# Patient Record
Sex: MALE | Race: BLACK OR AFRICAN AMERICAN | Employment: OTHER | URBAN - METROPOLITAN AREA
[De-identification: names, ages, dates, MRNs, and addresses within clinical notes are randomized per-mention and may not be internally consistent; named-entity substitution may affect disease eponyms.]

---

## 2017-01-01 ENCOUNTER — ALLIED HEALTH/NURSE VISIT (OUTPATIENT)
Dept: PHARMACY | Facility: CLINIC | Age: 67
End: 2017-01-01
Payer: COMMERCIAL

## 2017-01-01 ENCOUNTER — TELEPHONE (OUTPATIENT)
Dept: FAMILY MEDICINE | Facility: CLINIC | Age: 67
End: 2017-01-01

## 2017-01-01 ENCOUNTER — ANTICOAGULATION THERAPY VISIT (OUTPATIENT)
Dept: ANTICOAGULATION | Facility: CLINIC | Age: 67
End: 2017-01-01

## 2017-01-01 ENCOUNTER — APPOINTMENT (OUTPATIENT)
Dept: CARDIOLOGY | Facility: CLINIC | Age: 67
DRG: 315 | End: 2017-01-01
Payer: COMMERCIAL

## 2017-01-01 ENCOUNTER — CARE COORDINATION (OUTPATIENT)
Dept: GERIATRIC MEDICINE | Facility: CLINIC | Age: 67
End: 2017-01-01

## 2017-01-01 ENCOUNTER — OFFICE VISIT (OUTPATIENT)
Dept: FAMILY MEDICINE | Facility: CLINIC | Age: 67
End: 2017-01-01
Payer: COMMERCIAL

## 2017-01-01 ENCOUNTER — CARE COORDINATION (OUTPATIENT)
Dept: CARE COORDINATION | Facility: CLINIC | Age: 67
End: 2017-01-01

## 2017-01-01 ENCOUNTER — HOSPITAL ENCOUNTER (OUTPATIENT)
Facility: CLINIC | Age: 67
Setting detail: SPECIMEN
Discharge: HOME | End: 2017-12-09
Admitting: UROLOGY
Payer: COMMERCIAL

## 2017-01-01 ENCOUNTER — OFFICE VISIT (OUTPATIENT)
Dept: INTERPRETER SERVICES | Facility: CLINIC | Age: 67
End: 2017-01-01

## 2017-01-01 ENCOUNTER — APPOINTMENT (OUTPATIENT)
Dept: CT IMAGING | Facility: CLINIC | Age: 67
DRG: 315 | End: 2017-01-01
Attending: EMERGENCY MEDICINE
Payer: COMMERCIAL

## 2017-01-01 ENCOUNTER — OFFICE VISIT (OUTPATIENT)
Dept: CARDIOLOGY | Facility: CLINIC | Age: 67
End: 2017-01-01
Attending: NURSE PRACTITIONER
Payer: COMMERCIAL

## 2017-01-01 ENCOUNTER — CARE COORDINATION (OUTPATIENT)
Dept: CARDIOLOGY | Facility: CLINIC | Age: 67
End: 2017-01-01

## 2017-01-01 ENCOUNTER — TRANSFERRED RECORDS (OUTPATIENT)
Dept: HEALTH INFORMATION MANAGEMENT | Facility: CLINIC | Age: 67
End: 2017-01-01

## 2017-01-01 ENCOUNTER — HOSPITAL ENCOUNTER (INPATIENT)
Facility: CLINIC | Age: 67
LOS: 4 days | Discharge: HOME | DRG: 315 | End: 2017-11-26
Attending: EMERGENCY MEDICINE | Admitting: INTERNAL MEDICINE
Payer: COMMERCIAL

## 2017-01-01 ENCOUNTER — HOSPITAL ENCOUNTER (OUTPATIENT)
Facility: CLINIC | Age: 67
Setting detail: SPECIMEN
Discharge: HOME | End: 2017-09-15
Payer: COMMERCIAL

## 2017-01-01 ENCOUNTER — DOCUMENTATION ONLY (OUTPATIENT)
Dept: CARE COORDINATION | Facility: CLINIC | Age: 67
End: 2017-01-01

## 2017-01-01 ENCOUNTER — OFFICE VISIT (OUTPATIENT)
Dept: NEPHROLOGY | Facility: CLINIC | Age: 67
End: 2017-01-01
Payer: COMMERCIAL

## 2017-01-01 ENCOUNTER — NURSE TRIAGE (OUTPATIENT)
Dept: NURSING | Facility: CLINIC | Age: 67
End: 2017-01-01

## 2017-01-01 ENCOUNTER — HOSPITAL ENCOUNTER (INPATIENT)
Facility: CLINIC | Age: 67
LOS: 2 days | Discharge: HOME | DRG: 315 | End: 2017-08-29
Attending: EMERGENCY MEDICINE | Admitting: INTERNAL MEDICINE
Payer: COMMERCIAL

## 2017-01-01 ENCOUNTER — TELEPHONE (OUTPATIENT)
Dept: PHARMACY | Facility: OTHER | Age: 67
End: 2017-01-01

## 2017-01-01 ENCOUNTER — RADIANT APPOINTMENT (OUTPATIENT)
Dept: CARDIOLOGY | Facility: CLINIC | Age: 67
End: 2017-01-01
Attending: NURSE PRACTITIONER

## 2017-01-01 ENCOUNTER — HOSPITAL ENCOUNTER (OUTPATIENT)
Facility: CLINIC | Age: 67
Setting detail: SPECIMEN
Discharge: HOME | End: 2017-12-12
Admitting: INTERNAL MEDICINE
Payer: COMMERCIAL

## 2017-01-01 ENCOUNTER — HOSPITAL ENCOUNTER (EMERGENCY)
Facility: CLINIC | Age: 67
Discharge: HOME | End: 2017-07-20
Attending: EMERGENCY MEDICINE | Admitting: EMERGENCY MEDICINE
Payer: COMMERCIAL

## 2017-01-01 ENCOUNTER — CARE COORDINATION (OUTPATIENT)
Dept: FAMILY MEDICINE | Facility: CLINIC | Age: 67
End: 2017-01-01

## 2017-01-01 ENCOUNTER — APPOINTMENT (OUTPATIENT)
Dept: GENERAL RADIOLOGY | Facility: CLINIC | Age: 67
End: 2017-01-01
Attending: EMERGENCY MEDICINE
Payer: COMMERCIAL

## 2017-01-01 ENCOUNTER — TELEPHONE (OUTPATIENT)
Dept: NURSING | Facility: CLINIC | Age: 67
End: 2017-01-01

## 2017-01-01 VITALS
OXYGEN SATURATION: 100 % | BODY MASS INDEX: 27.35 KG/M2 | SYSTOLIC BLOOD PRESSURE: 99 MMHG | HEART RATE: 60 BPM | TEMPERATURE: 98.2 F | RESPIRATION RATE: 18 BRPM | DIASTOLIC BLOOD PRESSURE: 83 MMHG | WEIGHT: 179.9 LBS

## 2017-01-01 VITALS
TEMPERATURE: 97.9 F | WEIGHT: 185.63 LBS | DIASTOLIC BLOOD PRESSURE: 85 MMHG | SYSTOLIC BLOOD PRESSURE: 98 MMHG | HEART RATE: 66 BPM | OXYGEN SATURATION: 96 % | BODY MASS INDEX: 28.22 KG/M2 | RESPIRATION RATE: 16 BRPM

## 2017-01-01 VITALS
OXYGEN SATURATION: 99 % | SYSTOLIC BLOOD PRESSURE: 92 MMHG | DIASTOLIC BLOOD PRESSURE: 64 MMHG | RESPIRATION RATE: 14 BRPM | HEART RATE: 60 BPM

## 2017-01-01 VITALS
WEIGHT: 170 LBS | SYSTOLIC BLOOD PRESSURE: 72 MMHG | HEIGHT: 68 IN | HEART RATE: 64 BPM | TEMPERATURE: 98.1 F | OXYGEN SATURATION: 98 % | BODY MASS INDEX: 25.76 KG/M2

## 2017-01-01 VITALS
OXYGEN SATURATION: 91 % | SYSTOLIC BLOOD PRESSURE: 78 MMHG | TEMPERATURE: 97.7 F | HEART RATE: 60 BPM | BODY MASS INDEX: 27.28 KG/M2 | WEIGHT: 180 LBS | HEIGHT: 68 IN

## 2017-01-01 VITALS
SYSTOLIC BLOOD PRESSURE: 96 MMHG | DIASTOLIC BLOOD PRESSURE: 66 MMHG | OXYGEN SATURATION: 99 % | HEART RATE: 60 BPM | RESPIRATION RATE: 14 BRPM

## 2017-01-01 VITALS
OXYGEN SATURATION: 100 % | BODY MASS INDEX: 27.28 KG/M2 | WEIGHT: 180 LBS | HEIGHT: 68 IN | DIASTOLIC BLOOD PRESSURE: 65 MMHG | RESPIRATION RATE: 16 BRPM | TEMPERATURE: 98.1 F | SYSTOLIC BLOOD PRESSURE: 90 MMHG | HEART RATE: 62 BPM

## 2017-01-01 VITALS
HEART RATE: 60 BPM | SYSTOLIC BLOOD PRESSURE: 92 MMHG | RESPIRATION RATE: 16 BRPM | OXYGEN SATURATION: 100 % | DIASTOLIC BLOOD PRESSURE: 64 MMHG

## 2017-01-01 VITALS
OXYGEN SATURATION: 99 % | HEART RATE: 56 BPM | DIASTOLIC BLOOD PRESSURE: 60 MMHG | SYSTOLIC BLOOD PRESSURE: 92 MMHG | RESPIRATION RATE: 14 BRPM

## 2017-01-01 VITALS
WEIGHT: 194 LBS | SYSTOLIC BLOOD PRESSURE: 106 MMHG | HEART RATE: 58 BPM | BODY MASS INDEX: 29.5 KG/M2 | RESPIRATION RATE: 16 BRPM | OXYGEN SATURATION: 100 % | TEMPERATURE: 97.8 F | DIASTOLIC BLOOD PRESSURE: 82 MMHG

## 2017-01-01 VITALS — WEIGHT: 182 LBS | SYSTOLIC BLOOD PRESSURE: 78 MMHG | HEIGHT: 68 IN | BODY MASS INDEX: 27.58 KG/M2

## 2017-01-01 VITALS
SYSTOLIC BLOOD PRESSURE: 92 MMHG | OXYGEN SATURATION: 99 % | DIASTOLIC BLOOD PRESSURE: 60 MMHG | RESPIRATION RATE: 16 BRPM | HEART RATE: 60 BPM

## 2017-01-01 DIAGNOSIS — Z95.811 LVAD (LEFT VENTRICULAR ASSIST DEVICE) PRESENT (H): ICD-10-CM

## 2017-01-01 DIAGNOSIS — D50.0 IRON DEFICIENCY ANEMIA DUE TO CHRONIC BLOOD LOSS: ICD-10-CM

## 2017-01-01 DIAGNOSIS — Z79.01 LONG-TERM (CURRENT) USE OF ANTICOAGULANTS: ICD-10-CM

## 2017-01-01 DIAGNOSIS — H25.9 AGE-RELATED CATARACT OF RIGHT EYE, UNSPECIFIED AGE-RELATED CATARACT TYPE: ICD-10-CM

## 2017-01-01 DIAGNOSIS — Z95.810 AUTOMATIC IMPLANTABLE CARDIOVERTER-DEFIBRILLATOR IN SITU: ICD-10-CM

## 2017-01-01 DIAGNOSIS — E16.2 HYPOGLYCEMIA: ICD-10-CM

## 2017-01-01 DIAGNOSIS — I50.22 CHF (CONGESTIVE HEART FAILURE), NYHA CLASS IV, CHRONIC, SYSTOLIC (H): ICD-10-CM

## 2017-01-01 DIAGNOSIS — R31.9 HEMATURIA: ICD-10-CM

## 2017-01-01 DIAGNOSIS — I50.22 CHRONIC SYSTOLIC CONGESTIVE HEART FAILURE (H): ICD-10-CM

## 2017-01-01 DIAGNOSIS — I50.20 SYSTOLIC HEART FAILURE, UNSPECIFIED HEART FAILURE CHRONICITY: Primary | ICD-10-CM

## 2017-01-01 DIAGNOSIS — R33.8 BENIGN PROSTATIC HYPERPLASIA WITH URINARY RETENTION: ICD-10-CM

## 2017-01-01 DIAGNOSIS — R35.0 URINARY FREQUENCY: ICD-10-CM

## 2017-01-01 DIAGNOSIS — N40.1 BENIGN PROSTATIC HYPERPLASIA WITH URINARY RETENTION: ICD-10-CM

## 2017-01-01 DIAGNOSIS — G62.9 NEUROPATHY: ICD-10-CM

## 2017-01-01 DIAGNOSIS — L30.4 INTERTRIGINOUS DERMATITIS ASSOCIATED WITH MOISTURE: Primary | ICD-10-CM

## 2017-01-01 DIAGNOSIS — R13.12 OROPHARYNGEAL DYSPHAGIA: ICD-10-CM

## 2017-01-01 DIAGNOSIS — I50.22 CHRONIC SYSTOLIC CONGESTIVE HEART FAILURE (H): Primary | ICD-10-CM

## 2017-01-01 DIAGNOSIS — Z96.1 PSEUDOPHAKIA, RIGHT EYE: ICD-10-CM

## 2017-01-01 DIAGNOSIS — I63.411 CEREBRAL INFARCTION DUE TO EMBOLISM OF RIGHT MIDDLE CEREBRAL ARTERY (H): ICD-10-CM

## 2017-01-01 DIAGNOSIS — H57.89 IRRITATION OF RIGHT EYE: ICD-10-CM

## 2017-01-01 DIAGNOSIS — E78.5 HYPERLIPIDEMIA, UNSPECIFIED HYPERLIPIDEMIA TYPE: ICD-10-CM

## 2017-01-01 DIAGNOSIS — N18.30 STAGE 3 CHRONIC KIDNEY DISEASE (H): ICD-10-CM

## 2017-01-01 DIAGNOSIS — Z95.811 LVAD (LEFT VENTRICULAR ASSIST DEVICE) PRESENT (H): Primary | ICD-10-CM

## 2017-01-01 DIAGNOSIS — R56.9 CONVULSIONS, UNSPECIFIED CONVULSION TYPE (H): ICD-10-CM

## 2017-01-01 DIAGNOSIS — J30.9 ALLERGIC RHINITIS: ICD-10-CM

## 2017-01-01 DIAGNOSIS — R10.84 ABDOMINAL PAIN, GENERALIZED: Primary | ICD-10-CM

## 2017-01-01 DIAGNOSIS — E78.5 HYPERLIPIDEMIA LDL GOAL <100: ICD-10-CM

## 2017-01-01 DIAGNOSIS — K59.01 SLOW TRANSIT CONSTIPATION: ICD-10-CM

## 2017-01-01 DIAGNOSIS — R10.11 RUQ ABDOMINAL PAIN: ICD-10-CM

## 2017-01-01 DIAGNOSIS — K21.00 GASTROESOPHAGEAL REFLUX DISEASE WITH ESOPHAGITIS: ICD-10-CM

## 2017-01-01 DIAGNOSIS — M1A.20X0 CHRONIC GOUT DUE TO DRUG WITHOUT TOPHUS, UNSPECIFIED SITE: ICD-10-CM

## 2017-01-01 DIAGNOSIS — M79.672 LEFT FOOT PAIN: Primary | ICD-10-CM

## 2017-01-01 DIAGNOSIS — R33.9 URINARY RETENTION: ICD-10-CM

## 2017-01-01 DIAGNOSIS — M79.672 LEFT FOOT PAIN: ICD-10-CM

## 2017-01-01 DIAGNOSIS — N18.30 CKD (CHRONIC KIDNEY DISEASE) STAGE 3, GFR 30-59 ML/MIN (H): ICD-10-CM

## 2017-01-01 DIAGNOSIS — R56.9 SEIZURE (H): ICD-10-CM

## 2017-01-01 DIAGNOSIS — R10.84 ABDOMINAL PAIN, GENERALIZED: ICD-10-CM

## 2017-01-01 DIAGNOSIS — I50.22 CHRONIC SYSTOLIC HEART FAILURE (H): ICD-10-CM

## 2017-01-01 DIAGNOSIS — D50.9 IRON DEFICIENCY ANEMIA, UNSPECIFIED IRON DEFICIENCY ANEMIA TYPE: Primary | ICD-10-CM

## 2017-01-01 DIAGNOSIS — T82.9XXA LEFT VENTRICULAR ASSIST DEVICE (LVAD) COMPLICATION, INITIAL ENCOUNTER: ICD-10-CM

## 2017-01-01 DIAGNOSIS — S46.811A TRAPEZIUS STRAIN, RIGHT, INITIAL ENCOUNTER: Primary | ICD-10-CM

## 2017-01-01 DIAGNOSIS — E78.5 HYPERLIPIDEMIA LDL GOAL <70: ICD-10-CM

## 2017-01-01 DIAGNOSIS — R14.0 ABDOMINAL DISTENTION: ICD-10-CM

## 2017-01-01 DIAGNOSIS — R10.11 RUQ PAIN: Primary | ICD-10-CM

## 2017-01-01 DIAGNOSIS — N18.4 STAGE 4 CHRONIC KIDNEY DISEASE (H): Primary | ICD-10-CM

## 2017-01-01 DIAGNOSIS — G47.01 INSOMNIA DUE TO MEDICAL CONDITION: ICD-10-CM

## 2017-01-01 DIAGNOSIS — I25.10 CORONARY ARTERY DISEASE INVOLVING NATIVE CORONARY ARTERY OF NATIVE HEART WITHOUT ANGINA PECTORIS: ICD-10-CM

## 2017-01-01 DIAGNOSIS — N18.4 STAGE 4 CHRONIC KIDNEY DISEASE (H): ICD-10-CM

## 2017-01-01 DIAGNOSIS — Z53.9 DIAGNOSIS NOT YET DEFINED: Primary | ICD-10-CM

## 2017-01-01 DIAGNOSIS — I10 ESSENTIAL HYPERTENSION: ICD-10-CM

## 2017-01-01 DIAGNOSIS — I50.22 CHRONIC SYSTOLIC CONGESTIVE HEART FAILURE, NYHA CLASS 4 (H): ICD-10-CM

## 2017-01-01 DIAGNOSIS — T82.198A: ICD-10-CM

## 2017-01-01 DIAGNOSIS — I50.20 SYSTOLIC HEART FAILURE, UNSPECIFIED HEART FAILURE CHRONICITY: ICD-10-CM

## 2017-01-01 DIAGNOSIS — N39.0 CHRONIC UTI: ICD-10-CM

## 2017-01-01 DIAGNOSIS — N30.01 ACUTE CYSTITIS WITH HEMATURIA: ICD-10-CM

## 2017-01-01 DIAGNOSIS — Z79.01 LONG-TERM (CURRENT) USE OF ANTICOAGULANTS: Primary | ICD-10-CM

## 2017-01-01 DIAGNOSIS — E11.9 DIABETES MELLITUS, TYPE 2 (H): Primary | ICD-10-CM

## 2017-01-01 DIAGNOSIS — K29.31 GASTROINTESTINAL HEMORRHAGE ASSOCIATED WITH CHRONIC SUPERFICIAL GASTRITIS: ICD-10-CM

## 2017-01-01 DIAGNOSIS — R30.0 DYSURIA: ICD-10-CM

## 2017-01-01 DIAGNOSIS — I25.110 CORONARY ARTERY DISEASE INVOLVING NATIVE CORONARY ARTERY OF NATIVE HEART WITH UNSTABLE ANGINA PECTORIS (H): ICD-10-CM

## 2017-01-01 DIAGNOSIS — K59.03 DRUG-INDUCED CONSTIPATION: ICD-10-CM

## 2017-01-01 DIAGNOSIS — N40.0 BENIGN NON-NODULAR PROSTATIC HYPERPLASIA WITHOUT LOWER URINARY TRACT SYMPTOMS: ICD-10-CM

## 2017-01-01 DIAGNOSIS — D62 ACUTE POSTHEMORRHAGIC ANEMIA: ICD-10-CM

## 2017-01-01 DIAGNOSIS — S46.812D TRAPEZIUS STRAIN, LEFT, SUBSEQUENT ENCOUNTER: ICD-10-CM

## 2017-01-01 DIAGNOSIS — K21.9 GASTROESOPHAGEAL REFLUX DISEASE WITHOUT ESOPHAGITIS: Primary | ICD-10-CM

## 2017-01-01 DIAGNOSIS — N18.30 CKD (CHRONIC KIDNEY DISEASE) STAGE 3, GFR 30-59 ML/MIN (H): Primary | ICD-10-CM

## 2017-01-01 DIAGNOSIS — J30.89 CHRONIC NONSEASONAL ALLERGIC RHINITIS DUE TO POLLEN: ICD-10-CM

## 2017-01-01 DIAGNOSIS — I25.5 ISCHEMIC CARDIOMYOPATHY: ICD-10-CM

## 2017-01-01 DIAGNOSIS — I48.21 PERMANENT ATRIAL FIBRILLATION (H): ICD-10-CM

## 2017-01-01 DIAGNOSIS — K31.811 GASTRIC AND DUODENAL ANGIODYSPLASIA WITH HEMORRHAGE: ICD-10-CM

## 2017-01-01 LAB
ALBUMIN SERPL-MCNC: 2.9 G/DL (ref 3.4–5)
ALBUMIN SERPL-MCNC: 3 G/DL (ref 3.4–5)
ALBUMIN SERPL-MCNC: 3.1 G/DL (ref 3.4–5)
ALBUMIN SERPL-MCNC: 3.1 G/DL (ref 3.4–5)
ALBUMIN SERPL-MCNC: 3.2 G/DL (ref 3.4–5)
ALBUMIN SERPL-MCNC: 3.3 G/DL (ref 3.4–5)
ALBUMIN SERPL-MCNC: 3.5 G/DL (ref 3.4–5)
ALBUMIN UR-MCNC: 100 MG/DL
ALBUMIN UR-MCNC: 30 MG/DL
ALBUMIN UR-MCNC: 30 MG/DL
ALBUMIN UR-MCNC: NEGATIVE MG/DL
ALP SERPL-CCNC: 116 U/L (ref 40–150)
ALP SERPL-CCNC: 119 U/L (ref 40–150)
ALP SERPL-CCNC: 120 U/L (ref 40–150)
ALP SERPL-CCNC: 122 U/L (ref 40–150)
ALP SERPL-CCNC: 124 U/L (ref 40–150)
ALP SERPL-CCNC: 126 U/L (ref 40–150)
ALP SERPL-CCNC: 130 U/L (ref 40–150)
ALP SERPL-CCNC: 134 U/L (ref 40–150)
ALP SERPL-CCNC: 135 U/L (ref 40–150)
ALP SERPL-CCNC: 136 U/L (ref 40–150)
ALT SERPL W P-5'-P-CCNC: 16 U/L (ref 0–70)
ALT SERPL W P-5'-P-CCNC: 20 U/L (ref 0–70)
ALT SERPL W P-5'-P-CCNC: 24 U/L (ref 0–70)
ALT SERPL W P-5'-P-CCNC: 26 U/L (ref 0–70)
ALT SERPL W P-5'-P-CCNC: 27 U/L (ref 0–70)
ALT SERPL W P-5'-P-CCNC: 30 U/L (ref 0–70)
ALT SERPL W P-5'-P-CCNC: 38 U/L (ref 0–70)
ALT SERPL W P-5'-P-CCNC: 39 U/L (ref 0–70)
ANION GAP SERPL CALCULATED.3IONS-SCNC: 10 MMOL/L (ref 3–14)
ANION GAP SERPL CALCULATED.3IONS-SCNC: 6 MMOL/L (ref 3–14)
ANION GAP SERPL CALCULATED.3IONS-SCNC: 6 MMOL/L (ref 3–14)
ANION GAP SERPL CALCULATED.3IONS-SCNC: 7 MMOL/L (ref 3–14)
ANION GAP SERPL CALCULATED.3IONS-SCNC: 8 MMOL/L (ref 3–14)
ANION GAP SERPL CALCULATED.3IONS-SCNC: 9 MMOL/L (ref 3–14)
ANION GAP SERPL CALCULATED.3IONS-SCNC: NORMAL MMOL/L (ref 6–17)
APPEARANCE UR: ABNORMAL
APPEARANCE UR: CLEAR
AST SERPL W P-5'-P-CCNC: 36 U/L (ref 0–45)
AST SERPL W P-5'-P-CCNC: 37 U/L (ref 0–45)
AST SERPL W P-5'-P-CCNC: 52 U/L (ref 0–45)
AST SERPL W P-5'-P-CCNC: 57 U/L (ref 0–45)
AST SERPL W P-5'-P-CCNC: 82 U/L (ref 0–45)
AST SERPL W P-5'-P-CCNC: ABNORMAL U/L (ref 0–45)
BACTERIA #/AREA URNS HPF: ABNORMAL /HPF
BACTERIA SPEC CULT: NO GROWTH
BACTERIA SPEC CULT: NORMAL
BACTERIA SPEC CULT: NORMAL
BASOPHILS # BLD AUTO: 0 10E9/L (ref 0–0.2)
BASOPHILS # BLD AUTO: 0 10E9/L (ref 0–0.2)
BASOPHILS # BLD AUTO: 0.1 10E9/L (ref 0–0.2)
BASOPHILS NFR BLD AUTO: 0.3 %
BASOPHILS NFR BLD AUTO: 0.4 %
BASOPHILS NFR BLD AUTO: 0.5 %
BASOPHILS NFR BLD AUTO: 0.7 %
BASOPHILS NFR BLD AUTO: 0.8 %
BILIRUB DIRECT SERPL-MCNC: 0.4 MG/DL (ref 0–0.2)
BILIRUB SERPL-MCNC: 0.5 MG/DL (ref 0.2–1.3)
BILIRUB SERPL-MCNC: 0.6 MG/DL (ref 0.2–1.3)
BILIRUB SERPL-MCNC: 0.6 MG/DL (ref 0.2–1.3)
BILIRUB SERPL-MCNC: 0.8 MG/DL (ref 0.2–1.3)
BILIRUB SERPL-MCNC: 1.1 MG/DL (ref 0.2–1.3)
BILIRUB SERPL-MCNC: 1.2 MG/DL (ref 0.2–1.3)
BILIRUB SERPL-MCNC: 1.2 MG/DL (ref 0.2–1.3)
BILIRUB SERPL-MCNC: 1.3 MG/DL (ref 0.2–1.3)
BILIRUB SERPL-MCNC: 1.5 MG/DL (ref 0.2–1.3)
BILIRUB SERPL-MCNC: 1.6 MG/DL (ref 0.2–1.3)
BILIRUB UR QL STRIP: NEGATIVE
BUN SERPL-MCNC: 16 MG/DL (ref 7–30)
BUN SERPL-MCNC: 16 MG/DL (ref 7–30)
BUN SERPL-MCNC: 17 MG/DL (ref 7–30)
BUN SERPL-MCNC: 18 MG/DL (ref 7–30)
BUN SERPL-MCNC: 18 MG/DL (ref 7–30)
BUN SERPL-MCNC: 19 MG/DL (ref 7–30)
BUN SERPL-MCNC: 20 MG/DL (ref 7–30)
BUN SERPL-MCNC: 22 MG/DL (ref 7–30)
BUN SERPL-MCNC: 25 MG/DL (ref 7–30)
BUN SERPL-MCNC: 26 MG/DL (ref 7–30)
BUN SERPL-MCNC: 27 MG/DL (ref 7–30)
BUN SERPL-MCNC: 27 MG/DL (ref 7–30)
BUN SERPL-MCNC: 28 MG/DL (ref 7–30)
BUN SERPL-MCNC: 29 MG/DL (ref 7–30)
BUN SERPL-MCNC: 31 MG/DL (ref 7–30)
BUN SERPL-MCNC: 31 MG/DL (ref 7–30)
BUN SERPL-MCNC: 32 MG/DL (ref 7–30)
BUN SERPL-MCNC: NORMAL MG/DL (ref 7–30)
CALCIUM SERPL-MCNC: 7.9 MG/DL (ref 8.5–10.1)
CALCIUM SERPL-MCNC: 8.1 MG/DL (ref 8.5–10.1)
CALCIUM SERPL-MCNC: 8.2 MG/DL (ref 8.5–10.1)
CALCIUM SERPL-MCNC: 8.3 MG/DL (ref 8.5–10.1)
CALCIUM SERPL-MCNC: 8.3 MG/DL (ref 8.5–10.1)
CALCIUM SERPL-MCNC: 8.4 MG/DL (ref 8.5–10.1)
CALCIUM SERPL-MCNC: 8.5 MG/DL (ref 8.5–10.1)
CALCIUM SERPL-MCNC: 8.5 MG/DL (ref 8.5–10.1)
CALCIUM SERPL-MCNC: 8.6 MG/DL (ref 8.5–10.1)
CALCIUM SERPL-MCNC: 8.6 MG/DL (ref 8.5–10.1)
CALCIUM SERPL-MCNC: 8.7 MG/DL (ref 8.5–10.1)
CALCIUM SERPL-MCNC: 8.9 MG/DL (ref 8.5–10.1)
CALCIUM SERPL-MCNC: NORMAL MG/DL (ref 8.5–10.1)
CHLORIDE SERPL-SCNC: 107 MMOL/L (ref 94–109)
CHLORIDE SERPL-SCNC: 109 MMOL/L (ref 94–109)
CHLORIDE SERPL-SCNC: 110 MMOL/L (ref 94–109)
CHLORIDE SERPL-SCNC: 111 MMOL/L (ref 94–109)
CHLORIDE SERPL-SCNC: 112 MMOL/L (ref 94–109)
CHLORIDE SERPL-SCNC: 112 MMOL/L (ref 94–109)
CHLORIDE SERPL-SCNC: 114 MMOL/L (ref 94–109)
CHLORIDE SERPL-SCNC: NORMAL MMOL/L (ref 94–109)
CK SERPL-CCNC: NORMAL U/L (ref 30–300)
CK SERPL-CCNC: NORMAL U/L (ref 30–300)
CO2 SERPL-SCNC: 17 MMOL/L (ref 20–32)
CO2 SERPL-SCNC: 19 MMOL/L (ref 20–32)
CO2 SERPL-SCNC: 19 MMOL/L (ref 20–32)
CO2 SERPL-SCNC: 20 MMOL/L (ref 20–32)
CO2 SERPL-SCNC: 21 MMOL/L (ref 20–32)
CO2 SERPL-SCNC: 22 MMOL/L (ref 20–32)
CO2 SERPL-SCNC: 23 MMOL/L (ref 20–32)
CO2 SERPL-SCNC: 23 MMOL/L (ref 20–32)
CO2 SERPL-SCNC: 24 MMOL/L (ref 20–32)
CO2 SERPL-SCNC: NORMAL MMOL/L (ref 20–32)
COLOR UR AUTO: ABNORMAL
COLOR UR AUTO: YELLOW
COLOR UR AUTO: YELLOW
COPATH REPORT: NORMAL
CREAT SERPL-MCNC: 1.78 MG/DL (ref 0.66–1.25)
CREAT SERPL-MCNC: 1.79 MG/DL (ref 0.66–1.25)
CREAT SERPL-MCNC: 1.84 MG/DL (ref 0.66–1.25)
CREAT SERPL-MCNC: 1.85 MG/DL (ref 0.66–1.25)
CREAT SERPL-MCNC: 1.87 MG/DL (ref 0.66–1.25)
CREAT SERPL-MCNC: 2.02 MG/DL (ref 0.66–1.25)
CREAT SERPL-MCNC: 2.1 MG/DL (ref 0.66–1.25)
CREAT SERPL-MCNC: 2.1 MG/DL (ref 0.66–1.25)
CREAT SERPL-MCNC: 2.12 MG/DL (ref 0.66–1.25)
CREAT SERPL-MCNC: 2.18 MG/DL (ref 0.66–1.25)
CREAT SERPL-MCNC: 2.22 MG/DL (ref 0.66–1.25)
CREAT SERPL-MCNC: 2.22 MG/DL (ref 0.66–1.25)
CREAT SERPL-MCNC: 2.24 MG/DL (ref 0.66–1.25)
CREAT SERPL-MCNC: 2.25 MG/DL (ref 0.66–1.25)
CREAT SERPL-MCNC: 2.41 MG/DL (ref 0.66–1.25)
CREAT SERPL-MCNC: 2.43 MG/DL (ref 0.66–1.25)
CREAT SERPL-MCNC: 2.55 MG/DL (ref 0.66–1.25)
CREAT SERPL-MCNC: 2.56 MG/DL (ref 0.66–1.25)
CREAT SERPL-MCNC: 2.65 MG/DL (ref 0.66–1.25)
CREAT SERPL-MCNC: NORMAL MG/DL (ref 0.66–1.25)
CREAT UR-MCNC: 41 MG/DL
CRP SERPL-MCNC: 58.5 MG/L (ref 0–8)
D DIMER PPP FEU-MCNC: 2.1 UG/ML FEU (ref 0–0.5)
DEPRECATED CALCIDIOL+CALCIFEROL SERPL-MC: 18 UG/L (ref 20–75)
DIFFERENTIAL METHOD BLD: ABNORMAL
DIFFERENTIAL METHOD BLD: NORMAL
EOSINOPHIL # BLD AUTO: 0.6 10E9/L (ref 0–0.7)
EOSINOPHIL # BLD AUTO: 0.7 10E9/L (ref 0–0.7)
EOSINOPHIL # BLD AUTO: 0.7 10E9/L (ref 0–0.7)
EOSINOPHIL NFR BLD AUTO: 10 %
EOSINOPHIL NFR BLD AUTO: 6.2 %
EOSINOPHIL NFR BLD AUTO: 6.5 %
EOSINOPHIL NFR BLD AUTO: 8.6 %
EOSINOPHIL NFR BLD AUTO: 9.8 %
ERYTHROCYTE [DISTWIDTH] IN BLOOD BY AUTOMATED COUNT: 18.5 % (ref 10–15)
ERYTHROCYTE [DISTWIDTH] IN BLOOD BY AUTOMATED COUNT: 18.5 % (ref 10–15)
ERYTHROCYTE [DISTWIDTH] IN BLOOD BY AUTOMATED COUNT: 18.6 % (ref 10–15)
ERYTHROCYTE [DISTWIDTH] IN BLOOD BY AUTOMATED COUNT: 18.8 % (ref 10–15)
ERYTHROCYTE [DISTWIDTH] IN BLOOD BY AUTOMATED COUNT: 18.9 % (ref 10–15)
ERYTHROCYTE [DISTWIDTH] IN BLOOD BY AUTOMATED COUNT: 19 % (ref 10–15)
ERYTHROCYTE [DISTWIDTH] IN BLOOD BY AUTOMATED COUNT: 19 % (ref 10–15)
ERYTHROCYTE [DISTWIDTH] IN BLOOD BY AUTOMATED COUNT: 19.1 % (ref 10–15)
ERYTHROCYTE [DISTWIDTH] IN BLOOD BY AUTOMATED COUNT: 19.6 % (ref 10–15)
ERYTHROCYTE [DISTWIDTH] IN BLOOD BY AUTOMATED COUNT: 19.6 % (ref 10–15)
ERYTHROCYTE [DISTWIDTH] IN BLOOD BY AUTOMATED COUNT: 20 % (ref 10–15)
ERYTHROCYTE [DISTWIDTH] IN BLOOD BY AUTOMATED COUNT: 20.1 % (ref 10–15)
ERYTHROCYTE [DISTWIDTH] IN BLOOD BY AUTOMATED COUNT: 20.1 % (ref 10–15)
ERYTHROCYTE [DISTWIDTH] IN BLOOD BY AUTOMATED COUNT: 20.2 % (ref 10–15)
ERYTHROCYTE [DISTWIDTH] IN BLOOD BY AUTOMATED COUNT: 20.4 % (ref 10–15)
ERYTHROCYTE [DISTWIDTH] IN BLOOD BY AUTOMATED COUNT: 21.4 % (ref 10–15)
ERYTHROCYTE [DISTWIDTH] IN BLOOD BY AUTOMATED COUNT: 21.9 % (ref 10–15)
ERYTHROCYTE [DISTWIDTH] IN BLOOD BY AUTOMATED COUNT: NORMAL % (ref 10–15)
ERYTHROCYTE [DISTWIDTH] IN BLOOD BY AUTOMATED COUNT: NORMAL % (ref 10–15)
FERRITIN SERPL-MCNC: 70 NG/ML (ref 26–388)
GFR SERPL CREATININE-BSD FRML MDRD: 24 ML/MIN/1.7M2
GFR SERPL CREATININE-BSD FRML MDRD: 25 ML/MIN/1.7M2
GFR SERPL CREATININE-BSD FRML MDRD: 25 ML/MIN/1.7M2
GFR SERPL CREATININE-BSD FRML MDRD: 27 ML/MIN/1.7M2
GFR SERPL CREATININE-BSD FRML MDRD: 27 ML/MIN/1.7M2
GFR SERPL CREATININE-BSD FRML MDRD: 29 ML/MIN/1.7M2
GFR SERPL CREATININE-BSD FRML MDRD: 29 ML/MIN/1.7M2
GFR SERPL CREATININE-BSD FRML MDRD: 30 ML/MIN/1.7M2
GFR SERPL CREATININE-BSD FRML MDRD: 31 ML/MIN/1.7M2
GFR SERPL CREATININE-BSD FRML MDRD: 32 ML/MIN/1.7M2
GFR SERPL CREATININE-BSD FRML MDRD: 32 ML/MIN/1.7M2
GFR SERPL CREATININE-BSD FRML MDRD: 33 ML/MIN/1.7M2
GFR SERPL CREATININE-BSD FRML MDRD: 36 ML/MIN/1.7M2
GFR SERPL CREATININE-BSD FRML MDRD: 37 ML/MIN/1.7M2
GFR SERPL CREATININE-BSD FRML MDRD: 37 ML/MIN/1.7M2
GFR SERPL CREATININE-BSD FRML MDRD: 38 ML/MIN/1.7M2
GFR SERPL CREATININE-BSD FRML MDRD: 38 ML/MIN/1.7M2
GFR SERPL CREATININE-BSD FRML MDRD: NORMAL ML/MIN/1.7M2
GLUCOSE BLDC GLUCOMTR-MCNC: 143 MG/DL (ref 70–99)
GLUCOSE BLDC GLUCOMTR-MCNC: 149 MG/DL (ref 70–99)
GLUCOSE SERPL-MCNC: 102 MG/DL (ref 70–99)
GLUCOSE SERPL-MCNC: 105 MG/DL (ref 70–99)
GLUCOSE SERPL-MCNC: 109 MG/DL (ref 70–99)
GLUCOSE SERPL-MCNC: 111 MG/DL (ref 70–99)
GLUCOSE SERPL-MCNC: 112 MG/DL (ref 70–99)
GLUCOSE SERPL-MCNC: 114 MG/DL (ref 70–99)
GLUCOSE SERPL-MCNC: 118 MG/DL (ref 70–99)
GLUCOSE SERPL-MCNC: 118 MG/DL (ref 70–99)
GLUCOSE SERPL-MCNC: 120 MG/DL (ref 70–99)
GLUCOSE SERPL-MCNC: 121 MG/DL (ref 70–99)
GLUCOSE SERPL-MCNC: 123 MG/DL (ref 70–99)
GLUCOSE SERPL-MCNC: 127 MG/DL (ref 70–99)
GLUCOSE SERPL-MCNC: 131 MG/DL (ref 70–99)
GLUCOSE SERPL-MCNC: 133 MG/DL (ref 70–99)
GLUCOSE SERPL-MCNC: 135 MG/DL (ref 70–99)
GLUCOSE SERPL-MCNC: 143 MG/DL (ref 70–99)
GLUCOSE SERPL-MCNC: 144 MG/DL (ref 70–99)
GLUCOSE SERPL-MCNC: 144 MG/DL (ref 70–99)
GLUCOSE SERPL-MCNC: 148 MG/DL (ref 70–99)
GLUCOSE SERPL-MCNC: NORMAL MG/DL (ref 70–99)
GLUCOSE UR STRIP-MCNC: NEGATIVE MG/DL
HAPTOGLOB SERPL-MCNC: <6 MG/DL (ref 35–175)
HAPTOGLOB SERPL-MCNC: <6 MG/DL (ref 35–175)
HAPTOGLOB SERPL-MCNC: NORMAL MG/DL (ref 35–175)
HBA1C MFR BLD: 4.8 % (ref 4.3–6)
HCT VFR BLD AUTO: 26.5 % (ref 40–53)
HCT VFR BLD AUTO: 26.6 % (ref 40–53)
HCT VFR BLD AUTO: 29.8 % (ref 40–53)
HCT VFR BLD AUTO: 29.8 % (ref 40–53)
HCT VFR BLD AUTO: 30.2 % (ref 40–53)
HCT VFR BLD AUTO: 30.3 % (ref 40–53)
HCT VFR BLD AUTO: 30.5 % (ref 40–53)
HCT VFR BLD AUTO: 30.8 % (ref 40–53)
HCT VFR BLD AUTO: 31.2 % (ref 40–53)
HCT VFR BLD AUTO: 31.9 % (ref 40–53)
HCT VFR BLD AUTO: 31.9 % (ref 40–53)
HCT VFR BLD AUTO: 32 % (ref 40–53)
HCT VFR BLD AUTO: 32.2 % (ref 40–53)
HCT VFR BLD AUTO: 32.3 % (ref 40–53)
HCT VFR BLD AUTO: 32.4 % (ref 40–53)
HCT VFR BLD AUTO: 33.5 % (ref 40–53)
HCT VFR BLD AUTO: 34.4 % (ref 40–53)
HCT VFR BLD AUTO: 35 % (ref 40–53)
HCT VFR BLD AUTO: 38.8 % (ref 40–53)
HCT VFR BLD AUTO: NORMAL % (ref 40–53)
HCT VFR BLD AUTO: NORMAL % (ref 40–53)
HGB BLD-MCNC: 10 G/DL (ref 13.3–17.7)
HGB BLD-MCNC: 10.1 G/DL (ref 13.3–17.7)
HGB BLD-MCNC: 10.5 G/DL (ref 13.3–17.7)
HGB BLD-MCNC: 10.7 G/DL (ref 13.3–17.7)
HGB BLD-MCNC: 10.8 G/DL (ref 13.3–17.7)
HGB BLD-MCNC: 11.9 G/DL (ref 13.3–17.7)
HGB BLD-MCNC: 8.4 G/DL (ref 13.3–17.7)
HGB BLD-MCNC: 8.5 G/DL (ref 13.3–17.7)
HGB BLD-MCNC: 9.1 G/DL (ref 13.3–17.7)
HGB BLD-MCNC: 9.1 G/DL (ref 13.3–17.7)
HGB BLD-MCNC: 9.3 G/DL (ref 13.3–17.7)
HGB BLD-MCNC: 9.4 G/DL (ref 13.3–17.7)
HGB BLD-MCNC: 9.5 G/DL (ref 13.3–17.7)
HGB BLD-MCNC: 9.7 G/DL (ref 13.3–17.7)
HGB BLD-MCNC: 9.8 G/DL (ref 13.3–17.7)
HGB BLD-MCNC: 9.9 G/DL (ref 13.3–17.7)
HGB BLD-MCNC: 9.9 G/DL (ref 13.3–17.7)
HGB BLD-MCNC: NORMAL G/DL (ref 13.3–17.7)
HGB BLD-MCNC: NORMAL G/DL (ref 13.3–17.7)
HGB FREE PLAS-MCNC: 230 MG/DL
HGB FREE PLAS-MCNC: 290 MG/DL
HGB FREE PLAS-MCNC: 370 MG/DL
HGB UR QL STRIP: ABNORMAL
IMM GRANULOCYTES # BLD: 0 10E9/L (ref 0–0.4)
IMM GRANULOCYTES # BLD: 0 10E9/L (ref 0–0.4)
IMM GRANULOCYTES # BLD: 0.1 10E9/L (ref 0–0.4)
IMM GRANULOCYTES # BLD: 0.1 10E9/L (ref 0–0.4)
IMM GRANULOCYTES NFR BLD: 0.4 %
IMM GRANULOCYTES NFR BLD: 0.6 %
IMM GRANULOCYTES NFR BLD: 0.8 %
IMM GRANULOCYTES NFR BLD: 0.9 %
INR POINT OF CARE: 2 (ref 0.86–1.14)
INR PPP: 1.52 (ref 0.86–1.14)
INR PPP: 1.6
INR PPP: 1.7
INR PPP: 1.7
INR PPP: 1.74 (ref 0.86–1.14)
INR PPP: 1.8
INR PPP: 1.8
INR PPP: 1.9
INR PPP: 1.9
INR PPP: 1.91 (ref 0.86–1.14)
INR PPP: 2.03 (ref 0.86–1.14)
INR PPP: 2.09 (ref 0.86–1.14)
INR PPP: 2.1
INR PPP: 2.2
INR PPP: 2.3
INR PPP: 2.32 (ref 0.86–1.14)
INR PPP: 2.4
INR PPP: 2.4
INR PPP: 2.45 (ref 0.86–1.14)
INR PPP: 2.5
INR PPP: 2.54 (ref 0.86–1.14)
INR PPP: 2.58 (ref 0.86–1.14)
INR PPP: 2.6
INR PPP: 2.67 (ref 0.86–1.14)
INR PPP: 2.7
INR PPP: 3.14 (ref 0.86–1.14)
INR PPP: NORMAL (ref 0.86–1.14)
INTERPRETATION ECG - MUSE: NORMAL
INTERPRETATION ECG - MUSE: NORMAL
IRON SATN MFR SERPL: 13 % (ref 15–46)
IRON SERPL-MCNC: 33 UG/DL (ref 35–180)
KETONES UR STRIP-MCNC: NEGATIVE MG/DL
LACTATE BLD-SCNC: 0.9 MMOL/L (ref 0.7–2)
LACTATE BLD-SCNC: 0.9 MMOL/L (ref 0.7–2.1)
LDH SERPL L TO P-CCNC: 1412 U/L (ref 85–227)
LDH SERPL L TO P-CCNC: 807 U/L (ref 85–227)
LDH SERPL L TO P-CCNC: 826 U/L (ref 85–227)
LDH SERPL L TO P-CCNC: 854 U/L (ref 85–227)
LDH SERPL L TO P-CCNC: NORMAL U/L (ref 85–227)
LEUKOCYTE ESTERASE UR QL STRIP: ABNORMAL
LEUKOCYTE ESTERASE UR QL STRIP: NEGATIVE
LIPASE SERPL-CCNC: 227 U/L (ref 73–393)
LMWH PPP CHRO-ACNC: 0.28 IU/ML
LMWH PPP CHRO-ACNC: 0.34 IU/ML
LMWH PPP CHRO-ACNC: 0.43 IU/ML
LMWH PPP CHRO-ACNC: 0.45 IU/ML
LMWH PPP CHRO-ACNC: 0.62 IU/ML
LMWH PPP CHRO-ACNC: <0.1 IU/ML
LYMPHOCYTES # BLD AUTO: 0.9 10E9/L (ref 0.8–5.3)
LYMPHOCYTES # BLD AUTO: 1.2 10E9/L (ref 0.8–5.3)
LYMPHOCYTES # BLD AUTO: 1.3 10E9/L (ref 0.8–5.3)
LYMPHOCYTES # BLD AUTO: 1.4 10E9/L (ref 0.8–5.3)
LYMPHOCYTES # BLD AUTO: 1.5 10E9/L (ref 0.8–5.3)
LYMPHOCYTES NFR BLD AUTO: 11.5 %
LYMPHOCYTES NFR BLD AUTO: 12.7 %
LYMPHOCYTES NFR BLD AUTO: 14.3 %
LYMPHOCYTES NFR BLD AUTO: 19.1 %
LYMPHOCYTES NFR BLD AUTO: 20.1 %
Lab: NORMAL
MAGNESIUM SERPL-MCNC: 2.1 MG/DL (ref 1.6–2.3)
MAGNESIUM SERPL-MCNC: 2.1 MG/DL (ref 1.6–2.3)
MAGNESIUM SERPL-MCNC: 2.2 MG/DL (ref 1.6–2.3)
MAGNESIUM SERPL-MCNC: 2.2 MG/DL (ref 1.6–2.3)
MCH RBC QN AUTO: 28.3 PG (ref 26.5–33)
MCH RBC QN AUTO: 28.3 PG (ref 26.5–33)
MCH RBC QN AUTO: 28.4 PG (ref 26.5–33)
MCH RBC QN AUTO: 28.6 PG (ref 26.5–33)
MCH RBC QN AUTO: 28.7 PG (ref 26.5–33)
MCH RBC QN AUTO: 28.8 PG (ref 26.5–33)
MCH RBC QN AUTO: 28.8 PG (ref 26.5–33)
MCH RBC QN AUTO: 28.9 PG (ref 26.5–33)
MCH RBC QN AUTO: 29 PG (ref 26.5–33)
MCH RBC QN AUTO: 29.1 PG (ref 26.5–33)
MCH RBC QN AUTO: 29.2 PG (ref 26.5–33)
MCH RBC QN AUTO: 29.5 PG (ref 26.5–33)
MCH RBC QN AUTO: 29.6 PG (ref 26.5–33)
MCH RBC QN AUTO: 29.6 PG (ref 26.5–33)
MCH RBC QN AUTO: 30.1 PG (ref 26.5–33)
MCH RBC QN AUTO: 30.3 PG (ref 26.5–33)
MCH RBC QN AUTO: NORMAL PG (ref 26.5–33)
MCH RBC QN AUTO: NORMAL PG (ref 26.5–33)
MCHC RBC AUTO-ENTMCNC: 30.2 G/DL (ref 31.5–36.5)
MCHC RBC AUTO-ENTMCNC: 30.4 G/DL (ref 31.5–36.5)
MCHC RBC AUTO-ENTMCNC: 30.5 G/DL (ref 31.5–36.5)
MCHC RBC AUTO-ENTMCNC: 30.7 G/DL (ref 31.5–36.5)
MCHC RBC AUTO-ENTMCNC: 30.7 G/DL (ref 31.5–36.5)
MCHC RBC AUTO-ENTMCNC: 30.9 G/DL (ref 31.5–36.5)
MCHC RBC AUTO-ENTMCNC: 30.9 G/DL (ref 31.5–36.5)
MCHC RBC AUTO-ENTMCNC: 31 G/DL (ref 31.5–36.5)
MCHC RBC AUTO-ENTMCNC: 31.1 G/DL (ref 31.5–36.5)
MCHC RBC AUTO-ENTMCNC: 31.3 G/DL (ref 31.5–36.5)
MCHC RBC AUTO-ENTMCNC: 31.4 G/DL (ref 31.5–36.5)
MCHC RBC AUTO-ENTMCNC: 31.7 G/DL (ref 31.5–36.5)
MCHC RBC AUTO-ENTMCNC: 31.8 G/DL (ref 31.5–36.5)
MCHC RBC AUTO-ENTMCNC: 32 G/DL (ref 31.5–36.5)
MCHC RBC AUTO-ENTMCNC: 32.6 G/DL (ref 31.5–36.5)
MCHC RBC AUTO-ENTMCNC: NORMAL G/DL (ref 31.5–36.5)
MCHC RBC AUTO-ENTMCNC: NORMAL G/DL (ref 31.5–36.5)
MCV RBC AUTO: 90 FL (ref 78–100)
MCV RBC AUTO: 91 FL (ref 78–100)
MCV RBC AUTO: 91 FL (ref 78–100)
MCV RBC AUTO: 92 FL (ref 78–100)
MCV RBC AUTO: 93 FL (ref 78–100)
MCV RBC AUTO: 94 FL (ref 78–100)
MCV RBC AUTO: 94 FL (ref 78–100)
MCV RBC AUTO: 95 FL (ref 78–100)
MCV RBC AUTO: 95 FL (ref 78–100)
MCV RBC AUTO: 96 FL (ref 78–100)
MCV RBC AUTO: NORMAL FL (ref 78–100)
MCV RBC AUTO: NORMAL FL (ref 78–100)
MONOCYTES # BLD AUTO: 0.6 10E9/L (ref 0–1.3)
MONOCYTES # BLD AUTO: 0.8 10E9/L (ref 0–1.3)
MONOCYTES # BLD AUTO: 0.9 10E9/L (ref 0–1.3)
MONOCYTES # BLD AUTO: 1 10E9/L (ref 0–1.3)
MONOCYTES # BLD AUTO: 1.1 10E9/L (ref 0–1.3)
MONOCYTES NFR BLD AUTO: 10.1 %
MONOCYTES NFR BLD AUTO: 10.4 %
MONOCYTES NFR BLD AUTO: 11.4 %
MONOCYTES NFR BLD AUTO: 14.8 %
MONOCYTES NFR BLD AUTO: 8.7 %
MRSA DNA SPEC QL NAA+PROBE: NEGATIVE
MUCOUS THREADS #/AREA URNS LPF: PRESENT /LPF
MUCOUS THREADS #/AREA URNS LPF: PRESENT /LPF
NEUTROPHILS # BLD AUTO: 4.2 10E9/L (ref 1.6–8.3)
NEUTROPHILS # BLD AUTO: 4.3 10E9/L (ref 1.6–8.3)
NEUTROPHILS # BLD AUTO: 4.7 10E9/L (ref 1.6–8.3)
NEUTROPHILS # BLD AUTO: 6.3 10E9/L (ref 1.6–8.3)
NEUTROPHILS # BLD AUTO: 6.5 10E9/L (ref 1.6–8.3)
NEUTROPHILS NFR BLD AUTO: 58.8 %
NEUTROPHILS NFR BLD AUTO: 61 %
NEUTROPHILS NFR BLD AUTO: 63.3 %
NEUTROPHILS NFR BLD AUTO: 67.7 %
NEUTROPHILS NFR BLD AUTO: 69.6 %
NITRATE UR QL: NEGATIVE
NRBC # BLD AUTO: 0 10*3/UL
NRBC BLD AUTO-RTO: 0 /100
NT-PROBNP SERPL-MCNC: 811 PG/ML (ref 0–900)
PH UR STRIP: 5.5 PH (ref 5–7)
PH UR STRIP: 5.5 PH (ref 5–7)
PH UR STRIP: 6 PH (ref 5–7)
PH UR STRIP: 6 PH (ref 5–7)
PH UR STRIP: 7 PH (ref 5–7)
PH UR STRIP: 7 PH (ref 5–7)
PHOSPHATE SERPL-MCNC: 2.7 MG/DL (ref 2.5–4.5)
PHOSPHATE SERPL-MCNC: 3.3 MG/DL (ref 2.5–4.5)
PHOSPHATE SERPL-MCNC: 3.6 MG/DL (ref 2.5–4.5)
PHOSPHATE SERPL-MCNC: 3.7 MG/DL (ref 2.5–4.5)
PLATELET # BLD AUTO: 160 10E9/L (ref 150–450)
PLATELET # BLD AUTO: 166 10E9/L (ref 150–450)
PLATELET # BLD AUTO: 169 10E9/L (ref 150–450)
PLATELET # BLD AUTO: 173 10E9/L (ref 150–450)
PLATELET # BLD AUTO: 175 10E9/L (ref 150–450)
PLATELET # BLD AUTO: 179 10E9/L (ref 150–450)
PLATELET # BLD AUTO: 183 10E9/L (ref 150–450)
PLATELET # BLD AUTO: 190 10E9/L (ref 150–450)
PLATELET # BLD AUTO: 190 10E9/L (ref 150–450)
PLATELET # BLD AUTO: 191 10E9/L (ref 150–450)
PLATELET # BLD AUTO: 200 10E9/L (ref 150–450)
PLATELET # BLD AUTO: 206 10E9/L (ref 150–450)
PLATELET # BLD AUTO: 207 10E9/L (ref 150–450)
PLATELET # BLD AUTO: 210 10E9/L (ref 150–450)
PLATELET # BLD AUTO: 233 10E9/L (ref 150–450)
PLATELET # BLD AUTO: 236 10E9/L (ref 150–450)
PLATELET # BLD AUTO: 242 10E9/L (ref 150–450)
PLATELET # BLD AUTO: 244 10E9/L (ref 150–450)
PLATELET # BLD AUTO: 283 10E9/L (ref 150–450)
PLATELET # BLD AUTO: NORMAL 10E9/L (ref 150–450)
PLATELET # BLD AUTO: NORMAL 10E9/L (ref 150–450)
POTASSIUM SERPL-SCNC: 3.6 MMOL/L (ref 3.4–5.3)
POTASSIUM SERPL-SCNC: 3.7 MMOL/L (ref 3.4–5.3)
POTASSIUM SERPL-SCNC: 3.8 MMOL/L (ref 3.4–5.3)
POTASSIUM SERPL-SCNC: 3.9 MMOL/L (ref 3.4–5.3)
POTASSIUM SERPL-SCNC: 3.9 MMOL/L (ref 3.4–5.3)
POTASSIUM SERPL-SCNC: 4 MMOL/L (ref 3.4–5.3)
POTASSIUM SERPL-SCNC: 4.1 MMOL/L (ref 3.4–5.3)
POTASSIUM SERPL-SCNC: 4.2 MMOL/L (ref 3.4–5.3)
POTASSIUM SERPL-SCNC: 4.3 MMOL/L (ref 3.4–5.3)
POTASSIUM SERPL-SCNC: 4.3 MMOL/L (ref 3.4–5.3)
POTASSIUM SERPL-SCNC: 4.4 MMOL/L (ref 3.4–5.3)
POTASSIUM SERPL-SCNC: 4.4 MMOL/L (ref 3.4–5.3)
POTASSIUM SERPL-SCNC: 4.5 MMOL/L (ref 3.4–5.3)
POTASSIUM SERPL-SCNC: NORMAL MMOL/L (ref 3.4–5.3)
PROT SERPL-MCNC: 6.9 G/DL (ref 6.8–8.8)
PROT SERPL-MCNC: 7.1 G/DL (ref 6.8–8.8)
PROT SERPL-MCNC: 7.2 G/DL (ref 6.8–8.8)
PROT SERPL-MCNC: 7.2 G/DL (ref 6.8–8.8)
PROT SERPL-MCNC: 7.4 G/DL (ref 6.8–8.8)
PROT SERPL-MCNC: 7.5 G/DL (ref 6.8–8.8)
PROT SERPL-MCNC: 7.6 G/DL (ref 6.8–8.8)
PROT SERPL-MCNC: 7.6 G/DL (ref 6.8–8.8)
PROT SERPL-MCNC: 7.8 G/DL (ref 6.8–8.8)
PROT SERPL-MCNC: 7.8 G/DL (ref 6.8–8.8)
PROT UR-MCNC: 0.61 G/L
PROT/CREAT 24H UR: 1.51 G/G CR (ref 0–0.2)
PTH-INTACT SERPL-MCNC: 136 PG/ML (ref 12–72)
RBC # BLD AUTO: 2.77 10E12/L (ref 4.4–5.9)
RBC # BLD AUTO: 2.82 10E12/L (ref 4.4–5.9)
RBC # BLD AUTO: 3.12 10E12/L (ref 4.4–5.9)
RBC # BLD AUTO: 3.2 10E12/L (ref 4.4–5.9)
RBC # BLD AUTO: 3.28 10E12/L (ref 4.4–5.9)
RBC # BLD AUTO: 3.29 10E12/L (ref 4.4–5.9)
RBC # BLD AUTO: 3.29 10E12/L (ref 4.4–5.9)
RBC # BLD AUTO: 3.31 10E12/L (ref 4.4–5.9)
RBC # BLD AUTO: 3.39 10E12/L (ref 4.4–5.9)
RBC # BLD AUTO: 3.4 10E12/L (ref 4.4–5.9)
RBC # BLD AUTO: 3.43 10E12/L (ref 4.4–5.9)
RBC # BLD AUTO: 3.45 10E12/L (ref 4.4–5.9)
RBC # BLD AUTO: 3.47 10E12/L (ref 4.4–5.9)
RBC # BLD AUTO: 3.48 10E12/L (ref 4.4–5.9)
RBC # BLD AUTO: 3.5 10E12/L (ref 4.4–5.9)
RBC # BLD AUTO: 3.56 10E12/L (ref 4.4–5.9)
RBC # BLD AUTO: 3.7 10E12/L (ref 4.4–5.9)
RBC # BLD AUTO: 3.76 10E12/L (ref 4.4–5.9)
RBC # BLD AUTO: 4.2 10E12/L (ref 4.4–5.9)
RBC # BLD AUTO: NORMAL 10E12/L (ref 4.4–5.9)
RBC # BLD AUTO: NORMAL 10E12/L (ref 4.4–5.9)
RBC #/AREA URNS AUTO: 0 /HPF (ref 0–2)
RBC #/AREA URNS AUTO: 2 /HPF (ref 0–2)
RBC #/AREA URNS AUTO: 4 /HPF (ref 0–2)
RBC #/AREA URNS AUTO: <1 /HPF (ref 0–2)
RETICS # AUTO: 167.3 10E9/L (ref 25–95)
RETICS/RBC NFR AUTO: 4.8 % (ref 0.5–2)
SODIUM SERPL-SCNC: 137 MMOL/L (ref 133–144)
SODIUM SERPL-SCNC: 137 MMOL/L (ref 133–144)
SODIUM SERPL-SCNC: 138 MMOL/L (ref 133–144)
SODIUM SERPL-SCNC: 138 MMOL/L (ref 133–144)
SODIUM SERPL-SCNC: 139 MMOL/L (ref 133–144)
SODIUM SERPL-SCNC: 140 MMOL/L (ref 133–144)
SODIUM SERPL-SCNC: 141 MMOL/L (ref 133–144)
SODIUM SERPL-SCNC: 142 MMOL/L (ref 133–144)
SODIUM SERPL-SCNC: 142 MMOL/L (ref 133–144)
SODIUM SERPL-SCNC: NORMAL MMOL/L (ref 133–144)
SOURCE: ABNORMAL
SP GR UR STRIP: 1 (ref 1–1.03)
SP GR UR STRIP: 1 (ref 1–1.03)
SP GR UR STRIP: 1.01 (ref 1–1.03)
SPECIMEN SOURCE: NORMAL
SQUAMOUS #/AREA URNS AUTO: <1 /HPF (ref 0–1)
SQUAMOUS #/AREA URNS AUTO: <1 /HPF (ref 0–1)
TIBC SERPL-MCNC: 248 UG/DL (ref 240–430)
TRANS CELLS #/AREA URNS HPF: 2 /HPF (ref 0–1)
TRANS CELLS #/AREA URNS HPF: <1 /HPF (ref 0–1)
TROPONIN I SERPL-MCNC: <0.015 UG/L (ref 0–0.04)
URN SPEC COLLECT METH UR: ABNORMAL
URN SPEC COLLECT METH UR: ABNORMAL
UROBILINOGEN UR STRIP-MCNC: 0 MG/DL (ref 0–2)
UROBILINOGEN UR STRIP-MCNC: 2 MG/DL (ref 0–2)
UROBILINOGEN UR STRIP-MCNC: NORMAL MG/DL (ref 0–2)
WBC # BLD AUTO: 4.8 10E9/L (ref 4–11)
WBC # BLD AUTO: 5.4 10E9/L (ref 4–11)
WBC # BLD AUTO: 6.2 10E9/L (ref 4–11)
WBC # BLD AUTO: 6.7 10E9/L (ref 4–11)
WBC # BLD AUTO: 6.7 10E9/L (ref 4–11)
WBC # BLD AUTO: 7 10E9/L (ref 4–11)
WBC # BLD AUTO: 7.1 10E9/L (ref 4–11)
WBC # BLD AUTO: 7.1 10E9/L (ref 4–11)
WBC # BLD AUTO: 7.2 10E9/L (ref 4–11)
WBC # BLD AUTO: 7.2 10E9/L (ref 4–11)
WBC # BLD AUTO: 7.4 10E9/L (ref 4–11)
WBC # BLD AUTO: 7.8 10E9/L (ref 4–11)
WBC # BLD AUTO: 8.1 10E9/L (ref 4–11)
WBC # BLD AUTO: 8.5 10E9/L (ref 4–11)
WBC # BLD AUTO: 9.3 10E9/L (ref 4–11)
WBC # BLD AUTO: 9.3 10E9/L (ref 4–11)
WBC # BLD AUTO: 9.8 10E9/L (ref 4–11)
WBC # BLD AUTO: NORMAL 10E9/L (ref 4–11)
WBC # BLD AUTO: NORMAL 10E9/L (ref 4–11)
WBC #/AREA URNS AUTO: 0 /HPF (ref 0–2)
WBC #/AREA URNS AUTO: 1 /HPF (ref 0–2)
WBC #/AREA URNS AUTO: 1 /HPF (ref 0–2)
WBC #/AREA URNS AUTO: 2 /HPF (ref 0–2)
WBC #/AREA URNS AUTO: 8 /HPF (ref 0–2)
WBC #/AREA URNS AUTO: <1 /HPF (ref 0–2)

## 2017-01-01 PROCEDURE — 99285 EMERGENCY DEPT VISIT HI MDM: CPT | Mod: 25 | Performed by: EMERGENCY MEDICINE

## 2017-01-01 PROCEDURE — 85610 PROTHROMBIN TIME: CPT | Performed by: EMERGENCY MEDICINE

## 2017-01-01 PROCEDURE — 99350 HOME/RES VST EST HIGH MDM 60: CPT

## 2017-01-01 PROCEDURE — 25000125 ZZHC RX 250: Performed by: EMERGENCY MEDICINE

## 2017-01-01 PROCEDURE — 83615 LACTATE (LD) (LDH) ENZYME: CPT | Performed by: EMERGENCY MEDICINE

## 2017-01-01 PROCEDURE — S0138 FINASTERIDE, 5 MG: HCPCS | Performed by: STUDENT IN AN ORGANIZED HEALTH CARE EDUCATION/TRAINING PROGRAM

## 2017-01-01 PROCEDURE — 40000141 ZZH STATISTIC PERIPHERAL IV START W/O US GUIDANCE

## 2017-01-01 PROCEDURE — 85027 COMPLETE CBC AUTOMATED: CPT | Performed by: STUDENT IN AN ORGANIZED HEALTH CARE EDUCATION/TRAINING PROGRAM

## 2017-01-01 PROCEDURE — 25000132 ZZH RX MED GY IP 250 OP 250 PS 637: Performed by: INTERNAL MEDICINE

## 2017-01-01 PROCEDURE — 83735 ASSAY OF MAGNESIUM: CPT | Performed by: STUDENT IN AN ORGANIZED HEALTH CARE EDUCATION/TRAINING PROGRAM

## 2017-01-01 PROCEDURE — 80048 BASIC METABOLIC PNL TOTAL CA: CPT | Performed by: INTERNAL MEDICINE

## 2017-01-01 PROCEDURE — 99239 HOSP IP/OBS DSCHRG MGMT >30: CPT | Mod: GC | Performed by: INTERNAL MEDICINE

## 2017-01-01 PROCEDURE — 85610 PROTHROMBIN TIME: CPT | Performed by: INTERNAL MEDICINE

## 2017-01-01 PROCEDURE — 82310 ASSAY OF CALCIUM: CPT

## 2017-01-01 PROCEDURE — 93750 INTERROGATION VAD IN PERSON: CPT | Performed by: NURSE PRACTITIONER

## 2017-01-01 PROCEDURE — 93750 INTERROGATION VAD IN PERSON: CPT | Mod: ZF | Performed by: NURSE PRACTITIONER

## 2017-01-01 PROCEDURE — 36415 COLL VENOUS BLD VENIPUNCTURE: CPT | Performed by: STUDENT IN AN ORGANIZED HEALTH CARE EDUCATION/TRAINING PROGRAM

## 2017-01-01 PROCEDURE — 85027 COMPLETE CBC AUTOMATED: CPT | Performed by: INTERNAL MEDICINE

## 2017-01-01 PROCEDURE — 00000146 ZZHCL STATISTIC GLUCOSE BY METER IP

## 2017-01-01 PROCEDURE — 99232 SBSQ HOSP IP/OBS MODERATE 35: CPT | Mod: GC | Performed by: INTERNAL MEDICINE

## 2017-01-01 PROCEDURE — 96360 HYDRATION IV INFUSION INIT: CPT | Performed by: EMERGENCY MEDICINE

## 2017-01-01 PROCEDURE — 83010 ASSAY OF HAPTOGLOBIN QUANT: CPT | Performed by: STUDENT IN AN ORGANIZED HEALTH CARE EDUCATION/TRAINING PROGRAM

## 2017-01-01 PROCEDURE — 82374 ASSAY BLOOD CARBON DIOXIDE: CPT

## 2017-01-01 PROCEDURE — 25000128 H RX IP 250 OP 636: Performed by: INTERNAL MEDICINE

## 2017-01-01 PROCEDURE — 83690 ASSAY OF LIPASE: CPT | Performed by: EMERGENCY MEDICINE

## 2017-01-01 PROCEDURE — 99213 OFFICE O/P EST LOW 20 MIN: CPT | Mod: ZF

## 2017-01-01 PROCEDURE — 36415 COLL VENOUS BLD VENIPUNCTURE: CPT | Performed by: INTERNAL MEDICINE

## 2017-01-01 PROCEDURE — 81001 URINALYSIS AUTO W/SCOPE: CPT | Performed by: INTERNAL MEDICINE

## 2017-01-01 PROCEDURE — 40000611 ZZHCL STATISTIC MORPHOLOGY W/INTERP HEMEPATH TC 85060: Performed by: INTERNAL MEDICINE

## 2017-01-01 PROCEDURE — 83010 ASSAY OF HAPTOGLOBIN QUANT: CPT | Performed by: INTERNAL MEDICINE

## 2017-01-01 PROCEDURE — 99214 OFFICE O/P EST MOD 30 MIN: CPT | Mod: ZP | Performed by: NURSE PRACTITIONER

## 2017-01-01 PROCEDURE — 80076 HEPATIC FUNCTION PANEL: CPT

## 2017-01-01 PROCEDURE — 83880 ASSAY OF NATRIURETIC PEPTIDE: CPT | Performed by: EMERGENCY MEDICINE

## 2017-01-01 PROCEDURE — 93750 INTERROGATION VAD IN PERSON: CPT

## 2017-01-01 PROCEDURE — 25000132 ZZH RX MED GY IP 250 OP 250 PS 637: Performed by: STUDENT IN AN ORGANIZED HEALTH CARE EDUCATION/TRAINING PROGRAM

## 2017-01-01 PROCEDURE — 40000802 ZZH SITE CHECK

## 2017-01-01 PROCEDURE — 80048 BASIC METABOLIC PNL TOTAL CA: CPT | Performed by: EMERGENCY MEDICINE

## 2017-01-01 PROCEDURE — 84100 ASSAY OF PHOSPHORUS: CPT | Performed by: INTERNAL MEDICINE

## 2017-01-01 PROCEDURE — 82947 ASSAY GLUCOSE BLOOD QUANT: CPT

## 2017-01-01 PROCEDURE — 99606 MTMS BY PHARM EST 15 MIN: CPT | Performed by: PHARMACIST

## 2017-01-01 PROCEDURE — 82728 ASSAY OF FERRITIN: CPT | Performed by: INTERNAL MEDICINE

## 2017-01-01 PROCEDURE — 87086 URINE CULTURE/COLONY COUNT: CPT | Performed by: EMERGENCY MEDICINE

## 2017-01-01 PROCEDURE — 83735 ASSAY OF MAGNESIUM: CPT | Performed by: INTERNAL MEDICINE

## 2017-01-01 PROCEDURE — 85027 COMPLETE CBC AUTOMATED: CPT | Performed by: NURSE PRACTITIONER

## 2017-01-01 PROCEDURE — 82550 ASSAY OF CK (CPK): CPT | Performed by: INTERNAL MEDICINE

## 2017-01-01 PROCEDURE — 99212 OFFICE O/P EST SF 10 MIN: CPT | Mod: ZF

## 2017-01-01 PROCEDURE — 99285 EMERGENCY DEPT VISIT HI MDM: CPT | Mod: Z6 | Performed by: EMERGENCY MEDICINE

## 2017-01-01 PROCEDURE — 25000128 H RX IP 250 OP 636: Performed by: STUDENT IN AN ORGANIZED HEALTH CARE EDUCATION/TRAINING PROGRAM

## 2017-01-01 PROCEDURE — 83010 ASSAY OF HAPTOGLOBIN QUANT: CPT | Performed by: EMERGENCY MEDICINE

## 2017-01-01 PROCEDURE — 80053 COMPREHEN METABOLIC PANEL: CPT | Performed by: INTERNAL MEDICINE

## 2017-01-01 PROCEDURE — 81001 URINALYSIS AUTO W/SCOPE: CPT | Performed by: EMERGENCY MEDICINE

## 2017-01-01 PROCEDURE — 80048 BASIC METABOLIC PNL TOTAL CA: CPT

## 2017-01-01 PROCEDURE — 93306 TTE W/DOPPLER COMPLETE: CPT

## 2017-01-01 PROCEDURE — 85520 HEPARIN ASSAY: CPT | Performed by: STUDENT IN AN ORGANIZED HEALTH CARE EDUCATION/TRAINING PROGRAM

## 2017-01-01 PROCEDURE — 80053 COMPREHEN METABOLIC PANEL: CPT | Performed by: HOSPITALIST

## 2017-01-01 PROCEDURE — 99214 OFFICE O/P EST MOD 30 MIN: CPT | Mod: 25 | Performed by: NURSE PRACTITIONER

## 2017-01-01 PROCEDURE — 80053 COMPREHEN METABOLIC PANEL: CPT | Performed by: EMERGENCY MEDICINE

## 2017-01-01 PROCEDURE — 84295 ASSAY OF SERUM SODIUM: CPT

## 2017-01-01 PROCEDURE — 85045 AUTOMATED RETICULOCYTE COUNT: CPT | Performed by: STUDENT IN AN ORGANIZED HEALTH CARE EDUCATION/TRAINING PROGRAM

## 2017-01-01 PROCEDURE — 85610 PROTHROMBIN TIME: CPT | Performed by: NURSE PRACTITIONER

## 2017-01-01 PROCEDURE — 36415 COLL VENOUS BLD VENIPUNCTURE: CPT | Performed by: NURSE PRACTITIONER

## 2017-01-01 PROCEDURE — 80048 BASIC METABOLIC PNL TOTAL CA: CPT | Performed by: STUDENT IN AN ORGANIZED HEALTH CARE EDUCATION/TRAINING PROGRAM

## 2017-01-01 PROCEDURE — 74176 CT ABD & PELVIS W/O CONTRAST: CPT

## 2017-01-01 PROCEDURE — G0179 MD RECERTIFICATION HHA PT: HCPCS

## 2017-01-01 PROCEDURE — 20600001 ZZH R&B ICU INTERMEDIATE UMMC

## 2017-01-01 PROCEDURE — 83615 LACTATE (LD) (LDH) ENZYME: CPT | Performed by: STUDENT IN AN ORGANIZED HEALTH CARE EDUCATION/TRAINING PROGRAM

## 2017-01-01 PROCEDURE — 83615 LACTATE (LD) (LDH) ENZYME: CPT | Performed by: HOSPITALIST

## 2017-01-01 PROCEDURE — 82435 ASSAY OF BLOOD CHLORIDE: CPT

## 2017-01-01 PROCEDURE — 96365 THER/PROPH/DIAG IV INF INIT: CPT | Performed by: EMERGENCY MEDICINE

## 2017-01-01 PROCEDURE — T1013 SIGN LANG/ORAL INTERPRETER: HCPCS | Mod: U3

## 2017-01-01 PROCEDURE — 25000128 H RX IP 250 OP 636: Performed by: EMERGENCY MEDICINE

## 2017-01-01 PROCEDURE — 84460 ALANINE AMINO (ALT) (SGPT): CPT

## 2017-01-01 PROCEDURE — 83550 IRON BINDING TEST: CPT | Performed by: INTERNAL MEDICINE

## 2017-01-01 PROCEDURE — 93010 ELECTROCARDIOGRAM REPORT: CPT | Mod: Z6 | Performed by: EMERGENCY MEDICINE

## 2017-01-01 PROCEDURE — 84132 ASSAY OF SERUM POTASSIUM: CPT

## 2017-01-01 PROCEDURE — 80053 COMPREHEN METABOLIC PANEL: CPT | Performed by: NURSE PRACTITIONER

## 2017-01-01 PROCEDURE — 36415 COLL VENOUS BLD VENIPUNCTURE: CPT

## 2017-01-01 PROCEDURE — 83605 ASSAY OF LACTIC ACID: CPT | Performed by: EMERGENCY MEDICINE

## 2017-01-01 PROCEDURE — 87641 MR-STAPH DNA AMP PROBE: CPT | Performed by: INTERNAL MEDICINE

## 2017-01-01 PROCEDURE — 21400006 ZZH R&B CCU INTERMEDIATE UMMC

## 2017-01-01 PROCEDURE — 85610 PROTHROMBIN TIME: CPT | Performed by: STUDENT IN AN ORGANIZED HEALTH CARE EDUCATION/TRAINING PROGRAM

## 2017-01-01 PROCEDURE — 99223 1ST HOSP IP/OBS HIGH 75: CPT | Mod: 25 | Performed by: INTERNAL MEDICINE

## 2017-01-01 PROCEDURE — 87086 URINE CULTURE/COLONY COUNT: CPT | Performed by: UROLOGY

## 2017-01-01 PROCEDURE — 84155 ASSAY OF PROTEIN SERUM: CPT

## 2017-01-01 PROCEDURE — 83051 HEMOGLOBIN PLASMA: CPT | Performed by: STUDENT IN AN ORGANIZED HEALTH CARE EDUCATION/TRAINING PROGRAM

## 2017-01-01 PROCEDURE — 99607 MTMS BY PHARM ADDL 15 MIN: CPT | Performed by: PHARMACIST

## 2017-01-01 PROCEDURE — 83036 HEMOGLOBIN GLYCOSYLATED A1C: CPT | Performed by: INTERNAL MEDICINE

## 2017-01-01 PROCEDURE — 84520 ASSAY OF UREA NITROGEN: CPT

## 2017-01-01 PROCEDURE — 84484 ASSAY OF TROPONIN QUANT: CPT | Performed by: EMERGENCY MEDICINE

## 2017-01-01 PROCEDURE — 99215 OFFICE O/P EST HI 40 MIN: CPT | Mod: 25,ZF

## 2017-01-01 PROCEDURE — 87640 STAPH A DNA AMP PROBE: CPT | Performed by: INTERNAL MEDICINE

## 2017-01-01 PROCEDURE — 82565 ASSAY OF CREATININE: CPT

## 2017-01-01 PROCEDURE — 85018 HEMOGLOBIN: CPT | Performed by: INTERNAL MEDICINE

## 2017-01-01 PROCEDURE — 25000132 ZZH RX MED GY IP 250 OP 250 PS 637

## 2017-01-01 PROCEDURE — 83605 ASSAY OF LACTIC ACID: CPT | Performed by: INTERNAL MEDICINE

## 2017-01-01 PROCEDURE — 83540 ASSAY OF IRON: CPT | Performed by: INTERNAL MEDICINE

## 2017-01-01 PROCEDURE — 99233 SBSQ HOSP IP/OBS HIGH 50: CPT | Mod: GC | Performed by: INTERNAL MEDICINE

## 2017-01-01 PROCEDURE — 93306 TTE W/DOPPLER COMPLETE: CPT | Mod: 26 | Performed by: INTERNAL MEDICINE

## 2017-01-01 PROCEDURE — 83051 HEMOGLOBIN PLASMA: CPT | Performed by: EMERGENCY MEDICINE

## 2017-01-01 PROCEDURE — 84100 ASSAY OF PHOSPHORUS: CPT | Performed by: STUDENT IN AN ORGANIZED HEALTH CARE EDUCATION/TRAINING PROGRAM

## 2017-01-01 PROCEDURE — 81001 URINALYSIS AUTO W/SCOPE: CPT

## 2017-01-01 PROCEDURE — 84075 ASSAY ALKALINE PHOSPHATASE: CPT

## 2017-01-01 PROCEDURE — 82247 BILIRUBIN TOTAL: CPT

## 2017-01-01 PROCEDURE — 85520 HEPARIN ASSAY: CPT | Performed by: INTERNAL MEDICINE

## 2017-01-01 PROCEDURE — 82040 ASSAY OF SERUM ALBUMIN: CPT

## 2017-01-01 PROCEDURE — 85025 COMPLETE CBC W/AUTO DIFF WBC: CPT | Performed by: EMERGENCY MEDICINE

## 2017-01-01 PROCEDURE — 93005 ELECTROCARDIOGRAM TRACING: CPT | Performed by: EMERGENCY MEDICINE

## 2017-01-01 PROCEDURE — 80069 RENAL FUNCTION PANEL: CPT | Performed by: INTERNAL MEDICINE

## 2017-01-01 PROCEDURE — 85520 HEPARIN ASSAY: CPT | Performed by: EMERGENCY MEDICINE

## 2017-01-01 PROCEDURE — 36415 COLL VENOUS BLD VENIPUNCTURE: CPT | Performed by: HOSPITALIST

## 2017-01-01 PROCEDURE — 86140 C-REACTIVE PROTEIN: CPT | Performed by: HOSPITALIST

## 2017-01-01 PROCEDURE — 85025 COMPLETE CBC W/AUTO DIFF WBC: CPT

## 2017-01-01 PROCEDURE — 82306 VITAMIN D 25 HYDROXY: CPT | Performed by: INTERNAL MEDICINE

## 2017-01-01 PROCEDURE — 85379 FIBRIN DEGRADATION QUANT: CPT | Performed by: EMERGENCY MEDICINE

## 2017-01-01 PROCEDURE — 83970 ASSAY OF PARATHORMONE: CPT | Performed by: INTERNAL MEDICINE

## 2017-01-01 PROCEDURE — 85025 COMPLETE CBC W/AUTO DIFF WBC: CPT | Performed by: INTERNAL MEDICINE

## 2017-01-01 PROCEDURE — 85610 PROTHROMBIN TIME: CPT | Mod: QW

## 2017-01-01 PROCEDURE — 85025 COMPLETE CBC W/AUTO DIFF WBC: CPT | Performed by: STUDENT IN AN ORGANIZED HEALTH CARE EDUCATION/TRAINING PROGRAM

## 2017-01-01 PROCEDURE — 82550 ASSAY OF CK (CPK): CPT | Performed by: STUDENT IN AN ORGANIZED HEALTH CARE EDUCATION/TRAINING PROGRAM

## 2017-01-01 PROCEDURE — 96361 HYDRATE IV INFUSION ADD-ON: CPT | Performed by: EMERGENCY MEDICINE

## 2017-01-01 PROCEDURE — 71010 XR CHEST PORT 1 VW: CPT

## 2017-01-01 PROCEDURE — 85027 COMPLETE CBC AUTOMATED: CPT

## 2017-01-01 PROCEDURE — 85027 COMPLETE CBC AUTOMATED: CPT | Performed by: HOSPITALIST

## 2017-01-01 PROCEDURE — 40000893 ZZH STATISTIC PT IP EVAL DEFER

## 2017-01-01 PROCEDURE — 85610 PROTHROMBIN TIME: CPT | Performed by: HOSPITALIST

## 2017-01-01 PROCEDURE — 83615 LACTATE (LD) (LDH) ENZYME: CPT | Performed by: INTERNAL MEDICINE

## 2017-01-01 PROCEDURE — 80053 COMPREHEN METABOLIC PANEL: CPT | Performed by: STUDENT IN AN ORGANIZED HEALTH CARE EDUCATION/TRAINING PROGRAM

## 2017-01-01 PROCEDURE — 87040 BLOOD CULTURE FOR BACTERIA: CPT | Performed by: STUDENT IN AN ORGANIZED HEALTH CARE EDUCATION/TRAINING PROGRAM

## 2017-01-01 RX ORDER — TAMSULOSIN HYDROCHLORIDE 0.4 MG/1
0.4 CAPSULE ORAL DAILY
Status: DISCONTINUED | OUTPATIENT
Start: 2017-01-01 | End: 2017-01-01 | Stop reason: HOSPADM

## 2017-01-01 RX ORDER — LEVETIRACETAM 250 MG/1
750 TABLET ORAL 2 TIMES DAILY
Status: DISCONTINUED | OUTPATIENT
Start: 2017-01-01 | End: 2017-01-01 | Stop reason: HOSPADM

## 2017-01-01 RX ORDER — POLYETHYLENE GLYCOL 3350 17 G/17G
17 POWDER, FOR SOLUTION ORAL ONCE
Status: DISCONTINUED | OUTPATIENT
Start: 2017-01-01 | End: 2017-01-01 | Stop reason: HOSPADM

## 2017-01-01 RX ORDER — SODIUM CHLORIDE 9 MG/ML
INJECTION, SOLUTION INTRAVENOUS CONTINUOUS
Status: DISCONTINUED | OUTPATIENT
Start: 2017-01-01 | End: 2017-01-01 | Stop reason: HOSPADM

## 2017-01-01 RX ORDER — WARFARIN SODIUM 3 MG/1
3 TABLET ORAL
Status: COMPLETED | OUTPATIENT
Start: 2017-01-01 | End: 2017-01-01

## 2017-01-01 RX ORDER — SIMETHICONE 80 MG
80 TABLET,CHEWABLE ORAL 4 TIMES DAILY
Status: DISCONTINUED | OUTPATIENT
Start: 2017-01-01 | End: 2017-01-01 | Stop reason: HOSPADM

## 2017-01-01 RX ORDER — ALLOPURINOL 100 MG/1
100 TABLET ORAL DAILY
Status: DISCONTINUED | OUTPATIENT
Start: 2017-01-01 | End: 2017-01-01 | Stop reason: HOSPADM

## 2017-01-01 RX ORDER — LIDOCAINE 40 MG/G
CREAM TOPICAL
Status: DISCONTINUED | OUTPATIENT
Start: 2017-01-01 | End: 2017-01-01 | Stop reason: HOSPADM

## 2017-01-01 RX ORDER — GABAPENTIN 100 MG/1
100 CAPSULE ORAL 2 TIMES DAILY
Status: DISCONTINUED | OUTPATIENT
Start: 2017-01-01 | End: 2017-01-01 | Stop reason: HOSPADM

## 2017-01-01 RX ORDER — NYSTATIN 100000 [USP'U]/G
POWDER TOPICAL
Qty: 60 G | Refills: 3 | Status: SHIPPED | OUTPATIENT
Start: 2017-01-01 | End: 2018-01-01

## 2017-01-01 RX ORDER — ALLOPURINOL 100 MG/1
TABLET ORAL
Qty: 90 TABLET | Refills: 3 | OUTPATIENT
Start: 2017-01-01

## 2017-01-01 RX ORDER — METOPROLOL SUCCINATE 50 MG/1
50 TABLET, EXTENDED RELEASE ORAL DAILY
Status: DISCONTINUED | OUTPATIENT
Start: 2017-01-01 | End: 2017-01-01 | Stop reason: HOSPADM

## 2017-01-01 RX ORDER — NITROGLYCERIN 0.4 MG/1
0.4 TABLET SUBLINGUAL EVERY 5 MIN PRN
Status: DISCONTINUED | OUTPATIENT
Start: 2017-01-01 | End: 2017-01-01 | Stop reason: HOSPADM

## 2017-01-01 RX ORDER — METOPROLOL SUCCINATE 25 MG/1
50 TABLET, EXTENDED RELEASE ORAL EVERY MORNING
Status: DISCONTINUED | OUTPATIENT
Start: 2017-01-01 | End: 2017-01-01 | Stop reason: HOSPADM

## 2017-01-01 RX ORDER — PANTOPRAZOLE SODIUM 40 MG/1
40 TABLET, DELAYED RELEASE ORAL DAILY
Status: DISCONTINUED | OUTPATIENT
Start: 2017-01-01 | End: 2017-01-01 | Stop reason: HOSPADM

## 2017-01-01 RX ORDER — ALLOPURINOL 100 MG/1
100 TABLET ORAL DAILY
Status: DISCONTINUED | OUTPATIENT
Start: 2017-01-01 | End: 2017-01-01

## 2017-01-01 RX ORDER — LOSARTAN POTASSIUM 25 MG/1
12.5 TABLET ORAL DAILY
Qty: 45 TABLET | Refills: 3 | COMMUNITY
Start: 2017-01-01 | End: 2017-01-01

## 2017-01-01 RX ORDER — HEPARIN SODIUM 10000 [USP'U]/100ML
0-3500 INJECTION, SOLUTION INTRAVENOUS CONTINUOUS
Status: DISCONTINUED | OUTPATIENT
Start: 2017-01-01 | End: 2017-01-01

## 2017-01-01 RX ORDER — METOPROLOL SUCCINATE 25 MG/1
25 TABLET, EXTENDED RELEASE ORAL AT BEDTIME
Status: DISCONTINUED | OUTPATIENT
Start: 2017-01-01 | End: 2017-01-01 | Stop reason: HOSPADM

## 2017-01-01 RX ORDER — ACETAMINOPHEN 325 MG/1
650 TABLET ORAL EVERY 6 HOURS PRN
Status: DISCONTINUED | OUTPATIENT
Start: 2017-01-01 | End: 2017-01-01 | Stop reason: HOSPADM

## 2017-01-01 RX ORDER — NALOXONE HYDROCHLORIDE 0.4 MG/ML
.1-.4 INJECTION, SOLUTION INTRAMUSCULAR; INTRAVENOUS; SUBCUTANEOUS
Status: DISCONTINUED | OUTPATIENT
Start: 2017-01-01 | End: 2017-01-01 | Stop reason: HOSPADM

## 2017-01-01 RX ORDER — METOPROLOL TARTRATE 25 MG/1
TABLET, FILM COATED ORAL
Qty: 60 TABLET | COMMUNITY
Start: 2017-01-01 | End: 2017-01-01 | Stop reason: DRUGHIGH

## 2017-01-01 RX ORDER — WARFARIN SODIUM 4 MG/1
4 TABLET ORAL
Status: COMPLETED | OUTPATIENT
Start: 2017-01-01 | End: 2017-01-01

## 2017-01-01 RX ORDER — PANTOPRAZOLE SODIUM 40 MG/1
TABLET, DELAYED RELEASE ORAL
Qty: 180 TABLET | Refills: 2 | OUTPATIENT
Start: 2017-01-01

## 2017-01-01 RX ORDER — METOPROLOL SUCCINATE 25 MG/1
50 TABLET, EXTENDED RELEASE ORAL 2 TIMES DAILY
Status: DISCONTINUED | OUTPATIENT
Start: 2017-01-01 | End: 2017-01-01

## 2017-01-01 RX ORDER — LORATADINE 10 MG/1
TABLET ORAL
Qty: 90 TABLET | Refills: 2 | Status: ON HOLD | OUTPATIENT
Start: 2017-01-01 | End: 2018-01-01

## 2017-01-01 RX ORDER — ACETAMINOPHEN 325 MG/1
650 TABLET ORAL EVERY 6 HOURS PRN
Qty: 100 TABLET | Refills: 0 | Status: ON HOLD | COMMUNITY
Start: 2017-01-01 | End: 2018-01-01

## 2017-01-01 RX ORDER — LANOLIN ALCOHOL/MO/W.PET/CERES
3 CREAM (GRAM) TOPICAL AT BEDTIME
Status: DISCONTINUED | OUTPATIENT
Start: 2017-01-01 | End: 2017-01-01 | Stop reason: HOSPADM

## 2017-01-01 RX ORDER — FINASTERIDE 5 MG/1
5 TABLET, FILM COATED ORAL DAILY
Status: DISCONTINUED | OUTPATIENT
Start: 2017-01-01 | End: 2017-01-01 | Stop reason: HOSPADM

## 2017-01-01 RX ORDER — OXYCODONE HYDROCHLORIDE 5 MG/1
5 TABLET ORAL EVERY 4 HOURS PRN
Status: DISCONTINUED | OUTPATIENT
Start: 2017-01-01 | End: 2017-01-01 | Stop reason: HOSPADM

## 2017-01-01 RX ORDER — WARFARIN SODIUM 2 MG/1
2 TABLET ORAL
Status: COMPLETED | OUTPATIENT
Start: 2017-01-01 | End: 2017-01-01

## 2017-01-01 RX ORDER — LORATADINE 10 MG/1
10 TABLET ORAL DAILY
Status: DISCONTINUED | OUTPATIENT
Start: 2017-01-01 | End: 2017-01-01 | Stop reason: HOSPADM

## 2017-01-01 RX ORDER — WARFARIN SODIUM 3 MG/1
4.5 TABLET ORAL DAILY
COMMUNITY
End: 2018-01-01

## 2017-01-01 RX ORDER — ATORVASTATIN CALCIUM 10 MG/1
10 TABLET, FILM COATED ORAL DAILY
Status: DISCONTINUED | OUTPATIENT
Start: 2017-01-01 | End: 2017-01-01

## 2017-01-01 RX ORDER — GABAPENTIN 100 MG/1
100 CAPSULE ORAL 2 TIMES DAILY
Qty: 60 CAPSULE | Refills: 3 | Status: SHIPPED | OUTPATIENT
Start: 2017-01-01 | End: 2017-01-01

## 2017-01-01 RX ORDER — PANTOPRAZOLE SODIUM 20 MG/1
TABLET, DELAYED RELEASE ORAL
Qty: 90 TABLET | Refills: 3 | Status: ON HOLD | OUTPATIENT
Start: 2017-01-01 | End: 2017-01-01

## 2017-01-01 RX ORDER — GABAPENTIN 100 MG/1
100 CAPSULE ORAL 2 TIMES DAILY PRN
Qty: 60 CAPSULE | Refills: 3 | COMMUNITY
Start: 2017-01-01 | End: 2018-01-01

## 2017-01-01 RX ORDER — LOSARTAN POTASSIUM 25 MG/1
12.5 TABLET ORAL DAILY
Qty: 45 TABLET | Refills: 3 | Status: ON HOLD | OUTPATIENT
Start: 2017-01-01 | End: 2017-01-01

## 2017-01-01 RX ORDER — LIDOCAINE HYDROCHLORIDE 10 MG/ML
1 INJECTION, SOLUTION EPIDURAL; INFILTRATION; INTRACAUDAL; PERINEURAL ONCE
Status: COMPLETED | OUTPATIENT
Start: 2017-01-01 | End: 2017-01-01

## 2017-01-01 RX ORDER — LANOLIN ALCOHOL/MO/W.PET/CERES
100 CREAM (GRAM) TOPICAL DAILY
Status: DISCONTINUED | OUTPATIENT
Start: 2017-01-01 | End: 2017-01-01 | Stop reason: HOSPADM

## 2017-01-01 RX ORDER — BISACODYL 5 MG
5 TABLET, DELAYED RELEASE (ENTERIC COATED) ORAL DAILY PRN
Status: DISCONTINUED | OUTPATIENT
Start: 2017-01-01 | End: 2017-01-01 | Stop reason: HOSPADM

## 2017-01-01 RX ORDER — BISACODYL 10 MG
10 SUPPOSITORY, RECTAL RECTAL DAILY PRN
Status: DISCONTINUED | OUTPATIENT
Start: 2017-01-01 | End: 2017-01-01

## 2017-01-01 RX ORDER — LEVETIRACETAM 750 MG/1
750 TABLET ORAL 2 TIMES DAILY
Status: DISCONTINUED | OUTPATIENT
Start: 2017-01-01 | End: 2017-01-01 | Stop reason: HOSPADM

## 2017-01-01 RX ORDER — PANTOPRAZOLE SODIUM 20 MG/1
20 TABLET, DELAYED RELEASE ORAL EVERY MORNING
Status: DISCONTINUED | OUTPATIENT
Start: 2017-01-01 | End: 2017-01-01

## 2017-01-01 RX ORDER — AMOXICILLIN 250 MG
1 CAPSULE ORAL AT BEDTIME
Status: DISCONTINUED | OUTPATIENT
Start: 2017-01-01 | End: 2017-01-01 | Stop reason: HOSPADM

## 2017-01-01 RX ORDER — FUROSEMIDE 20 MG
20 TABLET ORAL PRN
Qty: 20 TABLET | Refills: 11 | Status: ON HOLD | OUTPATIENT
Start: 2017-01-01 | End: 2018-01-01

## 2017-01-01 RX ORDER — BISACODYL 10 MG
10 SUPPOSITORY, RECTAL RECTAL DAILY PRN
Status: DISCONTINUED | OUTPATIENT
Start: 2017-01-01 | End: 2017-01-01 | Stop reason: HOSPADM

## 2017-01-01 RX ORDER — AMOXICILLIN 250 MG
2 CAPSULE ORAL
Status: DISCONTINUED | OUTPATIENT
Start: 2017-01-01 | End: 2017-01-01 | Stop reason: HOSPADM

## 2017-01-01 RX ORDER — ACETAMINOPHEN 325 MG/1
650 TABLET ORAL EVERY 6 HOURS
Status: DISCONTINUED | OUTPATIENT
Start: 2017-01-01 | End: 2017-01-01 | Stop reason: HOSPADM

## 2017-01-01 RX ORDER — LACTULOSE 10 G/15ML
10 SOLUTION ORAL ONCE
Status: COMPLETED | OUTPATIENT
Start: 2017-01-01 | End: 2017-01-01

## 2017-01-01 RX ORDER — MAGNESIUM OXIDE 400 MG/1
400 TABLET ORAL 2 TIMES DAILY
Status: DISCONTINUED | OUTPATIENT
Start: 2017-01-01 | End: 2017-01-01 | Stop reason: HOSPADM

## 2017-01-01 RX ORDER — ATORVASTATIN CALCIUM 10 MG/1
10 TABLET, FILM COATED ORAL DAILY
Status: DISCONTINUED | OUTPATIENT
Start: 2017-01-01 | End: 2017-01-01 | Stop reason: HOSPADM

## 2017-01-01 RX ORDER — MAGNESIUM OXIDE 400 MG/1
400 TABLET ORAL 2 TIMES DAILY
Qty: 60 TABLET | Refills: 1 | Status: ON HOLD | OUTPATIENT
Start: 2017-01-01 | End: 2017-01-01

## 2017-01-01 RX ORDER — ALUMINA, MAGNESIA, AND SIMETHICONE 2400; 2400; 240 MG/30ML; MG/30ML; MG/30ML
15-30 SUSPENSION ORAL EVERY 4 HOURS PRN
Status: DISCONTINUED | OUTPATIENT
Start: 2017-01-01 | End: 2017-01-01 | Stop reason: HOSPADM

## 2017-01-01 RX ADMIN — TAMSULOSIN HYDROCHLORIDE 0.4 MG: 0.4 CAPSULE ORAL at 09:37

## 2017-01-01 RX ADMIN — MICONAZOLE NITRATE: 2 POWDER TOPICAL at 08:38

## 2017-01-01 RX ADMIN — ATORVASTATIN CALCIUM 10 MG: 10 TABLET, FILM COATED ORAL at 09:37

## 2017-01-01 RX ADMIN — LEVETIRACETAM 750 MG: 750 TABLET, FILM COATED ORAL at 08:02

## 2017-01-01 RX ADMIN — Medication 12.5 MG: at 08:19

## 2017-01-01 RX ADMIN — FINASTERIDE 5 MG: 5 TABLET, FILM COATED ORAL at 08:52

## 2017-01-01 RX ADMIN — MELATONIN TAB 3 MG 3 MG: 3 TAB at 22:51

## 2017-01-01 RX ADMIN — KETOTIFEN FUMARATE 1 DROP: 0.35 SOLUTION/ DROPS OPHTHALMIC at 08:48

## 2017-01-01 RX ADMIN — KETOTIFEN FUMARATE 1 DROP: 0.35 SOLUTION/ DROPS OPHTHALMIC at 19:56

## 2017-01-01 RX ADMIN — Medication 4.5 MG: at 18:13

## 2017-01-01 RX ADMIN — ALLOPURINOL 100 MG: 100 TABLET ORAL at 09:30

## 2017-01-01 RX ADMIN — METOPROLOL SUCCINATE 50 MG: 25 TABLET, EXTENDED RELEASE ORAL at 08:37

## 2017-01-01 RX ADMIN — LEVETIRACETAM 750 MG: 750 TABLET, FILM COATED ORAL at 20:12

## 2017-01-01 RX ADMIN — GABAPENTIN 100 MG: 100 CAPSULE ORAL at 09:37

## 2017-01-01 RX ADMIN — METOPROLOL SUCCINATE 25 MG: 25 TABLET, EXTENDED RELEASE ORAL at 21:52

## 2017-01-01 RX ADMIN — LEVETIRACETAM 750 MG: 750 TABLET, FILM COATED ORAL at 20:00

## 2017-01-01 RX ADMIN — METOPROLOL SUCCINATE 50 MG: 25 TABLET, EXTENDED RELEASE ORAL at 08:52

## 2017-01-01 RX ADMIN — ATORVASTATIN CALCIUM 10 MG: 10 TABLET, FILM COATED ORAL at 09:31

## 2017-01-01 RX ADMIN — FINASTERIDE 5 MG: 5 TABLET, FILM COATED ORAL at 08:38

## 2017-01-01 RX ADMIN — ACETAMINOPHEN 650 MG: 325 TABLET ORAL at 09:37

## 2017-01-01 RX ADMIN — MELATONIN TAB 3 MG 3 MG: 3 TAB at 22:43

## 2017-01-01 RX ADMIN — METOPROLOL SUCCINATE 50 MG: 25 TABLET, EXTENDED RELEASE ORAL at 08:02

## 2017-01-01 RX ADMIN — BISACODYL 10 MG: 10 SUPPOSITORY RECTAL at 14:15

## 2017-01-01 RX ADMIN — KETOTIFEN FUMARATE 1 DROP: 0.35 SOLUTION/ DROPS OPHTHALMIC at 08:52

## 2017-01-01 RX ADMIN — SENNOSIDES AND DOCUSATE SODIUM 1 TABLET: 8.6; 5 TABLET ORAL at 09:47

## 2017-01-01 RX ADMIN — Medication 100 MG: at 08:16

## 2017-01-01 RX ADMIN — SIMETHICONE CHEW TAB 80 MG 80 MG: 80 TABLET ORAL at 15:07

## 2017-01-01 RX ADMIN — KETOTIFEN FUMARATE 1 DROP: 0.35 SOLUTION/ DROPS OPHTHALMIC at 09:20

## 2017-01-01 RX ADMIN — KETOTIFEN FUMARATE 1 DROP: 0.35 SOLUTION/ DROPS OPHTHALMIC at 19:52

## 2017-01-01 RX ADMIN — HEPARIN SODIUM 1000 UNITS/HR: 10000 INJECTION, SOLUTION INTRAVENOUS at 01:30

## 2017-01-01 RX ADMIN — LEVETIRACETAM 750 MG: 250 TABLET, FILM COATED ORAL at 08:44

## 2017-01-01 RX ADMIN — LEVETIRACETAM 750 MG: 250 TABLET, FILM COATED ORAL at 08:14

## 2017-01-01 RX ADMIN — TAMSULOSIN HYDROCHLORIDE 0.4 MG: 0.4 CAPSULE ORAL at 08:37

## 2017-01-01 RX ADMIN — MAGNESIUM OXIDE TAB 400 MG (241.3 MG ELEMENTAL MG) 400 MG: 400 (241.3 MG) TAB at 08:44

## 2017-01-01 RX ADMIN — LEVETIRACETAM 750 MG: 250 TABLET, FILM COATED ORAL at 19:52

## 2017-01-01 RX ADMIN — ACETAMINOPHEN 650 MG: 325 TABLET, FILM COATED ORAL at 00:19

## 2017-01-01 RX ADMIN — ACETAMINOPHEN 650 MG: 325 TABLET ORAL at 20:00

## 2017-01-01 RX ADMIN — FINASTERIDE 5 MG: 5 TABLET, FILM COATED ORAL at 08:03

## 2017-01-01 RX ADMIN — METOPROLOL SUCCINATE 50 MG: 25 TABLET, EXTENDED RELEASE ORAL at 09:37

## 2017-01-01 RX ADMIN — ATORVASTATIN CALCIUM 10 MG: 10 TABLET, FILM COATED ORAL at 08:51

## 2017-01-01 RX ADMIN — Medication 12.5 MG: at 09:37

## 2017-01-01 RX ADMIN — PANTOPRAZOLE SODIUM 20 MG: 20 TABLET, DELAYED RELEASE ORAL at 09:03

## 2017-01-01 RX ADMIN — GABAPENTIN 100 MG: 100 CAPSULE ORAL at 08:03

## 2017-01-01 RX ADMIN — SIMETHICONE CHEW TAB 80 MG 80 MG: 80 TABLET ORAL at 08:16

## 2017-01-01 RX ADMIN — LEVETIRACETAM 750 MG: 750 TABLET, FILM COATED ORAL at 10:18

## 2017-01-01 RX ADMIN — ACETAMINOPHEN 650 MG: 325 TABLET ORAL at 08:02

## 2017-01-01 RX ADMIN — ACETAMINOPHEN 650 MG: 325 TABLET ORAL at 23:19

## 2017-01-01 RX ADMIN — MELATONIN TAB 3 MG 3 MG: 3 TAB at 21:52

## 2017-01-01 RX ADMIN — ALLOPURINOL 100 MG: 100 TABLET ORAL at 09:37

## 2017-01-01 RX ADMIN — CALCIUM CARBONATE-VITAMIN D TAB 500 MG-200 UNIT 1 TABLET: 500-200 TAB at 08:44

## 2017-01-01 RX ADMIN — MELATONIN TAB 3 MG 3 MG: 3 TAB at 22:20

## 2017-01-01 RX ADMIN — SIMETHICONE CHEW TAB 80 MG 80 MG: 80 TABLET ORAL at 11:37

## 2017-01-01 RX ADMIN — LORATADINE 10 MG: 10 TABLET ORAL at 08:16

## 2017-01-01 RX ADMIN — ACETAMINOPHEN 650 MG: 325 TABLET, FILM COATED ORAL at 08:14

## 2017-01-01 RX ADMIN — SIMETHICONE CHEW TAB 80 MG 80 MG: 80 TABLET ORAL at 09:33

## 2017-01-01 RX ADMIN — ATORVASTATIN CALCIUM 10 MG: 10 TABLET, FILM COATED ORAL at 08:36

## 2017-01-01 RX ADMIN — METOPROLOL SUCCINATE 50 MG: 25 TABLET, EXTENDED RELEASE ORAL at 08:15

## 2017-01-01 RX ADMIN — PANTOPRAZOLE SODIUM 40 MG: 40 TABLET, DELAYED RELEASE ORAL at 08:16

## 2017-01-01 RX ADMIN — LEVETIRACETAM 750 MG: 750 TABLET, FILM COATED ORAL at 20:09

## 2017-01-01 RX ADMIN — SIMETHICONE CHEW TAB 80 MG 80 MG: 80 TABLET ORAL at 12:00

## 2017-01-01 RX ADMIN — WARFARIN SODIUM 2 MG: 2 TABLET ORAL at 17:58

## 2017-01-01 RX ADMIN — TAMSULOSIN HYDROCHLORIDE 0.4 MG: 0.4 CAPSULE ORAL at 08:02

## 2017-01-01 RX ADMIN — KETOTIFEN FUMARATE 1 DROP: 0.35 SOLUTION/ DROPS OPHTHALMIC at 08:13

## 2017-01-01 RX ADMIN — KETOTIFEN FUMARATE 1 DROP: 0.35 SOLUTION/ DROPS OPHTHALMIC at 20:12

## 2017-01-01 RX ADMIN — LORATADINE 10 MG: 10 TABLET ORAL at 08:44

## 2017-01-01 RX ADMIN — FINASTERIDE 5 MG: 5 TABLET, FILM COATED ORAL at 09:37

## 2017-01-01 RX ADMIN — MELATONIN TAB 3 MG 3 MG: 3 TAB at 23:19

## 2017-01-01 RX ADMIN — LEVETIRACETAM 750 MG: 250 TABLET, FILM COATED ORAL at 19:55

## 2017-01-01 RX ADMIN — ALLOPURINOL 100 MG: 100 TABLET ORAL at 08:02

## 2017-01-01 RX ADMIN — ACETAMINOPHEN 650 MG: 325 TABLET, FILM COATED ORAL at 22:00

## 2017-01-01 RX ADMIN — METOPROLOL SUCCINATE 25 MG: 25 TABLET, EXTENDED RELEASE ORAL at 22:18

## 2017-01-01 RX ADMIN — ATORVASTATIN CALCIUM 10 MG: 10 TABLET, FILM COATED ORAL at 08:02

## 2017-01-01 RX ADMIN — FINASTERIDE 5 MG: 5 TABLET, FILM COATED ORAL at 09:31

## 2017-01-01 RX ADMIN — WARFARIN SODIUM 3 MG: 3 TABLET ORAL at 17:30

## 2017-01-01 RX ADMIN — ACETAMINOPHEN 650 MG: 325 TABLET ORAL at 20:11

## 2017-01-01 RX ADMIN — CALCIUM CARBONATE-VITAMIN D TAB 500 MG-200 UNIT 1 TABLET: 500-200 TAB at 08:37

## 2017-01-01 RX ADMIN — MAGNESIUM OXIDE TAB 400 MG (241.3 MG ELEMENTAL MG) 400 MG: 400 (241.3 MG) TAB at 08:27

## 2017-01-01 RX ADMIN — LACTULOSE 10 G: 20 SOLUTION ORAL at 14:44

## 2017-01-01 RX ADMIN — Medication 100 MG: at 08:44

## 2017-01-01 RX ADMIN — KETOTIFEN FUMARATE 1 DROP: 0.35 SOLUTION/ DROPS OPHTHALMIC at 08:03

## 2017-01-01 RX ADMIN — ACETAMINOPHEN 650 MG: 325 TABLET ORAL at 15:10

## 2017-01-01 RX ADMIN — MAGNESIUM OXIDE TAB 400 MG (241.3 MG ELEMENTAL MG) 400 MG: 400 (241.3 MG) TAB at 19:56

## 2017-01-01 RX ADMIN — ALLOPURINOL 100 MG: 100 TABLET ORAL at 08:15

## 2017-01-01 RX ADMIN — METOPROLOL SUCCINATE 50 MG: 25 TABLET, EXTENDED RELEASE ORAL at 20:06

## 2017-01-01 RX ADMIN — METOPROLOL SUCCINATE 50 MG: 25 TABLET, EXTENDED RELEASE ORAL at 09:35

## 2017-01-01 RX ADMIN — DEXTROSE AND SODIUM CHLORIDE: 5; 900 INJECTION, SOLUTION INTRAVENOUS at 01:13

## 2017-01-01 RX ADMIN — ALLOPURINOL 100 MG: 100 TABLET ORAL at 08:52

## 2017-01-01 RX ADMIN — FINASTERIDE 5 MG: 5 TABLET, FILM COATED ORAL at 08:19

## 2017-01-01 RX ADMIN — TAMSULOSIN HYDROCHLORIDE 0.4 MG: 0.4 CAPSULE ORAL at 08:52

## 2017-01-01 RX ADMIN — LEVETIRACETAM 750 MG: 750 TABLET, FILM COATED ORAL at 00:01

## 2017-01-01 RX ADMIN — KETOTIFEN FUMARATE 1 DROP: 0.35 SOLUTION/ DROPS OPHTHALMIC at 20:09

## 2017-01-01 RX ADMIN — PANTOPRAZOLE SODIUM 40 MG: 40 TABLET, DELAYED RELEASE ORAL at 08:36

## 2017-01-01 RX ADMIN — ACETAMINOPHEN 650 MG: 325 TABLET ORAL at 03:03

## 2017-01-01 RX ADMIN — ACETAMINOPHEN 650 MG: 325 TABLET ORAL at 20:06

## 2017-01-01 RX ADMIN — TAMSULOSIN HYDROCHLORIDE 0.4 MG: 0.4 CAPSULE ORAL at 09:35

## 2017-01-01 RX ADMIN — WARFARIN SODIUM 4 MG: 4 TABLET ORAL at 17:46

## 2017-01-01 RX ADMIN — KETOTIFEN FUMARATE 1 DROP: 0.35 SOLUTION/ DROPS OPHTHALMIC at 08:38

## 2017-01-01 RX ADMIN — LEVETIRACETAM 750 MG: 750 TABLET, FILM COATED ORAL at 08:52

## 2017-01-01 RX ADMIN — SENNOSIDES AND DOCUSATE SODIUM 1 TABLET: 8.6; 5 TABLET ORAL at 20:10

## 2017-01-01 RX ADMIN — ACETAMINOPHEN 650 MG: 325 TABLET, FILM COATED ORAL at 16:43

## 2017-01-01 RX ADMIN — KETOTIFEN FUMARATE 1 DROP: 0.35 SOLUTION/ DROPS OPHTHALMIC at 00:01

## 2017-01-01 RX ADMIN — SODIUM CHLORIDE: 9 INJECTION, SOLUTION INTRAVENOUS at 15:00

## 2017-01-01 RX ADMIN — LIDOCAINE HYDROCHLORIDE 10 MG: 10 INJECTION, SOLUTION EPIDURAL; INFILTRATION; INTRACAUDAL; PERINEURAL at 14:13

## 2017-01-01 RX ADMIN — MAGNESIUM OXIDE TAB 400 MG (241.3 MG ELEMENTAL MG) 400 MG: 400 (241.3 MG) TAB at 08:36

## 2017-01-01 RX ADMIN — SODIUM CHLORIDE 500 ML: 9 INJECTION, SOLUTION INTRAVENOUS at 15:27

## 2017-01-01 RX ADMIN — METOPROLOL SUCCINATE 50 MG: 25 TABLET, EXTENDED RELEASE ORAL at 20:00

## 2017-01-01 RX ADMIN — TAMSULOSIN HYDROCHLORIDE 0.4 MG: 0.4 CAPSULE ORAL at 08:14

## 2017-01-01 RX ADMIN — METOPROLOL SUCCINATE 50 MG: 25 TABLET, EXTENDED RELEASE ORAL at 23:19

## 2017-01-01 RX ADMIN — SIMETHICONE CHEW TAB 80 MG 80 MG: 80 TABLET ORAL at 19:52

## 2017-01-01 RX ADMIN — Medication 12.5 MG: at 08:37

## 2017-01-01 RX ADMIN — METOPROLOL SUCCINATE 25 MG: 25 TABLET, EXTENDED RELEASE ORAL at 22:43

## 2017-01-01 RX ADMIN — ACETAMINOPHEN 650 MG: 325 TABLET ORAL at 13:21

## 2017-01-01 RX ADMIN — LORATADINE 10 MG: 10 TABLET ORAL at 08:37

## 2017-01-01 RX ADMIN — SENNOSIDES AND DOCUSATE SODIUM 1 TABLET: 8.6; 5 TABLET ORAL at 18:26

## 2017-01-01 RX ADMIN — ATORVASTATIN CALCIUM 10 MG: 10 TABLET, FILM COATED ORAL at 08:16

## 2017-01-01 RX ADMIN — ACETAMINOPHEN 650 MG: 325 TABLET ORAL at 08:51

## 2017-01-01 RX ADMIN — GABAPENTIN 100 MG: 100 CAPSULE ORAL at 23:19

## 2017-01-01 RX ADMIN — LEVETIRACETAM 750 MG: 250 TABLET, FILM COATED ORAL at 08:37

## 2017-01-01 RX ADMIN — PANTOPRAZOLE SODIUM 20 MG: 20 TABLET, DELAYED RELEASE ORAL at 08:52

## 2017-01-01 RX ADMIN — HEPARIN SODIUM 1000 UNITS/HR: 10000 INJECTION, SOLUTION INTRAVENOUS at 23:48

## 2017-01-01 RX ADMIN — MAGNESIUM OXIDE TAB 400 MG (241.3 MG ELEMENTAL MG) 400 MG: 400 (241.3 MG) TAB at 19:52

## 2017-01-01 RX ADMIN — CALCIUM CARBONATE-VITAMIN D TAB 500 MG-200 UNIT 1 TABLET: 500-200 TAB at 08:16

## 2017-01-01 RX ADMIN — DEXTROSE AND SODIUM CHLORIDE: 5; 900 INJECTION, SOLUTION INTRAVENOUS at 12:00

## 2017-01-01 RX ADMIN — ALLOPURINOL 100 MG: 100 TABLET ORAL at 08:37

## 2017-01-01 RX ADMIN — HEPARIN SODIUM 1000 UNITS/HR: 10000 INJECTION, SOLUTION INTRAVENOUS at 01:15

## 2017-01-01 RX ADMIN — Medication 12.5 MG: at 09:31

## 2017-01-01 RX ADMIN — SIMETHICONE CHEW TAB 80 MG 80 MG: 80 TABLET ORAL at 08:37

## 2017-01-01 RX ADMIN — SIMETHICONE CHEW TAB 80 MG 80 MG: 80 TABLET ORAL at 19:56

## 2017-01-01 RX ADMIN — PANTOPRAZOLE SODIUM 20 MG: 20 TABLET, DELAYED RELEASE ORAL at 09:37

## 2017-01-01 RX ADMIN — ACETAMINOPHEN 650 MG: 325 TABLET ORAL at 08:52

## 2017-01-01 RX ADMIN — Medication 100 MG: at 08:37

## 2017-01-01 RX ADMIN — BISACODYL 10 MG: 10 SUPPOSITORY RECTAL at 15:39

## 2017-01-01 RX ADMIN — PANTOPRAZOLE SODIUM 40 MG: 40 TABLET, DELAYED RELEASE ORAL at 08:38

## 2017-01-01 ASSESSMENT — ACTIVITIES OF DAILY LIVING (ADL)
TRANSFERRING: 4-->COMPLETELY DEPENDENT
TOILETING: 3-->ASSISTIVE EQUIPMENT AND PERSON
AMBULATION: 4-->COMPLETELY DEPENDENT
SWALLOWING: 0-->SWALLOWS FOODS/LIQUIDS WITHOUT DIFFICULTY
DRESS: 3-->ASSISTIVE EQUIPMENT AND PERSON
COGNITION: 0 - NO COGNITION ISSUES REPORTED
RETIRED_EATING: 2-->ASSISTIVE PERSON
RETIRED_COMMUNICATION: 0-->UNDERSTANDS/COMMUNICATES WITHOUT DIFFICULTY
BATHING: 3-->ASSISTIVE EQUIPMENT AND PERSON
FALL_HISTORY_WITHIN_LAST_SIX_MONTHS: NO

## 2017-01-01 ASSESSMENT — ENCOUNTER SYMPTOMS
FATIGUE: 1
FEVER: 0
FEVER: 0
RESPIRATORY NEGATIVE: 1
FEVER: 0
ABDOMINAL PAIN: 0
SHORTNESS OF BREATH: 1
ABDOMINAL PAIN: 0
SHORTNESS OF BREATH: 0
HEMATURIA: 1

## 2017-01-01 ASSESSMENT — PAIN DESCRIPTION - DESCRIPTORS
DESCRIPTORS: ACHING

## 2017-01-01 ASSESSMENT — PAIN SCALES - GENERAL
PAINLEVEL: NO PAIN (0)

## 2017-01-01 NOTE — TELEPHONE ENCOUNTER
FNA upgraded disposition to call provider right away d/t Sohan's symptoms and hx. FNA was connected with Natalia Walker (NP) via page  who gave verbal ok to perform a UA/UC if situation warranted need.     Carlita Stover RN  [facility]

## 2017-01-01 NOTE — TELEPHONE ENCOUNTER
Call Type: Triage Call    Presenting Problem: SHAWN Glover, calling from [facility] and  requesting UA/UC order. RN reporting the following symptoms for  Sohan: burning with urination with a hx. of bladder infections.  Afebrile (97.1 oral). Denies low back/ kidney pain. Denies bloody,  cloudy, or odor to urine. FNA gave verbal order to Belen (RN) from  Natalia Walker (NP): ok to do UA/UC. FNA advised to follow up with  clinic and/ or call FNA back. Caller verbalized understanding of  directives and had no further questions.  Triage Note:  Guideline Title: Information Only Call; No Symptom Triage (Adult)  Recommended Disposition: Call Provider When Office is Open  Original Inclination: Would have called clinic  Override Disposition:  Intended Action: Call PCP/HCP  Physician Contacted: Yes  Requesting information and provider is best resource; no triage required. ?  YES  Requesting regular office appointment ? NO  Sign(s) or symptom(s) associated with a diagnosed condition or with a new illness  ? NO  Requesting information about provider, services or community resources ? NO  Call back to complete assessment/clarification of information from prior caller to  complete triage ? NO  Physician Instructions:  Care Advice:

## 2017-01-06 ENCOUNTER — CARE COORDINATION (OUTPATIENT)
Dept: CARDIOLOGY | Facility: CLINIC | Age: 67
End: 2017-01-06

## 2017-01-07 NOTE — PROGRESS NOTES
D:  Pt's daughter, Elisha, called to report that the patient is scheduled for a clinic appointment on Wednesday, January 11 at 14:00.  However, their transport provider is unable to pick him up after 12:00.  Called to request rescheduled appointment.  I:  I spoke with Malinda Castaneda who arranged to exchange the appointment with another patient.  Mr. Rendon is now requested to come for labs at 09:40 and clinic at 10:00.  I left a message for the daughter with that information.  A:  Stable, needs change in appointment time.  P:  Per current plan.

## 2017-01-10 ENCOUNTER — ANTICOAGULATION THERAPY VISIT (OUTPATIENT)
Dept: ANTICOAGULATION | Facility: CLINIC | Age: 67
End: 2017-01-10

## 2017-01-10 ENCOUNTER — TELEPHONE (OUTPATIENT)
Dept: FAMILY MEDICINE | Facility: CLINIC | Age: 67
End: 2017-01-10

## 2017-01-10 ENCOUNTER — OFFICE VISIT (OUTPATIENT)
Dept: FAMILY MEDICINE | Facility: CLINIC | Age: 67
End: 2017-01-10

## 2017-01-10 DIAGNOSIS — Z53.9 ERRONEOUS ENCOUNTER--DISREGARD: Primary | ICD-10-CM

## 2017-01-10 DIAGNOSIS — Z79.01 LONG-TERM (CURRENT) USE OF ANTICOAGULANTS: Primary | ICD-10-CM

## 2017-01-10 DIAGNOSIS — Z95.811 LVAD (LEFT VENTRICULAR ASSIST DEVICE) PRESENT (H): ICD-10-CM

## 2017-01-10 LAB — INR PPP: 2.3

## 2017-01-10 NOTE — TELEPHONE ENCOUNTER
Representative needs clarification on the exact type of wheelchair being ordered.    Callback is Jessi [phone]

## 2017-01-10 NOTE — PROGRESS NOTES
ANTICOAGULATION FOLLOW-UP CLINIC VISIT    Patient Name:  Sohan Rendon  Date:  1/10/2017  Contact Type:  Telephone    SUBJECTIVE:     Patient Findings     Positives No Problem Findings           OBJECTIVE    INR   Date Value Ref Range Status   01/10/2017 2.3  Final     CHROMOGENIC FACTOR 10   Date Value Ref Range Status   08/10/2014 24* 70 - 130 % Final     Comment:     Therapeutic Range:  A Chromogenic Factor 10 level of approximately 20-40%   inversely correlates with an INR of 2-3 for patients receiving Warfarin.   Chromogenic Factor 10 levels below 20% indicate an INR greater than 3 and   levels above 40% indicate an INR less than 2.       FACTOR 2 ASSAY   Date Value Ref Range Status   07/21/2014 25* 60 - 140 % Final     Comment:     Analyte Specific Reagents (ASRs) are used in many laboratory tests necessary   for   standard medical care and generally do not require FDA approval.  This test   was   developed and its performance characteristics determined by [facility].  It has not been cleared or approved by   the [place] Food and Drug Administration.         ASSESSMENT / PLAN  INR assessment THER    Recheck INR In: 2 WEEKS    INR Location [facility]      Anticoagulation Summary as of 1/10/2017     INR goal    Prior goal 1.8-2.5   Selected INR 2.3 (1/10/2017)   Maintenance plan 3 mg (3 mg x 1) on Mon, Thu; 4.5 mg (3 mg x 1.5) all other days   Full instructions 3 mg on Mon, Thu; 4.5 mg all other days   Weekly total 28.5 mg   No change documented Sarah Osborne RN   Plan last modified Sarah Gill, RN (12/12/2016)   Next INR check 1/24/2017   Priority INR   Target end date Indefinite    Indications   LVAD (left ventricular assist device) present [Z95.811]  Long-term (current) use of anticoagulants [Z79.01] [Z79.01]         Anticoagulation Episode Summary     INR check location     Preferred lab     Send INR reminders to [facility]    Comments Goal  Range is 1.8-2.3  [facility] comes out to draw INR  Daughter Almaz Ph [phone]      Anticoagulation Care Providers     Provider Role Specialty Phone number    Evon Lemons MD Responsible Cardiology [phone]            See the Encounter Report to view Anticoagulation Flowsheet and Dosing Calendar (Go to Encounters tab in chart review, and find the Anticoagulation Therapy Visit)    Spoke with Sofya MCKINNON.  She reports no changes in health, diet, medications.    Sarah Osborne RN

## 2017-01-11 ENCOUNTER — OFFICE VISIT (OUTPATIENT)
Dept: CARDIOLOGY | Facility: CLINIC | Age: 67
End: 2017-01-11
Attending: INTERNAL MEDICINE
Payer: MEDICARE

## 2017-01-11 ENCOUNTER — ANTICOAGULATION THERAPY VISIT (OUTPATIENT)
Dept: ANTICOAGULATION | Facility: CLINIC | Age: 67
End: 2017-01-11

## 2017-01-11 VITALS — SYSTOLIC BLOOD PRESSURE: 68 MMHG | HEART RATE: 62 BPM | OXYGEN SATURATION: 98 %

## 2017-01-11 DIAGNOSIS — Z79.01 LONG-TERM (CURRENT) USE OF ANTICOAGULANTS: Primary | ICD-10-CM

## 2017-01-11 DIAGNOSIS — Z95.811 LVAD (LEFT VENTRICULAR ASSIST DEVICE) PRESENT (H): ICD-10-CM

## 2017-01-11 DIAGNOSIS — I50.22 CHRONIC SYSTOLIC CONGESTIVE HEART FAILURE (H): ICD-10-CM

## 2017-01-11 DIAGNOSIS — I50.22 CHRONIC SYSTOLIC CONGESTIVE HEART FAILURE (H): Primary | ICD-10-CM

## 2017-01-11 DIAGNOSIS — R30.0 DYSURIA: ICD-10-CM

## 2017-01-11 DIAGNOSIS — I25.5 ISCHEMIC CARDIOMYOPATHY: Primary | ICD-10-CM

## 2017-01-11 LAB
ALBUMIN SERPL-MCNC: 3.3 G/DL (ref 3.4–5)
ALP SERPL-CCNC: 96 U/L (ref 40–150)
ALT SERPL W P-5'-P-CCNC: 21 U/L (ref 0–70)
ANION GAP SERPL CALCULATED.3IONS-SCNC: 8 MMOL/L (ref 3–14)
AST SERPL W P-5'-P-CCNC: 36 U/L (ref 0–45)
BILIRUB SERPL-MCNC: 0.6 MG/DL (ref 0.2–1.3)
BUN SERPL-MCNC: 27 MG/DL (ref 7–30)
CALCIUM SERPL-MCNC: 8.6 MG/DL (ref 8.5–10.1)
CHLORIDE SERPL-SCNC: 109 MMOL/L (ref 94–109)
CO2 SERPL-SCNC: 24 MMOL/L (ref 20–32)
CREAT SERPL-MCNC: 1.83 MG/DL (ref 0.66–1.25)
ERYTHROCYTE [DISTWIDTH] IN BLOOD BY AUTOMATED COUNT: 16.2 % (ref 10–15)
GFR SERPL CREATININE-BSD FRML MDRD: 37 ML/MIN/1.7M2
GLUCOSE SERPL-MCNC: 132 MG/DL (ref 70–99)
HCT VFR BLD AUTO: 43.3 % (ref 40–53)
HGB BLD-MCNC: 14.7 G/DL (ref 13.3–17.7)
INR PPP: 2.06 (ref 0.86–1.14)
LDH SERPL L TO P-CCNC: 670 U/L (ref 85–227)
MCH RBC QN AUTO: 32.5 PG (ref 26.5–33)
MCHC RBC AUTO-ENTMCNC: 33.9 G/DL (ref 31.5–36.5)
MCV RBC AUTO: 96 FL (ref 78–100)
PLATELET # BLD AUTO: 138 10E9/L (ref 150–450)
POTASSIUM SERPL-SCNC: 3.9 MMOL/L (ref 3.4–5.3)
PROT SERPL-MCNC: 7.2 G/DL (ref 6.8–8.8)
RBC # BLD AUTO: 4.53 10E12/L (ref 4.4–5.9)
SODIUM SERPL-SCNC: 141 MMOL/L (ref 133–144)
WBC # BLD AUTO: 7.6 10E9/L (ref 4–11)

## 2017-01-11 PROCEDURE — 40000724 ZZH UMP OPEN ENCOUNTER >70 DAYS

## 2017-01-11 PROCEDURE — 99214 OFFICE O/P EST MOD 30 MIN: CPT | Mod: 25 | Performed by: INTERNAL MEDICINE

## 2017-01-11 PROCEDURE — 93750 INTERROGATION VAD IN PERSON: CPT | Mod: ZF | Performed by: INTERNAL MEDICINE

## 2017-01-11 PROCEDURE — 36415 COLL VENOUS BLD VENIPUNCTURE: CPT | Performed by: INTERNAL MEDICINE

## 2017-01-11 PROCEDURE — 93282 PRGRMG EVAL IMPLANTABLE DFB: CPT | Mod: ZF

## 2017-01-11 PROCEDURE — 85610 PROTHROMBIN TIME: CPT | Performed by: INTERNAL MEDICINE

## 2017-01-11 PROCEDURE — 83615 LACTATE (LD) (LDH) ENZYME: CPT | Performed by: INTERNAL MEDICINE

## 2017-01-11 PROCEDURE — 93289 INTERROG DEVICE EVAL HEART: CPT | Mod: 26 | Performed by: INTERNAL MEDICINE

## 2017-01-11 PROCEDURE — 80053 COMPREHEN METABOLIC PANEL: CPT | Performed by: INTERNAL MEDICINE

## 2017-01-11 PROCEDURE — 99215 OFFICE O/P EST HI 40 MIN: CPT | Mod: 25,ZF

## 2017-01-11 PROCEDURE — 85027 COMPLETE CBC AUTOMATED: CPT | Performed by: INTERNAL MEDICINE

## 2017-01-11 ASSESSMENT — PAIN SCALES - GENERAL: PAINLEVEL: NO PAIN (0)

## 2017-01-11 NOTE — PROGRESS NOTES
Pt seen in the [facility] for evaluation and iterative programming of a single lead ICD, per MD orders. Today his intrinsic rhythm is SB 54 bpm. Normal ICD function. No arrhythmias recorded. OptiVol thoracic impedance appears to be returning to his baseline. = 84.7%. No short v-v intervals recorded. Lead trends appear stable. Pt reports that he is feeling well. Battery measures 3.02 V and MELANIA is 2.63 V. Plan for pt to RTC in 3 months.

## 2017-01-11 NOTE — NURSING NOTE
1). PUMP DATA  Primary controller serial number: [id number]     II:   Flow: 3.6 L/min,    Speed: 8800 RPMs,     PI: 4.6 ,  Power: 5.3 Slaughter,      Primary controller   Back up battery: Patient use: NA, Replace in: NA  Months     Data downloaded: No   Equipment and driveline assessed for damage: Yes     Back up : Serial number: [id number]  Back up battery: Patient use: NA Replace in: NA  Months  Programmed settings identical to the settings on the primary controller :Yes      Education complete: Yes   Have equipment serviced yearly (if applicable):Yes    2). ALARMS  Alarms reported by patient since last pump evaluation: No  Alarms or other finding noted during pump data history and alarm download: Yes, continued frequent PI events. PIs recorded WNL    Action Taken:  Reviewed data with patient: Yes      3). DRESSING CHANGE / DRIVELINE ASSESSMENT  Dressing change completed today: No  Appearance of Driveline site: CDI, no drainage, no redness and no tenderness per Pt's daughter    Driveline stabilization: Method: [employer]  [ Teaching reinforced on need for stabilization of Driveline. ]

## 2017-01-11 NOTE — PROGRESS NOTES
INR 2.06 in clinic today.  No phone call made--home care checked INR yesterday, 1/10/17 and recommendations were given.  Sarah Osborne RN

## 2017-01-11 NOTE — PROGRESS NOTES
[facility] PHYSICIANS HEART VAD CLINIC NOTE  Chief Complaint:  Heart Failure and LVAD follow-up  HPI: 65 year old male with ischemic CM s/p HM II LVAD as DT due to multiple CVAs with residual left sided weakness complicated by recurrent hemolysis and pump thrombus presents today for ongoing evaluation and mangement.  Pt reports that he has been doing well.  He denies any chest pain or pressure, sob/johnson, orthopnea, pnd, palpitations, syncope or oren.  He also denies any pump alarms, hematuria, GI bleeding fever or chills.        Past Medical History   Diagnosis Date     Esophagitis 12/2012     EGD with esophagitis and multiple douenal ulcers     CHF (congestive heart failure), NYHA class IV (H) 10/9/2012     s/p HeartMate II.  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  Normal carotid duplex 3/2014.       Clostridium difficile colitis 12/2012      Dysphagia      PEG tube placed in 2012.  Subsequently passed swallow eval in 3/2014     Gout      Anemia of chronic disease      Baseline Hb 8-9     Myocardial infarction (H) 1998     In [facility] without intervention      Ischemic cardiomyopathy      Nonsenile cataract      BE     Fracture of femoral neck, right, closed 2/3/2015     Presumed pathologic as patient is non-weight-bearing and suffered no trauma.  Family declined operative repair during hospital stay 1/23 - 1/30/15.     Gastric and duodenal angiodysplasia with hemorrhage  6/18/2015       History     Social History     Marital Status: Single     Spouse Name: N/A     Number of Children: N/A     Years of Education: N/A     Social History Main Topics     Smoking status: Former Smoker     Smokeless tobacco: Never Used     Alcohol Use: No     Drug Use: No     Sexual Activity: Not on file     Other Topics Concern     Not on file     Social History Narrative       FAMILY HISTORY:  Family History   Problem Relation Age of Onset     Family History Negative No family hx of      Glaucoma No family hx of      Macular Degeneration No family hx of      CANCER No family hx of      no skin cancer       CURRENT MEDICATIONS:  Current Outpatient Prescriptions   Medication Sig Dispense Refill     losartan (COZAAR) 25 MG tablet Take 1 tablet (25 mg) by mouth daily 45 tablet 3     furosemide (LASIX) 20 MG tablet Take one tablet by mouth once daily as needed for fluid overload. 30 tablet 1     allopurinol (ZYLOPRIM) 100 MG tablet Take 1 tablet (100 mg) by mouth daily 90 tablet 3     atorvastatin (LIPITOR) 10 MG tablet Take 1 tablet (10 mg) by mouth daily 30 tablet 5     order for DME Equipment being ordered: Wheelchair, manual, with elevated leg rests and tilt in space back.  Please fax to [facility]; I called them 11/26/16 and they requested the new order.  Face to face is in my 10/26/16 note. 1 each 0     pantoprazole (PROTONIX) 40 MG enteric coated tablet Take 1 tablet (40 mg) by mouth daily 180 tablet 3     warfarin (COUMADIN) 3 MG tablet 6 mg Sat and Thurs and 4.5 mg the rest of the week. Recommend repeat INR 9/21 per [facility] RN. 180 tablet 3     finasteride (PROSCAR) 5 MG tablet Take 1 tablet (5 mg) by mouth daily 30 tablet 0     tamsulosin (FLOMAX) 0.4 MG 24 hr capsule Take 1 capsule (0.4 mg) by mouth daily 90 capsule 3     senna-docusate (SENOKOT-S;PERICOLACE) 8.6-50 MG per tablet Take 1-2 tablets by mouth 2 times daily as needed for constipation 60 tablet 3     order for DME Equipment being ordered:  Wheelchair, manual. 1 each 0     order for DME Equipment being ordered: Hospital Bed, fully electric. 1 each 0     azelastine (OPTIVAR) 0.05 % SOLN Apply 1 drop to eye 2 times daily 1 Bottle 11     loratadine (CLARITIN) 10 MG tablet Take 1 tablet (10 mg) by mouth daily 90 tablet 3     levETIRAcetam (KEPPRA) 750 MG tablet Take 1 tablet (750 mg) by mouth 2 times daily 180 tablet 3     ferrous sulfate (IRON) 325 (65 FE) MG tablet Take 1 tablet (325 mg) by mouth daily (with breakfast) 90 tablet 3     ascorbic acid (VITAMIN C) 500 MG tablet Take 1 tablet (500 mg) by mouth daily With iron pill 90 tablet 3     lisinopril (PRINIVIL,ZESTRIL) 10 MG tablet Take 1 tablet (10 mg) by mouth daily 90 tablet 3     metoprolol (TOPROL-XL) 25 MG 24 hr tablet Take 1 tablet (25 mg) by mouth every evening 90 tablet 3     metoprolol (TOPROL-XL) 50 MG 24 hr tablet Take 1 tablet (50 mg) by mouth daily 90 tablet 3     blood glucose (NO BRAND SPECIFIED) lancets standard Use to test blood sugar 2 times daily or as directed. 1 Box 11     blood glucose monitoring (NO BRAND SPECIFIED) test strip Use to test blood sugar 2 times daily or as directed. 1 Box 3     simethicone (MYLICON) 80 MG chewable tablet Take 1 tablet (80 mg) by mouth 4 times daily 180 tablet 5     order for DME Equipment being ordered: Reclining manual w/c with bilateral elevating leg rests. 1 each 0     oxyCODONE (ROXICODONE) 5 MG immediate release tablet Take 1 tablet (5 mg) by mouth every 4 hours as needed for moderate to severe pain 90 tablet 0     nystatin (MYCOSTATIN) 317448 UNIT/GM POWD Apply to affected areas of skin in the groin and on the scrotum three times a day as needed. 60 g 3     BD SHARPS CONTAINER HOME MISC 1 each as needed 1 each 1     carboxymethylcellulose (REFRESH PLUS) 0.5 % SOLN Place 1 drop into the right eye 3 times daily as needed for other (eye irritation or discomfort.) 30 mL 3     order for DME Equipment being ordered: Bilateral leg supports for  manual wheelchair. 2 each 0     Thiamine HCl (VITAMIN B-1) 100 MG TABS Take 1 tablet (100 mg) by mouth daily 90 tablet 3     olopatadine (PATANOL) 0.1 % ophthalmic solution Place 1 drop into both eyes 2 times daily 1 Bottle 11     nitroglycerin (NITROSTAT) 0.4 MG SL tablet Place 1 tablet (0.4 mg) under the tongue every 5 minutes as needed for chest pain 30 tablet 1     calcium carbonate-vitamin D 500-400 MG-UNIT TABS tablt Take 1 tablet by mouth 2 times daily (Patient taking differently: Take 1 tablet by mouth daily ) 180 tablet 3     blood glucose monitoring (NO BRAND SPECIFIED) lancets Use to test blood sugars 2 times daily or as directed. 1 Box 3     benzonatate (TESSALON) 100 MG capsule Take 1 capsule (100 mg) by mouth 3 times daily as needed for cough 42 capsule 1     ALBUTEROL INHALER 17GM Inhale 1-2 puffs into the lungs every 4 hours as needed This product no longer available (Patient taking differently: Inhale 1-2 puffs into the lungs every 4 hours as needed 90mcg/Dose, 200 Dose Inhaler) 1 Inhaler 3     ORDER FOR DME Equipment being ordered: Lift chair.    Please see indications and face-to-face encounter details in 2/3/15 Office Visit note. 1 Device 0     acetaminophen (TYLENOL) 325 MG tablet Take 2 tablets (650 mg) by mouth every 6 hours 100 tablet 0     melatonin 1 MG TABS Take 1 tablet (1 mg) by mouth At Bedtime 30 tablet 0     bisacodyl (DULCOLAX) 5 MG EC tablet Take 1 tablet (5 mg) by mouth daily as needed for constipation 20 tablet 1   Not taking lisinopril    ROS:   Constitutional: No fever, chills, or sweats. No weight gain/loss.   ENT: No visual disturbance, ear ache, epistaxis, sore throat.   Allergies/Immunologic: Negative.   Respiratory: No cough, hemoptysis.   Cardiovascular: As per HPI.   GI: No nausea, vomiting, hematemesis, melena, or hematochezia.   : No urinary frequency, dysuria, or hematuria.   Integument: Negative.   Psychiatric: Negative.   Neuro: Negative.   Endocrinology: Negative.    Musculoskeletal: Negative.    EXAM:  BP 68/0 mmHg  Pulse 62  SpO2 98%  Wt Readings from Last 3 Encounters:   10/22/16 77.111 kg (170 lb)   10/07/16 77.111 kg (170 lb)   09/17/16 77.111 kg (170 lb)     General: appears comfortable, alert.  He answers questions appropriately and speech is fairly clear.  Head: normocephalic, atraumatic  Eyes: anicteric sclera, EOMI  Neck: no adenopathy  Orophyarynx: moist mucosa, no lesions, dentition intact  Heart: regular, mechanical hum consistent with pump, no murmur, gallop, rub, estimated JVP < 10 cm  Lungs: clear, no rales or wheezing  Abdomen: soft, non-tender, bowel sounds present, no hepatomegaly  Extremities: no clubbing, cyanosis or edema  Neurological: normal speech and affect, no gross motor deficits    VAD Interrogation today:  VAD interrogation reviewed with VAD coordinator.  Agree with findings.          Assessment and Plan:  1.  Chronic systolic heart failure secondary to ischemic CM. Status post HM II LVAD placement in [place] now DT due to multiple CVAs with residual left sided weakness complicated by recurrent hemolysis and pump thrombus presents today for ongoing evaluation and management.  Focus of care remains on quality of life.  Patient doing well.  Will continue current medical regimen.    Stage D heart failure.     NYHA Class IIIB    LVAD: continue current settings.  Heart Failure medical management:    ACEi/ARB yes, continue losartan    BB yes, continue metoprolol    Aldosterone antagonist no      SCD prophylaxis: ICD    % BiV pacing:   N/A    Fluid status: euvolemic. Avoid dehydration    INR goal: 2.5-3 due to CVAs and pump thrombus.  Coumadin clinic to adjust.    Antiplatelet agents:     NSAID use: no   2.  HTN: controlled  3.  Other comorbid conditions:  -Multiple CVAs with residual and recurrent hemolysis/thrombus:  No significant rehab potential.  Continue coumadin and ASA.  Not candidate for pump exchange.  Continue to focus on symptom  management.      Follow-up  HF NP in 4 months and to see me in 8 months.  Will be happy to see sooner if change in clinical status or new questions/concerns arise.        Evon Lemons MD  Section Head - Advanced Heart Failure, Transplantation and Mechanical Circulatory Support  Co-Director - [facility]  Associate Professor of Medicine, [facility]

## 2017-01-11 NOTE — LETTER
1/11/2017      RE: Sohan Rendon  [address]   [facility] [address]       Dear Colleague,    Thank you for the opportunity to participate in the care of your patient, Sohan Rendon, at the [facility] at [facility]. Please see a copy of my visit note below.    This note is from 11/11/15 VAD clinic visit    [facility] PHYSICIANS HEART VAD CLINIC NOTE  Chief Complaint:  Heart Failure and LVAD follow-up  HPI: 65 year old male with ischemic CM s/p HM II LVAD as DT due to multiple CVAs with residual left sided weakness complicated by recurrent hemolysis and pump thrombus presents today for ongoing evaluation and mangement.  Pt reports that he has been doing well.  He denies any chest pain or pressure, sob/johnson, orthopnea, pnd, palpitations, syncope or oren.  He also denies any pump alarms, hematuria, GI bleeding fever or chills.  He has been eating, drinking and sleeping well.  His daughter reports compliance with medications.  His family also feels he is doing well.  They deny any problems with drive line site.      Past Medical History   Diagnosis Date     Esophagitis 12/2012     EGD with esophagitis and multiple douenal ulcers     CHF (congestive heart failure), NYHA class IV (H) 10/9/2012     s/p HeartMate II.  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  Normal carotid duplex 3/2014.       Clostridium difficile  colitis 12/2012      Dysphagia      PEG tube placed in 2012.  Subsequently passed swallow eval in 3/2014     Gout      Anemia of chronic disease      Baseline Hb 8-9     Myocardial infarction (H) 1998     In [place] without intervention      Ischemic cardiomyopathy      Nonsenile cataract      BE     Fracture of femoral neck, right, closed 2/3/2015     Presumed pathologic as patient is non-weight-bearing and suffered no trauma.  Family declined operative repair during hospital stay 1/23 - 1/30/15.     Gastric and duodenal angiodysplasia with hemorrhage 6/18/2015       History     Social History     Marital Status: Single     Spouse Name: N/A     Number of Children: N/A     Years of Education: N/A     Social History Main Topics     Smoking status: Former Smoker     Smokeless tobacco: Never Used     Alcohol Use: No     Drug Use: No     Sexual Activity: Not on file     Other Topics Concern     Not on file     Social History Narrative       FAMILY HISTORY:  Family History   Problem Relation Age of Onset     Family History Negative No family hx of      Glaucoma No family hx of      Macular Degeneration No family hx of      CANCER No family hx of      no skin cancer       CURRENT MEDICATIONS:  Current Outpatient Prescriptions   Medication Sig Dispense Refill     losartan (COZAAR) 25 MG tablet Take 1 tablet (25 mg) by mouth daily 45 tablet 3     furosemide (LASIX) 20 MG tablet Take one tablet by mouth once daily as needed for fluid overload. 30 tablet 1     allopurinol (ZYLOPRIM) 100 MG tablet Take 1 tablet (100 mg) by mouth daily 90 tablet 3     atorvastatin (LIPITOR) 10 MG tablet Take 1 tablet (10 mg) by mouth daily 30 tablet 5     order for DME Equipment being ordered: Wheelchair, manual, with elevated leg rests and tilt in space back.  Please fax to [facility]; I called them 11/26/16 and they requested the new order.  Face to face is in my 10/26/16 note. 1 each 0     pantoprazole (PROTONIX) 40 MG enteric coated tablet Take  1 tablet (40 mg) by mouth daily 180 tablet 3     warfarin (COUMADIN) 3 MG tablet 6 mg Sat and Thurs and 4.5 mg the rest of the week. Recommend repeat INR 9/21 per [facility] RN. 180 tablet 3     finasteride (PROSCAR) 5 MG tablet Take 1 tablet (5 mg) by mouth daily 30 tablet 0     tamsulosin (FLOMAX) 0.4 MG 24 hr capsule Take 1 capsule (0.4 mg) by mouth daily 90 capsule 3     senna-docusate (SENOKOT-S;PERICOLACE) 8.6-50 MG per tablet Take 1-2 tablets by mouth 2 times daily as needed for constipation 60 tablet 3     order for DME Equipment being ordered: Wheelchair, manual. 1 each 0     order for DME Equipment being ordered: Hospital Bed, fully electric. 1 each 0     azelastine (OPTIVAR) 0.05 % SOLN Apply 1 drop to eye 2 times daily 1 Bottle 11     loratadine (CLARITIN) 10 MG tablet Take 1 tablet (10 mg) by mouth daily 90 tablet 3     levETIRAcetam (KEPPRA) 750 MG tablet Take 1 tablet (750 mg) by mouth 2 times daily 180 tablet 3     ferrous sulfate (IRON) 325 (65 FE) MG tablet Take 1 tablet (325 mg) by mouth daily (with breakfast) 90 tablet 3     ascorbic acid (VITAMIN C) 500 MG tablet Take 1 tablet (500 mg) by mouth daily With iron pill 90 tablet 3     lisinopril (PRINIVIL,ZESTRIL) 10 MG tablet Take 1 tablet (10 mg) by mouth daily 90 tablet 3     metoprolol (TOPROL-XL) 25 MG 24 hr tablet Take 1 tablet (25 mg) by mouth every evening 90 tablet 3     metoprolol (TOPROL-XL) 50 MG 24 hr tablet Take 1 tablet (50 mg) by mouth daily 90 tablet 3     blood glucose (NO BRAND SPECIFIED) lancets standard Use to test blood sugar 2 times daily or as directed. 1 Box 11     blood glucose monitoring (NO BRAND SPECIFIED) test strip Use to test blood sugar 2 times daily or as directed. 1 Box 3     simethicone (MYLICON) 80 MG chewable tablet Take 1 tablet (80 mg) by mouth 4 times daily 180 tablet 5     order for DME Equipment being ordered: Reclining manual w/c with bilateral elevating leg rests. 1 each 0     oxyCODONE (ROXICODONE) 5 MG  immediate release tablet Take 1 tablet (5 mg) by mouth every 4 hours as needed for moderate to severe pain 90 tablet 0     nystatin (MYCOSTATIN) 089572 UNIT/GM POWD Apply to affected areas of skin in the groin and on the scrotum three times a day as needed. 60 g 3     BD SHARPS CONTAINER HOME MISC 1 each as needed 1 each 1     carboxymethylcellulose (REFRESH PLUS) 0.5 % SOLN Place 1 drop into the right eye 3 times daily as needed for other (eye irritation or discomfort.) 30 mL 3     order for DME Equipment being ordered: Bilateral leg supports for manual wheelchair. 2 each 0     Thiamine HCl (VITAMIN B-1) 100 MG TABS Take 1 tablet (100 mg) by mouth daily 90 tablet 3     olopatadine (PATANOL) 0.1 % ophthalmic solution Place 1 drop into both eyes 2 times daily 1 Bottle 11     nitroglycerin (NITROSTAT) 0.4 MG SL tablet Place 1 tablet (0.4 mg) under the tongue every 5 minutes as needed for chest pain 30 tablet 1     calcium carbonate-vitamin D 500-400 MG-UNIT TABS tablt Take 1 tablet by mouth 2 times daily (Patient taking differently: Take 1 tablet by mouth daily ) 180 tablet 3     blood glucose monitoring (NO BRAND SPECIFIED) lancets Use to test blood sugars 2 times daily or as directed. 1 Box 3     benzonatate (TESSALON) 100 MG capsule Take 1 capsule (100 mg) by mouth 3 times daily as needed for cough 42 capsule 1     ALBUTEROL INHALER 17GM Inhale 1-2 puffs into the lungs every 4 hours as needed This product no longer available (Patient taking differently: Inhale 1-2 puffs into the lungs every 4 hours as needed 90mcg/Dose, 200 Dose Inhaler) 1 Inhaler 3     ORDER FOR DME Equipment being ordered: Lift chair.    Please see indications and face-to-face encounter details in 2/3/15 Office Visit note. 1 Device 0     acetaminophen (TYLENOL) 325 MG tablet Take 2 tablets (650 mg) by mouth every 6 hours 100 tablet 0     melatonin 1 MG TABS Take 1 tablet (1 mg) by mouth At Bedtime 30 tablet 0     bisacodyl (DULCOLAX) 5 MG EC  tablet Take 1 tablet (5 mg) by mouth daily as needed for constipation 20 tablet 1       ROS:   Constitutional: No fever, chills, or sweats. No weight gain/loss.   ENT: No visual disturbance, ear ache, epistaxis, sore throat.   Allergies/Immunologic: Negative.   Respiratory: No cough, hemoptysis.   Cardiovascular: As per HPI.   GI: No nausea, vomiting, hematemesis, melena, or hematochezia.   : No urinary frequency, dysuria, or hematuria.   Integument: Negative.   Psychiatric: Negative.   Neuro: Negative.   Endocrinology: Negative.   Musculoskeletal: Negative.    EXAM:  BP 68/0 mmHg  Pulse 62  SpO2 98%  Wt Readings from Last 3 Encounters:   10/22/16 77.111 kg (170 lb)   10/07/16 77.111 kg (170 lb)   09/17/16 77.111 kg (170 lb)     General: appears comfortable, alert.  He answers questions appropriately and speech is fairly clear.  Head: normocephalic, atraumatic  Eyes: anicteric sclera, EOMI  Neck: no adenopathy  Orophyarynx: moist mucosa, no lesions, dentition intact  Heart: regular, mechanical hum consistent with pump, no murmur, gallop, rub, estimated JVP < 10 cm  Lungs: clear, no rales or wheezing  Abdomen: soft, non-tender, bowel sounds present, no hepatomegaly  Extremities: no clubbing, cyanosis or edema  Neurological: normal speech and affect, no gross motor deficits    VAD Interrogation today:  VAD interrogation reviewed with VAD coordinator.  Agree with findings.        ICD interrogation:  Today his intrinsic rhythm is 65 bpm. Normal ICD function. No arrhythmias recorded. OptiVol thoracic impedance slightly above baseline.   85%.      Assessment and Plan:  1.  Chronic systolic heart failure secondary to ischemic CM. Status post HM II LVAD placement in [place] now DT due to multiple CVAs with residual left sided weakness complicated by recurrent hemolysis and pump thrombus presents today for ongoing evaluation and management.  Focus of care remains on quality of life.  Patient doing well.  Will  continue current medical regimen.    Stage D heart failure.     NYHA Class IIIB    LVAD: continue current settings.  Heart Failure medical management:    ACEi/ARB no, had not tolerated    BB yes    Aldosterone antagonist no      SCD prophylaxis: ICD    % BiV pacing:   N/A    Fluid status: euvolemic. Avoid dehydration    INR goal: 2.5-3 due to CVAs and pump thrombus.  Coumadin clinic to adjust.    Antiplatelet agents:     NSAID use: no   2.  HTN: controlled  3.  Other comorbid conditions:  -Multiple CVAs with residual and recurrent hemolysis/thrombus:  No significant rehab potential.  Continue coumadin and ASA.  Not candidate for pump exchange.  Continue to focus on symptom management.      Follow-up 4 months, sooner if needed.      Evon Lemons MD  Co-Director [facility]  Associate Professor of Medicine, Advanced Heart Failure and Transplant, Cardiology Division  [facility]

## 2017-01-11 NOTE — MR AVS SNAPSHOT
After Visit Summary   1/11/2017    Sohan Rendon    MRN: [id number]           Patient Information     Date Of Birth          1950        Visit Information        Provider Department      1/11/2017 10:00 AM Jordi Lopez; Evon Lemons MD [facility]        Today's Diagnoses     Chronic systolic congestive heart failure (H)    -  1       Care Instructions    No medication changes or pump changes.     Follow up:    Please get a BMP drawn with your next INR.    See a HF NP in 4 months and Dr. Lemons in 8 months.        Follow-ups after your visit        Follow-up notes from your care team     Return in about 4 months (around 5/11/2017) for LVAD follow up .      Your next 10 appointments already scheduled     Feb 14, 2017  2:00 PM   Return Visit with Thomas Dill MD   [facility] ([facility])    [address]  [address]  [facility] [address]   [phone]            Mar 14, 2017  2:00 PM   [facility]    [address]  [address]  [facility] [address]   [phone]            May 11, 2017 11:30 AM   Lab with  LAB   [facility] ([facility])    [address]  1st Floor  [facility] [id number] [phone]            May 11, 2017 12:00 PM   (Arrive by 11:45 AM)   Ventricular Assist Device with Maureen Grant NP   [facility] ([facility])    [address]  3rd Floor  [facility] [id number] [phone]              Who to contact     If you have questions or need follow up information about today's clinic visit or your schedule please contact [facility] directly at [phone].  Normal or non-critical lab and imaging results will be communicated to you by MyChart, letter or phone within 4 business days after the clinic has received the results. If  "you do not hear from us within 7 days, please contact the clinic through [employer] or phone. If you have a critical or abnormal lab result, we will notify you by phone as soon as possible.  Submit refill requests through [employer] or call your pharmacy and they will forward the refill request to us. Please allow 3 business days for your refill to be completed.          Additional Information About Your Visit        [employer] Information     [employer] lets you send messages to your doctor, view your test results, renew your prescriptions, schedule appointments and more. To sign up, go to [email or web address] . Click on \"Log in\" on the left side of the screen, which will take you to the Welcome page. Then click on \"Sign up Now\" on the right side of the page.     You will be asked to enter the access code listed below, as well as some personal information. Please follow the directions to create your username and password.     Your access code is: [email or web address]  Expires: 2017  6:30 AM     Your access code will  in 90 days. If you need help or a new code, please call your [place] clinic or [phone].        Care EveryWhere ID     This is your Care EveryWhere ID. This could be used by other organizations to access your [place] medical records  [id number]        Your Vitals Were     Pulse Pulse Oximetry                62 98%           Blood Pressure from Last 3 Encounters:   17 68/0   16 113/80   16 94/62    Weight from Last 3 Encounters:   10/22/16 77.111 kg (170 lb)   10/07/16 77.111 kg (170 lb)   16 77.111 kg (170 lb)              We Performed the Following     (36881) INTERROGATE VENT ASSIST DEVICE, IN PERSON, ARAMIS SAWYER ANALYSIS PARAMETERS          Today's Medication Changes          These changes are accurate as of: 17 11:07 AM.  If you have any questions, ask your nurse or doctor.               These medicines have changed or have updated prescriptions.        Dose/Directions    " ALBUTEROL INHALER 17GM   This may have changed:  additional instructions   Used for:  Acute bronchitis        Dose:  1-2 puff   Inhale 1-2 puffs into the lungs every 4 hours as needed This product no longer available   Quantity:  1 Inhaler   Refills:  3       calcium carbonate-vitamin D 500-400 MG-UNIT Tabs per tablet   This may have changed:  when to take this   Used for:  Fracture of femoral neck, right, closed, initial encounter        Dose:  1 tablet   Take 1 tablet by mouth 2 times daily   Quantity:  180 tablet   Refills:  3                Primary Care Provider Office Phone # Fax #    Thomas Dill [phone]       [facility] [address] [place] [address] [address] [place] [id number]        Thank you!     Thank you for choosing [facility]  for your care. Our goal is always to provide you with excellent care. Hearing back from our patients is one way we can continue to improve our services. Please take a few minutes to complete the written survey that you may receive in the mail after your visit with us. Thank you!             Your Updated Medication List - Protect others around you: Learn how to safely use, store and throw away your medicines at www.disposemymeds.org.          This list is accurate as of: 1/11/17 11:07 AM.  Always use your most recent med list.                   Brand Name Dispense Instructions for use    acetaminophen 325 MG tablet    TYLENOL    100 tablet    Take 2 tablets (650 mg) by mouth every 6 hours       ALBUTEROL INHALER 17GM     1 Inhaler    Inhale 1-2 puffs into the lungs every 4 hours as needed This product no longer available       allopurinol 100 MG tablet    ZYLOPRIM    90 tablet    Take 1 tablet (100 mg) by mouth daily       ascorbic acid 500 MG tablet    VITAMIN C    90 tablet    Take 1 tablet (500 mg) by mouth daily With iron pill       atorvastatin 10 MG tablet    LIPITOR    30 tablet    Take 1 tablet (10 mg) by mouth daily       azelastine  0.05 % Soln ophthalmic solution    OPTIVAR    1 Bottle    Apply 1 drop to eye 2 times daily       BD SHARPS CONTAINER HOME Misc     1 each    1 each as needed       benzonatate 100 MG capsule    TESSALON    42 capsule    Take 1 capsule (100 mg) by mouth 3 times daily as needed for cough       bisacodyl 5 MG EC tablet    DULCOLAX    20 tablet    Take 1 tablet (5 mg) by mouth daily as needed for constipation       blood glucose lancets standard    no brand specified    1 Box    Use to test blood sugar 2 times daily or as directed.       blood glucose monitoring lancets     1 Box    Use to test blood sugars 2 times daily or as directed.       blood glucose monitoring test strip    no brand specified    1 Box    Use to test blood sugar 2 times daily or as directed.       calcium carbonate-vitamin D 500-400 MG-UNIT Tabs per tablet     180 tablet    Take 1 tablet by mouth 2 times daily       carboxymethylcellulose 0.5 % Soln ophthalmic solution    REFRESH PLUS    30 mL    Place 1 drop into the right eye 3 times daily as needed for other (eye irritation or discomfort.)       ferrous sulfate 325 (65 FE) MG tablet    IRON    90 tablet    Take 1 tablet (325 mg) by mouth daily (with breakfast)       finasteride 5 MG tablet    PROSCAR    30 tablet    Take 1 tablet (5 mg) by mouth daily       furosemide 20 MG tablet    LASIX    30 tablet    Take one tablet by mouth once daily as needed for fluid overload.       levETIRAcetam 750 MG tablet    KEPPRA    180 tablet    Take 1 tablet (750 mg) by mouth 2 times daily       lisinopril 10 MG tablet    PRINIVIL/ZESTRIL    90 tablet    Take 1 tablet (10 mg) by mouth daily       loratadine 10 MG tablet    CLARITIN    90 tablet    Take 1 tablet (10 mg) by mouth daily       losartan 25 MG tablet    COZAAR    45 tablet    Take 1 tablet (25 mg) by mouth daily       melatonin 1 MG Tabs tablet     30 tablet    Take 1 tablet (1 mg) by mouth At Bedtime       * metoprolol 25 MG 24 hr tablet     TOPROL-XL    90 tablet    Take 1 tablet (25 mg) by mouth every evening       * metoprolol 50 MG 24 hr tablet    TOPROL-XL    90 tablet    Take 1 tablet (50 mg) by mouth daily       nitroglycerin 0.4 MG sublingual tablet    NITROSTAT    30 tablet    Place 1 tablet (0.4 mg) under the tongue every 5 minutes as needed for chest pain       nystatin 201071 UNIT/GM Powd    MYCOSTATIN    60 g    Apply to affected areas of skin in the groin and on the scrotum three times a day as needed.       olopatadine 0.1 % ophthalmic solution    PATANOL    1 Bottle    Place 1 drop into both eyes 2 times daily       * order for DME     1 Device    Equipment being ordered: Lift chair.  Please see indications and face-to-face encounter details in 2/3/15 Office Visit note.       * order for DME     2 each    Equipment being ordered: Bilateral leg supports for manual wheelchair.       * order for DME     1 each    Equipment being ordered: Reclining manual w/c with bilateral elevating leg rests.       * order for DME     1 each    Equipment being ordered: Hospital Bed, fully electric.       * order for DME     1 each    Equipment being ordered: Wheelchair, manual.       * order for DME     1 each    Equipment being ordered: Wheelchair, manual, with elevated leg rests and tilt in space back.  Please fax to [employer] [facility]; I called them 11/26/16 and they requested the new order.  Face to face is in my 10/26/16 note.       oxyCODONE 5 MG IR tablet    ROXICODONE    90 tablet    Take 1 tablet (5 mg) by mouth every 4 hours as needed for moderate to severe pain       pantoprazole 40 MG EC tablet    PROTONIX    180 tablet    Take 1 tablet (40 mg) by mouth daily       senna-docusate 8.6-50 MG per tablet    SENOKOT-S;PERICOLACE    60 tablet    Take 1-2 tablets by mouth 2 times daily as needed for constipation       simethicone 80 MG chewable tablet    MYLICON    180 tablet    Take 1 tablet (80 mg) by mouth 4 times daily       tamsulosin 0.4 MG capsule     FLOMAX    90 capsule    Take 1 capsule (0.4 mg) by mouth daily       Vitamin B-1 100 MG Tabs     90 tablet    Take 1 tablet (100 mg) by mouth daily       warfarin 3 MG tablet    COUMADIN    180 tablet    6 mg Sat and Thurs and 4.5 mg the rest of the week. Recommend repeat INR 9/21 per [facility] RN.       * Notice:  This list has 8 medication(s) that are the same as other medications prescribed for you. Read the directions carefully, and ask your doctor or other care provider to review them with you.

## 2017-01-11 NOTE — PATIENT INSTRUCTIONS
No medication changes or pump changes.     Follow up:    Please get a BMP drawn with your next INR.    See a HF NP in 4 months and Dr. Lemons in 8 months.

## 2017-01-16 DIAGNOSIS — I50.22 CHRONIC SYSTOLIC CONGESTIVE HEART FAILURE (H): Primary | ICD-10-CM

## 2017-01-16 LAB
ANION GAP SERPL CALCULATED.3IONS-SCNC: 12 MMOL/L (ref 3–14)
BUN SERPL-MCNC: 22 MG/DL (ref 7–30)
CALCIUM SERPL-MCNC: 8.6 MG/DL (ref 8.5–10.1)
CHLORIDE SERPL-SCNC: 110 MMOL/L (ref 94–109)
CO2 SERPL-SCNC: 17 MMOL/L (ref 20–32)
CREAT SERPL-MCNC: 1.66 MG/DL (ref 0.66–1.25)
GFR SERPL CREATININE-BSD FRML MDRD: 42 ML/MIN/1.7M2
GLUCOSE SERPL-MCNC: 123 MG/DL (ref 70–99)
POTASSIUM SERPL-SCNC: 4.2 MMOL/L (ref 3.4–5.3)
SODIUM SERPL-SCNC: 139 MMOL/L (ref 133–144)

## 2017-01-16 PROCEDURE — 80048 BASIC METABOLIC PNL TOTAL CA: CPT | Performed by: INTERNAL MEDICINE

## 2017-01-17 ENCOUNTER — CARE COORDINATION (OUTPATIENT)
Dept: GERIATRIC MEDICINE | Facility: CLINIC | Age: 67
End: 2017-01-17

## 2017-01-17 NOTE — PROGRESS NOTES
Client enrolled in [facility] effective 01/01/17. Client is on a CADI waiver and has a CADI CM. This CM met with client, two of his daughters and [facility]. A [place] assessment was done at the visit.  Member resides: Apartment handicap accessible  Member currently receiving the following services: PCA services, skilled nurse visits and medical equipment.  See EMR for a list of client's diagnoses and medications.   Medication management: Medications reviewed:Yes. Medication management: Family sets up medication. Medication understanding:Patient has understanding of regimen and is adherent Yes. MTM offered.  Member Mood/behavior-PHQ9 score: Not done Any needs to f/u with PCP on PHQ9 score.   Plan of Care: Continue the services client has in place per the CADI waiver.   Follow-Up Plan: Member informed of future contact, plan to f/u with member with a 6 month telephone assessment.  Contact information shared with member and family, encouraged member to call with any questions or concerns prior to this.  See [facility] for further detailed information,  Nena Salazar RN  [facility]   [phone]

## 2017-01-23 ENCOUNTER — ANTICOAGULATION THERAPY VISIT (OUTPATIENT)
Dept: ANTICOAGULATION | Facility: CLINIC | Age: 67
End: 2017-01-23

## 2017-01-23 DIAGNOSIS — Z95.811 LVAD (LEFT VENTRICULAR ASSIST DEVICE) PRESENT (H): ICD-10-CM

## 2017-01-23 DIAGNOSIS — Z79.01 LONG-TERM (CURRENT) USE OF ANTICOAGULANTS: Primary | ICD-10-CM

## 2017-01-23 LAB — INR PPP: 2.3

## 2017-01-23 NOTE — PROGRESS NOTES
ANTICOAGULATION FOLLOW-UP CLINIC VISIT    Patient Name:  Sohan Rendon  Date:  1/23/2017  Contact Type:  Telephone    SUBJECTIVE:     Patient Findings     Positives No Problem Findings           OBJECTIVE    INR   Date Value Ref Range Status   01/23/2017 2.3  Final     CHROMOGENIC FACTOR 10   Date Value Ref Range Status   08/10/2014 24* 70 - 130 % Final     Comment:     Therapeutic Range:  A Chromogenic Factor 10 level of approximately 20-40%   inversely correlates with an INR of 2-3 for patients receiving Warfarin.   Chromogenic Factor 10 levels below 20% indicate an INR greater than 3 and   levels above 40% indicate an INR less than 2.       FACTOR 2 ASSAY   Date Value Ref Range Status   07/21/2014 25* 60 - 140 % Final     Comment:     Analyte Specific Reagents (ASRs) are used in many laboratory tests necessary   for   standard medical care and generally do not require FDA approval.  This test   was   developed and its performance characteristics determined by [facility].  It has not been cleared or approved by   the US Food and Drug Administration.         ASSESSMENT / PLAN  INR assessment THER    Recheck INR In: 2 WEEKS    INR Location [facility]      Anticoagulation Summary as of 1/23/2017     INR goal    Prior goal 1.8-2.5   Selected INR 2.3 (1/23/2017)   Maintenance plan 3 mg (3 mg x 1) on Mon, Thu; 4.5 mg (3 mg x 1.5) all other days   Full instructions 3 mg on Mon, Thu; 4.5 mg all other days   Weekly total 28.5 mg   Plan last modified Sarah Gill RN (12/12/2016)   Next INR check 2/6/2017   Priority INR   Target end date Indefinite    Indications   LVAD (left ventricular assist device) present [Z95.811]  Long-term (current) use of anticoagulants [Z79.01] [Z79.01]         Anticoagulation Episode Summary     INR check location     Preferred lab     Send INR reminders to [facility]    Comments Goal Range is 1.8-2.3  [facility] comes out to  draw INR  Daughter Almaz Ph [phone]      Anticoagulation Care Providers     Provider Role Specialty Phone number    Evon Lemons MD Responsible Cardiology [phone]            See the Encounter Report to view Anticoagulation Flowsheet and Dosing Calendar (Go to Encounters tab in chart review, and find the Anticoagulation Therapy Visit)  Spoke with Sofya [facility] .    Dafne Sanders RN

## 2017-01-27 ENCOUNTER — TELEPHONE (OUTPATIENT)
Dept: FAMILY MEDICINE | Facility: CLINIC | Age: 67
End: 2017-01-27

## 2017-01-27 NOTE — TELEPHONE ENCOUNTER
Spoke with Almaz, pt's dtr. She said his urine as a foul odor and had burning with urination yesterday.  Per Almaz, his urine looks clear and he has not had dysuria today.    The patient does not have a fever or chills.    Almaz said the pt's abdomen looks like he is retaining fluid and feels a little firm to her. She said they usually give him Lasix for this, so she will discuss with her sister, who is main caregiver.    Almaz said pt is having regular bowel movements and yesterday, last BM was yesterday, which she described as loose and greenish in color.    Advised to monitor pt and call FNA over weekend if concerned otherwise call us on Monday if any new symptoms and we could see if Dr. Dill would like to order any labs.  Alamz voiced understanding and agreed with the plan.

## 2017-01-27 NOTE — TELEPHONE ENCOUNTER
Pt's dtr requesting an abx stating her father has foul smelling urine and it is very dark.    Mary Jimenez MA

## 2017-01-31 DIAGNOSIS — I50.22 CHRONIC SYSTOLIC CONGESTIVE HEART FAILURE (H): Primary | ICD-10-CM

## 2017-02-06 ENCOUNTER — ANTICOAGULATION THERAPY VISIT (OUTPATIENT)
Dept: ANTICOAGULATION | Facility: CLINIC | Age: 67
End: 2017-02-06

## 2017-02-06 ENCOUNTER — CARE COORDINATION (OUTPATIENT)
Dept: GERIATRIC MEDICINE | Facility: CLINIC | Age: 67
End: 2017-02-06

## 2017-02-06 ENCOUNTER — TELEPHONE (OUTPATIENT)
Dept: FAMILY MEDICINE | Facility: CLINIC | Age: 67
End: 2017-02-06

## 2017-02-06 DIAGNOSIS — Z95.811 LVAD (LEFT VENTRICULAR ASSIST DEVICE) PRESENT (H): ICD-10-CM

## 2017-02-06 DIAGNOSIS — Z79.01 LONG-TERM (CURRENT) USE OF ANTICOAGULANTS: Primary | ICD-10-CM

## 2017-02-06 LAB — INR PPP: 2.2

## 2017-02-06 NOTE — TELEPHONE ENCOUNTER
Dtr Almaz reports pt has been experiencing a sore throat, sneezing and coughing up mucus for the past 1.5-2 wks. Dtr is wondering if there is anything to give the pt to relieve his symptoms.    Mary Jimenez MA

## 2017-02-06 NOTE — PROGRESS NOTES
ANTICOAGULATION FOLLOW-UP CLINIC VISIT    Patient Name:  Sohan Rendon  Date:  2/6/2017  Contact Type:  Telephone    SUBJECTIVE:     Patient Findings     Positives No Problem Findings           OBJECTIVE    INR   Date Value Ref Range Status   02/06/2017 2.20  Final     Comment:     [facility]      CHROMOGENIC FACTOR 10   Date Value Ref Range Status   08/10/2014 24* 70 - 130 % Final     Comment:     Therapeutic Range:  A Chromogenic Factor 10 level of approximately 20-40%   inversely correlates with an INR of 2-3 for patients receiving Warfarin.   Chromogenic Factor 10 levels below 20% indicate an INR greater than 3 and   levels above 40% indicate an INR less than 2.       FACTOR 2 ASSAY   Date Value Ref Range Status   07/21/2014 25* 60 - 140 % Final     Comment:     Analyte Specific Reagents (ASRs) are used in many laboratory tests necessary   for   standard medical care and generally do not require FDA approval.  This test   was   developed and its performance characteristics determined by [facility].  It has not been cleared or approved by   the US Food and Drug Administration.         ASSESSMENT / PLAN  INR assessment THER    Recheck INR In: 2 WEEKS    INR Location [facility]      Anticoagulation Summary as of 2/6/2017     INR goal    Prior goal 1.8-2.5   Selected INR 2.20 (2/6/2017)   Maintenance plan 3 mg (3 mg x 1) on Mon, Thu; 4.5 mg (3 mg x 1.5) all other days   Full instructions 3 mg on Mon, Thu; 4.5 mg all other days   Weekly total 28.5 mg   Plan last modified Sarah Gill RN (12/12/2016)   Next INR check 2/20/2017   Priority INR   Target end date Indefinite    Indications   LVAD (left ventricular assist device) present [Z95.811]  Long-term (current) use of anticoagulants [Z79.01] [Z79.01]         Anticoagulation Episode Summary     INR check location     Preferred lab     Send INR reminders to [facility]    Comments Goal Range is  1.8-2.3  [facility] comes out to draw INR  Daughter Almaz Ph [phone]      Anticoagulation Care Providers     Provider Role Specialty Phone number    Evon Lemons MD Responsible Cardiology [phone]            See the Encounter Report to view Anticoagulation Flowsheet and Dosing Calendar (Go to Encounters tab in chart review, and find the Anticoagulation Therapy Visit)    Spoke with INO Jackson RN

## 2017-02-06 NOTE — PROGRESS NOTES
PCA agency change per CM.  Faxed most recent PCA assessment to PCA agency. Emailed [facility] PCA assessment and agency info.  Afia Munroe  Case Management Specialist  [facility]  [phone]

## 2017-02-06 NOTE — TELEPHONE ENCOUNTER
All of these symptoms are old, including the sore throat.  He stopped using nasal steroids, which were effective, when he had epistaxis.  Daughters have asked me to use several different oral antihistamines, currently on loratadine, though none have seemed to make any difference.  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  He does not have lung disease, so nebulized bronchodilators, which he uses very infrequently, have been ineffective.  We stopped ACE-I in favor of losartan several months ago, which improved the cough at least partially.  Daughters requested ENT referral last fall, which I made (at least once, maybe twice), but family never took patient for appointment.    All I can think of that hasn't been tried would be the use of an OTC Neti Pot, which may be very effective, but is not convenient.  If they decide to use it, please make sure they buffer the solution given his history of epistaxis.

## 2017-02-06 NOTE — TELEPHONE ENCOUNTER
He has loratadine and an albuterol inhaler on his med list.  Will ask Dr. Dill if there is anything appropriate for this pt to take for his cold symptoms. Is guaifenesin appropriate for this pt?

## 2017-02-07 ENCOUNTER — HOSPITAL ENCOUNTER (EMERGENCY)
Facility: CLINIC | Age: 67
Discharge: HOME | End: 2017-02-08
Attending: EMERGENCY MEDICINE | Admitting: EMERGENCY MEDICINE
Payer: MEDICARE

## 2017-02-07 ENCOUNTER — MEDICAL CORRESPONDENCE (OUTPATIENT)
Dept: HEALTH INFORMATION MANAGEMENT | Facility: CLINIC | Age: 67
End: 2017-02-07

## 2017-02-07 ENCOUNTER — APPOINTMENT (OUTPATIENT)
Dept: GENERAL RADIOLOGY | Facility: CLINIC | Age: 67
End: 2017-02-07
Attending: EMERGENCY MEDICINE
Payer: MEDICARE

## 2017-02-07 DIAGNOSIS — J06.9 UPPER RESPIRATORY TRACT INFECTION, UNSPECIFIED TYPE: ICD-10-CM

## 2017-02-07 DIAGNOSIS — I50.32 CHRONIC DIASTOLIC CONGESTIVE HEART FAILURE (H): Primary | ICD-10-CM

## 2017-02-07 PROCEDURE — 99284 EMERGENCY DEPT VISIT MOD MDM: CPT | Mod: 25 | Performed by: EMERGENCY MEDICINE

## 2017-02-07 PROCEDURE — G0180 MD CERTIFICATION HHA PATIENT: HCPCS

## 2017-02-07 PROCEDURE — 71020 XR CHEST 2 VW: CPT

## 2017-02-07 PROCEDURE — 36415 COLL VENOUS BLD VENIPUNCTURE: CPT | Performed by: EMERGENCY MEDICINE

## 2017-02-07 PROCEDURE — 99284 EMERGENCY DEPT VISIT MOD MDM: CPT | Mod: Z6 | Performed by: EMERGENCY MEDICINE

## 2017-02-07 RX ORDER — ALBUTEROL SULFATE 90 UG/1
2 AEROSOL, METERED RESPIRATORY (INHALATION) EVERY 6 HOURS PRN
Qty: 3 INHALER | Refills: 1 | Status: SHIPPED | OUTPATIENT
Start: 2017-02-07 | End: 2017-02-07

## 2017-02-07 ASSESSMENT — ENCOUNTER SYMPTOMS
RHINORRHEA: 1
FEVER: 0
CONFUSION: 0
EYE REDNESS: 0
COUGH: 1
NECK STIFFNESS: 0
VOICE CHANGE: 1
HEADACHES: 1
SLEEP DISTURBANCE: 1
ARTHRALGIAS: 0
COLOR CHANGE: 0
SHORTNESS OF BREATH: 0
DIFFICULTY URINATING: 0
ABDOMINAL PAIN: 0

## 2017-02-07 NOTE — TELEPHONE ENCOUNTER
Pending Prescriptions:                       Disp   Refills    albuterol (PROAIR HFA/PROVENTIL HFA/DEBBIE*3 Inha*1            Sig: Inhale 2 puffs into the lungs every 6 hours as           needed for shortness of breath / dyspnea or           wheezing

## 2017-02-07 NOTE — ED AVS SNAPSHOT
[facility], Emergency Department    [address] [address]    Phone:  [phone]                                       Sohan Rendon   MRN: [id number]    Department:  [facility], Emergency Department   Date of Visit:  2/7/2017           Patient Information     Date Of Birth          1950        Your diagnoses for this visit were:     Upper respiratory tract infection, unspecified type        You were seen by Garett Jurado MD.        Discharge Instructions       Please make an appointment to follow up with Your Primary Care Provider if not improving.    Diphenhydramine as directed, if needed for cough.    Return to the emergency department for any worsening symptoms or other problems.      Future Appointments        Provider Department Dept Phone Center    2/14/2017 2:00 PM Thomas Dill MD [facility] [phone] [facility]    3/14/2017 2:00 PM Thomas Dill MD [facility] Care [facility] [phone] [facility]    5/11/2017 10:00 AM [facility] IMAGING CVC ECHO ROOM 2 [facility] Echo [phone] [facility]    5/11/2017 11:00 AM Lab [facility] Lab [phone] [facility]    5/11/2017 11:30 AM UC CV DEVICE 1              se [facility] [phone] [facility]    5/11/2017 12:00 PM Maureen Grant NP [facility] [phone] [facility]      24 Hour Appointment Hotline       To make an appointment at any [facility], call [phone] ([phone]). If you don't have a family doctor or clinic, we will help you find one. [facility] are conveniently located to serve the needs of you and your family.             Review of your medicines      START taking        Dose / Directions Last dose taken    diphenhydrAMINE HCl 12.5 MG/5ML Syrp   Dose:  50 mg   Quantity:  560 mL        Take 50 mg by mouth nightly as needed (Cough)   Refills:  0          Our records show that you are taking the medicines listed below. If these are incorrect, please call your  family doctor or clinic.        Dose / Directions Last dose taken    acetaminophen 325 MG tablet   Commonly known as:  TYLENOL   Dose:  650 mg   Quantity:  100 tablet        Take 2 tablets (650 mg) by mouth every 6 hours   Refills:  0        allopurinol 100 MG tablet   Commonly known as:  ZYLOPRIM   Dose:  100 mg   Quantity:  90 tablet        Take 1 tablet (100 mg) by mouth daily   Refills:  3        ascorbic acid 500 MG tablet   Commonly known as:  VITAMIN C   Dose:  500 mg   Quantity:  90 tablet        Take 1 tablet (500 mg) by mouth daily With iron pill   Refills:  3        atorvastatin 10 MG tablet   Commonly known as:  LIPITOR   Dose:  10 mg   Quantity:  30 tablet        Take 1 tablet (10 mg) by mouth daily   Refills:  5        azelastine 0.05 % Soln ophthalmic solution   Commonly known as:  OPTIVAR   Dose:  1 drop   Quantity:  1 Bottle        Apply 1 drop to eye 2 times daily   Refills:  11        BD SHARPS CONTAINER HOME Misc   Dose:  1 each   Quantity:  1 each        1 each as needed   Refills:  1        benzonatate 100 MG capsule   Commonly known as:  TESSALON   Dose:  100 mg   Quantity:  42 capsule        Take 1 capsule (100 mg) by mouth 3 times daily as needed for cough   Refills:  1        bisacodyl 5 MG EC tablet   Commonly known as:  DULCOLAX   Dose:  5 mg   Quantity:  20 tablet        Take 1 tablet (5 mg) by mouth daily as needed for constipation   Refills:  1        blood glucose lancets standard   Commonly known as:  no brand specified   Quantity:  1 Box        Use to test blood sugar 2 times daily or as directed.   Refills:  11        blood glucose monitoring lancets   Quantity:  1 Box        Use to test blood sugars 2 times daily or as directed.   Refills:  3        blood glucose monitoring test strip   Commonly known as:  no brand specified   Quantity:  1 Box        Use to test blood sugar 2 times daily or as directed.   Refills:  3        calcium carbonate-vitamin D 500-400 MG-UNIT Tabs per  tablet   Dose:  1 tablet   Quantity:  180 tablet        Take 1 tablet by mouth 2 times daily   Refills:  3        carboxymethylcellulose 0.5 % Soln ophthalmic solution   Commonly known as:  REFRESH PLUS   Dose:  1 drop   Quantity:  30 mL        Place 1 drop into the right eye 3 times daily as needed for other (eye irritation or discomfort.)   Refills:  3        ferrous sulfate 325 (65 FE) MG tablet   Commonly known as:  IRON   Dose:  325 mg   Quantity:  90 tablet        Take 1 tablet (325 mg) by mouth daily (with breakfast)   Refills:  3        finasteride 5 MG tablet   Commonly known as:  PROSCAR   Dose:  5 mg   Quantity:  30 tablet        Take 1 tablet (5 mg) by mouth daily   Refills:  0        furosemide 20 MG tablet   Commonly known as:  LASIX   Quantity:  30 tablet        Take one tablet by mouth once daily as needed for fluid overload.   Refills:  1        levETIRAcetam 750 MG tablet   Commonly known as:  KEPPRA   Dose:  750 mg   Quantity:  180 tablet        Take 1 tablet (750 mg) by mouth 2 times daily   Refills:  3        loratadine 10 MG tablet   Commonly known as:  CLARITIN   Dose:  10 mg   Quantity:  90 tablet        Take 1 tablet (10 mg) by mouth daily   Refills:  3        losartan 25 MG tablet   Commonly known as:  COZAAR   Dose:  25 mg   Quantity:  45 tablet        Take 1 tablet (25 mg) by mouth daily   Refills:  3        melatonin 1 MG Tabs tablet   Dose:  1 mg   Quantity:  30 tablet        Take 1 tablet (1 mg) by mouth At Bedtime   Refills:  0        * metoprolol 25 MG 24 hr tablet   Commonly known as:  TOPROL-XL   Dose:  25 mg   Quantity:  90 tablet        Take 1 tablet (25 mg) by mouth every evening   Refills:  3        * metoprolol 50 MG 24 hr tablet   Commonly known as:  TOPROL-XL   Dose:  50 mg   Quantity:  90 tablet        Take 1 tablet (50 mg) by mouth daily   Refills:  3        nitroglycerin 0.4 MG sublingual tablet   Commonly known as:  NITROSTAT   Dose:  0.4 mg   Quantity:  30 tablet         Place 1 tablet (0.4 mg) under the tongue every 5 minutes as needed for chest pain   Refills:  1        nystatin 249092 UNIT/GM Powd   Commonly known as:  MYCOSTATIN   Quantity:  60 g        Apply to affected areas of skin in the groin and on the scrotum three times a day as needed.   Refills:  3        olopatadine 0.1 % ophthalmic solution   Commonly known as:  PATANOL   Dose:  1 drop   Quantity:  1 Bottle        Place 1 drop into both eyes 2 times daily   Refills:  11        * order for DME   Quantity:  1 Device        Equipment being ordered: Lift chair.  Please see indications and face-to-face encounter details in 2/3/15 Office Visit note.   Refills:  0        * order for DME   Quantity:  2 each        Equipment being ordered: Bilateral leg supports for manual wheelchair.   Refills:  0        * order for DME   Quantity:  1 each        Equipment being ordered: Reclining manual w/c with bilateral elevating leg rests.   Refills:  0        * order for DME   Quantity:  1 each        Equipment being ordered: Hospital Bed, fully electric.   Refills:  0        * order for DME   Quantity:  1 each        Equipment being ordered: Wheelchair, manual.   Refills:  0        * order for DME   Quantity:  1 each        Equipment being ordered: Wheelchair, manual, with elevated leg rests and tilt in space back.  Please fax to [facility]; I called them 11/26/16 and they requested the new order.  Face to face is in my 10/26/16 note.   Refills:  0        oxyCODONE 5 MG IR tablet   Commonly known as:  ROXICODONE   Dose:  5 mg   Quantity:  90 tablet        Take 1 tablet (5 mg) by mouth every 4 hours as needed for moderate to severe pain   Refills:  0        pantoprazole 40 MG EC tablet   Commonly known as:  PROTONIX   Dose:  40 mg   Quantity:  180 tablet        Take 1 tablet (40 mg) by mouth daily   Refills:  3        senna-docusate 8.6-50 MG per tablet   Commonly known as:  SENOKOT-S;PERICOLACE   Dose:  1-2 tablet   Quantity:  60 tablet         Take 1-2 tablets by mouth 2 times daily as needed for constipation   Refills:  3        simethicone 80 MG chewable tablet   Commonly known as:  MYLICON   Dose:  80 mg   Quantity:  180 tablet        Take 1 tablet (80 mg) by mouth 4 times daily   Refills:  5        tamsulosin 0.4 MG capsule   Commonly known as:  FLOMAX   Dose:  0.4 mg   Quantity:  90 capsule        Take 1 capsule (0.4 mg) by mouth daily   Refills:  3        Vitamin B-1 100 MG Tabs   Dose:  100 mg   Quantity:  90 tablet        Take 1 tablet (100 mg) by mouth daily   Refills:  3        warfarin 3 MG tablet   Commonly known as:  COUMADIN   Quantity:  180 tablet        6 mg Sat and Thurs and 4.5 mg the rest of the week. Recommend repeat INR 9/21 per [facility] RN.   Refills:  3        * Notice:  This list has 8 medication(s) that are the same as other medications prescribed for you. Read the directions carefully, and ask your doctor or other care provider to review them with you.            Prescriptions were sent or printed at these locations (1 Prescription)                   Other Prescriptions                Printed at Department/Unit printer (1 of 1)         diphenhydrAMINE HCl 12.5 MG/5ML SYRP                Procedures and tests performed during your visit     Basic metabolic panel    CBC with platelets differential    Chest XR,  PA & LAT    INR    Influenza A/B antigen      Orders Needing Specimen Collection     None      Pending Results     Date and Time Order Name Status Description    2/7/2017 2310 Chest XR,  PA & LAT Preliminary             Pending Culture Results     No orders found for last 2 day(s).            Thank you for choosing [facility]       Thank you for choosing [facility] for your care. Our goal is always to provide you with excellent care. Hearing back from our patients is one way we can continue to improve our services. Please take a few minutes to complete the written survey that you may receive in the mail after you visit with us.  "Thank you!        [facility] Information     [employer] [employer] lets you send messages to your doctor, view your test results, renew your prescriptions, schedule appointments and more. To sign up, go to [email or web address] . Click on \"Log in\" on the left side of the screen, which will take you to the Welcome page. Then click on \"Sign up Now\" on the right side of the page.     You will be asked to enter the access code listed below, as well as some personal information. Please follow the directions to create your username and password.     Your access code is: [email or web address]  Expires: 2017  6:30 AM     Your access code will  in 90 days. If you need help or a new code, please call your [facility] clinic or [phone].        Care EveryWhere ID     This is your Care EveryWhere ID. This could be used by other organizations to access your [facility] medical records  [id number]        After Visit Summary       This is your record. Keep this with you and show to your community pharmacist(s) and doctor(s) at your next visit.                  "

## 2017-02-07 NOTE — ED AVS SNAPSHOT
[facility], [place], Emergency Department    [address] [address]    Phone:  [phone]                                       Sohan Rendon   MRN: [id number]    Department:  [facility], Emergency Department   Date of Visit:  2/7/2017           After Visit Summary Signature Page     I have received my discharge instructions, and my questions have been answered. I have discussed any challenges I see with this plan with the nurse or doctor.    ..........................................................................................................................................  Patient/Patient Representative Signature      ..........................................................................................................................................  Patient Representative Print Name and Relationship to Patient    ..................................................               ................................................  Date                                            Time    ..........................................................................................................................................  Reviewed by Signature/Title    ...................................................              ..............................................  Date                                                            Time

## 2017-02-07 NOTE — TELEPHONE ENCOUNTER
Spoke with Almaz, daughter who states her sister was just telling her that they should bring patient to the ED.  Almaz states patient is coughing and his chest hurts.  She denies fever or any change in condition such as fatigue or decreased appetite.      Instructed that she could try the OTC Neti Pot as noted by provider.  Since patient not febrile, fatigued, etc instructed she could also try using Ocean Spray.  Noted patient also has Tessalon on current med list along with Albuterol inhaler which he could use.  Instructed on keeping humidifier running as this may also give patient more comfort.  Instructed if patient becomes febrile, appears to be having difficulty with respirations, fatigued more than usual then he should be seen in ED.  Verbalized understanding on instructions given.    Routing to provider as FYI since next scheduled visit is in one week.    Concetta Burr RN

## 2017-02-07 NOTE — TELEPHONE ENCOUNTER
Thanks for the update.  I'm guessing family will probably take him to ED, though the advice given to them is good advice.

## 2017-02-08 ENCOUNTER — ANTICOAGULATION THERAPY VISIT (OUTPATIENT)
Dept: ANTICOAGULATION | Facility: CLINIC | Age: 67
End: 2017-02-08

## 2017-02-08 VITALS
SYSTOLIC BLOOD PRESSURE: 101 MMHG | TEMPERATURE: 99.3 F | HEIGHT: 68 IN | OXYGEN SATURATION: 95 % | BODY MASS INDEX: 27.58 KG/M2 | RESPIRATION RATE: 16 BRPM | HEART RATE: 61 BPM | DIASTOLIC BLOOD PRESSURE: 82 MMHG | WEIGHT: 182 LBS

## 2017-02-08 DIAGNOSIS — Z95.811 LVAD (LEFT VENTRICULAR ASSIST DEVICE) PRESENT (H): ICD-10-CM

## 2017-02-08 DIAGNOSIS — Z79.01 LONG-TERM (CURRENT) USE OF ANTICOAGULANTS: Primary | ICD-10-CM

## 2017-02-08 LAB
ANION GAP SERPL CALCULATED.3IONS-SCNC: 8 MMOL/L (ref 3–14)
BASOPHILS # BLD AUTO: 0.1 10E9/L (ref 0–0.2)
BASOPHILS NFR BLD AUTO: 0.9 %
BUN SERPL-MCNC: 21 MG/DL (ref 7–30)
CALCIUM SERPL-MCNC: 8.4 MG/DL (ref 8.5–10.1)
CHLORIDE SERPL-SCNC: 104 MMOL/L (ref 94–109)
CO2 SERPL-SCNC: 26 MMOL/L (ref 20–32)
CREAT SERPL-MCNC: 2.16 MG/DL (ref 0.66–1.25)
DIFFERENTIAL METHOD BLD: ABNORMAL
EOSINOPHIL # BLD AUTO: 0.3 10E9/L (ref 0–0.7)
EOSINOPHIL NFR BLD AUTO: 5.5 %
ERYTHROCYTE [DISTWIDTH] IN BLOOD BY AUTOMATED COUNT: 16.8 % (ref 10–15)
FLUAV+FLUBV AG SPEC QL: NEGATIVE
FLUAV+FLUBV AG SPEC QL: NORMAL
GFR SERPL CREATININE-BSD FRML MDRD: 31 ML/MIN/1.7M2
GLUCOSE SERPL-MCNC: 200 MG/DL (ref 70–99)
HCT VFR BLD AUTO: 43 % (ref 40–53)
HGB BLD-MCNC: 14.2 G/DL (ref 13.3–17.7)
IMM GRANULOCYTES # BLD: 0.1 10E9/L (ref 0–0.4)
IMM GRANULOCYTES NFR BLD: 0.9 %
INR PPP: 2.34 (ref 0.86–1.14)
LYMPHOCYTES # BLD AUTO: 0.7 10E9/L (ref 0.8–5.3)
LYMPHOCYTES NFR BLD AUTO: 12.2 %
MCH RBC QN AUTO: 31.6 PG (ref 26.5–33)
MCHC RBC AUTO-ENTMCNC: 33 G/DL (ref 31.5–36.5)
MCV RBC AUTO: 96 FL (ref 78–100)
MONOCYTES # BLD AUTO: 1.1 10E9/L (ref 0–1.3)
MONOCYTES NFR BLD AUTO: 19.1 %
NEUTROPHILS # BLD AUTO: 3.5 10E9/L (ref 1.6–8.3)
NEUTROPHILS NFR BLD AUTO: 61.4 %
NRBC # BLD AUTO: 0 10*3/UL
NRBC BLD AUTO-RTO: 0 /100
PLATELET # BLD AUTO: 125 10E9/L (ref 150–450)
POTASSIUM SERPL-SCNC: 3.8 MMOL/L (ref 3.4–5.3)
RBC # BLD AUTO: 4.49 10E12/L (ref 4.4–5.9)
SODIUM SERPL-SCNC: 137 MMOL/L (ref 133–144)
SPECIMEN SOURCE: NORMAL
WBC # BLD AUTO: 5.7 10E9/L (ref 4–11)

## 2017-02-08 PROCEDURE — 80048 BASIC METABOLIC PNL TOTAL CA: CPT | Performed by: EMERGENCY MEDICINE

## 2017-02-08 PROCEDURE — 85025 COMPLETE CBC W/AUTO DIFF WBC: CPT | Performed by: EMERGENCY MEDICINE

## 2017-02-08 PROCEDURE — 85610 PROTHROMBIN TIME: CPT | Performed by: EMERGENCY MEDICINE

## 2017-02-08 PROCEDURE — 87804 INFLUENZA ASSAY W/OPTIC: CPT | Performed by: EMERGENCY MEDICINE

## 2017-02-08 NOTE — DISCHARGE INSTRUCTIONS
Please make an appointment to follow up with Your Primary Care Provider if not improving.    Diphenhydramine as directed, if needed for cough.    Return to the emergency department for any worsening symptoms or other problems.

## 2017-02-08 NOTE — ED PROVIDER NOTES
History     Chief Complaint   Patient presents with     Cough     The history is provided by the patient and a relative (daughter). The history is limited by a language barrier. No  was used (daughter interpreted for the patient).     Sohan Rendon is a 67 year old male with a history of CHF, stroke, mitral regurgitation s/p MVR, latent TB, hypertension, BPH, GERD, CKD, MI, and ischemic cardiomyopathy who presents for evaluation of a cough. Patient presents with his daughter who provides history. Patient's daughter reports the patient has had cold-like symptoms including sneezing, voice change, and cough for the past three days. Patient also complains of a headache, sore throat, and difficulty sleeping. He has been afebrile. He has taken loratadine for his symptoms with no relief.     I have reviewed the Medications, Allergies, Past Medical and Surgical History, and Social History in the [employer] system.  Past Medical History   Diagnosis Date     Esophagitis 12/2012     EGD with esophagitis and multiple douenal ulcers     CHF (congestive heart failure), NYHA class IV (H) 10/9/2012     s/p HeartMate II.  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  Normal carotid duplex 3/2014.       Clostridium difficile colitis 12/2012      Dysphagia      PEG tube placed in 2012.  Subsequently passed swallow eval in 3/2014     Gout      Anemia of chronic disease      Baseline Hb 8-9      Myocardial infarction (H) 1998     In [facility] without intervention      Ischemic cardiomyopathy      Nonsenile cataract      BE     Fracture of femoral neck, right, closed 2/3/2015     Presumed pathologic as patient is non-weight-bearing and suffered no trauma.  Family declined operative repair during hospital stay 1/23 - 1/30/15.     Gastric and duodenal angiodysplasia with hemorrhage 6/18/2015       Past Surgical History   Procedure Laterality Date     Insert ventricular assist device left (heartmate ii)  10/9/2012     H insert icd  2/10/2011     And pacemaker.  Not BiV     Repair valve mitral  2/9/2012     28 mm Carbomedics 2/3 ring      C cabg, vein, five  2001     Repair patent foramen ovale  10/9/2012     Ir gastro jejunostomy tube placement       Esophagoscopy, gastroscopy, duodenoscopy (egd), combined N/A 6/10/2015     Procedure: COMBINED ESOPHAGOSCOPY, GASTROSCOPY, DUODENOSCOPY (EGD);  Surgeon: Edu Jenkins MD;  Location: [facility]     Esophagoscopy, gastroscopy, duodenoscopy (egd), combined N/A 6/15/2015     Procedure: COMBINED ESOPHAGOSCOPY, GASTROSCOPY, DUODENOSCOPY (EGD);  Surgeon: Yury Bonner MD;  Location: [facility] OR     Phacoemulsification clear cornea with standard intraocular lens implant Right 11/19/2015     Procedure: PHACOEMULSIFICATION CLEAR CORNEA WITH STANDARD INTRAOCULAR LENS IMPLANT;  Surgeon: Violeta Cosme MD;  Location: [facility] OR     Cataract iol, rt/lt Right 11/19/2015       Family History   Problem Relation Age of Onset     Family History Negative No family hx of      Glaucoma No family hx of      Macular Degeneration No family hx of      CANCER No family hx of      no skin cancer       Social History   Substance Use Topics     Smoking status: Former Smoker     Smokeless tobacco: Former User     Alcohol Use: No     No current facility-administered medications for this encounter.     Current Outpatient Prescriptions   Medication     diphenhydrAMINE HCl 12.5 MG/5ML SYRP     losartan  (COZAAR) 25 MG tablet     furosemide (LASIX) 20 MG tablet     allopurinol (ZYLOPRIM) 100 MG tablet     atorvastatin (LIPITOR) 10 MG tablet     order for DME     pantoprazole (PROTONIX) 40 MG enteric coated tablet     warfarin (COUMADIN) 3 MG tablet     finasteride (PROSCAR) 5 MG tablet     tamsulosin (FLOMAX) 0.4 MG 24 hr capsule     senna-docusate (SENOKOT-S;PERICOLACE) 8.6-50 MG per tablet     order for DME     order for DME     azelastine (OPTIVAR) 0.05 % SOLN     loratadine (CLARITIN) 10 MG tablet     levETIRAcetam (KEPPRA) 750 MG tablet     ferrous sulfate (IRON) 325 (65 FE) MG tablet     ascorbic acid (VITAMIN C) 500 MG tablet     metoprolol (TOPROL-XL) 25 MG 24 hr tablet     metoprolol (TOPROL-XL) 50 MG 24 hr tablet     blood glucose (NO BRAND SPECIFIED) lancets standard     blood glucose monitoring (NO BRAND SPECIFIED) test strip     simethicone (MYLICON) 80 MG chewable tablet     order for DME     oxyCODONE (ROXICODONE) 5 MG immediate release tablet     nystatin (MYCOSTATIN) 222511 UNIT/GM POWD     BD SHARPS CONTAINER HOME MISC     carboxymethylcellulose (REFRESH PLUS) 0.5 % SOLN     order for DME     Thiamine HCl (VITAMIN B-1) 100 MG TABS     olopatadine (PATANOL) 0.1 % ophthalmic solution     nitroglycerin (NITROSTAT) 0.4 MG SL tablet     calcium carbonate-vitamin D 500-400 MG-UNIT TABS tablt     blood glucose monitoring (NO BRAND SPECIFIED) lancets     benzonatate (TESSALON) 100 MG capsule     ORDER FOR DME     acetaminophen (TYLENOL) 325 MG tablet     melatonin 1 MG TABS     bisacodyl (DULCOLAX) 5 MG EC tablet     Facility-Administered Medications Ordered in Other Encounters   Medication     oxyCODONE (ROXICODONE) 5 MG immediate release tablet        Allergies   Allergen Reactions     Seasonal Allergies        Review of Systems   Constitutional: Negative for fever.   HENT: Positive for rhinorrhea, sneezing and voice change. Negative for congestion.    Eyes: Negative for redness.   Respiratory: Positive  "for cough. Negative for shortness of breath.    Cardiovascular: Negative for chest pain.   Gastrointestinal: Negative for abdominal pain.   Genitourinary: Negative for difficulty urinating.   Musculoskeletal: Negative for arthralgias and neck stiffness.   Skin: Negative for color change.   Neurological: Positive for headaches.   Psychiatric/Behavioral: Positive for sleep disturbance. Negative for confusion.   All other systems reviewed and are negative.      Physical Exam   BP: (!) 109/91 mmHg  Pulse: 65  Temp: 98.2  F (36.8  C)  Resp: 18  Height: 172.7 cm (5' 8\")  Weight: 82.555 kg (182 lb)  SpO2: 99 %  Physical Exam   Constitutional: He is oriented to person, place, and time. Vital signs are normal. He appears well-developed and well-nourished.  Non-toxic appearance. He does not appear ill. No distress.   HENT:   Head: Normocephalic and atraumatic.   Mouth/Throat: Oropharynx is clear and moist. No oropharyngeal exudate.   Eyes: Conjunctivae and EOM are normal. Pupils are equal, round, and reactive to light. No scleral icterus.   Neck: Normal range of motion. Neck supple. No JVD present. No tracheal deviation present. No thyromegaly present.   Cardiovascular: Normal rate, regular rhythm, normal heart sounds and intact distal pulses.  Exam reveals no gallop and no friction rub.    No murmur heard.  LVAD sounds predominate   Pulmonary/Chest: Effort normal and breath sounds normal. No accessory muscle usage. No respiratory distress. He has no wheezes. He has no rhonchi. He has no rales.   Drive line site without erythema or discharge   Abdominal: Soft. Bowel sounds are normal. He exhibits no distension and no mass. There is no tenderness.   Musculoskeletal: Normal range of motion. He exhibits no edema or tenderness.   Lymphadenopathy:     He has no cervical adenopathy.   Neurological: He is alert and oriented to person, place, and time. He has normal strength. No cranial nerve deficit or sensory deficit.   Skin: Skin " is warm and dry. No rash noted. He is not diaphoretic. No erythema. No pallor.   Psychiatric: He has a normal mood and affect. His behavior is normal.   Nursing note and vitals reviewed.      ED Course     Procedures       11:03 PM  The patient was seen and examined by Dr. Jurado in Room 23.   Results for orders placed or performed during the hospital encounter of 02/07/17   Chest XR,  PA & LAT    Narrative    XR CHEST 2 VW 2/7/2017 11:50 PM    Comparison: 10/22/2016    History: Cough    Findings: AP and lateral views of the chest. Cardiomediastinal  silhouette is enlarged. Postoperative changes of median sternotomy  wires, surgical clips, left chest wall cardiac defibrillator and LVAD.  Low lung volumes. No focal airspace opacity. No pleural effusion. No  pneumothorax.      Impression    Impression:   Previously seen interstitial markings have resolved and pulmonary  vasculature has better distinction. No evidence of pulmonary edema,  pleural effusion or focal airspace opacity. Stable cardiomegaly.    I have personally reviewed the examination and initial interpretation  and I agree with the findings.    ISRAEL PEOPLES MD   CBC with platelets differential   Result Value Ref Range    WBC 5.7 4.0 - 11.0 10e9/L    RBC Count 4.49 4.4 - 5.9 10e12/L    Hemoglobin 14.2 13.3 - 17.7 g/dL    Hematocrit 43.0 40.0 - 53.0 %    MCV 96 78 - 100 fl    MCH 31.6 26.5 - 33.0 pg    MCHC 33.0 31.5 - 36.5 g/dL    RDW 16.8 (H) 10.0 - 15.0 %    Platelet Count 125 (L) 150 - 450 10e9/L    Diff Method Automated Method     % Neutrophils 61.4 %    % Lymphocytes 12.2 %    % Monocytes 19.1 %    % Eosinophils 5.5 %    % Basophils 0.9 %    % Immature Granulocytes 0.9 %    Nucleated RBCs 0 0 /100    Absolute Neutrophil 3.5 1.6 - 8.3 10e9/L    Absolute Lymphocytes 0.7 (L) 0.8 - 5.3 10e9/L    Absolute Monocytes 1.1 0.0 - 1.3 10e9/L    Absolute Eosinophils 0.3 0.0 - 0.7 10e9/L    Absolute Basophils 0.1 0.0 - 0.2 10e9/L    Abs Immature Granulocytes  0.1 0 - 0.4 10e9/L    Absolute Nucleated RBC 0.0    Basic metabolic panel   Result Value Ref Range    Sodium 137 133 - 144 mmol/L    Potassium 3.8 3.4 - 5.3 mmol/L    Chloride 104 94 - 109 mmol/L    Carbon Dioxide 26 20 - 32 mmol/L    Anion Gap 8 3 - 14 mmol/L    Glucose 200 (H) 70 - 99 mg/dL    Urea Nitrogen 21 7 - 30 mg/dL    Creatinine 2.16 (H) 0.66 - 1.25 mg/dL    GFR Estimate 31 (L) >60 mL/min/1.7m2    GFR Estimate If Black 37 (L) >60 mL/min/1.7m2    Calcium 8.4 (L) 8.5 - 10.1 mg/dL   INR   Result Value Ref Range    INR 2.34 (H) 0.86 - 1.14   Influenza A/B antigen   Result Value Ref Range    Influenza A/B Agn Specimen Nasopharyngeal     Influenza A Negative NEG    Influenza B  NEG     Negative   Test results must be correlated with clinical data. If necessary, results   should be confirmed by a molecular assay or viral culture.       *Note: Due to a large number of results and/or encounters for the requested time period, some results have not been displayed. A complete set of results can be found in Results Review.         Assessments & Plan (with Medical Decision Making)   This patient presented to the emergency department complaining of cough, nasal congestion and hoarse voice.  I suspect viral illness.  A rapid influenza is negative, and patient s symptoms have been going on for 3 days; I do not feel that Tamiflu needed to be initiated, as my clinical suspicion for influenza was not significantly high and he is outside of the treatment range.  No evidence of pneumonia on chest x-ray, and no evidence to suggest acutely decompensated heart failure on x-ray.  I did recommend Benadryl for cough at night, and will have him return should he worsen.  This was all discussed through the  with the patient and his family, and he was discharged in good condition.      This part of the medical record was transcribed by Melecio Deng Medical Scribe, from a dictation done by Garett Jurado MD.      I have  reviewed the nursing notes.    I have reviewed the findings, diagnosis, plan and need for follow up with the patient.    Discharge Medication List as of 2/8/2017  1:17 AM      START taking these medications    Details   diphenhydrAMINE HCl 12.5 MG/5ML SYRP Take 50 mg by mouth nightly as needed (Cough), Disp-560 mL, R-0, Local Print             Final diagnoses:   Upper respiratory tract infection, unspecified type   I, Lisset Alexandre, am serving as a trained medical scribe to document services personally performed by Nirmal Jurado MD, based on the provider's statements to me.   I, Nirmal Jurado MD, was physically present and have reviewed and verified the accuracy of this note documented by Lisset Alexandre.    2/7/2017   [facility], [place], EMERGENCY DEPARTMENT      Garett Jurado MD  02/10/17 0911

## 2017-02-08 NOTE — PROGRESS NOTES
INR 2.34 at ED visit late last evening. Was seen for cough. No fevers. New medication was Benadryl PRN at night. No call was made today, as INR was in range on Monday and recommendations were given.

## 2017-02-08 NOTE — ED NOTES
Pt presents via EMS from home with c/o cough for past 3 days that is productive. Pt also c/o throat pain and headache with coughing. Pt Denies SOB. Pt has LVAD/

## 2017-02-09 ENCOUNTER — CARE COORDINATION (OUTPATIENT)
Dept: GERIATRIC MEDICINE | Facility: CLINIC | Age: 67
End: 2017-02-09

## 2017-02-09 NOTE — PROGRESS NOTES
Received notice that client had an ER visit yesterday for a URI. Client was discharged home with Benadryl for cough. Called client and spoke with daughter Almaz to see how client was doing, Almaz states client coughed a lot last night and was sleeping now. Almaz reports they got the Benadryl and it was in liquid form and gave him 5 ml only last night. Almaz said it was late at night and that she could have given him 20 ml. Almaz states client did most of coughing while he was lying in bed. Discussed using pillows to elevate his head. Call got cut off and attempted to call back twice and got a fast busy signal.  iYsel Cunha RN, PHN, [facility]   [phone]

## 2017-02-11 ENCOUNTER — APPOINTMENT (OUTPATIENT)
Dept: CT IMAGING | Facility: CLINIC | Age: 67
DRG: 394 | End: 2017-02-11
Attending: FAMILY MEDICINE
Payer: MEDICARE

## 2017-02-11 ENCOUNTER — APPOINTMENT (OUTPATIENT)
Dept: GENERAL RADIOLOGY | Facility: CLINIC | Age: 67
DRG: 394 | End: 2017-02-11
Attending: FAMILY MEDICINE
Payer: MEDICARE

## 2017-02-11 ENCOUNTER — HOSPITAL ENCOUNTER (INPATIENT)
Facility: CLINIC | Age: 67
LOS: 4 days | Discharge: HOME HEALTH | DRG: 394 | End: 2017-02-15
Attending: FAMILY MEDICINE | Admitting: INTERNAL MEDICINE
Payer: MEDICARE

## 2017-02-11 DIAGNOSIS — K35.30 ACUTE APPENDICITIS WITH LOCALIZED PERITONITIS: Primary | ICD-10-CM

## 2017-02-11 DIAGNOSIS — K35.80 ACUTE APPENDICITIS, UNSPECIFIED ACUTE APPENDICITIS TYPE: ICD-10-CM

## 2017-02-11 DIAGNOSIS — Z95.811 LVAD (LEFT VENTRICULAR ASSIST DEVICE) PRESENT (H): ICD-10-CM

## 2017-02-11 DIAGNOSIS — R05.3 CHRONIC COUGH: ICD-10-CM

## 2017-02-11 DIAGNOSIS — K37 APPENDICITIS: ICD-10-CM

## 2017-02-11 LAB
ALBUMIN SERPL-MCNC: 3.5 G/DL (ref 3.4–5)
ALBUMIN UR-MCNC: NEGATIVE MG/DL
ALP SERPL-CCNC: 115 U/L (ref 40–150)
ALT SERPL W P-5'-P-CCNC: 26 U/L (ref 0–70)
ANION GAP SERPL CALCULATED.3IONS-SCNC: 9 MMOL/L (ref 3–14)
APPEARANCE UR: CLEAR
AST SERPL W P-5'-P-CCNC: 52 U/L (ref 0–45)
BASOPHILS # BLD AUTO: 0 10E9/L (ref 0–0.2)
BASOPHILS NFR BLD AUTO: 0.4 %
BILIRUB SERPL-MCNC: 0.8 MG/DL (ref 0.2–1.3)
BILIRUB UR QL STRIP: NEGATIVE
BUN SERPL-MCNC: 27 MG/DL (ref 7–30)
CALCIUM SERPL-MCNC: 8.8 MG/DL (ref 8.5–10.1)
CHLORIDE SERPL-SCNC: 106 MMOL/L (ref 94–109)
CO2 SERPL-SCNC: 24 MMOL/L (ref 20–32)
COLOR UR AUTO: ABNORMAL
CREAT SERPL-MCNC: 1.91 MG/DL (ref 0.66–1.25)
DIFFERENTIAL METHOD BLD: ABNORMAL
EOSINOPHIL # BLD AUTO: 0.5 10E9/L (ref 0–0.7)
EOSINOPHIL NFR BLD AUTO: 6.2 %
ERYTHROCYTE [DISTWIDTH] IN BLOOD BY AUTOMATED COUNT: 16.2 % (ref 10–15)
ERYTHROCYTE [DISTWIDTH] IN BLOOD BY AUTOMATED COUNT: 16.3 % (ref 10–15)
GFR SERPL CREATININE-BSD FRML MDRD: 35 ML/MIN/1.7M2
GLUCOSE SERPL-MCNC: 155 MG/DL (ref 70–99)
GLUCOSE UR STRIP-MCNC: 30 MG/DL
HCT VFR BLD AUTO: 41.9 % (ref 40–53)
HCT VFR BLD AUTO: 44.9 % (ref 40–53)
HGB BLD-MCNC: 13.6 G/DL (ref 13.3–17.7)
HGB BLD-MCNC: 15 G/DL (ref 13.3–17.7)
HGB UR QL STRIP: ABNORMAL
IMM GRANULOCYTES # BLD: 0 10E9/L (ref 0–0.4)
IMM GRANULOCYTES NFR BLD: 0.4 %
INR PPP: 1.7 (ref 0.86–1.14)
KETONES UR STRIP-MCNC: NEGATIVE MG/DL
LACTATE BLD-SCNC: 1.1 MMOL/L (ref 0.7–2.1)
LDH SERPL L TO P-CCNC: 943 U/L (ref 85–227)
LEUKOCYTE ESTERASE UR QL STRIP: NEGATIVE
LMWH PPP CHRO-ACNC: 0.3 IU/ML
LYMPHOCYTES # BLD AUTO: 0.9 10E9/L (ref 0.8–5.3)
LYMPHOCYTES NFR BLD AUTO: 11.7 %
MCH RBC QN AUTO: 31.3 PG (ref 26.5–33)
MCH RBC QN AUTO: 31.8 PG (ref 26.5–33)
MCHC RBC AUTO-ENTMCNC: 32.5 G/DL (ref 31.5–36.5)
MCHC RBC AUTO-ENTMCNC: 33.4 G/DL (ref 31.5–36.5)
MCV RBC AUTO: 95 FL (ref 78–100)
MCV RBC AUTO: 97 FL (ref 78–100)
MONOCYTES # BLD AUTO: 0.7 10E9/L (ref 0–1.3)
MONOCYTES NFR BLD AUTO: 9.2 %
NEUTROPHILS # BLD AUTO: 5.3 10E9/L (ref 1.6–8.3)
NEUTROPHILS NFR BLD AUTO: 72.1 %
NITRATE UR QL: NEGATIVE
NRBC # BLD AUTO: 0 10*3/UL
NRBC BLD AUTO-RTO: 0 /100
PH UR STRIP: 6 PH (ref 5–7)
PLATELET # BLD AUTO: 124 10E9/L (ref 150–450)
PLATELET # BLD AUTO: 135 10E9/L (ref 150–450)
POTASSIUM SERPL-SCNC: 4.2 MMOL/L (ref 3.4–5.3)
PROT SERPL-MCNC: 7.5 G/DL (ref 6.8–8.8)
RADIOLOGIST FLAGS: ABNORMAL
RBC # BLD AUTO: 4.34 10E12/L (ref 4.4–5.9)
RBC # BLD AUTO: 4.72 10E12/L (ref 4.4–5.9)
RBC #/AREA URNS AUTO: <1 /HPF (ref 0–2)
SODIUM SERPL-SCNC: 139 MMOL/L (ref 133–144)
SP GR UR STRIP: 1.01 (ref 1–1.03)
TROPONIN I SERPL-MCNC: NORMAL UG/L (ref 0–0.04)
URN SPEC COLLECT METH UR: ABNORMAL
UROBILINOGEN UR STRIP-MCNC: NORMAL MG/DL (ref 0–2)
WBC # BLD AUTO: 10.8 10E9/L (ref 4–11)
WBC # BLD AUTO: 7.3 10E9/L (ref 4–11)
WBC #/AREA URNS AUTO: <1 /HPF (ref 0–2)

## 2017-02-11 PROCEDURE — A9270 NON-COVERED ITEM OR SERVICE: HCPCS | Mod: GY | Performed by: INTERNAL MEDICINE

## 2017-02-11 PROCEDURE — 85610 PROTHROMBIN TIME: CPT | Performed by: EMERGENCY MEDICINE

## 2017-02-11 PROCEDURE — 21400003 ZZH R&B CCU CRITICAL UMMC

## 2017-02-11 PROCEDURE — 83605 ASSAY OF LACTIC ACID: CPT | Performed by: EMERGENCY MEDICINE

## 2017-02-11 PROCEDURE — 25000128 H RX IP 250 OP 636: Performed by: FAMILY MEDICINE

## 2017-02-11 PROCEDURE — 96360 HYDRATION IV INFUSION INIT: CPT | Performed by: FAMILY MEDICINE

## 2017-02-11 PROCEDURE — 85027 COMPLETE CBC AUTOMATED: CPT | Performed by: INTERNAL MEDICINE

## 2017-02-11 PROCEDURE — 80053 COMPREHEN METABOLIC PANEL: CPT | Performed by: EMERGENCY MEDICINE

## 2017-02-11 PROCEDURE — 25000132 ZZH RX MED GY IP 250 OP 250 PS 637: Mod: GY | Performed by: INTERNAL MEDICINE

## 2017-02-11 PROCEDURE — 99285 EMERGENCY DEPT VISIT HI MDM: CPT | Performed by: FAMILY MEDICINE

## 2017-02-11 PROCEDURE — 85520 HEPARIN ASSAY: CPT | Performed by: INTERNAL MEDICINE

## 2017-02-11 PROCEDURE — 25800025 ZZH RX 258: Performed by: INTERNAL MEDICINE

## 2017-02-11 PROCEDURE — 40000802 ZZH SITE CHECK

## 2017-02-11 PROCEDURE — 25000128 H RX IP 250 OP 636: Performed by: INTERNAL MEDICINE

## 2017-02-11 PROCEDURE — 84484 ASSAY OF TROPONIN QUANT: CPT | Performed by: EMERGENCY MEDICINE

## 2017-02-11 PROCEDURE — 96361 HYDRATE IV INFUSION ADD-ON: CPT | Performed by: FAMILY MEDICINE

## 2017-02-11 PROCEDURE — 99221 1ST HOSP IP/OBS SF/LOW 40: CPT | Mod: GC | Performed by: INTERNAL MEDICINE

## 2017-02-11 PROCEDURE — 99285 EMERGENCY DEPT VISIT HI MDM: CPT | Mod: 25 | Performed by: FAMILY MEDICINE

## 2017-02-11 PROCEDURE — 74176 CT ABD & PELVIS W/O CONTRAST: CPT

## 2017-02-11 PROCEDURE — 40000141 ZZH STATISTIC PERIPHERAL IV START W/O US GUIDANCE

## 2017-02-11 PROCEDURE — 71020 XR CHEST 2 VW: CPT

## 2017-02-11 PROCEDURE — 85025 COMPLETE CBC W/AUTO DIFF WBC: CPT | Performed by: EMERGENCY MEDICINE

## 2017-02-11 PROCEDURE — 83615 LACTATE (LD) (LDH) ENZYME: CPT | Performed by: EMERGENCY MEDICINE

## 2017-02-11 PROCEDURE — 25000125 ZZHC RX 250: Performed by: INTERNAL MEDICINE

## 2017-02-11 PROCEDURE — 81001 URINALYSIS AUTO W/SCOPE: CPT | Performed by: FAMILY MEDICINE

## 2017-02-11 PROCEDURE — 36415 COLL VENOUS BLD VENIPUNCTURE: CPT | Performed by: INTERNAL MEDICINE

## 2017-02-11 PROCEDURE — 93010 ELECTROCARDIOGRAM REPORT: CPT | Mod: Z6 | Performed by: FAMILY MEDICINE

## 2017-02-11 RX ORDER — ATORVASTATIN CALCIUM 10 MG/1
10 TABLET, FILM COATED ORAL DAILY
Status: DISCONTINUED | OUTPATIENT
Start: 2017-02-11 | End: 2017-02-15 | Stop reason: HOSPADM

## 2017-02-11 RX ORDER — ACETAMINOPHEN 325 MG/1
650 TABLET ORAL EVERY 6 HOURS PRN
Status: DISCONTINUED | OUTPATIENT
Start: 2017-02-11 | End: 2017-02-15 | Stop reason: HOSPADM

## 2017-02-11 RX ORDER — BENZONATATE 100 MG/1
100 CAPSULE ORAL EVERY MORNING
Status: DISCONTINUED | OUTPATIENT
Start: 2017-02-12 | End: 2017-02-11

## 2017-02-11 RX ORDER — ASCORBIC ACID 500 MG
500 TABLET ORAL DAILY
Status: DISCONTINUED | OUTPATIENT
Start: 2017-02-11 | End: 2017-02-15 | Stop reason: HOSPADM

## 2017-02-11 RX ORDER — ALLOPURINOL 100 MG/1
100 TABLET ORAL DAILY
Status: DISCONTINUED | OUTPATIENT
Start: 2017-02-11 | End: 2017-02-15 | Stop reason: HOSPADM

## 2017-02-11 RX ORDER — CARBOXYMETHYLCELLULOSE SODIUM 5 MG/ML
1 SOLUTION/ DROPS OPHTHALMIC 3 TIMES DAILY PRN
Status: DISCONTINUED | OUTPATIENT
Start: 2017-02-11 | End: 2017-02-15 | Stop reason: HOSPADM

## 2017-02-11 RX ORDER — AMOXICILLIN 250 MG
1-2 CAPSULE ORAL 2 TIMES DAILY PRN
Status: DISCONTINUED | OUTPATIENT
Start: 2017-02-11 | End: 2017-02-15 | Stop reason: HOSPADM

## 2017-02-11 RX ORDER — BISACODYL 5 MG/1
5 TABLET, DELAYED RELEASE ORAL DAILY PRN
Status: DISCONTINUED | OUTPATIENT
Start: 2017-02-11 | End: 2017-02-11

## 2017-02-11 RX ORDER — NALOXONE HYDROCHLORIDE 0.4 MG/ML
.1-.4 INJECTION, SOLUTION INTRAMUSCULAR; INTRAVENOUS; SUBCUTANEOUS
Status: DISCONTINUED | OUTPATIENT
Start: 2017-02-11 | End: 2017-02-15 | Stop reason: HOSPADM

## 2017-02-11 RX ORDER — AMOXICILLIN 250 MG
1 CAPSULE ORAL AT BEDTIME
Status: DISCONTINUED | OUTPATIENT
Start: 2017-02-11 | End: 2017-02-15 | Stop reason: HOSPADM

## 2017-02-11 RX ORDER — BISACODYL 10 MG
10 SUPPOSITORY, RECTAL RECTAL DAILY
Status: DISCONTINUED | OUTPATIENT
Start: 2017-02-11 | End: 2017-02-15 | Stop reason: HOSPADM

## 2017-02-11 RX ORDER — METOPROLOL SUCCINATE 50 MG/1
50 TABLET, EXTENDED RELEASE ORAL DAILY
Status: DISCONTINUED | OUTPATIENT
Start: 2017-02-11 | End: 2017-02-11

## 2017-02-11 RX ORDER — ERTAPENEM 1 G/1
1 INJECTION, POWDER, LYOPHILIZED, FOR SOLUTION INTRAMUSCULAR; INTRAVENOUS ONCE
Status: COMPLETED | OUTPATIENT
Start: 2017-02-11 | End: 2017-02-11

## 2017-02-11 RX ORDER — HEPARIN SODIUM 10000 [USP'U]/100ML
0-3500 INJECTION, SOLUTION INTRAVENOUS CONTINUOUS
Status: DISCONTINUED | OUTPATIENT
Start: 2017-02-11 | End: 2017-02-13

## 2017-02-11 RX ORDER — PANTOPRAZOLE SODIUM 40 MG/1
40 TABLET, DELAYED RELEASE ORAL DAILY
Status: DISCONTINUED | OUTPATIENT
Start: 2017-02-12 | End: 2017-02-15 | Stop reason: HOSPADM

## 2017-02-11 RX ORDER — OLOPATADINE HYDROCHLORIDE 1 MG/ML
1 SOLUTION/ DROPS OPHTHALMIC 2 TIMES DAILY
Status: DISCONTINUED | OUTPATIENT
Start: 2017-02-11 | End: 2017-02-15 | Stop reason: HOSPADM

## 2017-02-11 RX ORDER — SIMETHICONE 80 MG
80 TABLET,CHEWABLE ORAL 4 TIMES DAILY
Status: DISCONTINUED | OUTPATIENT
Start: 2017-02-11 | End: 2017-02-15 | Stop reason: HOSPADM

## 2017-02-11 RX ORDER — FINASTERIDE 5 MG/1
5 TABLET, FILM COATED ORAL DAILY
Status: DISCONTINUED | OUTPATIENT
Start: 2017-02-12 | End: 2017-02-15 | Stop reason: HOSPADM

## 2017-02-11 RX ORDER — NITROGLYCERIN 0.4 MG/1
0.4 TABLET SUBLINGUAL EVERY 5 MIN PRN
Status: DISCONTINUED | OUTPATIENT
Start: 2017-02-11 | End: 2017-02-15 | Stop reason: HOSPADM

## 2017-02-11 RX ORDER — METOPROLOL SUCCINATE 25 MG/1
25 TABLET, EXTENDED RELEASE ORAL EVERY EVENING
Status: DISCONTINUED | OUTPATIENT
Start: 2017-02-11 | End: 2017-02-15 | Stop reason: HOSPADM

## 2017-02-11 RX ORDER — DIPHENHYDRAMINE HCL 12.5MG/5ML
50 LIQUID (ML) ORAL
Status: DISCONTINUED | OUTPATIENT
Start: 2017-02-11 | End: 2017-02-15 | Stop reason: HOSPADM

## 2017-02-11 RX ORDER — AZELASTINE HYDROCHLORIDE 0.5 MG/ML
1 SOLUTION/ DROPS OPHTHALMIC 2 TIMES DAILY
Status: DISCONTINUED | OUTPATIENT
Start: 2017-02-11 | End: 2017-02-15 | Stop reason: HOSPADM

## 2017-02-11 RX ORDER — OXYCODONE HYDROCHLORIDE 5 MG/1
5 TABLET ORAL EVERY 4 HOURS PRN
Status: DISCONTINUED | OUTPATIENT
Start: 2017-02-11 | End: 2017-02-15 | Stop reason: HOSPADM

## 2017-02-11 RX ORDER — BISACODYL 5 MG
5 TABLET, DELAYED RELEASE (ENTERIC COATED) ORAL DAILY PRN
Status: DISCONTINUED | OUTPATIENT
Start: 2017-02-11 | End: 2017-02-15 | Stop reason: HOSPADM

## 2017-02-11 RX ORDER — GUAIFENESIN/DEXTROMETHORPHAN 100-10MG/5
5 SYRUP ORAL EVERY 4 HOURS PRN
Status: DISCONTINUED | OUTPATIENT
Start: 2017-02-11 | End: 2017-02-15 | Stop reason: HOSPADM

## 2017-02-11 RX ORDER — TAMSULOSIN HYDROCHLORIDE 0.4 MG/1
0.4 CAPSULE ORAL DAILY
Status: DISCONTINUED | OUTPATIENT
Start: 2017-02-11 | End: 2017-02-15 | Stop reason: HOSPADM

## 2017-02-11 RX ORDER — LEVETIRACETAM 750 MG/1
750 TABLET ORAL 2 TIMES DAILY
Status: DISCONTINUED | OUTPATIENT
Start: 2017-02-11 | End: 2017-02-15 | Stop reason: HOSPADM

## 2017-02-11 RX ORDER — NYSTATIN 100000 [USP'U]/G
POWDER TOPICAL 3 TIMES DAILY PRN
Status: DISCONTINUED | OUTPATIENT
Start: 2017-02-11 | End: 2017-02-15 | Stop reason: HOSPADM

## 2017-02-11 RX ORDER — THIAMINE HCL 50 MG
100 TABLET ORAL DAILY
Status: DISCONTINUED | OUTPATIENT
Start: 2017-02-11 | End: 2017-02-15 | Stop reason: HOSPADM

## 2017-02-11 RX ORDER — FERROUS SULFATE 325(65) MG
325 TABLET ORAL
Status: DISCONTINUED | OUTPATIENT
Start: 2017-02-12 | End: 2017-02-15 | Stop reason: HOSPADM

## 2017-02-11 RX ADMIN — THIAMINE HCL (VITAMIN B1) 50 MG TABLET 100 MG: at 17:10

## 2017-02-11 RX ADMIN — SODIUM CHLORIDE 500 ML: 0.9 INJECTION, SOLUTION INTRAVENOUS at 07:57

## 2017-02-11 RX ADMIN — ATORVASTATIN CALCIUM 10 MG: 10 TABLET, FILM COATED ORAL at 17:09

## 2017-02-11 RX ADMIN — SENNOSIDES AND DOCUSATE SODIUM 1 TABLET: 8.6; 5 TABLET ORAL at 21:52

## 2017-02-11 RX ADMIN — ACETAMINOPHEN 650 MG: 325 TABLET, FILM COATED ORAL at 19:40

## 2017-02-11 RX ADMIN — OXYCODONE HYDROCHLORIDE AND ACETAMINOPHEN 500 MG: 500 TABLET ORAL at 17:09

## 2017-02-11 RX ADMIN — Medication 1 MG: at 21:52

## 2017-02-11 RX ADMIN — ERTAPENEM SODIUM 1 G: 1 INJECTION, POWDER, LYOPHILIZED, FOR SOLUTION INTRAMUSCULAR; INTRAVENOUS at 12:30

## 2017-02-11 RX ADMIN — OLOPATADINE HYDROCHLORIDE 1 DROP: 1 SOLUTION/ DROPS OPHTHALMIC at 19:39

## 2017-02-11 RX ADMIN — WARFARIN SODIUM 4.5 MG: 2.5 TABLET ORAL at 18:26

## 2017-02-11 RX ADMIN — SIMETHICONE CHEW TAB 80 MG 80 MG: 80 TABLET ORAL at 19:40

## 2017-02-11 RX ADMIN — DEXTROSE AND SODIUM CHLORIDE: 5; 450 INJECTION, SOLUTION INTRAVENOUS at 17:09

## 2017-02-11 RX ADMIN — ALLOPURINOL 100 MG: 100 TABLET ORAL at 17:09

## 2017-02-11 RX ADMIN — TAMSULOSIN HYDROCHLORIDE 0.4 MG: 0.4 CAPSULE ORAL at 17:09

## 2017-02-11 RX ADMIN — LEVETIRACETAM 750 MG: 750 TABLET, FILM COATED ORAL at 19:40

## 2017-02-11 RX ADMIN — METOPROLOL SUCCINATE 25 MG: 25 TABLET, FILM COATED, EXTENDED RELEASE ORAL at 19:40

## 2017-02-11 RX ADMIN — AZELASTINE HYDROCHLORIDE 1 DROP: 0.5 SOLUTION/ DROPS INTRAOCULAR at 19:39

## 2017-02-11 RX ADMIN — HEPARIN SODIUM 800 UNITS/HR: 10000 INJECTION, SOLUTION INTRAVENOUS at 16:35

## 2017-02-11 ASSESSMENT — ENCOUNTER SYMPTOMS
CHILLS: 0
EYE REDNESS: 0
SHORTNESS OF BREATH: 0
COLOR CHANGE: 0
ABDOMINAL PAIN: 1
ARTHRALGIAS: 0
DIFFICULTY URINATING: 0
HEADACHES: 0
NAUSEA: 0
FEVER: 0
CONFUSION: 0
NECK STIFFNESS: 0

## 2017-02-11 ASSESSMENT — PAIN DESCRIPTION - DESCRIPTORS: DESCRIPTORS: ACHING

## 2017-02-11 NOTE — PROGRESS NOTES
Admission    Diagnosis: Appendicitis  Admitted from: ED  Via: Cart  Accompanied by: family  Belongings: Placed in closet; valuables sent home with family, declined sending any items to security.  Admission Profile: Complete  Teaching: orientation to unit, call don't fall, use of console, meal times, visiting hours, when to call for the RN (angina/sob/dizzyness, etc.), and enforced importance of safety   Access: PIV  Telemetry: Placed on patient  Height/Weight: Complete

## 2017-02-11 NOTE — CONSULTS
GENERAL SURGERY CONSULT  February 11, 2017      HISTORY PRESENTING ILLNESS: Sohan Rendon is a 67 year old male with 67 year old male with hx of CHF, stroke, MVR, CKD, ICM s/p LVAD who presents with abdominal pain starting 3 days ago. The pain has gotten worse and acute worsened last night. He denies any fevers, chills, nausea, vomiting, changes in appetite. His last BM was 2 days ago reported as a hard stool. He denies any urinary symptoms.    REVIEW OF SYSTEMS: As noted above. No headache, dizziness. No fever, chills. No chest pain or shortness of breath. No nausea, vomiting. No diarrhea or constipation. No urinary difficulties. No muscle or body aches.     PAST MEDICAL HISTORY:   Past Medical History   Diagnosis Date     Esophagitis 12/2012     EGD with esophagitis and multiple douenal ulcers     CHF (congestive heart failure), NYHA class IV (H) 10/9/2012     s/p HeartMate II.  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  Normal carotid duplex 3/2014.       Clostridium difficile colitis 12/2012      Dysphagia      PEG tube placed in 2012.  Subsequently passed swallow eval in 3/2014     Gout      Anemia of chronic disease      Baseline Hb 8-9     Myocardial infarction (H) 1998     In [facility] without intervention      Ischemic cardiomyopathy      Nonsenile cataract      BE     Fracture of femoral neck, right, closed 2/3/2015     Presumed pathologic as patient is  non-weight-bearing and suffered no trauma.  Family declined operative repair during hospital stay 1/23 - 1/30/15.     Gastric and duodenal angiodysplasia with hemorrhage 6/18/2015       SURGICAL HISTORY:   Past Surgical History   Procedure Laterality Date     Insert ventricular assist device left (heartmate ii)  10/9/2012     H insert icd  2/10/2011     And pacemaker.  Not BiV     Repair valve mitral  2/9/2012     28 mm Carbomedics 2/3 ring      C cabg, vein, five  2001     Repair patent foramen ovale  10/9/2012     Ir gastro jejunostomy tube placement       Esophagoscopy, gastroscopy, duodenoscopy (egd), combined N/A 6/10/2015     Procedure: COMBINED ESOPHAGOSCOPY, GASTROSCOPY, DUODENOSCOPY (EGD);  Surgeon: Edu Jenkins MD;  Location: [facility]     Esophagoscopy, gastroscopy, duodenoscopy (egd), combined N/A 6/15/2015     Procedure: COMBINED ESOPHAGOSCOPY, GASTROSCOPY, DUODENOSCOPY (EGD);  Surgeon: Yury Bonner MD;  Location: [facility]     Phacoemulsification clear cornea with standard intraocular lens implant Right 11/19/2015     Procedure: PHACOEMULSIFICATION CLEAR CORNEA WITH STANDARD INTRAOCULAR LENS IMPLANT;  Surgeon: Violeta Cosme MD;  Location: [facility] OR     Cataract iol, rt/lt Right 11/19/2015       SOCIAL HISTORY:   Social History     Social History     Marital Status:      Spouse Name: N/A     Number of Children: N/A     Years of Education: N/A     Occupational History     Not on file.     Social History Main Topics     Smoking status: Former Smoker     Smokeless tobacco: Former User     Alcohol Use: No     Drug Use: No     Sexual Activity: Not on file     Other Topics Concern     Not on file     Social History Narrative       FAMILY HISTORY: No bleeding/clotting disorders nor problems with anesthesia.     ALLERGIES:      Allergies   Allergen Reactions     Seasonal Allergies        MEDICATIONS:    Current Facility-Administered Medications on File Prior to Encounter:  oxyCODONE (ROXICODONE) 5 MG  immediate release tablet     Current Outpatient Prescriptions on File Prior to Encounter:  losartan (COZAAR) 25 MG tablet Take 1 tablet (25 mg) by mouth daily   furosemide (LASIX) 20 MG tablet Take one tablet by mouth once daily as needed for fluid overload.   allopurinol (ZYLOPRIM) 100 MG tablet Take 1 tablet (100 mg) by mouth daily   atorvastatin (LIPITOR) 10 MG tablet Take 1 tablet (10 mg) by mouth daily   order for DME Equipment being ordered: Wheelchair, manual, with elevated leg rests and tilt in space back.  Please fax to [facility]; I called them 11/26/16 and they requested the new order.  Face to face is in my 10/26/16 note.   pantoprazole (PROTONIX) 40 MG enteric coated tablet Take 1 tablet (40 mg) by mouth daily   warfarin (COUMADIN) 3 MG tablet 6 mg Sat and Thurs and 4.5 mg the rest of the week. Recommend repeat INR 9/21 per [facility] RN.   finasteride (PROSCAR) 5 MG tablet Take 1 tablet (5 mg) by mouth daily   tamsulosin (FLOMAX) 0.4 MG 24 hr capsule Take 1 capsule (0.4 mg) by mouth daily   senna-docusate (SENOKOT-S;PERICOLACE) 8.6-50 MG per tablet Take 1-2 tablets by mouth 2 times daily as needed for constipation   order for DME Equipment being ordered: Wheelchair, manual.   order for DME Equipment being ordered: Hospital Bed, fully electric.   azelastine (OPTIVAR) 0.05 % SOLN Apply 1 drop to eye 2 times daily   loratadine (CLARITIN) 10 MG tablet Take 1 tablet (10 mg) by mouth daily   levETIRAcetam (KEPPRA) 750 MG tablet Take 1 tablet (750 mg) by mouth 2 times daily   ferrous sulfate (IRON) 325 (65 FE) MG tablet Take 1 tablet (325 mg) by mouth daily (with breakfast)   ascorbic acid (VITAMIN C) 500 MG tablet Take 1 tablet (500 mg) by mouth daily With iron pill   metoprolol (TOPROL-XL) 25 MG 24 hr tablet Take 1 tablet (25 mg) by mouth every evening   metoprolol (TOPROL-XL) 50 MG 24 hr tablet Take 1 tablet (50 mg) by mouth daily   blood glucose (NO BRAND SPECIFIED) lancets standard Use to test blood sugar 2 times  daily or as directed.   blood glucose monitoring (NO BRAND SPECIFIED) test strip Use to test blood sugar 2 times daily or as directed.   simethicone (MYLICON) 80 MG chewable tablet Take 1 tablet (80 mg) by mouth 4 times daily   order for DME Equipment being ordered: Reclining manual w/c with bilateral elevating leg rests.   nystatin (MYCOSTATIN) 447872 UNIT/GM POWD Apply to affected areas of skin in the groin and on the scrotum three times a day as needed.   BD SHARPS CONTAINER HOME MISC 1 each as needed   carboxymethylcellulose (REFRESH PLUS) 0.5 % SOLN Place 1 drop into the right eye 3 times daily as needed for other (eye irritation or discomfort.)   order for DME Equipment being ordered: Bilateral leg supports for manual wheelchair.   Thiamine HCl (VITAMIN B-1) 100 MG TABS Take 1 tablet (100 mg) by mouth daily   olopatadine (PATANOL) 0.1 % ophthalmic solution Place 1 drop into both eyes 2 times daily   nitroglycerin (NITROSTAT) 0.4 MG SL tablet Place 1 tablet (0.4 mg) under the tongue every 5 minutes as needed for chest pain   calcium carbonate-vitamin D 500-400 MG-UNIT TABS tablt Take 1 tablet by mouth 2 times daily (Patient taking differently: Take 1 tablet by mouth daily )   blood glucose monitoring (NO BRAND SPECIFIED) lancets Use to test blood sugars 2 times daily or as directed.   benzonatate (TESSALON) 100 MG capsule Take 1 capsule (100 mg) by mouth 3 times daily as needed for cough   ORDER FOR DME Equipment being ordered: Lift chair.Please see indications and face-to-face encounter details in 2/3/15 Office Visit note.   acetaminophen (TYLENOL) 325 MG tablet Take 2 tablets (650 mg) by mouth every 6 hours   melatonin 1 MG TABS Take 1 tablet (1 mg) by mouth At Bedtime   bisacodyl (DULCOLAX) 5 MG EC tablet Take 1 tablet (5 mg) by mouth daily as needed for constipation   diphenhydrAMINE HCl 12.5 MG/5ML SYRP Take 50 mg by mouth nightly as needed (Cough)   oxyCODONE (ROXICODONE) 5 MG immediate release tablet  Take 1 tablet (5 mg) by mouth every 4 hours as needed for moderate to severe pain       PHYSICAL EXAMINATION:  Temp:  [98.5  F (36.9  C)] 98.5  F (36.9  C)  Pulse:  [73] 73  Heart Rate:  [] 105  Resp:  [16-28] 16  BP: (109-133)/() 109/100 mmHg  SpO2:  [97 %-99 %] 99 %  General: Alert, well-appearing in no acute distress.  HEENT: Normocephalic, atraumatic. Patent nares.   Neck: No cervical lymphadenopathy. No thyromegaly.   Chest wall: Symmetric thorax. No masses or tenderness to palpation.   Respiratory: Non-labored breathing. Lung sounds clear to auscultation bilaterally.   Cardiovascular: LVAD in place  Gastrointestinal: Abdomen soft, non-distended, mildly tender in RLQ, no rebound or guarding  Extremities: Moving all four extremities. No limb deformities.   Skin: As noted above. No rashes or lesions appreciated.    LABS: Reviewed.   Arterial Blood Gases   No lab results found in last 7 days.  Complete Blood Count     Recent Labs  Lab 02/11/17 0651 02/08/17 0016   WBC 7.3 5.7   HGB 15.0 14.2   * 125*     Basic Metabolic Panel    Recent Labs  Lab 02/11/17 0651 02/08/17  0016    137   POTASSIUM 4.2 3.8   CHLORIDE 106 104   CO2 24 26   BUN 27 21   CR 1.91* 2.16*   * 200*     Liver Function Tests    Recent Labs  Lab 02/11/17 0651 02/08/17 0016 02/06/17   AST 52*  --   --    ALT 26  --   --    ALKPHOS 115  --   --    BILITOTAL 0.8  --   --    ALBUMIN 3.5  --   --    INR 1.70* 2.34* 2.20     Pancreatic Enzymes  No lab results found in last 7 days.  Coagulation Profile    Recent Labs  Lab 02/11/17 0651 02/08/17 0016 02/06/17   INR 1.70* 2.34* 2.20         IMAGING:  Results for orders placed or performed during the hospital encounter of 02/11/17   Chest XR,  PA & LAT    Narrative    XR CHEST 2 VW 2/11/2017 10:07 AM    History: look pneumonia- worsening cough over last 2 days.    Comparison: None.    Findings: AP and lateral views of the chest. LVAD and implantable  cardiac  defibrillator over the left chest. No focal airspace opacity.  Cardiomegaly. Bones, subcutaneous soft tissues, visualized upper  abdomen are unremarkable. No pleural effusion.      Impression    Impression:   1. No acute cardiopulmonary abnormality.  2. Implantable cardiac defibrillator and LVAD are unchanged.    JENNIFER RIVERO   CT Abdomen Pelvis w/o Contrast     Value    Radiologist flags Appendicitis. (Urgent)    Narrative    CT ABDOMEN PELVIS W/O CONTRAST 2/11/2017 9:57 AM    History: RLQ abdominal pain- look for appendicitis, colitis or fluid  collection in abdomen to cause pain    Comparison: 9/16/2016    Technique: Helical CT images were obtained from the dome of the liver  to the symphysis pubis with IV contrast.  Images were reconstructed in  multiple planes using multiple reconstruction algorithms.    Findings:  Lung bases: Mild bibasilar atelectasis. Cystic change in the left  posterior lateral base. Cardiomegaly with partially visualized L that  and pacer wires.    Abdomen and pelvis: The appendix is mildly dilated, measuring up to 10  mm and maximum dimension with surrounding inflammatory change. No  abscess or free air. Moderate fecal load. No dilated loops of small or  large bowel. Colonic diverticulosis. Noncontrast evaluation of the  remainder of the abdomen is unremarkable except for multiple renal  cysts, which are incompletely characterized on noncontrast evaluation,  and visualization of which are limited secondary to streak artifact  from overlying LVAD. Nonobstructing nephrolithiasis. There is an  exophytic structure arising from the superior pole of the right  kidney, which is smaller than on the prior exam but is slightly  hyperdense. This favors a hemorrhagic/proteinaceous cyst given the  decrease in size. Bilateral nonobstructing nephrolithiasis.    Subcutaneous soft tissues: Surgical clips in the right groin. Mild  anasarca. Small ventral abdominal hernia.    Bones: Chronic fracture of  the right femoral neck, not significantly  changed in appearance since 9/16/2016. No aggressive appearing bony  lesion or acute fracture.      Impression    Impression:   1. Findings concerning for nonperforated appendicitis, including  dilation of the appendix and periappendiceal inflammatory change.  2. Nonobstructing nephrolithiasis in both kidneys. No ureteral or  bladder stone.      [Urgent Result: Appendicitis.]    Finding was identified on 2/11/2017 10:10 AM.     Dr. Horne was contacted by Dr. Salguero at 2/11/2017 10:15 AM and  verbalized understanding of the urgent finding.          JENNIFER SALGUERO     *Note: Due to a large number of results and/or encounters for the requested time period, some results have not been displayed. A complete set of results can be found in Results Review.         CO-MORBIDITIES:   Appendicitis    ASSESSMENT: Sohan Rendon is a 67 year old male with complex PMHx including CHF, ICM s/p LVAD, CKD who presents with acute appendicitis. Discusses with patient options for surgical management including risks and benefits as well as alternatives in management. Patient and family reluctant to undergo surgery at this time given patients medical comorbidities and would like to treat conservatively with antibiotics at this time.    PLAN:    - Keep NPO  - Continue Ertapenem  - Surgery to follow.    Patientfindings and plan discussed with staff, Dr. Underwood.    Abilio Kelly   Surgery PGY2  [phone]

## 2017-02-11 NOTE — H&P
[facility]  Internal Medicine History and Physical    Name: Sohan Rendon MRN#: [id number]   Age: 67 year old YOB: 1950       Assessment and Plan   Sohan Rendon is a 67 year old man with chronic systolic heart failure 2/2 to ischemic CM s/p HM II LVAD placement in [place] now Destination Therapy due to multiple CVA's with residual left sided weakness c/b recurrent hemolysis and pump thrombus, who was admitted for 24 hr history of RLQ abdominal pain found to have CT evidence of non-perforated appendicitis.     #Appendicitis:  Presented with RLQ pain, tenderness at McBurney's point. Afebrile, hemodynamically stable. Normal WBC. CT scan with non-perforated appendicitis. There is also a nonobstructing nephrolithiasis in both kidneys as well.   --Surgery consulted, appreciate recs  --Ertapenem renally dosed per Pharmacy; will attempt conservative management. If this fails, pt agreeable to appendectomy.   --Oxycodone 5mg q4h prn      #Chronic systolic heart failure secondary to ischemic CM:  Stage D, NYHA Class III B. S/p HM II LVAD placement in [place] now DT due to multiple CVAs with residual left sided weakness complicated by recurrent hemolysis and pump thrombus. Focus of care remains on quality of life.    --Not on ACEi/ARB because he did not tolerate it  --LVAD settings: Flow 4.1 lpm, PI 5.7, Speed 8800 rpm, Power 5.2 capps  --Atorvastin 10mg PO daily  --Holding home Lasix 20mg PO daily   --Metoprolol 50mg daily, 25mg qhs   --Losartan 25mg daily   --SCD prophylaxis: ICD  --% BiV pacing:   N/A  --Fluid status: euvolemic. Avoid dehydration  --INR goal: 2.5-3 due to CVAs and pump thrombus.  Coumadin clinic to adjust.  --Antiplatelet agents:   --NSAID use: No    #ICD interrogation: Last interrogated on 1/11 with intrinsic rhythm is 65 bpm. Normal ICD function. No arrhythmias recorded. OptiVol thoracic impedance slightly above baseline.   85%.    #Cough: Evaluated by  ED on 2/7, felt most likely Benzonatate 100mg PO qam  --Discontinue diphenhydramine, initiate Robitussen   --Sputum culture     #HTN: currently controlled, no issues with hypotension.   *Home regimen: Lasix 20mg once daily, Losartan 25mg PO tablet, Metoprolol as above   --Hold lasix and Losartan given HEATH    #Multiple CVAs with residual and recurrent hemolysis/thrombus:  No significant rehab potential.  Continue coumadin and ASA.  Not candidate for pump exchange.  Continue to focus on symptom management.       #History of seizure: Continue Levetiracetam 750mg PO BID     #Cough: Evaluated by ED on 2/7, felt most likely Benzonatate 100mg PO qam    #Gout: Allopurinol 100mg PO daily     #Constipation: Bisocodyl 5mg PO daily prn     #BPH: Finasteride 5mg PO daily, flomax .4mg daily     #Iron Deficiency Anemia: Ferrous sulfate 325mg PO daily with breakfast and Vitamin C  #Insomnia: Meltaonin 1mg qhs    ##Other  - Prophylaxis:   -DVT: Hold warfarin and use high intensity heparin for next 24 hrs until it is more clear whether or not pt will need appendectomy.    -GI:   -Bowel: Senna-docusate and Dulcolax   - FEN: NPO except for meds   - IVF: D5 1/2 NS at 75ml/hr  - Electrolyte Protocol:   - Telemetry: Yes  - Family: Daughters at bedside updated  - Code status: Full Code    Disposition:  Admit to CV ICU with Surgical Consult     Jess Moore  PGY-2  Internal Medicine  [phone]    This patient was staffed with the attending, Dr. Lebron, who agrees with the above assessment and plan.       Chief Complaint     This history was obtained from daughters, chart, and patient with assistance by [place]        History of Present Illness    Sohan Rendon is a 67 year old woman with chronic systolic heart failure 2/2 to ischemic CM s/p HM II LVAD placement in [place] now Destination Therapy due to multiple CVA's with residual left sided weakness c/b recurrent hemolysis and pump thrombus, who was admitted for 24 hr history of  RLQ abdominal pain. Daughter states that it was sudden onset overnight, progressively worsening.     Baseline: Pt lives at home with daughter. Nursing comes in to help. Another daughter comes in to help daily. Pt is dependent for all ADL's after stroke has left him hemiparetic. Unable to stand.     ED Course: Afebrile, hemodynamically stable, no leukocytosis. CXR negative. CT with non-perforated appendicitis. Surgery consulted, recommending initial trial of conservative management.     ROS(+): Productive cough, sick contacts (daughter sick), abdominal pain, pain with eating food.  ROS(-): Fevers, chills, nausea/vomiting. Hematemesis, hematochezia, melena.     -Recent ED visit on 2/7 for productive cough, felt most likely to be viral.   -Last seen by Dr. Lemons on 1/11/16 with interrogation of LVAD and ICD.       Review of Systems   All 12 systems reviewed.  Relevant positives/negatives noted in HPI above        Past Medical History   (Reviewed and updated)  Past Medical History   Diagnosis Date     Esophagitis 12/2012     EGD with esophagitis and multiple douenal ulcers     CHF (congestive heart failure), NYHA class IV (H) 10/9/2012     s/p HeartMate II.  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  Normal carotid duplex 3/2014.       Clostridium difficile colitis 12/2012      Dysphagia      PEG tube placed in 2012.  Subsequently passed swallow eval in 3/2014     Gout      Anemia of  chronic disease      Baseline Hb 8-9     Myocardial infarction (H) 1998     In [facility] without intervention      Ischemic cardiomyopathy      Nonsenile cataract      BE     Fracture of femoral neck, right, closed 2/3/2015     Presumed pathologic as patient is non-weight-bearing and suffered no trauma.  Family declined operative repair during hospital stay 1/23 - 1/30/15.     Gastric and duodenal angiodysplasia with hemorrhage 6/18/2015          Past Surgical History   (Reviewed and updated)  Past Surgical History   Procedure Laterality Date     Insert ventricular assist device left (heartmate ii)  10/9/2012     H insert icd  2/10/2011     And pacemaker.  Not BiV     Repair valve mitral  2/9/2012     28 mm Carbomedics 2/3 ring      C cabg, vein, five  2001     Repair patent foramen ovale  10/9/2012     Ir gastro jejunostomy tube placement       Esophagoscopy, gastroscopy, duodenoscopy (egd), combined N/A 6/10/2015     Procedure: COMBINED ESOPHAGOSCOPY, GASTROSCOPY, DUODENOSCOPY (EGD);  Surgeon: Edu Jenkins MD;  Location: [facility] GI     Esophagoscopy, gastroscopy, duodenoscopy (egd), combined N/A 6/15/2015     Procedure: COMBINED ESOPHAGOSCOPY, GASTROSCOPY, DUODENOSCOPY (EGD);  Surgeon: Yury Bonner MD;  Location: [facility] OR     Phacoemulsification clear cornea with standard intraocular lens implant Right 11/19/2015     Procedure: PHACOEMULSIFICATION CLEAR CORNEA WITH STANDARD INTRAOCULAR LENS IMPLANT;  Surgeon: Violeta Cosme MD;  Location: [facility] OR     Cataract iol, rt/lt Right 11/19/2015         Allergies   (Reviewed and updated)   Allergies   Allergen Reactions     Seasonal Allergies          Medications   (Reviewed and updated)    Current Facility-Administered Medications on File Prior to Encounter:  oxyCODONE (ROXICODONE) 5 MG immediate release tablet     Current Outpatient Prescriptions on File Prior to Encounter:  losartan (COZAAR) 25 MG tablet Take 1 tablet (25 mg) by mouth daily   furosemide (LASIX) 20 MG  tablet Take one tablet by mouth once daily as needed for fluid overload.   allopurinol (ZYLOPRIM) 100 MG tablet Take 1 tablet (100 mg) by mouth daily   atorvastatin (LIPITOR) 10 MG tablet Take 1 tablet (10 mg) by mouth daily   order for DME Equipment being ordered: Wheelchair, manual, with elevated leg rests and tilt in space back.  Please fax to [facility]; I called them 11/26/16 and they requested the new order.  Face to face is in my 10/26/16 note.   pantoprazole (PROTONIX) 40 MG enteric coated tablet Take 1 tablet (40 mg) by mouth daily   warfarin (COUMADIN) 3 MG tablet 6 mg Sat and Thurs and 4.5 mg the rest of the week. Recommend repeat INR 9/21 per [facility] RN.   finasteride (PROSCAR) 5 MG tablet Take 1 tablet (5 mg) by mouth daily   tamsulosin (FLOMAX) 0.4 MG 24 hr capsule Take 1 capsule (0.4 mg) by mouth daily   senna-docusate (SENOKOT-S;PERICOLACE) 8.6-50 MG per tablet Take 1-2 tablets by mouth 2 times daily as needed for constipation   order for DME Equipment being ordered: Wheelchair, manual.   order for DME Equipment being ordered: Hospital Bed, fully electric.   azelastine (OPTIVAR) 0.05 % SOLN Apply 1 drop to eye 2 times daily   loratadine (CLARITIN) 10 MG tablet Take 1 tablet (10 mg) by mouth daily   levETIRAcetam (KEPPRA) 750 MG tablet Take 1 tablet (750 mg) by mouth 2 times daily   ferrous sulfate (IRON) 325 (65 FE) MG tablet Take 1 tablet (325 mg) by mouth daily (with breakfast)   ascorbic acid (VITAMIN C) 500 MG tablet Take 1 tablet (500 mg) by mouth daily With iron pill   metoprolol (TOPROL-XL) 25 MG 24 hr tablet Take 1 tablet (25 mg) by mouth every evening   metoprolol (TOPROL-XL) 50 MG 24 hr tablet Take 1 tablet (50 mg) by mouth daily   blood glucose (NO BRAND SPECIFIED) lancets standard Use to test blood sugar 2 times daily or as directed.   blood glucose monitoring (NO BRAND SPECIFIED) test strip Use to test blood sugar 2 times daily or as directed.   simethicone (MYLICON) 80 MG chewable tablet Take  1 tablet (80 mg) by mouth 4 times daily   order for DME Equipment being ordered: Reclining manual w/c with bilateral elevating leg rests.   nystatin (MYCOSTATIN) 237867 UNIT/GM POWD Apply to affected areas of skin in the groin and on the scrotum three times a day as needed.   BD SHARPS CONTAINER HOME MISC 1 each as needed   carboxymethylcellulose (REFRESH PLUS) 0.5 % SOLN Place 1 drop into the right eye 3 times daily as needed for other (eye irritation or discomfort.)   order for DME Equipment being ordered: Bilateral leg supports for manual wheelchair.   Thiamine HCl (VITAMIN B-1) 100 MG TABS Take 1 tablet (100 mg) by mouth daily   olopatadine (PATANOL) 0.1 % ophthalmic solution Place 1 drop into both eyes 2 times daily   nitroglycerin (NITROSTAT) 0.4 MG SL tablet Place 1 tablet (0.4 mg) under the tongue every 5 minutes as needed for chest pain   calcium carbonate-vitamin D 500-400 MG-UNIT TABS tablt Take 1 tablet by mouth 2 times daily (Patient taking differently: Take 1 tablet by mouth daily )   blood glucose monitoring (NO BRAND SPECIFIED) lancets Use to test blood sugars 2 times daily or as directed.   benzonatate (TESSALON) 100 MG capsule Take 1 capsule (100 mg) by mouth 3 times daily as needed for cough   ORDER FOR DME Equipment being ordered: Lift chair.Please see indications and face-to-face encounter details in 2/3/15 Office Visit note.   acetaminophen (TYLENOL) 325 MG tablet Take 2 tablets (650 mg) by mouth every 6 hours (Patient taking differently: Take 650 mg by mouth as needed )   melatonin 1 MG TABS Take 1 tablet (1 mg) by mouth At Bedtime   bisacodyl (DULCOLAX) 5 MG EC tablet Take 1 tablet (5 mg) by mouth daily as needed for constipation   diphenhydrAMINE HCl 12.5 MG/5ML SYRP Take 50 mg by mouth nightly as needed (Cough)   oxyCODONE (ROXICODONE) 5 MG immediate release tablet Take 1 tablet (5 mg) by mouth every 4 hours as needed for moderate to severe pain         Family History   (Reviewed and  "updated)  Family History   Problem Relation Age of Onset     Family History Negative No family hx of      Glaucoma No family hx of      Macular Degeneration No family hx of      CANCER No family hx of      no skin cancer         Social History   (Reviewed and updated)  Social History   Substance Use Topics     Smoking status: Former Smoker     Smokeless tobacco: Former User     Alcohol Use: No         Physical Exam   Vitals: Blood pressure 97/83, pulse 64, temperature 98.7  F (37.1  C), temperature source Oral, resp. rate 18, height 1.727 m (5' 8\"), weight 44.679 kg (98 lb 8 oz), SpO2 94 %.  Gen: [place] man, chronically ill appearing, Resting comfortably, in no acute distress  Head: Normocephalic, atraumatic   Eyes: SUZANNE, EOMI   ENT: MMM, No sinus tenderness, oropharynx clear with no erythema or exudates, no LAD, neck supple, no JVD    CV: LVAD hum, drive line covered with bandage    Resp: Non-labored breathing on Room air, CTAB with no wheezing or rhonchi   Abd: Two scars in center of abdomen (per daughters, reportedly from prior feeding tube) +BS, soft, NTND, no costovertebral angle tenderness, no peritoneal signs   Ext: Warm, well-perfused, no edema      Data   Labs:  BMP  Recent Labs  Lab 02/11/17  0651 02/08/17  0016    137   POTASSIUM 4.2 3.8   CHLORIDE 106 104   JOSÉ 8.8 8.4*   CO2 24 26   BUN 27 21   CR 1.91* 2.16*   * 200*     CBC  Recent Labs  Lab 02/11/17  0651 02/08/17  0016   WBC 7.3 5.7   RBC 4.72 4.49   HGB 15.0 14.2   HCT 44.9 43.0   MCV 95 96   MCH 31.8 31.6   MCHC 33.4 33.0   RDW 16.3* 16.8*   * 125*     INR  Recent Labs  Lab 02/11/17  0651 02/08/17  0016 02/06/17   INR 1.70* 2.34* 2.20     LFTs  Recent Labs  Lab 02/11/17  0651   ALKPHOS 115   AST 52*   ALT 26   BILITOTAL 0.8   PROTTOTAL 7.5   ALBUMIN 3.5      EKG:  Ventricularly paced with PVC's    Microbiology:  None    Imaging  All images reviewed.  Relevant imaging noted below    2/11/17 CXR  Impression:    1. No acute " cardiopulmonary abnormality.  2. Implantable cardiac defibrillator and LVAD are unchanged.        I have reviewed today's vital signs, notes, medications, labs and imaging.  I have also seen and examined the patient and agree with the findings and plan as outlined above.  Pt well known to me and briefly he is a 66 yo male with ischemic DCM supported by HMII LVAD placed in [place] (2013) for DT with course complicated by multiple CVAs with residual left sided weakness.  Pt admitted with 24 hr hx of abd discomfort without fevers, chills, NS.  Denies Nausea and Emesis.  Reports taking all his medications.  Pt accompanied by his daughter and hx obtained via .  Afebrile with  and BP 97/83. Lungs clear and mechanical LVAD hum.  Abd benign.  Labs with Cr elevated mildly at 1.91 and WBC 10.5.  Abd CT c/w appendicitis.  Assessment: Pt with endstage heart failure with CT c/w appendicitis.  Gen Surg saw pt and recommended conservative management for now with iv abx.  Will keep pt npo except for meds and provide iv fluids. Heparin for anticoagulation and will hold coumadin.      Jaleel Lebron MD, PhD  Professor, Heart Failure and Cardiac Transplantation  [facility]

## 2017-02-11 NOTE — ED PROVIDER NOTES
History     Chief Complaint   Patient presents with     Abdominal Pain     HPI  Sohan Rendon is a 67 year old male with a history of congestive heart failure, stroke, mitral regurg status post mitral valve replacement, latent TB, hypertension, BPH, GERD, CKD, MI, and ischemic cardiomyopathy who presents with a history of right lower quadrant abdominal pain starting earlier today.  Patient is not nauseated.  He has no fever or chills.  The pain is localized to right lower quadrant without radiation elsewhere.  He describes a kind of a discomfort and ache.  This is just below where his LVAD line adjacent to where it enters the abdominal cavity.  The patient denies any chills or sweats.  Last bowel movement February 9th, yesterday was normal.  He still urinating without difficulty.  Patient has no other ongoing complaints other than just the pain.  Notices that when he gets up and moves around it also is uncomfortable.  Recently evaluated in the emergency room February 7th for URI and was found on chest x-ray at that time to have no evidence of any pulmonary edema, pleural effusion or a focal airspace opacity.  He had stable cardiomegaly.  Patient currently at this point has stable vital signs.    I have reviewed the Medications, Allergies, Past Medical and Surgical History, and Social History in the [facility] system.    PAST MEDICAL HISTORY:   Past Medical History   Diagnosis Date     Acute right MCA stroke (H) 6/2013     With L sided hemiparesis      Anemia of chronic disease      Baseline Hb 8-9     BPH (benign prostatic hyperplasia)      CHF (congestive heart failure), NYHA class IV (H) 10/9/2012     s/p HeartMate II.  Was on milrinone and dobutamine prior to LVAD      CKD (chronic kidney disease)      Baseline Cr=     Clostridium difficile colitis 12/2012      Dysphagia      PEG tube placed in 2012.  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  Normal carotid duplex 3/2014.       Esophagitis 12/2012     EGD with esophagitis and multiple douenal ulcers     Fracture of femoral neck, right, closed 2/3/2015     Presumed pathologic as patient is non-weight-bearing and suffered no trauma.  Family declined operative repair during hospital stay 1/23 - 1/30/15.     Gastric and duodenal angiodysplasia with hemorrhage 6/18/2015     GERD (gastroesophageal reflux disease)      Gout      HTN (hypertension)      LDL=59 (3/29/2014)     Hyperlipaemia      Ischemic cardiomyopathy      Mitral regurgitation      s/p MVR with Carbomedics ring      Myocardial infarction (H) 1998     In [facility] without intervention      Nonsenile cataract      BE     PFO (patent foramen ovale)      s/p closure (10/9/2012)     TB lung, latent      s/p 9 months INH in 2012        PAST SURGICAL HISTORY:   Past Surgical History   Procedure Laterality Date     Insert ventricular assist device left (heartmate ii)  10/9/2012     H insert icd  2/10/2011     And pacemaker.  Not BiV     Repair valve mitral  2/9/2012     28 mm Carbomedics 2/3 ring      C cabg, vein, five  2001     Repair patent foramen ovale  10/9/2012     Ir gastro jejunostomy tube placement       Esophagoscopy, gastroscopy, duodenoscopy (egd), combined N/A 6/10/2015     Procedure: COMBINED ESOPHAGOSCOPY, GASTROSCOPY, DUODENOSCOPY (EGD);  Surgeon: Edu Jenkins MD;  Location:  GI     Esophagoscopy, gastroscopy, duodenoscopy (egd), combined N/A 6/15/2015     Procedure: COMBINED ESOPHAGOSCOPY, GASTROSCOPY, DUODENOSCOPY (EGD);  Surgeon: Yury Bonner MD;  Location: [facility] OR     Phacoemulsification clear cornea with standard intraocular lens implant Right 11/19/2015     Procedure: PHACOEMULSIFICATION CLEAR CORNEA WITH STANDARD INTRAOCULAR LENS IMPLANT;  Surgeon: Violeta Cosme MD;  Location: [facility] OR     Cataract iol, rt/lt Right 11/19/2015       FAMILY HISTORY:   Family History   Problem Relation Age of  Onset     Family History Negative No family hx of      Glaucoma No family hx of      Macular Degeneration No family hx of      CANCER No family hx of      no skin cancer       SOCIAL HISTORY:   Social History   Substance Use Topics     Smoking status: Former Smoker     Smokeless tobacco: Former User     Alcohol use No       Current Facility-Administered Medications   Medication     allopurinol (ZYLOPRIM) tablet 100 mg     ascorbic acid (VITAMIN C) tablet 500 mg     acetaminophen (TYLENOL) tablet 650 mg     atorvastatin (LIPITOR) tablet 10 mg     azelastine (OPTIVAR) ophthalmic solution 1 drop     carboxymethylcellulose (REFRESH PLUS) 0.5 % ophthalmic solution 1 drop     ferrous sulfate (IRON) tablet 325 mg     finasteride (PROSCAR) tablet 5 mg     levETIRAcetam (KEPPRA) tablet 750 mg     melatonin tablet 1 mg     metoprolol (TOPROL-XL) 24 hr tablet 25 mg     nitroglycerin (NITROSTAT) sublingual tablet 0.4 mg     nystatin (MYCOSTATIN) topical powder     pantoprazole (PROTONIX) EC tablet 40 mg     senna-docusate (SENOKOT-S;PERICOLACE) 8.6-50 MG per tablet 1-2 tablet     simethicone (MYLICON) chewable tablet 80 mg     tamsulosin (FLOMAX) capsule 0.4 mg     vitamin  B-1 tablet 100 mg     oxyCODONE (ROXICODONE) IR tablet 5 mg     olopatadine (PATANOL) 0.1 % ophthalmic solution 1 drop     naloxone (NARCAN) injection 0.1-0.4 mg     Warfarin Therapy Reminder (Check START DATE - warfarin may be starting in the FUTURE)     heparin  drip 25,000 units in 0.45% NaCl 250 mL (see additional administration details for dose)     heparin bolus from infusion pump     bisacodyl (DULCOLAX) Suppository 10 mg     senna-docusate (SENOKOT-S;PERICOLACE) 8.6-50 MG per tablet 1 tablet     ertapenem (INVanz) 500 mg in NaCl 0.9 % 50 mL intermittent infusion     guaiFENesin-dextromethorphan (ROBITUSSIN DM) 100-10 MG/5ML syrup 5 mL     bisacodyl (DULCOLAX) EC tablet 5 mg     diphenhydrAMINE (BENADRYL) solution 50 mg     Facility-Administered  "Medications Ordered in Other Encounters   Medication     oxyCODONE (ROXICODONE) 5 MG immediate release tablet        Allergies   Allergen Reactions     Seasonal Allergies          Review of Systems   Constitutional: Negative for fever and chills.   HENT: Negative for congestion.    Eyes: Negative for redness.   Respiratory: Negative for shortness of breath.    Cardiovascular: Negative for chest pain.   Gastrointestinal: Positive for abdominal pain. Negative for nausea.   Genitourinary: Negative for difficulty urinating.   Musculoskeletal: Negative for arthralgias and neck stiffness.   Skin: Negative for color change.   Neurological: Negative for headaches.   Psychiatric/Behavioral: Negative for confusion.   All other systems reviewed and are negative.      Physical Exam   BP: (!) 121/100 mmHg  Pulse: 73  Heart Rate: 69  Temp: 98.5  F (36.9  C)  Resp: 18  Height: 172.7 cm (5' 8\")  Weight: 77.111 kg (170 lb)  SpO2: 99 %  Physical Exam   Constitutional: He appears well-developed. No distress.   HENT:   Head: Normocephalic and atraumatic.   Mouth/Throat: Oropharynx is clear and moist. No oropharyngeal exudate.   Eyes: Pupils are equal, round, and reactive to light. No scleral icterus.   Neck: Normal range of motion.   Cardiovascular: Normal rate, regular rhythm, normal heart sounds and intact distal pulses.    Pulmonary/Chest: Effort normal and breath sounds normal. No respiratory distress.   Abdominal: Soft. Bowel sounds are normal. There is tenderness in the right lower quadrant. There is guarding and tenderness at McBurney's point. There is no rigidity, no rebound and no CVA tenderness.   Musculoskeletal: He exhibits no edema or tenderness.   Lymphadenopathy:     He has no cervical adenopathy.   Skin: Skin is warm. No rash noted. He is not diaphoretic.       ED Course     Procedures             EKG Interpretation:      Interpreted by Dr. Jonah Horne  Time reviewed: 6:25:51  Symptoms at time of EKG: Abdominal " pain  Rhythm: ventricular- paced rhythm with occasional and consecutive premature ventricular complexes  Rate: 66 bpm  Axis: Normal  Ectopy: none  Conduction: normal  ST Segments/ T Waves: No ST-T wave changes  Q Waves: none  Comparison to prior: Unchanged from 09/14/2016    Clinical Impression: no acute changes                 Critical Care time:  none               Labs Ordered and Resulted from Time of ED Arrival Up to the Time of Departure from the ED   COMPREHENSIVE METABOLIC PANEL - Abnormal; Notable for the following:        Result Value    Glucose 155 (*)     Creatinine 1.91 (*)     GFR Estimate 35 (*)     GFR Estimate If Black 43 (*)     AST 52 (*)     All other components within normal limits   CBC WITH PLATELETS DIFFERENTIAL - Abnormal; Notable for the following:     RDW 16.3 (*)     Platelet Count 124 (*)     All other components within normal limits   LACTATE DEHYDROGENASE - Abnormal; Notable for the following:     Lactate Dehydrogenase 943 (*)     All other components within normal limits   INR - Abnormal; Notable for the following:     INR 1.70 (*)     All other components within normal limits   ROUTINE UA WITH MICROSCOPIC REFLEX TO CULTURE - Abnormal; Notable for the following:     Glucose Urine 30 (*)     Blood Urine Small (*)     All other components within normal limits   LACTIC ACID WHOLE BLOOD   TROPONIN I       Assessments & Plan (with Medical Decision Making)   1.  Appendicitis 2.  LVAD 3. No known history of chronic kidney disease    Patient is a 67-year-old male with a known history of congestive heart failure who has LVAD in place.  Known history of chronic kidney disease and hypertension.  Patient presents with right lower quadrant pain to our ER.  Upon exam he was quite tender at McBurney s point.  White count was normal. CT scan was performed showing a nonperforated appendicitis.  There is a nonobstructing nephrolithiasis, both kidneys noted as well.  Patient s labs including a lactate  and troponin were normal.  His INR is 1.7.  LDH is 943 which has been at his baseline for his LVAD.  Surgery did come down to evaluate the patient and recommended ertapenem 1 g IV.  Currently the patient and family are reluctant to have the appendix removed and given the context of his LVAD surgery felt it was appropriate to use antibiotic therapy and admitted him to cardiology.  Spoke to Dr. Dipesh Suarez cardiology heart failure service and they will accept the patient.  I went over the clinical exam findings and lab and x-ray results.  This part of the document was transcribed by Karina Jones Medical Scribe.       I have reviewed the nursing notes.    I have reviewed the findings, diagnosis, plan and need for follow up with the patient.    Current Discharge Medication List          Final diagnoses:   Appendicitis   LVAD (left ventricular assist device) present       2/11/2017   [facility], [place], EMERGENCY DEPARTMENT       Jonah Horne MD  02/12/17 [id number]

## 2017-02-11 NOTE — LETTER
Transition Communication Hand-off for Care Transitions to Next Level of Care Provider    Name: Sohan Rendon  MRN #: [id number]  Primary Care Provider: Thomas Dill     Primary Clinic: [facility] [address] [place] [address]     [facility] [id number]     Reason for Hospitalization:  Appendicitis [K37]  Admit Date/Time: 2/11/2017  6:23 AM  Discharge Date: 2/15/17  Payor Source: Payor: MEDICARE / Plan: MEDICARE / Product Type: Medicare /     Readmission Assessment Measure (LEANDRO) Risk Score/category:  high      Reason for Communication Hand-off Referral: Multiple providers/specialties    Discharge Plan: resumption of home care, [facility] for INR and Warfarin.      Concern for non-adherence with plan of care:   yes  Discharge Needs Assessment:  Needs       Most Recent Value    Transportation Available Medicaid transportation    Interventions provided [facility],  Home Care, [facility] transport    Other Resources Other (see comment) [[facility]: [phone]]    Transportation Agency -- [[employer]: [phone]]    Home Care [facility] [phone], Fax: [phone]    Other -- [[facility]: [phone]]        Follow-up specialty is recommended: Yes    Follow-up plan:  Future Appointments  Date Time Provider Department Center                 3/14/2017 2:00 PM Thomas Dill MD [facility]   5/11/2017 10:00 AM [id number] [facility]   5/11/2017 11:00 AM UC LAB [facility]   5/11/2017 11:30 AM 1, Uc Cv Device [facility]   5/11/2017 12:00 PM Maureen Grant, HONEY [facility]       Any outstanding tests or procedures:        Referrals     Future Labs/Procedures    General Surg Adult Referral     Comments:    Follow up in clinic with Dr. Meyer in clinic in 4-6 weeks.    Home care nursing referral     Comments:    [facility]   Phone: [phone]   Fax: [phone]    For [facility] steiner post  hospitalization.  Palliative Care Program.   Resumption of previous home care orders.   Assess: vital signs, respiratory and cardiac status, activity tolerance, hydration, nutritional status.   Med set up and management, lab draws.   INR lab draw with results to the [facility], next INR lab draw due on 2/17/17.     Your provider has ordered home care nursing services. If you have not been contacted within 2 days of your discharge please call the inpatient department phone number at [phone] .    Medication Therapy Management Referral     Comments:    Reason for referral:  on more than 5 medications and managing chronic disease and on more than 10 medications and hospitalized or in the ED in the past 6 months     This service is designed to help you get the most from your medications.  A specially trained pharmacist will work closely with you and your doctors  to solve any problems related to your medications and to help you get the   best results from taking them.      The Medication Therapy Management staff will call you to schedule an appointment.            Key Recommendations:  Post hospitalization follow up.     JAYLEN KLEIN RN CC    AVS/Discharge Summary is the source of truth; this is a helpful guide for improved communication of patient story

## 2017-02-11 NOTE — ED NOTES
Pt biba with c/o abdominal pain (RLQ) and chest pain that started last night. Pt's daughter in room. Pt has LVAD heartmate 2. And pacemaker as well. Denies SOA and or any N/V/D.  Pt last BM 2/9/17. VSS and afebrile.

## 2017-02-11 NOTE — IP AVS SNAPSHOT
MRN:[id number]                      After Visit Summary   2/11/2017    Sohan Rendon    MRN: [id number]           Thank you!     Thank you for choosing [facility] for your care. Our goal is always to provide you with excellent care. Hearing back from our patients is one way we can continue to improve our services. Please take a few minutes to complete the written survey that you may receive in the mail after you visit with us. Thank you!        Patient Information     Date Of Birth          1950        About your hospital stay     You were admitted on:  February 11, 2017 You last received care in the:  [facility] [facility]    You were discharged on:  February 15, 2017        Reason for your hospital stay       You were hospitalized due to inflammation and infection of your appendix (an organ in your abdomen). You were started on IV Antibiotics with improvement in your symptoms.                  Who to Call     For medical emergencies, please call 911.  For non-urgent questions about your medical care, please call your primary care provider or clinic, [phone]          Attending Provider     Provider Specialty    Coleen, Jonah GUTIERREZ MD Emergency Medicine    BernardinoJaleel MD Cardiology       Primary Care Provider Office Phone # Fax #    Thomas Dill [phone]        [facility] [address] [place] [id number]         When to contact your care team       Call your primary doctor if you have any of the following: temperature greater than 100.6F, increased shortness of breath, chest pain, or increased abdominal pain. Also let your doctor know if you are having sudden nausea/vomiting and cannot eat food.                  After Care Instructions     Activity       Your activity upon discharge: activity as tolerated            Diet       Follow this diet upon discharge: Orders Placed This Encounter      Regular Diet Adult                   Follow-up Appointments     Adult [facility] Follow-up and recommended labs and tests       Follow up with primary care provider, Thomas Dill, within 7 days for hospital follow- up.  The following labs/tests are recommended: CBC, BMP, and restarting of lasix 20mg IV if volume status and kidney function allows.    Follow-up with General Surgery in 1 month.       Appointments on [place] and/or [facility] (with [facility] or [facility] provider or service). Call [phone] if you haven't heard regarding these appointments within 7 days of discharge.                  Your next 10 appointments already scheduled     Feb 16, 2017  9:30 AM [facility] Inpatient with Jordi Lopez    Services Department ([facility])    [address] [place] [id number]               Feb 17, 2017  9:00 AM [facility] Inpatient with Serge Nayak    Services Department ([facility])    [address] [place] [id number]               Mar 14, 2017  2:00 PM CDT   Return Visit with Thomas Dill MD   [facility] ([facility])    [address] [place] [address]  [address]  [facility] [address] [phone]            May 11, 2017 10:00 AM CDT   Ech Complete Lvad* with UCECHCR2    Health Echo ([facility])    [address] [place] [address]  [address] [facility] [id number] [phone]           1.  Please bring or wear a comfortable two-piece outfit. 2.  You may eat, drink and take your normal medicines. 3.  For any questions that cannot be answered, please contact the ordering physician            May 11, 2017 11:00 AM CDT   Lab with  LAB    Health Lab ([facility])    [address] [place] [address]  1st Floor  [facility] [id number] [phone]            May 11, 2017 11:30 AM CDT   (Arrive by 11:15 AM)   Implanted  Defibulator with Uc Cv Device 1   [facility] ([facility])    [address] [facility]  3rd [facility] [address] [phone]            May 11, 2017 12:00 PM CDT   (Arrive by 11:45 AM)   Ventricular Assist Device with Maureen Grant NP   [facility] ([facility])    [address] [facility]  3rd [facility] [address] [phone]              Additional Services     General Surg Adult Referral       Follow up in clinic with Dr. Meyer in clinic in 4-6 weeks.            Home care nursing referral       [facility]   Phone: [phone]   Fax: [phone]    For RN obdulia post hospitalization.  Palliative Care Program.   Resumption of previous home care orders.   Assess: vital signs, respiratory and cardiac status, activity tolerance, hydration, nutritional status.   Med set up and management, lab draws.   INR lab draw with results to the [facility], next INR lab draw due on 2/17/17.     Your provider has ordered home care nursing services. If you have not been contacted within 2 days of your discharge please call the inpatient department phone number at [phone] .            Medication Therapy Management Referral       Reason for referral:  on more than 5 medications and managing chronic disease and on more than 10 medications and hospitalized or in the ED in the past 6 months     This service is designed to help you get the most from your medications.  A specially trained pharmacist will work closely with you and your doctors  to solve any problems related to your medications and to help you get the   best results from taking them.      The Medication Therapy Management staff will call you to schedule an appointment.                  Pending Results     No orders found from 2/9/2017 to 2/12/2017.            Statement of Approval     Ordered          02/15/17 1230  I have reviewed and agree with  "all the recommendations and orders detailed in this document.  EFFECTIVE NOW     Approved and electronically signed by:  Jess Moore MD             Admission Information     Date & Time Provider Department Dept. Phone    2017 Jaleel Lebron MD [address] [facility] [phone]      Your Vitals Were     Blood Pressure Pulse Temperature Respirations Height Weight    111/82 (BP Location: Right arm) 61 97.6  F (36.4  C) (Oral) 12 1.727 m (5' 8\") 44.7 kg (98 lb 8 oz)    Pulse Oximetry BMI (Body Mass Index)                96% 14.98 kg/m2          MyChart Information     One On One lets you send messages to your doctor, view your test results, renew your prescriptions, schedule appointments and more. To sign up, go to [email or web address] . Click on \"Log in\" on the left side of the screen, which will take you to the Welcome page. Then click on \"Sign up Now\" on the right side of the page.     You will be asked to enter the access code listed below, as well as some personal information. Please follow the directions to create your username and password.     Your access code is: [email or web address]  Expires: 2017  6:30 AM     Your access code will  in 90 days. If you need help or a new code, please call your [place] clinic or [phone].        Care EveryWhere ID     This is your Care EveryWhere ID. This could be used by other organizations to access your [place] medical records  [id number]           Review of your medicines      START taking        Dose / Directions    guaiFENesin-dextromethorphan 100-10 MG/5ML syrup   Commonly known as:  ROBITUSSIN DM   Used for:  Chronic cough        Dose:  5 mL   Take 5 mLs by mouth every 4 hours as needed for cough   Quantity:  560 mL   Refills:  3       levofloxacin 750 MG tablet   Commonly known as:  LEVAQUIN   Indication:  Infection Within the Abdomen, For appenditicitis   Used for:  Acute appendicitis, unspecified acute appendicitis type        Dose:  " 750 mg   Take 1 tablet (750 mg) by mouth every 48 hours for 6 days   Quantity:  3 tablet   Refills:  0       metroNIDAZOLE 500 MG tablet   Commonly known as:  FLAGYL   Indication:  Infection Within the Abdomen, appendicitis   Used for:  Acute appendicitis, unspecified acute appendicitis type        Dose:  500 mg   Take 1 tablet (500 mg) by mouth every 8 hours for 6 days   Quantity:  18 tablet   Refills:  0         CONTINUE these medicines which may have CHANGED, or have new prescriptions. If we are uncertain of the size of tablets/capsules you have at home, strength may be listed as something that might have changed.        Dose / Directions    acetaminophen 325 MG tablet   Commonly known as:  TYLENOL   This may have changed:    - when to take this  - reasons to take this   Used for:  Femoral neck fracture, right, closed, initial encounter        Dose:  650 mg   Take 2 tablets (650 mg) by mouth every 6 hours   Quantity:  100 tablet   Refills:  0       azelastine 0.05 % Soln ophthalmic solution   Commonly known as:  OPTIVAR   This may have changed:    - when to take this  - reasons to take this        Dose:  1 drop   Apply 1 drop to eye 2 times daily   Quantity:  1 Bottle   Refills:  11       calcium carbonate-vitamin D 500-400 MG-UNIT Tabs per tablet   This may have changed:  when to take this   Used for:  Fracture of femoral neck, right, closed, initial encounter        Dose:  1 tablet   Take 1 tablet by mouth 2 times daily   Quantity:  180 tablet   Refills:  3         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, unspecified site        Dose:  100 mg   Take 1 tablet (100 mg) by mouth daily   Quantity:  90 tablet   Refills:  3       ascorbic acid 500 MG tablet   Commonly known as:  VITAMIN C        Dose:  500 mg   Take 1 tablet (500 mg) by mouth daily With iron pill    Quantity:  90 tablet   Refills:  3       atorvastatin 10 MG tablet   Commonly known as:  LIPITOR   Used for:  Hyperlipidemia LDL goal <100        Dose:  10 mg   Take 1 tablet (10 mg) by mouth daily   Quantity:  30 tablet   Refills:  5       BD SHARPS CONTAINER HOME Misc   Used for:  Type 2 diabetes mellitus with diabetic chronic kidney disease (H)        Dose:  1 each   1 each as needed   Quantity:  1 each   Refills:  1       bisacodyl 5 MG EC tablet   Commonly known as:  DULCOLAX   Used for:  Constipation        Dose:  5 mg   Take 1 tablet (5 mg) by mouth daily as needed for constipation   Quantity:  20 tablet   Refills:  1       blood glucose lancets standard   Commonly known as:  no brand specified   Used for:  Type 2 diabetes mellitus with stage 3 chronic kidney disease (H)        Use to test blood sugar 2 times daily or as directed.   Quantity:  1 Box   Refills:  11       blood glucose monitoring lancets   Used for:  Diabetes mellitus, type 2 (H)        Use to test blood sugars 2 times daily or as directed.   Quantity:  1 Box   Refills:  3       blood glucose monitoring test strip   Commonly known as:  no brand specified   Used for:  Type 2 diabetes mellitus with stage 3 chronic kidney disease (H)        Use to test blood sugar 2 times daily or as directed.   Quantity:  1 Box   Refills:  3       carboxymethylcellulose 0.5 % Soln ophthalmic solution   Commonly known as:  REFRESH PLUS   Used for:  Dry eye syndrome of bilateral lacrimal glands        Dose:  1 drop   Place 1 drop into the right eye 3 times daily as needed for other (eye irritation or discomfort.)   Quantity:  30 mL   Refills:  3       ferrous sulfate 325 (65 FE) MG tablet   Commonly known as:  IRON        Dose:  325 mg   Take 1 tablet (325 mg) by mouth daily (with breakfast)   Quantity:  90 tablet   Refills:  3       finasteride 5 MG tablet   Commonly known as:  PROSCAR   Used for:  Benign non-nodular prostatic hyperplasia without lower urinary  tract symptoms        Dose:  5 mg   Take 1 tablet (5 mg) by mouth daily   Quantity:  30 tablet   Refills:  0       levETIRAcetam 750 MG tablet   Commonly known as:  KEPPRA   Used for:  Convulsions, unspecified convulsion type (H)        Dose:  750 mg   Take 1 tablet (750 mg) by mouth 2 times daily   Quantity:  180 tablet   Refills:  3       loratadine 10 MG tablet   Commonly known as:  CLARITIN   Used for:  Allergic rhinitis, unspecified allergic rhinitis type        Dose:  10 mg   Take 1 tablet (10 mg) by mouth daily   Quantity:  90 tablet   Refills:  3       losartan 25 MG tablet   Commonly known as:  COZAAR   Used for:  CHF (congestive heart failure), NYHA class IV, chronic, systolic (H)        Dose:  25 mg   Take 1 tablet (25 mg) by mouth daily   Quantity:  45 tablet   Refills:  3       melatonin 1 MG Tabs tablet   Used for:  Insomnia        Dose:  1 mg   Take 1 tablet (1 mg) by mouth At Bedtime   Quantity:  30 tablet   Refills:  0       * metoprolol 25 MG 24 hr tablet   Commonly known as:  TOPROL-XL   Used for:  LVAD (left ventricular assist device) present (H)        Dose:  25 mg   Take 1 tablet (25 mg) by mouth every evening   Quantity:  90 tablet   Refills:  3       * metoprolol 50 MG 24 hr tablet   Commonly known as:  TOPROL-XL        Dose:  50 mg   Take 1 tablet (50 mg) by mouth daily   Quantity:  90 tablet   Refills:  3       nitroglycerin 0.4 MG sublingual tablet   Commonly known as:  NITROSTAT   Used for:  Coronary artery disease involving native coronary artery with unstable angina pectoris (H)        Dose:  0.4 mg   Place 1 tablet (0.4 mg) under the tongue every 5 minutes as needed for chest pain   Quantity:  30 tablet   Refills:  1       nystatin [id number] UNIT/GM Powd   Commonly known as:  MYCOSTATIN        Apply to affected areas of skin in the groin and on the scrotum three times a day as needed.   Quantity:  60 g   Refills:  3       olopatadine 0.1 % ophthalmic solution   Commonly known as:  PATANOL    Used for:  Allergic conjunctivitis of both eyes        Dose:  1 drop   Place 1 drop into both eyes 2 times daily   Quantity:  1 Bottle   Refills:  11       * order for DME   Used for:  Fracture of femoral neck, right, closed, initial encounter, Stroke (H), CHF (congestive heart failure), NYHA class IV (H)        Equipment being ordered: Lift chair.  Please see indications and face-to-face encounter details in 2/3/15 Office Visit note.   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   Quantity:  1 each   Refills:  0       * order for DME   Used for:  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), Displaced fracture of right femoral neck, closed, with nonunion, subsequent encounter        Equipment being ordered: Wheelchair, manual, with elevated leg rests and  tilt in space back.  Please fax to [facility]; I called them 11/26/16 and they requested the new order.  Face to face is in my 10/26/16 note.   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, subsequent encounter        Dose:  5 mg   Take 1 tablet (5 mg) by mouth every 4 hours as needed for moderate to severe pain   Quantity:  90 tablet   Refills:  0       pantoprazole 40 MG EC tablet   Commonly known as:  PROTONIX   Used for:  Gastrointestinal hemorrhage associated with chronic superficial gastritis        Dose:  40 mg   Take 1 tablet (40 mg) by mouth daily   Quantity:  180 tablet   Refills:  3       senna-docusate 8.6-50 MG per tablet   Commonly known as:  SENOKOT-S;PERICOLACE   Used for:  Slow transit constipation        Dose:  1-2 tablet   Take 1-2 tablets by mouth 2 times daily as needed for constipation   Quantity:  60 tablet   Refills:  3       simethicone 80 MG chewable tablet   Commonly known as:  MYLICON   Used for:  Slow transit constipation        Dose:  80 mg   Take 1 tablet (80 mg) by mouth 4 times daily   Quantity:  180 tablet   Refills:  5       tamsulosin 0.4 MG capsule   Commonly known as:  FLOMAX   Used for:  Incomplete bladder emptying        Dose:  0.4 mg   Take 1 capsule (0.4 mg) by mouth daily   Quantity:  90 capsule   Refills:  3       Vitamin B-1 100 MG Tabs   Used for:  Cerebrovascular accident (CVA) due to embolism of right middle cerebral artery (H)        Dose:  100 mg   Take 1 tablet (100 mg) by mouth daily   Quantity:  90 tablet   Refills:  3       warfarin 3 MG tablet   Commonly known as:  COUMADIN   Used for:  Cerebrovascular accident (CVA) due to embolism of right middle cerebral artery (H)        Take 3mg by mouth on Mondays and Thursdays. Take 4.5mg by mouth all other days of the week.   Quantity:  180 tablet   Refills:  3       * Notice:  This list has 8 medication(s)  that are the same as other medications prescribed for you. Read the directions carefully, and ask your doctor or other care provider to review them with you.      STOP taking     benzonatate 100 MG capsule   Commonly known as:  TESSALON           diphenhydrAMINE HCl 12.5 MG/5ML Syrp           furosemide 20 MG tablet   Commonly known as:  LASIX                Where to get your medicines      These medications were sent to [facility], located within [facility] [place], [place] - [address] [facility] [address] [facility]  [address] [facility] [address]     Phone:  [phone]     guaiFENesin-dextromethorphan 100-10 MG/5ML syrup    levofloxacin 750 MG tablet    metroNIDAZOLE 500 MG tablet                Protect others around you: Learn how to safely use, store and throw away your medicines at www.disposemymeds.org.             Medication List: This is a list of all your medications and when to take them. Check marks below indicate your daily home schedule. Keep this list as a reference.      Medications           Morning Afternoon Evening Bedtime As Needed    acetaminophen 325 MG tablet   Commonly known as:  TYLENOL   Take 2 tablets (650 mg) by mouth every 6 hours   Last time this was given:  650 mg on 2/15/2017 10:05 AM                                   allopurinol 100 MG tablet   Commonly known as:  ZYLOPRIM   Take 1 tablet (100 mg) by mouth daily   Last time this was given:  100 mg on 2/15/2017 10:10 AM                                   ascorbic acid 500 MG tablet   Commonly known as:  VITAMIN C   Take 1 tablet (500 mg) by mouth daily With iron pill   Last time this was given:  500 mg on 2/15/2017 10:09 AM                                   atorvastatin 10 MG tablet   Commonly known as:  LIPITOR   Take 1 tablet (10 mg) by mouth daily   Last time this was given:  10 mg on 2/14/2017  7:37 PM                                   azelastine 0.05 % Soln ophthalmic solution   Commonly known as:  OPTIVAR   Apply 1 drop to eye 2 times daily    Last time this was given:  1 drop on 2/14/2017  7:43 PM                                      BD SHARPS CONTAINER HOME Misc   1 each as needed                                   bisacodyl 5 MG EC tablet   Commonly known as:  DULCOLAX   Take 1 tablet (5 mg) by mouth daily as needed for constipation                                   blood glucose lancets standard   Commonly known as:  no brand specified   Use to test blood sugar 2 times daily or as directed.                                blood glucose monitoring lancets   Use to test blood sugars 2 times daily or as directed.                                blood glucose monitoring test strip   Commonly known as:  no brand specified   Use to test blood sugar 2 times daily or as directed.                                calcium carbonate-vitamin D 500-400 MG-UNIT Tabs per tablet   Take 1 tablet by mouth 2 times daily                                      carboxymethylcellulose 0.5 % Soln ophthalmic solution   Commonly known as:  REFRESH PLUS   Place 1 drop into the right eye 3 times daily as needed for other (eye irritation or discomfort.)                                   ferrous sulfate 325 (65 FE) MG tablet   Commonly known as:  IRON   Take 1 tablet (325 mg) by mouth daily (with breakfast)   Last time this was given:  325 mg on 2/15/2017 10:09 AM                                   finasteride 5 MG tablet   Commonly known as:  PROSCAR   Take 1 tablet (5 mg) by mouth daily   Last time this was given:  5 mg on 2/14/2017  9:35 AM                                   guaiFENesin-dextromethorphan 100-10 MG/5ML syrup   Commonly known as:  ROBITUSSIN DM   Take 5 mLs by mouth every 4 hours as needed for cough   Last time this was given:  5 mLs on 2/14/2017  7:39 PM                                   levETIRAcetam 750 MG tablet   Commonly known as:  KEPPRA   Take 1 tablet (750 mg) by mouth 2 times daily   Last time this was given:  750 mg on 2/15/2017 10:09 AM                                       levofloxacin 750 MG tablet   Commonly known as:  LEVAQUIN   Take 1 tablet (750 mg) by mouth every 48 hours for 6 days   Last time this was given:  750 mg on 2/14/2017  9:34 AM            Every other day                       loratadine 10 MG tablet   Commonly known as:  CLARITIN   Take 1 tablet (10 mg) by mouth daily                                   losartan 25 MG tablet   Commonly known as:  COZAAR   Take 1 tablet (25 mg) by mouth daily   Last time this was given:  25 mg on 2/15/2017 10:08 AM                                   melatonin 1 MG Tabs tablet   Take 1 tablet (1 mg) by mouth At Bedtime   Last time this was given:  1 mg on 2/14/2017 10:19 PM                                   * metoprolol 25 MG 24 hr tablet   Commonly known as:  TOPROL-XL   Take 1 tablet (25 mg) by mouth every evening   Last time this was given:  50 mg on 2/15/2017 10:06 AM                                   * metoprolol 50 MG 24 hr tablet   Commonly known as:  TOPROL-XL   Take 1 tablet (50 mg) by mouth daily   Last time this was given:  50 mg on 2/15/2017 10:06 AM                                   metroNIDAZOLE 500 MG tablet   Commonly known as:  FLAGYL   Take 1 tablet (500 mg) by mouth every 8 hours for 6 days   Last time this was given:  500 mg on 2/15/2017  1:45 PM                                nitroglycerin 0.4 MG sublingual tablet   Commonly known as:  NITROSTAT   Place 1 tablet (0.4 mg) under the tongue every 5 minutes as needed for chest pain                                   nystatin [id number] UNIT/GM Powd   Commonly known as:  MYCOSTATIN   Apply to affected areas of skin in the groin and on the scrotum three times a day as needed.                                   olopatadine 0.1 % ophthalmic solution   Commonly known as:  PATANOL   Place 1 drop into both eyes 2 times daily   Last time this was given:  1 drop on 2/14/2017  7:43 PM                                      * order for DME   Equipment being ordered: Lift  chair.  Please see indications and face-to-face encounter details in 2/3/15 Office Visit note.                                   * order for DME   Equipment being ordered: Bilateral leg supports for manual wheelchair.                                   * order for DME   Equipment being ordered: Reclining manual w/c with bilateral elevating leg rests.                                   * order for DME   Equipment being ordered: Hospital Bed, fully electric.                                   * order for DME   Equipment being ordered: Wheelchair, manual.                                   * order for DME   Equipment being ordered: Wheelchair, manual, with elevated leg rests and tilt in space back.  Please fax to [facility]; I called them 11/26/16 and they requested the new order.  Face to face is in my 10/26/16 note.                                   oxyCODONE 5 MG IR tablet   Commonly known as:  ROXICODONE   Take 1 tablet (5 mg) by mouth every 4 hours as needed for moderate to severe pain   Last time this was given:  5 mg on 2/15/2017  2:52 AM                                   pantoprazole 40 MG EC tablet   Commonly known as:  PROTONIX   Take 1 tablet (40 mg) by mouth daily   Last time this was given:  40 mg on 2/15/2017 10:09 AM                                   senna-docusate 8.6-50 MG per tablet   Commonly known as:  SENOKOT-S;PERICOLACE   Take 1-2 tablets by mouth 2 times daily as needed for constipation   Last time this was given:  1 tablet on 2/12/2017  8:07 PM                                   simethicone 80 MG chewable tablet   Commonly known as:  MYLICON   Take 1 tablet (80 mg) by mouth 4 times daily   Last time this was given:  80 mg on 2/15/2017  1:45 PM                                            tamsulosin 0.4 MG capsule   Commonly known as:  FLOMAX   Take 1 capsule (0.4 mg) by mouth daily   Last time this was given:  0.4 mg on 2/15/2017 10:09 AM                                   Vitamin B-1 100 MG Tabs   Take 1  tablet (100 mg) by mouth daily   Last time this was given:  100 mg on 2/15/2017 10:08 AM                                   warfarin 3 MG tablet   Commonly known as:  COUMADIN   Take 3mg by mouth on Mondays and Thursdays. Take 4.5mg by mouth all other days of the week.   Last time this was given:  4.5 mg on 2/14/2017  5:30 PM                                   * Notice:  This list has 8 medication(s) that are the same as other medications prescribed for you. Read the directions carefully, and ask your doctor or other care provider to review them with you.

## 2017-02-11 NOTE — PHARMACY-ADMISSION MEDICATION HISTORY
Admission medication history interview status for the 2/11/2017 admission is complete. See Epic admission navigator for allergy information, pharmacy, prior to admission medications and immunization status.     Medication history interview sources:  Patient's daughter, [facility] pharmacy, [facility]    Changes made to PTA medication list (reason)  Added: N/A (per daughter)  Deleted: N/A (per daughter)  Changed:   -Acetaminophen: Take 650mg by mouth every 6 hours --> Take 650mg by mouth as needed. (per daughter)  -Benzonatate: Take 100mg by mouth 3 times daily --> Take 100mg by mouth every morning. (per daughter)  -Warfarin: Take 6mg Sat and Thurs and 4.5 the rest of the week --> Take 3mg by mouth on Mon and Thurs. Take 4.5mg by mouth all other days of the week. (per [facility] note 2/6/17)    Additional medication history information (including reliability of information, actions taken by pharmacist):  -Pt's daughter was unable to report strengths/dosing regimen for some medications; [facility] pharmacy used to confirm some medication strengths, dosing regimens, etc.  -Pt's daughter was unsure about whether her father was taking the following medications (listed below). [facility] pharmacy was unable to confirm strengths, dosing regimen, etc. of these due to a lack of recent fill history (since 2016).   **carboxymethylcellulose (Refresh Plus) ophthalmic solution  **finasteride tablets  **melatonin tablets  **olopatadine ophthalmic solution  **simethicone chewable tablets/  -Pt's most recent dosing instruction change was 2/6/17, to the current dosing regimen.       Prior to Admission medications    Medication Sig Last Dose Taking? Auth Provider   losartan (COZAAR) 25 MG tablet Take 1 tablet (25 mg) by mouth daily 2/10/2017 at AM Yes Evon Lemons MD   furosemide (LASIX) 20 MG tablet Take one tablet by mouth once daily as needed for fluid overload. 2/8/2017 at Unknown time Yes Thomas Dill MD    allopurinol (ZYLOPRIM) 100 MG tablet Take 1 tablet (100 mg) by mouth daily 2/10/2017 at Unknown time Yes Thomas Dill MD   atorvastatin (LIPITOR) 10 MG tablet Take 1 tablet (10 mg) by mouth daily 2/10/2017 at Unknown time Yes Jacquelin Santamaria APRN CNP   order for DME Equipment being ordered: Wheelchair, manual, with elevated leg rests and tilt in space back.  Please fax to [facility]; I called them 11/26/16 and they requested the new order.  Face to face is in my 10/26/16 note. 2/10/2017 Yes Thomas Dill MD   pantoprazole (PROTONIX) 40 MG enteric coated tablet Take 1 tablet (40 mg) by mouth daily 2/10/2017 Yes Jess Carter APRN CNP   warfarin (COUMADIN) 3 MG tablet Take 3mg by mouth on Mondays and Thursdays. Take 4.5mg by mouth all other days of the week. 2/10/2017 Yes Zuleika Patterson APRN CNP   tamsulosin (FLOMAX) 0.4 MG 24 hr capsule Take 1 capsule (0.4 mg) by mouth daily 2/10/2017 at Unknown time Yes Thomas Dill MD   senna-docusate (SENOKOT-S;PERICOLACE) 8.6-50 MG per tablet Take 1-2 tablets by mouth 2 times daily as needed for constipation 2/10/2017 at Unknown time Yes Thomas Dill MD   order for DME Equipment being ordered: Wheelchair, manual. 2/10/2017 Yes Thomas Dill MD   order for DME Equipment being ordered: Hospital Bed, fully electric. 2/10/2017 Yes Thomas Dill MD   azelastine (OPTIVAR) 0.05 % SOLN Apply 1 drop to eye 2 times daily  Patient taking differently: Apply 1 drop to eye as needed  2/10/2017 at Unknown time Yes Thomas Dill MD   loratadine (CLARITIN) 10 MG tablet Take 1 tablet (10 mg) by mouth daily 2/10/2017 at Unknown time Yes Thomas Dill MD   levETIRAcetam (KEPPRA) 750 MG tablet Take 1 tablet (750 mg) by mouth 2 times daily 2/10/2017 at Unknown time Yes Thomas Dill MD   ferrous sulfate (IRON) 325 (65 FE) MG tablet Take 1 tablet (325 mg) by mouth daily (with breakfast) 2/10/2017 at Unknown time Yes  Thomas Dill MD   ascorbic acid (VITAMIN C) 500 MG tablet Take 1 tablet (500 mg) by mouth daily With iron pill Past Week at Unknown time Yes Thomas Dill MD   metoprolol (TOPROL-XL) 25 MG 24 hr tablet Take 1 tablet (25 mg) by mouth every evening 2/10/2017 at Unknown time Yes Thomas Dill MD   metoprolol (TOPROL-XL) 50 MG 24 hr tablet Take 1 tablet (50 mg) by mouth daily 2/10/2017 at Unknown time Yes Thomas Dill MD   blood glucose (NO BRAND SPECIFIED) lancets standard Use to test blood sugar 2 times daily or as directed. 2/10/2017 Yes Thomas Dill MD   blood glucose monitoring (NO BRAND SPECIFIED) test strip Use to test blood sugar 2 times daily or as directed. 2/10/2017 Yes Thomas Dill MD   simethicone (MYLICON) 80 MG chewable tablet Take 1 tablet (80 mg) by mouth 4 times daily 2/10/2017 Yes Prosper Poe MD   order for DME Equipment being ordered: Reclining manual w/c with bilateral elevating leg rests. 2/10/2017 Yes Thomas Dill MD   oxyCODONE (ROXICODONE) 5 MG immediate release tablet Take 1 tablet (5 mg) by mouth every 4 hours as needed for moderate to severe pain Past Month at Unknown time Yes Thomas Dill MD   nystatin (MYCOSTATIN) 974696 UNIT/GM POWD Apply to affected areas of skin in the groin and on the scrotum three times a day as needed. Past Month at Unknown time Yes Thomas Dill MD   BD SHARPS CONTAINER HOME MISC 1 each as needed 2/10/2017 Yes Thomas Dill MD   order for DME Equipment being ordered: Bilateral leg supports for manual wheelchair. 2/10/2017 Yes Thomas Dill MD   Thiamine HCl (VITAMIN B-1) 100 MG TABS Take 1 tablet (100 mg) by mouth daily 2/10/2017 at Unknown time Yes Thomas Dill MD   olopatadine (PATANOL) 0.1 % ophthalmic solution Place 1 drop into both eyes 2 times daily 2/10/2017 Yes Violeta Cosme MD   blood glucose monitoring (NO BRAND SPECIFIED) lancets Use to test  blood sugars 2 times daily or as directed. 2/10/2017 Yes Thomas Dill MD   benzonatate (TESSALON) 100 MG capsule Take 1 capsule (100 mg) by mouth 3 times daily as needed for cough  Patient taking differently: Take 100 mg by mouth every morning  2/10/2017 at Unknown time Yes Thomas Dill MD   ORDER FOR DME Equipment being ordered: Lift chair.    Please see indications and face-to-face encounter details in 2/3/15 Office Visit note. 2/10/2017 Yes Thomas Dill MD   acetaminophen (TYLENOL) 325 MG tablet Take 2 tablets (650 mg) by mouth every 6 hours  Patient taking differently: Take 650 mg by mouth as needed  2/11/2017 at AM Yes Odilon Uriostegui MD   melatonin 1 MG TABS Take 1 tablet (1 mg) by mouth At Bedtime 2/10/2017 at Unknown time Yes Bert Lagos MD   bisacodyl (DULCOLAX) 5 MG EC tablet Take 1 tablet (5 mg) by mouth daily as needed for constipation 2/10/2017 Yes Bert Lagos MD   diphenhydrAMINE HCl 12.5 MG/5ML SYRP Take 50 mg by mouth nightly as needed (Cough) Unknown at Unknown time  Garett Jurado MD   finasteride (PROSCAR) 5 MG tablet Take 1 tablet (5 mg) by mouth daily Unknown at Unknown time  Zuleika Patterson APRN CNP   carboxymethylcellulose (REFRESH PLUS) 0.5 % SOLN Place 1 drop into the right eye 3 times daily as needed for other (eye irritation or discomfort.) Unknown at Unknown time  Thomas Dill MD   nitroglycerin (NITROSTAT) 0.4 MG SL tablet Place 1 tablet (0.4 mg) under the tongue every 5 minutes as needed for chest pain More than a month at Unknown time  Jess Carter APRN CNP   calcium carbonate-vitamin D 500-400 MG-UNIT TABS tablt Take 1 tablet by mouth 2 times daily  Patient taking differently: Take 1 tablet by mouth daily  Unknown at Unknown time  Thomas Dill MD         Medication history completed by: Minerva Bowman, PharmD, BCPS  February 11, 2017

## 2017-02-11 NOTE — IP AVS SNAPSHOT
[address] [address]    Phone:  [phone]                                       After Visit Summary   2/11/2017    Sohan Rendon    MRN: [id number]           After Visit Summary Signature Page     I have received my discharge instructions, and my questions have been answered. I have discussed any challenges I see with this plan with the nurse or doctor.    ..........................................................................................................................................  Patient/Patient Representative Signature      ..........................................................................................................................................  Patient Representative Print Name and Relationship to Patient    ..................................................               ................................................  Date                                            Time    ..........................................................................................................................................  Reviewed by Signature/Title    ...................................................              ..............................................  Date                                                            Time

## 2017-02-12 LAB
ALBUMIN SERPL-MCNC: 3 G/DL (ref 3.4–5)
ALP SERPL-CCNC: 98 U/L (ref 40–150)
ALT SERPL W P-5'-P-CCNC: 21 U/L (ref 0–70)
ANION GAP SERPL CALCULATED.3IONS-SCNC: 9 MMOL/L (ref 3–14)
AST SERPL W P-5'-P-CCNC: 38 U/L (ref 0–45)
BACTERIA SPEC CULT: ABNORMAL
BILIRUB SERPL-MCNC: 1 MG/DL (ref 0.2–1.3)
BUN SERPL-MCNC: 22 MG/DL (ref 7–30)
CALCIUM SERPL-MCNC: 8.3 MG/DL (ref 8.5–10.1)
CHLORIDE SERPL-SCNC: 109 MMOL/L (ref 94–109)
CO2 SERPL-SCNC: 20 MMOL/L (ref 20–32)
CREAT SERPL-MCNC: 1.68 MG/DL (ref 0.66–1.25)
ERYTHROCYTE [DISTWIDTH] IN BLOOD BY AUTOMATED COUNT: 16.3 % (ref 10–15)
GFR SERPL CREATININE-BSD FRML MDRD: 41 ML/MIN/1.7M2
GLUCOSE SERPL-MCNC: 196 MG/DL (ref 70–99)
GRAM STN SPEC: ABNORMAL
HCT VFR BLD AUTO: 38.9 % (ref 40–53)
HGB BLD-MCNC: 13.2 G/DL (ref 13.3–17.7)
INR PPP: 2.38 (ref 0.86–1.14)
INTERPRETATION ECG - MUSE: NORMAL
LMWH PPP CHRO-ACNC: 0.21 IU/ML
LMWH PPP CHRO-ACNC: 0.36 IU/ML
Lab: ABNORMAL
Lab: ABNORMAL
MAGNESIUM SERPL-MCNC: 2.1 MG/DL (ref 1.6–2.3)
MCH RBC QN AUTO: 32 PG (ref 26.5–33)
MCHC RBC AUTO-ENTMCNC: 33.9 G/DL (ref 31.5–36.5)
MCV RBC AUTO: 94 FL (ref 78–100)
MICRO REPORT STATUS: ABNORMAL
MICRO REPORT STATUS: ABNORMAL
PLATELET # BLD AUTO: 116 10E9/L (ref 150–450)
POTASSIUM SERPL-SCNC: 3.5 MMOL/L (ref 3.4–5.3)
PROT SERPL-MCNC: 6.8 G/DL (ref 6.8–8.8)
RBC # BLD AUTO: 4.12 10E12/L (ref 4.4–5.9)
SODIUM SERPL-SCNC: 138 MMOL/L (ref 133–144)
SPECIMEN SOURCE: ABNORMAL
SPECIMEN SOURCE: ABNORMAL
WBC # BLD AUTO: 6 10E9/L (ref 4–11)

## 2017-02-12 PROCEDURE — A9270 NON-COVERED ITEM OR SERVICE: HCPCS | Mod: GY | Performed by: INTERNAL MEDICINE

## 2017-02-12 PROCEDURE — 80053 COMPREHEN METABOLIC PANEL: CPT | Performed by: INTERNAL MEDICINE

## 2017-02-12 PROCEDURE — 85610 PROTHROMBIN TIME: CPT | Performed by: INTERNAL MEDICINE

## 2017-02-12 PROCEDURE — 85027 COMPLETE CBC AUTOMATED: CPT | Performed by: INTERNAL MEDICINE

## 2017-02-12 PROCEDURE — 99233 SBSQ HOSP IP/OBS HIGH 50: CPT | Mod: GC | Performed by: INTERNAL MEDICINE

## 2017-02-12 PROCEDURE — 25900017 H RX MED GY IP 259 OP 259 PS 637: Mod: GY | Performed by: INTERNAL MEDICINE

## 2017-02-12 PROCEDURE — 36415 COLL VENOUS BLD VENIPUNCTURE: CPT | Performed by: INTERNAL MEDICINE

## 2017-02-12 PROCEDURE — 25000128 H RX IP 250 OP 636: Performed by: INTERNAL MEDICINE

## 2017-02-12 PROCEDURE — 83735 ASSAY OF MAGNESIUM: CPT | Performed by: INTERNAL MEDICINE

## 2017-02-12 PROCEDURE — 85520 HEPARIN ASSAY: CPT | Performed by: INTERNAL MEDICINE

## 2017-02-12 PROCEDURE — 25000132 ZZH RX MED GY IP 250 OP 250 PS 637: Mod: GY | Performed by: INTERNAL MEDICINE

## 2017-02-12 PROCEDURE — 87205 SMEAR GRAM STAIN: CPT | Performed by: INTERNAL MEDICINE

## 2017-02-12 PROCEDURE — 94799 UNLISTED PULMONARY SVC/PX: CPT

## 2017-02-12 PROCEDURE — 21400003 ZZH R&B CCU CRITICAL UMMC

## 2017-02-12 RX ADMIN — SIMETHICONE CHEW TAB 80 MG 80 MG: 80 TABLET ORAL at 12:17

## 2017-02-12 RX ADMIN — THIAMINE HCL (VITAMIN B1) 50 MG TABLET 100 MG: at 08:49

## 2017-02-12 RX ADMIN — IRON 325 MG: 65 TABLET ORAL at 08:49

## 2017-02-12 RX ADMIN — SIMETHICONE CHEW TAB 80 MG 80 MG: 80 TABLET ORAL at 08:49

## 2017-02-12 RX ADMIN — OLOPATADINE HYDROCHLORIDE 1 DROP: 1 SOLUTION/ DROPS OPHTHALMIC at 08:51

## 2017-02-12 RX ADMIN — SIMETHICONE CHEW TAB 80 MG 80 MG: 80 TABLET ORAL at 20:07

## 2017-02-12 RX ADMIN — METOPROLOL SUCCINATE 25 MG: 25 TABLET, FILM COATED, EXTENDED RELEASE ORAL at 20:07

## 2017-02-12 RX ADMIN — HEPARIN SODIUM 950 UNITS/HR: 10000 INJECTION, SOLUTION INTRAVENOUS at 13:56

## 2017-02-12 RX ADMIN — ALLOPURINOL 100 MG: 100 TABLET ORAL at 08:51

## 2017-02-12 RX ADMIN — OLOPATADINE HYDROCHLORIDE 1 DROP: 1 SOLUTION/ DROPS OPHTHALMIC at 20:08

## 2017-02-12 RX ADMIN — SODIUM CHLORIDE 500 MG: 9 INJECTION, SOLUTION INTRAVENOUS at 12:17

## 2017-02-12 RX ADMIN — AZELASTINE HYDROCHLORIDE 1 DROP: 0.5 SOLUTION/ DROPS INTRAOCULAR at 20:08

## 2017-02-12 RX ADMIN — BISACODYL 10 MG: 10 SUPPOSITORY RECTAL at 09:12

## 2017-02-12 RX ADMIN — HEPARIN SODIUM 950 UNITS/HR: 10000 INJECTION, SOLUTION INTRAVENOUS at 10:53

## 2017-02-12 RX ADMIN — SENNOSIDES AND DOCUSATE SODIUM 1 TABLET: 8.6; 5 TABLET ORAL at 20:07

## 2017-02-12 RX ADMIN — ACETAMINOPHEN 650 MG: 325 TABLET, FILM COATED ORAL at 18:37

## 2017-02-12 RX ADMIN — Medication 1 MG: at 20:07

## 2017-02-12 RX ADMIN — OXYCODONE HYDROCHLORIDE AND ACETAMINOPHEN 500 MG: 500 TABLET ORAL at 08:49

## 2017-02-12 RX ADMIN — LEVETIRACETAM 750 MG: 750 TABLET, FILM COATED ORAL at 20:07

## 2017-02-12 RX ADMIN — ATORVASTATIN CALCIUM 10 MG: 10 TABLET, FILM COATED ORAL at 20:08

## 2017-02-12 RX ADMIN — PANTOPRAZOLE SODIUM 40 MG: 40 TABLET, DELAYED RELEASE ORAL at 08:49

## 2017-02-12 RX ADMIN — AZELASTINE HYDROCHLORIDE 1 DROP: 0.5 SOLUTION/ DROPS INTRAOCULAR at 08:51

## 2017-02-12 RX ADMIN — TAMSULOSIN HYDROCHLORIDE 0.4 MG: 0.4 CAPSULE ORAL at 08:49

## 2017-02-12 RX ADMIN — LEVETIRACETAM 750 MG: 750 TABLET, FILM COATED ORAL at 08:49

## 2017-02-12 NOTE — PLAN OF CARE
"Problem: Goal Outcome Summary  Goal: Goal Outcome Summary  Outcome: No Change  No acute events overnight. Repositioned as tolerated with family members. Heparin therapeutic. MIVF infusing. NPO. Pain is \"Comfortably managed.\" Not requiring PRN meds overnight. No LVAD alarms/concerns overnight.      Santos Rahman RN 02/12/17 6:32 AM     Hours of cares [id number]             "

## 2017-02-12 NOTE — PLAN OF CARE
D:pt states pain is getting better  I:prn tylenol given once  A:pt motions to lower right side  P:Per Primary

## 2017-02-12 NOTE — PROGRESS NOTES
General Surgery Progress Note  S[facility]  [id number]    Subjective  No overnight events.  Pt reports that pain is improved subjectively.  Denies N//V. Spoke with patient through     Objective  Temp:  [97.6  F (36.4  C)-98.7  F (37.1  C)] 98  F (36.7  C)  Pulse:  [64] 64  Heart Rate:  [60-66] 62  Resp:  [16-18] 18  BP: ()/() 98/86  SpO2:  [94 %-100 %] 100 %    Intake/Output Summary (Last 24 hours) at 02/12/17 0946  Last data filed at 02/12/17 0659   Gross per 24 hour   Intake           1752.7 ml   Output             1000 ml   Net            752.7 ml     Physical Exam:  Gen: NAD, resting comfortably  CVS: RRR  Resp: non-labored breathing, on room air  Abd: soft, no distention, mild tenderness in rLq near McBurney's, no rebound/guarding, no peritoneal signs      Results:  BMP  Recent Labs  Lab 02/12/17  0809 02/11/17  0651 02/08/17  0016    139 137   POTASSIUM 3.5 4.2 3.8   CHLORIDE 109 106 104   CO2 20 24 26   BUN 22 27 21   CR 1.68* 1.91* 2.16*   * 155* 200*     CBC  Recent Labs  Lab 02/12/17  0809 02/11/17  1603 02/11/17  0651 02/08/17  0016   WBC 6.0 10.8 7.3 5.7   HGB 13.2* 13.6 15.0 14.2   * 135* 124* 125*     LFT  Recent Labs  Lab 02/12/17  0809 02/11/17  0651 02/08/17  0016 02/06/17   AST 38 52*  --   --    ALT 21 26  --   --    ALKPHOS 98 115  --   --    BILITOTAL 1.0 0.8  --   --    ALBUMIN 3.0* 3.5  --   --    INR 2.38* 1.70* 2.34* 2.20       Recent Labs  Lab 02/12/17  0809 02/11/17  0651 02/08/17  0016   * 155* 200*       Assessment & Plan  67 year old male with complex PMHx including CHF, ICM s/p LVAD, and CKD who was admitted with acute appendicitis.     Discusses with patient options for surgical management including risks and benefits as well as alternatives in management. Patient and family reluctant to undergo surgery at this time, which is reasonable given patients medical comorbidities.  WBC normalized, and pain improved -- however, would keep on  clear liquids today due to some pain remaining in RLQ.      -continue conservative mgmt -- IV abx  -OK for clear liquids  -surgery will follow  -cares per primary    Sebastian Mccullough  PGY 5

## 2017-02-12 NOTE — PLAN OF CARE
Problem: Goal Outcome Summary  Goal: Goal Outcome Summary  D/I/A: Pt here w/ appendicitis. LVAD dressing change. Pt has D51/2NS @ 75ml/hr and heparin at 950ml/hr; 10a recheck ordered for 1700. Pt had 1 large BM and a smaller one. LVAD #s WNL, VSS. Decreased pain from yesterday.  P: Continue to monitor.

## 2017-02-12 NOTE — PROGRESS NOTES
"[facility]  Cardiology I Service  Progress Note  February 12, 2017    Subjective:   Very good night of sleep, per daughters. Improving abdominal pain. Eager to eat/drink. Denies fevers, chills, chest pain, shortness of breath.     Pertinent Medications:  Ertapenem, renally dosed     Examination:  BP (!) 111/93  Pulse 64  Temp 97.9  F (36.6  C) (Oral)  Resp 18  Ht 1.727 m (5' 8\")  Wt 44.7 kg (98 lb 8 oz)  SpO2 100%  BMI 14.98 kg/m2  GEN: A & Ox3, [place] man, chronically ill appearing male, NAD, cooperative and conversational   HEENT: PERRL, no scleral icterus, mucus membranes moist  NECK: Supple, no LAD, JVP not elevated   RESP: CTA bilaterally, no wheezing, no crackles, no increased work of breathing   CV: Regular, normal rate, S1 and S2 without m/r/g   ABD: Two scars in center of abdomen (per daughters, reportedly from prior feeding tube), Soft, minimally tender to palpation in RLQ, no HSM, +BS, +guarding  EXT: No peripheral edema, warm/well perfused   NEURO: CN II-XII intact, normal 5/5 strength in all extremitites  SKIN: Normal skin turgor, no rash on limited exam    Data & Hemodynamics reviewed in EPIC.     Assessment and Plan  Sohan Rendon is a 67 year old man with chronic systolic heart failure 2/2 to ischemic CM s/p HM II LVAD placement in [place] now Destination Therapy due to multiple CVA's with residual left sided weakness c/b recurrent hemolysis and pump thrombus, who was admitted for 24 hr history of RLQ abdominal pain found to have CT evidence of non-perforated appendicitis.  Clinically improving with conservative management on Ertapenem.     Changes to Plan today:  -Advance to Clear Liquids  -Continue Ertapenem given clinical improvement and no leukocytosis   -Continue holding warfarin for one more night, and continue heparin     #Appendicitis: Presented with RLQ pain, tenderness at McBurney's point. Afebrile, hemodynamically stable. Normal WBC. CT scan with " non-perforated appendicitis. There is also a nonobstructing nephrolithiasis in both kidneys as well.   --Surgery consulted, appreciate recs  --Ertapenem renally dosed per Pharmacy; will attempt conservative management. If this fails, pt agreeable to appendectomy.   --Oxycodone 5mg q4h prn      #Chronic systolic heart failure secondary to ischemic CM:  Stage D, NYHA Class III B. S/p HM II LVAD placement in [place] now DT due to multiple CVAs with residual left sided weakness complicated by recurrent hemolysis and pump thrombus. Focus of care remains on quality of life.    --Not on ACEi/ARB because he did not tolerate it  --LVAD settings: Flow 4.1 lpm, PI 5.7, Speed 8800 rpm, Power 5.2 capps  --Atorvastin 10mg PO daily  --Holding home Lasix 20mg PO daily   --Metoprolol 50mg daily, 25mg qhs   --Losartan 25mg daily   --SCD prophylaxis: ICD  --% BiV pacing:   N/A  --Fluid status: euvolemic. Avoid dehydration  --INR goal: 2.5-3 due to CVAs and pump thrombus.  Coumadin clinic to adjust.  --Antiplatelet agents:   --NSAID use: No     #ICD interrogation: Last interrogated on 1/11 with intrinsic rhythm is 65 bpm. Normal ICD function. No arrhythmias recorded. OptiVol thoracic impedance slightly above baseline.   85%.     #Cough: Evaluated by ED on 2/7, felt most likely Benzonatate 100mg PO qam  --Discontinue diphenhydramine, initiate Robitussen   --Sputum culture      #HTN: currently controlled, no issues with hypotension.   *Home regimen: Lasix 20mg once daily, Losartan 25mg PO tablet, Metoprolol as above   --Hold lasix and Losartan given HEATH     #Multiple CVAs with residual and recurrent hemolysis/thrombus:  No significant rehab potential.  Continue coumadin and ASA.  Not candidate for pump exchange.  Continue to focus on symptom management.        #History of seizure: Continue Levetiracetam 750mg PO BID      #Cough: Evaluated by ED on 2/7, felt most likely Benzonatate 100mg PO qam     #Gout: Allopurinol 100mg PO  daily      #Constipation: Bisocodyl 5mg PO daily prn      #BPH: Finasteride 5mg PO daily, flomax .4mg daily      #Iron Deficiency Anemia: Ferrous sulfate 325mg PO daily with breakfast and Vitamin C  #Insomnia: Meltaonin 1mg qhs     ##Other  -DVT Prophylaxis: Hold warfarin and use high intensity heparin for next48 hrs until it is more clear whether or not pt will need appendectomy.   -Bowel: Senna-docusate and Dulcolax   - FEN: Clears   - IVF: D5 1/2 NS at 75ml/hr for first 24 hrs of admission, now advanced to Clears   - Electrolyte Protocol:   - Telemetry: Yes  - Family: Daughters at bedside updated  - Code status: Full Code     Disposition: Cards 2 Service on 6C pending clinical course (conservative management vs. Appendectomy)     Jess Moore MD  Internal Medicine, PGY2  Pager [phone]          I have reviewed today's vital signs, notes, medications, labs and imaging.  I have also seen and examined the patient and agree with the findings and plan as outlined above.  Pt alert and in no apparent discomfort.  Family members assisting with translation.  VSS with pt afebrile, HR 62 and BP 98/86 with 1 L UO.  Lungs clear and mechanical LVAD hum.  Abd with positive guarding and mild RUQ pain to palpation. Labs with WBC 6.  Assessment: pt with LVAD support due to advanced heart failure and previous CVAs now with CT evidence for appendicitis followed by surgery.  Continue iv abx, heparin, hold coumadin and follow for fever, leukocytosis and change in physical exam.     Jaleel Lebron MD, PhD  Professor, Heart Failure and Cardiac Transplantation  [facility]

## 2017-02-13 LAB
ANION GAP SERPL CALCULATED.3IONS-SCNC: 9 MMOL/L (ref 3–14)
BUN SERPL-MCNC: 16 MG/DL (ref 7–30)
CALCIUM SERPL-MCNC: 8.4 MG/DL (ref 8.5–10.1)
CHLORIDE SERPL-SCNC: 111 MMOL/L (ref 94–109)
CO2 SERPL-SCNC: 20 MMOL/L (ref 20–32)
CREAT SERPL-MCNC: 1.68 MG/DL (ref 0.66–1.25)
ERYTHROCYTE [DISTWIDTH] IN BLOOD BY AUTOMATED COUNT: 15.9 % (ref 10–15)
GFR SERPL CREATININE-BSD FRML MDRD: 41 ML/MIN/1.7M2
GLUCOSE SERPL-MCNC: 118 MG/DL (ref 70–99)
HCT VFR BLD AUTO: 39.8 % (ref 40–53)
HGB BLD-MCNC: 13.1 G/DL (ref 13.3–17.7)
INR PPP: 2.71 (ref 0.86–1.14)
LMWH PPP CHRO-ACNC: 0.5 IU/ML
MCH RBC QN AUTO: 31 PG (ref 26.5–33)
MCHC RBC AUTO-ENTMCNC: 32.9 G/DL (ref 31.5–36.5)
MCV RBC AUTO: 94 FL (ref 78–100)
PLATELET # BLD AUTO: 121 10E9/L (ref 150–450)
POTASSIUM SERPL-SCNC: 3.7 MMOL/L (ref 3.4–5.3)
RBC # BLD AUTO: 4.22 10E12/L (ref 4.4–5.9)
SODIUM SERPL-SCNC: 140 MMOL/L (ref 133–144)
WBC # BLD AUTO: 4.8 10E9/L (ref 4–11)

## 2017-02-13 PROCEDURE — 85520 HEPARIN ASSAY: CPT | Performed by: INTERNAL MEDICINE

## 2017-02-13 PROCEDURE — 21400003 ZZH R&B CCU CRITICAL UMMC

## 2017-02-13 PROCEDURE — 25000132 ZZH RX MED GY IP 250 OP 250 PS 637: Mod: GY | Performed by: INTERNAL MEDICINE

## 2017-02-13 PROCEDURE — 85027 COMPLETE CBC AUTOMATED: CPT | Performed by: INTERNAL MEDICINE

## 2017-02-13 PROCEDURE — 99232 SBSQ HOSP IP/OBS MODERATE 35: CPT | Mod: GC | Performed by: INTERNAL MEDICINE

## 2017-02-13 PROCEDURE — 36415 COLL VENOUS BLD VENIPUNCTURE: CPT | Performed by: INTERNAL MEDICINE

## 2017-02-13 PROCEDURE — A9270 NON-COVERED ITEM OR SERVICE: HCPCS | Mod: GY | Performed by: INTERNAL MEDICINE

## 2017-02-13 PROCEDURE — 25000128 H RX IP 250 OP 636: Performed by: INTERNAL MEDICINE

## 2017-02-13 PROCEDURE — 93750 INTERROGATION VAD IN PERSON: CPT

## 2017-02-13 PROCEDURE — 85610 PROTHROMBIN TIME: CPT | Performed by: INTERNAL MEDICINE

## 2017-02-13 PROCEDURE — 80048 BASIC METABOLIC PNL TOTAL CA: CPT | Performed by: INTERNAL MEDICINE

## 2017-02-13 RX ORDER — POTASSIUM CHLORIDE 7.45 MG/ML
10 INJECTION INTRAVENOUS
Status: DISCONTINUED | OUTPATIENT
Start: 2017-02-13 | End: 2017-02-15 | Stop reason: HOSPADM

## 2017-02-13 RX ORDER — POTASSIUM CHLORIDE 29.8 MG/ML
20 INJECTION INTRAVENOUS
Status: DISCONTINUED | OUTPATIENT
Start: 2017-02-13 | End: 2017-02-15 | Stop reason: HOSPADM

## 2017-02-13 RX ORDER — METOPROLOL SUCCINATE 50 MG/1
50 TABLET, EXTENDED RELEASE ORAL DAILY
Status: DISCONTINUED | OUTPATIENT
Start: 2017-02-14 | End: 2017-02-15 | Stop reason: HOSPADM

## 2017-02-13 RX ORDER — POTASSIUM CHLORIDE 750 MG/1
20-40 TABLET, EXTENDED RELEASE ORAL
Status: DISCONTINUED | OUTPATIENT
Start: 2017-02-13 | End: 2017-02-15 | Stop reason: HOSPADM

## 2017-02-13 RX ORDER — LOSARTAN POTASSIUM 25 MG/1
25 TABLET ORAL DAILY
Status: DISCONTINUED | OUTPATIENT
Start: 2017-02-13 | End: 2017-02-15 | Stop reason: HOSPADM

## 2017-02-13 RX ORDER — MAGNESIUM SULFATE HEPTAHYDRATE 40 MG/ML
4 INJECTION, SOLUTION INTRAVENOUS EVERY 4 HOURS PRN
Status: DISCONTINUED | OUTPATIENT
Start: 2017-02-13 | End: 2017-02-15 | Stop reason: HOSPADM

## 2017-02-13 RX ORDER — POTASSIUM CHLORIDE 1.5 G/1.58G
20-40 POWDER, FOR SOLUTION ORAL
Status: DISCONTINUED | OUTPATIENT
Start: 2017-02-13 | End: 2017-02-15 | Stop reason: HOSPADM

## 2017-02-13 RX ORDER — GUAIFENESIN/DEXTROMETHORPHAN 100-10MG/5
5 SYRUP ORAL 3 TIMES DAILY
Status: DISPENSED | OUTPATIENT
Start: 2017-02-13 | End: 2017-02-15

## 2017-02-13 RX ADMIN — LEVETIRACETAM 750 MG: 750 TABLET, FILM COATED ORAL at 20:30

## 2017-02-13 RX ADMIN — SIMETHICONE CHEW TAB 80 MG 80 MG: 80 TABLET ORAL at 20:30

## 2017-02-13 RX ADMIN — LEVETIRACETAM 750 MG: 750 TABLET, FILM COATED ORAL at 08:51

## 2017-02-13 RX ADMIN — OXYCODONE HYDROCHLORIDE AND ACETAMINOPHEN 500 MG: 500 TABLET ORAL at 08:50

## 2017-02-13 RX ADMIN — IRON 325 MG: 65 TABLET ORAL at 08:50

## 2017-02-13 RX ADMIN — ALLOPURINOL 100 MG: 100 TABLET ORAL at 08:51

## 2017-02-13 RX ADMIN — Medication 1 MG: at 21:28

## 2017-02-13 RX ADMIN — OLOPATADINE HYDROCHLORIDE 1 DROP: 1 SOLUTION/ DROPS OPHTHALMIC at 20:31

## 2017-02-13 RX ADMIN — TAMSULOSIN HYDROCHLORIDE 0.4 MG: 0.4 CAPSULE ORAL at 08:50

## 2017-02-13 RX ADMIN — AZELASTINE HYDROCHLORIDE 1 DROP: 0.5 SOLUTION/ DROPS INTRAOCULAR at 20:31

## 2017-02-13 RX ADMIN — METOPROLOL SUCCINATE 25 MG: 25 TABLET, FILM COATED, EXTENDED RELEASE ORAL at 20:30

## 2017-02-13 RX ADMIN — PANTOPRAZOLE SODIUM 40 MG: 40 TABLET, DELAYED RELEASE ORAL at 08:51

## 2017-02-13 RX ADMIN — POTASSIUM CHLORIDE 20 MEQ: 750 TABLET, EXTENDED RELEASE ORAL at 21:28

## 2017-02-13 RX ADMIN — OLOPATADINE HYDROCHLORIDE 1 DROP: 1 SOLUTION/ DROPS OPHTHALMIC at 08:52

## 2017-02-13 RX ADMIN — THIAMINE HCL (VITAMIN B1) 50 MG TABLET 100 MG: at 08:51

## 2017-02-13 RX ADMIN — GUAIFENESIN AND DEXTROMETHORPHAN 5 ML: 100; 10 SYRUP ORAL at 17:42

## 2017-02-13 RX ADMIN — LOSARTAN POTASSIUM 25 MG: 25 TABLET, FILM COATED ORAL at 17:31

## 2017-02-13 RX ADMIN — SIMETHICONE CHEW TAB 80 MG 80 MG: 80 TABLET ORAL at 17:31

## 2017-02-13 RX ADMIN — SODIUM CHLORIDE 500 MG: 9 INJECTION, SOLUTION INTRAVENOUS at 12:29

## 2017-02-13 RX ADMIN — WARFARIN SODIUM 4.5 MG: 2.5 TABLET ORAL at 17:31

## 2017-02-13 RX ADMIN — SIMETHICONE CHEW TAB 80 MG 80 MG: 80 TABLET ORAL at 08:50

## 2017-02-13 RX ADMIN — OXYCODONE HYDROCHLORIDE 5 MG: 5 TABLET ORAL at 04:22

## 2017-02-13 RX ADMIN — ACETAMINOPHEN 650 MG: 325 TABLET, FILM COATED ORAL at 12:43

## 2017-02-13 RX ADMIN — GUAIFENESIN AND DEXTROMETHORPHAN 5 ML: 100; 10 SYRUP ORAL at 13:29

## 2017-02-13 RX ADMIN — ATORVASTATIN CALCIUM 10 MG: 10 TABLET, FILM COATED ORAL at 20:30

## 2017-02-13 RX ADMIN — AZELASTINE HYDROCHLORIDE 1 DROP: 0.5 SOLUTION/ DROPS INTRAOCULAR at 08:52

## 2017-02-13 NOTE — PLAN OF CARE
D:pt given coumadin yesterday and it was held today, Pt family wanted to know why it was held  I:Per Primary note coumadin is being held,give MD progress notes as requested  A:pt currently therapeutic with heparin  P:Per Primay

## 2017-02-13 NOTE — PROGRESS NOTES
Indication of Interrogation:  Admission     Type of VAD:  Heartmate 2    Current Parameters:  Flow= --- lpm, Speed= 8800 rpm, Power= 5.3 capps, PI (if applicable)= 5.0    Abnormal Alarm on History:  No    Abnormal Events/Parameters Notes:  Yes, explain: several PI events, PI WNL     Changes Made during Interrogation:  No  I personally saw and examined this patient, reviewed imaging and laboratory studies, confirmed physical examination and discussed results and plan with patient and or family.

## 2017-02-13 NOTE — PLAN OF CARE
Problem: Goal Outcome Summary  Goal: Goal Outcome Summary  Outcome: No Change  No significant events over night. Pt slept well between cares. VSS. LVAD #'s WNL. Pt with good urine output. Pt c/o abdominal pain. Oxycodone 5mg given and effective in relieving pain. Heparin gtt remains at 950units/hr. Family at bedside. Continue to monitor. Notify MD of any changes/concerns.

## 2017-02-13 NOTE — PROGRESS NOTES
General Surgery Progress note    S:  No overnight events.  Pt seen at bedside resting comfortably.  Reports improvement in pain. Tolerating clears. Afebrile.   O:  Vitals:    02/12/17 1629 02/12/17 2109 02/13/17 0000 02/13/17 0415   BP: 100/88 (!) 108/91  107/86   BP Location: Right arm Right arm  Right arm   Pulse:       Resp:  18 18 20   Temp:  97.7  F (36.5  C)  98  F (36.7  C)   TempSrc:  Oral  Oral   SpO2:  99%  99%   Weight:       Height:           A&Ox3, NAD  Breathing non-labored  Soft, ND, minimally ttp at RLQ. No rebound.       BMP  Recent Labs  Lab 02/12/17  0809 02/11/17  0651 02/08/17  0016    139 137   POTASSIUM 3.5 4.2 3.8   CHLORIDE 109 106 104   JOSÉ 8.3* 8.8 8.4*   CO2 20 24 26   BUN 22 27 21   CR 1.68* 1.91* 2.16*   * 155* 200*     CBC  Recent Labs  Lab 02/13/17  0723 02/12/17  0809 02/11/17  1603 02/11/17  0651   WBC 4.8 6.0 10.8 7.3   RBC 4.22* 4.12* 4.34* 4.72   HGB 13.1* 13.2* 13.6 15.0   HCT 39.8* 38.9* 41.9 44.9   MCV 94 94 97 95   MCH 31.0 32.0 31.3 31.8   MCHC 32.9 33.9 32.5 33.4   RDW 15.9* 16.3* 16.2* 16.3*   * 116* 135* 124*     INR  Recent Labs  Lab 02/13/17  0723 02/12/17  0809 02/11/17  0651 02/08/17  0016   INR 2.71* 2.38* 1.70* 2.34*         A/P: 67 year old male with complex PMHx including CHF, ICM s/p LVAD, and CKD who was admitted with acute appendicitis. Patient stable on conservative management. WBC continue to be normal with minimal pain. No n/v.   - Okay to advance diet to FLD today given no nausea and normal WBC.   - Continue abx for a total of 10 days. IV abx while in the hospital. Switch to oral abx 24 hrs before dc. Ok to dc on Levaquin/flagyl.     Patient seen and discussed with staff and chief resident.     Shyann Whiteside MD  PGY-1, General Surgery  [phone]

## 2017-02-14 LAB
ANION GAP SERPL CALCULATED.3IONS-SCNC: 10 MMOL/L (ref 3–14)
BUN SERPL-MCNC: 15 MG/DL (ref 7–30)
CALCIUM SERPL-MCNC: 8.8 MG/DL (ref 8.5–10.1)
CHLORIDE SERPL-SCNC: 114 MMOL/L (ref 94–109)
CO2 SERPL-SCNC: 20 MMOL/L (ref 20–32)
CREAT SERPL-MCNC: 1.73 MG/DL (ref 0.66–1.25)
ERYTHROCYTE [DISTWIDTH] IN BLOOD BY AUTOMATED COUNT: 15.9 % (ref 10–15)
GFR SERPL CREATININE-BSD FRML MDRD: 40 ML/MIN/1.7M2
GLUCOSE SERPL-MCNC: 95 MG/DL (ref 70–99)
HCT VFR BLD AUTO: 39.4 % (ref 40–53)
HGB BLD-MCNC: 13.1 G/DL (ref 13.3–17.7)
INR PPP: 2.67 (ref 0.86–1.14)
LDH SERPL L TO P-CCNC: 877 U/L (ref 85–227)
MCH RBC QN AUTO: 31.3 PG (ref 26.5–33)
MCHC RBC AUTO-ENTMCNC: 33.2 G/DL (ref 31.5–36.5)
MCV RBC AUTO: 94 FL (ref 78–100)
PLATELET # BLD AUTO: 126 10E9/L (ref 150–450)
POTASSIUM SERPL-SCNC: 4 MMOL/L (ref 3.4–5.3)
RBC # BLD AUTO: 4.18 10E12/L (ref 4.4–5.9)
SODIUM SERPL-SCNC: 143 MMOL/L (ref 133–144)
WBC # BLD AUTO: 5.7 10E9/L (ref 4–11)

## 2017-02-14 PROCEDURE — 21400003 ZZH R&B CCU CRITICAL UMMC

## 2017-02-14 PROCEDURE — 83615 LACTATE (LD) (LDH) ENZYME: CPT | Performed by: INTERNAL MEDICINE

## 2017-02-14 PROCEDURE — 85610 PROTHROMBIN TIME: CPT | Performed by: INTERNAL MEDICINE

## 2017-02-14 PROCEDURE — A9270 NON-COVERED ITEM OR SERVICE: HCPCS | Mod: GY | Performed by: INTERNAL MEDICINE

## 2017-02-14 PROCEDURE — 25000132 ZZH RX MED GY IP 250 OP 250 PS 637: Mod: GY | Performed by: INTERNAL MEDICINE

## 2017-02-14 PROCEDURE — 36415 COLL VENOUS BLD VENIPUNCTURE: CPT | Performed by: INTERNAL MEDICINE

## 2017-02-14 PROCEDURE — 99232 SBSQ HOSP IP/OBS MODERATE 35: CPT | Mod: GC | Performed by: INTERNAL MEDICINE

## 2017-02-14 PROCEDURE — 85027 COMPLETE CBC AUTOMATED: CPT | Performed by: INTERNAL MEDICINE

## 2017-02-14 PROCEDURE — 80048 BASIC METABOLIC PNL TOTAL CA: CPT | Performed by: INTERNAL MEDICINE

## 2017-02-14 PROCEDURE — 25900017 H RX MED GY IP 259 OP 259 PS 637: Mod: GY | Performed by: INTERNAL MEDICINE

## 2017-02-14 RX ORDER — LEVOFLOXACIN 750 MG/1
750 TABLET, FILM COATED ORAL
Status: DISCONTINUED | OUTPATIENT
Start: 2017-02-14 | End: 2017-02-15 | Stop reason: HOSPADM

## 2017-02-14 RX ORDER — METRONIDAZOLE 500 MG/1
500 TABLET ORAL EVERY 8 HOURS SCHEDULED
Status: DISCONTINUED | OUTPATIENT
Start: 2017-02-14 | End: 2017-02-15 | Stop reason: HOSPADM

## 2017-02-14 RX ORDER — LEVOFLOXACIN 750 MG/1
750 TABLET, FILM COATED ORAL
Qty: 3 TABLET | Refills: 0 | Status: SHIPPED
Start: 2017-02-14 | End: 2017-02-20

## 2017-02-14 RX ORDER — METRONIDAZOLE 375 MG/1
375 CAPSULE ORAL 2 TIMES DAILY
Status: DISCONTINUED | OUTPATIENT
Start: 2017-02-14 | End: 2017-02-14

## 2017-02-14 RX ORDER — GUAIFENESIN/DEXTROMETHORPHAN 100-10MG/5
5 SYRUP ORAL EVERY 4 HOURS PRN
Qty: 560 ML | Refills: 3 | Status: SHIPPED
Start: 2017-02-14 | End: 2017-01-01

## 2017-02-14 RX ORDER — METRONIDAZOLE 375 MG/1
500 CAPSULE ORAL EVERY 8 HOURS
Status: DISCONTINUED | OUTPATIENT
Start: 2017-02-14 | End: 2017-02-14

## 2017-02-14 RX ADMIN — ACETAMINOPHEN 650 MG: 325 TABLET, FILM COATED ORAL at 05:55

## 2017-02-14 RX ADMIN — FINASTERIDE 5 MG: 5 TABLET, FILM COATED ORAL at 09:35

## 2017-02-14 RX ADMIN — GUAIFENESIN AND DEXTROMETHORPHAN: 100; 10 SYRUP ORAL at 09:36

## 2017-02-14 RX ADMIN — IRON 325 MG: 65 TABLET ORAL at 09:36

## 2017-02-14 RX ADMIN — METOPROLOL SUCCINATE 25 MG: 25 TABLET, FILM COATED, EXTENDED RELEASE ORAL at 19:37

## 2017-02-14 RX ADMIN — ACETAMINOPHEN 650 MG: 325 TABLET, FILM COATED ORAL at 22:19

## 2017-02-14 RX ADMIN — LOSARTAN POTASSIUM 25 MG: 25 TABLET, FILM COATED ORAL at 09:37

## 2017-02-14 RX ADMIN — Medication 1 MG: at 22:19

## 2017-02-14 RX ADMIN — OXYCODONE HYDROCHLORIDE AND ACETAMINOPHEN 500 MG: 500 TABLET ORAL at 09:37

## 2017-02-14 RX ADMIN — OLOPATADINE HYDROCHLORIDE 1 DROP: 1 SOLUTION/ DROPS OPHTHALMIC at 19:43

## 2017-02-14 RX ADMIN — AZELASTINE HYDROCHLORIDE 1 DROP: 0.5 SOLUTION/ DROPS INTRAOCULAR at 19:43

## 2017-02-14 RX ADMIN — LEVOFLOXACIN 750 MG: 750 TABLET, FILM COATED ORAL at 09:34

## 2017-02-14 RX ADMIN — THIAMINE HCL (VITAMIN B1) 50 MG TABLET 100 MG: at 09:51

## 2017-02-14 RX ADMIN — METRONIDAZOLE 500 MG: 500 TABLET ORAL at 22:19

## 2017-02-14 RX ADMIN — SIMETHICONE CHEW TAB 80 MG 80 MG: 80 TABLET ORAL at 09:51

## 2017-02-14 RX ADMIN — PANTOPRAZOLE SODIUM 40 MG: 40 TABLET, DELAYED RELEASE ORAL at 09:51

## 2017-02-14 RX ADMIN — LEVETIRACETAM 750 MG: 750 TABLET, FILM COATED ORAL at 19:37

## 2017-02-14 RX ADMIN — TAMSULOSIN HYDROCHLORIDE 0.4 MG: 0.4 CAPSULE ORAL at 09:35

## 2017-02-14 RX ADMIN — METOPROLOL SUCCINATE 50 MG: 50 TABLET, FILM COATED, EXTENDED RELEASE ORAL at 09:37

## 2017-02-14 RX ADMIN — POTASSIUM CHLORIDE 20 MEQ: 750 TABLET, EXTENDED RELEASE ORAL at 14:07

## 2017-02-14 RX ADMIN — AZELASTINE HYDROCHLORIDE 1 DROP: 0.5 SOLUTION/ DROPS INTRAOCULAR at 09:34

## 2017-02-14 RX ADMIN — SIMETHICONE CHEW TAB 80 MG 80 MG: 80 TABLET ORAL at 17:30

## 2017-02-14 RX ADMIN — OLOPATADINE HYDROCHLORIDE 1 DROP: 1 SOLUTION/ DROPS OPHTHALMIC at 09:34

## 2017-02-14 RX ADMIN — SIMETHICONE CHEW TAB 80 MG 80 MG: 80 TABLET ORAL at 19:38

## 2017-02-14 RX ADMIN — WARFARIN SODIUM 4.5 MG: 2.5 TABLET ORAL at 17:30

## 2017-02-14 RX ADMIN — GUAIFENESIN AND DEXTROMETHORPHAN 5 ML: 100; 10 SYRUP ORAL at 19:39

## 2017-02-14 RX ADMIN — SIMETHICONE CHEW TAB 80 MG 80 MG: 80 TABLET ORAL at 13:14

## 2017-02-14 RX ADMIN — ALLOPURINOL 100 MG: 100 TABLET ORAL at 09:36

## 2017-02-14 RX ADMIN — ATORVASTATIN CALCIUM 10 MG: 10 TABLET, FILM COATED ORAL at 19:37

## 2017-02-14 RX ADMIN — GUAIFENESIN AND DEXTROMETHORPHAN 5 ML: 100; 10 SYRUP ORAL at 14:07

## 2017-02-14 RX ADMIN — METRONIDAZOLE 500 MG: 500 TABLET ORAL at 13:14

## 2017-02-14 RX ADMIN — LEVETIRACETAM 750 MG: 750 TABLET, FILM COATED ORAL at 09:51

## 2017-02-14 NOTE — PROGRESS NOTES
Care Coordinator Progress Note     Admission Date/Time:  2/11/2017  Attending MD:  Jaleel Lebron MD   Data  Chart reviewed, discussed with interdisciplinary team.   Patient was admitted for:    Appendicitis  LVAD (left ventricular assist device) present (H).    Concerns with insurance coverage for discharge needs: None identified.  Current Living Situation: Patient lives with family.  Support System: Supportive and Involved family  Services Involved: DME, Home Care and PCA, [facility].   Transportation: pt will need stretcher ride home.   Barriers to Discharge: acute illness  Coordination of Care and Referrals: Provided patient/family with options for resumption of home care.    Assessment  Per pt's preference, this writer spoke to pt with daughter present to interpret. Pt's daughter said that they want to resume pt's home care with [facility] and pt will need a stretcher ride home. Pt's daughter said that pt has 8 hrs/day of PCA services and his daughters are his PCA's; pt's daughters also provide 24/7 care for pt. Pt has a home hospital bed, w/c, and Jay lift.   Intervention:    Arrangements made with [facility] (Ph: [phone] Fax: [phone]) for RN obdulia post hospitalization, Palliative Care Program, resumption of previous home care orders, assess vital signs, respiratory and cardiac status, activity tolerance, hydration, nutritional status, med set up and management, lab draws.    Plan  Anticipated Discharge Date:  2/15/17   Anticipated Discharge Plan:  Discharge to home with home care.   JAYLEN KLEIN RN BSN  Care Coordinator

## 2017-02-14 NOTE — PROGRESS NOTES
[facility]  Patient is currently open to home care services with [facility].  The patient is currently receiving RN services.  [facility]  and team have been notified of patient admission.  [facility] liaison will continue to follow patient during stay.  If appropriate provide orders to resume home care at time of discharge.    Katlyn Quintana RN  [facility] Liaison

## 2017-02-14 NOTE — PLAN OF CARE
Problem: Goal Outcome Summary  Goal: Goal Outcome Summary  TARAN: pt with h/y of [facility] due to CM with HM 2 LVAD,  multiple CVA with left sided hemiplegia, pump thrombosis, here due to RLQ abdominal pain, CT evidence non -perforated appendicitis on ertapenem per MD note.Pt on regular diet. VSS, paced with HR at 59-62, afebrile, O2 sat at 100% on RA. Family at bedside helping with pt.care.  Potassium 3.7 replaced per replacement protocol.Tylenol given for headache with effect.  Plan: continue to monitor,notify MD with any concerns.

## 2017-02-14 NOTE — PROGRESS NOTES
General Surgery Progress note    S:  No overnight events.  Pt seen at bedside resting comfortably.  No acute complaints.  Tolerating regular diet. Denies fevers, chills. No n/v.     O:  Vitals:    02/13/17 2019 02/14/17 0300 02/14/17 0751 02/14/17 1043   BP: 116/89 118/76 (!) 115/91 115/84   BP Location: Right arm  Right arm Right arm   Pulse:       Resp: 18 18 18 18   Temp: 97.6  F (36.4  C)  97.5  F (36.4  C) 97.5  F (36.4  C)   TempSrc: Oral  Oral Oral   SpO2: 100% 97% 98% 98%   Weight:       Height:           A&Ox3, NAD  Breathing non-labored  Soft, ND, minimally ttp in RLQ      BMP  Recent Labs  Lab 02/14/17  0859 02/13/17  0723 02/12/17  0809 02/11/17  0651    140 138 139   POTASSIUM 4.0 3.7 3.5 4.2   CHLORIDE 114* 111* 109 106   JOSÉ 8.8 8.4* 8.3* 8.8   CO2 20 20 20 24   BUN 15 16 22 27   CR 1.73* 1.68* 1.68* 1.91*   GLC 95 118* 196* 155*     CBC  Recent Labs  Lab 02/14/17  0859 02/13/17  0723 02/12/17  0809 02/11/17  1603   WBC 5.7 4.8 6.0 10.8   RBC 4.18* 4.22* 4.12* 4.34*   HGB 13.1* 13.1* 13.2* 13.6   HCT 39.4* 39.8* 38.9* 41.9   MCV 94 94 94 97   MCH 31.3 31.0 32.0 31.3   MCHC 33.2 32.9 33.9 32.5   RDW 15.9* 15.9* 16.3* 16.2*   * 121* 116* 135*     INR  Recent Labs  Lab 02/14/17  0742 02/13/17  0723 02/12/17  0809 02/11/17  0651   INR 2.67* 2.71* 2.38* 1.70*           A/P: 67 year old male with complex PMHx including CHF, ICM s/p LVAD, and CKD who was admitted with acute appendicitis. Patient stable on conservative management. WBC continue to be normal with minimal pain. Tolerating regular diet.   - Continue abx for a total of 10 days. IV abx while in the hospital. Switch to oral abx 24 hrs before dc. Ok to dc on Levaquin/flagyl.  - Follow up with Dr. Meyer in surgery clinic in 4-6 weeks.     Surgery will sign off. Please call with any questions or concerns.     Shyann Whiteside MD  PGY-1, General Surgery  [phone]

## 2017-02-14 NOTE — PLAN OF CARE
Problem: Goal Outcome Summary  Goal: Goal Outcome Summary  Outcome: Improving  Patient admitted with RLQ abdominal pain-appendicitis. Receiving oral antibiotics (switched today from IV). Denies pain. VSS, paced, LVAD HM2 numbers stable no alarms. Daughter at bedside and supportive. Bedrest patient to work with therapy today. Left sided hemiplegia. Adequate appetite, voiding, loose BM. LVAD dressing changed x1. Replaced K per protocol. Continue to monitor and notify MD with changes/concerns.

## 2017-02-15 VITALS
HEART RATE: 61 BPM | DIASTOLIC BLOOD PRESSURE: 82 MMHG | WEIGHT: 98.5 LBS | BODY MASS INDEX: 14.93 KG/M2 | TEMPERATURE: 97.6 F | RESPIRATION RATE: 12 BRPM | SYSTOLIC BLOOD PRESSURE: 111 MMHG | HEIGHT: 68 IN | OXYGEN SATURATION: 96 %

## 2017-02-15 LAB
ANION GAP SERPL CALCULATED.3IONS-SCNC: 10 MMOL/L (ref 3–14)
BUN SERPL-MCNC: 18 MG/DL (ref 7–30)
CALCIUM SERPL-MCNC: 8.8 MG/DL (ref 8.5–10.1)
CHLORIDE SERPL-SCNC: 111 MMOL/L (ref 94–109)
CO2 SERPL-SCNC: 18 MMOL/L (ref 20–32)
CREAT SERPL-MCNC: 1.67 MG/DL (ref 0.66–1.25)
ERYTHROCYTE [DISTWIDTH] IN BLOOD BY AUTOMATED COUNT: 15.8 % (ref 10–15)
GFR SERPL CREATININE-BSD FRML MDRD: 41 ML/MIN/1.7M2
GLUCOSE SERPL-MCNC: 119 MG/DL (ref 70–99)
HCT VFR BLD AUTO: 39.4 % (ref 40–53)
HGB BLD-MCNC: 13 G/DL (ref 13.3–17.7)
INR PPP: 2.61 (ref 0.86–1.14)
MCH RBC QN AUTO: 31.1 PG (ref 26.5–33)
MCHC RBC AUTO-ENTMCNC: 33 G/DL (ref 31.5–36.5)
MCV RBC AUTO: 94 FL (ref 78–100)
PLATELET # BLD AUTO: 148 10E9/L (ref 150–450)
POTASSIUM SERPL-SCNC: 4 MMOL/L (ref 3.4–5.3)
RBC # BLD AUTO: 4.18 10E12/L (ref 4.4–5.9)
SODIUM SERPL-SCNC: 140 MMOL/L (ref 133–144)
WBC # BLD AUTO: 5.2 10E9/L (ref 4–11)

## 2017-02-15 PROCEDURE — 80048 BASIC METABOLIC PNL TOTAL CA: CPT | Performed by: INTERNAL MEDICINE

## 2017-02-15 PROCEDURE — 85610 PROTHROMBIN TIME: CPT | Performed by: INTERNAL MEDICINE

## 2017-02-15 PROCEDURE — 25000132 ZZH RX MED GY IP 250 OP 250 PS 637: Mod: GY | Performed by: INTERNAL MEDICINE

## 2017-02-15 PROCEDURE — A9270 NON-COVERED ITEM OR SERVICE: HCPCS | Mod: GY | Performed by: INTERNAL MEDICINE

## 2017-02-15 PROCEDURE — 36415 COLL VENOUS BLD VENIPUNCTURE: CPT | Performed by: INTERNAL MEDICINE

## 2017-02-15 PROCEDURE — 85027 COMPLETE CBC AUTOMATED: CPT | Performed by: INTERNAL MEDICINE

## 2017-02-15 PROCEDURE — 99238 HOSP IP/OBS DSCHRG MGMT 30/<: CPT | Mod: GC | Performed by: INTERNAL MEDICINE

## 2017-02-15 RX ORDER — METRONIDAZOLE 500 MG/1
500 TABLET ORAL EVERY 8 HOURS
Qty: 18 TABLET | Refills: 0 | Status: SHIPPED
Start: 2017-02-15 | End: 2017-02-21

## 2017-02-15 RX ADMIN — OXYCODONE HYDROCHLORIDE 5 MG: 5 TABLET ORAL at 02:52

## 2017-02-15 RX ADMIN — PANTOPRAZOLE SODIUM 40 MG: 40 TABLET, DELAYED RELEASE ORAL at 10:09

## 2017-02-15 RX ADMIN — LEVETIRACETAM 750 MG: 750 TABLET, FILM COATED ORAL at 10:09

## 2017-02-15 RX ADMIN — METOPROLOL SUCCINATE 50 MG: 50 TABLET, FILM COATED, EXTENDED RELEASE ORAL at 10:06

## 2017-02-15 RX ADMIN — SIMETHICONE CHEW TAB 80 MG 80 MG: 80 TABLET ORAL at 10:06

## 2017-02-15 RX ADMIN — SIMETHICONE CHEW TAB 80 MG 80 MG: 80 TABLET ORAL at 13:45

## 2017-02-15 RX ADMIN — ACETAMINOPHEN 650 MG: 325 TABLET, FILM COATED ORAL at 10:05

## 2017-02-15 RX ADMIN — METRONIDAZOLE 500 MG: 500 TABLET ORAL at 06:09

## 2017-02-15 RX ADMIN — THIAMINE HCL (VITAMIN B1) 50 MG TABLET 100 MG: at 10:08

## 2017-02-15 RX ADMIN — LOSARTAN POTASSIUM 25 MG: 25 TABLET, FILM COATED ORAL at 10:08

## 2017-02-15 RX ADMIN — OXYCODONE HYDROCHLORIDE AND ACETAMINOPHEN 500 MG: 500 TABLET ORAL at 10:09

## 2017-02-15 RX ADMIN — METRONIDAZOLE 500 MG: 500 TABLET ORAL at 13:45

## 2017-02-15 RX ADMIN — IRON 325 MG: 65 TABLET ORAL at 10:09

## 2017-02-15 RX ADMIN — ALLOPURINOL 100 MG: 100 TABLET ORAL at 10:10

## 2017-02-15 RX ADMIN — TAMSULOSIN HYDROCHLORIDE 0.4 MG: 0.4 CAPSULE ORAL at 10:09

## 2017-02-15 NOTE — PLAN OF CARE
Problem: Goal Outcome Summary  Goal: Goal Outcome Summary  Outcome: Improving  lvad within determined parameters. Pt continues to need assistance with all ADL.

## 2017-02-15 NOTE — PROGRESS NOTES
[facility]  Cardiology 2 Service  Progress Note  February 14, 2017    Assessment and Plan  Sohan Rendon is a 67 year old man with chronic systolic heart failure 2/2 to ischemic CM s/p HM II LVAD placement in [place] now Destination Therapy due to multiple CVA's with residual left sided weakness c/b recurrent hemolysis and pump thrombus, who was admitted for 24 hr history of RLQ abdominal pain found to have CT evidence of non-perforated appendicitis.  Clinically improved with conservative management of antibiotics.     Summary of Changes Today:   -Potential discharge tomorrow pending clinical course and WBC in AM   -Transition IV Ertapenem to PO Levaquin and Flagyl, per Surgery recs  -Discontinue telemetry   -Upon discharge: Will need f/u with PCP to restart Lasix and check labs (WBC,BMP)  -Continue warfarin    #Appendicitis: Presented with RLQ pain, tenderness at McBurney's point. Afebrile, hemodynamically stable. Normal WBC. CT scan with non-perforated appendicitis. There is also a nonobstructing nephrolithiasis in both kidneys as well.   --Surgery consulted, appreciate recs  --Ertapenem renally dosed per Pharmacy; will attempt conservative management. If this fails, pt agreeable to appendectomy.   --Oxycodone 5mg q4h prn      #Chronic systolic heart failure secondary to ischemic CM:  Stage D, NYHA Class III B. S/p HM II LVAD placement in [place] now DT due to multiple CVAs with residual left sided weakness complicated by recurrent hemolysis and pump thrombus. Focus of care remains on quality of life.    --Not on ACEi/ARB because he did not tolerate it  --LVAD settings: Flow 4.1 lpm, PI 5.7, Speed 8800 rpm, Power 5.2 capps  --Atorvastin 10mg PO daily  --Holding home Lasix 20mg PO daily   --Metoprolol 50mg daily, 25mg qhs   --Losartan 25mg daily   --SCD prophylaxis: ICD  --% BiV pacing:   N/A  --Fluid status: euvolemic. Avoid dehydration  --INR goal: 2.5-3 due to CVAs and  pump thrombus.  Coumadin clinic to adjust.  --Antiplatelet agents:   --NSAID use: No     #ICD interrogation: Last interrogated on 1/11 with intrinsic rhythm is 65 bpm. Normal ICD function. No arrhythmias recorded. OptiVol thoracic impedance slightly above baseline.   85%.     #Cough: Likely viral  --Discontinue diphenhydramine, initiate Robitussen     #HTN: currently controlled, no issues with hypotension.   *Home regimen: Lasix 20mg once daily, Losartan 25mg PO tablet, Metoprolol as above   --Continue Losartan  --PCP to re-initiate lasix after discharge     #Multiple CVAs with residual and recurrent hemolysis/thrombus:  No significant rehab potential.  Continue coumadin and ASA.  Not candidate for pump exchange.  Continue to focus on symptom management.        #History of seizure: Continue Levetiracetam 750mg PO BID      #Cough: Evaluated by ED on 2/7, felt most likely viral  --Robitussen PRN (will also schedule doses to ensure that pt is offered this med, but ok to decline)      #Gout: Allopurinol 100mg PO daily      #Constipation: Bisocodyl 5mg PO daily prn      #BPH: Finasteride 5mg PO daily, flomax .4mg daily      #Iron Deficiency Anemia: Ferrous sulfate 325mg PO daily with breakfast and Vitamin C  #Insomnia: Meltaonin 1mg qhs     ##Other  -DVT Prophylaxis: Continue warfarin   -Bowel: Senna-docusate and Dulcolax   - FEN: Advancing diet as tolerated   - IVF: Full liquid diet for lunch, ok to progress to Regular diet if tolerates  - Electrolyte Protocol: Yes  - Telemetry: Yes  - Family: Daughters at bedside updated  - Code status: Full Code     Disposition: Likely home tomorrow on PO antibiotics     Jess Moore MD  Internal Medicine, PGY2  Pager [phone]    ~~~~~~~~~~~~~~~~~~~~~~~    Subjective:   No complaints. Tolerated regular diet. Still with mild abdominal pain to palpation. No fevers, chills, chest pain or sob. Daughter at bedside, questions addressed.     Pertinent Medications:  Levofloxacin  "and Metronidazole   Warfarin   Losartan (Home med held on admit, restarted on 2/13)   Metoprolol 25mg qhs  Will initiate Metoprolol 50mg qAM on 2/14   Levetiracetem    Examination:  BP (!) 134/99 (BP Location: Right arm)  Pulse 59  Temp 98  F (36.7  C) (Oral)  Resp 18  Ht 1.727 m (5' 8\")  Wt 44.7 kg (98 lb 8 oz)  SpO2 99%  BMI 14.98 kg/m2  GEN: A & Ox3, very pleasant Martiniquais man, chronically ill appearing male, NAD, cooperative and conversational   HEENT: PERRL, no scleral icterus, mucus membranes moist  NECK: Supple, no LAD, JVP not elevated   RESP: CTA bilaterally, no wheezing, no crackles, no increased work of breathing   CV: Regular, normal rate, S1 and S2 without m/r/g   ABD: Two scars in center of abdomen (per daughters, reportedly from prior feeding tube), Soft, minimally tender to palpation in RLQ (improved mild tenderness/guarding) no HSM, +BS, +minimal guarding  EXT: No peripheral edema, warm/well perfused   NEURO: CN II-XII intact, normal 5/5 strength in all extremitites  SKIN: Normal skin turgor, no rash on limited exam    Data & Hemodynamics reviewed in EPIC. I personally saw and examined this patient, reviewed imaging and laboratory studies, confirmed physical examination and discussed results and plan with patient and or family.  I personally saw and examined this patient, reviewed imaging and laboratory studies, confirmed physical examination and discussed results and plan with patient and or family.  "

## 2017-02-15 NOTE — PLAN OF CARE
Problem: Goal Outcome Summary  Goal: Goal Outcome Summary  No acute changes. Vital signs at baseline, LVAD numbes WNL and no alarms. Telemetry discontinued per MD order. Daughter and son at bedside and very helpful with cares. Patient requires cares for left side weakness from CVA. Patient got oxycodone at 0245 to help him sleep with good results. Continue cares, patient may discharge today.

## 2017-02-15 NOTE — DISCHARGE SUMMARY
[facility], [place]    Discharge Summary  Cardiology    Date of Admission:  2/11/2017  Date of Discharge:  2/15/2017  4:26 PM  Discharging Provider: Jess Moore    Discharge Diagnoses   Active Problems:    Appendicitis    PCP Follow-up:   Follow-up with Primary Care Physician in 1 week for repeat CBC, BMP and assessment of abdominal pain/toleration of PO intake. Will also need to assess timing of restarting home lasix 20mg daily.     History of Present Illness   Sohan Rendon is a 67 year old man with chronic systolic heart failure 2/2 to ischemic CM s/p HM II LVAD placement in [place] now Destination Therapy due to multiple CVA's with residual left sided weakness c/b recurrent hemolysis and pump thrombus, CKD, who was admitted for 24 hr history of RLQ abdominal pain found to have CT evidence of non-perforated appendicitis.     Hospital Course   #Appendicitis: Presented with RLQ pain, tenderness at McBurney's point. Afebrile, hemodynamically stable. Normal WBC. CT scan with non-perforated appendicitis. General Surgery was consulted with plan for trial of conservative management. Pt was initiated on Ertapenem IV (renally dosed) with clinical improvement and later transitioned to PO antibiotics levofloxacin and metronidazole with plan to complete 10 day course total. At time of discharge pt had very minimal tenderness to palpation on RLQ, tolerated regular diet. Throughout admission, pt never spiked a fever or developed leukocytosis.   --Follow-up with Primary Care Physician in 1 week for repeat CBC, BMP and assessment of abdominal pain/toleration of PO intake   --Follow-up in General Surgery Clinic in 4-6 weeks with Dr. Meyer    #Chronic systolic heart failure secondary to ischemic CM:  Stage D, NYHA Class III B. S/p HM II LVAD placement in [place] now DT due to multiple CVAs with residual left sided weakness complicated by recurrent hemolysis and pump thrombus. Focus of  care remains on quality of life.    --Not on ACEi/ARB because he did not tolerate it in past   --LVAD settings: Flow 4.1 lpm, PI 5.7, Speed 8800 rpm, Power 5.2 capps  --Atorvastin 10mg PO daily  --Holding home Lasix 20mg PO daily until followup with PCP   --Metoprolol 50mg daily, 25mg qhs   --Losartan 25mg daily   --SCD prophylaxis: ICD  --% BiV pacing:   N/A  --Fluid status: euvolemic. Avoid dehydration  --INR goal: 2.5-3 due to CVAs and pump thrombus.  Coumadin clinic to adjust.  --Antiplatelet agents:   --NSAID use: No      #ICD interrogation: Last interrogated on 1/11 with intrinsic rhythm is 65 bpm. Normal ICD function. No arrhythmias recorded. OptiVol thoracic impedance slightly above baseline.   85%.      #HTN: currently controlled, no issues with hypotension.   *Home regimen: Lasix 20mg once daily, Losartan 25mg PO tablet, Metoprolol as above   --Continue Losartan   --PCP to re-initiate lasix after discharge      #Multiple CVAs with residual and recurrent hemolysis/thrombus:  Current baseline with left hemiparesis. No significant rehab potential.  Continue coumadin and ASA.  Not candidate for pump exchange.  Continue to focus on symptom management.         #History of seizure: Continue Levetiracetam 750mg PO BID       #Cough: Evaluated by ED on 2/7, felt most likely viral. Pt did not like diphenhydramine, initiated Robitussen for symptom control.   --Robitussen PRN (will also schedule doses to ensure that pt is offered this med, but ok to decline)       #Gout: Allopurinol 100mg PO daily       #Constipation: Bisocodyl 5mg PO daily prn       #BPH: Finasteride 5mg PO daily, flomax .4mg daily       #Iron Deficiency Anemia: Ferrous sulfate 325mg PO daily with breakfast and Vitamin C  #Insomnia: Meltaonin 1mg qhs    Significant Results and Procedures   2/11 CT Abd/Pelvis  Impression:   1. Findings concerning for nonperforated appendicitis, including  dilation of the appendix and periappendiceal  inflammatory change.  2. Nonobstructing nephrolithiasis in both kidneys. No ureteral or  bladder stone.    2/11 CXR      Impression:   1. No acute cardiopulmonary abnormality.  2. Implantable cardiac defibrillator and LVAD are unchanged.    Code Status   Full Code    Primary Care Physician   Thomas Dill    Physical Exam:   Gen: [place] man, chronically ill appearing, Resting comfortably, in no acute distress  Head: Normocephalic, atraumatic   Eyes: SUZANNE, EOMI   ENT: MMM, No sinus tenderness, oropharynx clear with no erythema or exudates, no LAD, neck supple, no JVD   CV: LVAD hum, drive line covered with bandage   Resp: Non-labored breathing on Room air, CTAB with no wheezing or rhonchi   Abd: Two scars in center of abdomen (per daughters, reportedly from prior feeding tube) +BS, soft, NTND, no costovertebral angle tenderness, no peritoneal signs   Ext: Warm, well-perfused, no edema    Time Spent on this Encounter   I, Jess Moore, personally saw the patient today and spent greater than 30 minutes discharging this patient.    Discharge Disposition   Discharged to home  Condition at discharge: Stable    Consultations This Hospital Stay   VASCULAR ACCESS CARE ADULT IP CONSULT  MEDICATION HISTORY IP PHARMACY CONSULT  PHARMACY TO DOSE WARFARIN    Discharge Orders     Medication Therapy Management Referral     Home care nursing referral     General Surg Adult Referral     Reason for your hospital stay   You were hospitalized due to inflammation and infection of your appendix (an organ in your abdomen). You were started on IV Antibiotics with improvement in your symptoms.     Adult [facility] Follow-up and recommended labs and tests   Follow up with primary care provider, Thomas Dill, within 7 days for hospital follow- up.  The following labs/tests are recommended: CBC, BMP, and restarting of lasix 20mg IV if volume status and kidney function allows.    Follow-up with General Surgery in 1 month.        Appointments on [place] and/or [facility] (with [facility] or [facility] provider or service). Call [phone] if you haven't heard regarding these appointments within 7 days of discharge.     Activity   Your activity upon discharge: activity as tolerated     When to contact your care team   Call your primary doctor if you have any of the following: temperature greater than 100.6F, increased shortness of breath, chest pain, or increased abdominal pain. Also let your doctor know if you are having sudden nausea/vomiting and cannot eat food.     Full Code     Diet   Follow this diet upon discharge: Orders Placed This Encounter     Regular Diet Adult       Discharge Medications   Discharge Medication List as of 2/15/2017  1:49 PM      START taking these medications    Details   metroNIDAZOLE (FLAGYL) 500 MG tablet Take 1 tablet (500 mg) by mouth every 8 hours for 6 days, Disp-18 tablet, R-0, Fax      guaiFENesin-dextromethorphan (ROBITUSSIN DM) 100-10 MG/5ML syrup Take 5 mLs by mouth every 4 hours as needed for cough, Disp-560 mL, R-3, Fax      levofloxacin (LEVAQUIN) 750 MG tablet Take 1 tablet (750 mg) by mouth every 48 hours for 6 days, Disp-3 tablet, R-0, Fax         CONTINUE these medications which have NOT CHANGED    Details   losartan (COZAAR) 25 MG tablet Take 1 tablet (25 mg) by mouth daily, Disp-45 tablet, R-3, E-Prescribe      allopurinol (ZYLOPRIM) 100 MG tablet Take 1 tablet (100 mg) by mouth daily, Disp-90 tablet, R-3, E-Prescribe      atorvastatin (LIPITOR) 10 MG tablet Take 1 tablet (10 mg) by mouth daily, Disp-30 tablet, R-5, E-Prescribe      !! order for DME Equipment being ordered: Wheelchair, manual, with elevated leg rests and tilt in space back.  Please fax to [facility]; I called them 11/26/16 and they requested the new order.  Face to face is in my 10/26/16 note.Disp-1 each, R-0, Local Print      pantoprazole (PROTONIX) 40 MG enteric coated tablet Take 1 tablet (40 mg) by mouth daily, Disp-180  tablet, R-3, E-Prescribe      warfarin (COUMADIN) 3 MG tablet Take 3mg by mouth on Mondays and Thursdays. Take 4.5mg by mouth all other days of the week., Disp-180 tablet, R-3, Historical      tamsulosin (FLOMAX) 0.4 MG 24 hr capsule Take 1 capsule (0.4 mg) by mouth daily, Disp-90 capsule, R-3, E-Prescribe      senna-docusate (SENOKOT-S;PERICOLACE) 8.6-50 MG per tablet Take 1-2 tablets by mouth 2 times daily as needed for constipation, Disp-60 tablet, R-3, E-Prescribe      !! order for DME Equipment being ordered: Wheelchair, manual.Disp-1 each, R-0, Local Print      !! order for DME Equipment being ordered: Hospital Bed, fully electric.Disp-1 each, R-0, Local Print      azelastine (OPTIVAR) 0.05 % SOLN Apply 1 drop to eye 2 times daily, Disp-1 Bottle, R-11, E-Prescribe      loratadine (CLARITIN) 10 MG tablet Take 1 tablet (10 mg) by mouth daily, Disp-90 tablet, R-3, E-Prescribe      levETIRAcetam (KEPPRA) 750 MG tablet Take 1 tablet (750 mg) by mouth 2 times daily, Disp-180 tablet, R-3, E-Prescribe      ferrous sulfate (IRON) 325 (65 FE) MG tablet Take 1 tablet (325 mg) by mouth daily (with breakfast), Disp-90 tablet, R-3, E-Prescribe      ascorbic acid (VITAMIN C) 500 MG tablet Take 1 tablet (500 mg) by mouth daily With iron pill, Disp-90 tablet, R-3, E-Prescribe      !! metoprolol (TOPROL-XL) 25 MG 24 hr tablet Take 1 tablet (25 mg) by mouth every evening, Disp-90 tablet, R-3, E-Prescribe      !! metoprolol (TOPROL-XL) 50 MG 24 hr tablet Take 1 tablet (50 mg) by mouth daily, Disp-90 tablet, R-3, E-Prescribe      blood glucose (NO BRAND SPECIFIED) lancets standard Use to test blood sugar 2 times daily or as directed.Disp-1 Box, [id number]      blood glucose monitoring (NO BRAND SPECIFIED) test strip Use to test blood sugar 2 times daily or as directed.Disp-1 Box, [id number]      simethicone (MYLICON) 80 MG chewable tablet Take 1 tablet (80 mg) by mouth 4 times daily, Disp-180 tablet, R-5, Fax      !!  order for DME Equipment being ordered: Reclining manual w/c with bilateral elevating leg rests.Disp-1 each, R-0, Local Print      oxyCODONE (ROXICODONE) 5 MG immediate release tablet Take 1 tablet (5 mg) by mouth every 4 hours as needed for moderate to severe pain, Disp-90 tablet, R-0, Local Print      nystatin (MYCOSTATIN) 266883 UNIT/GM POWD Apply to affected areas of skin in the groin and on the scrotum three times a day as needed.Disp-60 g, [id number]      BD SHARPS CONTAINER HOME MISC 1 each as needed, Disp-1 each, R-1, E-Prescribe      !! order for DME Equipment being ordered: Bilateral leg supports for manual wheelchair.Disp-2 each, R-0, Local Print      Thiamine HCl (VITAMIN B-1) 100 MG TABS Take 1 tablet (100 mg) by mouth daily, Disp-90 tablet, R-3, E-Prescribe      olopatadine (PATANOL) 0.1 % ophthalmic solution Place 1 drop into both eyes 2 times daily, Disp-1 Bottle, R-11, E-Prescribe      blood glucose monitoring (NO BRAND SPECIFIED) lancets Use to test blood sugars 2 times daily or as directed.Disp-1 Box, [id number]      !! ORDER FOR DME Equipment being ordered: Lift chair.    Please see indications and face-to-face encounter details in 2/3/15 Office Visit note.Disp-1 Device, R-0, Local Print      acetaminophen (TYLENOL) 325 MG tablet Take 2 tablets (650 mg) by mouth every 6 hours, Disp-100 tablet, R-0, Fax      melatonin 1 MG TABS Take 1 tablet (1 mg) by mouth At Bedtime, Disp-30 tablet, R-0, Local Print      bisacodyl (DULCOLAX) 5 MG EC tablet Take 1 tablet (5 mg) by mouth daily as needed for constipation, Disp-20 tablet, R-1, Local Print      finasteride (PROSCAR) 5 MG tablet Take 1 tablet (5 mg) by mouth daily, Disp-30 tablet, R-0, E-Prescribe      carboxymethylcellulose (REFRESH PLUS) 0.5 % SOLN Place 1 drop into the right eye 3 times daily as needed for other (eye irritation or discomfort.), Disp-30 mL, R-3, E-Prescribe      nitroglycerin (NITROSTAT) 0.4 MG SL tablet Place 1 tablet (0.4  mg) under the tongue every 5 minutes as needed for chest pain, Disp-30 tablet, R-1, E-Prescribe      calcium carbonate-vitamin D 500-400 MG-UNIT TABS tablt Take 1 tablet by mouth 2 times daily, Disp-180 tablet, R-3, E-Prescribe       !! - Potential duplicate medications found. Please discuss with provider.      STOP taking these medications       diphenhydrAMINE HCl 12.5 MG/5ML SYRP Comments:   Reason for Stopping:         furosemide (LASIX) 20 MG tablet Comments:   Reason for Stopping:         benzonatate (TESSALON) 100 MG capsule Comments:   Reason for Stopping:             Allergies   Allergies   Allergen Reactions     Seasonal Allergies      Data   Most Recent 3 CBC's:  Recent Labs   Lab Test  02/15/17   0823  02/14/17   0859  02/13/17   0723   WBC  5.2  5.7  4.8   HGB  13.0*  13.1*  13.1*   MCV  94  94  94   PLT  148*  126*  121*      Most Recent 3 BMP's:  Recent Labs   Lab Test  02/15/17   0823  02/14/17   0859  02/13/17   0723   NA  140  143  140   POTASSIUM  4.0  4.0  3.7   CHLORIDE  111*  114*  111*   CO2  18*  20  20   BUN  18  15  16   CR  1.67*  1.73*  1.68*   ANIONGAP  10  10  9   JOSÉ  8.8  8.8  8.4*   GLC  119*  95  118*     Most Recent 2 LFT's:  Recent Labs   Lab Test  02/12/17   0809  02/11/17   0651   AST  38  52*   ALT  21  26   ALKPHOS  98  115   BILITOTAL  1.0  0.8     Most Recent INR's and Anticoagulation Dosing History:  Anticoagulation Dose History     Recent Dosing and Labs Latest Ref Rng & Units 2/6/2017 2/8/2017 2/11/2017 2/12/2017 2/13/2017 2/14/2017 2/15/2017    ZZ IMS TEMPLATE - - - 4.5 mg - 4.5 mg 4.5 mg -    INR 0.86 - 1.14 2.20 2.34(H) 1.70(H) 2.38(H) 2.71(H) 2.67(H) 2.61(H)    INR 0.86 - 1.14 - - - - - - -    INR Point of Care 0.86 - 1.14 - - - - - - -          I personally saw and examined this patient, reviewed imaging and laboratory studies, confirmed physical examination and discussed results and plan with patient and or family.

## 2017-02-15 NOTE — PLAN OF CARE
Problem: Goal Outcome Summary  Goal: Goal Outcome Summary  Pt will d/c when [facility] arrives to pick him up. AVS reviewed with multiple family members and meds to be given yet today were highlighted. Family picked up meds from d/c pharmacy. Pt stable and reporting minimal abdominal pain. Family aware of what events would necessitate bringing pt back to ER.

## 2017-02-15 NOTE — PROGRESS NOTES
Care Coordinator- Discharge Planning     Admission Date/Time:  2/11/2017  Attending MD:  Jaleel Lebron MD   Data  Date of initial CC assessment:  2/14/17  Chart reviewed, discussed with interdisciplinary team.   Patient was admitted for:   1. Acute appendicitis with localized peritonitis    2. Appendicitis    3. LVAD (left ventricular assist device) present    4. Chronic cough    5. Acute appendicitis, unspecified acute appendicitis type    Assessment  Full assessment completed in previous note  Coordination of Care and Referrals: Provided patient/family with options for resumption of home care and transportation home.  Plan  Anticipated Discharge Date:  2/15/17  Stretcher ride home arranged with [facility] (Ph: [phone]) today at 3 PM.  Anticipated Discharge Plan:  Discharge to home with home care.     CTS Handoff completed:  YES    JAYLEN KLEIN RN BSN  Care Coordinator

## 2017-02-15 NOTE — PROGRESS NOTES
Pt and Pt's family did not have any VAD related questions/concerns. Emotional support offered. Pt will d/c today. This writer will call Pt to check in tomorrow.

## 2017-02-16 ENCOUNTER — TELEPHONE (OUTPATIENT)
Dept: FAMILY MEDICINE | Facility: CLINIC | Age: 67
End: 2017-02-16

## 2017-02-16 ENCOUNTER — CARE COORDINATION (OUTPATIENT)
Dept: CARDIOLOGY | Facility: CLINIC | Age: 67
End: 2017-02-16

## 2017-02-16 ENCOUNTER — ANTICOAGULATION THERAPY VISIT (OUTPATIENT)
Dept: ANTICOAGULATION | Facility: CLINIC | Age: 67
End: 2017-02-16

## 2017-02-16 ENCOUNTER — CARE COORDINATION (OUTPATIENT)
Dept: GERIATRIC MEDICINE | Facility: CLINIC | Age: 67
End: 2017-02-16

## 2017-02-16 DIAGNOSIS — K35.209 ACUTE APPENDICITIS WITH GENERALIZED PERITONITIS: Primary | ICD-10-CM

## 2017-02-16 DIAGNOSIS — Z79.01 LONG-TERM (CURRENT) USE OF ANTICOAGULANTS: ICD-10-CM

## 2017-02-16 DIAGNOSIS — Z95.811 LVAD (LEFT VENTRICULAR ASSIST DEVICE) PRESENT (H): ICD-10-CM

## 2017-02-16 NOTE — PROGRESS NOTES
Patient has LVAD so they will be followed by Cardiology for Post DC follow up        VAD Tracking   Implant date: 10/9/12   Implant Information   Implant: LVAD   LVAD: Heartmate II   Episode Care Team

## 2017-02-16 NOTE — MR AVS SNAPSHOT
Sohan [facility]   2/16/2017   Anticoagulation Therapy Visit    Description:  67 year old male   Provider:  Sarah Gill, RN   Department:  [facility]           INR as of 2/16/2017     Today's INR       Anticoagulation Summary as of 2/16/2017     INR goal    Prior goal 1.8-2.5   Today's INR    Full instructions 3 mg on Mon, Thu; 4.5 mg all other days   Next INR check 2/17/2017    Indications   LVAD (left ventricular assist device) present [Z95.811]  Long-term (current) use of anticoagulants [Z79.01] [Z79.01]         February 2017 Details    Sun Mon Tue Wed Thu Fri Sat        1               2               3               4                 5               6               7               8               9               10               11                 12               13               14               15               16      3 mg   See details      17            18                 19               20               21               22               23               24               25                 26               27               28                    Date Details   02/16 This INR check       Date of next INR:  2/17/2017         How to take your warfarin dose     To take:  3 mg Take 1 of the 3 mg tablets.    To take:  4.5 mg Take 1.5 of the 3 mg tablets.

## 2017-02-16 NOTE — PROGRESS NOTES
Sohan was hospitalized 2/11 to 2/15 with appendicitis. Was started on IV ertapenem and transitioned to Levaquin and metronidazole to continue for a total of 10 days. On discharge, 6 days remaining. Inpatient warfarin doses added to calendar (it appears they were following a goal range of 2.5-3.0, but his current range is 1.8 to 2.3). Will have an INR done on 2/17 by home care. Per his usual warfarin dose, he is scheduled to take a lower (3 mg) dose today, so will not make a change. May need dose reduction for Flagyl.

## 2017-02-16 NOTE — TELEPHONE ENCOUNTER
Called Taylor back and left vmail with okay for orders requested per RN protocol.   Also asked for a return call to see if RN is able to draw labs on the patient if Dr. Dill orders any.    According to hospital discharge note from 2/15, hospitalist recommended f/u with PCP, a CBC and a BMP within one week.  Dr. Dill is scheduled to see pt on 2/23.    Routing to Dr. Dill to please advise if he would like RN to order labs above.    Ekaterina Nielson RN

## 2017-02-16 NOTE — TELEPHONE ENCOUNTER
Left Taylor ESCOBAR a vmail. If she calls back and is able to draw labs on pt, we need a CBC and a BMP around 2/22. If she can't do this, we will need to have Client Services draw.    Ekaterina Nielson RN

## 2017-02-16 NOTE — TELEPHONE ENCOUNTER
Taylor called with [facility] requesting orders. Looking for HC recert for 2w1 with 1 PRN, Also needing INR tomorrow and HC resumption of care orders 3w1, 2w3, 5PRN.     Taylor can be reached with questions.

## 2017-02-16 NOTE — PROGRESS NOTES
CM notified client was discharged to home from the hospital 02/15. CM left a message with client's daughter to check in. Requested a call back to update AMANDA.   Nena Salazar RN  [facility], [place]   [phone]

## 2017-02-17 ENCOUNTER — ANTICOAGULATION THERAPY VISIT (OUTPATIENT)
Dept: ANTICOAGULATION | Facility: CLINIC | Age: 67
End: 2017-02-17

## 2017-02-17 ENCOUNTER — HOSPITAL ENCOUNTER (OUTPATIENT)
Dept: LAB | Facility: CLINIC | Age: 67
Discharge: HOME | End: 2017-02-17
Payer: MEDICARE

## 2017-02-17 DIAGNOSIS — Z95.811 LVAD (LEFT VENTRICULAR ASSIST DEVICE) PRESENT (H): ICD-10-CM

## 2017-02-17 DIAGNOSIS — Z79.01 LONG-TERM (CURRENT) USE OF ANTICOAGULANTS: ICD-10-CM

## 2017-02-17 LAB
ANION GAP SERPL CALCULATED.3IONS-SCNC: 13 MMOL/L (ref 3–14)
BASOPHILS # BLD AUTO: 0 10E9/L (ref 0–0.2)
BASOPHILS NFR BLD AUTO: 0.5 %
BUN SERPL-MCNC: 19 MG/DL (ref 7–30)
CALCIUM SERPL-MCNC: 8.6 MG/DL (ref 8.5–10.1)
CHLORIDE SERPL-SCNC: 110 MMOL/L (ref 94–109)
CO2 SERPL-SCNC: 17 MMOL/L (ref 20–32)
CREAT SERPL-MCNC: 1.65 MG/DL (ref 0.66–1.25)
DIFFERENTIAL METHOD BLD: ABNORMAL
EOSINOPHIL # BLD AUTO: 0.5 10E9/L (ref 0–0.7)
EOSINOPHIL NFR BLD AUTO: 9 %
ERYTHROCYTE [DISTWIDTH] IN BLOOD BY AUTOMATED COUNT: 15.8 % (ref 10–15)
GFR SERPL CREATININE-BSD FRML MDRD: 42 ML/MIN/1.7M2
GLUCOSE SERPL-MCNC: 152 MG/DL (ref 70–99)
HCT VFR BLD AUTO: 41.2 % (ref 40–53)
HGB BLD-MCNC: 14.1 G/DL (ref 13.3–17.7)
IMM GRANULOCYTES # BLD: 0.1 10E9/L (ref 0–0.4)
IMM GRANULOCYTES NFR BLD: 2.2 %
INR PPP: 2.4
LYMPHOCYTES # BLD AUTO: 1 10E9/L (ref 0.8–5.3)
LYMPHOCYTES NFR BLD AUTO: 16.5 %
MCH RBC QN AUTO: 32.4 PG (ref 26.5–33)
MCHC RBC AUTO-ENTMCNC: 34.2 G/DL (ref 31.5–36.5)
MCV RBC AUTO: 95 FL (ref 78–100)
MONOCYTES # BLD AUTO: 0.6 10E9/L (ref 0–1.3)
MONOCYTES NFR BLD AUTO: 9.7 %
NEUTROPHILS # BLD AUTO: 3.6 10E9/L (ref 1.6–8.3)
NEUTROPHILS NFR BLD AUTO: 62.1 %
NRBC # BLD AUTO: 0 10*3/UL
NRBC BLD AUTO-RTO: 0 /100
PLATELET # BLD AUTO: 164 10E9/L (ref 150–450)
POTASSIUM SERPL-SCNC: 4 MMOL/L (ref 3.4–5.3)
RBC # BLD AUTO: 4.35 10E12/L (ref 4.4–5.9)
SODIUM SERPL-SCNC: 140 MMOL/L (ref 133–144)
WBC # BLD AUTO: 5.9 10E9/L (ref 4–11)

## 2017-02-17 PROCEDURE — 80048 BASIC METABOLIC PNL TOTAL CA: CPT

## 2017-02-17 PROCEDURE — 85025 COMPLETE CBC W/AUTO DIFF WBC: CPT

## 2017-02-17 NOTE — MR AVS SNAPSHOT
Sohan Rendon   2/17/2017   Anticoagulation Therapy Visit    Description:  67 year old male   Provider:  Sarah Osborne, RN   Department:  [facility]           INR as of 2/17/2017     Today's INR 2.4      Anticoagulation Summary as of 2/17/2017     INR goal    Prior goal 1.8-2.5   Today's INR 2.4   Full instructions 2/17: 3 mg; 2/19: 3 mg; Otherwise 3 mg on Mon, Thu; 4.5 mg all other days   Next INR check 2/20/2017    Indications   LVAD (left ventricular assist device) present [Z95.811]  Long-term (current) use of anticoagulants [Z79.01] [Z79.01]         February 2017 Details    Sun Mon Tue Wed Thu Fri Sat        1               2               3               4                 5               6               7               8               9               10               11                 12               13               14               15               16               17      3 mg   See details      18      4.5 mg           19      3 mg         20            21               22               23               24               25                 26               27               28                    Date Details   02/17 This INR check       Date of next INR:  2/20/2017         How to take your warfarin dose     To take:  3 mg Take 1 of the 3 mg tablets.    To take:  4.5 mg Take 1.5 of the 3 mg tablets.

## 2017-02-17 NOTE — PROGRESS NOTES
ANTICOAGULATION FOLLOW-UP CLINIC VISIT    Patient Name:  Sohan Rendon  Date:  2/17/2017  Contact Type:  Telephone    SUBJECTIVE:     Patient Findings     Positives Hospital admission (hospitalized 2/11/17 - 2/15/17 with appendicitis), Antibiotic use or infection (on levaquin and metronidazole)           OBJECTIVE    INR   Date Value Ref Range Status   02/17/2017 2.4  Final     Chromogenic Factor 10   Date Value Ref Range Status   08/10/2014 24 (L) 70 - 130 % Final     Comment:     Therapeutic Range:  A Chromogenic Factor 10 level of approximately 20-40%   inversely correlates with an INR of 2-3 for patients receiving Warfarin.   Chromogenic Factor 10 levels below 20% indicate an INR greater than 3 and   levels above 40% indicate an INR less than 2.       Factor 2 Assay   Date Value Ref Range Status   07/21/2014 25 (L) 60 - 140 % Final     Comment:     Analyte Specific Reagents (ASRs) are used in many laboratory tests necessary   for   standard medical care and generally do not require FDA approval.  This test   was   developed and its performance characteristics determined by [facility].  It has not been cleared or approved by   the [place] Food and Drug Administration.         ASSESSMENT / PLAN  INR assessment THER    Recheck INR In: 3 DAYS    INR Location [facility] INR      Anticoagulation Summary as of 2/17/2017     INR goal    Prior goal 1.8-2.5   Today's INR 2.4   Maintenance plan 3 mg (3 mg x 1) on Mon, Thu; 4.5 mg (3 mg x 1.5) all other days   Full instructions 2/17: 3 mg; 2/19: 3 mg; Otherwise 3 mg on Mon, Thu; 4.5 mg all other days   Weekly total 28.5 mg   Plan last modified Sarah Gill RN (12/12/2016)   Next INR check 2/20/2017   Priority INR   Target end date Indefinite    Indications   LVAD (left ventricular assist device) present [Z95.811]  Long-term (current) use of anticoagulants [Z79.01] [Z79.01]         Anticoagulation Episode Summary     INR  check location     Preferred lab     Send INR reminders to [facility]    Comments Goal Range is 1.8-2.3  [facility] comes out to draw INR  Daughter Almaz Ph [phone]      Anticoagulation Care Providers     Provider Role Specialty Phone number    Evon Lemons MD Responsible Cardiology [phone]            See the Encounter Report to view Anticoagulation Flowsheet and Dosing Calendar (Go to Encounters tab in chart review, and find the Anticoagulation Therapy Visit)    Spoke with Yancy MCKINNON.      Sarah Osborne RN

## 2017-02-17 NOTE — TELEPHONE ENCOUNTER
Spoke with [facility] regarding lab order.  She states she has drawn patient in the past so is willing to draw labs ordered.      Concetta Burr RN

## 2017-02-20 ENCOUNTER — ANTICOAGULATION THERAPY VISIT (OUTPATIENT)
Dept: ANTICOAGULATION | Facility: CLINIC | Age: 67
End: 2017-02-20

## 2017-02-20 DIAGNOSIS — Z79.01 LONG-TERM (CURRENT) USE OF ANTICOAGULANTS: ICD-10-CM

## 2017-02-20 DIAGNOSIS — Z95.811 LVAD (LEFT VENTRICULAR ASSIST DEVICE) PRESENT (H): ICD-10-CM

## 2017-02-20 LAB
ANION GAP SERPL CALCULATED.3IONS-SCNC: 10 MMOL/L (ref 3–14)
BASOPHILS # BLD AUTO: 0 10E9/L (ref 0–0.2)
BASOPHILS NFR BLD AUTO: 0.5 %
BUN SERPL-MCNC: 24 MG/DL (ref 7–30)
CALCIUM SERPL-MCNC: 8.4 MG/DL (ref 8.5–10.1)
CHLORIDE SERPL-SCNC: 112 MMOL/L (ref 94–109)
CO2 SERPL-SCNC: 20 MMOL/L (ref 20–32)
CREAT SERPL-MCNC: 1.62 MG/DL (ref 0.66–1.25)
DIFFERENTIAL METHOD BLD: ABNORMAL
EOSINOPHIL # BLD AUTO: 0.4 10E9/L (ref 0–0.7)
EOSINOPHIL NFR BLD AUTO: 6.3 %
ERYTHROCYTE [DISTWIDTH] IN BLOOD BY AUTOMATED COUNT: 16.1 % (ref 10–15)
GFR SERPL CREATININE-BSD FRML MDRD: 43 ML/MIN/1.7M2
GLUCOSE SERPL-MCNC: 112 MG/DL (ref 70–99)
HCT VFR BLD AUTO: 42.2 % (ref 40–53)
HGB BLD-MCNC: 13.8 G/DL (ref 13.3–17.7)
IMM GRANULOCYTES # BLD: 0.1 10E9/L (ref 0–0.4)
IMM GRANULOCYTES NFR BLD: 1.5 %
INR PPP: 2.3
LYMPHOCYTES # BLD AUTO: 1 10E9/L (ref 0.8–5.3)
LYMPHOCYTES NFR BLD AUTO: 15 %
MCH RBC QN AUTO: 31.5 PG (ref 26.5–33)
MCHC RBC AUTO-ENTMCNC: 32.7 G/DL (ref 31.5–36.5)
MCV RBC AUTO: 96 FL (ref 78–100)
MONOCYTES # BLD AUTO: 0.8 10E9/L (ref 0–1.3)
MONOCYTES NFR BLD AUTO: 12.4 %
NEUTROPHILS # BLD AUTO: 4.2 10E9/L (ref 1.6–8.3)
NEUTROPHILS NFR BLD AUTO: 64.3 %
NRBC # BLD AUTO: 0 10*3/UL
NRBC BLD AUTO-RTO: 0 /100
PLATELET # BLD AUTO: 160 10E9/L (ref 150–450)
POTASSIUM SERPL-SCNC: 4 MMOL/L (ref 3.4–5.3)
RBC # BLD AUTO: 4.38 10E12/L (ref 4.4–5.9)
SODIUM SERPL-SCNC: 142 MMOL/L (ref 133–144)
WBC # BLD AUTO: 6.5 10E9/L (ref 4–11)

## 2017-02-20 PROCEDURE — 85025 COMPLETE CBC W/AUTO DIFF WBC: CPT

## 2017-02-20 PROCEDURE — 80048 BASIC METABOLIC PNL TOTAL CA: CPT

## 2017-02-20 NOTE — PROGRESS NOTES
ANTICOAGULATION FOLLOW-UP CLINIC VISIT    Patient Name:  Sohan Rendon  Date:  2/20/2017  Contact Type:  Telephone    SUBJECTIVE:     Patient Findings     Positives Antibiotic use or infection (2/20-levaquin done, and 2/22 Flagyl done.)           OBJECTIVE    INR   Date Value Ref Range Status   02/20/2017 2.3  Final     Chromogenic Factor 10   Date Value Ref Range Status   08/10/2014 24 (L) 70 - 130 % Final     Comment:     Therapeutic Range:  A Chromogenic Factor 10 level of approximately 20-40%   inversely correlates with an INR of 2-3 for patients receiving Warfarin.   Chromogenic Factor 10 levels below 20% indicate an INR greater than 3 and   levels above 40% indicate an INR less than 2.       Factor 2 Assay   Date Value Ref Range Status   07/21/2014 25 (L) 60 - 140 % Final     Comment:     Analyte Specific Reagents (ASRs) are used in many laboratory tests necessary   for   standard medical care and generally do not require FDA approval.  This test   was   developed and its performance characteristics determined by [facility].  It has not been cleared or approved by   the [place] Food and Drug Administration.         ASSESSMENT / PLAN  INR assessment THER    Recheck INR In: 1 WEEK    INR Location [facility]      Anticoagulation Summary as of 2/20/2017     INR goal    Prior goal 1.8-2.5   Today's INR 2.3   Maintenance plan 3 mg (3 mg x 1) on Mon, Wed, Fri; 4.5 mg (3 mg x 1.5) all other days   Full instructions 3 mg on Mon, Wed, Fri; 4.5 mg all other days   Weekly total 27 mg   Plan last modified Dafne Sanders RN (2/20/2017)   Next INR check 2/27/2017   Priority INR   Target end date Indefinite    Indications   LVAD (left ventricular assist device) present [Z95.811]  Long-term (current) use of anticoagulants [Z79.01] [Z79.01]         Anticoagulation Episode Summary     INR check location     Preferred lab     Send INR reminders to [facility]    Comments  Goal Range is 1.8-2.3  [facility] comes out to draw INR  Daughter Almaz Ph [phone]      Anticoagulation Care Providers     Provider Role Specialty Phone number    Evon Lemons MD Responsible Cardiology [phone]            See the Encounter Report to view Anticoagulation Flowsheet and Dosing Calendar (Go to Encounters tab in chart review, and find the Anticoagulation Therapy Visit)    Spoke with Jonah [facility] nurse.    Dafne Sanders RN

## 2017-02-20 NOTE — MR AVS SNAPSHOT
[facility]   2/20/2017   Anticoagulation Therapy Visit    Description:  67 year old male   Provider:  Dafne Sanders RN   Department:  [facility]           INR as of 2/20/2017     Today's INR 2.3      Anticoagulation Summary as of 2/20/2017     INR goal    Prior goal 1.8-2.5   Today's INR 2.3   Full instructions 3 mg on Mon, Wed, Fri; 4.5 mg all other days   Next INR check 2/27/2017    Indications   LVAD (left ventricular assist device) present [Z95.811]  Long-term (current) use of anticoagulants [Z79.01] [Z79.01]         February 2017 Details    Sun Mon Tue Wed Thu Fri Sat        1               2               3               4                 5               6               7               8               9               10               11                 12               13               14               15               16               17               18                 19               20      3 mg   See details      21      4.5 mg         22      3 mg         23      4.5 mg         24      3 mg         25      4.5 mg           26      4.5 mg         27            28                    Date Details   02/20 This INR check       Date of next INR:  2/27/2017         How to take your warfarin dose     To take:  3 mg Take 1 of the 3 mg tablets.    To take:  4.5 mg Take 1.5 of the 3 mg tablets.

## 2017-02-21 ENCOUNTER — HOSPITAL ENCOUNTER (EMERGENCY)
Facility: CLINIC | Age: 67
Discharge: HOME | End: 2017-02-21
Attending: EMERGENCY MEDICINE | Admitting: EMERGENCY MEDICINE
Payer: MEDICARE

## 2017-02-21 ENCOUNTER — APPOINTMENT (OUTPATIENT)
Dept: GENERAL RADIOLOGY | Facility: CLINIC | Age: 67
End: 2017-02-21
Attending: EMERGENCY MEDICINE
Payer: MEDICARE

## 2017-02-21 VITALS
OXYGEN SATURATION: 95 % | HEART RATE: 59 BPM | HEIGHT: 68 IN | WEIGHT: 182 LBS | DIASTOLIC BLOOD PRESSURE: 94 MMHG | RESPIRATION RATE: 18 BRPM | SYSTOLIC BLOOD PRESSURE: 126 MMHG | TEMPERATURE: 97.8 F | BODY MASS INDEX: 27.58 KG/M2

## 2017-02-21 DIAGNOSIS — Z95.811 HEART REPLACED BY HEART ASSIST DEVICE (H): ICD-10-CM

## 2017-02-21 DIAGNOSIS — I69.854: ICD-10-CM

## 2017-02-21 DIAGNOSIS — I10 ESSENTIAL HYPERTENSION, MALIGNANT: ICD-10-CM

## 2017-02-21 DIAGNOSIS — K29.00 ACUTE GASTRITIS WITHOUT HEMORRHAGE, UNSPECIFIED GASTRITIS TYPE: ICD-10-CM

## 2017-02-21 DIAGNOSIS — K20.90 ESOPHAGITIS, UNSPECIFIED: ICD-10-CM

## 2017-02-21 DIAGNOSIS — I50.9 HEART FAILURE, UNSPECIFIED (H): ICD-10-CM

## 2017-02-21 LAB
ALBUMIN SERPL-MCNC: 3.3 G/DL (ref 3.4–5)
ALP SERPL-CCNC: 84 U/L (ref 40–150)
ALT SERPL W P-5'-P-CCNC: 16 U/L (ref 0–70)
ANION GAP SERPL CALCULATED.3IONS-SCNC: 10 MMOL/L (ref 3–14)
APTT PPP: 46 SEC (ref 22–37)
AST SERPL W P-5'-P-CCNC: 22 U/L (ref 0–45)
BASOPHILS # BLD AUTO: 0 10E9/L (ref 0–0.2)
BASOPHILS NFR BLD AUTO: 0.4 %
BILIRUB SERPL-MCNC: 0.5 MG/DL (ref 0.2–1.3)
BUN SERPL-MCNC: 26 MG/DL (ref 7–30)
CALCIUM SERPL-MCNC: 8.6 MG/DL (ref 8.5–10.1)
CHLORIDE SERPL-SCNC: 110 MMOL/L (ref 94–109)
CO2 SERPL-SCNC: 20 MMOL/L (ref 20–32)
CREAT SERPL-MCNC: 1.58 MG/DL (ref 0.66–1.25)
DIFFERENTIAL METHOD BLD: ABNORMAL
EOSINOPHIL # BLD AUTO: 0.4 10E9/L (ref 0–0.7)
EOSINOPHIL NFR BLD AUTO: 4.8 %
ERYTHROCYTE [DISTWIDTH] IN BLOOD BY AUTOMATED COUNT: 16.6 % (ref 10–15)
GFR SERPL CREATININE-BSD FRML MDRD: 44 ML/MIN/1.7M2
GLUCOSE SERPL-MCNC: 176 MG/DL (ref 70–99)
HCT VFR BLD AUTO: 41.9 % (ref 40–53)
HGB BLD-MCNC: 14 G/DL (ref 13.3–17.7)
IMM GRANULOCYTES # BLD: 0.1 10E9/L (ref 0–0.4)
IMM GRANULOCYTES NFR BLD: 1.2 %
INR PPP: 2.29 (ref 0.86–1.14)
LYMPHOCYTES # BLD AUTO: 1.1 10E9/L (ref 0.8–5.3)
LYMPHOCYTES NFR BLD AUTO: 14.2 %
MCH RBC QN AUTO: 31.5 PG (ref 26.5–33)
MCHC RBC AUTO-ENTMCNC: 33.4 G/DL (ref 31.5–36.5)
MCV RBC AUTO: 94 FL (ref 78–100)
MONOCYTES # BLD AUTO: 0.7 10E9/L (ref 0–1.3)
MONOCYTES NFR BLD AUTO: 9.5 %
NEUTROPHILS # BLD AUTO: 5.4 10E9/L (ref 1.6–8.3)
NEUTROPHILS NFR BLD AUTO: 69.9 %
NRBC # BLD AUTO: 0.1 10*3/UL
NRBC BLD AUTO-RTO: 1 /100
PLATELET # BLD AUTO: 175 10E9/L (ref 150–450)
POTASSIUM SERPL-SCNC: 4 MMOL/L (ref 3.4–5.3)
PROT SERPL-MCNC: 7.2 G/DL (ref 6.8–8.8)
RBC # BLD AUTO: 4.44 10E12/L (ref 4.4–5.9)
SODIUM SERPL-SCNC: 139 MMOL/L (ref 133–144)
WBC # BLD AUTO: 7.7 10E9/L (ref 4–11)

## 2017-02-21 PROCEDURE — 99284 EMERGENCY DEPT VISIT MOD MDM: CPT | Mod: 25 | Performed by: EMERGENCY MEDICINE

## 2017-02-21 PROCEDURE — 99285 EMERGENCY DEPT VISIT HI MDM: CPT | Mod: 25 | Performed by: EMERGENCY MEDICINE

## 2017-02-21 PROCEDURE — 80053 COMPREHEN METABOLIC PANEL: CPT | Performed by: EMERGENCY MEDICINE

## 2017-02-21 PROCEDURE — 93010 ELECTROCARDIOGRAM REPORT: CPT | Mod: Z6 | Performed by: EMERGENCY MEDICINE

## 2017-02-21 PROCEDURE — 85730 THROMBOPLASTIN TIME PARTIAL: CPT | Performed by: EMERGENCY MEDICINE

## 2017-02-21 PROCEDURE — 25000125 ZZHC RX 250: Performed by: EMERGENCY MEDICINE

## 2017-02-21 PROCEDURE — A9270 NON-COVERED ITEM OR SERVICE: HCPCS | Mod: GY | Performed by: EMERGENCY MEDICINE

## 2017-02-21 PROCEDURE — 25000132 ZZH RX MED GY IP 250 OP 250 PS 637: Mod: GY | Performed by: EMERGENCY MEDICINE

## 2017-02-21 PROCEDURE — 85025 COMPLETE CBC W/AUTO DIFF WBC: CPT | Performed by: EMERGENCY MEDICINE

## 2017-02-21 PROCEDURE — 85610 PROTHROMBIN TIME: CPT | Performed by: EMERGENCY MEDICINE

## 2017-02-21 PROCEDURE — 93005 ELECTROCARDIOGRAM TRACING: CPT | Performed by: EMERGENCY MEDICINE

## 2017-02-21 PROCEDURE — 71020 XR CHEST 2 VW: CPT

## 2017-02-21 RX ORDER — LIDOCAINE 40 MG/G
CREAM TOPICAL
Status: DISCONTINUED | OUTPATIENT
Start: 2017-02-21 | End: 2017-02-21 | Stop reason: HOSPADM

## 2017-02-21 RX ORDER — ACETAMINOPHEN 500 MG
1000 TABLET ORAL ONCE
Status: COMPLETED | OUTPATIENT
Start: 2017-02-21 | End: 2017-02-21

## 2017-02-21 RX ADMIN — ACETAMINOPHEN 1000 MG: 500 TABLET, FILM COATED ORAL at 04:20

## 2017-02-21 RX ADMIN — LIDOCAINE HYDROCHLORIDE 30 ML: 20 SOLUTION ORAL; TOPICAL at 04:11

## 2017-02-21 ASSESSMENT — ENCOUNTER SYMPTOMS
VOMITING: 0
ABDOMINAL PAIN: 0
SHORTNESS OF BREATH: 0
FEVER: 0
NAUSEA: 1

## 2017-02-21 NOTE — ED AVS SNAPSHOT
[facility], [place], Emergency Department    [address] [place] [address]    Phone:  [phone]                                       Sohan Rendon   MRN: [id number]    Department:  [facility], Emergency Department   Date of Visit:  2/21/2017           After Visit Summary Signature Page     I have received my discharge instructions, and my questions have been answered. I have discussed any challenges I see with this plan with the nurse or doctor.    ..........................................................................................................................................  Patient/Patient Representative Signature      ..........................................................................................................................................  Patient Representative Print Name and Relationship to Patient    ..................................................               ................................................  Date                                            Time    ..........................................................................................................................................  Reviewed by Signature/Title    ...................................................              ..............................................  Date                                                            Time

## 2017-02-21 NOTE — ED PROVIDER NOTES
History     Chief Complaint   Patient presents with     Chest Wall Pain     HPI  Sohan Rendon is a 67 year old male with history of MCA, CHF with LVAD and esophagitis who presents with chest pain.  Describes pain as in his epigastrium/right chest.  Radiates to his back.  Had a fullness and feeling of bloating prior.  No shortness of breath.  No fevers or chills.  Was recently in for appendicitis that was treated conservatively with IV antibiotic.  States abdominal pain is feeling better.  Is continuing home antibiotics.  He did have a stroke with dense left hemiparesis.  He is no longer on the transplant list.  He is here with his daughter.  No trauma.  No vomiting.  No diarrhea.  No blood in emesis or stool.  He is on blood thinners.  Has had intraventricular thrombus in the past.    I have reviewed the Medications, Allergies, Past Medical and Surgical History, and Social History in the [employer] system.  Past Medical History   Diagnosis Date     Acute right MCA stroke (H) 6/2013     With L sided hemiparesis      Anemia of chronic disease      Baseline Hb 8-9     BPH (benign prostatic hyperplasia)      CHF (congestive heart failure), NYHA class IV (H) 10/9/2012     s/p HeartMate II.  Was on milrinone and dobutamine prior to LVAD      CKD (chronic kidney disease)      Baseline Cr=     Clostridium difficile colitis 12/2012      Dysphagia      PEG tube placed in 2012.  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  Normal carotid duplex 3/2014.       Esophagitis 12/2012     EGD with esophagitis and multiple douenal ulcers     Fracture of femoral neck, right, closed 2/3/2015     Presumed pathologic as patient is non-weight-bearing and suffered no trauma.  Family declined operative repair during hospital stay 1/23 - 1/30/15.     Gastric and duodenal angiodysplasia with hemorrhage 6/18/2015     GERD (gastroesophageal reflux  disease)      Gout      HTN (hypertension)      LDL=59 (3/29/2014)     Hyperlipaemia      Ischemic cardiomyopathy      Mitral regurgitation      s/p MVR with Carbomedics ring      Myocardial infarction (H) 1998     In [facility] without intervention      Nonsenile cataract      BE     PFO (patent foramen ovale)      s/p closure (10/9/2012)     TB lung, latent      s/p 9 months INH in 2012        Past Surgical History   Procedure Laterality Date     Insert ventricular assist device left (heartmate ii)  10/9/2012     H insert icd  2/10/2011     And pacemaker.  Not BiV     Repair valve mitral  2/9/2012     28 mm Carbomedics 2/3 ring      C cabg, vein, five  2001     Repair patent foramen ovale  10/9/2012     Ir gastro jejunostomy tube placement       Esophagoscopy, gastroscopy, duodenoscopy (egd), combined N/A 6/10/2015     Procedure: COMBINED ESOPHAGOSCOPY, GASTROSCOPY, DUODENOSCOPY (EGD);  Surgeon: Edu Jenkins MD;  Location: [facility]     Esophagoscopy, gastroscopy, duodenoscopy (egd), combined N/A 6/15/2015     Procedure: COMBINED ESOPHAGOSCOPY, GASTROSCOPY, DUODENOSCOPY (EGD);  Surgeon: Yury Bonner MD;  Location: [facility] OR     Phacoemulsification clear cornea with standard intraocular lens implant Right 11/19/2015     Procedure: PHACOEMULSIFICATION CLEAR CORNEA WITH STANDARD INTRAOCULAR LENS IMPLANT;  Surgeon: Violeta Cosme MD;  Location: [facility] OR     Cataract iol, rt/lt Right 11/19/2015       Family History   Problem Relation Age of Onset     Family History Negative No family hx of      Glaucoma No family hx of      Macular Degeneration No family hx of      CANCER No family hx of      no skin cancer       Social History   Substance Use Topics     Smoking status: Former Smoker     Smokeless tobacco: Former User     Alcohol use No      Review of Systems   Constitutional: Negative for fever.   Respiratory: Negative for shortness of breath.    Cardiovascular: Positive for chest pain.   Gastrointestinal: Positive for  "nausea. Negative for abdominal pain and vomiting.   All other systems reviewed and are negative.      Physical Exam   BP: (!) 113/94  Pulse: 77  Temp: 97.8  F (36.6  C)  Resp: 18  Height: 172.7 cm (5' 8\")  Weight: 82.6 kg (182 lb)  SpO2: 98 %  Physical Exam  Vital Signs and Nursing Notes Reviewed.  General:  NAD  HEENT: Oropharynx clear.  No obvious signs of trauma.  PERRL.  EOMI  Neck: Supple.  No lymphadenopathy.  Cardiac: Lvad.     Lungs: Clear bilaterally.  Normal respiratory rate.    Abdomen:  Soft, Nontender, no rebound or guarding.  Back: No CVA tenderness.  Skin:  No rash.  No diaphoresis  Extremities:  No cyanosis, clubbing, or edema.  Pulse:  Symmetric in bilateral upper and lower extremities.    ED Course     ED Course     Procedures             EKG Interpretation:      Interpreted by Abilio Pruitt  Time reviewed: 350  Symptoms at time of EKG: chest pain   Rhythm: paced  Rate: 62  Axis: Left Axis Deviation  Ectopy: none  Conduction: Paced  ST Segments/ T Waves: no st changes  Q Waves: none  Comparison to prior: Unchanged from 2/11/17    Clinical Impression: Paced rhythm with no acute changes.                 Critical Care time:  none               Labs Ordered and Resulted from Time of ED Arrival Up to the Time of Departure from the ED   CBC WITH PLATELETS DIFFERENTIAL - Abnormal; Notable for the following:        Result Value    RDW 16.6 (*)     Nucleated RBCs 1 (*)     All other components within normal limits   COMPREHENSIVE METABOLIC PANEL - Abnormal; Notable for the following:     Chloride 110 (*)     Glucose 176 (*)     Creatinine 1.58 (*)     GFR Estimate 44 (*)     GFR Estimate If Black 53 (*)     Albumin 3.3 (*)     All other components within normal limits   INR - Abnormal; Notable for the following:     INR 2.29 (*)     All other components within normal limits   PARTIAL THROMBOPLASTIN TIME - Abnormal; Notable for the following:     PTT 46 (*)     All other components within normal limits "   PERIPHERAL IV CATHETER       Assessments & Plan (with Medical Decision Making)  67 year old with complicated history including LVAD.  He presents with epigastric pain.  Seems consistent with gastritis or reflux.  EKG here is unchanged.  Chest x-ray is clear.  Labs are at baseline.  INR is therapeutic.  Patient received a GI cocktail with improvement of his symptoms.  Patient will continue his home medications.  Interestingly sinus ischemia.  Chest x-ray clear.  No signs of pneumonia.  Patient feeling better will discharge home.  History was obtained via professional .  He will f/u with pcp this week.         I have reviewed the nursing notes.    I have reviewed the findings, diagnosis, plan and need for follow up with the patient.    New Prescriptions    No medications on file       Final diagnoses:   Acute gastritis without hemorrhage, unspecified gastritis type       2/21/2017   [facility], [place], EMERGENCY DEPARTMENT     Abilio Pruitt MD  02/21/17 0591

## 2017-02-21 NOTE — DISCHARGE INSTRUCTIONS
Please make an appointment to follow up with Your Primary Care Provider in 3-5 days   Gastritis (Adult)    Gastritis is inflammation and irritation of the stomach lining. It can be present for a short time (acute) or be long lasting (chronic). Gastritis is often caused by infection with bacteria called H pylori. More than a third of people in the [place] have this bacteria in their bodies. In many cases, H pylori causes no problems or symptoms. In some people, though, the infection irritates the stomach lining and causes gastritis. Other causes of stomach irritation include drinking alcohol or taking pain-relieving medicines called NSAIDs (such as aspirin or ibuprofen).   Symptoms of gastritis can include:    Abdominal pain or bloating    Loss of appetite    Nausea or vomiting    Vomiting blood or having black stools    Feeling more tired than usual  An inflamed and irritated stomach lining is more likely to develop a sore called an ulcer. To help prevent this, gastritis should be treated.  Home care  If needed, medicines may be prescribed. If you have H pylori infection, treating it will likely relieve your symptoms. Other changes can help reduce stomach irritation and help it heal.    If you have been prescribed medicines for H pylori infection, take them as directed. Take all of the medicine until it is finished or your healthcare provider tells you to stop, even if you feel better.    Your healthcare provider may recommend avoiding NSAIDs. If you take daily aspirin for your heart or other medical reasons, do not stop without talking to your healthcare provider first.    Avoid drinking alcohol.    Stop smoking. Smoking can irritate the stomach and delay healing. As much as possible, stay away from second hand smoke.  Follow-up care  Follow up with your healthcare provider, or as advised by our staff. Testing may be needed to check for inflammation or an ulcer.  When to seek medical advice  Call your healthcare  provider for any of the following:    Stomach pain that gets worse or moves to the lower right abdomen (appendix area)    Chest pain that appears or gets worse, or spreads to the back, neck, shoulder, or arm    Frequent vomiting (can t keep down liquids)    Blood in the stool or vomit (red or black in color)    Feeling weak or dizzy    Fever of 100.4 F (38 C) or higher, or as directed by your healthcare provider    [id number] [employer]. [address] [place] [address]. All rights reserved. This information is not intended as a substitute for professional medical care. Always follow your healthcare professional's instructions.

## 2017-02-21 NOTE — ED AVS SNAPSHOT
[facility], Emergency Department    [address] [facility] [address]    Phone:  [phone]                                       Sohan Rendon   MRN: [id number]    Department:  [facility], Emergency Department   Date of Visit:  2/21/2017           Patient Information     Date Of Birth          1950        Your diagnoses for this visit were:     Acute gastritis without hemorrhage, unspecified gastritis type        You were seen by Abilio Pruitt MD.        Discharge Instructions         Please make an appointment to follow up with Your Primary Care Provider in 3-5 days   Gastritis (Adult)    Gastritis is inflammation and irritation of the stomach lining. It can be present for a short time (acute) or be long lasting (chronic). Gastritis is often caused by infection with bacteria called H pylori. More than a third of people in the [place] have this bacteria in their bodies. In many cases, H pylori causes no problems or symptoms. In some people, though, the infection irritates the stomach lining and causes gastritis. Other causes of stomach irritation include drinking alcohol or taking pain-relieving medicines called NSAIDs (such as aspirin or ibuprofen).   Symptoms of gastritis can include:    Abdominal pain or bloating    Loss of appetite    Nausea or vomiting    Vomiting blood or having black stools    Feeling more tired than usual  An inflamed and irritated stomach lining is more likely to develop a sore called an ulcer. To help prevent this, gastritis should be treated.  Home care  If needed, medicines may be prescribed. If you have H pylori infection, treating it will likely relieve your symptoms. Other changes can help reduce stomach irritation and help it heal.    If you have been prescribed medicines for H pylori infection, take them as directed. Take all of the medicine until it is finished or your healthcare provider tells you to stop, even if you feel better.    Your healthcare provider may  recommend avoiding NSAIDs. If you take daily aspirin for your heart or other medical reasons, do not stop without talking to your healthcare provider first.    Avoid drinking alcohol.    Stop smoking. Smoking can irritate the stomach and delay healing. As much as possible, stay away from second hand smoke.  Follow-up care  Follow up with your healthcare provider, or as advised by our staff. Testing may be needed to check for inflammation or an ulcer.  When to seek medical advice  Call your healthcare provider for any of the following:    Stomach pain that gets worse or moves to the lower right abdomen (appendix area)    Chest pain that appears or gets worse, or spreads to the back, neck, shoulder, or arm    Frequent vomiting (can t keep down liquids)    Blood in the stool or vomit (red or black in color)    Feeling weak or dizzy    Fever of 100.4 F (38 C) or higher, or as directed by your healthcare provider    7069-8297 [employer]. [address] [place] [address]. All rights reserved. This information is not intended as a substitute for professional medical care. Always follow your healthcare professional's instructions.          Future Appointments        Provider Department Dept Phone Center    2/23/2017 9:45 AM Thomas Dill MD; Jerald Potts [facility] Care [facility] [phone] [facility]    2/27/2017 1:00 PM Cris Barnhart MD [facility] General Surgery [phone] [facility]    3/14/2017 2:00 PM Thomas Dill MD [facility] Care [facility] [phone] [facility]    5/11/2017 10:00 AM [facility] IMAGING CVC ECHO ROOM 2 [facility] Echo [phone] [facility]    5/11/2017 11:00 AM Lab [facility] Lab [phone] [facility]    5/11/2017 11:30 AM UC CV DEVICE 1              se [facility] [phone] [facility]    5/11/2017 12:00 PM Maureen Grant NP [facility] [phone] [facility]      24 Hour Appointment Hotline       To make an appointment at any [facility], call  [phone] ([phone]). If you don't have a family doctor or clinic, we will help you find one. [facility] are conveniently located to serve the needs of you and your family.             Review of your medicines      Our records show that you are taking the medicines listed below. If these are incorrect, please call your family doctor or clinic.        Dose / Directions Last dose taken    acetaminophen 325 MG tablet   Commonly known as:  TYLENOL   Dose:  650 mg   Quantity:  100 tablet        Take 2 tablets (650 mg) by mouth every 6 hours   Refills:  0        allopurinol 100 MG tablet   Commonly known as:  ZYLOPRIM   Dose:  100 mg   Quantity:  90 tablet        Take 1 tablet (100 mg) by mouth daily   Refills:  3        ascorbic acid 500 MG tablet   Commonly known as:  VITAMIN C   Dose:  500 mg   Quantity:  90 tablet        Take 1 tablet (500 mg) by mouth daily With iron pill   Refills:  3        atorvastatin 10 MG tablet   Commonly known as:  LIPITOR   Dose:  10 mg   Quantity:  30 tablet        Take 1 tablet (10 mg) by mouth daily   Refills:  5        azelastine 0.05 % Soln ophthalmic solution   Commonly known as:  OPTIVAR   Dose:  1 drop   Quantity:  1 Bottle        Apply 1 drop to eye 2 times daily   Refills:  11        BD SHARPS CONTAINER HOME Misc   Dose:  1 each   Quantity:  1 each        1 each as needed   Refills:  1        bisacodyl 5 MG EC tablet   Commonly known as:  DULCOLAX   Dose:  5 mg   Quantity:  20 tablet        Take 1 tablet (5 mg) by mouth daily as needed for constipation   Refills:  1        blood glucose lancets standard   Commonly known as:  no brand specified   Quantity:  1 Box        Use to test blood sugar 2 times daily or as directed.   Refills:  11        blood glucose monitoring lancets   Quantity:  1 Box        Use to test blood sugars 2 times daily or as directed.   Refills:  3        blood glucose monitoring test strip   Commonly known as:  no brand specified   Quantity:   1 Box        Use to test blood sugar 2 times daily or as directed.   Refills:  3        calcium carbonate-vitamin D 500-400 MG-UNIT Tabs per tablet   Dose:  1 tablet   Quantity:  180 tablet        Take 1 tablet by mouth 2 times daily   Refills:  3        carboxymethylcellulose 0.5 % Soln ophthalmic solution   Commonly known as:  REFRESH PLUS   Dose:  1 drop   Quantity:  30 mL        Place 1 drop into the right eye 3 times daily as needed for other (eye irritation or discomfort.)   Refills:  3        ferrous sulfate 325 (65 FE) MG tablet   Commonly known as:  IRON   Dose:  325 mg   Quantity:  90 tablet        Take 1 tablet (325 mg) by mouth daily (with breakfast)   Refills:  3        finasteride 5 MG tablet   Commonly known as:  PROSCAR   Dose:  5 mg   Quantity:  30 tablet        Take 1 tablet (5 mg) by mouth daily   Refills:  0        guaiFENesin-dextromethorphan 100-10 MG/5ML syrup   Commonly known as:  ROBITUSSIN DM   Dose:  5 mL   Quantity:  560 mL        Take 5 mLs by mouth every 4 hours as needed for cough   Refills:  3        levETIRAcetam 750 MG tablet   Commonly known as:  KEPPRA   Dose:  750 mg   Quantity:  180 tablet        Take 1 tablet (750 mg) by mouth 2 times daily   Refills:  3        loratadine 10 MG tablet   Commonly known as:  CLARITIN   Dose:  10 mg   Quantity:  90 tablet        Take 1 tablet (10 mg) by mouth daily   Refills:  3        losartan 25 MG tablet   Commonly known as:  COZAAR   Dose:  25 mg   Quantity:  45 tablet        Take 1 tablet (25 mg) by mouth daily   Refills:  3        melatonin 1 MG Tabs tablet   Dose:  1 mg   Quantity:  30 tablet        Take 1 tablet (1 mg) by mouth At Bedtime   Refills:  0        * metoprolol 25 MG 24 hr tablet   Commonly known as:  TOPROL-XL   Dose:  25 mg   Quantity:  90 tablet        Take 1 tablet (25 mg) by mouth every evening   Refills:  3        * metoprolol 50 MG 24 hr tablet   Commonly known as:  TOPROL-XL   Dose:  50 mg   Quantity:  90 tablet         Take 1 tablet (50 mg) by mouth daily   Refills:  3        metroNIDAZOLE 500 MG tablet   Commonly known as:  FLAGYL   Dose:  500 mg   Indication:  Infection Within the Abdomen, appendicitis   Quantity:  18 tablet        Take 1 tablet (500 mg) by mouth every 8 hours for 6 days   Refills:  0        nitroglycerin 0.4 MG sublingual tablet   Commonly known as:  NITROSTAT   Dose:  0.4 mg   Quantity:  30 tablet        Place 1 tablet (0.4 mg) under the tongue every 5 minutes as needed for chest pain   Refills:  1        nystatin 449053 UNIT/GM Powd   Commonly known as:  MYCOSTATIN   Quantity:  60 g        Apply to affected areas of skin in the groin and on the scrotum three times a day as needed.   Refills:  3        olopatadine 0.1 % ophthalmic solution   Commonly known as:  PATANOL   Dose:  1 drop   Quantity:  1 Bottle        Place 1 drop into both eyes 2 times daily   Refills:  11        * order for DME   Quantity:  1 Device        Equipment being ordered: Lift chair.  Please see indications and face-to-face encounter details in 2/3/15 Office Visit note.   Refills:  0        * order for DME   Quantity:  2 each        Equipment being ordered: Bilateral leg supports for manual wheelchair.   Refills:  0        * order for DME   Quantity:  1 each        Equipment being ordered: Reclining manual w/c with bilateral elevating leg rests.   Refills:  0        * order for DME   Quantity:  1 each        Equipment being ordered: Hospital Bed, fully electric.   Refills:  0        * order for DME   Quantity:  1 each        Equipment being ordered: Wheelchair, manual.   Refills:  0        * order for DME   Quantity:  1 each        Equipment being ordered: Wheelchair, manual, with elevated leg rests and tilt in space back.  Please fax to [facility]; I called them 11/26/16 and they requested the new order.  Face to face is in my 10/26/16 note.   Refills:  0        oxyCODONE 5 MG IR tablet   Commonly known as:  ROXICODONE   Dose:  5 mg    Quantity:  90 tablet        Take 1 tablet (5 mg) by mouth every 4 hours as needed for moderate to severe pain   Refills:  0        pantoprazole 40 MG EC tablet   Commonly known as:  PROTONIX   Dose:  40 mg   Quantity:  180 tablet        Take 1 tablet (40 mg) by mouth daily   Refills:  3        senna-docusate 8.6-50 MG per tablet   Commonly known as:  SENOKOT-S;PERICOLACE   Dose:  1-2 tablet   Quantity:  60 tablet        Take 1-2 tablets by mouth 2 times daily as needed for constipation   Refills:  3        simethicone 80 MG chewable tablet   Commonly known as:  MYLICON   Dose:  80 mg   Quantity:  180 tablet        Take 1 tablet (80 mg) by mouth 4 times daily   Refills:  5        tamsulosin 0.4 MG capsule   Commonly known as:  FLOMAX   Dose:  0.4 mg   Quantity:  90 capsule        Take 1 capsule (0.4 mg) by mouth daily   Refills:  3        Vitamin B-1 100 MG Tabs   Dose:  100 mg   Quantity:  90 tablet        Take 1 tablet (100 mg) by mouth daily   Refills:  3        warfarin 3 MG tablet   Commonly known as:  COUMADIN   Quantity:  180 tablet        Take 3mg by mouth on Mondays and Thursdays. Take 4.5mg by mouth all other days of the week.   Refills:  3        * Notice:  This list has 8 medication(s) that are the same as other medications prescribed for you. Read the directions carefully, and ask your doctor or other care provider to review them with you.      ASK your doctor about these medications        Dose / Directions Last dose taken    levofloxacin 750 MG tablet   Commonly known as:  LEVAQUIN   Dose:  750 mg   Indication:  Infection Within the Abdomen, For appenditicitis   Quantity:  3 tablet   Ask about: Should I take this medication?        Take 1 tablet (750 mg) by mouth every 48 hours for 6 days   Refills:  0                Procedures and tests performed during your visit     CBC with platelets differential    Comprehensive metabolic panel    EKG 12-lead, tracing only    INR    Partial thromboplastin time  "   Peripheral IV catheter    XR Chest 2 Views      Orders Needing Specimen Collection     None      Pending Results     Date and Time Order Name Status Description    2017 0338 XR Chest 2 Views Preliminary             Pending Culture Results     No orders found from 2017 to 2017.            Thank you for choosing [facility]       Thank you for choosing [facility] for your care. Our goal is always to provide you with excellent care. Hearing back from our patients is one way we can continue to improve our services. Please take a few minutes to complete the written survey that you may receive in the mail after you visit with us. Thank you!        [employer] Information     [employer] lets you send messages to your doctor, view your test results, renew your prescriptions, schedule appointments and more. To sign up, go to [email or web address] . Click on \"Log in\" on the left side of the screen, which will take you to the Welcome page. Then click on \"Sign up Now\" on the right side of the page.     You will be asked to enter the access code listed below, as well as some personal information. Please follow the directions to create your username and password.     Your access code is: [email or web address]  Expires: 2017  6:30 AM     Your access code will  in 90 days. If you need help or a new code, please call your [facility] clinic or [phone].        Care EveryWhere ID     This is your Care EveryWhere ID. This could be used by other organizations to access your [facility] medical records  [id number]        After Visit Summary       This is your record. Keep this with you and show to your community pharmacist(s) and doctor(s) at your next visit.                  "

## 2017-02-22 LAB — INTERPRETATION ECG - MUSE: NORMAL

## 2017-02-23 ENCOUNTER — TELEPHONE (OUTPATIENT)
Dept: FAMILY MEDICINE | Facility: CLINIC | Age: 67
End: 2017-02-23

## 2017-02-23 ENCOUNTER — OFFICE VISIT (OUTPATIENT)
Dept: FAMILY MEDICINE | Facility: CLINIC | Age: 67
End: 2017-02-23
Payer: MEDICARE

## 2017-02-23 VITALS
RESPIRATION RATE: 12 BRPM | DIASTOLIC BLOOD PRESSURE: 60 MMHG | HEART RATE: 58 BPM | OXYGEN SATURATION: 99 % | SYSTOLIC BLOOD PRESSURE: 96 MMHG

## 2017-02-23 DIAGNOSIS — R56.9 CONVULSIONS, UNSPECIFIED CONVULSION TYPE (H): ICD-10-CM

## 2017-02-23 DIAGNOSIS — G47.01 INSOMNIA DUE TO MEDICAL CONDITION: ICD-10-CM

## 2017-02-23 DIAGNOSIS — I63.411 CEREBRAL INFARCTION DUE TO EMBOLISM OF RIGHT MIDDLE CEREBRAL ARTERY (H): ICD-10-CM

## 2017-02-23 DIAGNOSIS — K21.00 GASTROESOPHAGEAL REFLUX DISEASE WITH ESOPHAGITIS: ICD-10-CM

## 2017-02-23 DIAGNOSIS — Z95.811 LVAD (LEFT VENTRICULAR ASSIST DEVICE) PRESENT (H): ICD-10-CM

## 2017-02-23 DIAGNOSIS — I50.22 CHF (CONGESTIVE HEART FAILURE), NYHA CLASS IV, CHRONIC, SYSTOLIC (H): ICD-10-CM

## 2017-02-23 DIAGNOSIS — R13.12 OROPHARYNGEAL DYSPHAGIA: ICD-10-CM

## 2017-02-23 DIAGNOSIS — S72.001K: ICD-10-CM

## 2017-02-23 DIAGNOSIS — J30.1 NON-SEASONAL ALLERGIC RHINITIS DUE TO POLLEN: ICD-10-CM

## 2017-02-23 DIAGNOSIS — R05.3 CHRONIC COUGH: ICD-10-CM

## 2017-02-23 DIAGNOSIS — K35.80 ACUTE APPENDICITIS WITHOUT PERITONITIS: Primary | ICD-10-CM

## 2017-02-23 PROBLEM — K37 APPENDICITIS: Status: RESOLVED | Noted: 2017-02-11 | Resolved: 2017-02-23

## 2017-02-23 PROCEDURE — 99350 HOME/RES VST EST HIGH MDM 60: CPT

## 2017-02-23 NOTE — MR AVS SNAPSHOT
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  Please try the magnesium hydroxide for your breakthrough indigestion symptoms.  You can take 30 mL (it will come with a measuring cup) every 2 hours as you need to.  Please remember that using a lot of this may cause diarrhea.    2.  We made no other medication changes today.    3.  I ordered a pair of hell-protector padded boots, which you can put on for periods of time to take the pressure off the heels, which may prevent skin ulcers.    4.  I'l be back to see you on Tuesday, March 14th at 2 PM.        Follow-ups after your visit        Your next 10 appointments already scheduled     Feb 27, 2017 12:45 PM CST   New Patient Visit with Cris Barnhart MD, Stefany TAO [facility] ([facility])    [address] [place] [id number]   [phone]            Mar 07, 2017  3:15 PM CST   Office Visit with Joann Muse [facility] ([facility])     [address] [place] [address]  [address]  [facility] [id number] [phone]           Bring a current list of meds and any records pertaining to this visit.  For Physicals, please bring immunization records and any forms needing to be filled out.  Please arrive 10 minutes early to complete paperwork.            Mar 14, 2017  2:00 PM CDT   Return Visit with Thomas Dlil MD   [facility]    [address] [place] [address]  [address] [place] [phone]            May 11, 2017 10:00 AM CDT   Ech Complete Lvad* with UCECHCR2   [facility] ([facility])    [address] [place] [id number] [phone]           1.  Please bring or wear a comfortable two-piece outfit. 2.  You may eat, drink and take your normal medicines. 3.  For any questions that cannot be answered, please contact the ordering physician            May 11, 2017 11:00 AM CDT   Lab with  LAB   [facility] ([facility])    [address] [place] [id number] [phone]            May 11, 2017 11:30 AM CDT   (Arrive by 11:15 AM)   Implanted Defibulator with  Cv Device 1   [facility] ([facility])    [address] [place] [id number] [phone]            May 11, 2017 12:00 PM CDT   (Arrive by 11:45 AM)   Ventricular Assist Device with Maureen Grant NP   [facility] ([facility])    [address] [place] [id number] [phone]              Who to contact     If you have questions or need follow up information about today's clinic visit or your schedule please contact [facility] directly at [phone].  Normal or non-critical lab and imaging results will be communicated to you by MyChart, letter or phone within 4 business days after the clinic has  "received the results. If you do not hear from us within 7 days, please contact the clinic through [facility] or phone. If you have a critical or abnormal lab result, we will notify you by phone as soon as possible.  Submit refill requests through [facility] or call your pharmacy and they will forward the refill request to us. Please allow 3 business days for your refill to be completed.          Additional Information About Your Visit        Caregivers Information     [facility]givers lets you send messages to your doctor, view your test results, renew your prescriptions, schedule appointments and more. To sign up, go to [email or web address] . Click on \"Log in\" on the left side of the screen, which will take you to the Welcome page. Then click on \"Sign up Now\" on the right side of the page.     You will be asked to enter the access code listed below, as well as some personal information. Please follow the directions to create your username and password.     Your access code is: [email or web address]  Expires: 2017  6:30 AM     Your access code will  in 90 days. If you need help or a new code, please call your [facility] clinic or [phone].        Care EveryWhere ID     This is your Care EveryWhere ID. This could be used by other organizations to access your [facility] medical records  [id number]        Your Vitals Were     Pulse Respirations Pulse Oximetry             58 12 99%          Blood Pressure from Last 3 Encounters:   17 96/60   17 (!) 126/94   02/15/17 111/82    Weight from Last 3 Encounters:   17 182 lb (82.6 kg)   17 98 lb 8 oz (44.7 kg)   17 182 lb (82.6 kg)              Today, you had the following     No orders found for display         Today's Medication Changes          These changes are accurate as of: 17 11:59 PM.  If you have any questions, ask your nurse or doctor.               Start taking these medicines.        Dose/Directions    magnesium hydroxide 400 MG/5ML " suspension   Commonly known as:  MILK OF MAGNESIA   Used for:  Gastroesophageal reflux disease with esophagitis   Started by:  Thomas Dill MD        Dose:  30 mL   Take 30 mLs by mouth daily as needed for heartburn   Quantity:  473 mL   Refills:  1         These medicines have changed or have updated prescriptions.        Dose/Directions    acetaminophen 325 MG tablet   Commonly known as:  TYLENOL   This may have changed:    - when to take this  - reasons to take this   Used for:  Femoral neck fracture, right, closed, initial encounter        Dose:  650 mg   Take 2 tablets (650 mg) by mouth every 6 hours   Quantity:  100 tablet   Refills:  0       azelastine 0.05 % Soln ophthalmic solution   Commonly known as:  OPTIVAR   This may have changed:    - when to take this  - reasons to take this        Dose:  1 drop   Apply 1 drop to eye 2 times daily   Quantity:  1 Bottle   Refills:  11       calcium carbonate-vitamin D 500-400 MG-UNIT Tabs per tablet   This may have changed:  when to take this   Used for:  Fracture of femoral neck, right, closed, initial encounter        Dose:  1 tablet   Take 1 tablet by mouth 2 times daily   Quantity:  180 tablet   Refills:  3       * order for DME   This may have changed:  Another medication with the same name was added. Make sure you understand how and when to take each.   Used for:  Fracture of femoral neck, right, closed, initial encounter, Stroke (H), CHF (congestive heart failure), NYHA class IV (H)   Changed by:  Thomas Dill MD        Equipment being ordered: Lift chair.  Please see indications and face-to-face encounter details in 2/3/15 Office Visit note.   Quantity:  1 Device   Refills:  0       * order for DME   This may have changed:  Another medication with the same name was added. Make sure you understand how and when to take each.   Used for:  Cerebrovascular accident (CVA) due to embolism of right middle cerebral artery (H), Closed fracture of neck of  right femur with nonunion, subsequent encounter   Changed by:  Thomas Dill MD        Equipment being ordered: Bilateral leg supports for manual wheelchair.   Quantity:  2 each   Refills:  0       * order for DME   This may have changed:  Another medication with the same name was added. Make sure you understand how and when to take each.   Used for:  Subcortical infarction (H), Closed fracture of neck of right femur with nonunion, subsequent encounter   Changed by:  Thomas Dill MD        Equipment being ordered: Reclining manual w/c with bilateral elevating leg rests.   Quantity:  1 each   Refills:  0       * order for DME   This may have changed:  Another medication with the same name was added. Make sure you understand how and when to take each.   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   Quantity:  1 each   Refills:  0       * order for DME   This may have changed:  Another medication with the same name was added. Make sure you understand how and when to take each.   Used for:  Cerebrovascular accident (CVA) due to embolism of right middle cerebral artery (H), Closed fracture of neck of right femur with nonunion, subsequent encounter   Changed by:  Thomas Dill MD        Equipment being ordered: Wheelchair, manual.   Quantity:  1 each   Refills:  0       * order for DME   This may have changed:  Another medication with the same name was added. Make sure you understand how and when to take each.   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  Please fax to [facility]; I called them 11/26/16 and they requested the new order.  Face to face is in my 10/26/16 note.   Quantity:  1 each   Refills:  0       * order for DME   This may have changed:  You were already taking a medication with the same name, and this prescription was added. Make sure you understand how and when to take each.   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   Quantity:  2 each   Refills:  0       * Notice:  This list has 7 medication(s) that are the same as other medications prescribed for you. Read the directions carefully, and ask your doctor or other care provider to review them with you.         Where to get your medicines      These medications were sent to [facility]/pharmacy #[id number] - [place], [place] - [address], [place] [address]     Phone:  [phone]     magnesium hydroxide 400 MG/5ML suspension         Some of these will need a paper prescription and others can be bought over the counter.  Ask your nurse if you have questions.     Bring a paper prescription for each of these medications     order for DME                Primary Care Provider Office Phone # Fax #    Thomas Dill [phone]        [facility] [address] [place], [place] [address] [address]        Thank you!     Thank you for choosing [facility]  for your care. Our goal is always to provide you with excellent care. Hearing back from our patients is one way we can continue to improve our services. Please take a few minutes to complete the written survey that you may receive in the mail after your visit with us. Thank you!             Your Updated Medication List - Protect others around you: Learn how to  safely use, store and throw away your medicines at www.disposemymeds.org.          This list is accurate as of: 2/23/17 11:59 PM.  Always use your most recent med list.                   Brand Name Dispense Instructions for use    acetaminophen 325 MG tablet    TYLENOL    100 tablet    Take 2 tablets (650 mg) by mouth every 6 hours       allopurinol 100 MG tablet    ZYLOPRIM    90 tablet    Take 1 tablet (100 mg) by mouth daily       ascorbic acid 500 MG tablet    VITAMIN C    90 tablet    Take 1 tablet (500 mg) by mouth daily With iron pill       atorvastatin 10 MG tablet    LIPITOR    30 tablet    Take 1 tablet (10 mg) by mouth daily       azelastine 0.05 % Soln ophthalmic solution    OPTIVAR    1 Bottle    Apply 1 drop to eye 2 times daily       BD SHARPS CONTAINER HOME Misc     1 each    1 each as needed       bisacodyl 5 MG EC tablet    DULCOLAX    20 tablet    Take 1 tablet (5 mg) by mouth daily as needed for constipation       blood glucose lancets standard    no brand specified    1 Box    Use to test blood sugar 2 times daily or as directed.       blood glucose monitoring lancets     1 Box    Use to test blood sugars 2 times daily or as directed.       blood glucose monitoring test strip    no brand specified    1 Box    Use to test blood sugar 2 times daily or as directed.       calcium carbonate-vitamin D 500-400 MG-UNIT Tabs per tablet     180 tablet    Take 1 tablet by mouth 2 times daily       carboxymethylcellulose 0.5 % Soln ophthalmic solution    REFRESH PLUS    30 mL    Place 1 drop into the right eye 3 times daily as needed for other (eye irritation or discomfort.)       ferrous sulfate 325 (65 FE) MG tablet    IRON    90 tablet    Take 1 tablet (325 mg) by mouth daily (with breakfast)       finasteride 5 MG tablet    PROSCAR    30 tablet    Take 1 tablet (5 mg) by mouth daily       guaiFENesin-dextromethorphan 100-10 MG/5ML syrup    ROBITUSSIN DM    560 mL    Take 5 mLs by mouth every 4 hours as  needed for cough       levETIRAcetam 750 MG tablet    KEPPRA    180 tablet    Take 1 tablet (750 mg) by mouth 2 times daily       loratadine 10 MG tablet    CLARITIN    90 tablet    Take 1 tablet (10 mg) by mouth daily       losartan 25 MG tablet    COZAAR    45 tablet    Take 1 tablet (25 mg) by mouth daily       magnesium hydroxide 400 MG/5ML suspension    MILK OF MAGNESIA    473 mL    Take 30 mLs by mouth daily as needed for heartburn       melatonin 1 MG Tabs tablet     30 tablet    Take 1 tablet (1 mg) by mouth At Bedtime       * metoprolol 25 MG 24 hr tablet    TOPROL-XL    90 tablet    Take 1 tablet (25 mg) by mouth every evening       * metoprolol 50 MG 24 hr tablet    TOPROL-XL    90 tablet    Take 1 tablet (50 mg) by mouth daily       nitroglycerin 0.4 MG sublingual tablet    NITROSTAT    30 tablet    Place 1 tablet (0.4 mg) under the tongue every 5 minutes as needed for chest pain       nystatin 577954 UNIT/GM Powd    MYCOSTATIN    60 g    Apply to affected areas of skin in the groin and on the scrotum three times a day as needed.       olopatadine 0.1 % ophthalmic solution    PATANOL    1 Bottle    Place 1 drop into both eyes 2 times daily       * order for DME     1 Device    Equipment being ordered: Lift chair.  Please see indications and face-to-face encounter details in 2/3/15 Office Visit note.       * order for DME     2 each    Equipment being ordered: Bilateral leg supports for manual wheelchair.       * order for DME     1 each    Equipment being ordered: Reclining manual w/c with bilateral elevating leg rests.       * order for DME     1 each    Equipment being ordered: Hospital Bed, fully electric.       * order for DME     1 each    Equipment being ordered: Wheelchair, manual.       * order for DME     1 each    Equipment being ordered: Wheelchair, manual, with elevated leg rests and tilt in space back.  Please fax to [facility]; I called them 11/26/16 and they requested the new order.  Face to  face is in my 10/26/16 note.       * order for DME     2 each    Equipment being ordered: Cushioned heel boots.       oxyCODONE 5 MG IR tablet    ROXICODONE    90 tablet    Take 1 tablet (5 mg) by mouth every 4 hours as needed for moderate to severe pain       pantoprazole 40 MG EC tablet    PROTONIX    180 tablet    Take 1 tablet (40 mg) by mouth daily       senna-docusate 8.6-50 MG per tablet    SENOKOT-S;PERICOLACE    60 tablet    Take 1-2 tablets by mouth 2 times daily as needed for constipation       simethicone 80 MG chewable tablet    MYLICON    180 tablet    Take 1 tablet (80 mg) by mouth 4 times daily       tamsulosin 0.4 MG capsule    FLOMAX    90 capsule    Take 1 capsule (0.4 mg) by mouth daily       Vitamin B-1 100 MG Tabs     90 tablet    Take 1 tablet (100 mg) by mouth daily       warfarin 3 MG tablet    COUMADIN    180 tablet    Take 3mg by mouth on Mondays and Thursdays. Take 4.5mg by mouth all other days of the week.       * Notice:  This list has 9 medication(s) that are the same as other medications prescribed for you. Read the directions carefully, and ask your doctor or other care provider to review them with you.

## 2017-02-23 NOTE — PROGRESS NOTES
SUBJECTIVE:                                                      Sohan Rendon is a 66 year old male who has an LVAD due to severe ischemic cardiomyopathy. He has had recurrent embolic strokes, despite careful anticoagulation, with the LVAD believed to be the source. He was hospitalized 9/23 - 10/4/14 for acute ischemic subcortical stroke, superimposed upon previous R PICA and R MCA strokes. During that hospital stay, several discussions were carried out with the patient's family regarding the possibility of LVAD removal and conversion to Hospice. Per my discussion with his attending cardiologist, Dr. Lemons, family are well aware of the patient's terminal condition, but they steadfastly decline enrolling him into Hospice. They wish to continue LVAD and other cares at home, while ensuring his comfort. Dr. Lemons made a referral for this patient to be seen at home by the [facility] to provide primary care management, though the LVAD/Cardiology clinics will remain in charge of his cardiology care.      Mr. Rendon was seen today in his home along with a daughter and an  for the above issues, in follow-up of a recent hospital stay, as well as for the following health issues:         Heart Failure Follow-up    Patient with end-stage cardiomyopathy, Stage D, [facility] IIIB. LVAD in place. History of recurrent emboli from LVAD despite careful anticoagulation. Patient is not a candidate for device revision or exchange per Cardiology notes.    Symptoms: Reports having occasional vague left anterior chest pain, including today.  The pain overlies the left pectoralis muscle, and he can reproduce and exacerbate the pain by massaging pectoralis.  There is no radiation, diaphoresis, or nausea.  The pain is persistent for days at a time.  He suspects muscular strain.    Shortness of breath: Happens at rest infrequently, but not worse.    Lower extremity edema: No more than trace pedal edema at baseline, currently  "stable.    Chest pain: See above.    Using more pillows than normal: N/A; has adjustable (hospital) bed.    Cough at night: Yes- occasional cough, present for months, addressed separately in this note.    Weight: Checking weight daily: No; patient unable to weight bear.    Cardiology visits, ER/UC, or hospital admissions since last visit: Yes; hospitalized for appendicitis, see below.     Medication side effects: None described to cardiac meds.          Cerebrovascular Follow-up    History of multiple embolic strokes, LVAD source, despite warfarin anticoagulation. Most recent documented stroke September 2014.    Residual symptoms: Dysarthria, dysphagia, left arm and leg weakness. Dysarthria has improved remarkably. Dense left hemiparesis is unimproved. Dysphagia has improved to permit PO feeding of an unrestricted texture diet.  Diagnosed with new-onset seizure disorder 6/2015; see below.    Worsened or new symptoms since last visit: None.    Daily aspirin use: Stopped during 9/2016 hospital stay for gross hematuria and remains on hold.  At Urology recommendation, ASA is on hold until hematuria confirmed resolved.  Warfarin has continued, though inpatient Urology notes suggest a more conservative INR target of 1.8 to 2.3 instead of the prior Cardiology target of 2.5 to 3.0 (indication: history of recurrent stroke associated with LVAD).  I sent messages to Urology and Cardiology to see if these restrictions still apply, and heard back from Dr. Lemons, Cardiology.  Dr. Lemons advises that we should continue to hold ASA, and continue to aim for INR 1.8 to 2.3, until further notice.         Seizures    Duration: Initially diagnosed during hospitalization of 6/2015. Was noted to have seizure in-house, levetiracetam started.    Description (location/character/radiation): Described as \"focal onset epilepsy\" in Neuro consultation.    Intensity: No witnessed seizure activity since return home from 6/2015 " "admission.    History (similar episodes/previous evaluation): Neuro consultation during hospital stay 6/2015. No neuro follow-up scheduled.    Precipitating or alleviating factors: History of multiple embolic CVA due to LVAD thrombosis, most recently 9/2014.    Therapies tried and outcome: Levetiracetam 500 mg BID started 6/2015, no witnessed seizure activity since Rx started.                                                  Cough        Duration: At least one year, though frequency has varied over time.    Description (location/character/radiation): Episodic cough, occurs both day and night, sometimes interferes with sleep.  Productive of small to scant amounts of sputum that is either clear or pale yellow.  No hemoptysis.  No associated dyspnea, respiratory chest pain, or wheeze.  I have never noted any abnormal findings on lung exam even when cough present.    Intensity:  Much improved since switch from lisinopril to losartan 10/2016. \"It's OK\" per patient.     Accompanying signs and symptoms: As described above.  Patient also has chronic nasal congestion, occasional clear discharge, as well as sensation of post-nasal gtts.    History (similar episodes/previous evaluation): Patient denies previous ENT evaluation.  Daughters requested ENT referral, which I made, but they never scheduled an appointment.  He has no underlying history of pulmonary disease.  Multiple CT head, most recently 5/23/16, have shown no sinus disease.    Precipitating or alleviating factors: Unclear.  Differential includes chronic rhinitis, due to subtle dysphagia, and ACE-I cough.    Therapies tried and outcome: Have tried multiple antihistamines, most recently loratadine and cetirizine, which haven't helped.  Fluticasone had to be stopped due to epistaxis.  Patient requested ENT referral rather than observation or additional empiric therapy.  I made that referral, and patient's daughters have been contacted, but no appointment has been " made. At our visit late 10/2016, with Dr. Lemons's approval, I stopped lisinopril and started losartan. With that change, patient reports cough frequency and severity to have improved, and is no longer bothersome.                                     Amount of exercise or physical activity: None; essentially bed bound. Family says he can stand for brief periods of time if supported, cannot walk. There is a Jay lift at home used for transfer to chair or wheelchair. Family requested replacement manual wheelchair that will permit positioning of lower extremities given right leg fracture which I ordered 3/2016, and which has finally arrived.  Current hospital bed controls have failed, new Rx for hospital bed sent to [facility] on 8/19/16, no word yet on delivery per daughter.    Problems taking medications regularly: Has some mild dysphagia, but is not missing doses. Meds administered by daughters.    Medication side effects: Lethargic occasionally following oxycodone doses, though has been using very little recently.    Diet: regular (no restrictions). Daughters feed him. No witnessed choking or aspiration.        Hospital Follow-up Visit:    Hospital: [facility]  Date of Admission: 2/11/17  Date of Discharge: 2/15/17  Reason(s) for Admission: 1. Acute appendicitis.            Problems taking medications regularly:  None       Medication changes since discharge: Antibiotics are complete.       Problems adhering to non-medication therapy:  None    Summary of hospitalization:  [facility] discharge summary reviewed, portions follow:    Date of Admission: 2/11/2017  Date of Discharge: 2/15/2017 4:26 PM  Discharging Provider: Jess Moore        Discharge Diagnoses      Active Problems:  Appendicitis     PCP Follow-up:   Follow-up with Primary Care Physician in 1 week for repeat CBC, BMP and assessment of abdominal pain/toleration of PO intake. Will also need to assess timing of  restarting home lasix 20mg daily.         History of Present Illness      Sohan Rendon is a 67 year old man with chronic systolic heart failure 2/2 to ischemic CM s/p HM II LVAD placement in [place] now Destination Therapy due to multiple CVA's with residual left sided weakness c/b recurrent hemolysis and pump thrombus, CKD, who was admitted for 24 hr history of RLQ abdominal pain found to have CT evidence of non-perforated appendicitis.         Hospital Course      #Appendicitis: Presented with RLQ pain, tenderness at McBurney's point. Afebrile, hemodynamically stable. Normal WBC. CT scan with non-perforated appendicitis. General Surgery was consulted with plan for trial of conservative management. Pt was initiated on Ertapenem IV (renally dosed) with clinical improvement and later transitioned to PO antibiotics levofloxacin and metronidazole with plan to complete 10 day course total. At time of discharge pt had very minimal tenderness to palpation on RLQ, tolerated regular diet. Throughout admission, pt never spiked a fever or developed leukocytosis.   --Follow-up with Primary Care Physician in 1 week for repeat CBC, BMP and assessment of abdominal pain/toleration of PO intake   --Follow-up in General Surgery Clinic in 4-6 weeks with Dr. Meyer     #Chronic systolic heart failure secondary to ischemic CM:  Stage D, NYHA Class III B. S/p HM II LVAD placement in [place] now DT due to multiple CVAs with residual left sided weakness complicated by recurrent hemolysis and pump thrombus. Focus of care remains on quality of life.    --Not on ACEi/ARB because he did not tolerate it in past   --LVAD settings: Flow 4.1 lpm, PI 5.7, Speed 8800 rpm, Power 5.2 capps  --Atorvastin 10mg PO daily  --Holding home Lasix 20mg PO daily until followup with PCP   --Metoprolol 50mg daily, 25mg qhs   --Losartan 25mg daily   --SCD prophylaxis: ICD  --% BiV pacing:   N/A  --Fluid status: euvolemic. Avoid dehydration  --INR goal:  2.5-3 due to CVAs and pump thrombus.  Coumadin clinic to adjust.  --Antiplatelet agents:   --NSAID use: No      #ICD interrogation: Last interrogated on 1/11 with intrinsic rhythm is 65 bpm. Normal ICD function. No arrhythmias recorded. OptiVol thoracic impedance slightly above baseline.   85%.      #HTN: currently controlled, no issues with hypotension.   *Home regimen: Lasix 20mg once daily, Losartan 25mg PO tablet, Metoprolol as above   --Continue Losartan   --PCP to re-initiate lasix after discharge      #Multiple CVAs with residual and recurrent hemolysis/thrombus:  Current baseline with left hemiparesis. No significant rehab potential.  Continue coumadin and ASA.  Not candidate for pump exchange.  Continue to focus on symptom management.         #History of seizure: Continue Levetiracetam 750mg PO BID       #Cough: Evaluated by ED on 2/7, felt most likely viral. Pt did not like diphenhydramine, initiated Robitussen for symptom control.   --Robitussen PRN (will also schedule doses to ensure that pt is offered this med, but ok to decline)       #Gout: Allopurinol 100mg PO daily       #Constipation: Bisocodyl 5mg PO daily prn       #BPH: Finasteride 5mg PO daily, flomax .4mg daily       #Iron Deficiency Anemia: Ferrous sulfate 325mg PO daily with breakfast and Vitamin C  #Insomnia: Meltaonin 1mg qhs        Significant Results and Procedures      2/11 CT Abd/Pelvis  Impression:   1. Findings concerning for nonperforated appendicitis, including  dilation of the appendix and periappendiceal inflammatory change.  2. Nonobstructing nephrolithiasis in both kidneys. No ureteral or  bladder stone.     2/11 CXR      Impression:   1. No acute cardiopulmonary abnormality.  2. Implantable cardiac defibrillator and LVAD are unchanged.    Diagnostic Tests/Treatments reviewed.  Follow up needed: None.  Other Healthcare Providers Involved in Patient s Care:         Surgical follow-up appointment - scheduled.  Update  "since discharge: improved. Patient has zero abdominal pain.  Able to eat general diet without pain, nausea, vomiting, diarrhea.    Post Discharge Medication Reconciliation: discharge medications reconciled and changed, per note/orders (see AVS).  Plan of care communicated with patient and his daughter.     Coding guidelines for this visit:  Type of Medical   Decision Making Face-to-Face Visit       within 7 Days of discharge Face-to-Face Visit        within 14 days of discharge   Moderate Complexity [id number]   High Complexity [id number]            Problem list and histories reviewed & adjusted, as indicated.  Additional history: as documented    BP Readings from Last 3 Encounters:   02/23/17 96/60   02/21/17 (!) 126/94   02/15/17 111/82    Wt Readings from Last 3 Encounters:   02/21/17 182 lb (82.6 kg)   02/11/17 98 lb 8 oz (44.7 kg)   02/07/17 182 lb (82.6 kg)                  Labs reviewed in EPIC  Problem list, Medication list, Allergies, and Medical/Social/Surgical histories reviewed in [facility] and updated as appropriate.    ROS:  Obtained both from patient's daughter, as well as the patient himself via .   CONSTITUTIONAL: NEGATIVE for chills, fever, sweats.   EYES: NEGATIVE for eye irritation or discharge.  ENT/MOUTH: NEGATIVE for nasal congestion, sinus pressure, or recurrence of epistaxis.  RESP: NEGATIVE for dyspnea, wheeze, or respiratory chest pain. See above regarding cough.  CV: NEGATIVE for orthopnea or worsened lower extremity edema.  See above re chest pain.  GI: NEGATIVE for abdominal pain, diarrhea, nausea or vomiting. No melena or hematochezia noted.  Sometimes bothered by \"indigestion\" in the evenings despite pantoprazole 40 mg QD.  : NEGATIVE for hematuria, dysuria, or flank pain.   MUSCULOSKELETAL: NEGATIVE for change or worsening in right hip pain.  Currently using only rare oxycodone.    INTEG: Previous intertriginous rash in groin has improved.  No rashes.  NEURO: NEGATIVE for " new or worsened focal numbness or weakness or syncope.  Occipital headache has been mild.  PSYCHIATRIC: NEGATIVE for changes in mild baseline anxiety, no panic, no recent change in mood.    OBJECTIVE:                                                    BP 96/60  Pulse 58  Resp 12  SpO2 99% on room air.  There is no height or weight on file to calculate BMI.    GENERAL: Appears clean and comfortable, lying in bed.   EYES: Eyes grossly normal to inspection, no conjunctivitis or discharge.  HENT: Nares patent by sniff, no discharge.    NECK: Trachea midline, no stridor.    RESP: No accessory muscle use. Coughed once during this visit.  Symmetrical breath sounds. Lungs clear anteriorly on inspiration and expiration. Expiration not prolonged, no wheeze.  CV: Regular rate and rhythm, non-tachycardic. Prominent LVAD noise, which sounds like someone is running a vacuum  in the near background, makes exam difficult. S1 S2 softly audible, no murmur or extra sound. No lower extremity edema. Extremities cool x4.  ABDOMEN: LVAD entry site clean and dry, no discharge or erythema.  Soft, non-tender, no guarding over all quadrants. Liver and spleen not palpable, no masses palpable. Bowel sounds positive.  MS: No bony deformities noted, including at fractured right femur. No pain reproduced by passive ROM of either hip or leg. No red or inflamed joints.    SKIN: Warm and dry, no rashes.    NEURO: Alert and conversant, oriented and appropriate in conversation per , though some very mild dysarthria present. Mild left facial droop noted, cranial nerves II - XII otherwise grossly intact.  Dense left upper and lower extremity paresis persists.  PSYCH: Calm, conversant. Language barrier prevents good exam, though affect appears normal.    Diagnostic Test Results:  Results for orders placed or performed during the hospital encounter of 02/21/17   XR Chest 2 Views    Narrative    EXAM: XR CHEST 2 VW  2/21/2017 4:53 AM       HISTORY: chest pain    COMPARISON: 2/11/2017    FINDINGS: AP radiograph of the chest. LVAD remains stable. Left chest  wall implantable cardiac defibrillator generator and leads are intact.  Postoperative chest with median sternotomy wires and mediastinal  surgical clips.    Stable cardiomegaly. Pulmonary vasculature is within normal limits. No  focal airspace opacities. No pneumothorax.      Impression    IMPRESSION:   1. No focal airspace opacities.  2. LVAD positioning remain stable.         I have personally reviewed the examination and initial interpretation  and I agree with the findings.    ISRAEL PEOPLES MD   CBC with platelets differential   Result Value Ref Range    WBC 7.7 4.0 - 11.0 10e9/L    RBC Count 4.44 4.4 - 5.9 10e12/L    Hemoglobin 14.0 13.3 - 17.7 g/dL    Hematocrit 41.9 40.0 - 53.0 %    MCV 94 78 - 100 fl    MCH 31.5 26.5 - 33.0 pg    MCHC 33.4 31.5 - 36.5 g/dL    RDW 16.6 (H) 10.0 - 15.0 %    Platelet Count 175 150 - 450 10e9/L    Diff Method Automated Method     % Neutrophils 69.9 %    % Lymphocytes 14.2 %    % Monocytes 9.5 %    % Eosinophils 4.8 %    % Basophils 0.4 %    % Immature Granulocytes 1.2 %    Nucleated RBCs 1 (H) 0 /100    Absolute Neutrophil 5.4 1.6 - 8.3 10e9/L    Absolute Lymphocytes 1.1 0.8 - 5.3 10e9/L    Absolute Monocytes 0.7 0.0 - 1.3 10e9/L    Absolute Eosinophils 0.4 0.0 - 0.7 10e9/L    Absolute Basophils 0.0 0.0 - 0.2 10e9/L    Abs Immature Granulocytes 0.1 0 - 0.4 10e9/L    Absolute Nucleated RBC 0.1    Comprehensive metabolic panel   Result Value Ref Range    Sodium 139 133 - 144 mmol/L    Potassium 4.0 3.4 - 5.3 mmol/L    Chloride 110 (H) 94 - 109 mmol/L    Carbon Dioxide 20 20 - 32 mmol/L    Anion Gap 10 3 - 14 mmol/L    Glucose 176 (H) 70 - 99 mg/dL    Urea Nitrogen 26 7 - 30 mg/dL    Creatinine 1.58 (H) 0.66 - 1.25 mg/dL    GFR Estimate 44 (L) >60 mL/min/1.7m2    GFR Estimate If Black 53 (L) >60 mL/min/1.7m2    Calcium 8.6 8.5 - 10.1 mg/dL    Bilirubin Total  0.5 0.2 - 1.3 mg/dL    Albumin 3.3 (L) 3.4 - 5.0 g/dL    Protein Total 7.2 6.8 - 8.8 g/dL    Alkaline Phosphatase 84 40 - 150 U/L    ALT 16 0 - 70 U/L    AST 22 0 - 45 U/L   INR   Result Value Ref Range    INR 2.29 (H) 0.86 - 1.14   Partial thromboplastin time   Result Value Ref Range    PTT 46 (H) 22 - 37 sec   EKG 12-lead, tracing only   Result Value Ref Range    Interpretation ECG Click View Image link to view waveform and result      *Note: Due to a large number of results and/or encounters for the requested time period, some results have not been displayed. A complete set of results can be found in Results Review.        ASSESSMENT/PLAN:                                                      (K35.80) Acute appendicitis without peritonitis  (primary encounter diagnosis)  Comment: Was the cause of a 5 day hospital stay starting 2/11/17.  Surgery was not performed prior due to risk from comorbidity.  Patient has now completed antibiotic therapy.  He has no residual pain, has resumed PO diet, exam today is normal (aside from LVAD), and CBC and metabolic profile are at baseline values a few days ago.  Plan: Process appears resolved.  I demonstrated to daughter where in the RLQ she can monitor for pain going forward.  Will watch for recurrence.    (R05) Chronic cough    Comment: Present for at least a year, though subjectively bothersome recently.  He has no history of lung disease, I have never noted abnormal findings on lung exam, and previous chest imaging has been normal.  Cough is associated with prominent nasal symptoms.  CT imaging as recently as 5/2016 showed no sinus abnormalities.  Nasal steroid therapy had to be stopped due to epistaxis.  Empiric therapy with a couple different antihistamines was ineffective.  Patient requested ENT referral rather than continued empiric Rx, which I made, though it does not appear that his daughters scheduled that visit. At 10/2016 visit, with Dr. Lemons's approval, we  "stopped lisinopril and started losartan. Cough has improved with that change, is no longer bothersome (\"it's OK\", per patient).  Plan: Will continue losartan, observe for cough recurrence.     (J30.9) Allergic rhinitis, unspecified allergic rhinitis type  Comment: Persistent symptoms.  Plan: Had to stop fluticasone (FLONASE) 50 MCG/ACT nasal spray due to epistaxis.  Continue loratadine, observe.     (I50.22) CHF (congestive heart failure), NYHA class IV, (Z95.811) LVAD (left ventricular assist device) present  Comment: Patient has recurrent embolic events from his LVAD, including recurrent stroke, despite careful anticoagulation. His LVAD cannot be exchanged, per my conversation with Dr. Lemons, Cardiology. Heart failure overall appears to be well-compensated today. VAD interrogated during recent cardiology visit and hospital stay, no abnormalities found.  No medication changes noted since my last visit with him.  Plan: Continue current care, though focusing on palliative care. He continues on warfarin as directed by VAD Clinic. Continue to defer management of rhythm, hemodynamics, anticoagulation to the Cardiology/LVAD Clinic.  Previous INR target was 2.5 to 3.0 per Cardiology, but a lower target INR of 1.8 to 2.3 is currently being used due to recurrent gross hematuria.  ASA was on hold at Cardiology advice due to hematuria.  These INR and ASA modifications are to continue until further notice, per message from Cardiology.       (G47.01) Insomnia due to medical condition    Comment: Episodic sleep-initiation difficulties, not severe.  He is using acetaminophen/diphenhydramine and melatonin OTC with some relief.  Plan: Continue \"Tylenol PM\" (acetaminophen/diphenhydramine) and melatonin 3 mg at HS PRN.     (R56.9) Focal onset epilepsy  Comment: Seizure at home 6/2015 prompted hospitalization, patient now on levetiracetam. No additional seizure activity noted since Rx started.   Plan: Continue levetiracetam 500 mg " Q12H, observe for seizure recurrence.      (S72.001K) Fracture of femoral neck, right, closed, subsequent encounter  Comment: S/P hospitalization 1/2015. Family declined surgery, pain control now the primary goal. Patient seen most recently in Sports Med clinic on 4/7/15 by Dr. Taylor. No specific therapy recommended, and no orthopedics follow-up necessary.  Seen in ED 2/2016 due to recurrent hip pain after PT, imaging showed no change, symptoms resolved without change in Rx.  Plan: Patient and daughter report generally good pain control on oxycodone IR 5 mg Q6H PRN, which he is now using rarely. PT was helpful at improving patient's tolerance of PROM of hip, and reducing pain and muscle cramping, but is completed now.  Could resume PT if needed for pain, immobility in future.     (I63.411) Cerebrovascular accident 9/2014 due to embolism of R MCA  Comment: Residual deficits include dysarthria, which has improved remarkably, dysphagia (see below), dense left hemiparesis, and new-onset epilepsy as of 6/2015. Given that source of embolic strokes is from LVAD, and that embolism cannot be prevented despite anticoagulation, future events are possible or likely. He had what appears to have been a TIA during 4/2016 hospital stay when warfarin and ASA were held due to gross hematuria, but subsequent neuro workup did not reveal new stroke, and daughters report stable neuro status since return home.  Plan: Warfarin anticoagulation continues per Cardiology, ASA held due to hematuria, details above.  Observe for new events.     (R13.10) Dysphagia  Comment: Had bedside swallow evaluation from Speech Therapy 12/2014. He had timbo aspiration only to thin liquids. Per daughters, although they have to feed him due to weakness from stroke, he appears to swallow without choking or aspiration episodes.  Plan: Continue speech therapy recommendations: patient should be sitting straight up when eating, take small bolus sizes, eat slowly,  not talk during eating. Continue PO diet.    (K21.0) Gastroesophageal reflux disease with esophagitis  Comment: Symptoms generally well-controlled, and anemia resolved, on pantoprazole 40 mg QD.  Today e reports occasional breakthrough reflux symptoms in the evening despite pantoprazole.  Plan: Advised use of magnesium hydroxide (MILK OF MAGNESIA) 400 MG/5ML suspension 30 cc PRN, and advised patient and daughter of the side effect of diarrhea.  They'll give it a try.    FUTURE APPOINTMENTS:       - Follow-up visit scheduled for 3/14/17 at 1400.    Face to face time was 45 minutes, at least 50% of which was spent in counseling regarding management of recent appendicitis, ongoing GERD symptoms, as well as stroke residual and cardiac symptoms.      Thomas Dill MD  [facility]

## 2017-02-23 NOTE — PATIENT INSTRUCTIONS
1.  Please try the magnesium hydroxide for your breakthrough indigestion symptoms.  You can take 30 mL (it will come with a measuring cup) every 2 hours as you need to.  Please remember that using a lot of this may cause diarrhea.    2.  We made no other medication changes today.    3.  I ordered a pair of hell-protector padded boots, which you can put on for periods of time to take the pressure off the heels, which may prevent skin ulcers.    4.  I'l be back to see you on Tuesday, March 14th at 2 PM.

## 2017-02-24 ENCOUNTER — TELEPHONE (OUTPATIENT)
Dept: SURGERY | Facility: CLINIC | Age: 67
End: 2017-02-24

## 2017-02-27 ENCOUNTER — ANTICOAGULATION THERAPY VISIT (OUTPATIENT)
Dept: ANTICOAGULATION | Facility: CLINIC | Age: 67
End: 2017-02-27

## 2017-02-27 DIAGNOSIS — Z95.811 LVAD (LEFT VENTRICULAR ASSIST DEVICE) PRESENT (H): ICD-10-CM

## 2017-02-27 DIAGNOSIS — Z79.01 LONG-TERM (CURRENT) USE OF ANTICOAGULANTS: ICD-10-CM

## 2017-02-27 LAB — INR PPP: 2.5

## 2017-02-27 NOTE — PROGRESS NOTES
ANTICOAGULATION FOLLOW-UP CLINIC VISIT    Patient Name:  Sohan Rendon  Date:  2/27/2017  Contact Type:  Telephone    SUBJECTIVE:        OBJECTIVE    INR   Date Value Ref Range Status   02/27/2017 2.5  Final     Chromogenic Factor 10   Date Value Ref Range Status   08/10/2014 24 (L) 70 - 130 % Final     Comment:     Therapeutic Range:  A Chromogenic Factor 10 level of approximately 20-40%   inversely correlates with an INR of 2-3 for patients receiving Warfarin.   Chromogenic Factor 10 levels below 20% indicate an INR greater than 3 and   levels above 40% indicate an INR less than 2.       Factor 2 Assay   Date Value Ref Range Status   07/21/2014 25 (L) 60 - 140 % Final     Comment:     Analyte Specific Reagents (ASRs) are used in many laboratory tests necessary   for   standard medical care and generally do not require FDA approval.  This test   was   developed and its performance characteristics determined by [facility].  It has not been cleared or approved by   the US Food and Drug Administration.         ASSESSMENT / PLAN  No question data found.  Anticoagulation Summary as of 2/27/2017     INR goal    Prior goal 1.8-2.5   Today's INR 2.5   Maintenance plan 4.5 mg (3 mg x 1.5) on Sun, Tue, Thu; 3 mg (3 mg x 1) all other days   Full instructions 4.5 mg on Sun, Tue, Thu; 3 mg all other days   Weekly total 25.5 mg   Plan last modified Blanca Bah RN (2/27/2017)   Next INR check 3/6/2017   Priority INR   Target end date Indefinite    Indications   LVAD (left ventricular assist device) present [Z95.811]  Long-term (current) use of anticoagulants [Z79.01] [Z79.01]         Anticoagulation Episode Summary     INR check location     Preferred lab     Send INR reminders to [facility]    Comments Goal Range is 1.8-2.3  [facility] comes out to draw INR  Daughter Almaz Ph [phone]      Anticoagulation Care Providers     Provider Role Specialty Phone  chip Lemons, Evon Aranda MD [facility] Card[facility] [phone]            See the Encounter Report to view Anticoagulation Flowsheet and Dosing Calendar (Go to Encounters tab in chart review, and find the Anticoagulation Therapy Visit)    Spoke with Sofya MCKINNON.    Blanca Bah RN

## 2017-03-06 ENCOUNTER — ANTICOAGULATION THERAPY VISIT (OUTPATIENT)
Dept: ANTICOAGULATION | Facility: CLINIC | Age: 67
End: 2017-03-06

## 2017-03-06 DIAGNOSIS — Z79.01 LONG-TERM (CURRENT) USE OF ANTICOAGULANTS: ICD-10-CM

## 2017-03-06 DIAGNOSIS — Z95.811 LVAD (LEFT VENTRICULAR ASSIST DEVICE) PRESENT (H): ICD-10-CM

## 2017-03-06 LAB — INR PPP: 2.4

## 2017-03-06 NOTE — MR AVS SNAPSHOT
Sohan Rendon   3/6/2017   Anticoagulation Therapy Visit    Description:  67 year old male   Provider:  Sarah Gill, RN   Department:  [facility]           INR as of 3/6/2017     Today's INR 2.4      Anticoagulation Summary as of 3/6/2017     INR goal    Prior goal 1.8-2.5   Today's INR 2.4   Full instructions 4.5 mg on Sun, Tue, Thu; 3 mg all other days   Next INR check 3/13/2017    Indications   LVAD (left ventricular assist device) present [Z95.811]  Long-term (current) use of anticoagulants [Z79.01] [Z79.01]         March 2017 Details    Sun Mon Tue Wed Thu Fri Sat        1               2               3               4                 5               6      3 mg   See details      7      4.5 mg         8      3 mg         9      4.5 mg         10      3 mg         11      3 mg           12      4.5 mg         13            14               15               16               17               18                 19               20               21               22               23               24               25                 26               27               28               29               30               31                 Date Details   03/06 This INR check       Date of next INR:  3/13/2017         How to take your warfarin dose     To take:  3 mg Take 1 of the 3 mg tablets.    To take:  4.5 mg Take 1.5 of the 3 mg tablets.

## 2017-03-06 NOTE — PROGRESS NOTES
ANTICOAGULATION FOLLOW-UP CLINIC VISIT    Patient Name:  Sohan Rendon  Date:  3/6/2017  Contact Type:  Telephone    SUBJECTIVE:     Patient Findings     Positives No Problem Findings           OBJECTIVE    INR   Date Value Ref Range Status   03/06/2017 2.4  Final     Chromogenic Factor 10   Date Value Ref Range Status   08/10/2014 24 (L) 70 - 130 % Final     Comment:     Therapeutic Range:  A Chromogenic Factor 10 level of approximately 20-40%   inversely correlates with an INR of 2-3 for patients receiving Warfarin.   Chromogenic Factor 10 levels below 20% indicate an INR greater than 3 and   levels above 40% indicate an INR less than 2.       Factor 2 Assay   Date Value Ref Range Status   07/21/2014 25 (L) 60 - 140 % Final     Comment:     Analyte Specific Reagents (ASRs) are used in many laboratory tests necessary   for   standard medical care and generally do not require FDA approval.  This test   was   developed and its performance characteristics determined by [facility].  It has not been cleared or approved by   the US Food and Drug Administration.         ASSESSMENT / PLAN  INR assessment THER    Recheck INR In: 1 WEEK    INR Location [facility]      Anticoagulation Summary as of 3/6/2017     INR goal    Prior goal 1.8-2.5   Today's INR 2.4   Maintenance plan 4.5 mg (3 mg x 1.5) on Sun, Tue, Thu; 3 mg (3 mg x 1) all other days   Full instructions 4.5 mg on Sun, Tue, Thu; 3 mg all other days   Weekly total 25.5 mg   No change documented Sarah Gill, SHAWN   Plan last modified Blanca Bah RN (2/27/2017)   Next INR check 3/13/2017   Priority INR   Target end date Indefinite    Indications   LVAD (left ventricular assist device) present [Z95.811]  Long-term (current) use of anticoagulants [Z79.01] [Z79.01]         Anticoagulation Episode Summary     INR check location     Preferred lab     Send INR reminders to [facility]    Comments Goal  Range is 1.8-2.3  [facility] comes out to draw INR  Daughter Almaz Ph [phone]      Anticoagulation Care Providers     Provider Role Specialty Phone number    Evon Lemons MD Responsible Cardiology [phone]            See the Encounter Report to view Anticoagulation Flowsheet and Dosing Calendar (Go to Encounters tab in chart review, and find the Anticoagulation Therapy Visit)    Spoke with INO Gill RN

## 2017-03-10 ENCOUNTER — CARE COORDINATION (OUTPATIENT)
Dept: GERIATRIC MEDICINE | Facility: CLINIC | Age: 67
End: 2017-03-10

## 2017-03-10 NOTE — PROGRESS NOTES
CM has been in contact with client's CADI CM Madison from [facility] ([phone] ext [phone]) regarding transferring client to a [facility] CADI CM. Client now lives in [facility] and she no will longer be his CM. CM has been trying to find out from [facility] who the CADI CM will be. CM first called Disability [employer]age [employer] and was told to call [facility] Economic Assistance. They transferred CM to [facility]. The person AMANDA spoke with stated there were notes from the  that client did not need an assessment because he was now with [facility]. CM stated the [facility] does not manage CADI waivers and there needs to be a CADI CM assigned to his case. Client requires too many services to remain living in [facility]. AMANDA was given the number of the [facility] assessor Zari Beasley ([phone]) and her supervisor Lily Blackburn ([phone]) and left them both messages 3/9/17 regarding this client. AMANDA did not hear back from them yet. Message left today with Anjelica, managed care advocate at [facility] ([phone]) to see if she is able to assist this case.   Nena Salazar RN  [facility]   [phone]

## 2017-03-13 ENCOUNTER — ANTICOAGULATION THERAPY VISIT (OUTPATIENT)
Dept: ANTICOAGULATION | Facility: CLINIC | Age: 67
End: 2017-03-13

## 2017-03-13 DIAGNOSIS — Z79.01 LONG-TERM (CURRENT) USE OF ANTICOAGULANTS: ICD-10-CM

## 2017-03-13 DIAGNOSIS — Z95.811 LVAD (LEFT VENTRICULAR ASSIST DEVICE) PRESENT (H): ICD-10-CM

## 2017-03-13 LAB — INR PPP: 2.4

## 2017-03-13 NOTE — MR AVS SNAPSHOT
[facility]   3/13/2017   Anticoagulation Therapy Visit    Description:  67 year old male   Provider:  Dafne Sanders RN   Department:  [facility]           INR as of 3/13/2017     Today's INR 2.4      Anticoagulation Summary as of 3/13/2017     INR goal    Prior goal 1.8-2.5   Today's INR 2.4   Full instructions 4.5 mg on Sun, Tue, Thu; 3 mg all other days   Next INR check 3/27/2017    Indications   LVAD (left ventricular assist device) present [Z95.811]  Long-term (current) use of anticoagulants [Z79.01] [Z79.01]         March 2017 Details    Sun Mon Tue Wed Thu Fri Sat        1               2               3               4                 5               6               7               8               9               10               11                 12               13      3 mg   See details      14      4.5 mg         15      3 mg         16      4.5 mg         17      3 mg         18      3 mg           19      4.5 mg         20      3 mg         21      4.5 mg         22      3 mg         23      4.5 mg         24      3 mg         25      3 mg           26      4.5 mg         27            28               29               30               31                 Date Details   03/13 This INR check       Date of next INR:  3/27/2017         How to take your warfarin dose     To take:  3 mg Take 1 of the 3 mg tablets.    To take:  4.5 mg Take 1.5 of the 3 mg tablets.

## 2017-03-13 NOTE — PROGRESS NOTES
ANTICOAGULATION FOLLOW-UP CLINIC VISIT    Patient Name:  Sohan Rendon  Date:  3/13/2017  Contact Type:  Telephone    SUBJECTIVE:     Patient Findings     Positives No Problem Findings           OBJECTIVE    INR   Date Value Ref Range Status   03/13/2017 2.4  Final     Chromogenic Factor 10   Date Value Ref Range Status   08/10/2014 24 (L) 70 - 130 % Final     Comment:     Therapeutic Range:  A Chromogenic Factor 10 level of approximately 20-40%   inversely correlates with an INR of 2-3 for patients receiving Warfarin.   Chromogenic Factor 10 levels below 20% indicate an INR greater than 3 and   levels above 40% indicate an INR less than 2.       Factor 2 Assay   Date Value Ref Range Status   07/21/2014 25 (L) 60 - 140 % Final     Comment:     Analyte Specific Reagents (ASRs) are used in many laboratory tests necessary   for   standard medical care and generally do not require FDA approval.  This test   was   developed and its performance characteristics determined by [facility].  It has not been cleared or approved by   the US Food and Drug Administration.         ASSESSMENT / PLAN  INR assessment THER    Recheck INR In: 2 WEEKS    [facility]R Location [facility]      Anticoagulation Summary as of 3/13/2017     INR goal    Prior goal 1.8-2.5   Today's INR 2.4   Maintenance plan 4.5 mg (3 mg x 1.5) on Sun, Tue, Thu; 3 mg (3 mg x 1) all other days   Full instructions 4.5 mg on Sun, Tue, Thu; 3 mg all other days   Weekly total 25.5 mg   Plan last modified Blanca Bah RN (2/27/2017)   Next INR check 3/27/2017   Priority INR   Target end date Indefinite    Indications   LVAD (left ventricular assist device) present [Z95.811]  Long-term (current) use of anticoagulants [Z79.01] [Z79.01]         Anticoagulation Episode Summary     INR check location     Preferred lab     Send INR reminders to [facility]    Comments Goal Range is 1.8-2.3  [facility] comes out to  draw INR  Daughter Almaz Ph [phone]      Anticoagulation Care Providers     Provider Role Specialty Phone number    Evon Lemons MD Responsible Cardiology [phone]            See the Encounter Report to view Anticoagulation Flowsheet and Dosing Calendar (Go to Encounters tab in chart review, and find the Anticoagulation Therapy Visit)    Spoke with Sofya [facility] .    Dafne Sanders RN

## 2017-03-15 ENCOUNTER — ANTICOAGULATION THERAPY VISIT (OUTPATIENT)
Dept: ANTICOAGULATION | Facility: CLINIC | Age: 67
End: 2017-03-15

## 2017-03-15 ENCOUNTER — HOSPITAL ENCOUNTER (EMERGENCY)
Facility: CLINIC | Age: 67
Discharge: HOME | End: 2017-03-15
Attending: EMERGENCY MEDICINE | Admitting: EMERGENCY MEDICINE
Payer: MEDICARE

## 2017-03-15 VITALS
DIASTOLIC BLOOD PRESSURE: 85 MMHG | SYSTOLIC BLOOD PRESSURE: 102 MMHG | HEIGHT: 68 IN | BODY MASS INDEX: 27.28 KG/M2 | WEIGHT: 180 LBS | OXYGEN SATURATION: 96 % | TEMPERATURE: 97.8 F | RESPIRATION RATE: 18 BRPM | HEART RATE: 72 BPM

## 2017-03-15 DIAGNOSIS — R42 LIGHTHEADEDNESS: ICD-10-CM

## 2017-03-15 DIAGNOSIS — R04.0 EPISTAXIS: ICD-10-CM

## 2017-03-15 DIAGNOSIS — Z95.811 LVAD (LEFT VENTRICULAR ASSIST DEVICE) PRESENT (H): ICD-10-CM

## 2017-03-15 DIAGNOSIS — Z79.01 LONG-TERM (CURRENT) USE OF ANTICOAGULANTS: ICD-10-CM

## 2017-03-15 LAB
ALBUMIN SERPL-MCNC: 3.5 G/DL (ref 3.4–5)
ALP SERPL-CCNC: 110 U/L (ref 40–150)
ALT SERPL W P-5'-P-CCNC: 20 U/L (ref 0–70)
ANION GAP SERPL CALCULATED.3IONS-SCNC: 9 MMOL/L (ref 3–14)
AST SERPL W P-5'-P-CCNC: ABNORMAL U/L (ref 0–45)
BASOPHILS # BLD AUTO: 0.1 10E9/L (ref 0–0.2)
BASOPHILS NFR BLD AUTO: 1 %
BILIRUB SERPL-MCNC: 0.9 MG/DL (ref 0.2–1.3)
BUN SERPL-MCNC: 23 MG/DL (ref 7–30)
CALCIUM SERPL-MCNC: 8.8 MG/DL (ref 8.5–10.1)
CHLORIDE SERPL-SCNC: 109 MMOL/L (ref 94–109)
CO2 SERPL-SCNC: 23 MMOL/L (ref 20–32)
CREAT SERPL-MCNC: 1.82 MG/DL (ref 0.66–1.25)
DIFFERENTIAL METHOD BLD: ABNORMAL
EOSINOPHIL # BLD AUTO: 0.5 10E9/L (ref 0–0.7)
EOSINOPHIL NFR BLD AUTO: 8.6 %
ERYTHROCYTE [DISTWIDTH] IN BLOOD BY AUTOMATED COUNT: 17.1 % (ref 10–15)
GFR SERPL CREATININE-BSD FRML MDRD: 37 ML/MIN/1.7M2
GLUCOSE SERPL-MCNC: 134 MG/DL (ref 70–99)
HCT VFR BLD AUTO: 43.2 % (ref 40–53)
HGB BLD-MCNC: 14.4 G/DL (ref 13.3–17.7)
IMM GRANULOCYTES # BLD: 0.1 10E9/L (ref 0–0.4)
IMM GRANULOCYTES NFR BLD: 0.8 %
INR PPP: 2.07 (ref 0.86–1.14)
LYMPHOCYTES # BLD AUTO: 1.1 10E9/L (ref 0.8–5.3)
LYMPHOCYTES NFR BLD AUTO: 17.4 %
MCH RBC QN AUTO: 31.9 PG (ref 26.5–33)
MCHC RBC AUTO-ENTMCNC: 33.3 G/DL (ref 31.5–36.5)
MCV RBC AUTO: 96 FL (ref 78–100)
MONOCYTES # BLD AUTO: 0.8 10E9/L (ref 0–1.3)
MONOCYTES NFR BLD AUTO: 13 %
NEUTROPHILS # BLD AUTO: 3.6 10E9/L (ref 1.6–8.3)
NEUTROPHILS NFR BLD AUTO: 59.2 %
NRBC # BLD AUTO: 0 10*3/UL
NRBC BLD AUTO-RTO: 0 /100
PLATELET # BLD AUTO: 136 10E9/L (ref 150–450)
POTASSIUM SERPL-SCNC: 4 MMOL/L (ref 3.4–5.3)
PROT SERPL-MCNC: 7.8 G/DL (ref 6.8–8.8)
RBC # BLD AUTO: 4.52 10E12/L (ref 4.4–5.9)
SODIUM SERPL-SCNC: 141 MMOL/L (ref 133–144)
WBC # BLD AUTO: 6.1 10E9/L (ref 4–11)

## 2017-03-15 PROCEDURE — 99283 EMERGENCY DEPT VISIT LOW MDM: CPT | Performed by: EMERGENCY MEDICINE

## 2017-03-15 PROCEDURE — 99283 EMERGENCY DEPT VISIT LOW MDM: CPT | Mod: Z6 | Performed by: EMERGENCY MEDICINE

## 2017-03-15 PROCEDURE — 36415 COLL VENOUS BLD VENIPUNCTURE: CPT | Performed by: EMERGENCY MEDICINE

## 2017-03-15 PROCEDURE — 85025 COMPLETE CBC W/AUTO DIFF WBC: CPT | Performed by: EMERGENCY MEDICINE

## 2017-03-15 PROCEDURE — 85610 PROTHROMBIN TIME: CPT | Performed by: EMERGENCY MEDICINE

## 2017-03-15 PROCEDURE — 80053 COMPREHEN METABOLIC PANEL: CPT | Performed by: EMERGENCY MEDICINE

## 2017-03-15 ASSESSMENT — ENCOUNTER SYMPTOMS
DIARRHEA: 0
COUGH: 0
LIGHT-HEADEDNESS: 1
NAUSEA: 0
ABDOMINAL PAIN: 0
SHORTNESS OF BREATH: 0
FEVER: 0
HEADACHES: 1
VOMITING: 0

## 2017-03-15 NOTE — PROGRESS NOTES
ANTICOAGULATION FOLLOW-UP CLINIC VISIT    Patient Name:  Sohan Rendon  Date:  3/15/2017  Contact Type:  Telephone    SUBJECTIVE:     Patient Findings     Comments Spoke with Almaz and she reports that Sohan was in the ER for a nosebleed. It was attributed to dryness.           OBJECTIVE    INR   Date Value Ref Range Status   03/15/2017 2.07 (H) 0.86 - 1.14 Final     Chromogenic Factor 10   Date Value Ref Range Status   08/10/2014 24 (L) 70 - 130 % Final     Comment:     Therapeutic Range:  A Chromogenic Factor 10 level of approximately 20-40%   inversely correlates with an INR of 2-3 for patients receiving Warfarin.   Chromogenic Factor 10 levels below 20% indicate an INR greater than 3 and   levels above 40% indicate an INR less than 2.       Factor 2 Assay   Date Value Ref Range Status   07/21/2014 25 (L) 60 - 140 % Final     Comment:     Analyte Specific Reagents (ASRs) are used in many laboratory tests necessary   for   standard medical care and generally do not require FDA approval.  This test   was   developed and its performance characteristics determined by [facility].  It has not been cleared or approved by   the US Food and Drug Administration.         ASSESSMENT / PLAN  INR assessment THER    Recheck INR In: 2 WEEKS    INR Location Clinic      Anticoagulation Summary as of 3/15/2017     INR goal    Prior goal 1.8-2.5   Today's INR 2.07   Maintenance plan 4.5 mg (3 mg x 1.5) on Sun, Tue, Thu; 3 mg (3 mg x 1) all other days   Full instructions 4.5 mg on Sun, Tue, Thu; 3 mg all other days   Weekly total 25.5 mg   No change documented Blanca Bah RN   Plan last modified Blanca Bah RN (2/27/2017)   Next INR check 3/27/2017   Priority INR   Target end date Indefinite    Indications   LVAD (left ventricular assist device) present [Z95.811]  Long-term (current) use of anticoagulants [Z79.01] [Z79.01]         Anticoagulation Episode Summary      INR check location     Preferred lab     Send INR reminders to [facility]    Comments Goal Range is 1.8-2.3  [facility] comes out to draw INR  Daughter Almaz Ph [phone]      Anticoagulation Care Providers     Provider Role Specialty Phone number    Evon Lemons MD Responsible Cardiology [phone]            See the Encounter Report to view Anticoagulation Flowsheet and Dosing Calendar (Go to Encounters tab in chart review, and find the Anticoagulation Therapy Visit)    Spoke with Almaz.    Blanca Bah, RN

## 2017-03-15 NOTE — ED NOTES
"ED TRIAGE    Medical / Trauma C/o:  67-yr male patient - presenting to ED via EMS, from home; started to have L-nare epistaxis at 0700am, today and had dizziness associated with it.  Patient states he had HA-pain, starting at 0500am, today; he states that he has HA-pain, daily; today, it is worse than normal.  Patient HX:  CVA in 2013 and 2014, with left-sided disability; on Coumadin, taken daily.  Daughter is primary caregiver.  Blood continues to ooze from L-nare.  Airway patent.    Duration of C/o:  Since 0500am, today    Contributing Factors / Concerning HX:  See HX    Significant Med's / Tx's:  On Coumadin; see med's    Febrile / Afebrile:  Afebrile    Patient Vitals for the past 24 hrs:   BP Temp Temp src Pulse Heart Rate Resp SpO2 Height Weight   03/15/17 0841 102/85 97.8  F (36.6  C) Oral 68 68 16 99 % 1.727 m (5' 8\") 81.6 kg (180 lb)       Sanjiv Bradley  March 15, 2017  9:09 AM    "

## 2017-03-15 NOTE — DISCHARGE INSTRUCTIONS
Nosebleed (Adult)    Bleeding from the nose most commonly occurs due to injury or drying and cracking of the inner lining of the nose. Most nosebleeds are due to dry air or nose-picking. They can occur during a common cold or an allergy attack. They can also occur on a very hot day, or from dry air in the winter.  If the bleeding site is found, it may be cauterized (treated to cause a blood clot to form). This may be done with a chemical, heat, or electricity. If the bleeding continues after the site is cauterized, or if the site cannot be found, packing may be placed in your nose. This is to apply pressure and stop the bleeding. The packing may be made of gauze or sponge. A small balloon catheter is sometimes used. These must be removed by your doctor. Some types of packing dissolve on their own.  Home care    If packing was put in your nose, unless told otherwise, do not pull on it or try to remove it yourself. You will be given an appointment to have it removed. You may also have been given antibiotics to prevent a sinus infection. If so, finish all of the medicine.    Do not blow your nose for 12 hours after the bleeding stops. This will allow a strong blood clot to form. Do not pick your nose. This may restart bleeding.    Avoid drinking alcohol and hot liquids for the next two days. Alcohol or hot liquids in your mouth can dilate blood vessels in your nose. This can cause bleeding to start again.    Do not take ibuprofen, naproxen, or aspirin-containing medicines. These thin the blood and may promote nose bleeding. You may take acetaminophen for pain, unless another pain medicine was prescribed.    If the bleeding starts again, sit up and lean forward to prevent swallowing blood. Pinch your nose tightly on both sides, as depicted above, for 10 to 15 minutes.  Time yourself, and don t release the pressure on your nose until 10 minutes is up. If bleeding is not controlled, continue to pinch your nose and call  your health care provider or return to this facility.    If you have a cold, allergies, or dry nasal membranes, lubricate the nasal passages. Apply a small amount of petroleum jelly inside the nose with a cotton swab twice a day (morning and night).    Avoid overheating your home, which can dry the air and worsen your condition.    A humidifier in the room where you sleep will add moisture to the air.    Use a saline nasal spray to keep nasal passages moist.    Do not pick your nose. Keep fingernails trimmed to decrease risk of bleeds.  Follow-up care  Follow up with your health care provider, or as advised. Nasal packing should be rechecked or removed within 2 to 3 days.  When to seek medical advice  Call your health care provider right away if any of these occur.    Another nosebleed that you cannot control    Dizziness, weakness or fainting    You become tired or confused    Fever of 100.4 F (38 C) or higher, or as directed by your health care provider    Headache    Sinus or facial pain    Shortness of breath or trouble breathing    9725-8185 [employer]. [address] [place] [address], [place] [address]. All rights reserved. This information is not intended as a substitute for professional medical care. Always follow your healthcare professional's instructions.      Please make an appointment to follow up with Your Primary Care Provider in 2 days if not improving of if you have more questions or concerns.

## 2017-03-15 NOTE — ED NOTES
[facility] - Stetcher set up for patient for ride home; eta:  9670p-7112p.  Charge RN aware.

## 2017-03-15 NOTE — ED AVS SNAPSHOT
[facility], [place], Emergency Department    [address] [address]    Phone:  [phone]                                       Sohan Rendon   MRN: [id number]    Department:  [facility], Emergency Department   Date of Visit:  3/15/2017           After Visit Summary Signature Page     I have received my discharge instructions, and my questions have been answered. I have discussed any challenges I see with this plan with the nurse or doctor.    ..........................................................................................................................................  Patient/Patient Representative Signature      ..........................................................................................................................................  Patient Representative Print Name and Relationship to Patient    ..................................................               ................................................  Date                                            Time    ..........................................................................................................................................  Reviewed by Signature/Title    ...................................................              ..............................................  Date                                                            Time

## 2017-03-15 NOTE — ED AVS SNAPSHOT
[facility], Emergency Department    [address] [address]    Phone:  [phone]                                       Sohan Rendon   MRN: [id number]    Department:  [facility], Emergency Department   Date of Visit:  3/15/2017           Patient Information     Date Of Birth          1950        Your diagnoses for this visit were:     Epistaxis        You were seen by Lily Garcia MD.        Discharge Instructions         Nosebleed (Adult)    Bleeding from the nose most commonly occurs due to injury or drying and cracking of the inner lining of the nose. Most nosebleeds are due to dry air or nose-picking. They can occur during a common cold or an allergy attack. They can also occur on a very hot day, or from dry air in the winter.  If the bleeding site is found, it may be cauterized (treated to cause a blood clot to form). This may be done with a chemical, heat, or electricity. If the bleeding continues after the site is cauterized, or if the site cannot be found, packing may be placed in your nose. This is to apply pressure and stop the bleeding. The packing may be made of gauze or sponge. A small balloon catheter is sometimes used. These must be removed by your doctor. Some types of packing dissolve on their own.  Home care    If packing was put in your nose, unless told otherwise, do not pull on it or try to remove it yourself. You will be given an appointment to have it removed. You may also have been given antibiotics to prevent a sinus infection. If so, finish all of the medicine.    Do not blow your nose for 12 hours after the bleeding stops. This will allow a strong blood clot to form. Do not pick your nose. This may restart bleeding.    Avoid drinking alcohol and hot liquids for the next two days. Alcohol or hot liquids in your mouth can dilate blood vessels in your nose. This can cause bleeding to start again.    Do not take ibuprofen, naproxen, or  aspirin-containing medicines. These thin the blood and may promote nose bleeding. You may take acetaminophen for pain, unless another pain medicine was prescribed.    If the bleeding starts again, sit up and lean forward to prevent swallowing blood. Pinch your nose tightly on both sides, as depicted above, for 10 to 15 minutes.  Time yourself, and don t release the pressure on your nose until 10 minutes is up. If bleeding is not controlled, continue to pinch your nose and call your health care provider or return to this facility.    If you have a cold, allergies, or dry nasal membranes, lubricate the nasal passages. Apply a small amount of petroleum jelly inside the nose with a cotton swab twice a day (morning and night).    Avoid overheating your home, which can dry the air and worsen your condition.    A humidifier in the room where you sleep will add moisture to the air.    Use a saline nasal spray to keep nasal passages moist.    Do not pick your nose. Keep fingernails trimmed to decrease risk of bleeds.  Follow-up care  Follow up with your health care provider, or as advised. Nasal packing should be rechecked or removed within 2 to 3 days.  When to seek medical advice  Call your health care provider right away if any of these occur.    Another nosebleed that you cannot control    Dizziness, weakness or fainting    You become tired or confused    Fever of 100.4 F (38 C) or higher, or as directed by your health care provider    Headache    Sinus or facial pain    Shortness of breath or trouble breathing    0922-5695 [employer]. [address] [place] [address], [place] [address]. All rights reserved. This information is not intended as a substitute for professional medical care. Always follow your healthcare professional's instructions.      Please make an appointment to follow up with Your Primary Care Provider in 2 days if not improving of if you have more questions or concerns.      Future Appointments         Provider Department Dept Phone Center    3/16/2017 12:00 PM Thomas Dill MD; Jerald Potts [facility] [phone] [facility]    3/16/2017 12:15 PM [facility] [phone] [facility]    4/11/2017 2:00 PM Thomas Dill MD [facility] [phone] [facility]    5/9/2017 2:00 PM Thomas Dill MD [facility] [phone] [facility]    5/11/2017 10:00 AM [facility] IMAGING CVC ECHO ROOM 2 [facility] Echo [phone] [facility]    5/11/2017 11:00 AM Lab [facility] Lab [phone] [facility]    5/11/2017 11:30 AM [facility] CV DEVICE 1              se [facility] [phone] [facility]    5/11/2017 12:00 PM Maureen Grant NP [facility] [phone] [facility]    6/13/2017 2:00 PM Thomas Dill MD [facility] Care [facility] [phone] [facility]      24 Hour Appointment Hotline       To make an appointment at any [facility], call [phone] ([phone]). If you don't have a family doctor or clinic, we will help you find one. [facility] are conveniently located to serve the needs of you and your family.             Review of your medicines      Our records show that you are taking the medicines listed below. If these are incorrect, please call your family doctor or clinic.        Dose / Directions Last dose taken    acetaminophen 325 MG tablet   Commonly known as:  TYLENOL   Dose:  650 mg   Quantity:  100 tablet        Take 2 tablets (650 mg) by mouth every 6 hours   Refills:  0        allopurinol 100 MG tablet   Commonly known as:  ZYLOPRIM   Dose:  100 mg   Quantity:  90 tablet        Take 1 tablet (100 mg) by mouth daily   Refills:  3        ascorbic acid 500 MG tablet   Commonly known as:  VITAMIN C   Dose:  500 mg   Quantity:  90 tablet        Take 1 tablet (500 mg) by mouth daily With iron pill   Refills:  3        atorvastatin 10 MG tablet   Commonly known as:  LIPITOR   Dose:  10 mg    Quantity:  30 tablet        Take 1 tablet (10 mg) by mouth daily   Refills:  5        azelastine 0.05 % Soln ophthalmic solution   Commonly known as:  OPTIVAR   Dose:  1 drop   Quantity:  1 Bottle        Apply 1 drop to eye 2 times daily   Refills:  11        BD SHARPS CONTAINER HOME Misc   Dose:  1 each   Quantity:  1 each        1 each as needed   Refills:  1        bisacodyl 5 MG EC tablet   Commonly known as:  DULCOLAX   Dose:  5 mg   Quantity:  20 tablet        Take 1 tablet (5 mg) by mouth daily as needed for constipation   Refills:  1        blood glucose lancets standard   Commonly known as:  no brand specified   Quantity:  1 Box        Use to test blood sugar 2 times daily or as directed.   Refills:  11        blood glucose monitoring lancets   Quantity:  1 Box        Use to test blood sugars 2 times daily or as directed.   Refills:  3        blood glucose monitoring test strip   Commonly known as:  no brand specified   Quantity:  1 Box        Use to test blood sugar 2 times daily or as directed.   Refills:  3        calcium carbonate-vitamin D 500-400 MG-UNIT Tabs per tablet   Dose:  1 tablet   Quantity:  180 tablet        Take 1 tablet by mouth 2 times daily   Refills:  3        carboxymethylcellulose 0.5 % Soln ophthalmic solution   Commonly known as:  REFRESH PLUS   Dose:  1 drop   Quantity:  30 mL        Place 1 drop into the right eye 3 times daily as needed for other (eye irritation or discomfort.)   Refills:  3        ferrous sulfate 325 (65 FE) MG tablet   Commonly known as:  IRON   Dose:  325 mg   Quantity:  90 tablet        Take 1 tablet (325 mg) by mouth daily (with breakfast)   Refills:  3        finasteride 5 MG tablet   Commonly known as:  PROSCAR   Dose:  5 mg   Quantity:  30 tablet        Take 1 tablet (5 mg) by mouth daily   Refills:  0        guaiFENesin-dextromethorphan 100-10 MG/5ML syrup   Commonly known as:  ROBITUSSIN DM   Dose:  5 mL   Quantity:  560 mL        Take 5 mLs by mouth  every 4 hours as needed for cough   Refills:  3        levETIRAcetam 750 MG tablet   Commonly known as:  KEPPRA   Dose:  750 mg   Quantity:  180 tablet        Take 1 tablet (750 mg) by mouth 2 times daily   Refills:  3        loratadine 10 MG tablet   Commonly known as:  CLARITIN   Dose:  10 mg   Quantity:  90 tablet        Take 1 tablet (10 mg) by mouth daily   Refills:  3        losartan 25 MG tablet   Commonly known as:  COZAAR   Dose:  25 mg   Quantity:  45 tablet        Take 1 tablet (25 mg) by mouth daily   Refills:  3        magnesium hydroxide 400 MG/5ML suspension   Commonly known as:  MILK OF MAGNESIA   Dose:  30 mL   Quantity:  473 mL        Take 30 mLs by mouth daily as needed for heartburn   Refills:  1        melatonin 1 MG Tabs tablet   Dose:  1 mg   Quantity:  30 tablet        Take 1 tablet (1 mg) by mouth At Bedtime   Refills:  0        * metoprolol 25 MG 24 hr tablet   Commonly known as:  TOPROL-XL   Dose:  25 mg   Quantity:  90 tablet        Take 1 tablet (25 mg) by mouth every evening   Refills:  3        * metoprolol 50 MG 24 hr tablet   Commonly known as:  TOPROL-XL   Dose:  50 mg   Quantity:  90 tablet        Take 1 tablet (50 mg) by mouth daily   Refills:  3        nitroglycerin 0.4 MG sublingual tablet   Commonly known as:  NITROSTAT   Dose:  0.4 mg   Quantity:  30 tablet        Place 1 tablet (0.4 mg) under the tongue every 5 minutes as needed for chest pain   Refills:  1        nystatin 295397 UNIT/GM Powd   Commonly known as:  MYCOSTATIN   Quantity:  60 g        Apply to affected areas of skin in the groin and on the scrotum three times a day as needed.   Refills:  3        olopatadine 0.1 % ophthalmic solution   Commonly known as:  PATANOL   Dose:  1 drop   Quantity:  1 Bottle        Place 1 drop into both eyes 2 times daily   Refills:  11        * order for DME   Quantity:  1 Device        Equipment being ordered: Lift chair.  Please see indications and face-to-face encounter details in  2/3/15 Office Visit note.   Refills:  0        * order for DME   Quantity:  2 each        Equipment being ordered: Bilateral leg supports for manual wheelchair.   Refills:  0        * order for DME   Quantity:  1 each        Equipment being ordered: Reclining manual w/c with bilateral elevating leg rests.   Refills:  0        * order for DME   Quantity:  1 each        Equipment being ordered: Hospital Bed, fully electric.   Refills:  0        * order for DME   Quantity:  1 each        Equipment being ordered: Wheelchair, manual.   Refills:  0        * order for DME   Quantity:  1 each        Equipment being ordered: Wheelchair, manual, with elevated leg rests and tilt in space back.  Please fax to [employer]; I called them 11/26/16 and they requested the new order.  Face to face is in my 10/26/16 note.   Refills:  0        * order for DME   Quantity:  2 each        Equipment being ordered: Cushioned heel boots.   Refills:  0        oxyCODONE 5 MG IR tablet   Commonly known as:  ROXICODONE   Dose:  5 mg   Quantity:  90 tablet        Take 1 tablet (5 mg) by mouth every 4 hours as needed for moderate to severe pain   Refills:  0        pantoprazole 40 MG EC tablet   Commonly known as:  PROTONIX   Dose:  40 mg   Quantity:  180 tablet        Take 1 tablet (40 mg) by mouth daily   Refills:  3        senna-docusate 8.6-50 MG per tablet   Commonly known as:  SENOKOT-S;PERICOLACE   Dose:  1-2 tablet   Quantity:  60 tablet        Take 1-2 tablets by mouth 2 times daily as needed for constipation   Refills:  3        simethicone 80 MG chewable tablet   Commonly known as:  MYLICON   Dose:  80 mg   Quantity:  180 tablet        Take 1 tablet (80 mg) by mouth 4 times daily   Refills:  5        tamsulosin 0.4 MG capsule   Commonly known as:  FLOMAX   Dose:  0.4 mg   Quantity:  90 capsule        Take 1 capsule (0.4 mg) by mouth daily   Refills:  3        Vitamin B-1 100 MG Tabs   Dose:  100 mg   Quantity:  90 tablet        Take 1  "tablet (100 mg) by mouth daily   Refills:  3        warfarin 3 MG tablet   Commonly known as:  COUMADIN   Quantity:  180 tablet        Take 3mg by mouth on  and . Take 4.5mg by mouth all other days of the week.   Refills:  3        * Notice:  This list has 9 medication(s) that are the same as other medications prescribed for you. Read the directions carefully, and ask your doctor or other care provider to review them with you.            Procedures and tests performed during your visit     CBC with platelets differential    Comprehensive metabolic panel    INR      Orders Needing Specimen Collection     None      Pending Results     No orders found from 3/13/2017 to 3/16/2017.            Pending Culture Results     No orders found from 3/13/2017 to 3/16/2017.            Thank you for choosing [facility]       Thank you for choosing [facility] for your care. Our goal is always to provide you with excellent care. Hearing back from our patients is one way we can continue to improve our services. Please take a few minutes to complete the written survey that you may receive in the mail after you visit with us. Thank you!        [employer] Information     [employer] lets you send messages to your doctor, view your test results, renew your prescriptions, schedule appointments and more. To sign up, go to [email or web address] . Click on \"Log in\" on the left side of the screen, which will take you to the Welcome page. Then click on \"Sign up Now\" on the right side of the page.     You will be asked to enter the access code listed below, as well as some personal information. Please follow the directions to create your username and password.     Your access code is: [email or web address]  Expires: 2017 12:00 PM     Your access code will  in 90 days. If you need help or a new code, please call your [facility] clinic or [phone].        Care EveryWhere ID     This is your Care EveryWhere ID. This could be used by " other organizations to access your [facility] medical records  [id number]        After Visit Summary       This is your record. Keep this with you and show to your community pharmacist(s) and doctor(s) at your next visit.

## 2017-03-15 NOTE — MR AVS SNAPSHOT
Sohan Justice   3/15/2017   Anticoagulation Therapy Visit    Description:  67 year old male   Provider:  Blanca Bah, RN   Department:  [facility]           INR as of 3/15/2017     Today's INR 2.07      Anticoagulation Summary as of 3/15/2017     INR goal    Prior goal 1.8-2.5   Today's INR 2.07   Full instructions 4.5 mg on Sun, Tue, Thu; 3 mg all other days   Next INR check 3/27/2017    Indications   LVAD (left ventricular assist device) present [Z95.811]  Long-term (current) use of anticoagulants [Z79.01] [Z79.01]         March 2017 Details    Sun Mon Tue Wed Thu Fri Sat        1               2               3               4                 5               6               7               8               9               10               11                 12               13               14               15      3 mg   See details      16      4.5 mg         17      3 mg         18      3 mg           19      4.5 mg         20      3 mg         21      4.5 mg         22      3 mg         23      4.5 mg         24      3 mg         25      3 mg           26      4.5 mg         27            28               29               30               31                 Date Details   03/15 This INR check       Date of next INR:  3/27/2017         How to take your warfarin dose     To take:  3 mg Take 1 of the 3 mg tablets.    To take:  4.5 mg Take 1.5 of the 3 mg tablets.

## 2017-03-15 NOTE — ED PROVIDER NOTES
History     Chief Complaint   Patient presents with     Epistaxis     Dizziness     Headache     The history is limited by a language barrier. A  was used (Tuvaluan).     Sohan Rendon is a 67 year old male who presents with nosebleed from the left side of his nose that started about 7:30 this morning. The patient doesn t remember any specific trauma to the nose but the daughter thinks he may have rubbed his nose and that caused the nosebleed. He reports some dizziness that he noted at the same time. There is a language barrier and we are using an in-person  and through the  the patient reports that he was feeling faint and not vertiginous. He will sometimes feel that way with position changes at home and this is not uncommon for him. He reports that that feeling of faintness has resolved now. He also initially reported some discomfort in the back of his head and neck but that has also resolved. He has a history of stroke with left sided arm and leg weakness and he does not walk. He lives in his own home and is cared for by family. They transfer him with a Jay lift. He denies any new numbness  or weakness. No new injury. He has not had fevers. Denies cough, chest pain, shortness of breath, abdominal pain, nausea, vomiting, diarrhea. The patient has a history of a HeartMate II LVAD and is anticoagulated with Coumadin.     I have reviewed the Medications, Allergies, Past Medical and Surgical History, and Social History in the [employer] system.    Past Medical History   Diagnosis Date     Acute right MCA stroke (H) 6/2013     With L sided hemiparesis      Anemia of chronic disease      Baseline Hb 8-9     BPH (benign prostatic hyperplasia)      CHF (congestive heart failure), NYHA class IV (H) 10/9/2012     s/p HeartMate II.  Was on milrinone and dobutamine prior to LVAD      CKD (chronic kidney disease)      Baseline Cr=     Clostridium difficile colitis 12/2012      Dysphagia       PEG tube placed in 2012.  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  Normal carotid duplex 3/2014.       Esophagitis 12/2012     EGD with esophagitis and multiple douenal ulcers     Fracture of femoral neck, right, closed 2/3/2015     Presumed pathologic as patient is non-weight-bearing and suffered no trauma.  Family declined operative repair during hospital stay 1/23 - 1/30/15.     Gastric and duodenal angiodysplasia with hemorrhage 6/18/2015     GERD (gastroesophageal reflux disease)      Gout      HTN (hypertension)      LDL=59 (3/29/2014)     Hyperlipaemia      Ischemic cardiomyopathy      Mitral regurgitation      s/p MVR with Carbomedics ring      Myocardial infarction (H) 1998     In [facility] without intervention      Nonsenile cataract      BE     PFO (patent foramen ovale)      s/p closure (10/9/2012)     TB lung, latent      s/p 9 months INH in 2012        Past Surgical History   Procedure Laterality Date     Insert ventricular assist device left (heartmate ii)  10/9/2012     H insert icd  2/10/2011     And pacemaker.  Not BiV     Repair valve mitral  2/9/2012     28 mm Carbomedics 2/3 ring      C cabg, vein, five  2001     Repair patent foramen ovale  10/9/2012     Ir gastro jejunostomy tube placement       Esophagoscopy, gastroscopy, duodenoscopy (egd), combined N/A 6/10/2015     Procedure: COMBINED ESOPHAGOSCOPY, GASTROSCOPY, DUODENOSCOPY (EGD);  Surgeon: Edu Jenkins MD;  Location:  GI     Esophagoscopy, gastroscopy, duodenoscopy (egd), combined N/A 6/15/2015     Procedure: COMBINED ESOPHAGOSCOPY, GASTROSCOPY, DUODENOSCOPY (EGD);  Surgeon: Yury Bonner MD;  Location: [facility] OR     Phacoemulsification clear cornea with standard intraocular lens implant Right 11/19/2015     Procedure: PHACOEMULSIFICATION CLEAR CORNEA WITH STANDARD INTRAOCULAR LENS IMPLANT;  Surgeon: Violeta Cosme MD;  Location:  [facility] OR     Cataract iol, rt/lt Right 11/19/2015       Family History   Problem Relation Age of Onset     Family History Negative No family hx of      Glaucoma No family hx of      Macular Degeneration No family hx of      CANCER No family hx of      no skin cancer       Social History   Substance Use Topics     Smoking status: Former Smoker     Smokeless tobacco: Former User     Alcohol use No     No current facility-administered medications for this encounter.      Current Outpatient Prescriptions   Medication     warfarin (COUMADIN) 3 MG tablet     magnesium hydroxide (MILK OF MAGNESIA) 400 MG/5ML suspension     order for DME     guaiFENesin-dextromethorphan (ROBITUSSIN DM) 100-10 MG/5ML syrup     losartan (COZAAR) 25 MG tablet     allopurinol (ZYLOPRIM) 100 MG tablet     atorvastatin (LIPITOR) 10 MG tablet     order for DME     pantoprazole (PROTONIX) 40 MG enteric coated tablet     finasteride (PROSCAR) 5 MG tablet     tamsulosin (FLOMAX) 0.4 MG 24 hr capsule     senna-docusate (SENOKOT-S;PERICOLACE) 8.6-50 MG per tablet     order for DME     order for DME     azelastine (OPTIVAR) 0.05 % SOLN     loratadine (CLARITIN) 10 MG tablet     levETIRAcetam (KEPPRA) 750 MG tablet     ferrous sulfate (IRON) 325 (65 FE) MG tablet     ascorbic acid (VITAMIN C) 500 MG tablet     metoprolol (TOPROL-XL) 25 MG 24 hr tablet     metoprolol (TOPROL-XL) 50 MG 24 hr tablet     blood glucose (NO BRAND SPECIFIED) lancets standard     blood glucose monitoring (NO BRAND SPECIFIED) test strip     simethicone (MYLICON) 80 MG chewable tablet     order for DME     oxyCODONE (ROXICODONE) 5 MG immediate release tablet     nystatin (MYCOSTATIN) 236677 UNIT/GM POWD     BD SHARPS CONTAINER HOME MISC     carboxymethylcellulose (REFRESH PLUS) 0.5 % SOLN     order for DME     Thiamine HCl (VITAMIN B-1) 100 MG TABS     olopatadine (PATANOL) 0.1 % ophthalmic solution     nitroglycerin (NITROSTAT) 0.4 MG SL tablet     calcium carbonate-vitamin D  "500-400 MG-UNIT TABS tablt     blood glucose monitoring (NO BRAND SPECIFIED) lancets     ORDER FOR DME     acetaminophen (TYLENOL) 325 MG tablet     melatonin 1 MG TABS     bisacodyl (DULCOLAX) 5 MG EC tablet     Facility-Administered Medications Ordered in Other Encounters   Medication     oxyCODONE (ROXICODONE) 5 MG immediate release tablet        Allergies   Allergen Reactions     Seasonal Allergies        Review of Systems   Constitutional: Negative for fever.   HENT: Positive for nosebleeds.    Respiratory: Negative for cough and shortness of breath.    Cardiovascular: Negative for chest pain.   Gastrointestinal: Negative for abdominal pain, diarrhea, nausea and vomiting.   Neurological: Positive for light-headedness (resolved now) and headaches (resolved now).   All other systems reviewed and are negative.      Physical Exam   BP: 102/85  Pulse: 68  Heart Rate: 68  Temp: 97.8  F (36.6  C)  Resp: 16  Height: 172.7 cm (5' 8\")  Weight: 81.6 kg (180 lb)  SpO2: 99 %  Physical Exam    GEN:  Alert, well developed, no acute distress  HEENT:  PERRL, EOMI, Mucous membranes are moist. There is bright red blood on the nasal septum inside the left nostril. No active bleeding. During the exam, it did bleed and stopped again with direct pressure. Posterior pharynx was examined after he swished water in his mouth to rinse the blood out and there was not blood in the posterior pharynx.   Cardio:  RRR, no murmur, radial pulses equal bilaterally  PULM:  Lungs clear, good air movement, no wheezes, rales   Abd:  Soft, normal bowel sounds, no focal tenderness  Musculoskeletal:  left arm and leg have paralysis from previous stroke and this is unchanged from baseline. Right arm has normal range of motion, left leg has limited range of motion because of previous hip fracture. no lower extremity swelling or calf tenderness  Neuro:  Alert, Follows commands, chronic left sided paralysis. Normal strength in the right arm, hand and foot.  "   Skin:  Warm, dry     ED Course     ED Course     Procedures           Critical Care time:  none       Labs are normal except as shown. Labs are unchanged from baseline.   The nosebleed was controlled with direct pressure on the nasal septum and the bleeding resolved. He was observed in the Emergency Department for another hour and the bleeding did not continue. I rechecked his posterior pharynx and there was no blood in the posterior pharynx at the time of discharge.       Labs Ordered and Resulted from Time of ED Arrival Up to the Time of Departure from the ED   CBC WITH PLATELETS DIFFERENTIAL - Abnormal; Notable for the following:        Result Value    RDW 17.1 (*)     Platelet Count 136 (*)     All other components within normal limits   INR - Abnormal; Notable for the following:     INR 2.07 (*)     All other components within normal limits   COMPREHENSIVE METABOLIC PANEL - Abnormal; Notable for the following:     Glucose 134 (*)     Creatinine 1.82 (*)     GFR Estimate 37 (*)     GFR Estimate If Black 45 (*)     All other components within normal limits       Assessments & Plan (with Medical Decision Making)   This is a patient who presented with bleeding from the left nostril. He developed some light headedness and headache at the time as well, however, all of these have resolved. Patient s labs are unchanged from baseline and the nosebleed has stopped so I don t think the patient needs further workup. He will be discharged to home and advised to use Vasoline to keep the mucus membranes in the nose moist and to follow up with his primary care physician or ENT if he has problems with recurrent nosebleeds. Patient and his daughter were advised to put direct pressure on the nasal septum if the bleeding returns. If they are not able to control the bleeding, they should return to the Emergency Department.     I have reviewed the nursing notes.    I have reviewed the findings, diagnosis, plan and need for follow  up with the patient.    Discharge Medication List as of 3/15/2017 12:00 PM          Final diagnoses:   Epistaxis   Lightheadedness   Nathalie BERRIOS, am serving as a trained medical scribe to document services personally performed by Lily Garcia MD, based on the provider's statements to me.      Lily BERRIOS MD, was physically present and have reviewed and verified the accuracy of this note documented by Nathalie Amaral.       3/15/2017   [facility], [place], EMERGENCY DEPARTMENT     Lily Garcia MD  03/15/17 0948

## 2017-03-16 ENCOUNTER — OFFICE VISIT (OUTPATIENT)
Dept: PHARMACY | Facility: CLINIC | Age: 67
End: 2017-03-16
Payer: COMMERCIAL

## 2017-03-16 ENCOUNTER — OFFICE VISIT (OUTPATIENT)
Dept: FAMILY MEDICINE | Facility: CLINIC | Age: 67
End: 2017-03-16
Payer: MEDICARE

## 2017-03-16 VITALS
SYSTOLIC BLOOD PRESSURE: 98 MMHG | DIASTOLIC BLOOD PRESSURE: 58 MMHG | HEART RATE: 60 BPM | RESPIRATION RATE: 14 BRPM | OXYGEN SATURATION: 100 %

## 2017-03-16 DIAGNOSIS — K59.01 SLOW TRANSIT CONSTIPATION: ICD-10-CM

## 2017-03-16 DIAGNOSIS — I63.411 CEREBRAL INFARCTION DUE TO EMBOLISM OF RIGHT MIDDLE CEREBRAL ARTERY (H): Primary | ICD-10-CM

## 2017-03-16 DIAGNOSIS — Z96.1 PSEUDOPHAKIA, RIGHT EYE: ICD-10-CM

## 2017-03-16 DIAGNOSIS — H57.89 IRRITATION OF RIGHT EYE: ICD-10-CM

## 2017-03-16 DIAGNOSIS — N40.0 BENIGN NON-NODULAR PROSTATIC HYPERPLASIA WITHOUT LOWER URINARY TRACT SYMPTOMS: ICD-10-CM

## 2017-03-16 DIAGNOSIS — R56.9 CONVULSIONS, UNSPECIFIED CONVULSION TYPE (H): ICD-10-CM

## 2017-03-16 DIAGNOSIS — R05.3 CHRONIC COUGH: ICD-10-CM

## 2017-03-16 DIAGNOSIS — M54.2 NECK PAIN: ICD-10-CM

## 2017-03-16 DIAGNOSIS — Z95.811 LVAD (LEFT VENTRICULAR ASSIST DEVICE) PRESENT (H): ICD-10-CM

## 2017-03-16 DIAGNOSIS — H26.9 CATARACT, RIGHT: ICD-10-CM

## 2017-03-16 DIAGNOSIS — J30.1 NON-SEASONAL ALLERGIC RHINITIS DUE TO POLLEN: ICD-10-CM

## 2017-03-16 DIAGNOSIS — R13.12 OROPHARYNGEAL DYSPHAGIA: ICD-10-CM

## 2017-03-16 DIAGNOSIS — K21.00 GASTROESOPHAGEAL REFLUX DISEASE WITH ESOPHAGITIS: ICD-10-CM

## 2017-03-16 DIAGNOSIS — I25.5 ISCHEMIC CARDIOMYOPATHY: ICD-10-CM

## 2017-03-16 DIAGNOSIS — I63.411 CEREBRAL INFARCTION DUE TO EMBOLISM OF RIGHT MIDDLE CEREBRAL ARTERY (H): ICD-10-CM

## 2017-03-16 DIAGNOSIS — G47.00 INSOMNIA, UNSPECIFIED TYPE: ICD-10-CM

## 2017-03-16 DIAGNOSIS — M54.2 NECK PAIN: Primary | ICD-10-CM

## 2017-03-16 DIAGNOSIS — M1A.20X0 CHRONIC GOUT DUE TO DRUG WITHOUT TOPHUS, UNSPECIFIED SITE: ICD-10-CM

## 2017-03-16 DIAGNOSIS — S72.001K: ICD-10-CM

## 2017-03-16 DIAGNOSIS — I50.22 CHF (CONGESTIVE HEART FAILURE), NYHA CLASS IV, CHRONIC, SYSTOLIC (H): ICD-10-CM

## 2017-03-16 PROCEDURE — 99605 MTMS BY PHARM NP 15 MIN: CPT | Mod: GY | Performed by: PHARMACIST

## 2017-03-16 PROCEDURE — 99350 HOME/RES VST EST HIGH MDM 60: CPT

## 2017-03-16 RX ORDER — LANOLIN ALCOHOL/MO/W.PET/CERES
3 CREAM (GRAM) TOPICAL AT BEDTIME
COMMUNITY
Start: 2017-03-16 | End: 2018-01-01

## 2017-03-16 NOTE — MR AVS SNAPSHOT
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, unspecified site        Benign non-nodular prostatic hyperplasia without lower urinary tract symptoms        Slow transit constipation        Gastroesophageal reflux disease with esophagitis        Neck pain          Care Instructions    See AVS from PCP encounter for details         Follow-ups after your visit        Your next 10 appointments already scheduled     Apr 11, 2017  2:00 PM CDT   [facility]    [address]  [address]  [facility] [address]   [phone]            May 09, 2017  2:00 PM CDT   [facility]    [address]  [address]  [facility] [address]   [phone]            May 11, 2017 10:00 AM CDT   Ech Complete Lvad* with UCECHCR2    [facility] ([facility])    [address] [place] [address]  [address] [facility] [id number] [phone]           1.  Please bring or wear a comfortable two-piece outfit. 2.  You may eat, drink and take your normal medicines. 3.  For any questions that cannot be answered, please contact the ordering physician            May 11, 2017 11:00 AM CDT   Lab with  LAB    [facility] ([facility])    [address] [place] [address]  1st [facility]  "[id number]   [phone]            May 11, 2017 11:30 AM CDT   (Arrive by 11:15 AM)   Implanted Defibulator with Uc Cv Device 1   [facility] ([facility])    [address] [place] [address]  [address] [facility] [address]   [phone]            May 11, 2017 12:00 PM CDT   (Arrive by 11:45 AM)   Ventricular Assist Device with Maureen Grant NP   [facility] ([facility])    [address] [place] [address]  [address] [facility] [address]   [phone]            Jun 13, 2017  2:00 PM CDT   Return Visit with Thomas Dill MD   [facility] ([facility])    [address] [place] [address]  [address]  [facility] [id number] [phone]              Who to contact     If you have questions or need follow up information about today's clinic visit or your schedule please contact [facility] directly at [phone].  Normal or non-critical lab and imaging results will be communicated to you by [employer], letter or phone within 4 business days after the clinic has received the results. If you do not hear from us within 7 days, please contact the clinic through [employer] or phone. If you have a critical or abnormal lab result, we will notify you by phone as soon as possible.  Submit refill requests through [employer] or call your pharmacy and they will forward the refill request to us. Please allow 3 business days for your refill to be completed.          Additional Information About Your Visit        [employer] Information     [employer] lets you send messages to your doctor, view your test results, renew your prescriptions, schedule appointments and more. To sign up, go to [email or web address] . Click on \"Log in\" on the left side of the screen, which will take you to the Welcome page. Then click on \"Sign up Now\" on the right side of the page.     You will be asked to enter the access code listed below, as " well as some personal information. Please follow the directions to create your username and password.     Your access code is: [email or web address]  Expires: 2017 12:00 PM     Your access code will  in 90 days. If you need help or a new code, please call your [facility] clinic or [phone].        Care EveryWhere ID     This is your Care EveryWhere ID. This could be used by other organizations to access your [facility] medical records  [id number]         Blood Pressure from Last 3 Encounters:   17 105/87   17 98/58   03/15/17 102/85    Weight from Last 3 Encounters:   03/15/17 180 lb (81.6 kg)   17 182 lb (82.6 kg)   17 98 lb 8 oz (44.7 kg)              Today, you had the following     No orders found for display         Today's Medication Changes          These changes are accurate as of: 3/16/17 11:59 PM.  If you have any questions, ask your nurse or doctor.               These medicines have changed or have updated prescriptions.        Dose/Directions    acetaminophen 325 MG tablet   Commonly known as:  TYLENOL   This may have changed:    - when to take this  - reasons to take this   Used for:  Femoral neck fracture, right, closed, initial encounter        Dose:  650 mg   Take 2 tablets (650 mg) by mouth every 6 hours   Quantity:  100 tablet   Refills:  0       azelastine 0.05 % Soln ophthalmic solution   Commonly known as:  OPTIVAR   This may have changed:    - when to take this  - reasons to take this        Dose:  1 drop   Apply 1 drop to eye 2 times daily   Quantity:  1 Bottle   Refills:  11       calcium carbonate-vitamin D 500-400 MG-UNIT Tabs per tablet   This may have changed:  when to take this   Used for:  Fracture of femoral neck, right, closed, initial encounter        Dose:  1 tablet   Take 1 tablet by mouth 2 times daily   Quantity:  180 tablet   Refills:  3       melatonin 3 MG tablet   This may have changed:    - medication strength  - how much to take   Used for:   Insomnia, unspecified type   Changed by:  Thomas Dill MD        Dose:  3 mg   Take 1 tablet (3 mg) by mouth At Bedtime   Refills:  0       Vitamin B-1 100 MG Tabs   This may have changed:  See the new instructions.   Used for:  Cerebrovascular accident (CVA) due to embolism of right middle cerebral artery (H)   Changed by:  Thomas Dill MD        TAKE 1 TABLET (100 MG) BY MOUTH DAILY   Quantity:  90 tablet   Refills:  3            Where to get your medicines      These medications were sent to [facility]/pharmacy #[id number] - [place], [place] - [address]  [address], [place] [address]     Phone:  [phone]     Vitamin B-1 100 MG Tabs                Primary Care Provider Office Phone # Fax #    Thomas Dill [phone]       [facility] [address] [address]        Thank you!     Thank you for choosing [facility]  for your care. Our goal is always to provide you with excellent care. Hearing back from our patients is one way we can continue to improve our services. Please take a few minutes to complete the written survey that you may receive in the mail after your visit with us. Thank you!             Your Updated Medication List - Protect others around you: Learn how to safely use, store and throw away your medicines at www.disposemymeds.org.          This list is accurate as of: 3/16/17 11:59 PM.  Always use your most recent med list.                   Brand Name Dispense Instructions for use    acetaminophen 325 MG tablet    TYLENOL    100 tablet    Take 2 tablets (650 mg) by mouth every 6 hours       allopurinol 100 MG tablet    ZYLOPRIM    90 tablet    Take 1 tablet (100 mg) by mouth daily       ascorbic acid 500 MG tablet    VITAMIN C    90 tablet    Take 1 tablet (500 mg) by mouth daily With iron pill       atorvastatin 10 MG tablet    LIPITOR    30 tablet    Take 1 tablet (10 mg) by  mouth daily       azelastine 0.05 % Soln ophthalmic solution    OPTIVAR    1 Bottle    Apply 1 drop to eye 2 times daily       bisacodyl 5 MG EC tablet    DULCOLAX    20 tablet    Take 1 tablet (5 mg) by mouth daily as needed for constipation       blood glucose monitoring lancets     1 Box    Use to test blood sugars 2 times daily or as directed.       blood glucose monitoring test strip    no brand specified    1 Box    Use to test blood sugar 2 times daily or as directed.       calcium carbonate-vitamin D 500-400 MG-UNIT Tabs per tablet     180 tablet    Take 1 tablet by mouth 2 times daily       carboxymethylcellulose 0.5 % Soln ophthalmic solution    REFRESH PLUS    30 mL    Place 1 drop into the right eye 3 times daily as needed for other (eye irritation or discomfort.)       ferrous sulfate 325 (65 FE) MG tablet    IRON    90 tablet    Take 1 tablet (325 mg) by mouth daily (with breakfast)       guaiFENesin-dextromethorphan 100-10 MG/5ML syrup    ROBITUSSIN DM    560 mL    Take 5 mLs by mouth every 4 hours as needed for cough       levETIRAcetam 750 MG tablet    KEPPRA    180 tablet    Take 1 tablet (750 mg) by mouth 2 times daily       loratadine 10 MG tablet    CLARITIN    90 tablet    Take 1 tablet (10 mg) by mouth daily       losartan 25 MG tablet    COZAAR    45 tablet    Take 1 tablet (25 mg) by mouth daily       magnesium hydroxide 400 MG/5ML suspension    MILK OF MAGNESIA    473 mL    Take 30 mLs by mouth daily as needed for heartburn       melatonin 3 MG tablet      Take 1 tablet (3 mg) by mouth At Bedtime       * metoprolol 25 MG 24 hr tablet    TOPROL-XL    90 tablet    Take 1 tablet (25 mg) by mouth every evening       * metoprolol 50 MG 24 hr tablet    TOPROL-XL    90 tablet    Take 1 tablet (50 mg) by mouth daily       nitroglycerin 0.4 MG sublingual tablet    NITROSTAT    30 tablet    Place 1 tablet (0.4 mg) under the tongue every 5 minutes as needed for chest pain       nystatin [id number] UNIT/GM  Powd    MYCOSTATIN    60 g    Apply to affected areas of skin in the groin and on the scrotum three times a day as needed.       olopatadine 0.1 % ophthalmic solution    PATANOL    1 Bottle    Place 1 drop into both eyes 2 times daily       * order for DME     1 Device    Equipment being ordered: Lift chair.  Please see indications and face-to-face encounter details in 2/3/15 Office Visit note.       * order for DME     2 each    Equipment being ordered: Bilateral leg supports for manual wheelchair.       * order for DME     1 each    Equipment being ordered: Reclining manual w/c with bilateral elevating leg rests.       * order for DME     1 each    Equipment being ordered: Hospital Bed, fully electric.       * order for DME     1 each    Equipment being ordered: Wheelchair, manual.       * order for DME     1 each    Equipment being ordered: Wheelchair, manual, with elevated leg rests and tilt in space back.  [facility] I called them 11/26/16 and they requested the new order.  Face to face is in my 10/26/16 note.       * order for DME     2 each    Equipment being ordered: Cushioned heel boots.       oxyCODONE 5 MG IR tablet    ROXICODONE    90 tablet    Take 1 tablet (5 mg) by mouth every 4 hours as needed for moderate to severe pain       pantoprazole 40 MG EC tablet    PROTONIX    180 tablet    Take 1 tablet (40 mg) by mouth daily       senna-docusate 8.6-50 MG per tablet    SENOKOT-S;PERICOLACE    60 tablet    Take 1-2 tablets by mouth 2 times daily as needed for constipation       simethicone 80 MG chewable tablet    MYLICON    180 tablet    Take 1 tablet (80 mg) by mouth 4 times daily       tamsulosin 0.4 MG capsule    FLOMAX    90 capsule    Take 1 capsule (0.4 mg) by mouth daily       Vitamin B-1 100 MG Tabs     90 tablet    TAKE 1 TABLET (100 MG) BY MOUTH DAILY       warfarin 3 MG tablet    COUMADIN    180 tablet    Take 3mg by mouth on Mondays and Thursdays. Take 4.5mg by mouth all other days  of the week.       * Notice:  This list has 9 medication(s) that are the same as other medications prescribed for you. Read the directions carefully, and ask your doctor or other care provider to review them with you.

## 2017-03-16 NOTE — PATIENT INSTRUCTIONS
1.  I made the following referrals:     A. [facility] regarding the itching and burning of your right eye.   B. [facility] regarding your neck and posterior skull pain.    You'll receive a call to schedule each of these.    2.  We made no medications changes today.    3.  I'll be back to see you on Tuesday, April 11th at 2 PM.

## 2017-03-16 NOTE — PROGRESS NOTES
SUBJECTIVE/OBJECTIVE:                                                    Sohan Rendon is a 67 year old male seen at their home for a transitions of care visit.  He was discharged from [facility] on 2/15/17 for appendicitis. Seen as a covisit with PCP and Dustin . Daughter provided much of the information regarding medication administration and speaks English well.     Chief Complaint: no medication concerns.    Allergies/ADRs: Reviewed in Epic  Tobacco: History of tobacco dependence - quit     Alcohol: not currently using  Caffeine: 0-2 cups/day of tea  Activity: bed/chair bound 2/2 stroke  PMH: Reviewed in Epic   Per PCP notes:  LVAD due to severe ischemic cardiomyopathy. He has had recurrent embolic strokes, despite careful anticoagulation, with the LVAD believed to be the source. He was hospitalized 9/23 - 10/4/14 for acute ischemic subcortical stroke, superimposed upon previous R PICA and R MCA strokes. During that hospital stay, several discussions were carried out with the patient's family regarding the possibility of LVAD removal and conversion to Hospice. Per my discussion with his attending cardiologist, Dr. Lemons, family are well aware of the patient's terminal condition, but they steadfastly decline enrolling him into Hospice. They have wished to continue LVAD and other cares at home, while ensuring his comfort.     Medication Adherence: no issues reported, has assistance setting up med boxes (daughters) and takes meds 3 times per day. Daughter is confident the pt is receiving all doses of medications and communicates well with other caretakers and those that also administer his medications.    Cardiomyopathy/LVAD/hx multiple CVAs: currently taking warfarin for anticoagulation, losartan 25mg daily, metoprolol 50mg AM and 25mg PM. Has been holding aspirin due to hematuria per urology. INR goal reduced to 1.8-2.3 as well. No side effect concerns noted.  Hx seizure: currently taking Keppra 750mg BID  without problems. No seizure activity noted since starting Keppra.  Gout: currently taking allopurinol 100mg daily without problems.  Uric Acid   Date Value Ref Range Status   11/11/2015 4.8 3.5 - 7.2 mg/dL Final   ]  BPH: currently taking Flomax daily without problems and no urination concerns. Has not been taking finasteride and no change in symptoms noted.  Constipation: currently taking Senna once daily and additional tablets as needed, which is working well.   GERD: Current medications include: Protonix (pantoprazole) 40mg daily and Milk of Mag prn.  Patient feels that current regimen is effective.  Pain: concerns today of shoulder pain, see PCP note for additional details. Has oxycodone available for pain but takes very infrequently, less than monthly.     Current labs include:  BP Readings from Last 3 Encounters:   03/16/17 98/58   03/15/17 102/85   02/23/17 96/60       Lab Results   Component Value Date    A1C 5.2 07/21/2016   .  Lab Results   Component Value Date    ALT 20 03/15/2017     Lab Results   Component Value Date    UCRR 50 06/17/2016       Last Basic Metabolic Panel:  Lab Results   Component Value Date     03/15/2017      Lab Results   Component Value Date    POTASSIUM 4.0 03/15/2017     Lab Results   Component Value Date    CHLORIDE 109 03/15/2017     Lab Results   Component Value Date    BUN 23 03/15/2017     Lab Results   Component Value Date    CR 1.82 03/15/2017     GFR Estimate   Date Value Ref Range Status   03/15/2017 37 (L) >60 mL/min/1.7m2 Final     Comment:     Non  GFR Calc   02/21/2017 44 (L) >60 mL/min/1.7m2 Final     Comment:     Non  GFR Calc   02/20/2017 43 (L) >60 mL/min/1.7m2 Final     Comment:     Non  GFR Calc     GFR Estimate If Black   Date Value Ref Range Status   03/15/2017 45 (L) >60 mL/min/1.7m2 Final     Comment:      GFR Calc   02/21/2017 53 (L) >60 mL/min/1.7m2 Final     Comment:       "American GFR Calc   02/20/2017 52 (L) >60 mL/min/1.7m2 Final     Comment:      GFR Calc     TSH   Date Value Ref Range Status   08/10/2015 1.32 0.40 - 4.00 mU/L Final   ]    Most Recent Immunizations   Administered Date(s) Administered     Influenza (IIV3) 10/08/2015     Influenza Vaccine IM 3yrs+ 4 Valent IIV4 10/20/2016     Pneumococcal 23 valent 08/17/2014     Est CrCl ([place]ro[place]-Gault, IBW 68.4kg) ~ 39 ml/min  Wt Readings from Last 1 Encounters:   03/15/17 180 lb (81.6 kg)     Ht Readings from Last 1 Encounters:   03/15/17 5' 8\" (1.727 m)     Creatinine   Date Value Ref Range Status   03/15/2017 1.82 (H) 0.66 - 1.25 mg/dL Final   ]    ASSESSMENT:                                                       Current medications were reviewed today.      Medication Adherence: no issues identified    Checked medications for drug interactions, and the following clinically significant interactions were found:  1. Decreased absorption of the following medications with administration of Milk of Mag: allopurinol, ferrous sulfate, atorvastatin.   If pt is having to use Milk of Mag frequently, would consider an alternative option such as H2 blocker or BID dosing of PPI to eliminate drug interaction.     Cardiomyopathy/LVAD/hx multiple CVAs: stable. INR at goal 1.8-2.3.   Hx seizure: stable  Gout: stable  BPH: stable  Constipation: stable  GERD: stable. See comment regarding drug interactions and use of Milk of Mag as above  Pain: needs improvement. Pt referred for PT, see PCP note for additional details. Changes in medication therapy do not seem necessary at this time.       PLAN:                                                      Post Discharge Medication Reconciliation Status: discharge medications reconciled, continue medications without change.    No medication changes    I spent 40 minutes with this patient today.  All changes were made via collaborative practice agreement with Thomas Dill. A copy of " the visit note was provided to the patient's primary care provider.    Will follow up as needed.    The patient was mailed a summary of these recommendations as an after visit summary.    Joann Muse, PharmD, BCACP

## 2017-03-16 NOTE — MR AVS SNAPSHOT
After Visit Summary   3/16/2017    Sohan Rendon    MRN: [id number]           Patient Information     Date Of Birth          1950        Visit Information        Provider Department      3/16/2017 12:00 PM Thomas Dill MD; [employer] [facility]        Today's Diagnoses     Neck pain    -  1    Irritation of right eye        Cataract, right        Convulsions, unspecified convulsion type (H)        Pseudophakia, right eye        CHF (congestive heart failure), NYHA class IV, chronic, systolic (H)        Insomnia, unspecified type        LVAD (left ventricular assist device) present        Displaced fracture of right femoral neck, closed, with nonunion, subsequent encounter        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  I made the following referrals:     A. [facility] regarding the itching and burning of your right eye.   B. [facility] regarding your neck and posterior skull pain.    You'll receive a call to schedule each of these.    2.  We made no medications changes today.    3.  I'll be back to see you on Tuesday, April 11th at 2 PM.        Follow-ups after your visit        Additional Services     HOME CARE NURSING REFERRAL       **Order classes of: [facility], [facility] and [facility] will route to the Home Care and Hospice Referral Pool.  Home Care or Hospice will then contact the patient to schedule their appointment.**    If you do not hear from Home Care and Hospice, or you would like to call to schedule, please call the referring place of service that your provider has listed below.  ______________________________________________________________________    Your provider has referred you to: [facility]  [facility] [phone]   [email or web address]    Extended Emergency Contact Information  Primary Emergency Contact: [facility]  Home Phone: [phone]  Mobile Phone: [phone]  Relation: Daughter  Secondary Emergency Contact: MICHA[place] [address] [facility]  Home Phone: [phone]  Mobile Phone: [phone]  Relation: Daughter  Preferred language: Emirati    Patient Anticipated Discharge Date: N/A patient not hospitalized, but is home bound.   RN, PT, HHA to begin 24 - 48 hours after discharge.  PLEASE EVALUATE AND TREAT (Evaluation timeline is 24 - 48 hrs. Please call if there is need for a variance to this timeline).    REASON FOR REFERRAL: Assessment & Treatment: PT    ADDITIONAL SERVICES NEEDED: None.    OTHER PERTINENT INFORMATION: Patient was last seen by provider on 3/16/17 for chronic recurring pain of the left neck and posterior head.    Current Outpatient Prescriptions:  melatonin 3 MG tablet, Take 1 tablet (3 mg) by mouth At Bedtime, Disp: , Rfl:   magnesium hydroxide (MILK OF MAGNESIA) 400 MG/5ML suspension, Take 30 mLs by mouth daily as needed for heartburn, Disp: 473 mL, Rfl: 1  losartan (COZAAR) 25 MG tablet, Take 1 tablet (25 mg) by mouth daily, Disp: 45 tablet, Rfl: 3  allopurinol (ZYLOPRIM) 100 MG tablet, Take 1 tablet (100 mg) by mouth daily, Disp: 90 tablet, Rfl: 3  atorvastatin (LIPITOR) 10 MG tablet, Take 1 tablet (10 mg) by mouth daily, Disp: 30 tablet, Rfl: 5  pantoprazole (PROTONIX) 40 MG enteric coated tablet, Take 1 tablet (40 mg) by mouth daily, Disp: 180 tablet, Rfl: 3  warfarin (COUMADIN) 3 MG tablet, Take 3mg by mouth on Mondays and Thursdays. Take 4.5mg by mouth all other days of the week., Disp: 180 tablet, Rfl: 3  tamsulosin (FLOMAX) 0.4 MG 24 hr capsule, Take 1 capsule (0.4 mg) by mouth daily, Disp: 90 capsule, Rfl: 3  loratadine (CLARITIN) 10 MG  tablet, Take 1 tablet (10 mg) by mouth daily, Disp: 90 tablet, Rfl: 3  levETIRAcetam (KEPPRA) 750 MG tablet, Take 1 tablet (750 mg) by mouth 2 times daily, Disp: 180 tablet, Rfl: 3  ferrous sulfate (IRON) 325 (65 FE) MG tablet, Take 1 tablet (325 mg) by mouth daily (with breakfast), Disp: 90 tablet, Rfl: 3  ascorbic acid (VITAMIN C) 500 MG tablet, Take 1 tablet (500 mg) by mouth daily With iron pill, Disp: 90 tablet, Rfl: 3  metoprolol (TOPROL-XL) 25 MG 24 hr tablet, Take 1 tablet (25 mg) by mouth every evening, Disp: 90 tablet, Rfl: 3  metoprolol (TOPROL-XL) 50 MG 24 hr tablet, Take 1 tablet (50 mg) by mouth daily, Disp: 90 tablet, Rfl: 3  calcium carbonate-vitamin D 500-400 MG-UNIT TABS tablt, Take 1 tablet by mouth 2 times daily (Patient taking differently: Take 1 tablet by mouth daily ), Disp: 180 tablet, Rfl: 3  Thiamine HCl (VITAMIN B-1) 100 MG TABS, TAKE 1 TABLET (100 MG) BY MOUTH DAILY, Disp: 90 tablet, Rfl: 3  order for DME, Equipment being ordered: Cushioned heel boots., Disp: 2 each, Rfl: 0  guaiFENesin-dextromethorphan (ROBITUSSIN DM) 100-10 MG/5ML syrup, Take 5 mLs by mouth every 4 hours as needed for cough, Disp: 560 mL, Rfl: 3  order for DME, Equipment being ordered: Wheelchair, manual, with elevated leg rests and tilt in space back.  Please fax to [facility]; I called them 11/26/16 and they requested the new order.  Face to face is in my 10/26/16 note., Disp: 1 each, Rfl: 0  senna-docusate (SENOKOT-S;PERICOLACE) 8.6-50 MG per tablet, Take 1-2 tablets by mouth 2 times daily as needed for constipation, Disp: 60 tablet, Rfl: 3  order for DME, Equipment being ordered: Wheelchair, manual., Disp: 1 each, Rfl: 0  order for DME, Equipment being ordered: Hospital Bed, fully electric., Disp: 1 each, Rfl: 0  azelastine (OPTIVAR) 0.05 % SOLN, Apply 1 drop to eye 2 times daily (Patient taking differently: Apply 1 drop to eye as needed ), Disp: 1 Bottle, Rfl: 11  blood glucose monitoring (NO BRAND SPECIFIED) test  strip, Use to test blood sugar 2 times daily or as directed., Disp: 1 Box, Rfl: 3  simethicone (MYLICON) 80 MG chewable tablet, Take 1 tablet (80 mg) by mouth 4 times daily, Disp: 180 tablet, Rfl: 5  order for DME, Equipment being ordered: Reclining manual w/c with bilateral elevating leg rests., Disp: 1 each, Rfl: 0  oxyCODONE (ROXICODONE) 5 MG immediate release tablet, Take 1 tablet (5 mg) by mouth every 4 hours as needed for moderate to severe pain, Disp: 90 tablet, Rfl: 0  nystatin (MYCOSTATIN) 056246 UNIT/GM POWD, Apply to affected areas of skin in the groin and on the scrotum three times a day as needed., Disp: 60 g, Rfl: 3  carboxymethylcellulose (REFRESH PLUS) 0.5 % SOLN, Place 1 drop into the right eye 3 times daily as needed for other (eye irritation or discomfort.), Disp: 30 mL, Rfl: 3  order for DME, Equipment being ordered: Bilateral leg supports for manual wheelchair., Disp: 2 each, Rfl: 0  olopatadine (PATANOL) 0.1 % ophthalmic solution, Place 1 drop into both eyes 2 times daily, Disp: 1 Bottle, Rfl: 11  nitroglycerin (NITROSTAT) 0.4 MG SL tablet, Place 1 tablet (0.4 mg) under the tongue every 5 minutes as needed for chest pain, Disp: 30 tablet, Rfl: 1  blood glucose monitoring (NO BRAND SPECIFIED) lancets, Use to test blood sugars 2 times daily or as directed., Disp: 1 Box, Rfl: 3  ORDER FOR DME, Equipment being ordered: Lift chair.    Please see indications and face-to-face encounter details in 2/3/15 Office Visit note., Disp: 1 Device, Rfl: 0  acetaminophen (TYLENOL) 325 MG tablet, Take 2 tablets (650 mg) by mouth every 6 hours (Patient taking differently: Take 650 mg by mouth as needed ), Disp: 100 tablet, Rfl: 0  bisacodyl (DULCOLAX) 5 MG EC tablet, Take 1 tablet (5 mg) by mouth daily as needed for constipation, Disp: 20 tablet, Rfl: 1      Patient Active Problem List:     Subtherapeutic international normalized ratio (INR)     Cardiac dysrhythmia     LVAD (left ventricular assist device)  present     CHF (congestive heart failure), NYHA class IV (H)     Mitral regurgitation     TB lung, latent     HTN (hypertension)     Hyperlipidemia     PFO (patent foramen ovale)     BPH (benign prostatic hyperplasia)     GERD (gastroesophageal reflux disease)     CKD (chronic kidney disease)     Embolism of posterior inferior cerebellar artery     Clostridium difficile colitis     Dysphagia     Gout     Anemia of chronic disease     Myocardial infarction (H)     Ischemic cardiomyopathy     Hemolysis     Implantable cardioverter-defibrillator in situ- [employer]-single chamber- NOT dependent     Thyroid nodule     Lung nodule, multiple     Displaced fracture of right femoral neck, closed, with nonunion, subsequent encounter     Cataract, right     Gastric and duodenal angiodysplasia with hemorrhage     Seizure (H)     Headache     Coronary artery disease involving native coronary artery with unstable angina pectoris (H)     Slow transit constipation     Pseudophakia, right eye     Abscess of deep perineal space     Complication associated with cardiac pacemaker lead     Insomnia due to medical condition     Neck pain     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    This encounter with the patient was in whole, or in part, for the following medical condition, which is the primary reason for Home Health Care: Chronic recurring pain of the  left neck and posterior skull which I think is muscular in etiology.    I certify that, based on my findings, the following services are medically necessary Home Health Services: Physical Therapy    My clinical findings support the need for the above services because: Physical Therapy Services are needed to assess and treat the following functional impairments: Chronic recurring pain of the left neck and posterior skull which I think is muscular in etiology.    Further, I certify that my clinical findings support that this patient is homebound (i.e. absences from home require considerable and taxing effort and are for medical reasons or [facility] services or infrequently or of short duration when for other reasons) because: Requires assistance of another person or specialized equipment to access medical services because patient:  Has dense left hemiparesis from previous stroke, and has LVAD in place.    Based on the above findings, I certify that this patient is confined to the home and needs intermittent skilled nursing care, physical therapy and/or speech therapy.  The patient is under my care, and I have initiated the establishment of the plan of care.  This patient will be followed by a physician who will periodically review the plan of care.    Physician/Provider to provide follow up care: Thomas Dill    [facility] NAVEEN certified Physician at time of discharge: Thomas Dill MD    Please be aware that coverage of these services is subject to the terms and limitations of your health insurance plan.  Call member services at your health plan with any benefit or coverage questions.            OPHTHALMOLOGY ADULT REFERRAL       Your provider has referred you to: [facility]: [facility] [phone]   [email or web address]    REASON FOR CONSULT: Is S/P cataract surgery 11/2015 by Dr. Cosme.  Patient requests clinic referral regarding itching and burning of OD without  decline in acuity.  Thank you.    Please be aware that coverage of these services is subject to the terms and limitations of your health insurance plan.  Call member services at your health plan with any benefit or coverage questions.      Please bring the following with you to your appointment:    (1) Any X-Rays, CTs or MRIs which have been performed.  Contact the facility where they were done to arrange for  prior to your scheduled appointment.    (2) List of current medications  (3) This referral request   (4) Any documents/labs given to you for this referral                  Your next 10 appointments already scheduled     Apr 11, 2017  2:00 PM CDT   Return Visit with Thomas Dill MD   [facility] ([facility])    [address]  [address]  [facility] [address]   [phone]            May 09, 2017  2:00 PM CDT   Return Visit with Thomas Dill MD   [facility] ([facility])    [address]  [address]  [facility] [address]   [phone]            May 11, 2017 10:00 AM CDT   Ech Complete Lvad* with [id number]   [facility] ([facility])    [address] [place] [id number] [phone]           1.  Please bring or wear a comfortable two-piece outfit. 2.  You may eat, drink and take your normal medicines. 3.  For any questions that cannot be answered, please contact the ordering physician            May 11, 2017 11:00 AM CDT   Lab with  LAB    [facility] ([facility])    [address] [place] [id number] [phone]            May 11, 2017 11:30 AM CDT   (Arrive by 11:15 AM)   Implanted Defibulator with  Cv Device 1   [facility] ([facility])    [address] [address] [phone]            May 11, 2017 12:00 PM CDT   (Arrive by 11:45  "AM)   Ventricular Assist Device with Maureen Grant NP   [facility] ([facility])    [address]  3rd Floor  [facility] [id number] [phone]            2017  2:00 PM CDT   Return Visit with Thomas Dill MD   [facility] ([facility])    [address]  [address]  [facility] [address] [phone]              Who to contact     If you have questions or need follow up information about today's clinic visit or your schedule please contact [facility] directly at [phone].  Normal or non-critical lab and imaging results will be communicated to you by MyChart, letter or phone within 4 business days after the clinic has received the results. If you do not hear from us within 7 days, please contact the clinic through MyChart or phone. If you have a critical or abnormal lab result, we will notify you by phone as soon as possible.  Submit refill requests through [employer] or call your pharmacy and they will forward the refill request to us. Please allow 3 business days for your refill to be completed.          Additional Information About Your Visit        [email or web address] Information     Spout lets you send messages to your doctor, view your test results, renew your prescriptions, schedule appointments and more. To sign up, go to [email or web address] . Click on \"Log in\" on the left side of the screen, which will take you to the Welcome page. Then click on \"Sign up Now\" on the right side of the page.     You will be asked to enter the access code listed below, as well as some personal information. Please follow the directions to create your username and password.     Your access code is: [email or web address]  Expires: 2017 12:00 PM     Your access code will  in 90 days. If you need help or a new code, please call your [facility] or [phone].        Care EveryWhere ID     This is your " Care EveryWhere ID. This could be used by other organizations to access your [place] medical records  [id number]        Your Vitals Were     Pulse Respirations Pulse Oximetry             60 14 100%          Blood Pressure from Last 3 Encounters:   03/16/17 98/58   03/15/17 102/85   02/23/17 96/60    Weight from Last 3 Encounters:   03/15/17 180 lb (81.6 kg)   02/21/17 182 lb (82.6 kg)   02/11/17 98 lb 8 oz (44.7 kg)              We Performed the Following     HOME CARE NURSING REFERRAL     OPHTHALMOLOGY ADULT REFERRAL          Today's Medication Changes          These changes are accurate as of: 3/16/17 11:59 PM.  If you have any questions, ask your nurse or doctor.               These medicines have changed or have updated prescriptions.        Dose/Directions    acetaminophen 325 MG tablet   Commonly known as:  TYLENOL   This may have changed:    - when to take this  - reasons to take this   Used for:  Femoral neck fracture, right, closed, initial encounter        Dose:  650 mg   Take 2 tablets (650 mg) by mouth every 6 hours   Quantity:  100 tablet   Refills:  0       azelastine 0.05 % Soln ophthalmic solution   Commonly known as:  OPTIVAR   This may have changed:    - when to take this  - reasons to take this        Dose:  1 drop   Apply 1 drop to eye 2 times daily   Quantity:  1 Bottle   Refills:  11       calcium carbonate-vitamin D 500-400 MG-UNIT Tabs per tablet   This may have changed:  when to take this   Used for:  Fracture of femoral neck, right, closed, initial encounter        Dose:  1 tablet   Take 1 tablet by mouth 2 times daily   Quantity:  180 tablet   Refills:  3       melatonin 3 MG tablet   This may have changed:    - medication strength  - how much to take   Used for:  Insomnia, unspecified type   Changed by:  Thomas Dill MD        Dose:  3 mg   Take 1 tablet (3 mg) by mouth At Bedtime   Refills:  0       Vitamin B-1 100 MG Tabs   This may have changed:  See the new instructions.    Used for:  Cerebrovascular accident (CVA) due to embolism of right middle cerebral artery (H)   Changed by:  Thomas Dill MD        TAKE 1 TABLET (100 MG) BY MOUTH DAILY   Quantity:  90 tablet   Refills:  3            Where to get your medicines      These medications were sent to [id number] - [place], [place] - [address]  [address], [place] [address]     Phone:  [phone]     Vitamin B-1 100 MG Tabs                Primary Care Provider Office Phone # Fax #    Thomas Dill [phone]        [facility] [address] [facility] [address]     [facility] [address]        Thank you!     Thank you for choosing [facility]  for your care. Our goal is always to provide you with excellent care. Hearing back from our patients is one way we can continue to improve our services. Please take a few minutes to complete the written survey that you may receive in the mail after your visit with us. Thank you!             Your Updated Medication List - Protect others around you: Learn how to safely use, store and throw away your medicines at www.disposemymeds.org.          This list is accurate as of: 3/16/17 11:59 PM.  Always use your most recent med list.                   Brand Name Dispense Instructions for use    acetaminophen 325 MG tablet    TYLENOL    100 tablet    Take 2 tablets (650 mg) by mouth every 6 hours       allopurinol 100 MG tablet    ZYLOPRIM    90 tablet    Take 1 tablet (100 mg) by mouth daily       ascorbic acid 500 MG tablet    VITAMIN C    90 tablet    Take 1 tablet (500 mg) by mouth daily With iron pill       atorvastatin 10 MG tablet    LIPITOR    30 tablet    Take 1 tablet (10 mg) by mouth daily       azelastine 0.05 % Soln ophthalmic solution    OPTIVAR    1 Bottle    Apply 1 drop to eye 2 times daily       bisacodyl 5 MG EC tablet    DULCOLAX    20 tablet    Take 1 tablet (5 mg) by mouth daily as needed for  constipation       blood glucose monitoring lancets     1 Box    Use to test blood sugars 2 times daily or as directed.       blood glucose monitoring test strip    no brand specified    1 Box    Use to test blood sugar 2 times daily or as directed.       calcium carbonate-vitamin D 500-400 MG-UNIT Tabs per tablet     180 tablet    Take 1 tablet by mouth 2 times daily       carboxymethylcellulose 0.5 % Soln ophthalmic solution    REFRESH PLUS    30 mL    Place 1 drop into the right eye 3 times daily as needed for other (eye irritation or discomfort.)       ferrous sulfate 325 (65 FE) MG tablet    IRON    90 tablet    Take 1 tablet (325 mg) by mouth daily (with breakfast)       guaiFENesin-dextromethorphan 100-10 MG/5ML syrup    ROBITUSSIN DM    560 mL    Take 5 mLs by mouth every 4 hours as needed for cough       levETIRAcetam 750 MG tablet    KEPPRA    180 tablet    Take 1 tablet (750 mg) by mouth 2 times daily       loratadine 10 MG tablet    CLARITIN    90 tablet    Take 1 tablet (10 mg) by mouth daily       losartan 25 MG tablet    COZAAR    45 tablet    Take 1 tablet (25 mg) by mouth daily       magnesium hydroxide 400 MG/5ML suspension    MILK OF MAGNESIA    473 mL    Take 30 mLs by mouth daily as needed for heartburn       melatonin 3 MG tablet      Take 1 tablet (3 mg) by mouth At Bedtime       * metoprolol 25 MG 24 hr tablet    TOPROL-XL    90 tablet    Take 1 tablet (25 mg) by mouth every evening       * metoprolol 50 MG 24 hr tablet    TOPROL-XL    90 tablet    Take 1 tablet (50 mg) by mouth daily       nitroglycerin 0.4 MG sublingual tablet    NITROSTAT    30 tablet    Place 1 tablet (0.4 mg) under the tongue every 5 minutes as needed for chest pain       nystatin 389464 UNIT/GM Powd    MYCOSTATIN    60 g    Apply to affected areas of skin in the groin and on the scrotum three times a day as needed.       olopatadine 0.1 % ophthalmic solution    PATANOL    1 Bottle    Place 1 drop into both eyes 2 times  daily       * order for DME     1 Device    Equipment being ordered: Lift chair.  Please see indications and face-to-face encounter details in 2/3/15 Office Visit note.       * order for DME     2 each    Equipment being ordered: Bilateral leg supports for manual wheelchair.       * order for DME     1 each    Equipment being ordered: Reclining manual w/c with bilateral elevating leg rests.       * order for DME     1 each    Equipment being ordered: Hospital Bed, fully electric.       * order for DME     1 each    Equipment being ordered: Wheelchair, manual.       * order for DME     1 each    Equipment being ordered: Wheelchair, manual, with elevated leg rests and tilt in space back.  Please fax to [employer]; I called them 11/26/16 and they requested the new order.  Face to face is in my 10/26/16 note.       * order for DME     2 each    Equipment being ordered: Cushioned heel boots.       oxyCODONE 5 MG IR tablet    ROXICODONE    90 tablet    Take 1 tablet (5 mg) by mouth every 4 hours as needed for moderate to severe pain       pantoprazole 40 MG EC tablet    PROTONIX    180 tablet    Take 1 tablet (40 mg) by mouth daily       senna-docusate 8.6-50 MG per tablet    SENOKOT-S;PERICOLACE    60 tablet    Take 1-2 tablets by mouth 2 times daily as needed for constipation       simethicone 80 MG chewable tablet    MYLICON    180 tablet    Take 1 tablet (80 mg) by mouth 4 times daily       tamsulosin 0.4 MG capsule    FLOMAX    90 capsule    Take 1 capsule (0.4 mg) by mouth daily       Vitamin B-1 100 MG Tabs     90 tablet    TAKE 1 TABLET (100 MG) BY MOUTH DAILY       warfarin 3 MG tablet    COUMADIN    180 tablet    Take 3mg by mouth on Mondays and Thursdays. Take 4.5mg by mouth all other days of the week.       * Notice:  This list has 9 medication(s) that are the same as other medications prescribed for you. Read the directions carefully, and ask your doctor or other care provider to review them with you.

## 2017-03-16 NOTE — Clinical Note
I made referrals to Ophthalmology and [facility] please.  Daughters may need help scheduling these.  Please keep the 4/11 follow-up.

## 2017-03-16 NOTE — PROGRESS NOTES
SUBJECTIVE:                                                      Sohan Rendon is a 66 year old male who has an LVAD due to severe ischemic cardiomyopathy. He has had recurrent embolic strokes, despite careful anticoagulation, with the LVAD believed to be the source. He was hospitalized 9/23 - 10/4/14 for acute ischemic subcortical stroke, superimposed upon previous R PICA and R MCA strokes. During that hospital stay, several discussions were carried out with the patient's family regarding the possibility of LVAD removal and conversion to Hospice. Per my discussion with his attending cardiologist, Dr. Lemons, family are well aware of the patient's terminal condition, but they steadfastly decline enrolling him into Hospice. They have wished to continue LVAD and other cares at home, while ensuring his comfort. Dr. Lemons made a referral for this patient to be seen at home by the [facility] to provide primary care management, though the LVAD/Cardiology clinics will remain in charge of his cardiology care.       Mr. Rendon was seen today in his home along with a daughter and an  for the above issues, in follow-up of a recent hospital stay, as well as for the following health issues:         Heart Failure Follow-up    Patient with end-stage cardiomyopathy, Stage D, [facility] IIIB. LVAD in place. History of recurrent emboli from LVAD despite careful anticoagulation. Patient is not a candidate for device revision or exchange per Cardiology notes.    Symptoms: No recent recurrence of vague left anterior chest pain that can persist for days at a time, and which he and I have previously attributed to muscular strain.    Shortness of breath: Happens at rest infrequently, but not worse.    Lower extremity edema: No more than trace pedal edema at baseline, currently stable.    Chest pain: See above.    Using more pillows than normal: N/A; has adjustable (hospital) bed.    Cough at night: Yes- occasional cough,  "present for months, addressed separately in this note.    Weight: Not checking weight daily; patient unable to weight bear.    Cardiology visits, ER/UC, or hospital admissions since last visit: Yes; ED visit yesterday for self-limited epistaxis.     Medication side effects: None described to cardiac meds.          Cerebrovascular Follow-up    History of multiple embolic strokes, LVAD source, despite warfarin anticoagulation. Most recent documented stroke September 2014.    Residual symptoms: Dysarthria, dysphagia, left arm and leg weakness. Dysarthria has improved remarkably. Dense left hemiparesis is unimproved. Dysphagia has improved to permit PO feeding of an unrestricted texture diet.  Diagnosed with new-onset seizure disorder 6/2015; see below.    Worsened or new symptoms since last visit: None.    Daily aspirin use: Stopped during 9/2016 hospital stay for gross hematuria and remains on hold.  At Urology recommendation, ASA is on hold until hematuria confirmed resolved.  Warfarin has continued, though inpatient Urology notes suggest a more conservative INR target of 1.8 to 2.3 instead of the prior Cardiology target of 2.5 to 3.0 (indication: history of recurrent stroke associated with LVAD).  I sent messages to Urology and Cardiology to see if these restrictions still apply, and heard back from Dr. Lemons, Cardiology.  Dr. Lemons has advised that we should continue to hold ASA, and continue to aim for INR 1.8 to 2.3, until further notice.          Seizures    Duration: Initially diagnosed during hospitalization of 6/2015. Was noted to have seizure in-house, levetiracetam started.    Description (location/character/radiation): Described as \"focal onset epilepsy\" in Neuro consultation.    Intensity: No witnessed seizure activity since return home from 6/2015 admission.    History (similar episodes/previous evaluation): Neuro consultation during hospital stay 6/2015. No neuro follow-up scheduled.    Precipitating " "or alleviating factors: History of multiple embolic CVA due to LVAD thrombosis, most recently 9/2014.    Therapies tried and outcome: Levetiracetam 500 mg BID started 6/2015, no witnessed seizure activity since Rx started.                                                  Cough        Duration: At least one year, though frequency has varied over time.    Description (location/character/radiation): Episodic cough, occurs both day and night, sometimes interferes with sleep.  Productive of small to scant amounts of sputum that is either clear or pale yellow.  No hemoptysis.  No associated dyspnea, respiratory chest pain, or wheeze.  I have never noted any abnormal findings on lung exam even when cough present.    Intensity:  Much improved since switch from lisinopril to losartan 10/2016. Described as \"OK\" today per patient.     Accompanying signs and symptoms: As described above.  Patient also has chronic nasal congestion, occasional clear discharge, as well as sensation of post-nasal gtts.    History (similar episodes/previous evaluation): Patient denies previous ENT evaluation.  Daughters requested ENT referral, which I made, but they never scheduled an appointment.  He has no underlying history of pulmonary disease.  Multiple CT head, most recently 5/23/16, have shown no sinus disease.    Precipitating or alleviating factors: Unclear.  Differential includes chronic rhinitis, due to subtle dysphagia, and ACE-I cough.    Therapies tried and outcome: Have tried multiple antihistamines, most recently loratadine and cetirizine, which haven't helped.  Fluticasone had to be stopped due to epistaxis. Patient requested ENT referral rather than observation or additional empiric therapy.  I made that referral, and patient's daughters were  Contacted by scheduling, but no appointment has been made. At our visit late 10/2016, with Dr. Lemons's approval, I stopped lisinopril and started losartan. With that change, patient reports " cough frequency and severity to have improved, and is no longer bothersome.                                      Amount of exercise or physical activity: None; essentially bed bound. Family says he can stand for brief periods of time if supported, cannot walk. There is a Jay lift at home used for transfer to chair or wheelchair. Family requested replacement manual wheelchair that will permit positioning of lower extremities given right leg fracture which I ordered 3/2016, and which has finally arrived. Current hospital bed controls have failed, new Rx for hospital bed sent to [facility] on 8/19/16, no word yet on delivery per daughter.    Problems taking medications regularly: Has some mild dysphagia, but is not missing doses. Meds administered by daughters.    Medication side effects: Lethargic occasionally following oxycodone doses, though has been using very little recently.    Diet: regular (no restrictions). Daughters feed him. No witnessed choking or aspiration.      Problem list and histories reviewed & adjusted, as indicated.  Additional history: as documented    BP Readings from Last 3 Encounters:   03/16/17 98/58   03/15/17 102/85   02/23/17 96/60    Wt Readings from Last 3 Encounters:   03/15/17 180 lb (81.6 kg)   02/21/17 182 lb (82.6 kg)   02/11/17 98 lb 8 oz (44.7 kg)                  Labs reviewed in EPIC    Reviewed and updated as needed this visit by clinical staff       Reviewed and updated as needed this visit by Provider       ROS:  Obtained both from patient's daughter, as well as the patient himself via .   CONSTITUTIONAL: NEGATIVE for chills, fever, sweats.   EYES: Reports itching and pain from OD, but no change in acuity from that eye.  Requests Ophthalmology referral.  OS without symptoms.  ENT/MOUTH: NEGATIVE for nasal congestion, sinus pressure.  Had recurrence of epistaxis yesterday prompting ED visit.  Bleeding stopped without intervention.  INR was 2.0 at the time.  RESP:  NEGATIVE for dyspnea, wheeze, or respiratory chest pain. See above regarding cough.  CV: NEGATIVE for chest pain, orthopnea, or worsened lower extremity edema.   GI: NEGATIVE for abdominal pain, diarrhea, nausea or vomiting. No melena or hematochezia noted. Constipation well-controlled on Senna 1 or 2 per day.  : NEGATIVE for hematuria, dysuria, or flank pain.   MUSCULOSKELETAL: NEGATIVE for change or worsening in right hip pain.  Reports recurrence of neck stiffness and occipital head pain, not a new symptom.  Currently using only rare oxycodone.    INTEG: Previous intertriginous rash in groin has improved. No rashes.  NEURO: NEGATIVE for new or worsened focal numbness or weakness or syncope.    PSYCHIATRIC: NEGATIVE for changes in mild baseline anxiety, no panic, no recent change in mood.    OBJECTIVE:                                                    BP 98/58  Pulse 60  Resp 14  SpO2 100% on room air.  There is no height or weight on file to calculate BMI.    GENERAL: Appears clean and comfortable, seated in lift chair.  EYES: Eyes grossly normal to inspection, no conjunctivitis or discharge.  HENT: Nares patent by sniff, no discharge, no evidence of recent bleeding aside from a few dried drops of blood on t-shirt.    NECK: Trachea midline, no stridor.    RESP: No accessory muscle use. Coughed once during this visit, sputum is scant, light yellow without blood.  Symmetrical breath sounds. Lungs clear anteriorly on inspiration and expiration. Expiration not prolonged, no wheeze.  CV: Regular rate and rhythm, non-tachycardic. Prominent LVAD noise, which sounds like someone is running a vacuum  in the near background, makes exam difficult. S1 S2 softly audible, no murmur or extra sound. No lower extremity edema. Extremities cool x4.  ABDOMEN: LVAD entry site not undressed for exam.  Soft, non-tender, no guarding over all quadrants. Liver and spleen not palpable, no masses palpable. Bowel sounds  positive.  MS: No bony deformities noted, including at fractured right femur. No pain reproduced by passive ROM of either hip or leg. No red or inflamed joints.  Pain reproducible by palpation of right paraspinous neck muscles and nuchal ridge.  SKIN: Warm and dry, no rashes where skin visible.    NEURO: Alert and conversant, oriented and appropriate in conversation per , though some very mild dysarthria present. Mild left facial droop noted, cranial nerves II - XII otherwise grossly intact.  Dense left upper and lower extremity paresis persists.  PSYCH: Calm, conversant. Language barrier prevents good exam, though affect appears normal.    Diagnostic Test Results:  Results for orders placed or performed during the hospital encounter of 03/15/17   CBC with platelets differential   Result Value Ref Range    WBC 6.1 4.0 - 11.0 10e9/L    RBC Count 4.52 4.4 - 5.9 10e12/L    Hemoglobin 14.4 13.3 - 17.7 g/dL    Hematocrit 43.2 40.0 - 53.0 %    MCV 96 78 - 100 fl    MCH 31.9 26.5 - 33.0 pg    MCHC 33.3 31.5 - 36.5 g/dL    RDW 17.1 (H) 10.0 - 15.0 %    Platelet Count 136 (L) 150 - 450 10e9/L    Diff Method Automated Method     % Neutrophils 59.2 %    % Lymphocytes 17.4 %    % Monocytes 13.0 %    % Eosinophils 8.6 %    % Basophils 1.0 %    % Immature Granulocytes 0.8 %    Nucleated RBCs 0 0 /100    Absolute Neutrophil 3.6 1.6 - 8.3 10e9/L    Absolute Lymphocytes 1.1 0.8 - 5.3 10e9/L    Absolute Monocytes 0.8 0.0 - 1.3 10e9/L    Absolute Eosinophils 0.5 0.0 - 0.7 10e9/L    Absolute Basophils 0.1 0.0 - 0.2 10e9/L    Abs Immature Granulocytes 0.1 0 - 0.4 10e9/L    Absolute Nucleated RBC 0.0    INR   Result Value Ref Range    INR 2.07 (H) 0.86 - 1.14   Comprehensive metabolic panel   Result Value Ref Range    Sodium 141 133 - 144 mmol/L    Potassium 4.0 3.4 - 5.3 mmol/L    Chloride 109 94 - 109 mmol/L    Carbon Dioxide 23 20 - 32 mmol/L    Anion Gap 9 3 - 14 mmol/L    Glucose 134 (H) 70 - 99 mg/dL    Urea Nitrogen 23  "7 - 30 mg/dL    Creatinine 1.82 (H) 0.66 - 1.25 mg/dL    GFR Estimate 37 (L) >60 mL/min/1.7m2    GFR Estimate If Black 45 (L) >60 mL/min/1.7m2    Calcium 8.8 8.5 - 10.1 mg/dL    Bilirubin Total 0.9 0.2 - 1.3 mg/dL    Albumin 3.5 3.4 - 5.0 g/dL    Protein Total 7.8 6.8 - 8.8 g/dL    Alkaline Phosphatase 110 40 - 150 U/L    ALT 20 0 - 70 U/L    AST  0 - 45 U/L     Unsatisfactory specimen - hemolyzed   Unsatisfactory specimen - hemolyzed  NOTIFIED COREY FOLEY RN IN ER AT 0943 ON 3/15/17 BY RENETTA       *Note: Due to a large number of results and/or encounters for the requested time period, some results have not been displayed. A complete set of results can be found in Results Review.        ASSESSMENT/PLAN:                                                      (M54.2) Neck pain  (primary encounter diagnosis)  Comment: Has had reproducible pain on palpation of left neck paraspinous muscles and the nuchal ridge for over a year.  I ordered PT eval/treat in the past, but it never got scheduled.  Plan: Referral for PT eval and treat.      (H57.8) Irritation of left eye  Comment: Patient has history of (H26.9) Cataract, right and (Z96.1) Pseudophakia right eye, S/P cataract surgery 11/2015 by Dr. Cosme.  Has reported that OD \"itches\" and sometimes burns beginning several months after the operation, and reports recurrence of these symptoms today without change in OD visual acuity.  Requests Ophthalmology referral.  Plan: Referral made to Ophthalmology.    (R05) Chronic cough    Comment: Present for at least a year, though subjectively bothersome recently.  He has no history of lung disease, I have never noted abnormal findings on lung exam, and previous chest imaging has been normal.  Cough is associated with prominent nasal symptoms.  CT imaging as recently as 5/2016 showed no sinus abnormalities.  Nasal steroid therapy had to be stopped due to epistaxis.  Empiric therapy with a couple different antihistamines was ineffective.  " "Patient requested ENT referral rather than continued empiric Rx, which I made, though it does not appear that his daughters scheduled that visit. At 10/2016 visit, with Dr. Lemons's approval, we stopped lisinopril and started losartan. Cough has improved with that change, is no longer bothersome (\"it's OK\", per patient).  Plan: Will continue losartan, observe for cough recurrence.      (J30.9) Allergic rhinitis, unspecified allergic rhinitis type  Comment: Persistent symptoms.  Plan: Had to stop fluticasone (FLONASE) 50 MCG/ACT nasal spray due to epistaxis.  Continue loratadine, observe.      (I50.22) CHF (congestive heart failure), NYHA class IV, (Z95.811) LVAD (left ventricular assist device) present  Comment: Patient has recurrent embolic events from his LVAD, including recurrent stroke, despite careful anticoagulation. His LVAD cannot be exchanged, per my conversation with Dr. Lemons, Cardiology. Heart failure overall appears to be well-compensated today. VAD interrogated during recent cardiology visit and hospital stay, no abnormalities found.  No medication changes noted since my last visit with him.  Plan: Continue current care, though focusing on palliative care. He continues on warfarin as directed by VAD Clinic. Continue to defer management of rhythm, hemodynamics, anticoagulation to the Cardiology/LVAD Clinic.  Previous INR target was 2.5 to 3.0 per Cardiology, but a lower target INR of 1.8 to 2.3 is currently being used due to recurrent gross hematuria.  ASA was on hold at Cardiology advice due to hematuria.  These INR and ASA modifications are to continue until further notice, per message from Cardiology.       (G47.01) Insomnia due to medical condition    Comment: Episodic sleep-initiation difficulties, not severe.  He is using acetaminophen/diphenhydramine and melatonin OTC with some relief.  Plan: Continue \"Tylenol PM\" (acetaminophen/diphenhydramine) and melatonin 3 mg at HS PRN.      (R56.9) Focal " onset epilepsy  Comment: Seizure at home 6/2015 prompted hospitalization, patient now on levetiracetam. No additional seizure activity noted since Rx started.   Plan: Continue levetiracetam 500 mg Q12H, observe for seizure recurrence.       (S72.001K) Fracture of femoral neck, right, closed, subsequent encounter  Comment: S/P hospitalization 1/2015. Family declined surgery, pain control now the primary goal. Patient seen most recently in Sports Med clinic on 4/7/15 by Dr. Taylor. No specific therapy recommended, and no orthopedics follow-up necessary.  Seen in ED 2/2016 due to recurrent hip pain after PT, imaging showed no change, symptoms resolved without change in Rx.  Plan: Patient and daughter report generally good pain control on oxycodone IR 5 mg Q6H PRN, which he is now using rarely. PT was helpful at improving patient's tolerance of PROM of hip, and reducing pain and muscle cramping, but is completed now.  Could resume PT if needed for pain, immobility in future.      (I63.411) Cerebrovascular accident 9/2014 due to embolism of R MCA  Comment: Residual deficits include dysarthria, which has improved remarkably, dysphagia (see below), dense left hemiparesis, and new-onset epilepsy as of 6/2015. Given that source of embolic strokes is from LVAD, and that embolism cannot be prevented despite anticoagulation, future events are possible or likely. He had what appears to have been a TIA during 4/2016 hospital stay when warfarin and ASA were held due to gross hematuria, but subsequent neuro workup did not reveal new stroke, and daughters report stable neuro status since return home.  Plan: Warfarin anticoagulation continues per Cardiology, ASA held due to hematuria, details above.  Observe for new events.      (R13.10) Dysphagia  Comment: Had bedside swallow evaluation from Speech Therapy 12/2014. He had timbo aspiration only to thin liquids. Per daughters, although they have to feed him due to weakness from  stroke, he appears to swallow without choking or aspiration episodes.  Plan: Continue speech therapy recommendations: patient should be sitting straight up when eating, take small bolus sizes, eat slowly, not talk during eating. Continue PO diet.     (K21.0) Gastroesophageal reflux disease with esophagitis  Comment: Symptoms generally well-controlled, and anemia resolved, on pantoprazole 40 mg QD. Today e reports occasional breakthrough reflux symptoms in the evening despite pantoprazole.  Plan: Advised use of magnesium hydroxide (MILK OF MAGNESIA) 400 MG/5ML suspension 30 cc PRN, and advised patient and daughter of the side effect of diarrhea. They'll give it a try.    CONSULTATION/REFERRAL to [facility], and Ophthalmology.    FUTURE APPOINTMENTS:       - Follow-up visit scheduled for 4/11/17 at 1400.    Face to face time was 40 minutes, at least 50% of which was spent in counseling regarding eye irritation, neck and posterior head pain, and reviewing medication management along with Joann BeckerD who also participated in this visit.      Thomas Dill MD  [facility]

## 2017-03-17 ENCOUNTER — CARE COORDINATION (OUTPATIENT)
Dept: GERIATRIC MEDICINE | Facility: CLINIC | Age: 67
End: 2017-03-17

## 2017-03-17 NOTE — PROGRESS NOTES
CM received a call from Zari Beasley ([phone]) regarding transfer of client's CADI waiver to [facility]. She states when she got the referral she was not aware client had a CADI waiver so she has notified her supervisor to transfer client to a CADI CM. She will call this CM back once a new CADI CM has been assigned.   Nena Salazar RN  [facility]   [phone]

## 2017-03-23 ENCOUNTER — ANTICOAGULATION THERAPY VISIT (OUTPATIENT)
Dept: ANTICOAGULATION | Facility: CLINIC | Age: 67
End: 2017-03-23

## 2017-03-23 ENCOUNTER — TELEPHONE (OUTPATIENT)
Dept: FAMILY MEDICINE | Facility: CLINIC | Age: 67
End: 2017-03-23

## 2017-03-23 DIAGNOSIS — Z95.811 LVAD (LEFT VENTRICULAR ASSIST DEVICE) PRESENT (H): ICD-10-CM

## 2017-03-23 DIAGNOSIS — Z79.01 LONG-TERM (CURRENT) USE OF ANTICOAGULANTS: ICD-10-CM

## 2017-03-23 LAB — INR PPP: 2.1

## 2017-03-23 NOTE — PROGRESS NOTES
ANTICOAGULATION FOLLOW-UP CLINIC VISIT    Patient Name:  Sohan Rendon  Date:  3/23/2017  Contact Type:  Telephone    SUBJECTIVE:     Patient Findings     Positives No Problem Findings           OBJECTIVE    INR   Date Value Ref Range Status   03/23/2017 2.10  Final     Comment:     Home Care      Chromogenic Factor 10   Date Value Ref Range Status   08/10/2014 24 (L) 70 - 130 % Final     Comment:     Therapeutic Range:  A Chromogenic Factor 10 level of approximately 20-40%   inversely correlates with an INR of 2-3 for patients receiving Warfarin.   Chromogenic Factor 10 levels below 20% indicate an INR greater than 3 and   levels above 40% indicate an INR less than 2.       Factor 2 Assay   Date Value Ref Range Status   07/21/2014 25 (L) 60 - 140 % Final     Comment:     Analyte Specific Reagents (ASRs) are used in many laboratory tests necessary   for   standard medical care and generally do not require FDA approval.  This test   was   developed and its performance characteristics determined by [facility].  It has not been cleared or approved by   the [place] Food and Drug Administration.         ASSESSMENT / PLAN  INR assessment THER    Recheck INR In: 1 WEEK    INR Location [facility] INR      Anticoagulation Summary as of 3/23/2017     INR goal    Prior goal 1.8-2.5   Today's INR 2.10   Maintenance plan 4.5 mg (3 mg x 1.5) on Sun, Tue, Thu; 3 mg (3 mg x 1) all other days   Full instructions 4.5 mg on Sun, Tue, Thu; 3 mg all other days   Weekly total 25.5 mg   Plan last modified Blanca Bah RN (2/27/2017)   Next INR check 3/30/2017   Priority INR   Target end date Indefinite    Indications   LVAD (left ventricular assist device) present [Z95.811]  Long-term (current) use of anticoagulants [Z79.01] [Z79.01]         Anticoagulation Episode Summary     INR check location     Preferred lab     Send INR reminders to [facility]    Comments Goal Range is  1.8-2.3  [facility] comes out to draw INR  Daughter Almaz Ph [phone]      Anticoagulation Care Providers     Provider Role Specialty Phone number    Evon Lemons MD Responsible Cardiology [phone]            See the Encounter Report to view Anticoagulation Flowsheet and Dosing Calendar (Go to Encounters tab in chart review, and find the Anticoagulation Therapy Visit)    Spoke with INO Gregory RN

## 2017-03-23 NOTE — TELEPHONE ENCOUNTER
Dalila had called from [facility] requesting orders. Requesting PT orders 1w1 to est ROM.     Dalila can be reached for verbal orders.

## 2017-03-23 NOTE — TELEPHONE ENCOUNTER
Left voicemail for [place], PT [facility] and gave verbal ok on order requested per RN protocol.    Concetta Burr RN

## 2017-03-23 NOTE — MR AVS SNAPSHOT
[facility]   3/23/2017   Anticoagulation Therapy Visit    Description:  67 year old male   Provider:  Sarah Martinez, RN   Department:  [facility]           INR as of 3/23/2017     Today's INR 2.10      Anticoagulation Summary as of 3/23/2017     INR goal    Prior goal 1.8-2.5   Today's INR 2.10   Full instructions 4.5 mg on Sun, Tue, Thu; 3 mg all other days   Next INR check 3/30/2017    Indications   LVAD (left ventricular assist device) present [Z95.811]  Long-term (current) use of anticoagulants [Z79.01] [Z79.01]         March 2017 Details    Sun Mon Tue Wed Thu Fri Sat        1               2               3               4                 5               6               7               8               9               10               11                 12               13               14               15               16               17               18                 19               20               21               22               23      4.5 mg   See details      24      3 mg         25      3 mg           26      4.5 mg         27      3 mg         28      4.5 mg         29      3 mg         30            31                 Date Details   03/23 This INR check       Date of next INR:  3/30/2017         How to take your warfarin dose     To take:  3 mg Take 1 of the 3 mg tablets.    To take:  4.5 mg Take 1.5 of the 3 mg tablets.

## 2017-03-27 ENCOUNTER — ANTICOAGULATION THERAPY VISIT (OUTPATIENT)
Dept: ANTICOAGULATION | Facility: CLINIC | Age: 67
End: 2017-03-27

## 2017-03-27 ENCOUNTER — APPOINTMENT (OUTPATIENT)
Dept: GENERAL RADIOLOGY | Facility: CLINIC | Age: 67
End: 2017-03-27
Attending: EMERGENCY MEDICINE
Payer: MEDICARE

## 2017-03-27 ENCOUNTER — HOSPITAL ENCOUNTER (EMERGENCY)
Facility: CLINIC | Age: 67
Discharge: HOME | End: 2017-03-27
Attending: EMERGENCY MEDICINE | Admitting: EMERGENCY MEDICINE
Payer: MEDICARE

## 2017-03-27 VITALS
SYSTOLIC BLOOD PRESSURE: 105 MMHG | OXYGEN SATURATION: 100 % | DIASTOLIC BLOOD PRESSURE: 87 MMHG | RESPIRATION RATE: 12 BRPM | TEMPERATURE: 97.8 F | HEART RATE: 67 BPM

## 2017-03-27 DIAGNOSIS — Z95.811 LVAD (LEFT VENTRICULAR ASSIST DEVICE) PRESENT (H): ICD-10-CM

## 2017-03-27 DIAGNOSIS — K21.00 GASTROESOPHAGEAL REFLUX DISEASE WITH ESOPHAGITIS: ICD-10-CM

## 2017-03-27 DIAGNOSIS — Z95.810 AUTOMATIC IMPLANTABLE CARDIOVERTER-DEFIBRILLATOR IN SITU: ICD-10-CM

## 2017-03-27 DIAGNOSIS — K59.09 OTHER CONSTIPATION: ICD-10-CM

## 2017-03-27 DIAGNOSIS — Z79.01 LONG-TERM (CURRENT) USE OF ANTICOAGULANTS: ICD-10-CM

## 2017-03-27 DIAGNOSIS — I50.22 CHF (CONGESTIVE HEART FAILURE), NYHA CLASS IV, CHRONIC, SYSTOLIC (H): ICD-10-CM

## 2017-03-27 DIAGNOSIS — K59.00 CONSTIPATION, UNSPECIFIED CONSTIPATION TYPE: ICD-10-CM

## 2017-03-27 LAB
ALBUMIN SERPL-MCNC: 3.6 G/DL (ref 3.4–5)
ALP SERPL-CCNC: 117 U/L (ref 40–150)
ALT SERPL W P-5'-P-CCNC: 24 U/L (ref 0–70)
ANION GAP SERPL CALCULATED.3IONS-SCNC: 8 MMOL/L (ref 3–14)
AST SERPL W P-5'-P-CCNC: 58 U/L (ref 0–45)
BASOPHILS # BLD AUTO: 0 10E9/L (ref 0–0.2)
BASOPHILS NFR BLD AUTO: 0.7 %
BILIRUB SERPL-MCNC: 0.8 MG/DL (ref 0.2–1.3)
BUN SERPL-MCNC: 22 MG/DL (ref 7–30)
CALCIUM SERPL-MCNC: 8.5 MG/DL (ref 8.5–10.1)
CHLORIDE SERPL-SCNC: 108 MMOL/L (ref 94–109)
CO2 SERPL-SCNC: 25 MMOL/L (ref 20–32)
CREAT SERPL-MCNC: 1.72 MG/DL (ref 0.66–1.25)
DIFFERENTIAL METHOD BLD: ABNORMAL
EOSINOPHIL # BLD AUTO: 0.4 10E9/L (ref 0–0.7)
EOSINOPHIL NFR BLD AUTO: 5.9 %
ERYTHROCYTE [DISTWIDTH] IN BLOOD BY AUTOMATED COUNT: 17 % (ref 10–15)
GFR SERPL CREATININE-BSD FRML MDRD: 40 ML/MIN/1.7M2
GLUCOSE SERPL-MCNC: 172 MG/DL (ref 70–99)
HCT VFR BLD AUTO: 42.7 % (ref 40–53)
HGB BLD-MCNC: 14.3 G/DL (ref 13.3–17.7)
IMM GRANULOCYTES # BLD: 0.1 10E9/L (ref 0–0.4)
IMM GRANULOCYTES NFR BLD: 1.6 %
INR PPP: 2 (ref 0.86–1.14)
INR PPP: 2.1
LIPASE SERPL-CCNC: 106 U/L (ref 73–393)
LYMPHOCYTES # BLD AUTO: 1 10E9/L (ref 0.8–5.3)
LYMPHOCYTES NFR BLD AUTO: 16.6 %
MCH RBC QN AUTO: 31.6 PG (ref 26.5–33)
MCHC RBC AUTO-ENTMCNC: 33.5 G/DL (ref 31.5–36.5)
MCV RBC AUTO: 94 FL (ref 78–100)
MONOCYTES # BLD AUTO: 0.5 10E9/L (ref 0–1.3)
MONOCYTES NFR BLD AUTO: 8.8 %
NEUTROPHILS # BLD AUTO: 4.1 10E9/L (ref 1.6–8.3)
NEUTROPHILS NFR BLD AUTO: 66.4 %
NRBC # BLD AUTO: 0 10*3/UL
NRBC BLD AUTO-RTO: 0 /100
PLATELET # BLD AUTO: 160 10E9/L (ref 150–450)
POTASSIUM SERPL-SCNC: 3.7 MMOL/L (ref 3.4–5.3)
PROT SERPL-MCNC: 7.9 G/DL (ref 6.8–8.8)
RBC # BLD AUTO: 4.53 10E12/L (ref 4.4–5.9)
SODIUM SERPL-SCNC: 141 MMOL/L (ref 133–144)
WBC # BLD AUTO: 6.1 10E9/L (ref 4–11)

## 2017-03-27 PROCEDURE — 71010 XR CHEST 1 VW: CPT

## 2017-03-27 PROCEDURE — A9270 NON-COVERED ITEM OR SERVICE: HCPCS | Mod: GY | Performed by: EMERGENCY MEDICINE

## 2017-03-27 PROCEDURE — 83690 ASSAY OF LIPASE: CPT | Performed by: EMERGENCY MEDICINE

## 2017-03-27 PROCEDURE — 25000132 ZZH RX MED GY IP 250 OP 250 PS 637: Mod: GY | Performed by: EMERGENCY MEDICINE

## 2017-03-27 PROCEDURE — 25000125 ZZHC RX 250: Performed by: EMERGENCY MEDICINE

## 2017-03-27 PROCEDURE — 99284 EMERGENCY DEPT VISIT MOD MDM: CPT | Mod: 25 | Performed by: EMERGENCY MEDICINE

## 2017-03-27 PROCEDURE — 74020 XR ABDOMEN 2 VW: CPT

## 2017-03-27 PROCEDURE — 96374 THER/PROPH/DIAG INJ IV PUSH: CPT | Performed by: EMERGENCY MEDICINE

## 2017-03-27 PROCEDURE — 80053 COMPREHEN METABOLIC PANEL: CPT | Performed by: EMERGENCY MEDICINE

## 2017-03-27 PROCEDURE — 99284 EMERGENCY DEPT VISIT MOD MDM: CPT | Mod: Z6 | Performed by: EMERGENCY MEDICINE

## 2017-03-27 PROCEDURE — 25000128 H RX IP 250 OP 636: Performed by: EMERGENCY MEDICINE

## 2017-03-27 PROCEDURE — 85025 COMPLETE CBC W/AUTO DIFF WBC: CPT | Performed by: EMERGENCY MEDICINE

## 2017-03-27 PROCEDURE — 85610 PROTHROMBIN TIME: CPT | Performed by: EMERGENCY MEDICINE

## 2017-03-27 RX ORDER — ONDANSETRON 2 MG/ML
4 INJECTION INTRAMUSCULAR; INTRAVENOUS ONCE
Status: COMPLETED | OUTPATIENT
Start: 2017-03-27 | End: 2017-03-27

## 2017-03-27 RX ORDER — ONDANSETRON 2 MG/ML
INJECTION INTRAMUSCULAR; INTRAVENOUS
Status: DISCONTINUED
Start: 2017-03-27 | End: 2017-03-27 | Stop reason: HOSPADM

## 2017-03-27 RX ADMIN — ONDANSETRON 4 MG: 2 INJECTION INTRAMUSCULAR; INTRAVENOUS at 02:23

## 2017-03-27 RX ADMIN — LIDOCAINE HYDROCHLORIDE 30 ML: 20 SOLUTION ORAL; TOPICAL at 04:40

## 2017-03-27 ASSESSMENT — ENCOUNTER SYMPTOMS
VOMITING: 1
NAUSEA: 1
ABDOMINAL PAIN: 1
SHORTNESS OF BREATH: 0

## 2017-03-27 NOTE — MR AVS SNAPSHOT
[facility]   3/27/2017   Anticoagulation Therapy Visit    Description:  67 year old male   Provider:  Sarah Osborne, RN   Department:  [facility]           INR as of 3/27/2017     Today's INR 2.1      Anticoagulation Summary as of 3/27/2017     INR goal    Prior goal 1.8-2.5   Today's INR 2.1   Full instructions 4.5 mg on Sun, Tue, Thu; 3 mg all other days   Next INR check 4/3/2017    Indications   LVAD (left ventricular assist device) present [Z95.811]  Long-term (current) use of anticoagulants [Z79.01] [Z79.01]         March 2017 Details    Sun Mon Tue Wed Thu Fri Sat        1               2               3               4                 5               6               7               8               9               10               11                 12               13               14               15               16               17               18                 19               20               21               22               23               24               25                 26               27      3 mg   See details      28      4.5 mg         29      3 mg         30      4.5 mg         31      3 mg           Date Details   03/27 This INR check               How to take your warfarin dose     To take:  3 mg Take 1 of the 3 mg tablets.    To take:  4.5 mg Take 1.5 of the 3 mg tablets.           April 2017 Details    Sun Mon Tue Wed Thu Fri Sat           1      3 mg           2      4.5 mg         3            4               5               6               7               8                 9               10               11               12               13               14               15                 16               17               18               19               20               21               22                 23               24               25               26               27               28               29                 30                      Date  Details   No additional details    Date of next INR:  4/3/2017         How to take your warfarin dose     To take:  3 mg Take 1 of the 3 mg tablets.    To take:  4.5 mg Take 1.5 of the 3 mg tablets.

## 2017-03-27 NOTE — ED NOTES
Pt presents via EMS with c/o rt flank pain and vomiting x 1 hr PTA. Denies urinary sx.   Actively vomiting during triage.  Pt with constipation.  Took milk of magnesia prior to coming in.

## 2017-03-27 NOTE — PROGRESS NOTES
ANTICOAGULATION FOLLOW-UP CLINIC VISIT    Patient Name:  Sohan Rendon  Date:  3/27/2017  Contact Type:  Telephone    SUBJECTIVE:     Patient Findings     Positives Other complaints (seen in the ER today for abdominal pain, possible constipation)           OBJECTIVE    INR   Date Value Ref Range Status   03/27/2017 2.00 (H) 0.86 - 1.14 Final     Chromogenic Factor 10   Date Value Ref Range Status   08/10/2014 24 (L) 70 - 130 % Final     Comment:     Therapeutic Range:  A Chromogenic Factor 10 level of approximately 20-40%   inversely correlates with an INR of 2-3 for patients receiving Warfarin.   Chromogenic Factor 10 levels below 20% indicate an INR greater than 3 and   levels above 40% indicate an INR less than 2.       Factor 2 Assay   Date Value Ref Range Status   07/21/2014 25 (L) 60 - 140 % Final     Comment:     Analyte Specific Reagents (ASRs) are used in many laboratory tests necessary   for   standard medical care and generally do not require FDA approval.  This test   was   developed and its performance characteristics determined by [facility].  It has not been cleared or approved by   the US Food and Drug Administration.         ASSESSMENT / PLAN  INR assessment THER    Recheck INR In: 1 WEEK    INR Location [facility]      Anticoagulation Summary as of 3/27/2017     INR goal    Prior goal 1.8-2.5   Today's INR 2.1   Maintenance plan 4.5 mg (3 mg x 1.5) on Sun, Tue, Thu; 3 mg (3 mg x 1) all other days   Full instructions 4.5 mg on Sun, Tue, Thu; 3 mg all other days   Weekly total 25.5 mg   Plan last modified Blanca Bah RN (2/27/2017)   Next INR check 4/3/2017   Priority INR   Target end date Indefinite    Indications   LVAD (left ventricular assist device) present [Z95.811]  Long-term (current) use of anticoagulants [Z79.01] [Z79.01]         Anticoagulation Episode Summary     INR check location     Preferred lab     Send INR reminders to [facility]    Comments Goal Range is 1.8-2.3  [facility] comes out to draw INR  Daughter Almaz Ph [phone]      Anticoagulation Care Providers     Provider Role Specialty Phone number    Evon Lemons MD Responsible Cardiology [phone]            See the Encounter Report to view Anticoagulation Flowsheet and Dosing Calendar (Go to Encounters tab in chart review, and find the Anticoagulation Therapy Visit)    Spoke to Sofya MCKINNON.  Sohan was seen in the ER today for abdominal pain, possibly from constipation.  He was discharged home.  Stool softener will be increased today.      Sarah Osborne RN

## 2017-03-27 NOTE — DISCHARGE INSTRUCTIONS
-Please make an appointment to follow up with Your Primary Care Provider in 1-2 days if not improving.  -As discussed in the recommend starting the senna as previously prescribed this morning and again in the evening if not producing bowel movement.  And then twice daily one tablet as needed for further constipation.  -Recommend monitoring for any return of or worsening pain, fevers, or nausea/vomiting that are uncontrolled and unable to keep any fluids down. If you are restarting oral intake back to normal diet recommend small amounts of food with increases to normal as tolerated.      Treating Constipation  Constipation is a common and often uncomfortable problem. Constipation means you have bowel movements fewer than three times per week, or strain to pass hard, dry stool. It can last a short time. Or it can be a problem that never seems to go away. The good news is that it can often be treated and controlled.    Eat more fiber  One of the best ways to help treat constipation is to increase your fiber intake. You can do this either through diet or by using fiber supplements. Fiber (in whole grains, fruits, and vegetables) adds bulk and absorbs water to soften the stool. This helps the stool pass through the colon more easily. When you increase your fiber intake, do it slowly to avoid side effects such as bloating. Also increase the amount of water that you drink. Eating more of the following foods can add fiber to your diet.    High-fiber cereals    Whole grains, bran, and brown rice    Vegetables such as carrots, broccoli, and greens    Fresh fruits (especially apples, pears, and dried fruits like raisins and apricots)    Nuts and legumes (especially beans such as lentils, kidney beans, and lima beans)  Get physically active  Exercise helps improve the working of your colon which helps ease constipation. Try to get some physical activity every day. If you haven t been active for a while, talk to your health care  provider before starting again.  Laxatives  Your health care provider may suggest an over-the-counter product to help ease your constipation. He or she may suggest the use of bulk-forming agents or laxatives. The use of laxatives, if used as directed, is common and safe. Follow directions carefully when using them. See your health care provider for new-onset constipation, to rule out other causes such as medications or thyroid disease.    8441-0607 [employer]. [address] [place] [address], [place] [address]. All rights reserved. This information is not intended as a substitute for professional medical care. Always follow your healthcare professional's instructions.

## 2017-03-27 NOTE — ED PROVIDER NOTES
History     Chief Complaint   Patient presents with     Flank Pain     The history is provided by the patient and a relative. The history is limited by a language barrier. A  was used.     Sohan Rendon is a 67 year old male with a history of congestive heart failure currently with LVAD, past right-sided MCA and left sided hemiparesis, dysphasia, esophagitis, GERD, and hypertension who presents ER for right-sided abdominal to rib pain with left-sided back pain.  Per patient he has been constipated today and has not had a bowel movement.  He was having some intermittent discomfort in the right upper quadrant when family tried to give him some milk of magnesia.  Shortly after that he began throwing up and family called 911.  He states still feeling nauseous but not as severe as when he arrived.  He reports no specific left-sided chest pain, no shortness breath, no lightheadedness.  He does report that he has the pain intermittently on his left back and intermittently on his left chest which are aching to sharp in nature.  Patient and family are concerned about the vomiting and constipation.  He states that he's been here for similar in the past and sometimes gets admitted sometimes gets to go home.    I have reviewed the Medications, Allergies, Past Medical and Surgical History, and Social History in the [employer] system.  Past Medical History:   Diagnosis Date     Acute right MCA stroke (H) 6/2013    With L sided hemiparesis      Anemia of chronic disease     Baseline Hb 8-9     BPH (benign prostatic hyperplasia)      CHF (congestive heart failure), NYHA class IV (H) 10/9/2012    s/p HeartMate II.  Was on milrinone and dobutamine prior to LVAD      CKD (chronic kidney disease)     Baseline Cr=     Clostridium difficile colitis 12/2012      Dysphagia     PEG tube placed in 2012.  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  Normal carotid duplex 3/2014.       Esophagitis 12/2012    EGD with esophagitis and multiple douenal ulcers     Fracture of femoral neck, right, closed 2/3/2015    Presumed pathologic as patient is non-weight-bearing and suffered no trauma.  Family declined operative repair during hospital stay 1/23 - 1/30/15.     Gastric and duodenal angiodysplasia with hemorrhage 6/18/2015     GERD (gastroesophageal reflux disease)      Gout      HTN (hypertension)     LDL=59 (3/29/2014)     Hyperlipaemia      Ischemic cardiomyopathy      Mitral regurgitation     s/p MVR with Carbomedics ring      Myocardial infarction (H) 1998    In [facility] without intervention      Nonsenile cataract     BE     PFO (patent foramen ovale)     s/p closure (10/9/2012)     TB lung, latent     s/p 9 months INH in 2012        Past Surgical History:   Procedure Laterality Date     C CABG, VEIN, FIVE  2001     CATARACT IOL, RT/LT Right 11/19/2015     ESOPHAGOSCOPY, GASTROSCOPY, DUODENOSCOPY (EGD), COMBINED N/A 6/10/2015    Procedure: COMBINED ESOPHAGOSCOPY, GASTROSCOPY, DUODENOSCOPY (EGD);  Surgeon: Edu Jenkins MD;  Location: [facility]     ESOPHAGOSCOPY, GASTROSCOPY, DUODENOSCOPY (EGD), COMBINED N/A 6/15/2015    Procedure: COMBINED ESOPHAGOSCOPY, GASTROSCOPY, DUODENOSCOPY (EGD);  Surgeon: Yury Bonner MD;  Location: [facility] OR     H INSERT ICD  2/10/2011    And pacemaker.  Not BiV     INSERT VENTRICULAR ASSIST DEVICE LEFT (HEARTMATE II)  10/9/2012     IR GASTRO JEJUNOSTOMY TUBE PLACEMENT       PHACOEMULSIFICATION CLEAR CORNEA WITH STANDARD INTRAOCULAR LENS IMPLANT Right 11/19/2015    Procedure: PHACOEMULSIFICATION CLEAR CORNEA WITH STANDARD INTRAOCULAR LENS IMPLANT;  Surgeon: Violeta Cosme MD;  Location: [facility] OR     REPAIR PATENT FORAMEN OVALE  10/9/2012     REPAIR VALVE MITRAL  2/9/2012    28 mm Carbomedics 2/3 ring        Family History   Problem Relation Age of Onset     Family History Negative No family hx of      Glaucoma  No family hx of      Macular Degeneration No family hx of      CANCER No family hx of      no skin cancer       Social History   Substance Use Topics     Smoking status: Former Smoker     Smokeless tobacco: Former User     Alcohol use No        Allergies   Allergen Reactions     Seasonal Allergies      Current Facility-Administered Medications   Medication     ondansetron (ZOFRAN) 2 MG/ML injection     Current Outpatient Prescriptions   Medication     melatonin 3 MG tablet     Thiamine HCl (VITAMIN B-1) 100 MG TABS     magnesium hydroxide (MILK OF MAGNESIA) 400 MG/5ML suspension     order for DME     guaiFENesin-dextromethorphan (ROBITUSSIN DM) 100-10 MG/5ML syrup     losartan (COZAAR) 25 MG tablet     allopurinol (ZYLOPRIM) 100 MG tablet     atorvastatin (LIPITOR) 10 MG tablet     order for DME     pantoprazole (PROTONIX) 40 MG enteric coated tablet     warfarin (COUMADIN) 3 MG tablet     tamsulosin (FLOMAX) 0.4 MG 24 hr capsule     senna-docusate (SENOKOT-S;PERICOLACE) 8.6-50 MG per tablet     order for DME     order for DME     azelastine (OPTIVAR) 0.05 % SOLN     loratadine (CLARITIN) 10 MG tablet     levETIRAcetam (KEPPRA) 750 MG tablet     ferrous sulfate (IRON) 325 (65 FE) MG tablet     ascorbic acid (VITAMIN C) 500 MG tablet     metoprolol (TOPROL-XL) 25 MG 24 hr tablet     metoprolol (TOPROL-XL) 50 MG 24 hr tablet     blood glucose monitoring (NO BRAND SPECIFIED) test strip     simethicone (MYLICON) 80 MG chewable tablet     order for DME     oxyCODONE (ROXICODONE) 5 MG immediate release tablet     nystatin (MYCOSTATIN) 865133 UNIT/GM POWD     carboxymethylcellulose (REFRESH PLUS) 0.5 % SOLN     order for DME     olopatadine (PATANOL) 0.1 % ophthalmic solution     nitroglycerin (NITROSTAT) 0.4 MG SL tablet     calcium carbonate-vitamin D 500-400 MG-UNIT TABS tablt     blood glucose monitoring (NO BRAND SPECIFIED) lancets     ORDER FOR DME     acetaminophen (TYLENOL) 325 MG tablet     bisacodyl (DULCOLAX) 5  MG EC tablet     Facility-Administered Medications Ordered in Other Encounters   Medication     oxyCODONE (ROXICODONE) 5 MG immediate release tablet     Review of Systems   Respiratory: Negative for shortness of breath.    Cardiovascular: Positive for chest pain.   Gastrointestinal: Positive for abdominal pain, nausea and vomiting.   All other systems reviewed and are negative.    Physical Exam   BP: (!) 152/103  Pulse: 67  Temp: 97.8  F (36.6  C)  Resp: 20  SpO2: 94 %  Physical Exam   Constitutional: He appears well-developed and well-nourished. No distress.   HENT:   Head: Normocephalic and atraumatic.   Neck: No JVD present.   Cardiovascular:   LVAD motor sounds.   Pulmonary/Chest: Effort normal and breath sounds normal. No accessory muscle usage. No tachypnea. No respiratory distress. He has no decreased breath sounds. He has no wheezes. He has no rales. He exhibits tenderness.       Abdominal: Soft. Normal appearance. He exhibits no distension. There is tenderness in the right upper quadrant. There is no rebound, no guarding and no CVA tenderness. No hernia.       Musculoskeletal: Normal range of motion.   Neurological: He is alert.   Skin: Skin is warm and dry. He is not diaphoretic. No pallor.   Psychiatric: He has a normal mood and affect.   Nursing note and vitals reviewed.    ED Course     ED Course     Procedures        Critical Care time:  none        Labs Ordered and Resulted from Time of ED Arrival Up to the Time of Departure from the ED   CBC WITH PLATELETS DIFFERENTIAL - Abnormal; Notable for the following:        Result Value    RDW 17.0 (*)     All other components within normal limits   INR - Abnormal; Notable for the following:     INR 2.00 (*)     All other components within normal limits   COMPREHENSIVE METABOLIC PANEL - Abnormal; Notable for the following:     Glucose 172 (*)     Creatinine 1.72 (*)     GFR Estimate 40 (*)     GFR Estimate If Black 48 (*)     AST 58 (*)     All other  components within normal limits   LIPASE   CARDIAC CONTINUOUS MONITORING     Xr Chest 1 View    Result Date: 3/27/2017  EXAM: XR CHEST 1 VW  3/27/2017 3:27 AM  HISTORY: right sided pain to RUQ. COMPARISON: 2/21/2017 FINDINGS: Single AP view of the chest. Left chest wall automatic implantable cardiac defibrillator with stable lead position. Nondisplaced sternotomy wires. Second-generation and lateral. Clear right lung. Clear left lung. No focal pulmonary airspace opacity.     IMPRESSION: No new focal pulmonary airspace opacity.     Abdomen Xr, 2 Vw, Flat And Upright    Result Date: 3/27/2017  Resident preliminary report:  Nonobstructive bowel gas pattern     Assessments & Plan (with Medical Decision Making)   I was physically present and have reviewed and verified the accuracy of this note documented by (myself).     Disclaimer: This note consists of symbols derived from keyboarding, dictation, and/or voice recognition software. As a result, there may be errors in the script that have gone undetected.  Please consider this when interpreting information found in the chart.These sections of the chart were reviewed for accuracy to the best of my knowledge and ability.    Patient was clinically assessed and consented to treatment. After assessment, medical decision making and workup were discussed with the patient. The patient was agreeable to plan for testing, workup, and treatment.  Sohan Rendon is a 67 year old male who presents today for right upper quadrant abdominal pain as well as left flank pain.  These are intermittent and come and go.  The right upper quadrant pain could be somewhat reproduced slightly on exam however quickly went away after patient's nausea resolved following Zofran IV.  Patient does have an LVAD however do not feel this is related to his heart.  IV had been established and labs were obtained.  Patient had no signs of anemia and hemoglobin are stable with no leukocytosis.  INR was therapeutic  at 2.0.  Glucose was slightly elevated along with creatinine although creatinine was at baseline.  Lipase was otherwise normal.  Patient's family seemed to be more concerned that he was having the constipation and the stomach irritation following the demonstration of milk of magnesia.  Chest x-ray was obtained that showed no acute findings and abdominal x-ray showed nonobstructive bowel gas pattern.  He did have some distended loops with air which could be concerning for some indigestion.  He was given GI cocktail with complete relief of symptoms and at this time I feel likely this is more related to his GERD and possibly constipation then any cardiac or pulmonary etiology.  Labs were unremarkable and after I discussed x-rays with the family they wished to be discharged home.  Patient was feeling better and required no further intervention.  For the constipation we discussed treatment however patient and family state they already have senna at home and will use this for his constipation.  I recommended 1 tablet twice a day and stopping if he starts having loose stools to help resolve his constipation.  Family and patient were comfortable with this plan and will be discharged home by medical transport.      I have reviewed the nursing notes.    I have reviewed the findings, diagnosis, plan and need for follow up with the patient.    New Prescriptions    No medications on file       Final diagnoses:   Gastroesophageal reflux disease with esophagitis   Other constipation       3/27/2017   [facility], [place], EMERGENCY DEPARTMENT     Sebastian Chao MD  03/27/17 [id number]

## 2017-03-27 NOTE — MR AVS SNAPSHOT
Sohan [facility]   3/27/2017   Anticoagulation Therapy Visit    Description:  67 year old male   Provider:  Sarah Gill, RN   Department:  [facility]           INR as of 3/27/2017     Today's INR       Anticoagulation Summary as of 3/27/2017     INR goal    Prior goal 1.8-2.5   Today's INR    Next INR check     Indications   LVAD (left ventricular assist device) present [Z95.811]  Long-term (current) use of anticoagulants [Z79.01] [Z79.01]         March 2017 Details    Sun Mon Tue Wed Thu Fri Sat        1               2               3               4                 5               6               7               8               9               10               11                 12               13               14               15               16               17               18                 19               20               21               22               23      4.5 mg   See details      24      3 mg         25      3 mg           26      4.5 mg         27      3 mg         28      4.5 mg         29      3 mg         30            31                 Date Details   03/23 This INR check       Date of next INR:  3/30/2017         How to take your warfarin dose     To take:  3 mg Take 1 of the 3 mg tablets.    To take:  4.5 mg Take 1.5 of the 3 mg tablets.

## 2017-03-27 NOTE — PROGRESS NOTES
Sohan was seen in the ED on 3/27 for abdominal pain. Felt possible due to constipation. Was discharged home. INR during visit was 2.0. No call made as no change to plan is indicated.

## 2017-03-27 NOTE — ED AVS SNAPSHOT
[facility], Emergency Department    [address] [address]    Phone:  [phone]                                       Sohan Rendon   MRN: [id number]    Department:  [facility], Emergency Department   Date of Visit:  3/27/2017           Patient Information     Date Of Birth          1950        Your diagnoses for this visit were:     Gastroesophageal reflux disease with esophagitis     Other constipation        You were seen by Sebastian Chao MD.        Discharge Instructions       -Please make an appointment to follow up with Your Primary Care Provider in 1-2 days if not improving.  -As discussed in the recommend starting the senna as previously prescribed this morning and again in the evening if not producing bowel movement.  And then twice daily one tablet as needed for further constipation.  -Recommend monitoring for any return of or worsening pain, fevers, or nausea/vomiting that are uncontrolled and unable to keep any fluids down. If you are restarting oral intake back to normal diet recommend small amounts of food with increases to normal as tolerated.      Treating Constipation  Constipation is a common and often uncomfortable problem. Constipation means you have bowel movements fewer than three times per week, or strain to pass hard, dry stool. It can last a short time. Or it can be a problem that never seems to go away. The good news is that it can often be treated and controlled.    Eat more fiber  One of the best ways to help treat constipation is to increase your fiber intake. You can do this either through diet or by using fiber supplements. Fiber (in whole grains, fruits, and vegetables) adds bulk and absorbs water to soften the stool. This helps the stool pass through the colon more easily. When you increase your fiber intake, do it slowly to avoid side effects such as bloating. Also increase the amount of water that you drink. Eating more of the following  foods can add fiber to your diet.    High-fiber cereals    Whole grains, bran, and brown rice    Vegetables such as carrots, broccoli, and greens    Fresh fruits (especially apples, pears, and dried fruits like raisins and apricots)    Nuts and legumes (especially beans such as lentils, kidney beans, and lima beans)  Get physically active  Exercise helps improve the working of your colon which helps ease constipation. Try to get some physical activity every day. If you haven t been active for a while, talk to your health care provider before starting again.  Laxatives  Your health care provider may suggest an over-the-counter product to help ease your constipation. He or she may suggest the use of bulk-forming agents or laxatives. The use of laxatives, if used as directed, is common and safe. Follow directions carefully when using them. See your health care provider for new-onset constipation, to rule out other causes such as medications or thyroid disease.    7767-3456 [employer]. [address] [place] [address]. All rights reserved. This information is not intended as a substitute for professional medical care. Always follow your healthcare professional's instructions.          Future Appointments        Provider Department Dept Phone Center    4/11/2017 2:00 PM Thomas Dill MD [facility] [phone] [facility]    5/9/2017 2:00 PM Thomas Dill MD [facility] [phone] [facility]    5/11/2017 10:00 AM  HEALTH IMAGING [facility] ECHO ROOM 2  Health Echo [phone] [facility]    5/11/2017 11:00 AM Lab  Health Lab [phone] [facility]    5/11/2017 11:30 AM UC CV DEVICE 1              [address] [place] [phone] [facility]    5/11/2017 12:00 PM Maureen Grant NP [place] [phone] [facility]    6/13/2017 2:00 PM Thomas Dill MD [facility] [phone] [facility]      24 Hour Appointment Hotline       To make an appointment  at any [facility] clinic, call [phone] ([phone]). If you don't have a family doctor or clinic, we will help you find one. [facility] clinics are conveniently located to serve the needs of you and your family.             Review of your medicines      Our records show that you are taking the medicines listed below. If these are incorrect, please call your family doctor or clinic.        Dose / Directions Last dose taken    acetaminophen 325 MG tablet   Commonly known as:  TYLENOL   Dose:  650 mg   Quantity:  100 tablet        Take 2 tablets (650 mg) by mouth every 6 hours   Refills:  0        allopurinol 100 MG tablet   Commonly known as:  ZYLOPRIM   Dose:  100 mg   Quantity:  90 tablet        Take 1 tablet (100 mg) by mouth daily   Refills:  3        ascorbic acid 500 MG tablet   Commonly known as:  VITAMIN C   Dose:  500 mg   Quantity:  90 tablet        Take 1 tablet (500 mg) by mouth daily With iron pill   Refills:  3        atorvastatin 10 MG tablet   Commonly known as:  LIPITOR   Dose:  10 mg   Quantity:  30 tablet        Take 1 tablet (10 mg) by mouth daily   Refills:  5        azelastine 0.05 % Soln ophthalmic solution   Commonly known as:  OPTIVAR   Dose:  1 drop   Quantity:  1 Bottle        Apply 1 drop to eye 2 times daily   Refills:  11        bisacodyl 5 MG EC tablet   Commonly known as:  DULCOLAX   Dose:  5 mg   Quantity:  20 tablet        Take 1 tablet (5 mg) by mouth daily as needed for constipation   Refills:  1        blood glucose monitoring lancets   Quantity:  1 Box        Use to test blood sugars 2 times daily or as directed.   Refills:  3        blood glucose monitoring test strip   Commonly known as:  no brand specified   Quantity:  1 Box        Use to test blood sugar 2 times daily or as directed.   Refills:  3        calcium carbonate-vitamin D 500-400 MG-UNIT Tabs per tablet   Dose:  1 tablet   Quantity:  180 tablet        Take 1 tablet by mouth 2 times daily   Refills:  3         carboxymethylcellulose 0.5 % Soln ophthalmic solution   Commonly known as:  REFRESH PLUS   Dose:  1 drop   Quantity:  30 mL        Place 1 drop into the right eye 3 times daily as needed for other (eye irritation or discomfort.)   Refills:  3        ferrous sulfate 325 (65 FE) MG tablet   Commonly known as:  IRON   Dose:  325 mg   Quantity:  90 tablet        Take 1 tablet (325 mg) by mouth daily (with breakfast)   Refills:  3        guaiFENesin-dextromethorphan 100-10 MG/5ML syrup   Commonly known as:  ROBITUSSIN DM   Dose:  5 mL   Quantity:  560 mL        Take 5 mLs by mouth every 4 hours as needed for cough   Refills:  3        levETIRAcetam 750 MG tablet   Commonly known as:  KEPPRA   Dose:  750 mg   Quantity:  180 tablet        Take 1 tablet (750 mg) by mouth 2 times daily   Refills:  3        loratadine 10 MG tablet   Commonly known as:  CLARITIN   Dose:  10 mg   Quantity:  90 tablet        Take 1 tablet (10 mg) by mouth daily   Refills:  3        losartan 25 MG tablet   Commonly known as:  COZAAR   Dose:  25 mg   Quantity:  45 tablet        Take 1 tablet (25 mg) by mouth daily   Refills:  3        magnesium hydroxide 400 MG/5ML suspension   Commonly known as:  MILK OF MAGNESIA   Dose:  30 mL   Quantity:  473 mL        Take 30 mLs by mouth daily as needed for heartburn   Refills:  1        melatonin 3 MG tablet   Dose:  3 mg        Take 1 tablet (3 mg) by mouth At Bedtime   Refills:  0        * metoprolol 25 MG 24 hr tablet   Commonly known as:  TOPROL-XL   Dose:  25 mg   Quantity:  90 tablet        Take 1 tablet (25 mg) by mouth every evening   Refills:  3        * metoprolol 50 MG 24 hr tablet   Commonly known as:  TOPROL-XL   Dose:  50 mg   Quantity:  90 tablet        Take 1 tablet (50 mg) by mouth daily   Refills:  3        nitroglycerin 0.4 MG sublingual tablet   Commonly known as:  NITROSTAT   Dose:  0.4 mg   Quantity:  30 tablet        Place 1 tablet (0.4 mg) under the tongue every 5 minutes as needed  for chest pain   Refills:  1        nystatin 228036 UNIT/GM Powd   Commonly known as:  MYCOSTATIN   Quantity:  60 g        Apply to affected areas of skin in the groin and on the scrotum three times a day as needed.   Refills:  3        olopatadine 0.1 % ophthalmic solution   Commonly known as:  PATANOL   Dose:  1 drop   Quantity:  1 Bottle        Place 1 drop into both eyes 2 times daily   Refills:  11        * order for DME   Quantity:  1 Device        Equipment being ordered: Lift chair.  Please see indications and face-to-face encounter details in 2/3/15 Office Visit note.   Refills:  0        * order for DME   Quantity:  2 each        Equipment being ordered: Bilateral leg supports for manual wheelchair.   Refills:  0        * order for DME   Quantity:  1 each        Equipment being ordered: Reclining manual w/c with bilateral elevating leg rests.   Refills:  0        * order for DME   Quantity:  1 each        Equipment being ordered: Hospital Bed, fully electric.   Refills:  0        * order for DME   Quantity:  1 each        Equipment being ordered: Wheelchair, manual.   Refills:  0        * order for DME   Quantity:  1 each        Equipment being ordered: Wheelchair, manual, with elevated leg rests and tilt in space back.  Please fax to [employer]; I called them 11/26/16 and they requested the new order.  Face to face is in my 10/26/16 note.   Refills:  0        * order for DME   Quantity:  2 each        Equipment being ordered: Cushioned heel boots.   Refills:  0        oxyCODONE 5 MG IR tablet   Commonly known as:  ROXICODONE   Dose:  5 mg   Quantity:  90 tablet        Take 1 tablet (5 mg) by mouth every 4 hours as needed for moderate to severe pain   Refills:  0        pantoprazole 40 MG EC tablet   Commonly known as:  PROTONIX   Dose:  40 mg   Quantity:  180 tablet        Take 1 tablet (40 mg) by mouth daily   Refills:  3        senna-docusate 8.6-50 MG per tablet   Commonly known as:  SENOKOT-S;PERICOLACE    Dose:  1-2 tablet   Quantity:  60 tablet        Take 1-2 tablets by mouth 2 times daily as needed for constipation   Refills:  3        simethicone 80 MG chewable tablet   Commonly known as:  MYLICON   Dose:  80 mg   Quantity:  180 tablet        Take 1 tablet (80 mg) by mouth 4 times daily   Refills:  5        tamsulosin 0.4 MG capsule   Commonly known as:  FLOMAX   Dose:  0.4 mg   Quantity:  90 capsule        Take 1 capsule (0.4 mg) by mouth daily   Refills:  3        Vitamin B-1 100 MG Tabs   Quantity:  90 tablet        TAKE 1 TABLET (100 MG) BY MOUTH DAILY   Refills:  3        warfarin 3 MG tablet   Commonly known as:  COUMADIN   Quantity:  180 tablet        Take 3mg by mouth on Mondays and Thursdays. Take 4.5mg by mouth all other days of the week.   Refills:  3        * Notice:  This list has 9 medication(s) that are the same as other medications prescribed for you. Read the directions carefully, and ask your doctor or other care provider to review them with you.            Procedures and tests performed during your visit     Abdomen XR, 2 vw, flat and upright    CBC with platelets differential    Cardiac Continuous Monitoring    Comprehensive metabolic panel    INR    Lipase    XR Chest 1 View      Orders Needing Specimen Collection     None      Pending Results     Date and Time Order Name Status Description    3/27/2017 0209 Abdomen XR, 2 vw, flat and upright Preliminary     3/27/2017 0207 XR Chest 1 View Preliminary             Pending Culture Results     No orders found from 3/25/2017 to 3/28/2017.            Thank you for choosing [facility]       Thank you for choosing [facility] for your care. Our goal is always to provide you with excellent care. Hearing back from our patients is one way we can continue to improve our services. Please take a few minutes to complete the written survey that you may receive in the mail after you visit with us. Thank you!        [employer] Information     [employer] lets you send  "messages to your doctor, view your test results, renew your prescriptions, schedule appointments and more. To sign up, go to [email or web address] . Click on \"Log in\" on the left side of the screen, which will take you to the Welcome page. Then click on \"Sign up Now\" on the right side of the page.     You will be asked to enter the access code listed below, as well as some personal information. Please follow the directions to create your username and password.     Your access code is: [email or web address]  Expires: 2017 12:00 PM     Your access code will  in 90 days. If you need help or a new code, please call your [facility] clinic or [phone].        Care EveryWhere ID     This is your Care EveryWhere ID. This could be used by other organizations to access your [facility] medical records  [id number]        After Visit Summary       This is your record. Keep this with you and show to your community pharmacist(s) and doctor(s) at your next visit.                  "

## 2017-03-27 NOTE — ED AVS SNAPSHOT
[facility], [place], Emergency Department    [address] [address]    Phone:  [phone]                                       Sohan Rendon   MRN: [id number]    Department:  [facility], Emergency Department   Date of Visit:  3/27/2017           After Visit Summary Signature Page     I have received my discharge instructions, and my questions have been answered. I have discussed any challenges I see with this plan with the nurse or doctor.    ..........................................................................................................................................  Patient/Patient Representative Signature      ..........................................................................................................................................  Patient Representative Print Name and Relationship to Patient    ..................................................               ................................................  Date                                            Time    ..........................................................................................................................................  Reviewed by Signature/Title    ...................................................              ..............................................  Date                                                            Time

## 2017-03-28 ENCOUNTER — CARE COORDINATION (OUTPATIENT)
Dept: GERIATRIC MEDICINE | Facility: CLINIC | Age: 67
End: 2017-03-28

## 2017-03-28 DIAGNOSIS — K59.01 SLOW TRANSIT CONSTIPATION: ICD-10-CM

## 2017-03-28 NOTE — TELEPHONE ENCOUNTER
Patient was in the hospital on 3/27/2017 for GERD. Patient is scheduled to see Dr. Thomas Dill on 4/11/2017. Will route to nurse to follow up with patient.

## 2017-03-28 NOTE — PROGRESS NOTES
TC to client after ER visit. Spoke with client's daughter Almaz. She stated that client is doing a little better. They are taking it easy with food for client. They gave him a suppository and client had a BM but is still very bloated. Discussed that he has an appointment with his PCP on April 11. She stated that they were out of client's oxycodone. CM advised them to speak to client's doctor about that. No other needs identified at this time.  Kecia Cat RN Case Manager  [facility]  [phone]

## 2017-03-30 ENCOUNTER — TELEPHONE (OUTPATIENT)
Dept: FAMILY MEDICINE | Facility: CLINIC | Age: 67
End: 2017-03-30

## 2017-03-30 DIAGNOSIS — S72.001K CLOSED FRACTURE OF NECK OF RIGHT FEMUR WITH NONUNION, SUBSEQUENT ENCOUNTER: ICD-10-CM

## 2017-03-30 RX ORDER — OXYCODONE HYDROCHLORIDE 5 MG/1
5 TABLET ORAL EVERY 4 HOURS PRN
Qty: 30 TABLET | Refills: 0 | Status: SHIPPED | OUTPATIENT
Start: 2017-03-30 | End: 2018-01-01

## 2017-03-30 RX ORDER — SIMETHICONE 80 MG
80 TABLET,CHEWABLE ORAL 4 TIMES DAILY
Qty: 180 TABLET | Refills: 5 | Status: ON HOLD | OUTPATIENT
Start: 2017-03-30 | End: 2018-01-01

## 2017-03-30 NOTE — TELEPHONE ENCOUNTER
"ED/Discharge Protocol    \"Hi, my name is Ryder Burr, a registered nurse, and I am calling on behalf of Dr. Dill's office at [facility].  I am calling to follow up and see how things are going for you after your recent visit.\"    \"I see that you were in the (ER/UC/IP) on 3/27/17.    How are you doing now that you are home?\" Daughter reports patient doing fine    Is patient experiencing symptoms that may require a hospital visit?  None     Discharge Instructions    \"Let's review your discharge instructions.  What is/are the follow-up recommendations?  Pt. Response: f/u with provider    \"Were you instructed to make a follow-up appointment?\"  Pt. Response: Yes.  Has appointment been made?   Yes  4/11/17    \"When you see the provider, I would recommend that you bring your discharge instructions with you.    Medications    \"How many new medications are you on since your hospitalization/ED visit?\"    0-1  \"How many of your current medicines changed (dose, timing, name, etc.) while you were in the hospital/ED visit?\"   0-1  \"Do you have questions about your medications?\"   No  \"Were you newly diagnosed with heart failure, COPD, diabetes or did you have a heart attack?\"   No  For patients on insulin: \"Did you start on insulin in the hospital or did you have your insulin dose changed?\"   No    Medication reconciliation completed? Yes    Was MTM referral placed (*Make sure to put transitions as reason for referral)?   No    Call Summary    \"Do you have any questions or concerns about your condition or care plan at the moment?\"    No  Triage nurse advice given: Call with questions/concerns    Patient was in ER 18 in the past year (assess appropriateness of ER visits.)      \"If you have questions or things don't continue to improve, we encourage you contact us through the main clinic number,  [phone].  Even if the clinic is not open, triage nurses are available 24/7 to help you.     We would like you to know that our " "clinic has extended hours (provide information).  We also have urgent care (provide details on closest location and hours/contact info)\"      \"Thank you for your time and take care!\"      Spoke with daughter who states she has patient on a softer diet now and not giving MOM.  This has been working much better for patient.  She states he still has some bloating and wondering what to do for this.  Noted patient has simethicone on his current med list.  She states he is out of this meds and would like refill.  She also states he was prescribed oxycodone 5 mg in the hospital but the ones they have at home are .  She would like a refill for this also.      Cued refill for simethicone.  Uncertain about the oxycodone since she thought this may have been ordered at the hospital so did not cue this.    Routing to provider to review.    Concetta Burr RN    "

## 2017-03-30 NOTE — TELEPHONE ENCOUNTER
Noted.  No further action required.      Rx will need to be mailed so will route to [facility] to complete.    Concetta Burr RN

## 2017-03-30 NOTE — TELEPHONE ENCOUNTER
Left voicemail for Almaz daughter to return call with update on patient.    Patient went to ED/Hospital 18 times in past year.  Informed SHAW[facility] Clinical Coordinator that patient was seen in ED.    Await daughter's return call.    Concetta Burr RN

## 2017-03-31 ENCOUNTER — TELEPHONE (OUTPATIENT)
Dept: FAMILY MEDICINE | Facility: CLINIC | Age: 67
End: 2017-03-31

## 2017-04-03 ENCOUNTER — ANTICOAGULATION THERAPY VISIT (OUTPATIENT)
Dept: ANTICOAGULATION | Facility: CLINIC | Age: 67
End: 2017-04-03

## 2017-04-03 DIAGNOSIS — Z79.01 LONG-TERM (CURRENT) USE OF ANTICOAGULANTS: ICD-10-CM

## 2017-04-03 DIAGNOSIS — Z95.811 LVAD (LEFT VENTRICULAR ASSIST DEVICE) PRESENT (H): ICD-10-CM

## 2017-04-03 LAB — INR PPP: 2

## 2017-04-03 NOTE — MR AVS SNAPSHOT
[facility]   4/3/2017   Anticoagulation Therapy Visit    Description:  67 year old male   Provider:  Dafne Sanders RN   Department:  [facility]           INR as of 4/3/2017     Today's INR 2.0      Anticoagulation Summary as of 4/3/2017     INR goal    Prior goal 1.8-2.5   Today's INR 2.0   Full instructions 4.5 mg on Sun, Tue, Thu; 3 mg all other days   Next INR check 4/17/2017    Indications   LVAD (left ventricular assist device) present [Z95.811]  Long-term (current) use of anticoagulants [Z79.01] [Z79.01]         April 2017 Details    Sun Mon Tue Wed Thu Fri Sat           1                 2               3      3 mg   See details      4      4.5 mg         5      3 mg         6      4.5 mg         7      3 mg         8      3 mg           9      4.5 mg         10      3 mg         11      4.5 mg         12      3 mg         13      4.5 mg         14      3 mg         15      3 mg           16      4.5 mg         17            18               19               20               21               22                 23               24               25               26               27               28               29                 30                      Date Details   04/03 This INR check       Date of next INR:  4/17/2017         How to take your warfarin dose     To take:  3 mg Take 1 of the 3 mg tablets.    To take:  4.5 mg Take 1.5 of the 3 mg tablets.

## 2017-04-03 NOTE — PROGRESS NOTES
ANTICOAGULATION FOLLOW-UP CLINIC VISIT    Patient Name:  Sohan Rendon  Date:  4/3/2017  Contact Type:  Telephone    SUBJECTIVE:     Patient Findings     Positives No Problem Findings           OBJECTIVE    INR   Date Value Ref Range Status   04/03/2017 2.0  Final     Chromogenic Factor 10   Date Value Ref Range Status   08/10/2014 24 (L) 70 - 130 % Final     Comment:     Therapeutic Range:  A Chromogenic Factor 10 level of approximately 20-40%   inversely correlates with an INR of 2-3 for patients receiving Warfarin.   Chromogenic Factor 10 levels below 20% indicate an INR greater than 3 and   levels above 40% indicate an INR less than 2.       Factor 2 Assay   Date Value Ref Range Status   07/21/2014 25 (L) 60 - 140 % Final     Comment:     Analyte Specific Reagents (ASRs) are used in many laboratory tests necessary   for   standard medical care and generally do not require FDA approval.  This test   was   developed and its performance characteristics determined by [facility].  It has not been cleared or approved by   the [place] Food and Drug Administration.         ASSESSMENT / PLAN  INR assessment THER    Recheck INR In: 2 WEEKS    INR Location [facility]      Anticoagulation Summary as of 4/3/2017     INR goal    Prior goal 1.8-2.5   Today's INR 2.0   Maintenance plan 4.5 mg (3 mg x 1.5) on Sun, Tue, Thu; 3 mg (3 mg x 1) all other days   Full instructions 4.5 mg on Sun, Tue, Thu; 3 mg all other days   Weekly total 25.5 mg   Plan last modified Blanca Bah RN (2/27/2017)   Next INR check 4/17/2017   Priority INR   Target end date Indefinite    Indications   LVAD (left ventricular assist device) present [Z95.811]  Long-term (current) use of anticoagulants [Z79.01] [Z79.01]         Anticoagulation Episode Summary     INR check location     Preferred lab     Send INR reminders to [facility]    Comments Goal Range is 1.8-2.3  [facility] comes out to  draw INR  Daughter Almaz Ph [phone]      Anticoagulation Care Providers     Provider Role Specialty Phone number    Evon Lemons MD Responsible Cardiology [phone]            See the Encounter Report to view Anticoagulation Flowsheet and Dosing Calendar (Go to Encounters tab in chart review, and find the Anticoagulation Therapy Visit)    Spoke with [facility] nurse.    Dafne Sanders RN

## 2017-04-11 ENCOUNTER — OFFICE VISIT (OUTPATIENT)
Dept: FAMILY MEDICINE | Facility: CLINIC | Age: 67
End: 2017-04-11
Payer: COMMERCIAL

## 2017-04-11 DIAGNOSIS — M54.2 NECK PAIN: ICD-10-CM

## 2017-04-11 DIAGNOSIS — Z96.1 PSEUDOPHAKIA, RIGHT EYE: ICD-10-CM

## 2017-04-11 DIAGNOSIS — I50.22 CHF (CONGESTIVE HEART FAILURE), NYHA CLASS IV, CHRONIC, SYSTOLIC (H): ICD-10-CM

## 2017-04-11 DIAGNOSIS — I49.9 CARDIAC ARRHYTHMIA, UNSPECIFIED CARDIAC ARRHYTHMIA TYPE: Primary | ICD-10-CM

## 2017-04-11 DIAGNOSIS — R13.12 OROPHARYNGEAL DYSPHAGIA: ICD-10-CM

## 2017-04-11 DIAGNOSIS — G47.01 INSOMNIA DUE TO MEDICAL CONDITION: ICD-10-CM

## 2017-04-11 DIAGNOSIS — J30.1 NON-SEASONAL ALLERGIC RHINITIS DUE TO POLLEN: ICD-10-CM

## 2017-04-11 DIAGNOSIS — I63.411 CEREBRAL INFARCTION DUE TO EMBOLISM OF RIGHT MIDDLE CEREBRAL ARTERY (H): ICD-10-CM

## 2017-04-11 DIAGNOSIS — H57.89 IRRITATION OF RIGHT EYE: ICD-10-CM

## 2017-04-11 DIAGNOSIS — Z95.811 LVAD (LEFT VENTRICULAR ASSIST DEVICE) PRESENT (H): ICD-10-CM

## 2017-04-11 DIAGNOSIS — S72.001K: ICD-10-CM

## 2017-04-11 PROCEDURE — 99350 HOME/RES VST EST HIGH MDM 60: CPT

## 2017-04-11 NOTE — PROGRESS NOTES
SUBJECTIVE:                                                      Sohan Rendon is a 67 year old male who has an LVAD due to severe ischemic cardiomyopathy. He has had recurrent embolic strokes, despite careful anticoagulation, with the LVAD believed to be the source. He was hospitalized 9/23 - 10/4/14 for acute ischemic subcortical stroke, superimposed upon previous R PICA and R MCA strokes. During that hospital stay, several discussions were carried out with the patient's family regarding the possibility of LVAD removal and conversion to Hospice. Per my discussion with his attending cardiologist, Dr. Lemons, family are well aware of the patient's terminal condition, but they steadfastly decline enrolling him into Hospice. They have wished to continue LVAD and other cares at home, while ensuring his comfort. Dr. Lemons made a referral for this patient to be seen at home by [facility] to provide primary care management, though the LVAD/Cardiology clinics will remain in charge of his cardiology care.       Mr. Rendon was seen today in his home along with a daughter and an  for the above issues, in follow-up of a recent ED visit, as well as for the following health issues:         Heart Failure Follow-up    Patient with end-stage cardiomyopathy, Stage D, [facility] IIIB. LVAD in place. History of recurrent emboli from LVAD despite careful anticoagulation. Patient is not a candidate for device revision or exchange per Cardiology notes.    Symptoms: No recent recurrence of vague left anterior chest pain that can persist for days at a time, and which he and I have previously attributed to muscular strain.    Shortness of breath: Happens at rest infrequently, but not worse.    Lower extremity edema: No more than trace pedal edema at baseline, currently stable.    Chest pain: See above.    Using more pillows than normal: N/A; has adjustable (hospital) bed.    Cough at night: Yes- occasional cough, present  "for months, believed to be due to non-cardiac causes.    Weight: Not checking weight daily; patient unable to weight bear.    Cardiology visits, ER/UC, or hospital admissions since last visit: Not for cardiac disease or symptoms.    Medication side effects: None described to cardiac meds.          Cerebrovascular Follow-up    History of multiple embolic strokes, LVAD source, despite warfarin anticoagulation. Most recent documented stroke September 2014.    Residual symptoms: Dysarthria, dysphagia, left arm and leg weakness. Dysarthria has improved remarkably. Dense left hemiparesis is unimproved. Dysphagia has improved to permit PO feeding of an unrestricted texture diet.  Diagnosed with new-onset seizure disorder 6/2015; see below.    Worsened or new symptoms since last visit: None.    Daily aspirin use: Stopped during 9/2016 hospital stay for gross hematuria and remains on hold.  At Urology recommendation, ASA is on hold until hematuria confirmed resolved.  Warfarin has continued, though inpatient Urology notes suggest a more conservative INR target of 1.8 to 2.3 instead of the prior Cardiology target of 2.5 to 3.0 (indication: history of recurrent stroke associated with LVAD).  I sent messages to Urology and Cardiology to see if these restrictions still apply, and heard back from Dr. Lemons, Cardiology.  Dr. Lemons has advised that we should continue to hold ASA, and continue to aim for INR 1.8 to 2.3, until further notice.          Seizures    Duration: Initially diagnosed during hospitalization of 6/2015. Was noted to have seizure in-house, levetiracetam started.    Description (location/character/radiation): Described as \"focal onset epilepsy\" in Neuro consultation.    Intensity: No witnessed seizure activity since return home from 6/2015 admission.    History (similar episodes/previous evaluation): Neuro consultation during hospital stay 6/2015. No neuro follow-up scheduled.    Precipitating or alleviating " factors: History of multiple embolic CVA due to LVAD thrombosis, most recently 9/2014.    Therapies tried and outcome: Levetiracetam 500 mg BID started 6/2015, no witnessed seizure activity since Rx started.                                                                                      Amount of exercise or physical activity: None; essentially bed bound. Family says he can stand for brief periods of time if supported, cannot walk. There is a Jay lift at home used for transfer to chair or wheelchair. Family requested replacement manual wheelchair that will permit positioning of lower extremities given right leg fracture which I ordered 3/2016, and which has finally arrived. Current hospital bed controls have failed, new Rx for hospital bed sent to [facility] on 8/19/16, no word yet on delivery per daughter.    Problems taking medications regularly: Has some mild dysphagia, but is not missing doses. Meds administered by daughters.    Medication side effects: Lethargic occasionally following oxycodone doses, though has been using very little recently.    Diet: regular (no restrictions). Daughters feed him. No witnessed choking or aspiration.    Problem list and histories reviewed & adjusted, as indicated.  Additional history: as documented    BP Readings from Last 3 Encounters:   03/27/17 105/87   03/16/17 98/58   03/15/17 102/85    Wt Readings from Last 3 Encounters:   03/15/17 180 lb (81.6 kg)   02/21/17 182 lb (82.6 kg)   02/11/17 98 lb 8 oz (44.7 kg)                  Labs reviewed in EPIC    Reviewed and updated as needed this visit by clinical staff       Reviewed and updated as needed this visit by Provider       ROS:  Obtained both from patient's daughter, as well as the patient himself via .   CONSTITUTIONAL: NEGATIVE for chills, fever, sweats.   EYES: Reports itching and pain from OD, but no change in acuity from that eye. I made Ophthalmology referral last visit, daughters received a call  back but have not scheduled appointment. OS without symptoms.  ENT/MOUTH: NEGATIVE for nasal congestion, sinus pressure. No recurrence of epistaxis since last visit.  RESP: NEGATIVE for dyspnea, wheeze, or respiratory chest pain. Cough has been mild/minimal since last visit.  CV: NEGATIVE for chest pain, orthopnea, or worsened lower extremity edema.   GI: NEGATIVE for abdominal pain, diarrhea, nausea or vomiting. No melena or hematochezia noted. Seen in ED for constipation 3/27/17.  No labs done.  Was prescribed simethicone, which he is receiving 3 to 4 times a day.  This has improved bloating and discomfort.  Constipation is now generally well-controlled on Senna 1 or 2 per day; has had daily BM since ED visit.  : NEGATIVE for hematuria, dysuria, or flank pain.   MUSCULOSKELETAL: NEGATIVE for change or worsening in right hip pain.  PT came to see him on my referral re neck stiffness and occipital head pain.  Daughter says that patient was unable to participate in the visit, so PT wrote down some instructions for limb exercises, but not neck movements or therapy, which daughters are therefore not doing.  Neck and head pain are unchanged.  Currently using only rare oxycodone.    INTEG: Previous intertriginous rash in groin has improved. No rashes.  NEURO: NEGATIVE for new or worsened focal numbness or weakness or syncope.    PSYCHIATRIC: NEGATIVE for changes in mild baseline anxiety, no panic, no recent change in mood.    OBJECTIVE:                                                    HR 62, R 14, SpO2 99% on room air.  There is no height or weight on file to calculate BMI.    GENERAL: Appears clean and comfortable, propped upright in bed.  EYES: Eyes grossly normal to inspection, no conjunctivitis or discharge.  HENT: Nares patent by sniff, no discharge, no evidence of recent nasal bleeding.    NECK: Trachea midline, no stridor.    RESP: No accessory muscle use. No cough during this visit.  Symmetrical breath  sounds. Lungs clear throughout on inspiration and expiration. Expiration not prolonged, no wheeze.  CV: Regular rate and rhythm, non-tachycardic. Prominent LVAD noise, which sounds like someone is running a vacuum  in the near background, makes exam difficult. S1 S2 softly audible, no murmur or extra sound. No lower extremity edema. Extremities cool x4.  ABDOMEN: LVAD entry site not undressed for exam. Protuberant, though soft, non-tender, no guarding over all quadrants. Liver and spleen not palpable, no masses palpable. Bowel sounds positive.  MS: No bony deformities noted, including at fractured right femur. No pain reproduced by passive ROM of either hip or leg. No red or inflamed joints.  Pain reproducible by palpation of right paraspinous neck muscles and nuchal ridge.  SKIN: Warm and dry, no rashes where skin visible.    NEURO: Alert and conversant, oriented and appropriate in conversation per , though some very mild dysarthria present. Mild left facial droop noted, cranial nerves II - XII otherwise grossly intact.  Dense left upper and lower extremity paresis persists.  PSYCH: Calm, conversant. Language barrier prevents good exam, though affect appears normal.    Diagnostic Test Results:  Results for orders placed or performed in visit on 04/03/17   INR   Result Value Ref Range    INR 2.0      *Note: Due to a large number of results and/or encounters for the requested time period, some results have not been displayed. A complete set of results can be found in Results Review.        ASSESSMENT/PLAN:                                                    (M54.2) Neck pain   Comment: Has had reproducible pain on palpation of left neck paraspinous muscles and the nuchal ridge for over a year. I ordered PT eval/treat last visit, and PT came, but daughter tells me that patient could not participate in the visit (I don't know what this means; she did not elaborate).  PT left instructions for  "family-directed therapy at home.  I reviewed those instructions, which focus on PROM exercises of the lower extremities.  No recommendations were made for therapy directed at neck muscles.  Daughters are (appropriately) not doing the leg exercises.  Plan: Discussed with daughter; I offered another referral for PT eval and treat neck and occiput pain, but daughter advises waiting for now.     (H57.8) Irritation of left eye  Comment: Patient has history of (H26.9) Cataract, right and (Z96.1) Pseudophakia right eye, S/P cataract surgery 11/2015 by Dr. Cosme. Has reported that OD \"itches\" and sometimes burns beginning several months after the operation, and reports recurrence of these symptoms today without change in OD visual acuity. I made Ophthalmology referral last visit.  Daughter has been contacted, but has not yet scheduled an appointment.  Plan: Await Ophthalmology evaluation.      (J30.9) Allergic rhinitis, unspecified allergic rhinitis type  Comment: Persistent symptoms.  Plan: Had to stop fluticasone (FLONASE) 50 MCG/ACT nasal spray due to epistaxis.  Continue loratadine, observe.      (I50.22) CHF (congestive heart failure), NYHA class IV, (Z95.811) LVAD (left ventricular assist device) present  Comment: Patient has recurrent embolic events from his LVAD, including recurrent stroke, despite careful anticoagulation. His LVAD cannot be exchanged, per my conversation with Dr. Lemons, Cardiology. Heart failure overall appears to be well-compensated today. VAD interrogated during recent cardiology visit and hospital stay, no abnormalities found.  No medication changes noted since my last visit with him.  Plan: Continue current care, though focusing on palliative care. He continues on warfarin as directed by [facility] Clinic. Continue to defer management of rhythm, hemodynamics, anticoagulation to the Cardiology/LVAD Clinic.  Previous INR target was 2.5 to 3.0 per Cardiology, but a lower target INR of 1.8 to 2.3 is " "currently being used due to recurrent gross hematuria.  ASA reamains on hold at Cardiology advice due to hematuria.  These INR and ASA modifications are to continue until further notice, per message from Cardiology.  Next Cardiology follow-up 5/11/17.       (G47.01) Insomnia due to medical condition    Comment: Episodic sleep-initiation difficulties, not severe.  He is using acetaminophen/diphenhydramine and melatonin OTC with some relief.  Plan: Continue \"Tylenol PM\" (acetaminophen/diphenhydramine) and melatonin 3 mg at HS PRN.      (R56.9) Focal onset epilepsy  Comment: Seizure at home 6/2015 prompted hospitalization, patient now on levetiracetam. No additional seizure activity noted since Rx started.   Plan: Continue levetiracetam 500 mg Q12H, observe for seizure recurrence.       (S72.001K) Fracture of femoral neck, right, closed, subsequent encounter  Comment: S/P hospitalization 1/2015. Family declined surgery, pain control now the primary goal. Patient seen most recently in Sports Med clinic on 4/7/15 by Dr. Taylor. No specific therapy recommended, and no orthopedics follow-up necessary.  Seen in ED 2/2016 due to recurrent hip pain after PT, imaging showed no change, symptoms resolved without change in Rx.  Plan: Patient and daughter report generally good pain control on oxycodone IR 5 mg Q6H PRN, which he is now using rarely. PT was helpful at improving patient's tolerance of PROM of hip, and reducing pain and muscle cramping, but is completed now.  Could resume PT if needed for pain, immobility in future.      (I63.411) Cerebrovascular accident 9/2014 due to embolism of R MCA  Comment: Residual deficits include dysarthria, which has improved remarkably, dysphagia (see below), dense left hemiparesis, and new-onset epilepsy as of 6/2015. Given that source of embolic strokes is from LVAD, and that embolism cannot be prevented despite anticoagulation, future events are possible or likely. He had what appears to " have been a TIA during 4/2016 hospital stay when warfarin and ASA were held due to gross hematuria, but subsequent neuro workup did not reveal new stroke, and daughters report stable neuro status since return home.  Plan: Warfarin anticoagulation continues per Cardiology, ASA held due to hematuria, details above.  Observe for new events.      (R13.10) Dysphagia  Comment: Had bedside swallow evaluation from Speech Therapy 12/2014. He had timbo aspiration only to thin liquids. Per daughters, although they have to feed him due to weakness from stroke, he appears to swallow without choking or aspiration episodes.  Plan: Continue speech therapy recommendations: patient should be sitting straight up when eating, take small bolus sizes, eat slowly, not talk during eating. Continue PO diet.    FUTURE APPOINTMENTS:       - Follow-up visit scheduled for 5/9/17 at 1400.    Face to face time was 39 minutes, at least 50% of which was spent in counseling regarding management of constipation, neck pain, and eye irritation.      Thomas Dill MD  [facility]

## 2017-04-11 NOTE — MR AVS SNAPSHOT
After Visit Summary   4/11/2017    Sohan Rendon    MRN: [id number]           Patient Information     Date Of Birth          1950        Visit Information        Provider Department      4/11/2017 2:00 PM Thomas Dill MD; [employer] [facility]        Today's Diagnoses     Cardiac arrhythmia, unspecified cardiac arrhythmia type    -  1    LVAD (left ventricular assist device) present        Displaced fracture of right femoral neck, closed, with nonunion, subsequent encounter        Pseudophakia, right eye        Insomnia due to medical condition        Neck pain        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  I'm glad the simethicone is helping.  Please feel free to continue taking that up to 4 times a day as needed for bloating or gas.    2.  I removed magnesium hydroxide (Milk of Magnesia) from your medication list, since it didn't help, and may have added to your upset stomach.    3.  We made no medication changes today.    4.  Please remember to schedule the eye doctor appointment, as it is clear that his right eye is still bothering him.    5.  I'll be back to see you on Tuesday, May 9th at about 2 PM.        Follow-ups after your visit        Your next 10 appointments already scheduled     Apr 24, 2017  9:30 AM CDT   RETURN GENERAL with Kemal Gann MD   Eye Clinic ([facility])    [facility] [address]  [address]  9[address] Clin [address]  [facility] [address]   [phone]            May 09, 2017  2:00 PM CDT   Return Visit with Thomas Dill MD   [facility] ([facility])    [address]  [address]  [facility] [address]   [phone]             May 11, 2017 10:00 AM CDT   Ech Complete Lvad* with UCECHCR2   [facility] ([facility])    [address] [place] [address]  3rd [facility] [address] [phone]           1.  Please bring or wear a comfortable two-piece outfit. 2.  You may eat, drink and take your normal medicines. 3.  For any questions that cannot be answered, please contact the ordering physician            May 11, 2017 11:00 AM CDT   Lab with UC LAB   [facility] ([facility])    [address] [place] [id number] [phone]            May 11, 2017 11:30 AM CDT   (Arrive by 11:15 AM)   Implanted Defibulator with Uc Cv Device 1   [facility] ([facility])    [address] [place] [id number] [phone]            May 11, 2017 12:00 PM CDT   (Arrive by 11:45 AM)   Ventricular Assist Device with Maureen [place] NP   [facility] ([facility])    [address] [place] [id number] [phone]            Jun 13, 2017  2:00 PM CDT   Return Visit with Thomas Dill MD   [facility] ([facility])    [address] [place] [address]  [address]  [facility] [id number] [phone]              Who to contact     If you have questions or need follow up information about today's clinic visit or your schedule please contact [facility] directly at [phone].  Normal or non-critical lab and imaging results will be communicated to you by MyChart, letter or phone within 4 business days after the clinic has received the results. If you do not hear from us within 7 days, please contact the clinic through MyChart or phone. If you have a critical or abnormal lab result, we will notify you by phone as soon as possible.  Submit refill requests through [employer] or call your pharmacy and they will forward the refill  "request to us. Please allow 3 business days for your refill to be completed.          Additional Information About Your Visit        [employer] Information     [employer] lets you send messages to your doctor, view your test results, renew your prescriptions, schedule appointments and more. To sign up, go to [email or web address] . Click on \"Log in\" on the left side of the screen, which will take you to the Welcome page. Then click on \"Sign up Now\" on the right side of the page.     You will be asked to enter the access code listed below, as well as some personal information. Please follow the directions to create your username and password.     Your access code is: [email or web address]  Expires: 2017 12:00 PM     Your access code will  in 90 days. If you need help or a new code, please call your [place] clinic or [phone].        Care EveryWhere ID     This is your Care EveryWhere ID. This could be used by other organizations to access your [place] medical records  [id number]         Blood Pressure from Last 3 Encounters:   17 105/87   17 98/58   03/15/17 102/85    Weight from Last 3 Encounters:   03/15/17 180 lb (81.6 kg)   17 182 lb (82.6 kg)   17 98 lb 8 oz (44.7 kg)              Today, you had the following     No orders found for display         Today's Medication Changes          These changes are accurate as of: 17 11:59 PM.  If you have any questions, ask your nurse or doctor.               These medicines have changed or have updated prescriptions.        Dose/Directions    acetaminophen 325 MG tablet   Commonly known as:  TYLENOL   This may have changed:    - when to take this  - reasons to take this   Used for:  Femoral neck fracture, right, closed, initial encounter        Dose:  650 mg   Take 2 tablets (650 mg) by mouth every 6 hours   Quantity:  100 tablet   Refills:  0       azelastine 0.05 % Soln ophthalmic solution   Commonly known as:  OPTIVAR   This may have " changed:    - when to take this  - reasons to take this        Dose:  1 drop   Apply 1 drop to eye 2 times daily   Quantity:  1 Bottle   Refills:  11         Stop taking these medicines if you haven't already. Please contact your care team if you have questions.     magnesium hydroxide 400 MG/5ML suspension   Commonly known as:  MILK OF MAGNESIA                Where to get your medicines      These medications were sent to [facility]/pharmacy #[id number] - [place], [place] - [address]  [address], [place] [address]     Phone:  [phone]     calcium carbonate-vitamin D 500-400 MG-UNIT Tabs per tablet                Primary Care Provider Office Phone # Fax #    Thomas Dill [phone]        [facility] [address] [place], [place] [address] [address] [address]        Thank you!     Thank you for choosing [facility]  for your care. Our goal is always to provide you with excellent care. Hearing back from our patients is one way we can continue to improve our services. Please take a few minutes to complete the written survey that you may receive in the mail after your visit with us. Thank you!             Your Updated Medication List - Protect others around you: Learn how to safely use, store and throw away your medicines at www.disposemymeds.org.          This list is accurate as of: 4/11/17 11:59 PM.  Always use your most recent med list.                   Brand Name Dispense Instructions for use    acetaminophen 325 MG tablet    TYLENOL    100 tablet    Take 2 tablets (650 mg) by mouth every 6 hours       allopurinol 100 MG tablet    ZYLOPRIM    90 tablet    Take 1 tablet (100 mg) by mouth daily       ascorbic acid 500 MG tablet    VITAMIN C    90 tablet    Take 1 tablet (500 mg) by mouth daily With iron pill       atorvastatin 10 MG tablet    LIPITOR    30 tablet    Take 1 tablet (10 mg) by mouth daily       azelastine 0.05 % Soln ophthalmic solution     OPTIVAR    1 Bottle    Apply 1 drop to eye 2 times daily       bisacodyl 5 MG EC tablet    DULCOLAX    20 tablet    Take 1 tablet (5 mg) by mouth daily as needed for constipation       blood glucose monitoring lancets     1 Box    Use to test blood sugars 2 times daily or as directed.       blood glucose monitoring test strip    no brand specified    1 Box    Use to test blood sugar 2 times daily or as directed.       calcium carbonate-vitamin D 500-400 MG-UNIT Tabs per tablet     90 tablet    Take 1 tablet by mouth daily       carboxymethylcellulose 0.5 % Soln ophthalmic solution    REFRESH PLUS    30 mL    Place 1 drop into the right eye 3 times daily as needed for other (eye irritation or discomfort.)       ferrous sulfate 325 (65 FE) MG tablet    IRON    90 tablet    Take 1 tablet (325 mg) by mouth daily (with breakfast)       guaiFENesin-dextromethorphan 100-10 MG/5ML syrup    ROBITUSSIN DM    560 mL    Take 5 mLs by mouth every 4 hours as needed for cough       levETIRAcetam 750 MG tablet    KEPPRA    180 tablet    Take 1 tablet (750 mg) by mouth 2 times daily       loratadine 10 MG tablet    CLARITIN    90 tablet    Take 1 tablet (10 mg) by mouth daily       losartan 25 MG tablet    COZAAR    45 tablet    Take 1 tablet (25 mg) by mouth daily       melatonin 3 MG tablet      Take 1 tablet (3 mg) by mouth At Bedtime       * metoprolol 25 MG 24 hr tablet    TOPROL-XL    90 tablet    Take 1 tablet (25 mg) by mouth every evening       * metoprolol 50 MG 24 hr tablet    TOPROL-XL    90 tablet    Take 1 tablet (50 mg) by mouth daily       nitroglycerin 0.4 MG sublingual tablet    NITROSTAT    30 tablet    Place 1 tablet (0.4 mg) under the tongue every 5 minutes as needed for chest pain       nystatin 043207 UNIT/GM Powd    MYCOSTATIN    60 g    Apply to affected areas of skin in the groin and on the scrotum three times a day as needed.       olopatadine 0.1 % ophthalmic solution    PATANOL    1 Bottle    Place 1  drop into both eyes 2 times daily       * order for DME     1 Device    Equipment being ordered: Lift chair.  Please see indications and face-to-face encounter details in 2/3/15 Office Visit note.       * order for DME     2 each    Equipment being ordered: Bilateral leg supports for manual wheelchair.       * order for DME     1 each    Equipment being ordered: Reclining manual w/c with bilateral elevating leg rests.       * order for DME     1 each    Equipment being ordered: Hospital Bed, fully electric.       * order for DME     1 each    Equipment being ordered: Wheelchair, manual.       * order for DME     1 each    Equipment being ordered: Wheelchair, manual, with elevated leg rests and tilt in space back.  Please fax to [email or web address]; I called them 11/26/16 and they requested the new order.  Face to face is in my 10/26/16 note.       * order for DME     2 each    Equipment being ordered: Cushioned heel boots.       oxyCODONE 5 MG IR tablet    ROXICODONE    30 tablet    Take 1 tablet (5 mg) by mouth every 4 hours as needed for moderate to severe pain       pantoprazole 40 MG EC tablet    PROTONIX    180 tablet    Take 1 tablet (40 mg) by mouth daily       senna-docusate 8.6-50 MG per tablet    SENOKOT-S;PERICOLACE    60 tablet    Take 1-2 tablets by mouth 2 times daily as needed for constipation       simethicone 80 MG chewable tablet    MYLICON    180 tablet    Take 1 tablet (80 mg) by mouth 4 times daily       tamsulosin 0.4 MG capsule    FLOMAX    90 capsule    Take 1 capsule (0.4 mg) by mouth daily       Vitamin B-1 100 MG Tabs     90 tablet    TAKE 1 TABLET (100 MG) BY MOUTH DAILY       warfarin 3 MG tablet    COUMADIN    180 tablet    Take 3mg by mouth on Mondays and Thursdays. Take 4.5mg by mouth all other days of the week.       * Notice:  This list has 9 medication(s) that are the same as other medications prescribed for you. Read the directions carefully, and ask your doctor or other care provider to  review them with you.

## 2017-04-11 NOTE — PATIENT INSTRUCTIONS
1.  I'm glad the simethicone is helping.  Please feel free to continue taking that up to 4 times a day as needed for bloating or gas.    2.  I removed magnesium hydroxide (Milk of Magnesia) from your medication list, since it didn't help, and may have added to your upset stomach.    3.  We made no medication changes today.    4.  Please remember to schedule the eye doctor appointment, as it is clear that his right eye is still bothering him.    5.  I'll be back to see you on Tuesday, May 9th at about 2 PM.

## 2017-04-14 ENCOUNTER — CARE COORDINATION (OUTPATIENT)
Dept: GERIATRIC MEDICINE | Facility: CLINIC | Age: 67
End: 2017-04-14

## 2017-04-14 NOTE — PROGRESS NOTES
AMANDA notified client changed to [facility] 04/01/2017. Welcome letter mailed 04/10/17. AMANDA spoke with client's daughter Lisa today ([phone]) regarding client's plan of care. No changes identified. Client continues to have skilled nurse visits from [facility]. Family is providing PCA services for client. Client had a visit with PCP on 4/11/17.  There has been a problem with getting a new CADI CM through [facility]. AMANDA has spoken to several people at [facility]. They continue to believe that because client is on [facility], he does not need a CADI CM. AMANDA contacted Nelli at [facility] on 4/7 and she stated client still is listed as being under [facility]. AMANDA has left two messages with Madison, the [facility] CM regarding this to see if she could help change to [facility]. She called back today and stated that as of 4/11/17 client is listed under [facility]. AMANDA asked Madison to call client's daughter Lisa and explain this to her.  Nena Salazar RN  [facility]   [phone]

## 2017-04-17 ENCOUNTER — ANTICOAGULATION THERAPY VISIT (OUTPATIENT)
Dept: ANTICOAGULATION | Facility: CLINIC | Age: 67
End: 2017-04-17

## 2017-04-17 DIAGNOSIS — Z95.811 LVAD (LEFT VENTRICULAR ASSIST DEVICE) PRESENT (H): ICD-10-CM

## 2017-04-17 DIAGNOSIS — Z79.01 LONG-TERM (CURRENT) USE OF ANTICOAGULANTS: ICD-10-CM

## 2017-04-17 LAB — INR POINT OF CARE: 2.5 (ref 0.86–1.14)

## 2017-04-17 NOTE — MR AVS SNAPSHOT
[facility]   4/17/2017   Anticoagulation Therapy Visit    Description:  67 year old male   Provider:  Amy Godinez, RN   Department:  [facility]           INR as of 4/17/2017     Today's INR 2.5      Anticoagulation Summary as of 4/17/2017     INR goal    Prior goal 1.8-2.5   Today's INR 2.5   Full instructions 4.5 mg on Sun, Tue, Thu; 3 mg all other days   Next INR check 4/21/2017    Indications   LVAD (left ventricular assist device) present [Z95.811]  Long-term (current) use of anticoagulants [Z79.01] [Z79.01]         April 2017 Details    Sun Mon Tue Wed Thu Fri Sat           1                 2               3               4               5               6               7               8                 9               10               11               12               13               14               15                 16               17      3 mg   See details      18      4.5 mg         19      3 mg         20      4.5 mg         21            22                 23               24               25               26               27               28               29                 30                      Date Details   04/17 This INR check       Date of next INR:  4/21/2017         How to take your warfarin dose     To take:  3 mg Take 1 of the 3 mg tablets.    To take:  4.5 mg Take 1.5 of the 3 mg tablets.

## 2017-04-17 NOTE — PROGRESS NOTES
ANTICOAGULATION FOLLOW-UP CLINIC VISIT    Patient Name:  Sohan Rendon  Date:  4/17/2017  Contact Type:  Telephone    SUBJECTIVE:     Patient Findings     Positives No Problem Findings           OBJECTIVE    INR Protime   Date Value Ref Range Status   04/17/2017 2.5 (A) 0.86 - 1.14 Final     Chromogenic Factor 10   Date Value Ref Range Status   08/10/2014 24 (L) 70 - 130 % Final     Comment:     Therapeutic Range:  A Chromogenic Factor 10 level of approximately 20-40%   inversely correlates with an INR of 2-3 for patients receiving Warfarin.   Chromogenic Factor 10 levels below 20% indicate an INR greater than 3 and   levels above 40% indicate an INR less than 2.       Factor 2 Assay   Date Value Ref Range Status   07/21/2014 25 (L) 60 - 140 % Final     Comment:     Analyte Specific Reagents (ASRs) are used in many laboratory tests necessary   for   standard medical care and generally do not require FDA approval.  This test   was   developed and its performance characteristics determined by [facility].  It has not been cleared or approved by   the US Food and Drug Administration.         ASSESSMENT / PLAN  INR assessment SUPRA    Recheck INR In: 4 DAYS    INR Location [facility]      Anticoagulation Summary as of 4/17/2017     INR goal    Prior goal 1.8-2.5   Today's INR 2.5   Maintenance plan 4.5 mg (3 mg x 1.5) on Sun, Tue, Thu; 3 mg (3 mg x 1) all other days   Full instructions 4.5 mg on Sun, Tue, Thu; 3 mg all other days   Weekly total 25.5 mg   No change documented Amy Godinez, SHAWN   Plan last modified Blanca Bah RN (2/27/2017)   Next INR check 4/21/2017   Priority INR   Target end date Indefinite    Indications   LVAD (left ventricular assist device) present [Z95.811]  Long-term (current) use of anticoagulants [Z79.01] [Z79.01]         Anticoagulation Episode Summary     INR check location     Preferred lab     Send IN[facility] reminders to [facility]    Comments Goal Range is 1.8-2.3  [facility] comes out to draw INR  Daughter Almaz Ph [phone]      Anticoagulation Care Providers     Provider Role Specialty Phone number    Evon Lemons MD Responsible Cardiology [phone]            See the Encounter Report to view Anticoagulation Flowsheet and Dosing Calendar (Go to Encounters tab in chart review, and find the Anticoagulation Therapy Visit)    Spoke with home care nurse Sofya.  Will check pt again in 4 days to ensure he does not continue to climb.     Amy Godinez RN

## 2017-04-20 DIAGNOSIS — I63.411 CEREBROVASCULAR ACCIDENT (CVA) DUE TO EMBOLISM OF RIGHT MIDDLE CEREBRAL ARTERY (H): ICD-10-CM

## 2017-04-20 RX ORDER — WARFARIN SODIUM 3 MG/1
TABLET ORAL
Qty: 180 TABLET | Refills: 3 | Status: ON HOLD | OUTPATIENT
Start: 2017-04-20 | End: 2017-07-02

## 2017-04-21 ENCOUNTER — TELEPHONE (OUTPATIENT)
Dept: CARDIOLOGY | Facility: CLINIC | Age: 67
End: 2017-04-21

## 2017-04-21 ENCOUNTER — ANTICOAGULATION THERAPY VISIT (OUTPATIENT)
Dept: ANTICOAGULATION | Facility: CLINIC | Age: 67
End: 2017-04-21

## 2017-04-21 DIAGNOSIS — Z79.01 LONG-TERM (CURRENT) USE OF ANTICOAGULANTS: ICD-10-CM

## 2017-04-21 DIAGNOSIS — Z95.811 LVAD (LEFT VENTRICULAR ASSIST DEVICE) PRESENT (H): ICD-10-CM

## 2017-04-21 LAB — INR PPP: 2.5

## 2017-04-21 NOTE — MR AVS SNAPSHOT
[facility]   4/21/2017   Anticoagulation Therapy Visit    Description:  67 year old male   Provider:  Blanca Bah, RN   Department:  [facility]           INR as of 4/21/2017     Today's INR 2.5      Anticoagulation Summary as of 4/21/2017     INR goal    Prior goal 1.8-2.5   Today's INR 2.5   Full instructions 4.5 mg on Sun, Tue, Thu; 3 mg all other days   Next INR check 4/28/2017    Indications   LVAD (left ventricular assist device) present [Z95.811]  Long-term (current) use of anticoagulants [Z79.01] [Z79.01]         April 2017 Details    Sun Mon Tue Wed Thu Fri Sat           1                 2               3               4               5               6               7               8                 9               10               11               12               13               14               15                 16               17               18               19               20               21      3 mg   See details      22      3 mg           23      4.5 mg         24      3 mg         25      4.5 mg         26      3 mg         27      4.5 mg         28            29                 30                      Date Details   04/21 This INR check       Date of next INR:  4/28/2017         How to take your warfarin dose     To take:  3 mg Take 1 of the 3 mg tablets.    To take:  4.5 mg Take 1.5 of the 3 mg tablets.

## 2017-04-21 NOTE — PROGRESS NOTES
ANTICOAGULATION FOLLOW-UP CLINIC VISIT    Patient Name:  Sohan Rendon  Date:  4/21/2017  Contact Type:  Telephone    SUBJECTIVE:     Patient Findings     Comments Encouraged Sohan to eat extra greens over the weekend.  Dose has been the same and stable for so long no dose adjustment.           OBJECTIVE    INR   Date Value Ref Range Status   04/21/2017 2.5  Final     Chromogenic Factor 10   Date Value Ref Range Status   08/10/2014 24 (L) 70 - 130 % Final     Comment:     Therapeutic Range:  A Chromogenic Factor 10 level of approximately 20-40%   inversely correlates with an INR of 2-3 for patients receiving Warfarin.   Chromogenic Factor 10 levels below 20% indicate an INR greater than 3 and   levels above 40% indicate an INR less than 2.       Factor 2 Assay   Date Value Ref Range Status   07/21/2014 25 (L) 60 - 140 % Final     Comment:     Analyte Specific Reagents (ASRs) are used in many laboratory tests necessary   for   standard medical care and generally do not require FDA approval.  This test   was   developed and its performance characteristics determined by [facility].  It has not been cleared or approved by   the US Food and Drug Administration.         ASSESSMENT / PLAN  INR assessment SUPRA    Recheck INR In: 1 WEEK    INR Location Clinic      Anticoagulation Summary as of 4/21/2017     INR goal    Prior goal 1.8-2.5   Today's INR 2.5   Maintenance plan 4.5 mg (3 mg x 1.5) on Sun, Tue, Thu; 3 mg (3 mg x 1) all other days   Full instructions 4.5 mg on Sun, Tue, Thu; 3 mg all other days   Weekly total 25.5 mg   Plan last modified Blanca Bah RN (2/27/2017)   Next INR check 4/28/2017   Priority INR   Target end date Indefinite    Indications   LVAD (left ventricular assist device) present [Z95.811]  Long-term (current) use of anticoagulants [Z79.01] [Z79.01]         Anticoagulation Episode Summary     INR check location     Preferred lab     Send  [facility]R reminders to [facility]    Comments Goal Range is 1.8-2.3  [facility] comes out to draw INR  Daughter Almaz Ph [phone]      Anticoagulation Care Providers     Provider Role Specialty Phone number    Evon Lemons MD Responsible Cardiology [phone]            See the Encounter Report to view Anticoagulation Flowsheet and Dosing Calendar (Go to Encounters tab in chart review, and find the Anticoagulation Therapy Visit)    Spoke with Sofya MCKINNON.    Blanca Bah RN

## 2017-04-22 ENCOUNTER — HOSPITAL ENCOUNTER (EMERGENCY)
Facility: CLINIC | Age: 67
Discharge: HOME | End: 2017-04-22
Attending: EMERGENCY MEDICINE | Admitting: EMERGENCY MEDICINE
Payer: COMMERCIAL

## 2017-04-22 VITALS
OXYGEN SATURATION: 99 % | RESPIRATION RATE: 16 BRPM | HEART RATE: 70 BPM | SYSTOLIC BLOOD PRESSURE: 92 MMHG | DIASTOLIC BLOOD PRESSURE: 78 MMHG | TEMPERATURE: 97.7 F

## 2017-04-22 DIAGNOSIS — T82.9XXA LEFT VENTRICULAR ASSIST DEVICE (LVAD) COMPLICATION, INITIAL ENCOUNTER: ICD-10-CM

## 2017-04-22 DIAGNOSIS — T82.9XXA: ICD-10-CM

## 2017-04-22 PROCEDURE — 99283 EMERGENCY DEPT VISIT LOW MDM: CPT | Performed by: EMERGENCY MEDICINE

## 2017-04-22 PROCEDURE — 99283 EMERGENCY DEPT VISIT LOW MDM: CPT | Mod: Z6 | Performed by: EMERGENCY MEDICINE

## 2017-04-22 ASSESSMENT — ENCOUNTER SYMPTOMS
NAUSEA: 0
SHORTNESS OF BREATH: 0
VOMITING: 0

## 2017-04-22 NOTE — ED PROVIDER NOTES
History     Chief Complaint   Patient presents with     LVAD beeping, no sx     HPI  Sohan Rendon is a 67 year old male with a medical history significant for CHF currently with destination LVAD, past right-sided MCA and left sided hemiparesis, dysphasia, esophagitis, GERD, and hypertension who presents to the emergency department with his daughters, both of whom report that the patient's LVAD has beeped 4 times in the last 48 hours. Patient's daughters report that the LVAD beeped yesterday evening around 1900 pm and 2045 pm, and twice more today around 1230 pm (1 hour ago). Patient denies chest pain, shortness of breath, nausea, or vomiting. Patient's daughters contacted the LVAD coordinator yesterday after hearing the beeping and were told to diligently monitor for any changes.   Social Here with 2 daughters    I have reviewed the Medications, Allergies, Past Medical and Surgical History, and Social History in the [employer] system.    PAST MEDICAL HISTORY:   Past Medical History:   Diagnosis Date     Acute right MCA stroke (H) 6/2013    With L sided hemiparesis      Anemia of chronic disease     Baseline Hb 8-9     BPH (benign prostatic hyperplasia)      CHF (congestive heart failure), NYHA class IV (H) 10/9/2012    s/p HeartMate II.  Was on milrinone and dobutamine prior to LVAD      CKD (chronic kidney disease)     Baseline Cr=     Clostridium difficile colitis 12/2012      Dysphagia     PEG tube placed in 2012.  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  Normal carotid duplex 3/2014.       Esophagitis 12/2012    EGD with esophagitis and multiple douenal ulcers     Fracture of femoral neck, right, closed 2/3/2015    Presumed pathologic as patient is non-weight-bearing and suffered no trauma.  Family declined operative repair during hospital stay 1/23 - 1/30/15.     Gastric and duodenal angiodysplasia with hemorrhage  6/18/2015     GERD (gastroesophageal reflux disease)      Gout      HTN (hypertension)     LDL=59 (3/29/2014)     Hyperlipaemia      Ischemic cardiomyopathy      Mitral regurgitation     s/p MVR with Carbomedics ring      Myocardial infarction (H) 1998    In [facility] without intervention      Nonsenile cataract     BE     PFO (patent foramen ovale)     s/p closure (10/9/2012)     TB lung, latent     s/p 9 months INH in 2012      PAST SURGICAL HISTORY:   Past Surgical History:   Procedure Laterality Date     C CABG, VEIN, FIVE  2001     CATARACT IOL, RT/LT Right 11/19/2015     ESOPHAGOSCOPY, GASTROSCOPY, DUODENOSCOPY (EGD), COMBINED N/A 6/10/2015    Procedure: COMBINED ESOPHAGOSCOPY, GASTROSCOPY, DUODENOSCOPY (EGD);  Surgeon: Edu Jenkins MD;  Location: [facility]     ESOPHAGOSCOPY, GASTROSCOPY, DUODENOSCOPY (EGD), COMBINED N/A 6/15/2015    Procedure: COMBINED ESOPHAGOSCOPY, GASTROSCOPY, DUODENOSCOPY (EGD);  Surgeon: Yury Bonner MD;  Location: [facility] OR     H INSERT ICD  2/10/2011    And pacemaker.  Not BiV     INSERT VENTRICULAR ASSIST DEVICE LEFT (HEARTMATE II)  10/9/2012     IR GASTRO JEJUNOSTOMY TUBE PLACEMENT       PHACOEMULSIFICATION CLEAR CORNEA WITH STANDARD INTRAOCULAR LENS IMPLANT Right 11/19/2015    Procedure: PHACOEMULSIFICATION CLEAR CORNEA WITH STANDARD INTRAOCULAR LENS IMPLANT;  Surgeon: Violeta Cosme MD;  Location: [facility] OR     REPAIR PATENT FORAMEN OVALE  10/9/2012     REPAIR VALVE MITRAL  2/9/2012    28 mm Carbomedics 2/3 ring        FAMILY HISTORY:   Family History   Problem Relation Age of Onset     Family History Negative No family hx of      Glaucoma No family hx of      Macular Degeneration No family hx of      CANCER No family hx of      no skin cancer       SOCIAL HISTORY:   Social History   Substance Use Topics     Smoking status: Former Smoker     Smokeless tobacco: Former User     Alcohol use No       Patient's Medications   New Prescriptions    No medications on file   Previous  Medications    ACETAMINOPHEN (TYLENOL) 325 MG TABLET    Take 2 tablets (650 mg) by mouth every 6 hours    ALLOPURINOL (ZYLOPRIM) 100 MG TABLET    Take 1 tablet (100 mg) by mouth daily    ASCORBIC ACID (VITAMIN C) 500 MG TABLET    Take 1 tablet (500 mg) by mouth daily With iron pill    ATORVASTATIN (LIPITOR) 10 MG TABLET    Take 1 tablet (10 mg) by mouth daily    AZELASTINE (OPTIVAR) 0.05 % SOLN    Apply 1 drop to eye 2 times daily    BISACODYL (DULCOLAX) 5 MG EC TABLET    Take 1 tablet (5 mg) by mouth daily as needed for constipation    BLOOD GLUCOSE MONITORING (NO BRAND SPECIFIED) LANCETS    Use to test blood sugars 2 times daily or as directed.    BLOOD GLUCOSE MONITORING (NO BRAND SPECIFIED) TEST STRIP    Use to test blood sugar 2 times daily or as directed.    CALCIUM CARBONATE-VITAMIN D 500-400 MG-UNIT TABS PER TABLET    Take 1 tablet by mouth daily    CARBOXYMETHYLCELLULOSE (REFRESH PLUS) 0.5 % SOLN    Place 1 drop into the right eye 3 times daily as needed for other (eye irritation or discomfort.)    FERROUS SULFATE (IRON) 325 (65 FE) MG TABLET    Take 1 tablet (325 mg) by mouth daily (with breakfast)    GUAIFENESIN-DEXTROMETHORPHAN (ROBITUSSIN DM) 100-10 MG/5ML SYRUP    Take 5 mLs by mouth every 4 hours as needed for cough    LEVETIRACETAM (KEPPRA) 750 MG TABLET    Take 1 tablet (750 mg) by mouth 2 times daily    LORATADINE (CLARITIN) 10 MG TABLET    Take 1 tablet (10 mg) by mouth daily    LOSARTAN (COZAAR) 25 MG TABLET    Take 1 tablet (25 mg) by mouth daily    MELATONIN 3 MG TABLET    Take 1 tablet (3 mg) by mouth At Bedtime    METOPROLOL (TOPROL-XL) 25 MG 24 HR TABLET    Take 1 tablet (25 mg) by mouth every evening    METOPROLOL (TOPROL-XL) 50 MG 24 HR TABLET    Take 1 tablet (50 mg) by mouth daily    NITROGLYCERIN (NITROSTAT) 0.4 MG SL TABLET    Place 1 tablet (0.4 mg) under the tongue every 5 minutes as needed for chest pain    NYSTATIN (MYCOSTATIN) 329211 UNIT/GM POWD    Apply to affected areas of skin  in the groin and on the scrotum three times a day as needed.    OLOPATADINE (PATANOL) 0.1 % OPHTHALMIC SOLUTION    Place 1 drop into both eyes 2 times daily    ORDER FOR DME    Equipment being ordered: Lift chair.    Please see indications and face-to-face encounter details in 2/3/15 Office Visit note.    ORDER FOR DME    Equipment being ordered: Bilateral leg supports for manual wheelchair.    ORDER FOR DME    Equipment being ordered: Reclining manual w/c with bilateral elevating leg rests.    ORDER FOR DME    Equipment being ordered: Hospital Bed, fully electric.    ORDER FOR DME    Equipment being ordered: Wheelchair, manual.    ORDER FOR DME    Equipment being ordered: Wheelchair, manual, with elevated leg rests and tilt in space back.  Please fax to [employer]; I called them 11/26/16 and they requested the new order.  Face to face is in my 10/26/16 note.    ORDER FOR DME    Equipment being ordered: Cushioned heel boots.    OXYCODONE (ROXICODONE) 5 MG IR TABLET    Take 1 tablet (5 mg) by mouth every 4 hours as needed for moderate to severe pain    PANTOPRAZOLE (PROTONIX) 40 MG ENTERIC COATED TABLET    Take 1 tablet (40 mg) by mouth daily    SENNA-DOCUSATE (SENOKOT-S;PERICOLACE) 8.6-50 MG PER TABLET    Take 1-2 tablets by mouth 2 times daily as needed for constipation    SIMETHICONE (MYLICON) 80 MG CHEWABLE TABLET    Take 1 tablet (80 mg) by mouth 4 times daily    TAMSULOSIN (FLOMAX) 0.4 MG 24 HR CAPSULE    Take 1 capsule (0.4 mg) by mouth daily    THIAMINE HCL (VITAMIN B-1) 100 MG TABS    TAKE 1 TABLET (100 MG) BY MOUTH DAILY    WARFARIN (COUMADIN) 3 MG TABLET    Take 3mg by mouth on Mondays and Thursdays. Take 4.5mg by mouth all other days of the week.   Modified Medications    No medications on file   Discontinued Medications    No medications on file          Allergies   Allergen Reactions     Seasonal Allergies        Review of Systems   Respiratory: Negative for shortness of breath.    Cardiovascular: Negative  for chest pain.   Gastrointestinal: Negative for nausea and vomiting.   All other systems reviewed and are negative.      Physical Exam    BP 99/82  Pulse 70  Temp 97.7  F (36.5  C) (Oral)  Resp 16  SpO2 99%   Physical Exam  Physical Exam   Constitutional:   well nourished, well developed, resting comfortably   HENT:   Head: Normocephalic and atraumatic.   Cardiovascular: LVAD hum  Pulmonary/Chest: Clear to auscultation bilaterally, with no wheezes or retractions. No respiratory distress.  GI: Soft with good bowel sounds.  Non-tender, non-distended, with no guarding, no rebound  Back:  No bony or CVA tenderness   Skin: Skin is warm and dry.   Neurological: alert and oriented to person, place, and time. Nonfocal exam  Psychiatric:  normal mood and affect.   ED Course     ED Course     Procedures       1:40 PM  The patient was seen and examined by Mallorie Camp MD in Room 9.          Critical Care time:  none               Labs Ordered and Resulted from Time of ED Arrival Up to the Time of Departure from the ED - No data to display    Assessments & Plan (with Medical Decision Making)       I have reviewed the nursing notes.  Emergency Department course:  The patient was seen and examined at 1341 pm.  The patient is well-appearing and has no symptoms of chest pain or difficulty breathing or shortness breath..  The LVAD is humming along nicely.  The LVAD was interrogated.  Apparently the extra battery in the controller is low and will need to be replaced.  This was done in the ED by the LVAD technician.  I did speak to Dr. Mann of cardiology 2 prior to discharging the patient.  The patient was discharged with his daughters.  He should follow up as previously arranged.    I have reviewed the findings, diagnosis, plan and need for follow up with the patient.    New Prescriptions    No medications on file       Final diagnoses:   Left ventricular assist device (LVAD) complication, initial encounter     Louie BERRIOS  Hermilo, am serving as a trained medical scribe to document services personally performed by Mallorie Camp MD, based on the provider's statements to me.   I, Mallorie Camp MD, was physically present and have reviewed and verified the accuracy of this note documented by Louie Akhtar.  This note was created in part by the use of Dragon voice recognition dictation system. Inadvertent grammatical errors and typographical errors may still exist.  Mallorie Camp MD    4/22/2017   [facility], [place], EMERGENCY DEPARTMENT     Mallorie Camp MD  04/22/17 1537

## 2017-04-22 NOTE — ED NOTES
"Sohan has had his LVAD for five years and has \"never had this beeping before. It has beeped 4 times since last evening and the LVAD coordinator knows.\" Patient and family deny other symptoms such as pain or SOB. Patient appears in NAD.  "

## 2017-04-22 NOTE — DISCHARGE INSTRUCTIONS
The LVAD battery was low and has been replaced. Follow up with your regular doctor and cardiologist as needed.

## 2017-04-22 NOTE — ED AVS SNAPSHOT
[facility], [place], Emergency Department    [address] [address]    Phone:  [phone]                                       Sohan Rendon   MRN: [id number]    Department:  [facility], Emergency Department   Date of Visit:  4/22/2017           After Visit Summary Signature Page     I have received my discharge instructions, and my questions have been answered. I have discussed any challenges I see with this plan with the nurse or doctor.    ..........................................................................................................................................  Patient/Patient Representative Signature      ..........................................................................................................................................  Patient Representative Print Name and Relationship to Patient    ..................................................               ................................................  Date                                            Time    ..........................................................................................................................................  Reviewed by Signature/Title    ...................................................              ..............................................  Date                                                            Time

## 2017-04-22 NOTE — ED NOTES
Bed: ED09  Expected date: 4/22/17  Expected time:   Means of arrival:   Comments:  Sohan Rendon  (MRN [id number])  Heartmate 2 beeping.  Family called EMS.  LVAD to see patient and interrogate device.

## 2017-04-22 NOTE — ED AVS SNAPSHOT
[facility], Emergency Department    [address] [address]    Phone:  [phone]                                       Sohan Rendon   MRN: [id number]    Department:  [facility], Emergency Department   Date of Visit:  4/22/2017           Patient Information     Date Of Birth          1950        Your diagnoses for this visit were:     Left ventricular assist device (LVAD) complication, initial encounter        You were seen by Mallorie Camp MD.        Discharge Instructions         The LVAD battery was low and has been replaced. Follow up with your regular doctor and cardiologist as needed.    Future Appointments        Provider Department Dept Phone Center    4/24/2017 9:15 AM Violeta Cosme MD; [facility] [phone] [facility]    5/9/2017 2:00 PM Thomas Dill MD [facility] [phone] [facility]    5/11/2017 10:00 AM  HEALTH IMAGING C[facility] ECHO ROOM 2  Health Echo [phone] [facility]    5/11/2017 11:00 AM Lab [facility] Lab [phone] [facility]    5/11/2017 11:30 AM UC CV DEVICE 1              [place] [phone] [facility]    5/11/2017 12:00 PM Maureen Grant NP [facility] [phone] [facility]    6/13/2017 2:00 PM Thomas Dill MD [facility] [phone] [facility]      24 Hour Appointment Hotline       To make an appointment at any [facility], call [phone] ([phone]). If you don't have a family doctor or clinic, we will help you find one. [facility] are conveniently located to serve the needs of you and your family.             Review of your medicines      Our records show that you are taking the medicines listed below. If these are incorrect, please call your family doctor or clinic.        Dose / Directions Last dose taken    acetaminophen 325 MG tablet   Commonly known as:  TYLENOL   Dose:  650 mg   Quantity:  100 tablet        Take 2 tablets (650 mg) by mouth every 6 hours    Refills:  0        allopurinol 100 MG tablet   Commonly known as:  ZYLOPRIM   Dose:  100 mg   Quantity:  90 tablet        Take 1 tablet (100 mg) by mouth daily   Refills:  3        ascorbic acid 500 MG tablet   Commonly known as:  VITAMIN C   Dose:  500 mg   Quantity:  90 tablet        Take 1 tablet (500 mg) by mouth daily With iron pill   Refills:  3        atorvastatin 10 MG tablet   Commonly known as:  LIPITOR   Dose:  10 mg   Quantity:  30 tablet        Take 1 tablet (10 mg) by mouth daily   Refills:  5        azelastine 0.05 % Soln ophthalmic solution   Commonly known as:  OPTIVAR   Dose:  1 drop   Quantity:  1 Bottle        Apply 1 drop to eye 2 times daily   Refills:  11        bisacodyl 5 MG EC tablet   Commonly known as:  DULCOLAX   Dose:  5 mg   Quantity:  20 tablet        Take 1 tablet (5 mg) by mouth daily as needed for constipation   Refills:  1        blood glucose monitoring lancets   Quantity:  1 Box        Use to test blood sugars 2 times daily or as directed.   Refills:  3        blood glucose monitoring test strip   Commonly known as:  no brand specified   Quantity:  1 Box        Use to test blood sugar 2 times daily or as directed.   Refills:  3        calcium carbonate-vitamin D 500-400 MG-UNIT Tabs per tablet   Dose:  1 tablet   Quantity:  90 tablet        Take 1 tablet by mouth daily   Refills:  3        carboxymethylcellulose 0.5 % Soln ophthalmic solution   Commonly known as:  REFRESH PLUS   Dose:  1 drop   Quantity:  30 mL        Place 1 drop into the right eye 3 times daily as needed for other (eye irritation or discomfort.)   Refills:  3        ferrous sulfate 325 (65 FE) MG tablet   Commonly known as:  IRON   Dose:  325 mg   Quantity:  90 tablet        Take 1 tablet (325 mg) by mouth daily (with breakfast)   Refills:  3        guaiFENesin-dextromethorphan 100-10 MG/5ML syrup   Commonly known as:  ROBITUSSIN DM   Dose:  5 mL   Quantity:  560 mL        Take 5 mLs by mouth every 4 hours  as needed for cough   Refills:  3        levETIRAcetam 750 MG tablet   Commonly known as:  KEPPRA   Dose:  750 mg   Quantity:  180 tablet        Take 1 tablet (750 mg) by mouth 2 times daily   Refills:  3        loratadine 10 MG tablet   Commonly known as:  CLARITIN   Dose:  10 mg   Quantity:  90 tablet        Take 1 tablet (10 mg) by mouth daily   Refills:  3        losartan 25 MG tablet   Commonly known as:  COZAAR   Dose:  25 mg   Quantity:  45 tablet        Take 1 tablet (25 mg) by mouth daily   Refills:  3        melatonin 3 MG tablet   Dose:  3 mg        Take 1 tablet (3 mg) by mouth At Bedtime   Refills:  0        * metoprolol 25 MG 24 hr tablet   Commonly known as:  TOPROL-XL   Dose:  25 mg   Quantity:  90 tablet        Take 1 tablet (25 mg) by mouth every evening   Refills:  3        * metoprolol 50 MG 24 hr tablet   Commonly known as:  TOPROL-XL   Dose:  50 mg   Quantity:  90 tablet        Take 1 tablet (50 mg) by mouth daily   Refills:  3        nitroglycerin 0.4 MG sublingual tablet   Commonly known as:  NITROSTAT   Dose:  0.4 mg   Quantity:  30 tablet        Place 1 tablet (0.4 mg) under the tongue every 5 minutes as needed for chest pain   Refills:  1        nystatin 588263 UNIT/GM Powd   Commonly known as:  MYCOSTATIN   Quantity:  60 g        Apply to affected areas of skin in the groin and on the scrotum three times a day as needed.   Refills:  3        olopatadine 0.1 % ophthalmic solution   Commonly known as:  PATANOL   Dose:  1 drop   Quantity:  1 Bottle        Place 1 drop into both eyes 2 times daily   Refills:  11        * order for DME   Quantity:  1 Device        Equipment being ordered: Lift chair.  Please see indications and face-to-face encounter details in 2/3/15 Office Visit note.   Refills:  0        * order for DME   Quantity:  2 each        Equipment being ordered: Bilateral leg supports for manual wheelchair.   Refills:  0        * order for DME   Quantity:  1 each        Equipment  being ordered: Reclining manual w/c with bilateral elevating leg rests.   Refills:  0        * order for DME   Quantity:  1 each        Equipment being ordered: Hospital Bed, fully electric.   Refills:  0        * order for DME   Quantity:  1 each        Equipment being ordered: Wheelchair, manual.   Refills:  0        * order for DME   Quantity:  1 each        Equipment being ordered: Wheelchair, manual, with elevated leg rests and tilt in space back.  Please fax to [facility]; I called them 11/26/16 and they requested the new order.  Face to face is in my 10/26/16 note.   Refills:  0        * order for DME   Quantity:  2 each        Equipment being ordered: Cushioned heel boots.   Refills:  0        oxyCODONE 5 MG IR tablet   Commonly known as:  ROXICODONE   Dose:  5 mg   Quantity:  30 tablet        Take 1 tablet (5 mg) by mouth every 4 hours as needed for moderate to severe pain   Refills:  0        pantoprazole 40 MG EC tablet   Commonly known as:  PROTONIX   Dose:  40 mg   Quantity:  180 tablet        Take 1 tablet (40 mg) by mouth daily   Refills:  3        senna-docusate 8.6-50 MG per tablet   Commonly known as:  SENOKOT-S;PERICOLACE   Dose:  1-2 tablet   Quantity:  60 tablet        Take 1-2 tablets by mouth 2 times daily as needed for constipation   Refills:  3        simethicone 80 MG chewable tablet   Commonly known as:  MYLICON   Dose:  80 mg   Quantity:  180 tablet        Take 1 tablet (80 mg) by mouth 4 times daily   Refills:  5        tamsulosin 0.4 MG capsule   Commonly known as:  FLOMAX   Dose:  0.4 mg   Quantity:  90 capsule        Take 1 capsule (0.4 mg) by mouth daily   Refills:  3        thiamine 100 MG tablet   Quantity:  90 tablet        TAKE 1 TABLET (100 MG) BY MOUTH DAILY   Refills:  3        warfarin 3 MG tablet   Commonly known as:  COUMADIN   Quantity:  180 tablet        Take 3mg by mouth on Mondays and Thursdays. Take 4.5mg by mouth all other days of the week.   Refills:  3        * Notice:  " This list has 9 medication(s) that are the same as other medications prescribed for you. Read the directions carefully, and ask your doctor or other care provider to review them with you.            Orders Needing Specimen Collection     None      Pending Results     No orders found from 2017 to 2017.            Pending Culture Results     No orders found from 2017 to 2017.            Thank you for choosing [facility]       Thank you for choosing [facility] for your care. Our goal is always to provide you with excellent care. Hearing back from our patients is one way we can continue to improve our services. Please take a few minutes to complete the written survey that you may receive in the mail after you visit with us. Thank you!        [employer] Information     [employer] lets you send messages to your doctor, view your test results, renew your prescriptions, schedule appointments and more. To sign up, go to [email or web address] . Click on \"Log in\" on the left side of the screen, which will take you to the Welcome page. Then click on \"Sign up Now\" on the right side of the page.     You will be asked to enter the access code listed below, as well as some personal information. Please follow the directions to create your username and password.     Your access code is: [email or web address]  Expires: 2017 12:00 PM     Your access code will  in 90 days. If you need help or a new code, please call your [facility] clinic or [phone].        Care EveryWhere ID     This is your Care EveryWhere ID. This could be used by other organizations to access your [facility] medical records  [id number]        After Visit Summary       This is your record. Keep this with you and show to your community pharmacist(s) and doctor(s) at your next visit.                  "

## 2017-04-22 NOTE — PROGRESS NOTES
D:  Pt's daughter called me to report that the controller continued to have intermittent beeping last night and this morning.  They were concerned and called 911 to bring him to the hospital.  I:  This writer interrogated the controller and discovered that he has been having alerts because of a low controller cell.  The patient had no spare controller cells in his bag, so I obtained 2 (1 for use and one for backup) and replaced the cell battery.  I also changed the dressing since they are low on supplies.  LVAD parameters:  Speed 8800, flow 3.3-3.7, PI 3.7, power 4.8.  A:  Stable with low controller cell battery, resolved.  P:  Follow up at usual clinic appointment on May 11.

## 2017-04-22 NOTE — TELEPHONE ENCOUNTER
D:  Daughter Almaz called to report that while with her father the LVAD controller beeped.  LVAD settings are all stable (speed 8800, flow 4.9, PI 4.1, power 4.9) and that it was a single alert.    I:  After reviewing with Almaz current settings and that there was no additional information on the patient module, we agreed that she would call again if the alarms repeated.  A:  Stable with single alert sound, cause unclear and without current alerts or evidence of malfunction.  P:  Monitor as above.

## 2017-04-25 ENCOUNTER — TELEPHONE (OUTPATIENT)
Dept: FAMILY MEDICINE | Facility: CLINIC | Age: 67
End: 2017-04-25

## 2017-04-25 NOTE — TELEPHONE ENCOUNTER
"See note below from \"LVAD Coordinator\" at ED:  Henry Morrell, RN Registered Nurse Signed MCS Coordinator Progress Notes   Date of Service: 4/22/2017  2:16 PM Note Created: 4/22/2017  2:16 PM     D: Pt's daughter called me to report that the controller continued to have intermittent beeping last night and this morning. They were concerned and called 911 to bring him to the hospital.  I: This writer interrogated the controller and discovered that he has been having alerts because of a low controller cell. The patient had no spare controller cells in his bag, so I obtained 2 (1 for use and one for backup) and replaced the cell battery. I also changed the dressing since they are low on supplies. LVAD parameters: Speed 8800, flow 3.3-3.7, PI 3.7, power 4.8.  A: Stable with low controller cell battery, resolved.  P: Follow up at usual clinic appointment on May 11    Writer spoke with pt's daughter, Almaz, and she said he is doing well. \"It was the LVAD battery and it's fine now\".    The patient is scheduled to see Dr. Dill on 5/9 and has a cardiology appt on 5/11.    Ekaterina Nielson RN          "

## 2017-04-25 NOTE — TELEPHONE ENCOUNTER
Patient was in the emergency room on 4/22/2017 for left ventricular assist device complication. Patient is scheudled to see Dr. Thomas Dill on 5/9/2017. Will route to nurse to follow up with patient.

## 2017-04-26 ENCOUNTER — CARE COORDINATION (OUTPATIENT)
Dept: GERIATRIC MEDICINE | Facility: CLINIC | Age: 67
End: 2017-04-26

## 2017-04-27 ENCOUNTER — TELEPHONE (OUTPATIENT)
Dept: FAMILY MEDICINE | Facility: CLINIC | Age: 67
End: 2017-04-27

## 2017-04-27 NOTE — PROGRESS NOTES
AMANDA has received several calls from the PCA agency regarding extended PCA time for client. AMANDA cannot authorize extended PCA because it is a waiver program and needs to be approved by the [facility] CM. Because there is confusion on [facility]  since client moved, there is no authorization for the extended PCA time. AMANDA again spoke with Madison, the [facility] CM. She told CM she reinstated client's case back to [facility] for now until [facility] takes the transfer so the PCA agency can submit to [facility] for payment on the extended PCA time. AMANDA called the PCA agency back and explained this to them. They were going to call Madison the [facility] CM and verify this with her.  Nena Salazar RN  [employer]   [phone]

## 2017-04-28 ENCOUNTER — ANTICOAGULATION THERAPY VISIT (OUTPATIENT)
Dept: ANTICOAGULATION | Facility: CLINIC | Age: 67
End: 2017-04-28

## 2017-04-28 DIAGNOSIS — Z95.811 LVAD (LEFT VENTRICULAR ASSIST DEVICE) PRESENT (H): ICD-10-CM

## 2017-04-28 DIAGNOSIS — Z79.01 LONG-TERM (CURRENT) USE OF ANTICOAGULANTS: ICD-10-CM

## 2017-04-28 LAB — INR PPP: 2.4

## 2017-04-28 NOTE — MR AVS SNAPSHOT
Sohan [facility]   4/28/2017   Anticoagulation Therapy Visit    Description:  67 year old male   Provider:  Dafne Sanders, RN   Department:  [facility]           INR as of 4/28/2017     Today's INR 2.4      Anticoagulation Summary as of 4/28/2017     INR goal    Prior goal 1.8-2.5   Today's INR 2.4   Full instructions 4.5 mg on Sun, Tue, Thu; 3 mg all other days   Next INR check 5/8/2017    Indications   LVAD (left ventricular assist device) present [Z95.811]  Long-term (current) use of anticoagulants [Z79.01] [Z79.01]         Description     Encouraged patient to eat one moderate extra serving of Vit rich food today.      April 2017 Details    Sun Mon Tue Wed Thu Fri Sat           1                 2               3               4               5               6               7               8                 9               10               11               12               13               14               15                 16               17               18               19               20               21               22                 23               24               25               26               27               28      3 mg   See details      29      3 mg           30      4.5 mg                Date Details   04/28 This INR check               How to take your warfarin dose     To take:  3 mg Take 1 of the 3 mg tablets.    To take:  4.5 mg Take 1.5 of the 3 mg tablets.           May 2017 Details    Sun Mon Tue Wed Thu Fri Sat      1      3 mg         2      4.5 mg         3      3 mg         4      4.5 mg         5      3 mg         6      3 mg           7      4.5 mg         8            9               10               11               12               13                 14               15               16               17               18               19               20                 21               22               23               24               25               26                27                 28               29               30               31                   Date Details   No additional details    Date of next INR:  5/8/2017         How to take your warfarin dose     To take:  3 mg Take 1 of the 3 mg tablets.    To take:  4.5 mg Take 1.5 of the 3 mg tablets.

## 2017-04-28 NOTE — PROGRESS NOTES
ANTICOAGULATION FOLLOW-UP CLINIC VISIT    Patient Name:  Sohan Rendon  Date:  4/28/2017  Contact Type:  Telephone    SUBJECTIVE:     Patient Findings     Positives No Problem Findings           OBJECTIVE    INR   Date Value Ref Range Status   04/28/2017 2.4  Final     Chromogenic Factor 10   Date Value Ref Range Status   08/10/2014 24 (L) 70 - 130 % Final     Comment:     Therapeutic Range:  A Chromogenic Factor 10 level of approximately 20-40%   inversely correlates with an INR of 2-3 for patients receiving Warfarin.   Chromogenic Factor 10 levels below 20% indicate an INR greater than 3 and   levels above 40% indicate an INR less than 2.       Factor 2 Assay   Date Value Ref Range Status   07/21/2014 25 (L) 60 - 140 % Final     Comment:     Analyte Specific Reagents (ASRs) are used in many laboratory tests necessary   for   standard medical care and generally do not require FDA approval.  This test   was   developed and its performance characteristics determined by [facility].  It has not been cleared or approved by   the US Food and Drug Administration.         ASSESSMENT / PLAN  INR assessment THER    Recheck INR In: 10 DAYS    INR Location [facility]      Anticoagulation Summary as of 4/28/2017     INR goal    Prior goal 1.8-2.5   Today's INR 2.4   Maintenance plan 4.5 mg (3 mg x 1.5) on Sun, Tue, Thu; 3 mg (3 mg x 1) all other days   Full instructions 4.5 mg on Sun, Tue, Thu; 3 mg all other days   Weekly total 25.5 mg   Plan last modified Blanca Bah RN (2/27/2017)   Next INR check 5/8/2017   Priority INR   Target end date Indefinite    Indications   LVAD (left ventricular assist device) present [Z95.811]  Long-term (current) use of anticoagulants [Z79.01] [Z79.01]         Anticoagulation Episode Summary     INR check location     Preferred lab     Send INR reminders to [facility]    Comments Goal Range is 1.8-2.3  [facility] comes out to  draw INR  Daughter Almaz Ph [phone]      Anticoagulation Care Providers     Provider Role Specialty Phone number    Evon Lemons MD Responsible Cardiology [phone]            See the Encounter Report to view Anticoagulation Flowsheet and Dosing Calendar (Go to Encounters tab in chart review, and find the Anticoagulation Therapy Visit)    Spoke with Sofya [facility] .    Dafne Sanders RN

## 2017-04-29 ENCOUNTER — HOSPITAL ENCOUNTER (EMERGENCY)
Facility: CLINIC | Age: 67
Discharge: HOME | End: 2017-04-29
Attending: EMERGENCY MEDICINE | Admitting: EMERGENCY MEDICINE
Payer: COMMERCIAL

## 2017-04-29 ENCOUNTER — APPOINTMENT (OUTPATIENT)
Dept: CT IMAGING | Facility: CLINIC | Age: 67
End: 2017-04-29
Attending: EMERGENCY MEDICINE
Payer: COMMERCIAL

## 2017-04-29 VITALS
TEMPERATURE: 97.7 F | SYSTOLIC BLOOD PRESSURE: 125 MMHG | WEIGHT: 180 LBS | RESPIRATION RATE: 16 BRPM | HEART RATE: 72 BPM | OXYGEN SATURATION: 98 % | DIASTOLIC BLOOD PRESSURE: 98 MMHG | HEIGHT: 68 IN | BODY MASS INDEX: 27.28 KG/M2

## 2017-04-29 DIAGNOSIS — K59.00 CONSTIPATION, UNSPECIFIED CONSTIPATION TYPE: ICD-10-CM

## 2017-04-29 DIAGNOSIS — R10.13 ABDOMINAL PAIN, EPIGASTRIC: ICD-10-CM

## 2017-04-29 LAB
ALBUMIN SERPL-MCNC: 3.5 G/DL (ref 3.4–5)
ALBUMIN UR-MCNC: 10 MG/DL
ALP SERPL-CCNC: 115 U/L (ref 40–150)
ALT SERPL W P-5'-P-CCNC: 15 U/L (ref 0–70)
ANION GAP SERPL CALCULATED.3IONS-SCNC: 10 MMOL/L (ref 3–14)
APPEARANCE UR: CLEAR
AST SERPL W P-5'-P-CCNC: 38 U/L (ref 0–45)
BASOPHILS # BLD AUTO: 0 10E9/L (ref 0–0.2)
BASOPHILS NFR BLD AUTO: 0.6 %
BILIRUB SERPL-MCNC: 0.7 MG/DL (ref 0.2–1.3)
BILIRUB UR QL STRIP: NEGATIVE
BUN SERPL-MCNC: 17 MG/DL (ref 7–30)
CALCIUM SERPL-MCNC: 8.6 MG/DL (ref 8.5–10.1)
CHLORIDE SERPL-SCNC: 109 MMOL/L (ref 94–109)
CO2 SERPL-SCNC: 22 MMOL/L (ref 20–32)
COLOR UR AUTO: YELLOW
CREAT SERPL-MCNC: 1.63 MG/DL (ref 0.66–1.25)
DIFFERENTIAL METHOD BLD: ABNORMAL
EOSINOPHIL # BLD AUTO: 0.5 10E9/L (ref 0–0.7)
EOSINOPHIL NFR BLD AUTO: 6.9 %
ERYTHROCYTE [DISTWIDTH] IN BLOOD BY AUTOMATED COUNT: 17 % (ref 10–15)
GFR SERPL CREATININE-BSD FRML MDRD: 43 ML/MIN/1.7M2
GLUCOSE SERPL-MCNC: 157 MG/DL (ref 70–99)
GLUCOSE UR STRIP-MCNC: NEGATIVE MG/DL
HCT VFR BLD AUTO: 42 % (ref 40–53)
HGB BLD-MCNC: 14.2 G/DL (ref 13.3–17.7)
HGB UR QL STRIP: ABNORMAL
HYALINE CASTS #/AREA URNS LPF: 1 /LPF (ref 0–2)
IMM GRANULOCYTES # BLD: 0.1 10E9/L (ref 0–0.4)
IMM GRANULOCYTES NFR BLD: 0.7 %
INR PPP: 2.24 (ref 0.86–1.14)
KETONES UR STRIP-MCNC: NEGATIVE MG/DL
LEUKOCYTE ESTERASE UR QL STRIP: NEGATIVE
LIPASE SERPL-CCNC: 100 U/L (ref 73–393)
LYMPHOCYTES # BLD AUTO: 0.8 10E9/L (ref 0.8–5.3)
LYMPHOCYTES NFR BLD AUTO: 10.7 %
MCH RBC QN AUTO: 32.3 PG (ref 26.5–33)
MCHC RBC AUTO-ENTMCNC: 33.8 G/DL (ref 31.5–36.5)
MCV RBC AUTO: 96 FL (ref 78–100)
MONOCYTES # BLD AUTO: 0.8 10E9/L (ref 0–1.3)
MONOCYTES NFR BLD AUTO: 10.7 %
MUCOUS THREADS #/AREA URNS LPF: PRESENT /LPF
NEUTROPHILS # BLD AUTO: 5.1 10E9/L (ref 1.6–8.3)
NEUTROPHILS NFR BLD AUTO: 70.4 %
NITRATE UR QL: NEGATIVE
NRBC # BLD AUTO: 0 10*3/UL
NRBC BLD AUTO-RTO: 0 /100
PH UR STRIP: 6 PH (ref 5–7)
PLATELET # BLD AUTO: 157 10E9/L (ref 150–450)
POTASSIUM SERPL-SCNC: 3.7 MMOL/L (ref 3.4–5.3)
PROT SERPL-MCNC: 8 G/DL (ref 6.8–8.8)
RBC # BLD AUTO: 4.39 10E12/L (ref 4.4–5.9)
RBC #/AREA URNS AUTO: 3 /HPF (ref 0–2)
RENAL EPI CELLS #/AREA URNS HPF: <1 /HPF
SODIUM SERPL-SCNC: 141 MMOL/L (ref 133–144)
SP GR UR STRIP: 1.01 (ref 1–1.03)
URN SPEC COLLECT METH UR: ABNORMAL
UROBILINOGEN UR STRIP-MCNC: NORMAL MG/DL (ref 0–2)
WBC # BLD AUTO: 7.3 10E9/L (ref 4–11)
WBC #/AREA URNS AUTO: 1 /HPF (ref 0–2)

## 2017-04-29 PROCEDURE — 99284 EMERGENCY DEPT VISIT MOD MDM: CPT | Mod: Z6 | Performed by: EMERGENCY MEDICINE

## 2017-04-29 PROCEDURE — 74176 CT ABD & PELVIS W/O CONTRAST: CPT

## 2017-04-29 PROCEDURE — 85610 PROTHROMBIN TIME: CPT | Performed by: EMERGENCY MEDICINE

## 2017-04-29 PROCEDURE — 96361 HYDRATE IV INFUSION ADD-ON: CPT | Performed by: EMERGENCY MEDICINE

## 2017-04-29 PROCEDURE — 80053 COMPREHEN METABOLIC PANEL: CPT | Performed by: EMERGENCY MEDICINE

## 2017-04-29 PROCEDURE — 85025 COMPLETE CBC W/AUTO DIFF WBC: CPT | Performed by: EMERGENCY MEDICINE

## 2017-04-29 PROCEDURE — 99285 EMERGENCY DEPT VISIT HI MDM: CPT | Mod: 25 | Performed by: EMERGENCY MEDICINE

## 2017-04-29 PROCEDURE — 25000128 H RX IP 250 OP 636: Performed by: EMERGENCY MEDICINE

## 2017-04-29 PROCEDURE — 40000141 ZZH STATISTIC PERIPHERAL IV START W/O US GUIDANCE

## 2017-04-29 PROCEDURE — 81001 URINALYSIS AUTO W/SCOPE: CPT | Performed by: EMERGENCY MEDICINE

## 2017-04-29 PROCEDURE — 40000802 ZZH SITE CHECK

## 2017-04-29 PROCEDURE — 36415 COLL VENOUS BLD VENIPUNCTURE: CPT

## 2017-04-29 PROCEDURE — 83690 ASSAY OF LIPASE: CPT | Performed by: EMERGENCY MEDICINE

## 2017-04-29 PROCEDURE — 96374 THER/PROPH/DIAG INJ IV PUSH: CPT | Performed by: EMERGENCY MEDICINE

## 2017-04-29 RX ORDER — ONDANSETRON 2 MG/ML
4 INJECTION INTRAMUSCULAR; INTRAVENOUS ONCE
Status: COMPLETED | OUTPATIENT
Start: 2017-04-29 | End: 2017-04-29

## 2017-04-29 RX ORDER — SODIUM CHLORIDE 9 MG/ML
1000 INJECTION, SOLUTION INTRAVENOUS CONTINUOUS
Status: DISCONTINUED | OUTPATIENT
Start: 2017-04-29 | End: 2017-04-29 | Stop reason: HOSPADM

## 2017-04-29 RX ADMIN — ONDANSETRON 4 MG: 2 INJECTION INTRAMUSCULAR; INTRAVENOUS at 07:41

## 2017-04-29 RX ADMIN — SODIUM CHLORIDE 1000 ML: 9 INJECTION, SOLUTION INTRAVENOUS at 07:42

## 2017-04-29 ASSESSMENT — ENCOUNTER SYMPTOMS
HEADACHES: 0
VOMITING: 1
ARTHRALGIAS: 0
CONFUSION: 0
NECK STIFFNESS: 0
DIFFICULTY URINATING: 0
SHORTNESS OF BREATH: 0
FEVER: 0
EYE REDNESS: 0
COLOR CHANGE: 0
CONSTIPATION: 1
ABDOMINAL PAIN: 1
NAUSEA: 1

## 2017-04-29 NOTE — ED AVS SNAPSHOT
[facility], [place], Emergency Department    [address] [place] [address]    Phone:  [phone]                                       Sohan Rendon   MRN: [id number]    Department:  [facility], Emergency Department   Date of Visit:  4/29/2017           After Visit Summary Signature Page     I have received my discharge instructions, and my questions have been answered. I have discussed any challenges I see with this plan with the nurse or doctor.    ..........................................................................................................................................  Patient/Patient Representative Signature      ..........................................................................................................................................  Patient Representative Print Name and Relationship to Patient    ..................................................               ................................................  Date                                            Time    ..........................................................................................................................................  Reviewed by Signature/Title    ...................................................              ..............................................  Date                                                            Time

## 2017-04-29 NOTE — ED PROVIDER NOTES
History     Chief Complaint   Patient presents with     Abdominal Pain     HPI  Sohan Rendon is a 67 year old male with below past medical history who emerges with abdominal pain, distention, and vomiting.  The patient had a small bowel movement yesterday.  He was given stool softeners and suppositories by his daughter with little result.  This morning, he developed abdominal distention, epigastric abdominal pain, and vomiting.  Patient has an LVAD.  His pump members have been within baseline.  Patient denies any chest pain or dyspnea.  No fever.  No cough.  No leg pain or swelling.  He complains of ongoing nausea.  The patient had an acute appendicitis.  2 months ago that was treated nonoperatively.    I have reviewed the Medications, Allergies, Past Medical and Surgical History, and Social History in the [employer] system.    Past Medical History:   Diagnosis Date     Acute right MCA stroke (H) 6/2013    With L sided hemiparesis      Anemia of chronic disease     Baseline Hb 8-9     BPH (benign prostatic hyperplasia)      CHF (congestive heart failure), NYHA class IV (H) 10/9/2012    s/p HeartMate II.  Was on milrinone and dobutamine prior to LVAD      CKD (chronic kidney disease)     Baseline Cr=     Clostridium difficile colitis 12/2012      Dysphagia     PEG tube placed in 2012.  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  Normal carotid duplex 3/2014.       Esophagitis 12/2012    EGD with esophagitis and multiple douenal ulcers     Fracture of femoral neck, right, closed 2/3/2015    Presumed pathologic as patient is non-weight-bearing and suffered no trauma.  Family declined operative repair during hospital stay 1/23 - 1/30/15.     Gastric and duodenal angiodysplasia with hemorrhage 6/18/2015     GERD (gastroesophageal reflux disease)      Gout      HTN (hypertension)     LDL=59 (3/29/2014)     Hyperlipaemia       "Ischemic cardiomyopathy      Mitral regurgitation     s/p MVR with Carbomedics ring      Myocardial infarction (H) 1998    In [facility] without intervention      Nonsenile cataract     BE     PFO (patent foramen ovale)     s/p closure (10/9/2012)     TB lung, latent     s/p 9 months INH in 2012         Review of Systems   Constitutional: Negative for fever.   HENT: Negative for congestion.    Eyes: Negative for redness.   Respiratory: Negative for shortness of breath.    Cardiovascular: Negative for chest pain.   Gastrointestinal: Positive for abdominal pain, constipation, nausea and vomiting.   Genitourinary: Negative for difficulty urinating.   Musculoskeletal: Negative for arthralgias and neck stiffness.   Skin: Negative for color change.   Neurological: Negative for headaches.   Psychiatric/Behavioral: Negative for confusion.   All other systems reviewed and are negative.      Physical Exam   BP: (!) 131/109  Pulse: 75  Temp: 97.7  F (36.5  C)  Resp: 18  Height: 172.7 cm (5' 8\")  Weight: 81.6 kg (180 lb)  SpO2: 95 %  Physical Exam   Constitutional: He is oriented to person, place, and time. He appears well-developed and well-nourished. No distress.   HENT:   Head: Normocephalic and atraumatic.   Eyes: Pupils are equal, round, and reactive to light. No scleral icterus.   Neck: Normal range of motion.   Cardiovascular: Normal rate, regular rhythm, normal heart sounds and intact distal pulses.    Pulmonary/Chest: Effort normal and breath sounds normal. No respiratory distress.   Abdominal: Soft. Bowel sounds are normal. He exhibits distension. There is tenderness in the epigastric area. There is no rebound and no guarding.   Musculoskeletal: Normal range of motion. He exhibits no edema or tenderness.   Neurological: He is alert and oriented to person, place, and time.   Skin: Skin is warm. No rash noted. He is not diaphoretic.   Nursing note and vitals reviewed.      ED Course     ED Course     Procedures      "       Critical Care time:    Results for orders placed or performed during the hospital encounter of 04/29/17   CT Abdomen Pelvis w/o Contrast    Narrative    EXAMINATION: CT ABDOMEN PELVIS W/O CONTRAST, 4/29/2017 10:20 AM    TECHNIQUE:  Helical CT images from the lung bases through the  symphysis pubis were obtained  without IV contrast.     COMPARISON: To/11/2017    HISTORY: Abdominal pain, distention?evaluate for obstruction    FINDINGS:  LVAD. Partially visualized cardiac implantable cardiac defibrillator  lead. Mitral annulus calcification with calcification in the left  ventricle likely involving the papillary muscle. Dependent atelectatic  changes in the lung bases, mild.    Limited evaluation of the upper abdomen demonstrates no focal  abnormality of the liver, adrenal glands, spleen. Upper abdomen  evaluation suffers from motion degradation as well as LVAD streak  artifact. Partial fatty replacement of the pancreas. Bilateral  exophytic renal lesions appear to represent cysts, though some of  indeterminate density. They are unchanged. Small nonobstructing right  renal calcifications appear similar to previous exam, though precise  characterization is limited due to motion. No hydronephrosis. No  ureteral stone. Vascular renal calcifications.    Scattered colonic diverticula. Nondilated appendix. Normal caliber of  the small bowel. Prostate and bladder are unremarkable.    Degenerative changes in the spine. Chronic right femoral neck fracture  with pseudoarthrosis.      Impression    IMPRESSION:   1. No acute intra-abdominal pathology identified. No bowel  obstruction.  2. Nonobstructing right renal stones appear similar to previous.     WALE AMBRIZ MD   CBC with platelets differential   Result Value Ref Range    WBC 7.3 4.0 - 11.0 10e9/L    RBC Count 4.39 (L) 4.4 - 5.9 10e12/L    Hemoglobin 14.2 13.3 - 17.7 g/dL    Hematocrit 42.0 40.0 - 53.0 %    MCV 96 78 - 100 fl    MCH 32.3 26.5 - 33.0 pg    MCHC  33.8 31.5 - 36.5 g/dL    RDW 17.0 (H) 10.0 - 15.0 %    Platelet Count 157 150 - 450 10e9/L    Diff Method Automated Method     % Neutrophils 70.4 %    % Lymphocytes 10.7 %    % Monocytes 10.7 %    % Eosinophils 6.9 %    % Basophils 0.6 %    % Immature Granulocytes 0.7 %    Nucleated RBCs 0 0 /100    Absolute Neutrophil 5.1 1.6 - 8.3 10e9/L    Absolute Lymphocytes 0.8 0.8 - 5.3 10e9/L    Absolute Monocytes 0.8 0.0 - 1.3 10e9/L    Absolute Eosinophils 0.5 0.0 - 0.7 10e9/L    Absolute Basophils 0.0 0.0 - 0.2 10e9/L    Abs Immature Granulocytes 0.1 0 - 0.4 10e9/L    Absolute Nucleated RBC 0.0    Comprehensive metabolic panel   Result Value Ref Range    Sodium 141 133 - 144 mmol/L    Potassium 3.7 3.4 - 5.3 mmol/L    Chloride 109 94 - 109 mmol/L    Carbon Dioxide 22 20 - 32 mmol/L    Anion Gap 10 3 - 14 mmol/L    Glucose 157 (H) 70 - 99 mg/dL    Urea Nitrogen 17 7 - 30 mg/dL    Creatinine 1.63 (H) 0.66 - 1.25 mg/dL    GFR Estimate 43 (L) >60 mL/min/1.7m2    GFR Estimate If Black 51 (L) >60 mL/min/1.7m2    Calcium 8.6 8.5 - 10.1 mg/dL    Bilirubin Total 0.7 0.2 - 1.3 mg/dL    Albumin 3.5 3.4 - 5.0 g/dL    Protein Total 8.0 6.8 - 8.8 g/dL    Alkaline Phosphatase 115 40 - 150 U/L    ALT 15 0 - 70 U/L    AST 38 0 - 45 U/L   Lipase   Result Value Ref Range    Lipase 100 73 - 393 U/L   UA with Microscopic reflex to Culture   Result Value Ref Range    Color Urine Yellow     Appearance Urine Clear     Glucose Urine Negative NEG mg/dL    Bilirubin Urine Negative NEG    Ketones Urine Negative NEG mg/dL    Specific Gravity Urine 1.007 1.003 - 1.035    Blood Urine Moderate (A) NEG    pH Urine 6.0 5.0 - 7.0 pH    Protein Albumin Urine 10 (A) NEG mg/dL    Urobilinogen mg/dL Normal 0.0 - 2.0 mg/dL    Nitrite Urine Negative NEG    Leukocyte Esterase Urine Negative NEG    Source Midstream Urine     WBC Urine 1 0 - 2 /HPF    RBC Urine 3 (H) 0 - 2 /HPF    Renal Tub Epi <1 (A) NEG /HPF    Mucous Urine Present (A) NEG /LPF    Hyaline Casts 1  0 - 2 /LPF   INR   Result Value Ref Range    INR 2.24 (H) 0.86 - 1.14     *Note: Due to a large number of results and/or encounters for the requested time period, some results have not been displayed. A complete set of results can be found in Results Review.     Medications   0.9% sodium chloride infusion (0 mLs Intravenous Stopped 4/29/17 1103)   iohexol (OMNIPAQUE) solution 25 mL (25 mLs Oral Given 4/29/17 0800)   ondansetron (ZOFRAN) injection 4 mg (4 mg Intravenous Given 4/29/17 0741)            Assessments & Plan (with Medical Decision Making)   67 year old male with history of LVAD and COPD who presents to the emergency department with abdominal distention and vomiting.  He has recently been constipated.  Patient also has some epigastric pain and mild tenderness on exam without peritoneal signs.  The patient's labs are at their baseline.  His urine does not appear infected.  His abdominal CT does not show obstruction or other acute pathology.  The patient's symptoms were controlled with the above therapies.  The patient's bowel regimen was discussed in detail with him and his family.  He has not wished to take Miralax.  Fiber supplementation has been recommended as a result.  Primary care follow-up next week.    I have reviewed the nursing notes.    I have reviewed the findings, diagnosis, plan and need for follow up with the patient.    Discharge Medication List as of 4/29/2017 11:50 AM          Final diagnoses:   Abdominal pain, epigastric   Constipation, unspecified constipation type       4/29/2017   [facility], [place], EMERGENCY DEPARTMENT     Colt May MD  04/29/17 1959

## 2017-04-29 NOTE — ED AVS SNAPSHOT
[facility], Emergency Department    [address] [facility] [address]    Phone:  [phone]                                       Sohan Rendon   MRN: [id number]    Department:  [facility], Emergency Department   Date of Visit:  4/29/2017           Patient Information     Date Of Birth          1950        Your diagnoses for this visit were:     Abdominal pain, epigastric     Constipation, unspecified constipation type        You were seen by Colt May MD.        Discharge Instructions         Constipation (Adult)  Constipation means that you have bowel movements that are less frequent than usual. Stools often become very hard and difficult to pass.  Constipation is very common. At some point in life it affects almost everyone. Since everyone's bowel habits are different, what is constipation to one person may not be to another. Your healthcare provider may do tests to diagnose constipation. It depends on what he or she finds when evaluating you.    Symptoms of constipation include:    Abdominal pain    Bloating    Vomiting    Painful bowel movements    Itching, swelling, bleeding, or pain around the anus  Causes  Constipation can have many causes. These include:    Diet low in fiber    Too much dairy    Not drinking enough liquids    Lack of exercise or physical activity. This is especially true for older adults.    Changes in lifestyle or daily routine, including pregnancy, aging, work, and travel    Frequent use or misuse of laxatives    Ignoring the urge to have a bowel movement or delaying it until later    Medicines, such as certain prescription pain medicines, iron supplements, antacids, certain antidepressants, and calcium supplements    Diseases like irritable bowel syndrome, bowel obstructions, stroke, diabetes, thyroid disease, Parkinson disease, hemorrhoids, and colon cancer  Complications  Potential complications of constipation can include:    Hemorrhoids    Rectal bleeding from  hemorrhoids or anal fissures (skin tears)    Hernias    Dependency on laxatives    Chronic constipation    Fecal impaction    Bowel obstruction or perforation  Home care  All treatment should be done after talking with your healthcare provider. This is especially true if you have another medical problems, are taking prescription medicines, or are an older adult. Treatment most often involves lifestyle changes. You may also need medicines. Your healthcare provider will tell you which will work best for you. Follow the advice below to help avoid this problem in the future.  Lifestyle changes  These lifestyle changes can help prevent constipation:    Diet. Eat a high-fiber diet, with fresh fruit and vegetables, and reduce dairy intake, meats, and processed foods    Fluids. It's important to get enough fluids each day. Drink plenty of water when you eat more fiber. If you are on diet that limits the amount of fluid you can have, talk about this with your healthcare provider.    Regular exercise. Check with your healthcare provider first.  Medications  Take any medicines as directed. Some laxatives are safe to use only every now and then. Others can be taken on a regular basis. Talk with your doctor or pharmacist if you have questions.  Prescription pain medicines can cause constipation. If you are taking this kind of medicine, ask your healthcare provider if you should also take a stool softener.  Medicines you may take to treat constipation include:    Fiber supplements    Stool softeners    Laxatives    Enemas    Rectal suppositories  Follow-up care  Follow up with your healthcare provider if symptoms don't get better in the next few days. You may need to have more tests or see a specialist.  Call 911  Call 911 if any of these occur:    Trouble breathing    Stiff, rigid abdomen that is severely painful to touch    Confusion    Fainting or loss of consciousness    Rapid heart rate    Chest pain  When to seek medical  advice  Call your healthcare provider right away if any of these occur:    Fever over 100.4 F (38 C)    Failure to resume normal bowel movements    Pain in your abdomen or back gets worse    Nausea or vomiting    Swelling in your abdomen    Blood in the stool    Black, tarry stool    Involuntary weight loss    Weakness    5235-9065 [employer]. [address] [place] [address]. All rights reserved. This information is not intended as a substitute for professional medical care. Always follow your healthcare professional's instructions.      Add fiber to your bowel regimen (dietary or Metamucil or Citrucel).    Please make an appointment to follow up with Your Primary Care Provider next week.     Future Appointments        Provider Department Dept Phone Center    5/1/2017 9:30 AM Violeta Cosme MD; [facility] LANGUAGE SERVICES [facility] [phone] [facility] CLIN    5/9/2017 2:00 PM Thomas Dill MD [facility] [phone] [facility]    5/12/2017 11:00 AM Lab [facility] Lab [phone] [facility]    5/12/2017 11:30 AM [facility] IMAGING CVC ECHO ROOM 1 [facility] Echo [phone] [facility]    5/12/2017 1:00 PM CLAIRE Escobar [facility] [phone] [facility]    5/12/2017 1:00 PM UC CV DEVICE 1              [facility] [phone] [facility]    6/13/2017 2:00 PM Thomas Dill MD [facility] [phone] [facility]      24 Hour Appointment Hotline       To make an appointment at any [facility], call [phone] ([phone]). If you don't have a family doctor or clinic, we will help you find one. [facility] are conveniently located to serve the needs of you and your family.             Review of your medicines      Our records show that you are taking the medicines listed below. If these are incorrect, please call your family doctor or clinic.        Dose / Directions Last dose taken    acetaminophen 325 MG tablet   Commonly  known as:  TYLENOL   Dose:  650 mg   Quantity:  100 tablet        Take 2 tablets (650 mg) by mouth every 6 hours   Refills:  0        allopurinol 100 MG tablet   Commonly known as:  ZYLOPRIM   Dose:  100 mg   Quantity:  90 tablet        Take 1 tablet (100 mg) by mouth daily   Refills:  3        ascorbic acid 500 MG tablet   Commonly known as:  VITAMIN C   Dose:  500 mg   Quantity:  90 tablet        Take 1 tablet (500 mg) by mouth daily With iron pill   Refills:  3        atorvastatin 10 MG tablet   Commonly known as:  LIPITOR   Dose:  10 mg   Quantity:  30 tablet        Take 1 tablet (10 mg) by mouth daily   Refills:  5        azelastine 0.05 % Soln ophthalmic solution   Commonly known as:  OPTIVAR   Dose:  1 drop   Quantity:  1 Bottle        Apply 1 drop to eye 2 times daily   Refills:  11        bisacodyl 5 MG EC tablet   Commonly known as:  DULCOLAX   Dose:  5 mg   Quantity:  20 tablet        Take 1 tablet (5 mg) by mouth daily as needed for constipation   Refills:  1        blood glucose monitoring lancets   Quantity:  1 Box        Use to test blood sugars 2 times daily or as directed.   Refills:  3        blood glucose monitoring test strip   Commonly known as:  no brand specified   Quantity:  1 Box        Use to test blood sugar 2 times daily or as directed.   Refills:  3        calcium carbonate-vitamin D 500-400 MG-UNIT Tabs per tablet   Dose:  1 tablet   Quantity:  90 tablet        Take 1 tablet by mouth daily   Refills:  3        carboxymethylcellulose 0.5 % Soln ophthalmic solution   Commonly known as:  REFRESH PLUS   Dose:  1 drop   Quantity:  30 mL        Place 1 drop into the right eye 3 times daily as needed for other (eye irritation or discomfort.)   Refills:  3        ferrous sulfate 325 (65 FE) MG tablet   Commonly known as:  IRON   Dose:  325 mg   Quantity:  90 tablet        Take 1 tablet (325 mg) by mouth daily (with breakfast)   Refills:  3        guaiFENesin-dextromethorphan 100-10 MG/5ML syrup    Commonly known as:  ROBITUSSIN DM   Dose:  5 mL   Quantity:  560 mL        Take 5 mLs by mouth every 4 hours as needed for cough   Refills:  3        levETIRAcetam 750 MG tablet   Commonly known as:  KEPPRA   Dose:  750 mg   Quantity:  180 tablet        Take 1 tablet (750 mg) by mouth 2 times daily   Refills:  3        loratadine 10 MG tablet   Commonly known as:  CLARITIN   Dose:  10 mg   Quantity:  90 tablet        Take 1 tablet (10 mg) by mouth daily   Refills:  3        losartan 25 MG tablet   Commonly known as:  COZAAR   Dose:  25 mg   Quantity:  45 tablet        Take 1 tablet (25 mg) by mouth daily   Refills:  3        melatonin 3 MG tablet   Dose:  3 mg        Take 1 tablet (3 mg) by mouth At Bedtime   Refills:  0        * metoprolol 25 MG 24 hr tablet   Commonly known as:  TOPROL-XL   Dose:  25 mg   Quantity:  90 tablet        Take 1 tablet (25 mg) by mouth every evening   Refills:  3        * metoprolol 50 MG 24 hr tablet   Commonly known as:  TOPROL-XL   Dose:  50 mg   Quantity:  90 tablet        Take 1 tablet (50 mg) by mouth daily   Refills:  3        nitroglycerin 0.4 MG sublingual tablet   Commonly known as:  NITROSTAT   Dose:  0.4 mg   Quantity:  30 tablet        Place 1 tablet (0.4 mg) under the tongue every 5 minutes as needed for chest pain   Refills:  1        nystatin 111837 UNIT/GM Powd   Commonly known as:  MYCOSTATIN   Quantity:  60 g        Apply to affected areas of skin in the groin and on the scrotum three times a day as needed.   Refills:  3        olopatadine 0.1 % ophthalmic solution   Commonly known as:  PATANOL   Dose:  1 drop   Quantity:  1 Bottle        Place 1 drop into both eyes 2 times daily   Refills:  11        * order for DME   Quantity:  1 Device        Equipment being ordered: Lift chair.  Please see indications and face-to-face encounter details in 2/3/15 Office Visit note.   Refills:  0        * order for DME   Quantity:  2 each        Equipment being ordered: Bilateral  leg supports for manual wheelchair.   Refills:  0        * order for DME   Quantity:  1 each        Equipment being ordered: Reclining manual w/c with bilateral elevating leg rests.   Refills:  0        * order for DME   Quantity:  1 each        Equipment being ordered: Hospital Bed, fully electric.   Refills:  0        * order for DME   Quantity:  1 each        Equipment being ordered: Wheelchair, manual.   Refills:  0        * order for DME   Quantity:  1 each        Equipment being ordered: Wheelchair, manual, with elevated leg rests and tilt in space back.  Please fax to [facility]; I called them 11/26/16 and they requested the new order.  Face to face is in my 10/26/16 note.   Refills:  0        * order for DME   Quantity:  2 each        Equipment being ordered: Cushioned heel boots.   Refills:  0        oxyCODONE 5 MG IR tablet   Commonly known as:  ROXICODONE   Dose:  5 mg   Quantity:  30 tablet        Take 1 tablet (5 mg) by mouth every 4 hours as needed for moderate to severe pain   Refills:  0        pantoprazole 40 MG EC tablet   Commonly known as:  PROTONIX   Dose:  40 mg   Quantity:  180 tablet        Take 1 tablet (40 mg) by mouth daily   Refills:  3        senna-docusate 8.6-50 MG per tablet   Commonly known as:  SENOKOT-S;PERICOLACE   Dose:  1-2 tablet   Quantity:  60 tablet        Take 1-2 tablets by mouth 2 times daily as needed for constipation   Refills:  3        simethicone 80 MG chewable tablet   Commonly known as:  MYLICON   Dose:  80 mg   Quantity:  180 tablet        Take 1 tablet (80 mg) by mouth 4 times daily   Refills:  5        tamsulosin 0.4 MG capsule   Commonly known as:  FLOMAX   Dose:  0.4 mg   Quantity:  90 capsule        Take 1 capsule (0.4 mg) by mouth daily   Refills:  3        thiamine 100 MG tablet   Quantity:  90 tablet        TAKE 1 TABLET (100 MG) BY MOUTH DAILY   Refills:  3        warfarin 3 MG tablet   Commonly known as:  COUMADIN   Quantity:  180 tablet        Take 3mg by  "mouth on  and . Take 4.5mg by mouth all other days of the week.   Refills:  3        * Notice:  This list has 9 medication(s) that are the same as other medications prescribed for you. Read the directions carefully, and ask your doctor or other care provider to review them with you.            Procedures and tests performed during your visit     CBC with platelets differential    CT Abdomen Pelvis w/o Contrast    Comprehensive metabolic panel    INR    Lipase    UA with Microscopic reflex to Culture    [facility] Adult IP Consult      Orders Needing Specimen Collection     None      Pending Results     No orders found from 2017 to 2017.            Pending Culture Results     No orders found from 2017 to 2017.            Thank you for choosing [facility]       Thank you for choosing [facility] for your care. Our goal is always to provide you with excellent care. Hearing back from our patients is one way we can continue to improve our services. Please take a few minutes to complete the written survey that you may receive in the mail after you visit with us. Thank you!        [employer] Information     [employer] [employer] lets you send messages to your doctor, view your test results, renew your prescriptions, schedule appointments and more. To sign up, go to [email or web address] . Click on \"Log in\" on the left side of the screen, which will take you to the Welcome page. Then click on \"Sign up Now\" on the right side of the page.     You will be asked to enter the access code listed below, as well as some personal information. Please follow the directions to create your username and password.     Your access code is: [email or web address]  Expires: 2017 12:00 PM     Your access code will  in 90 days. If you need help or a new code, please call your [facility] clinic or [phone].        Care EveryWhere ID     This is your Care EveryWhere ID. This could be used by other organizations to " access your [facility] medical records  [id number]        After Visit Summary       This is your record. Keep this with you and show to your community pharmacist(s) and doctor(s) at your next visit.

## 2017-04-29 NOTE — ED NOTES
Pt BIBA after having some abdominal pain and some bloating. Daughter says his last bowel movement was two days prior. Daughter gave him a suppository at 0500 this AM, with some small smears. Pt had some nausea/vomiting at 0500 this AM as well. LVAD. Abdomen distended. Daughter at bedside. /109.

## 2017-04-29 NOTE — DISCHARGE INSTRUCTIONS

## 2017-05-02 ENCOUNTER — ANTICOAGULATION THERAPY VISIT (OUTPATIENT)
Dept: ANTICOAGULATION | Facility: CLINIC | Age: 67
End: 2017-05-02

## 2017-05-02 ENCOUNTER — TELEPHONE (OUTPATIENT)
Dept: FAMILY MEDICINE | Facility: CLINIC | Age: 67
End: 2017-05-02

## 2017-05-02 ENCOUNTER — HOSPITAL ENCOUNTER (INPATIENT)
Facility: CLINIC | Age: 67
LOS: 11 days | Discharge: HOME HEALTH | DRG: 408 | End: 2017-05-15
Attending: EMERGENCY MEDICINE | Admitting: INTERNAL MEDICINE
Payer: COMMERCIAL

## 2017-05-02 ENCOUNTER — HOSPITAL ENCOUNTER (EMERGENCY)
Facility: CLINIC | Age: 67
Discharge: HOME | DRG: 408 | End: 2017-05-02
Attending: EMERGENCY MEDICINE | Admitting: EMERGENCY MEDICINE
Payer: COMMERCIAL

## 2017-05-02 VITALS
HEART RATE: 71 BPM | WEIGHT: 180 LBS | BODY MASS INDEX: 27.28 KG/M2 | OXYGEN SATURATION: 97 % | RESPIRATION RATE: 18 BRPM | TEMPERATURE: 99.1 F | HEIGHT: 68 IN | SYSTOLIC BLOOD PRESSURE: 154 MMHG | DIASTOLIC BLOOD PRESSURE: 111 MMHG

## 2017-05-02 DIAGNOSIS — K81.0 ACUTE CHOLECYSTITIS: ICD-10-CM

## 2017-05-02 DIAGNOSIS — R10.11 ABDOMINAL PAIN, RIGHT UPPER QUADRANT: ICD-10-CM

## 2017-05-02 DIAGNOSIS — Z95.811 LVAD (LEFT VENTRICULAR ASSIST DEVICE) PRESENT (H): ICD-10-CM

## 2017-05-02 DIAGNOSIS — R11.10 VOMITING AND DIARRHEA: ICD-10-CM

## 2017-05-02 DIAGNOSIS — R33.9 INCOMPLETE BLADDER EMPTYING: Primary | ICD-10-CM

## 2017-05-02 DIAGNOSIS — R19.7 DIARRHEA, UNSPECIFIED TYPE: ICD-10-CM

## 2017-05-02 DIAGNOSIS — R10.13 ABDOMINAL PAIN, EPIGASTRIC: ICD-10-CM

## 2017-05-02 DIAGNOSIS — R19.7 VOMITING AND DIARRHEA: ICD-10-CM

## 2017-05-02 DIAGNOSIS — K59.03 DRUG-INDUCED CONSTIPATION: ICD-10-CM

## 2017-05-02 DIAGNOSIS — Z79.01 LONG-TERM (CURRENT) USE OF ANTICOAGULANTS: ICD-10-CM

## 2017-05-02 DIAGNOSIS — R14.0 ABDOMINAL DISTENTION: ICD-10-CM

## 2017-05-02 LAB
ALBUMIN SERPL-MCNC: 3.2 G/DL (ref 3.4–5)
ALBUMIN SERPL-MCNC: 3.3 G/DL (ref 3.4–5)
ALP SERPL-CCNC: 105 U/L (ref 40–150)
ALP SERPL-CCNC: 105 U/L (ref 40–150)
ALT SERPL W P-5'-P-CCNC: 10 U/L (ref 0–70)
ALT SERPL W P-5'-P-CCNC: 10 U/L (ref 0–70)
ANION GAP SERPL CALCULATED.3IONS-SCNC: 9 MMOL/L (ref 3–14)
ANION GAP SERPL CALCULATED.3IONS-SCNC: 9 MMOL/L (ref 3–14)
AST SERPL W P-5'-P-CCNC: 27 U/L (ref 0–45)
AST SERPL W P-5'-P-CCNC: 33 U/L (ref 0–45)
BASOPHILS # BLD AUTO: 0 10E9/L (ref 0–0.2)
BASOPHILS # BLD AUTO: 0 10E9/L (ref 0–0.2)
BASOPHILS NFR BLD AUTO: 0.2 %
BASOPHILS NFR BLD AUTO: 0.4 %
BILIRUB SERPL-MCNC: 0.7 MG/DL (ref 0.2–1.3)
BILIRUB SERPL-MCNC: 0.9 MG/DL (ref 0.2–1.3)
BUN SERPL-MCNC: 15 MG/DL (ref 7–30)
BUN SERPL-MCNC: 16 MG/DL (ref 7–30)
CALCIUM SERPL-MCNC: 8.5 MG/DL (ref 8.5–10.1)
CALCIUM SERPL-MCNC: 8.6 MG/DL (ref 8.5–10.1)
CHLORIDE SERPL-SCNC: 105 MMOL/L (ref 94–109)
CHLORIDE SERPL-SCNC: 106 MMOL/L (ref 94–109)
CO2 BLDCOV-SCNC: 19 MMOL/L (ref 21–28)
CO2 SERPL-SCNC: 20 MMOL/L (ref 20–32)
CO2 SERPL-SCNC: 23 MMOL/L (ref 20–32)
CREAT SERPL-MCNC: 1.57 MG/DL (ref 0.66–1.25)
CREAT SERPL-MCNC: 1.78 MG/DL (ref 0.66–1.25)
CRP SERPL-MCNC: 91 MG/L (ref 0–8)
DIFFERENTIAL METHOD BLD: ABNORMAL
DIFFERENTIAL METHOD BLD: ABNORMAL
EOSINOPHIL # BLD AUTO: 0.1 10E9/L (ref 0–0.7)
EOSINOPHIL # BLD AUTO: 0.6 10E9/L (ref 0–0.7)
EOSINOPHIL NFR BLD AUTO: 0.3 %
EOSINOPHIL NFR BLD AUTO: 6.3 %
ERYTHROCYTE [DISTWIDTH] IN BLOOD BY AUTOMATED COUNT: 16.5 % (ref 10–15)
ERYTHROCYTE [DISTWIDTH] IN BLOOD BY AUTOMATED COUNT: 16.9 % (ref 10–15)
GFR SERPL CREATININE-BSD FRML MDRD: 38 ML/MIN/1.7M2
GFR SERPL CREATININE-BSD FRML MDRD: 44 ML/MIN/1.7M2
GLUCOSE SERPL-MCNC: 188 MG/DL (ref 70–99)
GLUCOSE SERPL-MCNC: 208 MG/DL (ref 70–99)
HCT VFR BLD AUTO: 41.1 % (ref 40–53)
HCT VFR BLD AUTO: 41.3 % (ref 40–53)
HGB BLD-MCNC: 13.7 G/DL (ref 13.3–17.7)
HGB BLD-MCNC: 13.7 G/DL (ref 13.3–17.7)
IMM GRANULOCYTES # BLD: 0 10E9/L (ref 0–0.4)
IMM GRANULOCYTES # BLD: 0.1 10E9/L (ref 0–0.4)
IMM GRANULOCYTES NFR BLD: 0.3 %
IMM GRANULOCYTES NFR BLD: 0.4 %
INR PPP: 2.51 (ref 0.86–1.14)
INR PPP: 2.93 (ref 0.86–1.14)
LACTATE BLD-SCNC: 1.5 MMOL/L (ref 0.7–2.1)
LACTATE BLD-SCNC: 1.8 MMOL/L (ref 0.7–2.1)
LIPASE SERPL-CCNC: 53 U/L (ref 73–393)
LIPASE SERPL-CCNC: 78 U/L (ref 73–393)
LYMPHOCYTES # BLD AUTO: 0.6 10E9/L (ref 0.8–5.3)
LYMPHOCYTES # BLD AUTO: 0.9 10E9/L (ref 0.8–5.3)
LYMPHOCYTES NFR BLD AUTO: 10.1 %
LYMPHOCYTES NFR BLD AUTO: 4 %
MCH RBC QN AUTO: 31.6 PG (ref 26.5–33)
MCH RBC QN AUTO: 32.1 PG (ref 26.5–33)
MCHC RBC AUTO-ENTMCNC: 33.2 G/DL (ref 31.5–36.5)
MCHC RBC AUTO-ENTMCNC: 33.3 G/DL (ref 31.5–36.5)
MCV RBC AUTO: 95 FL (ref 78–100)
MCV RBC AUTO: 96 FL (ref 78–100)
MONOCYTES # BLD AUTO: 1.3 10E9/L (ref 0–1.3)
MONOCYTES # BLD AUTO: 1.9 10E9/L (ref 0–1.3)
MONOCYTES NFR BLD AUTO: 12.3 %
MONOCYTES NFR BLD AUTO: 14.4 %
NEUTROPHILS # BLD AUTO: 13 10E9/L (ref 1.6–8.3)
NEUTROPHILS # BLD AUTO: 6.2 10E9/L (ref 1.6–8.3)
NEUTROPHILS NFR BLD AUTO: 68.4 %
NEUTROPHILS NFR BLD AUTO: 82.9 %
NRBC # BLD AUTO: 0 10*3/UL
NRBC # BLD AUTO: 0 10*3/UL
NRBC BLD AUTO-RTO: 0 /100
NRBC BLD AUTO-RTO: 0 /100
PCO2 BLDV: 34 MM HG (ref 40–50)
PH BLDV: 7.36 PH (ref 7.32–7.43)
PLATELET # BLD AUTO: 160 10E9/L (ref 150–450)
PLATELET # BLD AUTO: 179 10E9/L (ref 150–450)
PO2 BLDV: 51 MM HG (ref 25–47)
POTASSIUM SERPL-SCNC: 3.8 MMOL/L (ref 3.4–5.3)
POTASSIUM SERPL-SCNC: 3.8 MMOL/L (ref 3.4–5.3)
PROCALCITONIN SERPL-MCNC: 0.08 NG/ML
PROT SERPL-MCNC: 7.4 G/DL (ref 6.8–8.8)
PROT SERPL-MCNC: 7.6 G/DL (ref 6.8–8.8)
RBC # BLD AUTO: 4.27 10E12/L (ref 4.4–5.9)
RBC # BLD AUTO: 4.34 10E12/L (ref 4.4–5.9)
SAO2 % BLDV FROM PO2: 85 %
SODIUM SERPL-SCNC: 134 MMOL/L (ref 133–144)
SODIUM SERPL-SCNC: 138 MMOL/L (ref 133–144)
WBC # BLD AUTO: 15.7 10E9/L (ref 4–11)
WBC # BLD AUTO: 9.1 10E9/L (ref 4–11)

## 2017-05-02 PROCEDURE — 36415 COLL VENOUS BLD VENIPUNCTURE: CPT | Performed by: EMERGENCY MEDICINE

## 2017-05-02 PROCEDURE — 96375 TX/PRO/DX INJ NEW DRUG ADDON: CPT | Performed by: EMERGENCY MEDICINE

## 2017-05-02 PROCEDURE — 83605 ASSAY OF LACTIC ACID: CPT

## 2017-05-02 PROCEDURE — 99284 EMERGENCY DEPT VISIT MOD MDM: CPT | Mod: Z6 | Performed by: EMERGENCY MEDICINE

## 2017-05-02 PROCEDURE — 99285 EMERGENCY DEPT VISIT HI MDM: CPT | Mod: Z6 | Performed by: EMERGENCY MEDICINE

## 2017-05-02 PROCEDURE — 84484 ASSAY OF TROPONIN QUANT: CPT | Performed by: EMERGENCY MEDICINE

## 2017-05-02 PROCEDURE — 85025 COMPLETE CBC W/AUTO DIFF WBC: CPT | Performed by: EMERGENCY MEDICINE

## 2017-05-02 PROCEDURE — 99285 EMERGENCY DEPT VISIT HI MDM: CPT | Mod: 25 | Performed by: EMERGENCY MEDICINE

## 2017-05-02 PROCEDURE — 86140 C-REACTIVE PROTEIN: CPT | Performed by: EMERGENCY MEDICINE

## 2017-05-02 PROCEDURE — 96374 THER/PROPH/DIAG INJ IV PUSH: CPT | Performed by: EMERGENCY MEDICINE

## 2017-05-02 PROCEDURE — 83690 ASSAY OF LIPASE: CPT | Performed by: EMERGENCY MEDICINE

## 2017-05-02 PROCEDURE — 84145 PROCALCITONIN (PCT): CPT | Performed by: EMERGENCY MEDICINE

## 2017-05-02 PROCEDURE — 96361 HYDRATE IV INFUSION ADD-ON: CPT | Performed by: EMERGENCY MEDICINE

## 2017-05-02 PROCEDURE — 85610 PROTHROMBIN TIME: CPT | Performed by: EMERGENCY MEDICINE

## 2017-05-02 PROCEDURE — 80053 COMPREHEN METABOLIC PANEL: CPT | Performed by: EMERGENCY MEDICINE

## 2017-05-02 PROCEDURE — 82803 BLOOD GASES ANY COMBINATION: CPT

## 2017-05-02 RX ORDER — HYDROMORPHONE HYDROCHLORIDE 1 MG/ML
0.5 INJECTION, SOLUTION INTRAMUSCULAR; INTRAVENOUS; SUBCUTANEOUS ONCE
Status: COMPLETED | OUTPATIENT
Start: 2017-05-02 | End: 2017-05-02

## 2017-05-02 RX ORDER — ONDANSETRON 2 MG/ML
4 INJECTION INTRAMUSCULAR; INTRAVENOUS ONCE
Status: COMPLETED | OUTPATIENT
Start: 2017-05-02 | End: 2017-05-02

## 2017-05-02 RX ORDER — ONDANSETRON 4 MG/1
4 TABLET, ORALLY DISINTEGRATING ORAL EVERY 8 HOURS PRN
Qty: 10 TABLET | Refills: 0 | Status: ON HOLD | OUTPATIENT
Start: 2017-05-02 | End: 2017-05-13

## 2017-05-02 RX ADMIN — HYDROMORPHONE HYDROCHLORIDE 0.2 MG: 1 INJECTION, SOLUTION INTRAMUSCULAR; INTRAVENOUS; SUBCUTANEOUS at 06:52

## 2017-05-02 RX ADMIN — ONDANSETRON 4 MG: 2 INJECTION INTRAMUSCULAR; INTRAVENOUS at 06:52

## 2017-05-02 ASSESSMENT — ENCOUNTER SYMPTOMS
VOMITING: 0
DYSURIA: 0
BACK PAIN: 0
SHORTNESS OF BREATH: 0
DIARRHEA: 1
HEADACHES: 0
PALPITATIONS: 0
ABDOMINAL PAIN: 1
COLOR CHANGE: 0
NAUSEA: 0
HEMATURIA: 0
BLOOD IN STOOL: 0
POLYDIPSIA: 0
WEAKNESS: 0
EYE PAIN: 0
TROUBLE SWALLOWING: 0
CHILLS: 0
CONSTIPATION: 0
FREQUENCY: 0
NECK PAIN: 0
FEVER: 0

## 2017-05-02 NOTE — DISCHARGE INSTRUCTIONS
Nonspecific Vomiting and Diarrhea (Adult)  Vomiting and diarrhea can have many causes, including:    Helping your body get rid of harmful substances     Gastroenteritis caused by viruses, parasites, bacteria, or toxins.    Allergy to or side effect of a food or medicine    Severe stress or worry (anxiety)     Other illnesses    Pregnancy  It is often hard to pinpoint an exact cause, even with testing. Vomiting and diarrhea often go away within a day or two without problems. If they continue, though, they can lead to too much loss of fluid (dehydration). This can be serious if not treated.    Home care  Medications    You may use acetaminophen or NSAID medicines like ibuprofen or naproxen to control fever, unless another medicine was prescribed. If you have chronic liver or kidney disease, talk with your healthcare provider before using these medicines. Also talk with your provider if you've had a stomach ulcer or gastrointestinal bleeding. Don't give aspirin to anyone under 18 years of age who is ill with a fever. Don't use NSAID medicines if you are already taking one for another condition (like arthritis) or are on aspirin (such as for heart disease or after a stroke)    If medicines for diarrhea or vomiting were prescribed, take these only as directed. Never take these without a healthcare provider s approval.  General care    If symptoms are severe, rest at home for the next 24 hours, or until you are feeling better.    Washing your hands with soap and water, or using alcohol-based hand  is the best way to stop the spread of infection. Wash your hands after touching anyone who is sick.    Wash your hands after using the toilet and before meals. Clean the toilet after each use.    Caffeine, tobacco, and alcohol can make the diarrhea, cramping, and pain worse. Remember, caffeine not only is in coffee, but also is in chocolate, some energy drinks, and teas.  Diet    Water and clear liquids are important  so you don't get dehydrated. Drink a small amount at a time. Don't guzzle down the drinks. That may increase your nausea, make cramping worse, and cause the drinks to come back up.    Sports drinks may also help. Make sure they are not too sugary, because this can sometimes make things worse. Also, don't drink beverages that are too acidic, like orange juice and grape juice.    If you are very dehydrated, sports drinks aren't a good choice. They have too much sugar and not enough electrolytes. In this case, commercially available products called oral rehydration solutions are best.  Food    Don't force yourself to eat, especially if you have cramps, diarrhea, or vomiting. Eat just a little at a time, and then wait a few minutes before you try to eat more.    Don't eat fatty, greasy, spicy, or fried foods.    Don't eat dairy products if you have diarrhea. They can make it worse.  During the first 24 hours (the first full day), follow the diet below:    Beverages: Sports drinks, soft drinks without caffeine, mineral water, and decaffeinated tea and coffee    Soups: Clear broth, consommé, and bouillon    Desserts: Plain gelatin, popsicles, and fruit juice bars  During the next 24 hours (the second day), you may add the following to the above if you are better. If not, continue what you did the first day:    Hot cereal, plain toast, bread, rolls, crackers    Plain noodles, rice, mashed potatoes, chicken noodle or rice soup    Unsweetened canned fruit (avoid pineapple), bananas    Limit fat intake to less than 15 grams per day by avoiding margarine, butter, oils, mayonnaise, sauces, gravies, fried foods, peanut butter, meat, poultry, and fish.    Limit fiber. Avoid raw or cooked vegetables, fresh fruits (except bananas) and bran cereals.    Limit caffeine and chocolate. No spices or seasonings except salt.  During the next 24 hours:    Gradually resume a normal diet, as you feel better and your symptoms improve.    If at  any time your symptoms start getting worse again, go back to clear liquids until you feel better.  Food preparation    If you have diarrhea, you should not prepare food for others. When preparing foods, wash your hands before and after.    Wash your hands or use alcohol-based  after using cutting boards, countertops, and knives that have been in contact with raw food.    Keep uncooked meats away from cooked and ready-to-eat foods.  Follow-up care  Follow up with your healthcare advisor, or as advised. Call if you do not get better in the next 2 to 3 days. If a stool (diarrhea) sample was taken, or cultures done, you will be told if they are positive, or if your treatment needs to be changed. You may call as directed for the results.  If X-rays were taken, and a radiologist has not yet looked at them, he or she will do so. You will be told if there is a change in the reading, especially if it affects your treatment.  Call 911  Call 911 if any of these occur:    Trouble breathing    Chest pain    Confusion    Severe drowsiness or trouble awakening    Fainting or loss of consciousness    Rapid heart rate    Seizure    Stiff neck    Severe weakness, dizziness, or lightheadedness  When to seek medical advice  Call your healthcare provider right away if any of these occur:    Bloody or black vomit or stools.    Severe, steady abdominal pain or any abdominal pain that is getting worse.    Severe headache or stiff neck    An inability to hold down even sips of liquids for more than 12 hours.    Vomiting that lasts more than 24 hours.    Diarrhea that lasts more than 24 hours.    Fever of 100.4 F (38.0 C) or higher that lasts more than 48 hours, or as directed by your health care provider    Yellowish color to your skin or the whites of your eyes.    Signs of dehydration, such as dry mouth, little urine (less than every 6 hours), or very dark urine.    4096-9315 [employer]. [address] [place] [address],  [place] [address]. All rights reserved. This information is not intended as a substitute for professional medical care. Always follow your healthcare professional's instructions.

## 2017-05-02 NOTE — TELEPHONE ENCOUNTER
Patient was in the hospital on 4/29/2017 for abdominal pain. Patient is scheudled to see Dr. Thomas Dill on 5/9/2017. Will route to nurse to follow up with patient.

## 2017-05-02 NOTE — MR AVS SNAPSHOT
Sohan [facility]   5/2/2017   Anticoagulation Therapy Visit    Description:  67 year old male   Provider:  Sarah Martinez, RN   Department:  [facility]           INR as of 5/2/2017     Today's INR 2.51!      Anticoagulation Summary as of 5/2/2017     INR goal    Prior goal 1.8-2.5   Today's INR 2.51!   Full instructions 5/2: 3 mg; Otherwise 4.5 mg on Sun, Tue, Thu; 3 mg all other days   Next INR check 5/8/2017    Indications   LVAD (left ventricular assist device) present [Z95.811]  Long-term (current) use of anticoagulants [Z79.01] [Z79.01]         May 2017 Details    Sun Mon Tue Wed Thu Fri Sat      1               2      3 mg   See details      3      3 mg         4      4.5 mg         5      3 mg         6      3 mg           7      4.5 mg         8            9               10               11               12               13                 14               15               16               17               18               19               20                 21               22               23               24               25               26               27                 28               29               30               31                   Date Details   05/02 This INR check       Date of next INR:  5/8/2017         How to take your warfarin dose     To take:  3 mg Take 1 of the 3 mg tablets.    To take:  4.5 mg Take 1.5 of the 3 mg tablets.

## 2017-05-02 NOTE — ED NOTES
Pt BIBA c/o abdominal pain. Daughter gave ginger ale, no relief. EMS put 18 G in R AC, and gave 4mg Zofran. Pt is bloated. Diarrhea yesterday. Was seen in ER 3 days prior for constipation. LVAD. Hx of stroke.

## 2017-05-02 NOTE — ED AVS SNAPSHOT
[facility], Emergency Department    [address] [facility] [address]    Phone:  [phone]                                       Sohan Rendon   MRN: [id number]    Department:  [facility], Emergency Department   Date of Visit:  5/2/2017           Patient Information     Date Of Birth          1950        Your diagnoses for this visit were:     Vomiting and diarrhea        You were seen by Hero Solomon MD.        Discharge Instructions         Nonspecific Vomiting and Diarrhea (Adult)  Vomiting and diarrhea can have many causes, including:    Helping your body get rid of harmful substances     Gastroenteritis caused by viruses, parasites, bacteria, or toxins.    Allergy to or side effect of a food or medicine    Severe stress or worry (anxiety)     Other illnesses    Pregnancy  It is often hard to pinpoint an exact cause, even with testing. Vomiting and diarrhea often go away within a day or two without problems. If they continue, though, they can lead to too much loss of fluid (dehydration). This can be serious if not treated.    Home care  Medications    You may use acetaminophen or NSAID medicines like ibuprofen or naproxen to control fever, unless another medicine was prescribed. If you have chronic liver or kidney disease, talk with your healthcare provider before using these medicines. Also talk with your provider if you've had a stomach ulcer or gastrointestinal bleeding. Don't give aspirin to anyone under 18 years of age who is ill with a fever. Don't use NSAID medicines if you are already taking one for another condition (like arthritis) or are on aspirin (such as for heart disease or after a stroke)    If medicines for diarrhea or vomiting were prescribed, take these only as directed. Never take these without a healthcare provider s approval.  General care    If symptoms are severe, rest at home for the next 24 hours, or until you are feeling better.    Washing your hands with  soap and water, or using alcohol-based hand  is the best way to stop the spread of infection. Wash your hands after touching anyone who is sick.    Wash your hands after using the toilet and before meals. Clean the toilet after each use.    Caffeine, tobacco, and alcohol can make the diarrhea, cramping, and pain worse. Remember, caffeine not only is in coffee, but also is in chocolate, some energy drinks, and teas.  Diet    Water and clear liquids are important so you don't get dehydrated. Drink a small amount at a time. Don't guzzle down the drinks. That may increase your nausea, make cramping worse, and cause the drinks to come back up.    Sports drinks may also help. Make sure they are not too sugary, because this can sometimes make things worse. Also, don't drink beverages that are too acidic, like orange juice and grape juice.    If you are very dehydrated, sports drinks aren't a good choice. They have too much sugar and not enough electrolytes. In this case, commercially available products called oral rehydration solutions are best.  Food    Don't force yourself to eat, especially if you have cramps, diarrhea, or vomiting. Eat just a little at a time, and then wait a few minutes before you try to eat more.    Don't eat fatty, greasy, spicy, or fried foods.    Don't eat dairy products if you have diarrhea. They can make it worse.  During the first 24 hours (the first full day), follow the diet below:    Beverages: Sports drinks, soft drinks without caffeine, mineral water, and decaffeinated tea and coffee    Soups: Clear broth, consommé, and bouillon    Desserts: Plain gelatin, popsicles, and fruit juice bars  During the next 24 hours (the second day), you may add the following to the above if you are better. If not, continue what you did the first day:    Hot cereal, plain toast, bread, rolls, crackers    Plain noodles, rice, mashed potatoes, chicken noodle or rice soup    Unsweetened canned fruit  (avoid pineapple), bananas    Limit fat intake to less than 15 grams per day by avoiding margarine, butter, oils, mayonnaise, sauces, gravies, fried foods, peanut butter, meat, poultry, and fish.    Limit fiber. Avoid raw or cooked vegetables, fresh fruits (except bananas) and bran cereals.    Limit caffeine and chocolate. No spices or seasonings except salt.  During the next 24 hours:    Gradually resume a normal diet, as you feel better and your symptoms improve.    If at any time your symptoms start getting worse again, go back to clear liquids until you feel better.  Food preparation    If you have diarrhea, you should not prepare food for others. When preparing foods, wash your hands before and after.    Wash your hands or use alcohol-based  after using cutting boards, countertops, and knives that have been in contact with raw food.    Keep uncooked meats away from cooked and ready-to-eat foods.  Follow-up care  Follow up with your healthcare advisor, or as advised. Call if you do not get better in the next 2 to 3 days. If a stool (diarrhea) sample was taken, or cultures done, you will be told if they are positive, or if your treatment needs to be changed. You may call as directed for the results.  If X-rays were taken, and a radiologist has not yet looked at them, he or she will do so. You will be told if there is a change in the reading, especially if it affects your treatment.  Call 911  Call 911 if any of these occur:    Trouble breathing    Chest pain    Confusion    Severe drowsiness or trouble awakening    Fainting or loss of consciousness    Rapid heart rate    Seizure    Stiff neck    Severe weakness, dizziness, or lightheadedness  When to seek medical advice  Call your healthcare provider right away if any of these occur:    Bloody or black vomit or stools.    Severe, steady abdominal pain or any abdominal pain that is getting worse.    Severe headache or stiff neck    An inability to hold  down even sips of liquids for more than 12 hours.    Vomiting that lasts more than 24 hours.    Diarrhea that lasts more than 24 hours.    Fever of 100.4 F (38.0 C) or higher that lasts more than 48 hours, or as directed by your health care provider    Yellowish color to your skin or the whites of your eyes.    Signs of dehydration, such as dry mouth, little urine (less than every 6 hours), or very dark urine.    1224-0915 [employer]. [address] [place] [address]. All rights reserved. This information is not intended as a substitute for professional medical care. Always follow your healthcare professional's instructions.          Future Appointments        Provider Department Dept Phone Center    5/9/2017 2:00 PM Thomas Dill MD [facility] [phone] [facility]    5/12/2017 11:00 AM Lab [facility] [phone] [facility]    5/12/2017 11:30 AM  HEALTH IMAGING CVC ECHO ROOM 1 [facility] [phone] [facility]    5/12/2017 1:00 PM CLAIRE Escobar [facility] [phone] [facility]    5/12/2017 1:00 PM UC CV DEVICE 1              [facility] [phone] [facility]    6/13/2017 2:00 PM Thomas Dill MD [facility] [phone] [facility]      24 Hour Appointment Hotline       To make an appointment at any [facility], call [phone] ([phone]). If you don't have a family doctor or clinic, we will help you find one. [facility] are conveniently located to serve the needs of you and your family.             Review of your medicines      START taking        Dose / Directions Last dose taken    ondansetron 4 MG ODT tab   Commonly known as:  ZOFRAN ODT   Dose:  4 mg   Quantity:  10 tablet        Take 1 tablet (4 mg) by mouth every 8 hours as needed for nausea   Refills:  0          Our records show that you are taking the medicines listed below. If these are incorrect, please call your family doctor or clinic.         Dose / Directions Last dose taken    acetaminophen 325 MG tablet   Commonly known as:  TYLENOL   Dose:  650 mg   Quantity:  100 tablet        Take 2 tablets (650 mg) by mouth every 6 hours   Refills:  0        allopurinol 100 MG tablet   Commonly known as:  ZYLOPRIM   Dose:  100 mg   Quantity:  90 tablet        Take 1 tablet (100 mg) by mouth daily   Refills:  3        ascorbic acid 500 MG tablet   Commonly known as:  VITAMIN C   Dose:  500 mg   Quantity:  90 tablet        Take 1 tablet (500 mg) by mouth daily With iron pill   Refills:  3        atorvastatin 10 MG tablet   Commonly known as:  LIPITOR   Quantity:  90 tablet        TAKE 1 TABLET (10 MG) BY MOUTH DAILY   Refills:  3        azelastine 0.05 % Soln ophthalmic solution   Commonly known as:  OPTIVAR   Dose:  1 drop   Quantity:  1 Bottle        Apply 1 drop to eye 2 times daily   Refills:  11        bisacodyl 5 MG EC tablet   Commonly known as:  DULCOLAX   Dose:  5 mg   Quantity:  20 tablet        Take 1 tablet (5 mg) by mouth daily as needed for constipation   Refills:  1        blood glucose monitoring lancets   Quantity:  1 Box        Use to test blood sugars 2 times daily or as directed.   Refills:  3        blood glucose monitoring test strip   Commonly known as:  no brand specified   Quantity:  1 Box        Use to test blood sugar 2 times daily or as directed.   Refills:  3        calcium carbonate-vitamin D 500-400 MG-UNIT Tabs per tablet   Dose:  1 tablet   Quantity:  90 tablet        Take 1 tablet by mouth daily   Refills:  3        carboxymethylcellulose 0.5 % Soln ophthalmic solution   Commonly known as:  REFRESH PLUS   Dose:  1 drop   Quantity:  30 mL        Place 1 drop into the right eye 3 times daily as needed for other (eye irritation or discomfort.)   Refills:  3        ferrous sulfate 325 (65 FE) MG tablet   Commonly known as:  IRON   Dose:  325 mg   Quantity:  90 tablet        Take 1 tablet (325 mg) by mouth daily (with breakfast)    Refills:  3        guaiFENesin-dextromethorphan 100-10 MG/5ML syrup   Commonly known as:  ROBITUSSIN DM   Dose:  5 mL   Quantity:  560 mL        Take 5 mLs by mouth every 4 hours as needed for cough   Refills:  3        levETIRAcetam 750 MG tablet   Commonly known as:  KEPPRA   Dose:  750 mg   Quantity:  180 tablet        Take 1 tablet (750 mg) by mouth 2 times daily   Refills:  3        loratadine 10 MG tablet   Commonly known as:  CLARITIN   Dose:  10 mg   Quantity:  90 tablet        Take 1 tablet (10 mg) by mouth daily   Refills:  3        losartan 25 MG tablet   Commonly known as:  COZAAR   Dose:  25 mg   Quantity:  45 tablet        Take 1 tablet (25 mg) by mouth daily   Refills:  3        melatonin 3 MG tablet   Dose:  3 mg        Take 1 tablet (3 mg) by mouth At Bedtime   Refills:  0        * metoprolol 25 MG 24 hr tablet   Commonly known as:  TOPROL-XL   Dose:  25 mg   Quantity:  90 tablet        Take 1 tablet (25 mg) by mouth every evening   Refills:  3        * metoprolol 50 MG 24 hr tablet   Commonly known as:  TOPROL-XL   Dose:  50 mg   Quantity:  90 tablet        Take 1 tablet (50 mg) by mouth daily   Refills:  3        nitroglycerin 0.4 MG sublingual tablet   Commonly known as:  NITROSTAT   Dose:  0.4 mg   Quantity:  30 tablet        Place 1 tablet (0.4 mg) under the tongue every 5 minutes as needed for chest pain   Refills:  1        nystatin 708601 UNIT/GM Powd   Commonly known as:  MYCOSTATIN   Quantity:  60 g        Apply to affected areas of skin in the groin and on the scrotum three times a day as needed.   Refills:  3        olopatadine 0.1 % ophthalmic solution   Commonly known as:  PATANOL   Dose:  1 drop   Quantity:  1 Bottle        Place 1 drop into both eyes 2 times daily   Refills:  11        * order for DME   Quantity:  1 Device        Equipment being ordered: Lift chair.  Please see indications and face-to-face encounter details in 2/3/15 Office Visit note.   Refills:  0        * order  for DME   Quantity:  2 each        Equipment being ordered: Bilateral leg supports for manual wheelchair.   Refills:  0        * order for DME   Quantity:  1 each        Equipment being ordered: Reclining manual w/c with bilateral elevating leg rests.   Refills:  0        * order for DME   Quantity:  1 each        Equipment being ordered: Hospital Bed, fully electric.   Refills:  0        * order for DME   Quantity:  1 each        Equipment being ordered: Wheelchair, manual.   Refills:  0        * order for DME   Quantity:  1 each        Equipment being ordered: Wheelchair, manual, with elevated leg rests and tilt in space back.  Please fax to [facility]; I called them 11/26/16 and they requested the new order.  Face to face is in my 10/26/16 note.   Refills:  0        * order for DME   Quantity:  2 each        Equipment being ordered: Cushioned heel boots.   Refills:  0        oxyCODONE 5 MG IR tablet   Commonly known as:  ROXICODONE   Dose:  5 mg   Quantity:  30 tablet        Take 1 tablet (5 mg) by mouth every 4 hours as needed for moderate to severe pain   Refills:  0        pantoprazole 40 MG EC tablet   Commonly known as:  PROTONIX   Dose:  40 mg   Quantity:  180 tablet        Take 1 tablet (40 mg) by mouth daily   Refills:  3        senna-docusate 8.6-50 MG per tablet   Commonly known as:  SENOKOT-S;PERICOLACE   Dose:  1-2 tablet   Quantity:  60 tablet        Take 1-2 tablets by mouth 2 times daily as needed for constipation   Refills:  3        simethicone 80 MG chewable tablet   Commonly known as:  MYLICON   Dose:  80 mg   Quantity:  180 tablet        Take 1 tablet (80 mg) by mouth 4 times daily   Refills:  5        tamsulosin 0.4 MG capsule   Commonly known as:  FLOMAX   Dose:  0.4 mg   Quantity:  90 capsule        Take 1 capsule (0.4 mg) by mouth daily   Refills:  3        thiamine 100 MG tablet   Quantity:  90 tablet        TAKE 1 TABLET (100 MG) BY MOUTH DAILY   Refills:  3        warfarin 3 MG tablet  "  Commonly known as:  COUMADIN   Quantity:  180 tablet        Take 3mg by mouth on  and . Take 4.5mg by mouth all other days of the week.   Refills:  3        * Notice:  This list has 9 medication(s) that are the same as other medications prescribed for you. Read the directions carefully, and ask your doctor or other care provider to review them with you.            Prescriptions were sent or printed at these locations (1 Prescription)                   Other Prescriptions                Printed at Department/Unit printer (1 of 1)         ondansetron (ZOFRAN ODT) 4 MG ODT tab                Procedures and tests performed during your visit     CBC with platelets differential    Comprehensive metabolic panel    INR    Lactic acid    Lipase      Orders Needing Specimen Collection     None      Pending Results     No orders found from 2017 to 5/3/2017.            Pending Culture Results     No orders found from 2017 to 5/3/2017.            Thank you for choosing [facility]       Thank you for choosing [facility] for your care. Our goal is always to provide you with excellent care. Hearing back from our patients is one way we can continue to improve our services. Please take a few minutes to complete the written survey that you may receive in the mail after you visit with us. Thank you!        [employer] Information     [employer] lets you send messages to your doctor, view your test results, renew your prescriptions, schedule appointments and more. To sign up, go to [email or web address] . Click on \"Log in\" on the left side of the screen, which will take you to the Welcome page. Then click on \"Sign up Now\" on the right side of the page.     You will be asked to enter the access code listed below, as well as some personal information. Please follow the directions to create your username and password.     Your access code is: [email or web address]  Expires: 2017 12:00 PM     Your access code will  in " 90 days. If you need help or a new code, please call your [facility] clinic or [phone].        Care EveryWhere ID     This is your Care EveryWhere ID. This could be used by other organizations to access your [facility] medical records  [id number]        After Visit Summary       This is your record. Keep this with you and show to your community pharmacist(s) and doctor(s) at your next visit.

## 2017-05-02 NOTE — LETTER
Transition Communication Hand-off for Care Transitions to Next Level of Care Provider    Name: Sohan Rendon  MRN #: [id number]  Primary Care Provider: Thomas Dill     Primary Clinic: [facility] [address] [place] [address]     [facility] [address]     Reason for Hospitalization:  Abdominal pain, epigastric [R10.13]  Admit Date/Time: 5/2/2017 10:15 PM  Discharge Date: 5/15/2017  Payor Source: Payor: UCARE / Plan: [employer] [facility] [employer] / Product Type: HMO /          Reason for Communication Hand-off Referral: Fragility  Multiple providers/specialties  Other Continuity of care    Discharge Plan: discharge to home with resumption of home services    Concern for non-adherence with plan of care:   No    Already enrolled in Tele-monitoring program and name of program:  NA  Follow-up specialty is recommended: Yes    Follow-up plan:  Future Appointments  Date Time Provider Department Center   5/16/2017 10:00 AM Orlando Lopez [facility]   5/17/2017 10:00 AM Teddy Gonzalez [facility]   5/18/2017 8:30 AM Thomas Dill MD [facility]   5/18/2017 10:00 AM London Durán MD [facility]   5/19/2017 10:00 AM London Durán MD [facility]   6/13/2017 2:00 PM Thomas Dill MD [facility]   6/22/2017 4:20 PM Sebastian Meier MD [facility]       Any outstanding tests or procedures:        Referrals     Future Labs/Procedures    Home care nursing referral     Comments:    Resume Home Care:  _______________________  [facility]  Phone  [phone]  Fax  [phone]  ______________________    Resume previous RN orders.  Pt will need INR checked on Wednesday May 17th.       Your provider has ordered home care nursing services. If you have not been contacted within 2 days of your discharge please call the inpatient department phone number at [phone] .              Meg Haskins  RN Care Coordinator  [phone]  AVS/Discharge Summary is the  source of truth; this is a helpful guide for improved communication of patient story

## 2017-05-02 NOTE — TELEPHONE ENCOUNTER
Patient's daughter called back and said the hospital doctor said that the patient should see Dr. Fuentes BETANCUR.  Daughter said patient is stable.  Advised her that next appointment is 5/9 and Dr. Dill is out of town before then.    Told her nurse would give her a follow up call today or tomorrow.    [phone]

## 2017-05-02 NOTE — IP AVS SNAPSHOT
[address] [address]    Phone:  [phone]                                       After Visit Summary   5/2/2017    Sohan Rendon    MRN: [id number]           After Visit Summary Signature Page     I have received my discharge instructions, and my questions have been answered. I have discussed any challenges I see with this plan with the nurse or doctor.    ..........................................................................................................................................  Patient/Patient Representative Signature      ..........................................................................................................................................  Patient Representative Print Name and Relationship to Patient    ..................................................               ................................................  Date                                            Time    ..........................................................................................................................................  Reviewed by Signature/Title    ...................................................              ..............................................  Date                                                            Time

## 2017-05-02 NOTE — PROGRESS NOTES
ANTICOAGULATION FOLLOW-UP CLINIC VISIT    Patient Name:  Sohan Rendon  Date:  5/2/2017  Contact Type:  Telephone    SUBJECTIVE:     Patient Findings     Positives Other complaints    Comments Pt was seen in ED today due to vomiting/diarhea. Pt was D/C'd after getting IV Dilaudid and feeling ok. Pt was sent home with a stool sample kit due to MD suspecting pt having possible infection.             OBJECTIVE    INR   Date Value Ref Range Status   05/02/2017 2.51 (H) 0.86 - 1.14 Final     Chromogenic Factor 10   Date Value Ref Range Status   08/10/2014 24 (L) 70 - 130 % Final     Comment:     Therapeutic Range:  A Chromogenic Factor 10 level of approximately 20-40%   inversely correlates with an INR of 2-3 for patients receiving Warfarin.   Chromogenic Factor 10 levels below 20% indicate an INR greater than 3 and   levels above 40% indicate an INR less than 2.       Factor 2 Assay   Date Value Ref Range Status   07/21/2014 25 (L) 60 - 140 % Final     Comment:     Analyte Specific Reagents (ASRs) are used in many laboratory tests necessary   for   standard medical care and generally do not require FDA approval.  This test   was   developed and its performance characteristics determined by [facility].  It has not been cleared or approved by   the US Food and Drug Administration.         ASSESSMENT / PLAN  INR assessment THER    Recheck INR In: 1 WEEK    INR Location Clinic      Anticoagulation Summary as of 5/2/2017     INR goal    Prior goal 1.8-2.5   Today's INR 2.51!   Maintenance plan 4.5 mg (3 mg x 1.5) on Sun, Tue, Thu; 3 mg (3 mg x 1) all other days   Full instructions 5/2: 3 mg; Otherwise 4.5 mg on Sun, Tue, Thu; 3 mg all other days   Weekly total 25.5 mg   Plan last modified Blanca Bah RN (2/27/2017)   Next INR check 5/8/2017   Priority INR   Target end date Indefinite    Indications   LVAD (left ventricular assist device) present [Z95.811]  Long-term  (current) use of anticoagulants [Z79.01] [Z79.01]         Anticoagulation Episode Summary     INR check location     Preferred lab     Send INR reminders to [facility]    Comments Goal Range is 1.8-2.3  [facility] comes out to draw INR  Alex Muse Ph [phone]      Anticoagulation Care Providers     Provider Role Specialty Phone number    Evon Lemons MD Responsible Cardiology [phone]            See the Encounter Report to view Anticoagulation Flowsheet and Dosing Calendar (Go to Encounters tab in chart review, and find the Anticoagulation Therapy Visit)    Left message for alex Muse with results and dosing recommendations. Asked patient to call back to report any missed doses, falls, signs and symptoms of bleeding or clotting, any changes in health, medication, or diet. Asked patient to call back with any questions or concerns.     Sarah Martinez RN

## 2017-05-02 NOTE — IP AVS SNAPSHOT
MRN:[id number]                      After Visit Summary   5/2/2017    Sohan Rendon    MRN: [id number]           Thank you!     Thank you for choosing [facility] for your care. Our goal is always to provide you with excellent care. Hearing back from our patients is one way we can continue to improve our services. Please take a few minutes to complete the written survey that you may receive in the mail after you visit with us. Thank you!        Patient Information     Date Of Birth          1950        Designated Caregiver       Most Recent Value    Caregiver    Will someone help with your care after discharge? yes    Name of designated caregiver Antionette     Phone number of caregiver [phone]    Caregiver address [address]      About your hospital stay     You were admitted on:  May 3, 2017 You last received care in the:  [address] [facility]    You were discharged on:  May 15, 2017        Reason for your hospital stay       You were admitted to the hospital for a gallbladder blockage and infection. This was addressed with stenting of the gallbladder duct and antibiotics.                  Who to Call     For medical emergencies, please call 911.  For non-urgent questions about your medical care, please call your primary care provider or clinic, [phone]  For questions related to your surgery, please call your surgery clinic        Attending Provider     Provider Specialty    Nohelia Arellano MD Emergency Medicine    Franki Santizo MD Internal Medicine    Selene Frias MD Pediatrics    Gabe Peters MD Internal Medicine       Primary Care Provider Office Phone # Fax #    Thomas Dill [phone]       [facility] [address] [place] [address]         When to contact your care team       Call your primary doctor if you have any of the following: temperature greater than 100.4,  increased  shortness of breath, increased swelling in your legs, vomiting, constipation, or increased abdominal pain.                  After Care Instructions     Activity       Your activity upon discharge: up by lift with assist            Diet       Follow this diet upon discharge:       Snacks/Supplements Adult: Ensure Clear; With Meals      Low Saturated Fat Na <2400 mg            Monitor and record       blood pressure daily  weight every day                  Follow-up Appointments     Adult [facility] Follow-up and recommended labs and tests       Follow up with Dr. Dill with  Complex Care within 1 week for hospital follow-up. Recommend repeating CBC and CMP at that time.  Follow up with Dr. Meier with Urology within 1 to 2 months  to evaluate medication change and ongoing treatment for hematuria and BPH.  Resume care with cardiology clinic.     Appointments on [place] and/or [facility] (with [facility] or [facility] provider or service). Call [phone] if you haven't heard regarding these appointments within 7 days of discharge.                  Your next 10 appointments already scheduled     May 18, 2017  8:30 AM CDT   Return Visit with Thomas Dill MD   [facility] ([facility])    [address]  [address] [place] [id number]   [phone]            Jun 13, 2017  2:00 PM CDT   Return Visit with Thomas Dill MD   [facility] ([facility])    [address]  [address]  [facility] [address]   [phone]            Jun 22, 2017  4:20 PM CDT   (Arrive by 4:05 PM)   Return Visit with Sebastian Meier MD   [facility] ([facility])    [address]  4th Floor  [facility] [id number] [phone]              Additional Services     Home care nursing referral       Resume Home Care:  _______________________  [facility]  Phone  " [phone]  Fax  [phone]  ______________________    Resume previous RN orders.  Pt will need INR checked on Wednesday May 17th.       Your provider has ordered home care nursing services. If you have not been contacted within 2 days of your discharge please call the inpatient department phone number at [phone] .                  Further instructions from your care team       __________________________________________________________  D/C'ing nurse: Please fax to following agencies at time of patient d/c:    _______________________  [facility]  Phone  [phone]  Fax  [phone]  ______________________      ____________________________________________________________        Pending Results     Date and Time Order Name Status Description    5/15/2017 0902 Cytology Non Gyn In process             Statement of Approval     Ordered          05/15/17 1509  I have reviewed and agree with all the recommendations and orders detailed in this document.  EFFECTIVE NOW     Approved and electronically signed by:  Thomas Clay MD             Admission Information     Date & Time Provider Department Dept. Phone    5/2/2017 Gabe Peters MD [address] [facility] [phone]      Your Vitals Were     Blood Pressure Pulse Temperature Respirations Height Weight    101/78 (BP Location: Right arm) 63 98.6  F (37  C) (Oral) 16 1.727 m (5' 8\") 86.3 kg (190 lb 4.1 oz)    Pulse Oximetry BMI (Body Mass Index)                99% 28.93 kg/m2          MyChart Information     [employer] lets you send messages to your doctor, view your test results, renew your prescriptions, schedule appointments and more. To sign up, go to [email or web address] . Click on \"Log in\" on the left side of the screen, which will take you to the Welcome page. Then click on \"Sign up Now\" on the right side of the page.     You will be asked to enter the access code listed below, as well as some personal information. Please follow " the directions to create your username and password.     Your access code is: [email or web address]  Expires: 2017 12:00 PM     Your access code will  in 90 days. If you need help or a new code, please call your [facility] clinic or [phone].        Care EveryWhere ID     This is your Care EveryWhere ID. This could be used by other organizations to access your [facility] medical records  [id number]           Review of your medicines      START taking        Dose / Directions    ciprofloxacin 500 MG tablet   Commonly known as:  CIPRO   Indication:  Infection Within the Abdomen   Used for:  Acute cholecystitis        Dose:  500 mg   Take 1 tablet (500 mg) by mouth every 12 hours for 8 days   Quantity:  16 tablet   Refills:  0       * finasteride 5 MG tablet   Commonly known as:  PROSCAR   Used for:  Incomplete bladder emptying        Dose:  5 mg   Take 1 tablet (5 mg) by mouth daily   Quantity:  90 tablet   Refills:  3       * finasteride 5 MG tablet   Commonly known as:  PROSCAR   Used for:  Incomplete bladder emptying        Dose:  5 mg   Take 1 tablet (5 mg) by mouth daily   Quantity:  30 tablet   Refills:  3       lactobacillus rhamnosus (GG) capsule   Used for:  Diarrhea, unspecified type, Acute cholecystitis        Dose:  1 capsule   Take 1 capsule by mouth 2 times daily for 11 days   Quantity:  22 capsule   Refills:  0       magnesium oxide 400 MG tablet   Commonly known as:  MAG-OX   Used for:  Abdominal distention, Drug-induced constipation        Dose:  400 mg   Take 1 tablet (400 mg) by mouth 2 times daily   Quantity:  14 tablet   Refills:  0       * Notice:  This list has 2 medication(s) that are the same as other medications prescribed for you. Read the directions carefully, and ask your doctor or other care provider to review them with you.      CONTINUE these medicines which may have CHANGED, or have new prescriptions. If we are uncertain of the size of tablets/capsules you have at home, strength may  be listed as something that might have changed.        Dose / Directions    acetaminophen 325 MG tablet   Commonly known as:  TYLENOL   This may have changed:    - when to take this  - reasons to take this   Used for:  Femoral neck fracture, right, closed, initial encounter        Dose:  650 mg   Take 2 tablets (650 mg) by mouth every 6 hours   Quantity:  100 tablet   Refills:  0       azelastine 0.05 % Soln ophthalmic solution   Commonly known as:  OPTIVAR   This may have changed:    - when to take this  - reasons to take this        Dose:  1 drop   Apply 1 drop to eye 2 times daily   Quantity:  1 Bottle   Refills:  11       * bisacodyl 5 MG EC tablet   Commonly known as:  DULCOLAX   This may have changed:  Another medication with the same name was added. Make sure you understand how and when to take each.   Used for:  Constipation        Dose:  5 mg   Take 1 tablet (5 mg) by mouth daily as needed for constipation   Quantity:  20 tablet   Refills:  1       * bisacodyl 10 MG Suppository   Commonly known as:  DULCOLAX   This may have changed:  You were already taking a medication with the same name, and this prescription was added. Make sure you understand how and when to take each.   Used for:  Drug-induced constipation        Dose:  10 mg   Place 1 suppository (10 mg) rectally daily as needed for constipation   Quantity:  5 suppository   Refills:  1       * Notice:  This list has 2 medication(s) that are the same as other medications prescribed for you. Read the directions carefully, and ask your doctor or other care provider to review them with you.      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, unspecified site        Dose:  100 mg   Take 1 tablet (100 mg) by mouth daily   Quantity:  90 tablet   Refills:  3       ascorbic acid 500 MG tablet   Commonly known as:  VITAMIN  C        Dose:  500 mg   Take 1 tablet (500 mg) by mouth daily With iron pill   Quantity:  90 tablet   Refills:  3       atorvastatin 10 MG tablet   Commonly known as:  LIPITOR   Used for:  Hyperlipidemia LDL goal <100        TAKE 1 TABLET (10 MG) BY MOUTH DAILY   Quantity:  90 tablet   Refills:  3       blood glucose monitoring lancets   Used for:  Diabetes mellitus, type 2 (H)        Use to test blood sugars 2 times daily or as directed.   Quantity:  1 Box   Refills:  3       blood glucose monitoring test strip   Commonly known as:  no brand specified   Used for:  Type 2 diabetes mellitus with stage 3 chronic kidney disease (H)        Use to test blood sugar 2 times daily or as directed.   Quantity:  1 Box   Refills:  3       calcium carbonate-vitamin D 500-400 MG-UNIT Tabs per tablet   Used for:  Cardiac arrhythmia, unspecified cardiac arrhythmia type        Dose:  1 tablet   Take 1 tablet by mouth daily   Quantity:  90 tablet   Refills:  3       carboxymethylcellulose 0.5 % Soln ophthalmic solution   Commonly known as:  REFRESH PLUS   Used for:  Dry eye syndrome of bilateral lacrimal glands        Dose:  1 drop   Place 1 drop into the right eye 3 times daily as needed for other (eye irritation or discomfort.)   Quantity:  30 mL   Refills:  3       guaiFENesin-dextromethorphan 100-10 MG/5ML syrup   Commonly known as:  ROBITUSSIN DM   Used for:  Chronic cough        Dose:  5 mL   Take 5 mLs by mouth every 4 hours as needed for cough   Quantity:  560 mL   Refills:  3       levETIRAcetam 750 MG tablet   Commonly known as:  KEPPRA   Used for:  Convulsions, unspecified convulsion type (H)        Dose:  750 mg   Take 1 tablet (750 mg) by mouth 2 times daily   Quantity:  180 tablet   Refills:  3       loratadine 10 MG tablet   Commonly known as:  CLARITIN   Used for:  Allergic rhinitis, unspecified allergic rhinitis type        Dose:  10 mg   Take 1 tablet (10 mg) by mouth daily   Quantity:  90 tablet   Refills:  3        losartan 25 MG tablet   Commonly known as:  COZAAR   Used for:  CHF (congestive heart failure), NYHA class IV, chronic, systolic (H)        Dose:  25 mg   Take 1 tablet (25 mg) by mouth daily   Quantity:  45 tablet   Refills:  3       melatonin 3 MG tablet   Used for:  Insomnia, unspecified type        Dose:  3 mg   Take 1 tablet (3 mg) by mouth At Bedtime   Refills:  0       * metoprolol 25 MG 24 hr tablet   Commonly known as:  TOPROL-XL   Used for:  LVAD (left ventricular assist device) present (H)        Dose:  25 mg   Take 1 tablet (25 mg) by mouth every evening   Quantity:  90 tablet   Refills:  3       * metoprolol 50 MG 24 hr tablet   Commonly known as:  TOPROL-XL        Dose:  50 mg   Take 1 tablet (50 mg) by mouth daily   Quantity:  90 tablet   Refills:  3       nitroglycerin 0.4 MG sublingual tablet   Commonly known as:  NITROSTAT   Used for:  Coronary artery disease involving native coronary artery with unstable angina pectoris (H)        Dose:  0.4 mg   Place 1 tablet (0.4 mg) under the tongue every 5 minutes as needed for chest pain   Quantity:  30 tablet   Refills:  1       nystatin 920013 UNIT/GM Powd   Commonly known as:  MYCOSTATIN        Apply to affected areas of skin in the groin and on the scrotum three times a day as needed.   Quantity:  60 g   Refills:  3       olopatadine 0.1 % ophthalmic solution   Commonly known as:  PATANOL   Used for:  Allergic conjunctivitis of both eyes        Dose:  1 drop   Place 1 drop into both eyes 2 times daily   Quantity:  1 Bottle   Refills:  11       * order for DME   Used for:  Fracture of femoral neck, right, closed, initial encounter, Stroke (H), CHF (congestive heart failure), NYHA class IV (H)        Equipment being ordered: Lift chair.  Please see indications and face-to-face encounter details in 2/3/15 Office Visit note.   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   Quantity:  1 each   Refills:  0       * order for DME   Used for:  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), Displaced fracture of right femoral neck, closed, with nonunion, subsequent encounter        Equipment being ordered: Wheelchair, manual, with elevated leg rests and tilt in space back.  Please fax to [facility]; I called them 11/26/16 and they requested the new order.  Face to face is in my 10/26/16 note.   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, subsequent encounter        Dose:  5 mg   Take 1 tablet (5 mg) by mouth  every 4 hours as needed for moderate to severe pain   Quantity:  30 tablet   Refills:  0       pantoprazole 40 MG EC tablet   Commonly known as:  PROTONIX   Used for:  Gastrointestinal hemorrhage associated with chronic superficial gastritis        Dose:  40 mg   Take 1 tablet (40 mg) by mouth daily   Quantity:  180 tablet   Refills:  3       senna-docusate 8.6-50 MG per tablet   Commonly known as:  SENOKOT-S;PERICOLACE   Used for:  Slow transit constipation        Dose:  1-2 tablet   Take 1-2 tablets by mouth 2 times daily as needed for constipation   Quantity:  60 tablet   Refills:  3       simethicone 80 MG chewable tablet   Commonly known as:  MYLICON   Used for:  Slow transit constipation        Dose:  80 mg   Take 1 tablet (80 mg) by mouth 4 times daily   Quantity:  180 tablet   Refills:  5       tamsulosin 0.4 MG capsule   Commonly known as:  FLOMAX   Used for:  Incomplete bladder emptying        Dose:  0.4 mg   Take 1 capsule (0.4 mg) by mouth daily   Quantity:  90 capsule   Refills:  3       thiamine 100 MG tablet   Used for:  Cerebrovascular accident (CVA) due to embolism of right middle cerebral artery (H)        TAKE 1 TABLET (100 MG) BY MOUTH DAILY   Quantity:  90 tablet   Refills:  3       warfarin 3 MG tablet   Commonly known as:  COUMADIN   Used for:  Cerebrovascular accident (CVA) due to embolism of right middle cerebral artery (H)        Take 3mg by mouth on Mondays and Thursdays. Take 4.5mg by mouth all other days of the week.   Quantity:  180 tablet   Refills:  3       * Notice:  This list has 9 medication(s) that are the same as other medications prescribed for you. Read the directions carefully, and ask your doctor or other care provider to review them with you.      STOP taking     ferrous sulfate 325 (65 FE) MG tablet   Commonly known as:  IRON           ondansetron 4 MG ODT tab   Commonly known as:  ZOFRAN ODT                Where to get your medicines      These medications were sent to  [facility]pharm[facility] #[id number] - [place], [place] - [address]  [address], [place] [address]     Phone:  [phone]     finasteride 5 MG tablet         These medications were sent to [facility] - [place], [place] - [address]  [address] [facility] [address]     Phone:  [phone]     bisacodyl 10 MG Suppository    ciprofloxacin 500 MG tablet    finasteride 5 MG tablet    magnesium oxide 400 MG tablet         Some of these will need a paper prescription and others can be bought over the counter. Ask your nurse if you have questions.     Bring a paper prescription for each of these medications     lactobacillus rhamnosus (GG) capsule                Protect others around you: Learn how to safely use, store and throw away your medicines at www.disposemymeds.org.             Medication List: This is a list of all your medications and when to take them. Check marks below indicate your daily home schedule. Keep this list as a reference.      Medications           Morning Afternoon Evening Bedtime As Needed    acetaminophen 325 MG tablet   Commonly known as:  TYLENOL   Take 2 tablets (650 mg) by mouth every 6 hours   Last time this was given:  650 mg on 5/15/2017 11:55 AM                                allopurinol 100 MG tablet   Commonly known as:  ZYLOPRIM   Take 1 tablet (100 mg) by mouth daily   Last time this was given:  100 mg on 5/15/2017  8:44 AM                                ascorbic acid 500 MG tablet   Commonly known as:  VITAMIN C   Take 1 tablet (500 mg) by mouth daily With iron pill                                atorvastatin 10 MG tablet   Commonly known as:  LIPITOR   TAKE 1 TABLET (10 MG) BY MOUTH DAILY   Last time this was given:  10 mg on 5/15/2017  8:44 AM                                azelastine 0.05 % Soln ophthalmic solution   Commonly known as:  OPTIVAR   Apply 1 drop to eye 2 times daily                                * bisacodyl 5 MG EC tablet    Commonly known as:  DULCOLAX   Take 1 tablet (5 mg) by mouth daily as needed for constipation   Last time this was given:  5 mg on 5/9/2017  1:38 AM                                * bisacodyl 10 MG Suppository   Commonly known as:  DULCOLAX   Place 1 suppository (10 mg) rectally daily as needed for constipation   Last time this was given:  10 mg on 5/12/2017 10:42 AM                                blood glucose monitoring lancets   Use to test blood sugars 2 times daily or as directed.                                blood glucose monitoring test strip   Commonly known as:  no brand specified   Use to test blood sugar 2 times daily or as directed.                                calcium carbonate-vitamin D 500-400 MG-UNIT Tabs per tablet   Take 1 tablet by mouth daily                                carboxymethylcellulose 0.5 % Soln ophthalmic solution   Commonly known as:  REFRESH PLUS   Place 1 drop into the right eye 3 times daily as needed for other (eye irritation or discomfort.)   Last time this was given:  1 drop on 5/8/2017  9:25 AM                                ciprofloxacin 500 MG tablet   Commonly known as:  CIPRO   Take 1 tablet (500 mg) by mouth every 12 hours for 8 days   Last time this was given:  500 mg on 5/15/2017 11:50 AM                                * finasteride 5 MG tablet   Commonly known as:  PROSCAR   Take 1 tablet (5 mg) by mouth daily   Last time this was given:  5 mg on 5/15/2017  8:45 AM                                * finasteride 5 MG tablet   Commonly known as:  PROSCAR   Take 1 tablet (5 mg) by mouth daily   Last time this was given:  5 mg on 5/15/2017  8:45 AM                                guaiFENesin-dextromethorphan 100-10 MG/5ML syrup   Commonly known as:  ROBITUSSIN DM   Take 5 mLs by mouth every 4 hours as needed for cough                                lactobacillus rhamnosus (GG) capsule   Take 1 capsule by mouth 2 times daily for 11 days   Last time this was given:  1  capsule on 5/15/2017  8:44 AM                                levETIRAcetam 750 MG tablet   Commonly known as:  KEPPRA   Take 1 tablet (750 mg) by mouth 2 times daily   Last time this was given:  750 mg on 5/15/2017  8:44 AM                                loratadine 10 MG tablet   Commonly known as:  CLARITIN   Take 1 tablet (10 mg) by mouth daily   Last time this was given:  10 mg on 5/15/2017  8:44 AM                                losartan 25 MG tablet   Commonly known as:  COZAAR   Take 1 tablet (25 mg) by mouth daily   Last time this was given:  25 mg on 5/15/2017  8:44 AM                                magnesium oxide 400 MG tablet   Commonly known as:  MAG-OX   Take 1 tablet (400 mg) by mouth 2 times daily   Last time this was given:  400 mg on 5/15/2017  8:45 AM                                melatonin 3 MG tablet   Take 1 tablet (3 mg) by mouth At Bedtime   Last time this was given:  3 mg on 5/15/2017 12:42 AM                                * metoprolol 25 MG 24 hr tablet   Commonly known as:  TOPROL-XL   Take 1 tablet (25 mg) by mouth every evening   Last time this was given:  50 mg on 5/15/2017  8:45 AM                                * metoprolol 50 MG 24 hr tablet   Commonly known as:  TOPROL-XL   Take 1 tablet (50 mg) by mouth daily   Last time this was given:  50 mg on 5/15/2017  8:45 AM                                nitroglycerin 0.4 MG sublingual tablet   Commonly known as:  NITROSTAT   Place 1 tablet (0.4 mg) under the tongue every 5 minutes as needed for chest pain                                nystatin 992580 UNIT/GM Powd   Commonly known as:  MYCOSTATIN   Apply to affected areas of skin in the groin and on the scrotum three times a day as needed.                                olopatadine 0.1 % ophthalmic solution   Commonly known as:  PATANOL   Place 1 drop into both eyes 2 times daily   Last time this was given:  1 drop on 5/15/2017  8:44 AM                                * order for DME    Equipment being ordered: Lift chair.  Please see indications and face-to-face encounter details in 2/3/15 Office Visit note.                                * order for DME   Equipment being ordered: Bilateral leg supports for manual wheelchair.                                * order for DME   Equipment being ordered: Reclining manual w/c with bilateral elevating leg rests.                                * order for DME   Equipment being ordered: Hospital Bed, fully electric.                                * order for DME   Equipment being ordered: Wheelchair, manual.                                * order for DME   Equipment being ordered: Wheelchair, manual, with elevated leg rests and tilt in space back.  Please fax to [facility]; I called them 11/26/16 and they requested the new order.  Face to face is in my 10/26/16 note.                                * order for DME   Equipment being ordered: Cushioned heel boots.                                oxyCODONE 5 MG IR tablet   Commonly known as:  ROXICODONE   Take 1 tablet (5 mg) by mouth every 4 hours as needed for moderate to severe pain   Last time this was given:  5 mg on 5/12/2017  6:38 AM                                pantoprazole 40 MG EC tablet   Commonly known as:  PROTONIX   Take 1 tablet (40 mg) by mouth daily   Last time this was given:  40 mg on 5/15/2017  8:45 AM                                senna-docusate 8.6-50 MG per tablet   Commonly known as:  SENOKOT-S;PERICOLACE   Take 1-2 tablets by mouth 2 times daily as needed for constipation   Last time this was given:  1 tablet on 5/15/2017  8:46 AM                                simethicone 80 MG chewable tablet   Commonly known as:  MYLICON   Take 1 tablet (80 mg) by mouth 4 times daily   Last time this was given:  80 mg on 5/12/2017  9:21 AM                                tamsulosin 0.4 MG capsule   Commonly known as:  FLOMAX   Take 1 capsule (0.4 mg) by mouth daily   Last time this was given:  0.4 mg  on 5/15/2017 11:50 AM                                thiamine 100 MG tablet   TAKE 1 TABLET (100 MG) BY MOUTH DAILY   Last time this was given:  100 mg on 5/15/2017  8:44 AM                                warfarin 3 MG tablet   Commonly known as:  COUMADIN   Take 3mg by mouth on Mondays and Thursdays. Take 4.5mg by mouth all other days of the week.   Last time this was given:  4 mg on 5/13/2017  5:41 PM                                * Notice:  This list has 13 medication(s) that are the same as other medications prescribed for you. Read the directions carefully, and ask your doctor or other care provider to review them with you.

## 2017-05-02 NOTE — TELEPHONE ENCOUNTER
"ED/Discharge Protocol    \"Hi, my name is Ekaterina, a registered nurse, and I am calling on behalf of Dr. Dill's office at [place].  I am calling to follow up and see how things are going for you after your recent visit.\"    \"I see that you were in the (ER/UC/IP) on 4/29/17.  He has been in the ED 16 times in the past year.    Pt's daughter stated they are in the ED again now and she will call us back.    Ekaterina Nielson RN          "

## 2017-05-02 NOTE — ED PROVIDER NOTES
History     Chief Complaint   Patient presents with     Abdominal Pain     HPI  Sohan Rendon is a 67 year old male with an LVAD who presents with abdominal pain. He was seen in the ER with abdominal pain.  The patient was seen in the ER 3 days ago for abdominal pain and vomiting.  He had a CT abdomen and pelvis done which was normal.  Since his ER visit, he has had frequent episodes of non-bloody diarrhea.   He has some mid abdominal pain.  No shortness of breath and no chest pain.  He denies any lower extremity swelling.  The patient was treated for acute appendicitis 2 months ago and was treated nonoperatively.     PAST MEDICAL HISTORY:   Past Medical History:   Diagnosis Date     Acute right MCA stroke (H) 6/2013    With L sided hemiparesis      Anemia of chronic disease     Baseline Hb 8-9     BPH (benign prostatic hyperplasia)      CHF (congestive heart failure), NYHA class IV (H) 10/9/2012    s/p HeartMate II.  Was on milrinone and dobutamine prior to LVAD      CKD (chronic kidney disease)     Baseline Cr=     Clostridium difficile colitis 12/2012      Dysphagia     PEG tube placed in 2012.  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  Normal carotid duplex 3/2014.       Esophagitis 12/2012    EGD with esophagitis and multiple douenal ulcers     Fracture of femoral neck, right, closed 2/3/2015    Presumed pathologic as patient is non-weight-bearing and suffered no trauma.  Family declined operative repair during hospital stay 1/23 - 1/30/15.     Gastric and duodenal angiodysplasia with hemorrhage 6/18/2015     GERD (gastroesophageal reflux disease)      Gout      HTN (hypertension)     LDL=59 (3/29/2014)     Hyperlipaemia      Ischemic cardiomyopathy      Mitral regurgitation     s/p MVR with Carbomedics ring      Myocardial infarction (H) 1998    In [facility] without intervention      Nonsenile cataract     BE      PFO (patent foramen ovale)     s/p closure (10/9/2012)     TB lung, latent     s/p 9 months INH in 2012        PAST SURGICAL HISTORY:   Past Surgical History:   Procedure Laterality Date     C CABG, VEIN, FIVE  2001     CATARACT IOL, RT/LT Right 11/19/2015     ESOPHAGOSCOPY, GASTROSCOPY, DUODENOSCOPY (EGD), COMBINED N/A 6/10/2015    Procedure: COMBINED ESOPHAGOSCOPY, GASTROSCOPY, DUODENOSCOPY (EGD);  Surgeon: Edu Jenkins MD;  Location: [facility]     ESOPHAGOSCOPY, GASTROSCOPY, DUODENOSCOPY (EGD), COMBINED N/A 6/15/2015    Procedure: COMBINED ESOPHAGOSCOPY, GASTROSCOPY, DUODENOSCOPY (EGD);  Surgeon: Yury Bonner MD;  Location: [facility] OR     H INSERT ICD  2/10/2011    And pacemaker.  Not BiV     INSERT VENTRICULAR ASSIST DEVICE LEFT (HEARTMATE II)  10/9/2012     IR GASTRO JEJUNOSTOMY TUBE PLACEMENT       PHACOEMULSIFICATION CLEAR CORNEA WITH STANDARD INTRAOCULAR LENS IMPLANT Right 11/19/2015    Procedure: PHACOEMULSIFICATION CLEAR CORNEA WITH STANDARD INTRAOCULAR LENS IMPLANT;  Surgeon: Violeta Cosme MD;  Location: [facility] OR     REPAIR PATENT FORAMEN OVALE  10/9/2012     REPAIR VALVE MITRAL  2/9/2012    28 mm Carbomedics 2/3 ring        FAMILY HISTORY:   Family History   Problem Relation Age of Onset     Family History Negative No family hx of      Glaucoma No family hx of      Macular Degeneration No family hx of      CANCER No family hx of      no skin cancer       SOCIAL HISTORY:   Social History   Substance Use Topics     Smoking status: Former Smoker     Smokeless tobacco: Former User     Alcohol use No       Patient's Medications   New Prescriptions    ONDANSETRON (ZOFRAN ODT) 4 MG ODT TAB    Take 1 tablet (4 mg) by mouth every 8 hours as needed for nausea   Previous Medications    ACETAMINOPHEN (TYLENOL) 325 MG TABLET    Take 2 tablets (650 mg) by mouth every 6 hours    ALLOPURINOL (ZYLOPRIM) 100 MG TABLET    Take 1 tablet (100 mg) by mouth daily    ASCORBIC ACID (VITAMIN C) 500 MG TABLET    Take 1 tablet  (500 mg) by mouth daily With iron pill    ATORVASTATIN (LIPITOR) 10 MG TABLET    TAKE 1 TABLET (10 MG) BY MOUTH DAILY    AZELASTINE (OPTIVAR) 0.05 % SOLN    Apply 1 drop to eye 2 times daily    BISACODYL (DULCOLAX) 5 MG EC TABLET    Take 1 tablet (5 mg) by mouth daily as needed for constipation    BLOOD GLUCOSE MONITORING (NO BRAND SPECIFIED) LANCETS    Use to test blood sugars 2 times daily or as directed.    BLOOD GLUCOSE MONITORING (NO BRAND SPECIFIED) TEST STRIP    Use to test blood sugar 2 times daily or as directed.    CALCIUM CARBONATE-VITAMIN D 500-400 MG-UNIT TABS PER TABLET    Take 1 tablet by mouth daily    CARBOXYMETHYLCELLULOSE (REFRESH PLUS) 0.5 % SOLN    Place 1 drop into the right eye 3 times daily as needed for other (eye irritation or discomfort.)    FERROUS SULFATE (IRON) 325 (65 FE) MG TABLET    Take 1 tablet (325 mg) by mouth daily (with breakfast)    GUAIFENESIN-DEXTROMETHORPHAN (ROBITUSSIN DM) 100-10 MG/5ML SYRUP    Take 5 mLs by mouth every 4 hours as needed for cough    LEVETIRACETAM (KEPPRA) 750 MG TABLET    Take 1 tablet (750 mg) by mouth 2 times daily    LORATADINE (CLARITIN) 10 MG TABLET    Take 1 tablet (10 mg) by mouth daily    LOSARTAN (COZAAR) 25 MG TABLET    Take 1 tablet (25 mg) by mouth daily    MELATONIN 3 MG TABLET    Take 1 tablet (3 mg) by mouth At Bedtime    METOPROLOL (TOPROL-XL) 25 MG 24 HR TABLET    Take 1 tablet (25 mg) by mouth every evening    METOPROLOL (TOPROL-XL) 50 MG 24 HR TABLET    Take 1 tablet (50 mg) by mouth daily    NITROGLYCERIN (NITROSTAT) 0.4 MG SL TABLET    Place 1 tablet (0.4 mg) under the tongue every 5 minutes as needed for chest pain    NYSTATIN (MYCOSTATIN) 346377 UNIT/GM POWD    Apply to affected areas of skin in the groin and on the scrotum three times a day as needed.    OLOPATADINE (PATANOL) 0.1 % OPHTHALMIC SOLUTION    Place 1 drop into both eyes 2 times daily    ORDER FOR DME    Equipment being ordered: Lift chair.    Please see indications and  "face-to-face encounter details in 2/3/15 Office Visit note.    ORDER FOR DME    Equipment being ordered: Bilateral leg supports for manual wheelchair.    ORDER FOR DME    Equipment being ordered: Reclining manual w/c with bilateral elevating leg rests.    ORDER FOR DME    Equipment being ordered: Hospital Bed, fully electric.    ORDER FOR DME    Equipment being ordered: Wheelchair, manual.    ORDER FOR DME    Equipment being ordered: Wheelchair, manual, with elevated leg rests and tilt in space back.  Please fax to [facility]; I called them 11/26/16 and they requested the new order.  Face to face is in my 10/26/16 note.    ORDER FOR DME    Equipment being ordered: Cushioned heel boots.    OXYCODONE (ROXICODONE) 5 MG IR TABLET    Take 1 tablet (5 mg) by mouth every 4 hours as needed for moderate to severe pain    PANTOPRAZOLE (PROTONIX) 40 MG ENTERIC COATED TABLET    Take 1 tablet (40 mg) by mouth daily    SENNA-DOCUSATE (SENOKOT-S;PERICOLACE) 8.6-50 MG PER TABLET    Take 1-2 tablets by mouth 2 times daily as needed for constipation    SIMETHICONE (MYLICON) 80 MG CHEWABLE TABLET    Take 1 tablet (80 mg) by mouth 4 times daily    TAMSULOSIN (FLOMAX) 0.4 MG 24 HR CAPSULE    Take 1 capsule (0.4 mg) by mouth daily    THIAMINE HCL (VITAMIN B-1) 100 MG TABS    TAKE 1 TABLET (100 MG) BY MOUTH DAILY    WARFARIN (COUMADIN) 3 MG TABLET    Take 3mg by mouth on Mondays and Thursdays. Take 4.5mg by mouth all other days of the week.   Modified Medications    No medications on file   Discontinued Medications    No medications on file          Allergies   Allergen Reactions     Seasonal Allergies        I have reviewed the Medications, Allergies, Past Medical and Surgical History, and Social History in the Epic system.    Review of Systems   10 pt ROS completed and negative except as noted above in HPI      Physical Exam   BP: (!) 130/98  Pulse: 71  Temp: 99.1  F (37.3  C)  Resp: 18  Height: 172.7 cm (5' 8\")  Weight: 81.6 kg (180 " lb)  SpO2: 100 %  Physical Exam   Constitutional: He is oriented to person, place, and time. No distress.   HENT:   Head: Normocephalic and atraumatic.   Right Ear: External ear normal.   Left Ear: External ear normal.   Mouth/Throat: Oropharynx is clear and moist. No oropharyngeal exudate.   Eyes: Conjunctivae are normal. Pupils are equal, round, and reactive to light. Right eye exhibits no discharge. Left eye exhibits no discharge.   Neck: Normal range of motion. Neck supple.   Cardiovascular: Normal rate and intact distal pulses.    Pulmonary/Chest: Effort normal. No respiratory distress. He has no wheezes. He has no rales.   Abdominal: Soft. He exhibits no distension. There is no tenderness. There is no rebound and no guarding.   LVAD   Musculoskeletal: Normal range of motion. He exhibits no edema.   Neurological: He is alert and oriented to person, place, and time. He exhibits normal muscle tone.   Skin: Skin is warm and dry. He is not diaphoretic.   Nursing note and vitals reviewed.      ED Course     ED Course     Procedures             Critical Care time:  none               Labs Ordered and Resulted from Time of ED Arrival Up to the Time of Departure from the ED   CBC WITH PLATELETS DIFFERENTIAL - Abnormal; Notable for the following:        Result Value    RBC Count 4.27 (*)     RDW 16.9 (*)     All other components within normal limits   INR - Abnormal; Notable for the following:     INR 2.51 (*)     All other components within normal limits   COMPREHENSIVE METABOLIC PANEL - Abnormal; Notable for the following:     Glucose 188 (*)     Creatinine 1.78 (*)     GFR Estimate 38 (*)     GFR Estimate If Black 46 (*)     Albumin 3.3 (*)     All other components within normal limits   LIPASE   LACTIC ACID WHOLE BLOOD            Assessments & Plan (with Medical Decision Making)   1.  Vomiting and diarrhea    68 yo M with an LVAD who presents with vomiting and diarrhea.  LVAD is functioning normally, parameters  confirmed with the LVAD coordinator.  Labs also appear normal with WBC of 9.1, lactic acid of 1.5 and INR is 2.51.  Cr is baseline at 1.78.  Patient felt improved with IV dilaudid.  Exam is benign and CT Abd/P reviewed from 3 days ago is normal.  I suspect an infectious process explaining his vomiting and diarrhea. No evidence of obstruction or other serious pathology.   Patient was discharged with a stool collection kit to return to his primary doctor.          I have reviewed the nursing notes.    I have reviewed the findings, diagnosis, plan and need for follow up with the patient.    New Prescriptions    No medications on file       Final diagnoses:   None       5/2/2017   [facility], [place], EMERGENCY DEPARTMENT     Hero Solomon MD  05/07/17 [id number]

## 2017-05-02 NOTE — ED AVS SNAPSHOT
[facility], [place], Emergency Department    [address] [address]    Phone:  [phone]                                       Sohan Rendon   MRN: [id number]    Department:  [facility], Emergency Department   Date of Visit:  5/2/2017           After Visit Summary Signature Page     I have received my discharge instructions, and my questions have been answered. I have discussed any challenges I see with this plan with the nurse or doctor.    ..........................................................................................................................................  Patient/Patient Representative Signature      ..........................................................................................................................................  Patient Representative Print Name and Relationship to Patient    ..................................................               ................................................  Date                                            Time    ..........................................................................................................................................  Reviewed by Signature/Title    ...................................................              ..............................................  Date                                                            Time

## 2017-05-03 ENCOUNTER — CARE COORDINATION (OUTPATIENT)
Dept: GERIATRIC MEDICINE | Facility: CLINIC | Age: 67
End: 2017-05-03

## 2017-05-03 ENCOUNTER — APPOINTMENT (OUTPATIENT)
Dept: CT IMAGING | Facility: CLINIC | Age: 67
DRG: 408 | End: 2017-05-03
Attending: INTERNAL MEDICINE
Payer: COMMERCIAL

## 2017-05-03 ENCOUNTER — OFFICE VISIT (OUTPATIENT)
Dept: INTERPRETER SERVICES | Facility: CLINIC | Age: 67
End: 2017-05-03

## 2017-05-03 ENCOUNTER — APPOINTMENT (OUTPATIENT)
Dept: ULTRASOUND IMAGING | Facility: CLINIC | Age: 67
DRG: 408 | End: 2017-05-03
Attending: EMERGENCY MEDICINE
Payer: COMMERCIAL

## 2017-05-03 ENCOUNTER — APPOINTMENT (OUTPATIENT)
Dept: GENERAL RADIOLOGY | Facility: CLINIC | Age: 67
DRG: 408 | End: 2017-05-03
Attending: EMERGENCY MEDICINE
Payer: COMMERCIAL

## 2017-05-03 PROBLEM — R10.11 RUQ PAIN: Status: ACTIVE | Noted: 2017-05-03

## 2017-05-03 PROBLEM — R10.9 ABDOMINAL PAIN: Status: ACTIVE | Noted: 2017-05-03

## 2017-05-03 LAB
ALBUMIN UR-MCNC: 10 MG/DL
AMYLASE SERPL-CCNC: 34 U/L (ref 30–110)
ANION GAP SERPL CALCULATED.3IONS-SCNC: 12 MMOL/L (ref 3–14)
APPEARANCE UR: CLEAR
BASOPHILS # BLD AUTO: 0 10E9/L (ref 0–0.2)
BASOPHILS NFR BLD AUTO: 0.1 %
BILIRUB UR QL STRIP: NEGATIVE
BUN SERPL-MCNC: 14 MG/DL (ref 7–30)
CALCIUM SERPL-MCNC: 7.9 MG/DL (ref 8.5–10.1)
CHLORIDE SERPL-SCNC: 109 MMOL/L (ref 94–109)
CO2 SERPL-SCNC: 15 MMOL/L (ref 20–32)
COLOR UR AUTO: YELLOW
CREAT SERPL-MCNC: 1.45 MG/DL (ref 0.66–1.25)
DIFFERENTIAL METHOD BLD: ABNORMAL
EOSINOPHIL # BLD AUTO: 0.1 10E9/L (ref 0–0.7)
EOSINOPHIL NFR BLD AUTO: 0.4 %
ERYTHROCYTE [DISTWIDTH] IN BLOOD BY AUTOMATED COUNT: 16.9 % (ref 10–15)
ERYTHROCYTE [SEDIMENTATION RATE] IN BLOOD BY WESTERGREN METHOD: 12 MM/H (ref 0–20)
GFR SERPL CREATININE-BSD FRML MDRD: 49 ML/MIN/1.7M2
GLUCOSE SERPL-MCNC: 193 MG/DL (ref 70–99)
GLUCOSE UR STRIP-MCNC: 70 MG/DL
GRAM STN SPEC: NORMAL
HCT VFR BLD AUTO: 39.4 % (ref 40–53)
HGB BLD-MCNC: 13.2 G/DL (ref 13.3–17.7)
HGB FREE PLAS-MCNC: 30 MG/DL
HGB UR QL STRIP: ABNORMAL
IMM GRANULOCYTES # BLD: 0.1 10E9/L (ref 0–0.4)
IMM GRANULOCYTES NFR BLD: 0.3 %
KETONES UR STRIP-MCNC: NEGATIVE MG/DL
LDH SERPL L TO P-CCNC: 717 U/L (ref 85–227)
LEUKOCYTE ESTERASE UR QL STRIP: NEGATIVE
LIPASE SERPL-CCNC: 47 U/L (ref 73–393)
LYMPHOCYTES # BLD AUTO: 1.6 10E9/L (ref 0.8–5.3)
LYMPHOCYTES NFR BLD AUTO: 9.9 %
Lab: NORMAL
MCH RBC QN AUTO: 32.4 PG (ref 26.5–33)
MCHC RBC AUTO-ENTMCNC: 33.5 G/DL (ref 31.5–36.5)
MCV RBC AUTO: 97 FL (ref 78–100)
MICRO REPORT STATUS: NORMAL
MONOCYTES # BLD AUTO: 1.5 10E9/L (ref 0–1.3)
MONOCYTES NFR BLD AUTO: 8.9 %
MUCOUS THREADS #/AREA URNS LPF: PRESENT /LPF
NEUTROPHILS # BLD AUTO: 13.1 10E9/L (ref 1.6–8.3)
NEUTROPHILS NFR BLD AUTO: 80.4 %
NITRATE UR QL: NEGATIVE
NRBC # BLD AUTO: 0 10*3/UL
NRBC BLD AUTO-RTO: 0 /100
PH UR STRIP: 6 PH (ref 5–7)
PLATELET # BLD AUTO: 163 10E9/L (ref 150–450)
POTASSIUM SERPL-SCNC: 4.4 MMOL/L (ref 3.4–5.3)
PROCALCITONIN SERPL-MCNC: NORMAL NG/ML
RBC # BLD AUTO: 4.07 10E12/L (ref 4.4–5.9)
RBC #/AREA URNS AUTO: 2 /HPF (ref 0–2)
SODIUM SERPL-SCNC: 136 MMOL/L (ref 133–144)
SP GR UR STRIP: 1.01 (ref 1–1.03)
SPECIMEN SOURCE: NORMAL
SQUAMOUS #/AREA URNS AUTO: <1 /HPF (ref 0–1)
TROPONIN I SERPL-MCNC: NORMAL UG/L (ref 0–0.04)
TROPONIN I SERPL-MCNC: NORMAL UG/L (ref 0–0.04)
URN SPEC COLLECT METH UR: ABNORMAL
UROBILINOGEN UR STRIP-MCNC: NORMAL MG/DL (ref 0–2)
WBC # BLD AUTO: 16.3 10E9/L (ref 4–11)
WBC #/AREA URNS AUTO: 1 /HPF (ref 0–2)

## 2017-05-03 PROCEDURE — 85025 COMPLETE CBC W/AUTO DIFF WBC: CPT | Performed by: INTERNAL MEDICINE

## 2017-05-03 PROCEDURE — 83051 HEMOGLOBIN PLASMA: CPT | Performed by: INTERNAL MEDICINE

## 2017-05-03 PROCEDURE — 25000128 H RX IP 250 OP 636: Performed by: EMERGENCY MEDICINE

## 2017-05-03 PROCEDURE — 36415 COLL VENOUS BLD VENIPUNCTURE: CPT | Performed by: INTERNAL MEDICINE

## 2017-05-03 PROCEDURE — 87040 BLOOD CULTURE FOR BACTERIA: CPT | Performed by: EMERGENCY MEDICINE

## 2017-05-03 PROCEDURE — 93750 INTERROGATION VAD IN PERSON: CPT

## 2017-05-03 PROCEDURE — T1013 SIGN LANG/ORAL INTERPRETER: HCPCS | Mod: U3

## 2017-05-03 PROCEDURE — 80048 BASIC METABOLIC PNL TOTAL CA: CPT | Performed by: INTERNAL MEDICINE

## 2017-05-03 PROCEDURE — 87070 CULTURE OTHR SPECIMN AEROBIC: CPT | Performed by: INTERNAL MEDICINE

## 2017-05-03 PROCEDURE — G0378 HOSPITAL OBSERVATION PER HR: HCPCS

## 2017-05-03 PROCEDURE — 25000125 ZZHC RX 250: Performed by: EMERGENCY MEDICINE

## 2017-05-03 PROCEDURE — S0028 INJECTION, FAMOTIDINE, 20 MG: HCPCS | Performed by: EMERGENCY MEDICINE

## 2017-05-03 PROCEDURE — 71020 XR CHEST 2 VW: CPT

## 2017-05-03 PROCEDURE — 83690 ASSAY OF LIPASE: CPT | Performed by: INTERNAL MEDICINE

## 2017-05-03 PROCEDURE — 74176 CT ABD & PELVIS W/O CONTRAST: CPT

## 2017-05-03 PROCEDURE — 81001 URINALYSIS AUTO W/SCOPE: CPT | Performed by: INTERNAL MEDICINE

## 2017-05-03 PROCEDURE — 83615 LACTATE (LD) (LDH) ENZYME: CPT | Performed by: INTERNAL MEDICINE

## 2017-05-03 PROCEDURE — 85652 RBC SED RATE AUTOMATED: CPT | Performed by: INTERNAL MEDICINE

## 2017-05-03 PROCEDURE — 25000125 ZZHC RX 250: Performed by: INTERNAL MEDICINE

## 2017-05-03 PROCEDURE — 25000132 ZZH RX MED GY IP 250 OP 250 PS 637: Performed by: INTERNAL MEDICINE

## 2017-05-03 PROCEDURE — 84484 ASSAY OF TROPONIN QUANT: CPT | Performed by: INTERNAL MEDICINE

## 2017-05-03 PROCEDURE — 76705 ECHO EXAM OF ABDOMEN: CPT

## 2017-05-03 PROCEDURE — 25000128 H RX IP 250 OP 636: Performed by: INTERNAL MEDICINE

## 2017-05-03 PROCEDURE — 82150 ASSAY OF AMYLASE: CPT | Performed by: INTERNAL MEDICINE

## 2017-05-03 PROCEDURE — 99220 ZZC INITIAL OBSERVATION CARE,LEVL III: CPT | Mod: AI | Performed by: INTERNAL MEDICINE

## 2017-05-03 PROCEDURE — 84145 PROCALCITONIN (PCT): CPT | Performed by: INTERNAL MEDICINE

## 2017-05-03 PROCEDURE — 87205 SMEAR GRAM STAIN: CPT | Performed by: INTERNAL MEDICINE

## 2017-05-03 RX ORDER — AMOXICILLIN 250 MG
1-2 CAPSULE ORAL 2 TIMES DAILY PRN
Status: DISCONTINUED | OUTPATIENT
Start: 2017-05-03 | End: 2017-05-10

## 2017-05-03 RX ORDER — LORATADINE 10 MG/1
10 TABLET ORAL DAILY
Status: DISCONTINUED | OUTPATIENT
Start: 2017-05-03 | End: 2017-05-15 | Stop reason: HOSPADM

## 2017-05-03 RX ORDER — TAMSULOSIN HYDROCHLORIDE 0.4 MG/1
0.4 CAPSULE ORAL DAILY
Status: DISCONTINUED | OUTPATIENT
Start: 2017-05-03 | End: 2017-05-09

## 2017-05-03 RX ORDER — OXYCODONE HYDROCHLORIDE 5 MG/1
5 TABLET ORAL EVERY 4 HOURS PRN
Status: DISCONTINUED | OUTPATIENT
Start: 2017-05-03 | End: 2017-05-03

## 2017-05-03 RX ORDER — METOPROLOL SUCCINATE 50 MG/1
50 TABLET, EXTENDED RELEASE ORAL DAILY
Status: DISCONTINUED | OUTPATIENT
Start: 2017-05-03 | End: 2017-05-15 | Stop reason: HOSPADM

## 2017-05-03 RX ORDER — PANTOPRAZOLE SODIUM 40 MG/1
80 TABLET, DELAYED RELEASE ORAL
Status: DISCONTINUED | OUTPATIENT
Start: 2017-05-04 | End: 2017-05-03

## 2017-05-03 RX ORDER — ATORVASTATIN CALCIUM 10 MG/1
10 TABLET, FILM COATED ORAL DAILY
Status: DISCONTINUED | OUTPATIENT
Start: 2017-05-03 | End: 2017-05-15 | Stop reason: HOSPADM

## 2017-05-03 RX ORDER — ALLOPURINOL 100 MG/1
100 TABLET ORAL DAILY
Status: DISCONTINUED | OUTPATIENT
Start: 2017-05-03 | End: 2017-05-15 | Stop reason: HOSPADM

## 2017-05-03 RX ORDER — ONDANSETRON 2 MG/ML
4 INJECTION INTRAMUSCULAR; INTRAVENOUS EVERY 6 HOURS PRN
Status: DISCONTINUED | OUTPATIENT
Start: 2017-05-03 | End: 2017-05-08

## 2017-05-03 RX ORDER — ONDANSETRON 4 MG/1
4 TABLET, ORALLY DISINTEGRATING ORAL EVERY 6 HOURS PRN
Status: DISCONTINUED | OUTPATIENT
Start: 2017-05-03 | End: 2017-05-08

## 2017-05-03 RX ORDER — LOSARTAN POTASSIUM 25 MG/1
25 TABLET ORAL DAILY
Status: DISCONTINUED | OUTPATIENT
Start: 2017-05-03 | End: 2017-05-10

## 2017-05-03 RX ORDER — PANTOPRAZOLE SODIUM 40 MG/1
40 TABLET, DELAYED RELEASE ORAL
Status: DISCONTINUED | OUTPATIENT
Start: 2017-05-03 | End: 2017-05-07

## 2017-05-03 RX ORDER — BISACODYL 5 MG
5 TABLET, DELAYED RELEASE (ENTERIC COATED) ORAL DAILY PRN
Status: DISCONTINUED | OUTPATIENT
Start: 2017-05-03 | End: 2017-05-15 | Stop reason: HOSPADM

## 2017-05-03 RX ORDER — ACETAMINOPHEN 325 MG/1
650 TABLET ORAL EVERY 6 HOURS PRN
Status: DISCONTINUED | OUTPATIENT
Start: 2017-05-03 | End: 2017-05-15 | Stop reason: HOSPADM

## 2017-05-03 RX ORDER — PANTOPRAZOLE SODIUM 40 MG/1
40 TABLET, DELAYED RELEASE ORAL
Status: DISCONTINUED | OUTPATIENT
Start: 2017-05-03 | End: 2017-05-03

## 2017-05-03 RX ORDER — LANOLIN ALCOHOL/MO/W.PET/CERES
3 CREAM (GRAM) TOPICAL AT BEDTIME
Status: DISCONTINUED | OUTPATIENT
Start: 2017-05-03 | End: 2017-05-15 | Stop reason: HOSPADM

## 2017-05-03 RX ORDER — LEVETIRACETAM 750 MG/1
750 TABLET ORAL 2 TIMES DAILY
Status: DISCONTINUED | OUTPATIENT
Start: 2017-05-03 | End: 2017-05-15 | Stop reason: HOSPADM

## 2017-05-03 RX ORDER — LANOLIN ALCOHOL/MO/W.PET/CERES
100 CREAM (GRAM) TOPICAL DAILY
Status: DISCONTINUED | OUTPATIENT
Start: 2017-05-03 | End: 2017-05-15 | Stop reason: HOSPADM

## 2017-05-03 RX ORDER — OXYCODONE HYDROCHLORIDE 5 MG/1
5 TABLET ORAL EVERY 4 HOURS PRN
Status: DISCONTINUED | OUTPATIENT
Start: 2017-05-03 | End: 2017-05-15 | Stop reason: HOSPADM

## 2017-05-03 RX ORDER — NALOXONE HYDROCHLORIDE 0.4 MG/ML
.1-.4 INJECTION, SOLUTION INTRAMUSCULAR; INTRAVENOUS; SUBCUTANEOUS
Status: DISCONTINUED | OUTPATIENT
Start: 2017-05-03 | End: 2017-05-15 | Stop reason: HOSPADM

## 2017-05-03 RX ORDER — LIDOCAINE 40 MG/G
CREAM TOPICAL
Status: DISCONTINUED | OUTPATIENT
Start: 2017-05-03 | End: 2017-05-15

## 2017-05-03 RX ORDER — SODIUM CHLORIDE 9 MG/ML
1000 INJECTION, SOLUTION INTRAVENOUS CONTINUOUS
Status: DISCONTINUED | OUTPATIENT
Start: 2017-05-03 | End: 2017-05-03

## 2017-05-03 RX ORDER — SIMETHICONE 80 MG
80 TABLET,CHEWABLE ORAL 4 TIMES DAILY PRN
Status: DISCONTINUED | OUTPATIENT
Start: 2017-05-03 | End: 2017-05-15 | Stop reason: HOSPADM

## 2017-05-03 RX ORDER — METOPROLOL SUCCINATE 25 MG/1
25 TABLET, EXTENDED RELEASE ORAL EVERY EVENING
Status: DISCONTINUED | OUTPATIENT
Start: 2017-05-03 | End: 2017-05-15 | Stop reason: HOSPADM

## 2017-05-03 RX ADMIN — LEVETIRACETAM 750 MG: 750 TABLET, FILM COATED ORAL at 20:25

## 2017-05-03 RX ADMIN — ACETAMINOPHEN 650 MG: 325 TABLET, FILM COATED ORAL at 09:20

## 2017-05-03 RX ADMIN — WARFARIN SODIUM 1.5 MG: 3 TABLET ORAL at 17:41

## 2017-05-03 RX ADMIN — SODIUM CHLORIDE 1000 ML: 9 INJECTION, SOLUTION INTRAVENOUS at 01:48

## 2017-05-03 RX ADMIN — PANTOPRAZOLE SODIUM 40 MG: 40 INJECTION, POWDER, FOR SOLUTION INTRAVENOUS at 01:48

## 2017-05-03 RX ADMIN — SODIUM CHLORIDE 1000 ML: 9 INJECTION, SOLUTION INTRAVENOUS at 03:50

## 2017-05-03 RX ADMIN — ONDANSETRON 4 MG: 2 INJECTION INTRAMUSCULAR; INTRAVENOUS at 03:54

## 2017-05-03 RX ADMIN — LIDOCAINE HYDROCHLORIDE 30 ML: 20 SOLUTION ORAL; TOPICAL at 13:23

## 2017-05-03 RX ADMIN — Medication 100 MG: at 08:58

## 2017-05-03 RX ADMIN — MELATONIN TAB 3 MG 3 MG: 3 TAB at 21:53

## 2017-05-03 RX ADMIN — ATORVASTATIN CALCIUM 10 MG: 10 TABLET, FILM COATED ORAL at 08:58

## 2017-05-03 RX ADMIN — METOPROLOL SUCCINATE 50 MG: 50 TABLET, EXTENDED RELEASE ORAL at 08:59

## 2017-05-03 RX ADMIN — ALLOPURINOL 100 MG: 100 TABLET ORAL at 08:58

## 2017-05-03 RX ADMIN — TAMSULOSIN HYDROCHLORIDE 0.4 MG: 0.4 CAPSULE ORAL at 08:58

## 2017-05-03 RX ADMIN — MELATONIN TAB 3 MG 3 MG: 3 TAB at 03:54

## 2017-05-03 RX ADMIN — ACETAMINOPHEN 650 MG: 325 TABLET, FILM COATED ORAL at 15:22

## 2017-05-03 RX ADMIN — LEVETIRACETAM 750 MG: 750 TABLET, FILM COATED ORAL at 08:58

## 2017-05-03 RX ADMIN — LORATADINE 10 MG: 10 TABLET ORAL at 08:57

## 2017-05-03 RX ADMIN — PANTOPRAZOLE SODIUM 40 MG: 40 TABLET, DELAYED RELEASE ORAL at 08:58

## 2017-05-03 RX ADMIN — METOPROLOL SUCCINATE 25 MG: 25 TABLET, EXTENDED RELEASE ORAL at 20:26

## 2017-05-03 RX ADMIN — PANTOPRAZOLE SODIUM 40 MG: 40 TABLET, DELAYED RELEASE ORAL at 15:22

## 2017-05-03 RX ADMIN — FAMOTIDINE 20 MG: 10 INJECTION INTRAVENOUS at 02:24

## 2017-05-03 RX ADMIN — ACETAMINOPHEN 650 MG: 325 TABLET, FILM COATED ORAL at 03:55

## 2017-05-03 RX ADMIN — OXYCODONE HYDROCHLORIDE 5 MG: 5 TABLET ORAL at 17:39

## 2017-05-03 RX ADMIN — OXYCODONE HYDROCHLORIDE 5 MG: 5 TABLET ORAL at 21:58

## 2017-05-03 RX ADMIN — LOSARTAN POTASSIUM 25 MG: 25 TABLET, FILM COATED ORAL at 08:58

## 2017-05-03 ASSESSMENT — PAIN DESCRIPTION - DESCRIPTORS: DESCRIPTORS: SHARP

## 2017-05-03 ASSESSMENT — ENCOUNTER SYMPTOMS
COUGH: 1
FATIGUE: 1
ABDOMINAL DISTENTION: 1

## 2017-05-03 NOTE — ED PROVIDER NOTES
History     Chief Complaint   Patient presents with     Abdominal Pain     The history is provided by the patient, a relative and medical records. The history is limited by a language barrier and the condition of the patient (previous stroke).  used: Daughter provides most of hx (no  needed for her), requested to translate for pt given hx baseline and knows how he communicates well afer stroke, etc. Pt responding appropriately to my questions/requests as well.      Sohan Rendon is a 67 year old male with a medical history significant for CHF currently with destination LVAD, past right-sided MCA and left sided hemiparesis, dysphasia, esophagitis, GERD, and hypertension who presents to the emergency department with his daughter for evaluation of RUQ abdominal pain and abdominal distension.     Patient has been seen in the ED twice in the last week, yesterday and 4 days ago (4/29) with similar presentation of epigastric pain. His abdominal CT from 4/29 reportedly not show obstruction or other acute pathology that they thought would explain symptoms. Labs both days did not show any acute findings to explain symptoms. Thought on visit yesterday was going to be some sort of infectious GI etiology and were going to try symptomatic management.    Pt brought in by daughter for ongoing abdominal discomfort, and fullness/distension. Daughter reports the patient had lots diarrhea yesterday with no blood in stool. He has not been eating or drinking much since yesterday. She notes is RUQ pain is worse, exacerbated by movements and deep breaths. Nothing improves. Patient's diarrhea has not been present today, no BMS, not sure if he is passing gas. Bloating/fullness is same as yesterday, just no better. Appetite is worse today.He denies urinary symptoms. No fever. She notes the patient has been spitting white sputum. See in EMR has some chronic cough, but the thicker white sputum was new per  daughter. No fevers, no urine symptoms, has not had any shortness of breath or visible increased work of breathing. No falls or traumas, no vomiting. No syncope or near syncope. No CP. No change in LVAD #'s from usual baseline, no alarms.    Patient's LVAD numbers in the ED are as follows:   Power:5.2-5.5  Pulmonary Index; 5.8  Pulmonary Flow: 4.1  Speed:8800 RPM    EXAMINATION: CT ABDOMEN PELVIS W/O CONTRAST, 4/29/2017 10:20 AM  IMPRESSION:   1. No acute intra-abdominal pathology identified. No bowel  obstruction.  2. Nonobstructing right renal stones appear similar to previous  I have reviewed the Medications, Allergies, Past Medical and Surgical History, and Social History in the [employer] system.    PAST MEDICAL HISTORY:   Past Medical History:   Diagnosis Date     Acute right MCA stroke (H) 6/2013    With L sided hemiparesis      Anemia of chronic disease     Baseline Hb 8-9     BPH (benign prostatic hyperplasia)      CHF (congestive heart failure), NYHA class IV (H) 10/9/2012    s/p HeartMate II.  Was on milrinone and dobutamine prior to LVAD      CKD (chronic kidney disease)     Baseline Cr=     Clostridium difficile colitis 12/2012      Dysphagia     PEG tube placed in 2012.  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  Normal carotid duplex 3/2014.       Esophagitis 12/2012    EGD with esophagitis and multiple douenal ulcers     Fracture of femoral neck, right, closed 2/3/2015    Presumed pathologic as patient is non-weight-bearing and suffered no trauma.  Family declined operative repair during hospital stay 1/23 - 1/30/15.     Gastric and duodenal angiodysplasia with hemorrhage 6/18/2015     GERD (gastroesophageal reflux disease)      Gout      HTN (hypertension)     LDL=59 (3/29/2014)     Hyperlipaemia      Ischemic cardiomyopathy      Mitral regurgitation     s/p MVR with Carbomedics ring      Myocardial  infarction (H) 1998    In [facility] without intervention      Nonsenile cataract     BE     PFO (patent foramen ovale)     s/p closure (10/9/2012)     TB lung, latent     s/p 9 months INH in 2012        PAST SURGICAL HISTORY:   Past Surgical History:   Procedure Laterality Date     C CABG, VEIN, FIVE  2001     CATARACT IOL, RT/LT Right 11/19/2015     ESOPHAGOSCOPY, GASTROSCOPY, DUODENOSCOPY (EGD), COMBINED N/A 6/10/2015    Procedure: COMBINED ESOPHAGOSCOPY, GASTROSCOPY, DUODENOSCOPY (EGD);  Surgeon: Edu Jenkins MD;  Location: [facility]     ESOPHAGOSCOPY, GASTROSCOPY, DUODENOSCOPY (EGD), COMBINED N/A 6/15/2015    Procedure: COMBINED ESOPHAGOSCOPY, GASTROSCOPY, DUODENOSCOPY (EGD);  Surgeon: Yury Bonner MD;  Location: [facility] OR     H INSERT ICD  2/10/2011    And pacemaker.  Not BiV     INSERT VENTRICULAR ASSIST DEVICE LEFT (HEARTMATE II)  10/9/2012     IR GASTRO JEJUNOSTOMY TUBE PLACEMENT       PHACOEMULSIFICATION CLEAR CORNEA WITH STANDARD INTRAOCULAR LENS IMPLANT Right 11/19/2015    Procedure: PHACOEMULSIFICATION CLEAR CORNEA WITH STANDARD INTRAOCULAR LENS IMPLANT;  Surgeon: Violeta Cosme MD;  Location: [facility] OR     REPAIR PATENT FORAMEN OVALE  10/9/2012     REPAIR VALVE MITRAL  2/9/2012    28 mm Carbomedics 2/3 ring        FAMILY HISTORY:   Family History   Problem Relation Age of Onset     Family History Negative No family hx of      Glaucoma No family hx of      Macular Degeneration No family hx of      CANCER No family hx of      no skin cancer       SOCIAL HISTORY:   Social History   Substance Use Topics     Smoking status: Former Smoker     Smokeless tobacco: Former User     Alcohol use No     No current facility-administered medications for this encounter.      Current Outpatient Prescriptions   Medication     ondansetron (ZOFRAN ODT) 4 MG ODT tab     atorvastatin (LIPITOR) 10 MG tablet     warfarin (COUMADIN) 3 MG tablet     calcium carbonate-vitamin D 500-400 MG-UNIT TABS per tablet     simethicone  (MYLICON) 80 MG chewable tablet     oxyCODONE (ROXICODONE) 5 MG IR tablet     melatonin 3 MG tablet     Thiamine HCl (VITAMIN B-1) 100 MG TABS     order for DME     guaiFENesin-dextromethorphan (ROBITUSSIN DM) 100-10 MG/5ML syrup     losartan (COZAAR) 25 MG tablet     allopurinol (ZYLOPRIM) 100 MG tablet     order for DME     pantoprazole (PROTONIX) 40 MG enteric coated tablet     tamsulosin (FLOMAX) 0.4 MG 24 hr capsule     senna-docusate (SENOKOT-S;PERICOLACE) 8.6-50 MG per tablet     order for DME     order for DME     azelastine (OPTIVAR) 0.05 % SOLN     loratadine (CLARITIN) 10 MG tablet     levETIRAcetam (KEPPRA) 750 MG tablet     ferrous sulfate (IRON) 325 (65 FE) MG tablet     ascorbic acid (VITAMIN C) 500 MG tablet     metoprolol (TOPROL-XL) 25 MG 24 hr tablet     metoprolol (TOPROL-XL) 50 MG 24 hr tablet     blood glucose monitoring (NO BRAND SPECIFIED) test strip     order for DME     nystatin (MYCOSTATIN) 614073 UNIT/GM POWD     carboxymethylcellulose (REFRESH PLUS) 0.5 % SOLN     order for DME     olopatadine (PATANOL) 0.1 % ophthalmic solution     nitroglycerin (NITROSTAT) 0.4 MG SL tablet     blood glucose monitoring (NO BRAND SPECIFIED) lancets     ORDER FOR DME     acetaminophen (TYLENOL) 325 MG tablet     bisacodyl (DULCOLAX) 5 MG EC tablet     Facility-Administered Medications Ordered in Other Encounters   Medication     oxyCODONE (ROXICODONE) 5 MG immediate release tablet        Allergies   Allergen Reactions     Seasonal Allergies        Review of Systems   Reason unable to perform ROS: Pt reports no other symptoms, but not very verbose, cannot exclude other symptoms.   Constitutional: Positive for fatigue. Negative for chills and fever.   HENT: Negative for drooling and trouble swallowing.    Eyes: Negative for pain and visual disturbance.   Respiratory: Positive for cough (w/ sputum). Negative for shortness of breath.    Cardiovascular: Negative for chest pain, palpitations and leg swelling.  "       LVAD in place   Gastrointestinal: Positive for abdominal distention (\"full\" ), abdominal pain and diarrhea. Negative for blood in stool, constipation, nausea and vomiting.   Endocrine: Negative for polydipsia and polyuria.   Genitourinary: Negative for dysuria, frequency and hematuria.   Musculoskeletal: Negative for back pain and neck pain.   Skin: Negative for color change and rash.   Allergic/Immunologic: Positive for environmental allergies. Negative for immunocompromised state.   Neurological: Negative for syncope, weakness and headaches.       Physical Exam   BP: 94/82  Pulse: 81  Temp: 97.7  F (36.5  C)  Resp: 16  Height: 172.7 cm (5' 8\")  Weight: 81.6 kg (180 lb)  SpO2: 98 %  Physical Exam     CONSTITUTIONAL: Well-developed and well-nourished. Awake and alert. Non-toxic appearance. No acute distress.   HENT:   - Head: Normocephalic and atraumatic.   - Ears: Hearing and external ear grossly normal.   - Nose: Nose normal. No rhinorrhea. No epistaxis.   - Mouth/Throat: Oropharynx is clear and MMM  EYES: Conjunctivae and lids are normal. No scleral icterus.   NECK: Normal range of motion and phonation normal. Neck supple.  No tracheal deviation, no stridor. No edema or erythema noted.  CARDIOVASCULAR: LVAD in place with expected noise. LVAD wire entry point appears C/D/I, w/o bleeding or drainage.  PULMONARY/CHEST: No apparent increased work of breathing. No appreciable abnormal breath sounds. No accessory muscle usage or stridor. No respiratory distress.  ABDOMEN: Soft, non-distended. Reports RUQ at rest, perhaps a bit worse with palpation. No apparent Guy Sign. No apparent peritoneal findings/ No rigidity, rebound or guarding. Exam in the RUQ somewhat limited as this is near his LVAD wire site.   MUSCULOSKELETAL: Extremities warm and seemingly well perfused. No edema or calf tenderness.  NEUROLOGIC: Awake, alert. Pointing appropriately to where pain located, etc. He displays no obvious tremor.  " Normal tone. No seizure activity. GCS 15  SKIN: Skin is warm and dry. No rash noted. No diaphoresis. No pallor.   PSYCHIATRIC: Normal mood and affect. Speech and behavior normal. Thought processes linear. Cognition and memory are normal.       ED Course     ED Course   Value Comment By Time   WBC: (!) 15.7 Increased from previous Nohelia Arellano MD 05/02 2333   INR: (!) 2.93 Crease from previous Nohelia Arellano MD 05/02 2333   Creatinine: (!) 1.57 Improved from previous Nohelia Arellano MD 05/02 2333   CRP Inflammation: (!) 91.0 Increased from previous Nohelia Arellano MD 05/02 2333   Glucose: (!) 208 (Reviewed) Nohelia Arellano MD 05/03 0118     Procedures       11:01 PM  The patient was seen and examined by Dr. Arellano in Room 6.              Labs Ordered and Resulted from Time of ED Arrival Up to the Time of Departure from the ED - No data to display         Assessments & Plan (with Medical Decision Making)   IMPRESSION: A 67-year-old medically complicated male, PMH is notable for LVAD, COPD, relatively recent acute appendicitis, previous cerebral infarction and seizure, CHF, chronic cough, previous C. diff, CAD ischemic cardiomyopathy (for which he has the LVAD), DM 2, HTN, HLP, esophagitis, who was just in the ED on 4/29 (epigastric discomfort, no acute findings on urine, no acute findings on CT, discussed bowel regimen and discharge) and then again early this morning at about 4:30 AM, presenting then with the addition of vomiting and nonbloody diarrhea with symptoms improved with IV Dilaudid, given his exam was benign.  Did not repeat the CT and if that is most likely related to an infectious etiology was DC'd with stool collection.  Presents today ongoing upper abdominal discomfort, points towards the RUQ, bloating and coughed up some sputum today with now resolution of his previous diarrhea but seemingly ongoing discomfort.  No changes in LVAD numbers, etc.    DDX includes but not limited to  hepatobiliary cause, acute cholecystitis, hepatitis less likely given recent laboratory values, occult thoracic cause such as a occult pneumonia.  Patient is anticoagulated with warfarin and I think it unlikely to be PE as a cause of his symptoms given INRs have been in the therapeutic range.  Gastritis/PUD, irritation or trouble with the LVAD itself (though functioning well) also considered.    PLAN: I reviewed the imaging myself and while there is a lot of artifact because of the patient's LVAD and wondered if there some abnormal findings near the patient's gallbladder.  Laboratory evaluations are already drawn showed slight elevation of white count which making me think an infectious process and so we'll get a RUQ ultrasound to evaluate for cholecystitis, chest x-ray to evaluate for pneumonia and recheck.  We will add on cultures and continue to evaluate and manage symptoms.    RESULTS:  - Labs: Leukocytosis with elevated inflammatory markers, normal lactate, negative troponin. Renal fx near baseline. Otherwise, see ED Course section above for particular pertinent findings and comments  - Urine: No sample yet provided  - Imaging: Images and written preliminary reports reviewed by myself and revealed:  --- CXR: Perihilar opacities, reportedly related to low lung volumes, small pleural effusion  --- Abdominal US: GB sludge, with mild nonspecific GB wall thickening w/o stone. Per prelim did not have the other findings for acute cholecystitis. Formal read pending.     INTERVENTIONS:   - IVF  - IV Famotidine  - IV Protonix    RE-EVALUATION:  - The patient's symptoms were grossly unchanged while in ED. No significant change in vitals.     DISCUSSIONS:  - w/ IM: Discussed case and available results. They will admit for further evaluation/management, F/U on pending results, eval further. Agree with no Abx at this point, as unsure cause of symptoms/leukocytosis, hemodynamically stable, normal lactate, etc. They will  follow and manage accordingly.   - w/ Patient/family: I have reviewed the available findings, plan with the patient and his family. They expressed understanding and agreement with this plan. All questions answered to the best of our ability at this time.     DISPOSITION/PLANNING:  FINAL IMPRESSION:   --- Abdominal pain, bloating, leukocytosis, cough w/ sputum, nonspecific gallbladder findings on US, diarrhea (improved), with worsening labs/leukocytosis, non-improving, but non-toxic and hemodynamically stable clinical appearance  --- DDx still broad, including GB source, C.diff, bacteremia, LVAD infection, UTI, occult PNA, et al.  DISPOSITION: Admit to IM for further evaluation and management  RECOMMENDATIONS:  --- Pending: Blood cultures, C.diff, enteric panel (no sample yet provided), urine studies (no sample yet provided), final Radiology (CXR and US) reports/results  --- Serial abdominal exams  --- Consider repeat CT A/P (at which point would probably recommend CT of the chest as well)  --- Surgery/GI consults for GB      ______________________________________________________________________________    - I have reviewed the available nursing notes.      New Prescriptions    No medications on file       Final diagnoses:   None     Karina BERRIOS , am serving as a trained medical scribe to document services personally performed by Nohelia Arellano MD, based on the provider's statements to me.   Nohelia BERRIOS MD, was physically present and have reviewed and verified the accuracy of this note documented by Karina Jones.    5/2/2017   [facility], [place], EMERGENCY DEPARTMENT     Nohelia Arellano MD  05/03/17 3380

## 2017-05-03 NOTE — PROGRESS NOTES
SPIRITUAL HEALTH SERVICES  SPIRITUAL ASSESSMENT Progress Note  [facility] 6B    Patient was being translated with his daughter also present. I asked if he would like a [employer] and he immediately & enthusiastically asked if Cristobal could come to see him. I informed him I would make this request.      Omi Greer  Chaplain Resident  Pager [phone]

## 2017-05-03 NOTE — PROGRESS NOTES
Progress Note  Sohan [facility] MRN: [id number]  Age: 67 year old, : 1950  Primary care provider: Thomas Dill           Subjective     -Nursing notes reviewed; Chart reviewed  -No acute events since admission  -He developed abdominal pain starting on ; went to ED; CT abdomen without pathology; discharged home  -Pain worsening since then; sharp in nature; constant; worse with eating or taking a large breath  -More shortness of breath with exertion over past couple days; new cough with productive sputum  -Has had a few episodes of emesis  -Last bowel movement was over 24 hours ago; was thin/liquid at that time  -No fevers, chills, chest pain, leg pain, nor blood in stool          Objective     Temp: 97.6  F (36.4  C) Temp src: Oral BP: (!) 121/92 Pulse: 64 Heart Rate: 60 Resp: 18 SpO2: 100 % O2 Device: None (Room air) Oxygen Delivery: 2 LPM    General: Lying in bed. Alert and oriented completely. No acute distress   HEENT: arcus senilis; PERRL. EOM intact. Sclera non-icteric. MMM  Resp: Lungs clear to auscultation bilaterally. Breath sounds vesicular   CV: Hum of LVAD audible  Abdominal: Tender to palpation in Epigastrium, RUQ, and moving into RLQ; no peritoneal signs; BS+; mild distention  MSK: No gross abnormalities. Appropriate muscle mass  Skin: No bruising nor rashes in limited exam. No jaundice  Extremities: Trace LE edema  Neurological: CN's II-XII grossly intact. A&O completely            Data:     ROUTINE IP LABS (Last four results)  BMP  Recent Labs  Lab 17  0737    134 138 141   POTASSIUM 4.4 3.8 3.8 3.7   CHLORIDE 109 105 106 109   JOSÉ 7.9* 8.5 8.6 8.6   CO2 15* 20 23 22   BUN 14 15 16 17   CR 1.45* 1.57* 1.78* 1.63*   * 208* 188* 157*     CBC  Recent Labs  Lab 1745 17  0737   WBC 16.3* 15.7* 9.1 7.3   RBC 4.07* 4.34* 4.27* 4.39*   HGB 13.2* 13.7 13.7  14.2   HCT 39.4* 41.3 41.1 42.0   MCV 97 95 96 96   MCH 32.4 31.6 32.1 32.3   MCHC 33.5 33.2 33.3 33.8   RDW 16.9* 16.5* 16.9* 17.0*    179 160 157     INR  Recent Labs  Lab 05/02/17  2247 05/02/17  0442 04/29/17  0737 04/28/17   INR 2.93* 2.51* 2.24* 2.4     Imaging:   IMPRESSION:   1. Inflammatory fat stranding surrounding the second portion of  duodenum with extension along the right anterior pararenal fascia,  likely representing duodenitis. No localized fluid collections or  free air to suggest perforation. Fatty infiltration of the adjacent  head/uncinate process of pancreas without obvious inflammatory  changes.  2. Small amount of fluid and fat stranding surrounding the tip of the  appendix, however significantly improved since CT dated 2/11/2017.  Remainder of appendix has a normal appearance.  3. Mild left renal pelvocaliectasis and left hydroureter, unchanged  since prior study. Tiny nonobstructing calculi within the kidneys  bilaterally. No ureteric calculi.  4. Stable scattered sub-5 mm pulmonary nodules in the right lung since  CT dated 7/19/2014.  5. Small bilateral pleural effusions. Right basal  atelectasis/consolidation. Superimposed infection cannot be excluded.           Assessment and Plan:     67 year old man with chronic systolic heart failure 2/2 to ischemic CM s/p HM II LVAD placement in [place] now Destination Therapy due to multiple CVA's with residual left sided weakness c/b recurrent hemolysis and pump thrombus, CKD, who was recently admitted on 2/11-2/15 for appendicitis, managed conservatively; now presents with right sided abdominal pain.      # RUQ Abdominal pain:   # Duodenitis  RUQ US showed gallbladder sludge with nonspecific mild-wall thickening, but CT showed no evidence of cholecystitis and his exam was not consistent with cholecystitis. No concern for cholangitis at this time with normal LFTs. Hepatitis unlikely given normal LFTs. Pancreatitis unlikely given normal  lipase and amylase. CT abdomen on 4/29 did not show evidence of fat stranding around pancreas (however study was w/o contrast). C. Diff is possible given history of diarrhea, but unlikely without BM in 24 hours. Driveline site looks normal, so suspicion for driveline infection is low. Pulmonary etiology is also a possibility with new productive cough and elevated WBC, but CXR without obvious consolidation. PE unlikely with INR 2.93. Recurrence of appendicitis is another possibility, however improved CT on admission. Given lack of EtOH consumption, tobacco and NSAID use. Suspicious for H.pylori infection causing gastritis with duodenitis seen on CT imaging from 5/3.   - Check for H. Pylori (as gastritis could be contributing to duodenitis)  - Increase PPI to 40mg BID- only for 2 wks  - No more famotidine given PPI use  - Gi cocktail once  - No NSAIDs  - studies pending: sputum Cx, c. Diff and stool enteric pathogen   - Gen surgery consulted for cholecystitis; think it is unlikely; not interested in surgery regardless  - zofran prn for nausea  - No antibiotics given hemodynamic stability; procalcitonin 0.08  - cards 2 consult given LVAD status    ##Chronic  # Chronic systolic heart failure secondary to ischemic CM/ Stage D, NYHA Class III B/ S/p HM II LVAD placement in [place] now DT due to multiple CVAs with residual left sided weakness complicated by recurrent hemolysis and pump thrombus. Focus of care remains on quality of life.    --LVAD settings: PI 6.1, Speed 8800 rpm, Power 5.4 capps  --BB: Metoprolol 50mg am, 25mg qhs   -- ACEI/ ARB: Losartan 25mg daily   --SCD prophylaxis: ICD  --Fluid status: euvolemic.   --INR goal: 1.8-2.3 (reduced in setting of gross hematuria)  --Antiplatelet agents: none  --Atorvastin 10mg PO daily  -- on bedside interrogation, there were no recent LVAD alarms and driveline site looked normal w/o discharge  --       # HTN: currently controlled, no issues with hypotension.   -  cont PTA losartan and metoprolol      # Multiple CVAs with residual and recurrent hemolysis/thrombus: Current baseline with left hemiparesis. No significant rehab potential. Continue coumadin and ASA as above.        # History of seizure: Continue Levetiracetam 750mg PO BID       # Gout: Allopurinol 100mg PO daily       # Constipation: Bowel regimen prn       # BPH: flomax .4mg daily       # Iron Deficiency Anemia: Holding iron and multivitamins while inpatient         ##Other  - PPx: already anticoagulated for DVT/pantoprazole for GI/senna-docusate and bisacodyl for bowel  - FEN: NPO for now  - Code status: Full    Patient was discussed with staff attending, Dr. Frias, who agrees with the above assessment and plan.    Rodrigo Muñoz MD  Internal Medicine Resident, PGY1  [phone]                Physician Attestation   I, Selene Frias, saw this patient with the resident and agree with the resident s findings and plan of care as documented in the resident s note.      I personally reviewed vital signs, medications, labs and imaging.    Key findings: unlikely pna. duodenitis +/- gastritis. hpylori if has a stool. PPI BID for 2 wks and then close f/u at clinic. Pain management to be sure he can eat prior to d/c.    Selene Frias  Date of Service (when I saw the patient): 05/03/17

## 2017-05-03 NOTE — PLAN OF CARE
Problem: Goal Outcome Summary  Goal: Goal Outcome Summary  Outcome: Improving  1. pna ruled out with CT : chest CT completed, shows duodenitis  2. Pain controlled- allowing him to eat and drink : ate 50% of meal, received GI cocktail, Protonix and Tylenol prior to eating, denied pain after eating

## 2017-05-03 NOTE — PHARMACY-ANTICOAGULATION SERVICE
Clinical Pharmacy - Warfarin Dosing Consult     Pharmacy has been consulted to manage this patient s warfarin therapy.  Indication: LVAD/RVAD  Therapy Goal: Other - see comments (1.8-2.3)  Warfarin Prior to Admission: Yes  Warfarin PTA Regimen: 4.5 mg S,Tu,Tu; 3 mg ROW  Significant drug interactions: none    INR   Date Value Ref Range Status   05/02/2017 2.93 (H) 0.86 - 1.14 Final   05/02/2017 2.51 (H) 0.86 - 1.14 Final     Chromogenic Factor 10   Date Value Ref Range Status   08/10/2014 24 (L) 70 - 130 % Final     Comment:     Therapeutic Range:  A Chromogenic Factor 10 level of approximately 20-40%   inversely correlates with an INR of 2-3 for patients receiving Warfarin.   Chromogenic Factor 10 levels below 20% indicate an INR greater than 3 and   levels above 40% indicate an INR less than 2.       Factor 2 Assay   Date Value Ref Range Status   07/21/2014 25 (L) 60 - 140 % Final     Comment:     Analyte Specific Reagents (ASRs) are used in many laboratory tests necessary   for   standard medical care and generally do not require FDA approval.  This test   was   developed and its performance characteristics determined by [facility].  It has not been cleared or approved by   the US Food and Drug Administration.         Recommend warfarin 1.5 mg today.  Pharmacy will monitor Sohan Rendon daily and order warfarin doses to achieve specified goal.      Please contact pharmacy as soon as possible if the warfarin needs to be held for a procedure or if the warfarin goals change.

## 2017-05-03 NOTE — PLAN OF CARE
"Problem: Goal Outcome Summary  Goal: Goal Outcome Summary  Outcome: Improving  /90 (BP Location: Right arm)  Pulse 64  Temp 97.5  F (36.4  C) (Oral)  Resp 16  Ht 1.727 m (5' 8\")  Wt 81.6 kg (180 lb)  SpO2 99%  BMI 27.37 kg/m2     AOx4, assessment and education completed with the use of bedside  and blue  phone.  VSS, afebrile, on room air.  HeartMate II, no alarms this shift, dressing C/D/I, unable to calculate flow.  CT chest/abd completed, sputum culture sent.  C/O RUQ pain that is worse after eating, received GI cocktail prior to lunch, patient states this helped, also taking PRN Tylenol and oxycodone.  Voiding per urinal, no BM this shift.  Family at bedside all day, updated on plan of care with the use of .      "

## 2017-05-03 NOTE — UTILIZATION REVIEW
"  Admission Status; Secondary Review Determination      Under the authority of the Utilization Management Committee, the utilization review process indicated a secondary review on the above patient.  The review outcome is based on review of the medical records, discussions with staff, and applying clinical experience noted on the date of the review.      (X)Observation Status Appropriate - This patient does not meet hospital inpatient criteria and is placed in observation status. If this patient's primary payer is Medicare and was admitted as an inpatient, Condition Code 44 should be used and patient status changed to \"observation\".      RATIONALE FOR DETERMINATION: 67 year old male with a medical history significant for CHF currently with destination LVAD, past right-sided MCA and left sided hemiparesis, dysphasia, esophagitis, GERD, and hypertension who presents to the emergency department with his daughter for evaluation of RUQ abdominal pain and abdominal distension. Patient has been seen in the ED twice in the last week, with similar presentation. His recent abdominal CT 4/29/17 showed no acute pathology.  He was admitted for possible biliary colic/cholecystitis.  There are no plans for surgical intervention at this time.        The severity of illness, intensity of service provided, expected LOS and risk for adverse outcome make the care appropriate for further observation; however, doesn't meet criteria for hospital inpatient admission. This was discussed with attending physician Dr. Frias who concurred with this determination.      The information on this document is developed by the utilization review team in order for the business office to ensure compliance.  This only denotes the appropriateness of proper admission status and does not reflect the quality of care rendered.      The definitions of Inpatient Status and Observation Status used in making the determination above are those provided in the " CMS Coverage Manual, Chapter 1 and Chapter 6, section 70.4.      Sincerely,  Dylon Gilmore MD  [facility]

## 2017-05-03 NOTE — PLAN OF CARE
Problem: Goal Outcome Summary  Goal: Goal Outcome Summary  Outcome: No Change  1. pna ruled out with CT : chest CT completed, waiting for results  2. Pain controlled- allowing him to eat and drink : NPO, continues to have abdominal pain

## 2017-05-03 NOTE — TELEPHONE ENCOUNTER
The patient was admitted to hospital on 5/2. Will await notification of discharge to call the daughter back.

## 2017-05-03 NOTE — PROGRESS NOTES
[facility]  Patient is currently open to home care services with [facility].  The patient is currently receiving  RN services.  [facility]  and team have been notified of patient admission.  [facility] liaison will continue to follow patient during stay.  If appropriate provide orders to resume home care at time of discharge.    Melissa Sifuentes RN, BSN  [facility]ia[facility]  [phone]

## 2017-05-03 NOTE — PLAN OF CARE
"Problem: Goal Outcome Summary  Goal: Goal Outcome Summary  Outcome: No Change  /86 (BP Location: Right arm)  Pulse 64  Temp 97.4  F (36.3  C) (Oral)  Resp 16  Ht 1.727 m (5' 8\")  Wt 81.6 kg (180 lb)  SpO2 100%  BMI 27.37 kg/m2     VSS, afebrile, Paced. Currently on 1L NC. Barb[place] speaking, family at bedside. LVAD present no alarms. LVAD dressing change complete C/D/I. NS running at 125ml/hr. No BM, adequate urine output. Cotninue to monitor and notify care team of any changes.       "

## 2017-05-03 NOTE — PROGRESS NOTES
Indication of Interrogation:  Admission    Type of VAD:  Heartmate 2    Current Parameters:  Flow= 4.0 lpm, Speed= 8800 rpm, Power= 5.4 capps, PI (if applicable)= 5.3    Abnormal Alarm on History:  No    Abnormal Events/Parameters Notes:  Yes, explain: frequent PI events, PIs WNL (normal for this Pt)    Changes Made during Interrogation:  No

## 2017-05-03 NOTE — H&P
[facility]  Internal Medicine History and Physical    Name: Sohan Rendon MRN#: [id number]   Age: 67 year old YOB: 1950       Assessment and Plan     67 year old man with chronic systolic heart failure 2/2 to ischemic CM s/p HM II LVAD placement in [place] now Destination Therapy due to multiple CVA's with residual left sided weakness c/b recurrent hemolysis and pump thrombus, CKD, who was recently admitted on 2/11-2/15 for appendicitis, managed conservatively; now presents with right sided abdominal pain.     # RUQ Abdominal pain: Likely 2/2 cholecystitis (RUQ US showed Gallbladder sludge with nonspecific mild  Wall thickening) vs gastritis. Hepatitis unlikely given normal LFTs, Lipase and amylase are pending and CT abdomen on 4/29 did not show evidence of fat stranding around pancreas (however study was w/o contrast). C. Diff is also a possible given history of C. Diff in past and recent diarrhea. Driveline site looks normal, so suspicion for driveline infection is low.   Pulmonary etiology is also a possibility (new productive cough), chest Xray was not a good study.   recurrence of appendicitis is another rare possibility, however one would expect pain to localize to RLQ by now and he denies any pain at RLQ. Additionally, CT abdomen on 4/27 was unremarkable.  - studies pending: sputum gram stain and Cx, CT chest, c. Diff and stool enteric pathogen   - Gen surgery consulted for cholecystitis  - consider CT abdomen w/ contrast pending surgery recs  - pantoprazole 40mg daily, consider increasing to bid  - zofran prn for nausea  - Holding off on antibiotics given hemodynamic stability, until the above tests result    ##Chronic  # Chronic systolic heart failure secondary to ischemic CM/ Stage D, NYHA Class III B/ S/p HM II LVAD placement in [place] now DT due to multiple CVAs with residual left sided weakness complicated by recurrent hemolysis and pump thrombus. Focus of  care remains on quality of life.     --LVAD settings: PI 6.1, Speed 8800 rpm, Power 5.4 capps  --BB: Metoprolol 50mg am, 25mg qhs   -- ACEI/ ARB: Losartan 25mg daily   --SCD prophylaxis: ICD  --Fluid status: euvolemic.   --INR goal: 1.8-2.3 (reduced in setting of gross hematuria)  --Antiplatelet agents: none  --Atorvastin 10mg PO daily  -- on bedside interrogation, there were no recent LVAD alarms and driveline site looked normal w/o discharge  -- LDH and plasma free Hgb pending       # HTN: currently controlled, no issues with hypotension.   - cont PTA losartan and metoprolol      # Multiple CVAs with residual and recurrent hemolysis/thrombus: Current baseline with left hemiparesis. No significant rehab potential. Continue coumadin and ASA as above.        # History of seizure: Continue Levetiracetam 750mg PO BID       # Gout: Allopurinol 100mg PO daily       # Constipation: Bowel regimen prn       # BPH: flomax .4mg daily       # Iron Deficiency Anemia: Holding iron and multivitamins while inpatient       ##Other  - PPx: already anticoagulated for DVT/pantoprazole for GI/senna-docusate and bisacodyl for bowel  - FEN: NPO for now  - Code status: Full    Disposition: Admitted to Medicine for evaluation of abdominal pain    Bridgett Arriaza  PGY-3  Internal Medicine  [phone]    Patient will be staffed in morning with attending physician.           Chief Complaint   Abdominal pain  This history was obtained from the patient's daughter, who is a good historian, and a review of the medical record.       History of Present Illness    67 year old man with chronic systolic heart failure 2/2 to ischemic CM s/p HM II LVAD placement in [place] now Destination Therapy due to multiple CVA's with residual left sided weakness c/b recurrent hemolysis and pump thrombus, CKD, who was recently admitted on 2/11-2/15 for appendicitis, managed conservatively; now presents with right sided abdominal pain. The pain started on Friday  afternoon after he ate outside at a restaurant. The pain has been pretty much persistent since. The daughter describes the pain as sharp, localized to right upper and middle abdomen with associated nausea, with 2-3 episodes of vomiting since. The pain is worse with deep inspiration, simethicone helps with the pain somewhat. She denies any fever, chills or sweats but he did have markedly reduced appetite. He was seen in ER on 4/29 and was discharged after CT abdomen was unremarrkable. The pain and nausea got much worse today so he presented to the ED. He also had three loose bowel movements since Friday, with no blood in them. Family denies any black tarry stools. He is bed bound, did not complain of any shortness of breath at rest. He does have mildly productive cough for last 1-2 days. No runny nose or upper respiratory symptoms. No known sick contacts.         Review of Systems   All 12 systems reviewed.  Relevant positives/negatives noted in HPI above      Past Medical History   (Reviewed and updated)  Past Medical History:   Diagnosis Date     Acute right MCA stroke (H) 6/2013    With L sided hemiparesis      Anemia of chronic disease     Baseline Hb 8-9     BPH (benign prostatic hyperplasia)      CHF (congestive heart failure), NYHA class IV (H) 10/9/2012    s/p HeartMate II.  Was on milrinone and dobutamine prior to LVAD      CKD (chronic kidney disease)     Baseline Cr=     Clostridium difficile colitis 12/2012      Dysphagia     PEG tube placed in 2012.  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  Normal carotid duplex 3/2014.       Esophagitis 12/2012    EGD with esophagitis and multiple douenal ulcers     Fracture of femoral neck, right, closed 2/3/2015    Presumed pathologic as patient is non-weight-bearing and suffered no trauma.  Family declined operative repair during hospital stay 1/23 - 1/30/15.      Gastric and duodenal angiodysplasia with hemorrhage 6/18/2015     GERD (gastroesophageal reflux disease)      Gout      HTN (hypertension)     LDL=59 (3/29/2014)     Hyperlipaemia      Ischemic cardiomyopathy      Mitral regurgitation     s/p MVR with Carbomedics ring      Myocardial infarction (H) 1998    In [facility] without intervention      Nonsenile cataract     BE     PFO (patent foramen ovale)     s/p closure (10/9/2012)     TB lung, latent     s/p 9 months INH in 2012           Past Surgical History   (Reviewed and updated)  Past Surgical History:   Procedure Laterality Date     C CABG, VEIN, FIVE  2001     CATARACT IOL, RT/LT Right 11/19/2015     ESOPHAGOSCOPY, GASTROSCOPY, DUODENOSCOPY (EGD), COMBINED N/A 6/10/2015    Procedure: COMBINED ESOPHAGOSCOPY, GASTROSCOPY, DUODENOSCOPY (EGD);  Surgeon: Edu Jenkins MD;  Location: [facility] GI     ESOPHAGOSCOPY, GASTROSCOPY, DUODENOSCOPY (EGD), COMBINED N/A 6/15/2015    Procedure: COMBINED ESOPHAGOSCOPY, GASTROSCOPY, DUODENOSCOPY (EGD);  Surgeon: Yury Bonner MD;  Location: [facility] OR     H INSERT ICD  2/10/2011    And pacemaker.  Not BiV     INSERT VENTRICULAR ASSIST DEVICE LEFT (HEARTMATE II)  10/9/2012     IR GASTRO JEJUNOSTOMY TUBE PLACEMENT       PHACOEMULSIFICATION CLEAR CORNEA WITH STANDARD INTRAOCULAR LENS IMPLANT Right 11/19/2015    Procedure: PHACOEMULSIFICATION CLEAR CORNEA WITH STANDARD INTRAOCULAR LENS IMPLANT;  Surgeon: Violeta Cosme MD;  Location: [facility] OR     REPAIR PATENT FORAMEN OVALE  10/9/2012     REPAIR VALVE MITRAL  2/9/2012    28 mm Carbomedics 2/3 ring          Allergies   (Reviewed and updated)   Allergies   Allergen Reactions     Seasonal Allergies          Medications   (Reviewed and updated)    Current Facility-Administered Medications on File Prior to Encounter:  [COMPLETED] ondansetron (ZOFRAN) injection 4 mg   [COMPLETED] HYDROmorphone (PF) (DILAUDID) injection 0.5 mg   oxyCODONE (ROXICODONE) 5 MG immediate release tablet     Current  Outpatient Prescriptions on File Prior to Encounter:  ondansetron (ZOFRAN ODT) 4 MG ODT tab Take 1 tablet (4 mg) by mouth every 8 hours as needed for nausea   atorvastatin (LIPITOR) 10 MG tablet TAKE 1 TABLET (10 MG) BY MOUTH DAILY   warfarin (COUMADIN) 3 MG tablet Take 3mg by mouth on Mondays and Thursdays. Take 4.5mg by mouth all other days of the week.   calcium carbonate-vitamin D 500-400 MG-UNIT TABS per tablet Take 1 tablet by mouth daily   simethicone (MYLICON) 80 MG chewable tablet Take 1 tablet (80 mg) by mouth 4 times daily   oxyCODONE (ROXICODONE) 5 MG IR tablet Take 1 tablet (5 mg) by mouth every 4 hours as needed for moderate to severe pain   melatonin 3 MG tablet Take 1 tablet (3 mg) by mouth At Bedtime   Thiamine HCl (VITAMIN B-1) 100 MG TABS TAKE 1 TABLET (100 MG) BY MOUTH DAILY   order for DME Equipment being ordered: Cushioned heel boots.   guaiFENesin-dextromethorphan (ROBITUSSIN DM) 100-10 MG/5ML syrup Take 5 mLs by mouth every 4 hours as needed for cough   losartan (COZAAR) 25 MG tablet Take 1 tablet (25 mg) by mouth daily   allopurinol (ZYLOPRIM) 100 MG tablet Take 1 tablet (100 mg) by mouth daily   order for DME Equipment being ordered: Wheelchair, manual, with elevated leg rests and tilt in space back.  Please fax to [facility]; I called them 11/26/16 and they requested the new order.  Face to face is in my 10/26/16 note.   pantoprazole (PROTONIX) 40 MG enteric coated tablet Take 1 tablet (40 mg) by mouth daily   tamsulosin (FLOMAX) 0.4 MG 24 hr capsule Take 1 capsule (0.4 mg) by mouth daily   senna-docusate (SENOKOT-S;PERICOLACE) 8.6-50 MG per tablet Take 1-2 tablets by mouth 2 times daily as needed for constipation   order for DME Equipment being ordered: Wheelchair, manual.   order for DME Equipment being ordered: Hospital Bed, fully electric.   azelastine (OPTIVAR) 0.05 % SOLN Apply 1 drop to eye 2 times daily (Patient taking differently: Apply 1 drop to eye as needed )   loratadine  (CLARITIN) 10 MG tablet Take 1 tablet (10 mg) by mouth daily   levETIRAcetam (KEPPRA) 750 MG tablet Take 1 tablet (750 mg) by mouth 2 times daily   ferrous sulfate (IRON) 325 (65 FE) MG tablet Take 1 tablet (325 mg) by mouth daily (with breakfast)   ascorbic acid (VITAMIN C) 500 MG tablet Take 1 tablet (500 mg) by mouth daily With iron pill   metoprolol (TOPROL-XL) 25 MG 24 hr tablet Take 1 tablet (25 mg) by mouth every evening   metoprolol (TOPROL-XL) 50 MG 24 hr tablet Take 1 tablet (50 mg) by mouth daily   blood glucose monitoring (NO BRAND SPECIFIED) test strip Use to test blood sugar 2 times daily or as directed.   order for DME Equipment being ordered: Reclining manual w/c with bilateral elevating leg rests.   nystatin (MYCOSTATIN) 613749 UNIT/GM POWD Apply to affected areas of skin in the groin and on the scrotum three times a day as needed.   carboxymethylcellulose (REFRESH PLUS) 0.5 % SOLN Place 1 drop into the right eye 3 times daily as needed for other (eye irritation or discomfort.)   order for DME Equipment being ordered: Bilateral leg supports for manual wheelchair.   olopatadine (PATANOL) 0.1 % ophthalmic solution Place 1 drop into both eyes 2 times daily   nitroglycerin (NITROSTAT) 0.4 MG SL tablet Place 1 tablet (0.4 mg) under the tongue every 5 minutes as needed for chest pain   blood glucose monitoring (NO BRAND SPECIFIED) lancets Use to test blood sugars 2 times daily or as directed.   ORDER FOR DME Equipment being ordered: Lift chair.Please see indications and face-to-face encounter details in 2/3/15 Office Visit note.   acetaminophen (TYLENOL) 325 MG tablet Take 2 tablets (650 mg) by mouth every 6 hours (Patient taking differently: Take 650 mg by mouth as needed )   bisacodyl (DULCOLAX) 5 MG EC tablet Take 1 tablet (5 mg) by mouth daily as needed for constipation         Family History   (Reviewed and updated)  Family History   Problem Relation Age of Onset     Family History Negative No family  "hx of      Glaucoma No family hx of      Macular Degeneration No family hx of      CANCER No family hx of      no skin cancer         Social History   (Reviewed and updated)  Social History   Substance Use Topics     Smoking status: Former Smoker     Smokeless tobacco: Former User     Alcohol use No         Physical Exam   Vitals: Blood pressure 118/86, pulse 64, temperature 97.4  F (36.3  C), temperature source Oral, resp. rate 16, height 1.727 m (5' 8\"), weight 81.6 kg (180 lb), SpO2 98 %.    Gen: laying in bed comfortably, NAD  HEENT: no scleral icterus, no conjunctival pallor  CV: LVAD hum +  Resp: breathing comfortably on RA, good b/l air entry, CTAB anteriorly  Abd: soft, non distended, tender in right upper and middle quadrant with also mild tenderness noted in epigastrium, no peritoneal signs, NABS  Driveline site looks clean, dry. No discharge noted  Ext: warm, trace edema on left      Data   Labs:  All labs reviewed.      Microbiology:  Blood cx, UA, sputum smear and Cx pending    Imaging  All images reviewed.  Relevant imaging noted below    RUQ US (5/2/2017):  1. Gallbladder sludge with nonspecific mild gallbladder wall  thickening. No cholelithiasis or findings to suggest cholecystitis.  2. Atrophic right kidney with cortical scarring.      CT abdomen/pelvis 4/29;  IMPRESSION:   1. No acute intra-abdominal pathology identified. No bowel obstruction.  2. Nonobstructing right renal stones appear similar to previous.                       "

## 2017-05-03 NOTE — ED NOTES
Pt complains of abdominal pain and bloating.  He was seen in the ED last night for vomiting and diarrhea.  He hasn't had any of that today, just the right sided abd pain and bloating.  He is a VAD pt.

## 2017-05-03 NOTE — TELEPHONE ENCOUNTER
Patient was in the hospital on 5/2/2017 for vomiting ad diarrhea. Patient is scheudled to see Dr. Thomas Dill on 6/13/2017. Will route to nurse to follow up with patient.

## 2017-05-03 NOTE — CONSULTS
GENERAL SURGERY CONSULT NOTE  5/3/2017    PRIMARY TEAM: Medicine    REASON FOR CONSULT: Right sided abdominal pain   LEVEL OF CONSULT: Consult, follow and place orders      ASSESSMENT: Sohan Rendon is a 67 year old male with PMH significant for ICM with LVAD in place, multiple CVAs with residual left sided weakness, CKD, recurrent hemolysis and pump thrombus, admission in 2/2017 for appendicitis (managed with antibiotics) who presents with persistent sharp RUQ pain and emesis for several days.     RECOMMENDATIONS:   -pain does not seem consistent with cholecystitis (continuous epigastric pain), no evidence of cystic duct obstruction on US (sludge, no cholelithiasis, pericholecystic fluid, negative omer's) or labs (normal LFTs)  -would recommend continued work up by primary team, gastritis etc  -would be a poor surgical candidate given his significant co-morbidities  -surgery will sign off at this time, please call if additional questions or concerns    Patient seen, findings and plan discussed with staff Dr. Waller.    Kasie Pacheco MD  PGY-2 General Surgery  [phone]    HISTORY PRESENTING ILLNESS: This is a 67 year old male with PMH significant for ICM with LVAD in place, multiple CVAs with residual left sided weakness, CKD, recurrent hemolysis and pump thrombus, admission in 2/2017 for appendicitis (managed with antibiotics) who presents with persistent sharp RUQ pain and emesis ( one non bloody bout) for several days.     No fevers, chills, CP, SOB, constipation or melena. Two loose BMs in the last several days    History from chart review and conversation with the patient's daughter at bedside    Review of systems: 10 point ROS neg other than the symptoms noted above in the HPI.     PAST MEDICAL HISTORY:  Past Medical History:   Diagnosis Date     Acute right MCA stroke (H) 6/2013    With L sided hemiparesis      Anemia of chronic disease     Baseline Hb 8-9     BPH (benign prostatic hyperplasia)       CHF (congestive heart failure), NYHA class IV (H) 10/9/2012    s/p HeartMate II.  Was on milrinone and dobutamine prior to LVAD      CKD (chronic kidney disease)     Baseline Cr=     Clostridium difficile colitis 12/2012      Dysphagia     PEG tube placed in 2012.  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  Normal carotid duplex 3/2014.       Esophagitis 12/2012    EGD with esophagitis and multiple douenal ulcers     Fracture of femoral neck, right, closed 2/3/2015    Presumed pathologic as patient is non-weight-bearing and suffered no trauma.  Family declined operative repair during hospital stay 1/23 - 1/30/15.     Gastric and duodenal angiodysplasia with hemorrhage 6/18/2015     GERD (gastroesophageal reflux disease)      Gout      HTN (hypertension)     LDL=59 (3/29/2014)     Hyperlipaemia      Ischemic cardiomyopathy      Mitral regurgitation     s/p MVR with Carbomedics ring      Myocardial infarction (H) 1998    In [facility] without intervention      Nonsenile cataract     BE     PFO (patent foramen ovale)     s/p closure (10/9/2012)     TB lung, latent     s/p 9 months INH in 2012        PAST SURGICAL HISTORY:  Past Surgical History:   Procedure Laterality Date     C CABG, VEIN, FIVE  2001     CATARACT IOL, RT/LT Right 11/19/2015     ESOPHAGOSCOPY, GASTROSCOPY, DUODENOSCOPY (EGD), COMBINED N/A 6/10/2015    Procedure: COMBINED ESOPHAGOSCOPY, GASTROSCOPY, DUODENOSCOPY (EGD);  Surgeon: Edu Jenkins MD;  Location: [facility]     ESOPHAGOSCOPY, GASTROSCOPY, DUODENOSCOPY (EGD), COMBINED N/A 6/15/2015    Procedure: COMBINED ESOPHAGOSCOPY, GASTROSCOPY, DUODENOSCOPY (EGD);  Surgeon: Yury Bonner MD;  Location: [facility] OR     H INSERT ICD  2/10/2011    And pacemaker.  Not BiV     INSERT VENTRICULAR ASSIST DEVICE LEFT (HEARTMATE II)  10/9/2012     IR GASTRO JEJUNOSTOMY TUBE PLACEMENT       PHACOEMULSIFICATION CLEAR  CORNEA WITH STANDARD INTRAOCULAR LENS IMPLANT Right 11/19/2015    Procedure: PHACOEMULSIFICATION CLEAR CORNEA WITH STANDARD INTRAOCULAR LENS IMPLANT;  Surgeon: Violeta Cosme MD;  Location: [facility] OR     REPAIR PATENT FORAMEN OVALE  10/9/2012     REPAIR VALVE MITRAL  2/9/2012    28 mm Carbomedics 2/3 ring        FAMILY HISTORY:  No significant    Social history:   No smoking or ETOH    ALLERGIES:  Allergies   Allergen Reactions     Seasonal Allergies        MEDICATIONS:    Current Facility-Administered Medications on File Prior to Encounter:  oxyCODONE (ROXICODONE) 5 MG immediate release tablet     Current Outpatient Prescriptions on File Prior to Encounter:  ondansetron (ZOFRAN ODT) 4 MG ODT tab Take 1 tablet (4 mg) by mouth every 8 hours as needed for nausea   atorvastatin (LIPITOR) 10 MG tablet TAKE 1 TABLET (10 MG) BY MOUTH DAILY   warfarin (COUMADIN) 3 MG tablet Take 3mg by mouth on Mondays and Thursdays. Take 4.5mg by mouth all other days of the week.   calcium carbonate-vitamin D 500-400 MG-UNIT TABS per tablet Take 1 tablet by mouth daily   simethicone (MYLICON) 80 MG chewable tablet Take 1 tablet (80 mg) by mouth 4 times daily   oxyCODONE (ROXICODONE) 5 MG IR tablet Take 1 tablet (5 mg) by mouth every 4 hours as needed for moderate to severe pain   melatonin 3 MG tablet Take 1 tablet (3 mg) by mouth At Bedtime   Thiamine HCl (VITAMIN B-1) 100 MG TABS TAKE 1 TABLET (100 MG) BY MOUTH DAILY   order for DME Equipment being ordered: Cushioned heel boots.   guaiFENesin-dextromethorphan (ROBITUSSIN DM) 100-10 MG/5ML syrup Take 5 mLs by mouth every 4 hours as needed for cough   losartan (COZAAR) 25 MG tablet Take 1 tablet (25 mg) by mouth daily   allopurinol (ZYLOPRIM) 100 MG tablet Take 1 tablet (100 mg) by mouth daily   order for DME Equipment being ordered: Wheelchair, manual, with elevated leg rests and tilt in space back.  Please fax to [facility] [place]; I called them 11/26/16 and they requested the new order.  Face to  face is in my 10/26/16 note.   pantoprazole (PROTONIX) 40 MG enteric coated tablet Take 1 tablet (40 mg) by mouth daily   tamsulosin (FLOMAX) 0.4 MG 24 hr capsule Take 1 capsule (0.4 mg) by mouth daily   senna-docusate (SENOKOT-S;PERICOLACE) 8.6-50 MG per tablet Take 1-2 tablets by mouth 2 times daily as needed for constipation   order for DME Equipment being ordered: Wheelchair, manual.   order for DME Equipment being ordered: Hospital Bed, fully electric.   azelastine (OPTIVAR) 0.05 % SOLN Apply 1 drop to eye 2 times daily (Patient taking differently: Apply 1 drop to eye as needed )   loratadine (CLARITIN) 10 MG tablet Take 1 tablet (10 mg) by mouth daily   levETIRAcetam (KEPPRA) 750 MG tablet Take 1 tablet (750 mg) by mouth 2 times daily   ferrous sulfate (IRON) 325 (65 FE) MG tablet Take 1 tablet (325 mg) by mouth daily (with breakfast)   ascorbic acid (VITAMIN C) 500 MG tablet Take 1 tablet (500 mg) by mouth daily With iron pill   metoprolol (TOPROL-XL) 25 MG 24 hr tablet Take 1 tablet (25 mg) by mouth every evening   metoprolol (TOPROL-XL) 50 MG 24 hr tablet Take 1 tablet (50 mg) by mouth daily   blood glucose monitoring (NO BRAND SPECIFIED) test strip Use to test blood sugar 2 times daily or as directed.   order for DME Equipment being ordered: Reclining manual w/c with bilateral elevating leg rests.   nystatin (MYCOSTATIN) 718499 UNIT/GM POWD Apply to affected areas of skin in the groin and on the scrotum three times a day as needed.   carboxymethylcellulose (REFRESH PLUS) 0.5 % SOLN Place 1 drop into the right eye 3 times daily as needed for other (eye irritation or discomfort.)   order for DME Equipment being ordered: Bilateral leg supports for manual wheelchair.   olopatadine (PATANOL) 0.1 % ophthalmic solution Place 1 drop into both eyes 2 times daily   nitroglycerin (NITROSTAT) 0.4 MG SL tablet Place 1 tablet (0.4 mg) under the tongue every 5 minutes as needed for chest pain   blood glucose monitoring  (NO BRAND SPECIFIED) lancets Use to test blood sugars 2 times daily or as directed.   ORDER FOR DME Equipment being ordered: Lift chair.Please see indications and face-to-face encounter details in 2/3/15 Office Visit note.   acetaminophen (TYLENOL) 325 MG tablet Take 2 tablets (650 mg) by mouth every 6 hours (Patient taking differently: Take 650 mg by mouth as needed )   bisacodyl (DULCOLAX) 5 MG EC tablet Take 1 tablet (5 mg) by mouth daily as needed for constipation       PHYSICAL EXAMINATION:  Temp:  [97.4  F (36.3  C)-97.7  F (36.5  C)] 97.6  F (36.4  C)  Pulse:  [64-81] 64  Heart Rate:  [60-66] 60  Resp:  [16-20] 18  BP: ()/(82-92) 121/92  SpO2:  [94 %-100 %] 100 %  General: no acute distress  CV: RRR  Pulm: Non labored breathing on NA  Abd: soft, tender in epigastric region, negative murphys, minimally distended, tympanetic, dressing over LVAD site is c/d/i, no rebound or guadring  Ex: wwp, no pedal edema, 2+ peripheral pulses b/l    LABS:  LABS: Reviewed.   Arterial Blood Gases   No lab results found in last 7 days.  Complete Blood Count     Recent Labs  Lab 05/03/17 0545 05/02/17 2247 05/02/17 0442 04/29/17  0737   WBC 16.3* 15.7* 9.1 7.3   HGB 13.2* 13.7 13.7 14.2    179 160 157     Basic Metabolic Panel    Recent Labs  Lab 05/03/17 0545 05/02/17 2247 05/02/17 0442 04/29/17  0737    134 138 141   POTASSIUM 4.4 3.8 3.8 3.7   CHLORIDE 109 105 106 109   CO2 15* 20 23 22   BUN 14 15 16 17   CR 1.45* 1.57* 1.78* 1.63*   * 208* 188* 157*     Liver Function Tests    Recent Labs  Lab 05/02/17 2247 05/02/17 0442 04/29/17  0737 04/28/17   AST 27 33 38  --    ALT 10 10 15  --    ALKPHOS 105 105 115  --    BILITOTAL 0.9 0.7 0.7  --    ALBUMIN 3.2* 3.3* 3.5  --    INR 2.93* 2.51* 2.24* 2.4     Pancreatic Enzymes    Recent Labs  Lab 05/03/17  0350 05/02/17  2247 05/02/17  0442 04/29/17  0737   LIPASE 47* 53* 78 100   AMYLASE 34  --   --   --      Coagulation Profile    Recent Labs  Lab  05/02/17  2247 05/02/17  0442 04/29/17  0737 04/28/17   INR 2.93* 2.51* 2.24* 2.4     Lactate  Invalid input(s): LACTATE    IMAGING:  Recent Results (from the past 24 hour(s))   XR Chest 2 Views    Narrative    EXAM: XR CHEST 2 VW  5/3/2017 12:22 AM      HISTORY: Right lower chest/RUQ pain    COMPARISON: 3/27/2017    FINDINGS: Single AP view of the chest.    Left chest wall automatic implantable cardiac defibrillator with  stable lead position. LVAD in expected position.    Nondisplaced sternotomy wires. Perihilar opacities. Low lung volumes.  No focal pulmonary airspace opacity. Small pleural effusions.      Impression    IMPRESSION:   Perihilar opacities, likely crowding of vessels related to low lung  volumes. Small pleural effusions.    I have personally reviewed the examination and initial interpretation  and I agree with the findings.    JENNIFER RIVERO   US Abdomen Limited    Narrative    EXAMINATION: US ABDOMEN LIMITED, 5/3/2017 12:45 AM     COMPARISON: Abdominal CT 4/29/2017    HISTORY: Right upper quadrant abdominal pain    FINDINGS:   Fluid: No evidence of ascites or pleural effusions.    Liver: The liver demonstrates normal echotexture, measuring 15 cm in  craniocaudal dimension. There is no evidence of a focal mass.There is  no intrahepatic biliary dilatation.    Gallbladder: The gallbladder is well-distended and normal in  morphology. There is mild gallbladder wall thickening, measuring 3.5  mm in thickness. No pericholecystic fluid. Sonographic Guy sign  when performed by the technologist. Layering gallbladder sludge.    Bile Ducts: Both the intra- and extrahepatic biliary system are of  normal caliber.  The common bile duct measures 2.3 mm in diameter.    Pancreas: Pancreas obscured, likely related to LVAD and/or bowel gas.    Kidney: Limited visualization of the kidney. Atrophic right kidney  measuring 8.3 cm, with cortical scarring. No hydronephrosis. Superior  pole renal cyst measuring 3 cm as  seen on prior exams.        Impression    IMPRESSION:   1.  Gallbladder sludge with nonspecific mild gallbladder wall  thickening. No cholelithiasis noted.  Differential diagnosis includes  acalculous cholecystitis or gallbladder wall thickening secondary to  hepatic dysfunction.  2.  Atrophic right kidney with cortical scarring.    Dr. Arriaza paged with no call return this AM as of yet.  However, team  is aware of cholecystitis included in the DDX, as evidenced by Dr. Arriaza's note including right upper quadrant pain note, likely  secondary to cholecystitis versus gastritis.  Therefore no further  attempts made to contact team for finding discussion.     I have personally reviewed the examination and initial interpretation  and I agree with the findings.    ABEL PURVIS MD

## 2017-05-04 PROBLEM — K29.80 DUODENITIS: Status: ACTIVE | Noted: 2017-05-04

## 2017-05-04 LAB
ERYTHROCYTE [DISTWIDTH] IN BLOOD BY AUTOMATED COUNT: 16.7 % (ref 10–15)
HCT VFR BLD AUTO: 36.4 % (ref 40–53)
HGB BLD-MCNC: 12.3 G/DL (ref 13.3–17.7)
INR PPP: 4.19 (ref 0.86–1.14)
MCH RBC QN AUTO: 31.6 PG (ref 26.5–33)
MCHC RBC AUTO-ENTMCNC: 33.8 G/DL (ref 31.5–36.5)
MCV RBC AUTO: 94 FL (ref 78–100)
PLATELET # BLD AUTO: 163 10E9/L (ref 150–450)
RBC # BLD AUTO: 3.89 10E12/L (ref 4.4–5.9)
WBC # BLD AUTO: 14.9 10E9/L (ref 4–11)

## 2017-05-04 PROCEDURE — 25000128 H RX IP 250 OP 636: Performed by: INTERNAL MEDICINE

## 2017-05-04 PROCEDURE — 99233 SBSQ HOSP IP/OBS HIGH 50: CPT | Performed by: INTERNAL MEDICINE

## 2017-05-04 PROCEDURE — 25000125 ZZHC RX 250

## 2017-05-04 PROCEDURE — 85027 COMPLETE CBC AUTOMATED: CPT | Performed by: INTERNAL MEDICINE

## 2017-05-04 PROCEDURE — 36415 COLL VENOUS BLD VENIPUNCTURE: CPT | Performed by: INTERNAL MEDICINE

## 2017-05-04 PROCEDURE — 12000006 ZZH R&B IMCU INTERMEDIATE UMMC

## 2017-05-04 PROCEDURE — 85610 PROTHROMBIN TIME: CPT | Performed by: INTERNAL MEDICINE

## 2017-05-04 PROCEDURE — 25000132 ZZH RX MED GY IP 250 OP 250 PS 637: Performed by: INTERNAL MEDICINE

## 2017-05-04 PROCEDURE — G0378 HOSPITAL OBSERVATION PER HR: HCPCS

## 2017-05-04 RX ADMIN — PANTOPRAZOLE SODIUM 40 MG: 40 TABLET, DELAYED RELEASE ORAL at 06:57

## 2017-05-04 RX ADMIN — SODIUM CHLORIDE 1000 ML: 9 INJECTION, SOLUTION INTRAVENOUS at 11:22

## 2017-05-04 RX ADMIN — MELATONIN TAB 3 MG 3 MG: 3 TAB at 22:10

## 2017-05-04 RX ADMIN — ONDANSETRON 4 MG: 2 INJECTION INTRAMUSCULAR; INTRAVENOUS at 20:55

## 2017-05-04 RX ADMIN — ACETAMINOPHEN 650 MG: 325 TABLET, FILM COATED ORAL at 20:55

## 2017-05-04 RX ADMIN — METOPROLOL SUCCINATE 50 MG: 50 TABLET, EXTENDED RELEASE ORAL at 09:11

## 2017-05-04 RX ADMIN — LORATADINE 10 MG: 10 TABLET ORAL at 09:11

## 2017-05-04 RX ADMIN — LEVETIRACETAM 750 MG: 750 TABLET, FILM COATED ORAL at 09:11

## 2017-05-04 RX ADMIN — SIMETHICONE CHEW TAB 80 MG 80 MG: 80 TABLET ORAL at 09:10

## 2017-05-04 RX ADMIN — LEVETIRACETAM 750 MG: 750 TABLET, FILM COATED ORAL at 20:53

## 2017-05-04 RX ADMIN — ACETAMINOPHEN 650 MG: 325 TABLET, FILM COATED ORAL at 08:52

## 2017-05-04 RX ADMIN — LIDOCAINE HYDROCHLORIDE 10 ML: 20 JELLY TOPICAL at 11:26

## 2017-05-04 RX ADMIN — ACETAMINOPHEN 650 MG: 325 TABLET, FILM COATED ORAL at 02:47

## 2017-05-04 RX ADMIN — PANTOPRAZOLE SODIUM 40 MG: 40 TABLET, DELAYED RELEASE ORAL at 18:09

## 2017-05-04 RX ADMIN — Medication 100 MG: at 09:11

## 2017-05-04 RX ADMIN — TAMSULOSIN HYDROCHLORIDE 0.4 MG: 0.4 CAPSULE ORAL at 09:10

## 2017-05-04 RX ADMIN — ATORVASTATIN CALCIUM 10 MG: 10 TABLET, FILM COATED ORAL at 09:11

## 2017-05-04 RX ADMIN — ALLOPURINOL 100 MG: 100 TABLET ORAL at 09:11

## 2017-05-04 RX ADMIN — METOPROLOL SUCCINATE 25 MG: 25 TABLET, EXTENDED RELEASE ORAL at 20:55

## 2017-05-04 RX ADMIN — SENNOSIDES AND DOCUSATE SODIUM 2 TABLET: 8.6; 5 TABLET ORAL at 18:22

## 2017-05-04 RX ADMIN — ACETAMINOPHEN 650 MG: 325 TABLET, FILM COATED ORAL at 15:10

## 2017-05-04 ASSESSMENT — PAIN DESCRIPTION - DESCRIPTORS
DESCRIPTORS: ACHING
DESCRIPTORS: SHARP;ACHING
DESCRIPTORS: ACHING;DISCOMFORT

## 2017-05-04 NOTE — DISCHARGE INSTRUCTIONS
__________________________________________________________  D/C'ing nurse: Please fax to following agencies at time of patient d/c:    _______________________  [facility]  Phone  [phone]  Fax  [phone]  ______________________      ____________________________________________________________

## 2017-05-04 NOTE — PROGRESS NOTES
Problem: Goal Outcome Summary  Goal: Goal Outcome Summary  Outcome: Improving  1. pna ruled out with CT : chest CT completed, shows duodenitis  2. Pain controlled- allowing him to eat and drink : drank some water, but currently not eating because it is night time. He is comfortable resting.

## 2017-05-04 NOTE — PROGRESS NOTES
CM notified on 5/3 of client's admission to the hospital. CM spoke with CC at the hospital and informed her of the services client has in the home. She will contact CM of any further needs when client discharges back home.   Nena Salazar RN  [facility]   [phone]

## 2017-05-04 NOTE — PROGRESS NOTES
Care Coordinator Progress Note     Admission Date/Time:  5/2/2017  Attending MD:  Selene Frias MD     Data  Chart reviewed, discussed with interdisciplinary team.   Patient was admitted for:    Abdominal pain, epigastric  Abdominal pain, right upper quadrant  Abdominal distention  Diarrhea, unspecified type.    Concerns with insurance coverage for discharge needs: None.  Current Living Situation: Patient lives with family.  Support System: Supportive and Involved  Services Involved: Home Care and PCA  Transportation: Family or Friend will provide  Barriers to Discharge: No barriers identified at this time    Assessment  Pt is a 67 year old male with PMH significant for chronic systolic heart failure 2/2 ischemic CM s/p HM II LVAD placement now Destination Therapy due to multiple CVA's with residual left sided weakness c/b recurrent hemolysis and pump thrombus, and CKD who was recently admitted for appendicitis and now presented with right sided abdominal pain. Writer received a call from Nena Salazar (phone: [phone]), [facility] , notifying writer of pt's current home services. Per Nena, pt is open to [facility] for skilled nursing services and has 11 hours of PCA services through [facility] and an additional 4 hours/day through a CADI waiver. Met with pt and daughter, Almaz, at bedside to introduce RNCC role and discuss discharge planning via . Pt confirmed that he has home care and PCA hours and stated that he would like these services resumed on discharge. Asked pt if he knew contact information for his CADI  and he did not. Resumption orders for home care placed. Pt is dependent for mobility and, per pt and daughter, they have a hospital bed, wheelchair, and colleen lift at home. Almaz stated that the family also performs ROM on pt daily. Pt will need stretcher transport arranged at time of discharge, per Almaz.  Will continue to follow plan of care.       Plan  Anticipated Discharge Date:  1-2 days  Anticipated Discharge Plan:  Pt will discharge to home with resumption of services.     Meg Doll RN

## 2017-05-04 NOTE — PROGRESS NOTES
05/04/17 1430   Visit Information   Visit Made By Staff    Type of Visit Initial   Visited Patient;Family   Interventions   Basic Spiritual Interventions    introduction/orientation to Spiritual Health Services;Assessment of spiritual needs/resources;Reflective conversation;Life Review;Prayer;Devotional reading   Provision of Spiritual Resources  Rastafari article(s)/object(s) of devotion   Advanced Assessments/Interventions   Presenting Concerns/Issues Spiritual/Mormon/emotional support   SPIRITUAL HEALTH SERVICES Progress Note  [facility] [place] U6B         DATA:      Visited with pt/family on the basis of  requested for the pt/family.  Reflected with pt/family around their hospital experience, sources of spiritual and emotional support and current spiritual health needs. Pt s daughter talked about her dad current situation and what I mean for her and her family. . During our conversation with pt. s daughter, she reported that, she worries about the health of her dad. I let him know that I could be of support to the pt and his family.  I would be able to coordinate and participate as a spiritual support for both him and his family. Pt receives support from his children  and his extended family. Pt/family reported that their tanya is important to them and hope for the future and trust in God.       INTERVENTION:    Emotional support. Reflective conversation integrating illness elements and family spiritual narratives.  I shared reading that would invite God into the room and to bless those present, support them in their suffering.  I provided a special prayer asking of God to help the pt and to ease and eliminate any suffering and pain that the pt and his family feel. I gave Islamic Prayer Booklet.        OUTCOME:    Pt/family received spiritual support and reflective conversation in the context of this hospitalization. The pt/family expressed appreciation for the visit and  the encouragement that they felt that they have God s support in their struggles. They requested ongoing Confucianism  support.        PLAN:    Will continue to provide support to pt/family during their hospitalization at least 1x/wk.                                                                                                                                             Germán Kunz  Staff   Pager [phone]

## 2017-05-04 NOTE — PLAN OF CARE
Problem: Goal Outcome Summary  Goal: Goal Outcome Summary  Outcome: Improving  1. pna ruled out with CT : chest CT completed; shows duodenitis  2. Pain controlled - using tylenol prn; ate 25% breakfast; encouraging fluids/food

## 2017-05-04 NOTE — PLAN OF CARE
Problem: Goal Outcome Summary  Goal: Goal Outcome Summary  Outcome: No Change  1. pna ruled out with CT : chest CT completed; shows duodenitis  2. Pain controlled - using tylenol prn. IV Bolus of NS given; encouraging foods/fluids

## 2017-05-04 NOTE — PROGRESS NOTES
[facility], [place]  Internal Medicine Progress Note - Gold Service    Assessment & Plan   Sohan Rendon is a 67 year old male with chronic systolic heart failure 2/2 to ischemic CM s/p HM II LVAD placement in [place] now Destination Therapy due to multiple CVA's with residual left sided weakness c/b recurrent hemolysis and pump thrombus, CKD, who was recently admitted on 2/11-2/15 for appendicitis, managed conservatively; now presents with right sided abdominal pain.       1- Duodenitis causing RUQ abd pain: RUQ US showed gallbladder sludge with nonspecific mild-wall thickening, but CT showed no evidence of cholecystitis and his exam was not consistent with cholecystitis. No concern for cholangitis at this time with normal LFTs. Hepatitis unlikely given normal LFTs. Pancreatitis unlikely given normal lipase and amylase. CT abdomen on 4/29 did not show evidence of fat stranding around pancreas (however study was w/o contrast). C. Diff is possible given history of diarrhea, but unlikely without BM in 24 hours. Driveline site looks normal, so suspicion for driveline infection is low. Pulmonary etiology is also a possibility with new productive cough and elevated WBC, but CXR without obvious consolidation. PE unlikely with INR 2.93. Recurrence of appendicitis is another possibility, however improved CT on admission. Given lack of EtOH consumption, tobacco and NSAID use. Suspicious for H.pylori infection causing gastritis with duodenitis seen on CT imaging from 5/3.  Gen surgery was consulted for cholecystitis; think it is unlikely.  - Check for H. Pylori if he has a stool (as gastritis could be contributing to duodenitis)  - Increased PPI to 40mg BID- only for 2 wks  - No more famotidine given PPI use  - Gi cocktail once (helpful)  - No NSAIDs  - studies pending BM: sputum Cx, c. Diff and stool enteric pathogen   - zofran prn for nausea  - cards 2 consult given LVAD status  - tylenol prior  to meals (oxycodone prn if near bedtime)    2. Urinary retention: Hx of BPH, had bladder scan overnight for 600 mL, and he urinated only 300mL  - post-void bladder scan with 800mL--> straight cath once and if retaining again may place a truong    3. Supratherapeutic INR: INR goal for LVAD 1.8-2.5.  INR on admission 2.93 (5/2 at 11 pm).  INR 4.19 on 5/4/2017 likely since he is not eating much. He received a lowered dose of warfarin on 5/3  - no warfarin on 5/4 and 5/5 per pharmacy, and then restart Saturday at 1.5 mg with INR recheck on Monday    Chronic Conditions  Chronic systolic heart failure secondary to ischemic CM/ Stage D, NYHA Class III B/ S/p HM II LVAD placement in [place] now DT due to multiple CVAs with residual left sided weakness complicated by recurrent hemolysis and pump thrombus. Focus of care remains on quality of life.    --LVAD settings: PI 6.1, Speed 8800 rpm, Power 5.4 capps  --BB: Metoprolol 50mg am, 25mg qhs   --ACEI/ ARB: Losartan 25mg daily   --SCD prophylaxis: ICD  --Fluid status: euvolemic.   --INR goal: 1.8-2.3 (reduced in setting of gross hematuria)  --Antiplatelet agents: none  --Atorvastin 10mg PO daily  --on bedside interrogation, there were no recent LVAD alarms and driveline site looked normal w/o discharge  --      HTN: currently controlled, no issues with hypotension.   - cont PTA losartan and metoprolol      Multiple CVAs with residual and recurrent hemolysis/thrombus: Current baseline with left hemiparesis. No significant rehab potential. Continue coumadin and ASA as above.        History of seizure: Continue Levetiracetam 750mg PO BID       Gout: Allopurinol 100mg PO daily       Constipation: Bowel regimen prn       BPH: flomax .4mg daily       Iron Deficiency Anemia: Holding iron and multivitamins while inpatient     Diet: Regular Diet Adult, encourage drinking fluids  Fluids: 1 L NS bolus for dehydration  DVT Prophylaxis: Warfarin- supratherapeutic  Code Status: Full  Code    Disposition Plan   Expected discharge:1-2 days recommended to home after his pain does not stop him from eating and drinking      Entered: Selene Frias 05/04/2017, 9:35 AM    The patient's care was discussed with his daughter and bedside nurse    Selene Frias  Internal Medicine Staff Hospitalist Service  [facility]  Pager: [phone]  Please see sticky note for cross cover information    Interval History   Justice ate a little yesterday and a little breakfast today. His daughter shared his tray, and his intake was minimal.  He did not drink much yesterday as he was very tired, but today his daughter will encourage him to drink more. He had much less pain after eating with tylenol. Oxycodone makes him very sleepy and thus isn't as helpful for pain control. He continues to have pleuritic pain with deep breaths. He has had some urinary retention, and he is fine with a straight cath or truong prn.  Otherwise, 4 pt ROS neg    Data reviewed today: I reviewed all medications, new labs and imaging results over the last 24 hours.     Physical Exam   Vital Signs: Temp: 97.7  F (36.5  C) Temp src: Oral BP: 93/72 Pulse: 67 Heart Rate: 68 Resp: 16 SpO2: 97 % O2 Device: None (Room air)    Weight: 180 lbs 0 oz  General: Lying in bed with daughter by side. No acute distress   HEENT: arcus senilis risa; PERRL. EOM intact. Sclera non-icteric. MMM  Resp: Lungs clear to auscultation bilaterally.   CV: Hum of LVAD audible, cap refill < 3 sec  Abdominal: Tender to palpation on right of abd; BS+; mild distention  MSK: No gross abnormalities. Appropriate muscle mass  Skin: No bruising nor rashes in limited exam. No jaundice  Extremities: Trace LE edema  Neurological: CN's II-XII grossly intact. A&O     Data    ROUTINE IP LABS (Last four results)  BMP  Recent Labs  Lab 05/03/17  0545 05/02/17  2247 05/02/17  0442 04/29/17  0737    134 138 141   POTASSIUM 4.4 3.8 3.8 3.7   CHLORIDE 109 105 106 109    JOSÉ 7.9* 8.5 8.6 8.6   CO2 15* 20 23 22   BUN 14 15 16 17   CR 1.45* 1.57* 1.78* 1.63*   * 208* 188* 157*     CBC  Recent Labs  Lab 05/04/17  0639 05/03/17  0545 05/02/17 2247 05/02/17  0442   WBC 14.9* 16.3* 15.7* 9.1   RBC 3.89* 4.07* 4.34* 4.27*   HGB 12.3* 13.2* 13.7 13.7   HCT 36.4* 39.4* 41.3 41.1   MCV 94 97 95 96   MCH 31.6 32.4 31.6 32.1   MCHC 33.8 33.5 33.2 33.3   RDW 16.7* 16.9* 16.5* 16.9*    163 179 160     INR  Recent Labs  Lab 05/04/17  0639 05/02/17 2247 05/02/17  0442 04/29/17  0737   INR 4.19* 2.93* 2.51* 2.24*

## 2017-05-04 NOTE — PROGRESS NOTES
Problem: Goal Outcome Summary  Goal: Goal Outcome Summary  Outcome: Improving  1. pna ruled out with CT : chest CT completed, shows duodenitis  2. Pain controlled- allowing him to eat and drink : pt able to eat, however, not eating because it is night time.

## 2017-05-05 LAB
ANION GAP SERPL CALCULATED.3IONS-SCNC: 9 MMOL/L (ref 3–14)
BACTERIA SPEC CULT: NORMAL
BUN SERPL-MCNC: 16 MG/DL (ref 7–30)
CALCIUM SERPL-MCNC: 8 MG/DL (ref 8.5–10.1)
CHLORIDE SERPL-SCNC: 108 MMOL/L (ref 94–109)
CO2 SERPL-SCNC: 17 MMOL/L (ref 20–32)
CREAT SERPL-MCNC: 1.65 MG/DL (ref 0.66–1.25)
CRP SERPL-MCNC: 190 MG/L (ref 0–8)
ERYTHROCYTE [DISTWIDTH] IN BLOOD BY AUTOMATED COUNT: 16.6 % (ref 10–15)
GFR SERPL CREATININE-BSD FRML MDRD: 42 ML/MIN/1.7M2
GLUCOSE SERPL-MCNC: 239 MG/DL (ref 70–99)
HCT VFR BLD AUTO: 35.4 % (ref 40–53)
HGB BLD-MCNC: 11.8 G/DL (ref 13.3–17.7)
INR PPP: 3.3 (ref 0.86–1.14)
MAGNESIUM SERPL-MCNC: 1.8 MG/DL (ref 1.6–2.3)
MCH RBC QN AUTO: 31.5 PG (ref 26.5–33)
MCHC RBC AUTO-ENTMCNC: 33.3 G/DL (ref 31.5–36.5)
MCV RBC AUTO: 94 FL (ref 78–100)
MICRO REPORT STATUS: NORMAL
PLATELET # BLD AUTO: 177 10E9/L (ref 150–450)
POTASSIUM SERPL-SCNC: 3.5 MMOL/L (ref 3.4–5.3)
RBC # BLD AUTO: 3.75 10E12/L (ref 4.4–5.9)
SODIUM SERPL-SCNC: 134 MMOL/L (ref 133–144)
SPECIMEN SOURCE: NORMAL
WBC # BLD AUTO: 15.8 10E9/L (ref 4–11)

## 2017-05-05 PROCEDURE — 25000125 ZZHC RX 250: Performed by: INTERNAL MEDICINE

## 2017-05-05 PROCEDURE — 12000006 ZZH R&B IMCU INTERMEDIATE UMMC

## 2017-05-05 PROCEDURE — 40000141 ZZH STATISTIC PERIPHERAL IV START W/O US GUIDANCE

## 2017-05-05 PROCEDURE — 85610 PROTHROMBIN TIME: CPT | Performed by: INTERNAL MEDICINE

## 2017-05-05 PROCEDURE — 83735 ASSAY OF MAGNESIUM: CPT | Performed by: INTERNAL MEDICINE

## 2017-05-05 PROCEDURE — 40000802 ZZH SITE CHECK

## 2017-05-05 PROCEDURE — 86140 C-REACTIVE PROTEIN: CPT | Performed by: INTERNAL MEDICINE

## 2017-05-05 PROCEDURE — 36415 COLL VENOUS BLD VENIPUNCTURE: CPT | Performed by: INTERNAL MEDICINE

## 2017-05-05 PROCEDURE — 99232 SBSQ HOSP IP/OBS MODERATE 35: CPT | Performed by: NURSE PRACTITIONER

## 2017-05-05 PROCEDURE — 99233 SBSQ HOSP IP/OBS HIGH 50: CPT | Performed by: INTERNAL MEDICINE

## 2017-05-05 PROCEDURE — 80048 BASIC METABOLIC PNL TOTAL CA: CPT | Performed by: INTERNAL MEDICINE

## 2017-05-05 PROCEDURE — 25000128 H RX IP 250 OP 636: Performed by: INTERNAL MEDICINE

## 2017-05-05 PROCEDURE — 25000132 ZZH RX MED GY IP 250 OP 250 PS 637: Performed by: INTERNAL MEDICINE

## 2017-05-05 PROCEDURE — 85027 COMPLETE CBC AUTOMATED: CPT | Performed by: INTERNAL MEDICINE

## 2017-05-05 RX ORDER — BISACODYL 10 MG
10 SUPPOSITORY, RECTAL RECTAL DAILY PRN
Status: DISCONTINUED | OUTPATIENT
Start: 2017-05-05 | End: 2017-05-10

## 2017-05-05 RX ADMIN — SENNOSIDES AND DOCUSATE SODIUM 2 TABLET: 8.6; 5 TABLET ORAL at 08:17

## 2017-05-05 RX ADMIN — LEVETIRACETAM 750 MG: 750 TABLET, FILM COATED ORAL at 08:16

## 2017-05-05 RX ADMIN — PANTOPRAZOLE SODIUM 40 MG: 40 TABLET, DELAYED RELEASE ORAL at 08:15

## 2017-05-05 RX ADMIN — ONDANSETRON 4 MG: 2 INJECTION INTRAMUSCULAR; INTRAVENOUS at 09:20

## 2017-05-05 RX ADMIN — TAMSULOSIN HYDROCHLORIDE 0.4 MG: 0.4 CAPSULE ORAL at 08:15

## 2017-05-05 RX ADMIN — Medication 100 MG: at 08:16

## 2017-05-05 RX ADMIN — ACETAMINOPHEN 650 MG: 325 TABLET, FILM COATED ORAL at 16:39

## 2017-05-05 RX ADMIN — ATORVASTATIN CALCIUM 10 MG: 10 TABLET, FILM COATED ORAL at 08:15

## 2017-05-05 RX ADMIN — LEVETIRACETAM 750 MG: 750 TABLET, FILM COATED ORAL at 20:07

## 2017-05-05 RX ADMIN — PANTOPRAZOLE SODIUM 40 MG: 40 TABLET, DELAYED RELEASE ORAL at 16:39

## 2017-05-05 RX ADMIN — ONDANSETRON 4 MG: 4 TABLET, ORALLY DISINTEGRATING ORAL at 16:39

## 2017-05-05 RX ADMIN — SIMETHICONE CHEW TAB 80 MG 80 MG: 80 TABLET ORAL at 16:39

## 2017-05-05 RX ADMIN — LORATADINE 10 MG: 10 TABLET ORAL at 08:16

## 2017-05-05 RX ADMIN — ACETAMINOPHEN 650 MG: 325 TABLET, FILM COATED ORAL at 08:17

## 2017-05-05 RX ADMIN — METOPROLOL SUCCINATE 50 MG: 50 TABLET, EXTENDED RELEASE ORAL at 08:17

## 2017-05-05 RX ADMIN — ACETAMINOPHEN 650 MG: 325 TABLET, FILM COATED ORAL at 22:45

## 2017-05-05 RX ADMIN — BISACODYL 10 MG: 10 SUPPOSITORY RECTAL at 13:37

## 2017-05-05 RX ADMIN — ALLOPURINOL 100 MG: 100 TABLET ORAL at 08:14

## 2017-05-05 RX ADMIN — METOPROLOL SUCCINATE 25 MG: 25 TABLET, EXTENDED RELEASE ORAL at 20:07

## 2017-05-05 ASSESSMENT — PAIN DESCRIPTION - DESCRIPTORS
DESCRIPTORS: ACHING

## 2017-05-05 NOTE — PLAN OF CARE
Problem: Goal Outcome Summary  Goal: Goal Outcome Summary  Outcome: No Change  Pt is alert and oriented x4; Gambian speaking; SBP 90s; 100% paced; PVCs noted this AM but resolved; afebrile. LVAD; Heartmate II; driveline intact/secured to abdomen; dressing changed; safety checks completed this shift. Speed 8800/Flow 4.2/PI 5.2/Power 5.4. Clear bilaterally; distended abdomen. Active bowel sounds; laxative and supp given; large BM x1. Daughter at bedside. Will continue to monitor.

## 2017-05-05 NOTE — PLAN OF CARE
Problem: Goal Outcome Summary  Goal: Goal Outcome Summary  Neuro: A&Ox4.   Cardiac: Paced 100% HR 60's. LVAD ranges unchanged. See flowsheet.   Respiratory: Sating 100 on RA.  GI/: [place] placed for retention. Unable to have BM, PRN senna given.  Diet/appetite: poor appetite. 50% of supper  Activity:  Assist of 2 with lift to chair.  Pain: C/o HA relieved by Tylenol.   Skin: Driveline site WNL, no drainage. Repositioned q2h.      R: Continue with POC. Notify primary team with changes.

## 2017-05-05 NOTE — PROGRESS NOTES
[facility] [place]  Internal Medicine Progress Note - Gold Service    Assessment & Plan   Sohan Rendon is a 67 year old male with chronic systolic heart failure 2/2 to ischemic CM s/p HM II LVAD placement in [place] now Destination Therapy due to multiple CVA's with residual left sided weakness c/b recurrent hemolysis and pump thrombus, CKD, who was recently admitted on 2/11-2/15 for appendicitis, managed conservatively; now presents with right sided abdominal pain.       1- Duodenitis causing RUQ abd pain: RUQ US showed gallbladder sludge with nonspecific mild-wall thickening, but CT showed no evidence of cholecystitis and his exam was not consistent with cholecystitis. No concern for cholangitis at this time with normal LFTs. Hepatitis unlikely given normal LFTs. Pancreatitis unlikely given normal lipase and amylase. CT abdomen on 4/29 did not show evidence of fat stranding around pancreas (however study was w/o contrast). C. Diff is possible given history of diarrhea, but unlikely without BM in 24 hours. Driveline site looks normal, so suspicion for driveline infection is low. Pulmonary etiology is also a possibility with new productive cough and elevated WBC, but CXR without obvious consolidation. PE unlikely with INR 2.93. Recurrence of appendicitis is another possibility, however improved CT on admission. Given lack of EtOH consumption, tobacco and NSAID use. Suspicious for H.pylori infection causing gastritis with duodenitis seen on CT imaging from 5/3.  Gen surgery was consulted for cholecystitis; think it is unlikely.  - Check for H. Pylori with next stool (as gastritis could be contributing to duodenitis)  - Increased PPI to 40mg BID- only for 2 wks  - No more famotidine given PPI use  - Gi cocktail once (helpful)  - No NSAIDs  - studies pending BM: sputum Cx, c. Diff and stool enteric pathogen   - zofran prn for nausea  - cards 2 consult given LVAD status- appreciate them  following, driveline looks good and no evidence of abscess on CT  - tylenol prior to meals (oxycodone prn if near bedtime)    2. Urinary retention: Hx of BPH, has been retaining. Truong was placed by 5/5  - plan to f/u with urology 1 wk after d/c to consider truong removal    3. Supratherapeutic INR: INR goal for LVAD 1.8-2.5.  INR on admission 2.93 (5/2 at 11 pm).  INR 4.19 on 5/4/2017 likely since he is not eating much. Decreased to 3.3 by 5/5.  - restart warfarin Saturday at 1.5 mg with INR recheck on Monday with home health    Chronic Conditions  Chronic systolic heart failure secondary to ischemic CM/ Stage D, NYHA Class III B/ S/p HM II LVAD placement in [place] now DT due to multiple CVAs with residual left sided weakness complicated by recurrent hemolysis and pump thrombus. Focus of care remains on quality of life.    --LVAD settings: PI 6.1, Speed 8800 rpm, Power 5.4 capps  --BB: Metoprolol 50mg am, 25mg qhs   --ACEI/ ARB: Losartan 25mg daily   --SCD prophylaxis: ICD  --Fluid status: euvolemic.   --INR goal: 1.8-2.3 (reduced in setting of gross hematuria)  --Antiplatelet agents: none  --Atorvastin 10mg PO daily  --on bedside interrogation, there were no recent LVAD alarms and driveline site looked normal w/o discharge  --      HTN: currently controlled, no issues with hypotension.   - cont PTA losartan and metoprolol      Multiple CVAs with residual and recurrent hemolysis/thrombus: Current baseline with left hemiparesis. No significant rehab potential. Continue coumadin and ASA as above.        History of seizure: Continue Levetiracetam 750mg PO BID       Gout: Allopurinol 100mg PO daily       Constipation: Bowel regimen prn       BPH: flomax 0.4mg daily       Iron Deficiency Anemia: Holding iron and multivitamins while inpatient     Diet: Regular Diet Adult  Calorie Counts, encourage drinking fluids  Fluids: encouraged to drink fluids so won't need a bolus today  DVT Prophylaxis: Warfarin-  supratherapeutic  Code Status: Full Code    Disposition Plan   Expected discharge: hopefully d/c to home on 5/6 if eats breakfast and drinks at least a liter on 5/5      Entered: Selene Frias 05/05/2017, 6:30 PM    The patient's care was discussed with his daughters and bedside nurses    Selene Frias  Internal Medicine Staff Hospitalist Service  [facility]  Pager: [phone]  Please see sticky note for cross cover information    Interval History   Justice ate a little yesterday and a little breakfast today. His daughter gave him her food from home for dinner, and he did not eat anything on his dinner tray.  He has been able to drink when encouraged and given a goal. He continues to have much less pain after eating with tylenol. Oxycodone makes him very sleepy and thus isn't as helpful for pain control.He now has a truong for his urinary retention.  Otherwise, 4 pt ROS neg    Data reviewed today: I reviewed all medications, new labs and imaging results over the last 24 hours.     Physical Exam   Vital Signs: Temp: 98.6  F (37  C) Temp src: Oral BP: 96/77 Pulse: 67 Heart Rate: 75 Resp: 18 SpO2: 100 % O2 Device: None (Room air)    Weight: 180 lbs 0 oz  General: Lying in bed with daughter by side. No acute distress   HEENT: arcus senilis risa; PERRL. EOM intact. Sclera non-icteric. MMM  Resp: Lungs clear to auscultation bilaterally.   CV: Hum of LVAD audible, cap refill < 3 sec  Abdominal: Tender to palpation on right of abd; BS+; mild distention  MSK: No gross abnormalities. Appropriate muscle mass  Skin: No bruising nor rashes in limited exam. No jaundice  Extremities: Trace LE edema  Neurological: CN's II-XII grossly intact. A&O     Data    ROUTINE IP LABS (Last four results)  BMP    Recent Labs  Lab 05/05/17  1023 05/03/17  0545 05/02/17  2247 05/02/17  0442    136 134 138   POTASSIUM 3.5 4.4 3.8 3.8   CHLORIDE 108 109 105 106   JOSÉ 8.0* 7.9* 8.5 8.6   CO2 17* 15* 20 23   BUN 16 14  15 16   CR 1.65* 1.45* 1.57* 1.78*   * 193* 208* 188*     CBC    Recent Labs  Lab 05/05/17  0621 05/04/17  0639 05/03/17  0545 05/02/17 2247   WBC 15.8* 14.9* 16.3* 15.7*   RBC 3.75* 3.89* 4.07* 4.34*   HGB 11.8* 12.3* 13.2* 13.7   HCT 35.4* 36.4* 39.4* 41.3   MCV 94 94 97 95   MCH 31.5 31.6 32.4 31.6   MCHC 33.3 33.8 33.5 33.2   RDW 16.6* 16.7* 16.9* 16.5*    163 163 179     INR    Recent Labs  Lab 05/05/17 0621 05/04/17  0639 05/02/17 2247 05/02/17  0442   INR 3.30* 4.19* 2.93* 2.51*

## 2017-05-05 NOTE — PLAN OF CARE
Problem: Goal Outcome Summary  Goal: Goal Outcome Summary  Outcome: No Change  Temp:  [97.6  F (36.4  C)-98.6  F (37  C)] 98.6  F (37  C)  Pulse:  [67] 67  Heart Rate:  [67-75] 75  Resp:  [16-18] 18  BP: ()/(73-82) 96/77  SpO2:  [100 %] 100 %    shift [id number]  Neuro: arouses to voice, slept most of shift. A/Ox4 per family interpreting.  Cardiac: NSR/paced. LVAD within parameters.   Respiratory:  RA  GI/: urine output adequate per truong    Diet/appetite: Regular diet, encouraged to eat. Poor appetite.   Activity: Turned q2.   Pain: received tylenol and mylicon x1 for abdominal pain.  Skin: No new deficits noted, drive line dressing intact and changed on day shift.       Continue with POC. Notify primary team with changes.

## 2017-05-05 NOTE — CONSULTS
[facility]   Cardiology II Consult/ Advanced Heart Failure  Daily Progress Note  Date of Service: 5/5/2017      Patient: Sohan Rendon  MRN: [id number]  Admission Date: 5/2/2017  Hospital Day # 1    Assessment and Plan: Mr. 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 He presents to ED for abdominal pain secondary to duodenitis. Cardiology consult requested per IM for LVAD management.     ICM s/p LVAD HM II. ICD 2011. 2012 as DT. 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   Stage D, NYHA Class III  ACEi/ARB: Losartan 25 mg po daily.   BB Toprol XL 50 mg in AM and 25 mg at hs.   Aldosterone antagonist contraindicated due to renal dysfunction.   SCD prophylaxis ICD  Fluid status Euvolemic.   MAP: 83.  LDH: 717 at baseline.   Anticoagulation: Coumadin per pharmacy. INR-3.30. Goal 2.5-3.  Antiplatelet: ASA remains on hold given recurrent hemolysis.   Low suspicion for driveline infection given clinical exam findings negative for site erythematous, drainage, or induration per RN evaluation. CT Chest/Abd/Pelvis negative for fluid collection surrounding LVAD.     Cardiology will sign off at this time. Please feel free to contact us with questions at any time or if need for repeat consultation is indicated at [phone].  ================================================================    Interval History/ROS: His daughter noted he is feeling well today. She did note a mild amount of swelling to his LE. His abdominal pain remains  "controlled on Tylenol.     Last 24 hr care team notes reviewed.   ROS:  4 point ROS including Respiratory, CV, GI and , other than that noted in the HPI, is negative.     Medications: Reviewed in EPIC.     Physical Exam:   BP 95/73 (BP Location: Right arm)  Pulse 67  Temp 97.6  F (36.4  C) (Oral)  Resp 18  Ht 1.727 m (5' 8\")  Wt 81.6 kg (180 lb)  SpO2 100%  BMI 27.37 kg/m2  GENERAL: Appears alert and oriented times three.   HEENT: Eye symmetrical and free of discharge bilaterally. Mucous membranes moist and without lesions.  NECK: Supple and without lymphadenopathy. JVD absent.   CV: S1S2 present with LVAD hum.   RESPIRATORY: Respirations regular, even, and unlabored. Lungs CTA throughout.   GI: Soft and mildly distended with normoactive bowel sounds present in all quadrants. No rebound or guarding. No organomegaly. Mild tenderness noted to RUQ.   EXTREMITIES: No peripheral edema. 2+ bilateral pedal pulses.   NEUROLOGIC: Alert and orientated x 3. CN II-XII grossly intact. No focal deficits.   MUSCULOSKELETAL: No joint swelling or tenderness.   SKIN: No jaundice. No rashes or lesions.     Data:  CMP  Recent Labs  Lab 05/05/17  1023 05/03/17  0545 05/02/17  2247 05/02/17  0442 04/29/17  0737    136 134 138 141   POTASSIUM 3.5 4.4 3.8 3.8 3.7   CHLORIDE 108 109 105 106 109   CO2 17* 15* 20 23 22   ANIONGAP 9 12 9 9 10   * 193* 208* 188* 157*   BUN 16 14 15 16 17   CR 1.65* 1.45* 1.57* 1.78* 1.63*   GFRESTIMATED 42* 49* 44* 38* 43*   GFRESTBLACK 51* 59* 54* 46* 51*   JOSÉ 8.0* 7.9* 8.5 8.6 8.6   MAG 1.8  --   --   --   --    PROTTOTAL  --   --  7.4 7.6 8.0   ALBUMIN  --   --  3.2* 3.3* 3.5   BILITOTAL  --   --  0.9 0.7 0.7   ALKPHOS  --   --  105 105 115   AST  --   --  27 33 38   ALT  --   --  10 10 15     CBC  Recent Labs  Lab 05/05/17  0621 05/04/17  0639 05/03/17  0545 05/02/17  2247   WBC 15.8* 14.9* 16.3* 15.7*   RBC 3.75* 3.89* 4.07* 4.34*   HGB 11.8* 12.3* 13.2* 13.7   HCT 35.4* 36.4* 39.4* " 41.3   MCV 94 94 97 95   MCH 31.5 31.6 32.4 31.6   MCHC 33.3 33.8 33.5 33.2   RDW 16.6* 16.7* 16.9* 16.5*    163 163 179     INR  Recent Labs  Lab 05/05/17  0621 05/04/17  0639 05/02/17  2247 05/02/17  0442   INR 3.30* 4.19* 2.93* 2.51*       Patient discussed with Dr. Butler.     Zuleika Patterson FNP  5/5/2017

## 2017-05-05 NOTE — PLAN OF CARE
Problem: Goal Outcome Summary  Goal: Goal Outcome Summary  Outcome: No Change  A/O X4. Family at bedside. Reposition q2hrs, assist of 2. HR 60-80's; 100% paced, VSS; afebrile;  on RA. adequate urine output per truong.Driveline dressing WNL. Will continue to monitor and follow POC

## 2017-05-06 ENCOUNTER — APPOINTMENT (OUTPATIENT)
Dept: CT IMAGING | Facility: CLINIC | Age: 67
DRG: 408 | End: 2017-05-06
Attending: INTERNAL MEDICINE
Payer: COMMERCIAL

## 2017-05-06 LAB
ALBUMIN UR-MCNC: 30 MG/DL
APPEARANCE UR: CLEAR
BILIRUB UR QL STRIP: NEGATIVE
COLOR UR AUTO: ABNORMAL
CRP SERPL-MCNC: 220 MG/L (ref 0–8)
ERYTHROCYTE [DISTWIDTH] IN BLOOD BY AUTOMATED COUNT: 16.3 % (ref 10–15)
GLUCOSE UR STRIP-MCNC: NEGATIVE MG/DL
HCT VFR BLD AUTO: 34.6 % (ref 40–53)
HGB BLD-MCNC: 11.5 G/DL (ref 13.3–17.7)
HGB UR QL STRIP: ABNORMAL
INR PPP: 2.65 (ref 0.86–1.14)
KETONES UR STRIP-MCNC: NEGATIVE MG/DL
LDH SERPL L TO P-CCNC: 548 U/L (ref 85–227)
LEUKOCYTE ESTERASE UR QL STRIP: ABNORMAL
MCH RBC QN AUTO: 31.6 PG (ref 26.5–33)
MCHC RBC AUTO-ENTMCNC: 33.2 G/DL (ref 31.5–36.5)
MCV RBC AUTO: 95 FL (ref 78–100)
NITRATE UR QL: NEGATIVE
PH UR STRIP: 7 PH (ref 5–7)
PLATELET # BLD AUTO: 195 10E9/L (ref 150–450)
PROCALCITONIN SERPL-MCNC: 0.49 NG/ML
RBC # BLD AUTO: 3.64 10E12/L (ref 4.4–5.9)
RBC #/AREA URNS AUTO: >182 /HPF (ref 0–2)
SP GR UR STRIP: 1.01 (ref 1–1.03)
URN SPEC COLLECT METH UR: ABNORMAL
UROBILINOGEN UR STRIP-MCNC: 8 MG/DL (ref 0–2)
WBC # BLD AUTO: 19 10E9/L (ref 4–11)
WBC #/AREA URNS AUTO: 163 /HPF (ref 0–2)

## 2017-05-06 PROCEDURE — 25000132 ZZH RX MED GY IP 250 OP 250 PS 637: Performed by: INTERNAL MEDICINE

## 2017-05-06 PROCEDURE — 99233 SBSQ HOSP IP/OBS HIGH 50: CPT | Performed by: INTERNAL MEDICINE

## 2017-05-06 PROCEDURE — 81001 URINALYSIS AUTO W/SCOPE: CPT | Performed by: INTERNAL MEDICINE

## 2017-05-06 PROCEDURE — 85027 COMPLETE CBC AUTOMATED: CPT | Performed by: INTERNAL MEDICINE

## 2017-05-06 PROCEDURE — 87086 URINE CULTURE/COLONY COUNT: CPT | Performed by: INTERNAL MEDICINE

## 2017-05-06 PROCEDURE — 85610 PROTHROMBIN TIME: CPT | Performed by: INTERNAL MEDICINE

## 2017-05-06 PROCEDURE — 25000128 H RX IP 250 OP 636: Performed by: INTERNAL MEDICINE

## 2017-05-06 PROCEDURE — 36415 COLL VENOUS BLD VENIPUNCTURE: CPT | Performed by: INTERNAL MEDICINE

## 2017-05-06 PROCEDURE — 87040 BLOOD CULTURE FOR BACTERIA: CPT | Performed by: INTERNAL MEDICINE

## 2017-05-06 PROCEDURE — 84145 PROCALCITONIN (PCT): CPT | Performed by: INTERNAL MEDICINE

## 2017-05-06 PROCEDURE — 12000006 ZZH R&B IMCU INTERMEDIATE UMMC

## 2017-05-06 PROCEDURE — 74176 CT ABD & PELVIS W/O CONTRAST: CPT

## 2017-05-06 PROCEDURE — 86140 C-REACTIVE PROTEIN: CPT | Performed by: INTERNAL MEDICINE

## 2017-05-06 PROCEDURE — 83615 LACTATE (LD) (LDH) ENZYME: CPT | Performed by: INTERNAL MEDICINE

## 2017-05-06 RX ORDER — WARFARIN SODIUM 1 MG/1
1 TABLET ORAL
Status: COMPLETED | OUTPATIENT
Start: 2017-05-06 | End: 2017-05-06

## 2017-05-06 RX ADMIN — SODIUM CHLORIDE 1000 ML: 9 INJECTION, SOLUTION INTRAVENOUS at 11:29

## 2017-05-06 RX ADMIN — PANTOPRAZOLE SODIUM 40 MG: 40 TABLET, DELAYED RELEASE ORAL at 17:06

## 2017-05-06 RX ADMIN — ALLOPURINOL 100 MG: 100 TABLET ORAL at 10:30

## 2017-05-06 RX ADMIN — ACETAMINOPHEN 650 MG: 325 TABLET, FILM COATED ORAL at 10:28

## 2017-05-06 RX ADMIN — TAMSULOSIN HYDROCHLORIDE 0.4 MG: 0.4 CAPSULE ORAL at 10:31

## 2017-05-06 RX ADMIN — OXYCODONE HYDROCHLORIDE 5 MG: 5 TABLET ORAL at 01:49

## 2017-05-06 RX ADMIN — ATORVASTATIN CALCIUM 10 MG: 10 TABLET, FILM COATED ORAL at 10:29

## 2017-05-06 RX ADMIN — BISACODYL 10 MG: 10 SUPPOSITORY RECTAL at 22:04

## 2017-05-06 RX ADMIN — WARFARIN SODIUM 1 MG: 1 TABLET ORAL at 17:06

## 2017-05-06 RX ADMIN — METOPROLOL SUCCINATE 25 MG: 25 TABLET, EXTENDED RELEASE ORAL at 19:52

## 2017-05-06 RX ADMIN — ACETAMINOPHEN 650 MG: 325 TABLET, FILM COATED ORAL at 17:06

## 2017-05-06 RX ADMIN — PANTOPRAZOLE SODIUM 40 MG: 40 TABLET, DELAYED RELEASE ORAL at 10:31

## 2017-05-06 RX ADMIN — ACETAMINOPHEN 650 MG: 325 TABLET, FILM COATED ORAL at 22:04

## 2017-05-06 RX ADMIN — LEVETIRACETAM 750 MG: 750 TABLET, FILM COATED ORAL at 19:52

## 2017-05-06 RX ADMIN — Medication 100 MG: at 10:29

## 2017-05-06 RX ADMIN — MELATONIN TAB 3 MG 3 MG: 3 TAB at 22:04

## 2017-05-06 RX ADMIN — BISACODYL 5 MG: 5 TABLET, COATED ORAL at 17:10

## 2017-05-06 RX ADMIN — LEVETIRACETAM 750 MG: 750 TABLET, FILM COATED ORAL at 10:32

## 2017-05-06 RX ADMIN — LORATADINE 10 MG: 10 TABLET ORAL at 10:30

## 2017-05-06 ASSESSMENT — PAIN DESCRIPTION - DESCRIPTORS
DESCRIPTORS: ACHING

## 2017-05-06 NOTE — PLAN OF CARE
Problem: Individualization  Goal: Patient Preferences  D: Pt stable, A&Ox4, LVAD #s WNL. RUQ pain relieved with oxycodone x 1. Daughter at bedside overnight. Daughter refused midnight and 0400 VS and assessments. VS taken when pt requested pain meds. Pt turned Q2hrs NOC. Truong with good UOP.  I: Monitored/assessed pt. Assisted with cares.  A: Pt stable and comfortable.  P: Continue to monitor/assess pt, contact provider with concerns. Plan to discharge to home with truong 5/6.

## 2017-05-06 NOTE — PROGRESS NOTES
[facility], [place]  Internal Medicine Progress Note - Gold Service    Assessment & Plan   Sohan Rendon is a 67 year old male with chronic systolic heart failure 2/2 to ischemic CM s/p HM II LVAD placement in [place] now Destination Therapy due to multiple CVA's with residual left sided weakness c/b recurrent hemolysis and pump thrombus, CKD, who was recently admitted on 2/11-2/15 for appendicitis, managed conservatively; now presents with right sided abdominal pain.       1- Duodenitis causing RUQ abd pain: RUQ US showed gallbladder sludge with nonspecific mild-wall thickening, but CT showed no evidence of cholecystitis and his exam was not consistent with cholecystitis. No concern for cholangitis at this time with normal LFTs. Hepatitis unlikely given normal LFTs. Pancreatitis unlikely given normal lipase and amylase. CT abdomen on 4/29 did not show evidence of fat stranding around pancreas (however study was w/o contrast). C. Diff is possible given history of diarrhea, but unlikely without BM in 24 hours. Driveline site looks normal, so suspicion for driveline infection is low. Pulmonary etiology is unlikely as he has no cough and no consolidation or hypoxemia or increased work of breathing or fevers. Gen surgery was consulted for cholecystitis; think it is unlikely. Suspicious for H.pylori infection causing gastritis with duodenitis seen on CT imaging from 5/3.    - Check for H. Pylori with next stool (as gastritis could be contributing to duodenitis)  - Increased PPI to 40mg BID- only for 2 wks  - Gi cocktail once (helpful)  - No NSAIDs  - studies pending BM: sputum Cx, c. Diff and stool enteric pathogen   - zofran prn for nausea  - cards 2 consult given LVAD status- appreciate them following, driveline looks good and no evidence of abscess on CT  - tylenol prior to meals (oxycodone prn if near bedtime)    2. Increasing leukocytosis and CRP: afebrile, no pulm sx. Ddx: UTI,  asymptomatic bacteremia, c diff. Assessing the driveline during daily dressing changes- no evidence of infection  - UA  - bld cx  - LD pending, procal pending  - stool samples (he has had 3 stools, but the samples have never been saved. This has been emphasized with his daughters, and we need to send his next BM to the lab)    3. Urinary retention: Hx of BPH, has been retaining. Truong was placed by 5/5  - plan to f/u with urology 1 wk after d/c to consider truong removal    4. Supratherapeutic INR: INR goal for LVAD 1.8-2.5.  INR on admission 2.93 (5/2 at 11 pm).  INR 4.19 on 5/4/2017 likely since he is not eating much. Decreased to 3.3 by 5/5.  - restart warfarin Saturday at 1.5 mg with INR recheck on Monday with home health if discharged before then    Chronic Conditions  Chronic systolic heart failure secondary to ischemic CM/ Stage D, NYHA Class III B/ S/p HM II LVAD placement in [place] now DT due to multiple CVAs with residual left sided weakness complicated by recurrent hemolysis and pump thrombus. Focus of care remains on quality of life.    --LVAD settings: PI 6.1, Speed 8800 rpm, Power 5.4 capps  --BB: Metoprolol 50mg am, 25mg qhs   --ACEI/ ARB: Losartan 25mg daily   --SCD prophylaxis: ICD  --Fluid status: euvolemic.   --INR goal: 1.8-2.3 (reduced in setting of gross hematuria)  --Antiplatelet agents: none  --Atorvastin 10mg PO daily  --on bedside interrogation, there were no recent LVAD alarms and driveline site looked normal w/o discharge  --      HTN: currently controlled, no issues with hypotension.   - cont PTA losartan and metoprolol      Multiple CVAs with residual and recurrent hemolysis/thrombus: Current baseline with left hemiparesis. No significant rehab potential. Continue coumadin and ASA as above.        History of seizure: Continue Levetiracetam 750mg PO BID       Gout: Allopurinol 100mg PO daily       Constipation: Bowel regimen prn       BPH: flomax 0.4mg daily       Iron  Deficiency Anemia: Holding iron and multivitamins while inpatient     Diet: Regular Diet Adult  Calorie Counts, encourage drinking fluids  Fluids: encouraged to drink fluids, but 1 L bolus today as he is very sleepy to drink  DVT Prophylaxis: Warfarin- supratherapeutic  Code Status: Full Code    Disposition Plan   Expected discharge: 1-2 days, after inflammatory markers begin to stabilize or trend down     Entered: Selene Frias 05/06/2017, 12:08 PM    The patient's care was discussed with his daughters and bedside nurses    Selene Frias  Internal Medicine Staff Hospitalist Service  [facility]  Pager: [phone]  Please see sticky note for cross cover information    Interval History   Justice ate a little yesterday and a little breakfast today. His daughter gave him her food from home for dinner, and he did not eat anything on his dinner tray.  He has been able to drink when encouraged and given a goal. He continues to have much less pain after eating with tylenol. Oxycodone makes him very sleepy and thus isn't as helpful for pain control.He now has a truong for his urinary retention.  Otherwise, 4 pt ROS neg    Data reviewed today: I reviewed all medications, new labs and imaging results over the last 24 hours.     Physical Exam   Vital Signs: Temp: 98.6  F (37  C) Temp src: Oral BP: (!) 88/69   Heart Rate: 84 Resp: 18 SpO2: 100 % O2 Device: None (Room air)    Weight: 180 lbs 0 oz  General: Lying in bed with daughter by side. No acute distress   HEENT: arcus senilis risa; PERRL. EOM intact. Sclera non-icteric. MMM  Resp: Lungs clear to auscultation bilaterally.   CV: Hum of LVAD audible, cap refill < 3 sec  Abdominal: Tender to palpation on right of abd; BS+; mild distention  MSK: No gross abnormalities. Appropriate muscle mass  Skin: No bruising nor rashes in limited exam. No jaundice  Extremities: +1 BLE edema  Neurological: CN's II-XII grossly intact. A&O     Data    ROUTINE IP LABS  (Last four results)  BMP    Recent Labs  Lab 05/05/17  1023 05/03/17  0545 05/02/17  2247 05/02/17  0442    136 134 138   POTASSIUM 3.5 4.4 3.8 3.8   CHLORIDE 108 109 105 106   JOSÉ 8.0* 7.9* 8.5 8.6   CO2 17* 15* 20 23   BUN 16 14 15 16   CR 1.65* 1.45* 1.57* 1.78*   * 193* 208* 188*     CBC    Recent Labs  Lab 05/06/17  0731 05/05/17  0621 05/04/17  0639 05/03/17  0545   WBC 19.0* 15.8* 14.9* 16.3*   RBC 3.64* 3.75* 3.89* 4.07*   HGB 11.5* 11.8* 12.3* 13.2*   HCT 34.6* 35.4* 36.4* 39.4*   MCV 95 94 94 97   MCH 31.6 31.5 31.6 32.4   MCHC 33.2 33.3 33.8 33.5   RDW 16.3* 16.6* 16.7* 16.9*    177 163 163     INR    Recent Labs  Lab 05/06/17  0731 05/05/17  0621 05/04/17 0639 05/02/17 2247   INR 2.65* 3.30* 4.19* 2.93*

## 2017-05-06 NOTE — PLAN OF CARE
Problem: Goal Outcome Summary  Goal: Goal Outcome Summary  Outcome: No Change  Neuro: A/Ox4.  Cardiac: SR with HR 80's. VSS. LVAD stable without any alarms.   Respiratory: O2 sats stable on RA.  GI/: [place] intact with adequate UOP.  Diet/appetite: Regular diet. Goal to increase PO intake.   Activity:  Turn and repo.   Pain: At acceptable level on current regimen.   Skin: Intact, no new deficits noted.     R: Continue with POC. Notify primary team with changes.

## 2017-05-06 NOTE — PLAN OF CARE
"Problem: Goal Outcome Summary  Goal: Goal Outcome Summary  Outcome: No Change  BP 97/79 (BP Location: Right arm)  Pulse 67  Temp 98.3  F (36.8  C) (Oral)  Resp 17  Ht 1.727 m (5' 8\")  Wt 81.6 kg (180 lb)  SpO2 98%  BMI 27.37 kg/m2     VSS, RA, A/Ox4, LVAD heartmate II. Pt c/o R. abdominal pain, gave prn Tylenol with some effects. Denied nausea. Mc[place] intact with adequate UOP, no BM.  Repositioned q2h. Daughter at bedside. Will continue to monitor.      "

## 2017-05-06 NOTE — PLAN OF CARE
Problem: Goal Outcome Summary  Goal: Goal Outcome Summary  Outcome: Declining  Pt is alert and oriented x4; [place] speaking; BPs soft SBP 80s - 90s; losartan and metoprolol held this AM; 100% paced 60s - 70s; afebrile. C/o R abdominal pain; unchanged from baseline; treating with prn tylenol prior to meals. Clear bilaterally; abdomen distended. Having good food intake and fluid intake; however, 1L bolus of NS given today. LVAD Heartmate II; driveline C/D/I; dressing changed/safety check completed. WBC trending up; UA sent. Blood noted in truong; no intervention at this time. INR stabilizing. Blood cultures sent. Plan for CT of abdomen/pelvis this evening.  Discharge possibly tomorrow. Will continue to assess/monitor.

## 2017-05-07 ENCOUNTER — APPOINTMENT (OUTPATIENT)
Dept: ULTRASOUND IMAGING | Facility: CLINIC | Age: 67
DRG: 408 | End: 2017-05-07
Attending: INTERNAL MEDICINE
Payer: COMMERCIAL

## 2017-05-07 LAB
ALBUMIN SERPL-MCNC: 2.3 G/DL (ref 3.4–5)
ALP SERPL-CCNC: 164 U/L (ref 40–150)
ALT SERPL W P-5'-P-CCNC: 31 U/L (ref 0–70)
ANION GAP SERPL CALCULATED.3IONS-SCNC: 10 MMOL/L (ref 3–14)
AST SERPL W P-5'-P-CCNC: 43 U/L (ref 0–45)
BACTERIA SPEC CULT: NORMAL
BILIRUB SERPL-MCNC: 1.6 MG/DL (ref 0.2–1.3)
BUN SERPL-MCNC: 20 MG/DL (ref 7–30)
C DIFF TOX B STL QL: NORMAL
CALCIUM SERPL-MCNC: 8.3 MG/DL (ref 8.5–10.1)
CAMPYLOBACTER GROUP BY NAT: NOT DETECTED
CHLORIDE SERPL-SCNC: 110 MMOL/L (ref 94–109)
CO2 SERPL-SCNC: 19 MMOL/L (ref 20–32)
CREAT SERPL-MCNC: 1.75 MG/DL (ref 0.66–1.25)
ENTERIC PATHOGEN COMMENT: NORMAL
ERYTHROCYTE [DISTWIDTH] IN BLOOD BY AUTOMATED COUNT: 16.6 % (ref 10–15)
GFR SERPL CREATININE-BSD FRML MDRD: 39 ML/MIN/1.7M2
GLUCOSE SERPL-MCNC: 151 MG/DL (ref 70–99)
HCT VFR BLD AUTO: 33.2 % (ref 40–53)
HGB BLD-MCNC: 11.1 G/DL (ref 13.3–17.7)
INR PPP: 2.04 (ref 0.86–1.14)
MCH RBC QN AUTO: 31.4 PG (ref 26.5–33)
MCHC RBC AUTO-ENTMCNC: 33.4 G/DL (ref 31.5–36.5)
MCV RBC AUTO: 94 FL (ref 78–100)
MICRO REPORT STATUS: NORMAL
NOROVIRUS I AND II BY NAT: NOT DETECTED
PLATELET # BLD AUTO: 180 10E9/L (ref 150–450)
POTASSIUM SERPL-SCNC: 3.5 MMOL/L (ref 3.4–5.3)
PROCALCITONIN SERPL-MCNC: 0.39 NG/ML
PROT SERPL-MCNC: 6.4 G/DL (ref 6.8–8.8)
RBC # BLD AUTO: 3.53 10E12/L (ref 4.4–5.9)
ROTAVIRUS A BY NAT: NOT DETECTED
SALMONELLA SPECIES BY NAT: NOT DETECTED
SHIGA TOXIN 1 GENE BY NAT: NOT DETECTED
SHIGA TOXIN 2 GENE BY NAT: NOT DETECTED
SHIGELLA SP+EIEC IPAH STL QL NAA+PROBE: NOT DETECTED
SODIUM SERPL-SCNC: 139 MMOL/L (ref 133–144)
SPECIMEN SOURCE: NORMAL
SPECIMEN SOURCE: NORMAL
VIBRIO GROUP BY NAT: NOT DETECTED
WBC # BLD AUTO: 15.7 10E9/L (ref 4–11)
YERSINIA ENTEROCOLITICA BY NAT: NOT DETECTED

## 2017-05-07 PROCEDURE — 99233 SBSQ HOSP IP/OBS HIGH 50: CPT | Performed by: INTERNAL MEDICINE

## 2017-05-07 PROCEDURE — 85610 PROTHROMBIN TIME: CPT | Performed by: INTERNAL MEDICINE

## 2017-05-07 PROCEDURE — 25000132 ZZH RX MED GY IP 250 OP 250 PS 637: Performed by: INTERNAL MEDICINE

## 2017-05-07 PROCEDURE — 85027 COMPLETE CBC AUTOMATED: CPT | Performed by: INTERNAL MEDICINE

## 2017-05-07 PROCEDURE — 80053 COMPREHEN METABOLIC PANEL: CPT | Performed by: INTERNAL MEDICINE

## 2017-05-07 PROCEDURE — 84145 PROCALCITONIN (PCT): CPT | Performed by: INTERNAL MEDICINE

## 2017-05-07 PROCEDURE — 76705 ECHO EXAM OF ABDOMEN: CPT

## 2017-05-07 PROCEDURE — 87493 C DIFF AMPLIFIED PROBE: CPT | Performed by: INTERNAL MEDICINE

## 2017-05-07 PROCEDURE — 87338 HPYLORI STOOL AG IA: CPT | Performed by: INTERNAL MEDICINE

## 2017-05-07 PROCEDURE — 87506 IADNA-DNA/RNA PROBE TQ 6-11: CPT | Performed by: INTERNAL MEDICINE

## 2017-05-07 PROCEDURE — 25000128 H RX IP 250 OP 636: Performed by: INTERNAL MEDICINE

## 2017-05-07 PROCEDURE — 36415 COLL VENOUS BLD VENIPUNCTURE: CPT | Performed by: INTERNAL MEDICINE

## 2017-05-07 PROCEDURE — 12000006 ZZH R&B IMCU INTERMEDIATE UMMC

## 2017-05-07 RX ORDER — OLOPATADINE HYDROCHLORIDE 1 MG/ML
1 SOLUTION/ DROPS OPHTHALMIC 2 TIMES DAILY
Status: DISCONTINUED | OUTPATIENT
Start: 2017-05-07 | End: 2017-05-15 | Stop reason: HOSPADM

## 2017-05-07 RX ORDER — PIPERACILLIN SODIUM, TAZOBACTAM SODIUM 3; .375 G/15ML; G/15ML
3.38 INJECTION, POWDER, LYOPHILIZED, FOR SOLUTION INTRAVENOUS EVERY 6 HOURS
Status: DISCONTINUED | OUTPATIENT
Start: 2017-05-07 | End: 2017-05-11

## 2017-05-07 RX ORDER — PANTOPRAZOLE SODIUM 40 MG/1
40 TABLET, DELAYED RELEASE ORAL
Status: DISCONTINUED | OUTPATIENT
Start: 2017-05-08 | End: 2017-05-15 | Stop reason: HOSPADM

## 2017-05-07 RX ORDER — CARBOXYMETHYLCELLULOSE SODIUM 5 MG/ML
1 SOLUTION/ DROPS OPHTHALMIC 3 TIMES DAILY PRN
Status: DISCONTINUED | OUTPATIENT
Start: 2017-05-07 | End: 2017-05-15 | Stop reason: HOSPADM

## 2017-05-07 RX ORDER — WARFARIN SODIUM 2 MG/1
2 TABLET ORAL
Status: COMPLETED | OUTPATIENT
Start: 2017-05-07 | End: 2017-05-07

## 2017-05-07 RX ADMIN — BISACODYL 10 MG: 10 SUPPOSITORY RECTAL at 10:50

## 2017-05-07 RX ADMIN — BISACODYL 5 MG: 5 TABLET, COATED ORAL at 10:49

## 2017-05-07 RX ADMIN — MELATONIN TAB 3 MG 3 MG: 3 TAB at 22:37

## 2017-05-07 RX ADMIN — LEVETIRACETAM 750 MG: 750 TABLET, FILM COATED ORAL at 08:39

## 2017-05-07 RX ADMIN — LOSARTAN POTASSIUM 25 MG: 25 TABLET, FILM COATED ORAL at 08:39

## 2017-05-07 RX ADMIN — LEVETIRACETAM 750 MG: 750 TABLET, FILM COATED ORAL at 20:25

## 2017-05-07 RX ADMIN — ATORVASTATIN CALCIUM 10 MG: 10 TABLET, FILM COATED ORAL at 08:39

## 2017-05-07 RX ADMIN — METOPROLOL SUCCINATE 50 MG: 50 TABLET, EXTENDED RELEASE ORAL at 08:39

## 2017-05-07 RX ADMIN — PANTOPRAZOLE SODIUM 40 MG: 40 TABLET, DELAYED RELEASE ORAL at 08:38

## 2017-05-07 RX ADMIN — OLOPATADINE HYDROCHLORIDE 1 DROP: 1 SOLUTION/ DROPS OPHTHALMIC at 20:27

## 2017-05-07 RX ADMIN — Medication 100 MG: at 08:39

## 2017-05-07 RX ADMIN — PIPERACILLIN SODIUM,TAZOBACTAM SODIUM 3.38 G: 3; .375 INJECTION, POWDER, FOR SOLUTION INTRAVENOUS at 15:14

## 2017-05-07 RX ADMIN — LORATADINE 10 MG: 10 TABLET ORAL at 08:39

## 2017-05-07 RX ADMIN — TAMSULOSIN HYDROCHLORIDE 0.4 MG: 0.4 CAPSULE ORAL at 08:39

## 2017-05-07 RX ADMIN — OXYCODONE HYDROCHLORIDE 5 MG: 5 TABLET ORAL at 22:42

## 2017-05-07 RX ADMIN — PIPERACILLIN SODIUM,TAZOBACTAM SODIUM 3.38 G: 3; .375 INJECTION, POWDER, FOR SOLUTION INTRAVENOUS at 20:25

## 2017-05-07 RX ADMIN — ALLOPURINOL 100 MG: 100 TABLET ORAL at 08:39

## 2017-05-07 RX ADMIN — PANTOPRAZOLE SODIUM 40 MG: 40 TABLET, DELAYED RELEASE ORAL at 15:50

## 2017-05-07 RX ADMIN — METOPROLOL SUCCINATE 25 MG: 25 TABLET, EXTENDED RELEASE ORAL at 20:25

## 2017-05-07 RX ADMIN — ACETAMINOPHEN 650 MG: 325 TABLET, FILM COATED ORAL at 08:38

## 2017-05-07 RX ADMIN — WARFARIN SODIUM 2 MG: 2 TABLET ORAL at 19:36

## 2017-05-07 RX ADMIN — SODIUM CHLORIDE 1000 ML: 9 INJECTION, SOLUTION INTRAVENOUS at 07:52

## 2017-05-07 RX ADMIN — ACETAMINOPHEN 650 MG: 325 TABLET, FILM COATED ORAL at 15:14

## 2017-05-07 NOTE — PROGRESS NOTES
[facility], [place]  Internal Medicine Progress Note - Gold Service    Assessment & Plan   Sohan Rendon is a 67 year old male with chronic systolic heart failure 2/2 to ischemic CM s/p HM II LVAD placement in [place] now Destination Therapy due to multiple CVA's with residual left sided weakness c/b recurrent hemolysis and pump thrombus, CKD, who was recently admitted on 2/11-2/15 for appendicitis, managed conservatively; now presenting with right sided abdominal pain that is due to acalculous cholecystitis.       1- Acalculous cholecystitis vs cholecystitis with stones (not seen on imaging): RUQ US showed gallbladder sludge with nonspecific mild-wall thickening on admission, and his exam was not consistent with cholecystitis. Driveline site looks normal and there is no fluid collection in his CT so suspicion for driveline infection is low. Pulmonary,  Gen surgery was consulted for cholecystitis on admission, but they thought it was unlikely based on imaging and exam and hx. Procal, CRP, WBC rising prompted RUQ US repeat imaging on 5/7--> gb wall doubled in size with new pericholecystic fluid and sludge, and Tbili and ALP are also elevated.   - No NSAIDs  - zofran prn for nausea  - tylenol prn (oxycodone prn if near bedtime)  - bld cx from admission with no growth, repeat cultures also with no growth  - UA pending  - zoyn initiated  - surg consulted- recommended to see if GI would want to consider transcystic stenting given Tbili elevation  - gi to be consulted on 5/8- consider ERCP and transcystic stenting (would require gen anesthesia- will discuss with cards2) vs perc cholecystostomy tube (would be placed by IR on Tues)    2. Duodenitis: on CT on admission, improved on repeat CT, irritation likely caused by acute cholecystitis  - h pylori pending  - Increased PPI to 40mg BID--> now back to once daily  - studies pending: c. Diff and stool enteric pathogen     3. Urinary retention: Hx  of BPH, has been retaining. Truong was placed by 5/5  - plan to f/u with urology 1 wk after d/c to consider truong removal    4. Supratherapeutic INR: INR goal for LVAD 1.8-2.5.  INR on admission 2.93 (5/2 at 11 pm).  INR 4.19 on 5/4/2017 likely since he is not eating much. Decreased to 2.04 by 5/7.  - pharmacy to help with warfarin dosing    Chronic Conditions  Chronic systolic heart failure secondary to ischemic CM/ Stage D, NYHA Class III B/ S/p HM II LVAD placement in [place] now DT due to multiple CVAs with residual left sided weakness complicated by recurrent hemolysis and pump thrombus. Focus of care remains on quality of life.    --LVAD settings: PI 6.1, Speed 8800 rpm, Power 5.4 capps  --BB: Metoprolol 50mg am, 25mg qhs   --ACEI/ ARB: Losartan 25mg daily   --SCD prophylaxis: ICD  --Fluid status: euvolemic.   --INR goal: 1.8-2.3 (reduced in setting of gross hematuria)  --Antiplatelet agents: none  --Atorvastin 10mg PO daily  --on bedside interrogation, there were no recent LVAD alarms and driveline site looked normal w/o discharge  --  --cards 2 consult given LVAD status- appreciate them following, driveline looks good and no evidence of abscess on CT      HTN: currently controlled, no issues with hypotension.   - cont PTA losartan and metoprolol      Multiple CVAs with residual and recurrent hemolysis/thrombus: Current baseline with left hemiparesis. No significant rehab potential. Continue coumadin and ASA as above.        History of seizure: Continue Levetiracetam 750mg PO BID       Gout: Allopurinol 100mg PO daily       Constipation: Bowel regimen prn       BPH: flomax 0.4mg daily       Iron Deficiency Anemia: Holding iron and multivitamins while inpatient     Diet: Regular Diet Adult  Calorie Counts, encourage drinking fluids  Fluids: encouraged to drink fluids, but 1 L bolus today as with infection and poor intake  DVT Prophylaxis: Warfarin therapeutic  Code Status: Full Code    Disposition Plan    Expected discharge: 3-5 days, after ERCP or perc cholecystostomy placement     Entered: Selene Frias 05/07/2017, 4:30 PM    The patient's care was discussed with his daughters and bedside nurses    Selene Frias  Internal Medicine Staff Hospitalist Service  [facility]  Pager: [phone]  Please see sticky note for cross cover information    Interval History   Justice ate a little yesterday and a little breakfast today. His daughter gave him her food from home for dinner, and he did not eat anything on his dinner tray.  He has been able to drink when encouraged and given a goal. He continues to have much less pain after eating with tylenol. Oxycodone makes him very sleepy and thus isn't as helpful for pain control.He now has a truong for his urinary retention.  Otherwise, 4 pt ROS neg    Data reviewed today: I reviewed all medications, new labs and imaging results over the last 24 hours.     Physical Exam   Vital Signs: Temp: 97.5  F (36.4  C) Temp src: Oral BP: 103/70   Heart Rate: 68 Resp: 22 SpO2: 100 % O2 Device: None (Room air)    Weight: 180 lbs 0 oz  General: Lying in bed with family by side. No acute distress   HEENT: arcus senilis risa; PERRL. EOM intact. Sclera non-icteric risa. MMM  Resp: Lungs clear to auscultation bilaterally.   CV: Hum of LVAD audible, cap refill < 3 sec  Abdominal: Tender to palpation on right of abd; BS+; mild distention  MSK: No gross abnormalities. Appropriate muscle mass  Skin: No bruising nor rashes in limited exam. No jaundice  Extremities: +1 BLE edema  Neurological: CN's II-XII grossly intact. A&O     Data    ROUTINE IP LABS (Last four results)  BMP    Recent Labs  Lab 05/07/17  0725 05/05/17  1023 05/03/17  0545 05/02/17  2247    134 136 134   POTASSIUM 3.5 3.5 4.4 3.8   CHLORIDE 110* 108 109 105   JOSÉ 8.3* 8.0* 7.9* 8.5   CO2 19* 17* 15* 20   BUN 20 16 14 15   CR 1.75* 1.65* 1.45* 1.57*   * 239* 193* 208*     CBC    Recent Labs  Lab  05/07/17 0725 05/06/17  0731 05/05/17  0621 05/04/17  0639   WBC 15.7* 19.0* 15.8* 14.9*   RBC 3.53* 3.64* 3.75* 3.89*   HGB 11.1* 11.5* 11.8* 12.3*   HCT 33.2* 34.6* 35.4* 36.4*   MCV 94 95 94 94   MCH 31.4 31.6 31.5 31.6   MCHC 33.4 33.2 33.3 33.8   RDW 16.6* 16.3* 16.6* 16.7*    195 177 163     INR    Recent Labs  Lab 05/07/17 0725 05/06/17 0731 05/05/17 0621 05/04/17  0639   INR 2.04* 2.65* 3.30* 4.19*     Liver Function Studies -   Recent Labs   Lab Test  05/07/17 0725   PROTTOTAL  6.4*   ALBUMIN  2.3*   BILITOTAL  1.6*   ALKPHOS  164*   AST  43   ALT  31       CT 5/6/2017 IMPRESSION:  1. Although limited by lack of intravenous contrast, no definitive CT  evidence of infection associated with the left ventricular assist  device.  2. No significant change in opacities of the lower lobes, right  greater the left, which may represent atelectasis with possible  superimposed pneumonia.  3. Persistent thickening of the rectum, which may be due to stool  burden and or proctitis.   5. Moderate cardiomegaly. Mild dilatation of the main pulmonary  artery, measuring up to 3.4 cm in diameter. The latter finding may  represent some degree of underlying pulmonary arterial hypertension.  6. Near-complete resolution of previously demonstrated fat stranding  adjacent to the second portion of the duodenum.  7. Significant distention of the gallbladder, which may be due to  fasting state. No radiopaque gallstones noted. No adjacent fat  stranding or CT findings to suggest acute cholecystitis.  8. Hypoattenuating right thyroid nodule, which occupies the majority  of the gland. 9. Groundglass pulmonary nodule within the right middle  lobe, which may be infectious. No significant change from prior study  5/3/2017.    RUQ US 5/7/2017 IMPRESSION:   Increased gallbladder wall thickening of 7.4 mm, previously 3.5, and  new pericholecystic fluid, with redemonstrated gallbladder sludge.  Differential remains acalculus  cholecystitis versus changes secondary  to hepatic dysfunction.

## 2017-05-07 NOTE — PROGRESS NOTES
"Surgery  5/7/2017    Called by Dr. Frias regarding new ultrasound findings of gallbladder wall thickening and pericholecystic fluid. Pt still with pain in RUQ near drive line for LVAD. Currently eating lunch. Conversation translated by family members.     Blood pressure 102/80, pulse 67, temperature 97.8  F (36.6  C), temperature source Oral, resp. rate 20, height 1.727 m (5' 8\"), weight 81.6 kg (180 lb), SpO2 100 %.    NAD, sitting upright in bed  NLB  Soft, drive line in epigastrium covered by dressing. Tender to palp in RUQ. Non-peritoneal.    139 110 20 / 151  3.5 119 1.75 \    Alk 164  ALT 31  AST 43  tBili 1.6    WBC 15.7  INR 2.04    5/7 Abd US: Thick GB wall 7.4mm, new pericholecystic fluid, sludge.    Impression:  67M with ICM with LVAD in place and anticoagulated with acute cholecystitis, possibly acalculous.     Recs:  - IV antibiotics  - GI consult to evaluate for new hyperbilirubinemia. May warrant MRCP to evaluate for stone/sludge burden that necessitates biliary decompression.  - If transcystic stenting not an option with GI, would recommend an IR consult for percutaneous cholecystostomy tube.  - Daily LFTs    Discussed with Dr. Frias, general surgery chief resident.    Javier Reynolds MD  General Surgery PGY-2  Pager [phone]      "

## 2017-05-07 NOTE — PROGRESS NOTES
Calorie Counts    Intake recorded for: 5/6/2017  Kcals: 464  Protein: 26g    # Meals recorded: 100% apple juice, 75% chicken breast, rice, corn, mashed potato with gravy    # Supplements recorded: 0

## 2017-05-07 NOTE — PROGRESS NOTES
[id number]. AAOx4. Temp: 97.5  F (36.4  C) Temp src: Oral BP: 103/70   Heart Rate: 68 Resp: 22 SpO2: 100 % O2 Device: None (Room air)   NSR 60s, paced on demand. LVAD. Abdominal distension and discomfort with deep breaths. Turn q2hr. Tolerating oral intake. Mcarthur with adequate urine output. Awaiting GI consult. Daughter at bedside and supportive.

## 2017-05-07 NOTE — PLAN OF CARE
"Problem: Goal Outcome Summary  Goal: Goal Outcome Summary  Outcome: Improving  /70 (BP Location: Right arm)  Pulse 67  Temp 97.5  F (36.4  C) (Oral)  Resp 22  Ht 1.727 m (5' 8\")  Wt 81.6 kg (180 lb)  SpO2 100%  BMI 27.37 kg/m2     AOx4, Congolese speaking,  present for rounds with MD, daughter at bedside all day.  VSS, SR, HeartMate II, no alarms this shift, afebrile, on room air.  Received Tylenol for abdominal pain and headache, overall patient states RUQ pain is improving, tolerating about 50% of meals.  Cmarthur in place for urinary retention, good urine output, no BM today, suppository and laxative given.  RUQ ultrasound completed, started on IV antibiotics for cholecystitis.      "

## 2017-05-08 ENCOUNTER — OFFICE VISIT (OUTPATIENT)
Dept: INTERPRETER SERVICES | Facility: CLINIC | Age: 67
End: 2017-05-08

## 2017-05-08 ENCOUNTER — APPOINTMENT (OUTPATIENT)
Dept: GENERAL RADIOLOGY | Facility: CLINIC | Age: 67
DRG: 408 | End: 2017-05-08
Attending: INTERNAL MEDICINE
Payer: COMMERCIAL

## 2017-05-08 LAB
ALBUMIN SERPL-MCNC: 2.3 G/DL (ref 3.4–5)
ALP SERPL-CCNC: 228 U/L (ref 40–150)
ALT SERPL W P-5'-P-CCNC: 38 U/L (ref 0–70)
ANION GAP SERPL CALCULATED.3IONS-SCNC: 10 MMOL/L (ref 3–14)
AST SERPL W P-5'-P-CCNC: 77 U/L (ref 0–45)
BILIRUB DIRECT SERPL-MCNC: 1.5 MG/DL (ref 0–0.2)
BILIRUB SERPL-MCNC: 2 MG/DL (ref 0.2–1.3)
BLD PROD TYP BPU: NORMAL
BUN SERPL-MCNC: 18 MG/DL (ref 7–30)
CALCIUM SERPL-MCNC: 8.4 MG/DL (ref 8.5–10.1)
CHLORIDE SERPL-SCNC: 110 MMOL/L (ref 94–109)
CO2 SERPL-SCNC: 18 MMOL/L (ref 20–32)
CREAT SERPL-MCNC: 1.63 MG/DL (ref 0.66–1.25)
ERYTHROCYTE [DISTWIDTH] IN BLOOD BY AUTOMATED COUNT: 16.4 % (ref 10–15)
ERYTHROCYTE [DISTWIDTH] IN BLOOD BY AUTOMATED COUNT: 16.4 % (ref 10–15)
GFR SERPL CREATININE-BSD FRML MDRD: 43 ML/MIN/1.7M2
GLUCOSE SERPL-MCNC: 144 MG/DL (ref 70–99)
H PYLORI AG STL QL IA: NORMAL
HCT VFR BLD AUTO: 33 % (ref 40–53)
HCT VFR BLD AUTO: 37.4 % (ref 40–53)
HGB BLD-MCNC: 10.9 G/DL (ref 13.3–17.7)
HGB BLD-MCNC: 12.7 G/DL (ref 13.3–17.7)
INR PPP: 1.94 (ref 0.86–1.14)
LACTATE BLD-SCNC: 1.5 MMOL/L (ref 0.7–2.1)
LMWH PPP CHRO-ACNC: 0.7 IU/ML
MCH RBC QN AUTO: 30.9 PG (ref 26.5–33)
MCH RBC QN AUTO: 31.2 PG (ref 26.5–33)
MCHC RBC AUTO-ENTMCNC: 33 G/DL (ref 31.5–36.5)
MCHC RBC AUTO-ENTMCNC: 34 G/DL (ref 31.5–36.5)
MCV RBC AUTO: 92 FL (ref 78–100)
MCV RBC AUTO: 94 FL (ref 78–100)
MICRO REPORT STATUS: NORMAL
NUM BPU REQUESTED: 1
PLATELET # BLD AUTO: 224 10E9/L (ref 150–450)
PLATELET # BLD AUTO: 228 10E9/L (ref 150–450)
POTASSIUM SERPL-SCNC: 3.5 MMOL/L (ref 3.4–5.3)
PROT SERPL-MCNC: 6.5 G/DL (ref 6.8–8.8)
RBC # BLD AUTO: 3.53 10E12/L (ref 4.4–5.9)
RBC # BLD AUTO: 4.07 10E12/L (ref 4.4–5.9)
SODIUM SERPL-SCNC: 138 MMOL/L (ref 133–144)
SPECIMEN SOURCE: NORMAL
WBC # BLD AUTO: 14.2 10E9/L (ref 4–11)
WBC # BLD AUTO: 15.7 10E9/L (ref 4–11)

## 2017-05-08 PROCEDURE — 36415 COLL VENOUS BLD VENIPUNCTURE: CPT | Performed by: INTERNAL MEDICINE

## 2017-05-08 PROCEDURE — 40000141 ZZH STATISTIC PERIPHERAL IV START W/O US GUIDANCE

## 2017-05-08 PROCEDURE — 25000128 H RX IP 250 OP 636: Performed by: INTERNAL MEDICINE

## 2017-05-08 PROCEDURE — 83605 ASSAY OF LACTIC ACID: CPT | Performed by: INTERNAL MEDICINE

## 2017-05-08 PROCEDURE — 85027 COMPLETE CBC AUTOMATED: CPT | Performed by: INTERNAL MEDICINE

## 2017-05-08 PROCEDURE — 25000132 ZZH RX MED GY IP 250 OP 250 PS 637: Performed by: INTERNAL MEDICINE

## 2017-05-08 PROCEDURE — 25000125 ZZHC RX 250: Performed by: NURSE PRACTITIONER

## 2017-05-08 PROCEDURE — 25000125 ZZHC RX 250: Performed by: INTERNAL MEDICINE

## 2017-05-08 PROCEDURE — 85520 HEPARIN ASSAY: CPT | Performed by: INTERNAL MEDICINE

## 2017-05-08 PROCEDURE — 99233 SBSQ HOSP IP/OBS HIGH 50: CPT | Performed by: INTERNAL MEDICINE

## 2017-05-08 PROCEDURE — T1013 SIGN LANG/ORAL INTERPRETER: HCPCS | Mod: U3

## 2017-05-08 PROCEDURE — 99232 SBSQ HOSP IP/OBS MODERATE 35: CPT | Performed by: NURSE PRACTITIONER

## 2017-05-08 PROCEDURE — 40000802 ZZH SITE CHECK

## 2017-05-08 PROCEDURE — 25000128 H RX IP 250 OP 636: Performed by: HOSPITALIST

## 2017-05-08 PROCEDURE — 12000006 ZZH R&B IMCU INTERMEDIATE UMMC

## 2017-05-08 PROCEDURE — 85610 PROTHROMBIN TIME: CPT | Performed by: INTERNAL MEDICINE

## 2017-05-08 PROCEDURE — 80053 COMPREHEN METABOLIC PANEL: CPT | Performed by: INTERNAL MEDICINE

## 2017-05-08 PROCEDURE — 82248 BILIRUBIN DIRECT: CPT | Performed by: INTERNAL MEDICINE

## 2017-05-08 PROCEDURE — 74000 XR ABDOMEN PORT F1 VW: CPT

## 2017-05-08 RX ORDER — HYDRALAZINE HYDROCHLORIDE 20 MG/ML
10 INJECTION INTRAMUSCULAR; INTRAVENOUS EVERY 6 HOURS PRN
Status: DISCONTINUED | OUTPATIENT
Start: 2017-05-08 | End: 2017-05-08

## 2017-05-08 RX ORDER — HYDRALAZINE HYDROCHLORIDE 20 MG/ML
10 INJECTION INTRAMUSCULAR; INTRAVENOUS EVERY 6 HOURS PRN
Status: DISCONTINUED | OUTPATIENT
Start: 2017-05-08 | End: 2017-05-15 | Stop reason: HOSPADM

## 2017-05-08 RX ORDER — ONDANSETRON 4 MG/1
4 TABLET, ORALLY DISINTEGRATING ORAL EVERY 6 HOURS PRN
Status: DISCONTINUED | OUTPATIENT
Start: 2017-05-08 | End: 2017-05-15 | Stop reason: HOSPADM

## 2017-05-08 RX ORDER — HEPARIN SODIUM 10000 [USP'U]/100ML
0-3500 INJECTION, SOLUTION INTRAVENOUS CONTINUOUS
Status: DISCONTINUED | OUTPATIENT
Start: 2017-05-08 | End: 2017-05-09

## 2017-05-08 RX ORDER — HYDROMORPHONE HCL/0.9% NACL/PF 0.2MG/0.2
0.2 SYRINGE (ML) INTRAVENOUS ONCE
Status: COMPLETED | OUTPATIENT
Start: 2017-05-08 | End: 2017-05-08

## 2017-05-08 RX ORDER — ONDANSETRON 2 MG/ML
4 INJECTION INTRAMUSCULAR; INTRAVENOUS EVERY 4 HOURS PRN
Status: DISCONTINUED | OUTPATIENT
Start: 2017-05-08 | End: 2017-05-15 | Stop reason: HOSPADM

## 2017-05-08 RX ADMIN — METOPROLOL SUCCINATE 25 MG: 25 TABLET, EXTENDED RELEASE ORAL at 19:57

## 2017-05-08 RX ADMIN — HYDRALAZINE HYDROCHLORIDE 10 MG: 20 INJECTION INTRAMUSCULAR; INTRAVENOUS at 16:31

## 2017-05-08 RX ADMIN — PANTOPRAZOLE SODIUM 40 MG: 40 TABLET, DELAYED RELEASE ORAL at 09:10

## 2017-05-08 RX ADMIN — LEVETIRACETAM 750 MG: 750 TABLET, FILM COATED ORAL at 09:10

## 2017-05-08 RX ADMIN — SODIUM PHOSPHATE 1 ENEMA: 7; 19 ENEMA RECTAL at 09:08

## 2017-05-08 RX ADMIN — LORATADINE 10 MG: 10 TABLET ORAL at 09:11

## 2017-05-08 RX ADMIN — ACETAMINOPHEN 650 MG: 325 TABLET, FILM COATED ORAL at 15:26

## 2017-05-08 RX ADMIN — ATORVASTATIN CALCIUM 10 MG: 10 TABLET, FILM COATED ORAL at 09:10

## 2017-05-08 RX ADMIN — BISACODYL 10 MG: 10 SUPPOSITORY RECTAL at 15:34

## 2017-05-08 RX ADMIN — HEPARIN SODIUM 1450 UNITS/HR: 10000 INJECTION, SOLUTION INTRAVENOUS at 11:57

## 2017-05-08 RX ADMIN — HYDROMORPHONE HYDROCHLORIDE 0.2 MG: 10 INJECTION, SOLUTION INTRAMUSCULAR; INTRAVENOUS; SUBCUTANEOUS at 06:19

## 2017-05-08 RX ADMIN — LEVETIRACETAM 750 MG: 750 TABLET, FILM COATED ORAL at 19:57

## 2017-05-08 RX ADMIN — PHYTONADIONE 2 MG: 10 INJECTION, EMULSION INTRAMUSCULAR; INTRAVENOUS; SUBCUTANEOUS at 13:37

## 2017-05-08 RX ADMIN — PROCHLORPERAZINE EDISYLATE 5 MG: 5 INJECTION INTRAMUSCULAR; INTRAVENOUS at 09:50

## 2017-05-08 RX ADMIN — PIPERACILLIN SODIUM,TAZOBACTAM SODIUM 3.38 G: 3; .375 INJECTION, POWDER, FOR SOLUTION INTRAVENOUS at 03:56

## 2017-05-08 RX ADMIN — PIPERACILLIN SODIUM,TAZOBACTAM SODIUM 3.38 G: 3; .375 INJECTION, POWDER, FOR SOLUTION INTRAVENOUS at 19:58

## 2017-05-08 RX ADMIN — OXYCODONE HYDROCHLORIDE 5 MG: 5 TABLET ORAL at 17:56

## 2017-05-08 RX ADMIN — OLOPATADINE HYDROCHLORIDE 1 DROP: 1 SOLUTION/ DROPS OPHTHALMIC at 19:56

## 2017-05-08 RX ADMIN — ALLOPURINOL 100 MG: 100 TABLET ORAL at 09:11

## 2017-05-08 RX ADMIN — METOPROLOL SUCCINATE 50 MG: 50 TABLET, EXTENDED RELEASE ORAL at 09:10

## 2017-05-08 RX ADMIN — ONDANSETRON 4 MG: 2 INJECTION INTRAMUSCULAR; INTRAVENOUS at 04:56

## 2017-05-08 RX ADMIN — OLOPATADINE HYDROCHLORIDE 1 DROP: 1 SOLUTION/ DROPS OPHTHALMIC at 09:11

## 2017-05-08 RX ADMIN — LOSARTAN POTASSIUM 25 MG: 25 TABLET, FILM COATED ORAL at 09:10

## 2017-05-08 RX ADMIN — CARBOXYMETHYLCELLULOSE SODIUM 1 DROP: 5 SOLUTION/ DROPS OPHTHALMIC at 09:25

## 2017-05-08 RX ADMIN — OXYCODONE HYDROCHLORIDE 5 MG: 5 TABLET ORAL at 09:50

## 2017-05-08 RX ADMIN — SIMETHICONE CHEW TAB 80 MG 80 MG: 80 TABLET ORAL at 19:57

## 2017-05-08 RX ADMIN — Medication 100 MG: at 09:11

## 2017-05-08 RX ADMIN — PIPERACILLIN SODIUM,TAZOBACTAM SODIUM 3.38 G: 3; .375 INJECTION, POWDER, FOR SOLUTION INTRAVENOUS at 15:26

## 2017-05-08 RX ADMIN — PIPERACILLIN SODIUM,TAZOBACTAM SODIUM 3.38 G: 3; .375 INJECTION, POWDER, FOR SOLUTION INTRAVENOUS at 09:11

## 2017-05-08 RX ADMIN — TAMSULOSIN HYDROCHLORIDE 0.4 MG: 0.4 CAPSULE ORAL at 12:47

## 2017-05-08 RX ADMIN — MELATONIN TAB 3 MG 3 MG: 3 TAB at 22:19

## 2017-05-08 RX ADMIN — ACETAMINOPHEN 650 MG: 325 TABLET, FILM COATED ORAL at 04:26

## 2017-05-08 RX ADMIN — DEXTROSE AND SODIUM CHLORIDE: 5; 900 INJECTION, SOLUTION INTRAVENOUS at 09:51

## 2017-05-08 ASSESSMENT — PAIN DESCRIPTION - DESCRIPTORS: DESCRIPTORS: ACHING;CRAMPING

## 2017-05-08 NOTE — PROGRESS NOTES
[facility], [place]  Internal Medicine Progress Note - Gold Service    Assessment & Plan   Sohan Rendon is a 67 year old male with chronic systolic heart failure 2/2 to ischemic CM s/p HM II LVAD placement in [place] now Destination Therapy due to multiple CVA's with residual left sided weakness c/b recurrent hemolysis and pump thrombus, CKD, who was recently admitted on 2/11-2/15 for appendicitis, managed conservatively; now presenting with right sided abdominal pain that is due to acalculous cholecystitis.       1- Acute acalculous cholecystitis: RUQ US showed gallbladder sludge with nonspecific mild-wall thickening on admission, and his exam was not consistent with cholecystitis. Driveline site looks normal and there is no fluid collection in his CT so suspicion for driveline infection is low. Pulmonary,  Gen surgery was consulted for cholecystitis on admission, but they thought it was unlikely based on imaging and exam and hx. Procal, CRP, WBC rising prompted RUQ US repeat imaging on 5/7--> gb wall doubled in size with new pericholecystic fluid and sludge, and Tbili and ALP are also elevated.   - No NSAIDs  - zofran prn for nausea  - tylenol prn (oxycodone prn if near bedtime)  - bld cx from admission with no growth, repeat cultures also with no growth  - UA pending  - zoyn initiated 5/7  - surg consulted- recommended to see if GI would want to consider transcystic stenting given Tbili/ALP elevation  - gi consulted- considering ERCP and transcystic stenting (would require gen anesthesia- discussing with cards2) vs perc cholecystostomy tube (would be placed by IR). IR could place a drain, but he would have increased risk due to the LVAD (per IR consult on 5/8/2017).    2. Diffuse Abd pain- constipation, with AXR showing no obstruction, + BS  - NPO while nauseated  - enemas until stool ouptut    3. Urinary retention: Hx of BPH, has been retaining. [place] was placed by 5/5  - plan to  f/u with urology 1 wk after d/c to consider truong removal    4. Anticoagulated for LVAD: his INR goal for LVAD is 1.8-2.5 due to hematuria recently.  INR on admission 2.93 (5/2 at 11 pm).  INR 4.19 on 5/4/2017 likely since he is not eating much. Warfarin was held as he was supratherapeutic due to decreased diet and then restarted until his most recent dose on 5/7.  - hold warfarin as will need to do drain or stent  - reverse INR with IV Vit K  - repeat INR check in am  - heparin drip initiated for bridging    5. Duodenitis: on CT on admission, improved on repeat CT, irritation likely caused by acute cholecystitis  - h pylori pending  - cont home PPI daily    Chronic Conditions  Chronic systolic heart failure secondary to ischemic CM/ Stage D, NYHA Class III B/ S/p HM II LVAD placement in [place] now DT due to multiple CVAs with residual left sided weakness complicated by recurrent hemolysis and pump thrombus. Focus of care remains on quality of life.    --LVAD settings: PI 6.1, Speed 8800 rpm, Power 5.4 capps  --BB: Metoprolol 50mg am, 25mg qhs   --ACEI/ ARB: Losartan 25mg daily   --SCD prophylaxis: ICD  --Fluid status: euvolemic.   --INR goal: 1.8-2.3 (reduced in setting of gross hematuria)  --Antiplatelet agents: none  --Atorvastin 10mg PO daily  --on bedside interrogation, there were no recent LVAD alarms and driveline site looked normal w/o discharge  --  --cards 2 consult given LVAD status- appreciate them following, driveline looks good and no evidence of abscess on CT      HTN: currently controlled, no issues with hypotension.   - cont PTA losartan and metoprolol      Multiple CVAs with residual and recurrent hemolysis/thrombus: Current baseline with left hemiparesis. No significant rehab potential. Continue warfarin as above       History of seizure: Continue Levetiracetam 750mg PO BID       Gout: Allopurinol 100mg PO daily       Constipation: Bowel regimen prn       BPH: flomax 0.4mg daily        Iron Deficiency Anemia: Holding iron and multivitamins while inpatient     Diet: Calorie Counts  NPO while he is nauseated- will need to be NPO at [place] for possible surgery on 5/9  Fluids: fluids held as he is beginning to be fluid overloaded- consider restarting later in day or in am of surgery  DVT Prophylaxis: Warfarin now stopped, hep drip for bridging  Code Status: Full Code    Disposition Plan   Expected discharge: 3-5 days, after ERCP or perc cholecystostomy placement     Entered: Selene Frias 05/08/2017, 9:00 AM    The patient's care was discussed with his daughters and bedside nurses    Selene Frias  Internal Medicine Staff Hospitalist Service  [facility]  Pager: [phone]  Please see sticky note for cross cover information    Interval History   Justice ate a little yesterday and a little breakfast today. His daughter gave him her food from home for dinner, and he did not eat anything on his dinner tray.  He has been able to drink when encouraged and given a goal. He continues to have much less pain after eating with tylenol. Oxycodone makes him very sleepy and thus isn't as helpful for pain control.He now has a truong for his urinary retention.  Otherwise, 4 pt ROS neg    Data reviewed today: I reviewed all medications, new labs and imaging results over the last 24 hours.     Physical Exam   Vital Signs: Temp: 98.4  F (36.9  C) Temp src: Oral BP: 126/90 Pulse: 69 Heart Rate: 70 Resp: 18 SpO2: 96 % O2 Device: None (Room air)    Weight: 180 lbs 0 oz  General: sitting up in bed with family by side. Somewhat uncomfortable, but feeling much better after he vomited   HEENT: arcus senilis risa; PERRL. EOM intact. Sclera non-icteric risa. MMM  Resp: Lungs clear to auscultation bilaterally except for mild bibasilar crackles.   CV: Hum of LVAD audible, cap refill < 3 sec  Abdominal: Tender to palpation on right of abd; BS+; mild distention  : truong in place, draining clear yellow  fluid  MSK: No gross abnormalities. Appropriate muscle mass  Skin: No bruising nor rashes in limited exam. No jaundice  Extremities: +1 BLE edema  Neurological: CN's II-XII grossly intact. A&O     Data    ROUTINE IP LABS (Last four results)  BMP    Recent Labs  Lab 05/08/17  0553 05/07/17  0725 05/05/17  1023 05/03/17  0545    139 134 136   POTASSIUM 3.5 3.5 3.5 4.4   CHLORIDE 110* 110* 108 109   JOSÉ 8.4* 8.3* 8.0* 7.9*   CO2 18* 19* 17* 15*   BUN 18 20 16 14   CR 1.63* 1.75* 1.65* 1.45*   * 151* 239* 193*     CBC    Recent Labs  Lab 05/08/17  0553 05/07/17  0725 05/06/17  0731 05/05/17  0621   WBC 14.2* 15.7* 19.0* 15.8*   RBC 3.53* 3.53* 3.64* 3.75*   HGB 10.9* 11.1* 11.5* 11.8*   HCT 33.0* 33.2* 34.6* 35.4*   MCV 94 94 95 94   MCH 30.9 31.4 31.6 31.5   MCHC 33.0 33.4 33.2 33.3   RDW 16.4* 16.6* 16.3* 16.6*    180 195 177     INR    Recent Labs  Lab 05/08/17  0553 05/07/17  0725 05/06/17  0731 05/05/17  0621   INR 1.94* 2.04* 2.65* 3.30*     Liver Function Studies -   Recent Labs   Lab Test  05/07/17   0725   PROTTOTAL  6.4*   ALBUMIN  2.3*   BILITOTAL  1.6*   ALKPHOS  164*   AST  43   ALT  31   Liver Function Studies -   Recent Labs   Lab Test  05/08/17   0553   PROTTOTAL  6.5*   ALBUMIN  2.3*   BILITOTAL  2.0*   ALKPHOS  228*   AST  77*   ALT  38     RUQ US 5/7/2017 IMPRESSION:   Increased gallbladder wall thickening of 7.4 mm, previously 3.5, and  new pericholecystic fluid, with redemonstrated gallbladder sludge.  Differential remains acalculus cholecystitis versus changes secondary  to hepatic dysfunction.

## 2017-05-08 NOTE — CONSULTS
GASTROENTEROLOGY CONSULTATION      Date of Admission:  5/2/2017  Reason for Admission: abd pain  Date of Consult  5/8/2017   Requesting Physician:  Selene Frias MD           ASSESSMENT AND RECOMMENDATIONS:   Assessment:  67 year old male with a history of chronic systolic heart failure s/p LVAD on Destination Therapy who is admitted to the hospital 5/2 for worsening RUQ abdominal pain. Initially, RUQ US showing GB wall thickness of 3.5mm along with duodenitis, surgery consulted did not think was acute cholecystitis. Subsequently had increasing leukocytosis and inflammatory markers prompting repeat imaging which showed resolved duodenitis but repeat RUQ US showing 7mm thicken GB wall. Liver tests also increasing concerning for possible biliary obstruction. The patient does not look cholangitic or septic. Will consider transpapillary GB drainage if possible in the next couple of days and if not possible will consider percutaneous cholecystostomy tube placement with IR.     Recommendations:  To discuss with Dr. Calderon re: transpapillary GB drainage - tomorrow vs Wednesday  Cardiology consultation for perioperative risk stratification  Review images with IR  Ideally would transition coumadin to heparin drip  Continue bowel regimen  Continue NPO status, pain medication  Discussed with primary team, Dr. Frias    Gastroenterology follow up recommendations: TBD    Thank you for involving us in this patient's care. Please do not hesitate to contact the GI service with any questions or concerns.     Pt seen and care plan discussed with Dr. Jenkins, Dr. Calderon, GI staff physician.    Violeta Fleming PA-C  Advanced Endoscopy/Pancreaticobiliary PATRICIA  [facility]  Pager *[phone]  -------------------------------------------------------------------------------------------------------------------       Reason for Consultation:   ?ERCP with transcystic stenting to treat  "cholecystitis\"           History of Present Illness:   Patient seen and examined at 1000. History is obtained from the patient through [place]  (therefore somewhat limited). Two daughters present along with Dr. Frias and patient's RN.    Sohan Rendon is a 67 year old male with a PMH significant for chronic systolic heart failure s/p LVAD on Destination Therapy who is admitted to the hospital 5/2 for worsening RUQ abdominal pain. The patient developed acute abdominal pain after eating out at a restaurant 4-5 days prior to admission. The pain is located in the RUQ - radiating to the middle abdomen. He has had a lot of nausea and vomiting. Pain is worse with taking deep breaths. Initially, US was obtained on 5/3 showing GB wall of 3.5mm with gb sludge present. No cholelithiasis was noted. CT scan performed the same day showed fat stranding around second portion of duodenum. Surgery was consulted who recommended no ccy given multiple comorbidities, did not think presentation was consistent with cholecystitis. At that time his LFT were normal. Two days ago his WBC increased to 19, also CRP increased to 190 so repeat imaging was performed - CT abd showing improvement of duodenitis but RUQ showing worsening of GB wall thickening, now up to 7mm thick. Additionally, the patient's LFT started to increase yesterday including Tbili 1.6->2, Alk phos 164->228, AST/ALT WNL. He was started on Zosyn. He remains afebrile.    Today, he reports that he has continuous RUQ abdominal pain - has been vomiting all morning. His daughters think that he is worse today. He has remained afebrile (Tmax 99). Additionally, the patient has been very constipated, having mild relief with enemas.            Past Medical History:   Reviewed and edited as appropriate  Past Medical History:   Diagnosis Date     Acute right MCA stroke (H) 6/2013    With L sided hemiparesis      Anemia of chronic disease     Baseline Hb 8-9     BPH (benign " prostatic hyperplasia)      CHF (congestive heart failure), NYHA class IV (H) 10/9/2012    s/p HeartMate II.  Was on milrinone and dobutamine prior to LVAD      CKD (chronic kidney disease)     Baseline Cr=     Clostridium difficile colitis 12/2012      Dysphagia     PEG tube placed in 2012.  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  Normal carotid duplex 3/2014.       Esophagitis 12/2012    EGD with esophagitis and multiple douenal ulcers     Fracture of femoral neck, right, closed 2/3/2015    Presumed pathologic as patient is non-weight-bearing and suffered no trauma.  Family declined operative repair during hospital stay 1/23 - 1/30/15.     Gastric and duodenal angiodysplasia with hemorrhage 6/18/2015     GERD (gastroesophageal reflux disease)      Gout      HTN (hypertension)     LDL=59 (3/29/2014)     Hyperlipaemia      Ischemic cardiomyopathy      Mitral regurgitation     s/p MVR with Carbomedics ring      Myocardial infarction (H) 1998    In [facility] without intervention      Nonsenile cataract     BE     PFO (patent foramen ovale)     s/p closure (10/9/2012)     TB lung, latent     s/p 9 months INH in 2012             Past Surgical History:   Reviewed and edited as appropriate   Past Surgical History:   Procedure Laterality Date     C CABG, VEIN, FIVE  2001     CATARACT IOL, RT/LT Right 11/19/2015     ESOPHAGOSCOPY, GASTROSCOPY, DUODENOSCOPY (EGD), COMBINED N/A 6/10/2015    Procedure: COMBINED ESOPHAGOSCOPY, GASTROSCOPY, DUODENOSCOPY (EGD);  Surgeon: Edu Jenkins MD;  Location: [facility] GI     ESOPHAGOSCOPY, GASTROSCOPY, DUODENOSCOPY (EGD), COMBINED N/A 6/15/2015    Procedure: COMBINED ESOPHAGOSCOPY, GASTROSCOPY, DUODENOSCOPY (EGD);  Surgeon: Yury Bonner MD;  Location: [facility] OR     H INSERT ICD  2/10/2011    And pacemaker.  Not BiV     INSERT VENTRICULAR ASSIST DEVICE LEFT (HEARTMATE II)  10/9/2012     IR  GASTRO JEJUNOSTOMY TUBE PLACEMENT       PHACOEMULSIFICATION CLEAR CORNEA WITH STANDARD INTRAOCULAR LENS IMPLANT Right 11/19/2015    Procedure: PHACOEMULSIFICATION CLEAR CORNEA WITH STANDARD INTRAOCULAR LENS IMPLANT;  Surgeon: Violeta Cosme MD;  Location: [facility] OR     REPAIR PATENT FORAMEN OVALE  10/9/2012     REPAIR VALVE MITRAL  2/9/2012    28 mm Carbomedics 2/3 ring               Social History:     Social History     Social History     Marital status:      Spouse name: N/A     Number of children: N/A     Years of education: N/A     Occupational History     Not on file.     Social History Main Topics     Smoking status: Former Smoker     Smokeless tobacco: Former User     Alcohol use No     Drug use: No     Sexual activity: Not on file     Other Topics Concern     Not on file     Social History Narrative                Family History:   Reviewed and edited as appropriate  Family History   Problem Relation Age of Onset     Family History Negative No family hx of      Glaucoma No family hx of      Macular Degeneration No family hx of      CANCER No family hx of      no skin cancer             Allergies:   Reviewed and edited as appropriate     Allergies   Allergen Reactions     Seasonal Allergies             Medications:     Current Facility-Administered Medications   Medication     HOLD MEDICATION     warfarin-No DOSE today     ondansetron (ZOFRAN) injection 4 mg    Or     ondansetron (ZOFRAN-ODT) ODT tab 4 mg     heparin  drip 25,000 units in 0.45% NaCl 250 mL (see additional administration details for dose)     heparin bolus from infusion pump     phytonadione (AQUA-MEPHYTON) 2 mg in NaCl 0.9 % 50 mL intermittent infusion     carboxymethylcellulose (REFRESH PLUS) 0.5 % ophthalmic solution 1 drop     piperacillin-tazobactam (ZOSYN) 3.375 g vial to attach to  mL bag     pantoprazole (PROTONIX) EC tablet 40 mg     olopatadine (PATANOL) 0.1 % ophthalmic solution 1 drop     bisacodyl (DULCOLAX)  Suppository 10 mg     lidocaine 2 % (URO-JET) jelly 10 mL     acetaminophen (TYLENOL) tablet 650 mg     allopurinol (ZYLOPRIM) tablet 100 mg     atorvastatin (LIPITOR) tablet 10 mg     bisacodyl (DULCOLAX) EC tablet 5 mg     levETIRAcetam (KEPPRA) tablet 750 mg     loratadine (CLARITIN) tablet 10 mg     losartan (COZAAR) tablet 25 mg     melatonin tablet 3 mg     simethicone (MYLICON) chewable tablet 80 mg     tamsulosin (FLOMAX) capsule 0.4 mg     thiamine tablet 100 mg     naloxone (NARCAN) injection 0.1-0.4 mg     lidocaine 1 % 1 mL     lidocaine (LMX4) kit     sodium chloride (PF) 0.9% PF flush 3 mL     sodium chloride (PF) 0.9% PF flush 3 mL     Patient is already receiving anticoagulation with heparin, enoxaparin (LOVENOX), warfarin (COUMADIN)  or other anticoagulant medication     senna-docusate (SENOKOT-S;PERICOLACE) 8.6-50 MG per tablet 1-2 tablet     Warfarin Therapy Reminder (Check START DATE - warfarin may be starting in the FUTURE)     metoprolol (TOPROL-XL) 24 hr tablet 50 mg     metoprolol (TOPROL-XL) 24 hr tablet 25 mg     oxyCODONE (ROXICODONE) IR tablet 5 mg     Facility-Administered Medications Ordered in Other Encounters   Medication     oxyCODONE (ROXICODONE) 5 MG immediate release tablet             Review of Systems:   A complete review of systems was performed and is negative except as noted in the HPI             Physical Exam:   Temp: 98.4  F (36.9  C) Temp src: Oral BP: 126/90 Pulse: 69 Heart Rate: 70 Resp: 18 SpO2: 96 % O2 Device: None (Room air)    Wt:   Wt Readings from Last 2 Encounters:   05/02/17 81.6 kg (180 lb)   05/02/17 81.6 kg (180 lb)        General: Chronically ill appearing male in NAD.  Answers appropriately.    HEENT: Head is AT/NC. Sclera anicteric. No conjunctival injection.  Oropharynx is clear, moist and w/o exudate or lesions.  Lungs: Clear to auscultation bilaterally.  No wheezes, rhonchi or crackles.    Heart: LVAD audible  Abdomen:  Soft, mild tenderness ruq,  moderate distension,  BS hypoactive No rebound or peritoneal signs  Extremities: Trace LE edema.  Skin: No jaundice, rash  Neurologic: Grossly non-focal.  CN 2-12 grossly intact.            Data:   Labs and imaging below were independently reviewed and interpreted    LAB WORK:    BMP  Recent Labs  Lab 05/08/17  0553 05/07/17  0725 05/05/17  1023 05/03/17  0545    139 134 136   POTASSIUM 3.5 3.5 3.5 4.4   CHLORIDE 110* 110* 108 109   JOSÉ 8.4* 8.3* 8.0* 7.9*   CO2 18* 19* 17* 15*   BUN 18 20 16 14   CR 1.63* 1.75* 1.65* 1.45*   * 151* 239* 193*     CBC  Recent Labs  Lab 05/08/17  0553 05/07/17  0725 05/06/17  0731 05/05/17  0621   WBC 14.2* 15.7* 19.0* 15.8*   RBC 3.53* 3.53* 3.64* 3.75*   HGB 10.9* 11.1* 11.5* 11.8*   HCT 33.0* 33.2* 34.6* 35.4*   MCV 94 94 95 94   MCH 30.9 31.4 31.6 31.5   MCHC 33.0 33.4 33.2 33.3   RDW 16.4* 16.6* 16.3* 16.6*    180 195 177     INR  Recent Labs  Lab 05/08/17  0553 05/07/17  0725 05/06/17  0731 05/05/17  0621   INR 1.94* 2.04* 2.65* 3.30*     LFTs  Recent Labs  Lab 05/08/17  0553 05/07/17  0725 05/02/17 2247 05/02/17  0442   ALKPHOS 228* 164* 105 105   AST 77* 43 27 33   ALT 38 31 10 10   BILITOTAL 2.0* 1.6* 0.9 0.7   PROTTOTAL 6.5* 6.4* 7.4 7.6   ALBUMIN 2.3* 2.3* 3.2* 3.3*      PANC  Recent Labs  Lab 05/03/17  0350 05/02/17 2247 05/02/17  0442   LIPASE 47* 53* 78   AMYLASE 34  --   --        IMAGING:  EXAMINATION: US ABDOMEN LIMITED, 5/7/2017 11:58 AM          IMPRESSION:   Increased gallbladder wall thickening of 7.4 mm, previously 3.5, and  new pericholecystic fluid, with redemonstrated gallbladder sludge.  Differential remains acalculus cholecystitis versus changes secondary  to hepatic dysfunction.       EXAMINATION: CT CHEST ABDOMEN PELVIS W/O CONTRAST 5/6/2017 4:11 PM      IMPRESSION:  1. Although limited by lack of intravenous contrast, no definitive CT  evidence of infection associated with the left ventricular assist  device.  2. No significant  change in opacities of the lower lobes, right  greater the left, which may represent atelectasis with possible  superimposed pneumonia.  3. Persistent thickening of the rectum, which may be due to stool  burden and or proctitis.   5. Moderate cardiomegaly. Mild dilatation of the main pulmonary  artery, measuring up to 3.4 cm in diameter. The latter finding may  represent some degree of underlying pulmonary arterial hypertension.  6. Near-complete resolution of previously demonstrated fat stranding  adjacent to the second portion of the duodenum.  7. Significant distention of the gallbladder, which may be due to  fasting state. No radiopaque gallstones noted. No adjacent fat  stranding or CT findings to suggest acute cholecystitis.  8. Hypoattenuating right thyroid nodule, which occupies the majority  of the gland. 9. Groundglass pulmonary nodule within the right middle  lobe, which may be infectious. No significant change from prior study  5/3/2017.           =======================================================================

## 2017-05-08 NOTE — PHARMACY-ANTICOAGULATION SERVICE
Warfarin Therapy Hold Note  This patient is currently receiving warfarin for LVAD.    Goal INR:  1.8-2.5 as outpatient, current goal 1.8-2.3 for gross hematuria.    Anticoagulation Dose History     Recent Dosing and Labs Latest Ref Rng & Units 5/2/2017 5/3/2017 5/4/2017 5/5/2017 5/6/2017 5/7/2017 5/8/2017    Warfarin 1 mg - - - - - 1 mg - -    Warfarin 1.5 mg - - 1.5 mg - - - - -    Warfarin 2 mg - - - - - - 2 mg -    INR 0.86 - 1.14 2.93(H) - 4.19(H) 3.30(H) 2.65(H) 2.04(H) 1.94(H)    INR 0.86 - 1.14 - - - - - - -    INR Point of Care 0.86 - 1.14 - - - - - - -            Bleeding Signs/Symptoms:  None    Assessment:  Current INR is therapeutic, however patient has planned procedure tomorrow 5/9, so Pharmacy was consulted to dose vitamin K for reversal of INR for safe intervention.    Plan:  1) HOLD today s warfarin dose.   An order has been placed in EPIC for  Warfarin- No Dose Today    2) Per [facility] protocol, will give vitamin K 2 mg IV x1 today.  3) Recheck next INR tomorrow with AM labs.    The primary team is aware of the plan as outlined above.    Sukhdev Quiñones, LatoyaD

## 2017-05-08 NOTE — PROGRESS NOTES
Pt was sleeping at the time of check in. Pt's family did not have any VAD related questions/concerns.

## 2017-05-08 NOTE — CONSULTS
[facility]   Cardiology II Consult/ Advanced Heart Failure  Daily Progress Note  Date of Service: 5/8/2017      Patient: Sohan Rendon  MRN: [id number]  Admission Date: 5/2/2017  Hospital Day # 4    Assessment and Plan: Mr. 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 He presents to ED for abdominal pain secondary to duodenitis. Cardiology consult requested per IM for LVAD management and preoperative clearance.     ICM s/p LVAD HM II. ICD 2011. 2012 as DT. 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   Stage D, NYHA Class III. Hypertensive today with MAPs in the 80s-100s range; may be related to abdominal pain and vomiting today. Recommend IV Hydralazine PRN for MAP > 90 mmHg, may need to increase Losartan or Toprol dose if hypertension persists with resolution of his abdominal pain.  ACEi/ARB: Losartan 25 mg po daily.   BB Toprol XL 50 mg in AM and 25 mg at hs.   Aldosterone antagonist contraindicated due to renal dysfunction.   SCD prophylaxis ICD  Fluid status Euvolemic.   MAP: 80s-100s. PRN Hydralazine as above for MAP > 90 mmHg.  LDH: 548 on 5/6, baseline in the 700s-800s.   Anticoagulation: Primary team is holding warfarin today for GI procedure likely to occur in the next 1-2 days. INR-1.94 today, goal 1.8-2.5. Vitamin K was given this morning and he is being bridged with a heparin drip. Would minimize time on heparin drip given significant history of hematuria; could consider FFP pre-procedure if INR remains  "high.  Antiplatelet: ASA remains on hold given recurrent hemolysis.   Low suspicion for driveline infection given clinical exam findings negative for site erythematous, drainage, or induration per RN evaluation. CT Chest/Abd/Pelvis negative for fluid collection surrounding LVAD.     In terms of his risk for anesthesia, we do not feel that his cardiac status is a contraindication for him undergoing general anesthesia. He has been very stable on his LVAD.    Cardiology will sign off at this time. Please feel free to contact us with questions at any time or if need for repeat consultation is indicated at [facility] [id number].  ================================================================    Interval History/ROS: Mr. Rendon is having abdominal pain and has been vomiting this morning. He is hopeful that they can fix it tomorrow with a procedure. Daughters are concerned about swelling in his feet after getting IV fluids. Denies any dizziness, SOB, chest pain, or palpitations.    Last 24 hr care team notes reviewed.   ROS:  4 point ROS including Respiratory, CV, GI and , other than that noted in the HPI, is negative.     Medications: Reviewed in EPIC.     Physical Exam:   BP (!) 141/97 (BP Location: Right arm)  Pulse 92  Temp 97.9  F (36.6  C) (Oral)  Resp 24  Ht 1.727 m (5' 8\")  Wt 81.6 kg (180 lb)  SpO2 100%  BMI 27.37 kg/m2  GENERAL: Appears alert and oriented times three.   HEENT: Eye symmetrical and free of discharge bilaterally. Mucous membranes moist and without lesions.  NECK: Supple and without lymphadenopathy. JVD absent.   CV: S1S2 present with LVAD hum.   RESPIRATORY: Respirations regular, even, and unlabored. Lungs CTA throughout.   GI: Soft and mildly distended with normoactive bowel sounds present in all quadrants.   EXTREMITIES: Trace bilateral foot edema. 2+ bilateral pedal pulses.   NEUROLOGIC: Alert and orientated x 3. CN II-XII grossly intact. No focal deficits.   MUSCULOSKELETAL: No joint swelling or " tenderness.   SKIN: No jaundice. No rashes or lesions.     Data:  CMP    Recent Labs  Lab 05/08/17  0553 05/07/17  0725 05/05/17  1023 05/03/17  0545 05/02/17  2247 05/02/17  0442    139 134 136 134 138   POTASSIUM 3.5 3.5 3.5 4.4 3.8 3.8   CHLORIDE 110* 110* 108 109 105 106   CO2 18* 19* 17* 15* 20 23   ANIONGAP 10 10 9 12 9 9   * 151* 239* 193* 208* 188*   BUN 18 20 16 14 15 16   CR 1.63* 1.75* 1.65* 1.45* 1.57* 1.78*   GFRESTIMATED 43* 39* 42* 49* 44* 38*   GFRESTBLACK 51* 47* 51* 59* 54* 46*   JOSÉ 8.4* 8.3* 8.0* 7.9* 8.5 8.6   MAG  --   --  1.8  --   --   --    PROTTOTAL 6.5* 6.4*  --   --  7.4 7.6   ALBUMIN 2.3* 2.3*  --   --  3.2* 3.3*   BILITOTAL 2.0* 1.6*  --   --  0.9 0.7   ALKPHOS 228* 164*  --   --  105 105   AST 77* 43  --   --  27 33   ALT 38 31  --   --  10 10     CBC    Recent Labs  Lab 05/08/17  1102 05/08/17  0553 05/07/17  0725 05/06/17  0731   WBC 15.7* 14.2* 15.7* 19.0*   RBC 4.07* 3.53* 3.53* 3.64*   HGB 12.7* 10.9* 11.1* 11.5*   HCT 37.4* 33.0* 33.2* 34.6*   MCV 92 94 94 95   MCH 31.2 30.9 31.4 31.6   MCHC 34.0 33.0 33.4 33.2   RDW 16.4* 16.4* 16.6* 16.3*    228 180 195     INR    Recent Labs  Lab 05/08/17  0553 05/07/17  0725 05/06/17  0731 05/05/17  0621   INR 1.94* 2.04* 2.65* 3.30*       Patient discussed with Dr. Butler.     Nathalie Whitaker, APRN CNP  [phone]

## 2017-05-08 NOTE — PLAN OF CARE
Problem: Goal Outcome Summary  Goal: Goal Outcome Summary  Outcome: No Change  UOP and VS are good.Abdominal pain unchanged. Tylenol and oxycodone ineffective. Tried one time dose of dilaudid IV. No change in pain and an increase in nausea. No confusion noted by family. Anticipate drain placement in near future.

## 2017-05-08 NOTE — PROGRESS NOTES
"Surgery Note  5/8/2017    Patient reports that his tenderness is much improved today. He notes that he has had pain since the last ten days.   His daughter however is concerned regarding new abdominal distension today and reports that he was up in the middle of the night dealing with significant abdominal pain and discomfort.     BP (!) 143/105  Pulse 69  Temp 97.6  F (36.4  C) (Oral)  Resp 24  Ht 1.727 m (5' 8\")  Wt 81.6 kg (180 lb)  SpO2 100%  BMI 27.37 kg/m2  nad A&Ox3  nonlabored breathing  Abdomen soft, ttp over the RUQ but no guarding or firmness.     Labs reviewed  Wbc 15.7 from 14.2    T bili 2.0, up from 1.6  Direct bili 1.5    Impression:   67yM with hx of LVAD and systolic HF who currently has signs of acute cholecystitis complicated by at least partial biliary obstruction. Even though he notes clinical imporvement, the history is inconsistent with his daughter's observations and he has an increasing leukocytosis.     Recs:   -Agree with evaluation by GI for possible transcystic duct stent for drainage of the GB.   -Alternatively, could be a candidate for percutaneous cholecystostomy tube, however, internal drainage would e a better option.   -Surgery will continue to follow peripherally, but please do not hesitate to call with questions.      Seen w Faustina Adamson PGY 5        "

## 2017-05-08 NOTE — PROGRESS NOTES
Calorie Counts  Intake recorded for: 5/7 Kcals: 677  Protein: 36g  # Meals Recorded: 2 meals (First - 100% fruit cup, [place] toast with butter and syrup, 50% scrambled eggs, oatmeal, 25% hot black tea)      (Second - 50% brown rice, black beans, roast turkey with gravy, carrots)  # Supplements Recorded: 0

## 2017-05-09 ENCOUNTER — OFFICE VISIT (OUTPATIENT)
Dept: INTERPRETER SERVICES | Facility: CLINIC | Age: 67
End: 2017-05-09

## 2017-05-09 ENCOUNTER — ANESTHESIA EVENT (OUTPATIENT)
Dept: SURGERY | Facility: CLINIC | Age: 67
DRG: 408 | End: 2017-05-09
Payer: COMMERCIAL

## 2017-05-09 ENCOUNTER — APPOINTMENT (OUTPATIENT)
Dept: GENERAL RADIOLOGY | Facility: CLINIC | Age: 67
DRG: 408 | End: 2017-05-09
Attending: INTERNAL MEDICINE
Payer: COMMERCIAL

## 2017-05-09 ENCOUNTER — ANESTHESIA (OUTPATIENT)
Dept: SURGERY | Facility: CLINIC | Age: 67
DRG: 408 | End: 2017-05-09
Payer: COMMERCIAL

## 2017-05-09 LAB
ALBUMIN SERPL-MCNC: 2 G/DL (ref 3.4–5)
ALP SERPL-CCNC: 360 U/L (ref 40–150)
ALT SERPL W P-5'-P-CCNC: 110 U/L (ref 0–70)
ANION GAP SERPL CALCULATED.3IONS-SCNC: 10 MMOL/L (ref 3–14)
AST SERPL W P-5'-P-CCNC: 224 U/L (ref 0–45)
BACTERIA SPEC CULT: NO GROWTH
BACTERIA SPEC CULT: NO GROWTH
BILIRUB SERPL-MCNC: 5 MG/DL (ref 0.2–1.3)
BLD PROD TYP BPU: NORMAL
BLD PROD TYP BPU: NORMAL
BLD UNIT ID BPU: 0
BLOOD PRODUCT CODE: NORMAL
BPU ID: NORMAL
BUN SERPL-MCNC: 15 MG/DL (ref 7–30)
CALCIUM SERPL-MCNC: 8.2 MG/DL (ref 8.5–10.1)
CHLORIDE SERPL-SCNC: 109 MMOL/L (ref 94–109)
CO2 SERPL-SCNC: 18 MMOL/L (ref 20–32)
CREAT SERPL-MCNC: 1.55 MG/DL (ref 0.66–1.25)
ERYTHROCYTE [DISTWIDTH] IN BLOOD BY AUTOMATED COUNT: 16.5 % (ref 10–15)
GFR SERPL CREATININE-BSD FRML MDRD: 45 ML/MIN/1.7M2
GLUCOSE SERPL-MCNC: 192 MG/DL (ref 70–99)
HCT VFR BLD AUTO: 32.3 % (ref 40–53)
HGB BLD-MCNC: 11 G/DL (ref 13.3–17.7)
INR PPP: 1.69 (ref 0.86–1.14)
LMWH PPP CHRO-ACNC: 1.12 IU/ML
LMWH PPP CHRO-ACNC: NORMAL IU/ML
MCH RBC QN AUTO: 31.3 PG (ref 26.5–33)
MCHC RBC AUTO-ENTMCNC: 34.1 G/DL (ref 31.5–36.5)
MCV RBC AUTO: 92 FL (ref 78–100)
MICRO REPORT STATUS: NORMAL
MICRO REPORT STATUS: NORMAL
NUM BPU REQUESTED: 1
PLATELET # BLD AUTO: 230 10E9/L (ref 150–450)
POTASSIUM SERPL-SCNC: 3.4 MMOL/L (ref 3.4–5.3)
PROT SERPL-MCNC: 6 G/DL (ref 6.8–8.8)
RBC # BLD AUTO: 3.52 10E12/L (ref 4.4–5.9)
SODIUM SERPL-SCNC: 138 MMOL/L (ref 133–144)
SPECIMEN SOURCE: NORMAL
SPECIMEN SOURCE: NORMAL
TRANSFUSION STATUS PATIENT QL: NORMAL
TRANSFUSION STATUS PATIENT QL: NORMAL
WBC # BLD AUTO: 14.2 10E9/L (ref 4–11)

## 2017-05-09 PROCEDURE — 0F988ZZ DRAINAGE OF CYSTIC DUCT, VIA NATURAL OR ARTIFICIAL OPENING ENDOSCOPIC: ICD-10-PCS | Performed by: INTERNAL MEDICINE

## 2017-05-09 PROCEDURE — 36415 COLL VENOUS BLD VENIPUNCTURE: CPT | Performed by: INTERNAL MEDICINE

## 2017-05-09 PROCEDURE — 25000565 ZZH ISOFLURANE, EA 15 MIN: Performed by: INTERNAL MEDICINE

## 2017-05-09 PROCEDURE — 80053 COMPREHEN METABOLIC PANEL: CPT | Performed by: INTERNAL MEDICINE

## 2017-05-09 PROCEDURE — C1877 STENT, NON-COAT/COV W/O DEL: HCPCS | Performed by: INTERNAL MEDICINE

## 2017-05-09 PROCEDURE — 25000132 ZZH RX MED GY IP 250 OP 250 PS 637: Performed by: INTERNAL MEDICINE

## 2017-05-09 PROCEDURE — 25000128 H RX IP 250 OP 636: Performed by: NURSE PRACTITIONER

## 2017-05-09 PROCEDURE — 36000061 ZZH SURGERY LEVEL 3 W FLUORO 1ST 30 MIN - UMMC: Performed by: INTERNAL MEDICINE

## 2017-05-09 PROCEDURE — 40000014 ZZH STATISTIC ARTERIAL MONITORING DAILY

## 2017-05-09 PROCEDURE — 85027 COMPLETE CBC AUTOMATED: CPT | Performed by: INTERNAL MEDICINE

## 2017-05-09 PROCEDURE — 40000344 ZZHCL STATISTIC THAWING COMPONENT: Performed by: PHYSICIAN ASSISTANT

## 2017-05-09 PROCEDURE — 40000170 ZZH STATISTIC PRE-PROCEDURE ASSESSMENT II: Performed by: INTERNAL MEDICINE

## 2017-05-09 PROCEDURE — 25000125 ZZHC RX 250: Performed by: INTERNAL MEDICINE

## 2017-05-09 PROCEDURE — 37000009 ZZH ANESTHESIA TECHNICAL FEE, EACH ADDTL 15 MIN: Performed by: INTERNAL MEDICINE

## 2017-05-09 PROCEDURE — 36415 COLL VENOUS BLD VENIPUNCTURE: CPT | Performed by: ANESTHESIOLOGY

## 2017-05-09 PROCEDURE — 25000125 ZZHC RX 250: Performed by: NURSE PRACTITIONER

## 2017-05-09 PROCEDURE — 25000132 ZZH RX MED GY IP 250 OP 250 PS 637: Performed by: PHYSICIAN ASSISTANT

## 2017-05-09 PROCEDURE — 27210794 ZZH OR GENERAL SUPPLY STERILE: Performed by: INTERNAL MEDICINE

## 2017-05-09 PROCEDURE — 85610 PROTHROMBIN TIME: CPT | Performed by: INTERNAL MEDICINE

## 2017-05-09 PROCEDURE — 25500064 ZZH RX 255 OP 636: Performed by: INTERNAL MEDICINE

## 2017-05-09 PROCEDURE — 71000014 ZZH RECOVERY PHASE 1 LEVEL 2 FIRST HR: Performed by: INTERNAL MEDICINE

## 2017-05-09 PROCEDURE — 25000128 H RX IP 250 OP 636: Performed by: INTERNAL MEDICINE

## 2017-05-09 PROCEDURE — 99233 SBSQ HOSP IP/OBS HIGH 50: CPT | Performed by: INTERNAL MEDICINE

## 2017-05-09 PROCEDURE — 25000128 H RX IP 250 OP 636: Performed by: ANESTHESIOLOGY

## 2017-05-09 PROCEDURE — 40000279 XR SURGERY CARM FLUORO GREATER THAN 5 MIN W STILLS: Mod: TC

## 2017-05-09 PROCEDURE — 85520 HEPARIN ASSAY: CPT | Performed by: INTERNAL MEDICINE

## 2017-05-09 PROCEDURE — 40000275 ZZH STATISTIC RCP TIME EA 10 MIN

## 2017-05-09 PROCEDURE — 37000008 ZZH ANESTHESIA TECHNICAL FEE, 1ST 30 MIN: Performed by: INTERNAL MEDICINE

## 2017-05-09 PROCEDURE — C1726 CATH, BAL DIL, NON-VASCULAR: HCPCS | Performed by: INTERNAL MEDICINE

## 2017-05-09 PROCEDURE — 12000006 ZZH R&B IMCU INTERMEDIATE UMMC

## 2017-05-09 PROCEDURE — 25000125 ZZHC RX 250: Performed by: ANESTHESIOLOGY

## 2017-05-09 PROCEDURE — 85520 HEPARIN ASSAY: CPT | Performed by: ANESTHESIOLOGY

## 2017-05-09 PROCEDURE — C1769 GUIDE WIRE: HCPCS | Performed by: INTERNAL MEDICINE

## 2017-05-09 PROCEDURE — T1013 SIGN LANG/ORAL INTERPRETER: HCPCS | Mod: U3

## 2017-05-09 PROCEDURE — 0F9980Z DRAINAGE OF COMMON BILE DUCT WITH DRAINAGE DEVICE, VIA NATURAL OR ARTIFICIAL OPENING ENDOSCOPIC: ICD-10-PCS | Performed by: INTERNAL MEDICINE

## 2017-05-09 PROCEDURE — 36000059 ZZH SURGERY LEVEL 3 EA 15 ADDTL MIN UMMC: Performed by: INTERNAL MEDICINE

## 2017-05-09 PROCEDURE — P9059 PLASMA, FRZ BETWEEN 8-24HOUR: HCPCS | Performed by: PHYSICIAN ASSISTANT

## 2017-05-09 PROCEDURE — 99207 ZZC APP CREDIT; MD BILLING SHARED VISIT: CPT | Performed by: PHYSICIAN ASSISTANT

## 2017-05-09 DEVICE — STENT ZIMMON BILIARY 07FRX12CM DBL PIGTAIL: Type: IMPLANTABLE DEVICE | Site: BILE DUCT | Status: FUNCTIONAL

## 2017-05-09 RX ORDER — ONDANSETRON 2 MG/ML
4 INJECTION INTRAMUSCULAR; INTRAVENOUS EVERY 30 MIN PRN
Status: DISCONTINUED | OUTPATIENT
Start: 2017-05-09 | End: 2017-05-09 | Stop reason: HOSPADM

## 2017-05-09 RX ORDER — ESMOLOL HYDROCHLORIDE 10 MG/ML
INJECTION INTRAVENOUS PRN
Status: DISCONTINUED | OUTPATIENT
Start: 2017-05-09 | End: 2017-05-09

## 2017-05-09 RX ORDER — ONDANSETRON 4 MG/1
4 TABLET, ORALLY DISINTEGRATING ORAL EVERY 30 MIN PRN
Status: DISCONTINUED | OUTPATIENT
Start: 2017-05-09 | End: 2017-05-09 | Stop reason: HOSPADM

## 2017-05-09 RX ORDER — LABETALOL HYDROCHLORIDE 5 MG/ML
10 INJECTION, SOLUTION INTRAVENOUS
Status: DISCONTINUED | OUTPATIENT
Start: 2017-05-09 | End: 2017-05-09 | Stop reason: HOSPADM

## 2017-05-09 RX ORDER — LIDOCAINE 40 MG/G
CREAM TOPICAL
Status: DISCONTINUED | OUTPATIENT
Start: 2017-05-09 | End: 2017-05-15 | Stop reason: HOSPADM

## 2017-05-09 RX ORDER — ONDANSETRON 2 MG/ML
INJECTION INTRAMUSCULAR; INTRAVENOUS PRN
Status: DISCONTINUED | OUTPATIENT
Start: 2017-05-09 | End: 2017-05-09

## 2017-05-09 RX ORDER — LIDOCAINE HYDROCHLORIDE 20 MG/ML
INJECTION, SOLUTION INFILTRATION; PERINEURAL PRN
Status: DISCONTINUED | OUTPATIENT
Start: 2017-05-09 | End: 2017-05-09

## 2017-05-09 RX ORDER — TAMSULOSIN HYDROCHLORIDE 0.4 MG/1
0.4 CAPSULE ORAL
Status: DISCONTINUED | OUTPATIENT
Start: 2017-05-09 | End: 2017-05-12

## 2017-05-09 RX ORDER — FENTANYL CITRATE 50 UG/ML
25-50 INJECTION, SOLUTION INTRAMUSCULAR; INTRAVENOUS
Status: DISCONTINUED | OUTPATIENT
Start: 2017-05-09 | End: 2017-05-09 | Stop reason: HOSPADM

## 2017-05-09 RX ORDER — ETOMIDATE 2 MG/ML
INJECTION INTRAVENOUS PRN
Status: DISCONTINUED | OUTPATIENT
Start: 2017-05-09 | End: 2017-05-09

## 2017-05-09 RX ORDER — AMOXICILLIN 250 MG
1 CAPSULE ORAL 2 TIMES DAILY
Status: DISCONTINUED | OUTPATIENT
Start: 2017-05-09 | End: 2017-05-10

## 2017-05-09 RX ORDER — PIPERACILLIN SODIUM, TAZOBACTAM SODIUM 3; .375 G/15ML; G/15ML
3.38 INJECTION, POWDER, LYOPHILIZED, FOR SOLUTION INTRAVENOUS ONCE
Status: COMPLETED | OUTPATIENT
Start: 2017-05-09 | End: 2017-05-09

## 2017-05-09 RX ORDER — SODIUM CHLORIDE, SODIUM LACTATE, POTASSIUM CHLORIDE, CALCIUM CHLORIDE 600; 310; 30; 20 MG/100ML; MG/100ML; MG/100ML; MG/100ML
INJECTION, SOLUTION INTRAVENOUS CONTINUOUS
Status: DISCONTINUED | OUTPATIENT
Start: 2017-05-09 | End: 2017-05-09 | Stop reason: HOSPADM

## 2017-05-09 RX ORDER — IOPAMIDOL 408 MG/ML
INJECTION, SOLUTION INTRAVASCULAR PRN
Status: DISCONTINUED | OUTPATIENT
Start: 2017-05-09 | End: 2017-05-09 | Stop reason: HOSPADM

## 2017-05-09 RX ORDER — INDOMETHACIN 50 MG/1
100 SUPPOSITORY RECTAL
Status: DISCONTINUED | OUTPATIENT
Start: 2017-05-09 | End: 2017-05-10

## 2017-05-09 RX ORDER — HEPARIN SODIUM 10000 [USP'U]/100ML
0-3500 INJECTION, SOLUTION INTRAVENOUS CONTINUOUS
Status: DISCONTINUED | OUTPATIENT
Start: 2017-05-09 | End: 2017-05-13

## 2017-05-09 RX ORDER — EPHEDRINE SULFATE 50 MG/ML
INJECTION, SOLUTION INTRAMUSCULAR; INTRAVENOUS; SUBCUTANEOUS PRN
Status: DISCONTINUED | OUTPATIENT
Start: 2017-05-09 | End: 2017-05-09

## 2017-05-09 RX ORDER — HEPARIN SODIUM 10000 [USP'U]/100ML
0-3500 INJECTION, SOLUTION INTRAVENOUS
Status: DISCONTINUED | OUTPATIENT
Start: 2017-05-09 | End: 2017-05-10

## 2017-05-09 RX ORDER — SODIUM CHLORIDE 9 MG/ML
INJECTION, SOLUTION INTRAVENOUS CONTINUOUS PRN
Status: DISCONTINUED | OUTPATIENT
Start: 2017-05-09 | End: 2017-05-09

## 2017-05-09 RX ADMIN — HEPARIN SODIUM 1450 UNITS/HR: 10000 INJECTION, SOLUTION INTRAVENOUS at 03:05

## 2017-05-09 RX ADMIN — TAMSULOSIN HYDROCHLORIDE 0.4 MG: 0.4 CAPSULE ORAL at 09:13

## 2017-05-09 RX ADMIN — LORATADINE 10 MG: 10 TABLET ORAL at 09:14

## 2017-05-09 RX ADMIN — ATORVASTATIN CALCIUM 10 MG: 10 TABLET, FILM COATED ORAL at 09:14

## 2017-05-09 RX ADMIN — PIPERACILLIN AND TAZOBACTAM 3.38 G: 3; .375 INJECTION, POWDER, LYOPHILIZED, FOR SOLUTION INTRAVENOUS; PARENTERAL at 21:11

## 2017-05-09 RX ADMIN — LIDOCAINE HYDROCHLORIDE 60 MG: 20 INJECTION, SOLUTION INFILTRATION; PERINEURAL at 19:10

## 2017-05-09 RX ADMIN — Medication 100 MG: at 09:14

## 2017-05-09 RX ADMIN — ACETAMINOPHEN 650 MG: 325 TABLET, FILM COATED ORAL at 23:12

## 2017-05-09 RX ADMIN — PIPERACILLIN SODIUM,TAZOBACTAM SODIUM 3.38 G: 3; .375 INJECTION, POWDER, FOR SOLUTION INTRAVENOUS at 01:39

## 2017-05-09 RX ADMIN — METOPROLOL SUCCINATE 50 MG: 50 TABLET, EXTENDED RELEASE ORAL at 09:13

## 2017-05-09 RX ADMIN — PANTOPRAZOLE SODIUM 40 MG: 40 TABLET, DELAYED RELEASE ORAL at 09:14

## 2017-05-09 RX ADMIN — DEXTROSE AND SODIUM CHLORIDE: 5; 900 INJECTION, SOLUTION INTRAVENOUS at 07:39

## 2017-05-09 RX ADMIN — PHENYLEPHRINE HYDROCHLORIDE 100 MCG: 10 INJECTION, SOLUTION INTRAMUSCULAR; INTRAVENOUS; SUBCUTANEOUS at 19:17

## 2017-05-09 RX ADMIN — SIMETHICONE CHEW TAB 80 MG 80 MG: 80 TABLET ORAL at 00:56

## 2017-05-09 RX ADMIN — ETOMIDATE 20 MG: 2 INJECTION INTRAVENOUS at 19:10

## 2017-05-09 RX ADMIN — ONDANSETRON 4 MG: 2 INJECTION INTRAMUSCULAR; INTRAVENOUS at 00:14

## 2017-05-09 RX ADMIN — PHENYLEPHRINE HYDROCHLORIDE 100 MCG: 10 INJECTION, SOLUTION INTRAMUSCULAR; INTRAVENOUS; SUBCUTANEOUS at 19:12

## 2017-05-09 RX ADMIN — BISACODYL 5 MG: 5 TABLET, COATED ORAL at 01:38

## 2017-05-09 RX ADMIN — LEVETIRACETAM 750 MG: 750 TABLET, FILM COATED ORAL at 09:13

## 2017-05-09 RX ADMIN — HYDRALAZINE HYDROCHLORIDE 10 MG: 20 INJECTION INTRAMUSCULAR; INTRAVENOUS at 00:21

## 2017-05-09 RX ADMIN — PHENYLEPHRINE HYDROCHLORIDE 100 MCG: 10 INJECTION, SOLUTION INTRAMUSCULAR; INTRAVENOUS; SUBCUTANEOUS at 19:20

## 2017-05-09 RX ADMIN — SODIUM CHLORIDE: 9 INJECTION, SOLUTION INTRAVENOUS at 18:42

## 2017-05-09 RX ADMIN — ACETAMINOPHEN 650 MG: 325 TABLET, FILM COATED ORAL at 02:19

## 2017-05-09 RX ADMIN — Medication 5 MG: at 19:10

## 2017-05-09 RX ADMIN — HEPARIN SODIUM 1450 UNITS/HR: 10000 INJECTION, SOLUTION INTRAVENOUS at 22:17

## 2017-05-09 RX ADMIN — ONDANSETRON 4 MG: 2 INJECTION INTRAMUSCULAR; INTRAVENOUS at 19:59

## 2017-05-09 RX ADMIN — LEVETIRACETAM 750 MG: 100 INJECTION, SOLUTION INTRAVENOUS at 21:12

## 2017-05-09 RX ADMIN — METOPROLOL SUCCINATE 25 MG: 25 TABLET, EXTENDED RELEASE ORAL at 23:12

## 2017-05-09 RX ADMIN — PIPERACILLIN SODIUM,TAZOBACTAM SODIUM 3.38 G: 3; .375 INJECTION, POWDER, FOR SOLUTION INTRAVENOUS at 09:13

## 2017-05-09 RX ADMIN — OLOPATADINE HYDROCHLORIDE 1 DROP: 1 SOLUTION/ DROPS OPHTHALMIC at 09:15

## 2017-05-09 RX ADMIN — MELATONIN TAB 3 MG 3 MG: 3 TAB at 23:12

## 2017-05-09 RX ADMIN — CISATRACURIUM BESYLATE 10 MG: 2 INJECTION INTRAVENOUS at 19:10

## 2017-05-09 RX ADMIN — LOSARTAN POTASSIUM 25 MG: 25 TABLET, FILM COATED ORAL at 09:13

## 2017-05-09 RX ADMIN — ESMOLOL HYDROCHLORIDE 30 MG: 10 INJECTION, SOLUTION INTRAVENOUS at 20:29

## 2017-05-09 ASSESSMENT — PAIN DESCRIPTION - DESCRIPTORS: DESCRIPTORS: ACHING;CRAMPING;DISCOMFORT

## 2017-05-09 NOTE — PLAN OF CARE
"Problem: Goal Outcome Summary  Goal: Goal Outcome Summary  Outcome: Improving  /85  Pulse 92  Temp 97.9  F (36.6  C) (Oral)  Resp 24  Ht 1.727 m (5' 8\")  Wt 81.6 kg (180 lb)  SpO2 100%  BMI 27.37 kg/m2     AOx4, Lao speaking, family at bedside all day,  present for rounds/assessment, blue  phone at bedside.  VSS, SR, Heart Mate II, no alarms this shift, on room air, afebrile.  C/O RUQ and generalized abdominal pain, received fleets enema, tap water enema and suppository, small formed stool x 1, continues to have gas/constipation pain, PRN Tylenol and oxycodone.  Intermittent nausea, NPO, plan for gallbladder drainage tomorrow.  Mcarthur in place for retention, good urine ouput.  IVF at 75mL/hr, Heparin gtt at 1450 units/hr, 10a therapeutic.  Will continue with plan of care.      "

## 2017-05-09 NOTE — OR NURSING
Dr. Grove did not want pre-op labs drawn that were ordered.  Heparin 10Xa will be ordered per Dr. Zazueta.  Heparin drip was discontinued at 1130.

## 2017-05-09 NOTE — ANESTHESIA PREPROCEDURE EVALUATION
Anesthesia Evaluation     . Pt has had prior anesthetic.     No history of anesthetic complications          ROS/MED HX    ENT/Pulmonary:       Neurologic:     (+)CVA date: 2013 TIA     Cardiovascular:     (+) hypertension--CAD, --. : . CHF . . :. .       METS/Exercise Tolerance:     Hematologic:         Musculoskeletal:         GI/Hepatic:     (+) GERD       Renal/Genitourinary:     (+) chronic renal disease, type: ESRD,       Endo:     (+) thyroid problem .      Psychiatric:         Infectious Disease:         Malignancy:         Other:                     Physical Exam  Normal systems: dental    Airway   Mallampati: III  TM distance: >3 FB  Neck ROM: limited    Dental     Cardiovascular   Rhythm and rate: regular and normal      Pulmonary (+) rales                       Anesthesia Plan      History & Physical Review  History and physical reviewed and following examination; no interval change.    ASA Status:  4 .    NPO Status:  > 8 hours    Plan for General and ETT with Intravenous induction. Maintenance will be Balanced.      Additional equipment: Videolaryngoscope, 2nd IV and Arterial Line      Postoperative Care      Consents  Anesthetic plan, risks, benefits and alternatives discussed with:  Patient and Daughter/Son.  Use of blood products discussed: Yes.   Use of blood products discussed with Patient and Daughter/Son.  Consented to blood products.  .                          ANESTHESIA PREOP EVALUATION    Procedure: ENDOSCOPIC RETROGRADE CHOLANGIOPANCREATOGRAM;ENDOSCOPIC RET*    HPI:     PMHx/PSHx/ROS:  Past Medical History:   Diagnosis Date     Acute right MCA stroke (H) 6/2013    With L sided hemiparesis      Anemia of chronic disease     Baseline Hb 8-9     BPH (benign prostatic hyperplasia)      CHF (congestive heart failure), NYHA class IV (H) 10/9/2012    s/p HeartMate II.  Was on milrinone and dobutamine prior to LVAD      CKD (chronic kidney disease)     Baseline Cr=     Clostridium difficile  colitis 12/2012      Dysphagia     PEG tube placed in 2012.  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  Normal carotid duplex 3/2014.       Esophagitis 12/2012    EGD with esophagitis and multiple douenal ulcers     Fracture of femoral neck, right, closed 2/3/2015    Presumed pathologic as patient is non-weight-bearing and suffered no trauma.  Family declined operative repair during hospital stay 1/23 - 1/30/15.     Gastric and duodenal angiodysplasia with hemorrhage 6/18/2015     GERD (gastroesophageal reflux disease)      Gout      HTN (hypertension)     LDL=59 (3/29/2014)     Hyperlipaemia      Ischemic cardiomyopathy      Mitral regurgitation     s/p MVR with Carbomedics ring      Myocardial infarction (H) 1998    In [facility] without intervention      Nonsenile cataract     BE     PFO (patent foramen ovale)     s/p closure (10/9/2012)     TB lung, latent     s/p 9 months INH in 2012        Past Surgical History:   Procedure Laterality Date     C CABG, VEIN, FIVE  2001     CATARACT IOL, RT/LT Right 11/19/2015     ESOPHAGOSCOPY, GASTROSCOPY, DUODENOSCOPY (EGD), COMBINED N/A 6/10/2015    Procedure: COMBINED ESOPHAGOSCOPY, GASTROSCOPY, DUODENOSCOPY (EGD);  Surgeon: Edu Jenkins MD;  Location:  GI     ESOPHAGOSCOPY, GASTROSCOPY, DUODENOSCOPY (EGD), COMBINED N/A 6/15/2015    Procedure: COMBINED ESOPHAGOSCOPY, GASTROSCOPY, DUODENOSCOPY (EGD);  Surgeon: Yury Bonner MD;  Location: [facility] OR     H INSERT ICD  2/10/2011    And pacemaker.  Not BiV     INSERT VENTRICULAR ASSIST DEVICE LEFT (HEARTMATE II)  10/9/2012     IR GASTRO JEJUNOSTOMY TUBE PLACEMENT       PHACOEMULSIFICATION CLEAR CORNEA WITH STANDARD INTRAOCULAR LENS IMPLANT Right 11/19/2015    Procedure: PHACOEMULSIFICATION CLEAR CORNEA WITH STANDARD INTRAOCULAR LENS IMPLANT;  Surgeon: Violeta Cosme MD;  Location: [facility] OR     REPAIR PATENT FORAMEN OVALE   10/9/2012     REPAIR VALVE MITRAL  2/9/2012    28 mm Carbomedics 2/3 ring          Past Anes Hx: No personal or family h/o anesthesia problems    Soc Hx:   Social History     Social History     Marital status:      Spouse name: N/A     Number of children: N/A     Years of education: N/A     Social History Main Topics     Smoking status: Former Smoker     Smokeless tobacco: Former User     Alcohol use No     Drug use: No     Sexual activity: Not on file     Other Topics Concern     Not on file     Social History Narrative         Allergies:   Allergies   Allergen Reactions     Seasonal Allergies        Meds:   Prescriptions Prior to Admission   Medication Sig Dispense Refill Last Dose     atorvastatin (LIPITOR) 10 MG tablet TAKE 1 TABLET (10 MG) BY MOUTH DAILY 90 tablet 3      warfarin (COUMADIN) 3 MG tablet Take 3mg by mouth on Mondays and Thursdays. Take 4.5mg by mouth all other days of the week. 180 tablet 3      calcium carbonate-vitamin D 500-400 MG-UNIT TABS per tablet Take 1 tablet by mouth daily 90 tablet 3      oxyCODONE (ROXICODONE) 5 MG IR tablet Take 1 tablet (5 mg) by mouth every 4 hours as needed for moderate to severe pain 30 tablet 0      melatonin 3 MG tablet Take 1 tablet (3 mg) by mouth At Bedtime   Past Week at Unknown time     Thiamine HCl (VITAMIN B-1) 100 MG TABS TAKE 1 TABLET (100 MG) BY MOUTH DAILY 90 tablet 3 Past Week at Unknown time     losartan (COZAAR) 25 MG tablet Take 1 tablet (25 mg) by mouth daily 45 tablet 3 Past Week at Unknown time     allopurinol (ZYLOPRIM) 100 MG tablet Take 1 tablet (100 mg) by mouth daily 90 tablet 3 Past Week at Unknown time     pantoprazole (PROTONIX) 40 MG enteric coated tablet Take 1 tablet (40 mg) by mouth daily 180 tablet 3 Past Week at Unknown time     tamsulosin (FLOMAX) 0.4 MG 24 hr capsule Take 1 capsule (0.4 mg) by mouth daily 90 capsule 3 Past Week at Unknown time     senna-docusate (SENOKOT-S;PERICOLACE) 8.6-50 MG per tablet Take 1-2  tablets by mouth 2 times daily as needed for constipation 60 tablet 3 Past Week at Unknown time     azelastine (OPTIVAR) 0.05 % SOLN Apply 1 drop to eye 2 times daily (Patient taking differently: Apply 1 drop to eye as needed ) 1 Bottle 11 Past Week at Unknown time     loratadine (CLARITIN) 10 MG tablet Take 1 tablet (10 mg) by mouth daily 90 tablet 3 Past Week at Unknown time     levETIRAcetam (KEPPRA) 750 MG tablet Take 1 tablet (750 mg) by mouth 2 times daily 180 tablet 3 Past Week at Unknown time     ferrous sulfate (IRON) 325 (65 FE) MG tablet Take 1 tablet (325 mg) by mouth daily (with breakfast) 90 tablet 3 Past Week at Unknown time     ascorbic acid (VITAMIN C) 500 MG tablet Take 1 tablet (500 mg) by mouth daily With iron pill 90 tablet 3 Past Week at Unknown time     metoprolol (TOPROL-XL) 25 MG 24 hr tablet Take 1 tablet (25 mg) by mouth every evening 90 tablet 3 Past Week at Unknown time     metoprolol (TOPROL-XL) 50 MG 24 hr tablet Take 1 tablet (50 mg) by mouth daily 90 tablet 3 Past Week at Unknown time     acetaminophen (TYLENOL) 325 MG tablet Take 2 tablets (650 mg) by mouth every 6 hours (Patient taking differently: Take 650 mg by mouth as needed ) 100 tablet 0 Past Week at Unknown time     [] ondansetron (ZOFRAN ODT) 4 MG ODT tab Take 1 tablet (4 mg) by mouth every 8 hours as needed for nausea 10 tablet 0 Unknown at Unknown time     simethicone (MYLICON) 80 MG chewable tablet Take 1 tablet (80 mg) by mouth 4 times daily 180 tablet 5 Unknown at Unknown time     order for DME Equipment being ordered: Cushioned heel boots. 2 each 0 Past Week at Unknown time     guaiFENesin-dextromethorphan (ROBITUSSIN DM) 100-10 MG/5ML syrup Take 5 mLs by mouth every 4 hours as needed for cough 560 mL 3 Past Week at Unknown time     order for DME Equipment being ordered: Wheelchair, manual, with elevated leg rests and tilt in space back.  Please fax to [facility]; I called them 16 and they requested the new  order.  Face to face is in my 10/26/16 note. 1 each 0 Past Week at Unknown time     order for DME Equipment being ordered: Wheelchair, manual. 1 each 0 Past Week at Unknown time     order for DME Equipment being ordered: Hospital Bed, fully electric. 1 each 0 Past Week at Unknown time     blood glucose monitoring (NO BRAND SPECIFIED) test strip Use to test blood sugar 2 times daily or as directed. 1 Box 3 Past Week at Unknown time     order for DME Equipment being ordered: Reclining manual w/c with bilateral elevating leg rests. 1 each 0 Past Week at Unknown time     nystatin (MYCOSTATIN) 010039 UNIT/GM POWD Apply to affected areas of skin in the groin and on the scrotum three times a day as needed. 60 g 3 Unknown at Unknown time     carboxymethylcellulose (REFRESH PLUS) 0.5 % SOLN Place 1 drop into the right eye 3 times daily as needed for other (eye irritation or discomfort.) 30 mL 3 Past Week at Unknown time     order for DME Equipment being ordered: Bilateral leg supports for manual wheelchair. 2 each 0 Past Week at Unknown time     olopatadine (PATANOL) 0.1 % ophthalmic solution Place 1 drop into both eyes 2 times daily 1 Bottle 11 Unknown at Unknown time     nitroglycerin (NITROSTAT) 0.4 MG SL tablet Place 1 tablet (0.4 mg) under the tongue every 5 minutes as needed for chest pain 30 tablet 1 Unknown at Unknown time     blood glucose monitoring (NO BRAND SPECIFIED) lancets Use to test blood sugars 2 times daily or as directed. 1 Box 3 Past Week at Unknown time     ORDER FOR DME Equipment being ordered: Lift chair.    Please see indications and face-to-face encounter details in 2/3/15 Office Visit note. 1 Device 0 Past Week at Unknown time     bisacodyl (DULCOLAX) 5 MG EC tablet Take 1 tablet (5 mg) by mouth daily as needed for constipation 20 tablet 1 Unknown at Unknown time       No current outpatient prescriptions on file.       Physical Exam:  VS: T 97.5, P 92, BP 99/87, R 18, SpO2 97%     Weight:   Wt  "Readings from Last 2 Encounters:   05/02/17 81.6 kg (180 lb)   05/02/17 81.6 kg (180 lb)     Height:   Ht Readings from Last 2 Encounters:   05/02/17 1.727 m (5' 8\")   05/02/17 1.727 m (5' 8\")       NPO Status: > 8 hrs    Labs    BMP:  Lab Results   Component Value Date     05/09/2017      Lab Results   Component Value Date    POTASSIUM 3.4 05/09/2017     Lab Results   Component Value Date    CHLORIDE 109 05/09/2017     Lab Results   Component Value Date    JOSÉ 8.2 05/09/2017     Lab Results   Component Value Date    CO2 18 05/09/2017     Lab Results   Component Value Date    BUN 15 05/09/2017     Lab Results   Component Value Date    CR 1.55 05/09/2017     Lab Results   Component Value Date     05/09/2017        CBC:  Lab Results   Component Value Date    WBC 14.2 05/09/2017     Lab Results   Component Value Date    HGB 11.0 05/09/2017     Lab Results   Component Value Date    HCT 32.3 05/09/2017     Lab Results   Component Value Date     05/09/2017      Anesthesia plan was discussed in detail with the patient and family they understood and agreed to proceed as planned all questions answered. .[facility] interpretor [id number].        Shiva Zazueta MD    5/9/2017  7:35 PM      "

## 2017-05-09 NOTE — PLAN OF CARE
Problem: Goal Outcome Summary  Goal: Goal Outcome Summary  Outcome: No Change  Neuro: A&Ox4.   Cardiac: SR. LVAD parameters WDL; no unexpected alarms. Dressing changed on shift. PRN hydralazine given x1; with results. VSS  Respiratory: Sating upper 90's on RA.  GI/: Mcarthur intact with adequate urine output. No BM on shift; pt passing minimal flatus.  Diet/appetite: NPO since midnight for possible procedure today.   Activity:  Assist via lift; up to chair x1.  Pain: Pt continues to complain of abdominal discomfort. PRN simethicone, zofran, &  Tylenol given when available. With some relief.   Skin: Intact, no new deficits noted.     - Heparin gtt @ 1450 units/hr.      R: Pt's family at bedside and helpful with cares. Will continue with POC and notify primary team with changes.

## 2017-05-09 NOTE — PROGRESS NOTES
5/9/2017 Gastroenterology Brief Note    Chart reviewed, patient seen and examined at 1230. Daughters x 2 + son in room  Had large BM today, improved abdominal pain  Tbili increased today (2.0->5.0)  Patient afebrile, WBC decreased, HDS    Recs:  Plan for ERCP today with transpapillary cystic duct stenting for GB drainage.  Holding heparin (stopped at 11:35, but also held for 1 hour this morning due to critically high 10A level)  Will give FFP prior to procedure as well, INR goal <1.5  Discussed with patient, family, RN and primary team      Above in collaboration with Dr. Kvng Fleming PA-C  [facility]  Pager *[phone]

## 2017-05-09 NOTE — PROVIDER NOTIFICATION
Dr. Frias notified that patient's BP is still elevated 130s-140s/90s.  Order received for PRN Hydralazine.

## 2017-05-09 NOTE — PROGRESS NOTES
[facility] [place]    Internal Medicine Progress Note - Gold Service      Assessment & Plan   Sohan Rendon is a 67 year old male with a history of systolic CHF secondary to ischemic cardiomyopathy s/p HM II LVAD (DT) and ICD (2011) complicated by recurrent hemolysis and pump thrombus, multiple CVAs with residual left-sided weakness, PFO s/p closure in 10/2012, mitral regurgitation s/p MVR in 2/2012, CKD, hematuria secondary to anti-coagulation, latent TB s/p INH treatment, seizure, HTN, gout, chronic anemia, BPH, esophagitis, GERD, PUD, C.diff colitis, and recent admission (2/11-2/15) for appendicitis which was managed conservatively who was admitted on 5/2/2017 with right-sided abdominal pain secondary to acalculous cholecystitis.    Acute Acalculous Cholecystitis - Presented with right-sided abdominal pain. Abdominal US on admission (5/3) showed gallbladder sludge with non-specific mild gallbladder wall thickening w/o cholelithiasis. Evaluated by General Surgery (5/3) who did not feel exam, imaging, or history was consistent with cholecystitis and did not recommend surgical intervention. Developed increasing CRP, Procalcitonin, and WBC prompting repeat RUQ US (5/7) which showed gallbladder wall had doubled in size (from 3.5 mm to 7.4 mm) with new pericholecystic fluid and demonstrated gallbladder sludge consistent with acalculous cholecystitis. LFTs wnl on admission (5/2) with acute increase on 5/7 and progressive worsening since. TBili 5.0 (2.0), Alk Phos 360 (228),  (38),  (77) today. Afebrile. WBC 14.2 today (improved from 15.7 on 5/8 and peak of 19.0 this admit). No lactic acidosis. Remainder of infectious work-up negative to date including Blood Cultures X 4, Sputum Cx, Urine Culture, C.diff PCR, and Enteric XOCHILT.  - Plan for ERCP today with transpapillary cystic duct stenting for gallbladder drainage.   - NPO for GI procedure. Continue D5 and 0.9% NaCl @ 75 cc/h  while NPO. ADAT post-operatively. Discontinue IVF once tolerating PO intake.  - Continue IV Zosyn (started 5/7)   - No NSAIDs  - Zofran prn for n/v  - Tylenol 650 mg q 6h prn and Oxycodone 5 mg q 4h prn for pain  - Trend CMP and CBC on 5/10    Chronic Anticoagulation secondary to LVAD - INR goal 1.8-2.5 in setting of recurrent hematuria. INR supra-therapeutic on admission with peak of 4.19 on 5/4; likely due to decreased PO intake. Coumadin held 5/4-5/5, resumed 5/6-5/7, now on hold since 5/8 due to upcoming procedure. Received 2 mg IV Vitamin K on 5/7 for INR reversal prior to procedure. INR 1.69 today.  - INR goal <1.5 for GI procedure. Transfuse 1 u FFP followed by repeat INR. Give additional FFP units as needed until INR at goal.  - Coumadin to remain on hold. Resume as soon as safe from GI standpoint post-operatively.    - Bridging with Heparin gtt while Coumadin on hold. Hold Heparin gtt for procedure this afternoon (needs to be off for 6 hours prior). Resume ASAP post-operatively.  - Avoid Vitamin K for INR reversal per Cardiology given need to bridge with Heparin gtt until INR therapeutic post-operatively and high risk for recurrent hematuria on Heparin gtt.    Chronic Systolic Heart Failure secondary to Ischemic Cardiomyopathy s/p ICD and HM II LVAD (10/9/2012) - As destination therapy (DT). Stage D, NYHA Class III. 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 Most recent weight 81.6 kg (180 lbs) on 5/2. Appears euvolemic on exam aside from trace BLE edema. Evaluated by Cardiology X 2, last visit 5/8.  - Anticoagulation as above  - LVAD settings: Flow ~4.5, PI ~5.5, Speed 8800, Power ~5.5. No alarms over past 24 hours.  - Continue Losartan and Toprol-XL per home  dosing.   - Not on ASA due to recurrent hemolysis  - Not on aldosterone antagonist due to renal dysfunction  - Daily weights with I/Os.   - Continue home Atorvastatin  - Daily sterile dressing changes to driveline site. No evidence of driveline infection on exam or abscess on CT.    HTN - MAP goal 65-85. MAPs (obtained via BP cuff) in 80s-100s over past 24 hours. Asked RN staff to check MAP via Doppler this morning which was 47. Unclear why there is such a significant different between cuff and doppler MAP - question if related to side of body used given left-sided hemiparesis.   - Continue home regimen of Cozaar 25 mg QD and Toprol-XL 50 mg q AM/25 mg q PM. Hold for MAP <60.   - Hydralazine PRN for MAP >90 mmHG  - Avoid BP checks on left side given hemiparesis  - Monitor BP via Doppler rather than BP cuff per discussion with LVAD coordinator and Cards II team    Generalized Abdominal Pain and Distention secondary to Constipation - Developed 5/8. AXR (5/8) showed non-obstructive bowel pattern. Significantly improved after passing large amount of flatus and having BM this morning.  - Start Senna-Docusate 1 tab BID (hold for loose stools)  - Dulcolax suppositories, Senna-Docusate, and PO Dulcolax available prn   - Simethicone prn for abdominal cramping  - ADAT following procedure    Urinary Retention in setting of BPH - On Flomax 0.4 mg PTA. Developed urinary retention this hospitalization s/p Mcarthur placement on 5/5.   - Hold home Flomax while Mcarthur in place  - Continue indwelling Mcarthur. Follow up with Urology 1 week after discharge to assess for Mcarthur removal.    NAGMA - Since 5/3. Likely secondary to CKD. Bicarb 18 (18), Chloride 109 (WNL), and AG 10 (WNL) today.   - Trend daily BMP.   - Consider Sodium Bicarb initiation if worsens or becomes symptomatic.    Chronic Stable Medical Conditions and Resolved Hospital Issues  Duodenitis - Admission CT Chest/Abd/Pelvis (5/5) with inflammatory fat stranding around second  portion of duodenum with extension along the right anterior pararenal fascia c/w duodenitis; no localized fluid collections or free air to suggest perforation. Repeat CT (5/6) showed near complete resolution. H. Pylori stool antigen, C.diff PCR, and Enteric XOCHILT negative on 5/7. Continue Protonix 40 mg QD.  Multiple CVAs - Residual left-sided hemiparesis. Continue home Atorvastatin. Consult PT/OT for discharge planning.  Seizure Disorder - No recent seizures. Continue PTA Keppra.  CKD - Baseline Cr ~1.6-1.8. Cr stable at 1.55 today. Adequate UOP. Avoid nephrotoxins. Renally adjust medications. Trend daily BMP.  Gout - Stable without evidence of flare. Continue PTA Allopurinol.  Chronic Normocytic Anemia - Secondary to iron deficiency and anemia of chronic disease. Baseline Hgb ~13-14. Hgb 13.2 on admission. Hgb decreased to low of 10.9 on 5/8. Hgb stable at 11.0 today. No evidence of bleeding. Acute Hgb decrease likely secondary to infection and holding home iron supplements in setting of infection. Resume home iron supplements once infection resolves. Trend daily CBC.  Seasonal Allergies - Stable. Continue Claritin daily per PTA regimen.    Diet: NPO  Fluids: D5 with 0.9% NaCl at 75 mL/h  DVT Prophylaxis: Heparin gtt which is being held prior to today's procedure.   Code Status: Full Code    Disposition Plan   Expected discharge in 2-4 days once INR therapeutic and tolerating PO intake. Recommended to home with resumption of home PCA hours (16 hours/day) and home health care.      Entered: Kelin Geiger 05/09/2017, 11:49 AM    The patient's care was discussed with the Attending Physician, Dr. Peters, Bedside Nurse and GI Team.    Holli Geiger PA-C  Internal Medicine Hospitalist Service  [facility]  Pager: [phone]  Please see sticky note for cross cover information    Interval History   Abdominal pain and distention improved this morning after passing large amount of flatus and having  BM this morning. Abdominal pain worsens with lying flat, sneezing, or coughing. Denies any nausea, vomiting, chest pain, or SOB. Mcarthur catheter in place and patent; draining dark yellow urine. Afebrile.     Data reviewed today: I reviewed all medications, new labs and imaging results over the last 24 hours. I personally reviewed CMP, CBC, Lactic Acid, CBC, INR, Heparin 10A level, AXR, H.Pylori Antigen, C.diff PCR, Enteric Bacterial and Viral XOCHILT, Blood Cultures X 2 from 5/6 which are negative to date.    Physical Exam   Vital Signs: Temp: 97.4  F (36.3  C) Temp src: Axillary BP: (!) 84/73 Pulse: 92 Heart Rate: 66 Resp: 18 SpO2: 100 % O2 Device: None (Room air)    Weight: 180 lbs 0 oz  General Appearance: Chronically ill male who is awake, sitting up in bed, non-toxic appearing, and in NAD.  HEENT: NC/AT. Anicteric sclera. Mucous membranes moist.  Cardiovascular: LVAD hum. S1,S2.   Respiratory: Normal effort on 1L O2 via NC. No wheezes, rhonchi, or crackles.  GI: Soft and moderately distended. Bowel sounds present. Tender to palpation of RUQ pain.   Extremities: Trace to 1+ pitting edema of BLE. Warm and well perfused.  Skin: LVAD driveline with dressing in place which is c/d/i. No rashes or jaundice.      Data   Medications     dextrose 5% and 0.9% NaCl 75 mL/hr at 05/09/17 0739     - MEDICATION INSTRUCTIONS -         piperacillin-tazobactam  3.375 g Intravenous Q6H     pantoprazole  40 mg Oral QAM AC     olopatadine  1 drop Both Eyes BID     lidocaine 2 %  10 mL Urethral Once     allopurinol  100 mg Oral Daily     atorvastatin  10 mg Oral Daily     levETIRAcetam  750 mg Oral BID     loratadine  10 mg Oral Daily     losartan  25 mg Oral Daily     melatonin  3 mg Oral At Bedtime     tamsulosin  0.4 mg Oral Daily     thiamine  100 mg Oral Daily     sodium chloride (PF)  3 mL Intracatheter Q8H     metoprolol  50 mg Oral Daily     metoprolol  25 mg Oral QPM     Data     Recent Labs  Lab 05/09/17  0632 05/08/17  1102  05/08/17  0553 05/07/17  0725  05/03/17  0350 05/02/17  2247   WBC 14.2* 15.7* 14.2* 15.7*  < >  --  15.7*   HGB 11.0* 12.7* 10.9* 11.1*  < >  --  13.7   MCV 92 92 94 94  < >  --  95    224 228 180  < >  --  179   INR 1.69*  --  1.94* 2.04*  < >  --  2.93*     --  138 139  < >  --  134   POTASSIUM 3.4  --  3.5 3.5  < >  --  3.8   CHLORIDE 109  --  110* 110*  < >  --  105   CO2 18*  --  18* 19*  < >  --  20   BUN 15  --  18 20  < >  --  15   CR 1.55*  --  1.63* 1.75*  < >  --  1.57*   ANIONGAP 10  --  10 10  < >  --  9   JOSÉ 8.2*  --  8.4* 8.3*  < >  --  8.5   *  --  144* 151*  < >  --  208*   ALBUMIN 2.0*  --  2.3* 2.3*  --   --  3.2*   PROTTOTAL 6.0*  --  6.5* 6.4*  --   --  7.4   BILITOTAL 5.0*  --  2.0* 1.6*  --   --  0.9   ALKPHOS 360*  --  228* 164*  --   --  105   *  --  38 31  --   --  10   *  --  77* 43  --   --  27   LIPASE  --   --   --   --   --  47* 53*   TROPI  --   --   --   --   --  <0.015The 99th percentile for upper reference range is 0.045 ug/L.  Troponin values in the range of 0.045 - 0.120 ug/L may be associated with risks of adverse clinical events. <0.015The 99th percentile for upper reference range is 0.045 ug/L.  Troponin values in the range of 0.045 - 0.120 ug/L may be associated with risks of adverse clinical events.   < > = values in this interval not displayed.  No results found for this or any previous visit (from the past 24 hour(s)).    Attestation:  I, Gabe Peters, saw and evaluated Sohan Rendon as part of a shared visit.  I have reviewed and discussed with the advanced practice provider their history, physical and plan.      I have reviewed today's care team notes, Medications, Vital Signs and Labs.     My key history or physical exam findings: No acute events. Feels well this am. Pain improved and distention improved after passing gas and stool this am. Denies N/V.      Physical exam:  General: Alert and oriented, well nourished, in  NAD  Chest/Pulmonary: CTAB, moving air well, no rales or wheezes, no tachypnea   Cardiovascular: LVAD sounds, trace LE edema. Good perfusion.  Abdomen: NABS, soft, tender in RUQ only. G tube in place in epigastric.   Skin: no diaphoresis, skin color normal     Key management decisions made by me:   # Acute cholecystitis: GI planning for transpapillary GB drainage later today. Transfuse FFP to get INR closer to 1.5. D/c heparin gtt. Continue zosyn for now.    Tejas Peters MD   of Medicine  Med-Peds Hospitalist  Pager [phone]     Date of service: 05/09/17

## 2017-05-09 NOTE — PROVIDER NOTIFICATION
Dr. Frias notified that patient is complaining of generalized abdominal pain, abd is distended, had very small BM overnight.  Order received for fleets enema.

## 2017-05-09 NOTE — PROVIDER NOTIFICATION
Provider notified at this time re: critical lab value. Heparin 10a of 1.12. Notified him that nurse will follow protocol (hold for 60 minutes and restart at lower rate). No new orders at this time.

## 2017-05-09 NOTE — OR NURSING
Vicky RN, Pacemaker Nurse, notified that patient has a Cardioverter/Defibrillator [employer] Single Chamber.  RN stated that a magnet should be placed over the pacemaker during the case and removed at the end of the case and the defibrillator will resume working.    LVAD coordinator will be notified when the patient is ready for surgery.

## 2017-05-09 NOTE — PROVIDER NOTIFICATION
Dr. Frias notified that patient's blood pressure is 140s/90s-100s.  Instructed to give AM dose of Flomax that patient missed due to N/V.

## 2017-05-10 ENCOUNTER — APPOINTMENT (OUTPATIENT)
Dept: GENERAL RADIOLOGY | Facility: CLINIC | Age: 67
DRG: 408 | End: 2017-05-10
Attending: INTERNAL MEDICINE
Payer: COMMERCIAL

## 2017-05-10 ENCOUNTER — OFFICE VISIT (OUTPATIENT)
Dept: INTERPRETER SERVICES | Facility: CLINIC | Age: 67
End: 2017-05-10

## 2017-05-10 LAB
ALBUMIN SERPL-MCNC: 2.1 G/DL (ref 3.4–5)
ALP SERPL-CCNC: 352 U/L (ref 40–150)
ALT SERPL W P-5'-P-CCNC: 124 U/L (ref 0–70)
ANION GAP SERPL CALCULATED.3IONS-SCNC: 9 MMOL/L (ref 3–14)
AST SERPL W P-5'-P-CCNC: 238 U/L (ref 0–45)
BILIRUB SERPL-MCNC: 5.6 MG/DL (ref 0.2–1.3)
BUN SERPL-MCNC: 15 MG/DL (ref 7–30)
CALCIUM SERPL-MCNC: 8.2 MG/DL (ref 8.5–10.1)
CHLORIDE SERPL-SCNC: 112 MMOL/L (ref 94–109)
CO2 SERPL-SCNC: 20 MMOL/L (ref 20–32)
CREAT SERPL-MCNC: 1.65 MG/DL (ref 0.66–1.25)
ERYTHROCYTE [DISTWIDTH] IN BLOOD BY AUTOMATED COUNT: 16.7 % (ref 10–15)
GFR SERPL CREATININE-BSD FRML MDRD: 42 ML/MIN/1.7M2
GLUCOSE BLDC GLUCOMTR-MCNC: 123 MG/DL (ref 70–99)
GLUCOSE SERPL-MCNC: 133 MG/DL (ref 70–99)
HCT VFR BLD AUTO: 32.6 % (ref 40–53)
HGB BLD-MCNC: 10.6 G/DL (ref 13.3–17.7)
INR PPP: 1.49 (ref 0.86–1.14)
LIPASE SERPL-CCNC: 98 U/L (ref 73–393)
LMWH PPP CHRO-ACNC: 0.72 IU/ML
LMWH PPP CHRO-ACNC: 1.08 IU/ML
LMWH PPP CHRO-ACNC: 1.2 IU/ML
MCH RBC QN AUTO: 30.2 PG (ref 26.5–33)
MCHC RBC AUTO-ENTMCNC: 32.5 G/DL (ref 31.5–36.5)
MCV RBC AUTO: 93 FL (ref 78–100)
PLATELET # BLD AUTO: 229 10E9/L (ref 150–450)
POTASSIUM SERPL-SCNC: 3.2 MMOL/L (ref 3.4–5.3)
POTASSIUM SERPL-SCNC: 3.7 MMOL/L (ref 3.4–5.3)
PROT SERPL-MCNC: 6.2 G/DL (ref 6.8–8.8)
RBC # BLD AUTO: 3.51 10E12/L (ref 4.4–5.9)
SODIUM SERPL-SCNC: 141 MMOL/L (ref 133–144)
WBC # BLD AUTO: 8.4 10E9/L (ref 4–11)

## 2017-05-10 PROCEDURE — 25000125 ZZHC RX 250: Performed by: PHYSICIAN ASSISTANT

## 2017-05-10 PROCEDURE — 36415 COLL VENOUS BLD VENIPUNCTURE: CPT | Performed by: PHYSICIAN ASSISTANT

## 2017-05-10 PROCEDURE — 80053 COMPREHEN METABOLIC PANEL: CPT | Performed by: INTERNAL MEDICINE

## 2017-05-10 PROCEDURE — 99207 ZZC APP CREDIT; MD BILLING SHARED VISIT: CPT | Performed by: PHYSICIAN ASSISTANT

## 2017-05-10 PROCEDURE — 40000893 ZZH STATISTIC PT IP EVAL DEFER: Performed by: REHABILITATION PRACTITIONER

## 2017-05-10 PROCEDURE — 25000132 ZZH RX MED GY IP 250 OP 250 PS 637: Performed by: INTERNAL MEDICINE

## 2017-05-10 PROCEDURE — 99232 SBSQ HOSP IP/OBS MODERATE 35: CPT | Performed by: NURSE PRACTITIONER

## 2017-05-10 PROCEDURE — 99233 SBSQ HOSP IP/OBS HIGH 50: CPT | Performed by: INTERNAL MEDICINE

## 2017-05-10 PROCEDURE — 25000132 ZZH RX MED GY IP 250 OP 250 PS 637: Performed by: NURSE PRACTITIONER

## 2017-05-10 PROCEDURE — 83690 ASSAY OF LIPASE: CPT | Performed by: INTERNAL MEDICINE

## 2017-05-10 PROCEDURE — T1013 SIGN LANG/ORAL INTERPRETER: HCPCS | Mod: U3

## 2017-05-10 PROCEDURE — 00000146 ZZHCL STATISTIC GLUCOSE BY METER IP

## 2017-05-10 PROCEDURE — 40000986 XR ABDOMEN PORT F1 VW

## 2017-05-10 PROCEDURE — 25000132 ZZH RX MED GY IP 250 OP 250 PS 637: Performed by: PHYSICIAN ASSISTANT

## 2017-05-10 PROCEDURE — 25000128 H RX IP 250 OP 636: Performed by: NURSE PRACTITIONER

## 2017-05-10 PROCEDURE — 85027 COMPLETE CBC AUTOMATED: CPT | Performed by: INTERNAL MEDICINE

## 2017-05-10 PROCEDURE — 85520 HEPARIN ASSAY: CPT | Performed by: INTERNAL MEDICINE

## 2017-05-10 PROCEDURE — 12000006 ZZH R&B IMCU INTERMEDIATE UMMC

## 2017-05-10 PROCEDURE — 85520 HEPARIN ASSAY: CPT | Performed by: PHYSICIAN ASSISTANT

## 2017-05-10 PROCEDURE — 74000 XR ABDOMEN PORT F1 VW: CPT

## 2017-05-10 PROCEDURE — 85610 PROTHROMBIN TIME: CPT | Performed by: INTERNAL MEDICINE

## 2017-05-10 PROCEDURE — 25000125 ZZHC RX 250: Performed by: NURSE PRACTITIONER

## 2017-05-10 PROCEDURE — 36415 COLL VENOUS BLD VENIPUNCTURE: CPT | Performed by: NURSE PRACTITIONER

## 2017-05-10 PROCEDURE — 36415 COLL VENOUS BLD VENIPUNCTURE: CPT | Performed by: INTERNAL MEDICINE

## 2017-05-10 PROCEDURE — 25000128 H RX IP 250 OP 636: Performed by: INTERNAL MEDICINE

## 2017-05-10 PROCEDURE — 84132 ASSAY OF SERUM POTASSIUM: CPT | Performed by: PHYSICIAN ASSISTANT

## 2017-05-10 PROCEDURE — 85520 HEPARIN ASSAY: CPT | Performed by: NURSE PRACTITIONER

## 2017-05-10 PROCEDURE — 25000125 ZZHC RX 250: Performed by: INTERNAL MEDICINE

## 2017-05-10 RX ORDER — POTASSIUM CHLORIDE 750 MG/1
40 TABLET, EXTENDED RELEASE ORAL ONCE
Status: DISCONTINUED | OUTPATIENT
Start: 2017-05-10 | End: 2017-05-10

## 2017-05-10 RX ORDER — LOSARTAN POTASSIUM 25 MG/1
25 TABLET ORAL 2 TIMES DAILY
Status: DISCONTINUED | OUTPATIENT
Start: 2017-05-11 | End: 2017-05-15 | Stop reason: HOSPADM

## 2017-05-10 RX ORDER — LOSARTAN POTASSIUM 25 MG/1
25 TABLET ORAL ONCE
Status: COMPLETED | OUTPATIENT
Start: 2017-05-10 | End: 2017-05-10

## 2017-05-10 RX ORDER — POTASSIUM CHLORIDE 29.8 MG/ML
20 INJECTION INTRAVENOUS ONCE
Status: DISCONTINUED | OUTPATIENT
Start: 2017-05-10 | End: 2017-05-10

## 2017-05-10 RX ORDER — WARFARIN SODIUM 5 MG/1
5 TABLET ORAL
Status: COMPLETED | OUTPATIENT
Start: 2017-05-10 | End: 2017-05-10

## 2017-05-10 RX ORDER — LACTOBACILLUS RHAMNOSUS GG 10B CELL
1 CAPSULE ORAL 2 TIMES DAILY
Status: DISCONTINUED | OUTPATIENT
Start: 2017-05-10 | End: 2017-05-15 | Stop reason: HOSPADM

## 2017-05-10 RX ORDER — LOSARTAN POTASSIUM 25 MG/1
25 TABLET ORAL 2 TIMES DAILY
Status: DISCONTINUED | OUTPATIENT
Start: 2017-05-10 | End: 2017-05-10

## 2017-05-10 RX ORDER — AMOXICILLIN 250 MG
2-3 CAPSULE ORAL 2 TIMES DAILY
Status: DISCONTINUED | OUTPATIENT
Start: 2017-05-10 | End: 2017-05-15 | Stop reason: HOSPADM

## 2017-05-10 RX ORDER — POTASSIUM CL/LIDO/0.9 % NACL 10MEQ/0.1L
10 INTRAVENOUS SOLUTION, PIGGYBACK (ML) INTRAVENOUS ONCE
Status: COMPLETED | OUTPATIENT
Start: 2017-05-10 | End: 2017-05-10

## 2017-05-10 RX ORDER — BISACODYL 10 MG
10 SUPPOSITORY, RECTAL RECTAL DAILY
Status: DISCONTINUED | OUTPATIENT
Start: 2017-05-11 | End: 2017-05-15 | Stop reason: HOSPADM

## 2017-05-10 RX ADMIN — SENNOSIDES AND DOCUSATE SODIUM 1 TABLET: 8.6; 5 TABLET ORAL at 11:30

## 2017-05-10 RX ADMIN — OLOPATADINE HYDROCHLORIDE 1 DROP: 1 SOLUTION/ DROPS OPHTHALMIC at 08:31

## 2017-05-10 RX ADMIN — DEXTROSE AND SODIUM CHLORIDE: 5; 900 INJECTION, SOLUTION INTRAVENOUS at 06:33

## 2017-05-10 RX ADMIN — PIPERACILLIN SODIUM,TAZOBACTAM SODIUM 3.38 G: 3; .375 INJECTION, POWDER, FOR SOLUTION INTRAVENOUS at 15:47

## 2017-05-10 RX ADMIN — LEVETIRACETAM 750 MG: 750 TABLET, FILM COATED ORAL at 08:27

## 2017-05-10 RX ADMIN — PIPERACILLIN SODIUM,TAZOBACTAM SODIUM 3.38 G: 3; .375 INJECTION, POWDER, FOR SOLUTION INTRAVENOUS at 22:17

## 2017-05-10 RX ADMIN — ONDANSETRON 4 MG: 2 INJECTION INTRAMUSCULAR; INTRAVENOUS at 05:26

## 2017-05-10 RX ADMIN — SIMETHICONE CHEW TAB 80 MG 80 MG: 80 TABLET ORAL at 05:58

## 2017-05-10 RX ADMIN — SENNOSIDES AND DOCUSATE SODIUM 3 TABLET: 8.6; 5 TABLET ORAL at 20:30

## 2017-05-10 RX ADMIN — Medication 1 CAPSULE: at 20:30

## 2017-05-10 RX ADMIN — PIPERACILLIN SODIUM,TAZOBACTAM SODIUM 3.38 G: 3; .375 INJECTION, POWDER, FOR SOLUTION INTRAVENOUS at 02:31

## 2017-05-10 RX ADMIN — OLOPATADINE HYDROCHLORIDE 1 DROP: 1 SOLUTION/ DROPS OPHTHALMIC at 20:30

## 2017-05-10 RX ADMIN — POTASSIUM CHLORIDE 10 MEQ: 14.9 INJECTION, SOLUTION, CONCENTRATE PARENTERAL at 14:07

## 2017-05-10 RX ADMIN — ACETAMINOPHEN 650 MG: 325 TABLET, FILM COATED ORAL at 04:48

## 2017-05-10 RX ADMIN — ACETAMINOPHEN 650 MG: 325 TABLET, FILM COATED ORAL at 11:26

## 2017-05-10 RX ADMIN — SENNOSIDES AND DOCUSATE SODIUM 1 TABLET: 8.6; 5 TABLET ORAL at 08:33

## 2017-05-10 RX ADMIN — HYDRALAZINE HYDROCHLORIDE 10 MG: 20 INJECTION INTRAMUSCULAR; INTRAVENOUS at 11:11

## 2017-05-10 RX ADMIN — OXYCODONE HYDROCHLORIDE 5 MG: 5 TABLET ORAL at 11:30

## 2017-05-10 RX ADMIN — LOSARTAN POTASSIUM 25 MG: 25 TABLET, FILM COATED ORAL at 17:53

## 2017-05-10 RX ADMIN — MELATONIN TAB 3 MG 3 MG: 3 TAB at 22:17

## 2017-05-10 RX ADMIN — LOSARTAN POTASSIUM 25 MG: 25 TABLET, FILM COATED ORAL at 08:50

## 2017-05-10 RX ADMIN — PIPERACILLIN SODIUM,TAZOBACTAM SODIUM 3.38 G: 3; .375 INJECTION, POWDER, FOR SOLUTION INTRAVENOUS at 09:11

## 2017-05-10 RX ADMIN — HEPARIN SODIUM 1350 UNITS/HR: 10000 INJECTION, SOLUTION INTRAVENOUS at 10:18

## 2017-05-10 RX ADMIN — PANTOPRAZOLE SODIUM 40 MG: 40 TABLET, DELAYED RELEASE ORAL at 08:27

## 2017-05-10 RX ADMIN — WARFARIN SODIUM 5 MG: 5 TABLET ORAL at 17:52

## 2017-05-10 RX ADMIN — METOPROLOL SUCCINATE 25 MG: 25 TABLET, EXTENDED RELEASE ORAL at 20:30

## 2017-05-10 RX ADMIN — METOPROLOL SUCCINATE 50 MG: 50 TABLET, EXTENDED RELEASE ORAL at 08:50

## 2017-05-10 RX ADMIN — BISACODYL 10 MG: 10 SUPPOSITORY RECTAL at 05:01

## 2017-05-10 RX ADMIN — HYDRALAZINE HYDROCHLORIDE 10 MG: 20 INJECTION INTRAMUSCULAR; INTRAVENOUS at 19:18

## 2017-05-10 RX ADMIN — LEVETIRACETAM 750 MG: 750 TABLET, FILM COATED ORAL at 20:30

## 2017-05-10 ASSESSMENT — PAIN DESCRIPTION - DESCRIPTORS
DESCRIPTORS: CONSTANT;CRAMPING
DESCRIPTORS: SHARP;STABBING;CONSTANT

## 2017-05-10 NOTE — ANESTHESIA CARE TRANSFER NOTE
Patient: Sohan Rendon    Procedure(s):  Endoscopic Retrograde Cholangiopancreatogram, Stent (Zimmon Biliary 7fr 12cm) Placement - Wound Class: II-Clean Contaminated    Diagnosis: Biliary Obstruction   Diagnosis Additional Information: No value filed.    Anesthesia Type:   No value filed.     Note:  Airway :Face Mask  Patient transferred to:PACU  Comments: VSS. Patient is resting comfortably. No complaints of pain or nausea. Son was at bedside.       Vitals: (Last set prior to Anesthesia Care Transfer)    CRNA VITALS  5/9/2017 2015 - 5/9/2017 2103      5/9/2017             Pulse: 114    SpO2: 100 %    Resp Rate (set): 10                Electronically Signed By: Abad Sanabria MD  May 9, 2017  9:03 PM

## 2017-05-10 NOTE — PROVIDER NOTIFICATION
"YAHAIRA De La Garza from [facility] Team paged to discuss volume status. Family is concerned that Mr. Rendon has too much fluid on board. They verbalize that he takes diuretics \"as needed.\" Currently has MIVF infusing at 75 ml/hr. BP is high for him. 130's/90's. PRN IV hydralazine just given for MAP > 90. Asked team to call me back to discuss.   "

## 2017-05-10 NOTE — PLAN OF CARE
D: Patient returned from ERCP at 2200. Family and  present.   I: Frequent pos-procedure VS and capnography monitoring.   A: VSS. Patient denies pain. Family supportive at bedside.   P: Continue to monitor patient. Waiting to hear back from pharmacy regarding IV keppra dose. Notify physician with changes or concerns.

## 2017-05-10 NOTE — ANESTHESIA PROCEDURE NOTES
Arterial Line Procedure Note  Staff:     Anesthesiologist:  DEVON VAN    Resident/CRNA:  MAGDIEL AYALA    Arterial line performed by resident/CRNA in presence of a teaching physician    Location: In OR Before Induction  Procedure Start/Stop Times:      5/9/2017 6:50 PM     5/9/2017 6:57 PM    patient identified, IV checked, site marked, risks and benefits discussed, informed consent, monitors and equipment checked, pre-op evaluation and at physician/surgeon's request      Correct Patient: Yes      Correct Position: Yes      Correct Site: Yes      Correct Procedure: Yes      Correct Laterality:  Yes    Site Marked:  Yes  Line Placement:     Procedure:  Arterial Line    Insertion Site:  Radial    Insertion laterality:  Right    Skin Prep: Chloraprep      Patient Prep: patient draped, mask, sterile gloves, hat and hand hygiene      Local skin infiltration:  1% lidocaine    amount (mL):  2    Ultrasound Guided?: Yes      Artery evaluated via ultrasound confirming patency.   Using realtime imaging, the artery was punctured and the needle was observed entering the artery.      A permanent image is entered into patient's chart.      Catheter size:  20 gauge, Quick cath    Cath secured with: suture      Dressing:  Tegaderm    Complications:  None obvious    Arterial waveform: Yes      IBP within 10% of NIBP: Yes

## 2017-05-10 NOTE — PROGRESS NOTES
Pt's family concerned about Pt's BP, fluid status and difficulty breathing. This writer notified Pt's primary team. Will continue to follow up with Pt's team.

## 2017-05-10 NOTE — PLAN OF CARE
"Problem: Goal Outcome Summary  Goal: Goal Outcome Summary  Outcome: No Change  /79  Pulse 92  Temp 98.4  F (36.9  C) (Axillary)  Resp 16  Ht 1.727 m (5' 8\")  Wt 81.6 kg (180 lb)  SpO2 99%  BMI 27.37 kg/m2     Neuro: A/Ox4.   Cardiac: SR with HR 70's. LVAD without complications. VSS.        Respiratory: O2 sats stable on RA. CAPNO in place from procedure last night.   GI/: Mcarthur intact with hematuria - MD Danish notified and continue to monitor for now.   Diet/appetite: Regular.   Activity:  Ax 1-2 - per family at bedside.   Pain: Pt c/o of increasing abd pain and gas pain. Suppository administered and order for enema to administer on days.   Skin: Intact, no new deficits noted.      Liberian speaking -  at bedside at beginning of shift. Refused LVAD drsg change - requesting it to be done during the day today. Heparin gtt at 13.5mL/hr. 10a recheck in for 1330. MIV at 75mL/hr.      R: Continue with POC. Notify primary team with changes.      "

## 2017-05-10 NOTE — PROVIDER NOTIFICATION
Spoke with Gold team Jaqueline Bejarano, who gave this writer the okay to connect NG tube to LIS.

## 2017-05-10 NOTE — PLAN OF CARE
"Problem: Goal Outcome Summary  Goal: Goal Outcome Summary  PT-6B- PT consult Order received, per chart review and communication with interdisciplinary care team and family, pt is dependent with bed mobility, functional transfers, and spends most days in bed or in manual w/c that he cannot propel. Pt reports \"my family does all my physical therapy\". Per pts daughter she does ROM and exercises daily with her father. Pt has manual w/c, hospital bed, colleen lift, home care and PCA services. Pt does not demonstrate skilled need for inpatient PT at this time.       "

## 2017-05-10 NOTE — BRIEF OP NOTE
[facility], [place]    Brief Operative Note    Pre-operative diagnosis: Biliary Obstruction   Post-operative diagnosis Same with hemobilia.   Procedure: Procedure(s):  Endoscopic Retrograde Cholangiopancreatogram, Stent (Zimmon Biliary 7fr 12cm) Placement - Wound Class: II-Clean Contaminated  Surgeon: Surgeon(s) and Role:     * [place] MD - Primary  Anesthesia: General   Estimated blood loss: None  Drains: None  Specimens: * No specimens in log *  Findings:   Extremely difficult UGI anatomy requring endviewer and 140 sidewiewer over wire. Gross hemobilia, draining from papilla. Blood filled bile duct and gallbladder. Baloon dilated cystic duct and placed 7F 12xm double pigtail into Gallbladder.   Complications: None.  Implants:  7F 12xm double pigtail   REC: watch for further bleeding. No further interventions likely can be offered.   Watch for any complications such as pancreatitis  Can restart liquids and anticoagulation as NO sphincterotomy done

## 2017-05-10 NOTE — CONSULTS
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 TURP 2012 in [place]) w. Obstructive LUTS on Flomax, h/o incomplete bladder emptying, and episodic gross hematuria documented at least since 2014 (per review of records). He was admitted to the Medicine service 5/3/17 for RUQ abdominal pain consistent with biliary obstruction for which he underwent ERCP with stent placement yesterday.  Urology has been consulted for intermittent gross hematuria which worsened following the GI procedure yesterday, but today seems to be improved. Patient denies bladder/flank pain. Creatinine is stable 1.65mg/dL.  UA shows /hpf, RBC >182, neg nitrites with Mcarthur in place and UCx from the same date was negative growing <10K/hpf mixed UG organisms.       We reviewed voiding symptoms - patient has been voiding normally on Flomax up until this hospitalization when he developed urinary retention requiring Mcarthur placement.  Bowels - feeling bloated, no flatus.      REVIEW OF UROLOGIC HX:   - He underwent cystoscopy on 6/2/16 w. Dr. Meier showing \"red spots on the bladder\" vs. prostate regrowth. Biopsy wasn't thought necessary at that time.   - Multiple renal imaging shows: 5/6/17 - CT AP w/o contrast - BL renal cysts, bladder wall thickening, otherwise negative. 5/3/17 - CT AP w/o contrast- BL renal cysts, tiny nonobstructing BL nephrolithiasis, unchanged mild left ureteral prominence, " mild bladder wall thickening with bladder distension. Pt has never had a CT urogram d/t CKD.    - Cytology was negative on 6/2/16.   - Last UDS on 7/9/14 showed normal capacity and compliance. Patient didn't empty because his detrusor contractility was poor. PVR was 260mL at that time.           Past Medical History:     Past Medical History:   Diagnosis Date     Acute right MCA stroke (H) 6/2013    With L sided hemiparesis      Anemia of chronic disease     Baseline Hb 8-9     BPH (benign prostatic hyperplasia)      CHF (congestive heart failure), NYHA class IV (H) 10/9/2012    s/p HeartMate II.  Was on milrinone and dobutamine prior to LVAD      CKD (chronic kidney disease)     Baseline Cr=     Clostridium difficile colitis 12/2012      Dysphagia     PEG tube placed in 2012.  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  Normal carotid duplex 3/2014.       Esophagitis 12/2012    EGD with esophagitis and multiple douenal ulcers     Fracture of femoral neck, right, closed 2/3/2015    Presumed pathologic as patient is non-weight-bearing and suffered no trauma.  Family declined operative repair during hospital stay 1/23 - 1/30/15.     Gastric and duodenal angiodysplasia with hemorrhage 6/18/2015     GERD (gastroesophageal reflux disease)      Gout      HTN (hypertension)     LDL=59 (3/29/2014)     Hyperlipaemia      Ischemic cardiomyopathy      Mitral regurgitation     s/p MVR with Carbomedics ring      Myocardial infarction (H) 1998    In [facility] without intervention      Nonsenile cataract     BE     PFO (patent foramen ovale)     s/p closure (10/9/2012)     TB lung, latent     s/p 9 months INH in 2012             Past Surgical History:     Past Surgical History:   Procedure Laterality Date     C CABG, VEIN, FIVE  2001     CATARACT IOL, RT/LT Right 11/19/2015     ENDOSCOPIC RETROGRADE CHOLANGIOPANCREATOGRAM N/A 5/9/2017     Procedure: COMBINED ENDOSCOPIC RETROGRADE CHOLANGIOPANCREATOGRAPHY, PLACE TUBE/STENT;  Endoscopic Retrograde Cholangiopancreatogram, Stent (Zimmon Biliary 7fr 12cm) Placement;  Surgeon: Cristo Grove MD;  Location: [facility]     ESOPHAGOSCOPY, GASTROSCOPY, DUODENOSCOPY (EGD), COMBINED N/A 6/10/2015    Procedure: COMBINED ESOPHAGOSCOPY, GASTROSCOPY, DUODENOSCOPY (EGD);  Surgeon: Edu Jenkins MD;  Location: [facility] GI     ESOPHAGOSCOPY, GASTROSCOPY, DUODENOSCOPY (EGD), COMBINED N/A 6/15/2015    Procedure: COMBINED ESOPHAGOSCOPY, GASTROSCOPY, DUODENOSCOPY (EGD);  Surgeon: Yury Bonner MD;  Location: [facility] OR     H INSERT ICD  2/10/2011    And pacemaker.  Not BiV     INSERT VENTRICULAR ASSIST DEVICE LEFT (HEARTMATE II)  10/9/2012     IR GASTRO JEJUNOSTOMY TUBE PLACEMENT       PHACOEMULSIFICATION CLEAR CORNEA WITH STANDARD INTRAOCULAR LENS IMPLANT Right 11/19/2015    Procedure: PHACOEMULSIFICATION CLEAR CORNEA WITH STANDARD INTRAOCULAR LENS IMPLANT;  Surgeon: Violeta Cosme MD;  Location: [facility]     REPAIR PATENT FORAMEN OVALE  10/9/2012     REPAIR VALVE MITRAL  2/9/2012    28 mm Carbomedics 2/3 ring             Social History:     Social History   Substance Use Topics     Smoking status: Former Smoker     Smokeless tobacco: Former User     Alcohol use No            Family History:     Family History   Problem Relation Age of Onset     Family History Negative No family hx of      Glaucoma No family hx of      Macular Degeneration No family hx of      CANCER No family hx of      no skin cancer            Allergies:     Allergies   Allergen Reactions     Seasonal Allergies             Medications:     Current Facility-Administered Medications   Medication     senna-docusate (SENOKOT-S;PERICOLACE) 8.6-50 MG per tablet 2-3 tablet     [START ON 5/11/2017] bisacodyl (DULCOLAX) Suppository 10 mg     lactobacillus rhamnosus (GG) (CULTURELL) capsule 1 capsule     potassium chloride 10 mEq in 100 mL intermittent  infusion with 10 mg lidocaine     tamsulosin (FLOMAX) capsule 0.4 mg     lidocaine 1 % 1 mL     lidocaine (LMX4) kit     sodium chloride (PF) 0.9% PF flush 3 mL     sodium chloride (PF) 0.9% PF flush 3 mL     sodium chloride (PF) 0.9% PF flush 3 mL     heparin  drip 25,000 units in 0.45% NaCl 250 mL (see additional administration details for dose)     heparin bolus from infusion pump     HOLD MEDICATION     ondansetron (ZOFRAN) injection 4 mg    Or     ondansetron (ZOFRAN-ODT) ODT tab 4 mg     hydrALAZINE (APRESOLINE) injection 10 mg     carboxymethylcellulose (REFRESH PLUS) 0.5 % ophthalmic solution 1 drop     piperacillin-tazobactam (ZOSYN) 3.375 g vial to attach to  mL bag     pantoprazole (PROTONIX) EC tablet 40 mg     olopatadine (PATANOL) 0.1 % ophthalmic solution 1 drop     acetaminophen (TYLENOL) tablet 650 mg     allopurinol (ZYLOPRIM) tablet 100 mg     atorvastatin (LIPITOR) tablet 10 mg     bisacodyl (DULCOLAX) EC tablet 5 mg     levETIRAcetam (KEPPRA) tablet 750 mg     loratadine (CLARITIN) tablet 10 mg     losartan (COZAAR) tablet 25 mg     melatonin tablet 3 mg     simethicone (MYLICON) chewable tablet 80 mg     thiamine tablet 100 mg     naloxone (NARCAN) injection 0.1-0.4 mg     lidocaine 1 % 1 mL     lidocaine (LMX4) kit     sodium chloride (PF) 0.9% PF flush 3 mL     sodium chloride (PF) 0.9% PF flush 3 mL     Patient is already receiving anticoagulation with heparin, enoxaparin (LOVENOX), warfarin (COUMADIN)  or other anticoagulant medication     metoprolol (TOPROL-XL) 24 hr tablet 50 mg     metoprolol (TOPROL-XL) 24 hr tablet 25 mg     oxyCODONE (ROXICODONE) IR tablet 5 mg     Facility-Administered Medications Ordered in Other Encounters   Medication     oxyCODONE (ROXICODONE) 5 MG immediate release tablet             Review of Systems:    ROS: See HPI for pertinent details.  Remainder of 10-point ROS negative.  As above in HPI           Physical Exam:   VS:  T: 97.6    HR: 92    BP:  133/101    RR: 18   GEN:  NAD.  Pleasant.  HEENT:  Sclerae anicteric.  Conjunctivae pink.  Moist mucous membranes  NECK:  Supple.  No lymphadenopathy.  LUNGS: Non-labored breathing.  BACK:  No costoverterbral tenderness.  ABD:  Soft.  NT.  + Distended  No rebound or guarding.  + Right abd LVAD dressed and nontender.  No masses.    :  Phallus circumcised with Mcarthur in place- no trauma.  Meatus patent. Normal penile shaft without plaques.  Testicles descended bilaterally, no nodules or tenderness.  Epididymes non-tender. No inguinal hernias.  JES:  Deferred  EXT:  Warm, well perfused.  Trace lower extremity edema bilaterally  SKIN:  Warm.  Dry.  No rashes.  NEURO:  CN grossly intact.  Normal gait    URINE: Clear yellow in tubing (although the urine in the bag is pink without clots)          Data:   All laboratory data reviewed:    No results found for: PSA    Recent Labs  Lab 05/10/17  0417 05/09/17  0632 05/08/17  1102 05/08/17  0553   WBC 8.4 14.2* 15.7* 14.2*   HGB 10.6* 11.0* 12.7* 10.9*    230 224 228       Recent Labs  Lab 05/10/17  0417 05/09/17  0632 05/08/17  0553 05/07/17  0725 05/05/17  1023    138 138 139 134   POTASSIUM 3.2* 3.4 3.5 3.5 3.5   CHLORIDE 112* 109 110* 110* 108   CO2 20 18* 18* 19* 17*   BUN 15 15 18 20 16   CR 1.65* 1.55* 1.63* 1.75* 1.65*   * 192* 144* 151* 239*   JSOÉ 8.2* 8.2* 8.4* 8.3* 8.0*   MAG  --   --   --   --  1.8       Recent Labs  Lab 05/06/17  1038   COLOR Light Red   APPEARANCE Clear   URINEGLC Negative   URINEBILI Negative   URINEKETONE Negative   SG 1.012   URINEPH 7.0   PROTEIN 30*   NITRITE Negative   LEUKEST Large*   RBCU >182*   WBCU 163*     Results for orders placed or performed during the hospital encounter of 05/02/17   Urine Culture Aerobic Bacterial   Result Value Ref Range    Specimen Description Catheterized Urine     Culture Micro       <10,000 colonies/mL urogenital rashid Susceptibility testing not routinely done    Micro Report Status  FINAL 05/07/2017    Results for orders placed or performed during the hospital encounter of 10/07/16   Urine Culture Aerobic Bacterial   Result Value Ref Range    Specimen Description Midstream Urine     Special Requests Specimen received in preservative     Culture Micro (A)      >100,000 colonies/mL Coagulase negative Staphylococcus    Micro Report Status FINAL 10/09/2016     Organism:       >100,000 colonies/mL Coagulase negative Staphylococcus       Susceptibility    >100,000 colonies/ml coagulase negative staphylococcus (chano) -  (no method available)     CIPROFLOXACIN >=8 Resistant  ug/mL     GENTAMICIN <=0.5 Susceptible  ug/mL     LEVOFLOXACIN >=8 Resistant  ug/mL     NITROFURANTOIN <=16 Susceptible  ug/mL     OXACILLIN >=4 Resistant  ug/mL     PENICILLIN >=0.5 Resistant  ug/mL     TETRACYCLINE >=16 Resistant  ug/mL     VANCOMYCIN 1 Susceptible  ug/mL       All pertinent imaging reviewed:  EXAMINATION: CT CHEST ABDOMEN PELVIS W/O CONTRAST 5/6/2017 4:11 PM      CLINICAL HISTORY: concern for development of infection in abdomen or  by drive line     COMPARISON: 5/3/2017      PROCEDURE COMMENTS: CT of the chest, abdomen, and pelvis was performed  without intravenous and oral contrast. Axial MIP images of the chest,  and coronal and sagittal reformatted images of the chest, abdomen, and  pelvis obtained.     FINDINGS:   Support devices: Left chest implanted cardiac defibrillator, leads  terminating in the right atrium and right ventricle. Left ventricular  assist device. No associated fat stranding, device failure, or acute  thrombosis. Sternotomy wires.     Chest:  Mucosal debris within the mid trachea. The peripheral tracheobronchial  tree is clear. Consolidative opacities of the lower lobes, right  greater the left with air bronchograms. 5 x 6 mm groundglass pulmonary  nodule within the lateral segment of the right middle lobe (series 2,  image 59). This previously measured 8 x 6 mm. Mild dilatation of the  main  pulmonary artery, measuring up to 3.4 cm in diameter is  (previously measuring 3.0 cm).     Low-attenuation nodule which occupies the majority of the right lobe  of the thyroid gland, measuring 1.8 x 2.2 cm. No coarse  calcifications. There are no abnormally sized axillary, hilar, or  mediastinal lymph nodes.     Significant cardiomegaly. Moderate atherosclerotic calcifications of  the coronary arteries.     Abdomen/pelvis:  Gallbladder is significantly distended without radiopaque stones or  adjacent fat stranding. Extensive beam hardening artifact from left  ventricular assist device interferes with optimal visualization.  Significant fatty atrophy of the pancreas. Significant interval  improvement in fat stranding adjacent to the second portion of the  duodenum. Renal cortical irregularity and atrophy. Multiple  cortical-based renal cysts, the largest which involves the interpolar  aspect the left kidney measures 4.1 x 4.3 cm. No soft tissue  components or internal calcifications.     Atherosclerotic calcifications of the abdominal and pelvic arterial  vasculature. No definitive aneurysmal dilatation. Fat-containing left  inguinal hernia. Mcarthur catheter within the decompressed urinary  bladder. Thickening of the urinary bladder, measuring up to 1.0 cm in  diameter. No adjacent fat stranding. Antidependent air within the  urinary bladder. Again noted is mucosal thickening of the rectum,  measuring up to 9 mm in diameter.      The liver, spleen, and appendix are normal in appearance.  The adrenal glands are normal in appearance.     There are no abnormally dilated or thickened loops of small bowel or  colon.     There is no free air or free fluid. There are no abnormally sized  lymph nodes.     Bones:   Nondisplaced right lateral third clavicle fracture, unchanged from  prior study. 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  No significant change. Lower lumbar spine facet  osteoarthropathy.  Additional degenerative changes of the spine, including anterior  osteophytes, particularly in the lower lumbar region.         IMPRESSION:  1. Although limited by lack of intravenous contrast, no definitive CT  evidence of infection associated with the left ventricular assist  device.  2. No significant change in opacities of the lower lobes, right  greater the left, which may represent atelectasis with possible  superimposed pneumonia.  3. Persistent thickening of the rectum, which may be due to stool  burden and or proctitis.   5. Moderate cardiomegaly. Mild dilatation of the main pulmonary  artery, measuring up to 3.4 cm in diameter. The latter finding may  represent some degree of underlying pulmonary arterial hypertension.  6. Near-complete resolution of previously demonstrated fat stranding  adjacent to the second portion of the duodenum.  7. Significant distention of the gallbladder, which may be due to  fasting state. No radiopaque gallstones noted. No adjacent fat  stranding or CT findings to suggest acute cholecystitis.  8. Hypoattenuating right thyroid nodule, which occupies the majority  of the gland. 9. Groundglass pulmonary nodule within the right middle  lobe, which may be infectious. No significant change from prior study  5/3/2017.      EXAMINATION: CT CHEST ABDOMEN PELVIS W/O CONTRAST, 5/3/2017 11:17 AM  TECHNIQUE: Helical CT images from the thoracic inlet through the  symphysis pubis were obtained without contrast.      COMPARISON: CT abdomen pelvis dated 4/29/2017 and CT chest dated  8/14/2014 and 7/19/2014     HISTORY: elevated WBC and inflammatory markers of unclear etiology.  new productive cough     FINDINGS:     Chest: Stable positioning of LVAD. Cardiomegaly is noted. Cardiac  pacemaker device is noted in the left chest wall with leads in place.  Atherosclerotic calcification of thoracic aorta without aneurysm. Main  Pulmonary arteries are normal in caliber.  Central  tracheobronchial tree it is patent. There is no pericardial  effusion. Small bilateral pleural effusions are noted. Right basal  atelectasis/consolidation is noted. Left basilar atelectasis is also  noted.     Multiple scattered bilateral pulmonary nodules are again identified.  For example: Stable 5 mm nodule within the anterior segment of the  right upper lobe (image 57, series 2).  Stable 3 mm subpleural nodule within the lateral aspect of the right  upper lobe (image 56, series 2).  Stable 5 mm nodule within the right middle lobe (image 68, series 2).  No new suspicious pulmonary nodules. No other focal pulmonary opacity.  1.5 cm pneumatocele or cyst at the right lung base.     Abdomen and pelvis:   Limited evaluation of upper abdomen due to streak artifacts from the  right. There is mild inflammatory fat stranding in the right upper  abdomen, surrounding the second portion of the duodenum and extending  along the anterior pararenal fascia and posterior to the ascending  colon (image 131, series 4). No localized fluid collection or free air  is identified. An adjacent head is slight uncinate process of the  pancreas demonstrates fatty infiltration without obvious inflammatory  changes.  Small amount of fluid is seen along the tip of the appendix, with  decreased inflammatory changes and dilation of the appendix since  prior CT dated 2/11/2017. Remainder of the appendix has a normal  appearance.  Colonic diverticulosis is noted without focal diverticulitis. No other  areas of bowel wall thickening or bowel obstruction.  Unchanged lobulated appearance of the spleen, likely related to prior  infarctions. No focal liver lesion on this noncontrast study.  Gallbladder is distended without wall thickening or calculi or  pericholecystic fluid. Pancreas is atrophic. Both adrenal glands are  unremarkable. Multiple exophytic cysts are again noted in the kidneys  bilaterally, not significantly changed since prior study.  Unchanged  1.3 cm mildly hyperdense exophytic lesion in the upper pole of the  right kidney since CT dated 9/16/2016, likely representing a  hemorrhagic/proteinaceous cysts.  Tiny nonobstructing calculi are noted in the kidneys bilaterally.  Unchanged mild left renal pelvocaliectasis and mild prominence of left  ureter. Urinary bladder is distended with mild wall thickening,  unchanged since prior study.  Mild hypertrophia median lobe of the prostate. Seminal vesicles appear  unremarkable.  No intra-abdominal or pelvic lymphadenopathy. Atherosclerotic  calcification of the abdominal aorta and its branches is noted without  aneurysm.     Bones and soft tissues: Old fracture of the right femoral neck is  again identified with associated deformity. Multilevel degenerative  changes are noted in the spine. No suspicious osseous lesions. No  significant change in partially visualized periosteal reaction along  the right proximal femur. No suspicious or aggressive osseous lesions.         IMPRESSION:   1. Inflammatory fat stranding surrounding the second portion of  duodenum with extension along the right anterior pararenal fascia,  likely representing duodenitis. No localized fluid collections or  free air to suggest perforation. Fatty infiltration of the adjacent  head/uncinate process of pancreas without obvious inflammatory  changes.  2. Small amount of fluid and fat stranding surrounding the tip of the  appendix, however significantly improved since CT dated 2/11/2017.  Remainder of appendix has a normal appearance.  3. Mild left renal pelvocaliectasis and left hydroureter, unchanged  since prior study. Tiny nonobstructing calculi within the kidneys  bilaterally. No ureteric calculi.  4. Stable scattered sub-5 mm pulmonary nodules in the right lung since  CT dated 7/19/2014.  5. Small bilateral pleural effusions. Right basal  atelectasis/consolidation. Superimposed infection cannot be excluded.         Impression and  Plan:   Impression / Plan:   Sohan Rendon is a 67 year old male with LVAD on chronic warfarin and several year history of intermittent painless gross hematuria.  Last cysto was 6/2016 showing nonspecific erythematous patches with negative cytology.     Also has h/o hypotonic bladder and incomplete emptying.  With this hospitalization is in retention, which may be related to narcotics or constipation.      - Based on Dr. Meier's recommendation in 6/2016 will obtain urine cytology/FISH.   - Warfarin may be restarted from standpoint of urology, but the expectation is that intermittent gross hematuria will persist, much as it has in the past.  Contact urology with thickening red urine or concerns for falling hemaglobin due to  blood loss  - Voiding trial per the primary service  - Continue Flomax  - Will order finasteride (which will help with outlet obstruction and prostate bleeding) to be continued after discharge.   - Will reschedule with Dr. Meier given recurrent/ intermittent gross hematuria to see if repeat cystoscopy would be warranted.     Urology will sign off but please call with concerns.     Thank you for the opportunity to participate in the care of Sohan Rendon.     Mecca Gardner PA-C  Urology Physician Assistant  Personal Pager: [phone]    Please call Job Code:   [phone] to reach the Urology resident or PA on call - Weekdays  [phone] to reach the Urology resident or PA on call - Weeknights and weekends

## 2017-05-10 NOTE — PROGRESS NOTES
"  GASTROENTEROLOGY PROGRESS NOTE    Date of Admission: 5/2/2017  Reason for Admission: abd pain      Assessment:  67 year old male with a history of chronic systolic heart failure s/p LVAD on Destination Therapy who is admitted to the hospital 5/2 for worsening RUQ abdominal pain. Initially, RUQ US showing GB wall thickness of 3.5mm along with duodenitis, surgery consulted did not think was acute cholecystitis. Subsequently had increasing leukocytosis and inflammatory markers prompting repeat imaging which showed resolved duodenitis but repeat RUQ US showing 7mm thicken GB wall.     Liver tests rising therefore patient under went ERCP with transpapillary cystic duct stenting to drain GB on 5/9. There was gross hemobilia - blood draining from bile duct and GB. No sphincterotomy done, 7F x 12cm DPT GB placed. T bili slightly increased today, abdomen appears more distended, consider ileus vs SBO. No melena or hematemesis noted.      Recommendations:  Obtain AXR today  Consider NG tube for decompression if he has ileus  OK to continue AC at this time (no sphincterotomy done)  Continue bowel regimen  Continue NPO status until after AXR  Pain meds, antiemetics per primary team  Discussed with primary team, Holli Geiger PA-C     Pt seen and care plan discussed with Dr. Grove GI staff physician.     Violeta Fleming PA-C  Advanced Endoscopy/Pancreaticobiliary PATRICIA  [facility]  Pager *[phone]  _______________________________________________________________      Subjective: Patient seen and examined at 1150. Patient reports he is having more pain today. No bowel movement. RN gave enema this AM with little return. Has been having heartburn symptoms.    ROS:   4 pt ROS negative unless noted in subjective.     Objective:  Blood pressure (!) 131/97, pulse 92, temperature 97.6  F (36.4  C), temperature source Axillary, resp. rate 14, height 1.727 m (5' 8\"), weight 81.6 kg (180 lb), SpO2 99 %.  Gen: " Mild distress  HEENT: Mild icterus  Resp: Clear bilaterally anteriorly  CV: RRR, LVAD audible  Abd: distended (more than yesterday), hypoactive BS, no peritoneal signs  Ext: LE edema      PROCEDURES:  ERCP 5/9 - Dr. Grove  Extremely difficult UGI anatomy requring endviewer and 140 sidewiewer over wire. Gross hemobilia, draining from papilla. Blood filled bile duct and gallbladder. Baloon dilated cystic duct and placed 7F 12xm double pigtail into gallbladder.     LABS:  BMP  Recent Labs  Lab 05/10/17  0417 05/09/17  0632 05/08/17  0553 05/07/17  0725    138 138 139   POTASSIUM 3.2* 3.4 3.5 3.5   CHLORIDE 112* 109 110* 110*   JOSÉ 8.2* 8.2* 8.4* 8.3*   CO2 20 18* 18* 19*   BUN 15 15 18 20   CR 1.65* 1.55* 1.63* 1.75*   * 192* 144* 151*     CBC  Recent Labs  Lab 05/10/17  0417 05/09/17  0632 05/08/17  1102 05/08/17  0553   WBC 8.4 14.2* 15.7* 14.2*   RBC 3.51* 3.52* 4.07* 3.53*   HGB 10.6* 11.0* 12.7* 10.9*   HCT 32.6* 32.3* 37.4* 33.0*   MCV 93 92 92 94   MCH 30.2 31.3 31.2 30.9   MCHC 32.5 34.1 34.0 33.0   RDW 16.7* 16.5* 16.4* 16.4*    230 224 228     INR  Recent Labs  Lab 05/10/17  0417 05/09/17  0632 05/08/17  0553 05/07/17  0725   INR 1.49* 1.69* 1.94* 2.04*     LFTs  Recent Labs  Lab 05/10/17  0417 05/09/17  0632 05/08/17  0553 05/07/17  0725   ALKPHOS 352* 360* 228* 164*   * 224* 77* 43   * 110* 38 31   BILITOTAL 5.6* 5.0* 2.0* 1.6*   PROTTOTAL 6.2* 6.0* 6.5* 6.4*   ALBUMIN 2.1* 2.0* 2.3* 2.3*      PANC  Recent Labs  Lab 05/10/17  0417   LIPASE 98         IMAGING:  EXAMINATION: US ABDOMEN LIMITED, 5/7/2017 11:58 AM           IMPRESSION:   Increased gallbladder wall thickening of 7.4 mm, previously 3.5, and  new pericholecystic fluid, with redemonstrated gallbladder sludge.  Differential remains acalculus cholecystitis versus changes secondary  to hepatic dysfunction.         EXAMINATION: CT CHEST ABDOMEN PELVIS W/O CONTRAST 5/6/2017 4:11 PM       IMPRESSION:  1. Although  limited by lack of intravenous contrast, no definitive CT  evidence of infection associated with the left ventricular assist  device.  2. No significant change in opacities of the lower lobes, right  greater the left, which may represent atelectasis with possible  superimposed pneumonia.  3. Persistent thickening of the rectum, which may be due to stool  burden and or proctitis.   5. Moderate cardiomegaly. Mild dilatation of the main pulmonary  artery, measuring up to 3.4 cm in diameter. The latter finding may  represent some degree of underlying pulmonary arterial hypertension.  6. Near-complete resolution of previously demonstrated fat stranding  adjacent to the second portion of the duodenum.  7. Significant distention of the gallbladder, which may be due to  fasting state. No radiopaque gallstones noted. No adjacent fat  stranding or CT findings to suggest acute cholecystitis.  8. Hypoattenuating right thyroid nodule, which occupies the majority  of the gland. 9. Groundglass pulmonary nodule within the right middle  lobe, which may be infectious. No significant change from prior study  5/3/2017.

## 2017-05-10 NOTE — PROVIDER NOTIFICATION
Spoke with Holli from primary team regarding persistently elevated BP's, last electronic reading = 133/101, MAP = 112. PRN hydralazine given at 11:11 am, not due yet until 1700. Paged to inquire if further intervention is needed at this time. Per YAHAIRA De La Garza from Gold team - no interventions necessary at this time. Cardiology consulted, will wait for their recommendations. Also informed Holli abdominal xray is done, she is waiting for Radiology to read. I also paged GI team to inform xray is complete.

## 2017-05-10 NOTE — PROVIDER NOTIFICATION
There is an order for Cozaar to be given. This writer discussed with Holli at bedside, she would like the NG tube to be placed first. Then connected to suction for a bit. Then okay to give med but clamp afterwards. This writer verbalized understanding.    Also discussed capnography readings. End tidal Co2's have been running 25-28 but O2 sats are 98-99%. RR remains within normal limits, unlabored. Justice denies shortness of breath. Holli would like to see if any changes after having NG tube to suction subsequently decreasing abdominal distention, allowing for more lung expansion.

## 2017-05-10 NOTE — PROGRESS NOTES
[facility], [place]    Internal Medicine Progress Note - Gold Service      Assessment & Plan   Sohan Rendon is a 67 year old male with a history of systolic CHF secondary to ischemic cardiomyopathy s/p HM II LVAD (DT) and ICD (2011) complicated by recurrent hemolysis and pump thrombus, multiple CVAs with residual left-sided weakness, PFO s/p closure in 10/2012, mitral regurgitation s/p MVR in 2/2012, CKD, hematuria secondary to anti-coagulation, latent TB s/p INH treatment, seizure, HTN, gout, chronic anemia, BPH, esophagitis, GERD, PUD, C.diff colitis, and recent admission (2/11-2/15) for appendicitis which was managed conservatively who was admitted on 5/2/2017 with right-sided abdominal pain secondary to acalculous cholecystitis.    ADDENDUM: AXR with ileus vs small bowel obstruction per preliminary radiology read. Final radiology read states there is non-obstructive bowel gas pattern. NG placed by RN staff based on results of prelim radiology read and patient's symptoms of worsening abdominal distention, pain, and nausea earlier this AM. Keep NG to LIS, but OK for medications by mouth with NG clamping for 30 minutes after medications. Maintain NPO status. Defer further IVF given concerns for mild fluid overload, but will defer Lasix given NPO status with GI losses via NG tube. Hematuria resolving w/o intervention. Coumadin to be resumed tonight as OK per Urology and GI. Increase Losartan to 25 mg BID per Cardiology.     Acute Acalculous Cholecystitis - Presented with right-sided abdominal pain. Abdominal US on admission (5/3) showed gallbladder sludge with non-specific mild gallbladder wall thickening w/o cholelithiasis. Evaluated by General Surgery (5/3) who did not feel exam, imaging, or history was consistent with cholecystitis. Rising CRP, Procalcitonin, and WBC prompted repeat RUQ US (5/7) which showed gallbladder wall had doubled in size (from 3.5 mm to 7.4 mm) with new  pericholecystic fluid and demonstrated gallbladder sludge consistent with acalculous cholecystitis. LFTs wnl on admission (5/2) with acute increase on 5/7 and progressive worsening since. TBili 5.6 (5.0), Alk Phos 352 (360),  (110),  (224) today. Underwent ERCP on 5/9 with findings of gross hemobilia s/p balloon dilation of cystic duct and gallbladder stenting. Afebrile. Leukocytosis resolved today with WBC 8.4 (peak of 19.0 this admit). No lactic acidosis. Remainder of infectious work-up negative to date including Blood Cultures X 4, Sputum Cx, Urine Culture, C.diff PCR, and Enteric XOCHILT.  - Continue IV Zosyn (started 5/7). Consider transition to PO antibiotics vs discontinuation of antibiotics on 5/11 if remains clinically improved and OK with GI.  - No NSAIDs  - Zofran prn for n/v  - Tylenol 650 mg q 6h prn and Oxycodone 5 mg q 4h prn for pain  - Trend CMP and CBC on 5/11  - Appreciate GI following    Recurrent Generalized Abdominal Pain and Distention - Developed ~5/8. AXR (5/8) showed non-obstructive bowel pattern. Significantly improved 5/9 after passing large amount of flatus and having BM. Recurrent overnight from 5/9-5/10 and improved this morning. GI concerned for possible post-op ileus in setting of ERCP 5/9. Received tap water enema this morning without results.  - Repeat AXR. NG placement if evidence of ileus or obstruction.  - NPO until AXR resulted. If no evidence of obstruction or ileus, will start clear liquids.  - Increase Senna-Docusate to 2-3 tabs BID (hold for loose stools)  - Daily Dulcolax suppositories  - Start Culturell 1 capsule BID  - Tap water enemas prn if no BM in 24 hours  - Simethicone prn for abdominal cramping    Chronic Anticoagulation secondary to LVAD - INR goal 1.8-2.5 in setting of recurrent hematuria. INR supra-therapeutic on admission with peak of 4.19 on 5/4; likely due to decreased PO intake. Coumadin held 5/4-5/5, resumed 5/6-5/7, now on hold since 5/8 due to  ERCP on 5/9. Bridging with Heparin gtt while Coumadin on hold. Received 2 mg IV Vitamin K on 5/7 and 1 u FFP on 5/9 for INR reversal prior to ERCP. Heparin gtt held for 6 hours prior to ERCP on 5/9, resumed ~2200 last night. INR 1.49 today.  - Coumadin to remain on hold until Urology evaluation. Will resume tonight if OK from Urology perspective. No contraindication to resumption from GI standpoint.   - Continue Heparin gtt until INR therapeutic following Coumadin resumption    Chronic Systolic Heart Failure secondary to Ischemic Cardiomyopathy s/p ICD and HM II LVAD (10/9/2012) - As destination therapy (DT). Stage D, NYHA Class III. 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 Most recent weight 81.6 kg (180 lbs) on 5/2. Appears mildly hypervolemic on exam (BLE edema, supplemental O2 requirements of 2L via NC, elevated MAPs). Not on any outpatient diuretics.  - Cardiology consult to assist with volume management.   - Discontinue IVF  - Anticoagulation as above  - LVAD settings: Flow ~4.5, PI ~5.7-6.0, Speed 8800, Power ~5.5. No alarms over past 24 hours.  - Continue Losartan and Toprol-XL per home dosing.   - Not on ASA due to recurrent hematuria  - Not on aldosterone antagonist due to renal dysfunction  - Daily weights with I/Os.   - Continue home Atorvastatin  - Daily sterile dressing changes to driveline site. No evidence of driveline infection on exam or imaging.    Recurrent Hematuria - Since June 2016. Previously attributed to UTI, BPH, and neurogenic bladder in setting of anticoagulation. INR goal decreased to 1.8-2.5 and ASA held as a result. Developed recurrent hematuria overnight from 5/9-5/10. Hgb stable at 10.6 today. Mcarthur bag with gross hematuria without  clots appreciated, Mcarthur tubing with clear yellow urine which is reassuring.  - Urology Consult  - Continue Heparin gtt  - Defer resumption of home Coumadin until Urology evaluation - will resume tonight if OK from Urologic standpoint.  - Trend daily CBC. Transfuse for Hgb <8.    HTN - MAP goal 65-85. MAPs in 90s-100s over past 24 hours, though difficult to determine if these were obtained via Doppler or BP cuff. Pain and mild hypervolemia may be contributing.  - Cardiology consult to assist with volume and BP management  - Continue home regimen of Cozaar 25 mg QD and Toprol-XL 50 mg q AM/25 mg q PM for now. Hold for MAP <60.   - Hydralazine PRN for MAP >90 mmHG  - Avoid BP checks on left side given hemiparesis  - Monitor BP via Doppler rather than BP cuff per discussion with LVAD coordinator and Cards II team    Acute on Chronic Normocytic Anemia - Baseline Hgb ~13-14 secondary to iron deficiency and anemia of chronic disease. Hgb 13.2 on admission. Hgb decreased to ~10-11 this admission, last 10.6 on 5/10. Evidence of gross hemobilia on ERCP which may be contributing. No s/s of GI bleeding, though GI notes melena would not be unexpected given findings of hemobilia on ERCP with gallbladder stenting. Developed new onset hematuria overnight from 5/9-5/10 with stable Hgb since symptom onset.  - Trend daily CBC. Transfuse to maintain Hgb >8.  - Resume home iron supplements once infection resolves.     Urinary Retention in setting of BPH - S/p TURP in 2014. On Flomax 0.4 mg PTA. Developed urinary retention this hospitalization s/p Mcarthur placement on 5/5.   - Hold home Flomax while Mcarthur in place  - Continue indwelling Mcarthur. Follow up with Urology 1 week after discharge to assess for Mcarthur removal.    Hypokalemia - K 3.2 today. Likely due to NPO status. Give 10 mEq IV KCl given NPO status. Repeat Potassium 2 hours after IV replacement with further replacement needs to be determined at that time. Avoiding replacement  protocol in setting of CKD.    Chronic Stable Medical Conditions and Resolved Hospital Issues  Duodenitis - Admission CT Chest/Abd/Pelvis (5/5) with inflammatory fat stranding around second portion of duodenum with extension along the right anterior pararenal fascia c/w duodenitis; no localized fluid collections or free air to suggest perforation. Repeat CT (5/6) showed near complete resolution. H. Pylori stool antigen, C.diff PCR, and Enteric XOCHILT negative on 5/7. Continue Protonix 40 mg QD.  Multiple CVAs - Residual left-sided hemiparesis. Lift dependent at baseline. Continue home Atorvastatin. Defer PT/OT given patient at baseline level of mobility.  Seizure Disorder - No recent seizures. Continue PTA Keppra.  CKD - Baseline Cr ~1.6-1.8. Cr stable at 1.65 today. Adequate UOP. Avoid nephrotoxins. Renally adjust medications. Trend daily BMP.  NAGMA - From 5/3-5/9. Likely secondary to CKD. Clinical picture not c/w RTA. No GI losses. Resolved as of 5/10 with Bicarb 20 today. Trend daily BMP.   Gout - Stable without evidence of flare. Continue PTA Allopurinol.  Seasonal Allergies - Stable. Continue Claritin daily per PTA regimen.    Diet: NPO until AXR returns.   Fluids: Discontinue IVF given s/s of volume overload.  DVT Prophylaxis: Heparin gtt, Protonix.   Code Status: Full Code    Disposition Plan   Expected discharge in 3-5 days pending medical stability (INR therapeutic on Coumadin, having regular BMs, tolerating regular diet, improvement in hematuria). Recommended to home with resumption of home PCA hours (16 hours/day) and home health care.      Entered: Kelin Geiger 05/10/2017, 12:20 PM    The patient's care was discussed with the Dr. Peters (attending MD), Bedside Nurse and LVAD coordinator.    Holli Geiger PA-C  Internal Medicine Hospitalist Service  [facility]  Pager: [phone]  Please see sticky note for cross cover information    Interval History   Complaining of increased  abdominal pain and distention overnight, both of which are improved this AM. No BM yet today, but just received enema. Mcarthur bag with gross hematuria, though tubing has clear yellow urine. Afebrile. Denies any dizziness, nausea, vomiting, chest pain, or SOB.     Data reviewed today: I reviewed all medications, new labs and imaging results over the last 24 hours. I personally reviewed CMP, CBC, Lipase, INR, and Heparin 10A level.    Physical Exam   Vital Signs: Temp: 97.6  F (36.4  C) Temp src: Axillary BP: (!) 131/97   Heart Rate: 73 Resp: 19 SpO2: 97 % O2 Device: None (Room air) Oxygen Delivery: 2 LPM  Weight: 180 lbs 0 oz  General Appearance: Chronically ill male who is awake, sitting up in bed, non-toxic appearing, and in NAD. Daughter at bedside.   HEENT: NC/AT. Anicteric sclera. Mucous membranes moist.  Cardiovascular: LVAD hum. S1,S2.   Respiratory: Normal effort on 2L O2 via NC. No wheezes, rhonchi, or crackles.  GI: Soft and distended. Bowel sounds present. Tender to palpation of RUQ.  Extremities: Trace to 1+ pitting edema of BLE. Warm and well perfused.  Skin: LVAD driveline with dressing in place which is c/d/i. No rashes or jaundice.      Data   Medications     HEParin 1,350 Units/hr (05/10/17 1018)     - MEDICATION INSTRUCTIONS -         potassium chloride  40 mEq Oral Once     senna-docusate  1 tablet Oral BID     sodium chloride (PF)  3 mL Intracatheter Q8H     piperacillin-tazobactam  3.375 g Intravenous Q6H     pantoprazole  40 mg Oral QAM AC     olopatadine  1 drop Both Eyes BID     allopurinol  100 mg Oral Daily     atorvastatin  10 mg Oral Daily     levETIRAcetam  750 mg Oral BID     loratadine  10 mg Oral Daily     losartan  25 mg Oral Daily     melatonin  3 mg Oral At Bedtime     thiamine  100 mg Oral Daily     sodium chloride (PF)  3 mL Intracatheter Q8H     metoprolol  50 mg Oral Daily     metoprolol  25 mg Oral QPM     Data     Recent Labs  Lab 05/10/17  0417 05/09/17  0632 05/08/17  1102  05/08/17  0553   WBC 8.4 14.2* 15.7* 14.2*   HGB 10.6* 11.0* 12.7* 10.9*   MCV 93 92 92 94    230 224 228   INR 1.49* 1.69*  --  1.94*    138  --  138   POTASSIUM 3.2* 3.4  --  3.5   CHLORIDE 112* 109  --  110*   CO2 20 18*  --  18*   BUN 15 15  --  18   CR 1.65* 1.55*  --  1.63*   ANIONGAP 9 10  --  10   JOSÉ 8.2* 8.2*  --  8.4*   * 192*  --  144*   ALBUMIN 2.1* 2.0*  --  2.3*   PROTTOTAL 6.2* 6.0*  --  6.5*   BILITOTAL 5.6* 5.0*  --  2.0*   ALKPHOS 352* 360*  --  228*   * 110*  --  38   * 224*  --  77*   LIPASE 98  --   --   --      Recent Results (from the past 24 hour(s))   XR Surgery KELVIN G/T 5 Min Fluoro w Stills    Narrative    This exam was marked as non-reportable because it will not be read by a   radiologist or a [place] non-radiologist provider.               Attestation:  I, Gabe Peters, saw and evaluated Sohan Rendon as part of a shared visit.  I have reviewed and discussed with the advanced practice provider their history, physical and plan.      I have reviewed today's care team notes, Medications, Vital Signs and Labs.     My key history or physical exam findings: Had some worsened abd pain this am but is now better. No stool since yd, so enema this am. Also some distention. Had some nausea overnight but no vomiting.     Physical exam:  General: Alert and oriented, well nourished, in NAD  Chest/Pulmonary: breathing comfortably, no tachypnea   Cardiovascular: trace LE edema. Good perfusion.  Abdomen: + BS, distended, tender.   Skin: no diaphoresis, skin color normal     Key management decisions made by me:   Xray initially read as developing vs SBO/ileus so NG placed this pm. However, later read as non-obstructive BG pattern. Will keep to LIS overnight and reassess.   Continue heparin gtt. Restart oral anticoagulation today.     Tejas Trappey, MD   of Medicine  Med-Peds Hospitalist  Pager [phone]     Date of service: 05/10/17

## 2017-05-10 NOTE — CONSULTS
[facility]   Cardiology II Consult/ Advanced Heart Failure  Daily Progress Note  Date of Service: 5/10/2017      Patient: Sohan Rendon  MRN: [id number]  Admission Date: 5/2/2017  Hospital Day # 6    Assessment and Plan: Mr. 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 He presents to ED for abdominal pain secondary to duodenitis. Cardiology consult requested per IM for LVAD management and preoperative clearance.     ICM s/p LVAD HM II. ICD 2011. 2012 as DT. 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   Stage D, NYHA Class III. Hypertensive today with variable MAPs in the 80s-110s range; may be related to abdominal pain but there has been inconsistent measurement of pressures with both cuff and doppler. Recommend continuing IV Hydralazine PRN for MAP > 90 mmHg, and will increase Losartan to 25mg BID for better blood pressure control. Appears mildly hypervolemic on exam, recommend giving Lasix IV 20mg x 1 if potassium level is improved upon recheck at 1730 following replacement (was 3.2 this morning).  ACEi/ARB: Increase Losartan to 25 mg po BID.   BB: Continue Toprol XL 50 mg in AM and 25 mg at hs.   Aldosterone antagonist: contraindicated due to renal dysfunction.   SCD prophylaxis ICD  Fluid status: mildly hypervolemic, would given Lasix 20mg IV x 1 after potassium level is rechecked.  MAP: 80s-100s. PRN Hydralazine as above for MAP > 90 mmHg.  LDH: 548 on 5/6, baseline in the 700s-800s.   Anticoagulation: Patient was  "reversed for GI procedure yesterday, and has been bridged with heparin post-procedure. INR 1.49 today, goal 1.8-2.5. Primary team is resuming warfarin this evening.  Antiplatelet: ASA remains on hold given recurrent hemolysis/hematuria.   ================================================================    Interval History/ROS: Mr. Rendon says that his procedure went well yesterday, but he continues to have abdominal pain related to the extra air in his abdomen. Denies any SOB, edema, chest pain, dizziness, or palpitations.    Last 24 hr care team notes reviewed.   ROS:  4 point ROS including Respiratory, CV, GI and , other than that noted in the HPI, is negative.     Medications: Reviewed in EPIC.     Physical Exam:   BP (!) 131/97 (BP Location: Right arm)  Pulse 92  Temp 97.6  F (36.4  C) (Axillary)  Resp 14  Ht 1.727 m (5' 8\")  Wt 81.6 kg (180 lb)  SpO2 99%  BMI 27.37 kg/m2  GENERAL: Appears alert and oriented times three.   HEENT: Eye symmetrical and free of discharge bilaterally. Mucous membranes moist and without lesions.  NECK: Supple and without lymphadenopathy. JVP estimated at 10 cm.   CV: Mechanical LVAD hum heard.   RESPIRATORY: Respirations regular, even, and unlabored. Lungs CTA throughout.   GI: Soft and mildly distended with normoactive bowel sounds present in all quadrants.   EXTREMITIES: Trace bilateral foot edema. 2+ bilateral pedal pulses.   NEUROLOGIC: Alert and orientated x 3. CN II-XII grossly intact. No focal deficits.   MUSCULOSKELETAL: No joint swelling or tenderness.   SKIN: No jaundice. No rashes or lesions.     Data:  CMP    Recent Labs  Lab 05/10/17  0417 05/09/17  0632 05/08/17  0553 05/07/17  0725 05/05/17  1023    138 138 139 134   POTASSIUM 3.2* 3.4 3.5 3.5 3.5   CHLORIDE 112* 109 110* 110* 108   CO2 20 18* 18* 19* 17*   ANIONGAP 9 10 10 10 9   * 192* 144* 151* 239*   BUN 15 15 18 20 16   CR 1.65* 1.55* 1.63* 1.75* 1.65*   GFRESTIMATED 42* 45* 43* 39* 42* "   GFRESTBLACK 51* 55* 51* 47* 51*   JOSÉ 8.2* 8.2* 8.4* 8.3* 8.0*   MAG  --   --   --   --  1.8   PROTTOTAL 6.2* 6.0* 6.5* 6.4*  --    ALBUMIN 2.1* 2.0* 2.3* 2.3*  --    BILITOTAL 5.6* 5.0* 2.0* 1.6*  --    ALKPHOS 352* 360* 228* 164*  --    * 224* 77* 43  --    * 110* 38 31  --      CBC    Recent Labs  Lab 05/10/17  0417 05/09/17  0632 05/08/17  1102 05/08/17  0553   WBC 8.4 14.2* 15.7* 14.2*   RBC 3.51* 3.52* 4.07* 3.53*   HGB 10.6* 11.0* 12.7* 10.9*   HCT 32.6* 32.3* 37.4* 33.0*   MCV 93 92 92 94   MCH 30.2 31.3 31.2 30.9   MCHC 32.5 34.1 34.0 33.0   RDW 16.7* 16.5* 16.4* 16.4*    230 224 228     INR    Recent Labs  Lab 05/10/17  0417 05/09/17  0632 05/08/17  0553 05/07/17  0725   INR 1.49* 1.69* 1.94* 2.04*       Patient discussed with Dr. Butler.     Nathalie Whitaker, APRN CNP  [phone]

## 2017-05-10 NOTE — ANESTHESIA POSTPROCEDURE EVALUATION
Patient: Sohan Rendon    Procedure(s):  Endoscopic Retrograde Cholangiopancreatogram, Stent (Zimmon Biliary 7fr 12cm) Placement - Wound Class: II-Clean Contaminated    Diagnosis:Biliary Obstruction   Diagnosis Additional Information: No value filed.    Anesthesia Type:  No value filed.    Note:  Anesthesia Post Evaluation    Patient location during evaluation: PACU  Patient participation: Able to fully participate in evaluation  Level of consciousness: awake  Pain management: satisfactory to patient  Airway patency: patent  Cardiovascular status: acceptable  Respiratory status: acceptable  Hydration status: acceptable  PONV: none     Anesthetic complications: None          Last vitals:  Vitals:    05/09/17 1113 05/09/17 1530 05/09/17 1547   BP: (!) 84/73 102/80 99/87   Pulse:      Resp: 18 16 18   Temp: 36.3  C (97.4  F) 36.5  C (97.7  F) 36.4  C (97.5  F)   SpO2: 100% 99% 97%         Electronically Signed By: Thomas Hilario MD  May 9, 2017  9:15 PM

## 2017-05-10 NOTE — PHARMACY-ANTICOAGULATION SERVICE
Clinical Pharmacy - Warfarin Dosing Consult     Pharmacy has been consulted to manage this patient s warfarin therapy.  Indication: LVAD/RVAD  Therapy Goal: Other - see comments (1.8-2.5)  Warfarin Prior to Admission: Yes  Warfarin PTA Regimen: 4.5mg TTS, 3mg ROW  Recent documented change in oral intake/nutrition: Yes    INR   Date Value Ref Range Status   05/10/2017 1.49 (H) 0.86 - 1.14 Final   05/09/2017 1.69 (H) 0.86 - 1.14 Final     Chromogenic Factor 10   Date Value Ref Range Status   08/10/2014 24 (L) 70 - 130 % Final     Comment:     Therapeutic Range:  A Chromogenic Factor 10 level of approximately 20-40%   inversely correlates with an INR of 2-3 for patients receiving Warfarin.   Chromogenic Factor 10 levels below 20% indicate an INR greater than 3 and   levels above 40% indicate an INR less than 2.       Factor 2 Assay   Date Value Ref Range Status   07/21/2014 25 (L) 60 - 140 % Final     Comment:     Analyte Specific Reagents (ASRs) are used in many laboratory tests necessary   for   standard medical care and generally do not require FDA approval.  This test   was   developed and its performance characteristics determined by [facility].  It has not been cleared or approved by   the US Food and Drug Administration.         Recommend warfarin 5 mg today.  Pharmacy will monitor Sohan Rendon daily and order warfarin doses to achieve specified goal.      Please contact pharmacy as soon as possible if the warfarin needs to be held for a procedure or if the warfarin goals change.      My Knowles, PharmD, BCPS  May 10, 2017

## 2017-05-11 ENCOUNTER — CARE COORDINATION (OUTPATIENT)
Dept: GERIATRIC MEDICINE | Facility: CLINIC | Age: 67
End: 2017-05-11

## 2017-05-11 ENCOUNTER — ALLIED HEALTH/NURSE VISIT (OUTPATIENT)
Dept: CARDIOLOGY | Facility: CLINIC | Age: 67
DRG: 408 | End: 2017-05-11
Attending: INTERNAL MEDICINE
Payer: COMMERCIAL

## 2017-05-11 ENCOUNTER — DOCUMENTATION ONLY (OUTPATIENT)
Dept: CARDIOLOGY | Facility: CLINIC | Age: 67
End: 2017-05-11

## 2017-05-11 DIAGNOSIS — I25.5 ISCHEMIC CARDIOMYOPATHY: Primary | ICD-10-CM

## 2017-05-11 LAB
ALBUMIN SERPL-MCNC: 2.1 G/DL (ref 3.4–5)
ALP SERPL-CCNC: 350 U/L (ref 40–150)
ALT SERPL W P-5'-P-CCNC: 93 U/L (ref 0–70)
ANION GAP SERPL CALCULATED.3IONS-SCNC: 10 MMOL/L (ref 3–14)
AST SERPL W P-5'-P-CCNC: 128 U/L (ref 0–45)
BILIRUB SERPL-MCNC: 7 MG/DL (ref 0.2–1.3)
BUN SERPL-MCNC: 16 MG/DL (ref 7–30)
CALCIUM SERPL-MCNC: 8.2 MG/DL (ref 8.5–10.1)
CHLORIDE SERPL-SCNC: 115 MMOL/L (ref 94–109)
CO2 SERPL-SCNC: 19 MMOL/L (ref 20–32)
CREAT SERPL-MCNC: 1.57 MG/DL (ref 0.66–1.25)
ERYTHROCYTE [DISTWIDTH] IN BLOOD BY AUTOMATED COUNT: 17 % (ref 10–15)
GFR SERPL CREATININE-BSD FRML MDRD: 44 ML/MIN/1.7M2
GLUCOSE SERPL-MCNC: 117 MG/DL (ref 70–99)
HCT VFR BLD AUTO: 31.2 % (ref 40–53)
HGB BLD-MCNC: 10.1 G/DL (ref 13.3–17.7)
INR PPP: 1.44 (ref 0.86–1.14)
LMWH PPP CHRO-ACNC: 0.66 IU/ML
LMWH PPP CHRO-ACNC: 0.79 IU/ML
LMWH PPP CHRO-ACNC: 0.86 IU/ML
MCH RBC QN AUTO: 30.8 PG (ref 26.5–33)
MCHC RBC AUTO-ENTMCNC: 32.4 G/DL (ref 31.5–36.5)
MCV RBC AUTO: 95 FL (ref 78–100)
PLATELET # BLD AUTO: 230 10E9/L (ref 150–450)
POTASSIUM SERPL-SCNC: 3.2 MMOL/L (ref 3.4–5.3)
POTASSIUM SERPL-SCNC: 3.4 MMOL/L (ref 3.4–5.3)
PROT SERPL-MCNC: 6 G/DL (ref 6.8–8.8)
RBC # BLD AUTO: 3.28 10E12/L (ref 4.4–5.9)
SODIUM SERPL-SCNC: 143 MMOL/L (ref 133–144)
WBC # BLD AUTO: 11 10E9/L (ref 4–11)

## 2017-05-11 PROCEDURE — 99233 SBSQ HOSP IP/OBS HIGH 50: CPT | Performed by: INTERNAL MEDICINE

## 2017-05-11 PROCEDURE — 36415 COLL VENOUS BLD VENIPUNCTURE: CPT | Performed by: PHYSICIAN ASSISTANT

## 2017-05-11 PROCEDURE — 93289 INTERROG DEVICE EVAL HEART: CPT | Mod: 26 | Performed by: INTERNAL MEDICINE

## 2017-05-11 PROCEDURE — 99207 ZZC APP CREDIT; MD BILLING SHARED VISIT: CPT | Performed by: PHYSICIAN ASSISTANT

## 2017-05-11 PROCEDURE — 25000132 ZZH RX MED GY IP 250 OP 250 PS 637: Performed by: INTERNAL MEDICINE

## 2017-05-11 PROCEDURE — 85520 HEPARIN ASSAY: CPT | Performed by: PHYSICIAN ASSISTANT

## 2017-05-11 PROCEDURE — 25000132 ZZH RX MED GY IP 250 OP 250 PS 637: Performed by: PHYSICIAN ASSISTANT

## 2017-05-11 PROCEDURE — 85027 COMPLETE CBC AUTOMATED: CPT | Performed by: INTERNAL MEDICINE

## 2017-05-11 PROCEDURE — 25000125 ZZHC RX 250: Performed by: NURSE PRACTITIONER

## 2017-05-11 PROCEDURE — 85610 PROTHROMBIN TIME: CPT | Performed by: PHYSICIAN ASSISTANT

## 2017-05-11 PROCEDURE — 36415 COLL VENOUS BLD VENIPUNCTURE: CPT | Performed by: INTERNAL MEDICINE

## 2017-05-11 PROCEDURE — 25000128 H RX IP 250 OP 636: Performed by: PHYSICIAN ASSISTANT

## 2017-05-11 PROCEDURE — 93282 PRGRMG EVAL IMPLANTABLE DFB: CPT | Mod: ZF

## 2017-05-11 PROCEDURE — 25000128 H RX IP 250 OP 636: Performed by: NURSE PRACTITIONER

## 2017-05-11 PROCEDURE — 25000128 H RX IP 250 OP 636: Performed by: INTERNAL MEDICINE

## 2017-05-11 PROCEDURE — 84132 ASSAY OF SERUM POTASSIUM: CPT | Performed by: PHYSICIAN ASSISTANT

## 2017-05-11 PROCEDURE — 80053 COMPREHEN METABOLIC PANEL: CPT | Performed by: INTERNAL MEDICINE

## 2017-05-11 PROCEDURE — 12000006 ZZH R&B IMCU INTERMEDIATE UMMC

## 2017-05-11 PROCEDURE — 99232 SBSQ HOSP IP/OBS MODERATE 35: CPT | Mod: 25 | Performed by: NURSE PRACTITIONER

## 2017-05-11 RX ORDER — CIPROFLOXACIN 500 MG/1
500 TABLET, FILM COATED ORAL EVERY 12 HOURS SCHEDULED
Status: DISCONTINUED | OUTPATIENT
Start: 2017-05-11 | End: 2017-05-15 | Stop reason: HOSPADM

## 2017-05-11 RX ORDER — MAGNESIUM OXIDE 400 MG/1
400 TABLET ORAL 2 TIMES DAILY
Status: DISCONTINUED | OUTPATIENT
Start: 2017-05-11 | End: 2017-05-15 | Stop reason: HOSPADM

## 2017-05-11 RX ORDER — WARFARIN SODIUM 5 MG/1
5 TABLET ORAL
Status: COMPLETED | OUTPATIENT
Start: 2017-05-11 | End: 2017-05-11

## 2017-05-11 RX ORDER — POTASSIUM CHLORIDE 750 MG/1
40 TABLET, EXTENDED RELEASE ORAL ONCE
Status: COMPLETED | OUTPATIENT
Start: 2017-05-11 | End: 2017-05-11

## 2017-05-11 RX ORDER — FINASTERIDE 5 MG/1
5 TABLET, FILM COATED ORAL DAILY
Qty: 90 TABLET | Refills: 3 | Status: ON HOLD | OUTPATIENT
Start: 2017-05-11 | End: 2018-01-01

## 2017-05-11 RX ORDER — FUROSEMIDE 10 MG/ML
40 INJECTION INTRAMUSCULAR; INTRAVENOUS ONCE
Status: COMPLETED | OUTPATIENT
Start: 2017-05-11 | End: 2017-05-11

## 2017-05-11 RX ORDER — LIDOCAINE 50 MG/G
1 PATCH TOPICAL
Status: DISCONTINUED | OUTPATIENT
Start: 2017-05-11 | End: 2017-05-15 | Stop reason: HOSPADM

## 2017-05-11 RX ADMIN — OLOPATADINE HYDROCHLORIDE 1 DROP: 1 SOLUTION/ DROPS OPHTHALMIC at 20:22

## 2017-05-11 RX ADMIN — HYDRALAZINE HYDROCHLORIDE 10 MG: 20 INJECTION INTRAMUSCULAR; INTRAVENOUS at 22:10

## 2017-05-11 RX ADMIN — POTASSIUM CHLORIDE 40 MEQ: 750 TABLET, EXTENDED RELEASE ORAL at 10:08

## 2017-05-11 RX ADMIN — SIMETHICONE CHEW TAB 80 MG 80 MG: 80 TABLET ORAL at 14:29

## 2017-05-11 RX ADMIN — ATORVASTATIN CALCIUM 10 MG: 10 TABLET, FILM COATED ORAL at 09:03

## 2017-05-11 RX ADMIN — Medication 1 CAPSULE: at 09:04

## 2017-05-11 RX ADMIN — FUROSEMIDE 40 MG: 10 INJECTION, SOLUTION INTRAVENOUS at 17:31

## 2017-05-11 RX ADMIN — PIPERACILLIN SODIUM,TAZOBACTAM SODIUM 3.38 G: 3; .375 INJECTION, POWDER, FOR SOLUTION INTRAVENOUS at 08:55

## 2017-05-11 RX ADMIN — HEPARIN SODIUM 950 UNITS/HR: 10000 INJECTION, SOLUTION INTRAVENOUS at 12:03

## 2017-05-11 RX ADMIN — LORATADINE 10 MG: 10 TABLET ORAL at 09:04

## 2017-05-11 RX ADMIN — MELATONIN TAB 3 MG 3 MG: 3 TAB at 22:05

## 2017-05-11 RX ADMIN — Medication 100 MG: at 09:04

## 2017-05-11 RX ADMIN — ACETAMINOPHEN 650 MG: 325 TABLET, FILM COATED ORAL at 15:01

## 2017-05-11 RX ADMIN — BISACODYL 10 MG: 10 SUPPOSITORY RECTAL at 09:05

## 2017-05-11 RX ADMIN — PIPERACILLIN SODIUM,TAZOBACTAM SODIUM 3.38 G: 3; .375 INJECTION, POWDER, FOR SOLUTION INTRAVENOUS at 04:18

## 2017-05-11 RX ADMIN — ALLOPURINOL 100 MG: 100 TABLET ORAL at 09:04

## 2017-05-11 RX ADMIN — PIPERACILLIN SODIUM,TAZOBACTAM SODIUM 3.38 G: 3; .375 INJECTION, POWDER, FOR SOLUTION INTRAVENOUS at 14:13

## 2017-05-11 RX ADMIN — PANTOPRAZOLE SODIUM 40 MG: 40 TABLET, DELAYED RELEASE ORAL at 09:03

## 2017-05-11 RX ADMIN — LOSARTAN POTASSIUM 25 MG: 25 TABLET, FILM COATED ORAL at 20:14

## 2017-05-11 RX ADMIN — LEVETIRACETAM 750 MG: 750 TABLET, FILM COATED ORAL at 09:03

## 2017-05-11 RX ADMIN — SENNOSIDES AND DOCUSATE SODIUM 2 TABLET: 8.6; 5 TABLET ORAL at 20:19

## 2017-05-11 RX ADMIN — LEVETIRACETAM 750 MG: 750 TABLET, FILM COATED ORAL at 20:17

## 2017-05-11 RX ADMIN — LIDOCAINE 1 PATCH: 50 PATCH TOPICAL at 20:22

## 2017-05-11 RX ADMIN — HYDRALAZINE HYDROCHLORIDE 10 MG: 20 INJECTION INTRAMUSCULAR; INTRAVENOUS at 04:25

## 2017-05-11 RX ADMIN — LOSARTAN POTASSIUM 25 MG: 25 TABLET, FILM COATED ORAL at 09:12

## 2017-05-11 RX ADMIN — WARFARIN SODIUM 5 MG: 5 TABLET ORAL at 17:31

## 2017-05-11 RX ADMIN — METOPROLOL SUCCINATE 50 MG: 50 TABLET, EXTENDED RELEASE ORAL at 09:13

## 2017-05-11 RX ADMIN — Medication 1 CAPSULE: at 20:15

## 2017-05-11 RX ADMIN — MAGNESIUM OXIDE TAB 400 MG (241.3 MG ELEMENTAL MG) 400 MG: 400 (241.3 MG) TAB at 10:08

## 2017-05-11 RX ADMIN — HYDRALAZINE HYDROCHLORIDE 10 MG: 20 INJECTION INTRAMUSCULAR; INTRAVENOUS at 13:27

## 2017-05-11 RX ADMIN — CIPROFLOXACIN HYDROCHLORIDE 500 MG: 500 TABLET, FILM COATED ORAL at 22:05

## 2017-05-11 RX ADMIN — OLOPATADINE HYDROCHLORIDE 1 DROP: 1 SOLUTION/ DROPS OPHTHALMIC at 09:04

## 2017-05-11 RX ADMIN — MAGNESIUM OXIDE TAB 400 MG (241.3 MG ELEMENTAL MG) 400 MG: 400 (241.3 MG) TAB at 20:15

## 2017-05-11 RX ADMIN — SENNOSIDES AND DOCUSATE SODIUM 3 TABLET: 8.6; 5 TABLET ORAL at 09:03

## 2017-05-11 RX ADMIN — METOPROLOL SUCCINATE 25 MG: 25 TABLET, EXTENDED RELEASE ORAL at 20:18

## 2017-05-11 NOTE — PROVIDER NOTIFICATION
BONILLA Everett notified that patient was up in chair when he began having epigastric pain, returned to bed, some relief obtained.  Abdomen is more distended and firm than earlier today.  Okay to put NG back to LIS.

## 2017-05-11 NOTE — PROGRESS NOTES
"  GASTROENTEROLOGY PROGRESS NOTE    Date of Admission: 5/2/2017  Reason for Admission: abd pain      Assessment:  67 year old male with a history of chronic systolic heart failure s/p LVAD on Destination Therapy who is admitted to the hospital 5/2 for worsening RUQ abdominal pain. Initially, RUQ US showing GB wall thickness of 3.5mm along with duodenitis, surgery consulted did not think was acute cholecystitis. Subsequently had increasing leukocytosis and inflammatory markers prompting repeat imaging which showed resolved duodenitis but repeat RUQ US showing 7mm thicken GB wall.     Liver tests rising therefore patient under went ERCP with transpapillary cystic duct stenting to drain GB on 5/9. There was gross hemobilia - blood draining from bile duct and GB. No sphincterotomy done, 7F x 12cm DPT GB placed.     New changes:  XR 5/10 showed non-obstructive pattern however NG still placed with improvement of pain and distension. Pain improved overall today. T bili continues to rise however patient improving, expect it to fall over the coming days.      Recommendations:  OK to continue AC  Continue bowel regimen  OK for CLD, clamp NG  Pain meds, antiemetics per primary team  Discussed with primary team, Holli Geiger PA-C     Pt seen and care plan discussed with Dr. Grove GI staff physician.     Violeta Fleming PA-C  Advanced Endoscopy/Pancreaticobiliary PATRICIA  [facility]  Pager *[phone]  _______________________________________________________________      Subjective: Patient seen and examined at 1100. Patient is sleeping soundly. Discussed symptoms with his daughter at bedside. She states that abd pain is much improved today. Had bowel movement today too.    ROS:   4 pt ROS negative unless noted in subjective.     Objective:  Blood pressure (!) 132/94, pulse 80, temperature 97.8  F (36.6  C), temperature source Oral, resp. rate 16, height 1.727 m (5' 8\"), weight 90.8 kg (200 lb 2.8 oz), " SpO2 99 %.  Gen: Mild distress  HEENT: + icterus  Resp: Clear bilaterally anteriorly  CV: RRR, LVAD audible  Abd: less distension today, BS+, no peritoneal signs  Ext: LE edema      PROCEDURES:  ERCP 5/9 - Dr. Grove  Extremely difficult UGI anatomy requring endviewer and 140 sidewiewer over wire. Gross hemobilia, draining from papilla. Blood filled bile duct and gallbladder. Baloon dilated cystic duct and placed 7F 12xm double pigtail into gallbladder.     LABS:  BMP    Recent Labs  Lab 05/11/17  0546 05/10/17  1725 05/10/17  0417 05/09/17  0632 05/08/17  0553     --  141 138 138   POTASSIUM 3.2* 3.7 3.2* 3.4 3.5   CHLORIDE 115*  --  112* 109 110*   JOSÉ 8.2*  --  8.2* 8.2* 8.4*   CO2 19*  --  20 18* 18*   BUN 16  --  15 15 18   CR 1.57*  --  1.65* 1.55* 1.63*   *  --  133* 192* 144*     CBC    Recent Labs  Lab 05/11/17  0546 05/10/17  0417 05/09/17  0632 05/08/17  1102   WBC 11.0 8.4 14.2* 15.7*   RBC 3.28* 3.51* 3.52* 4.07*   HGB 10.1* 10.6* 11.0* 12.7*   HCT 31.2* 32.6* 32.3* 37.4*   MCV 95 93 92 92   MCH 30.8 30.2 31.3 31.2   MCHC 32.4 32.5 34.1 34.0   RDW 17.0* 16.7* 16.5* 16.4*    229 230 224     INR    Recent Labs  Lab 05/11/17  0546 05/10/17  0417 05/09/17  0632 05/08/17  0553   INR 1.44* 1.49* 1.69* 1.94*     LFTs    Recent Labs  Lab 05/11/17  0546 05/10/17  0417 05/09/17  0632 05/08/17  0553   ALKPHOS 350* 352* 360* 228*   * 238* 224* 77*   ALT 93* 124* 110* 38   BILITOTAL 7.0* 5.6* 5.0* 2.0*   PROTTOTAL 6.0* 6.2* 6.0* 6.5*   ALBUMIN 2.1* 2.1* 2.0* 2.3*      PANC    Recent Labs  Lab 05/10/17  0417   LIPASE 98         IMAGING:  EXAMINATION: US ABDOMEN LIMITED, 5/7/2017 11:58 AM           IMPRESSION:   Increased gallbladder wall thickening of 7.4 mm, previously 3.5, and  new pericholecystic fluid, with redemonstrated gallbladder sludge.  Differential remains acalculus cholecystitis versus changes secondary  to hepatic dysfunction.         EXAMINATION: CT CHEST ABDOMEN PELVIS W/O  CONTRAST 5/6/2017 4:11 PM       IMPRESSION:  1. Although limited by lack of intravenous contrast, no definitive CT  evidence of infection associated with the left ventricular assist  device.  2. No significant change in opacities of the lower lobes, right  greater the left, which may represent atelectasis with possible  superimposed pneumonia.  3. Persistent thickening of the rectum, which may be due to stool  burden and or proctitis.   5. Moderate cardiomegaly. Mild dilatation of the main pulmonary  artery, measuring up to 3.4 cm in diameter. The latter finding may  represent some degree of underlying pulmonary arterial hypertension.  6. Near-complete resolution of previously demonstrated fat stranding  adjacent to the second portion of the duodenum.  7. Significant distention of the gallbladder, which may be due to  fasting state. No radiopaque gallstones noted. No adjacent fat  stranding or CT findings to suggest acute cholecystitis.  8. Hypoattenuating right thyroid nodule, which occupies the majority  of the gland. 9. Groundglass pulmonary nodule within the right middle  lobe, which may be infectious. No significant change from prior study  5/3/2017.

## 2017-05-11 NOTE — PROVIDER NOTIFICATION
BONILLA Everett notified that NG was placed to LIS, nothing came out immediately and patient was not getting any relief from epigastric pain.  NG clamped and simethicone given.  Instructed to keep NG to LIS between meds and can try clamping again if pain resolves.

## 2017-05-11 NOTE — MR AVS SNAPSHOT
"              After Visit Summary   5/11/2017    Sohan Rendon    MRN: [id number]           Patient Information     Date Of Birth          1950        Visit Information        Provider Department      5/11/2017 5:00 AM 1, Uc Cv Device [facility]        Today's Diagnoses     Ischemic cardiomyopathy    -  1       Follow-ups after your visit        Your next 10 appointments already scheduled     Jun 13, 2017  2:00 PM CDT   Return Visit with Thomas Dill MD   [facility] ([facility])    [address]  [address]  [facility] [id number] [phone]            Jun 22, 2017  4:20 PM CDT   (Arrive by 4:05 PM)   Return Visit with Sebastian Meier MD   [facility] [place] ([facility])    [address]  4th Floor  [facility] [address] [phone]              Who to contact     If you have questions or need follow up information about today's clinic visit or your schedule please contact [facility] directly at [phone].  Normal or non-critical lab and imaging results will be communicated to you by [employer], letter or phone within 4 business days after the clinic has received the results. If you do not hear from us within 7 days, please contact the clinic through [employer] or phone. If you have a critical or abnormal lab result, we will notify you by phone as soon as possible.  Submit refill requests through [employer] or call your pharmacy and they will forward the refill request to us. Please allow 3 business days for your refill to be completed.          Additional Information About Your Visit        MyChart Information     [employer] lets you send messages to your doctor, view your test results, renew your prescriptions, schedule appointments and more. To sign up, go to [email or web address] . Click on \"Log in\" on the left side of the screen, which will take you to the " "Welcome page. Then click on \"Sign up Now\" on the right side of the page.     You will be asked to enter the access code listed below, as well as some personal information. Please follow the directions to create your username and password.     Your access code is: [email or web address]  Expires: 2017 12:00 PM     Your access code will  in 90 days. If you need help or a new code, please call your [facility] clinic or [phone].        Care EveryWhere ID     This is your Care EveryWhere ID. This could be used by other organizations to access your [facility] medical records  [id number]         Blood Pressure from Last 3 Encounters:   17 97/64   17 (!) 154/111   17 (!) 125/98    Weight from Last 3 Encounters:   17 90.5 kg (199 lb 8.3 oz)   17 81.6 kg (180 lb)   17 81.6 kg (180 lb)              We Performed the Following     ICD DEVICE PROGRAMMING EVAL, SINGLE LEAD ICD          Today's Medication Changes      Notice     This visit is during an admission. Changes to the med list made in this visit will be reflected in the After Visit Summary of the admission.             Primary Care Provider Office Phone # Fax #    Thomas Dill [phone]        [facility] [address] [place] [address] [address] [place] [id number]        Thank you!     Thank you for choosing [facility]  for your care. Our goal is always to provide you with excellent care. Hearing back from our patients is one way we can continue to improve our services. Please take a few minutes to complete the written survey that you may receive in the mail after your visit with us. Thank you!             Your Updated Medication List - Protect others around you: Learn how to safely use, store and throw away your medicines at www.disposemymeds.org.      Notice     This visit is during an admission. Changes to the med list made in this visit will be reflected in the After Visit Summary of the " admission.

## 2017-05-11 NOTE — PROGRESS NOTES
Pt and Pt's family did not have any VAD related concerns or questions. Emotional support offered.

## 2017-05-11 NOTE — PLAN OF CARE
"Problem: Goal Outcome Summary  Goal: Goal Outcome Summary  Outcome: Improving  /85  Pulse 80  Temp 97.8  F (36.6  C) (Oral)  Resp 16  Ht 1.727 m (5' 8\")  Wt 90.8 kg (200 lb 2.8 oz)  SpO2 99%  BMI 30.44 kg/m2     AOx4, [place] Ze[place] speaking,  present for rounds, blue  phone used throughout the day.  VSS, received Hydralazine PRN x 1 for /94 , SR, afebrile, on room air.  No LVAD alarms this shift, dressing changed, driveline site WNL.  NG clamped this morning, tolerated well all shift but began C/O epigastric pain after getting up to chair, returned to bed, simethicone given.  Mcarthur in place with karely/redish urine, med BM this morning after receiving suppository.  Heparin gtt decreased to 950 unit/hr awaiting 10a results, potassium replaced.      "

## 2017-05-11 NOTE — PLAN OF CARE
"Problem: Goal Outcome Summary  Goal: Goal Outcome Summary  Outcome: No Change  BP 99/83  Pulse 80  Temp 97.5  F (36.4  C) (Axillary)  Resp 14  Ht 1.727 m (5' 8\")  Wt 90.8 kg (200 lb 2.8 oz)  SpO2 97%  BMI 30.44 kg/m2     Neuro: A/Ox4.   Cardiac: SR with HR 70's. LVAD without complications. VSS. PRN hydralazine administered x1 for MAP >90.   Respiratory: O2 sats stable on RA. CAPNO in place with Co2 25-26.   GI/: Mcarthur intact with adequate UOP.   Diet/appetite: Regular.   Activity: Ax 1-2 - per family at bedside.   Pain: Pt appears to be resting comfortably - no c/o of abd pain/discomfort.   Skin: Intact, no new deficits noted.       NG to LIS with brown/clear output. Heparin gtt at 10.5mL/hr.       R: Continue with POC. Notify primary team with changes.      "

## 2017-05-11 NOTE — PROGRESS NOTES
CM received a call from Meg LOPEZ at [facility] ([phone]) with a question about client's hospital bed. The family was asking about how they would get it fixed if it wasn't working correctly. Client received the hospital bed before he was with [facility] so AMANDA does not know where the bed came from. AMANDA suggested the family look on the bed for a company label. Once this information is found, it can be determined when client received the bed. AMANDA also spoke with Stephanie AMANDA from [facility] ([phone]) and she does not know where client got the hospital bed. AMANDA informed her of the dated client was admitted to the hospital. She stated she is still working with [facility] on transferring client to them for CADI CM. She will call AMANDA when she finds out who the new CADI CM will be. She would also like to be contacted when client is discharged from the hospital.   Nena Salazar RN  [facility]   [phone]

## 2017-05-11 NOTE — PROGRESS NOTES
ADDENDUM TO note from 5/10/17:  - Will restart finasteride, a medication that Mr. Rendon had taken previously but had run out of refills.  This should help with voiding and prostatic bleeding (should this be the source).    - RX was placed in the d/c navigator    ÓSCAR SolerC

## 2017-05-11 NOTE — CONSULTS
[facility]   Cardiology II Consult/ Advanced Heart Failure  Daily Progress Note  Date of Service: 5/11/2017      Patient: Sohan Rendon  MRN: [id number]  Admission Date: 5/2/2017  Hospital Day # 7    Assessment and Plan: Mr. 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 He presents to ED for abdominal pain secondary to duodenitis. Cardiology consult requested per IM for LVAD management.     ICM s/p LVAD HM II as DT. 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 Stage D, NYHA Class III. Blood pressures better controlled with MAPs in the 80s-90s range after increasing Losartan yesterday. Recommend continuing IV Hydralazine PRN for MAP > 90 mmHg. Appears mildly hypervolemic on exam, would recommend giving Lasix IV 40mg after potassium level is replaced (was 3.2 this morning).  ACEi/ARB: Continue Losartan 25 mg po BID.   BB: Continue Toprol XL 50 mg in AM and 25 mg at hs.   Aldosterone antagonist: contraindicated due to renal dysfunction.   SCD prophylaxis ICD  Fluid status: mildly hypervolemic after getting IV fluids, recommend IV Lasix 40mg after potassium is replaced.  MAP: 80s-100s. PRN Hydralazine for MAP > 90 mmHg.  LDH: 548 on 5/6, baseline in the 700s-800s.   Anticoagulation: Being bridged with heparin after being reversed for his GI procedure on 5/9. INR 1.44 today, goal 1.8-2.5. Warfarin was resumed on 5/10.  Antiplatelet: ASA remains on hold given recurrent hemolysis/hematuria.  "  ================================================================    Interval History/ROS: Mr. Rendon is feeling better today, and his abdomen is less distended. Denies any SOB, edema, chest pain, dizziness, or palpitations.    LVAD interrogated and there have been no alarms or significant events indicative of pump malfunction.    Last 24 hr care team notes reviewed.   ROS:  4 point ROS including Respiratory, CV, GI and , other than that noted in the HPI, is negative.     Medications: Reviewed in EPIC.     Physical Exam:   /83  Pulse 80  Temp 98.9  F (37.2  C) (Oral)  Resp 16  Ht 1.727 m (5' 8\")  Wt 90.8 kg (200 lb 2.8 oz)  SpO2 97%  BMI 30.44 kg/m2  GENERAL: Appears alert and oriented times three.   HEENT: Eye symmetrical and free of discharge bilaterally. Mucous membranes moist and without lesions.  NECK: Supple and without lymphadenopathy. JVP estimated at 10 cm.   CV: Mechanical LVAD hum heard.   RESPIRATORY: Respirations regular, even, and unlabored. Lungs CTA throughout.   GI: Soft and mildly distended with normoactive bowel sounds present in all quadrants.   EXTREMITIES: Trace bilateral foot and arm edema. 2+ bilateral pedal pulses.   NEUROLOGIC: Alert and orientated x 3. CN II-XII grossly intact. No focal deficits.   MUSCULOSKELETAL: No joint swelling or tenderness.   SKIN: No jaundice. No rashes or lesions.     Data:  CMP    Recent Labs  Lab 05/11/17  0546 05/10/17  1725 05/10/17  0417 05/09/17  0632 05/08/17  0553  05/05/17  1023     --  141 138 138  < > 134   POTASSIUM 3.2* 3.7 3.2* 3.4 3.5  < > 3.5   CHLORIDE 115*  --  112* 109 110*  < > 108   CO2 19*  --  20 18* 18*  < > 17*   ANIONGAP 10  --  9 10 10  < > 9   *  --  133* 192* 144*  < > 239*   BUN 16  --  15 15 18  < > 16   CR 1.57*  --  1.65* 1.55* 1.63*  < > 1.65*   GFRESTIMATED 44*  --  42* 45* 43*  < > 42*   GFRESTBLACK 54*  --  51* 55* 51*  < > 51*   JOSÉ 8.2*  --  8.2* 8.2* 8.4*  < > 8.0*   MAG  --   --   --   --   --   " --  1.8   PROTTOTAL 6.0*  --  6.2* 6.0* 6.5*  < >  --    ALBUMIN 2.1*  --  2.1* 2.0* 2.3*  < >  --    BILITOTAL 7.0*  --  5.6* 5.0* 2.0*  < >  --    ALKPHOS 350*  --  352* 360* 228*  < >  --    *  --  238* 224* 77*  < >  --    ALT 93*  --  124* 110* 38  < >  --    < > = values in this interval not displayed.  CBC    Recent Labs  Lab 05/11/17  0546 05/10/17  0417 05/09/17  0632 05/08/17  1102   WBC 11.0 8.4 14.2* 15.7*   RBC 3.28* 3.51* 3.52* 4.07*   HGB 10.1* 10.6* 11.0* 12.7*   HCT 31.2* 32.6* 32.3* 37.4*   MCV 95 93 92 92   MCH 30.8 30.2 31.3 31.2   MCHC 32.4 32.5 34.1 34.0   RDW 17.0* 16.7* 16.5* 16.4*    229 230 224     INR    Recent Labs  Lab 05/11/17  0546 05/10/17  0417 05/09/17  0632 05/08/17  0553   INR 1.44* 1.49* 1.69* 1.94*       Patient discussed with Dr. Butler.     Nathalie Whitaker, APRN CNP  [phone]

## 2017-05-11 NOTE — PROGRESS NOTES
[facility] [place]    Internal Medicine Progress Note - Gold Service      Assessment & Plan   Sohan Rendon is a 67 year old male with a history of systolic CHF secondary to ischemic cardiomyopathy s/p HM II LVAD (DT) and ICD (2011) complicated by recurrent hemolysis and pump thrombus, multiple CVAs with residual left-sided weakness, PFO s/p closure in 10/2012, mitral regurgitation s/p MVR in 2/2012, CKD, hematuria secondary to anti-coagulation, latent TB s/p INH treatment, seizure, HTN, gout, chronic anemia, BPH, esophagitis, GERD, PUD, C.diff colitis, and recent admission (2/11-2/15) for appendicitis which was managed conservatively who was admitted on 5/2/2017 with right-sided abdominal pain secondary to acalculous cholecystitis.    Acute Acalculous Cholecystitis - Presented with right-sided abdominal pain. Abdominal US on admission (5/3) showed gallbladder sludge with non-specific mild gallbladder wall thickening w/o cholelithiasis. Evaluated by General Surgery (5/3) who did not feel exam, imaging, or history was consistent with cholecystitis. Rising CRP, Procalcitonin, and WBC prompted repeat RUQ US (5/7) which showed gallbladder wall had doubled in size (from 3.5 mm to 7.4 mm) with new pericholecystic fluid and demonstrated gallbladder sludge consistent with acalculous cholecystitis. Underwent ERCP on 5/9 with findings of gross hemobilia s/p balloon dilation of cystic duct and gallbladder stenting. LFTs wnl on admission (5/2) with acute increase on 5/7 and progressive worsening since; GI feels worsening T.Bili elevation post-ERCP is due to transient obstruction from clotting blood in setting of hemobilia drainage. TBili 7.0 (5.6), Alk Phos 350 (352), ALT 93 (124),  (238) today. Afebrile. Leukocytosis resolved as of 5/10 with WBC 11.0 today (peak of 19.0 this admit). No lactic acidosis. Remainder of infectious work-up negative to date including Blood Cultures X 4, Sputum Cx,  Urine Culture, C.diff PCR, and Enteric XOCHILT.  - Discontinue IV Zosyn. Start Ciprofloxacin 500 mg BID (renally-dosed). Resume Zosyn if clinical decompensation or fevers.  - No NSAIDs  - Zofran prn for n/v  - Tylenol 650 mg q 6h prn and Oxycodone 5 mg q 4h prn for pain  - Trend CMP and CBC on 5/12  - Appreciate GI following    Recurrent Generalized Abdominal Pain and Distention 2/2 Ileus vs Severe Constipation - Developed ~5/8. AXR (5/8) showed non-obstructive bowel pattern. Significantly improved 5/9 after passing large amount of flatus and having BM. Recurrent overnight from 5/9-5/10. AXR (5/10) initially read as ileus vs developing SBO, thus NG was placed, but was later read as non-obstructive bowel gas pattern. Symptoms significantly improved s/p NG placement. Developed worsening abdominal pain and distention with clamping trial this afternoon. Last BM 5/11 and was loose. No n/v.  - NG to LIS. If doing well this evening, can trial clamping if RN staff/patient request.  - NPO while NG to LIS. If NG clamped this evening and patient tolerates, can trial clear liquids.  - Start Mag Ox 400 mg BID  - Continue Senna-Docusate 2-3 tabs BID (hold for loose stools)  - Daily Dulcolax suppositories  - Continue Culturell 1 capsule BID  - Tap water enemas prn if no BM in 24 hours  - Simethicone prn for abdominal cramping    Chronic Anticoagulation secondary to LVAD - INR goal 1.8-2.5 in setting of recurrent hematuria. INR supra-therapeutic on admission with peak of 4.19 on 5/4 likely due to decreased PO intake. Coumadin held 5/4-5/5, resumed 5/6-5/7, held 5/8-5/9 due to ERCP, and resumed 5/10. Receiving Heparin gtt bridging while Coumadin on hold/INR subtherapeutic. Received 2 mg IV Vitamin K on 5/7 and 1 u FFP on 5/9 for INR reversal prior to ERCP. INR 1.44 today.  - Continue Coumadin per pharmacy dosing.  - Daily INR monitoring  - Continue Heparin gtt until INR therapeutic     Chronic Systolic Heart Failure secondary to  Ischemic Cardiomyopathy s/p ICD and HM II LVAD (10/9/2012) - As destination therapy (DT). Stage D, NYHA Class III. 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 Most recent weight 90.8 kg on 5/11 (increased from 81.6 kg on 5/2). Appears mildly hypervolemic on exam (BLE edema, elevated MAPs). Not on any outpatient diuretics.  - Give one time dose of Lasix 40 mg IV  - Anticoagulation as above  - LVAD settings: Flow ~4.5, PI ~5.6-6.1, Speed 8800, Power ~5.5. No alarms over past 24 hours.  - Continue Losartan and Toprol-XL. Losartan dosing increased to 25 mg BID (from home dose of 25 mg QD) on 5/10 due to elevated MAPs.  - Not on ASA due to recurrent hematuria/hemolysis  - Not on aldosterone antagonist due to renal dysfunction  - Daily weights with I/Os.   - Continue home Atorvastatin  - Daily sterile dressing changes to driveline site. No evidence of driveline infection on exam or imaging.    Recurrent Hematuria - Since June 2016. Previously attributed to UTI, BPH, and neurogenic bladder in setting of anticoagulation. INR goal decreased to 1.8-2.5 and ASA held as a result. Developed recurrent hematuria overnight from 5/9-5/10. Hgb stable in 10s since developed. Evaluated by Urology 5/10 who noted it is expected that intermittent gross hematuria will persist while on anti-coagulation.  - Continue Flomax per PTA dosing  - Resume Finasteride at discharge per Urology recommendations  - Contact Urology if thickening red urine or Hgb drop which is felt to be secondary to  blood losses  - Follow up on urine cytology/FISH (ordered by Urology for recurrent hematuria)   - Outpatient follow up with Dr. eMier for evaluation of recurrent hematuria and discuss need for repeat  cystoscopy     HTN - MAP goal 65-85. MAPs remain elevated in 80s-90s, but are improved from 5/10 when they were in the 100s. Pain and mild hypervolemia may be contributing to elevated MAPs.  - Give one-time dose of IV Lasix 40 mg .  - Continue Cozaar 25 mg BID (increased from home dose of 25 mg QD on 5/10 per Cardiology recommendations)  - Continue Toprol-XL 50 mg q AM/25 mg q PM. Hold for MAP <60.   - IV Hydralazine PRN for MAP >90 mmHG  - Avoid BP checks on left side given hemiparesis    Acute on Chronic Normocytic Anemia - Baseline Hgb ~13-14 secondary to iron deficiency and anemia of chronic disease. Hgb 13.2 on admission. Hgb decreased to ~10-11 this admission, last 10.1 on 5/11. Evidence of gross hemobilia on ERCP and intermittent hematuria which may be contributing. No s/s of GI bleeding, though GI notes melena would not be unexpected given findings of hemobilia on ERCP with gallbladder stenting.   - Trend daily CBC. Transfuse to maintain Hgb >8.  - Resume home iron supplements once infection resolves.     Urinary Retention in setting of BPH - S/p TURP in 2014. On Flomax 0.4 mg PTA. Developed urinary retention this hospitalization s/p Mcarthur placement on 5/5.   - Hold home Flomax while Mcarthur in place  - Continue indwelling Mcarthur. Follow up with Urology 1 week after discharge to assess for Mcarthur removal.    NAGMA - Since 5/3. Likely secondary to CKD. Clinical picture not c/w RTA. No GI losses. Transiently resolved 5/10 and recurred 5/11. Bicarb 19 today.   - Trend daily BMP.     Hypokalemia - Since 5/10. Likely due to NPO status. K 3.2 today. Received 40 mEq KCl PO X 1 today. Repeat Potassium 4 hours post-replacement improved to 3.4.  - Trend daily BMP.  - Avoid replacement protocol in setting of CKD.    Chronic Stable Medical Conditions and Resolved Hospital Issues  Duodenitis - Admission CT Chest/Abd/Pelvis (5/5) with inflammatory fat stranding around second portion of duodenum with extension along the right  anterior pararenal fascia c/w duodenitis; no localized fluid collections or free air to suggest perforation. Repeat CT (5/6) showed near complete resolution. H. Pylori stool antigen, C.diff PCR, and Enteric XOCHILT negative on 5/7. Continue Protonix 40 mg QD.  Multiple CVAs - Residual left-sided hemiparesis. Lift dependent at baseline. Continue home Atorvastatin. Defer PT/OT given patient at baseline level of mobility.  Seizure Disorder - No recent seizures. Continue PTA Keppra.  CKD - Baseline Cr ~1.6-1.8. Cr stable at 1.57 today. Adequate UOP. Avoid nephrotoxins. Renally adjust medications. Trend daily BMP.  Gout - Stable without evidence of flare. Continue PTA Allopurinol.  Seasonal Allergies - Stable. Continue Claritin daily per PTA regimen.    Diet: NPO except for meds.  Fluids: None  DVT Prophylaxis: Coumadin with Heparin gtt bridging, Protonix.   Code Status: Full Code    Disposition Plan   Expected discharge in 3-5 days pending medical stability (INR therapeutic on Coumadin, having regular BMs, tolerating regular diet, improvement in MAPs). Recommended to home with resumption of home PCA hours (16 hours/day) and home health care.      Entered: Kelin Geiger 05/11/2017, 10:27 AM    The patient's care was discussed with the Dr. Peters (attending MD), Bedside Nurse, Cardiology, and GI.    Holli Geiger PA-C  Internal Medicine Hospitalist Service  [facility]  Pager: [phone]  Please see sticky note for cross cover information    Interval History   Abdominal pain and distention significantly improved since NG placement. Not passing flatus. No BM since 5/9. Mild throat discomfort from NG tube. Complaining of mid-back pain which started this morning after being repositioned. Denies any headaches, dizziness, chest pain, SOB, nausea, or vomiting.     Data reviewed today: I reviewed all medications, new labs and imaging results over the last 24 hours. I personally reviewed CMP, CBC, INR,  Heparin 10A level, and AXR x 2 from 5/10.    Physical Exam   Vital Signs: Temp: 98.9  F (37.2  C) Temp src: Oral BP: 110/83 Pulse: 80 Heart Rate: 76 Resp: 16 SpO2: 97 % O2 Device: None (Room air)    Weight: 200 lbs 2.84 oz  General Appearance: Chronically ill male who is awake, sitting up in bed, non-toxic appearing, and in NAD. Daughters at bedside.   HEENT: NC/AT. Anicteric sclera. NG tube in place. Mucous membranes moist.  Cardiovascular: LVAD hum. S1,S2.   Respiratory: Normal effort on room air. No wheezes, rhonchi, or crackles.  GI: Soft, non-tender, and minimally distended. Hypoactive bowel sounds.   Extremities: 1+ pitting edema of BLE. Warm and well perfused.  Skin: LVAD driveline with dressing in place which is c/d/i. No rashes or jaundice.      Data   Medications     Warfarin Therapy Reminder       HEParin 950 Units/hr (05/11/17 0822)     - MEDICATION INSTRUCTIONS -         warfarin  5 mg Oral ONCE at 18:00     magnesium oxide  400 mg Oral BID     senna-docusate  2-3 tablet Oral BID     bisacodyl  10 mg Rectal Daily     lactobacillus rhamnosus (GG)  1 capsule Oral BID     losartan  25 mg Oral BID     sodium chloride (PF)  3 mL Intracatheter Q8H     piperacillin-tazobactam  3.375 g Intravenous Q6H     pantoprazole  40 mg Oral QAM AC     olopatadine  1 drop Both Eyes BID     allopurinol  100 mg Oral Daily     atorvastatin  10 mg Oral Daily     levETIRAcetam  750 mg Oral BID     loratadine  10 mg Oral Daily     melatonin  3 mg Oral At Bedtime     thiamine  100 mg Oral Daily     sodium chloride (PF)  3 mL Intracatheter Q8H     metoprolol  50 mg Oral Daily     metoprolol  25 mg Oral QPM     Data     Recent Labs  Lab 05/11/17  0546 05/10/17  1725 05/10/17  0417 05/09/17  0632   WBC 11.0  --  8.4 14.2*   HGB 10.1*  --  10.6* 11.0*   MCV 95  --  93 92     --  229 230   INR 1.44*  --  1.49* 1.69*     --  141 138   POTASSIUM 3.2* 3.7 3.2* 3.4   CHLORIDE 115*  --  112* 109   CO2 19*  --  20 18*   BUN 16   --  15 15   CR 1.57*  --  1.65* 1.55*   ANIONGAP 10  --  9 10   JOSÉ 8.2*  --  8.2* 8.2*   *  --  133* 192*   ALBUMIN 2.1*  --  2.1* 2.0*   PROTTOTAL 6.0*  --  6.2* 6.0*   BILITOTAL 7.0*  --  5.6* 5.0*   ALKPHOS 350*  --  352* 360*   ALT 93*  --  124* 110*   *  --  238* 224*   LIPASE  --   --  98  --      Recent Results (from the past 24 hour(s))   XR Abdomen Port 1 View    Narrative    EXAM: XR ABDOMEN PORT F1 VW  5/10/2017 2:08 PM      HISTORY: eval for ileus, SBO    COMPARISON: 5/8/2017    FINDINGS: Partially visualized LVAD and Mcarthur catheter are unchanged  in position. New biliary stent projecting with the distal tip  projecting over the second portion of duodenum. Air-filled  nondistended small bowel loops scattered throughout the abdomen.  Colonic gas is present. No pneumatosis or portal venous gas.      Impression    IMPRESSION: New biliary stent appears in good position. Nonobstructive  bowel gas pattern.    I have personally reviewed the examination and initial interpretation  and I agree with the findings.    ISRAEL PEOPLES MD   XR Abdomen Port 1 View    Narrative    EXAM: XR ABDOMEN PORT F1 VW  5/10/2017 4:02 PM      HISTORY: confirm NG tube placement    COMPARISON: Same day radiograph    FINDINGS: New NG tube tip projecting over the stomach and sidehole  just below the gastroesophageal junction. Partially visualized LVAD,  epigastric surgical clips, median sternotomy wires and Mcarthur catheter.  Unchanged biliary stent position. Nonobstructive bowel gas pattern.       Impression    IMPRESSION: New NG tube tip at the level of the stomach and sidehole  just below the gastroesophageal junction.  Again seen are mildly prominent loops of small bowel without definite  dilation.     I have personally reviewed the examination and initial interpretation  and I agree with the findings.    ABEL PURVIS MD       Attestation:  I, Gabe Peters, saw and evaluated Sohan Rendon as part of a  shared visit.  I have reviewed and discussed with the advanced practice provider their history, physical and plan.      I have reviewed today's care team notes, Medications, Vital Signs and Labs.     My key history or physical exam findings: No acute events. Feeling better this am. Abdomen less distended. Less pain. Does have some upper back pain this am. No stool and not passing gas. Denies N/V.      Physical exam:  General: Alert and oriented, well nourished, in NAD  Chest/Pulmonary: CTAB, moving air well, no rales or wheezes, no tachypnea   Cardiovascular: LVAD sounds, trace LE edema. Good perfusion.  Abdomen: NABS, soft, mildly distended, non-tender today  Skin: no diaphoresis, skin color normal     Key management decisions made by me:   Continue to monitor bili. GI not planning to intervene anytime soon. Will convert to oral cipro today  Start Mg to help with constipation. Continue suppositories and bowel regimen.    Tejas Peters MD   of Medicine  [facility]  Pager [phone]     Date of service: 05/11/17

## 2017-05-11 NOTE — PROGRESS NOTES
Patient seen on 6B for evaluation and iterative programming of a [employer] Single Lead ICD per MD orders. Patient has an order for an ICD check today. Normal ICD function.  0 episodes recorded. Intrinsic rhythm = SR @ 75 bpm.  = 77.8%. OptiVol fluid index is above baseline. Battery voltage = 3.02 V and MELANIA is 2.63 V. Patient has a Sprint Reform 6949 lead. No short v-v intervals recorded.  RV lead impedance trends appear stable. Patient reports that he is feeling well and denies complaints. Plan for patient to send a remote transmission in 3 months and return to clinic in 6 months.

## 2017-05-11 NOTE — PROVIDER NOTIFICATION
Spoke with Jaqueline CRYSTAL to discuss lower end tidal Co2's on capnography. Range between 25-28 but RR remains within normal limits, denies shortness of breath or chest pain, O2 sats 98-99% on room air, breathing is unlabored. Justice is a bit sleepier this afternoon but per family, he did not sleep well last night. He is easy to wake and able to engage appropriately when awake. No new orders at this time, nursing will continue to monitor.

## 2017-05-12 ENCOUNTER — OFFICE VISIT (OUTPATIENT)
Dept: INTERPRETER SERVICES | Facility: CLINIC | Age: 67
End: 2017-05-12

## 2017-05-12 LAB
ALBUMIN SERPL-MCNC: 2.3 G/DL (ref 3.4–5)
ALP SERPL-CCNC: 347 U/L (ref 40–150)
ALT SERPL W P-5'-P-CCNC: 77 U/L (ref 0–70)
ANION GAP SERPL CALCULATED.3IONS-SCNC: 13 MMOL/L (ref 3–14)
AST SERPL W P-5'-P-CCNC: 95 U/L (ref 0–45)
BACTERIA SPEC CULT: NO GROWTH
BACTERIA SPEC CULT: NO GROWTH
BILIRUB SERPL-MCNC: 6.4 MG/DL (ref 0.2–1.3)
BUN SERPL-MCNC: 20 MG/DL (ref 7–30)
CALCIUM SERPL-MCNC: 8.6 MG/DL (ref 8.5–10.1)
CHLORIDE SERPL-SCNC: 109 MMOL/L (ref 94–109)
CO2 SERPL-SCNC: 19 MMOL/L (ref 20–32)
CREAT SERPL-MCNC: 1.75 MG/DL (ref 0.66–1.25)
ERYTHROCYTE [DISTWIDTH] IN BLOOD BY AUTOMATED COUNT: 17.3 % (ref 10–15)
GFR SERPL CREATININE-BSD FRML MDRD: 39 ML/MIN/1.7M2
GLUCOSE SERPL-MCNC: 111 MG/DL (ref 70–99)
HCT VFR BLD AUTO: 30.9 % (ref 40–53)
HGB BLD-MCNC: 10 G/DL (ref 13.3–17.7)
INR PPP: 1.6 (ref 0.86–1.14)
LMWH PPP CHRO-ACNC: 0.57 IU/ML
MCH RBC QN AUTO: 30.4 PG (ref 26.5–33)
MCHC RBC AUTO-ENTMCNC: 32.4 G/DL (ref 31.5–36.5)
MCV RBC AUTO: 94 FL (ref 78–100)
MICRO REPORT STATUS: NORMAL
MICRO REPORT STATUS: NORMAL
PLATELET # BLD AUTO: 255 10E9/L (ref 150–450)
POTASSIUM SERPL-SCNC: 3.2 MMOL/L (ref 3.4–5.3)
POTASSIUM SERPL-SCNC: 3.6 MMOL/L (ref 3.4–5.3)
PROT SERPL-MCNC: 6.3 G/DL (ref 6.8–8.8)
RBC # BLD AUTO: 3.29 10E12/L (ref 4.4–5.9)
SODIUM SERPL-SCNC: 141 MMOL/L (ref 133–144)
SPECIMEN SOURCE: NORMAL
SPECIMEN SOURCE: NORMAL
WBC # BLD AUTO: 11.8 10E9/L (ref 4–11)

## 2017-05-12 PROCEDURE — 99233 SBSQ HOSP IP/OBS HIGH 50: CPT | Performed by: INTERNAL MEDICINE

## 2017-05-12 PROCEDURE — 25000125 ZZHC RX 250: Performed by: NURSE PRACTITIONER

## 2017-05-12 PROCEDURE — 99232 SBSQ HOSP IP/OBS MODERATE 35: CPT | Mod: 25 | Performed by: NURSE PRACTITIONER

## 2017-05-12 PROCEDURE — T1013 SIGN LANG/ORAL INTERPRETER: HCPCS | Mod: U3

## 2017-05-12 PROCEDURE — 25000132 ZZH RX MED GY IP 250 OP 250 PS 637: Performed by: INTERNAL MEDICINE

## 2017-05-12 PROCEDURE — 85610 PROTHROMBIN TIME: CPT | Performed by: INTERNAL MEDICINE

## 2017-05-12 PROCEDURE — 12000006 ZZH R&B IMCU INTERMEDIATE UMMC

## 2017-05-12 PROCEDURE — 36415 COLL VENOUS BLD VENIPUNCTURE: CPT | Performed by: INTERNAL MEDICINE

## 2017-05-12 PROCEDURE — 25000132 ZZH RX MED GY IP 250 OP 250 PS 637: Performed by: PHYSICIAN ASSISTANT

## 2017-05-12 PROCEDURE — 99207 ZZC APP CREDIT; MD BILLING SHARED VISIT: CPT | Performed by: PHYSICIAN ASSISTANT

## 2017-05-12 PROCEDURE — 85027 COMPLETE CBC AUTOMATED: CPT | Performed by: INTERNAL MEDICINE

## 2017-05-12 PROCEDURE — 84132 ASSAY OF SERUM POTASSIUM: CPT | Performed by: PHYSICIAN ASSISTANT

## 2017-05-12 PROCEDURE — 85520 HEPARIN ASSAY: CPT | Performed by: INTERNAL MEDICINE

## 2017-05-12 PROCEDURE — S0138 FINASTERIDE, 5 MG: HCPCS | Performed by: INTERNAL MEDICINE

## 2017-05-12 PROCEDURE — 25000128 H RX IP 250 OP 636: Performed by: NURSE PRACTITIONER

## 2017-05-12 PROCEDURE — 80053 COMPREHEN METABOLIC PANEL: CPT | Performed by: INTERNAL MEDICINE

## 2017-05-12 PROCEDURE — 36415 COLL VENOUS BLD VENIPUNCTURE: CPT | Performed by: PHYSICIAN ASSISTANT

## 2017-05-12 RX ORDER — TAMSULOSIN HYDROCHLORIDE 0.4 MG/1
0.4 CAPSULE ORAL DAILY
Status: DISCONTINUED | OUTPATIENT
Start: 2017-05-12 | End: 2017-05-15 | Stop reason: HOSPADM

## 2017-05-12 RX ORDER — POTASSIUM CHLORIDE 1500 MG/1
60 TABLET, EXTENDED RELEASE ORAL ONCE
Status: COMPLETED | OUTPATIENT
Start: 2017-05-12 | End: 2017-05-12

## 2017-05-12 RX ORDER — WARFARIN SODIUM 5 MG/1
5 TABLET ORAL
Status: COMPLETED | OUTPATIENT
Start: 2017-05-12 | End: 2017-05-12

## 2017-05-12 RX ORDER — FINASTERIDE 1 MG/1
5 TABLET, FILM COATED ORAL DAILY
Status: DISCONTINUED | OUTPATIENT
Start: 2017-05-12 | End: 2017-05-12

## 2017-05-12 RX ORDER — FINASTERIDE 5 MG/1
5 TABLET, FILM COATED ORAL DAILY
Status: DISCONTINUED | OUTPATIENT
Start: 2017-05-12 | End: 2017-05-15 | Stop reason: HOSPADM

## 2017-05-12 RX ADMIN — PANTOPRAZOLE SODIUM 40 MG: 40 TABLET, DELAYED RELEASE ORAL at 09:10

## 2017-05-12 RX ADMIN — SIMETHICONE CHEW TAB 80 MG 80 MG: 80 TABLET ORAL at 09:21

## 2017-05-12 RX ADMIN — LOSARTAN POTASSIUM 25 MG: 25 TABLET, FILM COATED ORAL at 09:11

## 2017-05-12 RX ADMIN — HYDRALAZINE HYDROCHLORIDE 10 MG: 20 INJECTION INTRAMUSCULAR; INTRAVENOUS at 04:29

## 2017-05-12 RX ADMIN — MAGNESIUM OXIDE TAB 400 MG (241.3 MG ELEMENTAL MG) 400 MG: 400 (241.3 MG) TAB at 20:35

## 2017-05-12 RX ADMIN — LIDOCAINE 1 PATCH: 50 PATCH TOPICAL at 20:34

## 2017-05-12 RX ADMIN — MELATONIN TAB 3 MG 3 MG: 3 TAB at 21:59

## 2017-05-12 RX ADMIN — WARFARIN SODIUM 5 MG: 5 TABLET ORAL at 19:24

## 2017-05-12 RX ADMIN — Medication 100 MG: at 09:10

## 2017-05-12 RX ADMIN — LEVETIRACETAM 750 MG: 750 TABLET, FILM COATED ORAL at 20:35

## 2017-05-12 RX ADMIN — METOPROLOL SUCCINATE 25 MG: 25 TABLET, EXTENDED RELEASE ORAL at 20:35

## 2017-05-12 RX ADMIN — MAGNESIUM OXIDE TAB 400 MG (241.3 MG ELEMENTAL MG) 400 MG: 400 (241.3 MG) TAB at 11:10

## 2017-05-12 RX ADMIN — ALLOPURINOL 100 MG: 100 TABLET ORAL at 09:11

## 2017-05-12 RX ADMIN — OLOPATADINE HYDROCHLORIDE 1 DROP: 1 SOLUTION/ DROPS OPHTHALMIC at 20:40

## 2017-05-12 RX ADMIN — ACETAMINOPHEN 650 MG: 325 TABLET, FILM COATED ORAL at 06:29

## 2017-05-12 RX ADMIN — ACETAMINOPHEN 650 MG: 325 TABLET, FILM COATED ORAL at 14:10

## 2017-05-12 RX ADMIN — HYDRALAZINE HYDROCHLORIDE 10 MG: 20 INJECTION INTRAMUSCULAR; INTRAVENOUS at 20:35

## 2017-05-12 RX ADMIN — BISACODYL 10 MG: 10 SUPPOSITORY RECTAL at 10:42

## 2017-05-12 RX ADMIN — LORATADINE 10 MG: 10 TABLET ORAL at 09:11

## 2017-05-12 RX ADMIN — POTASSIUM CHLORIDE 60 MEQ: 1500 TABLET, EXTENDED RELEASE ORAL at 11:10

## 2017-05-12 RX ADMIN — HEPARIN SODIUM 850 UNITS/HR: 10000 INJECTION, SOLUTION INTRAVENOUS at 19:25

## 2017-05-12 RX ADMIN — Medication 1 CAPSULE: at 09:11

## 2017-05-12 RX ADMIN — CIPROFLOXACIN HYDROCHLORIDE 500 MG: 500 TABLET, FILM COATED ORAL at 09:11

## 2017-05-12 RX ADMIN — Medication 1 CAPSULE: at 20:35

## 2017-05-12 RX ADMIN — TAMSULOSIN HYDROCHLORIDE 0.4 MG: 0.4 CAPSULE ORAL at 14:10

## 2017-05-12 RX ADMIN — OLOPATADINE HYDROCHLORIDE 1 DROP: 1 SOLUTION/ DROPS OPHTHALMIC at 09:12

## 2017-05-12 RX ADMIN — LOSARTAN POTASSIUM 25 MG: 25 TABLET, FILM COATED ORAL at 20:35

## 2017-05-12 RX ADMIN — CIPROFLOXACIN HYDROCHLORIDE 500 MG: 500 TABLET, FILM COATED ORAL at 21:59

## 2017-05-12 RX ADMIN — SENNOSIDES AND DOCUSATE SODIUM 2 TABLET: 8.6; 5 TABLET ORAL at 09:17

## 2017-05-12 RX ADMIN — ATORVASTATIN CALCIUM 10 MG: 10 TABLET, FILM COATED ORAL at 09:11

## 2017-05-12 RX ADMIN — OXYCODONE HYDROCHLORIDE 5 MG: 5 TABLET ORAL at 06:38

## 2017-05-12 RX ADMIN — LEVETIRACETAM 750 MG: 750 TABLET, FILM COATED ORAL at 09:11

## 2017-05-12 RX ADMIN — FINASTERIDE 5 MG: 5 TABLET, FILM COATED ORAL at 14:10

## 2017-05-12 RX ADMIN — METOPROLOL SUCCINATE 50 MG: 50 TABLET, EXTENDED RELEASE ORAL at 09:11

## 2017-05-12 NOTE — PROGRESS NOTES
Brief Medicine Note    Contacted by nursing regarding loose black stools - described as grainy and black/green. Per daughter x 2 today after having a suppository earlier today. Is s/p ERCP on 5/9 w/ transpapillary cystic duct stenting to drain GB - there was gross hemobilia so some dark stools can be expected. Updated GI fellow who agrees that we are still within a timeframe where this can be seen. Hemodynamically stable and hgb this AM was stable at 10. Gold should be called if significant increase in stooling, any bright red blood, new symptoms. Would then recheck hgb and discuss again w/ GI.     Jaqueline Alfredo PA-C  Hospitalist Service  Pager [phone]

## 2017-05-12 NOTE — PLAN OF CARE
Problem: Goal Outcome Summary  Goal: Goal Outcome Summary  Outcome: Improving  Neuro: A&Ox4.   Cardiac: Intermittently paced. HR 60-70's. Hydralazine given x1 for doppler MAP of 110. No LVAD alarms past 8 hrs. Dressing c/d/i. Extremity edema 1-2+. Lasix 40mg given after K+ came back at 3.4. UOP 3 hrs post lasix was 1100mL. Heparin 10a elevated so gtt held for 30 min and decreased to 850units/hr per order. Awaiting next 10a level result.   Respiratory: Sating 99% on RA. Strong, productive, occasional cough with thick, red streaked sputum. Pt spits sputum into napkin.   GI/: Soft, incontinent, black/green stool x2. NG clamped throughout shift with no c/o discomfort. Abd distention and tightness decreased. Meds through NG as well. Mcarthur in place. Increased UOP after lasix as above.   Diet/appetite: NPO, taking un-crushable pills with sips of water and tolerating well.   Activity:  Assist of 2 repositioning in bed at least q2h. Family prefers to help with positioning. Up to chair on day shift, refused this evening.   Pain: Only c/o pain is upper, right back. Placed Lidocaine patch there.   Skin: Intact, no new deficits noted. Heels kept elevated and bony prominences protected. Turned q2h. Pillows used for support.      R: Continue with POC. Notify primary team with changes.

## 2017-05-12 NOTE — PROGRESS NOTES
"  GASTROENTEROLOGY PROGRESS NOTE    Date of Admission: 5/2/2017  Reason for Admission: abd pain      Assessment:  67 year old male with a history of chronic systolic heart failure s/p LVAD on Destination Therapy who is admitted to the hospital 5/2 for worsening RUQ abdominal pain. Initially, RUQ US showing GB wall thickness of 3.5mm along with duodenitis, surgery consulted did not think was acute cholecystitis. Subsequently had increasing leukocytosis and inflammatory markers prompting repeat imaging which showed resolved duodenitis but repeat RUQ US showing 7mm thicken GB wall.     Liver tests rising therefore patient under went ERCP with transpapillary cystic duct stenting to drain GB on 5/9. There was gross hemobilia - blood draining from bile duct and GB. No sphincterotomy done, 7F x 12cm DPT GB placed.     New changes:  Tbili decreasing. Pain controlled.       Recommendations:  OK for CLD, clamp NG  OK to continue AC  Continue bowel regimen  Pain meds, antiemetics per primary team  Discussed with primary team, Holli Geiger PA-C     Pt seen and care plan discussed with Dr. Grove GI staff physician.     Violeta Fleming PA-C  Advanced Endoscopy/Pancreaticobiliary [facility]  [facility]  Pager *[phone]  _______________________________________________________________      Subjective: Patient seen and examined at 0945. Daughter at bedside. Pain improved, reports some bloating. Wanting to try liquids. States that NGT is uncomfortable, irritating his throat. No further vomiting.    ROS:   4 pt ROS negative unless noted in subjective.     Objective:  Blood pressure 95/70, pulse 80, temperature 98.5  F (36.9  C), temperature source Axillary, resp. rate 16, height 1.727 m (5' 8\"), weight 90.5 kg (199 lb 8.3 oz), SpO2 99 %.  Gen: Mild distress  HEENT: + icterus  Resp: Clear bilaterally anteriorly  CV: RRR, LVAD audible  Abd: NT, BS+, no peritoneal signs  Ext: LE edema      PROCEDURES:  ERCP 5/9 - " Dr. Grove  Extremely difficult UGI anatomy requring endviewer and 140 sidewiewer over wire. Gross hemobilia, draining from papilla. Blood filled bile duct and gallbladder. Baloon dilated cystic duct and placed 7F 12xm double pigtail into gallbladder.     LABS:  BMP    Recent Labs  Lab 05/12/17 0618 05/11/17  1438 05/11/17  0546 05/10/17  1725 05/10/17  0417 05/09/17  0632     --  143  --  141 138   POTASSIUM 3.2* 3.4 3.2* 3.7 3.2* 3.4   CHLORIDE 109  --  115*  --  112* 109   JOSÉ 8.6  --  8.2*  --  8.2* 8.2*   CO2 19*  --  19*  --  20 18*   BUN 20  --  16  --  15 15   CR 1.75*  --  1.57*  --  1.65* 1.55*   *  --  117*  --  133* 192*     CBC    Recent Labs  Lab 05/12/17 0618 05/11/17  0546 05/10/17  0417 05/09/17  0632   WBC 11.8* 11.0 8.4 14.2*   RBC 3.29* 3.28* 3.51* 3.52*   HGB 10.0* 10.1* 10.6* 11.0*   HCT 30.9* 31.2* 32.6* 32.3*   MCV 94 95 93 92   MCH 30.4 30.8 30.2 31.3   MCHC 32.4 32.4 32.5 34.1   RDW 17.3* 17.0* 16.7* 16.5*    230 229 230     INR    Recent Labs  Lab 05/12/17 0618 05/11/17  0546 05/10/17  0417 05/09/17  0632   INR 1.60* 1.44* 1.49* 1.69*     LFTs    Recent Labs  Lab 05/12/17 0618 05/11/17  0546 05/10/17  0417 05/09/17  0632   ALKPHOS 347* 350* 352* 360*   AST 95* 128* 238* 224*   ALT 77* 93* 124* 110*   BILITOTAL 6.4* 7.0* 5.6* 5.0*   PROTTOTAL 6.3* 6.0* 6.2* 6.0*   ALBUMIN 2.3* 2.1* 2.1* 2.0*      PANC    Recent Labs  Lab 05/10/17  0417   LIPASE 98         IMAGING:  EXAMINATION: US ABDOMEN LIMITED, 5/7/2017 11:58 AM           IMPRESSION:   Increased gallbladder wall thickening of 7.4 mm, previously 3.5, and  new pericholecystic fluid, with redemonstrated gallbladder sludge.  Differential remains acalculus cholecystitis versus changes secondary  to hepatic dysfunction.         EXAMINATION: CT CHEST ABDOMEN PELVIS W/O CONTRAST 5/6/2017 4:11 PM       IMPRESSION:  1. Although limited by lack of intravenous contrast, no definitive CT  evidence of infection associated with  the left ventricular assist  device.  2. No significant change in opacities of the lower lobes, right  greater the left, which may represent atelectasis with possible  superimposed pneumonia.  3. Persistent thickening of the rectum, which may be due to stool  burden and or proctitis.   5. Moderate cardiomegaly. Mild dilatation of the main pulmonary  artery, measuring up to 3.4 cm in diameter. The latter finding may  represent some degree of underlying pulmonary arterial hypertension.  6. Near-complete resolution of previously demonstrated fat stranding  adjacent to the second portion of the duodenum.  7. Significant distention of the gallbladder, which may be due to  fasting state. No radiopaque gallstones noted. No adjacent fat  stranding or CT findings to suggest acute cholecystitis.  8. Hypoattenuating right thyroid nodule, which occupies the majority  of the gland. 9. Groundglass pulmonary nodule within the right middle  lobe, which may be infectious. No significant change from prior study  5/3/2017.

## 2017-05-12 NOTE — PROGRESS NOTES
CLINICAL NUTRITION SERVICES - ASSESSMENT NOTE     Nutrition Prescription    RECOMMENDATIONS FOR MDs/PROVIDERS TO ORDER:  If unable to advance and maintain diet >clear liquids, recommend initiate EN per recs below in 1-2 days.    Malnutrition Status:    Severe malnutrition in the context of acute on chronic illness    Recommendations already ordered by Registered Dietitian (RD):  Order Ensure Clear TID with meals to assist with kcal/pro intake while on clear liquids.     Future/Additional Recommendations:  1. Start calorie counts once diet adv >clear liquids.  2. Change ONS to Ensure Plus once able pending diet.  3. If EN becomes warranted, would recommend TwoCal HN @ 40 ml/hr (960 ml/day) + 2 pkts Beneprotein daily to provide 1970 kcals (24 kcal/kg), 93 g PRO (1.1 g/kg), 672 ml free H2O, 210 g CHO and 5 g Fiber daily.     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 He initially presented to the ED for abdominal pain secondary to duodenitis.    Acute Acalculous Cholecystitis - Underwent ERCP on 5/9 with findings of gross hemobilia s/p balloon dilation of cystic duct and gallbladder stenting. LFTs wnl on admission (5/2) with acute increase on 5/7 and persistent T.Bili elevation post-ERCP.    Recurrent abd pain and distention developed ~5/8. AXR 5/10 initially read as ileus vs developing SBO, thus NG was placed, but was later read as non-obstructive bowel gas pattern. Symptoms significantly improved s/p NG  "placement. Tolerating NG clamping and clear liquid diets today.  - Clamp NG tube. Unclamp and place to LIS if increased abdominal distention or pain. If tolerates clamping today, plan for NG removal on 5/13.  - Clear liquid diet. If tolerating tube clamping today, can ADAT to regular.     Chronic Systolic Heart Failure secondary to Ischemic Cardiomyopathy s/p ICD and HM II LVAD (10/9/2012) - As destination therapy (DT). Appears mildly hypervolemic on exam (BLE edema, elevated MAPs). Receive 1 time dose of Lasix 40 mg IV on 5/11.    CURRENT NUTRITION ORDERS  Diet: Clear Liquid  Intake/Tolerance: Appears pt has been largely Clear liquid/NPO diet since admit. Was on regular diet 5/3-5/6.    LABS  Labs reviewed  K+ - 3.2, low  Cr - 1.75, elevated appears trending up  T bili - 6.4, elevated and trended up since 5/7  Alk Phos - 347, elevated & trended up since 5/8  ALT - 77, appears trending down  AST - 95, appears trending down      MEDICATIONS  Medications reviewed  Dicksonll  Keppra  Thiamine    ANTHROPOMETRICS  Height: 172.7 cm (5' 8\")  Most Recent Weight: 90.5 kg (199 lb 8.3 oz)   IBW: 70 kg  BMI: 28.4 kg/m2 (using admit wt), Overweight  Weight History: Wt appears trended up per hx below, likely d/t fluid. Lowest wt this admit 81.6 kg on 5/2/17.  Wt Readings from Last 20 Encounters:   05/12/17 90.5 kg (199 lb 8.3 oz)   05/02/17 81.6 kg (180 lb)   04/29/17 81.6 kg (180 lb)   03/15/17 81.6 kg (180 lb)   02/21/17 82.6 kg (182 lb)   02/11/17 44.7 kg (98 lb 8 oz)   02/07/17 82.6 kg (182 lb)   10/22/16 77.1 kg (170 lb)   10/07/16 77.1 kg (170 lb)   09/17/16 77.1 kg (170 lb)   09/14/16 81.6 kg (180 lb)   08/22/16 81.6 kg (180 lb)   06/24/16 81.6 kg (180 lb)   06/17/16 81.9 kg (180 lb 8 oz)   06/11/16 82.6 kg (182 lb)   05/23/16 82.6 kg (182 lb)   05/12/16 79.4 kg (175 lb)   04/09/16 77.1 kg (170 lb)   01/15/16 81.6 kg (180 lb)   11/19/15 80.1 kg (176 lb 9.4 oz)     Dosing Weight: 82 kg (lowest actual wt this admit " 5/2)    ASSESSED NUTRITION NEEDS  Estimated Energy Needs: 1640 - 2050 kcals/day (20 - 25 kcals/kg)  Justification: Maintenance and Overweight  Estimated Protein Needs: 82 - 98 grams protein/day (1 - 1.2 grams of pro/kg)  Justification: Maintenance  Estimated Fluid Needs: per provider pending fluid status    PHYSICAL FINDINGS  See malnutrition section below.  Skin: olaf score 15, no pressure wounds noted per chart review. Monitor closely.    MALNUTRITION  % Intake: likely </= 50% for >/= 5 days (severe) d/t clear liquid/NPO status  % Weight Loss: None noted  Subcutaneous Fat Loss: Unable to assess  Muscle Loss: Unable to assess  Fluid Accumulation/Edema: Moderate per chart review  Malnutrition Diagnosis: Severe malnutrition in the context of acute on chronic illness    NUTRITION DIAGNOSIS  Inadequate protein-energy intake related to cholecystitis,  abd pain and distention requiring NGT for decompression and inability to adv >clear liquids as evidenced by likely <50% intake >=5 days and moderate fluid accumulation.      INTERVENTIONS  Implementation  Nutrition Education: Provided education on ONS   Medical food supplement therapy     Goals  1. Diet adv >clear liquids/NPO v nutrition support within 1-2 days.    2. Patient to consume % of nutritionally adequate meal trays TID, or the equivalent with supplements/snacks.     Monitoring/Evaluation  Progress toward goals will be monitored and evaluated per protocol.    Courtney Lancaster RD, SHERYL  6B pager: [phone]

## 2017-05-12 NOTE — CONSULTS
[facility]   Cardiology II Service / Advanced Heart Failure  Daily Progress Note  Date of Service: 5/12/2017      Patient: Sohan Rendon  MRN: [id number]  Admission Date: 5/2/2017  Hospital Day # 8    Assessment and Plan: Mr. 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 He initially presented to the ED for abdominal pain secondary to duodenitis. Cardiology consult requested per IM for LVAD management.      ICM s/p LVAD HM II as DT. 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   Stage D, NYHA Class III.   ACEi/ARB: Continue Losartan 25 mg po BID.   BB: Continue Toprol XL 50 mg in AM and 25 mg at hs.   Aldosterone antagonist: Contraindicated due to renal dysfunction.   SCD prophylaxis ICD  Fluid status:  Mildly hypervolemic.    MAP: 70's. PRN Hydralazine for MAP > 90 mmHg.  LDH: 548 on 5/6, baseline in the 700s-800s.   Anticoagulation: Being bridged with heparin after being reversed for his GI procedure on 5/9. INR 1.60 today, goal 1.8-2.5. Warfarin was resumed on 5/10, dosing per pharmacy.  Antiplatelet: ASA remains on hold given recurrent hemolysis/hematuria  No VAD alarms or evidence of pump malfunction.  Parameters are stable.  ================================================================    Interval History/ROS:  Spoke with daughter today and nursing today.  Patient is doing well.  No cardiac concerns at the present time.  Denies chest pain, SOB, PND, orthopnea, lightheadedness.  LE  "edema unchanged.      Last 24 hr care team notes reviewed.   ROS:  4 point ROS including Respiratory, CV, GI and , other than that noted in the HPI, is negative.     Medications: Reviewed in EPIC.     Physical Exam:   BP 97/64 (BP Location: Right arm)  Pulse 80  Temp 98.7  F (37.1  C) (Oral)  Resp 16  Ht 1.727 m (5' 8\")  Wt 90.5 kg (199 lb 8.3 oz)  SpO2 97%  BMI 30.34 kg/m2  GENERAL: Appears alert and oriented times three.   HEENT: Eye symmetrical and free of discharge bilaterally. Mucous membranes moist and without lesions.  NECK: Supple and without lymphadenopathy. JVD 10 cm.   CV: LVAD hum heard.   RESPIRATORY: Respirations regular, even, and unlabored. Lungs CTA throughout.   GI: Soft and non distended with normoactive bowel sounds present in all quadrants. No tenderness, rebound, guarding.    EXTREMITIES: 1+ peripheral edema. 2+ bilateral pedal pulses.   NEUROLOGIC: Alert and orientated x 3. No focal deficits.   MUSCULOSKELETAL: No joint swelling or tenderness.   SKIN: No jaundice. No rashes or lesions. LVAD dressing site CDI.    Data:  CMP  Recent Labs  Lab 05/12/17  0618 05/11/17  1438 05/11/17  0546 05/10/17  1725 05/10/17  0417 05/09/17  0632     --  143  --  141 138   POTASSIUM 3.2* 3.4 3.2* 3.7 3.2* 3.4   CHLORIDE 109  --  115*  --  112* 109   CO2 19*  --  19*  --  20 18*   ANIONGAP 13  --  10  --  9 10   *  --  117*  --  133* 192*   BUN 20  --  16  --  15 15   CR 1.75*  --  1.57*  --  1.65* 1.55*   GFRESTIMATED 39*  --  44*  --  42* 45*   GFRESTBLACK 47*  --  54*  --  51* 55*   JOSÉ 8.6  --  8.2*  --  8.2* 8.2*   PROTTOTAL 6.3*  --  6.0*  --  6.2* 6.0*   ALBUMIN 2.3*  --  2.1*  --  2.1* 2.0*   BILITOTAL 6.4*  --  7.0*  --  5.6* 5.0*   ALKPHOS 347*  --  350*  --  352* 360*   AST 95*  --  128*  --  238* 224*   ALT 77*  --  93*  --  124* 110*     CBC  Recent Labs  Lab 05/12/17  0618 05/11/17  0546 05/10/17  0417 05/09/17  0632   WBC 11.8* 11.0 8.4 14.2*   RBC 3.29* 3.28* 3.51* 3.52* "   HGB 10.0* 10.1* 10.6* 11.0*   HCT 30.9* 31.2* 32.6* 32.3*   MCV 94 95 93 92   MCH 30.4 30.8 30.2 31.3   MCHC 32.4 32.4 32.5 34.1   RDW 17.3* 17.0* 16.7* 16.5*    230 229 230     INR  Recent Labs  Lab 05/12/17  0618 05/11/17  0546 05/10/17  0417 05/09/17  0632   INR 1.60* 1.44* 1.49* 1.69*       Patient seen and discussed with Dr. Butler.      Vicky RANGEL, CNP  C[facility]  Pager [phone]    5/12/2017

## 2017-05-12 NOTE — PROGRESS NOTES
[facility], [place]    Internal Medicine Progress Note - Gold Service      Assessment & Plan   Sohan Rendon is a 67 year old male with a history of systolic CHF secondary to ischemic cardiomyopathy s/p HM II LVAD (DT) and ICD (2011) complicated by recurrent hemolysis and pump thrombus, multiple CVAs with residual left-sided weakness, PFO s/p closure in 10/2012, mitral regurgitation s/p MVR in 2/2012, CKD, hematuria secondary to anti-coagulation, latent TB s/p INH treatment, seizure, HTN, gout, chronic anemia, BPH, esophagitis, GERD, PUD, C.diff colitis, and recent admission (2/11-2/15) for appendicitis which was managed conservatively who was admitted on 5/2/2017 with right-sided abdominal pain secondary to acalculous cholecystitis.    Acute Acalculous Cholecystitis - Presented with right-sided abdominal pain. Abdominal US on admission (5/3) showed gallbladder sludge with non-specific mild gallbladder wall thickening w/o cholelithiasis. Evaluated by General Surgery (5/3) who did not feel exam, imaging, or history was consistent with cholecystitis. Rising CRP, Procalcitonin, and WBC prompted repeat RUQ US (5/7) which showed gallbladder wall had doubled in size (from 3.5 mm to 7.4 mm) with new pericholecystic fluid and demonstrated gallbladder sludge consistent with acalculous cholecystitis. Underwent ERCP on 5/9 with findings of gross hemobilia s/p balloon dilation of cystic duct and gallbladder stenting. LFTs wnl on admission (5/2) with acute increase on 5/7 and persistent T.Bili elevation post-ERCP. GI feels TBili elevation is due to transient obstruction from clotting blood in setting of hemobilia drainage. TBili 6.4 (7.0), Alk Phos 347 (350), ALT 77 (93), AST 95 (128) today. Afebrile. Persistent leukocytosis 5/2-5/9, resolved 5/10-5/11, and now has mild elevation in WBC 11.8 today (peak of 19.0 this admit). No lactic acidosis. Received IV Zosyn 5/7-5/11. Remainder of infectious  work-up negative to date including Blood Cultures X 4, Sputum Cx, Urine Culture, C.diff PCR, and Enteric XOCHILT.  - Continue Ciprofloxacin 500 mg BID (renally-dosed). Day #6 of antibiotics today. Resume Zosyn if clinical decompensation or fevers.  - No NSAIDs  - Zofran prn for n/v  - Tylenol 650 mg q 6h prn and Oxycodone 5 mg q 4h prn for pain  - Trend CMP and CBC on 5/13  - Appreciate GI following    Recurrent Generalized Abdominal Pain and Distention 2/2 Ileus vs Severe Constipation - Developed ~5/8. AXR (5/8) showed non-obstructive bowel pattern. Significantly improved 5/9 after passing large amount of flatus and having BM. Recurred overnight from 5/9-5/10. AXR (5/10) initially read as ileus vs developing SBO, thus NG was placed, but was later read as non-obstructive bowel gas pattern. Symptoms significantly improved s/p NG placement. Tolerating NG clamping and clear liquid diets today.  - Clamp NG tube. Unclamp and place to LIS if increased abdominal distention or pain. If tolerates clamping today, plan for NG removal on 5/13.  - Clear liquid diet. If tolerating tube clamping today, can ADAT to regular.  - Continue Mag Ox 400 mg BID  - Continue Senna-Docusate 2-3 tabs BID (hold for loose stools)  - Daily Dulcolax suppositories  - Continue Culturell 1 capsule BID  - Tap water enemas prn if no BM in 24 hours  - Simethicone prn for abdominal cramping    Chronic Anticoagulation secondary to LVAD - INR goal 1.8-2.5 in setting of recurrent hematuria. INR supra-therapeutic on admission with peak of 4.19 on 5/4 likely due to decreased PO intake. Coumadin intermittently held due to supratherapeutic INR and ERCP, most recently resumed 5/10. Receiving Heparin gtt bridging while Coumadin on hold/INR subtherapeutic. Received 2 mg IV Vitamin K on 5/7 and 1 u FFP on 5/9 for INR reversal prior to ERCP. INR 1.60 today.  - Continue Coumadin per pharmacy dosing.  - Daily INR monitoring  - Continue Heparin gtt until INR therapeutic      Chronic Systolic Heart Failure secondary to Ischemic Cardiomyopathy s/p ICD and HM II LVAD (10/9/2012) - As destination therapy (DT). Stage D, NYHA Class III. 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 Admission weight 81.6 kg. Most recent weight 90.5 kg on 5/12 (90.8 kg on 5/11). Appears mildly hypervolemic on exam (BLE edema, elevated MAPs). Receive 1 time dose of Lasix 40 mg IV on 5/11. Not on any outpatient diuretics.  - Discussed with Cards 2, will hold off on further diuresis today and reassess volume status on daily basis  - Anticoagulation as above  - LVAD settings: Flows have not been calculated, PI ~6, Speed 8800, Power ~5.5. No alarms over past 24 hours.  - Continue Losartan and Toprol-XL. Losartan dosing increased to 25 mg BID (from home dose of 25 mg QD) on 5/10 due to elevated MAPs.  - Not on ASA due to recurrent hematuria/hemolysis  - Not on aldosterone antagonist due to renal dysfunction  - Daily weights with I/Os.   - Continue home Atorvastatin  - Daily sterile dressing changes to driveline site. No evidence of driveline infection on exam or imaging.    Recurrent intermittent Gross Hematuria - Since June 2016. Previously attributed to UTI, BPH, and neurogenic bladder in setting of anticoagulation. INR goal decreased to 1.8-2.5 and ASA held as a result. Developed recurrent hematuria overnight from 5/9-5/10. Hgb stable in 10s since developement. Evaluated by Urology 5/10 who noted it is expected that intermittent gross hematuria will persist while on anti-coagulation.  - Continue Flomax per PTA dosing  - Start Finasteride 5 mg QD per Urology recommendations  - Contact Urology if thickening red urine or Hgb drop which is felt to be secondary to   blood losses  - Follow up on urine cytology/FISH (ordered by Urology for recurrent hematuria)   - Outpatient follow up with Dr. Meier for evaluation of recurrent hematuria and discuss need for repeat cystoscopy     HTN - MAP goal 65-85. MAPs persistently elevated earlier this week for which Losartan was increased to 25 mg BID on 5/10 (home dose is 25 mg QD) and 1 time dose of Lasix IV 40 mg given 5/11. MAPs gradually improving since with range of 77-95 over past 24 hours. Pain and mild hypervolemia likely contributing to elevated MAPs.  - Discussed with Cards 2, will hold off on further diuresis today and reassess volume status on daily basis.  - Continue Cozaar 25 mg BID and Toprol-XL 50 mg q AM/25 mg q PM. Hold for MAP <60.   - IV Hydralazine PRN for MAP >90 mmHG  - Avoid BP checks on left side given hemiparesis  - Monitor MAPs via BP cuff. Discontinue Doppler MAP monitoring per cards.    Acute on Chronic Normocytic Anemia - Baseline Hgb ~13-14 secondary to iron deficiency and anemia of chronic disease. Hgb 13.2 on admission. Hgb decreased to ~10-11 this admission, last 10.0 on 5/12. Evidence of gross hemobilia on ERCP and intermittent hematuria which may be contributing. No s/s of GI bleeding, though GI notes melena would not be unexpected given findings of hemobilia on ERCP with gallbladder stenting.   - Trend daily CBC. Transfuse to maintain Hgb >8.  - Resume home iron supplements once infection resolves.     Urinary Retention in setting of BPH - S/p TURP in 2014. On Flomax 0.4 mg PTA. Developed urinary retention this hospitalization s/p Mcarthur placement on 5/5. Patient requesting voiding trial due to Mcarthur-related urethral discomfort today.  - Remove Mcarthur  - Start voiding trial. Encourage timed, double voids. Bladder scan for PVR or every 4-8 hours if not voiding. Straight cath for PVR >300 mL.  - If fails voiding trial, replace Mcarthur with outpatient Urology following up 1 week after discharge to assess for  removal.  - Resume home Flomax   - Start Proscar 5 mg QD per Urology recommendations    NAGMA - Since 5/3. Likely secondary to CKD. Clinical picture not c/w RTA. No significant GI losses. Transiently resolved 5/10 and recurred as of 5/11. Bicarb stable at 19 today.   - Trend daily BMP. Consider Sodium Bicarb replacement if symptomatic or worsening.    Hypokalemia - Since 5/10. Likely due to NPO status and Lasix use. K 3.2 today.  - Give one-time dose of 60 mEq PO KCl  - Repeat K 4 hours after oral replacement (ordered for 1515)  - Trend daily BMP.  - Avoid replacement protocol in setting of CKD.    Chronic Stable Medical Conditions and Resolved Hospital Issues  Duodenitis - Admission CT Chest/Abd/Pelvis (5/5) with inflammatory fat stranding around second portion of duodenum with extension along the right anterior pararenal fascia c/w duodenitis; no localized fluid collections or free air to suggest perforation. Repeat CT (5/6) showed near complete resolution. H. Pylori stool antigen, C.diff PCR, and Enteric XOCHILT negative on 5/7. Continue Protonix 40 mg QD.  Multiple CVAs - Residual left-sided hemiparesis. Lift dependent at baseline. Continue home Atorvastatin. Defer PT/OT given patient at baseline level of mobility.  Seizure Disorder - No recent seizures. Continue PTA Keppra.  CKD - Baseline Cr ~1.6-1.8. Cr stable at 1.75 today. Avoid nephrotoxins. Renally adjust medications. Trend daily BMP.  Gout - Stable without evidence of flare. Continue PTA Allopurinol.  Seasonal Allergies - Stable. Continue Claritin daily per PTA regimen.    Diet: Clear liquid diet  Fluids: None  DVT Prophylaxis: Coumadin with Heparin gtt bridging, Protonix.   Code Status: Full Code     Disposition Plan   Expected discharge in 3-4 days pending medical stability (INR therapeutic on Coumadin, having regular BMs, tolerating regular diet, improvement in MAPs). Recommended to home with resumption of home PCA hours (16 hours/day) and home health  care.      Entered: Kelin Geiger 05/12/2017, 11:17 AM    The patient's care was discussed with the Dr. Peters (attending MD), Bedside Nurse, Cardiology, and GI.    Holli Geiger PA-C  Internal Medicine Hospitalist Service  [facility]  Pager: [phone]  Please see sticky note for cross cover information    Interval History   History obtained via . Abdominal pain and distention continue to improve today. 2 BMs on 5/11. No nausea or vomiting. Tolerating NG clamping and clear liquids this morning. Mild throat and swallowing discomfort from NG tube. Ongoing mid-back pain which is unchanged. Requesting voiding trial due to discomfort from Mcarthur. Denies any fevers, chills, headaches, dizziness, rhinorrhea, cough, chest pain, or SOB.     Data reviewed today: I reviewed all medications, new labs and imaging results over the last 24 hours. I personally reviewed CMP, CBC, INR, Heparin 10A level    Physical Exam   Vital Signs: Temp: 98.5  F (36.9  C) Temp src: Axillary BP: 95/70   Heart Rate: 67 Resp: 16 SpO2: 99 % O2 Device: None (Room air)    Weight: 199 lbs 8.26 oz  General Appearance: Chronically ill male who is awake, sitting up in bed, non-toxic appearing, and in NAD. Daughters at bedside.   HEENT: NC/AT. Anicteric sclera. NG tube in place. Mucous membranes moist.  Cardiovascular: LVAD hum. S1,S2.   Respiratory: Normal effort on room air. Lungs CTAB without wheezes, rhonchi, or crackles.  GI: Soft, non-tender, and minimally distended. Hypoactive bowel sounds.   Extremities: Trace to 1+ pitting edema of BLE. Warm and well perfused.  Skin: LVAD driveline with dressing in place which is c/d/i. No rashes or jaundice.      Data   Medications     Warfarin Therapy Reminder       HEParin 850 Units/hr (05/12/17 0909)     - MEDICATION INSTRUCTIONS -         warfarin  5 mg Oral ONCE at 18:00     magnesium oxide  400 mg Oral BID     lidocaine  1 patch Transdermal Q24h    And     lidocaine    Transdermal Q24H    And     lidocaine   Transdermal Q8H     ciprofloxacin  500 mg Oral Q12H GUS     senna-docusate  2-3 tablet Oral BID     bisacodyl  10 mg Rectal Daily     lactobacillus rhamnosus (GG)  1 capsule Oral BID     losartan  25 mg Oral BID     sodium chloride (PF)  3 mL Intracatheter Q8H     pantoprazole  40 mg Oral QAM AC     olopatadine  1 drop Both Eyes BID     allopurinol  100 mg Oral Daily     atorvastatin  10 mg Oral Daily     levETIRAcetam  750 mg Oral BID     loratadine  10 mg Oral Daily     melatonin  3 mg Oral At Bedtime     thiamine  100 mg Oral Daily     sodium chloride (PF)  3 mL Intracatheter Q8H     metoprolol  50 mg Oral Daily     metoprolol  25 mg Oral QPM     Data     Recent Labs  Lab 05/12/17  0618 05/11/17  1438 05/11/17  0546  05/10/17  0417   WBC 11.8*  --  11.0  --  8.4   HGB 10.0*  --  10.1*  --  10.6*   MCV 94  --  95  --  93     --  230  --  229   INR 1.60*  --  1.44*  --  1.49*     --  143  --  141   POTASSIUM 3.2* 3.4 3.2*  < > 3.2*   CHLORIDE 109  --  115*  --  112*   CO2 19*  --  19*  --  20   BUN 20  --  16  --  15   CR 1.75*  --  1.57*  --  1.65*   ANIONGAP 13  --  10  --  9   JOSÉ 8.6  --  8.2*  --  8.2*   *  --  117*  --  133*   ALBUMIN 2.3*  --  2.1*  --  2.1*   PROTTOTAL 6.3*  --  6.0*  --  6.2*   BILITOTAL 6.4*  --  7.0*  --  5.6*   ALKPHOS 347*  --  350*  --  352*   ALT 77*  --  93*  --  124*   AST 95*  --  128*  --  238*   LIPASE  --   --   --   --  98   < > = values in this interval not displayed.  No results found for this or any previous visit (from the past 24 hour(s)).      Attestation:  IGabe, saw and evaluated Sohan Rendon as part of a shared visit.  I have reviewed and discussed with the advanced practice provider their history, physical and plan.      I have reviewed today's care team notes, Medications, Vital Signs and Labs.     My key history or physical exam findings: No acute events. Feeling better this am. Abdomen  less distended. Stooled x2 yd.      Physical exam:  General: Alert , well nourished, in NAD  Chest/Pulmonary: breathing comfortably, no tachypnea   Cardiovascular:  trace LE edema. Good perfusion.  Skin: no diaphoresis, skin color normal     Key management decisions made by me:   Continue oral antibiotics  Restart clears  Clamp NG. If no issues with distention, will pull tomorrow.   Continue bowel regimen   Tejas Peters MD   of Medicine  Med-Peds Hospitalist  Pager [phone]     Date of service: 05/12/17

## 2017-05-12 NOTE — PROGRESS NOTES
Patient seen on 6B for evaluation and iterative programming of a [employer] Single Lead ICD per MD orders. Patient has an order for an ICD check today. Normal ICD function. 0 episodes recorded. Intrinsic rhythm = SR @ 75 bpm. = 77.8%. OptiVol fluid index is above baseline. Battery voltage = 3.02 V and MELANIA is 2.63 V. Patient has a [employer] [place] 6949 lead. No short v-v intervals recorded. RV lead impedance trends appear stable. Patient reports that he is feeling well and denies complaints. Plan for patient to send a remote transmission in 3 months and return to clinic in 6 months.

## 2017-05-12 NOTE — PLAN OF CARE
Problem: Goal Outcome Summary  Goal: Goal Outcome Summary  Outcome: No Change  Pt has been resting in bed today.  He complains of upper abdominal pain that is controlled with tylenol and oxycodone.  NG has been clamped all day and he has tolerated a whole glass of water with his pills.  He had a BM after the suppository that was soft and dark in color.  Pt wanted the truong to be DC'd so MDs agreed to trial having it out but recognize that it may need to be reinserted due to retention.  It was DC'd at 11:30, he will be due to void by 17:30. No alarms on the LVAD.  MAPS have been with in parameters. Continue to monitor.

## 2017-05-12 NOTE — PROVIDER NOTIFICATION
Time of notification: 6:00 PM  MD notified: Kimi  Patient status:New loose, grainy, black/green stools.  Per daughter, this is the second one today.  Temp:  [97.5  F (36.4  C)-98.7  F (37.1  C)] 97.5  F (36.4  C)  Heart Rate:  [64-68] 64  Resp:  [14-16] 16  BP: ()/(64-82) 109/76  SpO2:  [97 %-100 %] 97 %  Orders received:   No new orders at this time.  MD to contact GI with update.  Will continue to monitor and notify team prn

## 2017-05-13 LAB
ALBUMIN SERPL-MCNC: 2.2 G/DL (ref 3.4–5)
ALP SERPL-CCNC: 374 U/L (ref 40–150)
ALT SERPL W P-5'-P-CCNC: 66 U/L (ref 0–70)
ANION GAP SERPL CALCULATED.3IONS-SCNC: 9 MMOL/L (ref 3–14)
AST SERPL W P-5'-P-CCNC: ABNORMAL U/L (ref 0–45)
BILIRUB SERPL-MCNC: 7 MG/DL (ref 0.2–1.3)
BUN SERPL-MCNC: 21 MG/DL (ref 7–30)
CALCIUM SERPL-MCNC: 8.9 MG/DL (ref 8.5–10.1)
CHLORIDE SERPL-SCNC: 108 MMOL/L (ref 94–109)
CO2 SERPL-SCNC: 21 MMOL/L (ref 20–32)
CREAT SERPL-MCNC: 1.68 MG/DL (ref 0.66–1.25)
ERYTHROCYTE [DISTWIDTH] IN BLOOD BY AUTOMATED COUNT: 17.9 % (ref 10–15)
GFR SERPL CREATININE-BSD FRML MDRD: 41 ML/MIN/1.7M2
GLUCOSE SERPL-MCNC: 106 MG/DL (ref 70–99)
HCT VFR BLD AUTO: 30.2 % (ref 40–53)
HGB BLD-MCNC: 9.6 G/DL (ref 13.3–17.7)
INR PPP: 2.25 (ref 0.86–1.14)
LMWH PPP CHRO-ACNC: 0.3 IU/ML
MCH RBC QN AUTO: 30.2 PG (ref 26.5–33)
MCHC RBC AUTO-ENTMCNC: 31.8 G/DL (ref 31.5–36.5)
MCV RBC AUTO: 95 FL (ref 78–100)
PLATELET # BLD AUTO: 219 10E9/L (ref 150–450)
POTASSIUM SERPL-SCNC: 3.7 MMOL/L (ref 3.4–5.3)
POTASSIUM SERPL-SCNC: 5.4 MMOL/L (ref 3.4–5.3)
PROT SERPL-MCNC: 6.7 G/DL (ref 6.8–8.8)
RBC # BLD AUTO: 3.18 10E12/L (ref 4.4–5.9)
SODIUM SERPL-SCNC: 138 MMOL/L (ref 133–144)
WBC # BLD AUTO: 11 10E9/L (ref 4–11)

## 2017-05-13 PROCEDURE — 80053 COMPREHEN METABOLIC PANEL: CPT | Performed by: INTERNAL MEDICINE

## 2017-05-13 PROCEDURE — 36415 COLL VENOUS BLD VENIPUNCTURE: CPT | Performed by: INTERNAL MEDICINE

## 2017-05-13 PROCEDURE — 85520 HEPARIN ASSAY: CPT | Performed by: INTERNAL MEDICINE

## 2017-05-13 PROCEDURE — 85027 COMPLETE CBC AUTOMATED: CPT | Performed by: INTERNAL MEDICINE

## 2017-05-13 PROCEDURE — 25000132 ZZH RX MED GY IP 250 OP 250 PS 637: Performed by: INTERNAL MEDICINE

## 2017-05-13 PROCEDURE — 12000006 ZZH R&B IMCU INTERMEDIATE UMMC

## 2017-05-13 PROCEDURE — 85610 PROTHROMBIN TIME: CPT | Performed by: INTERNAL MEDICINE

## 2017-05-13 PROCEDURE — 99233 SBSQ HOSP IP/OBS HIGH 50: CPT | Performed by: INTERNAL MEDICINE

## 2017-05-13 PROCEDURE — 84132 ASSAY OF SERUM POTASSIUM: CPT | Performed by: INTERNAL MEDICINE

## 2017-05-13 PROCEDURE — 25000132 ZZH RX MED GY IP 250 OP 250 PS 637: Performed by: PHYSICIAN ASSISTANT

## 2017-05-13 PROCEDURE — S0138 FINASTERIDE, 5 MG: HCPCS | Performed by: INTERNAL MEDICINE

## 2017-05-13 PROCEDURE — 99232 SBSQ HOSP IP/OBS MODERATE 35: CPT | Performed by: NURSE PRACTITIONER

## 2017-05-13 PROCEDURE — 25000125 ZZHC RX 250: Performed by: NURSE PRACTITIONER

## 2017-05-13 RX ORDER — WARFARIN SODIUM 4 MG/1
4 TABLET ORAL
Status: COMPLETED | OUTPATIENT
Start: 2017-05-13 | End: 2017-05-13

## 2017-05-13 RX ORDER — MAGNESIUM OXIDE 400 MG/1
400 TABLET ORAL 2 TIMES DAILY
Qty: 14 TABLET | Refills: 0 | Status: SHIPPED | OUTPATIENT
Start: 2017-05-13 | End: 2017-01-01

## 2017-05-13 RX ADMIN — OLOPATADINE HYDROCHLORIDE 1 DROP: 1 SOLUTION/ DROPS OPHTHALMIC at 08:35

## 2017-05-13 RX ADMIN — Medication 100 MG: at 08:36

## 2017-05-13 RX ADMIN — ALLOPURINOL 100 MG: 100 TABLET ORAL at 08:36

## 2017-05-13 RX ADMIN — CIPROFLOXACIN HYDROCHLORIDE 500 MG: 500 TABLET, FILM COATED ORAL at 23:41

## 2017-05-13 RX ADMIN — METOPROLOL SUCCINATE 25 MG: 25 TABLET, EXTENDED RELEASE ORAL at 20:31

## 2017-05-13 RX ADMIN — Medication 1 CAPSULE: at 20:31

## 2017-05-13 RX ADMIN — MAGNESIUM OXIDE TAB 400 MG (241.3 MG ELEMENTAL MG) 400 MG: 400 (241.3 MG) TAB at 08:36

## 2017-05-13 RX ADMIN — LOSARTAN POTASSIUM 25 MG: 25 TABLET, FILM COATED ORAL at 20:31

## 2017-05-13 RX ADMIN — PANTOPRAZOLE SODIUM 40 MG: 40 TABLET, DELAYED RELEASE ORAL at 08:36

## 2017-05-13 RX ADMIN — LEVETIRACETAM 750 MG: 750 TABLET, FILM COATED ORAL at 20:31

## 2017-05-13 RX ADMIN — FINASTERIDE 5 MG: 5 TABLET, FILM COATED ORAL at 08:35

## 2017-05-13 RX ADMIN — ATORVASTATIN CALCIUM 10 MG: 10 TABLET, FILM COATED ORAL at 08:36

## 2017-05-13 RX ADMIN — LORATADINE 10 MG: 10 TABLET ORAL at 08:36

## 2017-05-13 RX ADMIN — CIPROFLOXACIN HYDROCHLORIDE 500 MG: 500 TABLET, FILM COATED ORAL at 10:23

## 2017-05-13 RX ADMIN — Medication 1 CAPSULE: at 08:35

## 2017-05-13 RX ADMIN — TAMSULOSIN HYDROCHLORIDE 0.4 MG: 0.4 CAPSULE ORAL at 12:08

## 2017-05-13 RX ADMIN — ACETAMINOPHEN 650 MG: 325 TABLET, FILM COATED ORAL at 08:57

## 2017-05-13 RX ADMIN — LOSARTAN POTASSIUM 25 MG: 25 TABLET, FILM COATED ORAL at 08:36

## 2017-05-13 RX ADMIN — SENNOSIDES AND DOCUSATE SODIUM 3 TABLET: 8.6; 5 TABLET ORAL at 08:35

## 2017-05-13 RX ADMIN — MELATONIN TAB 3 MG 3 MG: 3 TAB at 22:02

## 2017-05-13 RX ADMIN — LEVETIRACETAM 750 MG: 750 TABLET, FILM COATED ORAL at 08:36

## 2017-05-13 RX ADMIN — HYDRALAZINE HYDROCHLORIDE 10 MG: 20 INJECTION INTRAMUSCULAR; INTRAVENOUS at 23:45

## 2017-05-13 RX ADMIN — OLOPATADINE HYDROCHLORIDE 1 DROP: 1 SOLUTION/ DROPS OPHTHALMIC at 20:33

## 2017-05-13 RX ADMIN — LIDOCAINE 1 PATCH: 50 PATCH TOPICAL at 20:31

## 2017-05-13 RX ADMIN — METOPROLOL SUCCINATE 50 MG: 50 TABLET, EXTENDED RELEASE ORAL at 08:36

## 2017-05-13 RX ADMIN — WARFARIN SODIUM 4 MG: 4 TABLET ORAL at 17:41

## 2017-05-13 RX ADMIN — MAGNESIUM OXIDE TAB 400 MG (241.3 MG ELEMENTAL MG) 400 MG: 400 (241.3 MG) TAB at 20:31

## 2017-05-13 ASSESSMENT — PAIN DESCRIPTION - DESCRIPTORS: DESCRIPTORS: DISCOMFORT;ACHING

## 2017-05-13 NOTE — DISCHARGE SUMMARY
[facility]  Internal Medicine Discharge Summary    Sohan Rendon MRN# [id number]   YOB: 1950 Age: 67 year old     Date of Admission:  5/2/2017  Date of Discharge:  5/15/2017  Admitting Physician:  Selene Firas MD  Discharge Physician:  Thomas Clay  Discharging Service:  [facility]     Primary Provider: Thomas Dill    Dear Dr. Dill,    Thank you for involving us in the care of Sohan Rendon at [facility]            Outpatient To Do:   - Follow up on urine cytology/FISH (ordered by Urology for recurrent hematuria)   - Outpatient follow up with Dr. Meier with Urology for truong removal, evaluation of recurrent hematuria and discuss need for repeat cystoscopy          Discharge Diagnosis:   Acute acalculous cholecystitis  Biliary obstruction  Ileus   Duodenitis  Chronic Systolic Heart Failure secondary to Ischemic Cardiomyopathy with LVAD  Hypertension  Recurrent gross hematuria  Acute on Chronic Normocytic Anemia. Blood loss from GB likely.                 Discharge Disposition:   Discharged to home           Condition on Discharge:   Discharge condition: Stable   Code status on discharge: Full Code           Procedures:   ERCP with biliary stenting          Discharge Medications:     Current Discharge Medication List      START taking these medications    Details   lactobacillus rhamnosus, GG, (CULTURELL) capsule Take 1 capsule by mouth 2 times daily for 11 days  Qty: 22 capsule, Refills: 0    Associated Diagnoses: Diarrhea, unspecified type; Acute cholecystitis      ciprofloxacin (CIPRO) 500 MG tablet Take 1 tablet (500 mg) by mouth every 12 hours for 8 days  Qty: 16 tablet, Refills: 0    Associated Diagnoses: Acute cholecystitis      bisacodyl (DULCOLAX) 10 MG Suppository Place 1 suppository (10 mg) rectally daily as needed for constipation  Qty: 5 suppository, Refills: 1    Associated Diagnoses: Drug-induced constipation      !! finasteride  (PROSCAR) 5 MG tablet Take 1 tablet (5 mg) by mouth daily  Qty: 30 tablet, Refills: 3    Associated Diagnoses: Incomplete bladder emptying      magnesium oxide (MAG-OX) 400 MG tablet Take 1 tablet (400 mg) by mouth 2 times daily  Qty: 14 tablet, Refills: 0    Associated Diagnoses: Abdominal distention; Drug-induced constipation      !! finasteride (PROSCAR) 5 MG tablet Take 1 tablet (5 mg) by mouth daily  Qty: 90 tablet, Refills: 3    Associated Diagnoses: Incomplete bladder emptying       !! - Potential duplicate medications found. Please discuss with provider.      CONTINUE these medications which have NOT CHANGED    Details   atorvastatin (LIPITOR) 10 MG tablet TAKE 1 TABLET (10 MG) BY MOUTH DAILY  Qty: 90 tablet, Refills: 3    Associated Diagnoses: Hyperlipidemia LDL goal <100      warfarin (COUMADIN) 3 MG tablet Take 3mg by mouth on Mondays and Thursdays. Take 4.5mg by mouth all other days of the week.  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, subsequent encounter      melatonin 3 MG tablet Take 1 tablet (3 mg) by mouth At Bedtime    Associated Diagnoses: Insomnia, unspecified type      Thiamine HCl (VITAMIN B-1) 100 MG TABS TAKE 1 TABLET (100 MG) BY MOUTH DAILY  Qty: 90 tablet, Refills: 3    Associated Diagnoses: Cerebrovascular accident (CVA) due to embolism of right middle cerebral artery (H)      losartan (COZAAR) 25 MG tablet Take 1 tablet (25 mg) by mouth daily  Qty: 45 tablet,  Refills: 3    Associated Diagnoses: CHF (congestive heart failure), NYHA class IV, chronic, systolic (H)      allopurinol (ZYLOPRIM) 100 MG tablet Take 1 tablet (100 mg) by mouth daily  Qty: 90 tablet, Refills: 3    Associated Diagnoses: 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, unspecified site      pantoprazole (PROTONIX) 40 MG enteric coated tablet Take 1 tablet (40 mg) by mouth daily  Qty: 180 tablet, Refills: 3    Associated Diagnoses: Gastrointestinal hemorrhage associated with chronic superficial gastritis      tamsulosin (FLOMAX) 0.4 MG 24 hr capsule Take 1 capsule (0.4 mg) by mouth daily  Qty: 90 capsule, Refills: 3    Associated Diagnoses: Incomplete bladder emptying      senna-docusate (SENOKOT-S;PERICOLACE) 8.6-50 MG per tablet Take 1-2 tablets by mouth 2 times daily as needed for constipation  Qty: 60 tablet, Refills: 3    Associated Diagnoses: Slow transit constipation      azelastine (OPTIVAR) 0.05 % SOLN Apply 1 drop to eye 2 times daily  Qty: 1 Bottle, Refills: 11      loratadine (CLARITIN) 10 MG tablet Take 1 tablet (10 mg) by mouth daily  Qty: 90 tablet, Refills: 3    Associated Diagnoses: Allergic rhinitis, unspecified allergic rhinitis type      levETIRAcetam (KEPPRA) 750 MG tablet Take 1 tablet (750 mg) by mouth 2 times daily  Qty: 180 tablet, Refills: 3    Associated Diagnoses: Convulsions, unspecified convulsion type (H)      ascorbic acid (VITAMIN C) 500 MG tablet Take 1 tablet (500 mg) by mouth daily With iron pill  Qty: 90 tablet, Refills: 3      !! metoprolol (TOPROL-XL) 25 MG 24 hr tablet Take 1 tablet (25 mg) by mouth every evening  Qty: 90 tablet, Refills: 3    Associated Diagnoses: LVAD (left ventricular assist device) present (H)      !! metoprolol (TOPROL-XL) 50 MG 24 hr tablet Take 1 tablet (50 mg) by mouth daily  Qty: 90 tablet, Refills: 3      acetaminophen (TYLENOL) 325 MG tablet Take 2 tablets (650 mg) by mouth every 6 hours  Qty: 100 tablet, Refills: 0    Associated Diagnoses:  Femoral neck fracture, right, closed, initial encounter      simethicone (MYLICON) 80 MG chewable tablet Take 1 tablet (80 mg) by mouth 4 times daily  Qty: 180 tablet, Refills: 5    Associated Diagnoses: Slow transit constipation      !! order for DME Equipment being ordered: Cushioned heel boots.  Qty: 2 each, Refills: 0    Associated Diagnoses: 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)      guaiFENesin-dextromethorphan (ROBITUSSIN DM) 100-10 MG/5ML syrup Take 5 mLs by mouth every 4 hours as needed for cough  Qty: 560 mL, Refills: 3    Associated Diagnoses: Chronic cough      !! order for DME Equipment being ordered: Wheelchair, manual, with elevated leg rests and tilt in space back.  [facility] I called them 11/26/16 and they requested the new order.  Face to face is in my 10/26/16 note.  Qty: 1 each, Refills: 0    Associated Diagnoses: 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); Displaced fracture of right femoral neck, closed, with nonunion, subsequent encounter      !! order for DME Equipment being ordered: Wheelchair, manual.  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 order for DME Equipment being ordered: Hospital Bed, fully electric.  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  Qty: 1 Box, Refills: 3    Associated Diagnoses: Type 2 diabetes mellitus with stage 3 chronic kidney disease (H)      !! order for DME Equipment being ordered:  Reclining manual w/c with bilateral elevating leg rests.  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  Qty: 60 g, Refills: 3      carboxymethylcellulose (REFRESH PLUS) 0.5 % SOLN Place 1 drop into the right eye 3 times daily as needed for other (eye irritation or discomfort.)  Qty: 30 mL, Refills: 3    Associated Diagnoses: Dry eye syndrome of bilateral lacrimal glands      !! order for DME Equipment being ordered: Bilateral leg supports for manual wheelchair.  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  Qty: 1 Box, Refills: 3    Associated Diagnoses: Diabetes mellitus, type 2 (H)      !! ORDER FOR DME Equipment being ordered: Lift chair.    Please see indications and face-to-face encounter details in 2/3/15 Office Visit note.  Qty: 1 Device, Refills: 0    Associated Diagnoses: Fracture of femoral neck, right, closed, initial encounter; Stroke (H); CHF (congestive heart failure), NYHA class IV (H)      bisacodyl (DULCOLAX) 5 MG EC tablet Take 1  tablet (5 mg) by mouth daily as needed for constipation  Qty: 20 tablet, Refills: 1    Associated Diagnoses: Constipation       !! - Potential duplicate medications found. Please discuss with provider.      STOP taking these medications       ondansetron (ZOFRAN ODT) 4 MG ODT tab Comments:   Reason for Stopping:         ferrous sulfate (IRON) 325 (65 FE) MG tablet Comments:   Reason for Stopping:                     Consultations:   Consultation during this admission received from cardiology, gastroenterology and urology             Brief History of Illness:     67 year old man with chronic systolic heart failure 2/2 to ischemic CM s/p HM II LVAD placement in [place] now Destination Therapy due to multiple CVA's with residual left sided weakness c/b recurrent hemolysis and pump thrombus, CKD, who was recently admitted on 2/11-2/15 for appendicitis, managed conservatively; now presents with right sided abdominal pain. The pain started on Friday afternoon after he ate outside at a restaurant. The pain has been pretty much persistent since. The daughter describes the pain as sharp, localized to right upper and middle abdomen with associated nausea, with 2-3 episodes of vomiting since. The pain is worse with deep inspiration, simethicone helps with the pain somewhat. She denies any fever, chills or sweats but he did have markedly reduced appetite. He was seen in ER on 4/29 and was discharged after CT abdomen was unremarrkable. The pain and nausea got much worse today so he presented to the ED. He also had three loose bowel movements since Friday, with no blood in them. Family denies any black tarry stools. He is bed bound, did not complain of any shortness of breath at rest. He does have mildly productive cough for last 1-2 days. No runny nose or upper respiratory symptoms. No known sick contacts.     For full admission details, please see H&P dated 5/2/2017           Hospital Course:   # Acute Acalculous  Cholecystitis: Presented with right-sided abdominal pain. Abdominal US on admission (5/3) showed gallbladder sludge with non-specific mild gallbladder wall thickening w/o cholelithiasis. Evaluated by General Surgery (5/3) who did not feel exam, imaging, or history were initially consistent with cholecystitis. Rising CRP, Procalcitonin, and WBC prompted repeat RUQ US (5/7) which showed gallbladder wall had doubled in size (from 3.5 mm to 7.4 mm) with new pericholecystic fluid and demonstrated gallbladder sludge consistent with acalculous cholecystitis. Surgery did not feel he was a good surgical candidate. He underwent ERCP/transpapillary stent placement on 5/9 with findings of gross hemobilia s/p balloon dilation of cystic duct and gallbladder stenting. Received IV Zosyn 5/7-5/11. Converted to oral cipro on 5/11. Will complete 14 day course from time of source control (5/23). Remainder of infectious work-up negative to date including Blood Cultures X 4, Sputum Cx, Urine Culture, C.diff PCR, and Enteric XOCHILT.     # Persistent biliary obstruction: LFTs wnl on admission (5/2) with acute increase on 5/7 and persistent T.Bili elevation post-ERCP. GI feels TBili elevation is due to transient obstruction from clotting blood in setting of hemobilia drainage. Trended down and were 1.9 at the time of discharge.      # Recurrent mild ileus vs Severe Constipation: Developed abdominal distention, constipation, some nausea on 5/8. AXR (5/8) showed non-obstructive bowel pattern. Significantly improved 5/9 after passing large amount of flatus and having BM. Recurred overnight from 5/9-5/10. AXR (5/10) initially read as ileus vs developing SBO, thus NG was placed, but was later read as non-obstructive bowel gas pattern. Symptoms significantly improved s/p NG placement and with addition of magnesium supplementation. NG was clamped on 5/12 and removed 5/13. Diet was advanced to regular. He was continued on an aggressive bowel regimen.      Duodenitis: Admission CT Chest/Abd/Pelvis (5/5) with inflammatory fat stranding around second portion of duodenum with extension along the right anterior pararenal fascia c/w duodenitis; no localized fluid collections or free air to suggest perforation. Repeat CT (5/6) showed near complete resolution. H. Pylori stool antigen, C.diff PCR, and Enteric XOCHILT negative on 5/7. Continue Protonix 40 mg QD.      # Concern for GI bleed: Blackish stool on 5/12. Hemoglobin remained stable around 10. GI felt this was likely is passing old blood after procedure. Discussed on day of admission. Went to 8.4 then back up to 8.7.      # Chronic Anticoagulation secondary to LVAD: INR goal 1.8-2.5 in setting of recurrent hematuria. Coumadin intermittently held due to supratherapeutic INR and ERCP, most recently resumed 5/10. Heparin gtt bridging while Coumadin on hold/INR subtherapeutic. Received 2 mg IV Vitamin K on 5/7 and 1 u FFP on 5/9 for INR reversal prior to ERCP. Heparin discontinued once INR in goal range.       # Chronic Systolic Heart Failure secondary to Ischemic Cardiomyopathy s/p ICD and HM II LVAD (10/9/2012): As destination therapy (DT). Stage D, NYHA Class III. 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 Admission weight 81.6 kg. Most recent weight 90.5 kg on 5/12 (90.8 kg on 5/11). Appears mildly hypervolemic on exam (BLE edema, elevated MAPs). Receive 1 time dose of Lasix 40 mg IV on 5/11. Not on any outpatient diuretics.  - LVAD settings: Flows have not been calculated, PI ~6, Speed 8800, Power ~5.5. No alarms over past 24 hours.  - Continue Losartan and Toprol-XL. Losartan dosing increased to 25 mg BID (from home dose of 25 mg QD) on 5/10 due to elevated MAPs.  - Not  on ASA due to recurrent hematuria/hemolysis  - Not on aldosterone antagonist due to renal dysfunction  - Continue home Atorvastatin      # Recurrent intermittent Gross Hematuria - Since June 2016. Previously attributed to UTI, BPH, and neurogenic bladder in setting of anticoagulation. INR goal decreased to 1.8-2.5 and ASA held as a result. Developed recurrent hematuria overnight from 5/9-5/10. Hgb stable in 10s since developement. Evaluated by Urology 5/10 who noted it is expected that intermittent gross hematuria will persist while on anti-coagulation.  - Continue Flomax per PTA dosing  - Started Finasteride 5 mg QD per Urology recommendations  - Follow up on urine cytology/FISH (ordered by Urology for recurrent hematuria)   - Outpatient follow up with Dr. Meier for evaluation of recurrent hematuria and discuss need for repeat cystoscopy       # HTN - MAP goal 65-85. MAPs persistently elevated earlier this week for which Losartan was increased to 25 mg BID on 5/10 (home dose is 25 mg QD) and 1 time dose of Lasix IV 40 mg given 5/11. MAPs gradually improved Losartan dosing increased to 25 mg BID (from home dose of 25 mg QD) due to elevated MAPs. Continued Toprol-XL 50 mg q AM/25 mg q PM.       # Acute on Chronic Normocytic Anemia: Baseline Hgb ~13-14 secondary to iron deficiency and anemia of chronic disease. Hgb 13.2 on admission. Hgb decreased to ~10-11 this admission, was 8.7 on day of discharge.Again, GI notes melena would not be unexpected given findings of hemobilia on ERCP with gallbladder stenting.   - Resume home iron supplements once infection resolves.       # Urinary Retention in setting of BPH: S/p TURP in 2014. Developed urinary retention this hospitalization s/p Truong placement on 5/5. Truong removed 5/12. However, continued to need intermittent straight cath for retention so truong was replaced. Discharging on flomax and new prescription for proscar 5 mg daily per urology. Will need urology follow up  "1 week after discharge to assess for removal.      # NAGMA - Since 5/3. Likely secondary to CKD. Clinical picture not c/w RTA. No significant GI losses. Resolved.       # Hypokalemia: Likely due to NPO status and Lasix use. Resolved.       Chronic Stable Medical Conditions and Resolved Hospital Issues    Multiple CVAs - Residual left-sided hemiparesis. Lift dependent at baseline.   Seizure Disorder - No recent seizures. Continue Keppra.  CKD - Baseline Cr ~1.6-1.8. Cr remained stable at baseline  Gout - Stable without evidence of flare. Continue Allopurinol.  Seasonal Allergies - Stable. Continue Claritin            Discharge Exam:   /57 (BP Location: Right arm)  Pulse 80  Temp 97.7  F (36.5  C) (Oral)  Resp 16  Ht 1.727 m (5' 8\")  Wt 90.5 kg (199 lb 8.3 oz)  SpO2 99%  BMI 30.34 kg/m2  EXAM:  General: Awake, afebrile, NT/NAD  HEENT: NCAT, EOMI, MMM  CV: RRR, nml S1S2, no murmurs, warm/well perfused, 2+ peripheral pulses  Resp: CTAB  Abdomen: Soft NT,ND, no rebound or guarding  Skin: No rashes or jaundice.               Significant Results:   Lab Results   Component Value Date    WBC 9.5 05/14/2017    HGB 8.7 (L) 05/15/2017    HCT 29.6 (L) 05/14/2017     05/14/2017     05/14/2017    POTASSIUM 3.6 05/14/2017    CHLORIDE 108 05/14/2017    CO2 22 05/14/2017    BUN 19 05/14/2017    CR 1.68 (H) 05/14/2017     (H) 05/14/2017    SED 12 05/03/2017    NTBNPI 508 05/27/2014    NTBNP 1687 (H) 11/11/2015    TROPONIN 0.00 10/11/2014    TROPI  05/03/2017     <0.015  The 99th percentile for upper reference range is 0.045 ug/L.  Troponin values in   the range of 0.045 - 0.120 ug/L may be associated with risks of adverse   clinical events.      AST 38 05/15/2017    ALT 39 05/15/2017    ALKPHOS 294 (H) 05/15/2017    BILITOTAL 1.9 (H) 05/15/2017    MANISHA 38 08/30/2015    INR 2.51 (H) 05/15/2017                   Pending Results:   Unresulted Labs Ordered in the Past 30 Days of this Admission     No " orders found from 3/3/2017 to 5/3/2017.                  Discharge Instructions and Follow-Up:     Discharge Procedure Orders  Home care nursing referral   Referral Type: Home Health Therapies & Aides     Reason for your hospital stay   Order Comments: You were admitted to the hospital for a gallbladder blockage and infection. This was addressed with stenting of the gallbladder duct and antibiotics.     Adult [facility] Follow-up and recommended labs and tests   Order Comments: Follow up with Dr. Dill with  Complex [facility] within 1 week for hospital follow-up. Recommend repeating CBC and CMP at that time.  Follow up with Dr. Meier with Urology within 1 to 2 months  to evaluate medication change and ongoing treatment for hematuria and BPH.  Resume care with cardiology clinic.     Appointments on [place] and/or [facility] (with [facility] or [facility] provider or service). Call [phone] if you haven't heard regarding these appointments within 7 days of discharge.     Activity   Order Comments: Your activity upon discharge: up by lift with assist   Order Specific Question Answer Comments   Is discharge order? Yes      Monitor and record   Order Comments: blood pressure daily  weight every day     When to contact your care team   Order Comments: Call your primary doctor if you have any of the following: temperature greater than 100.4,  increased shortness of breath, increased swelling in your legs, vomiting, constipation, or increased abdominal pain.     Full Code     Diet   Order Comments: Follow this diet upon discharge:      Snacks/Supplements Adult: Ensure Clear; With Meals     Low Saturated Fat Na <2400 mg   Order Specific Question Answer Comments   Is discharge order? Yes         Other f/u labs: Needs INR on 5/17.     Thank you for allowing our team to assist in your patient's care.  If there are any questions or concerns, please do not hesitate to reach us at any time.      Time spent on patient: 40 minutes  total including face to face and coordinating care time reviewing current illness, any medication changes, the care plan for today, and this note.    ERNESTINA QUINTANILLA MD, [facility]  Internal Medicine Hospitalist & Staff Physician  [facility]  Pager: [phone]  [email or web address]

## 2017-05-13 NOTE — PLAN OF CARE
"Problem: Goal Outcome Summary  Goal: Goal Outcome Summary  Outcome: No Change  /72 (BP Location: Right arm)  Pulse 80  Temp 98.6  F (37  C)  Resp 16  Ht 1.727 m (5' 8\")  Wt 90.5 kg (199 lb 8.3 oz)  SpO2 97%  BMI 30.34 kg/m2     Neuro: A&Ox4. Daughter assisted with communication and cares.  Cardiac: LVAD with no alarms. VSS. PRN hydralazine given x1 for MAP >90      Respiratory: Sating >95% on RA. Coughing up small amount of red streaked sputum.  GI/: Voiding spontaneously. PVR >300 x2. Straight cath x2 per orders. No BM, no signs of bleeding noted. NG clamped, no c/o nausea.  Activity:  Assist of 2, repositioning Q2H.  Pain: At acceptable level on current regimen. Denied pain overnight.  Skin: Intact, no new deficits noted.     R: Continue with POC. Notify primary team with changes.             "

## 2017-05-13 NOTE — PLAN OF CARE
Problem: Goal Outcome Summary  Goal: Goal Outcome Summary  Outcome: No Change  Pt VSS; Paced with HR in the 80s; RA with sats in the mid 90s. A&O x4. No complaints of pain. HMII LVAD numbers WNL; no alarms. Dressing changed. Productive cough with red tinged sputum.NG removed and and tolerating solid food well. Pt unable to void this AM; bladder scan at 1200 showed 400cc. Straight cath x1. BM+; loose black stool. Hgb stable and MD aware. Daughter in room; assisting with most of cares. Repositioning q2h. Plan to discharge within the next few days. Continue to monitor and notify team with changes.

## 2017-05-13 NOTE — CONSULTS
[facility]   Cardiology II Service / Advanced Heart Failure  Daily Progress Note  Date of Service: 5/13/2017      Patient: Sohan Rendon  MRN: [id number]  Admission Date: 5/2/2017  Hospital Day # 9    Assessment and Plan: Mr. 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 He initially presented to the ED for abdominal pain secondary to duodenitis. Cardiology consult requested per IM for LVAD management.      ICM s/p LVAD HM II as DT. 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   Stage D, NYHA Class III.   ACEi/ARB: Continue Losartan 25 mg po BID.   BB: Continue Toprol XL 50 mg in AM and 25 mg at hs.   Aldosterone antagonist: Contraindicated due to renal dysfunction.   SCD prophylaxis ICD  Fluid status:  Euvolemic, no need for diuretics.    MAP: well-controlled in the 70's-80's.   LDH: 548 on 5/6, baseline in the 700s-800s.   Anticoagulation: Being bridged with heparin, INR therapuetic at 2.25 today, goal 1.8-2.5. Heparin gtt d/c'd today.  Antiplatelet: ASA remains on hold given recurrent hemolysis/hematuria.    Cardiology will sign off at this time; please contact us if you have any further questions regarding this patient.  ================================================================    Interval History/ROS:  Patient is feeling better today and had his NG tube removed. Abdomen feels much better with only mild residual discomfort. Denies chest pain, SOB, edema, palpitations, or dizziness.  "    Last 24 hr care team notes reviewed.   ROS:  4 point ROS including Respiratory, CV, GI and , other than that noted in the HPI, is negative.   No VAD alarms or evidence of pump malfunction.  Parameters are stable.    Medications: Reviewed in EPIC.     Physical Exam:   /57 (BP Location: Right arm)  Pulse 80  Temp 97.7  F (36.5  C) (Oral)  Resp 16  Ht 1.727 m (5' 8\")  Wt 90.5 kg (199 lb 8.3 oz)  SpO2 99%  BMI 30.34 kg/m2  GENERAL: Appears alert and oriented times three.   HEENT: Eye symmetrical and free of discharge bilaterally. Mucous membranes moist and without lesions.  NECK: Supple and without lymphadenopathy. JVD 8 cm.   CV: Mechanical LVAD hum heard.   RESPIRATORY: Respirations regular, even, and unlabored. Lungs CTA throughout.   GI: Soft and non distended with normoactive bowel sounds present in all quadrants. No tenderness, rebound, guarding.    EXTREMITIES: Trace bilateral foot edema. 2+ bilateral pedal pulses.   NEUROLOGIC: Alert and orientated x 3. No focal deficits.   MUSCULOSKELETAL: No joint swelling or tenderness.   SKIN: No jaundice. No rashes or lesions. LVAD dressing site CDI.    Data:  CMP  Recent Labs  Lab 05/13/17  1052 05/13/17  0708 05/12/17  1539 05/12/17  0618  05/11/17  0546  05/10/17  0417   NA  --  138  --  141  --  143  --  141   POTASSIUM 3.7 5.4* 3.6 3.2*  < > 3.2*  < > 3.2*   CHLORIDE  --  108  --  109  --  115*  --  112*   CO2  --  21  --  19*  --  19*  --  20   ANIONGAP  --  9  --  13  --  10  --  9   GLC  --  106*  --  111*  --  117*  --  133*   BUN  --  21  --  20  --  16  --  15   CR  --  1.68*  --  1.75*  --  1.57*  --  1.65*   GFRESTIMATED  --  41*  --  39*  --  44*  --  42*   GFRESTBLACK  --  50*  --  47*  --  54*  --  51*   JOSÉ  --  8.9  --  8.6  --  8.2*  --  8.2*   PROTTOTAL  --  6.7*  --  6.3*  --  6.0*  --  6.2*   ALBUMIN  --  2.2*  --  2.3*  --  2.1*  --  2.1*   BILITOTAL  --  7.0*  --  6.4*  --  7.0*  --  5.6*   ALKPHOS  --  374*  --  347*  --  350*  -- "  352*   AST  --  Unsatisfactory specimen - hemolyzed Unsatisfactory specimen - hemolyzedMI ROGELIO AT 0820 05/13/17 BY FI  --  95*  --  128*  --  238*   ALT  --  66  --  77*  --  93*  --  124*   < > = values in this interval not displayed.  CBC    Recent Labs  Lab 05/13/17  0708 05/12/17 0618 05/11/17  0546 05/10/17  0417   WBC 11.0 11.8* 11.0 8.4   RBC 3.18* 3.29* 3.28* 3.51*   HGB 9.6* 10.0* 10.1* 10.6*   HCT 30.2* 30.9* 31.2* 32.6*   MCV 95 94 95 93   MCH 30.2 30.4 30.8 30.2   MCHC 31.8 32.4 32.4 32.5   RDW 17.9* 17.3* 17.0* 16.7*    255 230 229     INR    Recent Labs  Lab 05/13/17  0708 05/12/17 0618 05/11/17 0546 05/10/17  0417   INR 2.25* 1.60* 1.44* 1.49*       Patient discussed with Dr. Butler.      Nathalie Whitaker, APRN, CNP  Pager [phone]

## 2017-05-13 NOTE — PROGRESS NOTES
[facility] - Internal Medicine Daily Note   Date of Service: 05/13/2017    Patient: Sohan Rendon  MRN: [id number]  Admission Date: 5/2/2017  Hospital Day # 9    Assessment & Plan:   Sohan Rendon is a 67 year old male with a history of systolic CHF secondary to ischemic cardiomyopathy s/p HM II LVAD (DT) and ICD (2011) complicated by recurrent hemolysis and pump thrombus, multiple CVAs with residual left-sided weakness, PFO s/p closure in 10/2012, mitral regurgitation s/p MVR in 2/2012, CKD, hematuria secondary to anti-coagulation, latent TB s/p INH treatment, seizure, HTN, gout, chronic anemia, BPH, esophagitis, GERD, PUD, C.diff colitis, and recent admission (2/11-2/15) for appendicitis which was managed conservatively who was admitted on 5/2/2017 with right-sided abdominal pain secondary to acalculous cholecystitis.     # Acute Acalculous Cholecystitis: Difficult diagnosis. Presented with right-sided abdominal pain. Abdominal US on admission (5/3) showed gallbladder sludge with non-specific mild gallbladder wall thickening w/o cholelithiasis. Evaluated by General Surgery (5/3) who did not feel exam, imaging, or history was consistent with cholecystitis. Rising CRP, Procalcitonin, and WBC prompted repeat RUQ US (5/7) which showed gallbladder wall had doubled in size (from 3.5 mm to 7.4 mm) with new pericholecystic fluid and demonstrated gallbladder sludge consistent with acalculous cholecystitis. Underwent ERCP/transpapillary stent placement on 5/9 with findings of gross hemobilia s/p balloon dilation of cystic duct and gallbladder stenting. ersistent leukocytosis 5/2-5/9, resolved 5/10-5/11, and now has mild elevation in 10 - 12 range (peak of 19.0 this admit). No lactic acidosis. Received IV Zosyn 5/7-5/11. Remainder of infectious work-up negative to date including Blood Cultures X 4, Sputum Cx, Urine Culture, C.diff PCR, and Enteric XOCHILT.  - No NSAIDs  - Zofran prn for n/v  - Tylenol 650 mg q 6h prn and  Oxycodone 5 mg q 4h prn for pain  - Trend CBC    # Persistent biliary obstruction: LFTs wnl on admission (5/2) with acute increase on 5/7 and persistent T.Bili elevation post-ERCP. GI feels TBili elevation is due to transient obstruction from clotting blood in setting of hemobilia drainage.  Afebrile. P- Continue Ciprofloxacin 500 mg BID (renally-dosed). Day #6 of antibiotics today. Resume Zosyn if clinical decompensation or fevers.  - Trend CMP  - Appreciate GI following     # Recurrent mild ileus vs Severe Constipation: Developed ~5/8. AXR (5/8) showed non-obstructive bowel pattern. Significantly improved 5/9 after passing large amount of flatus and having BM. Recurred overnight from 5/9-5/10. AXR (5/10) initially read as ileus vs developing SBO, thus NG was placed, but was later read as non-obstructive bowel gas pattern. Symptoms significantly improved s/p NG placement. Now appears resolved. Tolerating NG clamping and clear liquid diet. Stooling.   - Remove NG tube.   - ADAT to regular.  - Continue Mag Ox 400 mg BID  - Continue Senna-Docusate 2-3 tabs BID (hold for loose stools)  - Daily Dulcolax suppositories  - Continue Culturell 1 capsule BID  - Tap water enemas prn if no BM in 24 hours  - Simethicone prn for abdominal cramping    # Concern for GI bleed: Blackish stool on 5/12. Hemoglobin stable around 10. GI feels likely is passing old blood after procedure.   - CTM hemoglobin     # Chronic Anticoagulation secondary to LVAD: INR goal 1.8-2.5 in setting of recurrent hematuria. Coumadin intermittently held due to supratherapeutic INR and ERCP, most recently resumed 5/10. Heparin gtt bridging while Coumadin on hold/INR subtherapeutic. Received 2 mg IV Vitamin K on 5/7 and 1 u FFP on 5/9 for INR reversal prior to ERCP. INR now in goal range  - D/c heparin gtt  - Continue Coumadin per pharmacy dosing.  - Daily INR monitoring     # Chronic Systolic Heart Failure secondary to Ischemic Cardiomyopathy s/p ICD and HM  II LVAD (10/9/2012) - As destination therapy (DT). Stage D, NYHA Class III. 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 Admission weight 81.6 kg. Most recent weight 90.5 kg on 5/12 (90.8 kg on 5/11). Appears mildly hypervolemic on exam (BLE edema, elevated MAPs). Receive 1 time dose of Lasix 40 mg IV on 5/11. Not on any outpatient diuretics.  - reassess volume status on daily basis  - Anticoagulation as above  - LVAD settings: Flows have not been calculated, PI ~6, Speed 8800, Power ~5.5. No alarms over past 24 hours.  - Continue Losartan and Toprol-XL. Losartan dosing increased to 25 mg BID (from home dose of 25 mg QD) on 5/10 due to elevated MAPs.  - Not on ASA due to recurrent hematuria/hemolysis  - Not on aldosterone antagonist due to renal dysfunction  - Daily weights with I/Os.   - Continue home Atorvastatin  - Daily sterile dressing changes to driveline site. No evidence of driveline infection on exam or imaging.     # Recurrent intermittent Gross Hematuria - Since June 2016. Previously attributed to UTI, BPH, and neurogenic bladder in setting of anticoagulation. INR goal decreased to 1.8-2.5 and ASA held as a result. Developed recurrent hematuria overnight from 5/9-5/10. Hgb stable in 10s since developement. Evaluated by Urology 5/10 who noted it is expected that intermittent gross hematuria will persist while on anti-coagulation.  - Continue Flomax per PTA dosing  - Start Finasteride 5 mg QD per Urology recommendations  - Contact Urology if thickening red urine or Hgb drop which is felt to be secondary to  blood losses  - Follow up on urine cytology/FISH (ordered by Urology for recurrent hematuria)   - Outpatient follow up with Dr. Meier for evaluation of  recurrent hematuria and discuss need for repeat cystoscopy      # HTN - MAP goal 65-85. MAPs persistently elevated earlier this week for which Losartan was increased to 25 mg BID on 5/10 (home dose is 25 mg QD) and 1 time dose of Lasix IV 40 mg given 5/11. MAPs gradually improving since with range of 73-92 over past 24 hours. Pain and mild hypervolemia likely contributing to elevated MAPs.  - continue to assess volume status on daily basis.  - Continue Cozaar 25 mg BID and Toprol-XL 50 mg q AM/25 mg q PM. Hold for MAP <60.   - IV Hydralazine PRN for MAP >90 mmHG  - Avoid BP checks on left side given hemiparesis  - Monitor MAPs via BP cuff. Discontinue Doppler MAP monitoring per cards.     # Acute on Chronic Normocytic Anemia - Baseline Hgb ~13-14 secondary to iron deficiency and anemia of chronic disease. Hgb 13.2 on admission. Hgb decreased to ~10-11 this admission, last 10.0 on 5/12. Evidence of gross hemobilia on ERCP and intermittent hematuria which may be contributing. Again, GI notes melena would not be unexpected given findings of hemobilia on ERCP with gallbladder stenting.   - Trend daily CBC. Transfuse to maintain Hgb >8.  - Resume home iron supplements once infection resolves.      # Urinary Retention in setting of BPH - S/p TURP in 2014. On Flomax 0.4 mg PTA. Developed urinary retention this hospitalization s/p Mcarthur placement on 5/5. Patient requesting voiding trial due to Mcarthur-related urethral discomfort today.  - Remove Mcarthur  - Start voiding trial. Encourage timed, double voids. Bladder scan for PVR or every 4-8 hours if not voiding. Straight cath for PVR >300 mL.  - If fails voiding trial, replace Mcarthur with outpatient Urology following up 1 week after discharge to assess for removal.  - Continue  home Flomax   - Start Proscar 5 mg QD per Urology recommendations     # NAGMA - Since 5/3. Likely secondary to CKD. Clinical picture not c/w RTA. No significant GI losses. Transiently resolved 5/10 and  recurred as of 5/11. Improving.    - Trend daily BMP. Consider Sodium Bicarb replacement if symptomatic or worsening.     # Hypokalemia - Since 5/10. Likely due to NPO status and Lasix use. K actually high at 5.4 today but doubt validitiy.   - Repeat K today  - Trend daily BMP.  - Avoid replacement protocol in setting of CKD.    # Dispo: to home once tolerating full diet, bili trending down. Anticipate 2-3 days.      Chronic Stable Medical Conditions and Resolved Hospital Issues  Duodenitis - Admission CT Chest/Abd/Pelvis (5/5) with inflammatory fat stranding around second portion of duodenum with extension along the right anterior pararenal fascia c/w duodenitis; no localized fluid collections or free air to suggest perforation. Repeat CT (5/6) showed near complete resolution. H. Pylori stool antigen, C.diff PCR, and Enteric XOCHILT negative on 5/7. Continue Protonix 40 mg QD.  Multiple CVAs - Residual left-sided hemiparesis. Lift dependent at baseline. Continue home Atorvastatin. Defer PT/OT given patient at baseline level of mobility.  Seizure Disorder - No recent seizures. Continue PTA Keppra.  CKD - Baseline Cr ~1.6-1.8. Cr stable at 1.75 today. Avoid nephrotoxins. Renally adjust medications. Trend daily BMP.  Gout - Stable without evidence of flare. Continue PTA Allopurinol.  Seasonal Allergies - Stable. Continue Claritin daily per PTA regimen.     Diet: Clear liquid diet. ADAT  Fluids: None  DVT Prophylaxis: Coumadin with Heparin gtt bridging, Protonix.   Code Status: Full Code      Pt's care was discussed with bedside RN, patient, and family with  during Care Team Rounds.    Tejas Peters MD   of Medicine  Med-Peds Hospitalist  Pager [phone]    Team: [facility]  Page Cross Cover after 5 pm: pager [phone]     ___________________________________________________________________    Subjective & Interval Hx:    Had greenish/black stool yesterday evening. Tolerating NG clamping.  "Tolerating CLD. Stooled x2. Good UOP. Pain improved.     Last 24 hr care team notes reviewed.   ROS:  4 point ROS including Respiratory, CV, GI and , other than that noted in the HPI, is negative      Medications: Reviewed in EPIC. List below for reference    Physical Exam:    Blood pressure 113/57, pulse 80, temperature 97.7  F (36.5  C), temperature source Oral, resp. rate 16, height 1.727 m (5' 8\"), weight 90.5 kg (199 lb 8.3 oz), SpO2 99 %.    General: Awake, alert, pleasant, NAD  HEENT: NG in place, anicteric  Chest/Resp: CTAB, non-labored  Heart/CV: LVAD sounds, good perfusion. Trace LE edema,   Abdomen/GI: soft, NT, NABS, drive line in place in RUQ      Lines/Tubes:   Peripheral IV 05/05/17 Right;Anterior;Medial Lower forearm (Active)   Site Assessment [facility] 5/13/2017  4:00 AM   Line Status Infusing 5/13/2017  4:00 AM   Phlebitis Scale 0-->no symptoms 5/13/2017  4:00 AM   Infiltration Scale 0 5/13/2017  4:00 AM   Extravasation? No 5/12/2017  8:00 PM   Dressing Intervention New dressing  5/5/2017 11:00 AM   Number of days:8       Peripheral IV 05/08/17 Right;Posterior;Medial Upper forearm (Active)   Site Assessment [facility] 5/13/2017  8:00 AM   Line Status Infusing 5/13/2017  8:00 AM   Phlebitis Scale 0-->no symptoms 5/13/2017  8:00 AM   Infiltration Scale 0 5/13/2017  8:00 AM   Extravasation? No 5/13/2017  8:00 AM   Dressing Intervention New dressing  5/8/2017 11:00 AM   Number of days:5       Labs & Studies of Note:  I personally reviewed all labs and imaging.   Unresulted Labs Ordered in the Past 30 Days of this Admission     No orders found from 3/3/2017 to 5/3/2017.          Medications list for Reference   Current Facility-Administered Medications   Medication     tamsulosin (FLOMAX) capsule 0.4 mg     finasteride (PROSCAR) tablet 5 mg     magnesium oxide (MAG-OX) tablet 400 mg     lidocaine (LIDODERM) 5 % Patch 1 patch    And     lidocaine (LIDODERM) patch REMOVAL    And     lidocaine (LIDODERM) Patch in " Place     ciprofloxacin (CIPRO) tablet 500 mg     senna-docusate (SENOKOT-S;PERICOLACE) 8.6-50 MG per tablet 2-3 tablet     bisacodyl (DULCOLAX) Suppository 10 mg     lactobacillus rhamnosus (GG) (CULTURELL) capsule 1 capsule     Warfarin Therapy Reminder (Check START DATE - warfarin may be starting in the FUTURE)     losartan (COZAAR) tablet 25 mg     benzocaine-menthol (CHLORASEPTIC) 6-10 MG lozenge 1 lozenge     benzocaine (HURRICAINE/TOPEX) 20 % Unit Dose spray     lidocaine 1 % 1 mL     lidocaine (LMX4) kit     sodium chloride (PF) 0.9% PF flush 3 mL     sodium chloride (PF) 0.9% PF flush 3 mL     sodium chloride (PF) 0.9% PF flush 3 mL     heparin  drip 25,000 units in 0.45% NaCl 250 mL (see additional administration details for dose)     heparin bolus from infusion pump     ondansetron (ZOFRAN) injection 4 mg    Or     ondansetron (ZOFRAN-ODT) ODT tab 4 mg     hydrALAZINE (APRESOLINE) injection 10 mg     carboxymethylcellulose (REFRESH PLUS) 0.5 % ophthalmic solution 1 drop     pantoprazole (PROTONIX) EC tablet 40 mg     olopatadine (PATANOL) 0.1 % ophthalmic solution 1 drop     acetaminophen (TYLENOL) tablet 650 mg     allopurinol (ZYLOPRIM) tablet 100 mg     atorvastatin (LIPITOR) tablet 10 mg     bisacodyl (DULCOLAX) EC tablet 5 mg     levETIRAcetam (KEPPRA) tablet 750 mg     loratadine (CLARITIN) tablet 10 mg     melatonin tablet 3 mg     simethicone (MYLICON) chewable tablet 80 mg     thiamine tablet 100 mg     naloxone (NARCAN) injection 0.1-0.4 mg     lidocaine 1 % 1 mL     lidocaine (LMX4) kit     sodium chloride (PF) 0.9% PF flush 3 mL     sodium chloride (PF) 0.9% PF flush 3 mL     Patient is already receiving anticoagulation with heparin, enoxaparin (LOVENOX), warfarin (COUMADIN)  or other anticoagulant medication     metoprolol (TOPROL-XL) 24 hr tablet 50 mg     metoprolol (TOPROL-XL) 24 hr tablet 25 mg     oxyCODONE (ROXICODONE) IR tablet 5 mg

## 2017-05-14 LAB
ALBUMIN SERPL-MCNC: 2.5 G/DL (ref 3.4–5)
ALP SERPL-CCNC: 391 U/L (ref 40–150)
ALT SERPL W P-5'-P-CCNC: 54 U/L (ref 0–70)
ANION GAP SERPL CALCULATED.3IONS-SCNC: 9 MMOL/L (ref 3–14)
AST SERPL W P-5'-P-CCNC: 59 U/L (ref 0–45)
BILIRUB SERPL-MCNC: 2.7 MG/DL (ref 0.2–1.3)
BUN SERPL-MCNC: 19 MG/DL (ref 7–30)
CALCIUM SERPL-MCNC: 8.8 MG/DL (ref 8.5–10.1)
CHLORIDE SERPL-SCNC: 108 MMOL/L (ref 94–109)
CO2 SERPL-SCNC: 22 MMOL/L (ref 20–32)
CREAT SERPL-MCNC: 1.68 MG/DL (ref 0.66–1.25)
ERYTHROCYTE [DISTWIDTH] IN BLOOD BY AUTOMATED COUNT: 18 % (ref 10–15)
GFR SERPL CREATININE-BSD FRML MDRD: 41 ML/MIN/1.7M2
GLUCOSE SERPL-MCNC: 117 MG/DL (ref 70–99)
HCT VFR BLD AUTO: 29.6 % (ref 40–53)
HGB BLD-MCNC: 9.5 G/DL (ref 13.3–17.7)
INR PPP: 2.68 (ref 0.86–1.14)
MCH RBC QN AUTO: 30.4 PG (ref 26.5–33)
MCHC RBC AUTO-ENTMCNC: 32.1 G/DL (ref 31.5–36.5)
MCV RBC AUTO: 95 FL (ref 78–100)
PLATELET # BLD AUTO: 220 10E9/L (ref 150–450)
POTASSIUM SERPL-SCNC: 3.6 MMOL/L (ref 3.4–5.3)
PROT SERPL-MCNC: 6.8 G/DL (ref 6.8–8.8)
RBC # BLD AUTO: 3.12 10E12/L (ref 4.4–5.9)
SODIUM SERPL-SCNC: 139 MMOL/L (ref 133–144)
WBC # BLD AUTO: 9.5 10E9/L (ref 4–11)

## 2017-05-14 PROCEDURE — 99233 SBSQ HOSP IP/OBS HIGH 50: CPT | Performed by: INTERNAL MEDICINE

## 2017-05-14 PROCEDURE — 80053 COMPREHEN METABOLIC PANEL: CPT | Performed by: INTERNAL MEDICINE

## 2017-05-14 PROCEDURE — 85610 PROTHROMBIN TIME: CPT | Performed by: INTERNAL MEDICINE

## 2017-05-14 PROCEDURE — 12000006 ZZH R&B IMCU INTERMEDIATE UMMC

## 2017-05-14 PROCEDURE — T1013 SIGN LANG/ORAL INTERPRETER: HCPCS | Mod: U3

## 2017-05-14 PROCEDURE — 85027 COMPLETE CBC AUTOMATED: CPT | Performed by: INTERNAL MEDICINE

## 2017-05-14 PROCEDURE — 25000132 ZZH RX MED GY IP 250 OP 250 PS 637: Performed by: INTERNAL MEDICINE

## 2017-05-14 PROCEDURE — 25000132 ZZH RX MED GY IP 250 OP 250 PS 637: Performed by: PHYSICIAN ASSISTANT

## 2017-05-14 PROCEDURE — 36415 COLL VENOUS BLD VENIPUNCTURE: CPT | Performed by: INTERNAL MEDICINE

## 2017-05-14 PROCEDURE — S0138 FINASTERIDE, 5 MG: HCPCS | Performed by: INTERNAL MEDICINE

## 2017-05-14 RX ORDER — LACTOBACILLUS RHAMNOSUS GG 10B CELL
1 CAPSULE ORAL 2 TIMES DAILY
Qty: 22 CAPSULE | Refills: 0 | Status: SHIPPED | OUTPATIENT
Start: 2017-05-15 | End: 2017-05-26

## 2017-05-14 RX ORDER — BISACODYL 10 MG
10 SUPPOSITORY, RECTAL RECTAL DAILY PRN
Qty: 5 SUPPOSITORY | Refills: 1 | Status: SHIPPED | OUTPATIENT
Start: 2017-05-14

## 2017-05-14 RX ORDER — CIPROFLOXACIN 500 MG/1
500 TABLET, FILM COATED ORAL EVERY 12 HOURS
Qty: 16 TABLET | Refills: 0 | Status: SHIPPED | OUTPATIENT
Start: 2017-05-15 | End: 2017-05-23

## 2017-05-14 RX ORDER — FINASTERIDE 5 MG/1
5 TABLET, FILM COATED ORAL DAILY
Qty: 30 TABLET | Refills: 3 | Status: SHIPPED | OUTPATIENT
Start: 2017-05-14 | End: 2017-06-14

## 2017-05-14 RX ADMIN — OLOPATADINE HYDROCHLORIDE 1 DROP: 1 SOLUTION/ DROPS OPHTHALMIC at 11:18

## 2017-05-14 RX ADMIN — Medication 100 MG: at 11:18

## 2017-05-14 RX ADMIN — OLOPATADINE HYDROCHLORIDE 1 DROP: 1 SOLUTION/ DROPS OPHTHALMIC at 20:04

## 2017-05-14 RX ADMIN — LEVETIRACETAM 750 MG: 750 TABLET, FILM COATED ORAL at 20:10

## 2017-05-14 RX ADMIN — PANTOPRAZOLE SODIUM 40 MG: 40 TABLET, DELAYED RELEASE ORAL at 08:00

## 2017-05-14 RX ADMIN — FINASTERIDE 5 MG: 5 TABLET, FILM COATED ORAL at 11:17

## 2017-05-14 RX ADMIN — ACETAMINOPHEN 650 MG: 325 TABLET, FILM COATED ORAL at 01:48

## 2017-05-14 RX ADMIN — ALLOPURINOL 100 MG: 100 TABLET ORAL at 11:18

## 2017-05-14 RX ADMIN — CIPROFLOXACIN HYDROCHLORIDE 500 MG: 500 TABLET, FILM COATED ORAL at 14:00

## 2017-05-14 RX ADMIN — LEVETIRACETAM 750 MG: 750 TABLET, FILM COATED ORAL at 11:18

## 2017-05-14 RX ADMIN — METOPROLOL SUCCINATE 25 MG: 25 TABLET, EXTENDED RELEASE ORAL at 20:07

## 2017-05-14 RX ADMIN — TAMSULOSIN HYDROCHLORIDE 0.4 MG: 0.4 CAPSULE ORAL at 13:59

## 2017-05-14 RX ADMIN — SENNOSIDES AND DOCUSATE SODIUM 1 TABLET: 8.6; 5 TABLET ORAL at 14:00

## 2017-05-14 RX ADMIN — Medication 1 CAPSULE: at 20:05

## 2017-05-14 RX ADMIN — LIDOCAINE 1 PATCH: 50 PATCH TOPICAL at 20:07

## 2017-05-14 RX ADMIN — ACETAMINOPHEN 650 MG: 325 TABLET, FILM COATED ORAL at 11:17

## 2017-05-14 RX ADMIN — MAGNESIUM OXIDE TAB 400 MG (241.3 MG ELEMENTAL MG) 400 MG: 400 (241.3 MG) TAB at 20:07

## 2017-05-14 RX ADMIN — ATORVASTATIN CALCIUM 10 MG: 10 TABLET, FILM COATED ORAL at 11:18

## 2017-05-14 RX ADMIN — LORATADINE 10 MG: 10 TABLET ORAL at 11:18

## 2017-05-14 RX ADMIN — LOSARTAN POTASSIUM 25 MG: 25 TABLET, FILM COATED ORAL at 11:17

## 2017-05-14 RX ADMIN — LOSARTAN POTASSIUM 25 MG: 25 TABLET, FILM COATED ORAL at 20:07

## 2017-05-14 RX ADMIN — METOPROLOL SUCCINATE 50 MG: 50 TABLET, EXTENDED RELEASE ORAL at 11:17

## 2017-05-14 RX ADMIN — MAGNESIUM OXIDE TAB 400 MG (241.3 MG ELEMENTAL MG) 400 MG: 400 (241.3 MG) TAB at 11:18

## 2017-05-14 RX ADMIN — SENNOSIDES AND DOCUSATE SODIUM 1 TABLET: 8.6; 5 TABLET ORAL at 18:10

## 2017-05-14 RX ADMIN — Medication 1 CAPSULE: at 11:17

## 2017-05-14 ASSESSMENT — PAIN DESCRIPTION - DESCRIPTORS: DESCRIPTORS: ACHING

## 2017-05-14 NOTE — PLAN OF CARE
"Problem: Goal Outcome Summary  Goal: Goal Outcome Summary  Outcome: Improving  BP 95/73 (BP Location: Right arm)  Pulse 63  Temp 98.4  F (36.9  C) (Axillary)  Resp 16  Ht 1.727 m (5' 8\")  Wt 86.5 kg (190 lb 11.2 oz)  SpO2 100%  BMI 29 kg/m2     Neuro: A&Ox4.   Cardiac: Paced, LVAD with no alarms. VSS. Hydralazine given x1 for MAP >90.       Respiratory: Sating >95% on RA.  GI/: Adequate urine output. No BM.   Diet/appetite: Tolerating low Na/fat diet. Fair appetite.  Activity:  Assist of 2, with repositioning. Daughter at bedside assisting with cares.   Pain: At acceptable level on current regimen.    Skin: Intact, no new deficits noted.     R: Continue with POC. Notify primary team with changes.          "

## 2017-05-14 NOTE — PROGRESS NOTES
[facility] - Internal Medicine Daily Note   Date of Service: 05/14/2017    Patient: Sohan Rendon  MRN: [id number]  Admission Date: 5/2/2017  Hospital Day # 10    Assessment & Plan:   Sohan Rendon is a 67 year old male with a history of systolic CHF secondary to ischemic cardiomyopathy s/p HM II LVAD (DT) and ICD (2011) complicated by recurrent hemolysis and pump thrombus, multiple CVAs with residual left-sided weakness, PFO s/p closure in 10/2012, mitral regurgitation s/p MVR in 2/2012, CKD, hematuria secondary to anti-coagulation, latent TB s/p INH treatment, seizure, HTN, gout, chronic anemia, BPH, esophagitis, GERD, PUD, C.diff colitis, and recent admission (2/11-2/15) for appendicitis which was managed conservatively who was admitted on 5/2/2017 with right-sided abdominal pain secondary to acalculous cholecystitis.     # Acute Acalculous Cholecystitis: Difficult diagnosis. Presented with right-sided abdominal pain. Abdominal US on admission (5/3) showed gallbladder sludge with non-specific mild gallbladder wall thickening w/o cholelithiasis. Evaluated by General Surgery (5/3) who did not feel exam, imaging, or history was consistent with cholecystitis. Rising CRP, Procalcitonin, and WBC prompted repeat RUQ US (5/7) which showed gallbladder wall had doubled in size (from 3.5 mm to 7.4 mm) with new pericholecystic fluid and demonstrated gallbladder sludge consistent with acalculous cholecystitis. Underwent ERCP/transpapillary stent placement on 5/9 with findings of gross hemobilia s/p balloon dilation of cystic duct and gallbladder stenting.   - Continue cipro.500 mg BID (renally-dosed). Day 8 of antibiotics. Day 6 since source control. Unclear duration at this point. Likely 10 - 14 days depending on trend of biliary obstruction.  - No NSAIDs  - Zofran prn for n/v  - Tylenol 650 mg q 6h prn and Oxycodone 5 mg q 4h prn for pain  - Trend CBC    # Persistent biliary obstruction: LFTs wnl on admission (5/2)  with acute increase on 5/7 and persistent T.Bili elevation post-ERCP. GI feels TBili elevation is due to transient obstruction from clotting blood in setting of hemobilia drainage. Peaked at 7. Much improved today at 2.7    - Continue Ciprofloxacin   - Trend CMP  - Appreciate GI following     # Recurrent mild ileus vs Severe Constipation: Resolved. Tolerating full diet. Stooling.   - Continue regular diet.  - Continue Mag Ox 400 mg BID  - Continue Senna-Docusate 2-3 tabs BID (hold for loose stools)  - Daily Dulcolax suppositories  - Continue Culturell 1 capsule BID  - Tap water enemas prn if no BM in 24 hours  - Simethicone prn for abdominal cramping    # Concern for GI bleed: Blackish stool on 5/12. Hemoglobin stable around 10. GI feels likely is passing old blood after procedure.   - CTM hemoglobin     # Chronic Anticoagulation secondary to LVAD: INR goal 1.8-2.5 in setting of recurrent hematuria. Coumadin intermittently held due to supratherapeutic INR and ERCP, most recently resumed 5/10. Heparin gtt bridging while Coumadin on hold/INR subtherapeutic. Received 2 mg IV Vitamin K on 5/7 and 1 u FFP on 5/9 for INR reversal prior to ERCP. INR now in goal range  - Continue Coumadin per pharmacy dosing.  - Daily INR monitoring     # Chronic Systolic Heart Failure secondary to Ischemic Cardiomyopathy s/p ICD and HM II LVAD (10/9/2012): As destination therapy (DT). Stage D, NYHA Class III. 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 Admission weight 81.6 kg. Most recent weight 90.5 kg on 5/12 (90.8 kg on 5/11). Appears mildly hypervolemic on exam (BLE edema, elevated MAPs). Received 1 time dose of Lasix 40 mg IV on 5/11. Not on any outpatient diuretics.  - reassess volume  status on daily basis. Still having some LE edema but not severe.  - Anticoagulation as above  - LVAD settings: Flows have not been calculated, PI ~6, Speed 8800, Power ~5.5. No alarms over past 24 hours.  - Continue Losartan and Toprol-XL. Losartan dosing increased to 25 mg BID (from home dose of 25 mg QD) on 5/10 due to elevated MAPs.  - Not on ASA due to recurrent hematuria/hemolysis  - Not on aldosterone antagonist due to renal dysfunction  - Daily weights with I/Os.   - Continue home Atorvastatin  - Daily sterile dressing changes to driveline site. No evidence of driveline infection on exam or imaging.    # HTN: MAP goal 65-85. MAPs persistently elevated earlier this week for which Losartan was increased to 25 mg BID on 5/10 (home dose is 25 mg QD) and 1 time dose of Lasix IV 40 mg given 5/11. MAPs gradually improving since with range of 80-92 over past 24 hours. Pain and mild hypervolemia likely contributing to elevated MAPs.  - continue to assess volume status on daily basis.  - Continue Cozaar 25 mg BID and Toprol-XL 50 mg q AM/25 mg q PM. Hold for MAP <60.   - IV Hydralazine PRN for MAP >90 mmHG  - Avoid BP checks on left side given hemiparesis  - Monitor MAPs via BP cuff. Discontinue Doppler MAP monitoring per cards.     # Recurrent intermittent Gross Hematuria: Since June 2016. Previously attributed to UTI, BPH, and neurogenic bladder in setting of anticoagulation. INR goal decreased to 1.8-2.5 and ASA held as a result. Developed recurrent hematuria overnight from 5/9-5/10. Evaluated by Urology 5/10 who noted it is expected that intermittent gross hematuria will persist while on anti-coagulation. Resolved for now.   - Continue Flomax per PTA dosing  - Finasteride 5 mg QD per Urology recommendations (new medication this admission)  - Contact Urology if thickening red urine or Hgb drop which is felt to be secondary to  blood losses  - Follow up on urine cytology/FISH (ordered by Urology for recurrent  hematuria)   - Outpatient follow up with Dr. Meier for evaluation of recurrent hematuria and discuss need for repeat cystoscopy      # Acute on Chronic Normocytic Anemia: Baseline Hgb ~13-14 secondary to iron deficiency and anemia of chronic disease. Hgb 13.2 on admission. Hgb decreased to ~10-11 this admission.. Evidence of gross hemobilia on ERCP and intermittent hematuria which may be contributing. Again, GI notes melena would not be unexpected given findings of hemobilia on ERCP with gallbladder stenting.   - Trend daily CBC. Transfuse to maintain Hgb >8.  - Resume home iron supplements once infection resolves.      # Urinary Retention in setting of BPH: S/p TURP in 2014. On Flomax 0.4 mg PTA. Developed urinary retention this hospitalization s/p Mcarthur placement on 5/5. Mcarthur removed 5/12. However continued to need straight cath for PVRs > 600 at times.   - Mcarthur replaced 5/14. Will need f/u with urology in 1 week.   - Intermittent straight cath PRN.  - Continue  home Flomax   - Continue Proscar 5 mg QD per Urology recommendations     # NAGMA - Since 5/3. Likely secondary to CKD. Clinical picture not c/w RTA. No significant GI losses. Improving.    - Trend daily BMP. Consider Sodium Bicarb replacement if symptomatic or worsening.     # Hypokalemia: Improving.   - Repeat K today  - Trend daily BMP.  - Avoid replacement protocol in setting of CKD.    # Dispo: to home once tolerating full diet, hemoglobin stable, bili trending down. Anticipate tomorrow pending hgb stability and bili trend.      Chronic Stable Medical Conditions and Resolved Hospital Issues  Duodenitis - Admission CT Chest/Abd/Pelvis (5/5) with inflammatory fat stranding around second portion of duodenum with extension along the right anterior pararenal fascia c/w duodenitis; no localized fluid collections or free air to suggest perforation. Repeat CT (5/6) showed near complete resolution. H. Pylori stool antigen, C.diff PCR, and Enteric XOCHILT  "negative on 5/7. Continue Protonix 40 mg QD.  Multiple CVAs - Residual left-sided hemiparesis. Lift dependent at baseline. Continue home Atorvastatin. Defer PT/OT given patient at baseline level of mobility.  Seizure Disorder - No recent seizures. Continue PTA Keppra.  CKD - Baseline Cr ~1.6-1.8. Cr stable within baseline. Avoid nephrotoxins. Renally adjust medications. Trend daily BMP.  Gout - Stable without evidence of flare. Continue PTA Allopurinol.  Seasonal Allergies - Stable. Continue Claritin daily per PTA regimen.     Diet: Low fat, low Na  Fluids: None  DVT Prophylaxis: Coumadin, Protonix.   Code Status: Full Code      Pt's care was discussed with bedside RN, patient, and family with  during Care Team Rounds.    Tejas Peters MD   of Medicine  Med-Peds Hospitalist  Pager [phone]    Team: Medicine [facility] 3  Page Cross Cover after 5 pm: pager [phone]     ___________________________________________________________________    Subjective & Interval Hx:    No acute events. Continues to have greenish/black stools but hgb stable. Tolerated NG removal. Good UOP but continues to retain and need straight cathing. Pain improved.     Last 24 hr care team notes reviewed.   ROS:  4 point ROS including Respiratory, CV, GI and , other than that noted in the HPI, is negative      Medications: Reviewed in EPIC. List below for reference    Physical Exam:    Blood pressure 105/73, pulse 63, temperature 97.6  F (36.4  C), temperature source Axillary, resp. rate 16, height 1.727 m (5' 8\"), weight 86.5 kg (190 lb 11.2 oz), SpO2 100 %.    General: Awake, alert, pleasant, NAD  HEENT: MMM, anicteric  Chest/Resp: CTAB, non-labored  Heart/CV: LVAD sounds, good perfusion. 1+ LE edema to ankles,   Abdomen/GI: soft, NT, NABS, drive line in place in RUQ      Lines/Tubes:   Peripheral IV 05/05/17 Right;Anterior;Medial Lower forearm (Active)   Site Assessment [facility] 5/13/2017  4:00 AM   Line Status Infusing " 5/13/2017  4:00 AM   Phlebitis Scale 0-->no symptoms 5/13/2017  4:00 AM   Infiltration Scale 0 5/13/2017  4:00 AM   Extravasation? No 5/12/2017  8:00 PM   Dressing Intervention New dressing  5/5/2017 11:00 AM   Number of days:8       Peripheral IV 05/08/17 Right;Posterior;Medial Upper forearm (Active)   Site Assessment [facility] 5/13/2017  8:00 AM   Line Status Infusing 5/13/2017  8:00 AM   Phlebitis Scale 0-->no symptoms 5/13/2017  8:00 AM   Infiltration Scale 0 5/13/2017  8:00 AM   Extravasation? No 5/13/2017  8:00 AM   Dressing Intervention New dressing  5/8/2017 11:00 AM   Number of days:5       Labs & Studies of Note:  I personally reviewed all labs and imaging.   Unresulted Labs Ordered in the Past 30 Days of this Admission     No orders found from 3/3/2017 to 5/3/2017.          Medications list for Reference   Current Facility-Administered Medications   Medication     tamsulosin (FLOMAX) capsule 0.4 mg     finasteride (PROSCAR) tablet 5 mg     magnesium oxide (MAG-OX) tablet 400 mg     lidocaine (LIDODERM) 5 % Patch 1 patch    And     lidocaine (LIDODERM) patch REMOVAL    And     lidocaine (LIDODERM) Patch in Place     ciprofloxacin (CIPRO) tablet 500 mg     senna-docusate (SENOKOT-S;PERICOLACE) 8.6-50 MG per tablet 2-3 tablet     bisacodyl (DULCOLAX) Suppository 10 mg     lactobacillus rhamnosus (GG) (CULTURELL) capsule 1 capsule     Warfarin Therapy Reminder (Check START DATE - warfarin may be starting in the FUTURE)     losartan (COZAAR) tablet 25 mg     benzocaine-menthol (CHLORASEPTIC) 6-10 MG lozenge 1 lozenge     benzocaine (HURRICAINE/TOPEX) 20 % Unit Dose spray     lidocaine 1 % 1 mL     lidocaine (LMX4) kit     sodium chloride (PF) 0.9% PF flush 3 mL     sodium chloride (PF) 0.9% PF flush 3 mL     sodium chloride (PF) 0.9% PF flush 3 mL     ondansetron (ZOFRAN) injection 4 mg    Or     ondansetron (ZOFRAN-ODT) ODT tab 4 mg     hydrALAZINE (APRESOLINE) injection 10 mg     carboxymethylcellulose (REFRESH  PLUS) 0.5 % ophthalmic solution 1 drop     pantoprazole (PROTONIX) EC tablet 40 mg     olopatadine (PATANOL) 0.1 % ophthalmic solution 1 drop     acetaminophen (TYLENOL) tablet 650 mg     allopurinol (ZYLOPRIM) tablet 100 mg     atorvastatin (LIPITOR) tablet 10 mg     bisacodyl (DULCOLAX) EC tablet 5 mg     levETIRAcetam (KEPPRA) tablet 750 mg     loratadine (CLARITIN) tablet 10 mg     melatonin tablet 3 mg     simethicone (MYLICON) chewable tablet 80 mg     thiamine tablet 100 mg     naloxone (NARCAN) injection 0.1-0.4 mg     lidocaine 1 % 1 mL     lidocaine (LMX4) kit     sodium chloride (PF) 0.9% PF flush 3 mL     sodium chloride (PF) 0.9% PF flush 3 mL     Patient is already receiving anticoagulation with heparin, enoxaparin (LOVENOX), warfarin (COUMADIN)  or other anticoagulant medication     metoprolol (TOPROL-XL) 24 hr tablet 50 mg     metoprolol (TOPROL-XL) 24 hr tablet 25 mg     oxyCODONE (ROXICODONE) IR tablet 5 mg

## 2017-05-14 NOTE — PLAN OF CARE
Problem: Goal Outcome Summary  Goal: Goal Outcome Summary  Patient alert and oriented, family at bedside and  here this morning for communication assistance. Pt. Reported mild headache this morning relieved by Tylenol. Pt. With poor appetite, but tolerating broth and juices with no n/v or abdominal pain. Pt. Continues to have urinary retention-straight cath'd once for 650 mL-MD notified and truong ordered and placed. No LVAD alarms this shift. Family at bedside all and assisting with cares. Plan for possible discharge tomorrow.

## 2017-05-14 NOTE — PLAN OF CARE
Problem: Goal Outcome Summary  Goal: Goal Outcome Summary  Outcome: Improving  Pt A&O. VSS on RA. MAPs <90. Heartmate II with no alarms. Denies pain. Scheduled Lidoderm patch placed on right upper back/neck. Incontinent of loose black stool. Voiding per urinal with some difficulty. No straight cath needed this shift. Daughter at bedside, assisting with cares. Plan to discharge in the next few days. Continue with POC and notify MD of any changes.

## 2017-05-15 VITALS
HEIGHT: 68 IN | OXYGEN SATURATION: 99 % | BODY MASS INDEX: 28.83 KG/M2 | WEIGHT: 190.26 LBS | SYSTOLIC BLOOD PRESSURE: 101 MMHG | DIASTOLIC BLOOD PRESSURE: 78 MMHG | HEART RATE: 63 BPM | TEMPERATURE: 98.6 F | RESPIRATION RATE: 16 BRPM

## 2017-05-15 LAB
ALBUMIN SERPL-MCNC: 2.2 G/DL (ref 3.4–5)
ALP SERPL-CCNC: 294 U/L (ref 40–150)
ALT SERPL W P-5'-P-CCNC: 39 U/L (ref 0–70)
AST SERPL W P-5'-P-CCNC: 38 U/L (ref 0–45)
BILIRUB DIRECT SERPL-MCNC: 1.3 MG/DL (ref 0–0.2)
BILIRUB SERPL-MCNC: 1.9 MG/DL (ref 0.2–1.3)
ERCP: NORMAL
HGB BLD-MCNC: 8.4 G/DL (ref 13.3–17.7)
HGB BLD-MCNC: 8.7 G/DL (ref 13.3–17.7)
INR PPP: 2.51 (ref 0.86–1.14)
PROT SERPL-MCNC: 6.5 G/DL (ref 6.8–8.8)

## 2017-05-15 PROCEDURE — 36415 COLL VENOUS BLD VENIPUNCTURE: CPT | Performed by: INTERNAL MEDICINE

## 2017-05-15 PROCEDURE — 25000132 ZZH RX MED GY IP 250 OP 250 PS 637: Performed by: INTERNAL MEDICINE

## 2017-05-15 PROCEDURE — 00000103 ZZHCL STATISTIC CYTO-FISH QC 88120: Performed by: PHYSICIAN ASSISTANT

## 2017-05-15 PROCEDURE — 80076 HEPATIC FUNCTION PANEL: CPT | Performed by: INTERNAL MEDICINE

## 2017-05-15 PROCEDURE — S0138 FINASTERIDE, 5 MG: HCPCS | Performed by: INTERNAL MEDICINE

## 2017-05-15 PROCEDURE — 99239 HOSP IP/OBS DSCHRG MGMT >30: CPT | Performed by: INTERNAL MEDICINE

## 2017-05-15 PROCEDURE — 25000132 ZZH RX MED GY IP 250 OP 250 PS 637: Performed by: PHYSICIAN ASSISTANT

## 2017-05-15 PROCEDURE — 88112 CYTOPATH CELL ENHANCE TECH: CPT | Performed by: PHYSICIAN ASSISTANT

## 2017-05-15 PROCEDURE — 85018 HEMOGLOBIN: CPT | Performed by: INTERNAL MEDICINE

## 2017-05-15 PROCEDURE — 85610 PROTHROMBIN TIME: CPT | Performed by: INTERNAL MEDICINE

## 2017-05-15 RX ORDER — WARFARIN SODIUM 3 MG/1
3 TABLET ORAL
Status: DISCONTINUED | OUTPATIENT
Start: 2017-05-15 | End: 2017-05-15 | Stop reason: HOSPADM

## 2017-05-15 RX ADMIN — ACETAMINOPHEN 650 MG: 325 TABLET, FILM COATED ORAL at 06:03

## 2017-05-15 RX ADMIN — SENNOSIDES AND DOCUSATE SODIUM 1 TABLET: 8.6; 5 TABLET ORAL at 08:46

## 2017-05-15 RX ADMIN — Medication 1 CAPSULE: at 08:44

## 2017-05-15 RX ADMIN — MAGNESIUM OXIDE TAB 400 MG (241.3 MG ELEMENTAL MG) 400 MG: 400 (241.3 MG) TAB at 08:45

## 2017-05-15 RX ADMIN — LORATADINE 10 MG: 10 TABLET ORAL at 08:44

## 2017-05-15 RX ADMIN — CIPROFLOXACIN HYDROCHLORIDE 500 MG: 500 TABLET, FILM COATED ORAL at 00:42

## 2017-05-15 RX ADMIN — ALLOPURINOL 100 MG: 100 TABLET ORAL at 08:44

## 2017-05-15 RX ADMIN — Medication 100 MG: at 08:44

## 2017-05-15 RX ADMIN — LOSARTAN POTASSIUM 25 MG: 25 TABLET, FILM COATED ORAL at 08:44

## 2017-05-15 RX ADMIN — TAMSULOSIN HYDROCHLORIDE 0.4 MG: 0.4 CAPSULE ORAL at 11:50

## 2017-05-15 RX ADMIN — ACETAMINOPHEN 650 MG: 325 TABLET, FILM COATED ORAL at 11:55

## 2017-05-15 RX ADMIN — MELATONIN TAB 3 MG 3 MG: 3 TAB at 00:42

## 2017-05-15 RX ADMIN — METOPROLOL SUCCINATE 50 MG: 50 TABLET, EXTENDED RELEASE ORAL at 08:45

## 2017-05-15 RX ADMIN — LEVETIRACETAM 750 MG: 750 TABLET, FILM COATED ORAL at 08:44

## 2017-05-15 RX ADMIN — OLOPATADINE HYDROCHLORIDE 1 DROP: 1 SOLUTION/ DROPS OPHTHALMIC at 08:44

## 2017-05-15 RX ADMIN — CIPROFLOXACIN HYDROCHLORIDE 500 MG: 500 TABLET, FILM COATED ORAL at 11:50

## 2017-05-15 RX ADMIN — PANTOPRAZOLE SODIUM 40 MG: 40 TABLET, DELAYED RELEASE ORAL at 08:45

## 2017-05-15 RX ADMIN — ATORVASTATIN CALCIUM 10 MG: 10 TABLET, FILM COATED ORAL at 08:44

## 2017-05-15 RX ADMIN — FINASTERIDE 5 MG: 5 TABLET, FILM COATED ORAL at 08:45

## 2017-05-15 NOTE — PLAN OF CARE
Problem: Goal Outcome Summary  Goal: Goal Outcome Summary  Outcome: Improving  Assumed care of pt at 1900. Pt A&O. VSS on RA, Heartmate II with no alarms. Denies pain. Mcarthur patent with adequate UOP. Plan to d/c with dionne tomorrow. Continue with POC and notify MD of any changes.

## 2017-05-15 NOTE — PLAN OF CARE
"Problem: Goal Outcome Summary  Goal: Goal Outcome Summary  Outcome: No Change  /74 (BP Location: Right arm)  Pulse 63  Temp 98.4  F (36.9  C) (Oral)  Resp 16  Ht 1.727 m (5' 8\")  Wt 86.5 kg (190 lb 11.2 oz)  SpO2 98%  BMI 29 kg/m2     Neuro: A&Ox4.   Cardiac: HM 2 with no alarms. SR. VSS.       Respiratory: Sating >95% on RA.  GI/: Adequate urine output per truong. No BM.   Diet/appetite: Tolerating low NA/chol diet. Fair appetite.  Activity:  Assist of 2 with repostioning.  Pain: PRN tylenol given for c/o headache.    Skin: Intact, no new deficits noted.     R: Continue with POC. Notify primary team with changes.             "

## 2017-05-15 NOTE — PROGRESS NOTES
Care Coordinator- Discharge Planning     Admission Date/Time:  5/2/2017  Attending MD:  Gabe Peters MD     Data  Chart reviewed, discussed with interdisciplinary team.   Patient was admitted for:   1. Incomplete bladder emptying    2. Abdominal pain, epigastric    3. Abdominal pain, right upper quadrant    4. Abdominal distention    5. Diarrhea, unspecified type    6. Drug-induced constipation    7. Acute cholecystitis         Assessment  Full assessment completed in previous note    Per MD team, pt is medically stable for discharge today. Notified [facility] of pt's discharge. [facility] stretcher transport arranged for 4pm. PCS form given to [facility].      Plan  Anticipated Discharge Date:  5/15/17  Anticipated Discharge Plan:  Pt will discharge to home with resumption of home care services.     CTS Handoff completed:  YES    Meg Haskins RN

## 2017-05-15 NOTE — PROGRESS NOTES
GASTROENTEROLOGY PROGRESS NOTE    Date of Admission: 5/2/2017  Reason for Admission: abd pain      Assessment:  67 year old male with a history of chronic systolic heart failure s/p LVAD on Destination Therapy who is admitted to the hospital 5/2 for worsening RUQ abdominal pain. Initially, RUQ US showing GB wall thickness of 3.5mm along with duodenitis, surgery consulted did not think was acute cholecystitis. Subsequently had increasing leukocytosis and inflammatory markers prompting repeat imaging which showed resolved duodenitis but repeat RUQ US showing 7mm thicken GB wall.     Liver tests rising therefore patient under went ERCP with transpapillary cystic duct stenting to drain GB on 5/9. There was gross hemobilia - blood draining from bile duct and GB. No sphincterotomy done, 7F x 12cm DPT GB placed.     New changes:  Black stools for a couple of days, hgb drop but this is somewhat expected given gallbladder full of blood during ERCP 5/9. The bleeding does appear to be slowing down given more formation of stools.       Recommendations:  OK for regular diet  OK to continue AC  Continue bowel regimen  Pain meds, antiemetics per primary team  Discussed with primary team, Dr. Clay    The inpatient gastroenterology service will sign off at this time. Please call or repost with questions or if status changes. Thank you for allowing us to participate in the care of this patient.     Pt seen and care plan discussed with Dr. Grove GI staff physician.     Violeta Fleming PA-C  Advanced Endoscopy/Pancreaticobiliary [facility]  [facility]  Pager *[phone]  _______________________________________________________________      Subjective: Patient seen and examined at 1000. Daughter at bedside helping with cares. Had soft, black stool this AM. Had more liquid black stool yesterday. Hgb dropped slightly to 8.4 today (recheck 8.7). No nausea or vomiting. Abd pain improved, almost resolved. NG tube  "removed over weekend.    ROS:   4 pt ROS negative unless noted in subjective.     Objective:  Blood pressure 101/78, pulse 63, temperature 98.6  F (37  C), temperature source Oral, resp. rate 16, height 1.727 m (5' 8\"), weight 86.3 kg (190 lb 4.1 oz), SpO2 99 %.  Gen: Mild distress  HEENT: anicteric  Resp: Clear bilaterally anteriorly  CV: RRR, LVAD audible  Abd: NT, ND, BS+, no peritoneal signs        PROCEDURES:  ERCP 5/9 - Dr. Grove  Extremely difficult UGI anatomy requring endviewer and 140 sidewiewer over wire. Gross hemobilia, draining from papilla. Blood filled bile duct and gallbladder. Baloon dilated cystic duct and placed 7F 12xm double pigtail into gallbladder.     LABS:  BMP    Recent Labs  Lab 05/14/17  0730 05/13/17  1052 05/13/17  0708 05/12/17  1539 05/12/17  0618  05/11/17  0546     --  138  --  141  --  143   POTASSIUM 3.6 3.7 5.4* 3.6 3.2*  < > 3.2*   CHLORIDE 108  --  108  --  109  --  115*   JOSÉ 8.8  --  8.9  --  8.6  --  8.2*   CO2 22  --  21  --  19*  --  19*   BUN 19  --  21  --  20  --  16   CR 1.68*  --  1.68*  --  1.75*  --  1.57*   *  --  106*  --  111*  --  117*   < > = values in this interval not displayed.  CBC    Recent Labs  Lab 05/15/17  1236  05/14/17  0730 05/13/17  0708 05/12/17  0618 05/11/17  0546   WBC  --   --  9.5 11.0 11.8* 11.0   RBC  --   --  3.12* 3.18* 3.29* 3.28*   HGB 8.7*  < > 9.5* 9.6* 10.0* 10.1*   HCT  --   --  29.6* 30.2* 30.9* 31.2*   MCV  --   --  95 95 94 95   MCH  --   --  30.4 30.2 30.4 30.8   MCHC  --   --  32.1 31.8 32.4 32.4   RDW  --   --  18.0* 17.9* 17.3* 17.0*   PLT  --   --  220 219 255 230   < > = values in this interval not displayed.  INR    Recent Labs  Lab 05/15/17  0629 05/14/17  0730 05/13/17  0708 05/12/17  0618   INR 2.51* 2.68* 2.25* 1.60*     LFTs    Recent Labs  Lab 05/15/17  0629 05/14/17  0730 05/13/17  0708 05/12/17  0618   ALKPHOS 294* 391* 374* 347*   AST 38 59* Unsatisfactory specimen - hemolyzed Unsatisfactory " specimen - hemolyzedMI ROGELIO AT 0820 05/13/17 BY FI 95*   ALT 39 54 66 77*   BILITOTAL 1.9* 2.7* 7.0* 6.4*   PROTTOTAL 6.5* 6.8 6.7* 6.3*   ALBUMIN 2.2* 2.5* 2.2* 2.3*      PANC    Recent Labs  Lab 05/10/17  0417   LIPASE 98         IMAGING:  EXAMINATION: US ABDOMEN LIMITED, 5/7/2017 11:58 AM           IMPRESSION:   Increased gallbladder wall thickening of 7.4 mm, previously 3.5, and  new pericholecystic fluid, with redemonstrated gallbladder sludge.  Differential remains acalculus cholecystitis versus changes secondary  to hepatic dysfunction.         EXAMINATION: CT CHEST ABDOMEN PELVIS W/O CONTRAST 5/6/2017 4:11 PM       IMPRESSION:  1. Although limited by lack of intravenous contrast, no definitive CT  evidence of infection associated with the left ventricular assist  device.  2. No significant change in opacities of the lower lobes, right  greater the left, which may represent atelectasis with possible  superimposed pneumonia.  3. Persistent thickening of the rectum, which may be due to stool  burden and or proctitis.   5. Moderate cardiomegaly. Mild dilatation of the main pulmonary  artery, measuring up to 3.4 cm in diameter. The latter finding may  represent some degree of underlying pulmonary arterial hypertension.  6. Near-complete resolution of previously demonstrated fat stranding  adjacent to the second portion of the duodenum.  7. Significant distention of the gallbladder, which may be due to  fasting state. No radiopaque gallstones noted. No adjacent fat  stranding or CT findings to suggest acute cholecystitis.  8. Hypoattenuating right thyroid nodule, which occupies the majority  of the gland. 9. Groundglass pulmonary nodule within the right middle  lobe, which may be infectious. No significant change from prior study  5/3/2017.

## 2017-05-15 NOTE — PROGRESS NOTES
DISCHARGE                         5/15/2017  4:16 PM  ----------------------------------------------------------------------------  Discharged to: Home  Via: [employer] stretcher  Accompanied by: Family, EMS  Discharge Instructions: diet, activity, medications, follow up appointments, when to call the MD, aftercare instructions.  Declined  for discharge and truong teaching.  Caring for urinary catheter booklet sent with patient and daughter  Prescriptions: filled by discharge pharmacy; medication list reviewed & sent with pt, medications picked up at discharge pharmacy by patient's daughter  Follow Up Appointments: arranged; information given  Belongings: All sent with pt  IV: d/c'd  Telemetry: d/c'd  Pt and daughter exhibits understanding of above discharge instructions; all questions answered.  Discharge information faxed to home health agency    Discharge Paperwork: Signed, copied, and sent home with patient.

## 2017-05-16 ENCOUNTER — CARE COORDINATION (OUTPATIENT)
Dept: GERIATRIC MEDICINE | Facility: CLINIC | Age: 67
End: 2017-05-16

## 2017-05-16 ENCOUNTER — ANTICOAGULATION THERAPY VISIT (OUTPATIENT)
Dept: ANTICOAGULATION | Facility: CLINIC | Age: 67
End: 2017-05-16

## 2017-05-16 ENCOUNTER — CARE COORDINATION (OUTPATIENT)
Dept: CARDIOLOGY | Facility: CLINIC | Age: 67
End: 2017-05-16

## 2017-05-16 DIAGNOSIS — Z95.811 LVAD (LEFT VENTRICULAR ASSIST DEVICE) PRESENT (H): ICD-10-CM

## 2017-05-16 DIAGNOSIS — Z79.01 LONG-TERM (CURRENT) USE OF ANTICOAGULANTS: ICD-10-CM

## 2017-05-16 LAB — INR PPP: 2.1

## 2017-05-16 NOTE — MR AVS SNAPSHOT
Sohan [facility]   5/16/2017   Anticoagulation Therapy Visit    Description:  67 year old male   Provider:  Dafne Sanders, RN   [facility]           INR as of 5/16/2017     Today's INR No new INR was available at the time of this encounter.      Anticoagulation Summary as of 5/16/2017     INR goal    Prior goal 1.8-2.5   Today's INR No new INR was available at the time of this encounter.   Full instructions 4.5 mg on Sun, Tue, Thu; 3 mg all other days   Next INR check     Indications   LVAD (left ventricular assist device) present [Z95.811]  Long-term (current) use of anticoagulants [Z79.01] [Z79.01]         May 2017 Details    Sun Mon Tue Wed Thu Fri Sat      1               2               3               4               5               6                 7               8               9               10               11               12               13                 14               15               16      4.5 mg   See details      17      3 mg         18      4.5 mg         19      3 mg         20      3 mg           21      4.5 mg         22      3 mg         23      4.5 mg         24      3 mg         25      4.5 mg         26      3 mg         27      3 mg           28      4.5 mg         29      3 mg         30      4.5 mg         31      3 mg             Date Details   05/16 This INR check      Date of next INR: No date specified         How to take your warfarin dose     To take:  3 mg Take 1 of the 3 mg tablets.    To take:  4.5 mg Take 1.5 of the 3 mg tablets.

## 2017-05-16 NOTE — PROGRESS NOTES
Received notice that client discharged back to home yesterday from the hospital. Called client and was able to speak with daughter Almaz Rendon who states that client is doing well. Client got all meds and has a follow up appt with PCP this Thursday. Client has a follow up appt with urology in June and daughter wants client to be seen sooner to get the catheter out than waiting 1-2 months as recommended at discharge. Recommended to discuss with PCP on Thursday. Discharge orders also said to weigh client daily but daughter reports client is bed bound and cannot stand on scale so not able to weigh. No requests or other needs at this time. Called [facility] to notify them that client discharged back to home yesterday and they were aware. Transitions log completed.   Yisel Cunha RN, PHN, [facility]   [phone]

## 2017-05-16 NOTE — PROGRESS NOTES
Pt's daughter states that the Pt is feeling well. They stated that his stool is dark and that the home care RN was on her way to check a Hgb. Pt has an appt with his PCP this week. They did not have any other questions or concerns.

## 2017-05-16 NOTE — MR AVS SNAPSHOT
Sohan [facility]   5/16/2017   Anticoagulation Therapy Visit    Description:  67 year old male   Provider:  Sarah Martinez, RN   Department:  [facility]           INR as of 5/16/2017     Today's INR 2.10      Anticoagulation Summary as of 5/16/2017     INR goal    Prior goal 1.8-2.5   Today's INR 2.10   Full instructions 5/16: 3 mg; Otherwise 4.5 mg on Sun, Tue, Thu; 3 mg all other days   Next INR check 5/19/2017    Indications   LVAD (left ventricular assist device) present [Z95.811]  Long-term (current) use of anticoagulants [Z79.01] [Z79.01]         May 2017 Details    Sun Mon Tue Wed Thu Fri Sat      1               2               3               4               5               6                 7               8               9               10               11               12               13                 14               15               16      3 mg   See details      17      3 mg         18      4.5 mg         19            20                 21               22               23               24               25               26               27                 28               29               30               31                   Date Details   05/16 This INR check       Date of next INR:  5/19/2017         How to take your warfarin dose     To take:  3 mg Take 1 of the 3 mg tablets.    To take:  4.5 mg Take 1.5 of the 3 mg tablets.

## 2017-05-16 NOTE — PROGRESS NOTES
Dates of hospitalization: 5/3 to 5/15  Reason for hospitalization: Gallbladder blockage and infection  Procedures performed: stenting of gallbladder ducts, and antibiotic therapy  Vitamin K or FFP administered? no  Inpatient warfarin doses added to calendar? yes  Medication changes at discharge: Started Cipro, proscar, lactobascillus rhamnosus, Mag Ox .  D/C Ferrous sulfate, and zofran.  Warfarin dosing after DC: 3mg MTh, and 4.5mg ROW.    Patient discharged on Lovenox? no  Next INR date: 5/17  Where is the patient discharging to? (home, TCU, staying locally, etc.): home  Will patient have home care? HC

## 2017-05-16 NOTE — PROGRESS NOTES
ANTICOAGULATION FOLLOW-UP CLINIC VISIT    Patient Name:  Sohan Rendon  Date:  5/16/2017  Contact Type:  Telephone    SUBJECTIVE:     Patient Findings     Positives Antibiotic use or infection    Comments Pt started 8 days of Cipro. Recently D/C'd from hospital            OBJECTIVE    INR   Date Value Ref Range Status   05/16/2017 2.10  Final     Comment:     Home Care      Chromogenic Factor 10   Date Value Ref Range Status   08/10/2014 24 (L) 70 - 130 % Final     Comment:     Therapeutic Range:  A Chromogenic Factor 10 level of approximately 20-40%   inversely correlates with an INR of 2-3 for patients receiving Warfarin.   Chromogenic Factor 10 levels below 20% indicate an INR greater than 3 and   levels above 40% indicate an INR less than 2.       Factor 2 Assay   Date Value Ref Range Status   07/21/2014 25 (L) 60 - 140 % Final     Comment:     Analyte Specific Reagents (ASRs) are used in many laboratory tests necessary   for   standard medical care and generally do not require FDA approval.  This test   was   developed and its performance characteristics determined by [facility].  It has not been cleared or approved by   the US Food and Drug Administration.         ASSESSMENT / PLAN  INR assessment THER    Recheck INR In: 3 DAYS    INR Location Homecare INR      Anticoagulation Summary as of 5/16/2017     INR goal    Prior goal 1.8-2.5   Today's INR 2.10   Maintenance plan 4.5 mg (3 mg x 1.5) on Sun, Tue, Thu; 3 mg (3 mg x 1) all other days   Full instructions 5/16: 3 mg; Otherwise 4.5 mg on Sun, Tue, Thu; 3 mg all other days   Weekly total 25.5 mg   Plan last modified Blanca Bah RN (2/27/2017)   Next INR check 5/19/2017   Priority INR   Target end date Indefinite    Indications   LVAD (left ventricular assist device) present [Z95.811]  Long-term (current) use of anticoagulants [Z79.01] [Z79.01]         Anticoagulation Episode Summary     INR check  location     Preferred lab     Send INR reminders to [facility]    Comments Goal Range is 1.8-2.3  [facility] comes out to draw INR  Daughter Almaz Ph [phone]      Anticoagulation Care Providers     Provider Role Specialty Phone number    Evon Lemons MD Responsible Cardiology [phone]            See the Encounter Report to view Anticoagulation Flowsheet and Dosing Calendar (Go to Encounters tab in chart review, and find the Anticoagulation Therapy Visit)    Spoke with JONNIE Cowart. Gave them new warfarin recommendation.  No changes in health, medication, or diet. No missed doses, no falls. No signs or symptoms of bleed or clotting.     Sarah Martinez RN

## 2017-05-17 ENCOUNTER — TELEPHONE (OUTPATIENT)
Dept: FAMILY MEDICINE | Facility: CLINIC | Age: 67
End: 2017-05-17

## 2017-05-17 NOTE — TELEPHONE ENCOUNTER
Sofya called with [facility] regarding pt. She is requesting resumption of homecare orders, Skilled nursing 2w1, 1w4, 3PRN.     Sofya can be reached with questions.

## 2017-05-17 NOTE — TELEPHONE ENCOUNTER
Left voicemail for SHAWN Cowart [facility] giving verbal ok on SN orders requested per RN protocol.    Concetta Burr RN

## 2017-05-18 ENCOUNTER — CARE COORDINATION (OUTPATIENT)
Dept: CARE COORDINATION | Facility: CLINIC | Age: 67
End: 2017-05-18

## 2017-05-18 ENCOUNTER — OFFICE VISIT (OUTPATIENT)
Dept: FAMILY MEDICINE | Facility: CLINIC | Age: 67
End: 2017-05-18
Payer: COMMERCIAL

## 2017-05-18 VITALS
OXYGEN SATURATION: 99 % | RESPIRATION RATE: 14 BRPM | SYSTOLIC BLOOD PRESSURE: 90 MMHG | HEART RATE: 64 BPM | DIASTOLIC BLOOD PRESSURE: 66 MMHG

## 2017-05-18 DIAGNOSIS — D62 ACUTE POSTHEMORRHAGIC ANEMIA: ICD-10-CM

## 2017-05-18 DIAGNOSIS — K83.1 BILIARY TRACT OBSTRUCTION (H): ICD-10-CM

## 2017-05-18 DIAGNOSIS — N40.1 BENIGN NON-NODULAR PROSTATIC HYPERPLASIA WITH LOWER URINARY TRACT SYMPTOMS: ICD-10-CM

## 2017-05-18 DIAGNOSIS — K81.9 ACALCULOUS CHOLECYSTITIS: Primary | ICD-10-CM

## 2017-05-18 DIAGNOSIS — R33.9 URINARY RETENTION: ICD-10-CM

## 2017-05-18 PROCEDURE — 99349 HOME/RES VST EST MOD MDM 40: CPT

## 2017-05-18 NOTE — PROGRESS NOTES
Patient has clinic visit today 5/18 so no post DC follow up call is needed          May 18, 2017 8:30 AM CDT   Return Visit with Thomas Dill MD   [facility]     [address]  [address]  [facility] [address]   [phone]

## 2017-05-18 NOTE — Clinical Note
"I ordered \"today\" labs on this patient.  He has a  home care RN who can probably draw them.  Please keep the 6/13/17 follow-up."

## 2017-05-18 NOTE — MR AVS SNAPSHOT
After Visit Summary   5/18/2017    Sohan Rendon    MRN: [id number]           Patient Information     Date Of Birth          1950        Visit Information        Provider Department      5/18/2017 8:30 AM Thomas Dill MD; [employer] [facility]        Today's Diagnoses     Acalculous cholecystitis    -  1    Biliary tract obstruction        Urinary retention        Benign non-nodular prostatic hyperplasia with lower urinary tract symptoms        Acute posthemorrhagic anemia          Care Instructions    1.  I have ordered labs to be drawn today, as we discussed.  I'll let you know the results as soon as I get them.    2.  I hope you're successful at getting your Mcarthur catheter out tomorrow.  I'll check the chart tomorrow to see how it went.    3.  I'm scheduled to see you next on Tuesday, June 13th at 2 PM.        Follow-ups after your visit        Your next 10 appointments already scheduled     May 19, 2017 10:15 AM CDT   Return Visit with  Prostate Cancer Ctr Nurse   [facility] ([facility])    [address]  [address] [facility] [address]   [phone]            Jun 13, 2017  2:00 PM CDT   Return Visit with Thomas Dill MD   [facility]    [address]  [address] [place] [address]   [phone]            Jun 22, 2017  4:20 PM CDT   (Arrive by 4:05 PM)   Return Visit with Sebastian Meier MD   [facility] ([facility])    [address]  4th Floor  [facility] [address]   [phone]            Jul 18, 2017  2:00 PM CDT   Return Visit with Thomas Dill MD   [facility]    [address]  [address]  [facility] [address]   [phone]            Aug  15, 2017  2:00 PM CDT   [facility]    [address]  [address]  [facility] [address]   [phone]            Sep 12, 2017  2:00 PM CDT   [facility]   [facility]    [address]  [address]  [facility] [address]   [phone]            Oct 10, 2017  2:00 PM CDT   [facility]    [address]  [address]  [facility] [address]   [phone]              Future tests that were ordered for you today     Open Future Orders        Priority Expected Expires Ordered    CBC with platelets Routine 5/18/2017 5/20/2017 5/18/2017    Comprehensive metabolic panel (BMP + Alb, Alk Phos, ALT, AST, Total. Bili, TP) Routine 5/18/2017 5/20/2017 5/18/2017    Troponin I Routine 5/18/2017 5/20/2017 5/18/2017            Who to contact     If you have questions or need follow up information about today's clinic visit or your schedule please contact [facility] directly at [phone].  Normal or non-critical lab and imaging results will be communicated to you by Peter Blueberryhart, letter or phone within 4 business days after the clinic has received the results. If you do not hear from us within 7 days, please contact the clinic through MyChart or phone. If you have a critical or abnormal lab result, we will notify you by phone as soon as possible.  Submit refill requests through [employer] or call your pharmacy and they will forward the refill request to us. Please allow 3 business days for your refill to be completed.          Additional Information About Your Visit        [employer] Information     [employer] lets you send messages to your doctor, view your test results, renew your prescriptions, schedule appointments and more. To sign up, go to  [email or web address] . Click on \"Log in\" on the left side of the screen, which will take you to the Welcome page. Then click on \"Sign up Now\" on the right side of the page.     You will be asked to enter the access code listed below, as well as some personal information. Please follow the directions to create your username and password.     Your access code is: [email or web address]  Expires: 2017 12:00 PM     Your access code will  in 90 days. If you need help or a new code, please call your [place] clinic or [phone].        Care EveryWhere ID     This is your Care EveryWhere ID. This could be used by other organizations to access your [place] medical records  [id number]        Your Vitals Were     Pulse Respirations Pulse Oximetry             64 14 99%          Blood Pressure from Last 3 Encounters:   17 90/66   05/15/17 101/78   17 (!) 154/111    Weight from Last 3 Encounters:   05/15/17 190 lb 4.1 oz (86.3 kg)   17 180 lb (81.6 kg)   17 180 lb (81.6 kg)                 Today's Medication Changes          These changes are accurate as of: 17 12:01 PM.  If you have any questions, ask your nurse or doctor.               These medicines have changed or have updated prescriptions.        Dose/Directions    acetaminophen 325 MG tablet   Commonly known as:  TYLENOL   This may have changed:    - when to take this  - reasons to take this   Used for:  Femoral neck fracture, right, closed, initial encounter        Dose:  650 mg   Take 2 tablets (650 mg) by mouth every 6 hours   Quantity:  100 tablet   Refills:  0       azelastine 0.05 % Soln ophthalmic solution   Commonly known as:  OPTIVAR   This may have changed:    - when to take this  - reasons to take this        Dose:  1 drop   Apply 1 drop to eye 2 times daily   Quantity:  1 Bottle   Refills:  11                Primary Care Provider Office Phone # Fax #    Thomas Dill [phone]       [facility] [address] [place] [address] [address]     [facility] [address]        Thank you!     Thank you for choosing [facility]  for your care. Our goal is always to provide you with excellent care. Hearing back from our patients is one way we can continue to improve our services. Please take a few minutes to complete the written survey that you may receive in the mail after your visit with us. Thank you!             Your Updated Medication List - Protect others around you: Learn how to safely use, store and throw away your medicines at www.disposemymeds.org.          This list is accurate as of: 5/18/17 12:01 PM.  Always use your most recent med list.                   Brand Name Dispense Instructions for use    acetaminophen 325 MG tablet    TYLENOL    100 tablet    Take 2 tablets (650 mg) by mouth every 6 hours       allopurinol 100 MG tablet    ZYLOPRIM    90 tablet    Take 1 tablet (100 mg) by mouth daily       ascorbic acid 500 MG tablet    VITAMIN C    90 tablet    Take 1 tablet (500 mg) by mouth daily With iron pill       atorvastatin 10 MG tablet    LIPITOR    90 tablet    TAKE 1 TABLET (10 MG) BY MOUTH DAILY       azelastine 0.05 % Soln ophthalmic solution    OPTIVAR    1 Bottle    Apply 1 drop to eye 2 times daily       * bisacodyl 5 MG EC tablet    DULCOLAX    20 tablet    Take 1 tablet (5 mg) by mouth daily as needed for constipation       * bisacodyl 10 MG Suppository    DULCOLAX    5 suppository    Place 1 suppository (10 mg) rectally daily as needed for constipation       blood glucose monitoring lancets     1 Box    Use to test blood sugars 2 times daily or as directed.       blood glucose monitoring test strip    no brand specified    1 Box    Use to test blood sugar 2 times daily or as directed.       calcium carbonate-vitamin D 500-400 MG-UNIT Tabs per tablet     90 tablet    Take 1 tablet by mouth daily       carboxymethylcellulose 0.5 % Soln ophthalmic solution    REFRESH PLUS    30 mL     Place 1 drop into the right eye 3 times daily as needed for other (eye irritation or discomfort.)       ciprofloxacin 500 MG tablet    CIPRO    16 tablet    Take 1 tablet (500 mg) by mouth every 12 hours for 8 days       * finasteride 5 MG tablet    PROSCAR    90 tablet    Take 1 tablet (5 mg) by mouth daily       * finasteride 5 MG tablet    PROSCAR    30 tablet    Take 1 tablet (5 mg) by mouth daily       guaiFENesin-dextromethorphan 100-10 MG/5ML syrup    ROBITUSSIN DM    560 mL    Take 5 mLs by mouth every 4 hours as needed for cough       lactobacillus rhamnosus (GG) capsule     22 capsule    Take 1 capsule by mouth 2 times daily for 11 days       levETIRAcetam 750 MG tablet    KEPPRA    180 tablet    Take 1 tablet (750 mg) by mouth 2 times daily       loratadine 10 MG tablet    CLARITIN    90 tablet    Take 1 tablet (10 mg) by mouth daily       losartan 25 MG tablet    COZAAR    45 tablet    Take 1 tablet (25 mg) by mouth daily       magnesium oxide 400 MG tablet    MAG-OX    14 tablet    Take 1 tablet (400 mg) by mouth 2 times daily       melatonin 3 MG tablet      Take 1 tablet (3 mg) by mouth At Bedtime       * metoprolol 25 MG 24 hr tablet    TOPROL-XL    90 tablet    TAKE 1 TABLET (25 MG) BY MOUTH EVERY EVENING       * metoprolol 50 MG 24 hr tablet    TOPROL-XL    90 tablet    Take 1 tablet (50 mg) by mouth daily in the morning.       nitroglycerin 0.4 MG sublingual tablet    NITROSTAT    30 tablet    Place 1 tablet (0.4 mg) under the tongue every 5 minutes as needed for chest pain       nystatin 221092 UNIT/GM Powd    MYCOSTATIN    60 g    Apply to affected areas of skin in the groin and on the scrotum three times a day as needed.       olopatadine 0.1 % ophthalmic solution    PATANOL    1 Bottle    Place 1 drop into both eyes 2 times daily       * order for DME     1 Device    Equipment being ordered: Lift chair.  Please see indications and face-to-face encounter details in 2/3/15 Office Visit note.        * order for DME     2 each    Equipment being ordered: Bilateral leg supports for manual wheelchair.       * order for DME     1 each    Equipment being ordered: Reclining manual w/c with bilateral elevating leg rests.       * order for DME     1 each    Equipment being ordered: Hospital Bed, fully electric.       * order for DME     1 each    Equipment being ordered: Wheelchair, manual.       * order for DME     1 each    Equipment being ordered: Wheelchair, manual, with elevated leg rests and tilt in space back.  Please fax to [facility] [place]; I called them 11/26/16 and they requested the new order.  Face to face is in my 10/26/16 note.       * order for DME     2 each    Equipment being ordered: Cushioned heel boots.       oxyCODONE 5 MG IR tablet    ROXICODONE    30 tablet    Take 1 tablet (5 mg) by mouth every 4 hours as needed for moderate to severe pain       pantoprazole 40 MG EC tablet    PROTONIX    180 tablet    Take 1 tablet (40 mg) by mouth daily       senna-docusate 8.6-50 MG per tablet    SENOKOT-S;PERICOLACE    60 tablet    Take 1-2 tablets by mouth 2 times daily as needed for constipation       simethicone 80 MG chewable tablet    MYLICON    180 tablet    Take 1 tablet (80 mg) by mouth 4 times daily       tamsulosin 0.4 MG capsule    FLOMAX    90 capsule    Take 1 capsule (0.4 mg) by mouth daily       thiamine 100 MG tablet     90 tablet    TAKE 1 TABLET (100 MG) BY MOUTH DAILY       warfarin 3 MG tablet    COUMADIN    180 tablet    Take 3mg by mouth on Mondays and Thursdays. Take 4.5mg by mouth all other days of the week.       * Notice:  This list has 13 medication(s) that are the same as other medications prescribed for you. Read the directions carefully, and ask your doctor or other care provider to review them with you.

## 2017-05-18 NOTE — PATIENT INSTRUCTIONS
1.  I have ordered labs to be drawn today, as we discussed.  I'll let you know the results as soon as I get them.    2.  I hope you're successful at getting your Mcarthur catheter out tomorrow.  I'll check the chart tomorrow to see how it went.    3.  I'm scheduled to see you next on Tuesday, June 13th at 2 PM.

## 2017-05-18 NOTE — PROGRESS NOTES
SUBJECTIVE:                                                    Sohan Rendon is a 67 year old male who has an LVAD due to severe ischemic cardiomyopathy. He has had recurrent embolic strokes, despite careful anticoagulation, with the LVAD believed to be the source. He was hospitalized 9/23 - 10/4/14 for acute ischemic subcortical stroke, superimposed upon previous R PICA and R MCA strokes. During that hospital stay, several discussions were carried out with the patient's family regarding the possibility of LVAD removal and conversion to Hospice. Per my discussion with his attending cardiologist, Dr. Lemons, family are well aware of the patient's terminal condition, but they steadfastly decline enrolling him into Hospice. They have wished to continue LVAD and other cares at home, while ensuring his comfort. Dr. Lemons made a referral for this patient to be seen at home by [facility] to provide primary care management, though the LVAD/Cardiology clinics will remain in charge of his cardiology care.       Mr. Rendon was seen today in his home along with a daughter and an  for the above issues, in follow-up of a recent hospital stay:      Hospital Follow-up Visit:    Hospital/Nursing Home/IP Rehab Facility: [facility]  Date of Admission: 5/2/17  Date of Discharge: 5/15/17  Reason(s) for Admission: 1. Acute acalculous cholecystitis.  2. Biliary obstruction.  3. Recurrent gross hematuria.  4. Acute on chronic anemia.            Problems taking medications regularly:  None       Medication changes since discharge: None       Problems adhering to non-medication therapy:  None    Summary of hospitalization:  [facility] discharge summary reviewed, portions follow:    Hospital Course:   # Acute Acalculous Cholecystitis: Presented with right-sided abdominal pain. Abdominal US on admission (5/3) showed gallbladder sludge with non-specific mild gallbladder wall thickening w/o  cholelithiasis. Evaluated by General Surgery (5/3) who did not feel exam, imaging, or history were initially consistent with cholecystitis. Rising CRP, Procalcitonin, and WBC prompted repeat RUQ US (5/7) which showed gallbladder wall had doubled in size (from 3.5 mm to 7.4 mm) with new pericholecystic fluid and demonstrated gallbladder sludge consistent with acalculous cholecystitis. Surgery did not feel he was a good surgical candidate. He underwent ERCP/transpapillary stent placement on 5/9 with findings of gross hemobilia s/p balloon dilation of cystic duct and gallbladder stenting. Received IV Zosyn 5/7-5/11. Converted to oral cipro on 5/11. Will complete 14 day course from time of source control (5/23). Remainder of infectious work-up negative to date including Blood Cultures X 4, Sputum Cx, Urine Culture, C.diff PCR, and Enteric XOCHILT.      # Persistent biliary obstruction: LFTs wnl on admission (5/2) with acute increase on 5/7 and persistent T.Bili elevation post-ERCP. GI feels TBili elevation is due to transient obstruction from clotting blood in setting of hemobilia drainage. Trended down and were 1.9 at the time of discharge.      # Recurrent mild ileus vs Severe Constipation: Developed abdominal distention, constipation, some nausea on 5/8. AXR (5/8) showed non-obstructive bowel pattern. Significantly improved 5/9 after passing large amount of flatus and having BM. Recurred overnight from 5/9-5/10. AXR (5/10) initially read as ileus vs developing SBO, thus NG was placed, but was later read as non-obstructive bowel gas pattern. Symptoms significantly improved s/p NG placement and with addition of magnesium supplementation. NG was clamped on 5/12 and removed 5/13. Diet was advanced to regular. He was continued on an aggressive bowel regimen.      Duodenitis: Admission CT Chest/Abd/Pelvis (5/5) with inflammatory fat stranding around second portion of duodenum with extension along the right anterior pararenal  fascia c/w duodenitis; no localized fluid collections or free air to suggest perforation. Repeat CT (5/6) showed near complete resolution. H. Pylori stool antigen, C.diff PCR, and Enteric XOCHILT negative on 5/7. Continue Protonix 40 mg QD.       # Concern for GI bleed: Blackish stool on 5/12. Hemoglobin remained stable around 10. GI felt this was likely is passing old blood after procedure. Discussed on day of admission. Went to 8.4 then back up to 8.7.      # Chronic Anticoagulation secondary to LVAD: INR goal 1.8-2.5 in setting of recurrent hematuria. Coumadin intermittently held due to supratherapeutic INR and ERCP, most recently resumed 5/10. Heparin gtt bridging while Coumadin on hold/INR subtherapeutic. Received 2 mg IV Vitamin K on 5/7 and 1 u FFP on 5/9 for INR reversal prior to ERCP. Heparin discontinued once INR in goal range.       # Chronic Systolic Heart Failure secondary to Ischemic Cardiomyopathy s/p ICD and HM II LVAD (10/9/2012): As destination therapy (DT). Stage D, NYHA Class III. 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 Admission weight 81.6 kg. Most recent weight 90.5 kg on 5/12 (90.8 kg on 5/11). Appears mildly hypervolemic on exam (BLE edema, elevated MAPs). Receive 1 time dose of Lasix 40 mg IV on 5/11. Not on any outpatient diuretics.  - LVAD settings: Flows have not been calculated, PI ~6, Speed 8800, Power ~5.5. No alarms over past 24 hours.  - Continue Losartan and Toprol-XL. Losartan dosing increased to 25 mg BID (from home dose of 25 mg QD) on 5/10 due to elevated MAPs.  - Not on ASA due to recurrent hematuria/hemolysis  - Not on aldosterone antagonist due to renal dysfunction  - Continue home Atorvastatin      # Recurrent intermittent  Gross Hematuria - Since June 2016. Previously attributed to UTI, BPH, and neurogenic bladder in setting of anticoagulation. INR goal decreased to 1.8-2.5 and ASA held as a result. Developed recurrent hematuria overnight from 5/9-5/10. Hgb stable in 10s since developement. Evaluated by Urology 5/10 who noted it is expected that intermittent gross hematuria will persist while on anti-coagulation.  - Continue Flomax per PTA dosing  - Started Finasteride 5 mg QD per Urology recommendations  - Follow up on urine cytology/FISH (ordered by Urology for recurrent hematuria)   - Outpatient follow up with Dr. Meier for evaluation of recurrent hematuria and discuss need for repeat cystoscopy       # HTN - MAP goal 65-85. MAPs persistently elevated earlier this week for which Losartan was increased to 25 mg BID on 5/10 (home dose is 25 mg QD) and 1 time dose of Lasix IV 40 mg given 5/11. MAPs gradually improved Losartan dosing increased to 25 mg BID (from home dose of 25 mg QD) due to elevated MAPs. Continued Toprol-XL 50 mg q AM/25 mg q PM.       # Acute on Chronic Normocytic Anemia: Baseline Hgb ~13-14 secondary to iron deficiency and anemia of chronic disease. Hgb 13.2 on admission. Hgb decreased to ~10-11 this admission, was 8.7 on day of discharge.Again, GI notes melena would not be unexpected given findings of hemobilia on ERCP with gallbladder stenting.   - Resume home iron supplements once infection resolves.       # Urinary Retention in setting of BPH: S/p TURP in 2014. Developed urinary retention this hospitalization s/p Truong placement on 5/5. Truong removed 5/12. However, continued to need intermittent straight cath for retention so trunog was replaced. Discharging on flomax and new prescription for proscar 5 mg daily per urology. Will need urology follow up 1 week after discharge to assess for removal.      # NAGMA - Since 5/3. Likely secondary to CKD. Clinical picture not c/w RTA. No significant GI losses. Resolved.  "      # Hypokalemia: Likely due to NPO status and Lasix use. Resolved.       Chronic Stable Medical Conditions and Resolved Hospital Issues     Multiple CVAs - Residual left-sided hemiparesis. Lift dependent at baseline.   Seizure Disorder - No recent seizures. Continue Keppra.  CKD - Baseline Cr ~1.6-1.8. Cr remained stable at baseline  Gout - Stable without evidence of flare. Continue Allopurinol.  Seasonal Allergies - Stable. Continue Claritin       Diagnostic Tests/Treatments reviewed.  Follow up needed: Need to recheck Hgb.  Other Healthcare Providers Involved in Patient s Care:         Has Urology appointment 5/19/17 to assess for possible Mcarthur removal, and GI follow-up date TBA.  Update since discharge: Daughter reports that patient continues to have melena with each BM, no hematochezia.  No nausea or emesis, appetite OK.  Abdomen feels \"bloated\" but not painful.  Feels like someone is \"pinching\" him either in LUQ or left chest.     Post Discharge Medication Reconciliation: discharge medications reconciled and changed, per note/orders (see AVS).  Plan of care communicated with patient and two daughters.     Coding guidelines for this visit:  Type of Medical   Decision Making Face-to-Face Visit       within 7 Days of discharge Face-to-Face Visit        within 14 days of discharge   Moderate Complexity [id number]   High Complexity [id number]            Problem list and histories reviewed & adjusted, as indicated.  Additional history: as documented    BP Readings from Last 3 Encounters:   05/18/17 90/66   05/15/17 101/78   05/02/17 (!) 154/111    Wt Readings from Last 3 Encounters:   05/15/17 190 lb 4.1 oz (86.3 kg)   05/02/17 180 lb (81.6 kg)   04/29/17 180 lb (81.6 kg)                  Labs reviewed in EPIC    Reviewed and updated as needed this visit by clinical staff       Reviewed and updated as needed this visit by Provider       ROS:  Obtained both from patient's daughters, as well as the patient " himself via .   CONSTITUTIONAL: NEGATIVE for chills, fever, sweats.   EYES: Reports ongoing itching and pain from OD, but no change in acuity from that eye. OS without symptoms.  Recent hospitalization prevented him from seeing Ophthalmology.  ENT/MOUTH: NEGATIVE for nasal congestion, sinus pressure. No recurrence of epistaxis since last visit.  RESP: NEGATIVE for dyspnea, wheeze, or respiratory chest pain. Cough has been mild/minimal since last visit.  CV: NEGATIVE for orthopnea, or worsened lower extremity edema.   GI: See above.  Constipation is now generally well-controlled on Senna 1 or 2 per day; has had daily BM since ED visit.  : Mcarthur remains in place.  NEGATIVE for hematuria, dysuria, or flank pain.   MUSCULOSKELETAL: NEGATIVE for change or worsening in right hip pain.  Neck and head pain are unchanged.  Currently using only rare oxycodone.    INTEG: Previous intertriginous rash in groin has improved. No rashes.  NEURO: NEGATIVE for new or worsened focal numbness or weakness or syncope.    PSYCHIATRIC: NEGATIVE for changes in mild baseline anxiety, no panic, no recent change in mood.      OBJECTIVE:                                                    BP 90/66 (BP Location: Right arm, Patient Position: Semi-Martin's, Cuff Size: Adult Regular)  Pulse 64  Resp 14  SpO2 99% on room air.  There is no height or weight on file to calculate BMI.    GENERAL: Appears clean and comfortable, propped upright in bed.  EYES: Eyes grossly normal to inspection, no conjunctivitis or discharge.  HENT: Nares patent by sniff, no discharge.    NECK: Trachea midline, no stridor.    RESP: No accessory muscle use. No cough during this visit. Symmetrical breath sounds. Lungs clear throughout on inspiration and expiration. Expiration not prolonged, no wheeze.  CV: Regular rate and rhythm, non-tachycardic. Prominent LVAD noise, which sounds like someone is running a vacuum  in the near background, makes exam  "difficult. S1 S2 softly audible, no murmur or extra sound. No lower extremity edema. Extremities slightly cool x4.  ABDOMEN: LVAD entry site not undressed for exam. Protuberant, though soft, non-tender, no guarding over all quadrants. Liver and spleen not palpable, no masses palpable. Bowel sounds positive.  : Mcarthur in place, no suggestion of hematuria in collection bag.  External genitalia normal.  MS: No bony deformities noted, including at fractured right femur. No pain reproduced by passive ROM of either hip or leg. No red or inflamed joints.  .  SKIN: Warm and dry, no rashes where skin visible.    NEURO: Alert and conversant, oriented and appropriate in conversation per , though some very mild dysarthria present. Mild left facial droop noted, cranial nerves II - XII otherwise grossly intact.  Dense left upper and lower extremity paresis persists.  PSYCH: Calm, conversant. Language barrier prevents good exam, though affect appears normal.       ASSESSMENT/PLAN:                                                      (K81.9) Acalculous cholecystitis due to (K83.1) Biliary tract obstruction  (primary encounter diagnosis)  Comment: I do not find etiology of obstruction in hospital notes.  Biliary stent placed 5/9/17 with resolution of pain and associated symptoms.  Aside from the sensation of abdominal \"bloating\", patient feeling generally well.  PO diet going OK, without emesis.  Plan: Comprehensive metabolic panel ordered as long as we're drawing Hgb (see below).  Date of GI follow-up TBA.    (R33.9) Urinary retention due to (N40.1) Benign non-nodular prostatic hyperplasia with lower urinary tract symptoms  Comment: Patient was unable to void in-hospital, so Mcarthur was placed and remains in place.  Urology has started finasteride and tamsulosin.  Patient has Urology follow-up tomorrow for an attempt at Mcarthur removal.  Plan: Await findings at Urology visit tomorrow.    (D62) Acute posthemorrhagic " anemia  Comment: Has element of chronic anemia, baseline Hgb has been in the 11's.  GI blood loss during recent hospital stay resulted in doris Hgb of 8.4 and discharge Hgb of 8.7.  Patient continues to have daily melena since discharge.  He remains on warfarin, target INR 1.8 to 2.3 per Cardiology (lower INR selected initially due to gross hematuria, though GI blood loss adds another indication for a lower target INR).  Plan: CBC with platelets ordered for today.  If Hgb remains approx 8.7, we can probably just observe and recheck Hgb next week.  If Hgb lower, may need evaluation from GI urgently.    FUTURE LABS:       - Schedule a non-fasting blood draw today, details above.    FUTURE APPOINTMENTS:       - Follow-up visit scheduled for 6/13/17 at 1400.    Face to face time was 35 minutes, at least 50% of which was spent in counseling patient and daughters regarding management of anemia and ongoing melena, as well as urinary obstructive symptoms.      Thomas Dill MD  [facility]

## 2017-05-19 ENCOUNTER — ANTICOAGULATION THERAPY VISIT (OUTPATIENT)
Dept: ANTICOAGULATION | Facility: CLINIC | Age: 67
End: 2017-05-19

## 2017-05-19 ENCOUNTER — ALLIED HEALTH/NURSE VISIT (OUTPATIENT)
Dept: UROLOGY | Facility: CLINIC | Age: 67
End: 2017-05-19

## 2017-05-19 DIAGNOSIS — Z79.01 LONG-TERM (CURRENT) USE OF ANTICOAGULANTS: ICD-10-CM

## 2017-05-19 DIAGNOSIS — Z95.811 LVAD (LEFT VENTRICULAR ASSIST DEVICE) PRESENT (H): ICD-10-CM

## 2017-05-19 DIAGNOSIS — R33.9 URINARY RETENTION: Primary | ICD-10-CM

## 2017-05-19 LAB — INR PPP: 1.8

## 2017-05-19 NOTE — MR AVS SNAPSHOT
After Visit Summary   5/19/2017    Sohan Rendon    MRN: [id number]           Patient Information     Date Of Birth          1950        Visit Information        Provider Department      5/19/2017 10:15 AM Nurse, [facility]; [facility]        Today's Diagnoses     Urinary retention    -  1       Follow-ups after your visit        Your next 10 appointments already scheduled     Jun 13, 2017  2:00 PM CDT   Return Visit with Thomas Dill MD   [facility] ([facility])    [address]  [address]  [facility] [address]   [phone]            Jun 22, 2017  4:20 PM CDT   (Arrive by 4:05 PM)   Return Visit with Sebastian Meier MD   [facility]rolog[facility] ([facility])    [address] [place] [address]   [phone]            Jul 18, 2017  2:00 PM CDT   Return Visit with Thomas Dill MD   [facility] ([facility])    [address]  [address]  [facility] [address]   [phone]            Aug 15, 2017  2:00 PM CDT   Return Visit with Thomas Dill MD   [facility] ([facility])    [address]  [address]  [facility] [address]   [phone]            Sep 12, 2017  2:00 PM CDT   Return Visit with Thomas Dill MD   [facility] ([facility])    [address]  [address]  [facility] [address]   [phone]            Oct 10, 2017  2:00 PM CDT   Return Visit with Thomas Dill MD   [facility] ([facility])    [address]  [address]  [facility] [address]   [phone]              Future tests that were ordered for you today     Open Future Orders        Priority Expected Expires  Ordered    CBC with platelets Routine 2017    Comprehensive metabolic panel (BMP + Alb, Alk Phos, ALT, AST, Total. Bili, TP) Routine 2017    Troponin I Routine 2017            Who to contact     Please call your clinic at [phone] to:    Ask questions about your health    Make or cancel appointments    Discuss your medicines    Learn about your test results    Speak to your doctor   If you have compliments or concerns about an experience at your clinic, or if you wish to file a complaint, please contact [facility] Patient Relations at [phone] or email us at [email or web address]         Additional Information About Your Visit        Trigence Information     Trigence is an electronic gateway that provides easy, online access to your medical records. With Trigence, you can request a clinic appointment, read your test results, renew a prescription or communicate with your care team.     To sign up for Trigence visit the website at [email or web address]   You will be asked to enter the access code listed below, as well as some personal information. Please follow the directions to create your username and password.     Your access code is: [email or web address]  Expires: 2017 12:00 PM     Your access code will  in 90 days. If you need help or a new code, please contact your [facility] or call [phone] for assistance.        Care EveryWhere ID     This is your Care EveryWhere ID. This could be used by other organizations to access your [place] medical records  [id number]         Blood Pressure from Last 3 Encounters:   17 90/66   05/15/17 101/78   17 (!) 154/111    Weight from Last 3 Encounters:   05/15/17 86.3 kg (190 lb 4.1 oz)   17 81.6 kg (180 lb)   17 81.6 kg (180 lb)              Today, you had the following     No orders found for  display         Today's Medication Changes          These changes are accurate as of: 5/19/17 11:01 AM.  If you have any questions, ask your nurse or doctor.               These medicines have changed or have updated prescriptions.        Dose/Directions    acetaminophen 325 MG tablet   Commonly known as:  TYLENOL   This may have changed:    - when to take this  - reasons to take this   Used for:  Femoral neck fracture, right, closed, initial encounter        Dose:  650 mg   Take 2 tablets (650 mg) by mouth every 6 hours   Quantity:  100 tablet   Refills:  0       azelastine 0.05 % Soln ophthalmic solution   Commonly known as:  OPTIVAR   This may have changed:    - when to take this  - reasons to take this        Dose:  1 drop   Apply 1 drop to eye 2 times daily   Quantity:  1 Bottle   Refills:  11                Primary Care Provider Office Phone # Fax #    Thomas Dill [phone]        [facility] [address] [place] [address] 602     [facility] [address]        Thank you!     Thank you for choosing [facility] AND [facility] PROSTATE [facility] UROLOGIC CANCERS  for your care. Our goal is always to provide you with excellent care. Hearing back from our patients is one way we can continue to improve our services. Please take a few minutes to complete the written survey that you may receive in the mail after your visit with us. Thank you!             Your Updated Medication List - Protect others around you: Learn how to safely use, store and throw away your medicines at www.disposemymeds.org.          This list is accurate as of: 5/19/17 11:01 AM.  Always use your most recent med list.                   Brand Name Dispense Instructions for use    acetaminophen 325 MG tablet    TYLENOL    100 tablet    Take 2 tablets (650 mg) by mouth every 6 hours       allopurinol 100 MG tablet    ZYLOPRIM    90 tablet    Take 1 tablet (100 mg) by mouth daily       ascorbic acid 500 MG tablet    VITAMIN C     90 tablet    Take 1 tablet (500 mg) by mouth daily With iron pill       atorvastatin 10 MG tablet    LIPITOR    90 tablet    TAKE 1 TABLET (10 MG) BY MOUTH DAILY       azelastine 0.05 % Soln ophthalmic solution    OPTIVAR    1 Bottle    Apply 1 drop to eye 2 times daily       * bisacodyl 5 MG EC tablet    DULCOLAX    20 tablet    Take 1 tablet (5 mg) by mouth daily as needed for constipation       * bisacodyl 10 MG Suppository    DULCOLAX    5 suppository    Place 1 suppository (10 mg) rectally daily as needed for constipation       blood glucose monitoring lancets     1 Box    Use to test blood sugars 2 times daily or as directed.       blood glucose monitoring test strip    no brand specified    1 Box    Use to test blood sugar 2 times daily or as directed.       calcium carbonate-vitamin D 500-400 MG-UNIT Tabs per tablet     90 tablet    Take 1 tablet by mouth daily       carboxymethylcellulose 0.5 % Soln ophthalmic solution    REFRESH PLUS    30 mL    Place 1 drop into the right eye 3 times daily as needed for other (eye irritation or discomfort.)       ciprofloxacin 500 MG tablet    CIPRO    16 tablet    Take 1 tablet (500 mg) by mouth every 12 hours for 8 days       * finasteride 5 MG tablet    PROSCAR    90 tablet    Take 1 tablet (5 mg) by mouth daily       * finasteride 5 MG tablet    PROSCAR    30 tablet    Take 1 tablet (5 mg) by mouth daily       guaiFENesin-dextromethorphan 100-10 MG/5ML syrup    ROBITUSSIN DM    560 mL    Take 5 mLs by mouth every 4 hours as needed for cough       lactobacillus rhamnosus (GG) capsule     22 capsule    Take 1 capsule by mouth 2 times daily for 11 days       levETIRAcetam 750 MG tablet    KEPPRA    180 tablet    Take 1 tablet (750 mg) by mouth 2 times daily       loratadine 10 MG tablet    CLARITIN    90 tablet    Take 1 tablet (10 mg) by mouth daily       losartan 25 MG tablet    COZAAR    45 tablet    Take 1 tablet (25 mg) by mouth daily       magnesium oxide 400 MG  tablet    MAG-OX    14 tablet    Take 1 tablet (400 mg) by mouth 2 times daily       melatonin 3 MG tablet      Take 1 tablet (3 mg) by mouth At Bedtime       * metoprolol 25 MG 24 hr tablet    TOPROL-XL    90 tablet    TAKE 1 TABLET (25 MG) BY MOUTH EVERY EVENING       * metoprolol 50 MG 24 hr tablet    TOPROL-XL    90 tablet    Take 1 tablet (50 mg) by mouth daily in the morning.       nitroglycerin 0.4 MG sublingual tablet    NITROSTAT    30 tablet    Place 1 tablet (0.4 mg) under the tongue every 5 minutes as needed for chest pain       nystatin 553498 UNIT/GM Powd    MYCOSTATIN    60 g    Apply to affected areas of skin in the groin and on the scrotum three times a day as needed.       olopatadine 0.1 % ophthalmic solution    PATANOL    1 Bottle    Place 1 drop into both eyes 2 times daily       * order for DME     1 Device    Equipment being ordered: Lift chair.  Please see indications and face-to-face encounter details in 2/3/15 Office Visit note.       * order for DME     2 each    Equipment being ordered: Bilateral leg supports for manual wheelchair.       * order for DME     1 each    Equipment being ordered: Reclining manual w/c with bilateral elevating leg rests.       * order for DME     1 each    Equipment being ordered: Hospital Bed, fully electric.       * order for DME     1 each    Equipment being ordered: Wheelchair, manual.       * order for DME     1 each    Equipment being ordered: Wheelchair, manual, with elevated leg rests and tilt in space back.  Please fax to [facility]; I called them 11/26/16 and they requested the new order.  Face to face is in my 10/26/16 note.       * order for DME     2 each    Equipment being ordered: Cushioned heel boots.       oxyCODONE 5 MG IR tablet    ROXICODONE    30 tablet    Take 1 tablet (5 mg) by mouth every 4 hours as needed for moderate to severe pain       pantoprazole 40 MG EC tablet    PROTONIX    180 tablet    Take 1 tablet (40 mg) by mouth daily        senna-docusate 8.6-50 MG per tablet    SENOKOT-S;PERICOLACE    60 tablet    Take 1-2 tablets by mouth 2 times daily as needed for constipation       simethicone 80 MG chewable tablet    MYLICON    180 tablet    Take 1 tablet (80 mg) by mouth 4 times daily       tamsulosin 0.4 MG capsule    FLOMAX    90 capsule    Take 1 capsule (0.4 mg) by mouth daily       thiamine 100 MG tablet     90 tablet    TAKE 1 TABLET (100 MG) BY MOUTH DAILY       warfarin 3 MG tablet    COUMADIN    180 tablet    Take 3mg by mouth on Mondays and Thursdays. Take 4.5mg by mouth all other days of the week.       * Notice:  This list has 13 medication(s) that are the same as other medications prescribed for you. Read the directions carefully, and ask your doctor or other care provider to review them with you.

## 2017-05-19 NOTE — MR AVS SNAPSHOT
Sohan [facility]   5/19/2017   Anticoagulation Therapy Visit    Description:  67 year old male   Provider:  Dafne Sanders RN   Department:  [facility]           INR as of 5/19/2017     Today's INR 1.8      Anticoagulation Summary as of 5/19/2017     INR goal    Prior goal 1.8-2.5   Today's INR 1.8   Full instructions 4.5 mg on Sun, Tue, Thu; 3 mg all other days   Next INR check 5/23/2017    Indications   LVAD (left ventricular assist device) present [Z95.811]  Long-term (current) use of anticoagulants [Z79.01] [Z79.01]         May 2017 Details    Sun Mon Tue Wed Thu Fri Sat      1               2               3               4               5               6                 7               8               9               10               11               12               13                 14               15               16               17               18               19      3 mg   See details      20      3 mg           21      4.5 mg         22      3 mg         23            24               25               26               27                 28               29               30               31                   Date Details   05/19 This INR check       Date of next INR:  5/23/2017         How to take your warfarin dose     To take:  3 mg Take 1 of the 3 mg tablets.    To take:  4.5 mg Take 1.5 of the 3 mg tablets.

## 2017-05-19 NOTE — PROGRESS NOTES
ANTICOAGULATION FOLLOW-UP CLINIC VISIT    Patient Name:  Sohan Rendon  Date:  5/19/2017  Contact Type:  Telephone    SUBJECTIVE:     Patient Findings     Positives Antibiotic use or infection (5/15-5/22-cipro)           OBJECTIVE    INR   Date Value Ref Range Status   05/19/2017 1.8  Final     Chromogenic Factor 10   Date Value Ref Range Status   08/10/2014 24 (L) 70 - 130 % Final     Comment:     Therapeutic Range:  A Chromogenic Factor 10 level of approximately 20-40%   inversely correlates with an INR of 2-3 for patients receiving Warfarin.   Chromogenic Factor 10 levels below 20% indicate an INR greater than 3 and   levels above 40% indicate an INR less than 2.       Factor 2 Assay   Date Value Ref Range Status   07/21/2014 25 (L) 60 - 140 % Final     Comment:     Analyte Specific Reagents (ASRs) are used in many laboratory tests necessary   for   standard medical care and generally do not require FDA approval.  This test   was   developed and its performance characteristics determined by [facility].  It has not been cleared or approved by   the US Food and Drug Administration.         ASSESSMENT / PLAN  INR assessment THER    Recheck INR In: 4 DAYS    INR Location Clinic      Anticoagulation Summary as of 5/19/2017     INR goal    Prior goal 1.8-2.5   Today's INR 1.8   Maintenance plan 4.5 mg (3 mg x 1.5) on Sun, Tue, Thu; 3 mg (3 mg x 1) all other days   Full instructions 4.5 mg on Sun, Tue, Thu; 3 mg all other days   Weekly total 25.5 mg   Plan last modified Blanca Bah RN (2/27/2017)   Next INR check 5/23/2017   Priority INR   Target end date Indefinite    Indications   LVAD (left ventricular assist device) present [Z95.811]  Long-term (current) use of anticoagulants [Z79.01] [Z79.01]         Anticoagulation Episode Summary     INR check location     Preferred lab     Send INR reminders to [facility]    Comments Goal Range is 1.8-2.3  [facility] comes out to draw INR  Daughter Almaz Ph [phone]      Anticoagulation Care Providers     Provider Role Specialty Phone number    Evon Lemons MD Responsible Cardiology [phone]            See the Encounter Report to view Anticoagulation Flowsheet and Dosing Calendar (Go to Encounters tab in chart review, and find the Anticoagulation Therapy Visit)  Spoke with [facility] .    Dafne Sanders RN

## 2017-05-19 NOTE — NURSING NOTE
Catheter removal documentation on 5/19/2017:    Sohan Rendon presents to the clinic for catheter removal.  Reason for removal: physician orders  Order has been verified. yes  Catheter successfully removed at 10:59 AM without immediate complication.  200 cc's of urine present in the catheter bag.  Urethral meatus is free of secretions and encrustation.  The patient is afebrile.  The patient tolerated the procedure and was instructed to follow up with their PCP or consultant as planned and call ambulance if demonstrates retention.    Juan Moore

## 2017-05-20 ENCOUNTER — HOSPITAL ENCOUNTER (OUTPATIENT)
Dept: LAB | Facility: CLINIC | Age: 67
End: 2017-05-20
Payer: COMMERCIAL

## 2017-05-20 ENCOUNTER — TELEPHONE (OUTPATIENT)
Dept: NURSING | Facility: CLINIC | Age: 67
End: 2017-05-20

## 2017-05-21 LAB
ANION GAP SERPL CALCULATED.3IONS-SCNC: 8 MMOL/L (ref 3–14)
BUN SERPL-MCNC: 25 MG/DL (ref 7–30)
CALCIUM SERPL-MCNC: 8.7 MG/DL (ref 8.5–10.1)
CHLORIDE SERPL-SCNC: 108 MMOL/L (ref 94–109)
CO2 SERPL-SCNC: 23 MMOL/L (ref 20–32)
CREAT SERPL-MCNC: 1.76 MG/DL (ref 0.66–1.25)
ERYTHROCYTE [DISTWIDTH] IN BLOOD BY AUTOMATED COUNT: 18.2 % (ref 10–15)
GFR SERPL CREATININE-BSD FRML MDRD: 39 ML/MIN/1.7M2
GLUCOSE SERPL-MCNC: 145 MG/DL (ref 70–99)
HCT VFR BLD AUTO: 31.5 % (ref 40–53)
HGB BLD-MCNC: 9.7 G/DL (ref 13.3–17.7)
MCH RBC QN AUTO: 30.5 PG (ref 26.5–33)
MCHC RBC AUTO-ENTMCNC: 30.8 G/DL (ref 31.5–36.5)
MCV RBC AUTO: 99 FL (ref 78–100)
PLATELET # BLD AUTO: 316 10E9/L (ref 150–450)
POTASSIUM SERPL-SCNC: 4.2 MMOL/L (ref 3.4–5.3)
RBC # BLD AUTO: 3.18 10E12/L (ref 4.4–5.9)
SODIUM SERPL-SCNC: 139 MMOL/L (ref 133–144)
WBC # BLD AUTO: 6.4 10E9/L (ref 4–11)

## 2017-05-21 PROCEDURE — 85027 COMPLETE CBC AUTOMATED: CPT

## 2017-05-21 PROCEDURE — 80048 BASIC METABOLIC PNL TOTAL CA: CPT

## 2017-05-22 LAB — COPATH REPORT: NORMAL

## 2017-05-23 ENCOUNTER — ANTICOAGULATION THERAPY VISIT (OUTPATIENT)
Dept: ANTICOAGULATION | Facility: CLINIC | Age: 67
End: 2017-05-23

## 2017-05-23 DIAGNOSIS — Z95.811 LVAD (LEFT VENTRICULAR ASSIST DEVICE) PRESENT (H): ICD-10-CM

## 2017-05-23 DIAGNOSIS — Z79.01 LONG-TERM (CURRENT) USE OF ANTICOAGULANTS: ICD-10-CM

## 2017-05-23 LAB — INR PPP: 1.4

## 2017-05-24 ENCOUNTER — ANTICOAGULATION THERAPY VISIT (OUTPATIENT)
Dept: ANTICOAGULATION | Facility: CLINIC | Age: 67
End: 2017-05-24

## 2017-05-24 DIAGNOSIS — Z79.01 LONG-TERM (CURRENT) USE OF ANTICOAGULANTS: ICD-10-CM

## 2017-05-24 DIAGNOSIS — Z95.811 LVAD (LEFT VENTRICULAR ASSIST DEVICE) PRESENT (H): ICD-10-CM

## 2017-05-24 NOTE — PROGRESS NOTES
Almaz called to verify dosing.  She reports that they have 3mg strength tabs so she is unsure how to make the 5mg dosing that was recommended.  Almaz reports that her dad did take a 4.5mg dose of warfarin last night and will take 4.5mg today and tomorrow.  Next INR will be 5/26.  Almaz also reports that her dad is on probiotics. Almaz called back reporting her Dad last dose of probiotics was 5/24 and that they are done with them. Sarah Martinez RN

## 2017-05-24 NOTE — MR AVS SNAPSHOT
Sohan [facility]   5/24/2017   Anticoagulation Therapy Visit    Description:  67 year old male   Provider:  Blanca Bah, RN   Department:  [facility]           INR as of 5/24/2017     Today's INR       Anticoagulation Summary as of 5/24/2017     INR goal    Prior goal 1.8-2.5   Today's INR    Full instructions 5/24: 5 mg; 5/25: 5 mg; Otherwise 4.5 mg on Sun, Tue, Thu; 3 mg all other days   Next INR check     Indications   LVAD (left ventricular assist device) present [Z95.811]  Long-term (current) use of anticoagulants [Z79.01] [Z79.01]         May 2017 Details    Sun Mon Tue Wed Thu Fri Sat      1               2               3               4               5               6                 7               8               9               10               11               12               13                 14               15               16               17               18               19               20                 21               22               23               24      5 mg   See details      25      5 mg         26      3 mg         27      3 mg           28      4.5 mg         29      3 mg         30      4.5 mg         31      3 mg             Date Details   05/24 This INR check      Date of next INR: No date specified         How to take your warfarin dose     To take:  3 mg Take 1 of the 3 mg tablets.    To take:  4.5 mg Take 1.5 of the 3 mg tablets.    To take:  5 mg Take 1 of the 3 mg tablets and 2 of the 1 mg tablets.

## 2017-05-25 DIAGNOSIS — K59.01 SLOW TRANSIT CONSTIPATION: ICD-10-CM

## 2017-05-26 ENCOUNTER — ANTICOAGULATION THERAPY VISIT (OUTPATIENT)
Dept: ANTICOAGULATION | Facility: CLINIC | Age: 67
End: 2017-05-26

## 2017-05-26 DIAGNOSIS — Z79.01 LONG-TERM (CURRENT) USE OF ANTICOAGULANTS: ICD-10-CM

## 2017-05-26 DIAGNOSIS — Z95.811 LVAD (LEFT VENTRICULAR ASSIST DEVICE) PRESENT (H): ICD-10-CM

## 2017-05-26 RX ORDER — DOCUSATE SODIUM 50MG AND SENNOSIDES 8.6MG 8.6; 5 MG/1; MG/1
TABLET, FILM COATED ORAL
Qty: 60 TABLET | Refills: 3 | Status: ON HOLD | OUTPATIENT
Start: 2017-05-26 | End: 2018-01-01

## 2017-05-26 NOTE — MR AVS SNAPSHOT
Sohan [facility]   5/26/2017   Anticoagulation Therapy Visit    Description:  67 year old male   Provider:  Sarah Osborne, RN   Department:  [facility]           INR as of 5/26/2017     Today's INR No new INR was available at the time of this encounter.      Anticoagulation Summary as of 5/26/2017     INR goal    Prior goal 1.8-2.5   Today's INR No new INR was available at the time of this encounter.   Full instructions 4.5 mg on Sun, Tue, Thu; 3 mg all other days   Next INR check 5/30/2017    Indications   LVAD (left ventricular assist device) present [Z95.811]  Long-term (current) use of anticoagulants [Z79.01] [Z79.01]         May 2017 Details    Sun Mon Tue Wed Thu Fri Sat      1               2               3               4               5               6                 7               8               9               10               11               12               13                 14               15               16               17               18               19               20                 21               22               23               24               25               26      3 mg   See details      27      3 mg           28      4.5 mg         29      3 mg         30            31                   Date Details   05/26 This INR check       Date of next INR:  5/30/2017         How to take your warfarin dose     To take:  3 mg Take 1 of the 3 mg tablets.    To take:  4.5 mg Take 1.5 of the 3 mg tablets.

## 2017-05-26 NOTE — PROGRESS NOTES
ANTICOAGULATION FOLLOW-UP CLINIC VISIT    Patient Name:  Sohan Rendon  Date:  5/26/2017  Contact Type:  Telephone    SUBJECTIVE:     Patient Findings     Positives Change in medications (he has finished antibiotic and is not taking probiotics anymore)    Comments Home care nurse is not able to draw Sohan's blood today. She is having a hard time with blood draw.  Almaz would like another nurse to come out today to draw INR.  12:24 PM:  Almaz called back--Arpan has [facility] care coordinator on another line and they would like me to talk with her over speaker phone.  Per Melissa (care coordinator), another nurse is not available today.  They will follow dosing given and recheck INR with a venous draw on 5/30/17.  Dosing is based on Sohan finishing antibiotics and is off probiotics--will go back to his maintenance dose.  Went over this with Almaz.             OBJECTIVE    INR   Date Value Ref Range Status   05/23/2017 1.4  Final     Chromogenic Factor 10   Date Value Ref Range Status   08/10/2014 24 (L) 70 - 130 % Final     Comment:     Therapeutic Range:  A Chromogenic Factor 10 level of approximately 20-40%   inversely correlates with an INR of 2-3 for patients receiving Warfarin.   Chromogenic Factor 10 levels below 20% indicate an INR greater than 3 and   levels above 40% indicate an INR less than 2.       Factor 2 Assay   Date Value Ref Range Status   07/21/2014 25 (L) 60 - 140 % Final     Comment:     Analyte Specific Reagents (ASRs) are used in many laboratory tests necessary   for   standard medical care and generally do not require FDA approval.  This test   was   developed and its performance characteristics determined by [facility].  It has not been cleared or approved by   the [place] Food and Drug Administration.         ASSESSMENT / PLAN  No question data found.  Anticoagulation Summary as of 5/26/2017     INR goal    Prior goal 1.8-2.5   Today's INR No new  INR was available at the time of this encounter.   Maintenance plan 4.5 mg (3 mg x 1.5) on Sun, Tue, Thu; 3 mg (3 mg x 1) all other days   Full instructions 4.5 mg on Sun, Tue, Thu; 3 mg all other days   Weekly total 25.5 mg   Plan last modified Dafne Sanders RN (5/23/2017)   Next INR check 5/30/2017   Priority INR   Target end date Indefinite    Indications   LVAD (left ventricular assist device) present [Z95.811]  Long-term (current) use of anticoagulants [Z79.01] [Z79.01]         Anticoagulation Episode Summary     INR check location     Preferred lab     Send INR reminders to  [facility]    Comments Goal Range is 1.8-2.3  [facility] comes out to draw INR  Zaina Muse Ph [phone]      Anticoagulation Care Providers     Provider Role Specialty Phone number    Evon Lemons MD Responsible Cardiology [phone]            See the Encounter Report to view Anticoagulation Flowsheet and Dosing Calendar (Go to Encounters tab in chart review, and find the Anticoagulation Therapy Visit)    Spoke with daughter, HHN, Arpan, and [facility] coordinator, Melissa.  Home care nurse is not able to draw Sohan's blood today. She is having a hard time with blood draw.  Almaz would like another nurse to come out today to draw INR.  12:24 PM:  Almaz called back--Arpan has [facility] care coordinator on another line and they would like me to talk with her over speaker phone.  Per Melissa (care coordinator), another nurse is not available today.  They will follow dosing given and recheck INR with a venous draw on 5/30/17.  Dosing is based on Sohan finishing antibiotics and is off probiotics--will go back to his maintenance dose.  Went over this with Almaz.      Sarah Osborne, RN

## 2017-05-30 ENCOUNTER — ANTICOAGULATION THERAPY VISIT (OUTPATIENT)
Dept: ANTICOAGULATION | Facility: CLINIC | Age: 67
End: 2017-05-30

## 2017-05-30 DIAGNOSIS — Z95.811 LVAD (LEFT VENTRICULAR ASSIST DEVICE) PRESENT (H): ICD-10-CM

## 2017-05-30 DIAGNOSIS — Z79.01 LONG-TERM (CURRENT) USE OF ANTICOAGULANTS: ICD-10-CM

## 2017-05-30 LAB — INR PPP: 1.6

## 2017-05-30 NOTE — MR AVS SNAPSHOT
Sohan [facility]   5/30/2017   Anticoagulation Therapy Visit    Description:  67 year old male   Provider:  Blanca Bah, RN   Department:  [facility]           INR as of 5/30/2017     Today's INR 1.6!      Anticoagulation Summary as of 5/30/2017     INR goal    Prior goal 1.8-2.5   Today's INR 1.6!   Full instructions 5/30: 4.5 mg; 5/31: 4.5 mg; 6/1: 4.5 mg   Next INR check 6/2/2017    Indications   LVAD (left ventricular assist device) present [Z95.811]  Long-term (current) use of anticoagulants [Z79.01] [Z79.01]         May 2017 Details    Sun Mon Tue Wed Thu Fri Sat      1               2               3               4               5               6                 7               8               9               10               11               12               13                 14               15               16               17               18               19               20                 21               22               23               24               25               26               27                 28               29               30      4.5 mg   See details      31      4.5 mg             Date Details   05/30 This INR check               How to take your warfarin dose     To take:  4.5 mg Take 1.5 of the 3 mg tablets.           June 2017 Details    Sun Mon Tue Wed Thu Fri Sat         1      4.5 mg         2            3                 4               5               6               7               8               9               10                 11               12               13               14               15               16               17                 18               19               20               21               22               23               24                 25               26               27               28               29               30                 Date Details   No additional details    Date of next INR:  6/2/2017         How to take  your warfarin dose     To take:  4.5 mg Take 1.5 of the 3 mg tablets.

## 2017-05-30 NOTE — PROGRESS NOTES
ANTICOAGULATION FOLLOW-UP CLINIC VISIT    Patient Name:  Sohan Rendon  Date:  5/30/2017  Contact Type:  Telephone    SUBJECTIVE:     Patient Findings     Comments Antibiotics discontinued and probiotics stopped.           OBJECTIVE    INR   Date Value Ref Range Status   05/30/2017 1.6  Final     Chromogenic Factor 10   Date Value Ref Range Status   08/10/2014 24 (L) 70 - 130 % Final     Comment:     Therapeutic Range:  A Chromogenic Factor 10 level of approximately 20-40%   inversely correlates with an INR of 2-3 for patients receiving Warfarin.   Chromogenic Factor 10 levels below 20% indicate an INR greater than 3 and   levels above 40% indicate an INR less than 2.       Factor 2 Assay   Date Value Ref Range Status   07/21/2014 25 (L) 60 - 140 % Final     Comment:     Analyte Specific Reagents (ASRs) are used in many laboratory tests necessary   for   standard medical care and generally do not require FDA approval.  This test   was   developed and its performance characteristics determined by [facility].  It has not been cleared or approved by   the US Food and Drug Administration.         ASSESSMENT / PLAN  INR assessment SUB    Recheck INR In: 3 DAYS    INR Location Clinic      Anticoagulation Summary as of 5/30/2017     INR goal    Prior goal 1.8-2.5   Today's INR 1.6!   Maintenance plan No maintenance plan   Full instructions 5/30: 4.5 mg; 5/31: 4.5 mg; 6/1: 4.5 mg   Plan last modified Blanca Bah RN (5/30/2017)   Next INR check 6/2/2017   Priority INR   Target end date Indefinite    Indications   LVAD (left ventricular assist device) present [Z95.811]  Long-term (current) use of anticoagulants [Z79.01] [Z79.01]         Anticoagulation Episode Summary     INR check location     Preferred lab     Send INR reminders to [facility]    Comments Goal Range is 1.8-2.3  [facility] comes out to draw INR  Daughter Almaz Ph [phone]       Anticoagulation Care Providers     Provider Role Specialty Phone number    Evon Lemons MD Responsible Cardiology [phone]            See the Encounter Report to view Anticoagulation Flowsheet and Dosing Calendar (Go to Encounters tab in chart review, and find the Anticoagulation Therapy Visit)    Spoke with Sofya MCKINNON.    Blanca Bah RN

## 2017-06-02 ENCOUNTER — ANTICOAGULATION THERAPY VISIT (OUTPATIENT)
Dept: ANTICOAGULATION | Facility: CLINIC | Age: 67
End: 2017-06-02

## 2017-06-02 DIAGNOSIS — Z79.01 LONG-TERM (CURRENT) USE OF ANTICOAGULANTS: ICD-10-CM

## 2017-06-02 DIAGNOSIS — Z95.811 LVAD (LEFT VENTRICULAR ASSIST DEVICE) PRESENT (H): ICD-10-CM

## 2017-06-02 NOTE — PROGRESS NOTES
ANTICOAGULATION FOLLOW-UP CLINIC VISIT    Patient Name:  Sohan Rendon  Date:  6/2/2017  Contact Type:  Telephone    SUBJECTIVE:     Patient Findings     Comments Spoke with Sofya from  [facility] and a nurse was suppose to come out to visit pt, but was not able to do so. Gave instructions to daughter Halina           OBJECTIVE    INR   Date Value Ref Range Status   05/30/2017 1.6  Final     Chromogenic Factor 10   Date Value Ref Range Status   08/10/2014 24 (L) 70 - 130 % Final     Comment:     Therapeutic Range:  A Chromogenic Factor 10 level of approximately 20-40%   inversely correlates with an INR of 2-3 for patients receiving Warfarin.   Chromogenic Factor 10 levels below 20% indicate an INR greater than 3 and   levels above 40% indicate an INR less than 2.       Factor 2 Assay   Date Value Ref Range Status   07/21/2014 25 (L) 60 - 140 % Final     Comment:     Analyte Specific Reagents (ASRs) are used in many laboratory tests necessary   for   standard medical care and generally do not require FDA approval.  This test   was   developed and its performance characteristics determined by [facility].  It has not been cleared or approved by   the US Food and Drug Administration.         ASSESSMENT / PLAN  No question data found.  Anticoagulation Summary as of 6/2/2017     INR goal    Prior goal 1.8-2.5   Today's INR No new INR was available at the time of this encounter.   Maintenance plan No maintenance plan   Full instructions 6/2: 3 mg; 6/3: 3 mg; 6/4: 4.5 mg   Plan last modified Blanca Bah RN (5/30/2017)   Next INR check 6/5/2017   Priority INR   Target end date Indefinite    Indications   LVAD (left ventricular assist device) present [Z95.811]  Long-term (current) use of anticoagulants [Z79.01] [Z79.01]         Anticoagulation Episode Summary     INR check location     Preferred lab     Send INR reminders to [facility]    Comments  Goal Range is 1.8-2.3  [facility] comes out to draw INR  Alex Muse Ph [phone]      Anticoagulation Care Providers     Provider Role Specialty Phone number    Evon Lemons MD Responsible Cardiology [phone]            See the Encounter Report to view Anticoagulation Flowsheet and Dosing Calendar (Go to Encounters tab in chart review, and find the Anticoagulation Therapy Visit)    Spoke with alex Muse and Sofya, [facility]. Gave them new warfarin recommendation.  No changes in health, medication, or diet. No missed doses, no falls. No signs or symptoms of bleed or clotting.     Sarah Martinez RN

## 2017-06-02 NOTE — MR AVS SNAPSHOT
Sohan [facility]   6/2/2017   Anticoagulation Therapy Visit    Description:  67 year old male   Provider:  Sarah Martinez, RN   Department:  [facility]           INR as of 6/2/2017     Today's INR No new INR was available at the time of this encounter.      Anticoagulation Summary as of 6/2/2017     INR goal    Prior goal 1.8-2.5   Today's INR No new INR was available at the time of this encounter.   Full instructions 6/2: 3 mg; 6/3: 3 mg; 6/4: 4.5 mg   Next INR check 6/5/2017    Indications   LVAD (left ventricular assist device) present [Z95.811]  Long-term (current) use of anticoagulants [Z79.01] [Z79.01]         June 2017 Details    Sun Mon Tue Wed Thu Fri Sat         1               2      3 mg   See details      3      3 mg           4      4.5 mg         5            6               7               8               9               10                 11               12               13               14               15               16               17                 18               19               20               21               22               23               24                 25               26               27               28               29               30                 Date Details   06/02 This INR check       Date of next INR:  6/5/2017         How to take your warfarin dose     To take:  3 mg Take 1 of the 3 mg tablets.    To take:  4.5 mg Take 1.5 of the 3 mg tablets.

## 2017-06-05 ENCOUNTER — ANTICOAGULATION THERAPY VISIT (OUTPATIENT)
Dept: ANTICOAGULATION | Facility: CLINIC | Age: 67
End: 2017-06-05

## 2017-06-05 DIAGNOSIS — Z95.811 LVAD (LEFT VENTRICULAR ASSIST DEVICE) PRESENT (H): ICD-10-CM

## 2017-06-05 DIAGNOSIS — Z79.01 LONG-TERM (CURRENT) USE OF ANTICOAGULANTS: ICD-10-CM

## 2017-06-05 LAB — INR POINT OF CARE: 1.8 (ref 0.86–1.14)

## 2017-06-05 NOTE — MR AVS SNAPSHOT
Sohan [facility]   6/5/2017   Anticoagulation Therapy Visit    Description:  67 year old male   Provider:  Amy Godinez, RN   Department:  [facility]           INR as of 6/5/2017     Today's INR 1.8      Anticoagulation Summary as of 6/5/2017     INR goal    Prior goal 1.8-2.5   Today's INR 1.8   Full instructions 6/5: 4.5 mg; 6/6: 4.5 mg; 6/7: 4.5 mg   Next INR check 6/8/2017    Indications   LVAD (left ventricular assist device) present [Z95.811]  Long-term (current) use of anticoagulants [Z79.01] [Z79.01]         June 2017 Details    Sun Mon Tue Wed Thu Fri Sat         1               2               3                 4               5      4.5 mg   See details      6      4.5 mg         7      4.5 mg         8            9               10                 11               12               13               14               15               16               17                 18               19               20               21               22               23               24                 25               26               27               28               29               30                 Date Details   06/05 This INR check       Date of next INR:  6/8/2017         How to take your warfarin dose     To take:  4.5 mg Take 4.5 of the 1 mg tablets.

## 2017-06-05 NOTE — PROGRESS NOTES
ANTICOAGULATION FOLLOW-UP CLINIC VISIT    Patient Name:  Sohan Rendon  Date:  6/5/2017  Contact Type:  Telephone    SUBJECTIVE:     Patient Findings     Positives No Problem Findings           OBJECTIVE    INR Protime   Date Value Ref Range Status   06/05/2017 1.8 (A) 0.86 - 1.14 Final     Chromogenic Factor 10   Date Value Ref Range Status   08/10/2014 24 (L) 70 - 130 % Final     Comment:     Therapeutic Range:  A Chromogenic Factor 10 level of approximately 20-40%   inversely correlates with an INR of 2-3 for patients receiving Warfarin.   Chromogenic Factor 10 levels below 20% indicate an INR greater than 3 and   levels above 40% indicate an INR less than 2.       Factor 2 Assay   Date Value Ref Range Status   07/21/2014 25 (L) 60 - 140 % Final     Comment:     Analyte Specific Reagents (ASRs) are used in many laboratory tests necessary   for   standard medical care and generally do not require FDA approval.  This test   was   developed and its performance characteristics determined by [facility].  It has not been cleared or approved by   the [place] Food and Drug Administration.         ASSESSMENT / PLAN  INR assessment THER    Recheck INR In: 3 DAYS    INR Location [facility]      Anticoagulation Summary as of 6/5/2017     INR goal    Prior goal 1.8-2.5   Today's INR 1.8   Maintenance plan No maintenance plan   Full instructions 6/5: 4.5 mg; 6/6: 4.5 mg; 6/7: 4.5 mg   Plan last modified Blanca Bah, RN (5/30/2017)   Next INR check 6/8/2017   Priority INR   Target end date Indefinite    Indications   LVAD (left ventricular assist device) present [Z95.811]  Long-term (current) use of anticoagulants [Z79.01] [Z79.01]         Anticoagulation Episode Summary     INR check location     Preferred lab     [facility]    Comments Goal Range is 1.8-2.3  [facility] comes out to draw INR  Daughter Almaz Ph [phone]       Anticoagulation Care Providers     Provider Role Specialty Phone number    Evon Lemons MD Responsible Cardiology [phone]            See the Encounter Report to view Anticoagulation Flowsheet and Dosing Calendar (Go to Encounters tab in chart review, and find the Anticoagulation Therapy Visit)    Spoke with home care nurse Renetta Godinez RN

## 2017-06-08 ENCOUNTER — ANTICOAGULATION THERAPY VISIT (OUTPATIENT)
Dept: ANTICOAGULATION | Facility: CLINIC | Age: 67
End: 2017-06-08

## 2017-06-08 DIAGNOSIS — Z79.01 LONG-TERM (CURRENT) USE OF ANTICOAGULANTS: ICD-10-CM

## 2017-06-08 DIAGNOSIS — Z95.811 LVAD (LEFT VENTRICULAR ASSIST DEVICE) PRESENT (H): ICD-10-CM

## 2017-06-08 LAB — INR PPP: 1.4

## 2017-06-08 NOTE — PROGRESS NOTES
ANTICOAGULATION FOLLOW-UP CLINIC VISIT    Patient Name:  Sohan Rendon  Date:  6/8/2017  Contact Type:  Telephone    SUBJECTIVE:     Patient Findings     Positives Unexplained INR or factor level change           OBJECTIVE    INR   Date Value Ref Range Status   06/08/2017 1.4  Final     Chromogenic Factor 10   Date Value Ref Range Status   08/10/2014 24 (L) 70 - 130 % Final     Comment:     Therapeutic Range:  A Chromogenic Factor 10 level of approximately 20-40%   inversely correlates with an INR of 2-3 for patients receiving Warfarin.   Chromogenic Factor 10 levels below 20% indicate an INR greater than 3 and   levels above 40% indicate an INR less than 2.       Factor 2 Assay   Date Value Ref Range Status   07/21/2014 25 (L) 60 - 140 % Final     Comment:     Analyte Specific Reagents (ASRs) are used in many laboratory tests necessary   for   standard medical care and generally do not require FDA approval.  This test   was   developed and its performance characteristics determined by [facility].  It has not been cleared or approved by   the US Food and Drug Administration.         ASSESSMENT / PLAN  INR assessment SUB    Recheck INR In: 4 DAYS    INR Location [facility]R      Anticoagulation Summary as of 6/8/2017     INR goal    Prior goal 1.8-2.5   Today's INR 1.4!   Maintenance plan No maintenance plan   Full instructions 6/8: 6 mg; 6/9: 6 mg; 6/10: 4.5 mg; 6/11: 4.5 mg   Plan last modified Blanca Bah, RN (5/30/2017)   Next INR check 6/12/2017   Priority INR   Target end date Indefinite    Indications   LVAD (left ventricular assist device) present [Z95.811]  Long-term (current) use of anticoagulants [Z79.01] [Z79.01]         Anticoagulation Episode Summary     INR check location     Preferred lab     [facility]    Comments Goal Range is 1.8-2.3  [facility] comes out to draw INR  Daughter Almaz Ph [phone]       Anticoagulation Care Providers     Provider Role Specialty Phone number    Evon Lemons MD Responsible Cardiology [phone]            See the Encounter Report to view Anticoagulation Flowsheet and Dosing Calendar (Go to Encounters tab in chart review, and find the Anticoagulation Therapy Visit)    Spoke with home care nurse. No missed doses. Unclear why INR has decreased. Sohan is not on aspirin.    Sarah Gill RN

## 2017-06-08 NOTE — MR AVS SNAPSHOT
Sohan Rendon   6/8/2017   Anticoagulation Therapy Visit    Description:  67 year old male   Provider:  Sarah Gill, RN   Department:  [facility]           INR as of 6/8/2017     Today's INR 1.4!      Anticoagulation Summary as of 6/8/2017     INR goal    Prior goal 1.8-2.5   Today's INR 1.4!   Full instructions 6/8: 6 mg; 6/9: 6 mg; 6/10: 4.5 mg; 6/11: 4.5 mg   Next INR check 6/12/2017    Indications   LVAD (left ventricular assist device) present [Z95.811]  Long-term (current) use of anticoagulants [Z79.01] [Z79.01]         June 2017 Details    Sun Mon Tue Wed Thu Fri Sat         1               2               3                 4               5               6               7               8      6 mg   See details      9      6 mg         10      4.5 mg           11      4.5 mg         12            13               14               15               16               17                 18               19               20               21               22               23               24                 25               26               27               28               29               30                 Date Details   06/08 This INR check       Date of next INR:  6/12/2017         How to take your warfarin dose     To take:  4.5 mg Take 1.5 of the 3 mg tablets.    To take:  6 mg Take 2 of the 3 mg tablets.

## 2017-06-09 ENCOUNTER — CARE COORDINATION (OUTPATIENT)
Dept: GERIATRIC MEDICINE | Facility: CLINIC | Age: 67
End: 2017-06-09

## 2017-06-09 NOTE — PROGRESS NOTES
-)Received a call from Zari JONES from [facility] ([phone]). She states client has transferred to [facility] for financial responsibility but he still does not have a CADI CM. She stated she would talk with her supervisor on 6/12 to discuss the case and determine what the [facility] needs to submit for service authorizations. She will also talk with her supervisor about getting a CADI CM for client through [facility].  Nena Salazar RN  [facility]   [phone]

## 2017-06-12 LAB — INR POINT OF CARE: 1.6 (ref 0.86–1.14)

## 2017-06-13 ENCOUNTER — ANTICOAGULATION THERAPY VISIT (OUTPATIENT)
Dept: ANTICOAGULATION | Facility: CLINIC | Age: 67
End: 2017-06-13

## 2017-06-13 ENCOUNTER — OFFICE VISIT (OUTPATIENT)
Dept: FAMILY MEDICINE | Facility: CLINIC | Age: 67
End: 2017-06-13
Payer: COMMERCIAL

## 2017-06-13 VITALS
HEART RATE: 60 BPM | OXYGEN SATURATION: 100 % | SYSTOLIC BLOOD PRESSURE: 96 MMHG | DIASTOLIC BLOOD PRESSURE: 58 MMHG | RESPIRATION RATE: 16 BRPM

## 2017-06-13 DIAGNOSIS — J30.1 NON-SEASONAL ALLERGIC RHINITIS DUE TO POLLEN: ICD-10-CM

## 2017-06-13 DIAGNOSIS — Z95.811 LVAD (LEFT VENTRICULAR ASSIST DEVICE) PRESENT (H): ICD-10-CM

## 2017-06-13 DIAGNOSIS — Z96.1 PSEUDOPHAKIA, RIGHT EYE: ICD-10-CM

## 2017-06-13 DIAGNOSIS — N40.1 BENIGN NON-NODULAR PROSTATIC HYPERPLASIA WITH LOWER URINARY TRACT SYMPTOMS: ICD-10-CM

## 2017-06-13 DIAGNOSIS — R56.9 CONVULSIONS, UNSPECIFIED CONVULSION TYPE (H): ICD-10-CM

## 2017-06-13 DIAGNOSIS — G47.01 INSOMNIA DUE TO MEDICAL CONDITION: ICD-10-CM

## 2017-06-13 DIAGNOSIS — H26.9 CATARACT, RIGHT: ICD-10-CM

## 2017-06-13 DIAGNOSIS — K81.9 ACALCULOUS CHOLECYSTITIS: ICD-10-CM

## 2017-06-13 DIAGNOSIS — Z95.811 LVAD (LEFT VENTRICULAR ASSIST DEVICE) PRESENT (H): Primary | ICD-10-CM

## 2017-06-13 DIAGNOSIS — N18.3 CKD (CHRONIC KIDNEY DISEASE), STAGE 3 (MODERATE): ICD-10-CM

## 2017-06-13 DIAGNOSIS — S72.001K: ICD-10-CM

## 2017-06-13 DIAGNOSIS — K83.1 BILIARY TRACT OBSTRUCTION (H): ICD-10-CM

## 2017-06-13 DIAGNOSIS — H57.89 IRRITATION OF RIGHT EYE: ICD-10-CM

## 2017-06-13 DIAGNOSIS — I63.411 CEREBRAL INFARCTION DUE TO EMBOLISM OF RIGHT MIDDLE CEREBRAL ARTERY (H): ICD-10-CM

## 2017-06-13 DIAGNOSIS — D62 ACUTE POSTHEMORRHAGIC ANEMIA: ICD-10-CM

## 2017-06-13 DIAGNOSIS — E78.5 HYPERLIPIDEMIA, UNSPECIFIED HYPERLIPIDEMIA TYPE: ICD-10-CM

## 2017-06-13 DIAGNOSIS — Z79.01 LONG-TERM (CURRENT) USE OF ANTICOAGULANTS: ICD-10-CM

## 2017-06-13 DIAGNOSIS — I50.22 CHF (CONGESTIVE HEART FAILURE), NYHA CLASS IV, CHRONIC, SYSTOLIC (H): ICD-10-CM

## 2017-06-13 DIAGNOSIS — D50.0 IRON DEFICIENCY ANEMIA DUE TO CHRONIC BLOOD LOSS: ICD-10-CM

## 2017-06-13 DIAGNOSIS — R33.9 URINARY RETENTION: ICD-10-CM

## 2017-06-13 PROCEDURE — 99350 HOME/RES VST EST HIGH MDM 60: CPT

## 2017-06-13 NOTE — MR AVS SNAPSHOT
Sohan [facility]   6/13/2017   Anticoagulation Therapy Visit    Description:  67 year old male   Provider:  Amy Godinez, RN   [facility]           INR as of 6/13/2017     Today's INR 1.6! (6/12/2017)      Anticoagulation Summary as of 6/13/2017     INR goal    Prior goal 1.8-2.5   Today's INR 1.6! (6/12/2017)   Full instructions 6/13: 6 mg; 6/14: 4.5 mg   Next INR check 6/15/2017    Indications   LVAD (left ventricular assist device) present [Z95.811]  Long-term (current) use of anticoagulants [Z79.01] [Z79.01]         June 2017 Details    Sun Mon Tue Wed Thu Fri Sat         1               2               3                 4               5               6               7               8               9               10                 11               12               13      6 mg   See details      14      4.5 mg         15            16               17                 18               19               20               21               22               23               24                 25               26               27               28               29               30                 Date Details   06/13 This INR check       Date of next INR:  6/15/2017         How to take your warfarin dose     To take:  4.5 mg Take 4.5 of the 1 mg tablets.    To take:  6 mg Take 2 of the 3 mg tablets.

## 2017-06-13 NOTE — PROGRESS NOTES
ANTICOAGULATION FOLLOW-UP CLINIC VISIT    Patient Name:  Sohan Rendon  Date:  6/13/2017  Contact Type:  Telephone    SUBJECTIVE:     Patient Findings     Comments Home care RN reports pt is taking < tylenol           OBJECTIVE    INR Protime   Date Value Ref Range Status   06/12/2017 1.6 (A) 0.86 - 1.14 Final     Chromogenic Factor 10   Date Value Ref Range Status   08/10/2014 24 (L) 70 - 130 % Final     Comment:     Therapeutic Range:  A Chromogenic Factor 10 level of approximately 20-40%   inversely correlates with an INR of 2-3 for patients receiving Warfarin.   Chromogenic Factor 10 levels below 20% indicate an INR greater than 3 and   levels above 40% indicate an INR less than 2.       Factor 2 Assay   Date Value Ref Range Status   07/21/2014 25 (L) 60 - 140 % Final     Comment:     Analyte Specific Reagents (ASRs) are used in many laboratory tests necessary   for   standard medical care and generally do not require FDA approval.  This test   was   developed and its performance characteristics determined by [facility].  It has not been cleared or approved by   the [place] Food and Drug Administration.         ASSESSMENT / PLAN  INR assessment SUB    Recheck INR In: 3 DAYS    INR Location [facility]R      Anticoagulation Summary as of 6/13/2017     INR goal    Prior goal 1.8-2.5   Today's INR 1.6! (6/12/2017)   Maintenance plan No maintenance plan   Full instructions 6/13: 6 mg; 6/14: 4.5 mg   Plan last modified Blanca Bah, RN (5/30/2017)   Next INR check 6/15/2017   Priority INR   Target end date Indefinite    Indications   LVAD (left ventricular assist device) present [Z95.811]  Long-term (current) use of anticoagulants [Z79.01] [Z79.01]         Anticoagulation Episode Summary     INR check location     Preferred lab     [facility]ers [facility]    Comments Goal Range is 1.8-2.3  [facility] comes out to draw INR  Daughter Almaz Ph [phone]      Anticoagulation Care Providers     Provider Role Specialty Phone number    Evon Lemons MD Responsible Cardiology [phone]            See the Encounter Report to view Anticoagulation Flowsheet and Dosing Calendar (Go to Encounters tab in chart review, and find the Anticoagulation Therapy Visit)    Spoke with home care RN Sofya.  This transaction occurred on 6/12 & is being charted on 6/13 as Epic was unavailable on 6/12.  Pt is not on ASA    Amy Godinez RN

## 2017-06-13 NOTE — MR AVS SNAPSHOT
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)        Convulsions, unspecified convulsion type (H)        Acalculous cholecystitis        Biliary tract obstruction        Benign non-nodular prostatic hyperplasia with lower urinary tract symptoms        Iron deficiency anemia due to chronic blood loss        Non-seasonal allergic rhinitis         Irritation of right eye        Acute posthemorrhagic anemia        Urinary retention        CKD (chronic kidney disease), stage 3 (moderate)        Displaced fracture of right femoral neck, closed, with nonunion, subsequent encounter        Insomnia due to medical condition        Pseudophakia, right eye        Cataract, right          Care Instructions    1.  I ordered multiple labs today.  You'll receive a call to schedule the blood draw at home, then I'll let you know the results.    2.  Until we get the results back, please continue not to give iron or vitamin C.  The labs will tell us if we need to resume these.    3.  You don't need a referral from me to reschedule the Ophthalmology appointment that you had to miss during the May hospital stay.  Please go ahead and call to reschedule that visit.    4.  We made no medication changes today.    5.  I'll see you next on Tuesday, July 18th at about 2 PM.          Follow-ups after your visit        Your next 10 appointments already scheduled     Jun 22, 2017  4:20 PM CDT   (Arrive by  4:05 PM)   Return Visit with Sebastian Meier MD   [facility] ([facility])    [address]  4th Floor  [facility] [address]   [phone]            Jul 18, 2017  2:00 PM CDT   Return Visit with Thomas Dill MD   [facility] ([facility])    [address]  [address]  [facility] [address]   [phone]            Aug 15, 2017  2:00 PM CDT   Return Visit with Thomas Dill MD   [facility] ([facility])    [address]  [address]  [facility] [address]   [phone]            Sep 12, 2017  2:00 PM CDT   Return Visit with Thomas Dill MD   [facility] ([facility])    [address]  [address]  [facility] [address]   [phone]            Oct 10, 2017  2:00 PM CDT   Return Visit with Thomas Dill MD   [facility] ([facility])    [address]  [address]  [facility] [address]   [phone]              Future tests that were ordered for you today     Open Future Orders        Priority Expected Expires Ordered    Lipid Profile with reflex to direct LDL Routine 6/21/2017 7/7/2017 6/14/2017    Comprehensive metabolic panel (BMP + Alb, Alk Phos, ALT, AST, Total. Bili, TP) Routine 6/21/2017 7/7/2017 6/14/2017    CBC with platelets Routine 6/21/2017 7/7/2017 6/14/2017    Iron Routine 6/21/2017 7/7/2017 6/14/2017            Who to contact     If you have questions or need follow up information about today's clinic visit or your schedule please contact [facility] directly at [phone].  Normal or non-critical lab and imaging results will be communicated to you by MyChart, letter or phone within 4 business days after the clinic has received the results. If you do not hear from us within 7 days, please contact the clinic  "through [employer] or phone. If you have a critical or abnormal lab result, we will notify you by phone as soon as possible.  Submit refill requests through [employer] or call your pharmacy and they will forward the refill request to us. Please allow 3 business days for your refill to be completed.          Additional Information About Your Visit        [employer] Information     [employer] lets you send messages to your doctor, view your test results, renew your prescriptions, schedule appointments and more. To sign up, go to [email or web address] . Click on \"Log in\" on the left side of the screen, which will take you to the Welcome page. Then click on \"Sign up Now\" on the right side of the page.     You will be asked to enter the access code listed below, as well as some personal information. Please follow the directions to create your username and password.     Your access code is: [email or web address]  Expires: 2017  8:55 AM     Your access code will  in 90 days. If you need help or a new code, please call your [facility] clinic or [phone].        Care EveryWhere ID     This is your Care EveryWhere ID. This could be used by other organizations to access your [facility] medical records  [id number]        Your Vitals Were     Pulse Respirations Pulse Oximetry             60 16 100%          Blood Pressure from Last 3 Encounters:   17 96/58   17 90/66   05/15/17 101/78    Weight from Last 3 Encounters:   05/15/17 190 lb 4.1 oz (86.3 kg)   17 180 lb (81.6 kg)   17 180 lb (81.6 kg)                 Today's Medication Changes          These changes are accurate as of: 17 11:59 PM.  If you have any questions, ask your nurse or doctor.               These medicines have changed or have updated prescriptions.        Dose/Directions    acetaminophen 325 MG tablet   Commonly known as:  TYLENOL   This may have changed:    - when to take this  - reasons to take this   Used for:  Femoral neck fracture, " right, closed, initial encounter        Dose:  650 mg   Take 2 tablets (650 mg) by mouth every 6 hours   Quantity:  100 tablet   Refills:  0       azelastine 0.05 % Soln ophthalmic solution   Commonly known as:  OPTIVAR   This may have changed:    - when to take this  - reasons to take this        Dose:  1 drop   Apply 1 drop to eye 2 times daily   Quantity:  1 Bottle   Refills:  11                Primary Care Provider Office Phone # Fax #    Thomas Dill [phone]        [facility] [address] [place] [address] [address]     [facility] [address]        Thank you!     Thank you for choosing [facility]  for your care. Our goal is always to provide you with excellent care. Hearing back from our patients is one way we can continue to improve our services. Please take a few minutes to complete the written survey that you may receive in the mail after your visit with us. Thank you!             Your Updated Medication List - Protect others around you: Learn how to safely use, store and throw away your medicines at www.disposemymeds.org.          This list is accurate as of: 6/13/17 11:59 PM.  Always use your most recent med list.                   Brand Name Dispense Instructions for use    acetaminophen 325 MG tablet    TYLENOL    100 tablet    Take 2 tablets (650 mg) by mouth every 6 hours       allopurinol 100 MG tablet    ZYLOPRIM    90 tablet    Take 1 tablet (100 mg) by mouth daily       ascorbic acid 500 MG tablet    VITAMIN C    90 tablet    Take 1 tablet (500 mg) by mouth daily With iron pill       ASPIRIN NOT PRESCRIBED    INTENTIONAL    0 each    Please choose reason not prescribed, below       atorvastatin 10 MG tablet    LIPITOR    90 tablet    TAKE 1 TABLET (10 MG) BY MOUTH DAILY       azelastine 0.05 % Soln ophthalmic solution    OPTIVAR    1 Bottle    Apply 1 drop to eye 2 times daily       * bisacodyl 5 MG EC tablet    DULCOLAX    20 tablet    Take 1 tablet (5 mg)  by mouth daily as needed for constipation       * bisacodyl 10 MG Suppository    DULCOLAX    5 suppository    Place 1 suppository (10 mg) rectally daily as needed for constipation       blood glucose monitoring lancets     1 Box    Use to test blood sugars 2 times daily or as directed.       blood glucose monitoring test strip    no brand specified    1 Box    Use to test blood sugar 2 times daily or as directed.       calcium carbonate-vitamin D 500-400 MG-UNIT Tabs per tablet     90 tablet    Take 1 tablet by mouth daily       carboxymethylcellulose 0.5 % Soln ophthalmic solution    REFRESH PLUS    30 mL    Place 1 drop into the right eye 3 times daily as needed for other (eye irritation or discomfort.)       finasteride 5 MG tablet    PROSCAR    90 tablet    Take 1 tablet (5 mg) by mouth daily       guaiFENesin-dextromethorphan 100-10 MG/5ML syrup    ROBITUSSIN DM    560 mL    Take 5 mLs by mouth every 4 hours as needed for cough       levETIRAcetam 750 MG tablet    KEPPRA    180 tablet    Take 1 tablet (750 mg) by mouth 2 times daily       loratadine 10 MG tablet    CLARITIN    90 tablet    Take 1 tablet (10 mg) by mouth daily       losartan 25 MG tablet    COZAAR    45 tablet    Take 1 tablet (25 mg) by mouth daily       magnesium oxide 400 MG tablet    MAG-OX    14 tablet    Take 1 tablet (400 mg) by mouth 2 times daily       melatonin 3 MG tablet      Take 1 tablet (3 mg) by mouth At Bedtime       * metoprolol 25 MG 24 hr tablet    TOPROL-XL    90 tablet    TAKE 1 TABLET (25 MG) BY MOUTH EVERY EVENING       * metoprolol 50 MG 24 hr tablet    TOPROL-XL    90 tablet    Take 1 tablet (50 mg) by mouth daily in the morning.       nitroglycerin 0.4 MG sublingual tablet    NITROSTAT    30 tablet    Place 1 tablet (0.4 mg) under the tongue every 5 minutes as needed for chest pain       nystatin 524280 UNIT/GM Powd    MYCOSTATIN    60 g    Apply to affected areas of skin in the groin and on the scrotum three times a  day as needed.       olopatadine 0.1 % ophthalmic solution    PATANOL    1 Bottle    Place 1 drop into both eyes 2 times daily       * order for DME     1 Device    Equipment being ordered: Lift chair.  Please see indications and face-to-face encounter details in 2/3/15 Office Visit note.       * order for DME     2 each    Equipment being ordered: Bilateral leg supports for manual wheelchair.       * order for DME     1 each    Equipment being ordered: Reclining manual w/c with bilateral elevating leg rests.       * order for DME     1 each    Equipment being ordered: Hospital Bed, fully electric.       * order for DME     1 each    Equipment being ordered: Wheelchair, manual.       * order for DME     1 each    Equipment being ordered: Wheelchair, manual, with elevated leg rests and tilt in space back.  Please fax to [facility] [place]; I called them 11/26/16 and they requested the new order.  Face to face is in my 10/26/16 note.       * order for DME     2 each    Equipment being ordered: Cushioned heel boots.       oxyCODONE 5 MG IR tablet    ROXICODONE    30 tablet    Take 1 tablet (5 mg) by mouth every 4 hours as needed for moderate to severe pain       pantoprazole 40 MG EC tablet    PROTONIX    180 tablet    Take 1 tablet (40 mg) by mouth daily       SENEXON-S 8.6-50 MG per tablet   Generic drug:  senna-docusate     60 tablet    TAKE 1-2 TABLETS BY MOUTH 2 TIMES DAILY AS NEEDED FOR CONSTIPATION       simethicone 80 MG chewable tablet    MYLICON    180 tablet    Take 1 tablet (80 mg) by mouth 4 times daily       tamsulosin 0.4 MG capsule    FLOMAX    90 capsule    Take 1 capsule (0.4 mg) by mouth daily       thiamine 100 MG tablet     90 tablet    TAKE 1 TABLET (100 MG) BY MOUTH DAILY       warfarin 3 MG tablet    COUMADIN    180 tablet    Take 3mg by mouth on Mondays and Thursdays. Take 4.5mg by mouth all other days of the week.       * Notice:  This list has 11 medication(s) that are the same as other medications  prescribed for you. Read the directions carefully, and ask your doctor or other care provider to review them with you.

## 2017-06-13 NOTE — PROGRESS NOTES
SUBJECTIVE:                                                      Sohan Rendon is a 67 year old male who has an LVAD due to severe ischemic cardiomyopathy. He has had recurrent embolic strokes, despite careful anticoagulation, with the LVAD believed to be the source. He was hospitalized 9/23 - 10/4/14 for acute ischemic subcortical stroke, superimposed upon previous R PICA and R MCA strokes. During that hospital stay, several discussions were carried out with the patient's family regarding the possibility of LVAD removal and conversion to Hospice. Per my discussion with his attending cardiologist, Dr. Lemons, family are well aware of the patient's terminal condition, but they steadfastly decline enrolling him into Hospice. They have wished to continue LVAD and other cares at home, while ensuring his comfort. Dr. Lemons made a referral for this patient to be seen at home by the [facility] to provide primary care management, though the LVAD/Cardiology clinics will remain in charge of his cardiology care.       Mr. Rendon was seen today in his home along with a daughter and an  for the above issues, as well as for the following health issues:         Heart Failure Follow-up    Patient with end-stage cardiomyopathy, Stage D, [facility] IIIB. LVAD in place. History of recurrent emboli from LVAD despite careful anticoagulation. Patient is not a candidate for device revision or exchange per Cardiology notes.    Symptoms: No recent recurrence of vague left anterior chest pain that can persist for days at a time, and which he and I have previously attributed to muscular strain.    Shortness of breath: Happens at rest infrequently, but not worse.    Lower extremity edema: No more than trace pedal edema at baseline, currently stable.    Chest pain: See above.    Using more pillows than normal: N/A; has adjustable (hospital) bed.    Cough at night: Yes- occasional cough, present for months, believed to be due to  "non-cardiac causes.    Weight: Not checking weight daily; patient unable to weight bear.    Cardiology visits, ER/UC, or hospital admissions since last visit: Not for cardiac disease or symptoms.    Medication side effects: None described to cardiac meds.          Cerebrovascular Follow-up    History of multiple embolic strokes, LVAD source, despite warfarin anticoagulation. Most recent documented stroke September 2014.    Residual symptoms: Dysarthria, dysphagia, left arm and leg weakness. Dysarthria has improved remarkably. Dense left hemiparesis is unimproved. Dysphagia has improved to permit PO feeding of an unrestricted texture diet.  Diagnosed with new-onset seizure disorder 6/2015; see below.    Worsened or new symptoms since last visit: None.    Daily aspirin use: Stopped during 9/2016 hospital stay for gross hematuria and remains on hold.  At Urology recommendation, ASA is on hold until hematuria confirmed resolved.  Warfarin has continued, though inpatient Urology notes suggest a more conservative INR target of 1.8 to 2.3 instead of the prior Cardiology target of 2.5 to 3.0 (indication: history of recurrent stroke associated with LVAD).  I sent messages to Urology and Cardiology to see if these restrictions still apply, and heard back from Dr. Lemons, Cardiology.  Dr. Lemons has advised that we should continue to hold ASA, and continue to aim for INR 1.8 to 2.3, until further notice.          Seizures    Duration: Initially diagnosed during hospitalization of 6/2015. Was noted to have seizure in-house, levetiracetam started.    Description (location/character/radiation): Described as \"focal onset epilepsy\" in Neuro consultation.    Intensity: No witnessed seizure activity since return home from 6/2015 admission.    History (similar episodes/previous evaluation): Neuro consultation during hospital stay 6/2015. No neuro follow-up scheduled.    Precipitating or alleviating factors: History of multiple embolic " CVA due to LVAD thrombosis, most recently 9/2014.    Therapies tried and outcome: Levetiracetam 500 mg BID started 6/2015, no witnessed seizure activity since Rx started.                                                                                    Amount of exercise or physical activity: None; essentially bed bound. Family says he can stand for brief periods of time if supported, cannot walk. There is a Jay lift at home used for transfer to chair or wheelchair. Family requested replacement manual wheelchair that will permit positioning of lower extremities given right leg fracture which I ordered 3/2016, and which has finally arrived. Current hospital bed controls have failed, new Rx for hospital bed sent to [facility], [place] on 8/19/16, no word yet on delivery per daughter.    Problems taking medications regularly: Has some mild dysphagia, but is not missing doses. Meds administered by daughters.    Medication side effects: Lethargic occasionally following oxycodone doses, though has been using very little recently.    Diet: regular (no restrictions). Daughters feed him. No witnessed choking or aspiration.      Hyperlipidemia Follow-Up      Rate your low fat/cholesterol diet?: Not carefully monitoring fat, but meals prepared by daughters are generally low fat.    Taking statin?  Yes, atorvastatin.  Reports no myalgia.    Other lipid medications/supplements?:  None.     Chronic Kidney Disease       Current NSAID use?  None.      Problem list and histories reviewed & adjusted, as indicated.  Additional history: as documented    BP Readings from Last 3 Encounters:   06/13/17 96/58   05/18/17 90/66   05/15/17 101/78    Wt Readings from Last 3 Encounters:   05/15/17 190 lb 4.1 oz (86.3 kg)   05/02/17 180 lb (81.6 kg)   04/29/17 180 lb (81.6 kg)                  Labs reviewed in EPIC    Reviewed and updated as needed this visit by clinical staff       Reviewed and updated as needed this visit by Provider        ROS:  Obtained both from patient's daughters, as well as the patient himself via .   CONSTITUTIONAL: NEGATIVE for chills, fever, sweats.   EYES: Reports ongoing itching and pain from OD, but no change in acuity from that eye. OS without symptoms.  Daughter has not rescheduled missed Ophthalmology appointment.  ENT/MOUTH: NEGATIVE for nasal congestion, sinus pressure. No recurrence of epistaxis since last visit.  RESP: NEGATIVE for dyspnea, wheeze, or respiratory chest pain. Cough has been mild/minimal since last visit.  CV: NEGATIVE for orthopnea, or worsened lower extremity edema.   GI: See above.  Constipation is now generally well-controlled on Senna 1 or 2 per day; has had daily BM.  : Mcarthur was removed by Urology 5/2017.  NEGATIVE for hematuria, dysuria, difficulties urinating, or flank pain.   MUSCULOSKELETAL: NEGATIVE for change or worsening in right hip pain.  Neck and head pain are unchanged.  Currently using only rare oxycodone.    INTEG: No rashes.  NEURO: NEGATIVE for new or worsened focal numbness or weakness or syncope.    PSYCHIATRIC: NEGATIVE for changes in mild baseline anxiety, no panic, no recent change in mood.      OBJECTIVE:                                                    BP 96/58  Pulse 60  Resp 16  SpO2 100% on room air.  There is no height or weight on file to calculate BMI.    GENERAL: Appears clean and comfortable, propped upright in bed.  EYES: Eyes grossly normal to inspection, no conjunctivitis or discharge.  HENT: Nares patent by sniff, no discharge.    NECK: Trachea midline, no stridor.    RESP: No accessory muscle use. No cough during this visit. Symmetrical breath sounds. Lungs clear throughout on inspiration and expiration. Expiration not prolonged, no wheeze.  CV: Regular rate and rhythm, non-tachycardic. Prominent LVAD noise, which sounds like someone is running a vacuum  in the near background, makes exam difficult. S1 S2 softly audible, no murmur or  "extra sound. No lower extremity edema. Extremities slightly cool x4.  ABDOMEN: LVAD entry site not undressed for exam. Protuberant, though soft, non-tender, no guarding over all quadrants. Liver and spleen not palpable, no masses palpable. Bowel sounds positive.  : External genitalia normal.  MS: No bony deformities noted, including at fractured right femur. No pain reproduced by passive ROM of either hip or leg. No red or inflamed joints.  .  SKIN: Warm and dry, no rashes where skin visible.    NEURO: Alert and conversant, oriented and appropriate in conversation per , though some very mild dysarthria present. Mild left facial droop noted, cranial nerves II - XII otherwise grossly intact.  Dense left upper and lower extremity paresis persists.  PSYCH: Calm, conversant. Language barrier prevents good exam, though affect appears normal.       ASSESSMENT/PLAN:                                                    (K81.9) Acalculous cholecystitis due to (K83.1) Biliary tract obstruction    Comment: I do not find etiology of obstruction in hospital notes.  Biliary stent placed 5/9/17 with resolution of pain and associated symptoms.  Aside from the sensation of abdominal \"bloating\", which is not new, patient feeling generally well.  PO diet going OK, without emesis.  Plan: Will order one more comprehensive metabolic panel.  No additional GI or surgical follow-up needed or scheduled.     (R33.9) Urinary retention due to (N40.1) Benign non-nodular prostatic hyperplasia with lower urinary tract symptoms  Comment: Patient was unable to void in-hospital, so Mcarthur was placed, and subsequently removed 5/2017 at outpatient Urology visit.  Urology has started finasteride and tamsulosin, both of which continue.  Plan: Has Urology follow-up scheduled next week.  Finasteride and tamsulosin continue at least for now.     (D62) Acute posthemorrhagic anemia  Comment: Has element of (D50.0) Chronic anemia due to iron " "dieficiency, baseline Hgb has been in the 11's.  GI blood loss during 5/2017 hospital stay resulted in doris Hgb of 8.4 and discharge Hgb of 8.7.  He is having no melena or other blood loss.  He remains on warfarin, target INR 1.8 to 2.3 per Cardiology (lower INR selected initially due to gross hematuria, though GI blood loss adds another indication for a lower target INR).  Plan: CBC with platelets, serum iron ordered.  Iron and ascorbic acid were stopped during 5/2017 hospital stay; may need to resume if serum Fe and/or Hgb low.    (H57.8) Irritation of left eye  Comment: Patient has history of (H26.9) Cataract, right and (Z96.1) Pseudophakia right eye, S/P cataract surgery 11/2015 by Dr. Cosme. Has reported that OD \"itches\" and sometimes burns beginning several months after the operation, and reports recurrence of these symptoms today without change in OD visual acuity. I made Ophthalmology referral last visit, which patient missed during a hospital stay.  Daughter has not yet rescheduled that appointment.  Plan: Await Ophthalmology evaluation.  Remains on Patanol gtts.      (J30.9) Allergic rhinitis, unspecified allergic rhinitis type  Comment: Persistent symptoms.  Plan: Had to stop fluticasone (FLONASE) 50 MCG/ACT nasal spray due to epistaxis.  Continue loratadine, observe.      (I50.22) CHF (congestive heart failure), NYHA class IV, (Z95.811) LVAD (left ventricular assist device) present  Comment: Patient has recurrent embolic events from his LVAD, including recurrent stroke, despite careful anticoagulation. His LVAD cannot be exchanged, per my conversation with Dr. Lemons, Cardiology. Heart failure overall appears to be well-compensated today. VAD interrogated during recent cardiology visit and hospital stay, no abnormalities found.  No medication changes noted since my last visit with him.  Plan: Continue current care, though focusing on palliative care. He continues on warfarin as directed by VAD Clinic. " "Continue to defer management of rhythm, hemodynamics, anticoagulation to the Cardiology/LVAD Clinic.  Previous INR target was 2.5 to 3.0 per Cardiology, but a lower target INR of 1.8 to 2.3 is currently being used due to recurrent gross hematuria.  ASA reamains on hold at Cardiology advice due to hematuria.  These INR and ASA modifications are to continue until further notice, per message from Cardiology.  Next Cardiology follow-up 5/11/17.       (G47.01) Insomnia due to medical condition    Comment: Episodic sleep-initiation difficulties, not severe.  He is using acetaminophen/diphenhydramine and melatonin OTC with some relief.  Plan: Continue \"Tylenol PM\" (acetaminophen/diphenhydramine) and melatonin 3 mg at HS PRN.      (R56.9) Focal onset epilepsy  Comment: Seizure at home 6/2015 prompted hospitalization, patient now on levetiracetam. No additional seizure activity noted since Rx started.   Plan: Continue levetiracetam 500 mg Q12H, observe for seizure recurrence.       (S72.001K) Fracture of femoral neck, right, closed, subsequent encounter  Comment: S/P hospitalization 1/2015. Family declined surgery, pain control now the primary goal. Patient seen most recently in Sports Med clinic on 4/7/15 by Dr. Taylor. No specific therapy recommended, and no orthopedics follow-up necessary.  Seen in ED 2/2016 due to recurrent hip pain after PT, imaging showed no change, symptoms resolved without change in Rx.  Plan: Patient and daughter report generally good pain control on oxycodone IR 5 mg Q6H PRN, which he is now using rarely. PT was helpful at improving patient's tolerance of PROM of hip, and reducing pain and muscle cramping, but is completed now.  Could resume PT if needed for pain, immobility in future.      (I63.411) Cerebrovascular accident 9/2014 due to embolism of R MCA  Comment: Residual deficits include dysarthria, which has improved remarkably, dysphagia (see below), dense left hemiparesis, and new-onset " epilepsy as of 6/2015. Given that source of embolic strokes is from LVAD, and that embolism cannot be prevented despite anticoagulation, future events are possible or likely. He had what appears to have been a TIA during 4/2016 hospital stay when warfarin and ASA were held due to gross hematuria, but subsequent neuro workup did not reveal new stroke.  Neuro status has been stable since that time.  Plan: Warfarin anticoagulation continues per Cardiology, ASA held due to hematuria, details above.  Observe for new events.      (R13.10) Dysphagia  Comment: Had bedside swallow evaluation from Speech Therapy 12/2014. He had timbo aspiration only to thin liquids. Per daughters, although they have to feed him due to weakness from stroke, he appears to swallow without choking or aspiration episodes.  Plan: Continue speech therapy recommendations: patient should be sitting straight up when eating, take small bolus sizes, eat slowly, not talk during eating. Continue PO diet.    (E78.5) Hyperlipidemia  Comment: Remains on atorvastatin.  Has not had lipid profile in more than a year.  Plan:  Lipid profile ordered.    (N18.3) Chronic kidney disease  Comment: Etiology unclear; CHF may be the primary cause.  Is on no nephrotoxins.  Plan: Follow GFR routinely.        FUTURE LABS:       - Schedule a non-fasting blood draw within next couple of weeks, details above.    FUTURE APPOINTMENTS:       - Follow-up visit scheduled for 7/18/17 at 1400.    Face to face time was 40 minutes, at least 50% of which was spent in counseling regarding management of anemia, heart failure, renal disease, and hyperlipidemia.      Thomas Dill MD  [facility]

## 2017-06-14 PROBLEM — D50.0 IRON DEFICIENCY ANEMIA DUE TO CHRONIC BLOOD LOSS: Status: ACTIVE | Noted: 2017-06-14

## 2017-06-14 NOTE — PATIENT INSTRUCTIONS
1.  I ordered multiple labs today.  You'll receive a call to schedule the blood draw at home, then I'll let you know the results.    2.  Until we get the results back, please continue not to give iron or vitamin C.  The labs will tell us if we need to resume these.    3.  You don't need a referral from me to reschedule the Ophthalmology appointment that you had to miss during the May hospital stay.  Please go ahead and call to reschedule that visit.    4.  We made no medication changes today.    5.  I'll see you next on Tuesday, July 18th at about 2 PM.

## 2017-06-15 ENCOUNTER — ANTICOAGULATION THERAPY VISIT (OUTPATIENT)
Dept: ANTICOAGULATION | Facility: CLINIC | Age: 67
End: 2017-06-15

## 2017-06-15 DIAGNOSIS — Z95.811 LVAD (LEFT VENTRICULAR ASSIST DEVICE) PRESENT (H): ICD-10-CM

## 2017-06-15 DIAGNOSIS — Z79.01 LONG-TERM (CURRENT) USE OF ANTICOAGULANTS: ICD-10-CM

## 2017-06-15 LAB — INR PPP: 1.7

## 2017-06-15 NOTE — PROGRESS NOTES
ANTICOAGULATION FOLLOW-UP CLINIC VISIT    Patient Name:  Sohan Rendon  Date:  6/15/2017  Contact Type:  Telephone    SUBJECTIVE:     Patient Findings     Positives No Problem Findings           OBJECTIVE    INR   Date Value Ref Range Status   06/15/2017 1.7  Final     Chromogenic Factor 10   Date Value Ref Range Status   08/10/2014 24 (L) 70 - 130 % Final     Comment:     Therapeutic Range:  A Chromogenic Factor 10 level of approximately 20-40%   inversely correlates with an INR of 2-3 for patients receiving Warfarin.   Chromogenic Factor 10 levels below 20% indicate an INR greater than 3 and   levels above 40% indicate an INR less than 2.       Factor 2 Assay   Date Value Ref Range Status   07/21/2014 25 (L) 60 - 140 % Final     Comment:     Analyte Specific Reagents (ASRs) are used in many laboratory tests necessary   for   standard medical care and generally do not require FDA approval.  This test   was   developed and its performance characteristics determined by [facility].  It has not been cleared or approved by   the [place] Food and Drug Administration.         ASSESSMENT / PLAN  INR assessment THER    Recheck INR In: 4 DAYS    INR Location Clinic      Anticoagulation Summary as of 6/15/2017     INR goal    Prior goal 1.8-2.5   Today's INR 1.7!   Maintenance plan No maintenance plan   Full instructions 6/15: 6 mg; 6/16: 6 mg; 6/17: 6 mg; 6/18: 4.5 mg   Plan last modified Blanca Bah RN (5/30/2017)   Next INR check 6/19/2017   Priority INR   Target end date Indefinite    Indications   LVAD (left ventricular assist device) present [Z95.811]  Long-term (current) use of anticoagulants [Z79.01] [Z79.01]         Anticoagulation Episode Summary     INR check location     Preferred lab     Send INR reminders to [facility]    Comments Goal Range is 1.8-2.3  [facility] comes out to draw INR  Daughter Almaz Ph [phone]      Anticoagulation Care  Providers     Provider Role Specialty Phone number    Evon Lemons MD Responsible Cardiology [phone]            See the Encounter Report to view Anticoagulation Flowsheet and Dosing Calendar (Go to Encounters tab in chart review, and find the Anticoagulation Therapy Visit)    Spoke with Martina MCKINNON.    Blanca Bah, RN

## 2017-06-15 NOTE — MR AVS SNAPSHOT
Sohan [facility]   6/15/2017   Anticoagulation Therapy Visit    Description:  67 year old male   Provider:  Blanca Bah, RN   Department:  [facility]           INR as of 6/15/2017     Today's INR 1.7!      Anticoagulation Summary as of 6/15/2017     INR goal    Prior goal 1.8-2.5   Today's INR 1.7!   Full instructions 6/15: 6 mg; 6/16: 6 mg; 6/17: 6 mg; 6/18: 4.5 mg   Next INR check 6/19/2017    Indications   LVAD (left ventricular assist device) present [Z95.811]  Long-term (current) use of anticoagulants [Z79.01] [Z79.01]         June 2017 Details    Sun Mon Tue Wed Thu Fri Sat         1               2               3                 4               5               6               7               8               9               10                 11               12               13               14               15      6 mg   See details      16      6 mg         17      6 mg           18      4.5 mg         19            20               21               22               23               24                 25               26               27               28               29               30                 Date Details   06/15 This INR check       Date of next INR:  6/19/2017         How to take your warfarin dose     To take:  4.5 mg Take 1.5 of the 3 mg tablets.    To take:  6 mg Take 2 of the 3 mg tablets.

## 2017-06-19 ENCOUNTER — ANTICOAGULATION THERAPY VISIT (OUTPATIENT)
Dept: ANTICOAGULATION | Facility: CLINIC | Age: 67
End: 2017-06-19

## 2017-06-19 ENCOUNTER — PRE VISIT (OUTPATIENT)
Dept: UROLOGY | Facility: CLINIC | Age: 67
End: 2017-06-19

## 2017-06-19 DIAGNOSIS — Z95.811 LVAD (LEFT VENTRICULAR ASSIST DEVICE) PRESENT (H): ICD-10-CM

## 2017-06-19 DIAGNOSIS — Z79.01 LONG-TERM (CURRENT) USE OF ANTICOAGULANTS: ICD-10-CM

## 2017-06-19 LAB — INR POINT OF CARE: 2.1 (ref 0.86–1.14)

## 2017-06-19 NOTE — MR AVS SNAPSHOT
Sohan [facility]   6/19/2017   Anticoagulation Therapy Visit    Description:  67 year old male   Provider:  Amy Godinez, RN   Department:  [facility]           INR as of 6/19/2017     Today's INR 2.1      Anticoagulation Summary as of 6/19/2017     INR goal    Prior goal 1.8-2.5   Today's INR 2.1   Full instructions 6/19: 6 mg; 6/20: 6 mg; 6/21: 4.5 mg   Next INR check 6/22/2017    Indications   LVAD (left ventricular assist device) present [Z95.811]  Long-term (current) use of anticoagulants [Z79.01] [Z79.01]         June 2017 Details    Sun Mon Tue Wed Thu Fri Sat         1               2               3                 4               5               6               7               8               9               10                 11               12               13               14               15               16               17                 18               19      6 mg   See details      20      6 mg         21      4.5 mg         22            23               24                 25               26               27               28               29               30                 Date Details   06/19 This INR check       Date of next INR:  6/22/2017         How to take your warfarin dose     To take:  4.5 mg Take 1.5 of the 3 mg tablets.    To take:  6 mg Take 2 of the 3 mg tablets.

## 2017-06-22 ENCOUNTER — ANTICOAGULATION THERAPY VISIT (OUTPATIENT)
Dept: ANTICOAGULATION | Facility: CLINIC | Age: 67
End: 2017-06-22

## 2017-06-22 DIAGNOSIS — Z95.811 LVAD (LEFT VENTRICULAR ASSIST DEVICE) PRESENT (H): ICD-10-CM

## 2017-06-22 DIAGNOSIS — D50.0 IRON DEFICIENCY ANEMIA DUE TO CHRONIC BLOOD LOSS: ICD-10-CM

## 2017-06-22 DIAGNOSIS — N18.3 CKD (CHRONIC KIDNEY DISEASE), STAGE 3 (MODERATE): Primary | ICD-10-CM

## 2017-06-22 DIAGNOSIS — E78.5 HYPERLIPIDEMIA, UNSPECIFIED HYPERLIPIDEMIA TYPE: ICD-10-CM

## 2017-06-22 DIAGNOSIS — I50.22 CHF (CONGESTIVE HEART FAILURE), NYHA CLASS IV, CHRONIC, SYSTOLIC (H): ICD-10-CM

## 2017-06-22 DIAGNOSIS — Z79.01 LONG-TERM (CURRENT) USE OF ANTICOAGULANTS: ICD-10-CM

## 2017-06-22 LAB
ERYTHROCYTE [DISTWIDTH] IN BLOOD BY AUTOMATED COUNT: 18.7 % (ref 10–15)
HCT VFR BLD AUTO: 33.7 % (ref 40–53)
HGB BLD-MCNC: 10.6 G/DL (ref 13.3–17.7)
INR PPP: 2.1
MCH RBC QN AUTO: 30.4 PG (ref 26.5–33)
MCHC RBC AUTO-ENTMCNC: 31.5 G/DL (ref 31.5–36.5)
MCV RBC AUTO: 97 FL (ref 78–100)
PLATELET # BLD AUTO: 187 10E9/L (ref 150–450)
RBC # BLD AUTO: 3.49 10E12/L (ref 4.4–5.9)
WBC # BLD AUTO: 5.4 10E9/L (ref 4–11)

## 2017-06-22 PROCEDURE — 83540 ASSAY OF IRON: CPT

## 2017-06-22 PROCEDURE — 80053 COMPREHEN METABOLIC PANEL: CPT

## 2017-06-22 PROCEDURE — 85027 COMPLETE CBC AUTOMATED: CPT

## 2017-06-22 PROCEDURE — 80061 LIPID PANEL: CPT

## 2017-06-22 PROCEDURE — 36415 COLL VENOUS BLD VENIPUNCTURE: CPT

## 2017-06-22 NOTE — MR AVS SNAPSHOT
Sohan [facility]   6/22/2017   Anticoagulation Therapy Visit    Description:  67 year old male   Provider:  Sarah Martinez, RN   [facility]           INR as of 6/22/2017     Today's INR 2.10      Anticoagulation Summary as of 6/22/2017     INR goal    Prior goal 1.8-2.5   Today's INR 2.10   Full instructions 6/22: 6 mg; 6/23: 4.5 mg; 6/24: 6 mg; 6/25: 6 mg   Next INR check 6/26/2017    Indications   LVAD (left ventricular assist device) present [Z95.811]  Long-term (current) use of anticoagulants [Z79.01] [Z79.01]         June 2017 Details    Sun Mon Tue Wed Thu Fri Sat         1               2               3                 4               5               6               7               8               9               10                 11               12               13               14               15               16               17                 18               19               20               21               22      6 mg   See details      23      4.5 mg         24      6 mg           25      6 mg         26            27               28               29               30                 Date Details   06/22 This INR check       Date of next INR:  6/26/2017         How to take your warfarin dose     To take:  4.5 mg Take 1.5 of the 3 mg tablets.    To take:  6 mg Take 2 of the 3 mg tablets.

## 2017-06-22 NOTE — PROGRESS NOTES
ANTICOAGULATION FOLLOW-UP CLINIC VISIT    Patient Name:  Sohan Rendon  Date:  6/22/2017  Contact Type:  Telephone    SUBJECTIVE:     Patient Findings     Positives No Problem Findings           OBJECTIVE    INR   Date Value Ref Range Status   06/22/2017 2.10  Final     Comment:     [facility]      Chromogenic Factor 10   Date Value Ref Range Status   08/10/2014 24 (L) 70 - 130 % Final     Comment:     Therapeutic Range:  A Chromogenic Factor 10 level of approximately 20-40%   inversely correlates with an INR of 2-3 for patients receiving Warfarin.   Chromogenic Factor 10 levels below 20% indicate an INR greater than 3 and   levels above 40% indicate an INR less than 2.       Factor 2 Assay   Date Value Ref Range Status   07/21/2014 25 (L) 60 - 140 % Final     Comment:     Analyte Specific Reagents (ASRs) are used in many laboratory tests necessary   for   standard medical care and generally do not require FDA approval.  This test   was   developed and its performance characteristics determined by [facility].  It has not been cleared or approved by   the US Food and Drug Administration.         ASSESSMENT / PLAN  INR assessment THER    Recheck INR In: 4 DAYS    INR Location [facility]      Anticoagulation Summary as of 6/22/2017     INR goal    Prior goal 1.8-2.5   Today's INR 2.10   Maintenance plan No maintenance plan   Full instructions 6/22: 6 mg; 6/23: 4.5 mg; 6/24: 6 mg; 6/25: 6 mg   Plan last modified Blanca Bah RN (5/30/2017)   Next INR check 6/26/2017   Priority INR   Target end date Indefinite    Indications   LVAD (left ventricular assist device) present [Z95.811]  Long-term (current) use of anticoagulants [Z79.01] [Z79.01]         Anticoagulation Episode Summary     INR check location     Preferred lab     Send INR reminders to [facility]    Comments Goal Range is 1.8-2.3  [facility] comes out to draw INR  Daughter Almaz Ph [phone]      Anticoagulation Care Providers     Provider Role Specialty Phone number    Evon Lemons MD Responsible Cardiology [phone]            See the Encounter Report to view Anticoagulation Flowsheet and Dosing Calendar (Go to Encounters tab in chart review, and find the Anticoagulation Therapy Visit)    Spoke with JONNIE Cowart. Gave them new warfarin recommendation.  No changes in health, medication, or diet. No missed doses, no falls. No signs or symptoms of bleed or clotting.     Sarah Martinez, RN

## 2017-06-23 ENCOUNTER — TELEPHONE (OUTPATIENT)
Dept: FAMILY MEDICINE | Facility: CLINIC | Age: 67
End: 2017-06-23

## 2017-06-23 DIAGNOSIS — R31.0 HEMATURIA, GROSS: Primary | ICD-10-CM

## 2017-06-23 LAB
ALBUMIN SERPL-MCNC: 3.1 G/DL (ref 3.4–5)
ALP SERPL-CCNC: 107 U/L (ref 40–150)
ALT SERPL W P-5'-P-CCNC: 16 U/L (ref 0–70)
ANION GAP SERPL CALCULATED.3IONS-SCNC: 11 MMOL/L (ref 3–14)
AST SERPL W P-5'-P-CCNC: 65 U/L (ref 0–45)
BILIRUB SERPL-MCNC: 0.8 MG/DL (ref 0.2–1.3)
BUN SERPL-MCNC: 26 MG/DL (ref 7–30)
CALCIUM SERPL-MCNC: 8.7 MG/DL (ref 8.5–10.1)
CHLORIDE SERPL-SCNC: 112 MMOL/L (ref 94–109)
CHOLEST SERPL-MCNC: 138 MG/DL
CO2 SERPL-SCNC: 19 MMOL/L (ref 20–32)
CREAT SERPL-MCNC: 2.14 MG/DL (ref 0.66–1.25)
GFR SERPL CREATININE-BSD FRML MDRD: 31 ML/MIN/1.7M2
GLUCOSE SERPL-MCNC: 80 MG/DL (ref 70–99)
HDLC SERPL-MCNC: 29 MG/DL
IRON SERPL-MCNC: 44 UG/DL (ref 35–180)
LDLC SERPL CALC-MCNC: 80 MG/DL
NONHDLC SERPL-MCNC: 109 MG/DL
POTASSIUM SERPL-SCNC: ABNORMAL MMOL/L (ref 3.4–5.3)
PROT SERPL-MCNC: 6.9 G/DL (ref 6.8–8.8)
SODIUM SERPL-SCNC: 142 MMOL/L (ref 133–144)
TRIGL SERPL-MCNC: 143 MG/DL

## 2017-06-23 NOTE — TELEPHONE ENCOUNTER
Dr. Dill ordered a BMP for next week, approximately Wed, 6/28.  I spoke with ROHAN Cowart RN, and she is seeing him on Mon, 6/26.   Dr. Dill - would it be too early to re-check BMP next Monday?    Ekaterina Nielson RN

## 2017-06-23 NOTE — TELEPHONE ENCOUNTER
Labs on Monday would be fine, though we may need another set in a week or two if GFR change is minimal.  Thanks.

## 2017-06-23 NOTE — TELEPHONE ENCOUNTER
"The pt's daughter stated that his urine has been brown all week; \"like wood\".   She said he is alert and feeling fine and she is not worried about him except for the color of his urine.    Almaz also said the pt has been taking 800 mg of magnesium per day. She asked if Dr. Dill thinks he needs a magnesium level checked.    Ekaterina Nielson RN          "

## 2017-06-23 NOTE — TELEPHONE ENCOUNTER
The discolored urine is probably recurrence of hematuria.  I'll add UA (but not culture, please) to labs to be drawn Monday.  Since I'll be out of town next week, please know that the patient has had recurrent hematuria, severe enough that his target INR is lower than usual at 1.8 to 2.3, and ASA has been stopped.  If he has significant hematuria, he can be referred for re-evaluation by his established Urologist.    No need to draw magnesium.

## 2017-06-26 ENCOUNTER — ANTICOAGULATION THERAPY VISIT (OUTPATIENT)
Dept: ANTICOAGULATION | Facility: CLINIC | Age: 67
End: 2017-06-26

## 2017-06-26 ENCOUNTER — TELEPHONE (OUTPATIENT)
Dept: FAMILY MEDICINE | Facility: CLINIC | Age: 67
End: 2017-06-26

## 2017-06-26 DIAGNOSIS — Z95.811 LVAD (LEFT VENTRICULAR ASSIST DEVICE) PRESENT (H): ICD-10-CM

## 2017-06-26 DIAGNOSIS — R31.0 HEMATURIA, GROSS: ICD-10-CM

## 2017-06-26 DIAGNOSIS — Z79.01 LONG-TERM (CURRENT) USE OF ANTICOAGULANTS: ICD-10-CM

## 2017-06-26 LAB
ALBUMIN UR-MCNC: NEGATIVE MG/DL
APPEARANCE UR: CLEAR
BILIRUB UR QL STRIP: NEGATIVE
COLOR UR AUTO: ABNORMAL
GLUCOSE UR STRIP-MCNC: NEGATIVE MG/DL
HGB UR QL STRIP: ABNORMAL
INR PPP: 2.6
KETONES UR STRIP-MCNC: NEGATIVE MG/DL
LEUKOCYTE ESTERASE UR QL STRIP: ABNORMAL
MUCOUS THREADS #/AREA URNS LPF: PRESENT /LPF
NITRATE UR QL: NEGATIVE
PH UR STRIP: 6 PH (ref 5–7)
RBC #/AREA URNS AUTO: 1 /HPF (ref 0–2)
SP GR UR STRIP: 1 (ref 1–1.03)
SQUAMOUS #/AREA URNS AUTO: <1 /HPF (ref 0–1)
TRANS CELLS #/AREA URNS HPF: <1 /HPF (ref 0–1)
URN SPEC COLLECT METH UR: ABNORMAL
UROBILINOGEN UR STRIP-MCNC: NORMAL MG/DL (ref 0–2)
WBC #/AREA URNS AUTO: 3 /HPF (ref 0–2)

## 2017-06-26 NOTE — MR AVS SNAPSHOT
[facility]   6/26/2017   Anticoagulation Therapy Visit    Description:  67 year old male   Provider:  Sarah Martinez, RN   Department:  [facility]           INR as of 6/26/2017     Today's INR 2.60!      Anticoagulation Summary as of 6/26/2017     INR goal    Prior goal 1.8-2.5   Today's INR 2.60!   Full instructions 6/26: 4.5 mg; 6/27: 6 mg; 6/28: 4.5 mg; 6/29: 6 mg; 6/30: 4.5 mg; 7/1: 6 mg; 7/2: 6 mg   Next INR check 7/3/2017    Indications   LVAD (left ventricular assist device) present [Z95.811]  Long-term (current) use of anticoagulants [Z79.01] [Z79.01]         June 2017 Details    Sun Mon Tue Wed Thu Fri Sat         1               2               3                 4               5               6               7               8               9               10                 11               12               13               14               15               16               17                 18               19               20               21               22               23               24                 25               26      4.5 mg   See details      27      6 mg         28      4.5 mg         29      6 mg         30      4.5 mg           Date Details   06/26 This INR check               How to take your warfarin dose     To take:  4.5 mg Take 1.5 of the 3 mg tablets.    To take:  6 mg Take 2 of the 3 mg tablets.           July 2017 Details    Sun Mon Tue Wed Thu Fri Sat           1      6 mg           2      6 mg         3            4               5               6               7               8                 9               10               11               12               13               14               15                 16               17               18               19               20               21               22                 23               24               25               26               27               28               29                 30                31                     Date Details   No additional details    Date of next INR:  7/3/2017         How to take your warfarin dose     To take:  6 mg Take 2 of the 3 mg tablets.

## 2017-06-26 NOTE — TELEPHONE ENCOUNTER
Sadia called from [facility] regarding pt. They received orders to get BMP and UA and was not able to get either. They had left the UA cup for daughter to get and drop off at lab when able and BMP they were unable to get. Sadia said that we should send out client svs to collect as they were not able to.     FWD to RN pool to contact [facility].

## 2017-06-26 NOTE — TELEPHONE ENCOUNTER
Spoke with SHAWN Mccullough. She said pt's dtr will bring the urine to a [facility] lab.   I emailed [facility] to see if a mobile phlebotomist can get the BMP that Dr. Dill wanted drawn mid-week.  Sadia said if we can't get a phlebotomist to go out there, [facility] could try to send another nurse.    Sadia said the pt was doing well today and his urine was getting clearer.    Ekaterina Nielson RN

## 2017-06-26 NOTE — PROGRESS NOTES
ANTICOAGULATION FOLLOW-UP CLINIC VISIT    Patient Name:  Sohan Rendon  Date:  6/26/2017  Contact Type:  Telephone    SUBJECTIVE:     Patient Findings     Positives No Problem Findings           OBJECTIVE    INR   Date Value Ref Range Status   06/26/2017 2.60  Final     Comment:     [facility]      Chromogenic Factor 10   Date Value Ref Range Status   08/10/2014 24 (L) 70 - 130 % Final     Comment:     Therapeutic Range:  A Chromogenic Factor 10 level of approximately 20-40%   inversely correlates with an INR of 2-3 for patients receiving Warfarin.   Chromogenic Factor 10 levels below 20% indicate an INR greater than 3 and   levels above 40% indicate an INR less than 2.       Factor 2 Assay   Date Value Ref Range Status   07/21/2014 25 (L) 60 - 140 % Final     Comment:     Analyte Specific Reagents (ASRs) are used in many laboratory tests necessary   for   standard medical care and generally do not require FDA approval.  This test   was   developed and its performance characteristics determined by [facility].  It has not been cleared or approved by   the US Food and Drug Administration.         ASSESSMENT / PLAN  INR assessment SUPRA    Recheck INR In: 1 WEEK    INR Location [facility]      Anticoagulation Summary as of 6/26/2017     INR goal    Prior goal 1.8-2.5   Today's INR 2.60!   Maintenance plan No maintenance plan   Full instructions 6/26: 4.5 mg; 6/27: 6 mg; 6/28: 4.5 mg; 6/29: 6 mg; 6/30: 4.5 mg; 7/1: 6 mg; 7/2: 6 mg   Plan last modified Blanca Bah RN (5/30/2017)   Next INR check 7/3/2017   Priority INR   Target end date Indefinite    Indications   LVAD (left ventricular assist device) present [Z95.811]  Long-term (current) use of anticoagulants [Z79.01] [Z79.01]         Anticoagulation Episode Summary     INR check location     Preferred lab     [facility]    Comments Goal Range is 1.8-2.3  [facility] comes out to  draw INR  Daughter Almaz  [phone]      Anticoagulation Care Providers     Provider Role Specialty Phone number    Evon Lemons MD Responsible Cardiology [phone]            See the Encounter Report to view Anticoagulation Flowsheet and Dosing Calendar (Go to Encounters tab in chart review, and find the Anticoagulation Therapy Visit)    Spoke with JONNIE Mack. Gave them new warfarin recommendation.  No changes in health, medication, or diet. No missed doses, no falls. No signs or symptoms of bleed or clotting.     Initiate maintenance dose of 4.5mg MWF and 6mg ROW check back in a week    Sarah Martinez RN     On 6/28/17   INR was 2.68   Tested at [facility]   Did not contact patient or Home[facility] RN  No change in warfarin dose needed.  Next INR with Home care is scheduled for 7/3/17.

## 2017-06-27 ENCOUNTER — OFFICE VISIT (OUTPATIENT)
Dept: OPHTHALMOLOGY | Facility: CLINIC | Age: 67
End: 2017-06-27
Attending: OPHTHALMOLOGY
Payer: COMMERCIAL

## 2017-06-27 DIAGNOSIS — H53.469 HOMONYMOUS HEMIANOPIA, UNSPECIFIED LATERALITY: ICD-10-CM

## 2017-06-27 DIAGNOSIS — H52.4 PRESBYOPIA: ICD-10-CM

## 2017-06-27 DIAGNOSIS — H25.9 AGE-RELATED CATARACT OF LEFT EYE, UNSPECIFIED AGE-RELATED CATARACT TYPE: ICD-10-CM

## 2017-06-27 DIAGNOSIS — Z96.1 PSEUDOPHAKIA, RIGHT EYE: ICD-10-CM

## 2017-06-27 DIAGNOSIS — H10.13 ALLERGIC CONJUNCTIVITIS, BILATERAL: Primary | ICD-10-CM

## 2017-06-27 PROCEDURE — 99215 OFFICE O/P EST HI 40 MIN: CPT | Mod: ZF

## 2017-06-27 ASSESSMENT — REFRACTION_MANIFEST
OD_CYLINDER: SPHERE
OD_AXIS: 100
OS_SPHERE: +0.50
OS_AXIS: 026
OD_SPHERE: -2.00
OS_CYLINDER: +0.25
OD_CYLINDER: SPHERE
OD_SPHERE: -2.00
OD_CYLINDER: +0.50
OD_SPHERE: -1.00
OS_ADD: +2.50
OS_CYLINDER: SPHERE
OD_ADD: +2.50
OS_CYLINDER: SPHERE
OS_SPHERE: -1.50
OS_SPHERE: +0.50

## 2017-06-27 ASSESSMENT — VISUAL ACUITY
OS_SC: J5
METHOD: SNELLEN - LINEAR
OD_SC: 20/40
OD_SC: J1
OS_SC: 20/40-2

## 2017-06-27 ASSESSMENT — TONOMETRY
IOP_METHOD: ICARE
OD_IOP_MMHG: 10
OS_IOP_MMHG: 10

## 2017-06-27 ASSESSMENT — CUP TO DISC RATIO
OS_RATIO: 0.3
OD_RATIO: 0.3

## 2017-06-27 ASSESSMENT — EXTERNAL EXAM - RIGHT EYE: OD_EXAM: NORMAL

## 2017-06-27 ASSESSMENT — EXTERNAL EXAM - LEFT EYE: OS_EXAM: NORMAL

## 2017-06-27 ASSESSMENT — SLIT LAMP EXAM - LIDS
COMMENTS: NORMAL
COMMENTS: NORMAL

## 2017-06-27 NOTE — MR AVS SNAPSHOT
After Visit Summary   6/27/2017    Sohan Rendon    MRN: [id number]           Patient Information     Date Of Birth          1950        Visit Information        Provider Department      6/27/2017 9:15 AM Danuta Sharp; [facility]        Today's Diagnoses     Allergic conjunctivitis, bilateral - Both Eyes    -  1    Age-related cataract of left eye, unspecified age-related cataract type        Pseudophakia, right eye        Homonymous hemianopia, unspecified laterality        Presbyopia          Care Instructions    -Continue using ketotifen 1 drop both eyes 2 times daily for itchy eyes  -A refill has been sent to the [facility] pharmacy on [address]  -A glasses prescription has been provided for you to take to any optical  -You should follow-up in 1 year or sooner if there are any new issues          Follow-ups after your visit        Follow-up notes from your care team     Return in about 1 year (around 6/27/2018) for sooner if new issues.      Your next 10 appointments already scheduled     Jul 18, 2017  2:00 PM CDT   Return Visit with Thomas Dill MD   [facility] ([facility])    [address]  [address] [place] [address]   [phone]            Aug 09, 2017  9:30 AM CDT   Lab with  LAB   [facility])    [address] [place] [address]  1st Floor  [facility] [address]   [phone]            Aug 09, 2017 10:00 AM CDT   (Arrive by 9:45 AM)   Ventricular Assist Device with Vicky Ziegler NP   [facility] ([facility])    [address] [place] [address]  3rd [facility] [address]   [phone]            Aug 15, 2017  2:00 PM CDT   Return Visit with Thomas Dill MD   [facility] ([facility])    [address]  [address] [place] [address]   [phone]            Sep 12, 2017  2:00 PM CDT   Return  Visit with Thomas Dill MD   [facility] ([facility])    [address]  [address]  [facility] [id number] [phone]            Oct 10, 2017  2:00 PM CDT   [facility] Visit with [facility]   [facility]    [address]  [address]  [facility] [id number] [phone]              Who to contact     Please call your clinic at [phone] to:    Ask questions about your health    Make or cancel appointments    Discuss your medicines    Learn about your test results    Speak to your doctor   If you have compliments or concerns about an experience at your clinic, or if you wish to file a complaint, please contact [facility] Patient Relations at [phone] or email us at [email or web address]         Additional Information About Your Visit        Ratiohart Information     [employer] is an electronic gateway that provides easy, online access to your medical records. With BravoSolution, you can request a clinic appointment, read your test results, renew a prescription or communicate with your care team.     To sign up for [employer]ravo[employer] visit the website at [email or web address]   You will be asked to enter the access code listed below, as well as some personal information. Please follow the directions to create your username and password.     Your access code is: [email or web address]  Expires: 2017  8:55 AM     Your access code will  in 90 days. If you need help or a new code, please contact your [facility] or call [phone] for assistance.        Care EveryWhere ID     This is your Care EveryWhere ID. This could be used by other organizations to access your [facility] medical records  [id number]         Blood Pressure from Last 3 Encounters:   17 96/58   17 90/66   05/15/17 101/78    Weight from Last 3 Encounters:   05/15/17 86.3 kg  (190 lb 4.1 oz)   05/02/17 81.6 kg (180 lb)   04/29/17 81.6 kg (180 lb)              Today, you had the following     No orders found for display         Today's Medication Changes          These changes are accurate as of: 6/27/17  1:12 PM.  If you have any questions, ask your nurse or doctor.               Start taking these medicines.        Dose/Directions    ketotifen 0.025 % Soln ophthalmic solution   Commonly known as:  ZADITOR/REFRESH ANTI-ITCH   Used for:  Allergic conjunctivitis, bilateral   Started by:  Jaleel Venegas MD        Dose:  1 drop   Place 1 drop into both eyes 2 times daily   Quantity:  1 Bottle   Refills:  11         These medicines have changed or have updated prescriptions.        Dose/Directions    acetaminophen 325 MG tablet   Commonly known as:  TYLENOL   This may have changed:    - when to take this  - reasons to take this   Used for:  Femoral neck fracture, right, closed, initial encounter        Dose:  650 mg   Take 2 tablets (650 mg) by mouth every 6 hours   Quantity:  100 tablet   Refills:  0       azelastine 0.05 % Soln ophthalmic solution   Commonly known as:  OPTIVAR   This may have changed:    - when to take this  - reasons to take this        Dose:  1 drop   Apply 1 drop to eye 2 times daily   Quantity:  1 Bottle   Refills:  11            Where to get your medicines      These medications were sent to [facility]/pharmacy #[id number] - [place], [place] - [address], [place] [address]     Phone:  [phone]     ketotifen 0.025 % Soln ophthalmic solution                Primary Care Provider Office Phone # Fax #    Thomas Dill [phone]        [facility] [address] [address]        Equal Access to Services     [facility] HospitalBRENDA : Rocky Rahman, jeana morrison, willie morse. So [facility] [phone].    ATENCIÓN: Pelon leal  español, tiene a smith disposición servicios gratuitos de asistencia lingüística. Dayron smith [phone].    We comply with applicable federal civil rights laws and [place] laws. We do not discriminate on the basis of race, color, national origin, age, disability sex, sexual orientation or gender identity.            Thank you!     Thank you for choosing [facility]  for your care. Our goal is always to provide you with excellent care. Hearing back from our patients is one way we can continue to improve our services. Please take a few minutes to complete the written survey that you may receive in the mail after your visit with us. Thank you!             Your Updated Medication List - Protect others around you: Learn how to safely use, store and throw away your medicines at www.disposemymeds.org.          This list is accurate as of: 6/27/17  1:12 PM.  Always use your most recent med list.                   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, unspecified site       ascorbic acid 500 MG tablet    VITAMIN C    90 tablet    Take 1 tablet (500 mg) by mouth daily With iron pill        ASPIRIN NOT PRESCRIBED    INTENTIONAL    0 each    Please choose reason not prescribed, below    LVAD (left ventricular assist device) present (H)       atorvastatin 10 MG tablet    LIPITOR    90 tablet    TAKE 1 TABLET (10 MG) BY MOUTH DAILY    Hyperlipidemia LDL goal <100       azelastine 0.05 % Soln ophthalmic solution    OPTIVAR    1 Bottle    Apply 1 drop to eye 2 times daily        * bisacodyl 5 MG EC tablet    DULCOLAX    20 tablet    Take 1 tablet (5 mg) by mouth daily as needed for constipation    Constipation       *  bisacodyl 10 MG Suppository    DULCOLAX    5 suppository    Place 1 suppository (10 mg) rectally daily as needed for constipation    Drug-induced constipation       blood glucose monitoring lancets     1 Box    Use to test blood sugars 2 times daily or as directed.    Diabetes mellitus, type 2 (H)       blood glucose monitoring test strip    no brand specified    1 Box    Use to test blood sugar 2 times daily or as directed.    Type 2 diabetes mellitus with stage 3 chronic kidney disease (H)       calcium carbonate-vitamin D 500-400 MG-UNIT Tabs per tablet     90 tablet    Take 1 tablet by mouth daily    Cardiac arrhythmia, unspecified cardiac arrhythmia type       finasteride 5 MG tablet    PROSCAR    90 tablet    Take 1 tablet (5 mg) by mouth daily    Incomplete bladder emptying       guaiFENesin-dextromethorphan 100-10 MG/5ML syrup    ROBITUSSIN DM    560 mL    Take 5 mLs by mouth every 4 hours as needed for cough    Chronic cough       ketotifen 0.025 % Soln ophthalmic solution    ZADITOR/REFRESH ANTI-ITCH    1 Bottle    Place 1 drop into both eyes 2 times daily    Allergic conjunctivitis, bilateral       levETIRAcetam 750 MG tablet    KEPPRA    180 tablet    Take 1 tablet (750 mg) by mouth 2 times daily    Convulsions, unspecified convulsion type (H)       loratadine 10 MG tablet    CLARITIN    90 tablet    Take 1 tablet (10 mg) by mouth daily    Allergic rhinitis, unspecified allergic rhinitis type       losartan 25 MG tablet    COZAAR    45 tablet    Take 1 tablet (25 mg) by mouth daily    CHF (congestive heart failure), NYHA class IV, chronic, systolic (H)       magnesium oxide 400 MG tablet    MAG-OX    14 tablet    Take 1 tablet (400 mg) by mouth 2 times daily    Abdominal distention, Drug-induced constipation       melatonin 3 MG tablet      Take 1 tablet (3 mg) by mouth At Bedtime    Insomnia, unspecified type       * metoprolol 25 MG 24 hr tablet    TOPROL-XL    90 tablet    TAKE 1 TABLET (25 MG) BY  MOUTH EVERY EVENING    LVAD (left ventricular assist device) present (H)       * metoprolol 50 MG 24 hr tablet    TOPROL-XL    90 tablet    Take 1 tablet (50 mg) by mouth daily in the morning.    LVAD (left ventricular assist device) present (H)       nitroglycerin 0.4 MG sublingual tablet    NITROSTAT    30 tablet    Place 1 tablet (0.4 mg) under the tongue every 5 minutes as needed for chest pain    Coronary artery disease involving native coronary artery with unstable angina pectoris (H)       nystatin [id number] UNIT/GM Powd    MYCOSTATIN    60 g    Apply to affected areas of skin in the groin and on the scrotum three times a day as needed.        * order for DME     1 Device    Equipment being ordered: Lift chair.  Please see indications and face-to-face encounter details in 2/3/15 Office Visit note.    Fracture of femoral neck, right, closed, initial encounter, Stroke (H), CHF (congestive heart failure), NYHA class IV (H)       * order for DME     2 each    Equipment being ordered: Bilateral leg supports for manual wheelchair.    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    Cerebrovascular accident (CVA) due to embolism of right middle cerebral artery (H), Closed fracture of neck of right femur  with nonunion, subsequent encounter       * order for DME     1 each    Equipment being ordered: Wheelchair, manual, with elevated leg rests and tilt in space back.  Please fax to [facility]; I called them 11/26/16 and they requested the new order.  Face to face is in my 10/26/16 note.    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, subsequent encounter       pantoprazole 40 MG EC tablet    PROTONIX    180 tablet    Take 1 tablet (40 mg) by mouth daily    Gastrointestinal hemorrhage associated with chronic superficial gastritis       SENEXON-S 8.6-50 MG per tablet   Generic drug:  senna-docusate     60 tablet    TAKE 1-2 TABLETS BY MOUTH 2 TIMES DAILY AS NEEDED FOR CONSTIPATION    Slow transit constipation       simethicone 80 MG chewable tablet    MYLICON    180 tablet    Take 1 tablet (80 mg) by mouth 4 times daily    Slow transit constipation       tamsulosin 0.4 MG capsule    FLOMAX    90 capsule    Take 1 capsule (0.4 mg) by mouth daily    Incomplete bladder emptying       thiamine 100 MG tablet     90 tablet    TAKE 1 TABLET (100 MG) BY MOUTH DAILY    Cerebrovascular accident (CVA) due to embolism of right middle cerebral artery (H)       warfarin 3 MG tablet    COUMADIN    180 tablet    Take 3mg by mouth on Mondays and Thursdays. Take 4.5mg by mouth all other days of the week.    Cerebrovascular accident (CVA) due to embolism of right middle cerebral artery (H)       * Notice:  This list has 11 medication(s) that are the same as other medications  prescribed for you. Read the directions carefully, and ask your doctor or other care provider to review them with you.

## 2017-06-27 NOTE — NURSING NOTE
Chief Complaints and History of Present Illnesses   Patient presents with     Pseudophakia Follow Up     HPI    Affected eye(s):  Both   Symptoms:     Blurred vision   No decreased vision   No difficulty with reading   Itching         Do you have eye pain now?:  No      Comments:  Would like distance and reading glasses. VA worse LE. BE itchy. Pt here today with daughter. Initially I had Laser View  over the phone but pt and daughter decided he did not want  over the phone and daughter would interpret. Pt signed waiver. While checking for MR pt requested phone  because he and daughter felt like he was confused. I am deferring to MD for MR. I did not make any progress.

## 2017-06-27 NOTE — PATIENT INSTRUCTIONS
-Continue using ketotifen 1 drop both eyes 2 times daily for itchy eyes  -A refill has been sent to the [facility] pharmacy on [address]  -A glasses prescription has been provided for you to take to any optical  -You should follow-up in 1 year or sooner if there are any new issues

## 2017-06-27 NOTE — Clinical Note
Marlene Dill,  Mr. Rendon was seen in the eye clinic today. His daughter requested that we test him for new glasses and assess his eye itching. Assessing him for glasses is difficult because he is not able to sit behind a phoropter with an eye chart at the required distance but we made do with sufficient but less optimal methods. His vision improves significantly with a new prescription which was provided to his daughter. They can take this to any optical to have glasses made.  For his eye itching, he should continue on ketotifen/Zaditor 1 drop 2 times daily both eyes. I sent this in to the [facility] on [address]. The daughter says the really have not been using this so encouraged using it scheduled. The azelastine/Optivar has the same mechanism of action so I discontinued this. They can also use OTC artificial tears as needed for comfort.  He continues to have a cataract in the left eye. We will observe until more visually significant. The rest of his exam was quite good.

## 2017-06-27 NOTE — PROGRESS NOTES
CC: Eye Exam    HPI: Sohan Rendon is a 67 year old male who presents for comprehensive eye exam. His daughter would like new glasses for him and says his eyes have been itching. The patient is bed bound due to a hip fracture and previous strokes. He is present without an  as the daughter declined.    POH: CE/IOL OD 11/19/2015 (Dr. Cosme, goal -2.50)  PMH: CVA x 3 (last subcortical  9-10/2014, R PICA, R MCA), Seizure Disorder, HTN, HLD, CAD s/p MI 1998 s/p CABGx5, CHF s/p LVAD & ICD, latent TB, biliary tract obs s/p ERCP, GERD, CKD, BPH, Gout, R femur fracture, on chronic anticoagulation due to LVAD  PSH: CABG 2001, LVAD placement 2012 , ICD placement 2011, mitral and PFO repair 2012, J tube placement, EGD/ERCP 2015  Meds: per EPIC  Allergies: NKDA  FH: non-contributory   SOC: former smoker, patient is bed bound due to multiple strokes lives at home with hospital equipment and care by daughters, home visits by [facility]    Current Eye Medications:   ketotifen/Zaditor  azelastine/Optivar    Assessment & Plan   Sohan Rendon is a 67 year old male with the following diagnoses:     1. Allergic Conjunctivitis, Both Eyes   - continued ketotifen/Zaditor 1 drop 2 times daily both eyes for itching   - daughter says have not been using scheduled and will try to be better about this   - may continue artificial tears for tearing which may be reflex tears due to irritation from dryness   - warm compresses are also helpful, warm water soaked wash cloth 5-10 minutes a day resting on closed eyelids     2. Pseudophkia, Right Eye   - refractive goal was a -2.50   - surgery was done at [facility] OR given medical issues and required iris hooks and Trypan blue    3. Cataract, Left Eye   - observe until more symptomatic given complex medical picture   - previous surgery had to be done at [facility] OR, was topical due to anti-coagulation   - note, will need iris hooks (on Flomax) and Trypan     4. Homonymous Hemianopia, Left     - has had emoblic strokes from LVAD despite careful anticoagulation   - last subcortical stroke 9-10/2014, but also previous R MCA, and R PICA    5. Refractive Error, Both Eyes   - refraction difficult as patient is bed bound   - used data from cycloplegic retinoscopy and auto-refractor   - note refractive goal was -2.50 in the right eye but requested distance refraction today   - new Rx provided with executive bifocal    Return in 1 year for comprehensive eye exam, sooner if new issues    Patient is bed bound due to right hip fracture and multiple strokes. He was transported from home via ambulance and examined in the clinic procedure room with bed adjusted into a recliner. The ETDRS chart was wheeled in to assess vision. Refraction was preformed using retinoscopy and auto-refractor     Discussed with Dr. Agosto,    Jaleel Venegas MD PGY-3  Ophthalmology      Attending Physician Attestation:  Complete documentation of historical and exam elements from today's encounter can be found in the full encounter summary report (not reduplicated in this progress note).  I personally obtained the chief complaint(s) and history of present illness.  I confirmed and edited as necessary the review of systems, past medical/surgical history, family history, social history, and examination findings as documented by others; and I examined the patient myself.  I personally reviewed the relevant tests, images, and reports as documented above.  I formulated and edited as necessary the assessment and plan and discussed the findings and management plan with the patient and family. . - Delgado Agosto MD

## 2017-06-28 ENCOUNTER — HOSPITAL ENCOUNTER (EMERGENCY)
Facility: CLINIC | Age: 67
Discharge: HOME | DRG: 872 | End: 2017-06-29
Attending: EMERGENCY MEDICINE | Admitting: EMERGENCY MEDICINE
Payer: COMMERCIAL

## 2017-06-28 DIAGNOSIS — N39.0 URINARY TRACT INFECTION, SITE UNSPECIFIED: ICD-10-CM

## 2017-06-28 DIAGNOSIS — Z95.811 HEART REPLACED BY HEART ASSIST DEVICE (H): ICD-10-CM

## 2017-06-28 DIAGNOSIS — N18.30 STAGE 3 CHRONIC KIDNEY DISEASE (H): ICD-10-CM

## 2017-06-28 DIAGNOSIS — R31.9 HEMATURIA: ICD-10-CM

## 2017-06-28 DIAGNOSIS — Z79.01 LONG TERM (CURRENT) USE OF ANTICOAGULANTS: ICD-10-CM

## 2017-06-28 DIAGNOSIS — R30.0 DYSURIA: ICD-10-CM

## 2017-06-28 DIAGNOSIS — I69.354 HEMIPARESIS AFFECTING LEFT SIDE AS LATE EFFECT OF CEREBROVASCULAR ACCIDENT (H): ICD-10-CM

## 2017-06-28 LAB
ALBUMIN SERPL-MCNC: 3.2 G/DL (ref 3.4–5)
ALBUMIN UR-MCNC: 300 MG/DL
ALP SERPL-CCNC: 106 U/L (ref 40–150)
ALT SERPL W P-5'-P-CCNC: 19 U/L (ref 0–70)
ANION GAP SERPL CALCULATED.3IONS-SCNC: 7 MMOL/L (ref 3–14)
ANION GAP SERPL CALCULATED.3IONS-SCNC: 9 MMOL/L (ref 3–14)
APPEARANCE UR: ABNORMAL
AST SERPL W P-5'-P-CCNC: 64 U/L (ref 0–45)
BASOPHILS # BLD AUTO: 0 10E9/L (ref 0–0.2)
BASOPHILS NFR BLD AUTO: 0.4 %
BILIRUB SERPL-MCNC: 1 MG/DL (ref 0.2–1.3)
BILIRUB UR QL STRIP: NEGATIVE
BUN SERPL-MCNC: 23 MG/DL (ref 7–30)
BUN SERPL-MCNC: 25 MG/DL (ref 7–30)
CALCIUM SERPL-MCNC: 8.2 MG/DL (ref 8.5–10.1)
CALCIUM SERPL-MCNC: 8.2 MG/DL (ref 8.5–10.1)
CHLORIDE SERPL-SCNC: 109 MMOL/L (ref 94–109)
CHLORIDE SERPL-SCNC: 112 MMOL/L (ref 94–109)
CO2 SERPL-SCNC: 21 MMOL/L (ref 20–32)
CO2 SERPL-SCNC: 23 MMOL/L (ref 20–32)
COLOR UR AUTO: ABNORMAL
CREAT SERPL-MCNC: 1.97 MG/DL (ref 0.66–1.25)
CREAT SERPL-MCNC: 1.97 MG/DL (ref 0.66–1.25)
DIFFERENTIAL METHOD BLD: ABNORMAL
EOSINOPHIL # BLD AUTO: 0.3 10E9/L (ref 0–0.7)
EOSINOPHIL NFR BLD AUTO: 3.6 %
ERYTHROCYTE [DISTWIDTH] IN BLOOD BY AUTOMATED COUNT: 18.1 % (ref 10–15)
GFR SERPL CREATININE-BSD FRML MDRD: 34 ML/MIN/1.7M2
GFR SERPL CREATININE-BSD FRML MDRD: 34 ML/MIN/1.7M2
GLUCOSE SERPL-MCNC: 108 MG/DL (ref 70–99)
GLUCOSE SERPL-MCNC: 132 MG/DL (ref 70–99)
GLUCOSE UR STRIP-MCNC: NEGATIVE MG/DL
HCT VFR BLD AUTO: 35.3 % (ref 40–53)
HGB BLD-MCNC: 11.5 G/DL (ref 13.3–17.7)
HGB UR QL STRIP: ABNORMAL
IMM GRANULOCYTES # BLD: 0.1 10E9/L (ref 0–0.4)
IMM GRANULOCYTES NFR BLD: 0.5 %
INR PPP: 2.68 (ref 0.86–1.14)
KETONES UR STRIP-MCNC: NEGATIVE MG/DL
LACTATE BLD-SCNC: 1.6 MMOL/L (ref 0.7–2.1)
LEUKOCYTE ESTERASE UR QL STRIP: ABNORMAL
LYMPHOCYTES # BLD AUTO: 1.1 10E9/L (ref 0.8–5.3)
LYMPHOCYTES NFR BLD AUTO: 11.6 %
MCH RBC QN AUTO: 29.9 PG (ref 26.5–33)
MCHC RBC AUTO-ENTMCNC: 32.6 G/DL (ref 31.5–36.5)
MCV RBC AUTO: 92 FL (ref 78–100)
MONOCYTES # BLD AUTO: 0.8 10E9/L (ref 0–1.3)
MONOCYTES NFR BLD AUTO: 8.2 %
MUCOUS THREADS #/AREA URNS LPF: PRESENT /LPF
NEUTROPHILS # BLD AUTO: 6.9 10E9/L (ref 1.6–8.3)
NEUTROPHILS NFR BLD AUTO: 75.7 %
NITRATE UR QL: NEGATIVE
NRBC # BLD AUTO: 0 10*3/UL
NRBC BLD AUTO-RTO: 0 /100
PH UR STRIP: 7 PH (ref 5–7)
PLATELET # BLD AUTO: 142 10E9/L (ref 150–450)
POTASSIUM SERPL-SCNC: 3.8 MMOL/L (ref 3.4–5.3)
POTASSIUM SERPL-SCNC: 3.9 MMOL/L (ref 3.4–5.3)
PROT SERPL-MCNC: 7.6 G/DL (ref 6.8–8.8)
RBC # BLD AUTO: 3.84 10E12/L (ref 4.4–5.9)
RBC #/AREA URNS AUTO: 22 /HPF (ref 0–2)
SODIUM SERPL-SCNC: 139 MMOL/L (ref 133–144)
SODIUM SERPL-SCNC: 142 MMOL/L (ref 133–144)
SP GR UR STRIP: 1.01 (ref 1–1.03)
SQUAMOUS #/AREA URNS AUTO: 1 /HPF (ref 0–1)
TRANS CELLS #/AREA URNS HPF: <1 /HPF (ref 0–1)
URN SPEC COLLECT METH UR: ABNORMAL
UROBILINOGEN UR STRIP-MCNC: NORMAL MG/DL (ref 0–2)
WBC # BLD AUTO: 9.1 10E9/L (ref 4–11)
WBC #/AREA URNS AUTO: 581 /HPF (ref 0–2)

## 2017-06-28 PROCEDURE — 80048 BASIC METABOLIC PNL TOTAL CA: CPT

## 2017-06-28 PROCEDURE — 99283 EMERGENCY DEPT VISIT LOW MDM: CPT | Mod: Z6 | Performed by: EMERGENCY MEDICINE

## 2017-06-28 ASSESSMENT — ENCOUNTER SYMPTOMS
ABDOMINAL PAIN: 0
VOMITING: 0
BACK PAIN: 0
HEMATURIA: 1
DIARRHEA: 0
FEVER: 0
BLOOD IN STOOL: 0
DYSURIA: 1
NAUSEA: 0
CHILLS: 0
CONSTIPATION: 0

## 2017-06-28 NOTE — PROGRESS NOTES
AMANDA did not hear back from Zari JONES from [facility] ([phone]) after she was to meet with her supervisor regarding CADI CM for client. This CM left her a message on 6/19 to call back with an update. As of today, she has not called this CM back so another message was left with her. CM also received a call from the PCA agency asking where to send the authorization for extended PCA. This CM cannot authorize extended PCA  because it is under the CADI waiver. CM requested he leave a message with Zari Beasley also. CM also called the [facility] for LTC and left a message requesting assistance in getting this resolved with [facility].   Nena Salazar RN  [employer]   [phone]

## 2017-06-28 NOTE — ED AVS SNAPSHOT
[facility], [place], Emergency Department    [address] [address]    Phone:  [phone]                                       Sohan Rendon   MRN: [id number]    Department:  [facility], Emergency Department   Date of Visit:  6/28/2017           After Visit Summary Signature Page     I have received my discharge instructions, and my questions have been answered. I have discussed any challenges I see with this plan with the nurse or doctor.    ..........................................................................................................................................  Patient/Patient Representative Signature      ..........................................................................................................................................  Patient Representative Print Name and Relationship to Patient    ..................................................               ................................................  Date                                            Time    ..........................................................................................................................................  Reviewed by Signature/Title    ...................................................              ..............................................  Date                                                            Time

## 2017-06-28 NOTE — ED AVS SNAPSHOT
[facility], Emergency Department    [address] [address]    Phone:  [phone]                                       Sohan Rendon   MRN: [id number]    Department:  [facility], Emergency Department   Date of Visit:  6/28/2017           Patient Information     Date Of Birth          1950        Your diagnoses for this visit were:     Dysuria     Urinary tract infection, site unspecified        You were seen by Nohelia Arellano MD.        Discharge Instructions       TODAY'S VISIT:  You were seen today for blood tinged urine and discomfort with urination, and were found to have a urinary tract infection but your vital signs and the remainder of your blood tests were appropriate.  - We discussed antibiotics for you with the Pharmacist. The choice of antibiotic is somewhat difficult, given your need for the blood thinning medication, warfarin, and balancing the need for that medication as well as not making your blood too thin with additional antibiotics.  Because of this, we had to choose an antibiotic does not always treat all urinary tract infections completely, and so it is very important that if symptoms do not improve after 2 days, that you may need to be started on a different antibiotic and you should discuss this with a medical provider. Please schedule a follow-up appointment with your usual providers for Friday to discuss this.     FOLLOW-UP:  Please make an appointment to follow up with:  - Your usual Primary Care Provider and Cardiology/ LVAD teams (phone: [phone]) as soon as possible.   --- We think you should be seen within 1-2 days to ensure you are continuing to improve.   - You should also follow-up with the Urology Clinic (phone: [phone]) as previously planned.       PRESCRIPTIONS / MEDICATIONS:  - Please take the prescribed antibiotic until completed or told otherwise by a medical provider.     RETURN TO THE EMERGENCY DEPARTMENT  Return to the Emergency  Department at any time for new/worsening symptoms.         Urinary Tract Infections in Men  Urinary tract infections (UTIs) are most often caused by bacteria (germs) that invade the urinary tract. The bacteria may come from outside the body. Or they may travel from the skin outside of rectum into the urethra. Pain in or around the urinary tract is a common symptom for most UTIs. But the only way to know for sure if you have a UTI is to have a urinalysis and urine culture.     Four Types of UTIs    Cystitis: A bladder infection, or cystitis, is often linked to a blockage from an enlarged prostate. You may have an urgent or frequent need to urinate, and bloody urine. Treatment includes antibiotics and medications to relax or shrink the prostate. In some cases, surgery is needed.    Urethritis: This is an infection of the urethra. You may have a discharge from the urethra or burning when you urinate.You may also have pain in the urethra or penis. Urethritis is treated with antibiotics.    Prostatitis: This is an inflammation or infection of the prostate. You may have an urgent or frequent need to urinate, fever, or burning when you urinate. Or you may have a tender prostate, or a vague feeling of pressure. Prostatitis is treated with a range of medications, depending on the cause.    Pyelonephritis: This is a kidney infection. If not treated, it can be serious and damage your kidneys. In severe cases you may be hospitalized. You may have a fever and upper back pain.  Treating a UTI    Medications: Most UTIs are treated with antibiotics. These kill the bacteria. The length of time you need to take them depends on the type of infection. Take antibiotics exactly as directed until all of the medication is gone. If you do not, the infection may not go away and may become harder to treat. For certain types of UTIs, you may be given other medications to help treat your symptoms.    Lifestyle changes: The lifestyle changes  below will help get rid of your current infection. They may also help prevent future UTIs.    Drink plenty of fluids such as water, juice, or other caffeine-free drinks. This helps flush bacteria out of your system.    Empty your bladder when you feel the urge to urinate and before going to sleep. Urine that stays in your bladder promotes infection.    Use condoms during sex. These help prevent UTIs caused by sexually transmitted bacteria.    Keep follow-up appointments with your health care provider. He or she can may do tests to make sure the infection has cleared. If necessary, additional treatment can be started.    Additional treatment: Most UTIs respond to medication. But sometimes a procedure or surgery is needed. This can treat an enlarged prostate, or remove a kidney stone or other blockage. Surgery may also treat problems caused by scarring or long-term infections.    6383-1909 [employer]. [address] [place], [place] [id number]. All rights reserved. This information is not intended as a substitute for professional medical care. Always follow your healthcare professional's instructions.        Blood in the Urine    Blood in the urine (hematuria) has many possible causes. If it occurs after an injury (such as a car accident or fall), it is most often a sign of bruising to the kidney or bladder. Common causes of blood in the urine include urinary tract infections, kidney stones, inflammation, tumors, or certain other diseases of the kidney or bladder. Menstruation can cause blood to appear in the urine sample, although it is not coming from the urinary tract.  If only a trace amount of blood is present, it will show up on the urine test, even though the urine may be yellow and not pink or red. This may occur with any of the above conditions, as well as heavy exercise or high fever. In this case, your doctor may want to repeat the urine test on another day. This will show if the blood is  still present. If it is, then other tests can be done to find out the cause.  Home care  Follow these home care guidelines:    If your urine does not appear bloody (pink, brown or red) then you do not need to restrict your activity in any way.    If you can see blood in your urine, rest and avoid heavy exertion until your next exam. Do not use aspirin, blood thinners, or anti-platelet or anti-inflammatory medicines. These include ibuprofen and naproxen. These thin the blood and may increase bleeding.  Follow-up care  Follow up with your healthcare provider, or as advised. If you were injured and had blood in your urine, you should have a repeat urine test in 1 to 2 days. Contact your doctor for this test.  A radiologist will review any X-rays that were taken. You will be told of any new findings that may affect your care.  When to seek medical advice  Call your healthcare provider right away if any of these occur:    Bright red blood or blood clots in the urine (if you did not have this before)    Weakness, dizziness or fainting    New groin, abdominal, or back pain    Fever of 100.4 F (38 C) or higher, or as directed by your healthcare provider    Repeated vomiting    Bleeding from the nose or gums or easy bruising  Date Last Reviewed: 9/1/2016 2000-2017 [employer]. [address] [place] [address]. All rights reserved. This information is not intended as a substitute for professional medical care. Always follow your healthcare professional's instructions.            Future Appointments        Provider Department Dept Phone Center    7/18/2017 2:00 PM Thomas Dill MD [facility] [phone] [facility]    8/9/2017 9:30 AM Lab  Health Lab [phone] [facility]    8/9/2017 10:00 AM Vicky Ziegler NP [facility] [phone] [facility]    8/15/2017 2:00 PM Thomas Dill MD [facility] [phone] [facility]    9/12/2017 2:00 PM Thomas Dill  MD [facility] [phone] [facility]    10/10/2017 2:00 PM Thomas Dill MD [facility] [phone] [facility]      24 Hour Appointment Hotline       To make an appointment at any [facility], call [phone] ([phone]). If you don't have a family doctor or clinic, we will help you find one. [facility] are conveniently located to serve the needs of you and your family.             Review of your medicines      START taking        Dose / Directions Last dose taken    cefdinir 300 MG capsule   Commonly known as:  OMNICEF   Dose:  300 mg   Quantity:  20 capsule        Take 1 capsule (300 mg) by mouth 2 times daily   Refills:  0          Our records show that you are taking the medicines listed below. If these are incorrect, please call your family doctor or clinic.        Dose / Directions Last dose taken    acetaminophen 325 MG tablet   Commonly known as:  TYLENOL   Dose:  650 mg   Quantity:  100 tablet        Take 2 tablets (650 mg) by mouth every 6 hours   Refills:  0        allopurinol 100 MG tablet   Commonly known as:  ZYLOPRIM   Dose:  100 mg   Quantity:  90 tablet        Take 1 tablet (100 mg) by mouth daily   Refills:  3        ascorbic acid 500 MG tablet   Commonly known as:  VITAMIN C   Dose:  500 mg   Quantity:  90 tablet        Take 1 tablet (500 mg) by mouth daily With iron pill   Refills:  3        ASPIRIN NOT PRESCRIBED   Commonly known as:  INTENTIONAL   Quantity:  0 each        Please choose reason not prescribed, below   Refills:  0        atorvastatin 10 MG tablet   Commonly known as:  LIPITOR   Quantity:  90 tablet        TAKE 1 TABLET (10 MG) BY MOUTH DAILY   Refills:  3        * bisacodyl 5 MG EC tablet   Commonly known as:  DULCOLAX   Dose:  5 mg   Quantity:  20 tablet        Take 1 tablet (5 mg) by mouth daily as needed for constipation   Refills:  1        * bisacodyl 10 MG Suppository   Commonly known as:  DULCOLAX   Dose:  10 mg   Quantity:   5 suppository        Place 1 suppository (10 mg) rectally daily as needed for constipation   Refills:  1        blood glucose monitoring lancets   Quantity:  1 Box        Use to test blood sugars 2 times daily or as directed.   Refills:  3        blood glucose monitoring test strip   Commonly known as:  no brand specified   Quantity:  1 Box        Use to test blood sugar 2 times daily or as directed.   Refills:  3        calcium carbonate-vitamin D 500-400 MG-UNIT Tabs per tablet   Dose:  1 tablet   Quantity:  90 tablet        Take 1 tablet by mouth daily   Refills:  3        finasteride 5 MG tablet   Commonly known as:  PROSCAR   Dose:  5 mg   Quantity:  90 tablet        Take 1 tablet (5 mg) by mouth daily   Refills:  3        guaiFENesin-dextromethorphan 100-10 MG/5ML syrup   Commonly known as:  ROBITUSSIN DM   Dose:  5 mL   Quantity:  560 mL        Take 5 mLs by mouth every 4 hours as needed for cough   Refills:  3        ketotifen 0.025 % Soln ophthalmic solution   Commonly known as:  ZADITOR/REFRESH ANTI-ITCH   Dose:  1 drop   Quantity:  1 Bottle        Place 1 drop into both eyes 2 times daily   Refills:  11        levETIRAcetam 750 MG tablet   Commonly known as:  KEPPRA   Dose:  750 mg   Quantity:  180 tablet        Take 1 tablet (750 mg) by mouth 2 times daily   Refills:  3        loratadine 10 MG tablet   Commonly known as:  CLARITIN   Dose:  10 mg   Quantity:  90 tablet        Take 1 tablet (10 mg) by mouth daily   Refills:  3        losartan 25 MG tablet   Commonly known as:  COZAAR   Dose:  25 mg   Quantity:  45 tablet        Take 1 tablet (25 mg) by mouth daily   Refills:  3        magnesium oxide 400 MG tablet   Commonly known as:  MAG-OX   Dose:  400 mg   Quantity:  14 tablet        Take 1 tablet (400 mg) by mouth 2 times daily   Refills:  0        melatonin 3 MG tablet   Dose:  3 mg        Take 1 tablet (3 mg) by mouth At Bedtime   Refills:  0        * metoprolol 25 MG 24 hr tablet   Commonly known  as:  TOPROL-XL   Quantity:  90 tablet        TAKE 1 TABLET (25 MG) BY MOUTH EVERY EVENING   Refills:  3        * metoprolol 50 MG 24 hr tablet   Commonly known as:  TOPROL-XL   Dose:  50 mg   Quantity:  90 tablet        Take 1 tablet (50 mg) by mouth daily in the morning.   Refills:  3        nitroGLYcerin 0.4 MG sublingual tablet   Commonly known as:  NITROSTAT   Dose:  0.4 mg   Quantity:  30 tablet        Place 1 tablet (0.4 mg) under the tongue every 5 minutes as needed for chest pain   Refills:  1        nystatin 686814 UNIT/GM Powd   Commonly known as:  MYCOSTATIN   Quantity:  60 g        Apply to affected areas of skin in the groin and on the scrotum three times a day as needed.   Refills:  3        * order for DME   Quantity:  1 Device        Equipment being ordered: Lift chair.  Please see indications and face-to-face encounter details in 2/3/15 Office Visit note.   Refills:  0        * order for DME   Quantity:  2 each        Equipment being ordered: Bilateral leg supports for manual wheelchair.   Refills:  0        * order for DME   Quantity:  1 each        Equipment being ordered: Reclining manual w/c with bilateral elevating leg rests.   Refills:  0        * order for DME   Quantity:  1 each        Equipment being ordered: Hospital Bed, fully electric.   Refills:  0        * order for DME   Quantity:  1 each        Equipment being ordered: Wheelchair, manual.   Refills:  0        * order for DME   Quantity:  1 each        Equipment being ordered: Wheelchair, manual, with elevated leg rests and tilt in space back.  Please fax to [facility]; I called them 11/26/16 and they requested the new order.  Face to face is in my 10/26/16 note.   Refills:  0        * order for DME   Quantity:  2 each        Equipment being ordered: Cushioned heel boots.   Refills:  0        oxyCODONE 5 MG IR tablet   Commonly known as:  ROXICODONE   Dose:  5 mg   Quantity:  30 tablet        Take 1 tablet (5 mg) by mouth every 4 hours  as needed for moderate to severe pain   Refills:  0        pantoprazole 40 MG EC tablet   Commonly known as:  PROTONIX   Dose:  40 mg   Quantity:  180 tablet        Take 1 tablet (40 mg) by mouth daily   Refills:  3        SENEXON-S 8.6-50 MG per tablet   Quantity:  60 tablet   Generic drug:  senna-docusate        TAKE 1-2 TABLETS BY MOUTH 2 TIMES DAILY AS NEEDED FOR CONSTIPATION   Refills:  3        simethicone 80 MG chewable tablet   Commonly known as:  MYLICON   Dose:  80 mg   Quantity:  180 tablet        Take 1 tablet (80 mg) by mouth 4 times daily   Refills:  5        tamsulosin 0.4 MG capsule   Commonly known as:  FLOMAX   Dose:  0.4 mg   Quantity:  90 capsule        Take 1 capsule (0.4 mg) by mouth daily   Refills:  3        thiamine 100 MG tablet   Quantity:  90 tablet        TAKE 1 TABLET (100 MG) BY MOUTH DAILY   Refills:  3        warfarin 3 MG tablet   Commonly known as:  COUMADIN   Quantity:  180 tablet        Take 3mg by mouth on Mondays and Thursdays. Take 4.5mg by mouth all other days of the week.   Refills:  3        * Notice:  This list has 11 medication(s) that are the same as other medications prescribed for you. Read the directions carefully, and ask your doctor or other care provider to review them with you.            Prescriptions were sent or printed at these locations (1 Prescription)                   Other Prescriptions                Printed at Department/Unit printer (1 of 1)         cefdinir (OMNICEF) 300 MG capsule                Procedures and tests performed during your visit     Bladder scan    CBC with platelets differential    Comprehensive metabolic panel    INR    Lactic acid whole blood    UA with Microscopic reflex to Culture    Urine Culture Aerobic Bacterial    V[facility] Access Care Adult IP Consult      Orders Needing Specimen Collection     None      Pending Results     Date and Time Order Name Status Description    6/28/2017 2007 Urine Culture Aerobic Bacterial  "Preliminary             Pending Culture Results     Date and Time Order Name Status Description    2017 Urine Culture Aerobic Bacterial Preliminary             Pending Results Instructions     If you had any lab results that were not finalized at the time of your Discharge, you can call the ED Lab Result RN at [phone]. You will be contacted by this team for any positive Lab results or changes in treatment. The nurses are available 7 days a week from 10A to 6:30P.  You can leave a message 24 hours per day and they will return your call.        Thank you for choosing [facility]       Thank you for choosing [facility] for your care. Our goal is always to provide you with excellent care. Hearing back from our patients is one way we can continue to improve our services. Please take a few minutes to complete the written survey that you may receive in the mail after you visit with us. Thank you!        [employer] Information     [employer] lets you send messages to your doctor, view your test results, renew your prescriptions, schedule appointments and more. To sign up, go to [email or web address] . Click on \"Log in\" on the left side of the screen, which will take you to the Welcome page. Then click on \"Sign up Now\" on the right side of the page.     You will be asked to enter the access code listed below, as well as some personal information. Please follow the directions to create your username and password.     Your access code is: [email or web address]  Expires: 2017  8:55 AM     Your access code will  in 90 days. If you need help or a new code, please call your [facility] clinic or [phone].        Care EveryWhere ID     This is your Care EveryWhere ID. This could be used by other organizations to access your [facility] medical records  [id number]        Equal Access to Services     [facility] BRODY AH: jeana Roland, willie morse " la'francisco olamide. So [facility] [phone].    ATENCIÓN: Si habla español, tiene a smith disposición servicios gratuitos de asistencia lingüística. Llame al [phone].    We comply with applicable federal civil rights laws and [place] laws. We do not discriminate on the basis of race, color, national origin, age, disability sex, sexual orientation or gender identity.            After Visit Summary       This is your record. Keep this with you and show to your community pharmacist(s) and doctor(s) at your next visit.

## 2017-06-29 ENCOUNTER — ANTICOAGULATION THERAPY VISIT (OUTPATIENT)
Dept: ANTICOAGULATION | Facility: CLINIC | Age: 67
End: 2017-06-29

## 2017-06-29 ENCOUNTER — HOSPITAL ENCOUNTER (INPATIENT)
Facility: CLINIC | Age: 67
LOS: 3 days | Discharge: HOME HEALTH | DRG: 872 | End: 2017-07-02
Attending: INTERNAL MEDICINE | Admitting: INTERNAL MEDICINE
Payer: COMMERCIAL

## 2017-06-29 VITALS
DIASTOLIC BLOOD PRESSURE: 80 MMHG | HEART RATE: 77 BPM | SYSTOLIC BLOOD PRESSURE: 102 MMHG | TEMPERATURE: 97.1 F | OXYGEN SATURATION: 95 % | RESPIRATION RATE: 18 BRPM

## 2017-06-29 DIAGNOSIS — I63.411 CEREBROVASCULAR ACCIDENT (CVA) DUE TO EMBOLISM OF RIGHT MIDDLE CEREBRAL ARTERY (H): ICD-10-CM

## 2017-06-29 DIAGNOSIS — Z95.811 LVAD (LEFT VENTRICULAR ASSIST DEVICE) PRESENT (H): ICD-10-CM

## 2017-06-29 DIAGNOSIS — Z79.01 LONG-TERM (CURRENT) USE OF ANTICOAGULANTS: ICD-10-CM

## 2017-06-29 DIAGNOSIS — I48.21 PERMANENT ATRIAL FIBRILLATION (H): ICD-10-CM

## 2017-06-29 DIAGNOSIS — N39.0 URINARY TRACT INFECTION, SITE NOT SPECIFIED: ICD-10-CM

## 2017-06-29 DIAGNOSIS — I50.22 CHF (CONGESTIVE HEART FAILURE), NYHA CLASS IV, CHRONIC, SYSTOLIC (H): Primary | ICD-10-CM

## 2017-06-29 LAB
ALBUMIN SERPL-MCNC: 3.3 G/DL (ref 3.4–5)
ALP SERPL-CCNC: 124 U/L (ref 40–150)
ALT SERPL W P-5'-P-CCNC: 20 U/L (ref 0–70)
ANION GAP SERPL CALCULATED.3IONS-SCNC: 12 MMOL/L (ref 3–14)
AST SERPL W P-5'-P-CCNC: 59 U/L (ref 0–45)
BILIRUB SERPL-MCNC: 1.3 MG/DL (ref 0.2–1.3)
BUN SERPL-MCNC: 22 MG/DL (ref 7–30)
CALCIUM SERPL-MCNC: 8.2 MG/DL (ref 8.5–10.1)
CHLORIDE SERPL-SCNC: 109 MMOL/L (ref 94–109)
CO2 SERPL-SCNC: 18 MMOL/L (ref 20–32)
CREAT SERPL-MCNC: 1.78 MG/DL (ref 0.66–1.25)
ERYTHROCYTE [DISTWIDTH] IN BLOOD BY AUTOMATED COUNT: 18.6 % (ref 10–15)
GFR SERPL CREATININE-BSD FRML MDRD: 38 ML/MIN/1.7M2
GLUCOSE SERPL-MCNC: 152 MG/DL (ref 70–99)
HCT VFR BLD AUTO: 35.5 % (ref 40–53)
HGB BLD-MCNC: 11.2 G/DL (ref 13.3–17.7)
INR PPP: 3.31 (ref 0.86–1.14)
LDH SERPL L TO P-CCNC: 1434 U/L (ref 85–227)
MAGNESIUM SERPL-MCNC: 1.8 MG/DL (ref 1.6–2.3)
MCH RBC QN AUTO: 29.9 PG (ref 26.5–33)
MCHC RBC AUTO-ENTMCNC: 31.5 G/DL (ref 31.5–36.5)
MCV RBC AUTO: 95 FL (ref 78–100)
MRSA DNA SPEC QL NAA+PROBE: NORMAL
PHOSPHATE SERPL-MCNC: 2.3 MG/DL (ref 2.5–4.5)
PLATELET # BLD AUTO: 155 10E9/L (ref 150–450)
POTASSIUM SERPL-SCNC: 4.1 MMOL/L (ref 3.4–5.3)
PROT SERPL-MCNC: 7.9 G/DL (ref 6.8–8.8)
RBC # BLD AUTO: 3.75 10E12/L (ref 4.4–5.9)
SODIUM SERPL-SCNC: 138 MMOL/L (ref 133–144)
SPECIMEN SOURCE: NORMAL
WBC # BLD AUTO: 9 10E9/L (ref 4–11)

## 2017-06-29 PROCEDURE — 84100 ASSAY OF PHOSPHORUS: CPT | Performed by: INTERNAL MEDICINE

## 2017-06-29 PROCEDURE — 25000128 H RX IP 250 OP 636: Performed by: INTERNAL MEDICINE

## 2017-06-29 PROCEDURE — 85610 PROTHROMBIN TIME: CPT | Performed by: INTERNAL MEDICINE

## 2017-06-29 PROCEDURE — 36415 COLL VENOUS BLD VENIPUNCTURE: CPT | Performed by: INTERNAL MEDICINE

## 2017-06-29 PROCEDURE — 25000132 ZZH RX MED GY IP 250 OP 250 PS 637: Performed by: INTERNAL MEDICINE

## 2017-06-29 PROCEDURE — 20000004 ZZH R&B ICU UMMC

## 2017-06-29 PROCEDURE — 87640 STAPH A DNA AMP PROBE: CPT | Performed by: INTERNAL MEDICINE

## 2017-06-29 PROCEDURE — 80053 COMPREHEN METABOLIC PANEL: CPT | Performed by: INTERNAL MEDICINE

## 2017-06-29 PROCEDURE — 83615 LACTATE (LD) (LDH) ENZYME: CPT | Performed by: INTERNAL MEDICINE

## 2017-06-29 PROCEDURE — 83735 ASSAY OF MAGNESIUM: CPT | Performed by: INTERNAL MEDICINE

## 2017-06-29 PROCEDURE — 85027 COMPLETE CBC AUTOMATED: CPT | Performed by: INTERNAL MEDICINE

## 2017-06-29 PROCEDURE — 87641 MR-STAPH DNA AMP PROBE: CPT | Performed by: INTERNAL MEDICINE

## 2017-06-29 RX ORDER — SIMETHICONE 80 MG
80 TABLET,CHEWABLE ORAL 4 TIMES DAILY
Status: DISCONTINUED | OUTPATIENT
Start: 2017-06-29 | End: 2017-07-02 | Stop reason: HOSPADM

## 2017-06-29 RX ORDER — LEVETIRACETAM 750 MG/1
750 TABLET ORAL 2 TIMES DAILY
Status: DISCONTINUED | OUTPATIENT
Start: 2017-06-29 | End: 2017-07-02 | Stop reason: HOSPADM

## 2017-06-29 RX ORDER — CEFDINIR 300 MG/1
300 CAPSULE ORAL 2 TIMES DAILY
Qty: 20 CAPSULE | Refills: 0 | Status: ON HOLD | OUTPATIENT
Start: 2017-06-29 | End: 2017-07-02

## 2017-06-29 RX ORDER — MAGNESIUM OXIDE 400 MG/1
400 TABLET ORAL 2 TIMES DAILY
Status: DISCONTINUED | OUTPATIENT
Start: 2017-06-29 | End: 2017-07-02 | Stop reason: HOSPADM

## 2017-06-29 RX ORDER — CEFDINIR 300 MG/1
300 CAPSULE ORAL 2 TIMES DAILY
Status: DISCONTINUED | OUTPATIENT
Start: 2017-06-29 | End: 2017-06-29

## 2017-06-29 RX ORDER — LEVETIRACETAM 750 MG/1
750 TABLET ORAL EVERY 12 HOURS
Status: DISCONTINUED | OUTPATIENT
Start: 2017-06-29 | End: 2017-06-29 | Stop reason: HOSPADM

## 2017-06-29 RX ORDER — TAMSULOSIN HYDROCHLORIDE 0.4 MG/1
0.4 CAPSULE ORAL DAILY
Status: DISCONTINUED | OUTPATIENT
Start: 2017-06-30 | End: 2017-06-30

## 2017-06-29 RX ORDER — NALOXONE HYDROCHLORIDE 0.4 MG/ML
.1-.4 INJECTION, SOLUTION INTRAMUSCULAR; INTRAVENOUS; SUBCUTANEOUS
Status: DISCONTINUED | OUTPATIENT
Start: 2017-06-29 | End: 2017-07-02 | Stop reason: HOSPADM

## 2017-06-29 RX ORDER — PANTOPRAZOLE SODIUM 40 MG/1
40 TABLET, DELAYED RELEASE ORAL DAILY
Status: DISCONTINUED | OUTPATIENT
Start: 2017-06-30 | End: 2017-07-02 | Stop reason: HOSPADM

## 2017-06-29 RX ORDER — ASCORBIC ACID 500 MG
500 TABLET ORAL DAILY
Status: DISCONTINUED | OUTPATIENT
Start: 2017-06-30 | End: 2017-07-02 | Stop reason: HOSPADM

## 2017-06-29 RX ORDER — LANOLIN ALCOHOL/MO/W.PET/CERES
3 CREAM (GRAM) TOPICAL AT BEDTIME
Status: DISCONTINUED | OUTPATIENT
Start: 2017-06-29 | End: 2017-07-02 | Stop reason: HOSPADM

## 2017-06-29 RX ORDER — FINASTERIDE 5 MG/1
5 TABLET, FILM COATED ORAL DAILY
Status: DISCONTINUED | OUTPATIENT
Start: 2017-06-30 | End: 2017-07-02 | Stop reason: HOSPADM

## 2017-06-29 RX ORDER — LOSARTAN POTASSIUM 25 MG/1
25 TABLET ORAL DAILY
Status: DISCONTINUED | OUTPATIENT
Start: 2017-06-30 | End: 2017-06-30

## 2017-06-29 RX ORDER — ALLOPURINOL 100 MG/1
100 TABLET ORAL DAILY
Status: DISCONTINUED | OUTPATIENT
Start: 2017-06-30 | End: 2017-07-02 | Stop reason: HOSPADM

## 2017-06-29 RX ORDER — LANOLIN ALCOHOL/MO/W.PET/CERES
100 CREAM (GRAM) TOPICAL DAILY
Status: DISCONTINUED | OUTPATIENT
Start: 2017-06-30 | End: 2017-07-02 | Stop reason: HOSPADM

## 2017-06-29 RX ORDER — ACETAMINOPHEN 325 MG/1
650 TABLET ORAL EVERY 4 HOURS PRN
Status: DISCONTINUED | OUTPATIENT
Start: 2017-06-29 | End: 2017-07-02 | Stop reason: HOSPADM

## 2017-06-29 RX ORDER — ATORVASTATIN CALCIUM 10 MG/1
10 TABLET, FILM COATED ORAL
Status: DISCONTINUED | OUTPATIENT
Start: 2017-06-29 | End: 2017-07-02 | Stop reason: HOSPADM

## 2017-06-29 RX ORDER — CEFTAZIDIME 1 G/1
1 INJECTION, POWDER, FOR SOLUTION INTRAMUSCULAR; INTRAVENOUS EVERY 12 HOURS
Status: DISCONTINUED | OUTPATIENT
Start: 2017-06-29 | End: 2017-06-30

## 2017-06-29 RX ORDER — AMOXICILLIN 250 MG
1-2 CAPSULE ORAL 2 TIMES DAILY PRN
Status: DISCONTINUED | OUTPATIENT
Start: 2017-06-29 | End: 2017-07-02 | Stop reason: HOSPADM

## 2017-06-29 RX ORDER — OXYCODONE HYDROCHLORIDE 5 MG/1
5 TABLET ORAL EVERY 4 HOURS PRN
Status: DISCONTINUED | OUTPATIENT
Start: 2017-06-29 | End: 2017-07-02 | Stop reason: HOSPADM

## 2017-06-29 RX ORDER — CEFTRIAXONE 1 G/1
1 INJECTION, POWDER, FOR SOLUTION INTRAMUSCULAR; INTRAVENOUS ONCE
Status: COMPLETED | OUTPATIENT
Start: 2017-06-29 | End: 2017-06-29

## 2017-06-29 RX ADMIN — LEVETIRACETAM 750 MG: 750 TABLET, FILM COATED ORAL at 23:03

## 2017-06-29 RX ADMIN — CEFTRIAXONE 1 G: 1 INJECTION, POWDER, FOR SOLUTION INTRAMUSCULAR; INTRAVENOUS at 00:54

## 2017-06-29 RX ADMIN — ATORVASTATIN CALCIUM 10 MG: 10 TABLET, FILM COATED ORAL at 23:03

## 2017-06-29 RX ADMIN — SIMETHICONE CHEW TAB 80 MG 80 MG: 80 TABLET ORAL at 23:03

## 2017-06-29 RX ADMIN — MELATONIN TAB 3 MG 3 MG: 3 TAB at 23:03

## 2017-06-29 RX ADMIN — CEFTAZIDIME 1 G: 1 INJECTION, POWDER, FOR SOLUTION INTRAMUSCULAR; INTRAVENOUS at 23:03

## 2017-06-29 RX ADMIN — KETOTIFEN FUMARATE 1 DROP: 0.35 SOLUTION/ DROPS OPHTHALMIC at 23:04

## 2017-06-29 RX ADMIN — MAGNESIUM OXIDE TAB 400 MG (241.3 MG ELEMENTAL MG) 400 MG: 400 (241.3 MG) TAB at 23:03

## 2017-06-29 RX ADMIN — LEVETIRACETAM 750 MG: 750 TABLET, FILM COATED ORAL at 01:13

## 2017-06-29 RX ADMIN — ACETAMINOPHEN 650 MG: 325 TABLET, FILM COATED ORAL at 21:27

## 2017-06-29 ASSESSMENT — ACTIVITIES OF DAILY LIVING (ADL)
RETIRED_EATING: 4-->COMPLETELY DEPENDENT
TRANSFERRING: 4-->COMPLETELY DEPENDENT
SWALLOWING: 0-->SWALLOWS FOODS/LIQUIDS WITHOUT DIFFICULTY
AMBULATION: 4-->COMPLETELY DEPENDENT
TOILETING: 4-->COMPLETELY DEPENDENT
BATHING: 4-->COMPLETELY DEPENDENT
COGNITION: 0 - NO COGNITION ISSUES REPORTED
DRESS: 4-->COMPLETELY DEPENDENT
RETIRED_COMMUNICATION: 0-->UNDERSTANDS/COMMUNICATES WITHOUT DIFFICULTY
FALL_HISTORY_WITHIN_LAST_SIX_MONTHS: NO

## 2017-06-29 ASSESSMENT — PAIN DESCRIPTION - DESCRIPTORS: DESCRIPTORS: HEADACHE

## 2017-06-29 NOTE — DISCHARGE INSTRUCTIONS
TODAY'S VISIT:  You were seen today for blood tinged urine and discomfort with urination, and were found to have a urinary tract infection but your vital signs and the remainder of your blood tests were appropriate.  - We discussed antibiotics for you with the Pharmacist. The choice of antibiotic is somewhat difficult, given your need for the blood thinning medication, warfarin, and balancing the need for that medication as well as not making your blood too thin with additional antibiotics.  Because of this, we had to choose an antibiotic does not always treat all urinary tract infections completely, and so it is very important that if symptoms do not improve after 2 days, that you may need to be started on a different antibiotic and you should discuss this with a medical provider. Please schedule a follow-up appointment with your usual providers for Friday to discuss this.     FOLLOW-UP:  Please make an appointment to follow up with:  - Your usual Primary Care Provider and Cardiology/ LVAD teams (phone: [phone]) as soon as possible.   --- We think you should be seen within 1-2 days to ensure you are continuing to improve.   - You should also follow-up with the Urology Clinic (phone: [phone]) as previously planned.       PRESCRIPTIONS / MEDICATIONS:  - Please take the prescribed antibiotic until completed or told otherwise by a medical provider.     RETURN TO THE EMERGENCY DEPARTMENT  Return to the Emergency Department at any time for new/worsening symptoms.         Urinary Tract Infections in Men  Urinary tract infections (UTIs) are most often caused by bacteria (germs) that invade the urinary tract. The bacteria may come from outside the body. Or they may travel from the skin outside of rectum into the urethra. Pain in or around the urinary tract is a common symptom for most UTIs. But the only way to know for sure if you have a UTI is to have a urinalysis and urine culture.     Four Types of  UTIs    Cystitis: A bladder infection, or cystitis, is often linked to a blockage from an enlarged prostate. You may have an urgent or frequent need to urinate, and bloody urine. Treatment includes antibiotics and medications to relax or shrink the prostate. In some cases, surgery is needed.    Urethritis: This is an infection of the urethra. You may have a discharge from the urethra or burning when you urinate.You may also have pain in the urethra or penis. Urethritis is treated with antibiotics.    Prostatitis: This is an inflammation or infection of the prostate. You may have an urgent or frequent need to urinate, fever, or burning when you urinate. Or you may have a tender prostate, or a vague feeling of pressure. Prostatitis is treated with a range of medications, depending on the cause.    Pyelonephritis: This is a kidney infection. If not treated, it can be serious and damage your kidneys. In severe cases you may be hospitalized. You may have a fever and upper back pain.  Treating a UTI    Medications: Most UTIs are treated with antibiotics. These kill the bacteria. The length of time you need to take them depends on the type of infection. Take antibiotics exactly as directed until all of the medication is gone. If you do not, the infection may not go away and may become harder to treat. For certain types of UTIs, you may be given other medications to help treat your symptoms.    Lifestyle changes: The lifestyle changes below will help get rid of your current infection. They may also help prevent future UTIs.    Drink plenty of fluids such as water, juice, or other caffeine-free drinks. This helps flush bacteria out of your system.    Empty your bladder when you feel the urge to urinate and before going to sleep. Urine that stays in your bladder promotes infection.    Use condoms during sex. These help prevent UTIs caused by sexually transmitted bacteria.    Keep follow-up appointments with your health care  provider. He or she can may do tests to make sure the infection has cleared. If necessary, additional treatment can be started.    Additional treatment: Most UTIs respond to medication. But sometimes a procedure or surgery is needed. This can treat an enlarged prostate, or remove a kidney stone or other blockage. Surgery may also treat problems caused by scarring or long-term infections.    5240-6221 [employer]. [address] [place] [address], [place] [address]. All rights reserved. This information is not intended as a substitute for professional medical care. Always follow your healthcare professional's instructions.        Blood in the Urine    Blood in the urine (hematuria) has many possible causes. If it occurs after an injury (such as a car accident or fall), it is most often a sign of bruising to the kidney or bladder. Common causes of blood in the urine include urinary tract infections, kidney stones, inflammation, tumors, or certain other diseases of the kidney or bladder. Menstruation can cause blood to appear in the urine sample, although it is not coming from the urinary tract.  If only a trace amount of blood is present, it will show up on the urine test, even though the urine may be yellow and not pink or red. This may occur with any of the above conditions, as well as heavy exercise or high fever. In this case, your doctor may want to repeat the urine test on another day. This will show if the blood is still present. If it is, then other tests can be done to find out the cause.  Home care  Follow these home care guidelines:    If your urine does not appear bloody (pink, brown or red) then you do not need to restrict your activity in any way.    If you can see blood in your urine, rest and avoid heavy exertion until your next exam. Do not use aspirin, blood thinners, or anti-platelet or anti-inflammatory medicines. These include ibuprofen and naproxen. These thin the blood and may increase  bleeding.  Follow-up care  Follow up with your healthcare provider, or as advised. If you were injured and had blood in your urine, you should have a repeat urine test in 1 to 2 days. Contact your doctor for this test.  A radiologist will review any X-rays that were taken. You will be told of any new findings that may affect your care.  When to seek medical advice  Call your healthcare provider right away if any of these occur:    Bright red blood or blood clots in the urine (if you did not have this before)    Weakness, dizziness or fainting    New groin, abdominal, or back pain    Fever of 100.4 F (38 C) or higher, or as directed by your healthcare provider    Repeated vomiting    Bleeding from the nose or gums or easy bruising  Date Last Reviewed: 9/1/2016 2000-2017 [employer]. [address] [place] [address], [place] [address]. All rights reserved. This information is not intended as a substitute for professional medical care. Always follow your healthcare professional's instructions.

## 2017-06-29 NOTE — IP AVS SNAPSHOT
[address] [address]    Phone:  [phone]                                       After Visit Summary   6/29/2017    Sohan Rendon    MRN: [id number]           After Visit Summary Signature Page     I have received my discharge instructions, and my questions have been answered. I have discussed any challenges I see with this plan with the nurse or doctor.    ..........................................................................................................................................  Patient/Patient Representative Signature      ..........................................................................................................................................  Patient Representative Print Name and Relationship to Patient    ..................................................               ................................................  Date                                            Time    ..........................................................................................................................................  Reviewed by Signature/Title    ...................................................              ..............................................  Date                                                            Time

## 2017-06-29 NOTE — ED PROVIDER NOTES
"  History     Chief Complaint   Patient presents with     Hematuria     The history is provided by a relative. A  was used.   Per request of patient, HPI was interpreted by a relative, and pt does speak some English.    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 Per review of his chart, he was recently admitted in the hospital from 05/02/17 to 05/14/17 for right-sided abdominal pain secondary to acalculous cholecystitis and was supposed to follow up with Dr. Meier for evaluation of recurrent hematuria, but the patient admitted he forgot about the appointment.     Patient's relative states he had been doing well w/o hematuria, until this afternoon when he began having some blood tinged urine around 3:30 PM with associated discomfort \"in his penis\" when he urinates. He also has complaints of general dysuria. No scrotal/testicular symptoms. No penile/ skin changes. No abdominal, back or flank pain.  He otherwise denies fever, chills, nausea, vomiting, diarrhea, constipation, melena or BRBPR. No new weakness, no LH, syncope/near syncope. Other than the change in urine appearance and discomfort in the penis when urinating no other new symptoms, changes or concerns. See ROS for further details.     PAST MEDICAL HISTORY  Past Medical History:   Diagnosis Date     Acute right MCA stroke (H) 6/2013    With L sided hemiparesis      Anemia of chronic disease     Baseline Hb 8-9     BPH (benign prostatic hyperplasia)      CHF (congestive heart failure), NYHA class IV (H) 10/9/2012    s/p HeartMate II.  Was on milrinone and dobutamine prior to LVAD      CKD (chronic kidney disease)     Baseline Cr=     Clostridium difficile colitis 12/2012      Dysphagia     PEG tube placed in " 2012.  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  Normal carotid duplex 3/2014.       Esophagitis 12/2012    EGD with esophagitis and multiple douenal ulcers     Fracture of femoral neck, right, closed (H) 2/3/2015    Presumed pathologic as patient is non-weight-bearing and suffered no trauma.  Family declined operative repair during hospital stay 1/23 - 1/30/15.     Gastric and duodenal angiodysplasia with hemorrhage 6/18/2015     GERD (gastroesophageal reflux disease)      Gout      HTN (hypertension)     LDL=59 (3/29/2014)     Hyperlipaemia      Ischemic cardiomyopathy      Mitral regurgitation     s/p MVR with Carbomedics ring      Myocardial infarction (H) 1998    In [facility] without intervention      Nonsenile cataract     BE     PFO (patent foramen ovale)     s/p closure (10/9/2012)     TB lung, latent     s/p 9 months INH in 2012      PAST SURGICAL HISTORY  Past Surgical History:   Procedure Laterality Date     C CABG, VEIN, FIVE  2001     CATARACT IOL, RT/LT Right 11/19/2015     ENDOSCOPIC RETROGRADE CHOLANGIOPANCREATOGRAM N/A 5/9/2017    Procedure: COMBINED ENDOSCOPIC RETROGRADE CHOLANGIOPANCREATOGRAPHY, PLACE TUBE/STENT;  Endoscopic Retrograde Cholangiopancreatogram, Stent (Zimmon Biliary 7fr 12cm) Placement;  Surgeon: Cristo Grove MD;  Location: [facility] OR     ESOPHAGOSCOPY, GASTROSCOPY, DUODENOSCOPY (EGD), COMBINED N/A 6/10/2015    Procedure: COMBINED ESOPHAGOSCOPY, GASTROSCOPY, DUODENOSCOPY (EGD);  Surgeon: Edu Jenkins MD;  Location: [facility] GI     ESOPHAGOSCOPY, GASTROSCOPY, DUODENOSCOPY (EGD), COMBINED N/A 6/15/2015    Procedure: COMBINED ESOPHAGOSCOPY, GASTROSCOPY, DUODENOSCOPY (EGD);  Surgeon: Yury Bonner MD;  Location: [facility] OR     H INSERT ICD  2/10/2011    And pacemaker.  Not BiV     INSERT VENTRICULAR ASSIST DEVICE LEFT (HEARTMATE II)  10/9/2012     IR GASTRO JEJUNOSTOMY TUBE PLACEMENT        PHACOEMULSIFICATION CLEAR CORNEA WITH STANDARD INTRAOCULAR LENS IMPLANT Right 11/19/2015    Procedure: PHACOEMULSIFICATION CLEAR CORNEA WITH STANDARD INTRAOCULAR LENS IMPLANT;  Surgeon: Violeta Cosme MD;  Location: [facility] OR     REPAIR PATENT FORAMEN OVALE  10/9/2012     REPAIR VALVE MITRAL  2/9/2012    28 mm Carbomedics 2/3 ring      FAMILY HISTORY  Family History   Problem Relation Age of Onset     Family History Negative No family hx of      Glaucoma No family hx of      Macular Degeneration No family hx of      CANCER No family hx of      no skin cancer     SOCIAL HISTORY  Social History   Substance Use Topics     Smoking status: Former Smoker     Smokeless tobacco: Former User     Alcohol use No     MEDICATIONS  No current facility-administered medications for this encounter.      Current Outpatient Prescriptions   Medication     ketotifen (ZADITOR/REFRESH ANTI-ITCH) 0.025 % SOLN ophthalmic solution     ASPIRIN NOT PRESCRIBED (INTENTIONAL)     SENEXON-S 8.6-50 MG per tablet     metoprolol (TOPROL-XL) 25 MG 24 hr tablet     metoprolol (TOPROL-XL) 50 MG 24 hr tablet     bisacodyl (DULCOLAX) 10 MG Suppository     magnesium oxide (MAG-OX) 400 MG tablet     finasteride (PROSCAR) 5 MG tablet     atorvastatin (LIPITOR) 10 MG tablet     warfarin (COUMADIN) 3 MG tablet     calcium carbonate-vitamin D 500-400 MG-UNIT TABS per tablet     simethicone (MYLICON) 80 MG chewable tablet     oxyCODONE (ROXICODONE) 5 MG IR tablet     melatonin 3 MG tablet     Thiamine HCl (VITAMIN B-1) 100 MG TABS     order for DME     guaiFENesin-dextromethorphan (ROBITUSSIN DM) 100-10 MG/5ML syrup     losartan (COZAAR) 25 MG tablet     allopurinol (ZYLOPRIM) 100 MG tablet     order for DME     pantoprazole (PROTONIX) 40 MG enteric coated tablet     tamsulosin (FLOMAX) 0.4 MG 24 hr capsule     order for DME     order for DME     loratadine (CLARITIN) 10 MG tablet     levETIRAcetam (KEPPRA) 750 MG tablet     ascorbic acid (VITAMIN C) 500 MG  tablet     blood glucose monitoring (NO BRAND SPECIFIED) test strip     order for DME     nystatin (MYCOSTATIN) 268719 UNIT/GM POWD     order for DME     nitroglycerin (NITROSTAT) 0.4 MG SL tablet     blood glucose monitoring (NO BRAND SPECIFIED) lancets     ORDER FOR DME     acetaminophen (TYLENOL) 325 MG tablet     bisacodyl (DULCOLAX) 5 MG EC tablet     ALLERGIES  Allergies   Allergen Reactions     Seasonal Allergies        I have reviewed the Medications, Allergies, Past Medical and Surgical History, and Social History in the Epic system.    Review of Systems   Constitutional: Negative for chills and fever.   Cardiovascular: Negative for chest pain.   Gastrointestinal: Negative for abdominal pain, blood in stool, constipation, diarrhea, nausea and vomiting.   Endocrine: Negative for polydipsia and polyuria.   Genitourinary: Positive for dysuria, hematuria and penile pain (when urinating). Negative for flank pain, frequency, penile swelling, scrotal swelling and testicular pain.   Musculoskeletal: Negative for back pain.   Skin: Negative for color change and rash.   Allergic/Immunologic: Positive for environmental allergies.   Neurological: Negative for syncope and headaches.        No new weakness   Hematological: Bruises/bleeds easily (on blood thinners).   All other systems reviewed and are negative.      Physical Exam   BP: (!) 104/94  Pulse: 77  Temp: 97.1  F (36.2  C)  Resp: 18  SpO2: 100 %  Physical Exam   CONSTITUTIONAL: Well-developed and well-nourished. Awake and alert. Non-toxic appearance. No acute distress.   HENT:   - Head: Normocephalic and atraumatic.   - Ears: Hearing and external ear grossly normal.   - Nose: Nose normal. No rhinorrhea. No epistaxis.   - Mouth/Throat: Oropharynx is clear and MMM  EYES: Conjunctivae and lids are normal. N  NECK: Normal range of motion and phonation normal. Neck supple.  No tracheal deviation, no stridor. No edema or erythema noted.  CARDIOVASCULAR: LVAD in place,  associated noises present  PULMONARY/CHEST: Effort normal. No accessory muscle usage or stridor. No respiratory distress.  No appreciable abnormal breath sounds.  ABDOMEN: Soft, non-distended. No tenderness on palpation. No flank/CVA tendernss.. No rigidity, rebound or guarding.   MUSCULOSKELETAL: Extremities warm and seemingly well perfused. No edema or calf tenderness.  NEUROLOGIC: Awake, alert. Not disoriented.  No seizure activity. GCS 15. Chronic/known hemiparesis after previous stroke  SKIN: Skin is warm and dry. No rash noted. No diaphoresis. No pallor.   PSYCHIATRIC: Normal mood and affect. Speech and behavior normal. Thought processes linear. Cognition and memory are normal.       ED Course       ED Course   Comment By Time   Discussed case with IM, and Cardiology attendings.  No clear admission criteria met per their thoughts & they recommend treating and outpatient management with close follow-up. Nohelia Arellano MD 06/29 0144   Had long discussion with Pharmacist given the patient's medical comorbidities, anticoagulation with warfarin with significant need for anticoagulation given his LVAD status.  We discussed the possibility of Bactrim or 4 quinolones and they recommended actually to try a course of cephalosporin pregnancy Ceftin here) with very strict instructions that if his symptoms are not improving after 2 days that he would need to be reevaluated, potentially even admitted for IV antibiotics and closer monitoring of his INR levels. Nohelia Arellano MD 06/29 0155     Procedures   7:54 PM  The patient was seen and examined by Dr. Arellano in Room 14.         Assessments & Plan (with Medical Decision Making)   IMPRESSION: 67-year-old male, former smoker, LVAD patient (anticoagulated with warfarin), with previous right-sided MCA stroke with subsequent/ongoing left-sided hemiparesis, additional hx for DM, HTN, HLP, known CKD, BPH w/ previous urinary retention, et al., whom I last saw in the ED on  May 2nd (most recent ED visit) and admitted with abdominal discomfort, found to have acute acalculous cholecystitis with biliary obstruction, ileus, duodenitis with plans to follow up with urine cytology/FISH ordered by Urology as he was having recurrent hematuria while in hospital, with the plan to follow up with Dr. Meier in Urology (D/C'd w/ truong in place) as an outpatient.  Unfortunately, per EMR review patient no-showed for his Uro appointment on 6/22, sounds as though they forgot about that appointment amongst all of his other medical appointments.      Pt presents tonight with recurrent hematuria/blood tingued urine, though vitals WNL and looks well/nontoxic on exam.  No other symptoms for LH, Dizziness or sx's for hypovolemia/acute anemia. Abdominal exam benign. Urine is blood tinged and not timbo clot.  DDX includes but not limited to recurrent spontaneous bleeding in the setting of anticoagulation, bleeding related to BPH or other such cause, cystitis, urologic malignancy, et al. No abdominal, back or flank pain for renal stone or pyelonephritis.  Thankfully, given lack of systemic symptomatology, appearance of the urine, and current vital signs, does not appear that he is having any emergent amount of bleeding, and clinically does not appear septic.     PLAN: We'll get laboratory studies to ensure no significant amount of blood loss, appropriate renal function, and sure not supratherapeutic on anticoagulation.  We'll get bladder scan to ensure that he is not retaining.  That he may need Truong catheter placement for irrigation bases having the discomfort with the urination as he sounds to be potentially even straining with some clots, will ensure no retention with PVR assessment. I think he'll likely need to follow up with Urology regardless as previously planned.     RESULTS:  See ED Course section above for particular pertinent findings and comments  - Labs: No significant changes. No leukocytosis,  renal fx unchanged. Normal lactate.   - Urine:   --- Bladder Scan: No retained urine after voiding, no obvious findings for retention, no fullness on clinical exam  --- UA/Micro: Does appear to have UTI    INTERVENTIONS:   - IV Ceftriaxone    RE-EVALUATION:  See ED Course section above for particular pertinent findings and comments  - Pt continued to look well, vitals remain WNL.     DISCUSSIONS:  - Discussed w/ IM who recommended outpatient management, discussed w/ ED Obs who cannot take LVAD pt's and recommend discussing with Cardiology.   - w/ Cardiology: Attending quite familiar w/ pt. They recommend outpatient management given no change in labs, hemodynamically appropriate, etc.  - w/ Pharmacy: Difficult choice in Abx. After discussion of the pt's case, medications, renal fx, etc. Need for anticoagulation given LVAD, while not perfect they recommend cefdinir, but recommend if not improving in 2 days may need to come in to hospital for IV Abx, close INR monitoring.   - w/ Patient: I have reviewed the available findings, plan (and discussions with IM/Cards, Pharmacy), need for close follow up (in 24, NLT 48 hrs), and strict return instructions with the patient/his family. They expressed understanding and agreement with this plan. All questions answered to the best of our ability at this time.     DISPOSITION/PLANNING:  - FINAL IMPRESSION: Hematuria/blood tinged urine, UTI (w/o symptoms or pyelo)  - DISPOSITION: D/C to home  --- Follow-up: with usual teams, PCP, Urology, LVAD teams. Needs to be re-evaluated w/in 1-2 days.   --- Pending: Urine culture  --- Recommendations: Conservative symptom management, close F/U, strict return instructions.   --- Rx: Cefdinir, though may need to be changed.       ______________________________________________________________________________    - I have reviewed the available nursing notes.      New Prescriptions    No medications on file       Final diagnoses:   None     I,  Gavin Richard, am serving as a trained medical scribe to document services personally performed by Nohelia Arellano MD, based on the provider's statements to me.      I, Nohelia Arellano MD, was physically present and have reviewed and verified the accuracy of this note documented by Gavin Richard.    6/28/2017   [facility], [place], EMERGENCY DEPARTMENT     Nohelia Arellano MD  06/30/17 1142

## 2017-06-29 NOTE — PROGRESS NOTES
Sohan was diagnosed with a UTI and started on Omnicef.  According to micromedex this can increase the risk of bleeding.  Pharmacist is consulted and no change in warfarin dose is recommended.  INR check on 7/3. Almaz is given plan of care and verbalizes understanding.

## 2017-06-29 NOTE — MR AVS SNAPSHOT
[facility] [place]   6/29/2017   Anticoagulation Therapy Visit    Description:  67 year old male   Provider:  Blanca Bah, RN   Department:  [facility]           INR as of 6/29/2017     Today's INR       Anticoagulation Summary as of 6/29/2017     INR goal    Prior goal 1.8-2.5   Today's INR    Next INR check     Indications   LVAD (left ventricular assist device) present [Z95.811]  Long-term (current) use of anticoagulants [Z79.01] [Z79.01]         June 2017 Details    Sun Mon Tue Wed Thu Fri Sat         1               2               3                 4               5               6               7               8               9               10                 11               12               13               14               15               16               17                 18               19               20               21               22               23               24                 25               26      4.5 mg   See details      27      6 mg         28      4.5 mg         29      6 mg         30      4.5 mg           Date Details   06/26 This INR check               How to take your warfarin dose     To take:  4.5 mg Take 1.5 of the 3 mg tablets.    To take:  6 mg Take 2 of the 3 mg tablets.           July 2017 Details    Sun Mon Tue Wed Thu Fri Sat           1      6 mg           2      6 mg         3            4               5               6               7               8                 9               10               11               12               13               14               15                 16               17               18               19               20               21               22                 23               24               25               26               27               28               29                 30               31                     Date Details   No additional details    Date of next INR:  7/3/2017         How to take  your warfarin dose     To take:  6 mg Take 2 of the 3 mg tablets.

## 2017-06-29 NOTE — IP AVS SNAPSHOT
MRN:[id number]                      After Visit Summary   6/29/2017    Sohan Rendon    MRN: [id number]           Thank you!     Thank you for choosing [facility] for your care. Our goal is always to provide you with excellent care. Hearing back from our patients is one way we can continue to improve our services. Please take a few minutes to complete the written survey that you may receive in the mail after you visit with us. Thank you!        Patient Information     Date Of Birth          1950        Designated Caregiver       Most Recent Value    Caregiver    Will someone help with your care after discharge? yes    Name of designated caregiver Almaz    Phone number of caregiver [phone]    Caregiver address see facesheet [see facesheet]      About your hospital stay     You were admitted on:  June 29, 2017 You last received care in the:  [address] [facility]    You were discharged on:  July 2, 2017        Reason for your hospital stay       Sepsis secondary to urinary tract infection                  Who to Call     For medical emergencies, please call 911.  For non-urgent questions about your medical care, please call your primary care provider or clinic, [phone]          Attending Provider     Provider Specialty    Dafne Aviles MD Cardiology       Primary Care Provider Office Phone # Fax #    Thomas Dill [phone]      After Care Instructions     Activity       Your activity upon discharge: activity as tolerated            Diet       Follow this diet upon discharge: Cardiac                  Follow-up Appointments     Adult [facility] Follow-up and recommended labs and tests       1. PCP within 1-2 weeks  2. VAD clinic within 1-2 weeks  3. Coumadin clinic Tuesday, 7/4/2017    Appointments on [place] and/or [facility] (with [facility] or [facility] provider or service). Call [phone] if you haven't heard regarding these appointments within 7 days  of discharge.            Follow Up and recommended labs and tests       1. Follow up with coumadin clinic 7/4/2017; obtain INR  2. 7/4/2017, BMP                  Your next 10 appointments already scheduled     Jul 18, 2017  2:00 PM CDT   Return Visit with Thomas Dill MD   [facility]    [address]  [address]  [facility] [address]   [phone]            Aug 09, 2017  9:30 AM CDT   Lab with  LAB   [facility] ([facility])    [address]  1st Floor  [place] [facility] [address]   [phone]            Aug 09, 2017 10:00 AM CDT   (Arrive by 9:45 AM)   Ventricular Assist Device with Vicky Ziegler NP   [facility] ([facility])    [address]  3rd Floor  [place] [facility] [address]   [phone]            Aug 15, 2017  2:00 PM CDT   Return Visit with Thomas Dill MD   [facility] ([facility])    [address]  [address]  [facility] [address]   [phone]            Sep 12, 2017  2:00 PM CDT   Return Visit with Thomas Dill MD   [facility] ([facility])    [address]  [address]  [facility] [address]   [phone]            Oct 10, 2017  2:00 PM CDT   Return Visit with Thomas Dill MD   [facility] ([facility])    [address]  [address]  [facility] [address]   [phone]              Additional Services     Home care nursing referral       Please fax discharge orders to [facility]    Ph:  [phone]    Fax: [phone]    Skilled home care RN for resumption of home care services as prior to admission and to assist with the MD team plans of care as outlined in discharge orders.    Skilled home care RN to evaluate medication management, nutrition and hydration evaluation, endurance evaluation, and  "general status evaluation after discharge from the acute care hospital setting.                  Future tests that were ordered for you     Basic metabolic panel           INR                 Warfarin Instruction     You have started taking a medicine called warfarin. This is a blood-thinning medicine (anticoagulant). It helps prevent and treat blood clots.      Before leaving the hospital, make sure you know how much to take and how long to take it.      You will need regular blood tests to make sure your blood is clotting safely. It is very important to see your doctor for regular blood tests.    Talk to your doctor before taking any new medicine (this includes over-the-counter drugs and herbal products). Many medicines can interact with warfarin. This may cause more bleeding or too much clotting.     Eating a lot of vitamin K--found in green, leafy vegetables--can change the way warfarin works in your body. Do NOT avoid these foods. Instead, try to eat the same amount each day.     Bleeding is the most common side-effect of warfarin. You may notice bleeding gums, a bloody nose, bruises and bleeding longer when you cut yourself. See a doctor at once if:   o You cough up blood  o You find blood in your stool (poop)  o You have a deep cut, or a cut that bleeds longer than 10 minutes   o You have a bad cut, hard fall, accident or hit your head (go to urgent care or the emergency room).    For women who can get pregnant: This medicine can harm an unborn baby. Be very careful not to get pregnant while taking this medicine. If you think you might be pregnant, call your doctor right away.    For more information, read \"Guide to Warfarin Therapy,  the booklet you received in the hospital.        General Recommendations To Control Heart Failure When You Get Home     Instructions To Patients and Families: Please read and check off each of these important instructions as you do them when you get home.           Weight and " "symptoms      ___ Put a scale in your bathroom  ___ Post a weight chart or calendar next to the scale  ___Weigh yourself every day as soon as you you get up in the morning. You should only be wearing your pajamas. Write your weight on the chart/calendar.  ___ Bring your weight chart/calendar with you to all appointments    ___Call your doctor if you gain 2 pounds in 1 day or 5 pounds in 1 week from your \"dry\" weight (baseline weight). Also call your doctor if you have shortness of breath that gets worse over time, leg swelling or fatigue.         Medicines and diet     ___ Make sure to take your medicines as prescribed.    ___Bring a current list of your medicines and all of your medicine bottles with you to all appointments.    ___ Limit fluids if you still have swelling or shortness of breath, or if your doctor tells you to do so.  ___ Eat less than 2000 mg of sodium (salt) every day. Read food labels, and do not add salt to meals.   ___ Heart healthy diet with low fat and low cholesterol          Activity and suggested lifestyle changes    ___ Stay active. Talk to your doctor about an exercise program that is safe for your heart.    ___ Stop smoking. Reduce alcohol use.      ___ Lose weight if you are overweight. Extra weight puts a lot of stress on the heart.          Control for Leg Swelling   ___ Keep your legs elevated (raised) as needed for swelling. If swelling is uncomfortable or elevation doesn t help, ask your doctor about using ACE wrap or Jobst stockings.          Follow-up appointments   ___ Make a [facility] appointment with a basic metabolic panel lab draw 3 to 5 days after you leave the hospital. Call one of the following locations:   [facility]  [phone],  [facility] [phone],  [facility]  [phone].     ___ Make sure to take your medications as prescribed and bring an accurate list of your " "medications and your weight chart/calendar to your follow up appointment at the [facility] for continued education and adjustments          What is the CORE clinic?    The [facility].O.R.[facility] (Cardiomyopathy, Optimization, Rehabilitation, Education) Clinic is a heart failure specialty clinic within the [facility]. At [facility].R[facility] you will work with nurse practitioners to carefully adjust medicines, get education and learn who and when to call if symptoms appear. [facility] nurses specialize in helping you:    better understand your disease.    slow the progress of your disease.    improve the length and quality of your life.    detect future heart problems before they become life threatening.    avoid hospital stays.            Pending Results     Date and Time Order Name Status Description    6/30/2017 0915 Blood culture Preliminary     6/30/2017 0915 Blood culture Preliminary     6/30/2017 0739 EKG 12-lead, complete Preliminary             Statement of Approval     Ordered          07/02/17 7605  I have reviewed and agree with all the recommendations and orders detailed in this document.  EFFECTIVE NOW     Approved and electronically signed by:  Avtar Alvarez MD             Admission Information     Date & Time Provider Department Dept. Phone    6/29/2017 Dafne Aviles MD [address] [facility] [phone]      Your Vitals Were     Blood Pressure Pulse Temperature Respirations Weight Pulse Oximetry    97/80 (BP Location: Left arm) 77 97.7  F (36.5  C) (Oral) 16 80.4 kg (177 lb 4.8 oz) 100%    BMI (Body Mass Index)                   26.96 kg/m2           MyChart Information     [facility] lets you send messages to your doctor, view your test results, renew your prescriptions, schedule appointments and more. To sign up, go to [email or web address] . Click on \"Log in\" on the left side of the screen, which will take you to the Welcome page. Then click on \"Sign up Now\" on " the right side of the page.     You will be asked to enter the access code listed below, as well as some personal information. Please follow the directions to create your username and password.     Your access code is: [email or web address]  Expires: 2017  8:55 AM     Your access code will  in 90 days. If you need help or a new code, please call your [facility] clinic or [phone].        Care EveryWhere ID     This is your Care EveryWhere ID. This could be used by other organizations to access your [facility] medical records  [id number]        Equal Access to Services     [facility]: Hadalee Rahman, jeana morrison, otto rose, willie dorman . So [facility] [phone].    ATENCIÓN: Si habla español, tiene a smith disposición servicios gratuitos de asistencia lingüística. Dayron al [phone].    We comply with applicable federal civil rights laws and [place] laws. We do not discriminate on the basis of race, color, national origin, age, disability sex, sexual orientation or gender identity.               Review of your medicines      START taking        Dose / Directions    * sulfamethoxazole-trimethoprim 400-80 MG per tablet   Commonly known as:  BACTRIM/SEPTRA        Dose:  1 tablet   Take 1 tablet by mouth 2 times daily   Quantity:  22 tablet   Refills:  0       * sulfamethoxazole-trimethoprim 400-80 MG per tablet   Commonly known as:  BACTRIM/SEPTRA        Dose:  1 tablet   Take 1 tablet by mouth 2 times daily   Quantity:  22 tablet   Refills:  0       * Notice:  This list has 2 medication(s) that are the same as other medications prescribed for you. Read the directions carefully, and ask your doctor or other care provider to review them with you.      CONTINUE these medicines which may have CHANGED, or have new prescriptions. If we are uncertain of the size of tablets/capsules you have at home, strength may be listed as something that might have  changed.        Dose / Directions    acetaminophen 325 MG tablet   Commonly known as:  TYLENOL   This may have changed:    - when to take this  - reasons to take this   Used for:  Femoral neck fracture, right, closed, initial encounter        Dose:  650 mg   Take 2 tablets (650 mg) by mouth every 6 hours   Quantity:  100 tablet   Refills:  0       metoprolol 25 MG 24 hr tablet   Commonly known as:  TOPROL-XL   This may have changed:  Another medication with the same name was removed. Continue taking this medication, and follow the directions you see here.   Used for:  LVAD (left ventricular assist device) present (H)        TAKE 1 TABLET (25 MG) BY MOUTH EVERY EVENING   Quantity:  90 tablet   Refills:  3       warfarin 3 MG tablet   Commonly known as:  COUMADIN   This may have changed:  additional instructions   Used for:  Cerebrovascular accident (CVA) due to embolism of right middle cerebral artery (H)        Take 3mgtonight and tomorrow night. 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, unspecified site        Dose:  100 mg   Take 1 tablet (100 mg) by mouth daily   Quantity:  90 tablet   Refills:  3       ascorbic acid 500 MG tablet   Commonly known as:  VITAMIN C        Dose:  500 mg   Take 1 tablet (500 mg) by mouth daily With iron pill   Quantity:  90 tablet   Refills:  3       ASPIRIN NOT PRESCRIBED   Commonly known as:  INTENTIONAL   Used for:  LVAD (left ventricular assist device) present (H)        Please choose reason not prescribed, below   Quantity:  0 each   Refills:  0       atorvastatin 10 MG tablet   Commonly known as:  LIPITOR   Used for:  Hyperlipidemia LDL goal <100        TAKE 1 TABLET (10 MG) BY MOUTH DAILY   Quantity:  90 tablet   Refills:  3        * bisacodyl 5 MG EC tablet   Commonly known as:  DULCOLAX   Used for:  Constipation        Dose:  5 mg   Take 1 tablet (5 mg) by mouth daily as needed for constipation   Quantity:  20 tablet   Refills:  1       * bisacodyl 10 MG Suppository   Commonly known as:  DULCOLAX   Used for:  Drug-induced constipation        Dose:  10 mg   Place 1 suppository (10 mg) rectally daily as needed for constipation   Quantity:  5 suppository   Refills:  1       blood glucose monitoring lancets   Used for:  Diabetes mellitus, type 2 (H)        Use to test blood sugars 2 times daily or as directed.   Quantity:  1 Box   Refills:  3       blood glucose monitoring test strip   Commonly known as:  no brand specified   Used for:  Type 2 diabetes mellitus with stage 3 chronic kidney disease (H)        Use to test blood sugar 2 times daily or as directed.   Quantity:  1 Box   Refills:  3       calcium carbonate-vitamin D 500-400 MG-UNIT Tabs per tablet   Used for:  Cardiac arrhythmia, unspecified cardiac arrhythmia type        Dose:  1 tablet   Take 1 tablet by mouth daily   Quantity:  90 tablet   Refills:  3       finasteride 5 MG tablet   Commonly known as:  PROSCAR   Used for:  Incomplete bladder emptying        Dose:  5 mg   Take 1 tablet (5 mg) by mouth daily   Quantity:  90 tablet   Refills:  3       guaiFENesin-dextromethorphan 100-10 MG/5ML syrup   Commonly known as:  ROBITUSSIN DM   Used for:  Chronic cough        Dose:  5 mL   Take 5 mLs by mouth every 4 hours as needed for cough   Quantity:  560 mL   Refills:  3       ketotifen 0.025 % Soln ophthalmic solution   Commonly known as:  ZADITOR/REFRESH ANTI-ITCH   Used for:  Allergic conjunctivitis, bilateral        Dose:  1 drop   Place 1 drop into both eyes 2 times daily   Quantity:  1 Bottle   Refills:  11       levETIRAcetam 750 MG tablet   Commonly known as:  KEPPRA   Used for:  Convulsions, unspecified convulsion type (H)        Dose:  750 mg   Take 1 tablet (750 mg) by mouth 2  times daily   Quantity:  180 tablet   Refills:  3       loratadine 10 MG tablet   Commonly known as:  CLARITIN   Used for:  Allergic rhinitis, unspecified allergic rhinitis type        Dose:  10 mg   Take 1 tablet (10 mg) by mouth daily   Quantity:  90 tablet   Refills:  3       losartan 25 MG tablet   Commonly known as:  COZAAR   Used for:  CHF (congestive heart failure), NYHA class IV, chronic, systolic (H)        Dose:  25 mg   Take 1 tablet (25 mg) by mouth daily   Quantity:  45 tablet   Refills:  3       magnesium oxide 400 MG tablet   Commonly known as:  MAG-OX   Used for:  Abdominal distention, Drug-induced constipation        Dose:  400 mg   Take 1 tablet (400 mg) by mouth 2 times daily   Quantity:  14 tablet   Refills:  0       melatonin 3 MG tablet   Used for:  Insomnia, unspecified type        Dose:  3 mg   Take 1 tablet (3 mg) by mouth At Bedtime   Refills:  0       nitroGLYcerin 0.4 MG sublingual tablet   Commonly known as:  NITROSTAT   Used for:  Coronary artery disease involving native coronary artery with unstable angina pectoris (H)        Dose:  0.4 mg   Place 1 tablet (0.4 mg) under the tongue every 5 minutes as needed for chest pain   Quantity:  30 tablet   Refills:  1       nystatin 660614 UNIT/GM Powd   Commonly known as:  MYCOSTATIN        Apply to affected areas of skin in the groin and on the scrotum three times a day as needed.   Quantity:  60 g   Refills:  3       * order for DME   Used for:  Fracture of femoral neck, right, closed, initial encounter, Stroke (H), CHF (congestive heart failure), NYHA class IV (H)        Equipment being ordered: Lift chair.  Please see indications and face-to-face encounter details in 2/3/15 Office Visit note.   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   Quantity:  1 each   Refills:  0       * order for DME   Used for:  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), Displaced fracture of right femoral neck, closed, with nonunion, subsequent encounter        Equipment being ordered: Wheelchair, manual, with elevated leg rests and tilt in space back.  Please fax to [employer]; I called them 11/26/16 and they requested the new order.  Face to face is in my 10/26/16 note.   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, subsequent encounter        Dose:  5 mg   Take 1 tablet (5 mg) by mouth every 4 hours as needed for moderate to severe pain   Quantity:  30 tablet   Refills:  0       pantoprazole 40 MG EC tablet    Commonly known as:  PROTONIX   Used for:  Gastrointestinal hemorrhage associated with chronic superficial gastritis        Dose:  40 mg   Take 1 tablet (40 mg) by mouth daily   Quantity:  180 tablet   Refills:  3       SENEXON-S 8.6-50 MG per tablet   Used for:  Slow transit constipation   Generic drug:  senna-docusate        TAKE 1-2 TABLETS BY MOUTH 2 TIMES DAILY AS NEEDED FOR CONSTIPATION   Quantity:  60 tablet   Refills:  3       simethicone 80 MG chewable tablet   Commonly known as:  MYLICON   Used for:  Slow transit constipation        Dose:  80 mg   Take 1 tablet (80 mg) by mouth 4 times daily   Quantity:  180 tablet   Refills:  5       tamsulosin 0.4 MG capsule   Commonly known as:  FLOMAX   Used for:  Incomplete bladder emptying        Dose:  0.4 mg   Take 1 capsule (0.4 mg) by mouth daily   Quantity:  90 capsule   Refills:  3       thiamine 100 MG tablet   Used for:  Cerebrovascular accident (CVA) due to embolism of right middle cerebral artery (H)        TAKE 1 TABLET (100 MG) BY MOUTH DAILY   Quantity:  90 tablet   Refills:  3       * Notice:  This list has 9 medication(s) that are the same as other medications prescribed for you. Read the directions carefully, and ask your doctor or other care provider to review them with you.      STOP taking     cefdinir 300 MG capsule   Commonly known as:  OMNICEF                Where to get your medicines      Some of these will need a paper prescription and others can be bought over the counter. Ask your nurse if you have questions.     Bring a paper prescription for each of these medications     sulfamethoxazole-trimethoprim 400-80 MG per tablet    sulfamethoxazole-trimethoprim 400-80 MG per tablet    warfarin 3 MG tablet                Protect others around you: Learn how to safely use, store and throw away your medicines at www.disposemymeds.org.             Medication List: This is a list of all your medications and when to take them. Check marks below  indicate your daily home schedule. Keep this list as a reference.      Medications           Morning Afternoon Evening Bedtime As Needed    acetaminophen 325 MG tablet   Commonly known as:  TYLENOL   Take 2 tablets (650 mg) by mouth every 6 hours   Last time this was given:  650 mg on 7/2/2017  4:33 PM                                   allopurinol 100 MG tablet   Commonly known as:  ZYLOPRIM   Take 1 tablet (100 mg) by mouth daily   Last time this was given:  100 mg on 7/2/2017  8:45 AM                                   ascorbic acid 500 MG tablet   Commonly known as:  VITAMIN C   Take 1 tablet (500 mg) by mouth daily With iron pill   Last time this was given:  500 mg on 7/2/2017  8:45 AM                                   ASPIRIN NOT PRESCRIBED   Commonly known as:  INTENTIONAL   Please choose reason not prescribed, below                                atorvastatin 10 MG tablet   Commonly known as:  LIPITOR   TAKE 1 TABLET (10 MG) BY MOUTH DAILY   Last time this was given:  10 mg on 7/2/2017  6:09 PM                                   * bisacodyl 5 MG EC tablet   Commonly known as:  DULCOLAX   Take 1 tablet (5 mg) by mouth daily as needed for constipation                                   * bisacodyl 10 MG Suppository   Commonly known as:  DULCOLAX   Place 1 suppository (10 mg) rectally daily as needed for constipation                                   blood glucose monitoring lancets   Use to test blood sugars 2 times daily or as directed.                                blood glucose monitoring test strip   Commonly known as:  no brand specified   Use to test blood sugar 2 times daily or as directed.                                calcium carbonate-vitamin D 500-400 MG-UNIT Tabs per tablet   Take 1 tablet by mouth daily                                   finasteride 5 MG tablet   Commonly known as:  PROSCAR   Take 1 tablet (5 mg) by mouth daily   Last time this was given:  5 mg on 7/2/2017  8:46 AM                                    guaiFENesin-dextromethorphan 100-10 MG/5ML syrup   Commonly known as:  ROBITUSSIN DM   Take 5 mLs by mouth every 4 hours as needed for cough                                   ketotifen 0.025 % Soln ophthalmic solution   Commonly known as:  ZADITOR/REFRESH ANTI-ITCH   Place 1 drop into both eyes 2 times daily   Last time this was given:  1 drop on 7/2/2017  8:43 AM                                      levETIRAcetam 750 MG tablet   Commonly known as:  KEPPRA   Take 1 tablet (750 mg) by mouth 2 times daily   Last time this was given:  750 mg on 7/2/2017  8:44 AM                                      loratadine 10 MG tablet   Commonly known as:  CLARITIN   Take 1 tablet (10 mg) by mouth daily                                   losartan 25 MG tablet   Commonly known as:  COZAAR   Take 1 tablet (25 mg) by mouth daily   Last time this was given:  25 mg on 7/2/2017  8:46 AM                                   magnesium oxide 400 MG tablet   Commonly known as:  MAG-OX   Take 1 tablet (400 mg) by mouth 2 times daily   Last time this was given:  400 mg on 7/2/2017  8:44 AM                                      melatonin 3 MG tablet   Take 1 tablet (3 mg) by mouth At Bedtime   Last time this was given:  3 mg on 7/1/2017  9:52 PM                                   metoprolol 25 MG 24 hr tablet   Commonly known as:  TOPROL-XL   TAKE 1 TABLET (25 MG) BY MOUTH EVERY EVENING   Last time this was given:  25 mg on 7/2/2017  6:05 PM                                   nitroGLYcerin 0.4 MG sublingual tablet   Commonly known as:  NITROSTAT   Place 1 tablet (0.4 mg) under the tongue every 5 minutes as needed for chest pain                                   nystatin 209011 UNIT/GM Powd   Commonly known as:  MYCOSTATIN   Apply to affected areas of skin in the groin and on the scrotum three times a day as needed.                                   * order for DME   Equipment being ordered: Lift chair.  Please see indications and  face-to-face encounter details in 2/3/15 Office Visit note.                                * order for DME   Equipment being ordered: Bilateral leg supports for manual wheelchair.                                * order for DME   Equipment being ordered: Reclining manual w/c with bilateral elevating leg rests.                                * order for DME   Equipment being ordered: Hospital Bed, fully electric.                                * order for DME   Equipment being ordered: Wheelchair, manual.                                * order for DME   Equipment being ordered: Wheelchair, manual, with elevated leg rests and tilt in space back.  Please fax to [facility] [place]; I called them 11/26/16 and they requested the new order.  Face to face is in my 10/26/16 note.                                * order for DME   Equipment being ordered: Cushioned heel boots.                                oxyCODONE 5 MG IR tablet   Commonly known as:  ROXICODONE   Take 1 tablet (5 mg) by mouth every 4 hours as needed for moderate to severe pain                                   pantoprazole 40 MG EC tablet   Commonly known as:  PROTONIX   Take 1 tablet (40 mg) by mouth daily   Last time this was given:  40 mg on 7/2/2017  8:44 AM                                   SENEXON-S 8.6-50 MG per tablet   TAKE 1-2 TABLETS BY MOUTH 2 TIMES DAILY AS NEEDED FOR CONSTIPATION   Last time this was given:  2 tablets on 7/1/2017  5:56 PM   Generic drug:  senna-docusate                                   simethicone 80 MG chewable tablet   Commonly known as:  MYLICON   Take 1 tablet (80 mg) by mouth 4 times daily   Last time this was given:  80 mg on 7/2/2017  4:14 PM                                         * sulfamethoxazole-trimethoprim 400-80 MG per tablet   Commonly known as:  BACTRIM/SEPTRA   Take 1 tablet by mouth 2 times daily                                      * sulfamethoxazole-trimethoprim 400-80 MG per tablet   Commonly known as:   BACTRIM/SEPTRA   Take 1 tablet by mouth 2 times daily                                tamsulosin 0.4 MG capsule   Commonly known as:  FLOMAX   Take 1 capsule (0.4 mg) by mouth daily   Last time this was given:  0.4 mg on 7/2/2017  8:45 AM                                   thiamine 100 MG tablet   TAKE 1 TABLET (100 MG) BY MOUTH DAILY   Last time this was given:  100 mg on 7/2/2017  8:46 AM                                   warfarin 3 MG tablet   Commonly known as:  COUMADIN   Take 3mgtonight and tomorrow night. Follow up with coumadin clinic on Tuesday for further dosing   Last time this was given:  3 mg on 7/2/2017  6:05 PM                                   * Notice:  This list has 11 medication(s) that are the same as other medications prescribed for you. Read the directions carefully, and ask your doctor or other care provider to review them with you.

## 2017-06-29 NOTE — ED NOTES
Pt's daughter is consistently taking ER equipment from the room's drawers to use for her father. Pt's daughter is also found taking blankets from the blanket warmer for her father multiple times. Writer told pt's daughter that she could not keep taking equipment and blankets for her father, because there is little supply for other patients and that it is an infection control issue.

## 2017-06-29 NOTE — PROGRESS NOTES
Indication of Interrogation:  Bleeding, eval for hemodynamic fxn, flows/PI  Assess driveline integrity     Type of VAD:  Heartmate 2    Current Parameters:  Flow= 4.3 lpm, Speed= 8800 rpm, Power= 5.5 capps, PI (if applicable)= 5.4    Abnormal Alarm on History:  No    Abnormal Events/Parameters Notes:  No: Continues to have frequent PI events, baseline for pt.    Changes Made during Interrogation:  Driveline outer shield worn, rescue tape applied. No alarms noted on history.

## 2017-06-30 ENCOUNTER — CARE COORDINATION (OUTPATIENT)
Dept: GERIATRIC MEDICINE | Facility: CLINIC | Age: 67
End: 2017-06-30

## 2017-06-30 LAB
ALBUMIN UR-MCNC: 10 MG/DL
ANION GAP SERPL CALCULATED.3IONS-SCNC: 10 MMOL/L (ref 3–14)
APPEARANCE UR: ABNORMAL
BILIRUB UR QL STRIP: NEGATIVE
BUN SERPL-MCNC: 23 MG/DL (ref 7–30)
CALCIUM SERPL-MCNC: 8.2 MG/DL (ref 8.5–10.1)
CHLORIDE SERPL-SCNC: 110 MMOL/L (ref 94–109)
CO2 SERPL-SCNC: 18 MMOL/L (ref 20–32)
COLOR UR AUTO: YELLOW
CREAT SERPL-MCNC: 2.15 MG/DL (ref 0.66–1.25)
ERYTHROCYTE [DISTWIDTH] IN BLOOD BY AUTOMATED COUNT: 18.5 % (ref 10–15)
GFR SERPL CREATININE-BSD FRML MDRD: 31 ML/MIN/1.7M2
GLUCOSE BLDC GLUCOMTR-MCNC: 127 MG/DL (ref 70–99)
GLUCOSE SERPL-MCNC: 154 MG/DL (ref 70–99)
GLUCOSE UR STRIP-MCNC: NEGATIVE MG/DL
HCT VFR BLD AUTO: 29.7 % (ref 40–53)
HGB BLD-MCNC: 9.5 G/DL (ref 13.3–17.7)
HGB UR QL STRIP: ABNORMAL
INR PPP: 3.68 (ref 0.86–1.14)
KETONES UR STRIP-MCNC: 5 MG/DL
LACTATE BLD-SCNC: 1.5 MMOL/L (ref 0.7–2.1)
LEUKOCYTE ESTERASE UR QL STRIP: ABNORMAL
MCH RBC QN AUTO: 29.6 PG (ref 26.5–33)
MCHC RBC AUTO-ENTMCNC: 32 G/DL (ref 31.5–36.5)
MCV RBC AUTO: 93 FL (ref 78–100)
NITRATE UR QL: NEGATIVE
PH UR STRIP: 5.5 PH (ref 5–7)
PLATELET # BLD AUTO: 145 10E9/L (ref 150–450)
POTASSIUM SERPL-SCNC: 3.6 MMOL/L (ref 3.4–5.3)
RBC # BLD AUTO: 3.21 10E12/L (ref 4.4–5.9)
RBC #/AREA URNS AUTO: 4 /HPF (ref 0–2)
SODIUM SERPL-SCNC: 137 MMOL/L (ref 133–144)
SP GR UR STRIP: 1.01 (ref 1–1.03)
SQUAMOUS #/AREA URNS AUTO: 1 /HPF (ref 0–1)
TRANS CELLS #/AREA URNS HPF: <1 /HPF (ref 0–1)
URN SPEC COLLECT METH UR: ABNORMAL
UROBILINOGEN UR STRIP-MCNC: NORMAL MG/DL (ref 0–2)
WBC # BLD AUTO: 29.2 10E9/L (ref 4–11)
WBC #/AREA URNS AUTO: 88 /HPF (ref 0–2)

## 2017-06-30 PROCEDURE — 87086 URINE CULTURE/COLONY COUNT: CPT | Performed by: INTERNAL MEDICINE

## 2017-06-30 PROCEDURE — 83605 ASSAY OF LACTIC ACID: CPT | Performed by: INTERNAL MEDICINE

## 2017-06-30 PROCEDURE — 93005 ELECTROCARDIOGRAM TRACING: CPT

## 2017-06-30 PROCEDURE — 36415 COLL VENOUS BLD VENIPUNCTURE: CPT | Performed by: INTERNAL MEDICINE

## 2017-06-30 PROCEDURE — 25000132 ZZH RX MED GY IP 250 OP 250 PS 637: Performed by: INTERNAL MEDICINE

## 2017-06-30 PROCEDURE — 21400006 ZZH R&B CCU INTERMEDIATE UMMC

## 2017-06-30 PROCEDURE — S0138 FINASTERIDE, 5 MG: HCPCS | Performed by: INTERNAL MEDICINE

## 2017-06-30 PROCEDURE — 85027 COMPLETE CBC AUTOMATED: CPT | Performed by: INTERNAL MEDICINE

## 2017-06-30 PROCEDURE — T1013 SIGN LANG/ORAL INTERPRETER: HCPCS | Mod: U3

## 2017-06-30 PROCEDURE — 81001 URINALYSIS AUTO W/SCOPE: CPT | Performed by: INTERNAL MEDICINE

## 2017-06-30 PROCEDURE — 25000128 H RX IP 250 OP 636: Performed by: INTERNAL MEDICINE

## 2017-06-30 PROCEDURE — 85610 PROTHROMBIN TIME: CPT | Performed by: INTERNAL MEDICINE

## 2017-06-30 PROCEDURE — 87040 BLOOD CULTURE FOR BACTERIA: CPT | Performed by: INTERNAL MEDICINE

## 2017-06-30 PROCEDURE — 00000146 ZZHCL STATISTIC GLUCOSE BY METER IP

## 2017-06-30 PROCEDURE — 99207 ZZC NO BILLABLE SERVICE THIS VISIT: CPT | Mod: GC | Performed by: INTERNAL MEDICINE

## 2017-06-30 PROCEDURE — 40000556 ZZH STATISTIC PERIPHERAL IV START W US GUIDANCE

## 2017-06-30 PROCEDURE — 93750 INTERROGATION VAD IN PERSON: CPT

## 2017-06-30 PROCEDURE — 93010 ELECTROCARDIOGRAM REPORT: CPT | Performed by: INTERNAL MEDICINE

## 2017-06-30 PROCEDURE — 80048 BASIC METABOLIC PNL TOTAL CA: CPT | Performed by: INTERNAL MEDICINE

## 2017-06-30 RX ORDER — LIDOCAINE 40 MG/G
CREAM TOPICAL
Status: DISCONTINUED | OUTPATIENT
Start: 2017-06-30 | End: 2017-07-02 | Stop reason: HOSPADM

## 2017-06-30 RX ORDER — TAMSULOSIN HYDROCHLORIDE 0.4 MG/1
0.4 CAPSULE ORAL DAILY
Status: DISCONTINUED | OUTPATIENT
Start: 2017-07-01 | End: 2017-07-02 | Stop reason: HOSPADM

## 2017-06-30 RX ORDER — MEROPENEM 500 MG/1
500 INJECTION, POWDER, FOR SOLUTION INTRAVENOUS EVERY 8 HOURS
Status: DISCONTINUED | OUTPATIENT
Start: 2017-06-30 | End: 2017-07-01

## 2017-06-30 RX ORDER — POTASSIUM CHLORIDE 1.5 G/1.58G
40 POWDER, FOR SOLUTION ORAL ONCE
Status: COMPLETED | OUTPATIENT
Start: 2017-06-30 | End: 2017-06-30

## 2017-06-30 RX ADMIN — FINASTERIDE 5 MG: 5 TABLET, FILM COATED ORAL at 08:47

## 2017-06-30 RX ADMIN — MEROPENEM 500 MG: 500 INJECTION, POWDER, FOR SOLUTION INTRAVENOUS at 08:41

## 2017-06-30 RX ADMIN — KETOTIFEN FUMARATE 1 DROP: 0.35 SOLUTION/ DROPS OPHTHALMIC at 20:50

## 2017-06-30 RX ADMIN — SIMETHICONE CHEW TAB 80 MG 80 MG: 80 TABLET ORAL at 15:40

## 2017-06-30 RX ADMIN — MEROPENEM 500 MG: 500 INJECTION, POWDER, FOR SOLUTION INTRAVENOUS at 15:40

## 2017-06-30 RX ADMIN — ALLOPURINOL 100 MG: 100 TABLET ORAL at 08:41

## 2017-06-30 RX ADMIN — MEROPENEM 500 MG: 500 INJECTION, POWDER, FOR SOLUTION INTRAVENOUS at 01:03

## 2017-06-30 RX ADMIN — Medication 100 MG: at 08:41

## 2017-06-30 RX ADMIN — LEVETIRACETAM 750 MG: 750 TABLET, FILM COATED ORAL at 08:42

## 2017-06-30 RX ADMIN — MAGNESIUM OXIDE TAB 400 MG (241.3 MG ELEMENTAL MG) 400 MG: 400 (241.3 MG) TAB at 08:41

## 2017-06-30 RX ADMIN — POTASSIUM CHLORIDE 40 MEQ: 1.5 POWDER, FOR SOLUTION ORAL at 14:38

## 2017-06-30 RX ADMIN — SIMETHICONE CHEW TAB 80 MG 80 MG: 80 TABLET ORAL at 20:50

## 2017-06-30 RX ADMIN — MAGNESIUM OXIDE TAB 400 MG (241.3 MG ELEMENTAL MG) 400 MG: 400 (241.3 MG) TAB at 20:50

## 2017-06-30 RX ADMIN — CALCIUM CARBONATE 600 MG (1,500 MG)-VITAMIN D3 400 UNIT TABLET 1 TABLET: at 08:41

## 2017-06-30 RX ADMIN — OXYCODONE HYDROCHLORIDE AND ACETAMINOPHEN 500 MG: 500 TABLET ORAL at 08:41

## 2017-06-30 RX ADMIN — PANTOPRAZOLE SODIUM 40 MG: 40 TABLET, DELAYED RELEASE ORAL at 08:41

## 2017-06-30 RX ADMIN — SIMETHICONE CHEW TAB 80 MG 80 MG: 80 TABLET ORAL at 08:41

## 2017-06-30 RX ADMIN — MEROPENEM 500 MG: 500 INJECTION, POWDER, FOR SOLUTION INTRAVENOUS at 23:52

## 2017-06-30 RX ADMIN — KETOTIFEN FUMARATE 1 DROP: 0.35 SOLUTION/ DROPS OPHTHALMIC at 08:47

## 2017-06-30 RX ADMIN — SODIUM CHLORIDE, POTASSIUM CHLORIDE, SODIUM LACTATE AND CALCIUM CHLORIDE 250 ML: 600; 310; 30; 20 INJECTION, SOLUTION INTRAVENOUS at 09:58

## 2017-06-30 RX ADMIN — ATORVASTATIN CALCIUM 10 MG: 10 TABLET, FILM COATED ORAL at 20:48

## 2017-06-30 RX ADMIN — SIMETHICONE CHEW TAB 80 MG 80 MG: 80 TABLET ORAL at 12:02

## 2017-06-30 RX ADMIN — LEVETIRACETAM 750 MG: 750 TABLET, FILM COATED ORAL at 20:49

## 2017-06-30 RX ADMIN — MELATONIN TAB 3 MG 3 MG: 3 TAB at 21:20

## 2017-06-30 ASSESSMENT — ENCOUNTER SYMPTOMS
COLOR CHANGE: 0
FLANK PAIN: 0
FREQUENCY: 0
HEADACHES: 0
POLYDIPSIA: 0
BRUISES/BLEEDS EASILY: 1

## 2017-06-30 NOTE — PROGRESS NOTES
"SPIRITUAL HEALTH SERVICES  SPIRITUAL ASSESSMENT Progress Note  [facility] 4E      REFERRAL SOURCE: pt/family ([place]vadorean-speaking, Orthodox) had requested support from \"\". Pt was followed by [place] randy Kunz during previous hospitalization.     I checked in with a family member to let them know I would pass on request to Chaplain Kunz.    PLAN: referral sent to Chaplain Germán Kunz for spiritual support. On-call  always available.    Supa Stewart M.Div. (Bill), [facility]  Staff   Pager [phone]      "

## 2017-06-30 NOTE — PROGRESS NOTES
[facility], [place]   Antimicrobial Management Team (AMT) Note            To: Cardiology  Unit: 4E  Allergies:  No known drug allergies  Infection History: gram positive UTIs    Brief Summary: Justice Parry is a 67 year old male with severe ischemic cardiomyopathy s/p palliative LVAD, known LVAD thrombus, recurrent embolic strokes, recurrent GI/urogenital bleeding (goal INR 1.8 - 2.5), BPH, recurrent gram positive UTIs with previous catheter (removed 5/19) who [place] [facility] [place] sepsis.    HPI: On 6/23, the patient s daughter stated that his urine has been brown all week but the patient is alert and feeling fine. UA from unspecified urine, showed light yellow, clear, with moderate leukocyte esterase.    Interval History:    6/28 -6/29:    Presented to [facility] ED around 2100 pm on 6/28 for hematuria with symptoms of dysuria and worked up for a UTI    Received a dose of Ceftriaxone @ 0054 on 6/29 and was sent home on Cefdinir. Later on that day he became slightly confused and had chills so family called EMS around 1530 and due to EMS diversion was transfer [place] [facility]. There he was hemodynamically stable but had a temp of 103F. He was transferred to [facility] around 2100 for inpatient LVAD management.    At [facility] he received a dose of Vancomycin, Zosyn, and Levofloxacin     Upon arrival [facility], UA collected from midstream - showed red, slightly cloudy with high protein albumin, WBC of 581. RBC 22    MRSA nasal swab - negative    Given a dose of Ceftazidime @2300    6/30:    WBC increased to 29.2 from 9 yesterday, hypotensive BP 70 s/50 s-60 s    UC from 6/28 - showed 50 to 100 K colonies ESBL E. Coli.  Ertapenem sensitives are pending. Important to note culture was taken after administration of antibiotics    Started on Meropenem this morning (day 1)    Assessment:    Urosepsis  Given patient s current septic status, meropenem is a good choice for ESBL E. Coli. UC  susceptibilities show meropenem sensitivity with ertapenem pending. Given severe sepsis status, total duration of therapy of 7 days would be reasonable. May consider switching to ertapenem given no need for Pseudomonas coverage.  If patient begins to clinically improve, Bactrim is a good option given sensitivity data and it s ability to achieve high urine concentration.    Recommendation/Interventions:   1). Agree with plan to treat with meropenem - narrow based on sensitivites      Discussed w/ ID Staff-Dr. Amaro and Clare Ruelas Pharm.D  Jv Barreto    Current Antibiotic therapy:    Meropenem (6/30 - current)    Previous Antibiotic therapy:    Ceftiaxone x 1 in [facility] ED 6/28  Cefdinir x 1 at home 6/29  Vancomycin, Zosyn, Levofloxacin x 1 at [facility] 6/29  Ceftazidime 1g q12 hours 6/30 - 6/30     Vital Signs and other clinical features:        Temperature          Culture Results:  Date Culture Site Organism    6/28 Urine - midstream ESBL E. Coli - see susceptibilities below   6/29  PCR nares  MRSA - negative / MSSA - positive

## 2017-06-30 NOTE — PLAN OF CARE
Problem: Goal Outcome Summary  Goal: Goal Outcome Summary  Outcome: Improving  Patient stable and slept well overnight. NSR w/ PVCs 60s-90s. LVAD intact- Heartmate 2 with Flows: 4-5.1, PI: 3.2-4.7, Speed: 8800, Power: 5-6.1. MAPs fluctuating. Afebrile. RA. No complaints of shortness of breath. Headache present at beginning of shift- prn Tylenol given with full relief. Pt is A&Ox4- limited English speaking- daughter/caretaker at bedside speaks fluent English. Voiding in urinal- urine + for ESBL- receiving Meropenem IV. Will continue to monitor and update MDs with any changes.

## 2017-06-30 NOTE — PLAN OF CARE
Problem: Goal Outcome Summary  Goal: Goal Outcome Summary  Outcome: Improving  D: Transferred to [facility] for further VAD management and possible urosepsis.   I/A: Afebrile. MAPs primarily 60s-70s. HM II LVAD no unexpected alarms, numbers within pt normal range. 250 mL LR bolus this AM for trending low flows in the 3.9 range, flows back up in 4 range after bolus infused. Swallows pills well, good appetite. Voiding spontaneously in urinal. Pia  here today, spoke with MD, pt, and pt daughter at bedside to update on status. Denies pain or SOB. L sided weakness from prior CVA, family here and assisting with cares per pt request.   P: Continue to monitor. Pt transferred to  for improved status/ no longer requiring ICU care. Pt transferred at 1810 with RN accompanying, belongings, VAD equipment, chart sent with pt. Continue to update MD and pt family.

## 2017-06-30 NOTE — PROGRESS NOTES
Indication of Interrogation:  Admission    Type of VAD:  Heartmate 2    Current Parameters:  Flow= 4.6 lpm, Speed= 8800 rpm, Power= 5.5 capps, PI (if applicable)= 4.5    Abnormal Alarm on History:  No    Abnormal Events/Parameters Notes:  Yes, explain: intermittent frequent PIs. PIs WNL    Changes Made during Interrogation:  No

## 2017-06-30 NOTE — PROGRESS NOTES
[facility] Cardiology Progress Note          Assessment and Plan:   Assessment:  67 year old male with ICM s/p LVAD as DT, known LVAD thrombus, recurrent embolic strokes, recurrent GI/urogenital bleeding (goal INR 1.8 - 2.5),  BPH,  recurrent UTIs who was [place] [facility] [place] of ESBL E. Coli sepsis due to UTI.    Plan:  # Sepsis due to ESBL E.coli UTI  - meropenem, will consider switch to ertapenem  - lower PIs and dry on exam,  ml once  - May need chronic truong to prevent urinary retention  - Cont. Finasteride, tamsulosin to prevent retention    # HEATH on CKD 3  Suspect due to sepsis and mild dehydration. Basline ~ 1.6.  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 Butler.    Roddy Mercer, PGY-2  Internal Medicine  [phone]        Interval History:   Notes reviewed. No acute events overnight. Interview done with an .    Still having dysuria. Feeling better today.        Review Of Systems   Negative except as stated above.            Medications:   Reviewed. Details in EPIC.     Blood pressure (!) 87/69, temperature 97.9  F (36.6  C), temperature source Oral, resp. rate 16, weight 81.9 kg (180 lb 8.9 oz), SpO2 100 %.    General: Alert, interactive, NAD  CV: hum of lvad  Chest: clear bilaterally  Abdomen: Soft, nontender, nondistended. +BS.  Extremities: No LE  edema  Neuro:  Face symmetric, moving all extremities without focal deficit         Data:   Reviewed. Details in EPIC.

## 2017-06-30 NOTE — PROGRESS NOTES
[facility]  Patient is currently open to home care services with [facility].  The patient is currently receiving RN services, Palliative care.  [facility]  and team have been notified of patient admission.  [facility] liaison will continue to follow patient during stay.  If appropriate provide orders to resume home care at time of discharge.    Melissa Sifuentes RN, BSN  [facility] Liaison  [phone]

## 2017-06-30 NOTE — PHARMACY-ANTICOAGULATION SERVICE
Pharmacy Warfarin Consult Note     Pharmacy has been consulted to manage this patient s warfarin therapy.  Indication: LVAD/RVAD  Therapy Goal: Other - see comments (1.8-2.5)  Warfarin Prior to Admission: Yes  Warfarin PTA Regimen: Per anticoagulation clinic - 6/26: 4.5 mg; 6/27: 6 mg; 6/28: 4.5 mg; 6/29: 6 mg; 6/30: 4.5 mg; 7/1: 6 mg; 7/2: 6 mg  Significant drug interactions: Cefdinir, allopurinol, panoprazole  Dose Comments: Given supratherapeutic INR and acute illness, will hold dose for tonight.    INR   Date Value Ref Range Status   06/29/2017 3.31 (H) 0.86 - 1.14 Final   06/28/2017 2.68 (H) 0.86 - 1.14 Final     Recommend holding warfarin today.  Pharmacy will monitor Sohan Rendon daily and order warfarin doses to achieve specified goal.      Please contact pharmacy as soon as possible if the warfarin needs to be held for a procedure or if the warfarin goals change.      Radha Mcgrath, PharmD  June 29, 2017

## 2017-06-30 NOTE — PROGRESS NOTES
Care Coordinator Progress Note     Admission Date/Time:  6/29/2017  Attending MD:  Dafne Aviles, *     Data  Chart reviewed, discussed with interdisciplinary team.   Patient was admitted for: Data Unavailable.    Concerns with insurance coverage for discharge needs: None.  Current Living Situation: Patient lives with family.  Support System: Supportive  Services Involved: Home Care and PCA  Transportation: [facility] [phone]        Assessment  Received a voicemail from [facility]  Yisel Wise that is filling in for pt's usual  Nena Salazar ([phone]).  Updated that pt lives at home with family. He receives 11.5 hr/d of PCA with [facility] ([phone]) as well as  home care services ([phone]).  He does have U care ride services when he is ready for discharge.       Plan  Anticipated Discharge Date:  TBD    Anticipated Discharge Plan:  TBD      Ryder Rodriguez, RN, BSN  ICU Care Coordinator  Pager: [phone]  Phone:  [phone]

## 2017-06-30 NOTE — PROGRESS NOTES
Received notice that client went to the ER on 6/28/17 for hematuria and was sent home after treatment. Client then developed some confusion and chills at home so went to [facility]. [facility] then transferred client to [facility] for LVAD follow up. Called [facility] and spoke with client's nurse Maximus and shared POC with her. Also left a message with SW line sharing POC. Called daughter Almaz and no answer. ML requesting a call back. Called daughter Antionette who answered. Asked how her father was doing and she reported that she was currently in [facility] and to talk with her sister-will see if Almaz calls back. PCP notified via epic.  for Melissa Sifuentes  Home Care nurse with this CM and primary CM names and numbers. Melissa already aware of the admission as she has notes in epic chart already. Called [facility] and they were already aware of client being in the hospital. Transitions log started.   Yisel Cunha RN, PHN, [facility]   [phone]

## 2017-06-30 NOTE — H&P
MIC[facility] Admission  History &Physical  Sohan Rendon MRN: [id number]  Age: 67 year old, : 1950  Date of Admission:2017  Primary care provider: Thomas Dill          Chief Complaint  Headache    Transferred from [facility] with urosepsis         History of Present Illness  67 year old male with severe ICM s/p palliative LVAD, known LVAD thrombus, recurrent embolic strokes, recurrent GI/urogenital bleeding (goal INR 1.8 - 2.5),  BPH,  recurrent UTIs who was transferred from [facility] for management of UTI sepsis with non-lactose fermenting gram neg rods.     History is obtained mainly from pts daughter, per patients wishes. He was reportedly in his usual state of health until yesterday when he presented to the ED for hematuria. This problem has been recurrent but started around 3:30 pm yesterday. He also had some associated dysuria. UA showed large blood, leuk esterase, and 50 - 100k non-lactose fermenting gram neg rods. Received a dose of Ceftazidine in the ED and was sent home on cefdinir. At home he became slightly confused and had chills so family called EMS who brought him to [facility]. There he was hemodynamically stable but with a temp of 103F. He was transferred to [facility] for better inpatient LVAD management.     Today he endorses urinary frequency and mild dysuria. His hematuria has improved somewhat. He feels better since received IVF at [facility]. He does notice a headace. Denies chills. Denies melena or hematochezia. Denies chest pain or syncopal event.     Of note he was hospitalized most recently in May this year for acalculous cholecystitis. A stent was placed on 17. Denies any abdominal pain today.            Past Medical History  Past Medical History:   Diagnosis Date     Acute right MCA stroke (H) 2013    With L sided hemiparesis      Anemia of chronic disease     Baseline Hb 8-9     BPH (benign prostatic hyperplasia)      CHF (congestive heart failure), NYHA class IV (H)  10/9/2012    s/p HeartMate II.  Was on milrinone and dobutamine prior to LVAD      CKD (chronic kidney disease)     Baseline Cr=     Clostridium difficile colitis 12/2012      Dysphagia     PEG tube placed in 2012.  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  Normal carotid duplex 3/2014.       Esophagitis 12/2012    EGD with esophagitis and multiple douenal ulcers     Fracture of femoral neck, right, closed (H) 2/3/2015    Presumed pathologic as patient is non-weight-bearing and suffered no trauma.  Family declined operative repair during hospital stay 1/23 - 1/30/15.     Gastric and duodenal angiodysplasia with hemorrhage 6/18/2015     GERD (gastroesophageal reflux disease)      Gout      HTN (hypertension)     LDL=59 (3/29/2014)     Hyperlipaemia      Ischemic cardiomyopathy      Mitral regurgitation     s/p MVR with Carbomedics ring      Myocardial infarction (H) 1998    In [facility] without intervention      Nonsenile cataract     BE     PFO (patent foramen ovale)     s/p closure (10/9/2012)     TB lung, latent     s/p 9 months INH in 2012              Past Surgical History  Past Surgical History:   Procedure Laterality Date     C CABG, VEIN, FIVE  2001     CATARACT IOL, RT/LT Right 11/19/2015     ENDOSCOPIC RETROGRADE CHOLANGIOPANCREATOGRAM N/A 5/9/2017    Procedure: COMBINED ENDOSCOPIC RETROGRADE CHOLANGIOPANCREATOGRAPHY, PLACE TUBE/STENT;  Endoscopic Retrograde Cholangiopancreatogram, Stent (Zimmon Biliary 7fr 12cm) Placement;  Surgeon: Cristo Grove MD;  Location: [facility]     ESOPHAGOSCOPY, GASTROSCOPY, DUODENOSCOPY (EGD), COMBINED N/A 6/10/2015    Procedure: COMBINED ESOPHAGOSCOPY, GASTROSCOPY, DUODENOSCOPY (EGD);  Surgeon: Edu Jenkins MD;  Location: [facility]     ESOPHAGOSCOPY, GASTROSCOPY, DUODENOSCOPY (EGD), COMBINED N/A 6/15/2015    Procedure: COMBINED ESOPHAGOSCOPY, GASTROSCOPY, DUODENOSCOPY (EGD);   Surgeon: Yury Bonner MD;  Location: [facility] OR     H INSERT ICD  2/10/2011    And pacemaker.  Not BiV     INSERT VENTRICULAR ASSIST DEVICE LEFT (HEARTMATE II)  10/9/2012     IR GASTRO JEJUNOSTOMY TUBE PLACEMENT       PHACOEMULSIFICATION CLEAR CORNEA WITH STANDARD INTRAOCULAR LENS IMPLANT Right 11/19/2015    Procedure: PHACOEMULSIFICATION CLEAR CORNEA WITH STANDARD INTRAOCULAR LENS IMPLANT;  Surgeon: Violeta Cosme MD;  Location: [facility]     REPAIR PATENT FORAMEN OVALE  10/9/2012     REPAIR VALVE MITRAL  2/9/2012    28 mm Carbomedics 2/3 ring               Social History  Social History   Substance Use Topics     Smoking status: Former Smoker     Smokeless tobacco: Former User     Alcohol use No             Family History  Family History   Problem Relation Age of Onset     Family History Negative No family hx of      Glaucoma No family hx of      Macular Degeneration No family hx of      CANCER No family hx of      no skin cancer     Family history reviewed and updated in EPIC          Allergies  All allergies reviewed and addressed          Medications  Current Facility-Administered Medications   Medication     naloxone (NARCAN) injection 0.1-0.4 mg     Patient is already receiving anticoagulation with heparin, enoxaparin (LOVENOX), warfarin (COUMADIN)  or other anticoagulant medication     0.9% sodium chloride BOLUS     acetaminophen (TYLENOL) tablet 650 mg    Or     acetaminophen (TYLENOL) solution 650 mg     senna-docusate (SENOKOT-S;PERICOLACE) 8.6-50 MG per tablet 1-2 tablet     [START ON 6/30/2017] allopurinol (ZYLOPRIM) tablet 100 mg     [START ON 6/30/2017] ascorbic acid (VITAMIN C) tablet 500 mg     atorvastatin (LIPITOR) tablet 10 mg     [START ON 6/30/2017] calcium-vitamin D (CALTRATE) 600-400 MG-UNIT per tablet 1 tablet     [START ON 6/30/2017] finasteride (PROSCAR) tablet 5 mg     ketotifen (ZADITOR/REFRESH ANTI-ITCH) 0.025 % ophthalmic solution 1 drop     levETIRAcetam (KEPPRA) tablet 750 mg      [START ON 6/30/2017] losartan (COZAAR) tablet 25 mg     magnesium oxide (MAG-OX) tablet 400 mg     melatonin tablet 3 mg     oxyCODONE (ROXICODONE) IR tablet 5 mg     [START ON 6/30/2017] pantoprazole (PROTONIX) EC tablet 40 mg     simethicone (MYLICON) chewable tablet 80 mg     [START ON 6/30/2017] tamsulosin (FLOMAX) capsule 0.4 mg     [START ON 6/30/2017] thiamine tablet 100 mg     Warfarin Therapy Reminder (Check START DATE - warfarin may be starting in the FUTURE)     warfarin-No DOSE today     cefTAZidime (FORTAZ) 1 g vial to attach to  ml bag for ADULTS or NS 50 ml bag for PEDS             Vitals  Temp:  [97.1  F (36.2  C)-98.5  F (36.9  C)] 97.9  F (36.6  C)  Pulse:  [77] 77  Heart Rate:  [67-93] 93  Resp:  [18] 18  BP: ()/() 96/78  SpO2:  [90 %-100 %] 100 %        Ventilator  Resp: 18       Intake/Output  Date 06/29/17 0700 - 06/30/17 0659   Shift 7627-5786 1480-7265 1679-7675 24 Hour Total   I  N  T  A  K  E   Shift Total       O  U  T  P  U  T   Urine  100  100    Shift Total  100  100   Weight (kg)                  Physical Exam    Gen: Awake and alert. Oriented. Slow to answer questions. Prefers his daughter answer.   HEENT:AT/ NC, PERRL b/l,  sclera anicteric  PULM/THORAX:Audicble VAD. Clear breath sounds bilaterally  CV: VAD whirr present. No pedal edema.   ABD: obese, soft, nontender, nondistended. Normoactive bowel sounds x 4, no HSM appreciated.  EXT: warm and well perfused.No clubbing or cyanosis. No asymmetrical edema or tenderness   SKIN: Capillary refill normal        ROUTINE ICU LABS (Last four results)    CMP  Recent Labs  Lab 06/28/17 2059 06/28/17  1005    142   POTASSIUM 3.8 3.9   CHLORIDE 109 112*   CO2 23 21   ANIONGAP 7 9   * 108*   BUN 23 25   CR 1.97* 1.97*   GFRESTIMATED 34* 34*   GFRESTBLACK 41* 41*   JOSÉ 8.2* 8.2*   PROTTOTAL 7.6  --    ALBUMIN 3.2*  --    BILITOTAL 1.0  --    ALKPHOS 106  --    AST 64*  --    ALT 19  --        CBC  Recent Labs  Lab  06/28/17 2059   WBC 9.1   RBC 3.84*   HGB 11.5*   HCT 35.3*   MCV 92   MCH 29.9   MCHC 32.6   RDW 18.1*   *       INR  Recent Labs  Lab 06/28/17 2059 06/26/17   INR 2.68* 2.60       Arterial Blood GasNo lab results found in last 7 days.     Microbiology  Non-lactose fermenting gram neg rods on UA    H/O gram pos cocci in urine.                Imaging    None            Assessment and Plan  Severe ICM s/p palliative LVAD, known LVAD thrombus, recurrent embolic strokes, recurrent GI/urogenital bleeding (goal INR 1.8 - 2.5),  BPH,  recurrent UTIs who was transferred from [facility] for management of UTI sepsis with non-lactose fermenting gram neg rods.     Neurologic  # H/O CVA of rMCA 9/2014  # Recurrent embolic strokes related to LVAD  # Seizure d/o since June 2015  - Continue home keppra 750mg BID    Cardiovascular  # Stage D, [facility] IIIB ischemic cardiomyopahty  # LVAD  # Chronic LVAD thrombus  - Continue home atorvastatin 10mg qday, losartan 25mg qday  - Home long acting metoprolol 50 mg qAM and 25 mg qhs held temporarily due to concern for sepsis. Restart in AM.     Respiratory  No active issues    Gastrointestinal  No active issues    Infectious Disease  # UTI - h/o gram pos UTIs. Now with gram neg.   - F/U cultures  - Ceftazidime 1g q12 hours   - May need chronic truong to prevent urinary retention  - Cont. Finasteride, tamsulosin to prevent retention    Update: urine micro speciated to ESBL. Abx changed to meropenem.     Antibiotics:  Ceftaz x 1 in ED 6/28  Cefdinir x 1 at home 6/28  Vanc, zosyn, levo x 1 at [facility] 6/29  Ceftaz 1g q12 hours 6/30 - 6/30  Meropenem 500mg q8 hours    Cultures:   Pending    Hematology  # Chronic normocytic anemia  - Due to GI bleed, hematuria, and chronic hemolysis from pump thrombus    Endocrine  No active issues.   Glucose checks: q4hr    Renal/Electolytes/FEN    FILTER. Baseline Cr: 1.7 - 1.9    ACID/BASE/ELECTROLYTES  No derangements    VOLUME STATUS  Euvolemic    Skin Care  No  active issues    PPX:   - VTE: on warfarin  - GI: No indication for PPX  FEN: Regular diet, slow bolus PRN  CODE: Full  Family: Daughter and patient updated at bedside  Dispo: To home when stable x 12-24 hours      There have been talks in the past regarding goals of care. See prior notes.     Patient will be staffed with Dr. Aviles in AM.     Xenia Ivey MD    Internal Medicine PGY-3  Pager: [phone]

## 2017-07-01 LAB
ANION GAP SERPL CALCULATED.3IONS-SCNC: 8 MMOL/L (ref 3–14)
BACTERIA SPEC CULT: ABNORMAL
BACTERIA SPEC CULT: NO GROWTH
BUN SERPL-MCNC: 19 MG/DL (ref 7–30)
CALCIUM SERPL-MCNC: 8.5 MG/DL (ref 8.5–10.1)
CHLORIDE SERPL-SCNC: 114 MMOL/L (ref 94–109)
CO2 SERPL-SCNC: 18 MMOL/L (ref 20–32)
CREAT SERPL-MCNC: 2.1 MG/DL (ref 0.66–1.25)
ERYTHROCYTE [DISTWIDTH] IN BLOOD BY AUTOMATED COUNT: 18.3 % (ref 10–15)
GFR SERPL CREATININE-BSD FRML MDRD: 32 ML/MIN/1.7M2
GLUCOSE BLDC GLUCOMTR-MCNC: 106 MG/DL (ref 70–99)
GLUCOSE SERPL-MCNC: 104 MG/DL (ref 70–99)
HCT VFR BLD AUTO: 28.8 % (ref 40–53)
HGB BLD-MCNC: 9.2 G/DL (ref 13.3–17.7)
INR PPP: 2.72 (ref 0.86–1.14)
LDH SERPL L TO P-CCNC: 993 U/L (ref 85–227)
Lab: ABNORMAL
Lab: NORMAL
MCH RBC QN AUTO: 29.8 PG (ref 26.5–33)
MCHC RBC AUTO-ENTMCNC: 31.9 G/DL (ref 31.5–36.5)
MCV RBC AUTO: 93 FL (ref 78–100)
MICRO REPORT STATUS: ABNORMAL
MICRO REPORT STATUS: NORMAL
MICROORGANISM SPEC CULT: ABNORMAL
PLATELET # BLD AUTO: 132 10E9/L (ref 150–450)
POTASSIUM SERPL-SCNC: 4 MMOL/L (ref 3.4–5.3)
RBC # BLD AUTO: 3.09 10E12/L (ref 4.4–5.9)
SODIUM SERPL-SCNC: 140 MMOL/L (ref 133–144)
SPECIMEN SOURCE: ABNORMAL
SPECIMEN SOURCE: NORMAL
WBC # BLD AUTO: 9.7 10E9/L (ref 4–11)

## 2017-07-01 PROCEDURE — 99232 SBSQ HOSP IP/OBS MODERATE 35: CPT | Mod: GC | Performed by: INTERNAL MEDICINE

## 2017-07-01 PROCEDURE — T1013 SIGN LANG/ORAL INTERPRETER: HCPCS | Mod: U3

## 2017-07-01 PROCEDURE — 36415 COLL VENOUS BLD VENIPUNCTURE: CPT | Performed by: INTERNAL MEDICINE

## 2017-07-01 PROCEDURE — 25000132 ZZH RX MED GY IP 250 OP 250 PS 637: Performed by: HOSPITALIST

## 2017-07-01 PROCEDURE — 25000132 ZZH RX MED GY IP 250 OP 250 PS 637: Performed by: INTERNAL MEDICINE

## 2017-07-01 PROCEDURE — S0138 FINASTERIDE, 5 MG: HCPCS | Performed by: INTERNAL MEDICINE

## 2017-07-01 PROCEDURE — 21400006 ZZH R&B CCU INTERMEDIATE UMMC

## 2017-07-01 PROCEDURE — 25000128 H RX IP 250 OP 636: Performed by: INTERNAL MEDICINE

## 2017-07-01 PROCEDURE — 83615 LACTATE (LD) (LDH) ENZYME: CPT | Performed by: INTERNAL MEDICINE

## 2017-07-01 PROCEDURE — 25000128 H RX IP 250 OP 636: Performed by: HOSPITALIST

## 2017-07-01 PROCEDURE — 85610 PROTHROMBIN TIME: CPT | Performed by: INTERNAL MEDICINE

## 2017-07-01 PROCEDURE — 85027 COMPLETE CBC AUTOMATED: CPT | Performed by: INTERNAL MEDICINE

## 2017-07-01 PROCEDURE — 80048 BASIC METABOLIC PNL TOTAL CA: CPT | Performed by: INTERNAL MEDICINE

## 2017-07-01 PROCEDURE — 00000146 ZZHCL STATISTIC GLUCOSE BY METER IP

## 2017-07-01 RX ORDER — LOSARTAN POTASSIUM 25 MG/1
25 TABLET ORAL DAILY
Status: DISCONTINUED | OUTPATIENT
Start: 2017-07-01 | End: 2017-07-02 | Stop reason: HOSPADM

## 2017-07-01 RX ORDER — WARFARIN SODIUM 2.5 MG/1
2.5 TABLET ORAL
Status: COMPLETED | OUTPATIENT
Start: 2017-07-01 | End: 2017-07-01

## 2017-07-01 RX ORDER — ERTAPENEM 1 G/1
1 INJECTION, POWDER, LYOPHILIZED, FOR SOLUTION INTRAMUSCULAR; INTRAVENOUS DAILY
Status: DISCONTINUED | OUTPATIENT
Start: 2017-07-01 | End: 2017-07-02 | Stop reason: HOSPADM

## 2017-07-01 RX ADMIN — LEVETIRACETAM 750 MG: 750 TABLET, FILM COATED ORAL at 08:43

## 2017-07-01 RX ADMIN — FINASTERIDE 5 MG: 5 TABLET, FILM COATED ORAL at 08:44

## 2017-07-01 RX ADMIN — MAGNESIUM OXIDE TAB 400 MG (241.3 MG ELEMENTAL MG) 400 MG: 400 (241.3 MG) TAB at 20:27

## 2017-07-01 RX ADMIN — ACETAMINOPHEN 650 MG: 325 TABLET, FILM COATED ORAL at 09:03

## 2017-07-01 RX ADMIN — CALCIUM CARBONATE 600 MG (1,500 MG)-VITAMIN D3 400 UNIT TABLET 1 TABLET: at 08:44

## 2017-07-01 RX ADMIN — SENNOSIDES AND DOCUSATE SODIUM 2 TABLET: 8.6; 5 TABLET ORAL at 17:56

## 2017-07-01 RX ADMIN — OXYCODONE HYDROCHLORIDE AND ACETAMINOPHEN 500 MG: 500 TABLET ORAL at 08:42

## 2017-07-01 RX ADMIN — KETOTIFEN FUMARATE 1 DROP: 0.35 SOLUTION/ DROPS OPHTHALMIC at 20:56

## 2017-07-01 RX ADMIN — ERTAPENEM SODIUM 1 G: 1 INJECTION, POWDER, LYOPHILIZED, FOR SOLUTION INTRAMUSCULAR; INTRAVENOUS at 15:09

## 2017-07-01 RX ADMIN — KETOTIFEN FUMARATE 1 DROP: 0.35 SOLUTION/ DROPS OPHTHALMIC at 08:48

## 2017-07-01 RX ADMIN — SIMETHICONE CHEW TAB 80 MG 80 MG: 80 TABLET ORAL at 14:07

## 2017-07-01 RX ADMIN — MAGNESIUM OXIDE TAB 400 MG (241.3 MG ELEMENTAL MG) 400 MG: 400 (241.3 MG) TAB at 08:44

## 2017-07-01 RX ADMIN — MEROPENEM 500 MG: 500 INJECTION, POWDER, FOR SOLUTION INTRAVENOUS at 08:53

## 2017-07-01 RX ADMIN — SIMETHICONE CHEW TAB 80 MG 80 MG: 80 TABLET ORAL at 08:42

## 2017-07-01 RX ADMIN — ATORVASTATIN CALCIUM 10 MG: 10 TABLET, FILM COATED ORAL at 20:27

## 2017-07-01 RX ADMIN — SIMETHICONE CHEW TAB 80 MG 80 MG: 80 TABLET ORAL at 17:56

## 2017-07-01 RX ADMIN — LOSARTAN POTASSIUM 25 MG: 25 TABLET, FILM COATED ORAL at 15:08

## 2017-07-01 RX ADMIN — PANTOPRAZOLE SODIUM 40 MG: 40 TABLET, DELAYED RELEASE ORAL at 08:44

## 2017-07-01 RX ADMIN — Medication 100 MG: at 08:44

## 2017-07-01 RX ADMIN — SIMETHICONE CHEW TAB 80 MG 80 MG: 80 TABLET ORAL at 20:28

## 2017-07-01 RX ADMIN — ACETAMINOPHEN 650 MG: 325 TABLET, FILM COATED ORAL at 14:07

## 2017-07-01 RX ADMIN — MELATONIN TAB 3 MG 3 MG: 3 TAB at 21:52

## 2017-07-01 RX ADMIN — ALLOPURINOL 100 MG: 100 TABLET ORAL at 08:43

## 2017-07-01 RX ADMIN — TAMSULOSIN HYDROCHLORIDE 0.4 MG: 0.4 CAPSULE ORAL at 08:43

## 2017-07-01 RX ADMIN — LEVETIRACETAM 750 MG: 750 TABLET, FILM COATED ORAL at 20:28

## 2017-07-01 RX ADMIN — WARFARIN SODIUM 2.5 MG: 2.5 TABLET ORAL at 17:56

## 2017-07-01 NOTE — PROGRESS NOTES
Cardiology Progress Note  Sohan Rendon MRN: [id number]  Age: 67 year old, : 1950  Date: @todaysdater@             Subjective     No acute events overnight, patient was transferred to the floor. No chest pain, SOB, palpitations, abdominal pain, fevers/chills.          Objective     B/P: 95/72, T: 98.4, P: 83, R: 20    Intake/Output Summary (Last 24 hours) at 17 1437  Last data filed at 17 1035   Gross per 24 hour   Intake              460 ml   Output             1025 ml   Net             -565 ml     Vitals:    17 0500 17 0459   Weight: 81.9 kg (180 lb 8.9 oz) 80.4 kg (177 lb 4.8 oz)         Gen: AA&Ox3, no acute distress  HEENT:AT/ NC, PERRL b/l, EOM grossly intact, mucous membranes pink, moist without plaque or exudate  BACK: no CVA tenderness, no midline bony tenderness  PULM/THORAX: Clear to auscultation bilaterally, no rales/stridor/wheezes  CV: hum of lvad  ABD: obese, soft, nontender, nondistended. Normoactive bowel sounds x 4, no HSM appreciated.  EXT: No LE edema, clubbing or cyanosis. Cool, well perfused No asymmetrical edema or tenderness to palpation in calves bilaterally.    Drips: None          Data:     Hemodynamics reviewed and pertinent for:    Map improving (65 - 75)    LABS reviewed and pertinent for:    Anticoagulation: INR 2.7     from 1400    Imaging: No new imaging          Medications   Reviewed          Assessment and Plan:     Assessment:  67 year old male with ICM s/p LVAD as DT, known LVAD thrombus, recurrent embolic strokes, recurrent GI/urogenital bleeding (goal INR 1.8 - 2.5),  BPH,  recurrent UTIs who was transferred from [facility] for management of ESBL E. Coli sepsis due to UTI.     Plan:  # Sepsis due to ESBL E.coli UTI  - Switched meropenem to ertapenem, will move to an oral regimen tomorrow  - May need chronic truong to prevent urinary retention  - Cont. Finasteride, tamsulosin to prevent retention     # HEATH on CKD  3  Suspect due to sepsis and mild dehydration. Basline ~ 1.6. Creatinine stable today, likely it will turn around tomorrow as patient recovers from urosepsis  - monitor  - hold losartan     # Stage D, [facility] IIIB ischemic cardiomyopahty  # LVAD  # Chronic LVAD thrombus  - continue home atorvastatin  - restarting warfarin today  - not on ASA due to history of GI bleed  - hold metoprolol   - restarted home losartan today     # H/O CVA of rMCA 9/2014  # Recurrent embolic strokes related to LVAD  # Seizure d/o since June 2015  - Continue home keppra 750mg BID     # Chronic normocytic anemia  - Due to GI bleed, hematuria, and chronic hemolysis from pump thrombus     FEN: regular  Proph: on warfarin  Dispo: pending clinical course, likely home tomorrow     Code: FULL     Patient was discussed with staff attending, Dr. Butler, who agrees with the above assessment and plan.    Avtar Alvarez MD PhD  PGY-2 Internal Medicine  Cardiology Service  Pager: [phone]

## 2017-07-01 NOTE — PROGRESS NOTES
07/01/17 7973   Visit Information   Visit Made By Staff    Type of Visit Initial   Visited Patient;Family   Interventions   Basic Spiritual Interventions   Assessment of spiritual needs/resources;Reflective conversation;Life Review;Prayer   Provision of Spiritual Resources  Baptism article(s)/object(s) of devotion   Advanced Assessments/Interventions   Presenting Concerns/Issues Spiritual/Sabianist/emotional support   SPIRITUAL HEALTH SERVICES Progress Note  [facility] [place]         DATA:    Visited with pt/family on the basis of  requested for the pt/family.  Reflected with pt/family around their hospital experience, sources of spiritual and emotional support and current spiritual health needs. Pt s daughter talked about her dad current situation and what I mean for her and her dad. During my visit, pt was laying down on his bed. His daughter was with him in the room. During our conversation with pt. s daughter, she reported that, she worries about the health of her dad and her dad in and out last 3 years.  I let them know that I could be of support of the pt and his family.  I would be able to coordinate and participate as a spiritual support for both him and his family. Pt receives support from his children and his extended family. Pt/family reported that their tanya is important to them and hope for the future and trust in God.       INTERVENTION:    Emotional support. Reflective conversation integrating illness elements and family spiritual narratives.  I provided a special prayer asking of God to help the pt and to ease and eliminate any suffering and pain that the pt and his family feel. I gave Islamic Prayer Booklet.     OUTCOME:    Pt/family received spiritual support and reflective conversation in the context of this hospitalization. The pt/family expressed appreciation for the visit and the encouragement that they felt that they have God s support in their struggles.  They requested ongoing Buddhism  support.       PLAN:    Will continue to provide support to pt/family during their hospitalization at least 1x/wk.                                                                                                                                             Germán Kunz  Staff    Pager [phone]

## 2017-07-01 NOTE — PLAN OF CARE
Problem: Goal Outcome Summary  Goal: Goal Outcome Summary  Monitor Paced, VSS. LVAD numbers WNL-no issues or alarms this shift. Incontinent of urine @ times. Daughter staying in room with patient. Tylenol given x1 w/relief. IV ABX as ordered. Notify MD with any issues or concerns.

## 2017-07-01 NOTE — PLAN OF CARE
Problem: Goal Outcome Summary  Goal: Goal Outcome Summary  Transfer  Transferred from: unit 4E at 18:15   Via:bed  Reason for transfer:Patient appropriate for 6C- improved patient condition  Family: Aware of transfer. Daughter is at his bedside and will stay over tonight  Belongings: Sent with patient  Chart: Sent with patient  Medications: Meds from bin sent with pt  Report called from: 4E

## 2017-07-01 NOTE — PROGRESS NOTES
Patient A&O x 4 per daughter, VSS, paced. LVAD numbers WNL, no alarms during shift. LVAD dressing changed. Family at bedside performing cares, voiding well per family. Tylenol given x 1 for pain with relief. IV antibiotics as ordered. Will continue to monitor and notify team of any questions or concerns.

## 2017-07-02 ENCOUNTER — OFFICE VISIT (OUTPATIENT)
Dept: INTERPRETER SERVICES | Facility: CLINIC | Age: 67
End: 2017-07-02

## 2017-07-02 VITALS
RESPIRATION RATE: 16 BRPM | SYSTOLIC BLOOD PRESSURE: 120 MMHG | DIASTOLIC BLOOD PRESSURE: 89 MMHG | BODY MASS INDEX: 26.96 KG/M2 | TEMPERATURE: 97.7 F | HEART RATE: 77 BPM | WEIGHT: 177.3 LBS | OXYGEN SATURATION: 99 %

## 2017-07-02 LAB
ANION GAP SERPL CALCULATED.3IONS-SCNC: 7 MMOL/L (ref 3–14)
BUN SERPL-MCNC: 18 MG/DL (ref 7–30)
CALCIUM SERPL-MCNC: 8.5 MG/DL (ref 8.5–10.1)
CHLORIDE SERPL-SCNC: 112 MMOL/L (ref 94–109)
CO2 SERPL-SCNC: 20 MMOL/L (ref 20–32)
CREAT SERPL-MCNC: 2.04 MG/DL (ref 0.66–1.25)
ERYTHROCYTE [DISTWIDTH] IN BLOOD BY AUTOMATED COUNT: 17.8 % (ref 10–15)
GFR SERPL CREATININE-BSD FRML MDRD: 33 ML/MIN/1.7M2
GLUCOSE BLDC GLUCOMTR-MCNC: 108 MG/DL (ref 70–99)
GLUCOSE SERPL-MCNC: 112 MG/DL (ref 70–99)
HCT VFR BLD AUTO: 28.9 % (ref 40–53)
HGB BLD-MCNC: 9 G/DL (ref 13.3–17.7)
INR PPP: 2.13 (ref 0.86–1.14)
LDH SERPL L TO P-CCNC: 1042 U/L (ref 85–227)
LDH SERPL L TO P-CCNC: 1059 U/L (ref 85–227)
MCH RBC QN AUTO: 29 PG (ref 26.5–33)
MCHC RBC AUTO-ENTMCNC: 31.1 G/DL (ref 31.5–36.5)
MCV RBC AUTO: 93 FL (ref 78–100)
PLATELET # BLD AUTO: 145 10E9/L (ref 150–450)
POTASSIUM SERPL-SCNC: 3.8 MMOL/L (ref 3.4–5.3)
RBC # BLD AUTO: 3.1 10E12/L (ref 4.4–5.9)
SODIUM SERPL-SCNC: 139 MMOL/L (ref 133–144)
WBC # BLD AUTO: 4.5 10E9/L (ref 4–11)

## 2017-07-02 PROCEDURE — 25000132 ZZH RX MED GY IP 250 OP 250 PS 637: Performed by: STUDENT IN AN ORGANIZED HEALTH CARE EDUCATION/TRAINING PROGRAM

## 2017-07-02 PROCEDURE — 85027 COMPLETE CBC AUTOMATED: CPT | Performed by: INTERNAL MEDICINE

## 2017-07-02 PROCEDURE — 99238 HOSP IP/OBS DSCHRG MGMT 30/<: CPT | Mod: GC | Performed by: INTERNAL MEDICINE

## 2017-07-02 PROCEDURE — 25000132 ZZH RX MED GY IP 250 OP 250 PS 637: Performed by: INTERNAL MEDICINE

## 2017-07-02 PROCEDURE — 36415 COLL VENOUS BLD VENIPUNCTURE: CPT | Performed by: INTERNAL MEDICINE

## 2017-07-02 PROCEDURE — 00000146 ZZHCL STATISTIC GLUCOSE BY METER IP

## 2017-07-02 PROCEDURE — 83615 LACTATE (LD) (LDH) ENZYME: CPT | Performed by: INTERNAL MEDICINE

## 2017-07-02 PROCEDURE — 25000128 H RX IP 250 OP 636: Performed by: HOSPITALIST

## 2017-07-02 PROCEDURE — T1013 SIGN LANG/ORAL INTERPRETER: HCPCS | Mod: U3

## 2017-07-02 PROCEDURE — S0138 FINASTERIDE, 5 MG: HCPCS | Performed by: INTERNAL MEDICINE

## 2017-07-02 PROCEDURE — 85610 PROTHROMBIN TIME: CPT | Performed by: INTERNAL MEDICINE

## 2017-07-02 PROCEDURE — 80048 BASIC METABOLIC PNL TOTAL CA: CPT | Performed by: INTERNAL MEDICINE

## 2017-07-02 PROCEDURE — 25000132 ZZH RX MED GY IP 250 OP 250 PS 637: Performed by: HOSPITALIST

## 2017-07-02 RX ORDER — WARFARIN SODIUM 2.5 MG/1
2.5 TABLET ORAL
Status: DISCONTINUED | OUTPATIENT
Start: 2017-07-02 | End: 2017-07-02

## 2017-07-02 RX ORDER — SULFAMETHOXAZOLE AND TRIMETHOPRIM 400; 80 MG/1; MG/1
1 TABLET ORAL 2 TIMES DAILY
Qty: 22 TABLET | Refills: 0 | Status: SHIPPED | OUTPATIENT
Start: 2017-07-02 | End: 2017-01-01

## 2017-07-02 RX ORDER — METOPROLOL SUCCINATE 25 MG/1
25 TABLET, EXTENDED RELEASE ORAL DAILY
Status: DISCONTINUED | OUTPATIENT
Start: 2017-07-02 | End: 2017-07-02 | Stop reason: HOSPADM

## 2017-07-02 RX ORDER — WARFARIN SODIUM 3 MG/1
3 TABLET ORAL
Status: COMPLETED | OUTPATIENT
Start: 2017-07-02 | End: 2017-07-02

## 2017-07-02 RX ORDER — WARFARIN SODIUM 3 MG/1
TABLET ORAL
Qty: 180 TABLET | Refills: 3 | Status: SHIPPED | OUTPATIENT
Start: 2017-07-02 | End: 2017-01-01

## 2017-07-02 RX ADMIN — METOPROLOL SUCCINATE 25 MG: 25 TABLET, FILM COATED, EXTENDED RELEASE ORAL at 18:05

## 2017-07-02 RX ADMIN — SIMETHICONE CHEW TAB 80 MG 80 MG: 80 TABLET ORAL at 12:53

## 2017-07-02 RX ADMIN — WARFARIN SODIUM 3 MG: 3 TABLET ORAL at 18:05

## 2017-07-02 RX ADMIN — SIMETHICONE CHEW TAB 80 MG 80 MG: 80 TABLET ORAL at 16:14

## 2017-07-02 RX ADMIN — ATORVASTATIN CALCIUM 10 MG: 10 TABLET, FILM COATED ORAL at 18:09

## 2017-07-02 RX ADMIN — CALCIUM CARBONATE 600 MG (1,500 MG)-VITAMIN D3 400 UNIT TABLET 1 TABLET: at 08:44

## 2017-07-02 RX ADMIN — ACETAMINOPHEN 650 MG: 325 TABLET, FILM COATED ORAL at 16:33

## 2017-07-02 RX ADMIN — KETOTIFEN FUMARATE 1 DROP: 0.35 SOLUTION/ DROPS OPHTHALMIC at 08:43

## 2017-07-02 RX ADMIN — LOSARTAN POTASSIUM 25 MG: 25 TABLET, FILM COATED ORAL at 08:46

## 2017-07-02 RX ADMIN — SIMETHICONE CHEW TAB 80 MG 80 MG: 80 TABLET ORAL at 08:45

## 2017-07-02 RX ADMIN — ERTAPENEM SODIUM 1 G: 1 INJECTION, POWDER, LYOPHILIZED, FOR SOLUTION INTRAMUSCULAR; INTRAVENOUS at 08:38

## 2017-07-02 RX ADMIN — ALLOPURINOL 100 MG: 100 TABLET ORAL at 08:45

## 2017-07-02 RX ADMIN — PANTOPRAZOLE SODIUM 40 MG: 40 TABLET, DELAYED RELEASE ORAL at 08:44

## 2017-07-02 RX ADMIN — LEVETIRACETAM 750 MG: 750 TABLET, FILM COATED ORAL at 08:44

## 2017-07-02 RX ADMIN — Medication 100 MG: at 08:46

## 2017-07-02 RX ADMIN — FINASTERIDE 5 MG: 5 TABLET, FILM COATED ORAL at 08:46

## 2017-07-02 RX ADMIN — OXYCODONE HYDROCHLORIDE AND ACETAMINOPHEN 500 MG: 500 TABLET ORAL at 08:45

## 2017-07-02 RX ADMIN — TAMSULOSIN HYDROCHLORIDE 0.4 MG: 0.4 CAPSULE ORAL at 08:45

## 2017-07-02 RX ADMIN — MAGNESIUM OXIDE TAB 400 MG (241.3 MG ELEMENTAL MG) 400 MG: 400 (241.3 MG) TAB at 08:44

## 2017-07-02 NOTE — PROGRESS NOTES
Care Coordinator- Discharge Planning     Admission Date/Time:  6/29/2017  Attending MD:  Dafne Aviles, *     Data  Date of initial CC assessment:  Today after page from bedside RN.  Chart reviewed, discussed with interdisciplinary team.   Patient was admitted for:   1. CHF (congestive heart failure), NYHA class IV, chronic, systolic (H)    2. Cerebrovascular accident (CVA) due to embolism of right middle cerebral artery (H)    3. LVAD (left ventricular assist device) present    4. Permanent atrial fibrillation (H)    5. Urinary tract infection, site not specified         Assessment    The following home care plans of care have been arranged on behalf of Mr. Rendon who per interdisciplinary Team who will discharge today:    Please fax discharge orders to [facility]     Ph:  [phone]     Fax: [phone]     Skilled home care RN for resumption of home care services as prior to admission and to assist with the MD team plans of care as outlined in discharge orders.     Skilled home care RN to evaluate medication management, nutrition and hydration evaluation, endurance evaluation, and general status evaluation after discharge from the acute care hospital setting.     Report that  already arranged transport to home via [facility] services.    MAURISIO Juan.S.KIT., P.H.N.,R.N.         Pager

## 2017-07-02 NOTE — DISCHARGE SUMMARY
[facility]   Cardiology II Service / Advanced Heart Failure  Discharge Summary     Sohan Rendon MRN# [id number]   YOB: 1950 Age: 67 year old     DATE OF ADMISSION:  6/29/2017  DATE OF DISCHARGE: 7/2/2017  ADMITTING PROVIDER: Dafne Aviles MD  DISCHARGE PROVIDER: Carrie Butler MD  PRIMARY PROVIDER: Thomas Dill    ADMIT DIAGNOSES:   1. Recurrent embolic strokes  2. Ischemic cardiomyopathy, NYHA IIIB  3. Chronic LVAD thrombus  4. Seizures, non-specified  5. UTI  6. Chronic Normocytic Anemia  7. HEATH on CKD    DISCHARGE DIAGNOSES:   1. Recurrent embolic strokes  2. Ischemic cardiomyopathy, NYHA IIIB  3. Chronic LVAD thrombus  4. Seizures, non-specified  5. UTI  6. Chronic Normocytic Anemia  7. HEATH on CKD     HPI: Please see the detailed H & P by Dr. Ivey from 6/29/2017. From the HPI:    67 year old male with severe ICM s/p palliative LVAD, known LVAD thrombus, recurrent embolic strokes, recurrent GI/urogenital bleeding (goal INR 1.8 - 2.5),  BPH,  recurrent UTIs who [place] [facility] [place] sepsis with non-lactose fermenting gram neg rods.      History is obtained mainly from pts daughter, per patients wishes. He was reportedly in his usual state of health until yesterday when he presented to the ED for hematuria. This problem has been recurrent but started around 3:30 pm yesterday. He also had some associated dysuria. UA showed large blood, leuk esterase, and 50 - 100k non-lactose fermenting gram neg rods. Received a dose of Ceftazidine in the ED and was sent home on cefdinir. At home he became slightly confused and had chills so family called EMS who brought [place] [facility] There he was hemodynamically stable but with a temp of 103F. He was transferred to [facility] for better inpatient LVAD management.      Today he endorses urinary frequency and mild dysuria. His hematuria has improved somewhat. He feels better since received IVF at [facility]. He  does notice a headace. Denies chills. Denies melena or hematochezia. Denies chest pain or syncopal event.      Of note he was hospitalized most recently in May this year for acalculous cholecystitis. A stent was placed on 5/6/17. Denies any abdominal pain today.     PHYSICAL EXAM:  Blood pressure 97/75, pulse 77, temperature 97.7  F (36.5  C), temperature source Oral, resp. rate 16, weight 80.4 kg (177 lb 4.8 oz), SpO2 99 %.     Gen: AA&Ox3, no acute distress  HEENT:AT/ NC, PERRL b/l, EOM grossly intact, mucous membranes pink, moist without plaque or exudate  BACK: no CVA tenderness, no midline bony tenderness  PULM/THORAX: Clear to auscultation bilaterally, no rales/stridor/wheezes  CV: hum of lvad  ABD: obese, soft, nontender, nondistended. Normoactive bowel sounds x 4, no HSM appreciated.  EXT: No LE edema, clubbing or cyanosis. Cool, well perfused No asymmetrical edema or tenderness to palpation in calves bilaterally.     LABS:   Last CBC:   Recent Labs   Lab Test  07/02/17   0807   WBC  4.5   RBC  3.10*   HGB  9.0*   HCT  28.9*   MCV  93   MCH  29.0   MCHC  31.1*   RDW  17.8*   PLT  145*       Last CMP:  Recent Labs   Lab Test  07/02/17   0807   06/29/17   2124   NA  139   < >  138   POTASSIUM  3.8   < >  4.1   CHLORIDE  112*   < >  109   JOSÉ  8.5   < >  8.2*   CO2  20   < >  18*   BUN  18   < >  22   CR  2.04*   < >  1.78*   GLC  112*   < >  152*   AST   --    --   59*   ALT   --    --   20   BILITOTAL   --    --   1.3   ALBUMIN   --    --   3.3*   PROTTOTAL   --    --   7.9   ALKPHOS   --    --   124    < > = values in this interval not displayed.       IMAGING:  Results for orders placed or performed during the hospital encounter of 05/02/17   US Abdomen Limited    Narrative    EXAMINATION: US ABDOMEN LIMITED, 5/3/2017 12:45 AM     COMPARISON: Abdominal CT 4/29/2017    HISTORY: Right upper quadrant abdominal pain    FINDINGS:   Fluid: No evidence of ascites or pleural effusions.    Liver: The liver  demonstrates normal echotexture, measuring 15 cm in  craniocaudal dimension. There is no evidence of a focal mass.There is  no intrahepatic biliary dilatation.    Gallbladder: The gallbladder is well-distended and normal in  morphology. There is mild gallbladder wall thickening, measuring 3.5  mm in thickness. No pericholecystic fluid. Sonographic Guy sign  when performed by the technologist. Layering gallbladder sludge.    Bile Ducts: Both the intra- and extrahepatic biliary system are of  normal caliber.  The common bile duct measures 2.3 mm in diameter.    Pancreas: Pancreas obscured, likely related to LVAD and/or bowel gas.    Kidney: Limited visualization of the kidney. Atrophic right kidney  measuring 8.3 cm, with cortical scarring. No hydronephrosis. Superior  pole renal cyst measuring 3 cm as seen on prior exams.        Impression    IMPRESSION:   1.  Gallbladder sludge with nonspecific mild gallbladder wall  thickening. No cholelithiasis noted.  Differential diagnosis includes  acalculous cholecystitis or gallbladder wall thickening secondary to  hepatic dysfunction.  2.  Atrophic right kidney with cortical scarring.    Dr. Arriaza paged with no call return this AM as of yet.  However, team  is aware of cholecystitis included in the DDX, as evidenced by Dr. Arriaza's note including right upper quadrant pain note, likely  secondary to cholecystitis versus gastritis.  Therefore no further  attempts made to contact team for finding discussion.     I have personally reviewed the examination and initial interpretation  and I agree with the findings.    ABEL PURVIS MD   XR Chest 2 Views    Narrative    EXAM: XR CHEST 2 VW  5/3/2017 12:22 AM      HISTORY: Right lower chest/RUQ pain    COMPARISON: 3/27/2017    FINDINGS: Single AP view of the chest.    Left chest wall automatic implantable cardiac defibrillator with  stable lead position. LVAD in expected position.    Nondisplaced sternotomy wires. Perihilar  opacities. Low lung volumes.  No focal pulmonary airspace opacity. Small pleural effusions.      Impression    IMPRESSION:   Perihilar opacities, likely crowding of vessels related to low lung  volumes. Small pleural effusions.    I have personally reviewed the examination and initial interpretation  and I agree with the findings.    JENNIFER RIVERO   CT Chest Abdomen Pelvis w/o Contrast    Narrative    EXAMINATION: CT CHEST ABDOMEN PELVIS W/O CONTRAST, 5/3/2017 11:17 AM    TECHNIQUE:  Helical CT images from the thoracic inlet through the  symphysis pubis were obtained  without contrast.     COMPARISON: CT abdomen pelvis dated 4/29/2017 and CT chest dated  8/14/2014 and 7/19/2014    HISTORY: elevated WBC and inflammatory markers of unclear etiology.  new productive cough    FINDINGS:    Chest: Stable positioning of LVAD. Cardiomegaly is noted. Cardiac  pacemaker device is noted in the left chest wall with leads in place.  Atherosclerotic calcification of thoracic aorta without aneurysm. Main  Pulmonary arteries are normal in caliber.  Central tracheobronchial tree it is patent. There is no pericardial  effusion. Small bilateral pleural effusions are noted. Right basal  atelectasis/consolidation is noted. Left basilar atelectasis is also  noted.    Multiple scattered bilateral pulmonary nodules are again identified.  For example: Stable 5 mm nodule within the anterior segment of the  right upper lobe (image 57, series 2).  Stable 3 mm subpleural nodule within the lateral aspect of the right  upper lobe (image 56, series 2).  Stable 5 mm nodule within the right middle lobe (image 68, series 2).  No new suspicious pulmonary nodules. No other focal pulmonary opacity.  1.5 cm pneumatocele or cyst at the right lung base.    Abdomen and pelvis:   Limited evaluation of upper abdomen due to streak artifacts from the  right. There is mild inflammatory fat stranding in the right upper  abdomen, surrounding the second portion of  the duodenum and extending  along the anterior pararenal fascia and posterior to the ascending  colon (image 131, series 4). No localized fluid collection or free air  is identified. An adjacent head is slight uncinate process of the  pancreas demonstrates fatty infiltration without obvious inflammatory  changes.  Small amount of fluid is seen along the tip of the appendix, with  decreased inflammatory changes and dilation of the appendix since  prior CT dated 2/11/2017. Remainder of the appendix has a normal  appearance.  Colonic diverticulosis is noted without focal diverticulitis. No other  areas of bowel wall thickening or bowel obstruction.  Unchanged lobulated appearance of the spleen, likely related to prior  infarctions. No focal liver lesion on this noncontrast study.  Gallbladder is distended without wall thickening or calculi or  pericholecystic fluid. Pancreas is atrophic. Both adrenal glands are  unremarkable. Multiple exophytic cysts are again noted in the kidneys  bilaterally, not significantly changed since prior study. Unchanged  1.3 cm mildly hyperdense exophytic lesion in the upper pole of the  right kidney since CT dated 9/16/2016, likely representing a  hemorrhagic/proteinaceous cysts.  Tiny nonobstructing calculi are noted in the kidneys bilaterally.  Unchanged mild left renal pelvocaliectasis and mild prominence of left  ureter. Urinary bladder is distended with mild wall thickening,  unchanged since prior study.  Mild hypertrophia median lobe of the prostate. Seminal vesicles appear  unremarkable.  No intra-abdominal or pelvic lymphadenopathy. Atherosclerotic  calcification of the abdominal aorta and its branches is noted without  aneurysm.    Bones and soft tissues: Old fracture of the right femoral neck is  again identified with associated deformity. Multilevel degenerative  changes are noted in the spine. No suspicious osseous lesions. No  significant change in partially visualized  periosteal reaction along  the right proximal femur. No suspicious or aggressive osseous lesions.      Impression    IMPRESSION:   1. Inflammatory fat stranding surrounding the second portion of  duodenum with extension along the right anterior pararenal fascia,  likely representing  duodenitis. No localized fluid collections or  free air to suggest perforation. Fatty infiltration of the adjacent  head/uncinate process of pancreas without obvious inflammatory  changes.  2. Small amount of fluid and fat stranding surrounding the tip of the  appendix, however significantly improved since CT dated 2/11/2017.  Remainder of appendix has a normal appearance.  3. Mild left renal pelvocaliectasis and left hydroureter, unchanged  since prior study. Tiny nonobstructing calculi within the kidneys  bilaterally. No ureteric calculi.  4. Stable scattered sub-5 mm pulmonary nodules in the right lung since  CT dated 7/19/2014.  5. Small bilateral pleural effusions. Right basal  atelectasis/consolidation. Superimposed infection cannot be excluded.    I have personally reviewed the examination and initial interpretation  and I agree with the findings.    JESSICA DE LA CRUZ MD   CT Chest Abdomen Pelvis w/o Contrast    Narrative    EXAMINATION: CT CHEST ABDOMEN PELVIS W/O CONTRAST  5/6/2017 4:11 PM      CLINICAL HISTORY: concern for development of infection in abdomen or  by drive line    COMPARISON: 5/3/2017        PROCEDURE COMMENTS: CT of the chest, abdomen, and pelvis was performed  without intravenous and oral contrast. Axial MIP  images of the chest,  and coronal and sagittal reformatted images of the chest, abdomen, and  pelvis obtained.    FINDINGS:    Support devices: Left chest implanted cardiac defibrillator, leads  terminating in the right atrium and right ventricle. Left ventricular  assist device. No associated fat stranding, device failure, or acute  thrombosis. Sternotomy wires.    Chest:  Mucosal debris within the mid  trachea. The peripheral tracheobronchial  tree is clear. Consolidative opacities of the lower lobes, right  greater the left with air bronchograms. 5 x 6 mm groundglass pulmonary  nodule within the lateral segment of the right middle lobe (series 2,  image 59). This previously measured 8 x 6 mm. Mild dilatation of the  main pulmonary artery, measuring up to 3.4 cm in diameter is  (previously measuring 3.0 cm).    Low-attenuation nodule which occupies the majority of the right lobe  of the thyroid gland, measuring 1.8 x 2.2 cm. No coarse  calcifications. There are no abnormally sized axillary, hilar, or  mediastinal lymph nodes.    Significant cardiomegaly. Moderate atherosclerotic calcifications of  the coronary arteries.    Abdomen/pelvis:  Gallbladder is significantly distended without radiopaque stones or  adjacent fat stranding. Extensive beam hardening artifact from left  ventricular assist device interferes with optimal visualization.  Significant fatty atrophy of the pancreas. Significant interval  improvement in fat stranding adjacent to the second portion of the  duodenum. Renal cortical irregularity and atrophy. Multiple  cortical-based renal cysts, the largest which involves the interpolar  aspect the left kidney measures 4.1 x 4.3 cm. No soft tissue  components or internal calcifications.    Atherosclerotic calcifications of the abdominal and pelvic arterial  vasculature. No definitive aneurysmal dilatation. Fat-containing left  inguinal hernia. Mcarthur catheter within the decompressed urinary  bladder. Thickening of the urinary bladder, measuring up to 1.0 cm in  diameter. No adjacent fat stranding. Antidependent air within the  urinary bladder. Again noted is mucosal thickening of the rectum,  measuring up to 9 mm in diameter.     The liver, spleen, and appendix are normal in appearance.  The adrenal glands are normal in appearance.    There are no abnormally dilated or thickened loops of small bowel  or  colon.    There is no free air or free fluid. There are no abnormally sized  lymph nodes.    Bones:   Nondisplaced right lateral third clavicle fracture, unchanged from  prior study. 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  No significant change. Lower lumbar spine facet osteoarthropathy.  Additional degenerative changes of the spine, including anterior  osteophytes, particularly in the lower lumbar region.      Impression    IMPRESSION:  1. Although limited by lack of intravenous contrast, no definitive CT  evidence of infection associated with the left ventricular assist  device.  2. No significant change in opacities of the lower lobes, right  greater the left, which may represent atelectasis with possible  superimposed pneumonia.  3. Persistent thickening of the rectum, which may be due to stool  burden and or proctitis.   5. Moderate cardiomegaly. Mild dilatation of the main pulmonary  artery, measuring up to 3.4 cm in diameter. The latter finding may  represent some degree of underlying pulmonary arterial hypertension.  6. Near-complete resolution of previously demonstrated fat stranding  adjacent to the second portion of the duodenum.  7. Significant distention of the gallbladder, which may be due to  fasting state. No radiopaque gallstones noted. No adjacent fat  stranding or CT findings to suggest acute cholecystitis.  8. Hypoattenuating right thyroid nodule, which occupies the majority  of the gland. 9. Groundglass pulmonary nodule within the right middle  lobe, which may be infectious. No significant change from prior study  5/3/2017.    I have personally reviewed the examination and initial interpretation  and I agree with the findings.    JENNIFER KUNZ MD   US Abdomen Limited    Narrative    EXAMINATION: US ABDOMEN LIMITED, 5/7/2017 11:58 AM     COMPARISON: 5/3/2017    HISTORY: Abdominal pain    FINDINGS:   Fluid: No evidence of ascites or pleural effusions.    Liver: The  liver demonstrates normal echotexture, measuring 16 cm in  craniocaudal dimension. There is no evidence of a focal mass. There is  no intrahepatic biliary dilatation.    Gallbladder: Gallbladder is well-distended. Layering sludge again  seen. No echogenic gallstone seen. Gallbladder thickening, measuring  7.4 mm, increased from 3.5 mm on 5/3/2017. Mild pericholecystic fluid,  is new. No gallbladder wall hyperemia. Sonographic Guy's sign when  performed by the technologist.    Bile Ducts: Both the intra- and extrahepatic biliary system are of  normal caliber.  The common bile duct measures 4 mm in diameter.    Pancreas: Pancreas is obscured by overlying bowel gas    Kidney: The right kidney measures 8 cm long. The right kidney appears  atrophic with cortical scarring. Redemonstrated simple appearing cyst  in the superior pole. No hydronephrosis.    Aorta and IVC: The visualized portions of the aorta and IVC are  unremarkable.       Impression    IMPRESSION:   Increased gallbladder wall thickening of 7.4 mm, previously 3.5, and  new pericholecystic fluid, with redemonstrated gallbladder sludge.  Differential remains acalculus cholecystitis versus changes secondary  to hepatic dysfunction.    I have personally reviewed the examination and initial interpretation  and I agree with the findings.    JENNIFER RIVERO   XR Abdomen Port 1 View    Narrative    EXAM: XR ABDOMEN PORT F1 VW  5/8/2017 11:21 AM      HISTORY: distended abd, r/o obstruction    COMPARISON: CT cap 5/6/2017    FINDINGS: Single portable supine view of the abdomen. Air-filled  nondistended small bowel and colon with a nonobstructive bowel gas  pattern.  Mild colonic stool burden. Mcarthur catheter and LVAD appear  unchanged. Partially visualized bibasilar opacities. Partially  visualized chronic displaced right femoral neck fracture dating back  to at least 2015.      Impression    IMPRESSION: Nonobstructive bowel gas pattern.    I have personally reviewed  the examination and initial interpretation  and I agree with the findings.    STEPHANIE RODRÍGUEZ MD   XR Surgery KELVIN G/T 5 Min Fluoro w Stills    Narrative    This exam was marked as non-reportable because it will not be read by a   radiologist or a [facility] non-radiologist provider.             XR Abdomen Port 1 View    Narrative    EXAM: XR ABDOMEN PORT F1 VW  5/10/2017 2:08 PM      HISTORY: eval for ileus, SBO    COMPARISON: 5/8/2017    FINDINGS: Partially visualized LVAD and Mcarthur catheter are unchanged  in position. New biliary stent projecting with the distal tip  projecting over the second portion of duodenum. Air-filled  nondistended small bowel loops scattered throughout the abdomen.  Colonic gas is present. No pneumatosis or portal venous gas.      Impression    IMPRESSION: New biliary stent appears in good position. Nonobstructive  bowel gas pattern.    I have personally reviewed the examination and initial interpretation  and I agree with the findings.    ISRAEL PEOPLES MD   XR Abdomen Port 1 View    Narrative    EXAM: XR ABDOMEN PORT F1 VW  5/10/2017 4:02 PM      HISTORY: confirm NG tube placement    COMPARISON: Same day radiograph    FINDINGS: New NG tube tip projecting over the stomach and sidehole  just below the gastroesophageal junction. Partially visualized LVAD,  epigastric surgical clips, median sternotomy wires and Mcarthur catheter.  Unchanged biliary stent position. Nonobstructive bowel gas pattern.       Impression    IMPRESSION: New NG tube tip at the level of the stomach and sidehole  just below the gastroesophageal junction.  Again seen are mildly prominent loops of small bowel without definite  dilation.     I have personally reviewed the examination and initial interpretation  and I agree with the findings.    ABEL PURVIS MD     *Note: Due to a large number of results and/or encounters for the requested time period, some results have not been displayed. A complete set of results can be  found in Results Review.       PROCEDURES: None    CONSULTATIONS: None    HOSPITAL COURSE BY PROBLEM:     1. Sepsis secondary to ESBL E.Coli UTI. Patient initially presented to urgent care with symptoms of hematuria and dysuria on 6/28, was diagnosed with a UTI and sent home on cefdinir. Patient's symptoms worsened overnight and he presented to [facility] and was found to have sepsis secondary to a UTI with an HEATH. Patient was then transferred to the [facility] for further management. Patient was initially started on meropenem, and deescalated to ertapenem per sensitivities. For discharge patient was de-escalated to bactrim for a 14 day course.    2. HEATH on CKD. Patient was thought to have an HEATH due to hypoperfusion from sepsis. Patient's creatinine improved with antibiotics and conservative management.    PENDING RESULTS:     DISCHARGE MEDICATIONS:  Current Discharge Medication List      START taking these medications    Details   sulfamethoxazole-trimethoprim (BACTRIM/SEPTRA) 400-80 MG per tablet Take 1 tablet by mouth 2 times daily  Qty: 22 tablet, Refills: 0    Associated Diagnoses: Urinary tract infection, site not specified         CONTINUE these medications which have CHANGED    Details   warfarin (COUMADIN) 3 MG tablet Take 3mgtonight and tomorrow night. Follow up with coumadin clinic on Tuesday for further dosing  Qty: 180 tablet, Refills: 3    Associated Diagnoses: Cerebrovascular accident (CVA) due to embolism of right middle cerebral artery (H)         CONTINUE these medications which have NOT CHANGED    Details   ketotifen (ZADITOR/REFRESH ANTI-ITCH) 0.025 % SOLN ophthalmic solution Place 1 drop into both eyes 2 times daily  Qty: 1 Bottle, Refills: 11    Comments: Instructions in Armenian if possible  Associated Diagnoses: Allergic conjunctivitis, bilateral      ASPIRIN NOT PRESCRIBED (INTENTIONAL) Please choose reason not prescribed, below  Qty: 0 each, Refills: 0    Associated Diagnoses: LVAD (left  ventricular assist device) present (H)      SENEXON-S 8.6-50 MG per tablet TAKE 1-2 TABLETS BY MOUTH 2 TIMES DAILY AS NEEDED FOR CONSTIPATION  Qty: 60 tablet, Refills: 3    Comments: DX Code Needed  .  Associated Diagnoses: Slow transit constipation      metoprolol (TOPROL-XL) 25 MG 24 hr tablet TAKE 1 TABLET (25 MG) BY MOUTH EVERY EVENING  Qty: 90 tablet, Refills: 3    Comments: REFILL PLEASE  Associated Diagnoses: LVAD (left ventricular assist device) present (H)      bisacodyl (DULCOLAX) 10 MG Suppository Place 1 suppository (10 mg) rectally daily as needed for constipation  Qty: 5 suppository, Refills: 1    Associated Diagnoses: Drug-induced constipation      magnesium oxide (MAG-OX) 400 MG tablet Take 1 tablet (400 mg) by mouth 2 times daily  Qty: 14 tablet, Refills: 0    Associated Diagnoses: Abdominal distention; Drug-induced constipation      finasteride (PROSCAR) 5 MG tablet Take 1 tablet (5 mg) by mouth daily  Qty: 90 tablet, Refills: 3    Associated Diagnoses: Incomplete bladder emptying      atorvastatin (LIPITOR) 10 MG tablet TAKE 1 TABLET (10 MG) BY MOUTH DAILY  Qty: 90 tablet, Refills: 3    Associated Diagnoses: Hyperlipidemia LDL goal <100      calcium carbonate-vitamin D 500-400 MG-UNIT TABS per tablet Take 1 tablet by mouth daily  Qty: 90 tablet, Refills: 3    Associated Diagnoses: Cardiac arrhythmia, unspecified cardiac arrhythmia type      simethicone (MYLICON) 80 MG chewable tablet Take 1 tablet (80 mg) by mouth 4 times daily  Qty: 180 tablet, Refills: 5    Associated Diagnoses: Slow transit constipation      oxyCODONE (ROXICODONE) 5 MG IR tablet Take 1 tablet (5 mg) by mouth every 4 hours as needed for moderate to severe pain  Qty: 30 tablet, Refills: 0    Associated Diagnoses: 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 order for DME Equipment being ordered: Cushioned heel boots.  Qty: 2 each, Refills: 0    Associated Diagnoses: 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)      guaiFENesin-dextromethorphan (ROBITUSSIN DM) 100-10 MG/5ML syrup Take 5 mLs by mouth every 4 hours as needed for cough  Qty: 560 mL, Refills: 3    Associated Diagnoses: Chronic cough      losartan (COZAAR) 25 MG tablet Take 1 tablet (25 mg) by mouth daily  Qty: 45 tablet, Refills: 3    Associated Diagnoses: CHF (congestive heart failure), NYHA class IV, chronic, systolic (H)      allopurinol (ZYLOPRIM) 100 MG tablet Take 1 tablet (100 mg) by mouth daily  Qty: 90 tablet, Refills: 3    Associated Diagnoses: 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 order for DME Equipment being ordered: Wheelchair, manual, with elevated leg rests and tilt in space back.  Please fax to [facility]; I called them 11/26/16 and they requested the new order.  Face to face is in my 10/26/16 note.  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 order for DME Equipment being ordered: Wheelchair, manual.  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 order for DME Equipment being ordered: Hospital Bed, fully electric.  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); CHF (congestive heart failure), NYHA class IV, chronic, systolic (H)      loratadine (CLARITIN) 10 MG tablet Take 1 tablet (10 mg) by mouth daily  Qty: 90 tablet, Refills: 3    Associated Diagnoses: Allergic rhinitis, unspecified allergic rhinitis type      levETIRAcetam (KEPPRA) 750 MG tablet Take 1 tablet (750 mg) by mouth 2 times daily  Qty: 180 tablet, Refills: 3    Associated Diagnoses: Convulsions, unspecified convulsion type (H)      ascorbic acid (VITAMIN C) 500 MG tablet Take 1 tablet (500 mg) by mouth daily With iron pill  Qty: 90 tablet, Refills: 3      blood glucose monitoring (NO BRAND SPECIFIED) test strip Use to test blood sugar 2 times daily or as directed.  Qty: 1 Box, Refills: 3    Associated Diagnoses: Type 2 diabetes mellitus with stage 3 chronic kidney disease (H)      !! order for DME Equipment being ordered: Reclining manual w/c with bilateral elevating leg rests.  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  Qty: 60 g, Refills: 3      !! order for DME Equipment being ordered: Bilateral leg supports for manual wheelchair.  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  Qty: 1 Box, Refills: 3    Associated Diagnoses: Diabetes mellitus, type 2 (H)      !! ORDER FOR DME Equipment being ordered: Lift chair.    Please see indications and face-to-face encounter details in 2/3/15 Office Visit note.  Qty: 1 Device, Refills: 0    Associated Diagnoses: Fracture of femoral neck, right, closed, initial encounter; Stroke (H); CHF (congestive heart failure), NYHA class IV (H)      acetaminophen (TYLENOL) 325 MG tablet Take 2 tablets (650 mg) by mouth every 6 hours  Qty: 100 tablet, Refills: 0    Associated Diagnoses: Femoral neck fracture, right, closed, initial encounter      bisacodyl (DULCOLAX) 5 MG EC tablet Take 1 tablet (5 mg) by mouth daily as needed for constipation  Qty: 20 tablet, Refills: 1    Associated Diagnoses: Constipation       !! - Potential duplicate medications found. Please discuss with provider.      STOP taking these medications       cefdinir (OMNICEF) 300 MG capsule Comments:   Reason for Stopping:               DISCHARGE DISPOSITION: Sohan Rendon will discharge to home in stable condition.     DISCHARGE INSTRUCTIONS:    Discharge Procedure Orders  Basic metabolic panel   Standing Status: Future  Standing Exp. Date: 08/31/17     INR   Standing Status: Future  Standing Exp. Date: 08/31/17     Reason for your hospital stay   Order Comments: Sepsis secondary to urinary tract infection     Adult [facility] Follow-up and recommended labs and tests   Order Comments: 1. PCP within 1-2 weeks  2. VAD clinic within 1-2 weeks  3. Coumadin clinic Tuesday, 7/4/2017    Appointments on [place] and/or [facility] (with [facility] or  [facility] provider or service). Call [phone] if you haven't heard regarding these appointments within 7 days of discharge.     Follow Up and recommended labs and tests   Order Comments: 1. Follow up with coumadin clinic 7/4/2017; obtain INR  2. 7/4/2017, [facility]     Activity   Order Comments: Your activity upon discharge: activity as tolerated   Order Specific Question Answer Comments   Is discharge order? Yes      Full Code     Diet   Order Comments: Follow this diet upon discharge: Cardiac   Order Specific Question Answer Comments   Is discharge order? Yes          For PCP  1. Please check to ensure creatinine is downtrending on follow up lab  2. Please check INR for anticoagulation    60 minutes spent in discharge, including >50% in counseling and coordination of care, medication review and plan of care recommended on follow up. Questions were answered.     It was our pleasure to care for Sohan Rendon during this hospitalization. Please do not hesitate to contact me should there be questions regarding the hospital course or discharge plan.      Patient seen and discussed with Dr. Butler who agrees with my assessment and plan.    Avtar Alvarez MD PhD  Internal Medicine PGY-2  [phone]  7/2/2017

## 2017-07-02 NOTE — PROGRESS NOTES
Social Work Services Discharge Note      Patient Name:  Sohan Rendon     Anticipated Discharge Date:  7/2/2017    Discharge Disposition:   Home with resumption of home care    Following MD:  Roddy Mercer and Avtar Alvarez      Additional Services/Equipment Arranged:  Writer arranged stretcher transportation via [employer] ([phone]) for 19:30 pm.  Spoke with Care Coordinator, Tanesha Araujo.  She has contacted pt's home care agency for resumption of care.  Per Carmen Almendarez, Care Coordinator will complete the PCS form for pt's stretcher transportation.     Persons notified of above discharge plan:  Reviewed transportation arrangements with pt's bedside nurse, Sary ([phone]), Sary will relay information to pt's family (daughter is at bedside).    CTS Handoff completed:  NO    Medicare Notice of Rights provided to the patient/family:  NO    CAMRON Vallejo, LICSW  Pager) [phone]

## 2017-07-02 NOTE — PLAN OF CARE
Problem: Goal Outcome Summary  Goal: Goal Outcome Summary  Outcome: No Change  D/I/A:  Patient was admitted here for urosepsis.  Hx of LVAD-HM II, CVA with L-side weakness.  Patient has his daughter at bedside and very supportive with cares.  Patient slept btw cares and his LDH value was 1042 and provider was notified will follow up another LDH lab draw.   P: Will continue with cares and notify MD of status changes.

## 2017-07-02 NOTE — PROGRESS NOTES
Called by RN earlier in shift due to concern for VAD readings: Flow +++ /  PI 4.4 / power 7.7.  Per chart, INR therapeutic in AM. Patient asymptomatic per daughter in room. VAD coordinator called and VAD monitor Hx checked showing Flow ~8 earlier in day. LDH ordered STAT, noted to be 1042 from 993 earlier in the day. Re check within 1 hr demonstrated VAD readings back to baseline Flow ~5 / power 6. Will order LDH for AM and continue close monitoring. If recurrent episode overnight will call VAD coordinator to come in for waveform evaluation and will start heparin gtt (though likely low benefit as patient already adequately anticoagulated based on INR).

## 2017-07-02 NOTE — PLAN OF CARE
Problem: Goal Outcome Summary  Goal: Goal Outcome Summary  Outcome: No Change  D/I:  LVAD pt in with UTI has daughter in the room helping him. He has had some stool and urine incontinence and she says he is feeling chilled. Temp 98.9 ax. Tylenol given. Pt colleen lifted up into the chair for a few hours. LVAD noted to have +++ instead of flows and naranjo into the 8s. MDs called and LDH to be drawn. Presently flows in the 5s and naranjo in the 6s. SR/ST 60s-100s with 0-10 PVCs, frequent PACs and some pacing.   A: Stable feeling feverish.  P: LDH to be drawn. Monitor and assist as needed.

## 2017-07-03 ENCOUNTER — CARE COORDINATION (OUTPATIENT)
Dept: CARDIOLOGY | Facility: CLINIC | Age: 67
End: 2017-07-03

## 2017-07-03 ENCOUNTER — CARE COORDINATION (OUTPATIENT)
Dept: GERIATRIC MEDICINE | Facility: CLINIC | Age: 67
End: 2017-07-03

## 2017-07-03 ENCOUNTER — ANTICOAGULATION THERAPY VISIT (OUTPATIENT)
Dept: ANTICOAGULATION | Facility: CLINIC | Age: 67
End: 2017-07-03

## 2017-07-03 ENCOUNTER — CARE COORDINATION (OUTPATIENT)
Dept: CARE COORDINATION | Facility: CLINIC | Age: 67
End: 2017-07-03

## 2017-07-03 DIAGNOSIS — Z95.811 LVAD (LEFT VENTRICULAR ASSIST DEVICE) PRESENT (H): ICD-10-CM

## 2017-07-03 DIAGNOSIS — Z79.01 LONG-TERM (CURRENT) USE OF ANTICOAGULANTS: ICD-10-CM

## 2017-07-03 NOTE — PROGRESS NOTES
Called patient/caregiver to check in 24hours post discharge. Pt reports VAD parameters are okay but did notice some high flows and will continue to monitor. Weight is stable. Care giver reports that patient is more tired than usual but is not concerned about this. Reviewed medications and answered any questions. Patient reports sleeping more than usual and has normal anxiety since being home with LVAD. Patient is at baseline with his ability to provide selfcare, he requires a lot of care from his daughters who do most of his cares. Plan to check in with pt tomorrow and discuss warfarin dosing plan after receiving INR lab result.     No new concerns following this discharge from the hospital. Encouraged pt to page VAD Coordinator with any issues or questions. Pt/caregiver verbalizes understanding.

## 2017-07-03 NOTE — PROGRESS NOTES
AMANDA was notified client discharged from the hospital 07/02/17. AMANDA spoke with client's daughter Lisa ([phone]) today. She states client is feeling a little better. Still has some blood in his urine. He is on antibiotics for UTI. She states a nurse from [facility] is scheduled to see client today. No questions or concerns at this time.   Nena Salazar RN  [employer]   [phone]

## 2017-07-03 NOTE — MR AVS SNAPSHOT
[facility]   7/3/2017   Anticoagulation Therapy Visit    Description:  67 year old male   Provider:  Sarah Martinez, RN   Department:  [facility]           INR as of 7/3/2017     Today's INR       Anticoagulation Summary as of 7/3/2017     INR goal    Prior goal 1.8-2.5   Today's INR    Full instructions No maintenance plan   Next INR check 7/3/2017    Indications   LVAD (left ventricular assist device) present [Z95.811]  Long-term (current) use of anticoagulants [Z79.01] [Z79.01]         July 2017 Details    Sun Mon Tue Wed Thu Fri Sat           1                 2               3      See details      4               5               6               7               8                 9               10               11               12               13               14               15                 16               17               18               19               20               21               22                 23               24               25               26               27               28               29                 30               31                     Date Details   07/03 This INR check       Date of next INR:  7/3/2017

## 2017-07-03 NOTE — PROGRESS NOTES
Dates of hospitalization: 6/29/17 to 7/2/17  Reason for hospitalization: Sepsis secondary to UTI  Procedures performed: None  Vitamin K or FFP administered? None  Inpatient warfarin doses added to calendar? Yes  Medication changes at discharge: Pt is starting Bactrim 1 tablet BID and D/C Cefdinir  Warfarin dosing after DC: 3mg 7/2  Patient discharged on Lovenox? No  Next INR date: 7/3/17  Where is the patient discharging to? (home, TCU, staying locally, etc.): Home  Will patient have home care? Yes [facility] will resume care

## 2017-07-03 NOTE — PLAN OF CARE
Problem: Goal Outcome Summary  Goal: Goal Outcome Summary        D/I: Patient's vital signs and LVAD values have been stable. His rhythm was SR 65-90 with 0-6 PVC's/min (MF), 0-7 PAC's/min and up to 28 paced beats. He denied pain. His LVAD dressing was changed. He was unable to void since 15:00 and his daughter was concerned but did not want to tell the doctors or to straight cath him. At 15:00 he voided 300 cc. She was told if he does not void by the morning she should call the doctor. An  was present when his discharge orders were read. The patient verbalized his understanding.  A: See flow sheets for further assessment details  P: Continue to monitor vital signs, LVAD values, labs, and rhythm. Notify MD with any changes or concerns

## 2017-07-03 NOTE — PLAN OF CARE
Problem: Goal Outcome Summary  Goal: Goal Outcome Summary  DISCHARGE   Discharged to: Home  Via: [employer] ([employer]) stretcher at 19:50  Accompanied by: daughter  Discharge Instructions: diet, activity, medications, follow up appointments, when to call the MD, and what to watchout for (i.e. s/s of infection, increasing SOB, palpitations, chest pain,)  Prescriptions: A hard copy for bactrim was given the his daughter since the discharge pharmacy closed at 18:00 and will be filled tomorrow by [facility] pharmacy in [facility]. Medication list reviewed & sent with pt  Follow Up Appointments: arranged; information given  Belongings: All sent with pt  IV: out  Telemetry: off  Pt exhibits understanding of above discharge instructions; all questions answered.  Discharge Paperwork: faxed

## 2017-07-03 NOTE — PROGRESS NOTES
Spoke with Almaz--she states she asked the home health nurse to see Sohan tomorrow, 7/4/17.  He will take warfarin 3 mg tonight, 7/3/17, and ask the HHN to call the LVAD coordinator on call with the result on 7/4/17.  Spoke LVAD coordinator, Rik, who is on call this weekend.  Sohan started bactrim, which can interfere with warfarin.  Sarah Osborne RN

## 2017-07-04 ENCOUNTER — CARE COORDINATION (OUTPATIENT)
Dept: CARDIOLOGY | Facility: CLINIC | Age: 67
End: 2017-07-04

## 2017-07-04 LAB — INR PPP: 1.4

## 2017-07-04 NOTE — PROGRESS NOTES
INR today is 1.4.   Instructed patient's caregiver to give him 6mg coumadin today and follow-up with coumadin clinic tomorrow for further instructions.

## 2017-07-05 ENCOUNTER — ANTICOAGULATION THERAPY VISIT (OUTPATIENT)
Dept: ANTICOAGULATION | Facility: CLINIC | Age: 67
End: 2017-07-05

## 2017-07-05 ENCOUNTER — TELEPHONE (OUTPATIENT)
Dept: FAMILY MEDICINE | Facility: CLINIC | Age: 67
End: 2017-07-05

## 2017-07-05 DIAGNOSIS — Z79.01 LONG-TERM (CURRENT) USE OF ANTICOAGULANTS: ICD-10-CM

## 2017-07-05 DIAGNOSIS — Z95.811 LVAD (LEFT VENTRICULAR ASSIST DEVICE) PRESENT (H): ICD-10-CM

## 2017-07-05 LAB — INTERPRETATION ECG - MUSE: NORMAL

## 2017-07-05 PROCEDURE — G0179 MD RECERTIFICATION HHA PT: HCPCS

## 2017-07-05 NOTE — MR AVS SNAPSHOT
[facility]   7/5/2017   Anticoagulation Therapy Visit    Description:  67 year old male   Provider:  Estuardo Zepeda, [facility]   Department:  [facility]           INR as of 7/5/2017     Today's INR 1.4! (7/4/2017)      Anticoagulation Summary as of 7/5/2017     INR goal    Prior goal 1.8-2.5   Today's INR 1.4! (7/4/2017)   Full instructions 7/5: 6 mg   Next INR check 7/6/2017    Indications   LVAD (left ventricular assist device) present [Z95.811]  Long-term (current) use of anticoagulants [Z79.01] [Z79.01]         July 2017 Details    Sun Mon Tue Wed Thu Fri Sat           1                 2               3               4               5      6 mg   See details      6            7               8                 9               10               11               12               13               14               15                 16               17               18               19               20               21               22                 23               24               25               26               27               28               29                 30               31                     Date Details   07/05 This INR check       Date of next INR:  7/6/2017         How to take your warfarin dose     To take:  6 mg Take 2 of the 3 mg tablets.

## 2017-07-05 NOTE — TELEPHONE ENCOUNTER
Spoke with SHAWN Knowles [facility] and given verbal ok on SN 2w1, 1w6, 4 PRN per RN protocol.    She states she already has the next INR visit set up per coumadin clinic.    Concetta Burr RN

## 2017-07-05 NOTE — TELEPHONE ENCOUNTER
Reason for call:  Home Healthcare Reason for Call:  Home Health Care    Benson with [facility] called regarding (reason for call): SN orders    Orders are needed for this patient. SN    PT: NA    OT: NA    Skilled Nursing: SN continuation of care orders. Also requesting a 2nd INR to be drawn 7/6/17 stating pt's previous INR was low.    Pt Provider: Thomas Dill      Phone Number Homecare Nurse can be reached at: [phone]    Can we leave a detailed message on this number? YES    Phone number patient can be reached at: Home number on file [phone] (home)    Best Time: ANY    Call taken on 7/5/2017 at 9:47 AM by Mary Jimenez        Can we leave a detailed message on this number?  YES

## 2017-07-05 NOTE — PROGRESS NOTES
ANTICOAGULATION FOLLOW-UP CLINIC VISIT    Patient Name:  Sohan Rendon  Date:  7/5/2017  Contact Type:  Telephone    SUBJECTIVE:        OBJECTIVE    INR   Date Value Ref Range Status   07/04/2017 1.4  Final     Chromogenic Factor 10   Date Value Ref Range Status   08/10/2014 24 (L) 70 - 130 % Final     Comment:     Therapeutic Range:  A Chromogenic Factor 10 level of approximately 20-40%   inversely correlates with an INR of 2-3 for patients receiving Warfarin.   Chromogenic Factor 10 levels below 20% indicate an INR greater than 3 and   levels above 40% indicate an INR less than 2.       Factor 2 Assay   Date Value Ref Range Status   07/21/2014 25 (L) 60 - 140 % Final     Comment:     Analyte Specific Reagents (ASRs) are used in many laboratory tests necessary   for   standard medical care and generally do not require FDA approval.  This test   was   developed and its performance characteristics determined by [facility].  It has not been cleared or approved by   the [place] Food and Drug Administration.         ASSESSMENT / PLAN  INR assessment SUB    Recheck INR In: 2 DAYS    INR Location Homecare INR      Anticoagulation Summary as of 7/5/2017     INR goal    Prior goal 1.8-2.5   Today's INR 1.4! (7/4/2017)   Maintenance plan No maintenance plan   Full instructions 7/5: 6 mg   Plan last modified Blanca Bah, RN (5/30/2017)   Next INR check 7/6/2017   Priority INR   Target end date Indefinite    Indications   LVAD (left ventricular assist device) present [Z95.811]  Long-term (current) use of anticoagulants [Z79.01] [Z79.01]         Anticoagulation Episode Summary     INR check location     Preferred lab     Send INR reminders to [facility]    Comments Goal Range is 1.8-2.3  [facility] comes out to draw INR  Daughter Almaz Ph [phone]      Anticoagulation Care Providers     Provider Role Specialty Phone number    Evon Lemons MD Responsible  Cardiology [phone]            See the Encounter Report to view Anticoagulation Flowsheet and Dosing Calendar (Go to Encounters tab in chart review, and find the Anticoagulation Therapy Visit)    Spoke with Homecare RN Benson Moser  [phone]  She mindy an INR on 7/4/17 and reported it to Rik ESCOBAR LVAD Coord.  He gave orders for 6mgs of warfarin on 7/4.    I asked the Home care nurse to draw another INR on 7/6. I called patient's daughter and left a message for her to have her father take 6mgs of warfarin on 7/5/17.    Estuardo Zepeda, [facility]

## 2017-07-06 ENCOUNTER — ANTICOAGULATION THERAPY VISIT (OUTPATIENT)
Dept: ANTICOAGULATION | Facility: CLINIC | Age: 67
End: 2017-07-06

## 2017-07-06 DIAGNOSIS — Z79.01 LONG-TERM (CURRENT) USE OF ANTICOAGULANTS: ICD-10-CM

## 2017-07-06 DIAGNOSIS — Z95.811 LVAD (LEFT VENTRICULAR ASSIST DEVICE) PRESENT (H): ICD-10-CM

## 2017-07-06 LAB
ANION GAP SERPL CALCULATED.3IONS-SCNC: 10 MMOL/L (ref 3–14)
BACTERIA SPEC CULT: NO GROWTH
BACTERIA SPEC CULT: NO GROWTH
BUN SERPL-MCNC: 20 MG/DL (ref 7–30)
CALCIUM SERPL-MCNC: 8.1 MG/DL (ref 8.5–10.1)
CHLORIDE SERPL-SCNC: 109 MMOL/L (ref 94–109)
CO2 SERPL-SCNC: 20 MMOL/L (ref 20–32)
CREAT SERPL-MCNC: 1.83 MG/DL (ref 0.66–1.25)
GFR SERPL CREATININE-BSD FRML MDRD: 37 ML/MIN/1.7M2
GLUCOSE SERPL-MCNC: 136 MG/DL (ref 70–99)
INR POINT OF CARE: 1.7 (ref 0.86–1.14)
MICRO REPORT STATUS: NORMAL
MICRO REPORT STATUS: NORMAL
POTASSIUM SERPL-SCNC: 3.9 MMOL/L (ref 3.4–5.3)
SODIUM SERPL-SCNC: 139 MMOL/L (ref 133–144)
SPECIMEN SOURCE: NORMAL
SPECIMEN SOURCE: NORMAL

## 2017-07-06 PROCEDURE — 85610 PROTHROMBIN TIME: CPT | Mod: QW

## 2017-07-06 PROCEDURE — 80048 BASIC METABOLIC PNL TOTAL CA: CPT

## 2017-07-06 NOTE — MR AVS SNAPSHOT
[facility]   7/6/2017   Anticoagulation Therapy Visit    Description:  67 year old male   Provider:  Amy Godinez, RN   Department:  [facility]           INR as of 7/6/2017     Today's INR 1.7!      Anticoagulation Summary as of 7/6/2017     INR goal    Prior goal 1.8-2.5   Today's INR 1.7!   Full instructions 7/6: 6 mg; 7/7: 4.5 mg; 7/8: 4.5 mg; 7/9: 6 mg   Next INR check 7/10/2017    Indications   LVAD (left ventricular assist device) present [Z95.811]  Long-term (current) use of anticoagulants [Z79.01] [Z79.01]         July 2017 Details    Sun Mon Tue Wed Thu Fri Sat           1                 2               3               4               5               6      6 mg   See details      7      4.5 mg         8      4.5 mg           9      6 mg         10            11               12               13               14               15                 16               17               18               19               20               21               22                 23               24               25               26               27               28               29                 30               31                     Date Details   07/06 This INR check       Date of next INR:  7/10/2017         How to take your warfarin dose     To take:  4.5 mg Take 4.5 of the 1 mg tablets.    To take:  6 mg Take 2 of the 3 mg tablets.

## 2017-07-06 NOTE — PROGRESS NOTES
ANTICOAGULATION FOLLOW-UP CLINIC VISIT    Patient Name:  Sohan Rendon  Date:  7/6/2017  Contact Type:  Telephone    SUBJECTIVE:     Patient Findings     Positives Antibiotic use or infection    Comments Bactrim continues from the hospital discharge - 8 more days of this   Pt is not on ASA           OBJECTIVE    INR Protime   Date Value Ref Range Status   07/06/2017 1.7 (A) 0.86 - 1.14 Final     Chromogenic Factor 10   Date Value Ref Range Status   08/10/2014 24 (L) 70 - 130 % Final     Comment:     Therapeutic Range:  A Chromogenic Factor 10 level of approximately 20-40%   inversely correlates with an INR of 2-3 for patients receiving Warfarin.   Chromogenic Factor 10 levels below 20% indicate an INR greater than 3 and   levels above 40% indicate an INR less than 2.       Factor 2 Assay   Date Value Ref Range Status   07/21/2014 25 (L) 60 - 140 % Final     Comment:     Analyte Specific Reagents (ASRs) are used in many laboratory tests necessary   for   standard medical care and generally do not require FDA approval.  This test   was   developed and its performance characteristics determined by [facility].  It has not been cleared or approved by   the [place] Food and Drug Administration.         ASSESSMENT / PLAN  INR assessment SUB    Recheck INR In: 4 DAYS    INR Location [facility] INR      Anticoagulation Summary as of 7/6/2017     INR goal    Prior goal 1.8-2.5   Today's INR 1.7!   Maintenance plan No maintenance plan   Full instructions 7/6: 6 mg; 7/7: 4.5 mg; 7/8: 4.5 mg; 7/9: 6 mg   Plan last modified Blanca Bah RN (5/30/2017)   Next INR check 7/10/2017   Priority INR   Target end date Indefinite    Indications   LVAD (left ventricular assist device) present [Z95.811]  Long-term (current) use of anticoagulants [Z79.01] [Z79.01]         Anticoagulation Episode Summary     INR check location     Preferred lab     Send INR reminders to [facility]     Comments Goal Range is 1.8-2.3  [facility] comes out to draw INR  Daughter Almaz Ph [phone]      Anticoagulation Care Providers     Provider Role Specialty Phone number    Evon Lemons MD Responsible Cardiology [phone]            See the Encounter Report to view Anticoagulation Flowsheet and Dosing Calendar (Go to Encounters tab in chart review, and find the Anticoagulation Therapy Visit)    Spoke with home care nurse Lyly.    Amy Godinez RN

## 2017-07-10 ENCOUNTER — ANTICOAGULATION THERAPY VISIT (OUTPATIENT)
Dept: ANTICOAGULATION | Facility: CLINIC | Age: 67
End: 2017-07-10

## 2017-07-10 DIAGNOSIS — Z79.01 LONG-TERM (CURRENT) USE OF ANTICOAGULANTS: ICD-10-CM

## 2017-07-10 DIAGNOSIS — Z95.811 LVAD (LEFT VENTRICULAR ASSIST DEVICE) PRESENT (H): ICD-10-CM

## 2017-07-10 PROBLEM — E16.2 HYPOGLYCEMIA: Status: ACTIVE | Noted: 2017-07-10

## 2017-07-10 LAB — INR POINT OF CARE: 2.5 (ref 0.86–1.14)

## 2017-07-10 NOTE — PROGRESS NOTES
ANTICOAGULATION FOLLOW-UP CLINIC VISIT    Patient Name:  Sohan Rendon  Date:  7/10/2017  Contact Type:  Telephone    SUBJECTIVE:     Patient Findings     Positives No Problem Findings           OBJECTIVE    INR Protime   Date Value Ref Range Status   07/10/2017 2.5 (A) 0.86 - 1.14 Final     Chromogenic Factor 10   Date Value Ref Range Status   08/10/2014 24 (L) 70 - 130 % Final     Comment:     Therapeutic Range:  A Chromogenic Factor 10 level of approximately 20-40%   inversely correlates with an INR of 2-3 for patients receiving Warfarin.   Chromogenic Factor 10 levels below 20% indicate an INR greater than 3 and   levels above 40% indicate an INR less than 2.       Factor 2 Assay   Date Value Ref Range Status   07/21/2014 25 (L) 60 - 140 % Final     Comment:     Analyte Specific Reagents (ASRs) are used in many laboratory tests necessary   for   standard medical care and generally do not require FDA approval.  This test   was   developed and its performance characteristics determined by [facility].  It has not been cleared or approved by   the US Food and Drug Administration.         ASSESSMENT / PLAN  INR assessment SUPRA    Recheck INR In: 4 DAYS    INR Location [facility]      Anticoagulation Summary as of 7/10/2017     INR goal    Prior goal 1.8-2.5   Today's INR 2.5   Maintenance plan No maintenance plan   Full instructions 7/10: 4.5 mg; 7/11: 4.5 mg; 7/12: 4.5 mg; 7/13: 4.5 mg   Plan last modified Blanca Bah RN (5/30/2017)   Next INR check 7/14/2017   Priority INR   Target end date Indefinite    Indications   LVAD (left ventricular assist device) present [Z95.811]  Long-term (current) use of anticoagulants [Z79.01] [Z79.01]         Anticoagulation Episode Summary     INR check location     Preferred lab     [facility]    Comments Goal Range is 1.8-2.3  [facility] comes out to draw INR  Daughter Almaz Ph [phone]  [phone]      Anticoagulation Care Providers     Provider Role Specialty Phone number    Evon Lemons MD Responsible Cardiology [phone]            See the Encounter Report to view Anticoagulation Flowsheet and Dosing Calendar (Go to Encounters tab in chart review, and find the Anticoagulation Therapy Visit)    Spoke with home care nurse Sofya.  Please call her on 7/14 with INR results as the INR on 7/14 will be done in clinic.  Sofya's phone: [phone].  Suspect pt will require two days/wk of 6 mg, all other days of the week 4.5 mg.    Amy Godinez RN

## 2017-07-10 NOTE — MR AVS SNAPSHOT
Sohan [facility]   7/10/2017   Anticoagulation Therapy Visit    Description:  67 year old male   Provider:  Amy Godinez, RN   [facility]           INR as of 7/10/2017     Today's INR 2.5      Anticoagulation Summary as of 7/10/2017     INR goal    Prior goal 1.8-2.5   Today's INR 2.5   Full instructions 7/10: 4.5 mg; 7/11: 4.5 mg; 7/12: 4.5 mg; 7/13: 4.5 mg   Next INR check 7/14/2017    Indications   LVAD (left ventricular assist device) present [Z95.811]  Long-term (current) use of anticoagulants [Z79.01] [Z79.01]         July 2017 Details    Sun Mon Tue Wed Thu Fri Sat           1                 2               3               4               5               6               7               8                 9               10      4.5 mg   See details      11      4.5 mg         12      4.5 mg         13      4.5 mg         14            15                 16               17               18               19               20               21               22                 23               24               25               26               27               28               29                 30               31                     Date Details   07/10 This INR check       Date of next INR:  7/14/2017         How to take your warfarin dose     To take:  4.5 mg Take 4.5 of the 1 mg tablets.

## 2017-07-12 ENCOUNTER — TELEPHONE (OUTPATIENT)
Dept: FAMILY MEDICINE | Facility: CLINIC | Age: 67
End: 2017-07-12

## 2017-07-12 NOTE — TELEPHONE ENCOUNTER
Patient daughter Almaz returning RN call regarding lab results. Almaz can be reached at [phone].      Thais Mosher MA

## 2017-07-13 DIAGNOSIS — I50.22 CHRONIC SYSTOLIC CONGESTIVE HEART FAILURE (H): Primary | ICD-10-CM

## 2017-07-14 NOTE — MR AVS SNAPSHOT
Sohan Justice   7/14/2017   Anticoagulation Therapy Visit    Description:  67 year old male   Provider:  Blanca Bah, RN   Department:  [facility]           INR as of 7/14/2017     Today's INR       Anticoagulation Summary as of 7/14/2017     INR goal    Prior goal 1.8-2.5   Today's INR    Full instructions 7/14: 3 mg; 7/15: 3 mg; 7/16: 4.5 mg   Next INR check 7/17/2017    Indications   LVAD (left ventricular assist device) present [Z95.811]  Long-term (current) use of anticoagulants [Z79.01] [Z79.01]         July 2017 Details    Sun Mon Tue Wed Thu Fri Sat           1                 2               3               4               5               6               7               8                 9               10               11               12               13               14      3 mg   See details      15      3 mg           16      4.5 mg         17            18               19               20               21               22                 23               24               25               26               27               28               29                 30               31                     Date Details   07/14 This INR check       Date of next INR:  7/17/2017         How to take your warfarin dose     To take:  3 mg Take 1 of the 3 mg tablets.    To take:  4.5 mg Take 1.5 of the 3 mg tablets.

## 2017-07-14 NOTE — LETTER
7/14/2017      RE: Sohan Rendon  [address]   [place] [facility] [address]       Dear Colleague,    Thank you for the opportunity to participate in the care of your patient, Sohan Rendon, at the [facility]. Please see a copy of my visit note below.    HPI: Sohan is a 67 year old male with ischemic CM s/p HM II LVAD as DT complicated by pump thrombosis, recurrent hemolysis, and multiple CVA's, CKD, CDiff, HTN, hyperlipidemia, and latent TB who presents to [facility] today for a follow up visit for a recent hospitalization at [facility] from 6/29/17 to 7/2/17 for UTI sepsis with non lactose fermenting gram negative rods.    Since his hospitalization, he states via professional  that he is doing ok.  His daughter's main concern today is that he isn't urinating as much.  She is worried that he might have another urinary tract infection or may need some Lasix to help him urinate better.   Patient denies SOB, PND, CP, palpitations, lightheadedness, dizziness, edema. Orthopnea is unchanged.  Daughter states he is typically in bed.  She helps take turns taking care of him with her other sibling and turns him frequently.      Driveline site has not changed per her report.  Denies fever, but notes occasionally he gets chilled and has dull headaches that are relieved with Tylenol.  He has had one episode of nausea, with no vomiting.  Denies signs of infection, neurological changes, hematuria, hematochezia, melena, or epistaxis.       PAST MEDICAL HISTORY:  Past Medical History:   Diagnosis Date     Acute right MCA stroke (H) 6/2013    With L sided hemiparesis      Anemia of chronic disease     Baseline Hb 8-9     BPH (benign prostatic hyperplasia)      CHF (congestive heart failure), NYHA class IV (H) 10/9/2012    s/p HeartMate II.  Was on milrinone and dobutamine prior to LVAD      CKD (chronic kidney disease)     Baseline Cr=     Clostridium  difficile colitis 12/2012      Dysphagia     PEG tube placed in 2012.  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  Normal carotid duplex 3/2014.       Esophagitis 12/2012    EGD with esophagitis and multiple douenal ulcers     Fracture of femoral neck, right, closed (H) 2/3/2015    Presumed pathologic as patient is non-weight-bearing and suffered no trauma.  Family declined operative repair during hospital stay 1/23 - 1/30/15.     Gastric and duodenal angiodysplasia with hemorrhage 6/18/2015     GERD (gastroesophageal reflux disease)      Gout      HTN (hypertension)     LDL=59 (3/29/2014)     Hyperlipaemia      Ischemic cardiomyopathy      Mitral regurgitation     s/p MVR with Carbomedics ring      Myocardial infarction (H) 1998    In [facility] without intervention      Nonsenile cataract     BE     PFO (patent foramen ovale)     s/p closure (10/9/2012)     TB lung, latent     s/p 9 months INH in 2012        FAMILY HISTORY:  Family History   Problem Relation Age of Onset     Family History Negative No family hx of      Glaucoma No family hx of      Macular Degeneration No family hx of      CANCER No family hx of      no skin cancer       SOCIAL HISTORY:  Social History     Social History     Marital status:      Spouse name: N/A     Number of children: N/A     Years of education: N/A     Social History Main Topics     Smoking status: Former Smoker     Smokeless tobacco: Former User     Alcohol use No     Drug use: No     Sexual activity: Not Asked     Other Topics Concern     None     Social History Narrative       CURRENT MEDICATIONS:  Current Outpatient Prescriptions   Medication Sig Dispense Refill     metoprolol (LOPRESSOR) 25 MG tablet Take 50 mg (2 tablets) in the morning and 25 mg (1 tablet) in the evening. 60 tablet      furosemide (LASIX) 20 MG tablet Take 1 tablet (20 mg) by mouth as needed 20 tablet 11      ALEK CONTOUR test strip USE TO TEST BLOOD SUGAR 2 TIMES DAILY OR AS DIRECTED. 100 strip 1     warfarin (COUMADIN) 3 MG tablet Take 3mgtonight and tomorrow night. Follow up with coumadin clinic on Tuesday for further dosing 180 tablet 3     ketotifen (ZADITOR/REFRESH ANTI-ITCH) 0.025 % SOLN ophthalmic solution Place 1 drop into both eyes 2 times daily 1 Bottle 11     ASPIRIN NOT PRESCRIBED (INTENTIONAL) Please choose reason not prescribed, below 0 each 0     SENEXON-S 8.6-50 MG per tablet TAKE 1-2 TABLETS BY MOUTH 2 TIMES DAILY AS NEEDED FOR CONSTIPATION 60 tablet 3     metoprolol (TOPROL-XL) 25 MG 24 hr tablet TAKE 1 TABLET (25 MG) BY MOUTH EVERY EVENING 90 tablet 3     bisacodyl (DULCOLAX) 10 MG Suppository Place 1 suppository (10 mg) rectally daily as needed for constipation 5 suppository 1     magnesium oxide (MAG-OX) 400 MG tablet Take 1 tablet (400 mg) by mouth 2 times daily 14 tablet 0     finasteride (PROSCAR) 5 MG tablet Take 1 tablet (5 mg) by mouth daily 90 tablet 3     atorvastatin (LIPITOR) 10 MG tablet TAKE 1 TABLET (10 MG) BY MOUTH DAILY 90 tablet 3     calcium carbonate-vitamin D 500-400 MG-UNIT TABS per tablet Take 1 tablet by mouth daily 90 tablet 3     simethicone (MYLICON) 80 MG chewable tablet Take 1 tablet (80 mg) by mouth 4 times daily 180 tablet 5     oxyCODONE (ROXICODONE) 5 MG IR tablet Take 1 tablet (5 mg) by mouth every 4 hours as needed for moderate to severe pain 30 tablet 0     melatonin 3 MG tablet Take 1 tablet (3 mg) by mouth At Bedtime       Thiamine HCl (VITAMIN B-1) 100 MG TABS TAKE 1 TABLET (100 MG) BY MOUTH DAILY 90 tablet 3     order for DME Equipment being ordered: Cushioned heel boots. 2 each 0     losartan (COZAAR) 25 MG tablet Take 1 tablet (25 mg) by mouth daily 45 tablet 3     allopurinol (ZYLOPRIM) 100 MG tablet Take 1 tablet (100 mg) by mouth daily 90 tablet 3     order for DME Equipment being ordered: Wheelchair, manual, with elevated leg rests and tilt in space back.   Please fax to [facility] [place]; I called them 11/26/16 and they requested the new order.  Face to face is in my 10/26/16 note. 1 each 0     pantoprazole (PROTONIX) 40 MG enteric coated tablet Take 1 tablet (40 mg) by mouth daily 180 tablet 3     tamsulosin (FLOMAX) 0.4 MG 24 hr capsule Take 1 capsule (0.4 mg) by mouth daily 90 capsule 3     order for DME Equipment being ordered: Wheelchair, manual. 1 each 0     order for DME Equipment being ordered: Hospital Bed, fully electric. 1 each 0     loratadine (CLARITIN) 10 MG tablet Take 1 tablet (10 mg) by mouth daily 90 tablet 3     levETIRAcetam (KEPPRA) 750 MG tablet Take 1 tablet (750 mg) by mouth 2 times daily 180 tablet 3     order for DME Equipment being ordered: Reclining manual w/c with bilateral elevating leg rests. 1 each 0     nystatin (MYCOSTATIN) 837567 UNIT/GM POWD Apply to affected areas of skin in the groin and on the scrotum three times a day as needed. 60 g 3     order for DME Equipment being ordered: Bilateral leg supports for manual wheelchair. 2 each 0     nitroglycerin (NITROSTAT) 0.4 MG SL tablet Place 1 tablet (0.4 mg) under the tongue every 5 minutes as needed for chest pain 30 tablet 1     blood glucose monitoring (NO BRAND SPECIFIED) lancets Use to test blood sugars 2 times daily or as directed. 1 Box 3     ORDER FOR DME Equipment being ordered: Lift chair.    Please see indications and face-to-face encounter details in 2/3/15 Office Visit note. 1 Device 0     acetaminophen (TYLENOL) 325 MG tablet Take 2 tablets (650 mg) by mouth every 6 hours (Patient taking differently: Take 650 mg by mouth as needed ) 100 tablet 0     bisacodyl (DULCOLAX) 5 MG EC tablet Take 1 tablet (5 mg) by mouth daily as needed for constipation 20 tablet 1       ROS:   Constitutional: Occasional chills.  No fever or diaphoresis. No weight gain/loss.   ENT: No visual disturbance, ear ache, epistaxis, sore throat.   Allergies/Immunologic: Negative.   Respiratory: No cough,  "hemoptysis.   Cardiovascular: As per HPI.   GI: One episode of nausea.  No vomiting, hematemesis, melena, or hematochezia.   : Urinating less.  Denies urinary frequency unless taking lasix,, dysuria, or hematuria.   Integument: Negative.   Psychiatric: Negative.   Neuro: Negative.    Endocrinology: Negative.   Musculoskeletal: Overall weakness.  Neck pain.    EXAM:  BP (!) 78/0 (BP Location: Right arm, Patient Position: Chair, Cuff Size: Adult Regular)  Pulse 60  Temp 97.7  F (36.5  C)  Ht 1.727 m (5' 8\")  Wt 81.6 kg (180 lb)  SpO2 91%  BMI 27.37 kg/m2  General: appears comfortable, alert and articulate, lying on stretcher.  Head: normocephalic, atraumatic  Eyes: anicteric sclera, EOMI  Neck: no adenopathy, neck supple. No thyromegaly or carotid bruits.   Orophyarynx: moist mucosa, no lesions, poor dentition.  Heart: regular, S1/S2, no murmur, gallop, rub, estimated JVP approximately 14 cm.  Lungs: clear, but decreased bilaterally.  No rales or wheezing  Abdomen: soft, non-tender, bowel sounds present, no hepatomegaly  Extremities: Trace edema.  No clubbing, cyanosis.  Neurological: normal speech and affect, no gross motor deficits    Labs:  CBC RESULTS:  Lab Results   Component Value Date    WBC 7.0 07/14/2017    RBC 3.39 (L) 07/14/2017    HGB 9.8 (L) 07/14/2017    HCT 32.4 (L) 07/14/2017    MCV 96 07/14/2017    MCH 28.9 07/14/2017    MCHC 30.2 (L) 07/14/2017    RDW 19.0 (H) 07/14/2017     07/14/2017       CMP RESULTS:  Lab Results   Component Value Date     07/14/2017    POTASSIUM 4.4 07/14/2017    CHLORIDE 111 (H) 07/14/2017    CO2 19 (L) 07/14/2017    ANIONGAP 8 07/14/2017     (H) 07/14/2017    BUN 27 07/14/2017    CR 2.18 (H) 07/14/2017    GFRESTIMATED 30 (L) 07/14/2017    GFRESTBLACK 37 (L) 07/14/2017    JOSÉ 8.3 (L) 07/14/2017    BILITOTAL 0.5 07/14/2017    ALBUMIN 3.0 (L) 07/14/2017    ALKPHOS 116 07/14/2017    ALT 24 07/14/2017    AST 37 07/14/2017        INR RESULTS:  Lab Results "   Component Value Date    INR 3.14 (H) 2017       No components found for: CK  Lab Results   Component Value Date    MAG 1.8 2017     Lab Results   Component Value Date    NTBNP 1687 (H) 2015       Most recent echocardiogram: 4/10/16  Interpretation Summary  Suboptimal study to rule out endocarditis. Technically difficult  Study.  Extremely poor acoustic windows.  ______________________________________________________________________________    Left Ventricle  Global Left ventricular function cannot be assessed.     Right Ventricle  The right ventricle cannot be assessed. A pacemaker lead is noted in the right ventricle.  Atria  The atria cannot be assessed.     Mitral Valve  The mitral valve is normal. Mild mitral annular calcification is present.     Aortic Valve  The aortic valve cannot be assessed.     Tricuspid Valve  The tricuspid valve cannot be assessed. Trace to mild tricuspid insufficiency is present. The right ventricular systolic pressure is approximated at 17.0 mmHg plus the right atrial pressure.     Pulmonic Valve  The pulmonic valve cannot be assessed.     Vessels  The aorta root cannot be assessed. The inferior vena cava cannot be assessed.  Pericardium Trivial pericardial effusion is present.     ______________________________________________________________________________     Doppler Measurements & Calculations  TR max trang: 206.0 cm/sec  TR max P.0 mmHg       Assessment and Plan:    Sohan is a 67 year old male with ICM s/p HM II LVAD who appears to be hypervolemic today on exam.  He will take Furosemide 20 mg daily x 3 days and then will take it as needed per prior recommendations.  His daughter is giving him a higher dose of Toprol XL 50 mg in the am and 25 mg in the pm.  He was reduced to 25 mg daily secondary to sepsis in the hospital. He will continue Toprol XL 50 mg in the am and 25 mg in the pm. He will follow up in [facility] in two weeks with a repeat  echocardiogram prior.      1.  Chronic systolic heart failure secondary to ICM.  Stage D  NYHA Class IIIB  ACEi/ARB: yes, Losartan 25 mg daily.   BB: yes, Toprol XL 50 mg in the am, 25 mg in pm  Aldosterone antagonist: deferred while other medical therapy is prioritized  SCD prophylaxis: ICD  Fluid status: hypervolemic  S/P LVAD implant as DT   Anticoagulation: Warfarin. INR 3.14 today, dosing per Anticoagulation clinic.  INR goal of 1.8-2.5  Antiplatelet:  Deferred per Dr. Lemons's recommendations in previous record review secondary to prior history of gross hematuria.       2.  S/p HM II LVAD implant as DT:  Anticoagulation: Warfarin. INR 3.14 today.  Suspect secondary to liver congestion.  Dosage adjusted per Anticoagulation clinic.  INR goal of 1.8-2.5 secondary to history of gross hematuria.  Antiplatelet:  Deferred per Dr. Lemons's recommendations in previous record review secondary to prior history of gross hematuria.   MAP:  78 today.   VAD Interrogation today: VAD interrogation reviewed with VAD coordinator. Agree with findings. Frequent PI events, without power spikes, speed drops, or other findings suspicious of pump malfunction noted.     3.  CKD:  Creatinine today is 2.18.  Suspect cardiorenal syndrome.  Will  monitor closely to see if diuresing improves kidney function.      4.  Hyperlipidemia: Controlled on  Atorvastatin 10 mg daily.  Last FLP 6/17.     5. Chronic UTI:  Primary MD is scheduled to see him for a home visit early next week.  Daughter will follow up with his primary MD with questions regarding long term antibiotic prophylaxis.      6.  Follow-up in [facility] in two weeks with echocardiogram prior.   BMP in one week.        Vicky RANGEL, CNP  [phone]

## 2017-07-14 NOTE — PROGRESS NOTES
ANTICOAGULATION FOLLOW-UP CLINIC VISIT    Patient Name:  Sohan Rendon  Date:  7/14/2017  Contact Type:  Telephone    SUBJECTIVE:     Patient Findings     Positives Unexplained INR or factor level change           OBJECTIVE    INR   Date Value Ref Range Status   07/14/2017 3.14 (H) 0.86 - 1.14 Final     Chromogenic Factor 10   Date Value Ref Range Status   08/10/2014 24 (L) 70 - 130 % Final     Comment:     Therapeutic Range:  A Chromogenic Factor 10 level of approximately 20-40%   inversely correlates with an INR of 2-3 for patients receiving Warfarin.   Chromogenic Factor 10 levels below 20% indicate an INR greater than 3 and   levels above 40% indicate an INR less than 2.       Factor 2 Assay   Date Value Ref Range Status   07/21/2014 25 (L) 60 - 140 % Final     Comment:     Analyte Specific Reagents (ASRs) are used in many laboratory tests necessary   for   standard medical care and generally do not require FDA approval.  This test   was   developed and its performance characteristics determined by [facility].  It has not been cleared or approved by   the [place] Food and Drug Administration.         ASSESSMENT / PLAN  INR assessment SUPRA    Recheck INR In: 3 DAYS    INR Location Clinic      Anticoagulation Summary as of 7/14/2017     INR goal    Prior goal 1.8-2.5   Today's INR    Maintenance plan No maintenance plan   Full instructions 7/14: 3 mg; 7/15: 3 mg; 7/16: 4.5 mg   Plan last modified Blanca Bah RN (5/30/2017)   Next INR check 7/17/2017   Priority INR   Target end date Indefinite    Indications   LVAD (left ventricular assist device) present [Z95.811]  Long-term (current) use of anticoagulants [Z79.01] [Z79.01]         Anticoagulation Episode Summary     INR check location     Preferred lab     Send INR reminders to [facility]    Comments Goal Range is 1.8-2.3  [facility] comes out to draw INR  Daughter Almaz Ph [phone]       Anticoagulation Care Providers     Provider Role Specialty Phone number    Evon Lemons MD Responsible Cardiology [phone]            See the Encounter Report to view Anticoagulation Flowsheet and Dosing Calendar (Go to Encounters tab in chart review, and find the Anticoagulation Therapy Visit)    Spoke with Almaz.    Blanca Bah RN

## 2017-07-14 NOTE — NURSING NOTE
Chief Complaint   Patient presents with     Follow Up For     VAD Follow up      Vitals were taken and medications were reconciled.   YESENIA Fontana  11:04 AM

## 2017-07-14 NOTE — MR AVS SNAPSHOT
After Visit Summary   7/14/2017    Sohan Rendon    MRN: [id number]           Patient Information     Date Of Birth          1950        Visit Information        Provider Department      7/14/2017 10:45 AM Vicky Ziegler NP; [employer] [facility]        Today's Diagnoses     Chronic systolic congestive heart failure (H)    -  1      Care Instructions    Medication changes:     Please Lasix 20 mg one time daily for 3 days. Then go back to taking it as needed.     Follow up:     Please see Vicky Ziegler in 2 weeks.     Please get a BMP lab drawn next week.           Follow-ups after your visit        Follow-up notes from your care team     Return in about 2 weeks (around 7/27/2017) for LVAD follow up .      Your next 10 appointments already scheduled     Jul 18, 2017  1:45 PM CDT   Return Visit with Thomas Dill MD   [facility] ([facility])    [address]  [address]  [facility] [address]   [phone]            Aug 09, 2017  9:30 AM CDT   Lab with  LAB   [facility] ([facility])    [address]  1st Floor  [facility] [address]   [phone]            Aug 09, 2017 10:00 AM CDT   (Arrive by 9:45 AM)   Ventricular Assist Device with Vicky Ziegler NP   [facility] ([facility])    [address]  3rd Floor  [facility] [address]   [phone]            Aug 15, 2017  2:00 PM CDT   Return Visit with Thomas Dill MD   [facility] ([facility])    [address]  [address]  [facility] [address]   [phone]            Sep 12, 2017  2:00 PM CDT   Return Visit with Thomas Dill MD   [facility] ([facility]    [address]  [address]  [facility] [address]   [phone]            Oct 10, 2017  2:00 PM CDT   Return Visit with Thomas  "Tera Dill MD   [facility] ([facility])    [address]  [address]  [facility] [address] [phone]              Who to contact     If you have questions or need follow up information about today's clinic visit or your schedule please contact [facility] directly at [phone].  Normal or non-critical lab and imaging results will be communicated to you by MyChart, letter or phone within 4 business days after the clinic has received the results. If you do not hear from us within 7 days, please contact the clinic through [employer] or phone. If you have a critical or abnormal lab result, we will notify you by phone as soon as possible.  Submit refill requests through [employer] or call your pharmacy and they will forward the refill request to us. Please allow 3 business days for your refill to be completed.          Additional Information About Your Visit        [employer] Information     [employer] lets you send messages to your doctor, view your test results, renew your prescriptions, schedule appointments and more. To sign up, go to [email or web address] . Click on \"Log in\" on the left side of the screen, which will take you to the Welcome page. Then click on \"Sign up Now\" on the right side of the page.     You will be asked to enter the access code listed below, as well as some personal information. Please follow the directions to create your username and password.     Your access code is: [email or web address]  Expires: 2017  8:55 AM     Your access code will  in 90 days. If you need help or a new code, please call your [facility] clinic or [phone].        Care EveryWhere ID     This is your Care EveryWhere ID. This could be used by other organizations to access your [facility] medical records  [id number]        Your Vitals Were     Pulse Temperature Height Pulse Oximetry BMI (Body Mass Index)       60 97.7  F (36.5  C) 1.727 m (5' 8\") 91% 27.37 kg/m2     "    Blood Pressure from Last 3 Encounters:   07/14/17 (!) 78/0   07/02/17 120/89   06/29/17 102/80    Weight from Last 3 Encounters:   07/14/17 81.6 kg (180 lb)   07/01/17 80.4 kg (177 lb 4.8 oz)   05/15/17 86.3 kg (190 lb 4.1 oz)              Today, you had the following     No orders found for display         Today's Medication Changes          These changes are accurate as of: 7/14/17 12:20 PM.  If you have any questions, ask your nurse or doctor.               Start taking these medicines.        Dose/Directions    furosemide 20 MG tablet   Commonly known as:  LASIX   Used for:  Chronic systolic congestive heart failure (H)   Started by:  Vicky Ziegler NP        Dose:  20 mg   Take 1 tablet (20 mg) by mouth as needed   Quantity:  20 tablet   Refills:  11         These medicines have changed or have updated prescriptions.        Dose/Directions    acetaminophen 325 MG tablet   Commonly known as:  TYLENOL   This may have changed:    - when to take this  - reasons to take this   Used for:  Femoral neck fracture, right, closed, initial encounter        Dose:  650 mg   Take 2 tablets (650 mg) by mouth every 6 hours   Quantity:  100 tablet   Refills:  0            Where to get your medicines      These medications were sent to [facility]/pharmacy #[id number] [facility] [address], [place] [address]     Phone:  [phone]     furosemide 20 MG tablet                Primary Care Provider Office Phone # Fax #    Thomas Dill [phone]        [facility] [address] [address]        Equal Access to Services     [facility] AH: Hadii emily Rahman, waaxda luqadaha, qaybta kaalmawillie singh. So [facility] [phone].    ATENCIÓN: Si habla español, tiene a smith disposición servicios gratuitos de asistencia lingüística. Llame al [phone].    We comply with applicable federal  civil rights laws and [place] laws. We do not discriminate on the basis of race, color, national origin, age, disability sex, sexual orientation or gender identity.            Thank you!     Thank you for choosing [facility]  for your care. Our goal is always to provide you with excellent care. Hearing back from our patients is one way we can continue to improve our services. Please take a few minutes to complete the written survey that you may receive in the mail after your visit with us. Thank you!             Your Updated Medication List - Protect others around you: Learn how to safely use, store and throw away your medicines at www.disposemymeds.org.          This list is accurate as of: 7/14/17 12:20 PM.  Always use your most recent med list.                   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    Hypoglycemia       * bisacodyl 5 MG EC tablet    DULCOLAX    20 tablet    Take 1 tablet (5 mg) by mouth daily as needed for constipation    Constipation       * bisacodyl 10 MG Suppository    DULCOLAX    5 suppository    Place 1 suppository (10 mg) rectally daily as needed for constipation    Drug-induced constipation       blood glucose monitoring lancets      1 Box    Use to test blood sugars 2 times daily or as directed.    Diabetes mellitus, type 2 (H)       calcium carbonate-vitamin D 500-400 MG-UNIT Tabs per tablet     90 tablet    Take 1 tablet by mouth daily    Cardiac arrhythmia, unspecified cardiac arrhythmia type       finasteride 5 MG tablet    PROSCAR    90 tablet    Take 1 tablet (5 mg) by mouth daily    Incomplete bladder emptying       furosemide 20 MG tablet    LASIX    20 tablet    Take 1 tablet (20 mg) by mouth as needed    Chronic systolic congestive heart failure (H)       ketotifen 0.025 % Soln ophthalmic solution    ZADITOR/REFRESH ANTI-ITCH    1 Bottle    Place 1 drop into both eyes 2 times daily    Allergic conjunctivitis, bilateral       levETIRAcetam 750 MG tablet    KEPPRA    180 tablet    Take 1 tablet (750 mg) by mouth 2 times daily    Convulsions, unspecified convulsion type (H)       loratadine 10 MG tablet    CLARITIN    90 tablet    Take 1 tablet (10 mg) by mouth daily    Allergic rhinitis, unspecified allergic rhinitis type       losartan 25 MG tablet    COZAAR    45 tablet    Take 1 tablet (25 mg) by mouth daily    CHF (congestive heart failure), NYHA class IV, chronic, systolic (H)       magnesium oxide 400 MG tablet    MAG-OX    14 tablet    Take 1 tablet (400 mg) by mouth 2 times daily    Abdominal distention, Drug-induced constipation       melatonin 3 MG tablet      Take 1 tablet (3 mg) by mouth At Bedtime    Insomnia, unspecified type       metoprolol 25 MG 24 hr tablet    TOPROL-XL    90 tablet    TAKE 1 TABLET (25 MG) BY MOUTH EVERY EVENING    LVAD (left ventricular assist device) present (H)       metoprolol 25 MG tablet    LOPRESSOR    60 tablet    Take 50 mg (2 tablets) in the morning and 25 mg (1 tablet) in the evening.    Chronic systolic congestive heart failure (H)       nitroGLYcerin 0.4 MG sublingual tablet    NITROSTAT    30 tablet    Place 1 tablet (0.4 mg) under the tongue every 5 minutes as needed for chest pain     Coronary artery disease involving native coronary artery with unstable angina pectoris (H)       nystatin 176559 UNIT/GM Powd    MYCOSTATIN    60 g    Apply to affected areas of skin in the groin and on the scrotum three times a day as needed.        * order for DME     1 Device    Equipment being ordered: Lift chair.  Please see indications and face-to-face encounter details in 2/3/15 Office Visit note.    Fracture of femoral neck, right, closed, initial encounter, Stroke (H), CHF (congestive heart failure), NYHA class IV (H)       * order for DME     2 each    Equipment being ordered: Bilateral leg supports for manual wheelchair.    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  Please fax to [facility]; I called them 11/26/16 and they requested the new order.  Face to face is in my 10/26/16 note.    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, subsequent encounter       pantoprazole 40 MG EC tablet    PROTONIX    180 tablet    Take 1 tablet (40 mg) by mouth daily    Gastrointestinal hemorrhage associated with chronic superficial gastritis       SENEXON-S 8.6-50 MG per tablet   Generic drug:  senna-docusate     60 tablet    TAKE 1-2 TABLETS BY MOUTH 2 TIMES DAILY AS NEEDED FOR CONSTIPATION    Slow transit constipation       simethicone 80 MG chewable tablet    MYLICON    180 tablet    Take 1 tablet (80 mg) by mouth 4 times daily    Slow transit constipation       tamsulosin 0.4 MG capsule    FLOMAX    90 capsule    Take 1 capsule (0.4 mg) by mouth daily    Incomplete bladder emptying       thiamine 100 MG tablet     90 tablet    TAKE 1 TABLET (100 MG) BY MOUTH DAILY    Cerebrovascular accident (CVA) due to embolism of right middle cerebral artery (H)       warfarin 3 MG tablet    COUMADIN    180 tablet    Take 3mgtonight and tomorrow night. Follow up with coumadin clinic on Tuesday for further dosing    Cerebrovascular accident (CVA) due to embolism of right middle cerebral artery (H)       * Notice:  This list has 9 medication(s) that are the same as other medications prescribed for you. Read the directions carefully, and ask your doctor or other care provider to review them with you.

## 2017-07-14 NOTE — NURSING NOTE
1). PUMP DATA  Primary controller serial number: [id number]    HM II:   Flow: 3.6 L/min,    Speed: 8800 RPMs,     PI: 3.2 ,  Power: 5.0 [place],      Primary controller   Back up battery: Patient use: NA, Replace in: NA  Months     Data downloaded: No   Equipment and driveline assessed for damage: Yes     Back up : Serial number: [id number]  Back up battery: Patient use: NA Replace in: NA  Months  Programmed settings identical to the settings on the primary controller :Yes      Education complete: Yes   Charge the BACKUP controller s backup battery every 6 months  Perform a self test on BACKUP every 6 months  Change the MPU s batteries every 6 months:NA  Have equipment serviced yearly (if applicable):Yes      2). ALARMS  Alarms reported by patient since last pump evaluation: No  Alarms or other finding noted during pump data history and alarm download: Yes, frequent PI events PIs WNL    Action Taken:  Reviewed data with patient: Yes      3). DRESSING CHANGE / DRIVELINE ASSESSMENT  Dressing change completed today: Yes  Appearance of Driveline site: CDI, no redness no drainage and no tenderness    Driveline stabilization: Method: [employer]  [ Teaching reinforced on need for stabilization of Driveline. ]

## 2017-07-14 NOTE — PATIENT INSTRUCTIONS
Medication changes:     Please Lasix 20 mg one time daily for 3 days. Then go back to taking it as needed.     Follow up:     Please see Vicky Ziegler in 2 weeks.     Please get a BMP lab drawn next week.

## 2017-07-15 NOTE — PROGRESS NOTES
HPI: Sohan is a 67 year old male with ischemic CM s/p HM II LVAD as DT complicated by pump thrombosis, recurrent hemolysis, and multiple CVA's, CKD, CDiff, HTN, hyperlipidemia, and latent TB who presents to [facility] today for a follow up visit for a recent hospitalization at [facility] from 6/29/17 to 7/2/17 for UTI sepsis with non lactose fermenting gram negative rods.    Since his hospitalization, he states via professional  that he is doing ok.  His daughter's main concern today is that he isn't urinating as much.  She is worried that he might have another urinary tract infection or may need some Lasix to help him urinate better.   Patient denies SOB, PND, CP, palpitations, lightheadedness, dizziness, edema. Orthopnea is unchanged.  Daughter states he is typically in bed.  She helps take turns taking care of him with her other sibling and turns him frequently.      Driveline site has not changed per her report.  Denies fever, but notes occasionally he gets chilled and has dull headaches that are relieved with Tylenol.  He has had one episode of nausea, with no vomiting.  Denies signs of infection, neurological changes, hematuria, hematochezia, melena, or epistaxis.       PAST MEDICAL HISTORY:  Past Medical History:   Diagnosis Date     Acute right MCA stroke (H) 6/2013    With L sided hemiparesis      Anemia of chronic disease     Baseline Hb 8-9     BPH (benign prostatic hyperplasia)      CHF (congestive heart failure), NYHA class IV (H) 10/9/2012    s/p HeartMate II.  Was on milrinone and dobutamine prior to LVAD      CKD (chronic kidney disease)     Baseline Cr=     Clostridium difficile colitis 12/2012      Dysphagia     PEG tube placed in 2012.  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  Normal carotid duplex 3/2014.       Esophagitis 12/2012    EGD with esophagitis and multiple douenal ulcers      Fracture of femoral neck, right, closed (H) 2/3/2015    Presumed pathologic as patient is non-weight-bearing and suffered no trauma.  Family declined operative repair during hospital stay 1/23 - 1/30/15.     Gastric and duodenal angiodysplasia with hemorrhage 6/18/2015     GERD (gastroesophageal reflux disease)      Gout      HTN (hypertension)     LDL=59 (3/29/2014)     Hyperlipaemia      Ischemic cardiomyopathy      Mitral regurgitation     s/p MVR with Carbomedics ring      Myocardial infarction (H) 1998    In [facility] without intervention      Nonsenile cataract     BE     PFO (patent foramen ovale)     s/p closure (10/9/2012)     TB lung, latent     s/p 9 months INH in 2012        FAMILY HISTORY:  Family History   Problem Relation Age of Onset     Family History Negative No family hx of      Glaucoma No family hx of      Macular Degeneration No family hx of      CANCER No family hx of      no skin cancer       SOCIAL HISTORY:  Social History     Social History     Marital status:      Spouse name: N/A     Number of children: N/A     Years of education: N/A     Social History Main Topics     Smoking status: Former Smoker     Smokeless tobacco: Former User     Alcohol use No     Drug use: No     Sexual activity: Not Asked     Other Topics Concern     None     Social History Narrative       CURRENT MEDICATIONS:  Current Outpatient Prescriptions   Medication Sig Dispense Refill     metoprolol (LOPRESSOR) 25 MG tablet Take 50 mg (2 tablets) in the morning and 25 mg (1 tablet) in the evening. 60 tablet      furosemide (LASIX) 20 MG tablet Take 1 tablet (20 mg) by mouth as needed 20 tablet 11     ALEK CONTOUR test strip USE TO TEST BLOOD SUGAR 2 TIMES DAILY OR AS DIRECTED. 100 strip 1     warfarin (COUMADIN) 3 MG tablet Take 3mgtonight and tomorrow night. Follow up with coumadin clinic on Tuesday for further dosing 180 tablet 3     ketotifen (ZADITOR/REFRESH ANTI-ITCH) 0.025 % SOLN ophthalmic solution Place 1  drop into both eyes 2 times daily 1 Bottle 11     ASPIRIN NOT PRESCRIBED (INTENTIONAL) Please choose reason not prescribed, below 0 each 0     SENEXON-S 8.6-50 MG per tablet TAKE 1-2 TABLETS BY MOUTH 2 TIMES DAILY AS NEEDED FOR CONSTIPATION 60 tablet 3     metoprolol (TOPROL-XL) 25 MG 24 hr tablet TAKE 1 TABLET (25 MG) BY MOUTH EVERY EVENING 90 tablet 3     bisacodyl (DULCOLAX) 10 MG Suppository Place 1 suppository (10 mg) rectally daily as needed for constipation 5 suppository 1     magnesium oxide (MAG-OX) 400 MG tablet Take 1 tablet (400 mg) by mouth 2 times daily 14 tablet 0     finasteride (PROSCAR) 5 MG tablet Take 1 tablet (5 mg) by mouth daily 90 tablet 3     atorvastatin (LIPITOR) 10 MG tablet TAKE 1 TABLET (10 MG) BY MOUTH DAILY 90 tablet 3     calcium carbonate-vitamin D 500-400 MG-UNIT TABS per tablet Take 1 tablet by mouth daily 90 tablet 3     simethicone (MYLICON) 80 MG chewable tablet Take 1 tablet (80 mg) by mouth 4 times daily 180 tablet 5     oxyCODONE (ROXICODONE) 5 MG IR tablet Take 1 tablet (5 mg) by mouth every 4 hours as needed for moderate to severe pain 30 tablet 0     melatonin 3 MG tablet Take 1 tablet (3 mg) by mouth At Bedtime       Thiamine HCl (VITAMIN B-1) 100 MG TABS TAKE 1 TABLET (100 MG) BY MOUTH DAILY 90 tablet 3     order for DME Equipment being ordered: Cushioned heel boots. 2 each 0     losartan (COZAAR) 25 MG tablet Take 1 tablet (25 mg) by mouth daily 45 tablet 3     allopurinol (ZYLOPRIM) 100 MG tablet Take 1 tablet (100 mg) by mouth daily 90 tablet 3     order for DME Equipment being ordered: Wheelchair, manual, with elevated leg rests and tilt in space back.  Please fax to [facility]; I called them 11/26/16 and they requested the new order.  Face to face is in my 10/26/16 note. 1 each 0     pantoprazole (PROTONIX) 40 MG enteric coated tablet Take 1 tablet (40 mg) by mouth daily 180 tablet 3     tamsulosin (FLOMAX) 0.4 MG 24 hr capsule Take 1 capsule (0.4 mg) by mouth daily  90 capsule 3     order for DME Equipment being ordered: Wheelchair, manual. 1 each 0     order for DME Equipment being ordered: Hospital Bed, fully electric. 1 each 0     loratadine (CLARITIN) 10 MG tablet Take 1 tablet (10 mg) by mouth daily 90 tablet 3     levETIRAcetam (KEPPRA) 750 MG tablet Take 1 tablet (750 mg) by mouth 2 times daily 180 tablet 3     order for DME Equipment being ordered: Reclining manual w/c with bilateral elevating leg rests. 1 each 0     nystatin (MYCOSTATIN) 668459 UNIT/GM POWD Apply to affected areas of skin in the groin and on the scrotum three times a day as needed. 60 g 3     order for DME Equipment being ordered: Bilateral leg supports for manual wheelchair. 2 each 0     nitroglycerin (NITROSTAT) 0.4 MG SL tablet Place 1 tablet (0.4 mg) under the tongue every 5 minutes as needed for chest pain 30 tablet 1     blood glucose monitoring (NO BRAND SPECIFIED) lancets Use to test blood sugars 2 times daily or as directed. 1 Box 3     ORDER FOR DME Equipment being ordered: Lift chair.    Please see indications and face-to-face encounter details in 2/3/15 Office Visit note. 1 Device 0     acetaminophen (TYLENOL) 325 MG tablet Take 2 tablets (650 mg) by mouth every 6 hours (Patient taking differently: Take 650 mg by mouth as needed ) 100 tablet 0     bisacodyl (DULCOLAX) 5 MG EC tablet Take 1 tablet (5 mg) by mouth daily as needed for constipation 20 tablet 1       ROS:   Constitutional: Occasional chills.  No fever or diaphoresis. No weight gain/loss.   ENT: No visual disturbance, ear ache, epistaxis, sore throat.   Allergies/Immunologic: Negative.   Respiratory: No cough, hemoptysis.   Cardiovascular: As per HPI.   GI: One episode of nausea.  No vomiting, hematemesis, melena, or hematochezia.   : Urinating less.  Denies urinary frequency unless taking lasix,, dysuria, or hematuria.   Integument: Negative.   Psychiatric: Negative.   Neuro: Negative.    Endocrinology: Negative.  "  Musculoskeletal: Overall weakness.  Neck pain.    EXAM:  BP (!) 78/0 (BP Location: Right arm, Patient Position: Chair, Cuff Size: Adult Regular)  Pulse 60  Temp 97.7  F (36.5  C)  Ht 1.727 m (5' 8\")  Wt 81.6 kg (180 lb)  SpO2 91%  BMI 27.37 kg/m2  General: appears comfortable, alert and articulate, lying on stretcher.  Head: normocephalic, atraumatic  Eyes: anicteric sclera, EOMI  Neck: no adenopathy, neck supple. No thyromegaly or carotid bruits.   Orophyarynx: moist mucosa, no lesions, poor dentition.  Heart: regular, S1/S2, no murmur, gallop, rub, estimated JVP approximately 14 cm.  Lungs: clear, but decreased bilaterally.  No rales or wheezing  Abdomen: soft, non-tender, bowel sounds present, no hepatomegaly  Extremities: Trace edema.  No clubbing, cyanosis.  Neurological: normal speech and affect, no gross motor deficits    Labs:  CBC RESULTS:  Lab Results   Component Value Date    WBC 7.0 07/14/2017    RBC 3.39 (L) 07/14/2017    HGB 9.8 (L) 07/14/2017    HCT 32.4 (L) 07/14/2017    MCV 96 07/14/2017    MCH 28.9 07/14/2017    MCHC 30.2 (L) 07/14/2017    RDW 19.0 (H) 07/14/2017     07/14/2017       CMP RESULTS:  Lab Results   Component Value Date     07/14/2017    POTASSIUM 4.4 07/14/2017    CHLORIDE 111 (H) 07/14/2017    CO2 19 (L) 07/14/2017    ANIONGAP 8 07/14/2017     (H) 07/14/2017    BUN 27 07/14/2017    CR 2.18 (H) 07/14/2017    GFRESTIMATED 30 (L) 07/14/2017    GFRESTBLACK 37 (L) 07/14/2017    JOSÉ 8.3 (L) 07/14/2017    BILITOTAL 0.5 07/14/2017    ALBUMIN 3.0 (L) 07/14/2017    ALKPHOS 116 07/14/2017    ALT 24 07/14/2017    AST 37 07/14/2017        INR RESULTS:  Lab Results   Component Value Date    INR 3.14 (H) 07/14/2017       No components found for: CK  Lab Results   Component Value Date    MAG 1.8 06/29/2017     Lab Results   Component Value Date    NTBNP 1687 (H) 11/11/2015       Most recent echocardiogram: 4/10/16  Interpretation Summary  Suboptimal study to rule out " endocarditis. Technically difficult  Study.  Extremely poor acoustic windows.  ______________________________________________________________________________    Left Ventricle  Global Left ventricular function cannot be assessed.     Right Ventricle  The right ventricle cannot be assessed. A pacemaker lead is noted in the right ventricle.  Atria  The atria cannot be assessed.     Mitral Valve  The mitral valve is normal. Mild mitral annular calcification is present.     Aortic Valve  The aortic valve cannot be assessed.     Tricuspid Valve  The tricuspid valve cannot be assessed. Trace to mild tricuspid insufficiency is present. The right ventricular systolic pressure is approximated at 17.0 mmHg plus the right atrial pressure.     Pulmonic Valve  The pulmonic valve cannot be assessed.     Vessels  The aorta root cannot be assessed. The inferior vena cava cannot be assessed.  Pericardium Trivial pericardial effusion is present.     ______________________________________________________________________________     Doppler Measurements & Calculations  TR max trang: 206.0 cm/sec  TR max P.0 mmHg       Assessment and Plan:    Sohan is a 67 year old male with ICM s/p HM II LVAD who appears to be hypervolemic today on exam.  He will take Furosemide 20 mg daily x 3 days and then will take it as needed per prior recommendations.  His daughter is giving him a higher dose of Toprol XL 50 mg in the am and 25 mg in the pm.  He was reduced to 25 mg daily secondary to sepsis in the hospital. He will continue Toprol XL 50 mg in the am and 25 mg in the pm. He will follow up in [facility] in two weeks with a repeat echocardiogram prior.      1.  Chronic systolic heart failure secondary to ICM.  Stage D  NYHA Class IIIB  ACEi/ARB: yes, Losartan 25 mg daily.   BB: yes, Toprol XL 50 mg in the am, 25 mg in pm  Aldosterone antagonist: deferred while other medical therapy is prioritized  SCD prophylaxis: ICD  Fluid status:  hypervolemic  S/P LVAD implant as DT   Anticoagulation: Warfarin. INR 3.14 today, dosing per Anticoagulation clinic.  INR goal of 1.8-2.5  Antiplatelet:  Deferred per Dr. Lemons's recommendations in previous record review secondary to prior history of gross hematuria.       2.  S/p HM II LVAD implant as DT:  Anticoagulation: Warfarin. INR 3.14 today.  Suspect secondary to liver congestion.  Dosage adjusted per Anticoagulation clinic.  INR goal of 1.8-2.5 secondary to history of gross hematuria.  Antiplatelet:  Deferred per Dr. Lemons's recommendations in previous record review secondary to prior history of gross hematuria.   MAP:  78 today.   VAD Interrogation today: VAD interrogation reviewed with VAD coordinator. Agree with findings. Frequent PI events, without power spikes, speed drops, or other findings suspicious of pump malfunction noted.     3.  CKD:  Creatinine today is 2.18.  Suspect cardiorenal syndrome.  Will  monitor closely to see if diuresing improves kidney function.      4.  Hyperlipidemia: Controlled on  Atorvastatin 10 mg daily.  Last FLP 6/17.     5. Chronic UTI:  Primary MD is scheduled to see him for a home visit early next week.  Daughter will follow up with his primary MD with questions regarding long term antibiotic prophylaxis.      6.  Follow-up in [facility] clinic in two weeks with echocardiogram prior.   BMP in one week.        Vicky RANGEL, CNP  [phone]

## 2017-07-17 NOTE — TELEPHONE ENCOUNTER
Reason for call:  Symptom   Symptom or request: Right side neck pain near carotid artery     Duration (how long have symptoms been present): 1-2 days  Have you been treated for this before? No    Additional comments: Pt's HC nurse reports pt c/o pain near right side carotid artery. Relieved with tylenol. Nurse also reports abnormalities in recent labs ordered by cardiologist. Labs are available for review in Epic.    Phone number to reach patient:  Home number on file [phone] (home)    Best Time:  Any    Can we leave a detailed message on this number?  YES

## 2017-07-17 NOTE — MR AVS SNAPSHOT
Sohan Rendon   7/17/2017   Anticoagulation Therapy Visit    Description:  67 year old male   Provider:  Sarah Osborne, RN   [facility]           INR as of 7/17/2017     Today's INR 2.5      Anticoagulation Summary as of 7/17/2017     INR goal    Prior goal 1.8-2.5   Today's INR 2.5   Full instructions 7/17: 3 mg; 7/18: 3 mg; 7/19: 4.5 mg   Next INR check 7/20/2017    Indications   LVAD (left ventricular assist device) present [Z95.811]  Long-term (current) use of anticoagulants [Z79.01] [Z79.01]         July 2017 Details    Sun Mon Tue Wed Thu Fri Sat           1                 2               3               4               5               6               7               8                 9               10               11               12               13               14               15                 16               17      3 mg   See details      18      3 mg         19      4.5 mg         20            21               22                 23               24               25               26               27               28               29                 30               31                     Date Details   07/17 This INR check       Date of next INR:  7/20/2017         How to take your warfarin dose     To take:  3 mg Take 1 of the 3 mg tablets.    To take:  4.5 mg Take 1 of the 3 mg tablets and 1.5 of the 1 mg tablets.

## 2017-07-17 NOTE — PROGRESS NOTES
ANTICOAGULATION FOLLOW-UP CLINIC VISIT    Patient Name:  Sohan Rendon  Date:  7/17/2017  Contact Type:  Telephone    SUBJECTIVE:     Patient Findings     Positives Change in medications (took lasix 7/14, 7/15, and 7/16; will now go back to PRN), Other complaints (his urine is dark brown, N informing primary provider)           OBJECTIVE    INR   Date Value Ref Range Status   07/17/2017 2.5  Final     Chromogenic Factor 10   Date Value Ref Range Status   08/10/2014 24 (L) 70 - 130 % Final     Comment:     Therapeutic Range:  A Chromogenic Factor 10 level of approximately 20-40%   inversely correlates with an INR of 2-3 for patients receiving Warfarin.   Chromogenic Factor 10 levels below 20% indicate an INR greater than 3 and   levels above 40% indicate an INR less than 2.       Factor 2 Assay   Date Value Ref Range Status   07/21/2014 25 (L) 60 - 140 % Final     Comment:     Analyte Specific Reagents (ASRs) are used in many laboratory tests necessary   for   standard medical care and generally do not require FDA approval.  This test   was   developed and its performance characteristics determined by [facility].  It has not been cleared or approved by   the [place] Food and Drug Administration.         ASSESSMENT / PLAN  INR assessment SUPRA    Recheck INR In: 3 DAYS    [facility]R Location [facility]      Anticoagulation Summary as of 7/17/2017     INR goal    Prior goal 1.8-2.5   Today's INR 2.5   Maintenance plan No maintenance plan   Full instructions 7/17: 3 mg; 7/18: 3 mg; 7/19: 4.5 mg   Plan last modified Blanca Bah RN (5/30/2017)   Next INR check 7/20/2017   Priority INR   Target end date Indefinite    Indications   LVAD (left ventricular assist device) present [Z95.811]  Long-term (current) use of anticoagulants [Z79.01] [Z79.01]         Anticoagulation Episode Summary     INR check location     Preferred lab     Send INR reminders to [facility]     Comments Goal Range is 1.8-2.3  [facility] comes out to draw INR  Daughter Almaz Ph [phone]      Anticoagulation Care Providers     Provider Role Specialty Phone number    Evon Lemons MD Responsible Cardiology [phone]            See the Encounter Report to view Anticoagulation Flowsheet and Dosing Calendar (Go to Encounters tab in chart review, and find the Anticoagulation Therapy Visit)    Spoke with Sofya MCKINNON.  She reports Sohan's urine is dark brown. He has a history of intermittent blood in his urine.  MERVATN will report this to primary provider.  Will give Sohan a slightly lower coumadin dose and recheck INR in 3 days.    Sarah Osborne RN

## 2017-07-18 PROBLEM — S46.811A TRAPEZIUS STRAIN, RIGHT, INITIAL ENCOUNTER: Status: ACTIVE | Noted: 2017-01-01

## 2017-07-18 NOTE — PROGRESS NOTES
SUBJECTIVE:                                                      Sohan Rendon is a 67 year old male who has an LVAD due to severe ischemic cardiomyopathy. He has had recurrent embolic strokes, despite careful anticoagulation, with the LVAD believed to be the source. He was hospitalized 9/23 - 10/4/14 for acute ischemic subcortical stroke, superimposed upon previous R PICA and R MCA strokes. During that hospital stay, several discussions were carried out with the patient's family regarding the possibility of LVAD removal and conversion to Hospice. Per my discussion with his attending cardiologist, Dr. Lemons, family are well aware of the patient's terminal condition, but they steadfastly decline enrolling him into Hospice. They have wished to continue LVAD and other cares at home, while ensuring his comfort. Dr. Lemons made a referral for this patient to be seen at home by [facility] to provide primary care management, though the LVAD/Cardiology clinics will remain in charge of his cardiology care.       Mr. Rendon was seen today in his home along with a daughter and an  for the above issues, as well as for the following health issues:         Heart Failure Follow-up    Patient with end-stage cardiomyopathy, Stage D, [facility] IIIB. LVAD in place. History of recurrent emboli from LVAD despite careful anticoagulation. Patient is not a candidate for device revision or exchange per Cardiology notes.    Symptoms: No recent recurrence of vague left anterior chest pain that can persist for days at a time, and which he and I have previously attributed to muscular strain.    Shortness of breath: Happens at rest infrequently, but not worse.    Lower extremity edema: No more than trace pedal edema at baseline, currently stable.    Chest pain: See above.    Using more pillows than normal: N/A; has adjustable (hospital) bed.    Cough at night: Yes- occasional cough, present for months, believed to be due to  "non-cardiac causes.    Weight: Not checking weight daily; patient unable to weight bear.    Cardiology visits, ER/UC, or hospital admissions since last visit: Not for cardiac disease.    Medication side effects: None described to cardiac meds.    Seen most recently 7/14/17 in [facility], that note reviewed.  Furosemide 20 mg QD for a three day course was prescribed, which daughter gave.  No other change in Rx recommended.  Follow-up 8/9/17 with labs prior scheduled.          Cerebrovascular Follow-up    History of multiple embolic strokes, LVAD source, despite warfarin anticoagulation. Most recent documented stroke September 2014.    Residual symptoms: Dysarthria, dysphagia, left arm and leg weakness. Dysarthria has improved remarkably. Dense left hemiparesis is unimproved. Dysphagia has improved to permit PO feeding of an unrestricted texture diet.  Diagnosed with new-onset seizure disorder 6/2015; see below.    Worsened or new symptoms since last visit: None.    Daily aspirin use: Stopped during 9/2016 hospital stay for gross hematuria and remains on hold.  At Urology recommendation, ASA is on hold until hematuria confirmed resolved.  Warfarin has continued, though inpatient Urology notes suggest a more conservative INR target of 1.8 to 2.3 instead of the prior Cardiology target of 2.5 to 3.0 (indication: history of recurrent stroke associated with LVAD).  Dr. Lemons, Cardiology, has advised that we should continue to hold ASA, and continue to aim for INR 1.8 to 2.3, until further notice.          Seizures    Duration: Initially diagnosed during hospitalization of 6/2015. Was noted to have seizure in-house, levetiracetam started.    Description (location/character/radiation): Described as \"focal onset epilepsy\" in Neuro consultation.    Intensity: No witnessed seizure activity since return home from 6/2015 admission.    History (similar episodes/previous evaluation): Neuro consultation during hospital stay " 6/2015. No neuro follow-up scheduled.    Precipitating or alleviating factors: History of multiple embolic CVA due to LVAD thrombosis, most recently 9/2014.    Therapies tried and outcome: Levetiracetam 500 mg BID started 6/2015, no witnessed seizure activity since Rx started.  Patient has problems swallowing levetiracetam capsules due to their size, requests liquid if available.                                                                                   Amount of exercise or physical activity: None; essentially bed bound. Family says he can stand for brief periods of time if supported, cannot walk. There is a Jay lift at home used for transfer to chair or wheelchair. Family requested replacement manual wheelchair that will permit positioning of lower extremities given right leg fracture which I ordered 3/2016, and which has finally arrived.     Problems taking medications regularly: Has some mild dysphagia, but is not missing doses. Meds administered by daughters.    Medication side effects: Lethargic occasionally; daughter wonders if this could represent medication side effect.    Diet: regular (no restrictions). Daughters feed him. No witnessed choking or aspiration.        Hyperlipidemia Follow-Up       Rate your low fat/cholesterol diet?: Not carefully monitoring fat, but meals prepared by daughters are generally low fat.    Taking statin?  Yes, atorvastatin.  Reports no myalgia.    Other lipid medications/supplements?:  None.      Chronic Kidney Disease        Current NSAID use?  None.    GFR reviewed over past several months: GFR 50 to 53 2/2017, has demonstrated gradual decline since, currently 36 to 37.    Neck Pain      Duration: Last few days.    Description: Patient points out point tenderness over left upper trapezius, reproducible when I press on it with one finger.  No nuchal rigidity, no anterior neck pain.    Intensity:  Severe enough to prevent sleep last night, nearly absent  today.    Accompanying signs and symptoms: None.    History (similar episodes/previous evaluation): Bed bound due to complications of stroke.  Has previously reported occipital and right nuchal pain.    Precipitating or alleviating factors: I wonder if bed bound status and positioning of head up on pillow contributes.    Therapies tried and outcome: Acetaminophen was helpful last night, permitted sleep.  No other Rx tried.      Problem list and histories reviewed & adjusted, as indicated.  Additional history: as documented    BP Readings from Last 3 Encounters:   07/18/17 92/64   07/14/17 (!) 78/0   07/02/17 120/89    Wt Readings from Last 3 Encounters:   07/14/17 180 lb (81.6 kg)   07/01/17 177 lb 4.8 oz (80.4 kg)   05/15/17 190 lb 4.1 oz (86.3 kg)                  Labs reviewed in EPIC    Reviewed and updated as needed this visit by clinical staff       Reviewed and updated as needed this visit by Provider       ROS:  Obtained both from patient's daughters, as well as the patient himself via .   CONSTITUTIONAL: NEGATIVE for chills, fever, sweats.   EYES: Reports ongoing itching and pain from OD, saw Ophthalmology 6/27/17, ongoing use of ketotifen recommended, new bifocal Rx given followup advised in one year.  ENT/MOUTH: NEGATIVE for nasal congestion, sinus pressure. No recurrence of epistaxis since last visit.  RESP: NEGATIVE for dyspnea, wheeze, or respiratory chest pain. Cough has been mild/minimal since last visit.  CV: NEGATIVE for orthopnea, or worsened lower extremity edema.   GI: See above.  Constipation is now generally well-controlled on Senna 1 or 2 per day; has had daily BM.  : Mcarthur was removed by Urology 5/2017.  NEGATIVE for hematuria, dysuria, difficulties urinating, or flank pain.  Daughter remains very focused on urine color, presumes infection if urine becomes dark.   MUSCULOSKELETAL: NEGATIVE for change or worsening in right hip pain.  Neck pain described above.      INTEG: No  "rashes.  NEURO: NEGATIVE for new or worsened focal numbness or weakness or syncope.    PSYCHIATRIC: NEGATIVE for changes in mild baseline anxiety, no panic, no recent change in mood.         OBJECTIVE:     BP 92/64  Pulse 60  Resp 14  SpO2 99% on room air.  There is no height or weight on file to calculate BMI.    GENERAL: Appears clean, comfortable, and sleepy, propped upright in bed.  EYES: Eyes grossly normal to inspection, no conjunctivitis or discharge.  HENT: Nares patent by sniff, no discharge.    NECK: Trachea midline, no stridor.    RESP: No accessory muscle use. No cough during this visit. Symmetrical breath sounds. Lungs clear throughout on inspiration and expiration. Expiration not prolonged, no wheeze.  CV: Regular rate and rhythm, non-tachycardic. Prominent LVAD noise, which sounds like someone is running a vacuum  in the near background, makes exam difficult. S1 S2 softly audible, no murmur or extra sound. No lower extremity edema. Extremities slightly cool x4.  ABDOMEN: LVAD entry site not undressed for exam. Protuberant, though soft, non-tender, no guarding over all quadrants. Liver and spleen not palpable, no masses palpable. Bowel sounds positive.  : External genitalia normal.  MS: No bony deformities noted.  No red or inflamed joints.  See neck and upper back exam above.  SKIN: Warm and dry, no rashes where skin visible.    NEURO: Mostly alert, but dozed off a couple of times, conversant, oriented and appropriate in conversation per , though some very mild dysarthria present. Mild left facial droop noted, cranial nerves II - XII otherwise grossly intact.  Dense left upper and lower extremity paresis persists.  PSYCH: Calm, conversant. Language barrier prevents good exam, though affect appears normal.      ASSESSMENT/PLAN:     (J98.550H) Trapezius strain, right, initial encounter  (primary encounter diagnosis)  Comment: Patient's reported \"neck pain\" over the past two days " is point tenderness, reproducible my manual pressure, over the right upper trapezius.  Pain is mild to absent today.  I suspect this represents strain due to head being propped up on pillows in this bed bound patient.  Plan: Reassured patient and daughter that this pain was nothing ominous, and that I didn't think it merited him going to ED as was daughter's plan.  I suggested OTC Aspercreme topically PRN, and perhaps not propping his head quite so forward on pillows.  Daughter agrees.    (R33.8) Urinary retention due to (N40.1) Benign non-nodular prostatic hyperplasia with lower urinary tract symptoms  Comment: Patient was unable to void in-hospital, so Mcarthur was placed, and subsequently removed 5/2017 at outpatient Urology visit.  Urology has started finasteride and tamsulosin, both of which continue.  Plan: Missed Urology follow-up scheduled 6/2017 due to hospitalization; daughter will reschedule.  Finasteride and tamsulosin continue at least for now.      (D62) Acute posthemorrhagic anemia  Comment: Has element of (D50.0) Chronic anemia due to iron dieficiency, baseline Hgb has been in the 11's.  GI blood loss during 5/2017 hospital stay resulted in doris Hgb of 8.4 and discharge Hgb of 8.7.  He is having no melena or other blood loss.  He remains on warfarin, target INR 1.8 to 2.3 per Cardiology (lower INR selected initially due to gross hematuria).  Serum Fe was 44 on 6/22/17 labs, which were done one month after oral FeSO4 were stopped.  Hgb remains at recent baseline of 9.7.  Plan: Follow Hgb, recheck iron in a few months.  May need to restart FeSO4 if Hgb declines and/or if serum Fe drops.     (H57.8) Irritation of left eye  Comment: Patient has history of (H25.9) Cataract, right and (Z96.1) Pseudophakia right eye, S/P cataract surgery 11/2015 by Dr. Cosme. Saw Ophthalmology referral last month, ongoing use of ketotifen recommended, new bifocal Rx given, followup advised in one year.  Plan: Continue  "ketotifen gtts, add lubrication if needed.      (J30.9) Allergic rhinitis, unspecified allergic rhinitis type  Comment: Persistent symptoms.  Plan: Had to stop fluticasone (FLONASE) 50 MCG/ACT nasal spray due to epistaxis.  Continue loratadine, observe.      (I50.22) CHF (congestive heart failure), NYHA class IV, (Z95.811) LVAD (left ventricular assist device) present  Comment: Patient has recurrent embolic events from his LVAD, including recurrent stroke, despite careful anticoagulation. His LVAD cannot be exchanged, per my conversation with Dr. Lemons, Cardiology. Heart failure overall appears to be well-compensated today. VAD interrogated during recent cardiology visit and hospital stay, no abnormalities found.  At [facility] follow-up 7/14/17, furosemide 20 mg QD for three days was Rx'ed, is now complete.  Patient appears to be at his cardiac baseline.  Plan: Continue current care, though focusing on palliative care. He continues on warfarin as directed by [facility]. Continue to defer management of rhythm, hemodynamics, anticoagulation to the Cardiology/LVAD Clinic.  Previous INR target was 2.5 to 3.0 per Cardiology, but a lower target INR of 1.8 to 2.3 is currently being used due to recurrent gross hematuria.  ASA reamains on hold at Cardiology advice due to hematuria.  These INR and ASA modifications are to continue until further notice, per message from Cardiology.  Next Cardiology follow-up 8/9/17.  I'm going to send a note to Dr. Evon Lemons in anticipation of that visit regarding my concern about declining GFR; see below.      (G47.01) Insomnia due to medical condition    Comment: Episodic sleep-initiation difficulties, not severe.  He is using acetaminophen/diphenhydramine and melatonin OTC with some relief.  Plan: Continue \"Tylenol PM\" OTC, (acetaminophen/diphenhydramine) and melatonin 3 mg at HS PRN.      (R56.9) Focal onset epilepsy  Comment: Seizure at home 6/2015 prompted hospitalization, patient " now on levetiracetam. No additional seizure activity noted since Rx started.   Plan: Continue levetiracetam 500 mg Q12H, observe for seizure recurrence.  Patient describes dysphagia to large levetiracetam capsules; I'll see if insurance will cover liquid.      (I63.411) Cerebrovascular accident 9/2014 due to embolism of R MCA  Comment: Residual deficits include dysarthria, which has improved remarkably, dysphagia (see below), dense left hemiparesis, and new-onset epilepsy as of 6/2015. Given that source of embolic strokes is from LVAD, and that embolism cannot be prevented despite anticoagulation, future events are possible or even likely. He had what appears to have been a TIA during 4/2016 hospital stay when warfarin and ASA were held due to gross hematuria, but subsequent neuro workup did not reveal new stroke.  Neuro status has been stable since that time.  Plan: Warfarin anticoagulation continues per Cardiology, ASA held due to hematuria, details above.  Observe for new events.      (R13.10) Dysphagia  Comment: Had bedside swallow evaluation from Speech Therapy 12/2014. He had timbo aspiration only to thin liquids. Per daughters, although they have to feed him due to weakness from stroke, he appears to swallow without choking or aspiration episodes.  The only dysphagia they notice is to large meds like levetiracetam.  Plan: Continue speech therapy recommendations: patient should be sitting straight up when eating, take small bolus sizes, eat slowly, not talk during eating. Continue PO diet, I'll see if I can get liquid levetiracetam.     (E78.5) Hyperlipidemia  Comment: Remains on atorvastatin, lipids well-controlled on 6/22/17 labs.  Plan:  Continue atorvastatin.     (N18.3) Chronic kidney disease  Comment: Etiology unclear; pre-renal due to CHF may be the primary cause.  Is on no nephrotoxins.  GFR has demonstrated gradual decline over past 6 months, from around 52 to around 36.  Unclear why this is  happening: worsened heart function vs ARB therapy with losartan vs other.  He has no significant metabolic abnormalities due to renal failure, and I doubt his anemia is due to renal disease at GFR 36.  Plan: I'm going to ask for repeat GFR later this week when INR drawn.  If unimproved, will ask Cardiology if trial of losartan dose reduction or elimination is warranted.         FUTURE LABS:       - Schedule a non-fasting blood draw 7/20/17 when INR drawn to recheck GFR.    FUTURE APPOINTMENTS:       - Follow-up visit scheduled for 8/15/17 at 1400.    Face to face time was 51 minutes, at least 50% of which was spent in counseling regarding management of neck pain, declining renal function, persistent anemia, and heart failure.      Thomas Dill MD  [facility]

## 2017-07-18 NOTE — TELEPHONE ENCOUNTER
Information noted.  Unsure why [facility] RN doesn't alert Cardiology to abnormalities in labs which they ordered?

## 2017-07-18 NOTE — Clinical Note
I ordered some future labs.  He is due to have his INR drawn Thursday, 7/20/17, so hopefully labs can be drawn at the same time.  Please keep the 8/15/17 follow-up.

## 2017-07-18 NOTE — PATIENT INSTRUCTIONS
1.  I ordered some labs for Thursday, July 20th, which I hope can be drawn at the same time your INR is drawn.  I'll let you know the results when I get them.    2.  We made no medication changes today, though you may wish to try Aspercreme, which you can get from any pharmacy, and apply it to the painful spot(s) on your neck four times a day as you need to.    3.  I'll see you next on Tuesday, August 15th at 2 PM.

## 2017-07-18 NOTE — TELEPHONE ENCOUNTER
Spoke with Sofya ESCOBAR with [facility]. States labs yesterday were abnormal, but similar to labs done last week. His urine was dark yesterday and his kidney function is getting worse. He was c/o new pain in carotid artery area of his neck. Symptoms started 2 days ago. Hard to get him to describe the pain d/t language barrier, but described as a shooting pain. Seemed confused 2 days ago. VSS HR 60, O2 99%, T 97.8. Does not take his BP due to LVAD and it is usually not accurate. Sofya was informed that PCP is seeing pt today. Will route as FYI.    Kelin Jon RN  [facility] [place]

## 2017-07-18 NOTE — MR AVS SNAPSHOT
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)        Coronary artery disease involving native coronary artery of native heart with unstable angina pectoris (H)        CHF (congestive heart failure), NYHA class IV, chronic, systolic (H)        Stage 4 chronic kidney disease (H)        Pseudophakia, right eye        Insomnia due to medical condition        Chronic nonseasonal allergic rhinitis due to pollen        Irritation of right eye        Urinary retention        Acute posthemorrhagic anemia        Iron deficiency anemia due to chronic blood loss        Hyperlipidemia, unspecified hyperlipidemia type        Benign prostatic hyperplasia with urinary retention        Oropharyngeal dysphagia        Seizure (H)        Age-related cataract of right eye, unspecified age-related cataract type        LVAD (left ventricular assist device) present          Care Instructions    1.  I ordered some labs for Thursday, July 20th, which I hope can be drawn at the same time your INR is drawn.  I'll let you know the results when I get them.    2.  We made no medication changes today, though you may wish to try Aspercreme, which you can get from any pharmacy, and apply it to the painful spot(s) on your neck four times a day as you need to.    3.  I'll see you next on Tuesday, August 15th at 2 PM.          Follow-ups after your visit        Your next 10 appointments already scheduled     Aug 09, 2017  9:30 AM CDT   Lab with  LAB   [facility] ([place] [facility])    [address] [place] [address]  Floor  [facility] [address]   [phone]            Aug 09, 2017 10:00 AM CDT   (Arrive by 9:45 AM)   Ventricular Assist Device with Vicky Ziegler NP   [facility] ([facility])    [address] [place] [address]  3rd Floor  [facility] [address]   [phone]            Aug 15, 2017  2:00 PM CDT   Return Visit with Thomas Dill MD   [facility] ([facility])    [address]  [address]  [facility] [address]   [phone]            Sep 12, 2017  2:00 PM CDT   Return Visit with Thomas Dill MD   [facility] ([facility])    [address]  [address]  [facility] [address]   [phone]            Oct 10, 2017  2:00 PM CDT   Return Visit with Thomas Dill MD   [facility] ([facility])    [address]  [address]  [facility] [address]   [phone]            Nov 14, 2017  2:00 PM CST   Return Visit with Thomas Dill MD   [facility] ([facility])    [address]  [address]  [facility] [address]   [phone]            Dec 12, 2017  2:00 PM CST   Return Visit with Thomas Dill MD   [facility] ([facility])    [address]  [address]  [facility] [address]   [phone]              Who to contact     If you have questions or need follow up information about today's clinic visit or your schedule please contact [facility] directly at [phone].  Normal or non-critical lab and imaging results will be communicated to you by MyChart, letter or phone within 4 business days after the clinic has received the results. If you do not hear from us within 7 days, please contact the clinic through MyChart or phone. If you have a critical or abnormal lab result, we will notify you by phone as soon as possible.  Submit refill  "requests through [employer] or call your pharmacy and they will forward the refill request to us. Please allow 3 business days for your refill to be completed.          Additional Information About Your Visit        "[employer] Information     [employer] lets you send messages to your doctor, view your test results, renew your prescriptions, schedule appointments and more. To sign up, go to [email or web address] . Click on \"Log in\" on the left side of the screen, which will take you to the Welcome page. Then click on \"Sign up Now\" on the right side of the page.     You will be asked to enter the access code listed below, as well as some personal information. Please follow the directions to create your username and password.     Your access code is: [email or web address]  Expires: 2017  8:55 AM     Your access code will  in 90 days. If you need help or a new code, please call your [place] clinic or [phone].        Care EveryWhere ID     This is your Care EveryWhere ID. This could be used by other organizations to access your [place] medical records  [id number]        Your Vitals Were     Pulse Respirations Pulse Oximetry             60 14 99%          Blood Pressure from Last 3 Encounters:   17 99/83   17 92/64   17 (!) 78/0    Weight from Last 3 Encounters:   17 179 lb 14.3 oz (81.6 kg)   17 180 lb (81.6 kg)   17 177 lb 4.8 oz (80.4 kg)                 Today's Medication Changes          These changes are accurate as of: 17 11:59 PM.  If you have any questions, ask your nurse or doctor.               These medicines have changed or have updated prescriptions.        Dose/Directions    acetaminophen 325 MG tablet   Commonly known as:  TYLENOL   This may have changed:    - when to take this  - reasons to take this   Used for:  Femoral neck fracture, right, closed, initial encounter        Dose:  650 mg   Take 2 tablets (650 mg) by mouth every 6 hours   Quantity:  100 tablet "   Refills:  0                Primary Care Provider Office Phone # Fax #    Thomas Dill [phone]        [facility] [address] [facility] [address]     [facility] [address]        Equal Access to Services     ALCON SKINNER : Rocky emily kaur jolene Soedna, waaxda luqadaha, qaybta kaalmada adeegyada, willie cabrales lakia quiñonez. So [facility] [phone].    ATENCIÓN: Si habla español, tiene a smith disposición servicios gratuitos de asistencia lingüística. Llame al [phone].    We comply with applicable federal civil rights laws and [place] laws. We do not discriminate on the basis of race, color, national origin, age, disability sex, sexual orientation or gender identity.            Thank you!     Thank you for choosing [facility]  for your care. Our goal is always to provide you with excellent care. Hearing back from our patients is one way we can continue to improve our services. Please take a few minutes to complete the written survey that you may receive in the mail after your visit with us. Thank you!             Your Updated Medication List - Protect others around you: Learn how to safely use, store and throw away your medicines at www.disposemymeds.org.          This list is accurate as of: 7/18/17 11:59 PM.  Always use your most recent med list.                   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    Hypoglycemia       * bisacodyl 5 MG EC tablet    DULCOLAX    20 tablet    Take 1 tablet (5 mg) by mouth daily as needed for constipation    Constipation       * bisacodyl 10 MG Suppository    DULCOLAX    5 suppository    Place 1 suppository (10 mg) rectally daily as needed for constipation    Drug-induced constipation       blood glucose monitoring lancets     1 Box    Use to test blood sugars 2 times daily or as directed.    Diabetes mellitus, type 2 (H)       calcium carbonate-vitamin D 500-400 MG-UNIT Tabs per tablet     90 tablet    Take 1 tablet by mouth daily    Cardiac arrhythmia, unspecified cardiac arrhythmia type       finasteride 5 MG tablet    PROSCAR    90 tablet    Take 1 tablet (5 mg) by mouth daily    Incomplete bladder emptying       furosemide 20 MG tablet    LASIX    20 tablet    Take 1 tablet (20 mg) by mouth as needed    Chronic systolic congestive heart failure (H)       ketotifen 0.025 % Soln ophthalmic solution    ZADITOR/REFRESH ANTI-ITCH    1 Bottle    Place 1 drop into both eyes 2 times daily    Allergic conjunctivitis, bilateral       levETIRAcetam 750 MG tablet    KEPPRA    180 tablet    Take 1 tablet (750 mg) by mouth 2 times daily    Convulsions, unspecified convulsion type (H)       loratadine 10 MG tablet    CLARITIN    90 tablet    Take 1 tablet (10 mg) by mouth daily    Allergic rhinitis, unspecified allergic rhinitis type       losartan 25 MG tablet    COZAAR    45 tablet    Take 1 tablet (25 mg) by mouth daily    CHF (congestive heart failure), NYHA class IV, chronic, systolic (H)       magnesium oxide 400 MG tablet    MAG-OX    14 tablet    Take 1 tablet (400 mg) by mouth 2 times daily    Abdominal distention, Drug-induced constipation       melatonin 3 MG tablet      Take  1 tablet (3 mg) by mouth At Bedtime    Insomnia, unspecified type       metoprolol 25 MG 24 hr tablet    TOPROL-XL    90 tablet    TAKE 1 TABLET (25 MG) BY MOUTH EVERY EVENING    LVAD (left ventricular assist device) present (H)       metoprolol 25 MG tablet    LOPRESSOR    60 tablet    Take 50 mg (2 tablets) in the morning and 25 mg (1 tablet) in the evening.    Chronic systolic congestive heart failure (H)       nitroGLYcerin 0.4 MG sublingual tablet    NITROSTAT    30 tablet    Place 1 tablet (0.4 mg) under the tongue every 5 minutes as needed for chest pain    Coronary artery disease involving native coronary artery with unstable angina pectoris (H)       nystatin 215779 UNIT/GM Powd    MYCOSTATIN    60 g    Apply to affected areas of skin in the groin and on the scrotum three times a day as needed.        * order for DME     1 Device    Equipment being ordered: Lift chair.  Please see indications and face-to-face encounter details in 2/3/15 Office Visit note.    Fracture of femoral neck, right, closed, initial encounter, Stroke (H), CHF (congestive heart failure), NYHA class IV (H)       * order for DME     2 each    Equipment being ordered: Bilateral leg supports for manual wheelchair.    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  Please fax to [facility] [place]; I called them 11/26/16 and they requested the new order.  Face to face is in my 10/26/16 note.    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, subsequent encounter       pantoprazole 40 MG EC tablet    PROTONIX    180 tablet    Take 1 tablet (40 mg) by mouth daily    Gastrointestinal hemorrhage associated with chronic superficial gastritis       SENEXON-S 8.6-50 MG per tablet   Generic drug:  senna-docusate     60 tablet    TAKE 1-2 TABLETS BY MOUTH 2 TIMES DAILY AS NEEDED FOR CONSTIPATION    Slow transit constipation       simethicone 80 MG chewable tablet    MYLICON    180 tablet    Take 1 tablet (80 mg) by mouth 4 times daily    Slow transit constipation       tamsulosin 0.4 MG capsule    FLOMAX    90 capsule    Take 1 capsule (0.4 mg) by mouth daily    Incomplete bladder emptying       thiamine 100 MG tablet     90 tablet    TAKE 1 TABLET (100 MG) BY MOUTH DAILY    Cerebrovascular accident (CVA) due to embolism of right middle cerebral artery (H)       warfarin 3 MG tablet    COUMADIN    180 tablet    Take 3mgtonight and tomorrow night. Follow up with coumadin clinic on Tuesday for further dosing     Cerebrovascular accident (CVA) due to embolism of right middle cerebral artery (H)       * Notice:  This list has 9 medication(s) that are the same as other medications prescribed for you. Read the directions carefully, and ask your doctor or other care provider to review them with you.

## 2017-07-18 NOTE — TELEPHONE ENCOUNTER
Sofya was advised to contact cardiology with abnormal lab results. States she did call yesterday, but has not heard back. Will call them again and will also let them know of the sx pt is experiencing.    Kelin Jon RN  [facility]

## 2017-07-20 NOTE — PROGRESS NOTES
Indication of Interrogation:  Other - ED visit for LUE weakness. Pt family reports dark urine    Type of VAD:  Heartmate 2    Current Parameters:  Flow= 4.3 lpm, Speed= 8800 rpm, Power= 5.2 capps, PI (if applicable)= 4.5    Abnormal Alarm on History:  No    Abnormal Events/Parameters Notes:  Yes, explain: several PI events with history of only 3 days.     Changes Made during Interrogation:  No

## 2017-07-20 NOTE — MR AVS SNAPSHOT
[facility]   7/20/2017   Anticoagulation Therapy Visit    Description:  67 year old male   Provider:  Sarah Martinez, RN   [facility]           INR as of 7/20/2017     Today's INR 2.40      Anticoagulation Summary as of 7/20/2017     INR goal    Prior goal 1.8-2.5   Today's INR 2.40   Full instructions 7/20: 3 mg; 7/21: 3 mg; 7/22: 4.5 mg; 7/23: 3 mg   Next INR check 7/24/2017    Indications   LVAD (left ventricular assist device) present [Z95.811]  Long-term (current) use of anticoagulants [Z79.01] [Z79.01]         July 2017 Details    Sun Mon Tue Wed Thu Fri Sat           1                 2               3               4               5               6               7               8                 9               10               11               12               13               14               15                 16               17               18               19               20      3 mg   See details      21      3 mg         22      4.5 mg           23      3 mg         24            25               26               27               28               29                 30               31                     Date Details   07/20 This INR check       Date of next INR:  7/24/2017         How to take your warfarin dose     To take:  3 mg Take 1 of the 3 mg tablets.    To take:  4.5 mg Take 1.5 of the 3 mg tablets.

## 2017-07-20 NOTE — ED AVS SNAPSHOT
[facility], [place], Emergency Department    [address] [facility] [address]    Phone:  [phone]                                       Sohan Rednon   MRN: [id number]    Department:  [facility], Emergency Department   Date of Visit:  7/20/2017           After Visit Summary Signature Page     I have received my discharge instructions, and my questions have been answered. I have discussed any challenges I see with this plan with the nurse or doctor.    ..........................................................................................................................................  Patient/Patient Representative Signature      ..........................................................................................................................................  Patient Representative Print Name and Relationship to Patient    ..................................................               ................................................  Date                                            Time    ..........................................................................................................................................  Reviewed by Signature/Title    ...................................................              ..............................................  Date                                                            Time

## 2017-07-20 NOTE — ED AVS SNAPSHOT
[facility], Emergency Department    [address] [address]    Phone:  [phone]                                       Sohan Rendon   MRN: [id number]    Department:  [facility], Emergency Department   Date of Visit:  7/20/2017           Patient Information     Date Of Birth          1950        Your diagnoses for this visit were:     Hematuria        You were seen by Frank Taylor MD.        Discharge Instructions       Please make an appointment to follow up with Dr. Lemons as soon as possible.    Return if pump numbers change, unable to eat or worse trouble breathing.    If pump numbers change call the LVAD coordinator on call.      Future Appointments        Provider Department Dept Phone Center    8/9/2017 9:30 AM Lab [facility] Lab [phone] [facility]    8/9/2017 10:00 AM Vicky Ziegler NP [facility] [phone] [facility]    8/15/2017 2:00 PM Thomas Dill MD [facility] [phone] [facility]    9/12/2017 2:00 PM Thomas Dill MD [facility] [phone] [facility]    10/10/2017 2:00 PM Thomas Dill MD [facility] [phone] [facility]    11/14/2017 2:00 PM Thomas Dill MD [facility] [phone] [facility]    12/12/2017 2:00 PM Thomas Dill MD [facility] [phone] [facility]      24 Hour Appointment Hotline       To make an appointment at any [facility], call [phone] ([phone]). If you don't have a family doctor or clinic, we will help you find one. [facility] clinics are conveniently located to serve the needs of you and your family.             Review of your medicines      Our records show that you are taking the medicines listed below. If these are incorrect, please call your family doctor or clinic.        Dose / Directions Last dose taken    acetaminophen 325 MG tablet   Commonly known as:  TYLENOL   Dose:  650 mg   Quantity:  100 tablet         Take 2 tablets (650 mg) by mouth every 6 hours   Refills:  0        allopurinol 100 MG tablet   Commonly known as:  ZYLOPRIM   Dose:  100 mg   Quantity:  90 tablet        Take 1 tablet (100 mg) by mouth daily   Refills:  3        ASPIRIN NOT PRESCRIBED   Commonly known as:  INTENTIONAL   Quantity:  0 each        Please choose reason not prescribed, below   Refills:  0        atorvastatin 10 MG tablet   Commonly known as:  LIPITOR   Quantity:  90 tablet        TAKE 1 TABLET (10 MG) BY MOUTH DAILY   Refills:  3        ALEK CONTOUR test strip   Quantity:  100 strip   Generic drug:  blood glucose monitoring        USE TO TEST BLOOD SUGAR 2 TIMES DAILY OR AS DIRECTED.   Refills:  1        * bisacodyl 5 MG EC tablet   Commonly known as:  DULCOLAX   Dose:  5 mg   Quantity:  20 tablet        Take 1 tablet (5 mg) by mouth daily as needed for constipation   Refills:  1        * bisacodyl 10 MG Suppository   Commonly known as:  DULCOLAX   Dose:  10 mg   Quantity:  5 suppository        Place 1 suppository (10 mg) rectally daily as needed for constipation   Refills:  1        blood glucose monitoring lancets   Quantity:  1 Box        Use to test blood sugars 2 times daily or as directed.   Refills:  3        calcium carbonate-vitamin D 500-400 MG-UNIT Tabs per tablet   Dose:  1 tablet   Quantity:  90 tablet        Take 1 tablet by mouth daily   Refills:  3        finasteride 5 MG tablet   Commonly known as:  PROSCAR   Dose:  5 mg   Quantity:  90 tablet        Take 1 tablet (5 mg) by mouth daily   Refills:  3        furosemide 20 MG tablet   Commonly known as:  LASIX   Dose:  20 mg   Quantity:  20 tablet        Take 1 tablet (20 mg) by mouth as needed   Refills:  11        ketotifen 0.025 % Soln ophthalmic solution   Commonly known as:  ZADITOR/REFRESH ANTI-ITCH   Dose:  1 drop   Quantity:  1 Bottle        Place 1 drop into both eyes 2 times daily   Refills:  11        levETIRAcetam 750 MG tablet   Commonly known as:  KEPPRA    Dose:  750 mg   Quantity:  180 tablet        Take 1 tablet (750 mg) by mouth 2 times daily   Refills:  3        loratadine 10 MG tablet   Commonly known as:  CLARITIN   Dose:  10 mg   Quantity:  90 tablet        Take 1 tablet (10 mg) by mouth daily   Refills:  3        losartan 25 MG tablet   Commonly known as:  COZAAR   Dose:  25 mg   Quantity:  45 tablet        Take 1 tablet (25 mg) by mouth daily   Refills:  3        magnesium oxide 400 MG tablet   Commonly known as:  MAG-OX   Dose:  400 mg   Quantity:  14 tablet        Take 1 tablet (400 mg) by mouth 2 times daily   Refills:  0        melatonin 3 MG tablet   Dose:  3 mg        Take 1 tablet (3 mg) by mouth At Bedtime   Refills:  0        metoprolol 25 MG 24 hr tablet   Commonly known as:  TOPROL-XL   Quantity:  90 tablet        TAKE 1 TABLET (25 MG) BY MOUTH EVERY EVENING   Refills:  3        metoprolol 25 MG tablet   Commonly known as:  LOPRESSOR   Quantity:  60 tablet        Take 50 mg (2 tablets) in the morning and 25 mg (1 tablet) in the evening.   Refills:  0        nitroGLYcerin 0.4 MG sublingual tablet   Commonly known as:  NITROSTAT   Dose:  0.4 mg   Quantity:  30 tablet        Place 1 tablet (0.4 mg) under the tongue every 5 minutes as needed for chest pain   Refills:  1        nystatin 627798 UNIT/GM Powd   Commonly known as:  MYCOSTATIN   Quantity:  60 g        Apply to affected areas of skin in the groin and on the scrotum three times a day as needed.   Refills:  3        * order for DME   Quantity:  1 Device        Equipment being ordered: Lift chair.  Please see indications and face-to-face encounter details in 2/3/15 Office Visit note.   Refills:  0        * order for DME   Quantity:  2 each        Equipment being ordered: Bilateral leg supports for manual wheelchair.   Refills:  0        * order for DME   Quantity:  1 each        Equipment being ordered: Reclining manual w/c with bilateral elevating leg rests.   Refills:  0        * order for  DME   Quantity:  1 each        Equipment being ordered: Hospital Bed, fully electric.   Refills:  0        * order for DME   Quantity:  1 each        Equipment being ordered: Wheelchair, manual.   Refills:  0        * order for DME   Quantity:  1 each        Equipment being ordered: Wheelchair, manual, with elevated leg rests and tilt in space back.  Please fax to [facility]; I called them 11/26/16 and they requested the new order.  Face to face is in my 10/26/16 note.   Refills:  0        * order for DME   Quantity:  2 each        Equipment being ordered: Cushioned heel boots.   Refills:  0        oxyCODONE 5 MG IR tablet   Commonly known as:  ROXICODONE   Dose:  5 mg   Quantity:  30 tablet        Take 1 tablet (5 mg) by mouth every 4 hours as needed for moderate to severe pain   Refills:  0        pantoprazole 40 MG EC tablet   Commonly known as:  PROTONIX   Dose:  40 mg   Quantity:  180 tablet        Take 1 tablet (40 mg) by mouth daily   Refills:  3        SENEXON-S 8.6-50 MG per tablet   Quantity:  60 tablet   Generic drug:  senna-docusate        TAKE 1-2 TABLETS BY MOUTH 2 TIMES DAILY AS NEEDED FOR CONSTIPATION   Refills:  3        simethicone 80 MG chewable tablet   Commonly known as:  MYLICON   Dose:  80 mg   Quantity:  180 tablet        Take 1 tablet (80 mg) by mouth 4 times daily   Refills:  5        tamsulosin 0.4 MG capsule   Commonly known as:  FLOMAX   Dose:  0.4 mg   Quantity:  90 capsule        Take 1 capsule (0.4 mg) by mouth daily   Refills:  3        thiamine 100 MG tablet   Quantity:  90 tablet        TAKE 1 TABLET (100 MG) BY MOUTH DAILY   Refills:  3        warfarin 3 MG tablet   Commonly known as:  COUMADIN   Quantity:  180 tablet        Take 3mgtonight and tomorrow night. Follow up with coumadin clinic on Tuesday for further dosing   Refills:  3        * Notice:  This list has 9 medication(s) that are the same as other medications prescribed for you. Read the directions carefully, and ask your  "doctor or other care provider to review them with you.            Procedures and tests performed during your visit     Basic metabolic panel    CBC with platelets differential    EKG 12-lead, tracing only    INR    Lactate Dehydrogenase    Lactic acid whole blood    Peripheral IV catheter    UA with Microscopic    XR Chest Port 1 View      Orders Needing Specimen Collection     None      Pending Results     Date and Time Order Name Status Description    7/20/2017 1427 EKG 12-lead, tracing only Preliminary             Pending Culture Results     No orders found from 7/18/2017 to 7/21/2017.            Pending Results Instructions     If you had any lab results that were not finalized at the time of your Discharge, you can call the ED Lab Result RN at [phone]. You will be contacted by this team for any positive Lab results or changes in treatment. The nurses are available 7 days a week from 10A to 6:30P.  You can leave a message 24 hours per day and they will return your call.        Thank you for choosing [facility]       Thank you for choosing [facility] for your care. Our goal is always to provide you with excellent care. Hearing back from our patients is one way we can continue to improve our services. Please take a few minutes to complete the written survey that you may receive in the mail after you visit with us. Thank you!        Can[facility] [employer] Information     [employer] lets you send messages to your doctor, view your test results, renew your prescriptions, schedule appointments and more. To sign up, go to [email or web address] . Click on \"Log in\" on the left side of the screen, which will take you to the Welcome page. Then click on \"Sign up Now\" on the right side of the page.     You will be asked to enter the access code listed below, as well as some personal information. Please follow the directions to create your username and password.     Your access code is: [email or web address]  Expires: 9/12/2017  8:55 AM     Your " access code will  in 90 days. If you need help or a new code, please call your [facility] clinic or [phone].        Care EveryWhere ID     This is your Care EveryWhere ID. This could be used by other organizations to access your [facility] medical records  [id number]        Equal Access to Services     ALCON SKINNER : Rocky matuteo Soedna, waaxda luqadaha, qaybta kaalmada rose, willie quiñonez. So [facility] [phone].    ATENCIÓN: Si habla español, tiene a smith disposición servicios gratuitos de asistencia lingüística. Llame al [phone].    We comply with applicable federal civil rights laws and [place] laws. We do not discriminate on the basis of race, color, national origin, age, disability sex, sexual orientation or gender identity.            After Visit Summary       This is your record. Keep this with you and show to your community pharmacist(s) and doctor(s) at your next visit.

## 2017-07-20 NOTE — ED NOTES
Patient presents via ambulance with complaints of generalized weakness and tea-colored urine x 4 days. Patient has home health nurse that told patient to come in if urine continued to be dark. Patient does have LVAD.

## 2017-07-20 NOTE — DISCHARGE INSTRUCTIONS
Please make an appointment to follow up with Dr. Lemons as soon as possible.    Return if pump numbers change, unable to eat or worse trouble breathing.    If pump numbers change call the LVAD coordinator on call.

## 2017-07-20 NOTE — PROGRESS NOTES
ANTICOAGULATION FOLLOW-UP CLINIC VISIT    Patient Name:  Sohan Rendon  Date:  7/20/2017  Contact Type:  Telephone    SUBJECTIVE:     Patient Findings     Positives No Problem Findings           OBJECTIVE    INR   Date Value Ref Range Status   07/20/2017 2.40  Final     Comment:     Home Care      Chromogenic Factor 10   Date Value Ref Range Status   08/10/2014 24 (L) 70 - 130 % Final     Comment:     Therapeutic Range:  A Chromogenic Factor 10 level of approximately 20-40%   inversely correlates with an INR of 2-3 for patients receiving Warfarin.   Chromogenic Factor 10 levels below 20% indicate an INR greater than 3 and   levels above 40% indicate an INR less than 2.       Factor 2 Assay   Date Value Ref Range Status   07/21/2014 25 (L) 60 - 140 % Final     Comment:     Analyte Specific Reagents (ASRs) are used in many laboratory tests necessary   for   standard medical care and generally do not require FDA approval.  This test   was   developed and its performance characteristics determined by [facility].  It has not been cleared or approved by   the US Food and Drug Administration.         ASSESSMENT / PLAN  INR assessment THER    Recheck INR In: 4 DAYS    [facility]      Anticoagulation Summary as of 7/20/2017     INR goal    Prior goal 1.8-2.5   Today's INR 2.40   Maintenance plan No maintenance plan   Full instructions 7/20: 3 mg; 7/21: 3 mg; 7/22: 4.5 mg; 7/23: 3 mg   Plan last modified Blanca Bah RN (5/30/2017)   Next INR check 7/24/2017   Priority INR   Target end date Indefinite    Indications   LVAD (left ventricular assist device) present [Z95.811]  Long-term (current) use of anticoagulants [Z79.01] [Z79.01]         Anticoagulation Episode Summary     INR check location     Preferred lab     Send INR reminders to [facility]    Comments Goal Range is 1.8-2.3  [facility] comes out to draw INR  Daughter Almaz Ph [phone]      Anticoagulation Care Providers     Provider Role Specialty Phone number    Evon Lemons MD Responsible Cardiology [phone]            See the Encounter Report to view Anticoagulation Flowsheet and Dosing Calendar (Go to Encounters tab in chart review, and find the Anticoagulation Therapy Visit)    Spoke with INO Cowart. Gave them new warfarin recommendation.  No changes in health, medication, or diet. No missed doses, no falls. No signs or symptoms of bleed or clotting.     Sarah Martinez RN

## 2017-07-20 NOTE — ED PROVIDER NOTES
History     Chief Complaint   Patient presents with     Generalized Weakness     HPI  Sohan Rendon is a 67 year old male who has a history of an LVAD since 2015.  Prior to that he had had a stroke with paralysis on the left side.  The patient has a chronic clot in his LVAD and is anticoagulated at this time.  Over the last 4 days he has had darker urine and feels very weak and washed out.  He has not had any fever or chills.  He has a mild cough that his daughter noticed yesterday but it seems to be better today.  He also has been listing off to the right more while sitting but he is nonambulatory.  He denies any weakness on the right side and denies any pain in her right now.  He has had poor by mouth intake but daughter feels that he has had enough fluids to drink.  She denies any fever or chills at home.    Daughter and patient prefer to use her is the .    I have reviewed the Medications, Allergies, Past Medical and Surgical History, and Social History in the Epic system.    Review of Systems   Constitutional: Positive for fatigue. Negative for fever.   Respiratory: Positive for shortness of breath (mild).    Cardiovascular: Negative for chest pain.   Gastrointestinal: Negative for abdominal pain.   All other systems reviewed and are negative.      Physical Exam   BP: (!) 61/39  Pulse: 60  Temp: 98.2  F (36.8  C)  Resp: 18  Weight: 81.6 kg (179 lb 14.3 oz)  SpO2: 100 %  Physical Exam   Constitutional: He appears listless.   HENT:   Head: Atraumatic.   Mouth/Throat: Oropharynx is clear and moist. No oropharyngeal exudate.   Eyes: Pupils are equal, round, and reactive to light. No scleral icterus.   Cardiovascular: Normal heart sounds and intact distal pulses.    Pulmonary/Chest: Breath sounds normal. No respiratory distress.   Abdominal: Soft. Bowel sounds are normal. There is no tenderness.   Musculoskeletal: He exhibits no edema or tenderness.   Neurological: He appears listless. No cranial nerve  deficit. GCS eye subscore is 4. GCS verbal subscore is 5. GCS motor subscore is 6.   Good strength on the right side.  Weak on the left with complete paralysis   Skin: Skin is warm. No rash noted. He is not diaphoretic.       ED Course     ED Course     Procedures             EKG Interpretation:      Interpreted by Frank Taylor  Time reviewed: 1500  Symptoms at time of EKG: LVAD assessment   Rhythm: Ventricular paced rhythm   Rate: 62  Axis: normal  Ectopy: none  Conduction: normal  ST Segments/ T Waves: No ST-T wave changes  Q Waves: none  Comparison to prior: Unchanged    Clinical Impression: normal EKG            Critical Care time:  none             Labs Ordered and Resulted from Time of ED Arrival Up to the Time of Departure from the ED   CBC WITH PLATELETS DIFFERENTIAL - Abnormal; Notable for the following:        Result Value    RBC Count 3.31 (*)     Hemoglobin 9.8 (*)     Hematocrit 30.8 (*)     RDW 20.2 (*)     All other components within normal limits   INR - Abnormal; Notable for the following:     INR 2.54 (*)     All other components within normal limits   BASIC METABOLIC PANEL - Abnormal; Notable for the following:     Chloride 111 (*)     Glucose 131 (*)     Creatinine 2.25 (*)     GFR Estimate 29 (*)     GFR Estimate If Black 35 (*)     All other components within normal limits   ROUTINE UA WITH MICROSCOPIC - Abnormal; Notable for the following:     Blood Urine Large (*)     Protein Albumin Urine 30 (*)     Leukocyte Esterase Urine Trace (*)     Transitional Epi 2 (*)     All other components within normal limits   LACTIC ACID WHOLE BLOOD   LACTATE DEHYDROGENASE   PERIPHERAL IV CATHETER       Consults  Cardiology: Responded (07/20/17 8022)    Assessments & Plan (with Medical Decision Making)   The patient has darker urine likely secondary to mild hemolysis.  His hemoglobin is stable and his Coumadin is therapeutic.  I spoke with Dr. Lemons who stated that he has a known thrombus on his  LVAD he is not a candidate for LVAD replacement and it is not affecting the pump.  He has no symptoms at this time and has good numbers on his pump within normal PI and flow.  The family is comfortable taking the patient home.  He has no symptoms of infection at this time, does not have any signs of heart failure on chest x-ray and Dr. Lemons did not feel that there would be a benefit of admitting him for heparin treatment.    I have reviewed the nursing notes.    I have reviewed the findings, diagnosis, plan and need for follow up with the patient.    Discharge Medication List as of 7/20/2017  4:42 PM          Final diagnoses:   Hematuria       7/20/2017   [facility], [place], EMERGENCY DEPARTMENT     Frank Taylor MD  07/20/17 8111

## 2017-07-20 NOTE — ED NOTES
Bed: ED09  Expected date: 7/20/17  Expected time: 1:01 PM  Means of arrival:   Comments:  67 yr male   Dark urine, weakness  Triaged yellow

## 2017-07-24 NOTE — PROGRESS NOTES
ANTICOAGULATION FOLLOW-UP CLINIC VISIT    Patient Name:  Sohan Rendon  Date:  7/24/2017  Contact Type:  Telephone    SUBJECTIVE:     Patient Findings     Positives No Problem Findings           OBJECTIVE    INR   Date Value Ref Range Status   07/24/2017 2.7  Final     Chromogenic Factor 10   Date Value Ref Range Status   08/10/2014 24 (L) 70 - 130 % Final     Comment:     Therapeutic Range:  A Chromogenic Factor 10 level of approximately 20-40%   inversely correlates with an INR of 2-3 for patients receiving Warfarin.   Chromogenic Factor 10 levels below 20% indicate an INR greater than 3 and   levels above 40% indicate an INR less than 2.       Factor 2 Assay   Date Value Ref Range Status   07/21/2014 25 (L) 60 - 140 % Final     Comment:     Analyte Specific Reagents (ASRs) are used in many laboratory tests necessary   for   standard medical care and generally do not require FDA approval.  This test   was   developed and its performance characteristics determined by [facility].  It has not been cleared or approved by   the US Food and Drug Administration.         ASSESSMENT / PLAN  INR assessment SUPRA    Recheck INR In: 1 WEEK    INR Location [facility]      Anticoagulation Summary as of 7/24/2017     INR goal    Prior goal 1.8-2.5   Today's INR 2.7!   Maintenance plan No maintenance plan   Full instructions 7/24: 3 mg; 7/25: 3 mg; 7/26: 3 mg; 7/27: 3 mg; 7/28: 3 mg; 7/29: 4.5 mg; 7/30: 3 mg   Plan last modified Blanca Bah RN (5/30/2017)   Next INR check 7/31/2017   Priority INR   Target end date Indefinite    Indications   LVAD (left ventricular assist device) present [Z95.811]  Long-term (current) use of anticoagulants [Z79.01] [Z79.01]         Anticoagulation Episode Summary     INR check location     Preferred lab     [facility]    Comments Goal Range is 1.8-2.3  [facility] comes out to draw INR  Daughter Almaz Ph  [phone]      Anticoagulation Care Providers     Provider Role Specialty Phone number    Evon Lemons MD Responsible Cardiology [phone]            See the Encounter Report to view Anticoagulation Flowsheet and Dosing Calendar (Go to Encounters tab in chart review, and find the Anticoagulation Therapy Visit)  Spoke with [facility] nurse.    Dafne Sanders RN

## 2017-07-24 NOTE — MR AVS SNAPSHOT
[facility]   7/24/2017   Anticoagulation Therapy Visit    Description:  67 year old male   Provider:  Dafne Sanders RN   Department:  [facility]           INR as of 7/24/2017     Today's INR 2.7!      Anticoagulation Summary as of 7/24/2017     INR goal    Prior goal 1.8-2.5   Today's INR 2.7!   Full instructions 7/24: 3 mg; 7/25: 3 mg; 7/26: 3 mg; 7/27: 3 mg; 7/28: 3 mg; 7/29: 4.5 mg; 7/30: 3 mg   Next INR check 7/31/2017    Indications   LVAD (left ventricular assist device) present [Z95.811]  Long-term (current) use of anticoagulants [Z79.01] [Z79.01]         July 2017 Details    Sun Mon Tue Wed Thu Fri Sat           1                 2               3               4               5               6               7               8                 9               10               11               12               13               14               15                 16               17               18               19               20               21               22                 23               24      3 mg   See details      25      3 mg         26      3 mg         27      3 mg         28      3 mg         29      4.5 mg           30      3 mg         31                  Date Details   07/24 This INR check       Date of next INR:  7/31/2017         How to take your warfarin dose     To take:  3 mg Take 1 of the 3 mg tablets.    To take:  4.5 mg Take 1.5 of the 3 mg tablets.

## 2017-07-26 NOTE — TELEPHONE ENCOUNTER
Message sent to SHAWN Cowart [facility] giving information regarding referral for nephrology.  Requested for her to assist in making appointment for patient.    Concetta Burr RN

## 2017-07-26 NOTE — TELEPHONE ENCOUNTER
Received message from SHAWN Cowart [facility] stating she spoke with patient's daughter yesterday.  She stated patient has never been to a nephrologist.  Sofya is requesting assistance with a name of someone they could see.    Cued order for referral and routing to provider to complete.    Concetta Burr RN

## 2017-07-28 NOTE — TELEPHONE ENCOUNTER
I know of no group able to see him sooner, though daughter is welcome to call around if she wishes.  Thanks.

## 2017-07-28 NOTE — TELEPHONE ENCOUNTER
Received call from patients daughter - she stated she had called to make an appointment with the nephrologist but they are not able to get an appt until Nov. She is not wanting to wait that long and was wondering if there was someone else they could see that they could get in sooner.     Daughter would like a call back

## 2017-07-31 NOTE — PROGRESS NOTES
ANTICOAGULATION FOLLOW-UP CLINIC VISIT    Patient Name:  Sohan Rendon  Date:  7/31/2017  Contact Type:  Telephone    SUBJECTIVE:     Patient Findings     Positives Unexplained INR or factor level change    Comments Patient is not on ASA.           OBJECTIVE    INR   Date Value Ref Range Status   07/31/2017 1.6  Final     Chromogenic Factor 10   Date Value Ref Range Status   08/10/2014 24 (L) 70 - 130 % Final     Comment:     Therapeutic Range:  A Chromogenic Factor 10 level of approximately 20-40%   inversely correlates with an INR of 2-3 for patients receiving Warfarin.   Chromogenic Factor 10 levels below 20% indicate an INR greater than 3 and   levels above 40% indicate an INR less than 2.       Factor 2 Assay   Date Value Ref Range Status   07/21/2014 25 (L) 60 - 140 % Final     Comment:     Analyte Specific Reagents (ASRs) are used in many laboratory tests necessary   for   standard medical care and generally do not require FDA approval.  This test   was   developed and its performance characteristics determined by [facility].  It has not been cleared or approved by   the US Food and Drug Administration.         ASSESSMENT / PLAN  INR assessment SUB    Recheck INR In: 3 DAYS    INR Location Clinic      Anticoagulation Summary as of 7/31/2017     INR goal    Prior goal 1.8-2.5   Today's INR 1.6!   Maintenance plan No maintenance plan   Full instructions 7/31: 4.5 mg; 8/1: 3 mg; 8/2: 3 mg   Plan last modified Blanca Bah RN (5/30/2017)   Next INR check 8/3/2017   Priority INR   Target end date Indefinite    Indications   LVAD (left ventricular assist device) present [Z95.811]  Long-term (current) use of anticoagulants [Z79.01] [Z79.01]         Anticoagulation Episode Summary     INR check location     Preferred lab     Send [facility] reminders to [facility]    Comments Goal Range is 1.8-2.3  [facility] comes out to draw INR  Daughter Almaz Ph [phone]      Anticoagulation Care Providers     Provider Role Specialty Phone number    Evon Lemons MD Responsible Cardiology [phone]            See the Encounter Report to view Anticoagulation Flowsheet and Dosing Calendar (Go to Encounters tab in chart review, and find the Anticoagulation Therapy Visit)    Spoke with Jonah MCKINNON.    Blanca Bah RN

## 2017-07-31 NOTE — TELEPHONE ENCOUNTER
Please call pt's daughter, Almaz, back. She has concerns about the pt. She states that he is not producing much urine, and that lab work was supposed to be done this week to check his kidneys.    Routing to triage.

## 2017-07-31 NOTE — TELEPHONE ENCOUNTER
Left vmail for Almaz asking for a return call.    Re: kidney function, see part of Dr. Dill's office visit note from 7/18/17:  (N18.3) Chronic kidney disease  Comment: Etiology unclear; pre-renal due to CHF may be the primary cause.  Is on no nephrotoxins.  GFR has demonstrated gradual decline over past 6 months, from around 52 to around 36.  Unclear why this is happening: worsened heart function vs ARB therapy with losartan vs other.  He has no significant metabolic abnormalities due to renal failure, and I doubt his anemia is due to renal disease at GFR 36.  Plan: I'm going to ask for repeat GFR later this week when INR drawn.  If unimproved, will ask Cardiology if trial of losartan dose reduction or elimination is warranted.    Patient was in ED on 7/20/17, which is when he was supposed to have a BMP drawn at home.  He had a BMP drawn in the ED and his GFR was 35 that day.    Routing to Dr. Dill to please advise.

## 2017-07-31 NOTE — MR AVS SNAPSHOT
[facility]   7/31/2017   Anticoagulation Therapy Visit    Description:  67 year old male   Provider:  Blanca Bah, RN   Department:  [facility]           INR as of 7/31/2017     Today's INR 1.6!      Anticoagulation Summary as of 7/31/2017     INR goal    Prior goal 1.8-2.5   Today's INR 1.6!   Full instructions 7/31: 4.5 mg; 8/1: 3 mg; 8/2: 3 mg   Next INR check 8/3/2017    Indications   LVAD (left ventricular assist device) present [Z95.811]  Long-term (current) use of anticoagulants [Z79.01] [Z79.01]         July 2017 Details    Sun Mon Tue Wed Thu Fri Sat           1                 2               3               4               5               6               7               8                 9               10               11               12               13               14               15                 16               17               18               19               20               21               22                 23               24               25               26               27               28               29                 30               31      4.5 mg   See details            Date Details   07/31 This INR check               How to take your warfarin dose     To take:  4.5 mg Take 1.5 of the 3 mg tablets.           August 2017 Details    Sun Mon Tue Wed Thu Fri Sat       1      3 mg         2      3 mg         3            4               5                 6               7               8               9               10               11               12                 13               14               15               16               17               18               19                 20               21               22               23               24               25               26                 27               28               29               30               31                  Date Details   No additional details    Date of next INR:  8/3/2017          How to take your warfarin dose     To take:  3 mg Take 1 of the 3 mg tablets.

## 2017-08-02 NOTE — TELEPHONE ENCOUNTER
Message sent to [facility] to draw lab at next INR visit.    Left voicemail for Almaz, daughter to inform.    Concetta Burr RN

## 2017-08-02 NOTE — TELEPHONE ENCOUNTER
Reason for call:  Other     Additional comments: Pt's daughter, Almaz, states that someone was suppose to come out and draw labs on her dad last week and no one has done this. Would like to know when this is going to happen.      Phone number to reach patient:  Other phone number:  [phone]    Best Time:  anytime    Can we leave a detailed message on this number?  YES       Mariam Hercules  [facility]

## 2017-08-02 NOTE — TELEPHONE ENCOUNTER
Dr. Dill, pt's daughter is calling again about kidney function again.  Hi last BMP was drawn in the ED on 7/20. Please advise plan going forward. Thanks.

## 2017-08-03 NOTE — MR AVS SNAPSHOT
[facility]   8/3/2017   Anticoagulation Therapy Visit    Description:  67 year old male   Provider:  Blanca Bah, RN   Department:  [facility]           INR as of 8/3/2017     Today's INR 1.7!      Anticoagulation Summary as of 8/3/2017     INR goal    Prior goal 1.8-2.5   Today's INR 1.7!   Full instructions 8/3: 4.5 mg; 8/4: 4.5 mg; 8/5: 3 mg; 8/6: 3 mg   Next INR check 8/7/2017    Indications   LVAD (left ventricular assist device) present [Z95.811]  Long-term (current) use of anticoagulants [Z79.01] [Z79.01]         August 2017 Details    Sun Mon Tue Wed Thu Fri Sat       1               2               3      4.5 mg   See details      4      4.5 mg         5      3 mg           6      3 mg         7            8               9               10               11               12                 13               14               15               16               17               18               19                 20               21               22               23               24               25               26                 27               28               29               30               31                  Date Details   08/03 This INR check       Date of next INR:  8/7/2017         How to take your warfarin dose     To take:  3 mg Take 1 of the 3 mg tablets.    To take:  4.5 mg Take 1.5 of the 3 mg tablets.

## 2017-08-03 NOTE — PROGRESS NOTES
ANTICOAGULATION FOLLOW-UP CLINIC VISIT    Patient Name:  Sohan Rendon  Date:  8/3/2017  Contact Type:  Telephone    SUBJECTIVE:     Patient Findings     Positives Unexplained INR or factor level change           OBJECTIVE    INR   Date Value Ref Range Status   08/03/2017 1.7  Final     Chromogenic Factor 10   Date Value Ref Range Status   08/10/2014 24 (L) 70 - 130 % Final     Comment:     Therapeutic Range:  A Chromogenic Factor 10 level of approximately 20-40%   inversely correlates with an INR of 2-3 for patients receiving Warfarin.   Chromogenic Factor 10 levels below 20% indicate an INR greater than 3 and   levels above 40% indicate an INR less than 2.       Factor 2 Assay   Date Value Ref Range Status   07/21/2014 25 (L) 60 - 140 % Final     Comment:     Analyte Specific Reagents (ASRs) are used in many laboratory tests necessary   for   standard medical care and generally do not require FDA approval.  This test   was   developed and its performance characteristics determined by [facility].  It has not been cleared or approved by   the US Food and Drug Administration.         ASSESSMENT / PLAN  INR assessment THER    Recheck INR In: 4 DAYS    INR Location Clinic      Anticoagulation Summary as of 8/3/2017     INR goal    Prior goal 1.8-2.5   Today's INR 1.7!   Maintenance plan No maintenance plan   Full instructions 8/3: 4.5 mg; 8/4: 4.5 mg; 8/5: 3 mg; 8/6: 3 mg   Plan last modified Blanca Bah RN (5/30/2017)   Next INR check 8/7/2017   Priority INR   Target end date Indefinite    Indications   LVAD (left ventricular assist device) present [Z95.811]  Long-term (current) use of anticoagulants [Z79.01] [Z79.01]         Anticoagulation Episode Summary     INR check location     Preferred lab     [facility]    Comments Goal Range is 1.8-2.3  [facility] comes out to draw INR  Daughter Almaz Ph [phone]       Anticoagulation Care Providers     Provider Role Specialty Phone number    Evon Lemons MD Responsible Cardiology [phone]            See the Encounter Report to view Anticoagulation Flowsheet and Dosing Calendar (Go to Encounters tab in chart review, and find the Anticoagulation Therapy Visit)    Spoke with Sofya MCKINNON.    Blanca Bah RN

## 2017-08-03 NOTE — TELEPHONE ENCOUNTER
Pt's PCP is on vacation. Covering providing is requesting clarification on what labs are needed for. VM left for Vivi, pt's VAD coordinator. Ordinairly the specialist requesting labs puts the orders in so the orders will go to the ordering provider. We can have our lab team go out to pt's home and draw them.    Routing message to pt's VAD coordinator for clarification.    Mary Jimenez MA  [facility]

## 2017-08-03 NOTE — TELEPHONE ENCOUNTER
Pt's cardiovascular clinic requesting we send out our in-home lab team to draw labs on pt ASAP. Orders have been pended. Please sign.    Mary Jimenez MA  [facility]

## 2017-08-04 NOTE — TELEPHONE ENCOUNTER
Spoke with son, Teddy, who said the pt has had generalized abdominal pain for the past day.  It is more painful when he takes a deep breath and is tender to the touch.    The pt's son denied fever, constipation (last BM was 8/2) diarrhea, nausea and vomiting. The patient was sleeping during our conversations, so unable to get full assessment over the phone.  The family worries because pt reports his abdominal symptoms feel the same as before he had a biliary obstruction in May 2017.    Son said the patient is going to a couple appts on 8/9 and he wonders if he could have an abdominal U/S while he out and about.    Advised I would route to covering providers to please advise and will call him back with a response.    Ekaterina Nielson RN

## 2017-08-04 NOTE — TELEPHONE ENCOUNTER
I signed the lab orders.  As far as I can tell the lab needed is BMP.  I don't see any indication for the A1C.  No diabetes by history.  Jacquelin Santamaria [facility]

## 2017-08-04 NOTE — TELEPHONE ENCOUNTER
Teddy called in regards to his father. He stated he is having some of the same issues as he was having before with his gullbladder and would like a scan. I informed him that we can't just order it without permission from the PCP and that I would pass this message along.     Fwd to RN

## 2017-08-04 NOTE — MR AVS SNAPSHOT
[facility]   8/4/2017   Anticoagulation Therapy Visit    Description:  67 year old male   Provider:  Sarah Osborne, RN   Department:  [facility]           INR as of 8/4/2017     Today's INR       Anticoagulation Summary as of 8/4/2017     INR goal    Prior goal 1.8-2.5   Today's INR    Next INR check     Indications   LVAD (left ventricular assist device) present [Z95.811]  Long-term (current) use of anticoagulants [Z79.01] [Z79.01]         August 2017 Details    Sun Mon Tue Wed Thu Fri Sat       1               2               3      4.5 mg   See details      4      4.5 mg         5      3 mg           6      3 mg         7            8               9               10               11               12                 13               14               15               16               17               18               19                 20               21               22               23               24               25               26                 27               28               29               30               31                  Date Details   08/03 This INR check       Date of next INR:  8/7/2017         How to take your warfarin dose     To take:  3 mg Take 1 of the 3 mg tablets.    To take:  4.5 mg Take 1.5 of the 3 mg tablets.

## 2017-08-04 NOTE — TELEPHONE ENCOUNTER
I believe that this can be deferred to PCP who will be back on Monday.  Does not need to be addressed today.  Family should call back this weekend if symptoms worsen.  Jacquelin Santamaria [facility]

## 2017-08-04 NOTE — TELEPHONE ENCOUNTER
Called pt's son back and left vmail informing him that we will send their request to Dr. Dill to address on Monday.    Also advised to contact [facility] or  Home Care over the weekend with worsening symptoms.

## 2017-08-07 PROBLEM — R10.84 ABDOMINAL PAIN, GENERALIZED: Status: ACTIVE | Noted: 2017-05-03

## 2017-08-07 NOTE — TELEPHONE ENCOUNTER
In-home labs will draw pt today per client services.    Mary Jimenez MA  [facility]

## 2017-08-07 NOTE — PROGRESS NOTES
ANTICOAGULATION FOLLOW-UP CLINIC VISIT    Patient Name:  Sohan Rendon  Date:  8/7/2017  Contact Type:  Telephone    SUBJECTIVE:     Patient Findings     Positives Change in medications, OTC meds    Comments Losartan decreased and has been taking Tylenol for abdominal pain (4 doses Saturday and 2 doses Sunday), and also fasted for labs today due to testing for pancreatitis            OBJECTIVE    INR   Date Value Ref Range Status   08/07/2017 2.60  Final     Comment:     Home Care      Chromogenic Factor 10   Date Value Ref Range Status   08/10/2014 24 (L) 70 - 130 % Final     Comment:     Therapeutic Range:  A Chromogenic Factor 10 level of approximately 20-40%   inversely correlates with an INR of 2-3 for patients receiving Warfarin.   Chromogenic Factor 10 levels below 20% indicate an INR greater than 3 and   levels above 40% indicate an INR less than 2.       Factor 2 Assay   Date Value Ref Range Status   07/21/2014 25 (L) 60 - 140 % Final     Comment:     Analyte Specific Reagents (ASRs) are used in many laboratory tests necessary   for   standard medical care and generally do not require FDA approval.  This test   was   developed and its performance characteristics determined by [facility].  It has not been cleared or approved by   the US Food and Drug Administration.         ASSESSMENT / PLAN  INR assessment SUPRA    Recheck INR In: 2 DAYS    INR Location Homecare INR      Anticoagulation Summary as of 8/7/2017     INR goal    Prior goal 1.8-2.5   Today's INR 2.60!   Maintenance plan No maintenance plan   Full instructions 8/7: 3 mg; 8/8: 3 mg   Plan last modified Blanca Bah RN (5/30/2017)   Next INR check 8/9/2017   Priority INR   Target end date Indefinite    Indications   LVAD (left ventricular assist device) present [Z95.811]  Long-term (current) use of anticoagulants [Z79.01] [Z79.01]         Anticoagulation Episode Summary     INR check location      Preferred lab     Send INR reminders to [facility]    Comments Goal Range is 1.8-2.3  [facility] comes out to draw INR  Sofya Ph [phone]  Daughter Almaz Ph [phone]      Anticoagulation Care Providers     Provider Role Specialty Phone number    Evon Lemons MD Responsible Cardiology [phone]            See the Encounter Report to view Anticoagulation Flowsheet and Dosing Calendar (Go to Encounters tab in chart review, and find the Anticoagulation Therapy Visit)    Spoke with JONNIE Cowart. Gave them new warfarin recommendation.  No changes in health, medication, or diet. No missed doses, no falls. No signs or symptoms of bleed or clotting.     PLEASE CALL JONNIE COWART with results and dosing from Wednesday. Also to let Sofya know when to recheck INR    Sarah Martinez RN

## 2017-08-07 NOTE — MR AVS SNAPSHOT
[facility]   8/7/2017   Anticoagulation Therapy Visit    Description:  67 year old male   Provider:  Sarah Martinez, RN   [facility]  [facility]           INR as of 8/7/2017     Today's INR 2.60!      Anticoagulation Summary as of 8/7/2017     INR goal    Prior goal 1.8-2.5   Today's INR 2.60!   Full instructions 8/7: 3 mg; 8/8: 3 mg   Next INR check 8/9/2017    Indications   LVAD (left ventricular assist device) present [Z95.811]  Long-term (current) use of anticoagulants [Z79.01] [Z79.01]         August 2017 Details    Sun Mon Tue Wed Thu Fri Sat       1               2               3               4               5                 6               7      3 mg   See details      8      3 mg         9            10               11               12                 13               14               15               16               17               18               19                 20               21               22               23               24               25               26                 27               28               29               30               31                  Date Details   08/07 This INR check       Date of next INR:  8/9/2017         How to take your warfarin dose     To take:  3 mg Take 1 of the 3 mg tablets.

## 2017-08-07 NOTE — TELEPHONE ENCOUNTER
[facility] is able to perform U/S tomorrow. Pt is to be NPO for 8 hours. [facility] will call the dtr to let her know they will be out tomorrow at 9 am.

## 2017-08-07 NOTE — TELEPHONE ENCOUNTER
Message sent to [facility] to draw labs today if possible as ordered by provider.    Concetta Burr RN

## 2017-08-08 NOTE — TELEPHONE ENCOUNTER
US abdomen, complete    IMPRESSION:    1. Shadowing gallstone is present.  2. Few renal cysts.  3. Left renal cyst, largest measuring up to 5.5 cm.  4. Increased echogenicity of the liver suggestive of mild fatty infiltration.

## 2017-08-09 NOTE — TELEPHONE ENCOUNTER
Left voicemail for Almaz, daughter requesting return call to explain US results.    Sent message to SHAWN Cowart [facility] with information as noted by provider.    Concetta Burr RN

## 2017-08-09 NOTE — PROGRESS NOTES
ANTICOAGULATION FOLLOW-UP CLINIC VISIT    Patient Name:  Sohan Rendon  Date:  8/9/2017  Contact Type:  Telephone    SUBJECTIVE:        OBJECTIVE    INR   Date Value Ref Range Status   08/09/2017 2.67 (H) 0.86 - 1.14 Final     Chromogenic Factor 10   Date Value Ref Range Status   08/10/2014 24 (L) 70 - 130 % Final     Comment:     Therapeutic Range:  A Chromogenic Factor 10 level of approximately 20-40%   inversely correlates with an INR of 2-3 for patients receiving Warfarin.   Chromogenic Factor 10 levels below 20% indicate an INR greater than 3 and   levels above 40% indicate an INR less than 2.       Factor 2 Assay   Date Value Ref Range Status   07/21/2014 25 (L) 60 - 140 % Final     Comment:     Analyte Specific Reagents (ASRs) are used in many laboratory tests necessary   for   standard medical care and generally do not require FDA approval.  This test   was   developed and its performance characteristics determined by [facility].  It has not been cleared or approved by   the US Food and Drug Administration.         ASSESSMENT / PLAN  INR assessment SUPRA    Recheck INR In: 5 DAYS    INR Location Clinic      Anticoagulation Summary as of 8/9/2017     INR goal    Prior goal 1.8-2.5   Today's INR 2.67!   Maintenance plan No maintenance plan   Full instructions 8/9: 1.5 mg; 8/10: 3 mg; 8/11: 4.5 mg; 8/12: 3 mg; 8/13: 3 mg   Plan last modified Blanca Bah RN (5/30/2017)   Next INR check 8/14/2017   Priority INR   Target end date Indefinite    Indications   LVAD (left ventricular assist device) present [Z95.811]  Long-term (current) use of anticoagulants [Z79.01] [Z79.01]         Anticoagulation Episode Summary     INR check location     Preferred lab     [facility]    Comments Goal Range is 1.8-2.3  [facility] comes out to draw INR  Sofya Ph [phone]  Daughter Almaz Ph [phone]      Anticoagulation Care  Providers     Provider Role Specialty Phone number    Evon Lemons MD Responsible Cardiology [phone]            See the Encounter Report to view Anticoagulation Flowsheet and Dosing Calendar (Go to Encounters tab in chart review, and find the Anticoagulation Therapy Visit)    Spoke with Sofya MCKINNON and message left for Almaz.    Blanca Bah RN

## 2017-08-09 NOTE — NURSING NOTE
Chief Complaint   Patient presents with     Follow Up For     VAD f/u     Vitals were taken and medications were reconciled.     Dylan Murphy, YESENIA  10:22 AM

## 2017-08-09 NOTE — TELEPHONE ENCOUNTER
Spoke with Almaz, daughter and given information as noted by provider.  She would still like to move forward and have CT scan completed now as well.    Routing to provider to review.    Concetta Burr RN

## 2017-08-09 NOTE — PROGRESS NOTES
HPI:   Mr. Rendon is a 67 year old male with a past medical history including ICM s/p HM II LVAD as DT complicated by pump thrombosis, recurrent hemolysis, and multiple CVA's, CKD, CDiff, HTN, hyperlipidemia, and latent TB. He had recent hospitalization at [facility] from 6/29/17 to 7/2/17 for UTI sepsis with non lactose fermenting gram negative rods. Presents to clinic for follow-up.     He reports new RUQ abdominal pain since last visit - present x2 weeks. Described as sharp, worse with deep breaths or coughing, pain is a little better after changing diet. Denies constipation,  N/V/D, blood in stool. Patient does report abdominal bloating that feels like fluid. Had US per PCP which was unrevealing. Plan is for non con CT. Also reports dysuria today, no frequency or hematuria, no fever or chills. Patient denies lightheadedness, dizziness, changes in speech, chest pain, SOB, HOOK, PND, cough, LE edema. He denies any concerns at his LVAD drive line site. Family does not weigh patient at home. He continues to maintain a low sodium diet but mostly eating soup now due to stomach pains.       PAST MEDICAL HISTORY:  Past Medical History:   Diagnosis Date     Acute right MCA stroke (H) 6/2013    With L sided hemiparesis      Anemia of chronic disease     Baseline Hb 8-9     BPH (benign prostatic hyperplasia)      CHF (congestive heart failure), NYHA class IV (H) 10/9/2012    s/p HeartMate II.  Was on milrinone and dobutamine prior to LVAD      CKD (chronic kidney disease)     Baseline Cr=     Clostridium difficile colitis 12/2012      Dysphagia     PEG tube placed in 2012.  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  Normal carotid duplex 3/2014.       Esophagitis 12/2012    EGD with esophagitis and multiple douenal ulcers     Fracture of femoral neck, right, closed (H) 2/3/2015    Presumed pathologic as patient is non-weight-bearing  and suffered no trauma.  Family declined operative repair during hospital stay 1/23 - 1/30/15.     Gastric and duodenal angiodysplasia with hemorrhage 6/18/2015     GERD (gastroesophageal reflux disease)      Gout      HTN (hypertension)     LDL=59 (3/29/2014)     Hyperlipaemia      Ischemic cardiomyopathy      Mitral regurgitation     s/p MVR with Carbomedics ring      Myocardial infarction (H) 1998    In [facility] without intervention      Nonsenile cataract     BE     PFO (patent foramen ovale)     s/p closure (10/9/2012)     TB lung, latent     s/p 9 months INH in 2012        FAMILY HISTORY:  Family History   Problem Relation Age of Onset     Family History Negative No family hx of      Glaucoma No family hx of      Macular Degeneration No family hx of      CANCER No family hx of      no skin cancer       SOCIAL HISTORY:  Social History     Social History     Marital status:      Spouse name: N/A     Number of children: N/A     Years of education: N/A     Social History Main Topics     Smoking status: Former Smoker     Smokeless tobacco: Former User     Alcohol use No     Drug use: No     Sexual activity: Not on file     Other Topics Concern     Not on file     Social History Narrative       CURRENT MEDICATIONS:    Current Outpatient Prescriptions on File Prior to Visit:  losartan (COZAAR) 25 MG tablet Take 0.5 tablets (12.5 mg) by mouth daily   levETIRAcetam (KEPPRA) 750 MG tablet TAKE 1 TABLET (750 MG) BY MOUTH 2 TIMES DAILY   tamsulosin (FLOMAX) 0.4 MG capsule TAKE 1 CAPSULE EVERY DAY   metoprolol (TOPROL-XL) 50 MG 24 hr tablet TAKE 1 TABLET (50 MG) BY MOUTH DAILY   metoprolol (LOPRESSOR) 25 MG tablet Take 50 mg (2 tablets) in the morning and 25 mg (1 tablet) in the evening.   furosemide (LASIX) 20 MG tablet Take 1 tablet (20 mg) by mouth as needed   ALEK CONTOUR test strip USE TO TEST BLOOD SUGAR 2 TIMES DAILY OR AS DIRECTED.   warfarin (COUMADIN) 3 MG tablet Take 3mgtonight and tomorrow night. Follow up  with coumadin clinic on Tuesday for further dosing   ketotifen (ZADITOR/REFRESH ANTI-ITCH) 0.025 % SOLN ophthalmic solution Place 1 drop into both eyes 2 times daily   ASPIRIN NOT PRESCRIBED (INTENTIONAL) Please choose reason not prescribed, below   SENEXON-S 8.6-50 MG per tablet TAKE 1-2 TABLETS BY MOUTH 2 TIMES DAILY AS NEEDED FOR CONSTIPATION   metoprolol (TOPROL-XL) 25 MG 24 hr tablet TAKE 1 TABLET (25 MG) BY MOUTH EVERY EVENING   bisacodyl (DULCOLAX) 10 MG Suppository Place 1 suppository (10 mg) rectally daily as needed for constipation   magnesium oxide (MAG-OX) 400 MG tablet Take 1 tablet (400 mg) by mouth 2 times daily   finasteride (PROSCAR) 5 MG tablet Take 1 tablet (5 mg) by mouth daily   atorvastatin (LIPITOR) 10 MG tablet TAKE 1 TABLET (10 MG) BY MOUTH DAILY   calcium carbonate-vitamin D 500-400 MG-UNIT TABS per tablet Take 1 tablet by mouth daily   simethicone (MYLICON) 80 MG chewable tablet Take 1 tablet (80 mg) by mouth 4 times daily   oxyCODONE (ROXICODONE) 5 MG IR tablet Take 1 tablet (5 mg) by mouth every 4 hours as needed for moderate to severe pain   melatonin 3 MG tablet Take 1 tablet (3 mg) by mouth At Bedtime   Thiamine HCl (VITAMIN B-1) 100 MG TABS TAKE 1 TABLET (100 MG) BY MOUTH DAILY   order for DME Equipment being ordered: Cushioned heel boots.   allopurinol (ZYLOPRIM) 100 MG tablet Take 1 tablet (100 mg) by mouth daily   order for DME Equipment being ordered: Wheelchair, manual, with elevated leg rests and tilt in space back.  Please fax to [facility]; I called them 11/26/16 and they requested the new order.  Face to face is in my 10/26/16 note.   pantoprazole (PROTONIX) 40 MG enteric coated tablet Take 1 tablet (40 mg) by mouth daily   order for DME Equipment being ordered: Wheelchair, manual.   order for DME Equipment being ordered: Hospital Bed, fully electric.   loratadine (CLARITIN) 10 MG tablet Take 1 tablet (10 mg) by mouth daily   order for DME Equipment being ordered: Reclining  "manual w/c with bilateral elevating leg rests.   nystatin (MYCOSTATIN) 201560 UNIT/GM POWD Apply to affected areas of skin in the groin and on the scrotum three times a day as needed.   order for DME Equipment being ordered: Bilateral leg supports for manual wheelchair.   nitroglycerin (NITROSTAT) 0.4 MG SL tablet Place 1 tablet (0.4 mg) under the tongue every 5 minutes as needed for chest pain   blood glucose monitoring (NO BRAND SPECIFIED) lancets Use to test blood sugars 2 times daily or as directed.   ORDER FOR DME Equipment being ordered: Lift chair.Please see indications and face-to-face encounter details in 2/3/15 Office Visit note.   acetaminophen (TYLENOL) 325 MG tablet Take 2 tablets (650 mg) by mouth every 6 hours (Patient taking differently: Take 650 mg by mouth as needed )   bisacodyl (DULCOLAX) 5 MG EC tablet Take 1 tablet (5 mg) by mouth daily as needed for constipation     No current facility-administered medications on file prior to visit.     ROS:   CONSTITUTIONAL: Denies fever, chills, fatigue, or weight fluctuations.   HEENT: Denies headache, vision changes, and changes in speech.   CV: Refer to HPI.   PULMONARY:Denies shortness of breath, cough.  GI: Refer to HPI.   :Refer to HPI.  EXT:Denies lower extremity edema.   SKIN:Denies abnormal rashes or lesions.   MUSCULOSKELETAL:Denies upper or lower extremity weakness and pain.   NEUROLOGIC:Denies lightheadedness, dizziness, seizures, or upper or lower extremity paresthesia.     EXAM:  BP (!) 78/0 (BP Location: Left arm, Patient Position: Supine, Cuff Size: Adult Regular)  Ht 1.727 m (5' 8\")  Wt 82.6 kg (182 lb)  BMI 27.67 kg/m2     GENERAL: Appears comfortable, chronically ill, in no distress. Examined on stretcher.   HEENT: Eye symmetrical and without discharge or icterus bilaterally. Mucous membranes moist and without lesions.  NECK: Supple, JVD difficult to appreciate, appears elevated.   CV: +mechanical LVAD hum  RESPIRATORY: Respirations " regular, even, and unlabored. Lungs CTA anteriorly.   GI: Slightly firm and distended with normoactive bowel sounds present in all quadrants. No tenderness, rebound, guarding. No organomegaly.   EXTREMITIES: No peripheral edema. Non pulsatile.   NEUROLOGIC: Alert and orientated x 3.  No focal deficits.   MUSCULOSKELETAL: No joint swelling or tenderness.   SKIN: No jaundice. No rashes or lesions. Driveline dressing cdi.    Labs:  CBC RESULTS:  Lab Results   Component Value Date    WBC 7.1 08/09/2017    RBC 2.82 (L) 08/09/2017    HGB 8.5 (L) 08/09/2017    HCT 26.6 (L) 08/09/2017    MCV 94 08/09/2017    MCH 30.1 08/09/2017    MCHC 32.0 08/09/2017    RDW 18.9 (H) 08/09/2017     08/09/2017       CMP RESULTS:  Lab Results   Component Value Date     08/09/2017    POTASSIUM 3.7 08/09/2017    CHLORIDE 111 (H) 08/09/2017    CO2 20 08/09/2017    ANIONGAP 9 08/09/2017     (H) 08/09/2017    BUN 19 08/09/2017    CR 2.12 (H) 08/09/2017    GFRESTIMATED 31 (L) 08/09/2017    GFRESTBLACK 38 (L) 08/09/2017    JOSÉ 8.6 08/09/2017    BILITOTAL 0.8 08/09/2017    ALBUMIN 3.1 (L) 08/09/2017    ALKPHOS 135 08/09/2017    ALT 30 08/09/2017    AST 57 (H) 08/09/2017        INR RESULTS:  Lab Results   Component Value Date    INR 2.67 (H) 08/09/2017       Lab Results   Component Value Date    MAG 1.8 06/29/2017     Lab Results   Component Value Date    NTBNPI 508 05/27/2014     Lab Results   Component Value Date    NTBNP 1687 (H) 11/11/2015       Most Recent Echocardiogram 8/9/17:  Formal report pending.      Assessment and Plan:   Mr. Rendon is a pleasant 67 year old male with chronic systolic heart failure s/p HM II DTLVAD and multiple CVAs felt to be embolic from LVAD. He is stable from a cardiac standpoint but slightly volume-up. He will take three days of lasix then back to prn. UA ordered for dysuria, will route to PCP.     1. Chronic systolic heart failure secondary to ICM s/p HM II LVAD as DT  Stage D. NYHA Class unable to  assess due to functional limitations from CVA.  Fluid status: JVP difficult to see, unable to track weights, +abd bloating, Cr up since May, recommend three days of lasix 20 mg (currently using prn per daughter)  ACEi/ARB losartan 12.5 mg daily  BB taking 50 mg in AM and 25 mg, unclear if succinate or tartrate, will call daughter when home to clarify    Aldosterone antagonist deferred while other medical therapy is prioritized  SCD prophylaxis Med[employer] ICD      2.  S/p LVAD HM II as DT   VAD Interrogation on 8/9/2017  VAD interrogation reviewed with VAD coordinator. Agree with findings. Several PI events per baseline, no power spikes, speed drops, or other findings suspicious of pump malfunction noted. Driveline site CDI without drainage.   MAPS: 78 today.  Controlled.    LDH trends: 990-1440 since May, LDH 1412 today (1059 7/2)  Anticoagulation: INR goal 1.8-2.3, INR 2.7 today, managed by [facility]  Antiplatelet: deferred due to gross hematuria, address with primary cardiologist next visit    3.  CAD:  Continue beta blocker and atorvastatin.  ASA deferred secondary to past history of gross hematuria.    4.  CKD   Creatinine today is 2.12 today.  (2.24 on 8/4).  Creatinine trends since May 1.45-2.24.  Appears volume up today.  - diuresis as above     5.  RUQ abdominal pain  - will do UA given dysuria, hx UTI  - Primary MD managing workup.  Further eval per primary MD.    6.  Hx of embolic CVA:    Residual left sided hemiparesis. Symptoms stable.  Continue Statin.  Antiplatelets deferred secondary to gross hematuria history.    7.   Hyperlipidemia   At goal.  Last FLP 6/17, LDL 80.  - Continue atorvastatin 10 mg daily     8.  Chronic UTI with recent urosepsis    Most recently 7/2. Finished course of cefdinir. Reports new dysuria today.   - Obtain UA.  Consider suppressive abx with chronic infections, but will defer to PCP who is managing.     9.  Follow up:  [facility] in 1 mos and Dr. Lemons in 3 mos. Follow up with   Fuentes in complex care clinic as outlined for management of multiple medical co-morbidities.         Kelly Shepard, ANISHA, NP-C  8/9/2017    I have evaluated and reviewed all pertinent lab values, vital signs, and medical records.  I have performed a physical exam and agree with the above assessment and recommendations.  This note reflects are joint plan.    Vicky RANGEL, CNP  [phone]      CC  ADAM CLEMENTE

## 2017-08-09 NOTE — TELEPHONE ENCOUNTER
This is the problem with PPX studies: the interpretations are generally terse and lack any detail.  However, I think we can infer that there is no evidence of biliary obstruction based upon these U/S results, and recent labs showed only mild/chronic elevation of AST but did not suggest biliary disease.    Therefore: no explanation on labs and U/S to explain patient's abdominal pain.  Request RN call for symptom update.  If he is still having pain, we can send him for abdominal CT, though it will be a very limited study as we can't use contrast at current GFR.

## 2017-08-09 NOTE — LETTER
8/9/2017      RE: Sohan Rendon  [address]   [facility] [address]       Dear Colleague,    Thank you for the opportunity to participate in the care of your patient, Sohan Rendon, at the [facility] HEART [facility]. Please see a copy of my visit note below.    HPI:   Mr. Rendon is a 67 year old male with a past medical history including ICM s/p HM II LVAD as DT complicated by pump thrombosis, recurrent hemolysis, and multiple CVA's, CKD, CDiff, HTN, hyperlipidemia, and latent TB. He had recent hospitalization at [facility] from 6/29/17 to 7/2/17 for UTI sepsis with non lactose fermenting gram negative rods. Presents to clinic for follow-up.     He reports new RUQ abdominal pain since last visit - present x2 weeks. Described as sharp, worse with deep breaths or coughing, pain is a little better after changing diet. Denies constipation,  N/V/D, blood in stool. Patient does report abdominal bloating that feels like fluid. Had US per PCP which was unrevealing. Plan is for non con CT. Also reports dysuria today, no frequency or hematuria, no fever or chills. Patient denies lightheadedness, dizziness, changes in speech, chest pain, SOB, HOOK, PND, cough, LE edema. He denies any concerns at his LVAD drive line site. Family does not weigh patient at home. He continues to maintain a low sodium diet but mostly eating soup now due to stomach pains.       PAST MEDICAL HISTORY:  Past Medical History:   Diagnosis Date     Acute right MCA stroke (H) 6/2013    With L sided hemiparesis      Anemia of chronic disease     Baseline Hb 8-9     BPH (benign prostatic hyperplasia)      CHF (congestive heart failure), NYHA class IV (H) 10/9/2012    s/p HeartMate II.  Was on milrinone and dobutamine prior to LVAD      CKD (chronic kidney disease)     Baseline Cr=     Clostridium difficile colitis 12/2012      Dysphagia     PEG tube placed in 2012.  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  Normal carotid duplex 3/2014.       Esophagitis 12/2012    EGD with esophagitis and multiple douenal ulcers     Fracture of femoral neck, right, closed (H) 2/3/2015    Presumed pathologic as patient is non-weight-bearing and suffered no trauma.  Family declined operative repair during hospital stay 1/23 - 1/30/15.     Gastric and duodenal angiodysplasia with hemorrhage 6/18/2015     GERD (gastroesophageal reflux disease)      Gout      HTN (hypertension)     LDL=59 (3/29/2014)     Hyperlipaemia      Ischemic cardiomyopathy      Mitral regurgitation     s/p MVR with Carbomedics ring      Myocardial infarction (H) 1998    In [facility] without intervention      Nonsenile cataract     BE     PFO (patent foramen ovale)     s/p closure (10/9/2012)     TB lung, latent     s/p 9 months INH in 2012        FAMILY HISTORY:  Family History   Problem Relation Age of Onset     Family History Negative No family hx of      Glaucoma No family hx of      Macular Degeneration No family hx of      CANCER No family hx of      no skin cancer       SOCIAL HISTORY:  Social History     Social History     Marital status:      Spouse name: N/A     Number of children: N/A     Years of education: N/A     Social History Main Topics     Smoking status: Former Smoker     Smokeless tobacco: Former User     Alcohol use No     Drug use: No     Sexual activity: Not on file     Other Topics Concern     Not on file     Social History Narrative       CURRENT MEDICATIONS:    Current Outpatient Prescriptions on File Prior to Visit:  losartan (COZAAR) 25 MG tablet Take 0.5 tablets (12.5 mg) by mouth daily   levETIRAcetam (KEPPRA) 750 MG tablet TAKE 1 TABLET (750 MG) BY MOUTH 2 TIMES DAILY   tamsulosin (FLOMAX) 0.4 MG capsule TAKE 1 CAPSULE EVERY DAY   metoprolol (TOPROL-XL) 50 MG 24 hr tablet TAKE 1 TABLET (50 MG) BY MOUTH DAILY   metoprolol (LOPRESSOR) 25  MG tablet Take 50 mg (2 tablets) in the morning and 25 mg (1 tablet) in the evening.   furosemide (LASIX) 20 MG tablet Take 1 tablet (20 mg) by mouth as needed   ALEK CONTOUR test strip USE TO TEST BLOOD SUGAR 2 TIMES DAILY OR AS DIRECTED.   warfarin (COUMADIN) 3 MG tablet Take 3mgtonight and tomorrow night. Follow up with coumadin clinic on Tuesday for further dosing   ketotifen (ZADITOR/REFRESH ANTI-ITCH) 0.025 % SOLN ophthalmic solution Place 1 drop into both eyes 2 times daily   ASPIRIN NOT PRESCRIBED (INTENTIONAL) Please choose reason not prescribed, below   SENEXON-S 8.6-50 MG per tablet TAKE 1-2 TABLETS BY MOUTH 2 TIMES DAILY AS NEEDED FOR CONSTIPATION   metoprolol (TOPROL-XL) 25 MG 24 hr tablet TAKE 1 TABLET (25 MG) BY MOUTH EVERY EVENING   bisacodyl (DULCOLAX) 10 MG Suppository Place 1 suppository (10 mg) rectally daily as needed for constipation   magnesium oxide (MAG-OX) 400 MG tablet Take 1 tablet (400 mg) by mouth 2 times daily   finasteride (PROSCAR) 5 MG tablet Take 1 tablet (5 mg) by mouth daily   atorvastatin (LIPITOR) 10 MG tablet TAKE 1 TABLET (10 MG) BY MOUTH DAILY   calcium carbonate-vitamin D 500-400 MG-UNIT TABS per tablet Take 1 tablet by mouth daily   simethicone (MYLICON) 80 MG chewable tablet Take 1 tablet (80 mg) by mouth 4 times daily   oxyCODONE (ROXICODONE) 5 MG IR tablet Take 1 tablet (5 mg) by mouth every 4 hours as needed for moderate to severe pain   melatonin 3 MG tablet Take 1 tablet (3 mg) by mouth At Bedtime   Thiamine HCl (VITAMIN B-1) 100 MG TABS TAKE 1 TABLET (100 MG) BY MOUTH DAILY   order for DME Equipment being ordered: Cushioned heel boots.   allopurinol (ZYLOPRIM) 100 MG tablet Take 1 tablet (100 mg) by mouth daily   order for DME Equipment being ordered: Wheelchair, manual, with elevated leg rests and tilt in space back.  Please fax to [facility]; I called them 11/26/16 and they requested the new order.  Face to face is in my 10/26/16 note.   pantoprazole (PROTONIX) 40  "MG enteric coated tablet Take 1 tablet (40 mg) by mouth daily   order for DME Equipment being ordered: Wheelchair, manual.   order for DME Equipment being ordered: Hospital Bed, fully electric.   loratadine (CLARITIN) 10 MG tablet Take 1 tablet (10 mg) by mouth daily   order for DME Equipment being ordered: Reclining manual w/c with bilateral elevating leg rests.   nystatin (MYCOSTATIN) 272198 UNIT/GM POWD Apply to affected areas of skin in the groin and on the scrotum three times a day as needed.   order for DME Equipment being ordered: Bilateral leg supports for manual wheelchair.   nitroglycerin (NITROSTAT) 0.4 MG SL tablet Place 1 tablet (0.4 mg) under the tongue every 5 minutes as needed for chest pain   blood glucose monitoring (NO BRAND SPECIFIED) lancets Use to test blood sugars 2 times daily or as directed.   ORDER FOR DME Equipment being ordered: Lift chair.Please see indications and face-to-face encounter details in 2/3/15 Office Visit note.   acetaminophen (TYLENOL) 325 MG tablet Take 2 tablets (650 mg) by mouth every 6 hours (Patient taking differently: Take 650 mg by mouth as needed )   bisacodyl (DULCOLAX) 5 MG EC tablet Take 1 tablet (5 mg) by mouth daily as needed for constipation     No current facility-administered medications on file prior to visit.     ROS:   CONSTITUTIONAL: Denies fever, chills, fatigue, or weight fluctuations.   HEENT: Denies headache, vision changes, and changes in speech.   CV: Refer to HPI.   PULMONARY:Denies shortness of breath, cough.  GI: Refer to HPI.   :Refer to HPI.  EXT:Denies lower extremity edema.   SKIN:Denies abnormal rashes or lesions.   MUSCULOSKELETAL:Denies upper or lower extremity weakness and pain.   NEUROLOGIC:Denies lightheadedness, dizziness, seizures, or upper or lower extremity paresthesia.     EXAM:  BP (!) 78/0 (BP Location: Left arm, Patient Position: Supine, Cuff Size: Adult Regular)  Ht 1.727 m (5' 8\")  Wt 82.6 kg (182 lb)  BMI 27.67 kg/m2 "     GENERAL: Appears comfortable, chronically ill, in no distress. Examined on stretcher.   HEENT: Eye symmetrical and without discharge or icterus bilaterally. Mucous membranes moist and without lesions.  NECK: Supple, JVD difficult to appreciate, appears elevated.   CV: +mechanical LVAD hum  RESPIRATORY: Respirations regular, even, and unlabored. Lungs CTA anteriorly.   GI: Slightly firm and distended with normoactive bowel sounds present in all quadrants. No tenderness, rebound, guarding. No organomegaly.   EXTREMITIES: No peripheral edema. Non pulsatile.   NEUROLOGIC: Alert and orientated x 3.  No focal deficits.   MUSCULOSKELETAL: No joint swelling or tenderness.   SKIN: No jaundice. No rashes or lesions. Driveline dressing cdi.    Labs:  CBC RESULTS:  Lab Results   Component Value Date    WBC 7.1 08/09/2017    RBC 2.82 (L) 08/09/2017    HGB 8.5 (L) 08/09/2017    HCT 26.6 (L) 08/09/2017    MCV 94 08/09/2017    MCH 30.1 08/09/2017    MCHC 32.0 08/09/2017    RDW 18.9 (H) 08/09/2017     08/09/2017       CMP RESULTS:  Lab Results   Component Value Date     08/09/2017    POTASSIUM 3.7 08/09/2017    CHLORIDE 111 (H) 08/09/2017    CO2 20 08/09/2017    ANIONGAP 9 08/09/2017     (H) 08/09/2017    BUN 19 08/09/2017    CR 2.12 (H) 08/09/2017    GFRESTIMATED 31 (L) 08/09/2017    GFRESTBLACK 38 (L) 08/09/2017    JOSÉ 8.6 08/09/2017    BILITOTAL 0.8 08/09/2017    ALBUMIN 3.1 (L) 08/09/2017    ALKPHOS 135 08/09/2017    ALT 30 08/09/2017    AST 57 (H) 08/09/2017        INR RESULTS:  Lab Results   Component Value Date    INR 2.67 (H) 08/09/2017       Lab Results   Component Value Date    MAG 1.8 06/29/2017     Lab Results   Component Value Date    NTBNPI 508 05/27/2014     Lab Results   Component Value Date    NTBNP 1687 (H) 11/11/2015       Most Recent Echocardiogram 8/9/17:  Formal report pending.      Assessment and Plan:   Mr. Rendon is a pleasant 67 year old male with chronic systolic heart failure s/p HM  II DTLVAD and multiple CVAs felt to be embolic from LVAD. He is stable from a cardiac standpoint but slightly volume-up. He will take three days of lasix then back to prn. UA ordered for dysuria, will route to PCP.     1. Chronic systolic heart failure secondary to ICM s/p HM II LVAD as DT  Stage D. NYHA Class unable to assess due to functional limitations from CVA.  Fluid status: JVP difficult to see, unable to track weights, +abd bloating, Cr up since May, recommend three days of lasix 20 mg (currently using prn per daughter)  ACEi/ARB losartan 12.5 mg daily  BB taking 50 mg in AM and 25 mg, unclear if succinate or tartrate, will call daughter when home to clarify    Aldosterone antagonist deferred while other medical therapy is prioritized  SCD prophylaxis Med[employer] ICD      2.  S/p LVAD HM II as DT   VAD Interrogation on 8/9/2017  VAD interrogation reviewed with VAD coordinator. Agree with findings. Several PI events per baseline, no power spikes, speed drops, or other findings suspicious of pump malfunction noted. Driveline site CDI without drainage.   MAPS: 78 today.  Controlled.    LDH trends: 990-1440 since May, LDH 1412 today (1059 7/2)  Anticoagulation: INR goal 1.8-2.3, INR 2.7 today, managed by [facility]  Antiplatelet: deferred due to gross hematuria, address with primary cardiologist next visit    3.  CAD:  Continue beta blocker and atorvastatin.  ASA deferred secondary to past history of gross hematuria.    4.  CKD   Creatinine today is 2.12 today.  (2.24 on 8/4).  Creatinine trends since May 1.45-2.24.  Appears volume up today.  - diuresis as above     5.  RUQ abdominal pain  - will do UA given dysuria, hx UTI  - Primary MD managing workup.  Further eval per primary MD.    6.  Hx of embolic CVA:    Residual left sided hemiparesis. Symptoms stable.  Continue Statin.  Antiplatelets deferred secondary to gross hematuria history.    7.   Hyperlipidemia   At goal.  Last FLP 6/17, LDL 80.  - Continue atorvastatin  10 mg daily     8.  Chronic UTI with recent urosepsis    Most recently 7/2. Finished course of cefdinir. Reports new dysuria today.   - Obtain UA.  Consider suppressive abx with chronic infections, but will defer to PCP who is managing.     9.  Follow up:  [facility] clinic in 1 mos and Dr. Lemons in 3 mos. Follow up with Dr. Dill in complex care clinic as outlined for management of multiple medical co-morbidities.         Kelly Shepard, ANISHA, NP-C  8/9/2017    I have evaluated and reviewed all pertinent lab values, vital signs, and medical records.  I have performed a physical exam and agree with the above assessment and recommendations.  This note reflects are joint plan.    Vicky Ziegler APRN, CNP  [phone]      ADAM VIRGEN

## 2017-08-09 NOTE — PATIENT INSTRUCTIONS
Take furosemide (Lasix) 20 mg daily for three days     The coumadin clinic will call you about your INR    We will call you about the echocardiogram results and to ask you whether you are taking metoprolol succinate (the long-acting medicine) or metoprolol tartrate (the short-acting medicine)    We checked a urine sample today, we will have Dr. Dill contact you if you have a bladder infection    We will have you see Dr. Lemons in clinic next time - call us with any questions!

## 2017-08-09 NOTE — MR AVS SNAPSHOT
After Visit Summary   8/9/2017    Sohan Rendon    MRN: [id number]           Patient Information     Date Of Birth          1950        Visit Information        Provider Department      8/9/2017 9:45 AM Vicky Ziegler NP; [facility]        Today's Diagnoses     LVAD (left ventricular assist device) present (H)    -  1    Dysuria        Chronic UTI        Chronic systolic heart failure (H)        RUQ abdominal pain        Other hypervolemia          Care Instructions      Take furosemide (Lasix) 20 mg daily for three days     The coumadin clinic will call you about your INR    We will call you about the echocardiogram results and to ask you whether you are taking metoprolol succinate (the long-acting medicine) or metoprolol tartrate (the short-acting medicine)    We checked a urine sample today, we will have Dr. Dill contact you if you have a bladder infection    We will have you see Dr. Lemons in clinic next time - call us with any questions!              Follow-ups after your visit        Your next 10 appointments already scheduled     Aug 15, 2017  2:00 PM CDT   Return Visit with Thomas Dill MD   [facility] ([facility])    [address]  [address]  [facility] [address]   [phone]            Sep 12, 2017  2:00 PM CDT   Return Visit with Thomas Dill MD   [facility]    [address]  [address]  [facility] [address]   [phone]            Oct 10, 2017  2:00 PM CDT   Return Visit with Thomas Dill MD   [facility]    [address]  [address]  [facility] [address]   [phone]            Nov 14, 2017  2:00 PM CST   Return Visit with Thomas Dill MD   [facility] ([facility])    [address]  [address]  [facility] [address]  "  [phone]            Dec 12, 2017  2:00 PM CST   Return Visit with Thomas Dill MD   [facility] ([facility])    [address] [address]  [address]  [facility] [address] [phone]              Future tests that were ordered for you today     Open Future Orders        Priority Expected Expires Ordered    CT Abdomen Pelvis w/o Contrast Routine  2018            Who to contact     If you have questions or need follow up information about today's clinic visit or your schedule please contact [facility] directly at [phone].  Normal or non-critical lab and imaging results will be communicated to you by [employer], letter or phone within 4 business days after the clinic has received the results. If you do not hear from us within 7 days, please contact the clinic through [employer] or phone. If you have a critical or abnormal lab result, we will notify you by phone as soon as possible.  Submit refill requests through [employer] or call your pharmacy and they will forward the refill request to us. Please allow 3 business days for your refill to be completed.          Additional Information About Your Visit        MyChart Information     [employer] lets you send messages to your doctor, view your test results, renew your prescriptions, schedule appointments and more. To sign up, go to [email or web address] . Click on \"Log in\" on the left side of the screen, which will take you to the Welcome page. Then click on \"Sign up Now\" on the right side of the page.     You will be asked to enter the access code listed below, as well as some personal information. Please follow the directions to create your username and password.     Your access code is: [email or web address]  Expires: 2017  8:55 AM     Your access code will  in 90 days. If you need help or a new code, please call your [facility] or [phone].        Care EveryWhere ID     This is your Care " "EveryWhere ID. This could be used by other organizations to access your [facility] medical records  [id number]        Your Vitals Were     Height BMI (Body Mass Index)                1.727 m (5' 8\") 27.67 kg/m2           Blood Pressure from Last 3 Encounters:   08/09/17 (!) 78/0   07/20/17 99/83   07/18/17 92/64    Weight from Last 3 Encounters:   08/09/17 82.6 kg (182 lb)   07/20/17 81.6 kg (179 lb 14.3 oz)   07/14/17 81.6 kg (180 lb)              We Performed the Following     UA reflex to Microscopic          Today's Medication Changes          These changes are accurate as of: 8/9/17 11:14 AM.  If you have any questions, ask your nurse or doctor.               These medicines have changed or have updated prescriptions.        Dose/Directions    acetaminophen 325 MG tablet   Commonly known as:  TYLENOL   This may have changed:    - when to take this  - reasons to take this   Used for:  Femoral neck fracture, right, closed, initial encounter        Dose:  650 mg   Take 2 tablets (650 mg) by mouth every 6 hours   Quantity:  100 tablet   Refills:  0                Primary Care Provider Office Phone # Fax #    Thomas Dill [phone]       [address] [place] [address]        Equal Access to Services     ALCON SKINNER AH: Hadii emily fernández Soedna, waaxda luqadaha, qaybta kaalmada adeegyada, willie quiñonez. So [facility] [phone].    ATENCIÓN: Si habla español, tiene a smith disposición servicios gratuitos de asistencia lingüística. Llame al [phone].    We comply with applicable federal civil rights laws and [place] laws. We do not discriminate on the basis of race, color, national origin, age, disability sex, sexual orientation or gender identity.            Thank you!     Thank you for choosing [facility]  for your care. Our goal is always to provide you with excellent care. Hearing back from our patients is one way we can " continue to improve our services. Please take a few minutes to complete the written survey that you may receive in the mail after your visit with us. Thank you!             Your Updated Medication List - Protect others around you: Learn how to safely use, store and throw away your medicines at www.disposemymeds.org.          This list is accurate as of: 8/9/17 11:14 AM.  Always use your most recent med list.                   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    Hypoglycemia       * bisacodyl 5 MG EC tablet    DULCOLAX    20 tablet    Take 1 tablet (5 mg) by mouth daily as needed for constipation    Constipation       * bisacodyl 10 MG Suppository    DULCOLAX    5 suppository    Place 1 suppository (10 mg) rectally daily as needed for constipation    Drug-induced constipation       blood glucose monitoring lancets     1 Box    Use to test blood sugars 2 times daily or as directed.    Diabetes mellitus, type 2 (H)       calcium carbonate-vitamin D 500-400 MG-UNIT Tabs per tablet     90 tablet    Take 1 tablet by mouth daily    Cardiac arrhythmia, unspecified cardiac arrhythmia type       finasteride 5 MG tablet    PROSCAR    90 tablet    Take 1 tablet (5 mg) by  mouth daily    Incomplete bladder emptying       furosemide 20 MG tablet    LASIX    20 tablet    Take 1 tablet (20 mg) by mouth as needed    Chronic systolic congestive heart failure (H)       ketotifen 0.025 % Soln ophthalmic solution    ZADITOR/REFRESH ANTI-ITCH    1 Bottle    Place 1 drop into both eyes 2 times daily    Allergic conjunctivitis, bilateral       levETIRAcetam 750 MG tablet    KEPPRA    180 tablet    TAKE 1 TABLET (750 MG) BY MOUTH 2 TIMES DAILY    Convulsions, unspecified convulsion type (H)       loratadine 10 MG tablet    CLARITIN    90 tablet    Take 1 tablet (10 mg) by mouth daily    Allergic rhinitis, unspecified allergic rhinitis type       losartan 25 MG tablet    COZAAR    45 tablet    Take 0.5 tablets (12.5 mg) by mouth daily    CHF (congestive heart failure), NYHA class IV, chronic, systolic (H)       magnesium oxide 400 MG tablet    MAG-OX    14 tablet    Take 1 tablet (400 mg) by mouth 2 times daily    Abdominal distention, Drug-induced constipation       melatonin 3 MG tablet      Take 1 tablet (3 mg) by mouth At Bedtime    Insomnia, unspecified type       * metoprolol 25 MG 24 hr tablet    TOPROL-XL    90 tablet    TAKE 1 TABLET (25 MG) BY MOUTH EVERY EVENING    LVAD (left ventricular assist device) present (H)       * metoprolol 50 MG 24 hr tablet    TOPROL-XL    90 tablet    TAKE 1 TABLET (50 MG) BY MOUTH DAILY    CHF (congestive heart failure), NYHA class IV, chronic, systolic (H)       metoprolol 25 MG tablet    LOPRESSOR    60 tablet    Take 50 mg (2 tablets) in the morning and 25 mg (1 tablet) in the evening.    Chronic systolic congestive heart failure (H)       nitroGLYcerin 0.4 MG sublingual tablet    NITROSTAT    30 tablet    Place 1 tablet (0.4 mg) under the tongue every 5 minutes as needed for chest pain    Coronary artery disease involving native coronary artery with unstable angina pectoris (H)       nystatin 270415 UNIT/GM Powd    MYCOSTATIN    60 g    Apply to  affected areas of skin in the groin and on the scrotum three times a day as needed.        * order for DME     1 Device    Equipment being ordered: Lift chair.  Please see indications and face-to-face encounter details in 2/3/15 Office Visit note.    Fracture of femoral neck, right, closed, initial encounter, Stroke (H), CHF (congestive heart failure), NYHA class IV (H)       * order for DME     2 each    Equipment being ordered: Bilateral leg supports for manual wheelchair.    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  Please fax to [employer] [facility] [employer]; I called them 11/26/16 and they requested the new order.  Face to face is in my 10/26/16 note.    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, subsequent encounter       pantoprazole 40 MG EC tablet    PROTONIX    180 tablet    Take 1 tablet (40 mg) by mouth daily    Gastrointestinal hemorrhage associated with chronic superficial gastritis       SENEXON-S 8.6-50 MG per tablet   Generic drug:  senna-docusate     60 tablet    TAKE 1-2 TABLETS BY MOUTH 2 TIMES DAILY AS NEEDED FOR CONSTIPATION    Slow transit constipation       simethicone 80 MG chewable tablet    MYLICON    180 tablet    Take 1 tablet (80 mg) by mouth 4 times daily    Slow transit constipation       tamsulosin 0.4 MG capsule    FLOMAX    90 capsule    TAKE 1 CAPSULE EVERY DAY    Incomplete bladder emptying       thiamine 100 MG tablet     90 tablet    TAKE 1 TABLET (100 MG) BY MOUTH DAILY    Cerebrovascular accident (CVA) due to embolism of right middle cerebral artery (H)       warfarin 3 MG tablet    COUMADIN    180 tablet    Take 3mgtonight and tomorrow night. Follow up with coumadin clinic on Tuesday for further dosing    Cerebrovascular accident (CVA) due to embolism of right middle cerebral artery (H)       * Notice:  This list has 11 medication(s) that are the same as other medications prescribed for you. Read the directions carefully, and ask your doctor or other care provider to review them with you.

## 2017-08-09 NOTE — NURSING NOTE
1). PUMP DATA  Primary controller serial number: [id number]     II:   Flow: 4.3 L/min,    Speed: 8800 RPMs,     PI: 5.2 ,  Power: 5.5 Slaughter,      Primary controller   Data downloaded: No   Equipment and driveline assessed for damage: Yes     Back up : Serial number: [id number]  Programmed settings identical to the settings on the primary controller :Yes      Education complete: Yes   Have equipment serviced yearly (if applicable):Yes    2). ALARMS  Alarms reported by patient since last pump evaluation: No  Alarms or other finding noted during pump data history and alarm download: Pt has frequent PI events. PIs 4s-5s. No VAD alarms on controller history.    Action Taken:  Reviewed data with patient: Yes      3). DRESSING CHANGE / DRIVELINE ASSESSMENT  Dressing change completed today: Yes  Appearance of Driveline site: C/D/I, no redness, swelling, tenderness, or drainage.     Driveline stabilization: Method: [employer]  [ Teaching reinforced on need for stabilization of Driveline. ]

## 2017-08-09 NOTE — MR AVS SNAPSHOT
Sohan [facility]   8/9/2017   Anticoagulation Therapy Visit    Description:  67 year old male   Provider:  Blanca Bah, RN   Department:  [facility]           INR as of 8/9/2017     Today's INR 2.67!      Anticoagulation Summary as of 8/9/2017     INR goal    Prior goal 1.8-2.5   Today's INR 2.67!   Full instructions 8/9: 1.5 mg; 8/10: 3 mg; 8/11: 4.5 mg; 8/12: 3 mg; 8/13: 3 mg   Next INR check 8/14/2017    Indications   LVAD (left ventricular assist device) present [Z95.811]  Long-term (current) use of anticoagulants [Z79.01] [Z79.01]         August 2017 Details    Sun Mon Tue Wed Thu Fri Sat       1               2               3               4               5                 6               7               8               9      1.5 mg   See details      10      3 mg         11      4.5 mg         12      3 mg           13      3 mg         14            15               16               17               18               19                 20               21               22               23               24               25               26                 27               28               29               30               31                  Date Details   08/09 This INR check       Date of next INR:  8/14/2017         How to take your warfarin dose     To take:  1.5 mg Take 0.5 of a 3 mg tablet.    To take:  3 mg Take 1 of the 3 mg tablets.    To take:  4.5 mg Take 1.5 of the 3 mg tablets.

## 2017-08-09 NOTE — TELEPHONE ENCOUNTER
LAST VISIT    7/14/17  NEXT VISIT    8/19/17  PLAN      Assessment and Plan:    Sohan is a 67 year old male with ICM s/p HM II LVAD who appears to be hypervolemic today on exam.  He will take Furosemide 20 mg daily x 3 days and then will take it as needed per prior recommendations.  His daughter is giving him a higher dose of Toprol XL 50 mg in the am and 25 mg in the pm.  He was reduced to 25 mg daily secondary to sepsis in the hospital. He will continue Toprol XL 50 mg in the am and 25 mg in the pm. He will follow up in [facility] clinic in two weeks with a repeat echocardiogram prior.       1.  Chronic systolic heart failure secondary to ICM.  Stage D  NYHA Class IIIB  ACEi/ARB: yes, Losartan 25 mg daily.   BB: yes, Toprol XL 50 mg in the am, 25 mg in pm  Aldosterone antagonist: deferred while other medical therapy is prioritized  SCD prophylaxis: ICD  Fluid status: hypervolemic  S/P LVAD implant as DT   Anticoagulation: Warfarin. INR 3.14 today, dosing per Anticoagulation clinic.  INR goal of 1.8-2.5  Antiplatelet:  Deferred per Dr. Lemons's recommendations in previous record review secondary to prior history of gross hematuria.        2.  S/p HM II LVAD implant as DT:  Anticoagulation: Warfarin. INR 3.14 today.  Suspect secondary to liver congestion.  Dosage adjusted per Anticoagulation clinic.  INR goal of 1.8-2.5 secondary to history of gross hematuria.  Antiplatelet:  Deferred per Dr. Lemons's recommendations in previous record review secondary to prior history of gross hematuria.   MAP:  78 today.   VAD Interrogation today: VAD interrogation reviewed with VAD coordinator. Agree with findings. Frequent PI events, without power spikes, speed drops, or other findings suspicious of pump malfunction noted.      3.  CKD:  Creatinine today is 2.18.  Suspect cardiorenal syndrome.  Will  monitor closely to see if diuresing improves kidney function.       4.  Hyperlipidemia: Controlled on  Atorvastatin 10 mg daily.  Last  FLP 6/17.      5. Chronic UTI:  Primary MD is scheduled to see him for a home visit early next week.  Daughter will follow up with his primary MD with questions regarding long term antibiotic prophylaxis.       6.  Follow-up in [facility] clinic in two weeks with echocardiogram prior.   BMP in one week.

## 2017-08-09 NOTE — PROGRESS NOTES
D: Pt's daughter called, stating his power module monitor was no longer working. She had attempted to unplug it and restart it without success.   I:  ordered to send new monitor to pt. Monitor SN: [id number]. Monitor to power module adaptor LOT#: [id number]. It should arrive by 1430 today. Pt's daughter was instructed to call the on-call VAD Coordinator with any questions, changes, or concerns.   A: Pt's daughter verbalized understanding.   P: Will continue with cares as needed.

## 2017-08-09 NOTE — TELEPHONE ENCOUNTER
Spoke with Almaz and given address and phone number to call and schedule CT.    Concetta Burr RN

## 2017-08-10 NOTE — PROGRESS NOTES
Son called in and clarified that his father's medications are:  metoprolol succ 50 mg in the morning.  metoprolol succ 25 mg in the evening.  He does not take lopressor.

## 2017-08-14 NOTE — MR AVS SNAPSHOT
[facility]ohan [facility]   8/14/2017   Anticoagulation Therapy Visit    Description:  67 year old male   Provider:  Dafne Sanders RN   Department:  [facility]           INR as of 8/14/2017     Today's INR 2.6!      Anticoagulation Summary as of 8/14/2017     INR goal    Prior goal 1.8-2.5   Today's INR 2.6!   Full instructions 8/14: 3 mg; 8/15: 3 mg; 8/16: 3 mg; 8/17: 3 mg; 8/18: 3 mg; 8/19: 3 mg; 8/20: 3 mg   Next INR check 8/21/2017    Indications   LVAD (left ventricular assist device) present [Z95.811]  Long-term (current) use of anticoagulants [Z79.01] [Z79.01]         August 2017 Details    Sun Mon Tue Wed Thu Fri Sat       1               2               3               4               5                 6               7               8               9               10               11               12                 13               14      3 mg   See details      15      3 mg         16      3 mg         17      3 mg         18      3 mg         19      3 mg           20      3 mg         21            22               23               24               25               26                 27               28               29               30               31                  Date Details   08/14 This INR check       Date of next INR:  8/21/2017         How to take your warfarin dose     To take:  3 mg Take 1 of the 3 mg tablets.

## 2017-08-14 NOTE — PROGRESS NOTES
ANTICOAGULATION FOLLOW-UP CLINIC VISIT    Patient Name:  Sohan Rendon  Date:  8/14/2017  Contact Type:  Telephone    SUBJECTIVE:     Patient Findings     Positives OTC meds (Taking tylenol occassionally-twice on Sunday.)    Comments Pt took PRN lasix on [facility] last week.           OBJECTIVE    INR   Date Value Ref Range Status   08/14/2017 2.6  Final     Chromogenic Factor 10   Date Value Ref Range Status   08/10/2014 24 (L) 70 - 130 % Final     Comment:     Therapeutic Range:  A Chromogenic Factor 10 level of approximately 20-40%   inversely correlates with an INR of 2-3 for patients receiving Warfarin.   Chromogenic Factor 10 levels below 20% indicate an INR greater than 3 and   levels above 40% indicate an INR less than 2.       Factor 2 Assay   Date Value Ref Range Status   07/21/2014 25 (L) 60 - 140 % Final     Comment:     Analyte Specific Reagents (ASRs) are used in many laboratory tests necessary   for   standard medical care and generally do not require FDA approval.  This test   was   developed and its performance characteristics determined by [facility].  It has not been cleared or approved by   the [place] Food and Drug Administration.         ASSESSMENT / PLAN  INR assessment THER    Recheck INR In: 1 WEEK    INR Location [facility]      Anticoagulation Summary as of 8/14/2017     INR goal    Prior goal 1.8-2.5   Today's INR 2.6!   Maintenance plan No maintenance plan   Full instructions 8/14: 3 mg; 8/15: 3 mg; 8/16: 3 mg; 8/17: 3 mg; 8/18: 3 mg; 8/19: 3 mg; 8/20: 3 mg   Plan last modified Blanca Bah RN (5/30/2017)   Next INR check 8/21/2017   Priority INR   Target end date Indefinite    Indications   LVAD (left ventricular assist device) present [Z95.811]  Long-term (current) use of anticoagulants [Z79.01] [Z79.01]         Anticoagulation Episode Summary     INR check location     Preferred lab     Send INR reminders to [facility]     Comments Goal Range is 1.8-2.3  [facility] comes out to draw INR  Sofya Ph [phone]  Daughter Almaz Ph [phone]      Anticoagulation Care Providers     Provider Role Specialty Phone number    Evon Lemons MD Responsible Cardiology [phone]            See the Encounter Report to view Anticoagulation Flowsheet and Dosing Calendar (Go to Encounters tab in chart review, and find the Anticoagulation Therapy Visit)  Spoke with Sofya [facility] nurse.    Dafne Sanders RN

## 2017-08-15 NOTE — PROGRESS NOTES
SUBJECTIVE:                                                      Sohan Rendon is a 67 year old male who has an LVAD due to severe ischemic cardiomyopathy. He has had recurrent embolic strokes, despite careful anticoagulation, with the LVAD believed to be the source. He was hospitalized 9/23 - 10/4/14 for acute ischemic subcortical stroke, superimposed upon previous R PICA and R MCA strokes. During that hospital stay, several discussions were carried out with the patient's family regarding the possibility of LVAD removal and conversion to Hospice. Per my discussion with his attending cardiologist, Dr. Lemons, family are well aware of the patient's terminal condition, but they steadfastly decline enrolling him into Hospice. They have wished to continue LVAD and other cares at home, while ensuring his comfort. Dr. Lemons made a referral for this patient to be seen at home by the [facility] to provide primary care management, though the LVAD/Cardiology clinics will remain in charge of his cardiology care.       Mr. Rendon was seen today in his home along with a daughter and an  for the above issues, as well as for the following health issues:         Heart Failure Follow-up    Patient with end-stage cardiomyopathy, Stage D, [facility] IIIB. LVAD in place. History of recurrent emboli from LVAD despite careful anticoagulation. Patient is not a candidate for device revision or exchange per Cardiology notes.    Symptoms: No recent recurrence of vague left anterior chest pain that can persist for days at a time, and which he and I have previously attributed to muscular strain.    Shortness of breath: Happens at rest infrequently, but not worse.    Lower extremity edema: No more than trace pedal edema at baseline, currently stable.    Chest pain: See above.    Using more pillows than normal: N/A; has adjustable (hospital) bed.    Cough at night: Yes- occasional cough, present for months, believed to be due to  non-cardiac causes.    Weight: Not checking weight daily; patient unable to weight bear.    Cardiology visits, ER/UC, or hospital admissions since last visit: Not for cardiac disease.    Medication side effects: None described to cardiac meds.    Seen most recently 8/9/17 in Cardiology follow-up, portions of that note:  Assessment and Plan:   Mr. Rendon is a pleasant 67 year old male with chronic systolic heart failure s/p HM II DTLVAD and multiple CVAs felt to be embolic from LVAD. He is stable from a cardiac standpoint but slightly volume-up. He will take three days of lasix then back to prn. UA ordered for dysuria, will route to PCP.      1. Chronic systolic heart failure secondary to ICM s/p HM II LVAD as DT  Stage D. NYHA Class unable to assess due to functional limitations from CVA.  Fluid status: JVP difficult to see, unable to track weights, +abd bloating, Cr up since May, recommend three days of lasix 20 mg (currently using prn per daughter)  ACEi/ARB losartan 12.5 mg daily  BB taking 50 mg in AM and 25 mg, unclear if succinate or tartrate, will call daughter when home to clarify    Aldosterone antagonist deferred while other medical therapy is prioritized  SCD prophylaxis [employer] ICD        2.  S/p LVAD HM II as DT   VAD Interrogation on 8/9/2017  VAD interrogation reviewed with VAD coordinator. Agree with findings. Several PI events per baseline, no power spikes, speed drops, or other findings suspicious of pump malfunction noted. Driveline site CDI without drainage.   MAPS: 78 today.  Controlled.    LDH trends: 990-1440 since May, LDH 1412 today (1059 7/2)  Anticoagulation: INR goal 1.8-2.3, INR 2.7 today, managed by [facility]  Antiplatelet: deferred due to gross hematuria, address with primary cardiologist next visit     3.  CAD:  Continue beta blocker and atorvastatin.  ASA deferred secondary to past history of gross hematuria.   4.  CKD   Creatinine today is 2.12 today.  (2.24 on 8/4).  Creatinine trends  since May 1.45-2.24.  Appears volume up today.  - diuresis as above      5.  RUQ abdominal pain  - will do UA given dysuria, hx UTI  - Primary MD managing workup.  Further eval per primary MD.     6.  Hx of embolic CVA:    Residual left sided hemiparesis. Symptoms stable.  Continue Statin.  Antiplatelets deferred secondary to gross hematuria history.    7.   Hyperlipidemia   At goal.  Last FLP 6/17, LDL 80.  - Continue atorvastatin 10 mg daily      8.  Chronic UTI with recent urosepsis    Most recently 7/2. Finished course of cefdinir. Reports new dysuria today.   - Obtain UA.  Consider suppressive abx with chronic infections, but will defer to PCP who is managing.      9.  Follow up:  [facility] in 1 mos and Dr. Lemons in 3 mos. Follow up with Dr. Dill in complex care clinic as outlined for management of multiple medical co-morbidities.            Kelly Shepard, DNP, NP-C  8/9/2017          Cerebrovascular Follow-up    History of multiple embolic strokes, LVAD source, despite warfarin anticoagulation. Most recent documented stroke September 2014.    Residual symptoms: Dysarthria, dysphagia, left arm and leg weakness. Dysarthria has improved remarkably. Dense left hemiparesis is unimproved. Dysphagia has improved to permit PO feeding of an unrestricted texture diet.  Diagnosed with new-onset seizure disorder 6/2015; see below.    Worsened or new symptoms since last visit: None.    Daily aspirin use: Stopped during 9/2016 hospital stay for gross hematuria and remains on hold.  At Urology recommendation, ASA is on hold until hematuria confirmed resolved.  Warfarin has continued, though inpatient Urology notes suggest a more conservative INR target of 1.8 to 2.3 instead of the prior Cardiology target of 2.5 to 3.0 (indication: history of recurrent stroke associated with LVAD).  Dr. Lemons, Cardiology, has advised that we should continue to hold ASA, and continue to aim for INR 1.8 to 2.3, until further  "notice.          Seizures    Duration: Initially diagnosed during hospitalization of 6/2015. Was noted to have seizure in-house, levetiracetam started.    Description (location/character/radiation): Described as \"focal onset epilepsy\" in Neuro consultation.    Intensity: No witnessed seizure activity since return home from 6/2015 admission.    History (similar episodes/previous evaluation): Neuro consultation during hospital stay 6/2015. No neuro follow-up scheduled.    Precipitating or alleviating factors: History of multiple embolic CVA due to LVAD thrombosis, most recently 9/2014.    Therapies tried and outcome: Levetiracetam 500 mg BID started 6/2015, no witnessed seizure activity since Rx started.                                                                                     Amount of exercise or physical activity: None; essentially bed bound. Family says he can stand for brief periods of time if supported, cannot walk. There is a Jay lift at home used for transfer to chair or wheelchair. Family requested replacement manual wheelchair that will permit positioning of lower extremities given right leg fracture which I ordered 3/2016, and which has finally arrived.     Problems taking medications regularly: Has some mild dysphagia, but is not missing doses. Meds administered by daughters.    Medication side effects: Lethargic occasionally; daughter and I wonder if this could represent medication side effect.    Diet: regular (no restrictions). Daughters feed him. No witnessed choking or aspiration.          Hyperlipidemia Follow-Up        Rate your low fat/cholesterol diet?: Not carefully monitoring fat, but meals prepared by daughters are generally low fat.    Taking statin?  Yes, atorvastatin.  Reports no myalgia.    Other lipid medications/supplements?:  None.       Chronic Kidney Disease         Current NSAID use?  None.    GFR reviewed over past several months: GFR 50 to 53 2/2017, has demonstrated " gradual decline since, currently 36 to 37.    I made nephrology referral last visit 7/2017; daughter reports this did not get scheduled.     Neck Pain       Duration: Last few weeks.    Description: Previously was able to point out point tenderness over left upper trapezius, reproducible when I pressed on it with one finger.  Had no nuchal rigidity, no anterior neck pain.    Intensity:  Nearly absent over the past several days.    Accompanying signs and symptoms: None.    History (similar episodes/previous evaluation): Bed bound due to complications of stroke.  Has previously reported occipital and right nuchal pain.    Precipitating or alleviating factors: I wonder if bed bound status and positioning of head up on pillow contribute.    Therapies tried and outcome: Acetaminophen has been helpful.  No other Rx has been necessary.      Problem list and histories reviewed & adjusted, as indicated.  Additional history: as documented    BP Readings from Last 3 Encounters:   08/16/17 92/60   08/09/17 (!) 78/0   07/20/17 99/83    Wt Readings from Last 3 Encounters:   08/09/17 182 lb (82.6 kg)   07/20/17 179 lb 14.3 oz (81.6 kg)   07/14/17 180 lb (81.6 kg)                  Labs reviewed in EPIC      Reviewed and updated as needed this visit by clinical staff       Reviewed and updated as needed this visit by Provider         ROS:  Obtained both from patient's daughters, as well as the patient himself via .   CONSTITUTIONAL: NEGATIVE for chills, fever, sweats.   EYES: Reports ongoing itching and pain from OD, ongoing use of ketotifen has been helpful.  ENT/MOUTH: NEGATIVE for nasal congestion, sinus pressure. No recurrence of epistaxis since last visit.  RESP: NEGATIVE for dyspnea, wheeze, or respiratory chest pain. Cough has been mild/minimal since last visit.  CV: NEGATIVE for orthopnea, or worsened lower extremity edema.   GI: Had episode of right-sided abdominal pain a week ago.  I ordered labs which were  non-revealing.  Ultrasound performed at home showed a solitary gallstone but no evidence of biliary obstruction.  Daughter requested abdominal CT, which I ordered (non-contrast), but didn't take him in for the study.  Pain has been absent over the past several days. Constipation is now generally well-controlled on Senna 1 or 2 per day; has had daily BM.  : Mcarthur was removed by Urology 5/2017.  NEGATIVE for hematuria, dysuria, difficulties urinating, or flank pain.  Daughter remains very focused on urine color, presumes infection if urine becomes dark, not currently a problem.  MUSCULOSKELETAL: NEGATIVE for change or worsening in right hip pain.  Neck pain described above.      INTEG: No rashes reported.  NEURO: NEGATIVE for new or worsened focal numbness or weakness or syncope.    PSYCHIATRIC: NEGATIVE for changes in mild baseline anxiety, no panic, no recent change in mood.    OBJECTIVE:     BP 92/60  Pulse 60  Resp 16  SpO2 99% on room air.  There is no height or weight on file to calculate BMI.    GENERAL: Appears clean, comfortable, and sleepy, propped upright in bed.  EYES: Eyes grossly normal to inspection, no conjunctivitis or discharge.  HENT: Nares patent by sniff, no discharge.    NECK: Trachea midline, no stridor.    RESP: No accessory muscle use. A couple of coughs during this visit, sputum white and foamy. Symmetrical breath sounds. Lungs clear throughout on inspiration and expiration. Expiration not prolonged, no wheeze.  CV: Regular rate and rhythm, non-tachycardic. Prominent LVAD noise, which sounds like someone is running a vacuum  in the near background, makes exam difficult. S1 S2 softly audible, no murmur or extra sound. No lower extremity edema. Extremities slightly cool x4.  ABDOMEN: LVAD entry site not undressed for exam. Protuberant, though soft, non-tender, no guarding over all quadrants. Liver and spleen not palpable, no masses palpable. Bowel sounds positive.  : External  "genitalia normal.  MS: No bony deformities noted.  No red or inflamed joints.  See neck and upper back exam above.  SKIN: Warm and dry, no rashes where skin visible.    NEURO: Mostly alert, but dozed off a couple of times, conversant, oriented and appropriate in conversation per , though some very mild dysarthria present. Mild left facial droop noted, cranial nerves II - XII otherwise grossly intact.  Dense left upper and lower extremity paresis persists.  PSYCH: Calm, conversant. Language barrier prevents good exam, though affect appears normal.    Diagnostic Test Results:  Results for orders placed or performed in visit on 08/14/17   INR   Result Value Ref Range    INR 2.6      *Note: Due to a large number of results and/or encounters for the requested time period, some results have not been displayed. A complete set of results can be found in Results Review.       ASSESSMENT/PLAN:     (R10.11) RUQ pain  (primary encounter diagnosis)  Comment: Occurred about two weeks ago, daughter phoned to report it.  I ordered labs which were non-revealing.  Ultrasound performed at home showed a solitary gallstone but no evidence of biliary obstruction.  Daughter requested abdominal CT, which I ordered (non-contrast), but she didn't take him in for the study.  Pain has been absent over the past several days, daughter says \"he's fine\", and does not desire any more workup or referral.  Plan: Watch for recurrence.    (S46.638M) Trapezius strain, left, subsequent encounter    Comment: Patient's previous \"neck pain\" was actually point tenderness, reproducible my manual pressure, over the left upper trapezius.  Pain has been absent over the past several days.  Plan: Continue OTC Aspercreme topically PRN, and perhaps not propping his head quite so forward on pillows.  Watch for recurrence.     (R33.8) Urinary retention due to (N40.1) Benign non-nodular prostatic hyperplasia with lower urinary tract symptoms  Comment: " Patient was unable to void in-hospital, so Mcarthur was placed, and subsequently removed 5/2017 at outpatient Urology visit.  Urology has started finasteride and tamsulosin, both of which continue.  Plan: Missed Urology follow-up scheduled 6/2017 due to hospitalization; daughter has not called to reschedule.  Finasteride and tamsulosin continue.      (D62) Acute posthemorrhagic anemia  Comment: Has element of (D50.0) Chronic anemia due to iron dieficiency, baseline Hgb has been in the 11's.  GI blood loss during 5/2017 hospital stay resulted in doris Hgb of 8.4 and discharge Hgb of 8.7.  He is having no melena or other blood loss.  He remains on warfarin, target INR 1.8 to 2.3 per Cardiology (lower INR selected initially due to gross hematuria).  Serum Fe was 44 on 6/22/17 labs, which were done one month after oral FeSO4 were stopped.  Hgb remains at recent baseline of 9.7.  Plan: Follow Hgb, recheck iron in a few months.  May need to restart FeSO4 if Hgb declines and/or if serum Fe drops.      (I50.22) CHF (congestive heart failure), NYHA class IV, (Z95.811) LVAD (left ventricular assist device) present  Comment: Patient has recurrent embolic events from his LVAD, including recurrent stroke, despite careful anticoagulation. His LVAD cannot be exchanged, per my conversation with Dr. Lemons, Cardiology. Heart failure overall appears to be well-compensated today. VAD interrogated during recent cardiology visit and hospital stay, no abnormalities found.  At [facility] follow-up 8/9/17, furosemide 20 mg QD for three days was Rx'ed, is now complete.  Losartan was cut in half to 12.5 mg QD, possibly in response to declining GFRPatient appears to be at his cardiac baseline.  Plan: Continue current care, though focusing on palliative care. He continues on warfarin as directed by VAD Clinic. Continue to defer management of rhythm, hemodynamics, anticoagulation to the Cardiology/LVAD Clinic.  Previous INR target was 2.5 to 3.0  per Cardiology, but a lower target INR of 1.8 to 2.3 is currently being used due to recurrent gross hematuria.  ASA reamains on hold at Cardiology advice due to hematuria.  These INR and ASA modifications are to continue until further notice, per message from Cardiology.  Next Cardiology follow-up approx 11/2017.        (R56.9) Focal onset epilepsy  Comment: Seizure at home 6/2015 prompted hospitalization, patient now on levetiracetam. No additional seizure activity noted since Rx started.   Plan: Continue levetiracetam 500 mg Q12H, observe for seizure recurrence.        (I63.411) Cerebrovascular accident 9/2014 due to embolism of R MCA  Comment: Residual deficits include dysarthria, which has improved remarkably, dysphagia (see below), dense left hemiparesis, and new-onset epilepsy as of 6/2015. Given that source of embolic strokes is from LVAD, and that embolism cannot be prevented despite anticoagulation, future events are possible or even likely. He had what appears to have been a TIA during 4/2016 hospital stay when warfarin and ASA were held due to gross hematuria, but subsequent neuro workup did not reveal new stroke.  Neuro status has been stable since that time.  Plan: Warfarin anticoagulation continues per Cardiology, ASA held due to hematuria, details above.  Observe for new events.      (R13.10) Dysphagia  Comment: Had bedside swallow evaluation from Speech Therapy 12/2014. He had timbo aspiration only to thin liquids. Per daughters, although they have to feed him due to weakness from stroke, he appears to swallow without choking or aspiration episodes.  The only dysphagia they notice is to large meds like levetiracetam.  Plan: Continue speech therapy recommendations: patient should be sitting straight up when eating, take small bolus sizes, eat slowly, not talk during eating. Continue PO diet.      (E78.5) Hyperlipidemia  Comment: Remains on atorvastatin, lipids well-controlled on 6/22/17 labs.  Plan:   Continue atorvastatin.      (N18.3) Chronic kidney disease  Comment: GFR has demonstrated gradual decline over past 6 months, from around 52 to around 36.  Unclear why this is happening: worsened heart function vs ARB therapy with losartan vs embolic disease (he has previous embolism from LVAD to brain) vs other.  Is on no nephrotoxins.  He has no significant metabolic abnormalities due to renal failure, and I doubt his anemia is due to renal disease at GFR 36.  Plan: I'm going to ask for repeat GFR later this month when INR drawn, at which point we can see if losartan dose reduction to 12.5 mg QD results in any improvement.  Daughters did not schedule the nephrology visit I scheduled last month.  Will hold off rescheduling until labs are back.    FUTURE LABS:       - Schedule a non-fasting blood draw later this month mostly for GFR.    FUTURE APPOINTMENTS:       - Follow-up visit scheduled for 9/12/17 at 1400.    Thomas Dill MD  [facility]

## 2017-08-15 NOTE — MR AVS SNAPSHOT
After Visit Summary   8/15/2017    Sohan Rendon    MRN: [id number]           Patient Information     Date Of Birth          1950        Visit Information        Provider Department      8/15/2017 2:00 PM Thomas Dill MD; [employer] 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  We made no medication changes today.  I entered the new losartan dose of 12.5 mg daily into the computer.    2.  I ordered some labs for about two weeks from now primarily to look at kidney function.  Hopefully it improves on this lower dose of losartan.    3.  I'll be back to see you on Tuesday, September 12th at 2 PM.          Follow-ups after your visit        Your next 10 appointments already scheduled     Sep 12, 2017  2:00 PM CDT   [facility]    [address]  [address]  [facility] [address]   [phone]            Oct 10, 2017  2:00 PM CDT   [facility] Visit with [facility] Dill MD   [facility]    [address]  [address]  [facility] [address]   [phone]            Nov 14, 2017  2:00 PM CST   [facility] Visit with [facility]ill MD   [facility]    [address]  [address]  [facility] [address] [phone]            Dec 12, 2017  2:00 PM CST   Return Visit with Thomas Dill MD   [facility]    [address]  [address]  [facility] [address] [phone]              Future tests that were ordered for you today     Open Future Orders        Priority Expected Expires Ordered    Basic metabolic panel  (Ca, Cl, CO2, Creat, Gluc, K, Na, BUN) Routine 2017            Who to contact     If you have questions or need follow up information about today's clinic visit or your schedule please contact [facility] [place] directly at [phone].  Normal or non-critical lab and imaging results will be communicated to you by MyChart, letter or phone within 4 business days after the clinic has received the results. If you do not hear from us within 7 days, please contact the clinic through MyChart or phone. If you have a critical or abnormal lab result, we will notify you by phone as soon as possible.  Submit refill requests through [employer] or call your pharmacy and they will forward the refill request to us. Please allow 3 business days for your refill to be completed.          Additional Information About Your Visit        [employer] Information     [employer] lets you send messages to your doctor, view your test results, renew your prescriptions, schedule appointments and more. To sign up, go to [email or web address] . Click on \"Log in\" on the left side of the screen, which will take you to the Welcome page. Then click on \"Sign up Now\" on the right side of the page.     You will be asked to enter the access code listed below, as well as some personal information. Please follow the directions to create your username and password.     Your access code is: [email or web address]  Expires: 2017  8:55 AM     Your access code will  in 90 days. If you need help or a new code, " please call your [place] clinic or [phone].        Care EveryWhere ID     This is your Care EveryWhere ID. This could be used by other organizations to access your [place] medical records  [id number]        Your Vitals Were     Pulse Respirations Pulse Oximetry             60 16 99%          Blood Pressure from Last 3 Encounters:   08/16/17 92/60   08/09/17 (!) 78/0   07/20/17 99/83    Weight from Last 3 Encounters:   08/09/17 182 lb (82.6 kg)   07/20/17 179 lb 14.3 oz (81.6 kg)   07/14/17 180 lb (81.6 kg)                 Today's Medication Changes          These changes are accurate as of: 8/15/17 11:59 PM.  If you have any questions, ask your nurse or doctor.               These medicines have changed or have updated prescriptions.        Dose/Directions    acetaminophen 325 MG tablet   Commonly known as:  TYLENOL   This may have changed:    - when to take this  - reasons to take this   Used for:  Femoral neck fracture, right, closed, initial encounter        Dose:  650 mg   Take 2 tablets (650 mg) by mouth every 6 hours   Quantity:  100 tablet   Refills:  0                Primary Care Provider Office Phone # Fax #    Thomas Dill [phone]       [address] [place] [address]        Equal Access to Services     ALCON SKINNER AH: Hadii emily matuteo Solillieali, waaxda luqadaha, qaybta kaalmada adeegyada, willie quiñonez. So [facility] [phone].    ATENCIÓN: Si habla español, tiene a smith disposición servicios gratuitos de asistencia lingüística. Llame al [phone].    We comply with applicable federal civil rights laws and [place] laws. We do not discriminate on the basis of race, color, national origin, age, disability sex, sexual orientation or gender identity.            Thank you!     Thank you for choosing [facility]  for your care. Our goal is always to provide you with excellent care. Hearing back from our  patients is one way we can continue to improve our services. Please take a few minutes to complete the written survey that you may receive in the mail after your visit with us. Thank you!             Your Updated Medication List - Protect others around you: Learn how to safely use, store and throw away your medicines at www.disposemymeds.org.          This list is accurate as of: 8/15/17 11:59 PM.  Always use your most recent med list.                   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    Hypoglycemia       * bisacodyl 5 MG EC tablet    DULCOLAX    20 tablet    Take 1 tablet (5 mg) by mouth daily as needed for constipation    Constipation       * bisacodyl 10 MG Suppository    DULCOLAX    5 suppository    Place 1 suppository (10 mg) rectally daily as needed for constipation    Drug-induced constipation       blood glucose monitoring lancets     1 Box    Use to test blood sugars 2 times daily or as directed.    Diabetes mellitus, type 2 (H)       calcium carbonate-vitamin D 500-400 MG-UNIT Tabs per tablet     90 tablet    Take 1 tablet by mouth daily    Cardiac arrhythmia, unspecified cardiac arrhythmia type       finasteride 5 MG tablet    PROSCAR    90 tablet     Take 1 tablet (5 mg) by mouth daily    Incomplete bladder emptying       furosemide 20 MG tablet    LASIX    20 tablet    Take 1 tablet (20 mg) by mouth as needed    Chronic systolic congestive heart failure (H)       ketotifen 0.025 % Soln ophthalmic solution    ZADITOR/REFRESH ANTI-ITCH    1 Bottle    Place 1 drop into both eyes 2 times daily    Allergic conjunctivitis, bilateral       levETIRAcetam 750 MG tablet    KEPPRA    180 tablet    TAKE 1 TABLET (750 MG) BY MOUTH 2 TIMES DAILY    Convulsions, unspecified convulsion type (H)       loratadine 10 MG tablet    CLARITIN    90 tablet    Take 1 tablet (10 mg) by mouth daily    Allergic rhinitis, unspecified allergic rhinitis type       losartan 25 MG tablet    COZAAR    45 tablet    Take 0.5 tablets (12.5 mg) by mouth daily    CHF (congestive heart failure), NYHA class IV, chronic, systolic (H)       magnesium oxide 400 MG tablet    MAG-OX    14 tablet    Take 1 tablet (400 mg) by mouth 2 times daily    Abdominal distention, Drug-induced constipation       melatonin 3 MG tablet      Take 1 tablet (3 mg) by mouth At Bedtime    Insomnia, unspecified type       * metoprolol 25 MG 24 hr tablet    TOPROL-XL    90 tablet    TAKE 1 TABLET (25 MG) BY MOUTH EVERY EVENING    LVAD (left ventricular assist device) present (H)       * metoprolol 50 MG 24 hr tablet    TOPROL-XL    90 tablet    TAKE 1 TABLET (50 MG) BY MOUTH DAILY    CHF (congestive heart failure), NYHA class IV, chronic, systolic (H)       nitroGLYcerin 0.4 MG sublingual tablet    NITROSTAT    30 tablet    Place 1 tablet (0.4 mg) under the tongue every 5 minutes as needed for chest pain    Coronary artery disease involving native coronary artery with unstable angina pectoris (H)       nystatin 244189 UNIT/GM Powd    MYCOSTATIN    60 g    Apply to affected areas of skin in the groin and on the scrotum three times a day as needed.        * order for DME     1 Device    Equipment being ordered: Lift chair.   Please see indications and face-to-face encounter details in 2/3/15 Office Visit note.    Fracture of femoral neck, right, closed, initial encounter, Stroke (H), CHF (congestive heart failure), NYHA class IV (H)       * order for DME     2 each    Equipment being ordered: Bilateral leg supports for manual wheelchair.    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  Please fax to [facility]; I called them 11/26/16 and they requested the new order.  Face to face is in my 10/26/16 note.    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, subsequent encounter       pantoprazole 40 MG EC tablet    PROTONIX    180 tablet    Take 1 tablet (40 mg) by mouth daily    Gastrointestinal hemorrhage associated with chronic superficial gastritis       SENEXON-S 8.6-50 MG per tablet   Generic drug:  senna-docusate     60 tablet    TAKE 1-2 TABLETS BY MOUTH 2 TIMES DAILY AS NEEDED FOR CONSTIPATION    Slow transit constipation       simethicone 80 MG chewable tablet    MYLICON    180 tablet    Take 1 tablet (80 mg) by mouth 4 times daily    Slow transit constipation       tamsulosin 0.4 MG capsule    FLOMAX    90 capsule    TAKE 1 CAPSULE EVERY DAY    Incomplete bladder emptying       thiamine 100 MG tablet     90 tablet    TAKE 1 TABLET (100 MG) BY MOUTH DAILY    Cerebrovascular accident (CVA) due to embolism of right middle cerebral artery (H)       warfarin 3 MG tablet    COUMADIN    180 tablet    Take 3mgtonight and tomorrow night. Follow up with coumadin clinic on Tuesday for further dosing    Cerebrovascular accident (CVA) due to embolism of right middle cerebral artery (H)       * Notice:  This list has 11 medication(s) that are the same as other medications prescribed for you. Read the directions carefully, and ask your doctor or other care provider to review them with you.

## 2017-08-15 NOTE — Clinical Note
I ordered labs that ideally would be done the last week of August, same day as INR is fine.  Please keep the 9/12/17 follow-up.

## 2017-08-16 PROBLEM — S46.812D TRAPEZIUS STRAIN, LEFT, SUBSEQUENT ENCOUNTER: Status: ACTIVE | Noted: 2017-01-01

## 2017-08-16 NOTE — PATIENT INSTRUCTIONS
1.  We made no medication changes today.  I entered the new losartan dose of 12.5 mg daily into the computer.    2.  I ordered some labs for about two weeks from now primarily to look at kidney function.  Hopefully it improves on this lower dose of losartan.    3.  I'll be back to see you on Tuesday, September 12th at 2 PM.

## 2017-08-21 NOTE — PROGRESS NOTES
ANTICOAGULATION FOLLOW-UP CLINIC VISIT    Patient Name:  Sohan Rendon  Date:  8/21/2017  Contact Type:  Telephone    SUBJECTIVE:     Patient Findings     Positives No Problem Findings           OBJECTIVE    INR   Date Value Ref Range Status   08/21/2017 1.90  Final     Comment:     Home Care      Chromogenic Factor 10   Date Value Ref Range Status   08/10/2014 24 (L) 70 - 130 % Final     Comment:     Therapeutic Range:  A Chromogenic Factor 10 level of approximately 20-40%   inversely correlates with an INR of 2-3 for patients receiving Warfarin.   Chromogenic Factor 10 levels below 20% indicate an INR greater than 3 and   levels above 40% indicate an INR less than 2.       Factor 2 Assay   Date Value Ref Range Status   07/21/2014 25 (L) 60 - 140 % Final     Comment:     Analyte Specific Reagents (ASRs) are used in many laboratory tests necessary   for   standard medical care and generally do not require FDA approval.  This test   was   developed and its performance characteristics determined by [facility].  It has not been cleared or approved by   the US Food and Drug Administration.         ASSESSMENT / PLAN  INR assessment THER    Recheck INR In: 1 WEEK    INR Location [facility] INR      Anticoagulation Summary as of 8/21/2017     INR goal    Prior goal 1.8-2.5   Today's INR 1.90   Maintenance plan 3 mg (3 mg x 1) every day   Full instructions 3 mg every day   Weekly total 21 mg   Plan last modified Sarah Martinez, RN (8/21/2017)   Next INR check 8/28/2017   Priority INR   Target end date Indefinite    Indications   LVAD (left ventricular assist device) present [Z95.811]  Long-term (current) use of anticoagulants [Z79.01] [Z79.01]         Anticoagulation Episode Summary     INR check location     Preferred lab     [facility]    Comments Goal Range is 1.8-2.3  [facility] comes out to draw INR  Sofya Santiago  [phone]  Daughter Almaz  [phone]      Anticoagulation Care Providers     Provider Role Specialty Phone number    Evon Lemons MD Responsible Cardiology [phone]            See the Encounter Report to view Anticoagulation Flowsheet and Dosing Calendar (Go to Encounters tab in chart review, and find the Anticoagulation Therapy Visit)    Spoke with IN[facility] Cowart. Gave them new warfarin recommendation.  No changes in health, medication, or diet. No missed doses, no falls. No signs or symptoms of bleed or clotting.     Sarah Martinez RN

## 2017-08-21 NOTE — MR AVS SNAPSHOT
[facility]   8/21/2017   Anticoagulation Therapy Visit    Description:  67 year old male   Provider:  Sarah Martinez, RN   Department:  [facility]           INR as of 8/21/2017     Today's INR 1.90      Anticoagulation Summary as of 8/21/2017     INR goal    Prior goal 1.8-2.5   Today's INR 1.90   Full instructions 3 mg every day   Next INR check 8/28/2017    Indications   LVAD (left ventricular assist device) present [Z95.811]  Long-term (current) use of anticoagulants [Z79.01] [Z79.01]         August 2017 Details    Sun Mon Tue Wed Thu Fri Sat       1               2               3               4               5                 6               7               8               9               10               11               12                 13               14               15               16               17               18               19                 20               21      3 mg   See details      22      3 mg         23      3 mg         24      3 mg         25      3 mg         26      3 mg           27      3 mg         28            29               30               31                  Date Details   08/21 This INR check       Date of next INR:  8/28/2017         How to take your warfarin dose     To take:  3 mg Take 1 of the 3 mg tablets.

## 2017-08-26 NOTE — LETTER
Transition Communication Hand-off for Care Transitions to Next Level of Care Provider    Name: Sohan Rendon  MRN #: [id number]  Primary Care Provider: Thomas Dill     Primary Clinic: [address] [place] [address] [address] [place] [id number]     Reason for Hospitalization:  LVAD (left ventricular assist device) present (H) [Z95.811]  Left ventricular assist device (LVAD) complication, initial encounter [T82.9XXA]  Admit Date/Time: 8/26/2017 11:32 PM  Discharge Date: 8/29/17  Payor Source: Payor: [employer] / Plan: [employer] [employer] / Product Type: HMO   Readmission Assessment Measure (LEANDRO) Risk Score/category: high  Reason for Communication Hand-off Referral: Multiple providers/specialties  Discharge Plan:   Concern for non-adherence with plan of care:   no  Discharge Needs Assessment:  Needs       Most Recent Value    Equipment Currently Used at Home wheelchair, hospital bed, grab bar    Other Resources Other (see comment)    Transportation Agency -- [[employer]]    Home Care [facility] [phone], Fax: [phone]    Other -- [[facility]: [phone]]      Follow-up specialty is recommended: Yes    Follow-up plan:  Future Appointments  Date Time Provider Department Center   8/30/2017 11:00 AM Jordi Lopez [facility]   8/31/2017 11:00 AM Stefany Alexandra [facility]   9/1/2017 11:00 AM Jordi Lopez [facility]   9/12/2017 2:00 PM Thomas Dill MD [facility]   10/10/2017 2:00 PM Thomas Dill MD [facility]   11/14/2017 2:00 PM Thomas Dill MD [facility]   12/12/2017 2:00 PM Thomas Dill MD [facility]       Any outstanding tests or procedures:        Referrals     Future Labs/Procedures    Home care nursing referral     Comments:    [facility]  Phone: [phone]   Fax: [phone]    For RN obdulia post hospitalization.   Resumption of previous home care orders.   Assess: vital signs, respiratory and cardiac  status, activity tolerance, hydration, nutritional status.   Med set up and management.   INR lab draws; with results to the [facility].     Your provider has ordered home care nursing services. If you have not been contacted within 2 days of your discharge please call the inpatient department phone number at [phone] .    Medication Therapy Management Referral     Comments:    Reason for referral:  on more than 5 medications and managing chronic disease and on more than 10 medications and hospitalized or in the ED in the past 6 months     This service is designed to help you get the most from your medications.  A specially trained pharmacist will work closely with you and your doctors  to solve any problems related to your medications and to help you get the   best results from taking them.      The Medication Therapy Management staff will call you to schedule an appointment.            Key Recommendations:  Post hospitalization follow up.     JAYLEN KLEIN RN CC

## 2017-08-26 NOTE — IP AVS SNAPSHOT
[address] [address]    Phone:  [phone]                                       After Visit Summary   8/26/2017    Sohan Rendon    MRN: [id number]           After Visit Summary Signature Page     I have received my discharge instructions, and my questions have been answered. I have discussed any challenges I see with this plan with the nurse or doctor.    ..........................................................................................................................................  Patient/Patient Representative Signature      ..........................................................................................................................................  Patient Representative Print Name and Relationship to Patient    ..................................................               ................................................  Date                                            Time    ..........................................................................................................................................  Reviewed by Signature/Title    ...................................................              ..............................................  Date                                                            Time

## 2017-08-26 NOTE — IP AVS SNAPSHOT
MRN:[id number]                      After Visit Summary   8/26/2017    Sohan Rendon    MRN: [id number]           Thank you!     Thank you for choosing [facility] for your care. Our goal is always to provide you with excellent care. Hearing back from our patients is one way we can continue to improve our services. Please take a few minutes to complete the written survey that you may receive in the mail after you visit with us. Thank you!        Patient Information     Date Of Birth          1950        Designated Caregiver       Most Recent Value    Caregiver    Will someone help with your care after discharge? yes    Name of designated caregiver Almaz    Phone number of caregiver [phone]    Caregiver address [place]      About your hospital stay     You were admitted on:  August 27, 2017 You last received care in the:  [address] [facility]    You were discharged on:  August 29, 2017        Reason for your hospital stay       You were admitted to the hospital for dehydration.                  Who to Call     For medical emergencies, please call 911.  For non-urgent questions about your medical care, please call your primary care provider or clinic, [phone]          Attending Provider     Provider Specialty    Damaris Perez MD Emergency Medicine    [facility], Spencer Marte MD Cardiology       Primary Care Provider Office Phone # Fax #    Thomas Dill [phone]      After Care Instructions     Activity       Your activity upon discharge: activity as tolerated            Diet       Follow this diet upon discharge: low salt, low fat diet                  Follow-up Appointments     Adult [facility] Follow-up and recommended labs and tests       Follow up with [facility] clinic in 1-2 weeks for check of fluid status and diuretics     Appointments on [place] and/or [facility] (with [facility] or [facility] provider or service). Call [phone] if you haven't heard  regarding these appointments within 7 days of discharge.                  Your next 10 appointments already scheduled     Sep 12, 2017  2:00 PM CDT   Return Visit with Thomas Dill MD   [facility]    [address]  [address]  [facility] [address]   [phone]            Oct 10, 2017  2:00 PM CDT   Return Visit with Thomas Dill MD   [facility] ([facility])    [address]  [address]  [facility] [id number] [phone]            Nov 14, 2017  2:00 PM CST   Return Visit with Thomas Dill MD   [facility])    [address]  [address]  [facility] [address] [phone]            Dec 12, 2017  2:00 PM CST   Return Visit with Thomas Dill MD   [facility] ([facility])    [address]  [address]  [facility] [address] [phone]              Additional Services     Home care nursing referral       [facility]  Phone: [phone]   Fax: [phone]    For RN obdulia post hospitalization.   Resumption of previous home care orders.   Assess: vital signs, respiratory and cardiac status, activity tolerance, hydration, nutritional status.   Med set up and management.   INR lab draws; with results to the [facility]. Next INR lab draw due on 8/31/17.     Your provider has ordered home care nursing services. If you have not been contacted within 2 days of your discharge please call the inpatient department phone number at [phone] .            Medication Therapy Management Referral       Reason for referral:  on more than 5 medications and managing chronic disease and on more than 10 medications and hospitalized or in the ED in the past 6 months     This service is designed to help you get the most from your medications.  A specially trained pharmacist will work  "closely with you and your doctors  to solve any problems related to your medications and to help you get the   best results from taking them.      The Medication Therapy Management staff will call you to schedule an appointment.                  General Recommendations To Control Heart Failure When You Get Home     Instructions To Patients and Families: Please read and check off each of these important instructions as you do them when you get home.           Weight and symptoms      ___ Put a scale in your bathroom  ___ Post a weight chart or calendar next to the scale  ___Weigh yourself every day as soon as you you get up in the morning. You should only be wearing your pajamas. Write your weight on the chart/calendar.  ___ Bring your weight chart/calendar with you to all appointments    ___Call your doctor if you gain 2 pounds in 1 day or 5 pounds in 1 week from your \"dry\" weight (baseline weight). Also call your doctor if you have shortness of breath that gets worse over time, leg swelling or fatigue.         Medicines and diet     ___ Make sure to take your medicines as prescribed.    ___Bring a current list of your medicines and all of your medicine bottles with you to all appointments.    ___ Limit fluids if you still have swelling or shortness of breath, or if your doctor tells you to do so.  ___ Eat less than 2000 mg of sodium (salt) every day. Read food labels, and do not add salt to meals.   ___ Heart healthy diet with low fat and low cholesterol          Activity and suggested lifestyle changes    ___ Stay active. Talk to your doctor about an exercise program that is safe for your heart.    ___ Stop smoking. Reduce alcohol use.      ___ Lose weight if you are overweight. Extra weight puts a lot of stress on the heart.          Control for Leg Swelling   ___ Keep your legs elevated (raised) as needed for swelling. If swelling is uncomfortable or elevation doesn t help, ask your doctor about using ACE wrap " or Jobst stockings.          Follow-up appointments   ___ Make a [facility] appointment with a basic metabolic panel lab draw 3 to 5 days after you leave the hospital. Call one of the following locations:   [facility]  [phone],  [facility] [phone],  [facility]  [phone].     ___ Make sure to take your medications as prescribed and bring an accurate list of your medications and your weight chart/calendar to your follow up appointment at the [facility] Clinic for continued education and adjustments          What is the C[facility] clinic?    The [facility].O.R.E (Cardiomyopathy, Optimization, Rehabilitation, Education) Clinic is a heart failure specialty clinic within the [facility]. At [facility].O.R.E[facility] you will work with nurse practitioners to carefully adjust medicines, get education and learn who and when to call if symptoms appear. [facility] nurses specialize in helping you:    better understand your disease.    slow the progress of your disease.    improve the length and quality of your life.    detect future heart problems before they become life threatening.    avoid hospital stays.            Pending Results     No orders found from 8/24/2017 to 8/27/2017.            Statement of Approval     Ordered          08/29/17 1345  I have reviewed and agree with all the recommendations and orders detailed in this document.  EFFECTIVE NOW     Approved and electronically signed by:  Ruben Sevilla MD             Admission Information     Date & Time Provider Department Dept. Phone    8/26/2017 Spencer Phipps MD [address] [facility] [phone]      Your Vitals Were     Blood Pressure Pulse Temperature Respirations Weight Pulse Oximetry    106/82 (BP Location: Right arm) 58 97.8  F (36.6  C) (Oral) 16 88 kg (194 lb 0.1 oz) 100%    BMI (Body Mass Index)                   29.5 kg/m2          "  MyChart Information     [facility] [employer] lets you send messages to your doctor, view your test results, renew your prescriptions, schedule appointments and more. To sign up, go to [email or web address] . Click on \"Log in\" on the left side of the screen, which will take you to the Welcome page. Then click on \"Sign up Now\" on the right side of the page.     You will be asked to enter the access code listed below, as well as some personal information. Please follow the directions to create your username and password.     Your access code is: [email or web address]  Expires: 2017  8:55 AM     Your access code will  in 90 days. If you need help or a new code, please call your [facility] clinic or [phone].        Care EveryWhere ID     This is your Care EveryWhere ID. This could be used by other organizations to access your [facility] medical records  [id number]        Equal Access to Services     ALCON SKINNER : Hadalee Rahman, walinnda tamiko, qaybta kaalmaavery rose, willie dorman . So [facility] [phone].    ATENCIÓN: Si mel villagomez, tiene a smith disposición servicios gratuitos de asistencia lingüística. Llame al [phone].    We comply with applicable federal civil rights laws and [place] laws. We do not discriminate on the basis of race, color, national origin, age, disability sex, sexual orientation or gender identity.               Review of your medicines      CONTINUE these medicines which may have CHANGED, or have new prescriptions. If we are uncertain of the size of tablets/capsules you have at home, strength may be listed as something that might have changed.        Dose / Directions    acetaminophen 325 MG tablet   Commonly known as:  TYLENOL   This may have changed:    - when to take this  - reasons to take this   Used for:  Femoral neck fracture, right, closed, initial encounter        Dose:  650 mg   Take 2 tablets (650 mg) by mouth every 6 hours   Quantity:  " 100 tablet   Refills:  0       loratadine 10 MG tablet   Commonly known as:  CLARITIN   This may have changed:    - when to take this  - reasons to take this   Used for:  Allergic rhinitis, unspecified allergic rhinitis type        Dose:  10 mg   Take 1 tablet (10 mg) by mouth daily   Quantity:  90 tablet   Refills:  3       simethicone 80 MG chewable tablet   Commonly known as:  MYLICON   This may have changed:    - when to take this  - reasons to take this   Used for:  Slow transit constipation        Dose:  80 mg   Take 1 tablet (80 mg) by mouth 4 times daily   Quantity:  180 tablet   Refills:  5       warfarin 3 MG tablet   Commonly known as:  COUMADIN   This may have changed:    - how much to take  - how to take this  - when to take this  - additional instructions   Used for:  Cerebrovascular accident (CVA) due to embolism of right middle cerebral artery (H)        Take 3mgtonight and tomorrow night. 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, unspecified site        Dose:  100 mg   Take 1 tablet (100 mg) by mouth daily   Quantity:  90 tablet   Refills:  3       ASPIRIN NOT PRESCRIBED   Commonly known as:  INTENTIONAL   Used for:  LVAD (left ventricular assist device) present (H)        Please choose reason not prescribed, below   Quantity:  0 each   Refills:  0       atorvastatin 10 MG tablet   Commonly known as:  LIPITOR   Used for:  Hyperlipidemia LDL goal <100        TAKE 1 TABLET (10 MG) BY MOUTH DAILY   Quantity:  90 tablet   Refills:  3       ALEK CONTOUR test strip   Used for:  Hypoglycemia   Generic drug:  blood glucose monitoring        USE TO TEST BLOOD SUGAR 2 TIMES DAILY OR AS DIRECTED.   Quantity:  100 strip   Refills:  1       bisacodyl 10 MG  Suppository   Commonly known as:  DULCOLAX   Used for:  Drug-induced constipation        Dose:  10 mg   Place 1 suppository (10 mg) rectally daily as needed for constipation   Quantity:  5 suppository   Refills:  1       blood glucose monitoring lancets   Used for:  Diabetes mellitus, type 2 (H)        Use to test blood sugars 2 times daily or as directed.   Quantity:  1 Box   Refills:  3       calcium carbonate-vitamin D 500-400 MG-UNIT Tabs per tablet   Used for:  Cardiac arrhythmia, unspecified cardiac arrhythmia type        Dose:  1 tablet   Take 1 tablet by mouth daily   Quantity:  90 tablet   Refills:  3       finasteride 5 MG tablet   Commonly known as:  PROSCAR   Used for:  Incomplete bladder emptying        Dose:  5 mg   Take 1 tablet (5 mg) by mouth daily   Quantity:  90 tablet   Refills:  3       furosemide 20 MG tablet   Commonly known as:  LASIX   Used for:  Chronic systolic congestive heart failure (H)        Dose:  20 mg   Take 1 tablet (20 mg) by mouth as needed   Quantity:  20 tablet   Refills:  11       ketotifen 0.025 % Soln ophthalmic solution   Commonly known as:  ZADITOR/REFRESH ANTI-ITCH   Used for:  Allergic conjunctivitis, bilateral        Dose:  1 drop   Place 1 drop into both eyes 2 times daily   Quantity:  1 Bottle   Refills:  11       levETIRAcetam 750 MG tablet   Commonly known as:  KEPPRA   Used for:  Convulsions, unspecified convulsion type (H)        TAKE 1 TABLET (750 MG) BY MOUTH 2 TIMES DAILY   Quantity:  180 tablet   Refills:  3       losartan 25 MG tablet   Commonly known as:  COZAAR   Used for:  CHF (congestive heart failure), NYHA class IV, chronic, systolic (H)        Dose:  12.5 mg   Take 0.5 tablets (12.5 mg) by mouth daily   Quantity:  45 tablet   Refills:  3       magnesium oxide 400 MG tablet   Commonly known as:  MAG-OX   Used for:  Abdominal distention, Drug-induced constipation        Dose:  400 mg   Take 1 tablet (400 mg) by mouth 2 times daily   Quantity:  14 tablet    Refills:  0       melatonin 3 MG tablet   Used for:  Insomnia, unspecified type        Dose:  3 mg   Take 1 tablet (3 mg) by mouth At Bedtime   Refills:  0       nitroGLYcerin 0.4 MG sublingual tablet   Commonly known as:  NITROSTAT   Used for:  Coronary artery disease involving native coronary artery with unstable angina pectoris (H)        Dose:  0.4 mg   Place 1 tablet (0.4 mg) under the tongue every 5 minutes as needed for chest pain   Quantity:  30 tablet   Refills:  1       nystatin 859876 UNIT/GM Powd   Commonly known as:  MYCOSTATIN        Apply to affected areas of skin in the groin and on the scrotum three times a day as needed.   Quantity:  60 g   Refills:  3       * order for DME   Used for:  Fracture of femoral neck, right, closed, initial encounter, Stroke (H), CHF (congestive heart failure), NYHA class IV (H)        Equipment being ordered: Lift chair.  Please see indications and face-to-face encounter details in 2/3/15 Office Visit note.   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   Quantity:  1 each   Refills:  0       * order for DME   Used for:  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), Displaced fracture of right femoral neck, closed, with nonunion, subsequent encounter        Equipment being ordered: Wheelchair, manual, with elevated leg rests and tilt in space back.  Please fax to [facility]; I called them 11/26/16 and they requested the new order.  Face to face is in my 10/26/16 note.   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, subsequent encounter        Dose:  5 mg   Take 1 tablet (5 mg) by mouth every 4 hours as needed for moderate to severe pain   Quantity:  30 tablet   Refills:  0       pantoprazole 40 MG EC tablet   Commonly known as:  PROTONIX   Used for:  Gastrointestinal hemorrhage associated with chronic superficial gastritis        Dose:  40 mg   Take 1 tablet (40 mg) by mouth daily   Quantity:  180 tablet   Refills:  3       SENEXON-S 8.6-50 MG per tablet   Used for:  Slow transit constipation   Generic drug:  senna-docusate        TAKE 1-2 TABLETS BY MOUTH 2 TIMES DAILY AS NEEDED FOR CONSTIPATION   Quantity:  60 tablet   Refills:  3       tamsulosin 0.4 MG capsule   Commonly known as:  FLOMAX   Used for:  Incomplete bladder emptying        TAKE 1 CAPSULE EVERY DAY   Quantity:  90 capsule   Refills:  3       thiamine 100 MG tablet   Used for:  Cerebrovascular accident (CVA) due to embolism of right middle cerebral  artery (H)        TAKE 1 TABLET (100 MG) BY MOUTH DAILY   Quantity:  90 tablet   Refills:  3       TOPROL XL PO        Take 50mg by mouth every morning. Take 25mg by mouth every evening.   Refills:  0       * Notice:  This list has 7 medication(s) that are the same as other medications prescribed for you. Read the directions carefully, and ask your doctor or other care provider to review them with you.             Protect others around you: Learn how to safely use, store and throw away your medicines at www.disposemymeds.org.             Medication List: This is a list of all your medications and when to take them. Check marks below indicate your daily home schedule. Keep this list as a reference.      Medications           Morning Afternoon Evening Bedtime As Needed    acetaminophen 325 MG tablet   Commonly known as:  TYLENOL   Take 2 tablets (650 mg) by mouth every 6 hours   Last time this was given:  650 mg on 8/29/2017  8:14 AM                                            allopurinol 100 MG tablet   Commonly known as:  ZYLOPRIM   Take 1 tablet (100 mg) by mouth daily   Last time this was given:  100 mg on 8/29/2017  8:15 AM                                   ASPIRIN NOT PRESCRIBED   Commonly known as:  INTENTIONAL   Please choose reason not prescribed, below                                atorvastatin 10 MG tablet   Commonly known as:  LIPITOR   TAKE 1 TABLET (10 MG) BY MOUTH DAILY   Last time this was given:  10 mg on 8/29/2017  8:16 AM                                   ALEK CONTOUR test strip   USE TO TEST BLOOD SUGAR 2 TIMES DAILY OR AS DIRECTED.   Generic drug:  blood glucose monitoring            Before breakfast           Before supper               bisacodyl 10 MG Suppository   Commonly known as:  DULCOLAX   Place 1 suppository (10 mg) rectally daily as needed for constipation   Last time this was given:  10 mg on 8/29/2017  2:15 PM                                   blood glucose monitoring lancets   Use to  test blood sugars 2 times daily or as directed.                                      calcium carbonate-vitamin D 500-400 MG-UNIT Tabs per tablet   Take 1 tablet by mouth daily                                   finasteride 5 MG tablet   Commonly known as:  PROSCAR   Take 1 tablet (5 mg) by mouth daily   Last time this was given:  5 mg on 8/29/2017  8:19 AM                                   furosemide 20 MG tablet   Commonly known as:  LASIX   Take 1 tablet (20 mg) by mouth as needed                                   ketotifen 0.025 % Soln ophthalmic solution   Commonly known as:  ZADITOR/REFRESH ANTI-ITCH   Place 1 drop into both eyes 2 times daily   Last time this was given:  1 drop on 8/29/2017  8:13 AM                                      levETIRAcetam 750 MG tablet   Commonly known as:  KEPPRA   TAKE 1 TABLET (750 MG) BY MOUTH 2 TIMES DAILY   Last time this was given:  750 mg on 8/29/2017  8:14 AM                                      loratadine 10 MG tablet   Commonly known as:  CLARITIN   Take 1 tablet (10 mg) by mouth daily   Last time this was given:  10 mg on 8/29/2017  8:16 AM                                   losartan 25 MG tablet   Commonly known as:  COZAAR   Take 0.5 tablets (12.5 mg) by mouth daily   Last time this was given:  12.5 mg on 8/29/2017  8:19 AM                                   magnesium oxide 400 MG tablet   Commonly known as:  MAG-OX   Take 1 tablet (400 mg) by mouth 2 times daily   Last time this was given:  400 mg on 8/29/2017  8:27 AM                                      melatonin 3 MG tablet   Take 1 tablet (3 mg) by mouth At Bedtime   Last time this was given:  3 mg on 8/28/2017 10:43 PM                                   nitroGLYcerin 0.4 MG sublingual tablet   Commonly known as:  NITROSTAT   Place 1 tablet (0.4 mg) under the tongue every 5 minutes as needed for chest pain                                   nystatin [id number] UNIT/GM Powd   Commonly known as:  MYCOSTATIN   Apply to  affected areas of skin in the groin and on the scrotum three times a day as needed.                                   * order for DME   Equipment being ordered: Lift chair.  Please see indications and face-to-face encounter details in 2/3/15 Office Visit note.                                * order for DME   Equipment being ordered: Bilateral leg supports for manual wheelchair.                                * order for DME   Equipment being ordered: Reclining manual w/c with bilateral elevating leg rests.                                * order for DME   Equipment being ordered: Hospital Bed, fully electric.                                * order for DME   Equipment being ordered: Wheelchair, manual.                                * order for DME   Equipment being ordered: Wheelchair, manual, with elevated leg rests and tilt in space back.  Please fax to [email or web address]; I called them 11/26/16 and they requested the new order.  Face to face is in my 10/26/16 note.                                * order for DME   Equipment being ordered: Cushioned heel boots.                                oxyCODONE 5 MG IR tablet   Commonly known as:  ROXICODONE   Take 1 tablet (5 mg) by mouth every 4 hours as needed for moderate to severe pain                                   pantoprazole 40 MG EC tablet   Commonly known as:  PROTONIX   Take 1 tablet (40 mg) by mouth daily   Last time this was given:  40 mg on 8/29/2017  8:16 AM                                   SENEXON-S 8.6-50 MG per tablet   TAKE 1-2 TABLETS BY MOUTH 2 TIMES DAILY AS NEEDED FOR CONSTIPATION   Last time this was given:  1 tablet on 8/28/2017  6:26 PM   Generic drug:  senna-docusate                                   simethicone 80 MG chewable tablet   Commonly known as:  MYLICON   Take 1 tablet (80 mg) by mouth 4 times daily   Last time this was given:  80 mg on 8/29/2017 11:37 AM                                            tamsulosin 0.4 MG capsule   Commonly known as:   FLOMAX   TAKE 1 CAPSULE EVERY DAY   Last time this was given:  0.4 mg on 8/29/2017  8:14 AM                                   thiamine 100 MG tablet   TAKE 1 TABLET (100 MG) BY MOUTH DAILY   Last time this was given:  100 mg on 8/29/2017  8:16 AM                                   TOPROL XL PO   Take 50mg by mouth every morning. Take 25mg by mouth every evening.   Last time this was given:  50 mg on 8/29/2017  8:15 AM            50mg           25 mg               warfarin 3 MG tablet   Commonly known as:  COUMADIN   Take 3mgtonight and tomorrow night. Follow up with coumadin clinic on Tuesday for further dosing   Last time this was given:  2 mg on 8/28/2017  5:58 PM                 3mg 8/29 & 8/30               * Notice:  This list has 7 medication(s) that are the same as other medications prescribed for you. Read the directions carefully, and ask your doctor or other care provider to review them with you.

## 2017-08-27 NOTE — H&P
Cardiology    History and Physical         Date of Admission:  8/26/2017  Date of Service (when I saw the patient): 08/27/17    Assessment & Plan   Sohan Rendon is a 67 year old male with severe ICM s/p HM II LVAD as DT c/b recurrent device thrombi, recurrent hemolysis and multiple embolic CVAs, recurrent GI/urogenital bleeding (goal INR 1.8 - 2.5), CKD Stage III, HTN, latent TB, and recurrent UTIs who was brought in to the ED by his daughter after it was noticed, Pt's PIs on his LVAD monitor were <2, when they are usually 3-5, and Pt was found to have several hours worth of LVAD speeds between 2646-4290, flows between 10.3-11.7, naranjo between 9-10, and PIs between 1.9-3.4. Pt's numbers have all been stable since he has been in the ED.     # LVAD with Low PIs, High Flows and Naranjo  # ICM s/p HM II LVAD as DT c/b Recurrent Device Thrombi, Recurrent Hemolysis, GI/Urogenital Bleeding, and Multiple Embolic CVAs, NYHA Class IIIB, ACC/AHA Stage D  HD stable and asymptomatic. Started having low PIs <2, high flows >10, and high naranjo >9 around ~7PM on 8/26. This pattern last approx 2+ hours. During this time, Pt was completely asymptomatic. All numbers returned to wnl on arrival to the ED without any interventions, and have remained wnl since. Most likely pump thrombi (give extensive hx of device clots) vs other form of obstruction of inflow cannula vs less likely vasodilation vs hypotension given improved without interventions. Other possible etiology is driveline malfunction, but again given transient nature seems less likely. LDH baseline recently 990-1440, with most recently 1412 at clinic appt on 8/9, 854 on admission. Appears euvolemic on exam. Baseline weight around 180, was 179 in the ED, then 194 on repeat weight in a different bed. NT-pro  on admission.  - Started low int hep gtt in the ED  - Pt not on ASA given extensive bleeding hx (no recent bleeds in last several months)  - Placed warfarin consult  for Pharmacy to dose, INR goal 1.8-2.5, day team to determine if this will need to be held while on hep gtt  - Continue to monitor LDH daily (rise could be delayed)  - Continue PTA losartan 12.5 mg daily, metoprolol XL 50 mg in AM and 25 mg in PM  - No diuresis at this time, Pt only takes 20 mg lasix PRN, has not needed any this last week, monitor volume status closely  - Daily weights, strict I/O  - TTE in AM to assess for any malpositioning or signs of inflow obstruction  - Consider Thoratec driveline interrogation  - Continue to monitor device parameters    ## Chronic Medical Conditions  # CKD Stage III: Cr at baseline ~1.7-2.1, 1.87 on admission, will continue to monitor  # HTN: continue PTA medications as above  # Chronic Normocytic Anemia: Extensive hx of bleeding, baseline Hb 9-10, 9.5 on admission, no active signs of bleeding, will continue to monitor  # Seizures s/p Multiple CVAs: continue PTA keppra 750 mg BID  # BPH: continue PTA tamsulosin and finasteride  # HLD: continue PTA atorvastatin  # Gout: no signs of active flare, continue PTA allopurinol  # GERD: continue PTA pantoprazole  # Insomnia: continue PTA melatonin  # Chronic Pain Syndrome: continue PTA oxycodone PRN  # Allergic Rhinitis: continue PTA loratidine    FEN: no IVFs, replete lytes PRN, Cardiac diet  DVT Prophylaxis: Heparin gtt, INR therapeutic on warfarin  Code Status: Full Code (discussed with Pt and his daughter at bedside on admission)  Disposition: Admitted to [facility] for concern for low PIs and high naranjo and flows on LVAD.    Pt will be formally staffed in the AM.    Doris Montiel MD  PGY-3, Internal Medicine  [phone]    Primary Care Physician   Thomas Dill    Chief Complaint   Low PIs with high naranjo and flows on LVAD    History is obtained from the patient and his daughter at bedside.    History of Present Illness   Sohan Rendon is a 67 year old male with severe ICM s/p HM II LVAD as DT c/b recurrent device  thrombi, recurrent hemolysis and multiple embolic CVAs, recurrent GI/urogenital bleeding (goal INR 1.8 - 2.5), CKD Stage III, HTN, latent TB, and recurrent UTIs who was brought in to the ED by his daughter after it was noticed, Pt's PIs on his LVAD monitor were <2, when they are usually 3-5. Pt's daughter reports Pt is bedbound and she is his primary caregiver. There have been no changes recently in Pt's condition. She does not weigh him because he his bedbound, but has had his normal urinary amounts with no signs of edema. Pt denies any fevers, chills, chest pain, palpitations, cough, shortness of breath, abdominal pain, n/v/d/c, or new weakness. Pt is chronically weak all over 2/2 multiple CVAs, but no new symptoms recently. Pt's daughter changes his driveline site, and denies any new drainage or erythema. Pt has been eating and drinking his usual amount, but this is always a low amount.     On device interrogation in the ED, Pt's flow was 4.1, Speed 8800, PI 3.3, and Power 5.4 of his HM II. Pt was hemodynamically stable on arrival. After review of the LVAD recent hx on the monitor. Pt started having brief episodes of speeds dropping to ~8500 around 7PM on 8/26. Then from around 8PM to 10PM, Pt's LVAD speeds were between 5859-3567, flows between 10.3-11.7, naranjo between 9-10, and PIs between 1.9-3.4. Pt's numbers have all been stable since he has been in the ED.    Past Medical History    I have reviewed this patient's medical history and updated it with pertinent information if needed.   Past Medical History:   Diagnosis Date     Acute right MCA stroke (H) 6/2013    With L sided hemiparesis      Anemia of chronic disease     Baseline Hb 8-9     BPH (benign prostatic hyperplasia)      CHF (congestive heart failure), NYHA class IV (H) 10/9/2012    s/p HeartMate II.  Was on milrinone and dobutamine prior to LVAD      CKD (chronic kidney disease)     Baseline Cr=     Clostridium difficile colitis 12/2012       Dysphagia     PEG tube placed in 2012.  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  Normal carotid duplex 3/2014.       Esophagitis 12/2012    EGD with esophagitis and multiple douenal ulcers     Fracture of femoral neck, right, closed (H) 2/3/2015    Presumed pathologic as patient is non-weight-bearing and suffered no trauma.  Family declined operative repair during hospital stay 1/23 - 1/30/15.     Gastric and duodenal angiodysplasia with hemorrhage 6/18/2015     GERD (gastroesophageal reflux disease)      Gout      HTN (hypertension)     LDL=59 (3/29/2014)     Hyperlipaemia      Ischemic cardiomyopathy      Mitral regurgitation     s/p MVR with Carbomedics ring      Myocardial infarction (H) 1998    In [facility] without intervention      Nonsenile cataract     BE     PFO (patent foramen ovale)     s/p closure (10/9/2012)     TB lung, latent     s/p 9 months INH in 2012        Past Surgical History   I have reviewed this patient's surgical history and updated it with pertinent information if needed.  Past Surgical History:   Procedure Laterality Date     C CABG, VEIN, FIVE  2001     CATARACT IOL, RT/LT Right 11/19/2015     ENDOSCOPIC RETROGRADE CHOLANGIOPANCREATOGRAM N/A 5/9/2017    Procedure: COMBINED ENDOSCOPIC RETROGRADE CHOLANGIOPANCREATOGRAPHY, PLACE TUBE/STENT;  Endoscopic Retrograde Cholangiopancreatogram, Stent (Zimmon Biliary 7fr 12cm) Placement;  Surgeon: Cristo Grove MD;  Location: [facility] OR     ESOPHAGOSCOPY, GASTROSCOPY, DUODENOSCOPY (EGD), COMBINED N/A 6/10/2015    Procedure: COMBINED ESOPHAGOSCOPY, GASTROSCOPY, DUODENOSCOPY (EGD);  Surgeon: Edu Jenkins MD;  Location: [facility] GI     ESOPHAGOSCOPY, GASTROSCOPY, DUODENOSCOPY (EGD), COMBINED N/A 6/15/2015    Procedure: COMBINED ESOPHAGOSCOPY, GASTROSCOPY, DUODENOSCOPY (EGD);  Surgeon: Yury Bonner MD;  Location: [facility] OR     H INSERT ICD  2/10/2011     And pacemaker.  Not BiV     INSERT VENTRICULAR ASSIST DEVICE LEFT (HEARTMATE II)  10/9/2012     IR GASTRO JEJUNOSTOMY TUBE PLACEMENT       PHACOEMULSIFICATION CLEAR CORNEA WITH STANDARD INTRAOCULAR LENS IMPLANT Right 11/19/2015    Procedure: PHACOEMULSIFICATION CLEAR CORNEA WITH STANDARD INTRAOCULAR LENS IMPLANT;  Surgeon: Violeta Cosme MD;  Location: [facility] OR     REPAIR PATENT FORAMEN OVALE  10/9/2012     REPAIR VALVE MITRAL  2/9/2012    28 mm Carbomedics 2/3 ring        Prior to Admission Medications   Prior to Admission Medications   Prescriptions Last Dose Informant Patient Reported? Taking?   ASPIRIN NOT PRESCRIBED (INTENTIONAL)   Yes No   Sig: Please choose reason not prescribed, below   ALEK CONTOUR test strip   No No   Sig: USE TO TEST BLOOD SUGAR 2 TIMES DAILY OR AS DIRECTED.   ORDER FOR DME   No No   Sig: Equipment being ordered: Lift chair.    Please see indications and face-to-face encounter details in 2/3/15 Office Visit note.   SENEXON-S 8.6-50 MG per tablet   No No   Sig: TAKE 1-2 TABLETS BY MOUTH 2 TIMES DAILY AS NEEDED FOR CONSTIPATION   Thiamine HCl (VITAMIN B-1) 100 MG TABS   No No   Sig: TAKE 1 TABLET (100 MG) BY MOUTH DAILY   acetaminophen (TYLENOL) 325 MG tablet  Daughter No No   Sig: Take 2 tablets (650 mg) by mouth every 6 hours   Patient taking differently: Take 650 mg by mouth as needed    allopurinol (ZYLOPRIM) 100 MG tablet  Pharmacy No No   Sig: Take 1 tablet (100 mg) by mouth daily   atorvastatin (LIPITOR) 10 MG tablet   No No   Sig: TAKE 1 TABLET (10 MG) BY MOUTH DAILY   bisacodyl (DULCOLAX) 10 MG Suppository   No No   Sig: Place 1 suppository (10 mg) rectally daily as needed for constipation   bisacodyl (DULCOLAX) 5 MG EC tablet   No No   Sig: Take 1 tablet (5 mg) by mouth daily as needed for constipation   blood glucose monitoring (NO BRAND SPECIFIED) lancets   No No   Sig: Use to test blood sugars 2 times daily or as directed.   calcium carbonate-vitamin D 500-400 MG-UNIT TABS per  tablet   No No   Sig: Take 1 tablet by mouth daily   finasteride (PROSCAR) 5 MG tablet   No No   Sig: Take 1 tablet (5 mg) by mouth daily   furosemide (LASIX) 20 MG tablet   No No   Sig: Take 1 tablet (20 mg) by mouth as needed   ketotifen (ZADITOR/REFRESH ANTI-ITCH) 0.025 % SOLN ophthalmic solution   No No   Sig: Place 1 drop into both eyes 2 times daily   levETIRAcetam (KEPPRA) 750 MG tablet   No No   Sig: TAKE 1 TABLET (750 MG) BY MOUTH 2 TIMES DAILY   loratadine (CLARITIN) 10 MG tablet  Daughter No No   Sig: Take 1 tablet (10 mg) by mouth daily   losartan (COZAAR) 25 MG tablet   No No   Sig: Take 0.5 tablets (12.5 mg) by mouth daily   magnesium oxide (MAG-OX) 400 MG tablet   No No   Sig: Take 1 tablet (400 mg) by mouth 2 times daily   melatonin 3 MG tablet   Yes No   Sig: Take 1 tablet (3 mg) by mouth At Bedtime   metoprolol (TOPROL-XL) 25 MG 24 hr tablet   No No   Sig: TAKE 1 TABLET (25 MG) BY MOUTH EVERY EVENING   metoprolol (TOPROL-XL) 50 MG 24 hr tablet   No No   Sig: TAKE 1 TABLET (50 MG) BY MOUTH DAILY   nitroglycerin (NITROSTAT) 0.4 MG SL tablet  Daughter No No   Sig: Place 1 tablet (0.4 mg) under the tongue every 5 minutes as needed for chest pain   nystatin (MYCOSTATIN) 632331 UNIT/GM POWD  Daughter No No   Sig: Apply to affected areas of skin in the groin and on the scrotum three times a day as needed.   order for DME   No No   Sig: Equipment being ordered: Bilateral leg supports for manual wheelchair.   order for DME   No No   Sig: Equipment being ordered: Reclining manual w/c with bilateral elevating leg rests.   order for DME   No No   Sig: Equipment being ordered: Hospital Bed, fully electric.   order for DME   No No   Sig: Equipment being ordered: Wheelchair, manual.   order for DME   No No   Sig: Equipment being ordered: Wheelchair, manual, with elevated leg rests and tilt in space back.  Please fax to [facility]; I called them 11/26/16 and they requested the new order.  Face to face is in my  10/26/16 note.   order for DME   No No   Sig: Equipment being ordered: Cushioned heel boots.   oxyCODONE (ROXICODONE) 5 MG IR tablet   No No   Sig: Take 1 tablet (5 mg) by mouth every 4 hours as needed for moderate to severe pain   pantoprazole (PROTONIX) 40 MG enteric coated tablet  Pharmacy No No   Sig: Take 1 tablet (40 mg) by mouth daily   simethicone (MYLICON) 80 MG chewable tablet   No No   Sig: Take 1 tablet (80 mg) by mouth 4 times daily   tamsulosin (FLOMAX) 0.4 MG capsule   No No   Sig: TAKE 1 CAPSULE EVERY DAY   warfarin (COUMADIN) 3 MG tablet   No No   Sig: Take 3mgtonight and tomorrow night. Follow up with coumadin clinic on Tuesday for further dosing      Facility-Administered Medications: None     Allergies   Allergies   Allergen Reactions     Seasonal Allergies        Social History   I have reviewed this patient's social history and updated it with pertinent information if needed. Sohan Rendon  reports that he has quit smoking. He has quit using smokeless tobacco. He reports that he does not drink alcohol or use illicit drugs.    Family History   I have reviewed this patient's family history and updated it with pertinent information if needed.   Family History   Problem Relation Age of Onset     Family History Negative No family hx of      Glaucoma No family hx of      Macular Degeneration No family hx of      CANCER No family hx of      no skin cancer       Review of Systems   The 10 point Review of Systems is negative other than noted in the HPI.    Physical Exam   Temp: 97.6  F (36.4  C) Temp src: Oral BP: (!) 89/45 Pulse: 60     SpO2: 97 % O2 Device: None (Room air)    Vital Signs with Ranges  Temp:  [97.6  F (36.4  C)] 97.6  F (36.4  C)  Pulse:  [60] 60  BP: (83-89)/(45-69) 89/45  SpO2:  [97 %-100 %] 97 %  179 lbs 14.33 oz  GEN:  Alert, interactive, appears comfortable, NAD, speaks some English  HEENT: normocephalic/atraumatic, no scleral icterus, no nasal discharge, OP clear with MMM  CV: LVAD  hum, JVD just above clavicle at 45 degrees  LUNGS: Clear to auscultation bilaterally, no wheezes or crackles  ABD: Active bowel sounds, soft, non-tender/non-distended, no rebound/guarding/rigidity  EXT: Trace peripheral edema, no cyanosis, hands/feet warm to touch with good signs of peripheral perfusion  SKIN: dry to touch, no exanthems noted in the visualized areas  NEURO: alert and oriented to name, place, and event, no new focal deficits appreciated  PSCYH: normal mood and affect    Data   Data reviewed today:  I personally reviewed:      Recent Labs  Lab 08/27/17  0021 08/21/17   WBC 7.1  --    HGB 9.5*  --    MCV 93  --      --    INR 2.09* 1.90     --    POTASSIUM 3.6  --    CHLORIDE 109  --    CO2 24  --    BUN 18  --    CR 1.87*  --    ANIONGAP 7  --    JOSÉ 8.1*  --    *  --    ALBUMIN 3.0*  --    PROTTOTAL 7.2  --    BILITOTAL 0.6  --    ALKPHOS 126  --    ALT 16  --    AST 36  --        No results found for this or any previous visit (from the past 24 hour(s)).    I personally saw and examined this patient and issue is of whether this represent suction even or further clotting within the valve.  I reviewed images and believe that he is volume down.

## 2017-08-27 NOTE — ED NOTES
Pt arrived by ambulance after he was referred by the LVAD coordinator for low flow pump settings. He has no complaints. Switched to base unit on arrival

## 2017-08-27 NOTE — PROGRESS NOTES
CARDIOLOGY PROGRESS NOTE    SUBJECTIVE:  Mr. Rendon feels well this morning. No visible bleeding, pain, or dyspnea.     Hx obtained through daughter, who was at bedside and translated Thai for us     ROS otherwise negative.    OBJECTIVE:  Vital signs:  81/73 (Range   58 (HR 54-60)   14 (RR 12-27)  97.6 (Tm 97.6)  194 lbs .08 oz (Weight 08/26 179)    I/O: 68 mL positive since admission      PHYSICAL EXAM:  Gen: No distress  HEENT: No scleral icterus   Resp: No signs of resp distress, CTAB  CVS: Quiet HS obscured by LVAD hum  Abdo: ND, S, NT   Extremities: Warm, well-perfused,   Neuro: GCS [id number] according to daughter (speech)    Recent Labs   Lab Test  08/27/17   0621  08/27/17   0021   HGB   --   9.5*   HCT   --   30.5*   WBC   --   7.1   MCV   --   93   MCH   --   28.9   MCHC   --   31.1*   RDW   --   18.5*   PLT   --   200   NA  140  139   POTASSIUM  3.8  3.6   CHLORIDE  109  109   CO2  22  24   BUN  18  18   CR  1.85*  1.87*   GLC  109*  143*   JOSÉ  8.2*  8.1*   ALBUMIN   --   3.0*   BILITOTAL   --   0.6   ALKPHOS   --   126   AST   --   36   ALT   --   16        Haptoglobin   Anti-10A 0.45     Micro:  Nil    Imaging:  Nil     Cardiac meds: Atorvastatin 10 mg q24h, losartan 12.5 mg q24h, metoprolol succinate 50/25 mg q24h, warfarin   Non-cardiac meds: Allopruinol, calcium carbonate, finasteride, levetiracetam, loratadine, magnesium oxide, melatonin, pantoprazole, simethicone, tamsulosin, thiamine  Drips: Heparin low intensity     ASSESSMENT/PLAN:  Mr. Sohan Rendon is a 67-year old male with a PMHx of ICM s/p HM2 LVAD placement (c/b multiple embolic strokes and GI bleeding episodes), CKD 3 and HTN who presented for evaluation of LVAD parameters suspicious for repeat LVAD pump thrombosis (low PIs, high flows, power spikes).    - ICM s/p LVAD (c/b prior GI/ bleeding requiring transfusion, strokes)   - Continue warfarin with heparin low-intensity infusion (low due to propensity to bleeding) with INR goal  1.8-2.5    - Daily LDH   - Continue metoprolol succinate 50/25 regimen, losartan 12.5 mg q24h   - Review TTE with Imaging team to determine whether LV outflow cannula is approximated to septum vs. appropriately positioned     Chronic/inactive issues:  - CKD 3: Baseline Cr 1.7-2.1   - HTN: Med regimen as above  - Chronic normocytic anemia: monitor   - Dyslipidemia: atorvastatin  - Gout, insomnia, chronic pain syndrome, allergic rhinitis: continue home loratadine, melatonin, allopurinol    Seen and staffed with Dr. Phipps.    Ruben Sevilla  Cardiology Fellow  Pager [phone]            I personally provided care for this patient, reviewed chart, discussed course with patient, housestaff and consulting physicians.  I answered all questions.    Spencer Phipps M.D.  Division of Cardiology  Department of Medicine

## 2017-08-27 NOTE — ED NOTES
Bed: IN05  Expected date:   Expected time:   Means of arrival: Ambulance  Comments:  Sohan Rendon  LVAD Pt w/ chronic thrombus  LVAD settings: Flow, power, and PI elevated  Will admit for heparin drip per device nurse and cardiology

## 2017-08-27 NOTE — PHARMACY-ADMISSION MEDICATION HISTORY
"Admission medication history interview status for the 8/26/2017 admission is complete. See Epic admission navigator for allergy information, pharmacy, prior to admission medications and immunization status.     Medication history interview sources:  Patient's daughter    Changes made to PTA medication list (reason)  Added: N/A (per daughter)  Deleted: Bisacodyl tablet (per daughter; taking senna-docusate tablets instead)  Changed:   -Acetaminophen tablets: Take 650mg by mouth every 6 hours --> Take 650mg by mouth as needed. (per daughter)  -Loratadine tablets: Take 10mg by mouth daily --> Take 10mg by mouth as needed. (per daughter)  -Simethicone chewable tablets: Take 80mg by mouth 4 times daily --> Take 80mg by mouth as needed. (Per daughter, pt usually takes once to twice daily.)  -Warfarin tablets: Take 3mg tonight and tomorrow night. Follow up with coumadin clinic on Tuesday for further dosing. --> Take 3mg by mouth daily. (Per daughter, pt has been taking 3mg since 8/21; pt was scheduled for INR check tomorrow.)    Additional medication history information (including reliability of information, actions taken by pharmacist):  -Pt's daughter was able to confirm dosing regimens, last doses taken. Pt was asleep during interview.  -Losartan tablets: Daughter reported that pt was recently decreased by physician from 25mg to 12.5mg due to kidney issues; per daughter, pt was \"on losartan too long.\"  -Senna-docusate tablets: Per daughter, this is first choice to address constipation. If this medication does not work, bisacodyl suppository used as second option.  -Pt's daughter denied any other prescribed medications - including inhalers, antibiotics, topical medications, insulin - nor supplements.  -Allergies confirmed, per daughter.  -Home pharmacy updated, per daughter.      Prior to Admission medications    Medication Sig Last Dose Taking? Auth Provider   Metoprolol Succinate (TOPROL XL PO) Take 50mg by mouth every " morning. Take 25mg by mouth every evening. 8/27/2017 at Unknown time Yes Unknown, Entered By History   losartan (COZAAR) 25 MG tablet Take 0.5 tablets (12.5 mg) by mouth daily 8/26/2017 at Unknown time Yes Evon Lemons MD   levETIRAcetam (KEPPRA) 750 MG tablet TAKE 1 TABLET (750 MG) BY MOUTH 2 TIMES DAILY 8/27/2017 at AM Yes Thomas Dill MD   tamsulosin (FLOMAX) 0.4 MG capsule TAKE 1 CAPSULE EVERY DAY 8/27/2017 at Unknown time Yes Thomas Dill MD   furosemide (LASIX) 20 MG tablet Take 1 tablet (20 mg) by mouth as needed Past Week at Unknown time Yes Vicky Ziegler, NP   warfarin (COUMADIN) 3 MG tablet Take 3mgtonight and tomorrow night. Follow up with coumadin clinic on Tuesday for further dosing  Patient taking differently: Take 3 mg by mouth daily  8/26/2017 at PM Yes Avtar Alvarez MD   ketotifen (ZADITOR/REFRESH ANTI-ITCH) 0.025 % SOLN ophthalmic solution Place 1 drop into both eyes 2 times daily 8/26/2017 at Unknown time Yes Delgado Agosto MD   SENEXON-S 8.6-50 MG per tablet TAKE 1-2 TABLETS BY MOUTH 2 TIMES DAILY AS NEEDED FOR CONSTIPATION 8/26/2017 at Unknown time Yes Thomas Dill MD   magnesium oxide (MAG-OX) 400 MG tablet Take 1 tablet (400 mg) by mouth 2 times daily 8/26/2017 at Unknown time Yes Gabe Peters MD   finasteride (PROSCAR) 5 MG tablet Take 1 tablet (5 mg) by mouth daily 8/27/2017 at Unknown time Yes Lenore Gardner PA   atorvastatin (LIPITOR) 10 MG tablet TAKE 1 TABLET (10 MG) BY MOUTH DAILY 8/26/2017 at PM Yes Thomas Dill MD   calcium carbonate-vitamin D 500-400 MG-UNIT TABS per tablet Take 1 tablet by mouth daily Past Week at Unknown time Yes Thomas Dill MD   simethicone (MYLICON) 80 MG chewable tablet Take 1 tablet (80 mg) by mouth 4 times daily  Patient taking differently: Take 80 mg by mouth as needed  8/27/2017 at AM Yes Jess Carter APRN CNP   oxyCODONE (ROXICODONE) 5 MG IR tablet Take 1  tablet (5 mg) by mouth every 4 hours as needed for moderate to severe pain Past Month at Unknown time Yes Jess Carter APRN CNP   melatonin 3 MG tablet Take 1 tablet (3 mg) by mouth At Bedtime Past Week at Unknown time Yes Thomas Dill MD   Thiamine HCl (VITAMIN B-1) 100 MG TABS TAKE 1 TABLET (100 MG) BY MOUTH DAILY 8/27/2017 at Unknown time Yes Thomas Dill MD   allopurinol (ZYLOPRIM) 100 MG tablet Take 1 tablet (100 mg) by mouth daily 8/27/2017 at Unknown time Yes Thomas Dill MD   pantoprazole (PROTONIX) 40 MG enteric coated tablet Take 1 tablet (40 mg) by mouth daily 8/27/2017 at AM Yes Jess Carter APRN CNP   loratadine (CLARITIN) 10 MG tablet Take 1 tablet (10 mg) by mouth daily  Patient taking differently: Take 10 mg by mouth as needed  8/26/2017 at Unknown time Yes Thomas Dill MD   nystatin (MYCOSTATIN) 617090 UNIT/GM POWD Apply to affected areas of skin in the groin and on the scrotum three times a day as needed. Past Week at Unknown time Yes Thomas Dill MD   acetaminophen (TYLENOL) 325 MG tablet Take 2 tablets (650 mg) by mouth every 6 hours  Patient taking differently: Take 650 mg by mouth as needed  8/27/2017 at Unknown time Yes Odilon Uriostegui MD   ALEK CONTOUR test strip USE TO TEST BLOOD SUGAR 2 TIMES DAILY OR AS DIRECTED.   Thomas Dill MD   ASPIRIN NOT PRESCRIBED (INTENTIONAL) Please choose reason not prescribed, below   Thomas Dill MD   bisacodyl (DULCOLAX) 10 MG Suppository Place 1 suppository (10 mg) rectally daily as needed for constipation 8/25/2017  Gabe Peters MD   order for DME Equipment being ordered: Cushioned heel boots.   Thomas Dill MD   order for DME Equipment being ordered: Wheelchair, manual, with elevated leg rests and tilt in space back.  Please fax to [facility]; I called them 11/26/16 and they requested the new order.  Face to face is in my 10/26/16 note.   Thomas Dill  MD   order for DME Equipment being ordered: Wheelchair, manual.   Thomas Dill MD   order for DME Equipment being ordered: Hospital Bed, fully electric.   Thomas Dill MD   order for DME Equipment being ordered: Reclining manual w/c with bilateral elevating leg rests.   Thomas Dill MD   order for DME Equipment being ordered: Bilateral leg supports for manual wheelchair.   Thomas Dill MD   nitroglycerin (NITROSTAT) 0.4 MG SL tablet Place 1 tablet (0.4 mg) under the tongue every 5 minutes as needed for chest pain Unknown at Unknown time  Jess Carter, CLAIRE CNP   blood glucose monitoring (NO BRAND SPECIFIED) lancets Use to test blood sugars 2 times daily or as directed.   Thomas Dill MD   ORDER FOR DME Equipment being ordered: Lift chair.    Please see indications and face-to-face encounter details in 2/3/15 Office Visit note.   Thomas Dill MD         Medication history completed by: Minerva Quinn

## 2017-08-27 NOTE — PROGRESS NOTES
Indication of Interrogation:  Hemolysis, eval power and sx of pump thrombus    Type of VAD:  Heartmate 2    Current Parameters:  Flow= 4.1 lpm, Speed= 8800 rpm, Power= 5.4 capps, PI (if applicable)= 4.4    Abnormal Alarm on History:  No    Abnormal Events/Parameters Notes:  Yes, explain: Pt had episode on 8/26 around 2200 lasting about 20mins of flows as high as 12, pwr 10.6, and PI 1.8.  One isolated speed drop to 8490 at 2201.    Changes Made during Interrogation:  No  .I personally provided care for this patient, reviewed chart, discussed course with patient, housestaff and consulting physicians.  I answered all questions.    Spencer Phipps M.D.  Division of Cardiology  Department of Medicine

## 2017-08-27 NOTE — PROGRESS NOTES
Vitals stable throughout day, no VAD alarms. Pt remains A&O x4, daughter at bedside and very attentive to pt needs. D5 NS infusing @ 75ml/hr. Remains on RA. Voiding without difficulty via urinal. Will monitor

## 2017-08-27 NOTE — PHARMACY-ANTICOAGULATION SERVICE
Clinical Pharmacy - Warfarin Dosing Consult     Pharmacy has been consulted to manage this patient s warfarin therapy.  Indication: LVAD/RVAD  Warfarin Prior to Admission: Yes  Warfarin PTA Regimen: 3 mg daily  Drug interactions: heparin (increased risk of bleeding)    INR   Date Value Ref Range Status   08/27/2017 2.09 (H) 0.86 - 1.14 Final   08/21/2017 1.90  Final     Comment:     Home Care      Chromogenic Factor 10   Date Value Ref Range Status   08/10/2014 24 (L) 70 - 130 % Final     Comment:     Therapeutic Range:  A Chromogenic Factor 10 level of approximately 20-40%   inversely correlates with an INR of 2-3 for patients receiving Warfarin.   Chromogenic Factor 10 levels below 20% indicate an INR greater than 3 and   levels above 40% indicate an INR less than 2.       Factor 2 Assay   Date Value Ref Range Status   07/21/2014 25 (L) 60 - 140 % Final     Comment:     Analyte Specific Reagents (ASRs) are used in many laboratory tests necessary   for   standard medical care and generally do not require FDA approval.  This test   was   developed and its performance characteristics determined by [facility].  It has not been cleared or approved by   the [place] Food and Drug Administration.         Recommend warfarin 3 mg today.  Pharmacy will monitor Sohan Rendon daily and order warfarin doses to achieve specified goal.      Please contact pharmacy as soon as possible if the warfarin needs to be held for a procedure or if the warfarin goals change.      Mariam Haley, PharmDANIEL, BCPS  Pager [phone]

## 2017-08-27 NOTE — ED PROVIDER NOTES
History     Chief Complaint   Patient presents with     Irregular Heart Beat     HPI  Sohan Rendon is a 67 year old male with a history of CHF and severe ischemic cardiomyopathy s/p LVAD complicated by recurrent embolic strokes who presents to the ER for evaluation. The patient presents with his daughter who provides collateral history and acts as . Daughter reports that she and family members noticed tonight that the patient's LVAD monitor showed low pump flow to around 1.0, where it is typically 5.0-7.0. Subsequently, the power monitor went up to 10. Family contacted the patient's care coordinator, reported these findings, and they were recommended to come in to the ER for evaluation. The patient here states that he feels okay and he denies any pain or trouble breathings. He reports some global weakness that is chronic. He otherwise denies any other symptoms at this time.    No current facility-administered medications for this encounter.      Current Outpatient Prescriptions   Medication     losartan (COZAAR) 25 MG tablet     levETIRAcetam (KEPPRA) 750 MG tablet     tamsulosin (FLOMAX) 0.4 MG capsule     metoprolol (TOPROL-XL) 50 MG 24 hr tablet     furosemide (LASIX) 20 MG tablet     ALEK CONTOUR test strip     warfarin (COUMADIN) 3 MG tablet     ketotifen (ZADITOR/REFRESH ANTI-ITCH) 0.025 % SOLN ophthalmic solution     ASPIRIN NOT PRESCRIBED (INTENTIONAL)     SENEXON-S 8.6-50 MG per tablet     metoprolol (TOPROL-XL) 25 MG 24 hr tablet     bisacodyl (DULCOLAX) 10 MG Suppository     magnesium oxide (MAG-OX) 400 MG tablet     finasteride (PROSCAR) 5 MG tablet     atorvastatin (LIPITOR) 10 MG tablet     calcium carbonate-vitamin D 500-400 MG-UNIT TABS per tablet     simethicone (MYLICON) 80 MG chewable tablet     oxyCODONE (ROXICODONE) 5 MG IR tablet     melatonin 3 MG tablet     Thiamine HCl (VITAMIN B-1) 100 MG TABS     order for DME     allopurinol (ZYLOPRIM) 100 MG tablet     order for DME      pantoprazole (PROTONIX) 40 MG enteric coated tablet     order for DME     order for DME     loratadine (CLARITIN) 10 MG tablet     order for DME     nystatin (MYCOSTATIN) 121060 UNIT/GM POWD     order for DME     nitroglycerin (NITROSTAT) 0.4 MG SL tablet     blood glucose monitoring (NO BRAND SPECIFIED) lancets     ORDER FOR DME     acetaminophen (TYLENOL) 325 MG tablet     bisacodyl (DULCOLAX) 5 MG EC tablet     Past Medical History:   Diagnosis Date     Acute right MCA stroke (H) 6/2013    With L sided hemiparesis      Anemia of chronic disease     Baseline Hb 8-9     BPH (benign prostatic hyperplasia)      CHF (congestive heart failure), NYHA class IV (H) 10/9/2012    s/p HeartMate II.  Was on milrinone and dobutamine prior to LVAD      CKD (chronic kidney disease)     Baseline Cr=     Clostridium difficile colitis 12/2012      Dysphagia     PEG tube placed in 2012.  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  Normal carotid duplex 3/2014.       Esophagitis 12/2012    EGD with esophagitis and multiple douenal ulcers     Fracture of femoral neck, right, closed (H) 2/3/2015    Presumed pathologic as patient is non-weight-bearing and suffered no trauma.  Family declined operative repair during hospital stay 1/23 - 1/30/15.     Gastric and duodenal angiodysplasia with hemorrhage 6/18/2015     GERD (gastroesophageal reflux disease)      Gout      HTN (hypertension)     LDL=59 (3/29/2014)     Hyperlipaemia      Ischemic cardiomyopathy      Mitral regurgitation     s/p MVR with Carbomedics ring      Myocardial infarction (H) 1998    In [facility] without intervention      Nonsenile cataract     BE     PFO (patent foramen ovale)     s/p closure (10/9/2012)     TB lung, latent     s/p 9 months INH in 2012        Past Surgical History:   Procedure Laterality Date     C CABG, VEIN, FIVE  2001     CATARACT IOL, RT/LT Right 11/19/2015      ENDOSCOPIC RETROGRADE CHOLANGIOPANCREATOGRAM N/A 5/9/2017    Procedure: COMBINED ENDOSCOPIC RETROGRADE CHOLANGIOPANCREATOGRAPHY, PLACE TUBE/STENT;  Endoscopic Retrograde Cholangiopancreatogram, Stent (Zimmon Biliary 7fr 12cm) Placement;  Surgeon: Cristo Grove MD;  Location: [facility] OR     ESOPHAGOSCOPY, GASTROSCOPY, DUODENOSCOPY (EGD), COMBINED N/A 6/10/2015    Procedure: COMBINED ESOPHAGOSCOPY, GASTROSCOPY, DUODENOSCOPY (EGD);  Surgeon: Edu Jenkins MD;  Location: [facility] GI     ESOPHAGOSCOPY, GASTROSCOPY, DUODENOSCOPY (EGD), COMBINED N/A 6/15/2015    Procedure: COMBINED ESOPHAGOSCOPY, GASTROSCOPY, DUODENOSCOPY (EGD);  Surgeon: Yury Bonner MD;  Location: [facility] OR     H INSERT ICD  2/10/2011    And pacemaker.  Not BiV     INSERT VENTRICULAR ASSIST DEVICE LEFT (HEARTMATE II)  10/9/2012     IR GASTRO JEJUNOSTOMY TUBE PLACEMENT       PHACOEMULSIFICATION CLEAR CORNEA WITH STANDARD INTRAOCULAR LENS IMPLANT Right 11/19/2015    Procedure: PHACOEMULSIFICATION CLEAR CORNEA WITH STANDARD INTRAOCULAR LENS IMPLANT;  Surgeon: Violeta Cosme MD;  Location: [facility] OR     REPAIR PATENT FORAMEN OVALE  10/9/2012     REPAIR VALVE MITRAL  2/9/2012    28 mm Carbomedics 2/3 ring        Family History   Problem Relation Age of Onset     Family History Negative No family hx of      Glaucoma No family hx of      Macular Degeneration No family hx of      CANCER No family hx of      no skin cancer       Social History   Substance Use Topics     Smoking status: Former Smoker     Smokeless tobacco: Former User     Alcohol use No        Allergies   Allergen Reactions     Seasonal Allergies        I have reviewed the Medications, Allergies, Past Medical and Surgical History, and Social History in the Epic system.    Review of Systems   Constitutional: Negative for fever.   Respiratory: Negative for shortness of breath.    Cardiovascular: Negative for chest pain.   Gastrointestinal: Negative for abdominal pain.   All other systems  reviewed and are negative.      Physical Exam   BP: (!) 83/69  Pulse: 60  Temp: 97.6  F (36.4  C)  Weight: 81.6 kg (179 lb 14.3 oz)  SpO2: 100 %  Physical Exam   Constitutional: He is oriented to person, place, and time. He appears well-developed and well-nourished.   HENT:   Head: Normocephalic and atraumatic.   Neck: Normal range of motion. Neck supple.   Cardiovascular:   Mechanical sounds   Pulmonary/Chest: Effort normal. No respiratory distress. He has no wheezes.   Abdominal: Soft. He exhibits no distension. There is no tenderness. There is no rebound.   Neurological: He is alert and oriented to person, place, and time.   Left sided hemiparesis   Skin: Skin is warm.   Psychiatric: He has a normal mood and affect. His behavior is normal. Thought content normal.       ED Course     ED Course     Procedures             Critical Care time:  none         Results for orders placed or performed during the hospital encounter of 08/26/17   CBC with platelets differential   Result Value Ref Range    WBC 7.1 4.0 - 11.0 10e9/L    RBC Count 3.29 (L) 4.4 - 5.9 10e12/L    Hemoglobin 9.5 (L) 13.3 - 17.7 g/dL    Hematocrit 30.5 (L) 40.0 - 53.0 %    MCV 93 78 - 100 fl    MCH 28.9 26.5 - 33.0 pg    MCHC 31.1 (L) 31.5 - 36.5 g/dL    RDW 18.5 (H) 10.0 - 15.0 %    Platelet Count 200 150 - 450 10e9/L    Diff Method Automated Method     % Neutrophils 61.0 %    % Lymphocytes 19.1 %    % Monocytes 8.7 %    % Eosinophils 9.8 %    % Basophils 0.8 %    % Immature Granulocytes 0.6 %    Nucleated RBCs 0 0 /100    Absolute Neutrophil 4.3 1.6 - 8.3 10e9/L    Absolute Lymphocytes 1.4 0.8 - 5.3 10e9/L    Absolute Monocytes 0.6 0.0 - 1.3 10e9/L    Absolute Eosinophils 0.7 0.0 - 0.7 10e9/L    Absolute Basophils 0.1 0.0 - 0.2 10e9/L    Abs Immature Granulocytes 0.0 0 - 0.4 10e9/L    Absolute Nucleated RBC 0.0    INR   Result Value Ref Range    INR 2.09 (H) 0.86 - 1.14   Comprehensive metabolic panel   Result Value Ref Range    Sodium 139 133 - 144  mmol/L    Potassium 3.6 3.4 - 5.3 mmol/L    Chloride 109 94 - 109 mmol/L    Carbon Dioxide 24 20 - 32 mmol/L    Anion Gap 7 3 - 14 mmol/L    Glucose 143 (H) 70 - 99 mg/dL    Urea Nitrogen 18 7 - 30 mg/dL    Creatinine 1.87 (H) 0.66 - 1.25 mg/dL    GFR Estimate 36 (L) >60 mL/min/1.7m2    GFR Estimate If Black 44 (L) >60 mL/min/1.7m2    Calcium 8.1 (L) 8.5 - 10.1 mg/dL    Bilirubin Total 0.6 0.2 - 1.3 mg/dL    Albumin 3.0 (L) 3.4 - 5.0 g/dL    Protein Total 7.2 6.8 - 8.8 g/dL    Alkaline Phosphatase 126 40 - 150 U/L    ALT 16 0 - 70 U/L    AST 36 0 - 45 U/L   Nt probnp inpatient (BNP)   Result Value Ref Range    N-Terminal Pro BNP Inpatient 811 0 - 900 pg/mL   Lactate Dehydrogenase   Result Value Ref Range    Lactate Dehydrogenase 854 (H) 85 - 227 U/L     *Note: Due to a large number of results and/or encounters for the requested time period, some results have not been displayed. A complete set of results can be found in Results Review.     Medications   heparin  drip 25,000 units in 0.45% NaCl 250 mL (see additional administration details for dose) (1,000 Units/hr Intravenous New Bag 8/27/17 0115)   heparin bolus from infusion pump (not administered)            Labs Ordered and Resulted from Time of ED Arrival Up to the Time of Departure from the ED - No data to display         Assessments & Plan (with Medical Decision Making)   The patient is a 67-year-old male well-appearing history of an LVAD who presented to the ER due to his LVAD parameters being abnormal. The patient's flow was elevated to 11 and his power was increased to 9-10 from a baseline of known normal of 4. The patient's PI was also decreased to 2.6-2.8. The patient's numbers were reviewed by his LVAD coordinator prior to arrival to the ER. This case was discussed with Dr. Phipps, who recommends admission to the hospital. The patient will be started on a heparin drip. The patient agrees with the plan of care.    This part of the document was  transcribed by Raul Mcwilliams for Damaris Perez MD.    I have reviewed the nursing notes.    I have reviewed the findings, diagnosis, plan and need for follow up with the patient.    New Prescriptions    No medications on file       Final diagnoses:   LVAD (left ventricular assist device) present (H)   Left ventricular assist device (LVAD) complication, initial encounter     I, Raul Mcwilliams, am serving as a trained medical scribe to document services personally performed by Damaris Perez MD, based on the provider's statements to me.      I, Damaris Perez MD, was physically present and have reviewed and verified the accuracy of this note documented by Raul Mcwilliams.    8/26/2017   [facility] [place], EMERGENCY DEPARTMENT     Damaris Perez MD  08/27/17 0126

## 2017-08-27 NOTE — PROGRESS NOTES
D:  Received call from pt's daughter regarding flows of +++ and 7, naranjo up to 10 and PIs decreased to 1.7.  She says pt denies complaints.  No change in urine, breathing, swelling.  I:  Encouraged pt to go to ED.  Notified ED and On call MD of pt's impending arrival.  A:  Daughter verbalized understanding.  P:   VAD coordinator on call will be available for questions or VAD related issues.

## 2017-08-28 NOTE — PROGRESS NOTES
CM notified client was admitted to the hospital 8/26 with problems with his LVAD. CM spoke to the nurse caring for client today. She states client is improving, they feel he may have been dehydrated which caused the problems. If he continues to do ok the plan is to discharge later today or tomorrow. AMANDA reviewed the services he receives in the home. She states client's family has been at his bedside assisting with his cares. CM also informed her client has skilled nurse visits through [facility]. Will continue to follow.  Nena Salazar RN  [facility]   [phone]

## 2017-08-28 NOTE — PROGRESS NOTES
D: Admit 4E (as 6C overflow) s/t LVAD parameters suspicious for possible recurrent LVAD pump thrombosis (low PIs, high flows, power spikes).  I/A: HM2 without alarms and numbers range from=Flow: 3.6-4.1/PI: 4.6-5.6/Speed: 3659-9865/Power: 4.9-5.2. HR 60s, BP WNL. Respiratory status stable on RA. Neuro AAO x4. Multiple family present requesting to personally provide cares/ADLs (including when/how to proceed with LVAD driveline dressing change). D5NS continues @ 75/hr. Voiding adequate amounts without difficulty.   P: Will continue close monitor of LVAD and Pt status.     Nora Simmons RN BSN CCRN

## 2017-08-28 NOTE — PROGRESS NOTES
CARDIOLOGY PROGRESS NOTE    SUBJECTIVE:  Mr. Rendon's TTE revealed that his ventricle was being sucked down by his LV cannula and, thus, hypovolemic. Heparin stopped and then D5/saline infusion was started. Unremarkable interval course otherwise.     ROS otherwise negative.    Hx via daughter, who translated from Guamanian for us    OBJECTIVE:  Vital signs:  106/89 (Range  /72-89)  58 (HR 59-70)  11 (RR 9-18)  97.5 (Tm 98)  194 lbs .08 oz (Weight 179 lbs on 08/26)     I/O: +797   Intake: 1922 (825 IV)   Output: 1125     PHYSICAL EXAM:  Gen: Looks well  HEENT: More moist mucous membranes today; no scleral icterus  Resp: No signs of resp distress, CTAB  CVS: JVP visible at the base of the neck today; S1+S2 with LVAD hum   Abdo: ND, S, NT, +BS  Extremities: Warm, well-perfused, no edema   Neuro: GCS 15/15     Recent Labs   Lab Test  08/28/17   0620  08/28/17   0425   08/27/17   0021   HGB  9.4*  7.7*   < >  9.5*   HCT  30.2*  25.0*   < >  30.5*   WBC  4.8  4.0   < >  7.1   MCV  92  92   < >  93   MCH  28.6  28.3   < >  28.9   MCHC  31.1*  30.8*   < >  31.1*   RDW  18.5*  18.5*   < >  18.5*   PLT  183  165   < >  200   NA   --   Unsatisfactory specimen - possible contamination   < >  139   POTASSIUM   --   Unsatisfactory specimen - possible contamination   < >  3.6   CHLORIDE   --   Unsatisfactory specimen - possible contamination   < >  109   CO2   --   Unsatisfactory specimen - possible contamination   < >  24   BUN   --   Unsatisfactory specimen - possible contamination   < >  18   CR   --   Unsatisfactory specimen - possible contamination   < >  1.87*   GLC   --   Unsatisfactory specimen - possible contamination   < >  143*   JOSÉ   --   Unsatisfactory specimen - possible contamination   < >  8.1*   ALBUMIN   --    --    --   3.0*   BILITOTAL   --    --    --   0.6   ALKPHOS   --    --    --   126   AST   --    --    --   36   ALT   --    --    --   16    < > = values in this interval not displayed.      LDH  INR    Micro:  Nil new    Imagin/27 TTE:  Limited LVAD study.  The LV appears aggressively decompressed. The inflow cannula is in close proximity with the septum, more prominently than on study 2017.   The RV is normal in size or only marginally dilated. RV function is low normal.  The IVC is small.    Cardiac meds: Atorvastatin 10, losartan 12.5 mg q24h, Toprol XL 50/25, warfarin   Non-cardiac meds: Allopurinol, calcium carbonate, finasteride, levetiracetam, loratadine, magnesium oxide, melatonin, pantoprazole, simethicone, tamsulosin, thiamine   Drips: Dextrose 5%/0.9% NaCl infusion    ASSESSMENT/PLAN:  Mr. Sohan Rendon is a 67-year old male with a PMHx of ICM s/p HM2 LVAD placement (c/b multiple embolic strokes and GI bleeding episodes), CKD 3 and HTN who presented for evaluation of LVAD parameters suspicious for repeat LVAD pump thrombosis (low PIs, high flows, power spikes).     - ICM s/p LVAD (c/b prior GI/ bleeding requiring transfusion, strokes)   - Continue rehydration PO but will d/c infusion later today    - Transfer to floor   - If mobilizing/transferring without symptoms, will consider discharge    - Discontinue daily LDH   - Continue warfarin with INR goal 1.8-2.5    - Continue metoprolol succinate 50/25 regimen, losartan 12.5 mg q24h     Chronic/inactive issues:  - CKD 3: Baseline Cr 1.7-2.1   - HTN: Med regimen as above  - Chronic normocytic anemia: monitor   - Dyslipidemia: atorvastatin  - Gout, insomnia, chronic pain syndrome, allergic rhinitis: continue home loratadine, melatonin, allopurinol    Staffed with Dr. Phipps.    Ruben Sevilla  Cardiology Fellow  Pager [phone]            The patient's intravascular volume has been restored.  He is improved and has had no further events to suggest recurrent suction.  I would like to mobilize him and arrange for early discharge.  Spencer Phipps M.D.,

## 2017-08-28 NOTE — PROGRESS NOTES
[facility]  Patient is currently open to home care services with [place].  The patient is currently receiving RN palliative services.  [facility]  and team have been notified of patient admission.  [facility] liaison will continue to follow patient during stay.  If appropriate provide orders to resume home care at time of discharge.    Melissa Sifuentes RN, BSN  [facility] Liaison  [phone]

## 2017-08-29 NOTE — PROGRESS NOTES
CARDIOLOGY PROGRESS NOTE    SUBJECTIVE:  Had brief period with decreased PI and power spikes this morning. Resolved spontaneously. No speed drops.  Reviewed with VAD Coordinator -- consistent with previous picture from thrombus.      Had some right neck pain and fasciculation when turning head yesterday. Chronic.     ROS otherwise negative.    OBJECTIVE:  Vital signs:  114/84 (Range  /80-90) with LVAD settings flows 3.7-4.1, PI 5.4, 8800 power 4.5-5.2  58 (HR 58-74)  15 (RR 8-21)  98.1 (Tm 98.1)   Weight 08/27 194 lbs     I/O: -5 mL  Intake: 1120 in (intake)  Output: 1125 out (2x unmeasured urine output)    PHYSICAL EXAM:  Gen: Looks well  HEENT: MMM; no skin changes, neck masses or TTP  Resp: No signs of resp distress, CTAB anteriorly  CVS: JVP at the base of the neck, S1+S2 with LVAD hum   Abdo: ND, S, NT, +BS  Extremities:  Warm, well-perfused  Neuro: GCS 15/15     Recent Labs   Lab Test  08/28/17   0620   08/27/17   0021   HGB  9.4*   < >  9.5*   HCT  30.2*   < >  30.5*   WBC  4.8   < >  7.1   MCV  92   < >  93   MCH  28.6   < >  28.9   MCHC  31.1*   < >  31.1*   RDW  18.5*   < >  18.5*   PLT  183   < >  200   NA  142   < >  139   POTASSIUM  4.1   < >  3.6   CHLORIDE  114*   < >  109   CO2  22   < >  24   BUN  17   < >  18   CR  1.78*   < >  1.87*   GLC  144*   < >  143*   JOSÉ  7.9*   < >  8.1*   ALBUMIN   --    --   3.0*   BILITOTAL   --    --   0.6   ALKPHOS   --    --   126   AST   --    --   36   ALT   --    --   16    < > = values in this interval not displayed.     INR 2.32  Glc 143-149    Micro:  UA SG 1.002, negative glc/bilirubin/ketones/protein/LE    Imaging:  --    Cardiac meds: Atorvastatin 10 mg q24h, losartan 12.5 mg q24h, magnesium oxide, Toprol 50/20, warfarin   Non-cardiac meds: Allopurinol 100 mg q24h, calcium caronate, finasteride, ketotifen eye drops, keppra 750 mg BID, loratadine, melatonin, pantoprazole, simethicone, tamsulosin, thiamine  Drips: none    ASSESSMENT/PLAN:  Mr. Parry  Justice is a 67-year old male with a PMHx of ICM s/p HM2 LVAD placement (c/b multiple embolic strokes and GI bleeding episodes), CKD 3 and HTN who presented for evaluation of LVAD parameters suspicious for repeat LVAD pump thrombosis (low PIs, high flows, power spikes). He was found to have ventricular suckdown on TTE.    Improved with rehydration       - ICM s/p LVAD (c/b prior GI/ bleeding requiring transfusion, strokes)   - Discharge today    - Continue metoprolol succinate 50/25 regimen, losartan 12.5 mg q24h   - PRN Lasix regimen at home; advised to maintain adequate oral intake   - VAD clinic follow-up in one week       Chronic/inactive issues:  - CKD 3: Baseline Cr 1.7-2.1   - HTN: Med regimen as above  - Chronic normocytic anemia: monitor   - Dyslipidemia: atorvastatin  - Gout, insomnia, chronic pain syndrome, allergic rhinitis: continue home loratadine, melatonin, allopurinol    Seen and staffed with Dr. Phipps.    Ruben Sevilla  Cardiology Fellow  Pager [phone]    I personally provided care for this patient, reviewed chart, discussed course with patient, housestaff and consulting physicians.  I answered all questions.    Spencer Phipps M.D.  Division of Cardiology  Department of Medicine

## 2017-08-29 NOTE — PLAN OF CARE
Problem: Goal Outcome Summary  Goal: Goal Outcome Summary  PT 6C: Per chart review, discussion with RN, SW and family; pt is at baseline with 24 hr care from family at home.  Bed bound at home with lift used to transfer from chair to bed. PT recommends d/c home at [facility].

## 2017-08-29 NOTE — PROGRESS NOTES
Care Coordinator- Discharge Planning     Admission Date/Time:  8/26/2017  Attending MD:  Spencer Phipps   Data  Chart reviewed, discussed with interdisciplinary team.   Patient was admitted for:   1. LVAD (left ventricular assist device) present (H)    2. Left ventricular assist device (LVAD) complication, initial encounter    3. Pacemaker twiddler's syndrome, initial encounter    Assessment  Concerns with insurance coverage for discharge needs: None.  Current Living Situation: Patient lives with family.  Support System: Supportive and Involved adult children.   Services Involved:PCA, home care, [facility].   Transportation: [employer] [facility]    Coordination of Care and Referrals: Provided patient/family with options for resumption of home care and transportation.   Pt's daughter, Antionette Ruffin said that she and her sisters are pt's PCA and they provide his care 24/7. Pt wants his home care resumed with [facility]. Pt's daughter said that pt will need a stretcher ride home upon discharge.   Intervention:      Arrangements made with [facility] (Ph: [phone] Fax: [phone]) for RN eval post hospitalization, resumption of previous home care orders, assess vital signs, respiratory and cardiac status, activity tolerance, hydration, nutritional status, med set up and management. INR lab draws; with results to the [facility].     Plan  Anticipated Discharge Date:  8/29/17  Stretcher ride home arranged with [employer] (Ph: [phone]) today at 3:30 PM.   Anticipated Discharge Plan:  Discharge to home with home care.     CTS Handoff completed:  YES  JAYLEN KLEIN RN BSN  Care Coordinator  [phone]

## 2017-08-29 NOTE — PLAN OF CARE
Problem: Goal Outcome Summary  Goal: Goal Outcome Summary  Outcome: No Change  S: Pt LVAD numbers are stable with no alarms. Pt c/o mild pain in head, acetaminophen give with relief, pt cough with minimal secretions, Afebrile, tolerated diet, family helps set up food tray, family assists patient in hygiene and cares. Pt answered a few questions, minimal communication. Family would communicate for patient. Orientation unknown besides family indicating he is oriented.  P: Continue to monitor and possible d/c today.

## 2017-08-29 NOTE — PLAN OF CARE
Problem: Goal Outcome Summary  Goal: Goal Outcome Summary  Pt A&Ox4, mostly Belgian speaking, but can communicate through some English.  Pain in the neck was controlled with tylenol.  VSS, LVAD flow +++, PI's dipping to 2.3, power 7.4.  No BM, but suppository given.  LVAD dsg chg done. Daughter in room was helpful with communication and cares.  Pt D/C'd around 1530.

## 2017-08-29 NOTE — PROGRESS NOTES
Pt and Pt's daughter did not have any VAD related questions or concerns. Emotional support offered.

## 2017-08-29 NOTE — PLAN OF CARE
Problem: Goal Outcome Summary  Goal: Goal Outcome Summary  DISCHARGE                         8/29/2017  3:50 PM  ----------------------------------------------------------------------------  Discharged to: Home  Via: [facility]  Accompanied by: Family  Discharge Instructions: diet, activity, medications, follow up appointments, when to call the MD, aftercare instructions, and what to watchout for (i.e. s/s of infection, increasing SOB, palpitations, chest pain.)  Prescriptions: To be filled by D/C pharmacy per pt's request; medication list reviewed & sent with pt  Follow Up Appointments: arranged; information given  Belongings: All sent with pt  IV: out  Telemetry: off  Pt exhibits understanding of above discharge instructions; all questions answered.     Discharge Paperwork: Signed, copied, and sent home with patient.

## 2017-08-29 NOTE — PLAN OF CARE
Problem: Goal Outcome Summary  Goal: Goal Outcome Summary  Outcome: No Change  S:  Pt's LVAD numbers stable.  Tolerated getting oob to chair with lift without change in v/s or LVAD parameters.  Afebrile.  Tolerating ordered diet.  BM x1.   Voiding spontaneously using urinal with assist x1.  Denies pain.  Pt's family assists with hygiene and cares.  According to MD pt appropriate, answering questions-family states that pt is at baseline.  Pt did not however speak to me or acknowledge me when I attempted to communicate with him this shift.    P:  Pt will possibly d/c to home tomorrow.

## 2017-08-30 NOTE — PROGRESS NOTES
CM notified of client's discharge from the hospital 08/29 and spoke with client's daughter Lisa osorio ([phone]). She states client is doing well. She has no questions or concerns. Client is followed by  Home Care. He has appointment scheduled with PCP.   Nena Salazar RN  [facility]   [phone]

## 2017-08-30 NOTE — MR AVS SNAPSHOT
S[facility]   8/30/2017   Anticoagulation Therapy Visit    Description:  67 year old male   Provider:  Estuardo Zepeda, [facility]   Department:  [facility]           INR as of 8/30/2017     Today's INR 1.91 (8/29/2017)      Anticoagulation Summary as of 8/30/2017     INR goal    Prior goal 1.8-2.5   Today's INR 1.91 (8/29/2017)   Full instructions 3 mg every day   Next INR check 8/31/2017    Indications   LVAD (left ventricular assist device) present [Z95.811]  Long-term (current) use of anticoagulants [Z79.01] [Z79.01]         August 2017 Details    Sun Mon Tue Wed Thu Fri Sat       1               2               3               4               5                 6               7               8               9               10               11               12                 13               14               15               16               17               18               19                 20               21               22               23               24               25               26                 27               28               29               30      3 mg   See details      31               Date Details   08/30 This INR check       Date of next INR:  8/31/2017         How to take your warfarin dose     To take:  3 mg Take 1 of the 3 mg tablets.

## 2017-08-30 NOTE — MR AVS SNAPSHOT
Sohan [facility]   8/30/2017   Anticoagulation Therapy Visit    Description:  67 year old male   Provider:  Blanca Bah RN   Department:  [facility]           INR as of 8/30/2017     Today's INR 1.8      Anticoagulation Summary as of 8/30/2017     INR goal    Prior goal 1.8-2.5   Today's INR 1.8   Full instructions 3 mg every day   Next INR check 9/6/2017    Indications   LVAD (left ventricular assist device) present [Z95.811]  Long-term (current) use of anticoagulants [Z79.01] [Z79.01]         August 2017 Details    Sun Mon Tue Wed Thu Fri Sat       1               2               3               4               5                 6               7               8               9               10               11               12                 13               14               15               16               17               18               19                 20               21               22               23               24               25               26                 27               28               29               30      3 mg   See details      31      3 mg            Date Details   08/30 This INR check               How to take your warfarin dose     To take:  3 mg Take 1 of the 3 mg tablets.           September 2017 Details    Sun Mon Tue Wed Thu Fri Sat          1      3 mg         2      3 mg           3      3 mg         4      3 mg         5      3 mg         6            7               8               9                 10               11               12               13               14               15               16                 17               18               19               20               21               22               23                 24               25               26               27               28               29               30                Date Details   No additional details    Date of next INR:  9/6/2017         How to take your  warfarin dose     To take:  3 mg Take 1 of the 3 mg tablets.

## 2017-08-30 NOTE — DISCHARGE SUMMARY
[facility]  Discharge Summary     Sohan Rendon MRN# [id number]   YOB: 1950 Age: 67 year old       Admission Date: 8/26/2017  Discharge Date: 8/29/2017    Discharge Diagnoses:  1. Ventricular suckdown due to hypovolemia     Imaging:  Echocardiogram 08/27/2017:  Limited LVAD study.  The LV appears aggressively decompressed. The inflow cannula is in close proximity with the septum, more prominently than on study 8/9/2017.  The RV is normal in size or only marginally dilated. RV function is low normal.  The IVC is small.    Procedures:  None    Consults:  None     HPI (adapted from admission H&P)   Sohan Rendon is a 67 year old male with severe ICM s/p HM II LVAD as DT c/b recurrent device thrombi, recurrent hemolysis and multiple embolic CVAs, recurrent GI/urogenital bleeding (goal INR 1.8 - 2.5), CKD Stage III, HTN, latent TB, and recurrent UTIs who was brought in to the ED by his daughter after it was noticed, Pt's PIs on his LVAD monitor were <2, when they are usually 3-5. Pt's daughter reports Pt is bedbound and she is his primary caregiver. There have been no changes recently in Pt's condition. She does not weigh him because he his bedbound, but has had his normal urinary amounts with no signs of edema. Pt denies any fevers, chills, chest pain, palpitations, cough, shortness of breath, abdominal pain, n/v/d/c, or new weakness. Pt is chronically weak all over 2/2 multiple CVAs, but no new symptoms recently. Pt's daughter changes his driveline site, and denies any new drainage or erythema. Pt has been eating and drinking his usual amount, but this is always a low amount.      On device interrogation in the ED, Pt's flow was 4.1, Speed 8800, PI 3.3, and Power 5.4 of his HM II. Pt was hemodynamically stable on arrival. After review of the LVAD recent hx on the monitor. Pt started having brief episodes of speeds dropping to ~8500 around 7PM on 8/26. Then from  around 8PM to 10PM, Pt's LVAD speeds were between 3268-0528, flows between 10.3-11.7, naranjo between 9-10, and PIs between 1.9-3.4.    Brief Hospital Course  Mr. Rendon was initially admitted with concerns for pump thrombosis, in view of power spikes with concomitant drops in flow and speed. He was initially started on low-intensity heparin for the first 12 hours after which TTE revealed that there was probably suckdown of the septum into the inflow cannula.  The last such episode was prior to admission. This prompted the treating team to discontinue heparinization and then seek rehydration with D5/NS for approximately 12 hours.  This was supported by his daughter describing excessive urine output in response to a dose of furosemide with low PO intake.    After rehydration, Mr. Rendon did not have any further speed drops.  Mr. Rendon was witnessed to transfer from bed to chair multiple times in the 24 hours of discharge (baseline level of activity, per daughter) without any speed drops.  He did, however, exhibit repetitive power spikes on the day of discharge without speed drops.  This was noted to be consistent with his day-to-day VAD picture in view of his chronic thrombi. This situation was also reviewed with the VAD Coordinator team in person.  Of note, admission LDH was 854 and INR was noted to be 2.09.  On the day of discharge, his INR was 1.91.  His LDH was last checked on 08/28 and was 807. His INR goal is 1.8-2.3, per his last clinic note.    Discharge Information  Discharge diet:  Cardiac  Discharge activity:  Activity as tolerated  Discharge instructions:   Follow-up as beloe  Disposition:  Discharged to home      Medication Changes  No med changes.     Discharge Medications  Discharge Medication List as of 8/29/2017  2:35 PM      CONTINUE these medications which have NOT CHANGED    Details   Metoprolol Succinate (TOPROL XL PO) Take 50mg by mouth every morning. Take 25mg by mouth every evening., Historical       losartan (COZAAR) 25 MG tablet Take 0.5 tablets (12.5 mg) by mouth daily, Disp-45 tablet, R-3, E-Prescribe      levETIRAcetam (KEPPRA) 750 MG tablet TAKE 1 TABLET (750 MG) BY MOUTH 2 TIMES DAILY, Disp-180 tablet, R-3, E-Prescribe      tamsulosin (FLOMAX) 0.4 MG capsule TAKE 1 CAPSULE EVERY DAY, Disp-90 capsule, R-3, E-Prescribe      furosemide (LASIX) 20 MG tablet Take 1 tablet (20 mg) by mouth as needed, Disp-20 tablet, R-11, E-Prescribe      warfarin (COUMADIN) 3 MG tablet Take 3mgtonight and tomorrow night. Follow up with coumadin clinic on Tuesday for further dosing, Disp-180 tablet, R-3, Local Print      ketotifen (ZADITOR/REFRESH ANTI-ITCH) 0.025 % SOLN ophthalmic solution Place 1 drop into both eyes 2 times daily, Disp-1 Bottle, R-11, E-PrescribeInstructions in [facility] if possible      SENEXON-S 8.6-50 MG per tablet TAKE 1-2 TABLETS BY MOUTH 2 TIMES DAILY AS NEEDED FOR CONSTIPATION, Disp-60 tablet, R-3, E-PrescribeDX Code Needed  .      magnesium oxide (MAG-OX) 400 MG tablet Take 1 tablet (400 mg) by mouth 2 times daily, Disp-14 tablet, R-0, E-Prescribe      finasteride (PROSCAR) 5 MG tablet Take 1 tablet (5 mg) by mouth daily, Disp-90 tablet, R-3, E-Prescribe      atorvastatin (LIPITOR) 10 MG tablet TAKE 1 TABLET (10 MG) BY MOUTH DAILY, Disp-90 tablet, R-3, E-Prescribe      calcium carbonate-vitamin D 500-400 MG-UNIT TABS per tablet Take 1 tablet by mouth daily, Disp-90 tablet, R-3, E-Prescribe      simethicone (MYLICON) 80 MG chewable tablet Take 1 tablet (80 mg) by mouth 4 times daily, Disp-180 tablet, R-5, E-Prescribe      oxyCODONE (ROXICODONE) 5 MG IR tablet Take 1 tablet (5 mg) by mouth every 4 hours as needed for moderate to severe pain, Disp-30 tablet, R-0, Local Print      melatonin 3 MG tablet Take 1 tablet (3 mg) by mouth At Bedtime, Historical      Thiamine HCl (VITAMIN B-1) 100 MG TABS TAKE 1 TABLET (100 MG) BY MOUTH DAILY, Disp-90 tablet, R-3, E-Prescribe      allopurinol (ZYLOPRIM) 100 MG  tablet Take 1 tablet (100 mg) by mouth daily, Disp-90 tablet, R-3, E-Prescribe      pantoprazole (PROTONIX) 40 MG enteric coated tablet Take 1 tablet (40 mg) by mouth daily, Disp-180 tablet, R-3, E-Prescribe      loratadine (CLARITIN) 10 MG tablet Take 1 tablet (10 mg) by mouth daily, Disp-90 tablet, R-3, E-Prescribe      nystatin (MYCOSTATIN) 723543 UNIT/GM POWD Apply to affected areas of skin in the groin and on the scrotum three times a day as needed.Disp-60 g, [id number]      acetaminophen (TYLENOL) 325 MG tablet Take 2 tablets (650 mg) by mouth every 6 hours, Disp-100 tablet, R-0, Fax      ALEK CONTOUR test strip USE TO TEST BLOOD SUGAR 2 TIMES DAILY OR AS DIRECTED., Disp-100 strip, R-1, E-Prescribe      ASPIRIN NOT PRESCRIBED (INTENTIONAL) Reason ASA was Not Prescribed: Current anticoagulant therapy (warfarin/enoxaparin)      bisacodyl (DULCOLAX) 10 MG Suppository Place 1 suppository (10 mg) rectally daily as needed for constipation, Disp-5 suppository, R-1, E-Prescribe      !! order for DME Equipment being ordered: Cushioned heel boots.Disp-2 each, R-0, Local Print      !! order for DME Equipment being ordered: Wheelchair, manual, with elevated leg rests and tilt in space back.  Please fax to [facility]; I called them 11/26/16 and they requested the new order.  Face to face is in my 10/26/16 note.Disp-1 each, R-0, Local Print      !! order for DME Equipment being ordered: Wheelchair, manual.Disp-1 each, R-0, Local Print      !! order for DME Equipment being ordered: Hospital Bed, fully electric.Disp-1 each, R-0, Local Print      !! order for DME Equipment being ordered: Reclining manual w/c with bilateral elevating leg rests.Disp-1 each, R-0, Local Print      !! order for DME Equipment being ordered: Bilateral leg supports for manual wheelchair.Disp-2 each, R-0, Local Print      nitroglycerin (NITROSTAT) 0.4 MG SL tablet Place 1 tablet (0.4 mg) under the tongue every 5 minutes as needed for chest pain,  Disp-30 tablet, R-1, E-Prescribe      blood glucose monitoring (NO BRAND SPECIFIED) lancets Use to test blood sugars 2 times daily or as directed.Disp-1 Box, [id number]      !! ORDER FOR DME Equipment being ordered: Lift chair.    Please see indications and face-to-face encounter details in 2/3/15 Office Visit note.Disp-1 Device, R-0, Local Print       !! - Potential duplicate medications found. Please discuss with provider.      STOP taking these medications       bisacodyl (DULCOLAX) 5 MG EC tablet Comments:   Reason for Stopping:               Discharge Follow-up  Follow up with VAD clinic in 1-2 weeks for check of fluid status and diuretics     Code Status  FULL     CC  Patient Care Team:  Thomas Dill MD as PCP - General (Pulmonary)  Vivi Toledo, RN as VAD Coordinator (Nurse)  Edu Calabrese MD as MD (Internal Medicine)  Henry Taylor MD as MD (Family Medicine - Sports Medicine)  Henry Martinez MD as MD (Ophthalmology)  Evon Lemons MD as Cardiologist (Cardiology)  Kerri Deutsch LICSW as  ( - Clinical)  Connor Mari MD as Resident (Ophthalmology)  Julien Rodriguez MD as Resident (Student in organized health care education/training program)  Maryam Lr MD as MD (Ophthalmology)  Mariam Cat as Clinic Care Coordinator  Nena Salazar, SHAWN as   Nathalie Haas as Other (see comments)  Liz Sampson, Jaleel Packer MD as Resident (Student in organized health care education/training program)        I personally provided care for this patient, reviewed chart, discussed course with patient, housestaff and consulting physicians.  I answered all questions.  Coordination of discharge care 65 minutes.      Spencer Phipps M.D.  Division of Cardiology  Department of Medicine

## 2017-08-30 NOTE — PROGRESS NOTES
ANTICOAGULATION FOLLOW-UP CLINIC VISIT    Patient Name:  Sohan Rendon  Date:  8/30/2017  Contact Type:  Telephone    SUBJECTIVE:     Patient Findings     Comments Patient was recently discharged from the hospital.  Admitting diagnosis was dehydration.           OBJECTIVE    INR   Date Value Ref Range Status   08/30/2017 1.8  Final     Chromogenic Factor 10   Date Value Ref Range Status   08/10/2014 24 (L) 70 - 130 % Final     Comment:     Therapeutic Range:  A Chromogenic Factor 10 level of approximately 20-40%   inversely correlates with an INR of 2-3 for patients receiving Warfarin.   Chromogenic Factor 10 levels below 20% indicate an INR greater than 3 and   levels above 40% indicate an INR less than 2.       Factor 2 Assay   Date Value Ref Range Status   07/21/2014 25 (L) 60 - 140 % Final     Comment:     Analyte Specific Reagents (ASRs) are used in many laboratory tests necessary   for   standard medical care and generally do not require FDA approval.  This test   was   developed and its performance characteristics determined by [facility].  It has not been cleared or approved by   the [place] Food and Drug Administration.         ASSESSMENT / PLAN  INR assessment THER    Recheck INR In: 1 WEEK    INR Location [facility]R      Anticoagulation Summary as of 8/30/2017     INR goal    Prior goal 1.8-2.5   Today's INR 1.8   Maintenance plan 3 mg (3 mg x 1) every day   Full instructions 3 mg every day   Weekly total 21 mg   No change documented Blanca Bah RN   Plan last modified Sarah Martinez, RN (8/21/2017)   Next INR check 9/6/2017   Priority INR   Target end date Indefinite    Indications   LVAD (left ventricular assist device) present [Z95.811]  Long-term (current) use of anticoagulants [Z79.01] [Z79.01]         Anticoagulation Episode Summary     INR check location     Preferred lab     Send INR reminders to [facility]    Comments Goal Range is  1.8-2.3  [facility] comes out to draw INR  Sofya Ph [phone]  Daughter Almaz Ph [phone]      Anticoagulation Care Providers     Provider Role Specialty Phone number    Evon Lemons MD Responsible Cardiology [phone]            See the Encounter Report to view Anticoagulation Flowsheet and Dosing Calendar (Go to Encounters tab in chart review, and find the Anticoagulation Therapy Visit)    Spoke with Sadia MCKINNON.    Blanca Bah RN

## 2017-08-30 NOTE — PROGRESS NOTES
D:  Called pt's daughter to check in 24hrs post dc from hospital.  No answer.  I:  Left message to call VAD coordinator on call if any issues or concerns.  P:  VAD Coordinator is available for any questions or VAD related issues.

## 2017-08-30 NOTE — PROGRESS NOTES
Dates of hospitalization: 8/27/17 to 8/29/17  Reason for hospitalization: Dehydration  Vitamin K or FFP administered? No  Inpatient warfarin doses added to calendar?  Yes  Warfarin dosing after DC: 3mgs daily  Patient discharged on Lovenox? No  Next INR date: 8/31/17  Where is the patient discharging to? (home, TCU, staying locally, etc.): Home  Will patient have home care?  Yes JONNIE MCKINNON

## 2017-09-01 NOTE — PROGRESS NOTES
Sent message to SHAWN Mccullough [facility] giving verbal ok on orders requested per RN protocol.    Concetta Burr RN

## 2017-09-01 NOTE — PROGRESS NOTES
[facility] now requests orders and shares plan of care/discharge summaries for some patients through [employer].  Please REPLY TO THIS MESSAGE in order to give authorization for orders when needed.  This is considered a verbal order, you will still receive a faxed copy of orders for signature.  Thank you for your assistance in improving collaboration for our patients.    ORDER    SN visits 1 week 7   3 as needed

## 2017-09-06 NOTE — NURSING NOTE
1). PUMP DATA  Primary controller serial number: [id number]     II:   Flow: --- L/min,    Speed: 8800 RPMs,     PI: 4.5 ,  Power: 5 Slaughter,      Primary controller   Back up battery: Patient use: n/a, Replace in: n/a  Months     Data downloaded: No   Equipment and driveline assessed for damage: Yes     Back up : Serial number: [id number]  Back up battery: Patient use: n/a Replace in: n/a  Months  Programmed settings identical to the settings on the primary controller :Yes      Education complete: Yes   Charge the BACKUP controller s backup battery every 6 months  Perform a self test on BACKUP every 6 months  Change the MPU s batteries every 6 months: n/a  Have equipment serviced yearly (if applicable):Yes    2). ALARMS  Alarms reported by patient since last pump evaluation: No  Alarms or other finding noted during pump data history and alarm download: Yes - several PI events, history only going back 24hr. BP stable, appears euvolemic, no speed drops/changes.     Action Taken:  Reviewed data with patient: Yes      3). DRESSING CHANGE / DRIVELINE ASSESSMENT  Dressing change completed today: No  Appearance of Driveline site: c/d/i per pt report    Driveline stabilization: Method: [employer]  [ Teaching reinforced on need for stabilization of Driveline. ]

## 2017-09-06 NOTE — MR AVS SNAPSHOT
After Visit Summary   9/6/2017    Sohan Rendon    MRN: [id number]           Patient Information     Date Of Birth          1950        Visit Information        Provider Department      9/6/2017 9:45 AM Vicky Ziegler, NP; [employer] [facility]        Today's Diagnoses     LVAD (left ventricular assist device) present (H)    -  1      Care Instructions    Medications:  1. No changes   Follow-up:  1. See Dr. Lemons in 3 months, and a Nurse Practitioner in 6 months  Instructions:  1. No changes - keep up the good work!    Great to see you in clinic today. Let us know if you need anything anytime via our pager at [phone] option #4 and ask to speak to the VAD coordinator on call.               Follow-ups after your visit        Follow-up notes from your care team     Return in about 3 months (around 12/6/2017) for Routine Visit.      Your next 10 appointments already scheduled     Sep 12, 2017  2:00 PM CDT   [facility] with [facility] Dill MD   [facility]    [address]  [address]  [facility] [address]   [phone]            Oct 10, 2017  2:00 PM CDT   Return Visit with Thomas Dill MD   [facility] ([facility]    [address]  [address]  [facility] [address]   [phone]            Nov 14, 2017  2:00 PM CST   Return Visit with Thomas Dill MD   [facility] ([facility]    [address]  [address]  [facility] [address]   [phone]            Dec 12, 2017  2:00 PM CST   Return Visit with Thomas Dill MD   [facility] ([facility])    [address]  [address]  [facility] [address]   [phone]            Dec 13, 2017 10:40 AM CST   (Arrive by 10:25 AM)   Ventricular Assist Device with Evon Lemons MD   [facility] ([facility]    [address] [address] [phone]            Mar 09, 2018 11:00 AM CST   (Arrive by 10:45 AM)   Ventricular Assist Device with CLAIRE Escobar CNP   [facility] ([facility])    [address] [place] [id number] [phone]              Who to contact     If you have questions or need follow up information about today's clinic visit or your schedule please contact [facility] directly at [phone].  Normal or non-critical lab and imaging results will be communicated to you by [employer], letter or phone within 4 business days after the clinic has received the results. If you do not hear from us within 7 days, please contact the clinic through [employer] or phone. If you have a critical or abnormal lab result, we will notify you by phone as soon as possible.  Submit refill requests through [employer] or call your pharmacy and they will forward the refill request to us. Please allow 3 business days for your refill to be completed.          Additional Information About Your Visit        MyChart Information     [employer] lets you send messages to your doctor, view your test results, renew your prescriptions, schedule appointments and more. To sign up, go to [email or web address] . Click on \"Log in\" on the left side of the screen, which will take you to the Welcome page. Then click on \"Sign up Now\" on the right side of the page.     You will be asked to enter the access code listed below, as well as some personal information. Please follow the directions to create your username and password.     Your access code is: [email or web address]  Expires: 2017  8:55 AM     Your access code will  in 90 days. If you need help or a new code, please call your [place] clinic or [phone].        Care EveryWhere ID     This is your Care EveryWhere ID. This could be used by other organizations to access " "your [place] medical records  [id number]        Your Vitals Were     Pulse Temperature Height Pulse Oximetry BMI (Body Mass Index)       64 98.1  F (36.7  C) 1.727 m (5' 8\") 98% 25.85 kg/m2        Blood Pressure from Last 3 Encounters:   09/06/17 (!) 72/0   08/29/17 106/82   08/16/17 92/60    Weight from Last 3 Encounters:   09/06/17 77.1 kg (170 lb)   08/27/17 88 kg (194 lb 0.1 oz)   08/09/17 82.6 kg (182 lb)              We Performed the Following     ([id number]) INTERROGATE VENT ASSIST DEVICE, IN PERSON, ARAMIS SAWYER ANALYSIS PARAMETERS          Today's Medication Changes          These changes are accurate as of: 9/6/17 10:37 AM.  If you have any questions, ask your nurse or doctor.               These medicines have changed or have updated prescriptions.        Dose/Directions    acetaminophen 325 MG tablet   Commonly known as:  TYLENOL   This may have changed:    - when to take this  - reasons to take this   Used for:  Femoral neck fracture, right, closed, initial encounter        Dose:  650 mg   Take 2 tablets (650 mg) by mouth every 6 hours   Quantity:  100 tablet   Refills:  0       loratadine 10 MG tablet   Commonly known as:  CLARITIN   This may have changed:    - when to take this  - reasons to take this   Used for:  Allergic rhinitis, unspecified allergic rhinitis type        Dose:  10 mg   Take 1 tablet (10 mg) by mouth daily   Quantity:  90 tablet   Refills:  3       simethicone 80 MG chewable tablet   Commonly known as:  MYLICON   This may have changed:    - when to take this  - reasons to take this   Used for:  Slow transit constipation        Dose:  80 mg   Take 1 tablet (80 mg) by mouth 4 times daily   Quantity:  180 tablet   Refills:  5       warfarin 3 MG tablet   Commonly known as:  COUMADIN   This may have changed:    - how much to take  - how to take this  - when to take this  - additional instructions   Used for:  Cerebrovascular accident (CVA) due to embolism of right middle cerebral artery (H)     "    Take 3mgtonight and tomorrow night. Follow up with coumadin clinic on Tuesday for further dosing   Quantity:  180 tablet   Refills:  3                Primary Care Provider Office Phone # Fax #    Thomas Dill [phone]       [address] [facility] [address]  [facility] [address]        Equal Access to Services     [facility]NA [facility] Hospital[facility]ENDA : Hadii aad ku hadasho Soomaali, waaxda luqadaha, qaybta kaalmada adeegyada, waxay idiin hayaan adeeg kharash la'aan . So [facility] [phone].    ATENCIÓN: Si habla español, tiene a smith disposición servicios gratuitos de asistencia lingüística. Juliame al [phone].    We comply with applicable federal civil rights laws and [place] laws. We do not discriminate on the basis of race, color, national origin, age, disability sex, sexual orientation or gender identity.            Thank you!     Thank you for choosing [facility]  for your care. Our goal is always to provide you with excellent care. Hearing back from our patients is one way we can continue to improve our services. Please take a few minutes to complete the written survey that you may receive in the mail after your visit with us. Thank you!             Your Updated Medication List - Protect others around you: Learn how to safely use, store and throw away your medicines at www.disposemymeds.org.          This list is accurate as of: 9/6/17 10:37 AM.  Always use your most recent med list.                   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    Hypoglycemia       bisacodyl 10 MG Suppository    DULCOLAX    5 suppository    Place 1 suppository (10 mg) rectally daily as needed for constipation    Drug-induced constipation       blood glucose monitoring lancets     1 Box    Use to test blood sugars 2 times daily or as directed.    Diabetes mellitus, type 2 (H)       calcium carbonate-vitamin D 500-400 MG-UNIT Tabs per tablet     90 tablet    Take 1 tablet by mouth daily    Cardiac arrhythmia, unspecified cardiac arrhythmia type       finasteride 5 MG tablet    PROSCAR    90 tablet    Take 1 tablet (5 mg) by mouth daily    Incomplete bladder emptying       furosemide 20 MG tablet    LASIX    20 tablet    Take 1 tablet (20 mg) by mouth as needed    Chronic systolic congestive heart failure (H)       ketotifen 0.025 % Soln ophthalmic solution    ZADITOR/REFRESH ANTI-ITCH    1 Bottle    Place 1 drop into both eyes 2 times daily    Allergic conjunctivitis, bilateral       levETIRAcetam 750 MG tablet    KEPPRA    180 tablet    TAKE 1 TABLET (750 MG) BY MOUTH 2 TIMES DAILY    Convulsions, unspecified convulsion type (H)       loratadine 10 MG tablet    CLARITIN    90 tablet    Take 1 tablet (10 mg) by mouth daily    Allergic rhinitis, unspecified allergic rhinitis type       losartan 25 MG tablet    COZAAR    45 tablet    Take 0.5 tablets (12.5 mg) by mouth daily    CHF (congestive heart failure), NYHA class IV, chronic, systolic (H)       magnesium oxide 400 MG tablet    MAG-OX    14 tablet    Take 1 tablet (400 mg) by mouth 2 times daily    Abdominal distention, Drug-induced constipation       melatonin 3 MG tablet      Take 1 tablet (3 mg) by mouth At Bedtime     Insomnia, unspecified type       nitroGLYcerin 0.4 MG sublingual tablet    NITROSTAT    30 tablet    Place 1 tablet (0.4 mg) under the tongue every 5 minutes as needed for chest pain    Coronary artery disease involving native coronary artery with unstable angina pectoris (H)       nystatin 996709 UNIT/GM Powd    MYCOSTATIN    60 g    Apply to affected areas of skin in the groin and on the scrotum three times a day as needed.        * order for DME     1 Device    Equipment being ordered: Lift chair.  Please see indications and face-to-face encounter details in 2/3/15 Office Visit note.    Fracture of femoral neck, right, closed, initial encounter, Stroke (H), CHF (congestive heart failure), NYHA class IV (H)       * order for DME     2 each    Equipment being ordered: Bilateral leg supports for manual wheelchair.    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  Please fax to [employer]; I called them 11/26/16 and  they requested the new order.  Face to face is in my 10/26/16 note.    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, subsequent encounter       pantoprazole 40 MG EC tablet    PROTONIX    180 tablet    Take 1 tablet (40 mg) by mouth daily    Gastrointestinal hemorrhage associated with chronic superficial gastritis       SENEXON-S 8.6-50 MG per tablet   Generic drug:  senna-docusate     60 tablet    TAKE 1-2 TABLETS BY MOUTH 2 TIMES DAILY AS NEEDED FOR CONSTIPATION    Slow transit constipation       simethicone 80 MG chewable tablet    MYLICON    180 tablet    Take 1 tablet (80 mg) by mouth 4 times daily    Slow transit constipation       tamsulosin 0.4 MG capsule    FLOMAX    90 capsule    TAKE 1 CAPSULE EVERY DAY    Incomplete bladder emptying       thiamine 100 MG tablet     90 tablet    TAKE 1 TABLET (100 MG) BY MOUTH DAILY    Cerebrovascular accident (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 Take 25mg by mouth every evening.        warfarin 3 MG tablet    COUMADIN    180 tablet    Take 3mgtonight and tomorrow night. Follow up with coumadin clinic on Tuesday for further dosing    Cerebrovascular accident (CVA) due to embolism of right middle cerebral artery (H)       * Notice:  This list has 7 medication(s) that are the same as other medications prescribed for you. Read the directions carefully, and ask your doctor or other care provider to review them with you.

## 2017-09-06 NOTE — MR AVS SNAPSHOT
[facility]   9/6/2017   Anticoagulation Therapy Visit    Description:  67 year old male   Provider:  Sarah Martinez, RN   Department:  [facility]           INR as of 9/6/2017     Today's INR 1.74!      Anticoagulation Summary as of 9/6/2017     INR goal    Prior goal 1.8-2.5   Today's INR 1.74!   Full instructions 9/6: 6 mg; Otherwise 3 mg every day   Next INR check 9/8/2017    Indications   LVAD (left ventricular assist device) present [Z95.811]  Long-term (current) use of anticoagulants [Z79.01] [Z79.01]         September 2017 Details    Sun Mon Tue Wed Thu Fri Sat          1               2                 3               4               5               6      6 mg   See details      7      3 mg         8            9                 10               11               12               13               14               15               16                 17               18               19               20               21               22               23                 24               25               26               27               28               29               30                Date Details   09/06 This INR check       Date of next INR:  9/8/2017         How to take your warfarin dose     To take:  3 mg Take 1 of the 3 mg tablets.    To take:  6 mg Take 2 of the 3 mg tablets.

## 2017-09-06 NOTE — PROGRESS NOTES
ANTICOAGULATION FOLLOW-UP CLINIC VISIT    Patient Name:  Sohan Rendon  Date:  9/6/2017  Contact Type:  Telephone    SUBJECTIVE:     Patient Findings     Positives Unexplained INR or factor level change    Comments Pt is not on ASA due to hx of GI Bleed. Daughter Almaz reports the only change is pt is drinking more Chamomile tea.            OBJECTIVE    INR   Date Value Ref Range Status   09/06/2017 1.74 (H) 0.86 - 1.14 Final     Chromogenic Factor 10   Date Value Ref Range Status   08/10/2014 24 (L) 70 - 130 % Final     Comment:     Therapeutic Range:  A Chromogenic Factor 10 level of approximately 20-40%   inversely correlates with an INR of 2-3 for patients receiving Warfarin.   Chromogenic Factor 10 levels below 20% indicate an INR greater than 3 and   levels above 40% indicate an INR less than 2.       Factor 2 Assay   Date Value Ref Range Status   07/21/2014 25 (L) 60 - 140 % Final     Comment:     Analyte Specific Reagents (ASRs) are used in many laboratory tests necessary   for   standard medical care and generally do not require FDA approval.  This test   was   developed and its performance characteristics determined by [facility].  It has not been cleared or approved by   the US Food and Drug Administration.         ASSESSMENT / PLAN  INR assessment SUB    Recheck INR In: 2 DAYS    INR Location Clinic      Anticoagulation Summary as of 9/6/2017     INR goal    Prior goal 1.8-2.5   Today's INR 1.74!   Maintenance plan 3 mg (3 mg x 1) every day   Full instructions 9/6: 6 mg; Otherwise 3 mg every day   Weekly total 21 mg   Plan last modified Sarah Martinez RN (8/21/2017)   Next INR check 9/8/2017   Priority INR   Target end date Indefinite    Indications   LVAD (left ventricular assist device) present [Z95.811]  Long-term (current) use of anticoagulants [Z79.01] [Z79.01]         Anticoagulation Episode Summary     INR check location     Preferred lab     Send  INR reminders to [facility]    Comments Goal Range is 1.8-2.3  [facility] comes out to draw INR  [facility] Ph [phone]  Daughter Almaz Ph [phone]      Anticoagulation Care Providers     Provider Role Specialty Phone number    Evon Lemons MD Responsible Cardiology [phone]            See the Encounter Report to view Anticoagulation Flowsheet and Dosing Calendar (Go to Encounters tab in chart review, and find the Anticoagulation Therapy Visit)    Spoke with alex Muse. Gave them lab results and new warfarin recommendation.  No changes in health, medication, or diet. No missed doses, no falls. No signs or symptoms of bleed or clotting. Also left vm with [facility] about having a nurse go out on Friday 9/8/17 to recheck an INR.     Sarah Martinez RN

## 2017-09-06 NOTE — PATIENT INSTRUCTIONS
Medications:  1. No changes   Follow-up:  1. See Dr. Lemons in 3 months, and a Nurse Practitioner in 6 months  Instructions:  1. No changes - keep up the good work!    Great to see you in clinic today. Let us know if you need anything anytime via our pager at [phone] option #4 and ask to speak to the VAD coordinator on call.

## 2017-09-06 NOTE — PROGRESS NOTES
HPI: Mr. Rendon is a 67 year old male with a past medical history including ICM s/p HM II LVAD as DT complicated by pump thrombosis, recurrent hemolysis, and multiple CVA's, CKD, CDiff, HTN, hyperlipidemia, hematuria, and latent TB.     He was recently hospitalized at [facility] 8/26-8/29 for concerns for pump thrombosis with speed drops from 8800 to 8500, low PI's (less than 2, normal 3-5 for him) and higher flows.  He was started on low intensity heparin for approximately 12 hours.  JUAN DANIEL obtained revealed a suckdown of the septum into the inflow cannula. Heparin was discontinued and he instead received rehydration with 12 hours of D5/NS and responded nicely with stable LVAD parameters.      Since discharge Sohan states through a professional  that he has been feeling well.  He has been urinating well.  Hasn't taken any lasix.  Drinks  approximately 2 L of fluid a day.  Denies SOB, HOOK, PND, orthopnea, edema, weight gain, chest pain, palpitations, lightheadedness, dizziness, near syncopal/syncopal episodes    Denies HA, neurological changes, hematuria, hematochezia, melena, epistaxis, fever, chills or any concerns for driveline infection.      PAST MEDICAL HISTORY:  Past Medical History:   Diagnosis Date     Acute right MCA stroke (H) 6/2013    With L sided hemiparesis      Anemia of chronic disease     Baseline Hb 8-9     BPH (benign prostatic hyperplasia)      CHF (congestive heart failure), NYHA class IV (H) 10/9/2012    s/p HeartMate II.  Was on milrinone and dobutamine prior to LVAD      CKD (chronic kidney disease)     Baseline Cr=     Clostridium difficile colitis 12/2012      Dysphagia     PEG tube placed in 2012.  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  Normal carotid duplex 3/2014.       Esophagitis 12/2012    EGD with esophagitis and multiple douenal ulcers     Fracture of femoral neck, right, closed  (H) 2/3/2015    Presumed pathologic as patient is non-weight-bearing and suffered no trauma.  Family declined operative repair during hospital stay 1/23 - 1/30/15.     Gastric and duodenal angiodysplasia with hemorrhage 6/18/2015     GERD (gastroesophageal reflux disease)      Gout      HTN (hypertension)     LDL=59 (3/29/2014)     Hyperlipaemia      Ischemic cardiomyopathy      Mitral regurgitation     s/p MVR with Carbomedics ring      Myocardial infarction (H) 1998    In [facility] without intervention      Nonsenile cataract     BE     PFO (patent foramen ovale)     s/p closure (10/9/2012)     TB lung, latent     s/p 9 months INH in 2012        FAMILY HISTORY:  Family History   Problem Relation Age of Onset     Family History Negative No family hx of      Glaucoma No family hx of      Macular Degeneration No family hx of      CANCER No family hx of      no skin cancer       SOCIAL HISTORY:  Social History     Social History     Marital status:      Spouse name: N/A     Number of children: N/A     Years of education: N/A     Social History Main Topics     Smoking status: Former Smoker     Smokeless tobacco: Former User     Alcohol use No     Drug use: No     Sexual activity: Not Asked     Other Topics Concern     None     Social History Narrative       CURRENT MEDICATIONS:  Current Outpatient Prescriptions   Medication Sig Dispense Refill     Metoprolol Succinate (TOPROL XL PO) Take 50mg by mouth every morning. Take 25mg by mouth every evening.       losartan (COZAAR) 25 MG tablet Take 0.5 tablets (12.5 mg) by mouth daily 45 tablet 3     levETIRAcetam (KEPPRA) 750 MG tablet TAKE 1 TABLET (750 MG) BY MOUTH 2 TIMES DAILY 180 tablet 3     tamsulosin (FLOMAX) 0.4 MG capsule TAKE 1 CAPSULE EVERY DAY 90 capsule 3     furosemide (LASIX) 20 MG tablet Take 1 tablet (20 mg) by mouth as needed 20 tablet 11     ALEK CONTOUR test strip USE TO TEST BLOOD SUGAR 2 TIMES DAILY OR AS DIRECTED. 100 strip 1     warfarin  (COUMADIN) 3 MG tablet Take 3mgtonight and tomorrow night. Follow up with coumadin clinic on Tuesday for further dosing (Patient taking differently: Take 3 mg by mouth daily ) 180 tablet 3     ketotifen (ZADITOR/REFRESH ANTI-ITCH) 0.025 % SOLN ophthalmic solution Place 1 drop into both eyes 2 times daily 1 Bottle 11     ASPIRIN NOT PRESCRIBED (INTENTIONAL) Please choose reason not prescribed, below 0 each 0     SENEXON-S 8.6-50 MG per tablet TAKE 1-2 TABLETS BY MOUTH 2 TIMES DAILY AS NEEDED FOR CONSTIPATION 60 tablet 3     bisacodyl (DULCOLAX) 10 MG Suppository Place 1 suppository (10 mg) rectally daily as needed for constipation 5 suppository 1     magnesium oxide (MAG-OX) 400 MG tablet Take 1 tablet (400 mg) by mouth 2 times daily 14 tablet 0     finasteride (PROSCAR) 5 MG tablet Take 1 tablet (5 mg) by mouth daily 90 tablet 3     atorvastatin (LIPITOR) 10 MG tablet TAKE 1 TABLET (10 MG) BY MOUTH DAILY 90 tablet 3     calcium carbonate-vitamin D 500-400 MG-UNIT TABS per tablet Take 1 tablet by mouth daily 90 tablet 3     simethicone (MYLICON) 80 MG chewable tablet Take 1 tablet (80 mg) by mouth 4 times daily (Patient taking differently: Take 80 mg by mouth as needed ) 180 tablet 5     oxyCODONE (ROXICODONE) 5 MG IR tablet Take 1 tablet (5 mg) by mouth every 4 hours as needed for moderate to severe pain 30 tablet 0     melatonin 3 MG tablet Take 1 tablet (3 mg) by mouth At Bedtime       Thiamine HCl (VITAMIN B-1) 100 MG TABS TAKE 1 TABLET (100 MG) BY MOUTH DAILY 90 tablet 3     order for DME Equipment being ordered: Cushioned heel boots. 2 each 0     allopurinol (ZYLOPRIM) 100 MG tablet Take 1 tablet (100 mg) by mouth daily 90 tablet 3     order for DME Equipment being ordered: Wheelchair, manual, with elevated leg rests and tilt in space back.  Please fax to [facility]; I called them 11/26/16 and they requested the new order.  Face to face is in my 10/26/16 note. 1 each 0     pantoprazole (PROTONIX) 40 MG enteric  coated tablet Take 1 tablet (40 mg) by mouth daily 180 tablet 3     order for DME Equipment being ordered: Wheelchair, manual. 1 each 0     order for DME Equipment being ordered: Hospital Bed, fully electric. 1 each 0     loratadine (CLARITIN) 10 MG tablet Take 1 tablet (10 mg) by mouth daily (Patient taking differently: Take 10 mg by mouth as needed ) 90 tablet 3     order for DME Equipment being ordered: Reclining manual w/c with bilateral elevating leg rests. 1 each 0     nystatin (MYCOSTATIN) 499606 UNIT/GM POWD Apply to affected areas of skin in the groin and on the scrotum three times a day as needed. 60 g 3     order for DME Equipment being ordered: Bilateral leg supports for manual wheelchair. 2 each 0     nitroglycerin (NITROSTAT) 0.4 MG SL tablet Place 1 tablet (0.4 mg) under the tongue every 5 minutes as needed for chest pain 30 tablet 1     blood glucose monitoring (NO BRAND SPECIFIED) lancets Use to test blood sugars 2 times daily or as directed. 1 Box 3     ORDER FOR DME Equipment being ordered: Lift chair.    Please see indications and face-to-face encounter details in 2/3/15 Office Visit note. 1 Device 0     acetaminophen (TYLENOL) 325 MG tablet Take 2 tablets (650 mg) by mouth every 6 hours (Patient taking differently: Take 650 mg by mouth as needed ) 100 tablet 0       ROS:   Constitutional: No fever, chills, or sweats. Isn't able to check a weight as he is bedridden.  ENT: Monthly headache unchanged.  No visual disturbance, ear ache, epistaxis, sore throat.   Allergies/Immunologic: Negative.   Respiratory: No cough, hemoptysis.   Cardiovascular: As per HPI.   GI: No nausea, vomiting, hematemesis, melena, or hematochezia.   : Hematuria.  No urinary frequency, dysuria.   Integument: Negative. No driveline concerns   Psychiatric: Negative.   Neuro: Negative.  Denies any neurological changes, weakness, or changes in vision.   Endocrinology: Negative.   Musculoskeletal: Unchanged.  Overall weakness  "and neck pain.    EXAM:  BP (!) 72/0 (BP Location: Right arm, Patient Position: Chair, Cuff Size: Adult Regular)  Pulse 64  Temp 98.1  F (36.7  C)  Ht 1.727 m (5' 8\")  Wt 77.1 kg (170 lb)  SpO2 98%  BMI 25.85 kg/m2  General: appears comfortable, alert and articulate, lying in the stretcher.  Head: normocephalic, atraumatic  Eyes: anicteric sclera, EOMI  Neck: no adenopathy, no carotid bruits, neck supple.  Orophyarynx: moist mucosa, no lesions, dentition intact  Heart: LVAD hum present, regular, S1/S2, no murmur, gallop, rub, estimated JVP approximately 9 cm  Lungs: clear, no rales or wheezing, decreased to bases.   Abdomen: soft, non-tender, bowel sounds present, no hepatomegaly.  Driveline CDI.    Extremities: no clubbing, cyanosis or edema  Neurological: normal speech and affect, no gross motor deficits    Labs:  CBC RESULTS:  Lab Results   Component Value Date    WBC 6.7 09/06/2017    RBC 3.40 (L) 09/06/2017    HGB 9.8 (L) 09/06/2017    HCT 32.2 (L) 09/06/2017    MCV 95 09/06/2017    MCH 28.8 09/06/2017    MCHC 30.4 (L) 09/06/2017    RDW 19.1 (H) 09/06/2017     09/06/2017       CMP RESULTS:  Lab Results   Component Value Date     09/06/2017    POTASSIUM 4.0 09/06/2017    CHLORIDE 110 (H) 09/06/2017    CO2 23 09/06/2017    ANIONGAP 7 09/06/2017     (H) 09/06/2017    BUN 20 09/06/2017    CR 2.02 (H) 09/06/2017    GFRESTIMATED 33 (L) 09/06/2017    GFRESTBLACK 40 (L) 09/06/2017    JOSÉ 8.4 (L) 09/06/2017    BILITOTAL 0.6 09/06/2017    ALBUMIN 3.0 (L) 09/06/2017    ALKPHOS 136 09/06/2017    ALT 20 09/06/2017    AST 52 (H) 09/06/2017        INR RESULTS:  Lab Results   Component Value Date    INR 1.74 (H) 09/06/2017       No components found for: CK  Lab Results   Component Value Date    MAG 1.8 06/29/2017     Lab Results   Component Value Date    NTBNP 1687 (H) 11/11/2015       Most recent echocardiogram 8/26/17:  Limited LVAD study.  The LV appears aggressively decompressed. The inflow " cannula is in close proximity with the septum, more prominently than on study 8/9/2017.  The RV is normal in size or only marginally dilated. RV function is low normal.  The IVC is small    Assessment and Plan:    Sohan is a 67 year old male with ICM s/p LVAD HM II placement who appears to be well compensated from a heart failure perspective. I have made no changes to his medications.  He will follow up as scheduled in three months or sooner if his symptoms change or worsen in the interim.      1.  Chronic systolic heart failure secondary to ICM.  Stage D  NYHA Class II  ACEi/ARB: yes, Continue Losartan 12.5 mg daily.  BB: yes, continue Toprol XL 50 mg in the am, and 25 mg in the pm.   Aldosterone antagonist: contraindicated due to renal dysfunction  SCD prophylaxis: ICD  Fluid status: euvolemic    2.  S/P LVAD implant as DT   Anticoagulation: Warfarin INR goal of 1.8-2.3 secondary to recurrent GIB and gross hematuria.  Antiplatelet:  Deferred secondary to GIB and gross hematuria.  MAP:  Controlled at 72.    LDH:  Not obtained, as his LDH trends are always high secondary to hemolysis. Last  on 8/28.  VAD Interrogation today: VAD interrogation reviewed with VAD coordinator. Agree with findings.  Several  PI events noted, with no power spikes, speed drops, or other findings suspicious of pump malfunction noted.   1). PUMP DATA  Primary controller serial number: [id number]  HM II:   Flow: --- L/min,    Speed: 8800 RPMs,     PI: 4.5 ,  Power: 5 Slaughter,       Primary controller   Back up battery: Patient use: n/a, Replace in: n/a  Months     Data downloaded: No   Equipment and driveline assessed for damage: Yes      Back up : Serial number: [id number]  Back up battery: Patient use: n/a Replace in: n/a  Months  Programmed settings identical to the settings on the primary controller :Yes       Education complete: Yes   Charge the BACKUP controller s backup battery every 6 months  Perform a self test on  BACKUP every 6 months  Change the MPU s batteries every 6 months: n/a  Have equipment serviced yearly (if applicable):Yes     2). ALARMS  Alarms reported by patient since last pump evaluation: No  Alarms or other finding noted during pump data history and alarm download: Yes - several PI events, history only going back 24hr. BP stable, appears euvolemic, no speed drops/changes.      Action Taken:  Reviewed data with patient: Yes        3). DRESSING CHANGE / DRIVELINE ASSESSMENT  Dressing change completed today: No  Appearance of Driveline site: c/d/i per pt report     Driveline stabilization: Method: [employer]  [ Teaching reinforced on need for stabilization of Driveline. ]     3.  CKD:  Trends 1.45 to 2.24.  Creatinine today is 2.02 up from 1.79.  Continue to monitor as he appears euvolemic today.  Avoid nephrotoxins as able.    4.  CAD:  Stable.  Continue statin. ASA deferred secondary to bleeding history.    5.  Gross Hematuria:  Noted on UA today.  Unchanged. His primary MD is following.  Continue to monitor.  Results reviewed with patient and daughter.     6.  Hyperlipidemia:  Taking statin.     7.  Follow up in three months with Dr. Lemons as scheduled.       Vicky RANGEL, CNP  [phone]

## 2017-09-06 NOTE — LETTER
9/6/2017      RE: Sohan Rendon  [address]   [facility] [address]       Dear Colleague,    Thank you for the opportunity to participate in the care of your patient, Sohan Rendon, at the [facility]. Please see a copy of my visit note below.    HPI: Mr. Rendon is a 67 year old male with a past medical history including ICM s/p HM II LVAD as DT complicated by pump thrombosis, recurrent hemolysis, and multiple CVA's, CKD, CDiff, HTN, hyperlipidemia, hematuria, and latent TB.     He was recently hospitalized at [facility] 8/26-8/29 for concerns for pump thrombosis with speed drops from 8800 to 8500, low PI's (less than 2, normal 3-5 for him) and higher flows.  He was started on low intensity heparin for approximately 12 hours.  JUAN DANIEL obtained revealed a suckdown of the septum into the inflow cannula. Heparin was discontinued and he instead received rehydration with 12 hours of D5/NS and responded nicely with stable LVAD parameters.      Since discharge Soahn states through a professional  that he has been feeling well.  He has been urinating well.  Hasn't taken any lasix.  Drinks  approximately 2 L of fluid a day.  Denies SOB, HOOK, PND, orthopnea, edema, weight gain, chest pain, palpitations, lightheadedness, dizziness, near syncopal/syncopal episodes    Denies HA, neurological changes, hematuria, hematochezia, melena, epistaxis, fever, chills or any concerns for driveline infection.      PAST MEDICAL HISTORY:  Past Medical History:   Diagnosis Date     Acute right MCA stroke (H) 6/2013    With L sided hemiparesis      Anemia of chronic disease     Baseline Hb 8-9     BPH (benign prostatic hyperplasia)      CHF (congestive heart failure), NYHA class IV (H) 10/9/2012    s/p HeartMate II.  Was on milrinone and dobutamine prior to LVAD      CKD (chronic kidney disease)     Baseline Cr=     Clostridium difficile colitis 12/2012      Dysphagia      PEG tube placed in 2012.  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  Normal carotid duplex 3/2014.       Esophagitis 12/2012    EGD with esophagitis and multiple douenal ulcers     Fracture of femoral neck, right, closed (H) 2/3/2015    Presumed pathologic as patient is non-weight-bearing and suffered no trauma.  Family declined operative repair during hospital stay 1/23 - 1/30/15.     Gastric and duodenal angiodysplasia with hemorrhage 6/18/2015     GERD (gastroesophageal reflux disease)      Gout      HTN (hypertension)     LDL=59 (3/29/2014)     Hyperlipaemia      Ischemic cardiomyopathy      Mitral regurgitation     s/p MVR with Carbomedics ring      Myocardial infarction (H) 1998    In [facility] without intervention      Nonsenile cataract     BE     PFO (patent foramen ovale)     s/p closure (10/9/2012)     TB lung, latent     s/p 9 months INH in 2012        FAMILY HISTORY:  Family History   Problem Relation Age of Onset     Family History Negative No family hx of      Glaucoma No family hx of      Macular Degeneration No family hx of      CANCER No family hx of      no skin cancer       SOCIAL HISTORY:  Social History     Social History     Marital status:      Spouse name: N/A     Number of children: N/A     Years of education: N/A     Social History Main Topics     Smoking status: Former Smoker     Smokeless tobacco: Former User     Alcohol use No     Drug use: No     Sexual activity: Not Asked     Other Topics Concern     None     Social History Narrative       CURRENT MEDICATIONS:  Current Outpatient Prescriptions   Medication Sig Dispense Refill     Metoprolol Succinate (TOPROL XL PO) Take 50mg by mouth every morning. Take 25mg by mouth every evening.       losartan (COZAAR) 25 MG tablet Take 0.5 tablets (12.5 mg) by mouth daily 45 tablet 3     levETIRAcetam (KEPPRA) 750 MG tablet TAKE 1 TABLET (750 MG)  BY MOUTH 2 TIMES DAILY 180 tablet 3     tamsulosin (FLOMAX) 0.4 MG capsule TAKE 1 CAPSULE EVERY DAY 90 capsule 3     furosemide (LASIX) 20 MG tablet Take 1 tablet (20 mg) by mouth as needed 20 tablet 11     ALEK CONTOUR test strip USE TO TEST BLOOD SUGAR 2 TIMES DAILY OR AS DIRECTED. 100 strip 1     warfarin (COUMADIN) 3 MG tablet Take 3mgtonight and tomorrow night. Follow up with coumadin clinic on Tuesday for further dosing (Patient taking differently: Take 3 mg by mouth daily ) 180 tablet 3     ketotifen (ZADITOR/REFRESH ANTI-ITCH) 0.025 % SOLN ophthalmic solution Place 1 drop into both eyes 2 times daily 1 Bottle 11     ASPIRIN NOT PRESCRIBED (INTENTIONAL) Please choose reason not prescribed, below 0 each 0     SENEXON-S 8.6-50 MG per tablet TAKE 1-2 TABLETS BY MOUTH 2 TIMES DAILY AS NEEDED FOR CONSTIPATION 60 tablet 3     bisacodyl (DULCOLAX) 10 MG Suppository Place 1 suppository (10 mg) rectally daily as needed for constipation 5 suppository 1     magnesium oxide (MAG-OX) 400 MG tablet Take 1 tablet (400 mg) by mouth 2 times daily 14 tablet 0     finasteride (PROSCAR) 5 MG tablet Take 1 tablet (5 mg) by mouth daily 90 tablet 3     atorvastatin (LIPITOR) 10 MG tablet TAKE 1 TABLET (10 MG) BY MOUTH DAILY 90 tablet 3     calcium carbonate-vitamin D 500-400 MG-UNIT TABS per tablet Take 1 tablet by mouth daily 90 tablet 3     simethicone (MYLICON) 80 MG chewable tablet Take 1 tablet (80 mg) by mouth 4 times daily (Patient taking differently: Take 80 mg by mouth as needed ) 180 tablet 5     oxyCODONE (ROXICODONE) 5 MG IR tablet Take 1 tablet (5 mg) by mouth every 4 hours as needed for moderate to severe pain 30 tablet 0     melatonin 3 MG tablet Take 1 tablet (3 mg) by mouth At Bedtime       Thiamine HCl (VITAMIN B-1) 100 MG TABS TAKE 1 TABLET (100 MG) BY MOUTH DAILY 90 tablet 3     order for DME Equipment being ordered: Cushioned heel boots. 2 each 0     allopurinol (ZYLOPRIM) 100 MG tablet Take 1 tablet (100 mg)  by mouth daily 90 tablet 3     order for DME Equipment being ordered: Wheelchair, manual, with elevated leg rests and tilt in space back.  Please fax to [facility]; I called them 11/26/16 and they requested the new order.  Face to face is in my 10/26/16 note. 1 each 0     pantoprazole (PROTONIX) 40 MG enteric coated tablet Take 1 tablet (40 mg) by mouth daily 180 tablet 3     order for DME Equipment being ordered: Wheelchair, manual. 1 each 0     order for DME Equipment being ordered: Hospital Bed, fully electric. 1 each 0     loratadine (CLARITIN) 10 MG tablet Take 1 tablet (10 mg) by mouth daily (Patient taking differently: Take 10 mg by mouth as needed ) 90 tablet 3     order for DME Equipment being ordered: Reclining manual w/c with bilateral elevating leg rests. 1 each 0     nystatin (MYCOSTATIN) 359565 UNIT/GM POWD Apply to affected areas of skin in the groin and on the scrotum three times a day as needed. 60 g 3     order for DME Equipment being ordered: Bilateral leg supports for manual wheelchair. 2 each 0     nitroglycerin (NITROSTAT) 0.4 MG SL tablet Place 1 tablet (0.4 mg) under the tongue every 5 minutes as needed for chest pain 30 tablet 1     blood glucose monitoring (NO BRAND SPECIFIED) lancets Use to test blood sugars 2 times daily or as directed. 1 Box 3     ORDER FOR DME Equipment being ordered: Lift chair.    Please see indications and face-to-face encounter details in 2/3/15 Office Visit note. 1 Device 0     acetaminophen (TYLENOL) 325 MG tablet Take 2 tablets (650 mg) by mouth every 6 hours (Patient taking differently: Take 650 mg by mouth as needed ) 100 tablet 0       ROS:   Constitutional: No fever, chills, or sweats. Isn't able to check a weight as he is bedridden.  ENT: Monthly headache unchanged.  No visual disturbance, ear ache, epistaxis, sore throat.   Allergies/Immunologic: Negative.   Respiratory: No cough, hemoptysis.   Cardiovascular: As per HPI.   GI: No nausea, vomiting,  "hematemesis, melena, or hematochezia.   : Hematuria.  No urinary frequency, dysuria.   Integument: Negative. No driveline concerns   Psychiatric: Negative.   Neuro: Negative.  Denies any neurological changes, weakness, or changes in vision.   Endocrinology: Negative.   Musculoskeletal: Unchanged.  Overall weakness and neck pain.    EXAM:  BP (!) 72/0 (BP Location: Right arm, Patient Position: Chair, Cuff Size: Adult Regular)  Pulse 64  Temp 98.1  F (36.7  C)  Ht 1.727 m (5' 8\")  Wt 77.1 kg (170 lb)  SpO2 98%  BMI 25.85 kg/m2  General: appears comfortable, alert and articulate, lying in the stretcher.  Head: normocephalic, atraumatic  Eyes: anicteric sclera, EOMI  Neck: no adenopathy, no carotid bruits, neck supple.  Orophyarynx: moist mucosa, no lesions, dentition intact  Heart: LVAD hum present, regular, S1/S2, no murmur, gallop, rub, estimated JVP approximately 9 cm  Lungs: clear, no rales or wheezing, decreased to bases.   Abdomen: soft, non-tender, bowel sounds present, no hepatomegaly.  Driveline CDI.    Extremities: no clubbing, cyanosis or edema  Neurological: normal speech and affect, no gross motor deficits    Labs:  CBC RESULTS:  Lab Results   Component Value Date    WBC 6.7 09/06/2017    RBC 3.40 (L) 09/06/2017    HGB 9.8 (L) 09/06/2017    HCT 32.2 (L) 09/06/2017    MCV 95 09/06/2017    MCH 28.8 09/06/2017    MCHC 30.4 (L) 09/06/2017    RDW 19.1 (H) 09/06/2017     09/06/2017       CMP RESULTS:  Lab Results   Component Value Date     09/06/2017    POTASSIUM 4.0 09/06/2017    CHLORIDE 110 (H) 09/06/2017    CO2 23 09/06/2017    ANIONGAP 7 09/06/2017     (H) 09/06/2017    BUN 20 09/06/2017    CR 2.02 (H) 09/06/2017    GFRESTIMATED 33 (L) 09/06/2017    GFRESTBLACK 40 (L) 09/06/2017    JOSÉ 8.4 (L) 09/06/2017    BILITOTAL 0.6 09/06/2017    ALBUMIN 3.0 (L) 09/06/2017    ALKPHOS 136 09/06/2017    ALT 20 09/06/2017    AST 52 (H) 09/06/2017        INR RESULTS:  Lab Results   Component " Value Date    INR 1.74 (H) 09/06/2017       No components found for: CK  Lab Results   Component Value Date    MAG 1.8 06/29/2017     Lab Results   Component Value Date    NTBNP 1687 (H) 11/11/2015       Most recent echocardiogram 8/26/17:  Limited LVAD study.  The LV appears aggressively decompressed. The inflow cannula is in close proximity with the septum, more prominently than on study 8/9/2017.  The RV is normal in size or only marginally dilated. RV function is low normal.  The IVC is small    Assessment and Plan:    Sohan is a 67 year old male with ICM s/p LVAD HM II placement who appears to be well compensated from a heart failure perspective. I have made no changes to his medications.  He will follow up as scheduled in three months or sooner if his symptoms change or worsen in the interim.      1.  Chronic systolic heart failure secondary to ICM.  Stage D  NYHA Class II  ACEi/ARB: yes, Continue Losartan 12.5 mg daily.  BB: yes, continue Toprol XL 50 mg in the am, and 25 mg in the pm.   Aldosterone antagonist: contraindicated due to renal dysfunction  SCD prophylaxis: ICD  Fluid status: euvolemic    2.  S/P LVAD implant as DT   Anticoagulation: Warfarin INR goal of 1.8-2.3 secondary to recurrent GIB and gross hematuria.  Antiplatelet:  Deferred secondary to GIB and gross hematuria.  MAP:  Controlled at 72.    LDH:  Not obtained, as his LDH trends are always high secondary to hemolysis. Last  on 8/28.  VAD Interrogation today: VAD interrogation reviewed with VAD coordinator. Agree with findings.  Several  PI events noted, with no power spikes, speed drops, or other findings suspicious of pump malfunction noted.   1). PUMP DATA  Primary controller serial number: [id number]  HM II:   Flow: --- L/min,    Speed: 8800 RPMs,     PI: 4.5 ,  Power: 5 Slaughter,       Primary controller   Back up battery: Patient use: n/a, Replace in: n/a  Months     Data downloaded: No   Equipment and driveline assessed for  damage: Yes      Back up : Serial number: [id number]  Back up battery: Patient use: n/a Replace in: n/a  Months  Programmed settings identical to the settings on the primary controller :Yes       Education complete: Yes   Charge the BACKUP controller s backup battery every 6 months  Perform a self test on BACKUP every 6 months  Change the MPU s batteries every 6 months: n/a  Have equipment serviced yearly (if applicable):Yes     2). ALARMS  Alarms reported by patient since last pump evaluation: No  Alarms or other finding noted during pump data history and alarm download: Yes - several PI events, history only going back 24hr. BP stable, appears euvolemic, no speed drops/changes.      Action Taken:  Reviewed data with patient: Yes        3). DRESSING CHANGE / DRIVELINE ASSESSMENT  Dressing change completed today: No  Appearance of Driveline site: c/d/i per pt report     Driveline stabilization: Method: [employer]  [ Teaching reinforced on need for stabilization of Driveline. ]     3.  CKD:  Trends 1.45 to 2.24.  Creatinine today is 2.02 up from 1.79.  Continue to monitor as he appears euvolemic today.  Avoid nephrotoxins as able.    4.  CAD:  Stable.  Continue statin. ASA deferred secondary to bleeding history.    5.  Gross Hematuria:  Noted on UA today.  Unchanged. His primary MD is following.  Continue to monitor.  Results reviewed with patient and daughter.     6.  Hyperlipidemia:  Taking statin.     7.  Follow up in three months with Dr. Lemons as scheduled.       Vicky RANGEL, CNP  [phone]

## 2017-09-06 NOTE — NURSING NOTE
Chief Complaint   Patient presents with     Follow Up For     VAD Follow up     Vitals were taken and medications were reconciled. Doppler was performed.    YESENIA Fontana  9:52 AM

## 2017-09-08 NOTE — PROGRESS NOTES
ANTICOAGULATION FOLLOW-UP CLINIC VISIT    Patient Name:  Sohan Rendon  Date:  9/8/2017  Contact Type:  Telephone    SUBJECTIVE:     Patient Findings     Positives No Problem Findings           OBJECTIVE    INR   Date Value Ref Range Status   09/08/2017 2.2  Final     Chromogenic Factor 10   Date Value Ref Range Status   08/10/2014 24 (L) 70 - 130 % Final     Comment:     Therapeutic Range:  A Chromogenic Factor 10 level of approximately 20-40%   inversely correlates with an INR of 2-3 for patients receiving Warfarin.   Chromogenic Factor 10 levels below 20% indicate an INR greater than 3 and   levels above 40% indicate an INR less than 2.       Factor 2 Assay   Date Value Ref Range Status   07/21/2014 25 (L) 60 - 140 % Final     Comment:     Analyte Specific Reagents (ASRs) are used in many laboratory tests necessary   for   standard medical care and generally do not require FDA approval.  This test   was   developed and its performance characteristics determined by [facility].  It has not been cleared or approved by   the [place] Food and Drug Administration.         ASSESSMENT / PLAN  INR assessment THER    Recheck INR In: 1 WEEK    INR Location [facility]      Anticoagulation Summary as of 9/8/2017     INR goal    Prior goal 1.8-2.5   Today's INR 2.2   Maintenance plan 3 mg (3 mg x 1) every day   Full instructions 3 mg every day   Weekly total 21 mg   Plan last modified Sarah Martinez, RN (8/21/2017)   Next INR check 9/15/2017   Priority INR   Target end date Indefinite    Indications   LVAD (left ventricular assist device) present [Z95.811]  Long-term (current) use of anticoagulants [Z79.01] [Z79.01]         Anticoagulation Episode Summary     INR check location     Preferred lab     Send INR reminders to [facility]    Comments Goal Range is 1.8-2.3  [facility] comes out to draw INR  Sofya Ph [phone]  Daughter Almaz Ph [phone]       Anticoagulation Care Providers     Provider Role Specialty Phone number    Evon Lemons MD Responsible Cardiology [phone]            See the Encounter Report to view Anticoagulation Flowsheet and Dosing Calendar (Go to Encounters tab in chart review, and find the Anticoagulation Therapy Visit)  Spoke with Sofya [facility] nurse.    Dafne Sanders RN

## 2017-09-08 NOTE — MR AVS SNAPSHOT
Sohan [facility]   9/8/2017   Anticoagulation Therapy Visit    Description:  67 year old male   Provider:  Dafne Sanders RN   [facility]           INR as of 9/8/2017     Today's INR 2.2      Anticoagulation Summary as of 9/8/2017     INR goal    Prior goal 1.8-2.5   Today's INR 2.2   Full instructions 3 mg every day   Next INR check 9/15/2017    Indications   LVAD (left ventricular assist device) present [Z95.811]  Long-term (current) use of anticoagulants [Z79.01] [Z79.01]         September 2017 Details    Sun Mon Tue Wed Thu Fri Sat          1               2                 3               4               5               6               7               8      3 mg   See details      9      3 mg           10      3 mg         11      3 mg         12      3 mg         13      3 mg         14      3 mg         15            16                 17               18               19               20               21               22               23                 24               25               26               27               28               29               30                Date Details   09/08 This INR check       Date of next INR:  9/15/2017         How to take your warfarin dose     To take:  3 mg Take 1 of the 3 mg tablets.

## 2017-09-12 NOTE — Clinical Note
I just entered my third order from Nephrology referral.  I don't know why these aren't getting scheduled, and daughter is unable to tell me, but daughter requests that our office actually schedule a morning appointment on any day of the week, and then convey the date and time to them, so we don't miss our opportunity for him to be seen.  I also ordered heel protective boots please.  Please keep the 10/10/17 follow-up.

## 2017-09-12 NOTE — PROGRESS NOTES
SUBJECTIVE:     Sohan Rendon is a 67 year old male who has an LVAD due to severe ischemic cardiomyopathy. He has had recurrent embolic strokes, despite careful anticoagulation, with the LVAD believed to be the source. He was hospitalized 9/23 - 10/4/14 for acute ischemic subcortical stroke, superimposed upon previous R PICA and R MCA strokes. During that hospital stay, several discussions were carried out with the patient's family regarding the possibility of LVAD removal and conversion to Hospice. Per my discussion with his attending cardiologist, Dr. Lemons, family are well aware of the patient's terminal condition, but they steadfastly decline enrolling him into Hospice. They have wished to continue LVAD and other cares at home, while ensuring his comfort. Dr. Lemons made a referral for this patient to be seen at home by the [facility] to provide primary care management, though the LVAD/Cardiology clinics will remain in charge of his cardiology care.       Mr. Rendon was seen today in his home along with a daughter and an  for the above issues, as well as for the following health issues:         Heart Failure Follow-up    Patient with end-stage cardiomyopathy, Stage D, [facility] IIIB. LVAD in place. History of recurrent emboli from LVAD despite careful anticoagulation. Patient is not a candidate for device revision or exchange per Cardiology notes.    Symptoms: No recent recurrence of vague left anterior chest pain that can persist for days at a time, and which he and I have previously attributed to muscular strain.    Shortness of breath: Happens at rest infrequently, but not worse.    Lower extremity edema: No more than trace pedal edema at baseline, currently stable.    Chest pain: See above.    Using more pillows than normal: N/A; has adjustable (hospital) bed.    Cough at night: Yes- occasional cough, present for months, believed to be due to non-cardiac causes.    Weight: Not checking weight  daily; patient unable to weight bear.    Medication side effects: None described to cardiac meds.    Cardiology visits, ER/UC, or hospital admissions since last visit: seen most recently 9/6/17 in Cardiology follow-up, portions of that note:  Assessment and Plan:    Sohan is a 67 year old male with ICM s/p LVAD HM II placement who appears to be well compensated from a heart failure perspective. I have made no changes to his medications.  He will follow up as scheduled in three months or sooner if his symptoms change or worsen in the interim.       1.  Chronic systolic heart failure secondary to ICM.  Stage D  NYHA Class II  ACEi/ARB: yes, Continue Losartan 12.5 mg daily.  BB: yes, continue Toprol XL 50 mg in the am, and 25 mg in the pm.   Aldosterone antagonist: contraindicated due to renal dysfunction  SCD prophylaxis: ICD  Fluid status: euvolemic     2.  S/P LVAD implant as DT   Anticoagulation: Warfarin INR goal of 1.8-2.3 secondary to recurrent GIB and gross hematuria.  Antiplatelet:  Deferred secondary to GIB and gross hematuria.  MAP:  Controlled at 72.    LDH:  Not obtained, as his LDH trends are always high secondary to hemolysis. Last  on 8/28.  VAD Interrogation today: VAD interrogation reviewed with VAD coordinator. Agree with findings.  Several  PI events noted, with no power spikes, speed drops, or other findings suspicious of pump malfunction noted.   1). PUMP DATA  Primary controller serial number: [id number]  HM II:   Flow: --- L/min,    Speed: 8800 RPMs,     PI: 4.5 ,  Power: 5 Slaughter,        Primary controller   Back up battery: Patient use: n/a, Replace in: n/a  Months     Data downloaded: No   Equipment and driveline assessed for damage: Yes       Back up : Serial number: [id number]  Back up battery: Patient use: n/a Replace in: n/a  Months  Programmed settings identical to the settings on the primary controller :Yes        Education complete: Yes   Charge the BACKUP  controller s backup battery every 6 months  Perform a self test on BACKUP every 6 months  Change the MPU s batteries every 6 months: n/a  Have equipment serviced yearly (if applicable):Yes      2). ALARMS  Alarms reported by patient since last pump evaluation: No  Alarms or other finding noted during pump data history and alarm download: Yes - several PI events, history only going back 24hr. BP stable, appears euvolemic, no speed drops/changes.       Action Taken:  Reviewed data with patient: Yes          3). DRESSING CHANGE / DRIVELINE ASSESSMENT  Dressing change completed today: No  Appearance of Driveline site: c/d/i per pt report      Driveline stabilization: Method: [employer]  [ Teaching reinforced on need for stabilization of Driveline. ]      3.  CKD:  Trends 1.45 to 2.24.  Creatinine today is 2.02 up from 1.79.  Continue to monitor as he appears euvolemic today.  Avoid nephrotoxins as able.     4.  CAD:  Stable.  Continue statin. ASA deferred secondary to bleeding history.     5.  Gross Hematuria:  Noted on UA today.  Unchanged. His primary MD is following.  Continue to monitor.  Results reviewed with patient and daughter.      6.  Hyperlipidemia:  Taking statin.      7.  Follow up in three months with Dr. Lemons as scheduled.         Vicky Ziegler APRN, CNP  [phone]                      Cerebrovascular Follow-up    History of multiple embolic strokes, LVAD source, despite warfarin anticoagulation. Most recent documented stroke September 2014.    Residual symptoms: Dysarthria, dysphagia, left arm and leg weakness. Dysarthria has improved remarkably. Dense left hemiparesis is unimproved. Dysphagia has improved to permit PO feeding of an unrestricted texture diet.  Diagnosed with new-onset seizure disorder 6/2015; see below.    Worsened or new symptoms since last visit: None.    Daily aspirin use: Stopped during 9/2016 hospital stay for gross hematuria and remains on hold.  Warfarin has continued,  "though inpatient Urology notes suggest a more conservative INR target of 1.8 to 2.3 instead of the prior Cardiology target of 2.5 to 3.0 (indication: history of recurrent stroke associated with LVAD).  Dr. Lemons, Cardiology, has advised that we should continue to hold ASA, and continue to aim for INR 1.8 to 2.3, until further notice.          Seizures    Duration: Initially diagnosed during hospitalization of 6/2015. Was noted to have seizure in-house, levetiracetam started.    Description (location/character/radiation): Described as \"focal onset epilepsy\" in Neuro consultation.    Intensity: No witnessed seizure activity since return home from 6/2015 admission.    History (similar episodes/previous evaluation): Neuro consultation during hospital stay 6/2015. No neuro follow-up scheduled.    Precipitating or alleviating factors: History of multiple embolic CVA due to LVAD thrombosis, most recently 9/2014.    Therapies tried and outcome: Levetiracetam 500 mg BID started 6/2015, no witnessed seizure activity since Rx started.                                                                                     Amount of exercise or physical activity: None; essentially bed bound. Family says he can stand for brief periods of time if supported, cannot walk. There is a Jay lift at home used for transfer to chair or wheelchair. Family has finally received a replacement manual wheelchair.     Problems taking medications regularly: Has some mild dysphagia, but is not missing doses. Meds administered by daughters.    Medication side effects: Lethargic occasionally; daughter and I wonder if this could represent medication side effect.    Diet: regular (no restrictions). Daughters feed him. No witnessed choking or aspiration.          Hyperlipidemia Follow-Up        Rate your low fat/cholesterol diet?: Not carefully monitoring fat, but meals prepared by daughters are generally low fat.    Taking statin?  Yes, atorvastatin. "  Reports no myalgia.    Other lipid medications/supplements?:  None.       Chronic Kidney Disease         Current NSAID use?  None.    GFR reviewed over past several months: GFR 50 to 53 2/2017, has demonstrated gradual decline since, currently 36 to 38.    I made nephrology referrals at my last two visits in 7/2017 and 8/2017; daughter reports these did not get scheduled.      Neck Pain        Duration: Last few months.    Description: Point tenderness over upper trapezius on either side of the midline, reproducible when I press on it with one finger.  Had no nuchal rigidity, no anterior neck pain.    Intensity:  Mild and intermittent over the past month.    Accompanying signs and symptoms: None.    History (similar episodes/previous evaluation): Bed bound due to complications of stroke.  Has previously reported occipital and right nuchal pain.    Precipitating or alleviating factors: I wonder if bed bound status and positioning of head up on pillow contribute.    Therapies tried and outcome: Acetaminophen has been helpful, and family massages the area.  No other Rx has been necessary.    Problem list and histories reviewed & adjusted, as indicated.  Additional history: as documented    BP Readings from Last 3 Encounters:   09/12/17 92/60   09/06/17 (!) 72/0   08/29/17 106/82    Wt Readings from Last 3 Encounters:   09/06/17 170 lb (77.1 kg)   08/27/17 194 lb 0.1 oz (88 kg)   08/09/17 182 lb (82.6 kg)                  Labs reviewed in EPIC      Reviewed and updated as needed this visit by clinical staff       Reviewed and updated as needed this visit by Provider       ROS:  Obtained both from patient's daughters, as well as the patient himself via .   CONSTITUTIONAL: NEGATIVE for chills, fever, sweats.   EYES: Reports ongoing mild itching and pain from OD, ongoing use of ketotifen has been helpful.  ENT/MOUTH: NEGATIVE for nasal congestion, sinus pressure. No recurrence of epistaxis since last  visit.  RESP: NEGATIVE for dyspnea, wheeze, or respiratory chest pain. Cough has been mild/minimal since last visit, productive of scant clear sputum.  CV: NEGATIVE for orthopnea, or worsened lower extremity edema.   GI: NEGATIVE for recurrence of abdominal pain, no nausea or emesis.  Constipation is now generally well-controlled on Senna 1 or 2 per day; has had daily BM.  : Mcarthur was removed by Urology 5/2017.  NEGATIVE for hematuria, dysuria, difficulties urinating, or flank pain.  Daughter remains very focused on urine color, presumes infection if urine becomes dark, not currently a problem.  MUSCULOSKELETAL: NEGATIVE for change or worsening in right hip pain.  Neck pain described above.      INTEG: No rashes reported.  NEURO: NEGATIVE for new or worsened focal numbness or weakness or syncope.    PSYCHIATRIC: NEGATIVE for changes in mild baseline anxiety, no panic, no recent change in mood.      OBJECTIVE:     BP 92/60  Pulse 56  Resp 14  SpO2 99% on room air.  There is no height or weight on file to calculate BMI.    GENERAL: Appears clean, comfortable, and sleepy, propped upright in lift chair.  EYES: Eyes grossly normal to inspection, no conjunctivitis or discharge.  HENT: Nares patent by sniff, no discharge.    NECK: Trachea midline, no stridor.    RESP: No accessory muscle use. A couple of coughs during this visit, sputum white and foamy. Symmetrical breath sounds. Lungs clear throughout on inspiration and expiration. Expiration not prolonged, no wheeze.  CV: Regular rate and rhythm, non-tachycardic. Prominent LVAD noise, which sounds like someone is running a vacuum  in the near background, makes exam difficult. S1 S2 softly audible, no murmur or extra sound. No lower extremity edema. Extremities slightly cool x4.  ABDOMEN: LVAD entry site not undressed for exam. Protuberant, though soft, non-tender, no guarding over all quadrants. Liver and spleen not palpable, no masses palpable. Bowel sounds  "positive.  MS: No bony deformities noted.  No red or inflamed joints.  See neck and upper back exam above.  SKIN: Warm and dry, no rashes where skin visible.    NEURO: Mostly alert, but dozed off a couple of times, conversant, oriented and appropriate in conversation per , though some very mild dysarthria present. Mild left facial droop noted, cranial nerves II - XII otherwise grossly intact.  Dense left upper and lower extremity paresis persists.  PSYCH: Calm, conversant. Language barrier prevents good exam, though affect appears normal.    Diagnostic Test Results:  Results for orders placed or performed in visit on 09/08/17   INR   Result Value Ref Range    INR 2.2      *Note: Due to a large number of results and/or encounters for the requested time period, some results have not been displayed. A complete set of results can be found in Results Review.       ASSESSMENT/PLAN:     (N18.3) Chronic kidney disease  Comment: GFR has demonstrated gradual decline over past 6 months, from around 52 to around 38.  Unclear why this is happening: worsened heart function vs ARB therapy with losartan vs embolic disease (he has previous embolism from LVAD to brain) vs other.  Is on no nephrotoxins.  He has no significant metabolic abnormalities due to renal failure, and I doubt his anemia is due to renal disease at GFR 38.  Plan: Daughters did not schedule the nephrology visit I scheduled 7/2017.  Will send a new referral, and assist them with making an appointment.  Cardiology advises continuation of losartan 12.5 mg QD; given stability in GFR over past two months that seems reasonable.     (S42.153I) Trapezius strain, left, subsequent encounter    Comment: Patient's \"neck pain\" is actually point tenderness, reproducible my manual pressure, over the upper trapezius on either side of the midline.  Pain has been generally mild over the past month.  Plan: Continue OTC Aspercreme topically PRN, massage delivered by " family, and perhaps not propping his head quite so forward on pillows.  Follow progress.      (R33.8) Urinary retention due to (N40.1) Benign non-nodular prostatic hyperplasia with lower urinary tract symptoms  Comment: Patient was unable to void in-hospital, so Mcarthur was placed, then subsequently removed 5/2017 at outpatient Urology visit.  Urology has started finasteride and tamsulosin, both of which continue.  Plan: Missed Urology follow-up scheduled 6/2017 due to hospitalization; daughter has not called to reschedule.  Finasteride and tamsulosin continue.      (D62) Acute posthemorrhagic anemia  Comment: Has element of (D50.0) Chronic anemia due to iron dieficiency, baseline Hgb has been in the 11's.  GI blood loss during 5/2017 hospital stay resulted in doris Hgb of 8.4 and discharge Hgb of 8.7.  He is having no melena or other blood loss.  He remains on warfarin, target INR 1.8 to 2.3 per Cardiology (lower INR selected initially due to gross hematuria).  Serum Fe was 44 on 6/22/17 labs, which were done one month after oral FeSO4 were stopped.  Hgb remains at recent baseline of 9.7.  Plan: Follow Hgb, recheck iron in a few months.  May need to restart FeSO4 if Hgb declines and/or if serum Fe drops.      (I50.22) CHF (congestive heart failure), NYHA class IV, (Z95.811) LVAD (left ventricular assist device) present  Comment: Patient has recurrent embolic events from his LVAD, including recurrent stroke, despite careful anticoagulation. His LVAD cannot be exchanged, per my conversation with Dr. Lemons, Cardiology. Heart failure overall appears to be well-compensated today. VAD interrogated during recent cardiology visit and hospital stay, no abnormalities found.  See note from [facility] follow-up 9/6/17 above; no medication changes were made.  Plan: Continue current care, though focusing on palliative care. He continues on warfarin as directed by VAD Clinic. Continue to defer management of rhythm, hemodynamics,  anticoagulation to the Cardiology/LVAD Clinic.  Previous INR target was 2.5 to 3.0 per Cardiology, but a lower target INR of 1.8 to 2.3 is currently being used due to recurrent gross hematuria.  ASA reamains on hold at Cardiology advice due to hematuria.  These INR and ASA modifications are to continue until further notice, per message from Cardiology.  Next Cardiology follow-up approx 12/2017.        (R56.9) Focal onset epilepsy  Comment: Seizure at home 6/2015 prompted hospitalization, patient now on levetiracetam. No additional seizure activity noted since Rx started.   Plan: Continue levetiracetam 500 mg Q12H, observe for seizure recurrence.        (I63.411) Cerebrovascular accident 9/2014 due to embolism of R MCA  Comment: Residual deficits include dysarthria, which has improved remarkably, dysphagia (see below), dense left hemiparesis, and new-onset epilepsy as of 6/2015. Given that source of embolic strokes is from LVAD, and that embolism cannot be prevented despite anticoagulation, future events are possible or even likely. He had what appears to have been a TIA during 4/2016 hospital stay when warfarin and ASA were held due to gross hematuria, but subsequent neuro workup did not reveal new stroke.  Neuro status has been stable since that time.  Plan: Warfarin anticoagulation continues per Cardiology, ASA held due to hematuria, details above.  Observe for new events.      (R13.10) Dysphagia  Comment: Had bedside swallow evaluation from Speech Therapy 12/2014. He had timbo aspiration only to thin liquids. Per daughters, although they have to feed him due to weakness from stroke, he appears to swallow without choking or aspiration episodes.  The only dysphagia they notice is to large meds like levetiracetam.  Plan: Continue speech therapy recommendations: patient should be sitting straight up when eating, take small bolus sizes, eat slowly, not talk during eating. Continue PO diet.      (E78.5)  Hyperlipidemia  Comment: Remains on atorvastatin, lipids well-controlled on 6/22/17 labs.  Plan:  Continue atorvastatin.    CONSULTATION/REFERRAL to Nephrology again made.    FUTURE APPOINTMENTS:       - Follow-up visit scheduled for 10/10/17 at 1400.    Face to face time was 44 minutes, at least 50% of which was spent in counseling regarding management of renal disease, anemia, heart failure, neck pain, and residual deficits from stroke.      Thomas Dill MD  [facility]

## 2017-09-12 NOTE — PATIENT INSTRUCTIONS
1.  I just made a new Nephrology (kidney doctor) referral.  I'll see if they'll allow us to schedule an actual appointment date and time for you so we don't keep missing these.    2.  We made no medication changes today.    3.  I ordered heel protective cushions, which hopefully can be delivered right to your home.    4.  I'll be back to see you next on Tuesday, October 10th at 2 PM.

## 2017-09-12 NOTE — MR AVS SNAPSHOT
After Visit Summary   9/12/2017    Soahn Rendon    MRN: [id number]           Patient Information     Date Of Birth          1950        Visit Information        Provider Department      9/12/2017 2:00 PM Thomas Dill MD; [employer] [facility]        Today's Diagnoses     LVAD (left ventricular assist device) present    -  1    CHF (congestive heart failure), NYHA class IV, chronic, systolic (H)        Hyperlipidemia, unspecified hyperlipidemia type        Benign prostatic hyperplasia with urinary retention        Stage 3 chronic kidney disease        Oropharyngeal dysphagia        Seizure (H)        Slow transit constipation        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  I just made a new Nephrology (kidney doctor) referral.  I'll see if they'll allow us to schedule an actual appointment date and time for you so we don't keep missing these.    2.  We made no medication changes today.    3.  I ordered heel protective cushions, which hopefully can be delivered right to your home.    4.  I'll be back to see you next on Tuesday, October 10th at 2 PM.          Follow-ups after your visit        Additional Services     NEPHROLOGY ADULT REFERRAL       Your provider has referred you to: [facility]: Kidney (Nephrology) Clinic [facility] [phone]   [email or web address]    Please be aware that coverage of these services is subject to the terms and limitations of your health insurance plan.  Call member services at your health plan with any benefit or coverage questions.      Reason for referral:  Unexplained decline in eGFR > 15 ml/min between two readings. Patient has end-stage CHF with LVAD in place, though  cardiac disease has been stable for last few months.  He has a history of embolization from LVAD, but none documented to kidney (he has had a R MCA stroke).   Cardiology is interested in keeping losartan going if possible.  Thank you.    Please bring the following to your appointment:    >>   Any x-rays, CTs or MRIs which have been performed.  Contact the facility where they were done to arrange for  prior to your scheduled appointment.   >>   List of current medications   >>   This referral request   >>   Any documents/labs given to you for this referral                  Your next 10 appointments already scheduled     Oct 10, 2017  2:00 PM CDT   Return Visit with Thomas Dill MD   [facility] ([facility])    [address]  [address]  [facility] [address] [phone]            Nov 14, 2017  2:00 PM CST   Return Visit with Thomas Dill MD   [facility] ([facility])    [address]  [address]  [facility] [address]   [phone]            Nov 17, 2017  7:30 AM CST   Lab with  LAB   [facility] ([facility])    [address] [place] [address]  [address] [facility] [address]   [phone]            Nov 17, 2017  8:30 AM CST   (Arrive by 8:00 AM)   New Patient Visit with Jaleel Guy MD   [facility] ([facility])    [address]  3rd Floor  [facility] [address]   [phone]            Dec 12, 2017  2:00 PM CST   Return Visit with Thomas Dill MD   [facility] ([facility])    [address]  [address]  [facility] [address]   [phone]            Dec 13, 2017 10:40 AM CST   (Arrive by 10:25 AM)   Ventricular Assist Device with Evon Lemons MD   [facility] ([facility])    [address] [place] [address]  [address] [facility] [address]  "  [phone]            Mar 09, 2018 11:00 AM CST   (Arrive by 10:45 AM)   Ventricular Assist Device with CLAIRE Escobar [facility] ([facility] [place])    [address] [facility]  3rd [facility] [address] [phone]              Who to contact     If you have questions or need follow up information about today's clinic visit or your schedule please contact [facility] directly at [phone].  Normal or non-critical lab and imaging results will be communicated to you by MyChart, letter or phone within 4 business days after the clinic has received the results. If you do not hear from us within 7 days, please contact the clinic through [employer] or phone. If you have a critical or abnormal lab result, we will notify you by phone as soon as possible.  Submit refill requests through [employer] or call your pharmacy and they will forward the refill request to us. Please allow 3 business days for your refill to be completed.          Additional Information About Your Visit        [employer] Information     [employer] lets you send messages to your doctor, view your test results, renew your prescriptions, schedule appointments and more. To sign up, go to [email or web address] . Click on \"Log in\" on the left side of the screen, which will take you to the Welcome page. Then click on \"Sign up Now\" on the right side of the page.     You will be asked to enter the access code listed below, as well as some personal information. Please follow the directions to create your username and password.     Your access code is: [email or web address]  Expires: 2017  4:12 PM     Your access code will  in 90 days. If you need help or a new code, please call your [place] clinic or [phone].        Care EveryWhere ID     This is your Care EveryWhere ID. This could be used by other organizations to access your [place] medical records  [id number]        Your Vitals Were "     Pulse Respirations Pulse Oximetry             56 14 99%          Blood Pressure from Last 3 Encounters:   09/12/17 92/60   09/06/17 (!) 72/0   08/29/17 106/82    Weight from Last 3 Encounters:   09/06/17 170 lb (77.1 kg)   08/27/17 194 lb 0.1 oz (88 kg)   08/09/17 182 lb (82.6 kg)              We Performed the Following     NEPHROLOGY ADULT REFERRAL          Today's Medication Changes          These changes are accurate as of: 9/12/17 11:59 PM.  If you have any questions, ask your nurse or doctor.               These medicines have changed or have updated prescriptions.        Dose/Directions    acetaminophen 325 MG tablet   Commonly known as:  TYLENOL   This may have changed:    - when to take this  - reasons to take this   Used for:  Femoral neck fracture, right, closed, initial encounter        Dose:  650 mg   Take 2 tablets (650 mg) by mouth every 6 hours   Quantity:  100 tablet   Refills:  0       * order for DME   This may have changed:  Another medication with the same name was added. Make sure you understand how and when to take each.   Used for:  Fracture of femoral neck, right, closed, initial encounter, Stroke (H), CHF (congestive heart failure), NYHA class IV (H)        Equipment being ordered: Lift chair.  Please see indications and face-to-face encounter details in 2/3/15 Office Visit note.   Quantity:  1 Device   Refills:  0       * order for DME   This may have changed:  Another medication with the same name was added. Make sure you understand how and when to take each.   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   Quantity:  2 each   Refills:  0       * order for DME   This may have changed:  Another medication with the same name was added. Make sure you understand how and when to take each.    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   Quantity:  1 each   Refills:  0       * order for DME   This may have changed:  Another medication with the same name was added. Make sure you understand how and when to take each.   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   Quantity:  1 each   Refills:  0       * order for DME   This may have changed:  Another medication with the same name was added. Make sure you understand how and when to take each.   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   Quantity:  1 each   Refills:  0       * order for DME   This may have changed:  Another medication with the same name was added. Make sure you understand how and when to take each.   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  Please fax to [facility]; I called them 11/26/16 and they requested the new order.  Face to face is in my 10/26/16 note.   Quantity:  1 each   Refills:  0       * order for DME   This may have changed:  Another medication with the same name was added. Make sure you understand how and when to take each.   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   Quantity:  2 each   Refills:  0       * order for DME   This may have changed:  You were already taking a medication with the same name, and this prescription was added. Make sure you understand how and when to take each.   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  Dx: previous stroke with left hemiplegia.  Duration of need: 99 months.   Quantity:  2 each   Refills:  0       simethicone 80 MG chewable tablet   Commonly known as:  MYLICON   This may have changed:    - when to take this  - reasons to take this   Used for:  Slow transit constipation        Dose:  80 mg   Take 1 tablet (80 mg) by mouth 4 times daily   Quantity:  180 tablet   Refills:  5       warfarin 3 MG tablet   Commonly known as:  COUMADIN   This may have changed:    - how much to take  - how to take this  - when to take this  - additional instructions   Used for:  Cerebrovascular accident (CVA) due to embolism of right middle cerebral artery (H)        Take 3mgtonight and tomorrow night. Follow up with coumadin clinic on Tuesday for further dosing   Quantity:  180 tablet   Refills:  3       * Notice:  This list has 8 medication(s) that are the same as other medications prescribed for you. Read the directions carefully, and ask your doctor or other care provider to review them with you.         Where to get your medicines      Some of these will need a paper prescription and others can be bought over the counter.  Ask your nurse if you have questions.     Bring a paper prescription for each of these medications     order for DME                Primary Care Provider Office Phone # Fax #    Thomas Dill [phone]       [address] [place] [address] [address]  [facility] [address]        Equal Access to Services     [facility] BRODY AH: Rocky Rahman, jeana morrison, otto rose,  willie magdaleno mary ann castro'aan ah. So [facility] [phone].    ATENCIÓN: Si mel villagomez, tiene a smith disposición servicios gratuitos de asistencia lingüística. Dayron smith [phone].    We comply with applicable federal civil rights laws and [place] laws. We do not discriminate on the basis of race, color, national origin, age, disability sex, sexual orientation or gender identity.            Thank you!     Thank you for choosing [facility]  for your care. Our goal is always to provide you with excellent care. Hearing back from our patients is one way we can continue to improve our services. Please take a few minutes to complete the written survey that you may receive in the mail after your visit with us. Thank you!             Your Updated Medication List - Protect others around you: Learn how to safely use, store and throw away your medicines at www.disposemymeds.org.          This list is accurate as of: 9/12/17 11:59 PM.  Always use your most recent med list.                   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    Hypoglycemia       bisacodyl 10 MG Suppository    DULCOLAX    5 suppository    Place 1 suppository (10 mg) rectally daily as  needed for constipation    Drug-induced constipation       blood glucose monitoring lancets     1 Box    Use to test blood sugars 2 times daily or as directed.    Diabetes mellitus, type 2 (H)       calcium carbonate-vitamin D 500-400 MG-UNIT Tabs per tablet     90 tablet    Take 1 tablet by mouth daily    Cardiac arrhythmia, unspecified cardiac arrhythmia type       finasteride 5 MG tablet    PROSCAR    90 tablet    Take 1 tablet (5 mg) by mouth daily    Incomplete bladder emptying       furosemide 20 MG tablet    LASIX    20 tablet    Take 1 tablet (20 mg) by mouth as needed    Chronic systolic congestive heart failure (H)       ketotifen 0.025 % Soln ophthalmic solution    ZADITOR/REFRESH ANTI-ITCH    1 Bottle    Place 1 drop into both eyes 2 times daily    Allergic conjunctivitis, bilateral       levETIRAcetam 750 MG tablet    KEPPRA    180 tablet    TAKE 1 TABLET (750 MG) BY MOUTH 2 TIMES DAILY    Convulsions, unspecified convulsion type (H)       loratadine 10 MG tablet    CLARITIN    90 tablet    TAKE 1 TABLET (10 MG) BY MOUTH DAILY    Allergic rhinitis       losartan 25 MG tablet    COZAAR    45 tablet    Take 0.5 tablets (12.5 mg) by mouth daily    CHF (congestive heart failure), NYHA class IV, chronic, systolic (H)       magnesium oxide 400 MG tablet    MAG-OX    14 tablet    Take 1 tablet (400 mg) by mouth 2 times daily    Abdominal distention, Drug-induced constipation       melatonin 3 MG tablet      Take 1 tablet (3 mg) by mouth At Bedtime    Insomnia, unspecified type       nitroGLYcerin 0.4 MG sublingual tablet    NITROSTAT    30 tablet    Place 1 tablet (0.4 mg) under the tongue every 5 minutes as needed for chest pain    Coronary artery disease involving native coronary artery with unstable angina pectoris (H)       nystatin 340173 UNIT/GM Powd    MYCOSTATIN    60 g    Apply to affected areas of skin in the groin and on the scrotum three times a day as needed.        * order for DME     1 Device     Equipment being ordered: Lift chair.  Please see indications and face-to-face encounter details in 2/3/15 Office Visit note.    Fracture of femoral neck, right, closed, initial encounter, Stroke (H), CHF (congestive heart failure), NYHA class IV (H)       * order for DME     2 each    Equipment being ordered: Bilateral leg supports for manual wheelchair.    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  [facility] I called them 11/26/16 and they requested the new order.  Face to face is in my 10/26/16 note.    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   Dx: previous stroke with left hemiplegia.  Duration of need: 99 months.    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, subsequent encounter       pantoprazole 40 MG EC tablet    PROTONIX    180 tablet    Take 1 tablet (40 mg) by mouth daily    Gastrointestinal hemorrhage associated with chronic superficial gastritis       SENEXON-S 8.6-50 MG per tablet   Generic drug:  senna-docusate     60 tablet    TAKE 1-2 TABLETS BY MOUTH 2 TIMES DAILY AS NEEDED FOR CONSTIPATION    Slow transit constipation       simethicone 80 MG chewable tablet    MYLICON    180 tablet    Take 1 tablet (80 mg) by mouth 4 times daily    Slow transit constipation       tamsulosin 0.4 MG capsule    FLOMAX    90 capsule    TAKE 1 CAPSULE EVERY DAY    Incomplete bladder emptying       thiamine 100 MG tablet     90 tablet    TAKE 1 TABLET (100 MG) BY MOUTH DAILY    Cerebrovascular accident (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 Take 25mg by mouth every evening.        warfarin 3 MG tablet    COUMADIN    180 tablet    Take 3mgtonight and tomorrow night. Follow up with coumadin clinic on Tuesday for further dosing    Cerebrovascular accident (CVA) due to embolism of right middle cerebral artery (H)       * Notice:  This list has 8 medication(s) that are the same as other medications prescribed for you. Read the directions carefully, and ask your doctor or other care provider to review them with you.

## 2017-09-13 NOTE — TELEPHONE ENCOUNTER
Schedule nephrology appointment for patient with [facility] at [address]  Soonest available was Nov 17th at 8:30 am (7:30 arrival for labs).    Will relay this information to family.    Faxed order and chart notes for protective heel boots (bilateral) to [facility].

## 2017-09-13 NOTE — TELEPHONE ENCOUNTER
Loratidine 10 mg       Last Written Prescription Date: 7/22/16  Last Fill Quantity: 90,  # refills: 3   Last Office Visit with [facility] or [facility] prescribing provider: 9/12/17                                         Next 5 appointments (look out 90 days)     Oct 10, 2017  2:00 PM CDT   [facility] with Thomas Dill MD   [facility]    [address]  [address]  [facility] [address]   [phone]            Nov 14, 2017  2:00 PM CST   Return Visit with Thomas Dill MD   [facility]    [address]  [address]  [facility] [address]   [phone]            Dec 12, 2017  2:00 PM CST   Return Visit with Thomas Dill MD   [facility]    [address]  [address]  [facility] [address]   [phone]                  Medication refilled per RN protocol.    Concetta Burr, RN

## 2017-09-15 NOTE — PROGRESS NOTES
ANTICOAGULATION FOLLOW-UP CLINIC VISIT    Patient Name:  Sohan Rendon  Date:  9/15/2017  Contact Type:  Telephone    SUBJECTIVE:     Patient Findings     Positives No Problem Findings           OBJECTIVE    INR Protime   Date Value Ref Range Status   09/15/2017 2.0 (A) 0.86 - 1.14 Final     Chromogenic Factor 10   Date Value Ref Range Status   08/10/2014 24 (L) 70 - 130 % Final     Comment:     Therapeutic Range:  A Chromogenic Factor 10 level of approximately 20-40%   inversely correlates with an INR of 2-3 for patients receiving Warfarin.   Chromogenic Factor 10 levels below 20% indicate an INR greater than 3 and   levels above 40% indicate an INR less than 2.       Factor 2 Assay   Date Value Ref Range Status   07/21/2014 25 (L) 60 - 140 % Final     Comment:     Analyte Specific Reagents (ASRs) are used in many laboratory tests necessary   for   standard medical care and generally do not require FDA approval.  This test   was   developed and its performance characteristics determined by [facility].  It has not been cleared or approved by   the US Food and Drug Administration.         ASSESSMENT / PLAN  INR assessment THER    Recheck INR In: 1 WEEK    INR Location [facility]R      Anticoagulation Summary as of 9/15/2017     INR goal    Prior goal 1.8-2.5   Today's INR 2.0   Maintenance plan 3 mg (3 mg x 1) every day   Full instructions 3 mg every day   Weekly total 21 mg   No change documented Amy Godinez, SHAWN   Plan last modified Sarah Martinez, RN (8/21/2017)   Next INR check 9/22/2017   Priority INR   Target end date Indefinite    Indications   LVAD (left ventricular assist device) present [Z95.811]  Long-term (current) use of anticoagulants [Z79.01] [Z79.01]         Anticoagulation Episode Summary     INR check location     Preferred lab     Send INR reminders to [facility]    Comments Goal Range is 1.8-2.3  [facility] comes out to draw INR Case  Manager Sofya  [phone]  Daughter Almaz  [phone]      Anticoagulation Care Providers     Provider Role Specialty Phone number    Evon Lemons MD Responsible Cardiology [phone]            See the Encounter Report to view Anticoagulation Flowsheet and Dosing Calendar (Go to Encounters tab in chart review, and find the Anticoagulation Therapy Visit)    Spoke with patient's home care nurse Sofya.  Gave them their lab results and new warfarin recommendation.  No changes in health, medication, or diet. No missed doses, no falls. No signs or symptoms of bleed or clotting.      Amy Godinez RN

## 2017-09-15 NOTE — MR AVS SNAPSHOT
Sohan [facility]   9/15/2017   Anticoagulation Therapy Visit    Description:  67 year old male   Provider:  Amy Godinez, RN   Department:  [facility]           INR as of 9/15/2017     Today's INR 2.0      Anticoagulation Summary as of 9/15/2017     INR goal    Prior goal 1.8-2.5   Today's INR 2.0   Full instructions 3 mg every day   Next INR check 9/22/2017    Indications   LVAD (left ventricular assist device) present [Z95.811]  Long-term (current) use of anticoagulants [Z79.01] [Z79.01]         September 2017 Details    Sun Mon Tue Wed Thu Fri Sat          1               2                 3               4               5               6               7               8               9                 10               11               12               13               14               15      3 mg   See details      16      3 mg           17      3 mg         18      3 mg         19      3 mg         20      3 mg         21      3 mg         22            23                 24               25               26               27               28               29               30                Date Details   09/15 This INR check       Date of next INR:  9/22/2017         How to take your warfarin dose     To take:  3 mg Take 1 of the 3 mg tablets.
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
## 2017-09-21 NOTE — TELEPHONE ENCOUNTER
Reason for Disposition    Caller requesting a NON-URGENT new prescription or refill and triager unable to refill per unit policy    Protocols used: MEDICATION QUESTION CALL-ADULT-  Caller wants the [place] home care nurse to bring flu shot with her for patient.  Notified [facility] and they said an MD order is needed.   Melissa Parada RN  [facility]

## 2017-09-21 NOTE — TELEPHONE ENCOUNTER
"Call Type: Triage Call    Presenting Problem: Almaz, daughter of Sohan  calling. States a  nurse was to come to his home yesterday to \"Draw blood\" and never  came.  Almaz states she has the phone number for the nurse that comes  to patients home and she will contact nurse now.  Advised to call  back iif any questions or concerns.  Triage Note:  Guideline Title: No Guideline - Advice Per Reference (Adult)  Recommended Disposition: Call Local Agency Immediately  Original Inclination: Would have called clinic  Override Disposition:  Intended Action: Follow advice given  Physician Contacted: No  CALL LOCAL AGENCY IMMEDIATELY ?  YES  SEE ED IMMEDIATELY ? NO  CALL [facility] IMMEDIATELY ? NO  ACTIVATE  ? NO  CALL PROVIDER IMMEDIATELY ? NO  Physician Instructions:  Care Advice:  " Expiration Date (Optional): 02/2020

## 2017-09-22 NOTE — PROGRESS NOTES
ANTICOAGULATION FOLLOW-UP CLINIC VISIT    Patient Name:  Sohan Rendon  Date:  9/22/2017  Contact Type:  Telephone    SUBJECTIVE:     Patient Findings     Positives Unexplained INR or factor level change    Comments Spoke with INO Desouza. Patient has a subtherapeutic INR.  Do not take aspirin intentional order in place.  Has not missed any doses.  Will recheck on Monday at home health visit.  Within protocol of anticoagulation agreement that patient LVAD coordinator need not be informed.  INR goal of 1.8-2.3.           OBJECTIVE    INR   Date Value Ref Range Status   09/22/2017 1.6  Final     Chromogenic Factor 10   Date Value Ref Range Status   08/10/2014 24 (L) 70 - 130 % Final     Comment:     Therapeutic Range:  A Chromogenic Factor 10 level of approximately 20-40%   inversely correlates with an INR of 2-3 for patients receiving Warfarin.   Chromogenic Factor 10 levels below 20% indicate an INR greater than 3 and   levels above 40% indicate an INR less than 2.       Factor 2 Assay   Date Value Ref Range Status   07/21/2014 25 (L) 60 - 140 % Final     Comment:     Analyte Specific Reagents (ASRs) are used in many laboratory tests necessary   for   standard medical care and generally do not require FDA approval.  This test   was   developed and its performance characteristics determined by [facility].  It has not been cleared or approved by   the US Food and Drug Administration.         ASSESSMENT / PLAN  INR assessment SUB    Recheck INR In: 3 DAYS    [facility]R Location [facility]      Anticoagulation Summary as of 9/22/2017     INR goal    Prior goal 1.8-2.5   Today's INR 1.6!   Maintenance plan 3 mg (3 mg x 1) every day   Full instructions 9/22: 4.5 mg; Otherwise 3 mg every day   Weekly total 21 mg   Plan last modified Sarah Martinez, RN (8/21/2017)   Next INR check 9/25/2017   Priority INR   Target end date Indefinite    Indications   LVAD (left ventricular assist  device) present [Z95.811]  Long-term (current) use of anticoagulants [Z79.01] [Z79.01]         Anticoagulation Episode Summary     INR check location     Preferred lab     Send INR reminders to [facility]    Comments Goal Range is 1.8-2.3  [facility] comes out to draw INR  Sofya Ph [phone]  Daughter Almaz Ph [phone]      Anticoagulation Care Providers     Provider Role Specialty Phone number    Evon Lemons MD Responsible Cardiology [phone]            See the Encounter Report to view Anticoagulation Flowsheet and Dosing Calendar (Go to Encounters tab in chart review, and find the Anticoagulation Therapy Visit)    Spoke with INO Desouza. Patient is not on aspirin.  Subtherapeutic dose within range of LVAD agreement, did not inform coordinator.  Dosed patient up one day and will recheck INR on Monday. No changes in health, medication, or diet. No missed doses, no falls. No signs or symptoms of bleed or clotting.    James Nickerson, RN

## 2017-09-22 NOTE — MR AVS SNAPSHOT
Sohan [facility]   9/22/2017   Anticoagulation Therapy Visit    Description:  67 year old male   Provider:  James Nickerson, RN   [facility]           INR as of 9/22/2017     Today's INR 1.6!      Anticoagulation Summary as of 9/22/2017     INR goal    Prior goal 1.8-2.5   Today's INR 1.6!   Full instructions 9/22: 4.5 mg; Otherwise 3 mg every day   Next INR check 9/25/2017    Indications   LVAD (left ventricular assist device) present [Z95.811]  Long-term (current) use of anticoagulants [Z79.01] [Z79.01]         September 2017 Details    Sun Mon Tue Wed Thu Fri Sat          1               2                 3               4               5               6               7               8               9                 10               11               12               13               14               15               16                 17               18               19               20               21               22      4.5 mg   See details      23      3 mg           24      3 mg         25            26               27               28               29               30                Date Details   09/22 This INR check       Date of next INR:  9/25/2017         How to take your warfarin dose     To take:  3 mg Take 1 of the 3 mg tablets.    To take:  4.5 mg Take 1 of the 3 mg tablets and 1.5 of the 1 mg tablets.

## 2017-09-25 NOTE — MR AVS SNAPSHOT
Sohan [facility]   9/25/2017   Anticoagulation Therapy Visit    Description:  67 year old male   Provider:  Sarah Martinez, RN   Department:  [facility]           INR as of 9/25/2017     Today's INR 1.90      Anticoagulation Summary as of 9/25/2017     INR goal    Prior goal 1.8-2.5   Today's INR 1.90   Full instructions 3 mg every day   Next INR check 10/2/2017    Indications   LVAD (left ventricular assist device) present [Z95.811]  Long-term (current) use of anticoagulants [Z79.01] [Z79.01]         September 2017 Details    Sun Mon Tue Wed Thu Fri Sat          1               2                 3               4               5               6               7               8               9                 10               11               12               13               14               15               16                 17               18               19               20               21               22               23                 24               25      3 mg   See details      26      3 mg         27      3 mg         28      3 mg         29      3 mg         30      3 mg          Date Details   09/25 This INR check               How to take your warfarin dose     To take:  3 mg Take 1 of the 3 mg tablets.           October 2017 Details    Sun Mon Tue Wed Thu Fri Sat     1      3 mg         2            3               4               5               6               7                 8               9               10               11               12               13               14                 15               16               17               18               19               20               21                 22               23               24               25               26               27               28                 29               30               31                    Date Details   No additional details    Date of next INR:  10/2/2017         How to take your  warfarin dose     To take:  3 mg Take 1 of the 3 mg tablets.

## 2017-09-25 NOTE — TELEPHONE ENCOUNTER
"Spoke with SHAWN El [facility] who states patient/family report patient having pain on the bottom of his left foot under his great and second toe for the past 3-4 days.  She describes the pain as \"feels like needles\" going into his skin.  Daughter did not feel this was an episode of gout.  Patient has been taking tylenol twice daily but Sienna was unaware of how much relief this gave him.      Daughter is requesting something stronger for the pain.    Routing to provider to review.    Concetta Burr RN    "

## 2017-09-25 NOTE — TELEPHONE ENCOUNTER
Reason for call:  Symptom   Symptom or request: Sienna from [facility] calling to report that patient is having pain in his left foot (needle-like pain). Has been taking tylenol in the AM and PM. Family states that oxycodone helps with pain and would like that started back up or if there are any other recommendations    Phone number to reach patient:  Other phone number:  [phone] (Sienna)    Best Time:  anytime    Can we leave a detailed message on this number?  YES     Mariam [facility]

## 2017-09-25 NOTE — PROGRESS NOTES
ANTICOAGULATION FOLLOW-UP CLINIC VISIT    Patient Name:  Sohan Rendon  Date:  9/25/2017  Contact Type:  Telephone    SUBJECTIVE:     Patient Findings     Positives No Problem Findings           OBJECTIVE    INR   Date Value Ref Range Status   09/25/2017 1.90  Final     Comment:     Home [facility]      Chromogenic Factor 10   Date Value Ref Range Status   08/10/2014 24 (L) 70 - 130 % Final     Comment:     Therapeutic Range:  A Chromogenic Factor 10 level of approximately 20-40%   inversely correlates with an INR of 2-3 for patients receiving Warfarin.   Chromogenic Factor 10 levels below 20% indicate an INR greater than 3 and   levels above 40% indicate an INR less than 2.       Factor 2 Assay   Date Value Ref Range Status   07/21/2014 25 (L) 60 - 140 % Final     Comment:     Analyte Specific Reagents (ASRs) are used in many laboratory tests necessary   for   standard medical care and generally do not require FDA approval.  This test   was   developed and its performance characteristics determined by [facility].  It has not been cleared or approved by   the US Food and Drug Administration.         ASSESSMENT / PLAN  INR assessment THER    Recheck INR In: 1 WEEK    INR Location [facility]R      Anticoagulation Summary as of 9/25/2017     INR goal    Prior goal 1.8-2.5   Today's INR 1.90   Maintenance plan 3 mg (3 mg x 1) every day   Full instructions 3 mg every day   Weekly total 21 mg   Plan last modified Sarah Martinez, RN (8/21/2017)   Next INR check 10/2/2017   Priority INR   Target end date Indefinite    Indications   LVAD (left ventricular assist device) present [Z95.811]  Long-term (current) use of anticoagulants [Z79.01] [Z79.01]         Anticoagulation Episode Summary     INR check location     Preferred lab     Send INR reminders to [facility]    Comments Goal Range is 1.8-2.3  [facility] comes out to draw INR  Sofya Santiago  [phone]  Daughter Almaz  [phone]      Anticoagulation Care Providers     Provider Role Specialty Phone number    Evon Lemons MD Responsible Cardiology [phone]            See the Encounter Report to view Anticoagulation Flowsheet and Dosing Calendar (Go to Encounters tab in chart review, and find the Anticoagulation Therapy Visit)    Spoke with INO El. Gave them new warfarin recommendation.  No changes in health, medication, or diet. No missed doses, no falls. No signs or symptoms of bleed or clotting.     Sarah Martinez RN

## 2017-09-26 PROBLEM — M79.672 LEFT FOOT PAIN: Status: ACTIVE | Noted: 2017-01-01

## 2017-09-26 NOTE — PROGRESS NOTES
Pt's daughter called this evening to report changes in LVAD numbers. They noted starting yesterday that flow is showing +++ and power is 9-10, and PI is down to 1.9-2. Pt reports feeling lethargic. Urine output is clear yellow, per report pt has adequate po intake and has not required any lasix dosing. INR yesterday was 1.9 and he takes coumadin 3mg daily. Denies chest pain or shortness of breath,  endorses slight bloating in abdomen.   Pt has known pump thrombus that has not responded to medical therapy and he is not a pump exchange candidate due to pt's fragility, history of stroke with hemiparesis, and total cares provided by pt's family. Discussed symptoms with Dr. Lemons and agreed that we will not bring pt to hospital to treat these changes in his LVAD numbers, as pt is asymptomatic. If pt does become symptomatic, we will treat symptoms, but for reasons previously listed he will not be considered for pump exchange. Discussed this with pt's daughter, who verbalized understanding.

## 2017-09-26 NOTE — TELEPHONE ENCOUNTER
Spoke with SHAWN El [facility] and gave information regarding gabapentin script.  She will instruct family on med and possible side effects.    Concetta Burr RN

## 2017-09-26 NOTE — TELEPHONE ENCOUNTER
Information noted.  Pain is over the stroke-affected side; presume it is of neuropathic origin.  GFR (black) of 40 limits some treatment options, specifically I don't think NSAID's should be used.  Will start gabapentin at low dose, 100 mg BID, ERx sent.  Please advise daughter as to the side effect of somnolence.    Thanks.

## 2017-10-02 NOTE — MR AVS SNAPSHOT
Sohan [facility]   10/2/2017   Anticoagulation Therapy Visit    Description:  67 year old male   Provider:  Dafne Sanders RN   Department:  [facility]           INR as of 10/2/2017     Today's INR 1.6!      Anticoagulation Summary as of 10/2/2017     INR goal    Prior goal 1.8-2.5   Today's INR 1.6!   Full instructions 10/2: 4.5 mg; 10/3: 4.5 mg; Otherwise 3 mg every day   Next INR check 10/5/2017    Indications   LVAD (left ventricular assist device) present [Z95.811]  Long-term (current) use of anticoagulants [Z79.01] [Z79.01]         October 2017 Details    Sun Mon Tue Wed Thu Fri Sat     1               2      4.5 mg   See details      3      4.5 mg         4      3 mg         5            6               7                 8               9               10               11               12               13               14                 15               16               17               18               19               20               21                 22               23               24               25               26               27               28                 29               30               31                    Date Details   10/02 This INR check       Date of next INR:  10/5/2017         How to take your warfarin dose     To take:  3 mg Take 1 of the 3 mg tablets.    To take:  4.5 mg Take 1.5 of the 3 mg tablets.

## 2017-10-02 NOTE — PROGRESS NOTES
ANTICOAGULATION FOLLOW-UP CLINIC VISIT    Patient Name:  Sohan Rendon  Date:  10/2/2017  Contact Type:  Telephone    SUBJECTIVE:     Patient Findings     Positives No Problem Findings           OBJECTIVE    INR   Date Value Ref Range Status   10/02/2017 1.6  Final     Chromogenic Factor 10   Date Value Ref Range Status   08/10/2014 24 (L) 70 - 130 % Final     Comment:     Therapeutic Range:  A Chromogenic Factor 10 level of approximately 20-40%   inversely correlates with an INR of 2-3 for patients receiving Warfarin.   Chromogenic Factor 10 levels below 20% indicate an INR greater than 3 and   levels above 40% indicate an INR less than 2.       Factor 2 Assay   Date Value Ref Range Status   07/21/2014 25 (L) 60 - 140 % Final     Comment:     Analyte Specific Reagents (ASRs) are used in many laboratory tests necessary   for   standard medical care and generally do not require FDA approval.  This test   was   developed and its performance characteristics determined by [facility].  It has not been cleared or approved by   the US Food and Drug Administration.         ASSESSMENT / PLAN  INR assessment SUB    Recheck INR In: 3 DAYS    INR Location [facility]      Anticoagulation Summary as of 10/2/2017     INR goal    Prior goal 1.8-2.5   Today's INR 1.6!   Maintenance plan 3 mg (3 mg x 1) every day   Full instructions 10/2: 4.5 mg; 10/3: 4.5 mg; Otherwise 3 mg every day   Weekly total 21 mg   Plan last modified Sarah Martinez, RN (8/21/2017)   Next INR check 10/5/2017   Priority INR   Target end date Indefinite    Indications   LVAD (left ventricular assist device) present [Z95.811]  Long-term (current) use of anticoagulants [Z79.01] [Z79.01]         Anticoagulation Episode Summary     INR check location     Preferred lab     [facility]    Comments Goal Range is 1.8-2.3  [facility] comes out to draw INR  Sofya Santiago  [phone]  Daughter Almaz  [phone]      Anticoagulation Care Providers     Provider Role Specialty Phone number    Evon Lemons MD Responsible Cardiology [phone]            See the Encounter Report to view Anticoagulation Flowsheet and Dosing Calendar (Go to Encounters tab in chart review, and find the Anticoagulation Therapy Visit)  Spoke with Sofya [facility] .    Dafne Sanders RN

## 2017-10-05 NOTE — PROGRESS NOTES
ANTICOAGULATION FOLLOW-UP CLINIC VISIT    Patient Name:  Sohan Rendon  Date:  10/5/2017  Contact Type:  Telephone    SUBJECTIVE:     Patient Findings     Positives Unexplained INR or factor level change    Comments Patient has not had any changes in diet, medication or health.           OBJECTIVE    INR   Date Value Ref Range Status   10/05/2017 1.6  Final     Chromogenic Factor 10   Date Value Ref Range Status   08/10/2014 24 (L) 70 - 130 % Final     Comment:     Therapeutic Range:  A Chromogenic Factor 10 level of approximately 20-40%   inversely correlates with an INR of 2-3 for patients receiving Warfarin.   Chromogenic Factor 10 levels below 20% indicate an INR greater than 3 and   levels above 40% indicate an INR less than 2.       Factor 2 Assay   Date Value Ref Range Status   07/21/2014 25 (L) 60 - 140 % Final     Comment:     Analyte Specific Reagents (ASRs) are used in many laboratory tests necessary   for   standard medical care and generally do not require FDA approval.  This test   was   developed and its performance characteristics determined by [facility].  It has not been cleared or approved by   the US Food and Drug Administration.         ASSESSMENT / PLAN  INR assessment SUB    Recheck INR In: 4 DAYS    INR Location [facility]R      Anticoagulation Summary as of 10/5/2017     INR goal    Prior goal 1.8-2.5   Today's INR 1.6!   Maintenance plan 3 mg (3 mg x 1) every day   Full instructions 10/5: 4.5 mg; Otherwise 3 mg every day   Weekly total 21 mg   Plan last modified Sarah Martinez, RN (8/21/2017)   Next INR check 10/9/2017   Priority INR   Target end date Indefinite    Indications   LVAD (left ventricular assist device) present [Z95.811]  Long-term (current) use of anticoagulants [Z79.01] [Z79.01]         Anticoagulation Episode Summary     INR check location     Preferred lab     Send INR reminders to [facility]    Comments Goal Range  is 1.8-2.3  [facility] comes out to draw INR  Sofya Ph [phone]  Daughter Almaz Ph [phone]      Anticoagulation Care Providers     Provider Role Specialty Phone number    Evon Lemons MD Responsible Cardiology [phone]            See the Encounter Report to view Anticoagulation Flowsheet and Dosing Calendar (Go to Encounters tab in chart review, and find the Anticoagulation Therapy Visit)    Spoke with INO Cowart. Gave them their lab results and new warfarin recommendation.  No changes in health, medication, or diet. No missed doses, no falls. No signs or symptoms of bleed or clotting.    James Nickerson, RN

## 2017-10-05 NOTE — MR AVS SNAPSHOT
Sohan Rendon   10/5/2017   Anticoagulation Therapy Visit    Description:  67 year old male   Provider:  James Nickerson RN   Department:  [facility]           INR as of 10/5/2017     Today's INR 1.6!      Anticoagulation Summary as of 10/5/2017     INR goal    Prior goal 1.8-2.5   Today's INR 1.6!   Full instructions 10/5: 4.5 mg; Otherwise 3 mg every day   Next INR check 10/9/2017    Indications   LVAD (left ventricular assist device) present [Z95.811]  Long-term (current) use of anticoagulants [Z79.01] [Z79.01]         October 2017 Details    Sun Mon Tue Wed Thu Fri Sat     1               2               3               4               5      4.5 mg   See details      6      3 mg         7      3 mg           8      3 mg         9            10               11               12               13               14                 15               16               17               18               19               20               21                 22               23               24               25               26               27               28                 29               30               31                    Date Details   10/05 This INR check       Date of next INR:  10/9/2017         How to take your warfarin dose     To take:  3 mg Take 1 of the 3 mg tablets.    To take:  4.5 mg Take 1 of the 3 mg tablets and 1.5 of the 1 mg tablets.

## 2017-10-09 NOTE — MR AVS SNAPSHOT
Sohan [facility]   10/9/2017   Anticoagulation Therapy Visit    Description:  67 year old male   Provider:  Blanca Bah, RN   Department:  [facility]           INR as of 10/9/2017     Today's INR 1.6!      Anticoagulation Summary as of 10/9/2017     INR goal    Prior goal 1.8-2.5   Today's INR 1.6!   Full instructions 10/9: 4.5 mg; 10/10: 4.5 mg; 10/11: 4.5 mg   Next INR check 10/12/2017    Indications   LVAD (left ventricular assist device) present [Z95.811]  Long-term (current) use of anticoagulants [Z79.01] [Z79.01]         October 2017 Details    Sun Mon Tue Wed Thu Fri Sat     1               2               3               4               5               6               7                 8               9      4.5 mg   See details      10      4.5 mg         11      4.5 mg         12            13               14                 15               16               17               18               19               20               21                 22               23               24               25               26               27               28                 29               30               31                    Date Details   10/09 This INR check       Date of next INR:  10/12/2017         How to take your warfarin dose     To take:  4.5 mg Take 1.5 of the 3 mg tablets.

## 2017-10-09 NOTE — PROGRESS NOTES
ANTICOAGULATION FOLLOW-UP CLINIC VISIT    Patient Name:  Sohan Rendon  Date:  10/9/2017  Contact Type:  Telephone    SUBJECTIVE:     Patient Findings     Positives Unexplained INR or factor level change           OBJECTIVE    INR   Date Value Ref Range Status   10/09/2017 1.6  Final     Chromogenic Factor 10   Date Value Ref Range Status   08/10/2014 24 (L) 70 - 130 % Final     Comment:     Therapeutic Range:  A Chromogenic Factor 10 level of approximately 20-40%   inversely correlates with an INR of 2-3 for patients receiving Warfarin.   Chromogenic Factor 10 levels below 20% indicate an INR greater than 3 and   levels above 40% indicate an INR less than 2.       Factor 2 Assay   Date Value Ref Range Status   07/21/2014 25 (L) 60 - 140 % Final     Comment:     Analyte Specific Reagents (ASRs) are used in many laboratory tests necessary   for   standard medical care and generally do not require FDA approval.  This test   was   developed and its performance characteristics determined by [facility].  It has not been cleared or approved by   the US Food and Drug Administration.         ASSESSMENT / PLAN  INR assessment SUB    Recheck INR In: 3 DAYS    INR Location [facility]      Anticoagulation Summary as of 10/9/2017     INR goal    Prior goal 1.8-2.5   Today's INR 1.6!   Maintenance plan No maintenance plan   Full instructions 10/9: 4.5 mg; 10/10: 4.5 mg; 10/11: 4.5 mg   Plan last modified Blanca Bah RN (10/9/2017)   Next INR check 10/12/2017   Priority INR   Target end date Indefinite    Indications   LVAD (left ventricular assist device) present [Z95.811]  Long-term (current) use of anticoagulants [Z79.01] [Z79.01]         Anticoagulation Episode Summary     INR check location     Preferred lab     Send INR reminders to [facility]    Comments Goal Range is 1.8-2.3  [facility] comes out to draw INR  Sofya Ph [phone]  Daughter  Almaz  [phone]      Anticoagulation Care Providers     Provider Role Specialty Phone number    Evon Lemons MD Responsible Cardiology [phone]            See the Encounter Report to view Anticoagulation Flowsheet and Dosing Calendar (Go to Encounters tab in chart review, and find the Anticoagulation Therapy Visit)    Spoke with Sofya MCKINNON.    Blanca Bah RN

## 2017-10-10 PROBLEM — L30.4 INTERTRIGINOUS DERMATITIS ASSOCIATED WITH MOISTURE: Status: ACTIVE | Noted: 2017-01-01

## 2017-10-10 NOTE — MR AVS SNAPSHOT
After Visit Summary   10/10/2017    Sohan Rendon    MRN: [id number]           Patient Information     Date Of Birth          1950        Visit Information        Provider Department      10/10/2017 2:00 PM Thomas Dill MD; [employer] [facility]        Today's Diagnoses     Intertriginous dermatitis associated with moisture    -  1    Left foot pain        LVAD (left ventricular assist device) present        Chronic systolic congestive heart failure, NYHA class 4 (H)        Hyperlipidemia, unspecified hyperlipidemia type        Benign prostatic hyperplasia with urinary retention        Stage 3 chronic kidney disease        Oropharyngeal dysphagia        Seizure (H)        Urinary retention        Iron deficiency anemia due to chronic blood loss          Care Instructions    1.  Please resume gabapentin 100 mg tablets, one tablet by mouth twice daily, in hopes that this will control your foot pain.  Please be advised that sleepiness is a side effect of this medication, though I don't think it will be severe at this low dose.    2.  We made no other medication changes today.    3.  I ordered some labs, which hopefully we can have drawn later this week.    4.  I'm scheduled to come back on Tuesday, November 14th at 2 PM.          Follow-ups after your visit        Your next 10 appointments already scheduled     Nov 14, 2017  2:00 PM CST   Return Visit with Thomas Dill MD   [facility] ([facility])    [address] [place] [address]  [address]  [facility] [id number] [phone]            Nov 17, 2017  7:30 AM CST   Lab with  LAB   [facility] ([facility])    [address]  [facility] [id number] [phone]            Nov 17, 2017  8:30 AM CST   (Arrive by 8:00 AM)   New Patient Visit with Jaleel Guy MD   [facility] ([facility]  [place])    [address] [place] [address]   [phone]            Dec 12, 2017  2:00 PM CST   Return Visit with Thomas Dill MD   [facility] ([facility])    [address]  [address]  [facility] [address]   [phone]            Dec 13, 2017 10:00 AM CST   (Arrive by 9:45 AM)   Implanted Defibulator with Uc Cv Device 1   [facility])    [address] [place] [address]   [phone]            Dec 13, 2017 10:40 AM CST   (Arrive by 10:25 AM)   Ventricular Assist Device with Evon Lemons MD   [facility])    [address] [place] [address]   [phone]            Mar 09, 2018 10:30 AM CST   (Arrive by 10:15 AM)   Implanted Defibulator with Uc Cv Device 1   [facility])    [address] [place] [address]   [phone]            Mar 09, 2018 11:00 AM CST   (Arrive by 10:45 AM)   Ventricular Assist Device with CLAIRE Escobar [facility])    [address] [place] [id number] [phone]              Future tests that were ordered for you today     Open Future Orders        Priority Expected Expires Ordered    Basic metabolic panel  (Ca, Cl, CO2, Creat, Gluc, K, Na, BUN) Routine 10/13/2017 10/27/2017 10/11/2017    CBC with platelets Routine 10/13/2017 10/27/2017 10/11/2017            Who to contact     If you have questions or need follow up information about today's clinic visit or your schedule please contact [facility] directly at [phone].  Normal or non-critical lab and imaging results will be communicated to you by MyChart, letter or phone within 4 business days after the clinic has received the results.  "If you do not hear from us within 7 days, please contact the clinic through [employer] or phone. If you have a critical or abnormal lab result, we will notify you by phone as soon as possible.  Submit refill requests through [employer] or call your pharmacy and they will forward the refill request to us. Please allow 3 business days for your refill to be completed.          Additional Information About Your Visit        [employer] Information     [employer] lets you send messages to your doctor, view your test results, renew your prescriptions, schedule appointments and more. To sign up, go to [email or web address] . Click on \"Log in\" on the left side of the screen, which will take you to the Welcome page. Then click on \"Sign up Now\" on the right side of the page.     You will be asked to enter the access code listed below, as well as some personal information. Please follow the directions to create your username and password.     Your access code is: [email or web address]  Expires: 2017  4:12 PM     Your access code will  in 90 days. If you need help or a new code, please call your [facility] clinic or [phone].        Care EveryWhere ID     This is your Care EveryWhere ID. This could be used by other organizations to access your [facility] medical records  [id number]        Your Vitals Were     Pulse Respirations Pulse Oximetry             60 16 100%          Blood Pressure from Last 3 Encounters:   10/11/17 92/64   17 92/60   17 (!) 72/0    Weight from Last 3 Encounters:   17 170 lb (77.1 kg)   17 194 lb 0.1 oz (88 kg)   17 182 lb (82.6 kg)                 Today's Medication Changes          These changes are accurate as of: 10/10/17 11:59 PM.  If you have any questions, ask your nurse or doctor.               These medicines have changed or have updated prescriptions.        Dose/Directions    acetaminophen 325 MG tablet   Commonly known as:  TYLENOL   This may have changed:    - when to " take this  - reasons to take this   Used for:  Femoral neck fracture, right, closed, initial encounter        Dose:  650 mg   Take 2 tablets (650 mg) by mouth every 6 hours   Quantity:  100 tablet   Refills:  0       simethicone 80 MG chewable tablet   Commonly known as:  MYLICON   This may have changed:    - when to take this  - reasons to take this   Used for:  Slow transit constipation        Dose:  80 mg   Take 1 tablet (80 mg) by mouth 4 times daily   Quantity:  180 tablet   Refills:  5       warfarin 3 MG tablet   Commonly known as:  COUMADIN   This may have changed:    - how much to take  - how to take this  - when to take this  - additional instructions   Used for:  Cerebrovascular accident (CVA) due to embolism of right middle cerebral artery (H)        Take 3mgtonight and tomorrow night. Follow up with coumadin clinic on Tuesday for further dosing   Quantity:  180 tablet   Refills:  3            Where to get your medicines      These medications were sent to [facility]/pharmacy #[id number] - [place], [place] - [address], [place] [address]     Phone:  [phone]     nystatin 868485 UNIT/GM Powd                Primary Care Provider Office Phone # Fax #    Thomas Dill [phone]       [address] [facility] [address]  [facility] [address]        Equal Access to Services     ALCON SKINNER AH: Rocky matuteo Solillieali, waaxda luqadaha, qaybta kaalmada adeegyada, willie quiñonez. So [facility] [phone].    ATENCIÓN: Si habla español, tiene a smith disposición servicios gratuitos de asistencia lingüística. Llame al [phone].    We comply with applicable federal civil rights laws and [place] laws. We do not discriminate on the basis of race, color, national origin, age, disability, sex, sexual orientation, or gender identity.            Thank you!     Thank you for choosing [facility]  for your care. Our goal is always  to provide you with excellent care. Hearing back from our patients is one way we can continue to improve our services. Please take a few minutes to complete the written survey that you may receive in the mail after your visit with us. Thank you!             Your Updated Medication List - Protect others around you: Learn how to safely use, store and throw away your medicines at www.disposemymeds.org.          This list is accurate as of: 10/10/17 11:59 PM.  Always use your most recent med list.                   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    Hypoglycemia       bisacodyl 10 MG Suppository    DULCOLAX    5 suppository    Place 1 suppository (10 mg) rectally daily as needed for constipation    Drug-induced constipation       blood glucose monitoring lancets     1 Box    Use to test blood sugars 2 times daily or as directed.    Diabetes mellitus, type 2 (H)       calcium carbonate-vitamin D 500-400 MG-UNIT Tabs per tablet     90 tablet    Take 1 tablet by mouth daily    Cardiac arrhythmia, unspecified cardiac arrhythmia type       finasteride 5 MG tablet    PROSCAR    90 tablet    Take 1 tablet (5 mg) by mouth daily    Incomplete bladder emptying       furosemide  20 MG tablet    LASIX    20 tablet    Take 1 tablet (20 mg) by mouth as needed    Chronic systolic congestive heart failure (H)       gabapentin 100 MG capsule    NEURONTIN    60 capsule    Take 1 capsule (100 mg) by mouth 2 times daily    Left foot pain       ketotifen 0.025 % Soln ophthalmic solution    ZADITOR/REFRESH ANTI-ITCH    1 Bottle    Place 1 drop into both eyes 2 times daily    Allergic conjunctivitis, bilateral       levETIRAcetam 750 MG tablet    KEPPRA    180 tablet    TAKE 1 TABLET (750 MG) BY MOUTH 2 TIMES DAILY    Convulsions, unspecified convulsion type (H)       loratadine 10 MG tablet    CLARITIN    90 tablet    TAKE 1 TABLET (10 MG) BY MOUTH DAILY    Allergic rhinitis       losartan 25 MG tablet    COZAAR    45 tablet    Take 0.5 tablets (12.5 mg) by mouth daily    CHF (congestive heart failure), NYHA class IV, chronic, systolic (H)       magnesium oxide 400 MG tablet    MAG-OX    14 tablet    Take 1 tablet (400 mg) by mouth 2 times daily    Abdominal distention, Drug-induced constipation       melatonin 3 MG tablet      Take 1 tablet (3 mg) by mouth At Bedtime    Insomnia, unspecified type       nitroGLYcerin 0.4 MG sublingual tablet    NITROSTAT    30 tablet    Place 1 tablet (0.4 mg) under the tongue every 5 minutes as needed for chest pain    Coronary artery disease involving native coronary artery with unstable angina pectoris (H)       nystatin [id number] UNIT/GM Powd    MYCOSTATIN    60 g    Apply to affected areas of skin in the groin and on the scrotum three times a day as needed.    Intertriginous dermatitis associated with moisture       * order for DME     1 Device    Equipment being ordered: Lift chair.  Please see indications and face-to-face encounter details in 2/3/15 Office Visit note.    Fracture of femoral neck, right, closed, initial encounter, Stroke (H), CHF (congestive heart failure), NYHA class IV (H)       * order for DME     2 each    Equipment being ordered: Bilateral leg  supports for manual wheelchair.    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  Please fax to [facility]; I called them 11/26/16 and they requested the new order.  Face to face is in my 10/26/16 note.    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  Dx: previous stroke with left hemiplegia.  Duration of need: 99 months.    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)       oxyCODONE 5 MG IR tablet    ROXICODONE    30 tablet    Take 1 tablet (5 mg) by mouth every 4 hours as needed for moderate to severe pain    Closed fracture of neck of right  femur with nonunion, subsequent encounter       pantoprazole 40 MG EC tablet    PROTONIX    180 tablet    Take 1 tablet (40 mg) by mouth daily    Gastrointestinal hemorrhage associated with chronic superficial gastritis       SENEXON-S 8.6-50 MG per tablet   Generic drug:  senna-docusate     60 tablet    TAKE 1-2 TABLETS BY MOUTH 2 TIMES DAILY AS NEEDED FOR CONSTIPATION    Slow transit constipation       simethicone 80 MG chewable tablet    MYLICON    180 tablet    Take 1 tablet (80 mg) by mouth 4 times daily    Slow transit constipation       tamsulosin 0.4 MG capsule    FLOMAX    90 capsule    TAKE 1 CAPSULE EVERY DAY    Incomplete bladder emptying       thiamine 100 MG tablet     90 tablet    TAKE 1 TABLET (100 MG) BY MOUTH DAILY    Cerebrovascular accident (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 Take 25mg by mouth every evening.        warfarin 3 MG tablet    COUMADIN    180 tablet    Take 3mgtonight and tomorrow night. Follow up with coumadin clinic on Tuesday for further dosing    Cerebrovascular accident (CVA) due to embolism of right middle cerebral artery (H)       * Notice:  This list has 8 medication(s) that are the same as other medications prescribed for you. Read the directions carefully, and ask your doctor or other care provider to review them with you.

## 2017-10-10 NOTE — Clinical Note
I ordered future labs which hopefully we can have drawn later this week.  Please keep the 11/14/17 follow-up.

## 2017-10-10 NOTE — TELEPHONE ENCOUNTER
Amber Contour test strip         Last Written Prescription Date: 7/10/17  Last Fill Quantity: 100, # refills: 2  Last Office Visit with G, P or  Health prescribing provider:  9/12/17   Next 5 appointments (look out 90 days)     Oct 10, 2017  2:00 PM CDT   Return Visit with Thomas Dill MD, [employer]   [facility]    [address]  [address]  [facility] [address]   [phone]            Nov 14, 2017  2:00 PM CST   Return Visit with Thomas Dill MD   [facility]    [address]  [address]  [facility] [address]   [phone]            Dec 12, 2017  2:00 PM CST   Return Visit with Thomas Dill MD   [facility]    [address]  [address]  [facility] [address]   [phone]                   BP Readings from Last 3 Encounters:   09/12/17 92/60   09/06/17 (!) 72/0   08/29/17 106/82     No results found for: MICROL  No results found for: UMALCR  Creatinine   Date Value Ref Range Status   09/06/2017 2.02 (H) 0.66 - 1.25 mg/dL Final   ]  GFR Estimate   Date Value Ref Range Status   09/06/2017 33 (L) >60 mL/min/1.7m2 Final     Comment:     Non  GFR Calc   08/29/2017 38 (L) >60 mL/min/1.7m2 Final     Comment:     Non  GFR Calc   08/28/2017 38 (L) >60 mL/min/1.7m2 Final     Comment:     Non  GFR Calc     GFR Estimate If Black   Date Value Ref Range Status   09/06/2017 40 (L) >60 mL/min/1.7m2 Final     Comment:      GFR Calc   08/29/2017 46 (L) >60 mL/min/1.7m2 Final     Comment:      GFR Calc   08/28/2017 46 (L) >60 mL/min/1.7m2 Final     Comment:      GFR Calc     Lab Results   Component Value Date    CHOL 138 06/22/2017     Lab Results   Component Value Date    HDL 29 06/22/2017     Lab Results   Component Value Date     LDL 80 06/22/2017     Lab Results   Component Value Date    TRIG 143 06/22/2017     No results found for: CHOLHDLRATIO  Lab Results   Component Value Date    AST 52 09/06/2017     Lab Results   Component Value Date    ALT 20 09/06/2017     Lab Results   Component Value Date    A1C 4.8 08/04/2017    A1C 5.2 07/21/2016    A1C 8.0 09/03/2014     Potassium   Date Value Ref Range Status   09/06/2017 4.0 3.4 - 5.3 mmol/L Final     Prescription approved per [facility] Refill Protocol.    Concetta Burr RN

## 2017-10-11 NOTE — PROGRESS NOTES
SUBJECTIVE:     Sohan Rendon is a 67 year old male who has an LVAD due to severe ischemic cardiomyopathy. He has had recurrent embolic strokes, despite careful anticoagulation, with the LVAD believed to be the source. He was hospitalized 9/23 - 10/4/14 for acute ischemic subcortical stroke, superimposed upon previous R PICA and R MCA strokes. During that hospital stay, several discussions were carried out with the patient's family regarding the possibility of LVAD removal and conversion to Hospice. Per my discussion with his attending cardiologist, Dr. Lemons, family are well aware of the patient's terminal condition, but they steadfastly decline enrolling him into Hospice. They have wished to continue LVAD and other cares at home, while ensuring his comfort. Dr. Lemons made a referral for this patient to be seen at home by the [facility] to provide primary care management, though the LVAD/Cardiology clinics will remain in charge of his cardiology care.       Mr. Rendon was seen today in his home along with a daughter and an  for the above issues, as well as for the following health issues:         Heart Failure Follow-up    Patient with end-stage cardiomyopathy, Stage D, [facility] IIIB. LVAD in place. History of recurrent emboli from LVAD despite careful anticoagulation. Patient is not a candidate for device revision or exchange per Cardiology notes.    Symptoms: No recent recurrence of vague left anterior chest pain that can persist for days at a time, and which he and I have previously attributed to muscular strain.    Shortness of breath: Happens at rest infrequently, but not worse.    Lower extremity edema: No more than trace pedal edema at baseline, currently stable.    Chest pain: See above.    Using more pillows than normal: N/A; has adjustable (hospital) bed.    Cough at night: Yes- occasional cough, present for months, believed to be due to non-cardiac causes.    Weight: Not checking weight  "daily; patient unable to weight bear.    Medication side effects: None described to cardiac meds.    Cardiology visits, ER/UC, or hospital admissions since last visit: None.         Cerebrovascular Follow-up    History of multiple embolic strokes, LVAD source, despite warfarin anticoagulation. Most recent documented stroke September 2014.    Residual symptoms: Dysarthria, dysphagia, left hemiparesis. Dysarthria has improved remarkably. Dense left hemiparesis is unimproved. Dysphagia has improved to permit PO feeding of an unrestricted texture diet.  Diagnosed with new-onset seizure disorder 6/2015; see below.    Worsened or new symptoms since last visit: None.    Daily aspirin use: Stopped during 9/2016 hospital stay for gross hematuria and remains on hold.  Warfarin has continued, though inpatient Urology notes suggest a more conservative INR target of 1.8 to 2.3 instead of the prior Cardiology target of 2.5 to 3.0 (indication: history of recurrent stroke associated with LVAD).  Dr. eLmons, Cardiology, has advised that we should continue to hold ASA, and continue to aim for INR 1.8 to 2.3, until further notice.          Seizures    Duration: Initially diagnosed during hospitalization of 6/2015. Was noted to have seizure in-house, levetiracetam started.    Description (location/character/radiation): Described as \"focal onset epilepsy\" in Neuro consultation.    Intensity: No witnessed seizure activity since return home from 6/2015 admission.    History (similar episodes/previous evaluation): Neuro consultation during hospital stay 6/2015. No neuro follow-up scheduled.    Precipitating or alleviating factors: History of multiple embolic CVA due to LVAD thrombosis, most recently 9/2014.    Therapies tried and outcome: Levetiracetam 500 mg BID started 6/2015, no witnessed seizure activity since Rx started.                                                                                     Amount of exercise or physical " activity: None; essentially bed bound. Family says he can stand for brief periods of time if heavily supported, cannot walk. There is a Jay lift at home used for transfer to chair or wheelchair. Family has finally received a replacement manual wheelchair.     Problems taking medications regularly: Has some mild dysphagia, but is not missing doses. Meds administered by daughters.    Medication side effects: Lethargic occasionally; daughter and I wonder if this could represent medication side effect.    Diet: regular (no restrictions). Daughters feed him. No witnessed choking or aspiration.          Hyperlipidemia Follow-Up        Rate your low fat/cholesterol diet?: Not carefully monitoring fat, but meals prepared by daughters are generally low fat.    Taking statin?  Yes, atorvastatin.  Reports no myalgia.    Other lipid medications/supplements?:  None.       Chronic Kidney Disease         Current NSAID use?  None.    GFR reviewed over past several months: GFR was 50 to 53 in 2/2017, has demonstrated gradual decline since, most recently 33 on 9/6/17.    I made nephrology referrals at my last two visits in 7/2017 and 8/2017; daughter reports these did not get scheduled.  He is scheduled for Nephrology visit 11/17/17; I reminded daughter of this again today.    Problem list and histories reviewed & adjusted, as indicated.  Additional history: as documented    BP Readings from Last 3 Encounters:   10/11/17 92/64   09/12/17 92/60   09/06/17 (!) 72/0    Wt Readings from Last 3 Encounters:   09/06/17 170 lb (77.1 kg)   08/27/17 194 lb 0.1 oz (88 kg)   08/09/17 182 lb (82.6 kg)                  Labs reviewed in EPIC      Reviewed and updated as needed this visit by clinical staff       Reviewed and updated as needed this visit by Provider       ROS:  Obtained from patient's daughter, as well as the patient himself via .   CONSTITUTIONAL: NEGATIVE for chills, fever, sweats.   EYES: Reports ongoing mild itching  "and pain from OD, ongoing use of ketotifen has been helpful.  ENT/MOUTH: NEGATIVE for nasal congestion, sinus pressure. No recurrence of epistaxis since last visit.  RESP: NEGATIVE for dyspnea, wheeze, or respiratory chest pain. Cough has been mild/minimal since last visit, productive of scant clear sputum.  CV: NEGATIVE for orthopnea, or worsened lower extremity edema.   GI: NEGATIVE for recurrence of abdominal pain, no nausea or emesis.  Constipation is now generally well-controlled on Senna 1 or 2 per day; has had daily BM.  : Mcarthur was removed by Urology 5/2017.  NEGATIVE for dysuria, difficulties urinating, or flank pain.  Daughter remains very focused on urine color, reports that he had two days of \"dark\" urine last week which she interprets as hematuria, has resolved.  MUSCULOSKELETAL: NEGATIVE for change or worsening in right hip pain.       INTEG: No rashes reported.  NEURO: NEGATIVE for new or worsened focal numbness or weakness or syncope.  More prone to daytime sleepiness over past two months, daughter not concerned.   Has had recurrence of bilateral foot pain described as \"sharp\" and episodic, severe enough that this is his chief complaint today.  His gabapentin is on hold per daughter, she does not know who advised her to hold it or why.  PSYCHIATRIC: NEGATIVE for changes in mild baseline anxiety, no panic, no recent change in mood.    OBJECTIVE:     BP 92/64  Pulse 60  Resp 16  SpO2 100% on room air.  There is no height or weight on file to calculate BMI.    GENERAL: Appears clean, comfortable, and sleepy, in hospital bed.  EYES: Eyes grossly normal to inspection, no conjunctivitis or discharge.  HENT: Nares patent, no discharge.    NECK: Trachea midline, no stridor.    RESP: No accessory muscle use. No cough during this visit.  Symmetrical breath sounds. Lungs clear throughout on inspiration and expiration. Expiration not prolonged, no wheeze.  CV: Regular rate and rhythm, non-tachycardic. " Prominent LVAD noise, which sounds like someone is running a vacuum  in the near background, makes exam difficult. S1 S2 softly audible, no murmur or extra sound. No lower extremity edema. Extremities slightly cool x4.  ABDOMEN: LVAD entry site not undressed for exam. Protuberant, though soft, non-tender, no guarding over all quadrants. Liver and spleen not palpable, no masses palpable. Bowel sounds positive.  MS: No bony deformities noted.  No red or inflamed joints.    SKIN: Warm and dry, no rashes where skin visible.    NEURO: Mostly alert, but dozed off a couple of times, conversant, oriented and appropriate in conversation per , though some very mild dysarthria present. Mild left facial droop noted, cranial nerves II - XII otherwise grossly intact.  Dense left upper and lower extremity paresis persists.  PSYCH: Calm, conversant. Language barrier prevents good exam, though affect appears normal.      ASSESSMENT/PLAN:     (N18.3) Chronic kidney disease  Comment: GFR has demonstrated gradual decline over past 6 months, from around 52 to 33 currently.  Unclear why this is happening: worsened heart function vs ARB therapy with losartan vs embolic disease (he has previous embolism from LVAD to brain) vs other.  Is on no nephrotoxins.  He has no significant metabolic abnormalities due to renal failure, and I doubt his anemia is due to renal disease at GFR 33.  Plan: Daughters did not schedule the nephrology referrals I made 7/2017 and 8/2017, but he is scheduled for a Nephrology visit 11/17/17, of which I reminded patient's daughter today. Cardiology advises continuation of losartan 12.5 mg QD as Rx for end-stage CHF, though will await renal opinion on this.      (R33.8) Urinary retention due to (N40.1) Benign non-nodular prostatic hyperplasia with lower urinary tract symptoms  Comment: Patient was unable to void in-hospital 4/2017, so Mcarthur was placed, then subsequently removed 5/2017 at  outpatient Urology visit.  Urology has started finasteride and tamsulosin, both of which continue.  Plan: Missed Urology follow-up scheduled 6/2017 due to hospitalization; daughter has not rescheduled.  Finasteride and tamsulosin continue.      (D50.0) Chronic anemia due to iron dieficiency   Comment: Baseline Hgb has been in the 11's.  GI blood loss during 5/2017 hospital stay resulted in doris Hgb of 8.4 and discharge Hgb of 8.7.  He is having no melena or other blood loss.  He remains on warfarin, target INR 1.8 to 2.3 per Cardiology (lower INR selected due to gross hematuria).  Serum Fe was 44 on 6/22/17 labs, which were done one month after oral FeSO4 were stopped.  Hgb remains at recent baseline of 9.8.  Plan: Follow Hgb, recheck iron in a few months.  May need to restart FeSO4 if Hgb declines and/or if serum Fe drops.      (I50.22) CHF (congestive heart failure), NYHA class IV, (Z95.811) LVAD (left ventricular assist device) present  Comment: Patient has recurrent embolic events from his LVAD, including recurrent stroke, despite careful anticoagulation. His LVAD cannot be exchanged, per my conversation with Dr. Lemons, Cardiology. Heart failure overall appears to be well-compensated today. VAD interrogated during most recent cardiology visit and hospital stay, no abnormalities found.  See note from [facility] follow-up 9/6/17 above; no medication changes were made.  Plan: Continue current care, though focusing on palliative care. He continues on warfarin as directed by V[facility] Clinic. Continue to defer management of rhythm, hemodynamics, anticoagulation to the Cardiology/LVAD Clinic.  Previous INR target was 2.5 to 3.0 per Cardiology, but a lower target INR of 1.8 to 2.3 is currently being used due to recurrent gross hematuria.  ASA reamains on hold at Cardiology advice due to hematuria.  These INR and ASA modifications are to continue until further notice, per message from Cardiology.  Next Cardiology follow-up  approx 12/2017.        (R56.9) Focal onset epilepsy  Comment: Seizure at home 6/2015 prompted hospitalization, patient now on levetiracetam. No additional seizure activity noted since Rx started.   Plan: Continue levetiracetam 500 mg Q12H, observe for seizure recurrence.        (I63.411) Cerebrovascular accident 9/2014 due to embolism of R MCA  Comment: Residual deficits include dysarthria, which has improved remarkably, dysphagia (see below), dense left hemiparesis, and new-onset epilepsy as of 6/2015. Given that source of embolic strokes is from LVAD, and that embolism cannot be prevented despite anticoagulation, future events are possible or even likely. He had what appears to have been a TIA during 4/2016 hospital stay when warfarin and ASA were held due to gross hematuria, but subsequent neuro workup did not reveal new stroke.  Neuro status has been stable since that time.  Plan: Warfarin anticoagulation continues per Cardiology, ASA held due to hematuria, details above.  Observe for new events.      (R13.10) Dysphagia  Comment: Had bedside swallow evaluation from Speech Therapy 12/2014. He had timbo aspiration only to thin liquids. Per daughters, although they have to feed him due to weakness from stroke, he appears to swallow without choking or aspiration episodes.  The only dysphagia they notice is to large meds like levetiracetam.  Plan: Continue speech therapy recommendations: patient should be sitting straight up when eating, take small bolus sizes, eat slowly, not talk during eating. Continue PO diet.      (E78.5) Hyperlipidemia  Comment: Remains on atorvastatin, lipids well-controlled on 6/22/17 labs.  Plan:  Continue atorvastatin.    (M79.672) Left foot pain  Comment: Has been reported intermittently by patient, but daughter called 9/25/17 to report that the pain was more severe and that analgesia was needed.  I presume this is of neuropathic origin, as the pain is primarily on the stroke-affected  side; exam of the foot is unremarkable.  We started gabapentin 100 mg BID on 9/26/17, which daughter gave him for several days and which was effective, but then daughter says she was advised to stop Rx, though she doesn't recall by whom or for what reason.  Plan: I advised daughter to resume gabapentin 100 mg PO BID; this dose is OK at patient's current GFR.  Will monitor response.  I advised daughter that Rx may add to patient's sleepiness.    FUTURE LABS:       - Schedule a non-fasting blood draw within the next few days to recheck GFR.    FUTURE APPOINTMENTS:       - Follow-up visit scheduled for 11/14/17 at 1400.    Face to face time was 45 minutes, at least 50% of which was spent in counseling regarding management of renal disease, foot pain, heart failure, and multiple complications of past stroke.      Thomas Dill MD  [facility]

## 2017-10-11 NOTE — PATIENT INSTRUCTIONS
1.  Please resume gabapentin 100 mg tablets, one tablet by mouth twice daily, in hopes that this will control your foot pain.  Please be advised that sleepiness is a side effect of this medication, though I don't think it will be severe at this low dose.    2.  We made no other medication changes today.    3.  I ordered some labs, which hopefully we can have drawn later this week.    4.  I'm scheduled to come back on Tuesday, November 14th at 2 PM.

## 2017-10-12 NOTE — MR AVS SNAPSHOT
Sohan [facility]   10/12/2017   Anticoagulation Therapy Visit    Description:  67 year old male   Provider:  Blanca Bah RN   Department:  [facility]           INR as of 10/12/2017     Today's INR 1.7!      Anticoagulation Summary as of 10/12/2017     INR goal    Prior goal 1.8-2.5   Today's INR 1.7!   Full instructions 10/12: 4.5 mg; 10/13: 4.5 mg; 10/14: 4.5 mg; 10/15: 4.5 mg   Next INR check 10/16/2017    Indications   LVAD (left ventricular assist device) present [Z95.811]  Long-term (current) use of anticoagulants [Z79.01] [Z79.01]         October 2017 Details    Sun Mon Tue Wed Thu Fri Sat     1               2               3               4               5               6               7                 8               9               10               11               12      4.5 mg   See details      13      4.5 mg         14      4.5 mg           15      4.5 mg         16            17               18               19               20               21                 22               23               24               25               26               27               28                 29               30               31                    Date Details   10/12 This INR check       Date of next INR:  10/16/2017         How to take your warfarin dose     To take:  4.5 mg Take 1.5 of the 3 mg tablets.

## 2017-10-12 NOTE — PROGRESS NOTES
ANTICOAGULATION FOLLOW-UP CLINIC VISIT    Patient Name:  Sohan Rendon  Date:  10/12/2017  Contact Type:  Telephone    SUBJECTIVE:        OBJECTIVE    INR   Date Value Ref Range Status   10/12/2017 1.7  Final     Chromogenic Factor 10   Date Value Ref Range Status   08/10/2014 24 (L) 70 - 130 % Final     Comment:     Therapeutic Range:  A Chromogenic Factor 10 level of approximately 20-40%   inversely correlates with an INR of 2-3 for patients receiving Warfarin.   Chromogenic Factor 10 levels below 20% indicate an INR greater than 3 and   levels above 40% indicate an INR less than 2.       Factor 2 Assay   Date Value Ref Range Status   07/21/2014 25 (L) 60 - 140 % Final     Comment:     Analyte Specific Reagents (ASRs) are used in many laboratory tests necessary   for   standard medical care and generally do not require FDA approval.  This test   was   developed and its performance characteristics determined by [facility].  It has not been cleared or approved by   the [place] Food and Drug Administration.         ASSESSMENT / PLAN  INR assessment THER    Recheck INR In: 4 DAYS    INR Location Home INR      Anticoagulation Summary as of 10/12/2017     INR goal    Prior goal 1.8-2.5   Today's INR 1.7!   Maintenance plan No maintenance plan   Full instructions 10/12: 4.5 mg; 10/13: 4.5 mg; 10/14: 4.5 mg; 10/15: 4.5 mg   Plan last modified Blanca Bah RN (10/9/2017)   Next INR check 10/16/2017   Priority INR   Target end date Indefinite    Indications   LVAD (left ventricular assist device) present [Z95.811]  Long-term (current) use of anticoagulants [Z79.01] [Z79.01]         Anticoagulation Episode Summary     INR check location     Preferred lab     Send INR reminders to [facility]    Comments Goal Range is 1.8-2.3  [facility] comes out to draw INR  Sofya Ph [phone]  Daughter Almaz Ph [phone]      Anticoagulation Care Providers      Provider Role Specialty Phone number    Evon Lemons MD Responsible Cardiology [phone]            See the Encounter Report to view Anticoagulation Flowsheet and Dosing Calendar (Go to Encounters tab in chart review, and find the Anticoagulation Therapy Visit)    Spoke with Isiah Bah RN

## 2017-10-13 NOTE — TELEPHONE ENCOUNTER
Pantoprazole 40 mg      Last Written Prescription Date:  10/4/16  Last Fill Quantity: 180,   # refills: 3  Last Office Visit with [facility] prescribing provider: 10/10/17  Future Office visit:    Next 5 appointments (look out 90 days)     Nov 14, 2017  2:00 PM CST   [facility] Dill [facility]   [facility]    [address]  [address]  [facility] [address]   [phone]            Dec 12, 2017  2:00 PM CST   Return Visit with Thomas Dill MD   [facility]    [address]  [address]  [facility] [address]   [phone]                   Routing refill request to provider for review/approval because:  Drug not on the [facility] refill protocol or controlled substance    Routing to provider to complete.    Concetta Burr RN

## 2017-10-16 NOTE — PROGRESS NOTES
Received a call from Sofya MCKINNON.  They will check INR tomorrow, 10/17/17 when they do his home care resert.  He will take warfarin 4.5 mg tonight.  Calendar updated.  Sarah Osborne RN

## 2017-10-16 NOTE — MR AVS SNAPSHOT
Sohan [facility]   10/16/2017   Anticoagulation Therapy Visit    Description:  67 year old male   Provider:  Sarah Osborne, RN   Department:  [facility]           INR as of 10/16/2017     Today's INR No new INR was available at the time of this encounter.      Anticoagulation Summary as of 10/16/2017     INR goal    Prior goal 1.8-2.5   Today's INR No new INR was available at the time of this encounter.   Full instructions 10/16: 4.5 mg   Next INR check 10/17/2017    Indications   LVAD (left ventricular assist device) present [Z95.811]  Long-term (current) use of anticoagulants [Z79.01] [Z79.01]         October 2017 Details    Sun Mon Tue Wed Thu Fri Sat     1               2               3               4               5               6               7                 8               9               10               11               12               13               14                 15               16      4.5 mg   See details      17            18               19               20               21                 22               23               24               25               26               27               28                 29               30               31                    Date Details   10/16 This INR check       Date of next INR:  10/17/2017         How to take your warfarin dose     To take:  4.5 mg Take 1.5 of the 3 mg tablets.

## 2017-10-17 NOTE — MR AVS SNAPSHOT
Sohan [facility]   10/17/2017   Anticoagulation Therapy Visit    Description:  67 year old male   Provider:  Sarah Martinez, RN   Department:  [facility]           INR as of 10/17/2017     Today's INR 2.30      Anticoagulation Summary as of 10/17/2017     INR goal    Prior goal 1.8-2.5   Today's INR 2.30   Full instructions 10/17: 3 mg; 10/18: 4.5 mg; 10/19: 4.5 mg   Next INR check 10/20/2017    Indications   LVAD (left ventricular assist device) present [Z95.811]  Long-term (current) use of anticoagulants [Z79.01] [Z79.01]         October 2017 Details    Sun Mon Tue Wed Thu Fri Sat     1               2               3               4               5               6               7                 8               9               10               11               12               13               14                 15               16               17      3 mg   See details      18      4.5 mg         19      4.5 mg         20            21                 22               23               24               25               26               27               28                 29               30               31                    Date Details   10/17 This INR check       Date of next INR:  10/20/2017         How to take your warfarin dose     To take:  3 mg Take 1 of the 3 mg tablets.    To take:  4.5 mg Take 1.5 of the 3 mg tablets.

## 2017-10-17 NOTE — PROGRESS NOTES
[facility] now requests orders and shares plan of care/discharge summaries for some patients through [employer].  Please REPLY TO THIS MESSAGE in order to give authorization for orders when needed.  This is considered a verbal order, you will still receive a faxed copy of orders for signature.  Thank you for your assistance in improving collaboration for our patients.    ORDER  Skilled Nursing visits 1x/week for 9 weeks and 3PRN    PLAN OF CARE  DIAGNOSIS INFORMATION   Heart Failure     Date last updated 10/17/2017    up to six conditions ACTIVELY treated during inpatient stay/NA   up to six conditions requiring changed treatment regimen/N/A  One Primary homecare diagnosis for most disciplines/ Heart failure   Five secondary diagnoses/Anemia, Ischemic cardiomyopathy, Stroke with left sided weakness, CHF, Gout, Esophagitis, Dysphagia, Automatic implantable cardiac defibrillator in situ, Hx of LVAD complication, subsequent encounter, Hx pathological fractuer of rt femur, hx of UTIs  Additional current or history diagnoses that impact the plan of care/ Hematuria, Urinary outflow obstruction, subtheraputic INR, TIA  Rationale for OASIS questions not assessed/no info/Dash Pt unable to stand for weight and height checks    APPROPRIATE FOR LPN DELEGATION Yes   FACE TO FACE  FUTURE APPOINTMENTS  Follow up visit with Dr. Dill, not sure when but it is sometime this month    SKILLED CARE TO BE PERFORMED AT NEXT VISIT    INR goal 1.8 to 2.3  Call to [facility] at .     1. Next INR due per orders, 10/20/17  2. assess for abd pain, possible pancreatitis   3. Assess LVAD, site cares completed by daughter, states she does every other day  5. Assess LVAD site for signs and symptoms of infection      RCT   SITUATION/BACKGROUND   Mr. Rojo is seen today for recertification of homecare services. In the last sixty days he was admitted to [facility] at the end of August for LVAD changes. He was given IV  hydration and heparin. His labs stabilized and he returned to home. He is alert and readily responds with interpretation from family, pt declines outside . Pt continues on an LVAD. The site is free from complications and it is functioning properly. His BP, HR and O2 sat were difficult to obtain r/t the LVAD. LS are clear, BS active in all 4 quadrants, denies GI/ sx at this time. He denies pain today. 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 He continues to require a Jay Lift for transfers and wheelchair for mobility. Family continues to be attentive they provide 24 hr care to their father.   R 16, Temp 95.5   ANALYSIS/RECOMMENDATION   Pt. is at high risk for complication due to medical complexity. SN to continue to see pt. weekly and as needed to assess cardio/resp status, GI//pain/meds/INR draws/skin assessment/nutrition hydration.  Estimated length of Home Care needs are ongoing r/t pt comorbidities and homebound status.

## 2017-10-17 NOTE — PROGRESS NOTES
ANTICOAGULATION FOLLOW-UP CLINIC VISIT    Patient Name:  Sohan Rendon  Date:  10/17/2017  Contact Type:  Telephone    SUBJECTIVE:     Patient Findings     Positives Change in medications    Comments Note in EPIC 10/10/17 Dr. Thomas Dill Resume Gabapentin 1 tablet BID            OBJECTIVE    INR   Date Value Ref Range Status   10/17/2017 2.30  Final     Comment:     Home Care      Chromogenic Factor 10   Date Value Ref Range Status   08/10/2014 24 (L) 70 - 130 % Final     Comment:     Therapeutic Range:  A Chromogenic Factor 10 level of approximately 20-40%   inversely correlates with an INR of 2-3 for patients receiving Warfarin.   Chromogenic Factor 10 levels below 20% indicate an INR greater than 3 and   levels above 40% indicate an INR less than 2.       Factor 2 Assay   Date Value Ref Range Status   07/21/2014 25 (L) 60 - 140 % Final     Comment:     Analyte Specific Reagents (ASRs) are used in many laboratory tests necessary   for   standard medical care and generally do not require FDA approval.  This test   was   developed and its performance characteristics determined by [facility].  It has not been cleared or approved by   the US Food and Drug Administration.         ASSESSMENT / PLAN  INR assessment THER    Recheck INR In: 3 DAYS    INR Location Homecare INR      Anticoagulation Summary as of 10/17/2017     INR goal    Prior goal 1.8-2.5   Today's INR 2.30   Maintenance plan No maintenance plan   Full instructions 10/17: 3 mg; 10/18: 4.5 mg; 10/19: 4.5 mg   Plan last modified Blanca Bah RN (10/9/2017)   Next INR check 10/20/2017   Priority INR   Target end date Indefinite    Indications   LVAD (left ventricular assist device) present [Z95.811]  Long-term (current) use of anticoagulants [Z79.01] [Z79.01]         Anticoagulation Episode Summary     INR check location     Preferred lab     Send INR reminders to [facility]    Comments Goal  Range is 1.8-2.3  [facility] comes out to draw INR  Sofya Ph [phone]  Daughter Almaz Ph [phone]      Anticoagulation Care Providers     Provider Role Specialty Phone number    Evon Lemons MD Responsible Cardiology [phone]            See the Encounter Report to view Anticoagulation Flowsheet and Dosing Calendar (Go to Encounters tab in chart review, and find the Anticoagulation Therapy Visit)    Spoke with INO Haines. Gave them new warfarin recommendation.  No changes in health, medication, or diet. No missed doses, no falls. No signs or symptoms of bleed or clotting.     Sarah Martinez RN

## 2017-10-17 NOTE — TELEPHONE ENCOUNTER
Spoke with SHAWN Haines [facility] and gave verbal ok on orders requested per RN protocol.    Concetta Burr RN

## 2017-10-17 NOTE — TELEPHONE ENCOUNTER
Yancy called from [facility] requesting orders. Orders for recert of skilled nursing 1w9, 3 PRN.     Yancy can be reached with verbal.

## 2017-10-17 NOTE — TELEPHONE ENCOUNTER
Called daughter, Elana with the dose change and also Sofya ESCOBAR with [facility].     Kelin Jon RN  [facility]

## 2017-10-20 NOTE — PROGRESS NOTES
ANTICOAGULATION FOLLOW-UP CLINIC VISIT    Patient Name:  Sohan Rendon  Date:  10/20/2017  Contact Type:  Telephone    SUBJECTIVE:        OBJECTIVE    INR   Date Value Ref Range Status   10/20/2017 2.2  Final     Chromogenic Factor 10   Date Value Ref Range Status   08/10/2014 24 (L) 70 - 130 % Final     Comment:     Therapeutic Range:  A Chromogenic Factor 10 level of approximately 20-40%   inversely correlates with an INR of 2-3 for patients receiving Warfarin.   Chromogenic Factor 10 levels below 20% indicate an INR greater than 3 and   levels above 40% indicate an INR less than 2.       Factor 2 Assay   Date Value Ref Range Status   07/21/2014 25 (L) 60 - 140 % Final     Comment:     Analyte Specific Reagents (ASRs) are used in many laboratory tests necessary   for   standard medical care and generally do not require FDA approval.  This test   was   developed and its performance characteristics determined by [facility].  It has not been cleared or approved by   the US Food and Drug Administration.         ASSESSMENT / PLAN  INR assessment THER    Recheck INR In: 3 DAYS    INR Location Homecare INR      Anticoagulation Summary as of 10/20/2017     INR goal    Prior goal 1.8-2.5   Today's INR 2.2   Maintenance plan No maintenance plan   Full instructions 10/20: 4.5 mg; 10/21: 4.5 mg; 10/22: 4.5 mg   Plan last modified Blanca Bah RN (10/9/2017)   Next INR check 10/23/2017   Priority INR   Target end date Indefinite    Indications   LVAD (left ventricular assist device) present [Z95.811]  Long-term (current) use of anticoagulants [Z79.01] [Z79.01]         Anticoagulation Episode Summary     INR check location     Preferred lab     Send IN[facility] reminders to [facility]    Comments Goal Range is 1.8-2.3  [facility] comes out to draw INR  Sofya Ph [phone]  Daughter Almaz Ph [phone]      Anticoagulation Care Providers     Provider  Role Specialty Phone number    Evon Lemons MD Responsible Cardiology [phone]            See the Encounter Report to view Anticoagulation Flowsheet and Dosing Calendar (Go to Encounters tab in chart review, and find the Anticoagulation Therapy Visit)    Spoke with JONNIE Cowart. Gave them their lab results and new warfarin recommendation.  No changes in health, medication, or diet. No missed doses, no falls. No signs or symptoms of bleed or clotting.    James Nickerson, RN

## 2017-10-20 NOTE — MR AVS SNAPSHOT
Sohan [facility]   10/20/2017   Anticoagulation Therapy Visit    Description:  67 year old male   Provider:  James Nickerson RN   [facility]           INR as of 10/20/2017     Today's INR 2.2      Anticoagulation Summary as of 10/20/2017     INR goal    Prior goal 1.8-2.5   Today's INR 2.2   Full instructions 10/20: 4.5 mg; 10/21: 4.5 mg; 10/22: 4.5 mg   Next INR check 10/23/2017    Indications   LVAD (left ventricular assist device) present [Z95.811]  Long-term (current) use of anticoagulants [Z79.01] [Z79.01]         October 2017 Details    Sun Mon Tue Wed Thu Fri Sat     1               2               3               4               5               6               7                 8               9               10               11               12               13               14                 15               16               17               18               19               20      4.5 mg   See details      21      4.5 mg           22      4.5 mg         23            24               25               26               27               28                 29               30               31                    Date Details   10/20 This INR check       Date of next INR:  10/23/2017         How to take your warfarin dose     To take:  4.5 mg Take 1 of the 3 mg tablets and 1.5 of the 1 mg tablets.

## 2017-10-23 NOTE — MR AVS SNAPSHOT
Sohan [facility]   10/23/2017   Anticoagulation Therapy Visit    Description:  67 year old male   Provider:  Blanca Bah, RN   Department:  [facility]           INR as of 10/23/2017     Today's INR 2.4      Anticoagulation Summary as of 10/23/2017     INR goal    Prior goal 1.8-2.5   Today's INR 2.4   Full instructions 3 mg on Mon; 4.5 mg all other days   Next INR check 10/30/2017    Indications   LVAD (left ventricular assist device) present [Z95.811]  Long-term (current) use of anticoagulants [Z79.01] [Z79.01]         October 2017 Details    Sun Mon Tue Wed Thu Fri Sat     1               2               3               4               5               6               7                 8               9               10               11               12               13               14                 15               16               17               18               19               20               21                 22               23      3 mg   See details      24      4.5 mg         25      4.5 mg         26      4.5 mg         27      4.5 mg         28      4.5 mg           29      4.5 mg         30            31                    Date Details   10/23 This INR check       Date of next INR:  10/30/2017         How to take your warfarin dose     To take:  3 mg Take 1 of the 3 mg tablets.    To take:  4.5 mg Take 1.5 of the 3 mg tablets.

## 2017-10-23 NOTE — PROGRESS NOTES
[phone][facility] to follow up on progress of getting a CADI CM from [facility]. She stated she had put in an authorization to the state for extended PCA but for some reason it did not go through. She needs to redo the authorization and then she can transfer client to a CADI CM. She will notify this CM when there is a CADI CM assigned.   Nena Salazar RN  [facility]   [phone]

## 2017-10-23 NOTE — PROGRESS NOTES
ANTICOAGULATION FOLLOW-UP CLINIC VISIT    Patient Name:  Sohan Rendon  Date:  10/23/2017  Contact Type:  Telephone    SUBJECTIVE:     Patient Findings     Positives No Problem Findings           OBJECTIVE    INR   Date Value Ref Range Status   10/23/2017 2.4  Corrected     Chromogenic Factor 10   Date Value Ref Range Status   08/10/2014 24 (L) 70 - 130 % Final     Comment:     Therapeutic Range:  A Chromogenic Factor 10 level of approximately 20-40%   inversely correlates with an INR of 2-3 for patients receiving Warfarin.   Chromogenic Factor 10 levels below 20% indicate an INR greater than 3 and   levels above 40% indicate an INR less than 2.       Factor 2 Assay   Date Value Ref Range Status   07/21/2014 25 (L) 60 - 140 % Final     Comment:     Analyte Specific Reagents (ASRs) are used in many laboratory tests necessary   for   standard medical care and generally do not require FDA approval.  This test   was   developed and its performance characteristics determined by [facility].  It has not been cleared or approved by   the US Food and Drug Administration.         ASSESSMENT / PLAN  INR assessment THER    Recheck INR In: 1 WEEK    INR Location Homecare INR      Anticoagulation Summary as of 10/23/2017     INR goal    Prior goal 1.8-2.5   Today's INR 2.4   Maintenance plan 3 mg (3 mg x 1) on Mon; 4.5 mg (3 mg x 1.5) all other days   Full instructions 3 mg on Mon; 4.5 mg all other days   Weekly total 30 mg   Plan last modified Blanca Bah RN (10/23/2017)   Next INR check 10/30/2017   Priority INR   Target end date Indefinite    Indications   LVAD (left ventricular assist device) present [Z95.811]  Long-term (current) use of anticoagulants [Z79.01] [Z79.01]         Anticoagulation Episode Summary     INR check location     Preferred lab     Send INR reminders to [facility]    Comments Goal Range is 1.8-2.3  [facility] comes out to draw INR Case  Manager Sofya  [phone]  Daughter Almaz  [phone]      Anticoagulation Care Providers     Provider Role Specialty Phone number    Evon Lemons MD Responsible Cardiology [phone]            See the Encounter Report to view Anticoagulation Flowsheet and Dosing Calendar (Go to Encounters tab in chart review, and find the Anticoagulation Therapy Visit)    Spoke with Sofya MCKINNON.    Blanca Bah RN

## 2017-10-25 NOTE — TELEPHONE ENCOUNTER
Request for Magnesium Oxide 400 mg  Last refill on 5/13/17  #14-0 refills       Routing refill request to provider for review/approval because:  Drug not on the [facility] refill protocol     Routing to provider to complete.    Concetta Burr RN

## 2017-10-25 NOTE — TELEPHONE ENCOUNTER
Received request for Mag Ox refill from [facility] pharmacy.    Last dispense per EPIC was 5/13/2017 with quantity of #14 tablets.  Appears that it was prescribed at hospital discharge.    Refill?

## 2017-10-30 NOTE — MR AVS SNAPSHOT
Sohan Rendon   10/30/2017   Anticoagulation Therapy Visit    Description:  67 year old male   Provider:  Sarah Osborne, RN   Department:  [facility]           INR as of 10/30/2017     Today's INR 2.2      Anticoagulation Summary as of 10/30/2017     INR goal    Prior goal 1.8-2.5   Today's INR 2.2   Full instructions 3 mg on Mon; 4.5 mg all other days   Next INR check 11/6/2017    Indications   LVAD (left ventricular assist device) present [Z95.811]  Long-term (current) use of anticoagulants [Z79.01] [Z79.01]         October 2017 Details    Sun Mon Tue Wed Thu Fri Sat     1               2               3               4               5               6               7                 8               9               10               11               12               13               14                 15               16               17               18               19               20               21                 22               23               24               25               26               27               28                 29               30      3 mg   See details      31      4.5 mg              Date Details   10/30 This INR check               How to take your warfarin dose     To take:  3 mg Take 1 of the 3 mg tablets.    To take:  4.5 mg Take 1.5 of the 3 mg tablets.           November 2017 Details    Sun Mon Tue Wed Thu Fri Sat        1      4.5 mg         2      4.5 mg         3      4.5 mg         4      4.5 mg           5      4.5 mg         6            7               8               9               10               11                 12               13               14               15               16               17               18                 19               20               21               22               23               24               25                 26               27               28               29               30                  Date Details    No additional details    Date of next INR:  11/6/2017         How to take your warfarin dose     To take:  3 mg Take 1 of the 3 mg tablets.    To take:  4.5 mg Take 1.5 of the 3 mg tablets.

## 2017-10-30 NOTE — PROGRESS NOTES
ANTICOAGULATION FOLLOW-UP CLINIC VISIT    Patient Name:  Sohan Rendon  Date:  10/30/2017  Contact Type:  Telephone    SUBJECTIVE:     Patient Findings     Positives No Problem Findings           OBJECTIVE    INR   Date Value Ref Range Status   10/30/2017 2.2  Final     Chromogenic Factor 10   Date Value Ref Range Status   08/10/2014 24 (L) 70 - 130 % Final     Comment:     Therapeutic Range:  A Chromogenic Factor 10 level of approximately 20-40%   inversely correlates with an INR of 2-3 for patients receiving Warfarin.   Chromogenic Factor 10 levels below 20% indicate an INR greater than 3 and   levels above 40% indicate an INR less than 2.       Factor 2 Assay   Date Value Ref Range Status   07/21/2014 25 (L) 60 - 140 % Final     Comment:     Analyte Specific Reagents (ASRs) are used in many laboratory tests necessary   for   standard medical care and generally do not require FDA approval.  This test   was   developed and its performance characteristics determined by [facility].  It has not been cleared or approved by   the US Food and Drug Administration.         ASSESSMENT / PLAN  INR assessment THER    Recheck INR In: 1 WEEK    INR Location [facility] INR      Anticoagulation Summary as of 10/30/2017     INR goal    Prior goal 1.8-2.5   Today's INR 2.2   Maintenance plan 3 mg (3 mg x 1) on Mon; 4.5 mg (3 mg x 1.5) all other days   Full instructions 3 mg on Mon; 4.5 mg all other days   Weekly total 30 mg   No change documented Sarah Osborne RN   Plan last modified Blanca Bah RN (10/23/2017)   Next INR check 11/6/2017   Priority INR   Target end date Indefinite    Indications   LVAD (left ventricular assist device) present [Z95.811]  Long-term (current) use of anticoagulants [Z79.01] [Z79.01]         Anticoagulation Episode Summary     INR check location     Preferred lab     Send INR reminders to [facility]    Comments Goal Range is  1.8-2.3  [facility] comes out to draw INR  Sofya Ph [phone]  Daughter Almaz Ph [phone]      Anticoagulation Care Providers     Provider Role Specialty Phone number    Evon Lemons MD Responsible Cardiology [phone]            See the Encounter Report to view Anticoagulation Flowsheet and Dosing Calendar (Go to Encounters tab in chart review, and find the Anticoagulation Therapy Visit)    Spoke with Lyly MCKINNON.  She reports no changes in health, diet, medication.      Sarah Osborne RN

## 2017-11-06 NOTE — MR AVS SNAPSHOT
Sohan [facility]   11/6/2017   Anticoagulation Therapy Visit    Description:  67 year old male   Provider:  Blanca Bah, RN   Department:  [facility]           INR as of 11/6/2017     Today's INR 2.3      Anticoagulation Summary as of 11/6/2017     INR goal    Prior goal 1.8-2.5   Today's INR 2.3   Full instructions 3 mg on Mon; 4.5 mg all other days   Next INR check 11/13/2017    Indications   LVAD (left ventricular assist device) present [Z95.811]  Long-term (current) use of anticoagulants [Z79.01] [Z79.01]         November 2017 Details    Sun Mon Tue Wed Thu Fri Sat        1               2               3               4                 5               6      3 mg   See details      7      4.5 mg         8      4.5 mg         9      4.5 mg         10      4.5 mg         11      4.5 mg           12      4.5 mg         13            14               15               16               17               18                 19               20               21               22               23               24               25                 26               27               28               29               30                  Date Details   11/06 This INR check       Date of next INR:  11/13/2017         How to take your warfarin dose     To take:  3 mg Take 1 of the 3 mg tablets.    To take:  4.5 mg Take 1.5 of the 3 mg tablets.

## 2017-11-06 NOTE — PROGRESS NOTES
ANTICOAGULATION FOLLOW-UP CLINIC VISIT    Patient Name:  Sohan Rendon  Date:  11/6/2017  Contact Type:  Telephone    SUBJECTIVE:     Patient Findings     Positives No Problem Findings           OBJECTIVE    INR   Date Value Ref Range Status   11/06/2017 2.3  Final     Chromogenic Factor 10   Date Value Ref Range Status   08/10/2014 24 (L) 70 - 130 % Final     Comment:     Therapeutic Range:  A Chromogenic Factor 10 level of approximately 20-40%   inversely correlates with an INR of 2-3 for patients receiving Warfarin.   Chromogenic Factor 10 levels below 20% indicate an INR greater than 3 and   levels above 40% indicate an INR less than 2.       Factor 2 Assay   Date Value Ref Range Status   07/21/2014 25 (L) 60 - 140 % Final     Comment:     Analyte Specific Reagents (ASRs) are used in many laboratory tests necessary   for   standard medical care and generally do not require FDA approval.  This test   was   developed and its performance characteristics determined by [facility].  It has not been cleared or approved by   the US Food and Drug Administration.         ASSESSMENT / PLAN  INR assessment THER    Recheck INR In: 1 WEEK    INR Location [facility]care INR      Anticoagulation Summary as of 11/6/2017     INR goal    Prior goal 1.8-2.5   Today's INR 2.3   Maintenance plan 3 mg (3 mg x 1) on Mon; 4.5 mg (3 mg x 1.5) all other days   Full instructions 3 mg on Mon; 4.5 mg all other days   Weekly total 30 mg   No change documented Blanca Bah RN   Plan last modified Blanca Bah RN (10/23/2017)   Next INR check 11/13/2017   Priority INR   Target end date Indefinite    Indications   LVAD (left ventricular assist device) present [Z95.811]  Long-term (current) use of anticoagulants [Z79.01] [Z79.01]         Anticoagulation Episode Summary     INR check location     Preferred lab     Send INR reminders to [facility]    Comments Goal Range is 1.8-2.3  [facility] comes out to draw INR  Sofya Ph [phone]  Daughter Almaz Ph [phone]      Anticoagulation Care Providers     Provider Role Specialty Phone number    Evon Lemons MD Responsible Cardiology [phone]            See the Encounter Report to view Anticoagulation Flowsheet and Dosing Calendar (Go to Encounters tab in chart review, and find the Anticoagulation Therapy Visit)    Spoke with Sofya MCKINNON.    Blanca Bah RN

## 2017-11-13 NOTE — MR AVS SNAPSHOT
[facility]   11/13/2017   Anticoagulation Therapy Visit    Description:  67 year old male   Provider:  Sarah Martinez, RN   Department:  [facility]           INR as of 11/13/2017     Today's INR 1.80      Anticoagulation Summary as of 11/13/2017     INR goal    Prior goal 1.8-2.5   Today's INR 1.80   Full instructions 11/13: 4.5 mg; Otherwise 3 mg on Mon; 4.5 mg all other days   Next INR check 11/20/2017    Indications   LVAD (left ventricular assist device) present [Z95.811]  Long-term (current) use of anticoagulants [Z79.01] [Z79.01]         November 2017 Details    Sun Mon Tue Wed Thu Fri Sat        1               2               3               4                 5               6               7               8               9               10               11                 12               13      4.5 mg   See details      14      4.5 mg         15      4.5 mg         16      4.5 mg         17      4.5 mg         18      4.5 mg           19      4.5 mg         20            21               22               23               24               25                 26               27               28               29               30                  Date Details   11/13 This INR check       Date of next INR:  11/20/2017         How to take your warfarin dose     To take:  3 mg Take 1 of the 3 mg tablets.    To take:  4.5 mg Take 1.5 of the 3 mg tablets.

## 2017-11-13 NOTE — PROGRESS NOTES
ANTICOAGULATION FOLLOW-UP CLINIC VISIT    Patient Name:  Sohan Rendon  Date:  11/13/2017  Contact Type:  Telephone    SUBJECTIVE:     Patient Findings     Positives No Problem Findings           OBJECTIVE    INR   Date Value Ref Range Status   11/13/2017 1.80  Final     Comment:     Home [facility]      Chromogenic Factor 10   Date Value Ref Range Status   08/10/2014 24 (L) 70 - 130 % Final     Comment:     Therapeutic Range:  A Chromogenic Factor 10 level of approximately 20-40%   inversely correlates with an INR of 2-3 for patients receiving Warfarin.   Chromogenic Factor 10 levels below 20% indicate an INR greater than 3 and   levels above 40% indicate an INR less than 2.       Factor 2 Assay   Date Value Ref Range Status   07/21/2014 25 (L) 60 - 140 % Final     Comment:     Analyte Specific Reagents (ASRs) are used in many laboratory tests necessary   for   standard medical care and generally do not require FDA approval.  This test   was   developed and its performance characteristics determined by [facility].  It has not been cleared or approved by   the [place] Food and Drug Administration.         ASSESSMENT / PLAN  INR assessment THER    Recheck INR In: 1 WEEK    INR Location [facility] INR      Anticoagulation Summary as of 11/13/2017     INR goal    Prior goal 1.8-2.5   Today's INR 1.80   Maintenance plan 3 mg (3 mg x 1) on Mon; 4.5 mg (3 mg x 1.5) all other days   Full instructions 11/13: 4.5 mg; Otherwise 3 mg on Mon; 4.5 mg all other days   Weekly total 30 mg   Plan last modified Blanca Bah RN (10/23/2017)   Next INR check 11/20/2017   Priority INR   Target end date Indefinite    Indications   LVAD (left ventricular assist device) present [Z95.811]  Long-term (current) use of anticoagulants [Z79.01] [Z79.01]         Anticoagulation Episode Summary     INR check location     Preferred lab     Send INR reminders to [facility]    Comments Goal Range is  1.8-2.3  [facility] comes out to draw INR  Sofya Ph [phone]  Daughter Almaz Ph [phone]      Anticoagulation Care Providers     Provider Role Specialty Phone number    Evon Lemons MD Responsible Cardiology [phone]            See the Encounter Report to view Anticoagulation Flowsheet and Dosing Calendar (Go to Encounters tab in chart review, and find the Anticoagulation Therapy Visit)    Spoke with J[facility]art [facility]. Gave them new warfarin recommendation.  No changes in health, medication, or diet. No missed doses, no falls. No signs or symptoms of bleed or clotting.     Sarah Martinez RN

## 2017-11-14 NOTE — PROGRESS NOTES
SUBJECTIVE:     Sohan Rendon is a 67 year old male who has an LVAD due to severe ischemic cardiomyopathy. He has had recurrent embolic strokes, despite careful anticoagulation, with the LVAD believed to be the source. He was hospitalized 9/23 - 10/4/14 for acute ischemic subcortical stroke, superimposed upon previous R PICA and R MCA strokes. During that hospital stay, several discussions were carried out with the patient's family regarding the possibility of LVAD removal and conversion to Hospice. Per my discussion with his attending cardiologist, Dr. Lemons, family are well aware of the patient's terminal condition, but they steadfastly decline enrolling him into Hospice. They have wished to continue LVAD and other cares at home, while ensuring his comfort. Dr. Lemons made a referral for this patient to be seen at home by the [facility] to provide primary care management, though the LVAD/Cardiology clinics will remain in charge of his cardiology care.       Mr. Rendon was seen today in his home along with a daughter and an  for the above issues, as well as for the following health issues:         Heart Failure Follow-up    Patient with end-stage cardiomyopathy, Stage D, [facility] IIIB. LVAD in place. History of recurrent emboli from LVAD despite careful anticoagulation. Patient is not a candidate for device revision or exchange per Cardiology notes.    Symptoms: No recent recurrence of vague left anterior chest pain that can persist for days at a time, and which he and I have previously attributed to muscular strain.    Shortness of breath: Happens at rest infrequently, but not worse.    Lower extremity edema: No more than trace pedal edema at baseline, currently stable.    Chest pain: See above.    Using more pillows than normal: N/A; has adjustable (hospital) bed.    Cough at night: Yes- occasional cough, present for months, believed to be due to non-cardiac causes.    Weight: Not checking weight  "daily; patient unable to weight bear.    Medication side effects: None described to cardiac meds.    Cardiology visits, ER/UC, or hospital admissions since last visit: None.  Next Cardiology visit is 12/13/17.          Cerebrovascular Follow-up    History of multiple embolic strokes, LVAD source, despite warfarin anticoagulation. Most recent documented stroke September 2014.    Residual symptoms: Dysarthria, dysphagia, left hemiparesis. Dysarthria has improved remarkably. Dense left hemiparesis is unimproved. Dysphagia has improved to permit PO feeding of an unrestricted texture diet.  Diagnosed with new-onset seizure disorder 6/2015; see below.    Worsened or new symptoms since last visit: None.    Daily aspirin use: Stopped during 9/2016 hospital stay for gross hematuria and remains on hold.  Warfarin has continued, though inpatient Urology notes suggest a more conservative INR target of 1.8 to 2.3 instead of the prior Cardiology target of 2.5 to 3.0 (indication: history of recurrent stroke associated with LVAD).  Dr. Lemons, Cardiology, has advised that we should continue to hold ASA, and continue to aim for INR 1.8 to 2.3, until further notice.          Seizures    Duration: Initially diagnosed during hospitalization of 6/2015. Was noted to have seizure in-house, levetiracetam started.    Description (location/character/radiation): Described as \"focal onset epilepsy\" in Neuro consultation.    Intensity: No witnessed seizure activity since return home from 6/2015 admission.    History (similar episodes/previous evaluation): Neuro consultation during hospital stay 6/2015. No neuro follow-up scheduled.    Precipitating or alleviating factors: History of multiple embolic CVA due to LVAD thrombosis, most recently 9/2014.    Therapies tried and outcome: Levetiracetam 500 mg BID started 6/2015, no witnessed seizure activity since Rx started.                                                                             "         Amount of exercise or physical activity: None; essentially bed bound. Family says he can stand for brief periods of time if heavily supported, cannot walk. There is a Jay lift at home used for transfer to chair or wheelchair. Family has finally received a replacement manual wheelchair.     Problems taking medications regularly: Has some mild dysphagia, but is not missing doses. Meds administered by daughters.    Medication side effects: Lethargic occasionally; daughter and I wonder if this could represent medication side effect.    Diet: regular (no restrictions). Daughters feed him. No witnessed choking or aspiration.          Hyperlipidemia Follow-Up        Rate your low fat/cholesterol diet?: Not carefully monitoring fat, but meals prepared by daughters are generally low fat.    Taking statin?  Yes, atorvastatin.  Reports no myalgia.    Other lipid medications/supplements?:  None.       Chronic Kidney Disease         Current NSAID use?  None.    GFR reviewed over past several months: GFR was 50 to 53 in 2/2017, has demonstrated gradual decline since, most recently 33 on 9/6/17.    I made nephrology referrals at my last two visits in 7/2017 and 8/2017; daughter reports these did not get scheduled.  He is scheduled for Nephrology visit 11/17/17; I reminded daughter of this again today.      Problem list and histories reviewed & adjusted, as indicated.  Additional history: as documented    BP Readings from Last 3 Encounters:   11/14/17 96/66   10/11/17 92/64   09/12/17 92/60    Wt Readings from Last 3 Encounters:   09/06/17 170 lb (77.1 kg)   08/27/17 194 lb 0.1 oz (88 kg)   08/09/17 182 lb (82.6 kg)                  Labs reviewed in EPIC      Reviewed and updated as needed this visit by clinical staff       Reviewed and updated as needed this visit by Provider       ROS:  Obtained from patient's daughter, as well as the patient himself via .   CONSTITUTIONAL: NEGATIVE for chills, fever, sweats.    EYES: Reports ongoing mild itching and pain from OD, ongoing use of ketotifen has been helpful.  ENT/MOUTH: NEGATIVE for nasal congestion, sinus pressure. No recurrence of epistaxis.  RESP: NEGATIVE for dyspnea, wheeze, or respiratory chest pain. Cough has been mild/minimal since last visit, productive of scant clear sputum.  CV: NEGATIVE for orthopnea, or worsened lower extremity edema.   GI: NEGATIVE for recurrence of abdominal pain, no nausea or emesis.  Constipation is now generally well-controlled on Senna 1 or 2 per day; has had daily BM.  : Mcarthur was removed by Urology 5/2017.  NEGATIVE for dysuria, difficulties urinating, flank pain, or hematuria.    MUSCULOSKELETAL: NEGATIVE for change or worsening in right hip pain.       INTEG: No rashes reported.  NEURO: NEGATIVE for new or worsened focal numbness or weakness or syncope.  More prone to daytime sleepiness over past few months, daughter not concerned.     PSYCHIATRIC: NEGATIVE for changes in mild baseline anxiety, no panic, no recent change in mood.      OBJECTIVE:     BP 96/66  Pulse 60  Resp 14  SpO2 99% on room air.  There is no height or weight on file to calculate BMI.    GENERAL: Appears clean and comfortable, sitting in wheelchair in living room.  EYES: Eyes grossly normal to inspection, no conjunctivitis or discharge.  HENT: Nares patent, no discharge.    NECK: Trachea midline, no stridor.    RESP: No accessory muscle use. No cough during this visit.  Symmetrical breath sounds. Lungs clear throughout on inspiration and expiration. Expiration not prolonged, no wheeze.  CV: Regular rate and rhythm, non-tachycardic. Prominent LVAD noise, which sounds like someone is running a vacuum  in the near background, makes exam difficult. S1 S2 softly audible, no murmur or extra sound. No lower extremity edema. Extremities slightly cool x4.  ABDOMEN: LVAD entry site not undressed for exam. Protuberant, though soft, non-tender, no guarding over all  "quadrants. Liver and spleen not palpable, no masses palpable. Bowel sounds positive.  MS: No bony deformities noted.  No red or inflamed joints.    SKIN: Warm and dry, no rashes where skin visible.    NEURO: Alert, conversant, oriented and appropriate in conversation per , though some very mild dysarthria present. Mild left facial droop noted, cranial nerves II - XII otherwise grossly intact.  Dense left upper and lower extremity paresis persists.  PSYCH: Calm, conversant. Language barrier prevents good exam, though affect appears normal.    Diagnostic Test Results:  Results for orders placed or performed in visit on 11/13/17   INR   Result Value Ref Range    INR 1.80      *Note: Due to a large number of results and/or encounters for the requested time period, some results have not been displayed. A complete set of results can be found in Results Review.       ASSESSMENT/PLAN:     (N18.3) Chronic kidney disease  Comment: GFR has demonstrated gradual decline over past 6 months, from around 55 in 5/2017 to 38 most recently (using the \"If Black\" values).  Unclear why this is happening: worsened heart function vs ARB therapy with losartan vs embolic disease (he has previous embolism from LVAD to brain) vs other.  Is on no nephrotoxins.  He has no significant metabolic abnormalities due to renal failure, and I doubt his anemia is due to renal disease at GFR 38.  Plan: Daughters did not schedule the nephrology referrals I made 7/2017 and 8/2017, but he is scheduled for a Nephrology visit 11/17/17, of which I reminded patient's daughter today. Cardiology advises continuation of losartan 12.5 mg QD as Rx for end-stage CHF, and will await renal opinion on this.      (R33.8) Urinary retention due to (N40.1) Benign non-nodular prostatic hyperplasia with lower urinary tract symptoms  Comment: Patient was unable to void in-hospital 4/2017, so Mcarthur was placed, then subsequently removed 5/2017 at outpatient Urology " visit.  Urology has started finasteride and tamsulosin, both of which continue.  Plan: Missed Urology follow-up scheduled 6/2017 due to hospitalization; daughter has not rescheduled.  Finasteride and tamsulosin continue.      (D50.0) Chronic anemia due to iron dieficiency   Comment: Baseline Hgb has been in the 11's.  GI blood loss during 5/2017 hospital stay resulted in doris Hgb of 8.4 and discharge Hgb of 8.7.  He is having no melena or other blood loss.  He remains on warfarin, target INR 1.8 to 2.3 per Cardiology (lower INR selected due to gross hematuria).  Serum Fe was 44 on 6/22/17 labs, which were done one month after oral FeSO4 were stopped.  Hgb was 10.5 most recently (10/12/17) even off of FeSO4.  Plan: Follow Hgb, recheck iron in a few months.  May need to restart FeSO4 if Hgb declines and/or if serum Fe drops.      (I50.22) CHF (congestive heart failure), NYHA class IV, (Z95.811) LVAD (left ventricular assist device) present  Comment: Patient has recurrent embolic events from his LVAD, including recurrent stroke, despite careful anticoagulation. His LVAD cannot be exchanged, per my conversation with Dr. Lemons, Cardiology. Heart failure overall appears to be well-compensated today. VAD interrogated during most recent cardiology visit and hospital stay, no abnormalities found.  See note from [facility] follow-up 9/6/17 above; no medication changes were made.  Plan: Continue current care, though focusing on palliative care. He continues on warfarin as directed by [facility] Clinic. Continue to defer management of rhythm, hemodynamics, anticoagulation to the Cardiology/LVAD Clinic.  Previous INR target was 2.5 to 3.0 per Cardiology, but a lower target INR of 1.8 to 2.3 is currently being used due to recurrent gross hematuria.  ASA reamains on hold at Cardiology advice due to hematuria.  These INR and ASA modifications are to continue until further notice, per message from Cardiology.  Next Cardiology follow-up  12/13/17.        (R56.9) Focal onset epilepsy  Comment: Seizure at home 6/2015 prompted hospitalization, patient now on levetiracetam. No additional seizure activity noted since Rx started.   Plan: Continue levetiracetam 500 mg Q12H, which should be OK even with some decline in renal function, observe for seizure recurrence.        (I63.411) Cerebrovascular accident 9/2014 due to embolism of R MCA  Comment: Residual deficits include dysarthria, which has improved remarkably, dysphagia (see below), dense left hemiparesis, and new-onset epilepsy as of 6/2015. Given that source of embolic strokes is from LVAD, and that embolism cannot be prevented despite anticoagulation, future events are possible or even likely. He had what appears to have been a TIA during 4/2016 hospital stay when warfarin and ASA were held due to gross hematuria, but subsequent neuro workup did not reveal new stroke.  Neuro status has been stable since that time.  Plan: Warfarin anticoagulation continues per Cardiology, ASA held due to hematuria, details above.  Observe for new events.      (R13.10) Dysphagia  Comment: Had bedside swallow evaluation from Speech Therapy 12/2014. He had timbo aspiration only to thin liquids. Per daughters, although they have to feed him due to weakness from stroke, he appears to swallow without choking or aspiration episodes.  The only dysphagia they notice is to large meds like levetiracetam.  Plan: Continue speech therapy recommendations: patient should be sitting straight up when eating, take small bolus sizes, eat slowly, not talk during eating. Continue PO diet.      (E78.5) Hyperlipidemia  Comment: Remains on atorvastatin, lipids well-controlled on 6/22/17 labs.  Plan:  Continue atorvastatin.     (M79.672) Left foot pain  Comment: I presume this is of neuropathic origin, as the pain is primarily on the stroke-affected side; exam of the foot is unremarkable.  Pain has improved with resumption of gabapentin 100  mg BID.  Plan: Continue gabapentin 100 mg PO BID; this dose is OK at patient's current GFR.  Will monitor response.  I advised daughter that Rx may add to patient's sleepiness.      FUTURE APPOINTMENTS:       - I had to discuss with patient and his family today (via ) that [facility] is eliminating [facility], and that I will no longer be able to see him in home visits.  We discussed his options for primary care going forward.  He does not identify anyone as his PCP prior to my meeting him in 2014; most of his care was being managed by the [facility] Clinic or other subspecialists.  When he leaves home for clinic visits, he requires stretcher transport, as he is unable to sit in wheelchair for any more than one hour, usually less.  Daughters would like to be able to combine PCP visits with his Cardiology visits, which take place on the [facility].  Will ask Care Coordination help with this.    Face to face time was 38 minutes, at least 50% of which was spent in counseling regarding management of previous embolic stroke with residual deficits, renal disease, and issues relating to transition of his primary care.      Thomas Dill MD  [facility]

## 2017-11-14 NOTE — PATIENT INSTRUCTIONS
1.  We made no medication changes today.  We'll go through your medication list and renew any medication we can for the next year, so you don't have to keon down refills anytime soon.    2.  I'm going to ask the [facility] Coordination team to contact you, to help find a primary care clinic you can go to since I'm not going to be permitted to see you at home anymore.  We'll try to combine those visits with your [facility] Clinic visits so you don't need to arrange for stretcher transportation any more often than is necessary.    3.  It has been a real pleasure helping to care for you over the past 3 1/2 years.  I hope our paths cross again in the future.  Take care.

## 2017-11-14 NOTE — MR AVS SNAPSHOT
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  We made no medication changes today.  We'll go through your medication list and renew any medication we can for the next year, so you don't have to keon down refills anytime soon.    2.  I'm going to ask the [facility] team to contact you, to help find a primary care clinic you can go to since I'm not going to be permitted to see you at home anymore.  We'll try to combine those visits with your [facility] visits so you don't need to arrange for stretcher transportation any more often than is necessary.    3.  It has been a real pleasure helping to care for you over the past 3 1/2 years.  I hope our paths cross again in the future.  Take care.          Follow-ups after your visit        Your next 10 appointments already scheduled     Nov 17, 2017  7:30 AM CST   Lab with  LAB   [facility] ([facility])    [address] [place] [id number]   [phone]            Nov 17, 2017  8:00 AM CST   New Patient Visit with Jaleel Guy MD   [facility] ([facility])    [address] [place] [address]  [place] [address]   [phone]            Dec 13, 2017 10:00 AM CST   (Arrive by 9:45 AM)   Implanted Defibulator with Uc Cv Device 1   [facility])    [address] [place] [id number]   [phone]            Dec 13, 2017 10:40 AM CST   (Arrive by 10:25 AM)   Ventricular Assist Device with Evon Lemons MD   [facility])    [address] [place] [id number]   [phone]            Mar 09, 2018 10:30 AM CST   (Arrive by 10:15 AM)   Implanted Defibulator with Uc Cv Device 1   [facility])    [address] [place] [address]   [phone]            Mar 09, 2018 11:00 AM CST   (Arrive by 10:45 AM)   Ventricular Assist Device with CLAIRE [facility])    [address] [place] [address]   [phone]              Who to contact     If you have questions or need follow up information about today's clinic visit or your schedule please contact [facility] directly at [phone].  Normal or non-critical lab and imaging results will be communicated to you by Studentboxhart, letter or phone within 4 business days after the clinic has received the results. If you do not hear from us within 7 days, please contact the clinic through MyChart or phone. If you have a critical or abnormal lab result, we will notify you by phone as soon as possible.  Submit refill requests through [employer] or call your pharmacy and they will forward the refill request to us. Please allow 3 business days for your refill to be completed.          Additional Information About Your Visit        [employer] Information     [employer] lets you send messages to your doctor, view your test results, renew your prescriptions, schedule  "appointments and more. To sign up, go to [email or web address] . Click on \"Log in\" on the left side of the screen, which will take you to the Welcome page. Then click on \"Sign up Now\" on the right side of the page.     You will be asked to enter the access code listed below, as well as some personal information. Please follow the directions to create your username and password.     Your access code is: [email or web address]  Expires: 2017  3:12 PM     Your access code will  in 90 days. If you need help or a new code, please call your [facility] clinic or [phone].        Care EveryWhere ID     This is your Care EveryWhere ID. This could be used by other organizations to access your [facility] medical records  [id number]        Your Vitals Were     Pulse Respirations Pulse Oximetry             60 14 99%          Blood Pressure from Last 3 Encounters:   17 96/66   10/11/17 92/64   17 92/60    Weight from Last 3 Encounters:   17 170 lb (77.1 kg)   17 194 lb 0.1 oz (88 kg)   17 182 lb (82.6 kg)              Today, you had the following     No orders found for display         Today's Medication Changes          These changes are accurate as of: 17 11:59 PM.  If you have any questions, ask your nurse or doctor.               These medicines have changed or have updated prescriptions.        Dose/Directions    acetaminophen 325 MG tablet   Commonly known as:  TYLENOL   This may have changed:    - when to take this  - reasons to take this   Used for:  Femoral neck fracture, right, closed, initial encounter        Dose:  650 mg   Take 2 tablets (650 mg) by mouth every 6 hours   Quantity:  100 tablet   Refills:  0       simethicone 80 MG chewable tablet   Commonly known as:  MYLICON   This may have changed:    - when to take this  - reasons to take this   Used for:  Slow transit constipation        Dose:  80 mg   Take 1 tablet (80 mg) by mouth 4 times daily   Quantity:  180 tablet "   Refills:  5       warfarin 3 MG tablet   Commonly known as:  COUMADIN   This may have changed:    - how much to take  - how to take this  - when to take this  - additional instructions   Used for:  Cerebrovascular accident (CVA) due to embolism of right middle cerebral artery (H)        Take 3mgtonight and tomorrow night. Follow up with coumadin clinic on Tuesday for further dosing   Quantity:  180 tablet   Refills:  3                Primary Care Provider Office Phone # Fax #    Thomas Dill [phone]       [address] [facility] [address]  [facility] [address]        Equal Access to Services     [facility]: Hadii emily ku hadasho Soomaali, waaxda luqadaha, qaybta kaalmada adetrent, willie dorman . So [facility] [phone].    ATENCIÓN: Si habla español, tiene a smith disposición servicios gratuitos de asistencia lingüística. [facility] [phone].    We comply with applicable federal civil rights laws and [place] laws. We do not discriminate on the basis of race, color, national origin, age, disability, sex, sexual orientation, or gender identity.            Thank you!     Thank you for choosing [facility]  for your care. Our goal is always to provide you with excellent care. Hearing back from our patients is one way we can continue to improve our services. Please take a few minutes to complete the written survey that you may receive in the mail after your visit with us. Thank you!             Your Updated Medication List - Protect others around you: Learn how to safely use, store and throw away your medicines at www.disposemymeds.org.          This list is accurate as of: 11/14/17 11:59 PM.  Always use your most recent med list.                   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    Hypoglycemia       bisacodyl 10 MG Suppository    DULCOLAX    5 suppository    Place 1 suppository (10 mg) rectally daily as needed for constipation    Drug-induced constipation       blood glucose monitoring lancets     1 Box    Use to test blood sugars 2 times daily or as directed.    Diabetes mellitus, type 2 (H)       calcium carbonate-vitamin D 500-400 MG-UNIT Tabs per tablet     90 tablet    Take 1 tablet by mouth daily    Cardiac arrhythmia, unspecified cardiac arrhythmia type       finasteride 5 MG tablet    PROSCAR    90 tablet    Take 1 tablet (5 mg) by mouth daily    Incomplete bladder emptying       furosemide 20 MG tablet    LASIX    20 tablet    Take 1 tablet (20 mg) by mouth as needed    Chronic systolic congestive heart failure (H)       gabapentin 100 MG capsule    NEURONTIN    60 capsule    Take 1 capsule (100 mg) by mouth 2 times daily    Left foot pain       ketotifen 0.025 % Soln ophthalmic solution    ZADITOR/REFRESH ANTI-ITCH    1 Bottle    Place 1 drop into both eyes 2 times daily    Allergic conjunctivitis, bilateral       levETIRAcetam 750 MG tablet    KEPPRA    180 tablet    TAKE 1 TABLET (750 MG) BY MOUTH 2 TIMES DAILY    Convulsions, unspecified convulsion type (H)       loratadine 10 MG tablet    CLARITIN    90 tablet    TAKE 1 TABLET (10 MG) BY MOUTH DAILY    Allergic rhinitis       losartan  25 MG tablet    COZAAR    45 tablet    Take 0.5 tablets (12.5 mg) by mouth daily    CHF (congestive heart failure), NYHA class IV, chronic, systolic (H)       magnesium oxide 400 MG tablet    MAG-OX    60 tablet    Take 1 tablet (400 mg) by mouth 2 times daily    Abdominal distention, Drug-induced constipation       melatonin 3 MG tablet      Take 1 tablet (3 mg) by mouth At Bedtime    Insomnia, unspecified type       nitroGLYcerin 0.4 MG sublingual tablet    NITROSTAT    30 tablet    Place 1 tablet (0.4 mg) under the tongue every 5 minutes as needed for chest pain    Coronary artery disease involving native coronary artery with unstable angina pectoris (H)       nystatin 397487 UNIT/GM Powd    MYCOSTATIN    60 g    Apply to affected areas of skin in the groin and on the scrotum three times a day as needed.    Intertriginous dermatitis associated with moisture       * order for DME     1 Device    Equipment being ordered: Lift chair.  Please see indications and face-to-face encounter details in 2/3/15 Office Visit note.    Fracture of femoral neck, right, closed, initial encounter, Stroke (H), CHF (congestive heart failure), NYHA class IV (H)       * order for DME     2 each    Equipment being ordered: Bilateral leg supports for manual wheelchair.    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  Please fax to [facility]; I called them 11/26/16 and they requested the new order.  Face to face is in my 10/26/16 note.    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  Dx: previous stroke with left hemiplegia.  Duration of need: 99 months.    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    Gastrointestinal hemorrhage associated with chronic superficial gastritis       SENEXON-S 8.6-50 MG per tablet   Generic drug:  senna-docusate     60 tablet    TAKE 1-2 TABLETS BY MOUTH 2 TIMES DAILY AS NEEDED FOR CONSTIPATION    Slow transit constipation       simethicone 80 MG chewable tablet    MYLICON    180 tablet    Take 1 tablet (80 mg) by mouth 4 times daily    Slow transit constipation       tamsulosin 0.4 MG capsule    FLOMAX    90 capsule    TAKE 1 CAPSULE EVERY DAY    Incomplete bladder emptying       thiamine 100 MG tablet     90 tablet     TAKE 1 TABLET (100 MG) BY MOUTH DAILY    Cerebrovascular accident (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 Take 25mg by mouth every evening.        warfarin 3 MG tablet    COUMADIN    180 tablet    Take 3mgtonight and tomorrow night. Follow up with coumadin clinic on Tuesday for further dosing    Cerebrovascular accident (CVA) due to embolism of right middle cerebral artery (H)       * Notice:  This list has 8 medication(s) that are the same as other medications prescribed for you. Read the directions carefully, and ask your doctor or other care provider to review them with you.

## 2017-11-14 NOTE — Clinical Note
I need help with this one.  Patient does not identify a previous PCP prior to my meeting him, as he got most of his care from Dr. Evno Lemons [facility].  He is willing to go to a clinic for primary care.  He needs stretcher transport, as he can't sit in his wheelchair for any more than an hour.  Ideally, his PCP visits could be on the same date as his [facility] visits so transportation only needs to be arranged once.  Please use daughters as contacts to schedule this; no  is required with them.  No Complex Care follow-up is needed; has Nephrology and Cardiology follow-up within the next month.

## 2017-11-17 NOTE — PROGRESS NOTES
Nephrology    CC: CKD referral     HPI:  Sohan Rendon is a 67 year old male with pmh of ICM s/p LVAD with recurrent strokes and also hematuria and GI bleeds on anticoagulation, CKD, BPH s/p TURP, and prior HTN who presents for referral for progressive advancement of CKD.     The patient has h/o ICM and reports that in 2012 he received an LVAD, HM II as destination therapy her chart review. He has had recurrent thrombosis of his LVAD with multiple embolic CVAs. His anticoagulation has been complicated by recurrent GI bleeding with both angiodysplasia and duodenitis by EGD in his chart and hematuria. The patient, from his CVAs, is resulting bed bound, but aggressive medical care rather than a palliative approach have been continued, per chart review at the patient and family's request.     His creatinine has been seen to have fluctuated over time, roughly 1.2-2.0 in 2014, 1.0-1.4 in 2015, 1.2-2.0 in 2016, and in 2017 having risen from 1.5 to 2.2 back to 1.5 and then rising to 1.8-2.2 since June. His kidneys appear atrophic and irregular by non-con CT 5/17 with R kidney 8.3 cm without hydronephrosis by US 5/17. He has long hematuria hx by UAs and this hx of  bleeding documented in his Urology notes with proteinuria seen on UAs with significant bleeding, but 8/17 and 9/17 UA without albuminuria (total proteinuria never quantified). He has one A1c of 8.0 in 2014 and reports brief hx of diabetes that was treated/resolved with improved diet. 2016 A1c 5.2 and 2017 A1c 4.8. He has had many CTs, the vast majority being non-contrast but occasional use of contrast for angiograms for his several stroke workups.     As a result of his most recent creatinine trend, his PCP in 8/17, it appears after discussion with Cardiology, decreased his losartan from 25mg to 12.5mg daily. His daughter also reports that his Protonix was decreased from 40mg to 20mg daily with the same concern.     Pt denies LE swelling and hasn't taken PRN  Please review and advise.   Lasix in a month. No SOB. No CP.     No NSAID use. No known fhx of kidney issues. No autoimmune hx. No abd pain. No skin rashes. HIV and Hep C negative, cleared Hep B by prior labs.       Past Medical History:   Diagnosis Date     Acute right MCA stroke (H) 6/2013    With L sided hemiparesis      Anemia of chronic disease     Baseline Hb 8-9     BPH (benign prostatic hyperplasia)      CHF (congestive heart failure), NYHA class IV (H) 10/9/2012    s/p HeartMate II.  Was on milrinone and dobutamine prior to LVAD      CKD (chronic kidney disease)     Baseline Cr=     Clostridium difficile colitis 12/2012      Dysphagia     PEG tube placed in 2012.  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  Normal carotid duplex 3/2014.       Esophagitis 12/2012    EGD with esophagitis and multiple douenal ulcers     Fracture of femoral neck, right, closed (H) 2/3/2015    Presumed pathologic as patient is non-weight-bearing and suffered no trauma.  Family declined operative repair during hospital stay 1/23 - 1/30/15.     Gastric and duodenal angiodysplasia with hemorrhage 6/18/2015     GERD (gastroesophageal reflux disease)      Gout      Hematuria      HTN (hypertension)     LDL=59 (3/29/2014)     Hyperlipaemia      Ischemic cardiomyopathy      Mitral regurgitation     s/p MVR with Carbomedics ring      Myocardial infarction 1998    In [facility] without intervention      Nonsenile cataract     BE     PFO (patent foramen ovale)     s/p closure (10/9/2012)     TB lung, latent     s/p 9 months INH in 2012          Past Surgical History:   Procedure Laterality Date     C CABG, VEIN, FIVE  2001     CATARACT IOL, RT/LT Right 11/19/2015     ENDOSCOPIC RETROGRADE CHOLANGIOPANCREATOGRAM N/A 5/9/2017    Procedure: COMBINED ENDOSCOPIC RETROGRADE CHOLANGIOPANCREATOGRAPHY, PLACE TUBE/STENT;  Endoscopic Retrograde Cholangiopancreatogram, Stent (Maite  Biliary 7fr 12cm) Placement;  Surgeon: Cristo Grove MD;  Location: [facility] OR     ESOPHAGOSCOPY, GASTROSCOPY, DUODENOSCOPY (EGD), COMBINED N/A 6/10/2015    Procedure: COMBINED ESOPHAGOSCOPY, GASTROSCOPY, DUODENOSCOPY (EGD);  Surgeon: Edu Jenkins MD;  Location: [facility] GI     ESOPHAGOSCOPY, GASTROSCOPY, DUODENOSCOPY (EGD), COMBINED N/A 6/15/2015    Procedure: COMBINED ESOPHAGOSCOPY, GASTROSCOPY, DUODENOSCOPY (EGD);  Surgeon: Yury Bonner MD;  Location: [facility] OR     H INSERT ICD  2/10/2011    And pacemaker.  Not BiV     INSERT VENTRICULAR ASSIST DEVICE LEFT (HEARTMATE II)  10/9/2012     IR GASTRO JEJUNOSTOMY TUBE PLACEMENT       PHACOEMULSIFICATION CLEAR CORNEA WITH STANDARD INTRAOCULAR LENS IMPLANT Right 11/19/2015    Procedure: PHACOEMULSIFICATION CLEAR CORNEA WITH STANDARD INTRAOCULAR LENS IMPLANT;  Surgeon: Violeta Cosme MD;  Location: [facility] OR     REPAIR PATENT FORAMEN OVALE  10/9/2012     REPAIR VALVE MITRAL  2/9/2012    28 mm Carbomedics 2/3 ring         Family History   Problem Relation Age of Onset     Family History Negative No family hx of      Glaucoma No family hx of      Macular Degeneration No family hx of      CANCER No family hx of      no skin cancer   Denies known fhx of kidney disease or dialysis needs.     Social History   Substance Use Topics     Smoking status: Former Smoker     Smokeless tobacco: Former User     Alcohol use No   From [place]. Here with daughter.       Medications:  Current Outpatient Prescriptions   Medication Sig Dispense Refill     magnesium oxide (MAG-OX) 400 MG tablet Take 1 tablet (400 mg) by mouth 2 times daily 60 tablet 1     pantoprazole (PROTONIX) 20 MG EC tablet Take by mouth 30-60 minutes before a meal. 90 tablet 3     ALEK CONTOUR test strip USE TO TEST BLOOD SUGAR 2 TIMES DAILY OR AS DIRECTED. 100 strip 2     nystatin (MYCOSTATIN) 436643 UNIT/GM POWD Apply to affected areas of skin in the groin and on the scrotum three times a day as needed. 60 g 3      gabapentin (NEURONTIN) 100 MG capsule Take 1 capsule (100 mg) by mouth 2 times daily 60 capsule 3     loratadine (CLARITIN) 10 MG tablet TAKE 1 TABLET (10 MG) BY MOUTH DAILY 90 tablet 2     order for DME Bilateral heel protective cushioning boots.  Dx: previous stroke with left hemiplegia.  Duration of need: 99 months. 2 each 0     Metoprolol Succinate (TOPROL XL PO) Take 50mg by mouth every morning. Take 25mg by mouth every evening.       losartan (COZAAR) 25 MG tablet Take 0.5 tablets (12.5 mg) by mouth daily 45 tablet 3     levETIRAcetam (KEPPRA) 750 MG tablet TAKE 1 TABLET (750 MG) BY MOUTH 2 TIMES DAILY 180 tablet 3     tamsulosin (FLOMAX) 0.4 MG capsule TAKE 1 CAPSULE EVERY DAY 90 capsule 3     furosemide (LASIX) 20 MG tablet Take 1 tablet (20 mg) by mouth as needed 20 tablet 11     warfarin (COUMADIN) 3 MG tablet Take 3mgtonight and tomorrow night. Follow up with coumadin clinic on Tuesday for further dosing (Patient taking differently: Take 3 mg by mouth daily ) 180 tablet 3     ketotifen (ZADITOR/REFRESH ANTI-ITCH) 0.025 % SOLN ophthalmic solution Place 1 drop into both eyes 2 times daily 1 Bottle 11     ASPIRIN NOT PRESCRIBED (INTENTIONAL) Please choose reason not prescribed, below 0 each 0     SENEXON-S 8.6-50 MG per tablet TAKE 1-2 TABLETS BY MOUTH 2 TIMES DAILY AS NEEDED FOR CONSTIPATION 60 tablet 3     bisacodyl (DULCOLAX) 10 MG Suppository Place 1 suppository (10 mg) rectally daily as needed for constipation 5 suppository 1     finasteride (PROSCAR) 5 MG tablet Take 1 tablet (5 mg) by mouth daily 90 tablet 3     atorvastatin (LIPITOR) 10 MG tablet TAKE 1 TABLET (10 MG) BY MOUTH DAILY 90 tablet 3     calcium carbonate-vitamin D 500-400 MG-UNIT TABS per tablet Take 1 tablet by mouth daily 90 tablet 3     simethicone (MYLICON) 80 MG chewable tablet Take 1 tablet (80 mg) by mouth 4 times daily (Patient taking differently: Take 80 mg by mouth as needed ) 180 tablet 5     oxyCODONE (ROXICODONE) 5 MG IR  tablet Take 1 tablet (5 mg) by mouth every 4 hours as needed for moderate to severe pain 30 tablet 0     melatonin 3 MG tablet Take 1 tablet (3 mg) by mouth At Bedtime       Thiamine HCl (VITAMIN B-1) 100 MG TABS TAKE 1 TABLET (100 MG) BY MOUTH DAILY 90 tablet 3     order for DME Equipment being ordered: Cushioned heel boots. 2 each 0     allopurinol (ZYLOPRIM) 100 MG tablet Take 1 tablet (100 mg) by mouth daily 90 tablet 3     order for DME Equipment being ordered: Wheelchair, manual, with elevated leg rests and tilt in space back.  Please fax to [employer]; I called them 11/26/16 and they requested the new order.  Face to face is in my 10/26/16 note. 1 each 0     order for DME Equipment being ordered: Wheelchair, manual. 1 each 0     order for DME Equipment being ordered: Hospital Bed, fully electric. 1 each 0     order for DME Equipment being ordered: Reclining manual w/c with bilateral elevating leg rests. 1 each 0     order for DME Equipment being ordered: Bilateral leg supports for manual wheelchair. 2 each 0     nitroglycerin (NITROSTAT) 0.4 MG SL tablet Place 1 tablet (0.4 mg) under the tongue every 5 minutes as needed for chest pain 30 tablet 1     blood glucose monitoring (NO BRAND SPECIFIED) lancets Use to test blood sugars 2 times daily or as directed. 1 Box 3     ORDER FOR DME Equipment being ordered: Lift chair.    Please see indications and face-to-face encounter details in 2/3/15 Office Visit note. 1 Device 0     acetaminophen (TYLENOL) 325 MG tablet Take 2 tablets (650 mg) by mouth every 6 hours (Patient taking differently: Take 650 mg by mouth as needed ) 100 tablet 0       Review Of Systems:  Constitutional: Negative  Eyes: negative  Ears/Nose/Throat: negative  Cardiovascular: LVAD in 2012  Respiratory: No shortness of breath, dyspnea on exertion, cough, or hemoptysis  Gastrointestinal: negative  Genitourinary: negative  Musculoskeletal: bilat LE pain  Skin: negative  Neurologic: stroke  Endocrine:  "negative  Hematologic/Lymphatic/Immunologic: negative  Psychiatric: negative    OBJECTIVE:  Physical exam:  BP 90/65  Pulse 62  Temp 98.1  F (36.7  C) (Oral)  Resp 16  Ht 1.727 m (5' 8\")  Wt 81.6 kg (180 lb)  SpO2 100%  BMI 27.37 kg/m2  Body mass index is 27.37 kg/(m^2).   Gen:NAD, bed bound and currently on stretcher   HEENT: normocephalic, atraumatic, anicteric   CV: LVAD hum   Resp: clear to ausculation bilaterally anteriorly, normal respiratory effort  Abd: bowel sounds presents, soft, non-tender, non-distended, no masses or hepatosplenomegaly appreciated   Ext: WWP, no LE edema   Skin: warm and dry, no rashes or ecchymoses apprecaited   Psych: normal mood, normal affect  Neuro: alert and oriented, CN2-12 grossly intact but no movement of LEs or LUE     Recent Labs   Lab Test  11/17/17   0748  10/12/17   0830   NA  138  139   POTASSIUM  4.1  4.0   CHLORIDE  109  107   CO2  21  22   ANIONGAP  8  10   GLC  120*  133*   BUN  16  28   CR  1.84*  2.10*   JOSÉ  8.5  8.4*     Lab Results   Component Value Date    WBC 6.2 11/17/2017     Lab Results   Component Value Date    RBC 4.20 11/17/2017     Lab Results   Component Value Date    HGB 11.9 11/17/2017     Lab Results   Component Value Date    HCT 38.8 11/17/2017     No components found for: MCT  Lab Results   Component Value Date    MCV 92 11/17/2017     Lab Results   Component Value Date    MCH 28.3 11/17/2017     Lab Results   Component Value Date    MCHC 30.7 11/17/2017     Lab Results   Component Value Date    RDW 18.8 11/17/2017     Lab Results   Component Value Date     11/17/2017       Vit D 18    Ferritin 70  Iron sat 13%      Vit D screen 18      ASSESSMENT/PLAN:  Pt is a 67 year old male here to establish care.    Sohan was seen today for allied health visit.    Diagnoses and all orders for this visit:    Stage 3 chronic kidney disease  Patient with longstanding CKD and labile creatinine historically. His more recent linear rise " in creatinine now improved today (eGFR 45 up from 38) and appearing consistent with this historic trend. Cause of CKD unclear, several prior HEATH events with prior likely HF components and prior CT contrast. HTN hx (no longer HTN with systolic HF s/p LVAD) and CAD, possible contributions from nephrosclerosis and renovascular disease. Thromboembolic events to kidney also quite possible with irregular atrophic kidneys on imaging being consistent (but not diagnostic) of this. No role for renogram to prove embolism with no change in management (supportive for CKD, anticoagulation for LVAD). No proteinuria (albuminuria by UA, though pt unable to give urine today for total proteinuria assessment - would recommend total proteinuria assessment when able to r/o other causes likely multiple myeloma) and hematuria otherwise explained. PPIs are associated with interstitial nephritis and CKD, but this appears less likely given other likely explanations for pt's CKD. Could consider trial off PPI, see GERD below for full discussion. Losartan likely protecting renal function at this eGFR level and without GFR improvement with dose reduction from 25mg to 12.5mg in 8/17, dose did not appear too high at 25mg. Thus would be reasonable to increase dose (to 25mg or higher) as Cardiology deems necessary (no need to increase dose today as not hypertensive). No role for kidney biopsy at this time, with which family strongly agreed. No plan for planned f/u appointment at this time given lability, but feel free to call or send patient back to Renal clinic for further and persistent eGFR decline or further questions.       Ischemic cardiomyopathy  LVAD (left ventricular assist device) present  Chronic systolic congestive heart failure, NYHA class 4 (H)  Recurrent CVAs   Patient following with cardiology.     Gastroesophageal reflux disease with esophagitis  Gastric and duodenal angiodysplasia with hemorrhage  Patient on PPI for years per  daughter, preceding LVAD due to GERD. Given association between PPI use and interstitial nephritis, this is possible cause of his CKD, though less likely than other above causes. If PCP and cardiology (given LVAD angiodysplasia) feel H2RA like Zantac would be appropriate stomach acid suppression, then would recommend trial off PPI (and on H2RA), though if patient requires PPI use, reasonable to continue given low likelihood of resulting improvement in CKD. Unclear utility in dose reduction but continuation of his Protonix, if continued can do so at whatever necessary dose (prior 40mg daily, now 20mg daily).       Benign prostatic hyperplasia with urinary retention  Patient previously following with Urology (also for hematuria and recurrent UTI), though hasn't seen since 2016. No hydronephrosis on UA 5/17.     Anemia   Iron deficiency anemia due to chronic blood loss  Patient with iron deficiency on his iron panel. This (along with its causes - bleeding and hemolysis with LVAD) and CKD at this stage are contributors to his anemia, but would replete his iron prior to considering ESAs/Epo (CKD Hgb goal 9.5-10). Did not start oral iron today as will defer to PCP (patient either to establish new PCP or states cardiology served as his prior PCP) for f/u of dosing/titration of oral iron.       Volume status  Patient euvolemic on exam. Not hypertensive (and hemodynamics obviously complicated by LVAD presence). No changed to rare use of Lasix PRN.     Electrolytes  No acute issues. Potassium at 4.1.     Acid-base  Bicarb reasonable at 21, chronic goal 22-24 but all other recent values exactly in goal.     CKD-MBD  Calcium and phos WNL. PTH not at concerning level, showing secondary hyperparathyroidism at 136. Vitamin D low at 18, reasonable to start cholecalciferol at his current stage 3 CKD for treatment (currently on 400 units vit D daily - may be increased to replacement dosing).       Return to clinic as needed, though  no planned f/u at this time.     Patient seen and discussed with Dr. Freeman who agrees with this plan.    Jaleel Guy MD  Renal Fellow   Pager [phone]    I have seen and reviewed the patient with the fellow. The fellow's note reflects our joint assessment and plan. -- Jonah Freeman M.D.

## 2017-11-17 NOTE — LETTER
11/17/2017       RE: Sohan Rendon  [address]   [facility] [id number]     Dear Colleague,    Thank you for referring your patient, Sohan Rendon, to the [facility] NEPHROLOGY at [facility]. Please see a copy of my visit note below.    Nephrology    CC: CKD referral     HPI:  Sohan Rendon is a 67 year old male with pmh of ICM s/p LVAD with recurrent strokes and also hematuria and GI bleeds on anticoagulation, CKD, BPH s/p TURP, and prior HTN who presents for referral for progressive advancement of CKD.     The patient has h/o ICM and reports that in 2012 he received an LVAD, HM II as destination therapy her chart review. He has had recurrent thrombosis of his LVAD with multiple embolic CVAs. His anticoagulation has been complicated by recurrent GI bleeding with both angiodysplasia and duodenitis by EGD in his chart and hematuria. The patient, from his CVAs, is resulting bed bound, but aggressive medical care rather than a palliative approach have been continued, per chart review at the patient and family's request.     His creatinine has been seen to have fluctuated over time, roughly 1.2-2.0 in 2014, 1.0-1.4 in 2015, 1.2-2.0 in 2016, and in 2017 having risen from 1.5 to 2.2 back to 1.5 and then rising to 1.8-2.2 since June. His kidneys appear atrophic and irregular by non-con CT 5/17 with R kidney 8.3 cm without hydronephrosis by US 5/17. He has long hematuria hx by UAs and this hx of  bleeding documented in his Urology notes with proteinuria seen on UAs with significant bleeding, but 8/17 and 9/17 UA without albuminuria (total proteinuria never quantified). He has one A1c of 8.0 in 2014 and reports brief hx of diabetes that was treated/resolved with improved diet. 2016 A1c 5.2 and 2017 A1c 4.8. He has had many CTs, the vast majority being non-contrast but occasional use of contrast for angiograms for his several stroke workups.     As a result of his most recent  creatinine trend, his PCP in 8/17, it appears after discussion with Cardiology, decreased his losartan from 25mg to 12.5mg daily. His daughter also reports that his Protonix was decreased from 40mg to 20mg daily with the same concern.     Pt denies LE swelling and hasn't taken PRN Lasix in a month. No SOB. No CP.     No NSAID use. No known fhx of kidney issues. No autoimmune hx. No abd pain. No skin rashes. HIV and Hep C negative, cleared Hep B by prior labs.       Past Medical History:   Diagnosis Date     Acute right MCA stroke (H) 6/2013    With L sided hemiparesis      Anemia of chronic disease     Baseline Hb 8-9     BPH (benign prostatic hyperplasia)      CHF (congestive heart failure), NYHA class IV (H) 10/9/2012    s/p HeartMate II.  Was on milrinone and dobutamine prior to LVAD      CKD (chronic kidney disease)     Baseline Cr=     Clostridium difficile colitis 12/2012      Dysphagia     PEG tube placed in 2012.  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  Normal carotid duplex 3/2014.       Esophagitis 12/2012    EGD with esophagitis and multiple douenal ulcers     Fracture of femoral neck, right, closed (H) 2/3/2015    Presumed pathologic as patient is non-weight-bearing and suffered no trauma.  Family declined operative repair during hospital stay 1/23 - 1/30/15.     Gastric and duodenal angiodysplasia with hemorrhage 6/18/2015     GERD (gastroesophageal reflux disease)      Gout      Hematuria      HTN (hypertension)     LDL=59 (3/29/2014)     Hyperlipaemia      Ischemic cardiomyopathy      Mitral regurgitation     s/p MVR with Carbomedics ring      Myocardial infarction 1998    In [facility] without intervention      Nonsenile cataract     BE     PFO (patent foramen ovale)     s/p closure (10/9/2012)     TB lung, latent     s/p 9 months INH in 2012          Past Surgical History:   Procedure Laterality Date      C CABG, VEIN, FIVE  2001     CATARACT IOL, RT/LT Right 11/19/2015     ENDOSCOPIC RETROGRADE CHOLANGIOPANCREATOGRAM N/A 5/9/2017    Procedure: COMBINED ENDOSCOPIC RETROGRADE CHOLANGIOPANCREATOGRAPHY, PLACE TUBE/STENT;  Endoscopic Retrograde Cholangiopancreatogram, Stent (Zimmon Biliary 7fr 12cm) Placement;  Surgeon: Cristo Grove MD;  Location: [facility] OR     ESOPHAGOSCOPY, GASTROSCOPY, DUODENOSCOPY (EGD), COMBINED N/A 6/10/2015    Procedure: COMBINED ESOPHAGOSCOPY, GASTROSCOPY, DUODENOSCOPY (EGD);  Surgeon: Edu Jenkins MD;  Location: [facility] GI     ESOPHAGOSCOPY, GASTROSCOPY, DUODENOSCOPY (EGD), COMBINED N/A 6/15/2015    Procedure: COMBINED ESOPHAGOSCOPY, GASTROSCOPY, DUODENOSCOPY (EGD);  Surgeon: Yury Bonner MD;  Location: [facility] OR     H INSERT ICD  2/10/2011    And pacemaker.  Not BiV     INSERT VENTRICULAR ASSIST DEVICE LEFT (HEARTMATE II)  10/9/2012     IR GASTRO JEJUNOSTOMY TUBE PLACEMENT       PHACOEMULSIFICATION CLEAR CORNEA WITH STANDARD INTRAOCULAR LENS IMPLANT Right 11/19/2015    Procedure: PHACOEMULSIFICATION CLEAR CORNEA WITH STANDARD INTRAOCULAR LENS IMPLANT;  Surgeon: Violeta Cosme MD;  Location: [facility] OR     REPAIR PATENT FORAMEN OVALE  10/9/2012     REPAIR VALVE MITRAL  2/9/2012    28 mm Carbomedics 2/3 ring         Family History   Problem Relation Age of Onset     Family History Negative No family hx of      Glaucoma No family hx of      Macular Degeneration No family hx of      CANCER No family hx of      no skin cancer   Denies known fhx of kidney disease or dialysis needs.     Social History   Substance Use Topics     Smoking status: Former Smoker     Smokeless tobacco: Former User     Alcohol use No   From [place]. Here with daughter.       Medications:  Current Outpatient Prescriptions   Medication Sig Dispense Refill     magnesium oxide (MAG-OX) 400 MG tablet Take 1 tablet (400 mg) by mouth 2 times daily 60 tablet 1     pantoprazole (PROTONIX) 20 MG EC tablet Take by mouth 30-60  minutes before a meal. 90 tablet 3     ALEK CONTOUR test strip USE TO TEST BLOOD SUGAR 2 TIMES DAILY OR AS DIRECTED. 100 strip 2     nystatin (MYCOSTATIN) 209776 UNIT/GM POWD Apply to affected areas of skin in the groin and on the scrotum three times a day as needed. 60 g 3     gabapentin (NEURONTIN) 100 MG capsule Take 1 capsule (100 mg) by mouth 2 times daily 60 capsule 3     loratadine (CLARITIN) 10 MG tablet TAKE 1 TABLET (10 MG) BY MOUTH DAILY 90 tablet 2     order for DME Bilateral heel protective cushioning boots.  Dx: previous stroke with left hemiplegia.  Duration of need: 99 months. 2 each 0     Metoprolol Succinate (TOPROL XL PO) Take 50mg by mouth every morning. Take 25mg by mouth every evening.       losartan (COZAAR) 25 MG tablet Take 0.5 tablets (12.5 mg) by mouth daily 45 tablet 3     levETIRAcetam (KEPPRA) 750 MG tablet TAKE 1 TABLET (750 MG) BY MOUTH 2 TIMES DAILY 180 tablet 3     tamsulosin (FLOMAX) 0.4 MG capsule TAKE 1 CAPSULE EVERY DAY 90 capsule 3     furosemide (LASIX) 20 MG tablet Take 1 tablet (20 mg) by mouth as needed 20 tablet 11     warfarin (COUMADIN) 3 MG tablet Take 3mgtonight and tomorrow night. Follow up with coumadin clinic on Tuesday for further dosing (Patient taking differently: Take 3 mg by mouth daily ) 180 tablet 3     ketotifen (ZADITOR/REFRESH ANTI-ITCH) 0.025 % SOLN ophthalmic solution Place 1 drop into both eyes 2 times daily 1 Bottle 11     ASPIRIN NOT PRESCRIBED (INTENTIONAL) Please choose reason not prescribed, below 0 each 0     SENEXON-S 8.6-50 MG per tablet TAKE 1-2 TABLETS BY MOUTH 2 TIMES DAILY AS NEEDED FOR CONSTIPATION 60 tablet 3     bisacodyl (DULCOLAX) 10 MG Suppository Place 1 suppository (10 mg) rectally daily as needed for constipation 5 suppository 1     finasteride (PROSCAR) 5 MG tablet Take 1 tablet (5 mg) by mouth daily 90 tablet 3     atorvastatin (LIPITOR) 10 MG tablet TAKE 1 TABLET (10 MG) BY MOUTH DAILY 90 tablet 3     calcium carbonate-vitamin D  500-400 MG-UNIT TABS per tablet Take 1 tablet by mouth daily 90 tablet 3     simethicone (MYLICON) 80 MG chewable tablet Take 1 tablet (80 mg) by mouth 4 times daily (Patient taking differently: Take 80 mg by mouth as needed ) 180 tablet 5     oxyCODONE (ROXICODONE) 5 MG IR tablet Take 1 tablet (5 mg) by mouth every 4 hours as needed for moderate to severe pain 30 tablet 0     melatonin 3 MG tablet Take 1 tablet (3 mg) by mouth At Bedtime       Thiamine HCl (VITAMIN B-1) 100 MG TABS TAKE 1 TABLET (100 MG) BY MOUTH DAILY 90 tablet 3     order for DME Equipment being ordered: Cushioned heel boots. 2 each 0     allopurinol (ZYLOPRIM) 100 MG tablet Take 1 tablet (100 mg) by mouth daily 90 tablet 3     order for DME Equipment being ordered: Wheelchair, manual, with elevated leg rests and tilt in space back.  Please fax to [facility]; I called them 11/26/16 and they requested the new order.  Face to face is in my 10/26/16 note. 1 each 0     order for DME Equipment being ordered: Wheelchair, manual. 1 each 0     order for DME Equipment being ordered: Hospital Bed, fully electric. 1 each 0     order for DME Equipment being ordered: Reclining manual w/c with bilateral elevating leg rests. 1 each 0     order for DME Equipment being ordered: Bilateral leg supports for manual wheelchair. 2 each 0     nitroglycerin (NITROSTAT) 0.4 MG SL tablet Place 1 tablet (0.4 mg) under the tongue every 5 minutes as needed for chest pain 30 tablet 1     blood glucose monitoring (NO BRAND SPECIFIED) lancets Use to test blood sugars 2 times daily or as directed. 1 Box 3     ORDER FOR DME Equipment being ordered: Lift chair.    Please see indications and face-to-face encounter details in 2/3/15 Office Visit note. 1 Device 0     acetaminophen (TYLENOL) 325 MG tablet Take 2 tablets (650 mg) by mouth every 6 hours (Patient taking differently: Take 650 mg by mouth as needed ) 100 tablet 0       Review Of Systems:  Constitutional: Negative  Eyes:  "negative  Ears/Nose/Throat: negative  Cardiovascular: LVAD in 2012  Respiratory: No shortness of breath, dyspnea on exertion, cough, or hemoptysis  Gastrointestinal: negative  Genitourinary: negative  Musculoskeletal: bilat LE pain  Skin: negative  Neurologic: stroke  Endocrine: negative  Hematologic/Lymphatic/Immunologic: negative  Psychiatric: negative    OBJECTIVE:  Physical exam:  BP 90/65  Pulse 62  Temp 98.1  F (36.7  C) (Oral)  Resp 16  Ht 1.727 m (5' 8\")  Wt 81.6 kg (180 lb)  SpO2 100%  BMI 27.37 kg/m2  Body mass index is 27.37 kg/(m^2).   Gen:NAD, bed bound and currently on stretcher   HEENT: normocephalic, atraumatic, anicteric   CV: LVAD hum   Resp: clear to ausculation bilaterally anteriorly, normal respiratory effort  Abd: bowel sounds presents, soft, non-tender, non-distended, no masses or hepatosplenomegaly appreciated   Ext: WWP, no LE edema   Skin: warm and dry, no rashes or ecchymoses apprecaited   Psych: normal mood, normal affect  Neuro: alert and oriented, CN2-12 grossly intact but no movement of LEs or LUE     Recent Labs   Lab Test  11/17/17   0748  10/12/17   0830   NA  138  139   POTASSIUM  4.1  4.0   CHLORIDE  109  107   CO2  21  22   ANIONGAP  8  10   GLC  120*  133*   BUN  16  28   CR  1.84*  2.10*   JOSÉ  8.5  8.4*     Lab Results   Component Value Date    WBC 6.2 11/17/2017     Lab Results   Component Value Date    RBC 4.20 11/17/2017     Lab Results   Component Value Date    HGB 11.9 11/17/2017     Lab Results   Component Value Date    HCT 38.8 11/17/2017     No components found for: MCT  Lab Results   Component Value Date    MCV 92 11/17/2017     Lab Results   Component Value Date    MCH 28.3 11/17/2017     Lab Results   Component Value Date    MCHC 30.7 11/17/2017     Lab Results   Component Value Date    RDW 18.8 11/17/2017     Lab Results   Component Value Date     11/17/2017       Vit D 18    Ferritin 70  Iron sat 13%      Vit D screen " 18      ASSESSMENT/PLAN:  Pt is a 67 year old male here to establish care.    Sohan was seen today for allied health visit.    Diagnoses and all orders for this visit:    Stage 3 chronic kidney disease  Patient with longstanding CKD and labile creatinine historically. His more recent linear rise in creatinine now improved today (eGFR 45 up from 38) and appearing consistent with this historic trend. Cause of CKD unclear, several prior HEATH events with prior likely HF components and prior CT contrast. HTN hx (no longer HTN with systolic HF s/p LVAD) and CAD, possible contributions from nephrosclerosis and renovascular disease. Thromboembolic events to kidney also quite possible with irregular atrophic kidneys on imaging being consistent (but not diagnostic) of this. No role for renogram to prove embolism with no change in management (supportive for CKD, anticoagulation for LVAD). No proteinuria (albuminuria by UA, though pt unable to give urine today for total proteinuria assessment - would recommend total proteinuria assessment when able to r/o other causes likely multiple myeloma) and hematuria otherwise explained. PPIs are associated with interstitial nephritis and CKD, but this appears less likely given other likely explanations for pt's CKD. Could consider trial off PPI, see GERD below for full discussion. Losartan likely protecting renal function at this eGFR level and without GFR improvement with dose reduction from 25mg to 12.5mg in 8/17, dose did not appear too high at 25mg. Thus would be reasonable to increase dose (to 25mg or higher) as Cardiology deems necessary (no need to increase dose today as not hypertensive). No role for kidney biopsy at this time, with which family strongly agreed. No plan for planned f/u appointment at this time given lability, but feel free to call or send patient back to Renal clinic for further and persistent eGFR decline or further questions.       Ischemic  cardiomyopathy  LVAD (left ventricular assist device) present  Chronic systolic congestive heart failure, NYHA class 4 (H)  Recurrent CVAs   Patient following with cardiology.     Gastroesophageal reflux disease with esophagitis  Gastric and duodenal angiodysplasia with hemorrhage  Patient on PPI for years per daughter, preceding LVAD due to GERD. Given association between PPI use and interstitial nephritis, this is possible cause of his CKD, though less likely than other above causes. If PCP and cardiology (given LVAD angiodysplasia) feel H2RA like Zantac would be appropriate stomach acid suppression, then would recommend trial off PPI (and on H2RA), though if patient requires PPI use, reasonable to continue given low likelihood of resulting improvement in CKD. Unclear utility in dose reduction but continuation of his Protonix, if continued can do so at whatever necessary dose (prior 40mg daily, now 20mg daily).       Benign prostatic hyperplasia with urinary retention  Patient previously following with Urology (also for hematuria and recurrent UTI), though hasn't seen since 2016. No hydronephrosis on UA 5/17.     Anemia   Iron deficiency anemia due to chronic blood loss  Patient with iron deficiency on his iron panel. This (along with its causes - bleeding and hemolysis with LVAD) and CKD at this stage are contributors to his anemia, but would replete his iron prior to considering ESAs/Epo (CKD Hgb goal 9.5-10). Did not start oral iron today as will defer to PCP (patient either to establish new PCP or states cardiology served as his prior PCP) for f/u of dosing/titration of oral iron.       Volume status  Patient euvolemic on exam. Not hypertensive (and hemodynamics obviously complicated by LVAD presence). No changed to rare use of Lasix PRN.     Electrolytes  No acute issues. Potassium at 4.1.     Acid-base  Bicarb reasonable at 21, chronic goal 22-24 but all other recent values exactly in goal.      CKD-MBD  Calcium and phos WNL. PTH not at concerning level, showing secondary hyperparathyroidism at 136. Vitamin D low at 18, reasonable to start cholecalciferol at his current stage 3 CKD for treatment (currently on 400 units vit D daily - may be increased to replacement dosing).       Return to clinic as needed, though no planned f/u at this time.     Patient seen and discussed with Dr. Freeman who agrees with this plan.    Jaleel Guy MD  Renal Fellow   Pager [phone]    I have seen and reviewed the patient with the fellow. The fellow's note reflects our joint assessment and plan. -- Jonah Freeman M.D.      Again, thank you for allowing me to participate in the care of your patient.      Sincerely,    Jaelel Guy MD

## 2017-11-17 NOTE — PATIENT INSTRUCTIONS
If your cardiologist Dr Lemons is comfortable switching to a different stomach acid medicine we will suggest she consider doing that as a potential (small chance) cause of kidney issues.     Your losartan is likely helping your kidneys, and the dose of it can be adjusted as cardiology needs to.

## 2017-11-17 NOTE — NURSING NOTE
"Chief Complaint   Patient presents with     Allied Health Visit     CKD 3       Initial BP (!) 89/61  Pulse 62  Temp 98.1  F (36.7  C) (Oral)  Resp 16  Ht 1.727 m (5' 8\")  Wt 81.6 kg (180 lb)  SpO2 100%  BMI 27.37 kg/m2 Estimated body mass index is 27.37 kg/(m^2) as calculated from the following:    Height as of this encounter: 1.727 m (5' 8\").    Weight as of this encounter: 81.6 kg (180 lb).  Medication Reconciliation: complete     Patient arrived on a stretcher and reports HT and  WT.    "

## 2017-11-17 NOTE — LETTER
11/17/2017       RE: Sohan Rendon  [address]   [facility] [address]     Dear Colleague,    Thank you for referring your patient, Sohan Rendon, to the [facility] NEPHROLOGY at [facility]. Please see a copy of my visit note below.    Nephrology    CC: CKD referral     HPI:  Sohan Rendon is a 67 year old male with pmh of ICM s/p LVAD with recurrent strokes and also hematuria and GI bleeds on anticoagulation, CKD, BPH s/p TURP, and prior HTN who presents for referral for progressive advancement of CKD.     The patient has h/o ICM and reports that in 2012 he received an LVAD, HM II as destination therapy her chart review. He has had recurrent thrombosis of his LVAD with multiple embolic CVAs. His anticoagulation has been complicated by recurrent GI bleeding with both angiodysplasia and duodenitis by EGD in his chart and hematuria. The patient, from his CVAs, is resulting bed bound, but aggressive medical care rather than a palliative approach have been continued, per chart review at the patient and family's request.     His creatinine has been seen to have fluctuated over time, roughly 1.2-2.0 in 2014, 1.0-1.4 in 2015, 1.2-2.0 in 2016, and in 2017 having risen from 1.5 to 2.2 back to 1.5 and then rising to 1.8-2.2 since June. His kidneys appear atrophic and irregular by non-con CT 5/17 with R kidney 8.3 cm without hydronephrosis by US 5/17. He has long hematuria hx by UAs and this hx of  bleeding documented in his Urology notes with proteinuria seen on UAs with significant bleeding, but 8/17 and 9/17 UA without albuminuria (total proteinuria never quantified). He has one A1c of 8.0 in 2014 and reports brief hx of diabetes that was treated/resolved with improved diet. 2016 A1c 5.2 and 2017 A1c 4.8. He has had many CTs, the vast majority being non-contrast but occasional use of contrast for angiograms for his several stroke workups.     As a result of his most recent  creatinine trend, his PCP in 8/17, it appears after discussion with Cardiology, decreased his losartan from 25mg to 12.5mg daily. His daughter also reports that his Protonix was decreased from 40mg to 20mg daily with the same concern.     Pt denies LE swelling and hasn't taken PRN Lasix in a month. No SOB. No CP.     No NSAID use. No known fhx of kidney issues. No autoimmune hx. No abd pain. No skin rashes. HIV and Hep C negative, cleared Hep B by prior labs.       Past Medical History:   Diagnosis Date     Acute right MCA stroke (H) 6/2013    With L sided hemiparesis      Anemia of chronic disease     Baseline Hb 8-9     BPH (benign prostatic hyperplasia)      CHF (congestive heart failure), NYHA class IV (H) 10/9/2012    s/p HeartMate II.  Was on milrinone and dobutamine prior to LVAD      CKD (chronic kidney disease)     Baseline Cr=     Clostridium difficile colitis 12/2012      Dysphagia     PEG tube placed in 2012.  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  Normal carotid duplex 3/2014.       Esophagitis 12/2012    EGD with esophagitis and multiple douenal ulcers     Fracture of femoral neck, right, closed (H) 2/3/2015    Presumed pathologic as patient is non-weight-bearing and suffered no trauma.  Family declined operative repair during hospital stay 1/23 - 1/30/15.     Gastric and duodenal angiodysplasia with hemorrhage 6/18/2015     GERD (gastroesophageal reflux disease)      Gout      Hematuria      HTN (hypertension)     LDL=59 (3/29/2014)     Hyperlipaemia      Ischemic cardiomyopathy      Mitral regurgitation     s/p MVR with Carbomedics ring      Myocardial infarction 1998    In [facility] without intervention      Nonsenile cataract     BE     PFO (patent foramen ovale)     s/p closure (10/9/2012)     TB lung, latent     s/p 9 months INH in 2012          Past Surgical History:   Procedure Laterality Date      C CABG, VEIN, FIVE  2001     CATARACT IOL, RT/LT Right 11/19/2015     ENDOSCOPIC RETROGRADE CHOLANGIOPANCREATOGRAM N/A 5/9/2017    Procedure: COMBINED ENDOSCOPIC RETROGRADE CHOLANGIOPANCREATOGRAPHY, PLACE TUBE/STENT;  Endoscopic Retrograde Cholangiopancreatogram, Stent (Zimmon Biliary 7fr 12cm) Placement;  Surgeon: Cristo Grove MD;  Location: [facility] OR     ESOPHAGOSCOPY, GASTROSCOPY, DUODENOSCOPY (EGD), COMBINED N/A 6/10/2015    Procedure: COMBINED ESOPHAGOSCOPY, GASTROSCOPY, DUODENOSCOPY (EGD);  Surgeon: Edu Jenkins MD;  Location: [facility] GI     ESOPHAGOSCOPY, GASTROSCOPY, DUODENOSCOPY (EGD), COMBINED N/A 6/15/2015    Procedure: COMBINED ESOPHAGOSCOPY, GASTROSCOPY, DUODENOSCOPY (EGD);  Surgeon: Yury Bonner MD;  Location: [facility] OR     H INSERT ICD  2/10/2011    And pacemaker.  Not BiV     INSERT VENTRICULAR ASSIST DEVICE LEFT (HEARTMATE II)  10/9/2012     IR GASTRO JEJUNOSTOMY TUBE PLACEMENT       PHACOEMULSIFICATION CLEAR CORNEA WITH STANDARD INTRAOCULAR LENS IMPLANT Right 11/19/2015    Procedure: PHACOEMULSIFICATION CLEAR CORNEA WITH STANDARD INTRAOCULAR LENS IMPLANT;  Surgeon: Violeta Cosme MD;  Location: [facility] OR     REPAIR PATENT FORAMEN OVALE  10/9/2012     REPAIR VALVE MITRAL  2/9/2012    28 mm Carbomedics 2/3 ring         Family History   Problem Relation Age of Onset     Family History Negative No family hx of      Glaucoma No family hx of      Macular Degeneration No family hx of      CANCER No family hx of      no skin cancer   Denies known fhx of kidney disease or dialysis needs.     Social History   Substance Use Topics     Smoking status: Former Smoker     Smokeless tobacco: Former User     Alcohol use No   From [place]. Here with daughter.       Medications:  Current Outpatient Prescriptions   Medication Sig Dispense Refill     magnesium oxide (MAG-OX) 400 MG tablet Take 1 tablet (400 mg) by mouth 2 times daily 60 tablet 1     pantoprazole (PROTONIX) 20 MG EC tablet Take by mouth 30-60  minutes before a meal. 90 tablet 3     ALEK CONTOUR test strip USE TO TEST BLOOD SUGAR 2 TIMES DAILY OR AS DIRECTED. 100 strip 2     nystatin (MYCOSTATIN) 791237 UNIT/GM POWD Apply to affected areas of skin in the groin and on the scrotum three times a day as needed. 60 g 3     gabapentin (NEURONTIN) 100 MG capsule Take 1 capsule (100 mg) by mouth 2 times daily 60 capsule 3     loratadine (CLARITIN) 10 MG tablet TAKE 1 TABLET (10 MG) BY MOUTH DAILY 90 tablet 2     order for DME Bilateral heel protective cushioning boots.  Dx: previous stroke with left hemiplegia.  Duration of need: 99 months. 2 each 0     Metoprolol Succinate (TOPROL XL PO) Take 50mg by mouth every morning. Take 25mg by mouth every evening.       losartan (COZAAR) 25 MG tablet Take 0.5 tablets (12.5 mg) by mouth daily 45 tablet 3     levETIRAcetam (KEPPRA) 750 MG tablet TAKE 1 TABLET (750 MG) BY MOUTH 2 TIMES DAILY 180 tablet 3     tamsulosin (FLOMAX) 0.4 MG capsule TAKE 1 CAPSULE EVERY DAY 90 capsule 3     furosemide (LASIX) 20 MG tablet Take 1 tablet (20 mg) by mouth as needed 20 tablet 11     warfarin (COUMADIN) 3 MG tablet Take 3mgtonight and tomorrow night. Follow up with coumadin clinic on Tuesday for further dosing (Patient taking differently: Take 3 mg by mouth daily ) 180 tablet 3     ketotifen (ZADITOR/REFRESH ANTI-ITCH) 0.025 % SOLN ophthalmic solution Place 1 drop into both eyes 2 times daily 1 Bottle 11     ASPIRIN NOT PRESCRIBED (INTENTIONAL) Please choose reason not prescribed, below 0 each 0     SENEXON-S 8.6-50 MG per tablet TAKE 1-2 TABLETS BY MOUTH 2 TIMES DAILY AS NEEDED FOR CONSTIPATION 60 tablet 3     bisacodyl (DULCOLAX) 10 MG Suppository Place 1 suppository (10 mg) rectally daily as needed for constipation 5 suppository 1     finasteride (PROSCAR) 5 MG tablet Take 1 tablet (5 mg) by mouth daily 90 tablet 3     atorvastatin (LIPITOR) 10 MG tablet TAKE 1 TABLET (10 MG) BY MOUTH DAILY 90 tablet 3     calcium carbonate-vitamin D  500-400 MG-UNIT TABS per tablet Take 1 tablet by mouth daily 90 tablet 3     simethicone (MYLICON) 80 MG chewable tablet Take 1 tablet (80 mg) by mouth 4 times daily (Patient taking differently: Take 80 mg by mouth as needed ) 180 tablet 5     oxyCODONE (ROXICODONE) 5 MG IR tablet Take 1 tablet (5 mg) by mouth every 4 hours as needed for moderate to severe pain 30 tablet 0     melatonin 3 MG tablet Take 1 tablet (3 mg) by mouth At Bedtime       Thiamine HCl (VITAMIN B-1) 100 MG TABS TAKE 1 TABLET (100 MG) BY MOUTH DAILY 90 tablet 3     order for DME Equipment being ordered: Cushioned heel boots. 2 each 0     allopurinol (ZYLOPRIM) 100 MG tablet Take 1 tablet (100 mg) by mouth daily 90 tablet 3     order for DME Equipment being ordered: Wheelchair, manual, with elevated leg rests and tilt in space back.  Please fax to [facility]; I called them 11/26/16 and they requested the new order.  Face to face is in my 10/26/16 note. 1 each 0     order for DME Equipment being ordered: Wheelchair, manual. 1 each 0     order for DME Equipment being ordered: Hospital Bed, fully electric. 1 each 0     order for DME Equipment being ordered: Reclining manual w/c with bilateral elevating leg rests. 1 each 0     order for DME Equipment being ordered: Bilateral leg supports for manual wheelchair. 2 each 0     nitroglycerin (NITROSTAT) 0.4 MG SL tablet Place 1 tablet (0.4 mg) under the tongue every 5 minutes as needed for chest pain 30 tablet 1     blood glucose monitoring (NO BRAND SPECIFIED) lancets Use to test blood sugars 2 times daily or as directed. 1 Box 3     ORDER FOR DME Equipment being ordered: Lift chair.    Please see indications and face-to-face encounter details in 2/3/15 Office Visit note. 1 Device 0     acetaminophen (TYLENOL) 325 MG tablet Take 2 tablets (650 mg) by mouth every 6 hours (Patient taking differently: Take 650 mg by mouth as needed ) 100 tablet 0       Review Of Systems:  Constitutional: Negative  Eyes:  "negative  Ears/Nose/Throat: negative  Cardiovascular: LVAD in 2012  Respiratory: No shortness of breath, dyspnea on exertion, cough, or hemoptysis  Gastrointestinal: negative  Genitourinary: negative  Musculoskeletal: bilat LE pain  Skin: negative  Neurologic: stroke  Endocrine: negative  Hematologic/Lymphatic/Immunologic: negative  Psychiatric: negative    OBJECTIVE:  Physical exam:  BP 90/65  Pulse 62  Temp 98.1  F (36.7  C) (Oral)  Resp 16  Ht 1.727 m (5' 8\")  Wt 81.6 kg (180 lb)  SpO2 100%  BMI 27.37 kg/m2  Body mass index is 27.37 kg/(m^2).   Gen:NAD, bed bound and currently on stretcher   HEENT: normocephalic, atraumatic, anicteric   CV: LVAD hum   Resp: clear to ausculation bilaterally anteriorly, normal respiratory effort  Abd: bowel sounds presents, soft, non-tender, non-distended, no masses or hepatosplenomegaly appreciated   Ext: WWP, no LE edema   Skin: warm and dry, no rashes or ecchymoses apprecaited   Psych: normal mood, normal affect  Neuro: alert and oriented, CN2-12 grossly intact but no movement of LEs or LUE     Recent Labs   Lab Test  11/17/17   0748  10/12/17   0830   NA  138  139   POTASSIUM  4.1  4.0   CHLORIDE  109  107   CO2  21  22   ANIONGAP  8  10   GLC  120*  133*   BUN  16  28   CR  1.84*  2.10*   JOSÉ  8.5  8.4*     Lab Results   Component Value Date    WBC 6.2 11/17/2017     Lab Results   Component Value Date    RBC 4.20 11/17/2017     Lab Results   Component Value Date    HGB 11.9 11/17/2017     Lab Results   Component Value Date    HCT 38.8 11/17/2017     No components found for: MCT  Lab Results   Component Value Date    MCV 92 11/17/2017     Lab Results   Component Value Date    MCH 28.3 11/17/2017     Lab Results   Component Value Date    MCHC 30.7 11/17/2017     Lab Results   Component Value Date    RDW 18.8 11/17/2017     Lab Results   Component Value Date     11/17/2017       Vit D 18    Ferritin 70  Iron sat 13%      Vit D screen " 18      ASSESSMENT/PLAN:  Pt is a 67 year old male here to establish care.    Sohan was seen today for allied health visit.    Diagnoses and all orders for this visit:    Stage 3 chronic kidney disease  Patient with longstanding CKD and labile creatinine historically. His more recent linear rise in creatinine now improved today (eGFR 45 up from 38) and appearing consistent with this historic trend. Cause of CKD unclear, several prior HEATH events with prior likely HF components and prior CT contrast. HTN hx (no longer HTN with systolic HF s/p LVAD) and CAD, possible contributions from nephrosclerosis and renovascular disease. Thromboembolic events to kidney also quite possible with irregular atrophic kidneys on imaging being consistent (but not diagnostic) of this. No role for renogram to prove embolism with no change in management (supportive for CKD, anticoagulation for LVAD). No proteinuria (albuminuria by UA, though pt unable to give urine today for total proteinuria assessment - would recommend total proteinuria assessment when able to r/o other causes likely multiple myeloma) and hematuria otherwise explained. PPIs are associated with interstitial nephritis and CKD, but this appears less likely given other likely explanations for pt's CKD. Could consider trial off PPI, see GERD below for full discussion. Losartan likely protecting renal function at this eGFR level and without GFR improvement with dose reduction from 25mg to 12.5mg in 8/17, dose did not appear too high at 25mg. Thus would be reasonable to increase dose (to 25mg or higher) as Cardiology deems necessary (no need to increase dose today as not hypertensive). No role for kidney biopsy at this time, with which family strongly agreed. No plan for planned f/u appointment at this time given lability, but feel free to call or send patient back to Renal clinic for further and persistent eGFR decline or further questions.       Ischemic  cardiomyopathy  LVAD (left ventricular assist device) present  Chronic systolic congestive heart failure, NYHA class 4 (H)  Recurrent CVAs   Patient following with cardiology.     Gastroesophageal reflux disease with esophagitis  Gastric and duodenal angiodysplasia with hemorrhage  Patient on PPI for years per daughter, preceding LVAD due to GERD. Given association between PPI use and interstitial nephritis, this is possible cause of his CKD, though less likely than other above causes. If PCP and cardiology (given LVAD angiodysplasia) feel H2RA like Zantac would be appropriate stomach acid suppression, then would recommend trial off PPI (and on H2RA), though if patient requires PPI use, reasonable to continue given low likelihood of resulting improvement in CKD. Unclear utility in dose reduction but continuation of his Protonix, if continued can do so at whatever necessary dose (prior 40mg daily, now 20mg daily).       Benign prostatic hyperplasia with urinary retention  Patient previously following with Urology (also for hematuria and recurrent UTI), though hasn't seen since 2016. No hydronephrosis on UA 5/17.     Anemia   Iron deficiency anemia due to chronic blood loss  Patient with iron deficiency on his iron panel. This (along with its causes - bleeding and hemolysis with LVAD) and CKD at this stage are contributors to his anemia, but would replete his iron prior to considering ESAs/Epo (CKD Hgb goal 9.5-10). Did not start oral iron today as will defer to PCP (patient either to establish new PCP or states cardiology served as his prior PCP) for f/u of dosing/titration of oral iron.       Volume status  Patient euvolemic on exam. Not hypertensive (and hemodynamics obviously complicated by LVAD presence). No changed to rare use of Lasix PRN.     Electrolytes  No acute issues. Potassium at 4.1.     Acid-base  Bicarb reasonable at 21, chronic goal 22-24 but all other recent values exactly in goal.      CKD-MBD  Calcium and phos WNL. PTH not at concerning level, showing secondary hyperparathyroidism at 136. Vitamin D low at 18, reasonable to start cholecalciferol at his current stage 3 CKD for treatment (currently on 400 units vit D daily - may be increased to replacement dosing).       Return to clinic as needed, though no planned f/u at this time.     Patient seen and discussed with Dr. Freeman who agrees with this plan.    Jaleel Guy MD  Renal Fellow   Pager [phone]    I have seen and reviewed the patient with the fellow. The fellow's note reflects our joint assessment and plan. -- Jonah Freeman M.D.      Again, thank you for allowing me to participate in the care of your patient.      Sincerely,    Jaleel Guy MD

## 2017-11-17 NOTE — MR AVS SNAPSHOT
After Visit Summary   11/17/2017    Sohan Rendon    MRN: [id number]           Patient Information     Date Of Birth          1950        Visit Information        Provider Department      11/17/2017 8:00 AM Jaleel Guy MD; [employer] [facility] Nephrology        Today's Diagnoses     Iron deficiency anemia, unspecified iron deficiency anemia type    -  1    Gastroesophageal reflux disease with esophagitis        Gastric and duodenal angiodysplasia with hemorrhage        LVAD (left ventricular assist device) present        Chronic systolic congestive heart failure, NYHA class 4 (H)        Essential hypertension        Ischemic cardiomyopathy        Benign prostatic hyperplasia with urinary retention        Stage 3 chronic kidney disease        Iron deficiency anemia due to chronic blood loss          Care Instructions    If your cardiologist Dr Lemons is comfortable switching to a different stomach acid medicine we will suggest she consider doing that as a potential (small chance) cause of kidney issues.     Your losartan is likely helping your kidneys, and the dose of it can be adjusted as cardiology needs to.           Follow-ups after your visit        Your next 10 appointments already scheduled     Nov 22, 2017 11:30 AM CST   Lab with [facility]   [facility])    [address] [place] [id number] [phone]            Nov 22, 2017 12:00 PM CST   (Arrive by 11:45 AM)   Ventricular Assist Device with Vicky Ziegler NP   [facility])    [address] [place] [id number] [phone]            Dec 13, 2017 10:00 AM CST   (Arrive by 9:45 AM)   Implanted Defibulator with Uc Cv Device 1   [facility])    [address] [place] [id number]   [phone]            Dec  "13, 2017 10:40 AM CST   (Arrive by 10:25 AM)   Ventricular Assist Device with Evon Lemons MD   [facility] ([facility])    [address] [place] [id number] [phone]            Mar 09, 2018 10:30 AM CST   (Arrive by 10:15 AM)   Implanted Defibulator with Uc Cv Device 1   [facility] ([facility])    [address] [place] [id number] [phone]            Mar 09, 2018 11:00 AM CST   (Arrive by 10:45 AM)   Ventricular Assist Device with CLAIRE Escobar CNP   [facility] ([facility])    [address] [place] [id number] [phone]              Who to contact     If you have questions or need follow up information about today's clinic visit or your schedule please contact [facility] NEPHROLOGY directly at [phone].  Normal or non-critical lab and imaging results will be communicated to you by Room 77hart, letter or phone within 4 business days after the clinic has received the results. If you do not hear from us within 7 days, please contact the clinic through Room 77hart or phone. If you have a critical or abnormal lab result, we will notify you by phone as soon as possible.  Submit refill requests through Viewpoints or call your pharmacy and they will forward the refill request to us. Please allow 3 business days for your refill to be completed.          Additional Information About Your Visit        Viewpoints Information     Viewpoints lets you send messages to your doctor, view your test results, renew your prescriptions, schedule appointments and more. To sign up, go to [email or web address] . Click on \"Log in\" on the left side of the screen, which will take you to the Welcome page. Then click on \"Sign up Now\" on the right side of the page.     You will be asked to enter the access code listed below, as well as some personal " "information. Please follow the directions to create your username and password.     Your access code is: [email or web address]  Expires: 2017  3:12 PM     Your access code will  in 90 days. If you need help or a new code, please call your [facility] clinic or [phone].        Care EveryWhere ID     This is your Care EveryWhere ID. This could be used by other organizations to access your [facility] medical records  [id number]        Your Vitals Were     Pulse Temperature Respirations Height Pulse Oximetry BMI (Body Mass Index)    62 98.1  F (36.7  C) (Oral) 16 1.727 m (5' 8\") 100% 27.37 kg/m2       Blood Pressure from Last 3 Encounters:   17 90/65   17 96/66   10/11/17 92/64    Weight from Last 3 Encounters:   17 81.6 kg (180 lb)   17 77.1 kg (170 lb)   17 88 kg (194 lb 0.1 oz)              Today, you had the following     No orders found for display         Today's Medication Changes          These changes are accurate as of: 17 11:59 PM.  If you have any questions, ask your nurse or doctor.               These medicines have changed or have updated prescriptions.        Dose/Directions    acetaminophen 325 MG tablet   Commonly known as:  TYLENOL   This may have changed:    - when to take this  - reasons to take this   Used for:  Femoral neck fracture, right, closed, initial encounter        Dose:  650 mg   Take 2 tablets (650 mg) by mouth every 6 hours   Quantity:  100 tablet   Refills:  0       simethicone 80 MG chewable tablet   Commonly known as:  MYLICON   This may have changed:    - when to take this  - reasons to take this   Used for:  Slow transit constipation        Dose:  80 mg   Take 1 tablet (80 mg) by mouth 4 times daily   Quantity:  180 tablet   Refills:  5       warfarin 3 MG tablet   Commonly known as:  COUMADIN   This may have changed:    - how much to take  - how to take this  - when to take this  - additional instructions   Used for:  Cerebrovascular " accident (CVA) due to embolism of right middle cerebral artery (H)        Take 3mgtonight and tomorrow night. Follow up with coumadin clinic on Tuesday for further dosing   Quantity:  180 tablet   Refills:  3                Primary Care Provider Office Phone # Fax #    Thomas Dill [phone]       [address]  [facility] [address]        Equal Access to Services     GIULIANA [facility]. [facility] HospitalBRENDA : Hadii aad ku hadasho Soomaali, waaxda luqadaha, qaybta kaalmada adeegyada, waxay idiin hayaan adeeg kharash la'aan ah. So [facility] [phone].    ATENCIÓN: Si mel villagomez, tiene a smith disposición servicios gratuitos de asistencia lingüística. Llame al [phone].    We comply with applicable federal civil rights laws and [place] laws. We do not discriminate on the basis of race, color, national origin, age, disability, sex, sexual orientation, or gender identity.            Thank you!     Thank you for choosing [facility] NEPHROLOGY  for your care. Our goal is always to provide you with excellent care. Hearing back from our patients is one way we can continue to improve our services. Please take a few minutes to complete the written survey that you may receive in the mail after your visit with us. Thank you!             Your Updated Medication List - Protect others around you: Learn how to safely use, store and throw away your medicines at www.disposemymeds.org.          This list is accurate as of: 11/17/17 11:59 PM.  Always use your most recent med list.                   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    Hypoglycemia       bisacodyl 10 MG Suppository    DULCOLAX    5 suppository    Place 1 suppository (10 mg) rectally daily as needed for constipation    Drug-induced constipation       blood glucose monitoring lancets     1 Box    Use to test blood sugars 2 times daily or as directed.    Diabetes mellitus, type 2 (H)       calcium carbonate-vitamin D 500-400 MG-UNIT Tabs per tablet     90 tablet    Take 1 tablet by mouth daily    Cardiac arrhythmia, unspecified cardiac arrhythmia type       finasteride 5 MG tablet    PROSCAR    90 tablet    Take 1 tablet (5 mg) by mouth daily    Incomplete bladder emptying       furosemide 20 MG tablet    LASIX    20 tablet    Take 1 tablet (20 mg) by mouth as needed    Chronic systolic congestive heart failure (H)       gabapentin 100 MG capsule    NEURONTIN    60 capsule    Take 1 capsule (100 mg) by mouth 2 times daily    Left foot pain       ketotifen 0.025 % Soln ophthalmic solution    ZADITOR/REFRESH ANTI-ITCH    1 Bottle    Place 1 drop into both eyes 2 times daily    Allergic conjunctivitis, bilateral       levETIRAcetam 750 MG tablet    KEPPRA    180 tablet    TAKE 1 TABLET (750 MG) BY MOUTH 2 TIMES DAILY    Convulsions, unspecified convulsion type (H)       loratadine 10 MG tablet    CLARITIN    90 tablet    TAKE 1 TABLET (10 MG) BY MOUTH DAILY    Allergic rhinitis       losartan 25 MG tablet    COZAAR    45 tablet    Take 0.5 tablets (12.5 mg) by mouth daily    CHF (congestive heart failure), NYHA class IV, chronic, systolic (H)       magnesium oxide 400 MG tablet    MAG-OX    60 tablet    Take 1 tablet  (400 mg) by mouth 2 times daily    Abdominal distention, Drug-induced constipation       melatonin 3 MG tablet      Take 1 tablet (3 mg) by mouth At Bedtime    Insomnia, unspecified type       nitroGLYcerin 0.4 MG sublingual tablet    NITROSTAT    30 tablet    Place 1 tablet (0.4 mg) under the tongue every 5 minutes as needed for chest pain    Coronary artery disease involving native coronary artery with unstable angina pectoris (H)       nystatin 929691 UNIT/GM Powd    MYCOSTATIN    60 g    Apply to affected areas of skin in the groin and on the scrotum three times a day as needed.    Intertriginous dermatitis associated with moisture       * order for DME     1 Device    Equipment being ordered: Lift chair.  Please see indications and face-to-face encounter details in 2/3/15 Office Visit note.    Fracture of femoral neck, right, closed, initial encounter, Stroke (H), CHF (congestive heart failure), NYHA class IV (H)       * order for DME     2 each    Equipment being ordered: Bilateral leg supports for manual wheelchair.    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    Cerebrovascular accident (CVA) due to embolism of right middle cerebral artery (H), Closed fracture of neck of right femur with  nonunion, subsequent encounter       * order for DME     1 each    Equipment being ordered: Wheelchair, manual, with elevated leg rests and tilt in space back.  Please fax to [facility]; I called them 11/26/16 and they requested the new order.  Face to face is in my 10/26/16 note.    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  Dx: previous stroke with left hemiplegia.  Duration of need: 99 months.    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    Gastrointestinal hemorrhage associated with chronic superficial gastritis       SENEXON-S 8.6-50 MG per tablet   Generic drug:  senna-docusate     60 tablet    TAKE 1-2 TABLETS BY MOUTH 2 TIMES DAILY AS NEEDED FOR CONSTIPATION    Slow transit constipation       simethicone 80 MG chewable tablet    MYLICON    180 tablet    Take 1 tablet (80 mg) by mouth 4 times daily    Slow transit constipation       tamsulosin 0.4 MG capsule    FLOMAX    90 capsule    TAKE 1 CAPSULE EVERY DAY    Incomplete bladder emptying       thiamine 100 MG tablet     90 tablet    TAKE 1 TABLET (100 MG) BY MOUTH DAILY    Cerebrovascular accident (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 Take 25mg by mouth every evening.        warfarin 3  MG tablet    COUMADIN    180 tablet    Take 3mgtonight and tomorrow night. Follow up with coumadin clinic on Tuesday for further dosing    Cerebrovascular accident (CVA) due to embolism of right middle cerebral artery (H)       * Notice:  This list has 8 medication(s) that are the same as other medications prescribed for you. Read the directions carefully, and ask your doctor or other care provider to review them with you.

## 2017-11-20 NOTE — PROGRESS NOTES
ANTICOAGULATION FOLLOW-UP CLINIC VISIT    Patient Name:  Sohan Rendon  Date:  11/20/2017  Contact Type:  Telephone    SUBJECTIVE:     Patient Findings     Positives No Problem Findings    Comments Recommended patient continue on 4.5mg of Coumadin daily.  Will recheck at home health visit next Monday.           OBJECTIVE    INR   Date Value Ref Range Status   11/20/2017 2.1  Final     Comment:     Home health INR draw     Chromogenic Factor 10   Date Value Ref Range Status   08/10/2014 24 (L) 70 - 130 % Final     Comment:     Therapeutic Range:  A Chromogenic Factor 10 level of approximately 20-40%   inversely correlates with an INR of 2-3 for patients receiving Warfarin.   Chromogenic Factor 10 levels below 20% indicate an INR greater than 3 and   levels above 40% indicate an INR less than 2.       Factor 2 Assay   Date Value Ref Range Status   07/21/2014 25 (L) 60 - 140 % Final     Comment:     Analyte Specific Reagents (ASRs) are used in many laboratory tests necessary   for   standard medical care and generally do not require FDA approval.  This test   was   developed and its performance characteristics determined by [facility].  It has not been cleared or approved by   the US Food and Drug Administration.         ASSESSMENT / PLAN  INR assessment THER    Recheck INR In: 1 WEEK    INR Location Homecare INR      Anticoagulation Summary as of 11/20/2017     INR goal    Prior goal 1.8-2.5   Today's INR 2.1   Maintenance plan 3 mg (3 mg x 1) on Mon; 4.5 mg (3 mg x 1.5) all other days   Full instructions 3 mg on Mon; 4.5 mg all other days   Weekly total 30 mg   No change documented James Nickerson RN   Plan last modified Blanca Bah RN (10/23/2017)   Next INR check 11/27/2017   Priority INR   Target end date Indefinite    Indications   LVAD (left ventricular assist device) present [Z95.811]  Long-term (current) use of anticoagulants [Z79.01] [Z79.01]          Anticoagulation Episode Summary     INR check location     Preferred lab     Send INR reminders to  [facility]    Comments Goal Range is 1.8-2.3  [facility] comes out to draw INR  Sofya Ph [phone]  Daughter Almaz Ph [phone]      Anticoagulation Care Providers     Provider Role Specialty Phone number    Evon Lemons MD Responsible Cardiology [phone]            See the Encounter Report to view Anticoagulation Flowsheet and Dosing Calendar (Go to Encounters tab in chart review, and find the Anticoagulation Therapy Visit)    Spoke with home health nurse. Gave them their lab results and new warfarin recommendation.  No changes in health, medication, or diet. No missed doses, no falls. No signs or symptoms of bleed or clotting.    James Nickerson, RN

## 2017-11-20 NOTE — MR AVS SNAPSHOT
Sohan [facility]   11/20/2017   Anticoagulation Therapy Visit    Description:  67 year old male   Provider:  James Nickerson RN   Department:  [facility]           INR as of 11/20/2017     Today's INR 2.1      Anticoagulation Summary as of 11/20/2017     INR goal    Prior goal 1.8-2.5   Today's INR 2.1   Full instructions 3 mg on Mon; 4.5 mg all other days   Next INR check 11/27/2017    Indications   LVAD (left ventricular assist device) present [Z95.811]  Long-term (current) use of anticoagulants [Z79.01] [Z79.01]         November 2017 Details    Sun Mon Tue Wed Thu Fri Sat        1               2               3               4                 5               6               7               8               9               10               11                 12               13               14               15               16               17               18                 19               20      3 mg   See details      21      4.5 mg         22      4.5 mg         23      4.5 mg         24      4.5 mg         25      4.5 mg           26      4.5 mg         27            28               29               30                  Date Details   11/20 This INR check       Date of next INR:  11/27/2017         How to take your warfarin dose     To take:  3 mg Take 1 of the 3 mg tablets.    To take:  4.5 mg Take 1.5 of the 3 mg tablets.

## 2017-11-20 NOTE — PROGRESS NOTES
I communicated with daughter, Antionette, that Sohan could continue to be seen in the home by a [facility] provider.   I let her know we would see him in January and would reach out to get that appointment scheduled.  [facility] Administrator

## 2017-11-20 NOTE — PROGRESS NOTES
11/20 Sofya phoned the Coumadin clinic with an update on Mr. Rendon.  He has a history of tea colored urine.  Reported u/a had significant amount of blood in specimen.  Tea colored urine reported today.  Did not make changes in recommendations.

## 2017-11-21 NOTE — TELEPHONE ENCOUNTER
Pt's dtr called to report hematuria since yesterday. She reported his urine was tea colored for a couple of days and it turned red yesterday afternoon.  Antionette stated there have not been any clots in pt's urine.  The pt reported feeling fine; not dizzy or lightheaded and not lethargic or fatigued.    During this phone call, Antionette witnessed the pt urinating and she stated his urine was back to normal color. No blood.    Last INR was 2.1 on 11/20/17. Goal is 1.8 - 2.5.  Next INR due to be drawn on 11/27.    The pt's other daughter contacted the LVAD clinic to discuss hematuria with them as well.  I advised to monitor the pt and his urine and call back if they notice bright red urine again or any worrying signs or symptoms above.     Routing to Dr. Dill for his information and to advise if any orders or recommendations.    Ekaterina Nielson RN

## 2017-11-22 PROBLEM — R31.9 HEMATURIA: Status: ACTIVE | Noted: 2017-01-01

## 2017-11-22 NOTE — PROGRESS NOTES
[facility]  Patient is currently open to home care services with [facility].  The patient is currently receiving RN/Palliative services.  [facility]  and team have been notified of patient admission.  [facility] liaison will continue to follow patient during stay.  If appropriate provide orders to resume home care at time of discharge.    Thank you  Melissa Sifuentes RN, BSN  [facility] Liaison  [phone]

## 2017-11-22 NOTE — IP AVS SNAPSHOT
` `     [address] [facility] BANK: [phone]            Medication Administration Report for Sohan Rendon as of 11/26/17 1551   Legend:    Given Hold Not Given Due Canceled Entry Other Actions    Time Time (Time) Time  Time-Action       Inactive    Active    Linked        Medications 11/20/17 11/21/17 11/22/17 11/23/17 11/24/17 11/25/17 11/26/17    acetaminophen (TYLENOL) tablet 650 mg  Dose: 650 mg Freq: EVERY 6 HOURS Route: PO  Start: 11/22/17 2245   Admin Instructions: Maximum acetaminophen dose from all sources = 75 mg/kg/day not to exceed 4 grams/day.       2319 (650 mg)-Given        0937 (650 mg)-Given       (1458)-Not Given       2000 (650 mg)-Given        (0347)-Not Given       0802 (650 mg)-Given       (1341)-Not Given       2006 (650 mg)-Given        (0331)-Not Given       0851 (650 mg)-Given       1321 (650 mg)-Given       2011 (650 mg)-Given        0303 (650 mg)-Given       0852 (650 mg)-Given       1510 (650 mg)-Given       [ ] 2000           bisacodyl (DULCOLAX) Suppository 10 mg  Dose: 10 mg Freq: DAILY PRN Route: RE  PRN Reason: constipation  Start: 11/24/17 0829        1539 (10 mg)-Given             Continuing ACE inhibitor/ARB/ARNI from home medication list OR ACE inhibitor/ARB/ARNI order already placed during this visit  Freq: DOES NOT GO TO MAR Route: XX  PRN Reason: other  Start: 11/22/17 2235   Admin Instructions: Continuing ACE inhibitor/ARB/ARNI from home medication list OR ACE inhibitor/ARB/ARNI order already placed during this visit               Continuing beta blocker from home medication list OR beta blocker order already placed during this visit  Freq: DOES NOT GO TO MAR Route: XX  PRN Reason: other  Start: 11/22/17 2235   Admin Instructions: Continuing beta blocker from home medication list OR beta blocker order already placed during this visit               finasteride (PROSCAR) tablet 5 mg  Dose: 5 mg Freq: DAILY Route: PO  Start: 11/23/17 0800   Admin Instructions: *Do not handle  "tablets if you are pregnant*        0937 (5 mg)-Given        0803 (5 mg)-Given        0852 (5 mg)-Given        0852 (5 mg)-Given           gabapentin (NEURONTIN) capsule 100 mg  Dose: 100 mg Freq: 2 TIMES DAILY Route: PO  Start: 11/22/17 2245      2319 (100 mg)-Given        0937 (100 mg)-Given       (2001)-Not Given        0803 (100 mg)-Given       (2006)-Not Given        (0852)-Not Given       (2011)-Not Given        (0852)-Not Given       [ ] 2000           ketotifen (ZADITOR/REFRESH ANTI-ITCH) 0.025 % ophthalmic solution 1 drop  Dose: 1 drop Freq: 2 TIMES DAILY Route: Both Eyes  Start: 11/22/17 2245       0001 (1 drop)-Given       0920 (1 drop)-Given       (2220)-Not Given        0803 (1 drop)-Given       2009 (1 drop)-Given        0852 (1 drop)-Given       2012 (1 drop)-Given        0852 (1 drop)-Given       [ ] 2000           levETIRAcetam (KEPPRA) tablet 750 mg  Dose: 750 mg Freq: 2 TIMES DAILY Route: PO  Start: 11/22/17 2245       0001 (750 mg)-Given       1018 (750 mg)-Given       2000 (750 mg)-Given        0802 (750 mg)-Given       2009 (750 mg)-Given        0852 (750 mg)-Given       2012 (750 mg)-Given        0852 (750 mg)-Given       [ ] 2000           lidocaine (LMX4) kit  Freq: EVERY 1 HOUR PRN Route: Top  PRN Reason: pain  PRN Comment: with VAD insertion or accessing implanted port.  Start: 11/22/17 2235   Admin Instructions: Do NOT give if patient has a history of allergy to any local anesthetic or any \"sara\" product.   Apply 30 minutes prior to VAD insertion or port access.  MAX Dose:  2.5 g (  of 5 g tube)               lidocaine 1 % 1 mL  Dose: 1 mL Freq: EVERY 1 HOUR PRN Route: OTHER  PRN Comment: mild pain with VAD insertion or accessing implanted port  Start: 11/22/17 2235   Admin Instructions: Do NOT give if patient has a history of allergy to any local anesthetic or any \"sara\" product. MAX dose 1 mL subcutaneous OR intradermal in divided doses.               melatonin tablet 3 mg  Dose: 3 mg " Freq: AT BEDTIME Route: PO  Start: 11/22/17 2245      2319 (3 mg)-Given         (0347)-Not Given       (2235)-Not Given       2251 (3 mg)-Given        2220 (3 mg)-Given        [ ] 2200           metoprolol (TOPROL-XL) 24 hr tablet 25 mg  Dose: 25 mg Freq: AT BEDTIME Route: PO  Start: 11/25/17 2200   Admin Instructions: DO NOT CRUSH. Tablet may be split in half along score line.          2218 (25 mg)-Given        [ ] 2200           metoprolol (TOPROL-XL) 24 hr tablet 50 mg  Dose: 50 mg Freq: EVERY MORNING Route: PO  Start: 11/26/17 0800          0852 (50 mg)-Given           naloxone (NARCAN) injection 0.1-0.4 mg  Dose: 0.1-0.4 mg Freq: EVERY 2 MIN PRN Route: IV  PRN Reason: opioid reversal  Start: 11/22/17 2235   Admin Instructions: For respiratory rate LESS than or EQUAL to 8.  Partial reversal dose:  0.1 mg titrated q 2 minutes for Analgesia Side Effects Monitoring Sedation Level of 3 (frequently drowsy, arousable, drifts to sleep during conversation).Full reversal dose:  0.4 mg bolus for Analgesia Side Effects Monitoring Sedation Level of 4 (somnolent, minimal or no response to stimulation).  Give IV Push undiluted up to 2mg. Give each 0.4mg over 15 seconds in emergency situations. For non-emergent situations further dilute in 9mL of NS to facilitate titration of response.               oxyCODONE IR (ROXICODONE) tablet 5 mg  Dose: 5 mg Freq: EVERY 4 HOURS PRN Route: PO  PRN Reason: moderate to severe pain  Start: 11/22/17 2235              Patient is already receiving anticoagulation with heparin, enoxaparin (LOVENOX), warfarin (COUMADIN)  or other anticoagulant medication  Freq: CONTINUOUS PRN Route: XX  Start: 11/22/17 2235              polyethylene glycol (MIRALAX/GLYCOLAX) Packet 17 g  Dose: 17 g Freq: ONCE Route: PO  Start: 11/24/17 0830   Admin Instructions: 1 Packet = 17 grams. Mixed prescribed dose in 8 ounces of water. Follow with 8 oz. of water.         (1530)-Not Given             senna-docusate  (SENOKOT-S;PERICOLACE) 8.6-50 MG per tablet 1 tablet  Dose: 1 tablet Freq: AT BEDTIME Route: PO  Start: 11/24/17 2200   Admin Instructions: Hold for loose stools.         2010 (1 tablet)-Given [C]        (2219)-Not Given [C]        [ ] 2200           sodium chloride (PF) 0.9% PF flush 3 mL  Dose: 3 mL Freq: EVERY 8 HOURS Route: IK  Start: 11/22/17 2245   Admin Instructions: And Q1H PRN, to lock peripheral IV dormant line.       2352 (3 mL)-Given        0938 (3 mL)-Given       (1617)-Not Given        (0142)-Not Given       (0804)-Not Given       1527 (3 mL)-Given        (0243)-Not Given       0852 (3 mL)-Given       1627 (3 mL)-Given       (2327)-Not Given        0852 (3 mL)-Given       [ ] 1600           sodium chloride (PF) 0.9% PF flush 3 mL  Dose: 3 mL Freq: EVERY 1 HOUR PRN Route: IK  PRN Reason: line flush  PRN Comment: for peripheral IV flush post IV meds  Start: 11/22/17 2235              tamsulosin (FLOMAX) capsule 0.4 mg  Dose: 0.4 mg Freq: DAILY Route: PO  Start: 11/23/17 0800   Admin Instructions: Administer 30 minutes after the same meal each day.  Capsules should be swallowed whole; do not crush chew or open.        0937 (0.4 mg)-Given        0802 (0.4 mg)-Given        0852 (0.4 mg)-Given        0852 (0.4 mg)-Given           Warfarin Therapy Reminder (Check START DATE - warfarin may be starting in the FUTURE)  Freq: CONTINUOUS PRN Route: XX  Start: 11/24/17 1411   Admin Instructions: *Note to reorder warfarin daily*  Pharmacy Warfarin Dosing Service  Patient is on Warfarin Therapy - check for daily order              Future Medications  Medications 11/20/17 11/21/17 11/22/17 11/23/17 11/24/17 11/25/17 11/26/17       warfarin (COUMADIN) half-tab 4.5 mg  Dose: 4.5 mg Freq: ONCE AT 6PM Route: PO  Start: 11/26/17 1800          [ ] 1800          Completed Medications  Medications 11/20/17 11/21/17 11/22/17 11/23/17 11/24/17 11/25/17 11/26/17         Dose: 500 mL Freq: ONCE Route: IV  Last Dose: 500 mL  (11/24/17 1527)  Start: 11/24/17 1430   End: 11/24/17 1927        1527 (500 mL)-New Bag               Dose: 10 g Freq: ONCE Route: PO  Start: 11/25/17 1300   End: 11/25/17 1444         1444 (10 g)-Given              Dose: 4.5 mg Freq: ONCE AT 6PM Route: PO  Start: 11/25/17 1800   End: 11/25/17 1813         1813 (4.5 mg)-Given              Dose: 4 mg Freq: ONCE AT 6PM Route: PO  Start: 11/24/17 1800   End: 11/24/17 1746        1746 (4 mg)-Given            Discontinued Medications  Medications 11/20/17 11/21/17 11/22/17 11/23/17 11/24/17 11/25/17 11/26/17         Dose: 100 mg Freq: DAILY Route: PO  Start: 11/23/17 0800   End: 11/25/17 1413       0937 (100 mg)-Given        0802 (100 mg)-Given        0852 (100 mg)-Given       1413-Med Discontinued          Dose: 10 mg Freq: DAILY Route: PO  Start: 11/23/17 0800   End: 11/25/17 1413       0937 (10 mg)-Given        0802 (10 mg)-Given        0851 (10 mg)-Given       1413-Med Discontinued          Dose: 10 mg Freq: DAILY PRN Route: RE  PRN Reason: constipation  Start: 11/22/17 2235   End: 11/24/17 0835        0835-Med Discontinued           Rate: 0-35 mL/hr Freq: CONTINUOUS Route: IV  Last Dose: Stopped (11/24/17 1526)  Start: 11/23/17 1030   End: 11/24/17 1415   Admin Instructions: Starting infusion Rate = 1,000 Units/hr (actual weight) (12 units/kg/hr).  Low Intensity Heparin Treatment.  GOAL: Heparin Xa (10a) LEVEL = 0.15-0.35 (PTT = 45-65 seconds).  Max 1000 units/hr if patient getting TNK and greater than 70 kg.  Beginning with the Heparin Xa (10a) Level collected 6 hours after starting heparin, adjust heparin according to guidelines.    Release Heparin Xa (10a) Level order for blood draw 6 hours after start of infusion and 6 hours after any dose change.    If Xa (10a) less than 0.1 see bolus orders and INCREASE by 300 units/hr     If Xa (10a) 0.1 - 0.14 see bolus orders and INCREASE by 150 units/hr     If Xa (10a) 0.15 - 0.35 then  NO CHANGE in rate    if Xa (10a)  0.36 - 0.48 then REDUCE by 150 units/hr     If Xa (10a) 0.49 - 0.66 then HOLD 30 mins and REDUCE by 200 units/hr     If Xa (10a) 0.67 - 1.31 then HOLD 60 mins and REDUCE by 300 units/hr     If Xa (10a) greater than 1.31 then HOLD 60 mins and REDUCE by 350 units/hr        1055 (1,000 Units/hr)-Restarted       1540 (1,000 Units/hr)-Rate/Dose Verify        0130 (1,000 Units/hr)-New Bag       0804 (1,000 Units/hr)-Rate/Dose Verify       1044 (850 Units/hr)-Rate/Dose Change [C]       1341 (850 Units/hr)-Rate/Dose Verify       1415-Med Discontinued  1526-Stopped               Freq: EVERY 6 HOURS PRN Route: IV  PRN Comment: to reach target Heparin Xa (10a) goal. GOAL: Heparin Xa (10a) LEVEL = 0.15-0.35 (PTT = 45-65 seconds).  Start: 11/23/17 1025   End: 11/24/17 1415   Admin Instructions: Beginning with the Heparin Xa (10a) level collected 6 hours AFTER starting heparin:  If Heparin Xa (10a) less than 0.1, then bolus dose = 4,200 Units (actual weight) (50 Units/kg x 84 kg (actual weight))    If Heparin Xa (10a) 0.1 to 0.14, then bolus dose = 2,500 Units (actual weight) (30 Units/kg x 84 kg (actual weight)).  Nurse to administer dose from existing infusion. If no infusion bag or syringe for this order is available, contact pharmacist to re-enter medication order.         1415-Med Discontinued           Dose: 50 mg Freq: 2 TIMES DAILY Route: PO  Start: 11/22/17 2245   End: 11/25/17 1349      2319 (50 mg)-Given        0937 (50 mg)-Given       2000 (50 mg)-Given        0802 (50 mg)-Given       2006 (50 mg)-Given        0852 (50 mg)-Given       1349-Med Discontinued          Dose: 20 mg Freq: EVERY MORNING Route: PO  Start: 11/23/17 0800   End: 11/25/17 1413   Admin Instructions: DO NOT CRUSH.        0937 (20 mg)-Given        0903 (20 mg)-Given        0852 (20 mg)-Given       1413-Med Discontinued     Medications 11/20/17 11/21/17 11/22/17 11/23/17 11/24/17 11/25/17 11/26/17

## 2017-11-22 NOTE — ED NOTES
Bed: ED15  Expected date:   Expected time:   Means of arrival:   Comments:  Sohan Rendon (MRN [id number])  Being referred by the LVAD coordinator for evaluation of 2 day history of blood in urine.  Now has abdominal pain.

## 2017-11-22 NOTE — ED PROVIDER NOTES
History     Chief Complaint   Patient presents with     Hematuria     HPI  Sohan Rendon is a 67 year old male who presents for hematuria and abdominal pain. History provided by daughter though they are difficult historians. They endorse 4 day of hematuria. This has reportedly happened before. He was seen by Urology and had a cysotoscopy and was treated for a UTI. Pt with hx of BPH sp TURP. They are more concerned about patients abdominal pain. Endorses intermittent RUQ and epigastric pain states this is similar to when he had a problem with his gallbaldder in may. No nausea or vomiting. The pain is near the drive line site.    I have reviewed the Medications, Allergies, Past Medical and Surgical History, and Social History in the Epic system.    Review of Systems   Constitutional: Negative for fever.   Respiratory: Negative.    Genitourinary: Positive for hematuria.   All other systems reviewed and are negative.      Physical Exam   BP: 113/89  Pulse: 66  Heart Rate: 62  Temp: 98.2  F (36.8  C)  Resp: 16  SpO2: 98 %      Physical Exam   Constitutional: He is oriented to person, place, and time. He appears well-developed and well-nourished.   HENT:   Head: Atraumatic.   Right Ear: External ear normal.   Left Ear: External ear normal.   Eyes: Conjunctivae and EOM are normal. No scleral icterus.   Neck: Normal range of motion. Neck supple.   Cardiovascular: Normal rate and regular rhythm.    Pulmonary/Chest: Effort normal. No respiratory distress.   Abdominal: Soft. There is no tenderness. There is no rebound and no guarding.   Drive line site CDI patient with no reproducible TTP on exam   Musculoskeletal: Normal range of motion. He exhibits no edema or tenderness.   Neurological: He is alert and oriented to person, place, and time.   Skin: Skin is warm and dry. No rash noted.   Psychiatric: He has a normal mood and affect. His behavior is normal.   Nursing note and vitals reviewed.      ED Course     ED Course      Procedures    Labs Ordered and Resulted from Time of ED Arrival Up to the Time of Departure from the ED   INR - Abnormal; Notable for the following:        Result Value    INR 2.45 (*)     All other components within normal limits   ROUTINE UA WITH MICROSCOPIC - Abnormal; Notable for the following:     Blood Urine Large (*)     Protein Albumin Urine 100 (*)     WBC Urine 8 (*)     RBC Urine 4 (*)     Bacteria Urine Few (*)     All other components within normal limits   COMPREHENSIVE METABOLIC PANEL - Abnormal; Notable for the following:     Glucose 148 (*)     Creatinine 2.24 (*)     GFR Estimate 29 (*)     GFR Estimate If Black 36 (*)     Albumin 3.2 (*)     All other components within normal limits   CBC WITH PLATELETS DIFFERENTIAL - Abnormal; Notable for the following:     RBC Count 3.70 (*)     Hemoglobin 10.7 (*)     Hematocrit 34.4 (*)     MCHC 31.1 (*)     RDW 19.6 (*)     All other components within normal limits   HEMOGLOBIN PLASMA - Abnormal; Notable for the following:     Hemoglobin Plasma 370 (*)     All other components within normal limits   CBC WITH PLATELETS DIFFERENTIAL   LIPASE   TROPONIN I   LACTATE DEHYDROGENASE   LACTATE DEHYDROGENASE   URINE CULTURE AEROBIC BACTERIAL            Assessments & Plan (with Medical Decision Making)   67 year old here with hematuria. Patient arrives hemodynamically stable but his urine is clearly tea colored. UA with large blood and few WBC/RBCs. LDH is unable to be obtained x 2 (due to hemolysis) and plasma hemoglobin elevated to 370. Hgb stable at 10.7. LVAD coordinator had patient monitored the entire ED visit with good flows noted. Spoke with Dr. Phipps who would like the patient to be admitted for hemolysis.     I have reviewed the nursing notes.    I have reviewed the findings, diagnosis, plan and need for follow up with the patient.    New Prescriptions    No medications on file       Final diagnoses:   LVAD (left ventricular assist device) present (H)        11/22/2017   [facility], [place], EMERGENCY DEPARTMENT     Anita Cano MD  11/22/17 6285

## 2017-11-22 NOTE — PROGRESS NOTES
Indication of Interrogation:  Hematuria     Type of VAD:  Heartmate 2    Current Parameters:  Flow= 3.9 lpm, Speed= 8800 rpm, Power= 5.2 capps, PI (if applicable)= 4.6    Abnormal Alarm on History:  No    Abnormal Events/Parameters Notes:  Yes, explain: frequent PI events    Changes Made during Interrogation:  No    I personally provided care for this patient, reviewed chart, discussed course with patient, housestaff and consulting physicians.  I answered all questions.    Spencer Phipps M.D.  Division of Cardiology  Department of Medicine

## 2017-11-22 NOTE — IP AVS SNAPSHOT
[address] [address]    Phone:  [phone]                                       After Visit Summary   11/22/2017    Sohan Rendon    MRN: [id number]           After Visit Summary Signature Page     I have received my discharge instructions, and my questions have been answered. I have discussed any challenges I see with this plan with the nurse or doctor.    ..........................................................................................................................................  Patient/Patient Representative Signature      ..........................................................................................................................................  Patient Representative Print Name and Relationship to Patient    ..................................................               ................................................  Date                                            Time    ..........................................................................................................................................  Reviewed by Signature/Title    ...................................................              ..............................................  Date                                                            Time

## 2017-11-22 NOTE — TELEPHONE ENCOUNTER
Information noted.  Gross hematuria has been an ongoing problem, and is the reason that ASA was stopped in the past and that INR target was reduced to 1.8 - 2.3.  Also noted that daughters called both our clinic as well as Cardiology, as they have done in the past.  At past visits, I have tried to remind daughters that patient as a Urologist, Dr. Meier, who patient has seen as recently as 6/22/17 for hematuria.  Exactly why the daughters don't contact Urology when hematuria recurs is unclear to me.    Since hematuria has again resolved, there is no action required.  If hematuria recurs, daughters should let Urology know.    Thanks.

## 2017-11-23 NOTE — PLAN OF CARE
Focus:  Admission  Diagnosis:   Admitted from: [facility]   Via: stretcher   Accompanied by: son and daughter  Belongings: LVAD batteries and backup controller at bedside, no valuables to place in safe  Admission paperwork: complete.   Teaching: Call don't fall, use of console, meal times, visiting hours, orientation to unit, when to call for the RN (angina/sob/dizzyness, etc.), and stressed the importance of safety.   Access: PIV   Telemetry: Placed on pt   Ht./Wt.: complete

## 2017-11-23 NOTE — H&P
[facility] Cardiology History and Physical    Sohan Rendon MRN# [id number]   Age: 67 year old YOB: 1950     Date of Admission:  11/22/2017    Primary care provider: Thomas Dill          Assessment and Plan:   Sohan Rendon is a 67 year old male with PMHx significant for severe ICM s/p HM II LVAD as DT c/b recurrent device thrombi, recurrent hemolysis and multiple embolic CVAs, recurrent GI/urogenital bleeding (goal INR 1.8 - 2.5), CKD Stage III, HTN, latent TB, and recurrent UTIs  who presented hemoglobinuria and abdominal pain.    #Hemoglobinuria   Concerning for intravascular hemolysis from pump thrombosis. Patient has known recurrent pump thrombosis in the past. Current episode span a period of 4 days. INR has been subtherapeutic in the recent past. No infection, no arrhythmia. Pump power and index intact. Plasma hemoglobin elevated. LDH unsatisfactory. Haptoglobin pending. Hb and platelets stable. Overall, patient's picture concerning for intravascular hemolysis in the setting of recurrent pump thrombosis. Will initiate low intensity HEP.  -start HEP gtt, hold warfarin given bleeding risks  -TTE pending    #Abdominal pain  Spontaneously resolved by the time of admission after a BM. Patient does have a recent history of acalculous cholecystitis s/p stent. History, exam, labs, and CT are not suggestive of this as a cause of the patient's abdominal pain. Other etiologies include PUD and gastritis. Will monitor for recurrence.    #ICM s/p HM II LVAD   Complicated by recurrent device thrombi, recurrent hemolysis and multiple embolic CVAs, recurrent GI/urogenital bleeding. Patient's VAD settings are as follow: Speed: 8800 RPMs,     PI: 4.5 ,  Power: 5 Capps. Current parameters are Flow= 3.9 lpm, Speed= 8800 rpm, Power= 5.2 capps, PI (if applicable)= 4.6.  -- Fluid status: euvolemic   -- ACEi/ARB: Continue lisinopril  -- BB: Continue metoprolol succinate 50 mg qAM and 25mg qHS  --  Aldosterone antagonist: none  -- Anticoagulation: HEP gtt  -- Antiplatelet: none  -- Statin: Continue atorvastatin 10 mg qday    Chronic Problems  # CKD Stage III: Cr at baseline ~1.7-2.1, 1.87 on admission, will continue to monitor  # HTN: continue PTA medications as above  # Chronic Normocytic Anemia: no active signs of bleeding, will continue to monitor  # Seizures s/p Multiple CVAs: continue PTA keppra 750 mg BID  # BPH: continue PTA tamsulosin and finasteride  # HLD: continue PTA atorvastatin  # Gout: no signs of active flare, continue PTA allopurinol  # GERD: continue PTA pantoprazole  # Insomnia: continue PTA melatonin  # Chronic Pain Syndrome: continue PTA oxycodone PRN    FEN: cardiac diet  Prophylaxis: on HEP  Code Status: FULL  Disposition: I anticipate that patient will require care spanning more than 2 midnights due to further workup and treatment    Patient to be formally staffed in AM.    Jose Roberto Tierney MD  Internal Medicine PGY-2  [phone]          Chief Complaint:   Dark urine         History of Present Illness:   Sohan Rendon is a 67 year old male with PMHx significant for severe ICM s/p HM II LVAD as DT c/b recurrent device thrombi, recurrent hemolysis and multiple embolic CVAs, recurrent GI/urogenital bleeding (goal INR 1.8 - 2.5), CKD Stage III, HTN, latent TB, and recurrent UTIs  who presented hemoglobinuria and abdominal pain.    Patient is a poor historian and speaks Jamaican. History gathered with daughter in room. Patient reported dark urine x4 days. No associated symptoms including dysuria or weaker stream or polyuria. Patient has been hospitalized in 5/17 for hematuria and found to have sepsis 2/2 UTI at that time. He has seen urology s/p cystoscopy 6/2/16  with red lesions noted, negative cytology.     Patient also endorsed abdominal pain. Pain is epigastric to RUQ, sharp in character, not associated with eating, constant, lasting for the past day. By the time I saw this patient, his  abdominal pain had resolved after a BM. Patient endorsed similar pain 2 months ago that self-resolved.         Review of Systems:   10 point ROS negative unless noted above         Past Medical History:     Last Echocardiogram 8/26/17  Interpretation Summary  Limited LVAD study.  The LV appears aggressively decompressed. The inflow cannula is in close  proximity with the septum, more prominently than on study 8/9/2017.  The RV is normal in size or only marginally dilated. RV function is low  normal.  The IVC is small.    Medical History reviewed.   Past Medical History:   Diagnosis Date     Acute right MCA stroke (H) 6/2013    With L sided hemiparesis      Anemia of chronic disease     Baseline Hb 8-9     BPH (benign prostatic hyperplasia)      CHF (congestive heart failure), NYHA class IV (H) 10/9/2012    s/p HeartMate II.  Was on milrinone and dobutamine prior to LVAD      CKD (chronic kidney disease)     Baseline Cr=     Clostridium difficile colitis 12/2012      Dysphagia     PEG tube placed in 2012.  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  Normal carotid duplex 3/2014.       Esophagitis 12/2012    EGD with esophagitis and multiple douenal ulcers     Fracture of femoral neck, right, closed (H) 2/3/2015    Presumed pathologic as patient is non-weight-bearing and suffered no trauma.  Family declined operative repair during hospital stay 1/23 - 1/30/15.     Gastric and duodenal angiodysplasia with hemorrhage 6/18/2015     GERD (gastroesophageal reflux disease)      Gout      Hematuria      HTN (hypertension)     LDL=59 (3/29/2014)     Hyperlipaemia      Ischemic cardiomyopathy      Mitral regurgitation     s/p MVR with Carbomedics ring      Myocardial infarction 1998    In [facility] without intervention      Nonsenile cataract     BE     PFO (patent foramen ovale)     s/p closure (10/9/2012)     TB lung, latent     s/p  9 months INH in 2012              Past Surgical History:   Surgical History reviewed.   Past Surgical History:   Procedure Laterality Date     C CABG, VEIN, FIVE  2001     CATARACT IOL, RT/LT Right 11/19/2015     ENDOSCOPIC RETROGRADE CHOLANGIOPANCREATOGRAM N/A 5/9/2017    Procedure: COMBINED ENDOSCOPIC RETROGRADE CHOLANGIOPANCREATOGRAPHY, PLACE TUBE/STENT;  Endoscopic Retrograde Cholangiopancreatogram, Stent (Zimmon Biliary 7fr 12cm) Placement;  Surgeon: Cristo Grove MD;  Location: [facility] OR     ESOPHAGOSCOPY, GASTROSCOPY, DUODENOSCOPY (EGD), COMBINED N/A 6/10/2015    Procedure: COMBINED ESOPHAGOSCOPY, GASTROSCOPY, DUODENOSCOPY (EGD);  Surgeon: Edu Jenkins MD;  Location: [facility] GI     ESOPHAGOSCOPY, GASTROSCOPY, DUODENOSCOPY (EGD), COMBINED N/A 6/15/2015    Procedure: COMBINED ESOPHAGOSCOPY, GASTROSCOPY, DUODENOSCOPY (EGD);  Surgeon: Yury Bonner MD;  Location: [facility] OR     H INSERT ICD  2/10/2011    And pacemaker.  Not BiV     INSERT VENTRICULAR ASSIST DEVICE LEFT (HEARTMATE II)  10/9/2012     IR GASTRO JEJUNOSTOMY TUBE PLACEMENT       PHACOEMULSIFICATION CLEAR CORNEA WITH STANDARD INTRAOCULAR LENS IMPLANT Right 11/19/2015    Procedure: PHACOEMULSIFICATION CLEAR CORNEA WITH STANDARD INTRAOCULAR LENS IMPLANT;  Surgeon: Violeta Cosme MD;  Location: [facility] OR     REPAIR PATENT FORAMEN OVALE  10/9/2012     REPAIR VALVE MITRAL  2/9/2012    28 mm Carbomedics 2/3 ring              Social History:   Social History reviewed.   Social History   Substance Use Topics     Smoking status: Former Smoker     Smokeless tobacco: Former User     Alcohol use No             Family History:   Family History reviewed.  Family History   Problem Relation Age of Onset     Family History Negative No family hx of      Glaucoma No family hx of      Macular Degeneration No family hx of      CANCER No family hx of      no skin cancer             Allergies:     Allergies   Allergen Reactions     Seasonal Allergies               Medications:   Medications Reviewed.   No current facility-administered medications for this encounter.      Current Outpatient Prescriptions   Medication Sig     magnesium oxide (MAG-OX) 400 MG tablet Take 1 tablet (400 mg) by mouth 2 times daily     pantoprazole (PROTONIX) 20 MG EC tablet Take by mouth 30-60 minutes before a meal.     ALEK CONTOUR test strip USE TO TEST BLOOD SUGAR 2 TIMES DAILY OR AS DIRECTED.     nystatin (MYCOSTATIN) 742731 UNIT/GM POWD Apply to affected areas of skin in the groin and on the scrotum three times a day as needed.     gabapentin (NEURONTIN) 100 MG capsule Take 1 capsule (100 mg) by mouth 2 times daily     loratadine (CLARITIN) 10 MG tablet TAKE 1 TABLET (10 MG) BY MOUTH DAILY     order for DME Bilateral heel protective cushioning boots.  Dx: previous stroke with left hemiplegia.  Duration of need: 99 months.     Metoprolol Succinate (TOPROL XL PO) Take 50mg by mouth every morning. Take 25mg by mouth every evening.     losartan (COZAAR) 25 MG tablet Take 0.5 tablets (12.5 mg) by mouth daily     levETIRAcetam (KEPPRA) 750 MG tablet TAKE 1 TABLET (750 MG) BY MOUTH 2 TIMES DAILY     tamsulosin (FLOMAX) 0.4 MG capsule TAKE 1 CAPSULE EVERY DAY     furosemide (LASIX) 20 MG tablet Take 1 tablet (20 mg) by mouth as needed     ketotifen (ZADITOR/REFRESH ANTI-ITCH) 0.025 % SOLN ophthalmic solution Place 1 drop into both eyes 2 times daily     SENEXON-S 8.6-50 MG per tablet TAKE 1-2 TABLETS BY MOUTH 2 TIMES DAILY AS NEEDED FOR CONSTIPATION     bisacodyl (DULCOLAX) 10 MG Suppository Place 1 suppository (10 mg) rectally daily as needed for constipation     finasteride (PROSCAR) 5 MG tablet Take 1 tablet (5 mg) by mouth daily     atorvastatin (LIPITOR) 10 MG tablet TAKE 1 TABLET (10 MG) BY MOUTH DAILY     calcium carbonate-vitamin D 500-400 MG-UNIT TABS per tablet Take 1 tablet by mouth daily     simethicone (MYLICON) 80 MG chewable tablet Take 1 tablet (80 mg) by mouth 4 times daily (Patient  taking differently: Take 80 mg by mouth as needed )     oxyCODONE (ROXICODONE) 5 MG IR tablet Take 1 tablet (5 mg) by mouth every 4 hours as needed for moderate to severe pain     melatonin 3 MG tablet Take 1 tablet (3 mg) by mouth At Bedtime     Thiamine HCl (VITAMIN B-1) 100 MG TABS TAKE 1 TABLET (100 MG) BY MOUTH DAILY     order for DME Equipment being ordered: Cushioned heel boots.     allopurinol (ZYLOPRIM) 100 MG tablet Take 1 tablet (100 mg) by mouth daily     order for DME Equipment being ordered: Wheelchair, manual, with elevated leg rests and tilt in space back.  Please fax to [employer]; I called them 11/26/16 and they requested the new order.  Face to face is in my 10/26/16 note.     order for DME Equipment being ordered: Wheelchair, manual.     order for DME Equipment being ordered: Hospital Bed, fully electric.     order for DME Equipment being ordered: Reclining manual w/c with bilateral elevating leg rests.     order for DME Equipment being ordered: Bilateral leg supports for manual wheelchair.     nitroglycerin (NITROSTAT) 0.4 MG SL tablet Place 1 tablet (0.4 mg) under the tongue every 5 minutes as needed for chest pain     blood glucose monitoring (NO BRAND SPECIFIED) lancets Use to test blood sugars 2 times daily or as directed.     ORDER FOR DME Equipment being ordered: Lift chair.    Please see indications and face-to-face encounter details in 2/3/15 Office Visit note.     acetaminophen (TYLENOL) 325 MG tablet Take 2 tablets (650 mg) by mouth every 6 hours (Patient taking differently: Take 650 mg by mouth as needed )             Physical Exam:   Vitals were reviewed.  Blood pressure 107/89, pulse 66, temperature 98.2  F (36.8  C), temperature source Oral, resp. rate 13, SpO2 96 %.    General: NAD  HEENT: MMM, PERRLA, EOM intact  CV: loud pan-thoracic mechanical sound consistent with VAD  Resp: Clear to auscultation bilaterally, no wheezes, rhonchi  Abd: Soft, non-tender, BS+, no masses appreciated,  omre was negative  Skin: Not jaundiced, no rash, no ecchymoses  Extremities: warm and well perfused, palpable pulses, no edema  Neuro: A&Ox3, 3/5 strength LUE, BLE, sensation intact         Data:        ROUTINE LABS (Last four results)  CMP  Recent Labs  Lab 11/22/17  1330 11/17/17  0748    138   POTASSIUM 4.5 4.1   CHLORIDE 109 109   CO2 24 21   ANIONGAP 7 8   * 120*   BUN 25 16   CR 2.24* 1.84*   GFRESTIMATED 29* 37*   GFRESTBLACK 36* 45*   JOSÉ 8.6 8.5   PHOS  --  2.7   PROTTOTAL 7.8  --    ALBUMIN 3.2* 3.5   BILITOTAL 1.2  --    ALKPHOS 122  --    AST Unsatisfactory specimen - hemolyzed  --    ALT 27  --      CBC  Recent Labs  Lab 11/22/17  1419 11/22/17  1330 11/17/17  0748   WBC 9.3 Canceled, Test credited 6.2   RBC 3.70* Canceled, Test credited 4.20*   HGB 10.7* Canceled, Test credited 11.9*   HCT 34.4* Canceled, Test credited 38.8*   MCV 93 Canceled, Test credited 92   MCH 28.9 Canceled, Test credited 28.3   MCHC 31.1* Canceled, Test credited 30.7*   RDW 19.6* Canceled, Test credited 18.8*    Canceled, Test credited 207     INR  Recent Labs  Lab 11/22/17  1330 11/20/17  1334   INR 2.45* 2.1     Arterial Blood GasNo lab results found in last 7 days.    Imaging:  CT abd 11/22/17  1.  No acute abnormalities to explain right upper quadrant pain on  this noncontrast CT scan. No evidence for complication related to LVAD  on noncontrast CT. No retroperitoneal hemorrhage.  2.  Right upper quadrant common biliary enteric stent appears in  appropriate position. This could potentially be irritating the  patient.  3.  Colonic diverticulosis without acute inflammation.  4.  Symmetric renal cortical atrophy and simple cortical cysts.  5.  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        I personally provided care for this patient, reviewed chart, discussed course with patient, housestaff and consulting physicians.  I answered all questions  The [id number] is admitted with  evidence of pump thrombosis/ hemolysis as indicated by high LDH, urine color changes, high plasma hemoglobin but interestingly without evidence of pump malfunction, further embolization, and stable hemoglobin. The risk is incremental anticoagulation.  .    Spencer Phipps M.D.  Division of Cardiology  Department of Medicine

## 2017-11-23 NOTE — PLAN OF CARE
Problem: Patient Care Overview  Goal: Plan of Care/Patient Progress Review  Outcome: No Change  D/I/A: Patient awake this morning for breakfast, rested most of the rest of the shift, arouses when sleeping to voice or touch. L side weaker per daughter from prior stroke. Patient's VSS. Paced rhythm. Denies pain. HMII noted occasional --- flows, otherwise all WDL, no alarms, dressing change done. Heparin gtt infusing at 1000 units/hr, 10a recheck ordered for 1700. Tea colored urine. Fair appetite. Lift dependent, repositioned Q2H as allowed by patient. Echo completed at bedside. Daughter at bedside this shift.      P: Continue to monitor and notify providers as needed.

## 2017-11-23 NOTE — PLAN OF CARE
Problem: General Plan of Care (Inpatient Behavioral)  Goal: Individualization/Patient Specific Goal (IP Behavioral)  The patient and/or their representative will achieve their patient-specific goals related to the plan of care.    The patient-specific goals include:   Outcome: No Change  Pt admitted with hematuria and abdominal pain. Abdominal pain now resolved. VSS, RA, afebrile, LVAD numbers wnl, no alarms this shift. Dressing cdi. Heparin gtt infused via PIV at 1000u/hr until 0400 when order to turn off were placed. Plan to follow LDH, labs and INR. Pt is lift dependent, frequent turns, skin intact. Pt's son and daughter at bedside throughout night, supportive and involved with cares. Continue to monitor, follow poc, alert C2 MD's with changes and/or concerns.

## 2017-11-23 NOTE — ED NOTES
ED to Floor Handoff      S:  Sohan Rendon is a 67 year old male who speaks Gibraltarian and lives with family members,  in a home  They arrived in the ED by car from home with a complaint of Hematuria    Initial vitals were:   BP: 113/89  Pulse: 66  Heart Rate: 62  Temp: 98.2  F (36.8  C)  Resp: 16  SpO2: 98 %  Allergies:   Allergies   Allergen Reactions     Seasonal Allergies    .  The meds given in the ED and their home medications are:   No current facility-administered medications for this encounter.      Current Outpatient Prescriptions   Medication     magnesium oxide (MAG-OX) 400 MG tablet     pantoprazole (PROTONIX) 20 MG EC tablet     ALEK CONTOUR test strip     nystatin (MYCOSTATIN) 386336 UNIT/GM POWD     gabapentin (NEURONTIN) 100 MG capsule     loratadine (CLARITIN) 10 MG tablet     order for DME     Metoprolol Succinate (TOPROL XL PO)     losartan (COZAAR) 25 MG tablet     levETIRAcetam (KEPPRA) 750 MG tablet     tamsulosin (FLOMAX) 0.4 MG capsule     furosemide (LASIX) 20 MG tablet     warfarin (COUMADIN) 3 MG tablet     ketotifen (ZADITOR/REFRESH ANTI-ITCH) 0.025 % SOLN ophthalmic solution     ASPIRIN NOT PRESCRIBED (INTENTIONAL)     SENEXON-S 8.6-50 MG per tablet     bisacodyl (DULCOLAX) 10 MG Suppository     finasteride (PROSCAR) 5 MG tablet     atorvastatin (LIPITOR) 10 MG tablet     calcium carbonate-vitamin D 500-400 MG-UNIT TABS per tablet     simethicone (MYLICON) 80 MG chewable tablet     oxyCODONE (ROXICODONE) 5 MG IR tablet     melatonin 3 MG tablet     Thiamine HCl (VITAMIN B-1) 100 MG TABS     order for DME     allopurinol (ZYLOPRIM) 100 MG tablet     order for DME     order for DME     order for DME     order for DME     order for DME     nitroglycerin (NITROSTAT) 0.4 MG SL tablet     blood glucose monitoring (NO BRAND SPECIFIED) lancets     ORDER FOR DME     acetaminophen (TYLENOL) 325 MG tablet     Social demographics are   Social History     Social History     Marital status:       Spouse name: N/A     Number of children: N/A     Years of education: N/A     Social History Main Topics     Smoking status: Former Smoker     Smokeless tobacco: Former User     Alcohol use No     Drug use: No     Sexual activity: Not Asked     Other Topics Concern     None     Social History Narrative       B:   The patient has been ill for 1 day(s) and during this time the symptoms have remained the same.  In the ED was diagnosed with   Final diagnoses:   LVAD (left ventricular assist device) present (H)    Infection/sepsis suspected:No Isolation type; Contact   A:   In the ED these meds were given: Medications - No data to display  Drips running?  No  Labs results   Labs Ordered and Resulted from Time of ED Arrival Up to the Time of Departure from the ED   INR - Abnormal; Notable for the following:        Result Value    INR 2.45 (*)     All other components within normal limits   ROUTINE UA WITH MICROSCOPIC - Abnormal; Notable for the following:     Blood Urine Large (*)     Protein Albumin Urine 100 (*)     WBC Urine 8 (*)     RBC Urine 4 (*)     Bacteria Urine Few (*)     All other components within normal limits   COMPREHENSIVE METABOLIC PANEL - Abnormal; Notable for the following:     Glucose 148 (*)     Creatinine 2.24 (*)     GFR Estimate 29 (*)     GFR Estimate If Black 36 (*)     Albumin 3.2 (*)     All other components within normal limits   CBC WITH PLATELETS DIFFERENTIAL - Abnormal; Notable for the following:     RBC Count 3.70 (*)     Hemoglobin 10.7 (*)     Hematocrit 34.4 (*)     MCHC 31.1 (*)     RDW 19.6 (*)     All other components within normal limits   HEMOGLOBIN PLASMA - Abnormal; Notable for the following:     Hemoglobin Plasma 370 (*)     All other components within normal limits   CBC WITH PLATELETS DIFFERENTIAL   LIPASE   TROPONIN I   LACTATE DEHYDROGENASE   LACTATE DEHYDROGENASE   URINE CULTURE AEROBIC BACTERIAL     Imaging Studies:   Recent Results (from the past 24 hour(s))   CT Abdomen  Pelvis w/o Contrast    Narrative    EXAMINATION: CT ABDOMEN PELVIS W/O CONTRAST, 11/22/2017 2:54 PM    TECHNIQUE:  Helical CT images from the lung bases through the  symphysis pubis were obtained without contrast.  Coronal reformatted  images were generated at a workstation for further assessment.    COMPARISON: 5/6/2017.    HISTORY: RUQ pain LVAD pt;     FINDINGS:    Abdomen and pelvis: Evaluation for infection and malignancy is limited  without intravenous contrast.    Liver: No suspicious liver lesions.     Gallbladder: Surgically absent. Biliary stent in place with tip in the  distal duodenum.    Spleen: Normal size.    Pancreas: Atrophic and generally fatty replaced. No calcifications or  ductal dilation.    Adrenal glands: Symmetric. No adrenal gland nodules.    Kidneys: Bilateral mild to moderate renal parenchymal atrophy.  Unchanged simple cysts bilaterally. Multiple punctate nonobstructing  calyceal stones bilaterally, unchanged.    Bladder / Pelvic organs: Urinary bladder is distended without focal  abnormality. Mild prostatomegaly. Symmetric seminal vesicles.    Bowel: Redundant colon. Diverticulosis without diverticulitis. No  focal bowel wall thickening. The appendix appears prominent but normal  without evidence for appendicitis. Small mid appendiceal fecalith.  Small hiatal hernia.    Lymph nodes: No lymphadenopathy.    Fluid: No free fluid within the abdomen. No retroperitoneal  hemorrhage.    Vessels: No infrarenal aortic aneurysm. Mild to moderate  atherosclerotic disease.    Lung bases: Bibasilar scarring and atelectasis. Enlarged heart. Left  ventricular assist device without apparent complication.    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 No suspicious osseous lesions. Generalized  muscular atrophy and fatty replacement.      Impression    IMPRESSION:   1.  No acute abnormalities to explain right upper quadrant pain on  this noncontrast CT scan.  No evidence for complication related to LVAD  on noncontrast CT. No retroperitoneal hemorrhage.  2.  Right upper quadrant common biliary enteric stent appears in  appropriate position. This could potentially be irritating the  patient.  3.  Colonic diverticulosis without acute inflammation.  4.  Symmetric renal cortical atrophy and simple cortical cysts.  5.  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    I have personally reviewed the examination and initial interpretation  and I agree with the findings.    IVETTE (Dileep) MD NURY     Recent vital signs /89  Pulse 66  Temp 98.2  F (36.8  C) (Oral)  Resp 13  SpO2 96%  Cardiac Rhythm: ,      Abnormal labs/tests/findings requiring intervention:---  Pain control: good  Nausea control: pt had none  R:   Transfer assistance needed: Stand with Assist  Family present during ED course? Yes   Family currently present? Yes  Pt needs tele? Yes  Code Status: Full Code  Tasks needing to be completed:---    Aliza Guy  [facility] [phone] [place] [facility]  [phone] [facility] ED

## 2017-11-23 NOTE — PROGRESS NOTES
SPIRITUAL HEALTH SERVICES  SPIRITUAL ASSESSMENT Progress Note   Hospital Location 6C  ON-CALL    Referral Source: Hospital  requested when admitted.    Family member spoke Canadian language and indicated they would like to see the  [place] when possible.    Indicated I this request would be passed on for when Emum was in house.        PLAN: Notify Scientologist  of request.                                                                                                                                               Ángel Rosenthal MA.  Associate Staff   Pager [phone]

## 2017-11-23 NOTE — PROGRESS NOTES
[facility]   Cardiology 2  Daily Progress Note      Date of Admission: 11/22/2017  Date of Service: 11/23/2017    Sohan Rendon MRN# [id number]   Age: 67 year old YOB: 1950     Interval History    No acute events overnight. No bleeding note. Stable LVAD parameters. Denies any abdominal pain. Feels tired this afternoon, but tolerated his diet well. Continues to have dark urine.  No dizziness or lightheadedness. No fevers or chills.      Medications     Medications and allergies reviewed in EPIC.  Changes are reflected in the assessment and plan.      Physical Exam   /85  Pulse 66  Temp 97.6  F (36.4  C) (Oral)  Resp 16  Wt 84 kg (185 lb 3 oz)  SpO2 96%  BMI 28.16 kg/m2    Wt Readings from Last 1 Encounters:   11/22/17 84 kg (185 lb 3 oz)    Body mass index is 28.16 kg/(m^2).     Physical Exam   Gen: No acute distress, resting comfortably in bed, Sleepy, but responsive to questioning.    MM slightly dry.  PERRL, EOMI.    CV: LVAD hum   Lungs: CTAB  Abd: soft, non - tender. Normoactive     Drains and Tubes: No drains or tubes present     Intravascular Access and Device: Peripheral IVs    Intake/Output Summary (Last 24 hours) at 11/23/17 1257  Last data filed at 11/23/17 0900   Gross per 24 hour   Intake           405.17 ml   Output              425 ml   Net           -19.83 ml       Data     Labs & Studies of Note: I personally reviewed all the labs, imaging     Imaging:   Recent Results (from the past 24 hour(s))   CT Abdomen Pelvis w/o Contrast    Narrative    EXAMINATION: CT ABDOMEN PELVIS W/O CONTRAST, 11/22/2017 2:54 PM    TECHNIQUE:  Helical CT images from the lung bases through the  symphysis pubis were obtained without contrast.  Coronal reformatted  images were generated at a workstation for further assessment.    COMPARISON: 5/6/2017.    HISTORY: RUQ pain LVAD pt;     FINDINGS:    Abdomen and pelvis: Evaluation for infection and malignancy is limited  without  intravenous contrast.    Liver: No suspicious liver lesions.     Gallbladder: Surgically absent. Biliary stent in place with tip in the  distal duodenum.    Spleen: Normal size.    Pancreas: Atrophic and generally fatty replaced. No calcifications or  ductal dilation.    Adrenal glands: Symmetric. No adrenal gland nodules.    Kidneys: Bilateral mild to moderate renal parenchymal atrophy.  Unchanged simple cysts bilaterally. Multiple punctate nonobstructing  calyceal stones bilaterally, unchanged.    Bladder / Pelvic organs: Urinary bladder is distended without focal  abnormality. Mild prostatomegaly. Symmetric seminal vesicles.    Bowel: Redundant colon. Diverticulosis without diverticulitis. No  focal bowel wall thickening. The appendix appears prominent but normal  without evidence for appendicitis. Small mid appendiceal fecalith.  Small hiatal hernia.    Lymph nodes: No lymphadenopathy.    Fluid: No free fluid within the abdomen. No retroperitoneal  hemorrhage.    Vessels: No infrarenal aortic aneurysm. Mild to moderate  atherosclerotic disease.    Lung bases: Bibasilar scarring and atelectasis. Enlarged heart. Left  ventricular assist device without apparent complication.    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 No suspicious osseous lesions. Generalized  muscular atrophy and fatty replacement.      Impression    IMPRESSION:   1.  No acute abnormalities to explain right upper quadrant pain on  this noncontrast CT scan. No evidence for complication related to LVAD  on noncontrast CT. No retroperitoneal hemorrhage.  2.  Right upper quadrant common biliary enteric stent appears in  appropriate position. This could potentially be irritating the  patient.  3.  Colonic diverticulosis without acute inflammation.  4.  Symmetric renal cortical atrophy and simple cortical cysts.  5.  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    I have personally  reviewed the examination and initial interpretation  and I agree with the findings.    IVETTE ARRINGTON MD ([place])         Main Plans for Today   Repeat LDH  - Strart low intensity hep gtt.  Hold warfarin.    - Monitor LVAD parameters and signs of infection.      Assessment & Plan     Sohan Rendon is a 67 year old male with PMHx significant for severe ICM s/p HM II LVAD as DT c/b recurrent device thrombi, recurrent hemolysis and multiple embolic CVAs, recurrent GI/urogenital bleeding (goal INR 1.8 - 2.5), CKD Stage III, HTN, latent TB, and recurrent UTIs  who presented hemoglobinuria and abdominal pain.     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 Patient's VAD settings: Speed: 8800 RPMs,  PI: 4.5 , Power: 5 Capps. Current parameters are Flow= 3.9 lpm, Speed= 8800 rpm, Power= 5.2 capps, PI 4.5.  No obvious infectious issues.  Afebrile UA negative for LE, nitrites.    -- Fluid status: euvolemic/mildly hypovolemic   -- ACEi/ARB: Hold lisinopril with HEATH  -- BB: Continue metoprolol succinate 50 mg qAM and 25mg qHS  -- Aldosterone antagonist: none  -- Anticoagulation: HEP gtt, hold warfarin.  -- Antiplatelet: none  -- Statin: Continue atorvastatin 10 mg qday  -- LDH, haptoglobin pending. Plasma Hg elevated to 200s.  Peripheral smear pending. Large blood with few RBCs. Concern for intravascular hemolysis. Bilirubin is normal.    - Trend LDH, plasma Hg, monitor LVAD parameters.    - Echo pending.      Acute on chronic CKD stage III. Cr baseline between 1.7-2.    - Cr 2.4 this AM.    - Strict I/Os.  Hold lisinopril. Consider small fluid bolus if indicated.        Acute epigastric/right upper abdominal pain  Spontaneously resolved by the time of admission after a BM. Recent history of acalculous cholecystitis s/p stent 05/2017. History, exam, labs, and CT are not suggestive  of this as a cause of the patient's abdominal pain. Other etiologies include PUD and gastritis. Non-contrast CT with stent in good position.    - Monitor for recurrence.         Chronic Problems  Chronic Normocytic Anemia: no active signs of bleeding, will continue to monitor  Seizures s/p Multiple CVAs:PTA keppra 750 mg BID  BPH: tamsulosin and finasteride  HLD: atorvastatin  Gout: no signs of active flare, continue PTA allopurinol  GERD:pantoprazole  Insomnia: melatonin  Chronic Pain Syndrome: continue PTA oxycodone PRN     ------------------------------------------------------------------------------------------------------------------  Prophylaxis:     -GI: PPI    -DVT: Hep gtt     FEN: Cardiac diet.     Consults: PT/OT  Family: Discussed with daughter at bedside.    Code status: Full code.    Disposition: Pending further evaluation of pump thrombosis  =========================================================  Patient was discussed and evaluated with Dr. Desire Cuevas [facility]   Internal Medicine PGY3  [phone]          I personally provided care for this patient, reviewed chart, discussed course with patient, housestaff and consulting physicians.  I answered all questions.    Spencer Phipps M.D.  Division of Cardiology  Department of Medicine

## 2017-11-23 NOTE — PROGRESS NOTES
Indication of Interrogation:  Admission     Type of VAD:  Heartmate 2    Current Parameters:  Flow= 3.7 lpm, Speed= 8800 rpm, Power= 5.1 capps, PI (if applicable)= 4.4    Abnormal Alarm on History:  No    Abnormal Events/Parameters Notes:  Yes, explain: frequent PI events, occasional minor speed drops     Changes Made during Interrogation:  No  I personally provided care for this patient, reviewed chart, discussed course with patient, housestaff and consulting physicians.  I answered all questions.    Spencer Phipps M.D.  Division of Cardiology  Department of Medicine

## 2017-11-23 NOTE — PHARMACY-ADMISSION MEDICATION HISTORY
"Admission medication history interview status for the 11/22/2017 admission is complete. See Epic admission navigator for allergy information, pharmacy, prior to admission medications and immunization status.     Medication history interview sources:  Patient's Daughter    Changes made to PTA medication list (reason)  Added: None  Deleted: None  Changed: Warfarin: 3 mg-->4.5 mg nightly until Sunday 11/26/17    Additional medication history information (including reliability of information, actions taken by pharmacist):  Patient's daughter was very nice and a reliable historian. She manages her father's medications.  Confirmed pharmacy of [facility] [address] in [place].  Allergies confirmed.  Influenza vaccine: Yes, Oct. 2017  Warfarin: Patient's daughter provided dosing regimen from INR clinic (which is here at [facility]). Patient is to be taking 4.5 mg daily until this Sunday 11/26/17 before going to INR clinic for blood draw on Monday 11/27/17. Goal INR is between 1.3 and 2.3 and the last INR visit came back at 2.1 on 11/20/17 (INR visits have been every Monday this month).  Patient's daughter states both gabapentin and furosemide are taken as needed, with last doses of each being in the last \"couple months\".      Prior to Admission medications    Medication Sig Last Dose Taking? Auth Provider   warfarin (COUMADIN) 3 MG tablet Take 4.5 mg by mouth daily 11/21/2017 at Unknown time Yes Unknown, Entered By History   magnesium oxide (MAG-OX) 400 MG tablet Take 1 tablet (400 mg) by mouth 2 times daily 11/21/2017 at Unknown time Yes Jess Carter APRN CNP   pantoprazole (PROTONIX) 20 MG EC tablet Take by mouth 30-60 minutes before a meal. 11/22/2017 at Unknown time Yes Thomas Dill MD   nystatin (MYCOSTATIN) 656455 UNIT/GM POWD Apply to affected areas of skin in the groin and on the scrotum three times a day as needed. 11/22/2017 at AM Yes Thomas Dill MD   loratadine (CLARITIN) 10 MG tablet " TAKE 1 TABLET (10 MG) BY MOUTH DAILY 11/21/2017 at Unknown time Yes Thomas Dill MD   Metoprolol Succinate (TOPROL XL PO) Take 50mg by mouth every morning. Take 25mg by mouth every evening. 11/22/2017 at AM Yes Unknown, Entered By History   losartan (COZAAR) 25 MG tablet Take 0.5 tablets (12.5 mg) by mouth daily 11/22/2017 at AM Yes Evon Lemons MD   levETIRAcetam (KEPPRA) 750 MG tablet TAKE 1 TABLET (750 MG) BY MOUTH 2 TIMES DAILY 11/22/2017 at AM Yes Thomas Dill MD   tamsulosin (FLOMAX) 0.4 MG capsule TAKE 1 CAPSULE EVERY DAY 11/22/2017 at Unknown time Yes Thomas Dill MD   ketotifen (ZADITOR/REFRESH ANTI-ITCH) 0.025 % SOLN ophthalmic solution Place 1 drop into both eyes 2 times daily 11/22/2017 at PRN Yes Delgado Agosto MD   SENEXON-S 8.6-50 MG per tablet TAKE 1-2 TABLETS BY MOUTH 2 TIMES DAILY AS NEEDED FOR CONSTIPATION 11/21/2017 at PRN Yes Thomas Dill MD   bisacodyl (DULCOLAX) 10 MG Suppository Place 1 suppository (10 mg) rectally daily as needed for constipation 11/20/2017 at PRN Yes Gabe Peters MD   finasteride (PROSCAR) 5 MG tablet Take 1 tablet (5 mg) by mouth daily 11/22/2017 at Unknown time Yes Lenore Gardner PA   atorvastatin (LIPITOR) 10 MG tablet TAKE 1 TABLET (10 MG) BY MOUTH DAILY 11/21/2017 at PM Yes Thomas Dill MD   calcium carbonate-vitamin D 500-400 MG-UNIT TABS per tablet Take 1 tablet by mouth daily 11/21/2017 at Unknown time Yes Thomas Dill MD   simethicone (MYLICON) 80 MG chewable tablet Take 1 tablet (80 mg) by mouth 4 times daily  Patient taking differently: Take 80 mg by mouth every 6 hours as needed  11/21/2017 at Unknown time Yes Jess Carter APRN CNP   melatonin 3 MG tablet Take 1 tablet (3 mg) by mouth At Bedtime 11/21/2017 at Unknown time Yes Thomas Dill MD   Thiamine HCl (VITAMIN B-1) 100 MG TABS TAKE 1 TABLET (100 MG) BY MOUTH DAILY 11/22/2017 at Unknown time Yes Thomas Dill  MD Tera   allopurinol (ZYLOPRIM) 100 MG tablet Take 1 tablet (100 mg) by mouth daily 11/21/2017 at Unknown time Yes Thomas Dill MD   acetaminophen (TYLENOL) 325 MG tablet Take 2 tablets (650 mg) by mouth every 6 hours 11/20/2017 at PRN Yes Odilon Uriostegui MD   ALEK CONTOUR test strip USE TO TEST BLOOD SUGAR 2 TIMES DAILY OR AS DIRECTED.   Thomas Dill MD   gabapentin (NEURONTIN) 100 MG capsule Take 1 capsule (100 mg) by mouth 2 times daily More than a month at [facility]  Thomas Dill MD   order for DME Bilateral heel protective cushioning boots.  Dx: previous stroke with left hemiplegia.  Duration of need: 99 months.   Thomas Dill MD   furosemide (LASIX) 20 MG tablet Take 1 tablet (20 mg) by mouth as needed More than a month at [facility]  Vicky Ziegler, NP   oxyCODONE (ROXICODONE) 5 MG IR tablet Take 1 tablet (5 mg) by mouth every 4 hours as needed for moderate to severe pain More than a month at [facility]  [facility], Jess Howell, APRN CNP   order for DME Equipment being ordered: Cushioned heel boots.   Thomas Dill MD   order for DME Equipment being ordered: Wheelchair, manual, with elevated leg rests and tilt in space back.  Please fax to [facility]; I called them 11/26/16 and they requested the new order.  Face to face is in my 10/26/16 note.   Thomas Dill MD   order for DME Equipment being ordered: Wheelchair, manual.   Thomas Dill MD   order for DME Equipment being ordered: Hospital Bed, fully electric.   Thomas Dill MD   order for DME Equipment being ordered: Reclining manual w/c with bilateral elevating leg rests.   Thomas Dill MD   order for DME Equipment being ordered: Bilateral leg supports for manual wheelchair.   Thomas Dill MD   nitroglycerin (NITROSTAT) 0.4 MG SL tablet Place 1 tablet (0.4 mg) under the tongue every 5 minutes as needed for chest pain Unknown at [place]  SanjuanitaJess Melendez, APRN CNP   blood glucose  monitoring (NO BRAND SPECIFIED) lancets Use to test blood sugars 2 times daily or as directed.   Thomas Dill MD   ORDER FOR DME Equipment being ordered: Lift chair.    Please see indications and face-to-face encounter details in 2/3/15 Office Visit note.   Thomas Dill MD         Medication history completed by: TIARRA Lawrence Student Pharmacist

## 2017-11-24 NOTE — PLAN OF CARE
Problem: Patient Care Overview  Goal: Plan of Care/Patient Progress Review  Pt is Burkinan speaking, has daughter by bedside. Afebrile, VSS, on room air cardiac monitor show paced rhythm with PVC's.  Pt is more alert and talkative per the daughter than earlier today.  Pt has left sided weakness from pervious stroke. Lift dependent. Assisted up to the recliner chair via the lift, this evening.  Heparin infusion at 1000 units/hr, 10a therapeutic 0.34 till tomorrow.  Voiding per the urinal, tea/brown colored urine. LVAD WNL expect for flow which shows ---, MD aware.  Continue to monitor and notify Cards 2.

## 2017-11-24 NOTE — PHARMACY-ANTICOAGULATION SERVICE
Clinical Pharmacy - Warfarin Dosing Consult     Pharmacy has been consulted to manage this patient s warfarin therapy.  Indication: LVAD/RVAD  Therapy Goal: INR 1.8 to 2.5 (Goal addended to reflect PTA INR goal)  Warfarin Prior to Admission: Yes  Warfarin PTA Regimen: 4.5 mg daily  Dose Comments: last dose on 11/21    INR   Date Value Ref Range Status   11/24/2017 2.03 (H) 0.86 - 1.14 Final   11/23/2017 2.58 (H) 0.86 - 1.14 Final     Chromogenic Factor 10   Date Value Ref Range Status   08/10/2014 24 (L) 70 - 130 % Final     Comment:     Therapeutic Range:  A Chromogenic Factor 10 level of approximately 20-40%   inversely correlates with an INR of 2-3 for patients receiving Warfarin.   Chromogenic Factor 10 levels below 20% indicate an INR greater than 3 and   levels above 40% indicate an INR less than 2.       Factor 2 Assay   Date Value Ref Range Status   07/21/2014 25 (L) 60 - 140 % Final     Comment:     Analyte Specific Reagents (ASRs) are used in many laboratory tests necessary   for   standard medical care and generally do not require FDA approval.  This test   was   developed and its performance characteristics determined by [facility].  It has not been cleared or approved by   the US Food and Drug Administration.         Recommend warfarin 4 mg today.  Pharmacy will monitor Sohan Rendon daily and order warfarin doses to achieve specified goal.      Please contact pharmacy as soon as possible if the warfarin needs to be held for a procedure or if the warfarin goals change.

## 2017-11-24 NOTE — PROGRESS NOTES
Pt and Pt's daughter did not have any VAD related questions/concerns. Emotional support offered.

## 2017-11-24 NOTE — PLAN OF CARE
Problem: Patient Care Overview  Goal: Plan of Care/Patient Progress Review  Outcome: No Change  D: LVAD. Pump thrombus. Hematuria.  I/A: VSS. Denies pain. Paced rhythm with PVCs. Family caregivers at bedside. Pt was up in recliner until 2100. LVAD numbers WNL except flows of --- (MD aware). Heparin drip continues at 1000 u/hr. Urine continues to be dark brown/tea colored.  P: Continue to monitor.

## 2017-11-24 NOTE — PROGRESS NOTES
[facility]   Cardiology 2  Daily Progress Note      Date of Admission: 11/22/2017  Date of Service: 11/24/2017    Sohan Rendon MRN# [id number]   Age: 67 year old YOB: 1950     Interval History    No acute events overnight. More abdominal pain this morning. No nausea, vomiting fever or chills.  Feels more constipated. Decreased PO intake today per family. No shortness of breath.  Continues to have dark colored urine.      Medications     Medications and allergies reviewed in [facility].  Changes are reflected in the assessment and plan.      Physical Exam   BP (!) 88/78  Pulse 66  Temp 97.8  F (36.6  C) (Oral)  Resp 16  Wt 84 kg (185 lb 3 oz)  SpO2 99%  BMI 28.16 kg/m2    Wt Readings from Last 1 Encounters:   11/22/17 84 kg (185 lb 3 oz)    Body mass index is 28.16 kg/(m^2).     Physical Exam   Gen: No acute distress, resting comfortably in bed, Sleepy, but responsive to questioning.    MM slightly dry.  PERRL, EOMI.   JVP 5-6cm   CV: LVAD hum   Lungs: CTAB  Abd: soft, non - tender. Normoactive     Drains and Tubes: No drains or tubes present     Intravascular Access and Device: Peripheral IVs    Intake/Output Summary (Last 24 hours) at 11/24/17 1704  Last data filed at 11/24/17 1600   Gross per 24 hour   Intake           690.17 ml   Output             1750 ml   Net         -1059.83 ml       Data     Labs & Studies of Note: I personally reviewed all the labs, imaging     Imaging:   No results found for this or any previous visit (from the past 24 hour(s)).        Assessment & Plan     Sohan Rendon is a 67 year old male with PMHx significant for severe ICM s/p HM II LVAD as DT c/b recurrent device thrombi, recurrent hemolysis and multiple embolic CVAs, recurrent GI/urogenital bleeding (goal INR 1.8 - 2.5), CKD Stage III, HTN, latent TB, and recurrent UTIs  who presented hemoglobinuria and abdominal pain.     Ischemic cardiomyopathy s/p HM II LVAD   Acute on chronic pump  thrombosis  Complicated by recurrent device thrombi, recurrent hemolysis and multiple embolic CVAs, recurrent GI/urogenital bleeding. Patient's VAD settings: Speed: 8800 RPMs,  PI: 4.5 , Power: 5 Capps. Current parameters are Flow= 3.9 lpm, Speed= 8800 rpm, Power= 5.2 capps, PI 4.5.  No obvious infectious issues.  Afebrile UA negative for LE, nitrites.    -- Fluid status: Hypovolemic. PIs lower 11/24.  Normal saline 500 cc over 4 hours.    -- ACEi/ARB: Hold Losartan with HEATH  -- BB: Continue metoprolol succinate 50 mg qAM and 25mg qHS  -- Aldosterone antagonist: none  -- Anticoagulation: d/c HEP gtt, resume warfarin.11/24  -- Antiplatelet: none  -- Statin: Continue atorvastatin 10 mg qday    Acute on chronic CKD stage III. Cr baseline between 1.7-2.    - Strict I/Os.  Hold losartan and diuretics.   - Fluids today.  Trend Cr.  - Post void residual checked 180 11/24.      Acute epigastric/right upper abdominal pain  Spontaneously resolved by the time of admission after a BM. Recent history of acalculous cholecystitis s/p stent 05/2017. History, exam, labs, and CT are not suggestive of this as a cause of the patient's abdominal pain. Other etiologies include PUD and gastritis. Non-contrast CT with stent in good position.    - Monitor for recurrence.  Briefly discussed with GI recs to trend LFTs and given description of waxing and waning pain unlikely related to stent occlusion    Chronic Problems  Chronic Normocytic Anemia: no active signs of bleeding, will continue to monitor  Seizures s/p Multiple CVAs:PTA keppra 750 mg BID  BPH: tamsulosin and finasteride  HLD: atorvastatin  Gout: no signs of active flare, continue PTA allopurinol  GERD:pantoprazole  Insomnia: melatonin  Chronic Pain Syndrome: continue PTA oxycodone PRN     ------------------------------------------------------------------------------------------------------------------  Prophylaxis:     -GI: PPI    -DVT: Warfarin    FEN: Cardiac diet.     Consults:  PT/OT  Family: Discussed with daughter at bedside.    Code status: Full code.    Disposition: Pending fluid resuscitation, and improved Cr.  =========================================================  Patient was discussed and evaluated with Dr. Cristo Toledo   Internal Medicine PGY3  [phone]      I have reviewed today's vital signs, notes, medications, labs and imaging. I have also seen and examined the patient and agree with the findings and plan as outlined above with the following additions.  Pt has had chronic issues with elevated LDH and hemoglobinuria which has been unresponsive to IV anticoagulation.  Previous decision made to focus care on symptomatology.  Pt's Hb grossly stable.  Also with no symptoms of heart failure and actually appears hypovolemic on exam.  Will give gentle IVF.  Will d/c heparin gtt and reinitiate coumadin.      Evon Lemons MD  Section Head - Advanced Heart Failure, Transplantation and Mechanical Circulatory Support  Co-Director - Adult Congenital and Cardiovascular Genetics Center  Associate Professor of Medicine, [facility]

## 2017-11-25 NOTE — PROGRESS NOTES
EVELIN: Pt with h/y of ICM,HM2 ,  hemolysis,CVAs, GI ,uro bleeding, CKD, HTN,latent TB, UTI, here due to abdominal pain,bleeding per MD note.   Pt alert and oriented X 4,family at bedside,helping with care, LVAD parameters stable,no alarms during shift,dressing CDI,  VSS, paced, HR 61,MAP 98, denies any pain,nausea,palpitation,no SOB, O2 sat % on RA. INR 2.03 (11/24)  Plan: Continue to monitor,notify MD as needed.

## 2017-11-25 NOTE — PROGRESS NOTES
Visited with pt/family on the basis triaged for [facility] for spiritual support for the pt/family.  Reflected with pt/family around their hospital experience, sources of spiritual and emotional support and current spiritual health needs. Pt s daughter talked about her dad current situation and what it means for her and her dad. During my visit, pt was laying down on his bed. His daughter was with him in the room. During our conversation with pt. s daughter, reported that, she worries about the health of her dad. Her dad has been in and out last 3 years.  I let them know that I could be of support of the pt and his family.  I would be able to coordinate and participate as a spiritual support for both him and his family. Pt receives support from his children and his extended family. Pt/family reported that their tanya is important to them and hope for the future and trust in God.  Emotional support. Reflective conversation integrating illness elements and family spiritual narratives.  I shared and reading conversation that would invite God into the room and to bless those present, support them in their suffering.  I provided a special prayer asking of God to help their son and to ease and eliminate any suffering and pain that they feel. I gave Islamic Prayer Booklet and several Prayer Bookmarks.     Pt/family received spiritual support and reflective conversation in the context of this hospitalization. The pt/family expressed appreciation for the visit and the encouragement that they felt that they have God s support in their struggles. They requested ongoing Taoism  support.  PLAN: Will continue to provide support to pt/family during their hospitalization at least 1x/wk

## 2017-11-25 NOTE — PLAN OF CARE
Problem: Patient Care Overview  Goal: Plan of Care/Patient Progress Review  Surinamese speaking patient admitted 11/22 for hematuria and abdominal pain. VSS, denies pain. Paced with occasional PVC's. LVAD numbers WNL (flow ---, team aware), no alarms noted. Dressing changed this shift. 500 ml NS bolus x1. +BM today. Bladder scanned x1, post void residual 120 ml. Lift dependent, repositioned every 2 hrs. Daughter at bedside assisting with cares. Anticipate discharge home with improvement of fluid status and creatinine. Continue to assess and monitor, notify Cards 2 with concerns.

## 2017-11-25 NOTE — PROGRESS NOTES
[facility]   Cardiology 2  Daily Progress Note    Interval events:     No acute events overnight.  Afebrile with stable vital signs.    Diet: Tolerating diet well.    Bowel Movement: No BM yet.  Voiding: Without difficulty. Denies any dysuria. Continues to have tea colored urine.    Pain: Intermittent right upper quadrant abdominal pain.          Remainder of brief 4 point ROS remains negative unless stated in the HPI    Medications     Warfarin Therapy Reminder       - MEDICATION INSTRUCTIONS -       - MEDICATION INSTRUCTIONS -       - MEDICATION INSTRUCTIONS -         warfarin  4.5 mg Oral ONCE at 18:00     lactulose  10 g Oral Once     senna-docusate  1 tablet Oral At Bedtime     polyethylene glycol  17 g Oral Once     melatonin  3 mg Oral At Bedtime     acetaminophen  650 mg Oral Q6H     gabapentin  100 mg Oral BID     levETIRAcetam  750 mg Oral BID     atorvastatin  10 mg Oral Daily     metoprolol (TOPROL-XL) 24 hr tablet 50 mg  50 mg Oral BID     allopurinol  100 mg Oral Daily     finasteride  5 mg Oral Daily     tamsulosin  0.4 mg Oral Daily     ketotifen  1 drop Both Eyes BID     pantoprazole  20 mg Oral QAM     sodium chloride (PF)  3 mL Intracatheter Q8H       Allergies:    Allergies   Allergen Reactions     Seasonal Allergies        Physical Exam     Temp: 98.9  F (37.2  C) Temp  Min: 97.4  F (36.3  C)  Max: 98.9  F (37.2  C)  SpO2: 96 % SpO2  Min: 96 %  Max: 100 %    No Data Recorded  BP: 109/90 Systolic (24hrs), Av , Min:88 , Max:120   Diastolic (24hrs), Av, Min:78, Max:99    MAPS:  90s.      LVAD  Flow 3.9  PI: 4.9 - 5    Speed: 8800   Power: 4.9  No alarms reported.    Intake/output over 24 hours:   I: 837  O: 1.9 L  Net: -1.2 L    Wt Readings from Last 3 Encounters:   17 84 kg (185 lb 3 oz)   17 81.6 kg (180 lb)   17 77.1 kg (170 lb)     No weight today.      Gen: No acute distress, resting comfortably.    HEENT: MM moist, EOMI.    CV: LVAD hum,  JVP Non-elevated   Resp: CTAB  Abd: Soft, non-tender, No tenderness   Ext: no significant lower extremity edema. Warm and well perfused  Neuro: No focal findings.      Data   Data reviewed today:      Recent Labs  Lab 11/25/17  0844 11/24/17  0757 11/23/17  1552  11/23/17  0531  11/22/17  1330   WBC 8.5 8.1 7.2  < > 9.3  < > Canceled, Test credited   HGB 9.1* 10.1* 10.0*  < > 9.9*  < > Canceled, Test credited   MCV 96 93 92  < > 91  < > Canceled, Test credited    169 175  < > 179  < > Canceled, Test credited   INR 2.03* 2.03*  --   --  2.58*  --  2.45*    140 137  --  141  --  140   POTASSIUM 4.2 4.3 4.0  --  3.9  --  4.5   CHLORIDE 112* 111* 110*  --  110*  --  109   CO2 17* 21 19*  --  21  --  24   BUN 31* 31* 29  --  28  --  25   CR 2.65* 2.56* 2.43*  --  2.41*  --  2.24*   ANIONGAP 10 8 8  --  10  --  7   JOSÉ 8.7 8.7 8.4*  --  8.3*  --  8.6   * 102* 121*  --  144*  --  148*   ALBUMIN 2.9* 3.1*  --   --  3.0*  --  3.2*   PROTTOTAL 7.2 7.6  --   --  7.1  --  7.8   BILITOTAL 1.3 1.6*  --   --  1.2  --  1.2   ALKPHOS 119 124  --   --  120  --  122   ALT 39 38  --   --  26  --  27   AST Unsatisfactory specimen - hemolyzed Canceled, Test credited  --   --  Canceled, Test credited  --  Unsatisfactory specimen - hemolyzed   LIPASE  --   --   --   --   --   --  227   TROPI  --   --   --   --   --   --  <0.015   < > = values in this interval not displayed.    Imaging:     No results found for this or any previous visit (from the past 24 hour(s)).    Assessment & Plan   Sohan Rendon is a 67 year old male with PMHx significant for severe ICM s/p HM II LVAD as DT c/b recurrent device thrombi, recurrent hemolysis and multiple embolic CVAs, recurrent GI/urogenital bleeding (goal INR 1.8 - 2.5), CKD Stage III, HTN, latent TB, and recurrent UTIs  who presented hemoglobinuria and abdominal pain.      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 Patient's VAD settings: Speed: 8800 RPMs,  PI: 4.5 , Power: 5 Capps. Current parameters are Flow= 3.9 lpm, Speed= 8800 rpm, Power= 5.2 capps, PI 4.5.  No obvious infectious issues.  Afebrile UA negative for LE, nitrites.    -- Fluid status: Appears euvolemic.  Will eval IVC at bedside today.    -- ACEi/ARB: Hold Losartan    -- BB: Continue metoprolol succinate 50 mg qAM and 25mg qHS  -- Aldosterone antagonist: none  -- Anticoagulation: continue warfarin.  -- Antiplatelet: none  -- Statin: Continue atorvastatin 10 mg qday     Acute on chronic CKD stage III. Cr baseline between 1.7-2.    - Strict I/Os.  Hold losartan and diuretics.   - Fluids today.  Trend Cr.  - Post void residual checked 180 11/24.    - Assess IVC      Acute epigastric/right upper abdominal pain  Constipation  Spontaneously resolved by the time of admission after a BM. Recent history of acalculous cholecystitis s/p stent 05/2017. History, exam, labs, and CT are not suggestive of this as a cause of the patient's abdominal pain. Other etiologies include PUD and gastritis. Non-contrast CT with stent in good position.    - Monitor for recurrence.  Briefly discussed with GI recs to trend LFTs and given description of waxing and waning pain unlikely related to stent occlusion  - Increase bowel regimen today.       Chronic Problems  Chronic Normocytic Anemia: no active signs of bleeding, will continue to monitor  Seizures s/p Multiple CVAs:PTA keppra 750 mg BID  BPH: tamsulosin and finasteride  HLD: atorvastatin  Gout: no signs of active flare, continue PTA allopurinol  GERD:pantoprazole  Insomnia: melatonin  Chronic Pain Syndrome: continue PTA oxycodone PRN                  ------------------------------------------------------------------------------------------------------------------  Prophylaxis:     -GI: PPI    -DVT: Warfarin     FEN: Cardiac diet.      Consults: PT/OT  Family: Discussed with daughter at bedside.    Code status: Full code.    Disposition: Pending fluid assessment, and improved Cr.  =========================================================  Patient will be seen and discussed with Dr. Cristo Toledo  [facility]  Cardiology: 2  Pager: [phone]  Please see sticky note for cross cover information      I have reviewed today's vital signs, notes, medications, labs and imaging. I have also seen and examined the patient and agree with the findings and plan as outlined above.    Evon Lemons MD  Section Head - Advanced Heart Failure, Transplantation and Mechanical Circulatory Support  Co-Director - [facility]  Associate Professor of Medicine, [facility]

## 2017-11-26 NOTE — PLAN OF CARE
Problem: Patient Care Overview  Goal: Plan of Care/Patient Progress Review  Outcome: Improving  EVELIN: Pt with h/y of ICM,HM2 ,  hemolysis,CVAs, GI ,uro bleeding, CKD, HTN,latent TB, UTI, here due to abdominal pain,bleeding per MD note.   Pt alert and oriented X 4,family at bedside,helping with care, LVAD parameters: flow 3.5, 3.8,  (---) intermittently,MD aware ,no alarms during shift,dressing CDI,  VSS, paced, PVC occasional, HR 61,MAP 97,92, denies any pain,nausea,palpitation,no SOB, O2 sat 97% on RA. Reported headache once, Tylenol  given with effect. Slept well.  Plan: Continue to monitor,notify MD as needed.

## 2017-11-26 NOTE — PLAN OF CARE
Problem: Patient Care Overview  Goal: Plan of Care/Patient Progress Review  Patient with LVAD HM2, admitted 11/22 for hematuria and abdominal pain. Luxembourger speaking.   VSS, endorses upper abdominal pain. LVAD numbers WNL, no alarms noted. Paced with occasional PVC's. Room air.   Lift dependent, repositioned every 2 hrs. Incontinent of urine x1 this morning. Up to chair as tolerated. Daughter at bedside assisting with cares.   Likely discharge to home with family support this afternoon. Continue to assess and monitor, notify Cards 2 with concerns

## 2017-11-26 NOTE — PLAN OF CARE
Problem: Patient Care Overview  Goal: Plan of Care/Patient Progress Review  DISCHARGE:    Discharged to:  Home  Via:  EMS  Accompanied by: Daughter  Discharge Instructions:  diet, activity, medications, follow up appointments, when to call the MD, and what to watchout for (i.e. s/s of infection, increasing SOB, palpitations, LVAD alarms, chest pain,)  Prescriptions: [facility] med picked up from [facility]. medication list reviewed & sent with pt  Follow Up Appointments:  Arranged; information given  Belongings:  All sent with pt  IV:  Out  Telemetry:  Off  Pt exhibits understanding of above discharge instructions; all questions answered.

## 2017-11-26 NOTE — DISCHARGE SUMMARY
[facility]   Cardiology 2  Discharge Summary    Sohan Rendon  [id number]    Date of Admission:  11/22/2017  Date of Discharge:  11/26/2017   Admitting Physician:  Spencer Phipps MD  Discharge Physician:  Evon Lemons MD  Discharging Service:  Cardiology 2           Follow up Summary:     Future Appointments  Date Time Provider Department Center   11/27/2017 9:00 AM Stefany Alexandra [facility]   11/28/2017 9:00 AM Orlando Lopez [facility]   12/13/2017 10:00 AM 1, Uc Cv Device [facility]   12/13/2017 10:40 AM Evon Lemons MD [facility]   3/9/2018 10:30 AM 1, Uc Cv Device [facility]   3/9/2018 11:00 AM Zuleika Patterson APRN [facility]       Primary Care Physician   Thomas Dill    Discharge Diagnoses      1] 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       Consultations This Hospital Stay     None    Hospital Course       Ischemic cardiomyopathy s/p HM II LVAD   Acute on chronic pump thrombosis  Complicated by recurrent device thrombi, recurrent hemolysis and multiple embolic CVAs, recurrent GI/urogenital bleeding. Patient's VAD settings: Speed: 8800 RPMs,  PI: 4.5 , Power: 5 Capps. Current parameters are Flow= 3.9 lpm, Speed= 8800 rpm, Power= 5.2 capps, PI 4.5.  No obvious infectious issues.  Afebrile UA negative for LE, nitrites, negative culture.      Initially treated with both heparin and Warfarin given concern for  worsening hemolysis in the setting of pump thrombosis. No LVAD alarms or hemodynamic changes on admission.  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 Multiple discussion with family in the past that options were limited if his thrombosis were to worsen and have a hemodynamic impact. He is not a candidate for pump exchange. The family voiced understanding of the plan to achieve as much as possible without any escalation of care. They requested we discontinue medications that are not vital.  The below are his medication changes at discharge. In the past, DNR/DNI status has been discussed, but it appears the family has requested a full code status on presentation. This discussion will continue as an outpatient.      - If the patient were to have any further hemolysis/hematuria and other concerns for worsening pump thrombosis: options to treat this are limited. Further anticoagulation with heparin gtt have not been helpful and focus should be placed on symptom control.      --Continue metoprolol succinate 50 mg qAM and 25mg qHS  --D/c Losartan with waxing and waning kidney function  -- Anticoagulation: continue warfarin. Goal 1.8-2.5  -- Antiplatelet: none  -- Euvolemic on discharge       Acute on chronic CKD stage IV. Cr baseline between 1.7-2.    - Cr peaked at ~2.5 on admission.  Possible slight worsening due to pre-renal etiology with decreased PO intake on admission. Given gentle fluid resuscitation. Pigment nephropathy is possible as well. Again, limited options for treatment given his chronic issues outlined above      Acute epigastric/right upper abdominal pain  Constipation  Spontaneously resolved by the time of admission after a BM. Recent history of acalculous cholecystitis s/p stent 05/2017. History, exam, labs, and CT are not suggestive of this as a cause of the patient's abdominal pain. Other etiologies  include PUD and gastritis. Non-contrast CT with stent in good position.    - Continue bowel regimen.         Chronic Problems  Seizures s/p Multiple CVAs: Continue keppra 750 mg BID  BPH: tamsulosin and finasteride  HLD: d/c atorvastatin  Gout: d/c allopurinol  GERD: D/c pantoprazole per family request - Ranitidine 75 mg daily  Insomnia: melatonin    Physical Exam   Blood pressure 98/85, pulse 66, temperature 97.9  F (36.6  C), temperature source Oral, resp. rate 16, weight 84.2 kg (185 lb 10 oz), SpO2 96 %.  General: No acute distress, elderly male that appears fatigued   HEENT: MM moist, EOMI, PERRL  Neck: Supple  Respiratory: CTAB  Heart/CV: LVAD hum   Abdomen/GI: Soft, non-tender  NA bowel sounds. Drive line site - clean, dry, and intact  Extremities/MSK: No edema  Skin: No rash  Neuro: Non-focal    Lines/Tubes:  None    Significant Results and Procedures     See below    Pending Results     Unresulted Labs Ordered in the Past 30 Days of this Admission     Date and Time Order Name Status Description    11/22/2017 2024 Blood Morphology Pathologist Review In process           Discharge Medications   Discharge Medication List as of 11/26/2017  3:51 PM      START taking these medications    Details   ranitidine (ZANTAC) 75 MG tablet Take 1 tablet (75 mg) by mouth At Bedtime, Disp-30 tablet, R-3, E-Prescribe         CONTINUE these medications which have NOT CHANGED    Details   warfarin (COUMADIN) 3 MG tablet Take 4.5 mg by mouth daily, Historical      nystatin (MYCOSTATIN) 013153 UNIT/GM POWD Apply to affected areas of skin in the groin and on the scrotum three times a day as needed.Disp-60 g, [id number]      loratadine (CLARITIN) 10 MG tablet TAKE 1 TABLET (10 MG) BY MOUTH DAILY, Disp-90 tablet, R-2, E-Prescribe      Metoprolol Succinate (TOPROL XL PO) Take 50mg by mouth every morning. Take 25mg by mouth every evening., Historical      levETIRAcetam (KEPPRA) 750 MG tablet TAKE 1 TABLET (750 MG) BY MOUTH 2 TIMES  DAILY, Disp-180 tablet, R-3, E-Prescribe      tamsulosin (FLOMAX) 0.4 MG capsule TAKE 1 CAPSULE EVERY DAY, Disp-90 capsule, R-3, E-Prescribe      ketotifen (ZADITOR/REFRESH ANTI-ITCH) 0.025 % SOLN ophthalmic solution Place 1 drop into both eyes 2 times daily, Disp-1 Bottle, R-11, E-PrescribeInstructions in [facility] if possible      SENEXON-S 8.6-50 MG per tablet TAKE 1-2 TABLETS BY MOUTH 2 TIMES DAILY AS NEEDED FOR CONSTIPATION, Disp-60 tablet, R-3, E-PrescribeDX Code Needed  .      bisacodyl (DULCOLAX) 10 MG Suppository Place 1 suppository (10 mg) rectally daily as needed for constipation, Disp-5 suppository, R-1, E-Prescribe      finasteride (PROSCAR) 5 MG tablet Take 1 tablet (5 mg) by mouth daily, Disp-90 tablet, R-3, E-Prescribe      calcium carbonate-vitamin D 500-400 MG-UNIT TABS per tablet Take 1 tablet by mouth daily, Disp-90 tablet, R-3, E-Prescribe      simethicone (MYLICON) 80 MG chewable tablet Take 1 tablet (80 mg) by mouth 4 times daily, Disp-180 tablet, R-5, E-Prescribe      melatonin 3 MG tablet Take 1 tablet (3 mg) by mouth At Bedtime, Historical      Thiamine HCl (VITAMIN B-1) 100 MG TABS TAKE 1 TABLET (100 MG) BY MOUTH DAILY, Disp-90 tablet, R-3, E-Prescribe      acetaminophen (TYLENOL) 325 MG tablet Take 2 tablets (650 mg) by mouth every 6 hours, Disp-100 tablet, R-0, Fax      ALEK CONTOUR test strip USE TO TEST BLOOD SUGAR 2 TIMES DAILY OR AS DIRECTED., Disp-100 strip, R-2, E-Prescribe      gabapentin (NEURONTIN) 100 MG capsule Take 1 capsule (100 mg) by mouth 2 times daily, Disp-60 capsule, R-3, E-Prescribe      !! order for DME Bilateral heel protective cushioning boots.  Dx: previous stroke with left hemiplegia.  Duration of need: 99 months.Disp-2 each, R-0, Local Print      furosemide (LASIX) 20 MG tablet Take 1 tablet (20 mg) by mouth as needed, Disp-20 tablet, R-11, E-Prescribe      oxyCODONE (ROXICODONE) 5 MG IR tablet Take 1 tablet (5 mg) by mouth every 4 hours as needed for moderate to  severe pain, Disp-30 tablet, R-0, Local Print      !! order for DME Equipment being ordered: Cushioned heel boots.Disp-2 each, R-0, Local Print      !! order for DME Equipment being ordered: Wheelchair, manual, with elevated leg rests and tilt in space back.  Please fax to [facility]; I called them 11/26/16 and they requested the new order.  Face to face is in my 10/26/16 note.Disp-1 each, R-0, Local Print      !! order for DME Equipment being ordered: Wheelchair, manual.Disp-1 each, R-0, Local Print      !! order for DME Equipment being ordered: Hospital Bed, fully electric.Disp-1 each, R-0, Local Print      !! order for DME Equipment being ordered: Reclining manual w/c with bilateral elevating leg rests.Disp-1 each, R-0, Local Print      !! order for DME Equipment being ordered: Bilateral leg supports for manual wheelchair.Disp-2 each, R-0, Local Print      nitroglycerin (NITROSTAT) 0.4 MG SL tablet Place 1 tablet (0.4 mg) under the tongue every 5 minutes as needed for chest pain, Disp-30 tablet, R-1, E-Prescribe      blood glucose monitoring (NO BRAND SPECIFIED) lancets Use to test blood sugars 2 times daily or as directed.Disp-1 Box, [id number]      !! ORDER FOR DME Equipment being ordered: Lift chair.    Please see indications and face-to-face encounter details in 2/3/15 Office Visit note.Disp-1 Device, R-0, Local Print       !! - Potential duplicate medications found. Please discuss with provider.      STOP taking these medications       magnesium oxide (MAG-OX) 400 MG tablet Comments:   Reason for Stopping:         pantoprazole (PROTONIX) 20 MG EC tablet Comments:   Reason for Stopping:         losartan (COZAAR) 25 MG tablet Comments:   Reason for Stopping:         atorvastatin (LIPITOR) 10 MG tablet Comments:   Reason for Stopping:         allopurinol (ZYLOPRIM) 100 MG tablet Comments:   Reason for Stopping:             Discharge Orders     Discharge Procedure Orders  Medication Therapy Management Referral    Referral Type: Med Therapy Management     Reason for your hospital stay   Order Comments: Abdominal pain, concern for pump thrombus     Activity   Order Comments: Your activity upon discharge: activity as tolerated   Order Specific Question Answer Comments   Is discharge order? Yes      Discharge Instructions     Full Code     Diet   Order Comments: Follow this diet upon discharge: Orders Placed This Encounter     Combination Diet Low Saturated Fat Na <2400mg Diet, No Caffeine Diet   Order Specific Question Answer Comments   Is discharge order? Yes         Allergies   Allergies   Allergen Reactions     Seasonal Allergies      Data   Most Recent 3 CBC's:  Recent Labs   Lab Test  11/26/17   0955  11/25/17   0844  11/24/17   0757   WBC  9.8  8.5  8.1   HGB  9.3*  9.1*  10.1*   MCV  95  96  93   PLT  173  160  169      Most Recent 3 BMP's:  Recent Labs   Lab Test  11/26/17   0955 11/25/17   0844  11/24/17   0757   NA  137  139  140   POTASSIUM  4.3  4.2  4.3   CHLORIDE  109  112*  111*   CO2  20  17*  21   BUN  28  31*  31*   CR  2.55*  2.65*  2.56*   ANIONGAP  9  10  8   JOSÉ  8.9  8.7  8.7   GLC  135*  123*  102*     Most Recent 2 LFT's:  Recent Labs   Lab Test  11/26/17   0955  11/25/17   0844   AST  Unsatisfactory specimen - hemolyzed  Unsatisfactory specimen - hemolyzed   ALT  38  39   ALKPHOS  130  119   BILITOTAL  1.5*  1.3     Most Recent INR's and Anticoagulation Dosing History:  Anticoagulation Dose History     Recent Dosing and Labs Latest Ref Rng & Units 11/13/2017 11/20/2017 11/22/2017 11/23/2017 11/24/2017 11/25/2017 11/26/2017    ZZ IMS TEMPLATE - - - - - - 4.5 mg -    Warfarin 4 mg - - - - - 4 mg - -    INR 0.86 - 1.14 1.80 2.1 2.45(H) 2.58(H) 2.03(H) 2.03(H) 2.03(H)    INR 0.86 - 1.14 - - - - - - -    INR Point of Care 0.86 - 1.14 - - - - - - -    Chromogenic Factor 10 70 - 130 % - - - - - - -    Factor 2 60 - 140 % - - - - - - -        Most Recent 6 Bacteria Isolates From Any Culture (See EPIC  Reports for Culture Details):  Recent Labs   Lab Test  11/22/17 2016 11/22/17   1432  06/30/17   0745  06/30/17   0600  06/28/17 2007 05/06/17   1330   CULT  Canceled, Test credited  Cancelled by lab - Specimen never received.    Canceled, Test credited  Cancelled by lab - Specimen never received.    No growth  No growth  No growth  No growth  50,000 to 100,000 colonies/mL Escherichia coli ESBL ESBL (extended beta   lactamase) producing organisms require contact precautions.  Critical Value/Significant Value called to and read back by Dhaval Ventura RN U4E   2319 06.29.17 CF  *  No growth       Results for orders placed or performed during the hospital encounter of 11/22/17   CT Abdomen Pelvis w/o Contrast    Narrative    EXAMINATION: CT ABDOMEN PELVIS W/O CONTRAST, 11/22/2017 2:54 PM    TECHNIQUE:  Helical CT images from the lung bases through the  symphysis pubis were obtained without contrast.  Coronal reformatted  images were generated at a workstation for further assessment.    COMPARISON: 5/6/2017.    HISTORY: RUQ pain LVAD pt;     FINDINGS:    Abdomen and pelvis: Evaluation for infection and malignancy is limited  without intravenous contrast.    Liver: No suspicious liver lesions.     Gallbladder: Surgically absent. Biliary stent in place with tip in the  distal duodenum.    Spleen: Normal size.    Pancreas: Atrophic and generally fatty replaced. No calcifications or  ductal dilation.    Adrenal glands: Symmetric. No adrenal gland nodules.    Kidneys: Bilateral mild to moderate renal parenchymal atrophy.  Unchanged simple cysts bilaterally. Multiple punctate nonobstructing  calyceal stones bilaterally, unchanged.    Bladder / Pelvic organs: Urinary bladder is distended without focal  abnormality. Mild prostatomegaly. Symmetric seminal vesicles.    Bowel: Redundant colon. Diverticulosis without diverticulitis. No  focal bowel wall thickening. The appendix appears prominent but normal  without evidence  for appendicitis. Small mid appendiceal fecalith.  Small hiatal hernia.    Lymph nodes: No lymphadenopathy.    Fluid: No free fluid within the abdomen. No retroperitoneal  hemorrhage.    Vessels: No infrarenal aortic aneurysm. Mild to moderate  atherosclerotic disease.    Lung bases: Bibasilar scarring and atelectasis. Enlarged heart. Left  ventricular assist device without apparent complication.    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 No suspicious osseous lesions. Generalized  muscular atrophy and fatty replacement.      Impression    IMPRESSION:   1.  No acute abnormalities to explain right upper quadrant pain on  this noncontrast CT scan. No evidence for complication related to LVAD  on noncontrast CT. No retroperitoneal hemorrhage.  2.  Right upper quadrant common biliary enteric stent appears in  appropriate position. This could potentially be irritating the  patient.  3.  Colonic diverticulosis without acute inflammation.  4.  Symmetric renal cortical atrophy and simple cortical cysts.  5.  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    I have personally reviewed the examination and initial interpretation  and I agree with the findings.    IVETTE ARRINGTON MD (Brandon)     *Note: Due to a large number of results and/or encounters for the requested time period, some results have not been displayed. A complete set of results can be found in Results Review.     Discharge Disposition   Discharged to home  Condition at discharge: Fair  Code Status   Full Code    Pt was seen and discussed with Dr. Cristo Toledo MD    PGY3          I,Evon Lemons MD, have seen and examined this patient. I have discussed with the team and agree with the findings and discharge plan. I have reviewed the hospital course with the patient and have spent 35 minutes in the process of discharge, including discharge planning with patient  education, medication teaching, and the coordination of care to outpatient providers.  It has been a pleasure to participate in the care of your patient - please do not hesitate to contact me with any questions.    Evon Lemons MD  Section Head - Advanced Heart Failure, Transplantation and Mechanical Circulatory Support  Co-Director - Adult Congenital and Cardiovascular Genetics Center  Associate Professor of Medicine, [facility]

## 2017-11-26 NOTE — PLAN OF CARE
Problem: Patient Care Overview  Goal: Plan of Care/Patient Progress Review  Gabonese speaking patient admitted 11/22 for hematuria and abdominal pain. VSS, denies pain. Paced with occasional PVC's. LVAD numbers WNL (flow ---, team aware), no alarms noted. Dressing changed this shift. Lactulose given x1, +BM today. Lift dependent, repositioned every 2 hrs. Up to chair as tolerated. Daughter at bedside assisting with cares. Anticipate discharge home with improvement of fluid status and creatinine. Continue to assess and monitor, notify Cards 2 with concerns.

## 2017-11-27 NOTE — TELEPHONE ENCOUNTER
Spoke with home care nurse and approved resumption of visits; fax number provided. Martina Arevalo RN November 27, 2017 3:35 PM

## 2017-11-27 NOTE — TELEPHONE ENCOUNTER
Yancy called form [facility] stating that patient was just released from the hospital on 11/26 from blood in urine. She is now in need of resumption of care orders 1d1. Skilled nursing 2w1, 1w2, 3PRN    Yancy can be reached with verbal.

## 2017-11-27 NOTE — PROGRESS NOTES
TM left for client's daughter Daisha ([phone]) requesting a call back to discuss client's recent hospital discharge.  Kecia Gaspar RN, BSN, PHN  [facility]   [phone]

## 2017-11-27 NOTE — PROGRESS NOTES
Dates of hospitalization: 11/22 to 11/26  Reason for hospitalization: abdominal pain and concern for pump thrombus.  Procedures performed: -  Vitamin K or FFP administered? no  Inpatient warfarin doses added to calendar? yes  Medication changes at discharge: Started on Zantac, and D/Clif[place] Allopurinol, Lipitor, Losartan, Mag-ox and Protonix.  Warfarin dosing after DC: 4.5mg  Patient discharged on Lovenox? no  Next INR date: 11/27  Where is the patient discharging to? (home, TCU, staying locally, etc.): home  Will patient have home care? yes

## 2017-11-27 NOTE — TELEPHONE ENCOUNTER
This patient was referred for a SELMA. Please have the complex care team schedule with [place] pharmacist Joann Muse.    Thanks,  Staci Colón [facility] Coordinator

## 2017-11-27 NOTE — MR AVS SNAPSHOT
Sohan [facility]   11/27/2017   Anticoagulation Therapy Visit    Description:  67 year old male   Provider:  Dafne Sanders, RN   Department:  [facility]           INR as of 11/27/2017     Today's INR No new INR was available at the time of this encounter.      Anticoagulation Summary as of 11/27/2017     INR goal    Prior goal 1.8-2.5   Today's INR No new INR was available at the time of this encounter.   Full instructions 3 mg on Mon; 4.5 mg all other days   Next INR check 11/27/2017    Indications   LVAD (left ventricular assist device) present [Z95.811]  Long-term (current) use of anticoagulants [Z79.01] [Z79.01]         November 2017 Details    Sun Mon Tue Wed Thu Fri Sat        1               2               3               4                 5               6               7               8               9               10               11                 12               13               14               15               16               17               18                 19               20               21               22               23               24               25                 26               27      See details      28               29               30                  Date Details   11/27 This INR check       Date of next INR:  11/27/2017         How to take your warfarin dose     To take:  3 mg Take 1 of the 3 mg tablets.

## 2017-11-27 NOTE — PROGRESS NOTES
TC from client's daughter Linnette ([phone]). Discussed client. She stated that he is doing good and has all the medications he needs. He does not have a F/U with his PCP. Apparently the clinic closed but another doctor is going to see client. She was unsure who that was. No other needs identified at this time. Encouraged her to call if anything comes up.  Kecia Gaspar RN, BSN, PHN  [facility]   [phone]

## 2017-11-27 NOTE — MR AVS SNAPSHOT
Sohan [facility]   11/27/2017   Anticoagulation Therapy Visit    Description:  67 year old male   Provider:  Sarah Osborne, RN   Department:  [facility]           INR as of 11/27/2017     Today's INR 2.3      Anticoagulation Summary as of 11/27/2017     INR goal    Prior goal 1.8-2.5   Today's INR 2.3   Full instructions 3 mg on Mon; 4.5 mg all other days   Next INR check 12/4/2017    Indications   LVAD (left ventricular assist device) present [Z95.811]  Long-term (current) use of anticoagulants [Z79.01] [Z79.01]         November 2017 Details    Sun Mon Tue Wed Thu Fri Sat        1               2               3               4                 5               6               7               8               9               10               11                 12               13               14               15               16               17               18                 19               20               21               22               23               24               25                 26               27      3 mg   See details      28      4.5 mg         29      4.5 mg         30      4.5 mg            Date Details   11/27 This INR check               How to take your warfarin dose     To take:  3 mg Take 1 of the 3 mg tablets.    To take:  4.5 mg Take 1.5 of the 3 mg tablets.           December 2017 Details    Sun Mon Tue Wed Thu Fri Sat          1      4.5 mg         2      4.5 mg           3      4.5 mg         4            5               6               7               8               9                 10               11               12               13               14               15               16                 17               18               19               20               21               22               23                 24               25               26               27               28               29               30                 31                      Date  Details   No additional details    Date of next INR:  12/4/2017         How to take your warfarin dose     To take:  3 mg Take 1 of the 3 mg tablets.    To take:  4.5 mg Take 1.5 of the 3 mg tablets.

## 2017-11-27 NOTE — PROGRESS NOTES
ANTICOAGULATION FOLLOW-UP CLINIC VISIT    Patient Name:  Sohan Rendon  Date:  11/27/2017  Contact Type:  Telephone    SUBJECTIVE:     Patient Findings     Positives Hospital admission (11/22/17 - 11/26/17 for abdominal pain and concern for pump thrombosis)           OBJECTIVE    INR   Date Value Ref Range Status   11/27/2017 2.3  Final     Chromogenic Factor 10   Date Value Ref Range Status   08/10/2014 24 (L) 70 - 130 % Final     Comment:     Therapeutic Range:  A Chromogenic Factor 10 level of approximately 20-40%   inversely correlates with an INR of 2-3 for patients receiving Warfarin.   Chromogenic Factor 10 levels below 20% indicate an INR greater than 3 and   levels above 40% indicate an INR less than 2.       Factor 2 Assay   Date Value Ref Range Status   07/21/2014 25 (L) 60 - 140 % Final     Comment:     Analyte Specific Reagents (ASRs) are used in many laboratory tests necessary   for   standard medical care and generally do not require FDA approval.  This test   was   developed and its performance characteristics determined by [facility].  It has not been cleared or approved by   the US Food and Drug Administration.         ASSESSMENT / PLAN  INR assessment THER    Recheck INR In: 1 WEEK    INR Location [facility]      Anticoagulation Summary as of 11/27/2017     INR goal    Prior goal 1.8-2.5   Today's INR 2.3   Maintenance plan 3 mg (3 mg x 1) on Mon; 4.5 mg (3 mg x 1.5) all other days   Full instructions 3 mg on Mon; 4.5 mg all other days   Weekly total 30 mg   No change documented Sarah Osborne RN   Plan last modified Blanca Bah RN (10/23/2017)   Next INR check 12/4/2017   Priority INR   Target end date Indefinite    Indications   LVAD (left ventricular assist device) present [Z95.811]  Long-term (current) use of anticoagulants [Z79.01] [Z79.01]         Anticoagulation Episode Summary     INR check location     Preferred lab     Send  INR reminders to [facility]    Comments Goal Range is 1.8-2.3  [facility] comes out to draw INR  Sofya Ph [phone]  Daughter Almaz Ph [phone]      Anticoagulation Care Providers     Provider Role Specialty Phone number    Evon Lemons MD Responsible Cardiology [phone]            See the Encounter Report to view Anticoagulation Flowsheet and Dosing Calendar (Go to Encounters tab in chart review, and find the Anticoagulation Therapy Visit)    Spoke with Yancy MCKINNON.      Sarah Osborne RN

## 2017-11-29 NOTE — TELEPHONE ENCOUNTER
Pt will transition to  geriatric services through  [facility] in January. Will discuss with [facility] staff if PCP will be able to schedule hospital f/u visit prior to transitioning care. Pt is currently only scheduled with cardiology.   Due to limitations with my present schedule at [facility] and transition away from home visits next week, I will not be able to make a home MTM visit for hospital f/u. Will have the staff contact pt's daughters to request a phone visit to review medication changes.     Joann Muse, LatoyaD, BCACP

## 2017-12-01 NOTE — TELEPHONE ENCOUNTER
Received message from SHAWN Torres Clinical Coordinator [facility] requesting ROCHELLE orders.    Gave verbal ok on order requested per RN protocol.    Concetta Burr RN

## 2017-12-04 NOTE — PROGRESS NOTES
ANTICOAGULATION FOLLOW-UP CLINIC VISIT    Patient Name:  Sohan Rendon  Date:  12/4/2017  Contact Type:  Telephone    SUBJECTIVE:     Patient Findings     Comments Patient has stopped Allopurinol, Protonix, Lipitor, and Cozaar on 11/26.  This has been a week since this medications have stopped and INR has maintained the same.  Will continue same dose.           OBJECTIVE    INR   Date Value Ref Range Status   12/04/2017 2.3  Final     Chromogenic Factor 10   Date Value Ref Range Status   08/10/2014 24 (L) 70 - 130 % Final     Comment:     Therapeutic Range:  A Chromogenic Factor 10 level of approximately 20-40%   inversely correlates with an INR of 2-3 for patients receiving Warfarin.   Chromogenic Factor 10 levels below 20% indicate an INR greater than 3 and   levels above 40% indicate an INR less than 2.       Factor 2 Assay   Date Value Ref Range Status   07/21/2014 25 (L) 60 - 140 % Final     Comment:     Analyte Specific Reagents (ASRs) are used in many laboratory tests necessary   for   standard medical care and generally do not require FDA approval.  This test   was   developed and its performance characteristics determined by [facility].  It has not been cleared or approved by   the US Food and Drug Administration.         ASSESSMENT / PLAN  INR assessment THER    Recheck INR In: 1 WEEK    INR Location [facility]      Anticoagulation Summary as of 12/4/2017     INR goal    Prior goal 1.8-2.5   Today's INR 2.3   Maintenance plan 3 mg (3 mg x 1) on Mon; 4.5 mg (3 mg x 1.5) all other days   Full instructions 3 mg on Mon; 4.5 mg all other days   Weekly total 30 mg   Plan last modified Blanca Bah RN (10/23/2017)   Next INR check 12/11/2017   Priority INR   Target end date Indefinite    Indications   LVAD (left ventricular assist device) present [Z95.811]  Long-term (current) use of anticoagulants [Z79.01] [Z79.01]         Anticoagulation Episode Summary      INR check location     Preferred lab     Send INR reminders to [facility]    Comments Goal Range is 1.8-2.3  [facility] comes out to draw INR  Sofya Ph [phone]  Daughter Almaz Ph [phone]      Anticoagulation Care Providers     Provider Role Specialty Phone number    Evon Lemons MD Responsible Cardiology [phone]            See the Encounter Report to view Anticoagulation Flowsheet and Dosing Calendar (Go to Encounters tab in chart review, and find the Anticoagulation Therapy Visit)    Spoke with Radha MCKINNON.    Blanca Bah RN     Radha called to report that a nurse is scheduled to see patient on 12/12 and would like the INR done then.  Radha also reports that Sohan had a small nose bleed that last only a few minutes.  She suggested that patient use saline nasal spray and petroleum jelly.  Daughter will call home care office if there is further problems.

## 2017-12-04 NOTE — MR AVS SNAPSHOT
Sohan [facility]   12/4/2017   Anticoagulation Therapy Visit    Description:  67 year old male   Provider:  Blanca Bah, RN   Department:  [facility]           INR as of 12/4/2017     Today's INR 2.3      Anticoagulation Summary as of 12/4/2017     INR goal    Prior goal 1.8-2.5   Today's INR 2.3   Full instructions 3 mg on Mon; 4.5 mg all other days   Next INR check 12/11/2017    Indications   LVAD (left ventricular assist device) present [Z95.811]  Long-term (current) use of anticoagulants [Z79.01] [Z79.01]         December 2017 Details    Sun Mon Tue Wed Thu Fri Sat          1               2                 3               4      3 mg   See details      5      4.5 mg         6      4.5 mg         7      4.5 mg         8      4.5 mg         9      4.5 mg           10      4.5 mg         11            12               13               14               15               16                 17               18               19               20               21               22               23                 24               25               26               27               28               29               30                 31                      Date Details   12/04 This INR check       Date of next INR:  12/11/2017         How to take your warfarin dose     To take:  3 mg Take 1 of the 3 mg tablets.    To take:  4.5 mg Take 1.5 of the 3 mg tablets.

## 2017-12-05 NOTE — MR AVS SNAPSHOT
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 OK to use up to 2 tablets of Senna-S in the morning and 2 tablets at night (total of 4 pills per day).  Continue using bisacodyl suppository as needed    Next MTM visit: Please follow-up with [facility] in January. They also have a pharmacist, Yancy Ziegler, who can help with medication related issues as needed.    To schedule another MTM appointment, please call the clinic directly or you may call the MTM scheduling line at [phone] or toll-free at [phone].     My Clinical Pharmacist's contact information:                                                      It was a pleasure seeing you today!  Please feel free to contact me with any questions or concerns you have.      Joann Muse, PharmD, BCACP     You may receive a survey about the MT services you received.  I would appreciate your feedback to help me serve you better in the future. Please fill it out and return it when you can. Your comments will be anonymous.                  Follow-ups after your visit        Your next 10 appointments already scheduled     Dec 13, 2017 10:00 AM CST   (Arrive by 9:45 AM)   Implanted  "Defibulator with Uc Cv Device 1   [facility]    [address] [address]   [phone]            Dec 13, 2017 10:40 AM CST   (Arrive by 10:25 AM)   Ventricular Assist Device with Evon Lemons MD   [facility] ([facility])    [address] [address]   [phone]            Mar 09, 2018 10:30 AM CST   (Arrive by 10:15 AM)   Implanted Defibulator with Uc Cv Device 1   [facility] ([facility])    [address] [address] [phone]            Mar 09, 2018 11:00 AM CST   (Arrive by 10:45 AM)   Ventricular Assist Device with CLAIRE Escobar CNP   [facility] ([facility])    [address] [place] [id number] [phone]              Who to contact     If you have questions or need follow up information about today's clinic visit or your schedule please contact [facility] directly at [phone].  Normal or non-critical lab and imaging results will be communicated to you by MyChart, letter or phone within 4 business days after the clinic has received the results. If you do not hear from us within 7 days, please contact the clinic through MyChart or phone. If you have a critical or abnormal lab result, we will notify you by phone as soon as possible.  Submit refill requests through [facility] or call your pharmacy and they will forward the refill request to us. Please allow 3 business days for your refill to be completed.          Additional Information About Your Visit        Reward[employer] Information     [facility] lets you send messages to your doctor, view your test results, renew your prescriptions, schedule appointments and more. To sign up, go to [email or web address] . Click on \"Log in\" on the " "left side of the screen, which will take you to the Welcome page. Then click on \"Sign up Now\" on the right side of the page.     You will be asked to enter the access code listed below, as well as some personal information. Please follow the directions to create your username and password.     Your access code is: [email or web address]  Expires: 2017  3:12 PM     Your access code will  in 90 days. If you need help or a new code, please call your [facility] clinic or [phone].        Care EveryWhere ID     This is your Care EveryWhere ID. This could be used by other organizations to access your [facility] medical records  [id number]         Blood Pressure from Last 3 Encounters:   17 98/85   17 90/65   17 96/66    Weight from Last 3 Encounters:   17 185 lb 10 oz (84.2 kg)   17 180 lb (81.6 kg)   17 170 lb (77.1 kg)              Today, you had the following     No orders found for display         Today's Medication Changes          These changes are accurate as of: 17  4:40 PM.  If you have any questions, ask your nurse or doctor.               These medicines have changed or have updated prescriptions.        Dose/Directions    acetaminophen 325 MG tablet   Commonly known as:  TYLENOL   This may have changed:    - when to take this  - reasons to take this        Dose:  650 mg   Take 2 tablets (650 mg) by mouth every 6 hours as needed for mild pain   Quantity:  100 tablet   Refills:  0       gabapentin 100 MG capsule   Commonly known as:  NEURONTIN   This may have changed:    - when to take this  - reasons to take this   Used for:  Left foot pain        Dose:  100 mg   Take 1 capsule (100 mg) by mouth 2 times daily as needed   Quantity:  60 capsule   Refills:  3       simethicone 80 MG chewable tablet   Commonly known as:  MYLICON   This may have changed:    - when to take this  - reasons to take this   Used for:  Slow transit constipation        Dose:  80 mg   Take 1 " tablet (80 mg) by mouth 4 times daily   Quantity:  180 tablet   Refills:  5                Primary Care Provider Office Phone # Fax #    Thomas Dill [phone]       [address] [facility] [address]  [facility] [address]        Equal Access to Services     ISAUROGIULIANA BRODY : Hadii aad ku hadpepeo Soomaali, waaxda luqadaha, qaybta kaalmada adeegyada, waxandrea lilliein hellenn adeshaggy cabrales lanakitakilo . So [facility] [phone].    ATENCIÓN: Si habla español, tiene a smith disposición servicios gratuitos de asistencia lingüística. Llame al [phone].    We comply with applicable federal civil rights laws and [place] laws. We do not discriminate on the basis of race, color, national origin, age, disability, sex, sexual orientation, or gender identity.            Thank you!     Thank you for choosing [facility]TM  for your care. Our goal is always to provide you with excellent care. Hearing back from our patients is one way we can continue to improve our services. Please take a few minutes to complete the written survey that you may receive in the mail after your visit with us. Thank you!             Your Updated Medication List - Protect others around you: Learn how to safely use, store and throw away your medicines at www.disposemymeds.org.          This list is accurate as of: 12/5/17  4:40 PM.  Always use your most recent med list.                   Brand Name Dispense Instructions for use Diagnosis    acetaminophen 325 MG tablet    TYLENOL    100 tablet    Take 2 tablets (650 mg) by mouth every 6 hours as needed for mild pain        ALEK CONTOUR test strip   Generic drug:  blood glucose monitoring     100 strip    USE TO TEST BLOOD SUGAR 2 TIMES DAILY OR AS DIRECTED.    Hypoglycemia       bisacodyl 10 MG Suppository    DULCOLAX    5 suppository    Place 1 suppository (10 mg) rectally daily as needed for constipation    Drug-induced constipation       blood glucose monitoring  lancets     1 Box    Use to test blood sugars 2 times daily or as directed.    Diabetes mellitus, type 2 (H)       calcium carbonate-vitamin D 500-400 MG-UNIT Tabs per tablet     90 tablet    Take 1 tablet by mouth daily    Cardiac arrhythmia, unspecified cardiac arrhythmia type       finasteride 5 MG tablet    PROSCAR    90 tablet    Take 1 tablet (5 mg) by mouth daily    Incomplete bladder emptying       furosemide 20 MG tablet    LASIX    20 tablet    Take 1 tablet (20 mg) by mouth as needed    Chronic systolic congestive heart failure (H)       gabapentin 100 MG capsule    NEURONTIN    60 capsule    Take 1 capsule (100 mg) by mouth 2 times daily as needed    Left foot pain       ketotifen 0.025 % Soln ophthalmic solution    ZADITOR/REFRESH ANTI-ITCH    1 Bottle    Place 1 drop into both eyes 2 times daily    Allergic conjunctivitis, bilateral       levETIRAcetam 750 MG tablet    KEPPRA    180 tablet    TAKE 1 TABLET (750 MG) BY MOUTH 2 TIMES DAILY    Convulsions, unspecified convulsion type (H)       loratadine 10 MG tablet    CLARITIN    90 tablet    TAKE 1 TABLET (10 MG) BY MOUTH DAILY    Allergic rhinitis       melatonin 3 MG tablet      Take 1 tablet (3 mg) by mouth At Bedtime    Insomnia, unspecified type       nitroGLYcerin 0.4 MG sublingual tablet    NITROSTAT    30 tablet    Place 1 tablet (0.4 mg) under the tongue every 5 minutes as needed for chest pain    Coronary artery disease involving native coronary artery with unstable angina pectoris (H)       nystatin 347605 UNIT/GM Powd    MYCOSTATIN    60 g    Apply to affected areas of skin in the groin and on the scrotum three times a day as needed.    Intertriginous dermatitis associated with moisture       * order for DME     1 Device    Equipment being ordered: Lift chair.  Please see indications and face-to-face encounter details in 2/3/15 Office Visit note.    Fracture of femoral neck, right, closed, initial encounter, Stroke (H), CHF (congestive  heart failure), NYHA class IV (H)       * order for DME     2 each    Equipment being ordered: Bilateral leg supports for manual wheelchair.    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  Please fax to [facility]; I called them 11/26/16 and they requested the new order.  Face to face is in my 10/26/16 note.    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  Dx: previous stroke with left hemiplegia.  Duration of need: 99 months.    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, subsequent encounter       ranitidine 75 MG tablet    ZANTAC    30 tablet    Take 1 tablet (75 mg) by mouth At Bedtime    Gastroesophageal reflux disease without esophagitis       SENEXON-S 8.6-50 MG per tablet   Generic drug:  senna-docusate     60 tablet    TAKE 1-2 TABLETS BY MOUTH 2 TIMES DAILY AS NEEDED FOR CONSTIPATION    Slow transit constipation       simethicone 80 MG chewable tablet    MYLICON    180 tablet    Take 1 tablet (80 mg) by mouth 4 times daily    Slow transit constipation       tamsulosin 0.4 MG capsule    FLOMAX    90 capsule    TAKE 1 CAPSULE EVERY DAY    Incomplete bladder emptying       thiamine 100 MG tablet     90 tablet    TAKE 1 TABLET (100 MG) BY MOUTH DAILY    Cerebrovascular accident (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 Take 25mg by mouth every evening.        warfarin 3 MG tablet    COUMADIN     Take 4.5 mg by mouth daily        * Notice:  This list has 8 medication(s) that are the same as other medications prescribed for you. Read the directions carefully, and ask your doctor or other care provider to review them with you.

## 2017-12-05 NOTE — PATIENT INSTRUCTIONS
Recommendations from today's MTM visit:                                                        1. OK to use up to 2 tablets of Senna-S in the morning and 2 tablets at night (total of 4 pills per day).  Continue using bisacodyl suppository as needed    Next MTM visit: Please follow-up with [facility] in January. They also have a pharmacist, Yancy Ziegler, who can help with medication related issues as needed.    To schedule another MTM appointment, please call the clinic directly or you may call the MTM scheduling line at [phone] or toll-free at [phone].     My Clinical Pharmacist's contact information:                                                      It was a pleasure seeing you today!  Please feel free to contact me with any questions or concerns you have.      Joann Muse, PharmD, BCACP     You may receive a survey about the MTM services you received.  I would appreciate your feedback to help me serve you better in the future. Please fill it out and return it when you can. Your comments will be anonymous.

## 2017-12-05 NOTE — PROGRESS NOTES
SUBJECTIVE/OBJECTIVE:                Sohan Rendon is a 67 year old male called for a transitions of care visit. Spoke with daughter Antionette for visit per pt request. He was discharged from [facility] on 11/26/17 for hemoglobinuria.     Chief Complaint: hospital f/u, no concerns.    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 No LVAD alarms or hemodynamic changes on admission.  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 Multiple discussion with family in the past that options were limited if his thrombosis were to worsen and have a hemodynamic impact. He is not a candidate for pump exchange. The family voiced understanding of the plan to achieve as much as possible without any escalation of care. They requested we discontinue medications that are not vital.  The below are his medication changes at discharge. In the past, DNR/DNI status has been discussed, but it appears the family has requested a full code status on presentation. This discussion will continue as an outpatient.    Continue metoprolol succinate 50 mg qAM and 25mg qHS  --D/c Losartan with waxing and waning kidney function  -- Anticoagulation: continue warfarin. Goal 1.8-2.5  Seizures s/p Multiple CVAs: Continue keppra 750 mg BID  BPH: tamsulosin and finasteride  HLD: d/c atorvastatin  Gout: d/c allopurinol  GERD: D/c pantoprazole per family request - Ranitidine 75 mg daily  Insomnia: melatonin  - If the patient were to have any further hemolysis/hematuria and other concerns for worsening pump thrombosis: options to treat this are limited. Further anticoagulation with heparin gtt  "have not been helpful and focus should be placed on symptom control.     Ischemic cardiomyopathy s/p LVAD: currently taking warfarin, INR drawn by HCRN and managed by [facility] anticoag service. Urine has cleared, no concerns at this time. Has discontinued losartan and continues on metoprolol without problems.     GERD: pantoprazole d/c'd during hospitalization, ranitidine started and controling symptoms well enough. Gives simethicone PRN which seems to help heartburn as well.     constipation: currently taking senna once daily. Needs to use bisacodyl supp about every 3 weeks, which works well.     Gout: no longer taking allopurinol. No current sx of gout. Thinks last gout flare <1 yr ago.     Hx seizure: taking Keppra 750mg BID without problems. No seizure activity noted.     BPH: currently taking finasteride and tamsulosin. Denies SE. No urinary complaints.     Neuropathy: taking gabapentin as PRN when pt has bothersome \"pinch\" in his legs. Working well as PRN, hasn't needed to take any since hospitalization and uses sparingly.       Current labs include:  BP Readings from Last 3 Encounters:   11/26/17 98/85   11/17/17 90/65   11/14/17 96/66     Today's Vitals: There were no vitals taken for this visit.  - phone visit  Lab Results   Component Value Date    A1C 4.8 08/04/2017   .  Lab Results   Component Value Date    CHOL 138 06/22/2017     Lab Results   Component Value Date    TRIG 143 06/22/2017     Lab Results   Component Value Date    HDL 29 06/22/2017     Lab Results   Component Value Date    LDL 80 06/22/2017       Liver Function Studies -   Recent Labs   Lab Test  11/26/17   0955   PROTTOTAL  7.6   ALBUMIN  3.3*   BILITOTAL  1.5*   ALKPHOS  130   AST  Unsatisfactory specimen - hemolyzed   ALT  38       Lab Results   Component Value Date    UCRR 41 11/20/2017       Last Basic Metabolic Panel:  Lab Results   Component Value Date     11/26/2017      Lab Results   Component Value Date    POTASSIUM 4.3 " 11/26/2017     Lab Results   Component Value Date    CHLORIDE 109 11/26/2017     Lab Results   Component Value Date    BUN 28 11/26/2017     Lab Results   Component Value Date    CR 2.55 11/26/2017     GFR Estimate   Date Value Ref Range Status   11/26/2017 25 (L) >60 mL/min/1.7m2 Final     Comment:     Non  GFR Calc   11/25/2017 24 (L) >60 mL/min/1.7m2 Final     Comment:     Non  GFR Calc   11/24/2017 25 (L) >60 mL/min/1.7m2 Final     Comment:     Non  GFR Calc     GFR Estimate If Black   Date Value Ref Range Status   11/26/2017 31 (L) >60 mL/min/1.7m2 Final     Comment:      GFR Calc   11/25/2017 29 (L) >60 mL/min/1.7m2 Final     Comment:      GFR Calc   11/24/2017 30 (L) >60 mL/min/1.7m2 Final     Comment:      GFR Calc     TSH   Date Value Ref Range Status   08/10/2015 1.32 0.40 - 4.00 mU/L Final   ]    Most Recent Immunizations   Administered Date(s) Administered     Influenza (IIV3) PF 10/08/2015     Influenza Vaccine IM 3yrs+ 4 Valent IIV4 10/01/2017     Pneumococcal 23 valent 08/17/2014       ASSESSMENT:                 Current medications were reviewed today.      Medication Adherence: no issues identified    Ischemic cardiomyopathy s/p LVAD: stable. INR remains at goal 0.8-2.5. Will continue having [facility] complete INR checks after closure of [facility].   GERD: stable off PPI. Continue ranitidine  constipation: improved, reviewed instructions for senna to increase up to 4 tabs/day if needed in order to reduce need for bisacodyl.   gout: stable. Monitor off allopurinol  seizure: stable  BPH: stable  Neuropathy: stable, ok to continue gabapentin as PRN     PLAN:                Use up to senna S 2 tabs BID as prescribed    I spent 20 minutes with this patient today  . All changes were made via collaborative practice agreement with Thomas Dill. A copy of the visit note was provided to the patient's  primary care provider.    Will follow up as needed. Pt will be transitioned to  Geriatric services for ongoing primary care after 1/1/18.    The patient was mailed a summary of these recommendations as an after visit summary.    Joann Muse, PharmD, BCACP

## 2017-12-07 NOTE — TELEPHONE ENCOUNTER
Call from daughter Antionette, requesting call back from pharmD regarding medication question.    The hospital told her to give her father the ranitidine before bedtime, but she would like to dispense it in the morning before he eats breakfast.    Please advise.  Call back to [phone]

## 2017-12-07 NOTE — TELEPHONE ENCOUNTER
Returned call to daughter, OK to dispense in the AM.  Also asked if he could take a 2nd dose at night PRN, which is OK. With CKD, ranitidine recommended dose no more than 150mg/day. OK for pt to take with or without food.    Joann Muse, PharmD, BCACP

## 2017-12-12 NOTE — PROGRESS NOTES
ANTICOAGULATION FOLLOW-UP CLINIC VISIT    Patient Name:  Sohan Rendon  Date:  12/12/2017  Contact Type:  Telephone    SUBJECTIVE:     Patient Findings     Positives Other complaints (Stomach discomfort related to constipation X2 days.  Bloated and has acid reflux.)    Comments No further issues with nosebleeds.    Almaz said that patient has been taking Coumadin 4.5mg daily recently , instead of 3mg on Mondays.  Corrected the computer calendar.           OBJECTIVE    INR   Date Value Ref Range Status   12/12/2017 2.14 (H) 0.86 - 1.14 Final     Chromogenic Factor 10   Date Value Ref Range Status   08/10/2014 24 (L) 70 - 130 % Final     Comment:     Therapeutic Range:  A Chromogenic Factor 10 level of approximately 20-40%   inversely correlates with an INR of 2-3 for patients receiving Warfarin.   Chromogenic Factor 10 levels below 20% indicate an INR greater than 3 and   levels above 40% indicate an INR less than 2.       Factor 2 Assay   Date Value Ref Range Status   07/21/2014 25 (L) 60 - 140 % Final     Comment:     Analyte Specific Reagents (ASRs) are used in many laboratory tests necessary   for   standard medical care and generally do not require FDA approval.  This test   was   developed and its performance characteristics determined by [facility].  It has not been cleared or approved by   the US Food and Drug Administration.         ASSESSMENT / PLAN  INR assessment THER    Recheck INR In: 1 WEEK    INR Location Clinic      Anticoagulation Summary as of 12/12/2017     INR goal    Prior goal 1.8-2.5   Today's INR 2.14   Maintenance plan 4.5 mg (3 mg x 1.5) every day   Full instructions 12/18: 4.5 mg; Otherwise 4.5 mg every day   Weekly total 31.5 mg   Plan last modified Dafne Sanders RN (12/12/2017)   Next INR check 12/19/2017   Priority INR   Target end date Indefinite    Indications   LVAD (left ventricular assist device) present [Z95.811]  Long-term  (current) use of anticoagulants [Z79.01] [Z79.01]         Anticoagulation Episode Summary     INR check location     Preferred lab     [facility]    Comments Goal Range is 1.8-2.3  [facility] comes out to draw INR  Sofya Ph [phone]  Daughter Almaz Ph [phone]      Anticoagulation Care Providers     Provider Role Specialty Phone number    Evon Lemons MD Responsible Cardiology [phone]            See the Encounter Report to view Anticoagulation Flowsheet and Dosing Calendar (Go to Encounters tab in chart review, and find the Anticoagulation Therapy Visit)    Spoke with Almaz.    Dafne Sanders RN

## 2017-12-12 NOTE — MR AVS SNAPSHOT
Sohan eRndon   12/12/2017   Anticoagulation Therapy Visit    Description:  67 year old male   Provider:  Dafne Sanders, RN   Department:  [facility]           INR as of 12/12/2017     Today's INR 2.14      Anticoagulation Summary as of 12/12/2017     INR goal    Prior goal 1.8-2.5   Today's INR 2.14   Full instructions 12/18: 4.5 mg; Otherwise 4.5 mg every day   Next INR check 12/19/2017    Indications   LVAD (left ventricular assist device) present [Z95.811]  Long-term (current) use of anticoagulants [Z79.01] [Z79.01]         December 2017 Details    Sun Mon Tue Wed Thu Fri Sat          1               2                 3               4               5               6               7               8               9                 10               11               12      4.5 mg   See details      13      4.5 mg         14      4.5 mg         15      4.5 mg         16      4.5 mg           17      4.5 mg         18      4.5 mg         19            20               21               22               23                 24               25               26               27               28               29               30                 31                      Date Details   12/12 This INR check       Date of next INR:  12/19/2017         How to take your warfarin dose     To take:  4.5 mg Take 1.5 of the 3 mg tablets.    To take:  4.5 mg Take 1 of the 3 mg tablets and 1.5 of the 1 mg tablets.

## 2017-12-14 NOTE — TELEPHONE ENCOUNTER
Reason for Call:  Orders for skilled nursing visits     Detailed comments: Taylor with [facility] need verbal ok for nursing visits:  Once a week X 9 weeks  3 as needed    Phone Number Patient can be reached at: Other phone number:  Taylor  [phone]    Best Time: anytime    Can we leave a detailed message on this number? YES    Call taken on 12/14/2017 at 2:45 PM by Shemar Barnes

## 2017-12-14 NOTE — TELEPHONE ENCOUNTER
VM left approving continuation of skilled nursing visits.  Fax number and triage line provided. Martina Arevalo RN December 14, 2017 3:41 PM

## 2017-12-18 NOTE — TELEPHONE ENCOUNTER
Prescription approved per [facility] Refill Protocol.    Anita Steen, KRISTAN RN

## 2017-12-19 NOTE — PROGRESS NOTES
ANTICOAGULATION FOLLOW-UP CLINIC VISIT    Patient Name:  Sohan Rendon  Date:  12/19/2017  Contact Type:  Telephone    SUBJECTIVE:     Patient Findings     Comments Sofya is unsure of any changes.  Sofya reports that Almaz has been giving him 3mg Mon and 4.5mg all other days.           OBJECTIVE    INR   Date Value Ref Range Status   12/19/2017 2.6  Final     Chromogenic Factor 10   Date Value Ref Range Status   08/10/2014 24 (L) 70 - 130 % Final     Comment:     Therapeutic Range:  A Chromogenic Factor 10 level of approximately 20-40%   inversely correlates with an INR of 2-3 for patients receiving Warfarin.   Chromogenic Factor 10 levels below 20% indicate an INR greater than 3 and   levels above 40% indicate an INR less than 2.       Factor 2 Assay   Date Value Ref Range Status   07/21/2014 25 (L) 60 - 140 % Final     Comment:     Analyte Specific Reagents (ASRs) are used in many laboratory tests necessary   for   standard medical care and generally do not require FDA approval.  This test   was   developed and its performance characteristics determined by [facility].  It has not been cleared or approved by   the US Food and Drug Administration.         ASSESSMENT / PLAN  INR assessment THER    Recheck INR In: 1 WEEK    INR Location Homecare INR      Anticoagulation Summary as of 12/19/2017     INR goal    Prior goal 1.8-2.5   Today's INR 2.6!   Maintenance plan 3 mg (3 mg x 1) on Mon; 4.5 mg (3 mg x 1.5) all other days   Full instructions 12/19: 3 mg; Otherwise 3 mg on Mon; 4.5 mg all other days   Weekly total 30 mg   Plan last modified Blanca Bah RN (12/19/2017)   Next INR check 12/26/2017   Priority INR   Target end date Indefinite    Indications   LVAD (left ventricular assist device) present [Z95.811]  Long-term (current) use of anticoagulants [Z79.01] [Z79.01]         Anticoagulation Episode Summary     INR check location     Preferred lab     Send  [facility] reminders to [facility]    Comments Goal Range is 1.8-2.3  [facility] comes out to draw INR  Sofya Ph [phone]  Daughter Almaz Ph [phone]      Anticoagulation Care Providers     Provider Role Specialty Phone number    Evon Lemons MD Responsible Cardiology [phone]            See the Encounter Report to view Anticoagulation Flowsheet and Dosing Calendar (Go to Encounters tab in chart review, and find the Anticoagulation Therapy Visit)    Spoke with Sofya.    Blanca Bah, RN

## 2017-12-19 NOTE — MR AVS SNAPSHOT
Sohan Rendon   12/19/2017   Anticoagulation Therapy Visit    Description:  67 year old male   Provider:  Blanca Bah, RN   Department:  [facility]           INR as of 12/19/2017     Today's INR 2.6!      Anticoagulation Summary as of 12/19/2017     INR goal    Prior goal 1.8-2.5   Today's INR 2.6!   Full instructions 12/19: 3 mg; Otherwise 3 mg on Mon; 4.5 mg all other days   Next INR check 12/26/2017    Indications   LVAD (left ventricular assist device) present [Z95.811]  Long-term (current) use of anticoagulants [Z79.01] [Z79.01]         December 2017 Details    Sun Mon Tue Wed Thu Fri Sat          1               2                 3               4               5               6               7               8               9                 10               11               12               13               14               15               16                 17               18               19      3 mg   See details      20      4.5 mg         21      4.5 mg         22      4.5 mg         23      4.5 mg           24      4.5 mg         25      3 mg         26            27               28               29               30                 31                      Date Details   12/19 This INR check       Date of next INR:  12/26/2017         How to take your warfarin dose     To take:  3 mg Take 1 of the 3 mg tablets.    To take:  4.5 mg Take 1.5 of the 3 mg tablets.

## 2017-12-21 NOTE — TELEPHONE ENCOUNTER
Allopurinol discontinued at discharge from the hospital 11/26/17; pharmacy notified. Martina Arevalo RN December 21, 2017 11:20 AM

## 2017-12-26 NOTE — TELEPHONE ENCOUNTER
Here is a note from Liz Quintana, Complex [facility] Clinic Administrator, from 11/20/17:    Lzi Quintana        11/20/17 1:24 PM   Note      I communicated with daughter, Antionette, that Sohan could continue to be seen in the home by a [facility] provider.   I let her know we would see him in January and would reach out to get that appointment scheduled.  Liz Quintana  Clinic Administrator        Will forward this to Ms. Quintana to see if she has an update regarding the [facility] referral.    Thanks.

## 2017-12-26 NOTE — MR AVS SNAPSHOT
Sohan [facility]   12/26/2017   Anticoagulation Therapy Visit    Description:  67 year old male   Provider:  James Nickerson RN   Department:  [facility]           INR as of 12/26/2017     Today's INR 2.6!      Anticoagulation Summary as of 12/26/2017     INR goal    Prior goal 1.8-2.5   Today's INR 2.6!   Full instructions 12/26: 3 mg; Otherwise 3 mg on Mon; 4.5 mg all other days   Next INR check 1/2/2018    Indications   LVAD (left ventricular assist device) present [Z95.811]  Long-term (current) use of anticoagulants [Z79.01] [Z79.01]         December 2017 Details    Sun Mon Tue Wed Thu Fri Sat          1               2                 3               4               5               6               7               8               9                 10               11               12               13               14               15               16                 17               18               19               20               21               22               23                 24               25               26      3 mg   See details      27      4.5 mg         28      4.5 mg         29      4.5 mg         30      4.5 mg           31      4.5 mg                Date Details   12/26 This INR check               How to take your warfarin dose     To take:  3 mg Take 1 of the 3 mg tablets.    To take:  4.5 mg Take 1.5 of the 3 mg tablets.           January 2018 Details    Sun Mon Tue Wed Thu Fri Sat      1      3 mg         2            3               4               5               6                 7               8               9               10               11               12               13                 14               15               16               17               18               19               20                 21               22               23               24               25               26               27                 28               29               30                31                   Date Details   No additional details    Date of next INR:  1/2/2018         How to take your warfarin dose     To take:  3 mg Take 1 of the 3 mg tablets.    To take:  4.5 mg Take 1.5 of the 3 mg tablets.

## 2017-12-26 NOTE — PROGRESS NOTES
ANTICOAGULATION FOLLOW-UP CLINIC VISIT    Patient Name:  Sohan Rendon  Date:  12/26/2017  Contact Type:  Telephone    SUBJECTIVE:     Patient Findings     Positives Unexplained INR or factor level change    Comments Spoke to INO Jackson.  Patient INR is slightly above goal range.  Recommended 3mg today, one time to get INR in middle of goal range.  Patient goal is 1.8-2.3.           OBJECTIVE    INR   Date Value Ref Range Status   12/26/2017 2.6  Final     Comment:     Home care INR draw     Chromogenic Factor 10   Date Value Ref Range Status   08/10/2014 24 (L) 70 - 130 % Final     Comment:     Therapeutic Range:  A Chromogenic Factor 10 level of approximately 20-40%   inversely correlates with an INR of 2-3 for patients receiving Warfarin.   Chromogenic Factor 10 levels below 20% indicate an INR greater than 3 and   levels above 40% indicate an INR less than 2.       Factor 2 Assay   Date Value Ref Range Status   07/21/2014 25 (L) 60 - 140 % Final     Comment:     Analyte Specific Reagents (ASRs) are used in many laboratory tests necessary   for   standard medical care and generally do not require FDA approval.  This test   was   developed and its performance characteristics determined by [facility].  It has not been cleared or approved by   the US Food and Drug Administration.         ASSESSMENT / PLAN  INR assessment SUPRA    Recheck INR In: 1 WEEK    INR Location [facility] INR      Anticoagulation Summary as of 12/26/2017     INR goal    Prior goal 1.8-2.5   Today's INR 2.6!   Maintenance plan 3 mg (3 mg x 1) on Mon; 4.5 mg (3 mg x 1.5) all other days   Full instructions 12/26: 3 mg; Otherwise 3 mg on Mon; 4.5 mg all other days   Weekly total 30 mg   Plan last modified Blanca Bah RN (12/19/2017)   Next INR check 1/2/2018   Priority INR   Target end date Indefinite    Indications   LVAD (left ventricular assist device) present [Z95.811]  Long-term  (current) use of anticoagulants [Z79.01] [Z79.01]         Anticoagulation Episode Summary     INR check location     Preferred lab     Send INR reminders to [facility]    Comments Goal Range is 1.8-2.3  [facility] comes out to draw INR  Sofya Ph [phone]  Daughter Alamz Ph [phone]      Anticoagulation Care Providers     Provider Role Specialty Phone number    Evon Lemons MD Responsible Cardiology [phone]            See the Encounter Report to view Anticoagulation Flowsheet and Dosing Calendar (Go to Encounters tab in chart review, and find the Anticoagulation Therapy Visit)    Spoke with INO Jackson. Gave them their lab results and new warfarin recommendation.  No changes in health, medication, or diet. No missed doses, no falls. No signs or symptoms of bleed or clotting.    James Nickerson, RN

## 2017-12-26 NOTE — TELEPHONE ENCOUNTER
Incoming call from pt's daughter wondering what the plan of care is for pt, since [facility] is closing.   Please follow up. Contact info: [phone], Kristen (Ok to leave detailed message)    Candy Alvarenga

## 2017-12-26 NOTE — TELEPHONE ENCOUNTER
Route to [facility] provider pool    Please see pt message and address    Alisha Dave RN   [facility]

## 2018-01-01 ENCOUNTER — DOCUMENTATION ONLY (OUTPATIENT)
Dept: CARE COORDINATION | Facility: CLINIC | Age: 68
End: 2018-01-01

## 2018-01-01 ENCOUNTER — CARE COORDINATION (OUTPATIENT)
Dept: GERIATRIC MEDICINE | Facility: CLINIC | Age: 68
End: 2018-01-01

## 2018-01-01 ENCOUNTER — ANTICOAGULATION THERAPY VISIT (OUTPATIENT)
Dept: ANTICOAGULATION | Facility: CLINIC | Age: 68
End: 2018-01-01

## 2018-01-01 ENCOUNTER — HOSPITAL ENCOUNTER (INPATIENT)
Facility: CLINIC | Age: 68
LOS: 2 days | Discharge: HOME HEALTH | DRG: 394 | End: 2018-05-03
Attending: EMERGENCY MEDICINE | Admitting: INTERNAL MEDICINE
Payer: COMMERCIAL

## 2018-01-01 ENCOUNTER — TELEPHONE (OUTPATIENT)
Dept: EMERGENCY MEDICINE | Facility: CLINIC | Age: 68
End: 2018-01-01

## 2018-01-01 ENCOUNTER — HOSPITAL ENCOUNTER (INPATIENT)
Facility: CLINIC | Age: 68
LOS: 6 days | Discharge: HOME HEALTH | DRG: 314 | End: 2018-04-17
Attending: EMERGENCY MEDICINE | Admitting: INTERNAL MEDICINE
Payer: COMMERCIAL

## 2018-01-01 ENCOUNTER — OFFICE VISIT (OUTPATIENT)
Dept: INTERPRETER SERVICES | Facility: CLINIC | Age: 68
End: 2018-01-01
Payer: COMMERCIAL

## 2018-01-01 ENCOUNTER — CARE COORDINATION (OUTPATIENT)
Dept: CARDIOLOGY | Facility: CLINIC | Age: 68
End: 2018-01-01

## 2018-01-01 ENCOUNTER — APPOINTMENT (OUTPATIENT)
Dept: OCCUPATIONAL THERAPY | Facility: CLINIC | Age: 68
DRG: 064 | End: 2018-01-01
Payer: COMMERCIAL

## 2018-01-01 ENCOUNTER — OFFICE VISIT (OUTPATIENT)
Dept: CARDIOLOGY | Facility: CLINIC | Age: 68
End: 2018-01-01
Attending: INTERNAL MEDICINE
Payer: COMMERCIAL

## 2018-01-01 ENCOUNTER — APPOINTMENT (OUTPATIENT)
Dept: GENERAL RADIOLOGY | Facility: CLINIC | Age: 68
End: 2018-01-01
Attending: EMERGENCY MEDICINE
Payer: COMMERCIAL

## 2018-01-01 ENCOUNTER — APPOINTMENT (OUTPATIENT)
Dept: SPEECH THERAPY | Facility: CLINIC | Age: 68
DRG: 690 | End: 2018-01-01
Payer: COMMERCIAL

## 2018-01-01 ENCOUNTER — DOCUMENTATION ONLY (OUTPATIENT)
Dept: CARDIOLOGY | Facility: CLINIC | Age: 68
End: 2018-01-01

## 2018-01-01 ENCOUNTER — TELEPHONE (OUTPATIENT)
Dept: GERIATRICS | Facility: CLINIC | Age: 68
End: 2018-01-01

## 2018-01-01 ENCOUNTER — APPOINTMENT (OUTPATIENT)
Dept: GENERAL RADIOLOGY | Facility: CLINIC | Age: 68
DRG: 690 | End: 2018-01-01
Attending: EMERGENCY MEDICINE
Payer: COMMERCIAL

## 2018-01-01 ENCOUNTER — HOME INFUSION (PRE-WILLOW HOME INFUSION) (OUTPATIENT)
Dept: PHARMACY | Facility: CLINIC | Age: 68
End: 2018-01-01

## 2018-01-01 ENCOUNTER — PATIENT OUTREACH (OUTPATIENT)
Dept: GERIATRIC MEDICINE | Facility: CLINIC | Age: 68
End: 2018-01-01

## 2018-01-01 ENCOUNTER — TELEPHONE (OUTPATIENT)
Dept: CARDIOLOGY | Facility: CLINIC | Age: 68
End: 2018-01-01

## 2018-01-01 ENCOUNTER — APPOINTMENT (OUTPATIENT)
Dept: SPEECH THERAPY | Facility: CLINIC | Age: 68
DRG: 064 | End: 2018-01-01
Attending: INTERNAL MEDICINE
Payer: COMMERCIAL

## 2018-01-01 ENCOUNTER — HOSPITAL ENCOUNTER (INPATIENT)
Facility: CLINIC | Age: 68
LOS: 2 days | Discharge: HOME HEALTH | DRG: 690 | End: 2018-07-12
Attending: EMERGENCY MEDICINE | Admitting: INTERNAL MEDICINE
Payer: COMMERCIAL

## 2018-01-01 ENCOUNTER — APPOINTMENT (OUTPATIENT)
Dept: SPEECH THERAPY | Facility: CLINIC | Age: 68
DRG: 064 | End: 2018-01-01
Attending: STUDENT IN AN ORGANIZED HEALTH CARE EDUCATION/TRAINING PROGRAM
Payer: COMMERCIAL

## 2018-01-01 ENCOUNTER — APPOINTMENT (OUTPATIENT)
Dept: CT IMAGING | Facility: CLINIC | Age: 68
DRG: 064 | End: 2018-01-01
Attending: STUDENT IN AN ORGANIZED HEALTH CARE EDUCATION/TRAINING PROGRAM
Payer: COMMERCIAL

## 2018-01-01 ENCOUNTER — ALLIED HEALTH/NURSE VISIT (OUTPATIENT)
Dept: NEUROLOGY | Facility: CLINIC | Age: 68
DRG: 064 | End: 2018-01-01
Attending: PSYCHIATRY & NEUROLOGY
Payer: COMMERCIAL

## 2018-01-01 ENCOUNTER — APPOINTMENT (OUTPATIENT)
Dept: SPEECH THERAPY | Facility: CLINIC | Age: 68
DRG: 064 | End: 2018-01-01
Attending: PSYCHIATRY & NEUROLOGY
Payer: COMMERCIAL

## 2018-01-01 ENCOUNTER — APPOINTMENT (OUTPATIENT)
Dept: CARDIOLOGY | Facility: CLINIC | Age: 68
DRG: 064 | End: 2018-01-01
Payer: COMMERCIAL

## 2018-01-01 ENCOUNTER — APPOINTMENT (OUTPATIENT)
Dept: GENERAL RADIOLOGY | Facility: CLINIC | Age: 68
DRG: 177 | End: 2018-01-01
Attending: EMERGENCY MEDICINE
Payer: COMMERCIAL

## 2018-01-01 ENCOUNTER — ALLIED HEALTH/NURSE VISIT (OUTPATIENT)
Dept: CARDIOLOGY | Facility: CLINIC | Age: 68
DRG: 064 | End: 2018-01-01
Attending: INTERNAL MEDICINE
Payer: COMMERCIAL

## 2018-01-01 ENCOUNTER — TELEPHONE (OUTPATIENT)
Dept: INTERNAL MEDICINE | Facility: CLINIC | Age: 68
End: 2018-01-01

## 2018-01-01 ENCOUNTER — HOSPITAL ENCOUNTER (EMERGENCY)
Facility: CLINIC | Age: 68
Discharge: HOME | DRG: 177 | End: 2018-07-13
Attending: EMERGENCY MEDICINE
Payer: COMMERCIAL

## 2018-01-01 ENCOUNTER — TELEPHONE (OUTPATIENT)
Dept: OTOLARYNGOLOGY | Facility: CLINIC | Age: 68
End: 2018-01-01

## 2018-01-01 ENCOUNTER — APPOINTMENT (OUTPATIENT)
Dept: CT IMAGING | Facility: CLINIC | Age: 68
End: 2018-01-01
Attending: EMERGENCY MEDICINE
Payer: COMMERCIAL

## 2018-01-01 ENCOUNTER — CARE COORDINATION (OUTPATIENT)
Dept: CARE COORDINATION | Facility: CLINIC | Age: 68
End: 2018-01-01

## 2018-01-01 ENCOUNTER — APPOINTMENT (OUTPATIENT)
Dept: GENERAL RADIOLOGY | Facility: CLINIC | Age: 68
DRG: 064 | End: 2018-01-01
Attending: INTERNAL MEDICINE
Payer: COMMERCIAL

## 2018-01-01 ENCOUNTER — APPOINTMENT (OUTPATIENT)
Dept: SPEECH THERAPY | Facility: CLINIC | Age: 68
DRG: 690 | End: 2018-01-01
Attending: NURSE PRACTITIONER
Payer: COMMERCIAL

## 2018-01-01 ENCOUNTER — OFFICE VISIT (OUTPATIENT)
Dept: CARDIOLOGY | Facility: CLINIC | Age: 68
DRG: 314 | End: 2018-01-01
Attending: NURSE PRACTITIONER
Payer: COMMERCIAL

## 2018-01-01 ENCOUNTER — APPOINTMENT (OUTPATIENT)
Dept: GENERAL RADIOLOGY | Facility: CLINIC | Age: 68
End: 2018-01-01
Attending: FAMILY MEDICINE
Payer: COMMERCIAL

## 2018-01-01 ENCOUNTER — APPOINTMENT (OUTPATIENT)
Dept: GENERAL RADIOLOGY | Facility: CLINIC | Age: 68
DRG: 064 | End: 2018-01-01
Attending: STUDENT IN AN ORGANIZED HEALTH CARE EDUCATION/TRAINING PROGRAM
Payer: COMMERCIAL

## 2018-01-01 ENCOUNTER — HOSPITAL ENCOUNTER (INPATIENT)
Facility: CLINIC | Age: 68
LOS: 15 days | Discharge: HOME HEALTH | DRG: 064 | End: 2018-06-22
Attending: EMERGENCY MEDICINE | Admitting: PSYCHIATRY & NEUROLOGY
Payer: COMMERCIAL

## 2018-01-01 ENCOUNTER — TELEPHONE (OUTPATIENT)
Dept: PHARMACY | Facility: OTHER | Age: 68
End: 2018-01-01

## 2018-01-01 ENCOUNTER — APPOINTMENT (OUTPATIENT)
Dept: CT IMAGING | Facility: CLINIC | Age: 68
DRG: 394 | End: 2018-01-01
Attending: EMERGENCY MEDICINE
Payer: COMMERCIAL

## 2018-01-01 ENCOUNTER — HOSPITAL ENCOUNTER (EMERGENCY)
Facility: CLINIC | Age: 68
Discharge: HOME | End: 2018-03-07
Attending: EMERGENCY MEDICINE | Admitting: EMERGENCY MEDICINE
Payer: COMMERCIAL

## 2018-01-01 ENCOUNTER — HOSPITAL ENCOUNTER (EMERGENCY)
Facility: CLINIC | Age: 68
Discharge: HOME | End: 2018-05-31
Attending: EMERGENCY MEDICINE | Admitting: EMERGENCY MEDICINE
Payer: COMMERCIAL

## 2018-01-01 ENCOUNTER — APPOINTMENT (OUTPATIENT)
Dept: GENERAL RADIOLOGY | Facility: CLINIC | Age: 68
DRG: 064 | End: 2018-01-01
Attending: EMERGENCY MEDICINE
Payer: COMMERCIAL

## 2018-01-01 ENCOUNTER — APPOINTMENT (OUTPATIENT)
Dept: GENERAL RADIOLOGY | Facility: CLINIC | Age: 68
DRG: 690 | End: 2018-01-01
Attending: NURSE PRACTITIONER
Payer: COMMERCIAL

## 2018-01-01 ENCOUNTER — PRE VISIT (OUTPATIENT)
Dept: OTOLARYNGOLOGY | Facility: CLINIC | Age: 68
End: 2018-01-01

## 2018-01-01 ENCOUNTER — OFFICE VISIT (OUTPATIENT)
Dept: GERIATRICS | Facility: CLINIC | Age: 68
End: 2018-01-01
Payer: COMMERCIAL

## 2018-01-01 ENCOUNTER — ALLIED HEALTH/NURSE VISIT (OUTPATIENT)
Dept: PHARMACY | Facility: CLINIC | Age: 68
End: 2018-01-01
Attending: PSYCHIATRY & NEUROLOGY
Payer: COMMERCIAL

## 2018-01-01 ENCOUNTER — HOSPITAL ENCOUNTER (EMERGENCY)
Facility: CLINIC | Age: 68
Discharge: HOME | End: 2018-03-19
Attending: EMERGENCY MEDICINE | Admitting: EMERGENCY MEDICINE
Payer: COMMERCIAL

## 2018-01-01 ENCOUNTER — HOSPITAL ENCOUNTER (INPATIENT)
Facility: CLINIC | Age: 68
LOS: 2 days | Discharge: HOME | DRG: 064 | End: 2018-05-12
Attending: FAMILY MEDICINE | Admitting: INTERNAL MEDICINE
Payer: COMMERCIAL

## 2018-01-01 ENCOUNTER — APPOINTMENT (OUTPATIENT)
Dept: GENERAL RADIOLOGY | Facility: CLINIC | Age: 68
DRG: 177 | End: 2018-01-01
Payer: COMMERCIAL

## 2018-01-01 ENCOUNTER — HOSPITAL ENCOUNTER (EMERGENCY)
Facility: CLINIC | Age: 68
Discharge: HOME | End: 2018-05-16
Attending: FAMILY MEDICINE | Admitting: FAMILY MEDICINE
Payer: COMMERCIAL

## 2018-01-01 ENCOUNTER — APPOINTMENT (OUTPATIENT)
Dept: CT IMAGING | Facility: CLINIC | Age: 68
DRG: 064 | End: 2018-01-01
Attending: EMERGENCY MEDICINE
Payer: COMMERCIAL

## 2018-01-01 ENCOUNTER — HOSPITAL ENCOUNTER (EMERGENCY)
Facility: CLINIC | Age: 68
Discharge: HOME | End: 2018-05-07
Attending: EMERGENCY MEDICINE | Admitting: EMERGENCY MEDICINE
Payer: COMMERCIAL

## 2018-01-01 ENCOUNTER — APPOINTMENT (OUTPATIENT)
Dept: SPEECH THERAPY | Facility: CLINIC | Age: 68
DRG: 064 | End: 2018-01-01
Payer: COMMERCIAL

## 2018-01-01 ENCOUNTER — ALLIED HEALTH/NURSE VISIT (OUTPATIENT)
Dept: PHARMACY | Facility: CLINIC | Age: 68
End: 2018-01-01
Attending: INTERNAL MEDICINE
Payer: COMMERCIAL

## 2018-01-01 ENCOUNTER — APPOINTMENT (OUTPATIENT)
Dept: GENERAL RADIOLOGY | Facility: CLINIC | Age: 68
DRG: 314 | End: 2018-01-01
Attending: NURSE PRACTITIONER
Payer: COMMERCIAL

## 2018-01-01 ENCOUNTER — OFFICE VISIT (OUTPATIENT)
Dept: CARDIOLOGY | Facility: CLINIC | Age: 68
End: 2018-01-01
Attending: NURSE PRACTITIONER
Payer: COMMERCIAL

## 2018-01-01 ENCOUNTER — OFFICE VISIT (OUTPATIENT)
Dept: OTOLARYNGOLOGY | Facility: CLINIC | Age: 68
End: 2018-01-01
Attending: NURSE PRACTITIONER
Payer: COMMERCIAL

## 2018-01-01 ENCOUNTER — TRANSFERRED RECORDS (OUTPATIENT)
Dept: HEALTH INFORMATION MANAGEMENT | Facility: CLINIC | Age: 68
End: 2018-01-01

## 2018-01-01 ENCOUNTER — HOSPITAL ENCOUNTER (EMERGENCY)
Facility: CLINIC | Age: 68
Discharge: HOME | End: 2018-07-06
Attending: EMERGENCY MEDICINE | Admitting: EMERGENCY MEDICINE
Payer: COMMERCIAL

## 2018-01-01 ENCOUNTER — HOSPITAL ENCOUNTER (EMERGENCY)
Facility: CLINIC | Age: 68
Discharge: HOME | End: 2018-06-29
Attending: EMERGENCY MEDICINE | Admitting: EMERGENCY MEDICINE
Payer: COMMERCIAL

## 2018-01-01 ENCOUNTER — HOSPITAL ENCOUNTER (INPATIENT)
Facility: CLINIC | Age: 68
LOS: 1 days | DRG: 177 | End: 2018-07-14
Attending: EMERGENCY MEDICINE | Admitting: INTERNAL MEDICINE
Payer: COMMERCIAL

## 2018-01-01 ENCOUNTER — APPOINTMENT (OUTPATIENT)
Dept: ULTRASOUND IMAGING | Facility: CLINIC | Age: 68
DRG: 394 | End: 2018-01-01
Attending: EMERGENCY MEDICINE
Payer: COMMERCIAL

## 2018-01-01 ENCOUNTER — APPOINTMENT (OUTPATIENT)
Dept: LAB | Facility: CLINIC | Age: 68
End: 2018-01-01
Attending: FAMILY MEDICINE
Payer: COMMERCIAL

## 2018-01-01 ENCOUNTER — OFFICE VISIT (OUTPATIENT)
Dept: AUDIOLOGY | Facility: CLINIC | Age: 68
End: 2018-01-01
Attending: NURSE PRACTITIONER
Payer: COMMERCIAL

## 2018-01-01 VITALS
WEIGHT: 200 LBS | SYSTOLIC BLOOD PRESSURE: 85 MMHG | HEIGHT: 68 IN | HEART RATE: 87 BPM | TEMPERATURE: 97.2 F | RESPIRATION RATE: 19 BRPM | DIASTOLIC BLOOD PRESSURE: 63 MMHG | BODY MASS INDEX: 30.31 KG/M2 | OXYGEN SATURATION: 100 %

## 2018-01-01 VITALS
HEART RATE: 95 BPM | HEIGHT: 72 IN | TEMPERATURE: 98.9 F | SYSTOLIC BLOOD PRESSURE: 108 MMHG | OXYGEN SATURATION: 95 % | DIASTOLIC BLOOD PRESSURE: 76 MMHG | WEIGHT: 186.07 LBS | RESPIRATION RATE: 20 BRPM | BODY MASS INDEX: 25.2 KG/M2

## 2018-01-01 VITALS — SYSTOLIC BLOOD PRESSURE: 96 MMHG | HEIGHT: 68 IN | HEART RATE: 61 BPM | OXYGEN SATURATION: 100 %

## 2018-01-01 VITALS
SYSTOLIC BLOOD PRESSURE: 117 MMHG | TEMPERATURE: 97.7 F | OXYGEN SATURATION: 93 % | RESPIRATION RATE: 16 BRPM | DIASTOLIC BLOOD PRESSURE: 82 MMHG | WEIGHT: 191.36 LBS | BODY MASS INDEX: 29.1 KG/M2

## 2018-01-01 VITALS
BODY MASS INDEX: 28.79 KG/M2 | WEIGHT: 190 LBS | HEART RATE: 64 BPM | HEIGHT: 68 IN | OXYGEN SATURATION: 100 % | SYSTOLIC BLOOD PRESSURE: 103 MMHG | DIASTOLIC BLOOD PRESSURE: 78 MMHG | RESPIRATION RATE: 18 BRPM | TEMPERATURE: 98.1 F

## 2018-01-01 VITALS — OXYGEN SATURATION: 99 % | SYSTOLIC BLOOD PRESSURE: 82 MMHG | HEART RATE: 59 BPM

## 2018-01-01 VITALS
RESPIRATION RATE: 18 BRPM | HEART RATE: 77 BPM | DIASTOLIC BLOOD PRESSURE: 79 MMHG | HEIGHT: 68 IN | OXYGEN SATURATION: 97 % | WEIGHT: 182.1 LBS | TEMPERATURE: 97.8 F | SYSTOLIC BLOOD PRESSURE: 95 MMHG | BODY MASS INDEX: 27.6 KG/M2

## 2018-01-01 VITALS
DIASTOLIC BLOOD PRESSURE: 85 MMHG | HEIGHT: 68 IN | OXYGEN SATURATION: 99 % | BODY MASS INDEX: 28.19 KG/M2 | SYSTOLIC BLOOD PRESSURE: 127 MMHG | RESPIRATION RATE: 11 BRPM | WEIGHT: 186 LBS | TEMPERATURE: 98 F

## 2018-01-01 VITALS
WEIGHT: 196.87 LBS | RESPIRATION RATE: 11 BRPM | DIASTOLIC BLOOD PRESSURE: 62 MMHG | TEMPERATURE: 98.5 F | BODY MASS INDEX: 29.93 KG/M2 | SYSTOLIC BLOOD PRESSURE: 108 MMHG | OXYGEN SATURATION: 99 % | HEART RATE: 60 BPM

## 2018-01-01 VITALS
SYSTOLIC BLOOD PRESSURE: 99 MMHG | HEART RATE: 56 BPM | WEIGHT: 180 LBS | OXYGEN SATURATION: 100 % | BODY MASS INDEX: 27.37 KG/M2

## 2018-01-01 VITALS
DIASTOLIC BLOOD PRESSURE: 74 MMHG | SYSTOLIC BLOOD PRESSURE: 91 MMHG | WEIGHT: 196.65 LBS | OXYGEN SATURATION: 100 % | RESPIRATION RATE: 16 BRPM | BODY MASS INDEX: 29.9 KG/M2 | TEMPERATURE: 97.8 F

## 2018-01-01 VITALS — OXYGEN SATURATION: 96 % | SYSTOLIC BLOOD PRESSURE: 90 MMHG | HEART RATE: 60 BPM

## 2018-01-01 VITALS
RESPIRATION RATE: 12 BRPM | DIASTOLIC BLOOD PRESSURE: 89 MMHG | HEART RATE: 74 BPM | TEMPERATURE: 97.7 F | OXYGEN SATURATION: 93 % | SYSTOLIC BLOOD PRESSURE: 117 MMHG

## 2018-01-01 VITALS
SYSTOLIC BLOOD PRESSURE: 77 MMHG | HEART RATE: 107 BPM | BODY MASS INDEX: 28.73 KG/M2 | RESPIRATION RATE: 16 BRPM | WEIGHT: 188.93 LBS | OXYGEN SATURATION: 98 % | TEMPERATURE: 97.8 F | DIASTOLIC BLOOD PRESSURE: 59 MMHG

## 2018-01-01 VITALS
DIASTOLIC BLOOD PRESSURE: 102 MMHG | SYSTOLIC BLOOD PRESSURE: 123 MMHG | RESPIRATION RATE: 24 BRPM | BODY MASS INDEX: 27.98 KG/M2 | OXYGEN SATURATION: 100 % | TEMPERATURE: 97.4 F | WEIGHT: 184 LBS | HEART RATE: 74 BPM

## 2018-01-01 VITALS
DIASTOLIC BLOOD PRESSURE: 79 MMHG | RESPIRATION RATE: 16 BRPM | HEIGHT: 68 IN | SYSTOLIC BLOOD PRESSURE: 101 MMHG | OXYGEN SATURATION: 100 % | HEART RATE: 96 BPM | TEMPERATURE: 100 F

## 2018-01-01 VITALS
HEIGHT: 68 IN | RESPIRATION RATE: 18 BRPM | SYSTOLIC BLOOD PRESSURE: 89 MMHG | BODY MASS INDEX: 31.52 KG/M2 | WEIGHT: 208 LBS | OXYGEN SATURATION: 99 % | TEMPERATURE: 97.8 F | HEART RATE: 58 BPM | DIASTOLIC BLOOD PRESSURE: 73 MMHG

## 2018-01-01 VITALS
WEIGHT: 204.37 LBS | OXYGEN SATURATION: 75 % | TEMPERATURE: 97.5 F | DIASTOLIC BLOOD PRESSURE: 51 MMHG | RESPIRATION RATE: 30 BRPM | BODY MASS INDEX: 31.07 KG/M2 | SYSTOLIC BLOOD PRESSURE: 67 MMHG

## 2018-01-01 DIAGNOSIS — E11.649 TYPE 2 DIABETES MELLITUS WITH HYPOGLYCEMIA WITHOUT COMA, WITHOUT LONG-TERM CURRENT USE OF INSULIN (H): ICD-10-CM

## 2018-01-01 DIAGNOSIS — Z79.01 LONG-TERM (CURRENT) USE OF ANTICOAGULANTS: ICD-10-CM

## 2018-01-01 DIAGNOSIS — R13.12 OROPHARYNGEAL DYSPHAGIA: ICD-10-CM

## 2018-01-01 DIAGNOSIS — R06.02 SHORTNESS OF BREATH: ICD-10-CM

## 2018-01-01 DIAGNOSIS — I50.22 CHRONIC SYSTOLIC CONGESTIVE HEART FAILURE (H): ICD-10-CM

## 2018-01-01 DIAGNOSIS — I63.411 CEREBROVASCULAR ACCIDENT (CVA) DUE TO EMBOLISM OF RIGHT MIDDLE CEREBRAL ARTERY (H): Primary | ICD-10-CM

## 2018-01-01 DIAGNOSIS — I25.5 ISCHEMIC CARDIOMYOPATHY: ICD-10-CM

## 2018-01-01 DIAGNOSIS — K59.01 SLOW TRANSIT CONSTIPATION: ICD-10-CM

## 2018-01-01 DIAGNOSIS — I50.22 CHRONIC SYSTOLIC CONGESTIVE HEART FAILURE, NYHA CLASS 4 (H): ICD-10-CM

## 2018-01-01 DIAGNOSIS — N18.30 TYPE 2 DIABETES MELLITUS WITH STAGE 3 CHRONIC KIDNEY DISEASE, WITHOUT LONG-TERM CURRENT USE OF INSULIN (H): ICD-10-CM

## 2018-01-01 DIAGNOSIS — B37.2 YEAST DERMATITIS: ICD-10-CM

## 2018-01-01 DIAGNOSIS — N39.0 SEPSIS DUE TO URINARY TRACT INFECTION (H): ICD-10-CM

## 2018-01-01 DIAGNOSIS — J04.0 LARYNGITIS: Primary | ICD-10-CM

## 2018-01-01 DIAGNOSIS — T82.9XXD COMPLICATION INVOLVING LEFT VENTRICULAR ASSIST DEVICE (LVAD), SUBSEQUENT ENCOUNTER: ICD-10-CM

## 2018-01-01 DIAGNOSIS — I50.22 CHRONIC SYSTOLIC CONGESTIVE HEART FAILURE (H): Primary | ICD-10-CM

## 2018-01-01 DIAGNOSIS — R31.9 URINARY TRACT INFECTION WITH HEMATURIA, SITE UNSPECIFIED: Primary | ICD-10-CM

## 2018-01-01 DIAGNOSIS — Z95.811 LVAD (LEFT VENTRICULAR ASSIST DEVICE) PRESENT (H): ICD-10-CM

## 2018-01-01 DIAGNOSIS — J98.11 PULMONARY ATELECTASIS: ICD-10-CM

## 2018-01-01 DIAGNOSIS — J20.9 ACUTE BRONCHITIS, UNSPECIFIED ORGANISM: Primary | ICD-10-CM

## 2018-01-01 DIAGNOSIS — R06.02 SOB (SHORTNESS OF BREATH): ICD-10-CM

## 2018-01-01 DIAGNOSIS — T82.9XXS COMPLICATION INVOLVING LEFT VENTRICULAR ASSIST DEVICE (LVAD), SEQUELA: ICD-10-CM

## 2018-01-01 DIAGNOSIS — H90.3 SENSORINEURAL HEARING LOSS (SNHL) OF BOTH EARS: ICD-10-CM

## 2018-01-01 DIAGNOSIS — R13.10 DYSPHAGIA, UNSPECIFIED TYPE: Primary | ICD-10-CM

## 2018-01-01 DIAGNOSIS — R51.9 ACUTE NONINTRACTABLE HEADACHE, UNSPECIFIED HEADACHE TYPE: ICD-10-CM

## 2018-01-01 DIAGNOSIS — H61.23 BILATERAL IMPACTED CERUMEN: ICD-10-CM

## 2018-01-01 DIAGNOSIS — E16.2 HYPOGLYCEMIA: ICD-10-CM

## 2018-01-01 DIAGNOSIS — M10.9 ACUTE GOUTY ARTHRITIS: Primary | ICD-10-CM

## 2018-01-01 DIAGNOSIS — Z95.811 LVAD (LEFT VENTRICULAR ASSIST DEVICE) PRESENT (H): Primary | ICD-10-CM

## 2018-01-01 DIAGNOSIS — I10 ESSENTIAL HYPERTENSION: ICD-10-CM

## 2018-01-01 DIAGNOSIS — R06.03 RESPIRATORY DISTRESS: Primary | ICD-10-CM

## 2018-01-01 DIAGNOSIS — S72.001K CLOSED FRACTURE OF NECK OF RIGHT FEMUR WITH NONUNION, SUBSEQUENT ENCOUNTER: ICD-10-CM

## 2018-01-01 DIAGNOSIS — N30.00 ACUTE CYSTITIS WITHOUT HEMATURIA: ICD-10-CM

## 2018-01-01 DIAGNOSIS — J18.9 PNEUMONIA OF BOTH LUNGS DUE TO INFECTIOUS ORGANISM, UNSPECIFIED PART OF LUNG: ICD-10-CM

## 2018-01-01 DIAGNOSIS — N39.42 URINARY INCONTINENCE WITHOUT SENSORY AWARENESS: ICD-10-CM

## 2018-01-01 DIAGNOSIS — R56.9 SEIZURE (H): ICD-10-CM

## 2018-01-01 DIAGNOSIS — E11.65 TYPE 2 DIABETES MELLITUS WITH HYPERGLYCEMIA, WITH LONG-TERM CURRENT USE OF INSULIN (H): ICD-10-CM

## 2018-01-01 DIAGNOSIS — Z87.891 PERSONAL HISTORY OF TOBACCO USE, PRESENTING HAZARDS TO HEALTH: ICD-10-CM

## 2018-01-01 DIAGNOSIS — T82.9XXA COMPLICATION INVOLVING LEFT VENTRICULAR ASSIST DEVICE (LVAD), INITIAL ENCOUNTER: ICD-10-CM

## 2018-01-01 DIAGNOSIS — I69.30 HISTORY OF ISCHEMIC CEREBROVASCULAR ACCIDENT (CVA) WITH RESIDUAL DEFICIT: ICD-10-CM

## 2018-01-01 DIAGNOSIS — Z53.9 ERRONEOUS ENCOUNTER--DISREGARD: Primary | ICD-10-CM

## 2018-01-01 DIAGNOSIS — Z71.89 ADVANCED DIRECTIVES, COUNSELING/DISCUSSION: ICD-10-CM

## 2018-01-01 DIAGNOSIS — I50.23: Primary | ICD-10-CM

## 2018-01-01 DIAGNOSIS — N39.0 URINARY TRACT INFECTION WITH HEMATURIA, SITE UNSPECIFIED: Primary | ICD-10-CM

## 2018-01-01 DIAGNOSIS — R33.9 INCOMPLETE BLADDER EMPTYING: ICD-10-CM

## 2018-01-01 DIAGNOSIS — M54.2 NECK PAIN: ICD-10-CM

## 2018-01-01 DIAGNOSIS — R47.01 APHASIA: ICD-10-CM

## 2018-01-01 DIAGNOSIS — J98.11 PULMONARY ATELECTASIS: Primary | ICD-10-CM

## 2018-01-01 DIAGNOSIS — N18.30 CKD (CHRONIC KIDNEY DISEASE) STAGE 3, GFR 30-59 ML/MIN (H): ICD-10-CM

## 2018-01-01 DIAGNOSIS — H90.3 SENSORINEURAL HEARING LOSS (SNHL) OF BOTH EARS: Primary | ICD-10-CM

## 2018-01-01 DIAGNOSIS — N40.1 BENIGN PROSTATIC HYPERPLASIA WITH URINARY RETENTION: ICD-10-CM

## 2018-01-01 DIAGNOSIS — G93.40 ENCEPHALOPATHY: Primary | ICD-10-CM

## 2018-01-01 DIAGNOSIS — Z87.440 PERSONAL HISTORY OF URINARY TRACT INFECTION: Primary | ICD-10-CM

## 2018-01-01 DIAGNOSIS — H69.90 DYSFUNCTION OF EUSTACHIAN TUBE, UNSPECIFIED LATERALITY: Primary | ICD-10-CM

## 2018-01-01 DIAGNOSIS — J18.9 COMMUNITY ACQUIRED PNEUMONIA OF LEFT LUNG, UNSPECIFIED PART OF LUNG: ICD-10-CM

## 2018-01-01 DIAGNOSIS — E11.22 TYPE 2 DIABETES MELLITUS WITH STAGE 3 CHRONIC KIDNEY DISEASE, WITHOUT LONG-TERM CURRENT USE OF INSULIN (H): ICD-10-CM

## 2018-01-01 DIAGNOSIS — K85.00 IDIOPATHIC ACUTE PANCREATITIS, UNSPECIFIED COMPLICATION STATUS: ICD-10-CM

## 2018-01-01 DIAGNOSIS — N40.0 BPH (BENIGN PROSTATIC HYPERPLASIA): Primary | ICD-10-CM

## 2018-01-01 DIAGNOSIS — Z95.811 PRESENCE OF HEART ASSIST DEVICE (H): ICD-10-CM

## 2018-01-01 DIAGNOSIS — R33.9 URINARY RETENTION: ICD-10-CM

## 2018-01-01 DIAGNOSIS — I50.21: Primary | ICD-10-CM

## 2018-01-01 DIAGNOSIS — I63.411 CEREBRAL INFARCTION DUE TO EMBOLISM OF RIGHT MIDDLE CEREBRAL ARTERY (H): ICD-10-CM

## 2018-01-01 DIAGNOSIS — K21.00 GASTROESOPHAGEAL REFLUX DISEASE WITH ESOPHAGITIS: ICD-10-CM

## 2018-01-01 DIAGNOSIS — J96.21 ACUTE AND CHRONIC RESPIRATORY FAILURE WITH HYPOXIA (H): ICD-10-CM

## 2018-01-01 DIAGNOSIS — A41.9 SEPSIS DUE TO URINARY TRACT INFECTION (H): ICD-10-CM

## 2018-01-01 DIAGNOSIS — N18.4 CKD (CHRONIC KIDNEY DISEASE) STAGE 4, GFR 15-29 ML/MIN (H): ICD-10-CM

## 2018-01-01 DIAGNOSIS — R15.9 FULL INCONTINENCE OF FECES: ICD-10-CM

## 2018-01-01 DIAGNOSIS — Z97.8 USES FEEDING TUBE: ICD-10-CM

## 2018-01-01 DIAGNOSIS — R33.8 BENIGN PROSTATIC HYPERPLASIA WITH URINARY RETENTION: ICD-10-CM

## 2018-01-01 DIAGNOSIS — H61.23 BILATERAL IMPACTED CERUMEN: Primary | ICD-10-CM

## 2018-01-01 DIAGNOSIS — G47.01 INSOMNIA DUE TO MEDICAL CONDITION: ICD-10-CM

## 2018-01-01 DIAGNOSIS — R06.00 DYSPNEA, UNSPECIFIED TYPE: ICD-10-CM

## 2018-01-01 DIAGNOSIS — N39.0 URINARY TRACT INFECTION: ICD-10-CM

## 2018-01-01 DIAGNOSIS — R49.0 HOARSENESS: Primary | ICD-10-CM

## 2018-01-01 DIAGNOSIS — R53.2 FUNCTIONAL QUADRIPLEGIA (H): ICD-10-CM

## 2018-01-01 DIAGNOSIS — I25.5 ISCHEMIC CARDIOMYOPATHY: Primary | ICD-10-CM

## 2018-01-01 DIAGNOSIS — Z53.9 DIAGNOSIS NOT YET DEFINED: Primary | ICD-10-CM

## 2018-01-01 DIAGNOSIS — J18.9 PNEUMONIA DUE TO INFECTIOUS ORGANISM, UNSPECIFIED LATERALITY, UNSPECIFIED PART OF LUNG: ICD-10-CM

## 2018-01-01 DIAGNOSIS — I63.9 CEREBROVASCULAR ACCIDENT (CVA), UNSPECIFIED MECHANISM (H): ICD-10-CM

## 2018-01-01 DIAGNOSIS — N39.0 URINARY TRACT INFECTION WITHOUT HEMATURIA, SITE UNSPECIFIED: ICD-10-CM

## 2018-01-01 DIAGNOSIS — I25.110 CORONARY ARTERY DISEASE INVOLVING NATIVE CORONARY ARTERY OF NATIVE HEART WITH UNSTABLE ANGINA PECTORIS (H): ICD-10-CM

## 2018-01-01 DIAGNOSIS — N17.9 ACUTE KIDNEY INJURY (H): ICD-10-CM

## 2018-01-01 DIAGNOSIS — R56.9 CONVULSIONS, UNSPECIFIED CONVULSION TYPE (H): ICD-10-CM

## 2018-01-01 DIAGNOSIS — R07.9 ACUTE CHEST PAIN: ICD-10-CM

## 2018-01-01 DIAGNOSIS — I63.89 CEREBROVASCULAR ACCIDENT (CVA) DUE TO OTHER MECHANISM (H): Primary | ICD-10-CM

## 2018-01-01 DIAGNOSIS — K59.00 CONSTIPATION, UNSPECIFIED CONSTIPATION TYPE: ICD-10-CM

## 2018-01-01 DIAGNOSIS — R05.9 COUGH: ICD-10-CM

## 2018-01-01 DIAGNOSIS — I50.22 CHRONIC SYSTOLIC CONGESTIVE HEART FAILURE, NYHA CLASS 4 (H): Primary | ICD-10-CM

## 2018-01-01 DIAGNOSIS — I50.9 CHRONIC CONGESTIVE HEART FAILURE, UNSPECIFIED CONGESTIVE HEART FAILURE TYPE: ICD-10-CM

## 2018-01-01 DIAGNOSIS — E63.9 NUTRITIONAL DEFICIENCY: ICD-10-CM

## 2018-01-01 DIAGNOSIS — M1A.20X0 CHRONIC GOUT DUE TO DRUG WITHOUT TOPHUS, UNSPECIFIED SITE: ICD-10-CM

## 2018-01-01 DIAGNOSIS — H92.09 OTALGIA, UNSPECIFIED LATERALITY: Primary | ICD-10-CM

## 2018-01-01 DIAGNOSIS — I50.23 ACUTE ON CHRONIC SYSTOLIC CONGESTIVE HEART FAILURE (H): ICD-10-CM

## 2018-01-01 DIAGNOSIS — R07.9 CHEST PAIN, UNSPECIFIED TYPE: ICD-10-CM

## 2018-01-01 DIAGNOSIS — N30.01 ACUTE CYSTITIS WITH HEMATURIA: ICD-10-CM

## 2018-01-01 DIAGNOSIS — Z79.4 TYPE 2 DIABETES MELLITUS WITH HYPERGLYCEMIA, WITH LONG-TERM CURRENT USE OF INSULIN (H): ICD-10-CM

## 2018-01-01 DIAGNOSIS — R05.3 CHRONIC COUGH: ICD-10-CM

## 2018-01-01 DIAGNOSIS — J30.89 CHRONIC NONSEASONAL ALLERGIC RHINITIS DUE TO POLLEN: ICD-10-CM

## 2018-01-01 DIAGNOSIS — R06.03 RESPIRATORY DISTRESS: ICD-10-CM

## 2018-01-01 DIAGNOSIS — G47.00 INSOMNIA, UNSPECIFIED TYPE: ICD-10-CM

## 2018-01-01 DIAGNOSIS — K59.09 OTHER CONSTIPATION: ICD-10-CM

## 2018-01-01 LAB
ABO + RH BLD: NORMAL
ACID FAST STN SPEC QL: NORMAL
ALBUMIN SERPL-MCNC: 2.4 G/DL (ref 3.4–5)
ALBUMIN SERPL-MCNC: 2.6 G/DL (ref 3.4–5)
ALBUMIN SERPL-MCNC: 2.6 G/DL (ref 3.4–5)
ALBUMIN SERPL-MCNC: 2.8 G/DL (ref 3.4–5)
ALBUMIN SERPL-MCNC: 2.9 G/DL (ref 3.4–5)
ALBUMIN SERPL-MCNC: 3 G/DL (ref 3.4–5)
ALBUMIN SERPL-MCNC: 3.1 G/DL (ref 3.4–5)
ALBUMIN SERPL-MCNC: 3.2 G/DL (ref 3.4–5)
ALBUMIN SERPL-MCNC: 3.3 G/DL (ref 3.4–5)
ALBUMIN SERPL-MCNC: 3.3 G/DL (ref 3.4–5)
ALBUMIN SERPL-MCNC: 3.4 G/DL (ref 3.4–5)
ALBUMIN UR-MCNC: 100 MG/DL
ALBUMIN UR-MCNC: 100 MG/DL
ALBUMIN UR-MCNC: 30 MG/DL
ALBUMIN UR-MCNC: NEGATIVE MG/DL
ALP SERPL-CCNC: 100 U/L (ref 40–150)
ALP SERPL-CCNC: 102 U/L (ref 40–150)
ALP SERPL-CCNC: 105 U/L (ref 40–150)
ALP SERPL-CCNC: 115 U/L (ref 40–150)
ALP SERPL-CCNC: 116 U/L (ref 40–150)
ALP SERPL-CCNC: 117 U/L (ref 40–150)
ALP SERPL-CCNC: 121 U/L (ref 40–150)
ALP SERPL-CCNC: 121 U/L (ref 40–150)
ALP SERPL-CCNC: 122 U/L (ref 40–150)
ALP SERPL-CCNC: 87 U/L (ref 40–150)
ALP SERPL-CCNC: 88 U/L (ref 40–150)
ALP SERPL-CCNC: 93 U/L (ref 40–150)
ALP SERPL-CCNC: 93 U/L (ref 40–150)
ALP SERPL-CCNC: 94 U/L (ref 40–150)
ALP SERPL-CCNC: 95 U/L (ref 40–150)
ALP SERPL-CCNC: 95 U/L (ref 40–150)
ALP SERPL-CCNC: 99 U/L (ref 40–150)
ALT SERPL W P-5'-P-CCNC: 100 U/L (ref 0–70)
ALT SERPL W P-5'-P-CCNC: 13 U/L (ref 0–70)
ALT SERPL W P-5'-P-CCNC: 1346 U/L (ref 0–70)
ALT SERPL W P-5'-P-CCNC: 14 U/L (ref 0–70)
ALT SERPL W P-5'-P-CCNC: 14 U/L (ref 0–70)
ALT SERPL W P-5'-P-CCNC: 16 U/L (ref 0–70)
ALT SERPL W P-5'-P-CCNC: 18 U/L (ref 0–70)
ALT SERPL W P-5'-P-CCNC: 19 U/L (ref 0–70)
ALT SERPL W P-5'-P-CCNC: 19 U/L (ref 0–70)
ALT SERPL W P-5'-P-CCNC: 20 U/L (ref 0–70)
ALT SERPL W P-5'-P-CCNC: 22 U/L (ref 0–70)
ALT SERPL W P-5'-P-CCNC: 23 U/L (ref 0–70)
ALT SERPL W P-5'-P-CCNC: 24 U/L (ref 0–70)
ALT SERPL W P-5'-P-CCNC: 26 U/L (ref 0–70)
ALT SERPL W P-5'-P-CCNC: 27 U/L (ref 0–70)
ALT SERPL W P-5'-P-CCNC: 32 U/L (ref 0–70)
ALT SERPL W P-5'-P-CCNC: 514 U/L (ref 0–70)
AMMONIA PLAS-SCNC: NORMAL UMOL/L (ref 10–50)
AMMONIA PLAS-SCNC: NORMAL UMOL/L (ref 10–50)
AMORPH CRY #/AREA URNS HPF: ABNORMAL /HPF
ANION GAP SERPL CALCULATED.3IONS-SCNC: 10 MMOL/L (ref 3–14)
ANION GAP SERPL CALCULATED.3IONS-SCNC: 11 MMOL/L (ref 3–14)
ANION GAP SERPL CALCULATED.3IONS-SCNC: 12 MMOL/L (ref 3–14)
ANION GAP SERPL CALCULATED.3IONS-SCNC: 13 MMOL/L (ref 3–14)
ANION GAP SERPL CALCULATED.3IONS-SCNC: 19 MMOL/L (ref 3–14)
ANION GAP SERPL CALCULATED.3IONS-SCNC: 20 MMOL/L (ref 3–14)
ANION GAP SERPL CALCULATED.3IONS-SCNC: 21 MMOL/L (ref 3–14)
ANION GAP SERPL CALCULATED.3IONS-SCNC: 25 MMOL/L (ref 3–14)
ANION GAP SERPL CALCULATED.3IONS-SCNC: 5 MMOL/L (ref 3–14)
ANION GAP SERPL CALCULATED.3IONS-SCNC: 6 MMOL/L (ref 3–14)
ANION GAP SERPL CALCULATED.3IONS-SCNC: 8 MMOL/L (ref 3–14)
ANION GAP SERPL CALCULATED.3IONS-SCNC: 8 MMOL/L (ref 5–18)
ANION GAP SERPL CALCULATED.3IONS-SCNC: 9 MMOL/L (ref 3–14)
ANISOCYTOSIS BLD QL SMEAR: ABNORMAL
APPEARANCE UR: ABNORMAL
APPEARANCE UR: CLEAR
APTT PPP: 35 SEC (ref 22–37)
APTT PPP: 43 SEC (ref 22–37)
APTT PPP: 45 SEC (ref 22–37)
APTT PPP: 48 SEC (ref 22–37)
APTT PPP: 51 SEC (ref 22–37)
APTT PPP: 51 SEC (ref 22–37)
APTT PPP: 55 SEC (ref 22–37)
AST SERPL W P-5'-P-CCNC: 146 U/L (ref 0–45)
AST SERPL W P-5'-P-CCNC: 148 U/L (ref 0–45)
AST SERPL W P-5'-P-CCNC: 30 U/L (ref 0–45)
AST SERPL W P-5'-P-CCNC: 30 U/L (ref 0–45)
AST SERPL W P-5'-P-CCNC: 42 U/L (ref 0–45)
AST SERPL W P-5'-P-CCNC: 43 U/L (ref 0–45)
AST SERPL W P-5'-P-CCNC: 44 U/L (ref 0–45)
AST SERPL W P-5'-P-CCNC: 50 U/L (ref 0–45)
AST SERPL W P-5'-P-CCNC: 54 U/L (ref 0–45)
AST SERPL W P-5'-P-CCNC: 58 U/L (ref 0–45)
AST SERPL W P-5'-P-CCNC: 64 U/L (ref 0–45)
AST SERPL W P-5'-P-CCNC: 83 U/L (ref 0–45)
AST SERPL W P-5'-P-CCNC: ABNORMAL U/L (ref 0–45)
BACTERIA #/AREA URNS HPF: ABNORMAL /HPF
BACTERIA SPEC CULT: ABNORMAL
BACTERIA SPEC CULT: NO GROWTH
BACTERIA SPEC CULT: NORMAL
BASE DEFICIT BLDA-SCNC: 17.3 MMOL/L
BASE DEFICIT BLDV-SCNC: 22 MMOL/L
BASOPHILS # BLD AUTO: 0 10E9/L (ref 0–0.2)
BASOPHILS # BLD AUTO: 0.1 10E9/L (ref 0–0.2)
BASOPHILS NFR BLD AUTO: 0.1 %
BASOPHILS NFR BLD AUTO: 0.2 %
BASOPHILS NFR BLD AUTO: 0.2 %
BASOPHILS NFR BLD AUTO: 0.4 %
BASOPHILS NFR BLD AUTO: 0.5 %
BASOPHILS NFR BLD AUTO: 0.6 %
BASOPHILS NFR BLD AUTO: 0.8 %
BASOPHILS NFR BLD AUTO: 0.8 %
BASOPHILS NFR BLD AUTO: 0.9 %
BILIRUB SERPL-MCNC: 0.4 MG/DL (ref 0.2–1.3)
BILIRUB SERPL-MCNC: 0.5 MG/DL (ref 0.2–1.3)
BILIRUB SERPL-MCNC: 0.5 MG/DL (ref 0.2–1.3)
BILIRUB SERPL-MCNC: 0.6 MG/DL (ref 0.2–1.3)
BILIRUB SERPL-MCNC: 0.7 MG/DL (ref 0.2–1.3)
BILIRUB SERPL-MCNC: 0.8 MG/DL (ref 0.2–1.3)
BILIRUB SERPL-MCNC: 0.9 MG/DL (ref 0.2–1.3)
BILIRUB SERPL-MCNC: 0.9 MG/DL (ref 0.2–1.3)
BILIRUB SERPL-MCNC: 1.3 MG/DL (ref 0.2–1.3)
BILIRUB SERPL-MCNC: 2.7 MG/DL (ref 0.2–1.3)
BILIRUB SERPL-MCNC: 3 MG/DL (ref 0.2–1.3)
BILIRUB UR QL STRIP: NEGATIVE
BLD GP AB SCN SERPL QL: NORMAL
BLOOD BANK CMNT PATIENT-IMP: NORMAL
BUN SERPL-MCNC: 20 MG/DL (ref 7–30)
BUN SERPL-MCNC: 21 MG/DL (ref 7–30)
BUN SERPL-MCNC: 22 MG/DL (ref 7–30)
BUN SERPL-MCNC: 22 MG/DL (ref 7–30)
BUN SERPL-MCNC: 23 MG/DL (ref 7–30)
BUN SERPL-MCNC: 23 MG/DL (ref 8–25)
BUN SERPL-MCNC: 24 MG/DL (ref 7–30)
BUN SERPL-MCNC: 24 MG/DL (ref 7–30)
BUN SERPL-MCNC: 25 MG/DL (ref 7–30)
BUN SERPL-MCNC: 26 MG/DL (ref 7–30)
BUN SERPL-MCNC: 27 MG/DL (ref 7–30)
BUN SERPL-MCNC: 28 MG/DL (ref 7–30)
BUN SERPL-MCNC: 36 MG/DL (ref 7–30)
BUN SERPL-MCNC: 36 MG/DL (ref 7–30)
BUN SERPL-MCNC: 37 MG/DL (ref 7–30)
BUN SERPL-MCNC: 37 MG/DL (ref 7–30)
BUN SERPL-MCNC: 39 MG/DL (ref 7–30)
BUN SERPL-MCNC: 42 MG/DL (ref 7–30)
BUN SERPL-MCNC: 43 MG/DL (ref 7–30)
BUN SERPL-MCNC: 47 MG/DL (ref 7–30)
BUN SERPL-MCNC: 49 MG/DL (ref 7–30)
BUN SERPL-MCNC: 51 MG/DL (ref 7–30)
BUN SERPL-MCNC: 56 MG/DL (ref 7–30)
BUN SERPL-MCNC: 58 MG/DL (ref 7–30)
BUN SERPL-MCNC: 59 MG/DL (ref 7–30)
BUN SERPL-MCNC: 60 MG/DL (ref 7–30)
BUN SERPL-MCNC: 60 MG/DL (ref 7–30)
BUN SERPL-MCNC: 62 MG/DL (ref 7–30)
BUN SERPL-MCNC: 62 MG/DL (ref 7–30)
BUN SERPL-MCNC: 64 MG/DL (ref 7–30)
BUN SERPL-MCNC: 66 MG/DL (ref 7–30)
BUN SERPL-MCNC: 69 MG/DL (ref 7–30)
BUN SERPL-MCNC: 80 MG/DL (ref 7–30)
BURR CELLS BLD QL SMEAR: ABNORMAL
C DIFF TOX B STL QL: NEGATIVE
C TRACH DNA SPEC QL NAA+PROBE: NEGATIVE
CALCIUM SERPL-MCNC: 7.5 MG/DL (ref 8.5–10.1)
CALCIUM SERPL-MCNC: 7.6 MG/DL (ref 8.5–10.1)
CALCIUM SERPL-MCNC: 7.6 MG/DL (ref 8.5–10.1)
CALCIUM SERPL-MCNC: 7.7 MG/DL (ref 8.5–10.1)
CALCIUM SERPL-MCNC: 7.8 MG/DL (ref 8.5–10.1)
CALCIUM SERPL-MCNC: 7.8 MG/DL (ref 8.5–10.1)
CALCIUM SERPL-MCNC: 7.9 MG/DL (ref 8.5–10.1)
CALCIUM SERPL-MCNC: 8 MG/DL (ref 8.5–10.1)
CALCIUM SERPL-MCNC: 8.1 MG/DL (ref 8.5–10.1)
CALCIUM SERPL-MCNC: 8.2 MG/DL (ref 8.5–10.1)
CALCIUM SERPL-MCNC: 8.2 MG/DL (ref 8.5–10.5)
CALCIUM SERPL-MCNC: 8.3 MG/DL (ref 8.5–10.1)
CALCIUM SERPL-MCNC: 8.4 MG/DL (ref 8.5–10.1)
CALCIUM SERPL-MCNC: 8.5 MG/DL (ref 8.5–10.1)
CALCIUM SERPL-MCNC: 8.6 MG/DL (ref 8.5–10.1)
CALCIUM SERPL-MCNC: 8.7 MG/DL (ref 8.5–10.1)
CALCIUM SERPL-MCNC: 8.8 MG/DL (ref 8.5–10.1)
CALCIUM SERPL-MCNC: 8.8 MG/DL (ref 8.5–10.1)
CALCIUM SERPL-MCNC: 9 MG/DL (ref 8.5–10.1)
CHLORIDE BLD-SCNC: >125 MMOL/L (ref 94–109)
CHLORIDE SERPL-SCNC: 101 MMOL/L (ref 94–109)
CHLORIDE SERPL-SCNC: 102 MMOL/L (ref 94–109)
CHLORIDE SERPL-SCNC: 102 MMOL/L (ref 94–109)
CHLORIDE SERPL-SCNC: 103 MMOL/L (ref 94–109)
CHLORIDE SERPL-SCNC: 104 MMOL/L (ref 94–109)
CHLORIDE SERPL-SCNC: 105 MMOL/L (ref 94–109)
CHLORIDE SERPL-SCNC: 105 MMOL/L (ref 94–109)
CHLORIDE SERPL-SCNC: 106 MMOL/L (ref 94–109)
CHLORIDE SERPL-SCNC: 106 MMOL/L (ref 94–109)
CHLORIDE SERPL-SCNC: 107 MMOL/L (ref 94–109)
CHLORIDE SERPL-SCNC: 108 MMOL/L (ref 94–109)
CHLORIDE SERPL-SCNC: 109 MMOL/L (ref 94–109)
CHLORIDE SERPL-SCNC: 110 MMOL/L (ref 94–109)
CHLORIDE SERPL-SCNC: 111 MMOL/L (ref 94–109)
CHLORIDE SERPL-SCNC: 112 MMOL/L (ref 94–109)
CHLORIDE SERPL-SCNC: 113 MMOL/L (ref 94–109)
CHLORIDE SERPL-SCNC: 113 MMOL/L (ref 94–109)
CHLORIDE SERPL-SCNC: 114 MMOL/L (ref 94–109)
CHLORIDE SERPL-SCNC: 115 MMOL/L (ref 94–109)
CHLORIDE SERPL-SCNC: 116 MMOL/L (ref 94–109)
CHLORIDE SERPL-SCNC: 119 MMOL/L (ref 94–109)
CHLORIDE SERPL-SCNC: 122 MMOL/L (ref 94–109)
CHLORIDE SERPL-SCNC: 124 MMOL/L (ref 94–109)
CHLORIDE SERPL-SCNC: 128 MMOL/L (ref 94–109)
CHLORIDE SERPL-SCNC: 98 MMOL/L (ref 94–109)
CHLORIDE SERPL-SCNC: 98 MMOL/L (ref 94–109)
CHLORIDE SERPLBLD-SCNC: 110 MMOL/L (ref 98–110)
CHOLEST SERPL-MCNC: 125 MG/DL
CHOLEST SERPL-MCNC: 151 MG/DL
CHOLEST SERPL-MCNC: 152 MG/DL
CK SERPL-CCNC: 165 U/L (ref 30–300)
CK SERPL-CCNC: 175 U/L (ref 30–300)
CO2 SERPL-SCNC: 11 MMOL/L (ref 20–32)
CO2 SERPL-SCNC: 11 MMOL/L (ref 20–32)
CO2 SERPL-SCNC: 15 MMOL/L (ref 20–32)
CO2 SERPL-SCNC: 16 MMOL/L (ref 20–32)
CO2 SERPL-SCNC: 17 MMOL/L (ref 20–32)
CO2 SERPL-SCNC: 18 MMOL/L (ref 20–32)
CO2 SERPL-SCNC: 19 MMOL/L (ref 20–32)
CO2 SERPL-SCNC: 19 MMOL/L (ref 21–31)
CO2 SERPL-SCNC: 20 MMOL/L (ref 20–32)
CO2 SERPL-SCNC: 21 MMOL/L (ref 20–32)
CO2 SERPL-SCNC: 22 MMOL/L (ref 20–32)
CO2 SERPL-SCNC: 23 MMOL/L (ref 20–32)
CO2 SERPL-SCNC: 23 MMOL/L (ref 20–32)
CO2 SERPL-SCNC: 7 MMOL/L (ref 20–32)
CO2 SERPL-SCNC: 9 MMOL/L (ref 20–32)
COLOR UR AUTO: ABNORMAL
COLOR UR AUTO: YELLOW
CREAT BLD-MCNC: 2.1 MG/DL (ref 0.66–1.25)
CREAT SERPL-MCNC: 1.5 MG/DL (ref 0.66–1.25)
CREAT SERPL-MCNC: 1.73 MG/DL (ref 0.66–1.25)
CREAT SERPL-MCNC: 1.77 MG/DL (ref 0.66–1.25)
CREAT SERPL-MCNC: 1.8 MG/DL (ref 0.66–1.25)
CREAT SERPL-MCNC: 1.8 MG/DL (ref 0.66–1.25)
CREAT SERPL-MCNC: 1.82 MG/DL (ref 0.66–1.25)
CREAT SERPL-MCNC: 1.83 MG/DL (ref 0.66–1.25)
CREAT SERPL-MCNC: 1.84 MG/DL (ref 0.66–1.25)
CREAT SERPL-MCNC: 1.86 MG/DL (ref 0.66–1.25)
CREAT SERPL-MCNC: 1.86 MG/DL (ref 0.66–1.25)
CREAT SERPL-MCNC: 1.87 MG/DL (ref 0.66–1.25)
CREAT SERPL-MCNC: 1.88 MG/DL (ref 0.66–1.25)
CREAT SERPL-MCNC: 1.93 MG/DL (ref 0.66–1.25)
CREAT SERPL-MCNC: 1.93 MG/DL (ref 0.66–1.25)
CREAT SERPL-MCNC: 1.94 MG/DL (ref 0.66–1.25)
CREAT SERPL-MCNC: 1.94 MG/DL (ref 0.72–1.25)
CREAT SERPL-MCNC: 1.96 MG/DL (ref 0.66–1.25)
CREAT SERPL-MCNC: 1.97 MG/DL (ref 0.66–1.25)
CREAT SERPL-MCNC: 1.98 MG/DL (ref 0.66–1.25)
CREAT SERPL-MCNC: 2 MG/DL (ref 0.66–1.25)
CREAT SERPL-MCNC: 2.01 MG/DL (ref 0.66–1.25)
CREAT SERPL-MCNC: 2.01 MG/DL (ref 0.66–1.25)
CREAT SERPL-MCNC: 2.02 MG/DL (ref 0.66–1.25)
CREAT SERPL-MCNC: 2.03 MG/DL (ref 0.66–1.25)
CREAT SERPL-MCNC: 2.05 MG/DL (ref 0.66–1.25)
CREAT SERPL-MCNC: 2.05 MG/DL (ref 0.66–1.25)
CREAT SERPL-MCNC: 2.08 MG/DL (ref 0.66–1.25)
CREAT SERPL-MCNC: 2.08 MG/DL (ref 0.66–1.25)
CREAT SERPL-MCNC: 2.1 MG/DL (ref 0.66–1.25)
CREAT SERPL-MCNC: 2.13 MG/DL (ref 0.66–1.25)
CREAT SERPL-MCNC: 2.14 MG/DL (ref 0.66–1.25)
CREAT SERPL-MCNC: 2.2 MG/DL (ref 0.66–1.25)
CREAT SERPL-MCNC: 2.23 MG/DL (ref 0.66–1.25)
CREAT SERPL-MCNC: 2.3 MG/DL (ref 0.66–1.25)
CREAT SERPL-MCNC: 2.31 MG/DL (ref 0.66–1.25)
CREAT SERPL-MCNC: 2.72 MG/DL (ref 0.66–1.25)
CREAT SERPL-MCNC: 2.96 MG/DL (ref 0.66–1.25)
CREAT SERPL-MCNC: 2.99 MG/DL (ref 0.66–1.25)
CREAT SERPL-MCNC: 3.31 MG/DL (ref 0.66–1.25)
CREAT SERPL-MCNC: 3.72 MG/DL (ref 0.66–1.25)
CREAT SERPL-MCNC: 3.74 MG/DL (ref 0.66–1.25)
CREAT SERPL-MCNC: 3.88 MG/DL (ref 0.66–1.25)
CREAT SERPL-MCNC: 4.58 MG/DL (ref 0.66–1.25)
CREAT UR-MCNC: 36 MG/DL
CRP SERPL HS-MCNC: 39.2 MG/L
DIFFERENTIAL METHOD BLD: ABNORMAL
EOSINOPHIL # BLD AUTO: 0 10E9/L (ref 0–0.7)
EOSINOPHIL # BLD AUTO: 0.5 10E9/L (ref 0–0.7)
EOSINOPHIL # BLD AUTO: 0.5 10E9/L (ref 0–0.7)
EOSINOPHIL # BLD AUTO: 0.6 10E9/L (ref 0–0.7)
EOSINOPHIL # BLD AUTO: 0.7 10E9/L (ref 0–0.7)
EOSINOPHIL # BLD AUTO: 0.7 10E9/L (ref 0–0.7)
EOSINOPHIL # BLD AUTO: 0.8 10E9/L (ref 0–0.7)
EOSINOPHIL # BLD AUTO: 1 10E9/L (ref 0–0.7)
EOSINOPHIL # BLD AUTO: 1.1 10E9/L (ref 0–0.7)
EOSINOPHIL NFR BLD AUTO: 0.1 %
EOSINOPHIL NFR BLD AUTO: 10 %
EOSINOPHIL NFR BLD AUTO: 10.6 %
EOSINOPHIL NFR BLD AUTO: 4.5 %
EOSINOPHIL NFR BLD AUTO: 4.6 %
EOSINOPHIL NFR BLD AUTO: 6.9 %
EOSINOPHIL NFR BLD AUTO: 7.1 %
EOSINOPHIL NFR BLD AUTO: 7.3 %
EOSINOPHIL NFR BLD AUTO: 7.8 %
EOSINOPHIL NFR BLD AUTO: 7.8 %
EOSINOPHIL NFR BLD AUTO: 7.9 %
EOSINOPHIL NFR BLD AUTO: 8.5 %
EOSINOPHIL NFR BLD AUTO: 8.7 %
EOSINOPHIL NFR BLD AUTO: 8.7 %
EOSINOPHIL NFR BLD AUTO: 9 %
EOSINOPHIL NFR BLD AUTO: 9.4 %
EOSINOPHIL NFR BLD AUTO: 9.6 %
ERYTHROCYTE [DISTWIDTH] IN BLOOD BY AUTOMATED COUNT: 18.2 % (ref 10–15)
ERYTHROCYTE [DISTWIDTH] IN BLOOD BY AUTOMATED COUNT: 18.5 % (ref 10–15)
ERYTHROCYTE [DISTWIDTH] IN BLOOD BY AUTOMATED COUNT: 18.6 % (ref 10–15)
ERYTHROCYTE [DISTWIDTH] IN BLOOD BY AUTOMATED COUNT: 19 % (ref 10–15)
ERYTHROCYTE [DISTWIDTH] IN BLOOD BY AUTOMATED COUNT: 20.3 % (ref 10–15)
ERYTHROCYTE [DISTWIDTH] IN BLOOD BY AUTOMATED COUNT: 20.6 % (ref 10–15)
ERYTHROCYTE [DISTWIDTH] IN BLOOD BY AUTOMATED COUNT: 20.7 % (ref 10–15)
ERYTHROCYTE [DISTWIDTH] IN BLOOD BY AUTOMATED COUNT: 20.8 % (ref 10–15)
ERYTHROCYTE [DISTWIDTH] IN BLOOD BY AUTOMATED COUNT: 20.9 % (ref 10–15)
ERYTHROCYTE [DISTWIDTH] IN BLOOD BY AUTOMATED COUNT: 21.2 % (ref 10–15)
ERYTHROCYTE [DISTWIDTH] IN BLOOD BY AUTOMATED COUNT: 21.4 % (ref 10–15)
ERYTHROCYTE [DISTWIDTH] IN BLOOD BY AUTOMATED COUNT: 21.5 % (ref 10–15)
ERYTHROCYTE [DISTWIDTH] IN BLOOD BY AUTOMATED COUNT: 21.7 % (ref 10–15)
ERYTHROCYTE [DISTWIDTH] IN BLOOD BY AUTOMATED COUNT: 21.8 % (ref 10–15)
ERYTHROCYTE [DISTWIDTH] IN BLOOD BY AUTOMATED COUNT: 21.8 % (ref 10–15)
ERYTHROCYTE [DISTWIDTH] IN BLOOD BY AUTOMATED COUNT: 21.9 % (ref 10–15)
ERYTHROCYTE [DISTWIDTH] IN BLOOD BY AUTOMATED COUNT: 21.9 % (ref 10–15)
ERYTHROCYTE [DISTWIDTH] IN BLOOD BY AUTOMATED COUNT: 22.2 % (ref 10–15)
ERYTHROCYTE [DISTWIDTH] IN BLOOD BY AUTOMATED COUNT: 22.2 % (ref 10–15)
ERYTHROCYTE [DISTWIDTH] IN BLOOD BY AUTOMATED COUNT: 22.3 % (ref 10–15)
ERYTHROCYTE [DISTWIDTH] IN BLOOD BY AUTOMATED COUNT: 22.3 % (ref 10–15)
ERYTHROCYTE [DISTWIDTH] IN BLOOD BY AUTOMATED COUNT: 22.5 % (ref 10–15)
ERYTHROCYTE [DISTWIDTH] IN BLOOD BY AUTOMATED COUNT: 22.6 % (ref 10–15)
ERYTHROCYTE [DISTWIDTH] IN BLOOD BY AUTOMATED COUNT: 22.6 % (ref 10–15)
ERYTHROCYTE [DISTWIDTH] IN BLOOD BY AUTOMATED COUNT: 22.7 % (ref 10–15)
ERYTHROCYTE [DISTWIDTH] IN BLOOD BY AUTOMATED COUNT: 22.7 % (ref 10–15)
ERYTHROCYTE [DISTWIDTH] IN BLOOD BY AUTOMATED COUNT: 23.1 % (ref 10–15)
ERYTHROCYTE [DISTWIDTH] IN BLOOD BY AUTOMATED COUNT: 23.1 % (ref 10–15)
ERYTHROCYTE [DISTWIDTH] IN BLOOD BY AUTOMATED COUNT: 23.2 % (ref 10–15)
ERYTHROCYTE [DISTWIDTH] IN BLOOD BY AUTOMATED COUNT: 23.4 % (ref 10–15)
ERYTHROCYTE [DISTWIDTH] IN BLOOD BY AUTOMATED COUNT: 24.7 % (ref 10–15)
ERYTHROCYTE [DISTWIDTH] IN BLOOD BY AUTOMATED COUNT: 25.9 % (ref 10–15)
ERYTHROCYTE [DISTWIDTH] IN BLOOD BY AUTOMATED COUNT: 26 % (ref 10–15)
ERYTHROCYTE [DISTWIDTH] IN BLOOD BY AUTOMATED COUNT: 26.3 % (ref 10–15)
ERYTHROCYTE [DISTWIDTH] IN BLOOD BY AUTOMATED COUNT: 26.8 % (ref 10–15)
ERYTHROCYTE [DISTWIDTH] IN BLOOD BY AUTOMATED COUNT: 27.3 % (ref 10–15)
ERYTHROCYTE [DISTWIDTH] IN BLOOD BY AUTOMATED COUNT: 27.7 % (ref 10–15)
ERYTHROCYTE [DISTWIDTH] IN BLOOD BY AUTOMATED COUNT: 27.9 % (ref 10–15)
ERYTHROCYTE [DISTWIDTH] IN BLOOD BY AUTOMATED COUNT: 27.9 % (ref 10–15)
ERYTHROCYTE [DISTWIDTH] IN BLOOD BY AUTOMATED COUNT: 28 % (ref 10–15)
ERYTHROCYTE [DISTWIDTH] IN BLOOD BY AUTOMATED COUNT: ABNORMAL % (ref 10–15)
FERRITIN SERPL-MCNC: 103 NG/ML (ref 26–388)
FERRITIN SERPL-MCNC: 75 NG/ML (ref 26–388)
GFR SERPL CREATININE-BSD FRML MDRD: 13 ML/MIN/1.7M2
GFR SERPL CREATININE-BSD FRML MDRD: 16 ML/MIN/1.7M2
GFR SERPL CREATININE-BSD FRML MDRD: 19 ML/MIN/1.7M2
GFR SERPL CREATININE-BSD FRML MDRD: 21 ML/MIN/1.7M2
GFR SERPL CREATININE-BSD FRML MDRD: 21 ML/MIN/1.7M2
GFR SERPL CREATININE-BSD FRML MDRD: 23 ML/MIN/1.7M2
GFR SERPL CREATININE-BSD FRML MDRD: 28 ML/MIN/1.7M2
GFR SERPL CREATININE-BSD FRML MDRD: 28 ML/MIN/1.7M2
GFR SERPL CREATININE-BSD FRML MDRD: 30 ML/MIN/1.7M2
GFR SERPL CREATININE-BSD FRML MDRD: 30 ML/MIN/1.7M2
GFR SERPL CREATININE-BSD FRML MDRD: 31 ML/MIN/1.7M2
GFR SERPL CREATININE-BSD FRML MDRD: 32 ML/MIN/1.7M2
GFR SERPL CREATININE-BSD FRML MDRD: 33 ML/MIN/1.7M2
GFR SERPL CREATININE-BSD FRML MDRD: 34 ML/MIN/1.7M2
GFR SERPL CREATININE-BSD FRML MDRD: 35 ML/MIN/1.73M2
GFR SERPL CREATININE-BSD FRML MDRD: 35 ML/MIN/1.7M2
GFR SERPL CREATININE-BSD FRML MDRD: 36 ML/MIN/1.7M2
GFR SERPL CREATININE-BSD FRML MDRD: 37 ML/MIN/1.7M2
GFR SERPL CREATININE-BSD FRML MDRD: 38 ML/MIN/1.7M2
GFR SERPL CREATININE-BSD FRML MDRD: 38 ML/MIN/1.7M2
GFR SERPL CREATININE-BSD FRML MDRD: 39 ML/MIN/1.7M2
GFR SERPL CREATININE-BSD FRML MDRD: 40 ML/MIN/1.7M2
GFR SERPL CREATININE-BSD FRML MDRD: 47 ML/MIN/1.7M2
GLUCOSE BLDC GLUCOMTR-MCNC: 103 MG/DL (ref 70–99)
GLUCOSE BLDC GLUCOMTR-MCNC: 104 MG/DL (ref 70–99)
GLUCOSE BLDC GLUCOMTR-MCNC: 105 MG/DL (ref 70–99)
GLUCOSE BLDC GLUCOMTR-MCNC: 106 MG/DL (ref 70–99)
GLUCOSE BLDC GLUCOMTR-MCNC: 108 MG/DL (ref 70–99)
GLUCOSE BLDC GLUCOMTR-MCNC: 110 MG/DL (ref 70–99)
GLUCOSE BLDC GLUCOMTR-MCNC: 111 MG/DL (ref 70–99)
GLUCOSE BLDC GLUCOMTR-MCNC: 114 MG/DL (ref 70–99)
GLUCOSE BLDC GLUCOMTR-MCNC: 121 MG/DL (ref 70–99)
GLUCOSE BLDC GLUCOMTR-MCNC: 123 MG/DL (ref 70–99)
GLUCOSE BLDC GLUCOMTR-MCNC: 123 MG/DL (ref 70–99)
GLUCOSE BLDC GLUCOMTR-MCNC: 124 MG/DL (ref 70–99)
GLUCOSE BLDC GLUCOMTR-MCNC: 124 MG/DL (ref 70–99)
GLUCOSE BLDC GLUCOMTR-MCNC: 126 MG/DL (ref 70–99)
GLUCOSE BLDC GLUCOMTR-MCNC: 127 MG/DL (ref 70–99)
GLUCOSE BLDC GLUCOMTR-MCNC: 129 MG/DL (ref 70–99)
GLUCOSE BLDC GLUCOMTR-MCNC: 131 MG/DL (ref 70–99)
GLUCOSE BLDC GLUCOMTR-MCNC: 132 MG/DL (ref 70–99)
GLUCOSE BLDC GLUCOMTR-MCNC: 135 MG/DL (ref 70–99)
GLUCOSE BLDC GLUCOMTR-MCNC: 135 MG/DL (ref 70–99)
GLUCOSE BLDC GLUCOMTR-MCNC: 136 MG/DL (ref 70–99)
GLUCOSE BLDC GLUCOMTR-MCNC: 136 MG/DL (ref 70–99)
GLUCOSE BLDC GLUCOMTR-MCNC: 137 MG/DL (ref 70–99)
GLUCOSE BLDC GLUCOMTR-MCNC: 137 MG/DL (ref 70–99)
GLUCOSE BLDC GLUCOMTR-MCNC: 138 MG/DL (ref 70–99)
GLUCOSE BLDC GLUCOMTR-MCNC: 139 MG/DL (ref 70–99)
GLUCOSE BLDC GLUCOMTR-MCNC: 140 MG/DL (ref 70–99)
GLUCOSE BLDC GLUCOMTR-MCNC: 140 MG/DL (ref 70–99)
GLUCOSE BLDC GLUCOMTR-MCNC: 141 MG/DL (ref 70–99)
GLUCOSE BLDC GLUCOMTR-MCNC: 141 MG/DL (ref 70–99)
GLUCOSE BLDC GLUCOMTR-MCNC: 143 MG/DL (ref 70–99)
GLUCOSE BLDC GLUCOMTR-MCNC: 144 MG/DL (ref 70–99)
GLUCOSE BLDC GLUCOMTR-MCNC: 145 MG/DL (ref 70–99)
GLUCOSE BLDC GLUCOMTR-MCNC: 147 MG/DL (ref 70–99)
GLUCOSE BLDC GLUCOMTR-MCNC: 147 MG/DL (ref 70–99)
GLUCOSE BLDC GLUCOMTR-MCNC: 149 MG/DL (ref 70–99)
GLUCOSE BLDC GLUCOMTR-MCNC: 151 MG/DL (ref 70–99)
GLUCOSE BLDC GLUCOMTR-MCNC: 151 MG/DL (ref 70–99)
GLUCOSE BLDC GLUCOMTR-MCNC: 152 MG/DL (ref 70–99)
GLUCOSE BLDC GLUCOMTR-MCNC: 153 MG/DL (ref 70–99)
GLUCOSE BLDC GLUCOMTR-MCNC: 155 MG/DL (ref 70–99)
GLUCOSE BLDC GLUCOMTR-MCNC: 156 MG/DL (ref 70–99)
GLUCOSE BLDC GLUCOMTR-MCNC: 157 MG/DL (ref 70–99)
GLUCOSE BLDC GLUCOMTR-MCNC: 157 MG/DL (ref 70–99)
GLUCOSE BLDC GLUCOMTR-MCNC: 160 MG/DL (ref 70–99)
GLUCOSE BLDC GLUCOMTR-MCNC: 160 MG/DL (ref 70–99)
GLUCOSE BLDC GLUCOMTR-MCNC: 161 MG/DL (ref 70–99)
GLUCOSE BLDC GLUCOMTR-MCNC: 162 MG/DL (ref 70–99)
GLUCOSE BLDC GLUCOMTR-MCNC: 163 MG/DL (ref 70–99)
GLUCOSE BLDC GLUCOMTR-MCNC: 163 MG/DL (ref 70–99)
GLUCOSE BLDC GLUCOMTR-MCNC: 164 MG/DL (ref 70–99)
GLUCOSE BLDC GLUCOMTR-MCNC: 164 MG/DL (ref 70–99)
GLUCOSE BLDC GLUCOMTR-MCNC: 165 MG/DL (ref 70–99)
GLUCOSE BLDC GLUCOMTR-MCNC: 167 MG/DL (ref 70–99)
GLUCOSE BLDC GLUCOMTR-MCNC: 167 MG/DL (ref 70–99)
GLUCOSE BLDC GLUCOMTR-MCNC: 168 MG/DL (ref 70–99)
GLUCOSE BLDC GLUCOMTR-MCNC: 170 MG/DL (ref 70–99)
GLUCOSE BLDC GLUCOMTR-MCNC: 171 MG/DL (ref 70–99)
GLUCOSE BLDC GLUCOMTR-MCNC: 171 MG/DL (ref 70–99)
GLUCOSE BLDC GLUCOMTR-MCNC: 172 MG/DL (ref 70–99)
GLUCOSE BLDC GLUCOMTR-MCNC: 172 MG/DL (ref 70–99)
GLUCOSE BLDC GLUCOMTR-MCNC: 173 MG/DL (ref 70–99)
GLUCOSE BLDC GLUCOMTR-MCNC: 174 MG/DL (ref 70–99)
GLUCOSE BLDC GLUCOMTR-MCNC: 176 MG/DL (ref 70–99)
GLUCOSE BLDC GLUCOMTR-MCNC: 176 MG/DL (ref 70–99)
GLUCOSE BLDC GLUCOMTR-MCNC: 177 MG/DL (ref 70–99)
GLUCOSE BLDC GLUCOMTR-MCNC: 177 MG/DL (ref 70–99)
GLUCOSE BLDC GLUCOMTR-MCNC: 178 MG/DL (ref 70–99)
GLUCOSE BLDC GLUCOMTR-MCNC: 178 MG/DL (ref 70–99)
GLUCOSE BLDC GLUCOMTR-MCNC: 179 MG/DL (ref 70–99)
GLUCOSE BLDC GLUCOMTR-MCNC: 180 MG/DL (ref 70–99)
GLUCOSE BLDC GLUCOMTR-MCNC: 180 MG/DL (ref 70–99)
GLUCOSE BLDC GLUCOMTR-MCNC: 181 MG/DL (ref 70–99)
GLUCOSE BLDC GLUCOMTR-MCNC: 182 MG/DL (ref 70–99)
GLUCOSE BLDC GLUCOMTR-MCNC: 183 MG/DL (ref 70–99)
GLUCOSE BLDC GLUCOMTR-MCNC: 184 MG/DL (ref 70–99)
GLUCOSE BLDC GLUCOMTR-MCNC: 185 MG/DL (ref 70–99)
GLUCOSE BLDC GLUCOMTR-MCNC: 185 MG/DL (ref 70–99)
GLUCOSE BLDC GLUCOMTR-MCNC: 186 MG/DL (ref 70–99)
GLUCOSE BLDC GLUCOMTR-MCNC: 186 MG/DL (ref 70–99)
GLUCOSE BLDC GLUCOMTR-MCNC: 187 MG/DL (ref 70–99)
GLUCOSE BLDC GLUCOMTR-MCNC: 188 MG/DL (ref 70–99)
GLUCOSE BLDC GLUCOMTR-MCNC: 189 MG/DL (ref 70–99)
GLUCOSE BLDC GLUCOMTR-MCNC: 189 MG/DL (ref 70–99)
GLUCOSE BLDC GLUCOMTR-MCNC: 190 MG/DL (ref 70–99)
GLUCOSE BLDC GLUCOMTR-MCNC: 191 MG/DL (ref 70–99)
GLUCOSE BLDC GLUCOMTR-MCNC: 192 MG/DL (ref 70–99)
GLUCOSE BLDC GLUCOMTR-MCNC: 192 MG/DL (ref 70–99)
GLUCOSE BLDC GLUCOMTR-MCNC: 193 MG/DL (ref 70–99)
GLUCOSE BLDC GLUCOMTR-MCNC: 193 MG/DL (ref 70–99)
GLUCOSE BLDC GLUCOMTR-MCNC: 194 MG/DL (ref 70–99)
GLUCOSE BLDC GLUCOMTR-MCNC: 195 MG/DL (ref 70–99)
GLUCOSE BLDC GLUCOMTR-MCNC: 196 MG/DL (ref 70–99)
GLUCOSE BLDC GLUCOMTR-MCNC: 197 MG/DL (ref 70–99)
GLUCOSE BLDC GLUCOMTR-MCNC: 197 MG/DL (ref 70–99)
GLUCOSE BLDC GLUCOMTR-MCNC: 198 MG/DL (ref 70–99)
GLUCOSE BLDC GLUCOMTR-MCNC: 199 MG/DL (ref 70–99)
GLUCOSE BLDC GLUCOMTR-MCNC: 200 MG/DL (ref 70–99)
GLUCOSE BLDC GLUCOMTR-MCNC: 202 MG/DL (ref 70–99)
GLUCOSE BLDC GLUCOMTR-MCNC: 202 MG/DL (ref 70–99)
GLUCOSE BLDC GLUCOMTR-MCNC: 207 MG/DL (ref 70–99)
GLUCOSE BLDC GLUCOMTR-MCNC: 208 MG/DL (ref 70–99)
GLUCOSE BLDC GLUCOMTR-MCNC: 208 MG/DL (ref 70–99)
GLUCOSE BLDC GLUCOMTR-MCNC: 209 MG/DL (ref 70–99)
GLUCOSE BLDC GLUCOMTR-MCNC: 211 MG/DL (ref 70–99)
GLUCOSE BLDC GLUCOMTR-MCNC: 211 MG/DL (ref 70–99)
GLUCOSE BLDC GLUCOMTR-MCNC: 212 MG/DL (ref 70–99)
GLUCOSE BLDC GLUCOMTR-MCNC: 215 MG/DL (ref 70–99)
GLUCOSE BLDC GLUCOMTR-MCNC: 216 MG/DL (ref 70–99)
GLUCOSE BLDC GLUCOMTR-MCNC: 218 MG/DL (ref 70–99)
GLUCOSE BLDC GLUCOMTR-MCNC: 219 MG/DL (ref 70–99)
GLUCOSE BLDC GLUCOMTR-MCNC: 220 MG/DL (ref 70–99)
GLUCOSE BLDC GLUCOMTR-MCNC: 221 MG/DL (ref 70–99)
GLUCOSE BLDC GLUCOMTR-MCNC: 222 MG/DL (ref 70–99)
GLUCOSE BLDC GLUCOMTR-MCNC: 223 MG/DL (ref 70–99)
GLUCOSE BLDC GLUCOMTR-MCNC: 223 MG/DL (ref 70–99)
GLUCOSE BLDC GLUCOMTR-MCNC: 227 MG/DL (ref 70–99)
GLUCOSE BLDC GLUCOMTR-MCNC: 229 MG/DL (ref 70–99)
GLUCOSE BLDC GLUCOMTR-MCNC: 230 MG/DL (ref 70–99)
GLUCOSE BLDC GLUCOMTR-MCNC: 237 MG/DL (ref 70–99)
GLUCOSE BLDC GLUCOMTR-MCNC: 237 MG/DL (ref 70–99)
GLUCOSE BLDC GLUCOMTR-MCNC: 239 MG/DL (ref 70–99)
GLUCOSE BLDC GLUCOMTR-MCNC: 241 MG/DL (ref 70–99)
GLUCOSE BLDC GLUCOMTR-MCNC: 241 MG/DL (ref 70–99)
GLUCOSE BLDC GLUCOMTR-MCNC: 246 MG/DL (ref 70–99)
GLUCOSE BLDC GLUCOMTR-MCNC: 255 MG/DL (ref 70–99)
GLUCOSE BLDC GLUCOMTR-MCNC: 255 MG/DL (ref 70–99)
GLUCOSE BLDC GLUCOMTR-MCNC: 266 MG/DL (ref 70–99)
GLUCOSE BLDC GLUCOMTR-MCNC: 267 MG/DL (ref 70–99)
GLUCOSE BLDC GLUCOMTR-MCNC: 272 MG/DL (ref 70–99)
GLUCOSE BLDC GLUCOMTR-MCNC: 290 MG/DL (ref 70–99)
GLUCOSE BLDC GLUCOMTR-MCNC: 304 MG/DL (ref 70–99)
GLUCOSE BLDC GLUCOMTR-MCNC: 319 MG/DL (ref 70–99)
GLUCOSE BLDC GLUCOMTR-MCNC: 329 MG/DL (ref 70–99)
GLUCOSE BLDC GLUCOMTR-MCNC: 335 MG/DL (ref 70–99)
GLUCOSE BLDC GLUCOMTR-MCNC: 346 MG/DL (ref 70–99)
GLUCOSE BLDC GLUCOMTR-MCNC: 349 MG/DL (ref 70–99)
GLUCOSE BLDC GLUCOMTR-MCNC: 358 MG/DL (ref 70–99)
GLUCOSE BLDC GLUCOMTR-MCNC: 367 MG/DL (ref 70–99)
GLUCOSE BLDC GLUCOMTR-MCNC: 392 MG/DL (ref 70–99)
GLUCOSE BLDC GLUCOMTR-MCNC: 392 MG/DL (ref 70–99)
GLUCOSE BLDC GLUCOMTR-MCNC: 398 MG/DL (ref 70–99)
GLUCOSE BLDC GLUCOMTR-MCNC: 404 MG/DL (ref 70–99)
GLUCOSE BLDC GLUCOMTR-MCNC: 65 MG/DL (ref 70–99)
GLUCOSE BLDC GLUCOMTR-MCNC: 70 MG/DL (ref 70–99)
GLUCOSE BLDC GLUCOMTR-MCNC: 80 MG/DL (ref 70–99)
GLUCOSE BLDC GLUCOMTR-MCNC: 90 MG/DL (ref 70–99)
GLUCOSE BLDC GLUCOMTR-MCNC: 99 MG/DL (ref 70–99)
GLUCOSE SERPL-MCNC: 104 MG/DL (ref 70–99)
GLUCOSE SERPL-MCNC: 108 MG/DL (ref 70–99)
GLUCOSE SERPL-MCNC: 109 MG/DL (ref 70–99)
GLUCOSE SERPL-MCNC: 110 MG/DL (ref 70–99)
GLUCOSE SERPL-MCNC: 111 MG/DL (ref 70–99)
GLUCOSE SERPL-MCNC: 114 MG/DL (ref 70–99)
GLUCOSE SERPL-MCNC: 119 MG/DL (ref 70–99)
GLUCOSE SERPL-MCNC: 120 MG/DL (ref 70–99)
GLUCOSE SERPL-MCNC: 126 MG/DL (ref 70–99)
GLUCOSE SERPL-MCNC: 126 MG/DL (ref 70–99)
GLUCOSE SERPL-MCNC: 134 MG/DL (ref 70–99)
GLUCOSE SERPL-MCNC: 135 MG/DL (ref 70–99)
GLUCOSE SERPL-MCNC: 136 MG/DL (ref 70–99)
GLUCOSE SERPL-MCNC: 136 MG/DL (ref 70–99)
GLUCOSE SERPL-MCNC: 143 MG/DL (ref 70–99)
GLUCOSE SERPL-MCNC: 144 MG/DL (ref 70–99)
GLUCOSE SERPL-MCNC: 147 MG/DL (ref 70–99)
GLUCOSE SERPL-MCNC: 147 MG/DL (ref 70–99)
GLUCOSE SERPL-MCNC: 150 MG/DL (ref 70–99)
GLUCOSE SERPL-MCNC: 153 MG/DL (ref 70–99)
GLUCOSE SERPL-MCNC: 158 MG/DL (ref 70–99)
GLUCOSE SERPL-MCNC: 160 MG/DL (ref 70–99)
GLUCOSE SERPL-MCNC: 162 MG/DL (ref 70–99)
GLUCOSE SERPL-MCNC: 163 MG/DL (ref 70–99)
GLUCOSE SERPL-MCNC: 165 MG/DL (ref 70–99)
GLUCOSE SERPL-MCNC: 168 MG/DL (ref 70–99)
GLUCOSE SERPL-MCNC: 169 MG/DL (ref 70–99)
GLUCOSE SERPL-MCNC: 170 MG/DL (ref 70–99)
GLUCOSE SERPL-MCNC: 171 MG/DL (ref 70–99)
GLUCOSE SERPL-MCNC: 174 MG/DL (ref 70–99)
GLUCOSE SERPL-MCNC: 175 MG/DL (ref 70–99)
GLUCOSE SERPL-MCNC: 177 MG/DL (ref 70–99)
GLUCOSE SERPL-MCNC: 181 MG/DL (ref 70–99)
GLUCOSE SERPL-MCNC: 184 MG/DL (ref 70–99)
GLUCOSE SERPL-MCNC: 186 MG/DL (ref 70–99)
GLUCOSE SERPL-MCNC: 197 MG/DL (ref 70–99)
GLUCOSE SERPL-MCNC: 208 MG/DL (ref 70–99)
GLUCOSE SERPL-MCNC: 217 MG/DL (ref 70–99)
GLUCOSE SERPL-MCNC: 218 MG/DL (ref 65–100)
GLUCOSE SERPL-MCNC: 219 MG/DL (ref 70–99)
GLUCOSE SERPL-MCNC: 234 MG/DL (ref 70–99)
GLUCOSE SERPL-MCNC: 264 MG/DL (ref 70–99)
GLUCOSE SERPL-MCNC: 272 MG/DL (ref 70–99)
GLUCOSE SERPL-MCNC: 410 MG/DL (ref 70–99)
GLUCOSE SERPL-MCNC: 426 MG/DL (ref 70–99)
GLUCOSE SERPL-MCNC: 434 MG/DL (ref 70–99)
GLUCOSE SERPL-MCNC: 93 MG/DL (ref 70–99)
GLUCOSE UR STRIP-MCNC: 30 MG/DL
GLUCOSE UR STRIP-MCNC: 300 MG/DL
GLUCOSE UR STRIP-MCNC: >1000 MG/DL
GLUCOSE UR STRIP-MCNC: NEGATIVE MG/DL
HBA1C MFR BLD: 4.7 % (ref 0–5.6)
HCO3 BLD-SCNC: 8 MMOL/L (ref 21–28)
HCO3 BLDV-SCNC: 7 MMOL/L (ref 21–28)
HCT VFR BLD AUTO: 20.8 % (ref 40–53)
HCT VFR BLD AUTO: 23.8 % (ref 40–53)
HCT VFR BLD AUTO: 23.8 % (ref 40–53)
HCT VFR BLD AUTO: 24.1 % (ref 40–53)
HCT VFR BLD AUTO: 24.6 % (ref 40–53)
HCT VFR BLD AUTO: 25.1 % (ref 40–53)
HCT VFR BLD AUTO: 25.1 % (ref 40–53)
HCT VFR BLD AUTO: 25.3 % (ref 40–53)
HCT VFR BLD AUTO: 25.5 % (ref 40–53)
HCT VFR BLD AUTO: 26.4 % (ref 40–53)
HCT VFR BLD AUTO: 26.7 % (ref 40–53)
HCT VFR BLD AUTO: 26.8 % (ref 40–53)
HCT VFR BLD AUTO: 27 % (ref 40–53)
HCT VFR BLD AUTO: 27 % (ref 40–53)
HCT VFR BLD AUTO: 27.1 % (ref 40–53)
HCT VFR BLD AUTO: 27.2 % (ref 40–53)
HCT VFR BLD AUTO: 27.8 % (ref 40–53)
HCT VFR BLD AUTO: 28.2 % (ref 40–53)
HCT VFR BLD AUTO: 28.7 % (ref 40–53)
HCT VFR BLD AUTO: 28.8 % (ref 40–53)
HCT VFR BLD AUTO: 28.8 % (ref 40–53)
HCT VFR BLD AUTO: 28.9 % (ref 40–53)
HCT VFR BLD AUTO: 29.6 % (ref 40–53)
HCT VFR BLD AUTO: 29.7 % (ref 40–53)
HCT VFR BLD AUTO: 30 % (ref 40–53)
HCT VFR BLD AUTO: 30.1 % (ref 40–53)
HCT VFR BLD AUTO: 30.4 % (ref 40–53)
HCT VFR BLD AUTO: 30.5 % (ref 40–53)
HCT VFR BLD AUTO: 31.2 % (ref 40–53)
HCT VFR BLD AUTO: 31.7 % (ref 40–53)
HCT VFR BLD AUTO: 31.7 % (ref 40–53)
HCT VFR BLD AUTO: 31.8 % (ref 40–53)
HCT VFR BLD AUTO: 32.5 % (ref 40–53)
HCT VFR BLD AUTO: 32.9 % (ref 40–53)
HCT VFR BLD AUTO: 33.6 % (ref 40–53)
HCT VFR BLD AUTO: 34.4 % (ref 40–53)
HCT VFR BLD AUTO: 34.5 % (ref 40–53)
HCT VFR BLD AUTO: 34.6 % (ref 40–53)
HCT VFR BLD AUTO: 36.5 % (ref 40–53)
HCT VFR BLD AUTO: 36.5 % (ref 40–53)
HCT VFR BLD AUTO: 36.6 % (ref 40–53)
HDLC SERPL-MCNC: 32 MG/DL
HDLC SERPL-MCNC: 35 MG/DL
HDLC SERPL-MCNC: 38 MG/DL
HEMOCCULT STL QL IA: POSITIVE
HEMOCCULT STL QL: NEGATIVE
HGB BLD-MCNC: 10 G/DL (ref 13.3–17.7)
HGB BLD-MCNC: 10.2 G/DL (ref 13.3–17.7)
HGB BLD-MCNC: 10.4 G/DL (ref 13.3–17.7)
HGB BLD-MCNC: 10.5 G/DL (ref 13.3–17.7)
HGB BLD-MCNC: 11 G/DL (ref 13.3–17.7)
HGB BLD-MCNC: 11 G/DL (ref 13.3–17.7)
HGB BLD-MCNC: 11.2 G/DL (ref 13.3–17.7)
HGB BLD-MCNC: 6.4 G/DL (ref 13.3–17.7)
HGB BLD-MCNC: 7.1 G/DL (ref 13.3–17.7)
HGB BLD-MCNC: 7.2 G/DL (ref 13.3–17.7)
HGB BLD-MCNC: 7.3 G/DL (ref 13.3–17.7)
HGB BLD-MCNC: 7.3 G/DL (ref 13.3–17.7)
HGB BLD-MCNC: 7.4 G/DL (ref 13.3–17.7)
HGB BLD-MCNC: 7.5 G/DL (ref 13.3–17.7)
HGB BLD-MCNC: 7.6 G/DL (ref 13.3–17.7)
HGB BLD-MCNC: 7.8 G/DL (ref 13.3–17.7)
HGB BLD-MCNC: 7.8 G/DL (ref 13.3–17.7)
HGB BLD-MCNC: 8 G/DL (ref 13.3–17.7)
HGB BLD-MCNC: 8.2 G/DL (ref 13.3–17.7)
HGB BLD-MCNC: 8.4 G/DL (ref 13.3–17.7)
HGB BLD-MCNC: 8.5 G/DL (ref 13.3–17.7)
HGB BLD-MCNC: 9 G/DL (ref 13.3–17.7)
HGB BLD-MCNC: 9 G/DL (ref 13.3–17.7)
HGB BLD-MCNC: 9.1 G/DL (ref 13.3–17.7)
HGB BLD-MCNC: 9.2 G/DL (ref 13.3–17.7)
HGB BLD-MCNC: 9.6 G/DL (ref 13.3–17.7)
HGB BLD-MCNC: 9.7 G/DL (ref 13.3–17.7)
HGB BLD-MCNC: 9.8 G/DL (ref 13.3–17.7)
HGB BLD-MCNC: 9.8 G/DL (ref 13.3–17.7)
HGB BLD-MCNC: 9.9 G/DL (ref 13.3–17.7)
HGB FREE PLAS-MCNC: 30 MG/DL
HGB FREE PLAS-MCNC: 90 MG/DL
HGB UR QL STRIP: ABNORMAL
IMM GRANULOCYTES # BLD: 0 10E9/L (ref 0–0.4)
IMM GRANULOCYTES # BLD: 0.1 10E9/L (ref 0–0.4)
IMM GRANULOCYTES # BLD: 0.2 10E9/L (ref 0–0.4)
IMM GRANULOCYTES NFR BLD: 0.3 %
IMM GRANULOCYTES NFR BLD: 0.3 %
IMM GRANULOCYTES NFR BLD: 0.4 %
IMM GRANULOCYTES NFR BLD: 0.5 %
IMM GRANULOCYTES NFR BLD: 0.5 %
IMM GRANULOCYTES NFR BLD: 0.6 %
IMM GRANULOCYTES NFR BLD: 0.6 %
IMM GRANULOCYTES NFR BLD: 0.7 %
IMM GRANULOCYTES NFR BLD: 0.8 %
IMM GRANULOCYTES NFR BLD: 0.9 %
IMM GRANULOCYTES NFR BLD: 0.9 %
IMM GRANULOCYTES NFR BLD: 1.1 %
IMM GRANULOCYTES NFR BLD: 1.6 %
INR BLD: 2 (ref 0.86–1.14)
INR POINT OF CARE: 1.8 (ref 0.86–1.14)
INR POINT OF CARE: 1.8 (ref 0.86–1.14)
INR POINT OF CARE: 2.1 (ref 0.86–1.14)
INR POINT OF CARE: 2.6 (ref 0.86–1.14)
INR POINT OF CARE: 2.7 (ref 0.86–1.14)
INR POINT OF CARE: 3 (ref 0.86–1.14)
INR PPP: 1.3
INR PPP: 1.31 (ref 0.86–1.14)
INR PPP: 1.33 (ref 0.86–1.14)
INR PPP: 1.36 (ref 0.86–1.14)
INR PPP: 1.37 (ref 0.86–1.14)
INR PPP: 1.4 (ref 0.86–1.14)
INR PPP: 1.42 (ref 0.86–1.14)
INR PPP: 1.45 (ref 0.86–1.14)
INR PPP: 1.45 (ref 0.86–1.14)
INR PPP: 1.49 (ref 0.86–1.14)
INR PPP: 1.5
INR PPP: 1.54 (ref 0.86–1.14)
INR PPP: 1.54 (ref 0.86–1.14)
INR PPP: 1.55 (ref 0.86–1.14)
INR PPP: 1.6
INR PPP: 1.71 (ref 0.86–1.14)
INR PPP: 1.79 (ref 0.86–1.14)
INR PPP: 1.8
INR PPP: 1.81 (ref 0.86–1.14)
INR PPP: 1.9
INR PPP: 1.9
INR PPP: 1.94 (ref 0.86–1.14)
INR PPP: 2
INR PPP: 2
INR PPP: 2.04 (ref 0.86–1.14)
INR PPP: 2.08 (ref 0.86–1.14)
INR PPP: 2.1
INR PPP: 2.11 (ref 0.86–1.14)
INR PPP: 2.11 (ref 0.86–1.14)
INR PPP: 2.15 (ref 0.86–1.14)
INR PPP: 2.2
INR PPP: 2.2
INR PPP: 2.21 (ref 0.86–1.14)
INR PPP: 2.27 (ref 0.86–1.14)
INR PPP: 2.3
INR PPP: 2.33 (ref 0.86–1.14)
INR PPP: 2.34 (ref 0.86–1.14)
INR PPP: 2.38 (ref 0.86–1.14)
INR PPP: 2.39 (ref 0.86–1.14)
INR PPP: 2.4
INR PPP: 2.41 (ref 0.86–1.14)
INR PPP: 2.42 (ref 0.86–1.14)
INR PPP: 2.43 (ref 0.86–1.14)
INR PPP: 2.43 (ref 0.86–1.14)
INR PPP: 2.5
INR PPP: 2.51 (ref 0.86–1.14)
INR PPP: 2.58 (ref 0.86–1.14)
INR PPP: 2.6
INR PPP: 2.6
INR PPP: 2.62 (ref 0.86–1.14)
INR PPP: 2.62 (ref 0.86–1.14)
INR PPP: 2.7
INR PPP: 2.7
INR PPP: 2.72 (ref 0.86–1.14)
INR PPP: 2.77 (ref 0.86–1.14)
INR PPP: 2.8
INR PPP: 2.9 (ref 0.86–1.14)
INR PPP: 2.91 (ref 0.86–1.14)
INR PPP: 2.93 (ref 0.86–1.14)
INR PPP: 2.98 (ref 0.86–1.14)
INR PPP: 3
INR PPP: 3.13 (ref 0.86–1.14)
INR PPP: 3.29 (ref 0.86–1.14)
INR PPP: 3.56 (ref 0.86–1.14)
INR PPP: 3.59 (ref 0.86–1.14)
INTERPRETATION ECG - MUSE: NORMAL
IRON SATN MFR SERPL: 14 % (ref 15–46)
IRON SATN MFR SERPL: 8 % (ref 15–46)
IRON SERPL-MCNC: 21 UG/DL (ref 35–180)
IRON SERPL-MCNC: 30 UG/DL (ref 35–180)
KETONES UR STRIP-MCNC: 5 MG/DL
KETONES UR STRIP-MCNC: NEGATIVE MG/DL
LACTATE BLD-SCNC: 1.3 MMOL/L (ref 0.7–2)
LACTATE BLD-SCNC: 1.3 MMOL/L (ref 0.7–2)
LACTATE BLD-SCNC: 1.4 MMOL/L (ref 0.7–2)
LACTATE BLD-SCNC: 1.5 MMOL/L (ref 0.4–1.9)
LACTATE BLD-SCNC: 1.6 MMOL/L (ref 0.7–2)
LACTATE BLD-SCNC: 1.9 MMOL/L (ref 0.4–1.9)
LACTATE BLD-SCNC: 1.9 MMOL/L (ref 0.4–1.9)
LACTATE BLD-SCNC: 1.9 MMOL/L (ref 0.7–2)
LACTATE BLD-SCNC: 10.8 MMOL/L (ref 0.7–2)
LACTATE BLD-SCNC: 2.4 MMOL/L (ref 0.7–2)
LACTATE BLD-SCNC: 2.5 MMOL/L (ref 0.7–2)
LACTATE BLD-SCNC: 8.5 MMOL/L (ref 0.7–2)
LACTATE BLD-SCNC: 9.5 MMOL/L (ref 0.7–2)
LDH SERPL L TO P-CCNC: 1022 U/L (ref 85–227)
LDH SERPL L TO P-CCNC: 1075 U/L (ref 85–227)
LDH SERPL L TO P-CCNC: 1111 U/L (ref 85–227)
LDH SERPL L TO P-CCNC: 1192 U/L (ref 85–227)
LDH SERPL L TO P-CCNC: 1235 U/L (ref 85–227)
LDH SERPL L TO P-CCNC: 1273 U/L (ref 85–227)
LDH SERPL L TO P-CCNC: 1281 U/L (ref 85–227)
LDH SERPL L TO P-CCNC: 1376 U/L (ref 85–227)
LDH SERPL L TO P-CCNC: 1453 U/L (ref 85–227)
LDH SERPL L TO P-CCNC: 1627 U/L (ref 85–227)
LDH SERPL L TO P-CCNC: 1855 U/L (ref 85–227)
LDH SERPL L TO P-CCNC: 2038 U/L (ref 85–227)
LDH SERPL L TO P-CCNC: 2108 U/L (ref 85–227)
LDH SERPL L TO P-CCNC: 901 U/L (ref 85–227)
LDH SERPL L TO P-CCNC: 923 U/L (ref 85–227)
LDH SERPL L TO P-CCNC: 936 U/L (ref 85–227)
LDH SERPL L TO P-CCNC: 944 U/L (ref 85–227)
LDH SERPL L TO P-CCNC: NORMAL U/L (ref 85–227)
LDH SERPL L TO P-CCNC: NORMAL U/L (ref 85–227)
LDLC SERPL CALC-MCNC: 67 MG/DL
LDLC SERPL CALC-MCNC: 90 MG/DL
LDLC SERPL CALC-MCNC: 92 MG/DL
LEUKOCYTE ESTERASE UR QL STRIP: ABNORMAL
LEUKOCYTE ESTERASE UR QL STRIP: NEGATIVE
LEVETIRACETAM SERPL-MCNC: 27 UG/ML (ref 12–46)
LEVETIRACETAM SERPL-MCNC: 45 UG/ML (ref 12–46)
LIPASE SERPL-CCNC: 2212 U/L (ref 73–393)
LIPASE SERPL-CCNC: 87 U/L (ref 73–393)
LMWH PPP CHRO-ACNC: 0.12 IU/ML
LMWH PPP CHRO-ACNC: 0.19 IU/ML
LMWH PPP CHRO-ACNC: 0.2 IU/ML
LMWH PPP CHRO-ACNC: 0.22 IU/ML
LMWH PPP CHRO-ACNC: 0.25 IU/ML
LMWH PPP CHRO-ACNC: 0.32 IU/ML
LMWH PPP CHRO-ACNC: 0.33 IU/ML
LMWH PPP CHRO-ACNC: 0.33 IU/ML
LMWH PPP CHRO-ACNC: 0.35 IU/ML
LMWH PPP CHRO-ACNC: 0.39 IU/ML
LMWH PPP CHRO-ACNC: 0.41 IU/ML
LMWH PPP CHRO-ACNC: 0.43 IU/ML
LMWH PPP CHRO-ACNC: 0.44 IU/ML
LMWH PPP CHRO-ACNC: 0.45 IU/ML
LMWH PPP CHRO-ACNC: 0.47 IU/ML
LMWH PPP CHRO-ACNC: 0.59 IU/ML
LMWH PPP CHRO-ACNC: 0.84 IU/ML
LMWH PPP CHRO-ACNC: 0.87 IU/ML
LMWH PPP CHRO-ACNC: 0.9 IU/ML
LMWH PPP CHRO-ACNC: 1.04 IU/ML
LMWH PPP CHRO-ACNC: <0.1 IU/ML
LMWH PPP CHRO-ACNC: NORMAL IU/ML
LMWH PPP CHRO-ACNC: NORMAL IU/ML
LYMPHOCYTES # BLD AUTO: 0.8 10E9/L (ref 0.8–5.3)
LYMPHOCYTES # BLD AUTO: 0.9 10E9/L (ref 0.8–5.3)
LYMPHOCYTES # BLD AUTO: 0.9 10E9/L (ref 0.8–5.3)
LYMPHOCYTES # BLD AUTO: 1 10E9/L (ref 0.8–5.3)
LYMPHOCYTES # BLD AUTO: 1.1 10E9/L (ref 0.8–5.3)
LYMPHOCYTES # BLD AUTO: 1.1 10E9/L (ref 0.8–5.3)
LYMPHOCYTES # BLD AUTO: 1.2 10E9/L (ref 0.8–5.3)
LYMPHOCYTES # BLD AUTO: 1.2 10E9/L (ref 0.8–5.3)
LYMPHOCYTES # BLD AUTO: 1.3 10E9/L (ref 0.8–5.3)
LYMPHOCYTES # BLD AUTO: 1.3 10E9/L (ref 0.8–5.3)
LYMPHOCYTES # BLD AUTO: 1.4 10E9/L (ref 0.8–5.3)
LYMPHOCYTES # BLD AUTO: 1.4 10E9/L (ref 0.8–5.3)
LYMPHOCYTES # BLD AUTO: 2.2 10E9/L (ref 0.8–5.3)
LYMPHOCYTES NFR BLD AUTO: 11.1 %
LYMPHOCYTES NFR BLD AUTO: 11.9 %
LYMPHOCYTES NFR BLD AUTO: 13.9 %
LYMPHOCYTES NFR BLD AUTO: 13.9 %
LYMPHOCYTES NFR BLD AUTO: 14 %
LYMPHOCYTES NFR BLD AUTO: 14.1 %
LYMPHOCYTES NFR BLD AUTO: 14.5 %
LYMPHOCYTES NFR BLD AUTO: 14.9 %
LYMPHOCYTES NFR BLD AUTO: 15.2 %
LYMPHOCYTES NFR BLD AUTO: 15.3 %
LYMPHOCYTES NFR BLD AUTO: 15.7 %
LYMPHOCYTES NFR BLD AUTO: 16.6 %
LYMPHOCYTES NFR BLD AUTO: 17.4 %
LYMPHOCYTES NFR BLD AUTO: 22.1 %
LYMPHOCYTES NFR BLD AUTO: 8.3 %
LYMPHOCYTES NFR BLD AUTO: 9.2 %
LYMPHOCYTES NFR BLD AUTO: 9.7 %
Lab: ABNORMAL
Lab: NORMAL
MAGNESIUM SERPL-MCNC: 1.9 MG/DL (ref 1.6–2.3)
MAGNESIUM SERPL-MCNC: 2 MG/DL (ref 1.6–2.3)
MAGNESIUM SERPL-MCNC: 2.1 MG/DL (ref 1.6–2.3)
MAGNESIUM SERPL-MCNC: 2.2 MG/DL (ref 1.6–2.3)
MAGNESIUM SERPL-MCNC: 2.3 MG/DL (ref 1.6–2.3)
MAGNESIUM SERPL-MCNC: 2.4 MG/DL (ref 1.6–2.3)
MAGNESIUM SERPL-MCNC: 2.4 MG/DL (ref 1.6–2.3)
MAGNESIUM SERPL-MCNC: 2.5 MG/DL (ref 1.6–2.3)
MAGNESIUM SERPL-MCNC: 2.6 MG/DL (ref 1.6–2.3)
MAGNESIUM SERPL-MCNC: 2.7 MG/DL (ref 1.6–2.3)
MAGNESIUM SERPL-MCNC: 2.7 MG/DL (ref 1.6–2.3)
MCH RBC QN AUTO: 26.3 PG (ref 26.5–33)
MCH RBC QN AUTO: 26.4 PG (ref 26.5–33)
MCH RBC QN AUTO: 26.5 PG (ref 26.5–33)
MCH RBC QN AUTO: 26.5 PG (ref 26.5–33)
MCH RBC QN AUTO: 26.6 PG (ref 26.5–33)
MCH RBC QN AUTO: 26.7 PG (ref 26.5–33)
MCH RBC QN AUTO: 26.9 PG (ref 26.5–33)
MCH RBC QN AUTO: 27 PG (ref 26.5–33)
MCH RBC QN AUTO: 27 PG (ref 26.5–33)
MCH RBC QN AUTO: 27.2 PG (ref 26.5–33)
MCH RBC QN AUTO: 27.2 PG (ref 26.5–33)
MCH RBC QN AUTO: 27.3 PG (ref 26.5–33)
MCH RBC QN AUTO: 27.4 PG (ref 26.5–33)
MCH RBC QN AUTO: 27.4 PG (ref 26.5–33)
MCH RBC QN AUTO: 27.5 PG (ref 26.5–33)
MCH RBC QN AUTO: 27.6 PG (ref 26.5–33)
MCH RBC QN AUTO: 27.7 PG (ref 26.5–33)
MCH RBC QN AUTO: 27.7 PG (ref 26.5–33)
MCH RBC QN AUTO: 27.8 PG (ref 26.5–33)
MCH RBC QN AUTO: 27.9 PG (ref 26.5–33)
MCH RBC QN AUTO: 27.9 PG (ref 26.5–33)
MCH RBC QN AUTO: 28 PG (ref 26.5–33)
MCH RBC QN AUTO: 28 PG (ref 26.5–33)
MCH RBC QN AUTO: 28.1 PG (ref 26.5–33)
MCH RBC QN AUTO: 28.1 PG (ref 26.5–33)
MCH RBC QN AUTO: 28.2 PG (ref 26.5–33)
MCH RBC QN AUTO: 28.3 PG (ref 26.5–33)
MCH RBC QN AUTO: 28.5 PG (ref 26.5–33)
MCH RBC QN AUTO: 28.5 PG (ref 26.5–33)
MCH RBC QN AUTO: 28.7 PG (ref 26.5–33)
MCH RBC QN AUTO: 28.9 PG (ref 26.5–33)
MCH RBC QN AUTO: 28.9 PG (ref 26.5–33)
MCH RBC QN AUTO: 29.1 PG (ref 26.5–33)
MCH RBC QN AUTO: 29.2 PG (ref 26.5–33)
MCH RBC QN AUTO: 29.3 PG (ref 26.5–33)
MCH RBC QN AUTO: 29.4 PG (ref 26.5–33)
MCH RBC QN AUTO: 29.5 PG (ref 26.5–33)
MCH RBC QN AUTO: 29.9 PG (ref 26.5–33)
MCHC RBC AUTO-ENTMCNC: 26.8 G/DL (ref 31.5–36.5)
MCHC RBC AUTO-ENTMCNC: 26.9 G/DL (ref 31.5–36.5)
MCHC RBC AUTO-ENTMCNC: 27.6 G/DL (ref 31.5–36.5)
MCHC RBC AUTO-ENTMCNC: 27.7 G/DL (ref 31.5–36.5)
MCHC RBC AUTO-ENTMCNC: 27.7 G/DL (ref 31.5–36.5)
MCHC RBC AUTO-ENTMCNC: 27.9 G/DL (ref 31.5–36.5)
MCHC RBC AUTO-ENTMCNC: 28.1 G/DL (ref 31.5–36.5)
MCHC RBC AUTO-ENTMCNC: 28.4 G/DL (ref 31.5–36.5)
MCHC RBC AUTO-ENTMCNC: 28.8 G/DL (ref 31.5–36.5)
MCHC RBC AUTO-ENTMCNC: 28.8 G/DL (ref 31.5–36.5)
MCHC RBC AUTO-ENTMCNC: 28.9 G/DL (ref 31.5–36.5)
MCHC RBC AUTO-ENTMCNC: 29.1 G/DL (ref 31.5–36.5)
MCHC RBC AUTO-ENTMCNC: 29.2 G/DL (ref 31.5–36.5)
MCHC RBC AUTO-ENTMCNC: 29.4 G/DL (ref 31.5–36.5)
MCHC RBC AUTO-ENTMCNC: 29.5 G/DL (ref 31.5–36.5)
MCHC RBC AUTO-ENTMCNC: 29.6 G/DL (ref 31.5–36.5)
MCHC RBC AUTO-ENTMCNC: 29.7 G/DL (ref 31.5–36.5)
MCHC RBC AUTO-ENTMCNC: 29.8 G/DL (ref 31.5–36.5)
MCHC RBC AUTO-ENTMCNC: 29.9 G/DL (ref 31.5–36.5)
MCHC RBC AUTO-ENTMCNC: 30 G/DL (ref 31.5–36.5)
MCHC RBC AUTO-ENTMCNC: 30.1 G/DL (ref 31.5–36.5)
MCHC RBC AUTO-ENTMCNC: 30.3 G/DL (ref 31.5–36.5)
MCHC RBC AUTO-ENTMCNC: 30.3 G/DL (ref 31.5–36.5)
MCHC RBC AUTO-ENTMCNC: 30.4 G/DL (ref 31.5–36.5)
MCHC RBC AUTO-ENTMCNC: 30.6 G/DL (ref 31.5–36.5)
MCHC RBC AUTO-ENTMCNC: 30.7 G/DL (ref 31.5–36.5)
MCHC RBC AUTO-ENTMCNC: 30.8 G/DL (ref 31.5–36.5)
MCHC RBC AUTO-ENTMCNC: 30.8 G/DL (ref 31.5–36.5)
MCHC RBC AUTO-ENTMCNC: 31.1 G/DL (ref 31.5–36.5)
MCHC RBC AUTO-ENTMCNC: 31.2 G/DL (ref 31.5–36.5)
MCHC RBC AUTO-ENTMCNC: 31.5 G/DL (ref 31.5–36.5)
MCV RBC AUTO: 100 FL (ref 78–100)
MCV RBC AUTO: 101 FL (ref 78–100)
MCV RBC AUTO: 101 FL (ref 78–100)
MCV RBC AUTO: 103 FL (ref 78–100)
MCV RBC AUTO: 104 FL (ref 78–100)
MCV RBC AUTO: 104 FL (ref 78–100)
MCV RBC AUTO: 105 FL (ref 78–100)
MCV RBC AUTO: 106 FL (ref 78–100)
MCV RBC AUTO: 106 FL (ref 78–100)
MCV RBC AUTO: 107 FL (ref 78–100)
MCV RBC AUTO: 108 FL (ref 78–100)
MCV RBC AUTO: 87 FL (ref 78–100)
MCV RBC AUTO: 87 FL (ref 78–100)
MCV RBC AUTO: 88 FL (ref 78–100)
MCV RBC AUTO: 89 FL (ref 78–100)
MCV RBC AUTO: 90 FL (ref 78–100)
MCV RBC AUTO: 91 FL (ref 78–100)
MCV RBC AUTO: 92 FL (ref 78–100)
MCV RBC AUTO: 93 FL (ref 78–100)
MCV RBC AUTO: 94 FL (ref 78–100)
MCV RBC AUTO: 95 FL (ref 78–100)
MCV RBC AUTO: 95 FL (ref 78–100)
MCV RBC AUTO: 96 FL (ref 78–100)
MCV RBC AUTO: 96 FL (ref 78–100)
MCV RBC AUTO: 97 FL (ref 78–100)
MCV RBC AUTO: 99 FL (ref 78–100)
METAMYELOCYTES # BLD: 0.4 10E9/L
METAMYELOCYTES NFR BLD MANUAL: 2.7 %
MONOCYTES # BLD AUTO: 0.6 10E9/L (ref 0–1.3)
MONOCYTES # BLD AUTO: 0.7 10E9/L (ref 0–1.3)
MONOCYTES # BLD AUTO: 0.8 10E9/L (ref 0–1.3)
MONOCYTES # BLD AUTO: 0.9 10E9/L (ref 0–1.3)
MONOCYTES # BLD AUTO: 0.9 10E9/L (ref 0–1.3)
MONOCYTES # BLD AUTO: 1 10E9/L (ref 0–1.3)
MONOCYTES NFR BLD AUTO: 10.7 %
MONOCYTES NFR BLD AUTO: 11 %
MONOCYTES NFR BLD AUTO: 11.1 %
MONOCYTES NFR BLD AUTO: 11.4 %
MONOCYTES NFR BLD AUTO: 11.7 %
MONOCYTES NFR BLD AUTO: 13.1 %
MONOCYTES NFR BLD AUTO: 13.7 %
MONOCYTES NFR BLD AUTO: 5.4 %
MONOCYTES NFR BLD AUTO: 6.2 %
MONOCYTES NFR BLD AUTO: 6.7 %
MONOCYTES NFR BLD AUTO: 7.6 %
MONOCYTES NFR BLD AUTO: 7.9 %
MONOCYTES NFR BLD AUTO: 8.1 %
MONOCYTES NFR BLD AUTO: 8.3 %
MONOCYTES NFR BLD AUTO: 8.8 %
MONOCYTES NFR BLD AUTO: 9.3 %
MONOCYTES NFR BLD AUTO: 9.8 %
MUCOUS THREADS #/AREA URNS LPF: PRESENT /LPF
MYELOCYTES # BLD: 0.9 10E9/L
MYELOCYTES NFR BLD MANUAL: 6.3 %
MYOGLOBIN UR-MCNC: <1 MG/L (ref 0–1)
N GONORRHOEA DNA SPEC QL NAA+PROBE: ABNORMAL
NEUTROPHILS # BLD AUTO: 3.5 10E9/L (ref 1.6–8.3)
NEUTROPHILS # BLD AUTO: 4.1 10E9/L (ref 1.6–8.3)
NEUTROPHILS # BLD AUTO: 4.2 10E9/L (ref 1.6–8.3)
NEUTROPHILS # BLD AUTO: 4.3 10E9/L (ref 1.6–8.3)
NEUTROPHILS # BLD AUTO: 4.6 10E9/L (ref 1.6–8.3)
NEUTROPHILS # BLD AUTO: 4.7 10E9/L (ref 1.6–8.3)
NEUTROPHILS # BLD AUTO: 5.1 10E9/L (ref 1.6–8.3)
NEUTROPHILS # BLD AUTO: 5.1 10E9/L (ref 1.6–8.3)
NEUTROPHILS # BLD AUTO: 5.6 10E9/L (ref 1.6–8.3)
NEUTROPHILS # BLD AUTO: 5.7 10E9/L (ref 1.6–8.3)
NEUTROPHILS # BLD AUTO: 5.7 10E9/L (ref 1.6–8.3)
NEUTROPHILS # BLD AUTO: 5.9 10E9/L (ref 1.6–8.3)
NEUTROPHILS # BLD AUTO: 6.8 10E9/L (ref 1.6–8.3)
NEUTROPHILS # BLD AUTO: 7.3 10E9/L (ref 1.6–8.3)
NEUTROPHILS # BLD AUTO: 7.9 10E9/L (ref 1.6–8.3)
NEUTROPHILS # BLD AUTO: 9.4 10E9/L (ref 1.6–8.3)
NEUTROPHILS # BLD AUTO: 9.7 10E9/L (ref 1.6–8.3)
NEUTROPHILS NFR BLD AUTO: 57.4 %
NEUTROPHILS NFR BLD AUTO: 61.8 %
NEUTROPHILS NFR BLD AUTO: 62.2 %
NEUTROPHILS NFR BLD AUTO: 62.9 %
NEUTROPHILS NFR BLD AUTO: 63.7 %
NEUTROPHILS NFR BLD AUTO: 64.2 %
NEUTROPHILS NFR BLD AUTO: 64.2 %
NEUTROPHILS NFR BLD AUTO: 65 %
NEUTROPHILS NFR BLD AUTO: 67.8 %
NEUTROPHILS NFR BLD AUTO: 67.8 %
NEUTROPHILS NFR BLD AUTO: 68.1 %
NEUTROPHILS NFR BLD AUTO: 68.5 %
NEUTROPHILS NFR BLD AUTO: 69.3 %
NEUTROPHILS NFR BLD AUTO: 69.4 %
NEUTROPHILS NFR BLD AUTO: 69.4 %
NEUTROPHILS NFR BLD AUTO: 78.1 %
NEUTROPHILS NFR BLD AUTO: 84.4 %
NITRATE UR QL: NEGATIVE
NONHDLC SERPL-MCNC: 114 MG/DL
NONHDLC SERPL-MCNC: 116 MG/DL
NONHDLC SERPL-MCNC: 93 MG/DL
NRBC # BLD AUTO: 0 10*3/UL
NRBC # BLD AUTO: 0.1 10*3/UL
NRBC # BLD AUTO: 0.2 10*3/UL
NRBC # BLD AUTO: 0.3 10*3/UL
NRBC BLD AUTO-RTO: 0 /100
NRBC BLD AUTO-RTO: 1 /100
NRBC BLD AUTO-RTO: 2 /100
NRBC BLD AUTO-RTO: 2 /100
NT-PROBNP SERPL-MCNC: 6634 PG/ML (ref 0–900)
NT-PROBNP SERPL-MCNC: ABNORMAL PG/ML (ref 0–900)
O2/TOTAL GAS SETTING VFR VENT: 40 %
O2/TOTAL GAS SETTING VFR VENT: 90 %
PCO2 BLD: 19 MM HG (ref 35–45)
PCO2 BLDV: 27 MM HG (ref 40–50)
PH BLD: 7.25 PH (ref 7.35–7.45)
PH BLDV: 7.02 PH (ref 7.32–7.43)
PH UR STRIP: 5.5 PH (ref 5–7)
PH UR STRIP: 6 PH (ref 5–7)
PH UR STRIP: 6.5 PH (ref 5–7)
PHOSPHATE SERPL-MCNC: 1.6 MG/DL (ref 2.5–4.5)
PHOSPHATE SERPL-MCNC: 1.8 MG/DL (ref 2.5–4.5)
PHOSPHATE SERPL-MCNC: 2.3 MG/DL (ref 2.5–4.5)
PHOSPHATE SERPL-MCNC: 2.3 MG/DL (ref 2.5–4.5)
PHOSPHATE SERPL-MCNC: 2.6 MG/DL (ref 2.5–4.5)
PHOSPHATE SERPL-MCNC: 3 MG/DL (ref 2.5–4.5)
PHOSPHATE SERPL-MCNC: 3 MG/DL (ref 2.5–4.5)
PHOSPHATE SERPL-MCNC: 3.1 MG/DL (ref 2.5–4.5)
PHOSPHATE SERPL-MCNC: 8.5 MG/DL (ref 2.5–4.5)
PLATELET # BLD AUTO: 154 10E9/L (ref 150–450)
PLATELET # BLD AUTO: 162 10E9/L (ref 150–450)
PLATELET # BLD AUTO: 171 10E9/L (ref 150–450)
PLATELET # BLD AUTO: 173 10E9/L (ref 150–450)
PLATELET # BLD AUTO: 175 10E9/L (ref 150–450)
PLATELET # BLD AUTO: 177 10E9/L (ref 150–450)
PLATELET # BLD AUTO: 178 10E9/L (ref 150–450)
PLATELET # BLD AUTO: 181 10E9/L (ref 150–450)
PLATELET # BLD AUTO: 181 10E9/L (ref 150–450)
PLATELET # BLD AUTO: 187 10E9/L (ref 150–450)
PLATELET # BLD AUTO: 188 10E9/L (ref 150–450)
PLATELET # BLD AUTO: 188 10E9/L (ref 150–450)
PLATELET # BLD AUTO: 189 10E9/L (ref 150–450)
PLATELET # BLD AUTO: 191 10E9/L (ref 150–450)
PLATELET # BLD AUTO: 193 10E9/L (ref 150–450)
PLATELET # BLD AUTO: 196 10E9/L (ref 150–450)
PLATELET # BLD AUTO: 199 10E9/L (ref 150–450)
PLATELET # BLD AUTO: 200 10E9/L (ref 150–450)
PLATELET # BLD AUTO: 201 10E9/L (ref 150–450)
PLATELET # BLD AUTO: 207 10E9/L (ref 150–450)
PLATELET # BLD AUTO: 209 10E9/L (ref 150–450)
PLATELET # BLD AUTO: 209 10E9/L (ref 150–450)
PLATELET # BLD AUTO: 210 10E9/L (ref 150–450)
PLATELET # BLD AUTO: 211 10E9/L (ref 150–450)
PLATELET # BLD AUTO: 212 10E9/L (ref 150–450)
PLATELET # BLD AUTO: 215 10E9/L (ref 150–450)
PLATELET # BLD AUTO: 219 10E9/L (ref 150–450)
PLATELET # BLD AUTO: 220 10E9/L (ref 150–450)
PLATELET # BLD AUTO: 221 10E9/L (ref 150–450)
PLATELET # BLD AUTO: 222 10E9/L (ref 150–450)
PLATELET # BLD AUTO: 226 10E9/L (ref 150–450)
PLATELET # BLD AUTO: 227 10E9/L (ref 150–450)
PLATELET # BLD AUTO: 227 10E9/L (ref 150–450)
PLATELET # BLD AUTO: 228 10E9/L (ref 150–450)
PLATELET # BLD AUTO: 228 10E9/L (ref 150–450)
PLATELET # BLD AUTO: 230 10E9/L (ref 150–450)
PLATELET # BLD AUTO: 231 10E9/L (ref 150–450)
PLATELET # BLD AUTO: 232 10E9/L (ref 150–450)
PLATELET # BLD AUTO: 237 10E9/L (ref 150–450)
PLATELET # BLD AUTO: 251 10E9/L (ref 150–450)
PLATELET # BLD AUTO: 261 10E9/L (ref 150–450)
PLATELET # BLD AUTO: 320 10E9/L (ref 150–450)
PLATELET # BLD AUTO: 331 10E9/L (ref 150–450)
PLATELET # BLD AUTO: 346 10E9/L (ref 150–450)
PLATELET # BLD EST: ABNORMAL 10*3/UL
PO2 BLD: 447 MM HG (ref 80–105)
PO2 BLDV: 32 MM HG (ref 25–47)
POIKILOCYTOSIS BLD QL SMEAR: ABNORMAL
POLYCHROMASIA BLD QL SMEAR: SLIGHT
POTASSIUM SERPL-SCNC: 3.6 MMOL/L (ref 3.4–5.3)
POTASSIUM SERPL-SCNC: 3.8 MMOL/L (ref 3.4–5.3)
POTASSIUM SERPL-SCNC: 3.8 MMOL/L (ref 3.5–5)
POTASSIUM SERPL-SCNC: 3.9 MMOL/L (ref 3.4–5.3)
POTASSIUM SERPL-SCNC: 4 MMOL/L (ref 3.4–5.3)
POTASSIUM SERPL-SCNC: 4.1 MMOL/L (ref 3.4–5.3)
POTASSIUM SERPL-SCNC: 4.2 MMOL/L (ref 3.4–5.3)
POTASSIUM SERPL-SCNC: 4.3 MMOL/L (ref 3.4–5.3)
POTASSIUM SERPL-SCNC: 4.4 MMOL/L (ref 3.4–5.3)
POTASSIUM SERPL-SCNC: 4.4 MMOL/L (ref 3.4–5.3)
POTASSIUM SERPL-SCNC: 4.5 MMOL/L (ref 3.4–5.3)
POTASSIUM SERPL-SCNC: 4.7 MMOL/L (ref 3.4–5.3)
POTASSIUM SERPL-SCNC: 4.8 MMOL/L (ref 3.4–5.3)
POTASSIUM SERPL-SCNC: 4.8 MMOL/L (ref 3.4–5.3)
POTASSIUM SERPL-SCNC: 4.9 MMOL/L (ref 3.4–5.3)
POTASSIUM SERPL-SCNC: 5.1 MMOL/L (ref 3.4–5.3)
POTASSIUM SERPL-SCNC: 5.2 MMOL/L (ref 3.4–5.3)
POTASSIUM SERPL-SCNC: 5.4 MMOL/L (ref 3.4–5.3)
POTASSIUM SERPL-SCNC: 5.7 MMOL/L (ref 3.4–5.3)
PROCALCITONIN SERPL-MCNC: 0.12 NG/ML
PROCALCITONIN SERPL-MCNC: 0.25 NG/ML
PROCALCITONIN SERPL-MCNC: <0.05 NG/ML
PROT SERPL-MCNC: 6.9 G/DL (ref 6.8–8.8)
PROT SERPL-MCNC: 6.9 G/DL (ref 6.8–8.8)
PROT SERPL-MCNC: 7 G/DL (ref 6.8–8.8)
PROT SERPL-MCNC: 7 G/DL (ref 6.8–8.8)
PROT SERPL-MCNC: 7.1 G/DL (ref 6.8–8.8)
PROT SERPL-MCNC: 7.2 G/DL (ref 6.8–8.8)
PROT SERPL-MCNC: 7.3 G/DL (ref 6.8–8.8)
PROT SERPL-MCNC: 7.3 G/DL (ref 6.8–8.8)
PROT SERPL-MCNC: 7.4 G/DL (ref 6.8–8.8)
PROT SERPL-MCNC: 7.4 G/DL (ref 6.8–8.8)
PROT SERPL-MCNC: 7.5 G/DL (ref 6.8–8.8)
PROT SERPL-MCNC: 7.6 G/DL (ref 6.8–8.8)
PROT SERPL-MCNC: 7.7 G/DL (ref 6.8–8.8)
PROT SERPL-MCNC: 7.8 G/DL (ref 6.8–8.8)
PROT SERPL-MCNC: 7.9 G/DL (ref 6.8–8.8)
PROT UR-MCNC: 0.29 G/L
PROT/CREAT 24H UR: 0.79 G/G CR (ref 0–0.2)
RADIOLOGIST FLAGS: ABNORMAL
RADIOLOGIST FLAGS: ABNORMAL
RBC # BLD AUTO: 2.3 10E12/L (ref 4.4–5.9)
RBC # BLD AUTO: 2.51 10E12/L (ref 4.4–5.9)
RBC # BLD AUTO: 2.53 10E12/L (ref 4.4–5.9)
RBC # BLD AUTO: 2.54 10E12/L (ref 4.4–5.9)
RBC # BLD AUTO: 2.55 10E12/L (ref 4.4–5.9)
RBC # BLD AUTO: 2.56 10E12/L (ref 4.4–5.9)
RBC # BLD AUTO: 2.57 10E12/L (ref 4.4–5.9)
RBC # BLD AUTO: 2.63 10E12/L (ref 4.4–5.9)
RBC # BLD AUTO: 2.65 10E12/L (ref 4.4–5.9)
RBC # BLD AUTO: 2.68 10E12/L (ref 4.4–5.9)
RBC # BLD AUTO: 2.69 10E12/L (ref 4.4–5.9)
RBC # BLD AUTO: 2.74 10E12/L (ref 4.4–5.9)
RBC # BLD AUTO: 2.74 10E12/L (ref 4.4–5.9)
RBC # BLD AUTO: 2.75 10E12/L (ref 4.4–5.9)
RBC # BLD AUTO: 2.8 10E12/L (ref 4.4–5.9)
RBC # BLD AUTO: 2.81 10E12/L (ref 4.4–5.9)
RBC # BLD AUTO: 2.88 10E12/L (ref 4.4–5.9)
RBC # BLD AUTO: 2.9 10E12/L (ref 4.4–5.9)
RBC # BLD AUTO: 2.9 10E12/L (ref 4.4–5.9)
RBC # BLD AUTO: 2.91 10E12/L (ref 4.4–5.9)
RBC # BLD AUTO: 2.96 10E12/L (ref 4.4–5.9)
RBC # BLD AUTO: 2.99 10E12/L (ref 4.4–5.9)
RBC # BLD AUTO: 3.01 10E12/L (ref 4.4–5.9)
RBC # BLD AUTO: 3.03 10E12/L (ref 4.4–5.9)
RBC # BLD AUTO: 3.05 10E12/L (ref 4.4–5.9)
RBC # BLD AUTO: 3.21 10E12/L (ref 4.4–5.9)
RBC # BLD AUTO: 3.28 10E12/L (ref 4.4–5.9)
RBC # BLD AUTO: 3.34 10E12/L (ref 4.4–5.9)
RBC # BLD AUTO: 3.37 10E12/L (ref 4.4–5.9)
RBC # BLD AUTO: 3.37 10E12/L (ref 4.4–5.9)
RBC # BLD AUTO: 3.43 10E12/L (ref 4.4–5.9)
RBC # BLD AUTO: 3.5 10E12/L (ref 4.4–5.9)
RBC # BLD AUTO: 3.57 10E12/L (ref 4.4–5.9)
RBC # BLD AUTO: 3.58 10E12/L (ref 4.4–5.9)
RBC # BLD AUTO: 3.69 10E12/L (ref 4.4–5.9)
RBC # BLD AUTO: 3.77 10E12/L (ref 4.4–5.9)
RBC # BLD AUTO: 3.86 10E12/L (ref 4.4–5.9)
RBC # BLD AUTO: 3.89 10E12/L (ref 4.4–5.9)
RBC # BLD AUTO: 3.96 10E12/L (ref 4.4–5.9)
RBC # BLD AUTO: 4 10E12/L (ref 4.4–5.9)
RBC # BLD AUTO: 4.12 10E12/L (ref 4.4–5.9)
RBC # BLD AUTO: 4.18 10E12/L (ref 4.4–5.9)
RBC #/AREA URNS AUTO: 1 /HPF (ref 0–2)
RBC #/AREA URNS AUTO: 3 /HPF (ref 0–2)
RBC #/AREA URNS AUTO: 46 /HPF (ref 0–2)
RBC #/AREA URNS AUTO: 8 /HPF (ref 0–2)
RBC #/AREA URNS AUTO: <1 /HPF (ref 0–2)
RBC #/AREA URNS AUTO: >182 /HPF (ref 0–2)
RBC INCLUSIONS BLD: ABNORMAL
RENAL EPI CELLS #/AREA URNS HPF: <1 /HPF
RETICS # AUTO: 211.5 10E9/L (ref 25–95)
RETICS/RBC NFR AUTO: 8.4 % (ref 0.5–2)
SODIUM SERPL-SCNC: 129 MMOL/L (ref 133–144)
SODIUM SERPL-SCNC: 129 MMOL/L (ref 133–144)
SODIUM SERPL-SCNC: 131 MMOL/L (ref 133–144)
SODIUM SERPL-SCNC: 132 MMOL/L (ref 133–144)
SODIUM SERPL-SCNC: 133 MMOL/L (ref 133–144)
SODIUM SERPL-SCNC: 134 MMOL/L (ref 133–144)
SODIUM SERPL-SCNC: 135 MMOL/L (ref 133–144)
SODIUM SERPL-SCNC: 136 MMOL/L (ref 133–144)
SODIUM SERPL-SCNC: 137 MMOL/L (ref 133–144)
SODIUM SERPL-SCNC: 137 MMOL/L (ref 135–145)
SODIUM SERPL-SCNC: 138 MMOL/L (ref 133–144)
SODIUM SERPL-SCNC: 139 MMOL/L (ref 133–144)
SODIUM SERPL-SCNC: 140 MMOL/L (ref 133–144)
SODIUM SERPL-SCNC: 140 MMOL/L (ref 133–144)
SODIUM SERPL-SCNC: 141 MMOL/L (ref 133–144)
SODIUM SERPL-SCNC: 142 MMOL/L (ref 133–144)
SODIUM SERPL-SCNC: 144 MMOL/L (ref 133–144)
SODIUM SERPL-SCNC: 145 MMOL/L (ref 133–144)
SODIUM SERPL-SCNC: 147 MMOL/L (ref 133–144)
SODIUM SERPL-SCNC: 147 MMOL/L (ref 133–144)
SODIUM SERPL-SCNC: 148 MMOL/L (ref 133–144)
SODIUM SERPL-SCNC: 150 MMOL/L (ref 133–144)
SODIUM SERPL-SCNC: 150 MMOL/L (ref 133–144)
SODIUM SERPL-SCNC: 151 MMOL/L (ref 133–144)
SODIUM SERPL-SCNC: 151 MMOL/L (ref 133–144)
SODIUM SERPL-SCNC: 152 MMOL/L (ref 133–144)
SODIUM SERPL-SCNC: 154 MMOL/L (ref 133–144)
SODIUM SERPL-SCNC: 157 MMOL/L (ref 133–144)
SOURCE: ABNORMAL
SP GR UR STRIP: 1 (ref 1–1.03)
SP GR UR STRIP: 1.01 (ref 1–1.03)
SP GR UR STRIP: 1.02 (ref 1–1.03)
SP GR UR STRIP: 1.02 (ref 1–1.03)
SPECIMEN EXP DATE BLD: NORMAL
SPECIMEN SOURCE: ABNORMAL
SPECIMEN SOURCE: NORMAL
SQUAMOUS #/AREA URNS AUTO: 1 /HPF (ref 0–1)
SQUAMOUS #/AREA URNS AUTO: <1 /HPF (ref 0–1)
TIBC SERPL-MCNC: 215 UG/DL (ref 240–430)
TIBC SERPL-MCNC: 270 UG/DL (ref 240–430)
TRANS CELLS #/AREA URNS HPF: 1 /HPF (ref 0–1)
TRANS CELLS #/AREA URNS HPF: <1 /HPF (ref 0–1)
TRANSFERRIN SERPL-MCNC: 174 MG/DL (ref 210–360)
TRANSFERRIN SERPL-MCNC: 217 MG/DL (ref 210–360)
TRIGL SERPL-MCNC: 113 MG/DL
TRIGL SERPL-MCNC: 131 MG/DL
TRIGL SERPL-MCNC: 131 MG/DL
TROPONIN I BLD-MCNC: 0.01 UG/L (ref 0–0.1)
TROPONIN I BLD-MCNC: 0.04 UG/L (ref 0–0.1)
TROPONIN I SERPL-MCNC: 0.47 UG/L (ref 0–0.04)
TROPONIN I SERPL-MCNC: <0.015 UG/L (ref 0–0.04)
URATE SERPL-MCNC: 6.1 MG/DL (ref 3.5–7.2)
UROBILINOGEN UR STRIP-MCNC: 2 MG/DL (ref 0–2)
UROBILINOGEN UR STRIP-MCNC: 2 MG/DL (ref 0–2)
UROBILINOGEN UR STRIP-MCNC: 4 MG/DL (ref 0–2)
UROBILINOGEN UR STRIP-MCNC: NORMAL MG/DL (ref 0–2)
WBC # BLD AUTO: 10 10E9/L (ref 4–11)
WBC # BLD AUTO: 10.2 10E9/L (ref 4–11)
WBC # BLD AUTO: 10.5 10E9/L (ref 4–11)
WBC # BLD AUTO: 10.8 10E9/L (ref 4–11)
WBC # BLD AUTO: 11.4 10E9/L (ref 4–11)
WBC # BLD AUTO: 12.1 10E9/L (ref 4–11)
WBC # BLD AUTO: 12.7 10E9/L (ref 4–11)
WBC # BLD AUTO: 13.2 10E9/L (ref 4–11)
WBC # BLD AUTO: 13.3 10E9/L (ref 4–11)
WBC # BLD AUTO: 14.2 10E9/L (ref 4–11)
WBC # BLD AUTO: 14.4 10E9/L (ref 4–11)
WBC # BLD AUTO: 14.8 10E9/L (ref 4–11)
WBC # BLD AUTO: 14.9 10E9/L (ref 4–11)
WBC # BLD AUTO: 15.3 10E9/L (ref 4–11)
WBC # BLD AUTO: 15.4 10E9/L (ref 4–11)
WBC # BLD AUTO: 16.1 10E9/L (ref 4–11)
WBC # BLD AUTO: 17.4 10E9/L (ref 4–11)
WBC # BLD AUTO: 18.9 10E9/L (ref 4–11)
WBC # BLD AUTO: 24.1 10E9/L (ref 4–11)
WBC # BLD AUTO: 33.6 10E9/L (ref 4–11)
WBC # BLD AUTO: 5.2 10E9/L (ref 4–11)
WBC # BLD AUTO: 5.8 10E9/L (ref 4–11)
WBC # BLD AUTO: 6.1 10E9/L (ref 4–11)
WBC # BLD AUTO: 6.3 10E9/L (ref 4–11)
WBC # BLD AUTO: 6.4 10E9/L (ref 4–11)
WBC # BLD AUTO: 6.4 10E9/L (ref 4–11)
WBC # BLD AUTO: 6.5 10E9/L (ref 4–11)
WBC # BLD AUTO: 6.6 10E9/L (ref 4–11)
WBC # BLD AUTO: 6.6 10E9/L (ref 4–11)
WBC # BLD AUTO: 6.7 10E9/L (ref 4–11)
WBC # BLD AUTO: 6.8 10E9/L (ref 4–11)
WBC # BLD AUTO: 7.1 10E9/L (ref 4–11)
WBC # BLD AUTO: 7.2 10E9/L (ref 4–11)
WBC # BLD AUTO: 7.4 10E9/L (ref 4–11)
WBC # BLD AUTO: 7.4 10E9/L (ref 4–11)
WBC # BLD AUTO: 7.6 10E9/L (ref 4–11)
WBC # BLD AUTO: 7.8 10E9/L (ref 4–11)
WBC # BLD AUTO: 8 10E9/L (ref 4–11)
WBC # BLD AUTO: 8.1 10E9/L (ref 4–11)
WBC # BLD AUTO: 8.4 10E9/L (ref 4–11)
WBC # BLD AUTO: 8.4 10E9/L (ref 4–11)
WBC # BLD AUTO: 8.7 10E9/L (ref 4–11)
WBC # BLD AUTO: 8.8 10E9/L (ref 4–11)
WBC # BLD AUTO: 9.2 10E9/L (ref 4–11)
WBC #/AREA URNS AUTO: 0 /HPF (ref 0–5)
WBC #/AREA URNS AUTO: 1 /HPF (ref 0–5)
WBC #/AREA URNS AUTO: 1 /HPF (ref 0–5)
WBC #/AREA URNS AUTO: 2 /HPF (ref 0–5)
WBC #/AREA URNS AUTO: 3 /HPF (ref 0–5)
WBC #/AREA URNS AUTO: 4 /HPF (ref 0–5)
WBC #/AREA URNS AUTO: 7 /HPF (ref 0–5)
WBC #/AREA URNS AUTO: 76 /HPF (ref 0–5)
WBC #/AREA URNS AUTO: >182 /HPF (ref 0–5)
WBC CLUMPS #/AREA URNS HPF: PRESENT /HPF
WBC CLUMPS #/AREA URNS HPF: PRESENT /HPF

## 2018-01-01 PROCEDURE — 85610 PROTHROMBIN TIME: CPT

## 2018-01-01 PROCEDURE — 85027 COMPLETE CBC AUTOMATED: CPT | Performed by: INTERNAL MEDICINE

## 2018-01-01 PROCEDURE — 25000132 ZZH RX MED GY IP 250 OP 250 PS 637: Performed by: STUDENT IN AN ORGANIZED HEALTH CARE EDUCATION/TRAINING PROGRAM

## 2018-01-01 PROCEDURE — 83735 ASSAY OF MAGNESIUM: CPT | Performed by: STUDENT IN AN ORGANIZED HEALTH CARE EDUCATION/TRAINING PROGRAM

## 2018-01-01 PROCEDURE — 12000008 ZZH R&B INTERMEDIATE UMMC

## 2018-01-01 PROCEDURE — 36415 COLL VENOUS BLD VENIPUNCTURE: CPT | Performed by: STUDENT IN AN ORGANIZED HEALTH CARE EDUCATION/TRAINING PROGRAM

## 2018-01-01 PROCEDURE — 25000132 ZZH RX MED GY IP 250 OP 250 PS 637: Performed by: NURSE PRACTITIONER

## 2018-01-01 PROCEDURE — 71046 X-RAY EXAM CHEST 2 VIEWS: CPT

## 2018-01-01 PROCEDURE — 87088 URINE BACTERIA CULTURE: CPT | Performed by: EMERGENCY MEDICINE

## 2018-01-01 PROCEDURE — 51798 US URINE CAPACITY MEASURE: CPT | Performed by: EMERGENCY MEDICINE

## 2018-01-01 PROCEDURE — 36415 COLL VENOUS BLD VENIPUNCTURE: CPT

## 2018-01-01 PROCEDURE — 99347 HOME/RES VST EST SF MDM 20: CPT | Performed by: FAMILY MEDICINE

## 2018-01-01 PROCEDURE — 97165 OT EVAL LOW COMPLEX 30 MIN: CPT | Mod: GO

## 2018-01-01 PROCEDURE — 93010 ELECTROCARDIOGRAM REPORT: CPT | Performed by: INTERNAL MEDICINE

## 2018-01-01 PROCEDURE — 92526 ORAL FUNCTION THERAPY: CPT | Mod: GN | Performed by: SPEECH-LANGUAGE PATHOLOGIST

## 2018-01-01 PROCEDURE — 82247 BILIRUBIN TOTAL: CPT

## 2018-01-01 PROCEDURE — 80048 BASIC METABOLIC PNL TOTAL CA: CPT | Performed by: PSYCHIATRY & NEUROLOGY

## 2018-01-01 PROCEDURE — 85025 COMPLETE CBC W/AUTO DIFF WBC: CPT | Performed by: EMERGENCY MEDICINE

## 2018-01-01 PROCEDURE — 25000125 ZZHC RX 250: Performed by: CLINICAL NURSE SPECIALIST

## 2018-01-01 PROCEDURE — 25000132 ZZH RX MED GY IP 250 OP 250 PS 637: Performed by: PSYCHIATRY & NEUROLOGY

## 2018-01-01 PROCEDURE — 36415 COLL VENOUS BLD VENIPUNCTURE: CPT | Performed by: PSYCHIATRY & NEUROLOGY

## 2018-01-01 PROCEDURE — 25000128 H RX IP 250 OP 636: Performed by: STUDENT IN AN ORGANIZED HEALTH CARE EDUCATION/TRAINING PROGRAM

## 2018-01-01 PROCEDURE — 83540 ASSAY OF IRON: CPT | Performed by: STUDENT IN AN ORGANIZED HEALTH CARE EDUCATION/TRAINING PROGRAM

## 2018-01-01 PROCEDURE — 85520 HEPARIN ASSAY: CPT

## 2018-01-01 PROCEDURE — 99232 SBSQ HOSP IP/OBS MODERATE 35: CPT | Mod: GC | Performed by: INTERNAL MEDICINE

## 2018-01-01 PROCEDURE — 85610 PROTHROMBIN TIME: CPT | Performed by: STUDENT IN AN ORGANIZED HEALTH CARE EDUCATION/TRAINING PROGRAM

## 2018-01-01 PROCEDURE — 85027 COMPLETE CBC AUTOMATED: CPT | Performed by: MARRIAGE & FAMILY THERAPIST

## 2018-01-01 PROCEDURE — 27210429 ZZH NUTRITION PRODUCT INTERMEDIATE LITER

## 2018-01-01 PROCEDURE — 82310 ASSAY OF CALCIUM: CPT

## 2018-01-01 PROCEDURE — 76705 ECHO EXAM OF ABDOMEN: CPT

## 2018-01-01 PROCEDURE — 82374 ASSAY BLOOD CARBON DIOXIDE: CPT

## 2018-01-01 PROCEDURE — 80053 COMPREHEN METABOLIC PANEL: CPT | Performed by: NURSE PRACTITIONER

## 2018-01-01 PROCEDURE — 36415 COLL VENOUS BLD VENIPUNCTURE: CPT | Performed by: EMERGENCY MEDICINE

## 2018-01-01 PROCEDURE — 25000132 ZZH RX MED GY IP 250 OP 250 PS 637: Performed by: INTERNAL MEDICINE

## 2018-01-01 PROCEDURE — 84295 ASSAY OF SERUM SODIUM: CPT | Performed by: STUDENT IN AN ORGANIZED HEALTH CARE EDUCATION/TRAINING PROGRAM

## 2018-01-01 PROCEDURE — 87086 URINE CULTURE/COLONY COUNT: CPT | Performed by: EMERGENCY MEDICINE

## 2018-01-01 PROCEDURE — 85018 HEMOGLOBIN: CPT | Performed by: INTERNAL MEDICINE

## 2018-01-01 PROCEDURE — 36415 COLL VENOUS BLD VENIPUNCTURE: CPT | Performed by: NURSE PRACTITIONER

## 2018-01-01 PROCEDURE — 99223 1ST HOSP IP/OBS HIGH 75: CPT | Performed by: INTERNAL MEDICINE

## 2018-01-01 PROCEDURE — 25000128 H RX IP 250 OP 636: Performed by: EMERGENCY MEDICINE

## 2018-01-01 PROCEDURE — 93010 ELECTROCARDIOGRAM REPORT: CPT | Mod: 77 | Performed by: EMERGENCY MEDICINE

## 2018-01-01 PROCEDURE — 80048 BASIC METABOLIC PNL TOTAL CA: CPT

## 2018-01-01 PROCEDURE — 99285 EMERGENCY DEPT VISIT HI MDM: CPT | Mod: 25 | Performed by: EMERGENCY MEDICINE

## 2018-01-01 PROCEDURE — 99238 HOSP IP/OBS DSCHRG MGMT 30/<: CPT | Mod: GC | Performed by: INTERNAL MEDICINE

## 2018-01-01 PROCEDURE — 84460 ALANINE AMINO (ALT) (SGPT): CPT

## 2018-01-01 PROCEDURE — 84520 ASSAY OF UREA NITROGEN: CPT

## 2018-01-01 PROCEDURE — 70450 CT HEAD/BRAIN W/O DYE: CPT

## 2018-01-01 PROCEDURE — 99350 HOME/RES VST EST HIGH MDM 60: CPT | Performed by: FAMILY MEDICINE

## 2018-01-01 PROCEDURE — 83690 ASSAY OF LIPASE: CPT | Performed by: EMERGENCY MEDICINE

## 2018-01-01 PROCEDURE — 80053 COMPREHEN METABOLIC PANEL: CPT | Performed by: MARRIAGE & FAMILY THERAPIST

## 2018-01-01 PROCEDURE — 25000131 ZZH RX MED GY IP 250 OP 636 PS 637: Performed by: STUDENT IN AN ORGANIZED HEALTH CARE EDUCATION/TRAINING PROGRAM

## 2018-01-01 PROCEDURE — 82565 ASSAY OF CREATININE: CPT

## 2018-01-01 PROCEDURE — 85610 PROTHROMBIN TIME: CPT | Mod: QW

## 2018-01-01 PROCEDURE — 87040 BLOOD CULTURE FOR BACTERIA: CPT | Performed by: EMERGENCY MEDICINE

## 2018-01-01 PROCEDURE — 83550 IRON BINDING TEST: CPT | Performed by: STUDENT IN AN ORGANIZED HEALTH CARE EDUCATION/TRAINING PROGRAM

## 2018-01-01 PROCEDURE — 96365 THER/PROPH/DIAG IV INF INIT: CPT | Performed by: EMERGENCY MEDICINE

## 2018-01-01 PROCEDURE — 83605 ASSAY OF LACTIC ACID: CPT | Performed by: PSYCHIATRY & NEUROLOGY

## 2018-01-01 PROCEDURE — 85520 HEPARIN ASSAY: CPT | Performed by: STUDENT IN AN ORGANIZED HEALTH CARE EDUCATION/TRAINING PROGRAM

## 2018-01-01 PROCEDURE — 21400006 ZZH R&B CCU INTERMEDIATE UMMC

## 2018-01-01 PROCEDURE — 83036 HEMOGLOBIN GLYCOSYLATED A1C: CPT | Performed by: STUDENT IN AN ORGANIZED HEALTH CARE EDUCATION/TRAINING PROGRAM

## 2018-01-01 PROCEDURE — 27210432 ZZH NUTRITION PRODUCT RENAL BASIC LITER

## 2018-01-01 PROCEDURE — 82803 BLOOD GASES ANY COMBINATION: CPT | Performed by: STUDENT IN AN ORGANIZED HEALTH CARE EDUCATION/TRAINING PROGRAM

## 2018-01-01 PROCEDURE — 00000146 ZZHCL STATISTIC GLUCOSE BY METER IP

## 2018-01-01 PROCEDURE — 80048 BASIC METABOLIC PNL TOTAL CA: CPT | Performed by: STUDENT IN AN ORGANIZED HEALTH CARE EDUCATION/TRAINING PROGRAM

## 2018-01-01 PROCEDURE — 40000225 ZZH STATISTIC SLP WARD VISIT: Performed by: SPEECH-LANGUAGE PATHOLOGIST

## 2018-01-01 PROCEDURE — 84100 ASSAY OF PHOSPHORUS: CPT | Performed by: STUDENT IN AN ORGANIZED HEALTH CARE EDUCATION/TRAINING PROGRAM

## 2018-01-01 PROCEDURE — 40000986 XR ABDOMEN PORT 1 VW

## 2018-01-01 PROCEDURE — 83735 ASSAY OF MAGNESIUM: CPT | Performed by: INTERNAL MEDICINE

## 2018-01-01 PROCEDURE — 82947 ASSAY GLUCOSE BLOOD QUANT: CPT

## 2018-01-01 PROCEDURE — 99233 SBSQ HOSP IP/OBS HIGH 50: CPT | Performed by: NURSE PRACTITIONER

## 2018-01-01 PROCEDURE — 86850 RBC ANTIBODY SCREEN: CPT | Performed by: PSYCHIATRY & NEUROLOGY

## 2018-01-01 PROCEDURE — 85025 COMPLETE CBC W/AUTO DIFF WBC: CPT | Performed by: INTERNAL MEDICINE

## 2018-01-01 PROCEDURE — 25500064 ZZH RX 255 OP 636: Performed by: INTERNAL MEDICINE

## 2018-01-01 PROCEDURE — 85027 COMPLETE CBC AUTOMATED: CPT

## 2018-01-01 PROCEDURE — 85610 PROTHROMBIN TIME: CPT | Performed by: EMERGENCY MEDICINE

## 2018-01-01 PROCEDURE — 85610 PROTHROMBIN TIME: CPT | Performed by: MARRIAGE & FAMILY THERAPIST

## 2018-01-01 PROCEDURE — 84155 ASSAY OF PROTEIN SERUM: CPT

## 2018-01-01 PROCEDURE — 36415 COLL VENOUS BLD VENIPUNCTURE: CPT | Performed by: INTERNAL MEDICINE

## 2018-01-01 PROCEDURE — 81001 URINALYSIS AUTO W/SCOPE: CPT | Performed by: EMERGENCY MEDICINE

## 2018-01-01 PROCEDURE — 85025 COMPLETE CBC W/AUTO DIFF WBC: CPT | Performed by: MARRIAGE & FAMILY THERAPIST

## 2018-01-01 PROCEDURE — 25000131 ZZH RX MED GY IP 250 OP 636 PS 637: Performed by: PHYSICIAN ASSISTANT

## 2018-01-01 PROCEDURE — 40000141 ZZH STATISTIC PERIPHERAL IV START W/O US GUIDANCE

## 2018-01-01 PROCEDURE — 25000132 ZZH RX MED GY IP 250 OP 250 PS 637

## 2018-01-01 PROCEDURE — 83880 ASSAY OF NATRIURETIC PEPTIDE: CPT | Performed by: EMERGENCY MEDICINE

## 2018-01-01 PROCEDURE — 99291 CRITICAL CARE FIRST HOUR: CPT | Mod: 25 | Performed by: EMERGENCY MEDICINE

## 2018-01-01 PROCEDURE — 82550 ASSAY OF CK (CPK): CPT | Performed by: EMERGENCY MEDICINE

## 2018-01-01 PROCEDURE — 83735 ASSAY OF MAGNESIUM: CPT

## 2018-01-01 PROCEDURE — 93750 INTERROGATION VAD IN PERSON: CPT

## 2018-01-01 PROCEDURE — 84466 ASSAY OF TRANSFERRIN: CPT | Performed by: STUDENT IN AN ORGANIZED HEALTH CARE EDUCATION/TRAINING PROGRAM

## 2018-01-01 PROCEDURE — 83051 HEMOGLOBIN PLASMA: CPT | Performed by: INTERNAL MEDICINE

## 2018-01-01 PROCEDURE — 87800 DETECT AGNT MULT DNA DIREC: CPT | Performed by: STUDENT IN AN ORGANIZED HEALTH CARE EDUCATION/TRAINING PROGRAM

## 2018-01-01 PROCEDURE — 99223 1ST HOSP IP/OBS HIGH 75: CPT | Mod: 25 | Performed by: INTERNAL MEDICINE

## 2018-01-01 PROCEDURE — 25000128 H RX IP 250 OP 636: Performed by: INTERNAL MEDICINE

## 2018-01-01 PROCEDURE — G0378 HOSPITAL OBSERVATION PER HR: HCPCS

## 2018-01-01 PROCEDURE — 83615 LACTATE (LD) (LDH) ENZYME: CPT | Performed by: INTERNAL MEDICINE

## 2018-01-01 PROCEDURE — 84295 ASSAY OF SERUM SODIUM: CPT

## 2018-01-01 PROCEDURE — 80048 BASIC METABOLIC PNL TOTAL CA: CPT | Performed by: MARRIAGE & FAMILY THERAPIST

## 2018-01-01 PROCEDURE — 93750 INTERROGATION VAD IN PERSON: CPT | Performed by: NURSE PRACTITIONER

## 2018-01-01 PROCEDURE — 92610 EVALUATE SWALLOWING FUNCTION: CPT | Mod: GN

## 2018-01-01 PROCEDURE — 74019 RADEX ABDOMEN 2 VIEWS: CPT

## 2018-01-01 PROCEDURE — 25000125 ZZHC RX 250: Performed by: MARRIAGE & FAMILY THERAPIST

## 2018-01-01 PROCEDURE — 85027 COMPLETE CBC AUTOMATED: CPT | Performed by: NURSE PRACTITIONER

## 2018-01-01 PROCEDURE — 99233 SBSQ HOSP IP/OBS HIGH 50: CPT | Mod: GC | Performed by: INTERNAL MEDICINE

## 2018-01-01 PROCEDURE — 80053 COMPREHEN METABOLIC PANEL: CPT | Performed by: EMERGENCY MEDICINE

## 2018-01-01 PROCEDURE — 99239 HOSP IP/OBS DSCHRG MGMT >30: CPT | Performed by: NURSE PRACTITIONER

## 2018-01-01 PROCEDURE — 99232 SBSQ HOSP IP/OBS MODERATE 35: CPT | Mod: 25 | Performed by: NURSE PRACTITIONER

## 2018-01-01 PROCEDURE — 25000132 ZZH RX MED GY IP 250 OP 250 PS 637: Performed by: PHARMACIST

## 2018-01-01 PROCEDURE — 97602 WOUND(S) CARE NON-SELECTIVE: CPT

## 2018-01-01 PROCEDURE — 85027 COMPLETE CBC AUTOMATED: CPT | Performed by: PSYCHIATRY & NEUROLOGY

## 2018-01-01 PROCEDURE — T1013 SIGN LANG/ORAL INTERPRETER: HCPCS | Mod: U3

## 2018-01-01 PROCEDURE — 80177 DRUG SCRN QUAN LEVETIRACETAM: CPT | Performed by: MARRIAGE & FAMILY THERAPIST

## 2018-01-01 PROCEDURE — 86901 BLOOD TYPING SEROLOGIC RH(D): CPT | Performed by: INTERNAL MEDICINE

## 2018-01-01 PROCEDURE — 87491 CHLMYD TRACH DNA AMP PROBE: CPT | Performed by: STUDENT IN AN ORGANIZED HEALTH CARE EDUCATION/TRAINING PROGRAM

## 2018-01-01 PROCEDURE — 25000128 H RX IP 250 OP 636

## 2018-01-01 PROCEDURE — 80048 BASIC METABOLIC PNL TOTAL CA: CPT | Performed by: INTERNAL MEDICINE

## 2018-01-01 PROCEDURE — 85730 THROMBOPLASTIN TIME PARTIAL: CPT | Performed by: EMERGENCY MEDICINE

## 2018-01-01 PROCEDURE — 94660 CPAP INITIATION&MGMT: CPT

## 2018-01-01 PROCEDURE — 93750 INTERROGATION VAD IN PERSON: CPT | Mod: ZF

## 2018-01-01 PROCEDURE — G0463 HOSPITAL OUTPT CLINIC VISIT: HCPCS | Mod: 25,ZF

## 2018-01-01 PROCEDURE — 25000132 ZZH RX MED GY IP 250 OP 250 PS 637: Performed by: MARRIAGE & FAMILY THERAPIST

## 2018-01-01 PROCEDURE — 83605 ASSAY OF LACTIC ACID: CPT | Performed by: EMERGENCY MEDICINE

## 2018-01-01 PROCEDURE — 99239 HOSP IP/OBS DSCHRG MGMT >30: CPT | Performed by: INTERNAL MEDICINE

## 2018-01-01 PROCEDURE — 87493 C DIFF AMPLIFIED PROBE: CPT | Performed by: INTERNAL MEDICINE

## 2018-01-01 PROCEDURE — 25000125 ZZHC RX 250: Performed by: RADIOLOGY

## 2018-01-01 PROCEDURE — 40000556 ZZH STATISTIC PERIPHERAL IV START W US GUIDANCE

## 2018-01-01 PROCEDURE — 83051 HEMOGLOBIN PLASMA: CPT | Performed by: EMERGENCY MEDICINE

## 2018-01-01 PROCEDURE — 87040 BLOOD CULTURE FOR BACTERIA: CPT | Performed by: NURSE PRACTITIONER

## 2018-01-01 PROCEDURE — 85610 PROTHROMBIN TIME: CPT | Performed by: INTERNAL MEDICINE

## 2018-01-01 PROCEDURE — 27210995 ZZH RX 272: Performed by: EMERGENCY MEDICINE

## 2018-01-01 PROCEDURE — 25000125 ZZHC RX 250: Performed by: STUDENT IN AN ORGANIZED HEALTH CARE EDUCATION/TRAINING PROGRAM

## 2018-01-01 PROCEDURE — 36600 WITHDRAWAL OF ARTERIAL BLOOD: CPT

## 2018-01-01 PROCEDURE — 20000004 ZZH R&B ICU UMMC

## 2018-01-01 PROCEDURE — 92610 EVALUATE SWALLOWING FUNCTION: CPT | Mod: GN | Performed by: SPEECH-LANGUAGE PATHOLOGIST

## 2018-01-01 PROCEDURE — 82728 ASSAY OF FERRITIN: CPT | Performed by: MARRIAGE & FAMILY THERAPIST

## 2018-01-01 PROCEDURE — 83615 LACTATE (LD) (LDH) ENZYME: CPT | Performed by: EMERGENCY MEDICINE

## 2018-01-01 PROCEDURE — 25000132 ZZH RX MED GY IP 250 OP 250 PS 637: Performed by: EMERGENCY MEDICINE

## 2018-01-01 PROCEDURE — 87086 URINE CULTURE/COLONY COUNT: CPT | Performed by: STUDENT IN AN ORGANIZED HEALTH CARE EDUCATION/TRAINING PROGRAM

## 2018-01-01 PROCEDURE — 83605 ASSAY OF LACTIC ACID: CPT | Performed by: INTERNAL MEDICINE

## 2018-01-01 PROCEDURE — 96375 TX/PRO/DX INJ NEW DRUG ADDON: CPT | Performed by: EMERGENCY MEDICINE

## 2018-01-01 PROCEDURE — 84484 ASSAY OF TROPONIN QUANT: CPT | Performed by: EMERGENCY MEDICINE

## 2018-01-01 PROCEDURE — 96361 HYDRATE IV INFUSION ADD-ON: CPT | Performed by: EMERGENCY MEDICINE

## 2018-01-01 PROCEDURE — 83540 ASSAY OF IRON: CPT | Performed by: MARRIAGE & FAMILY THERAPIST

## 2018-01-01 PROCEDURE — 12000006 ZZH R&B IMCU INTERMEDIATE UMMC

## 2018-01-01 PROCEDURE — 93321 DOPPLER ECHO F-UP/LMTD STD: CPT | Mod: 26 | Performed by: INTERNAL MEDICINE

## 2018-01-01 PROCEDURE — 96374 THER/PROPH/DIAG INJ IV PUSH: CPT | Performed by: EMERGENCY MEDICINE

## 2018-01-01 PROCEDURE — 80061 LIPID PANEL: CPT | Performed by: STUDENT IN AN ORGANIZED HEALTH CARE EDUCATION/TRAINING PROGRAM

## 2018-01-01 PROCEDURE — 99214 OFFICE O/P EST MOD 30 MIN: CPT | Mod: 25 | Performed by: INTERNAL MEDICINE

## 2018-01-01 PROCEDURE — 71045 X-RAY EXAM CHEST 1 VIEW: CPT

## 2018-01-01 PROCEDURE — 99215 OFFICE O/P EST HI 40 MIN: CPT | Mod: 25 | Performed by: NURSE PRACTITIONER

## 2018-01-01 PROCEDURE — 85520 HEPARIN ASSAY: CPT | Performed by: MARRIAGE & FAMILY THERAPIST

## 2018-01-01 PROCEDURE — 82435 ASSAY OF BLOOD CHLORIDE: CPT

## 2018-01-01 PROCEDURE — 93282 PRGRMG EVAL IMPLANTABLE DFB: CPT | Mod: 26 | Performed by: INTERNAL MEDICINE

## 2018-01-01 PROCEDURE — 83735 ASSAY OF MAGNESIUM: CPT | Performed by: MARRIAGE & FAMILY THERAPIST

## 2018-01-01 PROCEDURE — 93750 INTERROGATION VAD IN PERSON: CPT | Mod: ZP | Performed by: NURSE PRACTITIONER

## 2018-01-01 PROCEDURE — 43761 REPOSITION GASTROSTOMY TUBE: CPT

## 2018-01-01 PROCEDURE — 40000893 ZZH STATISTIC PT IP EVAL DEFER: Performed by: PHYSICAL THERAPIST

## 2018-01-01 PROCEDURE — 40000225 ZZH STATISTIC SLP WARD VISIT

## 2018-01-01 PROCEDURE — 87077 CULTURE AEROBIC IDENTIFY: CPT | Performed by: STUDENT IN AN ORGANIZED HEALTH CARE EDUCATION/TRAINING PROGRAM

## 2018-01-01 PROCEDURE — 84550 ASSAY OF BLOOD/URIC ACID: CPT | Performed by: MARRIAGE & FAMILY THERAPIST

## 2018-01-01 PROCEDURE — 87186 SC STD MICRODIL/AGAR DIL: CPT | Performed by: EMERGENCY MEDICINE

## 2018-01-01 PROCEDURE — 80048 BASIC METABOLIC PNL TOTAL CA: CPT | Performed by: NURSE PRACTITIONER

## 2018-01-01 PROCEDURE — 83735 ASSAY OF MAGNESIUM: CPT | Performed by: NURSE PRACTITIONER

## 2018-01-01 PROCEDURE — 80048 BASIC METABOLIC PNL TOTAL CA: CPT | Performed by: EMERGENCY MEDICINE

## 2018-01-01 PROCEDURE — 85520 HEPARIN ASSAY: CPT | Performed by: INTERNAL MEDICINE

## 2018-01-01 PROCEDURE — 83605 ASSAY OF LACTIC ACID: CPT | Performed by: STUDENT IN AN ORGANIZED HEALTH CARE EDUCATION/TRAINING PROGRAM

## 2018-01-01 PROCEDURE — 40000894 ZZH STATISTIC OT IP EVAL DEFER: Performed by: OCCUPATIONAL THERAPIST

## 2018-01-01 PROCEDURE — 84295 ASSAY OF SERUM SODIUM: CPT | Performed by: PSYCHIATRY & NEUROLOGY

## 2018-01-01 PROCEDURE — 96366 THER/PROPH/DIAG IV INF ADDON: CPT | Performed by: EMERGENCY MEDICINE

## 2018-01-01 PROCEDURE — 93296 REM INTERROG EVL PM/IDS: CPT | Performed by: FAMILY MEDICINE

## 2018-01-01 PROCEDURE — 36415 COLL VENOUS BLD VENIPUNCTURE: CPT | Performed by: MARRIAGE & FAMILY THERAPIST

## 2018-01-01 PROCEDURE — 85610 PROTHROMBIN TIME: CPT | Performed by: NURSE PRACTITIONER

## 2018-01-01 PROCEDURE — 84100 ASSAY OF PHOSPHORUS: CPT | Performed by: INTERNAL MEDICINE

## 2018-01-01 PROCEDURE — 99605 MTMS BY PHARM NP 15 MIN: CPT | Mod: GY | Performed by: PHARMACIST

## 2018-01-01 PROCEDURE — 85027 COMPLETE CBC AUTOMATED: CPT | Performed by: STUDENT IN AN ORGANIZED HEALTH CARE EDUCATION/TRAINING PROGRAM

## 2018-01-01 PROCEDURE — 93005 ELECTROCARDIOGRAM TRACING: CPT

## 2018-01-01 PROCEDURE — 40000802 ZZH SITE CHECK

## 2018-01-01 PROCEDURE — 84100 ASSAY OF PHOSPHORUS: CPT

## 2018-01-01 PROCEDURE — 25000128 H RX IP 250 OP 636: Performed by: PSYCHIATRY & NEUROLOGY

## 2018-01-01 PROCEDURE — 40000559 ZZH STATISTIC FAILED PERIPHERAL IV START

## 2018-01-01 PROCEDURE — 99232 SBSQ HOSP IP/OBS MODERATE 35: CPT | Performed by: NURSE PRACTITIONER

## 2018-01-01 PROCEDURE — 83615 LACTATE (LD) (LDH) ENZYME: CPT | Performed by: STUDENT IN AN ORGANIZED HEALTH CARE EDUCATION/TRAINING PROGRAM

## 2018-01-01 PROCEDURE — 87040 BLOOD CULTURE FOR BACTERIA: CPT | Performed by: INTERNAL MEDICINE

## 2018-01-01 PROCEDURE — 82803 BLOOD GASES ANY COMBINATION: CPT | Performed by: INTERNAL MEDICINE

## 2018-01-01 PROCEDURE — 93325 DOPPLER ECHO COLOR FLOW MAPG: CPT | Mod: 26 | Performed by: INTERNAL MEDICINE

## 2018-01-01 PROCEDURE — 40000133 ZZH STATISTIC OT WARD VISIT

## 2018-01-01 PROCEDURE — 93005 ELECTROCARDIOGRAM TRACING: CPT | Performed by: EMERGENCY MEDICINE

## 2018-01-01 PROCEDURE — 99214 OFFICE O/P EST MOD 30 MIN: CPT | Mod: 25 | Performed by: NURSE PRACTITIONER

## 2018-01-01 PROCEDURE — 40000275 ZZH STATISTIC RCP TIME EA 10 MIN

## 2018-01-01 PROCEDURE — 85025 COMPLETE CBC W/AUTO DIFF WBC: CPT | Performed by: STUDENT IN AN ORGANIZED HEALTH CARE EDUCATION/TRAINING PROGRAM

## 2018-01-01 PROCEDURE — 87206 SMEAR FLUORESCENT/ACID STAI: CPT | Performed by: INTERNAL MEDICINE

## 2018-01-01 PROCEDURE — 97535 SELF CARE MNGMENT TRAINING: CPT | Mod: GO

## 2018-01-01 PROCEDURE — 82040 ASSAY OF SERUM ALBUMIN: CPT

## 2018-01-01 PROCEDURE — 25000128 H RX IP 250 OP 636: Performed by: NURSE PRACTITIONER

## 2018-01-01 PROCEDURE — 81001 URINALYSIS AUTO W/SCOPE: CPT | Performed by: STUDENT IN AN ORGANIZED HEALTH CARE EDUCATION/TRAINING PROGRAM

## 2018-01-01 PROCEDURE — 93010 ELECTROCARDIOGRAM REPORT: CPT | Mod: ZP | Performed by: INTERNAL MEDICINE

## 2018-01-01 PROCEDURE — 93282 PRGRMG EVAL IMPLANTABLE DFB: CPT | Mod: ZF

## 2018-01-01 PROCEDURE — 83615 LACTATE (LD) (LDH) ENZYME: CPT | Performed by: MARRIAGE & FAMILY THERAPIST

## 2018-01-01 PROCEDURE — G0180 MD CERTIFICATION HHA PATIENT: HCPCS | Performed by: FAMILY MEDICINE

## 2018-01-01 PROCEDURE — 92611 MOTION FLUOROSCOPY/SWALLOW: CPT | Mod: GN

## 2018-01-01 PROCEDURE — 86850 RBC ANTIBODY SCREEN: CPT | Performed by: EMERGENCY MEDICINE

## 2018-01-01 PROCEDURE — 99284 EMERGENCY DEPT VISIT MOD MDM: CPT | Mod: Z6 | Performed by: EMERGENCY MEDICINE

## 2018-01-01 PROCEDURE — 93308 TTE F-UP OR LMTD: CPT

## 2018-01-01 PROCEDURE — 93296 REM INTERROG EVL PM/IDS: CPT | Mod: ZF

## 2018-01-01 PROCEDURE — 99283 EMERGENCY DEPT VISIT LOW MDM: CPT | Mod: 25 | Performed by: FAMILY MEDICINE

## 2018-01-01 PROCEDURE — 74176 CT ABD & PELVIS W/O CONTRAST: CPT

## 2018-01-01 PROCEDURE — 82962 GLUCOSE BLOOD TEST: CPT

## 2018-01-01 PROCEDURE — 99233 SBSQ HOSP IP/OBS HIGH 50: CPT | Mod: 25 | Performed by: INTERNAL MEDICINE

## 2018-01-01 PROCEDURE — 87088 URINE BACTERIA CULTURE: CPT | Performed by: STUDENT IN AN ORGANIZED HEALTH CARE EDUCATION/TRAINING PROGRAM

## 2018-01-01 PROCEDURE — 40000257 ZZH STATISTIC CONSULT NO CHARGE VASC ACCESS

## 2018-01-01 PROCEDURE — 81001 URINALYSIS AUTO W/SCOPE: CPT | Performed by: MARRIAGE & FAMILY THERAPIST

## 2018-01-01 PROCEDURE — 93010 ELECTROCARDIOGRAM REPORT: CPT | Mod: Z6 | Performed by: EMERGENCY MEDICINE

## 2018-01-01 PROCEDURE — 80053 COMPREHEN METABOLIC PANEL: CPT | Performed by: INTERNAL MEDICINE

## 2018-01-01 PROCEDURE — 84484 ASSAY OF TROPONIN QUANT: CPT

## 2018-01-01 PROCEDURE — 87040 BLOOD CULTURE FOR BACTERIA: CPT | Performed by: STUDENT IN AN ORGANIZED HEALTH CARE EDUCATION/TRAINING PROGRAM

## 2018-01-01 PROCEDURE — 85520 HEPARIN ASSAY: CPT | Performed by: PSYCHIATRY & NEUROLOGY

## 2018-01-01 PROCEDURE — 85520 HEPARIN ASSAY: CPT | Performed by: NURSE PRACTITIONER

## 2018-01-01 PROCEDURE — 84484 ASSAY OF TROPONIN QUANT: CPT | Mod: 91 | Performed by: EMERGENCY MEDICINE

## 2018-01-01 PROCEDURE — 40000558 ZZH STATISTIC CVC DRESSING CHANGE

## 2018-01-01 PROCEDURE — 85045 AUTOMATED RETICULOCYTE COUNT: CPT | Performed by: STUDENT IN AN ORGANIZED HEALTH CARE EDUCATION/TRAINING PROGRAM

## 2018-01-01 PROCEDURE — 87186 SC STD MICRODIL/AGAR DIL: CPT | Performed by: STUDENT IN AN ORGANIZED HEALTH CARE EDUCATION/TRAINING PROGRAM

## 2018-01-01 PROCEDURE — 25000128 H RX IP 250 OP 636: Performed by: RADIOLOGY

## 2018-01-01 PROCEDURE — G0463 HOSPITAL OUTPT CLINIC VISIT: HCPCS

## 2018-01-01 PROCEDURE — 25000131 ZZH RX MED GY IP 250 OP 636 PS 637: Performed by: INTERNAL MEDICINE

## 2018-01-01 PROCEDURE — 86900 BLOOD TYPING SEROLOGIC ABO: CPT | Performed by: EMERGENCY MEDICINE

## 2018-01-01 PROCEDURE — 81001 URINALYSIS AUTO W/SCOPE: CPT | Performed by: NURSE PRACTITIONER

## 2018-01-01 PROCEDURE — 99284 EMERGENCY DEPT VISIT MOD MDM: CPT | Mod: Z6 | Performed by: FAMILY MEDICINE

## 2018-01-01 PROCEDURE — 83874 ASSAY OF MYOGLOBIN: CPT | Performed by: MARRIAGE & FAMILY THERAPIST

## 2018-01-01 PROCEDURE — 82140 ASSAY OF AMMONIA: CPT | Performed by: PSYCHIATRY & NEUROLOGY

## 2018-01-01 PROCEDURE — 85025 COMPLETE CBC W/AUTO DIFF WBC: CPT | Performed by: PSYCHIATRY & NEUROLOGY

## 2018-01-01 PROCEDURE — 44500 INTRO GASTROINTESTINAL TUBE: CPT

## 2018-01-01 PROCEDURE — 87116 MYCOBACTERIA CULTURE: CPT | Performed by: INTERNAL MEDICINE

## 2018-01-01 PROCEDURE — 80061 LIPID PANEL: CPT | Performed by: MARRIAGE & FAMILY THERAPIST

## 2018-01-01 PROCEDURE — 93750 INTERROGATION VAD IN PERSON: CPT | Mod: ZF | Performed by: NURSE PRACTITIONER

## 2018-01-01 PROCEDURE — 74018 RADEX ABDOMEN 1 VIEW: CPT

## 2018-01-01 PROCEDURE — 86141 C-REACTIVE PROTEIN HS: CPT | Performed by: STUDENT IN AN ORGANIZED HEALTH CARE EDUCATION/TRAINING PROGRAM

## 2018-01-01 PROCEDURE — 84145 PROCALCITONIN (PCT): CPT | Performed by: PSYCHIATRY & NEUROLOGY

## 2018-01-01 PROCEDURE — 93308 TTE F-UP OR LMTD: CPT | Mod: 26 | Performed by: INTERNAL MEDICINE

## 2018-01-01 PROCEDURE — 99284 EMERGENCY DEPT VISIT MOD MDM: CPT | Mod: 25 | Performed by: EMERGENCY MEDICINE

## 2018-01-01 PROCEDURE — 82565 ASSAY OF CREATININE: CPT | Performed by: STUDENT IN AN ORGANIZED HEALTH CARE EDUCATION/TRAINING PROGRAM

## 2018-01-01 PROCEDURE — 36415 COLL VENOUS BLD VENIPUNCTURE: CPT | Mod: ZF

## 2018-01-01 PROCEDURE — 95951 ZZHC EEG VIDEO EACH 24 HR: CPT | Mod: ZF

## 2018-01-01 PROCEDURE — 82272 OCCULT BLD FECES 1-3 TESTS: CPT | Performed by: STUDENT IN AN ORGANIZED HEALTH CARE EDUCATION/TRAINING PROGRAM

## 2018-01-01 PROCEDURE — 51701 INSERT BLADDER CATHETER: CPT | Performed by: EMERGENCY MEDICINE

## 2018-01-01 PROCEDURE — 84484 ASSAY OF TROPONIN QUANT: CPT | Performed by: MARRIAGE & FAMILY THERAPIST

## 2018-01-01 PROCEDURE — 99222 1ST HOSP IP/OBS MODERATE 55: CPT | Mod: 25 | Performed by: INTERNAL MEDICINE

## 2018-01-01 PROCEDURE — 86901 BLOOD TYPING SEROLOGIC RH(D): CPT | Performed by: PSYCHIATRY & NEUROLOGY

## 2018-01-01 PROCEDURE — 87015 SPECIMEN INFECT AGNT CONCNTJ: CPT | Performed by: INTERNAL MEDICINE

## 2018-01-01 PROCEDURE — 99349 HOME/RES VST EST MOD MDM 40: CPT | Performed by: FAMILY MEDICINE

## 2018-01-01 PROCEDURE — 82728 ASSAY OF FERRITIN: CPT | Performed by: STUDENT IN AN ORGANIZED HEALTH CARE EDUCATION/TRAINING PROGRAM

## 2018-01-01 PROCEDURE — G0179 MD RECERTIFICATION HHA PT: HCPCS | Performed by: FAMILY MEDICINE

## 2018-01-01 PROCEDURE — 83550 IRON BINDING TEST: CPT | Performed by: MARRIAGE & FAMILY THERAPIST

## 2018-01-01 PROCEDURE — 84075 ASSAY ALKALINE PHOSPHATASE: CPT

## 2018-01-01 PROCEDURE — 71045 X-RAY EXAM CHEST 1 VIEW: CPT | Mod: 77

## 2018-01-01 PROCEDURE — 99238 HOSP IP/OBS DSCHRG MGMT 30/<: CPT | Performed by: NURSE PRACTITIONER

## 2018-01-01 PROCEDURE — 74230 X-RAY XM SWLNG FUNCJ C+: CPT

## 2018-01-01 PROCEDURE — 92526 ORAL FUNCTION THERAPY: CPT | Mod: GN

## 2018-01-01 PROCEDURE — 84295 ASSAY OF SERUM SODIUM: CPT | Performed by: INTERNAL MEDICINE

## 2018-01-01 PROCEDURE — 99233 SBSQ HOSP IP/OBS HIGH 50: CPT | Performed by: INTERNAL MEDICINE

## 2018-01-01 PROCEDURE — 87086 URINE CULTURE/COLONY COUNT: CPT | Performed by: NURSE PRACTITIONER

## 2018-01-01 PROCEDURE — 99285 EMERGENCY DEPT VISIT HI MDM: CPT | Mod: 25,27 | Performed by: EMERGENCY MEDICINE

## 2018-01-01 PROCEDURE — 96360 HYDRATION IV INFUSION INIT: CPT | Performed by: EMERGENCY MEDICINE

## 2018-01-01 PROCEDURE — 84132 ASSAY OF SERUM POTASSIUM: CPT

## 2018-01-01 PROCEDURE — 84466 ASSAY OF TRANSFERRIN: CPT | Performed by: MARRIAGE & FAMILY THERAPIST

## 2018-01-01 PROCEDURE — 70498 CT ANGIOGRAPHY NECK: CPT

## 2018-01-01 PROCEDURE — 25000128 H RX IP 250 OP 636: Performed by: MARRIAGE & FAMILY THERAPIST

## 2018-01-01 PROCEDURE — 80177 DRUG SCRN QUAN LEVETIRACETAM: CPT | Performed by: STUDENT IN AN ORGANIZED HEALTH CARE EDUCATION/TRAINING PROGRAM

## 2018-01-01 PROCEDURE — 84100 ASSAY OF PHOSPHORUS: CPT | Performed by: NURSE PRACTITIONER

## 2018-01-01 PROCEDURE — 84145 PROCALCITONIN (PCT): CPT | Performed by: EMERGENCY MEDICINE

## 2018-01-01 PROCEDURE — 86901 BLOOD TYPING SEROLOGIC RH(D): CPT | Performed by: EMERGENCY MEDICINE

## 2018-01-01 PROCEDURE — 99239 HOSP IP/OBS DSCHRG MGMT >30: CPT | Mod: GC | Performed by: INTERNAL MEDICINE

## 2018-01-01 PROCEDURE — G0463 HOSPITAL OUTPT CLINIC VISIT: HCPCS | Mod: ZF

## 2018-01-01 PROCEDURE — 82274 ASSAY TEST FOR BLOOD FECAL: CPT | Performed by: PSYCHIATRY & NEUROLOGY

## 2018-01-01 PROCEDURE — 86900 BLOOD TYPING SEROLOGIC ABO: CPT | Performed by: INTERNAL MEDICINE

## 2018-01-01 PROCEDURE — 99607 MTMS BY PHARM ADDL 15 MIN: CPT | Mod: GY | Performed by: PHARMACIST

## 2018-01-01 PROCEDURE — 86850 RBC ANTIBODY SCREEN: CPT | Performed by: INTERNAL MEDICINE

## 2018-01-01 PROCEDURE — 86900 BLOOD TYPING SEROLOGIC ABO: CPT | Performed by: PSYCHIATRY & NEUROLOGY

## 2018-01-01 PROCEDURE — 85018 HEMOGLOBIN: CPT | Performed by: STUDENT IN AN ORGANIZED HEALTH CARE EDUCATION/TRAINING PROGRAM

## 2018-01-01 PROCEDURE — 95951 ZZHC EEG VIDEO < 12 HR: CPT | Mod: 52,ZF

## 2018-01-01 PROCEDURE — 93295 DEV INTERROG REMOTE 1/2/MLT: CPT | Performed by: INTERNAL MEDICINE

## 2018-01-01 PROCEDURE — 99285 EMERGENCY DEPT VISIT HI MDM: CPT | Mod: Z6 | Performed by: EMERGENCY MEDICINE

## 2018-01-01 PROCEDURE — 25800025 ZZH RX 258: Performed by: STUDENT IN AN ORGANIZED HEALTH CARE EDUCATION/TRAINING PROGRAM

## 2018-01-01 PROCEDURE — 80061 LIPID PANEL: CPT | Performed by: EMERGENCY MEDICINE

## 2018-01-01 PROCEDURE — 71250 CT THORAX DX C-: CPT

## 2018-01-01 RX ORDER — DOXYCYCLINE 100 MG/1
100 CAPSULE ORAL 2 TIMES DAILY
Qty: 10 CAPSULE | Refills: 0 | Status: SHIPPED | OUTPATIENT
Start: 2018-01-01 | End: 2018-01-01

## 2018-01-01 RX ORDER — METOPROLOL SUCCINATE 25 MG/1
25 TABLET, EXTENDED RELEASE ORAL AT BEDTIME
Status: DISCONTINUED | OUTPATIENT
Start: 2018-01-01 | End: 2018-01-01 | Stop reason: HOSPADM

## 2018-01-01 RX ORDER — AMINO ACIDS/PROTEIN HYDROLYS 11G-40/45
1 LIQUID IN PACKET (ML) ORAL 3 TIMES DAILY
Qty: 90 PACKET | Refills: 3 | Status: SHIPPED | OUTPATIENT
Start: 2018-01-01

## 2018-01-01 RX ORDER — LINEZOLID 600 MG/1
600 TABLET, FILM COATED ORAL EVERY 12 HOURS SCHEDULED
Status: DISCONTINUED | OUTPATIENT
Start: 2018-01-01 | End: 2018-01-01 | Stop reason: HOSPADM

## 2018-01-01 RX ORDER — LEVETIRACETAM 100 MG/ML
500 SOLUTION ORAL EVERY 12 HOURS SCHEDULED
Status: DISCONTINUED | OUTPATIENT
Start: 2018-01-01 | End: 2018-01-01 | Stop reason: HOSPADM

## 2018-01-01 RX ORDER — DEXTROSE MONOHYDRATE 25 G/50ML
25-50 INJECTION, SOLUTION INTRAVENOUS
Status: DISCONTINUED | OUTPATIENT
Start: 2018-01-01 | End: 2018-01-01

## 2018-01-01 RX ORDER — SIMETHICONE 80 MG
80 TABLET,CHEWABLE ORAL 4 TIMES DAILY
Status: DISCONTINUED | OUTPATIENT
Start: 2018-01-01 | End: 2018-01-01 | Stop reason: HOSPADM

## 2018-01-01 RX ORDER — PANTOPRAZOLE SODIUM 20 MG/1
20 TABLET, DELAYED RELEASE ORAL
Status: DISCONTINUED | OUTPATIENT
Start: 2018-01-01 | End: 2018-01-01 | Stop reason: HOSPADM

## 2018-01-01 RX ORDER — LINEZOLID 2 MG/ML
600 INJECTION, SOLUTION INTRAVENOUS EVERY 12 HOURS
Status: DISCONTINUED | OUTPATIENT
Start: 2018-01-01 | End: 2018-01-01

## 2018-01-01 RX ORDER — FINASTERIDE 5 MG/1
5 TABLET, FILM COATED ORAL DAILY
Status: DISCONTINUED | OUTPATIENT
Start: 2018-01-01 | End: 2018-01-01 | Stop reason: HOSPADM

## 2018-01-01 RX ORDER — TAMSULOSIN HYDROCHLORIDE 0.4 MG/1
0.8 CAPSULE ORAL DAILY
Status: DISCONTINUED | OUTPATIENT
Start: 2018-01-01 | End: 2018-01-01

## 2018-01-01 RX ORDER — WARFARIN SODIUM 3 MG/1
3 TABLET ORAL
Status: COMPLETED | OUTPATIENT
Start: 2018-01-01 | End: 2018-01-01

## 2018-01-01 RX ORDER — LEVETIRACETAM 750 MG/1
750 TABLET ORAL 2 TIMES DAILY
Status: DISCONTINUED | OUTPATIENT
Start: 2018-01-01 | End: 2018-01-01 | Stop reason: HOSPADM

## 2018-01-01 RX ORDER — AMOXICILLIN 250 MG
2 CAPSULE ORAL 2 TIMES DAILY
Status: DISCONTINUED | OUTPATIENT
Start: 2018-01-01 | End: 2018-01-01 | Stop reason: HOSPADM

## 2018-01-01 RX ORDER — LIDOCAINE HYDROCHLORIDE 10 MG/ML
INJECTION, SOLUTION EPIDURAL; INFILTRATION; INTRACAUDAL; PERINEURAL
Status: DISCONTINUED
Start: 2018-01-01 | End: 2018-01-01 | Stop reason: HOSPADM

## 2018-01-01 RX ORDER — ONDANSETRON 4 MG/1
4-8 TABLET, ORALLY DISINTEGRATING ORAL EVERY 8 HOURS PRN
Qty: 20 TABLET | Refills: 1 | Status: SHIPPED | OUTPATIENT
Start: 2018-01-01 | End: 2018-01-01

## 2018-01-01 RX ORDER — OXYCODONE HYDROCHLORIDE 5 MG/1
5 TABLET ORAL EVERY 4 HOURS PRN
Status: DISCONTINUED | OUTPATIENT
Start: 2018-01-01 | End: 2018-01-01 | Stop reason: HOSPADM

## 2018-01-01 RX ORDER — IPRATROPIUM BROMIDE AND ALBUTEROL SULFATE 2.5; .5 MG/3ML; MG/3ML
1 SOLUTION RESPIRATORY (INHALATION) EVERY 6 HOURS PRN
Status: DISCONTINUED | OUTPATIENT
Start: 2018-01-01 | End: 2018-01-01 | Stop reason: HOSPADM

## 2018-01-01 RX ORDER — TAMSULOSIN HYDROCHLORIDE 0.4 MG/1
0.8 CAPSULE ORAL DAILY
Status: DISCONTINUED | OUTPATIENT
Start: 2018-01-01 | End: 2018-01-01 | Stop reason: HOSPADM

## 2018-01-01 RX ORDER — AMOXICILLIN 250 MG
2 CAPSULE ORAL 2 TIMES DAILY PRN
Status: DISCONTINUED | OUTPATIENT
Start: 2018-01-01 | End: 2018-01-01 | Stop reason: HOSPADM

## 2018-01-01 RX ORDER — WARFARIN SODIUM 2 MG/1
2 TABLET ORAL
Status: COMPLETED | OUTPATIENT
Start: 2018-01-01 | End: 2018-01-01

## 2018-01-01 RX ORDER — WARFARIN SODIUM 6 MG/1
6 TABLET ORAL
Status: COMPLETED | OUTPATIENT
Start: 2018-01-01 | End: 2018-01-01

## 2018-01-01 RX ORDER — DEXTROSE MONOHYDRATE 25 G/50ML
25-50 INJECTION, SOLUTION INTRAVENOUS
Status: DISCONTINUED | OUTPATIENT
Start: 2018-01-01 | End: 2018-01-01 | Stop reason: HOSPADM

## 2018-01-01 RX ORDER — IPRATROPIUM BROMIDE AND ALBUTEROL SULFATE 2.5; .5 MG/3ML; MG/3ML
1 SOLUTION RESPIRATORY (INHALATION) EVERY 6 HOURS PRN
Qty: 360 ML | Refills: 11 | Status: ON HOLD | OUTPATIENT
Start: 2018-01-01 | End: 2018-01-01

## 2018-01-01 RX ORDER — LORAZEPAM 2 MG/ML
1 INJECTION INTRAMUSCULAR ONCE
Status: COMPLETED | OUTPATIENT
Start: 2018-01-01 | End: 2018-01-01

## 2018-01-01 RX ORDER — TAMSULOSIN HYDROCHLORIDE 0.4 MG/1
0.4 CAPSULE ORAL DAILY
COMMUNITY

## 2018-01-01 RX ORDER — NALOXONE HYDROCHLORIDE 0.4 MG/ML
.1-.4 INJECTION, SOLUTION INTRAMUSCULAR; INTRAVENOUS; SUBCUTANEOUS
Status: DISCONTINUED | OUTPATIENT
Start: 2018-01-01 | End: 2018-01-01 | Stop reason: HOSPADM

## 2018-01-01 RX ORDER — POTASSIUM CL/LIDO/0.9 % NACL 10MEQ/0.1L
10 INTRAVENOUS SOLUTION, PIGGYBACK (ML) INTRAVENOUS
Status: DISCONTINUED | OUTPATIENT
Start: 2018-01-01 | End: 2018-01-01 | Stop reason: HOSPADM

## 2018-01-01 RX ORDER — ASPIRIN 325 MG
325 TABLET ORAL DAILY
Status: DISCONTINUED | OUTPATIENT
Start: 2018-01-01 | End: 2018-01-01 | Stop reason: HOSPADM

## 2018-01-01 RX ORDER — ONDANSETRON 2 MG/ML
4 INJECTION INTRAMUSCULAR; INTRAVENOUS EVERY 6 HOURS PRN
Status: DISCONTINUED | OUTPATIENT
Start: 2018-01-01 | End: 2018-01-01 | Stop reason: HOSPADM

## 2018-01-01 RX ORDER — MORPHINE SULFATE 2 MG/ML
INJECTION, SOLUTION INTRAMUSCULAR; INTRAVENOUS
Status: DISCONTINUED
Start: 2018-01-01 | End: 2018-01-01 | Stop reason: WASHOUT

## 2018-01-01 RX ORDER — NITROGLYCERIN 0.4 MG/1
0.4 TABLET SUBLINGUAL EVERY 5 MIN PRN
Qty: 30 TABLET | Refills: 1 | Status: SHIPPED | OUTPATIENT
Start: 2018-01-01

## 2018-01-01 RX ORDER — SODIUM CHLORIDE 9 MG/ML
INJECTION, SOLUTION INTRAVENOUS CONTINUOUS
Status: DISCONTINUED | OUTPATIENT
Start: 2018-01-01 | End: 2018-01-01

## 2018-01-01 RX ORDER — CEPHALEXIN 500 MG/1
500 CAPSULE ORAL ONCE
Status: DISCONTINUED | OUTPATIENT
Start: 2018-01-01 | End: 2018-01-01 | Stop reason: HOSPADM

## 2018-01-01 RX ORDER — LEVETIRACETAM 100 MG/ML
750 SOLUTION ORAL EVERY 12 HOURS
Status: DISCONTINUED | OUTPATIENT
Start: 2018-01-01 | End: 2018-01-01

## 2018-01-01 RX ORDER — FINASTERIDE 5 MG/1
5 TABLET, FILM COATED ORAL DAILY
Status: DISCONTINUED | OUTPATIENT
Start: 2018-01-01 | End: 2018-01-01

## 2018-01-01 RX ORDER — LEVOFLOXACIN 25 MG/ML
750 SOLUTION ORAL DAILY
Status: DISCONTINUED | OUTPATIENT
Start: 2018-01-01 | End: 2018-01-01 | Stop reason: HOSPADM

## 2018-01-01 RX ORDER — ONDANSETRON HYDROCHLORIDE 4 MG/5ML
4 SOLUTION ORAL 2 TIMES DAILY PRN
Status: DISCONTINUED | OUTPATIENT
Start: 2018-01-01 | End: 2018-01-01 | Stop reason: DRUGHIGH

## 2018-01-01 RX ORDER — DOXYCYCLINE 100 MG/1
100 CAPSULE ORAL 2 TIMES DAILY
Qty: 14 CAPSULE | Refills: 0 | Status: SHIPPED | OUTPATIENT
Start: 2018-01-01 | End: 2018-01-01

## 2018-01-01 RX ORDER — PIPERACILLIN SODIUM, TAZOBACTAM SODIUM 3; .375 G/15ML; G/15ML
3.38 INJECTION, POWDER, LYOPHILIZED, FOR SOLUTION INTRAVENOUS ONCE
Status: COMPLETED | OUTPATIENT
Start: 2018-01-01 | End: 2018-01-01

## 2018-01-01 RX ORDER — WARFARIN SODIUM 2 MG/1
2 TABLET ORAL
Status: DISCONTINUED | OUTPATIENT
Start: 2018-01-01 | End: 2018-01-01

## 2018-01-01 RX ORDER — WARFARIN SODIUM 1 MG/1
3 TABLET ORAL DAILY
Qty: 120 TABLET | Refills: 11 | Status: SHIPPED | OUTPATIENT
Start: 2018-01-01 | End: 2018-01-01

## 2018-01-01 RX ORDER — PANTOPRAZOLE SODIUM 20 MG/1
20 TABLET, DELAYED RELEASE ORAL
Qty: 30 TABLET | Refills: 11 | Status: ON HOLD | OUTPATIENT
Start: 2018-01-01 | End: 2018-01-01

## 2018-01-01 RX ORDER — ACETAMINOPHEN 325 MG/1
650 TABLET ORAL EVERY 4 HOURS PRN
Status: DISCONTINUED | OUTPATIENT
Start: 2018-01-01 | End: 2018-01-01 | Stop reason: HOSPADM

## 2018-01-01 RX ORDER — PANTOPRAZOLE SODIUM 40 MG/1
40 TABLET, DELAYED RELEASE ORAL
Status: DISCONTINUED | OUTPATIENT
Start: 2018-01-01 | End: 2018-01-01 | Stop reason: HOSPADM

## 2018-01-01 RX ORDER — HEPARIN SODIUM 10000 [USP'U]/100ML
0-3500 INJECTION, SOLUTION INTRAVENOUS CONTINUOUS
Status: DISCONTINUED | OUTPATIENT
Start: 2018-01-01 | End: 2018-01-01 | Stop reason: HOSPADM

## 2018-01-01 RX ORDER — DOXYCYCLINE 100 MG/1
100 CAPSULE ORAL 2 TIMES DAILY
Qty: 20 CAPSULE | Refills: 0 | Status: SHIPPED | OUTPATIENT
Start: 2018-01-01 | End: 2018-01-01

## 2018-01-01 RX ORDER — OXYCODONE HCL 5 MG/5 ML
5 SOLUTION, ORAL ORAL EVERY 4 HOURS PRN
Qty: 473 ML | Refills: 0 | Status: SHIPPED | OUTPATIENT
Start: 2018-01-01

## 2018-01-01 RX ORDER — POTASSIUM CHLORIDE 29.8 MG/ML
20 INJECTION INTRAVENOUS
Status: DISCONTINUED | OUTPATIENT
Start: 2018-01-01 | End: 2018-01-01 | Stop reason: HOSPADM

## 2018-01-01 RX ORDER — WARFARIN SODIUM 2.5 MG/1
2.5 TABLET ORAL DAILY
Status: DISCONTINUED | OUTPATIENT
Start: 2018-01-01 | End: 2018-01-01 | Stop reason: CLARIF

## 2018-01-01 RX ORDER — FINASTERIDE 5 MG/1
5 TABLET, FILM COATED ORAL DAILY
Qty: 30 TABLET | Refills: 0 | Status: SHIPPED | OUTPATIENT
Start: 2018-01-01

## 2018-01-01 RX ORDER — CEFEPIME HYDROCHLORIDE 1 G/1
1 INJECTION, POWDER, FOR SOLUTION INTRAMUSCULAR; INTRAVENOUS EVERY 12 HOURS
Status: DISCONTINUED | OUTPATIENT
Start: 2018-01-01 | End: 2018-01-01

## 2018-01-01 RX ORDER — NICOTINE POLACRILEX 4 MG
15-30 LOZENGE BUCCAL
Status: DISCONTINUED | OUTPATIENT
Start: 2018-01-01 | End: 2018-01-01

## 2018-01-01 RX ORDER — POTASSIUM CHLORIDE 1.5 G/1.58G
20-40 POWDER, FOR SOLUTION ORAL
Status: DISCONTINUED | OUTPATIENT
Start: 2018-01-01 | End: 2018-01-01 | Stop reason: HOSPADM

## 2018-01-01 RX ORDER — BENZONATATE 100 MG/1
100 CAPSULE ORAL 3 TIMES DAILY PRN
Status: DISCONTINUED | OUTPATIENT
Start: 2018-01-01 | End: 2018-01-01 | Stop reason: HOSPADM

## 2018-01-01 RX ORDER — LOSARTAN POTASSIUM 25 MG/1
25 TABLET ORAL DAILY
Status: DISCONTINUED | OUTPATIENT
Start: 2018-01-01 | End: 2018-01-01 | Stop reason: HOSPADM

## 2018-01-01 RX ORDER — WARFARIN SODIUM 1 MG/1
2.5 TABLET ORAL DAILY
Qty: 30 TABLET | Refills: 1 | Status: ON HOLD | OUTPATIENT
Start: 2018-01-01 | End: 2018-01-01

## 2018-01-01 RX ORDER — NITROGLYCERIN 0.4 MG/1
0.4 TABLET SUBLINGUAL EVERY 5 MIN PRN
Status: DISCONTINUED | OUTPATIENT
Start: 2018-01-01 | End: 2018-01-01 | Stop reason: HOSPADM

## 2018-01-01 RX ORDER — SIMETHICONE 80 MG
80 TABLET,CHEWABLE ORAL EVERY 6 HOURS PRN
Status: DISCONTINUED | OUTPATIENT
Start: 2018-01-01 | End: 2018-01-01 | Stop reason: HOSPADM

## 2018-01-01 RX ORDER — FUROSEMIDE 10 MG/ML
20 INJECTION INTRAMUSCULAR; INTRAVENOUS ONCE
Status: COMPLETED | OUTPATIENT
Start: 2018-01-01 | End: 2018-01-01

## 2018-01-01 RX ORDER — AMOXICILLIN 250 MG
1-2 CAPSULE ORAL 2 TIMES DAILY PRN
Status: DISCONTINUED | OUTPATIENT
Start: 2018-01-01 | End: 2018-01-01 | Stop reason: HOSPADM

## 2018-01-01 RX ORDER — METOPROLOL SUCCINATE 50 MG/1
50 TABLET, EXTENDED RELEASE ORAL EVERY MORNING
Status: DISCONTINUED | OUTPATIENT
Start: 2018-01-01 | End: 2018-01-01 | Stop reason: HOSPADM

## 2018-01-01 RX ORDER — LORATADINE 10 MG/1
10 TABLET ORAL DAILY
Status: DISCONTINUED | OUTPATIENT
Start: 2018-01-01 | End: 2018-01-01

## 2018-01-01 RX ORDER — ACETAMINOPHEN 325 MG/1
325 TABLET ORAL EVERY 4 HOURS PRN
Status: DISCONTINUED | OUTPATIENT
Start: 2018-01-01 | End: 2018-01-01 | Stop reason: HOSPADM

## 2018-01-01 RX ORDER — ASPIRIN 81 MG
324 TABLET,CHEWABLE ORAL DAILY
Refills: 3 | COMMUNITY
Start: 2018-01-01

## 2018-01-01 RX ORDER — PIPERACILLIN SODIUM, TAZOBACTAM SODIUM 3; .375 G/15ML; G/15ML
3.38 INJECTION, POWDER, LYOPHILIZED, FOR SOLUTION INTRAVENOUS EVERY 6 HOURS
Status: DISCONTINUED | OUTPATIENT
Start: 2018-01-01 | End: 2018-01-01 | Stop reason: HOSPADM

## 2018-01-01 RX ORDER — HYDROMORPHONE HCL/0.9% NACL/PF 0.2MG/0.2
0.2 SYRINGE (ML) INTRAVENOUS ONCE
Status: COMPLETED | OUTPATIENT
Start: 2018-01-01 | End: 2018-01-01

## 2018-01-01 RX ORDER — LEVOFLOXACIN 25 MG/ML
500 SOLUTION ORAL DAILY
Qty: 140 ML | Refills: 0 | Status: SHIPPED | OUTPATIENT
Start: 2018-01-01 | End: 2018-01-01

## 2018-01-01 RX ORDER — METOPROLOL SUCCINATE 25 MG/1
25 TABLET, EXTENDED RELEASE ORAL AT BEDTIME
Status: DISCONTINUED | OUTPATIENT
Start: 2018-01-01 | End: 2018-01-01

## 2018-01-01 RX ORDER — WARFARIN SODIUM 1 MG/1
2 TABLET ORAL
Status: COMPLETED | OUTPATIENT
Start: 2018-01-01 | End: 2018-01-01

## 2018-01-01 RX ORDER — LEVETIRACETAM 100 MG/ML
750 SOLUTION ORAL 2 TIMES DAILY
Status: DISCONTINUED | OUTPATIENT
Start: 2018-01-01 | End: 2018-01-01

## 2018-01-01 RX ORDER — LOSARTAN POTASSIUM 25 MG/1
25 TABLET ORAL DAILY
Status: DISCONTINUED | OUTPATIENT
Start: 2018-01-01 | End: 2018-01-01

## 2018-01-01 RX ORDER — CEFPODOXIME PROXETIL 200 MG/1
200 TABLET, FILM COATED ORAL ONCE
Qty: 10 TABLET | Refills: 0 | Status: SHIPPED | OUTPATIENT
Start: 2018-01-01 | End: 2018-01-01

## 2018-01-01 RX ORDER — LINEZOLID 600 MG/1
600 TABLET, FILM COATED ORAL EVERY 12 HOURS
Status: DISCONTINUED | OUTPATIENT
Start: 2018-01-01 | End: 2018-01-01 | Stop reason: HOSPADM

## 2018-01-01 RX ORDER — DOXYCYCLINE 100 MG/1
100 CAPSULE ORAL ONCE
Status: COMPLETED | OUTPATIENT
Start: 2018-01-01 | End: 2018-01-01

## 2018-01-01 RX ORDER — PHENOL 1.4 %
10 AEROSOL, SPRAY (ML) MUCOUS MEMBRANE AT BEDTIME
Qty: 30 TABLET | Refills: 11 | Status: SHIPPED | OUTPATIENT
Start: 2018-01-01

## 2018-01-01 RX ORDER — ACETAMINOPHEN 325 MG/1
650 TABLET ORAL EVERY 6 HOURS PRN
Status: DISCONTINUED | OUTPATIENT
Start: 2018-01-01 | End: 2018-01-01

## 2018-01-01 RX ORDER — HALOPERIDOL 5 MG/ML
2 INJECTION INTRAMUSCULAR ONCE
Status: DISCONTINUED | OUTPATIENT
Start: 2018-01-01 | End: 2018-01-01

## 2018-01-01 RX ORDER — SODIUM CHLORIDE 9 MG/ML
INJECTION, SOLUTION INTRAVENOUS ONCE
Status: DISCONTINUED | OUTPATIENT
Start: 2018-01-01 | End: 2018-01-01 | Stop reason: CLARIF

## 2018-01-01 RX ORDER — ACETAMINOPHEN 325 MG/1
650 TABLET ORAL EVERY 6 HOURS PRN
Status: DISCONTINUED | OUTPATIENT
Start: 2018-01-01 | End: 2018-01-01 | Stop reason: HOSPADM

## 2018-01-01 RX ORDER — POTASSIUM CHLORIDE 750 MG/1
20-40 TABLET, EXTENDED RELEASE ORAL
Status: DISCONTINUED | OUTPATIENT
Start: 2018-01-01 | End: 2018-01-01 | Stop reason: HOSPADM

## 2018-01-01 RX ORDER — HYDROXYZINE HYDROCHLORIDE 10 MG/1
10 TABLET, FILM COATED ORAL ONCE
Status: COMPLETED | OUTPATIENT
Start: 2018-01-01 | End: 2018-01-01

## 2018-01-01 RX ORDER — SENNOSIDES 8.6 MG
8.6 TABLET ORAL 2 TIMES DAILY PRN
Status: DISCONTINUED | OUTPATIENT
Start: 2018-01-01 | End: 2018-01-01

## 2018-01-01 RX ORDER — PROCHLORPERAZINE 25 MG
12.5 SUPPOSITORY, RECTAL RECTAL EVERY 12 HOURS PRN
Status: DISCONTINUED | OUTPATIENT
Start: 2018-01-01 | End: 2018-01-01 | Stop reason: HOSPADM

## 2018-01-01 RX ORDER — LINEZOLID 2 MG/ML
600 INJECTION, SOLUTION INTRAVENOUS EVERY 12 HOURS
Status: DISCONTINUED | OUTPATIENT
Start: 2018-01-01 | End: 2018-01-01 | Stop reason: HOSPADM

## 2018-01-01 RX ORDER — SULFAMETHOXAZOLE AND TRIMETHOPRIM 200; 40 MG/5ML; MG/5ML
20 SUSPENSION ORAL 2 TIMES DAILY
Status: DISCONTINUED | OUTPATIENT
Start: 2018-01-01 | End: 2018-01-01

## 2018-01-01 RX ORDER — BISACODYL 10 MG
10 SUPPOSITORY, RECTAL RECTAL DAILY PRN
Status: DISCONTINUED | OUTPATIENT
Start: 2018-01-01 | End: 2018-01-01 | Stop reason: HOSPADM

## 2018-01-01 RX ORDER — AMINO ACIDS/PROTEIN HYDROLYS 11G-40/45
1 LIQUID IN PACKET (ML) ORAL 3 TIMES DAILY
Status: DISCONTINUED | OUTPATIENT
Start: 2018-01-01 | End: 2018-01-01 | Stop reason: HOSPADM

## 2018-01-01 RX ORDER — WARFARIN SODIUM 1 MG/1
2 TABLET ORAL
Status: DISCONTINUED | OUTPATIENT
Start: 2018-01-01 | End: 2018-01-01

## 2018-01-01 RX ORDER — LANOLIN ALCOHOL/MO/W.PET/CERES
100 CREAM (GRAM) TOPICAL DAILY
Status: DISCONTINUED | OUTPATIENT
Start: 2018-01-01 | End: 2018-01-01 | Stop reason: HOSPADM

## 2018-01-01 RX ORDER — WARFARIN SODIUM 1 MG/1
1 TABLET ORAL
Status: DISCONTINUED | OUTPATIENT
Start: 2018-01-01 | End: 2018-01-01 | Stop reason: HOSPADM

## 2018-01-01 RX ORDER — WARFARIN SODIUM 6 MG/1
6 TABLET ORAL ONCE
Status: COMPLETED | OUTPATIENT
Start: 2018-01-01 | End: 2018-01-01

## 2018-01-01 RX ORDER — IOPAMIDOL 755 MG/ML
75 INJECTION, SOLUTION INTRAVASCULAR ONCE
Status: COMPLETED | OUTPATIENT
Start: 2018-01-01 | End: 2018-01-01

## 2018-01-01 RX ORDER — CEFTRIAXONE 1 G/1
1 INJECTION, POWDER, FOR SOLUTION INTRAMUSCULAR; INTRAVENOUS EVERY 12 HOURS
Status: DISCONTINUED | OUTPATIENT
Start: 2018-01-01 | End: 2018-01-01

## 2018-01-01 RX ORDER — LOSARTAN POTASSIUM 25 MG/1
12.5 TABLET ORAL DAILY
Qty: 15 TABLET | Refills: 3 | Status: ON HOLD | OUTPATIENT
Start: 2018-01-01 | End: 2018-01-01

## 2018-01-01 RX ORDER — HEPARIN SODIUM,PORCINE 10 UNIT/ML
2-5 VIAL (ML) INTRAVENOUS
Status: DISCONTINUED | OUTPATIENT
Start: 2018-01-01 | End: 2018-01-01 | Stop reason: HOSPADM

## 2018-01-01 RX ORDER — WARFARIN SODIUM 5 MG/1
5 TABLET ORAL ONCE
Status: COMPLETED | OUTPATIENT
Start: 2018-01-01 | End: 2018-01-01

## 2018-01-01 RX ORDER — NICOTINE POLACRILEX 4 MG
15-30 LOZENGE BUCCAL
Status: DISCONTINUED | OUTPATIENT
Start: 2018-01-01 | End: 2018-01-01 | Stop reason: HOSPADM

## 2018-01-01 RX ORDER — CEPHALEXIN 250 MG/5ML
500 POWDER, FOR SUSPENSION ORAL 4 TIMES DAILY
Qty: 280 ML | Refills: 0 | Status: ON HOLD | OUTPATIENT
Start: 2018-01-01 | End: 2018-01-01

## 2018-01-01 RX ORDER — NYSTATIN 100000 [USP'U]/G
POWDER TOPICAL 3 TIMES DAILY PRN
Status: DISCONTINUED | OUTPATIENT
Start: 2018-01-01 | End: 2018-01-01

## 2018-01-01 RX ORDER — LEVOFLOXACIN 5 MG/ML
750 INJECTION, SOLUTION INTRAVENOUS
Status: DISCONTINUED | OUTPATIENT
Start: 2018-01-01 | End: 2018-01-01 | Stop reason: HOSPADM

## 2018-01-01 RX ORDER — ONDANSETRON 4 MG/1
4 TABLET, ORALLY DISINTEGRATING ORAL EVERY 6 HOURS PRN
Status: DISCONTINUED | OUTPATIENT
Start: 2018-01-01 | End: 2018-01-01 | Stop reason: HOSPADM

## 2018-01-01 RX ORDER — DEXTROSE MONOHYDRATE 50 MG/ML
INJECTION, SOLUTION INTRAVENOUS CONTINUOUS
Status: DISPENSED | OUTPATIENT
Start: 2018-01-01 | End: 2018-01-01

## 2018-01-01 RX ORDER — POTASSIUM CHLORIDE 7.45 MG/ML
10 INJECTION INTRAVENOUS
Status: DISCONTINUED | OUTPATIENT
Start: 2018-01-01 | End: 2018-01-01 | Stop reason: HOSPADM

## 2018-01-01 RX ORDER — OXYCODONE HYDROCHLORIDE 5 MG/1
5 TABLET ORAL EVERY 4 HOURS PRN
Qty: 60 TABLET | Refills: 0 | Status: ON HOLD | OUTPATIENT
Start: 2018-01-01 | End: 2018-01-01

## 2018-01-01 RX ORDER — WARFARIN SODIUM 4 MG/1
4 TABLET ORAL
Status: COMPLETED | OUTPATIENT
Start: 2018-01-01 | End: 2018-01-01

## 2018-01-01 RX ORDER — MULTIPLE VITAMINS W/ MINERALS TAB 9MG-400MCG
1 TAB ORAL DAILY
Status: DISCONTINUED | OUTPATIENT
Start: 2018-01-01 | End: 2018-01-01

## 2018-01-01 RX ORDER — NYSTATIN AND TRIAMCINOLONE ACETONIDE 100000; 1 [USP'U]/G; MG/G
1 CREAM TOPICAL 2 TIMES DAILY
Qty: 20 G | Refills: 0 | Status: ON HOLD | OUTPATIENT
Start: 2018-01-01 | End: 2018-01-01

## 2018-01-01 RX ORDER — MAGNESIUM SULFATE HEPTAHYDRATE 40 MG/ML
4 INJECTION, SOLUTION INTRAVENOUS EVERY 4 HOURS PRN
Status: DISCONTINUED | OUTPATIENT
Start: 2018-01-01 | End: 2018-01-01 | Stop reason: HOSPADM

## 2018-01-01 RX ORDER — HEPARIN SODIUM 10000 [USP'U]/100ML
0-3500 INJECTION, SOLUTION INTRAVENOUS CONTINUOUS
Status: DISCONTINUED | OUTPATIENT
Start: 2018-01-01 | End: 2018-01-01

## 2018-01-01 RX ORDER — WARFARIN SODIUM 1 MG/1
1 TABLET ORAL DAILY
Qty: 120 TABLET | Refills: 11 | Status: SHIPPED | OUTPATIENT
Start: 2018-01-01

## 2018-01-01 RX ORDER — LEVETIRACETAM 100 MG/ML
750 SOLUTION ORAL 2 TIMES DAILY
Status: DISCONTINUED | OUTPATIENT
Start: 2018-01-01 | End: 2018-01-01 | Stop reason: HOSPADM

## 2018-01-01 RX ORDER — CEPHALEXIN 500 MG/1
500 CAPSULE ORAL 2 TIMES DAILY
Qty: 20 CAPSULE | Refills: 0 | Status: SHIPPED | OUTPATIENT
Start: 2018-01-01 | End: 2018-01-01

## 2018-01-01 RX ORDER — LANOLIN ALCOHOL/MO/W.PET/CERES
100 CREAM (GRAM) TOPICAL DAILY
Qty: 90 TABLET | Refills: 3 | Status: SHIPPED | OUTPATIENT
Start: 2018-01-01

## 2018-01-01 RX ORDER — SULFAMETHOXAZOLE/TRIMETHOPRIM 800-160 MG
1 TABLET ORAL 2 TIMES DAILY
Status: DISCONTINUED | OUTPATIENT
Start: 2018-01-01 | End: 2018-01-01

## 2018-01-01 RX ORDER — ASPIRIN 325 MG
325 TABLET ORAL DAILY
Qty: 120 TABLET | Refills: 3 | Status: SHIPPED | OUTPATIENT
Start: 2018-01-01 | End: 2018-01-01 | Stop reason: ALTCHOICE

## 2018-01-01 RX ORDER — PROCHLORPERAZINE MALEATE 5 MG
5 TABLET ORAL EVERY 6 HOURS PRN
Status: DISCONTINUED | OUTPATIENT
Start: 2018-01-01 | End: 2018-01-01 | Stop reason: HOSPADM

## 2018-01-01 RX ORDER — LIDOCAINE 40 MG/G
CREAM TOPICAL
Status: DISCONTINUED | OUTPATIENT
Start: 2018-01-01 | End: 2018-01-01 | Stop reason: HOSPADM

## 2018-01-01 RX ORDER — GUAIFENESIN 600 MG/1
600 TABLET, EXTENDED RELEASE ORAL 2 TIMES DAILY PRN
Qty: 20 TABLET | Refills: 0 | Status: SHIPPED | OUTPATIENT
Start: 2018-01-01 | End: 2018-01-01

## 2018-01-01 RX ORDER — LORATADINE 10 MG/1
10 TABLET ORAL DAILY
Status: DISCONTINUED | OUTPATIENT
Start: 2018-01-01 | End: 2018-01-01 | Stop reason: HOSPADM

## 2018-01-01 RX ORDER — ONDANSETRON HYDROCHLORIDE 4 MG/5ML
4 SOLUTION ORAL 2 TIMES DAILY PRN
Qty: 90 ML | Refills: 0 | Status: SHIPPED | OUTPATIENT
Start: 2018-01-01

## 2018-01-01 RX ORDER — POTASSIUM CL/LIDO/0.9 % NACL 10MEQ/0.1L
10 INTRAVENOUS SOLUTION, PIGGYBACK (ML) INTRAVENOUS
Status: DISCONTINUED | OUTPATIENT
Start: 2018-01-01 | End: 2018-01-01 | Stop reason: RX

## 2018-01-01 RX ORDER — METRONIDAZOLE 500 MG/1
500 TABLET ORAL EVERY 8 HOURS SCHEDULED
Status: DISCONTINUED | OUTPATIENT
Start: 2018-01-01 | End: 2018-01-01

## 2018-01-01 RX ORDER — TAMSULOSIN HYDROCHLORIDE 0.4 MG/1
0.8 CAPSULE ORAL DAILY
Qty: 60 CAPSULE | Refills: 11 | Status: SHIPPED | OUTPATIENT
Start: 2018-01-01 | End: 2018-01-01

## 2018-01-01 RX ORDER — LEVETIRACETAM 750 MG/1
750 TABLET ORAL 2 TIMES DAILY
Status: DISCONTINUED | OUTPATIENT
Start: 2018-01-01 | End: 2018-01-01

## 2018-01-01 RX ORDER — LEVOFLOXACIN 750 MG/1
750 TABLET, FILM COATED ORAL ONCE
Status: DISCONTINUED | OUTPATIENT
Start: 2018-01-01 | End: 2018-01-01 | Stop reason: HOSPADM

## 2018-01-01 RX ORDER — ACETAMINOPHEN 650 MG/1
650 SUPPOSITORY RECTAL EVERY 4 HOURS PRN
Status: DISCONTINUED | OUTPATIENT
Start: 2018-01-01 | End: 2018-01-01 | Stop reason: HOSPADM

## 2018-01-01 RX ORDER — BISACODYL 10 MG
10 SUPPOSITORY, RECTAL RECTAL ONCE
Status: COMPLETED | OUTPATIENT
Start: 2018-01-01 | End: 2018-01-01

## 2018-01-01 RX ORDER — WARFARIN SODIUM 3 MG/1
3 TABLET ORAL ONCE
Status: COMPLETED | OUTPATIENT
Start: 2018-01-01 | End: 2018-01-01

## 2018-01-01 RX ORDER — ASPIRIN 81 MG/1
324 TABLET, CHEWABLE ORAL ONCE
Status: COMPLETED | OUTPATIENT
Start: 2018-01-01 | End: 2018-01-01

## 2018-01-01 RX ORDER — MAGNESIUM SULFATE HEPTAHYDRATE 40 MG/ML
2 INJECTION, SOLUTION INTRAVENOUS DAILY PRN
Status: DISCONTINUED | OUTPATIENT
Start: 2018-01-01 | End: 2018-01-01 | Stop reason: HOSPADM

## 2018-01-01 RX ORDER — WARFARIN SODIUM 5 MG/1
5 TABLET ORAL
Status: COMPLETED | OUTPATIENT
Start: 2018-01-01 | End: 2018-01-01

## 2018-01-01 RX ORDER — WARFARIN SODIUM 2.5 MG/1
2.5 TABLET ORAL
Status: COMPLETED | OUTPATIENT
Start: 2018-01-01 | End: 2018-01-01

## 2018-01-01 RX ORDER — IODINE/SODIUM IODIDE 2 %
TINCTURE TOPICAL
Status: DISCONTINUED | OUTPATIENT
Start: 2018-01-01 | End: 2018-01-01 | Stop reason: HOSPADM

## 2018-01-01 RX ORDER — METOPROLOL SUCCINATE 25 MG/1
25 TABLET, EXTENDED RELEASE ORAL DAILY
Status: DISCONTINUED | OUTPATIENT
Start: 2018-01-01 | End: 2018-01-01

## 2018-01-01 RX ORDER — CEPHALEXIN 250 MG/5ML
500 POWDER, FOR SUSPENSION ORAL ONCE
Status: COMPLETED | OUTPATIENT
Start: 2018-01-01 | End: 2018-01-01

## 2018-01-01 RX ORDER — AMINO ACIDS/PROTEIN HYDROLYS 11G-40/45
1 LIQUID IN PACKET (ML) ORAL DAILY
Status: DISCONTINUED | OUTPATIENT
Start: 2018-01-01 | End: 2018-01-01 | Stop reason: HOSPADM

## 2018-01-01 RX ORDER — WARFARIN SODIUM 3 MG/1
3 TABLET ORAL
Status: DISCONTINUED | OUTPATIENT
Start: 2018-01-01 | End: 2018-01-01

## 2018-01-01 RX ORDER — METOPROLOL TARTRATE 1 MG/ML
5 INJECTION, SOLUTION INTRAVENOUS ONCE
Status: DISCONTINUED | OUTPATIENT
Start: 2018-01-01 | End: 2018-01-01

## 2018-01-01 RX ORDER — AMOXICILLIN 250 MG
2 CAPSULE ORAL 2 TIMES DAILY PRN
Qty: 60 TABLET | Refills: 1 | Status: ON HOLD | OUTPATIENT
Start: 2018-01-01 | End: 2018-01-01

## 2018-01-01 RX ORDER — LINEZOLID 600 MG/1
600 TABLET, FILM COATED ORAL 2 TIMES DAILY
Qty: 9 TABLET | Refills: 0 | Status: SHIPPED | OUTPATIENT
Start: 2018-01-01 | End: 2018-01-01

## 2018-01-01 RX ORDER — DEXTROSE MONOHYDRATE 50 MG/ML
INJECTION, SOLUTION INTRAVENOUS CONTINUOUS
Status: DISCONTINUED | OUTPATIENT
Start: 2018-01-01 | End: 2018-01-01

## 2018-01-01 RX ORDER — AMOXICILLIN 250 MG
1 CAPSULE ORAL 2 TIMES DAILY
Status: DISCONTINUED | OUTPATIENT
Start: 2018-01-01 | End: 2018-01-01 | Stop reason: HOSPADM

## 2018-01-01 RX ORDER — METOPROLOL SUCCINATE 50 MG/1
50 TABLET, EXTENDED RELEASE ORAL DAILY
Status: DISCONTINUED | OUTPATIENT
Start: 2018-01-01 | End: 2018-01-01 | Stop reason: HOSPADM

## 2018-01-01 RX ORDER — LANOLIN ALCOHOL/MO/W.PET/CERES
100 CREAM (GRAM) TOPICAL DAILY
Status: DISCONTINUED | OUTPATIENT
Start: 2018-01-01 | End: 2018-01-01

## 2018-01-01 RX ORDER — WARFARIN SODIUM 5 MG/1
5 TABLET ORAL
Status: DISCONTINUED | OUTPATIENT
Start: 2018-01-01 | End: 2018-01-01

## 2018-01-01 RX ORDER — WARFARIN SODIUM 3 MG/1
3 TABLET ORAL DAILY
Status: DISCONTINUED | OUTPATIENT
Start: 2018-01-01 | End: 2018-01-01

## 2018-01-01 RX ORDER — LEVETIRACETAM 100 MG/ML
750 SOLUTION ORAL EVERY 12 HOURS
Status: DISCONTINUED | OUTPATIENT
Start: 2018-01-01 | End: 2018-01-01 | Stop reason: HOSPADM

## 2018-01-01 RX ORDER — WARFARIN SODIUM 1 MG/1
3 TABLET ORAL DAILY
Qty: 120 TABLET | Refills: 11 | Status: ON HOLD | OUTPATIENT
Start: 2018-01-01 | End: 2018-01-01

## 2018-01-01 RX ORDER — WARFARIN SODIUM 1 MG/1
3 TABLET ORAL DAILY
Qty: 30 TABLET | Refills: 1 | Status: SHIPPED | OUTPATIENT
Start: 2018-01-01 | End: 2018-01-01

## 2018-01-01 RX ORDER — LEVETIRACETAM 750 MG/1
750 TABLET ORAL 2 TIMES DAILY
Status: DISCONTINUED | OUTPATIENT
Start: 2018-01-01 | End: 2018-01-01 | Stop reason: DRUGHIGH

## 2018-01-01 RX ORDER — MODAFINIL 100 MG/1
100 TABLET ORAL DAILY
Status: DISCONTINUED | OUTPATIENT
Start: 2018-01-01 | End: 2018-01-01 | Stop reason: HOSPADM

## 2018-01-01 RX ORDER — WARFARIN SODIUM 1 MG/1
1 TABLET ORAL ONCE
Status: COMPLETED | OUTPATIENT
Start: 2018-01-01 | End: 2018-01-01

## 2018-01-01 RX ORDER — SODIUM CHLORIDE 9 MG/ML
1000 INJECTION, SOLUTION INTRAVENOUS CONTINUOUS
Status: DISCONTINUED | OUTPATIENT
Start: 2018-01-01 | End: 2018-01-01

## 2018-01-01 RX ORDER — TAMSULOSIN HYDROCHLORIDE 0.4 MG/1
0.4 CAPSULE ORAL DAILY
Status: DISCONTINUED | OUTPATIENT
Start: 2018-01-01 | End: 2018-01-01 | Stop reason: HOSPADM

## 2018-01-01 RX ORDER — POTASSIUM CHLORIDE 29.8 MG/ML
20 INJECTION INTRAVENOUS
Status: DISCONTINUED | OUTPATIENT
Start: 2018-01-01 | End: 2018-01-01

## 2018-01-01 RX ORDER — SIMETHICONE 80 MG
80 TABLET,CHEWABLE ORAL 4 TIMES DAILY PRN
Status: DISCONTINUED | OUTPATIENT
Start: 2018-01-01 | End: 2018-01-01 | Stop reason: HOSPADM

## 2018-01-01 RX ORDER — AMOXICILLIN 250 MG
1-2 CAPSULE ORAL 2 TIMES DAILY PRN
COMMUNITY

## 2018-01-01 RX ORDER — ASPIRIN 81 MG/1
81 TABLET, CHEWABLE ORAL DAILY
Status: DISCONTINUED | OUTPATIENT
Start: 2018-01-01 | End: 2018-01-01

## 2018-01-01 RX ORDER — ONDANSETRON 2 MG/ML
4 INJECTION INTRAMUSCULAR; INTRAVENOUS EVERY 30 MIN PRN
Status: DISCONTINUED | OUTPATIENT
Start: 2018-01-01 | End: 2018-01-01 | Stop reason: HOSPADM

## 2018-01-01 RX ORDER — DOXAZOSIN 2 MG/1
2 TABLET ORAL DAILY
Status: DISCONTINUED | OUTPATIENT
Start: 2018-01-01 | End: 2018-01-01

## 2018-01-01 RX ORDER — LOSARTAN POTASSIUM 25 MG/1
25 TABLET ORAL DAILY
Qty: 15 TABLET | Refills: 3 | Status: ON HOLD | COMMUNITY
Start: 2018-01-01 | End: 2018-01-01

## 2018-01-01 RX ORDER — SIMETHICONE 80 MG
80 TABLET,CHEWABLE ORAL EVERY 6 HOURS PRN
COMMUNITY

## 2018-01-01 RX ORDER — HALOPERIDOL 5 MG/ML
2 INJECTION INTRAMUSCULAR ONCE
Status: COMPLETED | OUTPATIENT
Start: 2018-01-01 | End: 2018-01-01

## 2018-01-01 RX ORDER — LEVETIRACETAM 750 MG/1
750 TABLET ORAL ONCE
Status: COMPLETED | OUTPATIENT
Start: 2018-01-01 | End: 2018-01-01

## 2018-01-01 RX ORDER — LEVETIRACETAM 100 MG/ML
750 SOLUTION ORAL EVERY 12 HOURS
Qty: 500 ML | Refills: 3 | Status: SHIPPED | OUTPATIENT
Start: 2018-01-01

## 2018-01-01 RX ORDER — LINEZOLID 600 MG/1
600 TABLET, FILM COATED ORAL EVERY 12 HOURS SCHEDULED
Status: COMPLETED | OUTPATIENT
Start: 2018-01-01 | End: 2018-01-01

## 2018-01-01 RX ORDER — GUAIFENESIN 600 MG/1
600 TABLET, EXTENDED RELEASE ORAL 2 TIMES DAILY PRN
Status: DISCONTINUED | OUTPATIENT
Start: 2018-01-01 | End: 2018-01-01

## 2018-01-01 RX ORDER — MODAFINIL 100 MG/1
100 TABLET ORAL DAILY
Status: DISCONTINUED | OUTPATIENT
Start: 2018-01-01 | End: 2018-01-01

## 2018-01-01 RX ORDER — LINEZOLID 600 MG/1
600 TABLET, FILM COATED ORAL EVERY 12 HOURS
Qty: 20 TABLET | Refills: 0 | Status: SHIPPED | OUTPATIENT
Start: 2018-01-01 | End: 2018-07-22

## 2018-01-01 RX ORDER — ASPIRIN 81 MG/1
81 TABLET ORAL DAILY
Status: DISCONTINUED | OUTPATIENT
Start: 2018-01-01 | End: 2018-01-01 | Stop reason: HOSPADM

## 2018-01-01 RX ORDER — METRONIDAZOLE 250 MG/1
250 TABLET ORAL EVERY 8 HOURS SCHEDULED
Status: DISCONTINUED | OUTPATIENT
Start: 2018-01-01 | End: 2018-01-01

## 2018-01-01 RX ORDER — LINEZOLID 600 MG/1
600 TABLET, FILM COATED ORAL EVERY 12 HOURS SCHEDULED
Status: DISCONTINUED | OUTPATIENT
Start: 2018-01-01 | End: 2018-01-01

## 2018-01-01 RX ORDER — LINEZOLID 600 MG/1
600 TABLET, FILM COATED ORAL EVERY 12 HOURS
Qty: 6 TABLET | Refills: 0 | Status: SHIPPED | OUTPATIENT
Start: 2018-01-01 | End: 2018-01-01

## 2018-01-01 RX ORDER — HYDRALAZINE HYDROCHLORIDE 25 MG/1
25 TABLET, FILM COATED ORAL EVERY 8 HOURS SCHEDULED
Status: DISCONTINUED | OUTPATIENT
Start: 2018-01-01 | End: 2018-01-01

## 2018-01-01 RX ADMIN — Medication 0.5 MG: at 17:22

## 2018-01-01 RX ADMIN — MODAFINIL 100 MG: 100 TABLET ORAL at 09:11

## 2018-01-01 RX ADMIN — MULTIPLE VITAMINS W/ MINERALS TAB 1 TABLET: TAB at 08:53

## 2018-01-01 RX ADMIN — WARFARIN SODIUM 3.5 MG: 2.5 TABLET ORAL at 17:48

## 2018-01-01 RX ADMIN — HUMAN INSULIN 8 UNITS/HR: 100 INJECTION, SOLUTION SUBCUTANEOUS at 04:59

## 2018-01-01 RX ADMIN — LEVETIRACETAM 750 MG: 100 SOLUTION ORAL at 20:44

## 2018-01-01 RX ADMIN — HUMAN INSULIN 6 UNITS/HR: 100 INJECTION, SOLUTION SUBCUTANEOUS at 09:29

## 2018-01-01 RX ADMIN — LEVETIRACETAM 750 MG: 100 SOLUTION ORAL at 09:42

## 2018-01-01 RX ADMIN — Medication 10 MG: at 21:59

## 2018-01-01 RX ADMIN — HUMAN INSULIN 6 UNITS/HR: 100 INJECTION, SOLUTION SUBCUTANEOUS at 01:52

## 2018-01-01 RX ADMIN — Medication 1 PACKET: at 14:06

## 2018-01-01 RX ADMIN — ASPIRIN 81 MG CHEWABLE TABLET 81 MG: 81 TABLET CHEWABLE at 07:36

## 2018-01-01 RX ADMIN — LEVETIRACETAM 750 MG: 100 SOLUTION ORAL at 10:03

## 2018-01-01 RX ADMIN — WARFARIN SODIUM 2 MG: 2 TABLET ORAL at 17:43

## 2018-01-01 RX ADMIN — LOSARTAN POTASSIUM 12.5 MG: 100 TABLET, FILM COATED ORAL at 09:24

## 2018-01-01 RX ADMIN — MODAFINIL 100 MG: 100 TABLET ORAL at 08:45

## 2018-01-01 RX ADMIN — MULTIVITAMIN 15 ML: LIQUID ORAL at 08:09

## 2018-01-01 RX ADMIN — Medication 100 MG: at 08:05

## 2018-01-01 RX ADMIN — Medication 10 MG: at 22:09

## 2018-01-01 RX ADMIN — LEVETIRACETAM 750 MG: 100 SOLUTION ORAL at 08:57

## 2018-01-01 RX ADMIN — Medication 12.5 MG: at 15:23

## 2018-01-01 RX ADMIN — PANTOPRAZOLE SODIUM 20 MG: 20 TABLET, DELAYED RELEASE ORAL at 16:42

## 2018-01-01 RX ADMIN — ACETAMINOPHEN 650 MG: 325 SOLUTION ORAL at 09:25

## 2018-01-01 RX ADMIN — LORATADINE 10 MG: 5 SOLUTION ORAL at 09:15

## 2018-01-01 RX ADMIN — Medication 1 PACKET: at 09:02

## 2018-01-01 RX ADMIN — Medication 10 MG: at 21:09

## 2018-01-01 RX ADMIN — KETOTIFEN FUMARATE 1 DROP: 0.35 SOLUTION/ DROPS OPHTHALMIC at 22:10

## 2018-01-01 RX ADMIN — DOXAZOSIN MESYLATE 2 MG: 2 TABLET ORAL at 09:25

## 2018-01-01 RX ADMIN — Medication 1 PACKET: at 14:52

## 2018-01-01 RX ADMIN — Medication 1 PACKET: at 09:09

## 2018-01-01 RX ADMIN — DOXYCYCLINE HYCLATE 100 MG: 100 CAPSULE ORAL at 20:37

## 2018-01-01 RX ADMIN — LEVETIRACETAM 750 MG: 100 SOLUTION ORAL at 09:00

## 2018-01-01 RX ADMIN — TAMSULOSIN HYDROCHLORIDE 0.8 MG: 0.4 CAPSULE ORAL at 09:31

## 2018-01-01 RX ADMIN — LEVETIRACETAM 750 MG: 100 SOLUTION ORAL at 20:41

## 2018-01-01 RX ADMIN — WARFARIN SODIUM 3.5 MG: 2.5 TABLET ORAL at 18:34

## 2018-01-01 RX ADMIN — INSULIN ASPART 3 UNITS: 100 INJECTION, SOLUTION INTRAVENOUS; SUBCUTANEOUS at 09:02

## 2018-01-01 RX ADMIN — Medication 1 PACKET: at 09:26

## 2018-01-01 RX ADMIN — LORATADINE 10 MG: 10 TABLET ORAL at 08:07

## 2018-01-01 RX ADMIN — Medication 10 MG: at 21:04

## 2018-01-01 RX ADMIN — PIPERACILLIN SODIUM AND TAZOBACTAM SODIUM 3.38 G: 3; .375 INJECTION, POWDER, LYOPHILIZED, FOR SOLUTION INTRAVENOUS at 18:13

## 2018-01-01 RX ADMIN — Medication 100 MG: at 08:54

## 2018-01-01 RX ADMIN — LINEZOLID 600 MG: 600 INJECTION, SOLUTION INTRAVENOUS at 13:00

## 2018-01-01 RX ADMIN — PANTOPRAZOLE SODIUM 20 MG: 40 TABLET, DELAYED RELEASE ORAL at 23:02

## 2018-01-01 RX ADMIN — METOPROLOL SUCCINATE 25 MG: 25 TABLET, EXTENDED RELEASE ORAL at 21:13

## 2018-01-01 RX ADMIN — Medication 2 G: at 08:01

## 2018-01-01 RX ADMIN — MULTIPLE VITAMINS W/ MINERALS TAB 1 TABLET: TAB at 09:15

## 2018-01-01 RX ADMIN — TAMSULOSIN HYDROCHLORIDE 0.8 MG: 0.4 CAPSULE ORAL at 09:57

## 2018-01-01 RX ADMIN — SIMETHICONE CHEW TAB 80 MG 80 MG: 80 TABLET ORAL at 08:55

## 2018-01-01 RX ADMIN — Medication 1 PACKET: at 08:12

## 2018-01-01 RX ADMIN — LORATADINE 10 MG: 10 TABLET ORAL at 09:23

## 2018-01-01 RX ADMIN — SIMETHICONE CHEW TAB 80 MG 80 MG: 80 TABLET ORAL at 20:34

## 2018-01-01 RX ADMIN — POTASSIUM & SODIUM PHOSPHATES POWDER PACK 280-160-250 MG 1 PACKET: 280-160-250 PACK at 16:03

## 2018-01-01 RX ADMIN — Medication 1 PACKET: at 16:26

## 2018-01-01 RX ADMIN — LORATADINE 10 MG: 5 SOLUTION ORAL at 08:10

## 2018-01-01 RX ADMIN — FINASTERIDE 5 MG: 5 TABLET, FILM COATED ORAL at 09:22

## 2018-01-01 RX ADMIN — LEVETIRACETAM 750 MG: 750 TABLET, FILM COATED ORAL at 08:53

## 2018-01-01 RX ADMIN — Medication 100 MG: at 10:35

## 2018-01-01 RX ADMIN — MODAFINIL 100 MG: 100 TABLET ORAL at 09:15

## 2018-01-01 RX ADMIN — MULTIVITAMIN 15 ML: LIQUID ORAL at 09:00

## 2018-01-01 RX ADMIN — LEVETIRACETAM 750 MG: 250 TABLET, FILM COATED ORAL at 08:44

## 2018-01-01 RX ADMIN — ASPIRIN 325 MG ORAL TABLET 325 MG: 325 PILL ORAL at 15:57

## 2018-01-01 RX ADMIN — METOPROLOL SUCCINATE 25 MG: 25 TABLET, EXTENDED RELEASE ORAL at 21:42

## 2018-01-01 RX ADMIN — LEVETIRACETAM 750 MG: 750 TABLET, FILM COATED ORAL at 21:02

## 2018-01-01 RX ADMIN — ACETAMINOPHEN 650 MG: 325 TABLET, FILM COATED ORAL at 23:13

## 2018-01-01 RX ADMIN — INSULIN HUMAN 88 UNITS: 100 INJECTION, SUSPENSION SUBCUTANEOUS at 18:02

## 2018-01-01 RX ADMIN — POTASSIUM & SODIUM PHOSPHATES POWDER PACK 280-160-250 MG 1 PACKET: 280-160-250 PACK at 19:42

## 2018-01-01 RX ADMIN — LINEZOLID 600 MG: 600 INJECTION, SOLUTION INTRAVENOUS at 22:37

## 2018-01-01 RX ADMIN — HEPARIN SODIUM 950 UNITS/HR: 10000 INJECTION, SOLUTION INTRAVENOUS at 11:55

## 2018-01-01 RX ADMIN — MODAFINIL 100 MG: 100 TABLET ORAL at 12:37

## 2018-01-01 RX ADMIN — Medication 1 PACKET: at 13:07

## 2018-01-01 RX ADMIN — SIMETHICONE CHEW TAB 80 MG 80 MG: 80 TABLET ORAL at 16:42

## 2018-01-01 RX ADMIN — ONDANSETRON 4 MG: 2 INJECTION INTRAMUSCULAR; INTRAVENOUS at 01:18

## 2018-01-01 RX ADMIN — Medication 81 MG: at 09:15

## 2018-01-01 RX ADMIN — INSULIN HUMAN 88 UNITS: 100 INJECTION, SUSPENSION SUBCUTANEOUS at 18:30

## 2018-01-01 RX ADMIN — FINASTERIDE 5 MG: 5 TABLET, FILM COATED ORAL at 08:56

## 2018-01-01 RX ADMIN — HUMAN INSULIN 8 UNITS/HR: 100 INJECTION, SOLUTION SUBCUTANEOUS at 19:39

## 2018-01-01 RX ADMIN — METOPROLOL SUCCINATE 50 MG: 50 TABLET, EXTENDED RELEASE ORAL at 08:07

## 2018-01-01 RX ADMIN — HUMAN INSULIN 10 UNITS/HR: 100 INJECTION, SOLUTION SUBCUTANEOUS at 22:26

## 2018-01-01 RX ADMIN — SENNOSIDES AND DOCUSATE SODIUM 2 TABLET: 8.6; 5 TABLET ORAL at 09:49

## 2018-01-01 RX ADMIN — FINASTERIDE 5 MG: 5 TABLET, FILM COATED ORAL at 18:07

## 2018-01-01 RX ADMIN — Medication 80 MG: at 10:44

## 2018-01-01 RX ADMIN — ACETAMINOPHEN 650 MG: 325 SOLUTION ORAL at 12:50

## 2018-01-01 RX ADMIN — LORATADINE 10 MG: 10 TABLET ORAL at 10:34

## 2018-01-01 RX ADMIN — LEVETIRACETAM 750 MG: 100 SOLUTION ORAL at 09:04

## 2018-01-01 RX ADMIN — PANTOPRAZOLE SODIUM 20 MG: 20 TABLET, DELAYED RELEASE ORAL at 08:43

## 2018-01-01 RX ADMIN — LINEZOLID 600 MG: 600 INJECTION, SOLUTION INTRAVENOUS at 11:42

## 2018-01-01 RX ADMIN — ACETAMINOPHEN 650 MG: 325 SOLUTION ORAL at 14:24

## 2018-01-01 RX ADMIN — KETOTIFEN FUMARATE 1 DROP: 0.35 SOLUTION/ DROPS OPHTHALMIC at 09:38

## 2018-01-01 RX ADMIN — TAMSULOSIN HYDROCHLORIDE 0.8 MG: 0.4 CAPSULE ORAL at 08:53

## 2018-01-01 RX ADMIN — FERRIC OXIDE RED: 8; 8 LOTION TOPICAL at 16:56

## 2018-01-01 RX ADMIN — FINASTERIDE 5 MG: 5 TABLET, FILM COATED ORAL at 08:46

## 2018-01-01 RX ADMIN — SIMETHICONE CHEW TAB 80 MG 80 MG: 80 TABLET ORAL at 09:35

## 2018-01-01 RX ADMIN — INSULIN ASPART 1 UNITS: 100 INJECTION, SOLUTION INTRAVENOUS; SUBCUTANEOUS at 14:06

## 2018-01-01 RX ADMIN — Medication 100 MG: at 09:32

## 2018-01-01 RX ADMIN — ACETAMINOPHEN 650 MG: 325 SOLUTION ORAL at 06:25

## 2018-01-01 RX ADMIN — ACETAMINOPHEN 650 MG: 325 TABLET, FILM COATED ORAL at 00:57

## 2018-01-01 RX ADMIN — HEPARIN SODIUM 750 UNITS/HR: 10000 INJECTION, SOLUTION INTRAVENOUS at 17:27

## 2018-01-01 RX ADMIN — Medication 100 MG: at 09:19

## 2018-01-01 RX ADMIN — ACETAMINOPHEN 650 MG: 325 SOLUTION ORAL at 04:52

## 2018-01-01 RX ADMIN — LEVETIRACETAM 750 MG: 100 INJECTION, SOLUTION INTRAVENOUS at 08:40

## 2018-01-01 RX ADMIN — HUMAN INSULIN 9 UNITS/HR: 100 INJECTION, SOLUTION SUBCUTANEOUS at 06:00

## 2018-01-01 RX ADMIN — POTASSIUM CHLORIDE 20 MEQ: 1.5 POWDER, FOR SOLUTION ORAL at 09:10

## 2018-01-01 RX ADMIN — HYDROXYZINE HYDROCHLORIDE 10 MG: 10 TABLET ORAL at 01:03

## 2018-01-01 RX ADMIN — SENNOSIDES AND DOCUSATE SODIUM 2 TABLET: 8.6; 5 TABLET ORAL at 10:54

## 2018-01-01 RX ADMIN — Medication 1 TABLET: at 09:11

## 2018-01-01 RX ADMIN — CEFEPIME HYDROCHLORIDE 2 G: 2 INJECTION, POWDER, FOR SOLUTION INTRAVENOUS at 16:15

## 2018-01-01 RX ADMIN — Medication 5 MG: at 23:10

## 2018-01-01 RX ADMIN — TAMSULOSIN HYDROCHLORIDE 0.8 MG: 0.4 CAPSULE ORAL at 08:06

## 2018-01-01 RX ADMIN — Medication 1 PACKET: at 15:44

## 2018-01-01 RX ADMIN — INSULIN ASPART 2 UNITS: 100 INJECTION, SOLUTION INTRAVENOUS; SUBCUTANEOUS at 03:20

## 2018-01-01 RX ADMIN — FINASTERIDE 5 MG: 5 TABLET, FILM COATED ORAL at 08:59

## 2018-01-01 RX ADMIN — PANTOPRAZOLE SODIUM 20 MG: 20 TABLET, DELAYED RELEASE ORAL at 08:39

## 2018-01-01 RX ADMIN — LEVETIRACETAM 750 MG: 250 TABLET, FILM COATED ORAL at 00:48

## 2018-01-01 RX ADMIN — WARFARIN SODIUM 7 MG: 6 TABLET ORAL at 18:01

## 2018-01-01 RX ADMIN — LINEZOLID 600 MG: 600 INJECTION, SOLUTION INTRAVENOUS at 12:13

## 2018-01-01 RX ADMIN — INSULIN HUMAN 20 UNITS: 100 INJECTION, SUSPENSION SUBCUTANEOUS at 09:09

## 2018-01-01 RX ADMIN — ASPIRIN 81 MG CHEWABLE TABLET 81 MG: 81 TABLET CHEWABLE at 09:41

## 2018-01-01 RX ADMIN — Medication 81 MG: at 09:10

## 2018-01-01 RX ADMIN — SULFAMETHOXAZOLE AND TRIMETHOPRIM 160 MG: 200; 40 SUSPENSION ORAL at 09:01

## 2018-01-01 RX ADMIN — TAMSULOSIN HYDROCHLORIDE 0.8 MG: 0.4 CAPSULE ORAL at 09:21

## 2018-01-01 RX ADMIN — PANTOPRAZOLE SODIUM 20 MG: 40 TABLET, DELAYED RELEASE ORAL at 20:02

## 2018-01-01 RX ADMIN — LOSARTAN POTASSIUM 12.5 MG: 100 TABLET, FILM COATED ORAL at 09:03

## 2018-01-01 RX ADMIN — HALOPERIDOL LACTATE 2 MG: 5 INJECTION, SOLUTION INTRAMUSCULAR at 22:19

## 2018-01-01 RX ADMIN — KETOTIFEN FUMARATE 1 DROP: 0.35 SOLUTION/ DROPS OPHTHALMIC at 08:39

## 2018-01-01 RX ADMIN — FINASTERIDE 5 MG: 5 TABLET, FILM COATED ORAL at 09:03

## 2018-01-01 RX ADMIN — ASPIRIN 325 MG ORAL TABLET 325 MG: 325 PILL ORAL at 16:57

## 2018-01-01 RX ADMIN — LEVETIRACETAM 750 MG: 750 TABLET, FILM COATED ORAL at 09:56

## 2018-01-01 RX ADMIN — LEVETIRACETAM 750 MG: 100 SOLUTION ORAL at 20:34

## 2018-01-01 RX ADMIN — WARFARIN SODIUM 2.5 MG: 2.5 TABLET ORAL at 18:33

## 2018-01-01 RX ADMIN — Medication 100 MG: at 08:24

## 2018-01-01 RX ADMIN — KETOTIFEN FUMARATE 1 DROP: 0.35 SOLUTION/ DROPS OPHTHALMIC at 09:22

## 2018-01-01 RX ADMIN — SENNOSIDES AND DOCUSATE SODIUM 2 TABLET: 8.6; 5 TABLET ORAL at 20:33

## 2018-01-01 RX ADMIN — LINEZOLID 600 MG: 600 TABLET, FILM COATED ORAL at 12:42

## 2018-01-01 RX ADMIN — PANTOPRAZOLE SODIUM 20 MG: 40 TABLET, DELAYED RELEASE ORAL at 09:04

## 2018-01-01 RX ADMIN — Medication 1 PACKET: at 10:07

## 2018-01-01 RX ADMIN — OLANZAPINE 5 MG: 5 TABLET, ORALLY DISINTEGRATING ORAL at 03:12

## 2018-01-01 RX ADMIN — SODIUM CHLORIDE 500 ML: 9 INJECTION, SOLUTION INTRAVENOUS at 17:52

## 2018-01-01 RX ADMIN — METOPROLOL SUCCINATE 25 MG: 25 TABLET, EXTENDED RELEASE ORAL at 00:20

## 2018-01-01 RX ADMIN — LOSARTAN POTASSIUM 25 MG: 25 TABLET ORAL at 09:31

## 2018-01-01 RX ADMIN — MODAFINIL 100 MG: 100 TABLET ORAL at 08:11

## 2018-01-01 RX ADMIN — HEPARIN SODIUM 1300 UNITS/HR: 10000 INJECTION, SOLUTION INTRAVENOUS at 06:06

## 2018-01-01 RX ADMIN — MICONAZOLE NITRATE 1 APPLICATOR: 2 POWDER TOPICAL at 10:36

## 2018-01-01 RX ADMIN — Medication 10 MG: at 22:13

## 2018-01-01 RX ADMIN — ACETAMINOPHEN 650 MG: 325 SOLUTION ORAL at 09:24

## 2018-01-01 RX ADMIN — METRONIDAZOLE 500 MG: 500 TABLET ORAL at 23:39

## 2018-01-01 RX ADMIN — MODAFINIL 100 MG: 100 TABLET ORAL at 09:01

## 2018-01-01 RX ADMIN — ONDANSETRON 4 MG: 2 INJECTION INTRAMUSCULAR; INTRAVENOUS at 09:01

## 2018-01-01 RX ADMIN — FERRIC OXIDE RED: 8; 8 LOTION TOPICAL at 20:26

## 2018-01-01 RX ADMIN — Medication 10 MG: at 23:29

## 2018-01-01 RX ADMIN — HEPARIN SODIUM 1450 UNITS/HR: 10000 INJECTION, SOLUTION INTRAVENOUS at 11:42

## 2018-01-01 RX ADMIN — Medication 2 MG: at 09:28

## 2018-01-01 RX ADMIN — SENNOSIDES AND DOCUSATE SODIUM 1 TABLET: 8.6; 5 TABLET ORAL at 20:34

## 2018-01-01 RX ADMIN — SODIUM CHLORIDE, POTASSIUM CHLORIDE, SODIUM LACTATE AND CALCIUM CHLORIDE 250 ML: 600; 310; 30; 20 INJECTION, SOLUTION INTRAVENOUS at 19:20

## 2018-01-01 RX ADMIN — LEVETIRACETAM 750 MG: 100 SOLUTION ORAL at 09:07

## 2018-01-01 RX ADMIN — Medication 100 MG: at 09:22

## 2018-01-01 RX ADMIN — LINEZOLID 600 MG: 600 TABLET, FILM COATED ORAL at 20:34

## 2018-01-01 RX ADMIN — SIMETHICONE CHEW TAB 80 MG 80 MG: 80 TABLET ORAL at 09:31

## 2018-01-01 RX ADMIN — LEVETIRACETAM 750 MG: 100 SOLUTION ORAL at 16:58

## 2018-01-01 RX ADMIN — METRONIDAZOLE 500 MG: 500 TABLET ORAL at 23:58

## 2018-01-01 RX ADMIN — POTASSIUM PHOSPHATE, MONOBASIC AND POTASSIUM PHOSPHATE, DIBASIC 10 MMOL: 224; 236 INJECTION, SOLUTION INTRAVENOUS at 17:39

## 2018-01-01 RX ADMIN — POTASSIUM PHOSPHATE, MONOBASIC AND POTASSIUM PHOSPHATE, DIBASIC 20 MMOL: 224; 236 INJECTION, SOLUTION INTRAVENOUS at 15:52

## 2018-01-01 RX ADMIN — LOSARTAN POTASSIUM 25 MG: 25 TABLET ORAL at 09:56

## 2018-01-01 RX ADMIN — POTASSIUM CHLORIDE 20 MEQ: 750 TABLET, EXTENDED RELEASE ORAL at 08:01

## 2018-01-01 RX ADMIN — LEVETIRACETAM 750 MG: 750 TABLET, FILM COATED ORAL at 09:23

## 2018-01-01 RX ADMIN — LORATADINE 10 MG: 10 TABLET ORAL at 11:09

## 2018-01-01 RX ADMIN — SIMETHICONE CHEW TAB 80 MG 80 MG: 80 TABLET ORAL at 21:13

## 2018-01-01 RX ADMIN — FUROSEMIDE 20 MG: 10 INJECTION, SOLUTION INTRAVENOUS at 08:04

## 2018-01-01 RX ADMIN — POTASSIUM & SODIUM PHOSPHATES POWDER PACK 280-160-250 MG 1 PACKET: 280-160-250 PACK at 12:12

## 2018-01-01 RX ADMIN — ACETAMINOPHEN 650 MG: 325 SOLUTION ORAL at 10:53

## 2018-01-01 RX ADMIN — LEVETIRACETAM 750 MG: 100 SOLUTION ORAL at 12:39

## 2018-01-01 RX ADMIN — CEFEPIME HYDROCHLORIDE 2 G: 2 INJECTION, POWDER, FOR SOLUTION INTRAVENOUS at 16:14

## 2018-01-01 RX ADMIN — Medication 5 MG: at 21:34

## 2018-01-01 RX ADMIN — SIMETHICONE CHEW TAB 80 MG 80 MG: 80 TABLET ORAL at 16:10

## 2018-01-01 RX ADMIN — DEXTROSE MONOHYDRATE: 100 INJECTION, SOLUTION INTRAVENOUS at 23:23

## 2018-01-01 RX ADMIN — SIMETHICONE CHEW TAB 80 MG 80 MG: 80 TABLET ORAL at 20:11

## 2018-01-01 RX ADMIN — METRONIDAZOLE 500 MG: 500 TABLET ORAL at 16:15

## 2018-01-01 RX ADMIN — PANTOPRAZOLE SODIUM 40 MG: 40 TABLET, DELAYED RELEASE ORAL at 09:21

## 2018-01-01 RX ADMIN — Medication 10 MG: at 21:56

## 2018-01-01 RX ADMIN — WARFARIN SODIUM 3 MG: 3 TABLET ORAL at 18:34

## 2018-01-01 RX ADMIN — Medication 1 PACKET: at 20:35

## 2018-01-01 RX ADMIN — LEVETIRACETAM 750 MG: 750 TABLET, FILM COATED ORAL at 11:50

## 2018-01-01 RX ADMIN — LEVOFLOXACIN 750 MG: 5 INJECTION, SOLUTION INTRAVENOUS at 20:00

## 2018-01-01 RX ADMIN — LEVETIRACETAM 750 MG: 750 TABLET, FILM COATED ORAL at 09:21

## 2018-01-01 RX ADMIN — HUMAN INSULIN 2 UNITS/HR: 100 INJECTION, SOLUTION SUBCUTANEOUS at 19:34

## 2018-01-01 RX ADMIN — Medication 1 PACKET: at 20:11

## 2018-01-01 RX ADMIN — LEVETIRACETAM 750 MG: 750 TABLET, FILM COATED ORAL at 08:59

## 2018-01-01 RX ADMIN — INSULIN HUMAN 88 UNITS: 100 INJECTION, SUSPENSION SUBCUTANEOUS at 17:39

## 2018-01-01 RX ADMIN — MODAFINIL 100 MG: 100 TABLET ORAL at 08:53

## 2018-01-01 RX ADMIN — LINEZOLID 600 MG: 600 INJECTION, SOLUTION INTRAVENOUS at 10:35

## 2018-01-01 RX ADMIN — WARFARIN SODIUM 5 MG: 5 TABLET ORAL at 17:39

## 2018-01-01 RX ADMIN — FINASTERIDE 5 MG: 5 TABLET, FILM COATED ORAL at 09:19

## 2018-01-01 RX ADMIN — SIMETHICONE CHEW TAB 80 MG 80 MG: 80 TABLET ORAL at 20:24

## 2018-01-01 RX ADMIN — INSULIN HUMAN 20 UNITS: 100 INJECTION, SUSPENSION SUBCUTANEOUS at 09:04

## 2018-01-01 RX ADMIN — PANTOPRAZOLE SODIUM 20 MG: 40 TABLET, DELAYED RELEASE ORAL at 08:29

## 2018-01-01 RX ADMIN — PANTOPRAZOLE SODIUM 40 MG: 40 TABLET, DELAYED RELEASE ORAL at 18:07

## 2018-01-01 RX ADMIN — SIMETHICONE CHEW TAB 80 MG 80 MG: 80 TABLET ORAL at 09:23

## 2018-01-01 RX ADMIN — INSULIN ASPART 2 UNITS: 100 INJECTION, SOLUTION INTRAVENOUS; SUBCUTANEOUS at 13:09

## 2018-01-01 RX ADMIN — PANTOPRAZOLE SODIUM 40 MG: 40 TABLET, DELAYED RELEASE ORAL at 09:32

## 2018-01-01 RX ADMIN — FINASTERIDE 5 MG: 5 TABLET, FILM COATED ORAL at 08:07

## 2018-01-01 RX ADMIN — INSULIN ASPART 1 UNITS: 100 INJECTION, SOLUTION INTRAVENOUS; SUBCUTANEOUS at 09:26

## 2018-01-01 RX ADMIN — FINASTERIDE 5 MG: 5 TABLET, FILM COATED ORAL at 09:56

## 2018-01-01 RX ADMIN — Medication 100 MG: at 18:07

## 2018-01-01 RX ADMIN — POTASSIUM & SODIUM PHOSPHATES POWDER PACK 280-160-250 MG 1 PACKET: 280-160-250 PACK at 08:12

## 2018-01-01 RX ADMIN — METOPROLOL SUCCINATE 50 MG: 50 TABLET, EXTENDED RELEASE ORAL at 08:54

## 2018-01-01 RX ADMIN — PANTOPRAZOLE SODIUM 20 MG: 40 TABLET, DELAYED RELEASE ORAL at 09:06

## 2018-01-01 RX ADMIN — LORATADINE 10 MG: 5 SOLUTION ORAL at 09:06

## 2018-01-01 RX ADMIN — FINASTERIDE 5 MG: 5 TABLET, FILM COATED ORAL at 08:28

## 2018-01-01 RX ADMIN — ACETAMINOPHEN 650 MG: 325 TABLET, FILM COATED ORAL at 11:09

## 2018-01-01 RX ADMIN — ASPIRIN 325 MG ORAL TABLET 325 MG: 325 PILL ORAL at 08:39

## 2018-01-01 RX ADMIN — LEVETIRACETAM 750 MG: 100 SOLUTION ORAL at 20:38

## 2018-01-01 RX ADMIN — WARFARIN SODIUM 2 MG: 2 TABLET ORAL at 17:31

## 2018-01-01 RX ADMIN — LORATADINE 10 MG: 5 SOLUTION ORAL at 08:09

## 2018-01-01 RX ADMIN — SIMETHICONE CHEW TAB 80 MG 80 MG: 80 TABLET ORAL at 16:27

## 2018-01-01 RX ADMIN — Medication 12.5 MG: at 18:06

## 2018-01-01 RX ADMIN — Medication 1 PACKET: at 20:39

## 2018-01-01 RX ADMIN — HUMAN INSULIN 8 UNITS/HR: 100 INJECTION, SOLUTION SUBCUTANEOUS at 21:30

## 2018-01-01 RX ADMIN — ASPIRIN 325 MG ORAL TABLET 325 MG: 325 PILL ORAL at 20:01

## 2018-01-01 RX ADMIN — METRONIDAZOLE 500 MG: 500 TABLET ORAL at 09:15

## 2018-01-01 RX ADMIN — Medication 100 MG: at 09:15

## 2018-01-01 RX ADMIN — ACETAMINOPHEN 650 MG: 325 TABLET, FILM COATED ORAL at 17:22

## 2018-01-01 RX ADMIN — LEVETIRACETAM 750 MG: 100 SOLUTION ORAL at 23:02

## 2018-01-01 RX ADMIN — LEVETIRACETAM 750 MG: 100 INJECTION, SOLUTION INTRAVENOUS at 20:35

## 2018-01-01 RX ADMIN — MICONAZOLE NITRATE: 2 POWDER TOPICAL at 22:11

## 2018-01-01 RX ADMIN — Medication 5 MG: at 21:05

## 2018-01-01 RX ADMIN — LOSARTAN POTASSIUM 12.5 MG: 100 TABLET, FILM COATED ORAL at 09:15

## 2018-01-01 RX ADMIN — Medication 1 PACKET: at 08:49

## 2018-01-01 RX ADMIN — HUMAN INSULIN 9 UNITS/HR: 100 INJECTION, SOLUTION SUBCUTANEOUS at 00:56

## 2018-01-01 RX ADMIN — Medication 100 MG: at 08:59

## 2018-01-01 RX ADMIN — METOPROLOL SUCCINATE 50 MG: 50 TABLET, EXTENDED RELEASE ORAL at 09:57

## 2018-01-01 RX ADMIN — LOSARTAN POTASSIUM 12.5 MG: 100 TABLET, FILM COATED ORAL at 08:28

## 2018-01-01 RX ADMIN — Medication 5 MG: at 21:13

## 2018-01-01 RX ADMIN — INSULIN HUMAN 20 UNITS: 100 INJECTION, SUSPENSION SUBCUTANEOUS at 09:14

## 2018-01-01 RX ADMIN — LEVETIRACETAM 500 MG: 100 SOLUTION ORAL at 08:40

## 2018-01-01 RX ADMIN — WARFARIN SODIUM 3 MG: 3 TABLET ORAL at 18:17

## 2018-01-01 RX ADMIN — HUMAN INSULIN 8 UNITS/HR: 100 INJECTION, SOLUTION SUBCUTANEOUS at 11:03

## 2018-01-01 RX ADMIN — Medication 10 MG: at 21:53

## 2018-01-01 RX ADMIN — WARFARIN SODIUM 3 MG: 3 TABLET ORAL at 17:43

## 2018-01-01 RX ADMIN — LEVETIRACETAM 750 MG: 250 TABLET, FILM COATED ORAL at 20:11

## 2018-01-01 RX ADMIN — LINEZOLID 600 MG: 600 TABLET, FILM COATED ORAL at 13:04

## 2018-01-01 RX ADMIN — KETOTIFEN FUMARATE 1 DROP: 0.35 SOLUTION/ DROPS OPHTHALMIC at 08:55

## 2018-01-01 RX ADMIN — LIDOCAINE HYDROCHLORIDE 5 ML: 20 SOLUTION ORAL; TOPICAL at 13:19

## 2018-01-01 RX ADMIN — PANTOPRAZOLE SODIUM 40 MG: 40 TABLET, DELAYED RELEASE ORAL at 09:37

## 2018-01-01 RX ADMIN — LORAZEPAM 1 MG: 2 INJECTION INTRAMUSCULAR; INTRAVENOUS at 21:42

## 2018-01-01 RX ADMIN — Medication 1 PACKET: at 21:04

## 2018-01-01 RX ADMIN — LOSARTAN POTASSIUM 12.5 MG: 100 TABLET, FILM COATED ORAL at 09:10

## 2018-01-01 RX ADMIN — SODIUM CHLORIDE 500 ML: 9 INJECTION, SOLUTION INTRAVENOUS at 21:44

## 2018-01-01 RX ADMIN — LEVETIRACETAM 750 MG: 750 TABLET, FILM COATED ORAL at 20:24

## 2018-01-01 RX ADMIN — Medication 1 PACKET: at 20:44

## 2018-01-01 RX ADMIN — MODAFINIL 100 MG: 100 TABLET ORAL at 09:18

## 2018-01-01 RX ADMIN — WARFARIN SODIUM 2 MG: 2 TABLET ORAL at 17:37

## 2018-01-01 RX ADMIN — FINASTERIDE 5 MG: 5 TABLET, FILM COATED ORAL at 15:57

## 2018-01-01 RX ADMIN — FINASTERIDE 5 MG: 5 TABLET, FILM COATED ORAL at 08:01

## 2018-01-01 RX ADMIN — LINEZOLID 600 MG: 600 TABLET, FILM COATED ORAL at 10:35

## 2018-01-01 RX ADMIN — SODIUM CHLORIDE 250 ML: 9 INJECTION, SOLUTION INTRAVENOUS at 20:36

## 2018-01-01 RX ADMIN — HYDRALAZINE HYDROCHLORIDE 25 MG: 25 TABLET ORAL at 12:15

## 2018-01-01 RX ADMIN — CEFEPIME HYDROCHLORIDE 2 G: 2 INJECTION, POWDER, FOR SOLUTION INTRAVENOUS at 16:01

## 2018-01-01 RX ADMIN — SIMETHICONE CHEW TAB 80 MG 80 MG: 80 TABLET ORAL at 09:37

## 2018-01-01 RX ADMIN — MULTIPLE VITAMINS W/ MINERALS TAB 1 TABLET: TAB at 09:08

## 2018-01-01 RX ADMIN — Medication 10 MG: at 22:11

## 2018-01-01 RX ADMIN — ONDANSETRON HYDROCHLORIDE 4 MG: 2 INJECTION, SOLUTION INTRAMUSCULAR; INTRAVENOUS at 10:49

## 2018-01-01 RX ADMIN — SIMETHICONE CHEW TAB 80 MG 80 MG: 80 TABLET ORAL at 15:57

## 2018-01-01 RX ADMIN — SENNOSIDES AND DOCUSATE SODIUM 2 TABLET: 8.6; 5 TABLET ORAL at 11:50

## 2018-01-01 RX ADMIN — SENNOSIDES AND DOCUSATE SODIUM 2 TABLET: 8.6; 5 TABLET ORAL at 09:33

## 2018-01-01 RX ADMIN — LOSARTAN POTASSIUM 12.5 MG: 100 TABLET, FILM COATED ORAL at 09:07

## 2018-01-01 RX ADMIN — SIMETHICONE CHEW TAB 80 MG 80 MG: 80 TABLET ORAL at 12:38

## 2018-01-01 RX ADMIN — LORATADINE 10 MG: 5 SOLUTION ORAL at 09:03

## 2018-01-01 RX ADMIN — ACETAMINOPHEN 650 MG: 325 SOLUTION ORAL at 21:31

## 2018-01-01 RX ADMIN — LEVETIRACETAM 750 MG: 100 SOLUTION ORAL at 09:10

## 2018-01-01 RX ADMIN — Medication 1 PACKET: at 13:09

## 2018-01-01 RX ADMIN — BISACODYL 10 MG: 10 SUPPOSITORY RECTAL at 17:41

## 2018-01-01 RX ADMIN — SIMETHICONE CHEW TAB 80 MG 80 MG: 80 TABLET ORAL at 20:58

## 2018-01-01 RX ADMIN — SIMETHICONE CHEW TAB 80 MG 80 MG: 80 TABLET ORAL at 06:48

## 2018-01-01 RX ADMIN — Medication 1 PACKET: at 09:39

## 2018-01-01 RX ADMIN — LINEZOLID 600 MG: 600 INJECTION, SOLUTION INTRAVENOUS at 11:50

## 2018-01-01 RX ADMIN — METRONIDAZOLE 500 MG: 500 TABLET ORAL at 16:14

## 2018-01-01 RX ADMIN — Medication 81 MG: at 08:12

## 2018-01-01 RX ADMIN — WARFARIN SODIUM 6 MG: 6 TABLET ORAL at 22:20

## 2018-01-01 RX ADMIN — HUMAN INSULIN 14 UNITS/HR: 100 INJECTION, SOLUTION SUBCUTANEOUS at 07:02

## 2018-01-01 RX ADMIN — KETOTIFEN FUMARATE 1 DROP: 0.35 SOLUTION/ DROPS OPHTHALMIC at 08:07

## 2018-01-01 RX ADMIN — MULTIVITAMIN 15 ML: LIQUID ORAL at 09:10

## 2018-01-01 RX ADMIN — SENNOSIDES AND DOCUSATE SODIUM 2 TABLET: 8.6; 5 TABLET ORAL at 22:42

## 2018-01-01 RX ADMIN — LINEZOLID 600 MG: 600 TABLET, FILM COATED ORAL at 08:05

## 2018-01-01 RX ADMIN — MICONAZOLE NITRATE: 2 POWDER TOPICAL at 01:03

## 2018-01-01 RX ADMIN — SENNOSIDES AND DOCUSATE SODIUM 2 TABLET: 8.6; 5 TABLET ORAL at 09:00

## 2018-01-01 RX ADMIN — SODIUM CHLORIDE, PRESERVATIVE FREE 90 ML: 5 INJECTION INTRAVENOUS at 22:32

## 2018-01-01 RX ADMIN — Medication 100 MG: at 08:29

## 2018-01-01 RX ADMIN — LEVETIRACETAM 750 MG: 100 SOLUTION ORAL at 08:11

## 2018-01-01 RX ADMIN — LORATADINE 10 MG: 5 SOLUTION ORAL at 09:01

## 2018-01-01 RX ADMIN — FINASTERIDE 5 MG: 5 TABLET, FILM COATED ORAL at 10:34

## 2018-01-01 RX ADMIN — Medication 5 MG: at 22:13

## 2018-01-01 RX ADMIN — AMIODARONE HYDROCHLORIDE 1 MG/MIN: 50 INJECTION, SOLUTION INTRAVENOUS at 05:07

## 2018-01-01 RX ADMIN — MICONAZOLE NITRATE: 2 POWDER TOPICAL at 09:09

## 2018-01-01 RX ADMIN — Medication 1 PACKET: at 20:43

## 2018-01-01 RX ADMIN — Medication 10 MG: at 23:39

## 2018-01-01 RX ADMIN — SENNOSIDES AND DOCUSATE SODIUM 2 TABLET: 8.6; 5 TABLET ORAL at 09:38

## 2018-01-01 RX ADMIN — LINEZOLID 600 MG: 600 TABLET, FILM COATED ORAL at 00:15

## 2018-01-01 RX ADMIN — ONDANSETRON 4 MG: 4 TABLET, ORALLY DISINTEGRATING ORAL at 08:53

## 2018-01-01 RX ADMIN — WARFARIN SODIUM 3 MG: 3 TABLET ORAL at 18:05

## 2018-01-01 RX ADMIN — Medication 1 PACKET: at 14:51

## 2018-01-01 RX ADMIN — INSULIN ASPART 1 UNITS: 100 INJECTION, SOLUTION INTRAVENOUS; SUBCUTANEOUS at 17:11

## 2018-01-01 RX ADMIN — PANTOPRAZOLE SODIUM 20 MG: 40 TABLET, DELAYED RELEASE ORAL at 08:10

## 2018-01-01 RX ADMIN — Medication 10 MG: at 20:42

## 2018-01-01 RX ADMIN — PANTOPRAZOLE SODIUM 20 MG: 20 TABLET, DELAYED RELEASE ORAL at 17:54

## 2018-01-01 RX ADMIN — HUMAN INSULIN 8 UNITS/HR: 100 INJECTION, SOLUTION SUBCUTANEOUS at 03:45

## 2018-01-01 RX ADMIN — METRONIDAZOLE 500 MG: 500 TABLET ORAL at 16:01

## 2018-01-01 RX ADMIN — ACETAMINOPHEN 650 MG: 325 SOLUTION ORAL at 23:39

## 2018-01-01 RX ADMIN — LEVETIRACETAM 750 MG: 750 TABLET, FILM COATED ORAL at 20:11

## 2018-01-01 RX ADMIN — Medication 100 MG: at 07:36

## 2018-01-01 RX ADMIN — Medication 81 MG: at 09:04

## 2018-01-01 RX ADMIN — Medication 100 MG: at 16:09

## 2018-01-01 RX ADMIN — Medication 12.5 MG: at 17:45

## 2018-01-01 RX ADMIN — FINASTERIDE 5 MG: 5 TABLET, FILM COATED ORAL at 08:43

## 2018-01-01 RX ADMIN — LEVETIRACETAM 750 MG: 100 SOLUTION ORAL at 09:15

## 2018-01-01 RX ADMIN — LINEZOLID 600 MG: 600 TABLET, FILM COATED ORAL at 13:06

## 2018-01-01 RX ADMIN — Medication 100 MG: at 11:36

## 2018-01-01 RX ADMIN — WARFARIN SODIUM 4 MG: 4 TABLET ORAL at 18:08

## 2018-01-01 RX ADMIN — Medication 1 PACKET: at 14:04

## 2018-01-01 RX ADMIN — Medication 10 MG: at 20:37

## 2018-01-01 RX ADMIN — Medication 1 PACKET: at 09:08

## 2018-01-01 RX ADMIN — MODAFINIL 100 MG: 100 TABLET ORAL at 11:36

## 2018-01-01 RX ADMIN — HEPARIN SODIUM 1050 UNITS/HR: 10000 INJECTION, SOLUTION INTRAVENOUS at 14:22

## 2018-01-01 RX ADMIN — POTASSIUM PHOSPHATE, MONOBASIC AND POTASSIUM PHOSPHATE, DIBASIC 20 MMOL: 224; 236 INJECTION, SOLUTION INTRAVENOUS at 02:36

## 2018-01-01 RX ADMIN — ACETAMINOPHEN 650 MG: 325 TABLET, FILM COATED ORAL at 20:32

## 2018-01-01 RX ADMIN — POTASSIUM CHLORIDE 20 MEQ: 1.5 POWDER, FOR SOLUTION ORAL at 17:39

## 2018-01-01 RX ADMIN — Medication 5 MG: at 21:42

## 2018-01-01 RX ADMIN — POTASSIUM CHLORIDE 20 MEQ: 1.5 POWDER, FOR SOLUTION ORAL at 14:51

## 2018-01-01 RX ADMIN — TAMSULOSIN HYDROCHLORIDE 0.4 MG: 0.4 CAPSULE ORAL at 16:42

## 2018-01-01 RX ADMIN — Medication 1 PACKET: at 16:43

## 2018-01-01 RX ADMIN — Medication 12.5 MG: at 09:21

## 2018-01-01 RX ADMIN — LEVETIRACETAM 750 MG: 100 SOLUTION ORAL at 09:24

## 2018-01-01 RX ADMIN — PANTOPRAZOLE SODIUM 20 MG: 40 TABLET, DELAYED RELEASE ORAL at 09:10

## 2018-01-01 RX ADMIN — TAMSULOSIN HYDROCHLORIDE 0.8 MG: 0.4 CAPSULE ORAL at 08:02

## 2018-01-01 RX ADMIN — FINASTERIDE 5 MG: 5 TABLET, FILM COATED ORAL at 08:39

## 2018-01-01 RX ADMIN — MODAFINIL 100 MG: 100 TABLET ORAL at 13:35

## 2018-01-01 RX ADMIN — ASPIRIN 81 MG: 81 TABLET, COATED ORAL at 14:25

## 2018-01-01 RX ADMIN — MULTIVITAMIN 15 ML: LIQUID ORAL at 07:36

## 2018-01-01 RX ADMIN — LEVETIRACETAM 750 MG: 100 SOLUTION ORAL at 09:38

## 2018-01-01 RX ADMIN — HUMAN INSULIN 10 UNITS/HR: 100 INJECTION, SOLUTION SUBCUTANEOUS at 14:25

## 2018-01-01 RX ADMIN — ACETAMINOPHEN 650 MG: 325 TABLET, FILM COATED ORAL at 16:05

## 2018-01-01 RX ADMIN — TAMSULOSIN HYDROCHLORIDE 0.8 MG: 0.4 CAPSULE ORAL at 08:38

## 2018-01-01 RX ADMIN — HUMAN INSULIN 6 UNITS/HR: 100 INJECTION, SOLUTION SUBCUTANEOUS at 20:37

## 2018-01-01 RX ADMIN — TAMSULOSIN HYDROCHLORIDE 0.8 MG: 0.4 CAPSULE ORAL at 18:07

## 2018-01-01 RX ADMIN — HEPARIN SODIUM 900 UNITS/HR: 10000 INJECTION, SOLUTION INTRAVENOUS at 00:07

## 2018-01-01 RX ADMIN — METOPROLOL SUCCINATE 25 MG: 25 TABLET, EXTENDED RELEASE ORAL at 21:06

## 2018-01-01 RX ADMIN — HUMAN INSULIN 6 UNITS/HR: 100 INJECTION, SOLUTION SUBCUTANEOUS at 14:21

## 2018-01-01 RX ADMIN — HUMAN ALBUMIN MICROSPHERES AND PERFLUTREN 6 ML: 10; .22 INJECTION, SOLUTION INTRAVENOUS at 16:52

## 2018-01-01 RX ADMIN — ASPIRIN 81 MG CHEWABLE TABLET 81 MG: 81 TABLET CHEWABLE at 10:57

## 2018-01-01 RX ADMIN — HUMAN INSULIN 8 UNITS/HR: 100 INJECTION, SOLUTION SUBCUTANEOUS at 06:04

## 2018-01-01 RX ADMIN — Medication 81 MG: at 08:28

## 2018-01-01 RX ADMIN — Medication 81 MG: at 08:47

## 2018-01-01 RX ADMIN — Medication 10 MG: at 21:00

## 2018-01-01 RX ADMIN — LEVETIRACETAM 750 MG: 750 TABLET, FILM COATED ORAL at 21:13

## 2018-01-01 RX ADMIN — LEVETIRACETAM 750 MG: 100 SOLUTION ORAL at 09:25

## 2018-01-01 RX ADMIN — Medication 1 PACKET: at 07:37

## 2018-01-01 RX ADMIN — HUMAN INSULIN 19 UNITS/HR: 100 INJECTION, SOLUTION SUBCUTANEOUS at 05:06

## 2018-01-01 RX ADMIN — FERRIC OXIDE RED: 8; 8 LOTION TOPICAL at 17:50

## 2018-01-01 RX ADMIN — Medication 100 MG: at 09:11

## 2018-01-01 RX ADMIN — Medication 1 PACKET: at 13:01

## 2018-01-01 RX ADMIN — SIMETHICONE CHEW TAB 80 MG 80 MG: 80 TABLET ORAL at 20:33

## 2018-01-01 RX ADMIN — PANTOPRAZOLE SODIUM 40 MG: 40 TABLET, DELAYED RELEASE ORAL at 06:47

## 2018-01-01 RX ADMIN — SENNOSIDES AND DOCUSATE SODIUM 2 TABLET: 8.6; 5 TABLET ORAL at 10:44

## 2018-01-01 RX ADMIN — PANTOPRAZOLE SODIUM 20 MG: 20 TABLET, DELAYED RELEASE ORAL at 09:25

## 2018-01-01 RX ADMIN — METOPROLOL SUCCINATE 50 MG: 50 TABLET, EXTENDED RELEASE ORAL at 09:22

## 2018-01-01 RX ADMIN — ACETAMINOPHEN 650 MG: 325 SOLUTION ORAL at 21:58

## 2018-01-01 RX ADMIN — LINEZOLID 600 MG: 600 INJECTION, SOLUTION INTRAVENOUS at 00:07

## 2018-01-01 RX ADMIN — FINASTERIDE 5 MG: 5 TABLET, FILM COATED ORAL at 09:10

## 2018-01-01 RX ADMIN — WARFARIN SODIUM 4.5 MG: 2.5 TABLET ORAL at 22:30

## 2018-01-01 RX ADMIN — SIMETHICONE CHEW TAB 80 MG 80 MG: 80 TABLET ORAL at 18:34

## 2018-01-01 RX ADMIN — Medication 12.5 MG: at 18:34

## 2018-01-01 RX ADMIN — CEFEPIME HYDROCHLORIDE 2 G: 2 INJECTION, POWDER, FOR SOLUTION INTRAVENOUS at 16:00

## 2018-01-01 RX ADMIN — MULTIVITAMIN 15 ML: LIQUID ORAL at 09:38

## 2018-01-01 RX ADMIN — ACETAMINOPHEN 650 MG: 325 SOLUTION ORAL at 11:36

## 2018-01-01 RX ADMIN — FINASTERIDE 5 MG: 5 TABLET, FILM COATED ORAL at 09:07

## 2018-01-01 RX ADMIN — LEVETIRACETAM 750 MG: 100 SOLUTION ORAL at 20:29

## 2018-01-01 RX ADMIN — MULTIPLE VITAMINS W/ MINERALS TAB 1 TABLET: TAB at 08:29

## 2018-01-01 RX ADMIN — KETOTIFEN FUMARATE 1 DROP: 0.35 SOLUTION/ DROPS OPHTHALMIC at 09:33

## 2018-01-01 RX ADMIN — PANTOPRAZOLE SODIUM 20 MG: 40 TABLET, DELAYED RELEASE ORAL at 08:57

## 2018-01-01 RX ADMIN — LOSARTAN POTASSIUM 25 MG: 25 TABLET, FILM COATED ORAL at 16:00

## 2018-01-01 RX ADMIN — LOSARTAN POTASSIUM 12.5 MG: 100 TABLET, FILM COATED ORAL at 08:11

## 2018-01-01 RX ADMIN — SODIUM CHLORIDE: 9 INJECTION, SOLUTION INTRAVENOUS at 06:23

## 2018-01-01 RX ADMIN — SODIUM CHLORIDE 500 ML: 9 INJECTION, SOLUTION INTRAVENOUS at 21:22

## 2018-01-01 RX ADMIN — Medication 81 MG: at 09:23

## 2018-01-01 RX ADMIN — METOPROLOL SUCCINATE 25 MG: 25 TABLET, EXTENDED RELEASE ORAL at 22:13

## 2018-01-01 RX ADMIN — MULTIVITAMIN 15 ML: LIQUID ORAL at 09:24

## 2018-01-01 RX ADMIN — LEVETIRACETAM 750 MG: 750 TABLET, FILM COATED ORAL at 20:34

## 2018-01-01 RX ADMIN — DOXAZOSIN MESYLATE 2 MG: 2 TABLET ORAL at 09:39

## 2018-01-01 RX ADMIN — ACETAMINOPHEN 650 MG: 325 SOLUTION ORAL at 18:30

## 2018-01-01 RX ADMIN — LEVETIRACETAM 750 MG: 100 SOLUTION ORAL at 20:40

## 2018-01-01 RX ADMIN — ACETAMINOPHEN 650 MG: 325 TABLET, FILM COATED ORAL at 21:53

## 2018-01-01 RX ADMIN — Medication 1 PACKET: at 20:40

## 2018-01-01 RX ADMIN — Medication 5 MG: at 21:51

## 2018-01-01 RX ADMIN — METOPROLOL SUCCINATE 50 MG: 50 TABLET, EXTENDED RELEASE ORAL at 08:59

## 2018-01-01 RX ADMIN — LOSARTAN POTASSIUM 25 MG: 25 TABLET, FILM COATED ORAL at 08:04

## 2018-01-01 RX ADMIN — LORATADINE 10 MG: 5 SOLUTION ORAL at 07:36

## 2018-01-01 RX ADMIN — ACETAMINOPHEN 650 MG: 325 SOLUTION ORAL at 01:13

## 2018-01-01 RX ADMIN — Medication 100 MG: at 09:08

## 2018-01-01 RX ADMIN — ACETAMINOPHEN 650 MG: 325 SOLUTION ORAL at 08:55

## 2018-01-01 RX ADMIN — INSULIN HUMAN 20 UNITS: 100 INJECTION, SUSPENSION SUBCUTANEOUS at 08:57

## 2018-01-01 RX ADMIN — ASPIRIN 81 MG CHEWABLE TABLET 81 MG: 81 TABLET CHEWABLE at 08:09

## 2018-01-01 RX ADMIN — LOSARTAN POTASSIUM 12.5 MG: 100 TABLET, FILM COATED ORAL at 09:00

## 2018-01-01 RX ADMIN — Medication 1 PACKET: at 20:29

## 2018-01-01 RX ADMIN — DEXTROSE MONOHYDRATE: 50 INJECTION, SOLUTION INTRAVENOUS at 08:49

## 2018-01-01 RX ADMIN — PANTOPRAZOLE SODIUM 20 MG: 40 TABLET, DELAYED RELEASE ORAL at 20:42

## 2018-01-01 RX ADMIN — Medication 100 MG: at 08:53

## 2018-01-01 RX ADMIN — ASPIRIN 81 MG CHEWABLE TABLET 81 MG: 81 TABLET CHEWABLE at 09:25

## 2018-01-01 RX ADMIN — POTASSIUM & SODIUM PHOSPHATES POWDER PACK 280-160-250 MG 1 PACKET: 280-160-250 PACK at 11:09

## 2018-01-01 RX ADMIN — WARFARIN SODIUM 2 MG: 2 TABLET ORAL at 18:35

## 2018-01-01 RX ADMIN — ACETAMINOPHEN 650 MG: 325 SOLUTION ORAL at 01:27

## 2018-01-01 RX ADMIN — SENNOSIDES AND DOCUSATE SODIUM 1 TABLET: 8.6; 5 TABLET ORAL at 09:43

## 2018-01-01 RX ADMIN — Medication 10 MG: at 22:25

## 2018-01-01 RX ADMIN — LEVETIRACETAM 750 MG: 750 TABLET, FILM COATED ORAL at 09:38

## 2018-01-01 RX ADMIN — HUMAN INSULIN 12 UNITS/HR: 100 INJECTION, SOLUTION SUBCUTANEOUS at 09:52

## 2018-01-01 RX ADMIN — TAMSULOSIN HYDROCHLORIDE 0.8 MG: 0.4 CAPSULE ORAL at 08:58

## 2018-01-01 RX ADMIN — ACETAMINOPHEN 325 MG: 325 TABLET, FILM COATED ORAL at 13:55

## 2018-01-01 RX ADMIN — Medication 1 PACKET: at 13:40

## 2018-01-01 RX ADMIN — LEVETIRACETAM 750 MG: 100 SOLUTION ORAL at 20:42

## 2018-01-01 RX ADMIN — LORATADINE 10 MG: 10 TABLET ORAL at 08:43

## 2018-01-01 RX ADMIN — LIDOCAINE HYDROCHLORIDE 5 ML: 20 SOLUTION ORAL; TOPICAL at 15:26

## 2018-01-01 RX ADMIN — POTASSIUM PHOSPHATE, MONOBASIC AND POTASSIUM PHOSPHATE, DIBASIC 15 MMOL: 224; 236 INJECTION, SOLUTION INTRAVENOUS at 16:10

## 2018-01-01 RX ADMIN — KETOTIFEN FUMARATE 1 DROP: 0.35 SOLUTION/ DROPS OPHTHALMIC at 10:54

## 2018-01-01 RX ADMIN — SENNOSIDES AND DOCUSATE SODIUM 1 TABLET: 8.6; 5 TABLET ORAL at 20:58

## 2018-01-01 RX ADMIN — HUMAN INSULIN 5 UNITS/HR: 100 INJECTION, SOLUTION SUBCUTANEOUS at 21:53

## 2018-01-01 RX ADMIN — LEVETIRACETAM 750 MG: 100 SOLUTION ORAL at 08:29

## 2018-01-01 RX ADMIN — LORATADINE 10 MG: 10 TABLET ORAL at 11:50

## 2018-01-01 RX ADMIN — SENNOSIDES AND DOCUSATE SODIUM 2 TABLET: 8.6; 5 TABLET ORAL at 21:13

## 2018-01-01 RX ADMIN — LEVETIRACETAM 750 MG: 100 SOLUTION ORAL at 08:09

## 2018-01-01 RX ADMIN — HYDROXYZINE HYDROCHLORIDE 10 MG: 10 TABLET ORAL at 00:54

## 2018-01-01 RX ADMIN — SENNOSIDES AND DOCUSATE SODIUM 2 TABLET: 8.6; 5 TABLET ORAL at 20:11

## 2018-01-01 RX ADMIN — METRONIDAZOLE 500 MG: 500 TABLET ORAL at 08:44

## 2018-01-01 RX ADMIN — LOSARTAN POTASSIUM 25 MG: 25 TABLET ORAL at 08:59

## 2018-01-01 RX ADMIN — Medication 1 PACKET: at 19:41

## 2018-01-01 RX ADMIN — METOPROLOL SUCCINATE 50 MG: 50 TABLET, EXTENDED RELEASE ORAL at 08:38

## 2018-01-01 RX ADMIN — HEPARIN SODIUM 950 UNITS/HR: 10000 INJECTION, SOLUTION INTRAVENOUS at 08:59

## 2018-01-01 RX ADMIN — HEPARIN SODIUM 750 UNITS/HR: 10000 INJECTION, SOLUTION INTRAVENOUS at 08:50

## 2018-01-01 RX ADMIN — Medication 10 MG: at 20:40

## 2018-01-01 RX ADMIN — POTASSIUM & SODIUM PHOSPHATES POWDER PACK 280-160-250 MG 1 PACKET: 280-160-250 PACK at 16:26

## 2018-01-01 RX ADMIN — LEVETIRACETAM 750 MG: 250 TABLET, FILM COATED ORAL at 08:01

## 2018-01-01 RX ADMIN — KETOTIFEN FUMARATE 1 DROP: 0.35 SOLUTION/ DROPS OPHTHALMIC at 08:46

## 2018-01-01 RX ADMIN — POTASSIUM & SODIUM PHOSPHATES POWDER PACK 280-160-250 MG 1 PACKET: 280-160-250 PACK at 07:36

## 2018-01-01 RX ADMIN — WARFARIN SODIUM 1 MG: 1 TABLET ORAL at 14:59

## 2018-01-01 RX ADMIN — POTASSIUM & SODIUM PHOSPHATES POWDER PACK 280-160-250 MG 1 PACKET: 280-160-250 PACK at 12:37

## 2018-01-01 RX ADMIN — SIMETHICONE CHEW TAB 80 MG 80 MG: 80 TABLET ORAL at 11:57

## 2018-01-01 RX ADMIN — FINASTERIDE 5 MG: 5 TABLET, FILM COATED ORAL at 09:26

## 2018-01-01 RX ADMIN — MULTIVITAMIN 15 ML: LIQUID ORAL at 09:26

## 2018-01-01 RX ADMIN — SODIUM CHLORIDE 250 ML: 9 INJECTION, SOLUTION INTRAVENOUS at 04:11

## 2018-01-01 RX ADMIN — PIPERACILLIN SODIUM AND TAZOBACTAM SODIUM 3.38 G: 3; .375 INJECTION, POWDER, LYOPHILIZED, FOR SOLUTION INTRAVENOUS at 00:09

## 2018-01-01 RX ADMIN — WARFARIN SODIUM 3 MG: 3 TABLET ORAL at 17:56

## 2018-01-01 RX ADMIN — HUMAN INSULIN 6 UNITS/HR: 100 INJECTION, SOLUTION SUBCUTANEOUS at 17:13

## 2018-01-01 RX ADMIN — PANTOPRAZOLE SODIUM 20 MG: 20 TABLET, DELAYED RELEASE ORAL at 09:20

## 2018-01-01 RX ADMIN — LOSARTAN POTASSIUM 12.5 MG: 100 TABLET, FILM COATED ORAL at 08:57

## 2018-01-01 RX ADMIN — Medication 1 PACKET: at 08:40

## 2018-01-01 RX ADMIN — MICONAZOLE NITRATE: 2 POWDER TOPICAL at 22:16

## 2018-01-01 RX ADMIN — HUMAN INSULIN 1.5 UNITS/HR: 100 INJECTION, SOLUTION SUBCUTANEOUS at 18:04

## 2018-01-01 RX ADMIN — SENNOSIDES AND DOCUSATE SODIUM 2 TABLET: 8.6; 5 TABLET ORAL at 09:20

## 2018-01-01 RX ADMIN — ASPIRIN 81 MG 324 MG: 81 TABLET ORAL at 21:27

## 2018-01-01 RX ADMIN — ACETAMINOPHEN 325 MG: 325 TABLET, FILM COATED ORAL at 01:24

## 2018-01-01 RX ADMIN — Medication 0.5 MG: at 17:16

## 2018-01-01 RX ADMIN — SIMETHICONE CHEW TAB 80 MG 80 MG: 80 TABLET ORAL at 18:33

## 2018-01-01 RX ADMIN — ASPIRIN 325 MG ORAL TABLET 325 MG: 325 PILL ORAL at 16:42

## 2018-01-01 RX ADMIN — ACETAMINOPHEN 650 MG: 325 SOLUTION ORAL at 09:01

## 2018-01-01 RX ADMIN — Medication 1 PACKET: at 20:27

## 2018-01-01 RX ADMIN — METOPROLOL SUCCINATE 50 MG: 50 TABLET, EXTENDED RELEASE ORAL at 09:38

## 2018-01-01 RX ADMIN — POTASSIUM & SODIUM PHOSPHATES POWDER PACK 280-160-250 MG 1 PACKET: 280-160-250 PACK at 20:38

## 2018-01-01 RX ADMIN — Medication 100 MG: at 09:01

## 2018-01-01 RX ADMIN — LORATADINE 10 MG: 5 SOLUTION ORAL at 09:10

## 2018-01-01 RX ADMIN — SIMETHICONE CHEW TAB 80 MG 80 MG: 80 TABLET ORAL at 18:09

## 2018-01-01 RX ADMIN — FINASTERIDE 5 MG: 5 TABLET, FILM COATED ORAL at 08:12

## 2018-01-01 RX ADMIN — LEVETIRACETAM 750 MG: 100 SOLUTION ORAL at 22:11

## 2018-01-01 RX ADMIN — Medication 100 MG: at 09:37

## 2018-01-01 RX ADMIN — MULTIVITAMIN 15 ML: LIQUID ORAL at 16:07

## 2018-01-01 RX ADMIN — HEPARIN SODIUM 950 UNITS/HR: 10000 INJECTION, SOLUTION INTRAVENOUS at 09:40

## 2018-01-01 RX ADMIN — SIMETHICONE CHEW TAB 80 MG 80 MG: 80 TABLET ORAL at 11:50

## 2018-01-01 RX ADMIN — SIMETHICONE CHEW TAB 80 MG 80 MG: 80 TABLET ORAL at 08:59

## 2018-01-01 RX ADMIN — WARFARIN SODIUM 3 MG: 3 TABLET ORAL at 20:34

## 2018-01-01 RX ADMIN — Medication 5 MG: at 21:27

## 2018-01-01 RX ADMIN — SODIUM CHLORIDE 250 ML: 9 INJECTION, SOLUTION INTRAVENOUS at 23:16

## 2018-01-01 RX ADMIN — Medication 81 MG: at 09:08

## 2018-01-01 RX ADMIN — Medication 100 MG: at 08:43

## 2018-01-01 RX ADMIN — LINEZOLID 600 MG: 600 TABLET, FILM COATED ORAL at 00:23

## 2018-01-01 RX ADMIN — FINASTERIDE 5 MG: 5 TABLET, FILM COATED ORAL at 09:39

## 2018-01-01 RX ADMIN — FINASTERIDE 5 MG: 5 TABLET, FILM COATED ORAL at 09:15

## 2018-01-01 RX ADMIN — LORATADINE 10 MG: 10 TABLET ORAL at 08:01

## 2018-01-01 RX ADMIN — FINASTERIDE 5 MG: 5 TABLET, FILM COATED ORAL at 09:31

## 2018-01-01 RX ADMIN — Medication 2 MG: at 07:36

## 2018-01-01 RX ADMIN — WARFARIN SODIUM 3 MG: 3 TABLET ORAL at 18:11

## 2018-01-01 RX ADMIN — Medication 1 TABLET: at 18:30

## 2018-01-01 RX ADMIN — METOPROLOL SUCCINATE 25 MG: 25 TABLET, EXTENDED RELEASE ORAL at 21:34

## 2018-01-01 RX ADMIN — SENNOSIDES AND DOCUSATE SODIUM 2 TABLET: 8.6; 5 TABLET ORAL at 10:36

## 2018-01-01 RX ADMIN — LINEZOLID 600 MG: 600 TABLET, FILM COATED ORAL at 09:19

## 2018-01-01 RX ADMIN — HUMAN INSULIN 10 UNITS/HR: 100 INJECTION, SOLUTION SUBCUTANEOUS at 00:01

## 2018-01-01 RX ADMIN — LORATADINE 10 MG: 5 SOLUTION ORAL at 08:57

## 2018-01-01 RX ADMIN — Medication: at 10:07

## 2018-01-01 RX ADMIN — LEVOFLOXACIN 750 MG: 25 SOLUTION ORAL at 23:53

## 2018-01-01 RX ADMIN — LORATADINE 10 MG: 10 TABLET ORAL at 09:22

## 2018-01-01 RX ADMIN — PANTOPRAZOLE SODIUM 20 MG: 20 TABLET, DELAYED RELEASE ORAL at 11:50

## 2018-01-01 RX ADMIN — HUMAN INSULIN 10 UNITS/HR: 100 INJECTION, SOLUTION SUBCUTANEOUS at 13:12

## 2018-01-01 RX ADMIN — DOXAZOSIN MESYLATE 2 MG: 2 TABLET ORAL at 16:09

## 2018-01-01 RX ADMIN — Medication 10 MG: at 22:07

## 2018-01-01 RX ADMIN — INSULIN ASPART 1 UNITS: 100 INJECTION, SOLUTION INTRAVENOUS; SUBCUTANEOUS at 16:40

## 2018-01-01 RX ADMIN — Medication 10 MG: at 22:15

## 2018-01-01 RX ADMIN — Medication 100 MG: at 08:02

## 2018-01-01 RX ADMIN — Medication 1 PACKET: at 09:06

## 2018-01-01 RX ADMIN — LEVETIRACETAM 750 MG: 100 SOLUTION ORAL at 20:11

## 2018-01-01 RX ADMIN — SENNOSIDES AND DOCUSATE SODIUM 2 TABLET: 8.6; 5 TABLET ORAL at 09:37

## 2018-01-01 RX ADMIN — LOSARTAN POTASSIUM 25 MG: 25 TABLET ORAL at 19:27

## 2018-01-01 RX ADMIN — ACETAMINOPHEN 650 MG: 325 SOLUTION ORAL at 08:29

## 2018-01-01 RX ADMIN — SODIUM CHLORIDE 500 ML: 9 INJECTION, SOLUTION INTRAVENOUS at 21:29

## 2018-01-01 RX ADMIN — WARFARIN SODIUM 6 MG: 6 TABLET ORAL at 17:52

## 2018-01-01 RX ADMIN — MULTIPLE VITAMINS W/ MINERALS TAB 1 TABLET: TAB at 08:44

## 2018-01-01 RX ADMIN — Medication 1 TABLET: at 12:42

## 2018-01-01 RX ADMIN — TAMSULOSIN HYDROCHLORIDE 0.4 MG: 0.4 CAPSULE ORAL at 17:54

## 2018-01-01 RX ADMIN — ACETAMINOPHEN 650 MG: 325 TABLET, FILM COATED ORAL at 00:55

## 2018-01-01 RX ADMIN — Medication 100 MG: at 09:21

## 2018-01-01 RX ADMIN — INSULIN HUMAN 88 UNITS: 100 INJECTION, SUSPENSION SUBCUTANEOUS at 17:52

## 2018-01-01 RX ADMIN — CEFEPIME HYDROCHLORIDE 2 G: 2 INJECTION, POWDER, FOR SOLUTION INTRAVENOUS at 20:29

## 2018-01-01 RX ADMIN — HUMAN INSULIN 8.5 UNITS/HR: 100 INJECTION, SOLUTION SUBCUTANEOUS at 22:08

## 2018-01-01 RX ADMIN — ACETAMINOPHEN 650 MG: 325 TABLET, FILM COATED ORAL at 04:39

## 2018-01-01 RX ADMIN — LORATADINE 10 MG: 5 SOLUTION ORAL at 08:28

## 2018-01-01 RX ADMIN — LIDOCAINE HYDROCHLORIDE 15 ML: 20 SOLUTION ORAL; TOPICAL at 14:17

## 2018-01-01 RX ADMIN — LINEZOLID 600 MG: 2 INJECTION, SOLUTION INTRAVENOUS at 16:07

## 2018-01-01 RX ADMIN — MICONAZOLE NITRATE: 2 POWDER TOPICAL at 08:05

## 2018-01-01 RX ADMIN — LOSARTAN POTASSIUM 25 MG: 25 TABLET, FILM COATED ORAL at 10:34

## 2018-01-01 RX ADMIN — PANTOPRAZOLE SODIUM 40 MG: 40 TABLET, DELAYED RELEASE ORAL at 08:59

## 2018-01-01 RX ADMIN — METOPROLOL SUCCINATE 25 MG: 25 TABLET, EXTENDED RELEASE ORAL at 00:48

## 2018-01-01 RX ADMIN — LINEZOLID 600 MG: 600 TABLET, FILM COATED ORAL at 20:01

## 2018-01-01 RX ADMIN — PANTOPRAZOLE SODIUM 20 MG: 40 TABLET, DELAYED RELEASE ORAL at 09:22

## 2018-01-01 RX ADMIN — WARFARIN SODIUM 2 MG: 1 TABLET ORAL at 17:54

## 2018-01-01 RX ADMIN — SIMETHICONE CHEW TAB 80 MG 80 MG: 80 TABLET ORAL at 12:30

## 2018-01-01 RX ADMIN — LEVETIRACETAM 750 MG: 100 SOLUTION ORAL at 20:27

## 2018-01-01 RX ADMIN — INSULIN HUMAN 20 UNITS: 100 INJECTION, SUSPENSION SUBCUTANEOUS at 08:30

## 2018-01-01 RX ADMIN — MODAFINIL 100 MG: 100 TABLET ORAL at 08:59

## 2018-01-01 RX ADMIN — Medication 1 MG: at 00:18

## 2018-01-01 RX ADMIN — LEVETIRACETAM 750 MG: 750 TABLET, FILM COATED ORAL at 20:33

## 2018-01-01 RX ADMIN — LINEZOLID 600 MG: 600 TABLET, FILM COATED ORAL at 08:40

## 2018-01-01 RX ADMIN — FINASTERIDE 5 MG: 5 TABLET, FILM COATED ORAL at 09:41

## 2018-01-01 RX ADMIN — LEVETIRACETAM 500 MG: 100 SOLUTION ORAL at 20:53

## 2018-01-01 RX ADMIN — SENNOSIDES AND DOCUSATE SODIUM 2 TABLET: 8.6; 5 TABLET ORAL at 08:58

## 2018-01-01 RX ADMIN — METRONIDAZOLE 250 MG: 250 TABLET ORAL at 17:56

## 2018-01-01 RX ADMIN — FINASTERIDE 5 MG: 5 TABLET, FILM COATED ORAL at 11:50

## 2018-01-01 RX ADMIN — Medication 100 MG: at 11:50

## 2018-01-01 RX ADMIN — ACETAMINOPHEN 650 MG: 325 TABLET, FILM COATED ORAL at 16:01

## 2018-01-01 RX ADMIN — SULFAMETHOXAZOLE AND TRIMETHOPRIM 160 MG: 200; 40 SUSPENSION ORAL at 19:42

## 2018-01-01 RX ADMIN — INSULIN ASPART 3 UNITS: 100 INJECTION, SOLUTION INTRAVENOUS; SUBCUTANEOUS at 00:16

## 2018-01-01 RX ADMIN — LINEZOLID 600 MG: 600 TABLET, FILM COATED ORAL at 11:50

## 2018-01-01 RX ADMIN — HUMAN INSULIN 8 UNITS/HR: 100 INJECTION, SOLUTION SUBCUTANEOUS at 02:15

## 2018-01-01 RX ADMIN — LEVETIRACETAM 750 MG: 100 SOLUTION ORAL at 07:36

## 2018-01-01 RX ADMIN — WARFARIN SODIUM 3 MG: 3 TABLET ORAL at 00:48

## 2018-01-01 RX ADMIN — Medication 100 MG: at 08:44

## 2018-01-01 RX ADMIN — HUMAN INSULIN 14.5 UNITS/HR: 100 INJECTION, SOLUTION SUBCUTANEOUS at 03:14

## 2018-01-01 RX ADMIN — ACETAMINOPHEN 325 MG: 325 TABLET, FILM COATED ORAL at 23:19

## 2018-01-01 RX ADMIN — Medication 1 PACKET: at 20:36

## 2018-01-01 RX ADMIN — Medication 5 MG: at 00:48

## 2018-01-01 RX ADMIN — LOSARTAN POTASSIUM 25 MG: 25 TABLET, FILM COATED ORAL at 09:20

## 2018-01-01 RX ADMIN — WARFARIN SODIUM 5 MG: 5 TABLET ORAL at 16:15

## 2018-01-01 RX ADMIN — LORATADINE 10 MG: 5 SOLUTION ORAL at 08:46

## 2018-01-01 RX ADMIN — LEVETIRACETAM 750 MG: 750 TABLET, FILM COATED ORAL at 13:49

## 2018-01-01 RX ADMIN — VANCOMYCIN HYDROCHLORIDE 2250 MG: 1 INJECTION, POWDER, LYOPHILIZED, FOR SOLUTION INTRAVENOUS at 18:17

## 2018-01-01 RX ADMIN — Medication 1 PACKET: at 09:00

## 2018-01-01 RX ADMIN — Medication 100 MG: at 09:56

## 2018-01-01 RX ADMIN — FINASTERIDE 5 MG: 5 TABLET, FILM COATED ORAL at 16:11

## 2018-01-01 RX ADMIN — TAMSULOSIN HYDROCHLORIDE 0.8 MG: 0.4 CAPSULE ORAL at 09:38

## 2018-01-01 RX ADMIN — ACETAMINOPHEN 650 MG: 325 TABLET, FILM COATED ORAL at 11:35

## 2018-01-01 RX ADMIN — SODIUM CHLORIDE, POTASSIUM CHLORIDE, SODIUM LACTATE AND CALCIUM CHLORIDE 500 ML: 600; 310; 30; 20 INJECTION, SOLUTION INTRAVENOUS at 00:54

## 2018-01-01 RX ADMIN — LORAZEPAM 1 MG: 2 INJECTION INTRAMUSCULAR; INTRAVENOUS at 21:53

## 2018-01-01 RX ADMIN — INSULIN HUMAN 20 UNITS: 100 INJECTION, SUSPENSION SUBCUTANEOUS at 09:25

## 2018-01-01 RX ADMIN — Medication 1 PACKET: at 16:15

## 2018-01-01 RX ADMIN — MICONAZOLE NITRATE: 2 POWDER TOPICAL at 20:34

## 2018-01-01 RX ADMIN — KETOTIFEN FUMARATE 1 DROP: 0.35 SOLUTION/ DROPS OPHTHALMIC at 09:57

## 2018-01-01 RX ADMIN — KETOTIFEN FUMARATE 1 DROP: 0.35 SOLUTION/ DROPS OPHTHALMIC at 09:07

## 2018-01-01 RX ADMIN — Medication 100 MG: at 09:41

## 2018-01-01 RX ADMIN — TAMSULOSIN HYDROCHLORIDE 0.8 MG: 0.4 CAPSULE ORAL at 08:43

## 2018-01-01 RX ADMIN — METRONIDAZOLE 500 MG: 500 TABLET ORAL at 09:08

## 2018-01-01 RX ADMIN — CEFEPIME HYDROCHLORIDE 2 G: 2 INJECTION, POWDER, FOR SOLUTION INTRAVENOUS at 21:59

## 2018-01-01 RX ADMIN — POTASSIUM & SODIUM PHOSPHATES POWDER PACK 280-160-250 MG 1 PACKET: 280-160-250 PACK at 15:06

## 2018-01-01 RX ADMIN — MODAFINIL 100 MG: 100 TABLET ORAL at 09:22

## 2018-01-01 RX ADMIN — PANTOPRAZOLE SODIUM 20 MG: 20 TABLET, DELAYED RELEASE ORAL at 17:43

## 2018-01-01 RX ADMIN — MULTIPLE VITAMINS W/ MINERALS TAB 1 TABLET: TAB at 09:01

## 2018-01-01 RX ADMIN — METRONIDAZOLE 500 MG: 500 TABLET ORAL at 17:54

## 2018-01-01 RX ADMIN — FINASTERIDE 5 MG: 5 TABLET, FILM COATED ORAL at 09:01

## 2018-01-01 RX ADMIN — ACETAMINOPHEN 650 MG: 325 SOLUTION ORAL at 23:48

## 2018-01-01 RX ADMIN — METOPROLOL SUCCINATE 50 MG: 50 TABLET, EXTENDED RELEASE ORAL at 09:31

## 2018-01-01 RX ADMIN — Medication 1 PACKET: at 20:30

## 2018-01-01 RX ADMIN — ASPIRIN 325 MG ORAL TABLET 325 MG: 325 PILL ORAL at 16:04

## 2018-01-01 RX ADMIN — Medication 100 MG: at 08:38

## 2018-01-01 RX ADMIN — ONDANSETRON 4 MG: 2 INJECTION INTRAMUSCULAR; INTRAVENOUS at 03:15

## 2018-01-01 RX ADMIN — Medication 100 MG: at 09:25

## 2018-01-01 RX ADMIN — LINEZOLID 600 MG: 600 INJECTION, SOLUTION INTRAVENOUS at 15:50

## 2018-01-01 RX ADMIN — WARFARIN SODIUM 4 MG: 4 TABLET ORAL at 17:45

## 2018-01-01 RX ADMIN — PANTOPRAZOLE SODIUM 20 MG: 20 TABLET, DELAYED RELEASE ORAL at 09:23

## 2018-01-01 RX ADMIN — Medication 10 MG: at 22:30

## 2018-01-01 RX ADMIN — DEXTROSE MONOHYDRATE: 50 INJECTION, SOLUTION INTRAVENOUS at 20:40

## 2018-01-01 RX ADMIN — LINEZOLID 600 MG: 600 INJECTION, SOLUTION INTRAVENOUS at 23:07

## 2018-01-01 RX ADMIN — ONDANSETRON 4 MG: 4 TABLET, ORALLY DISINTEGRATING ORAL at 09:25

## 2018-01-01 RX ADMIN — ACETAMINOPHEN 650 MG: 325 SOLUTION ORAL at 23:58

## 2018-01-01 RX ADMIN — INSULIN ASPART 2 UNITS: 100 INJECTION, SOLUTION INTRAVENOUS; SUBCUTANEOUS at 08:48

## 2018-01-01 RX ADMIN — POTASSIUM & SODIUM PHOSPHATES POWDER PACK 280-160-250 MG 1 PACKET: 280-160-250 PACK at 08:24

## 2018-01-01 RX ADMIN — PANTOPRAZOLE SODIUM 40 MG: 40 TABLET, DELAYED RELEASE ORAL at 08:53

## 2018-01-01 RX ADMIN — TAMSULOSIN HYDROCHLORIDE 0.4 MG: 0.4 CAPSULE ORAL at 17:43

## 2018-01-01 RX ADMIN — FINASTERIDE 5 MG: 5 TABLET, FILM COATED ORAL at 08:38

## 2018-01-01 RX ADMIN — Medication 1 PACKET: at 13:25

## 2018-01-01 RX ADMIN — HUMAN INSULIN 8 UNITS/HR: 100 INJECTION, SOLUTION SUBCUTANEOUS at 10:52

## 2018-01-01 RX ADMIN — ACETAMINOPHEN 650 MG: 325 TABLET, FILM COATED ORAL at 22:48

## 2018-01-01 RX ADMIN — Medication 81 MG: at 09:00

## 2018-01-01 RX ADMIN — Medication 1 PACKET: at 14:07

## 2018-01-01 RX ADMIN — FINASTERIDE 5 MG: 5 TABLET, FILM COATED ORAL at 08:54

## 2018-01-01 RX ADMIN — Medication 1 PACKET: at 14:10

## 2018-01-01 RX ADMIN — METRONIDAZOLE 500 MG: 500 TABLET ORAL at 08:29

## 2018-01-01 RX ADMIN — HUMAN INSULIN 9 UNITS/HR: 100 INJECTION, SOLUTION SUBCUTANEOUS at 21:19

## 2018-01-01 RX ADMIN — PANTOPRAZOLE SODIUM 20 MG: 20 TABLET, DELAYED RELEASE ORAL at 08:07

## 2018-01-01 RX ADMIN — AMIODARONE HYDROCHLORIDE 150 MG: 1.5 INJECTION, SOLUTION INTRAVENOUS at 04:51

## 2018-01-01 RX ADMIN — Medication 1 PACKET: at 20:41

## 2018-01-01 RX ADMIN — HEPARIN SODIUM 1600 UNITS/HR: 10000 INJECTION, SOLUTION INTRAVENOUS at 07:27

## 2018-01-01 RX ADMIN — LEVETIRACETAM 750 MG: 750 TABLET, FILM COATED ORAL at 21:52

## 2018-01-01 RX ADMIN — PANTOPRAZOLE SODIUM 20 MG: 20 TABLET, DELAYED RELEASE ORAL at 08:01

## 2018-01-01 RX ADMIN — PANTOPRAZOLE SODIUM 20 MG: 40 TABLET, DELAYED RELEASE ORAL at 13:06

## 2018-01-01 RX ADMIN — SIMETHICONE CHEW TAB 80 MG 80 MG: 80 TABLET ORAL at 21:51

## 2018-01-01 RX ADMIN — Medication 1 PACKET: at 19:59

## 2018-01-01 RX ADMIN — CEFTRIAXONE 1 G: 1 INJECTION, POWDER, FOR SOLUTION INTRAMUSCULAR; INTRAVENOUS at 13:05

## 2018-01-01 RX ADMIN — ACETAMINOPHEN 650 MG: 325 TABLET, FILM COATED ORAL at 04:41

## 2018-01-01 RX ADMIN — METRONIDAZOLE 500 MG: 500 TABLET ORAL at 23:29

## 2018-01-01 RX ADMIN — WARFARIN SODIUM 7 MG: 6 TABLET ORAL at 18:30

## 2018-01-01 RX ADMIN — HUMAN INSULIN 10 UNITS/HR: 100 INJECTION, SOLUTION SUBCUTANEOUS at 09:33

## 2018-01-01 RX ADMIN — SIMETHICONE CHEW TAB 80 MG 80 MG: 80 TABLET ORAL at 15:50

## 2018-01-01 RX ADMIN — Medication 1 PACKET: at 09:27

## 2018-01-01 RX ADMIN — POTASSIUM & SODIUM PHOSPHATES POWDER PACK 280-160-250 MG 1 PACKET: 280-160-250 PACK at 16:23

## 2018-01-01 RX ADMIN — PIPERACILLIN SODIUM AND TAZOBACTAM SODIUM 3.38 G: 3; .375 INJECTION, POWDER, LYOPHILIZED, FOR SOLUTION INTRAVENOUS at 13:05

## 2018-01-01 RX ADMIN — PANTOPRAZOLE SODIUM 20 MG: 40 TABLET, DELAYED RELEASE ORAL at 09:01

## 2018-01-01 RX ADMIN — ACETAMINOPHEN 650 MG: 325 SOLUTION ORAL at 13:25

## 2018-01-01 RX ADMIN — Medication 1 PACKET: at 20:59

## 2018-01-01 RX ADMIN — SODIUM CHLORIDE 250 ML: 9 INJECTION, SOLUTION INTRAVENOUS at 10:17

## 2018-01-01 RX ADMIN — ACETAMINOPHEN 650 MG: 325 SOLUTION ORAL at 00:16

## 2018-01-01 RX ADMIN — SIMETHICONE CHEW TAB 80 MG 80 MG: 80 TABLET ORAL at 19:45

## 2018-01-01 RX ADMIN — LEVETIRACETAM 750 MG: 750 TABLET, FILM COATED ORAL at 09:31

## 2018-01-01 RX ADMIN — ONDANSETRON 4 MG: 2 INJECTION INTRAMUSCULAR; INTRAVENOUS at 21:32

## 2018-01-01 RX ADMIN — FINASTERIDE 5 MG: 5 TABLET, FILM COATED ORAL at 08:24

## 2018-01-01 RX ADMIN — FINASTERIDE 5 MG: 5 TABLET, FILM COATED ORAL at 09:38

## 2018-01-01 RX ADMIN — ACETAMINOPHEN 325 MG: 325 TABLET, FILM COATED ORAL at 16:09

## 2018-01-01 RX ADMIN — PANTOPRAZOLE SODIUM 20 MG: 40 TABLET, DELAYED RELEASE ORAL at 10:03

## 2018-01-01 RX ADMIN — MULTIVITAMIN 15 ML: LIQUID ORAL at 08:11

## 2018-01-01 RX ADMIN — POTASSIUM & SODIUM PHOSPHATES POWDER PACK 280-160-250 MG 1 PACKET: 280-160-250 PACK at 20:29

## 2018-01-01 RX ADMIN — LEVETIRACETAM 750 MG: 100 SOLUTION ORAL at 20:36

## 2018-01-01 RX ADMIN — LOSARTAN POTASSIUM 25 MG: 25 TABLET ORAL at 09:37

## 2018-01-01 RX ADMIN — Medication 0.2 MG: at 18:33

## 2018-01-01 RX ADMIN — LEVETIRACETAM 750 MG: 100 SOLUTION ORAL at 00:53

## 2018-01-01 RX ADMIN — IOPAMIDOL 75 ML: 755 INJECTION, SOLUTION INTRAVENOUS at 22:32

## 2018-01-01 RX ADMIN — LORATADINE 10 MG: 5 SOLUTION ORAL at 09:22

## 2018-01-01 RX ADMIN — LEVETIRACETAM 750 MG: 100 SOLUTION ORAL at 10:37

## 2018-01-01 RX ADMIN — INSULIN HUMAN 88 UNITS: 100 INJECTION, SUSPENSION SUBCUTANEOUS at 18:15

## 2018-01-01 RX ADMIN — LOSARTAN POTASSIUM 25 MG: 25 TABLET ORAL at 08:38

## 2018-01-01 RX ADMIN — LINEZOLID 600 MG: 600 TABLET, FILM COATED ORAL at 23:44

## 2018-01-01 RX ADMIN — LINEZOLID 600 MG: 600 TABLET, FILM COATED ORAL at 20:58

## 2018-01-01 RX ADMIN — CEFTRIAXONE SODIUM 1 G: 10 INJECTION, POWDER, FOR SOLUTION INTRAVENOUS at 20:36

## 2018-01-01 RX ADMIN — LEVETIRACETAM 750 MG: 100 SOLUTION ORAL at 19:42

## 2018-01-01 RX ADMIN — LEVETIRACETAM 750 MG: 100 SOLUTION ORAL at 19:59

## 2018-01-01 RX ADMIN — BENZONATATE 100 MG: 100 CAPSULE, LIQUID FILLED ORAL at 23:09

## 2018-01-01 RX ADMIN — PIPERACILLIN SODIUM AND TAZOBACTAM SODIUM 3.38 G: 3; .375 INJECTION, POWDER, LYOPHILIZED, FOR SOLUTION INTRAVENOUS at 05:16

## 2018-01-01 RX ADMIN — Medication 1 PACKET: at 15:07

## 2018-01-01 RX ADMIN — SODIUM CHLORIDE 250 ML: 9 INJECTION, SOLUTION INTRAVENOUS at 22:45

## 2018-01-01 RX ADMIN — PANTOPRAZOLE SODIUM 20 MG: 40 TABLET, DELAYED RELEASE ORAL at 09:15

## 2018-01-01 RX ADMIN — HEPARIN SODIUM 1450 UNITS/HR: 10000 INJECTION, SOLUTION INTRAVENOUS at 17:32

## 2018-01-01 RX ADMIN — LORATADINE 10 MG: 10 TABLET ORAL at 08:38

## 2018-01-01 RX ADMIN — LOSARTAN POTASSIUM 25 MG: 25 TABLET ORAL at 08:06

## 2018-01-01 RX ADMIN — LOSARTAN POTASSIUM 12.5 MG: 100 TABLET, FILM COATED ORAL at 08:47

## 2018-01-01 RX ADMIN — METOPROLOL SUCCINATE 25 MG: 25 TABLET, EXTENDED RELEASE ORAL at 21:51

## 2018-01-01 RX ADMIN — PANTOPRAZOLE SODIUM 20 MG: 40 TABLET, DELAYED RELEASE ORAL at 08:40

## 2018-01-01 RX ADMIN — HEPARIN SODIUM 750 UNITS/HR: 10000 INJECTION, SOLUTION INTRAVENOUS at 06:05

## 2018-01-01 RX ADMIN — Medication 1 PACKET: at 09:17

## 2018-01-01 RX ADMIN — SIMETHICONE CHEW TAB 80 MG 80 MG: 80 TABLET ORAL at 13:02

## 2018-01-01 RX ADMIN — CEPHALEXIN 500 MG: 250 POWDER, FOR SUSPENSION ORAL at 01:20

## 2018-01-01 RX ADMIN — LEVETIRACETAM 750 MG: 100 SOLUTION ORAL at 20:59

## 2018-01-01 RX ADMIN — Medication 100 MG: at 09:38

## 2018-01-01 RX ADMIN — SIMETHICONE CHEW TAB 80 MG 80 MG: 80 TABLET ORAL at 12:58

## 2018-01-01 RX ADMIN — LEVETIRACETAM 750 MG: 100 INJECTION, SOLUTION INTRAVENOUS at 20:06

## 2018-01-01 RX ADMIN — Medication 5 MG: at 22:11

## 2018-01-01 RX ADMIN — ONDANSETRON 4 MG: 2 INJECTION INTRAMUSCULAR; INTRAVENOUS at 04:52

## 2018-01-01 RX ADMIN — BISACODYL 10 MG: 10 SUPPOSITORY RECTAL at 17:44

## 2018-01-01 RX ADMIN — LOSARTAN POTASSIUM 25 MG: 25 TABLET, FILM COATED ORAL at 15:42

## 2018-01-01 RX ADMIN — ASPIRIN 81 MG CHEWABLE TABLET 81 MG: 81 TABLET CHEWABLE at 09:38

## 2018-01-01 RX ADMIN — LINEZOLID 600 MG: 600 TABLET, FILM COATED ORAL at 01:03

## 2018-01-01 RX ADMIN — SIMETHICONE CHEW TAB 80 MG 80 MG: 80 TABLET ORAL at 12:40

## 2018-01-01 RX ADMIN — MULTIVITAMIN 15 ML: LIQUID ORAL at 09:41

## 2018-01-01 RX ADMIN — HEPARIN SODIUM 950 UNITS/HR: 10000 INJECTION, SOLUTION INTRAVENOUS at 02:27

## 2018-01-01 RX ADMIN — Medication 100 MG: at 08:06

## 2018-01-01 RX ADMIN — SULFAMETHOXAZOLE AND TRIMETHOPRIM 160 MG: 200; 40 SUSPENSION ORAL at 08:10

## 2018-01-01 RX ADMIN — ASPIRIN 81 MG: 81 TABLET, COATED ORAL at 08:06

## 2018-01-01 RX ADMIN — SULFAMETHOXAZOLE AND TRIMETHOPRIM 160 MG: 200; 40 SUSPENSION ORAL at 20:41

## 2018-01-01 RX ADMIN — INSULIN HUMAN 88 UNITS: 100 INJECTION, SUSPENSION SUBCUTANEOUS at 18:01

## 2018-01-01 RX ADMIN — HUMAN INSULIN 10 UNITS/HR: 100 INJECTION, SOLUTION SUBCUTANEOUS at 02:03

## 2018-01-01 RX ADMIN — DOXAZOSIN MESYLATE 2 MG: 2 TABLET ORAL at 09:41

## 2018-01-01 RX ADMIN — ACETAMINOPHEN 650 MG: 325 TABLET, FILM COATED ORAL at 01:03

## 2018-01-01 RX ADMIN — SIMETHICONE CHEW TAB 80 MG 80 MG: 80 TABLET ORAL at 21:02

## 2018-01-01 RX ADMIN — SIMETHICONE CHEW TAB 80 MG 80 MG: 80 TABLET ORAL at 16:04

## 2018-01-01 RX ADMIN — HEPARIN SODIUM 750 UNITS/HR: 10000 INJECTION, SOLUTION INTRAVENOUS at 18:15

## 2018-01-01 RX ADMIN — LEVETIRACETAM 750 MG: 100 SOLUTION ORAL at 21:04

## 2018-01-01 RX ADMIN — LOSARTAN POTASSIUM 25 MG: 25 TABLET, FILM COATED ORAL at 11:50

## 2018-01-01 RX ADMIN — Medication 81 MG: at 08:57

## 2018-01-01 ASSESSMENT — ACTIVITIES OF DAILY LIVING (ADL)
ADLS_ACUITY_SCORE: 40
ADLS_ACUITY_SCORE: 31
ADLS_ACUITY_SCORE: 40
ADLS_ACUITY_SCORE: 31
SWALLOWING: 2-->DIFFICULTY SWALLOWING FOODS
ADLS_ACUITY_SCORE: 40
TOILETING: 4-->COMPLETELY DEPENDENT
ADLS_ACUITY_SCORE: 40
WHICH_OF_THE_ABOVE_FUNCTIONAL_RISKS_HAD_A_RECENT_ONSET_OR_CHANGE?: AMBULATION;TRANSFERRING
SWALLOWING: 0 - SWALLOWS FOODS/LIQUIDS WITHOUT DIFFICULTY
AMBULATION: 4-->COMPLETELY DEPENDENT
TOILETING: 4 - COMPLETELY DEPENDENT
ADLS_ACUITY_SCORE: 40
ADLS_ACUITY_SCORE: 40
COMMUNICATION: 0 - UNDERSTANDS/COMMUNICATES WITHOUT DIFFICULTY
ADLS_ACUITY_SCORE: 40
SWALLOWING: 0-->SWALLOWS FOODS/LIQUIDS WITHOUT DIFFICULTY
ADLS_ACUITY_SCORE: 40
TOILETING: 4-->COMPLETELY DEPENDENT
ADLS_ACUITY_SCORE: 40
ADLS_ACUITY_SCORE: 44
ADLS_ACUITY_SCORE: 40
ADLS_ACUITY_SCORE: 39
SWALLOWING: 0-->SWALLOWS FOODS/LIQUIDS WITHOUT DIFFICULTY
DRESS: 4-->COMPLETELY DEPENDENT
ADLS_ACUITY_SCORE: 44
ADLS_ACUITY_SCORE: 40
ADLS_ACUITY_SCORE: 42
BATHING: 4 - COMPLETELY DEPENDENT
ADLS_ACUITY_SCORE: 40
ADLS_ACUITY_SCORE: 40
ADLS_ACUITY_SCORE: 42
ADLS_ACUITY_SCORE: 40
ADLS_ACUITY_SCORE: 40
ADLS_ACUITY_SCORE: 42
ADLS_ACUITY_SCORE: 35
ADLS_ACUITY_SCORE: 39
TOILETING: 4-->COMPLETELY DEPENDENT
COGNITION: 0 - NO COGNITION ISSUES REPORTED
FALL_HISTORY_WITHIN_LAST_SIX_MONTHS: NO
ADLS_ACUITY_SCORE: 42
ADLS_ACUITY_SCORE: 40
ADLS_ACUITY_SCORE: 40
FALL_HISTORY_WITHIN_LAST_SIX_MONTHS: NO
COGNITION: 1 - ATTENTION OR MEMORY DEFICITS
RETIRED_EATING: 2-->ASSISTIVE PERSON
ADLS_ACUITY_SCORE: 40
ADLS_ACUITY_SCORE: 40
DRESS: 4 - COMPLETELY DEPENDENT
RETIRED_COMMUNICATION: 3-->UNABLE TO SPEAK (NOT RELATED TO LANGUAGE BARRIER)
ADLS_ACUITY_SCORE: 40
AMBULATION: 4-->COMPLETELY DEPENDENT
RETIRED_COMMUNICATION: 0-->UNDERSTANDS/COMMUNICATES WITHOUT DIFFICULTY
ADLS_ACUITY_SCORE: 35
COGNITION: 0 - NO COGNITION ISSUES REPORTED
ADLS_ACUITY_SCORE: 40
BATHING: 4-->COMPLETELY DEPENDENT
TRANSFERRING: 4-->COMPLETELY DEPENDENT
DRESS: 4-->COMPLETELY DEPENDENT
DRESS: 4-->COMPLETELY DEPENDENT
TOILETING: 4-->COMPLETELY DEPENDENT
RETIRED_EATING: 2-->ASSISTIVE PERSON
TRANSFERRING: 3 - ASSISTIVE EQUIPMENT AND PERSON
FALL_HISTORY_WITHIN_LAST_SIX_MONTHS: NO
ADLS_ACUITY_SCORE: 42
AMBULATION: 3-->ASSISTIVE EQUIPMENT AND PERSON
ADLS_ACUITY_SCORE: 40
ADLS_ACUITY_SCORE: 35
SWALLOWING: 0-->SWALLOWS FOODS/LIQUIDS WITHOUT DIFFICULTY
TRANSFERRING: 4-->COMPLETELY DEPENDENT
DRESS: 4-->COMPLETELY DEPENDENT
ADLS_ACUITY_SCORE: 40
ADLS_ACUITY_SCORE: 40
RETIRED_EATING: 4-->COMPLETELY DEPENDENT
TRANSFERRING: 4-->COMPLETELY DEPENDENT
ADLS_ACUITY_SCORE: 40
TRANSFERRING: 3-->ASSISTIVE EQUIPMENT AND PERSON
ADLS_ACUITY_SCORE: 42
BATHING: 4-->COMPLETELY DEPENDENT
COGNITION: 0 - NO COGNITION ISSUES REPORTED
EATING: 2 - ASSISTIVE PERSON
ADLS_ACUITY_SCORE: 40
RETIRED_EATING: 2-->ASSISTIVE PERSON
ADLS_ACUITY_SCORE: 40
ADLS_ACUITY_SCORE: 44
FALL_HISTORY_WITHIN_LAST_SIX_MONTHS: NO
RETIRED_COMMUNICATION: 2-->DIFFICULTY SPEAKING (NOT RELATED TO LANGUAGE BARRIER)
AMBULATION: 3 - ASSISTIVE EQUIPMENT AND PERSON
ADLS_ACUITY_SCORE: 31
BATHING: 4-->COMPLETELY DEPENDENT
RETIRED_COMMUNICATION: 0-->UNDERSTANDS/COMMUNICATES WITHOUT DIFFICULTY
ADLS_ACUITY_SCORE: 40
AMBULATION: 4-->COMPLETELY DEPENDENT
BATHING: 4-->COMPLETELY DEPENDENT
ADLS_ACUITY_SCORE: 40
ADLS_ACUITY_SCORE: 44
ADLS_ACUITY_SCORE: 31

## 2018-01-01 ASSESSMENT — ENCOUNTER SYMPTOMS
ABDOMINAL PAIN: 0
ACTIVITY CHANGE: 0
NECK STIFFNESS: 0
COLOR CHANGE: 0
HEMATURIA: 0
WHEEZING: 0
BRUISES/BLEEDS EASILY: 1
DECREASED CONCENTRATION: 0
DYSURIA: 0
SHORTNESS OF BREATH: 1
SHORTNESS OF BREATH: 0
CHILLS: 0
WEAKNESS: 1
NAUSEA: 0
DIZZINESS: 0
CONFUSION: 0
FEVER: 0
ABDOMINAL PAIN: 0
ABDOMINAL DISTENTION: 1
RECTAL PAIN: 1
HEMATURIA: 1
APPETITE CHANGE: 0
SHORTNESS OF BREATH: 0
FEVER: 0
ABDOMINAL PAIN: 1
DIARRHEA: 0
VOMITING: 0
HEADACHES: 0
SHORTNESS OF BREATH: 0
FEVER: 0
COUGH: 1
COLOR CHANGE: 0
NAUSEA: 1
DYSURIA: 1
FEVER: 0
EYE REDNESS: 0
FEVER: 0
NAUSEA: 0
FLANK PAIN: 0
ARTHRALGIAS: 0
WEAKNESS: 1
ABDOMINAL DISTENTION: 1
SHORTNESS OF BREATH: 0
FEVER: 0
CONFUSION: 1
SHORTNESS OF BREATH: 0
SORE THROAT: 0
CONSTIPATION: 0
ARTHRALGIAS: 0
SORE THROAT: 0
FEVER: 0
COUGH: 1
DIFFICULTY URINATING: 0
SHORTNESS OF BREATH: 0
RHINORRHEA: 0
SORE THROAT: 1
DYSPHORIC MOOD: 0
VOMITING: 0
DIARRHEA: 0
BLOOD IN STOOL: 0
DIFFICULTY URINATING: 1
COUGH: 0
VOMITING: 0
NECK STIFFNESS: 0
CONSTIPATION: 1
DIARRHEA: 0
COUGH: 1
FREQUENCY: 0
DIFFICULTY URINATING: 1
FEVER: 0
COUGH: 0
HEADACHES: 1
NECK PAIN: 0
RHINORRHEA: 0
EYE REDNESS: 0
ABDOMINAL PAIN: 1
CHILLS: 0
COUGH: 1
COUGH: 1
DYSURIA: 0
HEADACHES: 1
BACK PAIN: 0
SHORTNESS OF BREATH: 0
ABDOMINAL PAIN: 1
FEVER: 0
SHORTNESS OF BREATH: 0

## 2018-01-01 ASSESSMENT — VISUAL ACUITY
OU: NOT TESTABLE
OU: OTHER (SEE COMMENT)
OU: BASELINE
OU: OTHER (SEE COMMENT)
OU: OTHER (SEE COMMENT)
OU: NOT TESTABLE
OU: NOT TESTABLE
OU: OTHER (SEE COMMENT)
OU: BASELINE
OU: BASELINE
OU: NOT TESTABLE
OU: OTHER (SEE COMMENT)
OU: OTHER (SEE COMMENT)
OU: NOT TESTABLE
OU: NOT TESTABLE
OU: OTHER (SEE COMMENT)
OU: OTHER (SEE COMMENT)
OU: NOT TESTABLE
OU: OTHER (SEE COMMENT)
OU: OTHER (SEE COMMENT)
OU: NOT TESTABLE
OU: NOT TESTABLE
OU: OTHER (SEE COMMENT)
OU: BASELINE
OU: NOT TESTABLE
OU: NOT TESTABLE
OU: OTHER (SEE COMMENT)
OU: NOT TESTABLE
OU: BASELINE
OU: NOT TESTABLE
OU: OTHER (SEE COMMENT)
OU: NOT TESTABLE
OU: BASELINE
OU: NOT TESTABLE
OU: OTHER (SEE COMMENT)
OU: OTHER (SEE COMMENT)
OU: NOT TESTABLE
OU: BASELINE
OU: NOT TESTABLE
OU: NOT TESTABLE
OU: OTHER (SEE COMMENT)
OU: NOT TESTABLE
OU: BASELINE
OU: NOT TESTABLE
OU: NOT TESTABLE
OU: BASELINE
OU: NOT TESTABLE
OU: NOT TESTABLE
OU: OTHER (SEE COMMENT)

## 2018-01-01 ASSESSMENT — PAIN SCALES - GENERAL
PAINLEVEL: NO PAIN (0)
PAINLEVEL: NO PAIN (0)
PAINLEVEL: EXTREME PAIN (9)
PAINLEVEL: NO PAIN (0)
PAINLEVEL: NO PAIN (0)

## 2018-01-01 ASSESSMENT — PAIN DESCRIPTION - DESCRIPTORS
DESCRIPTORS: ACHING
DESCRIPTORS: ACHING;HEADACHE
DESCRIPTORS: HEADACHE
DESCRIPTORS: ACHING
DESCRIPTORS: DISCOMFORT

## 2018-01-02 NOTE — TELEPHONE ENCOUNTER
Call recevied from pt's daughter wondering when Dr. Dill is scheduled to see her dad.  Writer inform pt's daughter [facility] is closed, and I will send a message to our nurses to follow up with her.  Antionette (daughter) # [phone]      Shemar Barnes

## 2018-01-02 NOTE — MR AVS SNAPSHOT
Sohan [facility]   1/2/2018   Anticoagulation Therapy Visit    Description:  67 year old male   Provider:  James Nickerson, RN   [facility]           INR as of 1/2/2018     Today's INR 2.3      Anticoagulation Summary as of 1/2/2018     INR goal    Prior goal 1.8-2.5   Today's INR 2.3   Full instructions 3 mg on Mon; 4.5 mg all other days   Next INR check 1/8/2018    Indications   LVAD (left ventricular assist device) present [Z95.811]  Long-term (current) use of anticoagulants [Z79.01] [Z79.01]         January 2018 Details    Sun Mon Tue Wed Thu Fri Sat      1               2      4.5 mg   See details      3      4.5 mg         4      4.5 mg         5      4.5 mg         6      4.5 mg           7      4.5 mg         8            9               10               11               12               13                 14               15               16               17               18               19               20                 21               22               23               24               25               26               27                 28               29               30               31                   Date Details   01/02 This INR check       Date of next INR:  1/8/2018         How to take your warfarin dose     To take:  3 mg Take 1 of the 3 mg tablets.    To take:  4.5 mg Take 1.5 of the 3 mg tablets.

## 2018-01-02 NOTE — PROGRESS NOTES
ANTICOAGULATION FOLLOW-UP CLINIC VISIT    Patient Name:  Sohan Rendon  Date:  1/2/2018  Contact Type:  Telephone    SUBJECTIVE:     Patient Findings     Positives No Problem Findings           OBJECTIVE    INR   Date Value Ref Range Status   01/02/2018 2.3  Final     Comment:     Home care INR draw     Chromogenic Factor 10   Date Value Ref Range Status   08/10/2014 24 (L) 70 - 130 % Final     Comment:     Therapeutic Range:  A Chromogenic Factor 10 level of approximately 20-40%   inversely correlates with an INR of 2-3 for patients receiving Warfarin.   Chromogenic Factor 10 levels below 20% indicate an INR greater than 3 and   levels above 40% indicate an INR less than 2.       Factor 2 Assay   Date Value Ref Range Status   07/21/2014 25 (L) 60 - 140 % Final     Comment:     Analyte Specific Reagents (ASRs) are used in many laboratory tests necessary   for   standard medical care and generally do not require FDA approval.  This test   was   developed and its performance characteristics determined by [facility].  It has not been cleared or approved by   the US Food and Drug Administration.         ASSESSMENT / PLAN  INR assessment THER    Recheck INR In: 6 DAYS    INR Location [facility] INR      Anticoagulation Summary as of 1/2/2018     INR goal    Prior goal 1.8-2.5   Today's INR 2.3   Maintenance plan 3 mg (3 mg x 1) on Mon; 4.5 mg (3 mg x 1.5) all other days   Full instructions 3 mg on Mon; 4.5 mg all other days   Weekly total 30 mg   No change documented James Nickerson RN   Plan last modified Blanca Bah RN (12/19/2017)   Next INR check 1/8/2018   Priority INR   Target end date Indefinite    Indications   LVAD (left ventricular assist device) present [Z95.811]  Long-term (current) use of anticoagulants [Z79.01] [Z79.01]         Anticoagulation Episode Summary     INR check location     Preferred lab     Send INR reminders to [facility]     Comments Goal Range is 1.8-2.3  [facility] comes out to draw INR  Sofya Ph [phone]  Daughter Almaz Ph [phone]      Anticoagulation Care Providers     Provider Role Specialty Phone number    Evon Lemons MD Responsible Cardiology [phone]            See the Encounter Report to view Anticoagulation Flowsheet and Dosing Calendar (Go to Encounters tab in chart review, and find the Anticoagulation Therapy Visit)    Spoke with INO Jackson. Gave them their lab results and new warfarin recommendation.  No changes in health, medication, or diet. No missed doses, no falls. No signs or symptoms of bleed or clotting.    James Nickerson, RN

## 2018-01-03 PROBLEM — Z76.89 HEALTH CARE HOME: Status: ACTIVE | Noted: 2017-01-13

## 2018-01-05 NOTE — TELEPHONE ENCOUNTER
Called by patient's daughter about a urinary problem, she is requesting results of urine she dropped off at lab today.   Wondering about abx.     No U/A results in EPIC yet.     Patient does not have fever, n/v or systemic symptoms.   He is taking fluids okay.     Follow up tomorrow with U/A results.   Daughter Almaz requests call back on Friday.     [phone]    Lazara Landaverde MD

## 2018-01-05 NOTE — TELEPHONE ENCOUNTER
[facility] TELEPHONE ENCOUNTER    Chief Complaint   Patient presents with     UTI       Sohan Rendon is a 67 year old  (1950) Daughter called today to wondering about UA.  It is positive.  Last UC was sensitive to Keflex and resistant to Cipro    ASSESSMENT/PLAN  Urinary tract infection with hematuria, site unspecified    Encourage increased fluid intake    - cephALEXin (KEFLEX) 500 MG capsule; Take 1 capsule (500 mg) by mouth 2 times daily    INR to be checked on Monday    CLAIRE Poe CNP

## 2018-01-05 NOTE — TELEPHONE ENCOUNTER
[facility] TELEPHONE ENCOUNTER    Chief Complaint   Patient presents with     Urinary Problem       Sohan Rendon is a 67 year old  (1950),daughter called today to report: her father needed to have his urine checked and she would take him to the ER.    ASSESSMENT/PLAN  dysuria    Went to ER to get CLAIRE Damon CNP

## 2018-01-08 NOTE — PROGRESS NOTES
ANTICOAGULATION FOLLOW-UP CLINIC VISIT    Patient Name:  Sohan Rendon  Date:  1/8/2018  Contact Type:  Telephone    SUBJECTIVE:     Patient Findings     Positives Antibiotic use or infection    Comments LPN reports pt is on keflex as of 1/5, reason for this is unknown.           OBJECTIVE    INR Protime   Date Value Ref Range Status   01/08/2018 2.6 (A) 0.86 - 1.14 Final     Chromogenic Factor 10   Date Value Ref Range Status   08/10/2014 24 (L) 70 - 130 % Final     Comment:     Therapeutic Range:  A Chromogenic Factor 10 level of approximately 20-40%   inversely correlates with an INR of 2-3 for patients receiving Warfarin.   Chromogenic Factor 10 levels below 20% indicate an INR greater than 3 and   levels above 40% indicate an INR less than 2.       Factor 2 Assay   Date Value Ref Range Status   07/21/2014 25 (L) 60 - 140 % Final     Comment:     Analyte Specific Reagents (ASRs) are used in many laboratory tests necessary   for   standard medical care and generally do not require FDA approval.  This test   was   developed and its performance characteristics determined by [facility].  It has not been cleared or approved by   the US Food and Drug Administration.         ASSESSMENT / PLAN  INR assessment SUPRA    Recheck INR In: 1 WEEK    INR Location [facility] INR      Anticoagulation Summary as of 1/8/2018     INR goal    Prior goal 1.8-2.5   Today's INR 2.6!   Maintenance plan 3 mg (3 mg x 1) on Mon; 4.5 mg (3 mg x 1.5) all other days   Full instructions 1/9: 3 mg; Otherwise 3 mg on Mon; 4.5 mg all other days   Weekly total 30 mg   Plan last modified Blanca Bah RN (12/19/2017)   Next INR check 1/15/2018   Priority INR   Target end date Indefinite    Indications   LVAD (left ventricular assist device) present [Z95.811]  Long-term (current) use of anticoagulants [Z79.01] [Z79.01]         Anticoagulation Episode Summary     INR check location     Preferred  lab     Send INR reminders to [facility]    Comments Goal Range is 1.8-2.3  [facility] comes out to draw INR  Sofya Ph [phone]  Daughter Almaz Ph [phone]      Anticoagulation Care Providers     Provider Role Specialty Phone number    Evon Lemons MD Responsible Cardiology [phone]            See the Encounter Report to view Anticoagulation Flowsheet and Dosing Calendar (Go to Encounters tab in chart review, and find the Anticoagulation Therapy Visit)    Left message for patient's nurse Asha with dosing recommendations. Asked nurse to call back to report any missed doses, falls, signs and symptoms of bleeding or clotting, any changes in diet. Asked nurse to call back with any questions or concerns.      Amy Godinez, SHAWN     ADDENDUM:  Asha calls back to report pt is on antibiotic for a UTI.  Seamus RN         ADDENDUM from 1/10:  Pt had an INR today of 2.38 - this was during an MD visit.  No call is made re: this - will continue current dosing plan with home care doing next INR next week.  Seamus ESCOBAR

## 2018-01-08 NOTE — MR AVS SNAPSHOT
Sohan Jama   1/8/2018   Anticoagulation Therapy Visit    Description:  67 year old male   Provider:  Amy Godinez, RN   Department:  [facility]           INR as of 1/8/2018     Today's INR 2.6!      Anticoagulation Summary as of 1/8/2018     INR goal    Prior goal 1.8-2.5   Today's INR 2.6!   Full instructions 1/9: 3 mg; Otherwise 3 mg on Mon; 4.5 mg all other days   Next INR check 1/15/2018    Indications   LVAD (left ventricular assist device) present [Z95.811]  Long-term (current) use of anticoagulants [Z79.01] [Z79.01]         January 2018 Details    Sun Mon Tue Wed Thu Fri Sat      1               2               3               4               5               6                 7               8      3 mg   See details      9      3 mg         10      4.5 mg         11      4.5 mg         12      4.5 mg         13      4.5 mg           14      4.5 mg         15            16               17               18               19               20                 21               22               23               24               25               26               27                 28               29               30               31                   Date Details   01/08 This INR check       Date of next INR:  1/15/2018         How to take your warfarin dose     To take:  3 mg Take 3 of the 1 mg tablets.    To take:  3 mg Take 1 of the 3 mg tablets.    To take:  4.5 mg Take 1.5 of the 3 mg tablets.

## 2018-01-09 NOTE — PROGRESS NOTES
[facility] Home Visit Assessment     Home visit for Health Risk Assessment with Sohan Rendon completed on January 8, 2018  Member resides in [facility] and lives with adult daughter  Present at assessment: [facility] assessor Madie Beasley, client's daughter and RP Daisha Ruffin and [facility] CM.    Current Care Plan  Member currently receiving the following services: PCA, RN and extended PCA through the CADI waiver.       Medication Review  Medication reconciliation completed in Epic:Yes  Medication set-up & administration: Family/informal caregiver sets up weekly.  Family caregiver administers medications.  Medication understanding concerns (by member, family or CC): No  Medication adherence concerns (by member, family or CC): No    Mental/Behavioral Health   Depression Screening: See PHQ assessment flowsheet.   Mental Health Diagnosis: No    Falls in last 12 months: No    ADL/IADL Dependencies: Bathing, Dressing, Grooming, Eating, Positioning, Transfers, Toileting, Wheelchair, Cleaning, Cooking, Laundry, Shopping, Meal prep, Medication Management, Money Management and Transportation     [facility] Health Plan sponsored benefits: Shared information re: Silver Sneakers/gym memberships, ASA, Calcium +D.    PCA Assessment completed at visit: Yes     Elderly Waiver Eligibility: Client is on the CADI waiver.     Care Plan & Recommendations: Continue PCA services. CM to request incontinence supplies for client.     See [facility] for detailed assessment information.    Follow-Up Plan: Member informed of future contact, plan to f/u with member with a 6 month telephone assessment.  Contact information shared with member and family, encouraged member to call with any questions or concerns at any time.  [place] care continuum providers: Please refer to Health Care Home on the [facility] Problem List to view this patient's [facility] Care Plan Summary.  Nena Salazar RN  [facility] Case  Manager  [phone]

## 2018-01-10 NOTE — MR AVS SNAPSHOT
After Visit Summary   1/10/2018    Sohan Rendon    MRN: [id number]           Patient Information     Date Of Birth          1950        Visit Information        Provider Department      1/10/2018 9:45 AM Evon Lemons MD; [employer] [facility]        Today's Diagnoses     Chronic systolic congestive heart failure (H)          Care Instructions    Preventive Care:    Colorectal Cancer Screening: During our visit today, we discussed that it is recommended you receive colorectal cancer screening. Please call or make an appointment with your primary care provider to discuss this. You may also call the [facility] scheduling line ([phone]) to set up a colonoscopy appointment.    Medications:  1.  No medication changes.    Follow-up:  1.  Return to clinic on 3/9 to see Nurse Zuleika Practitioner.    Instructions:  1.   Your INR today was 2.38.    Page the VAD Coordinator on call if you gain more than 3 lb in a day or 5 in a week. Please also page if you feel unwell or have alarms.     Great to see you in clinic today. To Page the VAD Coordinator on call, dial [phone] option #4 and ask to speak to the VAD coordinator on call.                     Follow-ups after your visit        Your next 10 appointments already scheduled     Jan 29, 2018 12:00 PM CST   Home Initial Visit with Raghu Almodovar MD   [facility] ([facility])    [address] [place] [address] [address]  [facility] [id number] [phone]            Mar 09, 2018 10:30 AM CST   (Arrive by 10:15 AM)   Implanted Defibulator with Uc Cv Device 1   [facility])    [address] [place] [address]  [address] [place] [id number] [phone]            Mar 09, 2018 11:00 AM CST   (Arrive by 10:45 AM)   Ventricular Assist Device with CLAIRE Escobar [facility] ([facility])    [address] [place] [address]  [address]  [facility] [address]   [phone]              Future tests that were ordered for you today     Open Future Orders        Priority Expected Expires Ordered    Follow-Up with Device Clinic - 3 months Routine 4/10/2018 2018 1/10/2018            Who to contact     If you have questions or need follow up information about today's clinic visit or your schedule please contact [facility] directly at [phone].  Normal or non-critical lab and imaging results will be communicated to you by [employer], letter or phone within 4 business days after the clinic has received the results. If you do not hear from us within 7 days, please contact the clinic through [employer] or phone. If you have a critical or abnormal lab result, we will notify you by phone as soon as possible.  Submit refill requests through [employer] or call your pharmacy and they will forward the refill request to us. Please allow 3 business days for your refill to be completed.          Additional Information About Your Visit        [employer] Information     [employer] lets you send messages to your doctor, view your test results, renew your prescriptions, schedule appointments and more. To sign up, go to [email or web address] . Click on \"Log in\" on the left side of the screen, which will take you to the Welcome page. Then click on \"Sign up Now\" on the right side of the page.     You will be asked to enter the access code listed below, as well as some personal information. Please follow the directions to create your username and password.     Your access code is: [email or web address]  Expires: 3/12/2018  6:30 AM     Your access code will  in 90 days. If you need help or a new code, please call your [facility] clinic or [phone].        Care EveryWhere ID     This is your Care EveryWhere ID. This could be used by other organizations to access your [facility] medical records  [id number]        Your Vitals Were     Pulse Pulse Oximetry             "    59 99%           Blood Pressure from Last 3 Encounters:   01/10/18 (!) 96/0   11/26/17 98/85   11/17/17 90/65    Weight from Last 3 Encounters:   11/26/17 84.2 kg (185 lb 10 oz)   11/17/17 81.6 kg (180 lb)   09/06/17 77.1 kg (170 lb)              We Performed the Following     CBC with platelets     Comprehensive metabolic panel     INR          Today's Medication Changes          These changes are accurate as of: 1/10/18 10:19 AM.  If you have any questions, ask your nurse or doctor.               These medicines have changed or have updated prescriptions.        Dose/Directions    simethicone 80 MG chewable tablet   Commonly known as:  MYLICON   This may have changed:    - when to take this  - reasons to take this   Used for:  Slow transit constipation        Dose:  80 mg   Take 1 tablet (80 mg) by mouth 4 times daily   Quantity:  180 tablet   Refills:  5                Primary Care Provider Office Phone # Fax #    CLAIRE Rosas [phone]       [address]  [facility] [address]        Equal Access to Services     [facility]: Hadii aad ku hadasho Soedna, waaxda luqadaha, qaybta kaalmada rose, willie dorman . So [facility] [phone].    ATENCIÓN: Si habla español, tiene a smith disposición servicios gratuitos de asistencia lingüística. [facility] [phone].    We comply with applicable federal civil rights laws and [place] laws. We do not discriminate on the basis of race, color, national origin, age, disability, sex, sexual orientation, or gender identity.            Thank you!     Thank you for choosing [facility]  for your care. Our goal is always to provide you with excellent care. Hearing back from our patients is one way we can continue to improve our services. Please take a few minutes to complete the written survey that you may receive in the mail after your visit with us. Thank you!             Your Updated Medication List -  Protect others around you: Learn how to safely use, store and throw away your medicines at www.disposemymeds.org.          This list is accurate as of: 1/10/18 10:19 AM.  Always use your most recent med list.                   Brand Name Dispense Instructions for use Diagnosis    acetaminophen 325 MG tablet    TYLENOL    100 tablet    Take 2 tablets (650 mg) by mouth every 6 hours as needed for mild pain        ALEK CONTOUR test strip   Generic drug:  blood glucose monitoring     100 strip    USE TO TEST BLOOD SUGAR 2 TIMES DAILY OR AS DIRECTED.    Hypoglycemia       bisacodyl 10 MG Suppository    DULCOLAX    5 suppository    Place 1 suppository (10 mg) rectally daily as needed for constipation    Drug-induced constipation       * blood glucose monitoring lancets     1 Box    Use to test blood sugars 2 times daily or as directed.    Diabetes mellitus, type 2 (H)       * blood glucose monitoring lancets     100 each    USE TO TEST BLOOD SUGAR 2 TIMES DAILY OR AS DIRECTED    Diabetes mellitus, type 2 (H)       calcium carbonate-vitamin D 500-400 MG-UNIT Tabs per tablet     90 tablet    Take 1 tablet by mouth daily    Cardiac arrhythmia, unspecified cardiac arrhythmia type       cephALEXin 500 MG capsule    KEFLEX    20 capsule    Take 1 capsule (500 mg) by mouth 2 times daily    Urinary tract infection with hematuria, site unspecified       finasteride 5 MG tablet    PROSCAR    90 tablet    Take 1 tablet (5 mg) by mouth daily    Incomplete bladder emptying       furosemide 20 MG tablet    LASIX    20 tablet    Take 1 tablet (20 mg) by mouth as needed    Chronic systolic congestive heart failure (H)       gabapentin 100 MG capsule    NEURONTIN    60 capsule    Take 1 capsule (100 mg) by mouth 2 times daily as needed    Left foot pain       ketotifen 0.025 % Soln ophthalmic solution    ZADITOR/REFRESH ANTI-ITCH    1 Bottle    Place 1 drop into both eyes 2 times daily    Allergic conjunctivitis, bilateral        levETIRAcetam 750 MG tablet    KEPPRA    180 tablet    TAKE 1 TABLET (750 MG) BY MOUTH 2 TIMES DAILY    Convulsions, unspecified convulsion type (H)       loratadine 10 MG tablet    CLARITIN    90 tablet    TAKE 1 TABLET (10 MG) BY MOUTH DAILY    Allergic rhinitis       melatonin 3 MG tablet      Take 1 tablet (3 mg) by mouth At Bedtime    Insomnia, unspecified type       nitroGLYcerin 0.4 MG sublingual tablet    NITROSTAT    30 tablet    Place 1 tablet (0.4 mg) under the tongue every 5 minutes as needed for chest pain    Coronary artery disease involving native coronary artery with unstable angina pectoris (H)       nystatin 908661 UNIT/GM Powd    MYCOSTATIN    60 g    Apply to affected areas of skin in the groin and on the scrotum three times a day as needed.    Intertriginous dermatitis associated with moisture       * order for DME     1 Device    Equipment being ordered: Lift chair.  Please see indications and face-to-face encounter details in 2/3/15 Office Visit note.    Fracture of femoral neck, right, closed, initial encounter, Stroke (H), CHF (congestive heart failure), NYHA class IV (H)       * order for DME     2 each    Equipment being ordered: Bilateral leg supports for manual wheelchair.    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  Please fax to [facility]; I called them 11/26/16 and they requested the new order.  Face to face is in my 10/26/16 note.    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  Dx: previous stroke with left hemiplegia.  Duration of need: 99 months.    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, subsequent encounter       PANTOPRAZOLE SODIUM PO      Take 20 mg by mouth every morning (before breakfast)        ranitidine 75 MG tablet    ZANTAC    30 tablet    Take 1 tablet (75 mg) by mouth At Bedtime    Gastroesophageal reflux disease without esophagitis       SENEXON-S 8.6-50 MG per tablet   Generic drug:  senna-docusate     60 tablet    TAKE 1-2 TABLETS BY MOUTH 2 TIMES DAILY AS NEEDED FOR CONSTIPATION    Slow transit constipation       simethicone 80 MG chewable tablet    MYLICON    180 tablet    Take 1 tablet (80 mg) by mouth 4 times daily    Slow transit constipation       tamsulosin 0.4 MG capsule    FLOMAX    90 capsule    TAKE 1 CAPSULE EVERY DAY    Incomplete bladder emptying       thiamine 100 MG tablet     90  tablet    TAKE 1 TABLET (100 MG) BY MOUTH DAILY    Cerebrovascular accident (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 Take 25mg by mouth every evening.        warfarin 3 MG tablet    COUMADIN     Take 4.5 mg by mouth daily        * Notice:  This list has 10 medication(s) that are the same as other medications prescribed for you. Read the directions carefully, and ask your doctor or other care provider to review them with you.

## 2018-01-10 NOTE — PATIENT INSTRUCTIONS
Preventive Care:    Colorectal Cancer Screening: During our visit today, we discussed that it is recommended you receive colorectal cancer screening. Please call or make an appointment with your primary care provider to discuss this. You may also call the [facility] scheduling line ([phone]) to set up a colonoscopy appointment.    Medications:  1.  No medication changes.    Follow-up:  1.  Return to clinic on 3/9 to see Zuleika Nurse Practitioner.    Instructions:  1.   Your INR today was 2.38.    Page the VAD Coordinator on call if you gain more than 3 lb in a day or 5 in a week. Please also page if you feel unwell or have alarms.     Great to see you in clinic today. To Page the VAD Coordinator on call, dial [phone] option #[phone] and ask to speak to the VAD coordinator on call.

## 2018-01-10 NOTE — NURSING NOTE
Chief Complaint   Patient presents with     Follow Up For     VAD 3 month Follow up      Vitals were taken and medications were reconciled. Doppler was performed.    YESENIA Fontana  9:18 AM

## 2018-01-10 NOTE — MR AVS SNAPSHOT
After Visit Summary   1/10/2018    Sohan Rendon    MRN: [id number]           Patient Information     Date Of Birth          1950        Visit Information        Provider Department      1/10/2018 8:45 AM 1, Cara Cv Device; [employer] [facility]        Today's Diagnoses     Ischemic cardiomyopathy    -  1      Care Instructions    It was a pleasure to see you in clinic today.  Please do not hesitate to call us if you have any questions or concerns.  Please return to clinic in 3 mos for a ICD check.        Daysi Morales R.N.  Electrophysiology nurse specialist  [facility]  [address] [place] [address]     Radhasal Wagner  [phone] business hours    After business hours please call [phone], option #4, and ask for Job code [id number].                  Follow-ups after your visit        Additional Services     Follow-Up with Device Clinic - 3 months                 Follow-up notes from your care team     Discussed this visit Return in about 3 months (around 4/10/2018).      Your next 10 appointments already scheduled     Bin 10, 2018  9:45 AM CST   Ventricular Assist Device with Evon Lemons MD   [facility] [place])    [address] [place] [address]  [address] [place] [id number]   [phone]            Jan 29, 2018 12:00 PM CST   Home Initial Visit with Raghu Almodovar MD   [facility] ([facility])    [address] [place] [id number]   [phone]            Mar 09, 2018 10:30 AM CST   (Arrive by 10:15 AM)   Implanted Defibulator with Uc Cv Device 1   [facility] ([facility] [place])    [address] [place] [address]  [address] [place] [id number]   [phone]            Mar 09, 2018 11:00 AM CST   (Arrive by 10:45 AM)   Ventricular Assist Device with CLAIRE Escobar [facility]  Refill request received from pharmacy via Interface.   [facility]    [address] [facility]  [address] [place] [id number] [phone]              Future tests that were ordered for you today     Open Future Orders        Priority Expected Expires Ordered    Follow-Up with Device Clinic - 3 months Routine 4/10/2018 2018 1/10/2018            Who to contact     If you have questions or need follow up information about today's clinic visit or your schedule please contact [facility] directly at [phone].  Normal or non-critical lab and imaging results will be communicated to you by [employer], letter or phone within 4 business days after the clinic has received the results. If you do not hear from us within 7 days, please contact the clinic through [employer] or phone. If you have a critical or abnormal lab result, we will notify you by phone as soon as possible.  Submit refill requests through [employer] or call your pharmacy and they will forward the refill request to us. Please allow 3 business days for your refill to be completed.          Additional Information About Your Visit        [employer] Information     [employer] lets you send messages to your doctor, view your test results, renew your prescriptions, schedule appointments and more. To sign up, go to [email or web address] . Click on \"Log in\" on the left side of the screen, which will take you to the Welcome page. Then click on \"Sign up Now\" on the right side of the page.     You will be asked to enter the access code listed below, as well as some personal information. Please follow the directions to create your username and password.     Your access code is: [email or web address]  Expires: 3/12/2018  6:30 AM     Your access code will  in 90 days. If you need help or a new code, please call your [facility] clinic or [phone].        Care EveryWhere ID     This is your Care EveryWhere ID. This could be used by other organizations to access your [facility] medical " records  [id number]         Blood Pressure from Last 3 Encounters:   11/26/17 98/85   11/17/17 90/65   11/14/17 96/66    Weight from Last 3 Encounters:   11/26/17 84.2 kg (185 lb 10 oz)   11/17/17 81.6 kg (180 lb)   09/06/17 77.1 kg (170 lb)              We Performed the Following     (48776) ICD DEVICE PROGRAMMING EVAL, SINGLE LEAD ICD     ICD DEVICE PROGRAMMING EVAL, SINGLE LEAD ICD          Today's Medication Changes          These changes are accurate as of: 1/10/18  9:37 AM.  If you have any questions, ask your nurse or doctor.               These medicines have changed or have updated prescriptions.        Dose/Directions    simethicone 80 MG chewable tablet   Commonly known as:  MYLICON   This may have changed:    - when to take this  - reasons to take this   Used for:  Slow transit constipation        Dose:  80 mg   Take 1 tablet (80 mg) by mouth 4 times daily   Quantity:  180 tablet   Refills:  5                Primary Care Provider Office Phone # Fax #    Shilpi Agosto APRN [phone]       [address]  [facility] [place] [address]        Equal Access to Services     [facility] HospitalBRENDA AH: Hadii aad ku hadasho Soomaali, waaxda luqadaha, qaybta kaalmada adeegyada, willie magdaleno haymarcon neelima quiñonez. So [facility] [phone].    ATENCIÓN: Si habla español, tiene a smith disposición servicios gratuitos de asistencia lingüística. Llame al [phone].    We comply with applicable federal civil rights laws and [place] laws. We do not discriminate on the basis of race, color, national origin, age, disability, sex, sexual orientation, or gender identity.            Thank you!     Thank you for choosing [facility]  for your care. Our goal is always to provide you with excellent care. Hearing back from our patients is one way we can continue to improve our services. Please take a few minutes to complete the written survey that you may receive in the mail after your visit with us. Thank  you!             Your Updated Medication List - Protect others around you: Learn how to safely use, store and throw away your medicines at www.disposemymeds.org.          This list is accurate as of: 1/10/18  9:37 AM.  Always use your most recent med list.                   Brand Name Dispense Instructions for use Diagnosis    acetaminophen 325 MG tablet    TYLENOL    100 tablet    Take 2 tablets (650 mg) by mouth every 6 hours as needed for mild pain        ALEK CONTOUR test strip   Generic drug:  blood glucose monitoring     100 strip    USE TO TEST BLOOD SUGAR 2 TIMES DAILY OR AS DIRECTED.    Hypoglycemia       bisacodyl 10 MG Suppository    DULCOLAX    5 suppository    Place 1 suppository (10 mg) rectally daily as needed for constipation    Drug-induced constipation       * blood glucose monitoring lancets     1 Box    Use to test blood sugars 2 times daily or as directed.    Diabetes mellitus, type 2 (H)       * blood glucose monitoring lancets     100 each    USE TO TEST BLOOD SUGAR 2 TIMES DAILY OR AS DIRECTED    Diabetes mellitus, type 2 (H)       calcium carbonate-vitamin D 500-400 MG-UNIT Tabs per tablet     90 tablet    Take 1 tablet by mouth daily    Cardiac arrhythmia, unspecified cardiac arrhythmia type       cephALEXin 500 MG capsule    KEFLEX    20 capsule    Take 1 capsule (500 mg) by mouth 2 times daily    Urinary tract infection with hematuria, site unspecified       finasteride 5 MG tablet    PROSCAR    90 tablet    Take 1 tablet (5 mg) by mouth daily    Incomplete bladder emptying       furosemide 20 MG tablet    LASIX    20 tablet    Take 1 tablet (20 mg) by mouth as needed    Chronic systolic congestive heart failure (H)       gabapentin 100 MG capsule    NEURONTIN    60 capsule    Take 1 capsule (100 mg) by mouth 2 times daily as needed    Left foot pain       ketotifen 0.025 % Soln ophthalmic solution    ZADITOR/REFRESH ANTI-ITCH    1 Bottle    Place 1 drop into both eyes 2 times daily     Allergic conjunctivitis, bilateral       levETIRAcetam 750 MG tablet    KEPPRA    180 tablet    TAKE 1 TABLET (750 MG) BY MOUTH 2 TIMES DAILY    Convulsions, unspecified convulsion type (H)       loratadine 10 MG tablet    CLARITIN    90 tablet    TAKE 1 TABLET (10 MG) BY MOUTH DAILY    Allergic rhinitis       melatonin 3 MG tablet      Take 1 tablet (3 mg) by mouth At Bedtime    Insomnia, unspecified type       nitroGLYcerin 0.4 MG sublingual tablet    NITROSTAT    30 tablet    Place 1 tablet (0.4 mg) under the tongue every 5 minutes as needed for chest pain    Coronary artery disease involving native coronary artery with unstable angina pectoris (H)       nystatin 061042 UNIT/GM Powd    MYCOSTATIN    60 g    Apply to affected areas of skin in the groin and on the scrotum three times a day as needed.    Intertriginous dermatitis associated with moisture       * order for DME     1 Device    Equipment being ordered: Lift chair.  Please see indications and face-to-face encounter details in 2/3/15 Office Visit note.    Fracture of femoral neck, right, closed, initial encounter, Stroke (H), CHF (congestive heart failure), NYHA class IV (H)       * order for DME     2 each    Equipment being ordered: Bilateral leg supports for manual wheelchair.    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  Please fax to [facility]; I called them 11/26/16 and they requested the new order.  Face to face is in my 10/26/16 note.    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  Dx: previous stroke with left hemiplegia.  Duration of need: 99 months.    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, subsequent encounter       PANTOPRAZOLE SODIUM PO      Take 20 mg by mouth every morning (before breakfast)        ranitidine 75 MG tablet    ZANTAC    30 tablet    Take 1 tablet (75 mg) by mouth At Bedtime    Gastroesophageal reflux disease without esophagitis       SENEXON-S 8.6-50 MG per tablet   Generic drug:  senna-docusate     60 tablet    TAKE 1-2 TABLETS BY MOUTH 2 TIMES DAILY AS NEEDED FOR CONSTIPATION    Slow transit constipation       simethicone 80 MG chewable tablet    MYLICON    180 tablet    Take 1 tablet (80 mg) by mouth 4 times daily    Slow transit constipation       tamsulosin 0.4 MG capsule    FLOMAX    90 capsule    TAKE 1 CAPSULE EVERY DAY    Incomplete bladder  emptying       thiamine 100 MG tablet     90 tablet    TAKE 1 TABLET (100 MG) BY MOUTH DAILY    Cerebrovascular accident (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 Take 25mg by mouth every evening.        warfarin 3 MG tablet    COUMADIN     Take 4.5 mg by mouth daily        * Notice:  This list has 10 medication(s) that are the same as other medications prescribed for you. Read the directions carefully, and ask your doctor or other care provider to review them with you.

## 2018-01-10 NOTE — PROGRESS NOTES
HPI:   Mr. Rendon is a 68 yo male with an ischemic cardiomyopathy s/p HM II LVAD as DT complicated by pump thrombosis, recurrent hemolysis, and multiple CVA's who returns to the Heart Failure clinic for ongoing management of his cardiomyopathy.  His medical history is notable for CKD, C difficile colitis, hypertension, hyperlipidemia, urinary retention previously requiring an indwelling urinary catheter, dysphagia and aspiration, and latent TB.  He was previously followed in the complex care clinic.    Since we last saw him in 9/2017, he was hospitalized from 11/22 - 11/26/2017 for suspected worsening of his pump thrombosis manifesting as increased hemolysis and abdominal discomfort.  Atorvastatin and allopurinol were discontinued.  Since discharge he has been feeling fatigued, but not overall particularly different from his baseline.  He denies any chest pain, increase in dyspnea, and lightheadedness or dizziness.  His daughter gave him a prn lasix 2 days ago for the perception of increased fluid in his abdomen and now he feels back to his baseline.  She gives him a prn lasix perhaps once monthly she says. He was recently started on an antibiotic for a urinary tract infection. He has some discomfort in his right ear without the sensation of hearing loss or severe ear pain.      Past Medical History:   Diagnosis Date     Acute right MCA stroke (H) 6/2013    With L sided hemiparesis      Anemia of chronic disease     Baseline Hb 8-9     BPH (benign prostatic hyperplasia)      CHF (congestive heart failure), NYHA class IV (H) 10/9/2012    s/p HeartMate II.  Was on milrinone and dobutamine prior to LVAD      CKD (chronic kidney disease)     Baseline Cr=     Clostridium difficile colitis 12/2012      Dysphagia     PEG tube placed in 2012.  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  Normal carotid duplex 3/2014.        Esophagitis 12/2012    EGD with esophagitis and multiple douenal ulcers     Fracture of femoral neck, right, closed (H) 2/3/2015    Presumed pathologic as patient is non-weight-bearing and suffered no trauma.  Family declined operative repair during hospital stay 1/23 - 1/30/15.     Gastric and duodenal angiodysplasia with hemorrhage 6/18/2015     GERD (gastroesophageal reflux disease)      Gout      Hematuria      HTN (hypertension)     LDL=59 (3/29/2014)     Hyperlipaemia      Ischemic cardiomyopathy      Mitral regurgitation     s/p MVR with Carbomedics ring      Myocardial infarction 1998    In [facility] without intervention      Nonsenile cataract     BE     PFO (patent foramen ovale)     s/p closure (10/9/2012)     TB lung, latent     s/p 9 months INH in 2012      Past Surgical History:   Procedure Laterality Date     C CABG, VEIN, FIVE  2001     CATARACT IOL, RT/LT Right 11/19/2015     ENDOSCOPIC RETROGRADE CHOLANGIOPANCREATOGRAM N/A 5/9/2017    Procedure: COMBINED ENDOSCOPIC RETROGRADE CHOLANGIOPANCREATOGRAPHY, PLACE TUBE/STENT;  Endoscopic Retrograde Cholangiopancreatogram, Stent (Zimmon Biliary 7fr 12cm) Placement;  Surgeon: Cristo Grove MD;  Location: [facility] OR     ESOPHAGOSCOPY, GASTROSCOPY, DUODENOSCOPY (EGD), COMBINED N/A 6/10/2015    Procedure: COMBINED ESOPHAGOSCOPY, GASTROSCOPY, DUODENOSCOPY (EGD);  Surgeon: Edu Jenkins MD;  Location: [facility] GI     ESOPHAGOSCOPY, GASTROSCOPY, DUODENOSCOPY (EGD), COMBINED N/A 6/15/2015    Procedure: COMBINED ESOPHAGOSCOPY, GASTROSCOPY, DUODENOSCOPY (EGD);  Surgeon: Yury Bonner MD;  Location: [facility] OR     H INSERT ICD  2/10/2011    And pacemaker.  Not BiV     INSERT VENTRICULAR ASSIST DEVICE LEFT (HEARTMATE II)  10/9/2012     IR GASTRO JEJUNOSTOMY TUBE PLACEMENT       PHACOEMULSIFICATION CLEAR CORNEA WITH STANDARD INTRAOCULAR LENS IMPLANT Right 11/19/2015    Procedure: PHACOEMULSIFICATION CLEAR CORNEA WITH STANDARD INTRAOCULAR LENS IMPLANT;  Surgeon: Ba  Violeta PASTRANA MD;  Location: [facility] OR     REPAIR PATENT FORAMEN OVALE  10/9/2012     REPAIR VALVE MITRAL  2/9/2012    28 mm Carbomedics 2/3 ring      Family History   Problem Relation Age of Onset     Family History Negative No family hx of      Glaucoma No family hx of      Macular Degeneration No family hx of      CANCER No family hx of      no skin cancer     Social History     Social History     Marital status:      Spouse name: N/A     Number of children: N/A     Years of education: N/A     Occupational History     Not on file.     Social History Main Topics     Smoking status: Former Smoker     Smokeless tobacco: Former User     Alcohol use No     Drug use: No     Sexual activity: Not on file     Other Topics Concern     Not on file     Social History Narrative       Current Outpatient Prescriptions on File Prior to Visit:  PANTOPRAZOLE SODIUM PO Take 20 mg by mouth every morning (before breakfast)   cephALEXin (KEFLEX) 500 MG capsule Take 1 capsule (500 mg) by mouth 2 times daily   blood glucose monitoring (ALEK MICROLET) lancets USE TO TEST BLOOD SUGAR 2 TIMES DAILY OR AS DIRECTED   acetaminophen (TYLENOL) 325 MG tablet Take 2 tablets (650 mg) by mouth every 6 hours as needed for mild pain   gabapentin (NEURONTIN) 100 MG capsule Take 1 capsule (100 mg) by mouth 2 times daily as needed   warfarin (COUMADIN) 3 MG tablet Take 4.5 mg by mouth daily   ALEK CONTOUR test strip USE TO TEST BLOOD SUGAR 2 TIMES DAILY OR AS DIRECTED.   nystatin (MYCOSTATIN) 519670 UNIT/GM POWD Apply to affected areas of skin in the groin and on the scrotum three times a day as needed.   loratadine (CLARITIN) 10 MG tablet TAKE 1 TABLET (10 MG) BY MOUTH DAILY   order for DME Bilateral heel protective cushioning boots.  Dx: previous stroke with left hemiplegia.  Duration of need: 99 months.   Metoprolol Succinate (TOPROL XL PO) Take 50mg by mouth every morning. Take 25mg by mouth every evening.   levETIRAcetam (KEPPRA) 750 MG tablet  TAKE 1 TABLET (750 MG) BY MOUTH 2 TIMES DAILY   tamsulosin (FLOMAX) 0.4 MG capsule TAKE 1 CAPSULE EVERY DAY   furosemide (LASIX) 20 MG tablet Take 1 tablet (20 mg) by mouth as needed   ketotifen (ZADITOR/REFRESH ANTI-ITCH) 0.025 % SOLN ophthalmic solution Place 1 drop into both eyes 2 times daily   SENEXON-S 8.6-50 MG per tablet TAKE 1-2 TABLETS BY MOUTH 2 TIMES DAILY AS NEEDED FOR CONSTIPATION   bisacodyl (DULCOLAX) 10 MG Suppository Place 1 suppository (10 mg) rectally daily as needed for constipation   finasteride (PROSCAR) 5 MG tablet Take 1 tablet (5 mg) by mouth daily   calcium carbonate-vitamin D 500-400 MG-UNIT TABS per tablet Take 1 tablet by mouth daily   simethicone (MYLICON) 80 MG chewable tablet Take 1 tablet (80 mg) by mouth 4 times daily (Patient taking differently: Take 80 mg by mouth every 6 hours as needed )   oxyCODONE (ROXICODONE) 5 MG IR tablet Take 1 tablet (5 mg) by mouth every 4 hours as needed for moderate to severe pain   melatonin 3 MG tablet Take 1 tablet (3 mg) by mouth At Bedtime   Thiamine HCl (VITAMIN B-1) 100 MG TABS TAKE 1 TABLET (100 MG) BY MOUTH DAILY   order for DME Equipment being ordered: Cushioned heel boots.   order for DME Equipment being ordered: Wheelchair, manual, with elevated leg rests and tilt in space back.  Please fax to [facility]; I called them 11/26/16 and they requested the new order.  Face to face is in my 10/26/16 note.   order for DME Equipment being ordered: Wheelchair, manual.   order for DME Equipment being ordered: Hospital Bed, fully electric.   order for DME Equipment being ordered: Reclining manual w/c with bilateral elevating leg rests.   order for DME Equipment being ordered: Bilateral leg supports for manual wheelchair.   nitroglycerin (NITROSTAT) 0.4 MG SL tablet Place 1 tablet (0.4 mg) under the tongue every 5 minutes as needed for chest pain   blood glucose monitoring (NO BRAND SPECIFIED) lancets Use to test blood sugars 2 times daily or as directed.    ORDER FOR DME Equipment being ordered: Lift chair.Please see indications and face-to-face encounter details in 2/3/15 Office Visit note.   ranitidine (ZANTAC) 75 MG tablet Take 1 tablet (75 mg) by mouth At Bedtime (Patient not taking: Reported on 1/9/2018)     No current facility-administered medications on file prior to visit.      Allergies   Allergen Reactions     Seasonal Allergies          ROS:   CONSTITUTIONAL: Denies fever, chills, fatigue, or weight fluctuations.   HEENT: Denies headache, vision changes, and changes in speech.   CV: Refer to HPI.   PULMONARY:Denies shortness of breath, cough.  GI: Refer to HPI.   :Refer to HPI.  EXT:Denies lower extremity edema.   SKIN:Denies abnormal rashes or lesions.   MUSCULOSKELETAL:Denies upper or lower extremity weakness and pain.   NEUROLOGIC:Denies lightheadedness, dizziness, seizures, or upper or lower extremity paresthesia.     EXAM:  BP (!) 96/0 (BP Location: Right arm, Patient Position: Chair, Cuff Size: Adult Regular)  Pulse 59  SpO2 99%   GENERAL: Frail, chronically ill-appearing male with unlabored breathing, lying in bed.  Weak - he required assistance even to bend forwardt.  HEENT:  Anicteric sclerae.  Right auditory canal has a significant amount of ear wax but no erythema or exudate in its visible portion.  The tympanic membrane was not visible.  Pinna was not tender to manipulation.  NECK: Supple, JVD was at the neck base when lying nearly flat.  CV: normal LVAD hum; inaudible heart sounds otherwise.  Warm extremities with no peripheral edema, cyanosis, or mottling.  RESPIRATORY: Lungs were clear on ascultation bilaterally.  GI: Slightly distended but not tender.  The LVAD driveline site was not tender.  Active bowel sounds.  NEUROLOGIC: Alert and communicates appropriately per .  MUSCULOSKELETAL: No joint swelling.  SKIN: Not jaundiced.  LVAD driveline site is dressed.    Labs:   Recent Labs   Lab Test  01/10/18   0939  12/12/17   1500   11/26/17   0955  11/25/17   0844  11/24/17   0757  11/23/17   1552   CR  1.96*  1.93*  2.55*  2.65*  2.56*  2.43*   BUN  23  19  28  31*  31*  29   POTASSIUM  4.0  3.6  4.3  4.2  4.3  4.0   MAG   --    --   2.1  2.1  2.2  2.2     Recent Labs   Lab Test  01/10/18   0939  12/12/17   1500  11/26/17   0955  11/25/17   0844  11/24/17   0757  11/23/17   1552   HGB  11.0*  10.4*  9.3*  9.1*  10.1*  10.0*   PLT  228  265  173  160  169  175     Recent Labs   Lab Test  01/10/18   0939 01/08/18 01/02/18   1446  12/26/17   1250 12/19/17 12/12/17   1500   INR  2.38*  2.6*  2.3  2.6  2.6  2.14*     Echocardiogram 11/22/2017:  HM II LVAD Study at 8800 RPM. Technically difficult due to extremely poor  acoustic windows.     Left ventricular wall thickness and size is normal (LVEDD = 5.6 cm; LVESD =  4.9 cm).Visual estimate of LVEF is <30% which is severely reduced.  Inflow cannula visualized in LV with non-pulsatile flow by PW spectral Doppler at velocities < 20 cm/s and no color flow demonstrated at a Nyquist limit of 60 cm/s. In one view there is a small echodensity which is probable artifact. Clinical correlation required. Outflow cannula visualized in the aorta with low pulsatile flow (PSV 74 cm/s, EDV 30 cm/s) on Spectral Doppler and color flow demonstrated. Septum midline with slight bowing towards LV in diastole. Moderately reduced RV systolic function. Unable to assess RV size but on limited views appears enlarged.  Aortic valve poorly visualized but appears closed with no Doppler color flow across valve. Mitral annular calcification and mitral valve thickening with no significant dysfunction. TV not visualized. PA systolic pressure 24 mmHg plus RA pressure. IVC not visualized. No obvious pericardial effusion.     Compared to prior study (8/2017), biventricular function appears worsened on some views and septum bows slightly to the left. Inflow velocities are less pulsatile with slightly lower  velocites.      Interrogation of his single chamber ICD today showed 30% RV pacing and minimal HR variation.    VAD Interrogation today: VAD interrogation reviewed with VAD coordinator. Agree with findings. No power spikes, speed drops, PI events or other findings suspicious of pump malfunction noted.          Assessment and Plan:   Mr. Rendon is a pleasant 67 year old male with chronic systolic heart failure s/p HM II DT LVAD and multiple CVAs felt to be embolic from LVAD. He is stable from a cardiac standpoint apart from his known LVAD thrombus and elevated LDH despite attempted treatment with intensified anticoagulation.    1. Chronic systolic heart failure secondary to ICM s/p HM II LVAD as DT  Stage D. NYHA Class unable to assess due to functional limitations from CVA.  Fluid status: euvolemic; took a prn lasix 2 days ago; infrequently needed per daughter.  ACEi/ARB: discontinued because of worsened renal function which has since improved.  May try adding back at a future visit if needed for BP control.  BB:  Toprol XL 50 mg qAM and 25 mg aPM  Aldosterone antagonist: no evidence for use in setting of LVAD  SCD prophylaxis: ICD      2.  S/p LVAD HM II as DT   MAP 82  No long checking LDH given inability to successfully intervene/improve.  Anticoagulation: INR 2.38; goal 1.8-2.3, managed by our anticoagulation clinic  Antiplatelet: held for hematuria    3.  CAD:  Continue beta blocker.  Atorvastatin recently d/c'd in effort to reduce medications per family's request.  ASA deferred secondary to past history of gross hematuria.    4.  CKD - worsened during recent hospitalization and now improved.  Continuing to hold ACE/ARB.     5.  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  No on antiplatelet as mentioned above.     6.  Chronic UTI with recent urosepsis, again on treatment.        Follow up:  With [facility], on 3/9/17.  He is in the process of transferring from the complex care clinic to the geriatric  clinic.    I discussed the patient with Dr. Evon Lemons.    Jaxon Sosa MD  Cardiovascular disease fellow      I have reviewed today's vital signs, notes, medications, labs and imaging. I have also seen and examined the patient and agree with the findings and plan as outlined above.  Focusing on symptomatic therapies.  Has h/o episodically elevated LDH which is not responsive to IV anticoagulation.  Has also had issues with hematuria thus will only treat symptomatically if develops concerns for repeat LVAD thrombosis.  Pt not a candidate for LVAD replacement.  Pt and family aware.      Evon Lemons MD  Section Head - Advanced Heart Failure, Transplantation and Mechanical Circulatory Support  Co-Director - [facility]  Associate Professor of Medicine, [facility]

## 2018-01-10 NOTE — PROGRESS NOTES
Pt seen in clinic for evaluation and iterative programming of his Medtronic single chamber ICD per MD order.  He looks well and he states that he is feeling fine.  He is here today to see Dr Lemons for a routine follow up.  His ICD check does not show any episodes of ventricular arrhythmias, he is RV pacing 70.9% of the time, his optivol is at baseline and his heart rate histograms show minimal heart rate variation.  His ICD battery is good and his ICD is functioning normally.  Pt states that he has been coming to clinic every 3 mos so we will plan to see him back in clinic in 3 mos, if for some reason he does not need to come back in 3 mos we can change the appt to a remote ICD check.    Single ICD

## 2018-01-10 NOTE — NURSING NOTE
1). PUMP DATA  Primary controller serial number: [id number]     II:   Flow: --- L/min,    Speed: 8800 RPMs,     PI: 4.8 ,  Power: 5 Slaughter,      Primary controller     Data downloaded: No   Equipment and driveline assessed for damage: Yes   Small tear in driveline.  Repaired w/ rescue tape.    Back up : Serial number: [id number]        Education complete: Yes   Charge the BACKUP controller s backup battery every 6 months  Perform a self test on BACKUP every 6 months          2). ALARMS  Alarms reported by patient since last pump evaluation: No  Alarms or other finding noted during pump data history and alarm download: No    Action Taken:  Reviewed data with patient: Yes      3). DRESSING CHANGE / DRIVELINE ASSESSMENT  Dressing change completed today: No  Appearance of Driveline site: Per pt - C, D, I    Driveline stabilization: Method: [employer]  [ Teaching reinforced on need for stabilization of Driveline. ]

## 2018-01-10 NOTE — PATIENT INSTRUCTIONS
It was a pleasure to see you in clinic today.  Please do not hesitate to call us if you have any questions or concerns.  Please return to clinic in 3 mos for a ICD check.        Daysi Morales R.N.  Electrophysiology nurse specialist  [facility]  [address] [address]     Radha Wagner  [phone] business hours    After business hours please call [phone], option #4, and ask for Job code [id number].

## 2018-01-10 NOTE — LETTER
1/10/2018      RE: Sohan Rendon  [address]   [facility] [address]       Dear Colleague,    Thank you for the opportunity to participate in the care of your patient, Sohan Rendon, at the [facility]. Please see a copy of my visit note below.    HPI:   Mr. Rendon is a 66 yo male with an ischemic cardiomyopathy s/p HM II LVAD as DT complicated by pump thrombosis, recurrent hemolysis, and multiple CVA's who returns to the Heart Failure clinic for ongoing management of his cardiomyopathy.  His medical history is notable for CKD, C difficile colitis, hypertension, hyperlipidemia, urinary retention previously requiring an indwelling urinary catheter, dysphagia and aspiration, and latent TB.  He was previously followed in the complex care clinic.    Since we last saw him in 9/2017, he was hospitalized from 11/22 - 11/26/2017 for suspected worsening of his pump thrombosis manifesting as increased hemolysis and abdominal discomfort.  Atorvastatin and allopurinol were discontinued.  Since discharge he has been feeling fatigued, but not overall particularly different from his baseline.  He denies any chest pain, increase in dyspnea, and lightheadedness or dizziness.  His daughter gave him a prn lasix 2 days ago for the perception of increased fluid in his abdomen and now he feels back to his baseline.  She gives him a prn lasix perhaps once monthly she says. He was recently started on an antibiotic for a urinary tract infection. He has some discomfort in his right ear without the sensation of hearing loss or severe ear pain.      Past Medical History:   Diagnosis Date     Acute right MCA stroke (H) 6/2013    With L sided hemiparesis      Anemia of chronic disease     Baseline Hb 8-9     BPH (benign prostatic hyperplasia)      CHF (congestive heart failure), NYHA class IV (H) 10/9/2012    s/p HeartMate II.  Was on milrinone and dobutamine prior to LVAD       CKD (chronic kidney disease)     Baseline Cr=     Clostridium difficile colitis 12/2012      Dysphagia     PEG tube placed in 2012.  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  Normal carotid duplex 3/2014.       Esophagitis 12/2012    EGD with esophagitis and multiple douenal ulcers     Fracture of femoral neck, right, closed (H) 2/3/2015    Presumed pathologic as patient is non-weight-bearing and suffered no trauma.  Family declined operative repair during hospital stay 1/23 - 1/30/15.     Gastric and duodenal angiodysplasia with hemorrhage 6/18/2015     GERD (gastroesophageal reflux disease)      Gout      Hematuria      HTN (hypertension)     LDL=59 (3/29/2014)     Hyperlipaemia      Ischemic cardiomyopathy      Mitral regurgitation     s/p MVR with Carbomedics ring      Myocardial infarction 1998    In [facility] without intervention      Nonsenile cataract     BE     PFO (patent foramen ovale)     s/p closure (10/9/2012)     TB lung, latent     s/p 9 months INH in 2012      Past Surgical History:   Procedure Laterality Date     C CABG, VEIN, FIVE  2001     CATARACT IOL, RT/LT Right 11/19/2015     ENDOSCOPIC RETROGRADE CHOLANGIOPANCREATOGRAM N/A 5/9/2017    Procedure: COMBINED ENDOSCOPIC RETROGRADE CHOLANGIOPANCREATOGRAPHY, PLACE TUBE/STENT;  Endoscopic Retrograde Cholangiopancreatogram, Stent (Zimmon Biliary 7fr 12cm) Placement;  Surgeon: Cristo Grove MD;  Location: [facility] OR     ESOPHAGOSCOPY, GASTROSCOPY, DUODENOSCOPY (EGD), COMBINED N/A 6/10/2015    Procedure: COMBINED ESOPHAGOSCOPY, GASTROSCOPY, DUODENOSCOPY (EGD);  Surgeon: Edu Jenkins MD;  Location: [facility] GI     ESOPHAGOSCOPY, GASTROSCOPY, DUODENOSCOPY (EGD), COMBINED N/A 6/15/2015    Procedure: COMBINED ESOPHAGOSCOPY, GASTROSCOPY, DUODENOSCOPY (EGD);  Surgeon: Yury Bonner MD;  Location: [facility] OR     H INSERT ICD  2/10/2011    And pacemaker.  Not  BiV     INSERT VENTRICULAR ASSIST DEVICE LEFT (HEARTMATE II)  10/9/2012     IR GASTRO JEJUNOSTOMY TUBE PLACEMENT       PHACOEMULSIFICATION CLEAR CORNEA WITH STANDARD INTRAOCULAR LENS IMPLANT Right 11/19/2015    Procedure: PHACOEMULSIFICATION CLEAR CORNEA WITH STANDARD INTRAOCULAR LENS IMPLANT;  Surgeon: Violeta Cosme MD;  Location: [facility] OR     REPAIR PATENT FORAMEN OVALE  10/9/2012     REPAIR VALVE MITRAL  2/9/2012    28 mm Carbomedics 2/3 ring      Family History   Problem Relation Age of Onset     Family History Negative No family hx of      Glaucoma No family hx of      Macular Degeneration No family hx of      CANCER No family hx of      no skin cancer     Social History     Social History     Marital status:      Spouse name: N/A     Number of children: N/A     Years of education: N/A     Occupational History     Not on file.     Social History Main Topics     Smoking status: Former Smoker     Smokeless tobacco: Former User     Alcohol use No     Drug use: No     Sexual activity: Not on file     Other Topics Concern     Not on file     Social History Narrative       Current Outpatient Prescriptions on File Prior to Visit:  PANTOPRAZOLE SODIUM PO Take 20 mg by mouth every morning (before breakfast)   cephALEXin (KEFLEX) 500 MG capsule Take 1 capsule (500 mg) by mouth 2 times daily   blood glucose monitoring (ALEK MICROLET) lancets USE TO TEST BLOOD SUGAR 2 TIMES DAILY OR AS DIRECTED   acetaminophen (TYLENOL) 325 MG tablet Take 2 tablets (650 mg) by mouth every 6 hours as needed for mild pain   gabapentin (NEURONTIN) 100 MG capsule Take 1 capsule (100 mg) by mouth 2 times daily as needed   warfarin (COUMADIN) 3 MG tablet Take 4.5 mg by mouth daily   ALEK CONTOUR test strip USE TO TEST BLOOD SUGAR 2 TIMES DAILY OR AS DIRECTED.   nystatin (MYCOSTATIN) 941433 UNIT/GM POWD Apply to affected areas of skin in the groin and on the scrotum three times a day as needed.   loratadine (CLARITIN) 10 MG tablet TAKE  1 TABLET (10 MG) BY MOUTH DAILY   order for DME Bilateral heel protective cushioning boots.  Dx: previous stroke with left hemiplegia.  Duration of need: 99 months.   Metoprolol Succinate (TOPROL XL PO) Take 50mg by mouth every morning. Take 25mg by mouth every evening.   levETIRAcetam (KEPPRA) 750 MG tablet TAKE 1 TABLET (750 MG) BY MOUTH 2 TIMES DAILY   tamsulosin (FLOMAX) 0.4 MG capsule TAKE 1 CAPSULE EVERY DAY   furosemide (LASIX) 20 MG tablet Take 1 tablet (20 mg) by mouth as needed   ketotifen (ZADITOR/REFRESH ANTI-ITCH) 0.025 % SOLN ophthalmic solution Place 1 drop into both eyes 2 times daily   SENEXON-S 8.6-50 MG per tablet TAKE 1-2 TABLETS BY MOUTH 2 TIMES DAILY AS NEEDED FOR CONSTIPATION   bisacodyl (DULCOLAX) 10 MG Suppository Place 1 suppository (10 mg) rectally daily as needed for constipation   finasteride (PROSCAR) 5 MG tablet Take 1 tablet (5 mg) by mouth daily   calcium carbonate-vitamin D 500-400 MG-UNIT TABS per tablet Take 1 tablet by mouth daily   simethicone (MYLICON) 80 MG chewable tablet Take 1 tablet (80 mg) by mouth 4 times daily (Patient taking differently: Take 80 mg by mouth every 6 hours as needed )   oxyCODONE (ROXICODONE) 5 MG IR tablet Take 1 tablet (5 mg) by mouth every 4 hours as needed for moderate to severe pain   melatonin 3 MG tablet Take 1 tablet (3 mg) by mouth At Bedtime   Thiamine HCl (VITAMIN B-1) 100 MG TABS TAKE 1 TABLET (100 MG) BY MOUTH DAILY   order for DME Equipment being ordered: Cushioned heel boots.   order for DME Equipment being ordered: Wheelchair, manual, with elevated leg rests and tilt in space back.  Please fax to [employer]; I called them 11/26/16 and they requested the new order.  Face to face is in my 10/26/16 note.   order for DME Equipment being ordered: Wheelchair, manual.   order for DME Equipment being ordered: Hospital Bed, fully electric.   order for DME Equipment being ordered: Reclining manual w/c with bilateral elevating leg rests.   order for  DME Equipment being ordered: Bilateral leg supports for manual wheelchair.   nitroglycerin (NITROSTAT) 0.4 MG SL tablet Place 1 tablet (0.4 mg) under the tongue every 5 minutes as needed for chest pain   blood glucose monitoring (NO BRAND SPECIFIED) lancets Use to test blood sugars 2 times daily or as directed.   ORDER FOR DME Equipment being ordered: Lift chair.Please see indications and face-to-face encounter details in 2/3/15 Office Visit note.   ranitidine (ZANTAC) 75 MG tablet Take 1 tablet (75 mg) by mouth At Bedtime (Patient not taking: Reported on 1/9/2018)     No current facility-administered medications on file prior to visit.      Allergies   Allergen Reactions     Seasonal Allergies          ROS:   CONSTITUTIONAL: Denies fever, chills, fatigue, or weight fluctuations.   HEENT: Denies headache, vision changes, and changes in speech.   CV: Refer to HPI.   PULMONARY:Denies shortness of breath, cough.  GI: Refer to HPI.   :Refer to HPI.  EXT:Denies lower extremity edema.   SKIN:Denies abnormal rashes or lesions.   MUSCULOSKELETAL:Denies upper or lower extremity weakness and pain.   NEUROLOGIC:Denies lightheadedness, dizziness, seizures, or upper or lower extremity paresthesia.     EXAM:  BP (!) 96/0 (BP Location: Right arm, Patient Position: Chair, Cuff Size: Adult Regular)  Pulse 59  SpO2 99%   GENERAL: Frail, chronically ill-appearing male with unlabored breathing, lying in bed.  Weak - he required assistance even to bend forwardt.  HEENT:  Anicteric sclerae.  Right auditory canal has a significant amount of ear wax but no erythema or exudate in its visible portion.  The tympanic membrane was not visible.  Pinna was not tender to manipulation.  NECK: Supple, JVD was at the neck base when lying nearly flat.  CV: normal LVAD hum; inaudible heart sounds otherwise.  Warm extremities with no peripheral edema, cyanosis, or mottling.  RESPIRATORY: Lungs were clear on ascultation bilaterally.  GI: Slightly  distended but not tender.  The LVAD driveline site was not tender.  Active bowel sounds.  NEUROLOGIC: Alert and communicates appropriately per .  MUSCULOSKELETAL: No joint swelling.  SKIN: Not jaundiced.  LVAD driveline site is dressed.    Labs:   Recent Labs   Lab Test  01/10/18   0939  12/12/17   1500  11/26/17   0955  11/25/17   0844  11/24/17   0757  11/23/17   1552   CR  1.96*  1.93*  2.55*  2.65*  2.56*  2.43*   BUN  23  19  28  31*  31*  29   POTASSIUM  4.0  3.6  4.3  4.2  4.3  4.0   MAG   --    --   2.1  2.1  2.2  2.2     Recent Labs   Lab Test  01/10/18   0939  12/12/17   1500  11/26/17   0955  11/25/17   0844  11/24/17   0757  11/23/17   1552   HGB  11.0*  10.4*  9.3*  9.1*  10.1*  10.0*   PLT  228  265  173  160  169  175     Recent Labs   Lab Test  01/10/18   0939 01/08/18 01/02/18   1446  12/26/17   1250 12/19/17 12/12/17   1500   INR  2.38*  2.6*  2.3  2.6  2.6  2.14*     Echocardiogram 11/22/2017:  HM II LVAD Study at 8800 RPM. Technically difficult due to extremely poor  acoustic windows.     Left ventricular wall thickness and size is normal (LVEDD = 5.6 cm; LVESD =  4.9 cm).Visual estimate of LVEF is <30% which is severely reduced.  Inflow cannula visualized in LV with non-pulsatile flow by PW spectral Doppler at velocities < 20 cm/s and no color flow demonstrated at a Nyquist limit of 60 cm/s. In one view there is a small echodensity which is probable artifact. Clinical correlation required. Outflow cannula visualized in the aorta with low pulsatile flow (PSV 74 cm/s, EDV 30 cm/s) on Spectral Doppler and color flow demonstrated. Septum midline with slight bowing towards LV in diastole. Moderately reduced RV systolic function. Unable to assess RV size but on limited views appears enlarged.  Aortic valve poorly visualized but appears closed with no Doppler color flow across valve. Mitral annular calcification and mitral valve thickening with no significant dysfunction. TV not visualized.  PA systolic pressure 24 mmHg plus RA pressure. IVC not visualized. No obvious pericardial effusion.     Compared to prior study (8/2017), biventricular function appears worsened on some views and septum bows slightly to the left. Inflow velocities are less pulsatile with slightly lower velocites.      Interrogation of his single chamber ICD today showed 30% RV pacing and minimal HR variation.    VAD Interrogation today: VAD interrogation reviewed with VAD coordinator. Agree with findings. No power spikes, speed drops, PI events or other findings suspicious of pump malfunction noted.          Assessment and Plan:   Mr. Rendon is a pleasant 67 year old male with chronic systolic heart failure s/p HM II DT LVAD and multiple CVAs felt to be embolic from LVAD. He is stable from a cardiac standpoint apart from his known LVAD thrombus and elevated LDH despite attempted treatment with intensified anticoagulation.    1. Chronic systolic heart failure secondary to ICM s/p HM II LVAD as DT  Stage D. NYHA Class unable to assess due to functional limitations from CVA.  Fluid status: euvolemic; took a prn lasix 2 days ago; infrequently needed per daughter.  ACEi/ARB: discontinued because of worsened renal function which has since improved.  May try adding back at a future visit if needed for BP control.  BB:  Toprol XL 50 mg qAM and 25 mg aPM  Aldosterone antagonist: no evidence for use in setting of LVAD  SCD prophylaxis: ICD      2.  S/p LVAD HM II as DT   MAP 82  No long checking LDH given inability to successfully intervene/improve.  Anticoagulation: INR 2.38; goal 1.8-2.3, managed by our anticoagulation clinic  Antiplatelet: held for hematuria    3.  CAD:  Continue beta blocker.  Atorvastatin recently d/c'd in effort to reduce medications per family's request.  ASA deferred secondary to past history of gross hematuria.    4.  CKD - worsened during recent hospitalization and now improved.  Continuing to hold ACE/ARB.     5.  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  No on antiplatelet as mentioned above.     6.  Chronic UTI with recent urosepsis, again on treatment.        Follow up:  With [facility], on 3/9/17.  He is in the process of transferring from the complex care clinic to the geriatric clinic.    I discussed the patient with Dr. Evon Lemons.    Jaxon Sosa MD  Cardiovascular disease fellow      I have reviewed today's vital signs, notes, medications, labs and imaging. I have also seen and examined the patient and agree with the findings and plan as outlined above.  Focusing on symptomatic therapies.  Has h/o episodically elevated LDH which is not responsive to IV anticoagulation.  Has also had issues with hematuria thus will only treat symptomatically if develops concerns for repeat LVAD thrombosis.  Pt not a candidate for LVAD replacement.  Pt and family aware.      Evon Lemons MD  Section Head - Advanced Heart Failure, Transplantation and Mechanical Circulatory Support  Co-Director - Adult [facility]ital [facility]ovascular [facility]  Associate Professor of Medicine, [facility]

## 2018-01-11 NOTE — TELEPHONE ENCOUNTER
D:  Pt's daughter, Almaz, called to ask about how to manage the patient's coumadin since his INR was slightly increased at his provider's appointment today (2.38).  I:  I reviewed the record, lab results, and notes.  I communicated to Almaz that the Coumadin Clinic staff (Amy) had seen the change and had documented that they would recommend no changes from the plan provided on Monday.   She documented that they will wait for his INR test at home next week before making another change.  Almaz stated that she understood and that she would follow the plan they are currently using until next week when they get another check.  A:  Stable.  P:  Monitor INR per plan.

## 2018-01-11 NOTE — MR AVS SNAPSHOT
Sohan Rendon   1/11/2018   Anticoagulation Therapy Visit    Description:  67 year old male   Provider:  Blanca Bah, RN   Department:  [facility]           INR as of 1/11/2018     Today's INR       Anticoagulation Summary as of 1/11/2018     INR goal    Prior goal 1.8-2.5   Today's INR    Next INR check     Indications   LVAD (left ventricular assist device) present [Z95.811]  Long-term (current) use of anticoagulants [Z79.01] [Z79.01]         January 2018 Details    Sun Mon Tue Wed Thu Fri Sat      1               2               3               4               5               6                 7               8      3 mg   See details      9      3 mg         10      4.5 mg         11      4.5 mg         12      4.5 mg         13      4.5 mg           14      4.5 mg         15            16               17               18               19               20                 21               22               23               24               25               26               27                 28               29               30               31                   Date Details   01/08 This INR check       Date of next INR:  1/15/2018         How to take your warfarin dose     To take:  3 mg Take 3 of the 1 mg tablets.    To take:  3 mg Take 1 of the 3 mg tablets.    To take:  4.5 mg Take 1.5 of the 3 mg tablets.

## 2018-01-15 NOTE — PROGRESS NOTES
ANTICOAGULATION FOLLOW-UP CLINIC VISIT    Patient Name:  Sohan Rendon  Date:  1/15/2018  Contact Type:  Telephone    SUBJECTIVE:     Patient Findings     Positives Change in medications (finished keflex today)           OBJECTIVE    INR   Date Value Ref Range Status   01/15/2018 2.1  Final     Chromogenic Factor 10   Date Value Ref Range Status   08/10/2014 24 (L) 70 - 130 % Final     Comment:     Therapeutic Range:  A Chromogenic Factor 10 level of approximately 20-40%   inversely correlates with an INR of 2-3 for patients receiving Warfarin.   Chromogenic Factor 10 levels below 20% indicate an INR greater than 3 and   levels above 40% indicate an INR less than 2.       Factor 2 Assay   Date Value Ref Range Status   07/21/2014 25 (L) 60 - 140 % Final     Comment:     Analyte Specific Reagents (ASRs) are used in many laboratory tests necessary   for   standard medical care and generally do not require FDA approval.  This test   was   developed and its performance characteristics determined by [facility].  It has not been cleared or approved by   the [place] Food and Drug Administration.         ASSESSMENT / PLAN  INR assessment THER    Recheck INR In: 1 WEEK    INR Location [facility]      Anticoagulation Summary as of 1/15/2018     INR goal    Prior goal 1.8-2.5   Today's INR 2.1   Maintenance plan 3 mg (3 mg x 1) on Mon; 4.5 mg (3 mg x 1.5) all other days   Full instructions 3 mg on Mon; 4.5 mg all other days   Weekly total 30 mg   No change documented Sarah Osborne RN   Plan last modified Blanca Bah RN (12/19/2017)   Next INR check 1/22/2018   Priority INR   Target end date Indefinite    Indications   LVAD (left ventricular assist device) present [Z95.811]  Long-term (current) use of anticoagulants [Z79.01] [Z79.01]         Anticoagulation Episode Summary     INR check location     Preferred lab     Send INR reminders to [facility]    Comments  Goal Range is 1.8-2.3  [facility] comes out to draw INR  Sofya Ph [phone]  Daughter Almaz Ph [phone]      Anticoagulation Care Providers     Provider Role Specialty Phone number    Evon Lemons MD Responsible Cardiology [phone]            See the Encounter Report to view Anticoagulation Flowsheet and Dosing Calendar (Go to Encounters tab in chart review, and find the Anticoagulation Therapy Visit)    Spoke with Sofya MCKINNON.    Sarah Osborne RN

## 2018-01-15 NOTE — MR AVS SNAPSHOT
Sohan Rendon   1/15/2018   Anticoagulation Therapy Visit    Description:  68 year old male   Provider:  Sarah Osborne, RN   Department:  [facility]           INR as of 1/15/2018     Today's INR 2.1      Anticoagulation Summary as of 1/15/2018     INR goal    Prior goal 1.8-2.5   Today's INR 2.1   Full instructions 3 mg on Mon; 4.5 mg all other days   Next INR check 1/22/2018    Indications   LVAD (left ventricular assist device) present [Z95.811]  Long-term (current) use of anticoagulants [Z79.01] [Z79.01]         January 2018 Details    Sun Mon Tue Wed Thu Fri Sat      1               2               3               4               5               6                 7               8               9               10               11               12               13                 14               15      3 mg   See details      16      4.5 mg         17      4.5 mg         18      4.5 mg         19      4.5 mg         20      4.5 mg           21      4.5 mg         22            23               24               25               26               27                 28               29               30               31                   Date Details   01/15 This INR check       Date of next INR:  1/22/2018         How to take your warfarin dose     To take:  3 mg Take 1 of the 3 mg tablets.    To take:  4.5 mg Take 1.5 of the 3 mg tablets.

## 2018-01-19 NOTE — TELEPHONE ENCOUNTER
Mr Rendon is a complex care patient Family state due to see Dr Almodovar on 1/29 -  He has concerns over ear pain, they request a visit sooner than 1/29 if possible. Please let daughter know if/when patient may be seen at [phone]    Also having ear pain, recently seen by cardiology who request ENT consult. Placed ENT consult today and gave options of [facility], [facility] or [facility] clinics - provided contact numbers to family to arrange soonest possible visit.     Electronically signed by CLAIRE Burr, GNP

## 2018-01-22 NOTE — PROGRESS NOTES
ANTICOAGULATION FOLLOW-UP CLINIC VISIT    Patient Name:  Sohan Rendon  Date:  1/22/2018  Contact Type:  Telephone    SUBJECTIVE:     Patient Findings     Positives Unexplained INR or factor level change    Comments Will keep dose the same & check again in 2 days           OBJECTIVE    INR Protime   Date Value Ref Range Status   01/22/2018 2.7 (A) 0.86 - 1.14 Final     Chromogenic Factor 10   Date Value Ref Range Status   08/10/2014 24 (L) 70 - 130 % Final     Comment:     Therapeutic Range:  A Chromogenic Factor 10 level of approximately 20-40%   inversely correlates with an INR of 2-3 for patients receiving Warfarin.   Chromogenic Factor 10 levels below 20% indicate an INR greater than 3 and   levels above 40% indicate an INR less than 2.       Factor 2 Assay   Date Value Ref Range Status   07/21/2014 25 (L) 60 - 140 % Final     Comment:     Analyte Specific Reagents (ASRs) are used in many laboratory tests necessary   for   standard medical care and generally do not require FDA approval.  This test   was   developed and its performance characteristics determined by [facility].  It has not been cleared or approved by   the [place] Food and Drug Administration.         ASSESSMENT / PLAN  INR assessment SUPRA    Recheck INR In: 2 DAYS    INR Location [facility]      Anticoagulation Summary as of 1/22/2018     INR goal    Prior goal 1.8-2.5   Today's INR 2.7!   Maintenance plan 3 mg (3 mg x 1) on Mon; 4.5 mg (3 mg x 1.5) all other days   Full instructions 3 mg on Mon; 4.5 mg all other days   Weekly total 30 mg   No change documented Amy Godinez, SHAWN   Plan last modified Blanca Bah RN (12/19/2017)   Next INR check 1/25/2018   Priority INR   Target end date Indefinite    Indications   LVAD (left ventricular assist device) present [Z95.811]  Long-term (current) use of anticoagulants [Z79.01] [Z79.01]         Anticoagulation Episode Summary     INR check location      Preferred lab     Send INR reminders to [facility]    Comments Goal Range is 1.8-2.3  [facility] comes out to draw INR  Sofya Ph [phone]  Daughter Almaz Ph [phone]      Anticoagulation Care Providers     Provider Role Specialty Phone number    Evon Lemons MD Responsible Cardiology [phone]            See the Encounter Report to view Anticoagulation Flowsheet and Dosing Calendar (Go to Encounters tab in chart review, and find the Anticoagulation Therapy Visit)    Spoke with home care nurse Renetta.    Amy Godinez RN

## 2018-01-22 NOTE — MR AVS SNAPSHOT
Sohan [facility]   1/22/2018   Anticoagulation Therapy Visit    Description:  68 year old male   Provider:  Amy Godinez, RN   Department:  [facility]           INR as of 1/22/2018     Today's INR 2.7!      Anticoagulation Summary as of 1/22/2018     INR goal    Prior goal 1.8-2.5   Today's INR 2.7!   Full instructions 3 mg on Mon; 4.5 mg all other days   Next INR check 1/25/2018    Indications   LVAD (left ventricular assist device) present [Z95.811]  Long-term (current) use of anticoagulants [Z79.01] [Z79.01]         January 2018 Details    Sun Mon Tue Wed Thu Fri Sat      1               2               3               4               5               6                 7               8               9               10               11               12               13                 14               15               16               17               18               19               20                 21               22      3 mg   See details      23      4.5 mg         24      4.5 mg         25            26               27                 28               29               30               31                   Date Details   01/22 This INR check       Date of next INR:  1/25/2018         How to take your warfarin dose     To take:  3 mg Take 1 of the 3 mg tablets.    To take:  4.5 mg Take 1.5 of the 3 mg tablets.

## 2018-01-24 NOTE — TELEPHONE ENCOUNTER
Daughter called and reports that her father gout is flaring up now, c/o pain in both big toe, painful, warm this am,  Has similar episode in the past.  Allopurinol was stopped during last hospitalization in November 2017 due to worsening renal function- most likely. Has ischemic cardiomyopathy.     Daughter claims that Dr. Almodovar is the new PCP, and schedule for home visit at the end of this month. Used to see Dr. Dill before.       I called [facility] pharmacy on [address] in [facility], for colchicine 1.2 mg po x1 then 0.6 mg po x1 after one hour. Advised daughter to call in am in no improvement.

## 2018-01-24 NOTE — PROGRESS NOTES
[place] [facility]    Chief Complaint   Patient presents with     [facility]       HPI:    Sohan Rendon is a 68 year old  (1950), who is being seen today for an episodic care visit at home.  HPI information obtained from: patient report, [facility] chart review, family/first contact daughter report and  care coordinator.  present for patient.Today's concern is:  1. Chronic systolic congestive heart failure, NYHA class 4 (H) - has destination LVAD, very limited activity. Had Cardiology check 2 weeks ago, ok, sees q 3 months. Denies CP or SOB at rest   2. LVAD (left ventricular assist device) present - working ok, family, PCA able to do daily care   3. CKD (chronic kidney disease) stage 4, GFR 15-29 ml/min (H) - improving, continue to monitor   4. Complication involving left ventricular assist device (LVAD), sequela[ Has repeated thrombus from LVAD, caused multiple strokes, Thrombus still present    5. Seizure (H) - none recently, controlled on Keppra   6. History of ischemic cerebrovascular accident (CVA) with residual deficit - sig debility, non ambulatory, needs total care   7. Urinary retention - recurrent, recent UTI, suspect incomplete emptying- .   8. 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 Long-term (current) use of anticoagulants [Z79.01] - stable, monitored by [facility] cardiology/coumadin clinic   10. Bilateral impacted cerumen - complaining of ear pain, bilat, cerumen impaction   11. Slow transit constipation - stable,BM q 2 days       Based on JNC-8 goals,  patients age of 68 year old, presence of diabetes or CKD, and goals of care goal BP is  <140/90 mm Hg. patient is stable with current plan of care and routine assessment..    ALLERGIES: Seasonal allergies  Past Medical, Surgical, Family and Social History reviewed and updated in [facility].    Current Outpatient Prescriptions   Medication Sig  Dispense Refill     carbamide peroxide (DEBROX) 6.5 % otic solution Place 5-10 drops into both ears 2 times daily for 9 days 14.8 mL 1     tamsulosin (FLOMAX) 0.4 MG capsule Take 2 capsules (0.8 mg) by mouth daily 60 capsule 11     PANTOPRAZOLE SODIUM PO Take 20 mg by mouth every morning (before breakfast)       blood glucose monitoring (ALEK MICROLET) lancets USE TO TEST BLOOD SUGAR 2 TIMES DAILY OR AS DIRECTED 100 each 2     acetaminophen (TYLENOL) 325 MG tablet Take 2 tablets (650 mg) by mouth every 6 hours as needed for mild pain 100 tablet 0     gabapentin (NEURONTIN) 100 MG capsule Take 1 capsule (100 mg) by mouth 2 times daily as needed 60 capsule 3     ranitidine (ZANTAC) 75 MG tablet Take 1 tablet (75 mg) by mouth At Bedtime (Patient not taking: Reported on 1/9/2018) 30 tablet 3     warfarin (COUMADIN) 3 MG tablet Take 4.5 mg by mouth daily       ALEK CONTOUR test strip USE TO TEST BLOOD SUGAR 2 TIMES DAILY OR AS DIRECTED. 100 strip 2     nystatin (MYCOSTATIN) 211063 UNIT/GM POWD Apply to affected areas of skin in the groin and on the scrotum three times a day as needed. 60 g 3     loratadine (CLARITIN) 10 MG tablet TAKE 1 TABLET (10 MG) BY MOUTH DAILY 90 tablet 2     order for DME Bilateral heel protective cushioning boots.  Dx: previous stroke with left hemiplegia.  Duration of need: 99 months. 2 each 0     Metoprolol Succinate (TOPROL XL PO) Take 50mg by mouth every morning. Take 25mg by mouth every evening.       levETIRAcetam (KEPPRA) 750 MG tablet TAKE 1 TABLET (750 MG) BY MOUTH 2 TIMES DAILY 180 tablet 3     furosemide (LASIX) 20 MG tablet Take 1 tablet (20 mg) by mouth as needed 20 tablet 11     ketotifen (ZADITOR/REFRESH ANTI-ITCH) 0.025 % SOLN ophthalmic solution Place 1 drop into both eyes 2 times daily 1 Bottle 11     SENEXON-S 8.6-50 MG per tablet TAKE 1-2 TABLETS BY MOUTH 2 TIMES DAILY AS NEEDED FOR CONSTIPATION 60 tablet 3     bisacodyl (DULCOLAX) 10 MG Suppository Place 1 suppository (10  mg) rectally daily as needed for constipation 5 suppository 1     finasteride (PROSCAR) 5 MG tablet Take 1 tablet (5 mg) by mouth daily 90 tablet 3     calcium carbonate-vitamin D 500-400 MG-UNIT TABS per tablet Take 1 tablet by mouth daily 90 tablet 3     simethicone (MYLICON) 80 MG chewable tablet Take 1 tablet (80 mg) by mouth 4 times daily (Patient taking differently: Take 80 mg by mouth every 6 hours as needed ) 180 tablet 5     oxyCODONE (ROXICODONE) 5 MG IR tablet Take 1 tablet (5 mg) by mouth every 4 hours as needed for moderate to severe pain 30 tablet 0     melatonin 3 MG tablet Take 1 tablet (3 mg) by mouth At Bedtime       Thiamine HCl (VITAMIN B-1) 100 MG TABS TAKE 1 TABLET (100 MG) BY MOUTH DAILY 90 tablet 3     order for DME Equipment being ordered: Cushioned heel boots. 2 each 0     order for DME Equipment being ordered: Wheelchair, manual, with elevated leg rests and tilt in space back.  Please fax to [facility] [place]; I called them 11/26/16 and they requested the new order.  Face to face is in my 10/26/16 note. 1 each 0     order for DME Equipment being ordered: Wheelchair, manual. 1 each 0     order for DME Equipment being ordered: Hospital Bed, fully electric. 1 each 0     order for DME Equipment being ordered: Reclining manual w/c with bilateral elevating leg rests. 1 each 0     order for DME Equipment being ordered: Bilateral leg supports for manual wheelchair. 2 each 0     nitroglycerin (NITROSTAT) 0.4 MG SL tablet Place 1 tablet (0.4 mg) under the tongue every 5 minutes as needed for chest pain 30 tablet 1     blood glucose monitoring (NO BRAND SPECIFIED) lancets Use to test blood sugars 2 times daily or as directed. 1 Box 3     ORDER FOR DME Equipment being ordered: Lift chair.    Please see indications and face-to-face encounter details in 2/3/15 Office Visit note. 1 Device 0     Medications reviewed:  Medications reconciled to facility chart and changes were made to reflect current medications as  identified as above med list. Below are the changes that were made:   Medications stopped since last EPIC medication reconciliation:   There are no discontinued medications.    Medications started since last [facility] medication reconciliation:  No orders of the defined types were placed in this encounter.        REVIEW OF SYSTEMS:  10 point ROS of systems including Constitutional, Eyes, Respiratory, Cardiovascular, Gastroenterology, Genitourinary, Integumentary, Muscularskeletal, Psychiatric were all negative except for pertinent positives noted in my HPI.    Physical Exam:  There were no vitals taken for this visit.  GENERAL APPEARANCE:  Alert, anxious, cooperative  ENT:  Mouth and posterior oropharynx normal, moist mucous membranes, [place]  RESP:  respiratory effort and palpation of chest normal, lungs clear to auscultation , no respiratory distress  CV:  Palpation and auscultation of heart done , no edema, LVAD mechanical sound present throughout chest. No other heart sounds heard  ABDOMEN:  normal bowel sounds, soft, nontender, no hepatosplenomegaly or other masses  M/S:   Gait and station abnormal non ambulatory 2nd to multiple CVAs  SKIN:  Inspection of skin and subcutaneous tissue baseline  NEURO:   Residual  Lt hemiparesis and weakness  PSYCH:  oriented to person, place, affect and mood normal    Recent Labs:   CBC RESULTS:   Recent Labs   Lab Test  01/10/18   0939  12/12/17   1500   WBC  5.8  8.1   RBC  4.00*  3.59*   HGB  11.0*  10.4*   HCT  36.6*  34.0*   MCV  92  95   MCH  27.5  29.0   MCHC  30.1*  30.6*   RDW  18.5*  19.7*   PLT  228  265       Last Basic Metabolic Panel:  Recent Labs   Lab Test  01/10/18   0939  12/12/17   1500   NA  139  140   POTASSIUM  4.0  3.6   CHLORIDE  108  110*   JOSÉ  8.3*  8.4*   CO2  23  23   BUN  23  19   CR  1.96*  1.93*   GLC  109*  141*       Liver Function Studies -   Recent Labs   Lab Test  01/10/18   0939  11/26/17   0955   PROTTOTAL  7.8  7.6   ALBUMIN  3.4  3.3*   BILITOTAL   0.6  1.5*   ALKPHOS  122  130   AST  42  Unsatisfactory specimen - hemolyzed   ALT  14  38       TSH   Date Value Ref Range Status   08/10/2015 1.32 0.40 - 4.00 mU/L Final   01/28/2015 0.89 0.40 - 4.00 mU/L Final     Comment:     Effective 7/30/2014, the reference range for this assay has changed to reflect   new instrumentation/methodology.       Lab Results   Component Value Date    A1C 4.8 08/04/2017    A1C 5.2 07/21/2016         Assessment/Plan:  1. Chronic systolic congestive heart failure, NYHA class 4 (H) - has destination LVAD, very limited activity. Had Cardiology check 2 weeks ago, ok, sees q 3 months. Denies CP or SOB at rest. Cont weekly nurse visits, monthly visits by us and q 3 month cardiology visits   2. LVAD (left ventricular assist device) present - working ok, family, PCA able to do daily care   3. CKD (chronic kidney disease) stage 4, GFR 15-29 ml/min (H) - improving, continue to monitor   4. Complication involving left ventricular assist device (LVAD), sequela[ Has repeated thrombus from LVAD, caused multiple strokes, Thrombus still present - cont coumadin   5. Seizure (H) - none recently, controlled on Keppra- continue   6. History of ischemic cerebrovascular accident (CVA) with residual deficit - sig debility, non ambulatory, needs total care   7. Urinary retention - recurrent, recent UTI, symptoms resolved, suspect incomplete emptying- .will increase Flomax to 0.8 mg/day   8. 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 Long-term (current) use of anticoagulants [Z79.01] - stable, monitored by [facility] cardiology/coumadin clinic   10. Bilateral impacted cerumen - complaining of ear pain, bilat, cerumen impaction- ordered Debrox drops, will recheck 1 week, Next Wed at 1:00 pm   11. Slow transit constipation - stable,BM q 2 days, cont tx     Will reserve ACP discussion until I have a chance to discuss with  preferred proxy, Almaz johnson. - [phone]  [facility] Nurse- Renetta- [phone]      Total time with an established patient visit in the assisted livin minutes including discussions about the POC and care coordination with the patient, family, daughter, caregiver and FP care coordinator and home care nurse. Greater than 50% of total time spent with counseling and coordinating care due to multiple new issues and care paln confirmation    Electronically signed by  Raghu Almodovar MD    Medically necessity for home visit: Multiple CVAs, bedbound. Unable to ambulate or travel independently w/o attendant and medical transport

## 2018-01-25 NOTE — MR AVS SNAPSHOT
Sohan Rendon   1/25/2018   Anticoagulation Therapy Visit    Description:  68 year old male   Provider:  Blanca Bah, RN   Department:  [facility]           INR as of 1/25/2018     Today's INR 2.6!      Anticoagulation Summary as of 1/25/2018     INR goal    Prior goal 1.8-2.5   Today's INR 2.6!   Full instructions 1/25: 3 mg; Otherwise 3 mg on Mon; 4.5 mg all other days   Next INR check 1/29/2018    Indications   LVAD (left ventricular assist device) present [Z95.811]  Long-term (current) use of anticoagulants [Z79.01] [Z79.01]         January 2018 Details    Sun Mon Tue Wed Thu Fri Sat      1               2               3               4               5               6                 7               8               9               10               11               12               13                 14               15               16               17               18               19               20                 21               22               23               24               25      3 mg   See details      26      4.5 mg         27      4.5 mg           28      4.5 mg         29            30               31                   Date Details   01/25 This INR check       Date of next INR:  1/29/2018         How to take your warfarin dose     To take:  3 mg Take 1 of the 3 mg tablets.    To take:  4.5 mg Take 1.5 of the 3 mg tablets.

## 2018-01-25 NOTE — PROGRESS NOTES
ANTICOAGULATION FOLLOW-UP CLINIC VISIT    Patient Name:  Sohan Rendon  Date:  1/25/2018  Contact Type:  Telephone    SUBJECTIVE:     Patient Findings     Positives Unexplained INR or factor level change    Comments Home care usually sees patient on Mondays.           OBJECTIVE    INR   Date Value Ref Range Status   01/25/2018 2.6  Final     Chromogenic Factor 10   Date Value Ref Range Status   08/10/2014 24 (L) 70 - 130 % Final     Comment:     Therapeutic Range:  A Chromogenic Factor 10 level of approximately 20-40%   inversely correlates with an INR of 2-3 for patients receiving Warfarin.   Chromogenic Factor 10 levels below 20% indicate an INR greater than 3 and   levels above 40% indicate an INR less than 2.       Factor 2 Assay   Date Value Ref Range Status   07/21/2014 25 (L) 60 - 140 % Final     Comment:     Analyte Specific Reagents (ASRs) are used in many laboratory tests necessary   for   standard medical care and generally do not require FDA approval.  This test   was   developed and its performance characteristics determined by [facility].  It has not been cleared or approved by   the [place] Food and Drug Administration.         ASSESSMENT / PLAN  INR assessment SUPRA    Recheck INR In: 4 DAYS    INR Location Homecare INR      Anticoagulation Summary as of 1/25/2018     INR goal    Prior goal 1.8-2.5   Today's INR 2.6!   Maintenance plan 3 mg (3 mg x 1) on Mon; 4.5 mg (3 mg x 1.5) all other days   Full instructions 1/25: 3 mg; Otherwise 3 mg on Mon; 4.5 mg all other days   Weekly total 30 mg   Plan last modified Blanca Bah RN (12/19/2017)   Next INR check 1/29/2018   Priority INR   Target end date Indefinite    Indications   LVAD (left ventricular assist device) present [Z95.811]  Long-term (current) use of anticoagulants [Z79.01] [Z79.01]         Anticoagulation Episode Summary     INR check location     Preferred lab     Send INR reminders to [facility]    Comments Goal Range is 1.8-2.3  [facility] comes out to draw INR  Sofya Ph [phone]  Daughter Amlaz Ph [phone]      Anticoagulation Care Providers     Provider Role Specialty Phone number    Evon Lemons MD Responsible Cardiology [phone]            See the Encounter Report to view Anticoagulation Flowsheet and Dosing Calendar (Go to Encounters tab in chart review, and find the Anticoagulation Therapy Visit)    Spoke with Renetta MCKINNON.    Blanca Bah RN

## 2018-01-29 PROBLEM — N18.4 CKD (CHRONIC KIDNEY DISEASE) STAGE 4, GFR 15-29 ML/MIN (H): Status: ACTIVE | Noted: 2018-01-01

## 2018-01-29 NOTE — PROGRESS NOTES
ANTICOAGULATION FOLLOW-UP CLINIC VISIT    Patient Name:  Sohan Rendon  Date:  1/29/2018  Contact Type:  Telephone    SUBJECTIVE:        OBJECTIVE    INR   Date Value Ref Range Status   01/29/2018 2.5  Final     Chromogenic Factor 10   Date Value Ref Range Status   08/10/2014 24 (L) 70 - 130 % Final     Comment:     Therapeutic Range:  A Chromogenic Factor 10 level of approximately 20-40%   inversely correlates with an INR of 2-3 for patients receiving Warfarin.   Chromogenic Factor 10 levels below 20% indicate an INR greater than 3 and   levels above 40% indicate an INR less than 2.       Factor 2 Assay   Date Value Ref Range Status   07/21/2014 25 (L) 60 - 140 % Final     Comment:     Analyte Specific Reagents (ASRs) are used in many laboratory tests necessary   for   standard medical care and generally do not require FDA approval.  This test   was   developed and its performance characteristics determined by [facility].  It has not been cleared or approved by   the [place] Food and Drug Administration.         ASSESSMENT / PLAN  INR assessment SUPRA    Recheck INR In: 1 WEEK    INR Location [facility] INR      Anticoagulation Summary as of 1/29/2018     INR goal    Prior goal 1.8-2.5   Today's INR 2.5   Maintenance plan 3 mg (3 mg x 1) on Mon; 4.5 mg (3 mg x 1.5) all other days   Full instructions 1/31: 3 mg; 2/2: 3 mg; Otherwise 3 mg on Mon; 4.5 mg all other days   Weekly total 30 mg   Plan last modified Blanca Bah RN (12/19/2017)   Next INR check 2/5/2018   Priority INR   Target end date Indefinite    Indications   LVAD (left ventricular assist device) present [Z95.811]  Long-term (current) use of anticoagulants [Z79.01] [Z79.01]         Anticoagulation Episode Summary     INR check location     Preferred lab     [facility]    Comments Goal Range is 1.8-2.3  [facility] comes out to draw INR  Sofya Santiago  [phone]  Daughter Almaz  [phone]      Anticoagulation Care Providers     Provider Role Specialty Phone number    Evon Lemons MD Responsible Cardiology [phone]            See the Encounter Report to view Anticoagulation Flowsheet and Dosing Calendar (Go to Encounters tab in chart review, and find the Anticoagulation Therapy Visit)    Spoke with Sofya MCKINNON.  She reports no changes in health, diet, medications.  Will try warfarin 3 mg MWF and 4.5 mg all other days of the week.    Sarah Osborne RN

## 2018-01-29 NOTE — MR AVS SNAPSHOT
Sohan [facility]   1/29/2018   Anticoagulation Therapy Visit    Description:  68 year old male   Provider:  Sarah Osborne, RN   Department:  [facility]           INR as of 1/29/2018     Today's INR 2.5      Anticoagulation Summary as of 1/29/2018     INR goal    Prior goal 1.8-2.5   Today's INR 2.5   Full instructions 1/31: 3 mg; 2/2: 3 mg; Otherwise 3 mg on Mon; 4.5 mg all other days   Next INR check 2/5/2018    Indications   LVAD (left ventricular assist device) present [Z95.811]  Long-term (current) use of anticoagulants [Z79.01] [Z79.01]         January 2018 Details    Sun Mon Tue Wed Thu Fri Sat      1               2               3               4               5               6                 7               8               9               10               11               12               13                 14               15               16               17               18               19               20                 21               22               23               24               25               26               27                 28               29      3 mg   See details      30      4.5 mg         31      3 mg             Date Details   01/29 This INR check               How to take your warfarin dose     To take:  3 mg Take 1 of the 3 mg tablets.    To take:  4.5 mg Take 1.5 of the 3 mg tablets.           February 2018 Details    Sun Mon Tue Wed Thu Fri Sat         1      4.5 mg         2      3 mg         3      4.5 mg           4      4.5 mg         5            6               7               8               9               10                 11               12               13               14               15               16               17                 18               19               20               21               22               23               24                 25               26               27               28                   Date Details   No  additional details    Date of next INR:  2/5/2018         How to take your warfarin dose     To take:  3 mg Take 1 of the 3 mg tablets.    To take:  4.5 mg Take 1.5 of the 3 mg tablets.

## 2018-01-29 NOTE — MR AVS SNAPSHOT
After Visit Summary   1/29/2018    Sohan Rendon    MRN: [id number]           Patient Information     Date Of Birth          1950        Visit Information        Provider Department      1/29/2018 12:00 PM Raghu Almodovar MD; [employer] [facility]        Today's Diagnoses     Chronic systolic congestive heart failure, NYHA class 4 (H)    -  1    LVAD (left ventricular assist device) present        CKD (chronic kidney disease) stage 4, GFR 15-29 ml/min (H)        Complication involving left ventricular assist device (LVAD), sequela        Seizure (H)        History of ischemic cerebrovascular accident (CVA) with residual deficit        Urinary retention        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, unspecified site        Long-term (current) use of anticoagulants [Z79.01]        Bilateral impacted cerumen        Slow transit constipation           Follow-ups after your visit        Your next 10 appointments already scheduled     Mar 01, 2018 10:30 AM CST   Walk In From ENT with Mathew Hannon   [facility] Audiology ([facility])    [address] [place] [id number] [phone]            Mar 01, 2018 11:15 AM CST   (Arrive by 11:00 AM)   New Patient Visit with Louie Castillo MD   [facility] Ear Nose and Throat ([facility])    [address] [place] [id number] [phone]            Mar 09, 2018 10:30 AM CST   (Arrive by 10:15 AM)   Implanted Defibulator with Uc Cv Device 1   [facility] ([facility])    [address] [place] [address]  [address] [place] [id number]   [phone]            Mar 09, 2018 11:00 AM CST   (Arrive by 10:45 AM)   Ventricular Assist Device with CLAIRE Escobar [facility] ([facility])    [address] [place] [address]  [address] [place] [address]  "[id number] [phone]              Who to contact     If you have questions or need follow up information about today's clinic visit or your schedule please contact [facility] directly at [phone].  Normal or non-critical lab and imaging results will be communicated to you by MyChart, letter or phone within 4 business days after the clinic has received the results. If you do not hear from us within 7 days, please contact the clinic through MyChart or phone. If you have a critical or abnormal lab result, we will notify you by phone as soon as possible.  Submit refill requests through [employer] or call your pharmacy and they will forward the refill request to us. Please allow 3 business days for your refill to be completed.          Additional Information About Your Visit        [employer] Information     [employer] lets you send messages to your doctor, view your test results, renew your prescriptions, schedule appointments and more. To sign up, go to [email or web address]/[employer] . Click on \"Log in\" on the left side of the screen, which will take you to the Welcome page. Then click on \"Sign up Now\" on the right side of the page.     You will be asked to enter the access code listed below, as well as some personal information. Please follow the directions to create your username and password.     Your access code is: [email or web address]  Expires: 3/12/2018  6:30 AM     Your access code will  in 90 days. If you need help or a new code, please call your [place] clinic or [phone].        Care EveryWhere ID     This is your Care EveryWhere ID. This could be used by other organizations to access your [place] medical records  [id number]         Blood Pressure from Last 3 Encounters:   01/10/18 (!) 82/0   17 98/85   17 90/65    Weight from Last 3 Encounters:   17 185 lb 10 oz (84.2 kg)   17 180 lb (81.6 kg)   17 170 lb (77.1 kg)              Today, you had the following     No orders " found for display         Today's Medication Changes          These changes are accurate as of 1/29/18 11:59 PM.  If you have any questions, ask your nurse or doctor.               Start taking these medicines.        Dose/Directions    carbamide peroxide 6.5 % otic solution   Commonly known as:  DEBROX   Used for:  Bilateral impacted cerumen   Started by:  Raghu Almodovar MD        Dose:  5-10 drop   Place 5-10 drops into both ears 2 times daily for 9 days   Quantity:  14.8 mL   Refills:  1       tamsulosin 0.4 MG capsule   Commonly known as:  FLOMAX   Used for:  Incomplete bladder emptying   Started by:  Raghu Almodovar MD        Dose:  0.8 mg   Take 2 capsules (0.8 mg) by mouth daily   Quantity:  60 capsule   Refills:  11         These medicines have changed or have updated prescriptions.        Dose/Directions    simethicone 80 MG chewable tablet   Commonly known as:  MYLICON   This may have changed:    - when to take this  - reasons to take this   Used for:  Slow transit constipation        Dose:  80 mg   Take 1 tablet (80 mg) by mouth 4 times daily   Quantity:  180 tablet   Refills:  5            Where to get your medicines      These medications were sent to [facility]pharm[facility] #[id number] - [place], [place] - [address], [place] [address]     Phone:  [phone]     carbamide peroxide 6.5 % otic solution         Some of these will need a paper prescription and others can be bought over the counter.  Ask your nurse if you have questions.     Bring a paper prescription for each of these medications     tamsulosin 0.4 MG capsule                Primary Care Provider Office Phone # Fax #    CLAIRE Rosas [phone] [address] [address]        Equal Access to Services     GIULIANA SKINNER AH: Hadii emily Rahman, jeana lutorri, qaybta kaandres rose, willie quiñonez. So [facility]  [phone].    ATENCIÓN: Si mel villagomez, tiene a smith disposición servicios gratuitos de asistencia lingüística. Dayron smith [phone].    We comply with applicable federal civil rights laws and [place] laws. We do not discriminate on the basis of race, color, national origin, age, disability, sex, sexual orientation, or gender identity.            Thank you!     Thank you for choosing [facility]  for your care. Our goal is always to provide you with excellent care. Hearing back from our patients is one way we can continue to improve our services. Please take a few minutes to complete the written survey that you may receive in the mail after your visit with us. Thank you!             Your Updated Medication List - Protect others around you: Learn how to safely use, store and throw away your medicines at www.disposemymeds.org.          This list is accurate as of 1/29/18 11:59 PM.  Always use your most recent med list.                   Brand Name Dispense Instructions for use Diagnosis    acetaminophen 325 MG tablet    TYLENOL    100 tablet    Take 2 tablets (650 mg) by mouth every 6 hours as needed for mild pain        ALEK CONTOUR test strip   Generic drug:  blood glucose monitoring     100 strip    USE TO TEST BLOOD SUGAR 2 TIMES DAILY OR AS DIRECTED.    Hypoglycemia       bisacodyl 10 MG Suppository    DULCOLAX    5 suppository    Place 1 suppository (10 mg) rectally daily as needed for constipation    Drug-induced constipation       * blood glucose monitoring lancets     1 Box    Use to test blood sugars 2 times daily or as directed.    Diabetes mellitus, type 2 (H)       * blood glucose monitoring lancets     100 each    USE TO TEST BLOOD SUGAR 2 TIMES DAILY OR AS DIRECTED    Diabetes mellitus, type 2 (H)       calcium carbonate-vitamin D 500-400 MG-UNIT Tabs per tablet     90 tablet    Take 1 tablet by mouth daily    Cardiac arrhythmia, unspecified cardiac arrhythmia type       carbamide  peroxide 6.5 % otic solution    DEBROX    14.8 mL    Place 5-10 drops into both ears 2 times daily for 9 days    Bilateral impacted cerumen       cephALEXin 500 MG capsule    KEFLEX    20 capsule    Take 1 capsule (500 mg) by mouth 2 times daily    Urinary tract infection with hematuria, site unspecified       finasteride 5 MG tablet    PROSCAR    90 tablet    Take 1 tablet (5 mg) by mouth daily    Incomplete bladder emptying       furosemide 20 MG tablet    LASIX    20 tablet    Take 1 tablet (20 mg) by mouth as needed    Chronic systolic congestive heart failure (H)       gabapentin 100 MG capsule    NEURONTIN    60 capsule    Take 1 capsule (100 mg) by mouth 2 times daily as needed    Left foot pain       ketotifen 0.025 % Soln ophthalmic solution    ZADITOR/REFRESH ANTI-ITCH    1 Bottle    Place 1 drop into both eyes 2 times daily    Allergic conjunctivitis, bilateral       levETIRAcetam 750 MG tablet    KEPPRA    180 tablet    TAKE 1 TABLET (750 MG) BY MOUTH 2 TIMES DAILY    Convulsions, unspecified convulsion type (H)       loratadine 10 MG tablet    CLARITIN    90 tablet    TAKE 1 TABLET (10 MG) BY MOUTH DAILY    Allergic rhinitis       melatonin 3 MG tablet      Take 1 tablet (3 mg) by mouth At Bedtime    Insomnia, unspecified type       nitroGLYcerin 0.4 MG sublingual tablet    NITROSTAT    30 tablet    Place 1 tablet (0.4 mg) under the tongue every 5 minutes as needed for chest pain    Coronary artery disease involving native coronary artery with unstable angina pectoris (H)       nystatin 580737 UNIT/GM Powd    MYCOSTATIN    60 g    Apply to affected areas of skin in the groin and on the scrotum three times a day as needed.    Intertriginous dermatitis associated with moisture       * order for DME     1 Device    Equipment being ordered: Lift chair.  Please see indications and face-to-face encounter details in 2/3/15 Office Visit note.    Fracture of femoral neck, right, closed, initial encounter, Stroke  (H), CHF (congestive heart failure), NYHA class IV (H)       * order for DME     2 each    Equipment being ordered: Bilateral leg supports for manual wheelchair.    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  Please fax to [employer]; I called them 11/26/16 and they requested the new order.  Face to face is in my 10/26/16 note.    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  Dx: previous stroke with left hemiplegia.  Duration of need: 99 months.    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, subsequent encounter       PANTOPRAZOLE SODIUM PO      Take 20 mg by mouth every morning (before breakfast)        ranitidine 75 MG tablet    ZANTAC    30 tablet    Take 1 tablet (75 mg) by mouth At Bedtime    Gastroesophageal reflux disease without esophagitis       SENEXON-S 8.6-50 MG per tablet   Generic drug:  senna-docusate     60 tablet    TAKE 1-2 TABLETS BY MOUTH 2 TIMES DAILY AS NEEDED FOR CONSTIPATION    Slow transit constipation       simethicone 80 MG chewable tablet    MYLICON    180 tablet    Take 1 tablet (80 mg) by mouth 4 times daily    Slow transit constipation       tamsulosin 0.4 MG capsule    FLOMAX    60 capsule    Take 2 capsules (0.8 mg) by mouth daily    Incomplete bladder emptying       thiamine 100 MG tablet     90 tablet    TAKE 1 TABLET (100 MG) BY MOUTH DAILY    Cerebrovascular accident (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 Take 25mg by mouth every evening.        warfarin 3 MG tablet    COUMADIN     Take 4.5 mg by mouth daily        * Notice:  This list has 10 medication(s) that are the same as other medications prescribed for you. Read the directions carefully, and ask your doctor or other care provider to review them with you.

## 2018-01-29 NOTE — Clinical Note
Will be doing f/u visit next Wednesday, Feb 7 at 1:00 for ear check, won't need interp,daughter does fine and simple visit

## 2018-01-30 NOTE — PROGRESS NOTES
CM met with client, his family, [facility] nurse Sofya and client's new PCP Dr. Almodovar at his home to establish care. Client continues to receive PCA services from his family. At this time he is doing well. He complained of ear pain and this was noted by his PCP. No questions or concerns for CM at this time.   Nena Salazar RN  [facility]   [phone]

## 2018-01-30 NOTE — PROGRESS NOTES
CM has been trying to reach Zari Beasley, the PHN who assessed client for services on 1/8/19. CM called her two phone numbers [phone] and [phone] on 1/29 and 1/30. Both mail boxes are full so no messages could be left with her. CM left a message with her supervisor Bridget Blackburn at [phone] requesting a call back informing her CM needs to get the [place] [employer] PCA assessment to send to [facility] for authorization of services. Client's PCA authorization ends 01/31/18.  Nena Salazar RN  [facility]   [phone]

## 2018-02-02 NOTE — PROGRESS NOTES
left with Bridget Blackburn, Supervisor (see message below) requesting a return call re this client.  Shared that client's PCA auth  18, client's CM has requested a copy of the [place] [employer] assessment that was completed on 18 in order to process the [facility] PCA authorization. Client's CM has not rec'd call back from [facility] that completed the assessment.   Rebekah Green RN, BC  Supervisor [facility]   [phone] (Fax)

## 2018-02-05 NOTE — PROGRESS NOTES
ANTICOAGULATION FOLLOW-UP CLINIC VISIT    Patient Name:  Sohan Rendon  Date:  2/5/2018  Contact Type:  Telephone    SUBJECTIVE:     Patient Findings     Positives No Problem Findings           OBJECTIVE    INR   Date Value Ref Range Status   02/05/2018 3.0  Final     Chromogenic Factor 10   Date Value Ref Range Status   08/10/2014 24 (L) 70 - 130 % Final     Comment:     Therapeutic Range:  A Chromogenic Factor 10 level of approximately 20-40%   inversely correlates with an INR of 2-3 for patients receiving Warfarin.   Chromogenic Factor 10 levels below 20% indicate an INR greater than 3 and   levels above 40% indicate an INR less than 2.       Factor 2 Assay   Date Value Ref Range Status   07/21/2014 25 (L) 60 - 140 % Final     Comment:     Analyte Specific Reagents (ASRs) are used in many laboratory tests necessary   for   standard medical care and generally do not require FDA approval.  This test   was   developed and its performance characteristics determined by [facility].  It has not been cleared or approved by   the US Food and Drug Administration.         ASSESSMENT / PLAN  INR assessment SUPRA    Recheck INR In: 1 WEEK    INR Location [facility] INR      Anticoagulation Summary as of 2/5/2018     INR goal    Prior goal 1.8-2.5   Today's INR 3.0!   Maintenance plan 3 mg (3 mg x 1) on Mon; 4.5 mg (3 mg x 1.5) all other days   Full instructions 2/6: 3 mg; Otherwise 3 mg on Mon; 4.5 mg all other days   Weekly total 30 mg   Plan last modified Blanca Bah RN (12/19/2017)   Next INR check 2/12/2018   Priority INR   Target end date Indefinite    Indications   LVAD (left ventricular assist device) present [Z95.811]  Long-term (current) use of anticoagulants [Z79.01] [Z79.01]         Anticoagulation Episode Summary     INR check location     Preferred lab     Send INR reminders to [facility]    Comments Goal Range is 1.8-2.3  [facility] comes out to  draw INR  Sofya Ph [phone]  Daughter Almaz Ph [phone]      Anticoagulation Care Providers     Provider Role Specialty Phone number    Evon Lemons MD Responsible Cardiology [phone]            See the Encounter Report to view Anticoagulation Flowsheet and Dosing Calendar (Go to Encounters tab in chart review, and find the Anticoagulation Therapy Visit)  Spoke with Yancy [facility] nurse.    Dafne Sanders RN

## 2018-02-05 NOTE — MR AVS SNAPSHOT
Sohan Rendon   2/5/2018   Anticoagulation Therapy Visit    Description:  68 year old male   Provider:  Dafne Sanders RN   Department:  [facility]           INR as of 2/5/2018     Today's INR 3.0!      Anticoagulation Summary as of 2/5/2018     INR goal    Prior goal 1.8-2.5   Today's INR 3.0!   Full instructions 2/6: 3 mg; Otherwise 3 mg on Mon; 4.5 mg all other days   Next INR check 2/12/2018    Indications   LVAD (left ventricular assist device) present [Z95.811]  Long-term (current) use of anticoagulants [Z79.01] [Z79.01]         February 2018 Details    Sun Mon Tue Wed Thu Fri Sat         1               2               3                 4               5      3 mg   See details      6      3 mg         7      4.5 mg         8      4.5 mg         9      4.5 mg         10      4.5 mg           11      4.5 mg         12            13               14               15               16               17                 18               19               20               21               22               23               24                 25               26               27               28                   Date Details   02/05 This INR check       Date of next INR:  2/12/2018         How to take your warfarin dose     To take:  3 mg Take 1 of the 3 mg tablets.    To take:  4.5 mg Take 1.5 of the 3 mg tablets.

## 2018-02-06 NOTE — PROGRESS NOTES
[facility]  Chief Complaint   Patient presents with     RECHECK     Ear        HPI:    Sohan Rendon is a 68 year old  (1950), who is being seen today for an episodic care visit at his home. This home visit is medically necessary as an office visit would require ambulance transportor , require excessive physical effort and cause pain .  HPI information obtained from: patient report and family/first contact daughter report. Acute and/or chronic conditions being managed today:  1. Bilateral impacted cerumen - patient has ear pain and prev exam showed bilat cerumen impaction. Prescribed debrox drops bid both ears for 3 days. Here for f/u check to see if cerumen and pain improved. Has ENT appoint March 1st and goal is to see if cerumen removal will solve problem and avoid very stressful ENT trip. Patient reports ears a little better already with drops        Allergies   Allergen Reactions     Seasonal Allergies        Past Medical, Surgical, Family and Social History reviewed in [facility] today.    Current Outpatient Prescriptions   Medication Sig Dispense Refill     carbamide peroxide (DEBROX) 6.5 % otic solution Place 5-10 drops into both ears 2 times daily for 8 days 8 mL 0     tamsulosin (FLOMAX) 0.4 MG capsule Take 2 capsules (0.8 mg) by mouth daily 60 capsule 11     PANTOPRAZOLE SODIUM PO Take 20 mg by mouth every morning (before breakfast)       blood glucose monitoring (ALEK MICROLET) lancets USE TO TEST BLOOD SUGAR 2 TIMES DAILY OR AS DIRECTED 100 each 2     acetaminophen (TYLENOL) 325 MG tablet Take 2 tablets (650 mg) by mouth every 6 hours as needed for mild pain 100 tablet 0     gabapentin (NEURONTIN) 100 MG capsule Take 1 capsule (100 mg) by mouth 2 times daily as needed 60 capsule 3     ranitidine (ZANTAC) 75 MG tablet Take 1 tablet (75 mg) by mouth At Bedtime (Patient not taking: Reported on 1/9/2018) 30 tablet 3     warfarin (COUMADIN) 3 MG tablet Take 4.5 mg by mouth daily       ALEK  CONTOUR test strip USE TO TEST BLOOD SUGAR 2 TIMES DAILY OR AS DIRECTED. 100 strip 2     nystatin (MYCOSTATIN) 358066 UNIT/GM POWD Apply to affected areas of skin in the groin and on the scrotum three times a day as needed. 60 g 3     loratadine (CLARITIN) 10 MG tablet TAKE 1 TABLET (10 MG) BY MOUTH DAILY 90 tablet 2     order for DME Bilateral heel protective cushioning boots.  Dx: previous stroke with left hemiplegia.  Duration of need: 99 months. 2 each 0     Metoprolol Succinate (TOPROL XL PO) Take 50mg by mouth every morning. Take 25mg by mouth every evening.       levETIRAcetam (KEPPRA) 750 MG tablet TAKE 1 TABLET (750 MG) BY MOUTH 2 TIMES DAILY 180 tablet 3     furosemide (LASIX) 20 MG tablet Take 1 tablet (20 mg) by mouth as needed 20 tablet 11     ketotifen (ZADITOR/REFRESH ANTI-ITCH) 0.025 % SOLN ophthalmic solution Place 1 drop into both eyes 2 times daily 1 Bottle 11     SENEXON-S 8.6-50 MG per tablet TAKE 1-2 TABLETS BY MOUTH 2 TIMES DAILY AS NEEDED FOR CONSTIPATION 60 tablet 3     bisacodyl (DULCOLAX) 10 MG Suppository Place 1 suppository (10 mg) rectally daily as needed for constipation 5 suppository 1     finasteride (PROSCAR) 5 MG tablet Take 1 tablet (5 mg) by mouth daily 90 tablet 3     calcium carbonate-vitamin D 500-400 MG-UNIT TABS per tablet Take 1 tablet by mouth daily 90 tablet 3     simethicone (MYLICON) 80 MG chewable tablet Take 1 tablet (80 mg) by mouth 4 times daily (Patient taking differently: Take 80 mg by mouth every 6 hours as needed ) 180 tablet 5     oxyCODONE (ROXICODONE) 5 MG IR tablet Take 1 tablet (5 mg) by mouth every 4 hours as needed for moderate to severe pain 30 tablet 0     melatonin 3 MG tablet Take 1 tablet (3 mg) by mouth At Bedtime       Thiamine HCl (VITAMIN B-1) 100 MG TABS TAKE 1 TABLET (100 MG) BY MOUTH DAILY 90 tablet 3     order for DME Equipment being ordered: Cushioned heel boots. 2 each 0     order for DME Equipment being ordered: Wheelchair, manual, with  elevated leg rests and tilt in space back.  Please fax to [facility]; I called them 11/26/16 and they requested the new order.  Face to face is in my 10/26/16 note. 1 each 0     order for DME Equipment being ordered: Wheelchair, manual. 1 each 0     order for DME Equipment being ordered: Hospital Bed, fully electric. 1 each 0     order for DME Equipment being ordered: Reclining manual w/c with bilateral elevating leg rests. 1 each 0     order for DME Equipment being ordered: Bilateral leg supports for manual wheelchair. 2 each 0     nitroglycerin (NITROSTAT) 0.4 MG SL tablet Place 1 tablet (0.4 mg) under the tongue every 5 minutes as needed for chest pain 30 tablet 1     blood glucose monitoring (NO BRAND SPECIFIED) lancets Use to test blood sugars 2 times daily or as directed. 1 Box 3     ORDER FOR DME Equipment being ordered: Lift chair.    Please see indications and face-to-face encounter details in 2/3/15 Office Visit note. 1 Device 0       REVIEW OF SYSTEMS:  4 point ROS including Respiratory, CV, GI and , other than that noted in the HPI,  is negative    There were no vitals taken for this visit.  GENERAL APPEARANCE:  Alert, in no distress, cooperative  ENT:  Mouth and posterior oropharynx normal, moist mucous membranes, right TM normal, not visualized secondary to cerumen, left TM normal, not visualized secondary to cerumen    Recent Labs:   CBC RESULTS:   Recent Labs   Lab Test  01/10/18   0939  12/12/17   1500   WBC  5.8  8.1   RBC  4.00*  3.59*   HGB  11.0*  10.4*   HCT  36.6*  34.0*   MCV  92  95   MCH  27.5  29.0   MCHC  30.1*  30.6*   RDW  18.5*  19.7*   PLT  228  265       Last Basic Metabolic Panel:  Recent Labs   Lab Test  01/10/18   0939  12/12/17   1500   NA  139  140   POTASSIUM  4.0  3.6   CHLORIDE  108  110*   JOSÉ  8.3*  8.4*   CO2  23  23   BUN  23  19   CR  1.96*  1.93*   GLC  109*  141*       Liver Function Studies -   Recent Labs   Lab Test  01/10/18   0939  11/26/17   0955   PROTTOTAL  7.8   7.6   ALBUMIN  3.4  3.3*   BILITOTAL  0.6  1.5*   ALKPHOS  122  130   AST  42  Unsatisfactory specimen - hemolyzed   ALT  14  38       TSH   Date Value Ref Range Status   08/10/2015 1.32 0.40 - 4.00 mU/L Final   01/28/2015 0.89 0.40 - 4.00 mU/L Final     Comment:     Effective 7/30/2014, the reference range for this assay has changed to reflect   new instrumentation/methodology.       Lab Results   Component Value Date    A1C 4.8 08/04/2017    A1C 5.2 07/21/2016       Assessment/Plan:  1. Bilateral impacted cerumen - patient has ear pain and prev exam showed bilat cerumen impaction. Prescribed debrox drops bid both ears for 3 days. Here for f/u check to see if cerumen and pain improved. Has ENT appoint March 1st and goal is to see if cerumen removal will solve problem and avoid very stressful ENT trip. Patient reports ears a little better already with drops- exam showed cerumen still.   Plan: continue ear drops bid both ears for another 5 days then irrigate with room temp H2O by bulb syringe to remove remaining cerumen. If this solves ear pain can cancel ENT appoint. Willl plan on recheck at next visit- approx 1 month       Next visit will be scheduled for Thursday March 1.        Electronically signed by:  Tyshawn Almodovar

## 2018-02-07 NOTE — MR AVS SNAPSHOT
After Visit Summary   2/7/2018    Sohan Rendon    MRN: [id number]           Patient Information     Date Of Birth          1950        Visit Information        Provider Department      2/7/2018 1:00 PM Raghu Almodovar MD GNP [facility]        Today's Diagnoses     Bilateral impacted cerumen    -  1      Care Instructions    Plan: continue ear drops bid both ears for another 5 days then irrigate with room temp H2O by bulb syringe to remove remaining cerumen. If this solves ear pain can cancel ENT appoint. Willl plan on recheck at next visit- approx 1 month          Follow-ups after your visit        Your next 10 appointments already scheduled     Mar 01, 2018 10:30 AM CST   Walk In From ENT with Mathew Hannon   [facility] Audiology ([facility])    [address] [place] [id number] [phone]            Mar 01, 2018 11:15 AM CST   (Arrive by 11:00 AM)   New Patient Visit with Louie Castillo MD   [facility] Ear Nose and Throat ([facility])    [address] [place] [id number] [phone]            Mar 09, 2018 10:30 AM CST   (Arrive by 10:15 AM)   Implanted Defibulator with Uc Cv Device 1   [facility] ([facility])    [address] [place] [address]  [address] [place] [id number] [phone]            Mar 09, 2018 11:00 AM CST   (Arrive by 10:45 AM)   Ventricular Assist Device with CLAIRE Escobar [facility] ([facility])    [address] [place] [address]  [address] [place] [id number] [phone]              Who to contact     If you have questions or need follow up information about today's clinic visit or your schedule please contact [facility] directly at [phone].  Normal or non-critical lab and imaging results will be communicated to you by MyChart, letter or phone  "within 4 business days after the clinic has received the results. If you do not hear from us within 7 days, please contact the clinic through [employer] or phone. If you have a critical or abnormal lab result, we will notify you by phone as soon as possible.  Submit refill requests through [employer] or call your pharmacy and they will forward the refill request to us. Please allow 3 business days for your refill to be completed.          Additional Information About Your Visit        SIMTEK Information     [employer]TEK lets you send messages to your doctor, view your test results, renew your prescriptions, schedule appointments and more. To sign up, go to [email or web address] . Click on \"Log in\" on the left side of the screen, which will take you to the Welcome page. Then click on \"Sign up Now\" on the right side of the page.     You will be asked to enter the access code listed below, as well as some personal information. Please follow the directions to create your username and password.     Your access code is: [email or web address]  Expires: 3/12/2018  6:30 AM     Your access code will  in 90 days. If you need help or a new code, please call your [facility] clinic or [phone].        Care EveryWhere ID     This is your Care EveryWhere ID. This could be used by other organizations to access your [facility] medical records  [id number]         Blood Pressure from Last 3 Encounters:   01/10/18 (!) 82/0   17 98/85   17 90/65    Weight from Last 3 Encounters:   17 185 lb 10 oz (84.2 kg)   17 180 lb (81.6 kg)   17 170 lb (77.1 kg)              Today, you had the following     No orders found for display         Today's Medication Changes          These changes are accurate as of 18 11:59 PM.  If you have any questions, ask your nurse or doctor.               These medicines have changed or have updated prescriptions.        Dose/Directions    simethicone 80 MG chewable tablet   Commonly known " as:  MYLICON   This may have changed:    - when to take this  - reasons to take this   Used for:  Slow transit constipation        Dose:  80 mg   Take 1 tablet (80 mg) by mouth 4 times daily   Quantity:  180 tablet   Refills:  5                Primary Care Provider Office Phone # Fax #    CLAIRE Rosas [phone]       [address]  [facility] [address]        Equal Access to Services     ALCON SKINNER AH: Hadii aad ku hadasho Soomaali, waaxda luqadaha, qaybta kaalmada adeegyada, waxay idiin hayaan adeeg kharash lanaya . So [facility] [phone].    ATENCIÓN: Si mel villagomez, tiene a smith disposición servicios gratuitos de asistencia lingüística. Llame al [phone].    We comply with applicable federal civil rights laws and [place] laws. We do not discriminate on the basis of race, color, national origin, age, disability, sex, sexual orientation, or gender identity.            Thank you!     Thank you for choosing [facility]  for your care. Our goal is always to provide you with excellent care. Hearing back from our patients is one way we can continue to improve our services. Please take a few minutes to complete the written survey that you may receive in the mail after your visit with us. Thank you!             Your Updated Medication List - Protect others around you: Learn how to safely use, store and throw away your medicines at www.disposemymeds.org.          This list is accurate as of 2/7/18 11:59 PM.  Always use your most recent med list.                   Brand Name Dispense Instructions for use Diagnosis    acetaminophen 325 MG tablet    TYLENOL    100 tablet    Take 2 tablets (650 mg) by mouth every 6 hours as needed for mild pain        ALEK CONTOUR test strip   Generic drug:  blood glucose monitoring     100 strip    USE TO TEST BLOOD SUGAR 2 TIMES DAILY OR AS DIRECTED.    Hypoglycemia       bisacodyl 10 MG Suppository    DULCOLAX    5 suppository    Place 1  suppository (10 mg) rectally daily as needed for constipation    Drug-induced constipation       * blood glucose monitoring lancets     1 Box    Use to test blood sugars 2 times daily or as directed.    Diabetes mellitus, type 2 (H)       * blood glucose monitoring lancets     100 each    USE TO TEST BLOOD SUGAR 2 TIMES DAILY OR AS DIRECTED    Diabetes mellitus, type 2 (H)       calcium carbonate-vitamin D 500-400 MG-UNIT Tabs per tablet     90 tablet    Take 1 tablet by mouth daily    Cardiac arrhythmia, unspecified cardiac arrhythmia type       carbamide peroxide 6.5 % otic solution    DEBROX    8 mL    Place 5-10 drops into both ears 2 times daily for 8 days    Bilateral impacted cerumen       finasteride 5 MG tablet    PROSCAR    90 tablet    Take 1 tablet (5 mg) by mouth daily    Incomplete bladder emptying       furosemide 20 MG tablet    LASIX    20 tablet    Take 1 tablet (20 mg) by mouth as needed    Chronic systolic congestive heart failure (H)       gabapentin 100 MG capsule    NEURONTIN    60 capsule    Take 1 capsule (100 mg) by mouth 2 times daily as needed    Left foot pain       ketotifen 0.025 % Soln ophthalmic solution    ZADITOR/REFRESH ANTI-ITCH    1 Bottle    Place 1 drop into both eyes 2 times daily    Allergic conjunctivitis, bilateral       levETIRAcetam 750 MG tablet    KEPPRA    180 tablet    TAKE 1 TABLET (750 MG) BY MOUTH 2 TIMES DAILY    Convulsions, unspecified convulsion type (H)       loratadine 10 MG tablet    CLARITIN    90 tablet    TAKE 1 TABLET (10 MG) BY MOUTH DAILY    Allergic rhinitis       melatonin 3 MG tablet      Take 1 tablet (3 mg) by mouth At Bedtime    Insomnia, unspecified type       nitroGLYcerin 0.4 MG sublingual tablet    NITROSTAT    30 tablet    Place 1 tablet (0.4 mg) under the tongue every 5 minutes as needed for chest pain    Coronary artery disease involving native coronary artery with unstable angina pectoris (H)       nystatin 357341 UNIT/GM Powd     MYCOSTATIN    60 g    Apply to affected areas of skin in the groin and on the scrotum three times a day as needed.    Intertriginous dermatitis associated with moisture       * order for DME     1 Device    Equipment being ordered: Lift chair.  Please see indications and face-to-face encounter details in 2/3/15 Office Visit note.    Fracture of femoral neck, right, closed, initial encounter, Stroke (H), CHF (congestive heart failure), NYHA class IV (H)       * order for DME     2 each    Equipment being ordered: Bilateral leg supports for manual wheelchair.    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  Please fax to [facility]; I called them 11/26/16 and they requested the new order.  Face to face is in my 10/26/16 note.    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), Displaced fracture of right femoral neck, closed, with nonunion, subsequent encounter       * order for DME     2 each     Equipment being ordered: Cushioned heel boots.    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  Dx: previous stroke with left hemiplegia.  Duration of need: 99 months.    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, subsequent encounter       PANTOPRAZOLE SODIUM PO      Take 20 mg by mouth every morning (before breakfast)        ranitidine 75 MG tablet    ZANTAC    30 tablet    Take 1 tablet (75 mg) by mouth At Bedtime    Gastroesophageal reflux disease without esophagitis       SENEXON-S 8.6-50 MG per tablet   Generic drug:  senna-docusate     60 tablet    TAKE 1-2 TABLETS BY MOUTH 2 TIMES DAILY AS NEEDED FOR CONSTIPATION    Slow transit constipation       simethicone 80 MG chewable tablet    MYLICON    180 tablet    Take 1 tablet (80 mg) by mouth 4 times daily    Slow transit constipation       tamsulosin 0.4 MG capsule    FLOMAX    60 capsule    Take 2 capsules (0.8 mg) by mouth daily    Incomplete bladder emptying       thiamine 100 MG tablet     90 tablet    TAKE 1 TABLET (100 MG) BY MOUTH DAILY    Cerebrovascular accident (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 Take 25mg by mouth every evening.        warfarin 3 MG tablet    COUMADIN     Take 4.5 mg by mouth daily        * Notice:  This list has 10 medication(s) that are the same as other medications prescribed for you. Read the directions carefully, and ask your doctor or other care provider to review them with you.

## 2018-02-08 NOTE — PATIENT INSTRUCTIONS
Plan: continue ear drops bid both ears for another 5 days then irrigate with room temp H2O by bulb syringe to remove remaining cerumen. If this solves ear pain can cancel ENT appoint. Willl plan on recheck at next visit- approx 1 month

## 2018-02-09 NOTE — TELEPHONE ENCOUNTER
APPT INFO    Date /Time: 3/1/18 at 11:15AM   Reason for Appt: Otalgia   Ref Provider/Clinic: Rhonda Kemp   Are there internal records? Yes/No?  IF YES, list clinic names: [facility]   Are there outside records? Yes/No? No   Patient Contact (Y/N) & Call Details: No   Action: Chart reviewed

## 2018-02-12 NOTE — MR AVS SNAPSHOT
Sohan Rendon   2/12/2018   Anticoagulation Therapy Visit    Description:  68 year old male   Provider:  Blanca Bah, RN   Department:  [facility]           INR as of 2/12/2018     Today's INR 2.1      Anticoagulation Summary as of 2/12/2018     INR goal    Prior goal 1.8-2.5   Today's INR 2.1   Full instructions 3 mg on Mon, Wed, Fri; 4.5 mg all other days   Next INR check 2/19/2018    Indications   LVAD (left ventricular assist device) present [Z95.811]  Long-term (current) use of anticoagulants [Z79.01] [Z79.01]         February 2018 Details    Sun Mon Tue Wed Thu Fri Sat         1               2               3                 4               5               6               7               8               9               10                 11               12      3 mg   See details      13      4.5 mg         14      3 mg         15      4.5 mg         16      3 mg         17      4.5 mg           18      4.5 mg         19            20               21               22               23               24                 25               26               27               28                   Date Details   02/12 This INR check       Date of next INR:  2/19/2018         How to take your warfarin dose     To take:  3 mg Take 1 of the 3 mg tablets.    To take:  4.5 mg Take 1.5 of the 3 mg tablets.

## 2018-02-12 NOTE — PROGRESS NOTES
ANTICOAGULATION FOLLOW-UP CLINIC VISIT    Patient Name:  Sohan Rendon  Date:  2/12/2018  Contact Type:  Telephone    SUBJECTIVE:     Patient Findings     Positives No Problem Findings    Comments Yancy reports that last week did 3mg Mon, Tu,Wed,Fr and 4.5mg Th, Sa,Chavez           OBJECTIVE    INR   Date Value Ref Range Status   02/12/2018 2.1  Final     Chromogenic Factor 10   Date Value Ref Range Status   08/10/2014 24 (L) 70 - 130 % Final     Comment:     Therapeutic Range:  A Chromogenic Factor 10 level of approximately 20-40%   inversely correlates with an INR of 2-3 for patients receiving Warfarin.   Chromogenic Factor 10 levels below 20% indicate an INR greater than 3 and   levels above 40% indicate an INR less than 2.       Factor 2 Assay   Date Value Ref Range Status   07/21/2014 25 (L) 60 - 140 % Final     Comment:     Analyte Specific Reagents (ASRs) are used in many laboratory tests necessary   for   standard medical care and generally do not require FDA approval.  This test   was   developed and its performance characteristics determined by [facility].  It has not been cleared or approved by   the US Food and Drug Administration.         ASSESSMENT / PLAN  INR assessment THER    Recheck INR In: 1 WEEK    INR Location Clinic      Anticoagulation Summary as of 2/12/2018     INR goal    Prior goal 1.8-2.5   Today's INR 2.1   Maintenance plan 3 mg (3 mg x 1) on Mon, Wed, Fri; 4.5 mg (3 mg x 1.5) all other days   Full instructions 3 mg on Mon, Wed, Fri; 4.5 mg all other days   Weekly total 27 mg   Plan last modified Blanca Bah RN (2/12/2018)   Next INR check 2/19/2018   Priority INR   Target end date Indefinite    Indications   LVAD (left ventricular assist device) present [Z95.811]  Long-term (current) use of anticoagulants [Z79.01] [Z79.01]         Anticoagulation Episode Summary     INR check location     Preferred lab     Send INR reminders to [facility]    Comments Goal Range is 1.8-2.3  [facility] comes out to draw INR  Sofya Ph [phone]  Daughter Almaz Ph [phone]      Anticoagulation Care Providers     Provider Role Specialty Phone number    Evon Lemons MD Responsible Cardiology [phone]            See the Encounter Report to view Anticoagulation Flowsheet and Dosing Calendar (Go to Encounters tab in chart review, and find the Anticoagulation Therapy Visit)    Spoke with Yancy MCKINNON.    Blanca Bah RN

## 2018-02-12 NOTE — PROGRESS NOTES
[facility] now requests orders and shares plan of care/discharge summaries for some patients through [employer].  Please REPLY TO THIS MESSAGE in order to give authorization for orders when needed.  This is considered a verbal order, you will still receive a faxed copy of orders for signature.  Thank you for your assistance in improving collaboration for our patients.    ORDER  Recertification orders:  Nursing 1x/week for 9 weeks and 4PRN  INR per Dr. Evon Lemons    Thanks

## 2018-02-19 NOTE — PROGRESS NOTES
ANTICOAGULATION FOLLOW-UP CLINIC VISIT    Patient Name:  Sohan Rendon  Date:  2/19/2018  Contact Type:  Telephone    SUBJECTIVE:     Patient Findings     Positives Unexplained INR or factor level change           OBJECTIVE    INR   Date Value Ref Range Status   02/19/2018 2.6  Final     Chromogenic Factor 10   Date Value Ref Range Status   08/10/2014 24 (L) 70 - 130 % Final     Comment:     Therapeutic Range:  A Chromogenic Factor 10 level of approximately 20-40%   inversely correlates with an INR of 2-3 for patients receiving Warfarin.   Chromogenic Factor 10 levels below 20% indicate an INR greater than 3 and   levels above 40% indicate an INR less than 2.       Factor 2 Assay   Date Value Ref Range Status   07/21/2014 25 (L) 60 - 140 % Final     Comment:     Analyte Specific Reagents (ASRs) are used in many laboratory tests necessary   for   standard medical care and generally do not require FDA approval.  This test   was   developed and its performance characteristics determined by [facility].  It has not been cleared or approved by   the US Food and Drug Administration.         ASSESSMENT / PLAN  INR assessment SUPRA    Recheck INR In: 1 WEEK    INR Location [facility]      Anticoagulation Summary as of 2/19/2018     INR goal    Prior goal 1.8-2.5   Today's INR 2.6!   Maintenance plan 3 mg (3 mg x 1) on Mon, Wed, Fri; 4.5 mg (3 mg x 1.5) all other days   Full instructions 2/19: 1.5 mg; Otherwise 3 mg on Mon, Wed, Fri; 4.5 mg all other days   Weekly total 27 mg   Plan last modified Blanca Bah RN (2/12/2018)   Next INR check 2/26/2018   Priority INR   Target end date Indefinite    Indications   LVAD (left ventricular assist device) present [Z95.811]  Long-term (current) use of anticoagulants [Z79.01] [Z79.01]         Anticoagulation Episode Summary     INR check location     Preferred lab     Send INR reminders to [facility]    Comments Goal  Range is 1.8-2.3  [facility] comes out to draw INR  Sofya Ph [phone]  Daughter Almaz Ph [phone]      Anticoagulation Care Providers     Provider Role Specialty Phone number    Evon Lemons MD Responsible Cardiology [phone]            See the Encounter Report to view Anticoagulation Flowsheet and Dosing Calendar (Go to Encounters tab in chart review, and find the Anticoagulation Therapy Visit)    Spoke with Yancy MCKINNON.  She reports no changes in health, diet, medications.    Sarah Osborne RN

## 2018-02-19 NOTE — MR AVS SNAPSHOT
Sohan Rendon   2/19/2018   Anticoagulation Therapy Visit    Description:  68 year old male   Provider:  Sarah Osborne, RN   Department:  [facility]           INR as of 2/19/2018     Today's INR 2.6!      Anticoagulation Summary as of 2/19/2018     INR goal    Prior goal 1.8-2.5   Today's INR 2.6!   Full instructions 2/19: 1.5 mg; Otherwise 3 mg on Mon, Wed, Fri; 4.5 mg all other days   Next INR check 2/26/2018    Indications   LVAD (left ventricular assist device) present [Z95.811]  Long-term (current) use of anticoagulants [Z79.01] [Z79.01]         February 2018 Details    Sun Mon Tue Wed Thu Fri Sat         1               2               3                 4               5               6               7               8               9               10                 11               12               13               14               15               16               17                 18               19      1.5 mg   See details      20      4.5 mg         21      3 mg         22      4.5 mg         23      3 mg         24      4.5 mg           25      4.5 mg         26            27               28                   Date Details   02/19 This INR check       Date of next INR:  2/26/2018         How to take your warfarin dose     To take:  1.5 mg Take 0.5 of a 3 mg tablet.    To take:  3 mg Take 1 of the 3 mg tablets.    To take:  4.5 mg Take 1.5 of the 3 mg tablets.

## 2018-02-21 NOTE — PROGRESS NOTES
Rec'd notice that Bridget Blackburn is out of the office until 2/27/18. Call placed to Macey [facility] to request CM of the day to return call  Rec'd tele call from Mariaelena CM of the day, explained that the need to receive [place]. Mariaelena.   Mariaelena sated that Fernando Noel is the newly assigned CM, [phone], e-mail [email or web address]  Mariaelena states that she did send an e-mail to Elvin with the information requested.   Rebekah Green RN, BC  Supervisor [facility]   [phone] (Fax)

## 2018-02-21 NOTE — PROGRESS NOTES
Client's assigned CM has not rec'd copy of [place] that was completed on 17. Client's PCA  18  Second voice message left with supervisor Bridget Blackburn at [phone] requesting a return call and a copy of the assessment to be faxed to [facility] office.   Rebekah Green RN, BC  Supervisor [facility]   [phone] (Fax)

## 2018-02-26 NOTE — PROGRESS NOTES
[facility]:  Emailed completed PCA assessment to [facility].     Afia Munroe  Case Management Specialist  [facility]  [phone]

## 2018-02-26 NOTE — MR AVS SNAPSHOT
Sohan Justice   2/26/2018   Anticoagulation Therapy Visit    Description:  68 year old male   Provider:  Sarah Osborne, RN   Department:  [facility]           INR as of 2/26/2018     Today's INR 2.2      Anticoagulation Summary as of 2/26/2018     INR goal    Prior goal 1.8-2.5   Today's INR 2.2   Full instructions 3 mg on Mon, Wed, Fri; 4.5 mg all other days   Next INR check 3/5/2018    Indications   LVAD (left ventricular assist device) present [Z95.811]  Long-term (current) use of anticoagulants [Z79.01] [Z79.01]         February 2018 Details    Sun Mon Tue Wed Thu Fri Sat         1               2               3                 4               5               6               7               8               9               10                 11               12               13               14               15               16               17                 18               19               20               21               22               23               24                 25               26      3 mg   See details      27      4.5 mg         28      3 mg             Date Details   02/26 This INR check               How to take your warfarin dose     To take:  3 mg Take 1 of the 3 mg tablets.    To take:  4.5 mg Take 1.5 of the 3 mg tablets.           March 2018 Details    Sun Mon Tue Wed Thu Fri Sat         1      4.5 mg         2      3 mg         3      4.5 mg           4      4.5 mg         5            6               7               8               9               10                 11               12               13               14               15               16               17                 18               19               20               21               22               23               24                 25               26               27               28               29               30               31                Date Details   No additional details    Date  of next INR:  3/5/2018         How to take your warfarin dose     To take:  3 mg Take 1 of the 3 mg tablets.    To take:  4.5 mg Take 1.5 of the 3 mg tablets.

## 2018-02-26 NOTE — PROGRESS NOTES
ANTICOAGULATION FOLLOW-UP CLINIC VISIT    Patient Name:  Sohan Rendon  Date:  2/26/2018  Contact Type:  Telephone    SUBJECTIVE:     Patient Findings     Positives No Problem Findings           OBJECTIVE    INR   Date Value Ref Range Status   02/26/2018 2.2  Final     Chromogenic Factor 10   Date Value Ref Range Status   08/10/2014 24 (L) 70 - 130 % Final     Comment:     Therapeutic Range:  A Chromogenic Factor 10 level of approximately 20-40%   inversely correlates with an INR of 2-3 for patients receiving Warfarin.   Chromogenic Factor 10 levels below 20% indicate an INR greater than 3 and   levels above 40% indicate an INR less than 2.       Factor 2 Assay   Date Value Ref Range Status   07/21/2014 25 (L) 60 - 140 % Final     Comment:     Analyte Specific Reagents (ASRs) are used in many laboratory tests necessary   for   standard medical care and generally do not require FDA approval.  This test   was   developed and its performance characteristics determined by [facility].  It has not been cleared or approved by   the US Food and Drug Administration.         ASSESSMENT / PLAN  INR assessment THER    Recheck INR In: 1 WEEK    INR Location [facility]      Anticoagulation Summary as of 2/26/2018     INR goal    Prior goal 1.8-2.5   Today's INR 2.2   Maintenance plan 3 mg (3 mg x 1) on Mon, Wed, Fri; 4.5 mg (3 mg x 1.5) all other days   Full instructions 3 mg on Mon, Wed, Fri; 4.5 mg all other days   Weekly total 27 mg   No change documented Sarah Osborne RN   Plan last modified Blanca Bah RN (2/12/2018)   Next INR check 3/5/2018   Priority INR   Target end date Indefinite    Indications   LVAD (left ventricular assist device) present [Z95.811]  Long-term (current) use of anticoagulants [Z79.01] [Z79.01]         Anticoagulation Episode Summary     INR check location     Preferred lab     Send INR reminders to [facility]    Comments Goal  Range is 1.8-2.3  [facility] comes out to draw INR  Sofya Ph [phone]  Daughter Almaz Ph [phone]      Anticoagulation Care Providers     Provider Role Specialty Phone number    Evon Lemons MD Responsible Cardiology [phone]            See the Encounter Report to view Anticoagulation Flowsheet and Dosing Calendar (Go to Encounters tab in chart review, and find the Anticoagulation Therapy Visit)    Spoke with Yancy MCKINNON.  She reports no changes in health, diet, medications.    Sarah Osborne RN

## 2018-03-01 NOTE — MR AVS SNAPSHOT
After Visit Summary   3/1/2018    Sohan Rendon    MRN: [id number]           Patient Information     Date Of Birth          1950        Visit Information        Provider Department      3/1/2018 10:30 AM Aliza Argueta AuD; [employer] [facility]        Today's Diagnoses     Sensorineural hearing loss (SNHL) of both ears    -  1       Follow-ups after your visit        Your next 10 appointments already scheduled     Mar 09, 2018 10:30 AM CST   (Arrive by 10:15 AM)   Implanted Defibulator with Uc Cv Device 1   [facility] ([facility])    [address]  [address]  [place] [address]   [phone]            Mar 09, 2018 11:00 AM CST   (Arrive by 10:45 AM)   Ventricular Assist Device with CLAIRE Escobar [facility] ([facility])    [address]  [address]  [place] [address]   [phone]            Mar 16, 2018 12:00 PM CDT   Home Follow Up with Raghu Almodovar MD   [facility] ([facility])    [address] [place] [address]   [phone]            Apr 20, 2018 10:00 AM CDT   (Arrive by 9:45 AM)   NEW THROAT with Nora Winter MD   [facility] ([facility])    [address]  4th [address]  [place] [address] [phone]           This is a multi-disciplinary care team visit as patients with your type of problem are usually seen by a team of an MD and a Speech-Language Pathologist (who is a specialist in disorders of the voice, throat, and breathing).  Please plan about 2 hours for your visit, which will likely include Laryngeal Function Studies, a Voice/Swallow/Breathing Evaluation, and an Endoscopic Laryngeal Examination to provide a comprehensive evaluation.  Please check with your insurance company to ensure you are covered for these services. -  It is important to know that if you are evaluated and/or treated by both a physician and a speech pathologist during your visit, your billing will reflect the input that you receive from both providers as separate professionals. Although most insurance plans do cover these services, we encourage you to contact your insurance company prior to your visit to determine whether there are any coverage limitations that might affect you financially. - Billing/procedure codes that are frequently associated with visits to our clinic include (but are not limited to) the ones listed below. Most patients will not need all of these items, but it may be useful to ask your insurance company's patient . 92313: Flexible fiberoptic laryngoscopy, 45771: Laryngoscopy; flexible or rigid fiberoptic, with stroboscopy, 65129: Flexible endoscopic evaluation of swallowing, 93637: Laryngeal function aerodynamic evaluation, 17858: Evaluation of Voice and Resonance, 76708: Speech pathology treatment for voice, speech, communication, 15170: Speech pathology treatment for swallowing/oral function for feeding - If you have any concerns or questions, or if you would prefer not to have a speech pathologist involved in your visit, please contact our Clinic Coordinator at [phone], at least 24 hours prior to your appointment.              Who to contact     Please call your clinic at [phone] to:    Ask questions about your health    Make or cancel appointments    Discuss your medicines    Learn about your test results    Speak to your doctor            Additional Information About Your Visit        MyChart Information     [employer] is an electronic gateway that provides easy, online access to your medical records. With [employer], you can request a clinic appointment, read your test results, renew a prescription or communicate with your care team.     To sign up for [employer] visit the website at [email or web address]    You will be asked to enter the access code listed below, as well as some personal information. Please follow the directions to create your username and password.     Your access code is: [email or web address]  Expires: 3/12/2018  6:30 AM     Your access code will  in 90 days. If you need help or a new code, please contact your [facility] or call [phone] for assistance.        Care EveryWhere ID     This is your Care EveryWhere ID. This could be used by other organizations to access your [facility] medical records  [id number]         Blood Pressure from Last 3 Encounters:   01/10/18 (!) 82/0   17 98/85   17 90/65    Weight from Last 3 Encounters:   17 84.2 kg (185 lb 10 oz)   17 81.6 kg (180 lb)   17 77.1 kg (170 lb)              We Performed the Following     AUDIOGRAM/TYMPANOGRAM - INTERFACE     [facility] Audiometry Thrshld Eval & Speech Recog (96900)     Tympanometry (impedance - testing) (40901)          Today's Medication Changes          These changes are accurate as of 3/1/18  4:24 PM.  If you have any questions, ask your nurse or doctor.               These medicines have changed or have updated prescriptions.        Dose/Directions    simethicone 80 MG chewable tablet   Commonly known as:  MYLICON   This may have changed:    - when to take this  - reasons to take this   Used for:  Slow transit constipation        Dose:  80 mg   Take 1 tablet (80 mg) by mouth 4 times daily   Quantity:  180 tablet   Refills:  5                Primary Care Provider Office Phone # Fax #    Shilpi Agosto, APRN [phone]       [address]  [facility] [address]        Equal Access to Services     ALCON SKINNER AH: Rocky matuteo Soedna, waaxda luqadaha, qaybta kaalmada adeegyada, willie quiñonez. So [facility] [phone].    ATENCIÓN: Si habla español, tiene a smith disposición servicios gratuitos de asistencia  lingüísticaErich Padgett al [phone].    We comply with applicable federal civil rights laws and [place] laws. We do not discriminate on the basis of race, color, national origin, age, disability, sex, sexual orientation, or gender identity.            Thank you!     Thank you for choosing [facility]  for your care. Our goal is always to provide you with excellent care. Hearing back from our patients is one way we can continue to improve our services. Please take a few minutes to complete the written survey that you may receive in the mail after your visit with us. Thank you!             Your Updated Medication List - Protect others around you: Learn how to safely use, store and throw away your medicines at www.disposemymeds.org.          This list is accurate as of 3/1/18  4:24 PM.  Always use your most recent med list.                   Brand Name Dispense Instructions for use Diagnosis    acetaminophen 325 MG tablet    TYLENOL    100 tablet    Take 2 tablets (650 mg) by mouth every 6 hours as needed for mild pain        ALEK CONTOUR test strip   Generic drug:  blood glucose monitoring     100 strip    USE TO TEST BLOOD SUGAR 2 TIMES DAILY OR AS DIRECTED.    Hypoglycemia       bisacodyl 10 MG Suppository    DULCOLAX    5 suppository    Place 1 suppository (10 mg) rectally daily as needed for constipation    Drug-induced constipation       * blood glucose monitoring lancets     1 Box    Use to test blood sugars 2 times daily or as directed.    Diabetes mellitus, type 2 (H)       * blood glucose monitoring lancets     100 each    USE TO TEST BLOOD SUGAR 2 TIMES DAILY OR AS DIRECTED    Diabetes mellitus, type 2 (H)       calcium carbonate-vitamin D 500-400 MG-UNIT Tabs per tablet     90 tablet    Take 1 tablet by mouth daily    Cardiac arrhythmia, unspecified cardiac arrhythmia type       finasteride 5 MG tablet    PROSCAR    90 tablet    Take 1 tablet (5 mg) by mouth daily    Incomplete bladder emptying        furosemide 20 MG tablet    LASIX    20 tablet    Take 1 tablet (20 mg) by mouth as needed    Chronic systolic congestive heart failure (H)       gabapentin 100 MG capsule    NEURONTIN    60 capsule    Take 1 capsule (100 mg) by mouth 2 times daily as needed    Left foot pain       ketotifen 0.025 % Soln ophthalmic solution    ZADITOR/REFRESH ANTI-ITCH    1 Bottle    Place 1 drop into both eyes 2 times daily    Allergic conjunctivitis, bilateral       levETIRAcetam 750 MG tablet    KEPPRA    180 tablet    TAKE 1 TABLET (750 MG) BY MOUTH 2 TIMES DAILY    Convulsions, unspecified convulsion type (H)       loratadine 10 MG tablet    CLARITIN    90 tablet    TAKE 1 TABLET (10 MG) BY MOUTH DAILY    Allergic rhinitis       melatonin 3 MG tablet      Take 1 tablet (3 mg) by mouth At Bedtime    Insomnia, unspecified type       nitroGLYcerin 0.4 MG sublingual tablet    NITROSTAT    30 tablet    Place 1 tablet (0.4 mg) under the tongue every 5 minutes as needed for chest pain    Coronary artery disease involving native coronary artery with unstable angina pectoris (H)       nystatin 051586 UNIT/GM Powd    MYCOSTATIN    60 g    Apply to affected areas of skin in the groin and on the scrotum three times a day as needed.    Intertriginous dermatitis associated with moisture       * order for DME     1 Device    Equipment being ordered: Lift chair.  Please see indications and face-to-face encounter details in 2/3/15 Office Visit note.    Fracture of femoral neck, right, closed, initial encounter, Stroke (H), CHF (congestive heart failure), NYHA class IV (H)       * order for DME     2 each    Equipment being ordered: Bilateral leg supports for manual wheelchair.    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  Please fax to [employer]; I called them 11/26/16 and they requested the new order.  Face to face is in my 10/26/16 note.    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  Dx: previous stroke with left hemiplegia.  Duration of need: 99 months.    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, subsequent encounter       PANTOPRAZOLE SODIUM PO      Take 20 mg by mouth every morning (before breakfast)        ranitidine 75 MG tablet    ZANTAC    30 tablet    Take 1 tablet (75 mg) by mouth At Bedtime    Gastroesophageal reflux disease without esophagitis       SENEXON-S  8.6-50 MG per tablet   Generic drug:  senna-docusate     60 tablet    TAKE 1-2 TABLETS BY MOUTH 2 TIMES DAILY AS NEEDED FOR CONSTIPATION    Slow transit constipation       simethicone 80 MG chewable tablet    MYLICON    180 tablet    Take 1 tablet (80 mg) by mouth 4 times daily    Slow transit constipation       tamsulosin 0.4 MG capsule    FLOMAX    60 capsule    Take 2 capsules (0.8 mg) by mouth daily    Incomplete bladder emptying       thiamine 100 MG tablet     90 tablet    TAKE 1 TABLET (100 MG) BY MOUTH DAILY    Cerebrovascular accident (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 Take 25mg by mouth every evening.        warfarin 3 MG tablet    COUMADIN     Take 4.5 mg by mouth daily        * Notice:  This list has 10 medication(s) that are the same as other medications prescribed for you. Read the directions carefully, and ask your doctor or other care provider to review them with you.

## 2018-03-01 NOTE — PROGRESS NOTES
AUDIOLOGY REPORT    SUMMARY: Audiology visit completed. See audiogram for results.      RECOMMENDATIONS: Follow-up with ENT.    Charity Estrada.  Licensed Audiologist  [place] #[id number]

## 2018-03-01 NOTE — LETTER
3/1/2018       RE: Sohan Rendon  [address]   [facility] [id number]     Dear Colleague,    Thank you for referring your patient, Sohan Rendon, to the [facility] [place] [facility] at [facility]. Please see a copy of my visit note below.    The patient presents with a history of chronic hoarseness, throat irritation and hearing loss. The patient was recently treated with otic drops for cerumen in the ear canals.  The patient's family reports that he has gastroesophageal reflux symptoms or heartburn.  The patient denies dysphagia, odynophagia, hemoptysis, hematemasis, or unexplained weight loss.  The patient is not using any oral or nasal inhalent medications.  The patient denies events of respiratory distress.  The patient denies upper respiratory infections or post-nasal drainage. He recently had a stroke. The patient's family denies sinusitis, rhinitis, facial pain, nasal obstruction or purulent nasal discharge. The patient's family denies chronic or recurrent tonsillitis, chronic or recurrent pharyngitis. His Audiogram and Tympanogram are reviewed with his family and they demonstrate mild to moderate symmetric sensorineural hearing loss with 100% word recognition scores and normal tympanograms.     This patient is seen in consultation at the request of Dr. Rhonda Kemp.     All other systems were reviewed and they are either negative or they are not directly pertinent to this Otolaryngology examination.      Past Medical History:    Past Medical History:   Diagnosis Date     Acute right MCA stroke (H) 6/2013    With L sided hemiparesis      Anemia of chronic disease     Baseline Hb 8-9     BPH (benign prostatic hyperplasia)      CHF (congestive heart failure), NYHA class IV (H) 10/9/2012    s/p HeartMate II.  Was on milrinone and dobutamine prior to LVAD      CKD (chronic kidney disease)     Baseline Cr=     Clostridium difficile colitis 12/2012      Dysphagia      PEG tube placed in 2012.  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  Normal carotid duplex 3/2014.       Esophagitis 12/2012    EGD with esophagitis and multiple douenal ulcers     Fracture of femoral neck, right, closed (H) 2/3/2015    Presumed pathologic as patient is non-weight-bearing and suffered no trauma.  Family declined operative repair during hospital stay 1/23 - 1/30/15.     Gastric and duodenal angiodysplasia with hemorrhage 6/18/2015     GERD (gastroesophageal reflux disease)      Gout      Hematuria      HTN (hypertension)     LDL=59 (3/29/2014)     Hyperlipaemia      Ischemic cardiomyopathy      Mitral regurgitation     s/p MVR with Carbomedics ring      Myocardial infarction 1998    In [facility] without intervention      Nonsenile cataract     BE     PFO (patent foramen ovale)     s/p closure (10/9/2012)     TB lung, latent     s/p 9 months INH in 2012        Past Surgical History:  Past Surgical History:   Procedure Laterality Date     C CABG, VEIN, FIVE  2001     CATARACT IOL, RT/LT Right 11/19/2015     ENDOSCOPIC RETROGRADE CHOLANGIOPANCREATOGRAM N/A 5/9/2017    Procedure: COMBINED ENDOSCOPIC RETROGRADE CHOLANGIOPANCREATOGRAPHY, PLACE TUBE/STENT;  Endoscopic Retrograde Cholangiopancreatogram, Stent (Zimmon Biliary 7fr 12cm) Placement;  Surgeon: Cristo Grove MD;  Location: [facility]     ESOPHAGOSCOPY, GASTROSCOPY, DUODENOSCOPY (EGD), COMBINED N/A 6/10/2015    Procedure: COMBINED ESOPHAGOSCOPY, GASTROSCOPY, DUODENOSCOPY (EGD);  Surgeon: Edu Jenkins MD;  Location: [facility]     ESOPHAGOSCOPY, GASTROSCOPY, DUODENOSCOPY (EGD), COMBINED N/A 6/15/2015    Procedure: COMBINED ESOPHAGOSCOPY, GASTROSCOPY, DUODENOSCOPY (EGD);  Surgeon: Yury Bonner MD;  Location: [facility] OR     H INSERT ICD  2/10/2011    And pacemaker.  Not BiV     INSERT VENTRICULAR ASSIST DEVICE LEFT (HEARTMATE II)  10/9/2012     IR  GASTRO JEJUNOSTOMY TUBE PLACEMENT       PHACOEMULSIFICATION CLEAR CORNEA WITH STANDARD INTRAOCULAR LENS IMPLANT Right 11/19/2015    Procedure: PHACOEMULSIFICATION CLEAR CORNEA WITH STANDARD INTRAOCULAR LENS IMPLANT;  Surgeon: Violeta Cosme MD;  Location: [facility]     REPAIR PATENT FORAMEN OVALE  10/9/2012     REPAIR VALVE MITRAL  2/9/2012    28 mm Carbomedics 2/3 ring      Medications:  Current Outpatient Prescriptions:      tamsulosin (FLOMAX) 0.4 MG capsule, Take 2 capsules (0.8 mg) by mouth daily, Disp: 60 capsule, Rfl: 11     PANTOPRAZOLE SODIUM PO, Take 20 mg by mouth every morning (before breakfast), Disp: , Rfl:      blood glucose monitoring (ALEK MICROLET) lancets, USE TO TEST BLOOD SUGAR 2 TIMES DAILY OR AS DIRECTED, Disp: 100 each, Rfl: 2     acetaminophen (TYLENOL) 325 MG tablet, Take 2 tablets (650 mg) by mouth every 6 hours as needed for mild pain, Disp: 100 tablet, Rfl: 0     gabapentin (NEURONTIN) 100 MG capsule, Take 1 capsule (100 mg) by mouth 2 times daily as needed, Disp: 60 capsule, Rfl: 3     ranitidine (ZANTAC) 75 MG tablet, Take 1 tablet (75 mg) by mouth At Bedtime (Patient not taking: Reported on 1/9/2018), Disp: 30 tablet, Rfl: 3     warfarin (COUMADIN) 3 MG tablet, Take 4.5 mg by mouth daily, Disp: , Rfl:      ALEK CONTOUR test strip, USE TO TEST BLOOD SUGAR 2 TIMES DAILY OR AS DIRECTED., Disp: 100 strip, Rfl: 2     nystatin (MYCOSTATIN) 308135 UNIT/GM POWD, Apply to affected areas of skin in the groin and on the scrotum three times a day as needed., Disp: 60 g, Rfl: 3     loratadine (CLARITIN) 10 MG tablet, TAKE 1 TABLET (10 MG) BY MOUTH DAILY, Disp: 90 tablet, Rfl: 2     order for DME, Bilateral heel protective cushioning boots.  Dx: previous stroke with left hemiplegia.  Duration of need: 99 months., Disp: 2 each, Rfl: 0     Metoprolol Succinate (TOPROL XL PO), Take 50mg by mouth every morning. Take 25mg by mouth every evening., Disp: , Rfl:      levETIRAcetam (KEPPRA) 750 MG tablet,  TAKE 1 TABLET (750 MG) BY MOUTH 2 TIMES DAILY, Disp: 180 tablet, Rfl: 3     furosemide (LASIX) 20 MG tablet, Take 1 tablet (20 mg) by mouth as needed, Disp: 20 tablet, Rfl: 11     ketotifen (ZADITOR/REFRESH ANTI-ITCH) 0.025 % SOLN ophthalmic solution, Place 1 drop into both eyes 2 times daily, Disp: 1 Bottle, Rfl: 11     SENEXON-S 8.6-50 MG per tablet, TAKE 1-2 TABLETS BY MOUTH 2 TIMES DAILY AS NEEDED FOR CONSTIPATION, Disp: 60 tablet, Rfl: 3     bisacodyl (DULCOLAX) 10 MG Suppository, Place 1 suppository (10 mg) rectally daily as needed for constipation, Disp: 5 suppository, Rfl: 1     finasteride (PROSCAR) 5 MG tablet, Take 1 tablet (5 mg) by mouth daily, Disp: 90 tablet, Rfl: 3     calcium carbonate-vitamin D 500-400 MG-UNIT TABS per tablet, Take 1 tablet by mouth daily, Disp: 90 tablet, Rfl: 3     simethicone (MYLICON) 80 MG chewable tablet, Take 1 tablet (80 mg) by mouth 4 times daily (Patient taking differently: Take 80 mg by mouth every 6 hours as needed ), Disp: 180 tablet, Rfl: 5     oxyCODONE (ROXICODONE) 5 MG IR tablet, Take 1 tablet (5 mg) by mouth every 4 hours as needed for moderate to severe pain, Disp: 30 tablet, Rfl: 0     melatonin 3 MG tablet, Take 1 tablet (3 mg) by mouth At Bedtime, Disp: , Rfl:      Thiamine HCl (VITAMIN B-1) 100 MG TABS, TAKE 1 TABLET (100 MG) BY MOUTH DAILY, Disp: 90 tablet, Rfl: 3     order for DME, Equipment being ordered: Cushioned heel boots., Disp: 2 each, Rfl: 0     order for DME, Equipment being ordered: Wheelchair, manual, with elevated leg rests and tilt in space back.  Please fax to [facility]; I called them 11/26/16 and they requested the new order.  Face to face is in my 10/26/16 note., Disp: 1 each, Rfl: 0     order for DME, Equipment being ordered: Wheelchair, manual., Disp: 1 each, Rfl: 0     order for DME, Equipment being ordered: Hospital Bed, fully electric., Disp: 1 each, Rfl: 0     order for DME, Equipment being ordered: Reclining manual w/c with bilateral  elevating leg rests., Disp: 1 each, Rfl: 0     order for DME, Equipment being ordered: Bilateral leg supports for manual wheelchair., Disp: 2 each, Rfl: 0     nitroglycerin (NITROSTAT) 0.4 MG SL tablet, Place 1 tablet (0.4 mg) under the tongue every 5 minutes as needed for chest pain, Disp: 30 tablet, Rfl: 1     blood glucose monitoring (NO BRAND SPECIFIED) lancets, Use to test blood sugars 2 times daily or as directed., Disp: 1 Box, Rfl: 3     ORDER FOR DME, Equipment being ordered: Lift chair.  Please see indications and face-to-face encounter details in 2/3/15 Office Visit note., Disp: 1 Device, Rfl: 0    Allergies:  Seasonal allergies    Physical Examination:  The patient is a well developed, well nourished male in no apparent distress.  He is normocephalic, atraumatic with pupils equally round and reactive to light.    Oral Cavity Examination:  Normal mucosa with no masses or lesions  Nasal Examination: Normal mucosa with no masses or lesions  Ear Examination: Ear canal on the left is clear and the ear canal on the right has minimal cerumen, tympanic membranes and middle ear spaces normal  Neurological Examination: Facial nerve function intact and symmetric  Integumentary Examination: No lesions on the skin of the head and neck  Neck Examination: No masses or lesions, no lymphadenopathy  Endocrine Examination: Normal thyroid examination  Flexible Fiberoptic Laryngoscopy: Normal nasopharynx, base of tongue, valleculae, pyriform sinuses, false vocal cords and true vocal cords.  The vocal cords are moving normally and there are no lesions or masses in the larynx.  The posterior vocal cords and laryngeal wall are erythematous diffusely.    Assessment and Plan:  The patient presents with a history of hoarseness and sensorineural hearing loss. The patient will be referred for a consultation with Dr. Marquise Campo or Dr. Nora Winter for further evaluation and management of his larynx. He will complete the  current use of otic drops in the right ear to removal all residual cerumen.     CC: Dr. Rhonda Kemp    Again, thank you for allowing me to participate in the care of your patient.      Sincerely,    Louie Castillo MD

## 2018-03-01 NOTE — PATIENT INSTRUCTIONS
The patient presents with a history of hoarseness and sensorineural hearing loss. The patient will be referred for a consultation with Dr. Marquise Campo or Dr. Nora Winter for further evaluation and management of his larynx. He will complete the current use of otic drops in the right ear to removal all residual cerumen.

## 2018-03-01 NOTE — PROGRESS NOTES
The patient presents with a history of chronic hoarseness, throat irritation and hearing loss. The patient was recently treated with otic drops for cerumen in the ear canals.  The patient's family reports that he has gastroesophageal reflux symptoms or heartburn.  The patient denies dysphagia, odynophagia, hemoptysis, hematemasis, or unexplained weight loss.  The patient is not using any oral or nasal inhalent medications.  The patient denies events of respiratory distress.  The patient denies upper respiratory infections or post-nasal drainage. He recently had a stroke. The patient's family denies sinusitis, rhinitis, facial pain, nasal obstruction or purulent nasal discharge. The patient's family denies chronic or recurrent tonsillitis, chronic or recurrent pharyngitis. His Audiogram and Tympanogram are reviewed with his family and they demonstrate mild to moderate symmetric sensorineural hearing loss with 100% word recognition scores and normal tympanograms.     This patient is seen in consultation at the request of Dr. Rhonda Kemp.     All other systems were reviewed and they are either negative or they are not directly pertinent to this Otolaryngology examination.      Past Medical History:    Past Medical History:   Diagnosis Date     Acute right MCA stroke (H) 6/2013    With L sided hemiparesis      Anemia of chronic disease     Baseline Hb 8-9     BPH (benign prostatic hyperplasia)      CHF (congestive heart failure), NYHA class IV (H) 10/9/2012    s/p HeartMate II.  Was on milrinone and dobutamine prior to LVAD      CKD (chronic kidney disease)     Baseline Cr=     Clostridium difficile colitis 12/2012      Dysphagia     PEG tube placed in 2012.  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  Normal carotid duplex 3/2014.       Esophagitis 12/2012    EGD with esophagitis and multiple douenal ulcers     Fracture  of femoral neck, right, closed (H) 2/3/2015    Presumed pathologic as patient is non-weight-bearing and suffered no trauma.  Family declined operative repair during hospital stay 1/23 - 1/30/15.     Gastric and duodenal angiodysplasia with hemorrhage 6/18/2015     GERD (gastroesophageal reflux disease)      Gout      Hematuria      HTN (hypertension)     LDL=59 (3/29/2014)     Hyperlipaemia      Ischemic cardiomyopathy      Mitral regurgitation     s/p MVR with Carbomedics ring      Myocardial infarction 1998    In [facility] without intervention      Nonsenile cataract     BE     PFO (patent foramen ovale)     s/p closure (10/9/2012)     TB lung, latent     s/p 9 months INH in 2012        Past Surgical History:    Past Surgical History:   Procedure Laterality Date     C CABG, VEIN, FIVE  2001     CATARACT IOL, RT/LT Right 11/19/2015     ENDOSCOPIC RETROGRADE CHOLANGIOPANCREATOGRAM N/A 5/9/2017    Procedure: COMBINED ENDOSCOPIC RETROGRADE CHOLANGIOPANCREATOGRAPHY, PLACE TUBE/STENT;  Endoscopic Retrograde Cholangiopancreatogram, Stent (Zimmon Biliary 7fr 12cm) Placement;  Surgeon: Cristo Grove MD;  [facility]     ESOPHAGOSCOPY, GASTROSCOPY, DUODENOSCOPY (EGD), COMBINED N/A 6/10/2015    Procedure: COMBINED ESOPHAGOSCOPY, GASTROSCOPY, DUODENOSCOPY (EGD);  Surgeon: Edu Jenkins MD;  [facility]: [facility]     ESOPHAGOSCOPY, GASTROSCOPY, DUODENOSCOPY (EGD), COMBINED N/A 6/15/2015    Procedure: COMBINED ESOPHAGOSCOPY, GASTROSCOPY, DUODENOSCOPY (EGD);  Surgeon: Yury Bonner MD;  Location: [facility]     H INSERT ICD  2/10/2011    And pacemaker.  Not BiV     INSERT VENTRICULAR ASSIST DEVICE LEFT (HEARTMATE II)  10/9/2012     IR GASTRO JEJUNOSTOMY TUBE PLACEMENT       PHACOEMULSIFICATION CLEAR CORNEA WITH STANDARD INTRAOCULAR LENS IMPLANT Right 11/19/2015    Procedure: PHACOEMULSIFICATION CLEAR CORNEA WITH STANDARD INTRAOCULAR LENS IMPLANT;  Surgeon: Violeta Cosme MD;  Location: [facility]     REPAIR PATENT FORAMEN  OVALE  10/9/2012     REPAIR VALVE MITRAL  2/9/2012    28 mm Carbomedics 2/3 ring        Medications:      Current Outpatient Prescriptions:      tamsulosin (FLOMAX) 0.4 MG capsule, Take 2 capsules (0.8 mg) by mouth daily, Disp: 60 capsule, Rfl: 11     PANTOPRAZOLE SODIUM PO, Take 20 mg by mouth every morning (before breakfast), Disp: , Rfl:      blood glucose monitoring (ALEK MICROLET) lancets, USE TO TEST BLOOD SUGAR 2 TIMES DAILY OR AS DIRECTED, Disp: 100 each, Rfl: 2     acetaminophen (TYLENOL) 325 MG tablet, Take 2 tablets (650 mg) by mouth every 6 hours as needed for mild pain, Disp: 100 tablet, Rfl: 0     gabapentin (NEURONTIN) 100 MG capsule, Take 1 capsule (100 mg) by mouth 2 times daily as needed, Disp: 60 capsule, Rfl: 3     ranitidine (ZANTAC) 75 MG tablet, Take 1 tablet (75 mg) by mouth At Bedtime (Patient not taking: Reported on 1/9/2018), Disp: 30 tablet, Rfl: 3     warfarin (COUMADIN) 3 MG tablet, Take 4.5 mg by mouth daily, Disp: , Rfl:      ALEK CONTOUR test strip, USE TO TEST BLOOD SUGAR 2 TIMES DAILY OR AS DIRECTED., Disp: 100 strip, Rfl: 2     nystatin (MYCOSTATIN) 530888 UNIT/GM POWD, Apply to affected areas of skin in the groin and on the scrotum three times a day as needed., Disp: 60 g, Rfl: 3     loratadine (CLARITIN) 10 MG tablet, TAKE 1 TABLET (10 MG) BY MOUTH DAILY, Disp: 90 tablet, Rfl: 2     order for DME, Bilateral heel protective cushioning boots.  Dx: previous stroke with left hemiplegia.  Duration of need: 99 months., Disp: 2 each, Rfl: 0     Metoprolol Succinate (TOPROL XL PO), Take 50mg by mouth every morning. Take 25mg by mouth every evening., Disp: , Rfl:      levETIRAcetam (KEPPRA) 750 MG tablet, TAKE 1 TABLET (750 MG) BY MOUTH 2 TIMES DAILY, Disp: 180 tablet, Rfl: 3     furosemide (LASIX) 20 MG tablet, Take 1 tablet (20 mg) by mouth as needed, Disp: 20 tablet, Rfl: 11     ketotifen (ZADITOR/REFRESH ANTI-ITCH) 0.025 % SOLN ophthalmic solution, Place 1 drop into both eyes 2  times daily, Disp: 1 Bottle, Rfl: 11     SENEXON-S 8.6-50 MG per tablet, TAKE 1-2 TABLETS BY MOUTH 2 TIMES DAILY AS NEEDED FOR CONSTIPATION, Disp: 60 tablet, Rfl: 3     bisacodyl (DULCOLAX) 10 MG Suppository, Place 1 suppository (10 mg) rectally daily as needed for constipation, Disp: 5 suppository, Rfl: 1     finasteride (PROSCAR) 5 MG tablet, Take 1 tablet (5 mg) by mouth daily, Disp: 90 tablet, Rfl: 3     calcium carbonate-vitamin D 500-400 MG-UNIT TABS per tablet, Take 1 tablet by mouth daily, Disp: 90 tablet, Rfl: 3     simethicone (MYLICON) 80 MG chewable tablet, Take 1 tablet (80 mg) by mouth 4 times daily (Patient taking differently: Take 80 mg by mouth every 6 hours as needed ), Disp: 180 tablet, Rfl: 5     oxyCODONE (ROXICODONE) 5 MG IR tablet, Take 1 tablet (5 mg) by mouth every 4 hours as needed for moderate to severe pain, Disp: 30 tablet, Rfl: 0     melatonin 3 MG tablet, Take 1 tablet (3 mg) by mouth At Bedtime, Disp: , Rfl:      Thiamine HCl (VITAMIN B-1) 100 MG TABS, TAKE 1 TABLET (100 MG) BY MOUTH DAILY, Disp: 90 tablet, Rfl: 3     order for DME, Equipment being ordered: Cushioned heel boots., Disp: 2 each, Rfl: 0     order for DME, Equipment being ordered: Wheelchair, manual, with elevated leg rests and tilt in space back.  Please fax to [facility]; I called them 11/26/16 and they requested the new order.  Face to face is in my 10/26/16 note., Disp: 1 each, Rfl: 0     order for DME, Equipment being ordered: Wheelchair, manual., Disp: 1 each, Rfl: 0     order for DME, Equipment being ordered: Hospital Bed, fully electric., Disp: 1 each, Rfl: 0     order for DME, Equipment being ordered: Reclining manual w/c with bilateral elevating leg rests., Disp: 1 each, Rfl: 0     order for DME, Equipment being ordered: Bilateral leg supports for manual wheelchair., Disp: 2 each, Rfl: 0     nitroglycerin (NITROSTAT) 0.4 MG SL tablet, Place 1 tablet (0.4 mg) under the tongue every 5 minutes as needed for chest  pain, Disp: 30 tablet, Rfl: 1     blood glucose monitoring (NO BRAND SPECIFIED) lancets, Use to test blood sugars 2 times daily or as directed., Disp: 1 Box, Rfl: 3     ORDER FOR DME, Equipment being ordered: Lift chair.  Please see indications and face-to-face encounter details in 2/3/15 Office Visit note., Disp: 1 Device, Rfl: 0    Allergies:    Seasonal allergies    Physical Examination:    The patient is a well developed, well nourished male in no apparent distress.  He is normocephalic, atraumatic with pupils equally round and reactive to light.    Oral Cavity Examination:  Normal mucosa with no masses or lesions  Nasal Examination: Normal mucosa with no masses or lesions  Ear Examination: Ear canal on the left is clear and the ear canal on the right has minimal cerumen, tympanic membranes and middle ear spaces normal  Neurological Examination: Facial nerve function intact and symmetric  Integumentary Examination: No lesions on the skin of the head and neck  Neck Examination: No masses or lesions, no lymphadenopathy  Endocrine Examination: Normal thyroid examination  Flexible Fiberoptic Laryngoscopy: Normal nasopharynx, base of tongue, valleculae, pyriform sinuses, false vocal cords and true vocal cords.  The vocal cords are moving normally and there are no lesions or masses in the larynx.  The posterior vocal cords and laryngeal wall are erythematous diffusely.    Assessment and Plan:    The patient presents with a history of hoarseness and sensorineural hearing loss. The patient will be referred for a consultation with Dr. Marquise Campo or Dr. Nora Winter for further evaluation and management of his larynx. He will complete the current use of otic drops in the right ear to removal all residual cerumen.     CC: Dr. Rhonda Kemp

## 2018-03-01 NOTE — MR AVS SNAPSHOT
After Visit Summary   3/1/2018    Sohan Rendon    MRN: [id number]           Patient Information     Date Of Birth          1950        Visit Information        Provider Department      3/1/2018 11:00 AM Louie Castillo MD; [employer] [facility] Ear Nose and Throat        Today's Diagnoses     Hoarseness    -  1    Sensorineural hearing loss (SNHL) of both ears          Care Instructions    The patient presents with a history of hoarseness and sensorineural hearing loss. The patient will be referred for a consultation with Dr. Marquise Campo or Dr. Nora Winter for further evaluation and management of his larynx. He will complete the current use of otic drops in the right ear to removal all residual cerumen.           Follow-ups after your visit        Your next 10 appointments already scheduled     Mar 09, 2018 10:30 AM CST   (Arrive by 10:15 AM)   Implanted Defibulator with Uc Cv Device 1   [facility] ([facility])    [address] [place] [address]  [address] [place] [id number] [phone]            Mar 09, 2018 11:00 AM CST   (Arrive by 10:45 AM)   Ventricular Assist Device with CLAIRE Escobar CNP   [facility] ([facility])    [address] [facility]  [address] [place] [id number] [phone]            Mar 16, 2018 12:00 PM CDT   Home Follow Up with Raghu Almodovar MD   [facility] ([facility])    [address] [place] [id number]   [phone]            Apr 20, 2018 10:00 AM CDT   (Arrive by 9:45 AM)   NEW THROAT with Nora Winter MD   [facility]ose [facility] ([facility])    [address] [place] [address]  4th Floor  [facility] [id number] [phone]           This is a multi-disciplinary care team visit as patients with your type of problem are usually seen by a team of an MD and a  Speech-Language Pathologist (who is a specialist in disorders of the voice, throat, and breathing).  Please plan about 2 hours for your visit, which will likely include Laryngeal Function Studies, a Voice/Swallow/Breathing Evaluation, and an Endoscopic Laryngeal Examination to provide a comprehensive evaluation.  Please check with your insurance company to ensure you are covered for these services. - It is important to know that if you are evaluated and/or treated by both a physician and a speech pathologist during your visit, your billing will reflect the input that you receive from both providers as separate professionals. Although most insurance plans do cover these services, we encourage you to contact your insurance company prior to your visit to determine whether there are any coverage limitations that might affect you financially. - Billing/procedure codes that are frequently associated with visits to our clinic include (but are not limited to) the ones listed below. Most patients will not need all of these items, but it may be useful to ask your insurance company's patient . 70826: Flexible fiberoptic laryngoscopy, 40966: Laryngoscopy; flexible or rigid fiberoptic, with stroboscopy, 58982: Flexible endoscopic evaluation of swallowing, 18938: Laryngeal function aerodynamic evaluation, 09264: Evaluation of Voice and Resonance, 52244: Speech pathology treatment for voice, speech, communication, 46808: Speech pathology treatment for swallowing/oral function for feeding - If you have any concerns or questions, or if you would prefer not to have a speech pathologist involved in your visit, please contact our Clinic Coordinator at [phone], at least 24 hours prior to your appointment.              Who to contact     Please call your clinic at [phone] to:    Ask questions about your health    Make or cancel appointments    Discuss your medicines    Learn about your test  results    Speak to your doctor            Additional Information About Your Visit        [employer] Information     [employer] is an electronic gateway that provides easy, online access to your medical records. With [employer], you can request a clinic appointment, read your test results, renew a prescription or communicate with your care team.     To sign up for [employer] visit the website at [email or web address]   You will be asked to enter the access code listed below, as well as some personal information. Please follow the directions to create your username and password.     Your access code is: [email or web address]  Expires: 3/12/2018  6:30 AM     Your access code will  in 90 days. If you need help or a new code, please contact your [facility] or call [phone] for assistance.        Care EveryWhere ID     This is your Care EveryWhere ID. This could be used by other organizations to access your [place] medical records  [id number]         Blood Pressure from Last 3 Encounters:   01/10/18 (!) 82/0   17 98/85   17 90/65    Weight from Last 3 Encounters:   17 84.2 kg (185 lb 10 oz)   17 81.6 kg (180 lb)   17 77.1 kg (170 lb)              We Performed the Following     LARYNGOSCOPY FLEX FIBEROPTIC, DIAGNOSTIC          Today's Medication Changes          These changes are accurate as of 3/1/18 12:18 PM.  If you have any questions, ask your nurse or doctor.               These medicines have changed or have updated prescriptions.        Dose/Directions    simethicone 80 MG chewable tablet   Commonly known as:  MYLICON   This may have changed:    - when to take this  - reasons to take this   Used for:  Slow transit constipation        Dose:  80 mg   Take 1 tablet (80 mg) by mouth 4 times daily   Quantity:  180 tablet   Refills:  5                Primary Care Provider Office Phone # Fax #    CLAIRE Rosas [phone]       [address] [place] [address]  [address] [address]        Equal Access to Services     ALCON SKINNER : Hadii aad ku hadpepekristel Brianaali, walinnda rabiagaelha, qaamolta scarlettishaavery ortezchantalavery, willie bandaryancasandra quiñonez. So [facility] [phone].    ATENCIÓN: Si habla español, tiene a smith disposición servicios gratuitos de asistencia lingüística. Llame al [phone].    We comply with applicable federal civil rights laws and [place] laws. We do not discriminate on the basis of race, color, national origin, age, disability, sex, sexual orientation, or gender identity.            Thank you!     Thank you for choosing [facility] EAR NOSE [facility] TH[facility]AT  for your care. Our goal is always to provide you with excellent care. Hearing back from our patients is one way we can continue to improve our services. Please take a few minutes to complete the written survey that you may receive in the mail after your visit with us. Thank you!             Your Updated Medication List - Protect others around you: Learn how to safely use, store and throw away your medicines at www.disposemymeds.org.          This list is accurate as of 3/1/18 12:18 PM.  Always use your most recent med list.                   Brand Name Dispense Instructions for use Diagnosis    acetaminophen 325 MG tablet    TYLENOL    100 tablet    Take 2 tablets (650 mg) by mouth every 6 hours as needed for mild pain        ALEK CONTOUR test strip   Generic drug:  blood glucose monitoring     100 strip    USE TO TEST BLOOD SUGAR 2 TIMES DAILY OR AS DIRECTED.    Hypoglycemia       bisacodyl 10 MG Suppository    DULCOLAX    5 suppository    Place 1 suppository (10 mg) rectally daily as needed for constipation    Drug-induced constipation       * blood glucose monitoring lancets     1 Box    Use to test blood sugars 2 times daily or as directed.    Diabetes mellitus, type 2 (H)       * blood glucose monitoring lancets     100 each    USE TO TEST BLOOD SUGAR 2 TIMES DAILY OR AS DIRECTED     Diabetes mellitus, type 2 (H)       calcium carbonate-vitamin D 500-400 MG-UNIT Tabs per tablet     90 tablet    Take 1 tablet by mouth daily    Cardiac arrhythmia, unspecified cardiac arrhythmia type       finasteride 5 MG tablet    PROSCAR    90 tablet    Take 1 tablet (5 mg) by mouth daily    Incomplete bladder emptying       furosemide 20 MG tablet    LASIX    20 tablet    Take 1 tablet (20 mg) by mouth as needed    Chronic systolic congestive heart failure (H)       gabapentin 100 MG capsule    NEURONTIN    60 capsule    Take 1 capsule (100 mg) by mouth 2 times daily as needed    Left foot pain       ketotifen 0.025 % Soln ophthalmic solution    ZADITOR/REFRESH ANTI-ITCH    1 Bottle    Place 1 drop into both eyes 2 times daily    Allergic conjunctivitis, bilateral       levETIRAcetam 750 MG tablet    KEPPRA    180 tablet    TAKE 1 TABLET (750 MG) BY MOUTH 2 TIMES DAILY    Convulsions, unspecified convulsion type (H)       loratadine 10 MG tablet    CLARITIN    90 tablet    TAKE 1 TABLET (10 MG) BY MOUTH DAILY    Allergic rhinitis       melatonin 3 MG tablet      Take 1 tablet (3 mg) by mouth At Bedtime    Insomnia, unspecified type       nitroGLYcerin 0.4 MG sublingual tablet    NITROSTAT    30 tablet    Place 1 tablet (0.4 mg) under the tongue every 5 minutes as needed for chest pain    Coronary artery disease involving native coronary artery with unstable angina pectoris (H)       nystatin 005945 UNIT/GM Powd    MYCOSTATIN    60 g    Apply to affected areas of skin in the groin and on the scrotum three times a day as needed.    Intertriginous dermatitis associated with moisture       * order for DME     1 Device    Equipment being ordered: Lift chair.  Please see indications and face-to-face encounter details in 2/3/15 Office Visit note.    Fracture of femoral neck, right, closed, initial encounter, Stroke (H), CHF (congestive heart failure), NYHA class IV (H)       * order for DME     2 each    Equipment  being ordered: Bilateral leg supports for manual wheelchair.    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  Please fax to [employer]; I called them 11/26/16 and they requested the new order.  Face to face is in my 10/26/16 note.    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  Dx: previous stroke with left hemiplegia.  Duration of need: 99 months.    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, subsequent encounter       PANTOPRAZOLE SODIUM PO      Take 20 mg by mouth every morning (before breakfast)        ranitidine 75 MG tablet    ZANTAC    30 tablet    Take 1 tablet (75 mg) by mouth At Bedtime    Gastroesophageal reflux disease without esophagitis       SENEXON-S 8.6-50 MG per tablet   Generic drug:  senna-docusate     60 tablet    TAKE 1-2 TABLETS BY MOUTH 2 TIMES DAILY AS NEEDED FOR CONSTIPATION    Slow transit constipation       simethicone 80 MG chewable tablet    MYLICON    180 tablet    Take 1 tablet (80 mg) by mouth 4 times daily    Slow transit constipation       tamsulosin 0.4 MG capsule    FLOMAX    60 capsule    Take 2 capsules (0.8 mg) by mouth daily    Incomplete bladder emptying       thiamine 100 MG tablet     90 tablet    TAKE 1 TABLET (100 MG) BY MOUTH DAILY    Cerebrovascular accident (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 Take 25mg by mouth every evening.        warfarin 3 MG tablet    COUMADIN     Take 4.5 mg by mouth daily        * Notice:  This list has 10 medication(s) that are the same as other medications prescribed for you. Read the directions carefully, and ask your doctor or other care provider to review them with you.

## 2018-03-01 NOTE — NURSING NOTE
Chief Complaint   Patient presents with     Consult     ear pain. No pain at this time.Impacted cerumen. Unable to obtain height and weight . Wheel chair bound.       Cristo Lara LPN

## 2018-03-05 NOTE — PROGRESS NOTES
ANTICOAGULATION FOLLOW-UP CLINIC VISIT    Patient Name:  Sohan Rendon  Date:  3/5/2018  Contact Type:  Telephone    SUBJECTIVE:        OBJECTIVE    INR   Date Value Ref Range Status   03/05/2018 2.7  Final     Chromogenic Factor 10   Date Value Ref Range Status   08/10/2014 24 (L) 70 - 130 % Final     Comment:     Therapeutic Range:  A Chromogenic Factor 10 level of approximately 20-40%   inversely correlates with an INR of 2-3 for patients receiving Warfarin.   Chromogenic Factor 10 levels below 20% indicate an INR greater than 3 and   levels above 40% indicate an INR less than 2.       Factor 2 Assay   Date Value Ref Range Status   07/21/2014 25 (L) 60 - 140 % Final     Comment:     Analyte Specific Reagents (ASRs) are used in many laboratory tests necessary   for   standard medical care and generally do not require FDA approval.  This test   was   developed and its performance characteristics determined by [facility].  It has not been cleared or approved by   the US Food and Drug Administration.         ASSESSMENT / PLAN  INR assessment SUPRA    Recheck INR In: 1 WEEK    INR Location [facility]      Anticoagulation Summary as of 3/5/2018     INR goal    Prior goal 1.8-2.5   Today's INR 2.7!   Maintenance plan 4.5 mg (3 mg x 1.5) on Sun, Tue, Thu; 3 mg (3 mg x 1) all other days   Full instructions 4.5 mg on Sun, Tue, Thu; 3 mg all other days   Weekly total 25.5 mg   Plan last modified Dafne Sanders RN (3/5/2018)   Next INR check 3/12/2018   Priority INR   Target end date Indefinite    Indications   LVAD (left ventricular assist device) present [Z95.811]  Long-term (current) use of anticoagulants [Z79.01] [Z79.01]         Anticoagulation Episode Summary     INR check location     Preferred lab     [facility]    Comments Goal Range is 1.8-2.3  [facility] comes out to draw INR  Sofya  [phone]  Daughter Almaz   [phone]      Anticoagulation Care Providers     Provider Role Specialty Phone number    Evon Lemons MD Responsible Cardiology [phone]            See the Encounter Report to view Anticoagulation Flowsheet and Dosing Calendar (Go to Encounters tab in chart review, and find the Anticoagulation Therapy Visit)    Spoke with [facility] [place] [facility] nurse    Dafne Sanders, RN             addendum: 3/8/18 Patient was in the ER on 3/7/18 for cough and SOB.  He was discharged on doxycycline. INR was 2.34.  No call made. [facility]    Addendum 3/8/18 Patients daughter reported that patient was also started on cefpodoxime.  Instructed daughter to give 3mg of warfarin today instead of 4.5mg.  Calender is updated. [facility]

## 2018-03-05 NOTE — MR AVS SNAPSHOT
Sohan Rendon   3/5/2018   Anticoagulation Therapy Visit    Description:  68 year old male   Provider:  Dafne Sanders RN   [facility]           INR as of 3/5/2018     Today's INR 2.7!      Anticoagulation Summary as of 3/5/2018     INR goal    Prior goal 1.8-2.5   Today's INR 2.7!   Full instructions 4.5 mg on Sun, Tue, Thu; 3 mg all other days   Next INR check 3/12/2018    Indications   LVAD (left ventricular assist device) present [Z95.811]  Long-term (current) use of anticoagulants [Z79.01] [Z79.01]         March 2018 Details    Sun Mon Tue Wed Thu Fri Sat         1               2               3                 4               5      3 mg   See details      6      4.5 mg         7      3 mg         8      4.5 mg         9      3 mg         10      3 mg           11      4.5 mg         12            13               14               15               16               17                 18               19               20               21               22               23               24                 25               26               27               28               29               30               31                Date Details   03/05 This INR check       Date of next INR:  3/12/2018         How to take your warfarin dose     To take:  3 mg Take 1 of the 3 mg tablets.    To take:  4.5 mg Take 1.5 of the 3 mg tablets.

## 2018-03-06 NOTE — TELEPHONE ENCOUNTER
[facility] TELEPHONE ENCOUNTER    Chief Complaint   Patient presents with     wheezing and cough       Sohan Rendon is a 68 year old  (1950),Daughter Almaz called today to report: her dad is coughing and has what sounds like wheezing or harsh bronchial sounds.  He is not running a fever however he is coughing up thick, tenacious, yellow mucous plugs that are getting darker.  Daughter said the Guaifenesin made him cough more.  I did let her know he needs to cough out the mucous and the medicine will help thin the mucous to help him cough it out better.    ASSESSMENT/PLAN  Acute bronchitis, unspecified organism  Will look at ordering a nebulizer machine to better manage his  Lungs.  - doxycycline (VIBRAMYCIN) 100 MG capsule; Take 1 capsule (100 mg) by mouth 2 times daily for 10 days        CLAIRE Poe CNP

## 2018-03-06 NOTE — PROGRESS NOTES
Almaz, the patient's daughter called to ask about giving her father an albuterol neb while he is getting Mucinex XR nebs. She was told that these two meds were OK to give concurrently. She was advised not to break the Mucinex tabs apart but to have him take them whole. If Sohan's cough does not improve, she will take him to his primary care doctor.

## 2018-03-07 NOTE — ED AVS SNAPSHOT
[facility], Emergency Department    [address] [address]    Phone:  [phone]                                       Sohan Rendon   MRN: [id number]    Department:  [facility], Emergency Department   Date of Visit:  3/7/2018           After Visit Summary Signature Page     I have received my discharge instructions, and my questions have been answered. I have discussed any challenges I see with this plan with the nurse or doctor.    ..........................................................................................................................................  Patient/Patient Representative Signature      ..........................................................................................................................................  Patient Representative Print Name and Relationship to Patient    ..................................................               ................................................  Date                                            Time    ..........................................................................................................................................  Reviewed by Signature/Title    ...................................................              ..............................................  Date                                                            Time

## 2018-03-07 NOTE — ED AVS SNAPSHOT
[facility], Emergency Department    [address] [address]    Phone:  [phone]                                       Sohan Rendon   MRN: [id number]    Department:  [facility], Emergency Department   Date of Visit:  3/7/2018           Patient Information     Date Of Birth          1950        Your diagnoses for this visit were:     Pneumonia due to infectious organism, unspecified laterality, unspecified part of lung        You were seen by Jose Carlos Gupta MD.        Discharge Instructions         Please make an appointment to follow up with Your Primary Care Provider in 2 days. Ask for them to check an exrta INR this week, because you are on the antibiotics which might change your level.     Treating Pneumonia  Pneumonia is an infection of one or both of the lungs. Pneumonia:    Is usually caused by either a virus or a bacteria    Can be very serious, especially in infants, young children, and older adults. It s also serious for those with other long-term health problems or weakened immune systems.    Is sometimes treated at home and sometimes in the hospital    Antibiotic medicines  Antibiotics may be prescribed for pneumonia caused by bacteria. They may be pills (oral medicines), or shots (injections). Or they may be given by IV (intravenously) into a vein. If you are taking oral medicines at home:    Fill your prescription and start taking your medicine as soon as you can.    You will likely start to feel better in a day or 2, but don t stop taking the antibiotic.    Use a pill organizer to help you remember to take your medicine.    Let your healthcare provider know if you have side effects.    Take your medicine exactly as directed on the label. Talk to your provider or pharmacist if you have any questions.  Antiviral medicines  Antiviral medicine may be prescribed for pneumonia caused by a virus. For example, antiviral medicine may be prescribed for pneumonia caused by the flu  virus. Antibiotics do not work against viruses. If you are taking antiviral medicine at home:    Fill your prescription and start taking your medicine as soon as you can.    Talk with your provider or pharmacist about possible side effects.    Take the medicine exactly as instructed.  To relieve symptoms  There are many medicines that can help relieve symptoms of pneumonia. Some are prescription and some are over-the-counter.  Your healthcare provider may recommend:    Acetaminophen or ibuprofen to lower your fever and to lessen headache or other pain    Cough medicine to loosen mucus or to reduce coughing  Make sure you check with your healthcare provider or pharmacist before taking any over-the-counter medicines.  Special treatments  If you are hospitalized for pneumonia, you may have other therapies, including:    Inhaled medicines to help with breathing or chest congestion    Supplemental oxygen to increase low oxygen levels  Drink fluids and eat healthy  You should eat healthy to help your body fight the infection. Drinking a lot of fluids helps to replace fluids lost from fever and to loosen mucus in your chest.    Diet. Make healthy food choices, including fruits and vegetables, lean meats and other proteins, 100% whole grain and low- or no-fat dairy products.    Fluids. Drink at least 6 to 8 tall glasses a day. Water and 100% fruit or vegetable juice are best.  Get plenty of rest and sleep  You may be more tired than usual for a while. It is important to get enough sleep at night. It s also important to rest during the day. Talk with your healthcare provider if coughing or other symptoms are interfering with your sleep.  Preventing the spread of germs  The best thing you can do to prevent spreading germs is to wash your hands often. You should:    Rub your hand with soap and water for 20 to 30 seconds.    Clean in between your fingers, the backs of your hands, and around your nails.    Dry your hands on a  separate towel or use paper towels.  You should also:    Keep alcohol-based hand  nearby.    Make sure you also clean surfaces that you touch. Use a product that kills all types of germs.    Stay away from others until you are feeling better.  When to call your healthcare provider  Call your healthcare provider if you have any of the following:    Symptoms get worse    Fever continues    Shortness of breath gets worse    Increased mucus or mucus that is darker in color    Coughing gets worse    Lips or fingers are bluish in color    Side effects from your medicine   Date Last Reviewed: 12/1/2016 2000-2017 The "[employer]". [address] [place] [address]. All rights reserved. This information is not intended as a substitute for professional medical care. Always follow your healthcare professional's instructions.          Future Appointments        Provider Department Dept Phone Center    3/9/2018 10:15 AM UC CV DEVICE 1              se; Jerald Potts [facility] [phone] [facility]    3/9/2018 10:45 AM Zuleika Patterson APRN CNP; Jerald Katlyn [facility] [phone] [facility]    3/16/2018 12:00 PM Raghu Almodovar MD [facility] [phone] [facility]    4/20/2018 10:00 AM Nora Winter MD [facility] [phone] [facility]      24 Hour Appointment Hotline       To make an appointment at any [facility] call [phone] ([phone]). If you don't have a family doctor or clinic, we will help you find one. [facility] are conveniently located to serve the needs of you and your family.             Review of your medicines      START taking        Dose / Directions Last dose taken    cefpodoxime 200 MG tablet   Commonly known as:  VANTIN   Dose:  200 mg   Quantity:  10 tablet        Take 1 tablet (200 mg) by mouth once for 1 dose   Refills:  0          CONTINUE these medicines which may have CHANGED, or have new  prescriptions. If we are uncertain of the size of tablets/capsules you have at home, strength may be listed as something that might have changed.        Dose / Directions Last dose taken    * doxycycline 100 MG capsule   Commonly known as:  VIBRAMYCIN   Dose:  100 mg   What changed:  Another medication with the same name was added. Make sure you understand how and when to take each.   Quantity:  20 capsule        Take 1 capsule (100 mg) by mouth 2 times daily for 10 days   Refills:  0        * doxycycline 100 MG capsule   Commonly known as:  VIBRAMYCIN   Dose:  100 mg   What changed:  You were already taking a medication with the same name, and this prescription was added. Make sure you understand how and when to take each.   Quantity:  10 capsule        Take 1 capsule (100 mg) by mouth 2 times daily for 5 days   Refills:  0        * Notice:  This list has 2 medication(s) that are the same as other medications prescribed for you. Read the directions carefully, and ask your doctor or other care provider to review them with you.      Our records show that you are taking the medicines listed below. If these are incorrect, please call your family doctor or clinic.        Dose / Directions Last dose taken    acetaminophen 325 MG tablet   Commonly known as:  TYLENOL   Dose:  650 mg   Quantity:  100 tablet        Take 2 tablets (650 mg) by mouth every 6 hours as needed for mild pain   Refills:  0        ALEK CONTOUR test strip   Quantity:  100 strip   Generic drug:  blood glucose monitoring        USE TO TEST BLOOD SUGAR 2 TIMES DAILY OR AS DIRECTED.   Refills:  2        bisacodyl 10 MG Suppository   Commonly known as:  DULCOLAX   Dose:  10 mg   Quantity:  5 suppository        Place 1 suppository (10 mg) rectally daily as needed for constipation   Refills:  1        * blood glucose monitoring lancets   Quantity:  1 Box        Use to test blood sugars 2 times daily or as directed.   Refills:  3        * blood glucose  monitoring lancets   Quantity:  100 each        USE TO TEST BLOOD SUGAR 2 TIMES DAILY OR AS DIRECTED   Refills:  2        calcium carbonate-vitamin D 500-400 MG-UNIT Tabs per tablet   Dose:  1 tablet   Quantity:  90 tablet        Take 1 tablet by mouth daily   Refills:  3        finasteride 5 MG tablet   Commonly known as:  PROSCAR   Dose:  5 mg   Quantity:  90 tablet        Take 1 tablet (5 mg) by mouth daily   Refills:  3        furosemide 20 MG tablet   Commonly known as:  LASIX   Dose:  20 mg   Quantity:  20 tablet        Take 1 tablet (20 mg) by mouth as needed   Refills:  11        gabapentin 100 MG capsule   Commonly known as:  NEURONTIN   Dose:  100 mg   Quantity:  60 capsule        Take 1 capsule (100 mg) by mouth 2 times daily as needed   Refills:  3        guaiFENesin 600 MG 12 hr tablet   Commonly known as:  MUCINEX   Dose:  600 mg   Quantity:  20 tablet        Take 1 tablet (600 mg) by mouth 2 times daily as needed for congestion   Refills:  0        ketotifen 0.025 % Soln ophthalmic solution   Commonly known as:  ZADITOR/REFRESH ANTI-ITCH   Dose:  1 drop   Quantity:  1 Bottle        Place 1 drop into both eyes 2 times daily   Refills:  11        levETIRAcetam 750 MG tablet   Commonly known as:  KEPPRA   Quantity:  180 tablet        TAKE 1 TABLET (750 MG) BY MOUTH 2 TIMES DAILY   Refills:  3        loratadine 10 MG tablet   Commonly known as:  CLARITIN   Quantity:  90 tablet        TAKE 1 TABLET (10 MG) BY MOUTH DAILY   Refills:  2        melatonin 3 MG tablet   Dose:  3 mg        Take 1 tablet (3 mg) by mouth At Bedtime   Refills:  0        nitroGLYcerin 0.4 MG sublingual tablet   Commonly known as:  NITROSTAT   Dose:  0.4 mg   Quantity:  30 tablet        Place 1 tablet (0.4 mg) under the tongue every 5 minutes as needed for chest pain   Refills:  1        nystatin 734743 UNIT/GM Powd   Commonly known as:  MYCOSTATIN   Quantity:  60 g        Apply to affected areas of skin in the groin and on the  scrotum three times a day as needed.   Refills:  3        * order for DME   Quantity:  1 Device        Equipment being ordered: Lift chair.  Please see indications and face-to-face encounter details in 2/3/15 Office Visit note.   Refills:  0        * order for DME   Quantity:  2 each        Equipment being ordered: Bilateral leg supports for manual wheelchair.   Refills:  0        * order for DME   Quantity:  1 each        Equipment being ordered: Reclining manual w/c with bilateral elevating leg rests.   Refills:  0        * order for DME   Quantity:  1 each        Equipment being ordered: Hospital Bed, fully electric.   Refills:  0        * order for DME   Quantity:  1 each        Equipment being ordered: Wheelchair, manual.   Refills:  0        * order for DME   Quantity:  1 each        Equipment being ordered: Wheelchair, manual, with elevated leg rests and tilt in space back.  Please fax to [facility]; I called them 11/26/16 and they requested the new order.  Face to face is in my 10/26/16 note.   Refills:  0        * order for DME   Quantity:  2 each        Equipment being ordered: Cushioned heel boots.   Refills:  0        * order for DME   Quantity:  2 each        Bilateral heel protective cushioning boots.  Dx: previous stroke with left hemiplegia.  Duration of need: 99 months.   Refills:  0        oxyCODONE IR 5 MG tablet   Commonly known as:  ROXICODONE   Dose:  5 mg   Quantity:  30 tablet        Take 1 tablet (5 mg) by mouth every 4 hours as needed for moderate to severe pain   Refills:  0        PANTOPRAZOLE SODIUM PO   Dose:  20 mg        Take 20 mg by mouth every morning (before breakfast)   Refills:  0        ranitidine 75 MG tablet   Commonly known as:  ZANTAC   Dose:  75 mg   Quantity:  30 tablet        Take 1 tablet (75 mg) by mouth At Bedtime   Refills:  3        SENEXON-S 8.6-50 MG per tablet   Quantity:  60 tablet   Generic drug:  senna-docusate        TAKE 1-2 TABLETS BY MOUTH 2 TIMES DAILY AS  NEEDED FOR CONSTIPATION   Refills:  3        simethicone 80 MG chewable tablet   Commonly known as:  MYLICON   Dose:  80 mg   Quantity:  180 tablet        Take 1 tablet (80 mg) by mouth 4 times daily   Refills:  5        tamsulosin 0.4 MG capsule   Commonly known as:  FLOMAX   Dose:  0.8 mg   Quantity:  60 capsule        Take 2 capsules (0.8 mg) by mouth daily   Refills:  11        thiamine 100 MG tablet   Quantity:  90 tablet        TAKE 1 TABLET (100 MG) BY MOUTH DAILY   Refills:  3        TOPROL XL PO        Take 50mg by mouth every morning. Take 25mg by mouth every evening.   Refills:  0        warfarin 3 MG tablet   Commonly known as:  COUMADIN   Dose:  4.5 mg        Take 4.5 mg by mouth daily   Refills:  0        * Notice:  This list has 10 medication(s) that are the same as other medications prescribed for you. Read the directions carefully, and ask your doctor or other care provider to review them with you.            Prescriptions were sent or printed at these locations (2 Prescriptions)                   Other Prescriptions                Printed at Department/Unit printer (2 of 2)         cefpodoxime (VANTIN) 200 MG tablet               doxycycline (VIBRAMYCIN) 100 MG capsule                Procedures and tests performed during your visit     CBC with platelets differential    Chest CT w/o contrast    Comprehensive metabolic panel    INR    Partial thromboplastin time    Peripheral IV catheter    Troponin I    XR Chest 2 Views      Orders Needing Specimen Collection     None      Pending Results     No orders found from 3/5/2018 to 3/8/2018.            Pending Culture Results     No orders found from 3/5/2018 to 3/8/2018.            Pending Results Instructions     If you had any lab results that were not finalized at the time of your Discharge, you can call the ED Lab Result RN at [phone]. You will be contacted by this team for any positive Lab results or changes in treatment. The nurses are  "available 7 days a week from 10A to 6:30P.  You can leave a message 24 hours per day and they will return your call.        Thank you for choosing [facility]       Thank you for choosing [facility] for your care. Our goal is always to provide you with excellent care. Hearing back from our patients is one way we can continue to improve our services. Please take a few minutes to complete the written survey that you may receive in the mail after you visit with us. Thank you!        [employer] Information     [employer] lets you send messages to your doctor, view your test results, renew your prescriptions, schedule appointments and more. To sign up, go to [email or web address] . Click on \"Log in\" on the left side of the screen, which will take you to the Welcome page. Then click on \"Sign up Now\" on the right side of the page.     You will be asked to enter the access code listed below, as well as some personal information. Please follow the directions to create your username and password.     Your access code is: [email or web address]  Expires: 3/12/2018  6:30 AM     Your access code will  in 90 days. If you need help or a new code, please call your [facility] clinic or [phone].        Care EveryWhere ID     This is your Care EveryWhere ID. This could be used by other organizations to access your [facility] medical records  [id number]        Equal Access to Services     ALCON SKINNER : Hadalee Rahman, waaxda tamiko, qaybta kaalwillie scott . So [facility] [phone].    ATENCIÓN: Si habla español, tiene a smith disposición servicios gratuitos de asistencia lingüística. Dayron al [phone].    We comply with applicable federal civil rights laws and [place] laws. We do not discriminate on the basis of race, color, national origin, age, disability, sex, sexual orientation, or gender identity.            After Visit Summary       This is your record. Keep this with you and " show to your community pharmacist(s) and doctor(s) at your next visit.

## 2018-03-07 NOTE — ED PROVIDER NOTES
"  History     Chief Complaint   Patient presents with     Cough     HPI  Sohan Rendon is a 68 year old male with a past medical history significant for severe ICM s/p HM II LVAD as DT c/b recurrent device thrombi, recurrent hemolysis and multiple embolic CVAs, recurrent GI/urogenital bleeding, CKD stage III, HTN, latent TB, and recurrent UTIs who presents for evaluation of a cough. Patient presents with his daughter who provides collateral history. Patient's daughter reports he has had a productive cough with yellow-white sputum for the past one week. She reports the cough was initially mild, but last week the patient reportedly had an ENT appointment during which he was scoped and following that appointment the patient's cough acutely worsened. The patient also complains of a sore, scratchy throat as well as allergies with \"itchy\" eyes, ears, and nose. He denies fever, chest pain, or shortness of breath. Patient denies hemoptysis. No issues or changes with his LVAD.     I have reviewed the Medications, Allergies, Past Medical and Surgical History, and Social History in the KeyView system.  Past Medical History:   Diagnosis Date     Acute right MCA stroke (H) 6/2013    With L sided hemiparesis      Anemia of chronic disease     Baseline Hb 8-9     BPH (benign prostatic hyperplasia)      CHF (congestive heart failure), NYHA class IV (H) 10/9/2012    s/p HeartMate II.  Was on milrinone and dobutamine prior to LVAD      CKD (chronic kidney disease)     Baseline Cr=     Clostridium difficile colitis 12/2012      Dysphagia     PEG tube placed in 2012.  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  Normal carotid duplex 3/2014.       Esophagitis 12/2012    EGD with esophagitis and multiple douenal ulcers     Fracture of femoral neck, right, closed (H) 2/3/2015    Presumed pathologic as patient is non-weight-bearing and suffered no " trauma.  Family declined operative repair during hospital stay 1/23 - 1/30/15.     Gastric and duodenal angiodysplasia with hemorrhage 6/18/2015     GERD (gastroesophageal reflux disease)      Gout      Hematuria      HTN (hypertension)     LDL=59 (3/29/2014)     Hyperlipaemia      Ischemic cardiomyopathy      Mitral regurgitation     s/p MVR with Carbomedics ring      Myocardial infarction 1998    In [facility] without intervention      Nonsenile cataract     BE     PFO (patent foramen ovale)     s/p closure (10/9/2012)     TB lung, latent     s/p 9 months INH in 2012        Past Surgical History:   Procedure Laterality Date     C CABG, VEIN, FIVE  2001     CATARACT IOL, RT/LT Right 11/19/2015     ENDOSCOPIC RETROGRADE CHOLANGIOPANCREATOGRAM N/A 5/9/2017    Procedure: COMBINED ENDOSCOPIC RETROGRADE CHOLANGIOPANCREATOGRAPHY, PLACE TUBE/STENT;  Endoscopic Retrograde Cholangiopancreatogram, Stent (Zimmon Biliary 7fr 12cm) Placement;  Surgeon: Cristo Grove MD;  Location: [facility] OR     ESOPHAGOSCOPY, GASTROSCOPY, DUODENOSCOPY (EGD), COMBINED N/A 6/10/2015    Procedure: COMBINED ESOPHAGOSCOPY, GASTROSCOPY, DUODENOSCOPY (EGD);  Surgeon: Edu Jenkins MD;  Location: [facility] GI     ESOPHAGOSCOPY, GASTROSCOPY, DUODENOSCOPY (EGD), COMBINED N/A 6/15/2015    Procedure: COMBINED ESOPHAGOSCOPY, GASTROSCOPY, DUODENOSCOPY (EGD);  Surgeon: Yury Bonner MD;  Location: [facility] OR     H INSERT ICD  2/10/2011    And pacemaker.  Not BiV     INSERT VENTRICULAR ASSIST DEVICE LEFT (HEARTMATE II)  10/9/2012     IR GASTRO JEJUNOSTOMY TUBE PLACEMENT       PHACOEMULSIFICATION CLEAR CORNEA WITH STANDARD INTRAOCULAR LENS IMPLANT Right 11/19/2015    Procedure: PHACOEMULSIFICATION CLEAR CORNEA WITH STANDARD INTRAOCULAR LENS IMPLANT;  Surgeon: Violeta Cosme MD;  Location: [facility] OR     REPAIR PATENT FORAMEN OVALE  10/9/2012     REPAIR VALVE MITRAL  2/9/2012    28 mm Carbomedics 2/3 ring        Family History   Problem Relation Age of Onset      Family History Negative No family hx of      Glaucoma No family hx of      Macular Degeneration No family hx of      CANCER No family hx of      no skin cancer       Social History   Substance Use Topics     Smoking status: Former Smoker     Smokeless tobacco: Former User     Alcohol use No       No current facility-administered medications for this encounter.      Current Outpatient Prescriptions   Medication     doxycycline (VIBRAMYCIN) 100 MG capsule     guaiFENesin (MUCINEX) 600 MG 12 hr tablet     tamsulosin (FLOMAX) 0.4 MG capsule     PANTOPRAZOLE SODIUM PO     blood glucose monitoring (ALEK MICROLET) lancets     acetaminophen (TYLENOL) 325 MG tablet     gabapentin (NEURONTIN) 100 MG capsule     ranitidine (ZANTAC) 75 MG tablet     warfarin (COUMADIN) 3 MG tablet     ALEK CONTOUR test strip     nystatin (MYCOSTATIN) 055415 UNIT/GM POWD     loratadine (CLARITIN) 10 MG tablet     order for DME     Metoprolol Succinate (TOPROL XL PO)     levETIRAcetam (KEPPRA) 750 MG tablet     furosemide (LASIX) 20 MG tablet     ketotifen (ZADITOR/REFRESH ANTI-ITCH) 0.025 % SOLN ophthalmic solution     SENEXON-S 8.6-50 MG per tablet     bisacodyl (DULCOLAX) 10 MG Suppository     finasteride (PROSCAR) 5 MG tablet     calcium carbonate-vitamin D 500-400 MG-UNIT TABS per tablet     simethicone (MYLICON) 80 MG chewable tablet     oxyCODONE (ROXICODONE) 5 MG IR tablet     melatonin 3 MG tablet     Thiamine HCl (VITAMIN B-1) 100 MG TABS     order for DME     order for DME     order for DME     order for DME     order for DME     order for DME     nitroglycerin (NITROSTAT) 0.4 MG SL tablet     blood glucose monitoring (NO BRAND SPECIFIED) lancets     ORDER FOR DME        Allergies   Allergen Reactions     Seasonal Allergies        Review of Systems   Constitutional: Negative for fever.   HENT: Positive for congestion and sore throat.    Respiratory: Positive for cough (productive w/yellow-white sputum). Negative for shortness of  breath.    Cardiovascular: Negative for chest pain.   All other systems reviewed and are negative.      Physical Exam   BP: 104/89  Pulse: 74  Temp: 97.7  F (36.5  C)  Resp: 16  SpO2: 100 %      Physical Exam   Constitutional: No distress.   HENT:   Head: Atraumatic.   Mouth/Throat: Oropharynx is clear and moist. No oropharyngeal exudate.   Eyes: Pupils are equal, round, and reactive to light. No scleral icterus.   Cardiovascular: Normal rate and regular rhythm.    Pulmonary/Chest: Breath sounds normal. No respiratory distress.   Abdominal: Soft. Bowel sounds are normal. There is no tenderness.   Musculoskeletal: He exhibits no edema or tenderness.   Skin: Skin is warm. No rash noted. He is not diaphoretic.   Nursing note and vitals reviewed.      ED Course     ED Course     Procedures       2:42 PM  The patient was seen and examined by Dr. Gupta in Room 10.          Critical Care time:  none             Labs Ordered and Resulted from Time of ED Arrival Up to the Time of Departure from the ED - No data to display         Assessments & Plan (with Medical Decision Making)     68 year old male with a past medical history significant for severe ICM s/p HM II LVAD as DT c/b recurrent device thrombi, recurrent hemolysis and multiple embolic CVAs, recurrent GI/urogenital bleeding, CKD stage III, HTN, latent TB, and recurrent UTIs who presents for evaluation of a cough. IV established, labs sent, reviewed and remarkable for Cr 1.86, INR 2.34, HgB 10.5. CXR obtained which was equivocal for infection. CT chest follow up study was also equivocal for infectious process. Patient given ceftriaxone and doxy empirically and plan for outpatient follow up with PCP and Rx for abx.      I have reviewed the nursing notes.    I have reviewed the findings, diagnosis, plan and need for follow up with the patient.    New Prescriptions    No medications on file       Final diagnoses:   Pneumonia due to infectious organism, unspecified  laterality, unspecified part of lung   I, Lisset Alexandre, am serving as a trained medical scribe to document services personally performed by Jose Carlos Gupta MD, based on the provider's statements to me.   I, Jose Carlos Gupta MD, was physically present and have reviewed and verified the accuracy of this note documented by Lisset Alexandre.      3/7/2018   [facility], [place], EMERGENCY DEPARTMENT     Jose Carlos Gupta MD  03/16/18 1329

## 2018-03-07 NOTE — ED NOTES
LVAD docked and patient's batteries are charging.  Settings: pump speed 8800 rpm, pulse index 5.3, pump power 5.2

## 2018-03-07 NOTE — ED NOTES
Pt arrives via ambulance with reports of yellow sputum x2 days. Denies fevers, reports recent chills. Current LVAD and daughter is concerned about one of the cords that may be loose.

## 2018-03-07 NOTE — PROGRESS NOTES
[facility] [facility]  Chief Complaint   Patient presents with     RECHECK       HPI:    Sohan Rendon is a 68 year old  (1950), who is being seen today for an episodic care visit at his home. This home visit is medically necessary as an office visit would require ambulance transportor , require excessive physical effort and cause pain .  HPI information obtained from: [facility] chart review and family/first contact daughter report. Acute and/or chronic conditions being managed today:  1. Dysphagia, unspecified type - ENT and ER visits since last seen- ENT suspects dysphagia due to CVAs wants eval, in process   2. Pneumonia of both lungs due to infectious organism, unspecified part of lung - Seen in ER couple of weeks ago- tx resp negs and abx- much improved now   3. CKD (chronic kidney disease) stage 4, GFR 15-29 ml/min (H) - stable Cr 1.87 last week   4. Chronic systolic congestive heart failure, NYHA class 4 (H) - Losartin added last week- Cardiology requests BMP next week - No sig SOB or CP at rest- gets no activity- bed to chair transfers only   5. LVAD (left ventricular assist device) present - continues to work, monitored by cardiology   6. Bilateral impacted cerumen - cleared now, no symptoms   7. Advanced directives, counseling/discussion -   Reviewed ACP with Daughter, Almaz who is family's/patients preferred proxy. No legal AD. Patient minimal capacity to participate due to prev CVAs. Can answer yes or no questions. Confirmed full code and tx of current conditions. Specific discussion of dysphagia and aspiration risk. Do want assessment and willing to modify diet but clear that they do not want PEG tube inserted for feeding.                         Allergies   Allergen Reactions     Seasonal Allergies        Past Medical, Surgical, Family and Social History reviewed in [facility] today.    Current Outpatient Prescriptions   Medication Sig Dispense Refill     losartan (COZAAR) 25 MG tablet Take 0.5  tablets (12.5 mg) by mouth daily 15 tablet 3     order for DME Equipment being ordered: Nebulizer 1 each 11     ipratropium - albuterol 0.5 mg/2.5 mg/3 mL (DUONEB) 0.5-2.5 (3) MG/3ML neb solution Take 1 vial (3 mLs) by nebulization every 6 hours as needed for shortness of breath / dyspnea, wheezing or other 360 mL 11     guaiFENesin (MUCINEX) 600 MG 12 hr tablet Take 1 tablet (600 mg) by mouth 2 times daily as needed for congestion 20 tablet 0     tamsulosin (FLOMAX) 0.4 MG capsule Take 2 capsules (0.8 mg) by mouth daily 60 capsule 11     PANTOPRAZOLE SODIUM PO Take 20 mg by mouth every morning (before breakfast)       blood glucose monitoring (Quri MICROLET) lancets USE TO TEST BLOOD SUGAR 2 TIMES DAILY OR AS DIRECTED 100 each 2     acetaminophen (TYLENOL) 325 MG tablet Take 2 tablets (650 mg) by mouth every 6 hours as needed for mild pain 100 tablet 0     gabapentin (NEURONTIN) 100 MG capsule Take 1 capsule (100 mg) by mouth 2 times daily as needed 60 capsule 3     ranitidine (ZANTAC) 75 MG tablet Take 1 tablet (75 mg) by mouth At Bedtime (Patient not taking: Reported on 3/9/2018) 30 tablet 3     warfarin (COUMADIN) 3 MG tablet Take 4.5 mg by mouth daily       ALEK CONTOUR test strip USE TO TEST BLOOD SUGAR 2 TIMES DAILY OR AS DIRECTED. 100 strip 2     nystatin (MYCOSTATIN) 050211 UNIT/GM POWD Apply to affected areas of skin in the groin and on the scrotum three times a day as needed. 60 g 3     loratadine (CLARITIN) 10 MG tablet TAKE 1 TABLET (10 MG) BY MOUTH DAILY 90 tablet 2     order for DME Bilateral heel protective cushioning boots.  Dx: previous stroke with left hemiplegia.  Duration of need: 99 months. 2 each 0     Metoprolol Succinate (TOPROL XL PO) Take 50mg by mouth every morning. Take 25mg by mouth every evening.       levETIRAcetam (KEPPRA) 750 MG tablet TAKE 1 TABLET (750 MG) BY MOUTH 2 TIMES DAILY 180 tablet 3     furosemide (LASIX) 20 MG tablet Take 1 tablet (20 mg) by mouth as needed 20 tablet  11     ketotifen (ZADITOR/REFRESH ANTI-ITCH) 0.025 % SOLN ophthalmic solution Place 1 drop into both eyes 2 times daily 1 Bottle 11     SENEXON-S 8.6-50 MG per tablet TAKE 1-2 TABLETS BY MOUTH 2 TIMES DAILY AS NEEDED FOR CONSTIPATION 60 tablet 3     bisacodyl (DULCOLAX) 10 MG Suppository Place 1 suppository (10 mg) rectally daily as needed for constipation 5 suppository 1     finasteride (PROSCAR) 5 MG tablet Take 1 tablet (5 mg) by mouth daily 90 tablet 3     calcium carbonate-vitamin D 500-400 MG-UNIT TABS per tablet Take 1 tablet by mouth daily 90 tablet 3     simethicone (MYLICON) 80 MG chewable tablet Take 1 tablet (80 mg) by mouth 4 times daily (Patient taking differently: Take 80 mg by mouth every 6 hours as needed ) 180 tablet 5     oxyCODONE (ROXICODONE) 5 MG IR tablet Take 1 tablet (5 mg) by mouth every 4 hours as needed for moderate to severe pain 30 tablet 0     melatonin 3 MG tablet Take 1 tablet (3 mg) by mouth At Bedtime       Thiamine HCl (VITAMIN B-1) 100 MG TABS TAKE 1 TABLET (100 MG) BY MOUTH DAILY 90 tablet 3     order for DME Equipment being ordered: Cushioned heel boots. 2 each 0     order for DME Equipment being ordered: Wheelchair, manual, with elevated leg rests and tilt in space back.  Please fax to [facility]; I called them 11/26/16 and they requested the new order.  Face to face is in my 10/26/16 note. 1 each 0     order for DME Equipment being ordered: Wheelchair, manual. 1 each 0     order for DME Equipment being ordered: Hospital Bed, fully electric. 1 each 0     order for DME Equipment being ordered: Reclining manual w/c with bilateral elevating leg rests. 1 each 0     order for DME Equipment being ordered: Bilateral leg supports for manual wheelchair. 2 each 0     nitroglycerin (NITROSTAT) 0.4 MG SL tablet Place 1 tablet (0.4 mg) under the tongue every 5 minutes as needed for chest pain 30 tablet 1     blood glucose monitoring (NO BRAND SPECIFIED) lancets Use to test blood sugars 2  times daily or as directed. 1 Box 3     ORDER FOR DME Equipment being ordered: Lift chair.    Please see indications and face-to-face encounter details in 2/3/15 Office Visit note. 1 Device 0       REVIEW OF SYSTEMS:  Unobtainable secondary to cognitive impairment. Daughter provided hx    There were no vitals taken for this visit.  GENERAL APPEARANCE:  somnolent, uncooperative  ENT:  Mouth and posterior oropharynx normal, moist mucous membranes, ear canal:normal TM noted after wax removal  RESP:  respiratory effort and palpation of chest normal, lungs clear to auscultation , no respiratory distress  CV:  Palpation and auscultation of heart done , no edema, mechanical hum of LVAD  SKIN:  Inspection of skin and subcutaneous tissue baseline  PSYCH:  insight and judgement impaired, memory impaired     Recent Labs:   CBC RESULTS:   Recent Labs   Lab Test  03/09/18   1055  03/07/18   1555   WBC  6.3  6.8   RBC  4.12*  3.96*   HGB  11.2*  10.5*   HCT  36.5*  34.5*   MCV  89  87   MCH  27.2  26.5   MCHC  30.7*  30.4*   RDW  18.2*  18.6*   PLT  232  212       Last Basic Metabolic Panel:  Recent Labs   Lab Test  03/09/18   1055  03/07/18   1555   NA  138  141   POTASSIUM  3.6  3.8   CHLORIDE  108  111*   JOSÉ  8.5  8.0*   CO2  23  22   BUN  20  20   CR  1.87*  1.86*   GLC  169*  93       Liver Function Studies -   Recent Labs   Lab Test  03/09/18   1055  03/07/18   1555   PROTTOTAL  7.6  7.9   ALBUMIN  3.2*  3.3*   BILITOTAL  0.5  0.6   ALKPHOS  115  116   AST  30  30   ALT  13  14       TSH   Date Value Ref Range Status   08/10/2015 1.32 0.40 - 4.00 mU/L Final   01/28/2015 0.89 0.40 - 4.00 mU/L Final     Comment:     Effective 7/30/2014, the reference range for this assay has changed to reflect   new instrumentation/methodology.     ]    Lab Results   Component Value Date    A1C 4.8 08/04/2017    A1C 5.2 07/21/2016         Assessment/Plan:    1. Dysphagia, unspecified type - ENT and ER visits since last seen- ENT suspects  dysphagia due to CVAs wants eval, in process, see ACP, no feeding tube however   2. Pneumonia of both lungs due to infectious organism, unspecified part of lung - Seen in ER couple of weeks ago- tx resp negs and abx- much improved now, cont monitor, nebs prn   3. CKD (chronic kidney disease) stage 4, GFR 15-29 ml/min (H) - stable Cr 1.87 last week   4. Chronic systolic congestive heart failure, NYHA class 4 (H) - Losartin added last week- Cardiology requests BMP next week - No sig SOB or CP at rest- gets no activity- bed to chair transfers only, will follow labs closely   5. LVAD (left ventricular assist device) present - continues to work, monitored by cardiology   6. Bilateral impacted cerumen - cleared now, no symptoms   7. Advanced directives, counseling/discussion -   Reviewed ACP with DaughterAlmaz who is family's/patients preferred proxy. No legal AD. Patient minimal capacity to participate due to prev CVAs. Can answer yes or no questions. Confirmed full code and tx of current conditions. Specific discussion of dysphagia and aspiration risk. Do want assessment and willing to modify diet but clear that they do not want PEG tube inserted for feeding.                            Next visit will be scheduled for May 11.        Electronically signed by:  Raghu Almodovar MD

## 2018-03-08 NOTE — PROGRESS NOTES
"  [facility] DME Order    Date: March 8, 2018  Patient: Sohan Rendon, 1950  [address]   [facility] [address]  Coverage information:     Subscriber: [id number] SOHAN RENDON     Rel to sub: 01 - Self     Member ID: [id number]     Payor: [address] [place] [address] Ph: [phone]     Benefit plan: [id number] Ph: [phone]     Group number: [id number]     Member effective dates: from 04/01/17          DME ordered:  Nebulizer:  for Ipratropium - Albuterol 0.5 mg/2.5 mg/3 mL medication every 6 hours as needed.  Mask, nebulizer cups, and tubing    Needing above DME for \"99\" Lifetime length of need for the following medical necessity:  (J98.11) Pulmonary atelectasis  (primary encounter diagnosis)  Comment:  He was seen in the emergency room 3/7/18 and will be seen by PCP 3/16/2018   Exam Information      Exam Date Exam Time Accession # Performing Department Results      3/7/18  7:11 PM [id number] [facility], [place], [place]        [employer] Interactive Report and InfoRx      View the interactive report       PACS Images      Show images for Chest CT w/o contrast       Study Result      EXAMINATION: Chest CT  without contrast      CLINICAL HISTORY: Concern for infection.     COMPARISON: Chest radiograph from the same day, CT from 5/6/2017 and  8/14/2014.     TECHNIQUE: CT imaging obtained through the chest without intravenous  contrast. Coronal and axial MIP reformatted images obtained.     FINDINGS:  There is significant motion artifact present as well as streak  artifact from the sternotomy wires, the automatic implantable cardiac  defibrillator, and the LVAD device, which limits the overall  evaluation.     There are linear bibasilar and lingular opacities that are most  consistent with atelectasis. There is no dense pulmonary  consolidation. There are a few pnuematocysts/subpleural cystic  changes, which are similar to comparison examinations. There are  scattered " pulmonary nodules, prominent examples include the followin mm nodule in the anterior right upper lobe (series 7 image 120),  stable since at least .  4 mm subpleural nodule in the lateral right upper lobe (series 7 image  119), stable since at least .  4 mm right middle lobe nodule (series 7 image 145), stable since at  least 2017, and probably also present on 2014 study.     There are postsurgical changes of median sternotomy. There is a left  chest pacemaker present with a single right ventricular lead. There is  an LVAD device which is only partially included in the field-of-view.  There are dense atherosclerotic calcifications in the coronary  arteries. Heart size is stable and mildly enlarged. There is no  pericardial effusion. No abnormal fluid collection surrounding the  LVAD device nor the drive line. There is no definite mediastinal or  hilar lymphadenopathy. No axillary lymphadenopathy.     Partially imaged upper abdomen:  Cholecystectomy. There is a biliary stent in place. There is  pneumobilia in the antidependent right lobe of the liver. Moderate  hiatal hernia. The adrenal glands are within normal limits. There is  diffuse fatty infiltration of the pancreas. The kidneys are atrophic  with large simple cysts bilaterally. Probable pneumobilia.     Bones and soft tissues:  Limited by above artifact for detection of small nondisplaced  fractures, although none are suspected. No suspicious bone lesion.         IMPRESSION:   1. Linear subpleural nondependent consolidative and groundglass  opacities are most consistent with scarring or atelectasis. Infection  is not entirely excluded, but considered less likely.  2. Scattered pulmonary nodules, most of which are stable since at  least , one which was not clearly identified in , but is  stable from 2017. Recommend attention on follow-up imaging.   3. Postprocedural changes of automatic implantable cardiac  defibrillator  implantation, LVAD device placement, and biliary  stenting.  4. There is significant motion artifact as well as streak artifact  from the metallic devices, which mildly limits the overall evaluation.     I have personally reviewed the examination and initial interpretation  and I agree with the findings.     HUNTER SEPULVEDA MD     MEDICAL NECESSITY STATEMENT:  Pulmonary atelectasis  - order for DME; Equipment being ordered: 1 Nebulizer. And 1 month supply = 2 Masks, 2 nebulizer cups, and tubing, the supplies are needed for lifetime and may be refilled for 1 year   - ipratropium - albuterol 0.5 mg/2.5 mg/3 mL (DUONEB) 0.5-2.5 (3) MG/3ML neb solution; Take 1 vial (3 mLs) by nebulization every 6 hours as needed for shortness of breath / dyspnea, wheezing or other respiratory distress.    This treatment is medically necessary to decrease hospitalizations and keep the patient comfortable in the home.  He is followed by home care and family members have demonstrated an interest and knowledge regarding the proper administration of the nebulizer treatments.    Plan: Start nebulizer treatments every 6 hours as needed with wheezing or other respiratory distress.      ELECTRONICALLY SIGNED BY [place] CERTIFIED PROVIDER:  CLAIRE Poe CNP   NPI: [id number]  [facility]  [address] [place] [address], [address]  [place] [address]  Cell: [phone]

## 2018-03-08 NOTE — TELEPHONE ENCOUNTER
[facility] TELEPHONE ENCOUNTER    Chief Complaint   Patient presents with     follow up ER visit       Sohan Rendon is a 68 year old  (1950) who went to the ER yesterday for worsening respiratory status.  He had CXR and Chest CT which revealed Atelectasis most likely but could not rule out infection.  Does have chronic nodules and ground glass opacities.  Almaz reported her dad is doing better and taking new antibiotic    ASSESSMENT/PLAN  Atelectasis in the setting of past smoker     I informed the daughter Almaz I have ordered a nebulizer which will take some time to get from the [facility].    I also sent an order for DuoNeb to their pharmacy, but not to pick it up until the Neb machine and supplies are delivered to their home.    CLAIRE Poe CNP

## 2018-03-08 NOTE — PROGRESS NOTES
HPI:   Mr. 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 He presents to clinic for routine follow up.     History obtained via  and his daughter. He complains of productive cough, which he was started on antibiotics per PCP yesterday. He denies lightheadedness, dizziness, changes in speech, fever, chills, chest pain, SOB, HOOK, PND, cough, nausea, vomiting, diarrhea, melena, hematochezia, and LE edema. He denies any concerns at his LVAD drive line site. His is unable to weigh himself at home as he is bed bound. He continues to maintain a low sodium diet.     PAST MEDICAL HISTORY:  Past Medical History:   Diagnosis Date     Acute right MCA stroke (H) 6/2013    With L sided hemiparesis      Anemia of chronic disease     Baseline Hb 8-9     BPH (benign prostatic hyperplasia)      CHF (congestive heart failure), NYHA class IV (H) 10/9/2012    s/p HeartMate II.  Was on milrinone and dobutamine prior to LVAD      CKD (chronic kidney disease)     Baseline Cr=     Clostridium difficile colitis 12/2012      Dysphagia     PEG tube placed in 2012.  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  Normal carotid duplex 3/2014.       Esophagitis 12/2012    EGD with esophagitis and multiple douenal ulcers     Fracture of femoral neck, right, closed (H) 2/3/2015    Presumed pathologic as patient is non-weight-bearing and suffered no trauma.  Family  declined operative repair during hospital stay 1/23 - 1/30/15.     Gastric and duodenal angiodysplasia with hemorrhage 6/18/2015     GERD (gastroesophageal reflux disease)      Gout      Hematuria      HTN (hypertension)     LDL=59 (3/29/2014)     Hyperlipaemia      Ischemic cardiomyopathy      Mitral regurgitation     s/p MVR with Carbomedics ring      Myocardial infarction 1998    In [facility] without intervention      Nonsenile cataract     BE     PFO (patent foramen ovale)     s/p closure (10/9/2012)     TB lung, latent     s/p 9 months INH in 2012        FAMILY HISTORY:  Family History   Problem Relation Age of Onset     Family History Negative No family hx of      Glaucoma No family hx of      Macular Degeneration No family hx of      CANCER No family hx of      no skin cancer       SOCIAL HISTORY:  Social History     Social History     Marital status:      Spouse name: N/A     Number of children: N/A     Years of education: N/A     Social History Main Topics     Smoking status: Former Smoker     Smokeless tobacco: Former User     Alcohol use No     Drug use: No     Sexual activity: Not on file     Other Topics Concern     Not on file     Social History Narrative       CURRENT MEDICATIONS:  Outpatient Medications Prior to Visit   Medication Sig Dispense Refill     order for DME Equipment being ordered: Nebulizer 1 each 11     ipratropium - albuterol 0.5 mg/2.5 mg/3 mL (DUONEB) 0.5-2.5 (3) MG/3ML neb solution Take 1 vial (3 mLs) by nebulization every 6 hours as needed for shortness of breath / dyspnea, wheezing or other 360 mL 11     guaiFENesin (MUCINEX) 600 MG 12 hr tablet Take 1 tablet (600 mg) by mouth 2 times daily as needed for congestion 20 tablet 0     tamsulosin (FLOMAX) 0.4 MG capsule Take 2 capsules (0.8 mg) by mouth daily 60 capsule 11     PANTOPRAZOLE SODIUM PO Take 20 mg by mouth every morning (before breakfast)       blood glucose monitoring (ALEK MICROLET) lancets USE TO TEST BLOOD SUGAR 2  TIMES DAILY OR AS DIRECTED 100 each 2     acetaminophen (TYLENOL) 325 MG tablet Take 2 tablets (650 mg) by mouth every 6 hours as needed for mild pain 100 tablet 0     gabapentin (NEURONTIN) 100 MG capsule Take 1 capsule (100 mg) by mouth 2 times daily as needed 60 capsule 3     warfarin (COUMADIN) 3 MG tablet Take 4.5 mg by mouth daily       ALEK CONTOUR test strip USE TO TEST BLOOD SUGAR 2 TIMES DAILY OR AS DIRECTED. 100 strip 2     nystatin (MYCOSTATIN) 220033 UNIT/GM POWD Apply to affected areas of skin in the groin and on the scrotum three times a day as needed. 60 g 3     loratadine (CLARITIN) 10 MG tablet TAKE 1 TABLET (10 MG) BY MOUTH DAILY 90 tablet 2     order for DME Bilateral heel protective cushioning boots.  Dx: previous stroke with left hemiplegia.  Duration of need: 99 months. 2 each 0     Metoprolol Succinate (TOPROL XL PO) Take 50mg by mouth every morning. Take 25mg by mouth every evening.       levETIRAcetam (KEPPRA) 750 MG tablet TAKE 1 TABLET (750 MG) BY MOUTH 2 TIMES DAILY 180 tablet 3     furosemide (LASIX) 20 MG tablet Take 1 tablet (20 mg) by mouth as needed 20 tablet 11     ketotifen (ZADITOR/REFRESH ANTI-ITCH) 0.025 % SOLN ophthalmic solution Place 1 drop into both eyes 2 times daily 1 Bottle 11     SENEXON-S 8.6-50 MG per tablet TAKE 1-2 TABLETS BY MOUTH 2 TIMES DAILY AS NEEDED FOR CONSTIPATION 60 tablet 3     bisacodyl (DULCOLAX) 10 MG Suppository Place 1 suppository (10 mg) rectally daily as needed for constipation 5 suppository 1     finasteride (PROSCAR) 5 MG tablet Take 1 tablet (5 mg) by mouth daily 90 tablet 3     calcium carbonate-vitamin D 500-400 MG-UNIT TABS per tablet Take 1 tablet by mouth daily 90 tablet 3     simethicone (MYLICON) 80 MG chewable tablet Take 1 tablet (80 mg) by mouth 4 times daily (Patient taking differently: Take 80 mg by mouth every 6 hours as needed ) 180 tablet 5     oxyCODONE (ROXICODONE) 5 MG IR tablet Take 1 tablet (5 mg) by mouth every 4 hours as  needed for moderate to severe pain 30 tablet 0     melatonin 3 MG tablet Take 1 tablet (3 mg) by mouth At Bedtime       Thiamine HCl (VITAMIN B-1) 100 MG TABS TAKE 1 TABLET (100 MG) BY MOUTH DAILY 90 tablet 3     order for DME Equipment being ordered: Cushioned heel boots. 2 each 0     order for DME Equipment being ordered: Wheelchair, manual, with elevated leg rests and tilt in space back.  Please fax to [facility]; I called them 11/26/16 and they requested the new order.  Face to face is in my 10/26/16 note. 1 each 0     order for DME Equipment being ordered: Wheelchair, manual. 1 each 0     order for DME Equipment being ordered: Hospital Bed, fully electric. 1 each 0     order for DME Equipment being ordered: Reclining manual w/c with bilateral elevating leg rests. 1 each 0     order for DME Equipment being ordered: Bilateral leg supports for manual wheelchair. 2 each 0     nitroglycerin (NITROSTAT) 0.4 MG SL tablet Place 1 tablet (0.4 mg) under the tongue every 5 minutes as needed for chest pain 30 tablet 1     blood glucose monitoring (NO BRAND SPECIFIED) lancets Use to test blood sugars 2 times daily or as directed. 1 Box 3     ORDER FOR DME Equipment being ordered: Lift chair.    Please see indications and face-to-face encounter details in 2/3/15 Office Visit note. 1 Device 0     doxycycline (VIBRAMYCIN) 100 MG capsule Take 1 capsule (100 mg) by mouth 2 times daily for 5 days 10 capsule 0     doxycycline (VIBRAMYCIN) 100 MG capsule Take 1 capsule (100 mg) by mouth 2 times daily for 10 days 20 capsule 0     ranitidine (ZANTAC) 75 MG tablet Take 1 tablet (75 mg) by mouth At Bedtime (Patient not taking: Reported on 3/9/2018) 30 tablet 3     No facility-administered medications prior to visit.        ROS:   CONSTITUTIONAL: Denies fever, chills, fatigue, or weight fluctuations.   HEENT: Denies headache, vision changes, and changes in speech.   CV: Refer to HPI.   PULMONARY: Complains of worsening cough.   GI:Denies  "nausea, vomiting, diarrhea, and abdominal pain. Bowel movements are regular.   :Denies urinary alterations, dysuria, urinary frequency, hematuria, and abnormal drainage.   EXT:Denies lower extremity edema.   SKIN:Denies abnormal rashes or lesions.   MUSCULOSKELETAL:Denies upper or lower extremity weakness and pain.   NEUROLOGIC:Denies lightheadedness, dizziness, seizures, or upper or lower extremity paresthesia.     EXAM:  BP (!) 96/0 (BP Location: Left arm, Patient Position: Chair, Cuff Size: Adult Large)  Pulse 61  Ht 1.727 m (5' 8\")  SpO2 100%  GENERAL: Appears alert and oriented times three.   HEENT: Eye symmetrical and free of discharge bilaterally. Mucous membranes moist and without lesions.  NECK: Supple and without lymphadenopathy. JVD 10 cm.    CV: RRR, S1S2 present without murmur, rub, or gallop.   RESPIRATORY: Respirations regular, even, and unlabored. Lungs CTA throughout.   GI: Soft and mildly distended with normoactive bowel sounds present in all quadrants. No tenderness, rebound, guarding. No organomegaly.   EXTREMITIES:+1 bilateral LE edema. 2+ bilateral pedal pulses.   NEUROLOGIC: Alert and orientated x 3. CN II-XII grossly intact. No focal deficits.   MUSCULOSKELETAL: No joint swelling or tenderness.   SKIN: No jaundice. No rashes or lesions. LVAD drive line site covered.     Labs:  CBC RESULTS:  Lab Results   Component Value Date    WBC 6.3 03/09/2018    RBC 4.12 (L) 03/09/2018    HGB 11.2 (L) 03/09/2018    HCT 36.5 (L) 03/09/2018    MCV 89 03/09/2018    MCH 27.2 03/09/2018    MCHC 30.7 (L) 03/09/2018    RDW 18.2 (H) 03/09/2018     03/09/2018       CMP RESULTS:  Lab Results   Component Value Date     03/09/2018    POTASSIUM 3.6 03/09/2018    CHLORIDE 108 03/09/2018    CO2 23 03/09/2018    ANIONGAP 6 03/09/2018     (H) 03/09/2018    BUN 20 03/09/2018    CR 1.87 (H) 03/09/2018    GFRESTIMATED 36 (L) 03/09/2018    GFRESTBLACK 44 (L) 03/09/2018    JOSÉ 8.5 03/09/2018    " BILITOTAL 0.5 03/09/2018    ALBUMIN 3.2 (L) 03/09/2018    ALKPHOS 115 03/09/2018    ALT 13 03/09/2018    AST 30 03/09/2018        INR RESULTS:  Lab Results   Component Value Date    INR 2.0 03/12/2018       Lab Results   Component Value Date    MAG 2.1 11/26/2017     Lab Results   Component Value Date    NTBNPI 811 08/27/2017     Lab Results   Component Value Date    NTBNP 1687 (H) 11/11/2015       Assessment and Plan:   Mr. 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 He presents to clinic for routine follow up with hypervolemia in setting of acute CAP.     Chronic systolic congestive heart failure secondary to ICM s/p LVAD HM II as DT. Complicated by hemolysis, thrombus, hematuria, and multiple CVA's.   Stage D, NYHA Class III  ACEi/ARB Resume Losartan 12.5 mg po daily. Repeat BMP in 1 week.   BB Toprol XL 50 mg in AM and 25 mg at HS.   Aldosterone antagonist No, Contraindicated given recent CKD.   SCD prophylaxis ICD.   Fluid status: Hypervolemic. Take Lasix 20 mg today. Repeat BMP in 1 week.   NSAID use: No.   MAP: 96.   LDH: Defer recheck given chronic elevation.   Anticoagulation: INR 2.51.    Antiplatelet: ASA held due to hematuria.   He is not currently a candidate for pump exchange given multiple CVA's. Palliative care deferred per family request for cultural rationale.      HTN.   - Resume Losartan for elevated MAPS at 12.5 mg po daily.      CAD and Hyperlipidemia.   - Lipitor discontinued to minimize meds per family's request.  -  ASA held due to hematuria.   - Continue Toprol XL.      CKD, Stage III.   - Cr improved at 1.87 from in the recent 2's.   - Losartan restarted at 12.5 mg po daily.   - Repeat BMP in 1 week.     CAP. Chest CT 3/7/18 notes stable nodules with inability to rule out infection.   - Continue antibiotics per ED as ordered.   - Note latent TB history. Follow up with PC if no improvement on antibiotics.     Follow up with PCP 3/16/18. Follow up in [facility] in 1 month. Repeat BMP in 1 week.     Zuleika Patterson  3/8/2018          CC  LAKIA TELLES

## 2018-03-08 NOTE — DISCHARGE INSTRUCTIONS
Please make an appointment to follow up with Your Primary Care Provider in 2 days. Ask for them to check an exrta INR this week, because you are on the antibiotics which might change your level.     Treating Pneumonia  Pneumonia is an infection of one or both of the lungs. Pneumonia:    Is usually caused by either a virus or a bacteria    Can be very serious, especially in infants, young children, and older adults. It s also serious for those with other long-term health problems or weakened immune systems.    Is sometimes treated at home and sometimes in the hospital    Antibiotic medicines  Antibiotics may be prescribed for pneumonia caused by bacteria. They may be pills (oral medicines), or shots (injections). Or they may be given by IV (intravenously) into a vein. If you are taking oral medicines at home:    Fill your prescription and start taking your medicine as soon as you can.    You will likely start to feel better in a day or 2, but don t stop taking the antibiotic.    Use a pill organizer to help you remember to take your medicine.    Let your healthcare provider know if you have side effects.    Take your medicine exactly as directed on the label. Talk to your provider or pharmacist if you have any questions.  Antiviral medicines  Antiviral medicine may be prescribed for pneumonia caused by a virus. For example, antiviral medicine may be prescribed for pneumonia caused by the flu virus. Antibiotics do not work against viruses. If you are taking antiviral medicine at home:    Fill your prescription and start taking your medicine as soon as you can.    Talk with your provider or pharmacist about possible side effects.    Take the medicine exactly as instructed.  To relieve symptoms  There are many medicines that can help relieve symptoms of pneumonia. Some are prescription and some are over-the-counter.  Your healthcare provider may recommend:    Acetaminophen or ibuprofen to lower your fever and to  lessen headache or other pain    Cough medicine to loosen mucus or to reduce coughing  Make sure you check with your healthcare provider or pharmacist before taking any over-the-counter medicines.  Special treatments  If you are hospitalized for pneumonia, you may have other therapies, including:    Inhaled medicines to help with breathing or chest congestion    Supplemental oxygen to increase low oxygen levels  Drink fluids and eat healthy  You should eat healthy to help your body fight the infection. Drinking a lot of fluids helps to replace fluids lost from fever and to loosen mucus in your chest.    Diet. Make healthy food choices, including fruits and vegetables, lean meats and other proteins, 100% whole grain and low- or no-fat dairy products.    Fluids. Drink at least 6 to 8 tall glasses a day. Water and 100% fruit or vegetable juice are best.  Get plenty of rest and sleep  You may be more tired than usual for a while. It is important to get enough sleep at night. It s also important to rest during the day. Talk with your healthcare provider if coughing or other symptoms are interfering with your sleep.  Preventing the spread of germs  The best thing you can do to prevent spreading germs is to wash your hands often. You should:    Rub your hand with soap and water for 20 to 30 seconds.    Clean in between your fingers, the backs of your hands, and around your nails.    Dry your hands on a separate towel or use paper towels.  You should also:    Keep alcohol-based hand  nearby.    Make sure you also clean surfaces that you touch. Use a product that kills all types of germs.    Stay away from others until you are feeling better.  When to call your healthcare provider  Call your healthcare provider if you have any of the following:    Symptoms get worse    Fever continues    Shortness of breath gets worse    Increased mucus or mucus that is darker in color    Coughing gets worse    Lips or fingers are  bluish in color    Side effects from your medicine   Date Last Reviewed: 12/1/2016 2000-2017 [employer]. [address] [place] [address], [place] [address]. All rights reserved. This information is not intended as a substitute for professional medical care. Always follow your healthcare professional's instructions.

## 2018-03-08 NOTE — ED NOTES
RN attempted to explain discharge plan and family said they were not comfortable being discharged. MD at bedside

## 2018-03-09 NOTE — MR AVS SNAPSHOT
After Visit Summary   3/9/2018    Sohan Rendon    MRN: [id number]           Patient Information     Date Of Birth          1950        Visit Information        Provider Department      3/9/2018 10:45 AM Zuleika Patterson APRN CNP; [employer] [facility]        Today's Diagnoses     Chronic systolic congestive heart failure (H)           Follow-ups after your visit        Your next 10 appointments already scheduled     Mar 16, 2018 12:00 PM CDT   Home Follow Up with Raghu Almodovar MD   [facility] ([facility])    [address]  [facility] [address]   [phone]            Apr 20, 2018 10:00 AM CDT   (Arrive by 9:45 AM)   NEW THROAT with Nora Winter MD   [facility] ([facility])    [address]  [address] [facility] [id number] [phone]           This is a multi-disciplinary care team visit as patients with your type of problem are usually seen by a team of an MD and a Speech-Language Pathologist (who is a specialist in disorders of the voice, throat, and breathing).  Please plan about 2 hours for your visit, which will likely include Laryngeal Function Studies, a Voice/Swallow/Breathing Evaluation, and an Endoscopic Laryngeal Examination to provide a comprehensive evaluation.  Please check with your insurance company to ensure you are covered for these services. - It is important to know that if you are evaluated and/or treated by both a physician and a speech pathologist during your visit, your billing will reflect the input that you receive from both providers as separate professionals. Although most insurance plans do cover these services, we encourage you to contact your insurance company prior to your visit to determine whether there are any coverage limitations that might affect you financially. - Billing/procedure codes that are frequently  "associated with visits to our clinic include (but are not limited to) the ones listed below. Most patients will not need all of these items, but it may be useful to ask your insurance company's patient . 12896: Flexible fiberoptic laryngoscopy, 38783: Laryngoscopy; flexible or rigid fiberoptic, with stroboscopy, 02395: Flexible endoscopic evaluation of swallowing, 40268: Laryngeal function aerodynamic evaluation, 36769: Evaluation of Voice and Resonance, 82646: Speech pathology treatment for voice, speech, communication, 96999: Speech pathology treatment for swallowing/oral function for feeding - If you have any concerns or questions, or if you would prefer not to have a speech pathologist involved in your visit, please contact our Clinic Coordinator at [phone], at least 24 hours prior to your appointment.              Who to contact     If you have questions or need follow up information about today's clinic visit or your schedule please contact [facility] directly at [phone].  Normal or non-critical lab and imaging results will be communicated to you by [employer], letter or phone within 4 business days after the clinic has received the results. If you do not hear from us within 7 days, please contact the clinic through [employer] or phone. If you have a critical or abnormal lab result, we will notify you by phone as soon as possible.  Submit refill requests through [employer] or call your pharmacy and they will forward the refill request to us. Please allow 3 business days for your refill to be completed.          Additional Information About Your Visit        Fundation Information     [employer] lets you send messages to your doctor, view your test results, renew your prescriptions, schedule appointments and more. To sign up, go to [email or web address] . Click on \"Log in\" on the left side of the screen, which will take you to the Welcome page. Then click on \"Sign up Now\" on the " "right side of the page.     You will be asked to enter the access code listed below, as well as some personal information. Please follow the directions to create your username and password.     Your access code is: [email or web address]  Expires: 2018  3:27 PM     Your access code will  in 90 days. If you need help or a new code, please call your [place] clinic or [phone].        Care EveryWhere ID     This is your Care EveryWhere ID. This could be used by other organizations to access your [facility] medical records  [id number]        Your Vitals Were     Pulse Height Pulse Oximetry             61 1.727 m (5' 8\") 100%          Blood Pressure from Last 3 Encounters:   18 (!) 96/0   18 117/89   01/10/18 (!) 82/0    Weight from Last 3 Encounters:   17 84.2 kg (185 lb 10 oz)   17 81.6 kg (180 lb)   17 77.1 kg (170 lb)              We Performed the Following     CBC with platelets     Comprehensive metabolic panel     INR          Today's Medication Changes          These changes are accurate as of 3/9/18 11:59 PM.  If you have any questions, ask your nurse or doctor.               Start taking these medicines.        Dose/Directions    losartan 25 MG tablet   Commonly known as:  COZAAR   Used for:  Chronic systolic congestive heart failure (H)   Started by:  Zuleika Patterson APRN CNP        Dose:  12.5 mg   Take 0.5 tablets (12.5 mg) by mouth daily   Quantity:  15 tablet   Refills:  3         These medicines have changed or have updated prescriptions.        Dose/Directions    simethicone 80 MG chewable tablet   Commonly known as:  MYLICON   This may have changed:    - when to take this  - reasons to take this   Used for:  Slow transit constipation        Dose:  80 mg   Take 1 tablet (80 mg) by mouth 4 times daily   Quantity:  180 tablet   Refills:  5            Where to get your medicines      These medications were sent to [facility]/pharmacy #[id number] - [place], [place] -  [address], [place] [address]     Phone:  [phone]     losartan 25 MG tablet                Primary Care Provider Office Phone # Fax #    CLAIRE Rosas [phone] [address] [address]        Equal Access to Services     ALCON SKINNER : Hadii aad ku hadasho Soomaali, waaxda luqadaha, qaybta kaalmada adeegyada, waxay idiin hayaan adeeg kharash la'marcon . So [facility] [phone].    ATENCIÓN: Si habla español, tiene a smith disposición servicios gratuitos de asistencia lingüística. [facility] [phone].    We comply with applicable federal civil rights laws and [place] laws. We do not discriminate on the basis of race, color, national origin, age, disability, sex, sexual orientation, or gender identity.            Thank you!     Thank you for choosing [facility]  for your care. Our goal is always to provide you with excellent care. Hearing back from our patients is one way we can continue to improve our services. Please take a few minutes to complete the written survey that you may receive in the mail after your visit with us. Thank you!             Your Updated Medication List - Protect others around you: Learn how to safely use, store and throw away your medicines at www.disposemymeds.org.          This list is accurate as of 3/9/18 11:59 PM.  Always use your most recent med list.                   Brand Name Dispense Instructions for use Diagnosis    acetaminophen 325 MG tablet    TYLENOL    100 tablet    Take 2 tablets (650 mg) by mouth every 6 hours as needed for mild pain        ALEK CONTOUR test strip   Generic drug:  blood glucose monitoring     100 strip    USE TO TEST BLOOD SUGAR 2 TIMES DAILY OR AS DIRECTED.    Hypoglycemia       bisacodyl 10 MG Suppository    DULCOLAX    5 suppository    Place 1 suppository (10 mg) rectally daily as needed for constipation    Drug-induced constipation       * blood glucose monitoring lancets      1 Box    Use to test blood sugars 2 times daily or as directed.    Diabetes mellitus, type 2 (H)       * blood glucose monitoring lancets     100 each    USE TO TEST BLOOD SUGAR 2 TIMES DAILY OR AS DIRECTED    Diabetes mellitus, type 2 (H)       calcium carbonate-vitamin D 500-400 MG-UNIT Tabs per tablet     90 tablet    Take 1 tablet by mouth daily    Cardiac arrhythmia, unspecified cardiac arrhythmia type       * doxycycline 100 MG capsule    VIBRAMYCIN    20 capsule    Take 1 capsule (100 mg) by mouth 2 times daily for 10 days    Acute bronchitis, unspecified organism       * doxycycline 100 MG capsule    VIBRAMYCIN    10 capsule    Take 1 capsule (100 mg) by mouth 2 times daily for 5 days        finasteride 5 MG tablet    PROSCAR    90 tablet    Take 1 tablet (5 mg) by mouth daily    Incomplete bladder emptying       furosemide 20 MG tablet    LASIX    20 tablet    Take 1 tablet (20 mg) by mouth as needed    Chronic systolic congestive heart failure (H)       gabapentin 100 MG capsule    NEURONTIN    60 capsule    Take 1 capsule (100 mg) by mouth 2 times daily as needed    Left foot pain       guaiFENesin 600 MG 12 hr tablet    MUCINEX    20 tablet    Take 1 tablet (600 mg) by mouth 2 times daily as needed for congestion    Laryngitis       ipratropium - albuterol 0.5 mg/2.5 mg/3 mL 0.5-2.5 (3) MG/3ML neb solution    DUONEB    360 mL    Take 1 vial (3 mLs) by nebulization every 6 hours as needed for shortness of breath / dyspnea, wheezing or other    Pulmonary atelectasis       ketotifen 0.025 % Soln ophthalmic solution    ZADITOR/REFRESH ANTI-ITCH    1 Bottle    Place 1 drop into both eyes 2 times daily    Allergic conjunctivitis, bilateral       levETIRAcetam 750 MG tablet    KEPPRA    180 tablet    TAKE 1 TABLET (750 MG) BY MOUTH 2 TIMES DAILY    Convulsions, unspecified convulsion type (H)       loratadine 10 MG tablet    CLARITIN    90 tablet    TAKE 1 TABLET (10 MG) BY MOUTH DAILY    Allergic rhinitis        losartan 25 MG tablet    COZAAR    15 tablet    Take 0.5 tablets (12.5 mg) by mouth daily    Chronic systolic congestive heart failure (H)       melatonin 3 MG tablet      Take 1 tablet (3 mg) by mouth At Bedtime    Insomnia, unspecified type       nitroGLYcerin 0.4 MG sublingual tablet    NITROSTAT    30 tablet    Place 1 tablet (0.4 mg) under the tongue every 5 minutes as needed for chest pain    Coronary artery disease involving native coronary artery with unstable angina pectoris (H)       nystatin 090254 UNIT/GM Powd    MYCOSTATIN    60 g    Apply to affected areas of skin in the groin and on the scrotum three times a day as needed.    Intertriginous dermatitis associated with moisture       * order for DME     1 Device    Equipment being ordered: Lift chair.  Please see indications and face-to-face encounter details in 2/3/15 Office Visit note.    Fracture of femoral neck, right, closed, initial encounter, Stroke (H), CHF (congestive heart failure), NYHA class IV (H)       * order for DME     2 each    Equipment being ordered: Bilateral leg supports for manual wheelchair.    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  Please fax to [facility]; I called them 11/26/16 and they requested the new order.  Face to face is in my 10/26/16 note.    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  Dx: previous stroke with left hemiplegia.  Duration of need: 99 months.    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, subsequent encounter       PANTOPRAZOLE SODIUM PO      Take 20 mg by mouth every morning (before breakfast)        ranitidine 75 MG tablet    ZANTAC    30 tablet    Take 1 tablet (75 mg) by mouth At Bedtime    Gastroesophageal reflux disease without esophagitis       SENEXON-S 8.6-50 MG per tablet   Generic drug:  senna-docusate     60 tablet    TAKE 1-2 TABLETS BY MOUTH 2 TIMES DAILY AS NEEDED FOR CONSTIPATION    Slow transit constipation       simethicone 80 MG chewable tablet    MYLICON    180 tablet    Take 1 tablet (80 mg) by mouth 4 times daily    Slow transit constipation       tamsulosin 0.4 MG capsule    FLOMAX    60 capsule    Take 2 capsules (0.8 mg) by mouth daily    Incomplete bladder emptying       thiamine 100 MG tablet     90 tablet     TAKE 1 TABLET (100 MG) BY MOUTH DAILY    Cerebrovascular accident (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 Take 25mg by mouth every evening.        warfarin 3 MG tablet    COUMADIN     Take 4.5 mg by mouth daily        * Notice:  This list has 13 medication(s) that are the same as other medications prescribed for you. Read the directions carefully, and ask your doctor or other care provider to review them with you.

## 2018-03-09 NOTE — LETTER
3/9/2018      RE: Sohan Rendon  [address]   [facility] [address]       Dear Colleague,    Thank you for the opportunity to participate in the care of your patient, Sohan Rendon, at the [facility]. Please see a copy of my visit note below.    HPI:   Mr. 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 He presents to clinic for routine follow up.     History obtained via  and his daughter. He complains of productive cough, which he was started on antibiotics per PCP yesterday. He denies lightheadedness, dizziness, changes in speech, fever, chills, chest pain, SOB, HOOK, PND, cough, nausea, vomiting, diarrhea, melena, hematochezia, and LE edema. He denies any concerns at his LVAD drive line site. His is unable to weigh himself at home as he is bed bound. He continues to maintain a low sodium diet.     PAST MEDICAL HISTORY:  Past Medical History:   Diagnosis Date     Acute right MCA stroke (H) 6/2013    With L sided hemiparesis      Anemia of chronic disease     Baseline Hb 8-9     BPH (benign prostatic hyperplasia)      CHF (congestive heart failure), NYHA class IV (H) 10/9/2012    s/p HeartMate II.  Was on milrinone and dobutamine prior to LVAD      CKD (chronic kidney disease)     Baseline Cr=     Clostridium difficile colitis 12/2012      Dysphagia     PEG tube placed in 2012.  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  Normal carotid duplex 3/2014.       Esophagitis 12/2012    EGD with esophagitis and multiple douenal ulcers     Fracture of femoral neck, right, closed (H) 2/3/2015    Presumed pathologic as patient is non-weight-bearing and suffered no trauma.  Family declined operative repair during hospital stay 1/23 - 1/30/15.     Gastric and duodenal angiodysplasia with hemorrhage 6/18/2015     GERD (gastroesophageal reflux disease)      Gout      Hematuria      HTN (hypertension)     LDL=59 (3/29/2014)     Hyperlipaemia      Ischemic cardiomyopathy      Mitral regurgitation     s/p MVR with Carbomedics ring      Myocardial infarction 1998    In [facility] without intervention      Nonsenile cataract     BE     PFO (patent foramen ovale)     s/p closure (10/9/2012)     TB lung, latent     s/p 9 months INH in 2012        FAMILY HISTORY:  Family History   Problem Relation Age of Onset     Family History Negative No family hx of      Glaucoma No family hx of      Macular Degeneration No family hx of      CANCER No family hx of      no skin cancer       SOCIAL HISTORY:  Social History     Social History     Marital status:      Spouse name: N/A     Number of children: N/A     Years of education: N/A     Social History Main Topics     Smoking status: Former Smoker     Smokeless tobacco: Former User     Alcohol use No     Drug use: No     Sexual activity: Not on file     Other Topics Concern     Not on file     Social History Narrative       CURRENT MEDICATIONS:  Outpatient Medications Prior to Visit   Medication Sig Dispense Refill     order for DME Equipment being ordered: Nebulizer 1 each 11     ipratropium - albuterol 0.5 mg/2.5 mg/3 mL (DUONEB) 0.5-2.5 (3) MG/3ML neb solution Take 1 vial (3 mLs) by nebulization every 6 hours as needed for shortness of breath / dyspnea, wheezing or other 360 mL 11     guaiFENesin (MUCINEX) 600 MG 12 hr tablet Take 1 tablet (600 mg)  by mouth 2 times daily as needed for congestion 20 tablet 0     tamsulosin (FLOMAX) 0.4 MG capsule Take 2 capsules (0.8 mg) by mouth daily 60 capsule 11     PANTOPRAZOLE SODIUM PO Take 20 mg by mouth every morning (before breakfast)       blood glucose monitoring (ALEK MICROLET) lancets USE TO TEST BLOOD SUGAR 2 TIMES DAILY OR AS DIRECTED 100 each 2     acetaminophen (TYLENOL) 325 MG tablet Take 2 tablets (650 mg) by mouth every 6 hours as needed for mild pain 100 tablet 0     gabapentin (NEURONTIN) 100 MG capsule Take 1 capsule (100 mg) by mouth 2 times daily as needed 60 capsule 3     warfarin (COUMADIN) 3 MG tablet Take 4.5 mg by mouth daily       ALEK CONTOUR test strip USE TO TEST BLOOD SUGAR 2 TIMES DAILY OR AS DIRECTED. 100 strip 2     nystatin (MYCOSTATIN) 338146 UNIT/GM POWD Apply to affected areas of skin in the groin and on the scrotum three times a day as needed. 60 g 3     loratadine (CLARITIN) 10 MG tablet TAKE 1 TABLET (10 MG) BY MOUTH DAILY 90 tablet 2     order for DME Bilateral heel protective cushioning boots.  Dx: previous stroke with left hemiplegia.  Duration of need: 99 months. 2 each 0     Metoprolol Succinate (TOPROL XL PO) Take 50mg by mouth every morning. Take 25mg by mouth every evening.       levETIRAcetam (KEPPRA) 750 MG tablet TAKE 1 TABLET (750 MG) BY MOUTH 2 TIMES DAILY 180 tablet 3     furosemide (LASIX) 20 MG tablet Take 1 tablet (20 mg) by mouth as needed 20 tablet 11     ketotifen (ZADITOR/REFRESH ANTI-ITCH) 0.025 % SOLN ophthalmic solution Place 1 drop into both eyes 2 times daily 1 Bottle 11     SENEXON-S 8.6-50 MG per tablet TAKE 1-2 TABLETS BY MOUTH 2 TIMES DAILY AS NEEDED FOR CONSTIPATION 60 tablet 3     bisacodyl (DULCOLAX) 10 MG Suppository Place 1 suppository (10 mg) rectally daily as needed for constipation 5 suppository 1     finasteride (PROSCAR) 5 MG tablet Take 1 tablet (5 mg) by mouth daily 90 tablet 3     calcium carbonate-vitamin D 500-400 MG-UNIT TABS per  tablet Take 1 tablet by mouth daily 90 tablet 3     simethicone (MYLICON) 80 MG chewable tablet Take 1 tablet (80 mg) by mouth 4 times daily (Patient taking differently: Take 80 mg by mouth every 6 hours as needed ) 180 tablet 5     oxyCODONE (ROXICODONE) 5 MG IR tablet Take 1 tablet (5 mg) by mouth every 4 hours as needed for moderate to severe pain 30 tablet 0     melatonin 3 MG tablet Take 1 tablet (3 mg) by mouth At Bedtime       Thiamine HCl (VITAMIN B-1) 100 MG TABS TAKE 1 TABLET (100 MG) BY MOUTH DAILY 90 tablet 3     order for DME Equipment being ordered: Cushioned heel boots. 2 each 0     order for DME Equipment being ordered: Wheelchair, manual, with elevated leg rests and tilt in space back.  Please fax to [facility]; I called them 11/26/16 and they requested the new order.  Face to face is in my 10/26/16 note. 1 each 0     order for DME Equipment being ordered: Wheelchair, manual. 1 each 0     order for DME Equipment being ordered: Hospital Bed, fully electric. 1 each 0     order for DME Equipment being ordered: Reclining manual w/c with bilateral elevating leg rests. 1 each 0     order for DME Equipment being ordered: Bilateral leg supports for manual wheelchair. 2 each 0     nitroglycerin (NITROSTAT) 0.4 MG SL tablet Place 1 tablet (0.4 mg) under the tongue every 5 minutes as needed for chest pain 30 tablet 1     blood glucose monitoring (NO BRAND SPECIFIED) lancets Use to test blood sugars 2 times daily or as directed. 1 Box 3     ORDER FOR DME Equipment being ordered: Lift chair.    Please see indications and face-to-face encounter details in 2/3/15 Office Visit note. 1 Device 0     doxycycline (VIBRAMYCIN) 100 MG capsule Take 1 capsule (100 mg) by mouth 2 times daily for 5 days 10 capsule 0     doxycycline (VIBRAMYCIN) 100 MG capsule Take 1 capsule (100 mg) by mouth 2 times daily for 10 days 20 capsule 0     ranitidine (ZANTAC) 75 MG tablet Take 1 tablet (75 mg) by mouth At Bedtime (Patient not taking:  "Reported on 3/9/2018) 30 tablet 3     No facility-administered medications prior to visit.        ROS:   CONSTITUTIONAL: Denies fever, chills, fatigue, or weight fluctuations.   HEENT: Denies headache, vision changes, and changes in speech.   CV: Refer to HPI.   PULMONARY: Complains of worsening cough.   GI:Denies nausea, vomiting, diarrhea, and abdominal pain. Bowel movements are regular.   :Denies urinary alterations, dysuria, urinary frequency, hematuria, and abnormal drainage.   EXT:Denies lower extremity edema.   SKIN:Denies abnormal rashes or lesions.   MUSCULOSKELETAL:Denies upper or lower extremity weakness and pain.   NEUROLOGIC:Denies lightheadedness, dizziness, seizures, or upper or lower extremity paresthesia.     EXAM:  BP (!) 96/0 (BP Location: Left arm, Patient Position: Chair, Cuff Size: Adult Large)  Pulse 61  Ht 1.727 m (5' 8\")  SpO2 100%  GENERAL: Appears alert and oriented times three.   HEENT: Eye symmetrical and free of discharge bilaterally. Mucous membranes moist and without lesions.  NECK: Supple and without lymphadenopathy. JVD 10 cm.    CV: RRR, S1S2 present without murmur, rub, or gallop.   RESPIRATORY: Respirations regular, even, and unlabored. Lungs CTA throughout.   GI: Soft and mildly distended with normoactive bowel sounds present in all quadrants. No tenderness, rebound, guarding. No organomegaly.   EXTREMITIES:+1 bilateral LE edema. 2+ bilateral pedal pulses.   NEUROLOGIC: Alert and orientated x 3. CN II-XII grossly intact. No focal deficits.   MUSCULOSKELETAL: No joint swelling or tenderness.   SKIN: No jaundice. No rashes or lesions. LVAD drive line site covered.     Labs:  CBC RESULTS:  Lab Results   Component Value Date    WBC 6.3 03/09/2018    RBC 4.12 (L) 03/09/2018    HGB 11.2 (L) 03/09/2018    HCT 36.5 (L) 03/09/2018    MCV 89 03/09/2018    MCH 27.2 03/09/2018    MCHC 30.7 (L) 03/09/2018    RDW 18.2 (H) 03/09/2018     03/09/2018       CMP RESULTS:  Lab Results "   Component Value Date     03/09/2018    POTASSIUM 3.6 03/09/2018    CHLORIDE 108 03/09/2018    CO2 23 03/09/2018    ANIONGAP 6 03/09/2018     (H) 03/09/2018    BUN 20 03/09/2018    CR 1.87 (H) 03/09/2018    GFRESTIMATED 36 (L) 03/09/2018    GFRESTBLACK 44 (L) 03/09/2018    JOSÉ 8.5 03/09/2018    BILITOTAL 0.5 03/09/2018    ALBUMIN 3.2 (L) 03/09/2018    ALKPHOS 115 03/09/2018    ALT 13 03/09/2018    AST 30 03/09/2018        INR RESULTS:  Lab Results   Component Value Date    INR 2.0 03/12/2018       Lab Results   Component Value Date    MAG 2.1 11/26/2017     Lab Results   Component Value Date    NTBNPI 811 08/27/2017     Lab Results   Component Value Date    NTBNP 1687 (H) 11/11/2015       Assessment and Plan:   Mr. 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 He presents to clinic for routine follow up with hypervolemia in setting of acute CAP.     Chronic systolic congestive heart failure secondary to ICM s/p LVAD HM II as DT. Complicated by hemolysis, thrombus, hematuria, and multiple CVA's.   Stage D, NYHA Class III  ACEi/ARB Resume Losartan 12.5 mg po daily. Repeat BMP in 1 week.   BB Toprol XL 50 mg in AM and 25 mg at HS.   Aldosterone antagonist No, Contraindicated given recent CKD.   SCD prophylaxis ICD.   Fluid status: Hypervolemic. Take Lasix 20 mg today. Repeat BMP in 1 week.   NSAID use: No.   MAP: 96.   LDH: Defer recheck given chronic elevation.   Anticoagulation: INR 2.51.    Antiplatelet: ASA held due to  hematuria.   He is not currently a candidate for pump exchange given multiple CVA's. Palliative care deferred per family request for cultural rationale.      HTN.   - Resume Losartan for elevated MAPS at 12.5 mg po daily.      CAD and Hyperlipidemia.   - Lipitor discontinued to minimize meds per family's request.  - ASA held due to hematuria.   - Continue Toprol XL.      CKD, Stage III.   - Cr improved at 1.87 from in the recent 2's.   - Losartan restarted at 12.5 mg po daily.   - Repeat BMP in 1 week.     CAP. Chest CT 3/7/18 notes stable nodules with inability to rule out infection.   - Continue antibiotics per ED as ordered.   - Note latent TB history. Follow up with PC if no improvement on antibiotics.     Follow up with PCP 3/16/18. Follow up in [facility] in 1 month. Repeat BMP in 1 week.     Zuleika Patterson  3/8/2018    CC  LAKIA TELLES                  [facility]   Device Interrogation Note  Date of Service: 3/9/2018    The patient's HeartMate LVAD was interrogated 3/9/2018  * Speed 8800 rpm   * Pulsatility index 4.1   * Power 5 Slaughter   * Flow --- L/minute   Fluid status: Hypervolemic   Alarms were reviewed, and notable for low flows and low PI's. This is baseline for patient.   The driveline exit site was covered with dressing clean, dry, and intact.   All external components were inspected and showed no evidence of damage or malfunction.    Zuleika Patterson  3/9/2018

## 2018-03-09 NOTE — PROGRESS NOTES
[facility]   Device Interrogation Note  Date of Service: 3/9/2018    The patient's HeartMate LVAD was interrogated 3/9/2018  * Speed 8800 rpm   * Pulsatility index 4.1   * Power 5 Slaughter   * Flow --- L/minute   Fluid status: Hypervolemic   Alarms were reviewed, and notable for low flows and low PI's. This is baseline for patient.   The driveline exit site was covered with dressing clean, dry, and intact.   All external components were inspected and showed no evidence of damage or malfunction.    Zuleika Patterson  3/9/2018

## 2018-03-09 NOTE — PROGRESS NOTES
ANTICOAGULATION FOLLOW-UP CLINIC VISIT    Patient Name:  Sohan Rendon  Date:  3/9/2018  Contact Type:  Telephone    SUBJECTIVE:     Patient Findings     Positives No Problem Findings           OBJECTIVE    INR   Date Value Ref Range Status   03/09/2018 2.51 (H) 0.86 - 1.14 Final     Chromogenic Factor 10   Date Value Ref Range Status   08/10/2014 24 (L) 70 - 130 % Final     Comment:     Therapeutic Range:  A Chromogenic Factor 10 level of approximately 20-40%   inversely correlates with an INR of 2-3 for patients receiving Warfarin.   Chromogenic Factor 10 levels below 20% indicate an INR greater than 3 and   levels above 40% indicate an INR less than 2.       Factor 2 Assay   Date Value Ref Range Status   07/21/2014 25 (L) 60 - 140 % Final     Comment:     Analyte Specific Reagents (ASRs) are used in many laboratory tests necessary   for   standard medical care and generally do not require FDA approval.  This test   was   developed and its performance characteristics determined by [facility].  It has not been cleared or approved by   the [place] Food and Drug Administration.         ASSESSMENT / PLAN  INR assessment THER    Recheck INR In: 3 DAYS    INR Location Clinic      Anticoagulation Summary as of 3/9/2018     INR goal    Prior goal 1.8-2.5   Today's INR 2.51!   Maintenance plan 4.5 mg (3 mg x 1.5) on Sun, Tue, Thu; 3 mg (3 mg x 1) all other days   Full instructions 4.5 mg on Sun, Tue, Thu; 3 mg all other days   Weekly total 25.5 mg   Plan last modified Blanca Bah RN (3/5/2018)   Next INR check 3/12/2018   Priority INR   Target end date Indefinite    Indications   LVAD (left ventricular assist device) present [Z95.811]  Long-term (current) use of anticoagulants [Z79.01] [Z79.01]         Anticoagulation Episode Summary     INR check location     Preferred lab     Send INR reminders to [facility]    Comments Goal Range is 1.8-2.3  [facility] comes  out to draw INR  Sofya Ph [phone]  Daughter Almaz Ph [phone]      Anticoagulation Care Providers     Provider Role Specialty Phone number    Evon Lemons MD Responsible Cardiology [phone]            See the Encounter Report to view Anticoagulation Flowsheet and Dosing Calendar (Go to Encounters tab in chart review, and find the Anticoagulation Therapy Visit)    Spoke with Almaz.    Dafne Sanders RN

## 2018-03-09 NOTE — MR AVS SNAPSHOT
Sohan Rendon   3/9/2018   Anticoagulation Therapy Visit    Description:  68 year old male   Provider:  Dafne Sanders RN   [facility]           INR as of 3/9/2018     Today's INR 2.51!      Anticoagulation Summary as of 3/9/2018     INR goal    Prior goal 1.8-2.5   Today's INR 2.51!   Full instructions 4.5 mg on Sun, Tue, Thu; 3 mg all other days   Next INR check 3/12/2018    Indications   LVAD (left ventricular assist device) present [Z95.811]  Long-term (current) use of anticoagulants [Z79.01] [Z79.01]         March 2018 Details    Sun Mon Tue Wed Thu Fri Sat         1               2               3                 4               5               6               7               8               9      3 mg   See details      10      3 mg           11      4.5 mg         12            13               14               15               16               17                 18               19               20               21               22               23               24                 25               26               27               28               29               30               31                Date Details   03/09 This INR check       Date of next INR:  3/12/2018         How to take your warfarin dose     To take:  3 mg Take 1 of the 3 mg tablets.    To take:  4.5 mg Take 1.5 of the 3 mg tablets.

## 2018-03-09 NOTE — NURSING NOTE
Chief Complaint   Patient presents with     Follow Up For     VAD FU 6 month return     Vitals were taken and medications were reconciled.  Dylan Murphy, RMA  10:50 AM

## 2018-03-09 NOTE — PROGRESS NOTES
Preliminary Device Interrogation Results.  Final physician signed paceart report to be scanned and attached.        Pt seen in clinic for evaluation and iterative programming of his Medtronic single chamber pacemaker per MD order.  His ICD check does not show any episodes of ventricular arrhythmias, he is RV Pacing 81.1% of the time, his heart rate histograms are flat and his optivol is at baseline today but does suggest that he may have been retaining some fluid over the past 2 mos.  His ICD battery is good and the ICD is functioning normally.  He has a routine follow up to see Zuleika Patterson NP. We will plan to see him back in clinic in 3 mos.    Single ICD

## 2018-03-09 NOTE — PATIENT INSTRUCTIONS
It was a pleasure to see you in clinic today.  Please do not hesitate to call us if you have any questions or concerns.  Please return to clinic in 3 mos for a ICD check.        Daysi Morales R.N.  Electrophysiology nurse specialist  [facility]  [address] [address]     Radha Wagner  [phone] business hours    After business hours please call [phone], option #4, and ask for Job code [id number].

## 2018-03-09 NOTE — MR AVS SNAPSHOT
After Visit Summary   3/9/2018    Sohan Rendon    MRN: [id number]           Patient Information     Date Of Birth          1950        Visit Information        Provider Department      3/9/2018 10:15 AM 1, Cara Cv Device; [employer] [facility]        Today's Diagnoses     Ischemic cardiomyopathy          Care Instructions    It was a pleasure to see you in clinic today.  Please do not hesitate to call us if you have any questions or concerns.  Please return to clinic in 3 mos for a ICD check.        Daysi Morales R.N.  Electrophysiology nurse specialist  [facility]  [address] [address]     Radhasal Wagner  [phone] business hours    After business hours please call [phone], option #4, and ask for Job code [id number].                  Follow-ups after your visit        Additional Services     Follow-Up with Device Clinic - 3 months                 Follow-up notes from your care team     Discussed this visit Return in about 3 months (around 6/9/2018) for ICD check.      Your next 10 appointments already scheduled     Mar 16, 2018 12:00 PM CDT   Home Follow Up with Raghu Almodovar MD   [facility] ([facility])    [address] [place] [id number]   [phone]            Apr 20, 2018 10:00 AM CDT   (Arrive by 9:45 AM)   NEW THROAT with Nora Winter MD   [facility] ([facility])    [address]  [address] [facility] [id number] [phone]           This is a multi-disciplinary care team visit as patients with your type of problem are usually seen by a team of an MD and a Speech-Language Pathologist (who is a specialist in disorders of the voice, throat, and breathing).  Please plan about 2 hours for your visit, which will likely include Laryngeal Function Studies, a Voice/Swallow/Breathing  Evaluation, and an Endoscopic Laryngeal Examination to provide a comprehensive evaluation.  Please check with your insurance company to ensure you are covered for these services. - It is important to know that if you are evaluated and/or treated by both a physician and a speech pathologist during your visit, your billing will reflect the input that you receive from both providers as separate professionals. Although most insurance plans do cover these services, we encourage you to contact your insurance company prior to your visit to determine whether there are any coverage limitations that might affect you financially. - Billing/procedure codes that are frequently associated with visits to our clinic include (but are not limited to) the ones listed below. Most patients will not need all of these items, but it may be useful to ask your insurance company's patient . 06154: Flexible fiberoptic laryngoscopy, 13566: Laryngoscopy; flexible or rigid fiberoptic, with stroboscopy, 16322: Flexible endoscopic evaluation of swallowing, 92005: Laryngeal function aerodynamic evaluation, 77852: Evaluation of Voice and Resonance, 15280: Speech pathology treatment for voice, speech, communication, 88410: Speech pathology treatment for swallowing/oral function for feeding - If you have any concerns or questions, or if you would prefer not to have a speech pathologist involved in your visit, please contact our Clinic Coordinator at [phone], at least 24 hours prior to your appointment.              Future tests that were ordered for you today     Open Future Orders        Priority Expected Expires Ordered    Follow-Up with Device Clinic - 3 months Routine 6/7/2018 9/5/2018 3/9/2018    Basic metabolic panel Routine 3/19/2018 3/9/2019 3/9/2018            Who to contact     If you have questions or need follow up information about today's clinic visit or your schedule please contact [facility]  "directly at [phone].  Normal or non-critical lab and imaging results will be communicated to you by MyChart, letter or phone within 4 business days after the clinic has received the results. If you do not hear from us within 7 days, please contact the clinic through [employer]lst[employer] or phone. If you have a critical or abnormal lab result, we will notify you by phone as soon as possible.  Submit refill requests through [employer] or call your pharmacy and they will forward the refill request to us. Please allow 3 business days for your refill to be completed.          Additional Information About Your Visit        ShotlstharHotchalk Information     [employer] lets you send messages to your doctor, view your test results, renew your prescriptions, schedule appointments and more. To sign up, go to [email or web address] . Click on \"Log in\" on the left side of the screen, which will take you to the Welcome page. Then click on \"Sign up Now\" on the right side of the page.     You will be asked to enter the access code listed below, as well as some personal information. Please follow the directions to create your username and password.     Your access code is: [email or web address]  Expires: 3/12/2018  6:30 AM     Your access code will  in 90 days. If you need help or a new code, please call your [facility] clinic or [phone].        Care EveryWhere ID     This is your Care EveryWhere ID. This could be used by other organizations to access your [facility] medical records  [id number]         Blood Pressure from Last 3 Encounters:   18 (!) 96/0   18 117/89   01/10/18 (!) 82/0    Weight from Last 3 Encounters:   17 84.2 kg (185 lb 10 oz)   17 81.6 kg (180 lb)   17 77.1 kg (170 lb)              We Performed the Following     (08793) ICD DEVICE PROGRAMMING EVAL, SINGLE LEAD ICD     Follow-Up with Device Clinic - 3 months          Today's Medication Changes          These changes are accurate as of 3/9/18 11:49 AM.  If " you have any questions, ask your nurse or doctor.               Start taking these medicines.        Dose/Directions    losartan 25 MG tablet   Commonly known as:  COZAAR   Used for:  Chronic systolic congestive heart failure (H)   Started by:  Zuleika Patterson APRN CNP        Dose:  12.5 mg   Take 0.5 tablets (12.5 mg) by mouth daily   Quantity:  15 tablet   Refills:  3         These medicines have changed or have updated prescriptions.        Dose/Directions    simethicone 80 MG chewable tablet   Commonly known as:  MYLICON   This may have changed:    - when to take this  - reasons to take this   Used for:  Slow transit constipation        Dose:  80 mg   Take 1 tablet (80 mg) by mouth 4 times daily   Quantity:  180 tablet   Refills:  5            Where to get your medicines      These medications were sent to [facility]/pharmacy #[id number] - [place], [place] - [address], [place] [address]     Phone:  [phone]     losartan 25 MG tablet                Primary Care Provider Office Phone # Fax #    CLAIRE Rosas [phone]       [address] [place] [address]        Equal Access to Services     [facility]: Hadii emily kaur hadpepeo Soedna, waaxda luqadaha, qaybta kaaldavidson rose, willie dorman . So [facility] [phone].    ATENCIÓN: Si habla español, tiene a smith disposición servicios gratuitos de asistencia lingüística. [facility] [phone].    We comply with applicable federal civil rights laws and [place] laws. We do not discriminate on the basis of race, color, national origin, age, disability, sex, sexual orientation, or gender identity.            Thank you!     Thank you for choosing [facility]  for your care. Our goal is always to provide you with excellent care. Hearing back from our patients is one way we can continue to improve our services. Please take a few minutes to complete the written survey that  you may receive in the mail after your visit with us. Thank you!             Your Updated Medication List - Protect others around you: Learn how to safely use, store and throw away your medicines at www.disposemymeds.org.          This list is accurate as of 3/9/18 11:49 AM.  Always use your most recent med list.                   Brand Name Dispense Instructions for use Diagnosis    acetaminophen 325 MG tablet    TYLENOL    100 tablet    Take 2 tablets (650 mg) by mouth every 6 hours as needed for mild pain        ALEK CONTOUR test strip   Generic drug:  blood glucose monitoring     100 strip    USE TO TEST BLOOD SUGAR 2 TIMES DAILY OR AS DIRECTED.    Hypoglycemia       bisacodyl 10 MG Suppository    DULCOLAX    5 suppository    Place 1 suppository (10 mg) rectally daily as needed for constipation    Drug-induced constipation       * blood glucose monitoring lancets     1 Box    Use to test blood sugars 2 times daily or as directed.    Diabetes mellitus, type 2 (H)       * blood glucose monitoring lancets     100 each    USE TO TEST BLOOD SUGAR 2 TIMES DAILY OR AS DIRECTED    Diabetes mellitus, type 2 (H)       calcium carbonate-vitamin D 500-400 MG-UNIT Tabs per tablet     90 tablet    Take 1 tablet by mouth daily    Cardiac arrhythmia, unspecified cardiac arrhythmia type       * doxycycline 100 MG capsule    VIBRAMYCIN    20 capsule    Take 1 capsule (100 mg) by mouth 2 times daily for 10 days    Acute bronchitis, unspecified organism       * doxycycline 100 MG capsule    VIBRAMYCIN    10 capsule    Take 1 capsule (100 mg) by mouth 2 times daily for 5 days        finasteride 5 MG tablet    PROSCAR    90 tablet    Take 1 tablet (5 mg) by mouth daily    Incomplete bladder emptying       furosemide 20 MG tablet    LASIX    20 tablet    Take 1 tablet (20 mg) by mouth as needed    Chronic systolic congestive heart failure (H)       gabapentin 100 MG capsule    NEURONTIN    60 capsule    Take 1 capsule (100 mg) by  mouth 2 times daily as needed    Left foot pain       guaiFENesin 600 MG 12 hr tablet    MUCINEX    20 tablet    Take 1 tablet (600 mg) by mouth 2 times daily as needed for congestion    Laryngitis       ipratropium - albuterol 0.5 mg/2.5 mg/3 mL 0.5-2.5 (3) MG/3ML neb solution    DUONEB    360 mL    Take 1 vial (3 mLs) by nebulization every 6 hours as needed for shortness of breath / dyspnea, wheezing or other    Pulmonary atelectasis       ketotifen 0.025 % Soln ophthalmic solution    ZADITOR/REFRESH ANTI-ITCH    1 Bottle    Place 1 drop into both eyes 2 times daily    Allergic conjunctivitis, bilateral       levETIRAcetam 750 MG tablet    KEPPRA    180 tablet    TAKE 1 TABLET (750 MG) BY MOUTH 2 TIMES DAILY    Convulsions, unspecified convulsion type (H)       loratadine 10 MG tablet    CLARITIN    90 tablet    TAKE 1 TABLET (10 MG) BY MOUTH DAILY    Allergic rhinitis       losartan 25 MG tablet    COZAAR    15 tablet    Take 0.5 tablets (12.5 mg) by mouth daily    Chronic systolic congestive heart failure (H)       melatonin 3 MG tablet      Take 1 tablet (3 mg) by mouth At Bedtime    Insomnia, unspecified type       nitroGLYcerin 0.4 MG sublingual tablet    NITROSTAT    30 tablet    Place 1 tablet (0.4 mg) under the tongue every 5 minutes as needed for chest pain    Coronary artery disease involving native coronary artery with unstable angina pectoris (H)       nystatin 376609 UNIT/GM Powd    MYCOSTATIN    60 g    Apply to affected areas of skin in the groin and on the scrotum three times a day as needed.    Intertriginous dermatitis associated with moisture       * order for DME     1 Device    Equipment being ordered: Lift chair.  Please see indications and face-to-face encounter details in 2/3/15 Office Visit note.    Fracture of femoral neck, right, closed, initial encounter, Stroke (H), CHF (congestive heart failure), NYHA class IV (H)       * order for DME     2 each    Equipment being ordered: Bilateral  leg supports for manual wheelchair.    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  Please fax to [employer]; I called them 11/26/16 and they requested the new order.  Face to face is in my 10/26/16 note.    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  Dx: previous stroke with left hemiplegia.  Duration of need: 99 months.    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, subsequent encounter       PANTOPRAZOLE SODIUM PO      Take 20 mg by mouth every morning (before breakfast)        ranitidine 75 MG tablet    ZANTAC    30 tablet    Take 1 tablet (75 mg) by mouth At Bedtime    Gastroesophageal reflux disease without esophagitis       SENEXON-S 8.6-50 MG per tablet   Generic drug:  senna-docusate     60 tablet    TAKE 1-2 TABLETS BY MOUTH 2 TIMES DAILY AS NEEDED FOR CONSTIPATION    Slow transit constipation       simethicone 80 MG chewable tablet    MYLICON    180 tablet    Take 1 tablet (80 mg) by mouth 4 times daily    Slow transit constipation       tamsulosin 0.4 MG capsule    FLOMAX    60 capsule    Take 2 capsules (0.8 mg) by mouth daily    Incomplete bladder emptying       thiamine 100 MG tablet     90 tablet    TAKE 1 TABLET (100 MG) BY MOUTH DAILY    Cerebrovascular accident (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 Take 25mg by mouth every evening.        warfarin 3 MG tablet    COUMADIN     Take 4.5 mg by mouth daily        * Notice:  This list has 13 medication(s) that are the same as other medications prescribed for you. Read the directions carefully, and ask your doctor or other care provider to review them with you.

## 2018-03-10 NOTE — TELEPHONE ENCOUNTER
Family is wondering when to give losartan. Patient just returned from hospital. He was given losartan last night.   Plan: give losartan tonight and every evening there after.  ERICA Chavez  Cell: [phone]

## 2018-03-12 NOTE — MR AVS SNAPSHOT
Sohan Rendon   3/12/2018   Anticoagulation Therapy Visit    Description:  68 year old male   Provider:  Dafne Sanders RN   [facility]           INR as of 3/12/2018     Today's INR 2.0      Anticoagulation Summary as of 3/12/2018     INR goal    Prior goal 1.8-2.5   Today's INR 2.0   Full instructions 4.5 mg on Sun, Tue, Thu; 3 mg all other days   Next INR check 3/19/2018    Indications   LVAD (left ventricular assist device) present [Z95.811]  Long-term (current) use of anticoagulants [Z79.01] [Z79.01]         March 2018 Details    Sun Mon Tue Wed Thu Fri Sat         1               2               3                 4               5               6               7               8               9               10                 11               12      3 mg   See details      13      4.5 mg         14      3 mg         15      4.5 mg         16      3 mg         17      3 mg           18      4.5 mg         19            20               21               22               23               24                 25               26               27               28               29               30               31                Date Details   03/12 This INR check       Date of next INR:  3/19/2018         How to take your warfarin dose     To take:  3 mg Take 1 of the 3 mg tablets.    To take:  4.5 mg Take 1.5 of the 3 mg tablets.

## 2018-03-12 NOTE — PROGRESS NOTES
ANTICOAGULATION FOLLOW-UP CLINIC VISIT    Patient Name:  Sohan Rendon  Date:  3/12/2018  Contact Type:  Telephone    SUBJECTIVE:     Patient Findings     Positives Other complaints (3/8-In ER for pneumonia.), Antibiotic use or infection (Doxycycline X5 days with last dose today. One other antibiotic is done today as well.)           OBJECTIVE    INR   Date Value Ref Range Status   03/12/2018 2.0  Final     Chromogenic Factor 10   Date Value Ref Range Status   08/10/2014 24 (L) 70 - 130 % Final     Comment:     Therapeutic Range:  A Chromogenic Factor 10 level of approximately 20-40%   inversely correlates with an INR of 2-3 for patients receiving Warfarin.   Chromogenic Factor 10 levels below 20% indicate an INR greater than 3 and   levels above 40% indicate an INR less than 2.       Factor 2 Assay   Date Value Ref Range Status   07/21/2014 25 (L) 60 - 140 % Final     Comment:     Analyte Specific Reagents (ASRs) are used in many laboratory tests necessary   for   standard medical care and generally do not require FDA approval.  This test   was   developed and its performance characteristics determined by [facility].  It has not been cleared or approved by   the US Food and Drug Administration.         ASSESSMENT / PLAN  INR assessment THER    Recheck INR In: 1 WEEK    INR Location [facility]      Anticoagulation Summary as of 3/12/2018     INR goal    Prior goal 1.8-2.5   Today's INR 2.0   Maintenance plan 4.5 mg (3 mg x 1.5) on Sun, Tue, Thu; 3 mg (3 mg x 1) all other days   Full instructions 4.5 mg on Sun, Tue, Thu; 3 mg all other days   Weekly total 25.5 mg   Plan last modified Blanca Bah RN (3/5/2018)   Next INR check 3/19/2018   Priority INR   Target end date Indefinite    Indications   LVAD (left ventricular assist device) present [Z95.811]  Long-term (current) use of anticoagulants [Z79.01] [Z79.01]         Anticoagulation Episode Summary     INR  check location     Preferred lab     Send INR reminders to [facility]    Comments Goal Range is 1.8-2.3  [facility] comes out to draw INR  Sofya Ph [phone]  Daughter Almaz Ph [phone]      Anticoagulation Care Providers     Provider Role Specialty Phone number    Evon Lemons MD Responsible Cardiology [phone]            See the Encounter Report to view Anticoagulation Flowsheet and Dosing Calendar (Go to Encounters tab in chart review, and find the Anticoagulation Therapy Visit)    Spoke with [facility] nurse    Dafne Sanders, RN

## 2018-03-16 NOTE — MR AVS SNAPSHOT
After Visit Summary   3/16/2018    Sohan Rendon    MRN: [id number]           Patient Information     Date Of Birth          1950        Visit Information        Provider Department      3/16/2018 12:00 PM Raghu Almodovar MD [facility]        Today's Diagnoses     Dysphagia, unspecified type    -  1    Pneumonia of both lungs due to infectious organism, unspecified part of lung        CKD (chronic kidney disease) stage 4, GFR 15-29 ml/min (H)        Chronic systolic congestive heart failure, NYHA class 4 (H)        LVAD (left ventricular assist device) present        Bilateral impacted cerumen        Advanced directives, counseling/discussion           Follow-ups after your visit        Your next 10 appointments already scheduled     Apr 20, 2018 10:00 AM CDT   (Arrive by 9:45 AM)   NEW THROAT with Nora Winter MD   [facility] ([facility])    [address] [place] [id number] [phone]           This is a multi-disciplinary care team visit as patients with your type of problem are usually seen by a team of an MD and a Speech-Language Pathologist (who is a specialist in disorders of the voice, throat, and breathing).  Please plan about 2 hours for your visit, which will likely include Laryngeal Function Studies, a Voice/Swallow/Breathing Evaluation, and an Endoscopic Laryngeal Examination to provide a comprehensive evaluation.  Please check with your insurance company to ensure you are covered for these services. - It is important to know that if you are evaluated and/or treated by both a physician and a speech pathologist during your visit, your billing will reflect the input that you receive from both providers as separate professionals. Although most insurance plans do cover these services, we encourage you to contact your insurance company prior to your visit to determine whether there are any  coverage limitations that might affect you financially. - Billing/procedure codes that are frequently associated with visits to our clinic include (but are not limited to) the ones listed below. Most patients will not need all of these items, but it may be useful to ask your insurance company's patient . 93806: Flexible fiberoptic laryngoscopy, 40110: Laryngoscopy; flexible or rigid fiberoptic, with stroboscopy, 49856: Flexible endoscopic evaluation of swallowing, 69348: Laryngeal function aerodynamic evaluation, 45504: Evaluation of Voice and Resonance, 15014: Speech pathology treatment for voice, speech, communication, 91274: Speech pathology treatment for swallowing/oral function for feeding - If you have any concerns or questions, or if you would prefer not to have a speech pathologist involved in your visit, please contact our Clinic Coordinator at [phone], at least 24 hours prior to your appointment.            May 11, 2018 11:00 AM CDT   Home Follow Up with Raghu Almodovar MD   [facility] ([facility])    [address] [address] [phone]              Who to contact     If you have questions or need follow up information about today's clinic visit or your schedule please contact [facility] directly at [phone].  Normal or non-critical lab and imaging results will be communicated to you by MyChart, letter or phone within 4 business days after the clinic has received the results. If you do not hear from us within 7 days, please contact the clinic through MyChart or phone. If you have a critical or abnormal lab result, we will notify you by phone as soon as possible.  Submit refill requests through [employer] or call your pharmacy and they will forward the refill request to us. Please allow 3 business days for your refill to be completed.          Additional Information About Your Visit        [employer]erge[employer] Information   "   [employer] lets you send messages to your doctor, view your test results, renew your prescriptions, schedule appointments and more. To sign up, go to [email or web address] . Click on \"Log in\" on the left side of the screen, which will take you to the Welcome page. Then click on \"Sign up Now\" on the right side of the page.     You will be asked to enter the access code listed below, as well as some personal information. Please follow the directions to create your username and password.     Your access code is: [email or web address]  Expires: 2018  3:27 PM     Your access code will  in 90 days. If you need help or a new code, please call your [facility] clinic or [phone].        Care EveryWhere ID     This is your Care EveryWhere ID. This could be used by other organizations to access your [facility] medical records  [id number]         Blood Pressure from Last 3 Encounters:   18 95/79   18 (!) 96/0   18 117/89    Weight from Last 3 Encounters:   18 182 lb 1.6 oz (82.6 kg)   17 185 lb 10 oz (84.2 kg)   17 180 lb (81.6 kg)                 Today's Medication Changes          These changes are accurate as of 3/16/18 11:59 PM.  If you have any questions, ask your nurse or doctor.               These medicines have changed or have updated prescriptions.        Dose/Directions    simethicone 80 MG chewable tablet   Commonly known as:  MYLICON   This may have changed:    - when to take this  - reasons to take this   Used for:  Slow transit constipation        Dose:  80 mg   Take 1 tablet (80 mg) by mouth 4 times daily   Quantity:  180 tablet   Refills:  5                Primary Care Provider Office Phone # Fax #    CLAIRE Rosas [phone] [address] [address]        Equal Access to Services     ALCON SKINNER AH: Rocky Rahman, jeana morrison, willie morse ah. So " [facility] [phone].    ATENCIÓN: Si mel villagomez, tiene a smith disposición servicios gratuitos de asistencia lingüística. Dayron smith [phone].    We comply with applicable federal civil rights laws and [place] laws. We do not discriminate on the basis of race, color, national origin, age, disability, sex, sexual orientation, or gender identity.            Thank you!     Thank you for choosing [facility]  for your care. Our goal is always to provide you with excellent care. Hearing back from our patients is one way we can continue to improve our services. Please take a few minutes to complete the written survey that you may receive in the mail after your visit with us. Thank you!             Your Updated Medication List - Protect others around you: Learn how to safely use, store and throw away your medicines at www.disposemymeds.org.          This list is accurate as of 3/16/18 11:59 PM.  Always use your most recent med list.                   Brand Name Dispense Instructions for use Diagnosis    acetaminophen 325 MG tablet    TYLENOL    100 tablet    Take 2 tablets (650 mg) by mouth every 6 hours as needed for mild pain        ALEK CONTOUR test strip   Generic drug:  blood glucose monitoring     100 strip    USE TO TEST BLOOD SUGAR 2 TIMES DAILY OR AS DIRECTED.    Hypoglycemia       bisacodyl 10 MG Suppository    DULCOLAX    5 suppository    Place 1 suppository (10 mg) rectally daily as needed for constipation    Drug-induced constipation       * blood glucose monitoring lancets     1 Box    Use to test blood sugars 2 times daily or as directed.    Diabetes mellitus, type 2 (H)       * blood glucose monitoring lancets     100 each    USE TO TEST BLOOD SUGAR 2 TIMES DAILY OR AS DIRECTED    Diabetes mellitus, type 2 (H)       calcium carbonate-vitamin D 500-400 MG-UNIT Tabs per tablet     90 tablet    Take 1 tablet by mouth daily    Cardiac arrhythmia, unspecified cardiac arrhythmia type       doxycycline  100 MG capsule    VIBRAMYCIN    20 capsule    Take 1 capsule (100 mg) by mouth 2 times daily for 10 days    Acute bronchitis, unspecified organism       finasteride 5 MG tablet    PROSCAR    90 tablet    Take 1 tablet (5 mg) by mouth daily    Incomplete bladder emptying       furosemide 20 MG tablet    LASIX    20 tablet    Take 1 tablet (20 mg) by mouth as needed    Chronic systolic congestive heart failure (H)       gabapentin 100 MG capsule    NEURONTIN    60 capsule    Take 1 capsule (100 mg) by mouth 2 times daily as needed    Left foot pain       guaiFENesin 600 MG 12 hr tablet    MUCINEX    20 tablet    Take 1 tablet (600 mg) by mouth 2 times daily as needed for congestion    Laryngitis       ipratropium - albuterol 0.5 mg/2.5 mg/3 mL 0.5-2.5 (3) MG/3ML neb solution    DUONEB    360 mL    Take 1 vial (3 mLs) by nebulization every 6 hours as needed for shortness of breath / dyspnea, wheezing or other    Pulmonary atelectasis       ketotifen 0.025 % Soln ophthalmic solution    ZADITOR/REFRESH ANTI-ITCH    1 Bottle    Place 1 drop into both eyes 2 times daily    Allergic conjunctivitis, bilateral       levETIRAcetam 750 MG tablet    KEPPRA    180 tablet    TAKE 1 TABLET (750 MG) BY MOUTH 2 TIMES DAILY    Convulsions, unspecified convulsion type (H)       loratadine 10 MG tablet    CLARITIN    90 tablet    TAKE 1 TABLET (10 MG) BY MOUTH DAILY    Allergic rhinitis       losartan 25 MG tablet    COZAAR    15 tablet    Take 0.5 tablets (12.5 mg) by mouth daily    Chronic systolic congestive heart failure (H)       melatonin 3 MG tablet      Take 1 tablet (3 mg) by mouth At Bedtime    Insomnia, unspecified type       nitroGLYcerin 0.4 MG sublingual tablet    NITROSTAT    30 tablet    Place 1 tablet (0.4 mg) under the tongue every 5 minutes as needed for chest pain    Coronary artery disease involving native coronary artery with unstable angina pectoris (H)       nystatin [id number] UNIT/GM Powd    MYCOSTATIN    60 g     Apply to affected areas of skin in the groin and on the scrotum three times a day as needed.    Intertriginous dermatitis associated with moisture       order for DME     1 Device    Equipment being ordered: Lift chair.  Please see indications and face-to-face encounter details in 2/3/15 Office Visit note.    Fracture of femoral neck, right, closed, initial encounter, Stroke (H), CHF (congestive heart failure), NYHA class IV (H)       * order for DME     2 each    Equipment being ordered: Bilateral leg supports for manual wheelchair.    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  Please fax to [employer]; I called them 11/26/16 and they requested the new order.  Face to face is in my 10/26/16 note.    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  Dx: previous stroke with left hemiplegia.  Duration of need: 99 months.    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, subsequent encounter       PANTOPRAZOLE SODIUM PO      Take 20 mg by mouth every morning (before breakfast)        ranitidine 75 MG tablet    ZANTAC    30 tablet    Take 1 tablet (75 mg) by mouth At Bedtime    Gastroesophageal reflux disease without esophagitis       SENEXON-S 8.6-50 MG per tablet   Generic drug:  senna-docusate     60 tablet    TAKE 1-2 TABLETS BY MOUTH 2 TIMES DAILY AS NEEDED FOR CONSTIPATION    Slow transit constipation       simethicone 80 MG chewable tablet    MYLICON    180 tablet    Take 1 tablet (80 mg) by mouth 4 times daily    Slow transit constipation       tamsulosin 0.4 MG capsule    FLOMAX    60 capsule    Take 2 capsules (0.8 mg) by mouth daily    Incomplete bladder emptying       thiamine 100 MG tablet     90 tablet    TAKE 1 TABLET (100 MG) BY MOUTH DAILY    Cerebrovascular accident (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 Take 25mg by mouth every evening.        warfarin 3 MG tablet    COUMADIN     Take 4.5 mg by mouth daily        * Notice:  This list has 10 medication(s) that are the same as other medications prescribed for you. Read the directions carefully, and ask your doctor or other care provider to review them with you.

## 2018-03-17 PROBLEM — Z71.89 ADVANCED DIRECTIVES, COUNSELING/DISCUSSION: Status: ACTIVE | Noted: 2018-01-01

## 2018-03-17 NOTE — ACP (ADVANCE CARE PLANNING)
Reviewed ACP with Daughter, Almaz who is family's/patients preferred proxy. No legal AD. Patient minimal capacity to participate due to prev CVAs. Can answer yes or no questions. Confirmed full code and tx of current conditions. Specific discussion of dysphagia and aspiration risk. Do want assessment and willing to modify diet but clear that they do not want PEG tube inserted for feeding.

## 2018-03-18 NOTE — ED AVS SNAPSHOT
[facility], Emergency Department    [address] [address]    Phone:  [phone]                                       Sohan Rednon   MRN: [id number]    Department:  [facility], Emergency Department   Date of Visit:  3/18/2018           Patient Information     Date Of Birth          1950        Your diagnoses for this visit were:     Constipation, unspecified constipation type        You were seen by Jose Carlos Gupta MD.        Discharge Instructions         Please make an appointment to follow up with Your Primary Care Provider in 7 days as needed.     Treating Constipation    Constipation is a common and often uncomfortable problem. Constipation means you have bowel movements fewer than 3 times per week, or strain to pass hard, dry stool. It can last a short time. Or it can be a problem that never seems to go away. The good news is that it can often be treated and controlled.  Eat more fiber  One of the best ways to help treat constipation is to increase your fiber intake. You can do this either through diet or by using fiber supplements. Fiber (in whole grains, fruits, and vegetables) adds bulk and absorbs water to soften the stool. This helps the stool pass through the colon more easily. When you increase your fiber intake, do it slowly to avoid side effects such as bloating. Also increase the amount of water that you drink. Eating more of the following foods can add fiber to your diet.    High-fiber cereals    Whole grains, bran, and brown rice    Vegetables such as carrots, broccoli, and greens    Fresh fruits (especially apples, pears, and dried fruits like raisins and apricots)    Nuts and legumes (especially beans such as lentils, kidney beans, and lima beans)  Get physically active  Exercise helps improve the working of your colon which helps ease constipation. Try to get some physical activity every day. If you haven t been active for a while, talk to your healthcare  provider before starting again.  Laxatives  Your healthcare provider may suggest an over-the-counter product to help ease your constipation. He or she may suggest the use of bulk-forming agents or laxatives. The use of laxatives, if used as directed, is common and safe. Follow directions carefully when using them. See your healthcare provider for new-onset constipation, or long-term constipation, to rule out other causes such as medicines or thyroid disease.  Date Last Reviewed: 7/1/2016 2000-2017 [employer]. [address] [place] [address]. All rights reserved. This information is not intended as a substitute for professional medical care. Always follow your healthcare professional's instructions.          Future Appointments        Provider Department Dept Phone Center    4/20/2018 10:00 AM Nora Winter MD [facility]ose [facility] [phone] [facility]      24 Hour Appointment Hotline       To make an appointment at any [facility], call [phone] ([phone]). If you don't have a family doctor or clinic, we will help you find one. [facility] are conveniently located to serve the needs of you and your family.             Review of your medicines      Our records show that you are taking the medicines listed below. If these are incorrect, please call your family doctor or clinic.        Dose / Directions Last dose taken    acetaminophen 325 MG tablet   Commonly known as:  TYLENOL   Dose:  650 mg   Quantity:  100 tablet        Take 2 tablets (650 mg) by mouth every 6 hours as needed for mild pain   Refills:  0        ALEK CONTOUR test strip   Quantity:  100 strip   Generic drug:  blood glucose monitoring        USE TO TEST BLOOD SUGAR 2 TIMES DAILY OR AS DIRECTED.   Refills:  2        bisacodyl 10 MG Suppository   Commonly known as:  DULCOLAX   Dose:  10 mg   Quantity:  5 suppository        Place 1 suppository (10 mg) rectally daily as needed for  constipation   Refills:  1        * blood glucose monitoring lancets   Quantity:  1 Box        Use to test blood sugars 2 times daily or as directed.   Refills:  3        * blood glucose monitoring lancets   Quantity:  100 each        USE TO TEST BLOOD SUGAR 2 TIMES DAILY OR AS DIRECTED   Refills:  2        calcium carbonate-vitamin D 500-400 MG-UNIT Tabs per tablet   Dose:  1 tablet   Quantity:  90 tablet        Take 1 tablet by mouth daily   Refills:  3        finasteride 5 MG tablet   Commonly known as:  PROSCAR   Dose:  5 mg   Quantity:  90 tablet        Take 1 tablet (5 mg) by mouth daily   Refills:  3        furosemide 20 MG tablet   Commonly known as:  LASIX   Dose:  20 mg   Quantity:  20 tablet        Take 1 tablet (20 mg) by mouth as needed   Refills:  11        gabapentin 100 MG capsule   Commonly known as:  NEURONTIN   Dose:  100 mg   Quantity:  60 capsule        Take 1 capsule (100 mg) by mouth 2 times daily as needed   Refills:  3        guaiFENesin 600 MG 12 hr tablet   Commonly known as:  MUCINEX   Dose:  600 mg   Quantity:  20 tablet        Take 1 tablet (600 mg) by mouth 2 times daily as needed for congestion   Refills:  0        ipratropium - albuterol 0.5 mg/2.5 mg/3 mL 0.5-2.5 (3) MG/3ML neb solution   Commonly known as:  DUONEB   Dose:  1 vial   Quantity:  360 mL        Take 1 vial (3 mLs) by nebulization every 6 hours as needed for shortness of breath / dyspnea, wheezing or other   Refills:  11        ketotifen 0.025 % Soln ophthalmic solution   Commonly known as:  ZADITOR/REFRESH ANTI-ITCH   Dose:  1 drop   Quantity:  1 Bottle        Place 1 drop into both eyes 2 times daily   Refills:  11        levETIRAcetam 750 MG tablet   Commonly known as:  KEPPRA   Quantity:  180 tablet        TAKE 1 TABLET (750 MG) BY MOUTH 2 TIMES DAILY   Refills:  3        loratadine 10 MG tablet   Commonly known as:  CLARITIN   Quantity:  90 tablet        TAKE 1 TABLET (10 MG) BY MOUTH DAILY   Refills:  2         losartan 25 MG tablet   Commonly known as:  COZAAR   Dose:  12.5 mg   Quantity:  15 tablet        Take 0.5 tablets (12.5 mg) by mouth daily   Refills:  3        melatonin 3 MG tablet   Dose:  3 mg        Take 1 tablet (3 mg) by mouth At Bedtime   Refills:  0        nitroGLYcerin 0.4 MG sublingual tablet   Commonly known as:  NITROSTAT   Dose:  0.4 mg   Quantity:  30 tablet        Place 1 tablet (0.4 mg) under the tongue every 5 minutes as needed for chest pain   Refills:  1        nystatin 806128 UNIT/GM Powd   Commonly known as:  MYCOSTATIN   Quantity:  60 g        Apply to affected areas of skin in the groin and on the scrotum three times a day as needed.   Refills:  3        * order for DME   Quantity:  1 Device        Equipment being ordered: Lift chair.  Please see indications and face-to-face encounter details in 2/3/15 Office Visit note.   Refills:  0        * order for DME   Quantity:  2 each        Equipment being ordered: Bilateral leg supports for manual wheelchair.   Refills:  0        * order for DME   Quantity:  1 each        Equipment being ordered: Reclining manual w/c with bilateral elevating leg rests.   Refills:  0        * order for DME   Quantity:  1 each        Equipment being ordered: Hospital Bed, fully electric.   Refills:  0        * order for DME   Quantity:  1 each        Equipment being ordered: Wheelchair, manual.   Refills:  0        * order for DME   Quantity:  1 each        Equipment being ordered: Wheelchair, manual, with elevated leg rests and tilt in space back.  Please fax to [facility]; I called them 11/26/16 and they requested the new order.  Face to face is in my 10/26/16 note.   Refills:  0        * order for DME   Quantity:  2 each        Equipment being ordered: Cushioned heel boots.   Refills:  0        * order for DME   Quantity:  2 each        Bilateral heel protective cushioning boots.  Dx: previous stroke with left hemiplegia.  Duration of need: 99 months.   Refills:  0         * order for DME   Quantity:  1 each        Equipment being ordered: Nebulizer   Refills:  11        oxyCODONE IR 5 MG tablet   Commonly known as:  ROXICODONE   Dose:  5 mg   Quantity:  30 tablet        Take 1 tablet (5 mg) by mouth every 4 hours as needed for moderate to severe pain   Refills:  0        PANTOPRAZOLE SODIUM PO   Dose:  20 mg        Take 20 mg by mouth every morning (before breakfast)   Refills:  0        ranitidine 75 MG tablet   Commonly known as:  ZANTAC   Dose:  75 mg   Quantity:  30 tablet        Take 1 tablet (75 mg) by mouth At Bedtime   Refills:  3        SENEXON-S 8.6-50 MG per tablet   Quantity:  60 tablet   Generic drug:  senna-docusate        TAKE 1-2 TABLETS BY MOUTH 2 TIMES DAILY AS NEEDED FOR CONSTIPATION   Refills:  3        simethicone 80 MG chewable tablet   Commonly known as:  MYLICON   Dose:  80 mg   Quantity:  180 tablet        Take 1 tablet (80 mg) by mouth 4 times daily   Refills:  5        tamsulosin 0.4 MG capsule   Commonly known as:  FLOMAX   Dose:  0.8 mg   Quantity:  60 capsule        Take 2 capsules (0.8 mg) by mouth daily   Refills:  11        thiamine 100 MG tablet   Quantity:  90 tablet        TAKE 1 TABLET (100 MG) BY MOUTH DAILY   Refills:  3        TOPROL XL PO        Take 50mg by mouth every morning. Take 25mg by mouth every evening.   Refills:  0        warfarin 3 MG tablet   Commonly known as:  COUMADIN   Dose:  4.5 mg        Take 4.5 mg by mouth daily   Refills:  0        * Notice:  This list has 11 medication(s) that are the same as other medications prescribed for you. Read the directions carefully, and ask your doctor or other care provider to review them with you.            Procedures and tests performed during your visit     ABO/Rh type and screen    CBC with platelets differential    CT Abdomen Pelvis w/o Contrast    Comprehensive metabolic panel    INR    Lipase    Partial thromboplastin time    Peripheral IV catheter    UA with Microscopic     "Urine Culture    Vascular Access Care Adult IP Consult      Orders Needing Specimen Collection     None      Pending Results     Date and Time Order Name Status Description    3/18/2018 2127 CT Abdomen Pelvis w/o Contrast Preliminary     3/18/2018 193 Urine Culture Preliminary             Pending Culture Results     Date and Time Order Name Status Description    3/18/2018 1934 Urine Culture Preliminary             Pending Results Instructions     If you had any lab results that were not finalized at the time of your Discharge, you can call the ED Lab Result RN at [phone]. You will be contacted by this team for any positive Lab results or changes in treatment. The nurses are available 7 days a week from 10A to 6:30P.  You can leave a message 24 hours per day and they will return your call.        Thank you for choosing [facility]       Thank you for choosing [facility] for your care. Our goal is always to provide you with excellent care. Hearing back from our patients is one way we can continue to improve our services. Please take a few minutes to complete the written survey that you may receive in the mail after you visit with us. Thank you!        [employer] Information     [employer] lets you send messages to your doctor, view your test results, renew your prescriptions, schedule appointments and more. To sign up, go to [email or web address] . Click on \"Log in\" on the left side of the screen, which will take you to the Welcome page. Then click on \"Sign up Now\" on the right side of the page.     You will be asked to enter the access code listed below, as well as some personal information. Please follow the directions to create your username and password.     Your access code is: [email or web address]  Expires: 2018  3:27 PM     Your access code will  in 90 days. If you need help or a new code, please call your [facility] clinic or [phone].        Care EveryWhere ID     This is your Care EveryWhere ID. This could " be used by other organizations to access your [facility] medical records  [id number]        Equal Access to Services     ALCON SKINNER : Hadii emily Rahman, jeana morrison, willie morse. So [facility] [phone].    ATENCIÓN: Si habla español, tiene a smith disposición servicios gratuitos de asistencia lingüística. Llame al [phone].    We comply with applicable federal civil rights laws and [place] laws. We do not discriminate on the basis of race, color, national origin, age, disability, sex, sexual orientation, or gender identity.            After Visit Summary       This is your record. Keep this with you and show to your community pharmacist(s) and doctor(s) at your next visit.

## 2018-03-18 NOTE — ED AVS SNAPSHOT
[facility], [place], Emergency Department    [address] [address]    Phone:  [phone]                                       Sohan Rendon   MRN: [id number]    Department:  [facility], Emergency Department   Date of Visit:  3/18/2018           After Visit Summary Signature Page     I have received my discharge instructions, and my questions have been answered. I have discussed any challenges I see with this plan with the nurse or doctor.    ..........................................................................................................................................  Patient/Patient Representative Signature      ..........................................................................................................................................  Patient Representative Print Name and Relationship to Patient    ..................................................               ................................................  Date                                            Time    ..........................................................................................................................................  Reviewed by Signature/Title    ...................................................              ..............................................  Date                                                            Time

## 2018-03-19 NOTE — ED PROVIDER NOTES
[place] EMERGENCY DEPARTMENT ([facility])  3/18/18   History     Chief Complaint   Patient presents with     Constipation     The history is provided by a relative and the patient. A  was used (Dustin (Daughter Interpreted)).     Sohan Rendon is a 68 year old male with a medical history significant for severe ICM s/p HM II LVAD as DT c/b recurrent device thrombi, recurrent hemodialysis and multiple embolic CVAs, recurrent GI/urogenital bleeding, CKD stage III, hypertension, latent TB and recurrent UTIs who presents to the Emergency Department for evaluation of constipation.  Patient has had lower, mid abdominal pain for the past 3 days and has not had a bowel movement in the past 3 days.  Patient has been using senna every night and has used a Dulcolax suppository.  The patient was told to use Dulcolax suppository as needed; the daughter states that this has relieved his constipation in the past.  They tried this today, but they have had no resolution of his constipation.  The daughter also notes that the patient has some abdominal distention.  The patient today has developed some rectal pain as well.  Patient used senna last night and this morning.  The patient notes that he was straining today while attempting to have a bowel movement and did have some pain in his chest, this resolved when he stopped straining.  Patient denies any difficulty breathing or fevers.  The patient does note that the last time he urinated he did have some discomfort with urination, he denies any discomfort earlier this morning.  Per review of patient's chart, patient was seen by me on 3/7/2018 for a cough; he states that the cough has improved.    I have reviewed the Medications, Allergies, Past Medical and Surgical History, and Social History in the [facility] system.    Past Medical History:   Diagnosis Date     Acute right MCA stroke (H) 6/2013    With L sided hemiparesis      Anemia of chronic disease      Baseline Hb 8-9     BPH (benign prostatic hyperplasia)      CHF (congestive heart failure), NYHA class IV (H) 10/9/2012    s/p HeartMate II.  Was on milrinone and dobutamine prior to LVAD      CKD (chronic kidney disease)     Baseline Cr=     Clostridium difficile colitis 12/2012      Dysphagia     PEG tube placed in 2012.  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  Normal carotid duplex 3/2014.       Esophagitis 12/2012    EGD with esophagitis and multiple douenal ulcers     Fracture of femoral neck, right, closed (H) 2/3/2015    Presumed pathologic as patient is non-weight-bearing and suffered no trauma.  Family declined operative repair during hospital stay 1/23 - 1/30/15.     Gastric and duodenal angiodysplasia with hemorrhage 6/18/2015     GERD (gastroesophageal reflux disease)      Gout      Hematuria      HTN (hypertension)     LDL=59 (3/29/2014)     Hyperlipaemia      Ischemic cardiomyopathy      Mitral regurgitation     s/p MVR with Carbomedics ring      Myocardial infarction 1998    In [facility] without intervention      Nonsenile cataract     BE     PFO (patent foramen ovale)     s/p closure (10/9/2012)     TB lung, latent     s/p 9 months INH in 2012        Past Surgical History:   Procedure Laterality Date     C CABG, VEIN, FIVE  2001     CATARACT IOL, RT/LT Right 11/19/2015     ENDOSCOPIC RETROGRADE CHOLANGIOPANCREATOGRAM N/A 5/9/2017    Procedure: COMBINED ENDOSCOPIC RETROGRADE CHOLANGIOPANCREATOGRAPHY, PLACE TUBE/STENT;  Endoscopic Retrograde Cholangiopancreatogram, Stent (Zimmon Biliary 7fr 12cm) Placement;  Surgeon: Cristo Grove MD;  Location: [facility] OR     ESOPHAGOSCOPY, GASTROSCOPY, DUODENOSCOPY (EGD), COMBINED N/A 6/10/2015    Procedure: COMBINED ESOPHAGOSCOPY, GASTROSCOPY, DUODENOSCOPY (EGD);  Surgeon: Edu Jenkins MD;  Location: [facility] GI     ESOPHAGOSCOPY, GASTROSCOPY, DUODENOSCOPY (EGD),  COMBINED N/A 6/15/2015    Procedure: COMBINED ESOPHAGOSCOPY, GASTROSCOPY, DUODENOSCOPY (EGD);  Surgeon: Yury Bonner MD;  Location: [facility] OR     H INSERT ICD  2/10/2011    And pacemaker.  Not BiV     INSERT VENTRICULAR ASSIST DEVICE LEFT (HEARTMATE II)  10/9/2012     IR GASTRO JEJUNOSTOMY TUBE PLACEMENT       PHACOEMULSIFICATION CLEAR CORNEA WITH STANDARD INTRAOCULAR LENS IMPLANT Right 11/19/2015    Procedure: PHACOEMULSIFICATION CLEAR CORNEA WITH STANDARD INTRAOCULAR LENS IMPLANT;  Surgeon: Violeta Cosme MD;  Location: [facility] OR     REPAIR PATENT FORAMEN OVALE  10/9/2012     REPAIR VALVE MITRAL  2/9/2012    28 mm Carbomedics 2/3 ring        Family History   Problem Relation Age of Onset     Family History Negative No family hx of      Glaucoma No family hx of      Macular Degeneration No family hx of      CANCER No family hx of      no skin cancer       Social History   Substance Use Topics     Smoking status: Former Smoker     Smokeless tobacco: Former User     Alcohol use No       No current facility-administered medications for this encounter.      Current Outpatient Prescriptions   Medication     losartan (COZAAR) 25 MG tablet     order for DME     ipratropium - albuterol 0.5 mg/2.5 mg/3 mL (DUONEB) 0.5-2.5 (3) MG/3ML neb solution     guaiFENesin (MUCINEX) 600 MG 12 hr tablet     tamsulosin (FLOMAX) 0.4 MG capsule     PANTOPRAZOLE SODIUM PO     blood glucose monitoring (ALEK MICROLET) lancets     acetaminophen (TYLENOL) 325 MG tablet     gabapentin (NEURONTIN) 100 MG capsule     ranitidine (ZANTAC) 75 MG tablet     warfarin (COUMADIN) 3 MG tablet     ALEK CONTOUR test strip     nystatin (MYCOSTATIN) 060057 UNIT/GM POWD     loratadine (CLARITIN) 10 MG tablet     order for DME     Metoprolol Succinate (TOPROL XL PO)     levETIRAcetam (KEPPRA) 750 MG tablet     furosemide (LASIX) 20 MG tablet     ketotifen (ZADITOR/REFRESH ANTI-ITCH) 0.025 % SOLN ophthalmic solution     SENEXON-S 8.6-50 MG per tablet      "bisacodyl (DULCOLAX) 10 MG Suppository     finasteride (PROSCAR) 5 MG tablet     calcium carbonate-vitamin D 500-400 MG-UNIT TABS per tablet     simethicone (MYLICON) 80 MG chewable tablet     oxyCODONE (ROXICODONE) 5 MG IR tablet     melatonin 3 MG tablet     Thiamine HCl (VITAMIN B-1) 100 MG TABS     order for DME     order for DME     order for DME     order for DME     order for DME     order for DME     nitroglycerin (NITROSTAT) 0.4 MG SL tablet     blood glucose monitoring (NO BRAND SPECIFIED) lancets     ORDER FOR DME        Allergies   Allergen Reactions     Seasonal Allergies          Review of Systems   Constitutional: Negative for fever.   Respiratory: Positive for cough (improving). Negative for shortness of breath and wheezing.    Cardiovascular: Positive for chest pain (with straining).   Gastrointestinal: Positive for abdominal distention, abdominal pain (lower mid), constipation and rectal pain.   Genitourinary: Positive for dysuria.   All other systems reviewed and are negative.      Physical Exam   BP: 95/79  Pulse: 77  Temp: 97.8  F (36.6  C)  Resp: 18  Height: 172.7 cm (5' 8\")  Weight: 82.6 kg (182 lb)  SpO2: 100 %      Physical Exam   Constitutional: No distress.   HENT:   Head: Atraumatic.   Mouth/Throat: Oropharynx is clear and moist. No oropharyngeal exudate.   Eyes: Pupils are equal, round, and reactive to light. No scleral icterus.   Cardiovascular: Normal rate, regular rhythm, normal heart sounds and intact distal pulses.    Pulmonary/Chest: Breath sounds normal. No respiratory distress.   Abdominal: Soft. Bowel sounds are normal. There is no tenderness.   Musculoskeletal: He exhibits no edema or tenderness.   Skin: Skin is warm. No rash noted. He is not diaphoretic.   Nursing note and vitals reviewed.      ED Course   7:35 PM  The patient was seen and examined by Jose Carlos Gupta MD in Room ED14.     ED Course     Procedures             Critical Care time:  none             Labs Ordered and " Resulted from Time of ED Arrival Up to the Time of Departure from the ED - No data to display         Assessments & Plan (with Medical Decision Making)      68 year old male with a medical history significant for severe ICM s/p HM II LVAD as DT c/b recurrent device thrombi, recurrent hemodialysis and multiple embolic CVAs, recurrent GI/urogenital bleeding, CKD stage III, hypertension, latent TB and recurrent UTIs who presents to the Emergency Department for evaluation of constipation. IV established, labs sent, reviewed and remarkable for Cr 1.88, INR 2.4, HgB 11.0. Given his story of constipation despite taking laxatives, a CT abd/pelvis was obtained to r/o SBO and this revealed NAD. While under observation in the ED patient had a large bowel movement with complete resolution of his symptoms. Patient advised to increase daily dose of senna to 2-4 tablets per day with plan to follow up in PCP office.      I have reviewed the nursing notes.    I have reviewed the findings, diagnosis, plan and need for follow up with the patient.    New Prescriptions    No medications on file       Final diagnoses:   Constipation, unspecified constipation type     I, Giovanni Hope, am serving as a trained medical scribe to document services personally performed by Jose Carlos Gupta MD, based on the provider's statements to me.   I, Jose Carlos Gupta MD, was physically present and have reviewed and verified the accuracy of this note documented by Giovanni Hope.    3/18/2018   [facility], EMERGENCY DEPARTMENT     Jose Carlos Gupta MD  03/26/18 0862

## 2018-03-19 NOTE — ED NOTES
Pt presents to ED with constipation for the last three days. Pt's daughter states they have tried multiple suppositories with no relief. Pt now having some abdominal and rectal discomfort. Pt has heartmate LVAD.

## 2018-03-19 NOTE — PROGRESS NOTES
ANTICOAGULATION FOLLOW-UP CLINIC VISIT    Patient Name:  Sohan Rendon  Date:  3/19/2018  Contact Type:  Telephone    SUBJECTIVE:     Patient Findings     Positives Other complaints (Pt in ER over the weekend with constipation.)           OBJECTIVE    INR   Date Value Ref Range Status   03/18/2018 2.42 (H) 0.86 - 1.14 Final     Chromogenic Factor 10   Date Value Ref Range Status   08/10/2014 24 (L) 70 - 130 % Final     Comment:     Therapeutic Range:  A Chromogenic Factor 10 level of approximately 20-40%   inversely correlates with an INR of 2-3 for patients receiving Warfarin.   Chromogenic Factor 10 levels below 20% indicate an INR greater than 3 and   levels above 40% indicate an INR less than 2.       Factor 2 Assay   Date Value Ref Range Status   07/21/2014 25 (L) 60 - 140 % Final     Comment:     Analyte Specific Reagents (ASRs) are used in many laboratory tests necessary   for   standard medical care and generally do not require FDA approval.  This test   was   developed and its performance characteristics determined by [facility].  It has not been cleared or approved by   the US Food and Drug Administration.         ASSESSMENT / PLAN  INR assessment THER    Recheck INR In: 2 WEEKS    INR Location Clinic      Anticoagulation Summary as of 3/19/2018     INR goal    Prior goal 1.8-2.5   Today's INR 2.42 (3/18/2018)   Maintenance plan 4.5 mg (3 mg x 1.5) on Sun, Tue, Thu; 3 mg (3 mg x 1) all other days   Full instructions 4.5 mg on Sun, Tue, Thu; 3 mg all other days   Weekly total 25.5 mg   Plan last modified Blanca Bah RN (3/5/2018)   Next INR check 4/2/2018   Priority INR   Target end date Indefinite    Indications   LVAD (left ventricular assist device) present [Z95.811]  Long-term (current) use of anticoagulants [Z79.01] [Z79.01]         Anticoagulation Episode Summary     INR check location     Preferred lab     [facility]    Comments Goal Range is 1.8-2.3  [facility] comes out to draw INR  Sofya Ph [phone]  Daughter Almaz Ph [phone]      Anticoagulation Care Providers     Provider Role Specialty Phone number    Evon Lemons MD Responsible Cardiology [phone]            See the Encounter Report to view Anticoagulation Flowsheet and Dosing Calendar (Go to Encounters tab in chart review, and find the Anticoagulation Therapy Visit)    Left message for patient with results and dosing recommendations. Asked patient to call back to report any missed doses, falls, signs and symptoms of bleeding or clotting, any changes in health, medication, or diet. Asked patient to call back with any questions or concerns.      Dafne Sanders, RN     ADDENDUM from 3/19:  Home care RN calls today re: an INR - the pt was contacted earlier today with instructions after an INR was done in the ED over the weekend.  Asha england RN is informed of this.  The next INR is requested in 1 wk vs 2 weeks since pt is an LVAD and this most recent INR is slightly out of the goal range.  Home Care did not do an INR today, since pt has this 2 days ago.  The constipation has now been relieved.   Seamus ESCOBAR

## 2018-03-19 NOTE — DISCHARGE INSTRUCTIONS
Please make an appointment to follow up with Your Primary Care Provider in 7 days as needed.     Treating Constipation    Constipation is a common and often uncomfortable problem. Constipation means you have bowel movements fewer than 3 times per week, or strain to pass hard, dry stool. It can last a short time. Or it can be a problem that never seems to go away. The good news is that it can often be treated and controlled.  Eat more fiber  One of the best ways to help treat constipation is to increase your fiber intake. You can do this either through diet or by using fiber supplements. Fiber (in whole grains, fruits, and vegetables) adds bulk and absorbs water to soften the stool. This helps the stool pass through the colon more easily. When you increase your fiber intake, do it slowly to avoid side effects such as bloating. Also increase the amount of water that you drink. Eating more of the following foods can add fiber to your diet.    High-fiber cereals    Whole grains, bran, and brown rice    Vegetables such as carrots, broccoli, and greens    Fresh fruits (especially apples, pears, and dried fruits like raisins and apricots)    Nuts and legumes (especially beans such as lentils, kidney beans, and lima beans)  Get physically active  Exercise helps improve the working of your colon which helps ease constipation. Try to get some physical activity every day. If you haven t been active for a while, talk to your healthcare provider before starting again.  Laxatives  Your healthcare provider may suggest an over-the-counter product to help ease your constipation. He or she may suggest the use of bulk-forming agents or laxatives. The use of laxatives, if used as directed, is common and safe. Follow directions carefully when using them. See your healthcare provider for new-onset constipation, or long-term constipation, to rule out other causes such as medicines or thyroid disease.  Date Last Reviewed: 7/1/2016     7390-2229 [employer]. [address] [place] [address], [place] [address]. All rights reserved. This information is not intended as a substitute for professional medical care. Always follow your healthcare professional's instructions.

## 2018-03-19 NOTE — MR AVS SNAPSHOT
Sohan [facility]   3/19/2018   Anticoagulation Therapy Visit    Description:  68 year old male   Provider:  Dafne Sanders, RN   [facility]           INR as of 3/19/2018     Today's INR 2.42 (3/18/2018)      Anticoagulation Summary as of 3/19/2018     INR goal    Prior goal 1.8-2.5   Today's INR 2.42 (3/18/2018)   Full instructions 4.5 mg on Sun, Tue, Thu; 3 mg all other days   Next INR check 4/2/2018    Indications   LVAD (left ventricular assist device) present [Z95.811]  Long-term (current) use of anticoagulants [Z79.01] [Z79.01]         March 2018 Details    Sun Mon Tue Wed Thu Fri Sat         1               2               3                 4               5               6               7               8               9               10                 11               12               13               14               15               16               17                 18               19      3 mg   See details      20      4.5 mg         21      3 mg         22      4.5 mg         23      3 mg         24      3 mg           25      4.5 mg         26      3 mg         27      4.5 mg         28      3 mg         29      4.5 mg         30      3 mg         31      3 mg          Date Details   03/19 This INR check               How to take your warfarin dose     To take:  3 mg Take 1 of the 3 mg tablets.    To take:  4.5 mg Take 1.5 of the 3 mg tablets.           April 2018 Details    Sun Mon Tue Wed Thu Fri Sat     1      4.5 mg         2            3               4               5               6               7                 8               9               10               11               12               13               14                 15               16               17               18               19               20               21                 22               23               24               25               26               27               28                  29 30                     Date Details   No additional details    Date of next INR:  4/2/2018         How to take your warfarin dose     To take:  3 mg Take 1 of the 3 mg tablets.    To take:  4.5 mg Take 1.5 of the 3 mg tablets.

## 2018-03-20 NOTE — PROGRESS NOTES
Late entry. CM notified client was in the ED 3/18 for constipation. CM called and spoke with client's daughter Lisa on 3/19 to check on client. She states client was doing better. No abdominal pain. She states he had a BM when he was in the ED. No questions or concerns.   Nena Salazar RN  [facility]   [phone]

## 2018-03-26 NOTE — PROGRESS NOTES
ANTICOAGULATION FOLLOW-UP CLINIC VISIT    Patient Name:  Sohan Rendon  Date:  3/26/2018  Contact Type:  Telephone    SUBJECTIVE:     Patient Findings     Positives No Problem Findings           OBJECTIVE    INR   Date Value Ref Range Status   03/26/2018 2.8  Final     Chromogenic Factor 10   Date Value Ref Range Status   08/10/2014 24 (L) 70 - 130 % Final     Comment:     Therapeutic Range:  A Chromogenic Factor 10 level of approximately 20-40%   inversely correlates with an INR of 2-3 for patients receiving Warfarin.   Chromogenic Factor 10 levels below 20% indicate an INR greater than 3 and   levels above 40% indicate an INR less than 2.       Factor 2 Assay   Date Value Ref Range Status   07/21/2014 25 (L) 60 - 140 % Final     Comment:     Analyte Specific Reagents (ASRs) are used in many laboratory tests necessary   for   standard medical care and generally do not require FDA approval.  This test   was   developed and its performance characteristics determined by [facility].  It has not been cleared or approved by   the US Food and Drug Administration.         ASSESSMENT / PLAN  INR assessment THER    Recheck INR In: 1 WEEK    [facility]R Location [facility]      Anticoagulation Summary as of 3/26/2018     INR goal    Prior goal 1.8-2.5   Today's INR 2.8!   Maintenance plan 4.5 mg (3 mg x 1.5) on Sun, Tue, Thu; 3 mg (3 mg x 1) all other days   Full instructions 3/27: 3 mg; Otherwise 4.5 mg on Sun, Tue, Thu; 3 mg all other days   Weekly total 25.5 mg   Plan last modified Blanca Bah RN (3/5/2018)   Next INR check 4/2/2018   Priority INR   Target end date Indefinite    Indications   LVAD (left ventricular assist device) present [Z95.811]  Long-term (current) use of anticoagulants [Z79.01] [Z79.01]         Anticoagulation Episode Summary     INR check location     Preferred lab     Send INR reminders to [facility]    Comments Goal Range is 1.8-2.3  [facility]  Sofya Ph [phone]  Daughter Almaz Ph [phone]      Anticoagulation Care Providers     Provider Role Specialty Phone number    Evon Lemons MD Responsible Cardiology [phone]            See the Encounter Report to view Anticoagulation Flowsheet and Dosing Calendar (Go to Encounters tab in chart review, and find the Anticoagulation Therapy Visit)    Spoke with Asha [facility] nurse.    Dafne Sanders RN

## 2018-03-26 NOTE — MR AVS SNAPSHOT
Sohan [facility]   3/26/2018   Anticoagulation Therapy Visit    Description:  68 year old male   Provider:  Dafne Sanders RN   Department:  [facility]           INR as of 3/26/2018     Today's INR 2.8!      Anticoagulation Summary as of 3/26/2018     INR goal    Prior goal 1.8-2.5   Today's INR 2.8!   Full instructions 3/27: 3 mg; Otherwise 4.5 mg on Sun, Tue, Thu; 3 mg all other days   Next INR check 4/2/2018    Indications   LVAD (left ventricular assist device) present [Z95.811]  Long-term (current) use of anticoagulants [Z79.01] [Z79.01]         March 2018 Details    Sun Mon Tue Wed Thu Fri Sat         1               2               3                 4               5               6               7               8               9               10                 11               12               13               14               15               16               17                 18               19               20               21               22               23               24                 25               26      3 mg   See details      27      3 mg         28      3 mg         29      4.5 mg         30      3 mg         31      3 mg          Date Details   03/26 This INR check               How to take your warfarin dose     To take:  3 mg Take 1 of the 3 mg tablets.    To take:  4.5 mg Take 1.5 of the 3 mg tablets.           April 2018 Details    Sun Mon Tue Wed Thu Fri Sat     1      4.5 mg         2            3               4               5               6               7                 8               9               10               11               12               13               14                 15               16               17               18               19               20               21                 22               23               24               25               26               27               28                 29               30                      Date Details   No additional details    Date of next INR:  4/2/2018         How to take your warfarin dose     To take:  3 mg Take 1 of the 3 mg tablets.    To take:  4.5 mg Take 1.5 of the 3 mg tablets.

## 2018-04-02 NOTE — PROGRESS NOTES
ANTICOAGULATION FOLLOW-UP CLINIC VISIT    Patient Name:  Sohan Rendon  Date:  4/2/2018  Contact Type:  Telephone    SUBJECTIVE:     Patient Findings     Comments The INR rosa elena despite a smaller dose of warfarin last week.  Cautious dosing this week           OBJECTIVE    INR Protime   Date Value Ref Range Status   04/02/2018 3.0 (A) 0.86 - 1.14 Final     Chromogenic Factor 10   Date Value Ref Range Status   08/10/2014 24 (L) 70 - 130 % Final     Comment:     Therapeutic Range:  A Chromogenic Factor 10 level of approximately 20-40%   inversely correlates with an INR of 2-3 for patients receiving Warfarin.   Chromogenic Factor 10 levels below 20% indicate an INR greater than 3 and   levels above 40% indicate an INR less than 2.       Factor 2 Assay   Date Value Ref Range Status   07/21/2014 25 (L) 60 - 140 % Final     Comment:     Analyte Specific Reagents (ASRs) are used in many laboratory tests necessary   for   standard medical care and generally do not require FDA approval.  This test   was   developed and its performance characteristics determined by [facility].  It has not been cleared or approved by   the US Food and Drug Administration.         ASSESSMENT / PLAN  INR assessment SUPRA    Recheck INR In: 3 DAYS    [facility]R Location [facility]      Anticoagulation Summary as of 4/2/2018     INR goal    Prior goal 1.8-2.5   Today's INR 3.0!   Maintenance plan 4.5 mg (3 mg x 1.5) on Sun, Tue, Thu; 3 mg (3 mg x 1) all other days   Full instructions 4/2: 1.5 mg; Otherwise 4.5 mg on Sun, Tue, Thu; 3 mg all other days   Weekly total 25.5 mg   Plan last modified Blanca Bah RN (3/5/2018)   Next INR check 4/5/2018   Priority INR   Target end date Indefinite    Indications   LVAD (left ventricular assist device) present [Z95.811]  Long-term (current) use of anticoagulants [Z79.01] [Z79.01]         Anticoagulation Episode Summary     INR check location     Preferred  lab     Send INR reminders to [facility]    Comments Goal Range is 1.8-2.3  [facility] comes out to draw INR  Sofya Ph [phone]  Daughter Almaz Ph [phone]      Anticoagulation Care Providers     Provider Role Specialty Phone number    Evon Lemons MD Responsible Cardiology [phone]            See the Encounter Report to view Anticoagulation Flowsheet and Dosing Calendar (Go to Encounters tab in chart review, and find the Anticoagulation Therapy Visit)    Spoke with home care nurse Niles Godinez RN

## 2018-04-02 NOTE — MR AVS SNAPSHOT
Sohan [facility]   4/2/2018   Anticoagulation Therapy Visit    Description:  68 year old male   Provider:  Amy Godinez, RN   Department:  [facility]           INR as of 4/2/2018     Today's INR 3.0!      Anticoagulation Summary as of 4/2/2018     INR goal    Prior goal 1.8-2.5   Today's INR 3.0!   Full instructions 4/2: 1.5 mg; Otherwise 4.5 mg on Sun, Tue, Thu; 3 mg all other days   Next INR check 4/5/2018    Indications   LVAD (left ventricular assist device) present [Z95.811]  Long-term (current) use of anticoagulants [Z79.01] [Z79.01]         April 2018 Details    Sun Mon Tue Wed Thu Fri Sat     1               2      1.5 mg   See details      3      4.5 mg         4      3 mg         5            6               7                 8               9               10               11               12               13               14                 15               16               17               18               19               20               21                 22               23               24               25               26               27               28                 29               30                     Date Details   04/02 This INR check       Date of next INR:  4/5/2018         How to take your warfarin dose     To take:  1.5 mg Take 0.5 of a 3 mg tablet.    To take:  3 mg Take 1 of the 3 mg tablets.    To take:  4.5 mg Take 1.5 of the 3 mg tablets.

## 2018-04-05 NOTE — PROGRESS NOTES
ANTICOAGULATION FOLLOW-UP CLINIC VISIT    Patient Name:  Sohan Rendon  Date:  4/5/2018  Contact Type:  Telephone    SUBJECTIVE:     Patient Findings     Comments Patient has constipation issues.  Appetite is okay.           OBJECTIVE    INR   Date Value Ref Range Status   04/05/2018 2.7  Final     Chromogenic Factor 10   Date Value Ref Range Status   08/10/2014 24 (L) 70 - 130 % Final     Comment:     Therapeutic Range:  A Chromogenic Factor 10 level of approximately 20-40%   inversely correlates with an INR of 2-3 for patients receiving Warfarin.   Chromogenic Factor 10 levels below 20% indicate an INR greater than 3 and   levels above 40% indicate an INR less than 2.       Factor 2 Assay   Date Value Ref Range Status   07/21/2014 25 (L) 60 - 140 % Final     Comment:     Analyte Specific Reagents (ASRs) are used in many laboratory tests necessary   for   standard medical care and generally do not require FDA approval.  This test   was   developed and its performance characteristics determined by [facility].  It has not been cleared or approved by   the US Food and Drug Administration.         ASSESSMENT / PLAN  No question data found.  Anticoagulation Summary as of 4/5/2018     INR goal    Prior goal 1.8-2.5   Today's INR 2.7!   Maintenance plan No maintenance plan   Full instructions 4/5: 3 mg; 4/6: 3 mg; 4/7: 3 mg; 4/8: 3 mg   Weekly total 25.5 mg   Plan last modified Blanca Bah RN (4/5/2018)   Next INR check 4/9/2018   Priority INR   Target end date Indefinite    Indications   LVAD (left ventricular assist device) present [Z95.811]  Long-term (current) use of anticoagulants [Z79.01] [Z79.01]         Anticoagulation Episode Summary     INR check location     Preferred lab     Send INR reminders to [facility]    Comments Goal Range is 1.8-2.3  [facility] comes out to draw INR  Sofya Ph [phone]  Daughter Almaz Ph [phone]       Anticoagulation Care Providers     Provider Role Specialty Phone number    Evon Lemons MD Responsible Cardiology [phone]            See the Encounter Report to view Anticoagulation Flowsheet and Dosing Calendar (Go to Encounters tab in chart review, and find the Anticoagulation Therapy Visit)    Spoke with Niles Bah RN

## 2018-04-05 NOTE — MR AVS SNAPSHOT
Sohan [facility]   4/5/2018   Anticoagulation Therapy Visit    Description:  68 year old male   Provider:  Blanca Bah, RN   Department:  [facility]           INR as of 4/5/2018     Today's INR 2.7!      Anticoagulation Summary as of 4/5/2018     INR goal    Prior goal 1.8-2.5   Today's INR 2.7!   Full instructions 4/5: 3 mg; 4/6: 3 mg; 4/7: 3 mg; 4/8: 3 mg   Next INR check 4/9/2018    Indications   LVAD (left ventricular assist device) present [Z95.811]  Long-term (current) use of anticoagulants [Z79.01] [Z79.01]         April 2018 Details    Sun Mon Tue Wed Thu Fri Sat     1               2               3               4               5      3 mg   See details      6      3 mg         7      3 mg           8      3 mg         9            10               11               12               13               14                 15               16               17               18               19               20               21                 22               23               24               25               26               27               28                 29               30                     Date Details   04/05 This INR check       Date of next INR:  4/9/2018         How to take your warfarin dose     To take:  3 mg Take 1 of the 3 mg tablets.

## 2018-04-07 NOTE — PROGRESS NOTES
"D: Received call from pt's daughter regarding pt's distended abd.  She states she is concerned that he may have \"too much fluid\".  She denies that he is SOB.  She reports that his PIs are at his baseline of 4.5-5s.  Denies any swelling to ankles.  They are unable to weigh him due to his inability to stand.  She reports that he has had the distended abd for about 2 weeks.  He has not had a BM in 2 days.  I:  Encouraged pt to take senna as ordered.  Call if SOB, increased PIs.  A:  Pt's daughter verbalized understanding.    "

## 2018-04-09 NOTE — MR AVS SNAPSHOT
Sohan [facility]   4/9/2018   Anticoagulation Therapy Visit    Description:  68 year old male   Provider:  Amy Godinez, RN   Department:  [facility]           INR as of 4/9/2018     Today's INR 1.8      Anticoagulation Summary as of 4/9/2018     INR goal    Prior goal 1.8-2.5   Today's INR 1.8   Full instructions 4/9: 4.5 mg; 4/10: 4.5 mg   Next INR check 4/11/2018    Indications   LVAD (left ventricular assist device) present [Z95.811]  Long-term (current) use of anticoagulants [Z79.01] [Z79.01]         April 2018 Details    Sun Mon Tue Wed Thu Fri Sat     1               2               3               4               5               6               7                 8               9      4.5 mg   See details      10      4.5 mg         11            12               13               14                 15               16               17               18               19               20               21                 22               23               24               25               26               27               28                 29               30                     Date Details   04/09 This INR check       Date of next INR:  4/11/2018         How to take your warfarin dose     To take:  4.5 mg Take 1.5 of the 3 mg tablets.

## 2018-04-09 NOTE — PROGRESS NOTES
ANTICOAGULATION FOLLOW-UP CLINIC VISIT    Patient Name:  Sohan Rendon  Date:  4/9/2018  Contact Type:  Telephone    SUBJECTIVE:     Patient Findings     Comments Pt returns to the  on 4/11 for appointment so will check next INR on that date.  On 4/11 the  Yancy should be phoned with date for next INR & dosing instructions.  She will inform the pt - her phone: [phone]           OBJECTIVE    INR Protime   Date Value Ref Range Status   04/09/2018 1.8 (A) 0.86 - 1.14 Final     Chromogenic Factor 10   Date Value Ref Range Status   08/10/2014 24 (L) 70 - 130 % Final     Comment:     Therapeutic Range:  A Chromogenic Factor 10 level of approximately 20-40%   inversely correlates with an INR of 2-3 for patients receiving Warfarin.   Chromogenic Factor 10 levels below 20% indicate an INR greater than 3 and   levels above 40% indicate an INR less than 2.       Factor 2 Assay   Date Value Ref Range Status   07/21/2014 25 (L) 60 - 140 % Final     Comment:     Analyte Specific Reagents (ASRs) are used in many laboratory tests necessary   for   standard medical care and generally do not require FDA approval.  This test   was   developed and its performance characteristics determined by [facility].  It has not been cleared or approved by   the [place] Food and Drug Administration.         ASSESSMENT / PLAN  INR assessment THER    Recheck INR In: 2 DAYS    INR Location [facility]      Anticoagulation Summary as of 4/9/2018     INR goal    Prior goal 1.8-2.5   Today's INR 1.8   Maintenance plan No maintenance plan   Full instructions 4/9: 4.5 mg; 4/10: 4.5 mg   Weekly total 25.5 mg   Plan last modified Blanca Bah RN (4/5/2018)   Next INR check 4/11/2018   Priority INR   Target end date Indefinite    Indications   LVAD (left ventricular assist device) present [Z95.811]  Long-term (current) use of anticoagulants [Z79.01] [Z79.01]         Anticoagulation Episode  Summary     INR check location     Preferred lab     Send INR reminders to [facility]    Comments Goal Range is 1.8-2.3  [facility] comes out to draw INR  Yancy @ [phone]  Daughter Almaz Ph [phone]      Anticoagulation Care Providers     Provider Role Specialty Phone number    Evon Lemons MD Responsible Cardiology [phone]            See the Encounter Report to view Anticoagulation Flowsheet and Dosing Calendar (Go to Encounters tab in chart review, and find the Anticoagulation Therapy Visit)    Spoke with patient's home care nurse Tahir.  Gave them their new warfarin recommendation.  No changes in health, medication, or diet. No missed doses, no falls. No signs or symptoms of bleed or clotting.      Amy Godinez RN

## 2018-04-11 PROBLEM — T82.9XXA LEFT VENTRICULAR ASSIST DEVICE (LVAD) COMPLICATION: Status: ACTIVE | Noted: 2017-01-01

## 2018-04-11 NOTE — NURSING NOTE
Chief Complaint   Patient presents with     Follow Up For     Vad Follow Up      Vitals were taken and medications were reconciled.     Deirdre Cruz RMA  9:12 AM

## 2018-04-11 NOTE — NURSING NOTE
1). PUMP DATA   II:   Flow: --- L/min,    Speed: 8800 RPMs,     PI: 4.5 ,  Power: 5.3 Slaughter,      Primary controller   Data downloaded: Yes   Equipment and driveline assessed for damage: Yes       2). ALARMS  Alarms reported by patient since last pump evaluation: Yes  Alarms or other finding noted during pump data history and alarm download: Yes  Frequent PI events.  Pwrs as high as 17.  Low Speed Operation alarm noted.    Action Taken:  Reviewed data with patient: Yes      3). DRESSING CHANGE / DRIVELINE ASSESSMENT  Dressing change completed today: No  Appearance of Driveline site: Per pt, C, D, I    Driveline stabilization: Method: [employer]  [ Teaching reinforced on need for stabilization of Driveline. ]

## 2018-04-11 NOTE — IP AVS SNAPSHOT
MRN:[id number]                      After Visit Summary   4/11/2018    Sohan Rendon    MRN: [id number]           Thank you!     Thank you for choosing [facility] for your care. Our goal is always to provide you with excellent care. Hearing back from our patients is one way we can continue to improve our services. Please take a few minutes to complete the written survey that you may receive in the mail after you visit with us. Thank you!        Patient Information     Date Of Birth          1950        Designated Caregiver       Most Recent Value    Caregiver    Will someone help with your care after discharge? yes    Name of designated caregiver Antionette    Phone number of caregiver [phone]    Caregiver address [address] [place] [address] [address]      About your hospital stay     You were admitted on:  April 11, 2018 You last received care in the:  [address] [facility]    You were discharged on:  April 17, 2018        Reason for your hospital stay       You were admitted to the hospital for concern for a clot in your heart pump. Please notify the LVAD coordinator for any concerning pump alarms, blood in the urine, or changes in symptoms.                  Who to Call     For medical emergencies, please call 911.  For non-urgent questions about your medical care, please call your primary care provider or clinic, [phone]          Attending Provider     Provider Specialty    Harinder Tellez MD Emergency Medicine    Jaleel Lebron MD Cardiology       Primary Care Provider Office Phone # Fax #    Shilpi AgostoCLAIRE [phone]      After Care Instructions     Activity       Your activity upon discharge: activity as tolerated            Diet       Follow this diet upon discharge: 2 gram NA diet            Wound care and dressings       Instructions to care for your wound at home: LVAD dressing changes as prior to admission. .                  Follow-up  Appointments     Adult [facility] Follow-up and recommended labs and tests       Appointments on [facility] and/or [facility] (with [facility] or [facility] provider or service). Call [phone] if you haven't heard regarding these appointments within 7 days of discharge.  Repeat INR per [facility] RN Thursday.   Follow up in Cardiology clinic in 1-2 weeks. LVAD Coordinator to contact you regarding a time and date.                  Your next 10 appointments already scheduled     May 04, 2018 11:00 AM CDT   Home Follow Up with Raghu Almodovar MD   [facility] ([facility])    [address]  [address]  [place] [address] [phone]              Additional Services     Home care nursing referral           Home care nursing referral       [facility]   Phone: [phone]   Fax: [phone]    Palliative Care Program.   RN obdulia post hospitalization.   Assess: vital signs, respiratory and cardiac status, activity tolerance, hydration, nutritional status.   Med set up and management.   INR lab draws; with results to the [facility]. (Next INR due on 4/19/18.)    Your provider has ordered home care nursing services. If you have not been contacted within 2 days of your discharge please call the inpatient department phone number at [phone] .            Medication Therapy Management Referral       Reason for referral:  on more than 5 medications and managing chronic disease    This service is designed to help you get the most from your medications.  A specially trained pharmacist will work closely with you and your doctors  to solve any problems related to your medications and to help you get the   best results from taking them.      The Medication Therapy Management staff will call you to schedule an appointment.                  Future tests that were ordered for you     NEBULIZER                 Warfarin Instruction     You have started taking a medicine called warfarin.  "This is a blood-thinning medicine (anticoagulant). It helps prevent and treat blood clots.      Before leaving the hospital, make sure you know how much to take and how long to take it.      You will need regular blood tests to make sure your blood is clotting safely. It is very important to see your doctor for regular blood tests.    Talk to your doctor before taking any new medicine (this includes over-the-counter drugs and herbal products). Many medicines can interact with warfarin. This may cause more bleeding or too much clotting.     Eating a lot of vitamin K--found in green, leafy vegetables--can change the way warfarin works in your body. Do NOT avoid these foods. Instead, try to eat the same amount each day.     Bleeding is the most common side-effect of warfarin. You may notice bleeding gums, a bloody nose, bruises and bleeding longer when you cut yourself. See a doctor at once if:   o You cough up blood  o You find blood in your stool (poop)  o You have a deep cut, or a cut that bleeds longer than 10 minutes   o You have a bad cut, hard fall, accident or hit your head (go to urgent care or the emergency room).    For women who can get pregnant: This medicine can harm an unborn baby. Be very careful not to get pregnant while taking this medicine. If you think you might be pregnant, call your doctor right away.    For more information, read \"Guide to Warfarin Therapy,  the booklet you received in the hospital.        Pending Results     No orders found from 4/9/2018 to 4/12/2018.            Statement of Approval     Ordered          04/17/18 1254  I have reviewed and agree with all the recommendations and orders detailed in this document.  EFFECTIVE NOW     Approved and electronically signed by:  Zuleika Patterson APRN CNP             Admission Information     Date & Time Provider Department Dept. Phone    4/11/2018 Jaleel Lebron MD [address] [facility] [phone]      Your Vitals Were     " "Blood Pressure Pulse Temperature Respirations Height Weight    89/73 (BP Location: Right arm) 58 97.8  F (36.6  C) (Oral) 18 1.727 m (5' 8\") 94.3 kg (208 lb)    Pulse Oximetry BMI (Body Mass Index)                99% 31.63 kg/m2          TicketsNow Information     TicketsNow lets you send messages to your doctor, view your test results, renew your prescriptions, schedule appointments and more. To sign up, go to [email or web address] . Click on \"Log in\" on the left side of the screen, which will take you to the Welcome page. Then click on \"Sign up Now\" on the right side of the page.     You will be asked to enter the access code listed below, as well as some personal information. Please follow the directions to create your username and password.     Your access code is: [email or web address]  Expires: 2018  3:27 PM     Your access code will  in 90 days. If you need help or a new code, please call your [facility] clinic or [phone].        Care EveryWhere ID     This is your Care EveryWhere ID. This could be used by other organizations to access your [facility] medical records  [id number]        Equal Access to Services     ALCON SKINNER : Rocky matuteo Josiah, waaxda luqadaha, qaybta kaalmada adeegyada, willie quiñonez. So [facility] [phone].    ATENCIÓN: Si habla español, tiene a smith disposición servicios gratuitos de asistencia lingüística. Llame al [phone].    We comply with applicable federal civil rights laws and [place] laws. We do not discriminate on the basis of race, color, national origin, age, disability, sex, sexual orientation, or gender identity.               Review of your medicines      START taking        Dose / Directions    doxycycline monohydrate 100 MG capsule   Used for:  Community acquired pneumonia of left lung, unspecified part of lung        Dose:  100 mg   Take 1 capsule (100 mg) by mouth 2 times daily for 7 days   Quantity:  14 capsule   Refills:  0 "         CONTINUE these medicines which may have CHANGED, or have new prescriptions. If we are uncertain of the size of tablets/capsules you have at home, strength may be listed as something that might have changed.        Dose / Directions    ketotifen 0.025 % Soln ophthalmic solution   Commonly known as:  ZADITOR/REFRESH ANTI-ITCH   This may have changed:  when to take this   Used for:  Allergic conjunctivitis, bilateral        Dose:  1 drop   Place 1 drop into both eyes 2 times daily   Quantity:  1 Bottle   Refills:  11       losartan 25 MG tablet   Commonly known as:  COZAAR   This may have changed:  how much to take   Used for:  Chronic systolic congestive heart failure (H)        Dose:  25 mg   Take 1 tablet (25 mg) by mouth daily   Quantity:  15 tablet   Refills:  3       senna-docusate 8.6-50 MG per tablet   Commonly known as:  SENEXON-S   This may have changed:  See the new instructions.   Used for:  Other constipation        Dose:  2 tablet   Take 2 tablets by mouth 2 times daily as needed for constipation   Quantity:  60 tablet   Refills:  1       warfarin 1 MG tablet   Commonly known as:  COUMADIN   This may have changed:    - how much to take  - additional instructions   Used for:  LVAD (left ventricular assist device) present (H)        Dose:  2.5 mg   Take 2.5 tablets (2.5 mg) by mouth daily   Quantity:  30 tablet   Refills:  1         CONTINUE these medicines which have NOT CHANGED        Dose / Directions    acetaminophen 325 MG tablet   Commonly known as:  TYLENOL        Dose:  650 mg   Take 2 tablets (650 mg) by mouth every 6 hours as needed for mild pain   Quantity:  100 tablet   Refills:  0       ALEK CONTOUR test strip   Used for:  Hypoglycemia   Generic drug:  blood glucose monitoring        USE TO TEST BLOOD SUGAR 2 TIMES DAILY OR AS DIRECTED.   Quantity:  100 strip   Refills:  2       bisacodyl 10 MG Suppository   Commonly known as:  DULCOLAX   Used for:  Drug-induced constipation         Dose:  10 mg   Place 1 suppository (10 mg) rectally daily as needed for constipation   Quantity:  5 suppository   Refills:  1       * blood glucose monitoring lancets   Used for:  Diabetes mellitus, type 2 (H)        Use to test blood sugars 2 times daily or as directed.   Quantity:  1 Box   Refills:  3       * blood glucose monitoring lancets   Used for:  Diabetes mellitus, type 2 (H)        USE TO TEST BLOOD SUGAR 2 TIMES DAILY OR AS DIRECTED   Quantity:  100 each   Refills:  2       finasteride 5 MG tablet   Commonly known as:  PROSCAR   Used for:  Incomplete bladder emptying        Dose:  5 mg   Take 1 tablet (5 mg) by mouth daily   Quantity:  90 tablet   Refills:  3       furosemide 20 MG tablet   Commonly known as:  LASIX   Used for:  Chronic systolic congestive heart failure (H)        Dose:  20 mg   Take 1 tablet (20 mg) by mouth as needed   Quantity:  20 tablet   Refills:  11       ipratropium - albuterol 0.5 mg/2.5 mg/3 mL 0.5-2.5 (3) MG/3ML neb solution   Commonly known as:  DUONEB   Used for:  Pulmonary atelectasis        Dose:  1 vial   Take 1 vial (3 mLs) by nebulization every 6 hours as needed for shortness of breath / dyspnea, wheezing or other   Quantity:  360 mL   Refills:  11       levETIRAcetam 750 MG tablet   Commonly known as:  KEPPRA   Used for:  Convulsions, unspecified convulsion type (H)        TAKE 1 TABLET (750 MG) BY MOUTH 2 TIMES DAILY   Quantity:  180 tablet   Refills:  3       loratadine 10 MG tablet   Commonly known as:  CLARITIN   Used for:  Allergic rhinitis        TAKE 1 TABLET (10 MG) BY MOUTH DAILY   Quantity:  90 tablet   Refills:  2       MELATONIN PO        Dose:  5 mg   Take 5 mg by mouth At Bedtime   Refills:  0       nitroGLYcerin 0.4 MG sublingual tablet   Commonly known as:  NITROSTAT   Used for:  Coronary artery disease involving native coronary artery with unstable angina pectoris (H)        Dose:  0.4 mg   Place 1 tablet (0.4 mg) under the tongue every 5 minutes as  needed for chest pain   Quantity:  30 tablet   Refills:  1       nystatin 219427 UNIT/GM Powd   Commonly known as:  MYCOSTATIN   Used for:  Intertriginous dermatitis associated with moisture        Apply to affected areas of skin in the groin and on the scrotum three times a day as needed.   Quantity:  60 g   Refills:  3       order for DME   Used for:  Fracture of femoral neck, right, closed, initial encounter, Stroke (H), CHF (congestive heart failure), NYHA class IV (H)        Equipment being ordered: Lift chair.  Please see indications and face-to-face encounter details in 2/3/15 Office Visit note.   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   Quantity:  1 each   Refills:  0       * order for DME   Used for:  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),  Displaced fracture of right femoral neck, closed, with nonunion, subsequent encounter        Equipment being ordered: Wheelchair, manual, with elevated leg rests and tilt in space back.  Please fax to [facility]; I called them 11/26/16 and they requested the new order.  Face to face is in my 10/26/16 note.   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   Quantity:  2 each   Refills:  0       * order for DME   Used for:  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)        Bilateral heel protective cushioning boots.  Dx: previous stroke with left hemiplegia.  Duration of need: 99 months.   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, subsequent encounter        Dose:  5 mg   Take 1 tablet (5 mg) by mouth every 4 hours as needed for moderate to severe pain   Quantity:  30 tablet   Refills:  0       PANTOPRAZOLE SODIUM PO        Dose:  20 mg   Take 20 mg by mouth every morning (before breakfast)   Refills:  0       simethicone 80 MG chewable tablet   Commonly known as:  MYLICON   Used for:  Slow transit constipation        Dose:  80 mg   Take 1 tablet (80 mg) by mouth 4 times daily   Quantity:  180 tablet   Refills:  5       tamsulosin 0.4 MG capsule   Commonly known as:  FLOMAX   Used for:  Incomplete bladder emptying        Dose:  0.8 mg   Take 2 capsules (0.8 mg) by mouth daily   Quantity:  60 capsule   Refills:  11       thiamine 100 MG tablet   Used for:  Cerebrovascular accident (CVA) due to embolism of right middle cerebral artery (H)        TAKE 1 TABLET (100 MG) BY MOUTH DAILY   Quantity:  90  tablet   Refills:  3       TOPROL XL PO        Take 50mg by mouth every morning. Take 25mg by mouth every evening.   Refills:  0       * Notice:  This list has 10 medication(s) that are the same as other medications prescribed for you. Read the directions carefully, and ask your doctor or other care provider to review them with you.         Where to get your medicines      These medications were sent to [facility] - [place], [place] - [address]  [address] [address]     Phone:  [phone]     doxycycline monohydrate 100 MG capsule    senna-docusate 8.6-50 MG per tablet    warfarin 1 MG tablet                Protect others around you: Learn how to safely use, store and throw away your medicines at www.disposemymeds.org.        ANTIBIOTIC INSTRUCTION     You've Been Prescribed an Antibiotic - Now What?  Your healthcare team thinks that you or your loved one might have an infection. Some infections can be treated with antibiotics, which are powerful, life-saving drugs. Like all medications, antibiotics have side effects and should only be used when necessary. There are some important things you should know about your antibiotic treatment.      Your healthcare team may run tests before you start taking an antibiotic.    Your team may take samples (e.g., from your blood, urine or other areas) to run tests to look for bacteria. These test can be important to determine if you need an antibiotic at all and, if you do, which antibiotic will work best.      Within a few days, your healthcare team might change or even stop your antibiotic.    Your team may start you on an antibiotic while they are working to find out what is making you sick.    Your team might change your antibiotic because test results show that a different antibiotic would be better to treat your infection.    In some cases, once your team has more information, they learn that you do not need an antibiotic at  all. They may find out that you don't have an infection, or that the antibiotic you're taking won't work against your infection. For example, an infection caused by a virus can't be treated with antibiotics. Staying on an antibiotic when you don't need it is more likely to be harmful than helpful.      You may experience side effects from your antibiotic.    Like all medications, antibiotics have side effects. Some of these can be serious.    Let you healthcare team know if you have any known allergies when you are admitted to the hospital.    One significant side effect of nearly all antibiotics is the risk of severe and sometimes deadly diarrhea caused by Clostridium difficile (C. Difficile). This occurs when a person takes antibiotics because some good germs are destroyed. Antibiotic use allows C. diificile to take over, putting patients at high risk for this serious infection.    As a patient or caregiver, it is important to understand your or your loved one's antibiotic treatment. It is especially important for caregivers to speak up when patients can't speak for themselves. Here are some important questions to ask your healthcare team.    What infection is this antibiotic treating and how do you know I have that infection?    What side effects might occur from this antibiotic?    How long will I need to take this antibiotic?    Is it safe to take this antibiotic with other medications or supplements (e.g., vitamins) that I am taking?     Are there any special directions I need to know about taking this antibiotic? For example, should I take it with food?    How will I be monitored to know whether my infection is responding to the antibiotic?    What tests may help to make sure the right antibiotic is prescribed for me?      Information provided by:  www.cdc.gov/getsmart  U.S. Department of Health and Human Services  Centers for disease Control and Prevention  National Center for Emerging and Zoonotic  Infectious Diseases  Division of Healthcare Quality Promotion             Medication List: This is a list of all your medications and when to take them. Check marks below indicate your daily home schedule. Keep this list as a reference.      Medications           Morning Afternoon Evening Bedtime As Needed    acetaminophen 325 MG tablet   Commonly known as:  TYLENOL   Take 2 tablets (650 mg) by mouth every 6 hours as needed for mild pain   Last time this was given:  650 mg on 4/16/2018 10:48 PM   Next Dose Due:  Anytime, if needed                                     ALEK CONTOUR test strip   USE TO TEST BLOOD SUGAR 2 TIMES DAILY OR AS DIRECTED.   Generic drug:  blood glucose monitoring                                      bisacodyl 10 MG Suppository   Commonly known as:  DULCOLAX   Place 1 suppository (10 mg) rectally daily as needed for constipation   Next Dose Due:  Anytime, if needed                                   * blood glucose monitoring lancets   Use to test blood sugars 2 times daily or as directed.                                      * blood glucose monitoring lancets   USE TO TEST BLOOD SUGAR 2 TIMES DAILY OR AS DIRECTED                                      doxycycline monohydrate 100 MG capsule   Take 1 capsule (100 mg) by mouth 2 times daily for 7 days                                finasteride 5 MG tablet   Commonly known as:  PROSCAR   Take 1 tablet (5 mg) by mouth daily   Last time this was given:  5 mg on 4/17/2018  9:56 AM   Next Dose Due:  Tomorrow am                                   furosemide 20 MG tablet   Commonly known as:  LASIX   Take 1 tablet (20 mg) by mouth as needed   Next Dose Due:  Anytime, if needed                                   ipratropium - albuterol 0.5 mg/2.5 mg/3 mL 0.5-2.5 (3) MG/3ML neb solution   Commonly known as:  DUONEB   Take 1 vial (3 mLs) by nebulization every 6 hours as needed for shortness of breath / dyspnea, wheezing or other   Next Dose Due:  Anytime, if  needed                                    ketotifen 0.025 % Soln ophthalmic solution   Commonly known as:  ZADITOR/REFRESH ANTI-ITCH   Place 1 drop into both eyes 2 times daily   Last time this was given:  1 drop on 4/17/2018  9:57 AM   Next Dose Due:  Tonight 9pm                                      levETIRAcetam 750 MG tablet   Commonly known as:  KEPPRA   TAKE 1 TABLET (750 MG) BY MOUTH 2 TIMES DAILY   Last time this was given:  750 mg on 4/17/2018  9:56 AM   Next Dose Due:  Tonight 9pm                                      loratadine 10 MG tablet   Commonly known as:  CLARITIN   TAKE 1 TABLET (10 MG) BY MOUTH DAILY   Next Dose Due:  Tomorrow am                                   losartan 25 MG tablet   Commonly known as:  COZAAR   Take 1 tablet (25 mg) by mouth daily   Last time this was given:  25 mg on 4/17/2018  9:56 AM   Next Dose Due:  Tomorrow am                                   MELATONIN PO   Take 5 mg by mouth At Bedtime   Last time this was given:  5 mg on 4/16/2018  9:05 PM   Next Dose Due:  Tonight 9pm                                   nitroGLYcerin 0.4 MG sublingual tablet   Commonly known as:  NITROSTAT   Place 1 tablet (0.4 mg) under the tongue every 5 minutes as needed for chest pain   Next Dose Due:  Anytime, if needed                                   nystatin 675612 UNIT/GM Powd   Commonly known as:  MYCOSTATIN   Apply to affected areas of skin in the groin and on the scrotum three times a day as needed.   Next Dose Due:  As needed                                    order for DME   Equipment being ordered: Lift chair.  Please see indications and face-to-face encounter details in 2/3/15 Office Visit note.                                * order for DME   Equipment being ordered: Bilateral leg supports for manual wheelchair.                                * order for DME   Equipment being ordered: Reclining manual w/c with bilateral elevating leg rests.                                * order for  DME   Equipment being ordered: Hospital Bed, fully electric.                                * order for DME   Equipment being ordered: Wheelchair, manual.                                * order for DME   Equipment being ordered: Wheelchair, manual, with elevated leg rests and tilt in space back.  Please fax to [facility]; I called them 11/26/16 and they requested the new order.  Face to face is in my 10/26/16 note.                                * order for DME   Equipment being ordered: Cushioned heel boots.                                * order for DME   Bilateral heel protective cushioning boots.  Dx: previous stroke with left hemiplegia.  Duration of need: 99 months.                                * order for DME   Equipment being ordered: Nebulizer                                oxyCODONE IR 5 MG tablet   Commonly known as:  ROXICODONE   Take 1 tablet (5 mg) by mouth every 4 hours as needed for moderate to severe pain   Next Dose Due:  Anytime, if needed                                   PANTOPRAZOLE SODIUM PO   Take 20 mg by mouth every morning (before breakfast)   Last time this was given:  40 mg on 4/17/2018  6:47 AM   Next Dose Due:  Tomorrow am                                   senna-docusate 8.6-50 MG per tablet   Commonly known as:  SENEXON-S   Take 2 tablets by mouth 2 times daily as needed for constipation   Last time this was given:  2 tablets on 4/16/2018  8:58 AM   Next Dose Due:  Anytime, if needed                                   simethicone 80 MG chewable tablet   Commonly known as:  MYLICON   Take 1 tablet (80 mg) by mouth 4 times daily   Last time this was given:  80 mg on 4/17/2018 12:58 PM   Next Dose Due:  Today 4pm                                            tamsulosin 0.4 MG capsule   Commonly known as:  FLOMAX   Take 2 capsules (0.8 mg) by mouth daily   Last time this was given:  0.8 mg on 4/17/2018  9:57 AM   Next Dose Due:  Tomorrow am                                   thiamine 100 MG  tablet   TAKE 1 TABLET (100 MG) BY MOUTH DAILY   Last time this was given:  100 mg on 4/17/2018  9:56 AM   Next Dose Due:  Tomorrow am                                   TOPROL XL PO   Take 50mg by mouth every morning. Take 25mg by mouth every evening.   Last time this was given:  50 mg on 4/17/2018  9:57 AM   Next Dose Due:  Tonight 9pm                                      warfarin 1 MG tablet   Commonly known as:  COUMADIN   Take 2.5 tablets (2.5 mg) by mouth daily   Last time this was given:  3.5 mg on 4/16/2018  5:48 PM   Next Dose Due:  Tonight 6pm                                   * Notice:  This list has 10 medication(s) that are the same as other medications prescribed for you. Read the directions carefully, and ask your doctor or other care provider to review them with you.

## 2018-04-11 NOTE — H&P
[facility]  Cardiology History and Physical      Patient: Sohan Rendon      Date of service: 2018  : 1950      Attending physician: Dr. Lebron  MRN: [id number]    PCP: Shilpi Agosto  Cardiologist: Dr. Evon Lemons          Chief complaint:   Chest Pain           History of Present Illness:   Mr. Rendon is a 68 year old male with a past medical history notable for HFwREF secondary to ICM s/p LVAD HM II in  as DT s/p ICD , s/p MVR by carbomedics ring, multiple ischemic CVAs with residual left sided weakness, latent TB, HTN, Hyperlipidemia, PFO s/p closure in , CKD Stage III, BPH, GERD, Gout, Anemia of CD.. 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    He presented to cardiology clinic today for routine follow up and complained of R sided chest pain. His LVAD was interrogated in the clinic with multiple power surges noted up to 15.6 with speed drops concerning for thrombus so was sent to the ED for admission. He notes that the R sided chest pain started about 1 hour prior to being seen in clinic. He has not attempted any medication prior to presentation. He notes persistent abdominal distention with last BM Monday. He notes persistent productive cough, but feels this is slightly improved. He denies HOOK, PND, orthopnea, nausea, vomiting, diarrhea, hematochezia, and melena. They are not able to weigh him at home given he is bed bound. He continues to follow a low sodium diet.      He has also had a hx of chronic cough for the past few months. He has a hx of latent TB. Chest CT done in 2018 did show multiple nodules that will require follow up but did not show evidence of active pulmonary TB. He does not have any fevers, chills, hemoptysis. No unintended weight loss.     Given his hx of hematuria and  multiple bleeds was not bolused with heparin in the ER and was placed on a heparin drip. He was otherwise hemodynamically stable. His daughter at the bedside noted that he was at his baseline at this point.      VAD Interrogation on 4/11/2018 in clinic: Power spikes up to 15.6 with speed drops concerning for worsening thrombus.           Review of Symptoms:   10-point ROS reviewed & found negative w/ exceptions noted in the HPI.          Past Medical History:     Past Medical History:   Diagnosis Date     Acute right MCA stroke (H) 6/2013    With L sided hemiparesis      Anemia of chronic disease     Baseline Hb 8-9     BPH (benign prostatic hyperplasia)      CHF (congestive heart failure), NYHA class IV (H) 10/9/2012    s/p HeartMate II.  Was on milrinone and dobutamine prior to LVAD      CKD (chronic kidney disease)     Baseline Cr=     Clostridium difficile colitis 12/2012      Dysphagia     PEG tube placed in 2012.  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  Normal carotid duplex 3/2014.       Esophagitis 12/2012    EGD with esophagitis and multiple douenal ulcers     Fracture of femoral neck, right, closed (H) 2/3/2015    Presumed pathologic as patient is non-weight-bearing and suffered no trauma.  Family declined operative repair during hospital stay 1/23 - 1/30/15.     Gastric and duodenal angiodysplasia with hemorrhage 6/18/2015     GERD (gastroesophageal reflux disease)      Gout      Hematuria      HTN (hypertension)     LDL=59 (3/29/2014)     Hyperlipaemia      Ischemic cardiomyopathy      Mitral regurgitation     s/p MVR with Carbomedics ring      Myocardial infarction 1998    In [facility] without intervention      Nonsenile cataract     BE     PFO (patent foramen ovale)     s/p closure (10/9/2012)     TB lung, latent     s/p 9 months INH in 2012        Past Surgical History:   Procedure Laterality Date     C  CABG, VEIN, FIVE  2001     CATARACT IOL, RT/LT Right 11/19/2015     ENDOSCOPIC RETROGRADE CHOLANGIOPANCREATOGRAM N/A 5/9/2017    Procedure: COMBINED ENDOSCOPIC RETROGRADE CHOLANGIOPANCREATOGRAPHY, PLACE TUBE/STENT;  Endoscopic Retrograde Cholangiopancreatogram, Stent (Zimmon Biliary 7fr 12cm) Placement;  Surgeon: Cristo Grove MD;  Location: [facility] OR     ESOPHAGOSCOPY, GASTROSCOPY, DUODENOSCOPY (EGD), COMBINED N/A 6/10/2015    Procedure: COMBINED ESOPHAGOSCOPY, GASTROSCOPY, DUODENOSCOPY (EGD);  Surgeon: Edu Jenkins MD;  Location: [facility] GI     ESOPHAGOSCOPY, GASTROSCOPY, DUODENOSCOPY (EGD), COMBINED N/A 6/15/2015    Procedure: COMBINED ESOPHAGOSCOPY, GASTROSCOPY, DUODENOSCOPY (EGD);  Surgeon: Yury Bonner MD;  Location: [facility] OR     H INSERT ICD  2/10/2011    And pacemaker.  Not BiV     INSERT VENTRICULAR ASSIST DEVICE LEFT (HEARTMATE II)  10/9/2012     IR GASTRO JEJUNOSTOMY TUBE PLACEMENT       PHACOEMULSIFICATION CLEAR CORNEA WITH STANDARD INTRAOCULAR LENS IMPLANT Right 11/19/2015    Procedure: PHACOEMULSIFICATION CLEAR CORNEA WITH STANDARD INTRAOCULAR LENS IMPLANT;  Surgeon: Violeta Cosme MD;  Location: [facility] OR     REPAIR PATENT FORAMEN OVALE  10/9/2012     REPAIR VALVE MITRAL  2/9/2012    28 mm Carbomedics 2/3 ring               Allergies:     Allergies   Allergen Reactions     Seasonal Allergies              Medications:       No current facility-administered medications on file prior to encounter.   Current Outpatient Prescriptions on File Prior to Encounter:  losartan (COZAAR) 25 MG tablet Take 0.5 tablets (12.5 mg) by mouth daily   tamsulosin (FLOMAX) 0.4 MG capsule Take 2 capsules (0.8 mg) by mouth daily   PANTOPRAZOLE SODIUM PO Take 20 mg by mouth every morning (before breakfast)   acetaminophen (TYLENOL) 325 MG tablet Take 2 tablets (650 mg) by mouth every 6 hours as needed for mild pain   nystatin (MYCOSTATIN) 583884 UNIT/GM POWD Apply to affected areas of skin in the groin and on the  scrotum three times a day as needed.   loratadine (CLARITIN) 10 MG tablet TAKE 1 TABLET (10 MG) BY MOUTH DAILY   Metoprolol Succinate (TOPROL XL PO) Take 50mg by mouth every morning. Take 25mg by mouth every evening.   levETIRAcetam (KEPPRA) 750 MG tablet TAKE 1 TABLET (750 MG) BY MOUTH 2 TIMES DAILY   furosemide (LASIX) 20 MG tablet Take 1 tablet (20 mg) by mouth as needed   ketotifen (ZADITOR/REFRESH ANTI-ITCH) 0.025 % SOLN ophthalmic solution Place 1 drop into both eyes 2 times daily (Patient taking differently: Place 1 drop into both eyes daily )   SENEXON-S 8.6-50 MG per tablet TAKE 1-2 TABLETS BY MOUTH 2 TIMES DAILY AS NEEDED FOR CONSTIPATION   bisacodyl (DULCOLAX) 10 MG Suppository Place 1 suppository (10 mg) rectally daily as needed for constipation   finasteride (PROSCAR) 5 MG tablet Take 1 tablet (5 mg) by mouth daily   simethicone (MYLICON) 80 MG chewable tablet Take 1 tablet (80 mg) by mouth 4 times daily   oxyCODONE (ROXICODONE) 5 MG IR tablet Take 1 tablet (5 mg) by mouth every 4 hours as needed for moderate to severe pain   Thiamine HCl (VITAMIN B-1) 100 MG TABS TAKE 1 TABLET (100 MG) BY MOUTH DAILY   order for DME Equipment being ordered: Nebulizer   ipratropium - albuterol 0.5 mg/2.5 mg/3 mL (DUONEB) 0.5-2.5 (3) MG/3ML neb solution Take 1 vial (3 mLs) by nebulization every 6 hours as needed for shortness of breath / dyspnea, wheezing or other   blood glucose monitoring (ALEK MICROLET) lancets USE TO TEST BLOOD SUGAR 2 TIMES DAILY OR AS DIRECTED   ALEK CONTOUR test strip USE TO TEST BLOOD SUGAR 2 TIMES DAILY OR AS DIRECTED.   order for DME Bilateral heel protective cushioning boots.  Dx: previous stroke with left hemiplegia.  Duration of need: 99 months.   order for DME Equipment being ordered: Cushioned heel boots.   order for DME Equipment being ordered: Wheelchair, manual, with elevated leg rests and tilt in space back.  Please fax to [facility]; I called them 11/26/16 and they requested the new  "order.  Face to face is in my 10/26/16 note.   order for DME Equipment being ordered: Wheelchair, manual.   order for DME Equipment being ordered: Hospital Bed, fully electric.   order for DME Equipment being ordered: Reclining manual w/c with bilateral elevating leg rests.   order for DME Equipment being ordered: Bilateral leg supports for manual wheelchair.   nitroglycerin (NITROSTAT) 0.4 MG SL tablet Place 1 tablet (0.4 mg) under the tongue every 5 minutes as needed for chest pain   blood glucose monitoring (NO BRAND SPECIFIED) lancets Use to test blood sugars 2 times daily or as directed.   ORDER FOR DME Equipment being ordered: Lift chair.Please see indications and face-to-face encounter details in 2/3/15 Office Visit note.             Family History:     Family History   Problem Relation Age of Onset     Family History Negative No family hx of      Glaucoma No family hx of      Macular Degeneration No family hx of      CANCER No family hx of      no skin cancer               Social History:     Social History     Social History     Marital status:      Spouse name: N/A     Number of children: N/A     Years of education: N/A     Occupational History     Not on file.     Social History Main Topics     Smoking status: Former Smoker     Smokeless tobacco: Former User     Alcohol use No     Drug use: No     Sexual activity: Not on file     Other Topics Concern     Not on file     Social History Narrative             Physical Exam:   BP 94/76  Pulse 61  Temp 97.8  F (36.6  C) (Oral)  Resp 16  Ht 1.727 m (5' 8\")  SpO2 100%  BMI 27.37 kg/m2  Temp (24hrs), Av.8  F (36.6  C), Min:97.8  F (36.6  C), Max:97.8  F (36.6  C)  Gen: NAD  HEENT: anicteric, MMM, oropharynx clear  CV: LVAD hum, JVP flat   Resp: CTAB anteriorly   Abd: +BS, soft, NTND, driveline emerging from RUQ with clean bandage overtop without surrounding erythema or tenderness  Ext: no LE edema   : Mcarthur in place, blood in catheter   Neuro: " does not move left side at baseline  Psych: normal affect           Data:   Labs:  All labs and data reviewed by me    Labs notable for   Cr of 2 (baseline)  LDH pending  INR of 2.04 (previously 1.8 on 4/9/18)  Hemoglobin of 9.8 (10.4)    Plasma Free Hemoglobin of 90    EKG reviewed and shows paced rhtyhm with rates in the 50s-60s    CT chest from March 2018 reviewed   IMPRESSION:   1. Linear subpleural nondependent consolidative and groundglass  opacities are most consistent with scarring or atelectasis. Infection  is not entirely excluded, but considered less likely.  2. Scattered pulmonary nodules, most of which are stable since at  least 2014, one which was not clearly identified in 2014, but is  stable from 5/6/2017. Recommend attention on follow-up imaging.   3. Postprocedural changes of automatic implantable cardiac  defibrillator implantation, LVAD device placement, and biliary  stenting.  4. There is significant motion artifact as well as streak artifact  from the metallic devices, which mildly limits the overall evaluation.        Assessment/Plan:   Mr. 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   He present to [facility] on [facility]18 for evaluation of chest pain with power spikes and low velocities on his HMII with elevated pfHgb concerning for LVAD thrombosis.     # LVAD with Low PIs, High Flows and [place]  # ICM s/p HM II LVAD as DT c/b Recurrent Device Thrombi, Recurrent Hemolysis, GI/Urogenital Bleeding, and Multiple Embolic  CVAs, NYHA Class IIIB, ACC/AHA Stage D  Patient initially developed chest pain with LVAD interrogation in clinic today showing [place] as high as 17. Low speed operation alarm also noted concerning for thrombus. Could also be related to a suck down event and less likely hypovolemia (given power spikes). Plasma free hgb elevated to 90 more c/w thrombus. LDH pending (multiple hemolyzed samples). Patient's INR has been therapeutic for the past few months except for the check on 4/9 that was 1.8.   - Trend LDH and pfHgb  - RAMP study ordered  - Continue Warfarin - Goal 1.8-2.5.  - Continue home arb  - 25mg of Toprol XL qday (home dose 50 qam and 25 qhs)  - Hold home lasix (taken prn)  - High Intensity Hep Gtt.     #Chronic Cough  #Hx of Latent TB  No hemoptysis/fevers/chills to suggest active pulm tb. CT chest without evidence of active tb in march 2018. Will watch closely. Needs follow up of his pulmonary nodules.     #CKDIII - Baseline Cr 1.8-2.2  Creatinine at baseline.     #Hx of CVA  #Hx of Seizures  Continue PTA Keppra    #HTN  - Continue PTA losartan 12.5mg qday    #Chronic Normocytic Anemia  Roughly stable.     #BPH  - Continue PTA finasteride    #GERD  - Increased dose to 40mg of Pantoprazole (PTA 20mg)     #Insomnia  - Continue PTA melatonin    Code: Full code   Diet: 2g NA and 2L Fluid restriction  PPx: High intensity hep gtt, PO protonix     Dispo: Pending resolution of likely thrombus     Plan of care discussed with Dr. Bernardino Dupree MD  Internal Medicine Resident   Cardiology Service  [phone]     I have reviewed today's vital signs, notes, medications, labs and imaging.  I have also seen and examined the patient and agree with the findings and plan as outlined above.  Pt is well known to our service and briefly is a 67 yo male with endstge heart failure supported by LVAD with course complicated by multiple CVAs with residual left sided hemiparesis, HTN, CKD, GERD, Gout and PMH c/w pump  thromboses.  Daughters report power spikes and right sided CP.  Denies fevers, chills, NS, emesis or orthopnea.  On exam HR 64 with BP 90/70. Lungs clear and LVAD hum.  LDH 1235 with Cr 1.76.  Assessment: Pt with endstage heart failure supported with LVAD with hx of CVAs and previous pump thrombosis now with power surges and elevated LDH.  Will add high intensity heparin for now and not any other agents due to previous CVAs.  Pt and daughters agree with plan.     Jaleel Lebron MD, PhD  Professor, Heart Failure and Cardiac Transplantation  [facility]

## 2018-04-11 NOTE — ED NOTES
Pt BIBA from clinic for CP which pt states is now resolved. Per pt's daughter, clinic is concerned for blood clot in LVAD based on the numbers.

## 2018-04-11 NOTE — IP AVS SNAPSHOT
[address] [address]    Phone:  [phone]                                       After Visit Summary   4/11/2018    Sohan Rendon    MRN: [id number]           After Visit Summary Signature Page     I have received my discharge instructions, and my questions have been answered. I have discussed any challenges I see with this plan with the nurse or doctor.    ..........................................................................................................................................  Patient/Patient Representative Signature      ..........................................................................................................................................  Patient Representative Print Name and Relationship to Patient    ..................................................               ................................................  Date                                            Time    ..........................................................................................................................................  Reviewed by Signature/Title    ...................................................              ..............................................  Date                                                            Time

## 2018-04-11 NOTE — LETTER
Transition Communication Hand-off for Care Transitions to Next Level of Care Provider    Name: Sohan Rendon  : 1950  MRN #: [id number]  Primary Care Provider: Shilpi Agosto     Primary Clinic: [address] [address]     Reason for Hospitalization:  Complication involving left ventricular assist device (LVAD), initial encounter [T82.9XXA]  Admit Date/Time: 2018 10:48 AM  Discharge Date: 18  Payor Source: Payor: [employer] / Plan: [employer] / Product Type:HMO  Readmission Assessment Measure (LEANDRO) Risk Score/category: elevated  Reason for Communication Hand-off Referral: Fragility  Discharge Plan:   Concern for non-adherence with plan of care: no  Discharge Needs Assessment:  Needs       Most Recent Value    Other Resources Other (see comment)    Home Care [facility] [phone], Fax: [phone]    Other -- [[facility]: [phone]]      Follow-up specialty is recommended: Yes    Follow-up plan:  Future Appointments  Date Time Provider Department Center          2018 10:00 AM [facility]   2018 10:00 AM [facility]   2018 11:00 AM Raghu Almodovar MD [facility] [place] [facility]       Any outstanding tests or procedures:        Referrals     Future Labs/Procedures    Home care nursing referral     Home care nursing referral     Comments:    [facility]   Phone: [phone]   Fax: [phone]    Palliative Care Program.   RN eval post hospitalization.   Assess: vital signs, respiratory and cardiac status, activity tolerance, hydration, nutritional status.   Med set up and management.   INR lab draws; with results to the [facility]. (Next INR due on 18.)    Your provider has ordered home care nursing services. If you have not been contacted within 2 days of your discharge please call the inpatient department phone number at [phone] .     Medication Therapy Management Referral     Comments:    Reason for referral:  on more than 5 medications and managing chronic disease    This service is designed to help you get the most from your medications.  A specially trained pharmacist will work closely with you and your doctors  to solve any problems related to your medications and to help you get the   best results from taking them.      The Medication Therapy Management staff will call you to schedule an appointment.        Supplies     Future Labs/Procedures    NEBULIZER           Key Recommendations:  Post hospitalization follow up.     JAYLEN KLEIN RN CC

## 2018-04-11 NOTE — PROGRESS NOTES
HPI:   Mr. 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 He presents to clinic for routine follow up. He was evaluated in ED on 3/18/18 for constipation, which was resolved prior to discharge.     He complains of right sided chest pain that started about 1 hour ago when getting onto the stretcher. He notes the pain to be worse with movement of his right arm. He He denies lightheadedness, dizziness, palpitations, SOB, diaphoresis, or nausea associated with chest pain. He does not note anything to worsen or improve the pain. He has not attempted any medication prior to presentation. He notes persistent abdominal distention with last BM Monday. He notes persistent productive cough, but feels this is slightly improved. He denies HOOK, PND, orthopnea, nausea, vomiting, diarrhea, hematochezia, and melena. They are not able to weigh him at home given he is bed bound. He continues to follow a low sodium diet.        VAD Interrogation on 4/11/2018: VAD interrogation reviewed with VAD coordinator. Agree with findings. Power spikes up to 15.6 with speed drops concerning for worsening thrombus.     PAST MEDICAL HISTORY:  Past Medical History:   Diagnosis Date     Acute right MCA stroke (H) 6/2013    With L sided hemiparesis      Anemia of chronic disease     Baseline Hb 8-9     BPH (benign prostatic hyperplasia)      CHF (congestive heart failure), NYHA class IV (H) 10/9/2012    s/p HeartMate II.  Was on milrinone and dobutamine prior to LVAD      CKD  (chronic kidney disease)     Baseline Cr=     Clostridium difficile colitis 12/2012      Dysphagia     PEG tube placed in 2012.  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  Normal carotid duplex 3/2014.       Esophagitis 12/2012    EGD with esophagitis and multiple douenal ulcers     Fracture of femoral neck, right, closed (H) 2/3/2015    Presumed pathologic as patient is non-weight-bearing and suffered no trauma.  Family declined operative repair during hospital stay 1/23 - 1/30/15.     Gastric and duodenal angiodysplasia with hemorrhage 6/18/2015     GERD (gastroesophageal reflux disease)      Gout      Hematuria      HTN (hypertension)     LDL=59 (3/29/2014)     Hyperlipaemia      Ischemic cardiomyopathy      Mitral regurgitation     s/p MVR with Carbomedics ring      Myocardial infarction 1998    In [facility] without intervention      Nonsenile cataract     BE     PFO (patent foramen ovale)     s/p closure (10/9/2012)     TB lung, latent     s/p 9 months INH in 2012        FAMILY HISTORY:  Family History   Problem Relation Age of Onset     Family History Negative No family hx of      Glaucoma No family hx of      Macular Degeneration No family hx of      CANCER No family hx of      no skin cancer       SOCIAL HISTORY:  Social History     Social History     Marital status:      Spouse name: N/A     Number of children: N/A     Years of education: N/A     Social History Main Topics     Smoking status: Former Smoker     Smokeless tobacco: Former User     Alcohol use No     Drug use: No     Sexual activity: Not on file     Other Topics Concern     Not on file     Social History Narrative       CURRENT MEDICATIONS:  Outpatient Medications Prior to Visit   Medication Sig Dispense Refill     losartan (COZAAR) 25 MG tablet Take 0.5 tablets (12.5 mg) by mouth daily 15 tablet 3     order for DME Equipment being ordered:  Nebulizer 1 each 11     tamsulosin (FLOMAX) 0.4 MG capsule Take 2 capsules (0.8 mg) by mouth daily 60 capsule 11     PANTOPRAZOLE SODIUM PO Take 20 mg by mouth every morning (before breakfast)       blood glucose monitoring (ALEK MICROLET) lancets USE TO TEST BLOOD SUGAR 2 TIMES DAILY OR AS DIRECTED 100 each 2     acetaminophen (TYLENOL) 325 MG tablet Take 2 tablets (650 mg) by mouth every 6 hours as needed for mild pain 100 tablet 0     ranitidine (ZANTAC) 75 MG tablet Take 1 tablet (75 mg) by mouth At Bedtime 30 tablet 3     warfarin (COUMADIN) 3 MG tablet Take 4.5 mg by mouth daily       ALEK CONTOUR test strip USE TO TEST BLOOD SUGAR 2 TIMES DAILY OR AS DIRECTED. 100 strip 2     nystatin (MYCOSTATIN) 346111 UNIT/GM POWD Apply to affected areas of skin in the groin and on the scrotum three times a day as needed. 60 g 3     loratadine (CLARITIN) 10 MG tablet TAKE 1 TABLET (10 MG) BY MOUTH DAILY 90 tablet 2     order for DME Bilateral heel protective cushioning boots.  Dx: previous stroke with left hemiplegia.  Duration of need: 99 months. 2 each 0     Metoprolol Succinate (TOPROL XL PO) Take 50mg by mouth every morning. Take 25mg by mouth every evening.       levETIRAcetam (KEPPRA) 750 MG tablet TAKE 1 TABLET (750 MG) BY MOUTH 2 TIMES DAILY 180 tablet 3     furosemide (LASIX) 20 MG tablet Take 1 tablet (20 mg) by mouth as needed 20 tablet 11     ketotifen (ZADITOR/REFRESH ANTI-ITCH) 0.025 % SOLN ophthalmic solution Place 1 drop into both eyes 2 times daily 1 Bottle 11     SENEXON-S 8.6-50 MG per tablet TAKE 1-2 TABLETS BY MOUTH 2 TIMES DAILY AS NEEDED FOR CONSTIPATION 60 tablet 3     bisacodyl (DULCOLAX) 10 MG Suppository Place 1 suppository (10 mg) rectally daily as needed for constipation 5 suppository 1     finasteride (PROSCAR) 5 MG tablet Take 1 tablet (5 mg) by mouth daily 90 tablet 3     calcium carbonate-vitamin D 500-400 MG-UNIT TABS per tablet Take 1 tablet by mouth daily 90 tablet 3     simethicone  (MYLICON) 80 MG chewable tablet Take 1 tablet (80 mg) by mouth 4 times daily (Patient taking differently: Take 80 mg by mouth every 6 hours as needed ) 180 tablet 5     oxyCODONE (ROXICODONE) 5 MG IR tablet Take 1 tablet (5 mg) by mouth every 4 hours as needed for moderate to severe pain 30 tablet 0     melatonin 3 MG tablet Take 1 tablet (3 mg) by mouth At Bedtime       Thiamine HCl (VITAMIN B-1) 100 MG TABS TAKE 1 TABLET (100 MG) BY MOUTH DAILY 90 tablet 3     order for DME Equipment being ordered: Cushioned heel boots. 2 each 0     order for DME Equipment being ordered: Wheelchair, manual, with elevated leg rests and tilt in space back.  Please fax to [employer]; I called them 11/26/16 and they requested the new order.  Face to face is in my 10/26/16 note. 1 each 0     order for DME Equipment being ordered: Wheelchair, manual. 1 each 0     order for DME Equipment being ordered: Hospital Bed, fully electric. 1 each 0     order for DME Equipment being ordered: Reclining manual w/c with bilateral elevating leg rests. 1 each 0     order for DME Equipment being ordered: Bilateral leg supports for manual wheelchair. 2 each 0     nitroglycerin (NITROSTAT) 0.4 MG SL tablet Place 1 tablet (0.4 mg) under the tongue every 5 minutes as needed for chest pain 30 tablet 1     blood glucose monitoring (NO BRAND SPECIFIED) lancets Use to test blood sugars 2 times daily or as directed. 1 Box 3     ORDER FOR DME Equipment being ordered: Lift chair.    Please see indications and face-to-face encounter details in 2/3/15 Office Visit note. 1 Device 0     ipratropium - albuterol 0.5 mg/2.5 mg/3 mL (DUONEB) 0.5-2.5 (3) MG/3ML neb solution Take 1 vial (3 mLs) by nebulization every 6 hours as needed for shortness of breath / dyspnea, wheezing or other (Patient not taking: Reported on 4/11/2018) 360 mL 11     guaiFENesin (MUCINEX) 600 MG 12 hr tablet Take 1 tablet (600 mg) by mouth 2 times daily as needed for congestion (Patient not taking:  Reported on 4/11/2018) 20 tablet 0     gabapentin (NEURONTIN) 100 MG capsule Take 1 capsule (100 mg) by mouth 2 times daily as needed 60 capsule 3     No facility-administered medications prior to visit.        ROS:   CONSTITUTIONAL: Denies fever, chills, fatigue, or weight fluctuations.   HEENT: Denies headache, vision changes, and changes in speech.   CV: Refer to HPI.   PULMONARY:Denies shortness of breath or previous TB exposure. Complains of productive cough.   GI:Denies nausea, vomiting, diarrhea, and abdominal pain. Last BM Monday. Complains of abdominal distention.   :Denies urinary alterations, dysuria, urinary frequency, hematuria, and abnormal drainage.   EXT: Complains of LE edema.   SKIN:Denies abnormal rashes or lesions. Denies concerns at LVAD drive line site.   MUSCULOSKELETAL: Denies upper or lower extremity weakness and pain.   NEUROLOGIC:Denies lightheadedness, dizziness, seizures, or upper or lower extremity paresthesia.     EXAM:  BP (!) 99/0  Pulse 56  Wt 81.6 kg (180 lb)  SpO2 100%  BMI 27.37 kg/m2  GENERAL: Appears alert and oriented times three.   HEENT: Eye symmetrical and free of discharge bilaterally. Mucous membranes moist and without lesions.  NECK: Supple and without lymphadenopathy. JVD 8-9 cm.   CV: S1S2 present with LVAD hum. Tenderness to palpation of right chest wall. Increased pain to chest noted with movement of right shoulder.   RESPIRATORY: Respirations regular, even, and unlabored. Lungs CTA throughout.   GI: Soft and distended with hypoactive bowel sounds present in all quadrants. No tenderness, rebound, guarding. No organomegaly.   EXTREMITIES: +1 bilateral LE edema. 2+ bilateral pedal pulses.   NEUROLOGIC: Alert and orientated x 3. CN II-XII grossly intact. No focal deficits.   MUSCULOSKELETAL: No joint swelling or tenderness.   SKIN: No jaundice. No rashes or lesions. LVAD drive line covered.   Labs:  CBC RESULTS:  Lab Results   Component Value Date    WBC 6.1  2018    RBC 4.18 (L) 2018    HGB 11.0 (L) 2018    HCT 36.5 (L) 2018    MCV 87 2018    MCH 26.3 (L) 2018    MCHC 30.1 (L) 2018    RDW 19.0 (H) 2018     2018       CMP RESULTS:  Lab Results   Component Value Date     2018    POTASSIUM 4.4 2018    CHLORIDE 110 (H) 2018    CO2 22 2018    ANIONGAP 9 2018     (H) 2018    BUN 22 2018    CR 1.88 (H) 2018    GFRESTIMATED 36 (L) 2018    GFRESTBLACK 44 (L) 2018    JOSÉ 8.6 2018    BILITOTAL 0.7 2018    ALBUMIN 3.3 (L) 2018    ALKPHOS 121 2018    ALT 18 2018    AST 54 (H) 2018        INR RESULTS:  Lab Results   Component Value Date    INR 1.8 (A) 2018    INR 2.7 2018       Lab Results   Component Value Date    MAG 2.1 2017     Lab Results   Component Value Date    NTBNPI 811 2017     Lab Results   Component Value Date    NTBNP 1687 (H) 2015       Diagnostics:  Recent Results (from the past 4320 hour(s))   ECHO LVAD Complete *    Narrative    [id number]  Formerly [facility]  [id number]  [id number]^HAN^RADHA^[facility],[place]  Echocardiography Laboratory  [address] [place] [id number]     Name: DANIEL RICHARD  MRN: [id number]  : 1950  Study Date: 2017 11:18 AM  Age: 67 yrs  Gender: Male  Patient [place]: [facility] [place]  Reason For Study: CHF  History: LVAD  Ordering Physician: RADHA SHORT  Performed By: Natividad Jorgensen RDCS     BSA: 2.0 m2  Height: 68 in  Weight: 185 lb  BP: 101/85 mmHg  _____________________________________________________________________________  __        Procedure  LVAD Echocardiogram with two-dimensional, color and spectral Doppler  performed.  _____________________________________________________________________________  __        Interpretation Summary  HM II LVAD Study at 8800 RPM. Technically difficult due to  extremely poor  acoustic windows.     Left ventricular wall thickness and size is normal (LVEDD = 5.6 cm; LVESD =  4.9 cm).  Visual estimate of LVEF is <30% which is severely reduced.  Inflow cannula visualized in LV with non-pulsatile flow by PW spectral Doppler  at velocities < 20 cm/s and no color flow demonstrated at a Nyquist limit of  60 cm/s. In one view there is a small echodensity which is probable artifact.  Clinical correlation required. Outflow cannula visualized in the aorta with  low pulsatile flow (PSV 74 cm/s, EDV 30 cm/s) on Spectral Doppler and color  flow demonstrated.  Septum midline with slight bowing towards LV in diastole.  Moderately reduced RV systolic function. Unable to assess RV size but on  limited views appears enlarged.  Aortic valve poorly visualized but appears closed with no Doppler color flow  across valve.  Mitral annular calcification and mitral valve thickening with no significant  dysfunction.  TV not visualized.  PA systolic pressure 24 mmHg plus RA pressure.  IVC not visualized.  No obvious pericardial effusion.        Compared to prior study (8/2017), biventricular function appears worsened on  some views and septum bows slightly to the left. Inflow velocities are less  pulsatile with slightly lower velocites.  _____________________________________________________________________________  __        Left Ventricle  Left ventricular wall thickness is normal. Left ventricular size is normal.  LVEDD = 5.6 cm; LVESD = 4.9 cm. Septum midline. Diastolic function not  assessed in setting of LVAD. Left ventricular wall thickness and size is  normal (LVEDD = 5.6 cm; LVESD = 4.9 cm).  Visual estimate of LVEF <30% which is severely reduced.  Inflow cannula visualized in LV with non-pulsatile flow by PW spectral Doppler  at velocities < 20 cm/s and no color flow demonstrated at a Nyquist limit of  60 cm/s. In one view there is a small echodensity which is probable artifact.  Clinical  correlation required. Outflow cannula visualized in the aorta with  low pulsatile flow (PSV 74 cm/s, EDV 30 cm/s) on Spectral Doppler and color  flow demonstrated.  Septum midline with slight bowing towards LV in diastole.     Right Ventricle  Severe right ventricular dilation is present. Global right ventricular  function is moderately reduced.     Atria  Moderate biatrial enlargement is present.        Mitral Valve  Mild to moderate mitral annular calcification is present.     Aortic Valve  Mild annular calcification and leaflet thickening. Aortic valve appears closed  on limited views.     Tricuspid Valve  The tricuspid valve is normal. Right ventricular systolic pressure is 24mmHg  above the right atrial pressure.     Pulmonic Valve  The pulmonic valve cannot be assessed.     Vessels  Ascending aorta appears normal. IVC not visualized.     Pericardium  No pericardial effusion is present.     _____________________________________________________________________________  __  MMode/2D Measurements & Calculations  IVSd: 0.85 cm  LVIDd: 5.6 cm  LVIDs: 4.9 cm  LVPWd: 0.85 cm     FS: 12.6 %  EDV(Teich): 154.9 ml  ESV(Teich): 113.4 ml  LV mass(C)d: 179.0 grams  LV mass(C)dI: 90.5 grams/m2  RWT: 0.30        Doppler Measurements & Calculations  MV E max trang: 60.1 cm/sec  MV A max trang: 37.8 cm/sec  MV E/A: 1.6  MV dec slope: 171.0 cm/sec2  TR max trang: 243.5 cm/sec  TR max P.8 mmHg  Medial E/e': 25.8     _____________________________________________________________________________  __           Report approved by: Kevin Dubon 2017 02:36 PM        EKG consistent with Vpaced at a rate of 63.    Assessment and Plan:   Mr. 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 He presents to clinic for routine follow up and will be transferred to [facility] for further evaluation.      Chronic systolic congestive heart failure secondary to ICM s/p LVAD HM II as DT. Complicated by hemolysis, thrombus, hematuria, and multiple CVA's.   Stage D, NYHA Class III  ACEi/ARB Losartan 12.5 mg po daily.   BB Toprol XL 50 mg in AM and 25 mg at HS.   Aldosterone antagonist No, Contraindicated given recent CKD.   SCD prophylaxis ICD.   Fluid status: Hypervolemic. Recommended he resume Lasix 20 mg po daily, inpatient team to further assess.   NSAID use: No.   MAP: 99. Note he did not take AM medications today prior to arrival in clinic.   LDH: Check on admission.   Anticoagulation: To be obtained inpatient.   Antiplatelet: ASA held due to hematuria.   He is not currently a candidate for pump exchange given multiple CVA's. Palliative care deferred per family request for cultural rationale.   - Given he is a difficult lab draw and plan for admission he will obtain further labs on admission to [facility]. He will be transferred for Heparin gtt given concern for worsening thrombus for 3-5 days with reassessment at that time.       HTN.   - Losartan 12.5 mg po daily.       CAD and Hyperlipidemia.   - Lipitor discontinued to minimize meds per family's request.  - ASA held due to hematuria.   - Continue Toprol XL.       CKD, Stage III.   - Labs to be obtained on admission to [facility].     Cough. Note recent CAP with course of antibiotics.   - Chest X-ray per inpatient team.     Abdominal Distention. 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 Last  BM Monday.   - Abd X-ray per inpatient team.     Follow up pending hospitalization. Report called to inpatient team, Dr. Bernardino goff MD. Report called to ED as well.        Zuleika Patterson  4/10/2018          CC  ADAM CLEMENTE

## 2018-04-11 NOTE — PHARMACY-ADMISSION MEDICATION HISTORY
Admission medication history interview status for the 4/11/2018 admission is complete. See Epic admission navigator for allergy information, pharmacy, prior to admission medications and immunization status.     Medication history interview sources:  Patient's daughter and [facility] [phone]    Changes made to PTA medication list (reason)  Added: None  Deleted: Calcium/Vitamin D (patient no longer taking), gabapentin (patient hasn't used for a long time, hasn't needed), guaifenesin (used for a cough, no longer using), ranitidine (using pantoprazole instead)   Changed:    Melatonin 3 mg --> 5 mg (per daughter)    Warfarin: updated to reflect current dosing   Ketotifen BID to once daily (per daughter)     Additional medication history information (including reliability of information, actions taken by pharmacist):   1. Patient's daughter was a reliable historian. Knew medication names, doses, and frequency of use.   2. PRNs:    Acetaminophen: patient uses 2 tablets as needed, usually every day to every other day    Bisacodyl: patient uses 1-2 times per week    Furosemide: patient uses if his stomach feels bloated or feels as if he is carrying too much fluid    Duoneb: patient has not receive the nebulizer yet and therefore has not been able to use Duonebs    Senna/docusate: patient uses 1 tablet BID   3. Patient's daughter confirmed allergies and reactions. They have no additional allergies to report.    4. Patient has received the influenza vaccine for the current flu season.   5. Patient has not taken any of his morning medications today.     Prior to Admission medications    Medication Sig Last Dose Taking? Auth Provider   MELATONIN PO Take 5 mg by mouth At Bedtime 4/10/2018 Yes Unknown, Entered By History   WARFARIN SODIUM PO Take by mouth daily Take as directed by anticoagulation clinic. No maintenance plan.  4/5-4/8: 3 mg, 4/9 & 4/10: 4.5 mg  Yes Unknown, Entered By History   losartan (COZAAR) 25 MG  tablet Take 0.5 tablets (12.5 mg) by mouth daily 4/10/2018 Yes Zuleika Patterson APRN CNP   tamsulosin (FLOMAX) 0.4 MG capsule Take 2 capsules (0.8 mg) by mouth daily 4/10/2018 Yes Raghu Almodovar MD   PANTOPRAZOLE SODIUM PO Take 20 mg by mouth every morning (before breakfast) 4/10/2018 Yes Reported, Patient   acetaminophen (TYLENOL) 325 MG tablet Take 2 tablets (650 mg) by mouth every 6 hours as needed for mild pain Past Week Yes    nystatin (MYCOSTATIN) 863293 UNIT/GM POWD Apply to affected areas of skin in the groin and on the scrotum three times a day as needed. Past Month Yes Thomas Dill MD   loratadine (CLARITIN) 10 MG tablet TAKE 1 TABLET (10 MG) BY MOUTH DAILY 4/10/2018 Yes Thomas Dill MD   Metoprolol Succinate (TOPROL XL PO) Take 50mg by mouth every morning. Take 25mg by mouth every evening. 4/10/2018 Yes Unknown, Entered By History   levETIRAcetam (KEPPRA) 750 MG tablet TAKE 1 TABLET (750 MG) BY MOUTH 2 TIMES DAILY 4/10/2018 Yes Thomas Dill MD   furosemide (LASIX) 20 MG tablet Take 1 tablet (20 mg) by mouth as needed Past Week Yes Vicky Ziegler, NP   ketotifen (ZADITOR/REFRESH ANTI-ITCH) 0.025 % SOLN ophthalmic solution Place 1 drop into both eyes 2 times daily  Patient taking differently: Place 1 drop into both eyes daily  4/10/2018 Yes Delgado Agosto MD   SENEXON-S 8.6-50 MG per tablet TAKE 1-2 TABLETS BY MOUTH 2 TIMES DAILY AS NEEDED FOR CONSTIPATION 4/10/2018 Yes Thomas Dill MD   bisacodyl (DULCOLAX) 10 MG Suppository Place 1 suppository (10 mg) rectally daily as needed for constipation Past Week Yes Gabe Peters MD   finasteride (PROSCAR) 5 MG tablet Take 1 tablet (5 mg) by mouth daily 4/10/2018 Yes Lenore Gardner PA   simethicone (MYLICON) 80 MG chewable tablet Take 1 tablet (80 mg) by mouth 4 times daily Past Week Yes Jess Carter APRN CNP   oxyCODONE (ROXICODONE) 5 MG IR tablet Take 1 tablet (5 mg) by mouth every  4 hours as needed for moderate to severe pain Past Week Yes Jess Carter APRN CNP   Thiamine HCl (VITAMIN B-1) 100 MG TABS TAKE 1 TABLET (100 MG) BY MOUTH DAILY 4/10/2018 Yes Thomas Dill MD   order for DME Equipment being ordered: Nebulizer   Shilpi Agosto APRN CNP   ipratropium - albuterol 0.5 mg/2.5 mg/3 mL (DUONEB) 0.5-2.5 (3) MG/3ML neb solution Take 1 vial (3 mLs) by nebulization every 6 hours as needed for shortness of breath / dyspnea, wheezing or other Does not have nebulizer yet.  Shilpi Agosto APRN CNP   blood glucose monitoring (ALEK MICROLET) lancets USE TO TEST BLOOD SUGAR 2 TIMES DAILY OR AS DIRECTED   Thomas Dill MD   ALEK CONTOUR test strip USE TO TEST BLOOD SUGAR 2 TIMES DAILY OR AS DIRECTED.   Thomas Dill MD   order for DME Bilateral heel protective cushioning boots.  Dx: previous stroke with left hemiplegia.  Duration of need: 99 months.   Thomas Dill MD   order for DME Equipment being ordered: Cushioned heel boots.   Thomas Dill MD   order for DME Equipment being ordered: Wheelchair, manual, with elevated leg rests and tilt in space back.  Please fax to [facility]; I called them 11/26/16 and they requested the new order.  Face to face is in my 10/26/16 note.   Thomas Dill MD   order for DME Equipment being ordered: Wheelchair, manual.   Thomas Dill MD   order for DME Equipment being ordered: Hospital Bed, fully electric.   Thomas Dill MD   order for DME Equipment being ordered: Reclining manual w/c with bilateral elevating leg rests.   Thomas Dill MD   order for DME Equipment being ordered: Bilateral leg supports for manual wheelchair.   Thomas Dill MD   nitroglycerin (NITROSTAT) 0.4 MG SL tablet Place 1 tablet (0.4 mg) under the tongue every 5 minutes as needed for chest pain   Jess Carter APRN CNP   blood glucose monitoring (NO BRAND SPECIFIED) lancets Use to test blood  sugars 2 times daily or as directed.   Thomas Dill MD   ORDER FOR DME Equipment being ordered: Lift chair.    Please see indications and face-to-face encounter details in 2/3/15 Office Visit note.   Thomas Dill MD       Medication history completed by: Ying He, LatoyaD

## 2018-04-11 NOTE — ED PROVIDER NOTES
History     Chief Complaint   Patient presents with     Chest Pain     HPI  Sohan Rendon is a 68 year old male with an LVAD.  He was seen in the clinic today and it was their desire to have the patient admitted, unfortunately there was no bed available, and so he was sent to the emergency department to wait for his bed.  They are concerned that he has a thrombus in the LVAD and would like him started on a high intensity heparin without a loading dose.  They would like the usual labs ordered and the patient will sit here waiting for an inpatient bed.       This part of the document was transcribed by Lisset Alexandre, Medical Scribe.   I have reviewed the Medications, Allergies, Past Medical and Surgical History, and Social History in the [employer] system.  Past Medical History:   Diagnosis Date     Acute right MCA stroke (H) 6/2013    With L sided hemiparesis      Anemia of chronic disease     Baseline Hb 8-9     BPH (benign prostatic hyperplasia)      CHF (congestive heart failure), NYHA class IV (H) 10/9/2012    s/p HeartMate II.  Was on milrinone and dobutamine prior to LVAD      CKD (chronic kidney disease)     Baseline Cr=     Clostridium difficile colitis 12/2012      Dysphagia     PEG tube placed in 2012.  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  Normal carotid duplex 3/2014.       Esophagitis 12/2012    EGD with esophagitis and multiple douenal ulcers     Fracture of femoral neck, right, closed (H) 2/3/2015    Presumed pathologic as patient is non-weight-bearing and suffered no trauma.  Family declined operative repair during hospital stay 1/23 - 1/30/15.     Gastric and duodenal angiodysplasia with hemorrhage 6/18/2015     GERD (gastroesophageal reflux disease)      Gout      Hematuria      HTN (hypertension)     LDL=59 (3/29/2014)     Hyperlipaemia      Ischemic cardiomyopathy      Mitral regurgitation     s/p  MVR with Carbomedics ring      Myocardial infarction 1998    [facility] without intervention      Nonsenile cataract     BE     PFO (patent foramen ovale)     s/p closure (10/9/2012)     TB lung, latent     s/p 9 months INH in 2012        Past Surgical History:   Procedure Laterality Date     C CABG, VEIN, FIVE  2001     CATARACT IOL, RT/LT Right 11/19/2015     ENDOSCOPIC RETROGRADE CHOLANGIOPANCREATOGRAM N/A 5/9/2017    Procedure: COMBINED ENDOSCOPIC RETROGRADE CHOLANGIOPANCREATOGRAPHY, PLACE TUBE/STENT;  Endoscopic Retrograde Cholangiopancreatogram, Stent (Zimmon Biliary 7fr 12cm) Placement;  Surgeon: Cristo Grove MD;  Location: [facility] OR     ESOPHAGOSCOPY, GASTROSCOPY, DUODENOSCOPY (EGD), COMBINED N/A 6/10/2015    Procedure: COMBINED ESOPHAGOSCOPY, GASTROSCOPY, DUODENOSCOPY (EGD);  Surgeon: Edu Jenkins MD;  Location: [facility] GI     ESOPHAGOSCOPY, GASTROSCOPY, DUODENOSCOPY (EGD), COMBINED N/A 6/15/2015    Procedure: COMBINED ESOPHAGOSCOPY, GASTROSCOPY, DUODENOSCOPY (EGD);  Surgeon: Yury Bonner MD;  Location: [facility] OR     H INSERT ICD  2/10/2011    And pacemaker.  Not BiV     INSERT VENTRICULAR ASSIST DEVICE LEFT (HEARTMATE II)  10/9/2012     IR GASTRO JEJUNOSTOMY TUBE PLACEMENT       PHACOEMULSIFICATION CLEAR CORNEA WITH STANDARD INTRAOCULAR LENS IMPLANT Right 11/19/2015    Procedure: PHACOEMULSIFICATION CLEAR CORNEA WITH STANDARD INTRAOCULAR LENS IMPLANT;  Surgeon: Violeta Cosme MD;  Location: [facility] OR     REPAIR PATENT FORAMEN OVALE  10/9/2012     REPAIR VALVE MITRAL  2/9/2012    28 mm Carbomedics 2/3 ring        Family History   Problem Relation Age of Onset     Family History Negative No family hx of      Glaucoma No family hx of      Macular Degeneration No family hx of      CANCER No family hx of      no skin cancer       Social History   Substance Use Topics     Smoking status: Former Smoker     Smokeless tobacco: Former User     Alcohol use No       Current Facility-Administered Medications  "  Medication     heparin  drip 25,000 units in 0.45% NaCl 250 mL (see additional administration details for dose)     heparin bolus from infusion pump     Current Outpatient Prescriptions   Medication     losartan (COZAAR) 25 MG tablet     order for DME     ipratropium - albuterol 0.5 mg/2.5 mg/3 mL (DUONEB) 0.5-2.5 (3) MG/3ML neb solution     guaiFENesin (MUCINEX) 600 MG 12 hr tablet     tamsulosin (FLOMAX) 0.4 MG capsule     PANTOPRAZOLE SODIUM PO     blood glucose monitoring (ALEK MICROLET) lancets     acetaminophen (TYLENOL) 325 MG tablet     gabapentin (NEURONTIN) 100 MG capsule     ranitidine (ZANTAC) 75 MG tablet     warfarin (COUMADIN) 3 MG tablet     ALEK CONTOUR test strip     nystatin (MYCOSTATIN) 053096 UNIT/GM POWD     loratadine (CLARITIN) 10 MG tablet     order for DME     Metoprolol Succinate (TOPROL XL PO)     levETIRAcetam (KEPPRA) 750 MG tablet     furosemide (LASIX) 20 MG tablet     ketotifen (ZADITOR/REFRESH ANTI-ITCH) 0.025 % SOLN ophthalmic solution     SENEXON-S 8.6-50 MG per tablet     bisacodyl (DULCOLAX) 10 MG Suppository     finasteride (PROSCAR) 5 MG tablet     calcium carbonate-vitamin D 500-400 MG-UNIT TABS per tablet     simethicone (MYLICON) 80 MG chewable tablet     oxyCODONE (ROXICODONE) 5 MG IR tablet     melatonin 3 MG tablet     Thiamine HCl (VITAMIN B-1) 100 MG TABS     order for DME     order for DME     order for DME     order for DME     order for DME     order for DME     nitroglycerin (NITROSTAT) 0.4 MG SL tablet     blood glucose monitoring (NO BRAND SPECIFIED) lancets     ORDER FOR DME        Allergies   Allergen Reactions     Seasonal Allergies        Review of Systems   Constitutional: Negative for fever.   Respiratory: Negative for shortness of breath.    Cardiovascular: Positive for chest pain.   All other systems reviewed and are negative.      Physical Exam   BP: 110/90  Pulse: 61  Temp: 97.8  F (36.6  C)  Resp: 16  Height: 172.7 cm (5' 8\")  SpO2: 99 " %      Physical Exam   Constitutional: He is oriented to person, place, and time. No distress.   Eyes: Pupils are equal, round, and reactive to light.   Cardiovascular:   LVAD whirr   Pulmonary/Chest: Effort normal and breath sounds normal.   Abdominal: Soft. Bowel sounds are normal.   Neurological: He is alert and oriented to person, place, and time.   Nursing note and vitals reviewed.      ED Course     ED Course     Procedures        Medications   heparin  drip 25,000 units in 0.45% NaCl 250 mL (see additional administration details for dose) (1,450 Units/hr Intravenous New Bag 4/11/18 1142)   heparin bolus from infusion pump (not administered)            Labs Ordered and Resulted from Time of ED Arrival Up to the Time of Departure from the ED   CBC WITH PLATELETS DIFFERENTIAL - Abnormal; Notable for the following:        Result Value    RBC Count 3.89 (*)     Hemoglobin 10.4 (*)     Hematocrit 34.6 (*)     MCHC 30.1 (*)     RDW 20.6 (*)     All other components within normal limits   COMPREHENSIVE METABOLIC PANEL - Abnormal; Notable for the following:     Chloride 111 (*)     Glucose 114 (*)     Creatinine 2.00 (*)     GFR Estimate 33 (*)     GFR Estimate If Black 41 (*)     Calcium 8.4 (*)     Albumin 3.2 (*)     All other components within normal limits   INR - Abnormal; Notable for the following:     INR 2.04 (*)     All other components within normal limits   LACTATE DEHYDROGENASE   PLATELETS MONITORED PER HEPARIN TREATMENT PROTOCOL (FOR MEANINGFUL USE            Assessments & Plan (with Medical Decision Making)   68-year-old male with LVAD waiting in the emergency department to be admitted to the cardiology to service.  He was seen in the clinic today and a bed was ordered, however, because of lack of bed availability he was transferred here.  They have requested that he be started on heparin high intensity with no loading dose.  This was done.  Also the typical routine labs were ordered.  When a bed is  available he will go up to his unit.    I have reviewed the nursing notes.    I have reviewed the findings, diagnosis, plan and need for follow up with the patient.  This part of the document was transcribed by Alesia Rodríguez Scribe.   Current Discharge Medication List          Final diagnoses:   Complication involving left ventricular assist device (LVAD), initial encounter       4/11/2018   [facility], [place], EMERGENCY DEPARTMENT     Harinder Tellez MD  04/11/18 1140

## 2018-04-11 NOTE — MR AVS SNAPSHOT
After Visit Summary   4/11/2018    Sohan Rendon    MRN: [id number]           Patient Information     Date Of Birth          1950        Visit Information        Provider Department      4/11/2018 8:45 AM Zuleika Patterson APRN CNP; [employer] [facility]        Today's Diagnoses     Acute systolic congestive heart failure, NYHA class 4 (H)    -  1    Chest pain, unspecified type           Follow-ups after your visit        Your next 10 appointments already scheduled     May 04, 2018 11:00 AM CDT   Home Follow Up with Raghu Almodovar MD   [facility] ([facility])    [address]  [address]  [place] [address] [phone]              Future tests that were ordered for you today     Open Standing Orders        Priority Remaining Interval Expires Ordered    Notify Provider STAT [id number] PRN  4/11/2018    Notify Provider STAT [id number] PRN  4/11/2018    CBC with platelets STAT 5/5 EVERY THREE DAYS  4/11/2018    Heparin Xa (10a) Level STAT 1/1 CONDITIONAL X 1 STAT  4/11/2018    Heparin Xa (10a) Level Routine 16/16 DAILY  4/11/2018    Heparin Xa (10a) Level Routine 100/100 CONDITIONAL (SPECIFY)  4/11/2018          Open Future Orders        Priority Expected Expires Ordered    Comprehensive metabolic panel Routine 4/11/2018 4/12/2018 4/11/2018    CBC with platelets Routine 4/11/2018 4/12/2018 4/11/2018    INR Routine 4/11/2018 4/12/2018 4/11/2018    Lactate Dehydrogenase Routine 4/11/2018 4/12/2018 4/11/2018            Who to contact     If you have questions or need follow up information about today's clinic visit or your schedule please contact [facility] directly at [phone].  Normal or non-critical lab and imaging results will be communicated to you by MyChart, letter or phone within 4 business days after the clinic has received the results. If you do not hear from us within 7 days, please contact the clinic through  "[employer] or phone. If you have a critical or abnormal lab result, we will notify you by phone as soon as possible.  Submit refill requests through [employer] or call your pharmacy and they will forward the refill request to us. Please allow 3 business days for your refill to be completed.          Additional Information About Your Visit        [employer] Information     [employer] lets you send messages to your doctor, view your test results, renew your prescriptions, schedule appointments and more. To sign up, go to [email or web address] . Click on \"Log in\" on the left side of the screen, which will take you to the Welcome page. Then click on \"Sign up Now\" on the right side of the page.     You will be asked to enter the access code listed below, as well as some personal information. Please follow the directions to create your username and password.     Your access code is: [email or web address]  Expires: 2018  3:27 PM     Your access code will  in 90 days. If you need help or a new code, please call your [place] clinic or [phone].        Care EveryWhere ID     This is your Care EveryWhere ID. This could be used by other organizations to access your [facility] medical records  [id number]        Your Vitals Were     Pulse Pulse Oximetry BMI (Body Mass Index)             56 100% 27.37 kg/m2          Blood Pressure from Last 3 Encounters:   18 102/87   18 (!) 99/0   18 95/79    Weight from Last 3 Encounters:   18 81.6 kg (180 lb)   18 82.6 kg (182 lb 1.6 oz)   17 84.2 kg (185 lb 10 oz)              We Performed the Following     (22711) INTERROGATE VENT ASSIST DEVICE, IN PERSON, W MD ANALYSIS PARAMETERS     EKG 12-lead complete w/read - Clinics     Glucose by meter     Glucose Whole Blood POCT          Today's Medication Changes          These changes are accurate as of 18  1:32 PM.  If you have any questions, ask your nurse or doctor.               These medicines have changed " or have updated prescriptions.        Dose/Directions    ketotifen 0.025 % Soln ophthalmic solution   Commonly known as:  ZADITOR/REFRESH ANTI-ITCH   This may have changed:  when to take this   Used for:  Allergic conjunctivitis, bilateral        Dose:  1 drop   Place 1 drop into both eyes 2 times daily   Quantity:  1 Bottle   Refills:  11                Primary Care Provider Office Phone # Fax #    CLAIRE Rosas [phone]       [address] [address]        Equal Access to Services     [facility]ENDA : Hadii aad ku hadasho Soomaali, waaxda luqadaha, qaybta kaalmada adeegyada, waxay idiin hayaan adeeg khkemal dorman . So [facility] [phone].    ATENCIÓN: Si habla español, tiene a smith disposición servicios gratuitos de asistencia lingüística. [facility] [phone].    We comply with applicable federal civil rights laws and [place] laws. We do not discriminate on the basis of race, color, national origin, age, disability, sex, sexual orientation, or gender identity.            Thank you!     Thank you for choosing [facility]  for your care. Our goal is always to provide you with excellent care. Hearing back from our patients is one way we can continue to improve our services. Please take a few minutes to complete the written survey that you may receive in the mail after your visit with us. Thank you!             Your Updated Medication List - Protect others around you: Learn how to safely use, store and throw away your medicines at www.disposemymeds.org.          This list is accurate as of 4/11/18  1:32 PM.  Always use your most recent med list.                   Brand Name Dispense Instructions for use Diagnosis    acetaminophen 325 MG tablet    TYLENOL    100 tablet    Take 2 tablets (650 mg) by mouth every 6 hours as needed for mild pain        ALEK CONTOUR test strip   Generic drug:  blood glucose monitoring     100 strip    USE TO TEST BLOOD SUGAR 2 TIMES  DAILY OR AS DIRECTED.    Hypoglycemia       bisacodyl 10 MG Suppository    DULCOLAX    5 suppository    Place 1 suppository (10 mg) rectally daily as needed for constipation    Drug-induced constipation       * blood glucose monitoring lancets     1 Box    Use to test blood sugars 2 times daily or as directed.    Diabetes mellitus, type 2 (H)       * blood glucose monitoring lancets     100 each    USE TO TEST BLOOD SUGAR 2 TIMES DAILY OR AS DIRECTED    Diabetes mellitus, type 2 (H)       finasteride 5 MG tablet    PROSCAR    90 tablet    Take 1 tablet (5 mg) by mouth daily    Incomplete bladder emptying       furosemide 20 MG tablet    LASIX    20 tablet    Take 1 tablet (20 mg) by mouth as needed    Chronic systolic congestive heart failure (H)       ipratropium - albuterol 0.5 mg/2.5 mg/3 mL 0.5-2.5 (3) MG/3ML neb solution    DUONEB    360 mL    Take 1 vial (3 mLs) by nebulization every 6 hours as needed for shortness of breath / dyspnea, wheezing or other    Pulmonary atelectasis       ketotifen 0.025 % Soln ophthalmic solution    ZADITOR/REFRESH ANTI-ITCH    1 Bottle    Place 1 drop into both eyes 2 times daily    Allergic conjunctivitis, bilateral       levETIRAcetam 750 MG tablet    KEPPRA    180 tablet    TAKE 1 TABLET (750 MG) BY MOUTH 2 TIMES DAILY    Convulsions, unspecified convulsion type (H)       loratadine 10 MG tablet    CLARITIN    90 tablet    TAKE 1 TABLET (10 MG) BY MOUTH DAILY    Allergic rhinitis       losartan 25 MG tablet    COZAAR    15 tablet    Take 0.5 tablets (12.5 mg) by mouth daily    Chronic systolic congestive heart failure (H)       MELATONIN PO      Take 5 mg by mouth At Bedtime        nitroGLYcerin 0.4 MG sublingual tablet    NITROSTAT    30 tablet    Place 1 tablet (0.4 mg) under the tongue every 5 minutes as needed for chest pain    Coronary artery disease involving native coronary artery with unstable angina pectoris (H)       nystatin 081441 UNIT/GM Powd    MYCOSTATIN    60 g     Apply to affected areas of skin in the groin and on the scrotum three times a day as needed.    Intertriginous dermatitis associated with moisture       order for DME     1 Device    Equipment being ordered: Lift chair.  Please see indications and face-to-face encounter details in 2/3/15 Office Visit note.    Fracture of femoral neck, right, closed, initial encounter, Stroke (H), CHF (congestive heart failure), NYHA class IV (H)       * order for DME     2 each    Equipment being ordered: Bilateral leg supports for manual wheelchair.    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  Please fax to [employer]; I called them 11/26/16 and they requested the new order.  Face to face is in my 10/26/16 note.    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  Dx: previous stroke with left hemiplegia.  Duration of need: 99 months.    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, subsequent encounter       PANTOPRAZOLE SODIUM PO      Take 20 mg by mouth every morning (before breakfast)        SENEXON-S 8.6-50 MG per tablet   Generic drug:  senna-docusate     60 tablet    TAKE 1-2 TABLETS BY MOUTH 2 TIMES DAILY AS NEEDED FOR CONSTIPATION    Slow transit constipation       simethicone 80 MG chewable tablet    MYLICON    180 tablet    Take 1 tablet (80 mg) by mouth 4 times daily    Slow transit constipation       tamsulosin 0.4 MG capsule    FLOMAX    60 capsule    Take 2 capsules (0.8 mg) by mouth daily    Incomplete bladder emptying       thiamine 100 MG tablet     90 tablet    TAKE 1 TABLET (100 MG) BY MOUTH DAILY    Cerebrovascular accident (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 Take 25mg by mouth every evening.        WARFARIN SODIUM PO      Take by mouth daily Take as directed by anticoagulation clinic. No maintenance plan. 4/5-4/8: 3 mg, 4/9 & 4/10: 4.5 mg        * Notice:  This list has 10 medication(s) that are the same as other medications prescribed for you. Read the directions carefully, and ask your doctor or other care provider to review them with you.

## 2018-04-11 NOTE — LETTER
4/11/2018      RE: Sohan Rendon  [address]   [facility] [id number]       Dear Colleague,    Thank you for the opportunity to participate in the care of your patient, Sohan Rendon, at the [facility] at [facility]. Please see a copy of my visit note below.    HPI:   Mr. 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 He presents to clinic for routine follow up. He was evaluated in ED on 3/18/18 for constipation, which was resolved prior to discharge.     He complains of right sided chest pain that started about 1 hour ago when getting onto the stretcher. He notes the pain to be worse with movement of his right arm. He He denies lightheadedness, dizziness, palpitations, SOB, diaphoresis, or nausea associated with chest pain. He does not note anything to worsen or improve the pain. He has not attempted any medication prior to presentation. He notes persistent abdominal distention with last BM Monday. He notes persistent productive cough, but feels this is slightly improved. He denies HOOK, PND, orthopnea, nausea, vomiting, diarrhea, hematochezia, and melena. They are not able to weigh him at home given he is bed bound. He continues to follow a low sodium diet.        VAD Interrogation on 4/11/2018: VAD interrogation reviewed with VAD coordinator. Agree with findings. Power spikes up to 15.6 with speed drops concerning for worsening thrombus.     PAST MEDICAL HISTORY:  Past Medical  History:   Diagnosis Date     Acute right MCA stroke (H) 6/2013    With L sided hemiparesis      Anemia of chronic disease     Baseline Hb 8-9     BPH (benign prostatic hyperplasia)      CHF (congestive heart failure), NYHA class IV (H) 10/9/2012    s/p HeartMate II.  Was on milrinone and dobutamine prior to LVAD      CKD (chronic kidney disease)     Baseline Cr=     Clostridium difficile colitis 12/2012      Dysphagia     PEG tube placed in 2012.  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  Normal carotid duplex 3/2014.       Esophagitis 12/2012    EGD with esophagitis and multiple douenal ulcers     Fracture of femoral neck, right, closed (H) 2/3/2015    Presumed pathologic as patient is non-weight-bearing and suffered no trauma.  Family declined operative repair during hospital stay 1/23 - 1/30/15.     Gastric and duodenal angiodysplasia with hemorrhage 6/18/2015     GERD (gastroesophageal reflux disease)      Gout      Hematuria      HTN (hypertension)     LDL=59 (3/29/2014)     Hyperlipaemia      Ischemic cardiomyopathy      Mitral regurgitation     s/p MVR with Carbomedics ring      Myocardial infarction 1998    In [facility] without intervention      Nonsenile cataract     BE     PFO (patent foramen ovale)     s/p closure (10/9/2012)     TB lung, latent     s/p 9 months INH in 2012        FAMILY HISTORY:  Family History   Problem Relation Age of Onset     Family History Negative No family hx of      Glaucoma No family hx of      Macular Degeneration No family hx of      CANCER No family hx of      no skin cancer       SOCIAL HISTORY:  Social History     Social History     Marital status:      Spouse name: N/A     Number of children: N/A     Years of education: N/A     Social History Main Topics     Smoking status: Former Smoker     Smokeless tobacco: Former User     Alcohol use No     Drug use: No     Sexual  activity: Not on file     Other Topics Concern     Not on file     Social History Narrative       CURRENT MEDICATIONS:  Outpatient Medications Prior to Visit   Medication Sig Dispense Refill     losartan (COZAAR) 25 MG tablet Take 0.5 tablets (12.5 mg) by mouth daily 15 tablet 3     order for DME Equipment being ordered: Nebulizer 1 each 11     tamsulosin (FLOMAX) 0.4 MG capsule Take 2 capsules (0.8 mg) by mouth daily 60 capsule 11     PANTOPRAZOLE SODIUM PO Take 20 mg by mouth every morning (before breakfast)       blood glucose monitoring (ALEK MICROLET) lancets USE TO TEST BLOOD SUGAR 2 TIMES DAILY OR AS DIRECTED 100 each 2     acetaminophen (TYLENOL) 325 MG tablet Take 2 tablets (650 mg) by mouth every 6 hours as needed for mild pain 100 tablet 0     ranitidine (ZANTAC) 75 MG tablet Take 1 tablet (75 mg) by mouth At Bedtime 30 tablet 3     warfarin (COUMADIN) 3 MG tablet Take 4.5 mg by mouth daily       ALEK CONTOUR test strip USE TO TEST BLOOD SUGAR 2 TIMES DAILY OR AS DIRECTED. 100 strip 2     nystatin (MYCOSTATIN) 198141 UNIT/GM POWD Apply to affected areas of skin in the groin and on the scrotum three times a day as needed. 60 g 3     loratadine (CLARITIN) 10 MG tablet TAKE 1 TABLET (10 MG) BY MOUTH DAILY 90 tablet 2     order for DME Bilateral heel protective cushioning boots.  Dx: previous stroke with left hemiplegia.  Duration of need: 99 months. 2 each 0     Metoprolol Succinate (TOPROL XL PO) Take 50mg by mouth every morning. Take 25mg by mouth every evening.       levETIRAcetam (KEPPRA) 750 MG tablet TAKE 1 TABLET (750 MG) BY MOUTH 2 TIMES DAILY 180 tablet 3     furosemide (LASIX) 20 MG tablet Take 1 tablet (20 mg) by mouth as needed 20 tablet 11     ketotifen (ZADITOR/REFRESH ANTI-ITCH) 0.025 % SOLN ophthalmic solution Place 1 drop into both eyes 2 times daily 1 Bottle 11     SENEXON-S 8.6-50 MG per tablet TAKE 1-2 TABLETS BY MOUTH 2 TIMES DAILY AS NEEDED FOR CONSTIPATION 60 tablet 3     bisacodyl  (DULCOLAX) 10 MG Suppository Place 1 suppository (10 mg) rectally daily as needed for constipation 5 suppository 1     finasteride (PROSCAR) 5 MG tablet Take 1 tablet (5 mg) by mouth daily 90 tablet 3     calcium carbonate-vitamin D 500-400 MG-UNIT TABS per tablet Take 1 tablet by mouth daily 90 tablet 3     simethicone (MYLICON) 80 MG chewable tablet Take 1 tablet (80 mg) by mouth 4 times daily (Patient taking differently: Take 80 mg by mouth every 6 hours as needed ) 180 tablet 5     oxyCODONE (ROXICODONE) 5 MG IR tablet Take 1 tablet (5 mg) by mouth every 4 hours as needed for moderate to severe pain 30 tablet 0     melatonin 3 MG tablet Take 1 tablet (3 mg) by mouth At Bedtime       Thiamine HCl (VITAMIN B-1) 100 MG TABS TAKE 1 TABLET (100 MG) BY MOUTH DAILY 90 tablet 3     order for DME Equipment being ordered: Cushioned heel boots. 2 each 0     order for DME Equipment being ordered: Wheelchair, manual, with elevated leg rests and tilt in space back.  Please fax to [employer]; I called them 11/26/16 and they requested the new order.  Face to face is in my 10/26/16 note. 1 each 0     order for DME Equipment being ordered: Wheelchair, manual. 1 each 0     order for DME Equipment being ordered: Hospital Bed, fully electric. 1 each 0     order for DME Equipment being ordered: Reclining manual w/c with bilateral elevating leg rests. 1 each 0     order for DME Equipment being ordered: Bilateral leg supports for manual wheelchair. 2 each 0     nitroglycerin (NITROSTAT) 0.4 MG SL tablet Place 1 tablet (0.4 mg) under the tongue every 5 minutes as needed for chest pain 30 tablet 1     blood glucose monitoring (NO BRAND SPECIFIED) lancets Use to test blood sugars 2 times daily or as directed. 1 Box 3     ORDER FOR DME Equipment being ordered: Lift chair.    Please see indications and face-to-face encounter details in 2/3/15 Office Visit note. 1 Device 0     ipratropium - albuterol 0.5 mg/2.5 mg/3 mL (DUONEB) 0.5-2.5 (3)  MG/3ML neb solution Take 1 vial (3 mLs) by nebulization every 6 hours as needed for shortness of breath / dyspnea, wheezing or other (Patient not taking: Reported on 4/11/2018) 360 mL 11     guaiFENesin (MUCINEX) 600 MG 12 hr tablet Take 1 tablet (600 mg) by mouth 2 times daily as needed for congestion (Patient not taking: Reported on 4/11/2018) 20 tablet 0     gabapentin (NEURONTIN) 100 MG capsule Take 1 capsule (100 mg) by mouth 2 times daily as needed 60 capsule 3     No facility-administered medications prior to visit.        ROS:   CONSTITUTIONAL: Denies fever, chills, fatigue, or weight fluctuations.   HEENT: Denies headache, vision changes, and changes in speech.   CV: Refer to HPI.   PULMONARY:Denies shortness of breath or previous TB exposure. Complains of productive cough.   GI:Denies nausea, vomiting, diarrhea, and abdominal pain. Last BM Monday. Complains of abdominal distention.   :Denies urinary alterations, dysuria, urinary frequency, hematuria, and abnormal drainage.   EXT: Complains of LE edema.   SKIN:Denies abnormal rashes or lesions. Denies concerns at LVAD drive line site.   MUSCULOSKELETAL: Denies upper or lower extremity weakness and pain.   NEUROLOGIC:Denies lightheadedness, dizziness, seizures, or upper or lower extremity paresthesia.     EXAM:  BP (!) 99/0  Pulse 56  Wt 81.6 kg (180 lb)  SpO2 100%  BMI 27.37 kg/m2  GENERAL: Appears alert and oriented times three.   HEENT: Eye symmetrical and free of discharge bilaterally. Mucous membranes moist and without lesions.  NECK: Supple and without lymphadenopathy. JVD 8-9 cm.   CV: S1S2 present with LVAD hum. Tenderness to palpation of right chest wall. Increased pain to chest noted with movement of right shoulder.   RESPIRATORY: Respirations regular, even, and unlabored. Lungs CTA throughout.   GI: Soft and distended with hypoactive bowel sounds present in all quadrants. No tenderness, rebound, guarding. No organomegaly.   EXTREMITIES: +1  bilateral LE edema. 2+ bilateral pedal pulses.   NEUROLOGIC: Alert and orientated x 3. CN II-XII grossly intact. No focal deficits.   MUSCULOSKELETAL: No joint swelling or tenderness.   SKIN: No jaundice. No rashes or lesions. LVAD drive line covered.   Labs:  CBC RESULTS:  Lab Results   Component Value Date    WBC 6.1 2018    RBC 4.18 (L) 2018    HGB 11.0 (L) 2018    HCT 36.5 (L) 2018    MCV 87 2018    MCH 26.3 (L) 2018    MCHC 30.1 (L) 2018    RDW 19.0 (H) 2018     2018       CMP RESULTS:  Lab Results   Component Value Date     2018    POTASSIUM 4.4 2018    CHLORIDE 110 (H) 2018    CO2 22 2018    ANIONGAP 9 2018     (H) 2018    BUN 22 2018    CR 1.88 (H) 2018    GFRESTIMATED 36 (L) 2018    GFRESTBLACK 44 (L) 2018    JOSÉ 8.6 2018    BILITOTAL 0.7 2018    ALBUMIN 3.3 (L) 2018    ALKPHOS 121 2018    ALT 18 2018    AST 54 (H) 2018        INR RESULTS:  Lab Results   Component Value Date    INR 1.8 (A) 2018    INR 2.7 2018       Lab Results   Component Value Date    MAG 2.1 2017     Lab Results   Component Value Date    NTBNPI 811 2017     Lab Results   Component Value Date    NTBNP 1687 (H) 2015       Diagnostics:  Recent Results (from the past 4320 hour(s))   ECHO LVAD Complete *    Narrative    [id number]  [facility]  [id number]  [id number]^HAN^RADHA^[facility]  Echocardiography Laboratory  [address] [place] [id number]     Name: DANIEL RICHARD  MRN: [id number]  : 1950  Study Date: 2017 11:18 AM  Age: 67 yrs  Gender: Male  [facility] [facility] [place] For Study: CHF  History: LVAD  Ordering Physician: RADHA SHORT  Performed By: Natividad Jorgensen RDCS     BSA: 2.0 m2  Height: 68 in  Weight: 185 lb  BP: 101/85  mmHg  _____________________________________________________________________________  __        Procedure  LVAD Echocardiogram with two-dimensional, color and spectral Doppler  performed.  _____________________________________________________________________________  __        Interpretation Summary  HM II LVAD Study at 8800 RPM. Technically difficult due to extremely poor  acoustic windows.     Left ventricular wall thickness and size is normal (LVEDD = 5.6 cm; LVESD =  4.9 cm).  Visual estimate of LVEF is <30% which is severely reduced.  Inflow cannula visualized in LV with non-pulsatile flow by PW spectral Doppler  at velocities < 20 cm/s and no color flow demonstrated at a Nyquist limit of  60 cm/s. In one view there is a small echodensity which is probable artifact.  Clinical correlation required. Outflow cannula visualized in the aorta with  low pulsatile flow (PSV 74 cm/s, EDV 30 cm/s) on Spectral Doppler and color  flow demonstrated.  Septum midline with slight bowing towards LV in diastole.  Moderately reduced RV systolic function. Unable to assess RV size but on  limited views appears enlarged.  Aortic valve poorly visualized but appears closed with no Doppler color flow  across valve.  Mitral annular calcification and mitral valve thickening with no significant  dysfunction.  TV not visualized.  PA systolic pressure 24 mmHg plus RA pressure.  IVC not visualized.  No obvious pericardial effusion.        Compared to prior study (8/2017), biventricular function appears worsened on  some views and septum bows slightly to the left. Inflow velocities are less  pulsatile with slightly lower velocites.  _____________________________________________________________________________  __        Left Ventricle  Left ventricular wall thickness is normal. Left ventricular size is normal.  LVEDD = 5.6 cm; LVESD = 4.9 cm. Septum midline. Diastolic function not  assessed in setting of LVAD. Left ventricular wall thickness  and size is  normal (LVEDD = 5.6 cm; LVESD = 4.9 cm).  Visual estimate of LVEF <30% which is severely reduced.  Inflow cannula visualized in LV with non-pulsatile flow by PW spectral Doppler  at velocities < 20 cm/s and no color flow demonstrated at a Nyquist limit of  60 cm/s. In one view there is a small echodensity which is probable artifact.  Clinical correlation required. Outflow cannula visualized in the aorta with  low pulsatile flow (PSV 74 cm/s, EDV 30 cm/s) on Spectral Doppler and color  flow demonstrated.  Septum midline with slight bowing towards LV in diastole.     Right Ventricle  Severe right ventricular dilation is present. Global right ventricular  function is moderately reduced.     Atria  Moderate biatrial enlargement is present.        Mitral Valve  Mild to moderate mitral annular calcification is present.     Aortic Valve  Mild annular calcification and leaflet thickening. Aortic valve appears closed  on limited views.     Tricuspid Valve  The tricuspid valve is normal. Right ventricular systolic pressure is 24mmHg  above the right atrial pressure.     Pulmonic Valve  The pulmonic valve cannot be assessed.     Vessels  Ascending aorta appears normal. IVC not visualized.     Pericardium  No pericardial effusion is present.     _____________________________________________________________________________  __  MMode/2D Measurements & Calculations  IVSd: 0.85 cm  LVIDd: 5.6 cm  LVIDs: 4.9 cm  LVPWd: 0.85 cm     FS: 12.6 %  EDV(Teich): 154.9 ml  ESV(Teich): 113.4 ml  LV mass(C)d: 179.0 grams  LV mass(C)dI: 90.5 grams/m2  RWT: 0.30        Doppler Measurements & Calculations  MV E max trang: 60.1 cm/sec  MV A max trang: 37.8 cm/sec  MV E/A: 1.6  MV dec slope: 171.0 cm/sec2  TR max trang: 243.5 cm/sec  TR max P.8 mmHg  Medial E/e': 25.8     _____________________________________________________________________________  __           Report approved by: Kevin Dubon 2017 02:36 PM        EKG  consistent with Vpaced at a rate of 63.    Assessment and Plan:   Mr. 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 He presents to clinic for routine follow up and will be transferred to [facility] for further evaluation.      Chronic systolic congestive heart failure secondary to ICM s/p LVAD HM II as DT. Complicated by hemolysis, thrombus, hematuria, and multiple CVA's.   Stage D, NYHA Class III  ACEi/ARB Losartan 12.5 mg po daily.   BB Toprol XL 50 mg in AM and 25 mg at HS.   Aldosterone antagonist No, Contraindicated given recent CKD.   SCD prophylaxis ICD.   Fluid status: Hypervolemic. Recommended he resume Lasix 20 mg po daily, inpatient team to further assess.   NSAID use: No.   MAP: 99. Note he did not take AM medications today prior to arrival in clinic.   LDH: Check on admission.   Anticoagulation: To be obtained inpatient.   Antiplatelet: ASA held due to hematuria.   He is not currently a candidate for pump exchange given multiple CVA's. Palliative care deferred per family request for cultural rationale.   - Given he is a difficult lab draw and plan for admission he will obtain further labs on admission to [facility]. He will be transferred for Heparin gtt given concern for worsening thrombus for 3-5 days with reassessment at that time.       HTN.   - Losartan 12.5 mg po daily.       CAD and Hyperlipidemia.   - Lipitor discontinued to minimize meds per family's request.  - ASA held due to hematuria.   - Continue Toprol XL.        CKD, Stage III.   - Labs to be obtained on admission to [facility].     Cough. Note recent CAP with course of antibiotics.   - Chest X-ray per inpatient team.     Abdominal Distention. 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 Last BM Monday.   - Abd X-ray per inpatient team.     Follow up pending hospitalization. Report called to inpatient team, Dr. Bernardino goff MD. Report called to ED as well.      Zuleika Patterson  4/10/2018      CC  ADAM CLEMENTE

## 2018-04-11 NOTE — ED NOTES
[facility], [place]   ED Nurse to Floor Handoff     Sohan Rendon is a 68 year old male who speaks Icelandic and lives with family members,  in a home  They arrived in the ED by ambulance from clinic    ED Chief Complaint: Chest Pain    ED Dx;   Final diagnoses:   Complication involving left ventricular assist device (LVAD), initial encounter         Needed?: No    Allergies:   Allergies   Allergen Reactions     Seasonal Allergies    .  Past Medical Hx:   Past Medical History:   Diagnosis Date     Acute right MCA stroke (H) 6/2013    With L sided hemiparesis      Anemia of chronic disease     Baseline Hb 8-9     BPH (benign prostatic hyperplasia)      CHF (congestive heart failure), NYHA class IV (H) 10/9/2012    s/p HeartMate II.  Was on milrinone and dobutamine prior to LVAD      CKD (chronic kidney disease)     Baseline Cr=     Clostridium difficile colitis 12/2012      Dysphagia     PEG tube placed in 2012.  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  Normal carotid duplex 3/2014.       Esophagitis 12/2012    EGD with esophagitis and multiple douenal ulcers     Fracture of femoral neck, right, closed (H) 2/3/2015    Presumed pathologic as patient is non-weight-bearing and suffered no trauma.  Family declined operative repair during hospital stay 1/23 - 1/30/15.     Gastric and duodenal angiodysplasia with hemorrhage 6/18/2015     GERD (gastroesophageal reflux disease)      Gout      Hematuria      HTN (hypertension)     LDL=59 (3/29/2014)     Hyperlipaemia      Ischemic cardiomyopathy      Mitral regurgitation     s/p MVR with Carbomedics ring      Myocardial infarction 1998    In [facility] without intervention      Nonsenile cataract     BE     PFO (patent foramen ovale)     s/p closure (10/9/2012)     TB lung, latent     s/p 9 months INH in 2012       Baseline Mental status:  "WDL  Current Mental Status changes: at basesline    Infection present or suspected this encounter: no  Sepsis suspected: No  Isolation type: Contact     Activity level - Baseline/Home:  Total Care per daughter, family assists pt to WC/bed  Activity Level - Current:   Total Care    Bariatric equipment needed?: No    In the ED these meds were given:   Medications   heparin  drip 25,000 units in 0.45% NaCl 250 mL (see additional administration details for dose) (1,450 Units/hr Intravenous New Bag 4/11/18 1142)   heparin bolus from infusion pump (not administered)       Drips running?  Yes-heparin    Home pump  Yes LVAD-heartmate 2    Current LDAs  Peripheral IV 04/11/18 Right Hand (Active)   Number of days:0       Urethral Catheter (Active)   Number of days:332       Wound 06/17/16 Mid Perineum Fistula small opening  (Active)   Number of days:663       Labs results:   Labs Ordered and Resulted from Time of ED Arrival Up to the Time of Departure from the ED   CBC WITH PLATELETS DIFFERENTIAL - Abnormal; Notable for the following:        Result Value    RBC Count 3.89 (*)     Hemoglobin 10.4 (*)     Hematocrit 34.6 (*)     MCHC 30.1 (*)     RDW 20.6 (*)     All other components within normal limits   COMPREHENSIVE METABOLIC PANEL - Abnormal; Notable for the following:     Chloride 111 (*)     Glucose 114 (*)     Creatinine 2.00 (*)     GFR Estimate 33 (*)     GFR Estimate If Black 41 (*)     Calcium 8.4 (*)     Albumin 3.2 (*)     All other components within normal limits   INR - Abnormal; Notable for the following:     INR 2.04 (*)     All other components within normal limits   LACTATE DEHYDROGENASE   PLATELETS MONITORED PER HEPARIN TREATMENT PROTOCOL (FOR MEANINGFUL USE       Imaging Studies: No results found for this or any previous visit (from the past 24 hour(s)).    Recent vital signs:   /87  Pulse 61  Temp 97.8  F (36.6  C) (Oral)  Resp 16  Ht 1.727 m (5' 8\")  SpO2 (!) 84%  BMI 27.37 kg/m2    Cardiac " Rhythm: Normal Sinus  Pt needs tele? Yes  Skin/wound Issues: None    Code Status: Full Code    Pain control: pt had none    Nausea control: pt had none    Abnormal labs/tests/findings requiring intervention:     Family present during ED course? Yes   Family Comments/Social Situation comments: pt's daughter at bedside, does interpret, pt does speak some english    Tasks needing completion: None    Dayami Palmer, RN  [phone] [facility]

## 2018-04-12 NOTE — PROGRESS NOTES
[facility]  Patient is currently open to home care services with [facility].  The patient is currently receiving Palliative RN services. PT services were completed and discharged.   [facility]  and team have been notified of patient admission.  [facility] liaison will continue to follow patient during stay.  If appropriate provide orders to resume home care at time of discharge.    Thank you  Melissa Sifuentes RN, BSN  [facility] Liaison  [phone]

## 2018-04-12 NOTE — PHARMACY-ANTICOAGULATION SERVICE
Clinical Pharmacy - Warfarin Dosing Consult     Pharmacy has been consulted to manage this patient s warfarin therapy.  Indication: LVAD/RVAD  Therapy Goal:  (2-2.5 (outpatient anticoagulation clinic notes state 1.8 - 2.5))  Warfarin Prior to Admission: Yes  Warfarin PTA Regimen: No maintenance plan. 4/9 = 4.5 mg, 4/10 = 4.5 mg  Significant drug interactions: Heparin gtt    INR   Date Value Ref Range Status   04/11/2018 2.04 (H) 0.86 - 1.14 Final     INR Protime   Date Value Ref Range Status   04/09/2018 1.8 (A) 0.86 - 1.14 Final     Chromogenic Factor 10   Date Value Ref Range Status   08/10/2014 24 (L) 70 - 130 % Final     Comment:     Therapeutic Range:  A Chromogenic Factor 10 level of approximately 20-40%   inversely correlates with an INR of 2-3 for patients receiving Warfarin.   Chromogenic Factor 10 levels below 20% indicate an INR greater than 3 and   levels above 40% indicate an INR less than 2.       Factor 2 Assay   Date Value Ref Range Status   07/21/2014 25 (L) 60 - 140 % Final     Comment:     Analyte Specific Reagents (ASRs) are used in many laboratory tests necessary   for   standard medical care and generally do not require FDA approval.  This test   was   developed and its performance characteristics determined by [facility].  It has not been cleared or approved by   the US Food and Drug Administration.         Recommend warfarin 4.5 mg today.  Pharmacy will monitor Sohan Rendon daily and order warfarin doses to achieve specified goal.      Please contact pharmacy as soon as possible if the warfarin needs to be held for a procedure or if the warfarin goals change.      Susan Guardado, LatoyaD

## 2018-04-12 NOTE — PROGRESS NOTES
Care Coordinator Progress Note     Admission Date/Time:  4/11/2018  Attending MD:  Jaleel Lebron MD     Data  Chart reviewed, discussed with interdisciplinary team.     Patient was admitted from clinic with concern for LVAD thrombus.  Assessment  Concerns with insurance coverage for discharge needs: None.  Current Living Situation: Patient lives with family.  Support System: Supportive and Involved  Services Involved: [facility], PCA and [facility].   Transportation: [facility] Stretcher Ride  Barriers to Discharge: Chronic illness, medical plan of care    Coordination of Care and Referrals: Provided patient/family with options for resumption of services. This writer met with pt's daughter, Antionette Ruffin at pt's bedside, pt sleeping, to discuss discharge planning. Antionette stated that pt currently receives PCA services for 11 hours/day and skilled nursing visits from [facility] that they plan to resume upon discharge.  Antionette states that she is his PCA. These services were confirmed in voicemail received from pt's [facility]  (P: [phone]) who requested an update upon discharge.  Pt currently on IV heparin drip and cardiac monitoring inpatient.  Pt will need stretcher ride scheduled to home through [facility], per pt's daughter.  Intervention  Orders placed with [facility] (P: [phone]) for resumption of home palliative care program. RN evaluation post hospitalization. Assess vital signs, respiratory and cardiac status, activity tolerance, hydration, nutritional status, med setup and management. INR lab draws with results to [facility].   Plan  Anticipated Discharge Date: TBD  Anticipated Discharge Plan: Discharge to home with resumption of previous services.  CC will continue to monitor patient's medical condition and progress towards discharge.  Joanna Roberts RN BSN  6C Unit Care Coordinator  Phone number: [phone]  Pager:  [phone]

## 2018-04-12 NOTE — PROGRESS NOTES
Communication History     User: Prerna Castro LSW Date/time: 4/12/2018  8:57 AM    Comment: Sohan admitted 4/11/18. L/M for DAYAN Knowles. Made her aware of  Partners role and current services.    Context:  Outcome: Left Message    Phone number: [phone] Phone Type:     Comm. type: Telephone Call type: Outgoing    Contact: Benson Relation to patient:         SANJANA Mcintosh  [facility]   [phone] (fax)  [email or web address]

## 2018-04-12 NOTE — PROGRESS NOTES
[facility] Care Coordination Contact  CM notified client was in ED waiting for a bed on cardiology unit due to possible blood clot. CM notified client's PCA agency of client's admission to hospital.   Nena Salazar RN  [facility]   [phone]

## 2018-04-12 NOTE — PLAN OF CARE
Problem: Patient Care Overview  Goal: Plan of Care/Patient Progress Review  Outcome: No Change  D:Pt with past medical history notable for HFwREF secondary to ICM s/p LVAD HM II in 2012 as DT s/p ICD 2011, s/p MVR by carbomedics ring, multiple ischemic CVAs with residual left sided weakness, latent TB, HTN, Hyperlipidemia, PFO s/p closure in 2012, CKD Stage III, BPH, GERD, Gout, Anemia of CD. LVAD course has been complicated by hemolysis with pump thrombus. Pt admitted from clinic for Chest pain and elevated LDH/ concern for thrombus.  A/I: Pt speaks Pakistani only however family in room with pt at all times and family interprets for pt and also helping with cares. (Family refusing ) Pt is alert and per family orientedx4. VSS on RA. Paced, 50s-60s. UOP adequate. Pt is requires total cares. Pt on 2g NA diet, 2L FR. HM2 LVAD #'s stable. Family refused dressing change overnight. Heparin drip held at 0630 and programmed to restart at 0700 and will run at 1200 units/hr.  P: Continue on Heparin gtt, RAMP study today, continue to monitor LDH levels. RN will continue to monitor and assess. RN will update team with any changes/concerns. Evi Maloney

## 2018-04-12 NOTE — PROGRESS NOTES
"D:  Waveforms sent in to industry and were read by .    \"The log captured a low speed advisory about three days ago while on patient cable with speed noted at 8110 RPM. There were elevated naranjo above 10W prior to and after the low speed events with power captured as high as 17.4W. It is possible something may have passed through the pump and caused this lone low speed event and several elevated naranjo.    If the patient has had the their patient cable for longer than a year I would recommend that it is replaced. Some of the voltages seen in the log were on the low end of normal.    Not sure there is a enough evidence to support a short to shield at this point but should the patient have more low speed events a log file would be needed.\"    P:  Continue to monitor parameters.  VAD coordinator available for questions or concerns.    "

## 2018-04-12 NOTE — PLAN OF CARE
Problem: Patient Care Overview  Goal: Plan of Care/Patient Progress Review  Outcome: No Change  D/A/I:  Patient A&O x4, denied pain, palpitations, difficulty breathing, SOB, dizziness, and nausea.  Lung sounds diminished in all fields, LVAD hum noted.  Had 1-2+ edema in legs and feet, had good urine output, see I&O flowsheet.  Reported no BM since 4/9, bowel sounds hypoactive.  Was given scheduled stool softener.  Patient had BM during shift.  In paced rhythm with occasional PVCs, HR 50s-60s, SBP 90s-100s, SaO2 % on room air.  HeartMate II LVAD running at a fixed rate of 8800 rpm, no alarms during shift.  Flow ---, PI 4.3-5.7.  Drive line site clean and dry, no drainage noted.  Heparin gtt decreased to 1050 units/hr, 10a recheck due 2145.  Was repositioned in bed with 2-person assist, transferred to chair with mechanical lift.  Daughter stayed with patient during shift.    P:  Continue to monitor pain, VS, heart rhythm, LVAD function, drive line site integrity, fluid status, bowel status, cardiac and respiratory status.  Notify care team of changes in patient condition or other concerns.

## 2018-04-12 NOTE — PLAN OF CARE
Problem: Patient Care Overview  Goal: Plan of Care/Patient Progress Review  Outcome: No Change  [place] pt s/p CVA with Heartmate 2 and elevated LDH comes in with CP. Presently pain free. Family in the room helping with cares. SB/SR 40s-60s. Family refusing  because they will interpret. They also state his skin is intact, float nurse concurred. Elevated LDH, on heparin drip at 1450 units/hr.

## 2018-04-12 NOTE — PROGRESS NOTES
"                              Cardiology Progress Note  Sohan Rendon MRN: [id number]  Age: 68 year old, : 1950  Date: @todaysdater@             Subjective     No acute overnight events. Feeling well. Cough is resolving. No further chest pain. Nursing notes reviewed. Daughter at the bedside           Objective     /88 (BP Location: Right arm)  Pulse 61  Temp 98.3  F (36.8  C) (Oral)  Resp 18  Ht 1.727 m (5' 8\")  Wt 93 kg (205 lb)  SpO2 99%  BMI 31.17 kg/m2      Intake/Output Summary (Last 24 hours) at 18 1706  Last data filed at 18 1400   Gross per 24 hour   Intake              360 ml   Output             1350 ml   Net             -990 ml     Vitals:    18 0600   Weight: 93 kg (205 lb)     Gen: NAD  HEENT: anicteric, MMM, oropharynx clear  CV: LVAD hum, JVP flat   Resp: CTAB anteriorly   Abd: +BS, soft, NTND, driveline emerging from RUQ with clean bandage overtop without surrounding erythema or tenderness  Ext: no LE edema   : [place] in place, blood in catheter   Neuro: does not move left side at baseline  Psych: normal affect     Lines:  LVAD Driveline     Drips  Hep gtt          Data:       LABS reviewed and pertinent for:    LDH downtrending  pfHgb Downtrending          Medications     Reviewed         Assessment and Plan:     Mr. Rendon is a 68 year old male with a past medical history notable for HFwREF secondary to ICM s/p LVAD HM II in  as DT s/p ICD , s/p MVR by carbomedics ring, multiple ischemic CVAs with residual left sided weakness, latent TB, HTN, Hyperlipidemia, PFO s/p closure in , CKD Stage III, BPH, GERD, Gout, Anemia of CD.. 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   He present to [facility] on 18 for evaluation of chest pain with power spikes and low velocities on his HMII with " elevated pfHgb concerning for LVAD thrombosis.     Changes Today  - LDH downtrending. Continue Heparin GTT   - PT/OT ordered   - Cancel RAMP study given risk of strokes   - Increase toprol back to home dose      # LVAD with Low PIs, High Flows and [place]  # ICM s/p HM II LVAD as DT c/b Recurrent Device Thrombi, Recurrent Hemolysis, GI/Urogenital Bleeding, and Multiple Embolic CVAs, NYHA Class IIIB, ACC/AHA Stage D  Patient initially developed chest pain with LVAD interrogation in clinic today showing [place] as high as 17. Low speed operation alarm also noted concerning for thrombus. Could also be related to a suck down event and less likely hypovolemia (given power spikes). Plasma free hgb elevated to 90 more c/w thrombus. LDH pending (multiple hemolyzed samples). Patient's INR has been therapeutic for the past few months except for the check on 4/9 that was 1.8.   - Trend LDH and pfHgb  - Continue Warfarin - Goal 1.8-2.5.  - Continue home arb  - Continue home toprol dose   - Hold home lasix (taken prn)  - High Intensity Hep Gtt.      #Chronic Cough  #Hx of Latent TB  No hemoptysis/fevers/chills to suggest active pulm tb. CT chest without evidence of active tb in march 2018. Will watch closely. Needs follow up of his pulmonary nodules.      #CKDIII - Baseline Cr 1.8-2.2  Creatinine at baseline.      #Hx of CVA  #Hx of Seizures  Continue PTA Keppra     #HTN  - Continue PTA losartan 12.5mg qday     #Chronic Normocytic Anemia  Roughly stable.      #BPH  - Continue PTA finasteride     #GERD  - Increased dose to 40mg of Pantoprazole (PTA 20mg)      #Insomnia  - Continue PTA melatonin     Code: Full code   Diet: 2g NA and 2L Fluid restriction  PPx: High intensity hep gtt, PO protonix      Dispo: Pending resolution of likely thrombus      Plan of care discussed with Dr. Bernardino Dupree MD  Internal Medicine Resident   Cardiology Service  [phone]                      I have reviewed today's vital signs,  notes, medications, labs and imaging.  I have also seen and examined the patient and agree with the findings and plan as outlined above.  Pt with daughter at bedside.  No complaints.  VSS with BP 97/76 and  cc.  Lungs clear and mechanical LVAD hum.  Las with Cr 1.9 and WBC 6.6 with INR 2.30 and LDH 1376 to 1281.  Assessment: Pt with endstage heart failure and previous CVA events with LVAD pump in place now with probable pump thrombosis on heparin.  Will not advance antiplt therapy due to hx of previous strokes.  Plan discussed with family and team.     Jaleel Lebron MD, PhD  Professor, Heart Failure and Cardiac Transplantation  [facility]

## 2018-04-13 NOTE — PROGRESS NOTES
[facility] Care Coordination Contact  PC from Pilar nurse JOHN at [facility]. Update is that he will continue with hospital admission through the weekend. Will review again next week.    SANJANA Mcintosh  [facility]   [phone] (fax)  [email or web address]

## 2018-04-13 NOTE — PROCEDURES
The patient's HeartMate LVAD was interrogated 4/13/2018  * Speed 8800 rpm   * Pulsatility index 5-5.9   * Power 4.9-5.1 Slaughter   * Flow - - - L/minute   Fluid status: euvolemic    Alarms were reviewed, and notable for low flows.   The driveline exit site was inspected, dressing CDI.   All external components were inspected and showed no evidence of damage or malfunction, none replaced.   No changes to VAD settings made

## 2018-04-13 NOTE — PLAN OF CARE
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 LVAD course has been complicated by hemolysis with pump thrombus. Pt admitted from clinic for Chest pain and elevated LDH/ concern for thrombus.  A/I: Pt speaks Mongolian only however family in room with pt at all times and family interprets for pt and also helping with cares. (Family refusing ) Pt is alert and per family orientedx4. VSS on RA. Paced, 50s-60s. UOP adequate per family report, urine not saved. Pt is requires total cares. Pt on 2g NA diet, 2L FR. HM2 LVAD #'s stable. Heparin drip running at 950 units/hr. Next 10A recheck due today 4/13 with AM labs. Results pending.   P: Continue on Heparin gtt, continue to monitor LDH levels. Changes Today, PT/OT ordered, increase toprol back to home dose per MD note. RN will continue to monitor and assess. RN will update team with any changes/concerns. Evi Maloney

## 2018-04-13 NOTE — PLAN OF CARE
Problem: Patient Care Overview  Goal: Plan of Care/Patient Progress Review  Outcome: No Change  Pt is alert, oriented, and able to make his needs known.   Tylenol was administered x 1 for a headache. Pt reported relief.   VSS with BP slightly elevated for pt. NP was made aware.   Heart rhythm paced 70s with 0-8 PVS.   No flow level noted on LVAD. Provider and coordinator are aware as this is pts baseline.   Heparin running at 950 u/hr and is therapeutic per Hep 10A level.   Will continue to assess VS, heart rhythm, and labs. Changes and concerns will be reported to provider.

## 2018-04-13 NOTE — PLAN OF CARE
Problem: Patient Care Overview  Goal: Plan of Care/Patient Progress Review  PT 6C: PT orders received. Defer. Per chart review and discussion with pt and family member, pt receives 11hrs of PCA services a day and lives with his three children who are able to provide total cares. Family has a colleen lift, pt able to sit up in wheelchair and/or recliner 2x per day for an hour. Family reports they have been managing his total cares well, provide ROM daily. No acute PT needs identified as pt at baseline mobility with total cares available at home, family IND with ROM program.

## 2018-04-13 NOTE — PROGRESS NOTES
D: Stopped by to see patient. No VAD related questions or concerns at this time.  No VAD alarms noted on VAD interrogation.  I: Discussed POC and provided support and listened to patient and care giver's thoughts and concerns.  P: Continue to follow patient and address any questions or concerns patient and or caregiver may have.

## 2018-04-13 NOTE — PROGRESS NOTES
[facility]   Cardiology II Service / Advanced Heart Failure  Daily Progress Note      Patient: Sohan Rendon  MRN: [id number]  Admission Date: 4/11/2018  Hospital Day # 2    Assessment/plan: Mr. 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 He present to [facility] on 4/11/18 for evaluation of chest pain with power spikes and low velocities on his HMII with elevated pfHgb concerning for LVAD thrombosis.      Changes Today:  - increase losartan for BP control      # LVAD with Low PIs, High Flows and [place]  # ICM s/p HM II LVAD as DT c/b Recurrent Device Thrombi, Recurrent Hemolysis, GI/Urogenital Bleeding, and Multiple Embolic CVAs, NYHA Class IIIB, ACC/AHA Stage D  Patient initially developed chest pain with LVAD interrogation in clinic today showing [place] as high as 17. Low speed operation alarm also noted concerning for thrombus. Could also be related to a suck down event and less likely hypovolemia (given power spikes). Plasma free hgb elevated to 90 more c/w thrombus. INR has been therapeutic for the past few months except for the check on 4/9 that was 1.8.   - Trend LDH 1075<-1235<-1281<-1376  - Continue warfarin per pharmacy - goal 1.8-2.5.  - Increase losartan 25 mg daily for high MAPs  - Continue home Toprol 50/25 mg   - Hold home lasix (taken prn)  - High Intensity Hep Gtt       #Chronic Cough  #Hx of Latent TB  No hemoptysis/fevers/chills to suggest active pulm tb. CT chest without evidence of active TB in March 2018.  "Will watch closely. Needs follow up of his pulmonary nodules.       #CKDIII  Creatinine at baseline, 1.8-2.2, monitor w increased losartan      #Hx of CVA  #Hx of Seizures  Continue PTA Keppra      #Chronic Normocytic Anemia  Stable       #BPH  Continue PTA finasteride      #GERD  Continue pantoprazole 40 mg (PTA 20mg)       #Insomnia  Continue PTA melatonin      Code: Full code   Diet: 2g NA and 2L Fluid restriction  PPx: High intensity hep gtt, PO protonix   Dispo: Pending resolution of likely thrombus   ================================================================    Subjective/24-Hr Events:   Last 24 hr care team notes reviewed. No overnight events. Patient reports no complaints - interviewed w daughter as interpretter per family request. He denies orthopnea, PND, cough, chest pain. No vision changes or HA. His left sided weakness is at baseline.     ROS:  4 point ROS including respiratory, CV, GI and  (other than that noted in the HPI) is negative.     Medications: Reviewed in EPIC.     Physical Exam:   BP 93/74  Pulse 61  Temp 97.9  F (36.6  C) (Oral)  Resp 18  Ht 1.727 m (5' 8\")  Wt 93 kg (205 lb)  SpO2 99%  BMI 31.17 kg/m2    GENERAL: Appears comfortable, chronically ill, in no distress.  HEENT: Eye symmetrical, no discharge or icterus bilaterally. Mucous membranes moist and without lesions.  NECK: Supple, JVD not appreciable at 90 degrees.   CV: +mechanical LVAD hum   RESPIRATORY: Respirations regular, even, and unlabored. Lungs CTA throughout.   GI: Soft and mildly distended with normoactive bowel sounds present in all quadrants. No tenderness, rebound, guarding.   EXTREMITIES: No peripheral edema. Non pulsatile.   NEUROLOGIC: Alert and oriented x 3. Left sided hemiparesis per baseline.   MUSCULOSKELETAL: No joint swelling or tenderness.   SKIN: No jaundice. No rashes or lesions.     Labs:  CMP    Recent Labs  Lab 04/13/18  0626 04/12/18  0539 04/11/18  1103    142 142   POTASSIUM 3.9 3.8 " 4.3   CHLORIDE 112* 112* 111*   CO2 19* 18* 22   ANIONGAP 10 12 9   * 108* 114*   BUN 22 23 23   CR 1.86* 1.93* 2.00*   GFRESTIMATED 36* 35* 33*   GFRESTBLACK 44* 42* 41*   JOSÉ 8.3* 8.5 8.4*   MAG 2.1 2.2  --    PROTTOTAL  --   --  7.1   ALBUMIN  --   --  3.2*   BILITOTAL  --   --  0.8   ALKPHOS  --   --  93   AST  --   --  Unsatisfactory specimen - hemolyzed   ALT  --   --  19       CBC    Recent Labs  Lab 04/12/18  0539 04/11/18  2024 04/11/18  1103   WBC 6.6 7.1 6.5   RBC 3.86* 3.69* 3.89*   HGB 10.2* 9.8* 10.4*   HCT 34.4* 32.9* 34.6*   MCV 89 89 89   MCH 26.4* 26.6 26.7   MCHC 29.7* 29.8* 30.1*   RDW 20.9* 20.8* 20.6*    201 193       INR    Recent Labs  Lab 04/13/18  0626 04/12/18  0539 04/11/18  1103 04/09/18   INR 2.58* 2.39* 2.04* 1.8*       Time/Communication  I personally spent a total of 25 minutes. Of that 15 minutes was counseling/coordination of patient's care. Plan of care discussed with patient. See my note above for details.    Patient discussed with Dr. Lebron .      Kelly Shepard, ANISHA, NP-C  Advanced Heart Failure/Cardiology II Service  Pager [phone]

## 2018-04-13 NOTE — PLAN OF CARE
Problem: Patient Care Overview  Goal: Plan of Care/Patient Progress Review  OT 6C: OT orders received. Defer. PT discussed cares with pt and family member, pt receives 11hrs of PCA services a day and lives with his three children who are able to provide total cares. Family has a colleen lift, pt able to sit up in wheelchair and/or recliner 2x per day for an hour. Family reports they have been managing his total cares well, provide ROM daily. No acute OT needs identified as pt at baseline mobility with total cares available at home, family IND with ROM program.

## 2018-04-13 NOTE — PROGRESS NOTES
Transplant Admission Psychosocial Assessment    Patient Name: Sohan Rendon  : 1950  Age: 68 year old  MRN: [id number]    Met with Sohan and his daughter Antionette to obtain psychosocial update and provide education about SW role while inpatient.    Presenting Information   Living Situation: Patient lives with daughter Antionette in apartment in [place]. Wheelchair accessible and grounded outlets present.   If not local, plans for short term stay:  N/A  Previous Functional Status: Sohan is dependent on family and PCA's (Daughter Antionette and Son Kathi) for all ADLs. He is non-mobile. He does not drive and utilizes [employer] for shorter rides and [facility] stretcher transportation for appointments/other  transportation needs.   Cultural/Language/Spiritual Considerations: Patient speaks Stateless. His family members translate and provide information on his behalf.     Support System  Primary Support Person Daughter: Daisha.   Other support:  Daughter Antionette and Son Kathi are patient's PCAs. Patient also has Daughter Almaz, Daughter Donn and son Teddy whom are all available and supportive.     Sohan also has 4 grandchildren (all under age 7) whom live close by and provide him talon and emotional support.     Plan for support in immediate post-transplant period: N/A    Health Care Directive  Decision Maker: Daughter Daisha  Alternate Decision Maker: Son Teddy   Health Care Directive: Provided Copy and Patient considering completing: SW provided Antionette a copy of advance directive and she is going to bring home to have conversation with Sohan and other family members. SW informed Antionette of options to receive notarization.     Mental Health/Coping:   History of Mental Health: None  History of Chemical Health: None  Current status: No current or past history of chemical use, illicit drug use, or FDC time, DUI's or parole.   Coping: On hard days, it is reported by Antionette that Sohan amee by watching the new  "([facility]) and being with family.   Services Needed/Recommended: None at this time.     Financial   Income: SSDI  Insurance and medication coverage: [employer]  Financial concerns: None  Resources needed: None     Education provided by SW: Social Work role inpatient setting, availability of support groups, information about Health Care Directive.     Assessment and recommendations and plan:  Sohan was eating breakfast and said \"hi\" during visit today. Antionette, patient's daughter and one of his PCAs, was at bedside and provided information on behalf of Sohan. She stated that the family is considering filling out Health Care Directive with Sohan and she asked for a copy which SW provided. There are no mental health concerns, history of or current alcohol/drug use. Antionette reported adequate income via [employer], and adequate insurance coverage via [employer]. Family continues to provide great support and care giving for Sohan, and Antionette reports that dressing changes continue to go smoothly. She stated that Sohan does not have any social work needs or concerns/questions at this time. SW will continue to follow for support as needed.     CAMRON Patton Student       "

## 2018-04-14 NOTE — PROCEDURES
The patient's HeartMate LVAD was interrogated 4/13/2018  * Speed 8800 rpm   * Pulsatility index 5-5.7  * Power 4.9 Slaughter   * Flow - - - L/minute   Fluid status: euvolemic    Alarms were reviewed, and notable for low flows.   The driveline exit site was inspected, dressing CDI.   All external components were inspected and showed no evidence of damage or malfunction, none replaced.   No changes to VAD settings made

## 2018-04-14 NOTE — PROGRESS NOTES
[facility]   Cardiology II Service / Advanced Heart Failure  Daily Progress Note      Patient: Sohan Rendon  MRN: [id number]  Admission Date: 4/11/2018  Hospital Day # 3    Assessment/plan: Mr. 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 He present to [facility] for evaluation of chest pain with power spikes and low velocities on his HMII with elevated pfHgb concerning for LVAD thrombosis.      Changes Today:  - no changes today, continue warfarin       # LVAD with Low PIs, High Flows and [place]  # ICM s/p HM II LVAD as DT c/b Recurrent Device Thrombi, Recurrent Hemolysis, GI/Urogenital Bleeding, and Multiple Embolic CVAs, NYHA Class IIIB, ACC/AHA Stage D  Patient initially developed chest pain with LVAD interrogation in clinic today showing [place] as high as 17. Low speed operation alarm also noted concerning for thrombus. Could also be related to a suck down event and less likely hypovolemia (given power spikes). Plasma free hgb elevated to 90 more c/w thrombus. INR has been therapeutic for the past few months.  - Trend <-1075<-1235<-1281<-1376  - Anticoagulation: warfarin per pharmacy - goal 1.8-2.5.  - ASA: not on due to prior bleeding  - Continue high intensity heparin Gtt, Xa 0.47 today  - ACEi/ARB: Increased losartan 25 mg daily for high MAPs 4/13  - BB: Continue home Toprol 50/25 mg   - Fluid: Hold home lasix (taken prn), monitor fluid status      #Chronic Cough  #Hx of Latent TB  No  "hemoptysis/fevers/chills to suggest active pulm TB. CT chest without evidence of active TB in March 2018. Will watch closely. Needs follow up of his pulmonary nodules as outpatient.        #CKDIII  Creatinine at baseline, 1.8-2.2, monitor w increased losartan      #Hx of CVA  #Hx of Seizures  Continue PTA Keppra      #Chronic Normocytic Anemia  Stable       #BPH  Continue PTA finasteride      #GERD  Continue pantoprazole 40 mg (PTA 20mg)       #Insomnia  Continue PTA melatonin      Code: Full code   Diet: 2g NA and 2L Fluid restriction  PPx: High intensity hep gtt, PO protonix   Dispo: Pending resolution of likely thrombus   ================================================================    Subjective/24-Hr Events:   Last 24 hr care team notes reviewed. No overnight events. Patient reports no complaints - interviewed w daughter as interpretter per family request. He continue to deny orthopnea, PND, cough, chest pain. No vision changes or HA. His left sided weakness is at baseline.     ROS:  4 point ROS including respiratory, CV, GI and  (other than that noted in the HPI) is negative.     Medications: Reviewed in EPIC.     Physical Exam:   BP (!) 83/75 (BP Location: Right arm)  Pulse 61  Temp 98  F (36.7  C) (Oral)  Resp 16  Ht 1.727 m (5' 8\")  Wt 92.1 kg (203 lb)  SpO2 97%  BMI 30.87 kg/m2    GENERAL: Appears comfortable, chronically ill, in no distress.  HEENT: Eye symmetrical, no discharge or icterus bilaterally. Mucous membranes moist and without lesions.  NECK: Supple, JVD not appreciable at 90 degrees.   CV: +mechanical LVAD hum   RESPIRATORY: Respirations regular, even, and unlabored. Lungs CTA throughout.   GI: Soft and mildly distended with normoactive bowel sounds present in all quadrants. No tenderness, rebound, guarding.   EXTREMITIES: No peripheral edema. Non pulsatile.   NEUROLOGIC: Alert and oriented x 3. Left sided hemiparesis per baseline.   MUSCULOSKELETAL: No joint swelling or tenderness. "   SKIN: No jaundice. No rashes or lesions.     Labs:  CMP    Recent Labs  Lab 04/14/18  0415 04/13/18  0626 04/12/18  0539 04/11/18  1103    141 142 142   POTASSIUM 3.8 3.9 3.8 4.3   CHLORIDE 112* 112* 112* 111*   CO2 19* 19* 18* 22   ANIONGAP 11 10 12 9   * 126* 108* 114*   BUN 20 22 23 23   CR 1.83* 1.86* 1.93* 2.00*   GFRESTIMATED 37* 36* 35* 33*   GFRESTBLACK 45* 44* 42* 41*   JOSÉ 8.4* 8.3* 8.5 8.4*   MAG 2.2 2.1 2.2  --    PROTTOTAL  --   --   --  7.1   ALBUMIN  --   --   --  3.2*   BILITOTAL  --   --   --  0.8   ALKPHOS  --   --   --  93   AST  --   --   --  Unsatisfactory specimen - hemolyzed   ALT  --   --   --  19       CBC    Recent Labs  Lab 04/14/18 0415 04/12/18  0539 04/11/18 2024 04/11/18  1103   WBC 5.2 6.6 7.1 6.5   RBC 3.57* 3.86* 3.69* 3.89*   HGB 9.6* 10.2* 9.8* 10.4*   HCT 31.7* 34.4* 32.9* 34.6*   MCV 89 89 89 89   MCH 26.9 26.4* 26.6 26.7   MCHC 30.3* 29.7* 29.8* 30.1*   RDW 20.7* 20.9* 20.8* 20.6*    189 201 193       INR    Recent Labs  Lab 04/14/18 0415 04/13/18  0626 04/12/18  0539 04/11/18  1103   INR 2.72* 2.58* 2.39* 2.04*       Time/Communication  I personally spent a total of 25 minutes. Of that 15 minutes was counseling/coordination of patient's care. Plan of care discussed with patient. See my note above for details.    Patient discussed with Dr. Lebron .      Kelly Shepard, ANISHA, NP-C  Advanced Heart Failure/Cardiology II Service  Pager [phone]

## 2018-04-14 NOTE — PLAN OF CARE
Problem: Patient Care Overview  Goal: Plan of Care/Patient Progress Review  Outcome: Improving  D: Pt with hx ICM s/p HM2 LVAD 2012. Admitted 4/11 from clinic with R sided CP, elevated LDH, power spikes.  I/A: Heparin gtt infusing therapeutically at 950 units/hr. Monitored/assessed pt. Provided for comfort. Daughter at bedside assisting with interpreting, personal cares, turns/positioning. Pt denies pain. LVAD numbers WDL except flows (---), baseline, team aware.  P: Continue to monitor and assess pt condition and contact treatment team with questions or concerns.

## 2018-04-14 NOTE — PLAN OF CARE
Problem: Cardiac: Heart Failure (Adult)  Goal: Signs and Symptoms of Listed Potential Problems Will be Absent, Minimized or Managed (Cardiac: Heart Failure)  Signs and symptoms of listed potential problems will be absent, minimized or managed by discharge/transition of care (reference Cardiac: Heart Failure (Adult) CPG).   Outcome: No Change  D/I: Monitor shows V paced at 70 with rare PVCs. VAD values within patient norms with flows consistently showing ---. 5PM LDH 1022 (1075 this AM). Continues on heparin drip at 950 units/hour per protocol. INR 2.58-received 3.5 mg warfarin this evening. Daughter and son with patient all shift and provide majority of cares. Good PO intake with food brought by family. Family states that he is urinating well but there has been no urine for staff to measure. Denies pain or shortness of breath. See flowsheets for assessments and additional data.  A: Stable VAD with continued flows with no readings.   P: Monitor VAD flow values and LHD. Heparin per protocol. Assess for thrombus symptoms. Encourage increased activity and participation in cares. Continue current cares and notify providers with questions or concerns.    04/13/18 6192   Cardiac: Heart Failure   Problems Assessed (Heart Failure) all   Problems Present (Heart Failure) cardiac pump dysfunction;dysrhythmia/arrhythmia;situational response

## 2018-04-15 NOTE — PLAN OF CARE
Problem: Patient Care Overview  Goal: Plan of Care/Patient Progress Review  D- Elevated LDH  I/A-  HM 2, numbers WNL with exception to flow in the ---, MD aware and not new for pt per report. V paced 60-80. Heparin gtt therapeutic at 950 units/hr. Family at bedside assisting in cares.   P- Continue to monitor and notify team of changes.

## 2018-04-15 NOTE — PLAN OF CARE
Problem: Patient Care Overview  Goal: Plan of Care/Patient Progress Review  Paced. VSS. HM2 numbers WNL. Driveline drsg changed. Family at bedside assisting in care. Continue to monitor and notify team of changes.

## 2018-04-15 NOTE — PROGRESS NOTES
[facility]   Cardiology II Service / Advanced Heart Failure  Daily Progress Note      Patient: Sohan Rendon  MRN: [id number]  Admission Date: 4/11/2018  Hospital Day # 4    Assessment/plan: Mr. 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 He present to [facility] on 4/11/18 for evaluation of chest pain with power spikes and low velocities on his HMII with elevated pfHgb concerning for LVAD thrombosis.      Changes Today:  - no changes today, continue warfarin   - d/c when LDH is stable      # LVAD with Low PIs, High Flows and [place]  # ICM s/p HM II LVAD as DT c/b Recurrent Device Thrombi, Recurrent Hemolysis, GI/Urogenital Bleeding, and Multiple Embolic CVAs, NYHA Class IIIB, ACC/AHA Stage D  Patient reported chest pain with LVAD interrogation in clinic showing power ~17. Low speed operation alarm also noted concerning for thrombus. Plasma free hgb 90. INR has been therapeutic for the past few months. Baseline LDH 800s.   - Trend <-901<-1075<-1235<-1281<-1376, 900s may be new baseline   - Anticoagulation: warfarin per pharmacy, goal 1.8-2.5.  - ASA: not on due to prior bleeding  - Continue high intensity heparin gtt, Xa 0.43 today  - ACEi/ARB: increased losartan 25 mg daily for high MAPs 4/13  - BB: continue Toprol 50/25 mg, patient is paced  - Fluid: Hold home lasix (taken prn), monitor fluid status      #Chronic Cough  #Hx of Latent TB  No hemoptysis/fevers/chills to suggest active pulm TB. CT chest without evidence  "of active TB in March 2018. Will watch closely. Needs follow up of his pulmonary nodules as outpatient.        #CKDIII  Creatinine at baseline, 1.8-2.2, monitor w increased losartan      #Hx of CVA  #Hx of Seizures  Continue PTA Keppra      #Chronic Normocytic Anemia  Stable       #BPH  Continue PTA finasteride      #GERD  Continue pantoprazole 40 mg (PTA 20mg)       #Insomnia  Continue PTA melatonin      Code: Full code   Diet: 2g NA and 2L Fluid restriction  PPx: High intensity hep gtt, PO protonix   Dispo: Pending resolution of likely thrombus   ================================================================    Subjective/24-Hr Events:   Last 24 hr care team notes reviewed. No overnight events. Patient reports no complaints - interviewed w daughter as interpretter per family request. He continue to deny orthopnea, PND, cough, chest pain. No vision changes or HA. His left sided weakness is at baseline.     ROS:  4 point ROS including respiratory, CV, GI and  (other than that noted in the HPI) is negative.     Medications: Reviewed in EPIC.     Physical Exam:   BP 95/61  Pulse 61  Temp 97.9  F (36.6  C) (Oral)  Resp 16  Ht 1.727 m (5' 8\")  Wt 92.1 kg (203 lb)  SpO2 100%  BMI 30.87 kg/m2    GENERAL: Appears comfortable, chronically ill, in no distress.  HEENT: Eye symmetrical, no discharge or icterus bilaterally. Mucous membranes moist and without lesions.  NECK: Supple, JVD not appreciable at 90 degrees.   CV: +mechanical LVAD hum   RESPIRATORY: Respirations regular, even, and unlabored. Lungs CTA throughout.   GI: Soft and mildly distended with normoactive bowel sounds present in all quadrants. No tenderness, rebound, guarding.   EXTREMITIES: No peripheral edema. Non pulsatile.   NEUROLOGIC: Alert and oriented x 3. Left sided hemiparesis per baseline.   MUSCULOSKELETAL: No joint swelling or tenderness.   SKIN: No jaundice. No rashes or lesions.     Labs:  [facility]    Recent Labs  Lab 04/14/18  0415 " 04/13/18  0626 04/12/18  0539 04/11/18  1103    141 142 142   POTASSIUM 3.8 3.9 3.8 4.3   CHLORIDE 112* 112* 112* 111*   CO2 19* 19* 18* 22   ANIONGAP 11 10 12 9   * 126* 108* 114*   BUN 20 22 23 23   CR 1.83* 1.86* 1.93* 2.00*   GFRESTIMATED 37* 36* 35* 33*   GFRESTBLACK 45* 44* 42* 41*   JOSÉ 8.4* 8.3* 8.5 8.4*   MAG 2.2 2.1 2.2  --    PROTTOTAL  --   --   --  7.1   ALBUMIN  --   --   --  3.2*   BILITOTAL  --   --   --  0.8   ALKPHOS  --   --   --  93   AST  --   --   --  Unsatisfactory specimen - hemolyzed   ALT  --   --   --  19       CBC    Recent Labs  Lab 04/14/18 0415 04/12/18  0539 04/11/18 2024 04/11/18  1103   WBC 5.2 6.6 7.1 6.5   RBC 3.57* 3.86* 3.69* 3.89*   HGB 9.6* 10.2* 9.8* 10.4*   HCT 31.7* 34.4* 32.9* 34.6*   MCV 89 89 89 89   MCH 26.9 26.4* 26.6 26.7   MCHC 30.3* 29.7* 29.8* 30.1*   RDW 20.7* 20.9* 20.8* 20.6*    189 201 193       INR    Recent Labs  Lab 04/14/18 0415 04/13/18  0626 04/12/18  0539 04/11/18  1103   INR 2.72* 2.58* 2.39* 2.04*       Time/Communication  I personally spent a total of 25 minutes. Of that 15 minutes was counseling/coordination of patient's care. Plan of care discussed with patient. See my note above for details.    Patient discussed with Dr. Lebron .      Kelly Shepard, DNP, NP-C  Advanced Heart Failure/Cardiology II Service  Pager [phone]

## 2018-04-15 NOTE — PLAN OF CARE
Problem: Patient Care Overview  Goal: Plan of Care/Patient Progress Review  D- Elevated LDH  I/A- LDH trending down 901 today from 1022. HM 2, numbers WNL with exception to flow in the ---, MD aware and not new for pt per report. V paced 60-80 with 0-3 PVC. Heparin gtt therapeutic at 950 units/hr. Family at bedside assisting in cares.   P- Continue to monitor and notify team of changes.

## 2018-04-15 NOTE — PROCEDURES
The patient's HeartMate LVAD was interrogated 4/14/2018  * Speed 8800 rpm   * Pulsatility index 5.6-5.7  * Power 4.8 Slaughter   * Flow - - - L/minute   Fluid status: euvolemic    Alarms were reviewed, and notable for low flows.   The driveline exit site was inspected, dressing CDI.   All external components were inspected and showed no evidence of damage or malfunction, none replaced.   No changes to VAD settings made

## 2018-04-15 NOTE — PLAN OF CARE
Problem: Cardiac: Heart Failure (Adult)  Goal: Signs and Symptoms of Listed Potential Problems Will be Absent, Minimized or Managed (Cardiac: Heart Failure)  Signs and symptoms of listed potential problems will be absent, minimized or managed by discharge/transition of care (reference Cardiac: Heart Failure (Adult) CPG).   Outcome: No Change  D/I: Monitor shows 100% V paced. VAD values within patient norms with flows (---). Heparin drip at 950 units/hour per protocol. Son in room all night, providing physical cares for patient with assist from staff. See flowsheets for assessments and additional data.   A: Stable VAD with continued high LDH.  P: Monitor for symptoms of VAD dysfunction. Encourage increased activity and PO intake. Continue current cares and notify providers with questions or concerns.

## 2018-04-16 NOTE — PROGRESS NOTES
Visited with pt/family on the basis of spiritual support for the pt/family. Reflected with pt/family around their hospital experience, sources of spiritual and emotional support and current spiritual health needs. Pt s daughter talked about her dad current situation and what it means for her and her dad. During my visit, pt was laying down on his bed. His daughter was with him in the room. During our conversation with pt. s daughter, she reported that, she worries about the health of her dad, but optimistic. Her dad has been in and out last 3 years.  I let them know that I could be of support of the pt and his family.  I would be able to coordinate and participate as a spiritual support for both him and his family. Pt receives support from his children and his extended family. Pt/family reported that their tanya is important to them and hope for the future and trust in God.  Emotional support. Reflective conversation integrating illness elements and family spiritual narratives. I shared conversation that would invite God into the room and to bless those present, support them in their suffering. I provided a special prayer asking God to help her dad and to ease and eliminate any suffering and pain that he feels. I gave Islamic Prayer Booklet and several Prayer Bookmarks.      Pt/family received spiritual support and reflective conversation in the context of this hospitalization. The pt/family expressed appreciation for the visit and the encouragement that they felt that they have God s support in their struggles. They requested ongoing Church  support.  PLAN: Will continue to provide support to pt/family during their hospitalization at least 1x/wk

## 2018-04-16 NOTE — PROGRESS NOTES
[facility]   Cardiology II Service / Advanced Heart Failure  Device Interrogation Note  Date of Service: 4/16/2018     The patient's HeartMate LVAD was interrogated 4/16/2018  * Speed 8800 rpm   * Pulsatility index 4.8   * Power 5.1 Slaughter   * Flow --- L/minute   Fluid status: EUVOLEMIC   Alarms were reviewed, and notable for chronic low flow. No power spikes noted since 9/14/18..   The driveline exit site was covered with dressing clean, dry, and intact.   All external components were inspected and showed no evidence of damage or malfunction.    Zuleika Patterson  4/16/2018

## 2018-04-16 NOTE — PROGRESS NOTES
[facility]   Cardiology II Service / Advanced Heart Failure  Daily Progress Note  Date of Service: 4/16/2018      Patient: Sohan Rendon  MRN: [id number]  Admission Date: 4/11/2018  Hospital Day # 5    Assessment and Plan: Mr. 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 He was admitted for power spikes given history of concern for LVAD thrombus.     Concern for LVAD Thrombus. LDH peaked at 1376. Power spike at 17 prior to admission.   -  (923).   - INR therapeutic at 2.43.  - Continue high intensity heparin gtt.   - Defer ASA given history of hematuria.   - No recurrent power spikes.       Chronic systolic congestive heart failure secondary to ICM s/p LVAD HM II as DT. Complicated by hemolysis, thrombus, hematuria, and multiple CVA's.   Stage D, NYHA Class III  ACEi/ARB Losartan 25 mg po daily.   BB Toprol XL 50 mg in AM and 25 mg at HS.   Aldosterone antagonist No, Contraindicated given recent CKD.   SCD prophylaxis ICD.   Fluid status: Near euvolemic state.   MAP: 76-86.  LDH: 936   Anticoagulation: INR 2.43.  Antiplatelet: ASA held due to hematuria.   He is not currently a candidate for pump exchange given multiple CVA's. Palliative care deferred per family request for cultural rationale.     Cough. History of latent TB treated in 2012 with 9 month course of INH. CT chest 3/18 with stable pulmonary nodules.   - Repeat Chest X-ray pending.   - Case reviewed with ID regarding latent TB.       HTN.   - Losartan 25 mg  "po daily     Chronic Medical Issues:    CAD and Hyperlipidemia. Continue Toprol XL.      CKD, Stage III. Cr 1.88.     FEN: Cardiac diet.   PROPHY: Protonix. INR therapeutic.   LINES: PIV  DISPO: Anticipate discharge to home in 1-2 days.   CODE STATUS: FULL code   ================================================================  Interval History/ROS: He states he is feeling better today. He notes persistent productive cough. He denies fever, chills, chest pain, SOB, HOOK, PND, nausea, vomiting, diarrhea, melena, hematochezia, hematuria, and LE edema. He complains of abdominal distention with BM this AM. He is tolerating po intake and remains bedbound.     Last 24 hr care team notes reviewed.   ROS:  4 point ROS including Respiratory, CV, GI and , other than that noted in the HPI, is negative.     Medications: Reviewed in EPIC.     Physical Exam:   BP (!) 84/67 (BP Location: Right arm)  Pulse 58  Temp 97.9  F (36.6  C) (Oral)  Resp 18  Ht 1.727 m (5' 8\")  Wt 92.1 kg (203 lb)  SpO2 100%  BMI 30.87 kg/m2  GENERAL: Appears alert and oriented times three.   HEENT: Eye symmetrical and free of discharge bilaterally. Mucous membranes moist and without lesions.  NECK: Supple and without lymphadenopathy. JVD 8 cm.   CV: S1S2 present with LVAD hum.   RESPIRATORY: Respirations regular, even, and unlabored. Lungs CTA throughout.   GI: Soft and distended with normoactive bowel sounds present in all quadrants. No tenderness, rebound, guarding. No organomegaly. LVAD drive line covered.   EXTREMITIES: Trace bilateral LE edema. 2+ bilateral pedal pulses.   NEUROLOGIC: Alert and orientated x 3. CN II-XII grossly intact. No focal deficits.   MUSCULOSKELETAL: No joint swelling or tenderness.   SKIN: No jaundice. No rashes or lesions.     Data:  CMP  Recent Labs  Lab 04/16/18  0647 04/15/18  1216 04/14/18  0415 04/13/18  0626  04/11/18  1103    138 142 141  < > 142   POTASSIUM 4.0 3.9 3.8 3.9  < > 4.3   CHLORIDE 111* 111* " 112* 112*  < > 111*   CO2 21 19* 19* 19*  < > 22   ANIONGAP 8 8 11 10  < > 9   * 217* 134* 126*  < > 114*   BUN 21 20 20 22  < > 23   CR 1.88* 1.88* 1.83* 1.86*  < > 2.00*   GFRESTIMATED 36* 36* 37* 36*  < > 33*   GFRESTBLACK 43* 43* 45* 44*  < > 41*   JOSÉ 8.4* 8.2* 8.4* 8.3*  < > 8.4*   MAG 2.0 2.0 2.2 2.1  < >  --    PROTTOTAL  --   --   --   --   --  7.1   ALBUMIN  --   --   --   --   --  3.2*   BILITOTAL  --   --   --   --   --  0.8   ALKPHOS  --   --   --   --   --  93   AST  --   --   --   --   --  Unsatisfactory specimen - hemolyzed   ALT  --   --   --   --   --  19   < > = values in this interval not displayed.  CBC  Recent Labs  Lab 04/14/18  0415 04/12/18  0539 04/11/18 2024 04/11/18  1103   WBC 5.2 6.6 7.1 6.5   RBC 3.57* 3.86* 3.69* 3.89*   HGB 9.6* 10.2* 9.8* 10.4*   HCT 31.7* 34.4* 32.9* 34.6*   MCV 89 89 89 89   MCH 26.9 26.4* 26.6 26.7   MCHC 30.3* 29.7* 29.8* 30.1*   RDW 20.7* 20.9* 20.8* 20.6*    189 201 193     INR  Recent Labs  Lab 04/16/18  0647 04/15/18  1216 04/14/18  0415 04/13/18  0626   INR 2.43* 2.62* 2.72* 2.58*     Patient seen and discussed with Dr. Suarez.     Zuleika Patterson [facility]  4/16/2018

## 2018-04-16 NOTE — PLAN OF CARE
Problem: Patient Care Overview  Goal: Plan of Care/Patient Progress Review  Outcome: No Change  No significant events over night. Pt slept well between cares. Family at bedside and attentive to pt. Pt's VSS. LVAD #'s WNL with exception of flow ---. V Paced 60-80's. Pt c/o headache. Tylenol given and effective in relieving pain. Heparin gtt continues at 950 units/hr. Continue to monitor. Notify MD of any changes/concerns.

## 2018-04-16 NOTE — PLAN OF CARE
Problem: Patient Care Overview  Goal: Plan of Care/Patient Progress Review  Outcome: No Change  Pt A/O. See flowsheets for VS + LVAD #s. Paced rhythm. RA. Denies pain. Heparin gtt continues at 950units/hr. CXR completed. LVAD dressing changed. Pt daughter in room, supportive and helping with cares throughout shift. Assisted with repositioning while in bed. Continue with plan of care and report changes to treatment team.

## 2018-04-17 NOTE — PLAN OF CARE
D:pt family talked the importance of their tanya, listened and dialogue about thier Journey  A:pt's family very involved in care and participation of tanya  P:Chaplian is following family also

## 2018-04-17 NOTE — PROGRESS NOTES
Care Coordinator- Discharge Planning     Admission Date/Time:  4/11/2018  Attending MD:  Jaleel Lebron MD   Data  Chart reviewed, discussed with interdisciplinary team.   Patient was admitted for:   1. LVAD (left ventricular assist device) present    2. Complication involving left ventricular assist device (LVAD), initial encounter    Assessment  Concerns with insurance coverage for discharge needs: None.  Current Living Situation: Patient lives with family.  Support System: Supportive and Involved family.   Services Involved: [facility], PCA, [facility]  Transportation: [employer] stretcher ride.     Coordination of Care and Referrals: Provided patient/family with options for resumption of home care.   Per NP, pt is medically ready for discharge. Pt's daughter, Antionette is requesting stretcher ride home with [employer].   Antionette is also requesting resumption of [facility].   Per NP, pt needs a home nebulizer.   Intervention:      Arrangements made with [facility] (Ph: [phone] Fax: [phone]) for Palliative Care Program, RN eval post hospitalization, assess vital signs, respiratory and cardiac status, activity tolerance, hydration, nutritional status, med set up and management. INR lab draws; with results to the [facility].      Script faxed to [facility] (Ph: [phone] Fax: [phone]) for delivery of nebulizer machine to patient's home.      Arrangements made with [employer] (Ph: [phone]) for stretcher ride home today at 2 PM.     Plan  Anticipated Discharge Date:  4/17/18   Anticipated Discharge Plan:  Discharge to home with home care.     CTS Handoff completed:  YES    JAYLEN KLEIN RN BSN  Care Coordinator  [phone]

## 2018-04-17 NOTE — PROGRESS NOTES
HM 2 numbers WNL with exception to flow ---, not new. VSS. RA. Discharge instructions discussed with pt's daughter, daughter verbalizes understanding.

## 2018-04-17 NOTE — PROGRESS NOTES
[facility]  Patient is currently open to home care services with [facility].  The patient is currently receiving Palliative RN services. PT was completed and discharged.  [facility]  and team have been notified of patient admission.  [facility] liaison will continue to follow patient during stay.  The cert period has  so if appropriate please provide orders for start of care with Face to Face at time of discharge.    Thank you  Melissa Sifuentes RN, BSN  [facility] Liaison  [phone]

## 2018-04-17 NOTE — PLAN OF CARE
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 LVAD course has been complicated by hemolysis with pump thrombus. Pt admitted from clinic for Chest pain and elevated LDH/ concern for thrombus.  A/I: Pt speaks Kazakh only however family in room with pt at all times and family interprets for pt and also helping with cares. (Family refusing ) Pt is alert and per family orientedx4. VSS on RA. Paced, 50s-60s. UOP adequate per family report, urine not saved consistently. Pt is requires total cares. Pt on 2g NA diet, 2L FR. HM2 LVAD #'s stable. Heparin drip running at 950 units/hr. Next 10A recheck due today 4/17 with AM labs. Results pending.   P: Continue on Heparin gtt, continue to monitor LDH levels. Changes Today, PT/OT ordered. RN will continue to monitor and assess. RN will update team with any changes/concerns. Evi Maloney

## 2018-04-18 NOTE — PROGRESS NOTES
"Dates of hospitalization: 4/11/18 to 4/17/18  Reason for hospitalization: \"Clot in Heart Pump\"  Vitamin K or FFP administered? No  Inpatient warfarin doses added to calendar? Yes   Medication changes at discharge: Started on Doxycycline 100 mgs twice a day  Warfarin dosing after DC:Discharge orders said 2.5mgs daily but please verify  Patient discharged on Lovenox? No  Next INR date: 4/19/18  Where is the patient discharging to? (home, TCU, staying locally, etc.): Home  Will patient have home care? Yes [facility]  "

## 2018-04-18 NOTE — MR AVS SNAPSHOT
Sohan [facility]   4/18/2018   Anticoagulation Therapy Visit    Description:  68 year old male   Provider:  Estuardo Zepeda, [facility]   Department:  [facility]           INR as of 4/18/2018     Today's INR       Anticoagulation Summary as of 4/18/2018     INR goal    Prior goal 1.8-2.5   Today's INR    Full instructions No maintenance plan   Next INR check 4/19/2018    Indications   LVAD (left ventricular assist device) present [Z95.811]  Long-term (current) use of anticoagulants [Z79.01] [Z79.01]         April 2018 Details    Sun Mon Tue Wed Thu Fri Sat     1               2               3               4               5               6               7                 8               9               10               11               12               13               14                 15               16               17               18         See details      19 20               21                 22               23               24               25               26               27               28                 29               30                     Date Details   04/18 This INR check       Date of next INR:  4/19/2018

## 2018-04-18 NOTE — PROGRESS NOTES
[facility] Care Coordination Contact  Member discharged back to his home yesterday from the hospital. Called [facility] this morning and they are aware of the discharge and working on staffing. Called [facility] agency to let them know dates of hospital admission and that member was back home. Also ML that the unused PCA hours from when member was in the hospital can be flexed and used now that he is at home if needed.  Called  to see how he was doing and spoke with daughters Almaz Adan, and another one.  is doing well. His home care nurse will be out tomorrow to draw his INR. They are waiting for a call from the cardiology clinic for the follow up visit in 1-2 weeks.  also has appt with Dr Scooby Smith. Discussed flex PCA usage and to work this out with 's PCA agency. Transitions log completed.  Yisel Cunha RN, PHN, [facility]   [phone]

## 2018-04-19 NOTE — PROGRESS NOTES
ANTICOAGULATION FOLLOW-UP CLINIC VISIT    Patient Name:  Sohan Rendon  Date:  4/19/2018  Contact Type:  Telephone    SUBJECTIVE:     Patient Findings     Positives No Problem Findings    Comments Spoke to home care nurse.  Patient has 3mg tablets of Warfarin in the home.  Recommended he take 3mg daily and check at next home visit.  Updated Anticoagulation tracking flowsheet.           OBJECTIVE    INR   Date Value Ref Range Status   04/19/2018 2.1  Final     Comment:     Home care INR draw     Chromogenic Factor 10   Date Value Ref Range Status   08/10/2014 24 (L) 70 - 130 % Final     Comment:     Therapeutic Range:  A Chromogenic Factor 10 level of approximately 20-40%   inversely correlates with an INR of 2-3 for patients receiving Warfarin.   Chromogenic Factor 10 levels below 20% indicate an INR greater than 3 and   levels above 40% indicate an INR less than 2.       Factor 2 Assay   Date Value Ref Range Status   07/21/2014 25 (L) 60 - 140 % Final     Comment:     Analyte Specific Reagents (ASRs) are used in many laboratory tests necessary   for   standard medical care and generally do not require FDA approval.  This test   was   developed and its performance characteristics determined by [facility].  It has not been cleared or approved by   the US Food and Drug Administration.         ASSESSMENT / PLAN  INR assessment SUB    Recheck INR In: 4 DAYS    INR Location Homecare INR      Anticoagulation Summary as of 4/19/2018     INR goal    Prior goal 1.8-2.5   Today's INR 2.1   Maintenance plan No maintenance plan   Full instructions 4/19: 3 mg; 4/20: 3 mg; 4/21: 3 mg; 4/22: 3 mg; 4/23: 3 mg   Weekly total 25.5 mg   Plan last modified Blanca Bah RN (4/5/2018)   Next INR check 4/24/2018   Priority INR   Target end date Indefinite    Indications   LVAD (left ventricular assist device) present [Z95.811]  Long-term (current) use of anticoagulants [Z79.01]  [Z79.01]         Anticoagulation Episode Summary     INR check location     Preferred lab     Send INR reminders to [facility]    Comments Goal Range is 1.8-2.3  [facility] comes out to draw INR  Yancy @ [phone]  Daughter Almaz Santiago [phone]      Anticoagulation Care Providers     Provider Role Specialty Phone number    Evon Lemons MD Responsible Cardiology [phone]            See the Encounter Report to view Anticoagulation Flowsheet and Dosing Calendar (Go to Encounters tab in chart review, and find the Anticoagulation Therapy Visit)    Spoke with home care nurse, Benson. Patient was recently discharged.  Clarified that Warfarin at discharge was 3mg tablets, not 1mg tablets.  Gave them their new warfarin recommendation.  No changes in health, medication, or diet. No missed doses, no falls. No signs or symptoms of bleed or clotting.    James Nickerson, RN

## 2018-04-19 NOTE — TELEPHONE ENCOUNTER
MTM referral from: Transitions of Care (recent hospital discharge or ED visit)    MTM referral outreach attempt #2 on April 19, 2018 at 1:49 PM      Outcome: Patient not reachable after several attempts, will route to MTM Pharmacist/Provider as an FYI. Thank you for the referral.    Nya Gates, MTM Coordinator

## 2018-04-19 NOTE — MR AVS SNAPSHOT
Sohan [facility]   4/19/2018   Anticoagulation Therapy Visit    Description:  68 year old male   Provider:  James Nickerson   Department:  [facility]           INR as of 4/19/2018     Today's INR 2.1      Anticoagulation Summary as of 4/19/2018     INR goal    Prior goal 1.8-2.5   Today's INR 2.1   Full instructions 4/19: 3 mg; 4/20: 3 mg; 4/21: 3 mg; 4/22: 3 mg; 4/23: 3 mg   Next INR check 4/24/2018    Indications   LVAD (left ventricular assist device) present [Z95.811]  Long-term (current) use of anticoagulants [Z79.01] [Z79.01]         April 2018 Details    Sun Mon Tue Wed Thu Fri Sat     1               2               3               4               5               6               7                 8               9               10               11               12               13               14                 15               16               17               18               19      3 mg   See details      20      3 mg         21      3 mg           22      3 mg         23      3 mg         24            25               26               27               28                 29               30                     Date Details   04/19 This INR check       Date of next INR:  4/24/2018         How to take your warfarin dose     To take:  3 mg Take 1 of the 3 mg tablets.

## 2018-04-21 NOTE — TELEPHONE ENCOUNTER
[facility] TELEPHONE ENCOUNTER    Chief Complaint   Patient presents with     Medication Question       Sohan Rendon is a 68 year old  (1950),Nurse called today to report: Medication question    ASSESSMENT/PLAN  Page received to person - Elisha Ruffin - with questions re:dosage of patient's medications    No answer on return call    CLAIRE Roche CNP     [place] [facility] TELEPHONE ENCOUNTER    Chief Complaint   Patient presents with     Medication Question     Med Error       Sohan Rendon is a 68 year old  (1950),Nurse called today to report: Medication error    ASSESSMENT/PLAN  Client on Doxycycline. Daughter accidentally gave him 2 tabs this AM (ordered 1 tab PO BID). She also spoke to heart clinic today - they informed her to hold this evening's dose.    Agree with heart clinic    Resume prior order (1 BID) tomorrow    CLAIRE Roche CNP

## 2018-04-24 NOTE — MR AVS SNAPSHOT
Sohan [facility]   4/24/2018   Anticoagulation Therapy Visit    Description:  68 year old male   Provider:  Sarah Osborne, RN   Department:  [facility]           INR as of 4/24/2018     Today's INR 1.6!      Anticoagulation Summary as of 4/24/2018     INR goal    Prior goal 1.8-2.5   Today's INR 1.6!   Full instructions 4/24: 4.5 mg; 4/25: 4.5 mg   Next INR check 4/26/2018    Indications   LVAD (left ventricular assist device) present [Z95.811]  Long-term (current) use of anticoagulants [Z79.01] [Z79.01]         April 2018 Details    Sun Mon Tue Wed Thu Fri Sat     1               2               3               4               5               6               7                 8               9               10               11               12               13               14                 15               16               17               18               19               20               21                 22               23               24      4.5 mg   See details      25      4.5 mg         26            27               28                 29               30                     Date Details   04/24 This INR check       Date of next INR:  4/26/2018         How to take your warfarin dose     To take:  4.5 mg Take 1.5 of the 3 mg tablets.

## 2018-04-24 NOTE — PROGRESS NOTES
ANTICOAGULATION FOLLOW-UP CLINIC VISIT    Patient Name:  Sohan Rendon  Date:  4/24/2018  Contact Type:  Telephone    SUBJECTIVE:     Patient Findings     Positives Change in medications (will finish doxycycline today)    Comments No missed warfarin doses.  Sohan does not take ASA.           OBJECTIVE    INR   Date Value Ref Range Status   04/24/2018 1.6  Final     Chromogenic Factor 10   Date Value Ref Range Status   08/10/2014 24 (L) 70 - 130 % Final     Comment:     Therapeutic Range:  A Chromogenic Factor 10 level of approximately 20-40%   inversely correlates with an INR of 2-3 for patients receiving Warfarin.   Chromogenic Factor 10 levels below 20% indicate an INR greater than 3 and   levels above 40% indicate an INR less than 2.       Factor 2 Assay   Date Value Ref Range Status   07/21/2014 25 (L) 60 - 140 % Final     Comment:     Analyte Specific Reagents (ASRs) are used in many laboratory tests necessary   for   standard medical care and generally do not require FDA approval.  This test   was   developed and its performance characteristics determined by [facility].  It has not been cleared or approved by   the US Food and Drug Administration.         ASSESSMENT / PLAN  INR assessment SUB    Recheck INR In: 2 DAYS    INR Location Homecare INR      Anticoagulation Summary as of 4/24/2018     INR goal    Prior goal 1.8-2.5   Today's INR 1.6!   Maintenance plan No maintenance plan   Full instructions 4/24: 4.5 mg; 4/25: 4.5 mg   Weekly total 25.5 mg   Plan last modified Blanca Bah RN (4/5/2018)   Next INR check 4/26/2018   Priority INR   Target end date Indefinite    Indications   LVAD (left ventricular assist device) present [Z95.811]  Long-term (current) use of anticoagulants [Z79.01] [Z79.01]         Anticoagulation Episode Summary     INR check location     Preferred lab     Send INR reminders to [facility]    Comments Goal Range is  1.8-2.3  [facility] comes out to draw INR  Yancy @ [phone]  Daughter Almaz Ph [phone]      Anticoagulation Care Providers     Provider Role Specialty Phone number    Evon Lemons MD Responsible Cardiology [phone]            See the Encounter Report to view Anticoagulation Flowsheet and Dosing Calendar (Go to Encounters tab in chart review, and find the Anticoagulation Therapy Visit)    Spoke with Daisy MCKINNON.  She can see him again 4/26/18 and will check INR at that time.      Sarah Osborne RN

## 2018-04-26 NOTE — MR AVS SNAPSHOT
Sohan [facility]   4/26/2018   Anticoagulation Therapy Visit    Description:  68 year old male   Provider:  Amy Godinez, RN   Department:  [facility]           INR as of 4/26/2018     Today's INR 2.1      Anticoagulation Summary as of 4/26/2018     INR goal    Prior goal 1.8-2.5   Today's INR 2.1   Full instructions 4/26: 3 mg; 4/27: 3 mg; 4/28: 4.5 mg; 4/29: 3 mg   Next INR check 4/30/2018    Indications   LVAD (left ventricular assist device) present [Z95.811]  Long-term (current) use of anticoagulants [Z79.01] [Z79.01]         April 2018 Details    Sun Mon Tue Wed Thu Fri Sat     1               2               3               4               5               6               7                 8               9               10               11               12               13               14                 15               16               17               18               19               20               21                 22               23               24               25               26      3 mg   See details      27      3 mg         28      4.5 mg           29      3 mg         30                  Date Details   04/26 This INR check       Date of next INR:  4/30/2018         How to take your warfarin dose     To take:  3 mg Take 1 of the 3 mg tablets.    To take:  4.5 mg Take 1.5 of the 3 mg tablets.

## 2018-04-26 NOTE — PROGRESS NOTES
ANTICOAGULATION FOLLOW-UP CLINIC VISIT    Patient Name:  Sohan Rendon  Date:  4/26/2018  Contact Type:  Telephone    SUBJECTIVE:     Patient Findings     Positives No Problem Findings           OBJECTIVE    INR Protime   Date Value Ref Range Status   04/26/2018 2.1 (A) 0.86 - 1.14 Final     Chromogenic Factor 10   Date Value Ref Range Status   08/10/2014 24 (L) 70 - 130 % Final     Comment:     Therapeutic Range:  A Chromogenic Factor 10 level of approximately 20-40%   inversely correlates with an INR of 2-3 for patients receiving Warfarin.   Chromogenic Factor 10 levels below 20% indicate an INR greater than 3 and   levels above 40% indicate an INR less than 2.       Factor 2 Assay   Date Value Ref Range Status   07/21/2014 25 (L) 60 - 140 % Final     Comment:     Analyte Specific Reagents (ASRs) are used in many laboratory tests necessary   for   standard medical care and generally do not require FDA approval.  This test   was   developed and its performance characteristics determined by [facility].  It has not been cleared or approved by   the [place] Food and Drug Administration.         ASSESSMENT / PLAN  INR assessment THER    Recheck INR In: 4 DAYS    [facility]R Location [facility]      Anticoagulation Summary as of 4/26/2018     INR goal    Prior goal 1.8-2.5   Today's INR 2.1   Maintenance plan No maintenance plan   Full instructions 4/26: 3 mg; 4/27: 3 mg; 4/28: 4.5 mg; 4/29: 3 mg   Weekly total 25.5 mg   Plan last modified Blanca Bah RN (4/5/2018)   Next INR check 4/30/2018   Priority INR   Target end date Indefinite    Indications   LVAD (left ventricular assist device) present [Z95.811]  Long-term (current) use of anticoagulants [Z79.01] [Z79.01]         Anticoagulation Episode Summary     INR check location     Preferred lab     [facility]    Comments Goal Range is 1.8-2.3  [facility] comes out to draw INR   Yancy @ [phone]  Daughter Almaz Santiago [phone]      Anticoagulation Care Providers     Provider Role Specialty Phone number    Evon Lemons MD Responsible Cardiology [phone]            See the Encounter Report to view Anticoagulation Flowsheet and Dosing Calendar (Go to Encounters tab in chart review, and find the Anticoagulation Therapy Visit)    Spoke with home care nurse Yen Godinez RN

## 2018-04-28 NOTE — TELEPHONE ENCOUNTER
D:  Pt's daughter, Almaz, called to ask about coumadin dose for the weekend.  The home care nurse had set up medications through the weekend, but the container spilled and they were unsure what the dose was.  I:  After reviewing the chart, I clarified that the MTM instructions were that he should take warfarin 3 mg tonight, then 4.5 mg tomorrow, then 3 mg on Sunday.  She reported that they have 3 mg tablets, so will give him one tonight, 1 1/2 tomorrow, and 1 on Sunday.    A:  Stable but needed information about dosing.   P:  Follow per plan.

## 2018-04-30 NOTE — MR AVS SNAPSHOT
Sohan [facility]   4/30/2018   Anticoagulation Therapy Visit    Description:  68 year old male   Provider:  Dafne Sanders RN   Department:  [facility]           INR as of 4/30/2018     Today's INR 2.8!      Anticoagulation Summary as of 4/30/2018     INR goal    Prior goal 1.8-2.5   Today's INR 2.8!   Full instructions 4/30: 3 mg; 5/1: 3 mg; 5/2: 4.5 mg; 5/3: 3 mg; 5/4: 3 mg; 5/5: 4.5 mg; 5/6: 3 mg   Next INR check 5/7/2018    Indications   LVAD (left ventricular assist device) present [Z95.811]  Long-term (current) use of anticoagulants [Z79.01] [Z79.01]         April 2018 Details    Sun Mon Tue Wed Thu Fri Sat     1               2               3               4               5               6               7                 8               9               10               11               12               13               14                 15               16               17               18               19               20               21                 22               23               24               25               26               27               28                 29               30      3 mg   See details            Date Details   04/30 This INR check               How to take your warfarin dose     To take:  3 mg Take 1 of the 3 mg tablets.           May 2018 Details    Sun Mon Tue Wed Thu Fri Sat       1      3 mg         2      4.5 mg         3      3 mg         4      3 mg         5      4.5 mg           6      3 mg         7            8               9               10               11               12                 13               14               15               16               17               18               19                 20               21               22               23               24               25               26                 27               28               29               30               31                  Date Details   No additional  details    Date of next INR:  5/7/2018         How to take your warfarin dose     To take:  3 mg Take 1 of the 3 mg tablets.    To take:  4.5 mg Take 1.5 of the 3 mg tablets.

## 2018-04-30 NOTE — PROGRESS NOTES
ANTICOAGULATION FOLLOW-UP CLINIC VISIT    Patient Name:  Sohan Rendon  Date:  4/30/2018  Contact Type:  Telephone    SUBJECTIVE:     Patient Findings     Positives OTC meds (Taking Tylenol occassionally .)           OBJECTIVE    INR   Date Value Ref Range Status   04/30/2018 2.8  Final     Chromogenic Factor 10   Date Value Ref Range Status   08/10/2014 24 (L) 70 - 130 % Final     Comment:     Therapeutic Range:  A Chromogenic Factor 10 level of approximately 20-40%   inversely correlates with an INR of 2-3 for patients receiving Warfarin.   Chromogenic Factor 10 levels below 20% indicate an INR greater than 3 and   levels above 40% indicate an INR less than 2.       Factor 2 Assay   Date Value Ref Range Status   07/21/2014 25 (L) 60 - 140 % Final     Comment:     Analyte Specific Reagents (ASRs) are used in many laboratory tests necessary   for   standard medical care and generally do not require FDA approval.  This test   was   developed and its performance characteristics determined by [facility].  It has not been cleared or approved by   the [place] Food and Drug Administration.         ASSESSMENT / PLAN  INR assessment SUPRA    Recheck INR In: 1 WEEK    [facility]R Location [facility]      Anticoagulation Summary as of 4/30/2018     INR goal    Prior goal 1.8-2.5   Today's INR 2.8!   Maintenance plan No maintenance plan   Full instructions 4/30: 3 mg; 5/1: 3 mg; 5/2: 4.5 mg; 5/3: 3 mg; 5/4: 3 mg; 5/5: 4.5 mg; 5/6: 3 mg   Weekly total 25.5 mg   Plan last modified Blanca Bah RN (4/5/2018)   Next INR check 5/7/2018   Priority INR   Target end date Indefinite    Indications   LVAD (left ventricular assist device) present [Z95.811]  Long-term (current) use of anticoagulants [Z79.01] [Z79.01]         Anticoagulation Episode Summary     INR check location     Preferred lab     Send INR reminders to [facility]    Comments Goal Range is 1.8-2.3  [facility] comes  out to draw INR  Yancy @ [phone]  Daughter Almaz Ph [phone]      Anticoagulation Care Providers     Provider Role Specialty Phone number    Evon Lemons MD Responsible Cardiology [phone]            See the Encounter Report to view Anticoagulation Flowsheet and Dosing Calendar (Go to Encounters tab in chart review, and find the Anticoagulation Therapy Visit)    Spoke with [facility] nurse.    Dafne Sanders RN

## 2018-05-01 PROBLEM — K85.90 PANCREATITIS: Status: ACTIVE | Noted: 2018-01-01

## 2018-05-01 NOTE — IP AVS SNAPSHOT
MRN:[id number]                      After Visit Summary   5/1/2018    Sohan Rendon    MRN: [id number]           Thank you!     Thank you for choosing [facility] for your care. Our goal is always to provide you with excellent care. Hearing back from our patients is one way we can continue to improve our services. Please take a few minutes to complete the written survey that you may receive in the mail after you visit with us. Thank you!        Patient Information     Date Of Birth          1950        Designated Caregiver       Most Recent Value    Caregiver    Will someone help with your care after discharge? yes    Name of designated caregiver Almaz Rendon [daughter]    Phone number of caregiver [phone]    Caregiver address [place], [place]      About your hospital stay     You were admitted on:  May 1, 2018 You last received care in the:  [address] [facility]    You were discharged on:  May 3, 2018        Reason for your hospital stay       Dear Mr. Rendon - You were in the hospital for abdominal discomfort and pink urine. Your abdominal discomfort improved without specific treatments, and may have been due to irritation of the stomach. Please continue taking pantoprazole, as it may help with these symptoms in the future. The pink urine was caused by effects of your LVAD on your red blood cells, and was not dangerous. At the time you went home, your urine color had returned to normal.                  Who to Call     For medical emergencies, please call 911.  For non-urgent questions about your medical care, please call your primary care provider or clinic, [phone]          Attending Provider     Provider Specialty    Mariaelena Martinez MD Emergency Medicine    Franki Santizo MD Internal Medicine       Primary Care Provider Office Phone # Fax #    CLAIRE Rosas [phone]       When to contact your care team       Call your primary doctor if you have any of  the following:  increased shortness of breath, chest pain, red urine or difficulty urinating.                  After Care Instructions     Activity       Your activity upon discharge: activity as tolerated with assistance for transfers            Diet       Follow this diet upon discharge: Low sodium/low salt diet.            Discharge Instructions       You will be given a dose of coumadin in the hospital on Thursday May 3.   Please resume your home coumadin dose on Friday May 4.                  Follow-up Appointments     Follow Up and recommended labs and tests       - Please keep your currently scheduled appointments with your cardiologist (heart doctor) and anticoagulation clinic (coumadin clinic).  - Your anticoagulation clinic will tell you when you need INR (coumadin level) to be rechecked.   - Your primary care doctor will visit you at your home.  - Your LVAD coordinator has been updated on your hospital stay and will make sure that you have appropriate follow up appointments scheduled.                  Your next 10 appointments already scheduled     May 04, 2018 12:30 PM CDT   Lab with  LAB   [facility] ([facility])    [address]  1st Floor  [facility] [address] [phone]            May 04, 2018 12:45 PM CDT   Ventricular Assist Device with CLAIRE Escobar [facility] ([facility])    [address]  [address]  [facility] [address] [phone]              Additional Services     INR Clinic Referral           Medication Therapy Management Referral       MTM referral reason            Patient had a hospital or ED visit in last 6 months and has more than 10   PTA or Discharge medications    Patient has 5 PTA or Discharge Medications AND one of the following   diagnoses: DM,HF,COPD,AMI DX,PULM HTN       This service is designed to help you get the most from your medications.  A specially trained  "pharmacist will work closely with you and your doctors  to solve any problems related to your medications and to help you get the   best results from taking them.      The Medication Therapy Management staff will call you to schedule an appointment.                  Pending Results     Date and Time Order Name Status Description    2018 0330 Myoglobin quantitative urine In process             Statement of Approval     Ordered          18 1519  I have reviewed and agree with all the recommendations and orders detailed in this document.  EFFECTIVE NOW     Approved and electronically signed by:  Holland Chavira MD             Admission Information     Date & Time Provider Department Dept. Phone    2018 Franki Santizo MD [address] [facility] [phone]      Your Vitals Were     Blood Pressure Temperature Respirations Weight Pulse Oximetry BMI (Body Mass Index)    97/81 (BP Location: Right arm) 97.9  F (36.6  C) (Oral) 16 86.8 kg (191 lb 5.8 oz) 97% 29.1 kg/m2      MyChart Information     [employer] lets you send messages to your doctor, view your test results, renew your prescriptions, schedule appointments and more. To sign up, go to [email or web address] . Click on \"Log in\" on the left side of the screen, which will take you to the Welcome page. Then click on \"Sign up Now\" on the right side of the page.     You will be asked to enter the access code listed below, as well as some personal information. Please follow the directions to create your username and password.     Your access code is: [email or web address]  Expires: 2018  3:27 PM     Your access code will  in 90 days. If you need help or a new code, please call your [facility] clinic or [phone].        Care EveryWhere ID     This is your Care EveryWhere ID. This could be used by other organizations to access your [facility] medical records  [id number]        Equal Access to Services     ALCON MUNOZ: Rocky Rahman, " waaxda luqadaha, qaybta kaalmada rose, willie trevinoaan ah. So [facility] [phone].    ATENCIÓN: Si mel villagomez, tiene a smith disposición servicios gratuitos de asistencia lingüística. Dayron al [phone].    We comply with applicable federal civil rights laws and [place] laws. We do not discriminate on the basis of race, color, national origin, age, disability, sex, sexual orientation, or gender identity.               Review of your medicines      CONTINUE these medicines which may have CHANGED, or have new prescriptions. If we are uncertain of the size of tablets/capsules you have at home, strength may be listed as something that might have changed.        Dose / Directions    ketotifen 0.025 % Soln ophthalmic solution   Commonly known as:  ZADITOR/REFRESH ANTI-ITCH   This may have changed:  when to take this   Used for:  Allergic conjunctivitis, bilateral        Dose:  1 drop   Place 1 drop into both eyes 2 times daily   Quantity:  1 Bottle   Refills:  11         CONTINUE these medicines which have NOT CHANGED        Dose / Directions    acetaminophen 325 MG tablet   Commonly known as:  TYLENOL        Dose:  650 mg   Take 2 tablets (650 mg) by mouth every 6 hours as needed for mild pain   Quantity:  100 tablet   Refills:  0       ALEK CONTOUR test strip   Used for:  Hypoglycemia   Generic drug:  blood glucose monitoring        USE TO TEST BLOOD SUGAR 2 TIMES DAILY OR AS DIRECTED.   Quantity:  100 strip   Refills:  2       bisacodyl 10 MG Suppository   Commonly known as:  DULCOLAX   Used for:  Drug-induced constipation        Dose:  10 mg   Place 1 suppository (10 mg) rectally daily as needed for constipation   Quantity:  5 suppository   Refills:  1       * blood glucose monitoring lancets   Used for:  Diabetes mellitus, type 2 (H)        Use to test blood sugars 2 times daily or as directed.   Quantity:  1 Box   Refills:  3       * blood glucose monitoring lancets   Used for:   Diabetes mellitus, type 2 (H)        USE TO TEST BLOOD SUGAR 2 TIMES DAILY OR AS DIRECTED   Quantity:  100 each   Refills:  2       finasteride 5 MG tablet   Commonly known as:  PROSCAR   Used for:  Incomplete bladder emptying        Dose:  5 mg   Take 1 tablet (5 mg) by mouth daily   Quantity:  90 tablet   Refills:  3       furosemide 20 MG tablet   Commonly known as:  LASIX   Used for:  Chronic systolic congestive heart failure (H)        Dose:  20 mg   Take 1 tablet (20 mg) by mouth as needed   Quantity:  20 tablet   Refills:  11       ipratropium - albuterol 0.5 mg/2.5 mg/3 mL 0.5-2.5 (3) MG/3ML neb solution   Commonly known as:  DUONEB   Used for:  Pulmonary atelectasis        Dose:  1 vial   Take 1 vial (3 mLs) by nebulization every 6 hours as needed for shortness of breath / dyspnea, wheezing or other   Quantity:  360 mL   Refills:  11       levETIRAcetam 750 MG tablet   Commonly known as:  KEPPRA   Used for:  Convulsions, unspecified convulsion type (H)        TAKE 1 TABLET (750 MG) BY MOUTH 2 TIMES DAILY   Quantity:  180 tablet   Refills:  3       loratadine 10 MG tablet   Commonly known as:  CLARITIN   Used for:  Allergic rhinitis        TAKE 1 TABLET (10 MG) BY MOUTH DAILY   Quantity:  90 tablet   Refills:  2       losartan 25 MG tablet   Commonly known as:  COZAAR   Used for:  Chronic systolic congestive heart failure (H)        Dose:  25 mg   Take 1 tablet (25 mg) by mouth daily   Quantity:  15 tablet   Refills:  3       MELATONIN PO        Dose:  5 mg   Take 5 mg by mouth At Bedtime   Refills:  0       nitroGLYcerin 0.4 MG sublingual tablet   Commonly known as:  NITROSTAT   Used for:  Coronary artery disease involving native coronary artery with unstable angina pectoris (H)        Dose:  0.4 mg   Place 1 tablet (0.4 mg) under the tongue every 5 minutes as needed for chest pain   Quantity:  30 tablet   Refills:  1       nystatin 861419 UNIT/GM Powd   Commonly known as:  MYCOSTATIN   Used for:   Intertriginous dermatitis associated with moisture        Apply to affected areas of skin in the groin and on the scrotum three times a day as needed.   Quantity:  60 g   Refills:  3       order for DME   Used for:  Fracture of femoral neck, right, closed, initial encounter, Stroke (H), CHF (congestive heart failure), NYHA class IV (H)        Equipment being ordered: Lift chair.  Please see indications and face-to-face encounter details in 2/3/15 Office Visit note.   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   Quantity:  1 each   Refills:  0       * order for DME   Used for:  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), Displaced fracture of right femoral neck, closed, with nonunion, subsequent encounter        Equipment being ordered: Wheelchair, manual,  with elevated leg rests and tilt in space back.  Please fax to [facility]; I called them 11/26/16 and they requested the new order.  Face to face is in my 10/26/16 note.   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   Quantity:  2 each   Refills:  0       * order for DME   Used for:  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)        Bilateral heel protective cushioning boots.  Dx: previous stroke with left hemiplegia.  Duration of need: 99 months.   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, subsequent encounter        Dose:  5 mg   Take 1 tablet (5 mg) by mouth every 4 hours as needed for moderate to severe pain   Quantity:  30 tablet   Refills:  0       PANTOPRAZOLE SODIUM PO        Dose:  20 mg   Take 20 mg by mouth every morning (before breakfast)   Refills:  0       senna-docusate 8.6-50 MG per tablet   Commonly known as:  SENEXON-S   Used for:  Other constipation        Dose:  2 tablet   Take 2 tablets by mouth 2 times daily as needed for constipation   Quantity:  60 tablet   Refills:  1       simethicone 80 MG chewable tablet   Commonly known as:  MYLICON   Used for:  Slow transit constipation        Dose:  80 mg   Take 1 tablet (80 mg) by mouth 4 times daily   Quantity:  180 tablet   Refills:  5       tamsulosin 0.4 MG capsule   Commonly known as:  FLOMAX   Used for:  Incomplete bladder emptying        Dose:  0.8 mg   Take 2 capsules (0.8 mg) by mouth daily   Quantity:  60 capsule   Refills:  11       thiamine 100 MG tablet   Used for:  Cerebrovascular accident (CVA) due to embolism of  right middle cerebral artery (H)        TAKE 1 TABLET (100 MG) BY MOUTH DAILY   Quantity:  90 tablet   Refills:  3       TOPROL XL PO        Take 50mg by mouth every morning. Take 25mg by mouth every evening.   Refills:  0       warfarin 1 MG tablet   Commonly known as:  COUMADIN   Used for:  LVAD (left ventricular assist device) present (H)        Dose:  2.5 mg   Take 2.5 tablets (2.5 mg) by mouth daily   Quantity:  30 tablet   Refills:  1       * Notice:  This list has 9 medication(s) that are the same as other medications prescribed for you. Read the directions carefully, and ask your doctor or other care provider to review them with you.             Protect others around you: Learn how to safely use, store and throw away your medicines at [email or web address].             Medication List: This is a list of all your medications and when to take them. Check marks below indicate your daily home schedule. Keep this list as a reference.      Medications           Morning Afternoon Evening Bedtime As Needed    acetaminophen 325 MG tablet   Commonly known as:  TYLENOL   Take 2 tablets (650 mg) by mouth every 6 hours as needed for mild pain   Last time this was given:  325 mg on 5/2/2018 11:19 PM                                ALEK CONTOUR test strip   USE TO TEST BLOOD SUGAR 2 TIMES DAILY OR AS DIRECTED.   Generic drug:  blood glucose monitoring                                bisacodyl 10 MG Suppository   Commonly known as:  DULCOLAX   Place 1 suppository (10 mg) rectally daily as needed for constipation                                * blood glucose monitoring lancets   Use to test blood sugars 2 times daily or as directed.                                * blood glucose monitoring lancets   USE TO TEST BLOOD SUGAR 2 TIMES DAILY OR AS DIRECTED                                finasteride 5 MG tablet   Commonly known as:  PROSCAR   Take 1 tablet (5 mg) by mouth daily   Last time this was given:  5 mg on 5/3/2018  8:43  AM                                furosemide 20 MG tablet   Commonly known as:  LASIX   Take 1 tablet (20 mg) by mouth as needed                                ipratropium - albuterol 0.5 mg/2.5 mg/3 mL 0.5-2.5 (3) MG/3ML neb solution   Commonly known as:  DUONEB   Take 1 vial (3 mLs) by nebulization every 6 hours as needed for shortness of breath / dyspnea, wheezing or other                                ketotifen 0.025 % Soln ophthalmic solution   Commonly known as:  ZADITOR/REFRESH ANTI-ITCH   Place 1 drop into both eyes 2 times daily   Last time this was given:  1 drop on 5/3/2018  8:46 AM                                levETIRAcetam 750 MG tablet   Commonly known as:  KEPPRA   TAKE 1 TABLET (750 MG) BY MOUTH 2 TIMES DAILY   Last time this was given:  750 mg on 5/3/2018  8:44 AM                                loratadine 10 MG tablet   Commonly known as:  CLARITIN   TAKE 1 TABLET (10 MG) BY MOUTH DAILY   Last time this was given:  10 mg on 5/3/2018  8:43 AM                                losartan 25 MG tablet   Commonly known as:  COZAAR   Take 1 tablet (25 mg) by mouth daily   Last time this was given:  25 mg on 5/2/2018  3:42 PM                                MELATONIN PO   Take 5 mg by mouth At Bedtime   Last time this was given:  5 mg on 5/2/2018  9:34 PM                                nitroGLYcerin 0.4 MG sublingual tablet   Commonly known as:  NITROSTAT   Place 1 tablet (0.4 mg) under the tongue every 5 minutes as needed for chest pain                                nystatin 198769 UNIT/GM Powd   Commonly known as:  MYCOSTATIN   Apply to affected areas of skin in the groin and on the scrotum three times a day as needed.                                order for DME   Equipment being ordered: Lift chair.  Please see indications and face-to-face encounter details in 2/3/15 Office Visit note.                                * order for DME   Equipment being ordered: Bilateral leg supports for manual wheelchair.                                 * order for DME   Equipment being ordered: Reclining manual w/c with bilateral elevating leg rests.                                * order for DME   Equipment being ordered: Hospital Bed, fully electric.                                * order for DME   Equipment being ordered: Wheelchair, manual.                                * order for DME   Equipment being ordered: Wheelchair, manual, with elevated leg rests and tilt in space back.  Please fax to [facility]; I called them 11/26/16 and they requested the new order.  Face to face is in my 10/26/16 note.                                * order for DME   Equipment being ordered: Cushioned heel boots.                                * order for DME   Bilateral heel protective cushioning boots.  Dx: previous stroke with left hemiplegia.  Duration of need: 99 months.                                order for DME   Equipment being ordered: Nebulizer                                oxyCODONE IR 5 MG tablet   Commonly known as:  ROXICODONE   Take 1 tablet (5 mg) by mouth every 4 hours as needed for moderate to severe pain                                PANTOPRAZOLE SODIUM PO   Take 20 mg by mouth every morning (before breakfast)   Last time this was given:  20 mg on 5/3/2018  8:43 AM                                senna-docusate 8.6-50 MG per tablet   Commonly known as:  SENEXON-S   Take 2 tablets by mouth 2 times daily as needed for constipation   Last time this was given:  2 tablets on 5/3/2018 10:44 AM                                simethicone 80 MG chewable tablet   Commonly known as:  MYLICON   Take 1 tablet (80 mg) by mouth 4 times daily   Last time this was given:  80 mg on 5/3/2018 10:44 AM                                tamsulosin 0.4 MG capsule   Commonly known as:  FLOMAX   Take 2 capsules (0.8 mg) by mouth daily   Last time this was given:  0.8 mg on 5/3/2018  8:43 AM                                thiamine 100 MG tablet   TAKE 1  TABLET (100 MG) BY MOUTH DAILY   Last time this was given:  100 mg on 5/3/2018  8:43 AM                                TOPROL XL PO   Take 50mg by mouth every morning. Take 25mg by mouth every evening.   Last time this was given:  25 mg on 5/2/2018  9:34 PM                                warfarin 1 MG tablet   Commonly known as:  COUMADIN   Take 2.5 tablets (2.5 mg) by mouth daily   Last time this was given:  1 mg on 5/3/2018  2:59 PM                                * Notice:  This list has 9 medication(s) that are the same as other medications prescribed for you. Read the directions carefully, and ask your doctor or other care provider to review them with you.

## 2018-05-01 NOTE — ED PROVIDER NOTES
[place] EMERGENCY DEPARTMENT ([facility])  5/01/18   History     Chief Complaint   Patient presents with     Abdominal Pain     Hematuria     The history is provided by a relative and the patient. A  was used (Honduran (Relative Interpreted)).     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  The patient yesterday began having difficulty urinating and began noticing that his urine was quite dark.  The patient's urine got progressively darker throughout the night.  He also began having LUQ and epigastric abdominal pain yesterday.  He was also having difficulty urinating and was not feeling that he was completely emptying his bladder with urination.  He denies feeling the need to urinate again immediately after urination.  The patient states that he was not having burning with urination yesterday or today; however, was having some pain in his lower pelvis region as he was straining to urinate. He has not noted timbo blood or blood clots in the urine. The patient's relative states that the patient has not passed any clots in his urine.  The patient is currently on Coumadin and has an LVAD in place.  The patient had some nausea today, but had no episodes of vomiting.  The patient is not currently having the abdominal pain.  The patient does note that he was having some dizziness as well yesterday and today.  The patient's relative states that the patient has not had any past abdominal surgeries.  The patient denies any fevers, diarrhea, cough, cold-like symptoms, runny nose, sore throat, chest pain or shortness of breath.  The relative reports that the patient's  "last bowel movement was yesterday and was normal, she denies seeing any blood in the patient's stool.  The patient did note that he had a \"itchiness\" in his throat and nose, but he denies any pain.    Patient reports no prior abdominal surgeries with the exception of the driveline placement for his LVAD    I have reviewed the Medications, Allergies, Past Medical and Surgical History, and Social History in the [facility] system.    Past Medical History:   Diagnosis Date     Acute right MCA stroke (H) 6/2013    With L sided hemiparesis      Anemia of chronic disease     Baseline Hb 8-9     BPH (benign prostatic hyperplasia)      CHF (congestive heart failure), NYHA class IV (H) 10/9/2012    s/p HeartMate II.  Was on milrinone and dobutamine prior to LVAD      CKD (chronic kidney disease)     Baseline Cr=     Clostridium difficile colitis 12/2012      Dysphagia     PEG tube placed in 2012.  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  Normal carotid duplex 3/2014.       Esophagitis 12/2012    EGD with esophagitis and multiple douenal ulcers     Fracture of femoral neck, right, closed (H) 2/3/2015    Presumed pathologic as patient is non-weight-bearing and suffered no trauma.  Family declined operative repair during hospital stay 1/23 - 1/30/15.     Gastric and duodenal angiodysplasia with hemorrhage 6/18/2015     GERD (gastroesophageal reflux disease)      Gout      Hematuria      HTN (hypertension)     LDL=59 (3/29/2014)     Hyperlipaemia      Ischemic cardiomyopathy      Mitral regurgitation     s/p MVR with Carbomedics ring      Myocardial infarction 1998    In [facility] without intervention      Nonsenile cataract     BE     PFO (patent foramen ovale)     s/p closure (10/9/2012)     TB lung, latent     s/p 9 months INH in 2012        Past Surgical History:   Procedure Laterality Date     C CABG, VEIN, FIVE  2001     CATARACT IOL, " RT/LT Right 11/19/2015     ENDOSCOPIC RETROGRADE CHOLANGIOPANCREATOGRAM N/A 5/9/2017    Procedure: COMBINED ENDOSCOPIC RETROGRADE CHOLANGIOPANCREATOGRAPHY, PLACE TUBE/STENT;  Endoscopic Retrograde Cholangiopancreatogram, Stent (Zimmon Biliary 7fr 12cm) Placement;  Surgeon: Cristo Grove MD;  Location: [facility] OR     ESOPHAGOSCOPY, GASTROSCOPY, DUODENOSCOPY (EGD), COMBINED N/A 6/10/2015    Procedure: COMBINED ESOPHAGOSCOPY, GASTROSCOPY, DUODENOSCOPY (EGD);  Surgeon: Edu Jenkins MD;  Location: [facility] GI     ESOPHAGOSCOPY, GASTROSCOPY, DUODENOSCOPY (EGD), COMBINED N/A 6/15/2015    Procedure: COMBINED ESOPHAGOSCOPY, GASTROSCOPY, DUODENOSCOPY (EGD);  Surgeon: Yury Bonner MD;  Location: [facility] OR     H INSERT ICD  2/10/2011    And pacemaker.  Not BiV     INSERT VENTRICULAR ASSIST DEVICE LEFT (HEARTMATE II)  10/9/2012     IR GASTRO JEJUNOSTOMY TUBE PLACEMENT       PHACOEMULSIFICATION CLEAR CORNEA WITH STANDARD INTRAOCULAR LENS IMPLANT Right 11/19/2015    Procedure: PHACOEMULSIFICATION CLEAR CORNEA WITH STANDARD INTRAOCULAR LENS IMPLANT;  Surgeon: Violeta Cosme MD;  Location: [facility] OR     REPAIR PATENT FORAMEN OVALE  10/9/2012     REPAIR VALVE MITRAL  2/9/2012    28 mm Carbomedics 2/3 ring        Family History   Problem Relation Age of Onset     Family History Negative No family hx of      Glaucoma No family hx of      Macular Degeneration No family hx of      CANCER No family hx of      no skin cancer       Social History   Substance Use Topics     Smoking status: Former Smoker     Smokeless tobacco: Former User     Alcohol use No       Current Facility-Administered Medications   Medication     lactated ringers BOLUS 500 mL        Allergies   Allergen Reactions     Seasonal Allergies          Review of Systems   Constitutional: Negative for fever.   HENT: Negative for congestion, rhinorrhea and sore throat.    Respiratory: Negative for cough and shortness of breath.    Cardiovascular: Negative for chest pain.    Gastrointestinal: Positive for abdominal pain (LUQ) and nausea. Negative for blood in stool, constipation, diarrhea and vomiting.   Genitourinary: Positive for difficulty urinating and hematuria. Negative for dysuria and frequency.        Positive for pain in lower pelvic region while straining to urinate  Positive for not feeling he is emptying bladder   All other systems reviewed and are negative.      Physical Exam   BP: 104/90  Heart Rate: 86  Temp: 98.4  F (36.9  C)  Resp: 16  SpO2: 98 %      Physical Exam   Constitutional: No distress.   Elderly [place]vian male, lying back in bed, minimal distress   HENT:   Head: Normocephalic and atraumatic.   Mouth/Throat: Oropharynx is clear and moist. No oropharyngeal exudate.   Eyes: Pupils are equal, round, and reactive to light. No scleral icterus.   Cardiovascular: Normal rate.    LVAD whirr   Pulmonary/Chest: Effort normal. No respiratory distress.   No acute respiratory distress.  Somewhat diminished breath sounds B. LVAD whirr obscures most breath sounds   Abdominal: Soft. Bowel sounds are normal. There is tenderness. There is no rebound.   Obese, soft, some TTP in epigastrium and suprapubic region.  No rebound.   Musculoskeletal: He exhibits no edema or tenderness.   Skin: Skin is warm. No rash noted. He is not diaphoretic.   Nursing note and vitals reviewed.      ED Course   6:16 PM  The patient was seen and examined by Mariaelena Martinez MD in Room ED12.     ED Course     Procedures             Critical Care time:  none             Results for orders placed or performed during the hospital encounter of 05/01/18 (from the past 24 hour(s))   EKG 12 lead   Result Value Ref Range    Interpretation ECG Click View Image link to view waveform and result    CBC with platelets differential   Result Value Ref Range    WBC 7.2 4.0 - 11.0 10e9/L    RBC Count 3.58 (L) 4.4 - 5.9 10e12/L    Hemoglobin 9.9 (L) 13.3 - 17.7 g/dL    Hematocrit 31.7 (L) 40.0 - 53.0 %    MCV 89 78 - 100  fl    MCH 27.7 26.5 - 33.0 pg    MCHC 31.2 (L) 31.5 - 36.5 g/dL    RDW 21.2 (H) 10.0 - 15.0 %    Platelet Count 175 150 - 450 10e9/L    Diff Method Automated Method     % Neutrophils 64.2 %    % Lymphocytes 11.9 %    % Monocytes 13.7 %    % Eosinophils 9.0 %    % Basophils 0.4 %    % Immature Granulocytes 0.8 %    Nucleated RBCs 0 0 /100    Absolute Neutrophil 4.7 1.6 - 8.3 10e9/L    Absolute Lymphocytes 0.9 0.8 - 5.3 10e9/L    Absolute Monocytes 1.0 0.0 - 1.3 10e9/L    Absolute Eosinophils 0.7 0.0 - 0.7 10e9/L    Absolute Basophils 0.0 0.0 - 0.2 10e9/L    Abs Immature Granulocytes 0.1 0 - 0.4 10e9/L    Absolute Nucleated RBC 0.0    Comprehensive metabolic panel   Result Value Ref Range    Sodium 137 133 - 144 mmol/L    Potassium 4.2 3.4 - 5.3 mmol/L    Chloride 110 (H) 94 - 109 mmol/L    Carbon Dioxide 21 20 - 32 mmol/L    Anion Gap 6 3 - 14 mmol/L    Glucose 136 (H) 70 - 99 mg/dL    Urea Nitrogen 26 7 - 30 mg/dL    Creatinine 2.14 (H) 0.66 - 1.25 mg/dL    GFR Estimate 31 (L) >60 mL/min/1.7m2    GFR Estimate If Black 37 (L) >60 mL/min/1.7m2    Calcium 8.3 (L) 8.5 - 10.1 mg/dL    Bilirubin Total 0.9 0.2 - 1.3 mg/dL    Albumin 3.1 (L) 3.4 - 5.0 g/dL    Protein Total 7.4 6.8 - 8.8 g/dL    Alkaline Phosphatase 100 40 - 150 U/L    ALT 26 0 - 70 U/L    AST Canceled, Test credited 0 - 45 U/L   Lipase   Result Value Ref Range    Lipase 2212 (H) 73 - 393 U/L   Lactic acid whole blood   Result Value Ref Range    Lactic Acid 1.4 0.7 - 2.0 mmol/L   INR   Result Value Ref Range    INR 2.98 (H) 0.86 - 1.14   CK total   Result Value Ref Range    CK Total 175 30 - 300 U/L   UA reflex to Microscopic and Culture   Result Value Ref Range    Color Urine Yellow     Appearance Urine Slightly Cloudy     Glucose Urine Negative NEG^Negative mg/dL    Bilirubin Urine Negative NEG^Negative    Ketones Urine Negative NEG^Negative mg/dL    Specific Gravity Urine 1.003 1.003 - 1.035    Blood Urine Large (A) NEG^Negative    pH Urine 6.5 5.0 - 7.0  pH    Protein Albumin Urine 30 (A) NEG^Negative mg/dL    Urobilinogen mg/dL Normal 0.0 - 2.0 mg/dL    Nitrite Urine Negative NEG^Negative    Leukocyte Esterase Urine Negative NEG^Negative    Source Catheterized Urine     RBC Urine <1 0 - 2 /HPF    WBC Urine 0 0 - 5 /HPF    Bacteria Urine Few (A) NEG^Negative /HPF    Squamous Epithelial /HPF Urine <1 0 - 1 /HPF    Transitional Epi <1 0 - 1 /HPF    Mucous Urine Present (A) NEG^Negative /LPF    Amorphous Crystals Few (A) NEG^Negative /HPF   Abdomen US, limited (RUQ only)    Narrative    EXAMINATION: Limited Abdominal Ultrasound, 5/1/2018 7:45 PM     COMPARISON: CT 3/18/2018, ultrasound 5/7/2017    HISTORY: Acute pancreatitis, evaluate for gallstones/obstruction    FINDINGS:   Fluid: No evidence of ascites or pleural effusions.    Liver: The liver demonstrates normal echotexture, measuring 12.6 cm in  craniocaudal dimension. There is no focal mass. The left lobe is not  well seen due to overlying left ventricular assist device and  dressings.    Gallbladder: Cholecystectomy.    Bile Ducts: No evidence of intrahepatic biliary dilatation.  The  common bile duct measures 6 mm in diameter. The distal duct is not  well seen. No evidence of filling defect. The stent is not well seen.    Pancreas: Poorly visualized.    Kidney: The right kidney measures 9.9 cm long. There is no  hydronephrosis or hydroureter, no shadowing renal calculi, or solid  mass. There is a 3.7 cm simple cyst of the upper pole, not  significantly changed from previous imaging.       Impression    IMPRESSION:   1.  No intrahepatic or extrahepatic biliary dilatation or evidence of  choledocholithiasis.  2.  Exam is somewhat limited by left ventricular assist device and  dressings, making visualization of the pancreas, distal common bile  duct, and left hepatic lobe difficult.    I have personally reviewed the examination and initial interpretation  and I agree with the findings.    HUNTER SEPULVEDA MD   CK  total   Result Value Ref Range    CK Total 165 30 - 300 U/L   Lactate Dehydrogenase   Result Value Ref Range    Lactate Dehydrogenase Canceled, Test credited 85 - 227 U/L   CT Abdomen Pelvis w/o Contrast    Narrative    EXAMINATION: TEMPORARY, 5/1/2018 10:08 PM    TECHNIQUE:  Helical CT images from the lung bases through the  symphysis pubis were obtained  without IV contrast.     COMPARISON: CT abdomen/pelvis 3/18/2018    HISTORY: Acute pancreatitis, right upper quadrant ultrasound shows no  gallstones, patient has renal insufficiency with creatinine of 2.14,  please evaluate for pancreatic lesion/mass    FINDINGS:  Abdomen/pelvis: Left upper quadrant left ventricular assist device  contributes to streak artifact which partially obscures visualization  of the upper abdomen. The LVAD device and its stripe line are  unremarkable in appearance.    Fatty infiltration of the pancreas which is otherwise normal,  acknowledging limited visualization. No significant peripancreatic fat  stranding. No definite pancreatic lesion or fluid collection.  Gallbladder is surgically absent. Scattered foci of intrahepatic  pneumobilia with common bile duct stent in place. No intrahepatic or  extra hepatic biliary ductal dilatation. Lobulated appearance to the  spleen is unchanged over the course of multiple prior exams, not  enlarged. Normal adrenals. Simple fluid attenuation exophytic cysts  arising from both kidneys are not significantly changed. Asymmetric  atrophy of the right kidney, unchanged. No hydronephrosis normal  urinary bladder and prostate gland. Small sliding-type hiatal hernia.  No dilated small or large bowel. Scattered colonic diverticulosis  without diverticulitis. Normal appendix. No free air, free fluid,  pneumatosis, or portal venous gas. No abdominal aortic aneurysm.    Lung bases: Mild bibasilar atelectasis, left greater than right. No  pleural or pericardial effusion. Heart size is enlarged.  Extensive  coronary artery calcification and annular mitral valve calcification.  Partially visualized median sternotomy wires.    Bones and soft tissues: Degenerative changes in the lower lumbar  spine. Chronic right femoral subcapital fracture unchanged in  appearance. No acute-appearing or suspicious osseous lesion surgical  clips in the right groin. Diffuse muscular atrophy.      Impression    IMPRESSION:   1. Suboptimal visualization of the pancreas secondary to lack of  intravenous contrast and streak artifact from left upper quadrant left  ventricular assist device. No convincing CT findings of acute  pancreatitis or evidence of pancreatic mass lesion or fluid  collection. There is fatty infiltration and atrophy of the pancreas.   2. Stable positioning of common bile duct stent without intrahepatic  or extra hepatic biliary ductal dilatation. Gallbladder is surgically  absent.  3. Unchanged asymmetric atrophy of the right kidney and bilateral  simple renal cysts.  4. 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  5. Diverticulosis without diverticulitis.     *Note: Due to a large number of results and/or encounters for the requested time period, some results have not been displayed. A complete set of results can be found in Results Review.              Assessments & Plan (with Medical Decision Making)   This is a 68-year old male with a history of ischemic cardiomyopathy S/P LVAD placement who presents to the ED today with upper abdominal pain as well as dark urine. Apparently this started yesterday and has gotten progressively worse. The patient was recently hospitalized for a LVAD thrombus. He has maintained his anticoagulation on Coumadin.     On exam, he is lying back in bed, no obvious distress. He has some occasional spitting into an emesis bag but he is not actively vomiting. He has some mild epigastric discomfort upon palpation as well as mild suprapubic discomfort upon  palpation.    Differential diagnosis of dark urine could include dehydration, however else we also have to consider hematuria potentially from over-anticoagulation. He is a bit unclear when asked if he feels like he is emptying his bladder.     We did establish IV access and we did draw blood for laboratory analysis. CBC today shows a normal white count of 7.2, hemoglobin is 9.9 which is about the same as it was on 4/17, at his last check. Platelets are normal at 175. Lactic acid is 1.4, CMP today shows some renal insufficiency with a creatinine of 2.14 which is a little bit up from his baseline of 1.8-1.9. Electrolytes appear to be within normal limits. Lipase is 2212. UA does not show any evidence of infection. Interestingly, it shows a large amount of blood, but only less than one red cell. I have added on a CK to his labs.    I received a phone call from the lab who asked me to call the chemistry resident on call.  I did speak to the chemistry resident on-call who reports that any CK result in this patient needs to be interpreted with caution as because of his LVAD he has in vivo hemolysis at baseline.  I did verbalize that I understood this and ultimately they resulted the CK which was in fact normal.    INR was 2.98. Bladder scan showed 680ccs. Patient was able to void approximately 250 ccs, then ultimately was straight cathed for remainder, 350 ccs were emptied on straight cath. Due to his elevated lipase we elected to do a right upper quadrant ultrasound which shows no intrahepatic or extrahepatic biliary dilation or evidence of choledocholithiasis.  Note that the patient denied having had abdominal surgeries at any point in the past, and in fact has had a cholecystectomy.  Of note, the exam is somewhat limited by the LVAD device and the dressings which are located in the RUQ.    I do not have a great etiology for his acute pancreatitis at present. I will speak with the cardiology team to make arrangements  to admit him. We will likely order a non-contrast abdominal CT scan to try to evaluate the pancreas.    Patient will be admitted to the medicine service at this point for management of acute pancreatitis, please note I did speak to the cardiology 2 staff who recommends admission to medicine with a cardiology consult for his LVAD.    I have reviewed the nursing notes.    I have reviewed the findings, diagnosis, plan and need for follow up with the patient.    Current Discharge Medication List          Final diagnoses:   Idiopathic acute pancreatitis, unspecified complication status   LVAD (left ventricular assist device) present (H)   Urinary retention     I, Giovanni Hope, am serving as a trained medical scribe to document services personally performed by Mariaelena Martinez MD, based on the provider's statements to me.   I, Mariaelena Martinez MD, was physically present and have reviewed and verified the accuracy of this note documented by Giovanni Hope.    5/1/2018   [facility], [place], EMERGENCY DEPARTMENT     Mariaelena Martinez MD  05/01/18 [id number]

## 2018-05-01 NOTE — IP AVS SNAPSHOT
[address] [address]    Phone:  [phone]                                       After Visit Summary   5/1/2018    Shoan Rendon    MRN: [id number]           After Visit Summary Signature Page     I have received my discharge instructions, and my questions have been answered. I have discussed any challenges I see with this plan with the nurse or doctor.    ..........................................................................................................................................  Patient/Patient Representative Signature      ..........................................................................................................................................  Patient Representative Print Name and Relationship to Patient    ..................................................               ................................................  Date                                            Time    ..........................................................................................................................................  Reviewed by Signature/Title    ...................................................              ..............................................  Date                                                            Time

## 2018-05-01 NOTE — TELEPHONE ENCOUNTER
[facility] [facility] TELEPHONE ENCOUNTER    Chief Complaint   Patient presents with     HEMATURIA/ABDOMINAL PAIN       Sohan Rendon is a 68 year old  (1950),  Daughter called today to report: Mr. Rendon has not been feeling well.  He hardly ate or drank anything yesterday and toward the end of the day his urine was very dark.  This morning he is feeling better, is eating and drinking but urine is red.  It is clear no clots.  He did have a normal BM today.  He has some vague abdominal discomfort. He is afebrile axillary temp 96.5.  No nausea or vomiting.  His INR was 2.8 which is on the higher range of his goal.  He does have a history or ESBL.  Most of the UA/UC in the past year have been negative for infection.     ASSESSMENT/PLAN  hematuria    Encourage more fluids    Monitor for changes in temperature or increased abdominal pain    Call back with changes.    CLAIRE Poe CNP

## 2018-05-01 NOTE — PROGRESS NOTES
Pt's daughter called stating pt is having pain with urination that started yesterday. This morning, urine changed from dark brown to red. Pt is feeling well, denies dizziness, SOB. VAD parameters WNL (Power= 5.9-6 s, PI= 5.9, Flow= 5.1, speed= 8800 rpm.) Daughter stated that for a moment yesterday they saw power go to 8.1. Discussed with daughter that burning with urination and change in color could be related to a bladder infection. They follow with a PCP as well as a urologist. Daughter will call one of the two for recommendations. If she has difficulty getting assistance, if VAD parameter change, or if pt begins to feel unwell, she will page the VAD Coordinator on call again. Daughter agrees with plan. Dr. Lemons also in agreement.

## 2018-05-01 NOTE — ED NOTES
It was reported that patient had a BSV of 680 ml upon arrival. Pt. Voided ~ 250 ml independently prior to straight catheterization. Pt. Straight cathed for sterile urine sample, 350 ml emptied from bladder. Urine appeared pink/dark red. Pt. Reports relief in abdominal pain since voiding/catheterization.

## 2018-05-01 NOTE — LETTER
Transition Communication Hand-off for Care Transitions to Next Level of Care Provider    Name: Sohan Rendon  : 1950  MRN #: [id number]  Primary Care Provider: Shilpi Agosto     Primary Clinic: [address] [place] [address] 400, [facility] [address]     Reason for Hospitalization:  Urinary retention [R33.9]  LVAD (left ventricular assist device) present (H) [Z95.811]  Idiopathic acute pancreatitis, unspecified complication status [K85.00]    Admit Date/Time: 2018  4:44 PM  Discharge Date: 5/3/2018    Payor Source: Payor: UCARE / Plan: [employer] / Product Type: HMO /   Follow-up plan:  Future Appointments  Date Time Provider Department Center   2018 9:00 AM Dustin Knutson [place]   2018 12:30 PM  LAB [facility]   2018 12:45 PM Zuleika Patterson APRN CNP [facility]       Any outstanding tests or procedures:        Referrals     Future Labs/Procedures    Home care nursing referral     Comments:    [facility] -   Phone  [phone]  Fax  [phone]    Resumption of Skilled Nursing    Your provider has ordered home care nursing services. If you have not been contacted within 2 days of your discharge please call the inpatient department phone number at [phone] .    INR Clinic Referral     Medication Therapy Management Referral     Comments:    MTM referral reason            Patient had a hospital or ED visit in last 6 months and has more than 10   PTA or Discharge medications    Patient has 5 PTA or Discharge Medications AND one of the following   diagnoses: DM,HF,COPD,AMI DX,PULM HTN       This service is designed to help you get the most from your medications.  A specially trained pharmacist will work closely with you and your doctors  to solve any problems related to your medications and to help you get the   best results from taking them.      The Medication Therapy Management staff will call you to schedule an appointment.            Key  Recommendations:  Please review AVS    Kecia VELASCON RN PHN  Patient Care Management Coordinator  [facility]  Phone: [phone] / Pager: [phone]      AVS/Discharge Summary is the source of truth; this is a helpful guide for improved communication of patient story

## 2018-05-02 NOTE — PLAN OF CARE
"Problem: Patient Care Overview  Goal: Plan of Care/Patient Progress Review  Care Provided: [id number]    Neuro: A&Ox4. Egyptian speaking, family in room translating.  Cardiac: LVAD-HM 2; parameters WDL - flow not displayed on monitor, per  /80 (BP Location: Right arm)  Pulse 88  Temp 97.8  F (36.6  C) (Oral)  Resp 18  Ht 1.651 m (5' 5\")  Wt 48.6 kg (107 lb 2.3 oz)  SpO2 100%  BMI 17.83 kg/m2  Respiratory: Sating >95% on RA. No complaints of SOB. Lungs clear/diminished.  GI/: Pt retaining urine. Urinated 225cc, PVR 577cc - truong placed & UA sent. Dark red/brown urine. No BM on shift, pt passing gas.   Diet/appetite: NPO; tolerating PO meds with water.  Activity:  Baseline bedbound per family. Repositioned frequently by daughter/son at bedside.   Pain: Pt complaining of abdominal discomfort; PRN tylenol given x1.  Skin: No overt deficits noted; bruises, scars.   IV:  PIV x1 - 500cc LR bolus given on shift. Heparin drip to be started once verified by pharmacy.     R: Will continue to monitor pt closely and notify primary team with any changes.      Problem: Renal Insufficiency Comorbidity  Goal: Renal Insufficiency  Patient comorbidity will be monitored for signs and symptoms of Renal Insufficiency (Chronic) condition.  Problems will be absent, minimized or managed by discharge/transition of care.   Truong placed for urinary retention. Hematuria present.  Continue patient on flomax & monitor renal function closely.     Problem: Cardiac Disease Comorbidity  Goal: Cardiac Disease  Patient comorbidity will be monitored for signs and symptoms of Cardiac Disease.  Problems will be absent, minimized or managed by discharge/transition of care.   Tele monitored & vitals recorded Q4h. Pt with HM II LVAD & 100% paced rhythm 60bpm. Continue home PTA medications & monitor closely.       "

## 2018-05-02 NOTE — PROGRESS NOTES
General acute hospital, [place]    Internal Medicine Progress Note - Gold Service      Assessment & Plan   Sohan Rendon is a 68 year old male admitted on 5/1/2018. He has a history of CMP s/p LVAD in 2012, CKD 3 and is admitted for abdominal pain and red urine.    # Abdominal pain: Elevated lipase supports pancreatic etiology, but imaging argues against this. No clear culprits based on history--no alcohol consuption; Ca normal; patient s/p CCY and has no biliary obstruction. Other possible etiologies include PUD, gastritis, venous congestion asstd with volume overload.  - Lipids with AML  - Start CLD today and advance as tolerated.  - Supportive care with IV ondansetron PRN  - Add PPI if symptoms continue     # Suspected LVAD pump thrombosis  - Started heparin gtt per cardiology recommendations  - Continuing warfarin. INR at goal.  - Daily INR     # Hematuria/hemoglobinuria: LDH is increased from baseline, power spikes on LVAD indicate increased resistance in system. Given no RBCs and positive blood dipstick (with normal CK), this is almost certainly due to RBC shearing and lysis in LVAD system. Rx for LVAD thrombus as above.     # History of chronic systolic HF  - Will restart losartan today  - Continuing to hold Lasix and spironolactone given marginal oral intake.     # H/o CVA and seizure  - Continuing Keppra      Diet: NPO for Medical/Clinical Reasons Except for: Meds, Ice Chips, Other; Specify: okay to have water  DVT Prophylaxis: Warfarin  Code Status: Prior    Disposition Plan   Expected discharge: 4 - 7 days, recommended to prior living arrangement once AC plan established.     Entered: Holland Chavira 05/02/2018, 8:41 AM   Information in the above section will display in the discharge planner report.      The patient's care was discussed with the Bedside Nurse, Patient and Patient's Family.    Holland Chavira  Internal Medicine Staff Hospitalist Service  [facility]  Health  Please see sticky note for cross cover information    Interval History     (Daughter at bedside. Patient and daughter prefer daughter to translate rather than hospital .)    - Heparin gtt started overnight due to concern for LVAD thrombus.    - Continues to feel fatigued.  - Reports discomfort in the epigastric area. Non radiating. Mild in severity. Was hungry yesterday evening, however, and is eager to try some food if possible.   - No chest pain or shortness of breath.       Data reviewed today: I reviewed all medications, new labs and imaging results over the last 24 hours.    Physical Exam   Vital Signs: Temp: 97.9  F (36.6  C) Temp src: Axillary BP: 105/84   Heart Rate: 61 Resp: 18 SpO2: 100 % O2 Device: None (Room air)    Weight: 192 lbs 14.44 oz  Sitting up in bed in NAD  Drowsy but awakens to voice. Oriented to place, situation. Answers questions appropriately.   JVD appears to be approx 1 cm above clavicle at 45 deg.  Cor with continuous sound of LVAD  Normal resp rate and effort. Lungs CTA. Occasional cough with scant sputum production.  Abdomen slightly distended. No TTP or peritoneal signs. Bowel sounds obscured by LVAD.   LUE contracted.  BL LEs tepid distal to midshin. No LE edema. No edema of hips.   Drive line dressing c/d/i.

## 2018-05-02 NOTE — PROGRESS NOTES
[facility]  Patient is currently open to home care services with [facility].  The patient is currently receiving Palliative RN services.  [facility]  and team have been notified of patient admission.  [facility] liaison will continue to follow patient during stay.  If appropriate provide orders to resume home care at time of discharge.    Thank you  Melissa Sifuentes RN, BSN  [facility] Liaison  [phone]

## 2018-05-02 NOTE — PLAN OF CARE
Problem: Patient Care Overview  Goal: Plan of Care/Patient Progress Review    6B: Cardiac Rehab orders acknowledged. Writer spoke with pt's daughter who is also his primary caregiver. Per daughter pt is completely dependent at baseline for all cares including feeding, transfers (via lift sheet and assist of 2-3 family members), toileting, etc. Pt's family performs ROM/stretching with pt and is comfortable continuing this practice while pt is IP. No acute CR/OT needs identified therefore will defer eval and complete orders; please reorder if situation changes.

## 2018-05-02 NOTE — PROGRESS NOTES
Vascular Access Services Notes:     Unsuccessful insertion of midline IV catheter in the right upper arm due to very poor vasculature & limited venous access. Pt has a paralyzed left upper extremity secondary to stroke. The only vein seen in the right arm was a lateral brachial close to the underarm & 3.2 cm deep. 6B primary RN & Dr. Holland Chavira informed. Recommendation given to have IV line placed in IR.        KRISTA GoodwinN, RN [facility]

## 2018-05-02 NOTE — PROGRESS NOTES
Admission          5/1/2018  4:44 PM  -----------------------------------------------------------  Reason for admission: Pancreatitis, hematuria  Primary team notified of pt arrival.  Admitted from: ED (home)  Via: stretcher  Accompanied by: Daughter & son  Belongings: Placed in closet.  Admission Profile: In process  Teaching: orientation to unit and call light- call light within reach, call don't fall, use of console, meal times, when to call for the RN, and enforced importance of safety   Access: PIVx1  Telemetry: Placed on pt  Ht./Wt.: complete  2 RN Skin Assessment Completed By: Nadya HENRIQUEZ    Pt status: A&O. LVAD-HM2; parameters WDL. BP 92/78  Temp 98.4  F (36.9  C) (Oral)  Resp 18  SpO2 99% on RA. Pt denying pain at this time. Family at bedside supportive with cares. Will monitor closely.

## 2018-05-02 NOTE — PLAN OF CARE
Problem: Patient Care Overview  Goal: Plan of Care/Patient Progress Review  Outcome: No Change  Patient is A&Ox4. Family at bedside to translate. Vitally stable on RA. Pain managed with tylenol. Truong draining adequate amounts, did become more red in color this afternoon. Tolerating a full liquid diet. 2 PIV in R arm. Turned and repositioned. Heparin gtt stopped today. Will continue current plan of care and update MDs with any changes.      Problem: Renal Insufficiency Comorbidity  Goal: Renal Insufficiency  Patient comorbidity will be monitored for signs and symptoms of Renal Insufficiency (Chronic) condition.  Problems will be absent, minimized or managed by discharge/transition of care.   Outcome: No Change  Voiding adequately via truong catheter.     Problem: Cardiac Disease Comorbidity  Goal: Cardiac Disease  Patient comorbidity will be monitored for signs and symptoms of Cardiac Disease.  Problems will be absent, minimized or managed by discharge/transition of care.   Outcome: No Change  LVAD intact without any alarms.

## 2018-05-02 NOTE — ED NOTES
[facility], [place]   ED Nurse to Floor Handoff     Sohan Rendon is a 68 year old male who speaks Indian and lives with family members,  in a home  They arrived in the ED by ambulance from home    ED Chief Complaint: Abdominal Pain and Hematuria    ED Dx;   Final diagnoses:   Idiopathic acute pancreatitis, unspecified complication status   LVAD (left ventricular assist device) present (H)   Urinary retention         Needed?: No    Allergies:   Allergies   Allergen Reactions     Seasonal Allergies    .  Past Medical Hx:   Past Medical History:   Diagnosis Date     Acute right MCA stroke (H) 6/2013    With L sided hemiparesis      Anemia of chronic disease     Baseline Hb 8-9     BPH (benign prostatic hyperplasia)      CHF (congestive heart failure), NYHA class IV (H) 10/9/2012    s/p HeartMate II.  Was on milrinone and dobutamine prior to LVAD      CKD (chronic kidney disease)     Baseline Cr=     Clostridium difficile colitis 12/2012      Dysphagia     PEG tube placed in 2012.  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  Normal carotid duplex 3/2014.       Esophagitis 12/2012    EGD with esophagitis and multiple douenal ulcers     Fracture of femoral neck, right, closed (H) 2/3/2015    Presumed pathologic as patient is non-weight-bearing and suffered no trauma.  Family declined operative repair during hospital stay 1/23 - 1/30/15.     Gastric and duodenal angiodysplasia with hemorrhage 6/18/2015     GERD (gastroesophageal reflux disease)      Gout      Hematuria      HTN (hypertension)     LDL=59 (3/29/2014)     Hyperlipaemia      Ischemic cardiomyopathy      Mitral regurgitation     s/p MVR with Carbomedics ring      Myocardial infarction 1998    In [facility] without intervention      Nonsenile cataract     BE     PFO (patent foramen ovale)     s/p closure (10/9/2012)     TB lung,  latent     s/p 9 months INH in 2012       Baseline Mental status: WDL  Current Mental Status changes: at basesline    Infection present or suspected this encounter: no  Sepsis suspected: No  Isolation type: Contact     Activity level - Baseline/Home:  Total Care  Activity Level - Current:   Total Care    Bariatric equipment needed?: No    In the ED these meds were given:   Medications   0.9% sodium chloride BOLUS (500 mLs Intravenous New Bag 5/1/18 2122)       Drips running?  Yes  500 ml NS bolus  Home pump  No    Current LDAs  Peripheral IV 05/01/18 Right Hand (Active)   Number of days:0       Urethral Catheter (Active)   Number of days:352       Wound 06/17/16 Mid Perineum Fistula small opening  (Active)   Number of days:683       Labs results:   Labs Ordered and Resulted from Time of ED Arrival Up to the Time of Departure from the ED   CBC WITH PLATELETS DIFFERENTIAL - Abnormal; Notable for the following:        Result Value    RBC Count 3.58 (*)     Hemoglobin 9.9 (*)     Hematocrit 31.7 (*)     MCHC 31.2 (*)     RDW 21.2 (*)     All other components within normal limits   COMPREHENSIVE METABOLIC PANEL - Abnormal; Notable for the following:     Chloride 110 (*)     Glucose 136 (*)     Creatinine 2.14 (*)     GFR Estimate 31 (*)     GFR Estimate If Black 37 (*)     Calcium 8.3 (*)     Albumin 3.1 (*)     All other components within normal limits   LIPASE - Abnormal; Notable for the following:     Lipase 2212 (*)     All other components within normal limits   UA MACROSCOPIC WITH REFLEX TO MICRO AND CULTURE - Abnormal; Notable for the following:     Blood Urine Large (*)     Protein Albumin Urine 30 (*)     Bacteria Urine Few (*)     Mucous Urine Present (*)     Amorphous Crystals Few (*)     All other components within normal limits   INR - Abnormal; Notable for the following:     INR 2.98 (*)     All other components within normal limits   LACTIC ACID WHOLE BLOOD   CK TOTAL   CK TOTAL   LACTATE DEHYDROGENASE    CK TOTAL   LACTATE DEHYDROGENASE       Imaging Studies:   Recent Results (from the past 24 hour(s))   Abdomen US, limited (RUQ only)    Narrative    EXAMINATION: Limited Abdominal Ultrasound, 5/1/2018 7:45 PM     COMPARISON: CT 3/18/2018, ultrasound 5/7/2017    HISTORY: Acute pancreatitis, evaluate for gallstones/obstruction    FINDINGS:   Fluid: No evidence of ascites or pleural effusions.    Liver: The liver demonstrates normal echotexture, measuring 12.6 cm in  craniocaudal dimension. There is no focal mass. The left lobe is not  well seen due to overlying left ventricular assist device and  dressings.    Gallbladder: Cholecystectomy.    Bile Ducts: No evidence of intrahepatic biliary dilatation.  The  common bile duct measures 6 mm in diameter. The distal duct is not  well seen. No evidence of filling defect. The stent is not well seen.    Pancreas: Poorly visualized.    Kidney: The right kidney measures 9.9 cm long. There is no  hydronephrosis or hydroureter, no shadowing renal calculi, or solid  mass. There is a 3.7 cm simple cyst of the upper pole, not  significantly changed from previous imaging.       Impression    IMPRESSION:   1.  No intrahepatic or extrahepatic biliary dilatation or evidence of  choledocholithiasis.  2.  Exam is somewhat limited by left ventricular assist device and  dressings, making visualization of the pancreas, distal common bile  duct, and left hepatic lobe difficult.    I have personally reviewed the examination and initial interpretation  and I agree with the findings.    HUNTER SEPULVEDA MD   CT Abdomen Pelvis w/o Contrast    Narrative    EXAMINATION: TEMPORARY, 5/1/2018 10:08 PM    TECHNIQUE:  Helical CT images from the lung bases through the  symphysis pubis were obtained  without IV contrast.     COMPARISON: CT abdomen/pelvis 3/18/2018    HISTORY: Acute pancreatitis, right upper quadrant ultrasound shows no  gallstones, patient has renal insufficiency with creatinine of  2.14,  please evaluate for pancreatic lesion/mass    FINDINGS:  Abdomen/pelvis: Left upper quadrant left ventricular assist device  contributes to streak artifact which partially obscures visualization  of the upper abdomen. The LVAD device and its stripe line are  unremarkable in appearance.    Fatty infiltration of the pancreas which is otherwise normal,  acknowledging limited visualization. No significant peripancreatic fat  stranding. No definite pancreatic lesion or fluid collection.  Gallbladder is surgically absent. Scattered foci of intrahepatic  pneumobilia with common bile duct stent in place. No intrahepatic or  extra hepatic biliary ductal dilatation. Lobulated appearance to the  spleen is unchanged over the course of multiple prior exams, not  enlarged. Normal adrenals. Simple fluid attenuation exophytic cysts  arising from both kidneys are not significantly changed. Asymmetric  atrophy of the right kidney, unchanged. No hydronephrosis normal  urinary bladder and prostate gland. Small sliding-type hiatal hernia.  No dilated small or large bowel. Scattered colonic diverticulosis  without diverticulitis. Normal appendix. No free air, free fluid,  pneumatosis, or portal venous gas. No abdominal aortic aneurysm.    Lung bases: Mild bibasilar atelectasis, left greater than right. No  pleural or pericardial effusion. Heart size is enlarged. Extensive  coronary artery calcification and annular mitral valve calcification.  Partially visualized median sternotomy wires.    Bones and soft tissues: Degenerative changes in the lower lumbar  spine. Chronic right femoral subcapital fracture unchanged in  appearance. No acute-appearing or suspicious osseous lesion surgical  clips in the right groin. Diffuse muscular atrophy.      Impression    IMPRESSION:   1. Suboptimal visualization of the pancreas secondary to lack of  intravenous contrast and streak artifact from left upper quadrant left  ventricular assist device.  No convincing CT findings of acute  pancreatitis or evidence of pancreatic mass lesion or fluid  collection. There is fatty infiltration and atrophy of the pancreas.   2. Stable positioning of common bile duct stent without intrahepatic  or extra hepatic biliary ductal dilatation. Gallbladder is surgically  absent.  3. Unchanged asymmetric atrophy of the right kidney and bilateral  simple renal cysts.  4. 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  5. Diverticulosis without diverticulitis.       Recent vital signs:   /85  Temp 98.4  F (36.9  C) (Oral)  Resp 16  SpO2 97%    Cardiac Rhythm: Other  Pt needs tele? Yes  Skin/wound Issues: None    Code Status: Full Code    Pain control: pt had none    Nausea control: pt had none    Abnormal labs/tests/findings requiring intervention: elevated lipase, urinary retention    Family present during ED course? Yes   Family Comments/Social Situation comments: Family @ bedside helping with personal cares    Tasks needing completion: None    Aliza Guy, RN  [facility]-- [phone] [facility]  [phone] [facility]

## 2018-05-02 NOTE — H&P
"[facility]    Internal Medicine History and Physical - [facility] Service       Date of Admission:  5/1/2018    Chief Complaint   Abdominal Pain, \"blood in urine\"     History is obtained from the patient and daughter     History of Present Illness   Sohan Rendon is a 68 year old male with h/o ICFm s/p LVAD HM2 DT (2012), s/p MVR, multiple ischemic CVAs w/ residual left-sided weakness (immobile at baseline), latent TB, HTN, HLD, PFO s/p closure in 2012, CKDIII, BPH, GERD, gout with recent admission for pump thrombosis to the [facility] 2 team who presents to the ED today with abdominal pain and elevated lipase.     Patient has noted RUQ pain, near his driveline site (denies any discharge from the site). The pain has been intermittent over the past 3 months but much more severe over the past 1-2 days. He denies N/V, but has lost his appetite. He denies worsening pain with or without food. Denies diarrhea or constipation, no black/bloody stool. Denies fever/chills. He does not have a prior history of pancreatitis. He denies etoh use. No new medications. The pain is currently resolved after pain medication in the ED. He also denies any muscle pain. Denies recent seizure activity, last seizure was in 2013 with the stroke. He has also noted 1 day of \"blood in the urine\" and difficulty urinating. Denies burning with urination. Denies h/o UTIs. The dark/red urine is the reason his daughter decided to bring him to the ED.    In the ED - HDS.   Labs significant for lipase of 2200, LDH 2000, Crt 2.14 (BL 1.8). Elevated . CK normal. UA with blood but not RBC.     Abd US - limited d/t LVAD, no biliary dilation  CT Abd/Pelv:  Limited /t artifact from LVAD - supoptimal view of pancrease d/t lack of IV contrast but no obvious signs of acute pancreatitis. Stable common bile duct stent without dilation.     [facility] admission: 4/11-4/17 for LVAD thrombus. Was discharged on warfarin. Currently " therapeutic. On review of LVAD alarms patient had a few power spikes yesterday AM (5/1) around 7AM up to 15. Currently 4-5 range. Flow max of 9 around same time, currently 4-5 range. PI 4-6 range.     Review of Systems   The 10 point Review of Systems is negative other than noted in the HPI or here.     Past Medical History    I have reviewed this patient's medical history and updated it with pertinent information if needed.   Past Medical History:   Diagnosis Date     Acute right MCA stroke (H) 6/2013    With L sided hemiparesis      Anemia of chronic disease     Baseline Hb 8-9     BPH (benign prostatic hyperplasia)      CHF (congestive heart failure), NYHA class IV (H) 10/9/2012    s/p HeartMate II.  Was on milrinone and dobutamine prior to LVAD      CKD (chronic kidney disease)     Baseline Cr=     Clostridium difficile colitis 12/2012      Dysphagia     PEG tube placed in 2012.  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  Normal carotid duplex 3/2014.       Esophagitis 12/2012    EGD with esophagitis and multiple douenal ulcers     Fracture of femoral neck, right, closed (H) 2/3/2015    Presumed pathologic as patient is non-weight-bearing and suffered no trauma.  Family declined operative repair during hospital stay 1/23 - 1/30/15.     Gastric and duodenal angiodysplasia with hemorrhage 6/18/2015     GERD (gastroesophageal reflux disease)      Gout      Hematuria      HTN (hypertension)     LDL=59 (3/29/2014)     Hyperlipaemia      Ischemic cardiomyopathy      Mitral regurgitation     s/p MVR with Carbomedics ring      Myocardial infarction 1998    In [facility] without intervention      Nonsenile cataract     BE     PFO (patent foramen ovale)     s/p closure (10/9/2012)     TB lung, latent     s/p 9 months INH in 2012         Past Surgical History   I have reviewed this patient's surgical history and updated it with  pertinent information if needed.  Past Surgical History:   Procedure Laterality Date     C CABG, VEIN, FIVE  2001     CATARACT IOL, RT/LT Right 11/19/2015     ENDOSCOPIC RETROGRADE CHOLANGIOPANCREATOGRAM N/A 5/9/2017    Procedure: COMBINED ENDOSCOPIC RETROGRADE CHOLANGIOPANCREATOGRAPHY, PLACE TUBE/STENT;  Endoscopic Retrograde Cholangiopancreatogram, Stent (Zimmon Biliary 7fr 12cm) Placement;  Surgeon: Cristo Grove MD;  Location: [facility] OR     ESOPHAGOSCOPY, GASTROSCOPY, DUODENOSCOPY (EGD), COMBINED N/A 6/10/2015    Procedure: COMBINED ESOPHAGOSCOPY, GASTROSCOPY, DUODENOSCOPY (EGD);  Surgeon: Edu Jenkins MD;  Location: [facility] GI     ESOPHAGOSCOPY, GASTROSCOPY, DUODENOSCOPY (EGD), COMBINED N/A 6/15/2015    Procedure: COMBINED ESOPHAGOSCOPY, GASTROSCOPY, DUODENOSCOPY (EGD);  Surgeon: Yury Bonner MD;  Location: [facility] OR     H INSERT ICD  2/10/2011    And pacemaker.  Not BiV     INSERT VENTRICULAR ASSIST DEVICE LEFT (HEARTMATE II)  10/9/2012     IR GASTRO JEJUNOSTOMY TUBE PLACEMENT       PHACOEMULSIFICATION CLEAR CORNEA WITH STANDARD INTRAOCULAR LENS IMPLANT Right 11/19/2015    Procedure: PHACOEMULSIFICATION CLEAR CORNEA WITH STANDARD INTRAOCULAR LENS IMPLANT;  Surgeon: Violeta Cosme MD;  Location: [facility] OR     REPAIR PATENT FORAMEN OVALE  10/9/2012     REPAIR VALVE MITRAL  2/9/2012    28 mm Carbomedics 2/3 ring         Social History   Social History   Substance Use Topics     Smoking status: Former Smoker     Smokeless tobacco: Former User     Alcohol use No       Family History   I have reviewed this patient's family history and updated it with pertinent information if needed.   Family History   Problem Relation Age of Onset     Family History Negative No family hx of      Glaucoma No family hx of      Macular Degeneration No family hx of      CANCER No family hx of      no skin cancer       Prior to Admission Medications   Prior to Admission Medications   Prescriptions Last Dose Informant Patient  Reported? Taking?   ALEK CONTOUR test strip   No No   Sig: USE TO TEST BLOOD SUGAR 2 TIMES DAILY OR AS DIRECTED.   MELATONIN PO   Yes No   Sig: Take 5 mg by mouth At Bedtime   Metoprolol Succinate (TOPROL XL PO)  Daughter Yes No   Sig: Take 50mg by mouth every morning. Take 25mg by mouth every evening.   ORDER FOR DME   No No   Sig: Equipment being ordered: Lift chair.    Please see indications and face-to-face encounter details in 2/3/15 Office Visit note.   PANTOPRAZOLE SODIUM PO   Yes No   Sig: Take 20 mg by mouth every morning (before breakfast)   Thiamine HCl (VITAMIN B-1) 100 MG TABS   No No   Sig: TAKE 1 TABLET (100 MG) BY MOUTH DAILY   acetaminophen (TYLENOL) 325 MG tablet   Yes No   Sig: Take 2 tablets (650 mg) by mouth every 6 hours as needed for mild pain   bisacodyl (DULCOLAX) 10 MG Suppository  Daughter No No   Sig: Place 1 suppository (10 mg) rectally daily as needed for constipation   blood glucose monitoring (ALEK MICROLET) lancets   No No   Sig: USE TO TEST BLOOD SUGAR 2 TIMES DAILY OR AS DIRECTED   blood glucose monitoring (NO BRAND SPECIFIED) lancets   No No   Sig: Use to test blood sugars 2 times daily or as directed.   finasteride (PROSCAR) 5 MG tablet  Daughter No No   Sig: Take 1 tablet (5 mg) by mouth daily   furosemide (LASIX) 20 MG tablet  Daughter No No   Sig: Take 1 tablet (20 mg) by mouth as needed   ipratropium - albuterol 0.5 mg/2.5 mg/3 mL (DUONEB) 0.5-2.5 (3) MG/3ML neb solution   No No   Sig: Take 1 vial (3 mLs) by nebulization every 6 hours as needed for shortness of breath / dyspnea, wheezing or other   ketotifen (ZADITOR/REFRESH ANTI-ITCH) 0.025 % SOLN ophthalmic solution  Daughter No No   Sig: Place 1 drop into both eyes 2 times daily   Patient taking differently: Place 1 drop into both eyes daily    levETIRAcetam (KEPPRA) 750 MG tablet  Daughter No No   Sig: TAKE 1 TABLET (750 MG) BY MOUTH 2 TIMES DAILY   loratadine (CLARITIN) 10 MG tablet   No No   Sig: TAKE 1 TABLET (10 MG)  BY MOUTH DAILY   losartan (COZAAR) 25 MG tablet   Yes No   Sig: Take 1 tablet (25 mg) by mouth daily   nitroglycerin (NITROSTAT) 0.4 MG SL tablet  Daughter No No   Sig: Place 1 tablet (0.4 mg) under the tongue every 5 minutes as needed for chest pain   nystatin (MYCOSTATIN) 404864 UNIT/GM POWD   No No   Sig: Apply to affected areas of skin in the groin and on the scrotum three times a day as needed.   order for DME   No No   Sig: Equipment being ordered: Bilateral leg supports for manual wheelchair.   order for DME   No No   Sig: Equipment being ordered: Reclining manual w/c with bilateral elevating leg rests.   order for DME   No No   Sig: Equipment being ordered: Hospital Bed, fully electric.   order for DME   No No   Sig: Equipment being ordered: Wheelchair, manual.   order for DME   No No   Sig: Equipment being ordered: Wheelchair, manual, with elevated leg rests and tilt in space back.  Please fax to [employer]; I called them 11/26/16 and they requested the new order.  Face to face is in my 10/26/16 note.   order for DME   No No   Sig: Equipment being ordered: Cushioned heel boots.   order for DME   No No   Sig: Bilateral heel protective cushioning boots.  Dx: previous stroke with left hemiplegia.  Duration of need: 99 months.   order for DME   No No   Sig: Equipment being ordered: Nebulizer   oxyCODONE (ROXICODONE) 5 MG IR tablet   No No   Sig: Take 1 tablet (5 mg) by mouth every 4 hours as needed for moderate to severe pain   senna-docusate (SENEXON-S) 8.6-50 MG per tablet   No No   Sig: Take 2 tablets by mouth 2 times daily as needed for constipation   simethicone (MYLICON) 80 MG chewable tablet  Daughter No No   Sig: Take 1 tablet (80 mg) by mouth 4 times daily   tamsulosin (FLOMAX) 0.4 MG capsule   No No   Sig: Take 2 capsules (0.8 mg) by mouth daily   warfarin (COUMADIN) 1 MG tablet   No No   Sig: Take 2.5 tablets (2.5 mg) by mouth daily      Facility-Administered Medications: None     Allergies   Allergies    Allergen Reactions     Seasonal Allergies        Physical Exam   Vital Signs: Temp: 98.4  F (36.9  C) Temp src: Oral BP: 115/84   Heart Rate: 59 Resp: 17 SpO2: 99 %      Weight: 0 lbs 0 oz    General Appearance: Appears comfortable, laying in bed  Eyes: anicteric, PERRL  HEENT: Dry MM, no LAD  Respiratory: CTAB  Cardiovascular: RRR, no r/m/g  GI: Mild tenderness RUQ, no discharge from driveline site.   Skin: no rashes  Musculoskeletal: Muscles soft/supple throughout, no pain to palpation, no swelling.   Neurologic: Hemiparesis of left side, sensation intact.     Assessment & Plan   Sohan Rendon is a 68 year old male with h/o ICFm s/p LVAD HM2 DT (2012), s/p MVR, multiple ischemic CVAs w/ residual left-sided weakness (immobile at baseline), latent TB, HTN, HLD, PFO s/p closure in 2012, CKDIII, BPH, GERD, gout with recent admission for pump thrombosis to the cards 2 team who presents to the ED today with abdominal pain and elevated lipase.    # Abdominal Pain  # Elevated Lipase  Presentation consisted with pancreatitis, however imaging not diagnostic (limited d/t LVAD artifact). Lipase markedly elevated at 2200. Reduced PO intake over the past 1-2 days.   -- IVF (judicous given appears mildly hypovolemic to euvolemic and LVAD patient), s/p 1 L LR  -- NPO tonight, maybe able to advance tomorrow AM  -- Pain control with PRN APAP and oxy for break through (currently not having significant pain)    # Heme colored Urine  UA with large blood though no RBC - possibly myoglobinuria given elevated LDH and AST (though CK is normal) (per lab only other possibility is if all RBC are hemolyzed)- patient has history of hematuria and hemolysis associated with LVAD. Not concerned for infection at this time.   - Repeating UA and can add on urine quant myoglobin   - monitor Hgb and LDH    # Concern for LVAD Thrombus  # Chronic HFrEF, ICM s/p LVAD HM2 as DT   Recently admitted for LVAD thrombus. Started on warfarin. INR 2.9 on admit.  Has been therapeutic since discharge. LDH in the 900s last admit, now 2000s. Review of LVAD alarms shows power spike up to 15 yesterday morning at 7AM (5/1), since then has been in 4-5 range. No new signs/symptoms of stroke.   -- Cardiology Consultation   -- Trend LDH  -- Continue warfarin, Daily INR  -- Cardiac Meds: continue Toprol  -- HOLDING: Losartan for HEATH, Spironolactone and lasix for HEATH and hypovolemic/pancreatitis treatment. Will likely need to restart tomorrow.   -- ASA dc'd last admit for hematuria     # h/o Seizure - continue keppra      # Pain Assessment:  Current Pain Score 4/17/2018   Patient currently in pain? denies   Pain score (0-10) -   Pain location -   Pain descriptors -   - Sohan is experiencing pain due to abdominal pain. Pain management was discussed and the plan was created in a collaborative fashion.  Sohan's response to the current recommendations: engaged  - Please see the plan for pain management as documented above        Diet:  NPO, likely advance in AM  Fluids: 1 L LR  DVT Prophylaxis: Warfarin  Code Status: Full Code    Disposition Plan   Expected discharge: 2 - 3 days; recommended to prior living arrangement once adequate pain management/ tolerating PO medications.     Entered: Lily Hussein 05/01/2018, 9:46 PM   Information in the above section will display in the discharge planner report.    The patient was discussed with Dr. Sukhdev Hussein  [facility]   Pager: [phone]  Please see sticky note for cross cover information      Data   Data     Recent Labs  Lab 05/02/18  0049 05/01/18  1657 04/30/18   WBC 8.1 7.2  --    HGB 9.1* 9.9*  --    MCV 89 89  --     175  --    INR 2.93* 2.98* 2.8    137  --    POTASSIUM 4.1 4.2  --    CHLORIDE 112* 110*  --    CO2 21 21  --    BUN 27 26  --    CR 2.10* 2.14*  --    ANIONGAP 5 6  --    JOSÉ 7.9* 8.3*  --    * 136*  --    ALBUMIN 2.9* 3.1*  --     PROTTOTAL 7.1 7.4  --    BILITOTAL 0.9 0.9  --    ALKPHOS 93 100  --    ALT 24 26  --    * 146*  --    LIPASE  --  2212*  --      Recent Results (from the past 24 hour(s))   Abdomen US, limited (RUQ only)    Narrative    EXAMINATION: Limited Abdominal Ultrasound, 5/1/2018 7:45 PM     COMPARISON: CT 3/18/2018, ultrasound 5/7/2017    HISTORY: Acute pancreatitis, evaluate for gallstones/obstruction    FINDINGS:   Fluid: No evidence of ascites or pleural effusions.    Liver: The liver demonstrates normal echotexture, measuring 12.6 cm in  craniocaudal dimension. There is no focal mass. The left lobe is not  well seen due to overlying left ventricular assist device and  dressings.    Gallbladder: Cholecystectomy.    Bile Ducts: No evidence of intrahepatic biliary dilatation.  The  common bile duct measures 6 mm in diameter. The distal duct is not  well seen. No evidence of filling defect. The stent is not well seen.    Pancreas: Poorly visualized.    Kidney: The right kidney measures 9.9 cm long. There is no  hydronephrosis or hydroureter, no shadowing renal calculi, or solid  mass. There is a 3.7 cm simple cyst of the upper pole, not  significantly changed from previous imaging.       Impression    IMPRESSION:   1.  No intrahepatic or extrahepatic biliary dilatation or evidence of  choledocholithiasis.  2.  Exam is somewhat limited by left ventricular assist device and  dressings, making visualization of the pancreas, distal common bile  duct, and left hepatic lobe difficult.    I have personally reviewed the examination and initial interpretation  and I agree with the findings.    HUNTER SEPULVEDA MD   CT Abdomen Pelvis w/o Contrast    Narrative    EXAMINATION: TEMPORARY, 5/1/2018 10:08 PM    TECHNIQUE:  Helical CT images from the lung bases through the  symphysis pubis were obtained  without IV contrast.     COMPARISON: CT abdomen/pelvis 3/18/2018    HISTORY: Acute pancreatitis, right upper quadrant ultrasound  shows no  gallstones, patient has renal insufficiency with creatinine of 2.14,  please evaluate for pancreatic lesion/mass    FINDINGS:  Abdomen/pelvis: Left upper quadrant left ventricular assist device  contributes to streak artifact which partially obscures visualization  of the upper abdomen. The LVAD device and its stripe line are  unremarkable in appearance.    Fatty infiltration of the pancreas which is otherwise normal,  acknowledging limited visualization. No significant peripancreatic fat  stranding. No definite pancreatic lesion or fluid collection.  Gallbladder is surgically absent. Scattered foci of intrahepatic  pneumobilia with common bile duct stent in place. No intrahepatic or  extra hepatic biliary ductal dilatation. Lobulated appearance to the  spleen is unchanged over the course of multiple prior exams, not  enlarged. Normal adrenals. Simple fluid attenuation exophytic cysts  arising from both kidneys are not significantly changed. Asymmetric  atrophy of the right kidney, unchanged. No hydronephrosis normal  urinary bladder and prostate gland. Small sliding-type hiatal hernia.  No dilated small or large bowel. Scattered colonic diverticulosis  without diverticulitis. Normal appendix. No free air, free fluid,  pneumatosis, or portal venous gas. No abdominal aortic aneurysm.    Lung bases: Mild bibasilar atelectasis, left greater than right. No  pleural or pericardial effusion. Heart size is enlarged. Extensive  coronary artery calcification and annular mitral valve calcification.  Partially visualized median sternotomy wires.    Bones and soft tissues: Degenerative changes in the lower lumbar  spine. Chronic right femoral subcapital fracture unchanged in  appearance. No acute-appearing or suspicious osseous lesion surgical  clips in the right groin. Diffuse muscular atrophy.      Impression    IMPRESSION:   1. Suboptimal visualization of the pancreas secondary to lack of  intravenous contrast and  streak artifact from left upper quadrant left  ventricular assist device. No convincing CT findings of acute  pancreatitis or evidence of pancreatic mass lesion or fluid  collection. There is fatty infiltration and atrophy of the pancreas.   2. Stable positioning of common bile duct stent without intrahepatic  or extra hepatic biliary ductal dilatation. Gallbladder is surgically  absent.  3. Unchanged asymmetric atrophy of the right kidney and bilateral  simple renal cysts.  4. 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  5. Diverticulosis without diverticulitis.

## 2018-05-02 NOTE — PROGRESS NOTES
SPIRITUAL HEALTH SERVICES  SPIRITUAL ASSESSMENT Progress Note  [facility] 6B     REFERRAL SOURCE: Hospital  Request    Attempted to visit with pt but they were unavailable.     PLAN: will try back later today for follow up.     Sharita Rocha  Chaplain Resident  Pager [phone]

## 2018-05-02 NOTE — PROGRESS NOTES
Care Coordinator Discharge Note    Admission Date/Time:  5/1/2018  Attending MD:  Franki Santizo MD    Data  Chart reviewed, discussed with interdisciplinary team.   Patient was admitted for:    Idiopathic acute pancreatitis, unspecified complication status  LVAD (left ventricular assist device) present (H)  Urinary retention  Presence of heart assist device (H)  Long-term (current) use of anticoagulants.    Concerns with insurance coverage for discharge needs: None.  Current Living Situation: Patient lives with family.  Support System: Supportive and Involved  Services Involved: [facility] Palliative, [facility], PCA  Transportation at Discharge: [facility] stretcher transport last  Transportation to Medical Appointments:  - Name of caregiver: family  Barriers to Discharge: completion of medical workup    Coordination of Care and Referrals: resumption of services needed:  [facility] Palliative Care - Skilled Nursing only (orders completed)    Assessment  5/3: Patient medically ready for discharge today, orders are signed. [facility] stretcher setup for 530pm this evening, bedside nurse and [facility] Care Liaison notified.      5/2: D: Chart reviewed and plan of care discussed with MD team. Patient with LVAD admitted for elevated lipase, abdominal pain and red urine; work up in progress.    I/A: RNCC spoke with [facility]  Nena Salazar (P: [phone]) today and comfirmed the following information:  Antionette Ruffin (daughter) is pt's PCA for 11 hours day paid by [facility].   Patient also has extended PCA coverage of 4 more hours paid through a separate source.  Total patient has paid PCA coverage of 15 hours per day.    [facility] Palliative Care - Skilled Nursing only (orders completed)    P: Care Coordinator will remain available for discharge needs.      Plan  Anticipated Discharge Date:  5/3/2018  Anticipated Discharge Plan:  Home    CTS Handoff Complete:  YES    Kecia Cintron BSN RN PHN  Patient Care Management Coordinator  [facility]  Phone: [phone] / Pager: [phone]

## 2018-05-02 NOTE — PROGRESS NOTES
[facility] Care Coordination Contact  CM notified client was admitted to the hospital with abdominal pain and hematuria. CM spoke with client's hospital nurse today and informed her of the services client receives at home. She will notify hospital CC with this information. CM also called client's PCA agency to notify them of his admission to the hospital.   Nena Salazar RN  [facility]   [phone]

## 2018-05-02 NOTE — CONSULTS
CARDIOLOGY INPATIENT CONSULTATION  May 2, 2018    Reason for Consult:    A cardiology consult was requested by Dr. Hussein from the medicine service to provide clinical guidance regarding increased LDH in a patient with LVAD.      ASSESSMENT:    h/o ICM s/p LVAD HM2 DT (2012), s/p ICD 2011, s/p MVR, multiple ischemic CVAs w/ residual left-sided weakness (immobile at baseline), latent TB, HTN, HLD, PFO s/p closure in 2012, CKDIII, BPH, GERD, gout with recent admission for pump thrombosis to the cards 2 team who presents to the ED today with abdominal pain and concerns for urine color changes.   Given the elevated LDH, power spikes, history of LVAD thrombus, subtherapeutic INR at one measurement, the patient most likely developed a new thrombus  He also looks like volume up (weight is 210 lbs from 180 lbs and abdomen distended on physical exam).      RECOMMENDATION:  --High intensity hep gtt. Given history of bleeding, would recommend against bolusing  --Continue home warfrin. Dose adjusted by pharmacy.   --Continue for now home cardiac regimen  --Trend LDH  --Since weight is up and abdomen distented without pain, could consider gentle diuresis when team comfortable given concurrent pancreatitis      Patient to be discussed by Cards II team in AM    Yordan Carney MD  Cardiology Fellow  [phone]      --------------------------------------------------------------------------------------------------------------------------------------------------------------------------------------------------------------------------------------------------------------------------------------------------------------------------------------    HPI:     68 year old male with h/o ICM s/p LVAD HM2 DT (2012), s/p ICD 2011, s/p MVR, multiple ischemic CVAs w/ residual left-sided weakness (immobile at baseline), latent TB, HTN, HLD, PFO s/p closure in 2012, CKDIII, BPH, GERD, gout with recent admission for pump thrombosis to  "the cards 2 team who presents to the ED today with abdominal pain and concerns for urine color changes.      He noted 1 day of \"blood in the urine\" and difficulty urinating. Denies diarrhea or constipation, no black/bloody stool. Denies fever/chills, new medications. The pain resolved after pain medication in the ED.  He was found to have lipase of 2200, LDH 2000, Crt 2.14 (BL 1.8). Elevated . CK normal. UA with blood but not RBC.      Cards admission: 4/11-4/17 for LVAD thrombus. He was placed on heparin and was discharged on warfarin with INR goal of 1.8 - 2.5. Currently therapeutic. Review of the INR values on EPIC, his INR has been therapeutic except for the 4/24, when it was 1.6. On review of LVAD alarms patient had a few power spikes yesterday AM (5/1) around 7AM up to 15. Currently 4-5 range. Flow max of 9 around same time, currently 4-5 range. PI 4-6 range.         PMH:    As outlined above      SOCIAL HISTORY:    Social History   Substance Use Topics     Smoking status: Former Smoker     Smokeless tobacco: Former User     Alcohol use No       FAMILY HISTORY:    Family History   Problem Relation Age of Onset     Family History Negative No family hx of      Glaucoma No family hx of      Macular Degeneration No family hx of      CANCER No family hx of      no skin cancer       MEDICATIONS:      finasteride  5 mg Oral Daily     ketotifen  1 drop Both Eyes Daily     lactated ringers  500 mL Intravenous Once     levETIRAcetam  750 mg Oral BID     loratadine  10 mg Oral Daily     melatonin tablet 5 mg  5 mg Oral At Bedtime     metoprolol succinate (TOPROL-XL) 24 hr tablet 25 mg  25 mg Oral At Bedtime     pantoprazole (PROTONIX) EC tablet 20 mg  20 mg Oral QAM AC     tamsulosin  0.8 mg Oral Daily     thiamine  100 mg Oral Daily         Warfarin Therapy Reminder         PHYSICAL EXAM:    Temp:  [97.9  F (36.6  C)-98.4  F (36.9  C)] 97.9  F (36.6  C)  Heart Rate:  [59-86] 61  Resp:  [11-23] 18  BP: " ()/(73-90) 116/81  SpO2:  [93 %-100 %] 100 %    Intake/Output Summary (Last 24 hours) at 05/02/18 0449  Last data filed at 05/02/18 0330   Gross per 24 hour   Intake              500 ml   Output              875 ml   Net             -375 ml     GENERAL: sleeping    HEENT: NC/AT.      HEART: LVAD hum    LUNGS: CTA.  No ronchi, wheezes, rales.     NEURO: sleepy, no detailed exam  ABDOMEN: Soft, nontender, mildly distended.  EXTREMITIES: No clubbing, cyanosis, or edema.       DIAGNOSTIC TESTING:     LDH        ROUTINE ICU LABS (Last four results)  CMP  Recent Labs  Lab 05/02/18  0049 05/01/18  1657    137   POTASSIUM 4.1 4.2   CHLORIDE 112* 110*   CO2 21 21   ANIONGAP 5 6   * 136*   BUN 27 26   CR 2.10* 2.14*   GFRESTIMATED 32* 31*   GFRESTBLACK 38* 37*   JOSÉ 7.9* 8.3*   MAG 2.0  --    PROTTOTAL 7.1 7.4   ALBUMIN 2.9* 3.1*   BILITOTAL 0.9 0.9   ALKPHOS 93 100   * 146*   ALT 24 26     CBC  Recent Labs  Lab 05/02/18  0049 05/01/18  1657   WBC 8.1 7.2   RBC 3.34* 3.58*   HGB 9.1* 9.9*   HCT 29.6* 31.7*   MCV 89 89   MCH 27.2 27.7   MCHC 30.7* 31.2*   RDW 21.4* 21.2*    175     INR  Recent Labs  Lab 05/02/18  0500 05/02/18  0049 05/01/18  1657 04/30/18   INR 2.90* 2.93* 2.98* 2.8     Arterial Blood GasNo lab results found in last 7 days.      Lab Results   Component Value Date    TROPI <0.015 05/02/2018    TROPI <0.015 03/07/2018    TROPI <0.015 11/22/2017    TROPI  05/03/2017     <0.015  The 99th percentile for upper reference range is 0.045 ug/L.  Troponin values in   the range of 0.045 - 0.120 ug/L may be associated with risks of adverse   clinical events.      TROPI  05/02/2017     <0.015  The 99th percentile for upper reference range is 0.045 ug/L.  Troponin values in   the range of 0.045 - 0.120 ug/L may be associated with risks of adverse   clinical events.      TROPONIN 0.00 10/11/2014    TROPONIN 0.00 08/26/2014    TROPONIN 0.03 07/03/2014       Imaging:    Abd US - limited d/t  LVAD, no biliary dilation    CT Abd/Pelv:  Limited /t artifact from LVAD - supoptimal view of pancrease d/t lack of IV contrast but no obvious signs of acute pancreatitis. Stable common bile duct stent without dilation.       EKG:  V-paced    Transthoracic Echocardiogram (11/27/2017)    Interpretation Summary  HM II LVAD Study at 8800 RPM. Technically difficult due to extremely poor  acoustic windows.     Left ventricular wall thickness and size is normal (LVEDD = 5.6 cm; LVESD =  4.9 cm).  Visual estimate of LVEF is <30% which is severely reduced.  Inflow cannula visualized in LV with non-pulsatile flow by PW spectral Doppler  at velocities < 20 cm/s and no color flow demonstrated at a Nyquist limit of  60 cm/s. In one view there is a small echodensity which is probable artifact.  Clinical correlation required. Outflow cannula visualized in the aorta with  low pulsatile flow (PSV 74 cm/s, EDV 30 cm/s) on Spectral Doppler and color  flow demonstrated.  Septum midline with slight bowing towards LV in diastole.  Moderately reduced RV systolic function. Unable to assess RV size but on  limited views appears enlarged.  Aortic valve poorly visualized but appears closed with no Doppler color flow  across valve.  Mitral annular calcification and mitral valve thickening with no significant  dysfunction.  TV not visualized.  PA systolic pressure 24 mmHg plus RA pressure.  IVC not visualized.  No obvious pericardial effusion.        Compared to prior study (8/2017), biventricular function appears worsened on  some views and septum bows slightly to the left. Inflow velocities are less  pulsatile with slightly lower velocites.

## 2018-05-02 NOTE — PHARMACY-ANTICOAGULATION SERVICE
Clinical Pharmacy - Warfarin Dosing Consult     Pharmacy has been consulted to manage this patient s warfarin therapy.  Indication: LVAD/RVAD  Therapy Goal:  (2.5-3)  Warfarin Prior to Admission: Yes  Warfarin PTA Regimen: 4/30: 3 mg; 5/1: 3 mg; 5/2: 4.5 mg; 5/3: 3 mg; 5/4: 3 mg; 5/5: 4.5 mg; 5/6: 3 mg    INR   Date Value Ref Range Status   05/01/2018 2.98 (H) 0.86 - 1.14 Final   04/30/2018 2.8  Final     Chromogenic Factor 10   Date Value Ref Range Status   08/10/2014 24 (L) 70 - 130 % Final     Comment:     Therapeutic Range:  A Chromogenic Factor 10 level of approximately 20-40%   inversely correlates with an INR of 2-3 for patients receiving Warfarin.   Chromogenic Factor 10 levels below 20% indicate an INR greater than 3 and   levels above 40% indicate an INR less than 2.       Factor 2 Assay   Date Value Ref Range Status   07/21/2014 25 (L) 60 - 140 % Final     Comment:     Analyte Specific Reagents (ASRs) are used in many laboratory tests necessary   for   standard medical care and generally do not require FDA approval.  This test   was   developed and its performance characteristics determined by [facility] [place] [place] [facility].  It has not been cleared or approved by   the [place] Food and Drug Administration.       Recommend warfarin 3 mg today.      Pharmacy will monitor Sohan Rendon daily and order warfarin doses to achieve specified goal.      Please contact pharmacy as soon as possible if the warfarin needs to be held for a procedure or if the warfarin goals change.      Jessica Weaver, PharmD, BCPS

## 2018-05-02 NOTE — PROGRESS NOTES
CLINICAL NUTRITION SERVICES    Reason for Assessment:  Low-sodium (2 g/day) nutrition education    Diet History:  Pt reports no history of receiving low-sodium nutrition education in the past.    Nutrition Diagnosis:  Food- and nutrition-related knowledge deficit r/t no previous knowledge of low-sodium diet AEB pt report of no previous formal low-sodium nutrition education.    Nutrition Prescription/Recs:  Continue low-sodium diet.      Interventions:  Nutrition Education  1. Provided the following handouts:  How to Read Nutrition Labels, Low-Sodium Foods and Drinks, Tips for a Low-Sodium Diet, Seasoning Your Foods Without Adding Salt, and Managing Fluid Restriction.      Goals:    Pt will verbalize at least five high sodium foods and the importance of avoiding added salt to foods for cooking or seasoning foods.     Follow-up:   Patient to ask any further nutrition-related questions before discharge.  In addition, pt may request outpatient RD appointment.      Yudy Mai RD, MS, LD  6B- Pager: [phone]

## 2018-05-02 NOTE — PROGRESS NOTES
Cardiology Daily Progress Note   05/02/2018    Sohan Rendon MRN# [id number]   Age: 68 year old YOB: 1950        Subjective:   No major changes overnight. Daughter at bedside and translates  Still having abdominal pain and hematuria.  Breathing comfortably.    LVAD power spikes as noted on initial consult note.     Physical Exam:   Ranges for vital signs:    Temp:  [97.6  F (36.4  C)-98.4  F (36.9  C)] 97.7  F (36.5  C)  Heart Rate:  [59-86] 60  Resp:  [11-23] 16  BP: ()/(73-90) 95/73  SpO2:  [93 %-100 %] 99 %    Intake/Output Summary (Last 24 hours) at 05/02/18 1503  Last data filed at 05/02/18 1300   Gross per 24 hour   Intake              980 ml   Output             1625 ml   Net             -645 ml       Exam:  Gen: lying comfortably in bed  CV: LVAD hum  Resp: bilateral air movement, blunted at bases  Abd: soft, mild diffuse TTP  Ext: warm, trace BLE edema  Neuro: awake, alert     Labs:     CMP  Recent Labs  Lab 05/02/18  0500 05/02/18  0049 05/01/18  1657    138 137   POTASSIUM 4.0 4.1 4.2   CHLORIDE 113* 112* 110*   CO2 17* 21 21   ANIONGAP 9 5 6   * 160* 136*   BUN 26 27 26   CR 2.05* 2.10* 2.14*   GFRESTIMATED 33* 32* 31*   GFRESTBLACK 39* 38* 37*   JOSÉ 8.2* 7.9* 8.3*   MAG 1.9 2.0  --    PROTTOTAL  --  7.1 7.4   ALBUMIN  --  2.9* 3.1*   BILITOTAL  --  0.9 0.9   ALKPHOS  --  93 100   AST  --  148* 146*   ALT  --  24 26     CBC  Recent Labs  Lab 05/02/18  0740 05/02/18  0049 05/01/18  1657   WBC 8.0 8.1 7.2   RBC 3.37* 3.34* 3.58*   HGB 9.2* 9.1* 9.9*   HCT 30.1* 29.6* 31.7*   MCV 89 89 89   MCH 27.3 27.2 27.7   MCHC 30.6* 30.7* 31.2*   RDW 21.7* 21.4* 21.2*    162 175     INR  Recent Labs  Lab 05/02/18  0500 05/02/18  0049 05/01/18  1657 04/30/18   INR 2.90* 2.93* 2.98* 2.8     Arterial Blood GasNo lab results found in last 7 days.    Medications reviewed         Assessment and Plan:   Assessment:  Mr. Rendon is a 69 yo male with ICM s/p DT LVAD with chronic VAD  thrombus who was admitted with pancreatitis and hematuria.     Problem list:  NICM  S/p LVAD, HM2 at destination therapy  Chronic LVAD thrombus  Hematuria with few RBCs consistent with hemolysis related to LVAD thrombus  Pancreatitis  S/p MVR  Multiple strokes  History of GI bleeding  HTN  HLD  CKD 3    The power spikes, elevated LDH, hematuria are consistent with ongoing LVAD thrombus. Patient is not a candidate for pump exchange or transplant. After discussion with his primary cardiologist, Dr. Lemons, recommend stopping heparin (high risk for bleeding), treat with home regimen of warfarin. Ok to discharge after treatment of pancreatitis.     Recommendations:   - stop heparin gtt  - cont warfarin with goal INR 1.8-2.5  - stop checking LDH  - continue home heart failure regimen     Patient and plan of care discussed with Dr. Aviles.  Cardiology will follow peripherally. Please call with any further questions or concerns.     Shaun Morgan MD  Cardiovascular Medicine Fellow, PGY-6  [phone]

## 2018-05-03 NOTE — PLAN OF CARE
Problem: Patient Care Overview  Goal: Plan of Care/Patient Progress Review  Outcome: No Change  BP 98/53 (BP Location: Right arm)  Temp 97.8  F (36.6  C) (Oral)  Resp 16  Wt 86.8 kg (191 lb 5.8 oz)  SpO2 98%  BMI 29.1 kg/m2  Neuro: A&Ox4. Ukrainian speaking. Family waived  services.   Cardiac: LVAD HD2. VSS.   Respiratory: Sating at 98% on RA.  GI/:  [place] In place- Adequate urine output. No Bm this shift.   Diet/appetite: Tolerating full liquid diet.  Activity:  Assist of2 and lift. Turned frequently , family at bedside.   Pain: At acceptable level on current regimen. Tylenol given x1 for headache.   Skin: No new deficits noted.  LDA's:  2 piv    Plan: Continue with POC. Notify primary team with changes.

## 2018-05-03 NOTE — PROGRESS NOTES
Indication of Interrogation:  Admission    Type of VAD:  Heartmate 2    Current Parameters:  Flow= --- lpm, Speed= 8800 rpm, Power= 4.7 capps, PI (if applicable)= 4.8    Abnormal Alarm on History:  No    Abnormal Events/Parameters Notes:  No    Changes Made during Interrogation:  No

## 2018-05-03 NOTE — PROGRESS NOTES
Visited with pt/family on the basis triaged for [facility] for spiritual support for the pt/family. Reflected with pt/family around their hospital experience, sources of spiritual and emotional support and current spiritual health needs. Pt s daughter talked about her dad current situation and what it means for her and her dad. During my visit, pt was laying down on his bed. His daughter was with him in the room. During our conversation with pt. s daughter, reported that, she worries about the health of her dad. Her dad has been in and out last 3 years.  I let them know that I could be of support of the pt and his family.  I would be able to coordinate and participate as a spiritual support for both him and his family. Pt receives support from his children and his extended family. Pt/family reported that their tanya is important to them and hope for the future and trust in God.  Emotional support. Reflective conversation integrating illness elements and family spiritual narratives.  I shared and reading conversation that would invite God into the room and to bless those present, support them in their suffering. I provided a special prayer asking of God to help their son and to ease and eliminate any suffering and pain that they feel. I gave Islamic Prayer Booklet.     Pt/family received spiritual support and reflective conversation in the context of this hospitalization. The pt/family expressed appreciation for the visit and the encouragement that they felt that they have God s support in their struggles. They requested ongoing Confucianism  support.  PLAN: Will continue to provide support to pt/family during their hospitalization at least 1x/wk

## 2018-05-03 NOTE — DISCHARGE SUMMARY
[facility], [place]    Internal Medicine Discharge Summary- Gold Service    Date of Admission:  5/1/2018  Date of Discharge:  5/3/2018  Discharging Provider: Holland Chavira  Discharge Team: Gold 5    Discharge Diagnoses     Abdominal discomfort  Elevated lipase in absence of clinical evidence of pancreatitis  S/p LVAD  Hemoglobinuria  LVAD-mediated hemolysis  Chronic LVAD thrombus  Chronic systolic heart failure without acute exacerbation      Follow-ups Needed After Discharge     - Follow INR    - Routine LVAD follow up, with management of know LVAD thrombus    Hospital Course   Sohan Rendon was admitted on 5/1/2018. He initially presented because he had noticed a pink tinge to his urine while at home. At the time of admission, he also described abdominal discomfort in the upper abdomen. In the ED, he was found to have lipase level of 2212. He was admitted for management of abdominal pain and suspected hematuria.  The following problems were addressed during his hospitalization:    # Abdominal discomfort  # Elevated lipase without clinical evidence of pancreatitis  CT of abdomen showed no evidence of pancreatitis. A CBD stent was in place with no associated biliary or pancreatic duct dilation. AST was elevated, but other liver function/hepatitis assays were normal, and AST elevation was explicable by ongoing LVAD-mediated hemolysis (see below). Abdominal discomfort improved quickly with supportive care. Ddx includes infectious gastroenteritis, duodenal ulceration, low level gut ischemia asstd with decreased CO caused by pump thrombus.  - Patient will continue PPI after discharge  - Tolerating diet at time of discharge    # LVAD thrombus  # Hemolysis  # Hemoglobinuria  - Initially started on heparin gtt per cardiology recommendations, in addition to warfarin. (INR therapeutic at time of admission.)  - Heparin held on hospital day 1 per cardiology recommendations. No additional  treatment needed.   - Dr. Dung Morgan has updated LVAD coordinator to arrange for post-d/c follow up  - I counselled patient and family for warning signs that should prompt return to ED, including gross hematuria and difficulty passing urine.     # Urinary retention: A Mcarthur catheter was placed at time of admit. It was d/c'ed on day of discharge, and patient was able to void spontaneously prior to discharge with low PVR.   - Continue tamsulosin and finasteride after d/c    # Discharge Pain Plan:   - Patient currently has NO PAIN and is not being prescribed pain medications on discharge. He will continue home pain regimen after discharge.     Consultations This Hospital Stay   CARDIAC REHAB IP CONSULT  NUTRITION SERVICES ADULT IP CONSULT  PHARMACY TO DOSE WARFARIN  CARDIOLOGY GENERAL ADULT IP CONSULT  VASCULAR ACCESS CARE ADULT IP CONSULT  VASCULAR ACCESS ADULT IP CONSULT  PHARMACY TO DOSE WARFARIN     Time Spent on this Encounter   I, Holland Chavira, personally saw the patient today and spent greater than 30 minutes discharging this patient.       Holland Chavira  Internal Medicine Staff Hospitalist Service  [facility]    ______________________________________________________________________    Physical Exam   Vital Signs: Temp: 97.9  F (36.6  C) Temp src: Oral BP: 97/81   Heart Rate: 66 Resp: 16 SpO2: 97 % O2 Device: None (Room air)    Weight: 191 lbs 5.75 oz    Sitting up in bed in NAD  Alert.  Cor with continuous sound of LVAD  Normal resp rate and effort. Lungs CTA.   Abdomen slightly distended. No TTP or peritoneal signs. Bowel sounds obscured by LVAD.   LUE contracted.  No LE edema. No edema of hips.   Drive line dressing c/d/i.     Significant Results and Procedures   Results for orders placed or performed during the hospital encounter of 05/01/18   Abdomen US, limited (RUQ only)    Narrative    EXAMINATION: Limited Abdominal Ultrasound, 5/1/2018 7:45 PM     COMPARISON: CT 3/18/2018,  ultrasound 5/7/2017    HISTORY: Acute pancreatitis, evaluate for gallstones/obstruction    FINDINGS:   Fluid: No evidence of ascites or pleural effusions.    Liver: The liver demonstrates normal echotexture, measuring 12.6 cm in  craniocaudal dimension. There is no focal mass. The left lobe is not  well seen due to overlying left ventricular assist device and  dressings.    Gallbladder: Cholecystectomy.    Bile Ducts: No evidence of intrahepatic biliary dilatation.  The  common bile duct measures 6 mm in diameter. The distal duct is not  well seen. No evidence of filling defect. The stent is not well seen.    Pancreas: Poorly visualized.    Kidney: The right kidney measures 9.9 cm long. There is no  hydronephrosis or hydroureter, no shadowing renal calculi, or solid  mass. There is a 3.7 cm simple cyst of the upper pole, not  significantly changed from previous imaging.       Impression    IMPRESSION:   1.  No intrahepatic or extrahepatic biliary dilatation or evidence of  choledocholithiasis.  2.  Exam is somewhat limited by left ventricular assist device and  dressings, making visualization of the pancreas, distal common bile  duct, and left hepatic lobe difficult.    I have personally reviewed the examination and initial interpretation  and I agree with the findings.    HUNTER SEPULVEDA MD   CT Abdomen Pelvis w/o Contrast    Narrative    EXAMINATION: TEMPORARY, 5/1/2018 10:08 PM    TECHNIQUE:  Helical CT images from the lung bases through the  symphysis pubis were obtained  without IV contrast.     COMPARISON: CT abdomen/pelvis 3/18/2018    HISTORY: Acute pancreatitis, right upper quadrant ultrasound shows no  gallstones, patient has renal insufficiency with creatinine of 2.14,  please evaluate for pancreatic lesion/mass    FINDINGS:  Abdomen/pelvis: Left upper quadrant left ventricular assist device  contributes to streak artifact which partially obscures visualization  of the upper abdomen. The LVAD device and  its stripe line are  unremarkable in appearance.    Fatty infiltration of the pancreas which is otherwise normal,  acknowledging limited visualization. No significant peripancreatic fat  stranding. No definite pancreatic lesion or fluid collection.  Gallbladder is surgically absent. Scattered foci of intrahepatic  pneumobilia with common bile duct stent in place. No intrahepatic or  extra hepatic biliary ductal dilatation. Lobulated appearance to the  spleen is unchanged over the course of multiple prior exams, not  enlarged. Normal adrenals. Simple fluid attenuation exophytic cysts  arising from both kidneys are not significantly changed. Asymmetric  atrophy of the right kidney, unchanged. No hydronephrosis normal  urinary bladder and prostate gland. Small sliding-type hiatal hernia.  No dilated small or large bowel. Scattered colonic diverticulosis  without diverticulitis. Normal appendix. No free air, free fluid,  pneumatosis, or portal venous gas. No abdominal aortic aneurysm.    Lung bases: Mild bibasilar atelectasis, left greater than right. No  pleural or pericardial effusion. Heart size is enlarged. Extensive  coronary artery calcification and annular mitral valve calcification.  Partially visualized median sternotomy wires.    Bones and soft tissues: Degenerative changes in the lower lumbar  spine. Chronic right femoral subcapital fracture unchanged in  appearance. No acute-appearing or suspicious osseous lesion surgical  clips in the right groin. Diffuse muscular atrophy.      Impression    IMPRESSION:   1. Suboptimal visualization of the pancreas secondary to lack of  intravenous contrast and streak artifact from left upper quadrant left  ventricular assist device. No convincing CT findings of acute  pancreatitis or evidence of pancreatic mass lesion or fluid  collection. There is fatty infiltration and atrophy of the pancreas.   2. Stable positioning of common bile duct stent without intrahepatic  or  extra hepatic biliary ductal dilatation. Gallbladder is surgically  absent.  3. Unchanged asymmetric atrophy of the right kidney and bilateral  simple renal cysts.  4. 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  5. Diverticulosis without diverticulitis.    I have personally reviewed the examination and initial interpretation  and I agree with the findings.    ISRAEL PEOPLES MD     *Note: Due to a large number of results and/or encounters for the requested time period, some results have not been displayed. A complete set of results can be found in Results Review.       Pending Results        Primary Care Physician   Shilpi Agosto    Discharge Disposition   Discharged to home  Condition at discharge: Stable    Discharge Orders     Medication Therapy Management Referral     INR Clinic Referral     Reason for your hospital stay   Dear Mr. Rendon - You were in the hospital for abdominal discomfort and pink urine. Your abdominal discomfort improved without specific treatments, and may have been due to irritation of the stomach. Please continue taking pantoprazole, as it may help with these symptoms in the future. The pink urine was caused by effects of your LVAD on your red blood cells, and was not dangerous. At the time you went home, your urine color had returned to normal.     Follow Up and recommended labs and tests   - Please keep your currently scheduled appointments with your cardiologist (heart doctor) and anticoagulation clinic (coumadin clinic).  - Your anticoagulation clinic will tell you when you need INR (coumadin level) to be rechecked.   - Your primary care doctor will visit you at your home.  - Your LVAD coordinator has been updated on your hospital stay and will make sure that you have appropriate follow up appointments scheduled.     Activity   Your activity upon discharge: activity as tolerated with assistance for transfers     When to contact your care team   Call your primary  doctor if you have any of the following:  increased shortness of breath, chest pain, red urine or difficulty urinating.     Discharge Instructions   You will be given a dose of coumadin in the hospital on Thursday May 3.   Please resume your home coumadin dose on Friday May 4.     Diet   Follow this diet upon discharge: Low sodium/low salt diet.       Discharge Medications   Current Discharge Medication List      CONTINUE these medications which have NOT CHANGED    Details   acetaminophen (TYLENOL) 325 MG tablet Take 2 tablets (650 mg) by mouth every 6 hours as needed for mild pain  Qty: 100 tablet, Refills: 0      furosemide (LASIX) 20 MG tablet Take 1 tablet (20 mg) by mouth as needed  Qty: 20 tablet, Refills: 11    Associated Diagnoses: Chronic systolic congestive heart failure (H)      levETIRAcetam (KEPPRA) 750 MG tablet TAKE 1 TABLET (750 MG) BY MOUTH 2 TIMES DAILY  Qty: 180 tablet, Refills: 3    Associated Diagnoses: Convulsions, unspecified convulsion type (H)      loratadine (CLARITIN) 10 MG tablet TAKE 1 TABLET (10 MG) BY MOUTH DAILY  Qty: 90 tablet, Refills: 2    Associated Diagnoses: Allergic rhinitis      losartan (COZAAR) 25 MG tablet Take 1 tablet (25 mg) by mouth daily  Qty: 15 tablet, Refills: 3    Associated Diagnoses: Chronic systolic congestive heart failure (H)      MELATONIN PO Take 5 mg by mouth At Bedtime      Metoprolol Succinate (TOPROL XL PO) Take 50mg by mouth every morning. Take 25mg by mouth every evening.      oxyCODONE (ROXICODONE) 5 MG IR tablet Take 1 tablet (5 mg) by mouth every 4 hours as needed for moderate to severe pain  Qty: 30 tablet, Refills: 0    Associated Diagnoses: Closed fracture of neck of right femur with nonunion, subsequent encounter      PANTOPRAZOLE SODIUM PO Take 20 mg by mouth every morning (before breakfast)      senna-docusate (SENEXON-S) 8.6-50 MG per tablet Take 2 tablets by mouth 2 times daily as needed for constipation  Qty: 60 tablet, Refills: 1     Associated Diagnoses: Other constipation      simethicone (MYLICON) 80 MG chewable tablet Take 1 tablet (80 mg) by mouth 4 times daily  Qty: 180 tablet, Refills: 5    Associated Diagnoses: Slow transit constipation      tamsulosin (FLOMAX) 0.4 MG capsule Take 2 capsules (0.8 mg) by mouth daily  Qty: 60 capsule, Refills: 11    Associated Diagnoses: Incomplete bladder emptying      Thiamine HCl (VITAMIN B-1) 100 MG TABS TAKE 1 TABLET (100 MG) BY MOUTH DAILY  Qty: 90 tablet, Refills: 3    Associated Diagnoses: Cerebrovascular accident (CVA) due to embolism of right middle cerebral artery (H)      warfarin (COUMADIN) 1 MG tablet Take 2.5 tablets (2.5 mg) by mouth daily  Qty: 30 tablet, Refills: 1    Comments: Repeat INR Thursday  Associated Diagnoses: LVAD (left ventricular assist device) present (H)      ALEK CONTOUR test strip USE TO TEST BLOOD SUGAR 2 TIMES DAILY OR AS DIRECTED.  Qty: 100 strip, Refills: 2    Associated Diagnoses: Hypoglycemia      bisacodyl (DULCOLAX) 10 MG Suppository Place 1 suppository (10 mg) rectally daily as needed for constipation  Qty: 5 suppository, Refills: 1    Associated Diagnoses: Drug-induced constipation      blood glucose monitoring (ALEK MICROLET) lancets USE TO TEST BLOOD SUGAR 2 TIMES DAILY OR AS DIRECTED  Qty: 100 each, Refills: 2    Comments: WAO  Associated Diagnoses: Diabetes mellitus, type 2 (H)      blood glucose monitoring (NO BRAND SPECIFIED) lancets Use to test blood sugars 2 times daily or as directed.  Qty: 1 Box, Refills: 3    Associated Diagnoses: Diabetes mellitus, type 2 (H)      finasteride (PROSCAR) 5 MG tablet Take 1 tablet (5 mg) by mouth daily  Qty: 90 tablet, Refills: 3    Associated Diagnoses: Incomplete bladder emptying      ipratropium - albuterol 0.5 mg/2.5 mg/3 mL (DUONEB) 0.5-2.5 (3) MG/3ML neb solution Take 1 vial (3 mLs) by nebulization every 6 hours as needed for shortness of breath / dyspnea, wheezing or other  Qty: 360 mL, Refills: 11     Associated Diagnoses: Pulmonary atelectasis      ketotifen (ZADITOR/REFRESH ANTI-ITCH) 0.025 % SOLN ophthalmic solution Place 1 drop into both eyes 2 times daily  Qty: 1 Bottle, Refills: 11    Comments: Instructions in Lebanese if possible  Associated Diagnoses: Allergic conjunctivitis, bilateral      nitroglycerin (NITROSTAT) 0.4 MG SL tablet Place 1 tablet (0.4 mg) under the tongue every 5 minutes as needed for chest pain  Qty: 30 tablet, Refills: 1    Associated Diagnoses: Coronary artery disease involving native coronary artery with unstable angina pectoris (H)      nystatin (MYCOSTATIN) 478097 UNIT/GM POWD Apply to affected areas of skin in the groin and on the scrotum three times a day as needed.  Qty: 60 g, Refills: 3    Associated Diagnoses: Intertriginous dermatitis associated with moisture      !! ORDER FOR DME Equipment being ordered: Lift chair.    Please see indications and face-to-face encounter details in 2/3/15 Office Visit note.  Qty: 1 Device, Refills: 0    Associated Diagnoses: Fracture of femoral neck, right, closed, initial encounter; Stroke (H); CHF (congestive heart failure), NYHA class IV (H)      !! order for DME Equipment being ordered: Bilateral leg supports for manual wheelchair.  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 order for DME Equipment being ordered: Reclining manual w/c with bilateral elevating leg rests.  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 order for DME Equipment being ordered: Hospital Bed, fully electric.  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, subsequent encounter; Cerebral infarction due to embolism of right middle  cerebral artery (H); CHF (congestive heart failure), NYHA class IV, chronic, systolic (H)      !! order for DME Equipment being ordered: Wheelchair, manual.  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 order for DME Equipment being ordered: Wheelchair, manual, with elevated leg rests and tilt in space back.  Please fax to [facility]; I called them 11/26/16 and they requested the new order.  Face to face is in my 10/26/16 note.  Qty: 1 each, Refills: 0    Associated Diagnoses: 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); Displaced fracture of right femoral neck, closed, with nonunion, subsequent encounter      !! order for DME Equipment being ordered: Cushioned heel boots.  Qty: 2 each, Refills: 0    Associated Diagnoses: 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)      !! order for DME Bilateral heel protective cushioning boots.  Dx: previous stroke with left hemiplegia.  Duration of need: 99 months.  Qty: 2 each, Refills: 0    Associated Diagnoses: 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)      !! order for DME Equipment being ordered: Nebulizer  Qty: 1 each, Refills: 11    Associated Diagnoses: Pulmonary atelectasis       !! - Potential duplicate medications found. Please discuss with provider.        Allergies   Allergies   Allergen Reactions     Seasonal Allergies

## 2018-05-03 NOTE — PHARMACY-ANTICOAGULATION SERVICE
Clinical Pharmacy - Warfarin Dosing Consult     Pharmacy has been consulted to manage this patient s warfarin therapy.  Indication: LVAD/RVAD  Therapy Goal: Other - see comments (INR 1.8-2.5)  Warfarin Prior to Admission: Yes  Warfarin PTA Regimen: Per outpatient anticoagulation clinic plan 4/30: 3 mg; 5/1: 3 mg; 5/2: 4.5 mg; 5/3: 3 mg; 5/4: 3 mg; 5/5: 4.5 mg; 5/6: 3 mg -- then recheck INR on 5/7      INR   Date Value Ref Range Status   05/03/2018 3.29 (H) 0.86 - 1.14 Final   05/02/2018 2.90 (H) 0.86 - 1.14 Final     Chromogenic Factor 10   Date Value Ref Range Status   08/10/2014 24 (L) 70 - 130 % Final     Comment:     Therapeutic Range:  A Chromogenic Factor 10 level of approximately 20-40%   inversely correlates with an INR of 2-3 for patients receiving Warfarin.   Chromogenic Factor 10 levels below 20% indicate an INR greater than 3 and   levels above 40% indicate an INR less than 2.       Factor 2 Assay   Date Value Ref Range Status   07/21/2014 25 (L) 60 - 140 % Final     Comment:     Analyte Specific Reagents (ASRs) are used in many laboratory tests necessary   for   standard medical care and generally do not require FDA approval.  This test   was   developed and its performance characteristics determined by [facility].  It has not been cleared or approved by   the [place] Food and Drug Administration.         Spoke with Dr. Chavira to clarify goal INR.  Original pharmacy consult endorsed goal INR of 2.5-3.  However, outpatient goal INR was 1.8-2.5 and Cardiology note from Dr. Dung Morgan on 5/2/18 endorses goal INR of 1.8-2.5.  Dr. Chavira clarifies that goal INR is 1.8-2.5 so pharmacy consult was updated.    Patient also may discharge today.  The patient received 2 doses on 5/2 -- 3 mg at 00:48 (considered his dose for 5/1) and 3 mg at 18:34.    Given today' INR of 3.29 recommend warfarin 1 mg today -- will be given at 2pm in anticipation of discharge today.  Pharmacy  will monitor Sohan Rendon daily and order warfarin doses to achieve specified goal.  If patient should discharge today would consider INR check on 5/4.  However, if that is not feasible, could consider the above plan per the outpatient anticoagulation clinic with INR recheck on 5/7.    Please contact pharmacy as soon as possible if the warfarin needs to be held for a procedure or if the warfarin goals change.      Shemar Leblanc, PharmD -- pager [phone]

## 2018-05-03 NOTE — PLAN OF CARE
Problem: Patient Care Overview  Goal: Plan of Care/Patient Progress Review  Outcome: No Change  BP 97/81 (BP Location: Right arm)  Temp 97.9  F (36.6  C) (Oral)  Resp 16  Wt 86.8 kg (191 lb 5.8 oz)  SpO2 97%  BMI 29.1 kg/m2    VSS. BPs soft but stable. RA. Paced rhythm. HM 2 LVAD, no alarms. A&Ox4, L side hemiparesis. Tolerating diet. Voided post truong removal. Turn and reposition as able. Family at bedside and very helpful. Plan to d/c this afternoon to home. Continue to monitor.

## 2018-05-04 NOTE — NURSING NOTE
Chief Complaint   Patient presents with     Follow Up For     Vad Follow Up      Vitals were taken and medications were reconciled.   Deirdre Cruz RMA  1:02 PM

## 2018-05-04 NOTE — MR AVS SNAPSHOT
[facility]   5/4/2018   Anticoagulation Therapy Visit    Description:  68 year old male   Provider:  Sarah Martinez, RN   Department:  [facility]           INR as of 5/4/2018     Today's INR       Anticoagulation Summary as of 5/4/2018     INR goal    Prior goal 1.8-2.5   Today's INR    Full instructions 5/4: 3 mg; 5/5: 4.5 mg; 5/6: 3 mg   Next INR check 5/4/2018    Indications   LVAD (left ventricular assist device) present [Z95.811]  Long-term (current) use of anticoagulants [Z79.01] [Z79.01]         May 2018 Details    Sun Mon Tue Wed Thu Fri Sat       1               2               3               4      See details      5                 6               7               8               9               10               11               12                 13               14               15               16               17               18               19                 20               21               22               23               24               25               26                 27               28               29               30               31                  Date Details   05/04 This INR check       Date of next INR:  5/4/2018         How to take your warfarin dose     To take:  3 mg Take 1 of the 3 mg tablets.

## 2018-05-04 NOTE — PROGRESS NOTES
[facility] Care Coordination Contact  CM left two messages with client's daughter Irene and another message with daughter Daisha Ruffin requesting a call back to follow up on client after discharge from the hospital. No return calls so far. Client did go to cardiology clinic today for follow up.   Nena Salazar RN  [facility]   [phone]

## 2018-05-04 NOTE — MR AVS SNAPSHOT
[facility]   5/4/2018   Anticoagulation Therapy Visit    Description:  68 year old male   Provider:  James Nickerson   Department:  [facility]           INR as of 5/4/2018     Today's INR 2.8!      Anticoagulation Summary as of 5/4/2018     INR goal    Prior goal 1.8-2.5   Today's INR 2.8!   Full instructions 5/4: 1.5 mg; 5/5: 3 mg; 5/6: 3 mg   Next INR check 5/7/2018    Indications   LVAD (left ventricular assist device) present [Z95.811]  Long-term (current) use of anticoagulants [Z79.01] [Z79.01]         May 2018 Details    Sun Mon Tue Wed Thu Fri Sat       1               2               3               4      1.5 mg   See details      5      3 mg           6      3 mg         7            8               9               10               11               12                 13               14               15               16               17               18               19                 20               21               22               23               24               25               26                 27               28               29               30               31                  Date Details   05/04 This INR check       Date of next INR:  5/7/2018         How to take your warfarin dose     To take:  1.5 mg Take 0.5 of a 3 mg tablet.    To take:  3 mg Take 1 of the 3 mg tablets.

## 2018-05-04 NOTE — PROGRESS NOTES
Dates of hospitalization: 5/1/18 to 5/3/18  Reason for hospitalization: You were in the hospital for abdominal discomfort and pink urine. Your abdominal discomfort improved without specific treatments, and may have been due to irritation of the stomach. Please continue taking pantoprazole, as it may help with these symptoms in the future. The pink urine was caused by effects of your LVAD on your red blood cells, and was not dangerous. At the time you went home, your urine color had returned to normal.    Procedures performed: No  Vitamin K or FFP administered? No  Inpatient warfarin doses added to calendar? Yes  Medication changes at discharge: Ketotifen 0.025% 1 drop into eyes BID  Warfarin dosing after DC: 1mg 5/3  Patient discharged on Lovenox? No  Next INR date: Friday 5/4  Where is the patient discharging to? (home, TCU, staying locally, etc.): Home via [facility] transportation with daughter  Will patient have home care? Resume [facility]

## 2018-05-04 NOTE — NURSING NOTE
1). PUMP DATA  Primary controller serial number: [id number]     II:   Flow: --- L/min,    Speed: 8800 RPMs,     PI: 5.8 ,  Power: 5.9 Slaughter,      Primary controller   Back up battery: Patient use: NA, Replace in: NA Months     Data downloaded: No   Equipment and driveline assessed for damage: Yes     Back up : Serial number: [id number]  Back up battery: Patient use: NA Replace in: NA  Months  Programmed settings identical to the settings on the primary controller :Yes      Education complete: Yes   Charge the BACKUP controller s backup battery every 6 months  Perform a self test on BACKUP every 6 months  Change the MPU s batteries every 6 months: NA  Have equipment serviced yearly (if applicable):Yes      2). ALARMS  Alarms reported by patient since last pump evaluation: No  Alarms or other finding noted during pump data history and alarm download: YES. There were several PI events but only a few of them had speed drops down to 8500. No alarms.  Action Taken:  Reviewed data with patient: Yes      3). DRESSING CHANGE / DRIVELINE ASSESSMENT  Dressing change completed today: No  Appearance of Driveline site: family member reports the driveline site is good    Driveline stabilization: Method: [employer]   Teaching reinforced on need for stabilization of Driveline.

## 2018-05-04 NOTE — LETTER
5/4/2018      RE: Sohan Rendon  [address]   [facility] [address]       Dear Colleague,    Thank you for the opportunity to participate in the care of your patient, Sohan Rendon, at the [facility] at [facility]. Please see a copy of my visit note below.    HPI:   Mr. 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 He underwent recent hospitalization at [facility] from 5/1-5/3/18 for acute abdominal pain with elevated lipase. US inconclusive from a pancreas standpoint and CT Abd/Pevlis notes no acute concerns with an again limited image due to limited visualization without contrast. Abdominal pain improved with supportive care and he tolerated oral intake prior to discharge. He presents to [facility] for hospital follow up.     He notes rare headache controlled on Tylenol. His daughter notes abdominal distention with last BM 2 days ago. He denies lightheadedness, dizziness, changes in speech, fever, chills, chest pain, SOB, HOOK, PND, cough, nausea, vomiting, diarrhea, melena, hematochezia, hematuria, and LE edema. He denies any concerns at his LVAD drive line site. They are unable to weigh him as he is bed bound.     VAD Interrogation on 5/4/2018: VAD interrogation reviewed with VAD coordinator. Agree with findings. No PI events, power spikes, speed drops, or other findings suspicious of pump malfunction noted.       PAST MEDICAL HISTORY:  Past Medical History:   Diagnosis Date      Acute right MCA stroke (H) 6/2013    With L sided hemiparesis      Anemia of chronic disease     Baseline Hb 8-9     BPH (benign prostatic hyperplasia)      CHF (congestive heart failure), NYHA class IV (H) 10/9/2012    s/p HeartMate II.  Was on milrinone and dobutamine prior to LVAD      CKD (chronic kidney disease)     Baseline Cr=     Clostridium difficile colitis 12/2012      Dysphagia     PEG tube placed in 2012.  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  Normal carotid duplex 3/2014.       Esophagitis 12/2012    EGD with esophagitis and multiple douenal ulcers     Fracture of femoral neck, right, closed (H) 2/3/2015    Presumed pathologic as patient is non-weight-bearing and suffered no trauma.  Family declined operative repair during hospital stay 1/23 - 1/30/15.     Gastric and duodenal angiodysplasia with hemorrhage 6/18/2015     GERD (gastroesophageal reflux disease)      Gout      Hematuria      HTN (hypertension)     LDL=59 (3/29/2014)     Hyperlipaemia      Ischemic cardiomyopathy      Mitral regurgitation     s/p MVR with [employer] ring      Myocardial infarction 1998    In [facility] without intervention      Nonsenile cataract     BE     PFO (patent foramen ovale)     s/p closure (10/9/2012)     TB lung, latent     s/p 9 months INH in 2012        FAMILY HISTORY:  Family History   Problem Relation Age of Onset     Family History Negative No family hx of      Glaucoma No family hx of      Macular Degeneration No family hx of      CANCER No family hx of      no skin cancer       SOCIAL HISTORY:  Social History     Social History     Marital status:      Spouse name: N/A     Number of children: N/A     Years of education: N/A     Social History Main Topics     Smoking status: Former Smoker     Smokeless tobacco: Former User     Alcohol use No     Drug use: No     Sexual activity: Not on file     Other  Topics Concern     Not on file     Social History Narrative       CURRENT MEDICATIONS:  Outpatient Medications Prior to Visit   Medication Sig Dispense Refill     acetaminophen (TYLENOL) 325 MG tablet Take 2 tablets (650 mg) by mouth every 6 hours as needed for mild pain 100 tablet 0     ALEK CONTOUR test strip USE TO TEST BLOOD SUGAR 2 TIMES DAILY OR AS DIRECTED. 100 strip 2     bisacodyl (DULCOLAX) 10 MG Suppository Place 1 suppository (10 mg) rectally daily as needed for constipation 5 suppository 1     blood glucose monitoring (ALEK MICROLET) lancets USE TO TEST BLOOD SUGAR 2 TIMES DAILY OR AS DIRECTED 100 each 2     blood glucose monitoring (NO BRAND SPECIFIED) lancets Use to test blood sugars 2 times daily or as directed. 1 Box 3     finasteride (PROSCAR) 5 MG tablet Take 1 tablet (5 mg) by mouth daily 90 tablet 3     furosemide (LASIX) 20 MG tablet Take 1 tablet (20 mg) by mouth as needed 20 tablet 11     ipratropium - albuterol 0.5 mg/2.5 mg/3 mL (DUONEB) 0.5-2.5 (3) MG/3ML neb solution Take 1 vial (3 mLs) by nebulization every 6 hours as needed for shortness of breath / dyspnea, wheezing or other 360 mL 11     ketotifen (ZADITOR/REFRESH ANTI-ITCH) 0.025 % SOLN ophthalmic solution Place 1 drop into both eyes 2 times daily (Patient taking differently: Place 1 drop into both eyes daily ) 1 Bottle 11     levETIRAcetam (KEPPRA) 750 MG tablet TAKE 1 TABLET (750 MG) BY MOUTH 2 TIMES DAILY 180 tablet 3     loratadine (CLARITIN) 10 MG tablet TAKE 1 TABLET (10 MG) BY MOUTH DAILY 90 tablet 2     losartan (COZAAR) 25 MG tablet Take 1 tablet (25 mg) by mouth daily 15 tablet 3     MELATONIN PO Take 5 mg by mouth At Bedtime       Metoprolol Succinate (TOPROL XL PO) Take 50mg by mouth every morning. Take 25mg by mouth every evening.       nitroglycerin (NITROSTAT) 0.4 MG SL tablet Place 1 tablet (0.4 mg) under the tongue every 5 minutes as needed for chest pain 30 tablet 1     nystatin (MYCOSTATIN) 822980 UNIT/GM POWD  Apply to affected areas of skin in the groin and on the scrotum three times a day as needed. 60 g 3     ORDER FOR DME Equipment being ordered: Lift chair.    Please see indications and face-to-face encounter details in 2/3/15 Office Visit note. 1 Device 0     order for DME Equipment being ordered: Bilateral leg supports for manual wheelchair. 2 each 0     order for DME Equipment being ordered: Reclining manual w/c with bilateral elevating leg rests. 1 each 0     order for DME Equipment being ordered: Hospital Bed, fully electric. 1 each 0     order for DME Equipment being ordered: Wheelchair, manual. 1 each 0     order for DME Equipment being ordered: Wheelchair, manual, with elevated leg rests and tilt in space back.  Please fax to [employer]; I called them 11/26/16 and they requested the new order.  Face to face is in my 10/26/16 note. 1 each 0     order for DME Equipment being ordered: Cushioned heel boots. 2 each 0     order for DME Bilateral heel protective cushioning boots.  Dx: previous stroke with left hemiplegia.  Duration of need: 99 months. 2 each 0     order for DME Equipment being ordered: Nebulizer 1 each 11     oxyCODONE (ROXICODONE) 5 MG IR tablet Take 1 tablet (5 mg) by mouth every 4 hours as needed for moderate to severe pain 30 tablet 0     PANTOPRAZOLE SODIUM PO Take 20 mg by mouth every morning (before breakfast)       senna-docusate (SENEXON-S) 8.6-50 MG per tablet Take 2 tablets by mouth 2 times daily as needed for constipation 60 tablet 1     simethicone (MYLICON) 80 MG chewable tablet Take 1 tablet (80 mg) by mouth 4 times daily 180 tablet 5     tamsulosin (FLOMAX) 0.4 MG capsule Take 2 capsules (0.8 mg) by mouth daily 60 capsule 11     Thiamine HCl (VITAMIN B-1) 100 MG TABS TAKE 1 TABLET (100 MG) BY MOUTH DAILY 90 tablet 3     warfarin (COUMADIN) 1 MG tablet Take 2.5 tablets (2.5 mg) by mouth daily 30 tablet 1     No facility-administered medications prior to visit.        ROS:    CONSTITUTIONAL: Denies fever, chills, fatigue, or weight fluctuations.   HEENT: Denies headache, vision changes, and changes in speech.   CV: Refer to HPI.   PULMONARY:Denies shortness of breath, cough, or previous TB exposure.   GI:Denies nausea, vomiting, diarrhea, and abdominal pain. Bowel movements 2 days ago. He complains of abdominal distention, but denies pain.   :Denies urinary alterations, dysuria, urinary frequency, hematuria, and abnormal drainage.   EXT:Denies lower extremity edema.   SKIN:Denies abnormal rashes or lesions.   MUSCULOSKELETAL:Denies upper or lower extremity weakness and pain.   NEUROLOGIC:Denies lightheadedness, dizziness, seizures, or upper or lower extremity paresthesia.     EXAM:  BP (!) 90/0  Pulse 60  SpO2 96%  GENERAL: Appears alert and oriented times three.   HEENT: Eye symmetrical and free of discharge bilaterally. Mucous membranes moist and without lesions.  NECK: Supple and without lymphadenopathy. JVD lower 1/3 of neck upright.   CV: S1S2 present with LVAD hum.   RESPIRATORY: Respirations regular, even, and unlabored. Lungs CTA throughout.   GI: Soft and distended with hypoactive bowel sounds present in all quadrants. No tenderness, rebound, guarding. No organomegaly.   EXTREMITIES: No peripheral edema. 2+ bilateral pedal pulses.   NEUROLOGIC: Alert and orientated x 3. CN II-XII grossly intact. No focal deficits.   MUSCULOSKELETAL: No joint swelling or tenderness.   SKIN: No jaundice. No rashes or lesions. LVAD drive line covered.     Labs:  CBC RESULTS:  Lab Results   Component Value Date    WBC 7.6 05/07/2018    RBC 3.43 (L) 05/07/2018    HGB 9.7 (L) 05/07/2018    HCT 31.2 (L) 05/07/2018    MCV 91 05/07/2018    MCH 28.3 05/07/2018    MCHC 31.1 (L) 05/07/2018    RDW 22.6 (H) 05/07/2018     05/07/2018       CMP RESULTS:  Lab Results   Component Value Date     05/07/2018    POTASSIUM 4.0 05/07/2018    CHLORIDE 111 (H) 05/07/2018    CO2 22 05/07/2018     ANIONGAP 6 05/07/2018     (H) 05/07/2018    BUN 20 05/07/2018    CR 2.13 (H) 05/07/2018    GFRESTIMATED 31 (L) 05/07/2018    GFRESTBLACK 38 (L) 05/07/2018    JOSÉ 8.4 (L) 05/07/2018    BILITOTAL 0.6 05/04/2018    ALBUMIN 3.2 (L) 05/04/2018    ALKPHOS 93 05/04/2018    ALT 16 05/04/2018    AST 59 (H) 05/04/2018        INR RESULTS:  Lab Results   Component Value Date    INR 1.79 (H) 05/07/2018       Lab Results   Component Value Date    MAG 2.6 (H) 05/03/2018     Lab Results   Component Value Date    NTBNPI 811 08/27/2017     Lab Results   Component Value Date    NTBNP 1687 (H) 11/11/2015       Assessment and Plan:   Mr. 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 He presents to clinic for hospital follow up.     Chronic systolic congestive heart failure secondary to ICM s/p LVAD HM II as DT. Complicated by hemolysis, thrombus, hematuria, and multiple CVA's.   Stage D, NYHA Class III  ACEi/ARB Losartan 25 mg po daily.   BB Toprol XL 50 mg in AM and 25 mg at HS.   Aldosterone antagonist No, Contraindicated given recent CKD.   SCD prophylaxis ICD.   Fluid status: Near euvolemic state.   MAP: 90.  LDH: deferred given history.   Anticoagulation: INR 1.79. Continue Coumadin per AC.  Antiplatelet: ASA held due to hematuria.       HTN.   - Losartan 25 mg po daily      CAD and Hyperlipidemia. Continue Toprol XL.      CKD, Stage III.   - Cr 2.13. Stable from hospital stay.     Chronic LDH elevation. Likely secondary to Pump thrombus.   -  Coumadin as above.   - Defer ASA due to recurrent hematuria.     Follow up in Cardiology clinic in 1 month.     Zuleika Patterson  5/4/2018          CC  ADAM CLEMENTE

## 2018-05-04 NOTE — PROGRESS NOTES
Patient has clinic visit within 24-48 hours of Discharge so no post DC follow up call is needed            May 04, 2018 12:45 PM CDT   Ventricular Assist Device with CLAIRE Escobar [facility])     [address] [place] [address]  [address] [place] [id number] [phone]

## 2018-05-04 NOTE — MR AVS SNAPSHOT
"              After Visit Summary   5/4/2018    Sohan Rendon    MRN: [id number]           Patient Information     Date Of Birth          1950        Visit Information        Provider Department      5/4/2018 12:45 PM Zuleika Patterson APRN CNP; [employer] [facility]        Care Instructions    Medications:  1. No med changes    Follow-up:  1. Please follow up with cardiology in one month      Page the VAD Coordinator on call if you gain more than 3 lb in a day or 5 in a week. Please also page if you feel unwell or have alarms.     Great to see you in clinic today. To Page the VAD Coordinator on call, dial [phone] option #[phone] and ask to speak to the VAD coordinator on call.             Follow-ups after your visit        Who to contact     If you have questions or need follow up information about today's clinic visit or your schedule please contact [facility] directly at [phone].  Normal or non-critical lab and imaging results will be communicated to you by [employer], letter or phone within 4 business days after the clinic has received the results. If you do not hear from us within 7 days, please contact the clinic through [employer] or phone. If you have a critical or abnormal lab result, we will notify you by phone as soon as possible.  Submit refill requests through [email or web address] or call your pharmacy and they will forward the refill request to us. Please allow 3 business days for your refill to be completed.          Additional Information About Your Visit        [email or web address] Information     [email or web address] lets you send messages to your doctor, view your test results, renew your prescriptions, schedule appointments and more. To sign up, go to [email or web address] . Click on \"Log in\" on the left side of the screen, which will take you to the Welcome page. Then click on \"Sign up Now\" on the right side of the page.     You will be asked to enter the access code listed below, as well as some " personal information. Please follow the directions to create your username and password.     Your access code is: [email or web address]  Expires: 2018  3:27 PM     Your access code will  in 90 days. If you need help or a new code, please call your [facility] clinic or [phone].        Care EveryWhere ID     This is your Care EveryWhere ID. This could be used by other organizations to access your [facility] medical records  [id number]        Your Vitals Were     Pulse Pulse Oximetry                60 96%           Blood Pressure from Last 3 Encounters:   18 (!) 90/0   18 117/82   18 (!) 89/73    Weight from Last 3 Encounters:   18 86.8 kg (191 lb 5.8 oz)   18 94.3 kg (208 lb)   18 81.6 kg (180 lb)              Today, you had the following     No orders found for display         Today's Medication Changes          These changes are accurate as of 18  1:47 PM.  If you have any questions, ask your nurse or doctor.               These medicines have changed or have updated prescriptions.        Dose/Directions    ketotifen 0.025 % Soln ophthalmic solution   Commonly known as:  ZADITOR/REFRESH ANTI-ITCH   This may have changed:  when to take this   Used for:  Allergic conjunctivitis, bilateral        Dose:  1 drop   Place 1 drop into both eyes 2 times daily   Quantity:  1 Bottle   Refills:  11                Primary Care Provider Office Phone # Fax #    CLAIRE Rosas [phone]       [address] [place] [id number]        Equal Access to Services     [facility]: Hadii aad ku hadasho Soomaali, waaxda luqadaha, qaybta kaalmada adeegyada, willie dorman . So [facility] [phone].    ATENCIÓN: Si habla español, tiene a smith disposición servicios gratuitos de asistencia lingüística. Llame al [phone].    We comply with applicable federal civil rights laws and [place] laws. We do not discriminate on the basis of race,  color, national origin, age, disability, sex, sexual orientation, or gender identity.            Thank you!     Thank you for choosing [facility]  for your care. Our goal is always to provide you with excellent care. Hearing back from our patients is one way we can continue to improve our services. Please take a few minutes to complete the written survey that you may receive in the mail after your visit with us. Thank you!             Your Updated Medication List - Protect others around you: Learn how to safely use, store and throw away your medicines at www.disposemymeds.org.          This list is accurate as of 5/4/18  1:47 PM.  Always use your most recent med list.                   Brand Name Dispense Instructions for use Diagnosis    acetaminophen 325 MG tablet    TYLENOL    100 tablet    Take 2 tablets (650 mg) by mouth every 6 hours as needed for mild pain        ALEK CONTOUR test strip   Generic drug:  blood glucose monitoring     100 strip    USE TO TEST BLOOD SUGAR 2 TIMES DAILY OR AS DIRECTED.    Hypoglycemia       bisacodyl 10 MG Suppository    DULCOLAX    5 suppository    Place 1 suppository (10 mg) rectally daily as needed for constipation    Drug-induced constipation       * blood glucose monitoring lancets     1 Box    Use to test blood sugars 2 times daily or as directed.    Diabetes mellitus, type 2 (H)       * blood glucose monitoring lancets     100 each    USE TO TEST BLOOD SUGAR 2 TIMES DAILY OR AS DIRECTED    Diabetes mellitus, type 2 (H)       finasteride 5 MG tablet    PROSCAR    90 tablet    Take 1 tablet (5 mg) by mouth daily    Incomplete bladder emptying       furosemide 20 MG tablet    LASIX    20 tablet    Take 1 tablet (20 mg) by mouth as needed    Chronic systolic congestive heart failure (H)       ipratropium - albuterol 0.5 mg/2.5 mg/3 mL 0.5-2.5 (3) MG/3ML neb solution    DUONEB    360 mL    Take 1 vial (3 mLs) by nebulization every 6 hours as needed for shortness of  breath / dyspnea, wheezing or other    Pulmonary atelectasis       ketotifen 0.025 % Soln ophthalmic solution    ZADITOR/REFRESH ANTI-ITCH    1 Bottle    Place 1 drop into both eyes 2 times daily    Allergic conjunctivitis, bilateral       levETIRAcetam 750 MG tablet    KEPPRA    180 tablet    TAKE 1 TABLET (750 MG) BY MOUTH 2 TIMES DAILY    Convulsions, unspecified convulsion type (H)       loratadine 10 MG tablet    CLARITIN    90 tablet    TAKE 1 TABLET (10 MG) BY MOUTH DAILY    Allergic rhinitis       losartan 25 MG tablet    COZAAR    15 tablet    Take 1 tablet (25 mg) by mouth daily    Chronic systolic congestive heart failure (H)       MELATONIN PO      Take 5 mg by mouth At Bedtime        nitroGLYcerin 0.4 MG sublingual tablet    NITROSTAT    30 tablet    Place 1 tablet (0.4 mg) under the tongue every 5 minutes as needed for chest pain    Coronary artery disease involving native coronary artery with unstable angina pectoris (H)       nystatin [id number] UNIT/GM Powd    MYCOSTATIN    60 g    Apply to affected areas of skin in the groin and on the scrotum three times a day as needed.    Intertriginous dermatitis associated with moisture       order for DME     1 Device    Equipment being ordered: Lift chair.  Please see indications and face-to-face encounter details in 2/3/15 Office Visit note.    Fracture of femoral neck, right, closed, initial encounter, Stroke (H), CHF (congestive heart failure), NYHA class IV (H)       * order for DME     2 each    Equipment being ordered: Bilateral leg supports for manual wheelchair.    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  Please fax to [employer]; I called them 11/26/16 and they requested the new order.  Face to face is in my 10/26/16 note.    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  Dx: previous stroke with left hemiplegia.  Duration of need: 99 months.    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, subsequent encounter       PANTOPRAZOLE SODIUM PO      Take 20 mg by mouth every morning (before breakfast)        senna-docusate 8.6-50 MG per tablet    SENEXON-S    60 tablet    Take 2 tablets by mouth 2 times daily as needed for constipation    Other constipation       simethicone 80 MG chewable tablet    MYLICON     180 tablet    Take 1 tablet (80 mg) by mouth 4 times daily    Slow transit constipation       tamsulosin 0.4 MG capsule    FLOMAX    60 capsule    Take 2 capsules (0.8 mg) by mouth daily    Incomplete bladder emptying       thiamine 100 MG tablet     90 tablet    TAKE 1 TABLET (100 MG) BY MOUTH DAILY    Cerebrovascular accident (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 Take 25mg by mouth every evening.        warfarin 1 MG tablet    COUMADIN    30 tablet    Take 2.5 tablets (2.5 mg) by mouth daily    LVAD (left ventricular assist device) present (H)       * Notice:  This list has 9 medication(s) that are the same as other medications prescribed for you. Read the directions carefully, and ask your doctor or other care provider to review them with you.

## 2018-05-04 NOTE — PLAN OF CARE
Problem: Patient Care Overview  Goal: Plan of Care/Patient Progress Review  Outcome: Adequate for Discharge Date Met: 05/03/18  NEURO: A&Ox4  TELE: Paced rhythm  VITALS: Stable  CV: RRR, no edema, LVAD hum  PULM: LS CTA, diminished in the bases, sats stable on RA  GI: BS +, passing gas  : UOP adequate, retaining some urine--MD notified (see post-void residuals in flowsheets)  SKIN: Intact  LDA: PIVs removed  GTT: None  PAIN: HA, Tylenol given  ACTIVITY: Hemiparesis, bed bound, repositioned  PLAN: D[facility] [facility].  D/C instructions d/w daughter.

## 2018-05-04 NOTE — PROGRESS NOTES
ANTICOAGULATION FOLLOW-UP CLINIC VISIT    Patient Name:  Sohan Rendon  Date:  5/4/2018  Contact Type:  Telephone    SUBJECTIVE:     Patient Findings     Positives Hospital admission (Patient discharged to home on 5/3/18), Unexplained INR or factor level change (Supratherapeutic INR result is 2.8 today)    Comments Spoke to home care.  Recommended patient take 1.5mg today, then 3mg of Coumadin tomorrow and Sunday.  Will check on Monday.           OBJECTIVE    INR   Date Value Ref Range Status   05/04/2018 2.8  Final     Chromogenic Factor 10   Date Value Ref Range Status   08/10/2014 24 (L) 70 - 130 % Final     Comment:     Therapeutic Range:  A Chromogenic Factor 10 level of approximately 20-40%   inversely correlates with an INR of 2-3 for patients receiving Warfarin.   Chromogenic Factor 10 levels below 20% indicate an INR greater than 3 and   levels above 40% indicate an INR less than 2.       Factor 2 Assay   Date Value Ref Range Status   07/21/2014 25 (L) 60 - 140 % Final     Comment:     Analyte Specific Reagents (ASRs) are used in many laboratory tests necessary   for   standard medical care and generally do not require FDA approval.  This test   was   developed and its performance characteristics determined by [facility].  It has not been cleared or approved by   the [place] Food and Drug Administration.         ASSESSMENT / PLAN  INR assessment SUPRA    Recheck INR In: 3 DAYS    INR Location [facility] INR      Anticoagulation Summary as of 5/4/2018     INR goal    Prior goal 1.8-2.5   Today's INR 2.8!   Maintenance plan No maintenance plan   Full instructions 5/4: 1.5 mg; 5/5: 3 mg; 5/6: 3 mg   Weekly total 25.5 mg   Plan last modified Blanca Bah RN (4/5/2018)   Next INR check 5/7/2018   Priority INR   Target end date Indefinite    Indications   LVAD (left ventricular assist device) present [Z95.811]  Long-term (current) use of anticoagulants [Z79.01]  [Z79.01]         Anticoagulation Episode Summary     INR check location     Preferred lab     Send INR reminders to [facility]    Comments Goal Range is 1.8-2.3  [facility] comes out to draw INR  Yancy @ [phone]  Daughter Almaz Santiago [phone]      Anticoagulation Care Providers     Provider Role Specialty Phone number    Evon Lemons MD Responsible Cardiology [phone]            See the Encounter Report to view Anticoagulation Flowsheet and Dosing Calendar (Go to Encounters tab in chart review, and find the Anticoagulation Therapy Visit)    Spoke with home care nurse. Decreased dose today.  Recently discharged from hospital.  James Nickerson, RN

## 2018-05-04 NOTE — PROGRESS NOTES
HPI:   Mr. 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 He underwent recent hospitalization at [facility] from 5/1-5/3/18 for acute abdominal pain with elevated lipase. US inconclusive from a pancreas standpoint and CT Abd/Pevlis notes no acute concerns with an again limited image due to limited visualization without contrast. Abdominal pain improved with supportive care and he tolerated oral intake prior to discharge. He presents to [facility] for hospital follow up.     He notes rare headache controlled on Tylenol. His daughter notes abdominal distention with last BM 2 days ago. He denies lightheadedness, dizziness, changes in speech, fever, chills, chest pain, SOB, HOOK, PND, cough, nausea, vomiting, diarrhea, melena, hematochezia, hematuria, and LE edema. He denies any concerns at his LVAD drive line site. They are unable to weigh him as he is bed bound.     VAD Interrogation on 5/4/2018: VAD interrogation reviewed with VAD coordinator. Agree with findings. No PI events, power spikes, speed drops, or other findings suspicious of pump malfunction noted.       PAST MEDICAL HISTORY:  Past Medical History:   Diagnosis Date     Acute right MCA stroke (H) 6/2013    With L sided hemiparesis      Anemia of chronic disease     Baseline Hb 8-9     BPH (benign prostatic hyperplasia)      CHF (congestive heart failure), NYHA class IV (H) 10/9/2012    s/p HeartMate II.  Was on milrinone and dobutamine prior to LVAD      CKD (chronic kidney disease)      Baseline Cr=     Clostridium difficile colitis 12/2012      Dysphagia     PEG tube placed in 2012.  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  Normal carotid duplex 3/2014.       Esophagitis 12/2012    EGD with esophagitis and multiple douenal ulcers     Fracture of femoral neck, right, closed (H) 2/3/2015    Presumed pathologic as patient is non-weight-bearing and suffered no trauma.  Family declined operative repair during hospital stay 1/23 - 1/30/15.     Gastric and duodenal angiodysplasia with hemorrhage 6/18/2015     GERD (gastroesophageal reflux disease)      Gout      Hematuria      HTN (hypertension)     LDL=59 (3/29/2014)     Hyperlipaemia      Ischemic cardiomyopathy      Mitral regurgitation     s/p MVR with Carbomedics ring      Myocardial infarction 1998    In [facility] without intervention      Nonsenile cataract     BE     PFO (patent foramen ovale)     s/p closure (10/9/2012)     TB lung, latent     s/p 9 months INH in 2012        FAMILY HISTORY:  Family History   Problem Relation Age of Onset     Family History Negative No family hx of      Glaucoma No family hx of      Macular Degeneration No family hx of      CANCER No family hx of      no skin cancer       SOCIAL HISTORY:  Social History     Social History     Marital status:      Spouse name: N/A     Number of children: N/A     Years of education: N/A     Social History Main Topics     Smoking status: Former Smoker     Smokeless tobacco: Former User     Alcohol use No     Drug use: No     Sexual activity: Not on file     Other Topics Concern     Not on file     Social History Narrative       CURRENT MEDICATIONS:  Outpatient Medications Prior to Visit   Medication Sig Dispense Refill     acetaminophen (TYLENOL) 325 MG tablet Take 2 tablets (650 mg) by mouth every 6 hours as needed for mild pain 100 tablet 0     ALEK CONTOUR test strip USE TO  TEST BLOOD SUGAR 2 TIMES DAILY OR AS DIRECTED. 100 strip 2     bisacodyl (DULCOLAX) 10 MG Suppository Place 1 suppository (10 mg) rectally daily as needed for constipation 5 suppository 1     blood glucose monitoring (ALEK MICROLET) lancets USE TO TEST BLOOD SUGAR 2 TIMES DAILY OR AS DIRECTED 100 each 2     blood glucose monitoring (NO BRAND SPECIFIED) lancets Use to test blood sugars 2 times daily or as directed. 1 Box 3     finasteride (PROSCAR) 5 MG tablet Take 1 tablet (5 mg) by mouth daily 90 tablet 3     furosemide (LASIX) 20 MG tablet Take 1 tablet (20 mg) by mouth as needed 20 tablet 11     ipratropium - albuterol 0.5 mg/2.5 mg/3 mL (DUONEB) 0.5-2.5 (3) MG/3ML neb solution Take 1 vial (3 mLs) by nebulization every 6 hours as needed for shortness of breath / dyspnea, wheezing or other 360 mL 11     ketotifen (ZADITOR/REFRESH ANTI-ITCH) 0.025 % SOLN ophthalmic solution Place 1 drop into both eyes 2 times daily (Patient taking differently: Place 1 drop into both eyes daily ) 1 Bottle 11     levETIRAcetam (KEPPRA) 750 MG tablet TAKE 1 TABLET (750 MG) BY MOUTH 2 TIMES DAILY 180 tablet 3     loratadine (CLARITIN) 10 MG tablet TAKE 1 TABLET (10 MG) BY MOUTH DAILY 90 tablet 2     losartan (COZAAR) 25 MG tablet Take 1 tablet (25 mg) by mouth daily 15 tablet 3     MELATONIN PO Take 5 mg by mouth At Bedtime       Metoprolol Succinate (TOPROL XL PO) Take 50mg by mouth every morning. Take 25mg by mouth every evening.       nitroglycerin (NITROSTAT) 0.4 MG SL tablet Place 1 tablet (0.4 mg) under the tongue every 5 minutes as needed for chest pain 30 tablet 1     nystatin (MYCOSTATIN) 947230 UNIT/GM POWD Apply to affected areas of skin in the groin and on the scrotum three times a day as needed. 60 g 3     ORDER FOR DME Equipment being ordered: Lift chair.    Please see indications and face-to-face encounter details in 2/3/15 Office Visit note. 1 Device 0     order for DME Equipment being ordered: Bilateral leg supports  for manual wheelchair. 2 each 0     order for DME Equipment being ordered: Reclining manual w/c with bilateral elevating leg rests. 1 each 0     order for DME Equipment being ordered: Hospital Bed, fully electric. 1 each 0     order for DME Equipment being ordered: Wheelchair, manual. 1 each 0     order for DME Equipment being ordered: Wheelchair, manual, with elevated leg rests and tilt in space back.  Please fax to [facility]; I called them 11/26/16 and they requested the new order.  Face to face is in my 10/26/16 note. 1 each 0     order for DME Equipment being ordered: Cushioned heel boots. 2 each 0     order for DME Bilateral heel protective cushioning boots.  Dx: previous stroke with left hemiplegia.  Duration of need: 99 months. 2 each 0     order for DME Equipment being ordered: Nebulizer 1 each 11     oxyCODONE (ROXICODONE) 5 MG IR tablet Take 1 tablet (5 mg) by mouth every 4 hours as needed for moderate to severe pain 30 tablet 0     PANTOPRAZOLE SODIUM PO Take 20 mg by mouth every morning (before breakfast)       senna-docusate (SENEXON-S) 8.6-50 MG per tablet Take 2 tablets by mouth 2 times daily as needed for constipation 60 tablet 1     simethicone (MYLICON) 80 MG chewable tablet Take 1 tablet (80 mg) by mouth 4 times daily 180 tablet 5     tamsulosin (FLOMAX) 0.4 MG capsule Take 2 capsules (0.8 mg) by mouth daily 60 capsule 11     Thiamine HCl (VITAMIN B-1) 100 MG TABS TAKE 1 TABLET (100 MG) BY MOUTH DAILY 90 tablet 3     warfarin (COUMADIN) 1 MG tablet Take 2.5 tablets (2.5 mg) by mouth daily 30 tablet 1     No facility-administered medications prior to visit.        ROS:   CONSTITUTIONAL: Denies fever, chills, fatigue, or weight fluctuations.   HEENT: Denies headache, vision changes, and changes in speech.   CV: Refer to HPI.   PULMONARY:Denies shortness of breath, cough, or previous TB exposure.   GI:Denies nausea, vomiting, diarrhea, and abdominal pain. Bowel movements 2 days ago. He complains of  abdominal distention, but denies pain.   :Denies urinary alterations, dysuria, urinary frequency, hematuria, and abnormal drainage.   EXT:Denies lower extremity edema.   SKIN:Denies abnormal rashes or lesions.   MUSCULOSKELETAL:Denies upper or lower extremity weakness and pain.   NEUROLOGIC:Denies lightheadedness, dizziness, seizures, or upper or lower extremity paresthesia.     EXAM:  BP (!) 90/0  Pulse 60  SpO2 96%  GENERAL: Appears alert and oriented times three.   HEENT: Eye symmetrical and free of discharge bilaterally. Mucous membranes moist and without lesions.  NECK: Supple and without lymphadenopathy. JVD lower 1/3 of neck upright.   CV: S1S2 present with LVAD hum.   RESPIRATORY: Respirations regular, even, and unlabored. Lungs CTA throughout.   GI: Soft and distended with hypoactive bowel sounds present in all quadrants. No tenderness, rebound, guarding. No organomegaly.   EXTREMITIES: No peripheral edema. 2+ bilateral pedal pulses.   NEUROLOGIC: Alert and orientated x 3. CN II-XII grossly intact. No focal deficits.   MUSCULOSKELETAL: No joint swelling or tenderness.   SKIN: No jaundice. No rashes or lesions. LVAD drive line covered.     Labs:  CBC RESULTS:  Lab Results   Component Value Date    WBC 7.6 05/07/2018    RBC 3.43 (L) 05/07/2018    HGB 9.7 (L) 05/07/2018    HCT 31.2 (L) 05/07/2018    MCV 91 05/07/2018    MCH 28.3 05/07/2018    MCHC 31.1 (L) 05/07/2018    RDW 22.6 (H) 05/07/2018     05/07/2018       CMP RESULTS:  Lab Results   Component Value Date     05/07/2018    POTASSIUM 4.0 05/07/2018    CHLORIDE 111 (H) 05/07/2018    CO2 22 05/07/2018    ANIONGAP 6 05/07/2018     (H) 05/07/2018    BUN 20 05/07/2018    CR 2.13 (H) 05/07/2018    GFRESTIMATED 31 (L) 05/07/2018    GFRESTBLACK 38 (L) 05/07/2018    JOSÉ 8.4 (L) 05/07/2018    BILITOTAL 0.6 05/04/2018    ALBUMIN 3.2 (L) 05/04/2018    ALKPHOS 93 05/04/2018    ALT 16 05/04/2018    AST 59 (H) 05/04/2018        INR RESULTS:  Lab  Results   Component Value Date    INR 1.79 (H) 05/07/2018       Lab Results   Component Value Date    MAG 2.6 (H) 05/03/2018     Lab Results   Component Value Date    NTBNPI 811 08/27/2017     Lab Results   Component Value Date    NTBNP 1687 (H) 11/11/2015       Assessment and Plan:   Mr. 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 He presents to clinic for hospital follow up.     Chronic systolic congestive heart failure secondary to ICM s/p LVAD HM II as DT. Complicated by hemolysis, thrombus, hematuria, and multiple CVA's.   Stage D, NYHA Class III  ACEi/ARB Losartan 25 mg po daily.   BB Toprol XL 50 mg in AM and 25 mg at HS.   Aldosterone antagonist No, Contraindicated given recent CKD.   SCD prophylaxis ICD.   Fluid status: Near euvolemic state.   MAP: 90.  LDH: deferred given history.   Anticoagulation: INR 1.79. Continue Coumadin per AC.  Antiplatelet: ASA held due to hematuria.       HTN.   - Losartan 25 mg po daily      CAD and Hyperlipidemia. Continue Toprol XL.      CKD, Stage III.   - Cr 2.13. Stable from hospital stay.     Chronic LDH elevation. Likely secondary to Pump thrombus.   - Coumadin as above.   - Defer ASA due to recurrent hematuria.     Follow up in Cardiology clinic in 1 month.     Zuleika Patterson  5/4/2018          ADAM VIRGEN

## 2018-05-05 NOTE — PROGRESS NOTES
Patient left by stretcher and medical transport from the clinic to return home after his clinic appointment. It was discovered that his backup controller was left in the room. I called Almaz, the patient's daughter who said they could not return to get it. The medical  service was contacted to deliver the controller to the patient's apartment in [place]. The controller was picked up in the main lobby of the hospital at 7pm on 5/4/18.

## 2018-05-07 NOTE — MR AVS SNAPSHOT
Sohan Rendon   5/7/2018   Anticoagulation Therapy Visit    Description:  68 year old male   Provider:  Dafne Sanders, RN   Department:  [facility]           INR as of 5/7/2018     Today's INR 1.8      Anticoagulation Summary as of 5/7/2018     INR goal    Prior goal 1.8-2.5   Today's INR 1.8   Full instructions 5/7: 3 mg; 5/8: 4.5 mg; 5/9: 3 mg   Next INR check 5/10/2018    Indications   LVAD (left ventricular assist device) present [Z95.811]  Long-term (current) use of anticoagulants [Z79.01] [Z79.01]         May 2018 Details    Sun Mon Tue Wed Thu Fri Sat       1               2               3               4               5                 6               7      3 mg   See details      8      4.5 mg         9      3 mg         10            11               12                 13               14               15               16               17               18               19                 20               21               22               23               24               25               26                 27               28               29               30               31                  Date Details   05/07 This INR check       Date of next INR:  5/10/2018         How to take your warfarin dose     To take:  3 mg Take 1 of the 3 mg tablets.    To take:  4.5 mg Take 1.5 of the 3 mg tablets.

## 2018-05-07 NOTE — ED TRIAGE NOTES
called [facility] with possible stroke symptoms   (face looked different and didn't anwer daughter for a few minutes) which have sinced resolved,  also has nausa and vomiting , formerly had constipation but went in ambulance     Has headache and neck stiffness

## 2018-05-07 NOTE — PROGRESS NOTES
ANTICOAGULATION FOLLOW-UP CLINIC VISIT    Patient Name:  Sohan Rendon  Date:  5/7/2018  Contact Type:  Telephone    SUBJECTIVE:     Patient Findings     Positives Other complaints (abd pain)           OBJECTIVE    INR   Date Value Ref Range Status   05/07/2018 1.8  Final     Chromogenic Factor 10   Date Value Ref Range Status   08/10/2014 24 (L) 70 - 130 % Final     Comment:     Therapeutic Range:  A Chromogenic Factor 10 level of approximately 20-40%   inversely correlates with an INR of 2-3 for patients receiving Warfarin.   Chromogenic Factor 10 levels below 20% indicate an INR greater than 3 and   levels above 40% indicate an INR less than 2.       Factor 2 Assay   Date Value Ref Range Status   07/21/2014 25 (L) 60 - 140 % Final     Comment:     Analyte Specific Reagents (ASRs) are used in many laboratory tests necessary   for   standard medical care and generally do not require FDA approval.  This test   was   developed and its performance characteristics determined by [facility].  It has not been cleared or approved by   the [place] Food and Drug Administration.         ASSESSMENT / PLAN  INR assessment THER    Recheck INR In: 3 DAYS    INR Location Clinic      Anticoagulation Summary as of 5/7/2018     INR goal    Prior goal 1.8-2.5   Today's INR 1.8   Maintenance plan No maintenance plan   Full instructions 5/7: 3 mg; 5/8: 4.5 mg; 5/9: 3 mg   Weekly total 25.5 mg   Plan last modified Blanca Bah RN (4/5/2018)   Next INR check 5/10/2018   Priority INR   Target end date Indefinite    Indications   LVAD (left ventricular assist device) present [Z95.811]  Long-term (current) use of anticoagulants [Z79.01] [Z79.01]         Anticoagulation Episode Summary     INR check location     Preferred lab     Send INR reminders to [facility]    Comments Goal Range is 1.8-2.3  [facility] comes out to draw INR  Yancy @ [phone]  Daughter Almaz Jack  [phone]      Anticoagulation Care Providers     Provider Role Specialty Phone number    Evon Lemons MD Responsible Cardiology [phone]            See the Encounter Report to view Anticoagulation Flowsheet and Dosing Calendar (Go to Encounters tab in chart review, and find the Anticoagulation Therapy Visit)  Spoke with Susan.     Dafne Sanders RN

## 2018-05-07 NOTE — ED AVS SNAPSHOT
[facility], Emergency Department    [address] [address]    Phone:  [phone]                                       Sohan Rendon   MRN: [id number]    Department:  [facility], Emergency Department   Date of Visit:  5/7/2018           Patient Information     Date Of Birth          1950        Your diagnoses for this visit were:     Acute nonintractable headache, unspecified headache type     LVAD (left ventricular assist device) present (H)        You were seen by Mariaelena Mratinez MD.        Discharge Instructions       You have been seen in the emergency department for a headache.  You have had blood tests which are very normal for you.  Your CT scan is normal as well.  We recommend that you use Tylenol as needed to help with headaches at home.  Follow-up with your primary clinic as needed.    Discharge References/Attachments     HEADACHES, SELF-CARE FOR (ENGLISH)      24 Hour Appointment Hotline       To make an appointment at any [facility] clinic, call [phone] ([phone]). If you don't have a family doctor or clinic, we will help you find one. [facility] clinics are conveniently located to serve the needs of you and your family.             Review of your medicines      Our records show that you are taking the medicines listed below. If these are incorrect, please call your family doctor or clinic.        Dose / Directions Last dose taken    acetaminophen 325 MG tablet   Commonly known as:  TYLENOL   Dose:  650 mg   Quantity:  100 tablet        Take 2 tablets (650 mg) by mouth every 6 hours as needed for mild pain   Refills:  0        ALEK CONTOUR test strip   Quantity:  100 strip   Generic drug:  blood glucose monitoring        USE TO TEST BLOOD SUGAR 2 TIMES DAILY OR AS DIRECTED.   Refills:  2        bisacodyl 10 MG Suppository   Commonly known as:  DULCOLAX   Dose:  10 mg   Quantity:  5 suppository        Place 1 suppository (10 mg) rectally daily as needed for  constipation   Refills:  1        * blood glucose monitoring lancets   Quantity:  1 Box        Use to test blood sugars 2 times daily or as directed.   Refills:  3        * blood glucose monitoring lancets   Quantity:  100 each        USE TO TEST BLOOD SUGAR 2 TIMES DAILY OR AS DIRECTED   Refills:  2        finasteride 5 MG tablet   Commonly known as:  PROSCAR   Dose:  5 mg   Quantity:  90 tablet        Take 1 tablet (5 mg) by mouth daily   Refills:  3        furosemide 20 MG tablet   Commonly known as:  LASIX   Dose:  20 mg   Quantity:  20 tablet        Take 1 tablet (20 mg) by mouth as needed   Refills:  11        ipratropium - albuterol 0.5 mg/2.5 mg/3 mL 0.5-2.5 (3) MG/3ML neb solution   Commonly known as:  DUONEB   Dose:  1 vial   Quantity:  360 mL        Take 1 vial (3 mLs) by nebulization every 6 hours as needed for shortness of breath / dyspnea, wheezing or other   Refills:  11        ketotifen 0.025 % Soln ophthalmic solution   Commonly known as:  ZADITOR/REFRESH ANTI-ITCH   Dose:  1 drop   Quantity:  1 Bottle        Place 1 drop into both eyes 2 times daily   Refills:  11        levETIRAcetam 750 MG tablet   Commonly known as:  KEPPRA   Quantity:  180 tablet        TAKE 1 TABLET (750 MG) BY MOUTH 2 TIMES DAILY   Refills:  3        loratadine 10 MG tablet   Commonly known as:  CLARITIN   Quantity:  90 tablet        TAKE 1 TABLET (10 MG) BY MOUTH DAILY   Refills:  2        losartan 25 MG tablet   Commonly known as:  COZAAR   Dose:  25 mg   Quantity:  15 tablet        Take 1 tablet (25 mg) by mouth daily   Refills:  3        MELATONIN PO   Dose:  5 mg        Take 5 mg by mouth At Bedtime   Refills:  0        nitroGLYcerin 0.4 MG sublingual tablet   Commonly known as:  NITROSTAT   Dose:  0.4 mg   Quantity:  30 tablet        Place 1 tablet (0.4 mg) under the tongue every 5 minutes as needed for chest pain   Refills:  1        nystatin 431863 UNIT/GM Powd   Commonly known as:  MYCOSTATIN   Quantity:  60 g         Apply to affected areas of skin in the groin and on the scrotum three times a day as needed.   Refills:  3        order for DME   Quantity:  1 Device        Equipment being ordered: Lift chair.  Please see indications and face-to-face encounter details in 2/3/15 Office Visit note.   Refills:  0        * order for DME   Quantity:  2 each        Equipment being ordered: Bilateral leg supports for manual wheelchair.   Refills:  0        * order for DME   Quantity:  1 each        Equipment being ordered: Reclining manual w/c with bilateral elevating leg rests.   Refills:  0        * order for DME   Quantity:  1 each        Equipment being ordered: Hospital Bed, fully electric.   Refills:  0        * order for DME   Quantity:  1 each        Equipment being ordered: Wheelchair, manual.   Refills:  0        * order for DME   Quantity:  1 each        Equipment being ordered: Wheelchair, manual, with elevated leg rests and tilt in space back.  Please fax to [facility]; I called them 11/26/16 and they requested the new order.  Face to face is in my 10/26/16 note.   Refills:  0        * order for DME   Quantity:  2 each        Equipment being ordered: Cushioned heel boots.   Refills:  0        * order for DME   Quantity:  2 each        Bilateral heel protective cushioning boots.  Dx: previous stroke with left hemiplegia.  Duration of need: 99 months.   Refills:  0        order for DME   Quantity:  1 each        Equipment being ordered: Nebulizer   Refills:  11        oxyCODONE IR 5 MG tablet   Commonly known as:  ROXICODONE   Dose:  5 mg   Quantity:  30 tablet        Take 1 tablet (5 mg) by mouth every 4 hours as needed for moderate to severe pain   Refills:  0        PANTOPRAZOLE SODIUM PO   Dose:  20 mg        Take 20 mg by mouth every morning (before breakfast)   Refills:  0        senna-docusate 8.6-50 MG per tablet   Commonly known as:  SENEXON-S   Dose:  2 tablet   Quantity:  60 tablet        Take 2 tablets by mouth 2 times  daily as needed for constipation   Refills:  1        simethicone 80 MG chewable tablet   Commonly known as:  MYLICON   Dose:  80 mg   Quantity:  180 tablet        Take 1 tablet (80 mg) by mouth 4 times daily   Refills:  5        tamsulosin 0.4 MG capsule   Commonly known as:  FLOMAX   Dose:  0.8 mg   Quantity:  60 capsule        Take 2 capsules (0.8 mg) by mouth daily   Refills:  11        thiamine 100 MG tablet   Quantity:  90 tablet        TAKE 1 TABLET (100 MG) BY MOUTH DAILY   Refills:  3        TOPROL XL PO        Take 50mg by mouth every morning. Take 25mg by mouth every evening.   Refills:  0        warfarin 1 MG tablet   Commonly known as:  COUMADIN   Dose:  2.5 mg   Quantity:  30 tablet        Take 2.5 tablets (2.5 mg) by mouth daily   Refills:  1        * Notice:  This list has 9 medication(s) that are the same as other medications prescribed for you. Read the directions carefully, and ask your doctor or other care provider to review them with you.            Procedures and tests performed during your visit     Basic metabolic panel    CBC with platelets differential    Head CT w/o contrast    INR    Pulse oximetry    UA with Microscopic reflex to Culture    Urine Culture Aerobic Bacterial    Vascular Access Care Adult IP Consult    XR Chest 1 View      Orders Needing Specimen Collection     None      Pending Results     Date and Time Order Name Status Description    5/7/2018 1745 Urine Culture Aerobic Bacterial Preliminary     5/7/2018 1721 XR Chest 1 View Preliminary             Pending Culture Results     Date and Time Order Name Status Description    5/7/2018 1745 Urine Culture Aerobic Bacterial Preliminary             Pending Results Instructions     If you had any lab results that were not finalized at the time of your Discharge, you can call the ED Lab Result RN at [phone]. You will be contacted by this team for any positive Lab results or changes in treatment. The nurses are available 7 days  "a week from 10A to 6:30P.  You can leave a message 24 hours per day and they will return your call.        Thank you for choosing [facility]       Thank you for choosing [facility] for your care. Our goal is always to provide you with excellent care. Hearing back from our patients is one way we can continue to improve our services. Please take a few minutes to complete the written survey that you may receive in the mail after you visit with us. Thank you!        [employer] Information     [employer] lets you send messages to your doctor, view your test results, renew your prescriptions, schedule appointments and more. To sign up, go to [email or web address] . Click on \"Log in\" on the left side of the screen, which will take you to the Welcome page. Then click on \"Sign up Now\" on the right side of the page.     You will be asked to enter the access code listed below, as well as some personal information. Please follow the directions to create your username and password.     Your access code is: [email or web address]  Expires: 2018  3:27 PM     Your access code will  in 90 days. If you need help or a new code, please call your [facility] clinic or [phone].        Care EveryWhere ID     This is your Care EveryWhere ID. This could be used by other organizations to access your [facility] medical records  [id number]        Equal Access to Services     ALCON SKINNER : Hadalee Rahman, waaxda tamiko, qaybta kaalmaavery rose, willie quiñonez. So [facility] [phone].    ATENCIÓN: Si habla español, tiene a smith disposición servicios gratuitos de asistencia lingüística. Dayron al [phone].    We comply with applicable federal civil rights laws and [place] laws. We do not discriminate on the basis of race, color, national origin, age, disability, sex, sexual orientation, or gender identity.            After Visit Summary       This is your record. Keep this with you and show to your " community pharmacist(s) and doctor(s) at your next visit.

## 2018-05-07 NOTE — ED PROVIDER NOTES
]    History     Chief Complaint   Patient presents with     Nausea & Vomiting     The history is provided by the patient.     Sohan Rendon is a 68 year old male who presents to the Emergency Department today for a headache.  Patient has a significant past medical history and is currently debilitated.  He has a history of coronary artery disease (status post 5 vessel bypass in 2001), also had a mitral valve repair in 2002, ICD implantation 2011, and LVAD placement in 2012.  He had an acute right MCA stroke in 2013 which has left him with left-sided hemiparesis.  Patient's daughter helps take care of him at home. He is bedbound at baseline.  Daughter reports that he seemed to complain of a headache earlier today about 3 PM, and then this resolved.  He seemed confused after that, but then this resolved as well.  He patient did not have any further headaches until he arrived here in the Emergency Department, where he is now complaining of right-sided headache.  He denies any double vision or blurry vision.  No right-sided weakness. Again, he has left-sided hemiparesis at baseline.  No nausea or vomiting.  Daughter does report he had a cough which has been chronic and ongoing since April. He hadbeen treated with antibiotics previously and continues to have a cough productive of clear sputum.  He has not had any diarrhea.  He does report he seems to have some urinary hesitancy.  No abdominal pain at present.    Patient is anticoagulated on Coumadin due to his LVAD.     This part of the medical record was transcribed by Alesia Lobo Scribe, from a dictation done by Mariaelena Martinez MD.    PAST MEDICAL HISTORY  Past Medical History:   Diagnosis Date     Acute right MCA stroke (H) 6/2013    With L sided hemiparesis      Anemia of chronic disease     Baseline Hb 8-9     BPH (benign prostatic hyperplasia)      CHF (congestive heart failure), NYHA class IV (H) 10/9/2012    s/p HeartMate II.  Was on milrinone and  dobutamine prior to LVAD      CKD (chronic kidney disease)     Baseline Cr=     Clostridium difficile colitis 12/2012      Dysphagia     PEG tube placed in 2012.  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  Normal carotid duplex 3/2014.       Esophagitis 12/2012    EGD with esophagitis and multiple douenal ulcers     Fracture of femoral neck, right, closed (H) 2/3/2015    Presumed pathologic as patient is non-weight-bearing and suffered no trauma.  Family declined operative repair during hospital stay 1/23 - 1/30/15.     Gastric and duodenal angiodysplasia with hemorrhage 6/18/2015     GERD (gastroesophageal reflux disease)      Gout      Hematuria      HTN (hypertension)     LDL=59 (3/29/2014)     Hyperlipaemia      Ischemic cardiomyopathy      Mitral regurgitation     s/p MVR with Carbomedics ring      Myocardial infarction 1998    In [facility] without intervention      Nonsenile cataract     BE     PFO (patent foramen ovale)     s/p closure (10/9/2012)     TB lung, latent     s/p 9 months INH in 2012      PAST SURGICAL HISTORY  Past Surgical History:   Procedure Laterality Date     C CABG, VEIN, FIVE  2001     CATARACT IOL, RT/LT Right 11/19/2015     ENDOSCOPIC RETROGRADE CHOLANGIOPANCREATOGRAM N/A 5/9/2017    Procedure: COMBINED ENDOSCOPIC RETROGRADE CHOLANGIOPANCREATOGRAPHY, PLACE TUBE/STENT;  Endoscopic Retrograde Cholangiopancreatogram, Stent (Zimmon Biliary 7fr 12cm) Placement;  Surgeon: Cristo Grove MD;  Location:  OR     ESOPHAGOSCOPY, GASTROSCOPY, DUODENOSCOPY (EGD), COMBINED N/A 6/10/2015    Procedure: COMBINED ESOPHAGOSCOPY, GASTROSCOPY, DUODENOSCOPY (EGD);  Surgeon: Edu Jenkins MD;  Location:  GI     ESOPHAGOSCOPY, GASTROSCOPY, DUODENOSCOPY (EGD), COMBINED N/A 6/15/2015    Procedure: COMBINED ESOPHAGOSCOPY, GASTROSCOPY, DUODENOSCOPY (EGD);  Surgeon: Yury Bonner MD;  Location:  OR      H INSERT ICD  2/10/2011    And pacemaker.  Not BiV     INSERT VENTRICULAR ASSIST DEVICE LEFT (HEARTMATE II)  10/9/2012     IR GASTRO JEJUNOSTOMY TUBE PLACEMENT       PHACOEMULSIFICATION CLEAR CORNEA WITH STANDARD INTRAOCULAR LENS IMPLANT Right 11/19/2015    Procedure: PHACOEMULSIFICATION CLEAR CORNEA WITH STANDARD INTRAOCULAR LENS IMPLANT;  Surgeon: Violeta Cosme MD;  Location: [facility] OR     REPAIR PATENT FORAMEN OVALE  10/9/2012     REPAIR VALVE MITRAL  2/9/2012    28 mm Carbomedics 2/3 ring      FAMILY HISTORY  Family History   Problem Relation Age of Onset     Family History Negative No family hx of      Glaucoma No family hx of      Macular Degeneration No family hx of      CANCER No family hx of      no skin cancer     SOCIAL HISTORY  Social History   Substance Use Topics     Smoking status: Former Smoker     Smokeless tobacco: Former User     Alcohol use No     MEDICATIONS  No current facility-administered medications for this encounter.      Current Outpatient Prescriptions   Medication     acetaminophen (TYLENOL) 325 MG tablet     ALEK CONTOUR test strip     bisacodyl (DULCOLAX) 10 MG Suppository     blood glucose monitoring (ALEK MICROLET) lancets     blood glucose monitoring (NO BRAND SPECIFIED) lancets     finasteride (PROSCAR) 5 MG tablet     furosemide (LASIX) 20 MG tablet     ipratropium - albuterol 0.5 mg/2.5 mg/3 mL (DUONEB) 0.5-2.5 (3) MG/3ML neb solution     ketotifen (ZADITOR/REFRESH ANTI-ITCH) 0.025 % SOLN ophthalmic solution     levETIRAcetam (KEPPRA) 750 MG tablet     loratadine (CLARITIN) 10 MG tablet     losartan (COZAAR) 25 MG tablet     MELATONIN PO     Metoprolol Succinate (TOPROL XL PO)     nitroglycerin (NITROSTAT) 0.4 MG SL tablet     nystatin (MYCOSTATIN) 083791 UNIT/GM POWD     ORDER FOR DME     order for DME     order for DME     order for DME     order for DME     order for DME     order for DME     order for DME     order for DME     oxyCODONE (ROXICODONE) 5 MG IR tablet      PANTOPRAZOLE SODIUM PO     senna-docusate (SENEXON-S) 8.6-50 MG per tablet     simethicone (MYLICON) 80 MG chewable tablet     tamsulosin (FLOMAX) 0.4 MG capsule     Thiamine HCl (VITAMIN B-1) 100 MG TABS     warfarin (COUMADIN) 1 MG tablet     ALLERGIES  Allergies   Allergen Reactions     Seasonal Allergies        I have reviewed the Medications, Allergies, Past Medical and Surgical History, and Social History in the Epic system.    Review of Systems   Constitutional: Negative for chills and fever.   HENT: Negative for congestion, rhinorrhea and sore throat.    Eyes: Negative for redness.   Respiratory: Positive for cough. Negative for shortness of breath.    Cardiovascular: Negative for chest pain.   Gastrointestinal: Negative for abdominal pain, diarrhea, nausea and vomiting.   Genitourinary: Positive for difficulty urinating. Negative for flank pain and hematuria.   Musculoskeletal: Negative for arthralgias, neck pain and neck stiffness.   Skin: Negative for color change.   Neurological: Positive for headaches.   Psychiatric/Behavioral: Positive for confusion.   All other systems reviewed and are negative.      Physical Exam   BP: (!) 113/105  Pulse: 74  Heart Rate: 60  Temp: 97.4  F (36.3  C)  Resp: 18  Weight: 83.5 kg (184 lb)  SpO2: 91 %      Physical Exam   Constitutional: No distress.   Adult [place] male, awake, answering questions that his daughter is asking, interactive, NAD   HENT:   Head: Atraumatic.   Mouth/Throat: Oropharynx is clear and moist. No oropharyngeal exudate.   Eyes: Pupils are equal, round, and reactive to light. No scleral icterus.   Cardiovascular: Normal rate, regular rhythm and intact distal pulses.    LVAD whirr   Pulmonary/Chest: Effort normal. No respiratory distress. He has no wheezes. He has no rales.   Patient seems to be taking shallow breaths at baseline. No wheezing or rhonchi. LVAD whirr obscures clear auscultation of lung sounds   Abdominal: Soft. Bowel sounds are normal.  There is no tenderness.   Soft, obese, nontender   Musculoskeletal: He exhibits no edema or tenderness.   Neurological: He is alert. A cranial nerve deficit is present. No sensory deficit.   Baseline L sided hemiparesis.  Sensation appears normal.     Patient not understanding instruction to smile so difficult to assess facial nerves. Unable to understand instruction to raise eyebrows up.  Other CN intact.     Normal  strength in RUE.  Normal plantarflexion RLE.    Skin: Skin is warm. No rash noted. He is not diaphoretic.   Nursing note and vitals reviewed.      ED Course     ED Course     Procedures             Critical Care time:  none             Results for orders placed or performed during the hospital encounter of 05/07/18 (from the past 24 hour(s))   CBC with platelets differential   Result Value Ref Range    WBC 7.6 4.0 - 11.0 10e9/L    RBC Count 3.43 (L) 4.4 - 5.9 10e12/L    Hemoglobin 9.7 (L) 13.3 - 17.7 g/dL    Hematocrit 31.2 (L) 40.0 - 53.0 %    MCV 91 78 - 100 fl    MCH 28.3 26.5 - 33.0 pg    MCHC 31.1 (L) 31.5 - 36.5 g/dL    RDW 22.6 (H) 10.0 - 15.0 %    Platelet Count 177 150 - 450 10e9/L    Diff Method Automated Method     % Neutrophils 67.8 %    % Lymphocytes 11.1 %    % Monocytes 11.7 %    % Eosinophils 7.9 %    % Basophils 0.4 %    % Immature Granulocytes 1.1 %    Nucleated RBCs 0 0 /100    Absolute Neutrophil 5.1 1.6 - 8.3 10e9/L    Absolute Lymphocytes 0.8 0.8 - 5.3 10e9/L    Absolute Monocytes 0.9 0.0 - 1.3 10e9/L    Absolute Eosinophils 0.6 0.0 - 0.7 10e9/L    Absolute Basophils 0.0 0.0 - 0.2 10e9/L    Abs Immature Granulocytes 0.1 0 - 0.4 10e9/L    Absolute Nucleated RBC 0.0     Platelet Estimate Confirming automated cell count    Basic metabolic panel   Result Value Ref Range    Sodium 139 133 - 144 mmol/L    Potassium 4.0 3.4 - 5.3 mmol/L    Chloride 111 (H) 94 - 109 mmol/L    Carbon Dioxide 22 20 - 32 mmol/L    Anion Gap 6 3 - 14 mmol/L    Glucose 150 (H) 70 - 99 mg/dL    Urea  Nitrogen 20 7 - 30 mg/dL    Creatinine 2.13 (H) 0.66 - 1.25 mg/dL    GFR Estimate 31 (L) >60 mL/min/1.7m2    GFR Estimate If Black 38 (L) >60 mL/min/1.7m2    Calcium 8.4 (L) 8.5 - 10.1 mg/dL   INR   Result Value Ref Range    INR 1.79 (H) 0.86 - 1.14   UA with Microscopic reflex to Culture   Result Value Ref Range    Color Urine Light Yellow     Appearance Urine Clear     Glucose Urine Negative NEG^Negative mg/dL    Bilirubin Urine Negative NEG^Negative    Ketones Urine Negative NEG^Negative mg/dL    Specific Gravity Urine 1.004 1.003 - 1.035    Blood Urine Trace (A) NEG^Negative    pH Urine 5.5 5.0 - 7.0 pH    Protein Albumin Urine Negative NEG^Negative mg/dL    Urobilinogen mg/dL Normal 0.0 - 2.0 mg/dL    Nitrite Urine Negative NEG^Negative    Leukocyte Esterase Urine Moderate (A) NEG^Negative    Source Clean catch urine     WBC Urine 3 0 - 5 /HPF    RBC Urine 1 0 - 2 /HPF    Bacteria Urine Moderate (A) NEG^Negative /HPF    Squamous Epithelial /HPF Urine <1 0 - 1 /HPF   Urine Culture Aerobic Bacterial   Result Value Ref Range    Specimen Description Midstream Urine     Special Requests Specimen received in preservative     Culture Micro PENDING    XR Chest 1 View    Narrative    Exam: XR CHEST 1 VW, 5/7/2018 7:12 PM    Indication: cough;     Comparison: 4/16/2018    Findings:   Single portable view of the chest. Pacemaker with associated lead in  place. Multiple surgical clips and sternotomy wires projecting over  the mediastinum. Stable enlarged cardiac silhouette. LVAD partially  visualized projecting over the left upper quadrant.    No pleural effusion. No pneumothorax. No focal airspace opacities.      Impression    Impression: No acute cardiopulmonary findings. Support devices in  stable position.    I have personally reviewed the examination and initial interpretation  and I agree with the findings.    KINSEY HATCH MD   Head CT w/o contrast    Narrative    CT HEAD W/O CONTRAST 5/7/2018 7:24 PM    Provided  History: headache, LVAD patient on coumadin, hx of R MCA  stroke 2013, eval for bleed;     Comparison: Head CT 5/23/2016.    Technique: Using multidetector thin collimation helical acquisition  technique, axial, coronal and sagittal CT images from the skull base  to the vertex were obtained without intravenous contrast.     Findings:    No intracranial hemorrhage, mass effect, or midline shift. Unchanged  extensive encephalomalacia in the right MCA territory and in the right  cerebellar hemisphere with associated ex vacuo dilatation of the right  lateral ventricle and right-sided aspect of the fourth ventricle.  Moderate generalized cerebral and cerebellar volume loss is also  unchanged. Unchanged patchy subcortical and periventricular white  matter hypoattenuation. The ventricles are proportionate to the  cerebral sulci. The gray to white matter differentiation of the  cerebral hemispheres is preserved. The basal cisterns are patent. No  change in old lacunar infarct, small, left caudate body. Wallerian  degeneration right-side of the brainstem.  The visualized paranasal sinuses are clear. The mastoid air cells are  clear.       Impression    Impression:   1. No acute intracranial pathology.  2. Stable encephalomalacia of the right frontal, temporal, and  parietal lobes as well as of the right cerebellar hemisphere.  3. Moderate volume loss and leukoaraiosis, unchanged.  4. No change infarct left caudate body.    I have personally reviewed the examination and initial interpretation  and I agree with the findings.    HIWOT GOODMAN MD     *Note: Due to a large number of results and/or encounters for the requested time period, some results have not been displayed. A complete set of results can be found in Results Review.              Assessments & Plan (with Medical Decision Making)   Patient presents for the above complaints. In the ED, he is awake, alert, and communicating with his daughter.  He is responsive  to verbal stimuli. He is having difficulty with cooperating or understanding the instructions to smile, so it is difficult to assess cranial nerves at that point.  He also has weakness of shoulder shrug on the left which is baseline.  He has no movement of left upper or left lower extremity at baseline.  His vital signs show temperature 97.4   F, pulse ox is 91%.    Any number of etiologies could explain his symptoms. We did establish IV access and mindy blood for laboratory analysis.  There is certainly is concern for infection. Of note, the patient's daughter denies any driveline discharge or any alarms on his LVAD.  Also need to consider metabolic abnormality.  Bleed is always a possibility in someone on anticoagulation.  CBC today is remarkable for hemoglobin of 9.7 which is stable and baseline, BMP shows creatinine of 2.13 which is consistent with his known renal insufficiency, and INR is a bit subtherapeutic at 1.73.  He should take an extra dose of coumadin today and follow up with his clinic for repeat INR and further dosage adjustments if needed. We did do a UA as well as he has a prior history of UTIs, and this is not remarkable for any sign of infection, he does have some chronic hematuria which is been an ongoing problem.  Chest x-ray shows no sign of acute infection. Head CT shows stable encephalomalacia the right frontal, temporal and parietal lobes as well as right cerebellar hemisphere consistent with prior stroke, nothing new or acute today.    Upon reassessment the patient is sleeping comfortably and headache has resolved.  Family reports he seems to be acting totally normally.  Instructed them to follow-up with his clinic as directed.    This part of the medical record was transcribed by Alesia Lobo Scribe, from a dictation done by Mariaelena Martinez MD.    I have reviewed the nursing notes.    I have reviewed the findings, diagnosis, plan and need for follow up with the patient.    New  Prescriptions    No medications on file       Final diagnoses:   Acute nonintractable headache, unspecified headache type   LVAD (left ventricular assist device) present (H)       5/7/2018   [facility], [place], EMERGENCY DEPARTMENT     Mariaelena Martinez MD  05/08/18 0137

## 2018-05-07 NOTE — ED AVS SNAPSHOT
[facility], [place], Emergency Department    [address] [address]    Phone:  [phone]                                       Sohan Rendon   MRN: [id number]    Department:  [facility], Emergency Department   Date of Visit:  5/7/2018           After Visit Summary Signature Page     I have received my discharge instructions, and my questions have been answered. I have discussed any challenges I see with this plan with the nurse or doctor.    ..........................................................................................................................................  Patient/Patient Representative Signature      ..........................................................................................................................................  Patient Representative Print Name and Relationship to Patient    ..................................................               ................................................  Date                                            Time    ..........................................................................................................................................  Reviewed by Signature/Title    ...................................................              ..............................................  Date                                                            Time

## 2018-05-08 NOTE — DISCHARGE INSTRUCTIONS
You have been seen in the emergency department for a headache.  You have had blood tests which are very normal for you.  Your CT scan is normal as well.  We recommend that you use Tylenol as needed to help with headaches at home.  Follow-up with your primary clinic as needed.

## 2018-05-08 NOTE — PROGRESS NOTES
[facility] Care Coordination Contact  CM spoke to client's daughter after client seen in ED yesterday. She states client is feeling better today. No complaints of pain at this time. No questions or concerns.   Nena Salazar RN  [facility]   [phone]

## 2018-05-08 NOTE — TELEPHONE ENCOUNTER
[facility]M referral from: Transitions of Care (recent hospital discharge or ED visit)    MTM referral outreach attempt #2 on May 8, 2018 at 1:18 PM      Outcome: Patient not reachable after several attempts, will route to MTM Pharmacist/Provider as an FYI. Thank you for the referral.    Nya Gates, MTM Coordinator

## 2018-05-09 NOTE — TELEPHONE ENCOUNTER
[facility] [Adult-Male]    [facility] ED lab result protocol used  Urine Culture    Reason for call  Notify of lab results, assess symptoms,  review ED providers recommendations/discharge instructions (if necessary) and advise per ED lab result f/u protocol    Lab Result (including Rx patient on, if applicable)  Preliminary urine culture report on 5/9/18 shows the presence of bacteria(s): 50,000 to 100,000 colonies/mL Gram positive cocci  Emergency Dept discharge antibiotic: None.  Recommendations per [facility] ED Lab result protocol - Urine culture protocol.    Information table from ED Provider visit on 5/7/18  ED diagnosis: Acute nonintractable headache     ED provider Dr. Mariaelena Martinez MD   Symptoms reported at ED visit (Chief complaint, HPI) Sohan Rendon is a 68 year old male who presents to the Emergency Department today for a headache.  Patient has a significant past medical history and is currently debilitated.  He has a history of coronary artery disease (status post 5 vessel bypass in 2001), also had a mitral valve repair in 2002, ICD implantation 2011, and LVAD placement in 2012.  He had an acute right MCA stroke in 2013 which has left him with left-sided hemiparesis.  Patient's daughter helps take care of him at home. He is bedbound at baseline.  Daughter reports that he seemed to complain of a headache earlier today about 3 PM, and then this resolved.  He seemed confused after that, but then this resolved as well.  He patient did not have any further headaches until he arrived here in the Emergency Department, where he is now complaining of right-sided headache.  He denies any double vision or blurry vision.  No right-sided weakness. Again, he has left-sided hemiparesis at baseline.  No nausea or vomiting.  Daughter does report he had a cough which has been chronic and ongoing since April. He hadbeen treated with antibiotics previously and continues  to have a cough productive of clear sputum.  He has not had any diarrhea.  He does report he seems to have some urinary hesitancy.  No abdominal pain at present   ED providers Impression and Plan (applicable information) Patient presents for the above complaints. In the ED, he is awake, alert, and communicating with his daughter.  He is responsive to verbal stimuli. He is having difficulty with cooperating or understanding the instructions to smile, so it is difficult to assess cranial nerves at that point.  He also has weakness of shoulder shrug on the left which is baseline.  He has no movement of left upper or left lower extremity at baseline.  His vital signs show temperature 97.4   F, pulse ox is 91%.     Any number of etiologies could explain his symptoms. We did establish IV access and mindy blood for laboratory analysis.  There is certainly is concern for infection. Of note, the patient's daughter denies any driveline discharge or any alarms on his LVAD.  Also need to consider metabolic abnormality.  Bleed is always a possibility in someone on anticoagulation.  CBC today is remarkable for hemoglobin of 9.7 which is stable and baseline, BMP shows creatinine of 2.13 which is consistent with his known renal insufficiency, and INR is a bit subtherapeutic at 1.73.  He should take an extra dose of coumadin today and follow up with his clinic for repeat INR and further dosage adjustments if needed. We did do a UA as well as he has a prior history of UTIs, and this is not remarkable for any sign of infection, he does have some chronic hematuria which is been an ongoing problem.  Chest x-ray shows no sign of acute infection. Head CT shows stable encephalomalacia the right frontal, temporal and parietal lobes as well as right cerebellar hemisphere consistent with prior stroke, nothing new or acute today.     Upon reassessment the patient is sleeping comfortably and headache has resolved.  Family reports he seems to be  "acting totally normally.  Instructed them to follow-up with his clinic as directed.   Significant Medical hx, if applicable abdominal pain, CKD, C diff, dysphagia, duodenitis, CHF, BPH, LVAD, HTN, hematuria, MI,pancreatitis   Coumadin/Warfarin [Yes or No] Yes   Creatinine Level (mg/dl) 2.13   Creatinine clearance (ml/min), if applicable 39.56   Allergies Seasonal allergies   Weight, if applicable 184 #      RN Assessment (Patient s current Symptoms), include time called.  [Insert Left message here if message left]  Dtr reports Sohan's urine \"looks cloudy\", yesterday Sohan had \"mentioned\" some discomfort with urination.  Dtr Antionette (primary caregiver) prefers to wait for final UC.  She will call this nurse back if symptoms are worsening today.  Will at this time await final UC.       Please Contact your PCP clinic or return to the Emergency department if your:    Symptoms worsen or other concerning symptom's.    PCP follow-up Questions asked: YES       Katlyn Haley RN    [place] [facility] RN  Lung Nodule and ED Lab Results F/U RN  Epic pool (ED late result f/u RN) : P [id number]   # [phone]     Copy of Lab result   Order   Urine Culture Aerobic Bacterial [[id number]] (Order [id number])   Preliminary Result   Exam Information   Exam Date Exam Time Accession # Results    5/7/18  5:45 PM [id number]    Component Results   Component Collected Lab   Specimen Description 05/07/2018  5:45    Midstream Urine   Special Requests 05/07/2018  5:45 PM 75   Specimen received in preservative   Culture Micro (Abnormal) 05/07/2018  5:45    50,000 to 100,000 colonies/mL   Gram positive cocci   Susceptibility testing in progress          "

## 2018-05-10 PROBLEM — R41.82 ALTERED MENTAL STATUS: Status: ACTIVE | Noted: 2018-01-01

## 2018-05-10 NOTE — TELEPHONE ENCOUNTER
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  He was seen on the 7th and had a UC done.  Gram + cocci cultured out and sensitivities are still pending.  No more reported hematuria.  Possibilities for the headache could be Temporal arteritis, hallucinations could be due to a UTI.    During the time the home care nurse and I were talking, the daughter called the LVAD nurse who recommended he go to the ER for evaluation.  The daughter wants to bring him to the ER.  VS within his normal range and he is afebrile.    ASSESSMENT/PLAN  Hallucinations and right temporal headache    Sent to [facility] for evaluation    CLAIRE Poe CNP

## 2018-05-10 NOTE — ED NOTES
[facility] [place]   ED Nurse to Floor Handoff     Sohan Rendon is a 68 year old male who speaks [place] and lives with family members,  in a home  They arrived in the ED by ambulance from home    ED Chief Complaint: Altered Mental Status    ED Dx;   Final diagnoses:   Sepsis due to urinary tract infection (H)         Needed?: Yes    Allergies:   Allergies   Allergen Reactions     Seasonal Allergies    .  Past Medical Hx:   Past Medical History:   Diagnosis Date     Acute right MCA stroke (H) 6/2013    With L sided hemiparesis      Anemia of chronic disease     Baseline Hb 8-9     BPH (benign prostatic hyperplasia)      CHF (congestive heart failure), NYHA class IV (H) 10/9/2012    s/p HeartMate II.  Was on milrinone and dobutamine prior to LVAD      CKD (chronic kidney disease)     Baseline Cr=     Clostridium difficile colitis 12/2012      Dysphagia     PEG tube placed in 2012.  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  Normal carotid duplex 3/2014.       Esophagitis 12/2012    EGD with esophagitis and multiple douenal ulcers     Fracture of femoral neck, right, closed (H) 2/3/2015    Presumed pathologic as patient is non-weight-bearing and suffered no trauma.  Family declined operative repair during hospital stay 1/23 - 1/30/15.     Gastric and duodenal angiodysplasia with hemorrhage 6/18/2015     GERD (gastroesophageal reflux disease)      Gout      Hematuria      HTN (hypertension)     LDL=59 (3/29/2014)     Hyperlipaemia      Ischemic cardiomyopathy      Mitral regurgitation     s/p MVR with Carbomedics ring      Myocardial infarction 1998    In [facility] without intervention      Nonsenile cataract     BE     PFO (patent foramen ovale)     s/p closure (10/9/2012)     TB lung, latent     s/p 9 months INH in 2012       Baseline Mental status: mild dementia  Current Mental Status  changes: at basesline    Infection present or suspected this encounter: no  Sepsis suspected: No  Isolation type: Contact     Activity level - Baseline/Home:  Total Care  Activity Level - Current:   Total Care    Bariatric equipment needed?: No    In the ED these meds were given:   Medications   linezolid (ZYVOX) infusion 600 mg (0 mg Intravenous Stopped 5/10/18 1345)   sodium chloride (PF) 0.9% PF flush 10 mL (not administered)   perflutren diluted 1mL to 2mL with saline (OPTISON) diluted injection 6 mL (6 mLs Intravenous Given 5/10/18 8712)       Drips running?  Yes    Home pump  No    Current LDAs  Peripheral IV 05/10/18 Right;Posterior Upper forearm (Active)   Number of days:0       Urethral Catheter (Active)   Number of days:361       Wound 06/17/16 Mid Perineum Fistula small opening  (Active)   Number of days:692       Labs results:   Labs Ordered and Resulted from Time of ED Arrival Up to the Time of Departure from the ED   CBC WITH PLATELETS DIFFERENTIAL - Abnormal; Notable for the following:        Result Value    RBC Count 3.50 (*)     Hemoglobin 9.8 (*)     Hematocrit 31.8 (*)     MCHC 30.8 (*)     RDW 22.2 (*)     All other components within normal limits   COMPREHENSIVE METABOLIC PANEL - Abnormal; Notable for the following:     Carbon Dioxide 19 (*)     Glucose 175 (*)     Creatinine 2.01 (*)     GFR Estimate 33 (*)     GFR Estimate If Black 40 (*)     Calcium 8.0 (*)     Albumin 3.1 (*)     All other components within normal limits   LACTIC ACID WHOLE BLOOD - Abnormal; Notable for the following:     Lactic Acid 2.4 (*)     All other components within normal limits   ROUTINE UA WITH MICROSCOPIC REFLEX TO CULTURE - Abnormal; Notable for the following:     Blood Urine Small (*)     Leukocyte Esterase Urine Moderate (*)     WBC Urine 7 (*)     Bacteria Urine Moderate (*)     Renal Tub Epi <1 (*)     All other components within normal limits   INR - Abnormal; Notable for the following:     INR 2.11 (*)      All other components within normal limits   PARTIAL THROMBOPLASTIN TIME - Abnormal; Notable for the following:     PTT 48 (*)     All other components within normal limits   LACTATE DEHYDROGENASE - Abnormal; Notable for the following:     Lactate Dehydrogenase 1453 (*)     All other components within normal limits   TROPONIN I   URINE CULTURE AEROBIC BACTERIAL   BLOOD CULTURE   BLOOD CULTURE       Imaging Studies:   Recent Results (from the past 24 hour(s))   CT Head w/o Contrast   Result Value    Radiologist flags New late or subacute infarct in the left occipital (Urgent)    Narrative    CT HEAD W/O CONTRAST 5/10/2018 11:17 AM    History: AMS;     Comparison: CT head 5/7/2018.    Technique: Using multidetector thin collimation helical acquisition  technique, axial, coronal and sagittal CT images from the skull base  to the vertex were obtained without intravenous contrast.     Findings:    There is a new area of hypoattenuation in the left occipital lobe, in  the distribution of the PCA. There is no associated atrophy in this  region. There is no evidence of hemorrhage, mass effect or midline  shift. Other stable areas of encephalomalacia compared to the CT on  5/7/2018, representing numerous old infarctions in multiple vascular  territories, the largest of which is in the left MCA distribution.  There is no change in the size or configuration of the ventricular  system compared to 5/7/2018. Wallerian degeneration of the right side  of the brainstem. The bony calvaria and bones of the skull base appear  normal. The visualized paranasal sinuses and mastoid air cells are  clear.       Impression    Impression:  1. There is a new late or subacute infarction in the left occipital  lobe, in the distribution of the left PCA, without any associated  atrophy. There is no evidence of hemorrhage or mass effect. MRI could  be obtained to further evaluate the age of this new infarction.  2. Numerous old infarcts within  multiple vascular territories, the  largest of which is in the distribution of the right MCA, as  previously seen on the CT on 5/7/2018.    [Urgent Result: New late or subacute infarct in the left occipital  lobe]    Finding was identified on 5/10/2018 11:34 AM.     Dr. Reyes was contacted by Dr. Gann at 5/10/2018 11:36 AM and  verbalized understanding of the urgent finding.     XR Chest Port 1 View    Narrative    XR CHEST PORT 1 VW  5/10/2018 12:11 PM      HISTORY: ams;     COMPARISON: Chest x-ray 5/7/2018    TECHNIQUE: Single semiupright radiograph of the chest    FINDINGS: Postoperative changes of left ventricular assist device  placement and single lead cardiac pacer defibrillator with lead in the  right ventricle. Stable left retrocardiac patchy opacity. Remainder  the lungs are clear. No pneumothorax or pleural effusion. Cardiac  mediastinal silhouette is unchanged in size. The trachea is midline.  Incompletely visualized right-sided ureteral stent.      Impression    IMPRESSION: Postoperative changes of left ventricular assist device  placement with left retrocardiac airspace opacity, infection versus  atelectasis.    These findings were discussed with senior radiology resident Jessica Tapia MD.     I have personally reviewed the examination and initial interpretation  and I agree with the findings.    KINSEY HATCH MD       Recent vital signs:   /90  Temp 97.2  F (36.2  C) (Oral)  Resp 9  Wt 89.2 kg (196 lb 10.4 oz)  SpO2 100%  BMI 29.9 kg/m2    Cardiac Rhythm: Other  Pt needs tele? Yes  Skin/wound Issues: None    Code Status: Full Code    Pain control: good    Nausea control: good    Abnormal labs/tests/findings requiring intervention: q 2 nueros     Family present during ED course? Yes   Family Comments/Social Situation comments: family provides personal cares     Tasks needing completion: None    Porter Sanderson, RN  asc-- [phone] [facility]  [phone] [facility] ED

## 2018-05-10 NOTE — ED PROVIDER NOTES
History     Chief Complaint   Patient presents with     Altered Mental Status     HPI     History gathered through patient's daughter by request    Sohan Rendon is a 68 year old male with complex PMH of acute right MCA stroke (with left-sided deficit), LVAD, ICD, CAD (on warfarin, s/p 5 vessel bypass) , HTN, CHF, HLD, and CKD among others presents to the ED with altered mental status.  2 days ago patient was feeling somewhat weak, but yesterday was at his baseline.  Was normal when he went to bed.  In the morning upon awakening patient was somewhat confused to where he was, who was around 10.  Was seeing people that were not there.  He was redirectable continue to be confused.  No history of recent trauma, no fevers chills chest pain or shortness of breath.    Was seen for headache 3 days ago with largely unrevealing workup, however urine culture grew vancomycin-resistant enterococcus.    I have reviewed the Medications, Allergies, Past Medical and Surgical History, and Social History in the Epic system.    Review of Systems   14 point review of symptoms was performed and is negative except as noted above.     Physical Exam   BP: (!) 153/140  Heart Rate: 68  Temp: 97.2  F (36.2  C)  Resp: 16  Weight: 89.2 kg (196 lb 10.4 oz)  SpO2: 95 %      Physical Exam     GEN: Well appearing, non toxic, cooperative and conversant.   HEENT: The head is normocephalic and atraumatic. Pupils are equal round and reactive to light. Extraocular motions are intact. There is no facial swelling. The neck is nontender and supple.   CV: LVAD hum, driveline site clean.  LVAD settings without evidence of acute thrombosis or pump failure.   PULM: Clear to auscultation bilaterally, somewhat obscured by LVAD  ABD: Soft, nontender, minimally distended.   EXT: Full range of motion.  No edema.  NEURO: Left-sided hemiparesis.  Possible left upper visual field cut, uncertain if new.  Oriented to place and person on reevaluation at 3:10  PM    PSYCH: Calm and cooperative, interactive.     ED Course     ED Course     Procedures        Recent Results (from the past 24 hour(s))   CT Head w/o Contrast   Result Value    Radiologist flags New late or subacute infarct in the left occipital (Urgent)    Narrative    CT HEAD W/O CONTRAST 5/10/2018 11:17 AM    History: AMS;     Comparison: CT head 5/7/2018.    Technique: Using multidetector thin collimation helical acquisition  technique, axial, coronal and sagittal CT images from the skull base  to the vertex were obtained without intravenous contrast.     Findings:    There is a new area of hypoattenuation in the left occipital lobe, in  the distribution of the PCA. There is no associated atrophy in this  region. There is no evidence of hemorrhage, mass effect or midline  shift. Other stable areas of encephalomalacia compared to the CT on  5/7/2018, representing numerous old infarctions in multiple vascular  territories, the largest of which is in the left MCA distribution.  There is no change in the size or configuration of the ventricular  system compared to 5/7/2018. Wallerian degeneration of the right side  of the brainstem. The bony calvaria and bones of the skull base appear  normal. The visualized paranasal sinuses and mastoid air cells are  clear.       Impression    Impression:  1. There is a new late or subacute infarction in the left occipital  lobe, in the distribution of the left PCA, without any associated  atrophy. There is no evidence of hemorrhage or mass effect. MRI could  be obtained to further evaluate the age of this new infarction.  2. Numerous old infarcts within multiple vascular territories, the  largest of which is in the distribution of the right MCA, as  previously seen on the CT on 5/7/2018.    [Urgent Result: New late or subacute infarct in the left occipital  lobe]    Finding was identified on 5/10/2018 11:34 AM.     Dr. Reyes was contacted by Dr. Gann at 5/10/2018 11:36 AM  and  verbalized understanding of the urgent finding.     XR Chest Port 1 View    Impression    IMPRESSION: Postoperative changes of left ventricular assist device  placement with left retrocardiac airspace opacity, infection versus  atelectasis.    These findings were discussed with senior radiology resident Jessica Tapia MD.             Labs Ordered and Resulted from Time of ED Arrival Up to the Time of Departure from the ED   CBC WITH PLATELETS DIFFERENTIAL - Abnormal; Notable for the following:        Result Value    RBC Count 3.50 (*)     Hemoglobin 9.8 (*)     Hematocrit 31.8 (*)     MCHC 30.8 (*)     RDW 22.2 (*)     All other components within normal limits   COMPREHENSIVE METABOLIC PANEL - Abnormal; Notable for the following:     Carbon Dioxide 19 (*)     Glucose 175 (*)     Creatinine 2.01 (*)     GFR Estimate 33 (*)     GFR Estimate If Black 40 (*)     Calcium 8.0 (*)     Albumin 3.1 (*)     All other components within normal limits   LACTIC ACID WHOLE BLOOD - Abnormal; Notable for the following:     Lactic Acid 2.4 (*)     All other components within normal limits   ROUTINE UA WITH MICROSCOPIC REFLEX TO CULTURE - Abnormal; Notable for the following:     Blood Urine Small (*)     Leukocyte Esterase Urine Moderate (*)     WBC Urine 7 (*)     Bacteria Urine Moderate (*)     Renal Tub Epi <1 (*)     All other components within normal limits   INR - Abnormal; Notable for the following:     INR 2.11 (*)     All other components within normal limits   PARTIAL THROMBOPLASTIN TIME - Abnormal; Notable for the following:     PTT 48 (*)     All other components within normal limits   TROPONIN I   URINE CULTURE AEROBIC BACTERIAL   BLOOD CULTURE   BLOOD CULTURE       Consults  Cardiology: Responded (Cards 2 staff) (05/10/18 4931)    Assessments & Plan (with Medical Decision Making)   68-year-old male with complex past medical history including LVAD, left-sided hemiparesis following right MCA stroke, chronic kidney  disease presenting with altered mental status  Differential diagnosis is broad including infectious, secondary intracranial pathology such as bleed, mass, toxic metabolic, uremia, CVA, among other causes  Labs are overall unrevealing including stable GFR, negative troponin, no leukocytosis, stable hemoglobin.  Urinalysis/urine culture revealing VRE from prior visit, could explain altered mental status, somewhat suspicious as urinalysis today is quite clean.  Given his altered mental status we will treat him empirically.    Chest x-ray without evidence of acute pneumonia  CT head ordered for evaluation of acute intracranial hemorrhage given altered mental status.  No bleed identified, however there is a acute CVA.  Stroke service consulted, no acute immediate interventions.  Patient admitted to the heart failure service for further management.    On reevaluation at 3:10 PM, the daughter reports patient's mental status is at his baseline, that he feels somewhat weak.    I have reviewed the nursing notes.    I have reviewed the findings, diagnosis, plan and need for follow up with the patient.    New Prescriptions    No medications on file       Final diagnoses:   Sepsis due to urinary tract infection (H)       5/10/2018   [facility], [place], EMERGENCY DEPARTMENT     Ochoa Edwards MD  05/10/18 0431

## 2018-05-10 NOTE — IP AVS SNAPSHOT
[address] [address]    Phone:  [phone]                                       After Visit Summary   5/10/2018    Sohan Rendon    MRN: [id number]           After Visit Summary Signature Page     I have received my discharge instructions, and my questions have been answered. I have discussed any challenges I see with this plan with the nurse or doctor.    ..........................................................................................................................................  Patient/Patient Representative Signature      ..........................................................................................................................................  Patient Representative Print Name and Relationship to Patient    ..................................................               ................................................  Date                                            Time    ..........................................................................................................................................  Reviewed by Signature/Title    ...................................................              ..............................................  Date                                                            Time

## 2018-05-10 NOTE — PROGRESS NOTES
ANTICOAGULATION FOLLOW-UP CLINIC VISIT    Patient Name:  Sohan Rendon  Date:  5/10/2018  Contact Type:  Telephone    SUBJECTIVE:     Patient Findings     Positives No Problem Findings           OBJECTIVE    INR   Date Value Ref Range Status   05/10/2018 1.9  Final     Chromogenic Factor 10   Date Value Ref Range Status   08/10/2014 24 (L) 70 - 130 % Final     Comment:     Therapeutic Range:  A Chromogenic Factor 10 level of approximately 20-40%   inversely correlates with an INR of 2-3 for patients receiving Warfarin.   Chromogenic Factor 10 levels below 20% indicate an INR greater than 3 and   levels above 40% indicate an INR less than 2.       Factor 2 Assay   Date Value Ref Range Status   07/21/2014 25 (L) 60 - 140 % Final     Comment:     Analyte Specific Reagents (ASRs) are used in many laboratory tests necessary   for   standard medical care and generally do not require FDA approval.  This test   was   developed and its performance characteristics determined by [facility].  It has not been cleared or approved by   the [place] Food and Drug Administration.         ASSESSMENT / PLAN  No question data found.  Anticoagulation Summary as of 5/10/2018     INR goal    Prior goal 1.8-2.5   Today's INR 1.9   Maintenance plan No maintenance plan   Full instructions 5/10: 3 mg; 5/11: 3 mg; 5/12: 3 mg; 5/13: 4.5 mg   Weekly total 25.5 mg   Plan last modified Blanca Bah RN (4/5/2018)   Next INR check 5/14/2018   Priority INR   Target end date Indefinite    Indications   LVAD (left ventricular assist device) present [Z95.811]  Long-term (current) use of anticoagulants [Z79.01] [Z79.01]         Anticoagulation Episode Summary     INR check location     Preferred lab     Send INR reminders to [facility]    Comments Goal Range is 1.8-2.3  [facility] comes out to draw INR  Yancy @ [phone]  Daughter Almaz Ph [phone]      Anticoagulation Care  Providers     Provider Role Specialty Phone number    Evon Lemons MD Responsible Cardiology [phone]            See the Encounter Report to view Anticoagulation Flowsheet and Dosing Calendar (Go to Encounters tab in chart review, and find the Anticoagulation Therapy Visit)    Spoke with Lyly MCKINNON.    Blanca Bah, RN

## 2018-05-10 NOTE — IP AVS SNAPSHOT
MRN:[id number]                      After Visit Summary   5/10/2018    Sohan Rendon    MRN: [id number]           Thank you!     Thank you for choosing [facility] for your care. Our goal is always to provide you with excellent care. Hearing back from our patients is one way we can continue to improve our services. Please take a few minutes to complete the written survey that you may receive in the mail after you visit with us. Thank you!        Patient Information     Date Of Birth          1950        Designated Caregiver       Most Recent Value    Caregiver    Will someone help with your care after discharge? yes    Name of designated caregiver daughter    Phone number of caregiver same as pt    Caregiver address same as pt      About your hospital stay     You were admitted on:  May 10, 2018 You last received care in the:  [address] [facility]    You were discharged on:  May 12, 2018        Reason for your hospital stay       You were admitted for confusion that is due to your urinary tract infection and new stroke. You improved with antibiotics, please complete full course of antibiotics, total 7 days. You were also restarted on your aspirin, please take this daily. You told us that were having difficulty swallowing the keppra pill so we changed the medication to liquid which you are now tolerating. Your warfarin dose was also changed to 3mg daily, please have INR checked on Tuesday (5/15). Please see the discharge medication list for updated list of medications.                  Who to Call     For medical emergencies, please call 911.  For non-urgent questions about your medical care, please call your primary care provider or clinic, [phone]          Attending Provider     Provider Specialty    Jose Roberto Underwood MD Emergency Medicine    Grace, Ochoa Murphy MD Emergency Medicine    Desire, Spencer Marte MD Cardiology    Carrie Butler MD Cardiology       Primary Care  Provider Office Phone # Fax #    CLAIRE Rosas [phone]      After Care Instructions     Activity       Your activity upon discharge: activity as tolerated            Diet       Follow this diet upon discharge: Orders Placed This Encounter      Fluid restriction 2000 ML FLUID      2 Gram Sodium Diet            Discharge Instructions       Please have your INR checked 5/15.   Please follow up with primary care doctor in next 1-2wks with CBC, BMP, Mag, INR.  Please follow up with heart failure clinic in 2-4wks                  Follow-up Appointments     Follow Up and recommended labs and tests       INR check on 5/15.  PCP in 1-2wks with CBC, BMP, Mag, INR  Cardiology heart failure in 2-4wks                  Additional Services     Home care nursing referral       [facility]   Phone: [phone]     For resumption of palliative home care program.   RN evaluation post hospitalization. Assess vital signs, respiratory and cardiac status, activity tolerance, hydration, nutritional status, med setup and management.   INR lab draws with results to [facility].       Your provider has ordered home care nursing services. If you have not been contacted within 2 days of your discharge please call the inpatient department phone number at [phone] .            Medication Therapy Management Referral       MTM referral reason            Patient had a hospital or ED visit in last 6 months and has more than 10   PTA or Discharge medications    Patient has 5 PTA or Discharge Medications AND one of the following   diagnoses: DM,HF,COPD,AMI DX,PULM HTN       This service is designed to help you get the most from your medications.  A specially trained pharmacist will work closely with you and your doctors  to solve any problems related to your medications and to help you get the   best results from taking them.      The Medication Therapy Management staff will call you to schedule an  "appointment.                  Warfarin Instruction     You have started taking a medicine called warfarin. This is a blood-thinning medicine (anticoagulant). It helps prevent and treat blood clots.      Before leaving the hospital, make sure you know how much to take and how long to take it.      You will need regular blood tests to make sure your blood is clotting safely. It is very important to see your doctor for regular blood tests.    Talk to your doctor before taking any new medicine (this includes over-the-counter drugs and herbal products). Many medicines can interact with warfarin. This may cause more bleeding or too much clotting.     Eating a lot of vitamin K--found in green, leafy vegetables--can change the way warfarin works in your body. Do NOT avoid these foods. Instead, try to eat the same amount each day.     Bleeding is the most common side-effect of warfarin. You may notice bleeding gums, a bloody nose, bruises and bleeding longer when you cut yourself. See a doctor at once if:   o You cough up blood  o You find blood in your stool (poop)  o You have a deep cut, or a cut that bleeds longer than 10 minutes   o You have a bad cut, hard fall, accident or hit your head (go to urgent care or the emergency room).    For women who can get pregnant: This medicine can harm an unborn baby. Be very careful not to get pregnant while taking this medicine. If you think you might be pregnant, call your doctor right away.    For more information, read \"Guide to Warfarin Therapy,  the booklet you received in the hospital.        Pending Results     Date and Time Order Name Status Description    5/10/2018 1104 Blood culture Preliminary     5/10/2018 1104 Blood culture Preliminary     5/10/2018 1038 Urine Culture Aerobic Bacterial Preliminary             Statement of Approval     Ordered          05/12/18 1317  I have reviewed and agree with all the recommendations and orders detailed in this document.  EFFECTIVE " "NOW     Approved and electronically signed by:  Volodymyr Champion MD             Admission Information     Date & Time Provider Department Dept. Phone    5/10/2018 Carrie Butler MD [address] [facility] [phone]      Your Vitals Were     Blood Pressure Temperature Respirations Weight Pulse Oximetry BMI (Body Mass Index)    91/74 (BP Location: Right arm) 97.8  F (36.6  C) (Oral) 16 89.2 kg (196 lb 10.4 oz) 100% 29.9 kg/m2      [employer] Information     [employer] lets you send messages to your doctor, view your test results, renew your prescriptions, schedule appointments and more. To sign up, go to [email or web address] . Click on \"Log in\" on the left side of the screen, which will take you to the Welcome page. Then click on \"Sign up Now\" on the right side of the page.     You will be asked to enter the access code listed below, as well as some personal information. Please follow the directions to create your username and password.     Your access code is: [email or web address]  Expires: 2018  3:27 PM     Your access code will  in 90 days. If you need help or a new code, please call your [facility] clinic or [phone].        Care EveryWhere ID     This is your Care EveryWhere ID. This could be used by other organizations to access your [facility] medical records  [id number]        Equal Access to Services     [facility] Medical CenterBRENDA AH: Hadii emily Rahman, waaxda luqadaha, qaybta kaalmada rose, willie quiñonez. So [facility] [phone].    ATENCIÓN: Si habla español, tiene a smith disposición servicios gratuitos de asistencia lingüística. Llame al [phone].    We comply with applicable federal civil rights laws and [place] laws. We do not discriminate on the basis of race, color, national origin, age, disability, sex, sexual orientation, or gender identity.               Review of your medicines      START taking        Dose / Directions    aspirin 81 MG tablet "   Used for:  Ischemic cardiomyopathy        Dose:  81 mg   Take 1 tablet (81 mg) by mouth daily   Quantity:  30 tablet   Refills:  3       levETIRAcetam 100 MG/ML solution   Commonly known as:  KEPPRA   Used for:  Seizure (H)   Replaces:  levETIRAcetam 750 MG tablet        Dose:  750 mg   Take 7.5 mLs (750 mg) by mouth every 12 hours   Quantity:  500 mL   Refills:  3       linezolid 600 MG tablet   Commonly known as:  ZYVOX   Used for:  Sepsis due to urinary tract infection (H)        Dose:  600 mg   Take 1 tablet (600 mg) by mouth 2 times daily for 4 days   Quantity:  9 tablet   Refills:  0         CONTINUE these medicines which may have CHANGED, or have new prescriptions. If we are uncertain of the size of tablets/capsules you have at home, strength may be listed as something that might have changed.        Dose / Directions    ketotifen 0.025 % Soln ophthalmic solution   Commonly known as:  ZADITOR/REFRESH ANTI-ITCH   This may have changed:  when to take this   Used for:  Allergic conjunctivitis, bilateral        Dose:  1 drop   Place 1 drop into both eyes 2 times daily   Quantity:  1 Bottle   Refills:  11       simethicone 80 MG chewable tablet   Commonly known as:  MYLICON   This may have changed:    - when to take this  - reasons to take this   Used for:  Slow transit constipation        Dose:  80 mg   Take 1 tablet (80 mg) by mouth 4 times daily   Quantity:  180 tablet   Refills:  5       warfarin 1 MG tablet   Commonly known as:  COUMADIN   This may have changed:  how much to take   Used for:  LVAD (left ventricular assist device) present (H)        Dose:  3 mg   Take 3 tablets (3 mg) by mouth daily   Quantity:  30 tablet   Refills:  1         CONTINUE these medicines which have NOT CHANGED        Dose / Directions    acetaminophen 325 MG tablet   Commonly known as:  TYLENOL        Dose:  650 mg   Take 2 tablets (650 mg) by mouth every 6 hours as needed for mild pain   Quantity:  100 tablet   Refills:  0        ALEK CONTOUR test strip   Used for:  Hypoglycemia   Generic drug:  blood glucose monitoring        USE TO TEST BLOOD SUGAR 2 TIMES DAILY OR AS DIRECTED.   Quantity:  100 strip   Refills:  2       bisacodyl 10 MG Suppository   Commonly known as:  DULCOLAX   Used for:  Drug-induced constipation        Dose:  10 mg   Place 1 suppository (10 mg) rectally daily as needed for constipation   Quantity:  5 suppository   Refills:  1       * blood glucose monitoring lancets   Used for:  Diabetes mellitus, type 2 (H)        Use to test blood sugars 2 times daily or as directed.   Quantity:  1 Box   Refills:  3       * blood glucose monitoring lancets   Used for:  Diabetes mellitus, type 2 (H)        USE TO TEST BLOOD SUGAR 2 TIMES DAILY OR AS DIRECTED   Quantity:  100 each   Refills:  2       finasteride 5 MG tablet   Commonly known as:  PROSCAR   Used for:  Incomplete bladder emptying        Dose:  5 mg   Take 1 tablet (5 mg) by mouth daily   Quantity:  90 tablet   Refills:  3       furosemide 20 MG tablet   Commonly known as:  LASIX   Used for:  Chronic systolic congestive heart failure (H)        Dose:  20 mg   Take 1 tablet (20 mg) by mouth as needed   Quantity:  20 tablet   Refills:  11       ipratropium - albuterol 0.5 mg/2.5 mg/3 mL 0.5-2.5 (3) MG/3ML neb solution   Commonly known as:  DUONEB   Used for:  Pulmonary atelectasis        Dose:  1 vial   Take 1 vial (3 mLs) by nebulization every 6 hours as needed for shortness of breath / dyspnea, wheezing or other   Quantity:  360 mL   Refills:  11       loratadine 10 MG tablet   Commonly known as:  CLARITIN   Used for:  Allergic rhinitis        TAKE 1 TABLET (10 MG) BY MOUTH DAILY   Quantity:  90 tablet   Refills:  2       losartan 25 MG tablet   Commonly known as:  COZAAR   Used for:  Chronic systolic congestive heart failure (H)        Dose:  25 mg   Take 1 tablet (25 mg) by mouth daily   Quantity:  15 tablet   Refills:  3       MELATONIN PO        Dose:  5 mg   Take  5 mg by mouth At Bedtime   Refills:  0       nitroGLYcerin 0.4 MG sublingual tablet   Commonly known as:  NITROSTAT   Used for:  Coronary artery disease involving native coronary artery with unstable angina pectoris (H)        Dose:  0.4 mg   Place 1 tablet (0.4 mg) under the tongue every 5 minutes as needed for chest pain   Quantity:  30 tablet   Refills:  1       order for DME   Used for:  Fracture of femoral neck, right, closed, initial encounter, Stroke (H), CHF (congestive heart failure), NYHA class IV (H)        Equipment being ordered: Lift chair.  Please see indications and face-to-face encounter details in 2/3/15 Office Visit note.   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   Quantity:  1 each   Refills:  0       * order for DME   Used for:  Cerebral infarction due to  embolism of right middle cerebral artery (H), Displaced fracture of right femoral neck, closed, with nonunion, subsequent encounter        Equipment being ordered: Wheelchair, manual, with elevated leg rests and tilt in space back.  Please fax to [facility]; I called them 11/26/16 and they requested the new order.  Face to face is in my 10/26/16 note.   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   Quantity:  2 each   Refills:  0       * order for DME   Used for:  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)        Bilateral heel protective cushioning boots.  Dx: previous stroke with left hemiplegia.  Duration of need: 99 months.   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, subsequent encounter        Dose:  5 mg   Take 1 tablet (5 mg) by mouth every 4 hours as needed for moderate to severe pain   Quantity:  30 tablet   Refills:  0       PANTOPRAZOLE SODIUM PO        Dose:  20 mg   Take 20 mg by mouth every morning (before breakfast)   Refills:  0       senna-docusate 8.6-50 MG per tablet   Commonly known as:  SENEXON-S   Used for:  Other constipation        Dose:  2 tablet   Take 2 tablets by mouth 2 times daily as needed for constipation   Quantity:  60 tablet   Refills:  1       tamsulosin 0.4 MG capsule   Commonly known as:  FLOMAX   Used for:  Incomplete bladder emptying        Dose:  0.8 mg   Take 2 capsules (0.8 mg) by mouth daily   Quantity:  60 capsule   Refills:  11       thiamine 100 MG tablet   Used for:  Cerebrovascular accident (CVA) due to embolism of right middle cerebral artery (H)         TAKE 1 TABLET (100 MG) BY MOUTH DAILY   Quantity:  90 tablet   Refills:  3       TOPROL XL PO        Dose:  50 mg   Take 50 mg by mouth daily   Refills:  0       * Notice:  This list has 9 medication(s) that are the same as other medications prescribed for you. Read the directions carefully, and ask your doctor or other care provider to review them with you.      STOP taking     levETIRAcetam 750 MG tablet   Commonly known as:  KEPPRA   Replaced by:  levETIRAcetam 100 MG/ML solution                Where to get your medicines      These medications were sent to [facility] [place], [place] - [address] [address]     Phone:  [phone]     aspirin 81 MG tablet    levETIRAcetam 100 MG/ML solution    linezolid 600 MG tablet    warfarin 1 MG tablet                Protect others around you: Learn how to safely use, store and throw away your medicines at www.disposemymeds.org.        ANTIBIOTIC INSTRUCTION     You've Been Prescribed an Antibiotic - Now What?  Your healthcare team thinks that you or your loved one might have an infection. Some infections can be treated with antibiotics, which are powerful, life-saving drugs. Like all medications, antibiotics have side effects and should only be used when necessary. There are some important things you should know about your antibiotic treatment.      Your healthcare team may run tests before you start taking an antibiotic.    Your team may take samples (e.g., from your blood, urine or other areas) to run tests to look for bacteria. These test can be important to determine if you need an antibiotic at all and, if you do, which antibiotic will work best.      Within a few days, your healthcare team might change or even stop your antibiotic.    Your team may start you on an antibiotic while they are working to find out what is making you sick.    Your team might change your antibiotic because test results show  that a different antibiotic would be better to treat your infection.    In some cases, once your team has more information, they learn that you do not need an antibiotic at all. They may find out that you don't have an infection, or that the antibiotic you're taking won't work against your infection. For example, an infection caused by a virus can't be treated with antibiotics. Staying on an antibiotic when you don't need it is more likely to be harmful than helpful.      You may experience side effects from your antibiotic.    Like all medications, antibiotics have side effects. Some of these can be serious.    Let you healthcare team know if you have any known allergies when you are admitted to the hospital.    One significant side effect of nearly all antibiotics is the risk of severe and sometimes deadly diarrhea caused by Clostridium difficile (C. Difficile). This occurs when a person takes antibiotics because some good germs are destroyed. Antibiotic use allows C. diificile to take over, putting patients at high risk for this serious infection.    As a patient or caregiver, it is important to understand your or your loved one's antibiotic treatment. It is especially important for caregivers to speak up when patients can't speak for themselves. Here are some important questions to ask your healthcare team.    What infection is this antibiotic treating and how do you know I have that infection?    What side effects might occur from this antibiotic?    How long will I need to take this antibiotic?    Is it safe to take this antibiotic with other medications or supplements (e.g., vitamins) that I am taking?     Are there any special directions I need to know about taking this antibiotic? For example, should I take it with food?    How will I be monitored to know whether my infection is responding to the antibiotic?    What tests may help to make sure the right antibiotic is prescribed for me?      Information  provided by:  www.cdc.gov/getsmart  U.S. Department of Health and Human Services  Centers for disease Control and Prevention  National Center for Emerging and Zoonotic Infectious Diseases  Division of Healthcare Quality Promotion             Medication List: This is a list of all your medications and when to take them. Check marks below indicate your daily home schedule. Keep this list as a reference.      Medications           Morning Afternoon Evening Bedtime As Needed    acetaminophen 325 MG tablet   Commonly known as:  TYLENOL   Take 2 tablets (650 mg) by mouth every 6 hours as needed for mild pain   Last time this was given:  650 mg on 5/12/2018  4:41 AM                                aspirin 81 MG tablet   Take 1 tablet (81 mg) by mouth daily                                ALEK CONTOUR test strip   USE TO TEST BLOOD SUGAR 2 TIMES DAILY OR AS DIRECTED.   Generic drug:  blood glucose monitoring                                bisacodyl 10 MG Suppository   Commonly known as:  DULCOLAX   Place 1 suppository (10 mg) rectally daily as needed for constipation                                * blood glucose monitoring lancets   Use to test blood sugars 2 times daily or as directed.                                * blood glucose monitoring lancets   USE TO TEST BLOOD SUGAR 2 TIMES DAILY OR AS DIRECTED                                finasteride 5 MG tablet   Commonly known as:  PROSCAR   Take 1 tablet (5 mg) by mouth daily   Last time this was given:  5 mg on 5/12/2018  8:07 AM                                furosemide 20 MG tablet   Commonly known as:  LASIX   Take 1 tablet (20 mg) by mouth as needed                                ipratropium - albuterol 0.5 mg/2.5 mg/3 mL 0.5-2.5 (3) MG/3ML neb solution   Commonly known as:  DUONEB   Take 1 vial (3 mLs) by nebulization every 6 hours as needed for shortness of breath / dyspnea, wheezing or other                                ketotifen 0.025 % Soln ophthalmic solution    Commonly known as:  ZADITOR/REFRESH ANTI-ITCH   Place 1 drop into both eyes 2 times daily   Last time this was given:  1 drop on 5/12/2018  8:07 AM                                levETIRAcetam 100 MG/ML solution   Commonly known as:  KEPPRA   Take 7.5 mLs (750 mg) by mouth every 12 hours   Last time this was given:  750 mg on 5/12/2018 10:37 AM                                linezolid 600 MG tablet   Commonly known as:  ZYVOX   Take 1 tablet (600 mg) by mouth 2 times daily for 4 days                                loratadine 10 MG tablet   Commonly known as:  CLARITIN   TAKE 1 TABLET (10 MG) BY MOUTH DAILY   Last time this was given:  10 mg on 5/12/2018  8:07 AM                                losartan 25 MG tablet   Commonly known as:  COZAAR   Take 1 tablet (25 mg) by mouth daily   Last time this was given:  25 mg on 5/12/2018  8:06 AM                                MELATONIN PO   Take 5 mg by mouth At Bedtime   Last time this was given:  5 mg on 5/11/2018 11:10 PM                                nitroGLYcerin 0.4 MG sublingual tablet   Commonly known as:  NITROSTAT   Place 1 tablet (0.4 mg) under the tongue every 5 minutes as needed for chest pain                                order for DME   Equipment being ordered: Lift chair.  Please see indications and face-to-face encounter details in 2/3/15 Office Visit note.                                * order for DME   Equipment being ordered: Bilateral leg supports for manual wheelchair.                                * order for DME   Equipment being ordered: Reclining manual w/c with bilateral elevating leg rests.                                * order for DME   Equipment being ordered: Hospital Bed, fully electric.                                * order for DME   Equipment being ordered: Wheelchair, manual.                                * order for DME   Equipment being ordered: Wheelchair, manual, with elevated leg rests and tilt in space back.  Please fax to  [facility]; I called them 11/26/16 and they requested the new order.  Face to face is in my 10/26/16 note.                                * order for DME   Equipment being ordered: Cushioned heel boots.                                * order for DME   Bilateral heel protective cushioning boots.  Dx: previous stroke with left hemiplegia.  Duration of need: 99 months.                                order for DME   Equipment being ordered: Nebulizer                                oxyCODONE IR 5 MG tablet   Commonly known as:  ROXICODONE   Take 1 tablet (5 mg) by mouth every 4 hours as needed for moderate to severe pain                                PANTOPRAZOLE SODIUM PO   Take 20 mg by mouth every morning (before breakfast)   Last time this was given:  20 mg on 5/12/2018  8:07 AM                                senna-docusate 8.6-50 MG per tablet   Commonly known as:  SENEXON-S   Take 2 tablets by mouth 2 times daily as needed for constipation   Last time this was given:  2 tablets on 5/11/2018  9:00 AM                                simethicone 80 MG chewable tablet   Commonly known as:  MYLICON   Take 1 tablet (80 mg) by mouth 4 times daily                                tamsulosin 0.4 MG capsule   Commonly known as:  FLOMAX   Take 2 capsules (0.8 mg) by mouth daily   Last time this was given:  0.8 mg on 5/12/2018  8:06 AM                                thiamine 100 MG tablet   TAKE 1 TABLET (100 MG) BY MOUTH DAILY   Last time this was given:  100 mg on 5/12/2018  8:06 AM                                TOPROL XL PO   Take 50 mg by mouth daily   Last time this was given:  50 mg on 5/12/2018  8:07 AM                                warfarin 1 MG tablet   Commonly known as:  COUMADIN   Take 3 tablets (3 mg) by mouth daily   Last time this was given:  3 mg on 5/11/2018  6:05 PM                                * Notice:  This list has 9 medication(s) that are the same as other medications prescribed for you. Read the  directions carefully, and ask your doctor or other care provider to review them with you.

## 2018-05-10 NOTE — ED NOTES
Bed: ED01  Expected date:   Expected time:   Means of arrival:   Comments:  EMS Service:  HEMS [id number]    Med/Trauma C/o:  68-Male; Left-sided weakness (no onset given); has an LVAD; no PIV established; Blood sugar:  191.    Triaged:  **RED**    ETA:  1033am    Sanjiv Bradley  May 10, 2018  10:27 AM

## 2018-05-10 NOTE — PHARMACY-ADMISSION MEDICATION HISTORY
Admission medication history interview status for the 5/10/2018 admission is complete. See Epic admission navigator for allergy information, pharmacy, prior to admission medications and immunization status.     Medication history interview sources: patient, daughter    Changes made to PTA medication list (reason)  Added: none  Deleted:   -Nystatin 10,000 unit/gm powder apply to affected areas of skin in the groin and on the scrotum three times a day as needed ---per patient report he does not take this anymore  Changed:   -Simethicone 80 mg four times daily --> four times daily as needed (per daughter report)  -Toprol XL 50 mg by mouth every morning and 25 mg by mouth every evening --> 50 mg by mouth daily (daughter was adamant patient only takes 50 mg)    Additional medication history information:  -The patient's daughter manages his medications and reported his medication history.   -The patient is on warfarin for LVAD prophylaxis, followed by the [facility], with a goal INR of 1.8-2.3.  -The patient has received his influenza vaccine (10/2017) and denied any additional over-the-counter/prescription medications.     Prior to Admission medications    Medication Sig Last Dose Taking? Auth Provider   acetaminophen (TYLENOL) 325 MG tablet Take 2 tablets (650 mg) by mouth every 6 hours as needed for mild pain 5/10/2018 at Unknown time Yes    ALEK CONTOUR test strip USE TO TEST BLOOD SUGAR 2 TIMES DAILY OR AS DIRECTED. 5/10/2018 at Unknown time Yes Thomas Dill MD   bisacodyl (DULCOLAX) 10 MG Suppository Place 1 suppository (10 mg) rectally daily as needed for constipation Past Month at Unknown time Yes Gabe Peters MD   blood glucose monitoring (ALEK MICROLET) lancets USE TO TEST BLOOD SUGAR 2 TIMES DAILY OR AS DIRECTED 5/10/2018 at Unknown time Yes Thomas Dlil MD   blood glucose monitoring (NO BRAND SPECIFIED) lancets Use to test blood sugars 2 times daily or as directed.  5/10/2018 at Unknown time Yes Thomas Dill MD   finasteride (PROSCAR) 5 MG tablet Take 1 tablet (5 mg) by mouth daily 5/10/2018 at Unknown time Yes Lenore Gardner PA   furosemide (LASIX) 20 MG tablet Take 1 tablet (20 mg) by mouth as needed Past Month at Unknown time Yes Vicky Ziegler NP   ipratropium - albuterol 0.5 mg/2.5 mg/3 mL (DUONEB) 0.5-2.5 (3) MG/3ML neb solution Take 1 vial (3 mLs) by nebulization every 6 hours as needed for shortness of breath / dyspnea, wheezing or other Past Month at Unknown time Yes Shilpi Agosto APRN CNP   ketotifen (ZADITOR/REFRESH ANTI-ITCH) 0.025 % SOLN ophthalmic solution Place 1 drop into both eyes 2 times daily  Patient taking differently: Place 1 drop into both eyes daily  5/9/2018 at Unknown time Yes Delgado Agosto MD   levETIRAcetam (KEPPRA) 750 MG tablet TAKE 1 TABLET (750 MG) BY MOUTH 2 TIMES DAILY 5/10/2018 at AM Yes Thomas Dill MD   loratadine (CLARITIN) 10 MG tablet TAKE 1 TABLET (10 MG) BY MOUTH DAILY 5/10/2018 at Unknown time Yes Thomas Dill MD   losartan (COZAAR) 25 MG tablet Take 1 tablet (25 mg) by mouth daily 5/9/2018 at Unknown time Yes Zuleika Patterson APRN CNP   MELATONIN PO Take 5 mg by mouth At Bedtime 5/9/2018 at Unknown time Yes Unknown, Entered By History   Metoprolol Succinate (TOPROL XL PO) Take 50 mg by mouth daily  5/10/2018 at Unknown time Yes Unknown, Entered By History   ORDER FOR DME Equipment being ordered: Lift chair.    Please see indications and face-to-face encounter details in 2/3/15 Office Visit note. 5/10/2018 at Unknown time Yes Thomas Dill MD   oxyCODONE (ROXICODONE) 5 MG IR tablet Take 1 tablet (5 mg) by mouth every 4 hours as needed for moderate to severe pain Past Month at Unknown time Yes Jess Carter APRN CNP   PANTOPRAZOLE SODIUM PO Take 20 mg by mouth every morning (before breakfast) 5/10/2018 at Unknown time Yes Reported, Patient   senna-docusate (SENEXON-S)  8.6-50 MG per tablet Take 2 tablets by mouth 2 times daily as needed for constipation 5/10/2018 at Unknown time Yes Zuleika Patterson APRN CNP   simethicone (MYLICON) 80 MG chewable tablet Take 1 tablet (80 mg) by mouth 4 times daily  Patient taking differently: Take 80 mg by mouth 4 times daily as needed (bloating)  5/7/2018 at Unknown time Yes Jess Carter APRN CNP   tamsulosin (FLOMAX) 0.4 MG capsule Take 2 capsules (0.8 mg) by mouth daily 5/10/2018 at Unknown time Yes Raghu Almodovar MD   Thiamine HCl (VITAMIN B-1) 100 MG TABS TAKE 1 TABLET (100 MG) BY MOUTH DAILY 5/9/2018 at Unknown time Yes Thomas Dill MD   warfarin (COUMADIN) 1 MG tablet Take 2.5 tablets (2.5 mg) by mouth daily 5/9/2018 at Unknown time Yes Zuleika Patterson APRN CNP   nitroglycerin (NITROSTAT) 0.4 MG SL tablet Place 1 tablet (0.4 mg) under the tongue every 5 minutes as needed for chest pain Unknown at Unknown time  Jess Carter APRN CNP   order for DME Equipment being ordered: Bilateral leg supports for manual wheelchair. Unknown at Unknown time  Thomas Dill MD   order for DME Equipment being ordered: Reclining manual w/c with bilateral elevating leg rests. Unknown at Unknown time  Thomas Dill MD   order for DME Equipment being ordered: Hospital Bed, fully electric. Unknown at Unknown time  Thomas Dill MD   order for DME Equipment being ordered: Wheelchair, manual. Unknown at Unknown time  Thomas Dill MD   order for DME Equipment being ordered: Wheelchair, manual, with elevated leg rests and tilt in space back.  Please fax to [facility]; I called them 11/26/16 and they requested the new order.  Face to face is in my 10/26/16 note. Unknown at Unknown time  Thomas Dill MD   order for DME Equipment being ordered: Cushioned heel boots. Unknown at Unknown time  Thomas Dill MD   order for DME Bilateral heel protective cushioning boots.  Dx:  previous stroke with left hemiplegia.  Duration of need: 99 months. Unknown at Unknown time  Thomas Dill MD   order for DME Equipment being ordered: Nebulizer Unknown at Unknown time  Zuleika Patterson APRN CNP     Medication history completed by: Janel Leggett, Pharm.D.

## 2018-05-10 NOTE — H&P
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     Patient is accompanied by his daughter. History is obtained from both. Patient was last seen normal at 11pm day prior to admission. He woke up today with confusion- unable to state where he is or the date. Daughter was concerned that he may have had a new stroke so she brought him into the ED.      On interview, patient's confusion has improved. He knows where he is and what day it is. He responds appropriately to commands. Denies pain. Denies headaches although he did have headache a few days ago which have since improved with tylenol. Denies fevers, chills, chest pain, SOB, abdominal pain, n/v, constipation or diarrhea. Reports pressure with urination but no burning or urgency. No trouble with swallowing. No new weakness noted- at baseline he is immobile and had decreased strength in LEs and left upper extremity. No weight gain, edema, orthopnea, or PND. Compliant with his home meds.     Patient was seen in ED on 5/7 for headaches. He had a UA drawn at that time which came back positive and the culture came back positive for VRE, sensitive to Linezolid.    Patient was seen by Neurology in ED- CT head concerning for subacute ischemic stroke, no sign of hemorrhage. Admitted to Cards for further workup.            Past Medical History:     Past Medical History:   Diagnosis Date     Acute right  MCA stroke (H) 6/2013    With L sided hemiparesis      Anemia of chronic disease     Baseline Hb 8-9     BPH (benign prostatic hyperplasia)      CHF (congestive heart failure), NYHA class IV (H) 10/9/2012    s/p HeartMate II.  Was on milrinone and dobutamine prior to LVAD      CKD (chronic kidney disease)     Baseline Cr=     Clostridium difficile colitis 12/2012      Dysphagia     PEG tube placed in 2012.  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  Normal carotid duplex 3/2014.       Esophagitis 12/2012    EGD with esophagitis and multiple douenal ulcers     Fracture of femoral neck, right, closed (H) 2/3/2015    Presumed pathologic as patient is non-weight-bearing and suffered no trauma.  Family declined operative repair during hospital stay 1/23 - 1/30/15.     Gastric and duodenal angiodysplasia with hemorrhage 6/18/2015     GERD (gastroesophageal reflux disease)      Gout      Hematuria      HTN (hypertension)     LDL=59 (3/29/2014)     Hyperlipaemia      Ischemic cardiomyopathy      Mitral regurgitation     s/p MVR with Carbomedics ring      Myocardial infarction 1998    In [facility] without intervention      Nonsenile cataract     BE     PFO (patent foramen ovale)     s/p closure (10/9/2012)     TB lung, latent     s/p 9 months INH in 2012             Past Surgical History:     Past Surgical History:   Procedure Laterality Date     C CABG, VEIN, FIVE  2001     CATARACT IOL, RT/LT Right 11/19/2015     ENDOSCOPIC RETROGRADE CHOLANGIOPANCREATOGRAM N/A 5/9/2017    Procedure: COMBINED ENDOSCOPIC RETROGRADE CHOLANGIOPANCREATOGRAPHY, PLACE TUBE/STENT;  Endoscopic Retrograde Cholangiopancreatogram, Stent (Zimmon Biliary 7fr 12cm) Placement;  Surgeon: Cristo Grove MD;  Location: [facility] OR     ESOPHAGOSCOPY, GASTROSCOPY, DUODENOSCOPY (EGD), COMBINED N/A 6/10/2015    Procedure: COMBINED ESOPHAGOSCOPY, GASTROSCOPY,  DUODENOSCOPY (EGD);  Surgeon: Edu Jenkins MD;  Location: [facility]     ESOPHAGOSCOPY, GASTROSCOPY, DUODENOSCOPY (EGD), COMBINED N/A 6/15/2015    Procedure: COMBINED ESOPHAGOSCOPY, GASTROSCOPY, DUODENOSCOPY (EGD);  Surgeon: Yury Bonner MD;  Location: [facility] OR     H INSERT ICD  2/10/2011    And pacemaker.  Not BiV     INSERT VENTRICULAR ASSIST DEVICE LEFT (HEARTMATE II)  10/9/2012     IR GASTRO JEJUNOSTOMY TUBE PLACEMENT       PHACOEMULSIFICATION CLEAR CORNEA WITH STANDARD INTRAOCULAR LENS IMPLANT Right 11/19/2015    Procedure: PHACOEMULSIFICATION CLEAR CORNEA WITH STANDARD INTRAOCULAR LENS IMPLANT;  Surgeon: Violeta Cosme MD;  Location: [facility] OR     REPAIR PATENT FORAMEN OVALE  10/9/2012     REPAIR VALVE MITRAL  2/9/2012    28 mm Carbomedics 2/3 ring             Social History:   Lives with daughter who is his primary care taker.   Social History     Social History     Marital status:      Spouse name: N/A     Number of children: N/A     Years of education: N/A     Occupational History     Not on file.     Social History Main Topics     Smoking status: Former Smoker     Smokeless tobacco: Former User     Alcohol use No     Drug use: No     Sexual activity: Not on file     Other Topics Concern     Not on file     Social History Narrative            Family History:     Family History   Problem Relation Age of Onset     Family History Negative No family hx of      Glaucoma No family hx of      Macular Degeneration No family hx of      CANCER No family hx of      no skin cancer            Immunizations:     Immunization History   Administered Date(s) Administered     Influenza (IIV3) PF 03/04/2014, 10/08/2015     Influenza Vaccine IM 3yrs+ 4 Valent IIV4 09/25/2014, 10/20/2016, 10/01/2017     Pneumococcal 23 valent 08/17/2014              Allergies:      Allergies   Allergen Reactions     Seasonal Allergies             Medications:     Prior to Admission Medications   Prescriptions Last Dose Informant  Patient Reported? Taking?   ALEK CONTOUR test strip 5/10/2018 at Unknown time  No Yes   Sig: USE TO TEST BLOOD SUGAR 2 TIMES DAILY OR AS DIRECTED.   MELATONIN PO 5/10/2018 at Unknown time  Yes Yes   Sig: Take 5 mg by mouth At Bedtime   Metoprolol Succinate (TOPROL XL PO) 5/10/2018 at Unknown time Daughter Yes Yes   Sig: Take 50mg by mouth every morning. Take 25mg by mouth every evening.   ORDER FOR DME 5/10/2018 at Unknown time  No Yes   Sig: Equipment being ordered: Lift chair.    Please see indications and face-to-face encounter details in 2/3/15 Office Visit note.   PANTOPRAZOLE SODIUM PO 5/10/2018 at Unknown time  Yes Yes   Sig: Take 20 mg by mouth every morning (before breakfast)   Thiamine HCl (VITAMIN B-1) 100 MG TABS 5/10/2018 at Unknown time  No Yes   Sig: TAKE 1 TABLET (100 MG) BY MOUTH DAILY   acetaminophen (TYLENOL) 325 MG tablet Unknown at Unknown time  Yes No   Sig: Take 2 tablets (650 mg) by mouth every 6 hours as needed for mild pain   bisacodyl (DULCOLAX) 10 MG Suppository Unknown at Unknown time Daughter No No   Sig: Place 1 suppository (10 mg) rectally daily as needed for constipation   blood glucose monitoring (ALEK MICROLET) lancets 5/10/2018 at Unknown time  No Yes   Sig: USE TO TEST BLOOD SUGAR 2 TIMES DAILY OR AS DIRECTED   blood glucose monitoring (NO BRAND SPECIFIED) lancets 5/10/2018 at Unknown time  No Yes   Sig: Use to test blood sugars 2 times daily or as directed.   finasteride (PROSCAR) 5 MG tablet 5/10/2018 at Unknown time Daughter No Yes   Sig: Take 1 tablet (5 mg) by mouth daily   furosemide (LASIX) 20 MG tablet 5/10/2018 at Unknown time Daughter No Yes   Sig: Take 1 tablet (20 mg) by mouth as needed   ipratropium - albuterol 0.5 mg/2.5 mg/3 mL (DUONEB) 0.5-2.5 (3) MG/3ML neb solution Unknown at Unknown time  No No   Sig: Take 1 vial (3 mLs) by nebulization every 6 hours as needed for shortness of breath / dyspnea, wheezing or other   ketotifen (ZADITOR/REFRESH ANTI-ITCH) 0.025 %  SOLN ophthalmic solution Unknown at Unknown time Daughter No No   Sig: Place 1 drop into both eyes 2 times daily   Patient taking differently: Place 1 drop into both eyes daily    levETIRAcetam (KEPPRA) 750 MG tablet 5/10/2018 at Unknown time Daughter No Yes   Sig: TAKE 1 TABLET (750 MG) BY MOUTH 2 TIMES DAILY   loratadine (CLARITIN) 10 MG tablet 5/10/2018 at Unknown time  No Yes   Sig: TAKE 1 TABLET (10 MG) BY MOUTH DAILY   losartan (COZAAR) 25 MG tablet 5/10/2018 at Unknown time  Yes Yes   Sig: Take 1 tablet (25 mg) by mouth daily   nitroglycerin (NITROSTAT) 0.4 MG SL tablet Unknown at Unknown time Daughter No No   Sig: Place 1 tablet (0.4 mg) under the tongue every 5 minutes as needed for chest pain   nystatin (MYCOSTATIN) [id number] UNIT/GM POWD Unknown at Unknown time  No No   Sig: Apply to affected areas of skin in the groin and on the scrotum three times a day as needed.   order for DME Unknown at Unknown time  No No   Sig: Equipment being ordered: Bilateral leg supports for manual wheelchair.   order for DME Unknown at Unknown time  No No   Sig: Equipment being ordered: Reclining manual w/c with bilateral elevating leg rests.   order for DME Unknown at Unknown time  No No   Sig: Equipment being ordered: Hospital Bed, fully electric.   order for DME Unknown at Unknown time  No No   Sig: Equipment being ordered: Wheelchair, manual.   order for DME Unknown at Unknown time  No No   Sig: Equipment being ordered: Wheelchair, manual, with elevated leg rests and tilt in space back.  [facility] I [facility] 11/26/16 and they requested the new order.  Face to face is in my 10/26/16 note.   order for DME Unknown at Unknown time  No No   Sig: Equipment being ordered: Cushioned heel boots.   order for DME Unknown at Unknown time  No No   Sig: Bilateral heel protective cushioning boots.  Dx: previous stroke with left hemiplegia.  Duration of need: 99 months.   order for DME Unknown at Unknown time  No No   Sig:  Equipment being ordered: Nebulizer   oxyCODONE (ROXICODONE) 5 MG IR tablet Unknown at Unknown time  No No   Sig: Take 1 tablet (5 mg) by mouth every 4 hours as needed for moderate to severe pain   senna-docusate (SENEXON-S) 8.6-50 MG per tablet 5/10/2018 at Unknown time  No Yes   Sig: Take 2 tablets by mouth 2 times daily as needed for constipation   simethicone (MYLICON) 80 MG chewable tablet 5/10/2018 at Unknown time Daughter No Yes   Sig: Take 1 tablet (80 mg) by mouth 4 times daily   tamsulosin (FLOMAX) 0.4 MG capsule 5/10/2018 at Unknown time  No Yes   Sig: Take 2 capsules (0.8 mg) by mouth daily   warfarin (COUMADIN) 1 MG tablet   No No   Sig: Take 2.5 tablets (2.5 mg) by mouth daily      Facility-Administered Medications: None             Review of Systems:   A complete, 10 point ROS was performed and is negative other than what is stated in the HPI.         Physical Exam:   Blood pressure 101/82, temperature 97.2  F (36.2  C), temperature source Oral, resp. rate 9, weight 89.2 kg (196 lb 10.4 oz), SpO2 99 %.    Gen: NAD, lying in bed comfortably   HEENT: EOMI, PERRL, sclera anicteric, no adenopathy   CV: RRR, S1 S2 normal, no m/r/g  Resp: CTAB, no crackles, wheezing, or rhonchi   Abdomen: Soft, non tender abdomen, normal active bowel sounds  Extremities: No peripheral edema, warm  Skin: without rash or trauma observed on exposed skin   Neuro:   Oriented to - place and time   Pupils are equal and reactive to light, EOMI, no nystagmus  Tongue protrudes midline   Shoulder shrug asymmetric R > L  5/5 strength in the RUE, 1/5 in LUE  0/5 strength in bilateral LEs   2/2+ biceps reflexes   Negative babinski          Data:   Labs and Imaging Reviewed in Chart   Pertinent studies as below:  CT Head w/ subacute infarction in left occipital live in the distribution of the left PCA.  CXR negative    UA positive for LE, negative for nitrites, 7 WBCs. UA from 5/7 culture growing VRE sensitive to linezolid.            Assessment and Plan:     Sohan Rendon is a 68 year old male with h/o ICM s/p LVAD HM2 DT (2012) on warfarin, s/p MVR, multiple ischemic CVAs w/ residual left-sided weakness (immobile at baseline), latent TB, HTN, HLD, PFO s/p closure in 2012, CKDIII, BPH, GERD, gout who presents to the ED today for confusion.    # AMS  # Hx CVAs with residual left-sided hemiparesis   # New Subacute CVA   Presented with confusion for one day, improved on own in ED. CT head notable for new CVA in left occipital lobe. Discussed with Neurology, CVA appears to be subacute and ischemic with no evidence of hemorrhage. Prior CVA related to an LV thrombus, will obtain Echo for further evaluation but unlikely to .   - Echo  - Neurology recommended CTA Neck, given patient's CKD and low GFR, will need to discuss further risks vs benefits of contrast  - Lipid panel  - Speech/PT/OT consults  - Bedside speech swallow     # VRE UTI  UCx from 5/7 growing VRE, reports pressure with urination. Part of AMS could be from UTI.   - Linezolid (5/10 ->   - Follow Ucx and BCxs from 5/10     # ICM s/p HM II LVAD  Hx of recurrent device thrombi and multiple embolic CVAs. Per Neuro, CVA this time appears to be ischemic but will get Echo for further evaluation.   - ACEi/ARB: losartan 25mg daily  - BB: Metoprolol 50mg AM  - Aldosterone antagonist: none  - Anticoagulation: warfarin, goal 1.8 - 2.5    - Antiplatelet: none  - Statin: d/c recently per pt wanting to reduce his meds     # Lactic Acidosis  LA 2.4, not septic. Poor renal function likely affecting clearance of lactate.   - 250cc IVF over 2hrs  - Recheck this evening    Chronic Problems:   # CKD Stage III: Cr at baseline ~1.7-2.1, 2.01 on admission, will continue to monitor  # HTN: continue PTA medications as above  # Chronic Normocytic Anemia: no active signs of bleeding, will continue to monitor  # Seizures s/p Multiple CVAs: continue keppra 750 mg BID, pt wants IV b/c he can't swallow  the big pill   # BPH: continue PTA tamsulosin and finasteride  # HLD: d/c atorvastatin recently   # Gout: no signs of active flare, allopurinol d/c recently to limit his meds   # GERD: continue PTA pantoprazole  # Insomnia: continue PTA melatonin  # Chronic Pain Syndrome: continue PTA oxycodone PRN      Access: PIV  Diet/IVF: 2g Na diet, 2L fluid restriction, 250 cc bolus   DVT ppx: warfarin   Disposition/Admission Status: Admit to Cards for further evaluation of CVA, anticipate length of stay to be greater than 2 midnights.     CODE: Full     Patient was seen and discussed with Dr. Phipps.     Joey Champion MD  IM, PGY-1  Cardiology Service   I personally provided care for this patient, reviewed chart, discussed course with patient, housestaff and consulting physicians.  I answered all questions.  Needs parenteral UTI medications.  He has evidence for new stroke.    Spencer Phipps M.D.  Division of Cardiology  Department of Medicine

## 2018-05-10 NOTE — ED TRIAGE NOTES
"Pt arrived to the ED today with confusions and hallucinations. Per family pt was \"normal\" yesterday and last night. Pt has a history of stroke with left sided weakness.   "

## 2018-05-10 NOTE — MR AVS SNAPSHOT
Sohan Justice   5/10/2018   Anticoagulation Therapy Visit    Description:  68 year old male   Provider:  Blanca Bah, RN   Department:  [facility]           INR as of 5/10/2018     Today's INR 1.9      Anticoagulation Summary as of 5/10/2018     INR goal    Prior goal 1.8-2.5   Today's INR 1.9   Full instructions 5/10: 3 mg; 5/11: 3 mg; 5/12: 3 mg; 5/13: 4.5 mg   Next INR check 5/14/2018    Indications   LVAD (left ventricular assist device) present [Z95.811]  Long-term (current) use of anticoagulants [Z79.01] [Z79.01]         May 2018 Details    Sun Mon Tue Wed Thu Fri Sat       1               2               3               4               5                 6               7               8               9               10      3 mg   See details      11      3 mg         12      3 mg           13      4.5 mg         14            15               16               17               18               19                 20               21               22               23               24               25               26                 27               28               29               30               31                  Date Details   05/10 This INR check       Date of next INR:  5/14/2018         How to take your warfarin dose     To take:  3 mg Take 1 of the 3 mg tablets.    To take:  4.5 mg Take 1.5 of the 3 mg tablets.

## 2018-05-10 NOTE — CONSULTS
[facility], [place]      Neurology Stroke Consult    Patient Name: Sohan Rendon  : 1950 MRN#: [id number]    STROKE DATA    Stroke Code:  Stroke code not activated.  Time patient seen:  05/10/2018 1200  Last known normal (pt's baseline):  18 2300    TPA treatment:  Not given due to outside the time window.     National Institutes of Health Stroke Scale (at presentation)  NIHSS done at:  time patient seen      Score    Level of consciousness:  (0)   Alert, keenly responsive     LOC questions:  (0)   Answers both questions correctly    LOC commands:  (0)   Performs both tasks correctly    Best gaze:  (0)   Normal    Visual:  (1)   Partial hemianopia    Facial palsy:  (0)   Normal symmetrical movements    Motor arm (left):  (4)   No movement -> Baseline    Motor arm (right):  (0)   No drift    Motor leg (left):  (4)   No movement -> Baseline    Motor leg (right):  (3)   No effort against gravity    Limb ataxia:  (0)   Absent    Sensory:  (1)   Mild to moderate sensory loss    Best language:  (0)   Normal- no aphasia    Dysarthria:  (1)   Mild to moderate dysarthria    Extinction and inattention:  (0)   No abnormality        NIHSS Total Score:  14        Dysphagia Screen  Time of screenin/10/2018 1200  Screening results: Per Nursing     ASSESSMENT & RECOMMENDATIONS     The patient was seen for evaluation of altered mental status and CT head showing subacute left occipital lesion in the distribution of left PCA likely representing ischemic stroke. Pt presented to ED for headache on  and had normal CT head at 1900, giving the onset of ischemia based on imaging likely after  1900 and before  1100. Neuro examination showed new right homonomous superior quadrantanopia and chronic left sided hemiaparesis. Given LVAD and ICD, cannot undergo MR imaging, so CTA best option for vessel imaging despite borderline estimated GFR of 40. Plan is to proceed with stroke  workup.    Impression: Subacute ischemic left occipital stroke of unknown etiology    Recommendations:  Subacute ischemic stroke without tPA  - Neuro checks q2 hour  - BP goal < 160/90  - Euthermia, euglycemia  - Continue warfarin  - Cardiac monitoring  - CTA  - TTE to evaluate LV thrombus possibility  - Lipid Profile & HA1c  - PT/OT/SLP  - rest of medical management is per the primary team   - Will follow.    The patient will be managed by the cardiology team and  followed by the Stroke Consult service.    SANGEETHA Rendon is a 68 year old male with PMHx of acute right MCA stroke likely 2/2 LVAD thrombus with persistent left sided deficits, LVAD on warfarin, ICD, CAD s/p 5 vessel bypass 2001, HTN, CHF, had a mitral valve repair in 2002, ICD implantation 2011, and LVAD placement in 2012,HLD, and CKD, right MCA stroke in 2013 which has left him with left-sided hemiparesis presenting to the ED with altered mental status. Per EMS, last well known 11 pm 5/9/18. Family noted increased confusion, visual hallucinations (stated walls were changing colors), and delayed response. Difficulty swallowing when home nurse arrived 2 am this morning. Patient difficult historian due to confusion, but able to state name, place, and month. Endorses pain but unable to point to location. History from daughter he was at his baseline 5/9 when he went to sleep but when he wake up he was feverish & start hallucination by seeing man in the room while there was no body over there.    Last well known as 11 pm 5/9/18. Per chart review, INR has been generally therapeutic to supra therapeutic on warfarin with goal INR of 1.8 to 2.5 during past month, but borderline subtherapeutic at 1.79 and 1.8 on 5/7 and 1.6 on 4/24. Was seen in ED on 5/7/18 for headache and FTH normal head CT and positive UA growing VRE on culture.     Pertinent Past Medical/Surgical History  Past Medical History:   Diagnosis Date     Acute right MCA stroke (H) 6/2013    With  L sided hemiparesis      Anemia of chronic disease     Baseline Hb 8-9     BPH (benign prostatic hyperplasia)      CHF (congestive heart failure), NYHA class IV (H) 10/9/2012    s/p HeartMate II.  Was on milrinone and dobutamine prior to LVAD      CKD (chronic kidney disease)     Baseline Cr=     Clostridium difficile colitis 12/2012      Dysphagia     PEG tube placed in 2012.  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  Normal carotid duplex 3/2014.       Esophagitis 12/2012    EGD with esophagitis and multiple douenal ulcers     Fracture of femoral neck, right, closed (H) 2/3/2015    Presumed pathologic as patient is non-weight-bearing and suffered no trauma.  Family declined operative repair during hospital stay 1/23 - 1/30/15.     Gastric and duodenal angiodysplasia with hemorrhage 6/18/2015     GERD (gastroesophageal reflux disease)      Gout      Hematuria      HTN (hypertension)     LDL=59 (3/29/2014)     Hyperlipaemia      Ischemic cardiomyopathy      Mitral regurgitation     s/p MVR with Carbomedics ring      Myocardial infarction 1998    In [facility] without intervention      Nonsenile cataract     BE     PFO (patent foramen ovale)     s/p closure (10/9/2012)     TB lung, latent     s/p 9 months INH in 2012        Past Surgical History:   Procedure Laterality Date     C CABG, VEIN, FIVE  2001     CATARACT IOL, RT/LT Right 11/19/2015     ENDOSCOPIC RETROGRADE CHOLANGIOPANCREATOGRAM N/A 5/9/2017    Procedure: COMBINED ENDOSCOPIC RETROGRADE CHOLANGIOPANCREATOGRAPHY, PLACE TUBE/STENT;  Endoscopic Retrograde Cholangiopancreatogram, Stent (Zimmon Biliary 7fr 12cm) Placement;  Surgeon: Cristo Grove MD;  [facility]     ESOPHAGOSCOPY, GASTROSCOPY, DUODENOSCOPY (EGD), COMBINED N/A 6/10/2015    Procedure: COMBINED ESOPHAGOSCOPY, GASTROSCOPY, DUODENOSCOPY (EGD);  Surgeon: Edu Jnekins MD;  [facility]      ESOPHAGOSCOPY, GASTROSCOPY, DUODENOSCOPY (EGD), COMBINED N/A 6/15/2015    Procedure: COMBINED ESOPHAGOSCOPY, GASTROSCOPY, DUODENOSCOPY (EGD);  Surgeon: Yury Bonner MD;  [facility]     H INSERT ICD  2/10/2011    And pacemaker.  Not BiV     INSERT VENTRICULAR ASSIST DEVICE LEFT (HEARTMATE II)  10/9/2012     IR GASTRO JEJUNOSTOMY TUBE PLACEMENT       PHACOEMULSIFICATION CLEAR CORNEA WITH STANDARD INTRAOCULAR LENS IMPLANT Right 11/19/2015    Procedure: PHACOEMULSIFICATION CLEAR CORNEA WITH STANDARD INTRAOCULAR LENS IMPLANT;  Surgeon: Violeta Cosme MD;  [facility]     REPAIR PATENT FORAMEN OVALE  10/9/2012     REPAIR VALVE MITRAL  2/9/2012    28 mm Carbomedics 2/3 ring        Medications:  Current Facility-Administered Medications   Medication     linezolid (ZYVOX) infusion 600 mg     Current Outpatient Prescriptions   Medication     ALEK CONTOUR test strip     blood glucose monitoring (ALEK MICROLET) lancets     blood glucose monitoring (NO BRAND SPECIFIED) lancets     finasteride (PROSCAR) 5 MG tablet     furosemide (LASIX) 20 MG tablet     levETIRAcetam (KEPPRA) 750 MG tablet     loratadine (CLARITIN) 10 MG tablet     losartan (COZAAR) 25 MG tablet     MELATONIN PO     Metoprolol Succinate (TOPROL XL PO)     ORDER FOR DME     PANTOPRAZOLE SODIUM PO     senna-docusate (SENEXON-S) 8.6-50 MG per tablet     simethicone (MYLICON) 80 MG chewable tablet     tamsulosin (FLOMAX) 0.4 MG capsule     Thiamine HCl (VITAMIN B-1) 100 MG TABS     acetaminophen (TYLENOL) 325 MG tablet     bisacodyl (DULCOLAX) 10 MG Suppository     ipratropium - albuterol 0.5 mg/2.5 mg/3 mL (DUONEB) 0.5-2.5 (3) MG/3ML neb solution     ketotifen (ZADITOR/REFRESH ANTI-ITCH) 0.025 % SOLN ophthalmic solution     nitroglycerin (NITROSTAT) 0.4 MG SL tablet     nystatin (MYCOSTATIN) 855410 UNIT/GM POWD     order for DME     order for DME     order for DME     order for DME     order for DME     order for DME     order for DME      order for DME     oxyCODONE (ROXICODONE) 5 MG IR tablet     warfarin (COUMADIN) 1 MG tablet     Allergies:   Allergies   Allergen Reactions     Seasonal Allergies    .    Family History:   Family History   Problem Relation Age of Onset     Family History Negative No family hx of      Glaucoma No family hx of      Macular Degeneration No family hx of      CANCER No family hx of      no skin cancer   .    Social History:   Social History   Substance Use Topics     Smoking status: Former Smoker     Smokeless tobacco: Former User     Alcohol use No     ROS:  Review of systems not obtained due to patient factors - confusion    PHYSICAL EXAMINATION  Vital Signs:  B/P: 96/78,  T: 97.2,  P: Data Unavailable,  R: 16    General:  patient lying in bed without any acute distress    HEENT:  normocephalic/atraumatic. Anicteric, no conjunctival injection.   Cardio:  RRR  Pulmonary:  no respiratory distress  Abdomen:  Soft, not painful, has bowel sounds  Extremities:  wwp  Skin:  intact, warm/dry     Neurologic  Mental Status:  Oriented to person, place, and month, but inconsistent attention to commands.  Cranial Nerves: Right homonomous superior quadrantanopia. EOMI. PERRLA. V1,2,3 sensation intact bilaterally. Facial movements intact. Symmetric protrusion of tongue. Unable to visualize soft palate.  Motor:  Strength 0/5 in left sided  strength, biceps, and hip flexion and 5/5 at right  strength, biceps, triceps, and hip flexion 2/5 in Rt LL..  Reflexes:  1 in both UL & LL. LT Upgowing toe.  Sensory:  Sensation to light touch intact in RUE and RLE but absent in LUE and LLE.  Coordination:  Intact in Right UL, cannot be assessed in RT LL, LT UL & LL  Station/Gait:  unable to test    Labs  Labs and Imaging reviewed and used in developing the plan; pertinent results included.     Lab Results   Component Value Date     (H) 05/10/2018     The patient was discussed with the Fellow, Dr. Garcia.  The staff is   Neym[facility].    Greg Benavides on 5/10/2018 at 2:08 PM.sb13    Karly Garcia  Neurocritical care Fellow  Pager [phone]

## 2018-05-11 NOTE — PROGRESS NOTES
[facility]  Patient is currently open to home care services with [facility].  The patient is currently receiving Palliative RN services.  [facility]  and team have been notified of patient admission.  [facility] liaison will continue to follow patient during stay.  If appropriate provide orders to resume home care at time of discharge.    Thank you  Melissa Sifuentes RN, BSN  [facility] Liaison  [phone]

## 2018-05-11 NOTE — PLAN OF CARE
Problem: Patient Care Overview  Goal: Plan of Care/Patient Progress Review  RN:  1.Pt will remain hemodynamically stable.    AVSS.  VAD WNL.  Dressing change done.  Neuros per baseline.  Lactic recheck 2.5.  Pt received 250cc fluid bolus over 2hrs per order.   Tylenol X1 for generalized discomfort.  Tolerating po.  Family at bedside pleasant and very supportive insisting on involvement in pt's hygiene, skin cares, repositioning.  Paced rhythm.  Continue to follow neuro status/imaging.  Turn Q2 hr/maintain skin integrity.  PT/OT per orders.   ordered for 0900.  Notify MD w/ any change in status.

## 2018-05-11 NOTE — PROGRESS NOTES
[facility] Care Coordination Contact  CM notified client was admitted to the hospital on 05/10/18 with confusion and hallucinations. CM spoke with JOHN Quezada at the hospital. She is familiar with client and is aware of his services. CM also notified client's PCA agency of his admission to the hospital.   Nena Salazar RN  [facility]   [phone]

## 2018-05-11 NOTE — PROGRESS NOTES
05/11/18 1125   Quick Adds   Type of Visit Initial Occupational Therapy Evaluation   Living Environment   Lives With child(roberto), adult   Living Arrangements apartment   Home Accessibility no concerns   Number of Stairs to Enter Home 0   Number of Stairs Within Home 0   Living Environment Comment Pt has a manual w/c. Family lifts him for transfers though they do have a colleen. Uses a shower chair and elevator bed.    Self-Care   Dominant Hand right   Usual Activity Tolerance fair   Current Activity Tolerance fair   Regular Exercise (daily PROM by family)   Equipment Currently Used at Home grab bar;wheelchair, manual;hospital bed   Functional Level Prior   Ambulation 4-->completely dependent   Transferring 4-->completely dependent   Toileting 4-->completely dependent   Bathing 4-->completely dependent   Dressing 4-->completely dependent   Eating 2-->assistive person   Communication 0-->understands/communicates without difficulty   Swallowing 0-->swallows foods/liquids without difficulty   Cognition 1 - attention or memory deficits   Fall history within last six months no   Prior Functional Level Comment Pt dependent for ADLs, family does assist with helping pt eat as pt gets fatigued with self feeding   General Information   Onset of Illness/Injury or Date of Surgery - Date 05/10/18   Referring Physician Volodymyr Champion MD   Patient/Family Goals Statement Pt's daughter wants to bring pt home once he is medically stable   Additional Occupational Profile Info/Pertinent History of Current Problem Sohan Rendon is a 68 year old male with h/o ICM s/p LVAD HM2 DT (2012) on warfarin, s/p MVR, multiple ischemic CVAs w/ residual left-sided weakness (immobile at baseline), latent TB, HTN, HLD, PFO s/p closure in 2012, CKDIII, BPH, GERD, gout who presents to the ED today for confusion.    Precautions/Limitations fall precautions   Cognitive Status Examination   Orientation orientation to person, place and time   Level  of Consciousness lethargic/somnolent   Able to Follow Commands WNL/WFL  (following 1 step commands)   Cognitive Comment Daughter reports that pt continues to be off and on confused. Pt oriented to month and hospital. Per MD visiting during eval, pt has UTI and acute CVA.    Visual Perception   Visual Acuity acute blurriness in both eyes, able to identify how many fingers are being held up    Visual Field appears WNL   Visual Attention appears WNL   Occulomotor appears WNL   Visual Perception Comments Plan to follow up with opthamaology. Would not recommend eye patch at this time.    Sensory Examination   Sensory Comments Pt denies n/t   Pain Assessment   Patient Currently in Pain No   Integumentary/Edema   Integumentary/Edema no deficits were identifed   Range of Motion (ROM)   ROM Comment R UE is WFL, no active ROM in L UE/L LE following old CVA   Strength   Strength Comments R UE grossly 3+/5   Instrumental Activities of Daily Living (IADL)   IADL Comments Daughter assist with all ADLs   Activities of Daily Living Analysis   Impairments Contributing to Impaired Activities of Daily Living cognition impaired   General Therapy Interventions   Planned Therapy Interventions ADL retraining;transfer training;visual perception  (education)   Clinical Impression   Criteria for Skilled Therapeutic Interventions Met yes, treatment indicated   OT Diagnosis Decreased ADL I with blurry vision and decreased ROM on L side following stroke.    Influenced by the following impairments Cognition, vision   Assessment of Occupational Performance 1-3 Performance Deficits   Identified Performance Deficits vision   Clinical Decision Making (Complexity) Low complexity   Therapy Frequency (1x eval and tx)   Predicted Duration of Therapy Intervention (days/wks) 1 day eval and tx   Anticipated Discharge Disposition Home with Assist   Risks and Benefits of Treatment have been explained. Yes   Patient, Family & other staff in agreement with  "plan of care Yes   Clinical Impression Comments Pt to benefit from skilled OT to address above deficits and facilitate safe d/c home.    [facility] AM-PAC  \"6 Clicks\" Daily Activity Inpatient Short Form   1. Putting on and taking off regular lower body clothing? 1 - Total   2. Bathing (including washing, rinsing, drying)? 1 - Total   3. Toileting, which includes using toilet, bedpan or urinal? 1 - Total   4. Putting on and taking off regular upper body clothing? 2 - A Lot   5. Taking care of personal grooming such as brushing teeth? 1 - Total   6. Eating meals? 2 - A Lot   Daily Activity Raw Score (Score out of 24.Lower scores equate to lower levels of function) 8   Total Evaluation Time   Total Evaluation Time (Minutes) 8     "

## 2018-05-11 NOTE — PROGRESS NOTES
05/11/18 1458   General Information   Onset Date 05/10/18   Start of Care Date 05/11/18   Referring Physician Volodymyr Champion MD   Patient Profile Review/OT: Additional Occupational Profile Info See Profile for full history and prior level of function   Patient/Family Goals Statement Pt did not state   Swallowing Evaluation Bedside swallow evaluation   Behaviorial Observations WFL (within functional limits)   Mode of current nutrition Oral diet   Type of oral diet Regular;Thin liquid  (2g sodium diet)   Respiratory Status Room air   Comments Clinical swallow eval completed per MD orders. Sohan Rendon is a 68 year old male with h/o ICM s/p LVAD HM2 DT (2012) on warfarin, s/p MVR, multiple ischemic CVAs w/ residual left-sided weakness (immobile at baseline), latent TB, HTN, HLD, PFO s/p closure in 2012, CKDIII, BPH, GERD, gout who presents to the ED today for confusion. Pt known to this service from prior CVA in 2014; pt was discharged dysphagia diet level 3 with thin liquids at that time. Per discussion with pt and daughter, pt was eating regular diet at home.   Clinical Swallow Evaluation   Oral Musculature generally intact   Structural Abnormalities none present   Dentition present and adequate   Mucosal Quality good   Mandibular Strength and Mobility intact   Oral Labial Strength and Mobility WFL   Lingual Strength and Mobility WFL   Velar Elevation intact   Buccal Strength and Mobility intact   Laryngeal Function Throat clear;Cough;Swallow;Voicing initiated;Dry swallow palpated   Clinical Swallow Eval: Thin Liquid Texture Trial   Mode of Presentation, Thin Liquids cup;spoon;straw;fed by clinician   Volume of Liquid or Food Presented 1/2 tsp x2; cup sips x3; 2 oz via straw   Oral Phase of Swallow WFL   Pharyngeal Phase of Swallow intact   Diagnostic Statement No overt s/sx of aspiration with thin liquids   Clinical Swallow Eval: Puree Solid Texture Trial   Mode of Presentation, Puree spoon;fed by  clinician   Volume of Puree Presented 1/2 tsp x2   Oral Phase, Puree WFL   Pharyngeal Phase, Puree intact   Diagnostic Statement No overt s/sx of aspiration with puree textures   Clinical Swallow Eval: Semisolid Texture Trial   Mode of Presentation, Semisolid spoon;fed by clinician   Volume of Semisolid Food Presented 1/2 tsp x2   Oral Phase, Semisolid WFL   Pharyngeal Phase, Semisolid intact   Diagnostic Statement No overt s/sx of aspiration with semisolid textures   Clinical Swallow Eval: Solid Food Texture Trial   Mode of Presentation, Solid self-fed   Volume of Solid Food Presented 1/4 swetha cracker x3   Oral Phase, Solid Residue in oral cavity  (Minimal residue on tongue, cleared with liquid wash)   Pharyngeal Phase, Solid intact   Diagnostic Statement No overt s/sx of aspiration with solid textures; minimal oral residue, cleared independently with sip of liquid   Swallow Compensations   Swallow Compensations No compensations were used   Esophageal Phase of Swallow   Patient reports or presents with symptoms of esophageal dysphagia No   Swallow Eval: Clinical Impressions   Skilled Criteria for Therapy Intervention No problems identified which require skilled intervention   Functional Assessment Scale (FAS) 7   Treatment Diagnosis Functional oropharyngeal swallow   Diet texture recommendations Regular diet;Thin liquids   Recommended Feeding/Eating Techniques maintain upright posture during/after eating for 30 mins;small sips/bites;alternate between small bites and sips of food/liquid   Demonstrates Need for Referral to Another Service occupational therapy;physical therapy   Predicted Duration of Therapy Intervention (days/wks) Evaluation only   Anticipated Discharge Disposition (Defer to PT/OT)   Risks and Benefits of Treatment have been explained. Yes   Patient, family and/or staff in agreement with Plan of Care Yes   Clinical Impression Comments Clinical swallow evaluation completed per MD orders. Pt presents  with functional oropharyngeal swallow. Pt with adequate mastication/bolus manipulation; minimal oral residue on tongue after solid trials, which pt cleared independently with a sip of liquid. No overt s/sx of aspiration with PO trials. Pt with hoarse voice, which pt and daughter report is baseline. Recommend continue regular diet with thin liquids. Pt to sit upright, take small sips, and pace self with PO intake. Pt reports no difficulty with comprehension or expression.    Total Evaluation Time   Total Evaluation Time (Minutes) 25

## 2018-05-11 NOTE — PROGRESS NOTES
Care Coordinator Progress Note    Admission Date/Time:  5/10/2018  Attending MD:  Spencer Phipps    Data  Chart reviewed, discussed with interdisciplinary team.   Patient was admitted for: Sepsis due to urinary tract infection (H).  Assessment  Concerns with insurance coverage for discharge needs: None.  Current Living Situation: Patient lives with family.  Support System: Supportive and Involved family  Services Involved: [facility] Monitoring, PCA ([facility]- 11hrs/day, [facility]  Transportation at Discharge: [employer] Stretcher Ride per family request  Transportation to Medical Appointments: Pt's family states they use medical rides  Barriers to Discharge  Medical plan of care, chronic illness    Coordination of Care and Referrals: This writer met with pt and pt's daughter Donn at bedside to discuss discharge planning. Donn stated that pt currently receives skilled nursing visits through  Home Care that they would like to resume upon discharge. Donn also confirmed that pt gets 11 hours/day of PCA services.  This writer received a call from Nena Salazar, pt's [place] Partner's , Nena Salazar (Ph: [phone]) for update.  Will need [email or web address] Stretcher ride arranged once discharge date known.  Intervention  Orders placed for [facility] (Ph: [phone]) for resumption of palliative home care program. RN evaluation post hospitalization. Assess vital signs, respiratory and cardiac status, activity tolerance, hydration, nutritional status, med setup and management. INR lab draws with results to [facility] Monitoring.     Plan  Anticipated Discharge Date: TBD  Anticipated Discharge Plan: Discharge to home with resumption of previous home services.  CC will continue to monitor patient's medical condition and progress towards discharge.  Joanna Roberts RN BSN  6C Unit Care Coordinator  Phone number: [phone]  Pager: [phone]    Addendum 5/11/2018 at 1515:  Per  Cards 2 MD, pt likely ready for discharge to home tomorrow. [facility] for Saturday, 5/12/2018 at 1500.

## 2018-05-11 NOTE — PROGRESS NOTES
SPIRITUAL HEALTH SERVICES  SPIRITUAL ASSESSMENT Progress Note  [facility]C     REFERRAL SOURCE: brief check-in with family member, per request for  support.    Chaplain Imam Germán Kunz has visited pt during previous hospitalizations. I let family know that I would pass on a referral to Chaplain Dunlap - family said they will look forward to visit.    PLAN: refer to Chaplain Germán Kunz for visit.    Supa Stewart M.Div (Bill)., [facility]  Staff   Pager [phone]

## 2018-05-11 NOTE — PLAN OF CARE
Problem: Patient Care Overview  Goal: Plan of Care/Patient Progress Review  RN:  1.Pt will remain hemodynamically stable.     PT 6C: PT orders received. Per chart review and discussion with OT, pt at his functional baseline with no acute PT needs identified. Pt has full cares at home, dependent for transfers, family IND with ROM program. PT orders will be completed.

## 2018-05-11 NOTE — PLAN OF CARE
Problem: Patient Care Overview  Goal: Plan of Care/Patient Progress Review  RN:  1.Pt will remain hemodynamically stable.     Discharge Planner OT   Patient plan for discharge: daughter states home after medically stable.   Current status: Pt at functional baseline. Daughter educated on use of ceiling lift, currently does not use -- pt has been dependent A x 2 for transfers from children. Vision assessed, and pt with decreased acuity though able to identify numbers accurately. Oriented x 3, though daughter states cognition waxes and wanes. Per MD with acute UTI likely contributing to confusion.   Barriers to return to prior living situation: medical status.   Recommendations for discharge: home with assist, follow up with eye doctor.   Rationale for recommendations: at functional baseline, no further acute OT needs.       Entered by: Staci Forrest 05/11/2018 12:23 PM     OT 6C    Occupational Therapy Discharge Summary    Reason for therapy discharge:    All goals and outcomes met, no further needs identified.    Progress towards therapy goal(s). See goals on Care Plan in [facility] electronic health record for goal details.  Goals met    Therapy recommendation(s):    Continue home exercise program.

## 2018-05-11 NOTE — PHARMACY-ANTICOAGULATION SERVICE
Clinical Pharmacy - Warfarin Dosing Consult     Pharmacy has been consulted to manage this patient s warfarin therapy.  Indication: LVAD/RVAD  Therapy Goal:  (1.8-2.5)  Warfarin Prior to Admission: Yes  Warfarin PTA Regimen: no maintenance plan but clinic instructed pt to take 3 mg today    INR   Date Value Ref Range Status   05/10/2018 2.11 (H) 0.86 - 1.14 Final   05/10/2018 1.9  Final     Chromogenic Factor 10   Date Value Ref Range Status   08/10/2014 24 (L) 70 - 130 % Final     Comment:     Therapeutic Range:  A Chromogenic Factor 10 level of approximately 20-40%   inversely correlates with an INR of 2-3 for patients receiving Warfarin.   Chromogenic Factor 10 levels below 20% indicate an INR greater than 3 and   levels above 40% indicate an INR less than 2.       Factor 2 Assay   Date Value Ref Range Status   07/21/2014 25 (L) 60 - 140 % Final     Comment:     Analyte Specific Reagents (ASRs) are used in many laboratory tests necessary   for   standard medical care and generally do not require FDA approval.  This test   was   developed and its performance characteristics determined by [facility].  It has not been cleared or approved by   the US Food and Drug Administration.         Recommend warfarin 3 mg today.  Pharmacy will monitor oShan Rendon daily and order warfarin doses to achieve specified goal.      Please contact pharmacy as soon as possible if the warfarin needs to be held for a procedure or if the warfarin goals change.

## 2018-05-11 NOTE — PLAN OF CARE
Problem: Patient Care Overview  Goal: Plan of Care/Patient Progress Review  Outcome: No Change  Admission          5/10/2018 10:36 AM  -----------------------------------------------------------  Diagnosis:  stroke    Admitted from:  ED  Report given from:  ED hand-off  Via: stretcher  Accompanied by: family  Belongings: Placed in closet; valuables sent home with family  Admission Profile: in progress  Teaching: orientation to unit, call don't fall, use of console, meal times, visiting hours,  when to call for the RN (angina/sob/dizzyness, etc.), and enforced importance of safety   Access: PIV  Telemetry:Placed on pt    Temp:  [97.2  F (36.2  C)-97.9  F (36.6  C)] 97.9  F (36.6  C)  Heart Rate:  [58-95] 61  Resp:  [6-17] 9  BP: ()/() 108/98  SpO2:  [88 %-100 %] 94 %

## 2018-05-11 NOTE — PROGRESS NOTES
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     # AMS  # Hx CVAs with residual left-sided hemiparesis   # New Subacute CVA   Presented with confusion for one day, improved on own in ED. CT head notable for new CVA in left occipital lobe. Discussed with Neurology, CVA appears to be subacute and ischemic with no evidence of hemorrhage. Prior CVA related to an LV thrombus, repeat echo without definitive thrombus  - Neurology recommended CTA Neck, but given patient's CKD and low GFR, will hold off for now  - Speech/PT/OT consults  - restarting asa, statin declined by patient previously     # VRE UTI  UCx from 5/7 growing VRE, reports pressure with urination. Part of AMS could be from UTI. Will continue linezolid for 7 day course as complicated UTI     # ICM s/p HM II LVAD  Hx of recurrent device thrombi and multiple embolic CVAs. Per Neuro, CVA this time appears to be ischemic  - ACEi/ARB: losartan 25mg daily  - BB: Metoprolol 50mg AM  - Aldosterone antagonist: none  - Anticoagulation: warfarin, goal 1.8 - 2.5    - Antiplatelet: restarting asa  - Statin: d/c recently per pt wanting to reduce his meds      Chronic Problems:   # CKD Stage III: Cr at baseline ~1.7-2.1, 2.01 on admission, will continue to monitor  # HTN: continue PTA medications as above  # Chronic Normocytic Anemia: no active signs of bleeding, will continue to monitor  # Seizures s/p Multiple CVAs: continue keppra 750 mg BID, pt wants IV b/c he can't swallow the big pill   # BPH: continue PTA tamsulosin and finasteride  # HLD: d/c atorvastatin recently   # Gout: no signs of active flare, allopurinol d/c recently to limit his meds   #  GERD: continue PTA pantoprazole  # Insomnia: continue PTA melatonin  # Chronic Pain Syndrome: continue PTA oxycodone PRN     Access: PIV  Diet/IVF: 2g Na diet, 2L fluid restriction, 250 cc bolus   DVT ppx: warfarin   Disposition/Admission Status: cards2.   CODE: Full     Sukhjinder Zeng MD PGY4  Cardiology Fellow  [phone]    Patient was seen by and the above plan discussed with Dr. Butler.         Subjective:     [place]  used.  Family here at bedside  Patient denies current chest pain, dyspnea on exertion, orthopnea, PND, lightheadedness, or palpitations.   Not having double vision.          Medications:       finasteride  5 mg Oral Daily     ketotifen  1 drop Both Eyes Daily     levETIRAcetam  750 mg Intravenous Q12H     linezolid  600 mg Intravenous Q12H     loratadine  10 mg Oral Daily     losartan  25 mg Oral Daily     melatonin tablet 5 mg  5 mg Oral At Bedtime     metoprolol succinate (TOPROL-XL) 24 hr tablet 50 mg  50 mg Oral QAM     pantoprazole (PROTONIX) EC tablet 20 mg  20 mg Oral QAM AC     tamsulosin  0.8 mg Oral Daily     thiamine  100 mg Oral Daily       Warfarin Therapy Reminder            Objective:          Physical Exam:   Temp:  [97.2  F (36.2  C)-98.5  F (36.9  C)] 97.8  F (36.6  C)  Heart Rate:  [58-95] 58  Resp:  [6-18] 18  BP: ()/() 91/79  SpO2:  [88 %-100 %] 100 %  I/O last 3 completed shifts:  In: 835 [P.O.:60; I.V.:400; IV Piggyback:375]  Out: 300 [Urine:300]    Vitals:    05/10/18 1039   Weight: 89.2 kg (196 lb 10.4 oz)     GEN:  Alert, interactive, appears comfortable, NAD.  CV:  LVAD hum  LUNGS:  On room air, normal work of breathing, Clear to auscultation bilaterally, no wheezes or crackles.   ABD:  Active bowel sounds, soft, non-tender/non-distended.  No rebound/guarding/rigidity.  EXT:  No edema or cyanosis.  Hands/feet warm to touch with good signs of peripheral perfusion.   SKIN:  Dry to touch, no exanthems noted in the visualized areas.  NEURO:   Oriented to person and place, thought it was night time, cranial nerves intact, able to move R arm and leg         Data:   BMP  Recent Labs  Lab 05/11/18  0551 05/10/18  1045 05/07/18  1700 05/04/18  1322    137 139 140   POTASSIUM 4.3 4.2 4.0 4.1   CHLORIDE 110* 108 111* 112*   JOSÉ 8.2* 8.0* 8.4* 8.1*   CO2 20 19* 22 20   BUN 23 24 20 20   CR 2.05* 2.01* 2.13* 2.06*   * 175* 150* 161*     LFTs  Recent Labs  Lab 05/10/18  1045 05/04/18  1322   ALKPHOS 95 93   AST 43 59*   ALT 16 16   BILITOTAL 0.5 0.6   PROTTOTAL 7.4 7.4   ALBUMIN 3.1* 3.2*      CBC  Recent Labs  Lab 05/11/18  0551 05/10/18  1045 05/07/18  1700 05/04/18  1322   WBC 6.6 7.4 7.6 7.0   RBC 3.28* 3.50* 3.43* 3.30*   HGB 9.1* 9.8* 9.7* 9.5*   HCT 28.9* 31.8* 31.2* 30.6*   MCV 88 91 91 93   MCH 27.7 28.0 28.3 28.8   MCHC 31.5 30.8* 31.1* 31.0*   RDW 21.7* 22.2* 22.6* 22.2*    210 177 190     INR  Recent Labs  Lab 05/11/18  0551 05/10/18  1045 05/10/18 05/07/18  1700   INR 2.43* 2.11* 1.9 1.79*

## 2018-05-12 NOTE — PLAN OF CARE
Problem: Patient Care Overview  Goal: Plan of Care/Patient Progress Review  RN:  1.Pt will remain hemodynamically stable.     D: Pt admitted 5/10 for altered mental status. History of HM II (2012) and multiple CVA's with left sided deficits. On IV Linezolid for UTI.   I: Monitored/assessed pt. Assisted with cares.  A: Pt VS stable and comfortable with turning Q2. LVAD values WNL, no alarms noted. Flow ---. Family takes care of pt at home and someone is with him always at the hospital.  P: Continue to monitor/assess pt, contact provider with concerns.

## 2018-05-12 NOTE — PLAN OF CARE
Problem: Patient Care Overview  Goal: Plan of Care/Patient Progress Review  RN:  1.Pt will remain hemodynamically stable.     Patient admitted 5/10 for altered mental status. History of HM II (2012) and multiple CVA's with left sided deficits. LVAD numbers WNL, no alarms noted. Flow ---. On Zyvox for UTI. Family very involved in patient care. Anticipate [facility] home tomorrow, [facility]. Continue to assess and monitor, notify Cards 2 with concerns.

## 2018-05-12 NOTE — DISCHARGE SUMMARY
DISCHARGE 5/12/18 1500  Discharged to: Home  Via: [facility] stretcher  Accompanied by: Daughter   Discharge Instructions: diet, activity, medications, follow up appointments, when to call the MD, and what to watch out for (i.e. s/s of infection, increasing SOB, palpitations, chest pain)  Prescriptions: To be filled by discharge pharmacy per patient's request; medication list reviewed & sent with patient  Follow Up Appointments: arranged; information given  Belongings: All sent with patient  IV: out  Telemetry: off  Patient exhibits understanding of above discharge instructions; all questions answered.  Discharge Paperwork: faxed

## 2018-05-12 NOTE — DISCHARGE SUMMARY
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      Patient is accompanied by his daughter. History is obtained from both. Patient was last seen normal at 11pm day prior to admission. He woke up day of admission with confusion- unable to state where he is or the date. Daughter was concerned that he may have had a new stroke so she brought him into the ED.       On interview, patient's confusion has improved. He knows where he is and what day it is. He responds appropriately to commands. Denies pain. Denies headaches although he did have headache a few days ago which has since improved with tylenol. Denies fevers, chills, chest pain, SOB, abdominal pain, n/v, constipation or diarrhea. Reports pressure with urination but no burning or urgency. No trouble with swallowing. No new weakness noted- at baseline he is immobile and has decreased strength in LEs and left upper extremity. No weight gain, edema, orthopnea, or PND. Compliant with his home meds.      Patient was seen in ED on 5/7 for headaches. He had a UA drawn at that time which came back positive and the culture came back positive for VRE, sensitive to Linezolid.     Patient was seen by Neurology in ED- CT head concerning for subacute ischemic stroke, no sign of hemorrhage. Admitted to Cards for further workup.              Discharge Diagnosis:   # AMS  # Hx CVAs with residual left-sided hemiparesis   # New Subacute CVA   # VRE UTI  # ICM s/p HM II LVAD  # Seizures s/p Multiple CVAs         Follow Up:   - INR check 5/15  - PCP in 1-2 wks with labs  - Cardiology heart failure clinic in 2-4wks (sent message to LVAD coordinator)         Pending Results:   Urine culture from 5/10 with sensitivities pending          Hospital Course by Problem:      # AMS  # Hx CVAs with residual left-sided hemiparesis   # New Subacute CVA   Presented with confusion for one day, improved on own in ED. CT head notable for new CVA in left occipital lobe. Discussed with Neurology, CVA appears to be subacute and ischemic with no evidence of hemorrhage. Prior CVA related to an LV thrombus, repeat Echo without definitive thrombus. Neurology recommended CTA Neck, but given patient's CKD and low GFR, will hold off.   - Speech evaluated pt, recommended regular diet with thin liquids. Pt to sit upright, take small sips, and pace self with PO intake.   - OT recommended to continue home exercise program.   - Resumed aspirin. Pt previously declined statin to limit amount of meds he takes.      # VRE UTI  UCx from 5/7 grew VRE, reports pressure with urination. Part of AMS could be from UTI.   - Linezolid po (5/10 -> 5/16), received IV from 5/10-5/12.   - BCxs from 5/10 with no growth   - UCx from 5/10 sensitivities pending      # ICM s/p HM II LVAD  Hx of recurrent device thrombi and multiple embolic CVAs. Per Neuro, CVA this time appears to be ischemic.   - ACEi/ARB: losartan 25mg daily  - BB: Metoprolol 50mg AM  - Aldosterone antagonist: none  - Anticoagulation: warfarin, goal 1.8 - 2.5. INR on d/c 2.41, d/c on warfarin 3mg daily with plan for INR check on 5/15.      - Antiplatelet: Aspirin (resumed this admission, pt had previously stopped taking it own due to wanting to limit his meds)  - Statin: d/c recently per pt wanting to reduce his meds     # Seizures s/p  Multiple CVAs  Per family and pt, Keppa pill is too large for him to swallow, changed to liquid this admission.  - Keppra 750mg BID liquid solution        Physical Exam on day of Discharge:  Blood pressure 91/74, temperature 97.8  F (36.6  C), temperature source Oral, resp. rate 16, weight 89.2 kg (196 lb 10.4 oz), SpO2 100 %.  General: NAD, lying in bed comfortably   HEENT: anicteric sclera, MMM, OP unobstructed, w/o erythema/discharge  CV: LVAD hum   Lungs: CTAB, no wheezing/crackles, no cough  Abd: soft, non-distended, non-tender, normoactive bowel sounds   Ext: WWP, no BLE edema  Skin: no rashes, cyanosis, or jaundice on exposed skin  Neuro: Oriented to person and place, able to move right arm and left and right legs.     Lab Studies on Day of Discharge:   Last CBC:   Recent Labs   Lab Test  05/11/18   0551   WBC  6.6   RBC  3.28*   HGB  9.1*   HCT  28.9*   MCV  88   MCH  27.7   MCHC  31.5   RDW  21.7*   PLT  200       Last CMP:  Recent Labs   Lab Test  05/11/18   0551  05/10/18   1045   NA  139  137   POTASSIUM  4.3  4.2   CHLORIDE  110*  108   JOSÉ  8.2*  8.0*   CO2  20  19*   BUN  23  24   CR  2.05*  2.01*   GLC  111*  175*   AST   --   43   ALT   --   16   BILITOTAL   --   0.5   ALBUMIN   --   3.1*   PROTTOTAL   --   7.4   ALKPHOS   --   95            Procedures & Significant Findings:   CT Head 5/10:  Impression:  1. There is a new late or subacute infarction in the left occipital  lobe, in the distribution of the left PCA, without any associated  atrophy. There is no evidence of hemorrhage or mass effect. MRI could  be obtained to further evaluate the age of this new infarction.  2. Numerous old infarcts within multiple vascular territories, the  largest of which is in the distribution of the right MCA, as  previously seen on the CT on 5/7/2018.           Consultations:   - Neurology: see consult note from 5/10         Discharge Medications:     Discharge Medication List as of 5/12/2018  2:10 PM      START  taking these medications    Details   aspirin 81 MG tablet Take 1 tablet (81 mg) by mouth daily, Disp-30 tablet, R-3, E-Prescribe      levETIRAcetam (KEPPRA) 100 MG/ML solution Take 7.5 mLs (750 mg) by mouth every 12 hours, Disp-500 mL, R-3, E-Prescribe      linezolid (ZYVOX) 600 MG tablet Take 1 tablet (600 mg) by mouth 2 times daily for 4 days, Disp-9 tablet, R-0, E-Prescribe         CONTINUE these medications which have CHANGED    Details   warfarin (COUMADIN) 1 MG tablet Take 3 tablets (3 mg) by mouth daily, Disp-30 tablet, R-1, E-Prescribe         CONTINUE these medications which have NOT CHANGED    Details   acetaminophen (TYLENOL) 325 MG tablet Take 2 tablets (650 mg) by mouth every 6 hours as needed for mild pain, Disp-100 tablet, R-0, Historical      ALEK CONTOUR test strip USE TO TEST BLOOD SUGAR 2 TIMES DAILY OR AS DIRECTED., Disp-100 strip, R-2, E-Prescribe      bisacodyl (DULCOLAX) 10 MG Suppository Place 1 suppository (10 mg) rectally daily as needed for constipation, Disp-5 suppository, R-1, E-Prescribe      blood glucose monitoring (ALEK MICROLET) lancets USE TO TEST BLOOD SUGAR 2 TIMES DAILY OR AS DIRECTED, Disp-100 each, R-2, E-PrescribeWAO      blood glucose monitoring (NO BRAND SPECIFIED) lancets Use to test blood sugars 2 times daily or as directed.Disp-1 Box, [id number]      finasteride (PROSCAR) 5 MG tablet Take 1 tablet (5 mg) by mouth daily, Disp-90 tablet, R-3, E-Prescribe      furosemide (LASIX) 20 MG tablet Take 1 tablet (20 mg) by mouth as needed, Disp-20 tablet, R-11, E-Prescribe      ipratropium - albuterol 0.5 mg/2.5 mg/3 mL (DUONEB) 0.5-2.5 (3) MG/3ML neb solution Take 1 vial (3 mLs) by nebulization every 6 hours as needed for shortness of breath / dyspnea, wheezing or other, Disp-360 mL, R-11, E-Prescribe      ketotifen (ZADITOR/REFRESH ANTI-ITCH) 0.025 % SOLN ophthalmic solution Place 1 drop into both eyes 2 times daily, Disp-1 Bottle, R-11, E-PrescribeInstructions in [facility]  if possible      loratadine (CLARITIN) 10 MG tablet TAKE 1 TABLET (10 MG) BY MOUTH DAILY, Disp-90 tablet, R-2, E-Prescribe      losartan (COZAAR) 25 MG tablet Take 1 tablet (25 mg) by mouth daily, Disp-15 tablet, R-3, Historical      MELATONIN PO Take 5 mg by mouth At Bedtime, Historical      Metoprolol Succinate (TOPROL XL PO) Take 50 mg by mouth daily , Historical      !! ORDER FOR DME Equipment being ordered: Lift chair.    Please see indications and face-to-face encounter details in 2/3/15 Office Visit note.Disp-1 Device, R-0, Local Print      oxyCODONE (ROXICODONE) 5 MG IR tablet Take 1 tablet (5 mg) by mouth every 4 hours as needed for moderate to severe pain, Disp-30 tablet, R-0, Local Print      PANTOPRAZOLE SODIUM PO Take 20 mg by mouth every morning (before breakfast), Historical      senna-docusate (SENEXON-S) 8.6-50 MG per tablet Take 2 tablets by mouth 2 times daily as needed for constipation, Disp-60 tablet, R-1, E-Prescribe      simethicone (MYLICON) 80 MG chewable tablet Take 1 tablet (80 mg) by mouth 4 times daily, Disp-180 tablet, R-5, E-Prescribe      tamsulosin (FLOMAX) 0.4 MG capsule Take 2 capsules (0.8 mg) by mouth daily, Disp-60 capsule, R-11, Local Print      Thiamine HCl (VITAMIN B-1) 100 MG TABS TAKE 1 TABLET (100 MG) BY MOUTH DAILY, Disp-90 tablet, R-3, E-Prescribe      nitroglycerin (NITROSTAT) 0.4 MG SL tablet Place 1 tablet (0.4 mg) under the tongue every 5 minutes as needed for chest pain, Disp-30 tablet, R-1, E-Prescribe      !! order for DME Equipment being ordered: Bilateral leg supports for manual wheelchair.Disp-2 each, R-0, Local Print      !! order for DME Equipment being ordered: Reclining manual w/c with bilateral elevating leg rests.Disp-1 each, R-0, Local Print      !! order for DME Equipment being ordered: Hospital Bed, fully electric.Disp-1 each, R-0, Local Print      !! order for DME Equipment being ordered: Wheelchair, manual.Disp-1 each, R-0, Local Print      !! order  for DME Equipment being ordered: Wheelchair, manual, with elevated leg rests and tilt in space back.  Please fax to [facility]; I called them 11/26/16 and they requested the new order.  Face to face is in my 10/26/16 note.Disp-1 each, R-0, Local Print      !! order for DME Equipment being ordered: Cushioned heel boots.Disp-2 each, R-0, Local Print      !! order for DME Bilateral heel protective cushioning boots.  Dx: previous stroke with left hemiplegia.  Duration of need: 99 months.Disp-2 each, R-0, Local Print      !! order for DME Equipment being ordered: NebulizerDisp-1 each, R-11, Local Print       !! - Potential duplicate medications found. Please discuss with provider.      STOP taking these medications       levETIRAcetam (KEPPRA) 750 MG tablet Comments:   Reason for Stopping:                    Discharge Instructions and Follow-Up:     Discharge Procedure Orders  Medication Therapy Management Referral   Referral Type: Med Therapy Management     Home care nursing referral   Referral Type: Home Health Therapies & Aides     Reason for your hospital stay   Order Comments: You were admitted for confusion that is due to your urinary tract infection and new stroke. You improved with antibiotics, please complete full course of antibiotics, total 7 days. You were also restarted on your aspirin, please take this daily. You told us that were having difficulty swallowing the keppra pill so we changed the medication to liquid which you are now tolerating. Your warfarin dose was also changed to 3mg daily, please have INR checked on Tuesday (5/15). Please see the discharge medication list for updated list of medications.     Activity   Order Comments: Your activity upon discharge: activity as tolerated   Order Specific Question Answer Comments   Is discharge order? Yes      Discharge Instructions   Order Comments: Please have your INR checked 5/15.   Please follow up with primary care doctor in next 1-2wks with CBC, BMP,  Mag, INR.  Please follow up with heart failure clinic in 2-4wks     Follow Up and recommended labs and tests   Order Comments: INR check on 5/15.  PCP in 1-2wks with CBC, BMP, Mag, INR  Cardiology heart failure in 2-4wks     Full Code     Diet   Order Comments: Follow this diet upon discharge: Orders Placed This Encounter     Fluid restriction 2000 ML FLUID     2 Gram Sodium Diet   Order Specific Question Answer Comments   Is discharge order? Yes                Discharge Disposition:   Home with family          Condition on Discharge:   Discharge condition: Stable   Code status on discharge: Full Code        Date of service: 5/12/2018    The patient was discussed with Dr. Butler.    60 minutes spent in discharge, including >50% in counseling and coordination of care, medication review and plan of care recommended on follow up. Questions were answered.     It was our pleasure to care for Sohan Rendon during this hospitalization. Please do not hesitate to contact me should there be questions regarding the hospital course or discharge plan.      Joey Champion MD  IM, PGY1

## 2018-05-14 NOTE — PROGRESS NOTES
Patient has LVAD so they will be followed by Cardiology for Post DC follow up              VAD Tracking   Implant date: 10/9/12   Implant Information   Implant: LVAD   LVAD: Heartmate II   Care Team

## 2018-05-14 NOTE — PROGRESS NOTES
[facility] Care Coordination Contact  Received notice that member discharged from the hospital and back to home on Saturday May 12. Called member to see how he was doing and no answer. ML requesting a call back.  Called [facility] at [phone] and no answer. ML letting them know that member discharged back to his home on 5/12/18 so PCA services can resume. Called [facility] and they were aware of the discharge. Transitions log updated.   Yisel Cunha RN, PHN, [facility]   [phone]

## 2018-05-14 NOTE — PROGRESS NOTES
This writer called Pt to check in after he was discharged from the hospital. Pt's daughter had some concerns about the Pt's DLES, tender and no redness and no drainage. We will make a nurse visit on Thursday to assess his DLES. We will also schedule him to see a HF NP in 2-4 weeks.

## 2018-05-14 NOTE — PROGRESS NOTES
Dates of hospitalization: 5/10 to 5/12  Reason for hospitalization: UTI and new stroke  Procedures performed: antibiotics  Vitamin K or FFP administered? no  Inpatient warfarin doses added to calendar? yes  Medication changes at discharge: Restarted ASA 81mg, Keppra changed from pill to liquid, and Zyvox started for sepsis D/T UTI.  Warfarin dosing after DC: 3mg  Patient discharged on Lovenox? no  Next INR date: 5/15  Where is the patient discharging to? (home, TCU, staying locally, etc.): home  Will patient have home care? Yes.  [facility].

## 2018-05-14 NOTE — MR AVS SNAPSHOT
Sohan [facility]   5/14/2018   Anticoagulation Therapy Visit    Description:  68 year old male   Provider:  Dafne Sanders, RN   Department:  [facility]           INR as of 5/14/2018     Today's INR No new INR was available at the time of this encounter.      Anticoagulation Summary as of 5/14/2018     INR goal    Prior goal 1.8-2.5   Today's INR No new INR was available at the time of this encounter.   Full instructions 5/14: 3 mg   Next INR check 5/15/2018    Indications   LVAD (left ventricular assist device) present [Z95.811]  Long-term (current) use of anticoagulants [Z79.01] [Z79.01]         May 2018 Details    Sun Mon Tue Wed Thu Fri Sat       1               2               3               4               5                 6               7               8               9               10               11               12                 13               14      3 mg   See details      15            16               17               18               19                 20               21               22               23               24               25               26                 27               28               29               30               31                  Date Details   05/14 This INR check       Date of next INR:  5/15/2018         How to take your warfarin dose     To take:  3 mg Take 1 of the 3 mg tablets.

## 2018-05-15 NOTE — LETTER
May 15, 2018      Sohan Rendon  [address]   [place] [address]        Dear Sohan,     It was so nice to speak with your daughter on 5/15/2018.  I hope I was able to give you some useful information during our Medication Therapy Management (MTM) visit. The purpose of this visit with a clinical pharmacist was to review the medicines you received when discharged from the hospital. We want to make sure that you know which medicines to take and what they are for. We also want to make sure all your medicines are working, safe, and as easy to take as possible.    Enclosed is a summary of the suggestions we talked about and any other thoughts I had. There is also a list of your medicines included. This information has also been shared with your primary care provider.    Feel free to call if you have any questions or concerns. By working together with you and your doctor, I hope to help you feel confident managing your medicines and improving your quality of life.       Best wishes,            Luna Carranza [facility]

## 2018-05-15 NOTE — PROGRESS NOTES
SUBJECTIVE/OBJECTIVE:                Sohan Rendon is a 68 year old male called for a transitions of care visit.  He was discharged from [facility] on 2018 for  # AMS  # Hx CVAs with residual left-sided hemiparesis   # New Subacute CVA   # VRE UTI  # ICM s/p HM II LVAD  # Seizures s/p Multiple CVAs    Spoke with daughter Antionette who has consent to communicate on file.    Chief Complaint: None.  Phone connection is not very good today, some difficulty hearing over the telephone      Allergies/ADRs: Reviewed in Epic  Tobacco: No tobacco use   Alcohol: not currently using  Caffeine: no caffeine, sometimes tea  PMH: Reviewed in Epic    Medication Adherence/Access:  See from discharge note that patient is trying to decrease number of medications and the reason why he is not on statin.  Patient takes medications directly from bottles.  Medication barriers: none per daughter  The patient fills medications at [place]: YES at discharge.    Overall daughter reports no side effects.    VRE UTI:  Current medication is linezolid, has a few more days left.  Daughter reports UTI better, yellow urine.  No side effects.    CVA/Ischemic cardiomyopathy s/p LVAD: Currently taking warfarin, INR drawn by [facility] and [facility].  INR goal is 1.8-2.5.  Dose of warfarin decreased today after INR results.  His dose of metoprolol was changed to 50mg ER Qam, continues to take losartan 25mg QD. Furosemide as needed and not currently using.  Aspirin 81mg QD re-added after recent stroke.  No bleeding reported from daughter.  Tolerating medications well.  Blood pressure ran low in hospital and will be rechecked tomorrow.  Pt does not want to take a statin per discharge summary.  Daughter reports she has nitroglycerin in her purse but this is  and would like a new prescription for this.  Lab Results   Component Value Date    INR 2.4 05/15/2018    INR 2.41 2018    INR 2.43 2018    INR  2.11 05/10/2018     Wt Readings from Last 4 Encounters:   05/10/18 196 lb 10.4 oz (89.2 kg)   05/07/18 184 lb (83.5 kg)   05/03/18 191 lb 5.8 oz (86.8 kg)   04/17/18 208 lb (94.3 kg)     Seizures s/p Multiple CVAs:  Taking Keppra 750mg/7.5ml BID without problems, daughter was able to describe how she draws this up in a syringe.  This was changed from a tablet at the hospital. No seizure activity reported.     GERD: Current medication is pantoprazole 20mg QD. Daughter reports patient needs this and works well for symptoms. Gives simethicone PRN which seems to help bloating, not currently taking.     Constipation: Currently taking senna-docusate 2 tablets at night and 1 in the AM.  This really helps.  BM yesterday, every other day BM.  Has bisacodyl supp but not needing to take this    BPH: Currently taking finasteride and tamsulosin. Denies SE. No urinary complaints.     Insomnia: Current medications include: melatonin 5mg. Pt reports no issues.    Pain:  Current medication is  Tylenol/acetaminophen 650mg QID.  He is not taking oxycodone at this time per daughter.    Vitamins:  Patient continues to take Vitamin B1 100mg AM,  He needs a refill on this medication.Indication listed in EPIC is CVA.    Allergic rhinitis: Current medications include loratadine 10mg once daily and ophthalmic drops - ketotifen as needed.  Pt feels that current therapy is effective, used drops at hospital but not at home.    Hypoglycemia: Has meter to test blood sugars once daily  Daughter reports blood sugars are good, yesterday in the AM .  He is eating better now daughter reports.    Current labs include:  BP Readings from Last 3 Encounters:   05/12/18 91/74   05/07/18 (!) 123/102   05/04/18 (!) 90/0     Today's Vitals: There were no vitals taken for this visit. telemed  Lab Results   Component Value Date    A1C 4.8 08/04/2017   .  Lab Results   Component Value Date    CHOL 151 05/10/2018     Lab Results   Component Value Date     TRIG 131 05/10/2018     Lab Results   Component Value Date    HDL 35 05/10/2018     Lab Results   Component Value Date    LDL 90 05/10/2018       Liver Function Studies -   Recent Labs   Lab Test  05/10/18   1045   PROTTOTAL  7.4   ALBUMIN  3.1*   BILITOTAL  0.5   ALKPHOS  95   AST  43   ALT  16       Lab Results   Component Value Date    UCRR 36 01/04/2018       Last Basic Metabolic Panel:  Lab Results   Component Value Date     05/11/2018      Lab Results   Component Value Date    POTASSIUM 4.3 05/11/2018     Lab Results   Component Value Date    CHLORIDE 110 05/11/2018     Lab Results   Component Value Date    BUN 23 05/11/2018     Lab Results   Component Value Date    CR 2.05 05/11/2018     GFR Estimate   Date Value Ref Range Status   05/11/2018 33 (L) >60 mL/min/1.7m2 Final     Comment:     Non  GFR Calc   05/10/2018 33 (L) >60 mL/min/1.7m2 Final     Comment:     Non  GFR Calc   05/07/2018 31 (L) >60 mL/min/1.7m2 Final     Comment:     Non  GFR Calc     GFR Estimate If Black   Date Value Ref Range Status   05/11/2018 39 (L) >60 mL/min/1.7m2 Final     Comment:      GFR Calc   05/10/2018 40 (L) >60 mL/min/1.7m2 Final     Comment:      GFR Calc   05/07/2018 38 (L) >60 mL/min/1.7m2 Final     Comment:      GFR Calc     TSH   Date Value Ref Range Status   08/10/2015 1.32 0.40 - 4.00 mU/L Final   ]    Most Recent Immunizations   Administered Date(s) Administered     Influenza (IIV3) PF 10/08/2015     Influenza Vaccine IM 3yrs+ 4 Valent IIV4 10/01/2017     Pneumococcal 23 valent 08/17/2014       ASSESSMENT:                 Current medications were reviewed today.      Medication Adherence: good, patient does not want to take statin    VRE UTI:  Improving, patient will finish course of antibiotics    CVA/Ischemic cardiomyopathy s/p LVAD: Needs improvement.  Patient back on both aspirin and warfarin with no side effects.   Pt would benefit from continuing to work with anticoagulation clinic to adjust warfarin, a recheck of blood pressure tomorrow, and refill on nitroglycerin.    Seizures s/p Multiple CVAs:  stable    GERD: stable     Constipation: stable    BPH: stable    Insomnia: stable    Pain:  stable    Vitamins:  Needs improvement. He needs a refill on medication, vitamin B1    Allergic rhinitis: stable    Hypoglycemia:  stable    PLAN:                MTM to send message to Dr. Almodovar who is seeing patient tomorrow for a request to refill  prescription for nitroglycerine and Vitamin B1-thiamine    I spent 18 minutes with this patient today. I offer these suggestions for consideration by the PCP. A copy of the visit note was provided to the patient's primary care provider.    Will follow up if needed, this was transitional care visit, please refer back to MTM if want ongoing MTM visits.    The patient was mailed a summary of these recommendations as an after visit summary.    Luna Carranza, PharmD BCPS  Medication Therapy Management Practitioner   #[phone]

## 2018-05-15 NOTE — PROGRESS NOTES
ANTICOAGULATION FOLLOW-UP CLINIC VISIT    Patient Name:  Sohan Rendon  Date:  5/15/2018  Contact Type:  Telephone    SUBJECTIVE:     Patient Findings     Positives Change in medications (restarted ASA 81 mg daily, on zyvox until 5/16/18), Hospital admission (5/10/18 - 5/12/18 with new stroke and UTI)           OBJECTIVE    INR   Date Value Ref Range Status   05/15/2018 2.4  Final     Chromogenic Factor 10   Date Value Ref Range Status   08/10/2014 24 (L) 70 - 130 % Final     Comment:     Therapeutic Range:  A Chromogenic Factor 10 level of approximately 20-40%   inversely correlates with an INR of 2-3 for patients receiving Warfarin.   Chromogenic Factor 10 levels below 20% indicate an INR greater than 3 and   levels above 40% indicate an INR less than 2.       Factor 2 Assay   Date Value Ref Range Status   07/21/2014 25 (L) 60 - 140 % Final     Comment:     Analyte Specific Reagents (ASRs) are used in many laboratory tests necessary   for   standard medical care and generally do not require FDA approval.  This test   was   developed and its performance characteristics determined by [facility].  It has not been cleared or approved by   the US Food and Drug Administration.         ASSESSMENT / PLAN  INR assessment THER    Recheck INR In: 3 DAYS    INR Location [facility] INR      Anticoagulation Summary as of 5/15/2018     INR goal    Prior goal 1.8-2.5   Today's INR 2.4   Maintenance plan No maintenance plan   Full instructions 5/15: 3 mg; 5/16: 3 mg; 5/17: 3 mg   Weekly total 25.5 mg   Plan last modified Blanca Bah RN (4/5/2018)   Next INR check 5/18/2018   Priority INR   Target end date Indefinite    Indications   LVAD (left ventricular assist device) present [Z95.811]  Long-term (current) use of anticoagulants [Z79.01] [Z79.01]         Anticoagulation Episode Summary     INR check location     Preferred lab     Send INR reminders to [facility]     Comments Goal Range is 1.8-2.3  [facility] comes out to draw INR  Yancy @ [phone]  Daughter Almaz Ph [phone]      Anticoagulation Care Providers     Provider Role Specialty Phone number    Evon Lemons MD Responsible Cardiology [phone]            See the Encounter Report to view Anticoagulation Flowsheet and Dosing Calendar (Go to Encounters tab in chart review, and find the Anticoagulation Therapy Visit)    Spoke with Lyly MCKINNON.      Sarah Osborne RN

## 2018-05-15 NOTE — PROGRESS NOTES
[facility]  Chief Complaint   Patient presents with     RECHECK       HPI:    Sohan Rendon is a 68 year old  (1950), who is being seen today for an episodic care visit at his home. This home visit is medically necessary as an office visit would require ambulance transportor , require excessive physical effort and cause pain .  HPI information obtained from: [facility] Epic chart review and family/first contact daughter and patient report. Initial visit post hospital, this time for new CVA and UTI as main etiology. Has had 3 ER visits and 3 hospital admissions since March 1st.  Acute and/or chronic conditions being managed today:  1. Cerebrovascular accident (CVA) due to embolism of right middle cerebral artery (H) - new area of infarct, Occipital region- vision impaired, balance worse.    2. Coronary artery disease involving native coronary artery of native heart with unstable angina pectoris (H) - no recent CP or angina   3. CKD (chronic kidney disease) stage 4, GFR 15-29 ml/min (H) - stable at GFR 30   4. Acute cystitis with hematuria - improving, finishing abx   5. Complication involving left ventricular assist device (LVAD), sequela - thrombus with multiple strokes continuing   6. Seizure (H) - stable on meds   7. Essential hypertension- tx, controlled    8. Gastroesophageal reflux disease with esophagitis - tx, on meds   9. Ischemic cardiomyopathy - LVAD tx   10. Advanced directives, counseling/discussion - reviewed condition with patient and daughter through interperter- Cardiology also has discussed. Current care plan: Continue tx all reversible conditions ok hospital and trial of intubation. NO feeding tubes, no dialysis, no other surgical procedures. Will likely continue to have embolic events   11. 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 LVAD (left ventricular assist device) present - cont monthly cardiology checks    13. Insomnia due to medical condition - sleeps only 1-2 hours at a time        Allergies   Allergen Reactions     Seasonal Allergies        Past Medical, Surgical, Family and Social History reviewed in EPIC today.    Current Outpatient Prescriptions   Medication Sig Dispense Refill     Melatonin 10 MG TABS tablet Take 1 tablet (10 mg) by mouth At Bedtime 30 tablet 11     nitroGLYcerin (NITROSTAT) 0.4 MG sublingual tablet Place 1 tablet (0.4 mg) under the tongue every 5 minutes as needed for chest pain 30 tablet 1     oxyCODONE IR (ROXICODONE) 5 MG tablet Take 1 tablet (5 mg) by mouth every 4 hours as needed for moderate to severe pain 60 tablet 0     pantoprazole (PROTONIX) 20 MG EC tablet Take 1 tablet (20 mg) by mouth every morning (before breakfast) 30 tablet 11     thiamine (VITAMIN B-1) 100 MG tablet Take 1 tablet (100 mg) by mouth daily 90 tablet 3     warfarin (COUMADIN) 1 MG tablet Take 3 tablets (3 mg) by mouth daily Or as directed after INR dosing changes 120 tablet 11     acetaminophen (TYLENOL) 325 MG tablet Take 2 tablets (650 mg) by mouth every 6 hours as needed for mild pain 100 tablet 0     aspirin 81 MG tablet Take 1 tablet (81 mg) by mouth daily 30 tablet 3     ALEK CONTOUR test strip USE TO TEST BLOOD SUGAR 2 TIMES DAILY OR AS DIRECTED. 100 strip 2     bisacodyl (DULCOLAX) 10 MG Suppository Place 1 suppository (10 mg) rectally daily as needed for constipation (Patient not taking: Reported on 5/15/2018) 5 suppository 1     blood glucose monitoring (ALEK MICROLET) lancets USE TO TEST BLOOD SUGAR 2 TIMES DAILY OR AS DIRECTED 100 each 2     blood glucose monitoring (NO BRAND SPECIFIED) lancets Use to test blood sugars 2 times daily or as directed. 1 Box 3     finasteride (PROSCAR) 5 MG tablet Take 1 tablet (5 mg) by mouth daily 90 tablet 3     furosemide (LASIX) 20 MG tablet Take 1 tablet (20 mg) by mouth as needed 20 tablet 11     ipratropium - albuterol 0.5 mg/2.5 mg/3 mL (DUONEB) 0.5-2.5 (3)  MG/3ML neb solution Take 1 vial (3 mLs) by nebulization every 6 hours as needed for shortness of breath / dyspnea, wheezing or other (Patient not taking: Reported on 5/15/2018) 360 mL 11     ketotifen (ZADITOR/REFRESH ANTI-ITCH) 0.025 % SOLN ophthalmic solution Place 1 drop into both eyes 2 times daily (Patient taking differently: Place 1 drop into both eyes daily ) 1 Bottle 11     levETIRAcetam (KEPPRA) 100 MG/ML solution Take 7.5 mLs (750 mg) by mouth every 12 hours 500 mL 3     linezolid (ZYVOX) 600 MG tablet Take 1 tablet (600 mg) by mouth 2 times daily for 4 days 9 tablet 0     loratadine (CLARITIN) 10 MG tablet TAKE 1 TABLET (10 MG) BY MOUTH DAILY 90 tablet 2     losartan (COZAAR) 25 MG tablet Take 1 tablet (25 mg) by mouth daily 15 tablet 3     Metoprolol Succinate (TOPROL XL PO) Take 50 mg by mouth daily        ORDER FOR DME Equipment being ordered: Lift chair.    Please see indications and face-to-face encounter details in 2/3/15 Office Visit note. 1 Device 0     order for DME Equipment being ordered: Bilateral leg supports for manual wheelchair. 2 each 0     order for DME Equipment being ordered: Reclining manual w/c with bilateral elevating leg rests. 1 each 0     order for DME Equipment being ordered: Hospital Bed, fully electric. 1 each 0     order for DME Equipment being ordered: Wheelchair, manual. 1 each 0     order for DME Equipment being ordered: Wheelchair, manual, with elevated leg rests and tilt in space back.  Please fax to [facility] [place]; I called them 11/26/16 and they requested the new order.  Face to face is in my 10/26/16 note. 1 each 0     order for DME Equipment being ordered: Cushioned heel boots. 2 each 0     order for DME Bilateral heel protective cushioning boots.  Dx: previous stroke with left hemiplegia.  Duration of need: 99 months. 2 each 0     order for DME Equipment being ordered: Nebulizer 1 each 11     senna-docusate (SENEXON-S) 8.6-50 MG per tablet Take 2 tablets by mouth 2 times  daily as needed for constipation 60 tablet 1     simethicone (MYLICON) 80 MG chewable tablet Take 1 tablet (80 mg) by mouth 4 times daily (Patient not taking: Reported on 5/15/2018) 180 tablet 5     tamsulosin (FLOMAX) 0.4 MG capsule Take 2 capsules (0.8 mg) by mouth daily 60 capsule 11     [DISCONTINUED] nitroglycerin (NITROSTAT) 0.4 MG SL tablet Place 1 tablet (0.4 mg) under the tongue every 5 minutes as needed for chest pain (Patient not taking: Reported on 5/15/2018) 30 tablet 1     [DISCONTINUED] warfarin (COUMADIN) 1 MG tablet Take 3 tablets (3 mg) by mouth daily 30 tablet 1       REVIEW OF SYSTEMS:  Limited secondary to aphasia impairment but today pt reports no pain or SOB    There were no vitals taken for this visit.  GENERAL APPEARANCE:  Alert, appears ill, cooperative  RESP:  respiratory effort and palpation of chest normal, lungs clear to auscultation , no respiratory distress  CV:  Palpation and auscultation of heart done , peripheral edema 2+ in le, irregular rhythm rate nl, mechanical hum of LVAD  ABDOMEN:  normal bowel sounds, soft, nontender, no hepatosplenomegaly or other masses  M/S:   Gait and station abnormal non ambulatory, 2 person assist to transfer  NEURO:   new neurological signs of recent occipital CVA- vision decreased  PSYCH:  insight and judgement impaired, memory impaired     Recent Labs:   CBC RESULTS:   Recent Labs   Lab Test  05/11/18   0551  05/10/18   1045   WBC  6.6  7.4   RBC  3.28*  3.50*   HGB  9.1*  9.8*   HCT  28.9*  31.8*   MCV  88  91   MCH  27.7  28.0   MCHC  31.5  30.8*   RDW  21.7*  22.2*   PLT  200  210       Last Basic Metabolic Panel:  Recent Labs   Lab Test  05/11/18   0551  05/10/18   1045   NA  139  137   POTASSIUM  4.3  4.2   CHLORIDE  110*  108   JOSÉ  8.2*  8.0*   CO2  20  19*   BUN  23  24   CR  2.05*  2.01*   GLC  111*  175*       Liver Function Studies -   Recent Labs   Lab Test  05/10/18   1045  05/04/18   1322   PROTTOTAL  7.4  7.4   ALBUMIN  3.1*  3.2*    BILITOTAL  0.5  0.6   ALKPHOS  95  93   AST  43  59*   ALT  16  16       TSH   Date Value Ref Range Status   08/10/2015 1.32 0.40 - 4.00 mU/L Final   01/28/2015 0.89 0.40 - 4.00 mU/L Final     Comment:     Effective 7/30/2014, the reference range for this assay has changed to reflect   new instrumentation/methodology.       Lab Results   Component Value Date    A1C 4.8 08/04/2017    A1C 5.2 07/21/2016     Assessment/Plan:  1. Cerebrovascular accident (CVA) due to embolism of right middle cerebral artery (H) - new area of infarct, Occipital region- vision impaired, balance worse. - home PT to see   2. Coronary artery disease involving native coronary artery of native heart with unstable angina pectoris (H) - no recent CP or angina, needs refill of NTG   3. CKD (chronic kidney disease) stage 4, GFR 15-29 ml/min (H) - stable at GFR 30   4. Acute cystitis with hematuria - improving, finishing abx, monitor   5. Complication involving left ventricular assist device (LVAD), sequela - thrombus with multiple strokes continuing   6. Seizure (H) - stable on meds, switched from tablet to liquid for ease of swallowing   7. Essential hypertension- tx, controlled    8. Gastroesophageal reflux disease with esophagitis - tx, on meds, continue   9. Ischemic cardiomyopathy - LVAD tx- continue   10. Advanced directives, counseling/discussion - reviewed condition with patient and daughter through interperter- Cardiology also has discussed. Current care plan: Continue tx all reversible conditions ok hospital and trial of intubation. NO feeding tubes, no dialysis, no other surgical procedures. Will likely continue to have embolic events   11. 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 LVAD (left ventricular assist device) present - cont monthly cardiology checks   13. Insomnia due to medical condition - sleeps only 1-2 hours at a time-  increase Melatonin to 10 mg q HS, add oxycodone prn if needed- home care to check in 2 weeks         Next visit will be scheduled for 1 month.    Time: 50 minutes> half reviewing care plan and goals of care    Electronically signed by:  Raghu Almodovar MD

## 2018-05-15 NOTE — Clinical Note
I see that you are seeing patient tomorrow.  Saw as transitions of care patient.  Daughter is requesting refill of  nitroglycerin and B1 refilled if you could help her with that please.  Thank you!

## 2018-05-15 NOTE — PROGRESS NOTES
[facility] Care Coordination Contact  CM received a call back from client's daughter Antionette today. She states client is doing better. He was seen by [facility] nurse. He has a home visit with his PCP 05/16 and an appointment with cardiology on 05/17. No questions or concerns at this time.   Nena Salazar RN  [facility]   [phone]

## 2018-05-15 NOTE — MR AVS SNAPSHOT
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 Almodovar who is seeing Sohan tomorrow for a request to refill  prescription for nitroglycerine and Vitamin B 1-thiamine    Next MTM/pharmacist visit: as needed    To schedule another MTM appointment, please call the clinic directly or you may call the MTM scheduling line at [phone] or toll-free at [phone].     My Clinical Pharmacist's contact information:                                                      It was a pleasure seeing you today!  Please feel free to contact me with any questions or concerns you have.      Luna Carranza, PharmD BCPS  Medication Therapy Management Practitioner   #[phone]                  Follow-ups after your visit        Your next 10 appointments already scheduled     May 16, 2018  2:00 PM CDT   Home Follow Up with Raghu Almodovar MD   [facility])    [address] [place] [id number]  "  [phone]            May 17, 2018  8:45 AM CDT   RETURN HEART FAILURE with Byron Moore MD   [facility] ([facility]    [address] [facility]  [address] [place] [id number] [phone]              Who to contact     If you have questions or need follow up information about today's clinic visit or your schedule please contact [facility] directly at [phone].  Normal or non-critical lab and imaging results will be communicated to you by MyChart, letter or phone within 4 business days after the clinic has received the results. If you do not hear from us within 7 days, please contact the clinic through MyChart or phone. If you have a critical or abnormal lab result, we will notify you by phone as soon as possible.  Submit refill requests through [employer] [facility] or call your pharmacy and they will forward the refill request to us. Please allow 3 business days for your refill to be completed.          Additional Information About Your Visit        [facility] Information     [employer] lets you send messages to your doctor, view your test results, renew your prescriptions, schedule appointments and more. To sign up, go to [email or web address] . Click on \"Log in\" on the left side of the screen, which will take you to the Welcome page. Then click on \"Sign up Now\" on the right side of the page.     You will be asked to enter the access code listed below, as well as some personal information. Please follow the directions to create your username and password.     Your access code is: [email or web address]  Expires: 2018  3:27 PM     Your access code will  in 90 days. If you need help or a new code, please call your [facility] clinic or [phone].        Care EveryWhere ID     This is your Care EveryWhere ID. This could be used by other organizations to access your [facility] medical records  [id number]         Blood Pressure from Last 3 Encounters:   18 91/74 "   05/07/18 (!) 123/102   05/04/18 (!) 90/0    Weight from Last 3 Encounters:   05/10/18 196 lb 10.4 oz (89.2 kg)   05/07/18 184 lb (83.5 kg)   05/03/18 191 lb 5.8 oz (86.8 kg)              Today, you had the following     No orders found for display         Today's Medication Changes          These changes are accurate as of 5/15/18  3:06 PM.  If you have any questions, ask your nurse or doctor.               These medicines have changed or have updated prescriptions.        Dose/Directions    ketotifen 0.025 % Soln ophthalmic solution   Commonly known as:  ZADITOR/REFRESH ANTI-ITCH   This may have changed:  when to take this   Used for:  Allergic conjunctivitis, bilateral        Dose:  1 drop   Place 1 drop into both eyes 2 times daily   Quantity:  1 Bottle   Refills:  11                Primary Care Provider Office Phone # Fax #    Shilpi Agosto, APRN [phone]       [address] [address]        Equal Access to Services     [facility]: Hadii aad ku hadasho Soomaali, waaxda luqadaha, qaybta kaalmada adeegyada, waxay lilliein hayfrancisco dorman . So [facility] [phone].    ATENCIÓN: Si habla español, tiene a smith disposición servicios gratuitos de asistencia lingüística. Llame al [phone].    We comply with applicable federal civil rights laws and [place] laws. We do not discriminate on the basis of race, color, national origin, age, disability, sex, sexual orientation, or gender identity.            Thank you!     Thank you for choosing [facility]  for your care. Our goal is always to provide you with excellent care. Hearing back from our patients is one way we can continue to improve our services. Please take a few minutes to complete the written survey that you may receive in the mail after your visit with us. Thank you!             Your Updated Medication List - Protect others around you: Learn how to safely use, store and throw away your  medicines at www.disposemymeds.org.          This list is accurate as of 5/15/18  3:06 PM.  Always use your most recent med list.                   Brand Name Dispense Instructions for use Diagnosis    acetaminophen 325 MG tablet    TYLENOL    100 tablet    Take 2 tablets (650 mg) by mouth every 6 hours as needed for mild pain        aspirin 81 MG tablet     30 tablet    Take 1 tablet (81 mg) by mouth daily    Ischemic cardiomyopathy       ALEK CONTOUR test strip   Generic drug:  blood glucose monitoring     100 strip    USE TO TEST BLOOD SUGAR 2 TIMES DAILY OR AS DIRECTED.    Hypoglycemia       bisacodyl 10 MG Suppository    DULCOLAX    5 suppository    Place 1 suppository (10 mg) rectally daily as needed for constipation    Drug-induced constipation       * blood glucose monitoring lancets     1 Box    Use to test blood sugars 2 times daily or as directed.    Diabetes mellitus, type 2 (H)       * blood glucose monitoring lancets     100 each    USE TO TEST BLOOD SUGAR 2 TIMES DAILY OR AS DIRECTED    Diabetes mellitus, type 2 (H)       finasteride 5 MG tablet    PROSCAR    90 tablet    Take 1 tablet (5 mg) by mouth daily    Incomplete bladder emptying       furosemide 20 MG tablet    LASIX    20 tablet    Take 1 tablet (20 mg) by mouth as needed    Chronic systolic congestive heart failure (H)       ipratropium - albuterol 0.5 mg/2.5 mg/3 mL 0.5-2.5 (3) MG/3ML neb solution    DUONEB    360 mL    Take 1 vial (3 mLs) by nebulization every 6 hours as needed for shortness of breath / dyspnea, wheezing or other    Pulmonary atelectasis       ketotifen 0.025 % Soln ophthalmic solution    ZADITOR/REFRESH ANTI-ITCH    1 Bottle    Place 1 drop into both eyes 2 times daily    Allergic conjunctivitis, bilateral       levETIRAcetam 100 MG/ML solution    KEPPRA    500 mL    Take 7.5 mLs (750 mg) by mouth every 12 hours    Seizure (H)       linezolid 600 MG tablet    ZYVOX    9 tablet    Take 1 tablet (600 mg) by mouth 2 times  daily for 4 days    Sepsis due to urinary tract infection (H)       loratadine 10 MG tablet    CLARITIN    90 tablet    TAKE 1 TABLET (10 MG) BY MOUTH DAILY    Allergic rhinitis       losartan 25 MG tablet    COZAAR    15 tablet    Take 1 tablet (25 mg) by mouth daily    Chronic systolic congestive heart failure (H)       MELATONIN PO      Take 5 mg by mouth At Bedtime        nitroGLYcerin 0.4 MG sublingual tablet    NITROSTAT    30 tablet    Place 1 tablet (0.4 mg) under the tongue every 5 minutes as needed for chest pain    Coronary artery disease involving native coronary artery with unstable angina pectoris (H)       order for DME     1 Device    Equipment being ordered: Lift chair.  Please see indications and face-to-face encounter details in 2/3/15 Office Visit note.    Fracture of femoral neck, right, closed, initial encounter, Stroke (H), CHF (congestive heart failure), NYHA class IV (H)       * order for DME     2 each    Equipment being ordered: Bilateral leg supports for manual wheelchair.    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  Please fax to [facility] [place]; I called them 11/26/16 and they requested the new order.  Face to face is in my 10/26/16 note.    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  Dx: previous stroke with left hemiplegia.  Duration of need: 99 months.    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, subsequent encounter       PANTOPRAZOLE SODIUM PO      Take 20 mg by mouth every morning (before breakfast)        senna-docusate 8.6-50 MG per tablet    SENEXON-S    60 tablet    Take 2 tablets by mouth 2 times daily as needed for constipation    Other constipation       simethicone 80 MG chewable tablet    MYLICON    180 tablet    Take 1 tablet (80 mg) by mouth 4 times daily    Slow transit constipation       tamsulosin 0.4 MG capsule    FLOMAX    60 capsule    Take 2 capsules (0.8 mg) by mouth daily    Incomplete bladder emptying       thiamine 100 MG tablet     90 tablet    TAKE 1 TABLET (100 MG) BY MOUTH DAILY    Cerebrovascular accident (CVA) due to embolism of right middle cerebral artery (H)       TOPROL XL PO      Take 50 mg by mouth daily        warfarin 1 MG tablet    COUMADIN    30 tablet    Take 3 tablets (3 mg) by mouth  daily    LVAD (left ventricular assist device) present (H)       * Notice:  This list has 9 medication(s) that are the same as other medications prescribed for you. Read the directions carefully, and ask your doctor or other care provider to review them with you.

## 2018-05-15 NOTE — PATIENT INSTRUCTIONS
Recommendations from today's MTM visit:                                                      I sent a message to Dr. Almodovar who is seeing Sohan tomorrow for a request to refill  prescription for nitroglycerine and Vitamin B 1-thiamine    Next MTM/pharmacist visit: as needed    To schedule another MTM appointment, please call the clinic directly or you may call the MTM scheduling line at [phone] or toll-free at [phone].     My Clinical Pharmacist's contact information:                                                      It was a pleasure seeing you today!  Please feel free to contact me with any questions or concerns you have.      Luna Carranza, PharmD [facility]  Medication Therapy Management Practitioner   #[phone]

## 2018-05-15 NOTE — MR AVS SNAPSHOT
Sohan Justice   5/15/2018   Anticoagulation Therapy Visit    Description:  68 year old male   Provider:  Sarah Osboren, RN   Department:  [facility]           INR as of 5/15/2018     Today's INR 2.4      Anticoagulation Summary as of 5/15/2018     INR goal    Prior goal 1.8-2.5   Today's INR 2.4   Full instructions 5/15: 3 mg; 5/16: 3 mg; 5/17: 3 mg   Next INR check 5/18/2018    Indications   LVAD (left ventricular assist device) present [Z95.811]  Long-term (current) use of anticoagulants [Z79.01] [Z79.01]         May 2018 Details    Sun Mon Tue Wed Thu Fri Sat       1               2               3               4               5                 6               7               8               9               10               11               12                 13               14               15      3 mg   See details      16      3 mg         17      3 mg         18            19                 20               21               22               23               24               25               26                 27               28               29               30               31                  Date Details   05/15 This INR check       Date of next INR:  5/18/2018         How to take your warfarin dose     To take:  3 mg Take 1 of the 3 mg tablets.

## 2018-05-16 NOTE — ED TRIAGE NOTES
Pt BIBA with family with concerns of LVAD. Pt's daughter reports LVAD was beeping and it started about 45 minutes ago. LVAD currently is not beeping. LVAD coordinator aware pt is coming to ED.

## 2018-05-16 NOTE — DISCHARGE INSTRUCTIONS
Home.  Xray looks OK.  Call LVAD coordinator if any concerns at all.  They will call you tomorrow.  REturn if concerns.

## 2018-05-16 NOTE — PROGRESS NOTES
Addendum:   Pt's family paged VAD Coordinator on call (PANDA Castaneda RN) shortly after writer left below message. They had already called for an ambulance because they feel it is unsafe and pt has had additional alarms since our conversation.

## 2018-05-16 NOTE — ED AVS SNAPSHOT
[facility], [place], Emergency Department    [address] [facility] [address]    Phone:  [phone]                                       Sohan Rendon   MRN: [id number]    Department:  [facility], Emergency Department   Date of Visit:  5/16/2018           After Visit Summary Signature Page     I have received my discharge instructions, and my questions have been answered. I have discussed any challenges I see with this plan with the nurse or doctor.    ..........................................................................................................................................  Patient/Patient Representative Signature      ..........................................................................................................................................  Patient Representative Print Name and Relationship to Patient    ..................................................               ................................................  Date                                            Time    ..........................................................................................................................................  Reviewed by Signature/Title    ...................................................              ..............................................  Date                                                            Time

## 2018-05-16 NOTE — PROGRESS NOTES
Pt's daughters called stating that pt had 3 beeps (one while on the phone w/ writer) from VAD. Beeps were single, brief, and they occurred so quickly family was unable to see any associated lights on controller. Of note, pt has EPC controller so alarm history review is not an option. Pt was on PM at the time, sitting up, immobile, and was completely asymptomatic with alarms.  Equipment reviewed completely by family. They report a small cut in driveline that has been assessed, though it remains unclear after significant conversation who assessed it and when. It is currently covered with tape, per daughters and no wires/cuts visible. No other bends, kinks, broken pins, etc noted. VAD parameters WNL.    Per [employer] database, it has been > 1 yr since patient cable was replaced and that may be the culprit. Left message with pt's family that we will assess at clinic visit tomorrow. If he has any of the following, they should page VAD Coordinator first who will decide if we may need to assess sooner in ED: SOB, dizziness, CP, feeling generally unwell, emergency VAD alarms, increasing frequency of these alarms, any noticeable damage to equip that wasn't previously noticed.

## 2018-05-16 NOTE — MR AVS SNAPSHOT
After Visit Summary   5/16/2018    Sohan Rendon    MRN: [id number]           Patient Information     Date Of Birth          1950        Visit Information        Provider Department      5/16/2018 2:00 PM Raghu Almodovar MD; [employer] [facility]        Today's Diagnoses     Cerebrovascular accident (CVA) due to embolism of right middle cerebral artery (H)    -  1    Coronary artery disease involving native coronary artery of native heart with unstable angina pectoris (H)        CKD (chronic kidney disease) stage 4, GFR 15-29 ml/min (H)        Acute cystitis with hematuria        Complication involving left ventricular assist device (LVAD), sequela        Seizure (H)        Essential hypertension        Gastroesophageal reflux disease with esophagitis        Ischemic cardiomyopathy        Advanced directives, counseling/discussion        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 ([facility])    [address] [place] [address] Floor  [facility] [address] [phone]            May 30, 2018  2:20 PM CDT   (Arrive by 2:05 PM)   Ventricular Assist Device with Evon Lemons MD   [facility] ([facility])    [address] [place] [address]  [address] [place] [id number] [phone]              Who to contact     If you have questions or need follow up information about today's clinic visit or your schedule please contact [facility] directly at [phone].  Normal or non-critical lab and imaging results will be communicated to you by MyChart, letter or phone within 4 business days after the clinic has received the  "results. If you do not hear from us within 7 days, please contact the clinic through [employer] or phone. If you have a critical or abnormal lab result, we will notify you by phone as soon as possible.  Submit refill requests through [employer] or call your pharmacy and they will forward the refill request to us. Please allow 3 business days for your refill to be completed.          Additional Information About Your Visit        [employer] Information     [employer] lets you send messages to your doctor, view your test results, renew your prescriptions, schedule appointments and more. To sign up, go to [email or web address] . Click on \"Log in\" on the left side of the screen, which will take you to the Welcome page. Then click on \"Sign up Now\" on the right side of the page.     You will be asked to enter the access code listed below, as well as some personal information. Please follow the directions to create your username and password.     Your access code is: [email or web address]  Expires: 2018  3:27 PM     Your access code will  in 90 days. If you need help or a new code, please call your [place] clinic or [phone].        Care EveryWhere ID     This is your Care EveryWhere ID. This could be used by other organizations to access your [place] medical records  [id number]         Blood Pressure from Last 3 Encounters:   18 103/78   18 91/74   18 (!) 123/102    Weight from Last 3 Encounters:   18 190 lb (86.2 kg)   05/10/18 196 lb 10.4 oz (89.2 kg)   18 184 lb (83.5 kg)              Today, you had the following     No orders found for display         Today's Medication Changes          These changes are accurate as of 18 11:59 PM.  If you have any questions, ask your nurse or doctor.               Start taking these medicines.        Dose/Directions    STATIN NOT PRESCRIBED (INTENTIONAL)   Used for:  Coronary artery disease involving native coronary artery of native heart with " unstable angina pectoris (H), Ischemic cardiomyopathy   Started by:  Raghu Almodovar MD        Dose:  1 each   1 each Crash Kit for 1 dose Please choose reason not prescribed, below   Refills:  0         These medicines have changed or have updated prescriptions.        Dose/Directions    ketotifen 0.025 % Soln ophthalmic solution   Commonly known as:  ZADITOR/REFRESH ANTI-ITCH   This may have changed:  when to take this   Used for:  Allergic conjunctivitis, bilateral        Dose:  1 drop   Place 1 drop into both eyes 2 times daily   Quantity:  1 Bottle   Refills:  11       Melatonin 10 MG Tabs tablet   This may have changed:  medication strength   Used for:  Insomnia due to medical condition   Changed by:  Raghu Almodovar MD        Dose:  10 mg   Take 1 tablet (10 mg) by mouth At Bedtime   Quantity:  30 tablet   Refills:  11       warfarin 1 MG tablet   Commonly known as:  COUMADIN   This may have changed:  additional instructions   Used for:  LVAD (left ventricular assist device) present (H)   Changed by:  Raghu Almodovar MD        Dose:  3 mg   Take 3 tablets (3 mg) by mouth daily Or as directed after INR dosing changes   Quantity:  120 tablet   Refills:  11            Where to get your medicines      These medications were sent to [facility]/pharmacy #[id number] - [place], [place] - [address], [place] [address]     Phone:  [phone]     Melatonin 10 MG Tabs tablet    nitroGLYcerin 0.4 MG sublingual tablet    pantoprazole 20 MG EC tablet    thiamine 100 MG tablet    warfarin 1 MG tablet         Some of these will need a paper prescription and others can be bought over the counter.  Ask your nurse if you have questions.     Bring a paper prescription for each of these medications     oxyCODONE IR 5 MG tablet       You don't need a prescription for these medications     STATIN NOT PRESCRIBED (INTENTIONAL)               Information about OPIOIDS     PRESCRIPTION  OPIOIDS: WHAT YOU NEED TO KNOW   You have a prescription for an opioid (narcotic) pain medicine. Opioids can cause addiction. If you have a history of chemical dependency of any type, you are at a higher risk of becoming addicted to opioids. Only take this medicine after all other options have been tried. Take it for as short a time and as few doses as possible.     Do not:    Drive. If you drive while taking these medicines, you could be arrested for driving under the influence (DUI).    Operate heavy machinery    Do any other dangerous activities while taking these medicines.     Drink any alcohol while taking these medicines.      Take with any other medicines that contain acetaminophen. Read all labels carefully. Look for the word  acetaminophen  or  Tylenol.  Ask your pharmacist if you have questions or are unsure.    Store your pills in a secure place, locked if possible. We will not replace any lost or stolen medicine. If you don t finish your medicine, please throw away (dispose) as directed by your pharmacist. The [place] [employer]ution [employer] has more information about safe disposal: https://www.pca.[facility].[place].us/living-green/managing-unwanted-medications    All opioids tend to cause constipation. Drink plenty of water and eat foods that have a lot of fiber, such as fruits, vegetables, prune juice, apple juice and high-fiber cereal. Take a laxative (Miralax, milk of magnesia, Colace, Senna) if you don t move your bowels at least every other day.          Primary Care Provider Office Phone # Fax #    CLAIRE Rosas [phone]       [facility] [address] [address]        Equal Access to Services     [facility]: Hadalee fernández Soedna, waaxda luqadaha, qaybta kaalmada willie rose . So [facility] [phone].    ATENCIÓN: Si habla español, tiene a smith disposición servicios gratuitos de asistencia lingüística. Llame al  [phone].    We comply with applicable federal civil rights laws and [place] laws. We do not discriminate on the basis of race, color, national origin, age, disability, sex, sexual orientation, or gender identity.            Thank you!     Thank you for choosing [facility]  for your care. Our goal is always to provide you with excellent care. Hearing back from our patients is one way we can continue to improve our services. Please take a few minutes to complete the written survey that you may receive in the mail after your visit with us. Thank you!             Your Updated Medication List - Protect others around you: Learn how to safely use, store and throw away your medicines at www.disposemymeds.org.          This list is accurate as of 5/16/18 11:59 PM.  Always use your most recent med list.                   Brand Name Dispense Instructions for use Diagnosis    acetaminophen 325 MG tablet    TYLENOL    100 tablet    Take 2 tablets (650 mg) by mouth every 6 hours as needed for mild pain        aspirin 81 MG tablet     30 tablet    Take 1 tablet (81 mg) by mouth daily    Ischemic cardiomyopathy       ALEK CONTOUR test strip   Generic drug:  blood glucose monitoring     100 strip    USE TO TEST BLOOD SUGAR 2 TIMES DAILY OR AS DIRECTED.    Hypoglycemia       bisacodyl 10 MG Suppository    DULCOLAX    5 suppository    Place 1 suppository (10 mg) rectally daily as needed for constipation    Drug-induced constipation       * blood glucose monitoring lancets     1 Box    Use to test blood sugars 2 times daily or as directed.    Diabetes mellitus, type 2 (H)       * blood glucose monitoring lancets     100 each    USE TO TEST BLOOD SUGAR 2 TIMES DAILY OR AS DIRECTED    Diabetes mellitus, type 2 (H)       finasteride 5 MG tablet    PROSCAR    90 tablet    Take 1 tablet (5 mg) by mouth daily    Incomplete bladder emptying       furosemide 20 MG tablet    LASIX    20 tablet    Take 1 tablet (20 mg) by mouth  as needed    Chronic systolic congestive heart failure (H)       ipratropium - albuterol 0.5 mg/2.5 mg/3 mL 0.5-2.5 (3) MG/3ML neb solution    DUONEB    360 mL    Take 1 vial (3 mLs) by nebulization every 6 hours as needed for shortness of breath / dyspnea, wheezing or other    Pulmonary atelectasis       ketotifen 0.025 % Soln ophthalmic solution    ZADITOR/REFRESH ANTI-ITCH    1 Bottle    Place 1 drop into both eyes 2 times daily    Allergic conjunctivitis, bilateral       levETIRAcetam 100 MG/ML solution    KEPPRA    500 mL    Take 7.5 mLs (750 mg) by mouth every 12 hours    Seizure (H)       linezolid 600 MG tablet    ZYVOX    9 tablet    Take 1 tablet (600 mg) by mouth 2 times daily for 4 days    Sepsis due to urinary tract infection (H)       loratadine 10 MG tablet    CLARITIN    90 tablet    TAKE 1 TABLET (10 MG) BY MOUTH DAILY    Allergic rhinitis       losartan 25 MG tablet    COZAAR    15 tablet    Take 1 tablet (25 mg) by mouth daily    Chronic systolic congestive heart failure (H)       Melatonin 10 MG Tabs tablet     30 tablet    Take 1 tablet (10 mg) by mouth At Bedtime    Insomnia due to medical condition       nitroGLYcerin 0.4 MG sublingual tablet    NITROSTAT    30 tablet    Place 1 tablet (0.4 mg) under the tongue every 5 minutes as needed for chest pain    Coronary artery disease involving native coronary artery of native heart with unstable angina pectoris (H)       order for DME     1 Device    Equipment being ordered: Lift chair.  Please see indications and face-to-face encounter details in 2/3/15 Office Visit note.    Fracture of femoral neck, right, closed, initial encounter, Stroke (H), CHF (congestive heart failure), NYHA class IV (H)       * order for DME     2 each    Equipment being ordered: Bilateral leg supports for manual wheelchair.    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  Please fax to [employer]; I called them 11/26/16 and they requested the new order.  Face to face is in my 10/26/16 note.    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  Dx: previous stroke with left hemiplegia.  Duration of need: 99 months.    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, subsequent encounter       pantoprazole 20 MG EC tablet    PROTONIX     30 tablet    Take 1 tablet (20 mg) by mouth every morning (before breakfast)    Gastroesophageal reflux disease with esophagitis       senna-docusate 8.6-50 MG per tablet    SENEXON-S    60 tablet    Take 2 tablets by mouth 2 times daily as needed for constipation    Other constipation       simethicone 80 MG chewable tablet    MYLICON    180 tablet    Take 1 tablet (80 mg) by mouth 4 times daily    Slow transit constipation       STATIN NOT PRESCRIBED (INTENTIONAL)      1 each Crash Kit for 1 dose Please choose reason not prescribed, below    Coronary artery disease involving native coronary artery of native heart with unstable angina pectoris (H), Ischemic cardiomyopathy       tamsulosin 0.4 MG capsule    FLOMAX    60 capsule    Take 2 capsules (0.8 mg) by mouth daily    Incomplete bladder emptying       thiamine 100 MG tablet     90 tablet    Take 1 tablet (100 mg) by mouth daily    Cerebrovascular accident (CVA) due to embolism of right middle cerebral artery (H)       TOPROL XL PO      Take 50 mg by mouth daily        warfarin 1 MG tablet    COUMADIN    120 tablet    Take 3 tablets (3 mg) by mouth daily Or as directed after INR dosing changes    LVAD (left ventricular assist device) present (H)       * Notice:  This list has 9 medication(s) that are the same as other medications prescribed for you. Read the directions carefully, and ask your doctor or other care provider to review them with you.

## 2018-05-16 NOTE — ED AVS SNAPSHOT
[facility], Emergency Department    [address] [address]    Phone:  [phone]                                       Sohan Rendon   MRN: [id number]    Department:  [facility], Emergency Department   Date of Visit:  5/16/2018           Patient Information     Date Of Birth          1950        Your diagnoses for this visit were:     LVAD (left ventricular assist device) present (H)        You were seen by Jose Roberto Underwood MD.      Follow-up Information     Follow up with Shilpi Agosto APRN CNP.    Specialties:  Internal Medicine, Internal Medicine - Geriatric Medicine    Contact information:    [address]  [facility] [address]  [phone]          Discharge Instructions       Home.  Xray looks OK.  Call LVAD coordinator if any concerns at all.  They will call you tomorrow.  REturn if concerns.        Your next 10 appointments already scheduled     May 17, 2018  8:45 AM CDT   Nurse Visit with  Cvc Nurse   [facility] ([facility])    [address] [place] [address]  [address] [place] [id number] [phone]            Jun 06, 2018 12:30 PM CDT   Lab with [facility]    [facility] ([facility])    [address] [place] [address]  1st Floor  [facility] [id number] [phone]            Jun 06, 2018  1:00 PM CDT   (Arrive by 12:45 PM)   Ventricular Assist Device with Vicky Ziegler NP   [facility] ([facility])    [address] [place] [address]  [address] [place] [id number] [phone]              24 Hour Appointment Hotline       To make an appointment at any [facility] call [phone] ([phone]). If you don't have a family doctor or clinic, we will help you find one. [place] clinics are conveniently located to serve the needs of you and your family.             Review of your medicines      START taking        Dose / Directions Last dose taken    STATIN NOT PRESCRIBED  (INTENTIONAL)   Dose:  1 each        1 each Crash Kit for 1 dose Please choose reason not prescribed, below   Refills:  0          Our records show that you are taking the medicines listed below. If these are incorrect, please call your family doctor or clinic.        Dose / Directions Last dose taken    acetaminophen 325 MG tablet   Commonly known as:  TYLENOL   Dose:  650 mg   Quantity:  100 tablet        Take 2 tablets (650 mg) by mouth every 6 hours as needed for mild pain   Refills:  0        aspirin 81 MG tablet   Dose:  81 mg   Quantity:  30 tablet        Take 1 tablet (81 mg) by mouth daily   Refills:  3        ALEK CONTOUR test strip   Quantity:  100 strip   Generic drug:  blood glucose monitoring        USE TO TEST BLOOD SUGAR 2 TIMES DAILY OR AS DIRECTED.   Refills:  2        bisacodyl 10 MG Suppository   Commonly known as:  DULCOLAX   Dose:  10 mg   Quantity:  5 suppository        Place 1 suppository (10 mg) rectally daily as needed for constipation   Refills:  1        * blood glucose monitoring lancets   Quantity:  1 Box        Use to test blood sugars 2 times daily or as directed.   Refills:  3        * blood glucose monitoring lancets   Quantity:  100 each        USE TO TEST BLOOD SUGAR 2 TIMES DAILY OR AS DIRECTED   Refills:  2        finasteride 5 MG tablet   Commonly known as:  PROSCAR   Dose:  5 mg   Quantity:  90 tablet        Take 1 tablet (5 mg) by mouth daily   Refills:  3        furosemide 20 MG tablet   Commonly known as:  LASIX   Dose:  20 mg   Quantity:  20 tablet        Take 1 tablet (20 mg) by mouth as needed   Refills:  11        ipratropium - albuterol 0.5 mg/2.5 mg/3 mL 0.5-2.5 (3) MG/3ML neb solution   Commonly known as:  DUONEB   Dose:  1 vial   Quantity:  360 mL        Take 1 vial (3 mLs) by nebulization every 6 hours as needed for shortness of breath / dyspnea, wheezing or other   Refills:  11        ketotifen 0.025 % Soln ophthalmic solution   Commonly known as:   ZADITOR/REFRESH ANTI-ITCH   Dose:  1 drop   Quantity:  1 Bottle        Place 1 drop into both eyes 2 times daily   Refills:  11        levETIRAcetam 100 MG/ML solution   Commonly known as:  KEPPRA   Dose:  750 mg   Quantity:  500 mL        Take 7.5 mLs (750 mg) by mouth every 12 hours   Refills:  3        linezolid 600 MG tablet   Commonly known as:  ZYVOX   Dose:  600 mg   Quantity:  9 tablet        Take 1 tablet (600 mg) by mouth 2 times daily for 4 days   Refills:  0        loratadine 10 MG tablet   Commonly known as:  CLARITIN   Quantity:  90 tablet        TAKE 1 TABLET (10 MG) BY MOUTH DAILY   Refills:  2        losartan 25 MG tablet   Commonly known as:  COZAAR   Dose:  25 mg   Quantity:  15 tablet        Take 1 tablet (25 mg) by mouth daily   Refills:  3        Melatonin 10 MG Tabs tablet   Dose:  10 mg   Quantity:  30 tablet        Take 1 tablet (10 mg) by mouth At Bedtime   Refills:  11        nitroGLYcerin 0.4 MG sublingual tablet   Commonly known as:  NITROSTAT   Dose:  0.4 mg   Quantity:  30 tablet        Place 1 tablet (0.4 mg) under the tongue every 5 minutes as needed for chest pain   Refills:  1        order for DME   Quantity:  1 Device        Equipment being ordered: Lift chair.  Please see indications and face-to-face encounter details in 2/3/15 Office Visit note.   Refills:  0        * order for DME   Quantity:  2 each        Equipment being ordered: Bilateral leg supports for manual wheelchair.   Refills:  0        * order for DME   Quantity:  1 each        Equipment being ordered: Reclining manual w/c with bilateral elevating leg rests.   Refills:  0        * order for DME   Quantity:  1 each        Equipment being ordered: Hospital Bed, fully electric.   Refills:  0        * order for DME   Quantity:  1 each        Equipment being ordered: Wheelchair, manual.   Refills:  0        * order for DME   Quantity:  1 each        Equipment being ordered: Wheelchair, manual, with elevated leg rests and  tilt in space back.  Please fax to [facility]; I called them 11/26/16 and they requested the new order.  Face to face is in my 10/26/16 note.   Refills:  0        * order for DME   Quantity:  2 each        Equipment being ordered: Cushioned heel boots.   Refills:  0        * order for DME   Quantity:  2 each        Bilateral heel protective cushioning boots.  Dx: previous stroke with left hemiplegia.  Duration of need: 99 months.   Refills:  0        order for DME   Quantity:  1 each        Equipment being ordered: Nebulizer   Refills:  11        oxyCODONE IR 5 MG tablet   Commonly known as:  ROXICODONE   Dose:  5 mg   Quantity:  60 tablet        Take 1 tablet (5 mg) by mouth every 4 hours as needed for moderate to severe pain   Refills:  0        pantoprazole 20 MG EC tablet   Commonly known as:  PROTONIX   Dose:  20 mg   Quantity:  30 tablet        Take 1 tablet (20 mg) by mouth every morning (before breakfast)   Refills:  11        senna-docusate 8.6-50 MG per tablet   Commonly known as:  SENEXON-S   Dose:  2 tablet   Quantity:  60 tablet        Take 2 tablets by mouth 2 times daily as needed for constipation   Refills:  1        simethicone 80 MG chewable tablet   Commonly known as:  MYLICON   Dose:  80 mg   Quantity:  180 tablet        Take 1 tablet (80 mg) by mouth 4 times daily   Refills:  5        tamsulosin 0.4 MG capsule   Commonly known as:  FLOMAX   Dose:  0.8 mg   Quantity:  60 capsule        Take 2 capsules (0.8 mg) by mouth daily   Refills:  11        thiamine 100 MG tablet   Dose:  100 mg   Quantity:  90 tablet        Take 1 tablet (100 mg) by mouth daily   Refills:  3        TOPROL XL PO   Dose:  50 mg        Take 50 mg by mouth daily   Refills:  0        warfarin 1 MG tablet   Commonly known as:  COUMADIN   Dose:  3 mg   Quantity:  120 tablet        Take 3 tablets (3 mg) by mouth daily Or as directed after INR dosing changes   Refills:  11        * Notice:  This list has 9 medication(s) that are the  "same as other medications prescribed for you. Read the directions carefully, and ask your doctor or other care provider to review them with you.            Procedures and tests performed during your visit     XR Abdomen 1 View      Orders Needing Specimen Collection     None      Pending Results     Date and Time Order Name Status Description    2018 1723 XR Abdomen 1 View Preliminary             Pending Culture Results     No orders found from 2018 to 2018.            Pending Results Instructions     If you had any lab results that were not finalized at the time of your Discharge, you can call the ED Lab Result RN at [phone]. You will be contacted by this team for any positive Lab results or changes in treatment. The nurses are available 7 days a week from 10A to 6:30P.  You can leave a message 24 hours per day and they will return your call.        Thank you for choosing [facility]       Thank you for choosing [facility] for your care. Our goal is always to provide you with excellent care. Hearing back from our patients is one way we can continue to improve our services. Please take a few minutes to complete the written survey that you may receive in the mail after you visit with us. Thank you!        [employer] Information     [employer] lets you send messages to your doctor, view your test results, renew your prescriptions, schedule appointments and more. To sign up, go to [email or web address] . Click on \"Log in\" on the left side of the screen, which will take you to the Welcome page. Then click on \"Sign up Now\" on the right side of the page.     You will be asked to enter the access code listed below, as well as some personal information. Please follow the directions to create your username and password.     Your access code is: [email or web address]  Expires: 2018  3:27 PM     Your access code will  in 90 days. If you need help or a new code, please call your [facility] clinic or [phone].   "      Care EveryWhere ID     This is your Care EveryWhere ID. This could be used by other organizations to access your [facility] medical records  [id number]        Equal Access to Services     ALCON SKINNER : Rocky Rahman, jeana morrison, otto rose, willie quiñonez. So [facility] [phone].    ATENCIÓN: Si habla español, tiene a smith disposición servicios gratuitos de asistencia lingüística. Llame al [phone].    We comply with applicable federal civil rights laws and [place] laws. We do not discriminate on the basis of race, color, national origin, age, disability, sex, sexual orientation, or gender identity.            After Visit Summary       This is your record. Keep this with you and show to your community pharmacist(s) and doctor(s) at your next visit.

## 2018-05-17 NOTE — ED PROVIDER NOTES
"  History     Chief Complaint   Patient presents with     LVAD concern     HPI  Sohan Rendon is a 68 year old male who presents to ER with alarms going off on his LVAD.  Patient presents with his daughter and son.  Patient has had the LVAD for several years.  No reports of fever or chest pain syncope neurological changes etc.  No trauma reported.  Today the alarm deep 6 times daughter concerned brought patient here to ER patient states he feels fine otherwise.  No other complaint is noted.  LVAD nurse coordinator notified.    I have reviewed the Medications, Allergies, Past Medical and Surgical History, and Social History in the Epic system.    Review of Systems   Constitutional: Negative for activity change, appetite change, chills and fever.   HENT: Negative for congestion.    Eyes: Negative for redness.   Respiratory: Negative for shortness of breath.    Cardiovascular: Negative for chest pain.   Gastrointestinal: Positive for abdominal pain.        Some mild tenderness with the driveline goes in   Genitourinary: Negative for difficulty urinating.   Musculoskeletal: Negative for arthralgias and neck stiffness.   Skin: Negative for color change.   Neurological: Positive for weakness. Negative for headaches.        Chronic weakness noted   Hematological: Bruises/bleeds easily.        The patient is on warfarin   Psychiatric/Behavioral: Negative for confusion, decreased concentration and dysphoric mood.   All other systems reviewed and are negative.      Physical Exam   BP: 93/76  Pulse: 63  Temp: 98.1  F (36.7  C)  Resp: 18  Height: 172.7 cm (5' 8\")  Weight: 86.2 kg (190 lb)  SpO2: 100 %      Physical Exam   Constitutional: He is oriented to person, place, and time. He appears well-developed and well-nourished. No distress.   Patient with family no distress.  LVAD is monitor noted.   HENT:   Head: Normocephalic and atraumatic.   Eyes: EOM are normal. Pupils are equal, round, and reactive to light. No scleral " icterus.   Neck: Normal range of motion. Neck supple.   Pulmonary/Chest: No respiratory distress.   Abdominal: He exhibits no distension. There is tenderness.   Minimal tenderness driveline insertion but no drainage noted no swelling.   Musculoskeletal: He exhibits no tenderness or deformity.   Neurological: He is alert and oriented to person, place, and time. He has normal reflexes.   Skin: Skin is warm and dry. No rash noted. He is not diaphoretic. No erythema. No pallor.   Psychiatric: He has a normal mood and affect. His behavior is normal. Judgment and thought content normal.   Nursing note and vitals reviewed.      ED Course     ED Course     Procedures        Evaluation of patient ER we did have the LVAD nurse coronary coming in to see the patient.  Evaluation interrogation patient's device did reveal a couple slightly lower flows in the 7000 but nothing significant.  Concern of possibly a cable malfunction.  They did exchange for a new one.  Patient otherwise been doing fine here without any alarms beeping.  KUB x-ray done reveals no dysfunction or sign of any caval abnormality of the LVAD device.  Patient sent home feeling fine will follow-up tomorrow on patient.  Call LVAD coordinator if any problems at all family agrees with plan patient and discharge.    Critical Care time:  none             Labs Ordered and Resulted from Time of ED Arrival Up to the Time of Departure from the ED - No data to display  Results for orders placed or performed during the hospital encounter of 05/16/18   XR Abdomen 1 View    Narrative    Exam: XR ABDOMEN 1 VW, 5/16/2018 6:38 PM    Indication: please do Drive Line evaluation of LVAD;     Comparison: Radiograph of the abdomen 5/10/2017    Findings:   Single AP view the abdomen. A left ventricular assist device projects  in stable position. The visible portion of the tract line is intact.  Partially imaged cardiac device lead. Visualized median wires are  intact. Double-J  biliary stent projects over the common bile duct in  adequate position. No portal venous gas. No dilated loops of bowel. No  abnormal calcifications within the abdomen.  Comment stool burden, most pronounced in the right colon.      Impression    Impression: The visualized drive line to the left ventricular assist  device appears to be intact.    I have personally reviewed the examination and initial interpretation  and I agree with the findings.    HUNTER SEPULVEDA MD     *Note: Due to a large number of results and/or encounters for the requested time period, some results have not been displayed. A complete set of results can be found in Results Review.            Assessments & Plan (with Medical Decision Making)  68-year-old male history of long-term use LVAD came in today because it was alarming ×6.  Patient had no other symptoms with this no fever etc.  Presented for evaluation he symptoms to monitor and is working fine at this point in interrogation of the device by the coordinator here showed a couple areas of may be slightly decreased flow/power the 7000s.  Interrogation showed no other abnormalities.  X-ray appeared normal in the ER today.  The exchange new cable patient doing fine discharged home will monitor symptoms call elevate coronary for any problems they will contact patient tomorrow family agrees with plan of discharge           I have reviewed the nursing notes.    I have reviewed the findings, diagnosis, plan and need for follow up with the patient.    Discharge Medication List as of 5/16/2018  7:06 PM      START taking these medications    Details   STATIN NOT PRESCRIBED, INTENTIONAL, Reason Statin was Not Prescribed: Heart failure             Final diagnoses:   LVAD (left ventricular assist device) present (H)       5/16/2018   [facility], EMERGENCY DEPARTMENT      This note was created at least in part by the use of dragon voice dictation system. Inadvertent typographical errors may still  exist.  Jose Roberto Underwood MD.         Jose Roberto Underwood MD  05/16/18 [id number]

## 2018-05-17 NOTE — PROGRESS NOTES
"D: Pt presented to ED yesterday with chirping from his LVAD controller. He was found to have several low speed advisories; lowest speed 7600. He was asymptomatic with the alarms. Waveforms were sent to [employer] for evaluation. Per "[employer] engineers, pt does not have anything wrong with his VAD equipment. It is not the driveline or any of the power sources. Speed drops are likely due to the known clot pt has in his pump. This clot is now causing enough drag on the rotor to cause speed drops.   I: Spoke with both of pt's daughters, Almaz and Antionette. Explained the alarms. Instructed them to call on-call VAD Coordinator if he has any further alarms. They were instructed to bring him to the ED only if he is symptomatic. We discussed options of palliative or hospice care to help them. Both declined, stating they will take care of him at home.   A: Both daughters were understanding of the situation. Almaz became tearful and stated, \"we know he is sick, we are just trying to stay strong for him\".   P: Will continue to monitor closely. Pt is being rescheduled to see Dr. Lemons in the near future to discuss goals of care.    "

## 2018-05-17 NOTE — PROGRESS NOTES
"Indication of Interrogation:  VAD alarms: chirping while connected to PM    Type of VAD:  Heartmate 2    Current Parameters:  Flow= \"---\" and \"+++\", Speed= 8800 rpm, Power= 5.4-7.7 capps, PI (if applicable)= 4-4.8    Abnormal Alarm on History:  Yes, explain: Pt has several \"low speed operation\" alarms. Speeds below low speed limit, but generally >8000 RPMs. There is one speed at 7900 and one at 7600.     Abnormal Events/Parameters Notes:  No    Changes Made during Interrogation:  Yes, explain: Pt was given a new patient cable (SN: [id number]). Waveforms obtained and sent into [facility] for evaluation. There were xrays taken of pt's driveline. If waveforms indicate driveline fracture will plan to send xray images to [facility] for further evaluation. Pt's daughter was instructed to call on-call VAD Coordinator if he had any more alarms. Per Dr. Lemons, if he is asymptomatic he is to stay home and await evaluation from [employer], likely tomorrow. If he is symptomatic he should return to the ED. Pt's daughter verbalized understanding.     "

## 2018-05-18 NOTE — MR AVS SNAPSHOT
[facility]   5/18/2018   Anticoagulation Therapy Visit    Description:  68 year old male   Provider:  Blanca Bah, RN   Department:  [facility]           INR as of 5/18/2018     Today's INR 2.1      Anticoagulation Summary as of 5/18/2018     INR goal    Prior goal 1.8-2.5   Today's INR 2.1   Full instructions 5/18: 3 mg; 5/19: 3 mg; 5/20: 3 mg; 5/21: 3 mg   Next INR check 5/22/2018    Indications   LVAD (left ventricular assist device) present [Z95.811]  Long-term (current) use of anticoagulants [Z79.01] [Z79.01]         May 2018 Details    Sun Mon Tue Wed Thu Fri Sat       1               2               3               4               5                 6               7               8               9               10               11               12                 13               14               15               16               17               18      3 mg   See details      19      3 mg           20      3 mg         21      3 mg         22            23               24               25               26                 27               28               29               30               31                  Date Details   05/18 This INR check       Date of next INR:  5/22/2018         How to take your warfarin dose     To take:  3 mg Take 1 of the 3 mg tablets.

## 2018-05-18 NOTE — PROGRESS NOTES
ANTICOAGULATION FOLLOW-UP CLINIC VISIT    Patient Name:  Sohan Rendon  Date:  5/18/2018  Contact Type:  Telephone    SUBJECTIVE:        OBJECTIVE    INR   Date Value Ref Range Status   05/18/2018 2.1  Final     Chromogenic Factor 10   Date Value Ref Range Status   08/10/2014 24 (L) 70 - 130 % Final     Comment:     Therapeutic Range:  A Chromogenic Factor 10 level of approximately 20-40%   inversely correlates with an INR of 2-3 for patients receiving Warfarin.   Chromogenic Factor 10 levels below 20% indicate an INR greater than 3 and   levels above 40% indicate an INR less than 2.       Factor 2 Assay   Date Value Ref Range Status   07/21/2014 25 (L) 60 - 140 % Final     Comment:     Analyte Specific Reagents (ASRs) are used in many laboratory tests necessary   for   standard medical care and generally do not require FDA approval.  This test   was   developed and its performance characteristics determined by [facility].  It has not been cleared or approved by   the US Food and Drug Administration.         ASSESSMENT / PLAN  No question data found.  Anticoagulation Summary as of 5/18/2018     INR goal    Prior goal 1.8-2.5   Today's INR 2.1   Maintenance plan No maintenance plan   Full instructions 5/18: 3 mg; 5/19: 3 mg; 5/20: 3 mg; 5/21: 3 mg   Weekly total 25.5 mg   Plan last modified Blanca Bah RN (4/5/2018)   Next INR check 5/22/2018   Priority INR   Target end date Indefinite    Indications   LVAD (left ventricular assist device) present [Z95.811]  Long-term (current) use of anticoagulants [Z79.01] [Z79.01]         Anticoagulation Episode Summary     INR check location     Preferred lab     Send INR reminders to [facility]    Comments Goal Range is 1.8-2.3  [facility] comes out to draw INR  Yancy @ [phone]  Daughter Almaz Ph [phone]      Anticoagulation Care Providers     Provider Role Specialty Phone number    Cristo  Evon Aranda MD [facility] [phone]            See the Encounter Report to view Anticoagulation Flowsheet and Dosing Calendar (Go to Encounters tab in chart review, and find the Anticoagulation Therapy Visit)    Spoke with Lyly MCKINNON.    Blanca Bah RN

## 2018-05-22 NOTE — PROGRESS NOTES
ANTICOAGULATION FOLLOW-UP CLINIC VISIT    Patient Name:  Sohan Rendon  Date:  5/22/2018  Contact Type:  Telephone    SUBJECTIVE:        OBJECTIVE    INR   Date Value Ref Range Status   05/22/2018 2.2  Final     Comment:     Home care INR     Chromogenic Factor 10   Date Value Ref Range Status   08/10/2014 24 (L) 70 - 130 % Final     Comment:     Therapeutic Range:  A Chromogenic Factor 10 level of approximately 20-40%   inversely correlates with an INR of 2-3 for patients receiving Warfarin.   Chromogenic Factor 10 levels below 20% indicate an INR greater than 3 and   levels above 40% indicate an INR less than 2.       Factor 2 Assay   Date Value Ref Range Status   07/21/2014 25 (L) 60 - 140 % Final     Comment:     Analyte Specific Reagents (ASRs) are used in many laboratory tests necessary   for   standard medical care and generally do not require FDA approval.  This test   was   developed and its performance characteristics determined by [facility].  It has not been cleared or approved by   the US Food and Drug Administration.         ASSESSMENT / PLAN  INR assessment THER    Recheck INR In: 8 DAYS    INR Location Homecare INR      Anticoagulation Summary as of 5/22/2018     INR goal    Prior goal 1.8-2.5   Today's INR 2.2   Maintenance plan No maintenance plan   Full instructions 5/22: 3 mg; 5/23: 3 mg; 5/24: 3 mg; 5/25: 3 mg; 5/26: 3 mg; 5/27: 3 mg; 5/28: 3 mg; 5/29: 3 mg   Weekly total 25.5 mg   Plan last modified Blanca Bah RN (4/5/2018)   Next INR check 5/30/2018   Priority INR   Target end date Indefinite    Indications   LVAD (left ventricular assist device) present [Z95.811]  Long-term (current) use of anticoagulants [Z79.01] [Z79.01]         Anticoagulation Episode Summary     INR check location     Preferred lab     Send INR reminders to [facility]    Comments Goal Range is 1.8-2.3  [facility] comes out to draw INR  Yancy @  [phone]  Daughter Almaz Santiago [phone]      Anticoagulation Care Providers     Provider Role Specialty Phone number    Evon Lemons MD Responsible Cardiology [phone]            See the Encounter Report to view Anticoagulation Flowsheet and Dosing Calendar (Go to Encounters tab in chart review, and find the Anticoagulation Therapy Visit)    Spoke with patient's home care nurse. Gave them their lab results and new warfarin recommendation.  No changes in health, medication, or diet. No missed doses, no falls. No signs or symptoms of bleed or clotting.    James Nickerson, RN

## 2018-05-22 NOTE — MR AVS SNAPSHOT
[facility]   5/22/2018   Anticoagulation Therapy Visit    Description:  68 year old male   Provider:  James Nickerson   Department:  [facility]           INR as of 5/22/2018     Today's INR 2.2      Anticoagulation Summary as of 5/22/2018     INR goal    Prior goal 1.8-2.5   Today's INR 2.2   Full instructions 5/22: 3 mg; 5/23: 3 mg; 5/24: 3 mg; 5/25: 3 mg; 5/26: 3 mg; 5/27: 3 mg; 5/28: 3 mg; 5/29: 3 mg   Next INR check 5/30/2018    Indications   LVAD (left ventricular assist device) present [Z95.811]  Long-term (current) use of anticoagulants [Z79.01] [Z79.01]         May 2018 Details    Sun Mon Tue Wed Thu Fri Sat       1               2               3               4               5                 6               7               8               9               10               11               12                 13               14               15               16               17               18               19                 20               21               22      3 mg   See details      23      3 mg         24      3 mg         25      3 mg         26      3 mg           27      3 mg         28      3 mg         29      3 mg         30            31                  Date Details   05/22 This INR check       Date of next INR:  5/30/2018         How to take your warfarin dose     To take:  3 mg Take 1 of the 3 mg tablets.

## 2018-05-30 NOTE — PROGRESS NOTES
ANTICOAGULATION FOLLOW-UP CLINIC VISIT    Patient Name:  Sohan Rendon  Date:  5/30/2018  Contact Type:  Telephone    SUBJECTIVE:     Patient Findings     Positives Med error    Comments [facility] for [facility] suspects that pt accidentally been taking 1mg daily due to being D/C'd from the hospital with 1mg tablets and the daughters are use to giving pt 3mg tablets. Pt is back on ASA and instructions per LVAD protocol to increase ASA to 325mg daily            OBJECTIVE    INR   Date Value Ref Range Status   05/30/2018 1.30  Final     Comment:     Home Care      Chromogenic Factor 10   Date Value Ref Range Status   08/10/2014 24 (L) 70 - 130 % Final     Comment:     Therapeutic Range:  A Chromogenic Factor 10 level of approximately 20-40%   inversely correlates with an INR of 2-3 for patients receiving Warfarin.   Chromogenic Factor 10 levels below 20% indicate an INR greater than 3 and   levels above 40% indicate an INR less than 2.       Factor 2 Assay   Date Value Ref Range Status   07/21/2014 25 (L) 60 - 140 % Final     Comment:     Analyte Specific Reagents (ASRs) are used in many laboratory tests necessary   for   standard medical care and generally do not require FDA approval.  This test   was   developed and its performance characteristics determined by [facility].  It has not been cleared or approved by   the US Food and Drug Administration.         ASSESSMENT / PLAN  INR assessment SUB    Recheck INR In: 2 DAYS    INR Location [facility] INR      Anticoagulation Summary as of 5/30/2018     INR goal    Prior goal 1.8-2.5   Today's INR 1.30!   Warfarin maintenance plan No maintenance plan   Full warfarin instructions 5/30: 5 mg; 5/31: 5 mg   Weekly warfarin total 25.5 mg   Plan last modified Blanca Bah RN (4/5/2018)   Next INR check 6/1/2018   Priority INR   Target end date Indefinite    Indications   LVAD (left ventricular assist device)  present [Z95.811]  Long-term (current) use of anticoagulants [Z79.01] [Z79.01]         Anticoagulation Episode Summary     INR check location     Preferred lab     Send INR reminders to [facility]    Comments Goal Range is 1.8-2.3 5/14/18 Restarted ASA 81mg Daily   [facility] comes out to draw INR  Yancy @ [phone]  Daughter Almaz  [phone]      Anticoagulation Care Providers     Provider Role Specialty Phone number    Evon Lemons MD Responsible Cardiology [phone]            See the Encounter Report to view Anticoagulation Flowsheet and Dosing Calendar (Go to Encounters tab in chart review, and find the Anticoagulation Therapy Visit)    Spoke with INO Connelly. Gave them new warfarin recommendation.  No changes in health, medication, or diet. No missed doses, no falls. No signs or symptoms of bleed or clotting.     Sarah Martinez RN             5/31/18 ADDENDUM  Spoke to patient's daughter.  Patient was seen for alarms in the ER today.   An INR was drawn.  Did not change recommendation or date for next INR. Instructed daughter to give 5mg tonight.  Will check an INR at home visit tomorrow.      INR 5/31  Was 1.45.  As above recommendation. TANYA Nickerson RN

## 2018-05-30 NOTE — MR AVS SNAPSHOT
[facility]ohan [facility]   5/30/2018   Anticoagulation Therapy Visit    Description:  68 year old male   Provider:  Sarah Martinez, RN   Department:  [facility]           INR as of 5/30/2018     Today's INR 1.30!      Anticoagulation Summary as of 5/30/2018     INR goal    Prior goal 1.8-2.5   Today's INR 1.30!   Full warfarin instructions 5/30: 5 mg; 5/31: 5 mg   Next INR check 6/1/2018    Indications   LVAD (left ventricular assist device) present [Z95.811]  Long-term (current) use of anticoagulants [Z79.01] [Z79.01]         May 2018 Details    Sun Mon Tue Wed Thu Fri Sat       1               2               3               4               5                 6               7               8               9               10               11               12                 13               14               15               16               17               18               19                 20               21               22               23               24               25               26                 27               28               29               30      5 mg   See details      31      5 mg            Date Details   05/30 This INR check               How to take your warfarin dose     To take:  5 mg Take 5 of the 1 mg tablets.           June 2018 Details    Sun Mon Tue Wed Thu Fri Sat          1            2                 3               4               5               6               7               8               9                 10               11               12               13               14               15               16                 17               18               19               20               21               22               23                 24               25               26               27               28               29               30                Date Details   No additional details    Date of next INR:  6/1/2018

## 2018-05-30 NOTE — TELEPHONE ENCOUNTER
"Request from family for visit. Reported \"not doing well\". Unable to see today. Encouraged to call home care nurse to visit then connect with our [facility] day call line after visit. See recent EPIC notes. Patient has thrombus in LVAD, is throwing clots to head and has worsening renal function. Cardiology and myself have recommended palliative care/hospice as there is no more interventions to offer. If goal is to support patient dying at home then palliative care at home will be best option as not sure process of enrolling in hospice will work for this family.  "

## 2018-05-31 NOTE — ED AVS SNAPSHOT
[facility], Emergency Department    [address] [address]    Phone:  [phone]                                       Sohan Rendon   MRN: [id number]    Department:  [facility], Emergency Department   Date of Visit:  5/31/2018           Patient Information     Date Of Birth          1950        Your diagnoses for this visit were:     Yeast dermatitis     Complication involving left ventricular assist device (LVAD), initial encounter        You were seen by Yancy Roger MD.        Discharge Instructions       Please call your VAD coordinator to get new base unit.      Begin nystatin cream in groin for skin irritation.  Avoid itching that area.    If you have any new redness, warmth, fevers or other concerns, return to the emergency department for re-evaluation.        Your next 10 appointments already scheduled     Jun 08, 2018 12:00 PM CDT   Lab with [facility] LAB   [facility] ([facility])    [address] [place] [address]  1st Floor  [facility] [address] [phone]            Jun 08, 2018 12:30 PM CDT   (Arrive by 12:15 PM)   Ventricular Assist Device with CLAIRE Escobar CNP   [facility] ([facility])    [address] [place] [address]  [address] [place] [id number] [phone]            Jun 13, 2018 10:00 AM CDT   (Arrive by 9:45 AM)   Ventricular Assist Device with Evon Lemons MD   [facility] ([facility])    [address] [place] [address]  [address] [place] [address] [phone]            Jun 22, 2018 10:00 AM CDT   Home Follow Up with Raghu Almodovar MD   [facility] ([facility])    [address]  [address]  [facility] [id number] [phone]              24 Hour Appointment Hotline       To make an appointment at any [facility] clinic, call [phone] ([phone]). If you don't have a family doctor or clinic, we will help you find  one. [facility] are conveniently located to serve the needs of you and your family.             Review of your medicines      START taking        Dose / Directions Last dose taken    nystatin-triamcinolone cream   Commonly known as:  MYCOLOG II   Dose:  1 applicator   Quantity:  20 g        Apply 1 g topically 2 times daily for 10 days   Refills:  0          Our records show that you are taking the medicines listed below. If these are incorrect, please call your family doctor or clinic.        Dose / Directions Last dose taken    acetaminophen 325 MG tablet   Commonly known as:  TYLENOL   Dose:  650 mg   Quantity:  100 tablet        Take 2 tablets (650 mg) by mouth every 6 hours as needed for mild pain   Refills:  0        aspirin 81 MG tablet   Dose:  81 mg   Quantity:  30 tablet        Take 1 tablet (81 mg) by mouth daily   Refills:  3        ALEK CONTOUR test strip   Quantity:  100 strip   Generic drug:  blood glucose monitoring        USE TO TEST BLOOD SUGAR 2 TIMES DAILY OR AS DIRECTED.   Refills:  2        bisacodyl 10 MG Suppository   Commonly known as:  DULCOLAX   Dose:  10 mg   Quantity:  5 suppository        Place 1 suppository (10 mg) rectally daily as needed for constipation   Refills:  1        * blood glucose monitoring lancets   Quantity:  1 Box        Use to test blood sugars 2 times daily or as directed.   Refills:  3        * blood glucose monitoring lancets   Quantity:  100 each        USE TO TEST BLOOD SUGAR 2 TIMES DAILY OR AS DIRECTED   Refills:  2        finasteride 5 MG tablet   Commonly known as:  PROSCAR   Dose:  5 mg   Quantity:  90 tablet        Take 1 tablet (5 mg) by mouth daily   Refills:  3        furosemide 20 MG tablet   Commonly known as:  LASIX   Dose:  20 mg   Quantity:  20 tablet        Take 1 tablet (20 mg) by mouth as needed   Refills:  11        ipratropium - albuterol 0.5 mg/2.5 mg/3 mL 0.5-2.5 (3) MG/3ML neb solution   Commonly known as:  DUONEB   Dose:  1 vial    Quantity:  360 mL        Take 1 vial (3 mLs) by nebulization every 6 hours as needed for shortness of breath / dyspnea, wheezing or other   Refills:  11        ketotifen 0.025 % Soln ophthalmic solution   Commonly known as:  ZADITOR/REFRESH ANTI-ITCH   Dose:  1 drop   Quantity:  1 Bottle        Place 1 drop into both eyes 2 times daily   Refills:  11        levETIRAcetam 100 MG/ML solution   Commonly known as:  KEPPRA   Dose:  750 mg   Quantity:  500 mL        Take 7.5 mLs (750 mg) by mouth every 12 hours   Refills:  3        loratadine 10 MG tablet   Commonly known as:  CLARITIN   Quantity:  90 tablet        TAKE 1 TABLET (10 MG) BY MOUTH DAILY   Refills:  2        losartan 25 MG tablet   Commonly known as:  COZAAR   Dose:  25 mg   Quantity:  15 tablet        Take 1 tablet (25 mg) by mouth daily   Refills:  3        Melatonin 10 MG Tabs tablet   Dose:  10 mg   Quantity:  30 tablet        Take 1 tablet (10 mg) by mouth At Bedtime   Refills:  11        nitroGLYcerin 0.4 MG sublingual tablet   Commonly known as:  NITROSTAT   Dose:  0.4 mg   Quantity:  30 tablet        Place 1 tablet (0.4 mg) under the tongue every 5 minutes as needed for chest pain   Refills:  1        order for DME   Quantity:  1 Device        Equipment being ordered: Lift chair.  Please see indications and face-to-face encounter details in 2/3/15 Office Visit note.   Refills:  0        * order for DME   Quantity:  2 each        Equipment being ordered: Bilateral leg supports for manual wheelchair.   Refills:  0        * order for DME   Quantity:  1 each        Equipment being ordered: Reclining manual w/c with bilateral elevating leg rests.   Refills:  0        * order for DME   Quantity:  1 each        Equipment being ordered: Hospital Bed, fully electric.   Refills:  0        * order for DME   Quantity:  1 each        Equipment being ordered: Wheelchair, manual.   Refills:  0        * order for DME   Quantity:  1 each        Equipment being  ordered: Wheelchair, manual, with elevated leg rests and tilt in space back.  Please fax to [facility]; I called them 11/26/16 and they requested the new order.  Face to face is in my 10/26/16 note.   Refills:  0        * order for DME   Quantity:  2 each        Equipment being ordered: Cushioned heel boots.   Refills:  0        * order for DME   Quantity:  2 each        Bilateral heel protective cushioning boots.  Dx: previous stroke with left hemiplegia.  Duration of need: 99 months.   Refills:  0        order for DME   Quantity:  1 each        Equipment being ordered: Nebulizer   Refills:  11        oxyCODONE IR 5 MG tablet   Commonly known as:  ROXICODONE   Dose:  5 mg   Quantity:  60 tablet        Take 1 tablet (5 mg) by mouth every 4 hours as needed for moderate to severe pain   Refills:  0        pantoprazole 20 MG EC tablet   Commonly known as:  PROTONIX   Dose:  20 mg   Quantity:  30 tablet        Take 1 tablet (20 mg) by mouth every morning (before breakfast)   Refills:  11        senna-docusate 8.6-50 MG per tablet   Commonly known as:  SENEXON-S   Dose:  2 tablet   Quantity:  60 tablet        Take 2 tablets by mouth 2 times daily as needed for constipation   Refills:  1        simethicone 80 MG chewable tablet   Commonly known as:  MYLICON   Dose:  80 mg   Quantity:  180 tablet        Take 1 tablet (80 mg) by mouth 4 times daily   Refills:  5        tamsulosin 0.4 MG capsule   Commonly known as:  FLOMAX   Dose:  0.8 mg   Quantity:  60 capsule        Take 2 capsules (0.8 mg) by mouth daily   Refills:  11        thiamine 100 MG tablet   Dose:  100 mg   Quantity:  90 tablet        Take 1 tablet (100 mg) by mouth daily   Refills:  3        TOPROL XL PO   Dose:  50 mg        Take 50 mg by mouth daily   Refills:  0        warfarin 1 MG tablet   Commonly known as:  COUMADIN   Dose:  3 mg   Quantity:  120 tablet        Take 3 tablets (3 mg) by mouth daily Or as directed after INR dosing changes   Refills:  11      "   * Notice:  This list has 9 medication(s) that are the same as other medications prescribed for you. Read the directions carefully, and ask your doctor or other care provider to review them with you.            Prescriptions were sent or printed at these locations (1 Prescription)                   Other Prescriptions                Printed at Department/Unit printer (1 of 1)         nystatin-triamcinolone (MYCOLOG II) cream                Procedures and tests performed during your visit     Procedure/Test Number of Times Performed    CBC with platelets differential 1    CT Head w/o Contrast 1    Comprehensive metabolic panel 1    EKG 12-lead, tracing only 1    INR 1    Lactate Dehydrogenase 2    UA with Microscopic 1      Orders Needing Specimen Collection     None      Pending Results     No orders found from 5/29/2018 to 6/1/2018.            Pending Culture Results     No orders found from 5/29/2018 to 6/1/2018.            Pending Results Instructions     If you had any lab results that were not finalized at the time of your Discharge, you can call the ED Lab Result RN at [phone]. You will be contacted by this team for any positive Lab results or changes in treatment. The nurses are available 7 days a week from 10A to 6:30P.  You can leave a message 24 hours per day and they will return your call.        Thank you for choosing [facility]       Thank you for choosing [facility] for your care. Our goal is always to provide you with excellent care. Hearing back from our patients is one way we can continue to improve our services. Please take a few minutes to complete the written survey that you may receive in the mail after you visit with us. Thank you!        [employer] Information     [employer] lets you send messages to your doctor, view your test results, renew your prescriptions, schedule appointments and more. To sign up, go to [email or web address] . Click on \"Log in\" on the left side of the screen, which " "will take you to the Welcome page. Then click on \"Sign up Now\" on the right side of the page.     You will be asked to enter the access code listed below, as well as some personal information. Please follow the directions to create your username and password.     Your access code is: [email or web address]  Expires: 2018  3:27 PM     Your access code will  in 90 days. If you need help or a new code, please call your [facility] clinic or [phone].        Care EveryWhere ID     This is your Care EveryWhere ID. This could be used by other organizations to access your [facility] medical records  [id number]        Equal Access to Services     ALCON SKINNER : Rocky Rahman, jeana morrison, otto rose, willie quiñonez. So [facility] [phone].    ATENCIÓN: Si habla español, tiene a smith disposición servicios gratuitos de asistencia lingüística. Llame al [phone].    We comply with applicable federal civil rights laws and [place] laws. We do not discriminate on the basis of race, color, national origin, age, disability, sex, sexual orientation, or gender identity.            After Visit Summary       This is your record. Keep this with you and show to your community pharmacist(s) and doctor(s) at your next visit.                  "

## 2018-05-31 NOTE — ED AVS SNAPSHOT
[facility], [place], Emergency Department    [address] [address]    Phone:  [phone]                                       Sohan Rendon   MRN: [id number]    Department:  [facility], Emergency Department   Date of Visit:  5/31/2018           After Visit Summary Signature Page     I have received my discharge instructions, and my questions have been answered. I have discussed any challenges I see with this plan with the nurse or doctor.    ..........................................................................................................................................  Patient/Patient Representative Signature      ..........................................................................................................................................  Patient Representative Print Name and Relationship to Patient    ..................................................               ................................................  Date                                            Time    ..........................................................................................................................................  Reviewed by Signature/Title    ...................................................              ..............................................  Date                                                            Time

## 2018-05-31 NOTE — ED TRIAGE NOTES
Pt arrived from home because his LVAD was beeping and he has a headache. His family also reports he has been itching since Thursday. Family is here translating and says they already spoke with the LVAD coordinator.

## 2018-05-31 NOTE — ED PROVIDER NOTES
History     Chief Complaint   Patient presents with     Vascular Access Problem     Headache     Pruritis     HPI  Sohan Rendon is a 68 year old male with a history of congestive heart failure and coronary artery disease status post LVAD placement for destination therapy, dementia, prior CVA with residual left-sided weakness who presents for alarming LVAD.  Family reports this evening around 2 AM the LVAD alarms twice.  This prompted their visit to the emergency department.  Per report they called 911 and then the LVAD coordinator.  The LVAD coordinator reports that he has a known clot within the LVAD which causes frequent alarms and was recommended not to come to the emergency department as there was nothing to do for this.  Family had already called 911 and came anyway.  In the emergency department the machine does continue to alarm even when disconnected from the LVAD.  He has not been complaining of any chest pain or shortness of breath.  He has reported a headache this evening and received Tylenol but continues to have an ongoing headache.  He has not had any recent falls or head trauma.  Family denies any fevers, nausea or vomiting.  He has baseline left-sided weakness without any new focal deficits.  In addition family notes that he has had significant itching of the intertriginous zone on the right.  Family has used topical hydrocortisone cream without improvement.  He has had similar episodes in the past but itching seems to be more intense now.      I have reviewed the Medications, Allergies, Past Medical and Surgical History, and Social History in the Epic system.    Review of Systems   Constitutional: Negative for fever.   Respiratory: Negative for cough and shortness of breath.    Cardiovascular: Negative for chest pain.   Gastrointestinal: Negative for abdominal pain, diarrhea, nausea and vomiting.   Genitourinary: Negative for discharge, dysuria, scrotal swelling and testicular pain.   Skin:  Positive for rash.   Neurological: Positive for weakness (left sided baseline) and headaches.   All other systems reviewed and are negative.      Physical Exam   BP: 102/77  Pulse: 59  Heart Rate: 61  Temp: 98.5  F (36.9  C)  Resp: (!) 6  Weight: 89.3 kg (196 lb 13.9 oz)  SpO2: 100 %      Physical Exam   General: patient is alert, slow to respond and confused at baseline (known dementia)    Head: atraumatic and normocephalic   EENT:tacky mucus membranes, pupils 2mm equal round and reactive, sclera anicteric  Neck: supple   Cardiovascular: regular rate and rhythm, extremities warm and well perfused, no lower extremity edema  Pulmonary: lungs clear to auscultation bilaterally   Abdomen: soft, non-tender, non-distended, no tenderness or erythema over driveline  Musculoskeletal: no gross deformity  Neurological: alert, slow to respond and difficulty answering questions, left upper and lower extremity weakness at baseline, no facial droop or slurring of speech, no drift in right upper or lower extremities  Skin: erythema along right intertriginous area with excoriations and small area of peeling of skin near excoriations, area is not warm or indurated, no vesicular lesions, no scrotal redness, no warmth or swelling      ED Course     ED Course     Procedures             EKG Interpretation:      Interpreted by Yancy Roger  Time reviewed: 0430  Symptoms at time of EKG: none   Rhythm: 1st degree AV block   Rate: normal  Axis: left  Ectopy: none  Conduction: normal  ST Segments/ T Waves: No ST-T wave changes  Q Waves: non-specific  Comparison to prior: Unchanged    Clinical Impression: normal EKG          Critical Care time:  none             Labs Ordered and Resulted from Time of ED Arrival Up to the Time of Departure from the ED - No data to display         Assessments & Plan (with Medical Decision Making)   Mr. Rendon is a 68 year old male with a history of congestive heart failure and coronary artery disease  status post LVAD placement for destination therapy, dementia, prior CVA with residual left-sided weakness who presents for alarming LVAD.  Patient's LVAD was reviewed by LVAD coordinator and did not actually alarm.  The base unit itself was beeping however may have not been plugged in.   Given his ongoing headache and baseline confusion in the setting of dementia he did go for a CT to rule out intracranial hemorrhage which is negative.   Labs including CBC, CMP, INR and LDH are at his baseline, low INR of 1.45.  No further work-up needed for LVAD at this time. The area in groin is suggestive of yeast infection.  He does not have signs of cellulitis at this time.  There is no evidence of guerrero's infection or scrotal involvement. He was given nystatin cream for yeast infection in intertriginous zone.  UA negative.  Will plan to discharge to home with outpatient follow-up.        I have reviewed the nursing notes.    I have reviewed the findings, diagnosis, plan and need for follow up with the patient.    New Prescriptions    NYSTATIN-TRIAMCINOLONE (MYCOLOG II) CREAM    Apply 1 g topically 2 times daily for 10 days       Final diagnoses:   Yeast dermatitis   Complication involving left ventricular assist device (LVAD), initial encounter       5/31/2018   [facility], [place], EMERGENCY DEPARTMENT     Yancy Roger MD  05/31/18 [id number]       Yancy Roger MD  05/31/18 2077

## 2018-05-31 NOTE — DISCHARGE INSTRUCTIONS
Please call your VAD coordinator to get new base unit.      Begin nystatin cream in groin for skin irritation.  Avoid itching that area.    If you have any new redness, warmth, fevers or other concerns, return to the emergency department for re-evaluation.

## 2018-06-01 NOTE — PROGRESS NOTES
ANTICOAGULATION FOLLOW-UP CLINIC VISIT    Patient Name:  Sohan Rendon  Date:  6/1/2018  Contact Type:  Telephone    SUBJECTIVE:     Patient Findings     Comments Patient will continue with ASA 325mg daily.            OBJECTIVE    INR   Date Value Ref Range Status   06/01/2018 1.5  Final     Chromogenic Factor 10   Date Value Ref Range Status   08/10/2014 24 (L) 70 - 130 % Final     Comment:     Therapeutic Range:  A Chromogenic Factor 10 level of approximately 20-40%   inversely correlates with an INR of 2-3 for patients receiving Warfarin.   Chromogenic Factor 10 levels below 20% indicate an INR greater than 3 and   levels above 40% indicate an INR less than 2.       Factor 2 Assay   Date Value Ref Range Status   07/21/2014 25 (L) 60 - 140 % Final     Comment:     Analyte Specific Reagents (ASRs) are used in many laboratory tests necessary   for   standard medical care and generally do not require FDA approval.  This test   was   developed and its performance characteristics determined by [facility].  It has not been cleared or approved by   the [place] Food and Drug Administration.         ASSESSMENT / PLAN  No question data found.  Anticoagulation Summary as of 6/1/2018     INR goal    Prior goal 1.8-2.5   Today's INR 1.5!   Warfarin maintenance plan No maintenance plan   Full warfarin instructions 6/1: 3 mg; 6/2: 3 mg; 6/3: 3 mg   Weekly warfarin total 25.5 mg   Plan last modified Blanca Bah RN (4/5/2018)   Next INR check 6/4/2018   Priority INR   Target end date Indefinite    Indications   LVAD (left ventricular assist device) present [Z95.811]  Long-term (current) use of anticoagulants [Z79.01] [Z79.01]         Anticoagulation Episode Summary     INR check location     Preferred lab     Send INR reminders to [facility]    Comments Goal Range is 1.8-2.3 5/14/18 Restarted ASA 81mg Daily   [facility] comes out to draw INR  Yancy @  [phone]  Daughter Almaz  [phone]      Anticoagulation Care Providers     Provider Role Specialty Phone number    Evon Lemons MD Responsible Cardiology [phone]            See the Encounter Report to view Anticoagulation Flowsheet and Dosing Calendar (Go to Encounters tab in chart review, and find the Anticoagulation Therapy Visit)    Spoke with Terry MCKINNON.    Blanca Bah RN

## 2018-06-04 NOTE — MR AVS SNAPSHOT
Sohan Rendon   6/4/2018   Anticoagulation Therapy Visit    Description:  68 year old male   Provider:  Amy Godinez, RN   Department:  [facility]           INR as of 6/4/2018     Today's INR 1.8      Anticoagulation Summary as of 6/4/2018     INR goal    Prior goal 1.8-2.5   Today's INR 1.8   Full warfarin instructions 6/4: 3 mg; 6/5: 2 mg; 6/6: 3 mg   Next INR check 6/7/2018    Indications   LVAD (left ventricular assist device) present [Z95.811]  Long-term (current) use of anticoagulants [Z79.01] [Z79.01]         June 2018 Details    Sun Mon Tue Wed Thu Fri Sat          1               2                 3               4      3 mg   See details      5      2 mg         6      3 mg         7            8               9                 10               11               12               13               14               15               16                 17               18               19               20               21               22               23                 24               25               26               27               28               29               30                Date Details   06/04 This INR check       Date of next INR:  6/7/2018         How to take your warfarin dose     To take:  2 mg Take 2 of the 1 mg tablets.    To take:  3 mg Take 1 of the 3 mg tablets.

## 2018-06-04 NOTE — PROGRESS NOTES
ANTICOAGULATION FOLLOW-UP CLINIC VISIT    Patient Name:  Sohan Rendon  Date:  6/4/2018  Contact Type:  Telephone    SUBJECTIVE:     Patient Findings     Comments ASA back to 81 mg daily per LVAD protocol            OBJECTIVE    INR Protime   Date Value Ref Range Status   06/04/2018 1.8 (A) 0.86 - 1.14 Final     Chromogenic Factor 10   Date Value Ref Range Status   08/10/2014 24 (L) 70 - 130 % Final     Comment:     Therapeutic Range:  A Chromogenic Factor 10 level of approximately 20-40%   inversely correlates with an INR of 2-3 for patients receiving Warfarin.   Chromogenic Factor 10 levels below 20% indicate an INR greater than 3 and   levels above 40% indicate an INR less than 2.       Factor 2 Assay   Date Value Ref Range Status   07/21/2014 25 (L) 60 - 140 % Final     Comment:     Analyte Specific Reagents (ASRs) are used in many laboratory tests necessary   for   standard medical care and generally do not require FDA approval.  This test   was   developed and its performance characteristics determined by [facility].  It has not been cleared or approved by   the [place] Food and Drug Administration.         ASSESSMENT / PLAN  INR assessment THER    Recheck INR In: 3 DAYS    INR Location [facility]      Anticoagulation Summary as of 6/4/2018     INR goal    Prior goal 1.8-2.5   Today's INR 1.8   Warfarin maintenance plan No maintenance plan   Full warfarin instructions 6/4: 3 mg; 6/5: 2 mg; 6/6: 3 mg   Weekly warfarin total 25.5 mg   Plan last modified Blanca Bah RN (4/5/2018)   Next INR check 6/7/2018   Priority INR   Target end date Indefinite    Indications   LVAD (left ventricular assist device) present [Z95.811]  Long-term (current) use of anticoagulants [Z79.01] [Z79.01]         Anticoagulation Episode Summary     INR check location     Preferred lab     Send INR reminders to [facility]    Comments Goal Range is 1.8-2.3 5/14/18 Restarted ASA  81mg Daily   [facility] comes out to draw INR  Yancy @ [phone]  Daughter Almaz Ph [phone]      Anticoagulation Care Providers     Provider Role Specialty Phone number    Evon Lemons MD Responsible Cardiology [phone]            See the Encounter Report to view Anticoagulation Flowsheet and Dosing Calendar (Go to Encounters tab in chart review, and find the Anticoagulation Therapy Visit)    Spoke with patient's home care nurse Zenia. Gave them their new warfarin recommendation.  No changes in health, medication, or diet. No missed doses, no falls. No signs or symptoms of bleed or clotting.      Amy Godinez RN

## 2018-06-06 NOTE — IP AVS SNAPSHOT
[address] [address]    Phone:  [phone]                                       After Visit Summary   6/6/2018    Sohan Rendon    MRN: [id number]           After Visit Summary Signature Page     I have received my discharge instructions, and my questions have been answered. I have discussed any challenges I see with this plan with the nurse or doctor.    ..........................................................................................................................................  Patient/Patient Representative Signature      ..........................................................................................................................................  Patient Representative Print Name and Relationship to Patient    ..................................................               ................................................  Date                                            Time    ..........................................................................................................................................  Reviewed by Signature/Title    ...................................................              ..............................................  Date                                                            Time

## 2018-06-06 NOTE — IP AVS SNAPSHOT
MRN:[id number]                      After Visit Summary   6/6/2018    Sohan Rendon    MRN: [id number]           Thank you!     Thank you for choosing [facility] for your care. Our goal is always to provide you with excellent care. Hearing back from our patients is one way we can continue to improve our services. Please take a few minutes to complete the written survey that you may receive in the mail after you visit with us. Thank you!        Patient Information     Date Of Birth          1950        Designated Caregiver       Most Recent Value    Caregiver    Will someone help with your care after discharge? yes    Name of designated caregiver Alber     Phone number of caregiver     Caregiver address See contacts      About your hospital stay     You were admitted on:  June 7, 2018 You last received care in the:  [address] [facility]    You were discharged on:  June 22, 2018        Reason for your hospital stay       MCA stroke  He came to the hospital because of a stroke and was more sleepy could not speak well and could not eat well. We placed a feeding tube through his nose to feed him. We treated a pneumonia and he is finishing antibiotics for a urinary infection. He should be re evaluated for removing the feeding tube and determining if his lasix and metoprolol should be restarted.                  Who to Call     For medical emergencies, please call 911.  For non-urgent questions about your medical care, please call your primary care provider or clinic, [phone]          Attending Provider     Provider Specialty    Damaris Perez MD Emergency Medicine    Streib, Sebastian Spaulding MD Neurology    [facility], Bhavik Aguero MD Neurology       Primary Care Provider Office Phone # Fax #    CLAIRE Rosas [phone]      After Care Instructions     Activity       Your activity upon discharge: activity as tolerated            Diet       Follow  this diet upon discharge: Orders Placed This Encounter      Calorie Counts      Adult Formula Drip Feeding: Specified Time Nepro; Nasoduodenal tube; Goal Rate: Nepro at 85 mL/hr x 14 hours (6p-8a or timing per pt preference) - See advancement schedule below; mL/hr; Medication - Tube Feeding Flush Frequency: 150ml q4h; Amount ...      Dysphagia Diet Level 1 Pureed Nectar Thickened Liquids (pre-thickened or use instant food thickener)            Monitor and record       Meals eaten  weight                  Follow-up Appointments     Adult [facility] Follow-up and recommended labs and tests       Follow up with [facility] in the next month.     Appointments on [place] and/or [facility] (with [facility] provider or service). Call [phone] if you haven't heard regarding these appointments within 7 days of discharge.                  Additional Services     Home Care OT Referral for Hospital Discharge       OT to eval and treat    Your provider has ordered home care - occupational therapy. If you have not been contacted within 2 days of your discharge please call the department phone number listed on the top of this document.            Home care nursing referral       [facility] Care #[phone]  Resume skilled nursing visits  Monitor cardiac, neuro and resp status  Monitor hydration, nutrition and bowel status  Instruct Family in tube feeding administration  Instruct in medication administration and evaluate effects  INR draw per orders        Your provider has ordered home care nursing services. If you have not been contacted within 2 days of your discharge please call the inpatient department phone number at [phone] .            Home infusion referral       Your provider has referred you to: [facility] #[phone]  Nepro via Nasoduodenal tube at 85ml/hr x 14hrs  (6pm-8am)  Water flush 150ml q 4hrs    Monitor ability to wean off of tube feedings if PO intake is adequate             "Medication Therapy Management Referral       MTM referral reason            Patient had a hospital or ED visit in last 6 months and has more than 10   PTA or Discharge medications    Patient has 5 PTA or Discharge Medications AND one of the following   diagnoses: DM,HF,COPD,AMI DX,PULM HTN       This service is designed to help you get the most from your medications.  A specially trained pharmacist will work closely with you and your doctors  to solve any problems related to your medications and to help you get the   best results from taking them.      The Medication Therapy Management staff will call you to schedule an appointment.            Speech Therapy Referral       *This therapy referral will be filtered to a centralized scheduling office at [facility] and the patient will receive a call to schedule an appointment at a [place] location most convenient for them. *     [facility] provides Speech Therapy evaluation and treatment and many specialty services across the [place] system.  If requesting a specialty program, please choose from the list below.  If you have not heard from the scheduling office within 2 business days, please call [phone] for all locations, with the exception of [place], please call [phone] and [facility], please call [phone]      Treatment: Evaluation & Treatment  Speech Treatment Diagnosis: Aphasia    Dysphagia  Special Instructions: evaluate and advance feeding  Special Programs: Clinical Swallow Study  Feeding Clinic (SLP and OT and Dietary Evaluate & Treat) ([facility] location only)    Please be aware that coverage of these services is subject to the terms and limitations of your health insurance plan.  Call member services at your health plan with any benefit or coverage questions.      **Note to Provider:  If you are referring outside of [place] for the therapy appointment, please list the name of the location in the \"special " "instructions\" above, print the referral and give to the patient to schedule the appointment.                  Future tests that were ordered for you     INR                 Pending Results     Date and Time Order Name Status Description    2018 0015 AFB Culture Non Blood Preliminary     2018 2200 Sodium In process     6/15/2018 0722 Blood culture Preliminary             Statement of Approval     Ordered          18 1145  I have reviewed and agree with all the recommendations and orders detailed in this document.  EFFECTIVE NOW     Approved and electronically signed by:  Jesusita Kaiser MD             Admission Information     Date & Time Provider Department Dept. Phone    2018 Bhavik Blackmon MD [address] [facility] [place] [phone]      Your Vitals Were     Blood Pressure Pulse Temperature Respirations Height Weight    99/78 (BP Location: Left arm) 95 98.9  F (37.2  C) (Oral) 20 1.829 m (6') 84.4 kg (186 lb 1.1 oz)    Pulse Oximetry BMI (Body Mass Index)                95% 25.24 kg/m2          [employer] Information     [employer] lets you send messages to your doctor, view your test results, renew your prescriptions, schedule appointments and more. To sign up, go to [email or web address] . Click on \"Log in\" on the left side of the screen, which will take you to the Welcome page. Then click on \"Sign up Now\" on the right side of the page.     You will be asked to enter the access code listed below, as well as some personal information. Please follow the directions to create your username and password.     Your access code is: [email or web address]  Expires: 2018  3:38 PM     Your access code will  in 90 days. If you need help or a new code, please call your [place] clinic or [phone].        Care EveryWhere ID     This is your Care EveryWhere ID. This could be used by other organizations to access your [place] medical records  [id number]        Equal Access to Services     " [facility]: Hadii aad ku jolene Solillieali, waaxda luqadaha, qaybta kaalmada adeegyada, willie lilliein hayaan brittanyshaggy cabrales lakia . So [facility] [phone].    ATENCIÓN: Si habla español, tiene a smith disposición servicios gratuitos de asistencia lingüística. Llame al [phone].    We comply with applicable federal civil rights laws and [place] laws. We do not discriminate on the basis of race, color, national origin, age, disability, sex, sexual orientation, or gender identity.               Review of your medicines      START taking        Dose / Directions    acetaminophen 32 mg/mL solution   Commonly known as:  TYLENOL   Used for:  Aphasia   Replaces:  acetaminophen 325 MG tablet        Dose:  650 mg   20.3 mLs (650 mg) by Oral or Feeding Tube route every 6 hours as needed for mild pain   Quantity:  300 mL   Refills:  3       aspirin 10 mg/mL Susp   Used for:  Cerebrovascular accident (CVA), unspecified mechanism (H)   Replaces:  aspirin 81 MG tablet        Dose:  81 mg   Start taking on:  6/23/2018   8.1 mLs (81 mg) by Oral or Feeding Tube route daily   Quantity:  400 mL   Refills:  3       insulin isophane human 100 UNIT/ML injection   Commonly known as:  HumuLIN N PEN   Used for:  LVAD (left ventricular assist device) present (H)        Dose:  88 Units   Inject 88 Units Subcutaneous every 24 hours   Quantity:  30 mL   Refills:  3       linezolid 600 MG tablet   Commonly known as:  ZYVOX   Indication:  Urinary Tract Infection   Used for:  Acute cystitis without hematuria        Dose:  600 mg   1 tablet (600 mg) by Oral or Feeding Tube route every 12 hours for 3 days   Quantity:  6 tablet   Refills:  0       loratadine 5 MG/5ML syrup   Commonly known as:  CLARITIN   Used for:  Aphasia   Replaces:  loratadine 10 MG tablet        Dose:  10 mg   Start taking on:  6/23/2018   10 mLs (10 mg) by Oral or Feeding Tube route daily   Quantity:  300 mL   Refills:  3       losartan 2.5 mg/mL Susp   Commonly known as:   COZAAR   Used for:  LVAD (left ventricular assist device) present (H)   Replaces:  losartan 25 MG tablet        Dose:  25 mg   Start taking on:  6/23/2018   10 mLs (25 mg) by Oral or Feeding Tube route daily   Quantity:  400 mL   Refills:  3       pantoprazole Susp suspension   Commonly known as:  PROTONIX   Used for:  Cerebrovascular accident (CVA), unspecified mechanism (H), LVAD (left ventricular assist device) present (H)   Replaces:  pantoprazole 20 MG EC tablet        Dose:  20 mg   10 mLs (20 mg) by Oral or Feeding Tube route 2 times daily   Quantity:  400 mL   Refills:  3       protein modular   Used for:  Cerebrovascular accident (CVA), unspecified mechanism (H)        Dose:  1 packet   1 packet by Per Feeding Tube route 3 times daily   Quantity:  90 packet   Refills:  3         CONTINUE these medicines which have NOT CHANGED        Dose / Directions    ALEK CONTOUR test strip   Used for:  Hypoglycemia   Generic drug:  blood glucose monitoring        USE TO TEST BLOOD SUGAR 2 TIMES DAILY OR AS DIRECTED.   Quantity:  100 strip   Refills:  2       bisacodyl 10 MG Suppository   Commonly known as:  DULCOLAX   Used for:  Drug-induced constipation        Dose:  10 mg   Place 1 suppository (10 mg) rectally daily as needed for constipation   Quantity:  5 suppository   Refills:  1       * blood glucose monitoring lancets   Used for:  Diabetes mellitus, type 2 (H)        Use to test blood sugars 2 times daily or as directed.   Quantity:  1 Box   Refills:  3       * blood glucose monitoring lancets   Used for:  Diabetes mellitus, type 2 (H)        USE TO TEST BLOOD SUGAR 2 TIMES DAILY OR AS DIRECTED   Quantity:  100 each   Refills:  2       levETIRAcetam 100 MG/ML solution   Commonly known as:  KEPPRA   Used for:  Seizure (H)        Dose:  750 mg   Take 7.5 mLs (750 mg) by mouth every 12 hours   Quantity:  500 mL   Refills:  3       Melatonin 10 MG Tabs tablet   Used for:  Insomnia due to medical condition         Dose:  10 mg   Take 1 tablet (10 mg) by mouth At Bedtime   Quantity:  30 tablet   Refills:  11       nitroGLYcerin 0.4 MG sublingual tablet   Commonly known as:  NITROSTAT   Used for:  Coronary artery disease involving native coronary artery of native heart with unstable angina pectoris (H)        Dose:  0.4 mg   Place 1 tablet (0.4 mg) under the tongue every 5 minutes as needed for chest pain   Quantity:  30 tablet   Refills:  1       order for DME   Used for:  Fracture of femoral neck, right, closed, initial encounter, Stroke (H), CHF (congestive heart failure), NYHA class IV (H)        Equipment being ordered: Lift chair.  Please see indications and face-to-face encounter details in 2/3/15 Office Visit note.   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   Quantity:  1 each   Refills:   0       * order for DME   Used for:  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), Displaced fracture of right femoral neck, closed, with nonunion, subsequent encounter        Equipment being ordered: Wheelchair, manual, with elevated leg rests and tilt in space back.  Please fax to [facility]; I called them 11/26/16 and they requested the new order.  Face to face is in my 10/26/16 note.   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   Quantity:  2 each   Refills:  0       * order for DME   Used for:  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)        Bilateral heel protective cushioning boots.  Dx: previous stroke with left hemiplegia.  Duration of need: 99 months.   Quantity:  2 each   Refills:  0       order for DME   Used for:  Pulmonary atelectasis        Equipment being ordered: Nebulizer   Quantity:  1 each   Refills:  11       senna-docusate 8.6-50 MG per tablet   Commonly known as:  SENOKOT-S;PERICOLACE        Dose:  1-2 tablet   Take 1-2 tablets by mouth 2 times daily as needed for constipation   Refills:  0       simethicone 80 MG chewable tablet   Commonly known as:  MYLICON        Dose:  80 mg   Take 80 mg by mouth every 6 hours as needed for flatulence or cramping   Refills:  0       tamsulosin 0.4 MG capsule   Commonly known as:  FLOMAX   Used for:  Incomplete bladder emptying        Dose:  0.8 mg   Take 2 capsules (0.8 mg) by mouth daily   Quantity:  60 capsule   Refills:  11       thiamine 100 MG tablet   Used for:  Cerebrovascular accident (CVA) due to embolism of right middle cerebral artery (H)        Dose:  100 mg   Take 1 tablet (100 mg) by mouth daily   Quantity:  90 tablet   Refills:  3       warfarin 1 MG tablet   Commonly known as:  COUMADIN   Used for:  LVAD (left ventricular assist device)  present (H)        Dose:  3 mg   Take 3 tablets (3 mg) by mouth daily Or as directed after INR dosing changes   Quantity:  120 tablet   Refills:  11       * Notice:  This list has 9 medication(s) that are the same as other medications prescribed for you. Read the directions carefully, and ask your doctor or other care provider to review them with you.      STOP taking     acetaminophen 325 MG tablet   Commonly known as:  TYLENOL   Replaced by:  acetaminophen 32 mg/mL solution           aspirin 81 MG tablet   Replaced by:  aspirin 10 mg/mL Susp           finasteride 5 MG tablet   Commonly known as:  PROSCAR           furosemide 20 MG tablet   Commonly known as:  LASIX           loratadine 10 MG tablet   Commonly known as:  CLARITIN   Replaced by:  loratadine 5 MG/5ML syrup           losartan 25 MG tablet   Commonly known as:  COZAAR   Replaced by:  losartan 2.5 mg/mL Susp           nystatin-triamcinolone cream   Commonly known as:  MYCOLOG II           pantoprazole 20 MG EC tablet   Commonly known as:  PROTONIX   Replaced by:  pantoprazole Susp suspension           TOPROL XL PO                Where to get your medicines      These medications were sent to [facility] [place], [place] - [address] [place] [id number]     Phone:  [phone]     acetaminophen 32 mg/mL solution    aspirin 10 mg/mL Susp    insulin isophane human 100 UNIT/ML injection    linezolid 600 MG tablet    loratadine 5 MG/5ML syrup    losartan 2.5 mg/mL Susp    pantoprazole Susp suspension    protein modular                Protect others around you: Learn how to safely use, store and throw away your medicines at www.disposemymeds.org.        ANTIBIOTIC INSTRUCTION     You've Been Prescribed an Antibiotic - Now What?  Your healthcare team thinks that you or your loved one might have an infection. Some infections can be treated with antibiotics, which are powerful, life-saving drugs. Like all  medications, antibiotics have side effects and should only be used when necessary. There are some important things you should know about your antibiotic treatment.      Your healthcare team may run tests before you start taking an antibiotic.    Your team may take samples (e.g., from your blood, urine or other areas) to run tests to look for bacteria. These test can be important to determine if you need an antibiotic at all and, if you do, which antibiotic will work best.      Within a few days, your healthcare team might change or even stop your antibiotic.    Your team may start you on an antibiotic while they are working to find out what is making you sick.    Your team might change your antibiotic because test results show that a different antibiotic would be better to treat your infection.    In some cases, once your team has more information, they learn that you do not need an antibiotic at all. They may find out that you don't have an infection, or that the antibiotic you're taking won't work against your infection. For example, an infection caused by a virus can't be treated with antibiotics. Staying on an antibiotic when you don't need it is more likely to be harmful than helpful.      You may experience side effects from your antibiotic.    Like all medications, antibiotics have side effects. Some of these can be serious.    Let you healthcare team know if you have any known allergies when you are admitted to the hospital.    One significant side effect of nearly all antibiotics is the risk of severe and sometimes deadly diarrhea caused by Clostridium difficile (C. Difficile). This occurs when a person takes antibiotics because some good germs are destroyed. Antibiotic use allows C. diificile to take over, putting patients at high risk for this serious infection.    As a patient or caregiver, it is important to understand your or your loved one's antibiotic treatment. It is especially important for  caregivers to speak up when patients can't speak for themselves. Here are some important questions to ask your healthcare team.    What infection is this antibiotic treating and how do you know I have that infection?    What side effects might occur from this antibiotic?    How long will I need to take this antibiotic?    Is it safe to take this antibiotic with other medications or supplements (e.g., vitamins) that I am taking?     Are there any special directions I need to know about taking this antibiotic? For example, should I take it with food?    How will I be monitored to know whether my infection is responding to the antibiotic?    What tests may help to make sure the right antibiotic is prescribed for me?      Information provided by:  www.cdc.gov/getsmart  U.S. Department of Health and Human Services  Centers for disease Control and Prevention  National Center for Emerging and Zoonotic Infectious Diseases  Division of Healthcare Quality Promotion             Medication List: This is a list of all your medications and when to take them. Check marks below indicate your daily home schedule. Keep this list as a reference.      Medications           Morning Afternoon Evening Bedtime As Needed    acetaminophen 32 mg/mL solution   Commonly known as:  TYLENOL   20.3 mLs (650 mg) by Oral or Feeding Tube route every 6 hours as needed for mild pain   Last time this was given:  650 mg on 6/21/2018  9:31 PM                                   aspirin 10 mg/mL Susp   8.1 mLs (81 mg) by Oral or Feeding Tube route daily   Start taking on:  6/23/2018   Last time this was given:  81 mg on 6/22/2018  9:10 AM                                   ALEK CONTOUR test strip   USE TO TEST BLOOD SUGAR 2 TIMES DAILY OR AS DIRECTED.   Generic drug:  blood glucose monitoring                                bisacodyl 10 MG Suppository   Commonly known as:  DULCOLAX   Place 1 suppository (10 mg) rectally daily as needed for constipation                                 * blood glucose monitoring lancets   Use to test blood sugars 2 times daily or as directed.                                * blood glucose monitoring lancets   USE TO TEST BLOOD SUGAR 2 TIMES DAILY OR AS DIRECTED                                insulin isophane human 100 UNIT/ML injection   Commonly known as:  HumuLIN N PEN   Inject 88 Units Subcutaneous every 24 hours   Last time this was given:  20 Units on 6/22/2018  9:09 AM                    Give at the start of tube feeds.   Do not give if holding tube feeds               levETIRAcetam 100 MG/ML solution   Commonly known as:  KEPPRA   Take 7.5 mLs (750 mg) by mouth every 12 hours   Last time this was given:  750 mg on 6/22/2018  9:10 AM                                      linezolid 600 MG tablet   Commonly known as:  ZYVOX   1 tablet (600 mg) by Oral or Feeding Tube route every 12 hours for 3 days   Last time this was given:  600 mg on 6/22/2018  1:04 PM                                      loratadine 5 MG/5ML syrup   Commonly known as:  CLARITIN   10 mLs (10 mg) by Oral or Feeding Tube route daily   Start taking on:  6/23/2018   Last time this was given:  10 mg on 6/22/2018  9:10 AM                                   losartan 2.5 mg/mL Susp   Commonly known as:  COZAAR   10 mLs (25 mg) by Oral or Feeding Tube route daily   Start taking on:  6/23/2018   Last time this was given:  12.5 mg on 6/22/2018  9:10 AM                                   Melatonin 10 MG Tabs tablet   Take 1 tablet (10 mg) by mouth At Bedtime   Last time this was given:  10 mg on 6/21/2018  8:42 PM                                   nitroGLYcerin 0.4 MG sublingual tablet   Commonly known as:  NITROSTAT   Place 1 tablet (0.4 mg) under the tongue every 5 minutes as needed for chest pain                                   order for DME   Equipment being ordered: Lift chair.  Please see indications and face-to-face encounter details in 2/3/15 Office Visit note.                                 * order for DME   Equipment being ordered: Bilateral leg supports for manual wheelchair.                                * order for DME   Equipment being ordered: Reclining manual w/c with bilateral elevating leg rests.                                * order for DME   Equipment being ordered: Hospital Bed, fully electric.                                * order for DME   Equipment being ordered: Wheelchair, manual.                                * order for DME   Equipment being ordered: Wheelchair, manual, with elevated leg rests and tilt in space back.  Please fax to [facility]; I called them 11/26/16 and they requested the new order.  Face to face is in my 10/26/16 note.                                * order for DME   Equipment being ordered: Cushioned heel boots.                                * order for DME   Bilateral heel protective cushioning boots.  Dx: previous stroke with left hemiplegia.  Duration of need: 99 months.                                order for DME   Equipment being ordered: Nebulizer                                pantoprazole Susp suspension   Commonly known as:  PROTONIX   10 mLs (20 mg) by Oral or Feeding Tube route 2 times daily   Last time this was given:  20 mg on 6/22/2018  9:10 AM                                      protein modular   1 packet by Per Feeding Tube route 3 times daily   Last time this was given:  1 packet on 6/22/2018  1:09 PM                                         senna-docusate 8.6-50 MG per tablet   Commonly known as:  SENOKOT-S;PERICOLACE   Take 1-2 tablets by mouth 2 times daily as needed for constipation   Last time this was given:  2 tablets on 6/8/2018  9:37 AM                                   simethicone 80 MG chewable tablet   Commonly known as:  MYLICON   Take 80 mg by mouth every 6 hours as needed for flatulence or cramping                                   tamsulosin 0.4 MG capsule   Commonly known as:  FLOMAX   Take 2 capsules (0.8 mg)  by mouth daily            Do NOT crush med. Pt will have to take orally if able.                        thiamine 100 MG tablet   Take 1 tablet (100 mg) by mouth daily   Last time this was given:  100 mg on 6/22/2018  9:11 AM                                   warfarin 1 MG tablet   Commonly known as:  COUMADIN   Take 3 tablets (3 mg) by mouth daily Or as directed after INR dosing changes   Last time this was given:  7 mg on 6/21/2018  6:30 PM                                   * Notice:  This list has 9 medication(s) that are the same as other medications prescribed for you. Read the directions carefully, and ask your doctor or other care provider to review them with you.

## 2018-06-07 PROBLEM — G93.40 ENCEPHALOPATHY: Status: ACTIVE | Noted: 2018-01-01

## 2018-06-07 NOTE — PHARMACY-ANTICOAGULATION SERVICE
Clinical Pharmacy - Warfarin Dosing Consult     Pharmacy has been consulted to manage this patient s warfarin therapy.  Indication: LVAD/RVAD  Therapy Goal: INR 1.8-2.0 (OP goal appears to be 1.8-2.5)  OP Anticoag Clinic: [facility]  Warfarin Prior to Admission: Yes  Warfarin PTA Regimen: Directed by INR clinic, 3 mg daily is the most recent  Significant drug interactions: aspirin  Recent documented change in oral intake/nutrition: Yes (currently NPO as of 6/6/18 PM)  Dose Comments: not on consistent maintenance regimen     INR   Date Value Ref Range Status   06/06/2018 1.94 (H) 0.86 - 1.14 Final     INR Point of Care   Date Value Ref Range Status   06/06/2018 2.0 (H) 0.86 - 1.14 Final     Chromogenic Factor 10   Date Value Ref Range Status   08/10/2014 24 (L) 70 - 130 % Final     Comment:     Therapeutic Range:  A Chromogenic Factor 10 level of approximately 20-40%   inversely correlates with an INR of 2-3 for patients receiving Warfarin.   Chromogenic Factor 10 levels below 20% indicate an INR greater than 3 and   levels above 40% indicate an INR less than 2.       Factor 2 Assay   Date Value Ref Range Status   07/21/2014 25 (L) 60 - 140 % Final     Comment:     Analyte Specific Reagents (ASRs) are used in many laboratory tests necessary   for   standard medical care and generally do not require FDA approval.  This test   was   developed and its performance characteristics determined by [facility].  It has not been cleared or approved by   the [place] Food and Drug Administration.            The patient is currently NPO. We will assess appropriateness for warfarin therapy later in the day pending SLP and modify plans according to the care team.   Pharmacy will monitor Sohan Rendon daily and order warfarin doses to achieve specified goal.      Please contact pharmacy as soon as possible if the warfarin needs to be held for a procedure or if the warfarin goals change.

## 2018-06-07 NOTE — ED PROVIDER NOTES
"  History     Chief Complaint   Patient presents with     Altered Mental Status     The history is provided by a relative and medical records. The history is limited by the condition of the patient (altered mental status).     Sohan Rendon is a 68 year old male with a history of right MCA stroke w/left hemiparesis (2013), anemia, BPH, CHF NYHA class IV s/p LVAD, CKD, right inferior cerebellary stroke (2014), GERD, HTN, MI who presents via EMS for evaluation of altered mental status. Patient is unable to provide history due to altered mental status. History is provided by the patient's children. Patient's children report the patient was at his baseline throughout the day today, however, at approximately 8:30 PM, thirty minutes prior to arrival, the patient had the sudden onset of altered mental status. They report the patient suddenly \"closed his mouth and stopped responding\". They state the patient also closed his eyes and would not open them. Additionally, patient's daughter notes the patient had had a runny nose throughout the day with some epistaxis. Patient does have baseline left-sided weakness secondary to a prior stroke. No recent travel.      I have reviewed the Medications, Allergies, Past Medical and Surgical History, and Social History in the [employer] system.  Past Medical History:   Diagnosis Date     Acute right MCA stroke (H) 6/2013    With L sided hemiparesis      Anemia of chronic disease     Baseline Hb 8-9     BPH (benign prostatic hyperplasia)      CHF (congestive heart failure), NYHA class IV (H) 10/9/2012    s/p HeartMate II.  Was on milrinone and dobutamine prior to LVAD      CKD (chronic kidney disease)     Baseline Cr=     Clostridium difficile colitis 12/2012      Dysphagia     PEG tube placed in 2012.  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  Normal carotid duplex 3/2014.       Esophagitis " 12/2012    EGD with esophagitis and multiple douenal ulcers     Fracture of femoral neck, right, closed (H) 2/3/2015    Presumed pathologic as patient is non-weight-bearing and suffered no trauma.  Family declined operative repair during hospital stay 1/23 - 1/30/15.     Gastric and duodenal angiodysplasia with hemorrhage 6/18/2015     GERD (gastroesophageal reflux disease)      Gout      Hematuria      HTN (hypertension)     LDL=59 (3/29/2014)     Hyperlipaemia      Ischemic cardiomyopathy      Mitral regurgitation     s/p MVR with Carbomedics ring      Myocardial infarction 1998    In [facility] without intervention      Nonsenile cataract     BE     PFO (patent foramen ovale)     s/p closure (10/9/2012)     TB lung, latent     s/p 9 months INH in 2012        Past Surgical History:   Procedure Laterality Date     C CABG, VEIN, FIVE  2001     CATARACT IOL, RT/LT Right 11/19/2015     ENDOSCOPIC RETROGRADE CHOLANGIOPANCREATOGRAM N/A 5/9/2017    Procedure: COMBINED ENDOSCOPIC RETROGRADE CHOLANGIOPANCREATOGRAPHY, PLACE TUBE/STENT;  Endoscopic Retrograde Cholangiopancreatogram, Stent (Zimmon Biliary 7fr 12cm) Placement;  Surgeon: Cristo Grove MD;  Location: [facility] OR     ESOPHAGOSCOPY, GASTROSCOPY, DUODENOSCOPY (EGD), COMBINED N/A 6/10/2015    Procedure: COMBINED ESOPHAGOSCOPY, GASTROSCOPY, DUODENOSCOPY (EGD);  Surgeon: Edu Jenkins MD;  Location: [facility] GI     ESOPHAGOSCOPY, GASTROSCOPY, DUODENOSCOPY (EGD), COMBINED N/A 6/15/2015    Procedure: COMBINED ESOPHAGOSCOPY, GASTROSCOPY, DUODENOSCOPY (EGD);  Surgeon: Yury Bonner MD;  Location: [facility] OR     H INSERT ICD  2/10/2011    And pacemaker.  Not BiV     INSERT VENTRICULAR ASSIST DEVICE LEFT (HEARTMATE II)  10/9/2012     IR GASTRO JEJUNOSTOMY TUBE PLACEMENT       PHACOEMULSIFICATION CLEAR CORNEA WITH STANDARD INTRAOCULAR LENS IMPLANT Right 11/19/2015    Procedure: PHACOEMULSIFICATION CLEAR CORNEA WITH STANDARD INTRAOCULAR LENS IMPLANT;  Surgeon: Violeta Cosme,  MD;  Location: [facility] OR     REPAIR PATENT FORAMEN OVALE  10/9/2012     REPAIR VALVE MITRAL  2/9/2012    28 mm Carbomedics 2/3 ring        Family History   Problem Relation Age of Onset     Family History Negative No family hx of      Glaucoma No family hx of      Macular Degeneration No family hx of      CANCER No family hx of      no skin cancer       Social History   Substance Use Topics     Smoking status: Former Smoker     Smokeless tobacco: Former User     Alcohol use No       No current facility-administered medications for this encounter.      Current Outpatient Prescriptions   Medication     acetaminophen (TYLENOL) 325 MG tablet     aspirin 81 MG tablet     ALEK CONTOUR test strip     bisacodyl (DULCOLAX) 10 MG Suppository     blood glucose monitoring (ALEK MICROLET) lancets     blood glucose monitoring (NO BRAND SPECIFIED) lancets     finasteride (PROSCAR) 5 MG tablet     furosemide (LASIX) 20 MG tablet     ipratropium - albuterol 0.5 mg/2.5 mg/3 mL (DUONEB) 0.5-2.5 (3) MG/3ML neb solution     ketotifen (ZADITOR/REFRESH ANTI-ITCH) 0.025 % SOLN ophthalmic solution     levETIRAcetam (KEPPRA) 100 MG/ML solution     loratadine (CLARITIN) 10 MG tablet     losartan (COZAAR) 25 MG tablet     Melatonin 10 MG TABS tablet     Metoprolol Succinate (TOPROL XL PO)     nitroGLYcerin (NITROSTAT) 0.4 MG sublingual tablet     nystatin-triamcinolone (MYCOLOG II) cream     order for DME     order for DME     order for DME     order for DME     order for DME     order for DME     order for DME     order for DME     ORDER FOR DME     oxyCODONE IR (ROXICODONE) 5 MG tablet     pantoprazole (PROTONIX) 20 MG EC tablet     senna-docusate (SENEXON-S) 8.6-50 MG per tablet     simethicone (MYLICON) 80 MG chewable tablet     tamsulosin (FLOMAX) 0.4 MG capsule     thiamine (VITAMIN B-1) 100 MG tablet     warfarin (COUMADIN) 1 MG tablet        Allergies   Allergen Reactions     Seasonal Allergies        Review of Systems   Unable to  perform ROS: Mental status change       Physical Exam   BP: 107/85  Heart Rate: 79  Temp: 97.5  F (36.4  C)  Resp: 16  SpO2: 97 %      Physical Exam   Constitutional: He appears well-developed.   Confused altered.  Not responding to verbal stimuli.  Moving his right arm and grabbing at other people's extremities.  Unable to follow commands.  Unable to track fully with his eyes.   HENT:   Head: Atraumatic.   Eyes: Conjunctivae and EOM are normal. Pupils are equal, round, and reactive to light.   Cardiovascular:   Mechanical LVAD sound.   Pulmonary/Chest: Effort normal and breath sounds normal. No respiratory distress. He has no wheezes. He has no rales.   Abdominal: Soft. He exhibits no distension. There is no tenderness. There is no rebound.   Musculoskeletal: He exhibits no edema or tenderness.   Neurological:   No movement of his left upper and left lower leg.  Normal range of motion of his right arm with normal strength.   Skin: Skin is warm. No rash noted.       ED Course     ED Course     Procedures       8:59 PM  The patient was seen and examined by Dr. Perez in Room 2.          EKG Interpretation:      Interpreted by Damaris Perez  Time reviewed: 2147  Symptoms at time of EKG: None   Rhythm: 1 degree AV block  Rate: Normal  Axis: Left Axis Deviation  Ectopy: none  Conduction: normal  ST Segments/ T Waves: No acute ischemic changes  Q Waves: III and aVf  Comparison to prior: Unchanged    Clinical Impression: no acute changes                Critical Care time:  was 60 minutes for this patient excluding procedures.     The patient has stroke symptoms:           ED Stroke specific documentation           NIHSS PDF          Protocol PDF     Patient last known well time: 8:30pm  ED Provider first to bedside at: 2105    Patient was not treated with TPA due to the following reason(s):  Anticoagulants and PT >1 5 or INR > 1.7    National Institutes of Health Stroke Scale (Baseline)  Time Performed: 2105       Score    Level of consciousness: (1)   Not alert; arousable w/ minor stim to obey/answer/respond    LOC questions: (2)   Answers neither question correctly    LOC commands: (2)   Performs neither task correctly    Best gaze: (0)   Normal    Visual: (0)   No visual loss    Facial palsy: (2)   Partial paralysis (total/near total of lower face)    Motor arm (left): (4)   No movement    Motor arm (right): (0)   No drift    Motor leg (left): (4)   No movement    Motor leg (right): (2)   Some effort against gravity    Limb ataxia: (0)   Absent    Sensory: (0)   Normal- no sensory loss    Best language: (2)   Severe aphasia    Dysarthria: (2)   Severe dysarthria    Extinction and inattention: (0)   No abnormality        Total Score:  21        Stroke Mimics were considered (including migraine headache, seizure disorder, hypoglycemia (or hyperglycemia), head or spinal trauma, CNS infection, Toxin ingestion and shock state (e.g. sepsis) .            Medications   0.9% sodium chloride BOLUS (0 mLs Intravenous Stopped 6/6/18 2211)   LORazepam (ATIVAN) injection 1 mg (1 mg Intravenous Given 6/6/18 2142)   LORazepam (ATIVAN) injection 1 mg (1 mg Intravenous Given 6/6/18 2153)   haloperidol lactate (HALDOL) injection 2 mg (2 mg Intravenous Given 6/6/18 2219)   iopamidol (ISOVUE-370) solution 75 mL (75 mLs Intravenous Given 6/6/18 2232)   sodium chloride 0.9 % bag 500mL for CT scan flush use (90 mLs As instructed Given 6/6/18 2232)                Labs Ordered and Resulted from Time of ED Arrival Up to the Time of Departure from the ED   CBC WITH PLATELETS DIFFERENTIAL - Abnormal; Notable for the following:        Result Value    RBC Count 3.01 (*)     Hemoglobin 8.5 (*)     Hematocrit 28.7 (*)     MCHC 29.6 (*)     RDW 23.1 (*)     All other components within normal limits   PARTIAL THROMBOPLASTIN TIME - Abnormal; Notable for the following:     PTT 51 (*)     All other components within normal limits   COMPREHENSIVE METABOLIC  PANEL - Abnormal; Notable for the following:     Glucose 153 (*)     Creatinine 2.14 (*)     GFR Estimate 31 (*)     GFR Estimate If Black 37 (*)     Calcium 7.9 (*)     Albumin 3.2 (*)     AST 50 (*)     All other components within normal limits   INR - Abnormal; Notable for the following:     INR 1.94 (*)     All other components within normal limits   ROUTINE UA WITH MICROSCOPIC REFLEX TO CULTURE - Abnormal; Notable for the following:     Specific Gravity Urine 1.002 (*)     Blood Urine Moderate (*)     Leukocyte Esterase Urine Small (*)     Bacteria Urine Few (*)     All other components within normal limits   TROPONIN I   GLUCOSE MONITOR NURSING POCT   ACTIVITY   PULSE OXIMETRY NURSING   NOTIFY   ISTAT INR NURSING POCT   ISTAT TROPONIN NURSING POCT   VITAL SIGNS AND NEURO CHECKS   ASSESSMENT            Assessments & Plan (with Medical Decision Making): Patient is a 68-year-old male with a history of LVAD and prior CVA with known left-sided hemiparesis who presents to the ER due to acute onset of altered mental status.  Family says that he was talking and interactive and all of a sudden became aphasic.  Patient has been moving his right arm, but unable to follow commands.  We called a stroke code after arrival the patient to the ER.  Patient had a CT head that was negative.  After giving 2 doses of Ativan and then giving IV Haldol we were able to obtain a CTA of the head.  CTA does show occlusion of the left sided vessel.  These results were discussed with the Neurology team.  Neurology is deciding whether they want to take the patient for an embolectomy or not.  Since the patient is on Coumadin he is not a candidate for TPA.  Patient will be admitted to the Neurology service for further care.  Critical care for this patient is 40 minutes.    Initially Neurology recommended Cards-2 admission but after Cards-2 recommended neurology primary they accepted the patient.        I have reviewed the nursing notes.    I  have reviewed the findings, diagnosis, plan and need for follow up with the patient.    New Prescriptions    No medications on file       Final diagnoses:   Cerebrovascular accident (CVA), unspecified mechanism (H)   Aphasia   Lisset BERRIOS, am serving as a trained medical scribe to document services personally performed by Damaris Perez MD, based on the provider's statements to me.   Damaris BERRIOS MD, was physically present and have reviewed and verified the accuracy of this note documented by Lisset Alexandre.    6/6/2018   [facility], [place], EMERGENCY DEPARTMENT     Damaris Perez MD  06/07/18 0055

## 2018-06-07 NOTE — PLAN OF CARE
Problem: Patient Care Overview  Goal: Plan of Care/Patient Progress Review  Outcome: No Change  A: Assumed cares at 0400, unable to assess orientation, pt withdrawing from pain with L sided weakness per baseline with L sided facial droop. Per family pt speech clearing from admission to ED but pt remains confused. VS unchanged on 2-3L NC, -120/60-70's. First AV block, 50-60's. Afebrile. TRACY pain and nausea. Strict NPO. HM2 no alarms, site CDI. Incontinent urine, BS hypoactive. MIVF @100mL/h, (stopped per staff recommendation). Plan to continue neuro q1h & vitals q2h.     Temp:  [97.5  F (36.4  C)-98  F (36.7  C)] 97.8  F (36.6  C)  Heart Rate:  [60-79] 65  Resp:  [10-16] 14  BP: ()/(72-91) 127/87  SpO2:  [94 %-100 %] 100 %     R: Continue with POC. Notify primary team with changes.

## 2018-06-07 NOTE — ED NOTES
[facility], [place]   ED Nurse to Floor Handoff     Sohan Rendon is a 68 year old male who speaks [place] and lives with family members,  in a home  They arrived in the ED by ambulance from home    ED Chief Complaint: Altered Mental Status    ED Dx;   Final diagnoses:   Cerebrovascular accident (CVA), unspecified mechanism (H)   Aphasia         Needed?: No    Allergies:   Allergies   Allergen Reactions     Seasonal Allergies    .  Past Medical Hx:   Past Medical History:   Diagnosis Date     Acute right MCA stroke (H) 6/2013    With L sided hemiparesis      Anemia of chronic disease     Baseline Hb 8-9     BPH (benign prostatic hyperplasia)      CHF (congestive heart failure), NYHA class IV (H) 10/9/2012    s/p HeartMate II.  Was on milrinone and dobutamine prior to LVAD      CKD (chronic kidney disease)     Baseline Cr=     Clostridium difficile colitis 12/2012      Dysphagia     PEG tube placed in 2012.  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  Normal carotid duplex 3/2014.       Esophagitis 12/2012    EGD with esophagitis and multiple douenal ulcers     Fracture of femoral neck, right, closed (H) 2/3/2015    Presumed pathologic as patient is non-weight-bearing and suffered no trauma.  Family declined operative repair during hospital stay 1/23 - 1/30/15.     Gastric and duodenal angiodysplasia with hemorrhage 6/18/2015     GERD (gastroesophageal reflux disease)      Gout      Hematuria      HTN (hypertension)     LDL=59 (3/29/2014)     Hyperlipaemia      Ischemic cardiomyopathy      Mitral regurgitation     s/p MVR with Carbomedics ring      Myocardial infarction 1998    In [facility] without intervention      Nonsenile cataract     BE     PFO (patent foramen ovale)     s/p closure (10/9/2012)     TB lung, latent     s/p 9 months INH in 2012       Baseline Mental status: severe  dementia and other Family reports that patient had sudden AMS @ 2010-squeezing eyes shut, not speaking to family  Current Mental Status changes: other lethargic, sleeping (haldol/ativan to obtain CT scan)    Infection present or suspected this encounter: no  Sepsis suspected: No  Isolation type: Contact     Activity level - Baseline/Home:  Total Care  Activity Level - Current:   Total Care    Bariatric equipment needed?: No    In the ED these meds were given:   Medications   0.9% sodium chloride BOLUS (0 mLs Intravenous Stopped 6/6/18 2211)   LORazepam (ATIVAN) injection 1 mg (1 mg Intravenous Given 6/6/18 2142)   LORazepam (ATIVAN) injection 1 mg (1 mg Intravenous Given 6/6/18 2153)   haloperidol lactate (HALDOL) injection 2 mg (2 mg Intravenous Given 6/6/18 2219)   iopamidol (ISOVUE-370) solution 75 mL (75 mLs Intravenous Given 6/6/18 2232)   sodium chloride 0.9 % bag 500mL for CT scan flush use (90 mLs As instructed Given 6/6/18 2232)       Drips running?  No    Home pump  No    Current LDAs  Peripheral IV 06/06/18 Right Lower forearm (Active)   Site Assessment WDL 6/6/2018  9:30 PM   Line Status Saline locked 6/6/2018  9:30 PM   Phlebitis Scale 0-->no symptoms 6/6/2018  9:30 PM   Infiltration Scale 0 6/6/2018  9:30 PM   Extravasation? No 6/6/2018  9:30 PM   Number of days:1       Wound 06/17/16 Mid Perineum Fistula small opening  (Active)   Number of days:720       Labs results:   Labs Ordered and Resulted from Time of ED Arrival Up to the Time of Departure from the ED   CBC WITH PLATELETS DIFFERENTIAL - Abnormal; Notable for the following:        Result Value    RBC Count 3.01 (*)     Hemoglobin 8.5 (*)     Hematocrit 28.7 (*)     MCHC 29.6 (*)     RDW 23.1 (*)     All other components within normal limits   PARTIAL THROMBOPLASTIN TIME - Abnormal; Notable for the following:     PTT 51 (*)     All other components within normal limits   COMPREHENSIVE METABOLIC PANEL - Abnormal; Notable for the following:      Glucose 153 (*)     Creatinine 2.14 (*)     GFR Estimate 31 (*)     GFR Estimate If Black 37 (*)     Calcium 7.9 (*)     Albumin 3.2 (*)     AST 50 (*)     All other components within normal limits   INR - Abnormal; Notable for the following:     INR 1.94 (*)     All other components within normal limits   ROUTINE UA WITH MICROSCOPIC REFLEX TO CULTURE - Abnormal; Notable for the following:     Specific Gravity Urine 1.002 (*)     Blood Urine Moderate (*)     Leukocyte Esterase Urine Small (*)     Bacteria Urine Few (*)     All other components within normal limits   TROPONIN I   GLUCOSE MONITOR NURSING POCT   ACTIVITY   PULSE OXIMETRY NURSING   NOTIFY   ISTAT INR NURSING POCT   ISTAT TROPONIN NURSING POCT   ASSESSMENT   NEURO CHECKS       Imaging Studies:   Recent Results (from the past 24 hour(s))   CT Head w/o Contrast    Narrative    CT HEAD W/O CONTRAST 6/6/2018 9:40 PM    History: acute onset of altered mental status;     Comparison: CT on 5/31/2018.    Technique: Using multidetector thin collimation helical acquisition  technique, axial, coronal and sagittal CT images from the skull base  to the vertex were obtained without intravenous contrast.     Findings: No significant change in extensive encephalomalacia  involving the right frontal, parietal, temporal lobes, left occipital  lobe and posterior aspect of the right cerebellar hemisphere. No  intracranial hemorrhage, mass effect, or midline shift. No  hydrocephalus. Patchy periventricular white matter hypodensities.  Diffuse cerebral volume loss more than expected for the age. The gray  to white matter differentiation of the cerebral hemispheres is  preserved. The basal cisterns are patent.    The visualized paranasal sinuses are clear. The mastoid air cells are  clear.       Impression    Impression:  1. No acute intracranial pathology.  2. Unchanged extensive encephalomalacia in the right middle cerebral  artery territory, left occipital lobe, and right  cerebellar lobe.  3. Unchanged small vessel ischemic disease and diffuse cerebral and  cerebellar volume loss more than expected for age.    I have personally reviewed the examination and initial interpretation  and I agree with the findings.    BABS OCAMPO MD       Recent vital signs:   /76  Temp 97.5  F (36.4  C) (Oral)  Resp 13  SpO2 100%    Cardiac Rhythm: Other  Pt needs tele? Yes  Skin/wound Issues: LVAD driveline sites CDI    Code Status: Full Code    Pain control: pt had none    Nausea control: pt had none    Abnormal labs/tests/findings requiring intervention: AMS, carotid artery occlusion    Family present during ED course? Yes   Family Comments/Social Situation comments: Large amount of family @ bedside and in waiting room, providing cares    Tasks needing completion: None    ascom-- [phone] [place] ED  [phone] [facility] ED

## 2018-06-07 NOTE — PHARMACY-CONSULT NOTE
Pharmacy Tube Feeding Consult    Medication reviewed for administration by feeding tube and for potential food/drug interactions.    Recommendation:   1) All ordered medications appropriate to be given via feeding tube. Since patient does not have oral access, his home levetiracetam was switched to IV.    Pharmacy will continue to follow as new medications are ordered.    Ced Amato, PharmD  PGY-2 Critical Care Pharmacy Resident

## 2018-06-07 NOTE — ED NOTES
Pt. Placed on portable monitor, AVSS on 2LPM NC, Float RN will bring to 6B in 10 mins. Paper chart on bed. Family and patient updated on plan.

## 2018-06-07 NOTE — ED NOTES
Bed: ED02  Expected date:   Expected time:   Means of arrival:   Comments:  H416 - 68M with AMS and LVAD - red

## 2018-06-07 NOTE — CONSULTS
Cardiology Heart Failure Consult         Date of Service (when I saw the patient): 06/07/18    Assessment and plan    This is a 67 yo M with a history of ICM s/p HM II in 2012 as destination therapy with a course complicated by hemolysis, pump thrombosis and multiple strokes (subcortical, R PICA, R MCA), PFO s/p closure in 2012,MVr with carbomedics ring ICD placement in 2011, latent TB, PFO s/p closure in 2012, CKD presenting with new altered mental status and aphasia CT showing large R MCA encephalomalacia and CTA showing occluded petrous L ICA of unclear chronicity. INR on admission was 2    - heart failure 2/2 ICM s/p HM II as DT in 2012  - Stage D NYHA class III  - complicated by hemolysis, suspected pump thrombosis with persistently elevated LDH and strokes admitted with new L ICA stroke.  -PTA cardiac meds are: metoprolol XL 50mg OD, losartan 25mg OD, asa, lasix 20mg PRN- will allow permissive hypertension per neuro protocol, when possible would resume PTA medications to obtain MAP's <80 sta  - no indication for troponin check or repeat echo since he has known pump thrombosis and is not a candidate for pump exchange or further therapies  - d/c normal saline   - Anticoagulated with coumadin pta (INR goal 1.8-2.3) INR on admission 2    Please don't hesitate to contact us with further questions or concerns, patient seen with Dr. Kathy Ayala   Cardiology fellow PGY-4    REASON FOR CONSULT: new stroke In the setting of VAD    History of Present Illness   Sohan Rendon is a 68 year old male with a history of ICM s/p HM II in 2012 as destination therapy with a course complicated by hemolysis, pump thrombosis and multiple strokes (subcortical, R PICA, R MCA), PFO s/p closure in 2012,MVr with carbomedics ring ICD placement in 2011, latent TB, PFO s/p closure in 2012, CKD,presenting with new altered mental status and aphasia CT showing large R MCA encephalomalacia and CTA showing occluded petrous  L ICA of unclear chronicity.     He had been recently seen in the ED for LVAD alarms was interrogated by the LVAD coordinator who did not see any acute alarms had a CT with no changes and was discharged. Yesterday his daughter notes he stopped speaking and was obtunded so she brought him to the ED.     Past Medical History    I have reviewed this patient's medical history and updated it with pertinent information if needed.   Past Medical History:   Diagnosis Date     Acute right MCA stroke (H) 6/2013    With L sided hemiparesis      Anemia of chronic disease     Baseline Hb 8-9     BPH (benign prostatic hyperplasia)      CHF (congestive heart failure), NYHA class IV (H) 10/9/2012    s/p HeartMate II.  Was on milrinone and dobutamine prior to LVAD      CKD (chronic kidney disease)     Baseline Cr=     Clostridium difficile colitis 12/2012      Dysphagia     PEG tube placed in 2012.  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  Normal carotid duplex 3/2014.       Esophagitis 12/2012    EGD with esophagitis and multiple douenal ulcers     Fracture of femoral neck, right, closed (H) 2/3/2015    Presumed pathologic as patient is non-weight-bearing and suffered no trauma.  Family declined operative repair during hospital stay 1/23 - 1/30/15.     Gastric and duodenal angiodysplasia with hemorrhage 6/18/2015     GERD (gastroesophageal reflux disease)      Gout      Hematuria      HTN (hypertension)     LDL=59 (3/29/2014)     Hyperlipaemia      Ischemic cardiomyopathy      Mitral regurgitation     s/p MVR with Carbomedics ring      Myocardial infarction 1998    In [facility] without intervention      Nonsenile cataract     BE     PFO (patent foramen ovale)     s/p closure (10/9/2012)     TB lung, latent     s/p 9 months INH in 2012        Past Surgical History   I have reviewed this patient's surgical history and updated it with  pertinent information if needed.  Past Surgical History:   Procedure Laterality Date     C CABG, VEIN, FIVE  2001     CATARACT IOL, RT/LT Right 11/19/2015     ENDOSCOPIC RETROGRADE CHOLANGIOPANCREATOGRAM N/A 5/9/2017    Procedure: COMBINED ENDOSCOPIC RETROGRADE CHOLANGIOPANCREATOGRAPHY, PLACE TUBE/STENT;  Endoscopic Retrograde Cholangiopancreatogram, Stent (Zimmon Biliary 7fr 12cm) Placement;  Surgeon: Cristo Grove MD;  Location: [facility] OR     ESOPHAGOSCOPY, GASTROSCOPY, DUODENOSCOPY (EGD), COMBINED N/A 6/10/2015    Procedure: COMBINED ESOPHAGOSCOPY, GASTROSCOPY, DUODENOSCOPY (EGD);  Surgeon: Edu Jenkins MD;  Location: [facility] GI     ESOPHAGOSCOPY, GASTROSCOPY, DUODENOSCOPY (EGD), COMBINED N/A 6/15/2015    Procedure: COMBINED ESOPHAGOSCOPY, GASTROSCOPY, DUODENOSCOPY (EGD);  Surgeon: Yury Bonner MD;  Location: [facility] OR     H INSERT ICD  2/10/2011    And pacemaker.  Not BiV     INSERT VENTRICULAR ASSIST DEVICE LEFT (HEARTMATE II)  10/9/2012     IR GASTRO JEJUNOSTOMY TUBE PLACEMENT       PHACOEMULSIFICATION CLEAR CORNEA WITH STANDARD INTRAOCULAR LENS IMPLANT Right 11/19/2015    Procedure: PHACOEMULSIFICATION CLEAR CORNEA WITH STANDARD INTRAOCULAR LENS IMPLANT;  Surgeon: Violeta Cosme MD;  Location: [facility] OR     REPAIR PATENT FORAMEN OVALE  10/9/2012     REPAIR VALVE MITRAL  2/9/2012    28 mm Carbomedics 2/3 ring        Prior to Admission Medications   Prior to Admission Medications   Prescriptions Last Dose Informant Patient Reported? Taking?   ALEK CONTOUR test strip   No No   Sig: USE TO TEST BLOOD SUGAR 2 TIMES DAILY OR AS DIRECTED.   Melatonin 10 MG TABS tablet 6/5/2018 at PM  No No   Sig: Take 1 tablet (10 mg) by mouth At Bedtime   Metoprolol Succinate (TOPROL XL PO) 6/6/2018 at AM Daughter Yes Yes   Sig: Take 50 mg by mouth daily    ORDER FOR DME   No No   Sig: Equipment being ordered: Lift chair.    Please see indications and face-to-face encounter details in 2/3/15 Office Visit note.    acetaminophen (TYLENOL) 325 MG tablet 6/6/2018 at 1300  Yes Yes   Sig: Take 2 tablets (650 mg) by mouth every 6 hours as needed for mild pain   aspirin 81 MG tablet 6/6/2018 at Unknown time  No Yes   Sig: Take 1 tablet (81 mg) by mouth daily   bisacodyl (DULCOLAX) 10 MG Suppository Past Week at Unknown time Daughter No Yes   Sig: Place 1 suppository (10 mg) rectally daily as needed for constipation   blood glucose monitoring (ALEK MICROLET) lancets   No No   Sig: USE TO TEST BLOOD SUGAR 2 TIMES DAILY OR AS DIRECTED   blood glucose monitoring (NO BRAND SPECIFIED) lancets   No No   Sig: Use to test blood sugars 2 times daily or as directed.   finasteride (PROSCAR) 5 MG tablet 6/6/2018 at AM Daughter No Yes   Sig: Take 1 tablet (5 mg) by mouth daily   furosemide (LASIX) 20 MG tablet 6/6/2018 at Unknown time Daughter No Yes   Sig: Take 1 tablet (20 mg) by mouth as needed   levETIRAcetam (KEPPRA) 100 MG/ML solution 6/6/2018 at AM  No Yes   Sig: Take 7.5 mLs (750 mg) by mouth every 12 hours   loratadine (CLARITIN) 10 MG tablet 6/6/2018 at Unknown time  No Yes   Sig: TAKE 1 TABLET (10 MG) BY MOUTH DAILY   losartan (COZAAR) 25 MG tablet 6/6/2018 at Unknown time  Yes Yes   Sig: Take 1 tablet (25 mg) by mouth daily   nitroGLYcerin (NITROSTAT) 0.4 MG sublingual tablet   No Yes   Sig: Place 1 tablet (0.4 mg) under the tongue every 5 minutes as needed for chest pain   nystatin-triamcinolone (MYCOLOG II) cream 6/5/2018  No No   Sig: Apply 1 g topically 2 times daily for 10 days   order for DME   No No   Sig: Equipment being ordered: Bilateral leg supports for manual wheelchair.   order for DME   No No   Sig: Equipment being ordered: Reclining manual w/c with bilateral elevating leg rests.   order for DME   No No   Sig: Equipment being ordered: Hospital Bed, fully electric.   order for DME   No No   Sig: Equipment being ordered: Wheelchair, manual.   order for DME   No No   Sig: Equipment being ordered: Wheelchair, manual, with  elevated leg rests and tilt in space back.  Please fax to [facility]; I called them 11/26/16 and they requested the new order.  Face to face is in my 10/26/16 note.   order for DME   No No   Sig: Equipment being ordered: Cushioned heel boots.   order for DME   No No   Sig: Bilateral heel protective cushioning boots.  Dx: previous stroke with left hemiplegia.  Duration of need: 99 months.   order for DME   No No   Sig: Equipment being ordered: Nebulizer   pantoprazole (PROTONIX) 20 MG EC tablet 6/6/2018 at AM  No Yes   Sig: Take 1 tablet (20 mg) by mouth every morning (before breakfast)   senna-docusate (SENOKOT-S;PERICOLACE) 8.6-50 MG per tablet Past Week at Unknown time  Yes Yes   Sig: Take 1-2 tablets by mouth 2 times daily as needed for constipation   simethicone (MYLICON) 80 MG chewable tablet Past Month at Unknown time  Yes Yes   Sig: Take 80 mg by mouth every 6 hours as needed for flatulence or cramping   tamsulosin (FLOMAX) 0.4 MG capsule 6/6/2018 at Unknown time  No Yes   Sig: Take 2 capsules (0.8 mg) by mouth daily   thiamine (VITAMIN B-1) 100 MG tablet 6/6/2018 at Unknown time  No Yes   Sig: Take 1 tablet (100 mg) by mouth daily   warfarin (COUMADIN) 1 MG tablet 6/6/2018 at 0700  No Yes   Sig: Take 3 tablets (3 mg) by mouth daily Or as directed after INR dosing changes      Facility-Administered Medications: None     Allergies   Allergies   Allergen Reactions     Seasonal Allergies        Social History   I have reviewed this patient's social history and updated it with pertinent information if needed. Sohan Rendon  reports that he has quit smoking. He has quit using smokeless tobacco. He reports that he does not drink alcohol or use illicit drugs.    Family History   I have reviewed this patient's family history and updated it with pertinent information if needed.   Family History   Problem Relation Age of Onset     Family History Negative No family hx of      Glaucoma No family hx of      Macular  Degeneration No family hx of      CANCER No family hx of      no skin cancer       Review of Systems   Limited given mental status    Physical Exam   Temp: 97.8  F (36.6  C) Temp src: Axillary BP: 127/87   Heart Rate: 65 Resp: 14 SpO2: 100 % O2 Device: Nasal cannula Oxygen Delivery: 4 LPM  Vital Signs with Ranges  Temp:  [97.5  F (36.4  C)-98  F (36.7  C)] 97.8  F (36.6  C)  Heart Rate:  [60-79] 65  Resp:  [10-16] 14  BP: ()/(72-91) 127/87  SpO2:  [94 %-100 %] 100 %  195 lbs 8.77 oz    GEN: obtunded does not open eyes to voice withdrawing to pain  HEENT: no icterus  CV: LVAD hum laying flat in no distress JVP flat   CHEST: CTAB  ABD: soft, NT/ND, NABS  : no flank/suprapubic tenderness  NEURO: obtunded following some commands L sided weakness aphasic    Echo 5/10/18  Left ventricle poorly visualized but with contrast the apical segments are seen and there is akineses to hypokinesis without obvious thrombus.Right ventricle with ICD lead and appears dilated with systolic dysfunction.      Data    Data reviewed today:  I  Recent Labs  Lab 06/06/18 2116 06/06/18 2115 06/06/18 2114 06/04/18 06/02/18  1214   WBC  --  8.4  --   --  6.4   HGB  --  8.5*  --   --  8.2*   MCV  --  95  --   --  92   PLT  --  219  --   --  237   INR  --  1.94* 2.0* 1.8*  --    NA  --  136  --   --  140   POTASSIUM  --  4.0  --   --  4.1   CHLORIDE  --  106  --   --  112*   CO2  --  21  --   --  20   BUN  --  26  --   --  25   CR  --  2.14*  --   --  2.08*   ANIONGAP  --  8  --   --  8   JOSÉ  --  7.9*  --   --  8.1*   GLC  --  153*  --   --  219*   ALBUMIN  --  3.2*  --   --  3.0*   PROTTOTAL  --  7.5  --   --  7.0   BILITOTAL  --  0.7  --   --  0.6   ALKPHOS  --  117  --   --  102   ALT  --  19  --   --  20   AST  --  50*  --   --  64*   TROPI  --  <0.015  --   --   --    TROPONIN 0.01  --   --   --   --        Recent Results (from the past 24 hour(s))   CT Head w/o Contrast    Narrative    CT HEAD W/O CONTRAST 6/6/2018 9:40  PM    History: acute onset of altered mental status;     Comparison: CT on 5/31/2018.    Technique: Using multidetector thin collimation helical acquisition  technique, axial, coronal and sagittal CT images from the skull base  to the vertex were obtained without intravenous contrast.     Findings: No significant change in extensive encephalomalacia  involving the right frontal, parietal, temporal lobes, left occipital  lobe and posterior aspect of the right cerebellar hemisphere. No  intracranial hemorrhage, mass effect, or midline shift. No  hydrocephalus. Patchy periventricular white matter hypodensities.  Diffuse cerebral volume loss more than expected for the age. The gray  to white matter differentiation of the cerebral hemispheres is  preserved. The basal cisterns are patent.    The visualized paranasal sinuses are clear. The mastoid air cells are  clear.       Impression    Impression:  1. No acute intracranial pathology.  2. Unchanged extensive encephalomalacia in the right middle cerebral  artery territory, left occipital lobe, and right cerebellar lobe.  3. Unchanged small vessel ischemic disease and diffuse cerebral and  cerebellar volume loss more than expected for age.    I have personally reviewed the examination and initial interpretation  and I agree with the findings.    BABS OCAMPO MD   XR Chest Port 1 View    Narrative    Exam: XR CHEST PORT 1 VW, 6/6/2018 10:13 PM    Indication: altered mental status;     Comparison: Radiograph on 5/10/2018    Findings:   Single portable semiupright view of chest.  Left chest wall AICD and transvenous catheter in place. Partial  visualization of second-generation LVAD.  Postoperative changes of cardiac surgery including nondisplaced median  sternotomy wires.    Stable enlarged cardiac silhouette. Low lung volumes. Left pleural  effusion with associated atelectasis/consolidation. Bibasilar  atelectasis, left more than right. No pneumothorax. Visualized  upper  abdomen is unremarkable. No acute osseous abnormality.      Impression    Impression:   1. Minimal bibasilar and retrocardiac opacities, left greater than  right. Atelectasis and/or consolidation.  2. Postoperative changes of cardiac surgery with enlarged cardiac  silhouette, unchanged.    I have personally reviewed the examination and initial interpretation  and I agree with the findings.    ISRAEL PEOPLES MD   CT Head Neck Angio w/o & w Contrast    Narrative    CTA ANGIOGRAM HEAD NECK 6/6/2018 10:56 PM    Head CT without contrast  CT angiogram of the neck   CT angiogram of the base of the brain with contrast  Reconstruction by the Radiologist on the 3D workstation    History:  acute CVA symptoms;     Comparison: Head CT 6/6/2018, 5/31/2018. Head and neck CTA 4/18/2016.    Technique:  HEAD and NECK CTA: During rapid bolus intravenous injection of  nonionic contrast material, axial images were obtained using thin  collimation multidetector helical technique from the base of the skull  through the [place] of [place]. This CT angiogram data was reconstructed  at thin intervals with mild overlap. Images were sent to the [employer]  workstation, and 3D reconstructions were obtained. The axial source  images, multiplanar reformations, 3D reconstructions in both maximum  intensity projection display and volume rendered models were reviewed,  with reconstructions performed by the technologist and the  radiologist.    Contrast: isovue 370    Findings:    Head and neck CTA:   Occlusion of the petrous and cavernous segments of the left internal  carotid artery with reconstitution of the supraclinoid internal  carotid artery via a patent anterior communicating artery and  leptomeningeal collaterals. Diminished contrast filling of the  cervical segment of the left internal carotid artery due to downstream  occlusion.     Stable moderate focal narrowing of the cavernous segment of the right  internal carotid artery.     New  moderate focal stenosis of the distal right M1 segment and  proximal right anterior temporal artery.      Increased high-grade short segment stenosis of the proximal right  vertebral artery V1 segment. Dominant left vertebral artery is widely  patent. Posterior communicating arteries are not present. Patent  hypoplastic left anterior cerebral artery. The anterior communicating  artery is patent.    Chronic right middle cerebral artery, right PICA and left PCA  territory infarcts are better demonstrated on head CT performed  earlier exam today. Partially visualized left subclavian and  portacatheter. Sternotomy wires. Multilevel cervical spondylosis. 1.7  cm right thyroid lobe nodule, not significantly changed since  4/18/2016.      Impression    Impression:    1. Since 4/18/2016, new left internal carotid artery petrous/cavernous  segment occlusion with reconstitution at the level of the supraclinoid  segment via a patent anterior communicating artery and leptomeningeal  collaterals.  2. Increased high-grade short segment stenosis of the proximal right  vertebral artery V1 segment.  3. Increased moderate short segment stenoses of the right distal M1  and proximal anterior temporal artery branch.  4. Stable moderate stenosis of the cavernous segment of the right  internal carotid artery.    I have personally reviewed the examination and initial interpretation  and I agree with the findings.    BE JIMÉNEZ MD     Attending Attestation:  Patient seen and examined by me with the team. I have performed all pertinent elements of the physical examination and reviewed the note above. I have reviewed pertinent laboratory, echocardiographic, imaging, and cardiac catheterization results. I agree with the plan of care as described in this note.    Patient well known to the entire LVAD team and program. Case discussed with the , Dr. Lemons. There is extensive documentation in the chart that the patient is  not a candidate for pump exchange at this moment. There is no need to monitor LDH levels. There are no significant LVAD alarms. Management per neuro-critical care team and protocol for acute stroke.    Jonathan Chavez MD, PhD

## 2018-06-07 NOTE — PLAN OF CARE
Problem: Patient Care Overview  Goal: Plan of Care/Patient Progress Review  PT / 6B - PT orders received and acknowledged for PT evaluation following stroke. Pt with increased medical needs with transfer to .  PT evaluation will rescheduled.

## 2018-06-07 NOTE — PLAN OF CARE
Problem: Patient Care Overview  Goal: Plan of Care/Patient Progress Review  Outcome: No Change  Patient arrived to unit at 0930. TF placed this afternoon with IR. CT scan of head to evaluate for potential CVA. Neuro checks down to Q4H. LVAD Flows do not usually read. When numbers come through, Flows around 4.0. RPM 8800, PIs 5s, [place] 3s. Neurologically, PERRLA 2mm, does not follow commands, will move right upper extremity spontaneously. Does not move other extremities. Afebrile. Floor orders.

## 2018-06-07 NOTE — PLAN OF CARE
Problem: Patient Care Overview  Goal: Plan of Care/Patient Progress Review  Discharge Planner SLP   Patient plan for discharge: not discussed  Current status: Completed a very abbreviated swallow eval per MD orders. Pt presents with severe oral pharygneal dysphagia- int the setting of AMS, agitation, inability to follow commands and was not able to fully pariticipate in full swallow eval 2/2 confusion. Results of attempts at swallow eval today are as follows: Attempted 1/2 teaspoon amts of thin water with pt's daugther assisting ( holding pt's hands as he was agitated and pulling at tubes, tape etc) and also  present. Pt is not following any commands to swallow-- with a very small amount of water that he took in ( likely less than 1/4 teaspoon amt)-- pt holding and then trying to spit out but also coughing- question of it he even swallowed any of it or if the coughing was more his attempt to refuse the po trial-- difficult to fully determine. No further po trials attempted 2/2 pt agitation and non- cooperation as well as overall confusioin- not following any commands- did not want to put pt at further risk of aspiration. REcommending pt continue strict NPO with consideration of temporary alternative means to meet nutritional and hydration needs. SLP will f/u daily for po readiness.   Barriers to return to prior living situation: dysphagia- NPO, decondition/weakness, cognition- confusion AMS  Recommendations for discharge: TCU  Rationale for recommendations: anticipate sptx needs for dysphagia and likely communication/ cognition       Entered by: Rachel Dill 06/07/2018 11:14 AM

## 2018-06-07 NOTE — PROGRESS NOTES
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 LKW 2010 when sitting with family; he called out and speech was unsensical. He then had reduced speech output, illogical speech and did not appear to understand them. At baseline he is fluent without language difficulties, with left hemiplegia, and RLE weakness due to hip fracture. He has not had any falls, no recent illnesses.    Clinical Swallow Evaluation   Oral Musculature unable to assess due to poor participation/comprehension   Structural Abnormalities none present   Dentition present and adequate   Oral Musculature Comments Pt not following any commands- agitated and pulling at tubes etc- daughter present to help and also interpretere- but not able to fully assess oralmotor function as pt not following commands   Additional evaluation(s) completed today No   Swallow Eval   Feeding Assistance dependent   Clinical Swallow Eval: Thin Liquid Texture Trial   Mode of Presentation, Thin Liquids spoon;fed by clinician   Volume of Liquid or Food Presented 1/2 teaspoon amt of thn water- attempted 2x   Oral Phase of Swallow other (see comments)  (holding and spitting out)   Pharyngeal Phase of Swallow coughing/choking   Diagnostic Statement Attempted 1/2 teaspoon amts of thin water with pt's daugther assisting ( holding pt's hands as he was agitated and pulling at tubes, tape  etc) and also  present. Pt is not following any commands to swallow-- with a very small amount of water that he took in ( likely less than 1/4 teaspoon amt)-- pt holding and then trying to spit out but also coughing- question of it he even swallowed any of it or if the coughing was more his attempt to refuse the po trial-- difficult to fully determine. No further po trials attempted 2/2 pt agitation and non- cooperation as well as overall confusioin- not following any commands- did not want to put pt at further risk of aspiration   VFSS Evaluation   VFSS Additional Documentation No   FEES Evaluation   Additional Documentation No   Swallow Compensations   Results Suspect aspiration   Esophageal Phase of Swallow   Patient reports or presents with symptoms of esophageal dysphagia No   General Therapy Interventions   Planned Therapy Interventions Dysphagia Treatment   Swallow Eval: Clinical Impressions   Skilled Criteria for Therapy Intervention Skilled criteria met.  Treatment indicated.   Functional Assessment Scale (FAS) 1   Dysphagia Outcome Severity Scale (KYRA) Level 1 - KYRA   Treatment Diagnosis Severe oral pharygneal dysphagia   Diet texture recommendations NPO   Demonstrates Need for Referral to Another Service dietitian;occupational therapy;physical therapy   Therapy Frequency daily   Predicted Duration of Therapy Intervention (days/wks) 2 weeks   Anticipated Discharge Disposition inpatient rehabilitation facility   Risks and Benefits of Treatment have been explained. Yes   Patient, family and/or staff in agreement with Plan of Care Yes   Clinical Impression Comments Completed a very abbreviated swallow eval per MD orders. Pt presents with severe oral pharygneal dysphagia- int the setting of AMS, agitation, inability to follow commands and was not able to fully pariticipate in full swallow eval 2/2 confusion. Results of attempts at swallow eval today are as follows: Attempted 1/2 teaspoon amts of  thin water with pt's daugther assisting ( holding pt's hands as he was agitated and pulling at tubes, tape etc) and also  present. Pt is not following any commands to swallow-- with a very small amount of water that he took in ( likely less than 1/4 teaspoon amt)-- pt holding and then trying to spit out but also coughing- question of it he even swallowed any of it or if the coughing was more his attempt to refuse the po trial-- difficult to fully determine. No further po trials attempted 2/2 pt agitation and non- cooperation as well as overall confusioin- not following any commands- did not want to put pt at further risk of aspiration. REcommending pt continue strict NPO with consideration of temporary alternative means to meet nutritional and hydration needs. SLP will f/u daily for po readiness.    Total Evaluation Time   Total Evaluation Time (Minutes) 10

## 2018-06-07 NOTE — PHARMACY-ADMISSION MEDICATION HISTORY
Admission medication history interview status for the 6/6/2018 admission is complete. See Epic admission navigator for allergy information, pharmacy, prior to admission medications and immunization status.     Medication history interview sources:  Patient's Daughter    Changes made to PTA medication list (reason)  Added: none  Deleted:     Duonebs     Ketotifen    Oxycodoe  Changed:     Senna-docusate: Take 2 tablets by mouth twice daily as needed for constipation --> Take 1-2 tables by mouth twice daily as needed    Simethicone: Take 1 tablet by mouth four times daily --> Take 1 tablet by mouth four times daily as needed     Additional medication history information (including reliability of information, actions taken by pharmacist): The daughter takes care of the patient's medications and is knowledge about her father's needs    The patient's daughter states that the patient has no more oxycodone at home and the patient is not using it     The patient also did not need the Duonebs or the Ketotifen drops any more.     Per the daughter, the patient's last dose of warfarin was 6/6/18 around 0700. The daughter endorses and INR goal of 2-3 but is unsure.       Prior to Admission medications    Medication Sig Last Dose Taking? Auth Provider   acetaminophen (TYLENOL) 325 MG tablet Take 2 tablets (650 mg) by mouth every 6 hours as needed for mild pain 6/6/2018 at 1300 Yes    aspirin 81 MG tablet Take 1 tablet (81 mg) by mouth daily 6/6/2018 at Unknown time Yes Volodymyr Champion MD   bisacodyl (DULCOLAX) 10 MG Suppository Place 1 suppository (10 mg) rectally daily as needed for constipation Past Week at Unknown time Yes Gabe Peters MD   finasteride (PROSCAR) 5 MG tablet Take 1 tablet (5 mg) by mouth daily 6/6/2018 at AM Yes Lenore Gardner PA   furosemide (LASIX) 20 MG tablet Take 1 tablet (20 mg) by mouth as needed 6/6/2018 at Unknown time Yes Vicky Ziegler, NP   levETIRAcetam (KEPPRA) 100 MG/ML  solution Take 7.5 mLs (750 mg) by mouth every 12 hours 6/6/2018 at AM Yes Volodymyr Chapmion MD   loratadine (CLARITIN) 10 MG tablet TAKE 1 TABLET (10 MG) BY MOUTH DAILY 6/6/2018 at Unknown time Yes Thomas Dill MD   losartan (COZAAR) 25 MG tablet Take 1 tablet (25 mg) by mouth daily 6/6/2018 at Unknown time Yes Zuleika Patterson APRN CNP   Metoprolol Succinate (TOPROL XL PO) Take 50 mg by mouth daily  6/6/2018 at AM Yes Unknown, Entered By History   nitroGLYcerin (NITROSTAT) 0.4 MG sublingual tablet Place 1 tablet (0.4 mg) under the tongue every 5 minutes as needed for chest pain  Yes Raghu Almodovar MD   pantoprazole (PROTONIX) 20 MG EC tablet Take 1 tablet (20 mg) by mouth every morning (before breakfast) 6/6/2018 at AM Yes Raghu Almodovar MD   senna-docusate (SENOKOT-S;PERICOLACE) 8.6-50 MG per tablet Take 1-2 tablets by mouth 2 times daily as needed for constipation Past Week at Unknown time Yes Unknown, Entered By History   simethicone (MYLICON) 80 MG chewable tablet Take 80 mg by mouth every 6 hours as needed for flatulence or cramping Past Month at Unknown time Yes Unknown, Entered By History   tamsulosin (FLOMAX) 0.4 MG capsule Take 2 capsules (0.8 mg) by mouth daily 6/6/2018 at Unknown time Yes Raghu Almodovar MD   thiamine (VITAMIN B-1) 100 MG tablet Take 1 tablet (100 mg) by mouth daily 6/6/2018 at Unknown time Yes Raghu Almodovar MD   warfarin (COUMADIN) 1 MG tablet Take 3 tablets (3 mg) by mouth daily Or as directed after INR dosing changes 6/6/2018 at 0700 Yes Raghu Almodovar MD   ALEK CONTOUR test strip USE TO TEST BLOOD SUGAR 2 TIMES DAILY OR AS DIRECTED.   Thomas Dill MD   blood glucose monitoring (ALEK MICROLET) lancets USE TO TEST BLOOD SUGAR 2 TIMES DAILY OR AS DIRECTED   Thomas Dill MD   blood glucose monitoring (NO BRAND SPECIFIED) lancets Use to test blood sugars 2 times daily or as directed.   Thomas Dill  MD Tera   Melatonin 10 MG TABS tablet Take 1 tablet (10 mg) by mouth At Bedtime 6/5/2018 at PM  Raghu Almodovar MD   nystatin-triamcinolone (MYCOLOG II) cream Apply 1 g topically 2 times daily for 10 days 6/5/2018  Yancy Roger MD   order for DME Equipment being ordered: Nebulizer   Zuleika Patterson APRN CNP   order for DME Bilateral heel protective cushioning boots.  Dx: previous stroke with left hemiplegia.  Duration of need: 99 months.   Thomas Dill MD   order for DME Equipment being ordered: Cushioned heel boots.   Thomas Dill MD   order for DME Equipment being ordered: Wheelchair, manual, with elevated leg rests and tilt in space back.  Please fax to [facility]; I called them 11/26/16 and they requested the new order.  Face to face is in my 10/26/16 note.   Thomas Dill MD   order for DME Equipment being ordered: Wheelchair, manual.   Thomas Dill MD   order for DME Equipment being ordered: Hospital Bed, fully electric.   Thomas Dill MD   order for DME Equipment being ordered: Reclining manual w/c with bilateral elevating leg rests.   Thomas Dill MD   order for DME Equipment being ordered: Bilateral leg supports for manual wheelchair.   Thomas Dill MD   ORDER FOR DME Equipment being ordered: Lift chair.    Please see indications and face-to-face encounter details in 2/3/15 Office Visit note.   Thomas Dill MD         Medication history completed by: Kellen Donald, PharmD Student

## 2018-06-07 NOTE — PROGRESS NOTES
"CLINICAL NUTRITION SERVICES - ASSESSMENT NOTE     Nutrition Prescription    RECOMMENDATIONS FOR MDs/PROVIDERS TO ORDER:  Fluids per MD    Malnutrition Status:    Unable to assess    Recommendations already ordered by Registered Dietitian (RD):  1. Once new TF is placed and confirmed by XR, begin TF with TwoCal @ 10 ml/hr and adv per providers request, ( ONLY advance if K+/mg++ WNL and Phos >1.9) to goal @ 50 ml/hr.       - TwoCal HN @ 50 mL/hr (1200 mL/day) to provide 2400 kcals (27 kcal/kg/day), 101 g PRO (1.1 g/kg/day), 840 mL H2O, 263 g CHO and 6 g Fiber daily.  + 3 packets ProSource: 2520 kcals (28), 134 g pro (2)      2. Monitor K+/Mg++/Phos daily with TF start and advancement to goal infusion to evaluate for refeeding risk, replace per protocol       3. Order free water flushes 30 ml every 4 hours for tube patency.      4. Recommend Certavite (15 ml/day via FT) to ensure adequate micronutrient needs being met.     Future/Additional Recommendations:  - Educate patient on 2 G Na diet as able/appropriate  - Monitor tolerance to TF and lytes  - Obtain metabolic cart as appropriate     REASON FOR ASSESSMENT  Sohan Rendon is a/an 68 year old male assessed by the dietitian for Provider Order - Nutrition Education - 2G Na diet and Provider Order - Registered Dietitian to Assess and Order TF per Medical Nutrition Therapy Protocol    NUTRITION HISTORY  Per SLP: Pt presents with severe oral pharygneal dysphagia- int the setting of AMS, agitation, inability to follow commands and was not able to fully pariticipate in full swallow eval 2/2 confusion.  Unable to obtain diet hx, pt asleep and not appropriate/poor historian  TF placement consult released to radiology d/t Heartmate. RN tried placing NG and was unsuccessful, IR to place.     CURRENT NUTRITION ORDERS  Diet: NPO  Intake/Tolerance: Unable to assess    LABS  Labs reviewed    MEDICATIONS  Medications reviewed    ANTHROPOMETRICS  Height: 182.9 cm (6' 0\")  Most Recent " Weight: 88.7 kg (195 lb 8.8 oz)    IBW: 80.9 kg  BMI: Overweight BMI 25-29.9  Weight History:   Wt Readings from Last 10 Encounters:   06/07/18 88.7 kg (195 lb 8.8 oz)   05/31/18 89.3 kg (196 lb 13.9 oz)   05/16/18 86.2 kg (190 lb)   05/10/18 89.2 kg (196 lb 10.4 oz)   05/07/18 83.5 kg (184 lb)   05/03/18 86.8 kg (191 lb 5.8 oz)   04/17/18 94.3 kg (208 lb)   04/11/18 81.6 kg (180 lb)   03/18/18 82.6 kg (182 lb 1.6 oz)   11/26/17 84.2 kg (185 lb 10 oz)     Dosing Weight: 89 kg (actual wt upon admit 6/7)    ASSESSED NUTRITION NEEDS  Estimated Energy Needs: 4745-7125 kcals/day (25 - 30 kcals/kg)  Justification: Maintenance  Estimated Protein Needs: 107-134 grams protein/day (1.2 - 1.5 grams of pro/kg)  Justification: Maintenance  Estimated Fluid Needs: 1 mL/kcal/day  Justification: Maintenance or per MD    PHYSICAL FINDINGS  See malnutrition section below.    MALNUTRITION  % Intake: Unable to assess  % Weight Loss: None noted  Subcutaneous Fat Loss: Unable to assess  Muscle Loss: Unable to assess  Fluid Accumulation/Edema: Does not meet criteria  Malnutrition Diagnosis: Unable to determine due to pt asleep/ not appropriate at this time    NUTRITION DIAGNOSIS  Inadequate oral intake related to dysphagia as evidenced by need for enteral nutrition to provide 100% of estimated need at this time.      INTERVENTIONS  Implementation  Nutrition Education: Not appropriate at this time due to patient condition   Enteral Nutrition - Initiate     Goals  Total avg nutritional intake to meet a minimum of 25 kcal/kg and 1.2 g PRO/kg daily (per dosing wt 89 kg).     Monitoring/Evaluation  Progress toward goals will be monitored and evaluated per protocol.      Yudy Mai, RD, MS, LD  6B- Pager: [phone]

## 2018-06-07 NOTE — PROGRESS NOTES
Transfer  Transferred to:7e  Via:bed  Reason for transfer:Pt not appropriate for 6B due to new stroke  Family: Aware of transfer  Belongings: Packed and sent with pt  Chart: Delivered with pt to next unit  Medications: Meds sent to new unit with pt  Report given to: say Packer RN

## 2018-06-07 NOTE — ED TRIAGE NOTES
Pt. BIBA from home for sudden onset of AMS. Family reports that patient was normal and then all of a sudden he would not open eyes or talk to family. EMS reports AVSS in route, . Upon arrival, pt. squeezing eyes shut and moving R arm, HX of stroke deficits on L arm. AVSS on RA. Family @ bedside.

## 2018-06-07 NOTE — PLAN OF CARE
Problem: Patient Care Overview  Goal: Plan of Care/Patient Progress Review  OT 6B: Cancel - per discussion with RN, pt not appropriate for OT evaluation. Will reschedule per POC.

## 2018-06-07 NOTE — PLAN OF CARE
Admission          6/6/2018  9:04 PM  -----------------------------------------------------------  Reason for admission: AMS, frequent neuro checks  Primary team notified of pt arrival.  Admitted from: ED, Report received by Aliza ESCOBAR   Via: stretcher  Accompanied by: family, Syrian Speaking  Belongings: Placed in closet; valuables sent home with family, Family OK'd RN's to cut shirt off to place hospital gown, LVAD equipment remains at bedside.  Admission Profile: In progress.  Teaching: orientation to unit and call light- call light within reach, call don't fall, use of console, meal times, when to call for the RN, and enforced importance of safety   Access: PIV  Telemetry:Placed on pt  Ht./Wt.: complete  Double RN Skin assessment: Lidia ARIAS RN & Anita PEREZ RN    Temp:  [97.5  F (36.4  C)] 97.5  F (36.4  C)  Heart Rate:  [64-79] 65  Resp:  [10-16] 11  BP: ()/(72-91) 105/74  SpO2:  [94 %-100 %] 100 %

## 2018-06-07 NOTE — PHARMACY-ANTICOAGULATION SERVICE
Clinical Pharmacy - Warfarin Dosing Consult     Pharmacy has been consulted to manage this patient s warfarin therapy. The consult received had a goal of 2-3 (also endorsed by the daughter), however previous warfarin clinic notes have a goal INR range of 1.8-2.5. We will clarify for proper management of the patient's warfarin.        INR   Date Value Ref Range Status   06/06/2018 1.94 (H) 0.86 - 1.14 Final     INR Point of Care   Date Value Ref Range Status   06/06/2018 2.0 (H) 0.86 - 1.14 Final     Chromogenic Factor 10   Date Value Ref Range Status   08/10/2014 24 (L) 70 - 130 % Final     Comment:     Therapeutic Range:  A Chromogenic Factor 10 level of approximately 20-40%   inversely correlates with an INR of 2-3 for patients receiving Warfarin.   Chromogenic Factor 10 levels below 20% indicate an INR greater than 3 and   levels above 40% indicate an INR less than 2.       Factor 2 Assay   Date Value Ref Range Status   07/21/2014 25 (L) 60 - 140 % Final     Comment:     Analyte Specific Reagents (ASRs) are used in many laboratory tests necessary   for   standard medical care and generally do not require FDA approval.  This test   was   developed and its performance characteristics determined by [facility].  It has not been cleared or approved by   the [place] Food and Drug Administration.         The patient is currently NPO. We will assess appropriateness for warfarin therapy later in the day pending SLP and modify plans according to the care team.    Please contact pharmacy as soon as possible if the warfarin needs to be held for a procedure or if the warfarin goals change.

## 2018-06-07 NOTE — CONSULTS
[facility], [place]      Neurology Stroke Consult    Patient Name: Sohan Rendon  : 1950 MRN#: [id number]    STROKE DATA    Stroke Code:  Time called:  2018  Time patient seen:  2018  Onset of symptoms:  2018  Last known normal (pt's baseline):  2018  Head CT read by Dr Cruz at:  2018       TPA treatment:  Not given due to current / recent anticoagulant use with INR > 1.7, head trauma / stroke within past 3 months.     National Institutes of Health Stroke Scale (at presentation)  NIHSS done at:  time patient seen      Score    Level of consciousness:  (0)   Alert, keenly responsive     LOC questions:  (2)   Answers neither question correctly    LOC commands:  (2)   Performs neither task correctly    Best gaze:  (0)   Normal    Visual:  (0)   No visual loss    Facial palsy:  (0)   Normal symmetrical movements    Motor arm (left):  (4)   No movement    Motor arm (right):  (0)   No drift    Motor leg (left):  (4)   No movement    Motor leg (right):  (0)   No drift    Limb ataxia:  (0)   Absent    Sensory:  (0)   Normal- no sensory loss    Best language:  (3)   Mute- global aphasia    Dysarthria:  (0)   Normal    Extinction and inattention:  (0)   No abnormality        NIHSS Total Score:  15        Dysphagia Screen  Time of screening: Not screened - too aphasiac to follow commands  Screening results: Failed screening - strict NPO pending SLP evaluation     ASSESSMENT & RECOMMENDATIONS     The patient was seen for acute onset global aphasia/encephalopathy. LKW , baseline prior to this was fully fluent. Initial head CT without evidence of bleed, but clear demonstration of large R MCA encephalomalacia. INR 2.0, glucose 153, VSS. On exam, the patient appeared encephalopathic, resisting the exam purposefully with the right arm, squeezing eyes tight during most of the exam. He was able to undergo head CT but was too restless  for CTA. Upon return to the ED, NIHSS confirmed as above. He received Ativan 1 mg x 2 for restlessness in effort to obtain CTA to rule out thrombus. He would not be a candidate for tPA due to INR use and recent infarct in May 2018, but he would be a thrombectomy candidate. In order to obtain CTA more rapidly, recommended further sedation - ED elected to use haldol, with success. HCTA revealed an occluded petrous L ICA. He was admitted for stroke work up and evaluation of his LVAD. Infectious work up in the ED was negative. After the patient was sedated, he was examined serially and there were no acute changes. His case was discussed with endovascular and there will be no acute intervention. However we need to rule out any LVAD thrombus.     Upon further chart review, it appears that he has a unclear history of seizures. Heme he has reportedly had at least 1 event in 2015 where he had left upper extremity shaking with altered consciousness. He has had EEG that did not reveal anything epileptic, but he is on Keppra. Seizure is any etiology for today's event is lower on my differential, especially in light of a vascular lesion in the patient's dominant hemisphere.    # Global aphasia  # L petrous ICA occlusion, unclear chronicity, not seen on CTA 2014  - Admit to Neurology  - Discuss with endovascular   - Neurochecks Q 4 hr  - Permissive HTN; labetalol PRN for MAPs >80  - Euthermia, Euglycemia  - Daily aspirin for secondary stroke prevention  - Statin  - TTE with Bubble Study  - Telemetry, EKG  - Bedside Glucose Monitoring  - A1c, Lipid Panel, Troponin x 3  - PT/OT/SLP  - PM&R  - Stroke Education  - Depression Screen  - Apnea Screen     # LVAD, INR 2.0  # CHF   - Lasix PRN held   - Pharmacy consult    # H/o seizures  - Keppra 750 mg BID  - Check Keppra level    # HTN  - hold home metoprolol, losartan     # BPH  - Tamsulosin, finasteride     Other home meds:  - thiamine     NPO  IV  cc/hr  Prophylaxis          For  VTE Prevention:  - warfarin   - ASA 81 mg  - Senna  Code: Full  Dispo: 1-2 days to group home      [facility]  Sohan Rendon is a 68 year old male with history of R MCA stroke in 2013 with 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 LKW 2010 when sitting with family; he called out and speech was unsensical. He then had reduced speech output, illogical speech and did not appear to understand them. At baseline he is fluent without language difficulties, with left hemiplegia, and RLE weakness due to hip fracture. He has not had any falls, no recent illnesses.     Pertinent Past Medical/Surgical History  Past Medical History:   Diagnosis Date     Acute right MCA stroke (H) 6/2013    With L sided hemiparesis      Anemia of chronic disease     Baseline Hb 8-9     BPH (benign prostatic hyperplasia)      CHF (congestive heart failure), NYHA class IV (H) 10/9/2012    s/p HeartMate II.  Was on milrinone and dobutamine prior to LVAD      CKD (chronic kidney disease)     Baseline Cr=     Clostridium difficile colitis 12/2012      Dysphagia     PEG tube placed in 2012.  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  Normal carotid duplex 3/2014.       Esophagitis 12/2012    EGD with esophagitis and multiple douenal ulcers     Fracture of femoral neck, right, closed (H) 2/3/2015    Presumed pathologic as patient is non-weight-bearing and suffered no trauma.  Family declined operative repair during hospital stay 1/23 - 1/30/15.     Gastric and duodenal angiodysplasia with hemorrhage 6/18/2015     GERD (gastroesophageal reflux disease)      Gout      Hematuria      HTN (hypertension)     LDL=59 (3/29/2014)     Hyperlipaemia      Ischemic cardiomyopathy      Mitral regurgitation     s/p MVR with Carbomedics ring      Myocardial infarction  1998    In [facility] without intervention      Nonsenile cataract     BE     PFO (patent foramen ovale)     s/p closure (10/9/2012)     TB lung, latent     s/p 9 months INH in 2012        Past Surgical History:   Procedure Laterality Date     C CABG, VEIN, FIVE  2001     CATARACT IOL, RT/LT Right 11/19/2015     ENDOSCOPIC RETROGRADE CHOLANGIOPANCREATOGRAM N/A 5/9/2017    Procedure: COMBINED ENDOSCOPIC RETROGRADE CHOLANGIOPANCREATOGRAPHY, PLACE TUBE/STENT;  Endoscopic Retrograde Cholangiopancreatogram, Stent (Zimmon Biliary 7fr 12cm) Placement;  Surgeon: Cristo Grove MD;  Location: [facility] OR     ESOPHAGOSCOPY, GASTROSCOPY, DUODENOSCOPY (EGD), COMBINED N/A 6/10/2015    Procedure: COMBINED ESOPHAGOSCOPY, GASTROSCOPY, DUODENOSCOPY (EGD);  Surgeon: Edu Jenkins MD;  Location: [facility] GI     ESOPHAGOSCOPY, GASTROSCOPY, DUODENOSCOPY (EGD), COMBINED N/A 6/15/2015    Procedure: COMBINED ESOPHAGOSCOPY, GASTROSCOPY, DUODENOSCOPY (EGD);  Surgeon: Yury Bonner MD;  Location: [facility] OR     H INSERT ICD  2/10/2011    And pacemaker.  Not BiV     INSERT VENTRICULAR ASSIST DEVICE LEFT (HEARTMATE II)  10/9/2012     IR GASTRO JEJUNOSTOMY TUBE PLACEMENT       PHACOEMULSIFICATION CLEAR CORNEA WITH STANDARD INTRAOCULAR LENS IMPLANT Right 11/19/2015    Procedure: PHACOEMULSIFICATION CLEAR CORNEA WITH STANDARD INTRAOCULAR LENS IMPLANT;  Surgeon: Violeta Cosme MD;  Location: [facility] OR     REPAIR PATENT FORAMEN OVALE  10/9/2012     REPAIR VALVE MITRAL  2/9/2012    28 mm Carbomedics 2/3 ring        Medications: I have reviewed this patient's current medications.    Allergies: All allergies reviewed and addressed.    Family History: No FH stroke.    Social History: Lives at home with children, former smoker.    Tobacco use: Former smoker    ROS:  Review of systems not obtained due to patient factors - confusion, lack of cooperation, language barrier, mental status and sedation    PHYSICAL EXAMINATION  Vital Signs:  B/P: 107/85,  T: 97.5,   "P: Data Unavailable,  R: 16    General:  Lying in bed, squeezing eyes closed, moving the right side spontaneously  HEENT:  normocephalic/atraumatic, Mild epistaxis  Cardio:  RRR  Pulmonary:  no respiratory distress  Abdomen:  soft  Extremities:  no edema  Skin:  warm/dry     Neurologic  Mental Status:  Lying in bed with his eyes closed, resisted knee exam, very little speech output, only intermittently saying \"get off me\", not able to say his name or follow commands.  Cranial Nerves:  PERRL, EOMI with normal smooth pursuit, facial movements symmetric, no dysarthria, tongue protrusion midline  Motor:  no abnormal movements, increaed tone in LUE, LUE/LLE plegic but withdraw minimally to pain. RLE 2/5, RUE 5/5. No abnormal movements.  Reflexes:  2+ and symmetric throughout, no clonus, left toe up  Sensory:  withdraws in all four  Coordination:  cannot assess due to mental status  Station/Gait:  unable to test    Labs  Labs and Imaging reviewed and used in developing the plan; pertinent results included.     Lab Results   Component Value Date     (H) 06/06/2018       The patient was discussed with the Fellow, Dr. Garcia.  The staff this evening is Dr. Garcia..    Jayna Cruz DO     Patient is seen and examined 6/7 with Dr. Blackmon  The patient can say few words today but stille ncephalopathic  Ct head shows new LT MCA distribution infarction  Will transfer to the floor  Cardiology consult done, Appreciate Recs      Sherief Boss  Neurocritical care Fellow  Pager [phone]    "

## 2018-06-07 NOTE — PROGRESS NOTES
Almaz called the on-call pager saying that Sohan was unable to speak for the past 10 minutes. I advised her to call 911. She said she had already called 911 and was letting the LVAD people know. I notified the  ED that Sohan was coming in by ambulance. The ED RN said she would call with questions about the LVAD. I sent Dr. Lemons a text stating that Sohan was being brought to the ED.

## 2018-06-07 NOTE — PHARMACY
Pharmacy Stroke Code Response  Pharmacist responded as part of the Stroke Code Team activation to [facility] area emergency department.  The Stroke Team determined that the patient was not a candidate for IV alteplase therapy and the pharmacy team was dismissed at 21:30.     Ronda Aguirre, PharmD  Pager: [phone]

## 2018-06-08 NOTE — PROGRESS NOTES
Per Janny's analysis of waveforms, no DL fracture suspected. Pt will be switched back to a grounded cable.

## 2018-06-08 NOTE — PROGRESS NOTES
Nurse Care Coordination was consulted to assess needs of patient following stroke. Chart was reviewed and discussed with interdisciplinary team.  Awaiting PT/OT eval.  Pt is open to [facility] if home with service is rec.  RNCC will cont to follow plan of care.      Kandace Zambrano RN, PHN, BSN  4A and 4E/ ICU  Care Coordinator  Phone: [phone]  Pager: [phone]

## 2018-06-08 NOTE — PLAN OF CARE
Problem: Stroke (Ischemic) (Adult)  Goal: Signs and Symptoms of Listed Potential Problems Will be Absent, Minimized or Managed (Stroke)  Signs and symptoms of listed potential problems will be absent, minimized or managed by discharge/transition of care (reference Stroke (Ischemic) (Adult) CPG).   Outcome: No Change  66 y.o. M admitted with L MCA CVA. Q4hr neuro assessments, deficits unchanged, see flowsheet. LVAD numbers stable, see flowsheet. Family at bedside with pt. Will continue to monitor and support.

## 2018-06-08 NOTE — PLAN OF CARE
Problem: Patient Care Overview  Goal: Plan of Care/Patient Progress Review  PT evaluation cx and on hold.  Patient currently not following commands, OT to initiate evaluation and will address ROM and basic mobility needs. OT will advise when additional PT intervention indicated to address functional mobility.

## 2018-06-08 NOTE — PROGRESS NOTES
Cardiology Progress Note    Assessment & Plan   This is a 67 yo M with a history of ICM s/p HM II in 2012 as destination therapy with a course complicated by hemolysis, pump thrombosis and multiple strokes (subcortical, R PICA, R MCA), PFO s/p closure in 2012,MVr with carbomedics ring ICD placement in 2011, latent TB, PFO s/p closure in 2012, CKD presenting with new altered mental status and aphasia CT showing large R MCA encephalomalacia and CTA showing occluded petrous L ICA of unclear chronicity, CT scan 6/7 shows new large MCA stroke. INR on admission was 2     - heart failure 2/2 ICM s/p HM II as DT in 2012  - Stage D NYHA class III  - complicated by hemolysis, suspected pump thrombosis with persistently elevated LDH and strokes admitted with new L ICA stroke.  -PTA cardiac meds are: metoprolol XL 50mg OD, losartan 25mg OD, asa, lasix 20mg PRN- will allow permissive hypertension per neuro protocol, when possible would resume PTA medications to obtain MAP's <80   - no indication for troponin check or repeat echo since he has known pump thrombosis and is not a candidate for pump exchange or further therapies  - d/c normal saline   - Anticoagulated with coumadin pta (INR goal 1.8-2.3) INR on admission 2    - new LMCA stroke  - per neuro permissive HTN with MAPS>80  - history of seizures- continue keppra- check level    - BPH -On tamsulosin and finasteride    Needs to reassess goals of care- PEG tube would not be ideal for his quality of life and we do not think he would benefit from this in the long run given his baseline quality of life, happy to participate in a care conference to discuss this further with the family.     Will discuss with Dr. Traci Ayala  Cardiology fellow, PGY-4    Interval History   Failed swallow eval   Low speed in VAD  Started on TF    Physical Exam   Temp: 98.7  F (37.1  C) Temp src: Oral BP: 96/74   Heart Rate: 84 Resp: 18 SpO2: 100 % O2 Device: Nasal cannula  Oxygen Delivery: 4 LPM  Vitals:    06/07/18 0400 06/08/18 0400   Weight: 88.7 kg (195 lb 8.8 oz) 66.6 kg (146 lb 13.2 oz)     Vital Signs with Ranges  Temp:  [96.6  F (35.9  C)-99.3  F (37.4  C)] 98.7  F (37.1  C)  Heart Rate:  [59-86] 84  Resp:  [12-18] 18  BP: ()/() 96/74  SpO2:  [94 %-100 %] 100 %  I/O last 3 completed shifts:  In: 315 [NG/GT:215]  Out: 825 [Urine:825]    Heart Rate: 84, Blood pressure 96/74, temperature 98.7  F (37.1  C), temperature source Oral, resp. rate 18, height 1.829 m (6'), weight 66.6 kg (146 lb 13.2 oz), SpO2 100 %.  146 lbs 13.22 oz  GEN: obtunded, moving right arm not following commands, NAD.  CV:  LVAD humm   LUNGS:  Clear to auscultation bilaterally   ABD:  Active bowel sounds, soft, non-tender/non-distended.  No rebound/guarding/rigidity.  EXT:  No edema or cyanosis.      Medications     IV fluid REPLACEMENT ONLY       - MEDICATION INSTRUCTIONS -       - MEDICATION INSTRUCTIONS -       Reason ACE/ARB/ARNI order not selected       Reason beta blocker order not selected       Warfarin Therapy Reminder         aspirin  81 mg Oral or Feeding Tube Daily     doxazosin  2 mg Oral or Feeding Tube Daily     finasteride  5 mg Oral or Feeding Tube Daily     levETIRAcetam  750 mg Oral BID     lidocaine   Topical Once     melatonin  10 mg Oral or Feeding Tube At Bedtime     multivitamins with minerals  15 mL Per Feeding Tube Daily     protein modular  1 packet Per Feeding Tube TID     thiamine  100 mg Oral or Feeding Tube Daily       Data     Recent Labs  Lab 06/08/18  0352 06/06/18  2116 06/06/18  2115 06/06/18 2114 06/02/18  1214   WBC  --   --  8.4  --   --  6.4   HGB  --   --  8.5*  --   --  8.2*   MCV  --   --  95  --   --  92   PLT  --   --  219  --   --  237   INR 2.21*  --  1.94* 2.0*  < >  --      --  136  --   --  140   POTASSIUM 4.4  --  4.0  --   --  4.1   CHLORIDE 112*  --  106  --   --  112*   CO2 17*  --  21  --   --  20   BUN 28  --  26  --   --  25   CR  1.97*  --  2.14*  --   --  2.08*   ANIONGAP 13  --  8  --   --  8   JOSÉ 8.2*  --  7.9*  --   --  8.1*   *  --  153*  --   --  219*   ALBUMIN  --   --  3.2*  --   --  3.0*   PROTTOTAL  --   --  7.5  --   --  7.0   BILITOTAL  --   --  0.7  --   --  0.6   ALKPHOS  --   --  117  --   --  102   ALT  --   --  19  --   --  20   AST  --   --  50*  --   --  64*   TROPI  --   --  <0.015  --   --   --    TROPONIN  --  0.01  --   --   --   --    < > = values in this interval not displayed.    Recent Results (from the past 24 hour(s))   XR Feeding Tube Placement    Narrative    Study: XR FEEDING TUBE PLACEMENT 6/7/2018 3:13 PM    Comparison:  X-ray 5/16/2018    History: Unsuccessful attempt to place feeding tube at bedside using  Cortrak;       Fluoro time: .5 minutes     Technique: After injection of Xylocaine gel into the left nostril, a  feeding tube was advanced under fluoroscopic guidance.    Findings: The feeding tube is advanced with the tip in the  third/fourth. A small amount of barium was injected to define anatomy.  Feeding tube was secured with a bridal retention device.       Impression    Impression: Uncomplicated feeding tube placement with tip in the  third/fourth portion of the duodenum.    I, EUGENE ELIZABETH MD, attest that I was present for all critical  portions of the procedure and was immediately available to provide  guidance and assistance during the remainder of the procedure.    I have personally reviewed the examination and initial interpretation  and I agree with the findings.    EUGENE ELIZABETH MD   XR Abdomen Port 1 View    Narrative    Exam:  XR ABDOMEN PORT 1 VW, 6/7/2018 3:44 PM    History: evaluate driveline fracture please ensure there are no wires  crossing need to image driveline from pump to controller;     Comparison:  Abdominal radiograph 5/16/2018, CT abdomen 5/1/2018    Findings:  Portable supine radiograph of the abdomen. LVAD projects in  stable position over the left  upper quadrant. Internal drive line  appears intact. Not well evaluated external to the patient. Retained  enteric contrast from prior feeding tube placement. Feeding tube tip  projects over the third portion of the duodenum. Stable position of  internal biliary drain.     No abnormally dilated bowel. No pneumatosis or portal venous gas.  Partially visualized bibasilar pulmonary opacities. Chronic right  femoral neck nonunion fracture with stable superior displacement of  the right femur.      Impression    Impression:    1. Stable position of LVAD. Visualized drive line appears intact.  2. Partially visualized bibasilar pulmonary opacities.  3. Nonobstructive bowel gas pattern.    I have personally reviewed the examination and initial interpretation  and I agree with the findings.    WALE AMBRIZ MD   CT Head w/o Contrast   Result Value    Radiologist flags Stroke (Urgent)    Narrative    CT HEAD W/O CONTRAST 6/7/2018 3:57 PM    Provided History: Stroke;   ICD-10: Stroke    Comparison: CT head 6/6/2018.    Technique: Using multidetector thin collimation helical acquisition  technique, axial, coronal and sagittal CT images from the skull base  to the vertex were obtained without intravenous contrast.     Findings:    There is new loss of gray-white matter differentiation within the left  parieto-occipital lobe in the left MCA distribution. Findings are  suspicious for acute infarction. No mass effect or midline shift.  There is redemonstration of extensive encephalomalacia involving the  right frontal lobe, parietal lobe, temporal lobe, left occipital lobe  and within the right cerebellar hemisphere. No definite intracranial  hemorrhage. There is ex vacuo dilatation of the left lateral  ventricle. No evidence for hydrocephalus. Small lacunar infarction  adjacent to the left caudate. There is moderate diffuse cerebral  atrophy. There are periventricular and subcortical hypoattenuation,  which are nonspecific,  but likely represent chronic small vessel  ischemic disease. There is no new loss of gray-white matter  differentiation.. The basal cisterns are patent.    Paranasal sinuses and mastoid air cells are clear.. There is  left-sided pseudophakia.       Impression    Impression:   1. New left MCA distribution infarction.  2. Extensive encephalomalacia within the right middle cerebral artery  territory, and within the left occipital lobe and right cerebellar  hemisphere.  3. Chronic small vessel ischemic disease and moderate cerebral  atrophy.     [Urgent Result: Stroke]    Finding was identified on 6/7/2018 4:12 PM.     Germán bass M.D. was contacted by Dr. Pelayo  at 6/7/2018 5:02 PM  and verbalized understanding of the urgent finding.      I have personally reviewed the examination and initial interpretation  and I agree with the findings.    KOBY GONZALES MD

## 2018-06-08 NOTE — CONSULTS
Request for gastrostomy tube placement.  6/6/18 CTA  image confirmed colon and LVAD anterior to stomach with no safe tract to stomach  IR defers Gastrostomy tube placement.    Carmencita Trinidad IR RPA  [phone] Call pager  [phone] pager

## 2018-06-08 NOTE — PROGRESS NOTES
[facility]  Patient is currently open to home care services with [facility].  The patient is currently receiving RN Palliative services.  [facility]  and team have been notified of patient admission.  [facility] liaison will continue to follow patient during stay.  If appropriate provide orders to resume home care at time of discharge.    Thank you  Melissa Sifuentes RN, BSN  [facility] Liaison  [phone]

## 2018-06-08 NOTE — PLAN OF CARE
Problem: Patient Care Overview  Goal: Plan of Care/Patient Progress Review  SLP: Consulted with RN and patient not appropriate for treatment this date. Will reschedule for 6-9-2018.

## 2018-06-08 NOTE — PROGRESS NOTES
Indication of Interrogation:  Admission    Type of VAD:  Heartmate 2    Current Parameters:  Flow= --- lpm, Speed= 8800 rpm, Power= 5.3 capps, PI (if applicable)= 5.9    Abnormal Alarm on History:  Yes, explain: one low speed operation speed down to 7930, 1 power/flow spike     Abnormal Events/Parameters Notes:  Yes, explain: frequent PI events    Changes Made during Interrogation:  Yes, explain: Switched Pt to an ungrounded cable. Waveforms sent to [facility], x-rays obtained to further assess potential DL fracture.

## 2018-06-08 NOTE — PLAN OF CARE
Problem: Patient Care Overview  Goal: Plan of Care/Patient Progress Review  OT:  Attempted to see pt at scheduled time.  Pt not following commands despite multiple attempts.  Spoke to pt's dtr at bedside, will attempt to see pt on Monday if pt with increased command following or to initiate ROM.

## 2018-06-08 NOTE — PROGRESS NOTES
[facility] Care Coordination Contact  CM notified client was admitted to the hospital with CVA. CM spoke to client's nurse and he is minimally responsive and confused. Family is with him at all times. He may be transferring to medical floor later today. CM notified [facility] and client's PCA agency of his admission to the hospital.   Nena Salazar RN  [facility]   [phone]

## 2018-06-08 NOTE — PLAN OF CARE
"Problem: Patient Care Overview  Goal: Plan of Care/Patient Progress Review  Pt is drowsy; arouses to repeated verbal stimuli. According to family, pt is \"somewhat oriented\"; pt responds when family tells him to cough but does not follow other verbal commands from family or staff--no new neuro changes noted. Pt's vitals were stable all throughout shift. Pt was weaned down to RA and sats WDL (high 90s.) Pt's lungs are coarse, coughs frequently; productive of moderate, yellowish, thick sputum. Pt does not tolerate HOB lower than 30 even during turns; please continue to encourage family to keep HOB at 30 degrees or higher. Pt was given stool softener; LBM reported was on Tuesday. IR was consulted for possible PEG tube; will readdress issue with family on Monday as IR deferred plan. As of now, family would like to go ahead with any option for nutrition therapy. LVAD numbers WDL.       "

## 2018-06-09 NOTE — CONSULTS
[facility], [place]  Vascular Neurology Progress Note  06/08/2018    Patient Name: Sohan Rendon  Admission Date: 6/6/2018  Date of Service: 06/08/2018  Current hospital day: Hospital Day: 3  Reason for Consult : Suspected LT MCA stroke  Chief Complaint : Aphasia  Primary Care Physician  :   Shilpi Agosto  Code Status   Full Code    24 hours events:  Patient is stable, CT head show LT MCA distribution stroke, has failed swallow evaluation, still aphasic , plan for PEG by GI at some time next week, Plan for a family meeting next week  With cardiology, Neurology and GI regarding goals of care.    Problem List:  LVAD thrombus on Anticoagulation   Aphasia 2/2 New LT MCA Stroke Distribution       Assessment:  Sohan Rendon is a 68 year old male with PMH of HTN, CHF on LVAD with known thrombus on Anticoagulation warfarin, RT MCA stroke, who presents with New onset aphasia  Found to have LT parietal ischemic infarct, etiology likely LT ICA thrombus most likley 2/2 LVAD Thrombus. TPA was no given for High INR and plan for no intervention because of MRS 4 at baseline.     Pertinent Studies  LDL 92  A1C 4.7  CT Head 6/7 shows LT Parietal Stroke    Recommendation:  # Global aphasia  # L petrous ICA occlusion, unclear chronicity, not seen on CTA 2014  - Neurochecks Q 4 hr  - Permissive HTN; labetalol PRN for MAPs >80  - Euthermia, Euglycemia  - Daily aspirin for secondary stroke prevention  - Statin  - Telemetry, EKG  - Bedside Glucose Monitoring  - PT/OT/SLP  - PM&R  - Stroke Education  - Depression Screen  - Apnea Screen   - Plan for PEG by GI at some time next week   - Plan for a family meeting next week  With cardiology, Neurology and GI regarding goals of care    # LVAD with Known Thrombus, INR 2.0 on admission  # CHF   - Lasix PRN held   - Pharmacy consult for Warfarin Dosing      # H/o seizures  - Keppra 750 mg BID  - Check Keppra level     # HTN  - hold home metoprolol, losartan      #  BPH  - Tamsulosin, finasteride      Other home meds:  - thiamine        NPO  IV  cc/hr  Prophylaxis          For VTE Prevention:  - warfarin   - ASA 81 mg  - Senna  Code: Full  Dispo: 1-2 days to group home  Other Medical Problems:    Activity: As tolerated  Diet: Per Speech therapy recs  DVT ppx: SCDs   Disposition: Per PT/OT Recs    Plan discussed with Dr. Blackmon    Thanks for involving the Stroke team in the care of the patient, please if you have any questions feel free to contact the Stroke team.     The patient, The primary team & the nursing staff are updated with the plan.    Karly Garcia   Neurocritical care fellow  P: [phone]    Past Medical History    I have reviewed this patient's medical history and updated it with pertinent information if needed.   Past Medical History:   Diagnosis Date     Acute right MCA stroke (H) 6/2013    With L sided hemiparesis      Anemia of chronic disease     Baseline Hb 8-9     BPH (benign prostatic hyperplasia)      CHF (congestive heart failure), NYHA class IV (H) 10/9/2012    s/p HeartMate II.  Was on milrinone and dobutamine prior to LVAD      CKD (chronic kidney disease)     Baseline Cr=     Clostridium difficile colitis 12/2012      Dysphagia     PEG tube placed in 2012.  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  Normal carotid duplex 3/2014.       Esophagitis 12/2012    EGD with esophagitis and multiple douenal ulcers     Fracture of femoral neck, right, closed (H) 2/3/2015    Presumed pathologic as patient is non-weight-bearing and suffered no trauma.  Family declined operative repair during hospital stay 1/23 - 1/30/15.     Gastric and duodenal angiodysplasia with hemorrhage 6/18/2015     GERD (gastroesophageal reflux disease)      Gout      Hematuria      HTN (hypertension)     LDL=59 (3/29/2014)     Hyperlipaemia      Ischemic cardiomyopathy      Mitral  regurgitation     s/p MVR with Carbomedics ring      Myocardial infarction 1998    In [facility] without intervention      Nonsenile cataract     BE     PFO (patent foramen ovale)     s/p closure (10/9/2012)     TB lung, latent     s/p 9 months INH in 2012          Prior to Admission Medications   Prior to Admission Medications   Prescriptions Last Dose Informant Patient Reported? Taking?   ALEK CONTOUR test strip   No No   Sig: USE TO TEST BLOOD SUGAR 2 TIMES DAILY OR AS DIRECTED.   Melatonin 10 MG TABS tablet 6/5/2018 at PM  No No   Sig: Take 1 tablet (10 mg) by mouth At Bedtime   Metoprolol Succinate (TOPROL XL PO) 6/6/2018 at AM Daughter Yes Yes   Sig: Take 50 mg by mouth daily    ORDER FOR DME   No No   Sig: Equipment being ordered: Lift chair.    Please see indications and face-to-face encounter details in 2/3/15 Office Visit note.   acetaminophen (TYLENOL) 325 MG tablet 6/6/2018 at 1300  Yes Yes   Sig: Take 2 tablets (650 mg) by mouth every 6 hours as needed for mild pain   aspirin 81 MG tablet 6/6/2018 at Unknown time  No Yes   Sig: Take 1 tablet (81 mg) by mouth daily   bisacodyl (DULCOLAX) 10 MG Suppository Past Week at Unknown time Daughter No Yes   Sig: Place 1 suppository (10 mg) rectally daily as needed for constipation   blood glucose monitoring (ALEK MICROLET) lancets   No No   Sig: USE TO TEST BLOOD SUGAR 2 TIMES DAILY OR AS DIRECTED   blood glucose monitoring (NO BRAND SPECIFIED) lancets   No No   Sig: Use to test blood sugars 2 times daily or as directed.   finasteride (PROSCAR) 5 MG tablet 6/6/2018 at AM Daughter No Yes   Sig: Take 1 tablet (5 mg) by mouth daily   furosemide (LASIX) 20 MG tablet 6/6/2018 at Unknown time Daughter No Yes   Sig: Take 1 tablet (20 mg) by mouth as needed   levETIRAcetam (KEPPRA) 100 MG/ML solution 6/6/2018 at AM  No Yes   Sig: Take 7.5 mLs (750 mg) by mouth every 12 hours   loratadine (CLARITIN) 10 MG tablet 6/6/2018 at Unknown time  No Yes   Sig: TAKE 1 TABLET (10 MG)  BY MOUTH DAILY   losartan (COZAAR) 25 MG tablet 6/6/2018 at Unknown time  Yes Yes   Sig: Take 1 tablet (25 mg) by mouth daily   nitroGLYcerin (NITROSTAT) 0.4 MG sublingual tablet   No Yes   Sig: Place 1 tablet (0.4 mg) under the tongue every 5 minutes as needed for chest pain   nystatin-triamcinolone (MYCOLOG II) cream 6/5/2018  No No   Sig: Apply 1 g topically 2 times daily for 10 days   order for DME   No No   Sig: Equipment being ordered: Bilateral leg supports for manual wheelchair.   order for DME   No No   Sig: Equipment being ordered: Reclining manual w/c with bilateral elevating leg rests.   order for DME   No No   Sig: Equipment being ordered: Hospital Bed, fully electric.   order for DME   No No   Sig: Equipment being ordered: Wheelchair, manual.   order for DME   No No   Sig: Equipment being ordered: Wheelchair, manual, with elevated leg rests and tilt in space back.  Please fax to [employer]; I called them 11/26/16 and they requested the new order.  Face to face is in my 10/26/16 note.   order for DME   No No   Sig: Equipment being ordered: Cushioned heel boots.   order for DME   No No   Sig: Bilateral heel protective cushioning boots.  Dx: previous stroke with left hemiplegia.  Duration of need: 99 months.   order for DME   No No   Sig: Equipment being ordered: Nebulizer   pantoprazole (PROTONIX) 20 MG EC tablet 6/6/2018 at AM  No Yes   Sig: Take 1 tablet (20 mg) by mouth every morning (before breakfast)   senna-docusate (SENOKOT-S;PERICOLACE) 8.6-50 MG per tablet Past Week at Unknown time  Yes Yes   Sig: Take 1-2 tablets by mouth 2 times daily as needed for constipation   simethicone (MYLICON) 80 MG chewable tablet Past Month at Unknown time  Yes Yes   Sig: Take 80 mg by mouth every 6 hours as needed for flatulence or cramping   tamsulosin (FLOMAX) 0.4 MG capsule 6/6/2018 at Unknown time  No Yes   Sig: Take 2 capsules (0.8 mg) by mouth daily   thiamine (VITAMIN B-1) 100 MG tablet 6/6/2018 at Unknown time   "No Yes   Sig: Take 1 tablet (100 mg) by mouth daily   warfarin (COUMADIN) 1 MG tablet 6/6/2018 at 0700  No Yes   Sig: Take 3 tablets (3 mg) by mouth daily Or as directed after INR dosing changes      Facility-Administered Medications: None       Medications     IV fluid REPLACEMENT ONLY       - MEDICATION INSTRUCTIONS -       - MEDICATION INSTRUCTIONS -       Reason ACE/ARB/ARNI order not selected       Reason beta blocker order not selected       Warfarin Therapy Reminder         aspirin  81 mg Oral or Feeding Tube Daily     doxazosin  2 mg Oral or Feeding Tube Daily     finasteride  5 mg Oral or Feeding Tube Daily     levETIRAcetam  750 mg Oral BID     lidocaine   Topical Once     melatonin  10 mg Oral or Feeding Tube At Bedtime     multivitamins with minerals  15 mL Per Feeding Tube Daily     protein modular  1 packet Per Feeding Tube TID     thiamine  100 mg Oral or Feeding Tube Daily         Physical Exam   Objective:  Temp:  [98.7  F (37.1  C)-99.3  F (37.4  C)] 99.1  F (37.3  C)  Heart Rate:  [72-90] 89  Resp:  [12-18] 18  BP: ()/(62-82) 109/77  SpO2:  [98 %-100 %] 100 %  No Data Recorded    General:  Lying in bed, squeezing eyes closed, moving the right side spontaneously  HEENT:  normocephalic/atraumatic, Mild epistaxis  Cardio:  RRR  Pulmonary:  no respiratory distress  Abdomen:  soft  Extremities:  no edema  Skin:  warm/dry      Neurologic  Mental Status:  Lying in bed with his eyes closed, resisted knee exam, very little speech output, only intermittently saying \"get off me\", not able to say his name or follow commands.  Cranial Nerves:  PERRL, EOMI with normal smooth pursuit, facial movements symmetric, no dysarthria, tongue protrusion midline  Motor:  no abnormal movements, increaed tone in LUE, LUE/LLE plegic but withdraw minimally to pain. RLE 2/5, RUE 5/5. No abnormal movements.  Reflexes:  2+ and symmetric throughout, no clonus, left toe up  Sensory:  withdraws in all four  Coordination:  " cannot assess due to mental status  Station/Gait:  unable to test    Data       Labs:  CBC  Recent Labs  Lab 06/08/18  1923 06/06/18 2115 06/02/18  1214   WBC 10.2 8.4 6.4   HGB 8.5* 8.5* 8.2*    219 237       COAGS  Recent Labs  Lab 06/08/18  0352 06/06/18 2115 06/06/18 2114 06/04/18   INR 2.21* 1.94* 2.0* 1.8*   PTT  --  51*  --   --        INR:  Recent Labs   Lab Test  06/08/18   0352  06/06/18 2115 06/06/18 2114 06/04/18 06/01/18   INR  2.21*  1.94*  2.0*  1.8*  1.5        BMP  Recent Labs  Lab 06/08/18  0352 06/06/18 2115 06/02/18  1214    136 140   POTASSIUM 4.4 4.0 4.1   CHLORIDE 112* 106 112*   CO2 17* 21 20   BUN 28 26 25   CR 1.97* 2.14* 2.08*   JOSÉ 8.2* 7.9* 8.1*       Liver panel:  Recent Labs   Lab Test  06/06/18   2115 06/02/18   1214  05/31/18   0432  05/10/18   1045  05/04/18   1322   PROTTOTAL  7.5  7.0  7.2  7.4  7.4   ALBUMIN  3.2*  3.0*  3.1*  3.1*  3.2*   BILITOTAL  0.7  0.6  0.6  0.5  0.6   ALKPHOS  117  102  105  95  93   AST  50*  64*  Unsatisfactory specimen - hemolyzed  43  59*   ALT  19  20  23  16  16       ABGNo results for input(s): PH, PCO2, PO2, HCO3 in the last 168 hours.    CRP/ESRNo results for input(s): CRP in the last 168 hours.    Invalid input(s): ESR    CSFNo results for input(s): CGLU, CTP in the last 168 hours.    Invalid input(s): CCSF    MICRONo results for input(s): CULT in the last 168 hours.    LIPID Profile:   Cholesterol   Date Value Ref Range Status   06/08/2018 152 <200 mg/dL Final   05/10/2018 151 <200 mg/dL Final     HDL Cholesterol   Date Value Ref Range Status   06/08/2018 38 (L) >39 mg/dL Final   05/10/2018 35 (L) >39 mg/dL Final     LDL Cholesterol Calculated   Date Value Ref Range Status   06/08/2018 92 <100 mg/dL Final     Comment:     Desirable:       <100 mg/dl   05/10/2018 90 <100 mg/dL Final     Comment:     Desirable:       <100 mg/dl     Triglycerides   Date Value Ref Range Status   06/08/2018 113 <150 mg/dL Final   05/10/2018  131 <150 mg/dL Final     No results found for: CHOLHDLRATIO    A1C:   Recent Labs   Lab Test  06/07/18   0555  08/04/17   0830  07/21/16   1119  09/03/14   0648   A1C  4.7  4.8  5.2  8.0*       Troponin I:   Recent Labs   Lab Test  06/06/18   2115  05/10/18   1045  05/02/18   0049  03/07/18   1555  11/22/17   1330  05/03/17   0350  05/02/17   2247  02/11/17   0651   TROPI  <0.015  <0.015  <0.015  <0.015  <0.015  <0.015  The 99th percentile for upper reference range is 0.045 ug/L.  Troponin values in   the range of 0.045 - 0.120 ug/L may be associated with risks of adverse   clinical events.    <0.015  The 99th percentile for upper reference range is 0.045 ug/L.  Troponin values in   the range of 0.045 - 0.120 ug/L may be associated with risks of adverse   clinical events.    <0.015  The 99th percentile for upper reference range is 0.045 ug/L.  Troponin values in   the range of 0.045 - 0.120 ug/L may be associated with risks of adverse   clinical events.         Imaging  No results found for this or any previous visit (from the past 24 hour(s)).

## 2018-06-09 NOTE — PLAN OF CARE
Problem: Patient Care Overview  Goal: Plan of Care/Patient Progress Review  Outcome: No Change  Neuro - Pupils equal, reactive, pt doesn't follow commands (per family), pt moves right upper extremity(pulls at NJ, patrick in place). Afebrile.   Cardiac - HM2 numbers unchanged (see flow sheet). MAPs 70-90's.  Respiratory - RA, diminished lung sounds, thick secretions coughed up at beginning of shift, decreased later.  GI - TF at goal through NJ at 50ml/hr, No BM, active bowel sounds, passing gas.   - Condom catheter in place, good UOP  Family - Present at bedside, involved in care.  Plan - Possible PEG/family conference next week. Transfer upstairs when bed available. Continue to monitor and notify MD of questions or concerns.

## 2018-06-09 NOTE — PLAN OF CARE
Problem: Patient Care Overview  Goal: Plan of Care/Patient Progress Review  ST 4E: Cx- Pt not appropriate to participate with ST this date. Will reschedule

## 2018-06-09 NOTE — PROGRESS NOTES
[facility], [place]  Vascular Neurology Progress Note  06/09/2018    Patient Name: Sohan Rendon  Admission Date: 6/6/2018  Date of Service: 06/09/2018  Current hospital day: Hospital Day: 4  Reason for Consult : Suspected LT MCA stroke  Chief Complaint : Aphasia  Primary Care Physician  :   Shilpi Agosto  Code Status   Full Code    24 hours events:  Patient continues to be drowsy and pull at [place] etc. Family conference on Monday.    Problem List:  LVAD thrombus on Anticoagulation   Aphasia 2/2 New LT MCA Stroke Distribution     Assessment:  Sohan Rendon is a 68 year old male with PMH of HTN, CHF on LVAD with known thrombus on Anticoagulation warfarin, RT MCA stroke, who presents with New onset aphasia  Found to have LT parietal ischemic infarct, etiology likely LT ICA thrombus most likley 2/2 LVAD Thrombus. TPA was not given for High INR and plan for no intervention because of MRS 4 at baseline.     Pertinent Studies  LDL 92  A1C 4.7  CT Head 6/7 shows LT Parietal Stroke    Recommendation:  # Global aphasia  # L petrous ICA occlusion, unclear chronicity, not seen on CTA 2014  - Neurochecks Q 4 hr  - Permissive HTN; labetalol PRN for MAPs >80  - Euthermia, Euglycemia  - Daily aspirin for secondary stroke prevention  - Statin  - Telemetry, EKG  - Bedside Glucose Monitoring  - PT/OT/SLP  - PM&R  - Stroke Education  - Depression Screen  - Apnea Screen   - Consulted GI for PEG who said in their note that they will reevaluate on Monday and recommended to continue NJ feeds over the weekend. Also procedure would require to hold the anticoagulation.  - Plan for a family meeting next week  With cardiology, Neurology and GI regarding goals of care    # LVAD with Known Thrombosis- not a candidate for pump exchange or further therapies per cardiology, INR 2.0 on admission  # CHF   - Lasix PRN held   - Pharmacy consulted for Warfarin Dosing      # H/o seizures  - Keppra 750 mg BID  - Check Keppra  level- pending.     # HTN  - hold home metoprolol, losartan      # BPH  - Tamsulosin, finasteride      Other home meds:  - thiamine        NPO  IV  cc/hr  Prophylaxis          For VTE Prevention:  - warfarin   - ASA 81 mg  - Senna  Code: Full  Dispo: Floor today.    Activity: As tolerated  Diet: Per Speech therapy recs  DVT ppx: SCDs   Disposition: Per PT/OT Recs    Plan discussed with Dr. Blackmon      Past Medical History    I have reviewed this patient's medical history and updated it with pertinent information if needed.   Past Medical History:   Diagnosis Date     Acute right MCA stroke (H) 6/2013    With L sided hemiparesis      Anemia of chronic disease     Baseline Hb 8-9     BPH (benign prostatic hyperplasia)      CHF (congestive heart failure), NYHA class IV (H) 10/9/2012    s/p HeartMate II.  Was on milrinone and dobutamine prior to LVAD      CKD (chronic kidney disease)     Baseline Cr=     Clostridium difficile colitis 12/2012      Dysphagia     PEG tube placed in 2012.  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  Normal carotid duplex 3/2014.       Esophagitis 12/2012    EGD with esophagitis and multiple douenal ulcers     Fracture of femoral neck, right, closed (H) 2/3/2015    Presumed pathologic as patient is non-weight-bearing and suffered no trauma.  Family declined operative repair during hospital stay 1/23 - 1/30/15.     Gastric and duodenal angiodysplasia with hemorrhage 6/18/2015     GERD (gastroesophageal reflux disease)      Gout      Hematuria      HTN (hypertension)     LDL=59 (3/29/2014)     Hyperlipaemia      Ischemic cardiomyopathy      Mitral regurgitation     s/p MVR with Carbomedics ring      Myocardial infarction 1998    In [facility] without intervention      Nonsenile cataract     BE     PFO (patent foramen ovale)     s/p closure (10/9/2012)     TB lung, latent     s/p 9 months INH in  2012          Prior to Admission Medications   Prior to Admission Medications   Prescriptions Last Dose Informant Patient Reported? Taking?   ALEK CONTOUR test strip   No No   Sig: USE TO TEST BLOOD SUGAR 2 TIMES DAILY OR AS DIRECTED.   Melatonin 10 MG TABS tablet 6/5/2018 at PM  No No   Sig: Take 1 tablet (10 mg) by mouth At Bedtime   Metoprolol Succinate (TOPROL XL PO) 6/6/2018 at AM Daughter Yes Yes   Sig: Take 50 mg by mouth daily    ORDER FOR DME   No No   Sig: Equipment being ordered: Lift chair.    Please see indications and face-to-face encounter details in 2/3/15 Office Visit note.   acetaminophen (TYLENOL) 325 MG tablet 6/6/2018 at 1300  Yes Yes   Sig: Take 2 tablets (650 mg) by mouth every 6 hours as needed for mild pain   aspirin 81 MG tablet 6/6/2018 at Unknown time  No Yes   Sig: Take 1 tablet (81 mg) by mouth daily   bisacodyl (DULCOLAX) 10 MG Suppository Past Week at Unknown time Daughter No Yes   Sig: Place 1 suppository (10 mg) rectally daily as needed for constipation   blood glucose monitoring (ALEK MICROLET) lancets   No No   Sig: USE TO TEST BLOOD SUGAR 2 TIMES DAILY OR AS DIRECTED   blood glucose monitoring (NO BRAND SPECIFIED) lancets   No No   Sig: Use to test blood sugars 2 times daily or as directed.   finasteride (PROSCAR) 5 MG tablet 6/6/2018 at AM Daughter No Yes   Sig: Take 1 tablet (5 mg) by mouth daily   furosemide (LASIX) 20 MG tablet 6/6/2018 at Unknown time Daughter No Yes   Sig: Take 1 tablet (20 mg) by mouth as needed   levETIRAcetam (KEPPRA) 100 MG/ML solution 6/6/2018 at AM  No Yes   Sig: Take 7.5 mLs (750 mg) by mouth every 12 hours   loratadine (CLARITIN) 10 MG tablet 6/6/2018 at Unknown time  No Yes   Sig: TAKE 1 TABLET (10 MG) BY MOUTH DAILY   losartan (COZAAR) 25 MG tablet 6/6/2018 at Unknown time  Yes Yes   Sig: Take 1 tablet (25 mg) by mouth daily   nitroGLYcerin (NITROSTAT) 0.4 MG sublingual tablet   No Yes   Sig: Place 1 tablet (0.4 mg) under the tongue every 5  minutes as needed for chest pain   nystatin-triamcinolone (MYCOLOG II) cream 6/5/2018  No No   Sig: Apply 1 g topically 2 times daily for 10 days   order for DME   No No   Sig: Equipment being ordered: Bilateral leg supports for manual wheelchair.   order for DME   No No   Sig: Equipment being ordered: Reclining manual w/c with bilateral elevating leg rests.   order for DME   No No   Sig: Equipment being ordered: Hospital Bed, fully electric.   order for DME   No No   Sig: Equipment being ordered: Wheelchair, manual.   order for DME   No No   Sig: Equipment being ordered: Wheelchair, manual, with elevated leg rests and tilt in space back.  Please fax to [employer]; I called them 11/26/16 and they requested the new order.  Face to face is in my 10/26/16 note.   order for DME   No No   Sig: Equipment being ordered: Cushioned heel boots.   order for DME   No No   Sig: Bilateral heel protective cushioning boots.  Dx: previous stroke with left hemiplegia.  Duration of need: 99 months.   order for DME   No No   Sig: Equipment being ordered: Nebulizer   pantoprazole (PROTONIX) 20 MG EC tablet 6/6/2018 at AM  No Yes   Sig: Take 1 tablet (20 mg) by mouth every morning (before breakfast)   senna-docusate (SENOKOT-S;PERICOLACE) 8.6-50 MG per tablet Past Week at Unknown time  Yes Yes   Sig: Take 1-2 tablets by mouth 2 times daily as needed for constipation   simethicone (MYLICON) 80 MG chewable tablet Past Month at Unknown time  Yes Yes   Sig: Take 80 mg by mouth every 6 hours as needed for flatulence or cramping   tamsulosin (FLOMAX) 0.4 MG capsule 6/6/2018 at Unknown time  No Yes   Sig: Take 2 capsules (0.8 mg) by mouth daily   thiamine (VITAMIN B-1) 100 MG tablet 6/6/2018 at Unknown time  No Yes   Sig: Take 1 tablet (100 mg) by mouth daily   warfarin (COUMADIN) 1 MG tablet 6/6/2018 at 0700  No Yes   Sig: Take 3 tablets (3 mg) by mouth daily Or as directed after INR dosing changes      Facility-Administered Medications: None  "      Medications     IV fluid REPLACEMENT ONLY       - MEDICATION INSTRUCTIONS -       - MEDICATION INSTRUCTIONS -       Reason ACE/ARB/ARNI order not selected       Reason beta blocker order not selected       Warfarin Therapy Reminder         aspirin  81 mg Oral or Feeding Tube Daily     doxazosin  2 mg Oral or Feeding Tube Daily     finasteride  5 mg Oral or Feeding Tube Daily     levETIRAcetam  750 mg Oral BID     melatonin  10 mg Oral or Feeding Tube At Bedtime     multivitamins with minerals  15 mL Per Feeding Tube Daily     protein modular  1 packet Per Feeding Tube TID     thiamine  100 mg Oral or Feeding Tube Daily     warfarin  0.5 mg Oral ONCE at 18:00         Physical Exam   Objective:  Temp:  [97.8  F (36.6  C)-99.1  F (37.3  C)] 97.8  F (36.6  C)  Heart Rate:  [77-98] 90  Resp:  [18] 18  BP: ()/(45-79) 98/45  SpO2:  [97 %-100 %] 100 %  No Data Recorded    General:  Lying in bed, squeezing eyes closed, moving the right side spontaneously  HEENT:  normocephalic/atraumatic, Mild epistaxis  Cardio:  RRR  Pulmonary:  no respiratory distress  Abdomen:  soft  Extremities:  no edema  Skin:  warm/dry      Neurologic  Mental Status:  Lying in bed with his eyes closed, resisted knee exam, very little speech output, only intermittently saying \"get off me\", not able to say his name or follow commands.  Cranial Nerves:  PERRL, EOMI with normal smooth pursuit, facial movements symmetric, no dysarthria, tongue protrusion midline  Motor:  no abnormal movements, increaed tone in LUE, LUE/LLE plegic but withdraw minimally to pain. RLE 2/5, RUE 5/5. No abnormal movements.  Reflexes:  2+ and symmetric throughout, no clonus, left toe up  Sensory:  withdraws in all four  Coordination:  cannot assess due to mental status  Station/Gait:  unable to test    Data       Labs:  CBC    Recent Labs  Lab 06/08/18  1923 06/06/18  2115 06/02/18  1214   WBC 10.2 8.4 6.4   HGB 8.5* 8.5* 8.2*    219 237       COAGS    Recent " Labs  Lab 06/09/18  0527 06/08/18  0352 06/06/18 2115 06/06/18  2114   INR 2.33* 2.21* 1.94* 2.0*   PTT  --   --  51*  --        INR:  Recent Labs   Lab Test  06/09/18   0527  06/08/18   0352  06/06/18 2115 06/06/18   2114 06/04/18   INR  2.33*  2.21*  1.94*  2.0*  1.8*        BMP    Recent Labs  Lab 06/08/18  0352 06/06/18  2115 06/02/18  1214    136 140   POTASSIUM 4.4 4.0 4.1   CHLORIDE 112* 106 112*   CO2 17* 21 20   BUN 28 26 25   CR 1.97* 2.14* 2.08*   JOSÉ 8.2* 7.9* 8.1*       Liver panel:  Recent Labs   Lab Test  06/06/18 2115 06/02/18   1214  05/31/18   0432  05/10/18   1045  05/04/18   1322   PROTTOTAL  7.5  7.0  7.2  7.4  7.4   ALBUMIN  3.2*  3.0*  3.1*  3.1*  3.2*   BILITOTAL  0.7  0.6  0.6  0.5  0.6   ALKPHOS  117  102  105  95  93   AST  50*  64*  Unsatisfactory specimen - hemolyzed  43  59*   ALT  19  20  23  16  16       ABGNo results for input(s): PH, PCO2, PO2, HCO3 in the last 168 hours.    CRP/ESRNo results for input(s): CRP in the last 168 hours.    Invalid input(s): ESR    CSFNo results for input(s): CGLU, CTP in the last 168 hours.    Invalid input(s): CCSF    MICRONo results for input(s): CULT in the last 168 hours.    LIPID Profile:   Cholesterol   Date Value Ref Range Status   06/08/2018 152 <200 mg/dL Final   05/10/2018 151 <200 mg/dL Final     HDL Cholesterol   Date Value Ref Range Status   06/08/2018 38 (L) >39 mg/dL Final   05/10/2018 35 (L) >39 mg/dL Final     LDL Cholesterol Calculated   Date Value Ref Range Status   06/08/2018 92 <100 mg/dL Final     Comment:     Desirable:       <100 mg/dl   05/10/2018 90 <100 mg/dL Final     Comment:     Desirable:       <100 mg/dl     Triglycerides   Date Value Ref Range Status   06/08/2018 113 <150 mg/dL Final   05/10/2018 131 <150 mg/dL Final     No results found for: CHOLHDLRATIO    A1C:   Recent Labs   Lab Test  06/07/18   0555  08/04/17   0830  07/21/16   1119  09/03/14   0648   A1C  4.7  4.8  5.2  8.0*       Troponin I:   Recent  Labs   Lab Test  06/06/18   2115  05/10/18   1045  05/02/18   0049  03/07/18   1555  11/22/17   1330  05/03/17   0350  05/02/17   2247  02/11/17   0651   TROPI  <0.015  <0.015  <0.015  <0.015  <0.015  <0.015  The 99th percentile for upper reference range is 0.045 ug/L.  Troponin values in   the range of 0.045 - 0.120 ug/L may be associated with risks of adverse   clinical events.    <0.015  The 99th percentile for upper reference range is 0.045 ug/L.  Troponin values in   the range of 0.045 - 0.120 ug/L may be associated with risks of adverse   clinical events.    <0.015  The 99th percentile for upper reference range is 0.045 ug/L.  Troponin values in   the range of 0.045 - 0.120 ug/L may be associated with risks of adverse   clinical events.         Imaging  No results found for this or any previous visit (from the past 24 hour(s)).      Oscar Mathis MD  10:48 AM June 9, 2018  Vascular Neurology Fellow.

## 2018-06-10 NOTE — PLAN OF CARE
Problem: Stroke (Ischemic) (Adult)  Goal: Signs and Symptoms of Listed Potential Problems Will be Absent, Minimized or Managed (Stroke)  Signs and symptoms of listed potential problems will be absent, minimized or managed by discharge/transition of care (reference Stroke (Ischemic) (Adult) CPG).   Outcome: No Change   06/09/18 1249   Stroke (Ischemic)   Problems Assessed (Stroke (Ischemic)/TIA) all   Problems Present (Stroke Ischemic/TIA) acute neurologic deterioration;cognitive impairment;communication impairment;eating/swallowing impairment;motor/sensory impairment;muscle tone abnormal     D/I/A:  Aphasia/AMS 2/2 new L-MCA stroke. Opens eyes spontaneously, but unable to follow commands. PERRL. SR/ST+1AVB (90's - 110's) w/ occs. PVCs, PACs. SaO2 >95% RA. TF at goal of 50 cc/hr. AM phos = 1.8, recheck ordered for 22:00. LVAD WNL at 8000 rpm, flow ---, PI and power in the 5's. No VAD alarms during shift.     Plan:  Monitor mental status, lytes. Notify provider of changes in prep for transfer to

## 2018-06-10 NOTE — PROGRESS NOTES
[facility], [place]  Vascular Neurology Progress Note  06/10/2018    Patient Name: Sohan Rendon  Admission Date: 6/6/2018  Date of Service: 06/10/2018  Current hospital day: Hospital Day: 5  Reason for Consult : Suspected LT MCA stroke  Chief Complaint : Aphasia  Primary Care Physician  :   Shilpi Agosto  Code Status   Full Code    24 hours events:  Leukocytosis trending up. UA positive. Floor status.    Problem List:  LVAD thrombus on Anticoagulation   Aphasia 2/2 New LT MCA Stroke Distribution     Assessment:  Sohan Rendon is a 68 year old male with PMH of HTN, CHF on LVAD with known thrombus on Anticoagulation warfarin, RT MCA stroke, who presents with New onset aphasia  Found to have LT parietal ischemic infarct, etiology likely LT ICA thrombus most likley 2/2 LVAD Thrombus. TPA was not given for High INR and plan for no intervention because of MRS 4 at baseline.     Pertinent Studies  LDL 92  A1C 4.7  CT Head 6/7 shows LT Parietal Stroke    Recommendation:  # Global aphasia  # L petrous ICA occlusion, unclear chronicity, not seen on CTA 2014  - Neurochecks Q 4 hr  - Permissive HTN; labetalol PRN for MAPs >80  - Euthermia, Euglycemia  - Daily aspirin for secondary stroke prevention  - Statin  - Telemetry, EKG  - Bedside Glucose Monitoring  - PT/OT/SLP  - PM&R  - Stroke Education  - Depression Screen  - Apnea Screen   - Consulted GI for PEG who said in their note that they will reevaluate on Monday and recommended to continue NJ feeds over the weekend. Also procedure would require to hold the anticoagulation.  - Plan for a family meeting next week  With cardiology, Neurology and GI regarding goals of care    # LVAD with Known Thrombosis- not a candidate for pump exchange or further therapies per cardiology, INR 2.0 on admission  # CHF   - Lasix PRN held   - Pharmacy consulted for Warfarin Dosing      # H/o seizures  - Keppra 750 mg BID  - Check Keppra level- pending.    #  Hyperglycemia  SSI started today.    # UTI  Started Ceftriaxone today    # HTN  - hold home metoprolol, losartan      # BPH  - Tamsulosin, finasteride      Other home meds:  - thiamine        NPO  Sliding scale insulin.  IV  cc/hr  Prophylaxis          For VTE Prevention:  - warfarin   - ASA 81 mg  - Senna  Code: Full  Dispo: Floor today.    Activity: As tolerated  Diet: Per Speech therapy recs  DVT ppx: SCDs   Disposition: Per PT/OT Recs    Plan discussed with Dr. Blackmon      Past Medical History    I have reviewed this patient's medical history and updated it with pertinent information if needed.   Past Medical History:   Diagnosis Date     Acute right MCA stroke (H) 6/2013    With L sided hemiparesis      Anemia of chronic disease     Baseline Hb 8-9     BPH (benign prostatic hyperplasia)      CHF (congestive heart failure), NYHA class IV (H) 10/9/2012    s/p HeartMate II.  Was on milrinone and dobutamine prior to LVAD      CKD (chronic kidney disease)     Baseline Cr=     Clostridium difficile colitis 12/2012      Dysphagia     PEG tube placed in 2012.  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  Normal carotid duplex 3/2014.       Esophagitis 12/2012    EGD with esophagitis and multiple douenal ulcers     Fracture of femoral neck, right, closed (H) 2/3/2015    Presumed pathologic as patient is non-weight-bearing and suffered no trauma.  Family declined operative repair during hospital stay 1/23 - 1/30/15.     Gastric and duodenal angiodysplasia with hemorrhage 6/18/2015     GERD (gastroesophageal reflux disease)      Gout      Hematuria      HTN (hypertension)     LDL=59 (3/29/2014)     Hyperlipaemia      Ischemic cardiomyopathy      Mitral regurgitation     s/p MVR with Carbomedics ring      Myocardial infarction 1998    In [facility] without intervention      Nonsenile cataract     BE     PFO (patent foramen  foreign)     s/p closure (10/9/2012)     TB lung, latent     s/p 9 months INH in 2012          Prior to Admission Medications   Prior to Admission Medications   Prescriptions Last Dose Informant Patient Reported? Taking?   ALEK CONTOUR test strip   No No   Sig: USE TO TEST BLOOD SUGAR 2 TIMES DAILY OR AS DIRECTED.   Melatonin 10 MG TABS tablet 6/5/2018 at PM  No No   Sig: Take 1 tablet (10 mg) by mouth At Bedtime   Metoprolol Succinate (TOPROL XL PO) 6/6/2018 at AM Daughter Yes Yes   Sig: Take 50 mg by mouth daily    ORDER FOR DME   No No   Sig: Equipment being ordered: Lift chair.    Please see indications and face-to-face encounter details in 2/3/15 Office Visit note.   acetaminophen (TYLENOL) 325 MG tablet 6/6/2018 at 1300  Yes Yes   Sig: Take 2 tablets (650 mg) by mouth every 6 hours as needed for mild pain   aspirin 81 MG tablet 6/6/2018 at Unknown time  No Yes   Sig: Take 1 tablet (81 mg) by mouth daily   bisacodyl (DULCOLAX) 10 MG Suppository Past Week at Unknown time Daughter No Yes   Sig: Place 1 suppository (10 mg) rectally daily as needed for constipation   blood glucose monitoring (ALEK MICROLET) lancets   No No   Sig: USE TO TEST BLOOD SUGAR 2 TIMES DAILY OR AS DIRECTED   blood glucose monitoring (NO BRAND SPECIFIED) lancets   No No   Sig: Use to test blood sugars 2 times daily or as directed.   finasteride (PROSCAR) 5 MG tablet 6/6/2018 at AM Daughter No Yes   Sig: Take 1 tablet (5 mg) by mouth daily   furosemide (LASIX) 20 MG tablet 6/6/2018 at Unknown time Daughter No Yes   Sig: Take 1 tablet (20 mg) by mouth as needed   levETIRAcetam (KEPPRA) 100 MG/ML solution 6/6/2018 at AM  No Yes   Sig: Take 7.5 mLs (750 mg) by mouth every 12 hours   loratadine (CLARITIN) 10 MG tablet 6/6/2018 at Unknown time  No Yes   Sig: TAKE 1 TABLET (10 MG) BY MOUTH DAILY   losartan (COZAAR) 25 MG tablet 6/6/2018 at Unknown time  Yes Yes   Sig: Take 1 tablet (25 mg) by mouth daily   nitroGLYcerin (NITROSTAT) 0.4 MG  sublingual tablet   No Yes   Sig: Place 1 tablet (0.4 mg) under the tongue every 5 minutes as needed for chest pain   nystatin-triamcinolone (MYCOLOG II) cream 6/5/2018  No No   Sig: Apply 1 g topically 2 times daily for 10 days   order for DME   No No   Sig: Equipment being ordered: Bilateral leg supports for manual wheelchair.   order for DME   No No   Sig: Equipment being ordered: Reclining manual w/c with bilateral elevating leg rests.   order for DME   No No   Sig: Equipment being ordered: Hospital Bed, fully electric.   order for DME   No No   Sig: Equipment being ordered: Wheelchair, manual.   order for DME   No No   Sig: Equipment being ordered: Wheelchair, manual, with elevated leg rests and tilt in space back.  Please fax to [employer]; I called them 11/26/16 and they requested the new order.  Face to face is in my 10/26/16 note.   order for DME   No No   Sig: Equipment being ordered: Cushioned heel boots.   order for DME   No No   Sig: Bilateral heel protective cushioning boots.  Dx: previous stroke with left hemiplegia.  Duration of need: 99 months.   order for DME   No No   Sig: Equipment being ordered: Nebulizer   pantoprazole (PROTONIX) 20 MG EC tablet 6/6/2018 at AM  No Yes   Sig: Take 1 tablet (20 mg) by mouth every morning (before breakfast)   senna-docusate (SENOKOT-S;PERICOLACE) 8.6-50 MG per tablet Past Week at Unknown time  Yes Yes   Sig: Take 1-2 tablets by mouth 2 times daily as needed for constipation   simethicone (MYLICON) 80 MG chewable tablet Past Month at Unknown time  Yes Yes   Sig: Take 80 mg by mouth every 6 hours as needed for flatulence or cramping   tamsulosin (FLOMAX) 0.4 MG capsule 6/6/2018 at Unknown time  No Yes   Sig: Take 2 capsules (0.8 mg) by mouth daily   thiamine (VITAMIN B-1) 100 MG tablet 6/6/2018 at Unknown time  No Yes   Sig: Take 1 tablet (100 mg) by mouth daily   warfarin (COUMADIN) 1 MG tablet 6/6/2018 at 0700  No Yes   Sig: Take 3 tablets (3 mg) by mouth daily Or as  "directed after INR dosing changes      Facility-Administered Medications: None       Medications     IV fluid REPLACEMENT ONLY       - MEDICATION INSTRUCTIONS -       - MEDICATION INSTRUCTIONS -       Reason ACE/ARB/ARNI order not selected       Reason beta blocker order not selected       Warfarin Therapy Reminder         aspirin  81 mg Oral or Feeding Tube Daily     cefTRIAXone  1 g Intravenous Q12H     doxazosin  2 mg Oral or Feeding Tube Daily     finasteride  5 mg Oral or Feeding Tube Daily     insulin aspart  1-4 Units Subcutaneous Q4H     levETIRAcetam  750 mg Oral or Feeding Tube BID     melatonin  10 mg Oral or Feeding Tube At Bedtime     multivitamins with minerals  15 mL Per Feeding Tube Daily     protein modular  1 packet Per Feeding Tube TID     thiamine  100 mg Oral or Feeding Tube Daily     warfarin  2 mg Oral ONCE at 18:00         Physical Exam   Objective:  Temp:  [97.7  F (36.5  C)-99.6  F (37.6  C)] 97.7  F (36.5  C)  Heart Rate:  [] 97  Resp:  [18] 18  BP: ()/(70-90) 116/79  SpO2:  [100 %] 100 %  No Data Recorded    General:  Lying in bed, squeezing eyes closed, moving the right side spontaneously  HEENT:  normocephalic/atraumatic, Mild epistaxis  Cardio:  RRR  Pulmonary:  no respiratory distress  Abdomen:  soft  Extremities:  no edema  Skin:  warm/dry      Neurologic  Mental Status:  Lying in bed,opens eyes to voice, very little speech output, only intermittently saying \"get off me\", not able to say his name or follow commands.  Cranial Nerves:  PERRL, EOMI with normal smooth pursuit, facial movements symmetric, no dysarthria, tongue protrusion midline  Motor:  no abnormal movements, increaed tone in LUE, LUE/LLE plegic but withdraw minimally to pain. RLE 2/5, RUE 5/5. No abnormal movements.  Reflexes:  2+ and symmetric throughout, no clonus, left toe up  Sensory:  withdraws in all four  Coordination:  cannot assess due to mental status  Station/Gait:  unable to test    Data "       Labs:  CBC    Recent Labs  Lab 06/10/18  0521 06/08/18  1923 06/06/18 2115   WBC 12.1* 10.2 8.4   HGB 8.4* 8.5* 8.5*    188 219       COAGS    Recent Labs  Lab 06/10/18  0521 06/09/18  0527 06/08/18  0352 06/06/18 2115   INR 1.81* 2.33* 2.21* 1.94*   PTT  --   --   --  51*       INR:  Recent Labs   Lab Test  06/10/18   0521  06/09/18   0527  06/08/18   0352  06/06/18   2115 06/06/18   2114   INR  1.81*  2.33*  2.21*  1.94*  2.0*        BMP    Recent Labs  Lab 06/10/18  0521 06/09/18  0527 06/08/18  0352 06/06/18 2115   * 141 141 136   POTASSIUM 5.1 4.1 4.4 4.0   CHLORIDE 116* 114* 112* 106   CO2 17* 20 17* 21   BUN 43* 36* 28 26   CR 1.84* 2.01* 1.97* 2.14*   JOSÉ 8.1* 8.1* 8.2* 7.9*       Liver panel:  Recent Labs   Lab Test  06/06/18 2115 06/02/18   1214  05/31/18   0432  05/10/18   1045  05/04/18   1322   PROTTOTAL  7.5  7.0  7.2  7.4  7.4   ALBUMIN  3.2*  3.0*  3.1*  3.1*  3.2*   BILITOTAL  0.7  0.6  0.6  0.5  0.6   ALKPHOS  117  102  105  95  93   AST  50*  64*  Unsatisfactory specimen - hemolyzed  43  59*   ALT  19  20  23  16  16       ABGNo results for input(s): PH, PCO2, PO2, HCO3 in the last 168 hours.    CRP/ESRNo results for input(s): CRP in the last 168 hours.    Invalid input(s): ESR    CSFNo results for input(s): CGLU, CTP in the last 168 hours.    Invalid input(s): CCSF    MICRONo results for input(s): CULT in the last 168 hours.    LIPID Profile:   Cholesterol   Date Value Ref Range Status   06/08/2018 152 <200 mg/dL Final   05/10/2018 151 <200 mg/dL Final     HDL Cholesterol   Date Value Ref Range Status   06/08/2018 38 (L) >39 mg/dL Final   05/10/2018 35 (L) >39 mg/dL Final     LDL Cholesterol Calculated   Date Value Ref Range Status   06/08/2018 92 <100 mg/dL Final     Comment:     Desirable:       <100 mg/dl   05/10/2018 90 <100 mg/dL Final     Comment:     Desirable:       <100 mg/dl     Triglycerides   Date Value Ref Range Status   06/08/2018 113 <150 mg/dL Final    05/10/2018 131 <150 mg/dL Final     No results found for: CHOLHDLRATIO    A1C:   Recent Labs   Lab Test  06/07/18   0555  08/04/17   0830  07/21/16   1119  09/03/14   0648   A1C  4.7  4.8  5.2  8.0*       Troponin I:   Recent Labs   Lab Test  06/06/18   2115  05/10/18   1045  05/02/18   0049  03/07/18   1555  11/22/17   1330  05/03/17   0350  05/02/17   2247  02/11/17   0651   TROPI  <0.015  <0.015  <0.015  <0.015  <0.015  <0.015  The 99th percentile for upper reference range is 0.045 ug/L.  Troponin values in   the range of 0.045 - 0.120 ug/L may be associated with risks of adverse   clinical events.    <0.015  The 99th percentile for upper reference range is 0.045 ug/L.  Troponin values in   the range of 0.045 - 0.120 ug/L may be associated with risks of adverse   clinical events.    <0.015  The 99th percentile for upper reference range is 0.045 ug/L.  Troponin values in   the range of 0.045 - 0.120 ug/L may be associated with risks of adverse   clinical events.         Imaging  No results found for this or any previous visit (from the past 24 hour(s)).      Oscar Mathis MD  10:31 AM Jenifer 10, 2018  Vascular Neurology Fellow.

## 2018-06-10 NOTE — PLAN OF CARE
Problem: Patient Care Overview  Goal: Plan of Care/Patient Progress Review  Outcome: No Change  Neuro - Pupils equal, reactive, pt intermittently follow commands (per family), pt moves right upper extremity(pulls at NJ, patrick in place). Pt talking to family more today, still confused per family. Afebrile.   Cardiac - HM2 numbers unchanged (see flow sheet). MAPs 70-90's. Phos being replaced.  Respiratory - RA, diminished lung sounds, coughs up thick tan secretions.  GI - TF at goal through NJ at 50ml/hr, Pt had BM yesterday, active bowel sounds, passing gas. MD notified  this AM, MD will look into.   - Condom catheter in place, good UOP  Family - Present at bedside, involved in care.  Plan - Possible PEG/family conference next week. Transfer upstairs when bed available. Continue to monitor and notify MD of questions or concerns.

## 2018-06-11 NOTE — PROGRESS NOTES
[facility], [place]  Vascular Neurology Progress Note  06/11/2018    Patient Name: Sohan Rendon  Admission Date: 6/6/2018  Date of Service: 06/11/2018  Current hospital day: Hospital Day: 6  Reason for Consult : LT MCA stroke  Chief Complaint : Aphasia  Primary Care Physician  :   Shilpi Agosto  Code Status   Full Code    24 hours events:    6/10 - Leukocytosis trending up. UA positive. Floor status. Linezolid started for probable VRE. Started on Insulin drip in the evening.    6/11- glucose improving but still elevated. Started on insulin drip yesterday. Started Modafinil.    Problem List:  LVAD thrombus on Anticoagulation   Aphasia 2/2 New LT MCA Stroke Distribution     Assessment:  Sohan Rendon is a 68 year old male with PMH of HTN, CHF on LVAD with known thrombus on Anticoagulation warfarin, RT MCA stroke, who presents with New onset aphasia  Found to have LT parietal ischemic infarct, etiology likely LT ICA thrombus most likley 2/2 LVAD Thrombus. TPA was not given for High INR and plan for no intervention because of MRS 4 at baseline.     Pertinent Studies  LDL 92  A1C 4.7  CT Head 6/7 shows LT Parietal Stroke    Recommendation:  # Global aphasia  # L petrous ICA occlusion, unclear chronicity, not seen on CTA 2014  # Encephalopathy  - Neurochecks Q 4 hr  - Permissive HTN; labetalol PRN for MAPs >80  - Euthermia, Euglycemia  - Coumadin( switch to heparin) + ASA  - Statin  - Discussed with GI regarding PEG tube. They will reevaluate if PEG is possible at all in his case. Additionally, if patient passes speech then he might not need PEG.   - Family conference this week reg goals of care.     # Started him on Modafinil 100 mg(6/11)    # LVAD with Known Thrombosis- not a candidate for pump exchange or further therapies per cardiology, INR 2.0 on admission  # CHF   - Lasix PRN held   - will switch coumadin to heparin in anticipation of procedure(PEG)  - Might need to switch to  heparin drip for possible PEG tube placement.     # H/o seizures  - Keppra 750 mg BID  - EEG today    # Hyperglycemia  Insulin drip. Glucose levels improved.    # UTI  Cefepime ( Day 2 of Abx).    # HTN  - holding home metoprolol, losartan      # BPH  - Tamsulosin, finasteride      Other home meds:  - thiamine        NPO  Insulin drip.  IV  cc/hr  Prophylaxis          For VTE Prevention: therapeutic on warfarin --> switch to heparin today  Code: Full  Dispo: Floor. Will likely need rehab.     Activity: As tolerated  Diet: Per Speech therapy recs  DVT ppx: SCDs   Disposition: Per PT/OT Recs        Prior to Admission Medications   Prior to Admission Medications   Prescriptions Last Dose Informant Patient Reported? Taking?   ALEK CONTOUR test strip   No No   Sig: USE TO TEST BLOOD SUGAR 2 TIMES DAILY OR AS DIRECTED.   Melatonin 10 MG TABS tablet 6/5/2018 at PM  No No   Sig: Take 1 tablet (10 mg) by mouth At Bedtime   Metoprolol Succinate (TOPROL XL PO) 6/6/2018 at AM Daughter Yes Yes   Sig: Take 50 mg by mouth daily    ORDER FOR DME   No No   Sig: Equipment being ordered: Lift chair.    Please see indications and face-to-face encounter details in 2/3/15 Office Visit note.   acetaminophen (TYLENOL) 325 MG tablet 6/6/2018 at 1300  Yes Yes   Sig: Take 2 tablets (650 mg) by mouth every 6 hours as needed for mild pain   aspirin 81 MG tablet 6/6/2018 at Unknown time  No Yes   Sig: Take 1 tablet (81 mg) by mouth daily   bisacodyl (DULCOLAX) 10 MG Suppository Past Week at Unknown time Daughter No Yes   Sig: Place 1 suppository (10 mg) rectally daily as needed for constipation   blood glucose monitoring (ALEK MICROLET) lancets   No No   Sig: USE TO TEST BLOOD SUGAR 2 TIMES DAILY OR AS DIRECTED   blood glucose monitoring (NO BRAND SPECIFIED) lancets   No No   Sig: Use to test blood sugars 2 times daily or as directed.   finasteride (PROSCAR) 5 MG tablet 6/6/2018 at AM Daughter No Yes   Sig: Take 1 tablet (5 mg) by mouth  daily   furosemide (LASIX) 20 MG tablet 6/6/2018 at Unknown time Daughter No Yes   Sig: Take 1 tablet (20 mg) by mouth as needed   levETIRAcetam (KEPPRA) 100 MG/ML solution 6/6/2018 at AM  No Yes   Sig: Take 7.5 mLs (750 mg) by mouth every 12 hours   loratadine (CLARITIN) 10 MG tablet 6/6/2018 at Unknown time  No Yes   Sig: TAKE 1 TABLET (10 MG) BY MOUTH DAILY   losartan (COZAAR) 25 MG tablet 6/6/2018 at Unknown time  Yes Yes   Sig: Take 1 tablet (25 mg) by mouth daily   nitroGLYcerin (NITROSTAT) 0.4 MG sublingual tablet   No Yes   Sig: Place 1 tablet (0.4 mg) under the tongue every 5 minutes as needed for chest pain   nystatin-triamcinolone (MYCOLOG II) cream 6/5/2018  No No   Sig: Apply 1 g topically 2 times daily for 10 days   order for DME   No No   Sig: Equipment being ordered: Bilateral leg supports for manual wheelchair.   order for DME   No No   Sig: Equipment being ordered: Reclining manual w/c with bilateral elevating leg rests.   order for DME   No No   Sig: Equipment being ordered: Hospital Bed, fully electric.   order for DME   No No   Sig: Equipment being ordered: Wheelchair, manual.   order for DME   No No   Sig: Equipment being ordered: Wheelchair, manual, with elevated leg rests and tilt in space back.  Please fax to [employer]; I called them 11/26/16 and they requested the new order.  Face to face is in my 10/26/16 note.   order for DME   No No   Sig: Equipment being ordered: Cushioned heel boots.   order for DME   No No   Sig: Bilateral heel protective cushioning boots.  Dx: previous stroke with left hemiplegia.  Duration of need: 99 months.   order for DME   No No   Sig: Equipment being ordered: Nebulizer   pantoprazole (PROTONIX) 20 MG EC tablet 6/6/2018 at AM  No Yes   Sig: Take 1 tablet (20 mg) by mouth every morning (before breakfast)   senna-docusate (SENOKOT-S;PERICOLACE) 8.6-50 MG per tablet Past Week at Unknown time  Yes Yes   Sig: Take 1-2 tablets by mouth 2 times daily as needed for  constipation   simethicone (MYLICON) 80 MG chewable tablet Past Month at Unknown time  Yes Yes   Sig: Take 80 mg by mouth every 6 hours as needed for flatulence or cramping   tamsulosin (FLOMAX) 0.4 MG capsule 6/6/2018 at Unknown time  No Yes   Sig: Take 2 capsules (0.8 mg) by mouth daily   thiamine (VITAMIN B-1) 100 MG tablet 6/6/2018 at Unknown time  No Yes   Sig: Take 1 tablet (100 mg) by mouth daily   warfarin (COUMADIN) 1 MG tablet 6/6/2018 at 0700  No Yes   Sig: Take 3 tablets (3 mg) by mouth daily Or as directed after INR dosing changes      Facility-Administered Medications: None       Medications     IV fluid REPLACEMENT ONLY       IV fluid REPLACEMENT ONLY       insulin (regular) 10 Units/hr (06/11/18 1114)     - MEDICATION INSTRUCTIONS -       - MEDICATION INSTRUCTIONS -       Reason ACE/ARB/ARNI order not selected       Reason beta blocker order not selected       Warfarin Therapy Reminder         aspirin  81 mg Oral or Feeding Tube Daily     ceFEPIme (MAXIPIME) IV  2 g Intravenous Q24H     doxazosin  2 mg Oral or Feeding Tube Daily     finasteride  5 mg Oral or Feeding Tube Daily     levETIRAcetam  750 mg Oral or Feeding Tube BID     loratadine  10 mg Oral or Feeding Tube Daily     melatonin  10 mg Oral or Feeding Tube At Bedtime     modafinil  100 mg Oral Daily     multivitamins with minerals  15 mL Per Feeding Tube Daily     pantoprazole  20 mg Oral or Feeding Tube QAM AC     potassium & sodium phosphates  1 packet Oral or Feeding Tube 4x Daily     protein modular  1 packet Per Feeding Tube TID     thiamine  100 mg Oral or Feeding Tube Daily         Physical Exam   Objective:  Temp: 98.6  F (37  C) Temp  Min: 97.8  F (36.6  C)  Max: 98.9  F (37.2  C)  Resp: 16 Resp  Min: 16  Max: 18  SpO2: 100 % SpO2  Min: 97 %  Max: 100 %  Heart Rate: 109 Heart Rate  Min: 84  Max: 115       Intake/Output Summary (Last 24 hours) at 06/11/18 0815  Last data filed at 06/11/18 0600   Gross per 24 hour   Intake              1610 ml   Output             1600 ml   Net               10 ml       General:  Lying in bed, squeezing eyes closed, moving the right side spontaneously  HEENT:  normocephalic/atraumatic, Mild epistaxis  Cardio:  RRR  Pulmonary:  no respiratory distress  Abdomen:  soft  Extremities:  no edema  Skin:  warm/dry      Neurologic  Mental Status:  Lying in bed,opens eyes to voice, minimal speech output  Cranial Nerves:  PERRL, EOMI with normal smooth pursuit, facial movements symmetric, no dysarthria, tongue protrusion midline  Motor:  no abnormal movements, increaed tone in LUE, LUE/LLE plegic but withdraw minimally to pain. RLE 2/5, RUE 5/5. No abnormal movements.  Reflexes:  2+ and symmetric throughout, no clonus, left toe up  Sensory:  responds in all four extremities  Coordination:  No Ataxia in UE.  Station/Gait:  deferred    Data       Labs:  CBC    Recent Labs  Lab 06/10/18  0521 06/08/18  1923 06/06/18 2115   WBC 12.1* 10.2 8.4   HGB 8.4* 8.5* 8.5*    188 219       COAGS    Recent Labs  Lab 06/11/18  0600 06/10/18  0521 06/09/18  0527 06/08/18  0352 06/06/18 2115   INR 1.71* 1.81* 2.33* 2.21* 1.94*   PTT  --   --   --   --  51*       INR:  Recent Labs   Lab Test  06/11/18   0600  06/10/18   0521  06/09/18   0527  06/08/18   0352  06/06/18   2115   INR  1.71*  1.81*  2.33*  2.21*  1.94*        BMP    Recent Labs  Lab 06/10/18  0521 06/09/18  0527 06/08/18  0352 06/06/18  2115   * 141 141 136   POTASSIUM 5.1 4.1 4.4 4.0   CHLORIDE 116* 114* 112* 106   CO2 17* 20 17* 21   BUN 43* 36* 28 26   CR 1.84* 2.01* 1.97* 2.14*   JOSÉ 8.1* 8.1* 8.2* 7.9*       Liver panel:  Recent Labs   Lab Test  06/06/18   2115  06/02/18   1214  05/31/18   0432  05/10/18   1045  05/04/18   1322   PROTTOTAL  7.5  7.0  7.2  7.4  7.4   ALBUMIN  3.2*  3.0*  3.1*  3.1*  3.2*   BILITOTAL  0.7  0.6  0.6  0.5  0.6   ALKPHOS  117  102  105  95  93   AST  50*  64*  Unsatisfactory specimen - hemolyzed  43  59*   ALT  19  20  23  16  16          Recent Labs  Lab 06/09/18  2043   CULT >100,000 colonies/mLEnterobacter cloacae complex*       LIPID Profile:   Cholesterol   Date Value Ref Range Status   06/08/2018 152 <200 mg/dL Final   05/10/2018 151 <200 mg/dL Final     HDL Cholesterol   Date Value Ref Range Status   06/08/2018 38 (L) >39 mg/dL Final   05/10/2018 35 (L) >39 mg/dL Final     LDL Cholesterol Calculated   Date Value Ref Range Status   06/08/2018 92 <100 mg/dL Final     Comment:     Desirable:       <100 mg/dl   05/10/2018 90 <100 mg/dL Final     Comment:     Desirable:       <100 mg/dl     Triglycerides   Date Value Ref Range Status   06/08/2018 113 <150 mg/dL Final   05/10/2018 131 <150 mg/dL Final     No results found for: CHOLHDLRATIO    A1C:   Recent Labs   Lab Test  06/07/18   0555  08/04/17   0830  07/21/16   1119  09/03/14   0648   A1C  4.7  4.8  5.2  8.0*       Troponin I:   Recent Labs   Lab Test  06/06/18   2115  05/10/18   1045  05/02/18   0049  03/07/18   1555  11/22/17   1330  05/03/17   0350  05/02/17   2247  02/11/17   0651   TROPI  <0.015  <0.015  <0.015  <0.015  <0.015  <0.015  The 99th percentile for upper reference range is 0.045 ug/L.  Troponin values in   the range of 0.045 - 0.120 ug/L may be associated with risks of adverse   clinical events.    <0.015  The 99th percentile for upper reference range is 0.045 ug/L.  Troponin values in   the range of 0.045 - 0.120 ug/L may be associated with risks of adverse   clinical events.    <0.015  The 99th percentile for upper reference range is 0.045 ug/L.  Troponin values in   the range of 0.045 - 0.120 ug/L may be associated with risks of adverse   clinical events.           Oscar Mathis MD  8:11 AM June 11, 2018  Vascular Neurology Fellow.

## 2018-06-11 NOTE — PLAN OF CARE
Problem: Stroke (Ischemic) (Adult)  Goal: Signs and Symptoms of Listed Potential Problems Will be Absent, Minimized or Managed (Stroke)  Signs and symptoms of listed potential problems will be absent, minimized or managed by discharge/transition of care (reference Stroke (Ischemic) (Adult) CPG).   Outcome: No Change   06/10/18 1855   Stroke (Ischemic)   Problems Present (Stroke Ischemic/TIA) cognitive impairment;acute neurologic deterioration;eating/swallowing impairment;motor/sensory impairment;situational response     D/I/A:  Aphasia/AMS 2/2 new L-MCA stroke. Variable neuro response. In AM opened eyes spontaneously, able to follow some commands, more communicative to family. In PM, lethargic, not following commands. PERRL. SR/ST+1AVB (90's - 110's) w/ occs. PVCs, PACs. SaO2 >95% RA. TF at goal of 50 cc/hr. Phos recheck = 2.3, replacing per protocol. Also added phos replacement via NG. LVAD WNL at 8000 rpm, flow ---/+++/4's, PI and power in the 5's. No VAD alarms during shift. BG very high (>400's) at start of shift. Spoke to Neuro Crit, high-SSI protocol ordered. 12:00 , 12 U given, Neuro Crit aware. BG at 16:00 ~390, 11 U Novolog given. Spoke to Neuro Crit resident regarding adding basal insulin and/or switching to drip. Per neurocrit resident insulin gtt will be ordered. Additional IV access and syringe pump ordered in preparation.      Plan:  Monitor mental status, lytes, and BG. Notify provider of changes in prep for transfer to .

## 2018-06-11 NOTE — CONSULTS
6/11/2018 Gastroenterology Brief Note    GI Consult for PEG tube for nutition acknowledged. Chart reviewed. Patient with h/o heart failure, current with LVAD - admitted with CVA, team requesting PEG tube for nutrition due to inability to take po.     Technically speaking, it is possible to attempt placement of PEG tube endoscopically (ideally off anticoagulation). We would require that patient be off AC for at LEAST 48 hours post procedure because of high risk of bleeding. Primary team still deciding if this is the best option for patient. Therefore, patient not seen at this time and will await more final decision that aligns with patient/family's GOC.Would also recommend repeat evaluation from SLP for swallow evaluation.    Please call with questions, we will follow peripherally      Above in collaboration with Dr. Kvng Fleming PA-C  [facility]  Pager *[phone]

## 2018-06-11 NOTE — PROGRESS NOTES
Cardiology Progress Note    Assessment & Plan   This is a 69 yo M with a history of ICM s/p HM II in 2012 as destination therapy with a course complicated by hemolysis, pump thrombosis and multiple strokes (subcortical, R PICA, R MCA), PFO s/p closure in 2012,MVr with carbomedics ring ICD placement in 2011, latent TB, PFO s/p closure in 2012, CKD presenting with new altered mental status and aphasia CT showing large R MCA encephalomalacia and CTA showing occluded petrous L ICA of unclear chronicity, CT scan 6/7 shows new large MCA stroke. INR on admission was 2     - heart failure 2/2 ICM s/p HM II as DT in 2012  - Stage D NYHA class III  - complicated by hemolysis, suspected pump thrombosis with persistently elevated LDH and strokes admitted with new L ICA stroke.  -PTA cardiac meds are: metoprolol XL 50mg OD, losartan 25mg OD, asa, lasix 20mg PRN- will allow permissive hypertension per neuro protocol, when possible would resume PTA medications to obtain MAP's <80   - no indication for troponin check or repeat echo since he has known pump thrombosis and is not a candidate for pump exchange or further therapies  - MAP in the 90's SCr at baseline- consider restarting losartan at 12.5mg OD (1/2 of home dose)  - Anticoagulated with coumadin pta (INR goal 1.8-2.3) INR on admission 2,     - new LMCA stroke  - per neuro permissive HTN with MAPS>80  - history of seizures- continue keppra- check level    - BPH -On tamsulosin and finasteride    - UTI- enterobacter- abx per primary team - on cefepime currently    Needs to reassess goals of care- PEG tube would not be ideal for his quality of life and we do not think he would benefit from this in the long run given his baseline quality of life, happy to participate in a care conference to discuss this further with the family.     Will discuss with Dr. Traci Ayala  Cardiology fellow, PGY-4    Interval History   Was febrile HR 's sating well on  room air  UCx positive for enterobacter on abx  INR 1.7       Physical Exam   Temp: 98.6  F (37  C) Temp src: Axillary BP: (!) 106/92   Heart Rate: 109 Resp: 16 SpO2: 100 % O2 Device: None (Room air)    Vitals:    06/07/18 0400 06/08/18 0400 06/09/18 0550   Weight: 88.7 kg (195 lb 8.8 oz) 66.6 kg (146 lb 13.2 oz) 86 kg (189 lb 9.5 oz)     Vital Signs with Ranges  Temp:  [97.8  F (36.6  C)-98.9  F (37.2  C)] 98.6  F (37  C)  Heart Rate:  [] 109  Resp:  [16-18] 16  BP: ()/(79-92) 106/92  SpO2:  [99 %-100 %] 100 %  I/O last 3 completed shifts:  In: 1450 [I.V.:400; NG/GT:250]  Out: 1000 [Urine:1000]    Heart Rate: 109, Blood pressure (!) 106/92, temperature 98.6  F (37  C), temperature source Axillary, resp. rate 16, height 1.829 m (6'), weight 86 kg (189 lb 9.5 oz), SpO2 100 %.  189 lbs 9.53 oz  GEN: obtunded,  NAD.  CV:  LVAD humm   LUNGS:  Clear to auscultation bilaterally   ABD:  Active bowel sounds, soft, non-tender/non-distended.  No rebound/guarding/rigidity.  EXT:  No edema or cyanosis.      Medications     IV fluid REPLACEMENT ONLY       IV fluid REPLACEMENT ONLY       HEParin 1,050 Units/hr (06/11/18 1422)     insulin (regular) 8 Units/hr (06/11/18 1506)     - MEDICATION INSTRUCTIONS -       - MEDICATION INSTRUCTIONS -       Reason ACE/ARB/ARNI order not selected       Reason beta blocker order not selected       Warfarin Therapy Reminder         aspirin  81 mg Oral or Feeding Tube Daily     ceFEPIme (MAXIPIME) IV  2 g Intravenous Q24H     doxazosin  2 mg Oral or Feeding Tube Daily     finasteride  5 mg Oral or Feeding Tube Daily     levETIRAcetam  750 mg Oral or Feeding Tube BID     loratadine  10 mg Oral or Feeding Tube Daily     melatonin  10 mg Oral or Feeding Tube At Bedtime     modafinil  100 mg Oral Daily     multivitamins with minerals  15 mL Per Feeding Tube Daily     pantoprazole  20 mg Oral or Feeding Tube QAM AC     potassium & sodium phosphates  1 packet Oral or Feeding Tube 4x Daily      protein modular  1 packet Per Feeding Tube TID     thiamine  100 mg Oral or Feeding Tube Daily     warfarin  2 mg Oral ONCE at 18:00       Data     Recent Labs  Lab 06/11/18  1247 06/11/18  0600 06/10/18  0521 06/09/18  0527 06/08/18  1923 06/08/18  0352 06/06/18  2116 06/06/18  2115   WBC 10.8  --  12.1*  --  10.2  --   --  8.4   HGB 8.2*  --  8.4*  --  8.5*  --   --  8.5*   MCV 97  --  96  --  95  --   --  95     --  199  --  188  --   --  219   INR  --  1.71* 1.81* 2.33*  --  2.21*  --  1.94*   NA  --   --  145* 141  --  141  --  136   POTASSIUM  --   --  5.1 4.1  --  4.4  --  4.0   CHLORIDE  --   --  116* 114*  --  112*  --  106   CO2  --   --  17* 20  --  17*  --  21   BUN  --   --  43* 36*  --  28  --  26   CR  --   --  1.84* 2.01*  --  1.97*  --  2.14*   ANIONGAP  --   --  11 8  --  13  --  8   JOSÉ  --   --  8.1* 8.1*  --  8.2*  --  7.9*   GLC  --   --  410* 234*  --  147*  --  153*   ALBUMIN  --   --   --   --   --   --   --  3.2*   PROTTOTAL  --   --   --   --   --   --   --  7.5   BILITOTAL  --   --   --   --   --   --   --  0.7   ALKPHOS  --   --   --   --   --   --   --  117   ALT  --   --   --   --   --   --   --  19   AST  --   --   --   --   --   --   --  50*   TROPI  --   --   --   --   --   --   --  <0.015   TROPONIN  --   --   --   --   --   --  0.01  --        No results found for this or any previous visit (from the past 24 hour(s)).

## 2018-06-11 NOTE — PROGRESS NOTES
Preliminary Video EEG impression on June 11, 2018  for the first 20 minutes.  Full report to follow. Please look in inpatient chart, under procedure section.       This EEG is abnormal due to the presences of continuous generalized polymorphic theta slowing with maximum slowing in the left frontotemporal region. There is a  5-6 hz symmetric parieto-occipital rhythm . This EEG is consistent with a moderate encephalopathy with maximum cortical dysfunction on the left frontotemporal region. No epileptiform discharges or electrographic seizures were seen in this record. If events of interest are noted, please press the event button and complete behavioral testing (ROAR testing) if possible. Clinical correlation is advised.       Ilda Flores MD  Staff Epilepsy Neurologist   [phone]

## 2018-06-11 NOTE — PLAN OF CARE
"Problem: Patient Care Overview  Goal: Plan of Care/Patient Progress Review  Patient transferred to  from ICU about 2130, accompanied by family members. HM 2 LVAD numbers shows Flow ---, briefly 4.3, other numbers WNL. Patient with strong productive cough, opens eyes sometimes, speaks sometimes but illogical speech per family. Patient does not generally follow commands. Right hand  strong, left arm with no movement. NG with tube feeding at 50 ml/ hr. Family said they were concerned the tube feed formula had \"too much sugar\" and wanted a different formula. Neuro doctor paged and spoke with this author by phone, said he would pass along concern to team for AM rounds to consider. This was conveyed to family and they seemed to accept this plan. Patient started on insulin drip for high blood sugars, see results page for details. Last glucose  At 0200 was 329, moved to algorithm 2 and present rate 10 units/ hr. Family cleaning patient when he has stool incontinence and turning patient, family said they thought his condom cath was making him \"itchy\" and requested change. Condom cath removed and skin cleaned and new condom cath applied. Tylenol and scheduled melatonin crushed and given via feeding tube. Patient appears calm and sleeping at this time. Continue cares, monitor glucose and insulin drip closely, keep family informed of plans and results. Plan care conference later today.       "

## 2018-06-11 NOTE — PLAN OF CARE
Problem: Patient Care Overview  Goal: Plan of Care/Patient Progress Review  OT6C: Per discussion with interdisciplinary team and chart review OT to hold at this time pending results of care conference. Will schedule on for tomorrow.

## 2018-06-11 NOTE — PROGRESS NOTES
"Pt has been sleeping most of day, lethargic when awake, spoke to daughter briefly this afternoon, no improvement after modafinil dose. Continues to only have right arm movement. Family as been at bedside all day. LVAD numbers WNL, with flow showing --- most of the time, briefly 4.1. Continues with TF at 50mL/hr, appears to be tolerating well. Discussed with dietician family's concern of TF formula having \"too much sugar\", dietician evaluated and considered formula appropriate, information related to family. Productive cough observed, family suctioning sputum. Speech attempted to evaluate patient today but was unable to, will reschedule for tomorrow. Continues to also be on insulin gtt, still on algorithm 4 but BG are now in 100s. Placed on EEG this afternoon to evaluate for seizures. Heparin gtt started at 1050U/hr for potential PEG tube placement later this week, 10a due at 2030. Condom catheter fell off this morning, family requested to keep off, offering urinal often, had one void in urinal. BM this afternoon. Pt appears to be comfortable. Will continue to monitor and notify team of any questions or concerns.   "

## 2018-06-11 NOTE — PROGRESS NOTES
During my attempted visit pt/family not available.     Will continue to provide support to pt/family during their hospitalization at least 1x/wk.

## 2018-06-11 NOTE — PLAN OF CARE
Problem: Patient Care Overview  Goal: Plan of Care/Patient Progress Review  Therapy canceled- Pt was having an EEG during the scheduled sptx time with the  and  was not able to stay beyond when it was completed. Will reattempt tomorrow.

## 2018-06-11 NOTE — MR AVS SNAPSHOT
After Visit Summary   6/11/2018    Sohan Rendon    MRN: [id number]           Patient Information     Date Of Birth          1950        Visit Information        Provider Department      6/11/2018 1:30 PM [facility] EEG TECH 4 [facility]        Today's Diagnoses     Encephalopathy    -  1       Follow-ups after your visit        Your next 10 appointments already scheduled     Jun 12, 2018  7:00 AM CDT   24 Hour Video Visit with [facility] EEG TECH 4   [facility] ([facility])    [facility]  [address] [address]   [phone]           Garrett: Your appointment is scheduled at [facility]. [address] [place] [address]            Jun 13, 2018  9:45 AM CDT   Ventricular Assist Device with Evon Lemons MD   [facility] ([facility])    [address] [address] [phone]            Jun 22, 2018 10:00 AM CDT   Home Follow Up with Raghu Almodovar MD   [facility] ([facility])    [address] [id number] [phone]              Who to contact     Please call your clinic at [phone] to:    Ask questions about your health    Make or cancel appointments    Discuss your medicines    Learn about your test results    Speak to your doctor            Additional Information About Your Visit        MyChart Information     Clickslidet is an electronic gateway that provides easy, online access to your medical records. With LetsCram, you can request a clinic appointment, read your test results, renew a prescription or communicate with your care team.     To sign up for Clickslidet visit the website at [email or web address]   You will be asked to enter the access code listed below, as well as some personal information. Please follow the directions to create your username and password.     Your  access code is: [email or web address]  Expires: 2018  3:38 PM     Your access code will  in 90 days. If you need help or a new code, please contact your [facility] or call [phone] for assistance.        Care EveryWhere ID     This is your Care EveryWhere ID. This could be used by other organizations to access your [place] medical records  [id number]         Blood Pressure from Last 3 Encounters:   18 (!) 106/92   18 108/62   18 103/78    Weight from Last 3 Encounters:   18 86 kg (189 lb 9.5 oz)   18 89.3 kg (196 lb 13.9 oz)   18 86.2 kg (190 lb)              We Performed the Following     Glucose by meter     Glucose by meter          Today's Medication Changes      Notice     This visit is during an admission. Changes to the med list made in this visit will be reflected in the After Visit Summary of the admission.             Primary Care Provider Office Phone # Fax #    CLAIRE Rosas [phone]       [address]  [facility] [address]        Equal Access to Services     GIULIANA 81st Medical GroupBRENDA : Hadii aad ku hadasho Solillieali, waaxda luqadaha, qaybta kaalmada adeegyada, willie dorman . So [facility] [phone].    ATENCIÓN: Si habla español, tiene a smith disposición servicios gratuitos de asistencia lingüística. [facility] [phone].    We comply with applicable federal civil rights laws and [place] laws. We do not discriminate on the basis of race, color, national origin, age, disability, sex, sexual orientation, or gender identity.            Thank you!     Thank you for choosing [facility]  for your care. Our goal is always to provide you with excellent care. Hearing back from our patients is one way we can continue to improve our services. Please take a few minutes to complete the written survey that you may receive in the mail after your visit with us. Thank you!             Your Updated  Medication List - Protect others around you: Learn how to safely use, store and throw away your medicines at www.disposemymeds.org.      Notice     This visit is during an admission. Changes to the med list made in this visit will be reflected in the After Visit Summary of the admission.

## 2018-06-12 NOTE — PROGRESS NOTES
Transplant Social Work Services Progress Note    Collaborated with: Cards 2, Neuro, family, and Bedside RN    Data: SW attended care conference today (please see RN CC for full note).   Intervention: Discussed possibility of Sohan going to [facility] for SLP and possible [place] removal.   Assessment: Family in agreement with making referral to [facility]. They understand that there are limited options due to lvad and [place].   Education provided by SW: limited options for TCU and limited goals due to baseline.   Plan:    Discharge Plans in Progress: possible d/c to [facility]    Barriers to d/c plan: insulin pump and needing q1hr BS checks, [place], lvad    Follow up Plan: SW will continue to follow.    Pager [phone]

## 2018-06-12 NOTE — PROCEDURES
Procedure Date: 06/11/2018      EEG #:  -1       TYPE OF STUDY:  Video EEG day 1.      DATE OF STUDY:  06/11/2018.      SOURCE FILE DURATION:  9 hours and 6 minutes.      PATIENT INFORMATION:  A 68-year-old male who presents with new-onset aphasia, found to have left hemispheric stroke.  EEG is being done to evaluate for seizures.      MEDICATIONS:  Levetiracetam, melatonin, thiamine, Provigil, insulin, heparin .       TECHNICAL SUMMARY: This video EEG monitoring procedure was performed with 23 scalp electrodes in 10-20 system placements, and additional scalp, precordial and other surface electrodes used for electrical referencing and artifact detection. Video was reviewed intermittently by EEG technologist and physician for electroclinical seizures.      BACKGROUND:  The patient has diffuse generalized theta slowing that is 5-6 Hz in frequency with maximum slowing in the left frontotemporal region CZ.  At times the slowing in the left frontotemporal region does become rhythmic.  A good example of this focal rhythmic slowing is at 19:24:47.  He does have a parietooccipital rhythm up to 6 Hz.  The patient is awake for much of this record on this day.  He did fall asleep for a brief segment towards the end of the record and from sleep architecture, he was in stage I sleep.      ACTIVATION PROCEDURES:  The patient does follow simple commands.  On right, he is able to squeeze, on left, he is not.  Photic stimulation and hyperventilation was not done.      EPILEPTIFORM DISCHARGES:  None.      ICTAL:  None.      IMPRESSION:  Video EEG day 1 is abnormal due to the presence of diffuse generalized theta slowing with maximum slowing in the left frontotemporal region.  These findings are consistent with a moderate diffuse encephalopathy with maximum cortical dysfunction in the left frontotemporal region, which is most likely due to patient's history of left hemispheric ischemia.  No electrographic seizures or  epileptiform discharges were seen in this record and clinical correlation is advised.         NORMA SALINAS MD             D: 2018   T: 2018   MT: BUFFY      Name:     DANIEL RICHARD   MRN:      [id number]        Account:        [id number]   :      1950           Procedure Date: 2018      Document: [id number]

## 2018-06-12 NOTE — PROGRESS NOTES
Cardiology Heart failure Progress Note    Assessment & Plan   This is a 67 yo M with a history of ICM s/p HM II in 2012 as destination therapy with a course complicated by hemolysis, pump thrombosis and multiple strokes (subcortical, R PICA, R MCA), PFO s/p closure in 2012,MVr with carbomedics ring ICD placement in 2011, latent TB, PFO s/p closure in 2012, CKD presenting with new altered mental status and aphasia CT showing large R MCA encephalomalacia and CTA showing occluded petrous L ICA of unclear chronicity, CT scan 6/7 shows new large MCA stroke. INR on admission was 2     - heart failure 2/2 ICM s/p HM II as DT in 2012  - Stage D NYHA class III  - complicated by hemolysis, suspected pump thrombosis with persistently elevated LDH and strokes admitted with new L ICA stroke.  -PTA cardiac meds are: metoprolol XL 50mg OD, losartan 25mg OD, asa, lasix 20mg PRN- will allow permissive hypertension per neuro protocol, when possible will resume PTA medications to obtain MAP's <80   -  known pump thrombosis and is not a candidate for pump exchange or further therapies- resume coumadin today  - MAP in the 90's SCr at baseline- consider restarting losartan at 12.5mg OD (1/2 of home dose)  - Anticoagulated with coumadin pta (INR goal 1.8-2.3) INR on admission 2,     - new LMCA stroke  - restarting losartan today  - PT/OT  - speech therapy- on TF currently, now ok for puree diet and thick nectar  - history of seizures- continue keppra-    - BPH -On tamsulosin and finasteride    - UTI- enterobacter- abx per primary team - on cefepime currently will transition to bactrim to complete 5 d    Feeding- TF and pureed diet  DVT prophylaxis- coumadin for LVAD  Dispo- d/c home or TCU in 2-3d  Code-DNR/DNI    Case discussed with Dr. Traci Ayala  Cardiology fellow, PGY-4    Interval History   Afebrile 's -110/80-90's  WBC trending down to 10 from 12  On cefepime for enteroccus UTI        Physical  Exam   Temp: 98.4  F (36.9  C) Temp src: Oral BP: 122/86   Heart Rate: 113 Resp: 18 SpO2: 90 % O2 Device: None (Room air)    Vitals:    06/07/18 0400 06/08/18 0400 06/09/18 0550   Weight: 88.7 kg (195 lb 8.8 oz) 66.6 kg (146 lb 13.2 oz) 86 kg (189 lb 9.5 oz)     Vital Signs with Ranges  Temp:  [98  F (36.7  C)-98.8  F (37.1  C)] 98.4  F (36.9  C)  Heart Rate:  [110-128] 113  Resp:  [16-18] 18  BP: (102-128)/(86-99) 122/86  SpO2:  [90 %-100 %] 90 %  I/O last 3 completed shifts:  In: 1969.72 [I.V.:439.72; NG/GT:280]  Out: 650 [Urine:650]    Heart Rate: 113, Blood pressure 122/86, temperature 98.4  F (36.9  C), temperature source Oral, resp. rate 18, height 1.829 m (6'), weight 86 kg (189 lb 9.5 oz), SpO2 90 %.  189 lbs 9.53 oz  GEN: obtunded,  NAD.  CV:  LVAD humm   LUNGS:  Clear to auscultation bilaterally   ABD:  Active bowel sounds, soft, non-tender/non-distended.  No rebound/guarding/rigidity.  EXT:  No edema or cyanosis.      Medications     IV fluid REPLACEMENT ONLY       IV fluid REPLACEMENT ONLY       HEParin 750 Units/hr (06/12/18 0807)     insulin (regular) 8 Units/hr (06/12/18 1219)     - MEDICATION INSTRUCTIONS -       - MEDICATION INSTRUCTIONS -       Reason ACE/ARB/ARNI order not selected       Reason beta blocker order not selected         aspirin  81 mg Oral or Feeding Tube Daily     ceFEPIme (MAXIPIME) IV  2 g Intravenous Q24H     doxazosin  2 mg Oral or Feeding Tube Daily     finasteride  5 mg Oral or Feeding Tube Daily     levETIRAcetam  750 mg Oral or Feeding Tube BID     loratadine  10 mg Oral or Feeding Tube Daily     melatonin  10 mg Oral or Feeding Tube At Bedtime     modafinil  100 mg Oral Daily     multivitamins with minerals  15 mL Per Feeding Tube Daily     pantoprazole  20 mg Oral or Feeding Tube QAM AC     potassium & sodium phosphates  1 packet Oral or Feeding Tube 4x Daily     protein modular  1 packet Per Feeding Tube TID     thiamine  100 mg Oral or Feeding Tube Daily       Data      Recent Labs  Lab 06/12/18  0627 06/11/18  1247 06/11/18  0600 06/10/18  0521 06/09/18  0527 06/08/18  1923 06/08/18  0352 06/06/18 2116 06/06/18 2115   WBC  --  10.8  --  12.1*  --  10.2  --   --  8.4   HGB  --  8.2*  --  8.4*  --  8.5*  --   --  8.5*   MCV  --  97  --  96  --  95  --   --  95   PLT  --  181  --  199  --  188  --   --  219   INR 1.54*  --  1.71* 1.81* 2.33*  --  2.21*  --  1.94*   NA  --   --   --  145* 141  --  141  --  136   POTASSIUM  --   --   --  5.1 4.1  --  4.4  --  4.0   CHLORIDE  --   --   --  116* 114*  --  112*  --  106   CO2  --   --   --  17* 20  --  17*  --  21   BUN  --   --   --  43* 36*  --  28  --  26   CR 2.08*  --   --  1.84* 2.01*  --  1.97*  --  2.14*   ANIONGAP  --   --   --  11 8  --  13  --  8   JOSÉ  --   --   --  8.1* 8.1*  --  8.2*  --  7.9*   GLC  --   --   --  410* 234*  --  147*  --  153*   ALBUMIN  --   --   --   --   --   --   --   --  3.2*   PROTTOTAL  --   --   --   --   --   --   --   --  7.5   BILITOTAL  --   --   --   --   --   --   --   --  0.7   ALKPHOS  --   --   --   --   --   --   --   --  117   ALT  --   --   --   --   --   --   --   --  19   AST  --   --   --   --   --   --   --   --  50*   TROPI  --   --   --   --   --   --   --   --  <0.015   TROPONIN  --   --   --   --   --   --   --  0.01  --        No results found for this or any previous visit (from the past 24 hour(s)).

## 2018-06-12 NOTE — PROGRESS NOTES
[facility]  Vascular Neurology Progress Note  06/12/2018    Patient Name: Sohan Rendon  Admission Date: 6/6/2018  Date of Service: 06/12/2018  Current hospital day: Hospital Day: 7  Reason for Consult : LT MCA stroke  Chief Complaint : Aphasia  Primary Care Physician  :   Shilpi Agosto  Code Status   Full Code    24 hours events:    6/10 - Leukocytosis trending up. UA positive. Floor status. Linezolid started for probable VRE. Started on Insulin drip in the evening.    6/11- glucose improving but still elevated. Started on insulin drip. Started Modafinil.    6/12- Family conference, not planning PEG tube at this time. Will restart coumadin.     Problem List:  LVAD thrombus on Anticoagulation   Aphasia 2/2 New LT MCA Stroke Distribution     Assessment:  Sohan Rendon is a 68 year old male with PMH of HTN, CHF on LVAD with known thrombus on Anticoagulation warfarin, RT MCA stroke, who presents with New onset aphasia  Found to have LT parietal ischemic infarct, etiology likely LT ICA thrombus most likley 2/2 LVAD Thrombus. TPA was not given for High INR and plan for no intervention because of MRS 4 at baseline.       From a neurologic perspective: Patient has had a significant stroke in the left MCA distribution. It is very difficult to predict the degree of improvement in this acute setting. The patient had a baseline of being bed bound prior to the stroke. There is no doubt that he will have some deficits from this stroke. However, how close he gets to be back to his baseline is very difficult to determine. This was explained to the family. Of course, this is in the setting of a patient with an LVAD thrombus that can cause new strokes at any time.     We appreciate the help of the cardiology team in the management of this difficult and complex situation.     Pertinent Studies  LDL 92  A1C 4.7  CT Head 6/7 shows LT Parietal Stroke    Recommendation:  # Global aphasia  # L  petrous ICA occlusion, unclear chronicity, not seen on CTA 2014  # Encephalopathy  - Neurochecks Q 4 hr  - Permissive HTN; labetalol PRN for MAPs >80  - Euthermia, Euglycemia  - switch to heparin + ASA  - Heparin bridge to coumadin  - Modafanil 100mg qday for improved alertness    # LVAD with Known Thrombosis- not a candidate for pump exchange or further therapies per cardiology  # CHF   - Lasix PRN held   - Heparin bridge to coumadin     # H/o seizures  - Keppra 750 mg BID    # Hyperglycemia  Insulin drip. Glucose levels improved.    # HTN  - holding home metoprolol, losartan      # BPH  - Tamsulosin, finasteride      Other home meds:  - thiamine       Patient will be transferred to the cardiology service. We greatly appreciate their help in the management of this complex case.     Jasper Shelley MD  Neurology resident, PGY-2  (P) [phone]      Prior to Admission Medications   Prior to Admission Medications   Prescriptions Last Dose Informant Patient Reported? Taking?   ALEK CONTOUR test strip   No No   Sig: USE TO TEST BLOOD SUGAR 2 TIMES DAILY OR AS DIRECTED.   Melatonin 10 MG TABS tablet 6/5/2018 at PM  No No   Sig: Take 1 tablet (10 mg) by mouth At Bedtime   Metoprolol Succinate (TOPROL XL PO) 6/6/2018 at AM Daughter Yes Yes   Sig: Take 50 mg by mouth daily    ORDER FOR DME   No No   Sig: Equipment being ordered: Lift chair.    Please see indications and face-to-face encounter details in 2/3/15 Office Visit note.   acetaminophen (TYLENOL) 325 MG tablet 6/6/2018 at 1300  Yes Yes   Sig: Take 2 tablets (650 mg) by mouth every 6 hours as needed for mild pain   aspirin 81 MG tablet 6/6/2018 at Unknown time  No Yes   Sig: Take 1 tablet (81 mg) by mouth daily   bisacodyl (DULCOLAX) 10 MG Suppository Past Week at Unknown time Daughter No Yes   Sig: Place 1 suppository (10 mg) rectally daily as needed for constipation   blood glucose monitoring (ALEK MICROLET) lancets   No No   Sig: USE TO TEST BLOOD SUGAR 2  TIMES DAILY OR AS DIRECTED   blood glucose monitoring (NO BRAND SPECIFIED) lancets   No No   Sig: Use to test blood sugars 2 times daily or as directed.   finasteride (PROSCAR) 5 MG tablet 6/6/2018 at AM Daughter No Yes   Sig: Take 1 tablet (5 mg) by mouth daily   furosemide (LASIX) 20 MG tablet 6/6/2018 at Unknown time Daughter No Yes   Sig: Take 1 tablet (20 mg) by mouth as needed   levETIRAcetam (KEPPRA) 100 MG/ML solution 6/6/2018 at AM  No Yes   Sig: Take 7.5 mLs (750 mg) by mouth every 12 hours   loratadine (CLARITIN) 10 MG tablet 6/6/2018 at Unknown time  No Yes   Sig: TAKE 1 TABLET (10 MG) BY MOUTH DAILY   losartan (COZAAR) 25 MG tablet 6/6/2018 at Unknown time  Yes Yes   Sig: Take 1 tablet (25 mg) by mouth daily   nitroGLYcerin (NITROSTAT) 0.4 MG sublingual tablet   No Yes   Sig: Place 1 tablet (0.4 mg) under the tongue every 5 minutes as needed for chest pain   nystatin-triamcinolone (MYCOLOG II) cream 6/5/2018  No No   Sig: Apply 1 g topically 2 times daily for 10 days   order for DME   No No   Sig: Equipment being ordered: Bilateral leg supports for manual wheelchair.   order for DME   No No   Sig: Equipment being ordered: Reclining manual w/c with bilateral elevating leg rests.   order for DME   No No   Sig: Equipment being ordered: Hospital Bed, fully electric.   order for DME   No No   Sig: Equipment being ordered: Wheelchair, manual.   order for DME   No No   Sig: Equipment being ordered: Wheelchair, manual, with elevated leg rests and tilt in space back.  Please fax to [employer]; I called them 11/26/16 and they requested the new order.  Face to face is in my 10/26/16 note.   order for DME   No No   Sig: Equipment being ordered: Cushioned heel boots.   order for DME   No No   Sig: Bilateral heel protective cushioning boots.  Dx: previous stroke with left hemiplegia.  Duration of need: 99 months.   order for DME   No No   Sig: Equipment being ordered: Nebulizer   pantoprazole (PROTONIX) 20 MG EC tablet  6/6/2018 at AM  No Yes   Sig: Take 1 tablet (20 mg) by mouth every morning (before breakfast)   senna-docusate (SENOKOT-S;PERICOLACE) 8.6-50 MG per tablet Past Week at Unknown time  Yes Yes   Sig: Take 1-2 tablets by mouth 2 times daily as needed for constipation   simethicone (MYLICON) 80 MG chewable tablet Past Month at Unknown time  Yes Yes   Sig: Take 80 mg by mouth every 6 hours as needed for flatulence or cramping   tamsulosin (FLOMAX) 0.4 MG capsule 6/6/2018 at Unknown time  No Yes   Sig: Take 2 capsules (0.8 mg) by mouth daily   thiamine (VITAMIN B-1) 100 MG tablet 6/6/2018 at Unknown time  No Yes   Sig: Take 1 tablet (100 mg) by mouth daily   warfarin (COUMADIN) 1 MG tablet 6/6/2018 at 0700  No Yes   Sig: Take 3 tablets (3 mg) by mouth daily Or as directed after INR dosing changes      Facility-Administered Medications: None       Medications     IV fluid REPLACEMENT ONLY       IV fluid REPLACEMENT ONLY       HEParin 750 Units/hr (06/12/18 0807)     insulin (regular) 6 Units/hr (06/12/18 0800)     - MEDICATION INSTRUCTIONS -       - MEDICATION INSTRUCTIONS -       Reason ACE/ARB/ARNI order not selected       Reason beta blocker order not selected         aspirin  81 mg Oral or Feeding Tube Daily     ceFEPIme (MAXIPIME) IV  2 g Intravenous Q24H     doxazosin  2 mg Oral or Feeding Tube Daily     finasteride  5 mg Oral or Feeding Tube Daily     levETIRAcetam  750 mg Oral or Feeding Tube BID     loratadine  10 mg Oral or Feeding Tube Daily     melatonin  10 mg Oral or Feeding Tube At Bedtime     modafinil  100 mg Oral Daily     multivitamins with minerals  15 mL Per Feeding Tube Daily     pantoprazole  20 mg Oral or Feeding Tube QAM AC     potassium & sodium phosphates  1 packet Oral or Feeding Tube 4x Daily     protein modular  1 packet Per Feeding Tube TID     thiamine  100 mg Oral or Feeding Tube Daily         Physical Exam   Objective:  Temp: 98.6  F (37  C) Temp  Min: 98  F (36.7  C)  Max: 98.8  F (37.1   C)  Resp: 18 Resp  Min: 16  Max: 18  SpO2: 100 % SpO2  Min: 99 %  Max: 100 %  Heart Rate: 116 Heart Rate  Min: 109  Max: 128       Intake/Output Summary (Last 24 hours) at 06/12/18 1450  Last data filed at 06/12/18 1100   Gross per 24 hour   Intake          1859.72 ml   Output              650 ml   Net          1209.72 ml         General:  Lying in bed, squeezing eyes closed, moving the right side spontaneously  HEENT:  normocephalic/atraumatic, Mild epistaxis  Cardio:  RRR  Pulmonary:  no respiratory distress  Abdomen:  soft  Extremities:  no edema  Skin:  warm/dry      Neurologic  Mental Status:  Lying in bed,opens eyes to voice, minimal speech output  Cranial Nerves:  PERRL, EOMI with normal smooth pursuit, facial movements symmetric, no dysarthria, tongue protrusion midline  Motor:  no abnormal movements, increaed tone in LUE, LUE/LLE plegic but withdraw minimally to pain. RLE 2/5, RUE 5/5. No abnormal movements.  Reflexes:  2+ and symmetric throughout, no clonus, left toe up  Sensory:  responds in all four extremities  Coordination:  No Ataxia in UE.  Station/Gait:  deferred    Data       Labs:  CBC    Recent Labs  Lab 06/11/18  1247 06/10/18  0521 06/08/18  1923 06/06/18  2115   WBC 10.8 12.1* 10.2 8.4   HGB 8.2* 8.4* 8.5* 8.5*    199 188 219       COAGS    Recent Labs  Lab 06/12/18  0627 06/11/18 2057 06/11/18  0600 06/10/18  0521 06/09/18  0527  06/06/18  2115   INR 1.54*  --  1.71* 1.81* 2.33*  < > 1.94*   PTT  --   --   --   --   --   --  51*   AXA 0.44 0.45  --   --   --   --   --    < > = values in this interval not displayed.    INR:  Recent Labs   Lab Test  06/12/18   0627  06/11/18   0600  06/10/18   0521  06/09/18   0527  06/08/18   0352   INR  1.54*  1.71*  1.81*  2.33*  2.21*        BMP    Recent Labs  Lab 06/10/18  0521 06/09/18  0527 06/08/18  0352 06/06/18  2115   * 141 141 136   POTASSIUM 5.1 4.1 4.4 4.0   CHLORIDE 116* 114* 112* 106   CO2 17* 20 17* 21   BUN 43* 36* 28 26   CR  1.84* 2.01* 1.97* 2.14*   JOSÉ 8.1* 8.1* 8.2* 7.9*       Liver panel:  Recent Labs   Lab Test  06/06/18   2115  06/02/18   1214  05/31/18   0432  05/10/18   1045  05/04/18   1322   PROTTOTAL  7.5  7.0  7.2  7.4  7.4   ALBUMIN  3.2*  3.0*  3.1*  3.1*  3.2*   BILITOTAL  0.7  0.6  0.6  0.5  0.6   ALKPHOS  117  102  105  95  93   AST  50*  64*  Unsatisfactory specimen - hemolyzed  43  59*   ALT  19  20  23  16  16         Recent Labs  Lab 06/09/18  2043   CULT >100,000 colonies/mLEnterobacter cloacae complex*       LIPID Profile:   Cholesterol   Date Value Ref Range Status   06/08/2018 152 <200 mg/dL Final   05/10/2018 151 <200 mg/dL Final     HDL Cholesterol   Date Value Ref Range Status   06/08/2018 38 (L) >39 mg/dL Final   05/10/2018 35 (L) >39 mg/dL Final     LDL Cholesterol Calculated   Date Value Ref Range Status   06/08/2018 92 <100 mg/dL Final     Comment:     Desirable:       <100 mg/dl   05/10/2018 90 <100 mg/dL Final     Comment:     Desirable:       <100 mg/dl     Triglycerides   Date Value Ref Range Status   06/08/2018 113 <150 mg/dL Final   05/10/2018 131 <150 mg/dL Final     No results found for: CHOLHDLRATIO    A1C:   Recent Labs   Lab Test  06/07/18   0555  08/04/17   0830  07/21/16   1119  09/03/14   0648   A1C  4.7  4.8  5.2  8.0*       Troponin I:   Recent Labs   Lab Test  06/06/18   2115  05/10/18   1045  05/02/18   0049  03/07/18   1555  11/22/17   1330  05/03/17   0350  05/02/17   2247  02/11/17   0651   TROPI  <0.015  <0.015  <0.015  <0.015  <0.015  <0.015  The 99th percentile for upper reference range is 0.045 ug/L.  Troponin values in   the range of 0.045 - 0.120 ug/L may be associated with risks of adverse   clinical events.    <0.015  The 99th percentile for upper reference range is 0.045 ug/L.  Troponin values in   the range of 0.045 - 0.120 ug/L may be associated with risks of adverse   clinical events.    <0.015  The 99th percentile for upper reference range is 0.045 ug/L.  Troponin values  in   the range of 0.045 - 0.120 ug/L may be associated with risks of adverse   clinical events.           Jasper Shelley MD  Neurology resident, PGY-2  (P) [phone]

## 2018-06-12 NOTE — PROGRESS NOTES
Patient A&O X 4. VSS. LVAD numbers WNL, except Flow ---. Dressing changed, C/D/I. Speech re-evaluated patient today, now on DD1 diet and nectar thick liquids. TF continue through NG at 50mL/hr, pt tolerating well. Pt continues on heparin gtt at 750U/hr, next 10a recheck with AM labs. With PEG placement on hold, restart coumadin this evening. Condom catheter in place. Continues on insulin gtt, algorithm 4, bouncing between 6 to 8 U/hr during shift. EEG discontinued today. After care conference today, pt now DNR/DNI, cardiology now primary team, and possible discharge or TCU in 2-3 days. Pt has full function of right arm only, nonverbal throughout shift. Tylenol given x 1 for restlessness/discomfort (per son). Will continue to monitor and notify Cards 2 with any questions or concerns.

## 2018-06-12 NOTE — PLAN OF CARE
Problem: Patient Care Overview  Goal: Plan of Care/Patient Progress Review  Outcome: No Change  D: S/p CVA. LVAD.  I/A: VSS. PRN Tylenol given for pain. Sinus rhythm with regular PVCs. Family at bedside providing supportive cares and repositioning. Tube feeds running at 50/hr. Insulin drip continues at 8 u/hr with relatively stable blood sugars. LVAD numbers WNL. EEG in place and monitoring.  Condom cath placed per family's request. Family also requested additional pain medicine. MD was paged and came to see patient and family. MD ordered Atarax due to patient's itching. Pt did relax and sleep for a couple hours. Family also noted that patient had a bloody nose and was coughing up bloody sputum. Bloody nose did stop on it's own.  Mostly a post-nasal drip.  P: Continue to monitor. Blood sugar checks q 1-2 hours.

## 2018-06-12 NOTE — PLAN OF CARE
Problem: Stroke (Ischemic) (Adult)  Goal: Signs and Symptoms of Listed Potential Problems Will be Absent, Minimized or Managed (Stroke)  Signs and symptoms of listed potential problems will be absent, minimized or managed by discharge/transition of care (reference Stroke (Ischemic) (Adult) CPG).   Outcome: No Change  D: AMS and aphasia. New stroke MCA    I: Monitored vitals and assessed pt status.   Changed:  Running: heparin 900 cc/hr Next 10a @ 0500, Insulin Alg 4.   PRN:    A: A0x4. VSS. Afebrile. Urinating incontinent of bowel and bladder. BM x 1 loose. Only mobility is in the right arm. Movement is non purposeful. Patient does purposely spit out sputum after a cough. Will occasionally let family/staff suction mouth after coughing. DO NOT USE FOAM MOUTH SWAB. Patient will bite off material. No communication present. All other extremities immobile. Cannot determine if patient has any feeling on left side, even during finger stick pokes. Patient will pick at NG tube and leads. Patient seems to become agitated when moving/turning.     50 cc/hr TF. Q 1 hour. Found a very tiny cut in anal opening. Cleaned area and applied barrier     P: Continue to monitor Pt status and report changes to treatment team.

## 2018-06-12 NOTE — PHARMACY-ANTICOAGULATION SERVICE
Clinical Pharmacy - Warfarin Dosing Consult     Pharmacy has been consulted to manage this patient s warfarin therapy.  Indication: LVAD/RVAD  Therapy Goal:  (1.8-2.3)  OP Anticoag Clinic: [facility]  Warfarin Prior to Admission: Yes  Warfarin PTA Regimen: Most recent dosing has been 0.5-3 mg  Significant drug interactions: aspirin    6/10 INR: 1.81  6/11 INR: 1.71  6/12 INR: 1.54    Recommend warfarin 2 mg today.  Pharmacy will monitor Sohan Justice daily and order warfarin doses to achieve specified goal.      Please contact pharmacy as soon as possible if the warfarin needs to be held for a procedure or if the warfarin goals change.

## 2018-06-12 NOTE — PLAN OF CARE
Problem: Patient Care Overview  Goal: Plan of Care/Patient Progress Review  PT: PT orders received.  Per discussion with team and OT, pt with no in-patient PT needs at this time.  Is bed bound and getting total assist for all cares.  Orders completed at this time.

## 2018-06-12 NOTE — MR AVS SNAPSHOT
After Visit Summary   2018    Sohan Rendon    MRN: [id number]           Patient Information     Date Of Birth          1950        Visit Information        Provider Department      2018 7:00 AM [facility] EEG TECH 4 [facility] EEG        Today's Diagnoses     Encephalopathy    -  1       Follow-ups after your visit        Your next 10 appointments already scheduled     2018  9:45 AM CDT   Ventricular Assist Device with Evon Lemons MD   [facility] [place]    [address]  [address]  [facility] [address] [phone]            2018 10:00 AM CDT   Home Follow Up with Raghu Almodovar MD   [facility] ([facility])    [address] [place] [address] [address]  [facility] [address] [phone]              Who to contact     Please call your clinic at [phone] to:    Ask questions about your health    Make or cancel appointments    Discuss your medicines    Learn about your test results    Speak to your doctor            Additional Information About Your Visit        MyChart Information     Curves is an electronic gateway that provides easy, online access to your medical records. With Curves, you can request a clinic appointment, read your test results, renew a prescription or communicate with your care team.     To sign up for Curves visit the website at [email or web address]   You will be asked to enter the access code listed below, as well as some personal information. Please follow the directions to create your username and password.     Your access code is: [email or web address]  Expires: 2018  3:38 PM     Your access code will  in 90 days. If you need help or a new code, please contact your [facility] or call [phone] for assistance.        Care EveryWhere ID     This is your Care EveryWhere ID. This could be used by other organizations to access your  [facility] medical records  [id number]         Blood Pressure from Last 3 Encounters:   06/12/18 122/86   05/31/18 108/62   05/16/18 103/78    Weight from Last 3 Encounters:   06/09/18 86 kg (189 lb 9.5 oz)   05/31/18 89.3 kg (196 lb 13.9 oz)   05/16/18 86.2 kg (190 lb)              We Performed the Following     Glucose by meter     Glucose by meter     Glucose by meter     Glucose by meter     Glucose by meter          Today's Medication Changes      Notice     This visit is during an admission. Changes to the med list made in this visit will be reflected in the After Visit Summary of the admission.             Primary Care Provider Office Phone # Fax #    CLAIRE Rosas [phone]       [address]  [facility] [address]        Equal Access to Services     ALCON SKINNER : Hadii aad ku hadasho Soedna, waaxda luqadaha, qaybta kaalmada neelimayaavery, willie dorman . So [facility] [phone].    ATENCIÓN: Si habla español, tiene a smith disposición servicios gratuitos de asistencia lingüística. Juliame al [phone].    We comply with applicable federal civil rights laws and [place] laws. We do not discriminate on the basis of race, color, national origin, age, disability, sex, sexual orientation, or gender identity.            Thank you!     Thank you for choosing [facility]  for your care. Our goal is always to provide you with excellent care. Hearing back from our patients is one way we can continue to improve our services. Please take a few minutes to complete the written survey that you may receive in the mail after your visit with us. Thank you!             Your Updated Medication List - Protect others around you: Learn how to safely use, store and throw away your medicines at www.disposemymeds.org.      Notice     This visit is during an admission. Changes to the med list made in this visit will be reflected in the After Visit Summary of the admission.

## 2018-06-12 NOTE — PLAN OF CARE
Problem: Patient Care Overview  Goal: Plan of Care/Patient Progress Review  OT/CR 6C: Cancel and DEFER.  Acknowledge order placed for OT/CR eval and treat.  Upon chart review and discussion with RN/care team, Pt with no acute OT needs.  Pt is known to this therapist from previous admissions.  Pt is bedbound prior to admission with family providing total cares.  Family has been instructed on how to provide PROM to extremities during previous admissions.  No skilled needs identified.  Will discontinue OT orders.

## 2018-06-12 NOTE — CARE CONFERENCE
FAMILY/CARE CONFERENCE: 1 PM to 1:45 PM.   Family/Care Conference requested by Cardiology team to discuss plan of care and discharge planning.   Those in attendance pre-conference: Dr. Dafne Aviles (Cards 2 Attending), Dr. Jasper Shelley (Neuro Resident), Jesusita Reeves (Cards 2 Fellow), Vivi Toledo (LVAD Coordinator), Jenna Deutsch (LVAD SW), Anjelica Carroll RN CC (6C).   Those in attendance at family conference: Sohan Rendon (Patient), Edy Ruffin (Pt's son), Almaz Rendon (Pt's daughter), Antionette Laly (Pt's daughter), Donn Laly (Pt's daughter), Edy Rendon (Pt's brother), Dr. Jasper Shelley (Neuro Resident), Jesusita Reeves (Cards 2 Fellow), Vivi Toledo (LVAD Coordinator), Jenna Deutsch (LVAD SW), Anjelica Carroll RN CC (6C), Marichuy ESCOBAR.        Dr. Shelley addressed the conference with an update of pt's current medical condition stating that pt had a significant left-sided stroke which affected pt's language and with weakness. Dr. Shelley said that it is difficult to predict the the degree of improvement that pt will have as pt was bed bound prior to this stroke. Dr. Shelley said that pt may not improve to his baseline and may possibly be worse.      Dr. Reeves explained in detail that pt has a clot in his LVAD pump, it is being treated with Coumadin but not much more can be done as the clot cannot be prevented or controlled. Pt's family nodded acknowledgement. Dr. Reeves said that if pt's heart would stop that CPR would not be done. Pt's son Teddy and family verbally agreed to have pt be DNR/DNI.      Dr. Reeves said that the goal for pt is for him to eat food by mouth and be with his family. The risks of having a PEG placed, especially possibility of another stroke when Coumadin is held were explained in detail. Pt's son, Edy said that they do not want a PEG but they want to keep the NJ and try to feed pt by mouth. Dr. Reeves also discussed the risks of keeping an NJ in place and said that  the NJ could not remain longer than 14 days. Teddy verbally acknowledged understanding.      Jenna La Nena said that pt might be able to go to the  TCU and work with Speech Therapy until the NJ is removed. Jenna said that she will make a referral to  TCU. Teddy said that they are in agreement with this plan.   PLAN:   1. Pt is now DNR/DNI.   2. Pt will discharge when INR is therapeutic.   3. Jenna will make referral to  TCU.   [facility] to be removed at TCU after 14 days and pt will ADAT and eat for comfort.   5. Pt will return home from TCU when NJ is removed.

## 2018-06-12 NOTE — PROGRESS NOTES
Pt did not have any VAD related questions/concerns. Emotional support offered.   Pt's batteries were . Pt was given a new set of batteries, LOT [id number].

## 2018-06-12 NOTE — PLAN OF CARE
Problem: Patient Care Overview  Goal: Plan of Care/Patient Progress Review  Discharge Planner SLP   Patient plan for discharge: not discussed  Current status: The patient was seen for dysphagia tx this afternoon with family and  present. The patient required cues/encouragement for participation, but appeared to tolerate adequate safe swallowing with puree textures and nectar thick liquids. Unsure how consistent the patient's swallowing safety will be, so he may benefit from continued assessment prior to initiating an oral diet. However, should the patient's family wish to initiate an oral diet, would recommend dysphagia diet 1 and nectar thick liquids with supervision/assist and and safety precautions. Suspect inadequate oral intake if the patient initiates a PO diet, as he often refused PO during this assessment.   Barriers to return to prior living situation: weakness, unable to follow commands, dysphagia, TF  Recommendations for discharge: likely home with family assist   Rationale for recommendations: SLP tx for dysphagia for below baseline swallowing ability       Entered by: Candi Sifuentes 06/12/2018 1:49 PM

## 2018-06-13 NOTE — PROVIDER NOTIFICATION
Pt's daughter requesting something for pt for itching. Pt is on daily allergy medication. No rash noted. Offered lotion for dry skin. Passed on daughter's request to cardiology cross cover MD, pager [phone]. No new orders obtained.

## 2018-06-13 NOTE — CONSULTS
Diabetes/Hyperglycemia Management Consult    Chief Complaint patient with hyperglycemia even though he is not diabetic requiring very high insulin ggt changing TF to lower carb and cycling but needs to be off ggt for rehab.  Consult requested by: Dr. Leandro Ayala, Cardiology 2 team  History of Present Illness Sohan Rendon is a 68 year old man with hx of ischemic cardiomyopathay s/p LVAD in 2012, destination therapy, with complicated post-VAD course (hemolysis, pump thrombosis).  Also has hypertension, hyperlipidemia, GERD, CKD IV, anemia and hx of seizure. He has several had strokes and was bed-bound prior to presentation.  He presented 6/6/2018 with altered mental status and aphasia and was found to have a large R MCA encephalomalacia and occluded petrous L ICA of unclear chronicity.  Was found to have new large MCA stroke on 6/7.  He has had slow neurologic improvement.  He is not a candidate for LVAD exchange.  He is DNR.DNI.  Expected to discharge to TCU in a few days.    TwoCal HN started 6/7 2200.  To goal 6/9 0600.  Glucose on admission 150s, then 200 morning of 6/9, then 400s on 6/10.  Receiving IV insulin infusion since evening 6/10.  Lately rates 6-10 units/h required.  Enteral feeds to stop, then restart with Nepro 85 cc/h x 14 hours (plan to reach goal rate at 2200).  Pt has had loose stools. Dysphagia diet is also allowed, but has not had anything to eat yet.  Daughter plans to feed him this afternoon. Pt not verbalizing.  Only gesturing to express displeasure.  His daughter is concerned that the NJ feeding tube is very irritating and impairs pt's ability to try to swallow food.  She also believes it contributes to nasal congestion.  Pt did have a PEG tube for a few months after stroke in the past.  This was prior to his diabetes diagnosis and she's unaware of any past problem with enteral feed-related hyperglycemia.      Recent Labs  Lab 06/13/18  1430 06/13/18  1306 06/13/18  1155 06/13/18  1102  06/13/18  1005 06/13/18  0908  06/10/18  0521 06/09/18  0527  06/08/18  0352  06/06/18  2115   GLC  --   --   --   --   --   --   --  410* 234*  --  147*  --  153*   * 186* 197* 198* 199* 176*  < >  --   --   < >  --   < >  --    < > = values in this interval not displayed.      Diabetes Type: type 2  Diabetes Duration: Dx 2014. Started metformin 500 mg BID.  But need for medication resolved with diet change  Usual Diabetes Regimen:   BG monitoring daily in mornings- Amber Contour NExt  Moderated carbs in diet  Ability to [place] Prescribed Regimen: family implements BG checking  Diabetes Control:   At home glucose values are 80-120s in the mornings  Lab Results   Component Value Date    A1C 4.7 06/07/2018    A1C 4.8 08/04/2017    A1C 5.2 07/21/2016    A1C 8.0 09/03/2014     History of DKA: no  Able to Detect Hypoglycemia: unknown, unable to express likely  Usual Diabetes Care Provider: PCP, in home visits  Factors Impacting Glucose Control: enteral feeds, acute illness      Review of Systems  10 point ROS completed with pertinent positives and negatives noted in the HPI    Past medical, family and social histories are reviewed and updated.    Past Medical History  Past Medical History:   Diagnosis Date     Acute right MCA stroke (H) 6/2013    With L sided hemiparesis      Anemia of chronic disease     Baseline Hb 8-9     BPH (benign prostatic hyperplasia)      CHF (congestive heart failure), NYHA class IV (H) 10/9/2012    s/p HeartMate II.  Was on milrinone and dobutamine prior to LVAD      CKD (chronic kidney disease)     Baseline Cr=     Clostridium difficile colitis 12/2012      Dysphagia     PEG tube placed in 2012.  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  Normal carotid duplex 3/2014.       Esophagitis 12/2012    EGD with esophagitis and multiple douenal ulcers     Fracture of femoral neck, right,  closed (H) 2/3/2015    Presumed pathologic as patient is non-weight-bearing and suffered no trauma.  Family declined operative repair during hospital stay 1/23 - 1/30/15.     Gastric and duodenal angiodysplasia with hemorrhage 6/18/2015     GERD (gastroesophageal reflux disease)      Gout      Hematuria      HTN (hypertension)     LDL=59 (3/29/2014)     Hyperlipaemia      Ischemic cardiomyopathy      Mitral regurgitation     s/p MVR with Carbomedics ring      Myocardial infarction 1998    In [facility] without intervention      Nonsenile cataract     BE     PFO (patent foramen ovale)     s/p closure (10/9/2012)     TB lung, latent     s/p 9 months INH in 2012        Family History  Family History   Problem Relation Age of Onset     Family History Negative No family hx of      Glaucoma No family hx of      Macular Degeneration No family hx of      CANCER No family hx of      no skin cancer   Type 2 diabetes : none    Social History  Social History     Social History     Marital status:      Spouse name: N/A     Number of children: N/A     Years of education: N/A     Social History Main Topics     Smoking status: Former Smoker     Smokeless tobacco: Former User     Alcohol use No     Drug use: No     Sexual activity: Not on file     Other Topics Concern     Not on file     Social History Narrative   Lives with children  Was a  prior to being diabled      Physical Exam  /83 (BP Location: Left arm)  Temp 98  F (36.7  C) (Axillary)  Resp 18  Ht 1.829 m (6')  Wt 86 kg (189 lb 9.5 oz)  SpO2 99%  BMI 25.71 kg/m2    General:  Older man resting in bed, in no distress. Family at bedside  HEENT: NC/AT, PER and anicteric, non-injected, oral mucous membranes moist. NJ feeding, bridled  Lungs: unlabored respiration, no cough observed  ABD: rounded, LVAD  : condom cath to drainage bag  Skin: warm and dry, shiny skin on feet  MSK:  fluid movement right ext  Lymp: mild LE edema   Mental status: alert, not  responding verbally  Psych:  Calm, doesn't appear engaged    Laboratory  Recent Labs   Lab Test  06/12/18   0627  06/10/18   0521  06/09/18   0527   NA   --   145*  141   POTASSIUM   --   5.1  4.1   CHLORIDE   --   116*  114*   CO2   --   17*  20   ANIONGAP   --   11  8   GLC   --   410*  234*   BUN   --   43*  36*   CR  2.08*  1.84*  2.01*   JOSÉ   --   8.1*  8.1*     CBC RESULTS:   Recent Labs   Lab Test  06/11/18   1247   WBC  10.8   RBC  2.90*   HGB  8.2*   HCT  28.2*   MCV  97   MCH  28.3   MCHC  29.1*   RDW  23.2*   PLT  181       Liver Function Studies -   Recent Labs   Lab Test  06/06/18   2115   PROTTOTAL  7.5   ALBUMIN  3.2*   BILITOTAL  0.7   ALKPHOS  117   AST  50*   ALT  19       Active Medications  Current Facility-Administered Medications   Medication     acetaminophen (TYLENOL) solution 650 mg     aspirin suspension 81 mg     calamine 8-8 % lotion     dextrose 10 % 1,000 mL infusion     dextrose 10 % 1,000 mL infusion     glucose gel 15-30 g    Or     dextrose 50 % injection 25-50 mL    Or     glucagon injection 1 mg     glucose gel 15-30 g    Or     dextrose 50 % injection 25-50 mL    Or     glucagon injection 1 mg     glucose gel 15-30 g    Or     dextrose 50 % injection 25-50 mL    Or     glucagon injection 1 mg     finasteride (PROSCAR) suspension 5 mg     heparin  drip 25,000 units in 0.45% NaCl 250 mL (see additional administration details for dose)     heparin bolus from infusion pump     insulin 1 unit/mL in saline (NovoLIN, HumuLIN Regular) drip - ADULT IV Infusion     levETIRAcetam (KEPPRA) solution 750 mg     loratadine (CLARITIN) syrup 10 mg     losartan (COZAAR) suspension 12.5 mg     magnesium sulfate 2 g in water intermittent infusion     magnesium sulfate 4 g in 100 mL sterile water (premade)     medication instruction     Melatonin tablet 10 mg     modafinil (PROVIGIL) tablet 100 mg     multivitamins with minerals (CERTAVITE/CEROVITE) liquid 15 mL     ondansetron (ZOFRAN-ODT) ODT tab  4 mg    Or     ondansetron (ZOFRAN) injection 4 mg     pantoprazole (PROTONIX) suspension 20 mg     Patient is already receiving anticoagulation with heparin, enoxaparin (LOVENOX), warfarin (COUMADIN)  or other anticoagulant medication     potassium & sodium phosphates (NEUTRA-PHOS) Packet 1 packet     potassium chloride (KLOR-CON) Packet 20-40 mEq     potassium chloride 10 mEq in 100 mL intermittent infusion with 10 mg lidocaine     potassium chloride 10 mEq in 100 mL sterile water intermittent infusion (premix)     potassium chloride SA (K-DUR/KLOR-CON M) CR tablet 20-40 mEq     potassium phosphate 10 mmol in sodium chloride 0.9 % 250 mL intermittent infusion     potassium phosphate 15 mmol in sodium chloride 0.9 % 250 mL intermittent infusion     potassium phosphate 20 mmol in sodium chloride 0.9 % 250 mL intermittent infusion     potassium phosphate 20 mmol in sodium chloride 0.9 % 500 mL intermittent infusion     potassium phosphate 25 mmol in sodium chloride 0.9 % 500 mL intermittent infusion     protein modular (PROSource TF) 1 packet     Reason ACE/ARB/ARNI order not selected     Reason beta blocker order not selected     senna-docusate (SENOKOT-S;PERICOLACE) 8.6-50 MG per tablet 2 tablet     sulfamethoxazole-trimethoprim (BACTRIM/SEPTRA) suspension 160 mg     thiamine tablet 100 mg     warfarin (COUMADIN) tablet 3 mg     Warfarin Therapy Reminder (Check START DATE - warfarin may be starting in the FUTURE)       Current Diet    Active Diet Order      Dysphagia Diet Level 1 Pureed Nectar Thickened Liquids (pre-thickened or use instant food thickener)      Assessment  Sohan Rendon is a 68 year old man with type 2 diabetes and ischemic cardiomyopathay s/p LVAD in 2012, destination therapy, with complicated post-VAD course (hemolysis, pump thrombosis, strokes), presenting with AMS and aphasia and found to have new large MCA stroke, experiencing major hyperglycemia related to enteral feeds and acute illness.   When cycles off TFs this afternoon, will get a better sense of basal insulin need.  Anticipate hourly insulin rates to increase tonight when starts Nepro cycle.      Plan  -continue IV insulin infusion tonight  -add aspart for carbohydrate coverage tomorrow, based on estimated basal need  -plan for transition to NPH tomorrow  -pt's family will need teaching on insulin if to discharge to home on cycled enteral feeds    Doris Restrepo APRN [facility] [phone]    Diabetes Management Team job code: [phone]

## 2018-06-13 NOTE — PROGRESS NOTES
Cardiology Heart failure Progress Note    Assessment & Plan   This is a 67 yo M with a history of ICM s/p HM II in 2012 as destination therapy with a course complicated by hemolysis, pump thrombosis and multiple strokes (subcortical, R PICA, R MCA), PFO s/p closure in 2012,MVr with carbomedics ring ICD placement in 2011, latent TB, PFO s/p closure in 2012, CKD presenting with new altered mental status and aphasia CT showing large R MCA encephalomalacia and CTA showing occluded petrous L ICA of unclear chronicity, CT scan 6/7 shows new large MCA stroke. INR on admission was 2     - heart failure 2/2 ICM s/p HM II as DT in 2012  - Stage D NYHA class III  - complicated by hemolysis, suspected pump thrombosis with persistently elevated LDH and strokes admitted with new L ICA stroke.  -PTA cardiac meds are: metoprolol XL 50mg OD, losartan 25mg OD, asa, lasix 20mg PRN- will allow permissive hypertension per neuro protocol, when possible will resume PTA medications to obtain MAP's <80   -  known pump thrombosis and is not a candidate for pump exchange or further therapies- resume coumadin today  - MAP in the 90's SCr at baseline- consider restarting losartan at 12.5mg OD (1/2 of home dose)  - Anticoagulated with coumadin pta (INR goal 1.8-2.3) INR on admission 2,     - new LMCA stroke  - on half dose of home losartan  - PT/OT  - speech therapy- on TF currently, now ok for puree diet and thick nectar  - history of seizures- continue keppra-    - BPH -On tamsulosin and finasteride    - UTI- enterobacter- abx per primary team - on cefepime currently will transition to bactrim to complete 5 d    - Hypernatremia- in the setting of poor PO intake- D5W for 8h to correct Na, will increase free water    Feeding- TF will start cycling TF overnights  and pureed diet  DVT prophylaxis- coumadin for LVAD  Dispo- d/c home or TCU in 2-3d  Code-DNR/DNI    Case discussed with Dr. Traci Ayala  Cardiology fellow,  PGY-4    Interval History   Afebrile  No acute events  Requiring high doses of insulin       Physical Exam   Temp: 97.7  F (36.5  C) Temp src: Oral BP: (!) 82/62   Heart Rate: 114 Resp: 14 SpO2: 100 % O2 Device: None (Room air)    Vitals:    06/07/18 0400 06/08/18 0400 06/09/18 0550   Weight: 88.7 kg (195 lb 8.8 oz) 66.6 kg (146 lb 13.2 oz) 86 kg (189 lb 9.5 oz)     Vital Signs with Ranges  Temp:  [97.7  F (36.5  C)-99.1  F (37.3  C)] 97.7  F (36.5  C)  Heart Rate:  [106-132] 114  Resp:  [14-18] 14  BP: ()/(62-83) 82/62  SpO2:  [96 %-100 %] 100 %  I/O last 3 completed shifts:  In: 1963.25 [I.V.:373.25; NG/GT:540]  Out: 925 [Urine:925]    Heart Rate: 114, Blood pressure (!) 82/62, temperature 97.7  F (36.5  C), temperature source Oral, resp. rate 14, height 1.829 m (6'), weight 86 kg (189 lb 9.5 oz), SpO2 100 %.  189 lbs 9.53 oz  GEN: obtunded,  NAD.  CV:  LVAD humm   LUNGS:  Clear to auscultation bilaterally   ABD:  Active bowel sounds, soft, non-tender/non-distended.  No rebound/guarding/rigidity.  EXT:  No edema or cyanosis.      Medications     IV fluid REPLACEMENT ONLY       D5W       HEParin 900 Units/hr (06/13/18 1619)     - MEDICATION INSTRUCTIONS -       - MEDICATION INSTRUCTIONS -       Reason ACE/ARB/ARNI order not selected       Reason beta blocker order not selected       Warfarin Therapy Reminder         aspirin  81 mg Oral Daily     finasteride  5 mg Oral Daily     levETIRAcetam  750 mg Oral or Feeding Tube BID     loratadine  10 mg Oral or Feeding Tube Daily     losartan  12.5 mg Oral Daily     melatonin  10 mg Oral or Feeding Tube At Bedtime     modafinil  100 mg Oral Daily     multivitamins with minerals  15 mL Per Feeding Tube Daily     pantoprazole  20 mg Oral or Feeding Tube QAM AC     protein modular  1 packet Per Feeding Tube TID     sulfamethoxazole-trimethoprim  20 mL Oral BID     thiamine  100 mg Oral or Feeding Tube Daily       Data     Recent Labs  Lab 06/13/18  1453 06/13/18  0643  06/12/18  0627 06/11/18  1247 06/11/18  0600 06/10/18  0521 06/09/18  0527  06/06/18  2116 06/06/18  2115   WBC 12.7*  --   --  10.8  --  12.1*  --   < >  --  8.4   HGB 8.5*  --   --  8.2*  --  8.4*  --   < >  --  8.5*   *  --   --  97  --  96  --   < >  --  95     --   --  181  --  199  --   < >  --  219   INR  --  1.40* 1.54*  --  1.71* 1.81* 2.33*  < >  --  1.94*   *  --   --   --   --  145* 141  < >  --  136   POTASSIUM 4.8  --   --   --   --  5.1 4.1  < >  --  4.0   CHLORIDE 128*  --   --   --   --  116* 114*  < >  --  106   CO2 20  --   --   --   --  17* 20  < >  --  21   BUN 56*  --   --   --   --  43* 36*  < >  --  26   CR 2.20*  --  2.08*  --   --  1.84* 2.01*  < >  --  2.14*   ANIONGAP 10  --   --   --   --  11 8  < >  --  8   JOSÉ 8.5  --   --   --   --  8.1* 8.1*  < >  --  7.9*   *  --   --   --   --  410* 234*  < >  --  153*   ALBUMIN  --   --   --   --   --   --   --   --   --  3.2*   PROTTOTAL  --   --   --   --   --   --   --   --   --  7.5   BILITOTAL  --   --   --   --   --   --   --   --   --  0.7   ALKPHOS  --   --   --   --   --   --   --   --   --  117   ALT  --   --   --   --   --   --   --   --   --  19   AST  --   --   --   --   --   --   --   --   --  50*   TROPI  --   --   --   --   --   --   --   --   --  <0.015   TROPONIN  --   --   --   --   --   --   --   --  0.01  --    < > = values in this interval not displayed.    No results found for this or any previous visit (from the past 24 hour(s)).

## 2018-06-13 NOTE — PROVIDER NOTIFICATION
"Pt's daughter verbalizes concern that her father is \"acting like he did before he had his stroke\". Pt is restless. Grabbing at his head. She also is concerned that she feels his neck looks swollen. No change noted in neuro assessment, but pt is difficult to assess. Non verbal. Only has use of RUE. Discussed with cardiology resident Dr. Lopes. She will come to assess pt.  "

## 2018-06-13 NOTE — PLAN OF CARE
Problem: Ventricular Assist Device (Adult)  Goal: Signs and Symptoms of Listed Potential Problems Will be Absent, Minimized or Managed (Ventricular Assist Device)  Signs and symptoms of listed potential problems will be absent, minimized or managed by discharge/transition of care (reference Ventricular Assist Device (Adult) CPG).   Outcome: No Change  Patient admitted with MCA stroke. HM 2 VAD numbers stable, no alarms. Dressing still needs to be changed. Patient opens eyes and moving right arm, not speaking. Not wanting to eat today, attempted to feed patient magic cup, TF at 50 ml/hr stopped at 2 PM will restart at 6PM at a higher rate. Medications given as scheduled, order to hold the sodium phophate packet. ST on tele, vitals stable. Family supportive with cares and at bedside. Turned/repositioned q 2 hours. New IV placed for heparin gtt as infiltration occurred patients skin marked with skin marker around borders to monitor. Tylenol PRN given. Incontinent of stool x2 watery BMs. Condom catheter replaced. Insulin gtt at alg 4 BG in 170-199 range despite increase in units. Heparin gtt at 900 units/hr recheck xa at 1430. Patient to have stroke teaching tomorrow 6/13 at 1:45 PM. Continue to monitor and notify Cards 2 with changes/concerns.

## 2018-06-13 NOTE — PLAN OF CARE
Problem: Patient Care Overview  Goal: Plan of Care/Patient Progress Review  SLP: Session cancelled as  not present for session.  ST spoke with the daughter re: need for  for session as daughter indicates pt does not need one as he is nonverbal at this time.  ST indicated family may sign a waiver so  does not need to be present for therapy session.  Per daughter pt has not eaten anything after diet order entered.  ST reviewed the diet order rec with daughter for dysphagia diet level 1 with nectar thick liquids.  Good understanding noted.  ST rescheduled session for tomorrow at 1030

## 2018-06-13 NOTE — PLAN OF CARE
Problem: Patient Care Overview  Goal: Plan of Care/Patient Progress Review  RN  1. Pt will be comfortable.     D: Pt admitted with large MCA stroke. Hx of HM II from 2012 as DT with multiple strokes.     I/A: Vital signs stable, afebrile, nonverbal. -110 (Md notified of up-trend of HR). LVAD numbers WDL (flows continues at ---), driveline dressing intact. Condom cath in place with dark karely output. Multiple BM overnight. Daughter and son providing cares for pt. Insulin gtt continues on algorithm 4 from 6-10 units/hr. Heparin gtt continues at 750 units/hr, 10A therapeutic. Tube feeding running into NJ at 50 ml/hr. PRN tylenol given as available to decrease restlessness. One time 10mg Hydroxyzine given with good results (may benefit from PRN or scheduled at bedtime). Slept overnight. Frequently moving R arm.      P: Plan is to d/c to TCU when medically ready. Continue to monitor and notify MD with pertinent changes.

## 2018-06-13 NOTE — PROGRESS NOTES
CLINICAL NUTRITION SERVICES     Nutrition Prescription    RECOMMENDATIONS FOR MDs/PROVIDERS TO ORDER:  Consider endo consult if appropriate (if transitioning off insulin gtt and BG is high).     Recommendations already ordered by Registered Dietitian (RD):  Oral supplements  Kcal counts 6/14-6/16  Changed TF formula and transitioned to cycled, supplemental TFs    Future/Additional Recommendations:  For all recommendations, see prior nutrition notes     Diet: DD I + nectar-thick liquid diet ordered as per SLP rec 6/12. Per RN, no oral intake.     TFs: TwoCal HN at 50 mL/hr (1200 mL/day) via NDT provides 2400 kcals (27+ kcal/kg/day), 101 g PRO (1.1+ g/kg/day), 840 mL H2O, 263 g CHO and 6 g Fiber daily. Pt is ordered to receive 1 pkt ProSource TF modular TID.     BG: Hgb A1c of 4.8 on 8/4/17 and 4.7 on 6/7/18. Pt may have had BG checks PTA (lancets were on med list on admit). -222 yesterday (6/12). On insulin gtt.     INTERVENTIONS:  Implementation:  Collaboration with other providers, Enteral Nutrition: Team inquiring about carbohydrate content of TFs. Team requested lower carbohydrate TF formula and would like TFs cycled to help encourage oral intake. Modified TF formula to Nepro (lower in carbohydrate and K+ was 5.1 on 6/10). TFs changed to cycled, supplemental:  Nepro advancing to goal regimen of 85 mL/hr x 14 hours to provide 1190 mL TF, 2142+ kcals , 96+ g protein, 869 mL H2O, 192 g CHO, and 15 g fiber daily. Continue ProSource TF modular as ordered. Discussed with RN.   Medical food supplement therapy: Ordered Magic Cup between meals.   Calorie counts    Follow up/Monitoring:  Will continue to follow pt.    Sienna Flynn, MS, RD, LD, [facility]   6C Pgr: [phone]

## 2018-06-13 NOTE — MR AVS SNAPSHOT
"              After Visit Summary   6/13/2018    Sohan Rendon    MRN: [id number]           Patient Information     Date Of Birth          1950        Visit Information        Provider Department      6/13/2018 6:00 AM UC ICD REMOTE [facility]        Today's Diagnoses     Ischemic cardiomyopathy    -  1       Follow-ups after your visit        Your next 10 appointments already scheduled     Jun 14, 2018  1:45 PM CDT   Stroke/Tia - [address] [facility] [address] with Daysi Turner RN   [facility] [place], [facility] ([facility], [facility])    [address]  [facility] [address]              Appointment is located at [address] [place] [address]            Jun 22, 2018 10:00 AM CDT   Home Follow Up with Raghu Almodovar MD   [facility] ([facility])    [address]  [address]  [facility] [address] [phone]              Who to contact     If you have questions or need follow up information about today's clinic visit or your schedule please contact [facility] directly at [phone].  Normal or non-critical lab and imaging results will be communicated to you by Blippexhart, letter or phone within 4 business days after the clinic has received the results. If you do not hear from us within 7 days, please contact the clinic through Blippexhart or phone. If you have a critical or abnormal lab result, we will notify you by phone as soon as possible.  Submit refill requests through Symvato or call your pharmacy and they will forward the refill request to us. Please allow 3 business days for your refill to be completed.          Additional Information About Your Visit        MyChart Information     Symvato lets you send messages to your doctor, view your test results, renew your prescriptions, schedule appointments and more. To sign up, go to [email or web address] . Click on \"Log in\" on the left side " "of the screen, which will take you to the Welcome page. Then click on \"Sign up Now\" on the right side of the page.     You will be asked to enter the access code listed below, as well as some personal information. Please follow the directions to create your username and password.     Your access code is: [email or web address]  Expires: 2018  3:38 PM     Your access code will  in 90 days. If you need help or a new code, please call your [place] clinic or [phone].        Care EveryWhere ID     This is your Care EveryWhere ID. This could be used by other organizations to access your [place] medical records  [id number]         Blood Pressure from Last 3 Encounters:   18 113/83   18 108/62   18 103/78    Weight from Last 3 Encounters:   18 86 kg (189 lb 9.5 oz)   18 89.3 kg (196 lb 13.9 oz)   18 86.2 kg (190 lb)              We Performed the Following     Glucose by meter     INTERROGATION DEVICE EVAL REMOTE, PACER/ICD          Today's Medication Changes      Notice     This visit is during an admission. Changes to the med list made in this visit will be reflected in the After Visit Summary of the admission.             Primary Care Provider Office Phone # Fax #    CLAIRE Rosas [phone]       [address] [address]        Equal Access to Services     [facility] : Hadii emily ku hadasho Soomaali, waaxda luqadaha, qaybta kaalmada adeegyada, willie dorman . So [facility] [phone].    ATENCIÓN: Si habla español, tiene a smith disposición servicios gratuitos de asistencia lingüística. Dayron al [phone].    We comply with applicable federal civil rights laws and [place] laws. We do not discriminate on the basis of race, color, national origin, age, disability, sex, sexual orientation, or gender identity.            Thank you!     Thank you for choosing [facility]  for your care. Our goal is always " to provide you with excellent care. Hearing back from our patients is one way we can continue to improve our services. Please take a few minutes to complete the written survey that you may receive in the mail after your visit with us. Thank you!             Your Updated Medication List - Protect others around you: Learn how to safely use, store and throw away your medicines at www.disposemymeds.org.      Notice     This visit is during an admission. Changes to the med list made in this visit will be reflected in the After Visit Summary of the admission.

## 2018-06-13 NOTE — PROGRESS NOTES
Preliminary Device Interrogation Results.  Final physician signed paceart report to be scanned and attached.    Carelink Express remote ICD transmission received and reviewed.  Device transmission sent per MD orders.  Patient has a Medtronic single lead ICD.  Normal ICD function.  No arrythmias recorded.  Presenting EGM = VS @ 115 bpm.   = 59.5%.  OptiVol fluid index is slightly above the baseline.  Battery voltage = 2.86 V.  No short v-v intervals recorded.  RV lead impedance trends appear stable.  Plan for patient to return to clinic in 3 months as scheduled.    Remote ICD transmission

## 2018-06-13 NOTE — PROCEDURES
Procedure Date: 2018      EEG #:  -2      TYPE OF STUDY:  Video EEG day 2.      DATE OF STUDY:  2018.        SOURCE FILE DURATION:  14 hours and 32 minutes.      PATIENT INFORMATION:  Sohan Rednon is a 68-year-old male who presents with new onset aphasia and altered mental status.  EEG is being done to evaluate for seizures.      MEDICATIONS:  Keppra, melatonin, thiamin, Provigil, Atarax, insulin drip, heparin drip.     TECHNICAL SUMMARY: This video EEG monitoring procedure was performed with 23 scalp electrodes in 10-20 system placements, and additional scalp, precordial and other surface electrodes used for electrical referencing and artifact detection. Video was reviewed intermittently by EEG technologist and physician for electroclinical seizures.      BACKGROUND:  The patient has diffuse generalized theta slowing that is 5-6 Hz in frequency.  In rare instances, we do see up to a 6.5 Hz parieto-occipital rhythm.  The patient did not have well-formed sleep architecture when he appeared to be in restful state and perhaps sleeping at that time.      ACTIVATION PROCEDURES:  No photic stimulation or hyperventilation was done.      EPILEPTIFORM DISCHARGES:  None.      ICTAL:  None.      IMPRESSION:  Video EEG day 2:  Video EEG day 2 is abnormal due to presence of continuous generalized theta slowing and background slowing consistent with a moderate encephalopathy.  No electrographic seizures or epileptiform discharges were seen.      SUMMARY OF 2 DAYS OF VIDEO EEG RECORDING:  Showed patient had continuous generalized theta slowing consistent with a moderate encephalopathy.  No electrographic seizures or epileptiform discharges were seen.         NORMA SALINAS MD             D: 2018   T: 2018   MT: BUFFY      Name:     SOHAN RENDON   MRN:      [id number]        Account:        [id number]   :      1950           Procedure Date: 2018      Document: [id number]

## 2018-06-13 NOTE — PLAN OF CARE
"Problem: Patient Care Overview  Goal: Plan of Care/Patient Progress Review  RN  1. Pt will be comfortable.  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 Admitted on 06/07/18 with \"large MCA stroke\". See flow sheets for vs and assessments. Also see provider notification notes from this evening. Daughter at bedside, assists with cares. Pt requires > every hour nursing interventions. INR 1.54. Tylenol given for comfort per daughter's request. Pt does not speak and only has use of his R arm. INR 1.54. Intermittently restless. Scratching.  I-Heparin infusing at 750 units per hour. 2 mg coumadin given. Tube feedings infusing at 50ml/hr. Although pt has orders for puree diet with nectar thick liquids, daughter wants all medications given down feeding tube. Turned q 1-2 hours. Insulin gtt currently at 10u/hr.  A-Difficult to assess due to stroke, nonverbal.  P-Continue with current poc.      "

## 2018-06-13 NOTE — PROGRESS NOTES
Due to the needs of the Pt.the family asked to reschedule the stroke class for 6/14. They will receive the last part of the education tomorrow at 1:45PM.

## 2018-06-14 NOTE — PLAN OF CARE
Problem: Patient Care Overview  Goal: Plan of Care/Patient Progress Review  RN  1. Pt will be hemodynamically stable.  2. Pt will be free from injury.  3. Labs will be wnl.   Outcome: No Change  D: Stroke 6/7, HMII  I/A:  Heparin gtt supratherapeutic, turned down to 750 units/hr, recheck at 1500.  Na 152, recheck at 1500.  D5 at 50 ml/hr.  Lost PIV access x1, vasc access able to obtain second access, team reluctant to place picc or midline due to POC/pending dc.  WBC trending up- CXR done, UA sent.  UA cloudy, IV abx ordered to start this afternoon.    P:  Continue to monitor, frequent turning and skin care as needed.  Plan to start insulin gtt with TF this evening, SSI and carb coverage in the meantime.

## 2018-06-14 NOTE — PLAN OF CARE
Problem: Patient Care Overview  Goal: Plan of Care/Patient Progress Review  RN  1. Pt will be hemodynamically stable.  2. Pt will be free from injury.  3. Labs will be wnl.   Discharge Planner SLP   Patient plan for discharge: Not stated.  Current status: Pt responding intermittently to son with gestures, but did not open eyes throughout session. Refused all oral presentations. Educated son on diet recommendation and concern for aspiration. Son in agreement with POC. Recommend dysphagia 1 diet and thin liquids when pt is awake and alert, given no new change in neurological status, hold PO at cough, throat clear. SLP to follow per POC.   Barriers to return to prior living situation: Medical stability, dysphagia, aphasia  Recommendations for discharge: Home with   Rationale for recommendations: Pt will benefit from ongoing SLP tx for dysphagia to address oropahryngeal deficits.        Entered by: Anita Gonzalez 06/14/2018 11:04 AM

## 2018-06-14 NOTE — PLAN OF CARE
Problem: Patient Care Overview  Goal: Plan of Care/Patient Progress Review  RN  1. Pt will be hemodynamically stable.  2. Pt will be free from injury.  3. Labs will be wnl.   Outcome: Declining  D: MCA stroke 6/7/18. Hx HM2  I/A: Monitored vitals and assessed pt status. Nonverbal. Use of R arm only. VSS see flowsheet. , 5% dextrose gtt. Heparin gtt 900 units/hr. PRN tylenol. All meds via NG. Cycled TF 6p-8a @ 85 mL/hr. DD1 nectar thick liquids. Minimal PO nutrition. Incontinent of stool and urine. Condom catheter in place. Liquid stools. C-diff returned negative, enteric precautions d/c. Maintain contact precautions. Restless throughout shift. Sleeping infrequently between cares. Lower R PIV d/c.   P: Start sliding scale insulin 6/14.  Calorie counts 6/14-6/16. Daughter at bedside. Continue to monitor and follow POC. Notify Cards 2 with changes.

## 2018-06-14 NOTE — PROGRESS NOTES
CLINICAL NUTRITION SERVICES - REASSESSMENT NOTE     Nutrition Prescription    RECOMMENDATIONS FOR MDs/PROVIDERS TO ORDER:  1. Once kcal counts (6/14-6/16) reveal that pt is consuming at least 1630 kcals and 80 g protein daily on average, discontinue TFs. Notify Endo team if any changes in TFs.  2. Adjust free water flushes via feeding tube as needed, pending fluid status. Currently, free water flushes are minimal, or for patency.    3. Consider appetite stimulant, if appropriate.   4. Rec limit hyperosmolar medications as able vs change route to PO (if/when able) or IV. Dilute suspension medications before administrating. Consider Imodium. If stools are not improving, then consider Nutrisource Fiber, 1 pkt TID initially (each packet provides 3 g soluble fiber).      Malnutrition Status:    Non-severe malnutrition in the context of acute illness/injury on chronic illness     Recommendations already ordered by Registered Dietitian (RD):  Modified oral supplements    Future/Additional Recommendations:  1. Diet advancement as per SLP. Encourage intake of oral supplements.   2. Consider checking vitamin D status. Rec supplement if low. Pt's vitamin D deficiency lab was 18 on 11/17/17.   3. Consider checking folic acid and vitamin B12.   4. Follow WOC nurse notes. If pt is assessed to have a stage I-IV pressure injury (PI), rec the following:         - Continue multivitamin with minerals (change to PO form when able).         - Ten-day wound protocol recommended for stage I PI: Vitamin A (20,000 International Units/day) x 10 days       - Ten-day wound protocol recommended for stage II, III, or IV PI:   Vitamin A (20,000 International Units/day) x 10 days   Vitamin C (500 mg/day) x 10 days   Zinc sulfate (220 mg/day) x 10 days      EVALUATION OF THE PROGRESS TOWARD GOALS   Diet: DD I + nectar-thick liquid diet ordered since 6/12. SLP is following pt. Pt is ordered to receive Magic Cup at 10:00, 14:00, and HS since 6/12.  "  Intake: Poor diet tolerance. Minimal oral intake and pt often not wanting to eat. Per nursing, pt requiring total feeding assistance. First meal was ordered for pt last evening (6/13) which consisted of puree chicken, mashed potatoes, thickened apple juice, yogurt, and mixed berries. Minimal oral intake per nursing. Per chart, \"pt's daughter is concerned that the NJ feeding tube is very irritating and impairs pt's ability to try to swallow foods.\" Suspect diet order (food choices) affecting oral intake as well. Pt was resting and appeared to be confused at time of nutrition visit. Per discussion with pt's daughter, pt had a little oral intake for supper but it is very hard for pt to feed himself (pt needing feeding assistance). Pt generally likes beef, chicken, fish, fruit, and some veggies. Dislikes ice cream and many cold foods but likes warmer foods.     Nutrition Support:   - Feeding Tube (FT) access: NDT was placed in IR 6/7.   - TF regimen: Nepro 85 mL/hr x 14 hours (6p-8a) provides 1190 mL TF, 2142+ kcals , 96+ g protein, 869 mL H2O, 192 g CHO, and 15 g fiber daily.  - TF modulars: ProSource TF modular, 1 pkt TID. Each packet of ProSource TF modular provides 40 kcals and 11 g protein.   - Feeding Tube Flushes: 30 mL Q 4 hrs  - Intake: Pt received a five-day (6/8-6/12) TF average of 471 mL/day. This provided 942 kcals and 40 g protein and does not meet pt's estimated needs below. Pt previously on TwoCal HN at 50 mL/hr starting 6/7. TFs cycled and lower carbohydrate TF formula chosen (also, K+ had trended higher) as per team on 6/13.      NEW FINDINGS   Stools: Pt with 0-3 stools per day this admission until yesterday (6/13). Pt with about six stools yesterday. Received senna 6/8. Pt on several suspension medications. C diff negative on 6/13.     Skin: [facility] nurse consult pending for possible pressure injury.    Wt Hx: 86.3 kg (5/15/17), 84.2 kg (11/26/17), 89.2 kg (5/10/18), 86.8 kg (6/14/18) - Wt variable at " times but this may be due to fluid status.    ASSESSED NUTRITION NEEDS (updated)  Dosing Weight: 86 kg (Based on lowest wt this admission of 86 kg on 6/9)  - Did not use wt of 66.6 kg on 6/8 as suspect this wt is an outlier  Estimated Energy Needs: 6172-6515 kcals/day (25 - 30 kcals/kg)  Justification: Maintenance needs  Estimated Protein Needs: 103-129 grams protein/day (1.2 - 1.5 grams of pro/kg)  Justification: Increased needs with stress factors and cardiac status  Estimated Fluid Needs: 1 mL/kcal/day  Justification: Maintenance or per MD    MALNUTRITION  % Intake: </= 50% for >/= 5 days (severe)  % Weight Loss: Not meeting this criteria.     Subcutaneous Fat Loss: None observed  Muscle Loss: Mild, generalized losses in upper body.  Fluid Accumulation/Edema: None noted per team  Malnutrition Diagnosis: Non-severe malnutrition in the context of acute illness/injury on chronic illness     Previous Goals   Total avg nutritional intake to meet a minimum of 25 kcal/kg and 1.2 g PRO/kg daily (per dosing wt 89 kg).  Evaluation: Not met    Previous Nutrition Diagnosis  Inadequate oral intake related to dysphagia as evidenced by need for enteral nutrition to provide 100% of estimated need at this time.    Evaluation: Unresolved. Updated below.    CURRENT NUTRITION DIAGNOSIS  Inadequate oral intake related to diet order and lack of appetite as evidenced by minimal oral intake per nursing and per pt's daughter.     INTERVENTIONS  Implementation  Nutrition education for nutrition relationship to health/disease: Low-sodium nutrition education not appropriate. Pt on TFs and dysphagia diet (sodium not restricted) with goal to increase oral intake. RD is available for low-sodium nutrition education review in the future, if needed. Discussed goal for increasing oral intake with pt's daughter. Encouraged oral intake of foods pt likes and well-tolerated foods. Also, encouraged intake of oral supplements for added kcals/protein.    Medical food supplement therapy: Discussed oral supplements with pt's daughter. Provided supplement menu. Scheduled vanilla Magic Cup at 10:00, vanilla Boost Glucose Control at 14:00, and peach Boost Breeze at HS. Pt may request additional oral supplements prn.   Collaboration with other providers: Discussed pt with RN. Will not modify form of multivitamin to PO at this time.      Goals  Total average nutrition intake to meet 8572-6381 kcals/day (25 - 30 kcals/kg) and 103-129 grams protein/day (1.2 - 1.5 grams of pro/kg).    Monitoring/Evaluation  Progress toward goals will be monitored and evaluated per protocol.    Nutrition will continue to follow.     Sienna Flynn, MS, RD, LD, [facility]   6C Pgr: [phone]

## 2018-06-14 NOTE — PROVIDER NOTIFICATION
Discussed pt's hypoglycemia orders and insulin orders with cardiology cross cover MD, Dr. Lopes. Insulin gtt dc'd by Dr. Henry Hinson. Starting 5% dextrose for elevated sodium. Per Doris Restrepo, Endocrinology, insulin gtt was to run through the night and novolog and lantus to be initiated tomorrow. Received orders for ss insulin from Dr. Lpoes.

## 2018-06-14 NOTE — PLAN OF CARE
Problem: Patient Care Overview  Goal: Plan of Care/Patient Progress Review  RN  1. Pt will be hemodynamically stable.  2. Pt will be free from injury.  3. Labs will be wnl.   Outcome: Declining  D-HM2 pt, with history of multiple strokes admitted on 06/07/18 with large MCA stroke. Nonverbal. Use of R arm only. Na 157. Tube feedings stopped on previous shift per order. Insulin gtt infusing at 11 units per hour on initial assessment. FS glucose 102.  I-Insulin decreased to 2 units per hour per protocol.  A-Glucose recheck 65.  I-Insulin gtt stopped. Juice given down NJ tube. TF restarted.   A-Glucose recheck 135.  D-Pt taking minimal po nutrition. Multiple incontinent large liquid stools today.  I-5% dextrose initiated for elevated sodium. Obtained order to send stool for c-diff. Family at bedside and updated on the plan.  A-HS glucose 160.  Pt requires > every hour nursing interventions. Pt requires total care with feeding, tube feedings, dressing changes, turning, frequent hygiene needs due to multiple episodes of diarrhea, frequent medications down feeding tube, heparin gtt, frequent glucose checks. Family is at bedside assisting with cares. Family has many requests for pt. This shift pt has required extensive nursing interventions. Pt would benefit from a higher level of care.  Charge nurses have been updated.  P-Continue with current poc. Monitor labs. Keep skin clean and dry.

## 2018-06-14 NOTE — PLAN OF CARE
6/14/18 Stroke Education Note     The following information has been reviewed with the patient and family:     1. Warning signs of stroke  2. Calling 911 if having warning signs of stroke  3. All modifiable risk factors: Hypertension, CAD, atrial fib, diabetes, hypercholesterolemia, smoking, substance abuse, diet, physical inactivity, obesity, sleep apnea.  4. Patient's risk factors for stroke which include: LVAD.Cardiac.Diabetes  5. Follow-up plan for after discharge  6. Medications which include:Very briefly -ASA,Insulin-at least now here in the hospital,Cozaar per [facility] handout.     In addition, the [facility] Stroke Class Handout and Understanding Stroke have been given to the patient and family.     Learner's response to risk factors / lifestyle modification education: Commitment to change  Cardiac:LVAD,medications ,diet  Diabetes:Daughter state they check the patient's blood sugar every AM and that he has not needed insulin before at home.  Family states they arrange and make sure the patient takes his medication as ordered and gets to all appointments.  Family states patient has had a TIA in the past.  Signs/symptoms of a stroke,calling 911,patient's risk factors reviewed today.  Patient up in the chair but dozing most of the time and expressive aphasia.Family attentive.Continue to reinforce information.Family is aware they can request a stroke ed review closer to discharge if they would like.    at today's appointment for the patient and his wife.The patient's two daughters speak and read English.Also see education flow sheet.

## 2018-06-14 NOTE — PROVIDER NOTIFICATION
Insulin gtt was due to hypoglycemia. Pt taking minimal po. Received an order from Dr. Henry Hinson, neurology service to discontinue gtt. Per Doris Restrepo, endocrinology service note, gtt was to continue tonight and SQ insulin, short and long acting would start tomorrow. Paged Dr. Hinson for clarification on insulin orders. Awaiting response.

## 2018-06-14 NOTE — PROGRESS NOTES
WO Nurse Inpatient Wound Assessment   Reason for consultation: Evaluate and treat bilateral groin and perineal wound     Assessment  Perineal and bilateral wound due to Moisture Associated Skin Damage (MASD)  Status: initial assessment    Treatment Plan  Perineal and bilateral groin wound: Every shift Cleanse the area with Jahaira cleanse and protect, very gently with soft cloth. Apply critic aid paste. With repeat application, do not scrub the paste, only remove soiled paste and reapply. If complete removal of paste is necessary use baby oil/mineral oil and soft wash cloth.     Will order pulsate mattress for prevention of further skin breakdown 2/2 loose stools and poor mobility.    Orders Written  Recommended provider order: none  WOC Nurse follow-up plan:signing off  Nursing to notify the Provider(s) and re-consult the WOC Nurse if wound(s) deteriorates or new skin concern.    Patient History  According to provider note(s):  This is a 67 yo M with a history of ICM s/p HM II in 2012 as destination therapy with a course complicated by hemolysis, pump thrombosis and multiple strokes (subcortical, R PICA, R MCA), PFO s/p closure in 2012,MVr with carbomedics ring ICD placement in 2011, latent TB, PFO s/p closure in 2012, CKD presenting with new altered mental status and aphasia CT showing large R MCA encephalomalacia and CTA showing occluded petrous L ICA of unclear chronicity, CT scan 6/7 shows new large MCA stroke. INR on admission was 2    Objective Data  Containment of urine/stool: Incontinence Protocol    Active Diet Order    Active Diet Order      Dysphagia Diet Level 1 Pureed Nectar Thickened Liquids (pre-thickened or use instant food thickener)    Output:   I/O last 3 completed shifts:  In: 1609.18 [I.V.:229.18; NG/GT:300]  Out: 1150 [Urine:1150]    Risk Assessment:   Sensory Perception: 2-->very limited  Moisture: 2-->very moist  Activity: 2-->chairfast  Mobility: 2-->very limited  Nutrition:  3-->adequate  Friction and Shear: 1-->problem  Declan Score: 12                          Labs:   Recent Labs  Lab 06/14/18  0615   HGB 8.0*   INR 1.36*   WBC 14.8*       Physical Exam  Skin inspection: focused perineal and groin skin    Wound Location:  Perineal and groin skin  Wound History: pt having frequent loose stools.  History of fungal infection.  No evidence of fungal infection at this time.    Measurements (length x width x depth, in cm)   Right groin: 0.5 x 0.2 x <0.1 cm  Left groin 1.4 x 0.2 x <0.1 cm  Perineal skin 0.3 x 0.3 x 0.1 cm    Wound Base:  [place] base  Palpation of the wound bed: normal   Periwound skin: intact  Color: normal and consistent with surrounding tissue  Temperature: normal   Drainage:, none  Description of drainage: none  Odor: none  Pain: no grimacing or signs of discomfort    Interventions  Current support surface: Standard  Atmos Air mattress  Current off-loading measures: Pillows under calves  Visual inspection of wound(s) completed  Wound Care: done per plan of care  Supplies: at bedside  Education provided: importance of repositioning and plan of care  Discussed plan of care with Family and Nurse    Vicenta Pagan RN

## 2018-06-14 NOTE — PROGRESS NOTES
Cardiology Heart failure Progress Note    Assessment & Plan   This is a 67 yo M with a history of ICM s/p HM II in 2012 as destination therapy with a course complicated by hemolysis, pump thrombosis and multiple strokes (subcortical, R PICA, R MCA), PFO s/p closure in 2012,MVr with carbomedics ring ICD placement in 2011, latent TB, PFO s/p closure in 2012, CKD presenting with new altered mental status and aphasia CT showing large R MCA encephalomalacia and CTA showing occluded petrous L ICA of unclear chronicity, CT scan 6/7 shows new large MCA stroke. INR on admission was 2     - heart failure 2/2 ICM s/p HM II as DT in 2012  - Stage D NYHA class III  - complicated by hemolysis, suspected pump thrombosis with persistently elevated LDH and strokes admitted with new L ICA stroke.  -PTA cardiac meds are: metoprolol XL 50mg OD, losartan 25mg OD, asa, lasix 20mg PRN-  -  known pump thrombosis and is not a candidate for pump exchange or further therapies- resume coumadin today  - MAP in the 90's SCr at baseline- consider restarting losartan at 12.5mg OD (1/2 of home dose)  - Anticoagulated with coumadin pta (INR goal 1.8-2.3) INR on admission 2.     - new LMCA stroke  - on half dose of home losartan  - PT/OT  - speech therapy- on TF currently, now ok for puree diet and thick nectar  - history of seizures- continue keppra-    Leukocytosis  - UTI- enterobacter- abx re broadened to cefepime today given risk of resistant enterobacter   - PNA- will cover with flagyl for aspiration pna    - Hypernatremia- in the setting of poor PO intake- D5W for 8h to correct Na, will increase free water    Feeding- cycling TF overnights  and pureed diet  DVT prophylaxis- coumadin for LVAD  Dispo- d/c home or TCU in 2-3d  Code-DNR/DNI    Case discussed with Dr. Traci Ayala  Cardiology fellow, PGY-4    Interval History   Afebrile  Hypernatremic improving on D5W  WBC 14   Remains somnolent   Hyperglycemic- cycling  tube feeds    Physical Exam   Temp: 98.3  F (36.8  C) Temp src: Axillary BP: 104/81   Heart Rate: 115 Resp: 24 SpO2: 98 % O2 Device: None (Room air)    Vitals:    06/08/18 0400 06/09/18 0550 06/14/18 0700   Weight: 66.6 kg (146 lb 13.2 oz) 86 kg (189 lb 9.5 oz) 86.8 kg (191 lb 4.8 oz)     Vital Signs with Ranges  Temp:  [97.7  F (36.5  C)-99  F (37.2  C)] 98.3  F (36.8  C)  Heart Rate:  [] 115  Resp:  [14-24] 24  BP: ()/(62-98) 104/81  SpO2:  [98 %-100 %] 98 %  I/O last 3 completed shifts:  In: 1098.25 [I.V.:98.25; NG/GT:150]  Out: 800 [Urine:800]    Heart Rate: 115, Blood pressure 104/81, temperature 98.3  F (36.8  C), temperature source Axillary, resp. rate 24, height 1.829 m (6'), weight 86.8 kg (191 lb 4.8 oz), SpO2 98 %.  191 lbs 4.8 oz  GEN: obtunded,  NAD.  CV:  LVAD humm   LUNGS:  Clear to auscultation bilaterally   ABD:  Active bowel sounds, soft, non-tender/non-distended.  No rebound/guarding/rigidity.  EXT:  No edema or cyanosis.      Medications     IV fluid REPLACEMENT ONLY       IV fluid REPLACEMENT ONLY       HEParin 750 Units/hr (06/14/18 0843)     insulin (regular)       - MEDICATION INSTRUCTIONS -       - MEDICATION INSTRUCTIONS -       Reason ACE/ARB/ARNI order not selected       Reason beta blocker order not selected       Warfarin Therapy Reminder         aspirin  81 mg Oral Daily     ceFEPIme (MAXIPIME) IV  2 g Intravenous Q24H     finasteride  5 mg Oral Daily     insulin aspart  1-9 Units Subcutaneous Q4H     insulin aspart   Subcutaneous TID AC     levETIRAcetam  750 mg Oral or Feeding Tube BID     loratadine  10 mg Oral or Feeding Tube Daily     losartan  12.5 mg Oral Daily     melatonin  10 mg Oral or Feeding Tube At Bedtime     modafinil  100 mg Oral Daily     multivitamins with minerals  15 mL Per Feeding Tube Daily     pantoprazole  20 mg Oral or Feeding Tube QAM AC     protein modular  1 packet Per Feeding Tube TID     thiamine  100 mg Oral or Feeding Tube Daily     warfarin   3 mg Oral ONCE at 18:00       Data     Recent Labs  Lab 06/14/18  0615 06/14/18  0011 06/13/18  2054 06/13/18  1453 06/13/18  0632 06/12/18  0627 06/11/18  1247  06/10/18  0521   WBC 14.8*  --   --  12.7*  --   --  10.8  --  12.1*   HGB 8.0*  --   --  8.5*  --   --  8.2*  --  8.4*   *  --   --  103*  --   --  97  --  96     --   --  221  --   --  181  --  199   INR 1.36*  --   --   --  1.40* 1.54*  --   < > 1.81*   * 154*  --  157*  --   --   --   --  145*   POTASSIUM 4.8  --   --  4.8  --   --   --   --  5.1   CHLORIDE 124*  --  >125* 128*  --   --   --   --  116*   CO2 19*  --   --  20  --   --   --   --  17*   BUN 51*  --   --  56*  --   --   --   --  43*   CR 2.30*  --   --  2.20*  --  2.08*  --   --  1.84*   ANIONGAP 10  --   --  10  --   --   --   --  11   JOSÉ 8.7  --   --  8.5  --   --   --   --  8.1*   *  --   --  165*  --   --   --   --  410*   < > = values in this interval not displayed.    Recent Results (from the past 24 hour(s))   XR Chest Port 1 View    Narrative    EXAM: XR CHEST PORT 1 VW  6/14/2018 10:55 AM     HISTORY:  leukocytosis r/o pna;        COMPARISON: Chest radiograph 6/6/2018    FINDINGS: Single portable AP view of chest. Unchanged cardio  defibrillator device, LVAD, gastric tube. Partially visualized biliary  drain. Stable enlarged cardiac silhouette. No pneumothorax. Left  retrocardiac opacity. Mild pulmonary congestion.      Impression    IMPRESSION:   1. Left retrocardiac opacity, likely infection or atelectasis.  2. Stable large cardiac silhouette and mild pulmonary congestion.    I have personally reviewed the examination and initial interpretation  and I agree with the findings.    KINSEY HATCH MD

## 2018-06-15 NOTE — PROGRESS NOTES
Calorie Counts  Intake recorded for: 6/14 Kcals: 0  Protein: 0g  # Meals Recorded: 1 meal ordered from kitchen, no intake recorded.   # Supplements Recorded: no intake recorded.

## 2018-06-15 NOTE — PLAN OF CARE
Problem: Patient Care Overview  Goal: Plan of Care/Patient Progress Review  RN  1. Pt will be hemodynamically stable.  2. Pt will be free from injury.  3. Labs will be wnl.   Outcome: No Change  Shift summary [id number]    Pt admitted for embolic CVA. Pt has very extensive PMH(see H and P) . Pt currently in paced rhythm , BP stable running low grade fevers. Pt non verbal and currently unable/won't  take anything from mouth. Pt able to move only right arm and will aggressively attempt to push away care givers when working with him. Pt will attempt to pull out tubes and lines if able. Pt's family always present and very actively involved which can be more hindrance than assistance. Pt requires turning every 2 hours ,or more pending incontinence. Pt placed on specialty mattress .Condom catheter in place. Pt receiving NJ cycled TF of nephro at 85 hr from [id number]. Pt also receives Insulin gtt at [id number] for glucose control. Pt currently on alg. Rhythm 3 and infusing 8.2 units hour. Pt has 2 PIV in left arm- D10 and insulin infusing and 2nd PIV heparin gtt infusing 750 units hour.next 10 a in am. Pt receiving IV antibiotics for UTI.  Pt has heart mate 2 currently fixed at 8800, PI and naranjo running in the 5's and flow have been--- or 4's. Pt has harsh productive cough with tan/green thick sputum. Pt sating well on RA. Ethics came today and questioned writer about Peg placement which family is requesting and would require pt to stop anticoagulation for procedure given recent and past CVA. Writer answered questions . Pt requiring  Greater than 4 hours of nursing cares individually awaiting pt to transfer to   When bed available.    Problem: Ventricular Assist Device (Adult)  Goal: Signs and Symptoms of Listed Potential Problems Will be Absent, Minimized or Managed (Ventricular Assist Device)  Signs and symptoms of listed potential problems will be absent, minimized or managed by discharge/transition of care (reference  Ventricular Assist Device (Adult) CPG).   Outcome: No Change   06/14/18 2256   Ventricular Assist Device   Problems Assessed (Ventricular Assist Device) all   Problems Present (VAD) embolism;infection       Problem: Stroke (Ischemic) (Adult)  Goal: Signs and Symptoms of Listed Potential Problems Will be Absent, Minimized or Managed (Stroke)  Signs and symptoms of listed potential problems will be absent, minimized or managed by discharge/transition of care (reference Stroke (Ischemic) (Adult) CPG).   Outcome: No Change   06/14/18 2256   Stroke (Ischemic)   Problems Assessed (Stroke (Ischemic)/TIA) all   Problems Present (Stroke Ischemic/TIA) acute neurologic deterioration;bladder/bowel dysfunction;cognitive impairment;communication impairment;eating/swallowing impairment;muscle tone abnormal;respiratory compromise;situational response;skin breakdown;fever       Problem: Skin Injury Risk (Adult,Obstetrics,Pediatric)  Goal: Identify Related Risk Factors and Signs and Symptoms  Related risk factors and signs and symptoms are identified upon initiation of Human Response Clinical Practice Guideline (CPG).   Outcome: No Change   06/14/18 2256   Skin Injury Risk   Related Risk Factors (Skin Injury Risk) advanced age;cognitive impairment;hospitalization prolonged;mobility impaired;moisture;nutritional deficiencies;tissue perfusion altered

## 2018-06-15 NOTE — PLAN OF CARE
Problem: Patient Care Overview  Goal: Plan of Care/Patient Progress Review  RN  1. Pt will be hemodynamically stable.  2. Pt will be free from injury.  3. Labs will be wnl.   SLP: Session attempted with family and  present. Pt not arousable to voice or with sternal rub. Family reports that pt consistently more alert in PM, expressed that they do not feel  is necessary. Consulted nurse who initiated permission form with  and family. SLP to follow and reattempt as appropriate.

## 2018-06-15 NOTE — PLAN OF CARE
Problem: Patient Care Overview  Goal: Plan of Care/Patient Progress Review  RN  1. Pt will be hemodynamically stable.  2. Pt will be free from injury.  3. Labs will be wnl.   Outcome: No Change  Blood pressure 104/70, temperature 98.4  F (36.9  C), temperature source Axillary, resp. rate 14, height 1.829 m (6'), weight 86.8 kg (191 lb 4.8 oz), SpO2 100 %.    Pt VSS, on RA.  HR tachy.  LVAD intact, dressing done.  No alarms.  Pt lethargic most of the day, opens eyes and moves around with stimulation. Per family will occasionally say something to them.  Only moving R arm.  T/R q 2 hours.  Remains on insulin gtt q 1 hour, on Alg # 4.  TF's started at 1800 per orders.  Heparin gtt at 750 units/hr, 10a in AM.  D50@ 75cc/hr, recheck Na at 2000.  Condom cath currently intact, incontinent x 1.  Family at bedside assisting with cares.  Plan to have PICC line placed tomorrow.  Continue with plan of care.

## 2018-06-15 NOTE — PLAN OF CARE
Problem: Patient Care Overview  Goal: Plan of Care/Patient Progress Review  RN  1. Pt will be hemodynamically stable.  2. Pt will be free from injury.  3. Labs will be wnl.   Discharge Planner SLP   Patient plan for discharge: Not discussed  Current status: Upon PM attempt, pt alert in bed, visually tracking son on L side of room. Pt declined oral cares, unable to follow commands despite max visual and verbal cues. Pt tolerated limited trials of nectar liquid via straw with no overt s/sx aspiration. Cautiously recommend nectar clear liquid diet, via straw, with 1:1 assistance, single sips, pt to be fully upright for all PO intake, hold PO at s/sx aspiration. Concern for pt to receive adequate nutrition/hydration on this diet given intermittent alertness and low endurance for trials. SLP to follow per POC.  Barriers to return to prior living situation: Medical stability, dysphagia   Recommendations for discharge: LTACH  Rationale for recommendations: Pt nonverbal with transient alertness, low endurance for oral trials, inability to follow commands, moderate to severe dysphagia. Will benefit from ongoing SLP services to address these deficits.        Entered by: Anita Gonzalez 06/15/2018 2:31 PM

## 2018-06-15 NOTE — PROGRESS NOTES
Cardiology Heart failure Progress Note    Assessment & Plan   This is a 69 yo M with a history of ICM s/p HM II in 2012 as destination therapy with a course complicated by hemolysis, pump thrombosis and multiple strokes (subcortical, R PICA, R MCA), PFO s/p closure in 2012,MVr with carbomedics ring ICD placement in 2011, latent TB, PFO s/p closure in 2012, CKD presenting with new altered mental status and aphasia CT showing large R MCA encephalomalacia and CTA showing occluded petrous L ICA of unclear chronicity, CT scan 6/7 shows new large MCA stroke. INR on admission was 2     - heart failure 2/2 ICM s/p HM II as DT in 2012  - Stage D NYHA class III  - complicated by hemolysis, suspected pump thrombosis with persistently elevated LDH and strokes admitted with new L ICA stroke.  -PTA cardiac meds are: metoprolol XL 50mg OD, losartan 25mg OD, asa, lasix 20mg PRN-  -  known pump thrombosis and is not a candidate for pump exchange or further therapies- resume coumadin today  - MAP in the 90's SCr at baseline- consider restarting losartan at 12.5mg OD (1/2 of home dose)  - Anticoagulated with coumadin pta (INR goal 1.8-2.3) INR on admission 2. Continue coumadin    - new LMCA stroke  - on half dose of home losartan  - PT/OT  - speech therapy- on TF currently, now ok for puree diet and thick nectar  - history of seizures- continue keppra-    Leukocytosis  - UTI- enterobacter- abx re broadened to cefepime today given risk of resistant enterobacter   - PNA- will cover with flagyl for aspiration pna    - Hypernatremia- in the setting of poor PO intake- D5W for 8h to correct Na    Feeding- cycling TF overnights  and pureed diet  DVT prophylaxis- coumadin for LVAD  Dispo- d/c home or TCU in 2-3d  Code-DNR/DNI    Case discussed with Dr. Desire Ayala  Cardiology fellow, PGY-4    Interval History   Afebrile  Hypernatremic improving on D5W  WBC 18  LA 1.9  On cefepime and flagyl last night more awake ate  something overnight       Physical Exam   Temp: 97.2  F (36.2  C) Temp src: Axillary BP: 95/71   Heart Rate: 105 Resp: 18 SpO2: 100 % O2 Device: None (Room air)    Vitals:    06/08/18 0400 06/09/18 0550 06/14/18 0700   Weight: 66.6 kg (146 lb 13.2 oz) 86 kg (189 lb 9.5 oz) 86.8 kg (191 lb 4.8 oz)     Vital Signs with Ranges  Temp:  [97.2  F (36.2  C)-98.7  F (37.1  C)] 97.2  F (36.2  C)  Heart Rate:  [103-107] 105  Resp:  [15-24] 18  BP: ()/(69-83) 95/71  SpO2:  [98 %-100 %] 100 %  I/O last 3 completed shifts:  In: 1050.56 [I.V.:335.56; NG/GT:120]  Out: 300 [Urine:300]    Heart Rate: 105, Blood pressure 95/71, temperature 97.2  F (36.2  C), temperature source Axillary, resp. rate 18, height 1.829 m (6'), weight 86.8 kg (191 lb 4.8 oz), SpO2 100 %.  191 lbs 4.8 oz  GEN: obtunded,  NAD.  CV:  LVAD humm   LUNGS:  Clear to auscultation bilaterally   ABD:  Active bowel sounds, soft, non-tender/non-distended.  No rebound/guarding/rigidity.  EXT:  No edema or cyanosis.      Medications     IV fluid REPLACEMENT ONLY 10 mL/hr at 06/14/18 2323     IV fluid REPLACEMENT ONLY       D5W 50 mL/hr at 06/15/18 0849     HEParin 750 Units/hr (06/15/18 0850)     insulin (regular) 6 Units/hr (06/15/18 1044)     - MEDICATION INSTRUCTIONS -       - MEDICATION INSTRUCTIONS -       Reason ACE/ARB/ARNI order not selected       Reason beta blocker order not selected       Warfarin Therapy Reminder         aspirin  81 mg Oral Daily     ceFEPIme (MAXIPIME) IV  2 g Intravenous Q24H     finasteride  5 mg Oral Daily     insulin aspart   Subcutaneous TID AC     levETIRAcetam  750 mg Oral or Feeding Tube BID     loratadine  10 mg Oral or Feeding Tube Daily     losartan  12.5 mg Oral Daily     melatonin  10 mg Oral or Feeding Tube At Bedtime     metroNIDAZOLE  500 mg Oral Q8H GUS     modafinil  100 mg Oral Daily     multivitamin, therapeutic with minerals  1 tablet Oral or Feeding Tube Daily     pantoprazole  20 mg Oral or Feeding Tube QA AC      protein modular  1 packet Per Feeding Tube TID     thiamine  100 mg Oral or Feeding Tube Daily       Data     Recent Labs  Lab 06/15/18  0557 06/15/18  0129 06/14/18  1618 06/14/18  0615  06/13/18  2054 06/13/18  1453 06/13/18  0632   WBC 18.9*  --   --  14.8*  --   --  12.7*  --    HGB 9.0*  --   --  8.0*  --   --  8.5*  --    *  --   --  106*  --   --  103*  --      --   --  211  --   --  221  --    INR 1.31*  --   --  1.36*  --   --   --  1.40*   * 150* 150* 152*  < >  --  157*  --    POTASSIUM 5.1  --   --  4.8  --   --  4.8  --    CHLORIDE 122*  --   --  124*  --  >125* 128*  --    CO2 20  --   --  19*  --   --  20  --    BUN 60*  --   --  51*  --   --  56*  --    CR 2.23*  --   --  2.30*  --   --  2.20*  --    ANIONGAP 8  --   --  10  --   --  10  --    JOSÉ 9.0  --   --  8.7  --   --  8.5  --    *  --   --  272*  --   --  165*  --    < > = values in this interval not displayed.    No results found for this or any previous visit (from the past 24 hour(s)).      Faculty Attestation  Spencer Phipps M.D.    I personally saw and examined this patient, reviewed recent laboratories and imaging studies, discussed the case with the housestaff, and conveyed plan to the patient.  I answered all questions from patient and/or family. I agree with the examination, assessment and plan outlined here.      :

## 2018-06-15 NOTE — PROGRESS NOTES
Transfer to 6B  D-pt transferred without incidence to 6B per orders due to need for a higher level of care.  Pt's daughter and son at bedside and have been informed of the rationale for the move. 6B RN was given report by previous RN caring for the patient.   I-monitored, assessed and addressed pt's status and comfort for changes while awaiting transfer.   A-multisystem needs requiring a higher level of nursing care.  P-Transfer to 6B

## 2018-06-15 NOTE — PROGRESS NOTES
Diabetes Consult Daily  Progress Note          Assessment/Plan:   Sohan Rendon is a 68 year old man with type 2 diabetes and ischemic cardiomyopathay s/p LVAD in 2012, destination therapy, with complicated post-VAD course (hemolysis, pump thrombosis, strokes), presenting with AMS and aphasia and found to have new large MCA stroke, experiencing major hyperglycemia related to enteral feeds and acute illness.    High insulin needs with cycled TFs (~96 units) but surprisingly high insulin needs continuing during day with D5 running at 50ml/h and no TFs (4-6 units/h)-- unclear why insulin needs are staying this high. Unclear how long pt will require D5 fluids for hypernatremia.        Plan    -continue IV insulin infusion at this time given unexpected high daytime insulin needs.  -aspart for meals and snacks: will increase from 1unit/12g to 1unit/6g CHO      -pt's family will need teaching on insulin if to discharge to home on cycled enteral feeds         Outpatient diabetes follow up: PCP  Plan discussed with patient's family, bedside RN, and attempted to reach primary team via Ascom but unable to get through.           Interval History:     The last 24 hours progress and nursing notes reviewed.  IV insulin restarted last night around 1800 when TFs restarted.  Insulin rates and glucoses were quite variable overnight (2-12 units/h with BG 120s-200s- mostly 200s).  Total insulin during cycled TF was ~96 units.  D10 ran at 10ml/h overnight.  Around 0840 D5 at 50ml/h was restarted for hypernatremia.  Insulin needs today have stayed surprisingly high: 4-6 units/h with D5 fluids but no TFs.  Pt is on no DM meds at home with A1c <5% so anticipated no basal insulin need during the day.  WBC count continues to rise (18.9 today).  Creatinine is stable at 2.2-2.3.  His intake is minimal-- his daughter was going to try giving him a Boost Breeze late morning but it appears he has not had any yet.  Mr. Rendon  transferred to  last evening due to need for higher level of care.    PTA Regimen: only checking BG qAM, no medications        Recent Labs  Lab 06/15/18  1345 06/15/18  1248 06/15/18  1156 06/15/18  1043 06/15/18  0950 06/15/18  0932  06/15/18  0557  06/14/18  0615  06/13/18  1453  06/10/18  0521 06/09/18  0527   GLC  --   --   --   --   --   --   --  168*  --  272*  --  165*  --  410* 234*   * 123* 139* 162* 199* 219*  < >  --   < >  --   < >  --   < >  --   --    < > = values in this interval not displayed.            Review of Systems:   See interval hx          Medications:       Active Diet Order      Dysphagia Diet Level 1 Pureed Nectar Thickened Liquids (pre-thickened or use instant food thickener)     Physical Exam:  Gen: Alert, resting in bed, in NAD. Daughter is present and supportive.  HEENT: NC/AT, mucous membranes are moist, NJ FT bridled  Resp: Unlabored  Neuro: alert, no verbalization  BP 97/70  Temp 98.2  F (36.8  C) (Axillary)  Resp 19  Ht 1.829 m (6')  Wt 86.8 kg (191 lb 4.8 oz)  SpO2 100%  BMI 25.94 kg/m2           Data:     Lab Results   Component Value Date    A1C 4.7 06/07/2018    A1C 4.8 08/04/2017    A1C 5.2 07/21/2016    A1C 8.0 09/03/2014            Recent Labs   Lab Test  06/15/18   1222  06/15/18   0557   06/14/18   0615   NA  151*  151*   < >  152*   POTASSIUM   --   5.1   --   4.8   CHLORIDE   --   122*   --   124*   CO2   --   20   --   19*   ANIONGAP   --   8   --   10   GLC   --   168*   --   272*   BUN   --   60*   --   51*   CR   --   2.23*   --   2.30*   JOSÉ   --   9.0   --   8.7    < > = values in this interval not displayed.     CBC RESULTS:   Recent Labs   Lab Test  06/15/18   0557   WBC  18.9*   RBC  3.05*   HGB  9.0*   HCT  32.5*   MCV  107*   MCH  29.5   MCHC  27.7*   RDW  26.3*   PLT  209       I spent a total of 35 minutes bedside and on the inpatient unit managing the glycemic care of Sohan Rendon. Over 50% of my time on the unit was spent counseling the  patient's family and/or coordinating care regarding glucose management in context of cycling TFs, D5W for hypernatremia, and unexpected high daytime insulin needs.  See note for details.      Kristen Santana PA-C [phone]    Diabetes Management job code [id number]

## 2018-06-15 NOTE — PLAN OF CARE
Problem: Patient Care Overview  Goal: Plan of Care/Patient Progress Review  RN  1. Pt will be hemodynamically stable.  2. Pt will be free from injury.  3. Labs will be wnl.   Outcome: No Change  Neuro: Non verbal, does not respond to commands.    Cardiac: ST. VSS. LVAD   Respiratory: Sating 100 on RA. Suction prn with lizett   GI/: No s/s nausea. BM x1. Adequate urine output. Condom cath removed via patient.    Diet/appetite: liquid diet with nectar thick. BS carb coverage and insulin drip.   Activity:  Assist of 2. Chairfast  Pain: At acceptable level on current regimen. Tylenol PRN x1  Skin: No new deficits noted.   LDA's: Heart mate II. 2 PIV infusing. Insulin gtt RPIV. Heparin infusing at 750 on remaining line.  Condom Cath removed.     Plan: Daughter at bedside. Continue withy insulin drip. Continue with POC. Notify primary team with changes.    Problem: Cardiac Disease Comorbidity  Goal: Cardiac Disease  Patient comorbidity will be monitored for signs and symptoms of Cardiac Disease.  Problems will be absent, minimized or managed by discharge/transition of care.   Outcome: No Change  LVAD. Continue to monitor. BP stable. ST rhythm.     Problem: Peripheral Vascular/Peripheral Neurovascular Disease Comorbidity  Goal: Peripheral Vascular/Peripheral Neurovascular Disease  Patient comorbidity will be monitored for signs and symptoms of Peripheral Vascular/Peripheral Neurovascular Disease condition.  Problems will be absent, minimized or managed by discharge/transition of care.   Outcome: No Change  Continue to monitor.     Problem: Memory Impairment Comorbidity  Goal: Memory Impairment Comorbidity  Patient comorbidity will be monitored for signs and symptoms of Memory Impairment condition.  Problems will be absent, minimized or managed by discharge/transition of care.   Outcome: No Change  Patient is non verbal and does not respond to commands. Was alert for short periods of time today. When alert, can drink  liquids thick nectar.

## 2018-06-15 NOTE — PLAN OF CARE
"Problem: Patient Care Overview  Goal: Plan of Care/Patient Progress Review  RN  1. Pt will be hemodynamically stable.  2. Pt will be free from injury.  3. Labs will be wnl.   Outcome: No Change  Neuro: Patient is Finnish speaking primarily but has become nonverbal over the course of this admission. Because of this TRACY level of orientation.   Cardiac: Heartmate II LVAD. ST with HR in 100s to 110s. BPs soft 80s-90s/60s-80s. Cards II covering intern notified of BPs. Also noted Heartmate monitor to show \"---\" where a flow reading should display. This is normal for this patient. Seeing \"+++\" would be more concerning for thrombus.   Respiratory: Sating WNL on RA. Requested patient care order for \"spot checking\" O2 as not able to get a good reading due to \"poor perfusion\".   GI/: Incontinent of stool x 1, cleaned by son and daughter (per their request). Condom cath re-placed. Due to void. Had one episode of emesis, gave PRN zofran.   Diet/Appetite: NPO. Cycled TF (6pm - 8am) to NJ at 85cc/hour with standard FWF.   Activity: Bedrest. AST of 2 for turning/repositiong.   Pain: No nonverbal indicators, continue to monitor.   Skin: No new deficits noted.   LDAs: Heartmate II. PIV on R forearm x 2. D10 at 10cc/hour together with insulin gtt on algorithm 4 at 2 units/hour. Insuline gtt to be d/c'd when TF are cycled off. Heparin gtt at 750 units/hour in remaining line.     Continue with POC. Notify primary team with changes.   Elza Santo RN              "

## 2018-06-15 NOTE — PROGRESS NOTES
Transfer  Transferred from:   Via:bed  Reason for transfer:Pt requiring a higher level of care.   Family: Aware of transfer; with patient.   Belongings: Received with pt  Chart: Received with pt  Medications: Were not brought from 6C but retrieved at a later time.   2 RN Skin Assessment Completed By: Elza ESCOBAR and Kobe ESCOBAR   Report received from: Sarah SANCHEZ RN   Pt status: 6B rm 35

## 2018-06-16 NOTE — PLAN OF CARE
Problem: Patient Care Overview  Goal: Plan of Care/Patient Progress Review  RN  1. Pt will be hemodynamically stable.  2. Pt will be free from injury.  3. Labs will be wnl.   Outcome: No Change  Temp: 97  F (36.1  C) Temp src: Axillary BP: (!) 73/64   Heart Rate: 104 Resp: 18 SpO2: 99 % O2 Device: None (Room air)       Neuro: Pt not following commands, very lethargic. Speaks one word at a time, otherwise nonverbal. Unable to complete full neuro exam d/t lethargy and command following deficits. Movement of right arm only.   Cardiac: Sinus tach rates 100s-110s, HM2 LVAD with parameters WDL. BPs soft, MAPs >65.     Respiratory: Sating >95% on RA. Spot checking. Productive cough with white thick sputum.  GI/: Adequate urine output, incontinent, condom cath in place. Abdomen distended, No Bm this shift.  Diet/appetite: Tolerating TF at goal of 85ml/hr, DD1 nectar thick diet. Per daughter, pt tolerated 3 sips nectar thick then began coughing.  Activity:  Assist of 2, turned Q1-2hrs overnight per family request.   Pain: At acceptable level on current regimen. PRN tylenol x1.  Skin: No new deficits noted. Fistula to perineum- cleansed with margie and barrier cream.  LDA's: Right PIV x2; D5 discontinued, heparin gtt at 750/hr, insulin gtt with levels uncontrolled, MD aware, algorithim 4 and 4+ overnight.    Plan: Continue with POC. Notify primary team with changes.    Problem: Cardiac Disease Comorbidity  Goal: Cardiac Disease  Patient comorbidity will be monitored for signs and symptoms of Cardiac Disease.  Problems will be absent, minimized or managed by discharge/transition of care.   Outcome: No Change  LVAD and parameters stable.

## 2018-06-16 NOTE — PROGRESS NOTES
Diabetes Consult Daily  Progress Note          Assessment/Plan:   Sohan Rendon is a 68 year old man with type 2 diabetes and ischemic cardiomyopathay s/p LVAD in 2012, destination therapy, with complicated post-VAD course (hemolysis, pump thrombosis, strokes), presenting with AMS and aphasia and found to have new large MCA stroke, experiencing major hyperglycemia related to enteral feeds and acute illness.    IV insulin rates more steady overnight, with dextrose fluids off around MN-- averaging 7 units/h.  Daytime insulin needs yesterday quite variable with D5 running at 50-75ml/h.        Plan  Orders placed to transition to SC insulin this morning.  -NPH 20 units qAM started this morning, IV insulin stopped 1 hour later  -NPH 88 units qPM at 1800- please give at start of cycled TFs (hold if TFs do not run)  -aspart for meals and snacks increased to 1unit/6g CHO yesterday  -aspart for correction: high intensity at 0800, 1200, 1600; very high intensity with TFs runnin, 0000, 0400  -monitor glucose q4h.      -pt's family will need teaching on insulin if to discharge to home on cycled enteral feeds         Outpatient diabetes follow up: PCP  Plan discussed with patient's family, bedside RN.           Interval History:     The last 24 hours progress and nursing notes reviewed.  D5W ran at 50ml/h for much of the day yesterday, rate increased to 75ml/h around 1700.  During the night IV insulin rates remained high- primary team was contacted to see if dextrose fluids could be discontinued- these were dc'd around MN and free water flushes were increased.  Na level today is down to 147.  Just prior to cycled TFs starting IV insulin rates settled down to 2units/h (D5w rate was 50-75ml/h at that time).    Overnight, insulin rates were 8-9 units/h with TFs AND D5 fluids running. Once D5 stopped at MN, insulin rates decreased to 6-8 units/h (average 7 units/h) with BG in mid to high 100s.  Pt's  daughter is present today.  She reports that he is doing ok today- no specific issues.  WBC down to 15.3, Creatinine slightly improved at 2.14.  Hemoglobin is down to 7.4 today (9.0 yesterday).      Nutrition:  Cycled TFs: Nepro at 85cc/h x 14h (6pm-8am)  Dysphagia diet level 1- minimal po intake.    PTA Regimen: only checking BG qAM, no medications        Recent Labs  Lab 06/16/18  1232 06/16/18  0903 06/16/18  0801 06/16/18  0655 06/16/18  0559 06/16/18  0458 06/16/18  0426  06/15/18  0557  06/14/18  0615  06/13/18  1453  06/10/18  0521   GLC  --   --   --   --   --   --  177*  --  168*  --  272*  --  165*  --  410*   * 156* 173* 196* 162* 183*  --   < >  --   < >  --   < >  --   < >  --    < > = values in this interval not displayed.            Review of Systems:   See interval hx          Medications:       Active Diet Order      Dysphagia Diet Level 1 Pureed Nectar Thickened Liquids (pre-thickened or use instant food thickener)     Physical Exam:  Gen: Alert, sitting up in chair, in NAD. Daughter is present and supportive.  HEENT: NC/AT, mucous membranes are moist, NJ FT bridled  Resp: Unlabored  Neuro: alert, no verbalization  BP 93/75 (BP Location: Left arm)  Temp 98.1  F (36.7  C) (Axillary)  Resp 20  Ht 1.829 m (6')  Wt 86.8 kg (191 lb 5.8 oz)  SpO2 100%  BMI 25.95 kg/m2           Data:     Lab Results   Component Value Date    A1C 4.7 06/07/2018    A1C 4.8 08/04/2017    A1C 5.2 07/21/2016    A1C 8.0 09/03/2014            Recent Labs   Lab Test  06/15/18   1222  06/15/18   0557   06/14/18   0615   NA  151*  151*   < >  152*   POTASSIUM   --   5.1   --   4.8   CHLORIDE   --   122*   --   124*   CO2   --   20   --   19*   ANIONGAP   --   8   --   10   GLC   --   168*   --   272*   BUN   --   60*   --   51*   CR   --   2.23*   --   2.30*   JOSÉ   --   9.0   --   8.7    < > = values in this interval not displayed.     CBC RESULTS:   Recent Labs   Lab Test  06/15/18   0557   WBC  18.9*   RBC  3.05*    HGB  9.0*   HCT  32.5*   MCV  107*   MCH  29.5   MCHC  27.7*   RDW  26.3*   PLT  209       I spent a total of 35 minutes bedside and on the inpatient unit managing the glycemic care of Sohan Rendon. Over 50% of my time on the unit was spent counseling the patient's family and/or coordinating care regarding transition from IV to SC insulin in context of cycled TFs and ongoing high insulin needs.  See note for details.      Kristen Santana PA-C [phone]    Diabetes Management job code [id number]

## 2018-06-16 NOTE — PROVIDER NOTIFICATION
Text paged provider to notify that pt is requiring Algorithm 4+ for insulin gtt. D5 is currently running at 50cc/hr. Inquired if rate should be decreased or fluids should be changed to something without dextrose.    2345: Awaiting reply    0026: Spoke with Cards intern, will discontinue D5 fluids, increase FWF thru NJ to 200 Q4hrs. Continue with algorithm 4+

## 2018-06-16 NOTE — PROGRESS NOTES
Vascular Access Services Notes:    PICC no longer indicated per IGOR Vazquez RN. Orders canceled in EPIC.        KRISTA GoodwinN, RN [facility]

## 2018-06-16 NOTE — PLAN OF CARE
Problem: Patient Care Overview  Goal: Plan of Care/Patient Progress Review  RN  1. Pt will be hemodynamically stable.  2. Pt will be free from injury.  3. Labs will be wnl.   Outcome: No Change  BP 92/75 (BP Location: Left arm)  Temp 98.5  F (36.9  C) (Axillary)  Resp 18  Ht 1.829 m (6')  Wt 86.8 kg (191 lb 5.8 oz)  SpO2 99%  BMI 25.95 kg/m2    Patient very lethargic today, occasionally opens eyes, no attempts to speak noted, not following commands, moves RUE only.  Soft BPs with MAPs in the 70s-80s, no LVAD alarms, driveline site WNL, on room air, afebrile.  Received Tylenol x 1 for restlessness.  Cycled tube feeds started at 1800, free water flushes increased to 250 mL q 4, sodium check scheduled for 2000.  Transitioned to SQ Insulin today, blood glucose q 4, no PO intake.  Heparin gtt at 750 units/hr, 10a recheck scheduled for 2000.  Condom cath in place, good urine output, incontinent of stool.  Up to recliner with lift, tolerated well.  Family at bedside, very attentive to patient, will continue with plan of care.    Problem: Cardiac Disease Comorbidity  Goal: Cardiac Disease  Patient comorbidity will be monitored for signs and symptoms of Cardiac Disease.  Problems will be absent, minimized or managed by discharge/transition of care.   Outcome: No Change  BP stable, no LVAD alarms.

## 2018-06-16 NOTE — PROGRESS NOTES
Cardiology Heart failure Progress Note    Assessment & Plan   This is a 67 yo M with a history of ICM s/p HM II in 2012 as destination therapy with a course complicated by hemolysis, pump thrombosis and multiple strokes (subcortical, R PICA, R MCA), PFO s/p closure in 2012,MVr with carbomedics ring ICD placement in 2011, latent TB, PFO s/p closure in 2012, CKD presenting with new altered mental status and aphasia CT showing large R MCA encephalomalacia and CTA showing occluded petrous L ICA of unclear chronicity, CT scan 6/7 shows new large MCA stroke. INR on admission was 2     - heart failure 2/2 ICM s/p HM II as DT in 2012  - Stage D NYHA class III  - complicated by hemolysis, suspected pump thrombosis with persistently elevated LDH and strokes admitted with new L ICA stroke.  -PTA cardiac meds are: metoprolol XL 50mg OD, losartan 25mg OD, asa, lasix 20mg PRN-  -  known pump thrombosis and is not a candidate for pump exchange or further therapies- resume coumadin today  - MAP in the 90's SCr at baseline- consider restarting losartan at 12.5mg OD (1/2 of home dose)  - Anticoagulated with coumadin pta (INR goal 1.8-2.3) INR on admission 2. Continue coumadin    - new LMCA stroke  - on half dose of home losartan  - PT/OT  - speech therapy- on TF currently, now ok for puree diet and thick nectar  - history of seizures- continue keppra-    Leukocytosis  - UTI- enterobacter- abx re broadened to cefepime  given risk of resistant enterobacter   - PNA-  flagyl for aspiration pna    - Hypernatremia- in the setting of poor PO intake-  - free water flushes   -Na q6h    Feeding- cycling TF overnights  and pureed diet  DVT prophylaxis- coumadin for LVAD  Dispo- d/c home or TCU in 2-3d  Code-DNR/DNI    Case discussed with Dr. Desire Ayala  Cardiology fellow, PGY-4    Interval History   Afebrile  Hypernatremic improving switched to free water flushes through the corpak  WBC 18->14, Hgb 7.5  On cefepime  and flagyl       Physical Exam   Temp: 97.8  F (36.6  C) Temp src: Axillary BP: (!) 84/68   Heart Rate: 105 Resp: 20 SpO2: 100 % O2 Device: None (Room air)    Vitals:    06/09/18 0550 06/14/18 0700 06/16/18 0453   Weight: 86 kg (189 lb 9.5 oz) 86.8 kg (191 lb 4.8 oz) 86.8 kg (191 lb 5.8 oz)     Vital Signs with Ranges  Temp:  [97  F (36.1  C)-98.4  F (36.9  C)] 97.8  F (36.6  C)  Heart Rate:  [] 105  Resp:  [14-20] 20  BP: ()/(32-84) 84/68  SpO2:  [99 %-100 %] 100 %  I/O last 3 completed shifts:  In: 2957.31 [I.V.:997.31; NG/GT:770]  Out: 1125 [Urine:1125]    Heart Rate: 105, Blood pressure (!) 84/68, temperature 97.8  F (36.6  C), temperature source Axillary, resp. rate 20, height 1.829 m (6'), weight 86.8 kg (191 lb 5.8 oz), SpO2 100 %.  191 lbs 5.75 oz  GEN: obtunded,  NAD.  CV:  LVAD humm   LUNGS:  Clear to auscultation bilaterally   ABD:  Active bowel sounds, soft, non-tender/non-distended.  No rebound/guarding/rigidity.  EXT:  No edema or cyanosis.      Medications     IV fluid REPLACEMENT ONLY 10 mL/hr at 06/14/18 2323     IV fluid REPLACEMENT ONLY       HEParin Stopped (06/16/18 0734)     insulin (regular) 6 Units/hr (06/16/18 0805)     - MEDICATION INSTRUCTIONS -       - MEDICATION INSTRUCTIONS -       Reason ACE/ARB/ARNI order not selected       Reason beta blocker order not selected       Warfarin Therapy Reminder         aspirin  81 mg Oral Daily     ceFEPIme (MAXIPIME) IV  2 g Intravenous Q24H     finasteride  5 mg Oral Daily     insulin aspart  1-12 Units Subcutaneous TID     insulin aspart  1-22 Units Subcutaneous TID     insulin aspart   Subcutaneous TID AC     insulin isophane human  20 Units Subcutaneous QAM     insulin isophane human  96 Units Subcutaneous Q24H     levETIRAcetam  750 mg Oral or Feeding Tube BID     loratadine  10 mg Oral or Feeding Tube Daily     losartan  12.5 mg Oral Daily     - MEDICATION INSTRUCTIONS -   Does not apply Once     melatonin  10 mg Oral or Feeding Tube  At Bedtime     metroNIDAZOLE  500 mg Oral Q8H GSU     modafinil  100 mg Oral Daily     multivitamin, therapeutic with minerals  1 tablet Oral or Feeding Tube Daily     pantoprazole  20 mg Oral or Feeding Tube QAM AC     protein modular  1 packet Per Feeding Tube TID     thiamine  100 mg Oral or Feeding Tube Daily       Data     Recent Labs  Lab 06/16/18  0742 06/16/18  0426 06/15/18  2005 06/15/18  1222 06/15/18  0557  06/14/18  0615   WBC 15.3* 15.4*  --   --  18.9*  --  14.8*   HGB 7.4* 7.3*  --   --  9.0*  --  8.0*   * 108*  --   --  107*  --  106*    191  --   --  209  --  211   INR  --  1.37*  --   --  1.31*  --  1.36*   NA  --  147* 147* 151* 151*  < > 152*   POTASSIUM  --  4.5  --   --  5.1  --  4.8   CHLORIDE  --  119*  --   --  122*  --  124*   CO2  --  19*  --   --  20  --  19*   BUN  --  66*  --   --  60*  --  51*   CR  --  2.14*  --   --  2.23*  --  2.30*   ANIONGAP  --  8  --   --  8  --  10   JOSÉ  --  8.6  --   --  9.0  --  8.7   GLC  --  177*  --   --  168*  --  272*   < > = values in this interval not displayed.    No results found for this or any previous visit (from the past 24 hour(s)).      I personally provided care for this patient, reviewed chart, discussed course with patient, housestaff and consulting physicians.  I answered all questions.    Spencer Phipps M.D.  Division of Cardiology  Department of Medicine

## 2018-06-16 NOTE — PLAN OF CARE
Problem: Patient Care Overview  Goal: Plan of Care/Patient Progress Review  RN  1. Pt will be hemodynamically stable.  2. Pt will be free from injury.  3. Labs will be wnl.   SLP: Cx session. Pt somnolent at time of both attempts. Reviewed recommendations with family: sit upright, single bites, single sips, slow pacing, hold PO at cough, family verbalized understanding. SLP to reschedule per POC.

## 2018-06-17 NOTE — PROGRESS NOTES
Diabetes Consult Daily  Progress Note          Assessment/Plan:   Sohan Rendon is a 68 year old man with type 2 diabetes and ischemic cardiomyopathay s/p LVAD in 2012, destination therapy, with complicated post-VAD course (hemolysis, pump thrombosis, strokes), presenting with AMS and aphasia and found to have new large MCA stroke, experiencing major hyperglycemia related to enteral feeds and acute illness.    Glucoses stable in the mid to high 100s since transitioning off IV insulin yesterday morning.          Plan    -NPH 20 units qAM   -NPH 88 units qPM at 1800- please give at start of cycled TFs (hold if TFs do not run)  -aspart for meals and snacks 1unit/6g CHO   -aspart for correction: high intensity at 0800, 1200, 1600; very high intensity with TFs runnin, 0000, 0400  -monitor glucose q4h.      -pt's family will need teaching on insulin if to discharge to home on cycled enteral feeds         Outpatient diabetes follow up: PCP  Plan discussed with patient's daughter.           Interval History:     The last 24 hours progress and nursing notes reviewed.  Transitioned to SC insulin yesterday morning and glucoses stable in mid to high 100s.  Off dextrose fluids during this time (D5 was stopped around MN of ).  Na improved to 144 this morning with increase in free water flushes yesterday.  WBC continues to trend down.  Creatinine also continues to improve: 2.03 today.  Still no intake beyond occasional sip.  His daughter reports that he seems comfortable today.  She notes that when she asks him to do something there is a delay before he does the action.      Nutrition:  Cycled TFs: Nepro at 85cc/h x 14h (6pm-8am)  Dysphagia diet level 1- minimal po intake.    PTA Regimen: only checking BG qAM, no medications        Recent Labs  Lab 18  1208 18  0750 18  0409 18  0317 18  2355 18  1941 18  1613  18  0426  06/15/18  0557   06/14/18  0615  06/13/18  1453   GLC  --   --  168*  --   --   --   --   --  177*  --  168*  --  272*  --  165*   * 145*  --  173* 168* 179* 171*  < >  --   < >  --   < >  --   < >  --    < > = values in this interval not displayed.            Review of Systems:   See interval hx          Medications:       Active Diet Order      Dysphagia Diet Level 1 Pureed Nectar Thickened Liquids (pre-thickened or use instant food thickener)     Physical Exam:  Gen: Alert, sitting up in bed, in NAD. Daughter is present and supportive.  HEENT: NC/AT, mucous membranes are moist, NJ FT bridled  Resp: Unlabored  Neuro: Alert, no verbalization  /78 (BP Location: Right arm)  Temp 97  F (36.1  C) (Axillary)  Resp 12  Ht 1.829 m (6')  Wt 84.4 kg (186 lb 1.1 oz)  SpO2 100%  BMI 25.24 kg/m2           Data:     Lab Results   Component Value Date    A1C 4.7 06/07/2018    A1C 4.8 08/04/2017    A1C 5.2 07/21/2016    A1C 8.0 09/03/2014            Recent Labs   Lab Test  06/17/18   1143  06/17/18   0409   06/16/18   0426   NA  145*  144   < >  147*   POTASSIUM   --   4.0   --   4.5   CHLORIDE   --   113*   --   119*   CO2   --   18*   --   19*   ANIONGAP   --   12   --   8   GLC   --   168*   --   177*   BUN   --   69*   --   66*   CR   --   2.03*   --   2.14*   JOSÉ   --   8.8   --   8.6    < > = values in this interval not displayed.     CBC RESULTS:   Recent Labs   Lab Test  06/17/18   0409   WBC  13.2*   RBC  2.53*   HGB  7.3*   HCT  26.4*   MCV  104*   MCH  28.9   MCHC  27.7*   RDW  27.9*   PLT  207       Kristen Santana PA-C [phone]    Diabetes Management job code [id number]

## 2018-06-17 NOTE — PLAN OF CARE
Problem: Patient Care Overview  Goal: Plan of Care/Patient Progress Review  RN  1. Pt will be hemodynamically stable.  2. Pt will be free from injury.  3. Labs will be wnl.   Outcome: No Change  Temp: 97.4  F (36.3  C) Temp src: Axillary BP: (!) 79/65   Heart Rate: 98 Resp: 23 SpO2: 99 % O2 Device: None (Room air)       Neuro: Pt not following commands, very lethargic. No attempts to speak noted. Unable to complete full neuro exam d/t lethargy and command following deficits. Movement of RUE only. Family at bedside, very attentive.  Cardiac: Sinus rhythm- sinus tach rates 90s-100s, HM2 LVAD with parameters WDL. BPs soft, MAPs >65.                 Respiratory: Sating >95% on RA. Spot checking. Productive cough with white/yellow thick sputum.  GI/: Adequate urine output, incontinent, condom cath in place. Abdomen distended. BM x1  Diet/appetite: Tolerating TF at goal of 85ml/hr, DD1 nectar thick diet.   Activity:  Assist of 2, turned Q1-2hrs overnight per family request.   Pain: At acceptable level on current regimen. PRN tylenol x1.  Skin: No new deficits noted. Fistula to perineum- cleansed with margie and barrier cream.  LDA's: Right PIV x2;  heparin gtt at 750/hr, therapeutic this AM.     Plan: Continue with POC. Notify primary team with changes.       Problem: Cardiac Disease Comorbidity  Goal: Cardiac Disease  Patient comorbidity will be monitored for signs and symptoms of Cardiac Disease.  Problems will be absent, minimized or managed by discharge/transition of care.   Outcome: No Change  LVAD with stable parameters

## 2018-06-17 NOTE — PROGRESS NOTES
Cardiology Heart failure Progress Note    Assessment & Plan   This is a 67 yo M with a history of ICM s/p HM II in 2012 as destination therapy with a course complicated by hemolysis, pump thrombosis and multiple strokes (subcortical, R PICA, R MCA), PFO s/p closure in 2012,MVr with carbomedics ring ICD placement in 2011, latent TB, PFO s/p closure in 2012, CKD presenting with new altered mental status and aphasia CT showing large R MCA encephalomalacia and CTA showing occluded petrous L ICA of unclear chronicity, CT scan 6/7 shows new large MCA stroke. INR on admission was 2     - heart failure 2/2 ICM s/p HM II as DT in 2012  - Stage D NYHA class III  - complicated by hemolysis, suspected pump thrombosis with persistently elevated LDH and strokes admitted with new L ICA stroke.  -PTA cardiac meds are: metoprolol XL 50mg OD, losartan 25mg OD, asa, lasix 20mg PRN-  -  known pump thrombosis and is not a candidate for pump exchange or further therapies- resume coumadin today  - MAP in the 90's SCr at baseline- consider restarting losartan at 12.5mg OD (1/2 of home dose)  - Anticoagulated with coumadin pta (INR goal 1.8-2.3) INR on admission 2. Continue coumadin    - new LMCA stroke  - on half dose of home losartan  - PT/OT  - speech therapy- on TF currently, now ok for puree diet and thick nectar  - history of seizures- continue keppra-    Leukocytosis  - UTI- enterobacter- abx re broadened to cefepime  given risk of resistant enterobacter   - PNA-  flagyl for aspiration pna    - Hypernatremia- in the setting of poor PO intake-  - free water flushes   -Na q6h    Feeding- cycling TF overnights  and pureed diet  DVT prophylaxis- coumadin for LVAD  Dispo- d/c home or TCU in 2-3d  Code-DNR/DNI    Case discussed with Dr. Desire Garg  Cardiology fellow, PGY-4    Interval History   Afebrile  Hypernatremic improving  WBC 18->15->13, Hgb 7.3  On cefepime and flagyl       Physical Exam   Temp: 97.5  F (36.4  C)  Temp src: Axillary BP: 100/77   Heart Rate: 85 Resp: 12 SpO2: 100 % O2 Device: None (Room air)    Vitals:    06/14/18 0700 06/16/18 0453 06/17/18 0553   Weight: 86.8 kg (191 lb 4.8 oz) 86.8 kg (191 lb 5.8 oz) 84.4 kg (186 lb 1.1 oz)     Vital Signs with Ranges  Temp:  [97  F (36.1  C)-98  F (36.7  C)] 97.5  F (36.4  C)  Heart Rate:  [] 85  Resp:  [12-23] 12  BP: ()/(50-78) 100/77  SpO2:  [98 %-100 %] 100 %  I/O last 3 completed shifts:  In: 3113.65 [I.V.:273.65; NG/GT:1650]  Out: 800 [Urine:800]    Heart Rate: 85, Blood pressure 100/77, temperature 97.5  F (36.4  C), temperature source Axillary, resp. rate 12, height 1.829 m (6'), weight 84.4 kg (186 lb 1.1 oz), SpO2 100 %.  186 lbs 1.09 oz  GEN: obtunded,  NAD.  CV:  LVAD humm   LUNGS:  Clear to auscultation bilaterally   ABD:  Active bowel sounds, soft, non-tender/non-distended.  No rebound/guarding/rigidity.  EXT:  No edema or cyanosis.      Medications     IV fluid REPLACEMENT ONLY 10 mL/hr at 06/14/18 2323     IV fluid REPLACEMENT ONLY       HEParin 750 Units/hr (06/17/18 0847)     - MEDICATION INSTRUCTIONS -       - MEDICATION INSTRUCTIONS -       Reason ACE/ARB/ARNI order not selected       Reason beta blocker order not selected       Warfarin Therapy Reminder         aspirin  81 mg Oral Daily     ceFEPIme (MAXIPIME) IV  2 g Intravenous Q24H     finasteride  5 mg Oral Daily     insulin aspart  1-12 Units Subcutaneous TID     insulin aspart  1-22 Units Subcutaneous TID     insulin aspart   Subcutaneous TID AC     insulin isophane human  20 Units Subcutaneous QAM     insulin isophane human  88 Units Subcutaneous Q24H     levETIRAcetam  750 mg Oral or Feeding Tube BID     loratadine  10 mg Oral or Feeding Tube Daily     losartan  12.5 mg Oral Daily     melatonin  10 mg Oral or Feeding Tube At Bedtime     metroNIDAZOLE  500 mg Oral Q8H GUS     modafinil  100 mg Oral Daily     multivitamin, therapeutic with minerals  1 tablet Oral or Feeding Tube Daily      pantoprazole  20 mg Oral or Feeding Tube QAM AC     protein modular  1 packet Per Feeding Tube TID     thiamine  100 mg Oral or Feeding Tube Daily       Data     Recent Labs  Lab 06/17/18  1143 06/17/18  0409 06/16/18  1943 06/16/18  0742 06/16/18  0426  06/15/18  0557   WBC  --  13.2*  --  15.3* 15.4*  --  18.9*   HGB  --  7.3*  --  7.4* 7.3*  --  9.0*   MCV  --  104*  --  106* 108*  --  107*   PLT  --  207  --  181 191  --  209   INR  --  1.33*  --   --  1.37*  --  1.31*   * 144 148*  --  147*  < > 151*   POTASSIUM  --  4.0  --   --  4.5  --  5.1   CHLORIDE  --  113*  --   --  119*  --  122*   CO2  --  18*  --   --  19*  --  20   BUN  --  69*  --   --  66*  --  60*   CR  --  2.03*  --   --  2.14*  --  2.23*   ANIONGAP  --  12  --   --  8  --  8   JOSÉ  --  8.8  --   --  8.6  --  9.0   GLC  --  168*  --   --  177*  --  168*   < > = values in this interval not displayed.    No results found for this or any previous visit (from the past 24 hour(s)).      I personally provided care for this patient, reviewed chart, discussed course with patient, housestaff and consulting physicians.  I answered all questions.    Spencer Phipps M.D.  Division of Cardiology  Department of Medicine

## 2018-06-18 NOTE — PLAN OF CARE
Problem: Patient Care Overview  Goal: Plan of Care/Patient Progress Review  RN  1. Pt will be hemodynamically stable.  2. Pt will be free from injury.  3. Labs will be wnl.   Outcome: No Change  /77 (BP Location: Left arm)  Temp 97.5  F (36.4  C) (Axillary)  Resp 12  Ht 1.829 m (6')  Wt 84.4 kg (186 lb 1.1 oz)  SpO2 100%  BMI 25.24 kg/m2    Lethargic and minimally interactive with family, spoke a few words today, moves RUE only.  VSS, no LVAD alarms this shift, on room air, afebrile, no signs of pain.  Heparin gtt at 750 units/hr.  Cycled tube feeds started at 1800, blood glucose well controlled, no PO intake today, next sodium check at 2000.  Condom cath in place, good urine output, incontinent of stool x 3.  Up to chair with lift, tolerated well.  Family at bedside, attentive to patient, updated on plan of care.    Problem: Cardiac Disease Comorbidity  Goal: Cardiac Disease  Patient comorbidity will be monitored for signs and symptoms of Cardiac Disease.  Problems will be absent, minimized or managed by discharge/transition of care.   Outcome: No Change  VSS, Heart Mate II, no alarms this shift.

## 2018-06-18 NOTE — PLAN OF CARE
Problem: Patient Care Overview  Goal: Plan of Care/Patient Progress Review  RN  1. Pt will be hemodynamically stable.  2. Pt will be free from injury.  3. Labs will be wnl.   Outcome: No Change  Temp: 98  F (36.7  C) Temp src: Axillary BP: 92/59   Heart Rate: 99 Resp: 14 SpO2: 96 % O2 Device: None (Room air)       Neuro: Pt not following commands, very lethargic. No attempts to speak noted. Unable to complete full neuro exam d/t lethargy and command following deficits. Movement of RUE only. Family at bedside, insistent on turning pt [id number], pt with minimal sleep.  Cardiac: Sinus rhythm- sinus tach rates 90s-100s, HM2 LVAD with parameters WDL. BPs soft, MAPs >65.                 Respiratory: Sating >95% on RA. Spot checking. Productive cough with white/yellow blood streaked sputum- sample sent, pt placed on airborne precautions to rule out TB.  GI/: Adequate urine output, incontinent, condom cath in place. Abdomen distended, firm. BM x1  Diet/appetite: Tolerating TF at goal of 85ml/hr, DD1 nectar thick diet.   Activity:  Assist of 2, turned frequently overnight per family request.   Pain: At acceptable level on current regimen. PRN tylenol x1.  Skin: No new deficits noted. Fistula to perineum- cleansed with margie and barrier cream.  LDA's: Right PIV x2;  heparin gtt .      Plan: Continue with POC. Notify primary team with changes.        Problem: Cardiac Disease Comorbidity  Goal: Cardiac Disease  Patient comorbidity will be monitored for signs and symptoms of Cardiac Disease.  Problems will be absent, minimized or managed by discharge/transition of care.   Outcome: No Change  LVAD stable

## 2018-06-18 NOTE — PLAN OF CARE
Problem: Infection, Risk/Actual (Adult)  Goal: Identify Related Risk Factors and Signs and Symptoms  Related risk factors and signs and symptoms are identified upon initiation of Human Response Clinical Practice Guideline (CPG).   Pt opens eyes spontaneously today on and off, but not following commands. Pt restless around 9:30am, tylenol PRN given and helpful. No LVAD alarms. VSS and pt on RA. TF stopped at 0800 per cycled orders, H2O flushes increased 200ml/4hrs for elevated sodium levels, last check 144.  Multiple family members at bedside and very helpful with cares. Pt up in chair with lift. No other acute issues this shift. Will cont to monitor.

## 2018-06-18 NOTE — CONSULTS
Ethics Consult Service  Consult requested by: CAMRON Kennedy, on behalf of cardiology (heart failure)   Reason for consult: Family wants a PEG tube placed if the patient s swallowing does not improve but medical teams are hesitant because there are technical challenges to placement along with considerable risks to the procedure.   Information obtained from: CAMRON Chamorro, Sarah Sánchez RN, and chart.   Decisional capacity: No  Code status: DNR/DNI  Advance directive: No  Background: The patient is a 68 year old [place] man who had a LVAD-DT [place] 4 [place] another state. During that hospitalization, the patient suffered a stroke such that he is able to move only his right arm. This resulted in the need for total care and the patient came to [place] to stay with his daughters who have continued to provide him with excellent care. The patient has recently had at least one stroke. There is a clot in the pump and the pump cannot be replaced.   Currently the patient has an NG tube that can safely be in place for only 14 days (it was placed on 6/7/18). This is a trial to see if the patient s swallowing improves. If it does not, a PEG tube would be the next intervention. The family stated that they will have to do this if there is not adequate improvement. However, the medical teams are concerned about the high risk for more strokes since the patient will have to be off anti-coagulants for some period before and after the procedure. Furthermore, placement of the PEG will be technically challenging if not impossible because of the patient s anatomy.   According to the SLP note of 6/15, the patient is nonverbal with transient alertness, low endurance for oral trials, inability to follow commands, and moderate to severe dysphagia.   The family does not believe in palliative care or hospice so this is not a viable option, even though it would be the best option.    Assessment: The teams  hesitancy to proceed with  PEG tube placement is morally understandable given the risks, technical challenges, and patient s overall poor prognosis. However, given that the PEG would be life-sustaining and the quality of life is not a moral consideration for the family, an attempt should be made to place the tube.  The family must be told of the high risks and that the procedure may not be able to be done at all.   Recommendations:  I understand that the discussions regarding DNR/DNI went very well and that the family accepted the reasoning and agreed. It might be helpful to have the physician who had that discussion also have the discussion about PEG placement if that was not already done. It would be important to acknowledge that the family is doing everything possible to sustain their father s life but that the teams are concerned that PEG tube placement will likely make their father worse, if the procedure can be done at all. If adequate nutrition is not obtainable and the family still insists, placement should be attempted.   If we can be of any further assistance, please call the service pager at [phone]. Thank you for this consult.   Yisel Olsen, PhD, RN  Director, Ethics Consult Service

## 2018-06-18 NOTE — CONSULTS
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    Infectious Problem List includes:    1. VRE growth in urine: it is likely that broad spectrum antibiotics for Enteorbacter selected for VRE, and the significance of bacteruria here is unclear (infection vs colonization). Patient without fever or vital signs concerning for ascending urinary tract infection at this time. In past, his UTI's have presented with symptoms, however, as patient is currently unable to communicate it is difficult to ascertain. Given persistent leukocytosis, UA with pyuria, lack of indwelling catheter, and presence of LVAD, it would be reasonable to treat in this case with a short course for VRE with antibiotics for 5-7 days. Could discontinue antibiotics for Enterobacter as he has received an adequate course at this point.     2. Aspiration pneumonia: Most recent CXR clear and antibiotic course should have provided enough coverage to provide resolution of pneumonia. No need to treat any longer for this currently.    3. History of Latent TB: patient received  9 months of INH treatment in 2012. CXR without evidence of active TB. Rust colored sputum in the setting of therapeutic heparin could be small bleeds. We will follow AFB cultures, however, no further need for subsequent cultures or airborne isolation as clinical picture does not seem consistent with active TB.    RECOMMENDATIONS:  1. Discontinue cefepime and metronidazole  2. Start linezolid, 600mg NG BID, for total of 5-7 days  3. Discontinue airborne precautions and no need for further TB testing  4. Not need for further infectious work up at this time, unless new symptoms or worsening leukocytosis.  5. If patient becomes febrile or with clinical picture concerning for sepsis, would recommend initiating infectious work up including CBC, blood cultures x2, urine culture, sputum culture, CXR, and CRP.    Patient discussed my attending Dr. Vanessa.    Lyly Myers MD  Internal Medicine & Pediatrics PGY1  Pager: [phone]      Attestation:  This patient has been seen and evaluated by me, Delgado Vanessa MD.  I have independently examined and discussed this patient with the resident and agree with the findings and recommendations in this note. I have reviewed the patient's History and today's Medications, Vital Signs, Labs and Imaging.      I have edited this note to reflect my findings.      This is a 69yo man with a h/o ischemic cardiomyopathy and LVAD placement in 2012 as destination therapy, since complicated by hemolysis, pump thrombosis, and multiples strokes that is currently admitted with a new large MCA stroke. He has a history of recurrent UTI, and current hospitalization has been complicated by presumed Enterobacter urinary tract infection manifested by leukocytosis and pyuria. He has generally responded to to antibiotics for this, which he has been receiving for 9 days, but has had persistent leukocytosis. A repeat UA/UC showed persistent pyuria and grew VRE.  It is not clear if this represents UTI or  colonization with selection of VRE given broad spectrum antibiotics. However, given persistent signs, lack of indwelling catheter and comorbidities, we would recommend stopping antibiotics for Enterobacter and aspiration pneumonia (received adequate course for each) and treating for a short course for VRE UTI. Urine isolate was susceptible to linezolid, and this would be be best choice of antibiotic.  Rust-colored sputum noted, but TB seems very unlikely given overall presentation and previous documented treatment for LTBI, we would suggest discontinuing further work-up and airborne precautions.      Delgado Vanessa MD   [facility]  [phone]    ________________________________________________________________    Consult Question: assistance in management of infections (VRE in urine, possible aspiration pneumonia, and history of latent TB now with maria luz colored sputum)    Admission Diagnosis: Aphasia [R47.01]  Cerebrovascular accident (CVA), unspecified mechanism (H) [I63.9]  Stroke determined by clinical assessment (H) [I63.9]         History of Present Illness:     Sohan Rendon is a 67 yo male with PMH of significant for complex cardiac disease including heart failure ICM s/p LVAD placement on coumadin, multiple cerebral infacrts (subcortical, right PICA and right MCA), seizures, hypertension, CKD, latent TB s/p 9 months isoniazid in 2012, 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 LVAD managed by cardiology service. Patient was bedbound from previous MCA stroke prior to this most recent stroke. Neurology was primary team until acute stroke care was completed on 6/12 and patient was subsequently transferred to the cardiology service. Patient has been tube feed via NG and  recently restarted on prueed diet. Patient is currently on heparin drip due to cardiac thrombus with potential plans to place PEG tube.    Patient has been afebrile throughout admission, but had mild leukocytosis to 12.1 with hard to control blood glucoses on 6/10. Urine was collected and patient was given linazolid, cefepime and ceftriaxone. Only cefepime was continued as urine grew enterobacter which was ultimately sensitive to bactrim which patient was switched to on 6/12. Patient was some liquid stools which tested negative for c diff on 6/13. Mild leukocytosis returned and on 6/14 CXR demonstrated left sided retrocardiac opacity and urine cultures grew enterococcus faecium concerning for VRE. Patient was restarted on cefepime for UTI and flagyl for possible aspiration pneumonia. Blood cultures were drawn 6/15 and remain no growth to date at this time. Lactate and procal were within normal limits. Leukocytosis peaked at 18.9 on 6/15.     In the last few days since being on heparin ggt, patient developed maria luz colored sputum and possible had hemoptysis overnight on 6/17. Repeat CXR on with resolution of retrocardiac opacity. As patient has history of latent TB, sputum was collected on 6/18 and sent for AFB testing and placed in airborne precautions. Wound team following (6/14) for perineal and bilateral groin wounds due to moisture associated skin damage. Prior to admission, patient was totally dependent with cares and now with new aphasia without ability to eat much by mouth. Family currently continues to was full court press on medical cares. Daughter at bedside. Feels that her father has been more restless in the last 24 hours and has been having loose stools 3-4 the last 2 days. Thinks his belly has looks rounded.    Recent Infections (last year):  6/28/17 - hematuria and dysuria found to have ESBL E coli in urine - cefdinir -> manfred -> etra -> bactrim for total of 14 days  5/7/18 - ED visit for headache and  bladder pressure, no leukocysotis, UA with LE, culture grew VRE, not treated at that time  5/10/18 - ED visit for headache, confusion, no leukocytosis, urine with VRE treated with linazolid    Current antibiotics include:  Cefepime: 6/10-  Metronidazole: 6/14-    Recent culture results include:  Culture Micro   Date Value Ref Range Status   06/15/2018 No growth after 3 days  Preliminary   06/15/2018 No growth after 3 days  Preliminary   06/14/2018 >100,000 colonies/mL  Enterococcus faecium (VRE)   (A)  Final   06/09/2018 (A)  Final    >100,000 colonies/mL  Enterobacter cloacae complex     05/10/2018 No growth  Final   05/10/2018 >100,000 colonies/mL  Enterococcus faecium (VRE)   (A)  Final   05/10/2018 No growth  Final   05/07/2018 (A)  Final    50,000 to 100,000 colonies/mL  Enterococcus faecium (VRE)     04/11/2018 No growth  Final   04/11/2018 No growth  Final              Review of Systems:   Unable to completed due to patient altered mental status in inability to speak.         Past Medical History:     Past Medical History:   Diagnosis Date     Acute right MCA stroke (H) 6/2013    With L sided hemiparesis      Anemia of chronic disease     Baseline Hb 8-9     BPH (benign prostatic hyperplasia)      CHF (congestive heart failure), NYHA class IV (H) 10/9/2012    s/p HeartMate II.  Was on milrinone and dobutamine prior to LVAD      CKD (chronic kidney disease)     Baseline Cr=     Clostridium difficile colitis 12/2012      Dysphagia     PEG tube placed in 2012.  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  Normal carotid duplex 3/2014.       Esophagitis 12/2012    EGD with esophagitis and multiple douenal ulcers     Fracture of femoral neck, right, closed (H) 2/3/2015    Presumed pathologic as patient is non-weight-bearing and suffered no trauma.  Family declined operative repair during hospital stay 1/23 - 1/30/15.      Gastric and duodenal angiodysplasia with hemorrhage 6/18/2015     GERD (gastroesophageal reflux disease)      Gout      Hematuria      HTN (hypertension)     LDL=59 (3/29/2014)     Hyperlipaemia      Ischemic cardiomyopathy      Mitral regurgitation     s/p MVR with Carbomedics ring      Myocardial infarction 1998    In [facility] without intervention      Nonsenile cataract     BE     PFO (patent foramen ovale)     s/p closure (10/9/2012)     TB lung, latent     s/p 9 months INH in 2012             Past Surgical History:     Past Surgical History:   Procedure Laterality Date     C CABG, VEIN, FIVE  2001     CATARACT IOL, RT/LT Right 11/19/2015     ENDOSCOPIC RETROGRADE CHOLANGIOPANCREATOGRAM N/A 5/9/2017    Procedure: COMBINED ENDOSCOPIC RETROGRADE CHOLANGIOPANCREATOGRAPHY, PLACE TUBE/STENT;  Endoscopic Retrograde Cholangiopancreatogram, Stent (Zimmon Biliary 7fr 12cm) Placement;  Surgeon: Cristo Grove MD;  Location: [facility] OR     ESOPHAGOSCOPY, GASTROSCOPY, DUODENOSCOPY (EGD), COMBINED N/A 6/10/2015    Procedure: COMBINED ESOPHAGOSCOPY, GASTROSCOPY, DUODENOSCOPY (EGD);  Surgeon: Edu Jenkins MD;  Location: [facility]     ESOPHAGOSCOPY, GASTROSCOPY, DUODENOSCOPY (EGD), COMBINED N/A 6/15/2015    Procedure: COMBINED ESOPHAGOSCOPY, GASTROSCOPY, DUODENOSCOPY (EGD);  Surgeon: Yury Bonner MD;  Location: [facility] OR     H INSERT ICD  2/10/2011    And pacemaker.  Not BiV     INSERT VENTRICULAR ASSIST DEVICE LEFT (HEARTMATE II)  10/9/2012     IR GASTRO JEJUNOSTOMY TUBE PLACEMENT       PHACOEMULSIFICATION CLEAR CORNEA WITH STANDARD INTRAOCULAR LENS IMPLANT Right 11/19/2015    Procedure: PHACOEMULSIFICATION CLEAR CORNEA WITH STANDARD INTRAOCULAR LENS IMPLANT;  Surgeon: Violeta Cosme MD;  Location: [facility] OR     REPAIR PATENT FORAMEN OVALE  10/9/2012     REPAIR VALVE MITRAL  2/9/2012    28 mm Carbomedics 2/3 ring             Family History:     Family History   Problem Relation Age of Onset     Family History Negative  No family hx of      Glaucoma No family hx of      Macular Degeneration No family hx of      CANCER No family hx of      no skin cancer            Social History:     Social History   Substance Use Topics     Smoking status: Former Smoker     Smokeless tobacco: Former User     Alcohol use No     History   Sexual Activity     Sexual activity: Not on file            Current Medications (antimicrobials listed in bold):       aspirin  81 mg Oral Daily     ceFEPIme (MAXIPIME) IV  2 g Intravenous Q24H     finasteride  5 mg Oral Daily     insulin aspart  1-12 Units Subcutaneous TID     insulin aspart  1-22 Units Subcutaneous TID     insulin aspart   Subcutaneous TID AC     insulin isophane human  20 Units Subcutaneous QAM     insulin isophane human  88 Units Subcutaneous Q24H     levETIRAcetam  750 mg Oral or Feeding Tube BID     loratadine  10 mg Oral or Feeding Tube Daily     losartan  12.5 mg Oral Daily     melatonin  10 mg Oral or Feeding Tube At Bedtime     metroNIDAZOLE  500 mg Oral Q8H GUS     modafinil  100 mg Oral Daily     multivitamin, therapeutic with minerals  1 tablet Oral or Feeding Tube Daily     pantoprazole  20 mg Oral or Feeding Tube QAM AC     protein modular  1 packet Per Feeding Tube TID     thiamine  100 mg Oral or Feeding Tube Daily            Allergies:     Allergies   Allergen Reactions     Seasonal Allergies             Physical Exam:   Vitals were reviewed  Patient Vitals for the past 24 hrs:   BP Temp Temp src Heart Rate Resp SpO2   06/18/18 0800 104/82 98.2  F (36.8  C) Axillary 97 16 97 %   06/18/18 0350 92/59 98  F (36.7  C) - 99 14 96 %   06/17/18 2320 109/71 97.3  F (36.3  C) Axillary 98 16 99 %   06/17/18 1921 93/82 98  F (36.7  C) Axillary 67 14 97 %   06/17/18 1610 100/77 97.5  F (36.4  C) Axillary 85 12 100 %   06/17/18 1200 101/78 97  F (36.1  C) Axillary 87 12 100 %     Ranges for his vital signs:  Temp:  [97  F (36.1  C)-98.2  F (36.8  C)] 98.2  F (36.8  C)  Heart Rate:  [67-99]  97  Resp:  [12-16] 16  BP: ()/(59-82) 104/82  SpO2:  [96 %-100 %] 97 %    Physical Examination:  GENERAL:  well-developed, well-nourished, eyes closed, restless, lying in bed.   HEAD: normocephalic, atraumatic   EYES:  Eyes have anicteric sclerae without conjunctival injection.    ENT:  Nares patent without drainage, NG tube in left nares. Oropharynx is moist without exudates or ulcers. Tongue is midline  NECK:  Supple. No cervical lymphadenopathy  LUNGS:  Clear to auscultation bilateral. Easy respiratory effort. Dry cough intermittently.    CARDIOVASCULAR:  Mechanical hum of LVAD. Mild edema.  ABDOMEN:  Increased bowel sounds, soft, nontender, rounded in appearance. No appreciable hepatosplenomegaly  : condom cath in place, no skin breakdown in groin.  SKIN:  No acute rashes.  Line(s) are in place (2x PIV right arm) without any surrounding erythema or exudate. No stigmata of endocarditis. Extremities are warm and dry. No evidence of bruising or bleeding.   NEUROLOGIC:  Patient not following commands, tired appearing, does not respond to questions, moving RUE         Laboratory Data:     Inflammatory Markers    Recent Labs   Lab Test  06/13/18   1332  08/09/17   0903  05/06/17   0731  05/05/17   0621  05/03/17   0350  05/02/17   2247  04/21/16   0720  01/16/16   0115  06/07/15   1742  01/23/15   0153  11/18/14   1440  10/11/14   1821   05/27/14   1304   SED   --    --    --    --   12   --    --   6  28*   --   8  8   --   9   CRP  39.2  58.5*  220.0*  190.0*   --   91.0*  14.0*   --    --   100.0*  15.7*  7.8   < >   --     < > = values in this interval not displayed.       Hematology Studies    Recent Labs   Lab Test  06/18/18   0500  06/17/18   2347  06/17/18   0409  06/16/18   0742  06/16/18   0426  06/15/18   0557  06/14/18   0615   06/08/18   1923  06/06/18   2115   05/31/18   0432  05/24/18   1618  05/11/18   0551  05/10/18   1045   WBC  13.3*   --   13.2*  15.3*  15.4*  18.9*  14.8*   < >  10.2  8.4    < >  7.4  14.4*  6.6  7.4   ANEU   --    --    --    --    --    --    --    --   7.9  5.7   --   4.6  9.4*  4.1  5.1   AEOS   --    --    --    --    --    --    --    --   0.5  0.6   --   0.7  0.6  0.6  0.6   HGB  7.5*  7.6*  7.3*  7.4*  7.3*  9.0*  8.0*   < >  8.5*  8.5*   < >  8.2*  9.0*  9.1*  9.8*   MCV  105*   --   104*  106*  108*  107*  106*   < >  95  95   < >  91  94  88  91   PLT  231   --   207  181  191  209  211   < >  188  219   < >  251  196  200  210    < > = values in this interval not displayed.       Immune Globulin Studies  No lab results found.    Metabolic Studies     Recent Labs   Lab Test  06/18/18   0500  06/17/18 2013 06/17/18   1143  06/17/18   0409  06/16/18   1943  06/16/18   0426   06/15/18   0557   06/14/18   0615   NA  145*  145*  145*  144  148*  147*   < >  151*   < >  152*   POTASSIUM  3.9   --    --   4.0   --   4.5   --   5.1   --   4.8   CHLORIDE  116*   --    --   113*   --   119*   --   122*   --   124*   CO2  17*   --    --   18*   --   19*   --   20   --   19*   BUN  69*   --    --   69*   --   66*   --   60*   --   51*   CR  1.93*   --    --   2.03*   --   2.14*   --   2.23*   --   2.30*   GFRESTIMATED  35*   --    --   33*   --   31*   --   30*   --   28*    < > = values in this interval not displayed.       Hepatic Studies    Recent Labs   Lab Test  06/06/18   2115  06/02/18   1214  05/31/18   0432  05/10/18   1045  05/04/18   1322  05/02/18   0049   BILITOTAL  0.7  0.6  0.6  0.5  0.6  0.9   ALKPHOS  117  102  105  95  93  93   ALBUMIN  3.2*  3.0*  3.1*  3.1*  3.2*  2.9*   AST  50*  64*  Unsatisfactory specimen - hemolyzed  43  59*  148*   ALT  19  20  23  16  16  24       Thyroid Studies    Recent Labs   Lab Test  08/10/15   2155  01/28/15   0628   TSH  1.32  0.89       Microbiology:  Culture Micro   Date Value Ref Range Status   06/15/2018 No growth after 3 days  Preliminary   06/15/2018 No growth after 3 days  Preliminary   06/14/2018 >100,000  colonies/mL  Enterococcus faecium (VRE)   (A)  Final   06/09/2018 (A)  Final    >100,000 colonies/mL  Enterobacter cloacae complex     05/10/2018 No growth  Final   05/10/2018 >100,000 colonies/mL  Enterococcus faecium (VRE)   (A)  Final   05/10/2018 No growth  Final   05/07/2018 (A)  Final    50,000 to 100,000 colonies/mL  Enterococcus faecium (VRE)     04/11/2018 No growth  Final   04/11/2018 No growth  Final   03/18/2018 No growth  Final   12/09/2017   Final    50,000 to 100,000 colonies/mL  urogenital rashid  Susceptibility testing not routinely done     11/22/2017   Final    Canceled, Test credited  Cancelled by lab - Specimen never received.     11/22/2017   Final    Canceled, Test credited  Cancelled by lab - Specimen never received.     11/22/2017 No growth  Final   06/30/2017 No growth  Final   06/30/2017 No growth  Final   06/30/2017 No growth  Final   06/28/2017 (A)  Final    50,000 to 100,000 colonies/mL Escherichia coli ESBL ESBL (extended beta   lactamase) producing organisms require contact precautions.  Critical Value/Significant Value called to and read back by Dhaval Ventura RN [facility]   2319 06.29.17 CF     05/06/2017 No growth  Final   05/06/2017 No growth  Final   05/06/2017   Final    <10,000 colonies/mL urogenital rashid Susceptibility testing not routinely done   05/03/2017 Heavy growth Normal rashid  Final   05/03/2017 No growth  Final   05/03/2017 No growth  Final   02/12/2017 (A)  Final    Canceled, Test credited >10 Squamous epithelial cells/low power field indicates   oral contamination. Please recollect. Unsatisfactory specimen  Notification of test cancellation was given to Tomer Dill RN at 1801 on 02.12.17   by mp     10/07/2016 (A)  Final    >100,000 colonies/mL Coagulase negative Staphylococcus   10/07/2016 No growth  Final   10/05/2016 (A)  Final    >100,000 colonies/mL Coagulase negative Staphylococcus   09/16/2016 No growth  Final   07/21/2016 >100,000 colonies/mL Enterococcus species (A)   Final   06/30/2016 >100,000 colonies/mL Enterococcus species (A)  Final   06/17/2016 10,000 to 50,000 colonies/mL Enterococcus species (A)  Final   06/16/2016   Final    <10,000 colonies/mL urogenital rashid Susceptibility testing not routinely done   05/12/2016 No growth  Final   04/21/2016 No growth  Final   04/21/2016 No growth  Final   04/09/2016 No growth  Final   08/10/2015 No growth  Final   08/10/2015 No growth  Final   04/20/2015 (A)  Final    >100,000 colonies/mL Coagulase negative Staphylococcus   03/15/2015 >100,000 colonies/mL Enterococcus species (A)  Final   01/21/2015 (A)  Final    >100,000 colonies/mL Alpha hemolytic Streptococcus  Susceptibility testing not routinely done     01/21/2015 No growth  Final   01/21/2015 No growth  Final   09/24/2014   Final    10,000 to 50,000 colonies/mL mixed urogenital rashid  Susceptibility testing not routinely done     08/30/2014   Final    <10,000 colonies/mL urogenital rashid  Susceptibility testing not routinely done     08/10/2014 No growth  Final   07/19/2014   Final    <10,000 colonies/mL urogenital rashid  Susceptibility testing not routinely done     04/19/2014 (A)  Final    Normal skin rashid  Light growth Klebsiella pneumoniae     04/19/2014 No growth  Final   04/15/2014   Final    No Salmonella, Shigella, Campylobacter, E. coli O157, Aeromonas, or Plesiomonas   isolated.         Urine Studies    Recent Labs   Lab Test  06/14/18   0920  06/09/18   2043  06/06/18   2255  05/31/18   0452  05/10/18   1229   LEUKEST  Large*  Large*  Small*  Moderate*  Moderate*   WBCU  >182*  >182*  1  4  7*       Vancomycin Levels  No lab results found.    Invalid input(s): VANCO    Serostatus:  No results found for: CMVG, CMIGG, CMIG, CMIM, CMVIGM, CMVM, CMLTX, HSVG1, HSVG2, HSVTP1, [id number], HS12M, HS12GR, HS1GR, HS2GR, HERPES, HSIM, HSIG, HSIGR, HSVIGMAB, HSVG1, VARICZOSAB, EBVIGG, EBIGG, EBVAGN, [id number]  No results found for: EBIG2, EBIGM, TOXG  No lab results  found.    Invalid input(s): HSV12, VZVG, [id number]    Toxoplasma Testing  No lab results found.    Invalid input(s): TOXPL, TOXABM, TOXPCR    Virology:  CMV viral loads  No lab results found.  No results found for: CMQNT, CMVQ  EBV viral loads   No lab results found.  No results found for: EBVDN, EBRES, EBVDN, EBVSP, EBVPC, EBVPCR  BK viral loads No lab results found.    Invalid input(s): BKDN, BKVPC, BKVPCR  HSV testing  No lab results found.    Hepatitis B Testing   Recent Labs   Lab Test  08/14/14   1133  06/05/14   1319   HBCAB  Positive*  Positive*   HEPBANG  Negative  Negative   HBCM  Negative   --      Hepatitis C Testing     Hepatitis C Antibody   Date Value Ref Range Status   06/05/2014 Negative NEG Final     Respiratory Virus Testing    No results found for: RS, FLUAG

## 2018-06-18 NOTE — PLAN OF CARE
Problem: Patient Care Overview  Goal: Plan of Care/Patient Progress Review  RN  1. Pt will be hemodynamically stable.  2. Pt will be free from injury.  3. Labs will be wnl.   Discharge Planner SLP   Patient plan for discharge: Not able to state  Current status: Pt more easily aroused this date, demonstrating coordinated oropharyngeal swallow, improved ability to clear airway, tolerating >20 PO trials this session. Recommend dysphagia 1 diet and thin liquids, when fully alert, HOB fully upright, via spoon ONLY, pace presentations, hold PO at cough. Family demonstrating understanding of strategies via teach back. SLP to follow per POC.  Barriers to return to prior living situation: Medical stability, dysphagia  Recommendations for discharge: TCU or Home with   Rationale for recommendations: Pt progressing in coordination of oropharyngeal swallow mechanism, tolerance for tx, still below baseline swallow function.        Entered by: Anita Gonzalez 06/18/2018 12:25 PM

## 2018-06-18 NOTE — PROGRESS NOTES
Diabetes Consult Daily  Progress Note          Assessment/Plan:   Sohan Rendon is a 68 year old man with type 2 diabetes and ischemic cardiomyopathay s/p LVAD in 2012, destination therapy, with complicated post-VAD course (hemolysis, pump thrombosis, strokes), presenting with AMS and aphasia and found to have new large MCA stroke, experiencing major hyperglycemia related to enteral feeds and acute illness.    Glucoses stable in desirable range for pt.        Plan    -NPH 20 units qAM   -NPH 88 units qPM at 1800- please give at start of cycled TFs (hold if TFs do not run)  -aspart for meals and snacks 1unit/6g CHO   -aspart for correction: high intensity at 0800, 1200, 1600; very high intensity with TFs runnin, 0000, 0400  -monitor glucose q4h.      -pt's family will need teaching on insulin if to discharge to home on cycled enteral feeds (educ not currently ordered, pls order when he is at TCU)         Outpatient diabetes follow up: PCP  Plan discussed with patient's daughter, RN, primary team.  Diabetes team will sign off from daily visits.  Please page if pt's needs change and we'll be happy to re-visit.  Job code [id number].           Interval History:     The last 24 hours progress and nursing notes reviewed.  TFs cycled at night continue.  Only taking sips/bits of oral.  Dghtr reports pt awake all night having diarrhea.  He is quite tired today.  Yesterday, was more alert and she said he complained he's bored with the children talking to him all the time.    Plan is still for TCU.  Longer-range nutritional goal would be to get him off TFs.  Dghtr asks if diabetes team follows at TCU.  Yes, we will re-visit if needs change.  Creatinine improved today. Na++ about stable from yesterday, off D5W since midnight .    Nutrition:  Cycled TFs: Nepro at 85cc/h x 14h (6pm-8am)  Dysphagia diet level 1- minimal po intake.    PTA Regimen: only checking BG qAM, no medications        Recent  Labs  Lab 06/18/18  0906 06/18/18  0500 06/18/18  0357 06/17/18  2329 06/17/18  1926 06/17/18  1614 06/17/18  1208  06/17/18  0409  06/16/18  0426  06/15/18  0557  06/14/18  0615  06/13/18  1453   GLC  --  174*  --   --   --   --   --   --  168*  --  177*  --  168*  --  272*  --  165*   *  --  184* 131* 164* 124* 136*  < >  --   < >  --   < >  --   < >  --   < >  --    < > = values in this interval not displayed.            Review of Systems:   See interval hx          Medications:       Active Diet Order      Dysphagia Diet Level 1 Pureed Nectar Thickened Liquids (pre-thickened or use instant food thickener)     Physical Exam:  Gen: Dozing in bed, Daughter is present and supportive.  HEENT: NC/AT, mucous membranes are moist, NJ FT bridled  Resp: Unlabored  Neuro: not arousing to verbal, but daughter reports pt had no sleep last night  /82 (BP Location: Left arm)  Temp 98.2  F (36.8  C) (Axillary)  Resp 16  Ht 1.829 m (6')  Wt 84.4 kg (186 lb 1.1 oz)  SpO2 97%  BMI 25.24 kg/m2           Data:     Lab Results   Component Value Date    A1C 4.7 06/07/2018    A1C 4.8 08/04/2017    A1C 5.2 07/21/2016    A1C 8.0 09/03/2014            Recent Labs   Lab Test  06/18/18   0500  06/17/18 2013 06/17/18   0409   NA  145*  145*   < >  144   POTASSIUM  3.9   --    --   4.0   CHLORIDE  116*   --    --   113*   CO2  17*   --    --   18*   ANIONGAP  12   --    --   12   GLC  174*   --    --   168*   BUN  69*   --    --   69*   CR  1.93*   --    --   2.03*   JOSÉ  8.8   --    --   8.8    < > = values in this interval not displayed.     CBC RESULTS:   Recent Labs   Lab Test  06/18/18   0500   WBC  13.3*   RBC  2.51*   HGB  7.5*   HCT  26.4*   MCV  105*   MCH  29.9   MCHC  28.4*   RDW  28.0*   PLT  231     Doris Restrepo APRN [facility] [phone]      Diabetes Management job code [id number]

## 2018-06-18 NOTE — PROGRESS NOTES
Cardiology Heart failure Progress Note    Assessment & Plan   This is a 67 yo M with a history of ICM s/p HM II in 2012 as destination therapy with a course complicated by hemolysis, pump thrombosis and multiple strokes (subcortical, R PICA, R MCA), PFO s/p closure in 2012,MVr with carbomedics ring ICD placement in 2011, latent TB, PFO s/p closure in 2012, CKD presenting with new altered mental status and aphasia CT showing large R MCA encephalomalacia and CTA showing occluded petrous L ICA of unclear chronicity, CT scan 6/7 shows new large MCA stroke. INR on admission was 2     Plan for today  - ID consult re VRE in urine- colonizer vs active infection- d/c cef and flagyl start linezolid 5d course  -hold heparin ggt - coumadin PO in the setting of pump thrombosis and recent stroke  - continue NPH insulin  - speech therapy   - monitor hgb transfuse if <7    - heart failure 2/2 ICM s/p HM II as DT in 2012  - Stage D NYHA class III  - complicated by hemolysis, suspected pump thrombosis with persistently elevated LDH and strokes admitted with new L ICA stroke.  -PTA cardiac meds are: metoprolol XL 50mg OD, losartan 25mg OD, asa, lasix 20mg PRN-  -  known pump thrombosis and is not a candidate for pump exchange or further therapies- resume coumadin today  - MAP in the 90's SCr at baseline- consider restarting losartan at 12.5mg OD (1/2 of home dose)  - Anticoagulated with coumadin pta (INR goal 1.8-2.3) INR on admission 2. Continue coumadin    - new LMCA stroke  - on half dose of home losartan  - PT/OT  - speech therapy- on TF currently, now ok for puree diet and thick nectar  - history of seizures- continue keppra-    Leukocytosis  - UTI vs PNA- on flagyl and cefepime   Ucx- VRE which he has had in the past- ID consulted appreciate their input    - Hypernatremia- in the setting of poor PO intake-  - free water flushes   -Na q6h    Feeding- cycling TF overnights  and pureed diet  DVT prophylaxis- coumadin for  LVAD  Dispo- d/c home or TCU in 2-3d  Code-DNR/DNI    Case discussed with Dr. Desire Ayala  Cardiology fellow, PGY-4    Interval History   Afebrile  Had some bloody sputum last night-AFB was ordered then in the am some questionable melenotic stool? hgb and hemodynamically stable      Physical Exam   Temp: 98.2  F (36.8  C) Temp src: Axillary BP: 104/82   Heart Rate: 97 Resp: 16 SpO2: 97 % O2 Device: None (Room air)    Vitals:    06/14/18 0700 06/16/18 0453 06/17/18 0553   Weight: 86.8 kg (191 lb 4.8 oz) 86.8 kg (191 lb 5.8 oz) 84.4 kg (186 lb 1.1 oz)     Vital Signs with Ranges  Temp:  [97  F (36.1  C)-98.2  F (36.8  C)] 98.2  F (36.8  C)  Heart Rate:  [67-99] 97  Resp:  [12-16] 16  BP: ()/(59-82) 104/82  SpO2:  [96 %-100 %] 97 %  I/O last 3 completed shifts:  In: 2220 [I.V.:280; NG/GT:750]  Out: 1175 [Urine:1175]    Heart Rate: 97, Blood pressure 104/82, temperature 98.2  F (36.8  C), temperature source Axillary, resp. rate 16, height 1.829 m (6'), weight 84.4 kg (186 lb 1.1 oz), SpO2 97 %.  186 lbs 1.09 oz  GEN: obtunded,  NAD.  CV:  LVAD humm   LUNGS:  Mild ronchi b/l  ABD:  Active bowel sounds, soft, non-tender/non-distended.  No rebound/guarding/rigidity.  EXT:  No edema or cyanosis.      Medications     IV fluid REPLACEMENT ONLY 10 mL/hr at 06/14/18 2323     IV fluid REPLACEMENT ONLY       - MEDICATION INSTRUCTIONS -       - MEDICATION INSTRUCTIONS -       Reason ACE/ARB/ARNI order not selected       Reason beta blocker order not selected       Warfarin Therapy Reminder         aspirin  81 mg Oral Daily     ceFEPIme (MAXIPIME) IV  2 g Intravenous Q24H     finasteride  5 mg Oral Daily     insulin aspart  1-12 Units Subcutaneous TID     insulin aspart  1-22 Units Subcutaneous TID     insulin aspart   Subcutaneous TID AC     insulin isophane human  20 Units Subcutaneous QAM     insulin isophane human  88 Units Subcutaneous Q24H     levETIRAcetam  750 mg Oral or Feeding Tube BID      loratadine  10 mg Oral or Feeding Tube Daily     losartan  12.5 mg Oral Daily     melatonin  10 mg Oral or Feeding Tube At Bedtime     metroNIDAZOLE  500 mg Oral Q8H GUS     modafinil  100 mg Oral Daily     multivitamin, therapeutic with minerals  1 tablet Oral or Feeding Tube Daily     pantoprazole  20 mg Oral or Feeding Tube QAM AC     protein modular  1 packet Per Feeding Tube TID     thiamine  100 mg Oral or Feeding Tube Daily       Data     Recent Labs  Lab 06/18/18  0500 06/17/18  2347 06/17/18 2013 06/17/18  1143 06/17/18  0409  06/16/18  0742 06/16/18  0426   WBC 13.3*  --   --   --  13.2*  --  15.3* 15.4*   HGB 7.5* 7.6*  --   --  7.3*  --  7.4* 7.3*   *  --   --   --  104*  --  106* 108*     --   --   --  207  --  181 191   INR 1.42*  --   --   --  1.33*  --   --  1.37*   *  --  145* 145* 144  < >  --  147*   POTASSIUM 3.9  --   --   --  4.0  --   --  4.5   CHLORIDE 116*  --   --   --  113*  --   --  119*   CO2 17*  --   --   --  18*  --   --  19*   BUN 69*  --   --   --  69*  --   --  66*   CR 1.93*  --   --   --  2.03*  --   --  2.14*   ANIONGAP 12  --   --   --  12  --   --  8   JOSÉ 8.8  --   --   --  8.8  --   --  8.6   *  --   --   --  168*  --   --  177*   < > = values in this interval not displayed.    Recent Results (from the past 24 hour(s))   XR Chest Port 1 View    Narrative    Exam:  XR CHEST PORT 1 VW, 6/17/2018 10:13 PM    History: hemoptysis;     Comparison:  6/14/2018.    Findings:  Single AP view of the chest. Left chest wall cardiac device  lead projects over the right ventricle. Enteric tube projects off the  field-of-view below the diaphragm. LVAD projects at the cardiac apex.  Median sternotomy wires and mediastinal surgical clips. Stable  cardiomegaly. Low lung volumes. No pleural effusion or pneumothorax.  Chronic left retrocardiac atelectasis. No new airspace opacities.  Visualized upper abdomen and bones are unremarkable.      Impression    Impression:     1. No acute airspace disease.  2. Stable cardiomegaly and left basilar atelectasis.  3. Stable support devices.    I have personally reviewed the examination and initial interpretation  and I agree with the findings.    JENNIFER KUNZ MD   I personally provided care for this patient, reviewed chart, discussed course with patient, housestaff and consulting physicians.  I answered all questions.    Spencer Phipps M.D.  Division of Cardiology  Department of Medicine

## 2018-06-19 NOTE — PLAN OF CARE
Problem: Patient Care Overview  Goal: Plan of Care/Patient Progress Review  RN  1. Pt will be hemodynamically stable.  2. Pt will be free from injury.  3. Labs will be wnl.   Outcome: No Change  BP 94/76 (BP Location: Left arm)  Temp 97  F (36.1  C) (Axillary)  Resp 16  Ht 1.829 m (6')  Wt 84.4 kg (186 lb 1.1 oz)  SpO2 100%  BMI 25.24 kg/m2  Neuro: Lethargic, unable to answer orientation questions. Unable to follow commands. Neuros unchanged.   Cardiac: SR, HM2 without alarms. VSS.       Respiratory: Sating >92% on RA.  GI/: Adequate urine output per condom cath. BM X1  Diet/appetite: Tolerating TF at goal of 85 ml/hr. 50 ml flush q1hr.  Activity:  Assist of 2, turned q2hrs.  Pain: Tylenol given x1 while appearing restless.    Skin: Intact, no new deficits noted.    R: Continue with POC. Notify primary team with changes.        Problem: Cardiac Disease Comorbidity  Goal: Cardiac Disease  Patient comorbidity will be monitored for signs and symptoms of Cardiac Disease.  Problems will be absent, minimized or managed by discharge/transition of care.   Outcome: No Change  HM2 LVAD in place. Continuous telemetry.

## 2018-06-19 NOTE — PROGRESS NOTES
Care Coordinator Progress Note    Admission Date/Time:  6/6/2018  Attending MD:  Bhavik Blackmon MD    Data  Chart reviewed, discussed with interdisciplinary team.   Patient was admitted for:    Cerebrovascular accident (CVA), unspecified mechanism (H)  Aphasia  Personal history of tobacco use, presenting hazards to health  Long-term (current) use of anticoagulants.    Notified by DAYAN Kennedy that Cardiology team would like a provider only care conference arranged for today.  Confirmed with Cardiology team that they would like Prerna to participate.   Plan for 1 p.m..  Conference room [id number].     Ethics - Yisel Olsen confirmed that rep from the Ethics Committee would be present for the care conference today.      GI- Per discussion with BONILLA Fontenot she spoke with the cardiolgy team and they will be outreaching to the GI Luminal team to attend the care conference.    DAYAN Kennedy; Vivi LVAD Coordinator also planning to attend care conference.    Carmen León RN BSN, PHN Float RN Care Coordinator covering for Misti Abdalla RN Care Coordinator  [email or web address]  Pager [phone]  6/19/2018 11:04 AM

## 2018-06-19 NOTE — PROGRESS NOTES
[facility] ID SERVICE: ONGOING CONSULTATION   Sohan Rendon : 1950 Sex: male  MRN: [id number] Attending Physician: Bhavik Blackmon MD  Date of Service: 2018  PROBLEM LIST:   1. VRE growth in urine: it is likely that broad spectrum antibiotics for Enteorbacter selected for VRE, and the significance of bacteruria here is unclear (infection vs colonization). Patient without fever or vital signs concerning for ascending urinary tract infection at this time. In past, his UTI's have presented with symptoms, however, as patient is currently unable to communicate it is difficult to ascertain. Given persistent leukocytosis, UA with pyuria, lack of indwelling catheter, and presence of LVAD, it would be reasonable to treat in this case with a short course for VRE with antibiotics for 5-7 days. Could discontinue antibiotics for Enterobacter as he has received an adequate course at this point.      2. Aspiration pneumonia: Most recent CXR clear and antibiotic course should have provided enough coverage to provide resolution of pneumonia. No need to treat any longer for this currently.     3. History of Latent TB: patient received 9 months of INH treatment in . CXR without evidence of active TB. Rust colored sputum in the setting of therapeutic heparin could be small bleeds. We will follow AFB cultures, however, no further need for subsequent cultures or airborne isolation as clinical picture does not seem consistent with active TB.    RECOMMENDATIONS:   1. Would recommend continuing linezolid, 600mg BID for total of 5 days   - does not need to be IV   - end date 18  2. No need for further infectious work up at this time, unless new symptoms or worsening leukocytosis.  3. If patient becomes febrile or with clinical picture concerning for sepsis, would recommend initiating infectious work up including CBC, blood cultures x2, urine culture, sputum culture, CXR, and CRP.    Thank you for the  consult; we will sign off at this point given definitive antibiotic plan. Always happy to revisit the case though if questions, concerns, or new problems.  Feel free to call anytime.    Patient discussed my attending Dr. Vanessa.    Lyyl Myers MD  Internal Medicine & Pediatrics PGY1  Pager: [phone]      Attestation:  This patient has been seen and evaluated by me, Delgado Vanessa MD.  I have independently examined and discussed this patient with the resident and agree with the findings and recommendations in this note. I have reviewed the patient's History and today's Medications, Vital Signs, Labs and Imaging.      I have edited this note to reflect my findings.      69yo man with a h/o ischemic cardiomyopathy and LVAD placement in 2012 as destination therapy, since complicated by hemolysis, pump thrombosis, and multiples strokes that is currently admitted with a new large MCA stroke. He has a history of recurrent UTI, and current hospitalization has been complicated by presumed Enterobacter urinary tract infection manifested by leukocytosis and pyuria. He remains stable after discontinuation of cefepime/metronidazole and is tolerating linezolid, which we continue to continue 5-day course.    Delgado Vanessa MD   ID Red Service  [phone]     CHIEF INFECTIOUS DISEASES COMPLAINT:  VRE growth in urine culture    INTERVAL HISTORY:   No acute events overnight. Patient remained afebrile and blood cultures are NGTD at 4 days of incubation. Cefepime and flagyl were discontinued yesterday and IV linazolid was started. Patient had 5x stool yesterday and one so far today. Daughter feels he is more awake today and interactive with her.    ROS:  A five-point review of systems was obtained and was negative with the exception of that which is described above.    ALLERGIES:  Allergies   Allergen Reactions     Seasonal Allergies      CURRENT MEDICATIONS:  No current outpatient prescriptions on file.     CURRENT  ANTI-INFECTIVES:   IV Linazolid 600 mg BID     EXAMINATION:   VS: BP 94/85 (BP Location: Left arm)  Temp 97.2  F (36.2  C) (Axillary)  Resp 16  Ht 1.829 m (6')  Wt 84.4 kg (186 lb 1.1 oz)  SpO2 100%  BMI 25.24 kg/m2   GENERAL:  well-developed, well-nourished, eyes closed, sitting up in bed, follows some directions from daughter  ENT:  Nares patent without drainage, NG tube in left nares. Oropharynx is moist without exudates or ulcers. Tongue is midline  NECK:  Supple. No cervical lymphadenopathy  LUNGS:  Clear to auscultation bilateral. Easy respiratory effort. Dry cough intermittently.    CARDIOVASCULAR:  Mechanical hum of LVAD. Mild edema.  ABDOMEN:  Increased bowel sounds, soft, nontender, rounded in appearance. No appreciable hepatosplenomegaly  : condom cath in place, no skin breakdown in groin.  SKIN:  No acute rashes.  Line(s) are in place (2x PIV right arm) without any surrounding erythema or exudate. No stigmata of endocarditis. Extremities are warm and dry. No evidence of bruising or bleeding.   NEUROLOGIC:  tired appearing, does not respond to questions, moving RUE    NEW DATA/RESULTS:   All interval basic labs, microbiology results, and imaging were personally reviewed.  Reviewed medicine test (PFTs, EKG, cardiac echo or cath): YES - no new results    Culture Micro   Date Value Ref Range Status   06/15/2018 No growth after 4 days  Preliminary   06/15/2018 No growth after 4 days  Preliminary   06/14/2018 >100,000 colonies/mL  Enterococcus faecium (VRE)   (A)  Final   06/09/2018 (A)  Final    >100,000 colonies/mL  Enterobacter cloacae complex     05/10/2018 No growth  Final       Recent Labs   Lab Test  06/13/18   1332  08/09/17   0903  05/06/17   0731  05/05/17   0621  05/02/17   2247  04/21/16   0720   CRP  39.2  58.5*  220.0*  190.0*  91.0*  14.0*     Recent Labs   Lab Test  06/19/18   0524  06/18/18   0500  06/17/18   0409  06/16/18   0742  06/16/18   0426  06/15/18   0557   WBC  14.9*  13.3*   13.2*  15.3*  15.4*  18.9*     Recent Labs   Lab Test  06/19/18   0524  06/18/18   0500  06/17/18   0409  06/16/18   0426   CR  1.82*  1.93*  2.03*  2.14*   GFRESTIMATED  37*  35*  33*  31*       Hematology Studies:   Recent Labs   Lab Test  06/19/18   0524  06/18/18   0500  06/17/18   0409  06/16/18   0742  06/16/18   0426  06/15/18   0557   06/08/18   1923  06/06/18   2115   05/31/18   0432  05/24/18   1618  05/11/18   0551  05/10/18   1045   WBC  14.9*  13.3*  13.2*  15.3*  15.4*  18.9*   < >  10.2  8.4   < >  7.4  14.4*  6.6  7.4   ANEU   --    --    --    --    --    --    --   7.9  5.7   --   4.6  9.4*  4.1  5.1   AEOS   --    --    --    --    --    --    --   0.5  0.6   --   0.7  0.6  0.6  0.6   HCT  27.8*  26.4*  26.4*  26.8*  27.2*  32.5*   < >  28.8*  28.7*   < >  26.4*  30.0*  28.9*  31.8*   PLT  230  231  207  181  191  209   < >  188  219   < >  251  196  200  210    < > = values in this interval not displayed.       Metabolic Studies:   Recent Labs   Lab Test  06/19/18   0524  06/18/18   2006  06/18/18   1247  06/18/18   0500   06/17/18   0409   NA  144  144  144  145*   < >  144   BUN  62*   --    --   69*   --   69*   CO2  18*   --    --   17*   --   18*   CR  1.82*   --    --   1.93*   --   2.03*   GFRESTIMATED  37*   --    --   35*   --   33*    < > = values in this interval not displayed.       Hepatic Studies:   Recent Labs   Lab Test  06/06/18 2115 06/02/18   1214  05/31/18   0432   BILITOTAL  0.7  0.6  0.6   ALKPHOS  117  102  105   ALBUMIN  3.2*  3.0*  3.1*   AST  50*  64*  Unsatisfactory specimen - hemolyzed   ALT  19  20  23       Immunologlobulins:   Recent Labs   Lab Test  05/03/17   0350  01/16/16   0115  06/07/15   1742   SED  12  6  28*

## 2018-06-19 NOTE — PROGRESS NOTES
LVAD Social Work Services Progress Note        Collaborated with: [facility]    Data: SW made referral earlier in the week to [facility].   Intervention: DAYAN received phone call reporting that Sohan doesn't currently meet criteria to go to rehab. He needs to be able to follow directions and show consistency in learning for at least 2 days.   Education provided by DAYAN: none  Plan:    Discharge Plans in Progress: pending medical stability    Barriers to d/c plan: lvad patient, total care, new stroke    Follow up Plan: DAYAN will update team.     Pager [phone]

## 2018-06-19 NOTE — PROGRESS NOTES
Cardiology Heart failure Progress Note    Assessment & Plan   This is a 69 yo M with a history of ICM s/p HM II in 2012 as destination therapy with a course complicated by hemolysis, pump thrombosis and multiple strokes (subcortical, R PICA, R MCA), PFO s/p closure in 2012,MVr with carbomedics ring ICD placement in 2011, latent TB, PFO s/p closure in 2012, CKD presenting with new altered mental status and aphasia CT showing large R MCA encephalomalacia and CTA showing occluded petrous L ICA of unclear chronicity, CT scan 6/7 shows new large MCA stroke. INR on admission was 2     - heart failure 2/2 ICM s/p HM II as DT in 2012  - Stage D NYHA class III  - complicated by hemolysis, suspected pump thrombosis with persistently elevated LDH and strokes admitted with new L ICA stroke.  -PTA cardiac meds are: metoprolol XL 50mg OD, losartan 25mg OD, asa, lasix 20mg PRN-  -  known pump thrombosis and is not a candidate for pump exchange or further therapies- resume coumadin today  - MAP in the 90's SCr at baseline- consider restarting losartan at 12.5mg OD (1/2 of home dose)  - Anticoagulated with coumadin pta (INR goal 1.8-2.3) INR on admission 2. Continue coumadin will not bridge given hemoptysis and melanotic?stools    - new LMCA stroke  - on half dose of home losartan  - PT/OT  - speech therapy- on TF currently, now ok for puree diet and thick nectar  - history of seizures- continue keppra-    ID  - treated for an enterobacter UTI and aspiration pna with cefepime and flagyl, repeat UCx grew VRE- linezolid course of 5d appreciate ID recs    - Hypernatremia- in the setting of poor PO intake-resolved  - free water flushes       Feeding- cycling TF overnights  and pureed diet  DVT prophylaxis- coumadin for LVAD  Dispo- d/c home or TCU in 2-3d? Provider meeting today was not approved for rehab  Code-DNR/DNI    Case discussed with Dr. Desire Ayala  Cardiology fellow, PGY-4    Interval History    Afebrile  Hypernatremia resolved will continue water flushes to keep it in the 140's   Two more questionable melanotic stool Hgb stable  On cefepime and flagyl ->linezolid per Id recs      Physical Exam   Temp: 97  F (36.1  C) Temp src: Axillary BP: 94/76   Heart Rate: 94 Resp: 16 SpO2: 100 % O2 Device: None (Room air)    Vitals:    06/14/18 0700 06/16/18 0453 06/17/18 0553   Weight: 86.8 kg (191 lb 4.8 oz) 86.8 kg (191 lb 5.8 oz) 84.4 kg (186 lb 1.1 oz)     Vital Signs with Ranges  Temp:  [97  F (36.1  C)-98.7  F (37.1  C)] 97  F (36.1  C)  Heart Rate:  [85-99] 94  Resp:  [14-18] 16  BP: ()/(70-89) 94/76  SpO2:  [94 %-100 %] 100 %  I/O last 3 completed shifts:  In: 2300 [NG/GT:1110]  Out: 1350 [Urine:1350]    Heart Rate: 94, Blood pressure 94/76, temperature 97  F (36.1  C), temperature source Axillary, resp. rate 16, height 1.829 m (6'), weight 84.4 kg (186 lb 1.1 oz), SpO2 100 %.  186 lbs 1.09 oz  GEN: obtunded,  NAD.  CV:  LVAD humm   LUNGS:  Clear to auscultation bilaterally   ABD:  Active bowel sounds, soft, non-tender/non-distended.  No rebound/guarding/rigidity.  EXT:  No edema or cyanosis.      Medications     IV fluid REPLACEMENT ONLY 10 mL/hr at 06/14/18 2323     IV fluid REPLACEMENT ONLY       - MEDICATION INSTRUCTIONS -       - MEDICATION INSTRUCTIONS -       Reason ACE/ARB/ARNI order not selected       Reason beta blocker order not selected       Warfarin Therapy Reminder         aspirin  81 mg Oral or Feeding Tube Daily     finasteride  5 mg Oral or Feeding Tube Daily     insulin aspart  1-12 Units Subcutaneous TID     insulin aspart  1-22 Units Subcutaneous TID     insulin aspart   Subcutaneous TID AC     insulin isophane human  20 Units Subcutaneous QAM     insulin isophane human  88 Units Subcutaneous Q24H     levETIRAcetam  750 mg Oral or Feeding Tube BID     linezolid  600 mg Intravenous Q12H     loratadine  10 mg Oral or Feeding Tube Daily     losartan  12.5 mg Oral or Feeding Tube Daily      melatonin  10 mg Oral or Feeding Tube At Bedtime     modafinil  100 mg Oral or Feeding Tube Daily     multivitamin, therapeutic with minerals  1 tablet Oral or Feeding Tube Daily     pantoprazole  20 mg Oral or Feeding Tube QAM AC     protein modular  1 packet Per Feeding Tube TID     thiamine  100 mg Oral or Feeding Tube Daily     warfarin  6 mg Oral or Feeding Tube ONCE at 18:00       Data     Recent Labs  Lab 06/19/18  0524 06/18/18  2125 06/18/18 2006 06/18/18  1247 06/18/18  0500  06/17/18  0409   WBC 14.9*  --   --   --  13.3*  --  13.2*   HGB 8.0* 8.0*  --   --  7.5*  < > 7.3*   *  --   --   --  105*  --  104*     --   --   --  231  --  207   INR 1.49*  --   --   --  1.42*  --  1.33*     --  144 144 145*  < > 144   POTASSIUM 4.2  --   --   --  3.9  --  4.0   CHLORIDE 116*  --   --   --  116*  --  113*   CO2 18*  --   --   --  17*  --  18*   BUN 62*  --   --   --  69*  --  69*   CR 1.82*  --   --   --  1.93*  --  2.03*   ANIONGAP 10  --   --   --  12  --  12   JOSÉ 8.6  --   --   --  8.8  --  8.8   *  --   --   --  174*  --  168*   < > = values in this interval not displayed.    No results found for this or any previous visit (from the past 24 hour(s)).    I personally provided care for this patient, reviewed chart, discussed course with patient, housestaff and consulting physicians.  I answered all questions.    Spencer Phipps M.D.  Division of Cardiology  Department of Medicine

## 2018-06-19 NOTE — PROVIDER NOTIFICATION
Notified Dr. Ta with cards 2:    -Pt had 3 rocio colored/red stools this evening, vitals remain stable. Hemoglobin lab ordered.   -Pt's sodium 144, still has 200cc Q4 water flushes ordered. Will continue with current water flushes regime and continue q8 sodium checks.

## 2018-06-19 NOTE — PROGRESS NOTES
Pt's family did not have any VAD related questions/concerns. Pt was asleep at the time of visit. Emotional support offered.

## 2018-06-19 NOTE — PROVIDER NOTIFICATION
Paged Cards 2 MD regarding pt's recent episode of emesis. Zofran given and TF shut off for the next hour. No new orders at this time.

## 2018-06-19 NOTE — PLAN OF CARE
Problem: Patient Care Overview  Goal: Plan of Care/Patient Progress Review  RN  1. Pt will be hemodynamically stable.  2. Pt will be free from injury.  3. Labs will be wnl.   Outcome: No Change  Temp:  [97.1  F (36.2  C)-98.7  F (37.1  C)] 98.7  F (37.1  C)  Heart Rate:  [85-99] 91  Resp:  [14-18] 18  BP: ()/(59-89) 99/73  SpO2:  [95 %-99 %] 95 %  Shift 4930-1412    Neuro: opens eyes intermittently, not verbalizing, L side flaccid. Sleeping in between cares.   Cardiac: VAD without alarms this shift, systems check performed and dressing changed. MAP >65.  Respiratory:  RA, o2 spot checked.   GI/: 2 loose rocio/red loose stools, hemoglobin result pending. Adequate UO per external catheter.   Diet/appetite: DD1 nectar thickened liquids, no intake this shift. TF @ goal, tolerating well.   Activity: repositioned q1-2 per family and staff.   Pain: resting in between cares, no non-verbal signs of pain.   Skin: no new deficits.       Continue with POC. Notify primary team with changes.

## 2018-06-19 NOTE — PLAN OF CARE
Problem: Patient Care Overview  Goal: Plan of Care/Patient Progress Review  RN  1. Pt will be hemodynamically stable.  2. Pt will be free from injury.  3. Labs will be wnl.   SLP session cancelled: pt too lethargic for participation upon attempt this afternoon, schedule did not allow another check back today. Will f/u per POC.

## 2018-06-19 NOTE — CARE CONFERENCE
Provider Only Care Conference held today:    In Attendance:  Jenna Deutsch,   Yisel Olsen, Ethics Committee  Vivi, LVAD Coordinator  Yudy Mai, Dietician  Dr. Leandro Ayala, Cardiology  Carmen León, SHAW[facility] Care Coordinator    Reviewed/discussed possible PEG tube placement. Pt currently has a temporary NG tube in place and has been receiving enteral feeds.   The NG tube was supposed to be temporary while pt continued to increase his own oral intake however pt has not been able to take in enough oral nutrition that would sustain him.   Concern noted that pt is at high risk for more strokes as he would have to be off anticoagulation prior to the procedure as well as at least 48 hours after the procedure.   Cardiology team will follow up with GI team regarding their willingness to actually place the PEG tube.  Plan to arrange a care conference with pt,family and provider teams after cardiology team has reviewed with GI team.       Carmen León RN BSN, PHN Float RN Care Coordinator covering for Misti Abdalla RN Care Coordinator  [email or web address]  Pager [phone]  6/19/2018 2:47 PM

## 2018-06-20 NOTE — PLAN OF CARE
Problem: Patient Care Overview  Goal: Plan of Care/Patient Progress Review  RN  1. Pt will be hemodynamically stable.  2. Pt will be free from injury.  3. Labs will be wnl.   Outcome: No Change  Neuro: Unable to assess orientation, unable to follow commands. Occasionally spoke to family in native language.    Cardiac: SR with trigeminal PVC. LVAD, flow unavailable, other parameters WDL. No alarms overnight.    Respiratory: Sating >94% on RA, clear/diminished. Frequent, productive cough.   GI/: Adequate urine output via condom cath. No BM, unable to obtain stool sample. Family denies that pt is experiencing nausea.    Diet/appetite: DD1 with nectar thick, no intake. Tolerating cycled TF 85 ml/hr with 200ml q4hr flushes via NJ.   Activity:  Assist of 2 with lift, up to chair x1 per families request.   Pain: Difficult to assess. No nonverbal indicators of pain.   Skin: No new deficits noted. Open skin tear on scrotum, barrier cream applied.   LDA's: R PIV x2, SL.     Plan: Continue with POC. Notify primary team with changes.  BP (!) 114/92 (BP Location: Left arm)  Pulse 95  Temp 98  F (36.7  C) (Axillary)  Resp 16  Ht 1.829 m (6')  Wt 84.4 kg (186 lb 1.1 oz)  SpO2 98%  BMI 25.24 kg/m2        Problem: Cardiac Disease Comorbidity  Goal: Cardiac Disease  Patient comorbidity will be monitored for signs and symptoms of Cardiac Disease.  Problems will be absent, minimized or managed by discharge/transition of care.   Outcome: No Change  LVAD, no flow detected. SR with trigeminal PVC. Continue to monitor.     Problem: Peripheral Vascular/Peripheral Neurovascular Disease Comorbidity  Goal: Peripheral Vascular/Peripheral Neurovascular Disease  Patient comorbidity will be monitored for signs and symptoms of Peripheral Vascular/Peripheral Neurovascular Disease condition.  Problems will be absent, minimized or managed by discharge/transition of care.   Outcome: No Change  Able to move RUE, No contraction BLE. Continue to  monitor.

## 2018-06-20 NOTE — PROGRESS NOTES
Cardiology Heart failure Progress Note    Assessment & Plan   This is a 69 yo M with a history of ICM s/p HM II in 2012 as destination therapy with a course complicated by hemolysis, pump thrombosis and multiple strokes (subcortical, R PICA, R MCA), PFO s/p closure in 2012,MVr with carbomedics ring ICD placement in 2011, latent TB, PFO s/p closure in 2012, CKD presenting with new altered mental status and aphasia CT showing large R MCA encephalomalacia and CTA showing occluded petrous L ICA of unclear chronicity, CT scan 6/7 shows new large MCA stroke. INR on admission was 2     - heart failure 2/2 ICM s/p HM II as DT in 2012  - Stage D NYHA class III  - complicated by hemolysis, suspected pump thrombosis with persistently elevated LDH and strokes admitted with new L ICA stroke.  -PTA cardiac meds are: metoprolol XL 50mg OD, losartan 25mg OD, asa, lasix 20mg PRN-  -  known pump thrombosis and is not a candidate for pump exchange or further therapies- resume coumadin today  - MAP in the 90's SCr at baseline- consider restarting losartan at 12.5mg OD (1/2 of home dose)  - Anticoagulated with coumadin pta (INR goal 1.8-2.3) INR on admission 2. Continue coumadin will not bridge given hemoptysis and melanotic?stools    - new LMCA stroke  - on half dose of home losartan  - PT/OT  - speech therapy- on TF currently, now ok for puree diet and thick nectar  - history of seizures- continue keppra    ID  - treated for an enterobacter UTI and aspiration pna with cefepime and flagyl, repeat UCx grew VRE- linezolid course of 5d appreciate ID recs  - Bcx grew on day 5 - GPC's - 1/2 colonizer?    - Hypernatremia- in the setting of poor PO intake-resolved  - free water flushes       Feeding- cycling TF overnights  and pureed diet- discussions with ethics and with GI re feasibility and appropriateness of PEG  DVT prophylaxis- coumadin for LVAD  Dispo- d/c home or TCU in 2-3d? Provider meeting today was not approved for  rehab  Code-DNR/DNI    Case discussed with Dr. Desire Reeves Ayala  Cardiology fellow, PGY-4    Interval History   Afebrile  Hypernatremia resolved   linezolid per Id recs  1/2 BCx GPC's-6/15    Physical Exam   Temp: 98.3  F (36.8  C) Temp src: Axillary BP: 102/77   Heart Rate: 71 Resp: 16 SpO2: 99 % O2 Device: None (Room air)    Vitals:    06/14/18 0700 06/16/18 0453 06/17/18 0553   Weight: 86.8 kg (191 lb 4.8 oz) 86.8 kg (191 lb 5.8 oz) 84.4 kg (186 lb 1.1 oz)     Vital Signs with Ranges  Temp:  [97.5  F (36.4  C)-98.8  F (37.1  C)] 98.3  F (36.8  C)  Heart Rate:  [71-97] 71  Resp:  [16-18] 16  BP: (101-124)/() 102/77  SpO2:  [98 %-100 %] 99 %  I/O last 3 completed shifts:  In: 2400 [NG/GT:1170]  Out: 1325 [Urine:1325]    Heart Rate: 71, Blood pressure 102/77, pulse 95, temperature 98.3  F (36.8  C), temperature source Axillary, resp. rate 16, height 1.829 m (6'), weight 84.4 kg (186 lb 1.1 oz), SpO2 99 %.  186 lbs 1.09 oz  GEN: obtunded,  NAD.  CV:  LVAD humm   LUNGS:  Clear to auscultation bilaterally   ABD:  Active bowel sounds, soft, non-tender/non-distended.  No rebound/guarding/rigidity.  EXT:  No edema or cyanosis.      Medications     IV fluid REPLACEMENT ONLY 10 mL/hr at 06/14/18 2323     IV fluid REPLACEMENT ONLY       - MEDICATION INSTRUCTIONS -       - MEDICATION INSTRUCTIONS -       Reason ACE/ARB/ARNI order not selected       Reason beta blocker order not selected       Warfarin Therapy Reminder         aspirin  81 mg Oral or Feeding Tube Daily     finasteride  5 mg Oral or Feeding Tube Daily     insulin aspart  1-12 Units Subcutaneous TID     insulin aspart  1-22 Units Subcutaneous TID     insulin aspart   Subcutaneous TID AC     insulin isophane human  20 Units Subcutaneous QAM     insulin isophane human  88 Units Subcutaneous Q24H     levETIRAcetam  750 mg Oral or Feeding Tube BID     linezolid  600 mg Oral or Feeding Tube Q12H GUS     loratadine  10 mg Oral or Feeding  Tube Daily     losartan  12.5 mg Oral or Feeding Tube Daily     melatonin  10 mg Oral or Feeding Tube At Bedtime     modafinil  100 mg Oral or Feeding Tube Daily     [START ON 6/21/2018] multivitamins with minerals  15 mL Oral or Feeding Tube Daily     pantoprazole  20 mg Oral or Feeding Tube QAM AC     protein modular  1 packet Per Feeding Tube TID     thiamine  100 mg Oral or Feeding Tube Daily     warfarin  7 mg Oral ONCE at 18:00       Data     Recent Labs  Lab 06/20/18  0516 06/19/18  0524 06/18/18  2125 06/18/18 2006 06/18/18  0500   WBC 17.4* 14.9*  --   --   --  13.3*   HGB 7.5* 8.0* 8.0*  --   --  7.5*   * 101*  --   --   --  105*    230  --   --   --  231   INR 1.55* 1.49*  --   --   --  1.42*    144  --  144  < > 145*   POTASSIUM 4.1 4.2  --   --   --  3.9   CHLORIDE 115* 116*  --   --   --  116*   CO2 16* 18*  --   --   --  17*   BUN 64* 62*  --   --   --  69*   CR 1.80* 1.82*  --   --   --  1.93*   ANIONGAP 12 10  --   --   --  12   JOSÉ 8.5 8.6  --   --   --  8.8   * 168*  --   --   --  174*   < > = values in this interval not displayed.    No results found for this or any previous visit (from the past 24 hour(s)).    I personally provided care for this patient, reviewed chart, discussed course with patient, housestaff and consulting physicians.  I answered all questions.    Spencer Phipps M.D.  Division of Cardiology  Department of Medicine

## 2018-06-20 NOTE — PLAN OF CARE
Problem: Patient Care Overview  Goal: Plan of Care/Patient Progress Review  RN  1. Pt will be hemodynamically stable.  2. Pt will be free from injury.  3. Labs will be wnl.   /85  Pulse 95  Temp 97.5  F (36.4  C) (Axillary)  Resp 16  Ht 1.829 m (6')  Wt 84.4 kg (186 lb 1.1 oz)  SpO2 100%  BMI 25.24 kg/m2    Neuro: Alert, though lethargic at times.  Unable to assess orientation, minimally responds to family who speak his language. Not following commands.  Cardiac: SR with PVCs, LVAD in place. Flow for LVAD unavailable through most of the day, Cards 2 team is aware of this. No LVAD alarms, dressing change complete, system check performed. VSS, afebrile.  Respiratory: Sating upper 90s on RA. Because of poor perfusion it is difficult to get accurate readings at all times, spot checking at this time.  GI/: Adequate urine output, condom cath in place. BM X4, incontinent of loose stools, stool sample needed. Family at bedside is very helpful.  Diet/appetite: Tolerating dysphagia diet, minimal appetite. Zofran given PRN this AM to ensure no vomitting with meds. NJ with cycled TF, 6PM-8AM. Possibility of future G tube placement.  Activity: Bedrest, turning q2h or as needed.    Pain: At acceptable level on current regimen. PRN tylenol given this AM.  Skin: No new deficits noted.  LDA's: NJ and LVAD WDL.  Plan: Continue with POC. Notify primary team with changes.

## 2018-06-20 NOTE — PROGRESS NOTES
GASTROENTEROLOGY CONSULTATION      Date of Admission:  6/6/2018  Reason for Admission: AMS  Date of Consult  6/20/2018   Requesting Physician:  Bhavik Blackmon MD         Reason for Consultation:   PEG tube for nutrition           History of Present Illness:   Patient seen and examined at 1500. History is obtained from the patient's daughter due to AMS, recent CVA.    PEG INDICATION: Nutrition                Review of Systems:   Unable to obtain due to mental status         Physical Exam:   Temp: 98.3  F (36.8  C) Temp src: Axillary BP: 102/77   Heart Rate: 71 Resp: 16 SpO2: 99 % O2 Device: None (Room air)    Wt:   Wt Readings from Last 2 Encounters:   06/17/18 84.4 kg (186 lb 1.1 oz)   05/31/18 89.3 kg (196 lb 13.9 oz)        General: Elderly chronically ill appearing male in NAD. Obtunded, eyes closed  Daughter answers all questions  Abdomen: Soft, non-tender, non-distended.  BS +.  No hepatosplenomegaly. No rebound or peritoneal signs  Neuro: does not respond to questions            Data:   Labs and imaging below were independently reviewed and interpreted    LAB WORK:    BMP  Recent Labs  Lab 06/20/18  0516 06/19/18  0524 06/18/18 2006 06/18/18  1247 06/18/18  0500  06/17/18  0409    144 144 144 145*  < > 144   POTASSIUM 4.1 4.2  --   --  3.9  --  4.0   CHLORIDE 115* 116*  --   --  116*  --  113*   JOSÉ 8.5 8.6  --   --  8.8  --  8.8   CO2 16* 18*  --   --  17*  --  18*   BUN 64* 62*  --   --  69*  --  69*   CR 1.80* 1.82*  --   --  1.93*  --  2.03*   * 168*  --   --  174*  --  168*   < > = values in this interval not displayed.  CBC  Recent Labs  Lab 06/20/18  0516 06/19/18 0524 06/18/18  0500  06/17/18  0409   WBC 17.4* 14.9*  --  13.3*  --  13.2*   RBC 2.56* 2.75*  --  2.51*  --  2.53*   HGB 7.5* 8.0*  < > 7.5*  < > 7.3*   HCT 26.7* 27.8*  --  26.4*  --  26.4*   * 101*  --  105*  --  104*   MCH 29.3 29.1  --  29.9  --  28.9   MCHC 28.1* 28.8*  --  28.4*  --  27.7*   RDW Dimorphic  population - unable to calculate 27.9*  --  28.0*  --  27.9*    230  --  231  --  207   < > = values in this interval not displayed.  INR  Recent Labs  Lab 06/20/18  0516 06/19/18  0524 06/18/18  0500 06/17/18  0409   INR 1.55* 1.49* 1.42* 1.33*       IMAGING:  EXAMINATION: TEMPORARY, 5/1/2018 10:08 PM     TECHNIQUE:  Helical CT images from the lung bases through the  symphysis pubis were obtained  without IV contrast.      COMPARISON: CT abdomen/pelvis 3/18/2018     HISTORY: Acute pancreatitis, right upper quadrant ultrasound shows no  gallstones, patient has renal insufficiency with creatinine of 2.14,  please evaluate for pancreatic lesion/mass     FINDINGS:  Abdomen/pelvis: Left upper quadrant left ventricular assist device  contributes to streak artifact which partially obscures visualization  of the upper abdomen. The LVAD device and its stripe line are  unremarkable in appearance.     Fatty infiltration of the pancreas which is otherwise normal,  acknowledging limited visualization. No significant peripancreatic fat  stranding. No definite pancreatic lesion or fluid collection.  Gallbladder is surgically absent. Scattered foci of intrahepatic  pneumobilia with common bile duct stent in place. No intrahepatic or  extra hepatic biliary ductal dilatation. Lobulated appearance to the  spleen is unchanged over the course of multiple prior exams, not  enlarged. Normal adrenals. Simple fluid attenuation exophytic cysts  arising from both kidneys are not significantly changed. Asymmetric  atrophy of the right kidney, unchanged. No hydronephrosis normal  urinary bladder and prostate gland. Small sliding-type hiatal hernia.  No dilated small or large bowel. Scattered colonic diverticulosis  without diverticulitis. Normal appendix. No free air, free fluid,  pneumatosis, or portal venous gas. No abdominal aortic aneurysm.     Lung bases: Mild bibasilar atelectasis, left greater than right. No  pleural or  pericardial effusion. Heart size is enlarged. Extensive  coronary artery calcification and annular mitral valve calcification.  Partially visualized median sternotomy wires.     Bones and soft tissues: Degenerative changes in the lower lumbar  spine. Chronic right femoral subcapital fracture unchanged in  appearance. No acute-appearing or suspicious osseous lesion surgical  clips in the right groin. Diffuse muscular atrophy.         IMPRESSION:   1. Suboptimal visualization of the pancreas secondary to lack of  intravenous contrast and streak artifact from left upper quadrant left  ventricular assist device. No convincing CT findings of acute  pancreatitis or evidence of pancreatic mass lesion or fluid  collection. There is fatty infiltration and atrophy of the pancreas.   2. Stable positioning of common bile duct stent without intrahepatic  or extra hepatic biliary ductal dilatation. Gallbladder is surgically  absent.  3. Unchanged asymmetric atrophy of the right kidney and bilateral  simple renal cysts.  4. 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  5. Diverticulosis without diverticulitis.           ASSESSMENT AND RECOMMENDATIONS:   Assessment:  68 year old male with a history of ICM s/p LVAD since 2012 with a course complicated by hemolysis, pump thrombosis and multiple strokes (subcortical, R PICA, R MCA), PFO s/p closure in 2012, MVr with carbomedics ring ICD placement in 2011, latent TB, CKD presenting with new altered mental status and aphasia CT showing large R MCA encephalomalacia and CTA showing occluded petrous L ICA of unclear chronicity, CT scan 6/7 shows new large MCA stroke. GI team asked to consider PEG tube for nutrition. Reviewed most recent CT scan with Dr. Grove who does not feel that there is a good window for placement of PEG tube. This would be very high risk for gastric perforation and/or peritonitis. Furthermore, the patient has a pump thrombosis and recent CVA  requiring anticoagulation putting the patient at very high risk for bleeding perioperatively (ALL anticoagulation would need to be held for at least 48 hours post-procedure). Would not recommend PEG tube placement at this time. Would continue NJTF for nutrition and medications.     Recommendations:  PEG tube not safe, do not recommend  Discussed with cardiology team    Discussed this at length with the patient's daughter who agrees that if the procedure is not safe that they do not want the PEG tube.      Thank you for involving us in this patient's care. Please do not hesitate to contact the GI service with any questions or concerns. GI will sign off.    Pt seen and care plan discussed with Dr. Grove, GI staff physician.    Violeta Fleming PA-C  Therapeutic Endoscopy/Pancreaticobiliary PATRICIA  [facility]  Pager *[phone]  =======================================================================

## 2018-06-21 NOTE — PROGRESS NOTES
Care Coordinator - Discharge Planning    Admission Date/Time:  6/6/2018  Attending MD:  Bhavik Blackmon MD     Data  Chart reviewed, discussed with interdisciplinary team.   Patient was admitted for:   1. Cerebrovascular accident (CVA), unspecified mechanism (H)    2. Aphasia    3. Personal history of tobacco use, presenting hazards to health    4. Long-term (current) use of anticoagulants         Assessment  Per Cardiology Team Pt is medically stable for discharge and will require tube feedings post discharge. I have spoke to Pts Daughter Almaz to offer choice of Infusion agencies for tube feeding support. Pt has Home Care support through [facility], and prefer [facility] for Enteral support.  I have made a referral to [facility] #[phone], Pt has 100% coverage for tube feeding formula and supplies.  [facility] updated regarding discharge tomorrow and need to have skilled nursing visit tomorrow after Pt arrives home from the hospital for tube feeding teaching.  I have arranged stretcher transport for 2pm tomorrow through [facility].  Pts Daughter Donn is here with Pt and updated regarding above plan.  Donn agreeable to above plan and states Family will be waiting for Pt at home tomorrow afternoon.  Cards 2 also updated regarding above plan.    Coordination of Care and Referrals: Referral made to [facility] for Enteral.  Home Care updated regarding discharge 6/22/18.      Plan  Anticipated Discharge Date:  6/22/18  Anticipated Discharge Plan:  D/C to home with Family, resumption of [facility] and [facility] for tube feeding support.    Misti Abdalla RN   6B care coordinator #[phone]

## 2018-06-21 NOTE — PROGRESS NOTES
Therapy: Enteral   Insurance: [facility]  Co-Insurance: 100%    In reference to admission on 6/6/18 to check enteral coverage    Please contact Intake with any questions, [phone] or In Basket pool, [facility] ([phone]).

## 2018-06-21 NOTE — PLAN OF CARE
Problem: Patient Care Overview  Goal: Plan of Care/Patient Progress Review  RN  1. Pt will be hemodynamically stable.  2. Pt will be free from injury.  3. Labs will be wnl.   BP 95/74 (BP Location: Left arm)  Pulse 95  Temp 97.6  F (36.4  C) (Axillary)  Resp 16  Ht 1.829 m (6')  Wt 84.4 kg (186 lb 1.1 oz)  SpO2 98%  BMI 25.24 kg/m2    Neuro: Alert, though lethargic at times.  Unable to assess orientation, minimally responds to family who speak his language. Not following commands. Swats at people when they are doing patient cares.  Cardiac: SR with PVCs, LVAD in place. Flow for LVAD unavailable through most of the day, Cards 2 team is aware of this. No LVAD alarms, dressing change complete, system check performed. VSS, afebrile.  Respiratory: Sating upper 90s on RA. Because of poor perfusion it is difficult to get accurate readings at all times, spot checking.  GI/: Adequate urine output, condom cath in place. BM X3, incontinent of loose stools. Family at bedside is very helpful.  Diet/appetite: Tolerating dysphagia diet, minimal appetite. Zofran given PRN this AM to ensure no vomitting with meds. NJ with cycled TF, 6PM-8AM, free water decreased to 150mL q4.. Possibility of future G tube placement.  Activity: Up to chair with ceiling lift, was able to tolerate 2 hours.  Pain: At acceptable level on current regimen. PRN tylenol given this AM.  Skin: No new deficits noted.  LDA's: NJ and LVAD WDL.  Plan: Continue with POC. Notify primary team with changes.

## 2018-06-21 NOTE — PROGRESS NOTES
Cardiology Heart failure Progress Note    Assessment & Plan   This is a 67 yo M with a history of ICM s/p HM II in 2012 as destination therapy with a course complicated by hemolysis, pump thrombosis and multiple strokes (subcortical, R PICA, R MCA), PFO s/p closure in 2012,MVr with carbomedics ring ICD placement in 2011, latent TB, PFO s/p closure in 2012, CKD presenting with new altered mental status and aphasia CT showing large R MCA encephalomalacia and CTA showing occluded petrous L ICA of unclear chronicity, CT scan 6/7 shows new large MCA stroke. INR on admission was 2     Plan for today  - continue linezolid  - follow hgb  - meeting with the family this afternoon     - heart failure 2/2 ICM s/p HM II as DT in 2012  - Stage D NYHA class III  - complicated by hemolysis, suspected pump thrombosis with persistently elevated LDH and strokes admitted with new L ICA stroke.  -PTA cardiac meds are: metoprolol XL 50mg OD, losartan 25mg OD, asa, lasix 20mg PRN-  -  known pump thrombosis and is not a candidate for pump exchange or further therapies- resume coumadin today  - MAP in the 90's SCr at baseline- consider restarting losartan at 12.5mg OD (1/2 of home dose)  - Anticoagulated with coumadin pta (INR goal 1.8-2.3) INR on admission 2. Continue coumadin will not bridge given hemoptysis and melanotic?stools    - new LMCA stroke  - on half dose of home losartan  - PT/OT  - speech therapy- on TF currently, now ok for puree diet and thick nectar  - history of seizures- continue keppra    ID  - treated for an enterobacter UTI and aspiration pna with cefepime and flagyl, repeat UCx grew VRE- linezolid course of 5d appreciate ID recs  - Bcx grew on day 5 - GPC's - 1/2 colonizer?- will touch base again with ID    - Hypernatremia- in the setting of poor PO intake-resolved  - free water flushes       Feeding- cycling TF overnights  and pureed diet-   DVT prophylaxis- coumadin for LVAD  Dispo- d/c home. Family coming in this  afternoon to discuss goals of care and d/c plan  Code-DNR/DNI    Case discussed with Dr. Phipps  Family discussion at 4:30 re plan on discharge    Jesusita yAala  Cardiology fellow, PGY-4    Interval History   MAP 80-90's HR 's   Hgb 7.8 from 7.5 emesis last night   WBC down to 17->    Physical Exam   Temp: 97.4  F (36.3  C) Temp src: Axillary BP: 92/68   Heart Rate: 104 Resp: 20 SpO2: 100 % O2 Device: None (Room air)    Vitals:    06/14/18 0700 06/16/18 0453 06/17/18 0553   Weight: 86.8 kg (191 lb 4.8 oz) 86.8 kg (191 lb 5.8 oz) 84.4 kg (186 lb 1.1 oz)     Vital Signs with Ranges  Temp:  [97.3  F (36.3  C)-97.6  F (36.4  C)] 97.4  F (36.3  C)  Heart Rate:  [] 104  Resp:  [16-20] 20  BP: ()/(68-95) 92/68  SpO2:  [96 %-100 %] 100 %  I/O last 3 completed shifts:  In: 2150 [NG/GT:960]  Out: 1705 [Urine:1705]    Heart Rate: 104, Blood pressure 92/68, pulse 95, temperature 97.4  F (36.3  C), temperature source Axillary, resp. rate 20, height 1.829 m (6'), weight 84.4 kg (186 lb 1.1 oz), SpO2 100 %.  186 lbs 1.09 oz  GEN: obtunded,  NAD.  CV:  LVAD humm   LUNGS:  Clear to auscultation bilaterally   ABD:  Active bowel sounds, soft, non-tender/non-distended.  No rebound/guarding/rigidity.  EXT:  No edema or cyanosis.      Medications     IV fluid REPLACEMENT ONLY 10 mL/hr at 06/14/18 2323     IV fluid REPLACEMENT ONLY       - MEDICATION INSTRUCTIONS -       - MEDICATION INSTRUCTIONS -       Reason ACE/ARB/ARNI order not selected       Reason beta blocker order not selected       Warfarin Therapy Reminder         aspirin  81 mg Oral or Feeding Tube Daily     calcium-vitamin D  1 tablet Oral BID w/meals     finasteride  5 mg Oral or Feeding Tube Daily     insulin aspart  1-12 Units Subcutaneous TID     insulin aspart  1-22 Units Subcutaneous TID     insulin aspart   Subcutaneous TID AC     insulin isophane human  20 Units Subcutaneous QAM     insulin isophane human  88 Units Subcutaneous Q24H      levETIRAcetam  750 mg Oral or Feeding Tube BID     linezolid  600 mg Oral or Feeding Tube Q12H GUS     loratadine  10 mg Oral or Feeding Tube Daily     losartan  12.5 mg Oral or Feeding Tube Daily     melatonin  10 mg Oral or Feeding Tube At Bedtime     modafinil  100 mg Oral or Feeding Tube Daily     multivitamins with minerals  15 mL Oral or Feeding Tube Daily     pantoprazole  20 mg Oral or Feeding Tube BID     protein modular  1 packet Per Feeding Tube TID     thiamine  100 mg Oral or Feeding Tube Daily     warfarin  7 mg Oral or Feeding Tube ONCE at 18:00       Data     Recent Labs  Lab 06/21/18  0546 06/20/18  0516 06/19/18  0524   WBC 14.2* 17.4* 14.9*   HGB 7.8* 7.5* 8.0*   * 104* 101*    261 230   INR 1.54* 1.55* 1.49*    142 144   POTASSIUM 3.9 4.1 4.2   CHLORIDE 115* 115* 116*   CO2 18* 16* 18*   BUN 62* 64* 62*   CR 1.73* 1.80* 1.82*   ANIONGAP 11 12 10   JOSÉ 8.5 8.5 8.6   * 162* 168*       No results found for this or any previous visit (from the past 24 hour(s)).    I personally provided care for this patient, reviewed chart, discussed course with patient, housestaff and consulting physicians.  I answered all questions.    Spencer Phipps M.D.  Division of Cardiology  Department of Medicine

## 2018-06-21 NOTE — PROGRESS NOTES
CLINICAL NUTRITION SERVICES - REASSESSMENT NOTE     Nutrition Prescription    RECOMMENDATIONS FOR MDs/PROVIDERS TO ORDER:  Recommend decreasing TF interruptions as pt is not meeting kcal goals  With on going weight loss.   Recommend sending patient out with vitamin D supplementation, current labs are low.     Malnutrition Status:    Non-severe malnutrition in the context of acute illness/injury on chronic illness     Recommendations already ordered by Registered Dietitian (RD):  None at this time    Future/Additional Recommendations:   1. Consider checking folic acid and vitamin B12.      EVALUATION OF THE PROGRESS TOWARD GOALS   Diet: DD1  Nutrition Support: 6/13-___: Nepro 85 mL/hr x 14 hours to provide 1190 mL TF, 2142+ kcals , 96+ g protein, 869 mL H2O, 192 g CHO, and 15 g fiber daily.  6/7-___: 3 packets ProSource (33 g pro, 120 kcals)  Intake: Average TF intakes:  1099ml , 1977 kcals (23), 89 (1)  + modular: 2097 kcals (24), 122 g pro (1.4)    Calorie counts 6/14-6/16, zero PO intakes  Not appropriate for rehab  Plan is for patient to go home with NJ tube   XR feeding tube placement. Impression: Uncomplicated feeding tube placement with tip in the  third/fourth portion of the duodenum     NEW FINDINGS   Weight: ~5% wt loss in the past 2 weeks  GI:  PER GI: PEG tube not safe, do not recommend. Discussed with cardiology team. Discussed this at length with the patient's daughter who agrees that if the procedure is not safe that they do not want the PEG tube.  Labs: Vitamins D 11/17/17: 18 (L)    MALNUTRITION  % Intake: Decreased intake does not meet criteria  % Weight Loss: > 2% in 1 week (severe)  Subcutaneous Fat Loss: None observed - per previous RD, busy with cares  Muscle Loss: Mild, generalized losses in upper body  - per previous RD, busy with cares  Fluid Accumulation/Edema: None noted per team  Malnutrition Diagnosis: Non-severe malnutrition in the context of acute illness/injury on chronic illness      Previous Goals   Total average nutrition intake to meet 0766-5840 kcals/day (25 - 30 kcals/kg) and 103-129 grams protein/day (1.2 - 1.5 grams of pro/kg).  Evaluation: Met- pro, not met- kcals    Previous Nutrition Diagnosis  Inadequate oral intake related to diet order and lack of appetite as evidenced by minimal oral intake per nursing and per pt's daughter.   Evaluation: Declining    CURRENT NUTRITION DIAGNOSIS  Inadequate energy intake related to TF interruptions as evidenced by TF only providing 24 kcals/kg (not goal)      INTERVENTIONS  Implementation  Collaboration with other providers- RD attended provider only care conference on 6/19 to discuss PEG placement.     Goals  Total average nutrition intake to meet 8600-7657 kcals/day (25 - 30 kcals/kg) and 103-129 grams protein/day (1.2 - 1.5 grams of pro/kg).    Monitoring/Evaluation  Progress toward goals will be monitored and evaluated per protocol.      Yudy Mai, RD, MS, LD  6B- Pager: [phone]

## 2018-06-21 NOTE — PLAN OF CARE
Problem: Patient Care Overview  Goal: Plan of Care/Patient Progress Review  RN  1. Pt will be hemodynamically stable.  2. Pt will be free from injury.  3. Labs will be wnl.   Discharge Planner SLP   Patient plan for discharge: Pt not able to state  Current status: Pt continues with coordination and manipulation of bolus, increased oral awareness, increased ability to protect airway. Recommend dysphagia 2 diet and thin liquids with 1:1 assistance, pills crushed in puree, alternate solids and liquids, single bites and sips, pt to be fully upright and alert for all PO intake. SLP to follow per POC.  Barriers to return to prior living situation: Dysphagia, medical stability  Recommendations for discharge: TCU  Rationale for recommendations: Pt will benefit from ongoing intensive SLP services for dysphagia at next level of care given progress toward last known baseline.       Entered by: Anita Gonzalez 06/21/2018 12:53 PM

## 2018-06-21 NOTE — PLAN OF CARE
Met with the family and the nurse at bedside.     We discussed that Mr Rendon is dying currently and that not eating might be the way he is shutting down. The family feels though that since admission his mentation has improved and are confident he is eating more and by the end of the week will be eating enough to remove the feeding tube.     We discussed that the risk of the corpak is excoriation in his nose epistaxis that it does not prevent aspiration and that it may be dislodged as he is transferred or he pulls it and so forth. They are confident they will be able to manage this and are hopeful this will be temporary. Plan is for d/c home tomorrow with corpak. Will discuss with social work and case management to set up home health care and home OT.     Jessuita Ayala  Cardiology fellow, PGY-4

## 2018-06-21 NOTE — PROVIDER NOTIFICATION
Time of notification: 3:51, 4:00 AM  Provider notified: Attempted to call Cards 2   Patient status: Pt had a moderate amount of emesis, maroon in color.     /77 (BP Location: Left arm)  Pulse 95  Temp 97.3  F (36.3  C) (Axillary)  Resp 20  Ht 1.829 m (6')  Wt 84.4 kg (186 lb 1.1 oz)  SpO2 96%  BMI 25.24 kg/m2    Orders received: No new orders at this time.

## 2018-06-21 NOTE — DISCHARGE SUMMARY
[facility], [place]    Discharge Summary  Cards 2    Date of Admission:  6/6/2018  Date of Discharge:  6/22/2018  Discharging Provider: Jesusita Ayala  Date of Service (when I saw the patient): 06/22/18    Discharge Diagnoses   HF 2/2 ICM s/p HMII as DT  MCA stroke  UTI-resolved  Aspiration pneumonia  LVAD pump thrombosis    History of Present Illness   67 yo M with a history of ICM s/p HM II in 2012 as destination therapy with a course complicated by hemolysis, pump thrombosis and multiple strokes (subcortical, R PICA, R MCA), PFO s/p closure in 2012,MVr with carbomedics ring ICD placement in 2011, latent TB, PFO s/p closure in 2012, CKD presenting with new altered mental status and aphasia CT showing large R MCA encephalomalacia and CTA showing occluded petrous L ICA of unclear chronicity, CT scan 6/7 shows new large MCA stroke. INR on admission was 2    Hospital Course   Sohan Rendon was admitted on 6/6/2018.  He was initially admitted to the Neurocrit service. His coumadin was held and feeding tube was started given his aphasia decreased alertness. His course was complicated by hyperglycemia, aspiration pneumonia, hypernatremia and a UTI for which he had an infectious disease consult decided on a 5d course of linezolid. On the fifth day a 1/2 BCx grew GPC's which was deemed a contaminant. He was discharged home with home health care.     Jesusita Ayala  Cardiology fellow, PGY-4    Significant Results and Procedures       Pending Results     Unresulted Labs Ordered in the Past 30 Days of this Admission     Date and Time Order Name Status Description    6/18/2018 0015 AFB Culture Non Blood Preliminary     6/16/2018 2200 Sodium In process     6/15/2018 0722 Blood culture Preliminary           Code Status   DNR / DNI       Primary Care Physician   Shilpi Agosto    Physical Exam   Temp: 98.9  F (37.2  C) Temp src: Oral BP: 99/78   Heart Rate: 101 Resp: 20 SpO2:  95 % O2 Device: None (Room air)    Vitals:    06/14/18 0700 06/16/18 0453 06/17/18 0553   Weight: 86.8 kg (191 lb 4.8 oz) 86.8 kg (191 lb 5.8 oz) 84.4 kg (186 lb 1.1 oz)     Vital Signs with Ranges  Temp:  [97.7  F (36.5  C)-100.1  F (37.8  C)] 98.9  F (37.2  C)  Heart Rate:  [] 101  Resp:  [18-20] 20  BP: ()/(71-82) 99/78  SpO2:  [95 %-100 %] 95 %  I/O last 3 completed shifts:  In: 1965 [NG/GT:860]  Out: 1475 [Urine:1475]    Somnolent no acute distress  LVAD humm  Chest with diminished breath sounds  Abdomen with positive BS  LE with trace edema    Discharge Disposition   Discharged to home  Condition at discharge: Stable    Consultations This Hospital Stay   SWALLOW EVAL SPEECH PATH AT BEDSIDE IP CONSULT  SPEECH LANGUAGE PATH ADULT IP CONSULT  PHARMACY IP CONSULT  PHYSICAL THERAPY ADULT IP CONSULT  OCCUPATIONAL THERAPY ADULT IP CONSULT  SOCIAL WORK IP CONSULT  PATIENT LEARNING CENTER IP CONSULT  CARE COORDINATOR IP CONSULT  PHARMACY TO DOSE WARFARIN  VASCULAR ACCESS CARE ADULT IP CONSULT  CARDIOLOGY HEART FAILURE (HF) IP CONSULT  MEDICATION HISTORY IP PHARMACY CONSULT  CARDIAC REHAB IP CONSULT  NUTRITION SERVICES ADULT IP CONSULT  NUTRITION SERVICES ADULT IP CONSULT  PHARMACY TO DOSE WARFARIN  PHARMACY IP CONSULT  OCCUPATIONAL THERAPY ADULT IP CONSULT  SURGERY GENERAL ADULT IP CONSULT  INTERVENTIONAL RADIOLOGY ADULT/PEDS IP CONSULT  SURGERY GENERAL ADULT IP CONSULT  GI LUMINAL ADULT IP CONSULT  VASCULAR ACCESS CARE ADULT IP CONSULT  VASCULAR ACCESS CARE ADULT IP CONSULT  PHARMACY IP CONSULT  PHARMACY TO DOSE WARFARIN  ETHICS IP CONSULT  SPEECH LANGUAGE PATH ADULT IP CONSULT  ENDOCRINE NON-DIABETES ADULT IP CONSULT  WOUND OSTOMY CONTINENCE NURSE  IP CONSULT  VASCULAR ACCESS CARE ADULT IP CONSULT  PHARMACY IP CONSULT  VASCULAR ACCESS ADULT IP CONSULT  INFECTIOUS DISEASE GENERAL ADULT IP CONSULT  SMOKING CESSATION PROGRAM IP CONSULT  SMOKING CESSATION PROGRAM IP CONSULT    Time Spent on this Encounter    I, Jesusita Ayala, personally saw the patient today and spent greater than 30 minutes discharging this patient.    Discharge Orders     INR     Medication Therapy Management Referral     Home care nursing referral     Home infusion referral     Speech Therapy Referral     Reason for your hospital stay   MCA stroke  He came to the hospital because of a stroke and was more sleepy could not speak well and could not eat well. We placed a feeding tube through his nose to feed him. We treated a pneumonia and he is finishing antibiotics for a urinary infection. He should be re evaluated for removing the feeding tube and determining if his lasix and metoprolol should be restarted.     Adult [facility] Follow-up and recommended labs and tests   Follow up with [facility] in the next month.     Appointments on [place] and/or [place] campus (with [facility] or [facility] provider or service). Call [phone] if you haven't heard regarding these appointments within 7 days of discharge.     Activity   Your activity upon discharge: activity as tolerated     Monitor and record   Meals eaten  weight     DNR/DNI     Diet   Follow this diet upon discharge: Orders Placed This Encounter     Calorie Counts     Adult Formula Drip Feeding: Specified Time Nepro; Nasoduodenal tube; Goal Rate: Nepro at 85 mL/hr x 14 hours (6p-8a or timing per pt preference) - See advancement schedule below; mL/hr; Medication - Tube Feeding Flush Frequency: 150ml q4h; Amount ...     Dysphagia Diet Level 1 Pureed Nectar Thickened Liquids (pre-thickened or use instant food thickener)       Discharge Medications   Current Discharge Medication List      START taking these medications    Details   linezolid (ZYVOX) 600 MG tablet 1 tablet (600 mg) by Oral or Feeding Tube route every 12 hours for 3 days  Qty: 6 tablet, Refills: 0    Associated Diagnoses: Acute cystitis without hematuria      protein modular (PROSOURCE TF) 1 packet by Per Feeding Tube route 3  times daily  Qty: 90 packet, Refills: 3    Associated Diagnoses: Cerebrovascular accident (CVA), unspecified mechanism (H)         CONTINUE these medications which have NOT CHANGED    Details   acetaminophen (TYLENOL) 325 MG tablet Take 2 tablets (650 mg) by mouth every 6 hours as needed for mild pain  Qty: 100 tablet, Refills: 0      aspirin 81 MG tablet Take 1 tablet (81 mg) by mouth daily  Qty: 30 tablet, Refills: 3    Associated Diagnoses: Ischemic cardiomyopathy      bisacodyl (DULCOLAX) 10 MG Suppository Place 1 suppository (10 mg) rectally daily as needed for constipation  Qty: 5 suppository, Refills: 1    Associated Diagnoses: Drug-induced constipation      finasteride (PROSCAR) 5 MG tablet Take 1 tablet (5 mg) by mouth daily  Qty: 90 tablet, Refills: 3    Associated Diagnoses: Incomplete bladder emptying      levETIRAcetam (KEPPRA) 100 MG/ML solution Take 7.5 mLs (750 mg) by mouth every 12 hours  Qty: 500 mL, Refills: 3    Associated Diagnoses: Seizure (H)      loratadine (CLARITIN) 10 MG tablet TAKE 1 TABLET (10 MG) BY MOUTH DAILY  Qty: 90 tablet, Refills: 2    Associated Diagnoses: Allergic rhinitis      losartan (COZAAR) 25 MG tablet Take 1 tablet (25 mg) by mouth daily  Qty: 15 tablet, Refills: 3    Associated Diagnoses: Chronic systolic congestive heart failure (H)      nitroGLYcerin (NITROSTAT) 0.4 MG sublingual tablet Place 1 tablet (0.4 mg) under the tongue every 5 minutes as needed for chest pain  Qty: 30 tablet, Refills: 1    Associated Diagnoses: Coronary artery disease involving native coronary artery of native heart with unstable angina pectoris (H)      pantoprazole (PROTONIX) 20 MG EC tablet Take 1 tablet (20 mg) by mouth every morning (before breakfast)  Qty: 30 tablet, Refills: 11    Associated Diagnoses: Gastroesophageal reflux disease with esophagitis      senna-docusate (SENOKOT-S;PERICOLACE) 8.6-50 MG per tablet Take 1-2 tablets by mouth 2 times daily as needed for constipation       simethicone (MYLICON) 80 MG chewable tablet Take 80 mg by mouth every 6 hours as needed for flatulence or cramping      tamsulosin (FLOMAX) 0.4 MG capsule Take 2 capsules (0.8 mg) by mouth daily  Qty: 60 capsule, Refills: 11    Associated Diagnoses: Incomplete bladder emptying      thiamine (VITAMIN B-1) 100 MG tablet Take 1 tablet (100 mg) by mouth daily  Qty: 90 tablet, Refills: 3    Associated Diagnoses: Cerebrovascular accident (CVA) due to embolism of right middle cerebral artery (H)      warfarin (COUMADIN) 1 MG tablet Take 3 tablets (3 mg) by mouth daily Or as directed after INR dosing changes  Qty: 120 tablet, Refills: 11    Associated Diagnoses: LVAD (left ventricular assist device) present (H)      ALEK CONTOUR test strip USE TO TEST BLOOD SUGAR 2 TIMES DAILY OR AS DIRECTED.  Qty: 100 strip, Refills: 2    Associated Diagnoses: Hypoglycemia      blood glucose monitoring (ALEK MICROLET) lancets USE TO TEST BLOOD SUGAR 2 TIMES DAILY OR AS DIRECTED  Qty: 100 each, Refills: 2    Comments: WAO  Associated Diagnoses: Diabetes mellitus, type 2 (H)      blood glucose monitoring (NO BRAND SPECIFIED) lancets Use to test blood sugars 2 times daily or as directed.  Qty: 1 Box, Refills: 3    Associated Diagnoses: Diabetes mellitus, type 2 (H)      Melatonin 10 MG TABS tablet Take 1 tablet (10 mg) by mouth At Bedtime  Qty: 30 tablet, Refills: 11    Associated Diagnoses: Insomnia due to medical condition      !! order for DME Equipment being ordered: Nebulizer  Qty: 1 each, Refills: 11    Associated Diagnoses: Pulmonary atelectasis      !! order for DME Bilateral heel protective cushioning boots.  Dx: previous stroke with left hemiplegia.  Duration of need: 99 months.  Qty: 2 each, Refills: 0    Associated Diagnoses: 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)      !! order for DME Equipment being ordered: Cushioned heel boots.  Qty: 2 each, Refills: 0    Associated Diagnoses: Cerebral  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 (H)      !! order for DME Equipment being ordered: Wheelchair, manual, with elevated leg rests and tilt in space back.  Please fax to [facility]; I called them 11/26/16 and they requested the new order.  Face to face is in my 10/26/16 note.  Qty: 1 each, Refills: 0    Associated Diagnoses: 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); Displaced fracture of right femoral neck, closed, with nonunion, subsequent encounter      !! order for DME Equipment being ordered: Wheelchair, manual.  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 order for DME Equipment being ordered: Hospital Bed, fully electric.  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 order for DME Equipment being ordered: Reclining manual w/c with bilateral elevating leg rests.  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 order for DME Equipment being ordered: Bilateral leg supports for manual wheelchair.  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 ORDER FOR DME Equipment being ordered: Lift chair.    Please see indications and face-to-face encounter details in 2/3/15 Office Visit note.  Qty: 1 Device, Refills: 0    Associated Diagnoses: Fracture of femoral neck, right, closed, initial encounter;  Stroke (H); CHF (congestive heart failure), NYHA class IV (H)       !! - Potential duplicate medications found. Please discuss with provider.      STOP taking these medications       furosemide (LASIX) 20 MG tablet Comments:   Reason for Stopping:         Metoprolol Succinate (TOPROL XL PO) Comments:   Reason for Stopping:         nystatin-triamcinolone (MYCOLOG II) cream Comments:   Reason for Stopping:             Allergies   Allergies   Allergen Reactions     Seasonal Allergies      Data   Most Recent 3 CBC's:  Recent Labs   Lab Test  06/22/18   0656  06/21/18   0546  06/20/18   0516   WBC  24.1*  14.2*  17.4*   HGB  7.5*  7.8*  7.5*   MCV  100  101*  104*   PLT  346  331  261      Most Recent 3 BMP's:  Recent Labs   Lab Test  06/22/18   0656  06/21/18   0546  06/20/18   0516   NA  144  144  142   POTASSIUM  4.0  3.9  4.1   CHLORIDE  116*  115*  115*   CO2  19*  18*  16*   BUN  58*  62*  64*   CR  1.80*  1.73*  1.80*   ANIONGAP  10  11  12   JOSÉ  8.4*  8.5  8.5   GLC  208*  158*  162*     Most Recent 2 LFT's:  Recent Labs   Lab Test  06/06/18   2115  06/02/18   1214   AST  50*  64*   ALT  19  20   ALKPHOS  117  102   BILITOTAL  0.7  0.6     Most Recent INR's and Anticoagulation Dosing History:  Anticoagulation Dose History     Recent Dosing and Labs Latest Ref Rng & Units 6/16/2018 6/17/2018 6/18/2018 6/19/2018 6/20/2018 6/21/2018 6/22/2018    ZZ IMS TEMPLATE - - - - - 7 mg 7 mg -    Warfarin 4 mg - 4 mg - - - - - -    Warfarin 5 mg - - 5 mg 5 mg - - - -    Warfarin 6 mg - - - - 6 mg - - -    INR 0.86 - 1.14 1.37(H) 1.33(H) 1.42(H) 1.49(H) 1.55(H) 1.54(H) 2.11(H)    INR 0.86 - 1.14 - - - - - - -    INR Point of Care 0.86 - 1.14 - - - - - - -    Chromogenic Factor 10 70 - 130 % - - - - - - -    Factor 2 60 - 140 % - - - - - - -        Most Recent 3 Troponin's:  Recent Labs   Lab Test  06/06/18 2116 06/06/18   2115  05/10/18   1045  05/02/18   0049   10/11/14   1821   08/26/14   1124   TROPI   --   <0.015   <0.015  <0.015   < >  <0.015  The 99th percentile for upper reference range is 0.045 ug/L.  Troponin values in   the range of 0.045 - 0.120 ug/L may be associated with risks of adverse   clinical events.   Effective 7/30/2014, the reference range for this assay has changed to reflect   new instrumentation/methodology.     < >   --    TROPONIN  0.01   --    --    --    --   0.00   --   0.00    < > = values in this interval not displayed.     Most Recent Cholesterol Panel:  Recent Labs   Lab Test  06/08/18   0352   CHOL  152   LDL  92   HDL  38*   TRIG  113     Most Recent 6 Bacteria Isolates From Any Culture (See EPIC Reports for Culture Details):  Recent Labs   Lab Test  06/18/18   0015  06/15/18   0814  06/15/18   0808  06/14/18   0920  06/09/18   2043  05/10/18   2138   CULT  Culture received and in progress.  Positive AFB results are called as soon as detected.    Final report to follow in 7 to 8 weeks.    Assayed at [email or web address] [address] [place] [id number] [phone]  Cultured on the 5th day of incubation:  Gram positive cocci in clusters  Susceptibility testing in progress  *  Critical Value/Significant Value, preliminary result only, called to and read back by  Vicenta Huitron (SHAWN) on 6.20.18 @ 6510, .    (Note)  POSITIVE for Staphylococci other than S.aureus, S.epidermidis and  S.lugdunensis, by Verigene multiplex nucleic acid test.  Coagulase-negative staphylococci are the most common venipuncture or  collection associated skin CONTAMINANTS grown in blood cultures.  Final identification and antimicrobial susceptibility testing will be  verified by standard methods.    Specimen tested with Verigene multiplex, gram-positive blood culture  nucleic acid test for the following targets: Staph aureus, Staph  epidermidis, Staph lugdunensis, other Staph species, Enterococcus  faecalis, Enterococcus faecium, Streptococcus species, S. agalactiae,  S. anginosus grp., S. pneumoniae, S. pyogenes,  Listeria sp., mecA  (methicillin resistance) and Mindy/B (vancomycin resistance).    Critical Value/Significant Value called to and read back by Vicenta Huitron RN, @ 7702 6.20.2018 BL    No growth  >100,000 colonies/mL  Enterococcus faecium (VRE)  *  >100,000 colonies/mL  Enterobacter cloacae complex  *  No growth     Most Recent TSH, T4 and A1c Labs:  Recent Labs   Lab Test  06/07/18   0555   08/10/15   2155   TSH   --    --   1.32   A1C  4.7   < >   --     < > = values in this interval not displayed.

## 2018-06-21 NOTE — PLAN OF CARE
Problem: Patient Care Overview  Goal: Plan of Care/Patient Progress Review  RN  1. Pt will be hemodynamically stable.  2. Pt will be free from injury.  3. Labs will be wnl.   Outcome: No Change  Neuro: Unable to assess orientation, unable to follow commands.    Cardiac: SR with PVC. LVAD/heartmate 2, flow frequently unavailable, No alarms overnight.                   Respiratory: Sating >94% on RA, clear/diminished. Frequent, productive cough.   GI/: Adequate urine output via condom cath. Pt experienced 1 episode of moderately/large emesis, which was maroon in color; zofran given x1 and relieved symptoms.    Diet/appetite: DD1 with nectar thick, no intake. Tolerating cycled TF 85 ml/hr with 150ml q4hr flushes via NJ.   Activity:  Assist of 2 with lift.  Pain: Difficult to assess. No nonverbal indicators of pain.   Skin: No new deficits noted.   LDA's: R PIV x2, SL.      Plan: Continue with POC. Notify primary team with changes.  /77 (BP Location: Left arm)  Pulse 95  Temp 97.3  F (36.3  C) (Axillary)  Resp 20  Ht 1.829 m (6')  Wt 84.4 kg (186 lb 1.1 oz)  SpO2 96%  BMI 25.24 kg/m2

## 2018-06-22 NOTE — PLAN OF CARE
Problem: Patient Care Overview  Goal: Plan of Care/Patient Progress Review  RN  1. Pt will be hemodynamically stable.  2. Pt will be free from injury.  3. Labs will be wnl.   Outcome: No Change  Neuro: Unable to assess orientation. Family states pt is alert and vocalizes needs intermittently.      Cardiac: LVAD/heartmate 2, flow frequently unavailable throughout shift, No alarms overnight. HR 's, SR with frequent episodes of trigeminal PVC's.                   Respiratory: Sating >94% on RA, clear/diminished. Frequent, productive cough, requiring frequent oral suctioning.   GI/: Adequate urine output via condom cath. No nausea/vomiting this shift. Multiple BMs, liquid.   Diet/appetite: DD1 with nectar thick, no intake. Per speech, recommends DD2 with thin liquids.Tolerating cycled TF 85 ml/hr with 150ml q4hr flushes via NJ.   Activity:  Assist of 2.  Pain: Difficult to assess. No nonverbal indicators of pain.   Skin: No new deficits noted.   LDA's: R PIV x2, SL.       Plan: Pt rested without any complications. Plan for d/c to home today. Continue with POC. Notify primary team with changes.  /81 (BP Location: Left arm)  Pulse 95  Temp 97.7  F (36.5  C) (Axillary)  Resp 20  Ht 1.829 m (6')  Wt 84.4 kg (186 lb 1.1 oz)  SpO2 98%  BMI 25.24 kg/m2      Problem: Cardiac Disease Comorbidity  Goal: Cardiac Disease  Patient comorbidity will be monitored for signs and symptoms of Cardiac Disease.  Problems will be absent, minimized or managed by discharge/transition of care.   Outcome: No Change  Heartmate 2, HR . No alarms overnight, flow unavailable frequently overnight.

## 2018-06-22 NOTE — PLAN OF CARE
Problem: Patient Care Overview  Goal: Plan of Care/Patient Progress Review  RN  1. Pt will be hemodynamically stable.  2. Pt will be free from injury.  3. Labs will be wnl.   Outcome: Therapy, progress toward functional goals as expected  Discharge Planner SLP   Patient plan for discharge: not stated; daughter reported patient to discharge home today at 1400  Current status: Patient seen for dysphagia treatment with daughter present. Patient initially somnolent, but able to awaken for participation in PO trials of puree, semi-solid, small bite solid and thin liquids. Patient required total feeding assist. Note congested coughing at baseline and occasionally during session which did not appear directly related to swallowing. No overt aspiration signs after PO trials across consistencies. Note reduced oral acceptance at times with extra-oral loss of liquid and slow but functional mastication of semi-solid and puree textures. Note prolonged mastication of solid texture and oral residue remained. Minimal to no oral residue remained with puree or semi-solid texture. Recommend continue dysphagia diet level 2 with thin liquids given 1:1 assist and swallow strategies (fully alert and upright position, small single sips/bites, alternate food/drink, check for oral residue, liquids by spoon only). Provided verbal education and written handouts for education on diet modifications and swallow strategies. Discussed with RN and MD.  Barriers to return to prior living situation: none from ST perspective  Recommendations for discharge: home ST for dysphagia  Rationale for recommendations: continued ST for dysphagia and safe return to baseline diet       Entered by: Ansley Baron 06/22/2018 10:41 AM

## 2018-06-22 NOTE — PLAN OF CARE
Problem: Patient Care Overview  Goal: Plan of Care/Patient Progress Review  RN  1. Pt will be hemodynamically stable.  2. Pt will be free from injury.  3. Labs will be wnl.   /82 (BP Location: Left leg)  Pulse 95  Temp 99.5  F (37.5  C) (Axillary)  Resp 18  Ht 1.829 m (6')  Wt 84.4 kg (186 lb 1.1 oz)  SpO2 95%  BMI 25.24 kg/m2    Neuro: Unable to assess orientation.  Was alert today and interacting with his family, is following commands.   Cardiac: sinus with rates 90-100s, PVCs intermittently. VSS, afebrile.  Respiratory: Sating upper 90s on RA. Coughs up and spits out sputum.  GI/: Adequate urine output via condom cath, incontinent. BM X4, incontinent.  Diet/appetite: Diet advanced today to dysphagia 2 with thin liquids, patient is tolerating this.  Appetite was fair to moderate.  Activity:  Ceiling lift to chair, sat up for 2 hours, tolerated well.  Otherwise bedrest with turns q2h. Family very involved with patient activity.  Pain: At acceptable level on current regimen. Tylenol x2.  Skin: No new deficits noted.  LDA's: LVAD securly in place, no alarms today, dressing change and system check done. PIV x2 saline locked. NJ with cycled tube feeding, education provided to son in anticipation of discharge home tomorrow.  Plan: Will likely discharge home tomorrow afternoon with NJ tube and tube feeding. Continue with POC. Notify primary team with changes.

## 2018-06-22 NOTE — PROGRESS NOTES
Dates of hospitalization: 6/7 to 6/22  Reason for hospitalization: MCA Stroke  Procedures performed: None  Vitamin K or FFP administered? none  Inpatient warfarin doses added to calendar? yes  Medication changes at discharge: Suspension Tylenol, Aspirin, Linzolid, Loratadine, Losartan, Protonix.  Insulin  Warfarin dosing after DC: LVAD coordinator consulted, back to 2mg and 3mg dosing  Patient discharged on Lovenox? no  Next INR date: 6/25  Where is the patient discharging to? (home, TCU, staying locally, etc.): home  Will patient have home care? yes    ANTICOAGULATION FOLLOW-UP CLINIC VISIT    Patient Name:  Sohan Rendon  Date:  6/22/2018  Contact Type:  Telephone    SUBJECTIVE:     Patient Findings     Comments Spoke to INO Mckenzie.  Fingerstick is 2.1 today.  Consulted LVAD coordinator on call.  Placed patient back on dose prior to admission to the hospital.  Attempted to leave a message with Almaz.  Voicemail is not set up.  INO Mckenzie number is [phone], left voicemail with dosing and next INR draw.           OBJECTIVE    INR   Date Value Ref Range Status   06/22/2018 2.11 (H) 0.86 - 1.14 Final     Chromogenic Factor 10   Date Value Ref Range Status   08/10/2014 24 (L) 70 - 130 % Final     Comment:     Therapeutic Range:  A Chromogenic Factor 10 level of approximately 20-40%   inversely correlates with an INR of 2-3 for patients receiving Warfarin.   Chromogenic Factor 10 levels below 20% indicate an INR greater than 3 and   levels above 40% indicate an INR less than 2.       Factor 2 Assay   Date Value Ref Range Status   07/21/2014 25 (L) 60 - 140 % Final     Comment:     Analyte Specific Reagents (ASRs) are used in many laboratory tests necessary   for   standard medical care and generally do not require FDA approval.  This test   was   developed and its performance characteristics determined by [facility].  It has not been cleared or approved by   the [place] Food  and Drug Administration.         ASSESSMENT / PLAN  INR assessment THER    Recheck INR In: 3 DAYS    INR Location [facility]R      Anticoagulation Summary as of 6/22/2018     INR goal    Prior goal 1.8-2.5   Today's INR    Warfarin maintenance plan No maintenance plan   Full warfarin instructions 6/22: 3 mg; 6/23: 3 mg; 6/24: 3 mg   Weekly warfarin total 25.5 mg   Plan last modified Blanca Bah RN (4/5/2018)   Next INR check 6/25/2018   Priority INR   Target end date Indefinite    Indications   LVAD (left ventricular assist device) present [Z95.811]  Long-term (current) use of anticoagulants [Z79.01] [Z79.01]         Anticoagulation Episode Summary     INR check location     Preferred lab     Send INR reminders to [facility]    Comments Goal Range is 1.8-2.3 5/14/18 Restarted ASA 81mg Daily   [facility] comes out to draw INR  Yancy @ [phone]  Daughter Almaz Ph [phone]      Anticoagulation Care Providers     Provider Role Specialty Phone number    Evon Lemons MD Responsible Cardiology [phone]            See the Encounter Report to view Anticoagulation Flowsheet and Dosing Calendar (Go to Encounters tab in chart review, and find the Anticoagulation Therapy Visit)    Left message for INO Mckenzie.  Will follow up on Monday.    James Nickerson RN

## 2018-06-22 NOTE — PLAN OF CARE
Problem: Stroke (Ischemic) (Adult)  Goal: Signs and Symptoms of Listed Potential Problems Will be Absent, Minimized or Managed (Stroke)  Signs and symptoms of listed potential problems will be absent, minimized or managed by discharge/transition of care (reference Stroke (Ischemic) (Adult) CPG).   Outcome: Adequate for Discharge Date Met: 06/22/18  Pt opening eyes spontaneously a few times this shift, responding some to daughter but not to writer. VSS, no LVAD alarms.     Discharge instructions discussed with daughter and paperwork given. All questions answered and [facility] here to bring pt home. Daughter going with transport. Plan for infusion care to meet them at home this afternoon to help set up tube feed. Pt discharged.

## 2018-06-22 NOTE — TELEPHONE ENCOUNTER
See hospital d/c summary. Discharged today back to home with homecare, N-G tube feedings and recent hx another new CVA. Very poor prognosis but family not wanting hospice. Patient has chronic large clot in his LVAD and has thrown multiple clots to brain, 4 over last year, 2 in last month. Due to condition and overall debility he will not get any more surgical proceedures offered including PEG tube. If family calls please see if homecare nurse can come out and should try to tx comfort based care in home if possible.

## 2018-06-23 NOTE — PLAN OF CARE
Problem: Patient Care Overview  Goal: Plan of Care/Patient Progress Review  RN  1. Pt will be hemodynamically stable.  2. Pt will be free from injury.  3. Labs will be wnl.   Speech Language Therapy Discharge Summary    Reason for therapy discharge:    Discharged to home with home therapy.    Progress towards therapy goal(s). See goals on Care Plan in [facility] electronic health record for goal details.  Goals partially met.  Barriers to achieving goals:   discharge from facility.    Therapy recommendation(s):    Continued therapy is recommended.  Rationale/Recommendations:  SLP tx for dypshagia. The patient was on dypshagia diet 2 and thin liquids at the time of hospital discharge.

## 2018-06-25 NOTE — PROGRESS NOTES
This is a recent snapshot of the patient's [facility] medical record.  For current drug dose and complete information and questions, call [phone] or In Basket pool, [facility] home infusion ([id number])  CSN Number:  [id number]

## 2018-06-25 NOTE — MR AVS SNAPSHOT
Sohan [facility]   6/25/2018   Anticoagulation Therapy Visit    Description:  68 year old male   Provider:  Sarah Martinez, RN   [facility]  [facility]           INR as of 6/25/2018     Today's INR 1.30!      Anticoagulation Summary as of 6/25/2018     INR goal    Prior goal 1.8-2.5   Today's INR 1.30!   Full warfarin instructions 6/25: 5 mg; 6/26: 5 mg; 6/27: 3 mg   Next INR check 6/28/2018    Indications   LVAD (left ventricular assist device) present [Z95.811]  Long-term (current) use of anticoagulants [Z79.01] [Z79.01]         June 2018 Details    Sun Mon Tue Wed Thu Fri Sat          1               2                 3               4               5               6               7               8               9                 10               11               12               13               14               15               16                 17               18               19               20               21               22               23                 24               25      5 mg   See details      26      5 mg         27      3 mg         28            29 30                Date Details   06/25 This INR check       Date of next INR:  6/28/2018         How to take your warfarin dose     To take:  3 mg Take 1 of the 3 mg tablets.    To take:  5 mg Take 5 of the 1 mg tablets.

## 2018-06-25 NOTE — PROGRESS NOTES
ANTICOAGULATION FOLLOW-UP CLINIC VISIT    Patient Name:  Sohan Rendon  Date:  6/25/2018  Contact Type:  Telephone    SUBJECTIVE:     Patient Findings     Positives Change in diet/appetite, Antibiotic use or infection    Comments Pt has a NG Tube and is getting feedings through this. Not sure if this is decreasing the INR with the tube feedings. Pt's last dose of Zyvox is tonight. Pt has 1mg tablets, but when pt's INR dropped back in May there was confusion about tablets since pt is use to getting dosing with 3mg tablets. Writer instructed home care nurse to increase ASA 325mg Daily per LVAD protocol. Pt has a history of GI Bleeding.            OBJECTIVE    INR   Date Value Ref Range Status   06/25/2018 1.30  Final     Comment:     Home Care      Chromogenic Factor 10   Date Value Ref Range Status   08/10/2014 24 (L) 70 - 130 % Final     Comment:     Therapeutic Range:  A Chromogenic Factor 10 level of approximately 20-40%   inversely correlates with an INR of 2-3 for patients receiving Warfarin.   Chromogenic Factor 10 levels below 20% indicate an INR greater than 3 and   levels above 40% indicate an INR less than 2.       Factor 2 Assay   Date Value Ref Range Status   07/21/2014 25 (L) 60 - 140 % Final     Comment:     Analyte Specific Reagents (ASRs) are used in many laboratory tests necessary   for   standard medical care and generally do not require FDA approval.  This test   was   developed and its performance characteristics determined by [facility].  It has not been cleared or approved by   the [place] Food and Drug Administration.         ASSESSMENT / PLAN  INR assessment SUB    Recheck INR In: 3 DAYS    INR Location [facility] INR      Anticoagulation Summary as of 6/25/2018     INR goal    Prior goal 1.8-2.5   Today's INR 1.30!   Warfarin maintenance plan No maintenance plan   Full warfarin instructions 6/25: 5 mg; 6/26: 5 mg; 6/27: 3 mg   Weekly warfarin total  25.5 mg   Plan last modified Blanca Bah RN (4/5/2018)   Next INR check 6/28/2018   Priority INR   Target end date Indefinite    Indications   LVAD (left ventricular assist device) present [Z95.811]  Long-term (current) use of anticoagulants [Z79.01] [Z79.01]         Anticoagulation Episode Summary     INR check location     Preferred lab     Send IN[facility] reminders to [facility]    Comments Goal Range is 1.8-2.3 5/14/18 Restarted ASA 81mg Daily   [facility] comes out to draw INR  Yancy @ [phone]  Daughter Almaz Ph [phone]      Anticoagulation Care Providers     Provider Role Specialty Phone number    Evon Lemons MD Responsible Cardiology [phone]            See the Encounter Report to view Anticoagulation Flowsheet and Dosing Calendar (Go to Encounters tab in chart review, and find the Anticoagulation Therapy Visit)    Spoke with INO Mack. Gave them new warfarin recommendation.  No changes in health, medication, or diet. No missed doses, no falls. No signs or symptoms of bleed or clotting.     Sarah Martinez RN             Addendum 6/25/18 Daughter Almaz called reporting being worried about the INR being 1.30. We have never bridged pt in the past with Lovenox due to hx of bleeding. Merari said that pt was getting Heparin in the hospital, but could not find any notes for this since pt was having black tarry stools and spitting up blood while hospitalized. Writer did speak to Armand LVAD Coordinator and was advised that it was ok to just dose pt and increase ASA. Also to advise Almaz not to bring pt into the hospital. Writer called back to Almaz explaining the above info and she communicated understanding. Writer explained that we are watching the pt closely and going to try to dose appropriately. Sarah Martinez RN       6/26/18 ADDENDUM  Patient's daughter phoned inquiring about timing on Aspirin and Warfarin.  Encouraged her to administer Aspirin in morning and Warfarin in  the evening.  Discussed protein and tube feeding.  Patient gets Prosure in his feeding three times a day.  Discussed complexity of her father's care.  Discussed the importance of closely monitoring Sohan.  Did not adjust Coumadin or next INR draw.    James Nickerson RN      6/27/18  Daughter phoned the [facility] to confirm Warfarin and Aspirin dose for today.  Had Almaz repeat back instructions.  Will check at home visit tomorrow.  James Nickerson RN

## 2018-06-25 NOTE — PROGRESS NOTES
[facility] Care Coordination Contact  CC received notification of discharge to home. Discharge occurred on 06/22/18 from [facility]   CC contacted adult daughter Almaz and reviewed the discharge summary.  Member has a follow-up appointment with PCP: No: They will wait for PCP to schedule a home visit.   Member has had a change in condition: Yes: Is on tube feedings now. Not as responsive due to CVA.  Home visit needed: No  Care plan reviewed and updated.  The following home based services PCA, Skilled nurse visits  were resumed.  Transition log completed.   PCP notified of transition back to home via EMR.  Nena Salazar RN  [facility]   [phone]

## 2018-06-25 NOTE — PROGRESS NOTES
This is a recent snapshot of the patient's [facility] medical record.  For current drug dose and complete information and questions, call [phone] or In [facility] pool, [facility] home infusion ([id number])  CSN Number:  [id number]

## 2018-06-26 PROBLEM — R15.9 FULL INCONTINENCE OF FECES: Status: ACTIVE | Noted: 2018-01-01

## 2018-06-26 PROBLEM — R06.03 RESPIRATORY DISTRESS: Status: ACTIVE | Noted: 2018-01-01

## 2018-06-26 NOTE — PROGRESS NOTES
[facility]  Chief Complaint   Patient presents with     RECHECK       HPI:    Sohan Rendon is a 68 year old  (1950), who is being seen today for an episodic care visit at his home. This home visit is medically necessary as an office visit would require ambulance transportor , require excessive physical effort and cause pain .  HPI information obtained from: family/first contact brother and daughter report. Patient recently returned home after prolonged hospitalization after suffering another CVA, new large RT MCA stroke. Acute and/or chronic conditions being managed today:  1. Cerebrovascular accident (CVA) due to embolism of right middle cerebral artery (H) - new deficit, has suppressed LOC and increase in aphasia and dysphagia. Unable to take adequate po. TCU NH eligible due to care needs but family insists on taking care of him at home. Mild improvement since Friday d/c. A little more alert.   2. Oropharyngeal dysphagia - Aspiration risk, has N-G feeding tube on 14 hours/day. Family is giving him some thickened cereal, honey and a little H2O   3. Uses feeding tube - N-G, temporary until he can increase po. PEG not option due to other comorbid conditions- see hosp notes.   4. Chronic systolic congestive heart failure, NYHA class 4 (H) - LVAD dependent, unable to tolerate supine position   5. Type 2 diabetes mellitus with stage 3 chronic kidney disease, without long-term current use of insulin (H) - blood sugars elevated but not out of control, no lows   6. Full incontinence of feces - needing several changes/day, needs and uses chucks- family, PCA care   7. Urinary incontinence without sensory awareness - again using chucks   8. Complication involving left ventricular assist device (LVAD), sequela - has thrombus in LVAD, throwing multiple thombus to head, has suffered multiple CVAs, will continue   9. Long-term (current) use of anticoagulants [Z79.01] - on coumadin INR 2-3   10. Advanced  directives, counseling/discussion - Family wants continued aggressive medicla care at present. OK with DNR and understands he will be offered no more proceedures. Wants him at home, will not consider NH care. Will send him back to hospital if he becomes medically unstable   11. Respiratory distress - acute but reversible at present, high aspiration risk with subsequent reactive airways and loww 02- will need neb with mask, prn O2 and suction equipment with teahing to avoid sending back to hospital          Allergies   Allergen Reactions     Seasonal Allergies        Past Medical, Surgical, Family and Social History reviewed in EPIC today.    Current Outpatient Prescriptions   Medication Sig Dispense Refill     acetaminophen (TYLENOL) 32 mg/mL solution 20.3 mLs (650 mg) by Oral or Feeding Tube route every 6 hours as needed for mild pain 300 mL 3     aspirin 10 mg/mL SUSP 8.1 mLs (81 mg) by Oral or Feeding Tube route daily 400 mL 3     ALEK CONTOUR test strip USE TO TEST BLOOD SUGAR 2 TIMES DAILY OR AS DIRECTED. 100 strip 2     bisacodyl (DULCOLAX) 10 MG Suppository Place 1 suppository (10 mg) rectally daily as needed for constipation 5 suppository 1     blood glucose monitoring (ALEK MICROLET) lancets USE TO TEST BLOOD SUGAR 2 TIMES DAILY OR AS DIRECTED 100 each 2     blood glucose monitoring (NO BRAND SPECIFIED) lancets Use to test blood sugars 2 times daily or as directed. 1 Box 3     insulin isophane human (HUMULIN N PEN) 100 UNIT/ML injection Inject 88 Units Subcutaneous every 24 hours 30 mL 3     levETIRAcetam (KEPPRA) 100 MG/ML solution Take 7.5 mLs (750 mg) by mouth every 12 hours 500 mL 3     loratadine (CLARITIN) 5 MG/5ML syrup 10 mLs (10 mg) by Oral or Feeding Tube route daily 300 mL 3     losartan (COZAAR) 2.5 mg/mL SUSP 10 mLs (25 mg) by Oral or Feeding Tube route daily 400 mL 3     Melatonin 10 MG TABS tablet Take 1 tablet (10 mg) by mouth At Bedtime 30 tablet 11     nitroGLYcerin (NITROSTAT) 0.4 MG  sublingual tablet Place 1 tablet (0.4 mg) under the tongue every 5 minutes as needed for chest pain 30 tablet 1     ondansetron (ZOFRAN ODT) 4 MG ODT tab Take 1-2 tablets (4-8 mg) by mouth every 8 hours as needed for nausea 20 tablet 1     order for DME Equipment being ordered: Nebulizer 1 each 11     order for DME Bilateral heel protective cushioning boots.  Dx: previous stroke with left hemiplegia.  Duration of need: 99 months. 2 each 0     order for DME Equipment being ordered: Cushioned heel boots. 2 each 0     order for DME Equipment being ordered: Wheelchair, manual, with elevated leg rests and tilt in space back.  [facility] I called them 11/26/16 and they requested the new order.  Face to face is in my 10/26/16 note. 1 each 0     order for DME Equipment being ordered: Wheelchair, manual. 1 each 0     order for DME Equipment being ordered: Hospital Bed, fully electric. 1 each 0     order for DME Equipment being ordered: Reclining manual w/c with bilateral elevating leg rests. 1 each 0     order for DME Equipment being ordered: Bilateral leg supports for manual wheelchair. 2 each 0     ORDER FOR DME Equipment being ordered: Lift chair.    Please see indications and face-to-face encounter details in 2/3/15 Office Visit note. 1 Device 0     pantoprazole (PROTONIX) SUSP suspension 10 mLs (20 mg) by Oral or Feeding Tube route 2 times daily 400 mL 3     protein modular (PROSOURCE TF) 1 packet by Per Feeding Tube route 3 times daily 90 packet 3     senna-docusate (SENOKOT-S;PERICOLACE) 8.6-50 MG per tablet Take 1-2 tablets by mouth 2 times daily as needed for constipation       simethicone (MYLICON) 80 MG chewable tablet Take 80 mg by mouth every 6 hours as needed for flatulence or cramping       tamsulosin (FLOMAX) 0.4 MG capsule Take 2 capsules (0.8 mg) by mouth daily 60 capsule 11     thiamine (VITAMIN B-1) 100 MG tablet Take 1 tablet (100 mg) by mouth daily 90 tablet 3     warfarin (COUMADIN) 1 MG  tablet Take 3 tablets (3 mg) by mouth daily Or as directed after INR dosing changes 120 tablet 11       REVIEW OF SYSTEMS:  Unobtainable secondary to cognitive impairment.  and Unobtainable secondary to aphasia.     There were no vitals taken for this visit.  GENERAL APPEARANCE:  somnolent, appears ill, uncooperative  RESP:  respiratory effort and palpation of chest normal, diminished breath sounds bases bilat post, rhonchi throughout, cough productive  CV:  Palpation and auscultation of heart done , no edema, mechanical hum of LVAD  ABDOMEN:  normal bowel sounds, soft, nontender, no hepatosplenomegaly or other masses  M/S:   Gait and station abnormal non ambulatory, flex contractures legs  SKIN:  Inspection of skin and subcutaneous tissue baseline  NEURO:   Lt hemiparesisi, new    Recent Labs:   CBC RESULTS:   Recent Labs   Lab Test  06/22/18   0656  06/21/18   0546   WBC  24.1*  14.2*   RBC  2.55*  2.68*   HGB  7.5*  7.8*   HCT  25.5*  27.1*   MCV  100  101*   MCH  29.4  29.1   MCHC  29.4*  28.8*   RDW  26.0*  26.8*   PLT  346  331       Last Basic Metabolic Panel:  Recent Labs   Lab Test  06/22/18   0656  06/21/18   0546   NA  144  144   POTASSIUM  4.0  3.9   CHLORIDE  116*  115*   JOSÉ  8.4*  8.5   CO2  19*  18*   BUN  58*  62*   CR  1.80*  1.73*   GLC  208*  158*       Liver Function Studies -   Recent Labs   Lab Test  06/06/18   2115  06/02/18   1214   PROTTOTAL  7.5  7.0   ALBUMIN  3.2*  3.0*   BILITOTAL  0.7  0.6   ALKPHOS  117  102   AST  50*  64*   ALT  19  20       TSH   Date Value Ref Range Status   08/10/2015 1.32 0.40 - 4.00 mU/L Final   01/28/2015 0.89 0.40 - 4.00 mU/L Final     Comment:     Effective 7/30/2014, the reference range for this assay has changed to reflect   new instrumentation/methodology.       Lab Results   Component Value Date    A1C 4.7 06/07/2018    A1C 4.8 08/04/2017     Assessment/Plan:  1. Cerebrovascular accident (CVA) due to embolism of right middle cerebral artery (H) - new  deficit, has suppressed LOC and increase in aphasia and dysphagia. Unable to take adequate po. TCU NH eligible due to care needs but family insists on taking care of him at home. Mild improvement since Friday d/c. A little more alert.- cont 24/7 care at home, will contact homecare about increasing visits for a while   2. Oropharyngeal dysphagia - Aspiration risk, has N-G feeding tube on 14 hours/day. Family is giving him some thickened cereal, honey and a little H2O- would like ST stiener to continue to instruct family on safe progression of po   3. Uses feeding tube - N-G, temporary until he can increase po. PEG not option due to other comorbid conditions- see hosp notes.. Hopefully can wean off over next 2 weeks   4. Chronic systolic congestive heart failure, NYHA class 4 (H) - LVAD dependent, unable to tolerate supine position   5. Type 2 diabetes mellitus with stage 3 chronic kidney disease, without long-term current use of insulin (H) - blood sugars elevated but not out of control, no lows, monitor   6. Full incontinence of feces - needing several changes/day, needs and uses chucks- family, PCA care   7. Urinary incontinence without sensory awareness - again using chucks   8. Complication involving left ventricular assist device (LVAD), sequela - has thrombus in LVAD, throwing multiple thombus to head, has suffered multiple CVAs, will continue   9. Long-term (current) use of anticoagulants [Z79.01] - on coumadin INR 2-3   10. Advanced directives, counseling/discussion - Family wants continued aggressive medicla care at present. OK with DNR and understands he will be offered no more proceedures. Wants him at home, will not consider NH care. Will send him back to hospital if he becomes medically unstable   11. Respiratory distress - acute but reversible at present, high aspiration risk with subsequent reactive airways and loww 02- will need neb with mask, prn O2 and suction equipment with teahing to avoid sending  back to hospital- will contact FP and homecare about increasing services         Orders:      Next visit will be scheduled for Friday July 6. 10 am    Time: 65 minutes/> half instructing on care and discussing plan of care with family/patient    Electronically signed by:  Raghu Almodovar MD

## 2018-06-26 NOTE — MR AVS SNAPSHOT
"              After Visit Summary   2018    Sohan Rendon    MRN: [id number]           Patient Information     Date Of Birth          1950        Visit Information        Provider Department      2018 2:30 PM Joann Muse, [facility]        Today's Diagnoses     ERRONEOUS ENCOUNTER--DISREGARD    -  1       Follow-ups after your visit        Who to contact     If you have questions or need follow up information about today's clinic visit or your schedule please contact [facility] [place] at [phone].  Normal or non-critical lab and imaging results will be communicated to you by [employer], letter or phone within 4 business days after the clinic has received the results. If you do not hear from us within 7 days, please contact the clinic through [employer] or phone. If you have a critical or abnormal lab result, we will notify you by phone as soon as possible.  Submit refill requests through [employer] or call your pharmacy and they will forward the refill request to us. Please allow 3 business days for your refill to be completed.          Additional Information About Your Visit        MyChart Information     [employer] lets you send messages to your doctor, view your test results, renew your prescriptions, schedule appointments and more. To sign up, go to [email or web address] . Click on \"Log in\" on the left side of the screen, which will take you to the Welcome page. Then click on \"Sign up Now\" on the right side of the page.     You will be asked to enter the access code listed below, as well as some personal information. Please follow the directions to create your username and password.     Your access code is: [email or web address]  Expires: 2018  3:38 PM     Your access code will  in 90 days. If you need help or a new code, please call your [facility] or [phone].        Care EveryWhere ID     This is your Care " EveryWhere ID. This could be used by other organizations to access your [place] medical records  [id number]         Blood Pressure from Last 3 Encounters:   06/22/18 108/76   05/31/18 108/62   05/16/18 103/78    Weight from Last 3 Encounters:   06/17/18 186 lb 1.1 oz (84.4 kg)   05/31/18 196 lb 13.9 oz (89.3 kg)   05/16/18 190 lb (86.2 kg)              Today, you had the following     No orders found for display       Primary Care Provider Office Phone # Fax #    CLAIRE Rosas [phone]       [address]  [facility] [address]        Equal Access to Services     ALCON SKINNER : Hadii emily matuteo Solillieali, waaxda luqadaha, qaybta kaalmada adeegyada, waxandrea dorman . So [facility] [phone].    ATENCIÓN: Si habla español, tiene a smith disposición servicios gratuitos de asistencia lingüística. Dayron al [phone].    We comply with applicable federal civil rights laws and [place] laws. We do not discriminate on the basis of race, color, national origin, age, disability, sex, sexual orientation, or gender identity.            Thank you!     Thank you for choosing [facility] PRIMARY [facility]ARE [facility]  for your care. Our goal is always to provide you with excellent care. Hearing back from our patients is one way we can continue to improve our services. Please take a few minutes to complete the written survey that you may receive in the mail after your visit with us. Thank you!             Your Updated Medication List - Protect others around you: Learn how to safely use, store and throw away your medicines at www.disposemymeds.org.          This list is accurate as of 6/26/18  3:05 PM.  Always use your most recent med list.                   Brand Name Dispense Instructions for use Diagnosis    acetaminophen 32 mg/mL solution    TYLENOL    300 mL    20.3 mLs (650 mg) by Oral or Feeding Tube route every 6 hours as needed for mild pain    Aphasia        aspirin 10 mg/mL Susp     400 mL    8.1 mLs (81 mg) by Oral or Feeding Tube route daily    Cerebrovascular accident (CVA), unspecified mechanism (H)       ALEK CONTOUR test strip   Generic drug:  blood glucose monitoring     100 strip    USE TO TEST BLOOD SUGAR 2 TIMES DAILY OR AS DIRECTED.    Hypoglycemia       bisacodyl 10 MG Suppository    DULCOLAX    5 suppository    Place 1 suppository (10 mg) rectally daily as needed for constipation    Drug-induced constipation       * blood glucose monitoring lancets     1 Box    Use to test blood sugars 2 times daily or as directed.    Diabetes mellitus, type 2 (H)       * blood glucose monitoring lancets     100 each    USE TO TEST BLOOD SUGAR 2 TIMES DAILY OR AS DIRECTED    Diabetes mellitus, type 2 (H)       insulin isophane human 100 UNIT/ML injection    HumuLIN N PEN    30 mL    Inject 88 Units Subcutaneous every 24 hours    LVAD (left ventricular assist device) present (H)       levETIRAcetam 100 MG/ML solution    KEPPRA    500 mL    Take 7.5 mLs (750 mg) by mouth every 12 hours    Seizure (H)       loratadine 5 MG/5ML syrup    CLARITIN    300 mL    10 mLs (10 mg) by Oral or Feeding Tube route daily    Aphasia       losartan 2.5 mg/mL Susp    COZAAR    400 mL    10 mLs (25 mg) by Oral or Feeding Tube route daily    LVAD (left ventricular assist device) present (H)       Melatonin 10 MG Tabs tablet     30 tablet    Take 1 tablet (10 mg) by mouth At Bedtime    Insomnia due to medical condition       nitroGLYcerin 0.4 MG sublingual tablet    NITROSTAT    30 tablet    Place 1 tablet (0.4 mg) under the tongue every 5 minutes as needed for chest pain    Coronary artery disease involving native coronary artery of native heart with unstable angina pectoris (H)       ondansetron 4 MG ODT tab    ZOFRAN ODT    20 tablet    Take 1-2 tablets (4-8 mg) by mouth every 8 hours as needed for nausea    Cerebrovascular accident (CVA), unspecified mechanism (H), Aphasia       order for  DME     1 Device    Equipment being ordered: Lift chair.  Please see indications and face-to-face encounter details in 2/3/15 Office Visit note.    Fracture of femoral neck, right, closed, initial encounter, Stroke (H), CHF (congestive heart failure), NYHA class IV (H)       * order for DME     2 each    Equipment being ordered: Bilateral leg supports for manual wheelchair.    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  Please fax to [facility]; I called them 11/26/16 and they requested the new order.  Face to face is in my 10/26/16 note.    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  Dx: previous stroke with left hemiplegia.  Duration of need: 99 months.    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)       order for DME     1 each    Equipment being ordered: Nebulizer    Pulmonary atelectasis       pantoprazole Susp suspension    PROTONIX    400 mL    10 mLs (20 mg) by Oral or Feeding Tube route 2 times daily    Cerebrovascular accident (CVA), unspecified mechanism (H), LVAD (left ventricular assist device) present (H)       protein modular     90 packet    1 packet by Per Feeding Tube route 3 times daily    Cerebrovascular accident (CVA), unspecified mechanism (H)       senna-docusate 8.6-50 MG per tablet    SENOKOT-S;PERICOLACE     Take 1-2 tablets by mouth 2 times daily as needed for constipation        simethicone 80 MG chewable tablet    MYLICON     Take 80 mg by mouth every 6 hours as needed for flatulence or cramping        tamsulosin 0.4 MG capsule    FLOMAX    60 capsule    Take 2 capsules (0.8 mg) by mouth daily    Incomplete bladder emptying       thiamine 100 MG tablet     90 tablet    Take 1 tablet (100 mg) by mouth daily    Cerebrovascular accident (CVA) due to embolism of right middle cerebral artery (H)       warfarin 1 MG tablet    COUMADIN    120 tablet    Take 3 tablets (3 mg) by mouth daily Or as directed after INR dosing changes    LVAD (left ventricular assist device) present (H)       * Notice:  This list has 9 medication(s) that are the same as other medications prescribed for you. Read the directions carefully, and ask your doctor or other care provider to review them with you.

## 2018-06-26 NOTE — MR AVS SNAPSHOT
After Visit Summary   6/26/2018    Sohan Rendon    MRN: [id number]           Patient Information     Date Of Birth          1950        Visit Information        Provider Department      6/26/2018 1:00 PM Raghu Almodovar MD [facility]        Today's Diagnoses     Cerebrovascular accident (CVA) due to embolism of right middle cerebral artery (H)    -  1    Oropharyngeal dysphagia        Uses feeding tube        Chronic systolic congestive heart failure, NYHA class 4 (H)        Type 2 diabetes mellitus with stage 3 chronic kidney disease, without long-term current use of insulin (H)        Full incontinence of feces        Urinary incontinence without sensory awareness        Complication involving left ventricular assist device (LVAD), sequela        Long-term (current) use of anticoagulants [Z79.01]        Advanced directives, counseling/discussion        Respiratory distress           Follow-ups after your visit        Your next 10 appointments already scheduled     Jul 06, 2018 10:00 AM CDT   Home Follow Up with Raghu Almodovar MD   [facility] ([facility])    [address]  [facility] [address]  [facility] [address] [phone]              Who to contact     If you have questions or need follow up information about today's clinic visit or your schedule please contact [facility] directly at [phone].  Normal or non-critical lab and imaging results will be communicated to you by MyChart, letter or phone within 4 business days after the clinic has received the results. If you do not hear from us within 7 days, please contact the clinic through MyChart or phone. If you have a critical or abnormal lab result, we will notify you by phone as soon as possible.  Submit refill requests through [employer] or call your pharmacy and they will forward the refill request to us. Please allow 3 business days for your refill to be completed.          Additional  "Information About Your Visit        MyChart Information     [employer] lets you send messages to your doctor, view your test results, renew your prescriptions, schedule appointments and more. To sign up, go to [email or web address] . Click on \"Log in\" on the left side of the screen, which will take you to the Welcome page. Then click on \"Sign up Now\" on the right side of the page.     You will be asked to enter the access code listed below, as well as some personal information. Please follow the directions to create your username and password.     Your access code is: [email or web address]  Expires: 2018  3:38 PM     Your access code will  in 90 days. If you need help or a new code, please call your [place] clinic or [phone].        Care EveryWhere ID     This is your Care EveryWhere ID. This could be used by other organizations to access your [place] medical records  [id number]         Blood Pressure from Last 3 Encounters:   18 108/76   18 108/62   18 103/78    Weight from Last 3 Encounters:   18 186 lb 1.1 oz (84.4 kg)   18 196 lb 13.9 oz (89.3 kg)   18 190 lb (86.2 kg)              Today, you had the following     No orders found for display       Primary Care Provider Office Phone # Fax #    Shilpi Agosto [facility]KIT [phone]       [address] [address]        Equal Access to Services     GIUL[facility]NA [facility] : Hadii emily kaur hadasho Soedna, waaxda luqadaha, qaybta kaalmada adeegyaavery, willie dorman . So [facility] [phone].    ATENCIÓN: Si habla español, tiene a smith disposición servicios gratuitos de asistencia lingüística. Llame al [phone].    We comply with applicable federal civil rights laws and [place] laws. We do not discriminate on the basis of race, color, national origin, age, disability, sex, sexual orientation, or gender identity.            Thank you!     Thank you for choosing [facility] "  for your care. Our goal is always to provide you with excellent care. Hearing back from our patients is one way we can continue to improve our services. Please take a few minutes to complete the written survey that you may receive in the mail after your visit with us. Thank you!             Your Updated Medication List - Protect others around you: Learn how to safely use, store and throw away your medicines at www.disposemymeds.org.          This list is accurate as of 6/26/18 11:59 PM.  Always use your most recent med list.                   Brand Name Dispense Instructions for use Diagnosis    acetaminophen 32 mg/mL solution    TYLENOL    300 mL    20.3 mLs (650 mg) by Oral or Feeding Tube route every 6 hours as needed for mild pain    Aphasia       aspirin 10 mg/mL Susp     400 mL    8.1 mLs (81 mg) by Oral or Feeding Tube route daily    Cerebrovascular accident (CVA), unspecified mechanism (H)       ALEK CONTOUR test strip   Generic drug:  blood glucose monitoring     100 strip    USE TO TEST BLOOD SUGAR 2 TIMES DAILY OR AS DIRECTED.    Hypoglycemia       bisacodyl 10 MG Suppository    DULCOLAX    5 suppository    Place 1 suppository (10 mg) rectally daily as needed for constipation    Drug-induced constipation       * blood glucose monitoring lancets     1 Box    Use to test blood sugars 2 times daily or as directed.    Diabetes mellitus, type 2 (H)       * blood glucose monitoring lancets     100 each    USE TO TEST BLOOD SUGAR 2 TIMES DAILY OR AS DIRECTED    Diabetes mellitus, type 2 (H)       insulin isophane human 100 UNIT/ML injection    HumuLIN N PEN    30 mL    Inject 88 Units Subcutaneous every 24 hours    LVAD (left ventricular assist device) present (H)       levETIRAcetam 100 MG/ML solution    KEPPRA    500 mL    Take 7.5 mLs (750 mg) by mouth every 12 hours    Seizure (H)       loratadine 5 MG/5ML syrup    CLARITIN    300 mL    10 mLs (10 mg) by Oral or Feeding Tube route daily    Aphasia        losartan 2.5 mg/mL Susp    COZAAR    400 mL    10 mLs (25 mg) by Oral or Feeding Tube route daily    LVAD (left ventricular assist device) present (H)       Melatonin 10 MG Tabs tablet     30 tablet    Take 1 tablet (10 mg) by mouth At Bedtime    Insomnia due to medical condition       nitroGLYcerin 0.4 MG sublingual tablet    NITROSTAT    30 tablet    Place 1 tablet (0.4 mg) under the tongue every 5 minutes as needed for chest pain    Coronary artery disease involving native coronary artery of native heart with unstable angina pectoris (H)       ondansetron 4 MG ODT tab    ZOFRAN ODT    20 tablet    Take 1-2 tablets (4-8 mg) by mouth every 8 hours as needed for nausea    Cerebrovascular accident (CVA), unspecified mechanism (H), Aphasia       order for DME     1 Device    Equipment being ordered: Lift chair.  Please see indications and face-to-face encounter details in 2/3/15 Office Visit note.    Fracture of femoral neck, right, closed, initial encounter, Stroke (H), CHF (congestive heart failure), NYHA class IV (H)       * order for DME     2 each    Equipment being ordered: Bilateral leg supports for manual wheelchair.    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  Please fax to [facility]; I called them 11/26/16 and they requested the new order.  Face to face is in my 10/26/16 note.    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  Dx: previous stroke with left hemiplegia.  Duration of need: 99 months.    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)       order for DME     1 each    Equipment being ordered: Nebulizer    Pulmonary atelectasis       pantoprazole Susp suspension    PROTONIX    400 mL    10 mLs (20 mg) by Oral or Feeding Tube route 2 times daily    Cerebrovascular accident (CVA), unspecified mechanism (H), LVAD (left ventricular assist device) present (H)       protein modular     90 packet    1 packet by Per Feeding Tube route 3 times daily    Cerebrovascular accident (CVA), unspecified mechanism (H)       senna-docusate 8.6-50 MG per tablet    SENOKOT-S;PERICOLACE     Take 1-2 tablets by mouth 2 times daily as needed for constipation        simethicone 80 MG chewable tablet    MYLICON     Take 80 mg by mouth every 6 hours as needed for flatulence or cramping        tamsulosin 0.4 MG capsule    FLOMAX    60 capsule    Take 2 capsules (0.8 mg) by mouth daily    Incomplete bladder emptying       thiamine 100 MG tablet     90 tablet    Take 1 tablet (100 mg) by mouth daily    Cerebrovascular accident (CVA) due to embolism of right middle  cerebral artery (H)       warfarin 1 MG tablet    COUMADIN    120 tablet    Take 3 tablets (3 mg) by mouth daily Or as directed after INR dosing changes    LVAD (left ventricular assist device) present (H)       * Notice:  This list has 9 medication(s) that are the same as other medications prescribed for you. Read the directions carefully, and ask your doctor or other care provider to review them with you.

## 2018-06-28 NOTE — ED AVS SNAPSHOT
[facility], Emergency Department    [address] [place] [address]    Phone:  [phone]                                       Sohan Rendon   MRN: [id number]    Department:  [facility], Emergency Department   Date of Visit:  6/28/2018           Patient Information     Date Of Birth          1950        Your diagnoses for this visit were:     Cough     Complication involving left ventricular assist device (LVAD), initial encounter     Chronic cough        You were seen by Harinder Tellez MD.        Discharge Instructions       Start antibiotics as directed and follow up in the clinic  Return if any concerns, particularly fevers or difficulty breathing    Your next 10 appointments already scheduled     Jul 06, 2018 10:00 AM CDT   Home Follow Up with Raghu Almodovar MD   [facility] ([facility])    [address] [place] [address] [address]  [facility] [address] [phone]              24 Hour Appointment Hotline       To make an appointment at any [facility], call [phone] ([phone]). If you don't have a family doctor or clinic, we will help you find one. [facility] clinics are conveniently located to serve the needs of you and your family.             Review of your medicines      START taking        Dose / Directions Last dose taken    levofloxacin 25 MG/ML solution   Commonly known as:  LEVAQUIN   Dose:  500 mg   Quantity:  140 mL        20 mLs (500 mg) by Per Feeding Tube route daily for 5 days   Refills:  0          Our records show that you are taking the medicines listed below. If these are incorrect, please call your family doctor or clinic.        Dose / Directions Last dose taken    acetaminophen 32 mg/mL solution   Commonly known as:  TYLENOL   Dose:  650 mg   Quantity:  300 mL        20.3 mLs (650 mg) by Oral or Feeding Tube route every 6 hours as needed for mild pain   Refills:  3        aspirin 10 mg/mL Susp   Dose:  81 mg   Quantity:  400  mL        8.1 mLs (81 mg) by Oral or Feeding Tube route daily   Refills:  3        ALEK CONTOUR test strip   Quantity:  100 strip   Generic drug:  blood glucose monitoring        USE TO TEST BLOOD SUGAR 2 TIMES DAILY OR AS DIRECTED.   Refills:  2        bisacodyl 10 MG Suppository   Commonly known as:  DULCOLAX   Dose:  10 mg   Quantity:  5 suppository        Place 1 suppository (10 mg) rectally daily as needed for constipation   Refills:  1        * blood glucose monitoring lancets   Quantity:  1 Box        Use to test blood sugars 2 times daily or as directed.   Refills:  3        * blood glucose monitoring lancets   Quantity:  100 each        USE TO TEST BLOOD SUGAR 2 TIMES DAILY OR AS DIRECTED   Refills:  2        insulin isophane human 100 UNIT/ML injection   Commonly known as:  HumuLIN N PEN   Dose:  60 Units   Quantity:  30 mL        Inject 60 Units Subcutaneous every 24 hours   Refills:  3        levETIRAcetam 100 MG/ML solution   Commonly known as:  KEPPRA   Dose:  750 mg   Quantity:  500 mL        Take 7.5 mLs (750 mg) by mouth every 12 hours   Refills:  3        loratadine 5 MG/5ML syrup   Commonly known as:  CLARITIN   Dose:  10 mg   Quantity:  300 mL        10 mLs (10 mg) by Oral or Feeding Tube route daily   Refills:  3        losartan 2.5 mg/mL Susp   Commonly known as:  COZAAR   Dose:  25 mg   Quantity:  400 mL        10 mLs (25 mg) by Oral or Feeding Tube route daily   Refills:  3        Melatonin 10 MG Tabs tablet   Dose:  10 mg   Quantity:  30 tablet        Take 1 tablet (10 mg) by mouth At Bedtime   Refills:  11        nitroGLYcerin 0.4 MG sublingual tablet   Commonly known as:  NITROSTAT   Dose:  0.4 mg   Quantity:  30 tablet        Place 1 tablet (0.4 mg) under the tongue every 5 minutes as needed for chest pain   Refills:  1        ondansetron 4 MG ODT tab   Commonly known as:  ZOFRAN ODT   Dose:  4-8 mg   Quantity:  20 tablet        Take 1-2 tablets (4-8 mg) by mouth every 8 hours as needed  for nausea   Refills:  1        order for DME   Quantity:  1 Device        Equipment being ordered: Lift chair.  Please see indications and face-to-face encounter details in 2/3/15 Office Visit note.   Refills:  0        * order for DME   Quantity:  2 each        Equipment being ordered: Bilateral leg supports for manual wheelchair.   Refills:  0        * order for DME   Quantity:  1 each        Equipment being ordered: Reclining manual w/c with bilateral elevating leg rests.   Refills:  0        * order for DME   Quantity:  1 each        Equipment being ordered: Hospital Bed, fully electric.   Refills:  0        * order for DME   Quantity:  1 each        Equipment being ordered: Wheelchair, manual.   Refills:  0        * order for DME   Quantity:  1 each        Equipment being ordered: Wheelchair, manual, with elevated leg rests and tilt in space back.  Please fax to [facility]; I called them 11/26/16 and they requested the new order.  Face to face is in my 10/26/16 note.   Refills:  0        * order for DME   Quantity:  2 each        Equipment being ordered: Cushioned heel boots.   Refills:  0        * order for DME   Quantity:  2 each        Bilateral heel protective cushioning boots.  Dx: previous stroke with left hemiplegia.  Duration of need: 99 months.   Refills:  0        order for DME   Quantity:  1 each        Equipment being ordered: Nebulizer   Refills:  11        pantoprazole 2 mg/mL Susp suspension   Commonly known as:  PROTONIX   Dose:  20 mg   Quantity:  400 mL        10 mLs (20 mg) by Oral or Feeding Tube route 2 times daily   Refills:  3        protein modular   Dose:  1 packet   Quantity:  90 packet        1 packet by Per Feeding Tube route 3 times daily   Refills:  3        senna-docusate 8.6-50 MG per tablet   Commonly known as:  SENOKOT-S;PERICOLACE   Dose:  1-2 tablet        Take 1-2 tablets by mouth 2 times daily as needed for constipation   Refills:  0        simethicone 80 MG chewable tablet    Commonly known as:  MYLICON   Dose:  80 mg        Take 80 mg by mouth every 6 hours as needed for flatulence or cramping   Refills:  0        tamsulosin 0.4 MG capsule   Commonly known as:  FLOMAX   Dose:  0.8 mg   Quantity:  60 capsule        Take 2 capsules (0.8 mg) by mouth daily   Refills:  11        thiamine 100 MG tablet   Dose:  100 mg   Quantity:  90 tablet        Take 1 tablet (100 mg) by mouth daily   Refills:  3        warfarin 1 MG tablet   Commonly known as:  COUMADIN   Dose:  3 mg   Quantity:  120 tablet        Take 3 tablets (3 mg) by mouth daily Or as directed after INR dosing changes   Refills:  11        * Notice:  This list has 9 medication(s) that are the same as other medications prescribed for you. Read the directions carefully, and ask your doctor or other care provider to review them with you.            Prescriptions were sent or printed at these locations (1 Prescription)                   Other Prescriptions                Printed at Department/Unit printer (1 of 1)         levofloxacin (LEVAQUIN) 25 MG/ML solution                Procedures and tests performed during your visit     CBC with platelets differential    Comprehensive metabolic panel    EKG 12 lead    XR Abdomen Port 1 View    XR Chest 2 Views      Orders Needing Specimen Collection     None      Pending Results     Date and Time Order Name Status Description    6/28/2018 2157 XR Abdomen Port 1 View In process     6/28/2018 2045 XR Chest 2 Views Preliminary             Pending Culture Results     No orders found from 6/26/2018 to 6/29/2018.            Pending Results Instructions     If you had any lab results that were not finalized at the time of your Discharge, you can call the ED Lab Result RN at [phone]. You will be contacted by this team for any positive Lab results or changes in treatment. The nurses are available 7 days a week from 10A to 6:30P.  You can leave a message 24 hours per day and they will return your  "call.        Thank you for choosing [facility]       Thank you for choosing [facility] for your care. Our goal is always to provide you with excellent care. Hearing back from our patients is one way we can continue to improve our services. Please take a few minutes to complete the written survey that you may receive in the mail after you visit with us. Thank you!        [employer] Information     [employer] lets you send messages to your doctor, view your test results, renew your prescriptions, schedule appointments and more. To sign up, go to [email or web address] . Click on \"Log in\" on the left side of the screen, which will take you to the Welcome page. Then click on \"Sign up Now\" on the right side of the page.     You will be asked to enter the access code listed below, as well as some personal information. Please follow the directions to create your username and password.     Your access code is: [email or web address]  Expires: 2018  3:38 PM     Your access code will  in 90 days. If you need help or a new code, please call your [facility] clinic or [phone].        Care EveryWhere ID     This is your Care EveryWhere ID. This could be used by other organizations to access your [facility] medical records  [id number]        Equal Access to Services     ALCON SKINNER : Hadalee matuteo Soedna, waaxda luqadaha, qaybta kaalmada adeegyada, willie quiñonez. So [facility] [phone].    ATENCIÓN: Si habla español, tiene a smith disposición servicios gratuitos de asistencia lingüística. Llame al [phone].    We comply with applicable federal civil rights laws and [place] laws. We do not discriminate on the basis of race, color, national origin, age, disability, sex, sexual orientation, or gender identity.            After Visit Summary       This is your record. Keep this with you and show to your community pharmacist(s) and doctor(s) at your next visit.                  "

## 2018-06-28 NOTE — MR AVS SNAPSHOT
[facility]   6/28/2018   Anticoagulation Therapy Visit    Description:  68 year old male   Provider:  James Nickerson RN   Department:  [facility]           INR as of 6/28/2018     Today's INR 1.3!      Anticoagulation Summary as of 6/28/2018     INR goal    Prior goal 1.8-2.5   Today's INR 1.3!   Full warfarin instructions 6/28: 5 mg; 6/29: 5 mg; 6/30: 5 mg; 7/1: 5 mg   Next INR check 7/2/2018    Indications   LVAD (left ventricular assist device) present [Z95.811]  Long-term (current) use of anticoagulants [Z79.01] [Z79.01]         June 2018 Details    Sun Mon Tue Wed Thu Fri Sat          1               2                 3               4               5               6               7               8               9                 10               11               12               13               14               15               16                 17               18               19               20               21               22               23                 24               25               26               27               28      5 mg   See details      29      5 mg         30      5 mg          Date Details   06/28 This INR check               How to take your warfarin dose     To take:  5 mg Take 1 of the 3 mg tablets and 2 of the 1 mg tablets.           July 2018 Details    Sun Mon Tue Wed Thu Fri Sat     1      5 mg         2            3               4               5               6               7                 8               9               10               11               12               13               14                 15               16               17               18               19               20               21                 22               23               24               25               26               27               28                 29               30               31                    Date Details   No additional details    Date of next  INR:  7/2/2018         How to take your warfarin dose     To take:  5 mg Take 1 of the 3 mg tablets and 2 of the 1 mg tablets.

## 2018-06-28 NOTE — TELEPHONE ENCOUNTER
Called home yesterday pm and talked to daughter about 1 pm. Awaiting home nurse vist. Gave daughter my cell 3 and asked that home care nurse call when arrives to see patient. Daughter also reports patient had symptomatic hypoglycemia yesterday about 10 am. BS 55. Responded to juice, honey. Up to 115 after intake. Reviewed tx- looks like LA insulin was started at 88 units when tube feedings began due to high blood sugars. Order says to hold/possibly d/c onec taking po. Family in process of increasing po intake and cutting back on T-F. Instructed to continue monitoring BS and cut back insulin to 60 units. Will need to titrate this down as he weans from T-F. Never received call from home care yesterday. Place call again this am and asked for call back, still waiting.

## 2018-06-28 NOTE — PROGRESS NOTES
This is a recent snapshot of the patient's [facility] medical record.  For current drug dose and complete information and questions, call [phone] or In Basket pool, [facility] ([id number])  CSN Number:  [id number]

## 2018-06-28 NOTE — ED AVS SNAPSHOT
[facility], [place], Emergency Department    [address] [address]    Phone:  [phone]                                       Sohan Rendon   MRN: [id number]    Department:  [facility], Emergency Department   Date of Visit:  6/28/2018           After Visit Summary Signature Page     I have received my discharge instructions, and my questions have been answered. I have discussed any challenges I see with this plan with the nurse or doctor.    ..........................................................................................................................................  Patient/Patient Representative Signature      ..........................................................................................................................................  Patient Representative Print Name and Relationship to Patient    ..................................................               ................................................  Date                                            Time    ..........................................................................................................................................  Reviewed by Signature/Title    ...................................................              ..............................................  Date                                                            Time

## 2018-06-28 NOTE — PROGRESS NOTES
ANTICOAGULATION FOLLOW-UP CLINIC VISIT    Patient Name:  Sohan Rendon  Date:  6/28/2018  Contact Type:  Telephone    SUBJECTIVE:     Patient Findings     Positives Unexplained INR or factor level change    Comments Spoke to home care and patient's family.  The family was inquiring about Lovenox and Heparin.  As per previous notes, informed them that the recommendation from the LVAD team and providers was to dose patient up and not administer injections.  Patient has a history of bleeding.  Encounter is complicated by requests of family and home care mediation.  Recommended 5mg daily.  Coumadin is crushed and administered in tube.  Aspirin continues at 325mg daily.           OBJECTIVE    INR   Date Value Ref Range Status   06/28/2018 1.3  Final     Comment:     Home Care INR draw     Chromogenic Factor 10   Date Value Ref Range Status   08/10/2014 24 (L) 70 - 130 % Final     Comment:     Therapeutic Range:  A Chromogenic Factor 10 level of approximately 20-40%   inversely correlates with an INR of 2-3 for patients receiving Warfarin.   Chromogenic Factor 10 levels below 20% indicate an INR greater than 3 and   levels above 40% indicate an INR less than 2.       Factor 2 Assay   Date Value Ref Range Status   07/21/2014 25 (L) 60 - 140 % Final     Comment:     Analyte Specific Reagents (ASRs) are used in many laboratory tests necessary   for   standard medical care and generally do not require FDA approval.  This test   was   developed and its performance characteristics determined by [facility].  It has not been cleared or approved by   the US Food and Drug Administration.         ASSESSMENT / PLAN  INR assessment SUB    Recheck INR In: 4 DAYS    INR Location [facility] INR      Anticoagulation Summary as of 6/28/2018     INR goal    Prior goal 1.8-2.5   Today's INR 1.3!   Warfarin maintenance plan No maintenance plan   Full warfarin instructions 6/28: 5 mg; 6/29: 5  mg; 6/30: 5 mg; 7/1: 5 mg   Weekly warfarin total 25.5 mg   Plan last modified Blanca Bah RN (4/5/2018)   Next INR check 7/2/2018   Priority INR   Target end date Indefinite    Indications   LVAD (left ventricular assist device) present [Z95.811]  Long-term (current) use of anticoagulants [Z79.01] [Z79.01]         Anticoagulation Episode Summary     INR check location     Preferred lab     Send INR reminders to [facility]    Comments Goal Range is 1.8-2.3 5/14/18 Restarted ASA 81mg Daily   [facility] comes out to draw INR  Yancy @ [phone]  Daughter Almaz Ph [phone]      Anticoagulation Care Providers     Provider Role Specialty Phone number    Evon Lemons MD Responsible Cardiology [phone]            See the Encounter Report to view Anticoagulation Flowsheet and Dosing Calendar (Go to Encounters tab in chart review, and find the Anticoagulation Therapy Visit)    Spoke to home care nurse and family.    James Nickerson, RN

## 2018-06-29 NOTE — ED TRIAGE NOTES
Coming in with a clear productive cough clear. Family noticed SOA today. Abdominal distention unknown last bm. + lvad and feeding tube.

## 2018-06-29 NOTE — TELEPHONE ENCOUNTER
finasteride (PROSCAR) tablet 5 mg    Last Written Prescription Date:  unknown  Last Fill Quantity: unknown,   # refills: unknown  Last Office Visit : 6/22/17  Future Office visit: none    Routing  Because:  Med end date  6/12/18

## 2018-06-29 NOTE — PROGRESS NOTES
D:  Received call from pt's daughter Almaz to On call VAD Coordinator regarding pt's coughing.  She states pt has been coughing the last 2 days and it has increased today.  She says she is worried the patient's FT may have migrated and that he is aspirating.  She feels that is breathing is faster and states he is coughing up clear to white mucous.  She says she thinks he should go to the ER to have an Xray to see if the FT is still in the right spot.  She states she understands he has a clot in his pump and there is nothing that can be done about that.  She wants to get an xray and then they can go home.  I:  Discussed possibility of pt seeing PCP on Friday vs going to ED this evening.  A:  Almaz states she will discuss w/ her sister.  P:  Pt's daughter to call back if she has more questions or concerns.

## 2018-06-29 NOTE — ED PROVIDER NOTES
[place] EMERGENCY DEPARTMENT ([facility])  6/28/18   ED 8   9:52 PM   History     Chief Complaint   Patient presents with     Cough     The history is provided by medical records and a relative. The history is limited by the condition of the patient (Aphasia).     Sohan Rendon is a 68 year old male with history of ischemic cardiomyopathy status post destination therapy LVAD placement on anticoagulation complicated by bleeding who presents with cough for the past 2 days, worsening today with concerns for migrated feeding tube and aspiration. He also has a history of right MCA stroke with left hemiparesis in 2013, congestive heart failure, right inferior cerebellar stroke in 2014, prior MI.  He had a recent lengthy hospitalization from 6/6-6/22/18.  At that time he presented to the ER with sudden onset altered mental status and absence of movement of his left upper and lower leg.  His imaging showed left parietal ischemic infarct.  He was admitted to the neuro critical ICU.  The source of the clot was felt to be due to LVAD thrombus.  He was found to be aphasic and had failed his swallow studies, had feeding tube placed.  Patient was discharged home.  Over the past couple of days family has noticed that he has been coughing quite a bit.  The coughing worsened today and was productive of sputum.  There daughter is concerned that his NG tube became dislodged and he may have aspirated.  The contacted clinic and decided to come in for chest x-ray to check NG tube placement. They also noticed his abdomen appears to bloating more. Over the past day he seemed more short of breath. Last bowel movement was this morning at 4am. He has been passing flatus here and there. He has been on diuretics in the past but has not been on this lately.     I have reviewed the Medications, Allergies, Past Medical and Surgical History, and Social History in the [facility] system.  PAST MEDICAL HISTORY:   Past Medical History:    Diagnosis Date     Acute right MCA stroke (H) 6/2013    With L sided hemiparesis      Anemia of chronic disease     Baseline Hb 8-9     BPH (benign prostatic hyperplasia)      CHF (congestive heart failure), NYHA class IV (H) 10/9/2012    s/p HeartMate II.  Was on milrinone and dobutamine prior to LVAD      CKD (chronic kidney disease)     Baseline Cr=     Clostridium difficile colitis 12/2012      Dysphagia     PEG tube placed in 2012.  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  Normal carotid duplex 3/2014.       Esophagitis 12/2012    EGD with esophagitis and multiple douenal ulcers     Fracture of femoral neck, right, closed (H) 2/3/2015    Presumed pathologic as patient is non-weight-bearing and suffered no trauma.  Family declined operative repair during hospital stay 1/23 - 1/30/15.     Gastric and duodenal angiodysplasia with hemorrhage 6/18/2015     GERD (gastroesophageal reflux disease)      Gout      Hematuria      HTN (hypertension)     LDL=59 (3/29/2014)     Hyperlipaemia      Ischemic cardiomyopathy      Mitral regurgitation     s/p MVR with Carbomedics ring      Myocardial infarction 1998    In [facility] without intervention      Nonsenile cataract     BE     PFO (patent foramen ovale)     s/p closure (10/9/2012)     TB lung, latent     s/p 9 months INH in 2012        PAST SURGICAL HISTORY:   Past Surgical History:   Procedure Laterality Date     C CABG, VEIN, FIVE  2001     CATARACT IOL, RT/LT Right 11/19/2015     ENDOSCOPIC RETROGRADE CHOLANGIOPANCREATOGRAM N/A 5/9/2017    Procedure: COMBINED ENDOSCOPIC RETROGRADE CHOLANGIOPANCREATOGRAPHY, PLACE TUBE/STENT;  Endoscopic Retrograde Cholangiopancreatogram, Stent (Zimmon Biliary 7fr 12cm) Placement;  Surgeon: Cristo Grove MD;  Location: [facility] OR     ESOPHAGOSCOPY, GASTROSCOPY, DUODENOSCOPY (EGD), COMBINED N/A 6/10/2015    Procedure: COMBINED  ESOPHAGOSCOPY, GASTROSCOPY, DUODENOSCOPY (EGD);  Surgeon: Edu Jenkins MD;  Location: [facility] GI     ESOPHAGOSCOPY, GASTROSCOPY, DUODENOSCOPY (EGD), COMBINED N/A 6/15/2015    Procedure: COMBINED ESOPHAGOSCOPY, GASTROSCOPY, DUODENOSCOPY (EGD);  Surgeon: Yury Bonner MD;  Location: [facility] OR     H INSERT ICD  2/10/2011    And pacemaker.  Not BiV     INSERT VENTRICULAR ASSIST DEVICE LEFT (HEARTMATE II)  10/9/2012     IR GASTRO JEJUNOSTOMY TUBE PLACEMENT       PHACOEMULSIFICATION CLEAR CORNEA WITH STANDARD INTRAOCULAR LENS IMPLANT Right 11/19/2015    Procedure: PHACOEMULSIFICATION CLEAR CORNEA WITH STANDARD INTRAOCULAR LENS IMPLANT;  Surgeon: Violeta Cosme MD;  Location: [facility] OR     REPAIR PATENT FORAMEN OVALE  10/9/2012     REPAIR VALVE MITRAL  2/9/2012    28 mm Carbomedics 2/3 ring        FAMILY HISTORY:   Family History   Problem Relation Age of Onset     Family History Negative No family hx of      Glaucoma No family hx of      Macular Degeneration No family hx of      Cancer No family hx of      no skin cancer       SOCIAL HISTORY:   Social History   Substance Use Topics     Smoking status: Former Smoker     Smokeless tobacco: Former User     Alcohol use No       Patient's Medications   New Prescriptions    LEVOFLOXACIN (LEVAQUIN) 25 MG/ML SOLUTION    20 mLs (500 mg) by Per Feeding Tube route daily for 5 days   Previous Medications    ACETAMINOPHEN (TYLENOL) 32 MG/ML SOLUTION    20.3 mLs (650 mg) by Oral or Feeding Tube route every 6 hours as needed for mild pain    ASPIRIN 10 MG/ML SUSP    8.1 mLs (81 mg) by Oral or Feeding Tube route daily    ALEK CONTOUR TEST STRIP    USE TO TEST BLOOD SUGAR 2 TIMES DAILY OR AS DIRECTED.    BISACODYL (DULCOLAX) 10 MG SUPPOSITORY    Place 1 suppository (10 mg) rectally daily as needed for constipation    BLOOD GLUCOSE MONITORING (ALEK MICROLET) LANCETS    USE TO TEST BLOOD SUGAR 2 TIMES DAILY OR AS DIRECTED    BLOOD GLUCOSE MONITORING (NO BRAND SPECIFIED) LANCETS    Use to  test blood sugars 2 times daily or as directed.    INSULIN ISOPHANE HUMAN (HUMULIN N PEN) 100 UNIT/ML INJECTION    Inject 60 Units Subcutaneous every 24 hours    LEVETIRACETAM (KEPPRA) 100 MG/ML SOLUTION    Take 7.5 mLs (750 mg) by mouth every 12 hours    LORATADINE (CLARITIN) 5 MG/5ML SYRUP    10 mLs (10 mg) by Oral or Feeding Tube route daily    LOSARTAN (COZAAR) 2.5 MG/ML SUSP    10 mLs (25 mg) by Oral or Feeding Tube route daily    MELATONIN 10 MG TABS TABLET    Take 1 tablet (10 mg) by mouth At Bedtime    NITROGLYCERIN (NITROSTAT) 0.4 MG SUBLINGUAL TABLET    Place 1 tablet (0.4 mg) under the tongue every 5 minutes as needed for chest pain    ONDANSETRON (ZOFRAN ODT) 4 MG ODT TAB    Take 1-2 tablets (4-8 mg) by mouth every 8 hours as needed for nausea    ORDER FOR DME    Equipment being ordered: Lift chair.    Please see indications and face-to-face encounter details in 2/3/15 Office Visit note.    ORDER FOR DME    Equipment being ordered: Bilateral leg supports for manual wheelchair.    ORDER FOR DME    Equipment being ordered: Reclining manual w/c with bilateral elevating leg rests.    ORDER FOR DME    Equipment being ordered: Hospital Bed, fully electric.    ORDER FOR DME    Equipment being ordered: Wheelchair, manual.    ORDER FOR DME    Equipment being ordered: Wheelchair, manual, with elevated leg rests and tilt in space back.  Please fax to [facility]; I called them 11/26/16 and they requested the new order.  Face to face is in my 10/26/16 note.    ORDER FOR DME    Equipment being ordered: Cushioned heel boots.    ORDER FOR DME    Bilateral heel protective cushioning boots.  Dx: previous stroke with left hemiplegia.  Duration of need: 99 months.    ORDER FOR DME    Equipment being ordered: Nebulizer    PANTOPRAZOLE (PROTONIX) SUSP SUSPENSION    10 mLs (20 mg) by Oral or Feeding Tube route 2 times daily    PROTEIN MODULAR (PROSOURCE TF)    1 packet by Per Feeding Tube route 3 times daily    SENNA-DOCUSATE  "(SENOKOT-S;PERICOLACE) 8.6-50 MG PER TABLET    Take 1-2 tablets by mouth 2 times daily as needed for constipation    SIMETHICONE (MYLICON) 80 MG CHEWABLE TABLET    Take 80 mg by mouth every 6 hours as needed for flatulence or cramping    TAMSULOSIN (FLOMAX) 0.4 MG CAPSULE    Take 2 capsules (0.8 mg) by mouth daily    THIAMINE (VITAMIN B-1) 100 MG TABLET    Take 1 tablet (100 mg) by mouth daily    WARFARIN (COUMADIN) 1 MG TABLET    Take 3 tablets (3 mg) by mouth daily Or as directed after INR dosing changes   Modified Medications    No medications on file   Discontinued Medications    No medications on file          Allergies   Allergen Reactions     Seasonal Allergies       Review of Systems   Unable to perform ROS: Patient nonverbal   Constitutional: Negative for fever.   Respiratory: Positive for cough. Negative for shortness of breath.    Gastrointestinal: Positive for abdominal distention.   All other systems reviewed and are negative.      Physical Exam   BP: 101/88  Pulse: 96  Temp: 100  F (37.8  C)  Resp: 16  Height: 172.7 cm (5' 8\")  SpO2: 99 %      Physical Exam   Constitutional: He is oriented to person, place, and time. No distress.   Chronically ill-appearing in no distress   HENT:   Mouth/Throat: Oropharynx is clear and moist.   Neck: Neck supple.   Cardiovascular:   LVAD   Pulmonary/Chest: Effort normal and breath sounds normal.   Abdominal: He exhibits no distension. There is no tenderness.   Musculoskeletal: He exhibits edema.   Neurological: He is alert and oriented to person, place, and time.   Skin: Skin is warm.   Psychiatric: He has a normal mood and affect. His behavior is normal.   Nursing note and vitals reviewed.      ED Course     ED Course     Procedures             EKG Interpretation:      Interpreted by Harinder Tellez  Time reviewed: 2100  Symptoms at time of EKG: cough   Rhythm: normal sinus   Rate: normal  Axis: normal  Ectopy: none  Conduction: normal  ST Segments/ T Waves: " No ST-T wave changes  Q Waves: none  Comparison to prior: Unchanged from 6/7/2018    Clinical Impression: normal EKG          Medications   levofloxacin (LEVAQUIN) tablet 750 mg (not administered)   levofloxacin (LEVAQUIN) solution 750 mg (not administered)            Results for orders placed or performed during the hospital encounter of 06/28/18 (from the past 24 hour(s))   EKG 12 lead   Result Value Ref Range    Interpretation ECG Click View Image link to view waveform and result    CBC with platelets differential   Result Value Ref Range    WBC 10.5 4.0 - 11.0 10e9/L    RBC Count 2.53 (L) 4.4 - 5.9 10e12/L    Hemoglobin 7.4 (L) 13.3 - 17.7 g/dL    Hematocrit 25.1 (L) 40.0 - 53.0 %    MCV 99 78 - 100 fl    MCH 29.2 26.5 - 33.0 pg    MCHC 29.5 (L) 31.5 - 36.5 g/dL    RDW 23.4 (H) 10.0 - 15.0 %    Platelet Count 320 150 - 450 10e9/L    Diff Method Automated Method     % Neutrophils 69.4 %    % Lymphocytes 9.7 %    % Monocytes 8.8 %    % Eosinophils 9.6 %    % Basophils 0.9 %    % Immature Granulocytes 1.6 %    Nucleated RBCs 1 (H) 0 /100    Absolute Neutrophil 7.3 1.6 - 8.3 10e9/L    Absolute Lymphocytes 1.0 0.8 - 5.3 10e9/L    Absolute Monocytes 0.9 0.0 - 1.3 10e9/L    Absolute Eosinophils 1.0 (H) 0.0 - 0.7 10e9/L    Absolute Basophils 0.1 0.0 - 0.2 10e9/L    Abs Immature Granulocytes 0.2 0 - 0.4 10e9/L    Absolute Nucleated RBC 0.1    Comprehensive metabolic panel   Result Value Ref Range    Sodium 141 133 - 144 mmol/L    Potassium 4.0 3.4 - 5.3 mmol/L    Chloride 109 94 - 109 mmol/L    Carbon Dioxide 21 20 - 32 mmol/L    Anion Gap 11 3 - 14 mmol/L    Glucose 126 (H) 70 - 99 mg/dL    Urea Nitrogen 47 (H) 7 - 30 mg/dL    Creatinine 1.50 (H) 0.66 - 1.25 mg/dL    GFR Estimate 47 (L) >60 mL/min/1.7m2    GFR Estimate If Black 56 (L) >60 mL/min/1.7m2    Calcium 8.0 (L) 8.5 - 10.1 mg/dL    Bilirubin Total 0.4 0.2 - 1.3 mg/dL    Albumin 2.4 (L) 3.4 - 5.0 g/dL    Protein Total 7.1 6.8 - 8.8 g/dL    Alkaline Phosphatase  121 40 - 150 U/L    ALT 32 0 - 70 U/L    AST 44 0 - 45 U/L   XR Chest 2 Views    Narrative    Low lung volumes with increased bilateral pleural effusions and  basilar atelectasis and/or consolidation.     *Note: Due to a large number of results and/or encounters for the requested time period, some results have not been displayed. A complete set of results can be found in Results Review.     Medications   levofloxacin (LEVAQUIN) tablet 750 mg (not administered)   levofloxacin (LEVAQUIN) solution 750 mg (not administered)         Assessments & Plan (with Medical Decision Making)     68-year-old male with complex history including LVAD.  He was discharged from inpatient unit about 6 days ago and according to family since that time he has been having a cough, no fevers.  The concern was that perhaps he had aspiration. He is entirely feeding tube dependent.  Here he is not hypoxic, he does not have elevated white count or fever.  His labs are all at baseline.  The chest x-ray is difficult to interpret but read as low lung volumes with increased bilateral pleural effusions and basilar atelectasis and/or consolidation.  Feeding Tube is appropriately placed on abdominal film.   I cannot exclude a aspiration pneumonia but given his good clinical appearance, lack of hypoxia, lack of fever, or lack of white count.  I do not think it is unreasonable to give him a dose of Levaquin and discharge him home to complete 5 days.  He was given a first dose here.  It is the family's preference that he go home as well.    This part of the document was transcribed by Sadaf Richard, Medical Scribe.        I have reviewed the nursing notes.    I have reviewed the findings, diagnosis, plan and need for follow up with the patient.    New Prescriptions    LEVOFLOXACIN (LEVAQUIN) 25 MG/ML SOLUTION    20 mLs (500 mg) by Per Feeding Tube route daily for 5 days       Final diagnoses:   Cough   I, Sadaf Richard, am serving as a trained medical  scribe to document services personally performed by Harinder Tellez MD based on the provider's statements to me on June 28, 2018.  This document has been checked and approved by the attending provider.    I, Harinder Tellez MD, was physically present and have reviewed and verified the accuracy of this note documented by Sadaf Richard, medical scribe.       6/28/2018   [facility], [place], EMERGENCY DEPARTMENT     Harinder Tellez MD  06/28/18 [id number]       Harinder Tellez MD  06/28/18 2421

## 2018-06-29 NOTE — PROGRESS NOTES
[facility] Care Coordination Contact  CC received notification of Emergency Room visit.  ER visit occurred on 06/28/18 at [facility] with Dx of cough.    CC contacted adult daughter Kristen and reviewed the discharge summary.  Member has a follow-up appointment with PCP: Yes: scheduled on 7/6/18  Member has had a change in condition: No, not since his recent hospitalization  New referrals placed: Yes for chux and nebulizer mask.  Home Visit Needed: No [facility] is following.  Care plan reviewed and updated.  PCP notified of ED visit via EMR.  Nena Salazar RN  [facility]   [phone]

## 2018-06-29 NOTE — DISCHARGE INSTRUCTIONS
Start antibiotics as directed and follow up in the clinic  Return if any concerns, particularly fevers or difficulty breathing

## 2018-07-02 NOTE — MR AVS SNAPSHOT
[facility]   7/2/2018   Anticoagulation Therapy Visit    Description:  68 year old male   Provider:  Blanca Bah, RN   Department:  [facility]           INR as of 7/2/2018     Today's INR 1.3!      Anticoagulation Summary as of 7/2/2018     INR goal    Prior goal 1.8-2.5   Today's INR 1.3!   Full warfarin instructions 7/2: 6 mg; 7/3: 6 mg; 7/4: 6 mg   Next INR check 7/5/2018    Indications   LVAD (left ventricular assist device) present [Z95.811]  Long-term (current) use of anticoagulants [Z79.01] [Z79.01]         July 2018 Details    Sun Mon Tue Wed Thu Fri Sat     1               2      6 mg   See details      3      6 mg         4      6 mg         5            6               7                 8               9               10               11               12               13               14                 15               16               17               18               19               20               21                 22               23               24               25               26               27               28                 29               30               31                    Date Details   07/02 This INR check       Date of next INR:  7/5/2018         How to take your warfarin dose     To take:  6 mg Take 2 of the 3 mg tablets.

## 2018-07-02 NOTE — PROGRESS NOTES
ANTICOAGULATION FOLLOW-UP CLINIC VISIT    Patient Name:  Sohan Rendon  Date:  7/2/2018  Contact Type:  Telephone    SUBJECTIVE:     Patient Findings     Comments Frances reports that patient was starting on tube feedings over night since last hospitalization 6/22/18.  This could be the reason the INR's have been low.  Patient will continue with ASA 325mg daily.            OBJECTIVE    INR   Date Value Ref Range Status   07/02/2018 1.3  Final     Chromogenic Factor 10   Date Value Ref Range Status   08/10/2014 24 (L) 70 - 130 % Final     Comment:     Therapeutic Range:  A Chromogenic Factor 10 level of approximately 20-40%   inversely correlates with an INR of 2-3 for patients receiving Warfarin.   Chromogenic Factor 10 levels below 20% indicate an INR greater than 3 and   levels above 40% indicate an INR less than 2.       Factor 2 Assay   Date Value Ref Range Status   07/21/2014 25 (L) 60 - 140 % Final     Comment:     Analyte Specific Reagents (ASRs) are used in many laboratory tests necessary   for   standard medical care and generally do not require FDA approval.  This test   was   developed and its performance characteristics determined by [facility].  It has not been cleared or approved by   the US Food and Drug Administration.         ASSESSMENT / PLAN  No question data found.  Anticoagulation Summary as of 7/2/2018     INR goal    Prior goal 1.8-2.5   Today's INR 1.3!   Warfarin maintenance plan No maintenance plan   Full warfarin instructions 7/2: 6 mg; 7/3: 6 mg; 7/4: 6 mg   Weekly warfarin total 25.5 mg   Plan last modified Blanca Bah RN (4/5/2018)   Next INR check 7/5/2018   Priority INR   Target end date Indefinite    Indications   LVAD (left ventricular assist device) present [Z95.811]  Long-term (current) use of anticoagulants [Z79.01] [Z79.01]         Anticoagulation Episode Summary     INR check location     Preferred lab     Send INR  reminders to [facility]    Comments Goal Range is 1.8-2.3 5/14/18 Restarted ASA 81mg Daily   [facility] comes out to draw INR  Yancy @ [phone]  Daughter Almaz Santiago [phone]      Anticoagulation Care Providers     Provider Role Specialty Phone number    Evon Lemons MD Responsible Cardiology [phone]            See the Encounter Report to view Anticoagulation Flowsheet and Dosing Calendar (Go to Encounters tab in chart review, and find the Anticoagulation Therapy Visit)    Spoke with Frances MCKINNON.     Blanca Bah RN

## 2018-07-03 NOTE — TELEPHONE ENCOUNTER
Mr. Rendon is a DM II on tube feedings.  His daughter Daisha called today to inform us his tube feedings are changing and wanted to know what we should do with his insulin.    I called Zahida at [facility] and she reports Mr. Rendon was getting 2-2.5 boxes of Nepro at 85 cc/hr starting in the evening, ended in 8 or so hours.  They have now today lowered it to just 1 box which will run same rate over 3-4 hours.  Tonight 7/3 being the first night.     Prior Mr. Rendon was getting 60 units of Humulin N q daily, daughter reported they usually gave just after tube feedings started.  They report TID glucoses have been in the 130-145 range lately.     I did explain to her that with the significant changes, it would be difficult to determine how much insulin he will now need, thus we will increase monitoring for a couple days and she is to call back on 5 July with the results of the glucoses.    Plan:  1) Due to 50% drop in calories/tube feedings, will drop insulin 50% for now.  Thus 30 units of Humulin N q daily.  2) Asked her to give the insulin as before 1-2 hours after tube feedings start.   3) Recommended they check glucose 4 hours after feeding is done (if able), and 0800, 1200, 1600 (or pre feeding) as before for now.  4) Call us right away if any glucoses <90 or >300.   5) Otherwise call us July 5th to review results so we can adjust from there.

## 2018-07-03 NOTE — PROGRESS NOTES
This is a recent snapshot of the patient's [facility] medical record.  For current drug dose and complete information and questions, call [phone] or In Basket pool, [facility] ([id number])  CSN Number:  [id number]

## 2018-07-05 NOTE — TELEPHONE ENCOUNTER
[place] [facility] TELEPHONE ENCOUNTER    Chief Complaint   Patient presents with     Refill Request       Sohan Rendon is a 68 year old  (1950), DAUGHTER called today to report: patient out of ASA refill.  Coumadin clinic increased the ASA dose to 325 mg after his last stroke, so pharmacy could not refill until received a script with the dosage change.    ASSESSMENT/PLAN  LVAD (left ventricular assist device) present (H)    - aspirin 325 MG tablet; Take 1 tablet (325 mg) by mouth daily    Chronic congestive heart failure, unspecified congestive heart failure type (H)  Script sent to [facility] pharmacy  - aspirin 325 MG tablet; Take 1 tablet (325 mg) by mouth daily        CLAIRE Poe CNP

## 2018-07-05 NOTE — ED AVS SNAPSHOT
[facility], [place], Emergency Department    [address] [address]    Phone:  [phone]                                       Sohan Rendon   MRN: [id number]    Department:  [facility], Emergency Department   Date of Visit:  7/5/2018           After Visit Summary Signature Page     I have received my discharge instructions, and my questions have been answered. I have discussed any challenges I see with this plan with the nurse or doctor.    ..........................................................................................................................................  Patient/Patient Representative Signature      ..........................................................................................................................................  Patient Representative Print Name and Relationship to Patient    ..................................................               ................................................  Date                                            Time    ..........................................................................................................................................  Reviewed by Signature/Title    ...................................................              ..............................................  Date                                                            Time

## 2018-07-05 NOTE — PROGRESS NOTES
This is a recent snapshot of the patient's [facility] medical record.  For current drug dose and complete information and questions, call [phone] or In Basket pool, [facility] ([id number])  CSN Number:  [id number]

## 2018-07-05 NOTE — PROGRESS NOTES
ANTICOAGULATION FOLLOW-UP CLINIC VISIT    Patient Name:  Sohan Rendon  Date:  7/5/2018  Contact Type:  Telephone    SUBJECTIVE:     Patient Findings     Positives No Problem Findings           OBJECTIVE    INR   Date Value Ref Range Status   07/02/2018 1.3  Final     Chromogenic Factor 10   Date Value Ref Range Status   08/10/2014 24 (L) 70 - 130 % Final     Comment:     Therapeutic Range:  A Chromogenic Factor 10 level of approximately 20-40%   inversely correlates with an INR of 2-3 for patients receiving Warfarin.   Chromogenic Factor 10 levels below 20% indicate an INR greater than 3 and   levels above 40% indicate an INR less than 2.       Factor 2 Assay   Date Value Ref Range Status   07/21/2014 25 (L) 60 - 140 % Final     Comment:     Analyte Specific Reagents (ASRs) are used in many laboratory tests necessary   for   standard medical care and generally do not require FDA approval.  This test   was   developed and its performance characteristics determined by [facility].  It has not been cleared or approved by   the US Food and Drug Administration.         ASSESSMENT / PLAN  INR assessment THER    Recheck INR In: 4 DAYS    INR Location [facility]      Anticoagulation Summary as of 7/5/2018     INR goal    Prior goal 1.8-2.5   Today's INR    Warfarin maintenance plan No maintenance plan   Full warfarin instructions 7/5: 6 mg; 7/6: 6 mg; 7/7: 5 mg; 7/8: 6 mg   Weekly warfarin total 25.5 mg   Plan last modified Blanca Bah RN (4/5/2018)   Next INR check 7/9/2018   Priority INR   Target end date Indefinite    Indications   LVAD (left ventricular assist device) present [Z95.811]  Long-term (current) use of anticoagulants [Z79.01] [Z79.01]         Anticoagulation Episode Summary     INR check location     Preferred lab     Send INR reminders to [facility]    Comments Goal Range is 1.8-2.3 5/14/18 Restarted ASA 81mg Daily   [facility] comes out  to draw INR  Yancy @ [phone]  Daughter Almaz Santiago [phone]      Anticoagulation Care Providers     Provider Role Specialty Phone number    Evon Lemons MD Responsible Cardiology [phone]            See the Encounter Report to view Anticoagulation Flowsheet and Dosing Calendar (Go to Encounters tab in chart review, and find the Anticoagulation Therapy Visit)    Spoke with Home care RN Blanca.  Sohan can decrease his Aspirin to 81mgs daily.    Estuardo Zepeda [facility]

## 2018-07-05 NOTE — MR AVS SNAPSHOT
Sohan [facility]   7/5/2018   Anticoagulation Therapy Visit    Description:  68 year old male   Provider:  Estuardo Zepeda, [facility]   Department:  [facility]           INR as of 7/5/2018     Today's INR       Anticoagulation Summary as of 7/5/2018     INR goal    Prior goal 1.8-2.5   Today's INR    Full warfarin instructions 7/5: 6 mg; 7/6: 6 mg; 7/7: 5 mg; 7/8: 6 mg   Next INR check 7/9/2018    Indications   LVAD (left ventricular assist device) present [Z95.811]  Long-term (current) use of anticoagulants [Z79.01] [Z79.01]         July 2018 Details    Sun Mon Tue Wed Thu Fri Sat     1               2               3               4               5      6 mg   See details      6      6 mg         7      5 mg           8      6 mg         9            10               11               12               13               14                 15               16               17               18               19               20               21                 22               23               24               25               26               27               28                 29               30               31                    Date Details   07/05 This INR check       Date of next INR:  7/9/2018         How to take your warfarin dose     To take:  5 mg Take 5 of the 1 mg tablets.    To take:  6 mg Take 2 of the 3 mg tablets.

## 2018-07-05 NOTE — PROGRESS NOTES
[place] [facility]  Chief Complaint   Patient presents with     RECHECK       HPI:    Sohan Rendon is a 68 year old  (1950), who is being seen today for an episodic care visit at his home. This home visit is medically necessary as an office visit would require ambulance transportor , require excessive physical effort and cause pain .  HPI information obtained from: [place] Epic chart review and family/first contact daughter and son report. Acute and/or chronic conditions being managed today:  1. Respiratory distress - ER visit yesterday for eval after emesis, respiratory distress and CP- eval negative for aspiration and new MI ruled out- suctioned - improved in Er, sent home for f/u. Respirations better today   2. History of ischemic cerebrovascular accident (CVA) with residual deficit - multiple CVAs over last several months, sig lt hemiparesis, cognitive decline and aphasia, dysphagia. No new deficits noted   3. Oropharyngeal dysphagia - improving po intake, soft diet and liquids 3-4 times/day- family has decreased supplemental N-G feedings to 5 hours/day.    4. Acute on chronic systolic congestive heart failure (H) - sig BNP elavation in Er, respirations clear, tolerating off diuretics, likely due to minimal po fluid intake, mild dehydration   5. Complication involving left ventricular assist device (LVAD), subsequent encounter - clot in LVAD, causing multiple emboli to head   6. Long-term (current) use of anticoagulants [Z79.01] - hx bleeding as well. On coumadin, recent increase in coumadin, checking frequently    7. Type 2 diabetes mellitus with hypoglycemia without coma, without long-term current use of insulin (H) - insulin aded with N-G supplement- tapering down as needed due to hypoglycemia. Was 77, then 60, now 30 units/day. -160 range   8. Full incontinence of feces - multiple movements/day   9. Acute cystitis without hematuria - very positive UA in Er, started on Keflex, UC pending    10. Functional quadriplegia (H) - due to multiple CVAs       Allergies   Allergen Reactions     Seasonal Allergies        Past Medical, Surgical, Family and Social History reviewed in EPIC today.    Current Outpatient Prescriptions   Medication Sig Dispense Refill     acetaminophen (TYLENOL) 32 mg/mL solution 20.3 mLs (650 mg) by Oral or Feeding Tube route every 6 hours as needed for mild pain 300 mL 3     aspirin 325 MG tablet Take 1 tablet (325 mg) by mouth daily 120 tablet 3     ALEK CONTOUR test strip USE TO TEST BLOOD SUGAR 2 TIMES DAILY OR AS DIRECTED. 100 strip 2     bisacodyl (DULCOLAX) 10 MG Suppository Place 1 suppository (10 mg) rectally daily as needed for constipation 5 suppository 1     blood glucose monitoring (ALEK MICROLET) lancets USE TO TEST BLOOD SUGAR 2 TIMES DAILY OR AS DIRECTED 100 each 2     blood glucose monitoring (NO BRAND SPECIFIED) lancets Use to test blood sugars 2 times daily or as directed. 1 Box 3     cephalexin (KEFLEX) 250 MG/5ML suspension Take 10 mLs (500 mg) by mouth 4 times daily for 7 days 280 mL 0     finasteride (PROSCAR) 5 MG tablet Take 1 tablet (5 mg) by mouth daily Patient needs to be seen in clinic prior to additional refills. 30 tablet 0     insulin isophane human (HUMULIN N PEN) 100 UNIT/ML injection Inject 60 Units Subcutaneous every 24 hours 30 mL 3     levETIRAcetam (KEPPRA) 100 MG/ML solution Take 7.5 mLs (750 mg) by mouth every 12 hours 500 mL 3     loratadine (CLARITIN) 5 MG/5ML syrup 10 mLs (10 mg) by Oral or Feeding Tube route daily 300 mL 3     losartan (COZAAR) 2.5 mg/mL SUSP 10 mLs (25 mg) by Oral or Feeding Tube route daily 400 mL 3     Melatonin 10 MG TABS tablet Take 1 tablet (10 mg) by mouth At Bedtime 30 tablet 11     nitroGLYcerin (NITROSTAT) 0.4 MG sublingual tablet Place 1 tablet (0.4 mg) under the tongue every 5 minutes as needed for chest pain 30 tablet 1     ondansetron (ZOFRAN ODT) 4 MG ODT tab Take 1-2 tablets (4-8 mg) by mouth every 8  hours as needed for nausea 20 tablet 1     ondansetron (ZOFRAN) 4 MG/5ML solution 5 mLs (4 mg) by Per Feeding Tube route 2 times daily as needed for nausea or vomiting 90 mL 0     order for DME Equipment being ordered: Nebulizer 1 each 11     order for DME Bilateral heel protective cushioning boots.  Dx: previous stroke with left hemiplegia.  Duration of need: 99 months. 2 each 0     order for DME Equipment being ordered: Cushioned heel boots. 2 each 0     order for DME Equipment being ordered: Wheelchair, manual, with elevated leg rests and tilt in space back.  Please fax to [employer]; I called them 11/26/16 and they requested the new order.  Face to face is in my 10/26/16 note. 1 each 0     order for DME Equipment being ordered: Wheelchair, manual. 1 each 0     order for DME Equipment being ordered: Hospital Bed, fully electric. 1 each 0     order for DME Equipment being ordered: Reclining manual w/c with bilateral elevating leg rests. 1 each 0     order for DME Equipment being ordered: Bilateral leg supports for manual wheelchair. 2 each 0     ORDER FOR DME Equipment being ordered: Lift chair.    Please see indications and face-to-face encounter details in 2/3/15 Office Visit note. 1 Device 0     pantoprazole (PROTONIX) SUSP suspension 10 mLs (20 mg) by Oral or Feeding Tube route 2 times daily 400 mL 3     protein modular (PROSOURCE TF) 1 packet by Per Feeding Tube route 3 times daily 90 packet 3     senna-docusate (SENOKOT-S;PERICOLACE) 8.6-50 MG per tablet Take 1-2 tablets by mouth 2 times daily as needed for constipation       simethicone (MYLICON) 80 MG chewable tablet Take 80 mg by mouth every 6 hours as needed for flatulence or cramping       tamsulosin (FLOMAX) 0.4 MG capsule Take 2 capsules (0.8 mg) by mouth daily 60 capsule 11     thiamine (VITAMIN B-1) 100 MG tablet Take 1 tablet (100 mg) by mouth daily 90 tablet 3     warfarin (COUMADIN) 1 MG tablet Take 3 tablets (3 mg) by mouth daily Or as directed  after INR dosing changes 120 tablet 11       REVIEW OF SYSTEMS:  Unobtainable secondary to aphasia.     There were no vitals taken for this visit.  GENERAL APPEARANCE:  Alert, in no distress, uncooperative  RESP:  respiratory effort and palpation of chest normal, lungs clear to auscultation , no respiratory distress, diminished breath sounds bilat bases post  CV:  Palpation and auscultation of heart done , no edema, mechanical hum of LVAD  ABDOMEN:  normal bowel sounds, soft, nontender, no hepatosplenomegaly or other masses, no guarding or rebound  M/S:   functional quad, no meaningful use of extremities  SKIN:  Inspection of skin and subcutaneous tissue baseline  NEURO:   no purposeful movement in upper and lower extremities    Recent Labs:   INR 1.45 July 6 in ER  CBC RESULTS:   Recent Labs   Lab Test  07/05/18 2036 06/28/18   2108   WBC  10.0  10.5   RBC  2.80*  2.53*   HGB  7.8*  7.4*   HCT  27.0*  25.1*   MCV  96  99   MCH  27.9  29.2   MCHC  28.9*  29.5*   RDW  22.3*  23.4*   PLT  209  320       Last Basic Metabolic Panel:  Recent Labs   Lab Test  07/05/18 2036 06/28/18   2108   NA  139  141   POTASSIUM  4.3  4.0   CHLORIDE  112*  109   JOSÉ  8.1*  8.0*   CO2  22  21   BUN  42*  47*   CR  1.77*  1.50*   GLC  135*  126*       Liver Function Studies -   Recent Labs   Lab Test  06/28/18 2108 06/06/18   2115   PROTTOTAL  7.1  7.5   ALBUMIN  2.4*  3.2*   BILITOTAL  0.4  0.7   ALKPHOS  121  117   AST  44  50*   ALT  32  19       TSH   Date Value Ref Range Status   08/10/2015 1.32 0.40 - 4.00 mU/L Final   01/28/2015 0.89 0.40 - 4.00 mU/L Final     Comment:     Effective 7/30/2014, the reference range for this assay has changed to reflect   new instrumentation/methodology.       Lab Results   Component Value Date    A1C 4.7 06/07/2018    A1C 4.8 08/04/2017     Assessment/Plan:    1. Respiratory distress - ER visit yesterday for eval after emesis, respiratory distress and CP- eval negative for aspiration and  new MI ruled out- suctioned - improved in ER, sent home for f/u. Respirations better today- cont monitor, has nebs and suction available for home ues   2. History of ischemic cerebrovascular accident (CVA) with residual deficit - multiple CVAs over last several months, sig lt hemiparesis, cognitive decline and aphasia, dysphagia. No new deficits noted. High risk for further CVAs due to thrombus in LVAD   3. Oropharyngeal dysphagia - improving po intake, soft diet and liquids 3-4 times/day- family has decreased supplemental N-G feedings to 5 hours/day. Discussed with family, N-G very distressing to patient, likely increasing asp risk- With improving po intake recommend pulling N-G tube and feedings. Family in agreement- tube pulled at bedside w/o incident, informed Home infusion   4. Acute on chronic systolic congestive heart failure (H) - sig BNP elavation in ER, respirations clear, tolerating off diuretics, likely due to minimal po fluid intake, mild dehydration, continue off diuretics, monitor- check BMP week of July 16th   5. Complication involving left ventricular assist device (LVAD), subsequent encounter - clot in LVAD, causing multiple emboli to head- cont coumadin as tolerated   6. Long-term (current) use of anticoagulants [Z79.01] - hx bleeding as well. On coumadin, recent increase in coumadin, checking frequently    7. Type 2 diabetes mellitus with hypoglycemia without coma, without long-term current use of insulin (H) - insulin aded with N-G supplement- tapering down as needed due to hypoglycemia. Was 77, then 60, now 30 units/day. -160 range, with d/c of t-f will d/c insulin. Recommend to family hey can d/c glucose monitoring   8. Full incontinence of feces - multiple movements/day, needs more chucks, will order   9. Acute cystitis without hematuria - very positive UA in Er, started on Keflex, UC pending- push po fluids also, f/u after UC results   10. Functional quadriplegia (H) - due to multiple  CVAs- requires 24/7 care     Home care to do BMP lab week of July 16 with one of his INR labs      Next visit will be scheduled for July 18 at 1 pm, please confirm with family.        Electronically signed by:  Raghu Almodovar MD

## 2018-07-05 NOTE — ED AVS SNAPSHOT
[facility], Emergency Department    [address] [facility] [address]    Phone:  [phone]                                       Sohan Rendon   MRN: [id number]    Department:  [facility], Emergency Department   Date of Visit:  7/5/2018           Patient Information     Date Of Birth          1950        Your diagnoses for this visit were:     Acute chest pain     Acute cystitis without hematuria        You were seen by Abilio Bhatt DO.        Discharge Instructions         Understanding Urinary Tract Infections (UTIs)  Most UTIs are caused by bacteria, although they may also be caused by viruses or fungi. Bacteria from the bowel are the most common source of infection. The infection may start because of any of the following:    Sexual activity. During sex, bacteria can travel from the penis, vagina, or rectum into the urethra.     Bacteria on the skin outside the rectum may travel into the urethra. This is more common in women since the rectum and urethra are closer to each other than in men. Wiping from front to back after using the toilet and keeping the area clean can help prevent germs from getting to the urethra.    Blockage of urine flow through the urinary tract. If urine sits too long, germs may start to grow out of control.      Parts of the urinary tract  The infection can occur in any part of the urinary tract.    The kidneys collect and store urine.    The ureters carry urine from the kidneys to the bladder.    The bladder holds urine until you are ready to let it out.    The urethra carries urine from the bladder out of the body. It is shorter in women, so bacteria can move through it more easily. The urethra is longer in men, so a UTI is less likely to reach the bladder or kidneys in men.  Date Last Reviewed: 1/1/2017 2000-2017 [employer]. [address] [place] [address], [place] [address]. All rights reserved. This information is not intended as a substitute  for professional medical care. Always follow your healthcare professional's instructions.       *CHEST PAIN, UNCERTAIN CAUSE    Based on your exam today, the exact cause of your chest pain is not certain. Your condition does not seem serious at this time, and your pain does not appear to be coming from your heart. However, sometimes the signs of a serious problem take more time to appear. Therefore, watch for the warning signs listed below.  HOME CARE:    1. Rest today and avoid strenuous activity.  2. Take any prescribed medicine as directed.  FOLLOW UP with your doctor in 1-3 days.   GET PROMPT MEDICAL ATTENTION if any of the following occur:    A change in the type of pain: if it feels different, becomes more severe, lasts longer, or begins to spread into your shoulder, arm, neck, jaw or back    Shortness of breath or increased pain with breathing    Weakness, dizziness, or fainting    Cough with blood or dark colored sputum (phlegm)    Fever over 101  F (38.3  C)    Swelling, pain or redness in one leg    2380-7263 [employer]. [address] [place] [address]. All rights reserved. This information is not intended as a substitute for professional medical care. Always follow your healthcare professional's instructions.  This information has been modified by your health care provider with permission from the publisher.    Follow-up with your regular doctor for further evaluation.  Return to the emergency department for any new or worsening symptoms.          Your next 10 appointments already scheduled     Jul 06, 2018 10:00 AM CDT   Home Follow Up with [facility])    [address] [place] [id number] [phone]              24 Hour Appointment Hotline       To make an appointment at any [facility], call [phone] ([phone]). If you don't have a family doctor or clinic, we will help you find one.  [facility] are conveniently located to serve the needs of you and your family.             Review of your medicines      START taking        Dose / Directions Last dose taken    cephalexin 250 MG/5ML suspension   Commonly known as:  KEFLEX   Dose:  500 mg   Quantity:  280 mL        Take 10 mLs (500 mg) by mouth 4 times daily for 7 days   Refills:  0          Our records show that you are taking the medicines listed below. If these are incorrect, please call your family doctor or clinic.        Dose / Directions Last dose taken    acetaminophen 32 mg/mL solution   Commonly known as:  TYLENOL   Dose:  650 mg   Quantity:  300 mL        20.3 mLs (650 mg) by Oral or Feeding Tube route every 6 hours as needed for mild pain   Refills:  3        aspirin 325 MG tablet   Dose:  325 mg   Quantity:  120 tablet        Take 1 tablet (325 mg) by mouth daily   Refills:  3        ALEK CONTOUR test strip   Quantity:  100 strip   Generic drug:  blood glucose monitoring        USE TO TEST BLOOD SUGAR 2 TIMES DAILY OR AS DIRECTED.   Refills:  2        bisacodyl 10 MG Suppository   Commonly known as:  DULCOLAX   Dose:  10 mg   Quantity:  5 suppository        Place 1 suppository (10 mg) rectally daily as needed for constipation   Refills:  1        * blood glucose monitoring lancets   Quantity:  1 Box        Use to test blood sugars 2 times daily or as directed.   Refills:  3        * blood glucose monitoring lancets   Quantity:  100 each        USE TO TEST BLOOD SUGAR 2 TIMES DAILY OR AS DIRECTED   Refills:  2        finasteride 5 MG tablet   Commonly known as:  PROSCAR   Dose:  5 mg   Quantity:  30 tablet        Take 1 tablet (5 mg) by mouth daily Patient needs to be seen in clinic prior to additional refills.   Refills:  0        insulin isophane human 100 UNIT/ML injection   Commonly known as:  HumuLIN N PEN   Dose:  60 Units   Quantity:  30 mL        Inject 60 Units Subcutaneous every 24 hours   Refills:  3         levETIRAcetam 100 MG/ML solution   Commonly known as:  KEPPRA   Dose:  750 mg   Quantity:  500 mL        Take 7.5 mLs (750 mg) by mouth every 12 hours   Refills:  3        loratadine 5 MG/5ML syrup   Commonly known as:  CLARITIN   Dose:  10 mg   Quantity:  300 mL        10 mLs (10 mg) by Oral or Feeding Tube route daily   Refills:  3        losartan 2.5 mg/mL Susp   Commonly known as:  COZAAR   Dose:  25 mg   Quantity:  400 mL        10 mLs (25 mg) by Oral or Feeding Tube route daily   Refills:  3        Melatonin 10 MG Tabs tablet   Dose:  10 mg   Quantity:  30 tablet        Take 1 tablet (10 mg) by mouth At Bedtime   Refills:  11        nitroGLYcerin 0.4 MG sublingual tablet   Commonly known as:  NITROSTAT   Dose:  0.4 mg   Quantity:  30 tablet        Place 1 tablet (0.4 mg) under the tongue every 5 minutes as needed for chest pain   Refills:  1        ondansetron 4 MG ODT tab   Commonly known as:  ZOFRAN ODT   Dose:  4-8 mg   Quantity:  20 tablet        Take 1-2 tablets (4-8 mg) by mouth every 8 hours as needed for nausea   Refills:  1        ondansetron 4 MG/5ML solution   Commonly known as:  ZOFRAN   Dose:  4 mg   Quantity:  90 mL        5 mLs (4 mg) by Per Feeding Tube route 2 times daily as needed for nausea or vomiting   Refills:  0        order for DME   Quantity:  1 Device        Equipment being ordered: Lift chair.  Please see indications and face-to-face encounter details in 2/3/15 Office Visit note.   Refills:  0        * order for DME   Quantity:  2 each        Equipment being ordered: Bilateral leg supports for manual wheelchair.   Refills:  0        * order for DME   Quantity:  1 each        Equipment being ordered: Reclining manual w/c with bilateral elevating leg rests.   Refills:  0        * order for DME   Quantity:  1 each        Equipment being ordered: Hospital Bed, fully electric.   Refills:  0        * order for DME   Quantity:  1 each        Equipment being ordered: Wheelchair, manual.    Refills:  0        * order for DME   Quantity:  1 each        Equipment being ordered: Wheelchair, manual, with elevated leg rests and tilt in space back.  Please fax to [facility]; I called them 11/26/16 and they requested the new order.  Face to face is in my 10/26/16 note.   Refills:  0        * order for DME   Quantity:  2 each        Equipment being ordered: Cushioned heel boots.   Refills:  0        * order for DME   Quantity:  2 each        Bilateral heel protective cushioning boots.  Dx: previous stroke with left hemiplegia.  Duration of need: 99 months.   Refills:  0        order for DME   Quantity:  1 each        Equipment being ordered: Nebulizer   Refills:  11        pantoprazole 2 mg/mL Susp suspension   Commonly known as:  PROTONIX   Dose:  20 mg   Quantity:  400 mL        10 mLs (20 mg) by Oral or Feeding Tube route 2 times daily   Refills:  3        protein modular   Dose:  1 packet   Quantity:  90 packet        1 packet by Per Feeding Tube route 3 times daily   Refills:  3        senna-docusate 8.6-50 MG per tablet   Commonly known as:  SENOKOT-S;PERICOLACE   Dose:  1-2 tablet        Take 1-2 tablets by mouth 2 times daily as needed for constipation   Refills:  0        simethicone 80 MG chewable tablet   Commonly known as:  MYLICON   Dose:  80 mg        Take 80 mg by mouth every 6 hours as needed for flatulence or cramping   Refills:  0        tamsulosin 0.4 MG capsule   Commonly known as:  FLOMAX   Dose:  0.8 mg   Quantity:  60 capsule        Take 2 capsules (0.8 mg) by mouth daily   Refills:  11        thiamine 100 MG tablet   Dose:  100 mg   Quantity:  90 tablet        Take 1 tablet (100 mg) by mouth daily   Refills:  3        warfarin 1 MG tablet   Commonly known as:  COUMADIN   Dose:  3 mg   Quantity:  120 tablet        Take 3 tablets (3 mg) by mouth daily Or as directed after INR dosing changes   Refills:  11        * Notice:  This list has 9 medication(s) that are the same as other  medications prescribed for you. Read the directions carefully, and ask your doctor or other care provider to review them with you.            Prescriptions were sent or printed at these locations (1 Prescription)                   Other Prescriptions                Printed at Department/Unit printer (1 of 1)         cephalexin (KEFLEX) 250 MG/5ML suspension                Procedures and tests performed during your visit     Basic metabolic panel    CBC with platelets differential    EKG 12-lead, tracing only    INR    Lactic acid whole blood    Nt probnp inpatient (BNP)    Partial thromboplastin time    Troponin I    Troponin POCT    UA reflex to Microscopic and Culture    Vascular Access Care Adult IP Consult    XR Abdomen 2 Views    XR Chest Port 1 View      Orders Needing Specimen Collection     None      Pending Results     Date and Time Order Name Status Description    7/5/2018 2145 XR Abdomen 2 Views Preliminary             Pending Culture Results     No orders found for last 3 day(s).            Pending Results Instructions     If you had any lab results that were not finalized at the time of your Discharge, you can call the ED Lab Result RN at [phone]. You will be contacted by this team for any positive Lab results or changes in treatment. The nurses are available 7 days a week from 10A to 6:30P.  You can leave a message 24 hours per day and they will return your call.        Thank you for choosing [facility]       Thank you for choosing [facility] for your care. Our goal is always to provide you with excellent care. Hearing back from our patients is one way we can continue to improve our services. Please take a few minutes to complete the written survey that you may receive in the mail after you visit with us. Thank you!        [facility] Information     [employer] lets you send messages to your doctor, view your test results, renew your prescriptions, schedule appointments and more. To sign up, go to  [email or web address] . Click on \"Log in\" on the left side of the screen, which will take you to the Welcome page. Then click on \"Sign up Now\" on the right side of the page.     You will be asked to enter the access code listed below, as well as some personal information. Please follow the directions to create your username and password.     Your access code is: [email or web address]  Expires: 2018  3:38 PM     Your access code will  in 90 days. If you need help or a new code, please call your [facility] clinic or [phone].        Care EveryWhere ID     This is your Care EveryWhere ID. This could be used by other organizations to access your [facility] medical records  [id number]        Equal Access to Services     ALCON SKINNER : Rocky Rahman, jeana morrison, otto rose, willie dorman . So [facility] [phone].    ATENCIÓN: Si habla español, tiene a smith disposición servicios gratuitos de asistencia lingüística. Llame al [phone].    We comply with applicable federal civil rights laws and [place] laws. We do not discriminate on the basis of race, color, national origin, age, disability, sex, sexual orientation, or gender identity.            After Visit Summary       This is your record. Keep this with you and show to your community pharmacist(s) and doctor(s) at your next visit.                  "

## 2018-07-06 PROBLEM — I69.30 HISTORY OF ISCHEMIC CEREBROVASCULAR ACCIDENT (CVA) WITH RESIDUAL DEFICIT: Status: ACTIVE | Noted: 2018-01-01

## 2018-07-06 PROBLEM — I50.23 ACUTE ON CHRONIC SYSTOLIC CONGESTIVE HEART FAILURE (H): Status: ACTIVE | Noted: 2018-01-01

## 2018-07-06 NOTE — ED NOTES
Initial Assessment: VSS. LVAD in use. Family member interpreting. Pt is slow to respond to requests.

## 2018-07-06 NOTE — ED TRIAGE NOTES
"Pt. BIBA for heartburn, nausea, and vomiting that started this evening. Family gave nausea meds and 2 nitro around 1830 when patient started complaining of \"heart\" pain. EMS reports AVSS in route, no meds given. Upon arrival, Pt. A & O x 4, AVSS on RA. Upon arrival pt. Is non verbal toward family, responds to pain. Family reports that he recently has been non verbal. Author was able to get patient awake and he stated that he was in pain.   "

## 2018-07-06 NOTE — DISCHARGE INSTRUCTIONS
Understanding Urinary Tract Infections (UTIs)  Most UTIs are caused by bacteria, although they may also be caused by viruses or fungi. Bacteria from the bowel are the most common source of infection. The infection may start because of any of the following:    Sexual activity. During sex, bacteria can travel from the penis, vagina, or rectum into the urethra.     Bacteria on the skin outside the rectum may travel into the urethra. This is more common in women since the rectum and urethra are closer to each other than in men. Wiping from front to back after using the toilet and keeping the area clean can help prevent germs from getting to the urethra.    Blockage of urine flow through the urinary tract. If urine sits too long, germs may start to grow out of control.      Parts of the urinary tract  The infection can occur in any part of the urinary tract.    The kidneys collect and store urine.    The ureters carry urine from the kidneys to the bladder.    The bladder holds urine until you are ready to let it out.    The urethra carries urine from the bladder out of the body. It is shorter in women, so bacteria can move through it more easily. The urethra is longer in men, so a UTI is less likely to reach the bladder or kidneys in men.  Date Last Reviewed: 1/1/2017 2000-2017 [employer]. [address] [place] [address], [place] [id number]. All rights reserved. This information is not intended as a substitute for professional medical care. Always follow your healthcare professional's instructions.       *CHEST PAIN, UNCERTAIN CAUSE    Based on your exam today, the exact cause of your chest pain is not certain. Your condition does not seem serious at this time, and your pain does not appear to be coming from your heart. However, sometimes the signs of a serious problem take more time to appear. Therefore, watch for the warning signs listed below.  HOME CARE:    1. Rest today and avoid strenuous  activity.  2. Take any prescribed medicine as directed.  FOLLOW UP with your doctor in 1-3 days.   GET PROMPT MEDICAL ATTENTION if any of the following occur:    A change in the type of pain: if it feels different, becomes more severe, lasts longer, or begins to spread into your shoulder, arm, neck, jaw or back    Shortness of breath or increased pain with breathing    Weakness, dizziness, or fainting    Cough with blood or dark colored sputum (phlegm)    Fever over 101  F (38.3  C)    Swelling, pain or redness in one leg    3216-6905 [employer]. [address] [place] [address]. All rights reserved. This information is not intended as a substitute for professional medical care. Always follow your healthcare professional's instructions.  This information has been modified by your health care provider with permission from the publisher.    Follow-up with your regular doctor for further evaluation.  Return to the emergency department for any new or worsening symptoms.

## 2018-07-06 NOTE — MR AVS SNAPSHOT
[facility]   7/6/2018   Anticoagulation Therapy Visit    Description:  68 year old male   Provider:  Sarah Martinez, RN   [facility]           INR as of 7/6/2018     Today's INR 1.45! (7/5/2018)      Anticoagulation Summary as of 7/6/2018     INR goal    Prior goal 1.8-2.5   Today's INR 1.45! (7/5/2018)   Full warfarin instructions 7/6: 7 mg; 7/7: 6 mg; 7/8: 6 mg   Next INR check 7/9/2018    Indications   LVAD (left ventricular assist device) present [Z95.811]  Long-term (current) use of anticoagulants [Z79.01] [Z79.01]         July 2018 Details    Sun Mon Tue Wed Thu Fri Sat     1               2               3               4               5               6      7 mg   See details      7      6 mg           8      6 mg         9            10               11               12               13               14                 15               16               17               18               19               20               21                 22               23               24               25               26               27               28                 29               30               31                    Date Details   07/06 This INR check       Date of next INR:  7/9/2018         How to take your warfarin dose     To take:  6 mg Take 6 of the 1 mg tablets.    To take:  6 mg Take 2 of the 3 mg tablets.    To take:  7 mg Take 2 of the 3 mg tablets and 1 of the 1 mg tablets.

## 2018-07-06 NOTE — MR AVS SNAPSHOT
After Visit Summary   7/6/2018    Sohan Rendon    MRN: [id number]           Patient Information     Date Of Birth          1950        Visit Information        Provider Department      7/6/2018 10:00 AM Raghu Almodovar MD [facility]        Today's Diagnoses     Respiratory distress    -  1    History of ischemic cerebrovascular accident (CVA) with residual deficit        Oropharyngeal dysphagia        Acute on chronic systolic congestive heart failure (H)        Complication involving left ventricular assist device (LVAD), subsequent encounter        Long-term (current) use of anticoagulants [Z79.01]        Type 2 diabetes mellitus with hypoglycemia without coma, without long-term current use of insulin (H)        Full incontinence of feces        Acute cystitis without hematuria        Functional quadriplegia (H)           Follow-ups after your visit        Your next 10 appointments already scheduled     Jul 17, 2018  1:30 PM CDT   Lab with  LAB    [facility] ([facility])    [address]  1st Floor  [place] [facility] [id number] [phone]            Jul 17, 2018  2:00 PM CDT   (Arrive by 1:45 PM)   Ventricular Assist Device with Maureen Grant NP   [facility] ([facility])    [address]  [address]  [place] [place] [id number] [phone]            Jul 18, 2018  1:00 PM CDT   Home Follow Up with MD ERICA Julio [facility] ([facility])    [address]  [address]  [place] [place] [id number] [phone]              Who to contact     If you have questions or need follow up information about today's clinic visit or your schedule please contact [facility] directly at [phone].  Normal or non-critical lab and imaging results will be communicated to you by MyChart, letter or phone within 4 business days after the clinic has received the results. If you  "do not hear from us within 7 days, please contact the clinic through [employer] or phone. If you have a critical or abnormal lab result, we will notify you by phone as soon as possible.  Submit refill requests through [employer] or call your pharmacy and they will forward the refill request to us. Please allow 3 business days for your refill to be completed.          Additional Information About Your Visit        [employer] Information     [employer] lets you send messages to your doctor, view your test results, renew your prescriptions, schedule appointments and more. To sign up, go to [email or web address] . Click on \"Log in\" on the left side of the screen, which will take you to the Welcome page. Then click on \"Sign up Now\" on the right side of the page.     You will be asked to enter the access code listed below, as well as some personal information. Please follow the directions to create your username and password.     Your access code is: [email or web address]  Expires: 2018  3:38 PM     Your access code will  in 90 days. If you need help or a new code, please call your [place] clinic or [phone].        Care EveryWhere ID     This is your Care EveryWhere ID. This could be used by other organizations to access your [place] medical records  [id number]         Blood Pressure from Last 3 Encounters:   18 (!) 85/63   18 (!) 77/59   18 127/85    Weight from Last 3 Encounters:   18 200 lb (90.7 kg)   18 188 lb 15 oz (85.7 kg)   18 186 lb (84.4 kg)              Today, you had the following     No orders found for display       Primary Care Provider Office Phone # Fax #    Shilpi S CLAIRE Agosto [phone]       [address] [address]        Equal Access to Services     [facility] PeninsulaBRENDA : Rocky Rahman, waaxda luqadaha, qaybta kaalmawillie singh. [facility] [phone].    ATENCIÓN: Si habla " español, tiene a smith disposición servicios gratuitos de asistencia lingüística. Dayron smith [phone].    We comply with applicable federal civil rights laws and [place] laws. We do not discriminate on the basis of race, color, national origin, age, disability, sex, sexual orientation, or gender identity.            Thank you!     Thank you for choosing [facility]  for your care. Our goal is always to provide you with excellent care. Hearing back from our patients is one way we can continue to improve our services. Please take a few minutes to complete the written survey that you may receive in the mail after your visit with us. Thank you!             Your Updated Medication List - Protect others around you: Learn how to safely use, store and throw away your medicines at www.disposemymeds.org.          This list is accurate as of 7/6/18 11:59 PM.  Always use your most recent med list.                   Brand Name Dispense Instructions for use Diagnosis    acetaminophen 32 mg/mL solution    TYLENOL    300 mL    20.3 mLs (650 mg) by Oral or Feeding Tube route every 6 hours as needed for mild pain    Aphasia       aspirin 325 MG tablet     120 tablet    Take 1 tablet (325 mg) by mouth daily    LVAD (left ventricular assist device) present (H), Chronic congestive heart failure, unspecified congestive heart failure type (H)       ALEK CONTOUR test strip   Generic drug:  blood glucose monitoring     100 strip    USE TO TEST BLOOD SUGAR 2 TIMES DAILY OR AS DIRECTED.    Hypoglycemia       bisacodyl 10 MG Suppository    DULCOLAX    5 suppository    Place 1 suppository (10 mg) rectally daily as needed for constipation    Drug-induced constipation       * blood glucose monitoring lancets     1 Box    Use to test blood sugars 2 times daily or as directed.    Diabetes mellitus, type 2 (H)       * blood glucose monitoring lancets     100 each    USE TO TEST BLOOD SUGAR 2 TIMES DAILY OR AS DIRECTED    Diabetes mellitus,  type 2 (H)       cephalexin 250 MG/5ML suspension    KEFLEX    280 mL    Take 10 mLs (500 mg) by mouth 4 times daily for 7 days        finasteride 5 MG tablet    PROSCAR    30 tablet    Take 1 tablet (5 mg) by mouth daily Patient needs to be seen in clinic prior to additional refills.    BPH (benign prostatic hyperplasia)       levETIRAcetam 100 MG/ML solution    KEPPRA    500 mL    Take 7.5 mLs (750 mg) by mouth every 12 hours    Seizure (H)       loratadine 5 MG/5ML syrup    CLARITIN    300 mL    10 mLs (10 mg) by Oral or Feeding Tube route daily    Aphasia       losartan 2.5 mg/mL Susp    COZAAR    400 mL    10 mLs (25 mg) by Oral or Feeding Tube route daily    LVAD (left ventricular assist device) present (H)       Melatonin 10 MG Tabs tablet     30 tablet    Take 1 tablet (10 mg) by mouth At Bedtime    Insomnia due to medical condition       nitroGLYcerin 0.4 MG sublingual tablet    NITROSTAT    30 tablet    Place 1 tablet (0.4 mg) under the tongue every 5 minutes as needed for chest pain    Coronary artery disease involving native coronary artery of native heart with unstable angina pectoris (H)       ondansetron 4 MG/5ML solution    ZOFRAN    90 mL    5 mLs (4 mg) by Per Feeding Tube route 2 times daily as needed for nausea or vomiting        order for DME     1 Device    Equipment being ordered: Lift chair.  Please see indications and face-to-face encounter details in 2/3/15 Office Visit note.    Fracture of femoral neck, right, closed, initial encounter, Stroke (H), CHF (congestive heart failure), NYHA class IV (H)       * order for DME     2 each    Equipment being ordered: Bilateral leg supports for manual wheelchair.    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  Please fax to [facility]; I called them 11/26/16 and they requested the new order.  Face to face is in my 10/26/16 note.    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  Dx: previous stroke with left hemiplegia.  Duration of need: 99 months.    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)       order for DME     1 each    Equipment being ordered: Nebulizer    Pulmonary atelectasis       pantoprazole 2 mg/mL Susp suspension    PROTONIX    400 mL    10 mLs (20 mg) by Oral or Feeding Tube route 2 times daily    Cerebrovascular accident (CVA), unspecified mechanism (H), LVAD (left ventricular assist device) present (H)       protein modular     90 packet    1 packet by Per Feeding Tube route 3 times daily    Cerebrovascular accident (CVA), unspecified mechanism (H)        senna-docusate 8.6-50 MG per tablet    SENOKOT-S;PERICOLACE     Take 1-2 tablets by mouth 2 times daily as needed for constipation        simethicone 80 MG chewable tablet    MYLICON     Take 80 mg by mouth every 6 hours as needed for flatulence or cramping        tamsulosin 0.4 MG capsule    FLOMAX    60 capsule    Take 2 capsules (0.8 mg) by mouth daily    Incomplete bladder emptying       thiamine 100 MG tablet     90 tablet    Take 1 tablet (100 mg) by mouth daily    Cerebrovascular accident (CVA) due to embolism of right middle cerebral artery (H)       warfarin 1 MG tablet    COUMADIN    120 tablet    Take 3 tablets (3 mg) by mouth daily Or as directed after INR dosing changes    LVAD (left ventricular assist device) present (H)       * Notice:  This list has 9 medication(s) that are the same as other medications prescribed for you. Read the directions carefully, and ask your doctor or other care provider to review them with you.

## 2018-07-06 NOTE — ED PROVIDER NOTES
History     Chief Complaint   Patient presents with     Chest Pain     HPI  Sohan Rendon is a 68 year old male s/p HM II in 2012 as destination therapy with a course complicated by hemolysis, MVr with carbomedics ring ICD placement in 2011, latent TB, PFO s/p closure in 2012, CKD, and pump thrombosis and multiple strokes with most recent stroke on 6/6 with feeding tube placed since and increased somnolence at baseline since.  The patient presents to the Emergency Department today with his daughter for evaluation of chest pain.  History is obtained from the daughter as the patient is unarousable taking history unobtainable from patient.  The patient's daughter reports that the patient was giving a feeding at 230 and at about 430 the patient had acid reflux resulting in him vomiting.  The patient then complained about chest pain at about 6 PM.  Daughter then brought the patient into the ED for further evaluation.  The patient's daughter also reports that the patient has had a decrease in urine output.  She also notes a increased cough with production.    I have reviewed the Medications, Allergies, Past Medical and Surgical History, and Social History in the [employer] system.    Past Medical History:   Diagnosis Date     Acute right MCA stroke (H) 6/2013    With L sided hemiparesis      Anemia of chronic disease     Baseline Hb 8-9     BPH (benign prostatic hyperplasia)      CHF (congestive heart failure), NYHA class IV (H) 10/9/2012    s/p HeartMate II.  Was on milrinone and dobutamine prior to LVAD      CKD (chronic kidney disease)     Baseline Cr=     Clostridium difficile colitis 12/2012      Dysphagia     PEG tube placed in 2012.  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  Normal carotid duplex 3/2014.       Esophagitis 12/2012    EGD with esophagitis and multiple douenal ulcers     Fracture of femoral neck, right,  closed (H) 2/3/2015    Presumed pathologic as patient is non-weight-bearing and suffered no trauma.  Family declined operative repair during hospital stay 1/23 - 1/30/15.     Gastric and duodenal angiodysplasia with hemorrhage 6/18/2015     GERD (gastroesophageal reflux disease)      Gout      Hematuria      HTN (hypertension)     LDL=59 (3/29/2014)     Hyperlipaemia      Ischemic cardiomyopathy      Mitral regurgitation     s/p MVR with Carbomedics ring      Myocardial infarction 1998    In [facility] without intervention      Nonsenile cataract     BE     PFO (patent foramen ovale)     s/p closure (10/9/2012)     TB lung, latent     s/p 9 months INH in 2012        Past Surgical History:   Procedure Laterality Date     C CABG, VEIN, FIVE  2001     CATARACT IOL, RT/LT Right 11/19/2015     ENDOSCOPIC RETROGRADE CHOLANGIOPANCREATOGRAM N/A 5/9/2017    Procedure: COMBINED ENDOSCOPIC RETROGRADE CHOLANGIOPANCREATOGRAPHY, PLACE TUBE/STENT;  Endoscopic Retrograde Cholangiopancreatogram, Stent (Zimmon Biliary 7fr 12cm) Placement;  Surgeon: Cristo Grove MD;  Location: [facility] OR     ESOPHAGOSCOPY, GASTROSCOPY, DUODENOSCOPY (EGD), COMBINED N/A 6/10/2015    Procedure: COMBINED ESOPHAGOSCOPY, GASTROSCOPY, DUODENOSCOPY (EGD);  Surgeon: Edu Jenkins MD;  Location: [facility] GI     ESOPHAGOSCOPY, GASTROSCOPY, DUODENOSCOPY (EGD), COMBINED N/A 6/15/2015    Procedure: COMBINED ESOPHAGOSCOPY, GASTROSCOPY, DUODENOSCOPY (EGD);  Surgeon: Yury Bonner MD;  Location: [facility] OR     H INSERT ICD  2/10/2011    And pacemaker.  Not BiV     INSERT VENTRICULAR ASSIST DEVICE LEFT (HEARTMATE II)  10/9/2012     IR GASTRO JEJUNOSTOMY TUBE PLACEMENT       PHACOEMULSIFICATION CLEAR CORNEA WITH STANDARD INTRAOCULAR LENS IMPLANT Right 11/19/2015    Procedure: PHACOEMULSIFICATION CLEAR CORNEA WITH STANDARD INTRAOCULAR LENS IMPLANT;  Surgeon: Violeta Cosme MD;  Location: [facility] OR     REPAIR PATENT FORAMEN OVALE  10/9/2012     REPAIR VALVE MITRAL  2/9/2012     28 mm Carbomedics 2/3 ring        Family History   Problem Relation Age of Onset     Family History Negative No family hx of      Glaucoma No family hx of      Macular Degeneration No family hx of      Cancer No family hx of      no skin cancer       Social History   Substance Use Topics     Smoking status: Former Smoker     Smokeless tobacco: Former User     Alcohol use No       Current Facility-Administered Medications   Medication     cephALEXin (KEFLEX) capsule 500 mg     Current Outpatient Prescriptions   Medication     cephalexin (KEFLEX) 250 MG/5ML suspension     acetaminophen (TYLENOL) 32 mg/mL solution     aspirin 325 MG tablet     ALEK CONTOUR test strip     bisacodyl (DULCOLAX) 10 MG Suppository     blood glucose monitoring (ALEK MICROLET) lancets     blood glucose monitoring (NO BRAND SPECIFIED) lancets     finasteride (PROSCAR) 5 MG tablet     insulin isophane human (HUMULIN N PEN) 100 UNIT/ML injection     levETIRAcetam (KEPPRA) 100 MG/ML solution     loratadine (CLARITIN) 5 MG/5ML syrup     losartan (COZAAR) 2.5 mg/mL SUSP     Melatonin 10 MG TABS tablet     nitroGLYcerin (NITROSTAT) 0.4 MG sublingual tablet     ondansetron (ZOFRAN ODT) 4 MG ODT tab     ondansetron (ZOFRAN) 4 MG/5ML solution     order for DME     order for DME     order for DME     order for DME     order for DME     order for DME     order for DME     order for DME     ORDER FOR DME     pantoprazole (PROTONIX) SUSP suspension     protein modular (PROSOURCE TF)     senna-docusate (SENOKOT-S;PERICOLACE) 8.6-50 MG per tablet     simethicone (MYLICON) 80 MG chewable tablet     tamsulosin (FLOMAX) 0.4 MG capsule     thiamine (VITAMIN B-1) 100 MG tablet     warfarin (COUMADIN) 1 MG tablet        Allergies   Allergen Reactions     Seasonal Allergies       Review of Systems   Unable to perform ROS: Mental status change   Constitutional: Negative for fever.   Respiratory: Positive for cough.    Cardiovascular: Positive for chest pain.  "  Genitourinary: Positive for decreased urine volume.     Physical Exam   BP: 96/77  Heart Rate: 83  Temp: 98  F (36.7  C)  Resp: 16  Height: 172.7 cm (5' 8\")  Weight: 81.6 kg (180 lb)  SpO2: 100 %    Physical Exam   Constitutional: No distress.   HENT:   Head: Normocephalic.   Eyes: Pupils are equal, round, and reactive to light.   Neck: Neck supple.   Cardiovascular:   Characteristic machinery sound of LVAD function   Pulmonary/Chest: Effort normal. No respiratory distress. He has no wheezes.   Abdominal: There is no tenderness.   Mild abdominal distention no tenderness   Musculoskeletal: He exhibits no deformity.   Neurological: He is alert.   Patient and nonverbal is nonverbal at baseline.  Patient opens eyes makes eye contact, does not follow commands   Skin: Skin is warm.   Psychiatric: He has a normal mood and affect.       ED Course   7:45 PM  The patient was seen and examined by Dr. Bhatt in Room 10.    ED Course     Procedures             EKG Interpretation:      Interpreted by Abilio Bhatt  Time reviewed: 1940  Symptoms at time of EKG: Chest pain  Rhythm: normal sinus   Rate: [id number]  Axis: Left ectopy: Show PVCs  Conduction: normal  ST Segments/ T Waves: No ST elevation, flattened T waves in lateral leads  Q Waves: none  Comparison to prior: Unchanged    Clinical Impression: normal EKG      Labs Ordered and Resulted from Time of ED Arrival Up to the Time of Departure from the ED   CBC WITH PLATELETS DIFFERENTIAL - Abnormal; Notable for the following:        Result Value    RBC Count 2.80 (*)     Hemoglobin 7.8 (*)     Hematocrit 27.0 (*)     MCHC 28.9 (*)     RDW 22.3 (*)     Absolute Eosinophils 1.1 (*)     All other components within normal limits   BASIC METABOLIC PANEL - Abnormal; Notable for the following:     Chloride 112 (*)     Glucose 135 (*)     Urea Nitrogen 42 (*)     Creatinine 1.77 (*)     GFR Estimate 39 (*)     GFR Estimate If Black 47 (*)     Calcium 8.1 (*)     All other " components within normal limits   INR - Abnormal; Notable for the following:     INR 1.45 (*)     All other components within normal limits   NT PROBNP INPATIENT - Abnormal; Notable for the following:     N-Terminal Pro BNP Inpatient 6634 (*)     All other components within normal limits   UA MACROSCOPIC WITH REFLEX TO MICRO AND CULTURE - Abnormal; Notable for the following:     Blood Urine Moderate (*)     Protein Albumin Urine 30 (*)     Leukocyte Esterase Urine Large (*)     WBC Urine 76 (*)     Bacteria Urine Few (*)     All other components within normal limits   GLUCOSE BY METER - Abnormal; Notable for the following:     Glucose 108 (*)     All other components within normal limits   TROPONIN I   PARTIAL THROMBOPLASTIN TIME   LACTIC ACID WHOLE BLOOD   PULSE OXIMETRY NURSING   CARDIAC CONTINUOUS MONITORING   PERIPHERAL IV CATHETER   PATIENT CARE ORDER   STRAIGHT CATH FOR URINE   ISTAT TROPONIN NURSING POCT   TROPONIN POCT   URINE CULTURE AEROBIC BACTERIAL           Results for orders placed or performed during the hospital encounter of 07/05/18   XR Chest Port 1 View    Narrative    Exam: XR CHEST PORT 1 VW, 7/5/2018 8:20 PM    Indication: cough, chest pain;     Comparison: 6/28/2018, 6/17/2018, 6/14/2018    Findings:   AP views of the chest were obtained. Left chest pacemaker/implantable  cardiac defibrillator in stable position. Partially imaged left  ventricular assist device. Sternotomy wires and mediastinal surgical  clips; CABG. The feeding tube passes below the field-of-view. The  cardiomediastinal silhouette is unchanged. Decreased lung volumes. No  pneumothorax. Small pleural effusions. Unchanged left basilar  opacities. Partially imaged biliary drain in the upper abdomen.      Impression    Impression:   Stable small pleural effusions and left basilar  atelectasis/consolidation.    I have personally reviewed the examination and initial interpretation  and I agree with the findings.    HUNTER SEPULVEAD,  MD   XR Abdomen 2 Views    Narrative    Preliminary report:  This is a preliminary resident interpretation. Full report to follow.        Impression    Impression:   1. Nonobstructive bowel gas pattern.  2. Support devices are stable.  3. Chronic right femoral neck nonunion fracture with stable superior  displacement of the right femur.  4. Left greater than on right bibasilar pulmonary opacity.   CBC with platelets differential   Result Value Ref Range    WBC 10.0 4.0 - 11.0 10e9/L    RBC Count 2.80 (L) 4.4 - 5.9 10e12/L    Hemoglobin 7.8 (L) 13.3 - 17.7 g/dL    Hematocrit 27.0 (L) 40.0 - 53.0 %    MCV 96 78 - 100 fl    MCH 27.9 26.5 - 33.0 pg    MCHC 28.9 (L) 31.5 - 36.5 g/dL    RDW 22.3 (H) 10.0 - 15.0 %    Platelet Count 209 150 - 450 10e9/L    Diff Method Automated Method     % Neutrophils 67.8 %    % Lymphocytes 14.0 %    % Monocytes 6.7 %    % Eosinophils 10.6 %    % Basophils 0.5 %    % Immature Granulocytes 0.4 %    Nucleated RBCs 0 0 /100    Absolute Neutrophil 6.8 1.6 - 8.3 10e9/L    Absolute Lymphocytes 1.4 0.8 - 5.3 10e9/L    Absolute Monocytes 0.7 0.0 - 1.3 10e9/L    Absolute Eosinophils 1.1 (H) 0.0 - 0.7 10e9/L    Absolute Basophils 0.1 0.0 - 0.2 10e9/L    Abs Immature Granulocytes 0.0 0 - 0.4 10e9/L    Absolute Nucleated RBC 0.0     Platelet Estimate Confirming automated cell count    Basic metabolic panel   Result Value Ref Range    Sodium 139 133 - 144 mmol/L    Potassium 4.3 3.4 - 5.3 mmol/L    Chloride 112 (H) 94 - 109 mmol/L    Carbon Dioxide 22 20 - 32 mmol/L    Anion Gap 6 3 - 14 mmol/L    Glucose 135 (H) 70 - 99 mg/dL    Urea Nitrogen 42 (H) 7 - 30 mg/dL    Creatinine 1.77 (H) 0.66 - 1.25 mg/dL    GFR Estimate 39 (L) >60 mL/min/1.7m2    GFR Estimate If Black 47 (L) >60 mL/min/1.7m2    Calcium 8.1 (L) 8.5 - 10.1 mg/dL   Troponin I   Result Value Ref Range    Troponin I ES <0.015 0.000 - 0.045 ug/L   INR   Result Value Ref Range    INR 1.45 (H) 0.86 - 1.14   Partial thromboplastin time   Result  Value Ref Range    PTT 35 22 - 37 sec   Nt probnp inpatient (BNP)   Result Value Ref Range    N-Terminal Pro BNP Inpatient 6634 (H) 0 - 900 pg/mL   Lactic acid whole blood   Result Value Ref Range    Lactic Acid 1.3 0.7 - 2.0 mmol/L   UA reflex to Microscopic and Culture   Result Value Ref Range    Color Urine Yellow     Appearance Urine Clear     Glucose Urine Negative NEG^Negative mg/dL    Bilirubin Urine Negative NEG^Negative    Ketones Urine Negative NEG^Negative mg/dL    Specific Gravity Urine 1.014 1.003 - 1.035    Blood Urine Moderate (A) NEG^Negative    pH Urine 6.5 5.0 - 7.0 pH    Protein Albumin Urine 30 (A) NEG^Negative mg/dL    Urobilinogen mg/dL 2.0 0.0 - 2.0 mg/dL    Nitrite Urine Negative NEG^Negative    Leukocyte Esterase Urine Large (A) NEG^Negative    Source Midstream Urine     RBC Urine 1 0 - 2 /HPF    WBC Urine 76 (H) 0 - 5 /HPF    Bacteria Urine Few (A) NEG^Negative /HPF    Squamous Epithelial /HPF Urine 1 0 - 1 /HPF    Transitional Epi <1 0 - 1 /HPF   Glucose by meter   Result Value Ref Range    Glucose 108 (H) 70 - 99 mg/dL   Troponin POCT   Result Value Ref Range    Troponin I 0.04 0.00 - 0.10 ug/L   Urine Culture Aerobic Bacterial   Result Value Ref Range    Specimen Description Midstream Urine     Special Requests Specimen received in preservative     Culture Micro PENDING      *Note: Due to a large number of results and/or encounters for the requested time period, some results have not been displayed. A complete set of results can be found in Results Review.         Assessments & Plan (with Medical Decision Making)   68-year-old patient arrives with a chief complaint of chest pain.  This patient has complicated medical history including LVAD placement 5 years ago, and recent CVA resulting in the patient being nonverbal as well as requiring NG tube for feedings.  Patient does occasionally say words indicate he had some chest pain earlier today.  Upon arrival patient is resting comfortably  and is in no distress.  Patient's initial and follow-up troponin are both in the normal range.  EKG was unchanged from his prior.  X-ray abdomen and chest were notable for basilar effusion as well as possible atelectasis versus infiltrate.  Patient is currently afebrile here and his family does not report any coughing or worsening respiratory symptoms so I do not feel this likely represents a pneumonia.  Patient's urine did have a positive leukocyte esterase as well as white cells so we will give him antibiotics for possible urinary tract infection.  His family members which are his caregivers did note that he had a change in urine odor recently so this likely represents a new infection.  Patient's INR was subtherapeutic however the family is to medication with the Coumadin clinic.  They stated the dosage was recently increased.  Patient's hemoglobin and creatinine were both normal as well.  However these are consistent with labs drawn approximately 1 week ago and have not worsened since.  Patient's family understand that with his multiple medical conditions and age he is likely to the end of his life and they prefer to keep him at home as much as possible.  They are comfortable with discharge home.  I have reviewed the nursing notes.    I have reviewed the findings, diagnosis, plan and need for follow up with the patient.    New Prescriptions    CEPHALEXIN (KEFLEX) 250 MG/5ML SUSPENSION    Take 10 mLs (500 mg) by mouth 4 times daily for 7 days       Final diagnoses:   Acute chest pain   Acute cystitis without hematuria   ISebastian, am serving as a trained medical scribe to document services personally performed by Abilio Bhatt MD, based on the provider's statements to me.   IAbilio MD, was physically present and have reviewed and verified the accuracy of this note documented by Sebastian eKrr.     7/5/2018   [facility], [place], EMERGENCY DEPARTMENT     Abilio Bhatt,    07/06/18 0133

## 2018-07-06 NOTE — PROGRESS NOTES
ANTICOAGULATION FOLLOW-UP CLINIC VISIT    Patient Name:  Sohan Rendon  Date:  7/6/2018  Contact Type:  Telephone    SUBJECTIVE:     Patient Findings     Positives Change in medications, Change in diet/appetite, Other complaints    Comments Pt seen in the ED due to heart burn and N/V. Pt is off his tube feedings and is started on Keflex 500mg QID for 7 days. Spoke with daughter Almaz and she is confused of why INR 7/5 2.00 and in the ER 1.45. Writer explained that errors can occur with a home monitoring machine via fingerstick and venous draws. Did instruct Almaz to have pt increase ASA to 325mg Daily and will get another INR on Monday 7/9. Almaz is worried, but writer explained that we are watching her father closely to keep an eye on him. Almaz communicated understanding. Writer also spoke with Martina  Home Care Nurse and she reports that MD (Dr. Raghu Almodovar with [facility]) stopped tube feedings. Dr. Almodovar wants an INR on Tuesday, but writer explained to Martina that we do have our orders through the LVAD team until we are advised otherwise. Also explained to Martina how daughters are very concern and would bring pt into the hospital so with checking INR sooner this relieves daughters anxiety to bring pt into the hospital. Martina communicated understanding.           OBJECTIVE    INR   Date Value Ref Range Status   07/05/2018 1.45 (H) 0.86 - 1.14 Final     Chromogenic Factor 10   Date Value Ref Range Status   08/10/2014 24 (L) 70 - 130 % Final     Comment:     Therapeutic Range:  A Chromogenic Factor 10 level of approximately 20-40%   inversely correlates with an INR of 2-3 for patients receiving Warfarin.   Chromogenic Factor 10 levels below 20% indicate an INR greater than 3 and   levels above 40% indicate an INR less than 2.       Factor 2 Assay   Date Value Ref Range Status   07/21/2014 25 (L) 60 - 140 % Final     Comment:     Analyte Specific Reagents (ASRs) are used in many laboratory tests  necessary   for   standard medical care and generally do not require FDA approval.  This test   was   developed and its performance characteristics determined by [facility].  It has not been cleared or approved by   the [place] Food and Drug Administration.         ASSESSMENT / PLAN  INR assessment SUB    Recheck INR In: 3 DAYS    INR Location Clinic      Anticoagulation Summary as of 7/6/2018     INR goal    Prior goal 1.8-2.5   Today's INR 1.45! (7/5/2018)   Warfarin maintenance plan No maintenance plan   Full warfarin instructions 7/6: 7 mg; 7/7: 6 mg; 7/8: 6 mg   Weekly warfarin total 25.5 mg   Plan last modified Blanca Bah RN (4/5/2018)   Next INR check 7/9/2018   Priority INR   Target end date Indefinite    Indications   LVAD (left ventricular assist device) present [Z95.811]  Long-term (current) use of anticoagulants [Z79.01] [Z79.01]         Anticoagulation Episode Summary     INR check location     Preferred lab     [facility]    Comments Goal Range is 1.8-2.3 5/14/18 Restarted ASA 81mg Daily   [facility] comes out to draw INR  Yancy @ [phone]  Alex Muse Ph [phone]      Anticoagulation Care Providers     Provider Role Specialty Phone number    Evon Lemons MD Responsible Cardiology [phone]            See the Encounter Report to view Anticoagulation Flowsheet and Dosing Calendar (Go to Encounters tab in chart review, and find the Anticoagulation Therapy Visit)    Spoke with alex Muse and Martina [facility] Nurse. Gave them new warfarin recommendation.  No missed doses, no falls. No signs or symptoms of bleed or clotting.     Sarah Martinez, SHAWN

## 2018-07-07 PROBLEM — R53.2 FUNCTIONAL QUADRIPLEGIA (H): Status: ACTIVE | Noted: 2018-01-01

## 2018-07-08 PROBLEM — N39.0 UTI (URINARY TRACT INFECTION): Status: ACTIVE | Noted: 2018-01-01
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
## 2018-07-08 NOTE — IP AVS SNAPSHOT
"                  MRN:[id number]                      After Visit Summary   2018    Sohan Rendon    MRN: [id number]           Thank you!     Thank you for choosing [facility] for your care. Our goal is always to provide you with excellent care. Hearing back from our patients is one way we can continue to improve our services. Please take a few minutes to complete the written survey that you may receive in the mail after you visit with us. Thank you!        Patient Information     Date Of Birth          1950        Designated Caregiver       Most Recent Value    Caregiver    Will someone help with your care after discharge? yes    Name of designated caregiver Almaz    Phone number of caregiver     Caregiver address [address] [facility], [address], [address]      About your hospital stay     You were admitted on:  2018 You last received care in the:  [address] [facility]    You were discharged on:  2018        Reason for your hospital stay       You were admitted to the hospital for an infection in your bladder. You will discharge to home on an oral antibiotic for further treatment. Please notify your Cardiologist for fever, chills, worsening abdominal distention, chest pain, worsening shortness of breath, and recurrent vomiting.    Questions and [phone].   First press #1 for the "[employer] and then press #3 for \"Medical Questions\" to reach us Cardiology Nurses.   On Call Cardiologist for after hours or on weekends: [phone] option #4 and ask to speak to the on-call Cardiologist. Inform them you are a CORE/heart failure patient at the [facility].                  Who to Call     For medical emergencies, please call 911.  For non-urgent questions about your medical care, please call your primary care provider or clinic, [phone]          Attending Provider     Provider Specialty    Lily Garcia MD Emergency Medicine    Carrie Butler MD " Cardiology    Dafne Aviles MD Cardiology       Primary Care Provider Office Phone # Fax CLAIRE Sparrow [phone]      After Care Instructions     Activity       Your activity upon discharge: activity as tolerated            Adult Formula Drip Feeding       Nepro at 50 ml/hr for 24 hours.   Free water 30 ml every 4 hours            Diet       Follow this diet upon discharge: - Low sodium diet, Dysphagia level II with thin liquids            Wound care and dressings       Instructions to care for your wound at home: LVAD dressing change as prior to admission.                  Follow-up Appointments     Follow Up and recommended labs and tests       INR Friday per [facility] RN.  Repeat BMP Monday per [facility] RN  Follow up with Maureen Grant 7/18/18 at 2 PM.                  Your next 10 appointments already scheduled     Jul 17, 2018  1:30 PM CDT   Lab with  LAB   [facility])    [address]  1st Floor  [facility] [id number] [phone]            Jul 17, 2018  2:00 PM CDT   (Arrive by 1:45 PM)   Ventricular Assist Device with Maureen Grant NP   [facility])    [address]  [address]  [facility] [address] [phone]            Jul 18, 2018  1:00 PM CDT   Home Follow Up with Raghu Almodovar MD   [facility])    [address]  [address]  [facility] [id number] [phone]              Additional Services     Home care nursing referral       [facility]  Phone: [phone]   Fax: [phone]    For resumption of Palliative Care Program.   RN obdulia post hospitalization.   Assess: vital signs, respiratory and cardiac status, activity tolerance, hydration, nutritional status.   Med set up and management.  INR lab draws; with results to the [facility], next due on 7/13/2018    Your provider has ordered  home care nursing services. If you have not been contacted within 2 days of your discharge please call the inpatient department phone number at [phone] .            Home infusion referral       Your provider has referred you to:   [facility]   Phone: [phone]     For home tube feeding supplies.   Formula: Nepro at 50 ml/hr continuous   Free water 30 ml every 4 hours   Route: Nasoduodenal tube            Medication Therapy Management Referral       MTM referral reason            Patient had a hospital or ED visit in last 6 months and has more than 10   PTA or Discharge medications    Patient has 5 PTA or Discharge Medications AND one of the following   diagnoses: DM,HF,COPD,AMI DX,PULM HTN       This service is designed to help you get the most from your medications.  A specially trained pharmacist will work closely with you and your doctors  to solve any problems related to your medications and to help you get the   best results from taking them.      The Medication Therapy Management staff will call you to schedule an appointment.                  Warfarin Instruction     You have started taking a medicine called warfarin. This is a blood-thinning medicine (anticoagulant). It helps prevent and treat blood clots.      Before leaving the hospital, make sure you know how much to take and how long to take it.      You will need regular blood tests to make sure your blood is clotting safely. It is very important to see your doctor for regular blood tests.    Talk to your doctor before taking any new medicine (this includes over-the-counter drugs and herbal products). Many medicines can interact with warfarin. This may cause more bleeding or too much clotting.     Eating a lot of vitamin K--found in green, leafy vegetables--can change the way warfarin works in your body. Do NOT avoid these foods. Instead, try to eat the same amount each day.     Bleeding is the most common side-effect of warfarin.  "You may notice bleeding gums, a bloody nose, bruises and bleeding longer when you cut yourself. See a doctor at once if:   o You cough up blood  o You find blood in your stool (poop)  o You have a deep cut, or a cut that bleeds longer than 10 minutes   o You have a bad cut, hard fall, accident or hit your head (go to urgent care or the emergency room).    For women who can get pregnant: This medicine can harm an unborn baby. Be very careful not to get pregnant while taking this medicine. If you think you might be pregnant, call your doctor right away.    For more information, read \"Guide to Warfarin Therapy,  the booklet you received in the hospital.        Pending Results     Date and Time Order Name Status Description    7/8/2018 1422 Blood culture Preliminary     7/8/2018 1422 Blood culture Preliminary             Statement of Approval     Ordered          07/12/18 1551  I have reviewed and agree with all the recommendations and orders detailed in this document.  EFFECTIVE NOW     Approved and electronically signed by:  Zuleika Patterson APRN CNP             Admission Information     Date & Time Provider Department Dept. Phone    7/8/2018 Dafne Aviles MD [address] [facility] [phone]      Your Vitals Were     Blood Pressure Pulse Temperature Respirations Weight Pulse Oximetry    77/59 (BP Location: Right leg) 107 97.8  F (36.6  C) (Axillary) 16 85.7 kg (188 lb 15 oz) 98%    BMI (Body Mass Index)                   28.73 kg/m2           [employer] Information     [employer] lets you send messages to your doctor, view your test results, renew your prescriptions, schedule appointments and more. To sign up, go to [email or web address] . Click on \"Log in\" on the left side of the screen, which will take you to the Welcome page. Then click on \"Sign up Now\" on the right side of the page.     You will be asked to enter the access code listed below, as well as some personal information. Please follow the " directions to create your username and password.     Your access code is: [email or web address]  Expires: 2018  3:38 PM     Your access code will  in 90 days. If you need help or a new code, please call your [facility] clinic or [phone].        Care EveryWhere ID     This is your Care EveryWhere ID. This could be used by other organizations to access your [facility] medical records  [id number]        Equal Access to Services     GIULIANA SKINNER : Hadii aad ku hadasho Soomaali, waaxda luqadaha, qaybta kaalmada adeegyada, waxay lilliein haymarcon neelima bandaryancasandra dorman . [facility] [phone].    ATENCIÓN: Si habla español, tiene a smith disposición servicios gratuitos de asistencia lingüística. Llame al [phone].    We comply with applicable federal civil rights laws and [place] laws. We do not discriminate on the basis of race, color, national origin, age, disability, sex, sexual orientation, or gender identity.               Review of your medicines      START taking        Dose / Directions    linezolid 600 MG tablet   Commonly known as:  ZYVOX   Indication:  Urinary Tract Infection   Used for:  Urinary tract infection without hematuria, site unspecified        Dose:  600 mg   Take 1 tablet (600 mg) by mouth every 12 hours for 10 days   Quantity:  20 tablet   Refills:  0         CONTINUE these medicines which may have CHANGED, or have new prescriptions. If we are uncertain of the size of tablets/capsules you have at home, strength may be listed as something that might have changed.        Dose / Directions    warfarin 1 MG tablet   Commonly known as:  COUMADIN   This may have changed:  how much to take   Used for:  LVAD (left ventricular assist device) present (H)        Dose:  1 mg   Take 1 tablet (1 mg) by mouth daily Or as directed after INR dosing changes   Quantity:  120 tablet   Refills:  11         CONTINUE these medicines which have NOT CHANGED        Dose / Directions    acetaminophen 32 mg/mL solution    Commonly known as:  TYLENOL   Used for:  Aphasia        Dose:  650 mg   20.3 mLs (650 mg) by Oral or Feeding Tube route every 6 hours as needed for mild pain   Quantity:  300 mL   Refills:  3       aspirin 325 MG tablet   Used for:  LVAD (left ventricular assist device) present (H), Chronic congestive heart failure, unspecified congestive heart failure type (H)        Dose:  325 mg   Take 1 tablet (325 mg) by mouth daily   Quantity:  120 tablet   Refills:  3       ALEK CONTOUR test strip   Used for:  Hypoglycemia   Generic drug:  blood glucose monitoring        USE TO TEST BLOOD SUGAR 2 TIMES DAILY OR AS DIRECTED.   Quantity:  100 strip   Refills:  2       bisacodyl 10 MG Suppository   Commonly known as:  DULCOLAX   Used for:  Drug-induced constipation        Dose:  10 mg   Place 1 suppository (10 mg) rectally daily as needed for constipation   Quantity:  5 suppository   Refills:  1       * blood glucose monitoring lancets   Used for:  Diabetes mellitus, type 2 (H)        Use to test blood sugars 2 times daily or as directed.   Quantity:  1 Box   Refills:  3       * blood glucose monitoring lancets   Used for:  Diabetes mellitus, type 2 (H)        USE TO TEST BLOOD SUGAR 2 TIMES DAILY OR AS DIRECTED   Quantity:  100 each   Refills:  2       finasteride 5 MG tablet   Commonly known as:  PROSCAR   Used for:  BPH (benign prostatic hyperplasia)        Dose:  5 mg   Take 1 tablet (5 mg) by mouth daily Patient needs to be seen in clinic prior to additional refills.   Quantity:  30 tablet   Refills:  0       levETIRAcetam 100 MG/ML solution   Commonly known as:  KEPPRA   Used for:  Seizure (H)        Dose:  750 mg   Take 7.5 mLs (750 mg) by mouth every 12 hours   Quantity:  500 mL   Refills:  3       loratadine 5 MG/5ML syrup   Commonly known as:  CLARITIN   Used for:  Aphasia        Dose:  10 mg   10 mLs (10 mg) by Oral or Feeding Tube route daily   Quantity:  300 mL   Refills:  3       Melatonin 10 MG Tabs tablet    Used for:  Insomnia due to medical condition        Dose:  10 mg   Take 1 tablet (10 mg) by mouth At Bedtime   Quantity:  30 tablet   Refills:  11       nitroGLYcerin 0.4 MG sublingual tablet   Commonly known as:  NITROSTAT   Used for:  Coronary artery disease involving native coronary artery of native heart with unstable angina pectoris (H)        Dose:  0.4 mg   Place 1 tablet (0.4 mg) under the tongue every 5 minutes as needed for chest pain   Quantity:  30 tablet   Refills:  1       ondansetron 4 MG/5ML solution   Commonly known as:  ZOFRAN        Dose:  4 mg   5 mLs (4 mg) by Per Feeding Tube route 2 times daily as needed for nausea or vomiting   Quantity:  90 mL   Refills:  0       order for DME   Used for:  Fracture of femoral neck, right, closed, initial encounter, Stroke (H), CHF (congestive heart failure), NYHA class IV (H)        Equipment being ordered: Lift chair.  Please see indications and face-to-face encounter details in 2/3/15 Office Visit note.   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   Quantity:  1 each   Refills:  0       * order for DME   Used for:  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), Displaced fracture of right femoral neck, closed, with nonunion, subsequent encounter        Equipment being ordered: Wheelchair, manual, with elevated leg rests and tilt in space back.  Please fax to [facility]; I called them 11/26/16 and they requested the new order.  Face to face is in my 10/26/16 note.   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   Quantity:  2 each   Refills:  0       * order for DME   Used for:  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)        Bilateral heel protective cushioning boots.  Dx: previous stroke with left hemiplegia.  Duration of need: 99 months.   Quantity:  2 each   Refills:  0       order for DME   Used for:  Pulmonary atelectasis        Equipment being ordered: Nebulizer   Quantity:  1 each   Refills:  11       pantoprazole 2 mg/mL Susp suspension   Commonly known as:  PROTONIX   Used for:  Cerebrovascular accident (CVA), unspecified mechanism (H), LVAD (left ventricular assist device) present (H)        Dose:  20 mg   10 mLs (20 mg) by Oral or Feeding Tube route 2 times daily   Quantity:  400 mL   Refills:  3       protein modular   Used for:  Cerebrovascular accident (CVA), unspecified mechanism (H)        Dose:  1 packet   1 packet by Per Feeding Tube route 3 times daily   Quantity:  90 packet   Refills:  3       senna-docusate 8.6-50 MG per tablet   Commonly known as:  SENOKOT-S;PERICOLACE        Dose:  1-2 tablet   Take 1-2 tablets by  mouth 2 times daily as needed for constipation   Refills:  0       simethicone 80 MG chewable tablet   Commonly known as:  MYLICON        Dose:  80 mg   Take 80 mg by mouth every 6 hours as needed for flatulence or cramping   Refills:  0       tamsulosin 0.4 MG capsule   Commonly known as:  FLOMAX   Used for:  Incomplete bladder emptying        Dose:  0.8 mg   Take 2 capsules (0.8 mg) by mouth daily   Quantity:  60 capsule   Refills:  11       thiamine 100 MG tablet   Used for:  Cerebrovascular accident (CVA) due to embolism of right middle cerebral artery (H)        Dose:  100 mg   Take 1 tablet (100 mg) by mouth daily   Quantity:  90 tablet   Refills:  3       * Notice:  This list has 9 medication(s) that are the same as other medications prescribed for you. Read the directions carefully, and ask your doctor or other care provider to review them with you.      STOP taking     cephalexin 250 MG/5ML suspension   Commonly known as:  KEFLEX           losartan 2.5 mg/mL Susp   Commonly known as:  COZAAR                Where to get your medicines      These medications were sent to [facility] [place], [place] - [address]  [address] [address]     Phone:  [phone]     linezolid 600 MG tablet    warfarin 1 MG tablet                Protect others around you: Learn how to safely use, store and throw away your medicines at www.disposemymeds.org.        ANTIBIOTIC INSTRUCTION     You've Been Prescribed an Antibiotic - Now What?  Your healthcare team thinks that you or your loved one might have an infection. Some infections can be treated with antibiotics, which are powerful, life-saving drugs. Like all medications, antibiotics have side effects and should only be used when necessary. There are some important things you should know about your antibiotic treatment.      Your healthcare team may run tests before you start taking an antibiotic.    Your team may take samples  (e.g., from your blood, urine or other areas) to run tests to look for bacteria. These test can be important to determine if you need an antibiotic at all and, if you do, which antibiotic will work best.      Within a few days, your healthcare team might change or even stop your antibiotic.    Your team may start you on an antibiotic while they are working to find out what is making you sick.    Your team might change your antibiotic because test results show that a different antibiotic would be better to treat your infection.    In some cases, once your team has more information, they learn that you do not need an antibiotic at all. They may find out that you don't have an infection, or that the antibiotic you're taking won't work against your infection. For example, an infection caused by a virus can't be treated with antibiotics. Staying on an antibiotic when you don't need it is more likely to be harmful than helpful.      You may experience side effects from your antibiotic.    Like all medications, antibiotics have side effects. Some of these can be serious.    Let you healthcare team know if you have any known allergies when you are admitted to the hospital.    One significant side effect of nearly all antibiotics is the risk of severe and sometimes deadly diarrhea caused by Clostridium difficile (C. Difficile). This occurs when a person takes antibiotics because some good germs are destroyed. Antibiotic use allows C. diificile to take over, putting patients at high risk for this serious infection.    As a patient or caregiver, it is important to understand your or your loved one's antibiotic treatment. It is especially important for caregivers to speak up when patients can't speak for themselves. Here are some important questions to ask your healthcare team.    What infection is this antibiotic treating and how do you know I have that infection?    What side effects might occur from this antibiotic?    How  long will I need to take this antibiotic?    Is it safe to take this antibiotic with other medications or supplements (e.g., vitamins) that I am taking?     Are there any special directions I need to know about taking this antibiotic? For example, should I take it with food?    How will I be monitored to know whether my infection is responding to the antibiotic?    What tests may help to make sure the right antibiotic is prescribed for me?      Information provided by:  www.cdc.gov/getsmart  U.S. Department of Health and Human Services  Centers for disease Control and Prevention  National Center for Emerging and Zoonotic Infectious Diseases  Division of Healthcare Quality Promotion             Medication List: This is a list of all your medications and when to take them. Check marks below indicate your daily home schedule. Keep this list as a reference.      Medications           Morning Afternoon Evening Bedtime As Needed    acetaminophen 32 mg/mL solution   Commonly known as:  TYLENOL   20.3 mLs (650 mg) by Oral or Feeding Tube route every 6 hours as needed for mild pain                                   aspirin 325 MG tablet   Take 1 tablet (325 mg) by mouth daily   Last time this was given:  325 mg on 7/12/2018  4:04 PM                                   ALEK CONTOUR test strip   USE TO TEST BLOOD SUGAR 2 TIMES DAILY OR AS DIRECTED.   Generic drug:  blood glucose monitoring                                bisacodyl 10 MG Suppository   Commonly known as:  DULCOLAX   Place 1 suppository (10 mg) rectally daily as needed for constipation   Last time this was given:  10 mg on 7/11/2018  5:41 PM                                   * blood glucose monitoring lancets   Use to test blood sugars 2 times daily or as directed.                                * blood glucose monitoring lancets   USE TO TEST BLOOD SUGAR 2 TIMES DAILY OR AS DIRECTED                                finasteride 5 MG tablet   Commonly known as:   PROSCAR   Take 1 tablet (5 mg) by mouth daily Patient needs to be seen in clinic prior to additional refills.   Last time this was given:  5 mg on 7/12/2018  9:19 AM                                   levETIRAcetam 100 MG/ML solution   Commonly known as:  KEPPRA   Take 7.5 mLs (750 mg) by mouth every 12 hours   Last time this was given:  750 mg on 7/12/2018 12:39 PM                                      linezolid 600 MG tablet   Commonly known as:  ZYVOX   Take 1 tablet (600 mg) by mouth every 12 hours for 10 days   Last time this was given:  600 mg on 7/12/2018  9:19 AM                                      loratadine 5 MG/5ML syrup   Commonly known as:  CLARITIN   10 mLs (10 mg) by Oral or Feeding Tube route daily                                   Melatonin 10 MG Tabs tablet   Take 1 tablet (10 mg) by mouth At Bedtime   Last time this was given:  10 mg on 7/11/2018 10:11 PM                                   nitroGLYcerin 0.4 MG sublingual tablet   Commonly known as:  NITROSTAT   Place 1 tablet (0.4 mg) under the tongue every 5 minutes as needed for chest pain                                   ondansetron 4 MG/5ML solution   Commonly known as:  ZOFRAN   5 mLs (4 mg) by Per Feeding Tube route 2 times daily as needed for nausea or vomiting                                   order for DME   Equipment being ordered: Lift chair.  Please see indications and face-to-face encounter details in 2/3/15 Office Visit note.                                * order for DME   Equipment being ordered: Bilateral leg supports for manual wheelchair.                                * order for DME   Equipment being ordered: Reclining manual w/c with bilateral elevating leg rests.                                * order for DME   Equipment being ordered: Hospital Bed, fully electric.                                * order for DME   Equipment being ordered: Wheelchair, manual.                                * order for DME   Equipment being  ordered: Wheelchair, manual, with elevated leg rests and tilt in space back.  Please fax to [facility]; I called them 11/26/16 and they requested the new order.  Face to face is in my 10/26/16 note.                                * order for DME   Equipment being ordered: Cushioned heel boots.                                * order for DME   Bilateral heel protective cushioning boots.  Dx: previous stroke with left hemiplegia.  Duration of need: 99 months.                                order for DME   Equipment being ordered: Nebulizer                                pantoprazole 2 mg/mL Susp suspension   Commonly known as:  PROTONIX   10 mLs (20 mg) by Oral or Feeding Tube route 2 times daily   Last time this was given:  20 mg on 7/8/2018 11:02 PM                                      protein modular   1 packet by Per Feeding Tube route 3 times daily   Last time this was given:  1 packet on 7/12/2018  2:06 PM                                         senna-docusate 8.6-50 MG per tablet   Commonly known as:  SENOKOT-S;PERICOLACE   Take 1-2 tablets by mouth 2 times daily as needed for constipation   Last time this was given:  2 tablets on 7/12/2018  9:20 AM                                   simethicone 80 MG chewable tablet   Commonly known as:  MYLICON   Take 80 mg by mouth every 6 hours as needed for flatulence or cramping   Last time this was given:  80 mg on 7/12/2018  4:04 PM                                   tamsulosin 0.4 MG capsule   Commonly known as:  FLOMAX   Take 2 capsules (0.8 mg) by mouth daily   Last time this was given:  0.4 mg on 7/11/2018  5:54 PM                                   thiamine 100 MG tablet   Take 1 tablet (100 mg) by mouth daily   Last time this was given:  100 mg on 7/12/2018  9:22 AM                                   warfarin 1 MG tablet   Commonly known as:  COUMADIN   Take 1 tablet (1 mg) by mouth daily Or as directed after INR dosing changes   Last time this was given:  2 mg on  7/10/2018  5:37 PM                                   * Notice:  This list has 9 medication(s) that are the same as other medications prescribed for you. Read the directions carefully, and ask your doctor or other care provider to review them with you.              More Information        Urinary Retention (Male)  Urinary retention is the medical term for difficulty or inability to pass urine, even though your bladder is full.  Causes  The most common cause of urinary retention in men is the bladder outlet being blocked. This can be due to an enlarged prostate gland or a bladder infection. Certain medicines can also cause this problem. This condition is more likely to occur as men get older.    Symptoms  Common symptoms of urinary retention include:    Pain (not experienced by everyone)    Frequent urination    Feeling that the bladder is still full after urinating    Incontinence (not being able to control the release of urine)    Swollen abdomen  Treatment  This condition is treated by inserting a tube (catheter) into the bladder to drain the urine. This provides immediate relief. The catheter may need to stay in place for a few days. The catheter has a balloon on the tip, which is inflated after insertion. This prevents the catheter from falling out.  Home care    If you were given antibiotics, take them until they are used up, or your healthcare provider tells you to stop. It is important to finish the antibiotics even though you feel better. This is to make sure your infection has cleared.    If a catheter was left in place, it is important to keep bacteria from getting into the collection bag. Don't disconnect the catheter from the collection bag.    Use a leg band to secure the drainage tube, so it does not pull on the catheter. Drain the collection bag when it becomes full using the drain spout at the bottom of the bag.    Don't pull on or try to remove your catheter. This will injure your urethra. The  catheter must be removed by a healthcare provider.  Follow-up care  Follow up with your healthcare provider, or as advised.  If a catheter was left in place, it can usually be removed within 3 to 7 days. Some conditions require the catheter to stay in longer. Your healthcare provider will tell you when to return to have the catheter removed.  When to seek medical advice  Call your healthcare provider right away if any of these occur:    Fever of 100.4 F (38 C) or higher, or as directed by your healthcare provider    Bladder or lower-abdominal pain or fullness    Abdominal swelling, nausea, vomiting, or back pain    Blood or urine leakage around the catheter    Bloody urine coming from the catheter (if a new symptom)    Weakness, dizziness, or fainting    Confusion or change in usual level of alertness    If a catheter was left in place, return if:  ? Catheter falls out  ? Catheter stops draining for 6 hours  Date Last Reviewed: 10/1/2017    1566-3995 [employer]. [address] [place] [address]. All rights reserved. This information is not intended as a substitute for professional medical care. Always follow your healthcare professional's instructions.                Urinary Tract Infections in Men  Urinary tract infections (UTIs) are most often caused by bacteria (germs) that invade the urinary tract. The bacteria may come from outside the body. Or they may travel from the skin outside of rectum into the urethra. Pain in or around the urinary tract is a common symptom for most UTIs. But the only way to know for sure if you have a UTI is to have a urinalysis and urine culture.     Four Types of UTIs    Cystitis: A bladder infection, or cystitis, is often linked to a blockage from an enlarged prostate. You may have an urgent or frequent need to urinate, and bloody urine. Treatment includes antibiotics and medications to relax or shrink the prostate. In some cases, surgery is  needed.    Urethritis: This is an infection of the urethra. You may have a discharge from the urethra or burning when you urinate.You may also have pain in the urethra or penis. Urethritis is treated with antibiotics.    Prostatitis: This is an inflammation or infection of the prostate. You may have an urgent or frequent need to urinate, fever, or burning when you urinate. Or you may have a tender prostate, or a vague feeling of pressure. Prostatitis is treated with a range of medications, depending on the cause.    Pyelonephritis: This is a kidney infection. If not treated, it can be serious and damage your kidneys. In severe cases you may be hospitalized. You may have a fever and upper back pain.  Treating a UTI    Medications: Most UTIs are treated with antibiotics. These kill the bacteria. The length of time you need to take them depends on the type of infection. Take antibiotics exactly as directed until all of the medication is gone. If you do not, the infection may not go away and may become harder to treat. For certain types of UTIs, you may be given other medications to help treat your symptoms.    Lifestyle changes: The lifestyle changes below will help get rid of your current infection. They may also help prevent future UTIs.  ? Drink plenty of fluids such as water, juice, or other caffeine-free drinks. This helps flush bacteria out of your system.  ? Empty your bladder when you feel the urge to urinate and before going to sleep. Urine that stays in your bladder promotes infection.  ? Use condoms during sex. These help prevent UTIs caused by sexually transmitted bacteria.  ? Keep follow-up appointments with your health care provider. He or she can may do tests to make sure the infection has cleared. If necessary, additional treatment can be started.    Additional treatment: Most UTIs respond to medication. But sometimes a procedure or surgery is needed. This can treat an enlarged prostate, or remove a  kidney stone or other blockage. Surgery may also treat problems caused by scarring or long-term infections.    1820-5005 [employer]. [address] [place] [address], [place] [address]. All rights reserved. This information is not intended as a substitute for professional medical care. Always follow your healthcare professional's instructions.  This information has been modified by your health care provider with permission from the publisher.

## 2018-07-08 NOTE — IP AVS SNAPSHOT
"    [address] [facility]: [phone]                                              INTERAGENCY TRANSFER FORM - PHYSICIAN ORDERS   2018                    Hospital Admission Date: 2018  DANIEL RICHARD   : 1950  Sex: Male        Attending Provider: Dafne Aviles MD     Allergies:  Seasonal Allergies    Infection:  VRE, ESBL   Service:  CARDIOLOGY    Ht:  1.727 m (5' 8\")   Wt:  85.7 kg (188 lb 15 oz)   Admission Wt:  83.9 kg (185 lb)    BMI:  28.73 kg/m 2   BSA:  2.03 m 2            Patient PCP Information     Provider PCP Type    CLAIRE Poe CNP General      ED Clinical Impression     Diagnosis Description Comment Added By Time Added    Urinary tract infection without hematuria, site unspecified [N39.0] Urinary tract infection without hematuria, site unspecified [N39.0]  Lily Garcia MD 2018  7:17 PM    Acute kidney injury (H) [N17.9] Acute kidney injury (H) [N17.9]  Lily Garcia MD 2018  7:17 PM    LVAD (left ventricular assist device) present (H) [Z95.811] LVAD (left ventricular assist device) present (H) [Z95.811]  Lily Garcia MD 2018  7:17 PM    SOB (shortness of breath) [R06.02] SOB (shortness of breath) [R06.02]  Lily Garcia MD 2018  7:17 PM      Hospital Problems as of 2018              Priority Class Noted POA    UTI (urinary tract infection) Medium  2018 Yes      Non-Hospital Problems as of 2018              Priority Class Noted    CHF (congestive heart failure), NYHA class IV (H) Medium  10/9/2012    Gastroesophageal reflux disease with esophagitis Medium  2012    Clostridium difficile colitis Medium  2012    Embolism of posterior inferior cerebellar artery Medium  3/28/2014    Subtherapeutic international normalized ratio (INR) Medium  2014    Cardiac dysrhythmia Medium  4/15/2014    Cerebrovascular accident (CVA) due to embolism of right middle cerebral artery (H) Medium  " 4/21/2014    LVAD (left ventricular assist device) present Medium  4/21/2014    Mitral regurgitation Medium  Unknown    TB lung, latent Medium  Unknown    HTN (hypertension) Medium  Unknown    Hyperlipidemia Medium  Unknown    PFO (patent foramen ovale) Medium  Unknown    BPH (benign prostatic hyperplasia) Medium  Unknown    GERD (gastroesophageal reflux disease) Medium  Unknown    CKD (chronic kidney disease) Medium  Unknown    Dysphagia Medium  Unknown    Gout Medium  Unknown    Myocardial infarction Medium  Unknown    Ischemic cardiomyopathy Medium  Unknown    Hemolysis Medium  5/27/2014    Implantable cardioverter-defibrillator in situ- [employer]-single chamber- NOT dependent Medium  5/31/2014    Thyroid nodule Medium  8/25/2014    Lung nodule, multiple Medium  8/25/2014    Displaced fracture of right femoral neck, closed, with nonunion, subsequent encounter Medium  2/3/2015    Cataract, right Medium  6/1/2015    Gastric and duodenal angiodysplasia with hemorrhage Medium  6/18/2015    Seizure (H) Medium  6/27/2015    Headache Medium  10/15/2015    Coronary artery disease involving native coronary artery with unstable angina pectoris (H) Medium  10/21/2015    Slow transit constipation Medium  10/21/2015    Pseudophakia, right eye Medium  12/9/2015    Complication associated with cardiac pacemaker lead Medium  1/21/2016    Insomnia due to medical condition Medium  4/27/2016    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) Medium  6/3/2016    Type 2 diabetes mellitus with hyperglycemia, with long-term current use of insulin (H) Medium  6/6/2016    Long-term (current) use of anticoagulants [Z79.01] Medium  6/6/2016    Chronic cough Medium  9/7/2016    Hematuria, gross Medium  9/17/2016    Health Care Home Medium  1/13/2017    Acute appendicitis without peritonitis Medium  2/23/2017    Non-seasonal allergic rhinitis  Medium  2/23/2017    Irritation of right eye Medium  3/16/2017    Abdominal  pain, generalized Medium  5/3/2017    RUQ pain Medium  5/3/2017    Duodenitis Medium  5/4/2017    Acalculous cholecystitis Medium  5/18/2017    Biliary tract obstruction Medium  5/18/2017    Urinary retention Medium  5/18/2017    Acute posthemorrhagic anemia Medium  5/18/2017    Iron deficiency anemia due to chronic blood loss Medium  6/14/2017    Urinary tract infection Medium  6/29/2017    Hypoglycemia Medium  7/10/2017    Trapezius strain, left, subsequent encounter Medium  7/18/2017    Left ventricular assist device (LVAD) complication Medium  8/27/2017    Left foot pain Medium  9/26/2017    Intertriginous dermatitis associated with moisture Medium  10/10/2017    Hematuria Medium  11/22/2017    CKD (chronic kidney disease) stage 4, GFR 15-29 ml/min (H) Medium  1/29/2018    Advanced directives, counseling/discussion Medium  3/17/2018    Pancreatitis Medium  5/1/2018    Altered mental status Medium  5/10/2018    Encephalopathy Medium  6/7/2018    Full incontinence of feces Medium  6/26/2018    Respiratory distress Medium  6/26/2018    Acute on chronic systolic congestive heart failure (H) Medium  7/6/2018    History of ischemic cerebrovascular accident (CVA) with residual deficit Medium  7/6/2018    Functional quadriplegia (H) Medium  7/7/2018      Code Status History     Date Active Date Inactive Code Status Order ID Comments User Context    7/12/2018  1:57 PM  DNR/DNI [id number]  Zuleika Patterson APRN CNP Outpatient    7/8/2018  9:11 PM 7/12/2018  1:57 PM DNR/DNI [id number]  Vicenta Fisher MD Inpatient    6/22/2018  9:17 AM 7/8/2018  9:11 PM DNR/DNI [id number]  Jesusita Kaiser MD Outpatient    6/12/2018  1:48 PM 6/22/2018  9:17 AM DNR/DNI [id number]  Jesusita Kaiser MD Inpatient    6/7/2018  3:50 AM 6/12/2018  1:48 PM Full Code [id number]  Jayna Cruz DO Inpatient    5/12/2018  1:15 PM 6/7/2018  3:50 AM Full Code [id number]  Volodymyr Champion MD Outpatient     5/10/2018  6:22 PM 5/12/2018  1:15 PM Full Code [id number]  Volodymyr Champion MD Inpatient    4/17/2018 12:54 PM 5/10/2018  6:22 PM Full Code [id number]  Zuleika Patterson APRN CNP Outpatient    4/11/2018  4:25 PM 4/17/2018 12:54 PM Full Code [id number]  Doug Dupree MD ED    11/26/2017 12:42 PM 4/11/2018  4:25 PM Full Code [id number]  Mason Toledo MD Outpatient    11/22/2017 10:34 PM 11/26/2017 12:42 PM Full Code [id number]  Jose Roberto Tierney MD Inpatient    8/29/2017  1:23 PM 11/22/2017 10:34 PM Full Code [id number]  Ruben Sevilla MD Outpatient    8/27/2017  2:27 AM 8/29/2017  1:23 PM Full Code [id number]  Doris Montiel MD Inpatient    7/2/2017  3:03 PM 8/27/2017  2:27 AM Full Code [id number]  Avtar Alvarez MD Outpatient    6/29/2017  9:05 PM 7/2/2017  3:03 PM Full Code [id number]  Xenia Ivey MD Inpatient    5/14/2017  8:39 AM 6/29/2017  9:05 PM Full Code [id number]  Gabe Peters MD Outpatient    5/3/2017  3:10 AM 5/14/2017  8:39 AM Full Code [id number]  Bridgett Arriaza MD Inpatient    2/14/2017 10:59 PM 5/3/2017  3:10 AM Full Code [id number]  Jess Moore MD Outpatient    9/19/2016  4:03 PM 2/14/2017 10:59 PM Full Code [id number]  Zuleika Patterson APRN CNP Outpatient    9/17/2016  9:23 PM 9/19/2016  4:03 PM Full Code [id number]  Jaleel Guy MD Inpatient    6/18/2016  8:58 AM 9/17/2016  9:23 PM Full Code [id number]  Bridgett Arriaza MD Outpatient    6/17/2016  2:42 AM 6/18/2016  8:58 AM Full Code [id number]  Angela Wade MD Inpatient    4/25/2016 11:10 AM 6/17/2016  2:42 AM Full Code [id number]  Prosper Poe MD Outpatient    4/11/2016  5:35 AM 4/25/2016 11:10 AM Full Code [id number]  Kelin Arriaga MD Inpatient    10/15/2015  5:20 PM 4/11/2016  5:35 AM DNR [id number]  Remington Fofana MD Outpatient    10/15/2015 10:55 AM 10/15/2015  5:20 PM DNR [id number]  Remington Fofana MD ED    6/29/2015  1:50 PM 10/15/2015 10:55 AM Full Code  [id number]  Dhaval Munoz MD Outpatient    6/27/2015  2:36 AM 6/29/2015  1:50 PM Full Code [id number]  Jordi Carrillo MD ED    6/17/2015 12:42 PM 6/27/2015  2:36 AM Full Code [id number]  Mariam Palafox MD Outpatient    6/8/2015  2:27 AM 6/17/2015 12:42 PM Full Code [id number]  Henry Tello MD Inpatient    4/24/2015 10:53 AM 6/8/2015  2:27 AM Full Code [id number]  Sha Lion MD Outpatient    4/23/2015  9:43 PM 4/24/2015 10:53 AM Full Code [id number]  Shemar Cunha APRN CNP Inpatient    1/30/2015 12:35 PM 4/23/2015  9:43 PM Full Code [id number]  Odilon Uriostegui MD Outpatient    1/23/2015  4:00 AM 1/30/2015 12:35 PM Full Code [id number]  Mendez Perez MD Inpatient    10/27/2014 12:39 AM 10/28/2014  3:58 PM Full Code [id number]  Mariam Palafox MD Inpatient    10/4/2014  1:31 PM 10/27/2014 12:39 AM DNR/DNI [id number]  Anita Parker MD Outpatient    9/29/2014  5:09 PM 10/4/2014  1:31 PM DNR/DNI [id number]  Louie Agosto MD Inpatient    9/24/2014  1:05 AM 9/29/2014  5:09 PM Full Code [id number]  Wandy Lewis MD Inpatient    9/12/2014 12:06 PM 9/24/2014  1:05 AM Full Code [id number]  Roddy Scott MD Outpatient    9/2/2014 10:54 PM 9/12/2014 12:06 PM Full Code [id number]  Edu Angulo MD Inpatient    8/17/2014  9:05 AM 9/2/2014 10:54 PM Full Code [id number]  Bharati Badillo MD Outpatient    8/10/2014  7:18 PM 8/17/2014  9:05 AM Full Code [id number]  Bharati Badillo MD Inpatient    7/25/2014  1:48 PM 8/10/2014  7:18 PM Full Code [id number]  Bharati Badillo MD Outpatient    7/18/2014  9:35 PM 7/25/2014  1:48 PM Full Code [id number]  Hansa Pantoja MD Inpatient    6/13/2014  2:44 PM 7/18/2014  9:35 PM Full Code [id number]  Julio Carbajal MD Outpatient    6/3/2014 12:53 PM 6/13/2014  2:44 PM Full Code [id number]  Mariam Palafox MD Inpatient    4/21/2014  4:19 PM 5/12/2014  7:55 PM Full Code [id number]  Roque Decker Inpatient    4/14/2014  5:23 AM  4/21/2014  4:19 PM Full Code [id number]  Pratibha Sapp MD Inpatient         Medication Review      START taking        Dose / Directions Comments    linezolid 600 MG tablet   Commonly known as:  ZYVOX   Indication:  Urinary Tract Infection   Used for:  Urinary tract infection without hematuria, site unspecified        Dose:  600 mg   Take 1 tablet (600 mg) by mouth every 12 hours for 10 days   Quantity:  20 tablet   Refills:  0          CONTINUE these medications which may have CHANGED, or have new prescriptions. If we are uncertain of the size of tablets/capsules you have at home, strength may be listed as something that might have changed.        Dose / Directions Comments    warfarin 1 MG tablet   Commonly known as:  COUMADIN   This may have changed:  how much to take   Used for:  LVAD (left ventricular assist device) present (H)        Dose:  1 mg   Take 1 tablet (1 mg) by mouth daily Or as directed after INR dosing changes   Quantity:  120 tablet   Refills:  11          CONTINUE these medications which have NOT CHANGED        Dose / Directions Comments    acetaminophen 32 mg/mL solution   Commonly known as:  TYLENOL   Used for:  Aphasia        Dose:  650 mg   20.3 mLs (650 mg) by Oral or Feeding Tube route every 6 hours as needed for mild pain   Quantity:  300 mL   Refills:  3        aspirin 325 MG tablet   Used for:  LVAD (left ventricular assist device) present (H), Chronic congestive heart failure, unspecified congestive heart failure type (H)        Dose:  325 mg   Take 1 tablet (325 mg) by mouth daily   Quantity:  120 tablet   Refills:  3    COUMADIN CLINIC INCREASED  MG AFTER PATIENT'S LAST STROKE       ALEK CONTOUR test strip   Used for:  Hypoglycemia   Generic drug:  blood glucose monitoring        USE TO TEST BLOOD SUGAR 2 TIMES DAILY OR AS DIRECTED.   Quantity:  100 strip   Refills:  2        bisacodyl 10 MG Suppository   Commonly known as:  DULCOLAX   Used for:  Drug-induced constipation         Dose:  10 mg   Place 1 suppository (10 mg) rectally daily as needed for constipation   Quantity:  5 suppository   Refills:  1        * blood glucose monitoring lancets   Used for:  Diabetes mellitus, type 2 (H)        Use to test blood sugars 2 times daily or as directed.   Quantity:  1 Box   Refills:  3        * blood glucose monitoring lancets   Used for:  Diabetes mellitus, type 2 (H)        USE TO TEST BLOOD SUGAR 2 TIMES DAILY OR AS DIRECTED   Quantity:  100 each   Refills:  2    WAO       finasteride 5 MG tablet   Commonly known as:  PROSCAR   Used for:  BPH (benign prostatic hyperplasia)        Dose:  5 mg   Take 1 tablet (5 mg) by mouth daily Patient needs to be seen in clinic prior to additional refills.   Quantity:  30 tablet   Refills:  0        levETIRAcetam 100 MG/ML solution   Commonly known as:  KEPPRA   Used for:  Seizure (H)        Dose:  750 mg   Take 7.5 mLs (750 mg) by mouth every 12 hours   Quantity:  500 mL   Refills:  3        loratadine 5 MG/5ML syrup   Commonly known as:  CLARITIN   Used for:  Aphasia        Dose:  10 mg   10 mLs (10 mg) by Oral or Feeding Tube route daily   Quantity:  300 mL   Refills:  3        Melatonin 10 MG Tabs tablet   Used for:  Insomnia due to medical condition        Dose:  10 mg   Take 1 tablet (10 mg) by mouth At Bedtime   Quantity:  30 tablet   Refills:  11        nitroGLYcerin 0.4 MG sublingual tablet   Commonly known as:  NITROSTAT   Used for:  Coronary artery disease involving native coronary artery of native heart with unstable angina pectoris (H)        Dose:  0.4 mg   Place 1 tablet (0.4 mg) under the tongue every 5 minutes as needed for chest pain   Quantity:  30 tablet   Refills:  1        ondansetron 4 MG/5ML solution   Commonly known as:  ZOFRAN        Dose:  4 mg   5 mLs (4 mg) by Per Feeding Tube route 2 times daily as needed for nausea or vomiting   Quantity:  90 mL   Refills:  0        order for DME   Used for:  Fracture of femoral neck,  right, closed, initial encounter, Stroke (H), CHF (congestive heart failure), NYHA class IV (H)        Equipment being ordered: Lift chair.  Please see indications and face-to-face encounter details in 2/3/15 Office Visit note.   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   Quantity:  1 each   Refills:  0        * order for DME   Used for:  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), Displaced fracture of right femoral neck, closed, with nonunion, subsequent encounter        Equipment being ordered: Wheelchair, manual, with elevated leg rests and tilt in space back.  Please fax to [facility]; I called them 11/26/16 and they requested the new order.  Face to face is in my 10/26/16 note.   Quantity:  1 each   Refills:  0        * order for DME   Used for:   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)        Equipment being ordered: Cushioned heel boots.   Quantity:  2 each   Refills:  0        * order for DME   Used for:  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)        Bilateral heel protective cushioning boots.  Dx: previous stroke with left hemiplegia.  Duration of need: 99 months.   Quantity:  2 each   Refills:  0        order for DME   Used for:  Pulmonary atelectasis        Equipment being ordered: Nebulizer   Quantity:  1 each   Refills:  11        pantoprazole 2 mg/mL Susp suspension   Commonly known as:  PROTONIX   Used for:  Cerebrovascular accident (CVA), unspecified mechanism (H), LVAD (left ventricular assist device) present (H)        Dose:  20 mg   10 mLs (20 mg) by Oral or Feeding Tube route 2 times daily   Quantity:  400 mL   Refills:  3        protein modular   Used for:  Cerebrovascular accident (CVA), unspecified mechanism (H)        Dose:  1 packet   1 packet by Per Feeding Tube route 3 times daily   Quantity:  90 packet   Refills:  3        senna-docusate 8.6-50 MG per tablet   Commonly known as:  SENOKOT-S;PERICOLACE        Dose:  1-2 tablet   Take 1-2 tablets by mouth 2 times daily as needed for constipation   Refills:  0        simethicone 80 MG chewable tablet   Commonly known as:  MYLICON        Dose:  80 mg   Take 80 mg by mouth every 6 hours as needed for flatulence or cramping   Refills:  0        tamsulosin 0.4 MG capsule   Commonly known as:  FLOMAX   Used for:  Incomplete bladder emptying        Dose:  0.8 mg   Take 2 capsules (0.8 mg) by mouth daily   Quantity:  60 capsule   Refills:  11        thiamine 100 MG tablet   Used for:  Cerebrovascular accident (CVA) due to embolism of right middle cerebral artery (H)        Dose:  100 mg   Take 1 tablet (100 mg) by mouth daily   Quantity:  90 tablet   Refills:  3        * Notice:  This list has 9 medication(s) that are the same as  "other medications prescribed for you. Read the directions carefully, and ask your doctor or other care provider to review them with you.      STOP taking     cephalexin 250 MG/5ML suspension   Commonly known as:  KEFLEX           losartan 2.5 mg/mL Susp   Commonly known as:  COZAAR                   Summary of Visit     Reason for your hospital stay       You were admitted to the hospital for an infection in your bladder. You will discharge to home on an oral antibiotic for further treatment. Please notify your Cardiologist for fever, chills, worsening abdominal distention, chest pain, worsening shortness of breath, and recurrent vomiting.    Questions and [phone].   First press #1 for the [facility] and then press #3 for \"Medical Questions\" to reach us Cardiology Nurses.   On Call Cardiologist for after hours or on weekends: [phone] option #4 and ask to speak to the on-call Cardiologist. Inform them you are a CORE/heart failure patient at the [facility].             After Care     Activity       Your activity upon discharge: activity as tolerated       Adult Formula Drip Feeding       Nepro at 50 ml/hr for 24 hours.   Free water 30 ml every 4 hours       Diet       Follow this diet upon discharge: - Low sodium diet, Dysphagia level II with thin liquids       Wound care and dressings       Instructions to care for your wound at home: LVAD dressing change as prior to admission.             Referrals     Home care nursing referral       [facility]  Phone: [phone]   Fax: [phone]    For resumption of Palliative Care Program.   RN eval post hospitalization.   Assess: vital signs, respiratory and cardiac status, activity tolerance, hydration, nutritional status.   Med set up and management.  INR lab draws; with results to the [facility], next due on 2018    Your provider has ordered home care nursing services. If you have not been contacted within 2 days of " your discharge please call the inpatient department phone number at [phone] .       Home infusion referral       Your provider has referred you to:   [facility]   Phone: [phone]     For home tube feeding supplies.   Formula: Nepro at 50 ml/hr continuous   Free water 30 ml every 4 hours   Route: Nasoduodenal tube       Medication Therapy Management Referral       MTM referral reason            Patient had a hospital or ED visit in last 6 months and has more than 10   PTA or Discharge medications    Patient has 5 PTA or Discharge Medications AND one of the following   diagnoses: DM,HF,COPD,AMI DX,PULM HTN       This service is designed to help you get the most from your medications.  A specially trained pharmacist will work closely with you and your doctors  to solve any problems related to your medications and to help you get the   best results from taking them.      The Medication Therapy Management staff will call you to schedule an appointment.             Your next 10 appointments already scheduled     Jul 17, 2018  1:30 PM CDT   Lab with  LAB   [facility] ([facility])    [address]  1st Floor  [place] [id number] [phone]            Jul 17, 2018  2:00 PM CDT   (Arrive by 1:45 PM)   Ventricular Assist Device with Maureen Grant NP   [facility] ([facility])    [address]  [address]  [place] [id number] [phone]            Jul 18, 2018  1:00 PM CDT   Home Follow Up with Raghu Almodovar MD   [facility] ([facility])    [address]  [place] [facility] [id number] [phone]              Follow-Up Appointment Instructions     Future Labs/Procedures    Follow Up and recommended labs and tests     Comments:    INR Friday per [facility]HC RN.  Repeat BMP Monday per [facility] RN  Follow up with Maureen Grant 7/18/18 at 2 PM.      Follow-Up Appointment Instructions      Follow Up and recommended labs and tests       INR Friday per [facility] RN.  Repeat BMP Monday per [facility] RN  Follow up with Maureen Grant 7/18/18 at 2 PM.             Statement of Approval     Ordered          07/12/18 0118  I have reviewed and agree with all the recommendations and orders detailed in this document.  EFFECTIVE NOW     Approved and electronically signed by:  Zuleika Patterson APRN CNP

## 2018-07-08 NOTE — ED TRIAGE NOTES
BIBA from home for shortness of breath, productive cough and UTI symptoms. Pt has been more lethargic lately but arouses to voice today. Pt recently in ED for chest pain and was discharged with meds for UTI. Had stroke about a month ago, since then patient not oriented all the time.  en route. Received 100ml fluid en route.

## 2018-07-08 NOTE — ED PROVIDER NOTES
History     Chief Complaint   Patient presents with     Shortness of Breath     HPI  Sohan Rendon is a 68 year old male who comes in from home for not feeling well.  The history is provided by the patient's daughter as the patient is essentially nonverbal and non ambulatory after previous stroke.  Patient has an LVAD for history of congestive heart failure.  Patient's daughter reports that he simply did not look well to her and he seemed like he was uncomfortable.  She denies any noted fevers.  Patient was seen in this emergency department on July 5 and at that time was diagnosed with a urinary tract infection.  He was prescribed Keflex.  The urine culture results are now available and culture is positive for greater than 100,000 colonies of VRE sensitive to the Linezolid.  Patient's daughter reports that he had some purulent discharge from the penis today.  He had some vomiting yesterday but has not vomited today.  Patient's daughter reports that they stopped his tube feeding yesterday.    I have reviewed the Medications, Allergies, Past Medical and Surgical History, and Social History in the [employer] system.    Past Medical History:   Diagnosis Date     Acute right MCA stroke (H) 6/2013    With L sided hemiparesis      Anemia of chronic disease     Baseline Hb 8-9     BPH (benign prostatic hyperplasia)      CHF (congestive heart failure), NYHA class IV (H) 10/9/2012    s/p HeartMate II.  Was on milrinone and dobutamine prior to LVAD      CKD (chronic kidney disease)     Baseline Cr=     Clostridium difficile colitis 12/2012      Dysphagia     PEG tube placed in 2012.  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  Normal carotid duplex 3/2014.       Esophagitis 12/2012    EGD with esophagitis and multiple douenal ulcers     Fracture of femoral neck, right, closed (H) 2/3/2015    Presumed pathologic as patient is  non-weight-bearing and suffered no trauma.  Family declined operative repair during hospital stay 1/23 - 1/30/15.     Gastric and duodenal angiodysplasia with hemorrhage 6/18/2015     GERD (gastroesophageal reflux disease)      Gout      Hematuria      HTN (hypertension)     LDL=59 (3/29/2014)     Hyperlipaemia      Ischemic cardiomyopathy      Mitral regurgitation     s/p MVR with Carbomedics ring      Myocardial infarction 1998    In [facility] without intervention      Nonsenile cataract     BE     PFO (patent foramen ovale)     s/p closure (10/9/2012)     TB lung, latent     s/p 9 months INH in 2012        Past Surgical History:   Procedure Laterality Date     C CABG, VEIN, FIVE  2001     CATARACT IOL, RT/LT Right 11/19/2015     ENDOSCOPIC RETROGRADE CHOLANGIOPANCREATOGRAM N/A 5/9/2017    Procedure: COMBINED ENDOSCOPIC RETROGRADE CHOLANGIOPANCREATOGRAPHY, PLACE TUBE/STENT;  Endoscopic Retrograde Cholangiopancreatogram, Stent (Zimmon Biliary 7fr 12cm) Placement;  Surgeon: Cristo Grove MD;  Location: [facility] OR     ESOPHAGOSCOPY, GASTROSCOPY, DUODENOSCOPY (EGD), COMBINED N/A 6/10/2015    Procedure: COMBINED ESOPHAGOSCOPY, GASTROSCOPY, DUODENOSCOPY (EGD);  Surgeon: Edu Jenkins MD;  Location: [facility] GI     ESOPHAGOSCOPY, GASTROSCOPY, DUODENOSCOPY (EGD), COMBINED N/A 6/15/2015    Procedure: COMBINED ESOPHAGOSCOPY, GASTROSCOPY, DUODENOSCOPY (EGD);  Surgeon: Yury Bonner MD;  Location: [facility] OR     H INSERT ICD  2/10/2011    And pacemaker.  Not BiV     INSERT VENTRICULAR ASSIST DEVICE LEFT (HEARTMATE II)  10/9/2012     IR GASTRO JEJUNOSTOMY TUBE PLACEMENT       PHACOEMULSIFICATION CLEAR CORNEA WITH STANDARD INTRAOCULAR LENS IMPLANT Right 11/19/2015    Procedure: PHACOEMULSIFICATION CLEAR CORNEA WITH STANDARD INTRAOCULAR LENS IMPLANT;  Surgeon: Violeta Cosme MD;  Location: [facility] OR     REPAIR PATENT FORAMEN OVALE  10/9/2012     REPAIR VALVE MITRAL  2/9/2012    28 mm Carbomedics 2/3 ring        Family History    Problem Relation Age of Onset     Family History Negative No family hx of      Glaucoma No family hx of      Macular Degeneration No family hx of      Cancer No family hx of      no skin cancer       Social History   Substance Use Topics     Smoking status: Former Smoker     Smokeless tobacco: Former User     Alcohol use No         Review of Systems   Unable to perform ROS: Patient nonverbal       Physical Exam   BP: (!) 69/41  Heart Rate: 111  Temp: 97.6  F (36.4  C)  Resp: 22  Weight: 83.9 kg (185 lb)  SpO2: 97 %      Physical Exam    GEN:  Alert, well developed, no acute distress  HEENT:  PERRL, EOMI, Mucous membranes are moist.   Cardio: Mechanical hum sound from the LVAD, radial pulses equal bilaterally  PULM:  Lungs clear, good air movement, no wheezes, rales   Abd:  Soft, normal bowel sounds, no focal tenderness  Back exam:  No bedsores or wounds on the back or buttocks.  Musculoskeletal: There is atrophy of the legs, no lower extremity swelling or calf tenderness, normal range of motion of the arms.  Neuro:  Alert, not following commands or answering questions  Skin:  Warm, dry   ED Course     ED Course     Procedures             EKG Interpretation:      Interpreted by Lily Garcia  Time reviewed: 14:00  Symptoms at time of EKG: doesn't feel well   Rhythm: Computer reads undetermined rhythm.  I think the patient has sinus rhythm with PVCs however P waves are difficult to see  Rate: 112  Axis: left  Ectopy: none  Conduction: normal  ST Segments/ T Waves: No ST-T wave changes compared to prior EKG on July 5, 2018  Q Waves: III, F, unchanged  Comparison to prior: Unchanged from 7/5/18    Clinical Impression: Unchanged EKG      Chest x-ray was reviewed by me and results are shown here:  Results for orders placed or performed during the hospital encounter of 07/08/18   XR Chest 1 View    Narrative    XR CHEST 1 VW  7/8/2018 4:22 PM      HISTORY: Shortness of breath;     COMPARISON:  7/5/2018    FINDINGS: Left ventricular assist device. Sternotomy wires.  Mediastinal surgical clips. Left chest implanted cardiac defibrillator  lead projects over the right ventricle. Enlarged cardiac silhouette.  Increased pulmonary vascular congestion. No pneumothorax. Bilateral  pleural effusions. No acute osseous abnormalities.      Impression    IMPRESSION:   1. Pulmonary vascular congestion/pulmonary edema has increased from  comparison radiographs 7/5/2018.  2. Small bilateral pleural effusions.  3. Enlarged cardiac silhouette.    I have personally reviewed the examination and initial interpretation  and I agree with the findings.    IGNACIO SILVA MD     *Note: Due to a large number of results and/or encounters for the requested time period, some results have not been displayed. A complete set of results can be found in Results Review.     Patient was discussed with cardiology.  Since the patient has had decreased p.o. intake and now has an increase in his creatinine has no sign of fluid overload clinically, such as with JVD or other edema, cardiology recommended a 500 cc fluid bolus.  This was done in the ED and patient's heart rate remains the same, continues to be around 100-105.  Patient's family feels that patient appears to be having some increase in respiratory effort compared to baseline.  Patient was given a dose of linezolid in the emergency department for VRE urinary tract infection.    Critical Care time:  none  Labs are normal except as shown.  Patient's hemoglobin is low, however this appears to be close to his baseline.  Patient's creatinine is increased compared to baseline of around 1.5.  His bicarb also is slightly low and his BUN is increased.    Labs Ordered and Resulted from Time of ED Arrival Up to the Time of Departure from the ED   CBC WITH PLATELETS DIFFERENTIAL - Abnormal; Notable for the following:        Result Value    RBC Count 2.74 (*)     Hemoglobin 7.5 (*)     Hematocrit 25.3  (*)     MCHC 29.6 (*)     RDW 22.6 (*)     Absolute Eosinophils 0.8 (*)     All other components within normal limits   COMPREHENSIVE METABOLIC PANEL - Abnormal; Notable for the following:     Carbon Dioxide 17 (*)     Glucose 163 (*)     Urea Nitrogen 39 (*)     Creatinine 2.13 (*)     GFR Estimate 31 (*)     GFR Estimate If Black 38 (*)     Calcium 7.7 (*)     Albumin 2.6 (*)     AST 58 (*)     All other components within normal limits   INR - Abnormal; Notable for the following:     INR 2.08 (*)     All other components within normal limits   LACTATE DEHYDROGENASE - Abnormal; Notable for the following:     Lactate Dehydrogenase 1192 (*)     All other components within normal limits   LACTIC ACID WHOLE BLOOD   PERIPHERAL IV CATHETER   PULSE OXIMETRY NURSING   CARDIAC CONTINUOUS MONITORING   NURSING DRAW AND HOLD   NURSING DRAW AND HOLD   NURSING DRAW AND HOLD   DISCHARGE PLANNING   BLOOD CULTURE   BLOOD CULTURE       Consults  Cardiology: Responded (07/08/18 [id number])    Assessments & Plan (with Medical Decision Making)   This is a patient with history of heart failure treated with an LVAD who has had previous strokes and is essentially nonverbal and nonambulatory.  Family brought him in because he appeared to be uncomfortable and appeared to have increased respiratory effort.  They had also received a telephone call indicating that he has an infection with VRE in the urine.  Patient has not been febrile, but he has had vomiting at home and his family has stopped his tube feeding since yesterday.  Patient likely does have a fluid deficit given the increase in creatinine and low bicarb.  Fluid resuscitation will need to be slow and care will need to be taken not to cause pulmonary edema.  Patient will be admitted to the cardiology service for further care.  Cardiology plans to continue the linezolid for his urinary tract infection.    I have reviewed the nursing notes.    I have reviewed the findings, diagnosis, plan  and need for follow up with the patient.    New Prescriptions    No medications on file       Final diagnoses:   Urinary tract infection without hematuria, site unspecified   Acute kidney injury (H)   LVAD (left ventricular assist device) present (H)   SOB (shortness of breath)       7/8/2018   [facility], [place], EMERGENCY DEPARTMENT     Lily Garcia MD  07/08/18 7196

## 2018-07-08 NOTE — ED NOTES
Unable to obtain pulse oximeter reading after trying finger, ear and sticky probe. MD updated, OK to chart spot checks when readable.

## 2018-07-08 NOTE — IP AVS SNAPSHOT
[address] [address]    Phone:  [phone]                                       After Visit Summary   7/8/2018    Sohan Rendon    MRN: [id number]           After Visit Summary Signature Page     I have received my discharge instructions, and my questions have been answered. I have discussed any challenges I see with this plan with the nurse or doctor.    ..........................................................................................................................................  Patient/Patient Representative Signature      ..........................................................................................................................................  Patient Representative Print Name and Relationship to Patient    ..................................................               ................................................  Date                                            Time    ..........................................................................................................................................  Reviewed by Signature/Title    ...................................................              ..............................................  Date                                                            Time           Stable

## 2018-07-08 NOTE — LETTER
Transition Communication Hand-off for Care Transitions to Next Level of Care Provider    Name: Sohan Rendon  : 1950  MRN #: [id number]  Primary Care Provider: Shilpi Agosto     Primary Clinic: [address] [address]     Reason for Hospitalization:  SOB (shortness of breath) [R06.02]  Acute kidney injury (H) [N17.9]  LVAD (left ventricular assist device) present (H) [Z95.811]  Urinary tract infection without hematuria, site unspecified [N39.0]  Admit Date/Time: 2018  1:52 PM  Discharge Date: 2018  Payor Source: Payor: [employer] / Plan: [employer] / Product Type: HMO /     Readmission Assessment Measure (LEANDRO) Risk Score/category: Very High  Reason for Communication Hand-off Referral: Continuity of care    Discharge Plan: Discharge to home with [facility]    Discharge Needs Assessment:  Needs       Most Recent Value    Other Resources Other (see comment)    Home Care [facility] [phone], Fax: [phone]    Other -- [[facility]: [phone]]        Follow-up specialty is recommended: Yes    Follow-up plan:  Future Appointments  Date Time Provider Department Center   2018 6:00 AM Rachel Dill, SLP [facility]   2018 1:30 PM  LAB [facility]   2018 2:00 PM Maureen Grant NP [facility]   2018 1:00 PM Raghu Almodovar MD [facility]       Any outstanding tests or procedures:        Referrals     Future Labs/Procedures    Home care nursing referral     Comments:    [facility]  Phone: [phone]   Fax: [phone]    For resumption of Palliative Care Program.   RN eval post hospitalization.   Assess: vital signs, respiratory and cardiac status, activity tolerance, hydration, nutritional status.   Med set up and management.  INR lab draws; with results to the [facility], next due on 2018    Your provider has  ordered home care nursing services. If you have not been contacted within 2 days of your discharge please call the inpatient department phone number at [phone] .    Home infusion referral     Comments:    Your provider has referred you to:   [facility]   Phone: [phone]     For home tube feeding supplies.   Formula: Nepro at 50 ml/hr continuous   Free water 30 ml every 4 hours   Route: Nasoduodenal tube    Medication Therapy Management Referral     Comments:    MTM referral reason            Patient had a hospital or ED visit in last 6 months and has more than 10   PTA or Discharge medications    Patient has 5 PTA or Discharge Medications AND one of the following   diagnoses: DM,HF,COPD,AMI DX,PULM HTN       This service is designed to help you get the most from your medications.  A specially trained pharmacist will work closely with you and your doctors  to solve any problems related to your medications and to help you get the   best results from taking them.      The Medication Therapy Management staff will call you to schedule an appointment.            Key Recommendations:  Post hospitalization follow up as scheduled. INR lab draw Friday 7/13 to be drawn by home care.  Joanna Roberts RN BSN  [address] Unit Care Coordinator  Phone number: [phone]  Pager: [phone]

## 2018-07-09 NOTE — PHARMACY-ANTICOAGULATION SERVICE
Clinical Pharmacy - Warfarin Dosing Consult     Pharmacy has been consulted to manage this patient s warfarin therapy.  Indication: LVAD/RVAD  Therapy Goal:  INR 1.8-2.5  Warfarin Prior to Admission: Yes  Warfarin PTA Regimen: 7/2: 6 mg; 7/3: 6 mg; 7/4: 6 mg; 7/5: 6 mg; 7/6: 6 mg; 7/7: 5 mg; 7/8: 6 mg; 7/9: INR    INR   Date Value Ref Range Status   07/08/2018 2.08 (H) 0.86 - 1.14 Final   07/05/2018 1.45 (H) 0.86 - 1.14 Final     Chromogenic Factor 10   Date Value Ref Range Status   08/10/2014 24 (L) 70 - 130 % Final     Comment:     Therapeutic Range:  A Chromogenic Factor 10 level of approximately 20-40%   inversely correlates with an INR of 2-3 for patients receiving Warfarin.   Chromogenic Factor 10 levels below 20% indicate an INR greater than 3 and   levels above 40% indicate an INR less than 2.       Factor 2 Assay   Date Value Ref Range Status   07/21/2014 25 (L) 60 - 140 % Final     Comment:     Analyte Specific Reagents (ASRs) are used in many laboratory tests necessary   for   standard medical care and generally do not require FDA approval.  This test   was   developed and its performance characteristics determined by [facility].  It has not been cleared or approved by   the [place] Food and Drug Administration.       Recommend warfarin 6 mg today.      Pharmacy will monitor Sohan Rendon daily and order warfarin doses to achieve specified goal.      Please contact pharmacy as soon as possible if the warfarin needs to be held for a procedure or if the warfarin goals change.      Jessica Weaver, PharmD, BCPS

## 2018-07-09 NOTE — H&P
History and Physical    Cards 2- obs     Date of Service: 18  Date of Admission: 2018  Patient Name: Sohan Rendon  : 1950  MRN: [id number]       Chief complaint:   VRE     History of Present Illness:   Sohan Rendon is a 67 yo M with a history of ICM s/p HM II in  as destination therapy with a course complicated by hemolysis, pump thrombosis and multiple strokes (subcortical, R PICA, R MCA), PFO s/p closure in ,MVr with carbomedics ring,  ICD placement in , latent TB, PFO s/p closure in  and CKD, chronic R femoral fracture who is admitted with weakness and malaise and concern for UTI after a UCx grew VRE.    Mr Rendon was recently admitted from - with a new MCA stroke c/b aphasia for which he had to be started on tube feeds on discharge he was somewhat more alert and the plan was to reassess to discontinue TF once he was able to eat better. His hospitalization was c/b a UTI with VRE for which he received a 5d course of linezolid per ID recommendations. They discontinued tube feeds yesterday.    He then presented to the hospital on  with chest pain and increased cough production and decreased UOP. Labs were significant for a SCr of 1.7 . hois UA was positive and after questioning family mentioned dysuria. They mindy a urine culture and prescribed him keflex.He had a CT of his abdomen that showed non obstructive gas pattern stable support devices and L>R bibasilar pulmonary opacities.  The UCx from that day is positive for VRE sensitive to linezolid so they called the patient's family and told him to come to the ED.      On presentation he HR was tachy in the 110's MAP's  In the high 60's -70's. His labs are significant for a WBC of 9.2 (was 10 prior to d/c ) BUN 39 and SCr 2.13 (up from 42/1.77 two days ago sodium 134 albumin 2.6, LDH is 1192 in the setting of a known LVAD thrombus and procalcitonin is negative. CXR shows some pulmonary congestion and small b/l  effusions.      His daughter does note some coughing with whitish sputum and orthopnea with occasional wheezing no fevers.     Review of Symptoms:     Comprehensive 10 point review of systems was negative unless otherwise noted in the HPI.- had some discharge whitish from his penis today family otherwise notes no changes     Past Medical History:     Past Medical History:   Diagnosis Date     Acute right MCA stroke (H) 6/2013    With L sided hemiparesis      Anemia of chronic disease     Baseline Hb 8-9     BPH (benign prostatic hyperplasia)      CHF (congestive heart failure), NYHA class IV (H) 10/9/2012    s/p HeartMate II.  Was on milrinone and dobutamine prior to LVAD      CKD (chronic kidney disease)     Baseline Cr=     Clostridium difficile colitis 12/2012      Dysphagia     PEG tube placed in 2012.  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  Normal carotid duplex 3/2014.       Esophagitis 12/2012    EGD with esophagitis and multiple douenal ulcers     Fracture of femoral neck, right, closed (H) 2/3/2015    Presumed pathologic as patient is non-weight-bearing and suffered no trauma.  Family declined operative repair during hospital stay 1/23 - 1/30/15.     Gastric and duodenal angiodysplasia with hemorrhage 6/18/2015     GERD (gastroesophageal reflux disease)      Gout      Hematuria      HTN (hypertension)     LDL=59 (3/29/2014)     Hyperlipaemia      Ischemic cardiomyopathy      Mitral regurgitation     s/p MVR with Carbomedics ring      Myocardial infarction 1998    In [facility] without intervention      Nonsenile cataract     BE     PFO (patent foramen ovale)     s/p closure (10/9/2012)     TB lung, latent     s/p 9 months INH in 2012        Past Surgical History:   Procedure Laterality Date     C CABG, VEIN, FIVE  2001     CATARACT IOL, RT/LT Right 11/19/2015     ENDOSCOPIC RETROGRADE CHOLANGIOPANCREATOGRAM N/A  5/9/2017    Procedure: COMBINED ENDOSCOPIC RETROGRADE CHOLANGIOPANCREATOGRAPHY, PLACE TUBE/STENT;  Endoscopic Retrograde Cholangiopancreatogram, Stent (Zimmon Biliary 7fr 12cm) Placement;  Surgeon: Cristo Grove MD;  Location: [facility] OR     ESOPHAGOSCOPY, GASTROSCOPY, DUODENOSCOPY (EGD), COMBINED N/A 6/10/2015    Procedure: COMBINED ESOPHAGOSCOPY, GASTROSCOPY, DUODENOSCOPY (EGD);  Surgeon: Edu Jenkins MD;  Location: [facility] GI     ESOPHAGOSCOPY, GASTROSCOPY, DUODENOSCOPY (EGD), COMBINED N/A 6/15/2015    Procedure: COMBINED ESOPHAGOSCOPY, GASTROSCOPY, DUODENOSCOPY (EGD);  Surgeon: Yury Bonner MD;  Location: [facility] OR     H INSERT ICD  2/10/2011    And pacemaker.  Not BiV     INSERT VENTRICULAR ASSIST DEVICE LEFT (HEARTMATE II)  10/9/2012     IR GASTRO JEJUNOSTOMY TUBE PLACEMENT       PHACOEMULSIFICATION CLEAR CORNEA WITH STANDARD INTRAOCULAR LENS IMPLANT Right 11/19/2015    Procedure: PHACOEMULSIFICATION CLEAR CORNEA WITH STANDARD INTRAOCULAR LENS IMPLANT;  Surgeon: Violeta Cosme MD;  Location: [facility] OR     REPAIR PATENT FORAMEN OVALE  10/9/2012     REPAIR VALVE MITRAL  2/9/2012    28 mm Carbomedics 2/3 ring         Allergies:     Allergies   Allergen Reactions     Seasonal Allergies         Outpatient Medications:       No current facility-administered medications on file prior to encounter.   Current Outpatient Prescriptions on File Prior to Encounter:  acetaminophen (TYLENOL) 32 mg/mL solution 20.3 mLs (650 mg) by Oral or Feeding Tube route every 6 hours as needed for mild pain   aspirin 325 MG tablet Take 1 tablet (325 mg) by mouth daily   ALEK CONTOUR test strip USE TO TEST BLOOD SUGAR 2 TIMES DAILY OR AS DIRECTED.   bisacodyl (DULCOLAX) 10 MG Suppository Place 1 suppository (10 mg) rectally daily as needed for constipation   blood glucose monitoring (ALEK MICROLET) lancets USE TO TEST BLOOD SUGAR 2 TIMES DAILY OR AS DIRECTED   blood glucose monitoring (NO BRAND SPECIFIED) lancets Use to test blood  sugars 2 times daily or as directed.   cephalexin (KEFLEX) 250 MG/5ML suspension Take 10 mLs (500 mg) by mouth 4 times daily for 7 days   finasteride (PROSCAR) 5 MG tablet Take 1 tablet (5 mg) by mouth daily Patient needs to be seen in clinic prior to additional refills.   insulin isophane human (HUMULIN N PEN) 100 UNIT/ML injection Inject 60 Units Subcutaneous every 24 hours   levETIRAcetam (KEPPRA) 100 MG/ML solution Take 7.5 mLs (750 mg) by mouth every 12 hours   loratadine (CLARITIN) 5 MG/5ML syrup 10 mLs (10 mg) by Oral or Feeding Tube route daily   losartan (COZAAR) 2.5 mg/mL SUSP 10 mLs (25 mg) by Oral or Feeding Tube route daily   Melatonin 10 MG TABS tablet Take 1 tablet (10 mg) by mouth At Bedtime   nitroGLYcerin (NITROSTAT) 0.4 MG sublingual tablet Place 1 tablet (0.4 mg) under the tongue every 5 minutes as needed for chest pain   ondansetron (ZOFRAN ODT) 4 MG ODT tab Take 1-2 tablets (4-8 mg) by mouth every 8 hours as needed for nausea   ondansetron (ZOFRAN) 4 MG/5ML solution 5 mLs (4 mg) by Per Feeding Tube route 2 times daily as needed for nausea or vomiting   order for DME Equipment being ordered: Nebulizer   order for DME Bilateral heel protective cushioning boots.  Dx: previous stroke with left hemiplegia.  Duration of need: 99 months.   order for DME Equipment being ordered: Cushioned heel boots.   order for DME Equipment being ordered: Wheelchair, manual, with elevated leg rests and tilt in space back.  Please fax to [facility]; I called them 11/26/16 and they requested the new order.  Face to face is in my 10/26/16 note.   order for DME Equipment being ordered: Wheelchair, manual.   order for DME Equipment being ordered: Hospital Bed, fully electric.   order for DME Equipment being ordered: Reclining manual w/c with bilateral elevating leg rests.   order for DME Equipment being ordered: Bilateral leg supports for manual wheelchair.   ORDER FOR DME Equipment being ordered: Lift chair.Please see  indications and face-to-face encounter details in 2/3/15 Office Visit note.   pantoprazole (PROTONIX) SUSP suspension 10 mLs (20 mg) by Oral or Feeding Tube route 2 times daily   protein modular (PROSOURCE TF) 1 packet by Per Feeding Tube route 3 times daily   senna-docusate (SENOKOT-S;PERICOLACE) 8.6-50 MG per tablet Take 1-2 tablets by mouth 2 times daily as needed for constipation   simethicone (MYLICON) 80 MG chewable tablet Take 80 mg by mouth every 6 hours as needed for flatulence or cramping   tamsulosin (FLOMAX) 0.4 MG capsule Take 2 capsules (0.8 mg) by mouth daily   thiamine (VITAMIN B-1) 100 MG tablet Take 1 tablet (100 mg) by mouth daily   warfarin (COUMADIN) 1 MG tablet Take 3 tablets (3 mg) by mouth daily Or as directed after INR dosing changes        Family History:     Family History   Problem Relation Age of Onset     Family History Negative No family hx of      Glaucoma No family hx of      Macular Degeneration No family hx of      Cancer No family hx of      no skin cancer        Social History:     Social History   Substance Use Topics     Smoking status: Former Smoker     Smokeless tobacco: Former User     Alcohol use No          Physical Exam:   Blood pressure (!) 86/53, temperature 97.6  F (36.4  C), temperature source Axillary, resp. rate 17, weight 83.9 kg (185 lb), SpO2 99 %.  Temp (24hrs), Av.6  F (36.4  C), Min:97.6  F (36.4  C), Max:97.6  F (36.4  C)      Gen: no acute distress, aphasic non responding with ;sided weakness  HEENT: no scleral icterus, pupils equal and reactive to light, moist mucous membranes, no nasal discharge.  NECK: JVP not elevated  CARDIOVASCULAR:LVAD humm  RESPIRATORY: clear to auscultation bilaterally, no rales, rhonchi or wheezes  ABDOMEN: soft, non-tender abdomen without rebound or guarding, no hepatosplenomegaly, no palpable masses, bowel sounds present, LVAD driveline with clean dressing  EXTREMITIES: peripheral pulses normal, no peripheral edema, warm,  capillary refill < 2 seconds  Skin: no ecchymoses, no rashes  NEURO: aphasic non verbal non responsive to voice or commands-unchanged from baseline     Data:   CMP  Recent Labs  Lab 07/08/18  1532 07/05/18 2036    139   POTASSIUM 3.9 4.3   CHLORIDE 107 112*   CO2 17* 22   ANIONGAP 11 6   * 135*   BUN 39* 42*   CR 2.13* 1.77*   GFRESTIMATED 31* 39*   GFRESTBLACK 38* 47*   JOSÉ 7.7* 8.1*   PROTTOTAL 6.9  --    ALBUMIN 2.6*  --    BILITOTAL 0.6  --    ALKPHOS 88  --    AST 58*  --    ALT 27  --      CBC  Recent Labs  Lab 07/08/18  1532 07/05/18 2036   WBC 9.2 10.0   RBC 2.74* 2.80*   HGB 7.5* 7.8*   HCT 25.3* 27.0*   MCV 92 96   MCH 27.4 27.9   MCHC 29.6* 28.9*   RDW 22.6* 22.3*    209     INR  Recent Labs  Lab 07/08/18  1642 07/05/18 2036 07/05/18 07/02/18   INR 2.08* 1.45* 2.00 1.3     Arterial Blood GasNo lab results found in last 7 days.     EKG:  NSR with intermittent PVC's      MICRO:   BCx negative to date 7/8  UCx 7/5- >100,000colonies of VRE sensitive to linezolid  UCx 6/16- VRE  UCx 6/9- Enterobacter cloacae     Imaging:    CXR mild pulmonary congestion      Echo11/2017  HM II LVAD Study at 8800 RPM. Technically difficult due to extremely poor  acoustic windows.     Left ventricular wall thickness and size is normal (LVEDD = 5.6 cm; LVESD =  4.9 cm).  Visual estimate of LVEF is <30% which is severely reduced.  Inflow cannula visualized in LV with non-pulsatile flow by PW spectral Doppler  at velocities < 20 cm/s and no color flow demonstrated at a Nyquist limit of  60 cm/s. In one view there is a small echodensity which is probable artifact.  Clinical correlation required. Outflow cannula visualized in the aorta with  low pulsatile flow (PSV 74 cm/s, EDV 30 cm/s) on Spectral Doppler and color  flow demonstrated.  Septum midline with slight bowing towards LV in diastole.  Moderately reduced RV systolic function. Unable to assess RV size but on  limited views appears enlarged.  Aortic  valve poorly visualized but appears closed with no Doppler color flow  across valve.  Mitral annular calcification and mitral valve thickening with no significant  dysfunction.  TV not visualized.  PA systolic pressure 24 mmHg plus RA pressure.  IVC not visualized.  No obvious pericardial effusion.     Assessment/Plan:   67 yo M with a history of ICM s/p HM II in 2012 as destination therapy with a course complicated by hemolysis, pump thrombosis and multiple strokes (subcortical, R PICA, R MCA), PFO s/p closure in 2012,MVr with carbomedics ring,  ICD placement in 2011, latent TB, PFO s/p closure in 2012 and CKD, chronic R femoral fracture who is admitted with weakness and malaise and concern for UTI after a UCx grew VRE.    - VRE UTI  - has a history of VRE in prior urine culture most recently treated with linezolid in his prior hospitalization - ? Contaminant with unclear symptoms though purulent??? Penile discharge?  - will start linezolid for now and follow cultures CBC and discuss with ID in the am  - SCr at baseline fluctuates from1.5-2- got IVF in the ED - CXR with pulmonary edema SCr rising with increased weight and NtproBNP mildly hypervolemic on exam- IV lasix 20mg this am    - heart failure 2/2 ICM s/p HM II as DT in 2012  - Stage D NYHA class III  - complicated by hemolysis, suspected pump thrombosis with persistently elevated LDH and strokes  -PTA cardiac meds are:  losartan 25mg OD, asa 325mg OD, coumadin  -  known pump thrombosis and not a candidate for pump exchange or further therapies  - Anticoagulated with coumadin pta (INR goal 1.8-2.3)- Continue coumadin    History of strokes  - aphasic with baseline weakness, at baseline  - known pump thrombosis on AC  - continue keppra     DM - ISS    Fluids:PO diet  DVT ppx:on coumadin  Stress Ulcer ppx:on protonix  Glycemic Control:ISS  Consults:ID  Code Status: DNR / DNI  Discharge plan: observation status likely tomorrow if labs normalize      Patient to be  formally staffed in the     Jesusita Ayala  Cardiology fellow, PGY-5

## 2018-07-09 NOTE — PHARMACY-ADMISSION MEDICATION HISTORY
"Admission medication history interview status for the 7/8/2018 admission is complete. See Epic admission navigator for allergy information, pharmacy, prior to admission medications and immunization status.     Medication history interview sources: Patient's daughter (patient nonverbal), Fay    Changes made to PTA medication list (reason)  Added: none  Deleted: ondansetron ODT (per daughter, uses liquid), Humulin N (see note below)  Changed: none    Additional medication history information (including reliability of information, actions taken by pharmacist):  -Patient's daughter was a moderate historian, familiar with some of the medications the patient took. Daughter spoke with sister on the phone to confirm names and last doses of some medications. History was also somewhat limited by patient requesting the daughter's attention during the interview.   -Humulin N: per daughter, they were instructed to stop giving him this when tube feeds stopped. She indicated that it was titrated down slowly, with the last dose given about Friday (7/6). A review of recent chart notes also indicated that this was being titrated down.   -Finasteride and tamsulosin: patient's daughter does not recall that they have given him finasteride in a \"long time\", however a refill was sent to [facility]/pharmacy on 7/2 per [facility] (unclear if they retrieved the prescription). It has been left on the medication list for now. Tamsulosin has been held \"until he can swallow\" per daughter (capsule cannot be opened to administer via tube).  - Warfarin: the patient's most recent warfarin instructions were to take 7mg on 7/6, 6mg on 7/7, and 6mg on 7/8. The patient's daughter has not yet given him warfarin today.         Prior to Admission medications    Medication Sig Last Dose Taking? Auth Provider   acetaminophen (TYLENOL) 32 mg/mL solution 20.3 mLs (650 mg) by Oral or Feeding Tube route every 6 hours as needed for mild pain Past Week at Unknown time " Yes Maureen Grant NP   aspirin 325 MG tablet Take 1 tablet (325 mg) by mouth daily 7/8/2018 at 1500 Yes Shilpi Agosto APRN CNP   bisacodyl (DULCOLAX) 10 MG Suppository Place 1 suppository (10 mg) rectally daily as needed for constipation Past Month at Unknown time Yes Gabe Peters MD   cephalexin (KEFLEX) 250 MG/5ML suspension Take 10 mLs (500 mg) by mouth 4 times daily for 7 days 7/8/2018 at AM Yes Abilio Bhatt DO   finasteride (PROSCAR) 5 MG tablet Take 1 tablet (5 mg) by mouth daily Patient needs to be seen in clinic prior to additional refills. Unknown at unknown Yes Lenore Gardner PA   levETIRAcetam (KEPPRA) 100 MG/ML solution Take 7.5 mLs (750 mg) by mouth every 12 hours 7/8/2018 at AM Yes Volodymyr Champion MD   loratadine (CLARITIN) 5 MG/5ML syrup 10 mLs (10 mg) by Oral or Feeding Tube route daily 7/8/2018 at AM Yes Jesusita Kaiser MD   losartan (COZAAR) 2.5 mg/mL SUSP 10 mLs (25 mg) by Oral or Feeding Tube route daily 7/8/2018 at AM Yes Jesusita Kaiser MD   Melatonin 10 MG TABS tablet Take 1 tablet (10 mg) by mouth At Bedtime 7/7/2018 at PM Yes Raghu Almodovar MD   nitroGLYcerin (NITROSTAT) 0.4 MG sublingual tablet Place 1 tablet (0.4 mg) under the tongue every 5 minutes as needed for chest pain 7/5/2018 at Unknown time Yes Raghu Almodovar MD   ondansetron (ZOFRAN) 4 MG/5ML solution 5 mLs (4 mg) by Per Feeding Tube route 2 times daily as needed for nausea or vomiting 7/7/2018 at Unknown time Yes Harinder Tellez MD   pantoprazole (PROTONIX) SUSP suspension 10 mLs (20 mg) by Oral or Feeding Tube route 2 times daily 7/8/2018 at AM Yes Jesusita Kaiser MD   senmike-docusate (SENOKOT-S;PERICOLACE) 8.6-50 MG per tablet Take 1-2 tablets by mouth 2 times daily as needed for constipation 7/8/2018 at AM Yes Unknown, Entered By History   simethicone (MYLICON) 80 MG chewable tablet Take 80 mg by mouth every 6 hours  as needed for flatulence or cramping 7/8/2018 at Unknown time Yes Unknown, Entered By History   tamsulosin (FLOMAX) 0.4 MG capsule Take 2 capsules (0.8 mg) by mouth daily Past Month at Unknown time Yes Raghu Almodovar MD   thiamine (VITAMIN B-1) 100 MG tablet Take 1 tablet (100 mg) by mouth daily 7/7/2018 at Unknown time Yes Raghu Almodovar MD   warfarin (COUMADIN) 1 MG tablet Take 3 tablets (3 mg) by mouth daily Or as directed after INR dosing changes 7/7/2018 at PM Yes Raghu Almodovar MD   ALEK CONTOUR test strip USE TO TEST BLOOD SUGAR 2 TIMES DAILY OR AS DIRECTED.   Thomas Dill MD   blood glucose monitoring (ALEK MICROLET) lancets USE TO TEST BLOOD SUGAR 2 TIMES DAILY OR AS DIRECTED   Thomas Dill MD   blood glucose monitoring (NO BRAND SPECIFIED) lancets Use to test blood sugars 2 times daily or as directed.   Thomas Dill MD   order for DME Equipment being ordered: Nebulizer   Zuleika Patterson APRN CNP   order for DME Bilateral heel protective cushioning boots.  Dx: previous stroke with left hemiplegia.  Duration of need: 99 months.   Thomas Dill MD   order for DME Equipment being ordered: Cushioned heel boots.   Thomas Dill MD   order for DME Equipment being ordered: Wheelchair, manual, with elevated leg rests and tilt in space back.  Please fax to [facility]; I called them 11/26/16 and they requested the new order.  Face to face is in my 10/26/16 note.   Thomas Dill MD   order for DME Equipment being ordered: Wheelchair, manual.   Thomas Dill MD   order for DME Equipment being ordered: Hospital Bed, fully electric.   Thomas Dill MD   order for DME Equipment being ordered: Reclining manual w/c with bilateral elevating leg rests.   Thomas Dill MD   order for DME Equipment being ordered: Bilateral leg supports for manual wheelchair.   Thomas Dill MD   ORDER FOR DME Equipment being ordered: Lift  chair.    Please see indications and face-to-face encounter details in 2/3/15 Office Visit note.   Thomas Dill MD   protein modular (PROSOURCE TF) 1 packet by Per Feeding Tube route 3 times daily   Jesusita Kaiser MD         Medication history completed by: Katy Dickson, PharmD Resident

## 2018-07-09 NOTE — PLAN OF CARE
Problem: Patient Care Overview  Goal: Plan of Care/Patient Progress Review  VSS on RA. Lvad numbers without alarms (PI 2.5-3s), Per daughter, patient much more confused today, verbalizing that he is on a boat. Frequent, productive cough.. Distended and discomfort in abdomen. Abdominal xray performed. Voiding in urinal with urinary retention, bladder scanning and suppository given. Frequent turning d/t discomfort. Will continue POC. Observation status.

## 2018-07-09 NOTE — PLAN OF CARE
Problem: Patient Care Overview  Goal: Plan of Care/Patient Progress Review  Outcome: No Change  Observation goals:  1. diagnostic tests and consults completed and resulted  2. vital signs normal or at patient baseline      Awaiting ID Consult to be completed today and morning labs.  VSS and at patient baseline.

## 2018-07-09 NOTE — PLAN OF CARE
Problem: Patient Care Overview  Goal: Plan of Care/Patient Progress Review  Outcome: No Change  Observation goals:  1. diagnostic tests and consults completed and resulted  2. vital signs normal or at patient baseline        Awaiting ID Consult to be completed today.  Lab results pending.  VSS and at patient baseline.

## 2018-07-09 NOTE — PLAN OF CARE
Problem: Patient Care Overview  Goal: Plan of Care/Patient Progress Review  Outcome: No Change  Observation goals:  1. diagnostic tests and consults completed and resulted  2. vital signs normal or at patient baseline    1. Not complete  2. LVAD numbers WDL and vital signs at baseline

## 2018-07-09 NOTE — ED NOTES
Pt BPs dropped slightly after fluid bolus completed. Dr. Flroes aware and OK to trend pressures for now without treating BPs at this time due to pulmonary edema.

## 2018-07-09 NOTE — CONSULTS
[facility] ID SERVICE: NEW CONSULTATION  Patient:  Sohan Rendon, Date of birth 1950, Medical record number [id number]  Date of Admission: 7/8/2018  Date of Visit:  7/9/2018  Requesting Provider: Carrie Butler         Assessment and Recommendations:   Problem List:    1. Urine culture with VRE  2. ICM s/p HM II LVAS   3. CKD  5. Multiple ischemic CVAs (most recently MCA stroke with aphasia)  6. Concern for delirium (see discussion)    Recommendations:   - Continue to follow cultures, CBC. Blood cultures x2 drawn yesterday with NGTD.  - Continue Linezolid 600mg BID PO to complete a 14 day course (started 7/8, end 7/22).     Discussion:  Sohan Rendon is a 68 year old male who presented to the ED yesterday with apparent malaise. History is provided by the patient's daughter as the patient is essentially nonverbal and non ambulatory after previous stroke.  Patient has an LVAD for history of congestive heart failure.  Patient's daughter reports that he simply did not look well to her and he seemed like he was uncomfortable. The patient previously presented to the ED on 7/5/18 and was subsequently diagnosed with a UTI. He was prescribed Keflex.  The urine culture resulted >100,000 colonies of VRE sensitive to the Linezolid. It is unclear whether this represents VRE colonization or infection in this clinical context. Patient's daughter reports that he had some purulent discharge from the penis yesterday; will follow up on results of PCR for N gonorrhea (urethral swab) and Chlamydia (urine). The differential for this discharge includes prostatitis, pyelonephritis, and lower UTI. No penile discharge was noted today. CXR shows some pulmonary congestion and small bilateral effusions which are stable. His daughter does note some coughing with clear sputum. The patient remains afebrile, and WBC count and lactate within normal ranges. This afternoon, patient was less orientated to situation and place, had difficulty  with attention, and appeared more confused, raising concern for delirium. The etiology of this is uncertain, and could be related to acute infection, medication, and/or underlying cardiac or neurological pathology. Head CT was offered but deferred by the patient's daughter.The recommendation at this time is to continue Linezolid 600 mg PO and continue to observe at this time. Recommend supportive care to manage symptoms of delirium.     Recommendations discussed with the patient's primary team.    Thank you for this consult. The [facility] ID team will continue to follow this patient. Please feel free to call with any questions.     Jordyn Sarah MD MPH  PGY-1 Internal Medicine  [phone]       History of Present Illness:     Sohan Rendon is a 69 yo M with a history of idiopathic cardiomyopathy s/p HM II LVAS in 2012 as destination therapy with a course complicated by hemolysis, pump thrombosis and multiple strokes (subcortical, R PICA, R MCA), PFO s/p closure in 2012, status post mitral valve replacement with mechanical valve,  ICD placement in 2011, latent TB, PFO s/p closure in 2012 and CKD, chronic R femoral fracture who is admitted with weakness and malaise and concern for UTI after a UCx grew VRE. Mr. Rendon is from [facility] and speaks Swahili and understands limited English.      Mr Rendon was recently admitted from 6/6-6/22 with a new MCA stroke c/b aphasia for which he had to be started on tube feeds on discharge he was somewhat more alert and the plan was to reassess to discontinue TF once he was able to eat better. His hospitalization was c/b a UTI with VRE for which he received a 5d course of linezolid per ID recommendations.      He presented to the hospital on 7/5/18 with chest pain and increased cough production and decreased UOP. Labs were significant for a SCr of 1.7 . His UA was positive and after questioning family mentioned dysuria. They mindy a urine culture and prescribed him keflex. He had a CT of  his abdomen that showed non obstructive gas pattern stable support devices and L>R bibasilar pulmonary opacities.  The UCx from that day is positive for VRE sensitive to linezolid so they called the patient's family and told him to come to the ED. Keflex was stopped and switched to linezolid.  Patient's daughter reports that they stopped his tube feeding yesterday.      On presentation he HR was tachy in the 110's MAP's  In the high 60's -70's. His labs are significant for a WBC of 9.2 (was 10 prior to d/c 6/28) BUN 39 and SCr 2.13 (up from 42/1.77 two days ago sodium 134 albumin 2.6, LDH is 1192 in the setting of a known LVAD thrombus and procalcitonin is negative. CXR shows some pulmonary congestion and small b/l effusions.       His daughter does note some coughing with whitish sputum and orthopnea with occasional wheezing no fevers.         Review of Systems:   12-point review of systems was conducted (with aid of interpretation of family visitors) and was negative other than described in HPI. Of note, review was limited by patient's expressive aphasia.        Past Medical History:     Past Medical History:   Diagnosis Date     Acute right MCA stroke (H) 6/2013    With L sided hemiparesis      Anemia of chronic disease     Baseline Hb 8-9     BPH (benign prostatic hyperplasia)      CHF (congestive heart failure), NYHA class IV (H) 10/9/2012    s/p HeartMate II.  Was on milrinone and dobutamine prior to LVAD      CKD (chronic kidney disease)     Baseline Cr=     Clostridium difficile colitis 12/2012      Dysphagia     PEG tube placed in 2012.  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  Normal carotid duplex 3/2014.       Esophagitis 12/2012    EGD with esophagitis and multiple douenal ulcers     Fracture of femoral neck, right, closed (H) 2/3/2015    Presumed pathologic as patient is non-weight-bearing and suffered  no trauma.  Family declined operative repair during hospital stay 1/23 - 1/30/15.     Gastric and duodenal angiodysplasia with hemorrhage 6/18/2015     GERD (gastroesophageal reflux disease)      Gout      Hematuria      HTN (hypertension)     LDL=59 (3/29/2014)     Hyperlipaemia      Ischemic cardiomyopathy      Mitral regurgitation     s/p MVR with Carbomedics ring      Myocardial infarction 1998    In [facility] without intervention      Nonsenile cataract     BE     PFO (patent foramen ovale)     s/p closure (10/9/2012)     TB lung, latent     s/p 9 months INH in 2012          Allergies:      Allergies   Allergen Reactions     Seasonal Allergies            Recent Antimicrobials::     - Linezolid (ZYVOX) tablet 600 mg       Family History:     Family History   Problem Relation Age of Onset     Family History Negative No family hx of      Glaucoma No family hx of      Macular Degeneration No family hx of      Cancer No family hx of      no skin cancer          Social History:     Social History     Social History     Marital status:      Spouse name: N/A     Number of children: N/A     Years of education: N/A     Occupational History     Not on file.     Social History Main Topics     Smoking status: Former Smoker     Smokeless tobacco: Former User     Alcohol use No     Drug use: No     Sexual activity: Not on file     Other Topics Concern     Not on file     Social History Narrative              Physical Exam:   BP (!) 89/60 (BP Location: Left arm)  Temp 97.4  F (36.3  C) (Oral)  Resp 18  Wt 87.5 kg (192 lb 14.4 oz)  SpO2 100%  BMI 29.33 kg/m2     Exam:  GENERAL:  Well-developed, well-nourished, sitting in bed in no acute distress. Minimally verbal.   HEENT:  Head is normocephalic, atraumatic. Oropharynx is moist without exudates or ulcers.  LUNGS:  Cough productive of clear-white sputum. Bilateral crackles and wheezes at lung bases.  CARDIOVASCULAR:  Mildly distended. Guarding difficult to evaluate.    ABDOMEN:  Normal bowel sounds, soft, nontender.  : No penile discharge noted. Mild diffuse red rash on groin and pelvis. Rectal laceration (~0.8cm) with no surrounding erythema or signs of active infection.   EXT: Extremities warm and without edema.  SKIN:  No acute rashes. LVAD Line is in place without any surrounding erythema.  NEUROLOGIC:  Expressive aphasia. Left-sided hemiplegia.          Laboratory Data:     Creatinine   Date Value Ref Range Status   07/09/2018 2.13 (H) 0.66 - 1.25 mg/dL Final   07/08/2018 2.13 (H) 0.66 - 1.25 mg/dL Final   07/05/2018 1.77 (H) 0.66 - 1.25 mg/dL Final   06/28/2018 1.50 (H) 0.66 - 1.25 mg/dL Final   06/22/2018 1.80 (H) 0.66 - 1.25 mg/dL Final     WBC   Date Value Ref Range Status   07/09/2018 8.8 4.0 - 11.0 10e9/L Final   07/08/2018 9.2 4.0 - 11.0 10e9/L Final   07/05/2018 10.0 4.0 - 11.0 10e9/L Final   06/28/2018 10.5 4.0 - 11.0 10e9/L Final   06/22/2018 24.1 (H) 4.0 - 11.0 10e9/L Final     Hemoglobin   Date Value Ref Range Status   07/09/2018 7.4 (L) 13.3 - 17.7 g/dL Final     Platelet Count   Date Value Ref Range Status   07/09/2018 222 150 - 450 10e9/L Final     Lab Results   Component Value Date     07/09/2018    BUN 37 (H) 07/09/2018    CO2 16 (L) 07/09/2018           Pertinent Recent Microbiology Data:     Recent Labs  Lab 07/08/18  1532 07/08/18  1416 07/05/18  2315   CULT No growth after 14 hours No growth after 14 hours >100,000 colonies/mLEnterococcus faecium (VRE)*  Previous critical documented   SDES Blood Left Hand Blood Right Arm Midstream Urine     Culture & Susceptibility      ENTEROCOCCUS FAECIUM (VRE)      Antibiotic Interpretation Sensitivity Unit Method Status     AMPICILLIN Resistant >=32 ug/mL WILFRID Final     LINEZOLID Sensitive 2 ug/mL WILFRID Final     NITROFURANTOIN Intermediate 64 ug/mL WILFRID Final     PENICILLIN Resistant 32 ug/mL WILFRID Final     VANCOMYCIN Resistant >=32 ug/mL WILFRID Final     Comment: VRE- Requires Contact Precautions                Imaging:     Recent Results (from the past 744 hour(s))   XR Chest Port 1 View    Narrative    EXAM: XR CHEST PORT 1 VW  6/14/2018 10:55 AM     HISTORY:  leukocytosis r/o pna;        COMPARISON: Chest radiograph 6/6/2018    FINDINGS: Single portable AP view of chest. Unchanged cardio  defibrillator device, LVAD, gastric tube. Partially visualized biliary  drain. Stable enlarged cardiac silhouette. No pneumothorax. Left  retrocardiac opacity. Mild pulmonary congestion.      Impression    IMPRESSION:   1. Left retrocardiac opacity, likely infection or atelectasis.  2. Stable large cardiac silhouette and mild pulmonary congestion.    I have personally reviewed the examination and initial interpretation  and I agree with the findings.    KINSEY HATCH MD   XR Chest Port 1 View    Narrative    Exam:  XR CHEST PORT 1 VW, 6/17/2018 10:13 PM    History: hemoptysis;     Comparison:  6/14/2018.    Findings:  Single AP view of the chest. Left chest wall cardiac device  lead projects over the right ventricle. Enteric tube projects off the  field-of-view below the diaphragm. LVAD projects at the cardiac apex.  Median sternotomy wires and mediastinal surgical clips. Stable  cardiomegaly. Low lung volumes. No pleural effusion or pneumothorax.  Chronic left retrocardiac atelectasis. No new airspace opacities.  Visualized upper abdomen and bones are unremarkable.      Impression    Impression:    1. No acute airspace disease.  2. Stable cardiomegaly and left basilar atelectasis.  3. Stable support devices.    I have personally reviewed the examination and initial interpretation  and I agree with the findings.    JENNIFER KUNZ MD   XR Chest 2 Views    Narrative    Exam:  XR CHEST 2 VW, 6/28/2018 9:40 PM    History: cough;     Comparison:  Chest radiograph 6/17/2018.    Findings:  AP and lateral views of the chest. Left chest cardiac  device leads project over the right atrium and right ventricle.  Postoperative changes in the  chest including median sternotomy wires  and mediastinal surgical clips. Prosthetic mitral valve is noted. LVAD  projects at the cardiac apex. Enteric tube projects off the  field-of-view below the diaphragm. Low lung volumes. Increased  bilateral pleural effusions and bibasilar opacities. Visualized upper  abdomen and bones are unremarkable.      Impression    Impression:   Low lung volumes with increased bilateral pleural effusions and  basilar atelectasis and/or consolidation.    I have personally reviewed the examination and initial interpretation  and I agree with the findings.    ISRAEL PEOPLES MD   XR Abdomen Port 1 View    Narrative    Exam: XR ABDOMEN PORT 1 VW, 6/28/2018 10:09 PM    Indication: check feeding tube position;     Comparison: 6/7/2018    Findings:   Feeding tube tip projects over the third portion of the duodenum.  Biliary stent in place. Heart is enlarged and there is an LVAD and  AICD in place. No dilated loop of bowel.      Impression    Impression: Feeding tube tip projects over the third portion the  duodenum.    I have personally reviewed the examination and initial interpretation  and I agree with the findings.    ISRAEL PEOPLES MD   XR Chest Port 1 View    Narrative    Exam: XR CHEST PORT 1 VW, 7/5/2018 8:20 PM    Indication: cough, chest pain;     Comparison: 6/28/2018, 6/17/2018, 6/14/2018    Findings:   AP views of the chest were obtained. Left chest pacemaker/implantable  cardiac defibrillator in stable position. Partially imaged left  ventricular assist device. Sternotomy wires and mediastinal surgical  clips; CABG. The feeding tube passes below the field-of-view. The  cardiomediastinal silhouette is unchanged. Decreased lung volumes. No  pneumothorax. Small pleural effusions. Unchanged left basilar  opacities. Partially imaged biliary drain in the upper abdomen.      Impression    Impression:   Stable small pleural effusions and left basilar  atelectasis/consolidation.    I  have personally reviewed the examination and initial interpretation  and I agree with the findings.    HUNTER SEPULVEDA MD   XR Abdomen 2 Views    Narrative    Exam: XR ABDOMEN 2 VW, 7/5/2018 10:04 PM    Indication: Abdominal pain;     Comparison: Radiograph on 6/28/2018 radiograph on 6/7/2018    Findings:   Upright and supine views of abdomen.  The tip of feeding tube projects over the third portion of duodenum.  Stable biliary stent. LVAD is stable in position. Partial  visualization of cardiac leads.  Nonobstructive bowel gas pattern. No pneumatosis. Chronic right  femoral neck nonunion fracture with stable superior displacement of  the right femur. Left greater than on right, bibasilar opacities.      Impression    Impression:   1. Nonobstructive bowel gas pattern.  2. Support devices are stable.  3. Chronic right femoral neck nonunion fracture with stable superior  displacement of the right femur.  4. Left greater than on right bibasilar pulmonary opacity.    I have personally reviewed the examination and initial interpretation  and I agree with the findings.    JESSICA DE LA CRUZ MD   XR Chest 1 View    Narrative    XR CHEST 1 VW  7/8/2018 4:22 PM      HISTORY: Shortness of breath;     COMPARISON: 7/5/2018    FINDINGS: Left ventricular assist device. Sternotomy wires.  Mediastinal surgical clips. Left chest implanted cardiac defibrillator  lead projects over the right ventricle. Enlarged cardiac silhouette.  Increased pulmonary vascular congestion. No pneumothorax. Bilateral  pleural effusions. No acute osseous abnormalities.      Impression    IMPRESSION:   1. Pulmonary vascular congestion/pulmonary edema has increased from  comparison radiographs 7/5/2018.  2. Small bilateral pleural effusions.  3. Enlarged cardiac silhouette.    I have personally reviewed the examination and initial interpretation  and I agree with the findings.    IGNACIO SILVA MD

## 2018-07-09 NOTE — PROGRESS NOTES
Care Coordinator - Discharge Planning    Admission Date/Time:  7/8/2018  Attending MD:  Carrie Butler MD   Data  Chart reviewed, discussed with interdisciplinary team.   Patient was admitted for: Observation Status  1. Urinary tract infection without hematuria, site unspecified    2. Acute kidney injury (H)    3. LVAD (left ventricular assist device) present (H)    4. SOB (shortness of breath)    5. Shortness of breath    Assessment   Concerns with insurance coverage for discharge needs: None.  Current Living Situation: Patient lives with family.  Support System: Supportive and Involved family.   Services Involved: [facility] monitoring, [facility], PCA services.   Transportation at Discharge: [employer] stretcher ride.   Transportation to Medical Appointments: same.   Barriers to Discharge: per NP, pt is not yet medically ready for discharge.       Coordination of Care and Referrals: Provided patient/family with options for resumption of home care.   Pt's daughter said that pt is already set up for home care with [facility] and they want their services resumed. Pt's daughter said that pt will need a stretcher ride home arranged with [employer] [facility] upon discharge from hospital.   Intervention:      Arrangements made with [facility] (Ph: [phone] Fax: [phone]) for resumption of Palliative Care Program, RN eval post hospitalization, assess vital signs, respiratory and cardiac status, activity tolerance, hydration, nutritional status, med set up and management. INR lab draws; with results to the [facility].     Plan  Anticipated Discharge Date:   TBD  Anticipated Discharge Plan:  Discharge to home with resumption of home care.     JAYLEN KLEIN RN BSN  Care Coordinator Unit   [phone]

## 2018-07-09 NOTE — PROGRESS NOTES
[facility]   Cardiology II Service / Advanced Heart Failure  Daily Progress Note  Date of Service: 7/9/2018      Patient: Sohan Rendon  MRN: [id number]  Admission Date: 7/8/2018  Hospital Day # 0    Assessment and Plan: Mr. 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 He presents from ED for VRE UTI.     ICM s/p HM II LVAD. Implanted in 2012 as DT. Complicated by recurrent CVA, hemolysis, and pump thrombus.   Stage D, NYHA Class III  ACEi/ARB Losartan 25 mg po daily.   BB Toprol XL discontinued with last hospitalization.   Aldosterone antagonist Contraindicated given recent CKD.   SCD prophylaxis ICD.   Fluid status: Hypervolemic. Lasix 20 mg IV this AM. PI's 2.7 following Lasix this AM with baseline 4-5. Chest X-ray consistent with pulmonary edema.   MAP: 68-79.  LDH: deferred given history.   Anticoagulation: INR 2.62. Continue Coumadin per pharmacy.   Antiplatelet:  mg po daily.     VRE UTI with concern for Prostatitis. Treated 6/14 for 5 days with Zyvox for VRE in urine. Recurrent pyuria and dysuria per family.   - Appreciate ID consult.   - Continue Zyvox for 14 days.   - Obtain PVR. If > 400 will need to consider straight cath.   - Resumed Flomax and Proscar as prior to admission.     Acute Delirium.   - Offered repeat head CT, but daughter agreed to defer at this time.   - Differential consists of hospital delirium, acute infection, and medication side effects.   - Continue to monitor.      Abdominal Distention. History of constipation and gaseous distention in the past.   - Abd X-ray pending.   - Simethicone resumed.     Chronic Medical Conditions:  HTN. Losartan 25 mg po daily   CAD and Hyperlipidemia. BB deferred in RV failure.   CKD, Stage III. Cr stable at 2.13.  Chronic LDH elevation. Likely secondary to Pump thrombus. Coumadin and ASA as above.      FEN: Cardiac diet   PROPHY: Coumadin as above.   LINES: PIV  DISPO: Anticipate discharge to home in AM.   CODE STATUS: DNR/DNI  ================================================================    Interval History/ROS: History per daughter. She notes mild confusion this AM. She notes abdominal distention and recurrent clear cough. She notes mild output after Lasix this AM. She ensures me that he is not coughing with eating and he is tolerating oral intake. She notes last BM yesterday.     Last 24 hr care team notes reviewed.   ROS:  4 point ROS including Respiratory, CV, GI and , other than that noted in the HPI, is negative.     Medications: Reviewed in EPIC.     Physical Exam:   BP (!) 75/61 (BP Location: Right arm)  Temp 97.3  F (36.3  C) (Axillary)  Resp 18  Wt 87.5 kg (192 lb 14.4 oz)  SpO2 97%  BMI 29.33 kg/m2  GENERAL: Appears alert and oriented times three.   HEENT: Eye symmetrical and free of discharge bilaterally. Mucous membranes moist and without lesions.  NECK: Supple and without lymphadenopathy. JVD mid neck upright.   CV: S1S2 present with LVAD hum.   RESPIRATORY: Respirations regular, even, and unlabored. Lungs CTA throughout.   GI: Soft and distended with normoactive bowel sounds present in all quadrants. No tenderness, rebound, guarding. No organomegaly.   EXTREMITIES: Trace bilateral peripheral edema. 2+ bilateral pedal pulses.   NEUROLOGIC: Alert and orientated x 3. CN II-XII grossly intact. No focal deficits.   MUSCULOSKELETAL: No joint swelling or tenderness.   SKIN: No jaundice. No rashes or lesions.      Data:  CMP  Recent Labs  Lab 07/09/18  0628 07/08/18  1532 07/05/18 2036    134 139   POTASSIUM 4.1 3.9 4.3   CHLORIDE 108 107 112*   CO2 16* 17* 22   ANIONGAP 13 11 6   * 163* 135*   BUN 37* 39* 42*   CR 2.13* 2.13* 1.77*   GFRESTIMATED 31* 31* 39*   GFRESTBLACK 38* 38* 47*   JOSÉ 8.2* 7.7* 8.1*   PROTTOTAL  --  6.9  --    ALBUMIN  --  2.6*  --    BILITOTAL  --  0.6  --    ALKPHOS  --  88  --    AST  --  58*  --    ALT  --  27  --      CBC  Recent Labs  Lab 07/09/18 0628 07/08/18  1532 07/05/18 2036   WBC 8.8 9.2 10.0   RBC 2.69* 2.74* 2.80*   HGB 7.4* 7.5* 7.8*   HCT 25.1* 25.3* 27.0*   MCV 93 92 96   MCH 27.5 27.4 27.9   MCHC 29.5* 29.6* 28.9*   RDW 22.3* 22.6* 22.3*    227 209     INR  Recent Labs  Lab 07/09/18  0902 07/08/18  1642 07/05/18 2036 07/05/18   INR 2.62* 2.08* 1.45* 2.00       Patient discussed with Dr. Aviles.      Zuleika Patterson FN  7/9/2018

## 2018-07-10 NOTE — PLAN OF CARE
Problem: Patient Care Overview  Goal: Plan of Care/Patient Progress Review  Outcome: No Change  Pt admitted 7/8 with VRE UTI.  HM II LVAD with PI's noted as low as 2.9, other numbers WNL and no alarms noted.  Pace or ST, VS'S on RA and denied pain.  Still awaiting pt to void (samples pending; gonorrheae sample kit here and awaiting MD to do).  Latest bladder scan was 375 ml around 200 am.  Will scan again around 600 and contact team prn.  New linear skin tear on coccyx, cleansed and Mepilex applied.  WOC consult ordered.  Frequent yellow sputum and new prn Tessalon ordered.  Fecal incontinent and cleansed.  Turn q2h.  Otherwise, pt slept well and up with lift.  Family at bedside.  Continue to monitor and with POC.    Ob's goals:    2000 0000    400    VS'S     Met    Met    Met  Diagnostic test completed  Pending   Pending   Pending

## 2018-07-10 NOTE — PLAN OF CARE
Problem: Patient Care Overview  Goal: Plan of Care/Patient Progress Review  Discharge Planner SLP   Patient plan for discharge: home with family  Current status: Bedside swallowing evaluation completed per MD order. The patient is known to the SLP service, with a hx of dysphagia (baseline diet level being dysphagia diet 2 and thin liquids as of June 2018) and also a hx of refusal to eat, requiring a PEG as primary nutrition source. During this exam the patient's daughter fed him bites of puree, whole pills 1 at a time, and sips of thin H20 by straw. He demonstrated slow oral transit and a delayed initiation of his pharyngeal swallow, but had no overt signs/symptoms of aspiration directly after the swallow. He was noted to have a cough minutes after swallowing, staff confirms noticing a delayed cough after PO. SLP provided education about modified diet options (recommended dysphagia diet 2 and thin liquids), a possible videoswallow study to rule out aspiration if pending chest xray is concerning for aspiration, and safe swallowing precautions. The patient's daughter feels his swallow is intact and wants him to continue a regular texture diet and thin liquids. After this session, SLP spoke to NP about eval results and the patient's daughters diet level wishes. NP reports chest xray is not worsened, and SLP should sign off for now.   Barriers to return to prior living situation: variable swallowing function, reduced oral intake  Recommendations for discharge: home with family  Rationale for recommendations: signing off as swallowing function is likely at baseline, family wishes for a regular diet       Entered by: Candi Sifuentes 07/10/2018 2:24 PM

## 2018-07-10 NOTE — PROGRESS NOTES
07/10/18 1100   General Information   Onset Date 07/08/18   Start of Care Date 07/10/18   Referring Physician Zuleika Patterson, APRN CNP   Patient Profile Review/OT: Additional Occupational Profile Info See Profile for full history and prior level of function   Patient/Family Goals Statement patient's daughter wishes for a regular diet   Swallowing Evaluation Bedside swallow evaluation   Behaviorial Observations Confused;Uncooperative   Mode of current nutrition Oral diet;PEG   Type of oral diet Regular;Thin liquid   Respiratory Status Room air   Comments Mr. 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 He presents from ED for VRE UTI.  The patient is known to the SLP service, most recently on dysphagia diet 2 and thin liquids in June 2018.   Clinical Swallow Evaluation   Oral Musculature unable to assess due to poor participation/comprehension   Dentition present and adequate   Clinical Swallow Eval: Thin Liquid Texture Trial   Mode of Presentation, Thin Liquids straw;fed by clinician;other (see comments)  (fed by daughter)   Volume of Liquid or Food Presented 5 oz thin h20, x4 pills served 1 at a time with H20   Oral Phase of Swallow Poor AP movement   Pharyngeal Phase of Swallow reduction in laryngeal movement  (delayed laryngeal elevation)   Diagnostic Statement no direct signs/symptoms of aspiration. cough present minutes after PO.   Clinical Swallow Eval: Puree Solid Texture Trial   Mode of Presentation, Puree  spoon;other (see comments)  (fed by daughter)   Volume of Puree Presented 2 oz puree applesauce   Oral Phase, Puree Poor AP movement   Oral Residue, Puree (none)   Pharyngeal Phase, Puree reduction in laryngeal movement  (delayed laryngeal elevation)   Diagnostic Statement no direct signs/symptoms of aspiration. cough present minutes after PO.   Swallow Compensations   Swallow Compensations Alternate viscosity of consistencies;Pacing;Reduce amounts   Esophageal Phase of Swallow   Patient reports or presents with symptoms of esophageal dysphagia No   Swallow Eval: Clinical Impressions   Skilled Criteria for Therapy Intervention Current level of function same as previous level of function   Functional Assessment Scale (FAS) 4   Treatment Diagnosis mild to moderate oropharyngeal dypshagia   Diet texture recommendations Dysphagia diet level 2;Thin liquids  (daughter requests a regular diet & thin liquids)   Recommended Feeding/Eating Techniques alternate between small bites and sips of food/liquid;maintain upright posture during/after eating for 30 mins;small sips/bites   Therapy Frequency (evaluation only per discussion with PA)   Anticipated Discharge Disposition home w/ assist   Risks and Benefits of Treatment have been explained. Yes   Patient, family and/or staff in agreement with Plan of Care Yes   Clinical Impression Comments Bedside swallowing evaluation completed per MD order. The patient is known to the SLP service, with a hx of dysphagia (baseline diet level being dysphagia diet 2 and thin liquids as of June 2018) and also a hx of refusal to eat, requiring a PEG as primary nutrition source. During this exam the patient's daughter fed him bites of puree, whole pills 1 at a time, and sips of thin H20 by straw. He demonstrated slow oral transit and a delayed initiation of his pharyngeal swallow, but had no overt signs/symptoms of aspiration directly after the swallow. He was noted to have a cough minutes after  swallowing, staff confirms noticing a delayed cough after PO. SLP provided education about modified diet options (recommended dysphagia diet 2 and thin liquids), a possible videoswallow study to rule out aspiration if pending chest xray is concerning for aspiration, and safe swallowing precautions. The patient's daughter feels his swallow is intact and wants him to continue a regular texture diet and thin liquids. After this session, SLP spoke to NP about eval results and the patient's daughters diet level wishes. NP reports chest xray is not worsened, and SLP should sign off for now.    Total Evaluation Time   Total Evaluation Time (Minutes) 21

## 2018-07-10 NOTE — PROGRESS NOTES
[facility] ID SERVICE: ONGOING CONSULTATION  Patient:  Sohan Rendon, Date of birth 1950, Medical record number [id number]  Date of Admission: 7/8/2018  Date of Visit:  7/10/2018         Assessment and Recommendations:   Problem List:  1. Urine culture with VRE (complicated UTI vs prostatitis vs bacteriuria)  2. Concern for delirium     Recommendations:   - Continue Linezolid 600mg BID PO to complete a 14 day course (started 7/8, end 7/22).     Discussion:  Given the recurrent VRE bacteriuria in this patient whose family is reporting dysuria and fowl smelling urine with abnormal UA, I would treat for possible prostatitis and complicated UTI with a 2 week course of linezolid.   The encephalopathy is likely to be due to delirium.     ID will sign off, please call for questions.       Attestation:  Pippa Mendoza MD   Pager: [phone]  7/10/2018            Interim History:   The patient was confused earlier today and was given sedative so currently he's sleeping.   No new major events.        History of Present Illness:     Sohan Rendon is a 69 yo M with a history of idiopathic cardiomyopathy s/p HM II LVAS in 2012 as destination therapy with a course complicated by hemolysis, pump thrombosis and multiple strokes (subcortical, R PICA, R MCA), PFO s/p closure in 2012, status post mitral valve replacement with mechanical valve,  ICD placement in 2011, latent TB, PFO s/p closure in 2012 and CKD, chronic R femoral fracture who is admitted with weakness and malaise and concern for UTI after a UCx grew VRE. Mr. Rendon is from [facility] and speaks [place]ahili and understands limited English.      Mr Rendon was recently admitted from 6/6-6/22 with a new MCA stroke c/b aphasia for which he had to be started on tube feeds on discharge he was somewhat more alert and the plan was to reassess to discontinue TF once he was able to eat better. His hospitalization was c/b a UTI with VRE for which he received a 5d course of linezolid  per ID recommendations.      He presented to the hospital on 7/5/18 with chest pain and increased cough production and decreased UOP. Labs were significant for a SCr of 1.7 . His UA was positive and after questioning family mentioned dysuria. They mindy a urine culture and prescribed him keflex. He had a CT of his abdomen that showed non obstructive gas pattern stable support devices and L>R bibasilar pulmonary opacities.  The UCx from that day is positive for VRE sensitive to linezolid so they called the patient's family and told him to come to the ED. Keflex was stopped and switched to linezolid.  Patient's daughter reports that they stopped his tube feeding yesterday.      On presentation he HR was tachy in the 110's MAP's  In the high 60's -70's. His labs are significant for a WBC of 9.2 (was 10 prior to d/c 6/28) BUN 39 and SCr 2.13 (up from 42/1.77 two days ago sodium 134 albumin 2.6, LDH is 1192 in the setting of a known LVAD thrombus and procalcitonin is negative. CXR shows some pulmonary congestion and small b/l effusions.       His daughter does note some coughing with whitish sputum and orthopnea with occasional wheezing no fevers.         Review of Systems:   The patient is sleeping and sedated so it could not be obtained.           Recent Antimicrobials::     - Linezolid (ZYVOX) tablet 600 mg           Physical Exam:   BP (!) 79/42 (BP Location: Left arm)  Pulse 107  Temp 97.4  F (36.3  C) (Axillary)  Resp 20  Wt 83.8 kg (184 lb 11.9 oz)  SpO2 99%  BMI 28.09 kg/m2     Exam:  GENERAL:  Well-developed, well-nourished, sitting in bed in no acute distress. Sedated.    CARDIOVASCULAR:  With LVAD background noise.   ABDOMEN:  Normal bowel sounds, soft, nontender.  : No penile discharge noted. Mild diffuse red rash on groin and pelvis. Rectal laceration (~0.8cm) with no surrounding erythema or signs of active infection.   EXT: Extremities warm and with +2-3 edema.           Laboratory Data:      Creatinine   Date Value Ref Range Status   07/10/2018 2.31 (H) 0.66 - 1.25 mg/dL Final   07/09/2018 2.13 (H) 0.66 - 1.25 mg/dL Final   07/08/2018 2.13 (H) 0.66 - 1.25 mg/dL Final   07/05/2018 1.77 (H) 0.66 - 1.25 mg/dL Final   06/28/2018 1.50 (H) 0.66 - 1.25 mg/dL Final     WBC   Date Value Ref Range Status   07/10/2018 7.8 4.0 - 11.0 10e9/L Final   07/09/2018 8.8 4.0 - 11.0 10e9/L Final   07/08/2018 9.2 4.0 - 11.0 10e9/L Final   07/05/2018 10.0 4.0 - 11.0 10e9/L Final   06/28/2018 10.5 4.0 - 11.0 10e9/L Final     Hemoglobin   Date Value Ref Range Status   07/10/2018 8.0 (L) 13.3 - 17.7 g/dL Final     Platelet Count   Date Value Ref Range Status   07/10/2018 227 150 - 450 10e9/L Final     Lab Results   Component Value Date     07/10/2018    BUN 37 (H) 07/10/2018    CO2 16 (L) 07/10/2018           Pertinent Recent Microbiology Data:       Recent Labs  Lab 07/08/18  1532 07/08/18  1416 07/05/18  2315   CULT No growth after 2 days No growth after 2 days >100,000 colonies/mLEnterococcus faecium (VRE)*  Previous critical documented   SDES Blood Left Hand Blood Right Arm Midstream Urine     Culture & Susceptibility      ENTEROCOCCUS FAECIUM (VRE)      Antibiotic Interpretation Sensitivity Unit Method Status     AMPICILLIN Resistant >=32 ug/mL WILFRID Final     LINEZOLID Sensitive 2 ug/mL WILFRDI Final     NITROFURANTOIN Intermediate 64 ug/mL WILFRID Final     PENICILLIN Resistant 32 ug/mL WILFRID Final     VANCOMYCIN Resistant >=32 ug/mL WILFRID Final     Comment: VRE- Requires Contact Precautions               Imaging:     Recent Results (from the past 744 hour(s))   XR Chest Port 1 View    Narrative    EXAM: XR CHEST PORT 1 VW  6/14/2018 10:55 AM     HISTORY:  leukocytosis r/o pna;        COMPARISON: Chest radiograph 6/6/2018    FINDINGS: Single portable AP view of chest. Unchanged cardio  defibrillator device, LVAD, gastric tube. Partially visualized biliary  drain. Stable enlarged cardiac silhouette. No pneumothorax.  Left  retrocardiac opacity. Mild pulmonary congestion.      Impression    IMPRESSION:   1. Left retrocardiac opacity, likely infection or atelectasis.  2. Stable large cardiac silhouette and mild pulmonary congestion.    I have personally reviewed the examination and initial interpretation  and I agree with the findings.    KINSEY HATCH MD   XR Chest Port 1 View    Narrative    Exam:  XR CHEST PORT 1 VW, 6/17/2018 10:13 PM    History: hemoptysis;     Comparison:  6/14/2018.    Findings:  Single AP view of the chest. Left chest wall cardiac device  lead projects over the right ventricle. Enteric tube projects off the  field-of-view below the diaphragm. LVAD projects at the cardiac apex.  Median sternotomy wires and mediastinal surgical clips. Stable  cardiomegaly. Low lung volumes. No pleural effusion or pneumothorax.  Chronic left retrocardiac atelectasis. No new airspace opacities.  Visualized upper abdomen and bones are unremarkable.      Impression    Impression:    1. No acute airspace disease.  2. Stable cardiomegaly and left basilar atelectasis.  3. Stable support devices.    I have personally reviewed the examination and initial interpretation  and I agree with the findings.    JENNIFER KUNZ MD   XR Chest 2 Views    Narrative    Exam:  XR CHEST 2 VW, 6/28/2018 9:40 PM    History: cough;     Comparison:  Chest radiograph 6/17/2018.    Findings:  AP and lateral views of the chest. Left chest cardiac  device leads project over the right atrium and right ventricle.  Postoperative changes in the chest including median sternotomy wires  and mediastinal surgical clips. Prosthetic mitral valve is noted. LVAD  projects at the cardiac apex. Enteric tube projects off the  field-of-view below the diaphragm. Low lung volumes. Increased  bilateral pleural effusions and bibasilar opacities. Visualized upper  abdomen and bones are unremarkable.      Impression    Impression:   Low lung volumes with increased  bilateral pleural effusions and  basilar atelectasis and/or consolidation.    I have personally reviewed the examination and initial interpretation  and I agree with the findings.    ISRAEL PEOPLES MD   XR Abdomen Port 1 View    Narrative    Exam: XR ABDOMEN PORT 1 VW, 6/28/2018 10:09 PM    Indication: check feeding tube position;     Comparison: 6/7/2018    Findings:   Feeding tube tip projects over the third portion of the duodenum.  Biliary stent in place. Heart is enlarged and there is an LVAD and  AICD in place. No dilated loop of bowel.      Impression    Impression: Feeding tube tip projects over the third portion the  duodenum.    I have personally reviewed the examination and initial interpretation  and I agree with the findings.    ISRAEL PEOPLES MD   XR Chest Port 1 View    Narrative    Exam: XR CHEST PORT 1 VW, 7/5/2018 8:20 PM    Indication: cough, chest pain;     Comparison: 6/28/2018, 6/17/2018, 6/14/2018    Findings:   AP views of the chest were obtained. Left chest pacemaker/implantable  cardiac defibrillator in stable position. Partially imaged left  ventricular assist device. Sternotomy wires and mediastinal surgical  clips; CABG. The feeding tube passes below the field-of-view. The  cardiomediastinal silhouette is unchanged. Decreased lung volumes. No  pneumothorax. Small pleural effusions. Unchanged left basilar  opacities. Partially imaged biliary drain in the upper abdomen.      Impression    Impression:   Stable small pleural effusions and left basilar  atelectasis/consolidation.    I have personally reviewed the examination and initial interpretation  and I agree with the findings.    HUNTER SEPULVEDA MD   XR Abdomen 2 Views    Narrative    Exam: XR ABDOMEN 2 VW, 7/5/2018 10:04 PM    Indication: Abdominal pain;     Comparison: Radiograph on 6/28/2018 radiograph on 6/7/2018    Findings:   Upright and supine views of abdomen.  The tip of feeding tube projects over the third portion of  duodenum.  Stable biliary stent. LVAD is stable in position. Partial  visualization of cardiac leads.  Nonobstructive bowel gas pattern. No pneumatosis. Chronic right  femoral neck nonunion fracture with stable superior displacement of  the right femur. Left greater than on right, bibasilar opacities.      Impression    Impression:   1. Nonobstructive bowel gas pattern.  2. Support devices are stable.  3. Chronic right femoral neck nonunion fracture with stable superior  displacement of the right femur.  4. Left greater than on right bibasilar pulmonary opacity.    I have personally reviewed the examination and initial interpretation  and I agree with the findings.    JESSICA DE LA CRUZ MD   XR Chest 1 View    Narrative    XR CHEST 1 VW  7/8/2018 4:22 PM      HISTORY: Shortness of breath;     COMPARISON: 7/5/2018    FINDINGS: Left ventricular assist device. Sternotomy wires.  Mediastinal surgical clips. Left chest implanted cardiac defibrillator  lead projects over the right ventricle. Enlarged cardiac silhouette.  Increased pulmonary vascular congestion. No pneumothorax. Bilateral  pleural effusions. No acute osseous abnormalities.      Impression    IMPRESSION:   1. Pulmonary vascular congestion/pulmonary edema has increased from  comparison radiographs 7/5/2018.  2. Small bilateral pleural effusions.  3. Enlarged cardiac silhouette.    I have personally reviewed the examination and initial interpretation  and I agree with the findings.    IGNACIO SILVA MD   XR Abdomen Port 1 View    Narrative    XR ABDOMEN PORT 1 VW  7/9/2018 3:29 PM      HISTORY: abdominal distention     COMPARISON: 7/5/2018    FINDINGS: Supine views of the abdomen were obtained. Left ventricular  assist device. Common bile duct stent. Mild to moderate colonic stool  burden. Feeding tube has been removed. Chronic fracture deformity of  the right femur. Nonobstructive bowel gas pattern. Lumbosacral  degenerative changes.      Impression     IMPRESSION: Decreased colonic stool burden with respect to radiograph  on 7/5/2018. Nonobstructive bowel gas pattern.    I have personally reviewed the examination and initial interpretation  and I agree with the findings.    STEPHANIE RODRÍGUEZ MD   XR Chest Port 1 View    Narrative    EXAM: XR CHEST PORT 1 VW  7/10/2018 11:20 AM     HISTORY:  cough;        COMPARISON:  View of the chest performed on 7/8/2018.    TECHNIQUE: Upright AP single view of the chest.    FINDINGS: The trachea is midline and patent. Left ventricular assist  device. Sternotomy wires.  Mediastinal surgical clips. Left chest implanted cardiac defibrillator  lead projects over the right ventricle The Cardiomediastinal  silhouette is enlarged and unchanged compared to previous image.  Bilateral reticular opacities suggesting pulmonary congestion. The  osseous thorax and soft tissues are without abnormality. Bilateral  small pleural effusions. There is no radiographic evidence of  pneumothorax.      Impression    IMPRESSION:   1. Pulmonary vascular congestion/pulmonary edema demonstrates  improvement as compared to the study performed on 7/20/2018.  2. Unchanged small bilateral pleural effusions.  3. Unchanged Enlarged cardiac silhouette.    I have personally reviewed the examination and initial interpretation  and I agree with the findings.    KINSEY HATCH MD

## 2018-07-10 NOTE — PROGRESS NOTES
[facility]   Cardiology II Service / Advanced Heart Failure  Device Interrogation Note  Date of Service: 7/10/2018    The patient's HeartMate LVAD was interrogated 7/10/2018  * Speed 8800 rpm   * Pulsatility index 3.1   * Power 5.5 Slaughter   * Flow 4.3 L/minute   Fluid status: Concern for hypovolemia  Alarms were reviewed, and negative for acute events.   The driveline exit site was covered with dressing clean, dry, and intact.   All external components were inspected and showed no evidence of damage or malfunction.    Zuleika Patterson  7/10/2018

## 2018-07-10 NOTE — PLAN OF CARE
Problem: Patient Care Overview  Goal: Plan of Care/Patient Progress Review  VSS on RA. Lvad numbers wnl. -110s. Driveline site without redness, drainage, pain at site. Dressing changed and CDI. Diet advanced to DD2 following swallow evaluation. Cough greatly decreased from yesterday. In AM, patient refusing to take medications (did take morning pills later), per family member patient acting confused and saying the pills were forbidden.  No urine output, bladder scan at noon 198cc. Chest xray performed. Provena boots ordered per WOC recommendation, new orders for left  cleft wound. Switched to impatient. Will continue POC. Cards 2 primary.

## 2018-07-10 NOTE — PROGRESS NOTES
[facility] [place] Care Coordination Contact  CC received notification of Hospital admission.  Hospital admission occurred on 07/08 at [facility] with Dx of UTI  CC at the hospital is aware of client's services in the home.  CC reached out to adult daughter Antionette regarding transition. Offered support as needed.    Reviewed and update care plan as needed.  Notified community service providers and placed services PCA and RN on hold as needed.  Transition log initiated.   PCP notified of hospitalization via EMR.  Nena Salazar RN  [facility], [place]   [phone]

## 2018-07-10 NOTE — PROGRESS NOTES
CLINICAL NUTRITION SERVICES - ASSESSMENT NOTE     Nutrition Prescription    RECOMMENDATIONS FOR MDs/PROVIDERS TO ORDER:  Monitor need for kcal counts, pending oral intake.     Recommendations already ordered by Registered Dietitian (RD):  Oral supplements    Future/Additional Recommendations:  1. Continue diet order as per SLP. Encourage oral intake and intake of oral supplements.  2. Monitor BG control. Hgb A1c of 4.7 on 6/17/18. DM II hx.  3. Consider checking vitamin D status. Pt's vitamin D deficiency lab was 18 on 11/17/17.   4. Consider checking folic acid and vitamin B12.  5. Order a multivitamin with minerals if inadequate oral intake continues.  6. Monitor lytes (Phos, Mg++, and K+) for refeeding syndrome. If lytes trend low, aggressively replace.    7. Consider appetite stimulant if appropriate.  8. Nutrition plan as per pt's goals of care.     REASON FOR ASSESSMENT  Sohan Rendon is a/an 68 year old male assessed by the dietitian for Admission Nutrition Risk Screen for tube feeding or parenteral nutrition    NUTRITION HISTORY  As per RD note 6/14/18, pt was on a DD I + nectar-thick liquid diet and receiving Magic Cup. Oral intake at that time was poor. Pt needed feeding assistance. He did consume puree chicken, mashed potatoes, thickened apple juice, yogurt, and mixed berries. Oral intake during admission was minimal. Food choices likely affecting oral intake. Pt generally likes beef, chicken, fish, fruit, and some veggies. Dislikes ice cream and many cold foods but likes warmer foods. Pt was on TFs last admission (see below).     H & P indicates pt was admitted for VRE, malaise, and weakness. Chart indicates pt with dysphagia hx and low transit constipation. H/o PEG in 2012 and passed swallow eval 3/2014. Noting, pt with PMH of ICM, s/p Heartmate LVAD placement, and h/o DM II. Endo team has followed pt in the past. Pt was ordered to take, noting, thiamine, ProSource TF modular, and insulin PTA. Per H & P  "note 7/8, \"Mr Rendon was recently admitted from 6/6-6/22 with a new MCA stroke c/b aphasia for which he had to be started on tube feeds on discharge he was somewhat more alert and the plan was to reassess to discontinue TF once he was able to eat better...They discontinued tube feeds yesterday.\"    Per pt's family (pt confused, has expressive aphasia, and speaks Swahili), pt was recently receiving nutrition support. Pt was discharged on the below TF regimen 6/22. Per pt's daughter, the below regimen was gradually decreased and then ProSource TF modular was discontinued PTA as pt's oral intake improved.   - Feeding Tube (FT) access: NDT was placed in IR 6/7 and FT was removed just PTA.   - TF regimen: Nepro (due to K+ trending high and for BG control) 85 mL/hr x 14 hours (6p-8a) provides 1190 mL TF, 2142+ kcals , 96+ g protein, 869 mL H2O, 192 g CHO, and 15 g fiber daily.  - TF modulars: ProSource TF modular, 1 pkt TID. Each packet of ProSource TF modular provides 40 kcals and 11 g protein.   - Feeding Tube Flushes: 30 mL Q 4 hrs    CURRENT NUTRITION ORDERS  Diet: Dysphagia Level 2: Mechanically Altered + thin liquids, 2400 mg sodium diet in the comments. SLP is following.   Intake/Tolerance: Pt was tolerating diet better on 7/9 but per pt's daughter, eating less than PTA. Pt consumed 100% of a meal with a good appetite on 7/9 and 25% of a meal with a poor to fair appetite 7/10. Possible factors affecting oral intake include fatigue, confusion, and diet order. Pt continues to have a preference for warm foods.     LABS  Labs reviewed    MEDICATIONS  Medications reviewed    ANTHROPOMETRICS  Height: 1.727 m (5' 8\")    Most Recent Weight: 83.8 kg (184 lb 11.9 oz)    IBW: 70 kg   BMI: Overweight BMI 25-29.9  Weight History: 86.3 kg (5/15/17), 84.2 kg (11/26/17), 89.2 kg (5/10/18), 86.8 kg (6/14/18), 84.4 kg (6/17/18) - Wt has been variable but may be affected by fluid status. Pt received lasix recently.     Dosing Weight: " "84 kg (based on lowest wt this admission of 83.8 kg on 7/10)    ASSESSED NUTRITION NEEDS  Estimated Energy Needs: 4443-5804 kcals/day (25 - 30 kcals/kg)  Justification: Maintenance needs  Estimated Protein Needs: 101-126 grams protein/day (1.2 - 1.5 grams of pro/kg)  Justification: Increased needs with possible stress factors and with cardiac status  Estimated Fluid Needs: 1535-1521 mL/day (25 - 30 mL/kg)   Justification: Maintenance needs or per team, pending fluid status    PHYSICAL FINDINGS/OTHER FINDINGS  See malnutrition section below.    WO nurse 7/10: \"Wong left cwound due to Incontinence Associated Dermatitis (IAD) and Moisture Associated Skin Damage (MASD)  Status: initial assessment. [facility] Nurse follow-up plan: signing off.\" No note of pressure injury.     MALNUTRITION  % Intake: Decreased intake does not meet criteria  % Weight Loss: Not meeting this criteria.     Subcutaneous Fat Loss: None observed  Muscle Loss: Mild, generalized losses  Fluid Accumulation/Edema: None noted  Malnutrition Diagnosis: Patient does not meet two of the above criteria necessary for diagnosing malnutrition but is at risk for malnutrition    NUTRITION DIAGNOSIS  Inadequate oral intake related to lack of appetite, confusion, fatigue, and potentially diet order as evidenced by pt consumed 25% of meals so far today and poor to fair appetite noted.      INTERVENTIONS  Implementation  Nutrition Education: Encouraged intake of foods/beverages as per pt preference. Provided DD II menu and explained diet.   Medical food supplement therapy: Discussed oral supplement with pt's daughter. Ordered peach Boost Breeze and vanilla Boost Plus as pt has received these in the past. Provided pt's daughter with an oral supplement menu.      Goals  Patient to consume % of nutritionally adequate meal trays TID, or the equivalent with supplements/snacks.     Monitoring/Evaluation  Progress toward goals will be monitored and evaluated per " protocol.     Nutrition will continue to follow.      Sienna Flynn MS, RD, LD, [facility]   6C Pgr: [phone]

## 2018-07-10 NOTE — PROGRESS NOTES
[facility]   Cardiology II Service / Advanced Heart Failure  Daily Progress Note  Date of Service: 7/10/2018      Patient: Sohan Rendon  MRN: [id number]  Admission Date: 7/8/2018  Hospital Day # 0    Assessment and Plan: Mr. 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 He presents from ED for VRE UTI.      ICM s/p HM II LVAD. Implanted in 2012 as DT. Complicated by recurrent CVA, hemolysis, and pump thrombus.   Stage D, NYHA Class III  ACEi/ARB Losartan 25 mg po daily.   BB Toprol XL discontinued with last hospitalization.   Aldosterone antagonist Contraindicated given recent CKD.   SCD prophylaxis ICD.   Fluid status: Hypovolemic. Monitor urine output closely.   MAP: 67.  LDH: deferred given history.   Anticoagulation: INR 2.91. Continue Coumadin per pharmacy.   Antiplatelet:  mg po daily.     Protein Calorie Malnutrition with Dysphagia. Poor po intake with decreased urine output.  - Encouraged family to push po intake today. If no improvement may need consider IV fluids given low urine output.   - Speech eval given concern for worsening cough with oral intake. Family agreed to transition to DD Level II thin liquids. Will monitor cough. If improves defer further swallow eval, if remains unchanged may need to consider swallow study.   - Chest X-ray negative for concern for acute infection.   - Nutrition consult appreciated.   - Discussed at length goals of care with daughter. Discussed need for  consideration of end of life care for comfort given decompensation and poor oral intake prior to reinsertion of feeding tube.      VRE UTI with concern for Prostatitis. Treated 6/14 for 5 days with Zyvox for VRE in urine. Recurrent pyuria and dysuria per family.   - Appreciate ID consult.   - Continue Zyvox for 14 days, may need to transition to IV if intolerant of po.    -  yesterday, but unable to void today. Bladder scan with 192 in bladder this AM. Note poor po intake.   - Resumed Flomax and Proscar 7/9/18.     Acute Delirium, resolving.   - Differential consists of hospital delirium, acute infection, and medication side effects.   - Continue to monitor as is improving this afternoon.      Abdominal Distention. History of constipation and gaseous distention in the past.   - Abd X-ray consistent with nonobstructive bowel gas pattern. Results with suppository 7/9 and improved today.   - Simethicone resumed.     Chronic Medical Conditions:  HTN. Losartan 25 mg po daily   CAD and Hyperlipidemia. BB deferred in RV failure.   CKD, Stage III. Cr stable at 2.13.  Chronic LDH elevation. Likely secondary to Pump thrombus. Coumadin and ASA as above.      FEN: Cardiac diet   PROPHY: Coumadin as above.   LINES: PIV  DISPO: Anticipate discharge to home in AM.   CODE STATUS: DNR/DNI  ================================================================  Interval History/ROS: He is nonverbal and with transient confusion this AM. History obtained from his daughter. She complains of persistent cough and intermittent confusion this AM. She notes poor intake this AM.     Last 24 hr care team notes reviewed.   ROS:  4 point ROS including Respiratory, CV, GI and , other than that noted in the HPI, is negative.     Medications: Reviewed in EPIC.     Physical Exam:   BP (!) 89/71 (BP Location: Right arm)  Pulse 107  Temp 97.4  F (36.3  C) (Axillary)  Resp 24  Wt 83.8 kg (184 lb 11.9 oz)  SpO2 99%  BMI 28.09 kg/m2  GENERAL:  Appears alert and oriented times three.   HEENT: Eye symmetrical and free of discharge bilaterally. Mucous membranes moist and without lesions.  NECK: Supple and without lymphadenopathy. JVD at clavicular line upright.   CV: S1S2 present with LVAD hum.   RESPIRATORY: Respirations regular, even, and unlabored. Lungs CTA throughout.   GI: Soft and distended with normoactive bowel sounds present in all quadrants. No tenderness, rebound, guarding. No organomegaly.   EXTREMITIES: Trace bilateral peripheral edema. 2+ bilateral pedal pulses.   NEUROLOGIC: Alert and orientated x 3. CN II-XII grossly intact. No focal deficits.   MUSCULOSKELETAL: No joint swelling or tenderness.   SKIN: No jaundice. No rashes or lesions. LVAD drive line covered.     Data:  CMP  Recent Labs  Lab 07/10/18  0613 07/09/18  0628 07/08/18  1532 07/05/18  2036    137 134 139   POTASSIUM 4.0 4.1 3.9 4.3   CHLORIDE 106 108 107 112*   CO2 16* 16* 17* 22   ANIONGAP 12 13 11 6   * 144* 163* 135*   BUN 37* 37* 39* 42*   CR 2.31* 2.13* 2.13* 1.77*   GFRESTIMATED 28* 31* 31* 39*   GFRESTBLACK 34* 38* 38* 47*   JOSÉ 8.0* 8.2* 7.7* 8.1*   PROTTOTAL  --   --  6.9  --    ALBUMIN  --   --  2.6*  --    BILITOTAL  --   --  0.6  --    ALKPHOS  --   --  88  --    AST  --   --  58*  --    ALT  --   --  27  --      CBC  Recent Labs  Lab 07/10/18  0613 07/09/18  0628 07/08/18  1532 07/05/18  2036   WBC 7.8 8.8 9.2 10.0   RBC 2.91* 2.69* 2.74* 2.80*   HGB 8.0* 7.4* 7.5* 7.8*   HCT 27.0* 25.1* 25.3* 27.0*   MCV 93 93 92 96   MCH 27.5 27.5 27.4 27.9   MCHC 29.6* 29.5* 29.6* 28.9*   RDW 22.5* 22.3* 22.6* 22.3*    222 227 209     INR  Recent Labs  Lab 07/10/18  0614 07/09/18  0902 07/08/18  1642 07/05/18  2036   INR 2.91* 2.62* 2.08* 1.45*       Patient discussed with Dr. Aviles.      Zuleika Patterson FNP  7/10/2018

## 2018-07-11 NOTE — PLAN OF CARE
Problem: Patient Care Overview  Goal: Plan of Care/Patient Progress Review  1. Patient will remain hemodynamically stable  2:   Hx cva with left sided weakness  3:  Lift dependent  4;  Does not speak much.  Daughter care for him.  5:  Patient will void on his own.   Discharge Planner SLP   Patient plan for discharge: home with assist  Current status: Video swallow evaluation completed per MD order. Pt presents with mild-moderate oral-pharyngeal dysphagia, characterized by prolonted mastication, poor bolus formation/control, pharyngeal swallow delay, impaired epiglottic inversion, and elevated aspiration risk. No penetration or aspiration observed on exam, however, view limited by poor positioning and Pt report of pain, so difficult to definitively rule out aspiration. Little to no epiglottic inversion observed, so concern for poor airway protection, particularly if Pt eating/drinking in a more reclined position (was sitting upright at 90 degrees for exam). Recommend cautiously continue dysphagia diet level 2 with thin liquids as tolerated. Pt to sit completely upright for all PO intake, take small bites/sips and swallow 2x per bite/sip. Pt may benefit from repeat VFSS when mental status further clears and Pt is better able to follow commands. ST to follow for training of pharyngeal strengthening exercises as Pt can participate.  Barriers to return to prior living situation: dysphagia; TF dependence; deconditioning  Recommendations for discharge: pending progress and medical plan  Rationale for recommendations: anticipate ongoing ST needs       Entered by: Prerna Tran 07/11/2018 3:41 PM

## 2018-07-11 NOTE — PROVIDER NOTIFICATION
Patient's heart rate jumped to 150.  Vss.  No c/o pain per family.  lvad numbers ok.  md notified.  ekg pending.

## 2018-07-11 NOTE — PLAN OF CARE
Problem: Infection, Risk/Actual (Adult)  Goal: Identify Related Risk Factors and Signs and Symptoms  Related risk factors and signs and symptoms are identified upon initiation of Human Response Clinical Practice Guideline (CPG).   Outcome: No Change  D/I: Monitor shows ST 100s. VAD values within patient norms: flows increase and PIs decrease when patient sitting straight up in bed. Bladder scanned for 400 ml at 1500. Straight cathed at 1700 for 300 ml clear yellow urine with mucous and sediment. Flat affect. Required direction from daughter for cooperation with meds and cares. Not confused per daughter. Ate 3/4 of dinner with total feed. Daughter present all shift. See flowsheets for assessments and additional data.  A: Stable VAD. Continued urinary retention.   P: Encourage PO intake and offer urinal frequently. Continue current cares and notify providers with questions or concerns.    07/10/18 [id number]   Infection, Risk/Actual   Related Risk Factors (Infection, Risk/Actual) chronic illness/condition;malnutrition;skin integrity impairment   Signs and Symptoms (Infection, Risk/Actual) malaise;cultures positive

## 2018-07-11 NOTE — PLAN OF CARE
Problem: Patient Care Overview  Goal: Plan of Care/Patient Progress Review  1. Patient will remain hemodynamically stable  2:   Hx cva with left sided weakness  3:  Lift dependent  4;  Does not speak much.  Daughter care for him.  5:  Patient will void on his own.   Patient is here with a vre uti.  Tonight he has been unable to void.  Straight cathed last yesterday at 530 pm for 300 cc.  Tonight each bladder scan has shown only 260-280cc urine.  He also went from st 100s to svt vs aflutter with rate up to 151.  ekg was inconclusive.  Felt dizzy and some chest pressure with the high heart rate.(per daughter). 02 at 2l applied for comfort.  Flows dropped from 5 to 3.8.  Pi has been 2s to 3.1 tonight.  md notified and here to see him.  Given 250 cc ns bolus per orders.  This am bladder scan shows 280 cc. ( to be straight cathed for > 400 cc).   His abdomen seems more distended and his cough is congested as it was yesterday.  Amiodarone load given and after the load was done and the drip was started, he converted back to sr 90s to st 104.  Bps dropped along with his maps with the drip,so md was notified and the drip was dcd.  Has remained sr-st.  Repositioned q 2-3 hours, but has a hard time laying on his left side.  Coccyx wound cleansed and barrier cream applied.  fsg 193 during the night.  Daughter in the room with him and very attentive.  Will monitor closely and notify md of further changes or issues.

## 2018-07-11 NOTE — PROGRESS NOTES
[facility]   Cardiology II Service / Advanced Heart Failure  Device Interrogation Note  Date of Service: 7/11/2018    The patient's HeartMate LVAD was interrogated 7/11/2018  * Speed 8800 rpm   * Pulsatility index 2.9   * Power 5.2 Slaughter   * Flow 4 L/minute   Fluid status: hypovolemic   Alarms were reviewed, and notable for PI events.   The driveline exit site was covered with dressing clean, dry, and intact.   All external components were inspected and showed no evidence of damage or malfunction.    Zuleika Patterson  7/11/2018

## 2018-07-11 NOTE — PROGRESS NOTES
[facility]   Cardiology II Service / Advanced Heart Failure  Daily Progress Note  Date of Service: 7/11/2018      Patient: Sohan Rendon  MRN: [id number]  Admission Date: 7/8/2018  Hospital Day # 1    Assessment and Plan: Mr. 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 He presents from ED for VRE UTI.       ICM s/p HM II LVAD. Implanted in 2012 as DT. Complicated by recurrent CVA, hemolysis, and pump thrombus.   Stage D, NYHA Class III  ACEi/ARB Losartan 25 mg po daily.   BB Toprol XL discontinued with last hospitalization.   Aldosterone antagonist Contraindicated given recent CKD.   SCD prophylaxis ICD.   Fluid status: Hypovolemic   MAP: 78  LDH: deferred given history.   Anticoagulation: INR 3.59. Continue Coumadin per pharmacy. Likely rising in setting of poor nutrition.   Antiplatelet:  mg po daily.       SVT. Isolated burst of SVT last HS. Returned to SR with Amiodarone bolus last HS.   - Continue to monitor at this time with telemetry.   - Continue NS bolus 250 ml bolus this AM to maintain hydration.      Protein Calorie Malnutrition with Dysphagia. Poor po intake with decreased urine output. Chest X-ray negative for concern for acute infection 7/10/18.  - Received 250 ml NS bolus last HS, repeat this AM as NPO for procedure.   - Video swallow today with plan for feeding tube insertion following swallow stud.   - Nutrition consult appreciated.       VRE UTI with concern for Prostatitis. Treated  6/14 for 5 days with Zyvox for VRE in urine. Recurrent pyuria and dysuria per family.   - Appreciate ID consult.   - Continue Zyvox for 14 days, may need to transition to IV if intolerant of po.    -  yesterday, but unable to void today. Bladder scan with 192 in bladder this AM. Note poor po intake.   - Resumed Flomax and Proscar 7/9/18.      Acute Delirium, resolving.   - Differential consists of hospital delirium, acute infection, and medication side effects.   - Continue to monitor as is improving this afternoon.       Abdominal Distention. History of constipation and gaseous distention in the past. Abd X-ray consistent with nonobstructive bowel gas pattern 7/9/18.   - Simethicone resumed.   - Suppository today.     Goals of Care.   - Recommended care conference with family today, but son would like to hold off at this time.   - Discussed with both son and multiple daughters concerns for crossing ethical barriers in treatment options regarding his case given decline in chronic state over the past few months. His son notes they would like to reinsert the feeding tube at this time. Educated him on concern for need for long term use with limited options for long term support. He verbalized understanding and notes the he often flourishes at home. Recommended contacting clinic prior to ED presentation to prevent hospitalization whenever possible given he tends to do much better at home with sleep, comfort, and oral intake. His son agreed with this plan.   - Given his fragile state and complex chronic medical conditions the Cardiology team as a whole has discussed at length that we would not offer any drastic measures or surgical interventions in the future for him. Cardiology and his family will do whatever we can to divert hospitalizations as able to ensure comfort at home with his family when able.     Chronic Medical Conditions:  HTN. Losartan 25 mg po daily   CAD and Hyperlipidemia. BB deferred in RV  failure.   CKD, Stage III. Cr rising in setting of poor oral intake. Cr- 2.72 today.   Chronic LDH elevation. Likely secondary to Pump thrombus. Coumadin and ASA as above.       FEN: Cardiac diet   PROPHY: Coumadin as above.   LINES: PIV  DISPO: Anticipate discharge to home in AM.   CODE STATUS: DNR/DNI  ================================================================    Interval History/ROS: History obtained from his daughter. She notes persistent abdominal distention, but he is passing flatus this AM. She notes improvement in confusion and persistent decreased urine output with poor oral intake. She complains of persistent productive clear sputum.     Last 24 hr care team notes reviewed.   ROS:  4 point ROS including Respiratory, CV, GI and , other than that noted in the HPI, is negative.     Medications: Reviewed in EPIC.     Physical Exam:   BP (!) 82/66  Pulse 107  Temp 98.1  F (36.7  C) (Axillary)  Resp 20  Wt 82.4 kg (181 lb 10.5 oz)  SpO2 100%  BMI 27.62 kg/m2  GENERAL: Appears alert and oriented times three.   HEENT: Eye symmetrical and free of discharge bilaterally. Mucous membranes moist and without lesions.  NECK: Supple and without lymphadenopathy. JVD below clavicular line upright.   CV: S1S2 present with LVAD hum.   RESPIRATORY: Respirations regular, even, and unlabored. Lungs CTA throughout. Wheezing to upper airway, clears with cough.   GI: Soft and distended with normoactive bowel sounds present in all quadrants. No tenderness, rebound, guarding. No organomegaly.   EXTREMITIES: No peripheral edema. 2+ bilateral pedal pulses.   NEUROLOGIC: Alert and orientated x 3. CN II-XII grossly intact. No focal deficits.   MUSCULOSKELETAL: No joint swelling or tenderness.   SKIN: No jaundice. No rashes or lesions. LVAD drive line covered.     Data:  CMP  Recent Labs  Lab 07/11/18  0603 07/10/18  0613 07/09/18  0628 07/08/18  1532   * 134 137 134   POTASSIUM 4.2 4.0 4.1 3.9   CHLORIDE 104 106 108  107   CO2 16* 16* 16* 17*   ANIONGAP 12 12 13 11   * 170* 144* 163*   BUN 36* 37* 37* 39*   CR 2.72* 2.31* 2.13* 2.13*   GFRESTIMATED 23* 28* 31* 31*   GFRESTBLACK 28* 34* 38* 38*   JOSÉ 7.8* 8.0* 8.2* 7.7*   PROTTOTAL  --   --   --  6.9   ALBUMIN  --   --   --  2.6*   BILITOTAL  --   --   --  0.6   ALKPHOS  --   --   --  88   AST  --   --   --  58*   ALT  --   --   --  27     CBC  Recent Labs  Lab 07/11/18  0603 07/10/18  0613 07/09/18  0628 07/08/18  1532   WBC 8.7 7.8 8.8 9.2   RBC 2.63* 2.91* 2.69* 2.74*   HGB 7.2* 8.0* 7.4* 7.5*   HCT 23.8* 27.0* 25.1* 25.3*   MCV 91 93 93 92   MCH 27.4 27.5 27.5 27.4   MCHC 30.3* 29.6* 29.5* 29.6*   RDW 22.2* 22.5* 22.3* 22.6*    227 222 227     INR  Recent Labs  Lab 07/11/18  0603 07/10/18  0614 07/09/18  0902 07/08/18  1642   INR 3.59* 2.91* 2.62* 2.08*       Patient discussed with Dr. Aviles.      Zuleika Patterson FN  7/11/2018

## 2018-07-11 NOTE — PROGRESS NOTES
CLINICAL NUTRITION SERVICES     Nutrition Prescription      Recommendations already ordered by Registered Dietitian (RD):  Trophic TFs    Future/Additional Recommendations:  For all recs, see prior nutrition note.     Received TF Assess and Order Consult    7/11/2018: FT placed. Per radiology, the tip of the feeding tube projects in the pyloric region, likely immediately postpyloric in the first duodenal segment.    INTERVENTIONS:  Implementation:  Collaboration with other providers: Per discussion with team, decision was made to start trophic feeds at 10 mL/hr (no advancement) and re-evaluate in the morning if TFs should be further advanced toward previous goal rate of Nepro at 85 mL/hr x 14 hours. Discussed pt with RN.  Enteral Nutrition, Feeding tube flush: Ordered continuous, trophic TFs of Nepro at 10 mL/hr continuously with no advancement. Ordered patency free water flushes of 30 mL Q 4 hours.     Follow up/Monitoring:  Will continue to follow pt.    Sienna Flynn, MS, RD, LD, [facility]   6C Pgr: [phone]

## 2018-07-11 NOTE — PROGRESS NOTES
07/11/18 1528   General Information   Onset Date 07/08/18   Start of Care Date 07/11/18   Referring Physician Zuleika Patterson APRN CNP   Patient Profile Review/OT: Additional Occupational Profile Info See Profile for full history and prior level of function   Patient/Family Goals Statement Family wants Pt to eat/drink   Swallowing Evaluation Videofluoroscopic evaluation   Behaviorial Observations Confused;Lethargic  (not following commands)   Mode of current nutrition Oral diet   Type of oral diet Dysphagia diet level 2;Thin liquid   Respiratory Status Room air   Comments Orders received for VFSS to more objectively assess swallow funtion. 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 He presents from ED for VRE UTI.  The patient is known to the SLP service, most recently on dysphagia diet 2 and thin liquids in June 2018.   Clinical Swallow Evaluation   Oral Musculature unable to assess due to poor participation/comprehension   VFSS Evaluation   VFSS Additional Documentation Yes   VFSS Eval: Radiology   Radiologist resident   Views Taken left lateral   Physical Location of Procedure [facility] radiolkogy dept rm 5   VFSS Eval: Thin Liquid Texture Trial   Mode of Presentation, Thin Liquid spoon;straw;fed by clinician   Order of Presentation 1, 2, 3, 4, 5, 8, 9, 10   Preparatory Phase Poor bolus control   Oral Phase, Thin Liquid Poor AP movement;Premature pharyngeal entry   Pharyngeal Phase, Thin Liquid Delayed swallow  reflex;Residue in valleculae;Residue in pyriform sinus  (impaired epiglottic inversion)   Rosenbek's Penetration Aspiration Scale: Thin Liquid Trial Results 1 - no aspiration, contrast does not enter airway   Diagnostic Statement No penetration or aspiration visualized on exam, however, view limited due to positioning constraints and Pt report of pain   VFSS Eval: Puree Solid Texture Trial   Mode of Presentation, Puree spoon;fed by clinician   Order of Presentation 6   Preparatory Phase Poor bolus control   Oral Phase, Puree Poor AP movement   Pharyngeal Phase, Puree Delayed swallow reflex;Residue in valleculae;Residue in pyriform sinus  (impaired epiglottic inversion)   Rosenbek's Penetration Aspiration Scale: Puree Food Trial Results 1 - no aspiration, contrast does not enter airway   Diagnostic Statement no penetration or aspiration visualized on exam, however, view limited by poor positioning and Pt report of pain   VFSS Eval: Solid Food Texture Trial   Mode of Presentation, Solid fed by clinician;spoon   Order of Presentation 7   Preparatory Phase Poor bolus control  (prolonged/inefficient mastication)   Oral Phase, Solid Premature pharyngeal entry;Poor AP movement   Pharyngeal Phase, Solid Delayed swallow reflex;Residue in valleculae;Residue in pyriform sinus  (impaired epiglottic inversion)   Rosenbek's Penetration Aspiration Scale: Solid Food Trial Results 1 - no aspiration, contrast does not enter airway   Diagnostic Statement No penetration or aspiration observed on exam, however, view limited by poor positioning and Pt report of pain   Swallow Compensations   Swallow Compensations Pacing;Reduce amounts   Esophageal Phase of Swallow   Patient reports or presents with symptoms of esophageal dysphagia No   General Therapy Interventions   Planned Therapy Interventions Dysphagia Treatment   Dysphagia treatment Instruction of safe swallow strategies;Oropharyngeal exercise training   Swallow Eval: Clinical  Impressions   Skilled Criteria for Therapy Intervention Skilled criteria met.  Treatment indicated.   Functional Assessment Scale (FAS) 4   Dysphagia Outcome Severity Scale (KYRA) Level 4 - KYRA   Treatment Diagnosis mild to moderate oropharyngeal dypshagia   Diet texture recommendations Dysphagia diet level 2;Thin liquids   Recommended Feeding/Eating Techniques alternate between small bites and sips of food/liquid;maintain upright posture during/after eating for 30 mins;small sips/bites   Demonstrates Need for Referral to Another Service occupational therapy;physical therapy   Therapy Frequency 5 times/wk   Predicted Duration of Therapy Intervention (days/wks) 2 weeks   Anticipated Discharge Disposition home w/ assist   Risks and Benefits of Treatment have been explained. Yes   Patient, family and/or staff in agreement with Plan of Care Yes   Clinical Impression Comments Video swallow evaluation completed per MD order. Pt presents with mild-moderate oral-pharyngeal dysphagia, characterized by prolonted mastication, poor bolus formation/control, pharyngeal swallow delay, impaired epiglottic inversion, and elevated aspiration risk. No penetration or aspiration observed on exam, however, view limited by poor positioning and Pt report of pain, so difficult to definitively rule out aspiration. Little to no epiglottic inversion observed, so concern for poor airway protection, particularly if Pt eating/drinking in a more reclined position (was sitting upright at 90 degrees for exam). Recommend cautiously continue dysphagia diet level 2 with thin liquids as tolerated. Pt to sit completely upright for all PO intake, take small bites/sips and swallow 2x per bite/sip. Pt may benefit from repeat VFSS when mental status further clears and Pt is better able to follow commands. ST to follow for training of pharyngeal strengthening exercises as Pt can participate.   Total Evaluation Time   Total Evaluation Time (Minutes) 20

## 2018-07-11 NOTE — PHARMACY-CONSULT NOTE
Pharmacy Tube Feeding Consult    Medication reviewed for administration by feeding tube and for potential food/drug interactions.    Recommendation: No changes are needed at this time.     Pharmacy will continue to follow as new medications are ordered.    Lyly Vasquez, LtaoyaD

## 2018-07-12 NOTE — PLAN OF CARE
Problem: Patient Care Overview  Goal: Plan of Care/Patient Progress Review  1. Patient will remain hemodynamically stable  2:   Hx cva with left sided weakness  3:  Lift dependent  4;  Does not speak much.  Daughter care for him.  5:  Patient will void on his own.   Discharge Planner SLP   Patient plan for discharge: not stated  Current status: Pt seen for swallow tx- pt had VFSS yesterday- recommendation was for cautiously initiating a DD2 diet with thin liquids- per notes from SLP- reduced epiglottic inversion so concern for airway protection- no aspiration actually visualized on study but poor positioning limited full view so pt could be at potential risk of aspiration. With today's visit- pt very sleepy- could awake but often falling back asleep. With assit of family member present- boosted pt and repositioned to more upright position. Pt with limited ability to follow commands- no able to follow cues for pharyngeal excs. Attempted po trials - needing much encouragement to take from spoon or cup, No immediate overt aspiration s/s with small single sips by cup or straw but 1x pt did have a delayed cough. With trials of DD2 - no overt aspiration s/s but overall reduced bolus formation and pt also spitting some out- needs cues to cooperate and take from spoon. Educated pt's family member- daughter on types of foods allowed on DD2 diet if the plan if for pt to go home- daughter verbalized understanding. Recommending cautiously continue with current DD2 diet with thin liquids - but if positive s/s of aspiration are noted ( ie increased coughing, throat clearing etc) then d/c po; pt needs to be upright for all po, full assist and supervision, small single bites/sips, alternate solids and liquids, multiple swallow as needed, pace self-- eat slowly. TF also to continue for nutritinal support SLP to f/u for diet tolerance/ education.   Barriers to return to prior living situation: dysphagia, TF dependence,  deconditioning  Recommendations for discharge: pending progress and medical plan  Rationale for recommendations: likely ongoing sptx for dysphagia       Entered by: Rachel Dill 07/12/2018 11:28 AM

## 2018-07-12 NOTE — PROGRESS NOTES
Care Coordinator - Discharge Planning    Admission Date/Time:  7/8/2018  Attending MD:  Dafne Aviles     Data  Date of initial CC assessment: 7/9/2018  Chart reviewed, discussed with interdisciplinary team.   Patient was admitted for:   1. Urinary tract infection without hematuria, site unspecified    2. Acute kidney injury (H)    3. LVAD (left ventricular assist device) present (H)    4. SOB (shortness of breath)    5. Shortness of breath         Assessment   Full assessment completed in previous note.   Concerns with insurance coverage for discharge needs: None.  Current Living Situation: Patient lives with family.  Support System: Supportive and Involved family.   Services Involved: [facility] monitoring, [facility], PCA services.   Transportation at Discharge: [facility] [employer] stretcher ride.   Transportation to Medical Appointments: same.   Barriers to Discharge: per NP, pt is not yet medically ready for discharge.     Coordination of Care and Referrals: Provided patient/family with options for home infusion and resumption of home care. Pt's daughter stated that pt was receiving his tube feedings through [facility] until they were discontinued recently, all supplies were returned, but they would like to use them again for tube feeding needs. Per NP, pt had feeding tube placed yesterday and will discharge to home on TF today, pending afternoon labs. Per NP, pt will not need tube feedings running on transport.   Pt's daughter asked about   Intervention  Referral made to [facility] (Ph: [phone]) for home tube feeding supplies.   Arrangements made with [facility] (Ph: [phone] Fax: [phone]) for resumption of Palliative Care Program, RN obdulia post hospitalization, assess vital signs, respiratory and cardiac status, activity tolerance, hydration, nutritional status, med set up and management. INR lab draws; with results to the [facility], next due  on 7/13/2018.     Ride set up with [facility] [employer] [employer] [facility] (Ph: [phone]) for stretcher ride to home today at 4:30pm.  Plan  Anticipated Discharge Date: 7/12/2018 at 4:30pm  Anticipated Discharge Plan: Discharge to home with home care and home infusion    CTS Handoff completed:  YES  Joanna Roberts RN BSN  6C Unit Care Coordinator  Phone number: [phone]  Pager: [phone]

## 2018-07-12 NOTE — PROVIDER NOTIFICATION
Pt's PIs noted to be in the low 2s. Flows +++. LVAD numbers to pt's baseline shortly after. BP soft throughout day.   MD notified, no further orders at this time.   Will continue to monitor

## 2018-07-12 NOTE — PLAN OF CARE
Problem: Patient Care Overview  Goal: Plan of Care/Patient Progress Review  1. Patient will remain hemodynamically stable  2:   Hx cva with left sided weakness  3:  Lift dependent  4;  Does not speak much.  Daughter care for him.  5:  Patient will void on his own.   Outcome: No Change  Placed NPO except medications for Video Swallow in Radiology with Speech, N & V after receiving AM medications crushed in applesauce, medicated with IV Zofran with relief. AM Cr. Up to 2.72, patient unable to void, bladder scans done intermittently t/o day all under straight cath parameters of 400 cc, Cards 2 Fellow notified of no UO today, planned for repeat bladder scan at 2100, MD declined daughter's request for Mcarthur Catheter, patient later incontinent large amount of urine, given Dulcolax Suppository with smear results, passing gas T/O day. Sent at 1430 for Swallow Study, patient sent directly for FT placement, per discussion with Radiology Resident FT unable to pass FT to desired area, now just past P[place] but OK to give medications and low volume TF, spoke with Dietician, plan to start Nepro @ 10 cc/hour overnight and reevaluate in AM. INR 3.59, no Coumadin tonight. Monitor shows ST low 100's. Refer to flow sheets for full assessments, VS, FSG, labs etc.

## 2018-07-12 NOTE — DISCHARGE SUMMARY
[facility]   Cardiology II Service / Advanced Heart Failure  Discharge Summary     Sohan Rendon MRN# [id number]   YOB: 1950 Age: 68 year old     DATE OF ADMISSION:  7/8/2018  DATE OF DISCHARGE:  7/12/2018  ADMITTING PROVIDER: Dafne Aviles MD  DISCHARGE PROVIDER: Zuleika HOWARD/Dr. Aviles   PRIMARY PROVIDER:   Shilpi Agosto    ADMIT DIAGNOSES:   1. VRE UTI with concern for Prostatitis     DISCHARGE DIAGNOSES:   1. Chronic SCHF s/p HM II LVAD  2. SVT, resolved  3. LVAD thrombus  4. Protein Calorie Malnutrition with Dysphagia  5. Transient Acute Delirium  6. Chronic Abdominal Distention  7. HTN  8. CAD  9. Hyperlipidemia  10. HEATH on CKD Stage III  11. BPH with urinary retention  12. Recurrent Ischemic CVA      HPI: Please see the detailed H & P by Dr. Butler from 7/8/2018. Sohan Rendon is a 67 yo M with a history of ICM s/p HM II in 2012 as destination therapy with a course complicated by hemolysis, pump thrombosis and multiple strokes (subcortical, R PICA, R MCA), PFO s/p closure in 2012,MVr with carbomedics ring,  ICD placement in 2011, latent TB, PFO s/p closure in 2012 and CKD, chronic R femoral fracture who is admitted with weakness and malaise and concern for UTI after a UCx grew VRE.     Mr Rendon was recently admitted from 6/6-6/22 with a new MCA stroke c/b aphasia for which he had to be started on tube feeds on discharge he was somewhat more alert and the plan was to reassess to discontinue TF once he was able to eat better. His hospitalization was c/b a UTI with VRE for which he received a 5d course of linezolid per ID recommendations. They discontinued tube feeds yesterday.     He then presented to the hospital on July/5 with chest pain and increased cough production and decreased UOP. Labs were significant for a SCr of 1.7 . hois UA was positive and after questioning family mentioned dysuria. They minyd a urine culture and prescribed him keflex.He had a CT of  his abdomen that showed non obstructive gas pattern stable support devices and L>R bibasilar pulmonary opacities.  The UCx from that day is positive for VRE sensitive to linezolid so they called the patient's family and told him to come to the ED.       On presentation he HR was tachy in the 110's MAP's  In the high 60's -70's. His labs are significant for a WBC of 9.2 (was 10 prior to d/c 6/28) BUN 39 and SCr 2.13 (up from 42/1.77 two days ago sodium 134 albumin 2.6, LDH is 1192 in the setting of a known LVAD thrombus and procalcitonin is negative. CXR shows some pulmonary congestion and small b/l effusions.       His daughter does note some coughing with whitish sputum and orthopnea with occasional wheezing no fevers.    PHYSICAL EXAM:  Blood pressure (!) 77/59, pulse 107, temperature 97.8  F (36.6  C), temperature source Axillary, resp. rate 16, weight 85.7 kg (188 lb 15 oz), SpO2 98 %.  GENERAL: Appears alert and oriented times three.   HEENT: Eye symmetrical and free of discharge bilaterally. Mucous membranes moist and without lesions.  NECK: Supple and without lymphadenopathy. JVD 10 cm.   CV: S1S2 present with LVAD hum.   RESPIRATORY: Respirations regular, even, and unlabored. Lungs CTA throughout.   GI: Soft and distended with normoactive bowel sounds present in all quadrants. No tenderness, rebound, guarding. No organomegaly.   EXTREMITIES: Trace bilateral peripheral edema. 2+ bilateral pedal pulses.   NEUROLOGIC: Alert and orientated x 3. CN II-XII grossly intact. No focal deficits.   MUSCULOSKELETAL: No joint swelling or tenderness.   SKIN: No jaundice. No rashes or lesions. LVAD drive line covered.     LABS:   Last CBC:   Recent Labs   Lab Test  07/11/18   0603   WBC  8.7   RBC  2.63*   HGB  7.2*   HCT  23.8*   MCV  91   MCH  27.4   MCHC  30.3*   RDW  22.2*   PLT  215       Last CMP:  Recent Labs   Lab Test  07/12/18   1256   07/08/18   1532   NA  132*   < >  134   POTASSIUM  3.8   < >  3.9   CHLORIDE   105   < >  107   JOSÉ  7.5*   < >  7.7*   CO2  15*   < >  17*   BUN  43*   < >  39*   CR  2.99*   < >  2.13*   GLC  186*   < >  163*   AST   --    --   58*   ALT   --    --   27   BILITOTAL   --    --   0.6   ALBUMIN   --    --   2.6*   PROTTOTAL   --    --   6.9   ALKPHOS   --    --   88    < > = values in this interval not displayed.       IMAGING:  Results for orders placed or performed during the hospital encounter of 07/08/18   XR Chest 1 View    Narrative    XR CHEST 1 VW  7/8/2018 4:22 PM      HISTORY: Shortness of breath;     COMPARISON: 7/5/2018    FINDINGS: Left ventricular assist device. Sternotomy wires.  Mediastinal surgical clips. Left chest implanted cardiac defibrillator  lead projects over the right ventricle. Enlarged cardiac silhouette.  Increased pulmonary vascular congestion. No pneumothorax. Bilateral  pleural effusions. No acute osseous abnormalities.      Impression    IMPRESSION:   1. Pulmonary vascular congestion/pulmonary edema has increased from  comparison radiographs 7/5/2018.  2. Small bilateral pleural effusions.  3. Enlarged cardiac silhouette.    I have personally reviewed the examination and initial interpretation  and I agree with the findings.    IGNACIO SILVA MD   XR Abdomen Port 1 View    Narrative    XR ABDOMEN PORT 1 VW  7/9/2018 3:29 PM      HISTORY: abdominal distention     COMPARISON: 7/5/2018    FINDINGS: Supine views of the abdomen were obtained. Left ventricular  assist device. Common bile duct stent. Mild to moderate colonic stool  burden. Feeding tube has been removed. Chronic fracture deformity of  the right femur. Nonobstructive bowel gas pattern. Lumbosacral  degenerative changes.      Impression    IMPRESSION: Decreased colonic stool burden with respect to radiograph  on 7/5/2018. Nonobstructive bowel gas pattern.    I have personally reviewed the examination and initial interpretation  and I agree with the findings.    STEPHANIE RODRÍGUEZ MD   XR Chest Port 1 View     Narrative    EXAM: XR CHEST PORT 1 VW  7/10/2018 11:20 AM     HISTORY:  cough;        COMPARISON:  View of the chest performed on 7/8/2018.    TECHNIQUE: Upright AP single view of the chest.    FINDINGS: The trachea is midline and patent. Left ventricular assist  device. Sternotomy wires.  Mediastinal surgical clips. Left chest implanted cardiac defibrillator  lead projects over the right ventricle The Cardiomediastinal  silhouette is enlarged and unchanged compared to previous image.  Bilateral reticular opacities suggesting pulmonary congestion. The  osseous thorax and soft tissues are without abnormality. Bilateral  small pleural effusions. There is no radiographic evidence of  pneumothorax.      Impression    IMPRESSION:   1. Pulmonary vascular congestion/pulmonary edema demonstrates  improvement as compared to the study performed on 7/20/2018.  2. Unchanged small bilateral pleural effusions.  3. Unchanged Enlarged cardiac silhouette.    I have personally reviewed the examination and initial interpretation  and I agree with the findings.    KINSEY HATCH MD   XR Video Swallow w/o Esophagram    Narrative    EXAMINATION: MODIFIED BARIUM SPEECH AND SWALLOW 7/11/2018 4:09 PM      CLINICAL HISTORY: Evaluate for aspiration    COMPARISON: No direct comparisons available, CT chest 3/7/2018    PROCEDURE COMMENTS:   Fluoroscopy time: 1.8minute low-dose pulsed  Contrast: The patient was fed barium in the following manner and  consistencies: Thin barium    Patient position: Lateral view slightly recumbent in the upright  sitting position.    FINDINGS:  Limited study secondary to overlying shoulder shadows secondary to  body habitus.    No convincing evidence of aspiration.  Stasis of contrast in the  valleculae.     Please refer to the speech pathology note for the complete evaluation.      Impression    IMPRESSION:  Limited study secondary to overlying shoulder shadows secondary to  body habitus.  1. No convincing  evidence of aspiration.   2. Please refer to the speech pathology note for the complete  evaluation.    I have personally reviewed the examination and initial interpretation  and I agree with the findings.    JENNIFER KUNZ MD   XR Feeding Tube Placement    Narrative    EXAM: XR FEEDING TUBE PLACEMENT  7/11/2018 4:10 PM      HISTORY: concern for aspiration and poor po intake for malnutrition;     COMPARISON: Radiograph 7/9/2018, CT 5/1/2018     TECHNIQUE: Lidocaine jelly was applied to the left nostril. The  feeding tube was advanced under the fluoroscopic guidance . A small  amount of barium contrast was injected to confirm the position of the  tip.    FLUOROSCOPY TIME: 10.7 minutes    FINDINGS:     Partially seen LVAD projecting over the left lower hemithorax.     The tip of the feeding tube projects in the pyloric region, likely  immediately postpyloric in the first duodenal segment. Non-obstructive  bowel gas pattern.      Impression    IMPRESSION:   The tip of the feeding tube projects in the postpyloric pyloric  region, likely immediately postpyloric in the first duodenal segment.  If this is inadequate, a further attempt can be made tomorrow to  advancement.  The procedure was stopped secondary to patient  discomfort after 10 minutes of fluoroscopy time.    I have personally reviewed the examination and initial interpretation  and I agree with the findings.    JENNIFER KUNZ MD   XR Feeding Tube Reposition    Narrative    EXAM: XR FEEDING TUBE REPOSITION  7/12/2018 1:53 PM      HISTORY: advancement of feeding tube;     COMPARISON: Fluoroscopy study 7/11/2018, radiograph 7/9/2018, CT  5/1/2018    TECHNIQUE: Lidocaine jelly was applied to the nostrils. The already  existing feeding tube was advanced under the fluoroscopic guidance . A  small amount of barium contrast was injected to confirm the position  of the tip.    FLUOROSCOPY TIME: Total 5.2 minutes    FINDINGS: Adjustment of the already existing  feeding tube. The tip of  the feeding tube now projects in the third segment of the duodenum.  Non-obstructive bowel gas pattern. The tube was secured using using  nasal bridals.      Impression    IMPRESSION: Adjustment of the already existing feeding tube. The tip  of the feeding tube now projects in the third segment of the duodenum.  The tube is ready to use.    I, JENNIFER KUNZ MD, attest that I was present for all critical  portions of the procedure and was immediately available to provide  guidance and assistance during the remainder of the procedure.  .     I have personally reviewed the examination and initial interpretation  and I agree with the findings.    JENNIFER KUNZ MD     *Note: Due to a large number of results and/or encounters for the requested time period, some results have not been displayed. A complete set of results can be found in Results Review.       PROCEDURES:  NJ tube inserted 7/8/18    HOSPITAL COURSE:   ICM s/p HM II LVAD. Implanted in 2012 as DT. Complicated by recurrent CVA, hemolysis, and pump thrombus. Note BNP 16131 during hospitalization. Edema improving on X-ray. Persistent chronic abdominal distention with mild elevation in neck veins in setting of poor po intake. Recommended prn Lasix 20 mg po daily at home for worsening abdominal distention and fluid retention.   Stage D, NYHA Class III  ACEi/ARB Losartan discontinued prior to discharge in setting of rising Cr.    BB Toprol XL discontinued with last hospitalization.   Aldosterone antagonist Contraindicated given recent CKD.   SCD prophylaxis ICD.   Fluid status: Mild intravascular hypervolemia  MAP: 67.   LDH: deferred given history.   Anticoagulation: INR 3.13. Continue Coumadin per pharmacy at Coumadin 1 mg po daily with repeat INR per HHC RN in AM. Likely rising in setting of poor nutrition with anticipation for improvement with resumption of TF. INR goal 1.8-2.5.  Antiplatelet:  mg po daily.        SVT.  Isolated burst of SVT last HS. Returned to SR with Amiodarone bolus 7/11/18. NS bolus 250 ml bolus this AM for hydration.     HEATH on CKD Stage III. Baseline Cr 1.5-2. Peaked at 2.99 prior to discharge. Note exacerbation with both IV diuresis and fluid administration. Given poor po intake defer aggressive diuresis at this time. Physical exam notes mild hypervolemia with significantly elevated BNP. Recommended Lasix 20 mg daily prn at home with increased abdominal distention. Recurrent bladder scan notes he continues to produce urine and he voided independently prior to discharge without evidence of elevated PVR. Defer Renal US at this time. Flomax and Proscar resumed prior to discharge. Losartan discontinued at discharge. Anticipate improvement with resumption of TF. Repeat BMP Monday per C RN.     Protein Calorie Malnutrition with Dysphagia and mild metabolic acidosis. Poor po intake with decreased urine output. Chest X-ray negative for concern for acute infection or aspiration 7/10/18. Received total of 500 ml NS bolus to maintain hydration while awaiting TF insertion. Video swallow completed with limited vision due to posture. TF reinserted. Nutrition consult appreciated. He will discharge to home on Nepro 50 ml/hr for 24 hours given prior concern for prior TF intolerance with worsening abdominal distention and vomiting. Note he tolerated dose prior to discharge.       VRE UTI with concern for Prostatitis. Treated 6/14 for 5 days with Zyvox for VRE in urine. Recurrent pyuria and dysuria per family.  Appreciate ID consult. Continue Zyvox for 10 days to complete a 14 day course. Recurrent bladder scan without evidence of significant PVR. Note poor po intake. Resumed Flomax and Proscar 7/9/18.       Acute Delirium, resolving. Differential consists of hospital delirium, acute infection, and medication side effects. Mentation improved today.       Abdominal Distention. History of constipation and gaseous distention  in the past. Abd X-ray consistent with nonobstructive bowel gas pattern 7/9/18. Simethicone resumed. Suppository with adequate results 7/11. Will continue aggressive bowel regimen at home.      Goals of Care. Recommended care conference with family, but they deferred during hospitalization. Discussed with both son and multiple daughters concerns for crossing ethical barriers in treatment options regarding his case given decline in chronic state over the past few months. His son notes they would like to reinsert the feeding tube at this time. Educated him on concern for need for long term use with limited options for long term support. He verbalized understanding and notes the he often flourishes at home. Recommended contacting clinic prior to ED presentation to prevent hospitalization whenever possible given he tends to do much better at home with sleep, comfort, and oral intake. His son agreed with this plan. Given his fragile state and complex chronic medical conditions the Cardiology team as a whole has discussed at length that we would not offer any drastic measures or surgical interventions in the future for him. Cardiology and his family will do whatever we can to divert hospitalizations as able to ensure comfort at home with his family when able.     Chronic Medical Conditions:  HTN. Losartan discontinued with rising Cr.   CAD and Hyperlipidemia. BB deferred in RV failure.   Chronic LDH elevation. Likely secondary to Pump thrombus. Coumadin and ASA as above.     DISCHARGE MEDICATIONS:  Current Discharge Medication List      START taking these medications    Details   linezolid (ZYVOX) 600 MG tablet Take 1 tablet (600 mg) by mouth every 12 hours for 10 days  Qty: 20 tablet, Refills: 0    Associated Diagnoses: Urinary tract infection without hematuria, site unspecified         CONTINUE these medications which have CHANGED    Details   warfarin (COUMADIN) 1 MG tablet Take 1 tablet (1 mg) by mouth daily Or as  directed after INR dosing changes  Qty: 120 tablet, Refills: 11    Associated Diagnoses: LVAD (left ventricular assist device) present (H)         CONTINUE these medications which have NOT CHANGED    Details   acetaminophen (TYLENOL) 32 mg/mL solution 20.3 mLs (650 mg) by Oral or Feeding Tube route every 6 hours as needed for mild pain  Qty: 300 mL, Refills: 3    Associated Diagnoses: Aphasia      aspirin 325 MG tablet Take 1 tablet (325 mg) by mouth daily  Qty: 120 tablet, Refills: 3    Comments: COUMADIN CLINIC INCREASED  MG AFTER PATIENT'S LAST STROKE  Associated Diagnoses: LVAD (left ventricular assist device) present (H); Chronic congestive heart failure, unspecified congestive heart failure type (H)      bisacodyl (DULCOLAX) 10 MG Suppository Place 1 suppository (10 mg) rectally daily as needed for constipation  Qty: 5 suppository, Refills: 1    Associated Diagnoses: Drug-induced constipation      finasteride (PROSCAR) 5 MG tablet Take 1 tablet (5 mg) by mouth daily Patient needs to be seen in clinic prior to additional refills.  Qty: 30 tablet, Refills: 0    Associated Diagnoses: BPH (benign prostatic hyperplasia)      levETIRAcetam (KEPPRA) 100 MG/ML solution Take 7.5 mLs (750 mg) by mouth every 12 hours  Qty: 500 mL, Refills: 3    Associated Diagnoses: Seizure (H)      loratadine (CLARITIN) 5 MG/5ML syrup 10 mLs (10 mg) by Oral or Feeding Tube route daily  Qty: 300 mL, Refills: 3    Associated Diagnoses: Aphasia      Melatonin 10 MG TABS tablet Take 1 tablet (10 mg) by mouth At Bedtime  Qty: 30 tablet, Refills: 11    Associated Diagnoses: Insomnia due to medical condition      nitroGLYcerin (NITROSTAT) 0.4 MG sublingual tablet Place 1 tablet (0.4 mg) under the tongue every 5 minutes as needed for chest pain  Qty: 30 tablet, Refills: 1    Associated Diagnoses: Coronary artery disease involving native coronary artery of native heart with unstable angina pectoris (H)      ondansetron (ZOFRAN) 4 MG/5ML  solution 5 mLs (4 mg) by Per Feeding Tube route 2 times daily as needed for nausea or vomiting  Qty: 90 mL, Refills: 0      pantoprazole (PROTONIX) SUSP suspension 10 mLs (20 mg) by Oral or Feeding Tube route 2 times daily  Qty: 400 mL, Refills: 3    Associated Diagnoses: Cerebrovascular accident (CVA), unspecified mechanism (H); LVAD (left ventricular assist device) present (H)      senna-docusate (SENOKOT-S;PERICOLACE) 8.6-50 MG per tablet Take 1-2 tablets by mouth 2 times daily as needed for constipation      simethicone (MYLICON) 80 MG chewable tablet Take 80 mg by mouth every 6 hours as needed for flatulence or cramping      tamsulosin (FLOMAX) 0.4 MG capsule Take 2 capsules (0.8 mg) by mouth daily  Qty: 60 capsule, Refills: 11    Associated Diagnoses: Incomplete bladder emptying      thiamine (VITAMIN B-1) 100 MG tablet Take 1 tablet (100 mg) by mouth daily  Qty: 90 tablet, Refills: 3    Associated Diagnoses: Cerebrovascular accident (CVA) due to embolism of right middle cerebral artery (H)      ALEK CONTOUR test strip USE TO TEST BLOOD SUGAR 2 TIMES DAILY OR AS DIRECTED.  Qty: 100 strip, Refills: 2    Associated Diagnoses: Hypoglycemia      blood glucose monitoring (ALEK MICROLET) lancets USE TO TEST BLOOD SUGAR 2 TIMES DAILY OR AS DIRECTED  Qty: 100 each, Refills: 2    Comments: WAO  Associated Diagnoses: Diabetes mellitus, type 2 (H)      blood glucose monitoring (NO BRAND SPECIFIED) lancets Use to test blood sugars 2 times daily or as directed.  Qty: 1 Box, Refills: 3    Associated Diagnoses: Diabetes mellitus, type 2 (H)      !! order for DME Equipment being ordered: Nebulizer  Qty: 1 each, Refills: 11    Associated Diagnoses: Pulmonary atelectasis      !! order for DME Bilateral heel protective cushioning boots.  Dx: previous stroke with left hemiplegia.  Duration of need: 99 months.  Qty: 2 each, Refills: 0    Associated Diagnoses: Cerebral 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  (H)      !! order for DME Equipment being ordered: Cushioned heel boots.  Qty: 2 each, Refills: 0    Associated Diagnoses: 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)      !! order for DME Equipment being ordered: Wheelchair, manual, with elevated leg rests and tilt in space back.  [facility] I [facility] them 11/26/16 and they requested the new order.  Face to face is in my 10/26/16 note.  Qty: 1 each, Refills: 0    Associated Diagnoses: 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); Displaced fracture of right femoral neck, closed, with nonunion, subsequent encounter      !! order for DME Equipment being ordered: Wheelchair, manual.  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 order for DME Equipment being ordered: Hospital Bed, fully electric.  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 order for DME Equipment being ordered: Reclining manual w/c with bilateral elevating leg rests.  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 order for DME Equipment being ordered: Bilateral leg supports for manual wheelchair.  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 ORDER FOR DME Equipment being ordered: Lift chair.    Please see indications and face-to-face encounter details in 2/3/15  "Office Visit note.  Qty: 1 Device, Refills: 0    Associated Diagnoses: Fracture of femoral neck, right, closed, initial encounter; Stroke (H); CHF (congestive heart failure), NYHA class IV (H)      protein modular (PROSOURCE TF) 1 packet by Per Feeding Tube route 3 times daily  Qty: 90 packet, Refills: 3    Associated Diagnoses: Cerebrovascular accident (CVA), unspecified mechanism (H)       !! - Potential duplicate medications found. Please discuss with provider.      STOP taking these medications       cephalexin (KEFLEX) 250 MG/5ML suspension Comments:   Reason for Stopping:         losartan (COZAAR) 2.5 mg/mL SUSP Comments:   Reason for Stopping:               DISCHARGE DISPOSITION: Sohan Rendon will discharge to home in stable condition.     DISCHARGE INSTRUCTIONS:    Discharge Procedure Orders  Home care nursing referral   Referral Type: Home Health Therapies & Aides     Medication Therapy Management Referral   Referral Type: Med Therapy Management     Home infusion referral     Reason for your hospital stay   Order Comments: You were admitted to the hospital for an infection in your bladder. You will discharge to home on an oral antibiotic for further treatment. Please notify your Cardiologist for fever, chills, worsening abdominal distention, chest pain, worsening shortness of breath, and recurrent vomiting.    Questions and [phone].   First press #1 for the [employer] and then press #3 for \"Medical Questions\" to reach us Cardiology Nurses.   On Call Cardiologist for after hours or on weekends: [phone] option #4 and ask to speak to the on-call Cardiologist. Inform them you are a CORE/heart failure patient at the [facility].     Activity   Order Comments: Your activity upon discharge: activity as tolerated   Order Specific Question Answer Comments   Is discharge order? Yes      Wound care and dressings   Order Comments: Instructions to care for your wound at home: LVAD dressing change as " prior to admission.     Follow Up and recommended labs and tests   Order Comments: INR Friday per C RN.  Repeat BMP Monday per [facility] RN  Follow up with Maureen Grant 7/18/18 at 2 PM.     DNR/DNI     Adult Formula Drip Feeding   Order Comments: Nepro at 50 ml/hr for 24 hours.   Free water 30 ml every 4 hours     Diet   Order Comments: Follow this diet upon discharge: - Low sodium diet, Dysphagia level II with thin liquids   Order Specific Question Answer Comments   Is discharge order? Yes          45 minutes spent in discharge, including >50% in counseling and coordination of care, medication review and plan of care recommended on follow up. Please do not hesitate to contact me should there be questions regarding the hospital course or discharge plan.      Zuleika DANIELP-C  7/12/2018  [phone]

## 2018-07-12 NOTE — PROGRESS NOTES
Therapy: Enteral  Insurance:[facility]    Co-Insurance: 100%    In reference to admission 7/8/18 to check enteral feeds coverage    Please contact Intake with any questions, [phone] or In Basket pool, [facility] ([phone]).

## 2018-07-12 NOTE — PROGRESS NOTES
During my attempted visit, pt/family is not family.     Will continue to provide support to pt/family during their hospitalization at least 1x/wk.

## 2018-07-12 NOTE — PROGRESS NOTES
This note is to document a conversation held between the daughter and our team today regarding goals of care.     The family states that Mr. Rendon has had further decline since his stroke.  Even prior to this stroke he required 24-hour care.  Since then he has poor swallowing and signs of aspiration and the family has requested an NJ tube which we have placed today.      I have discussed with the family today (after getting agreement among our LVAD team) that we no longer recommend and are not going to offer invasive medical procedures and tests.  He is bedbound , has a chronic pump thrombosis , is aspirating , has worsening renal function and heart failure, and would likely not do well with any surgery (ie, a PEG tube).  A PEG tube would also not make sense as this is not a bridge to a higher level of functioning.  The family is in agreement today that invasive tests and procedures are not within the goals of care.   They too would like to maximize time at home with less clinic visits and less interventions.     They are not quite at the point of hospice as they would like to have the option of coming to the  hospital for things such as IV antibiotics.      We talked with then today about calling the LVAD coordinator on call and/or the on-call fellow on call before coming to the ER so that we can manage his symptoms from home to try and limit ER/inpatinet stays.  We are working on setting up outpatient palliative care.     Dafne Aviles

## 2018-07-12 NOTE — PROGRESS NOTES
CLINICAL NUTRITION SERVICES     Nutrition Prescription    RECOMMENDATIONS FOR MDs/PROVIDERS TO ORDER:  Adjust free water flushes via feeding tube as needed, pending fluid status. Currently, free water flushes are minimal, or for patency.      Recommendations already ordered by Registered Dietitian (RD):  Ordered to advance TFs    Future/Additional Recommendations:  For all recommendations, see prior nutrition note.     Received TF Assess & Order consult    Diet: DD II + thin liquid diet order since 7/10. SLP is following. Pt is ordered to receive Boost Plus (vanilla at 10:00 and HS), Boost Breeze (peach at 14:00).     TFs: Nepro at 10 mL/hr via NDT.     INTERVENTIONS:  Implementation:  Collaboration with other providers, Enteral Nutrition: Discussed pt with team and with RN. Per team, feeding tube is currently post-pyloric but radiology will be further advancing and placing a bridle today (7/12). TFs. Advanced TFs to rate of 40 mL/hr. If TFs are tolerated, rate to advance to continuous goal rate of 50 mL/hr this afternoon (as tolerated by pt) to help ensure GI tolerance. Plan for continuous TFs at this time (h/o N/V and distention at home, at times, and pt was on cycled TFs). If/when transitioning to cycled feeds, increase by ~10-15 mL Q 8 hours to goal rate of 85 mL/hr x 14 hours (6p-8a or timing per pt preference). Prosource TF modular, 1 pkt TID, was already ordered.   Nutrition education, Medical food supplement therapy: Per care coordinator, pt likes vanilla Boost Plus. Gave coupons to pt's daughter and explained where to obtain Boost Plus or similar products.     Follow up/Monitoring:  Will continue to follow pt.    Sienna Flynn, MS, RD, LD, CNSC   6C Pgr: [phone]

## 2018-07-12 NOTE — PROVIDER NOTIFICATION
MD notified that pt's flows now maintaining +++, naranjo in the 7s, PIs in the 2s. All other VSS.  No further orders. Will continue to monitor.

## 2018-07-12 NOTE — PLAN OF CARE
Problem: Patient Care Overview  Goal: Plan of Care/Patient Progress Review  1. Patient will remain hemodynamically stable  2:   Hx cva with left sided weakness  3:  Lift dependent  4;  Does not speak much.  Daughter care for him.  5:  Patient will void on his own.     D: Pt admitted with VRE UTI. 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 Sleeping most of night. LVAD numbers worsening overnight (see provider notification). No LVAD alarms. Driveline dressing intact. Multiple voids and BM overnight. Bladder scanned once for 171. Tube feeding started at 10 ml/hr with 30 ml free water Q4H. Medications crushed and down feeding tube. BG checked at HS and 0200. Daughter and Son and bedside and attentive to cares. Turned by family at least Q2H.     P: Possible advancement of feeding tube today.

## 2018-07-13 NOTE — PROGRESS NOTES
Unable to record these doses on calendar:  7/9-3mg  7/10-2mg  7/11-1mg  7/12-1mg  Pt has a nasoduodenal tube, and is getting a continuous infusion of Nepro, which doesn't appear to contain Vit K .

## 2018-07-13 NOTE — PHARMACY-ADMISSION MEDICATION HISTORY
Admission medication history interview status for the 7/13/2018 admission is complete. See Epic admission navigator for allergy information, pharmacy, prior to admission medications and immunization status.     Medication history interview sources:  Patient's son, Patient's daughter, Patient's brother, Discharge Summary (7/12/18)    Changes made to PTA medication list (reason)  Added: None  Deleted:   -loratadine - no longer taking  Changed:   -aspirin 325mg tab --> aspirin 81mg chew    Additional medication history information (including reliability of information, actions taken by pharmacist):  -Patient's brother reports patient's daughter/son manages medications. Patient's daughter/son are moderate historians who verified medication names/strengths/directions. They report patient was recently discharged (7/12/18).   -Patient's daughter/son report all medications are given via feeding tube (either crushed or liquid).  -Patient's daughter/son report patient has warfarin 1mg and 3mg tablets at home as patient's INR frequently fluctuates. Patient's INR is tested by home care nurse that comes every Monday. They are unsure of INR goal. On 7/12/18 patient received 1mg. Patient has not received warfarin prior to admission today.   -Patient's daughter/son report patient was started on a new antibiotic at discharge and patient received one dose this morning. Per discharge summary, this antibiotic is likely linezolid.  -Patient's daughter/son believe tamsulosin was discontinued. However, per discharge summary, patient is supposed to continue on tamsulosin therapy.     Prior to Admission medications    Medication Sig Last Dose Taking? Auth Provider   acetaminophen (TYLENOL) 32 mg/mL solution 20.3 mLs (650 mg) by Oral or Feeding Tube route every 6 hours as needed for mild pain Past Week at Unknown time Yes Maureen Grant NP   ASPIRIN LOW DOSE 81 MG chewable tablet Take 324 mg by mouth daily 7/12/2018 at Unknown time Yes  Unknown, Entered By History   bisacodyl (DULCOLAX) 10 MG Suppository Place 1 suppository (10 mg) rectally daily as needed for constipation Past Month at Unknown time Yes Gabe Peters MD   finasteride (PROSCAR) 5 MG tablet Take 1 tablet (5 mg) by mouth daily Patient needs to be seen in clinic prior to additional refills. 7/13/2018 at AM Yes Lenore Gardner PA   levETIRAcetam (KEPPRA) 100 MG/ML solution Take 7.5 mLs (750 mg) by mouth every 12 hours 7/13/2018 at AM Yes Volodymyr Champion MD   linezolid (ZYVOX) 600 MG tablet Take 1 tablet (600 mg) by mouth every 12 hours for 10 days 7/13/2018 at AM Yes Zuleika Patterson APRN CNP   Melatonin 10 MG TABS tablet Take 1 tablet (10 mg) by mouth At Bedtime 7/12/2018 at Bedtime Yes Raghu Almodovar MD   nitroGLYcerin (NITROSTAT) 0.4 MG sublingual tablet Place 1 tablet (0.4 mg) under the tongue every 5 minutes as needed for chest pain  at Unknown Time Yes Raghu Almodovar MD   ondansetron (ZOFRAN) 4 MG/5ML solution 5 mLs (4 mg) by Per Feeding Tube route 2 times daily as needed for nausea or vomiting Past Month at Unknown time Yes Harinder Tellez MD   oxyCODONE (ROXICODONE) 5 MG/5ML solution Take 5 mLs (5 mg) by mouth every 4 hours as needed for severe pain 7/13/2018 at Unknown time Yes Abilio Bhatt DO   pantoprazole (PROTONIX) SUSP suspension 10 mLs (20 mg) by Oral or Feeding Tube route 2 times daily 7/13/2018 at AM Yes Jesusita Kaiser MD   protein modular (PROSOURCE TF) 1 packet by Per Feeding Tube route 3 times daily 7/13/2018 at AM Yes Jesusita Kaiser MD   senna-docusate (SENOKOT-S;PERICOLACE) 8.6-50 MG per tablet Take 1-2 tablets by mouth 2 times daily as needed for constipation 7/12/2018 at Unknown time Yes Unknown, Entered By History   simethicone (MYLICON) 80 MG chewable tablet Take 80 mg by mouth every 6 hours as needed for flatulence or cramping 7/12/2018 at Unknown time Yes Unknown,  Entered By History   tamsulosin (FLOMAX) 0.4 MG capsule Take 0.4 mg by mouth daily Past Week at Unknown time Yes Unknown, Entered By History   thiamine (VITAMIN B-1) 100 MG tablet Take 1 tablet (100 mg) by mouth daily 7/13/2018 at Unknown time Yes Raghu Almodovar MD   warfarin (COUMADIN) 1 MG tablet Take 1 tablet (1 mg) by mouth daily Or as directed after INR dosing changes 7/12/2018 at Unknown time Yes Zuleika Patterson APRN CNP     Medication history completed by: Yovana Anders, PharmD IV Student

## 2018-07-13 NOTE — ED AVS SNAPSHOT
[facility], Emergency Department    [address] [address]    Phone:  [phone]                                       Sohan Rendon   MRN: [id number]    Department:  [facility], Emergency Department   Date of Visit:  7/13/2018           Patient Information     Date Of Birth          1950        Your diagnoses for this visit were:     Dyspnea, unspecified type        You were seen by Abilio Bhatt DO.        Discharge Instructions       Follow-up with your regular doctor and  cardiologist.  Return to the ER as needed for any other symptoms.  We will prescribe you some pain medication that may be used for any additional trouble breathing or chest pain.    Your next 10 appointments already scheduled     Jul 17, 2018  1:30 PM CDT   Lab with  LAB   [facility])    [address]  1st Floor  [facility] [id number] [phone]            Jul 17, 2018  2:00 PM CDT   (Arrive by 1:45 PM)   Ventricular Assist Device with Maureen Grant NP   [facility])    [address] [place] [address]  [address]  [facility] [address] [phone]            Jul 18, 2018  1:00 PM CDT   Home Follow Up with Raghu Almodovar MD   [facility])    [address] [place] [address] [address]  [facility] [address] [phone]              24 Hour Appointment Hotline       To make an appointment at any [place] clinic, call [phone] ([phone]). If you don't have a family doctor or clinic, we will help you find one. [place] clinics are conveniently located to serve the needs of you and your family.             Review of your medicines      START taking        Dose / Directions Last dose taken    oxyCODONE 5 MG/5ML solution   Commonly known as:  ROXICODONE   Dose:  5 mg   Quantity:  473 mL        Take 5 mLs (5 mg) by mouth every 4 hours as needed for severe pain   Refills:  0           Our records show that you are taking the medicines listed below. If these are incorrect, please call your family doctor or clinic.        Dose / Directions Last dose taken    acetaminophen 32 mg/mL solution   Commonly known as:  TYLENOL   Dose:  650 mg   Quantity:  300 mL        20.3 mLs (650 mg) by Oral or Feeding Tube route every 6 hours as needed for mild pain   Refills:  3        aspirin 325 MG tablet   Dose:  325 mg   Quantity:  120 tablet        Take 1 tablet (325 mg) by mouth daily   Refills:  3        ALEK CONTOUR test strip   Quantity:  100 strip   Generic drug:  blood glucose monitoring        USE TO TEST BLOOD SUGAR 2 TIMES DAILY OR AS DIRECTED.   Refills:  2        bisacodyl 10 MG Suppository   Commonly known as:  DULCOLAX   Dose:  10 mg   Quantity:  5 suppository        Place 1 suppository (10 mg) rectally daily as needed for constipation   Refills:  1        * blood glucose monitoring lancets   Quantity:  1 Box        Use to test blood sugars 2 times daily or as directed.   Refills:  3        * blood glucose monitoring lancets   Quantity:  100 each        USE TO TEST BLOOD SUGAR 2 TIMES DAILY OR AS DIRECTED   Refills:  2        finasteride 5 MG tablet   Commonly known as:  PROSCAR   Dose:  5 mg   Quantity:  30 tablet        Take 1 tablet (5 mg) by mouth daily Patient needs to be seen in clinic prior to additional refills.   Refills:  0        levETIRAcetam 100 MG/ML solution   Commonly known as:  KEPPRA   Dose:  750 mg   Quantity:  500 mL        Take 7.5 mLs (750 mg) by mouth every 12 hours   Refills:  3        linezolid 600 MG tablet   Commonly known as:  ZYVOX   Dose:  600 mg   Indication:  Urinary Tract Infection   Quantity:  20 tablet        Take 1 tablet (600 mg) by mouth every 12 hours for 10 days   Refills:  0        loratadine 5 MG/5ML syrup   Commonly known as:  CLARITIN   Dose:  10 mg   Quantity:  300 mL        10 mLs (10 mg) by Oral or Feeding Tube route daily   Refills:  3         Melatonin 10 MG Tabs tablet   Dose:  10 mg   Quantity:  30 tablet        Take 1 tablet (10 mg) by mouth At Bedtime   Refills:  11        nitroGLYcerin 0.4 MG sublingual tablet   Commonly known as:  NITROSTAT   Dose:  0.4 mg   Quantity:  30 tablet        Place 1 tablet (0.4 mg) under the tongue every 5 minutes as needed for chest pain   Refills:  1        ondansetron 4 MG/5ML solution   Commonly known as:  ZOFRAN   Dose:  4 mg   Quantity:  90 mL        5 mLs (4 mg) by Per Feeding Tube route 2 times daily as needed for nausea or vomiting   Refills:  0        order for DME   Quantity:  1 Device        Equipment being ordered: Lift chair.  Please see indications and face-to-face encounter details in 2/3/15 Office Visit note.   Refills:  0        * order for DME   Quantity:  2 each        Equipment being ordered: Bilateral leg supports for manual wheelchair.   Refills:  0        * order for DME   Quantity:  1 each        Equipment being ordered: Reclining manual w/c with bilateral elevating leg rests.   Refills:  0        * order for DME   Quantity:  1 each        Equipment being ordered: Hospital Bed, fully electric.   Refills:  0        * order for DME   Quantity:  1 each        Equipment being ordered: Wheelchair, manual.   Refills:  0        * order for DME   Quantity:  1 each        Equipment being ordered: Wheelchair, manual, with elevated leg rests and tilt in space back.  Please fax to [facility]; I called them 11/26/16 and they requested the new order.  Face to face is in my 10/26/16 note.   Refills:  0        * order for DME   Quantity:  2 each        Equipment being ordered: Cushioned heel boots.   Refills:  0        * order for DME   Quantity:  2 each        Bilateral heel protective cushioning boots.  Dx: previous stroke with left hemiplegia.  Duration of need: 99 months.   Refills:  0        order for DME   Quantity:  1 each        Equipment being ordered: Nebulizer   Refills:  11        pantoprazole 2 mg/mL  Susp suspension   Commonly known as:  PROTONIX   Dose:  20 mg   Quantity:  400 mL        10 mLs (20 mg) by Oral or Feeding Tube route 2 times daily   Refills:  3        protein modular   Dose:  1 packet   Quantity:  90 packet        1 packet by Per Feeding Tube route 3 times daily   Refills:  3        senna-docusate 8.6-50 MG per tablet   Commonly known as:  SENOKOT-S;PERICOLACE   Dose:  1-2 tablet        Take 1-2 tablets by mouth 2 times daily as needed for constipation   Refills:  0        simethicone 80 MG chewable tablet   Commonly known as:  MYLICON   Dose:  80 mg        Take 80 mg by mouth every 6 hours as needed for flatulence or cramping   Refills:  0        tamsulosin 0.4 MG capsule   Commonly known as:  FLOMAX   Dose:  0.8 mg   Quantity:  60 capsule        Take 2 capsules (0.8 mg) by mouth daily   Refills:  11        thiamine 100 MG tablet   Dose:  100 mg   Quantity:  90 tablet        Take 1 tablet (100 mg) by mouth daily   Refills:  3        warfarin 1 MG tablet   Commonly known as:  COUMADIN   Dose:  1 mg   Quantity:  120 tablet        Take 1 tablet (1 mg) by mouth daily Or as directed after INR dosing changes   Refills:  11        * Notice:  This list has 9 medication(s) that are the same as other medications prescribed for you. Read the directions carefully, and ask your doctor or other care provider to review them with you.            Information about OPIOIDS     PRESCRIPTION OPIOIDS: WHAT YOU NEED TO KNOW   We gave you an opioid (narcotic) pain medicine. It is important to manage your pain, but opioids are not always the best choice. You should first try all the other options your care team gave you. Take this medicine for as short a time (and as few doses) as possible.     These medicines have risks:    DO NOT drive when on new or higher doses of pain medicine. These medicines can affect your alertness and reaction times, and you could be arrested for driving under the influence (DUI). If you need  to use opioids long-term, talk to your care team about driving.    DO NOT operate heave machinery    DO NOT do any other dangerous activities while taking these medicines.     DO NOT drink any alcohol while taking these medicines.      If the opioid prescribed includes acetaminophen, DO NOT take with any other medicines that contain acetaminophen. Read all labels carefully. Look for the word  acetaminophen  or  Tylenol.  Ask your pharmacist if you have questions or are unsure.    You can get addicted to pain medicines, especially if you have a history of addiction (chemical, alcohol or substance dependence). Talk to your care team about ways to reduce this risk.    Store your pills in a secure place, locked if possible. We will not replace any lost or stolen medicine. If you don t finish your medicine, please throw away (dispose) as directed by your pharmacist. The [place] Pollution Control Agency has more information about safe disposal: https://www.pca.formerly [facility].[place].us/living-green/managing-unwanted-medications.     All opioids tend to cause constipation. Drink plenty of water and eat foods that have a lot of fiber, such as fruits, vegetables, prune juice, apple juice and high-fiber cereal. Take a laxative (Miralax, milk of magnesia, Colace, Senna) if you don t move your bowels at least every other day.         Prescriptions were sent or printed at these locations (1 Prescription)                   Other Prescriptions                Printed at Department/Unit printer (1 of 1)         oxyCODONE (ROXICODONE) 5 MG/5ML solution                Procedures and tests performed during your visit     CBC with platelets differential    Comprehensive metabolic panel    EKG 12-lead, tracing only    INR    Partial thromboplastin time    Troponin I    Vascular Access Care Adult IP Consult    XR Chest Port 1 View      Orders Needing Specimen Collection     None      Pending Results     Date and Time Order Name Status Description     "2018 0120 XR Chest Port 1 View Preliminary     2018 0120 EKG 12-lead, tracing only Preliminary             Pending Culture Results     No orders found from 2018 to 2018.            Pending Results Instructions     If you had any lab results that were not finalized at the time of your Discharge, you can call the ED Lab Result RN at [phone]. You will be contacted by this team for any positive Lab results or changes in treatment. The nurses are available 7 days a week from 10A to 6:30P.  You can leave a message 24 hours per day and they will return your call.        Thank you for choosing [facility]       Thank you for choosing [facility] for your care. Our goal is always to provide you with excellent care. Hearing back from our patients is one way we can continue to improve our services. Please take a few minutes to complete the written survey that you may receive in the mail after you visit with us. Thank you!        [facility] Information     [facility] lets you send messages to your doctor, view your test results, renew your prescriptions, schedule appointments and more. To sign up, go to [email or web address] . Click on \"Log in\" on the left side of the screen, which will take you to the Welcome page. Then click on \"Sign up Now\" on the right side of the page.     You will be asked to enter the access code listed below, as well as some personal information. Please follow the directions to create your username and password.     Your access code is: [email or web address]  Expires: 2018  3:38 PM     Your access code will  in 90 days. If you need help or a new code, please call your [facility] clinic or [phone].        Care EveryWhere ID     This is your Care EveryWhere ID. This could be used by other organizations to access your [facility] medical records  [id number]        Equal Access to Services     ALCON MUNOZ: Rocky Rahman, jeana morrison, willie morse " sharla dorman ah. So [facility] [phone].    ATENCIÓN: Si habla español, tiene a smith disposición servicios gratuitos de asistencia lingüística. Llame al [phone].    We comply with applicable federal civil rights laws and [place] laws. We do not discriminate on the basis of race, color, national origin, age, disability, sex, sexual orientation, or gender identity.            After Visit Summary       This is your record. Keep this with you and show to your community pharmacist(s) and doctor(s) at your next visit.

## 2018-07-13 NOTE — MR AVS SNAPSHOT
[facility]   7/13/2018   Anticoagulation Therapy Visit    Description:  68 year old male   Provider:  Dafne Sanders, RN   [facility]           INR as of 7/13/2018     Today's INR No new INR was available at the time of this encounter.      Anticoagulation Summary as of 7/13/2018     INR goal    Prior goal 1.8-2.5   Today's INR No new INR was available at the time of this encounter.   Full warfarin instructions No maintenance plan   Next INR check 7/13/2018    Indications   LVAD (left ventricular assist device) present [Z95.811]  Long-term (current) use of anticoagulants [Z79.01] [Z79.01]         July 2018 Details    Sun Mon Tue Wed Thu Fri Sat     1               2               3               4               5               6               7                 8               9               10               11               12               13      See details      14                 15               16               17               18               19               20               21                 22               23               24               25               26               27               28                 29               30               31                    Date Details   07/13 This INR check       Date of next INR:  7/13/2018

## 2018-07-13 NOTE — PROGRESS NOTES
Discharge Note:  Patient resting better today, and appears more alert when awake. Nepro TF increased from 10 cc/hr to 40 cc/hr, then to goal 50 cc/hour with H2O flushes 30 cc Q 4 hrs, eating minimally, daughter fed patient Boost, seen by Speech Therapy, sent to Xray, Left Nare FT repositioned and bridled. All medications administered down FT. No urge to void, bladder scan 437 cc, soon after scan incontinent large amount urine, also passed good BM. Cr 2.96, recheck 2.99, Diane DC'ed. INR 3.13 today, DC order is Coumadin 1 mg tonight with INR check tomorrow by Home care. Monitor 's. LVAD #s continue with flows +++, PI's 1.8 to 2.2, Power 6's-to 10's, Cards 2 and LVAD Coordinator Vivi aware. Refer to flow sheets for full assessments, VS, labs, FSG etc. OK'ed to DC home, refer to DC orders.  DISCHARGE   Discharged to: Home  Via: [facility]  Accompanied by: daughter & two [facility] transporters.  Discharge Instructions: diet, activity, medications, follow up appointments, when to call the MD, and what to watchout for (i.e. s/s of infection, increasing SOB, palpitations, chest pain,) Cares, precautions etc for HF, LVAD,Coumadin/INR, UTI's etc.   Prescriptions: To be filled by [facility] pharmacy per pt's request; medication list reviewed & sent with pt  Follow Up Appointments: arranged; information given  Belongings: All sent with pt  IV: out  Telemetry: off  Pt exhibits understanding of above discharge instructions; all questions answered.  Discharge Paperwork: faxed

## 2018-07-13 NOTE — DISCHARGE INSTRUCTIONS
Follow-up with your regular doctor and  cardiologist.  Return to the ER as needed for any other symptoms.  We will prescribe you some pain medication that may be used for any additional trouble breathing or chest pain.

## 2018-07-13 NOTE — ED TRIAGE NOTES
BIBA from home for shortness of breath. Pt sats low 80s on RA, mid-high 90s on 11 L mask. Recent visits to ED for UTI, discharged this morning. Pt less responsive today, increased shortness of breath, hypotensive upon arrival, unable to obtain O2 monitoring upon arrival. MD at bedside to assess patient.

## 2018-07-13 NOTE — ED PROVIDER NOTES
[place] EMERGENCY DEPARTMENT ([facility])  7/13/18   ED 4    History     Chief Complaint   Patient presents with     Altered Mental Status     Shortness of Breath     The history is provided by medical records and a relative (Daughter).     Sohan Rendon is a 68 year old male who presents via EMS for evaluation of brief episode of tachypnea.  He has a complex past medical history including chronic congestive heart failure on destination therapy LVAD since 2012, complicated by LVAD thrombus and recurrent ischemic CVAs with subsequent dysphagia, aphasia on feeding tube. History presented by daughter and EMS, EMS report that they are very familiar with patient and are deployed to their residence at least twice a week as family has a low threshold for calling. Daughter states that patient was at his usual state of health earlier today, and she has started a tube feed for him at 10 PM.  He did have a normal void and stool today.  She notes that after initiating tube feed he did have a period of panting and breathing fast for 2 minutes. Family concerned because his cough looked like formula, and are concerned that he may have aspirated some.  No vomiting.  Daughter states that at this time he appears back to his baseline state of wellness.      I have reviewed the Medications, Allergies, Past Medical and Surgical History, and Social History in the [employer] system.  Past Medical History:   Diagnosis Date     Acute right MCA stroke (H) 6/2013    With L sided hemiparesis      Anemia of chronic disease     Baseline Hb 8-9     BPH (benign prostatic hyperplasia)      CHF (congestive heart failure), NYHA class IV (H) 10/9/2012    s/p HeartMate II.  Was on milrinone and dobutamine prior to LVAD      CKD (chronic kidney disease)     Baseline Cr=     Clostridium difficile colitis 12/2012      Dysphagia     PEG tube placed in 2012.  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  Normal carotid duplex 3/2014.       Esophagitis 12/2012    EGD with esophagitis and multiple douenal ulcers     Fracture of femoral neck, right, closed (H) 2/3/2015    Presumed pathologic as patient is non-weight-bearing and suffered no trauma.  Family declined operative repair during hospital stay 1/23 - 1/30/15.     Gastric and duodenal angiodysplasia with hemorrhage 6/18/2015     GERD (gastroesophageal reflux disease)      Gout      Hematuria      HTN (hypertension)     LDL=59 (3/29/2014)     Hyperlipaemia      Ischemic cardiomyopathy      Mitral regurgitation     s/p MVR with Carbomedics ring      Myocardial infarction 1998    In [facility] without intervention      Nonsenile cataract     BE     PFO (patent foramen ovale)     s/p closure (10/9/2012)     TB lung, latent     s/p 9 months INH in 2012        Past Surgical History:   Procedure Laterality Date     C CABG, VEIN, FIVE  2001     CATARACT IOL, RT/LT Right 11/19/2015     ENDOSCOPIC RETROGRADE CHOLANGIOPANCREATOGRAM N/A 5/9/2017    Procedure: COMBINED ENDOSCOPIC RETROGRADE CHOLANGIOPANCREATOGRAPHY, PLACE TUBE/STENT;  Endoscopic Retrograde Cholangiopancreatogram, Stent (Zimmon Biliary 7fr 12cm) Placement;  Surgeon: Cristo Grove MD;  Location: [facility] OR     ESOPHAGOSCOPY, GASTROSCOPY, DUODENOSCOPY (EGD), COMBINED N/A 6/10/2015    Procedure: COMBINED ESOPHAGOSCOPY, GASTROSCOPY, DUODENOSCOPY (EGD);  Surgeon: Edu Jenkins MD;  Location: [facility] GI     ESOPHAGOSCOPY, GASTROSCOPY, DUODENOSCOPY (EGD), COMBINED N/A 6/15/2015    Procedure: COMBINED ESOPHAGOSCOPY, GASTROSCOPY, DUODENOSCOPY (EGD);  Surgeon: Yury Bonner MD;  Location: [facility] OR     H INSERT ICD  2/10/2011    And pacemaker.  Not BiV     INSERT VENTRICULAR ASSIST DEVICE LEFT (HEARTMATE II)  10/9/2012     IR GASTRO JEJUNOSTOMY TUBE PLACEMENT       PHACOEMULSIFICATION CLEAR CORNEA WITH STANDARD INTRAOCULAR LENS IMPLANT Right 11/19/2015    Procedure:  "PHACOEMULSIFICATION CLEAR CORNEA WITH STANDARD INTRAOCULAR LENS IMPLANT;  Surgeon: Violeta Cosme MD;  Location: [facility] OR     REPAIR PATENT FORAMEN OVALE  10/9/2012     REPAIR VALVE MITRAL  2/9/2012    28 mm Carbomedics 2/3 ring        Family History   Problem Relation Age of Onset     Family History Negative No family hx of      Glaucoma No family hx of      Macular Degeneration No family hx of      Cancer No family hx of      no skin cancer       Social History   Substance Use Topics     Smoking status: Former Smoker     Smokeless tobacco: Former User     Alcohol use No      Review of Systems   Unable to perform ROS: Patient nonverbal   Respiratory: Positive for shortness of breath.    Cardiovascular: Negative for chest pain.   Musculoskeletal: Negative for back pain.   Neurological: Negative for dizziness.       Physical Exam   BP: (!) 71/38  Pulse:  (LVAD)  Temp: 97.2  F (36.2  C)  Resp: 24  Height: 172.7 cm (5' 8\")  Weight: 90.7 kg (200 lb)  SpO2: 96 %      Physical Exam   Constitutional:   Ill-appearing and cachectic   HENT:   Head: Normocephalic and atraumatic.   Eyes: EOM are normal.   Neck: Neck supple.   Cardiovascular:   Characteristic LVAD machinery sound   Pulmonary/Chest: Effort normal and breath sounds normal. No respiratory distress. He has no wheezes.   Abdominal: He exhibits distension. There is no tenderness.   Musculoskeletal: Normal range of motion.   Neurological: He is alert.   Patient not speaking, does not follow any commands, at baseline per family   Skin: Skin is dry.   Psychiatric:   Flat affect       ED Course     ED Course     Procedures             EKG Interpretation:      Interpreted by Abilio Bhatt  Time reviewed: 245  Symptoms at time of EKG: Dyspnea  Rhythm: normal sinus   Rate: normal 104  Axis: normal  Ectopy: none   Conduction: Left anterior fascicular block  ST Segments/ T Waves: No ST-T elevation, abnormal T-wave possible lateral ischemia Comparison to prior: " Unchanged    Clinical Impression: Abnormal EKG unchanged from prior           Labs Ordered and Resulted from Time of ED Arrival Up to the Time of Departure from the ED   CBC WITH PLATELETS DIFFERENTIAL - Abnormal; Notable for the following:        Result Value    RBC Count 2.74 (*)     Hemoglobin 7.4 (*)     Hematocrit 24.6 (*)     MCHC 30.1 (*)     RDW 21.7 (*)     All other components within normal limits   COMPREHENSIVE METABOLIC PANEL - Abnormal; Notable for the following:     Sodium 132 (*)     Carbon Dioxide 18 (*)     Glucose 171 (*)     Urea Nitrogen 49 (*)     Creatinine 3.31 (*)     GFR Estimate 19 (*)     GFR Estimate If Black 23 (*)     Calcium 7.6 (*)     Albumin 3.0 (*)     AST 83 (*)     All other components within normal limits   TROPONIN I - Abnormal; Notable for the following:     Troponin I ES 0.468 (*)     All other components within normal limits   INR - Abnormal; Notable for the following:     INR 2.27 (*)     All other components within normal limits   PARTIAL THROMBOPLASTIN TIME - Abnormal; Notable for the following:     PTT 43 (*)     All other components within normal limits       Consults  Cardiology: Responded (07/13/18 8871)   Results for orders placed or performed during the hospital encounter of 07/13/18   XR Chest Port 1 View    Narrative    1. Stable cardiomegaly, pulmonary edema, and small pleural effusions.  2. Stable support devices.   CBC with platelets differential   Result Value Ref Range    WBC 8.4 4.0 - 11.0 10e9/L    RBC Count 2.74 (L) 4.4 - 5.9 10e12/L    Hemoglobin 7.4 (L) 13.3 - 17.7 g/dL    Hematocrit 24.6 (L) 40.0 - 53.0 %    MCV 90 78 - 100 fl    MCH 27.0 26.5 - 33.0 pg    MCHC 30.1 (L) 31.5 - 36.5 g/dL    RDW 21.7 (H) 10.0 - 15.0 %    Platelet Count 228 150 - 450 10e9/L    Diff Method Automated Method     % Neutrophils 68.1 %    % Lymphocytes 14.5 %    % Monocytes 9.3 %    % Eosinophils 7.1 %    % Basophils 0.6 %    % Immature Granulocytes 0.4 %    Nucleated RBCs 0 0  /100    Absolute Neutrophil 5.7 1.6 - 8.3 10e9/L    Absolute Lymphocytes 1.2 0.8 - 5.3 10e9/L    Absolute Monocytes 0.8 0.0 - 1.3 10e9/L    Absolute Eosinophils 0.6 0.0 - 0.7 10e9/L    Absolute Basophils 0.1 0.0 - 0.2 10e9/L    Abs Immature Granulocytes 0.0 0 - 0.4 10e9/L    Absolute Nucleated RBC 0.0    Comprehensive metabolic panel   Result Value Ref Range    Sodium 132 (L) 133 - 144 mmol/L    Potassium 3.9 3.4 - 5.3 mmol/L    Chloride 101 94 - 109 mmol/L    Carbon Dioxide 18 (L) 20 - 32 mmol/L    Anion Gap 12 3 - 14 mmol/L    Glucose 171 (H) 70 - 99 mg/dL    Urea Nitrogen 49 (H) 7 - 30 mg/dL    Creatinine 3.31 (H) 0.66 - 1.25 mg/dL    GFR Estimate 19 (L) >60 mL/min/1.7m2    GFR Estimate If Black 23 (L) >60 mL/min/1.7m2    Calcium 7.6 (L) 8.5 - 10.1 mg/dL    Bilirubin Total 0.7 0.2 - 1.3 mg/dL    Albumin 3.0 (L) 3.4 - 5.0 g/dL    Protein Total 7.0 6.8 - 8.8 g/dL    Alkaline Phosphatase 87 40 - 150 U/L    ALT 22 0 - 70 U/L    AST 83 (H) 0 - 45 U/L   Troponin I   Result Value Ref Range    Troponin I ES 0.468 (HH) 0.000 - 0.045 ug/L   INR   Result Value Ref Range    INR 2.27 (H) 0.86 - 1.14   Partial thromboplastin time   Result Value Ref Range    PTT 43 (H) 22 - 37 sec   EKG 12-lead, tracing only   Result Value Ref Range    Interpretation ECG Click View Image link to view waveform and result      *Note: Due to a large number of results and/or encounters for the requested time period, some results have not been displayed. A complete set of results can be found in Results Review.         Assessments & Plan (with Medical Decision Making)   68-year-old male with a complex medical history including LVAD, multiple CVAs, aphasia presents with a chief complaint per his family member of hyperventilating and difficulty breathing.  This lasted approximately 2-5 minutes and resolve spontaneously.  Evaluation here is significant for elevated troponin 0.468.  Discussed with on-call cardiologist who  previously had a conversation  with the family yesterday about goals of care.  The family was in agreement with minimal intervention given his worsening mental status and advanced disease.  Cardiologist recommended I prescribed him oxycodone solution for any further pain he experiences.  Given current goals of care and his medical status she did not recommend further evaluation.  The low elevation in troponin of 0.468 is not unexpected given the patient's current cardiac condition.  They would not under any circumstances proceed with further cardiac intervention given his status.  She also stated they would be happy to admit him for pain control that was needed.  Patient has not had any further pain is been sleeping comfortable in the room.  Discussed this with the family member who was present.  They are comfortable with discharge home and are grateful for the prescription for additional pain relief.    I have reviewed the nursing notes.    I have reviewed the findings, diagnosis, plan and need for follow up with the patient.    Discharge Medication List as of 7/13/2018  5:25 AM      START taking these medications    Details   oxyCODONE (ROXICODONE) 5 MG/5ML solution Take 5 mLs (5 mg) by mouth every 4 hours as needed for severe pain, Disp-473 mL, R-0, Local Print             Final diagnoses:   Dyspnea, unspecified type   I, Sadaf Richard, am serving as a trained medical scribe to document services personally performed by Abilio Bhatt MD based on the provider's statements to me on July 13, 2018.  This document has been checked and approved by the attending provider.    I, Abilio Bhatt MD, was physically present and have reviewed and verified the accuracy of this note documented by Sadaf Richard, medical scribe.       7/13/2018   [facility], [place], EMERGENCY DEPARTMENT     Abilio Bhatt,   07/13/18 0630

## 2018-07-13 NOTE — ED AVS SNAPSHOT
[facility], [place], Emergency Department    [address] [address]    Phone:  [phone]                                       Sohan Rendon   MRN: [id number]    Department:  [facility], Emergency Department   Date of Visit:  7/13/2018           After Visit Summary Signature Page     I have received my discharge instructions, and my questions have been answered. I have discussed any challenges I see with this plan with the nurse or doctor.    ..........................................................................................................................................  Patient/Patient Representative Signature      ..........................................................................................................................................  Patient Representative Print Name and Relationship to Patient    ..................................................               ................................................  Date                                            Time    ..........................................................................................................................................  Reviewed by Signature/Title    ...................................................              ..............................................  Date                                                            Time

## 2018-07-13 NOTE — ED NOTES
[facility], [place]   ED Nurse to Floor Handoff     Sohan Rendon is a 68 year old male who speaks Swazi and lives with family members,  in a home  They arrived in the ED by ambulance from home    ED Chief Complaint: Shortness of Breath    ED Dx;   Final diagnoses:   Acute and chronic respiratory failure with hypoxia (H)         Needed?: Yes    Allergies:   Allergies   Allergen Reactions     Seasonal Allergies    .  Past Medical Hx:   Past Medical History:   Diagnosis Date     Acute right MCA stroke (H) 6/2013    With L sided hemiparesis      Anemia of chronic disease     Baseline Hb 8-9     BPH (benign prostatic hyperplasia)      CHF (congestive heart failure), NYHA class IV (H) 10/9/2012    s/p HeartMate II.  Was on milrinone and dobutamine prior to LVAD      CKD (chronic kidney disease)     Baseline Cr=     Clostridium difficile colitis 12/2012      Dysphagia     PEG tube placed in 2012.  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  Normal carotid duplex 3/2014.       Esophagitis 12/2012    EGD with esophagitis and multiple douenal ulcers     Fracture of femoral neck, right, closed (H) 2/3/2015    Presumed pathologic as patient is non-weight-bearing and suffered no trauma.  Family declined operative repair during hospital stay 1/23 - 1/30/15.     Gastric and duodenal angiodysplasia with hemorrhage 6/18/2015     GERD (gastroesophageal reflux disease)      Gout      Hematuria      HTN (hypertension)     LDL=59 (3/29/2014)     Hyperlipaemia      Ischemic cardiomyopathy      Mitral regurgitation     s/p MVR with Carbomedics ring      Myocardial infarction 1998    In [facility] without intervention      Nonsenile cataract     BE     PFO (patent foramen ovale)     s/p closure (10/9/2012)     TB lung, latent     s/p 9 months INH in 2012       Baseline Mental status: WDL  Current Mental  Status changes: other obtunded/lethargic    Infection present or suspected this encounter: yes respiratory  Sepsis suspected: No  Isolation type: Contact     Activity level - Baseline/Home:  Total Care  Activity Level - Current:   Total Care    Bariatric equipment needed?: No    In the ED these meds were given:   Medications   piperacillin-tazobactam (ZOSYN) 3.375 g vial to attach to  mL bag (not administered)   vancomycin (VANCOCIN) 2,250 mg in sodium chloride 0.9 % 500 mL intermittent infusion (not administered)   vancomycin place morris - receiving intermittent dosing (not administered)       Drips running?  No    Home pump  No    Current LDAs  Peripheral IV 07/13/18 Right;Anterior Lower forearm (Active)   Number of days:0       NG/OG Tube Left nostril (Active)   Number of days:2       Wound 07/10/18 Mid Coccyx Skin tear about 3 inch linear (Active)   Number of days:3       Labs results:   Labs Ordered and Resulted from Time of ED Arrival Up to the Time of Departure from the ED   CBC WITH PLATELETS DIFFERENTIAL   BASIC METABOLIC PANEL   INR   PERIPHERAL IV CATHETER   ISTAT CG4 GASES LACTATE BEATA NURSING POCT   BLOOD CULTURE   BLOOD CULTURE       Imaging Studies:   Recent Results (from the past 24 hour(s))   XR Chest Port 1 View    Narrative    Exam: XR CHEST PORT 1 VW, 7/13/2018 1:51 AM    Indication: Dyspnea    Comparison: 7/10/2018, 7/8/2018, 7/5/2018    Findings:   A single portable AP view the chest was obtained. The enteric tube  passes below the field-of-view. Grossly stable position of the left  chest pacemaker/implantable cardiac defibrillator and leads as well as  the left ventricular assist device which is partially imaged.  Prosthetic mitral valve. The cardiac silhouette remains enlarged. No  pneumothorax. Stable small pleural effusions, left greater than right.  Unchanged perihilar and left basilar opacities. The upper abdomen  appears normal.      Impression    Impression:   1. Stable  cardiomegaly, pulmonary edema, and small pleural effusions.  2. Stable support devices.    I have personally reviewed the examination and initial interpretation  and I agree with the findings.    ISRAEL PEOPLES MD   XR Chest Port 1 View    Narrative    EXAM: XR CHEST PORT 1 VW  7/13/2018 2:46 PM     HISTORY: 68-year-old man with history of CHF, LVAD complicated  valvular thrombosis and recurrent CVA presents with respiratory  distress and AMS.    COMPARISON:  6/7/2018,  7/13/2018.      TECHNIQUE: Single frontal radiograph of the chest.    FINDINGS:   Enteric tube (outside the course of view. Position of ICD with leads  unchanged. The trachea is midline and patent. Stable enlargement of  the cardiac silhouette.     The osseous thorax, visualized abdomen and soft tissues are  unremarkable.     Left retrocardiac opacities are slightly increased. Bilateral  perihilar opacities are unchanged as is small right pleural effusion.       Impression    IMPRESSION:   1. Retrocardiac opacification is slightly increased, probably  representing atelectasis although infection or aspiration are  possibilities. Perihilar opacities otherwise unchanged.    I have personally reviewed the examination and initial interpretation  and I agree with the findings.    MOUNA TERESA MD       Recent vital signs:   BP 94/70  Resp 30    Cardiac Rhythm: Normal Sinus and Tachycardia  Pt needs tele? Yes  Skin/wound Issues: None    Code Status: DNR / DNI    Pain control: good    Nausea control: good    Abnormal labs/tests/findings requiring intervention: Having difficulty obtaining SPo2 multiple avenues attempted Dr. Taylor aware.    Family present during ED course? Yes   Family Comments/Social Situation comments: Patient has history of CVA w/ L sided hemiparesis. Patient has LVAD (heartmate). ED physician had discussion with family and code status changed to DNR/DNI     Tasks needing completion: None    RAY BRAN, SHAWN  asc--   [phone]  [place] [facility]  [phone] [facility] ED

## 2018-07-13 NOTE — PHARMACY-VANCOMYCIN DOSING SERVICE
Pharmacy Vancomycin Initial Note  Date of Service 2018  Patient's  1950  68 year old, male    Indication: Sepsis    Current estimated CrCl = Estimated Creatinine Clearance: 23.7 mL/min (based on Cr of 3.31).    Creatinine for last 3 days  2018:  6:03 AM Creatinine 2.72 mg/dL  2018:  6:32 AM Creatinine 2.96 mg/dL; 12:56 PM Creatinine 2.99 mg/dL  2018:  3:31 AM Creatinine 3.31 mg/dL    Recent Vancomycin Level(s) for last 3 days  No results found for requested labs within last 72 hours.      Vancomycin IV Administrations (past 72 hours)      No vancomycin orders with administrations in past 72 hours.                Nephrotoxins and other renal medications (Future)    Start     Dose/Rate Route Frequency Ordered Stop    18 1556  vancomycin place morris - receiving intermittent dosing      1 each Does not apply SEE ADMIN INSTRUCTIONS 18 1557      18 1556  vancomycin (VANCOCIN) 2,250 mg in sodium chloride 0.9 % 500 mL intermittent infusion      2,250 mg  over 2 Hours Intravenous ONCE 18 1556      18 1552  piperacillin-tazobactam (ZOSYN) 3.375 g vial to attach to  mL bag      3.375 g  over 1 Hours Intravenous ONCE 18 1551            Contrast Orders - past 72 hours     None                Plan:  1.  Start vancomycin  2250 mg (25mg/kg Actual Weight) IV LOAD ONCE.   2.  Goal Trough Level: 15-20 mg/L   3.  Pharmacy will check trough levels as appropriate in 1-3 Days.    4. Serum creatinine levels will be ordered daily for the first week of therapy and at least twice weekly for subsequent weeks.    5. Stephenson method utilized to dose vancomycin therapy: Method 2    Chadd Jones, PharmD

## 2018-07-13 NOTE — ED PROVIDER NOTES
History     Chief Complaint   Patient presents with     Shortness of Breath     The history is provided by a relative. The history is limited by the condition of the patient (baseline aphasia +acute altered mental status).     Sohan Rendon is a 68 year old male with a history of chronic congestive heart failure on destination therapy LVAD since 2012, complicated by LVAD thrombus and recurrent ischemic CVAs (most recently with MCA stroke on 6/6/18) with resultant dysphagia and aphasia, with feeding tube in place, who presents to the emergency department today for evaluation of respiratory distress and altered mental status.      Of note, the patient had hospitalization 6/6-6/22 for MCA stroke.  He was discharged to home with home health care.  He was re-hospitalized on 7/8 and discharged yesterday for management of VRE UTI.    The patient is brought in by his daughter today, who provides a history secondary to the patient's current condition.  The patient has a history of multiple previous CVAs with last about 2 months ago, and he has resultant aphasia at baseline.  Daughter indicates that the patient has been speaking to family since his recent stroke but this has been rather limited.  Today, approximately 1 hour prior to arrival, the patient was found by family to be with acutely decreased responsiveness.  He was found with significantly increased work of breathing, and apparent respiratory distress.  They also found him diaphoretic, having soaked his bed sheets, and he was also seen to be drooling.  Family noted that the patient's hands and feet were very cold as well.  Daughter notes that the patient does have baseline bilateral ankle edema, which is mild today.  Additionally, daughter indicates that the patient has not urinated since yesterday, this morning home health nurse did catheterize the patient with passage of small amount of urine.  The patient is anticoagulated on Coumadin.      Current  Facility-Administered Medications   Medication     aspirin tablet 325 mg     bisacodyl (DULCOLAX) Suppository 10 mg     finasteride (PROSCAR) tablet 5 mg     levETIRAcetam (KEPPRA) solution 750 mg     levofloxacin (LEVAQUIN) infusion 500 mg     linezolid (ZYVOX) tablet 600 mg     naloxone (NARCAN) injection 0.1-0.4 mg     pantoprazole (PROTONIX) 2 mg/mL suspension 20 mg     Patient is already receiving anticoagulation with heparin, enoxaparin (LOVENOX), warfarin (COUMADIN)  or other anticoagulant medication     piperacillin-tazobactam (ZOSYN) 3.375 g vial to attach to  mL bag     piperacillin-tazobactam (ZOSYN) 3.375 g vial to attach to  mL bag     simethicone (MYLICON) chewable tablet 80 mg     tamsulosin (FLOMAX) capsule 0.8 mg     vancomycin (VANCOCIN) 2,250 mg in sodium chloride 0.9 % 500 mL intermittent infusion     vancomycin place morris - receiving intermittent dosing     Warfarin Therapy Reminder (Check START DATE - warfarin may be starting in the FUTURE)     Past Medical History:   Diagnosis Date     Acute right MCA stroke (H) 6/2013    With L sided hemiparesis      Anemia of chronic disease     Baseline Hb 8-9     BPH (benign prostatic hyperplasia)      CHF (congestive heart failure), NYHA class IV (H) 10/9/2012    s/p HeartMate II.  Was on milrinone and dobutamine prior to LVAD      CKD (chronic kidney disease)     Baseline Cr=     Clostridium difficile colitis 12/2012      Dysphagia     PEG tube placed in 2012.  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  Normal carotid duplex 3/2014.       Esophagitis 12/2012    EGD with esophagitis and multiple douenal ulcers     Fracture of femoral neck, right, closed (H) 2/3/2015    Presumed pathologic as patient is non-weight-bearing and suffered no trauma.  Family declined operative repair during hospital stay 1/23 - 1/30/15.     Gastric and duodenal  angiodysplasia with hemorrhage 6/18/2015     GERD (gastroesophageal reflux disease)      Gout      Hematuria      HTN (hypertension)     LDL=59 (3/29/2014)     Hyperlipaemia      Ischemic cardiomyopathy      Mitral regurgitation     s/p MVR with Carbomedics ring      Myocardial infarction 1998    In [facility] without intervention      Nonsenile cataract     BE     PFO (patent foramen ovale)     s/p closure (10/9/2012)     TB lung, latent     s/p 9 months INH in 2012        Past Surgical History:   Procedure Laterality Date     C CABG, VEIN, FIVE  2001     CATARACT IOL, RT/LT Right 11/19/2015     ENDOSCOPIC RETROGRADE CHOLANGIOPANCREATOGRAM N/A 5/9/2017    Procedure: COMBINED ENDOSCOPIC RETROGRADE CHOLANGIOPANCREATOGRAPHY, PLACE TUBE/STENT;  Endoscopic Retrograde Cholangiopancreatogram, Stent (Zimmon Biliary 7fr 12cm) Placement;  Surgeon: Cristo Grove MD;  Location: [facility] OR     ESOPHAGOSCOPY, GASTROSCOPY, DUODENOSCOPY (EGD), COMBINED N/A 6/10/2015    Procedure: COMBINED ESOPHAGOSCOPY, GASTROSCOPY, DUODENOSCOPY (EGD);  Surgeon: Edu Jenkins MD;  Location: [facility] GI     ESOPHAGOSCOPY, GASTROSCOPY, DUODENOSCOPY (EGD), COMBINED N/A 6/15/2015    Procedure: COMBINED ESOPHAGOSCOPY, GASTROSCOPY, DUODENOSCOPY (EGD);  Surgeon: Yury Bonner MD;  Location: [facility] OR     H INSERT ICD  2/10/2011    And pacemaker.  Not BiV     INSERT VENTRICULAR ASSIST DEVICE LEFT (HEARTMATE II)  10/9/2012     IR GASTRO JEJUNOSTOMY TUBE PLACEMENT       PHACOEMULSIFICATION CLEAR CORNEA WITH STANDARD INTRAOCULAR LENS IMPLANT Right 11/19/2015    Procedure: PHACOEMULSIFICATION CLEAR CORNEA WITH STANDARD INTRAOCULAR LENS IMPLANT;  Surgeon: Violeta Cosme MD;  Location: [facility] OR     REPAIR PATENT FORAMEN OVALE  10/9/2012     REPAIR VALVE MITRAL  2/9/2012    28 mm Carbomedics 2/3 ring        Family History   Problem Relation Age of Onset     Family History Negative No family hx of      Glaucoma No family hx of      Macular Degeneration No family  hx of      Cancer No family hx of      no skin cancer       Social History   Substance Use Topics     Smoking status: Former Smoker     Smokeless tobacco: Former User     Alcohol use No     Allergies   Allergen Reactions     Seasonal Allergies        I have reviewed the Medications, Allergies, Past Medical and Surgical History, and Social History in the Epic system.    Review of Systems   Unable to perform ROS: Mental status change       Physical Exam   BP: (!) 61/33  Heart Rate: 107  Temp: (!) 90  F (32.2  C)  Resp: 30  SpO2:  (unable to obtain)      Physical Exam   Constitutional: No distress.   HENT:   Head: Atraumatic.   Mouth/Throat: Oropharynx is clear and moist. No oropharyngeal exudate.   Eyes: Pupils are equal, round, and reactive to light. No scleral icterus.   Neck: Neck supple.   Cardiovascular: Intact distal pulses.    Mechanical LVAD sounds   Pulmonary/Chest: Breath sounds normal. Tachypnea noted. He is in respiratory distress. He has no decreased breath sounds. He has no wheezes. He has no rales.   Abdominal: Soft. Bowel sounds are normal. He exhibits distension (Mild, no apparent tenderness). There is no tenderness.   Musculoskeletal: He exhibits edema (Mild bilateral pedal edema). He exhibits no tenderness.   Skin: Skin is warm. No rash noted. He is not diaphoretic.       ED Course     ED Course     Procedures             EKG Interpretation:      Interpreted by Frank Taylor  Time reviewed: 1410  Symptoms at time of EKG: Shortness of breath  Rhythm: Sinus tachycardia  Rate: 109  Axis: normal  Ectopy: none  Conduction: First-degree AV block  ST Segments/ T Waves: No ST-T wave changes  Q Waves: none  Comparison to prior: Unchanged    Clinical Impression: Sinus tachycardia          Critical Care time:  was 45 minutes for this patient excluding procedures.     The Lactic acid level is elevated due to Cardiogenic shock, at this time there is no sign of severe sepsis or septic shock.       Labs  Ordered and Resulted from Time of ED Arrival Up to the Time of Departure from the ED   BASIC METABOLIC PANEL - Abnormal; Notable for the following:        Result Value    Sodium 129 (*)     Carbon Dioxide 9 (*)     Anion Gap 21 (*)     Glucose 434 (*)     Urea Nitrogen 59 (*)     Creatinine 3.72 (*)     GFR Estimate 16 (*)     GFR Estimate If Black 20 (*)     Calcium 7.9 (*)     All other components within normal limits   LACTIC ACID WHOLE BLOOD - Abnormal; Notable for the following:     Lactic Acid 9.5 (*)     All other components within normal limits   PERIPHERAL IV CATHETER   ISTAT CG4 GASES LACTATE BEATA NURSING POCT   BLOOD CULTURE   BLOOD CULTURE   BLOOD CULTURE       Consults  Cardiology: Responded (cards 2 attending) (07/13/18 1420)    Assessments & Plan (with Medical Decision Making)   The patient arrived with severe respiratory distress was somewhat obtunded.  He was placed on BiPAP and as it was challenging to get an oxygen saturation to be with his diaphoresis.  The patient was recently admitted to the hospital and is currently being treated for a UTI with the nasal lid.  He has an LVAD and his flow numbers were near baseline.  After reviewing his chart and seeing that his care team and family have agreed that he should be DNR/DNI, avoiding heroic measures I spoke with the family and confirmed this status.  The patient has an elevated lactate, likely more consistent with his poor cardiac output.  His family understands that his condition is deteriorated but after speaking with Dr. Lemons who knows the patient well and came and saw the patient we will admit him on BiPAP tonight, covering him with antibiotics for possible sepsis and see how he responds over the next 24 hours.  At this point he appears euvolemic.    I have reviewed the nursing notes.    I have reviewed the findings, diagnosis, plan and need for follow up with the patient.    Current Discharge Medication List          Final diagnoses:   Acute  and chronic respiratory failure with hypoxia (H)     I, Raul Mcwilliams, am serving as a trained medical scribe to document services personally performed by Frank Taylor MD, based on the provider's statements to me.      I, Frank Taylor MD, was physically present and have reviewed and verified the accuracy of this note documented by Raul Mcwilliams.    7/13/2018   [facility], [place], EMERGENCY DEPARTMENT     Frank Taylor MD  07/13/18 1383

## 2018-07-13 NOTE — ED NOTES
Pt bladder scanned per daughter's request. There was 357ml of urine in his bladder.  Stated pt may be straight cathed prior to discharge.

## 2018-07-13 NOTE — ED TRIAGE NOTES
Pt was BIBA after having a labored breathing episode witnessed by his daughter. She says he suddenly started having rapid breathing and wasn't responding to her as usual. Pt is an LVAD pt and was just discharged from the hospital today after being treated for a UTI. He currently has a nasogastric feeding tube and his daughter started his tube feeding at 2200 tonight. She states when he was coughing tonight his phlegm appeared white like the tube feeding. Upon arrival pt is drowsy, calm, but not wanting to cooperate with vitals. No respiratory distress or coughing at this time.

## 2018-07-13 NOTE — H&P
[facility]   Cardiology II Service / Advanced Heart Failure  History & Physical    Sohan Rendon  : 1950  MRN # [id number]    ADMIT DATE: 2018    PCP: Shilpi Agosto    CHIEF COMPLAINT: Shortness of breath.     HPI: Mr. Rendon is a 68 year old male with a past medical history including SCHF secondary to ICM s/p LVAD HM II in  as DT, multiple ischemic CVAs with residual left sided weakness, latent TB, HTN, Hyperlipidemia, PFO s/p closure in , CKD Stage III, BPH, GERD, Gout, Anemia of CD, s/p ICD , s/p MVR by carbomedics ring. 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 He presents from home with worsening shortness of breath. His daughter noted that this morning his breathing continued to get worse with productive cough. She also notes diaphoresis this AM. Following changes in breathing they administered Oxycodone. She states he has remained alert and somewhat combative throughout the day even with the administration of Oxycodone.     Upon presentation to ED his oxygen saturations dropped to 50% requiring Bipap. Labs remain pending. Chest X-ray relatively unchanged from prior. He will present to floor for Bipap support for 24 hours with plan to reassess acuity and goals at that time.     ROS: Per daughters and limited given patient is unresponsive.   CONSTITUTIONAL: Denies fever, chills, fatigue, or weight fluctuations. Notes diaphoresis.   CV:Denies chest pain and palpitations.   PULMONARY:Denies shortness of breath, cough, or previous TB exposure.   GI:Denies nausea, vomiting, diarrhea, and abdominal pain. Bowel movements yesterday. She notes mild increased abdominal distention.    :Denies urinary alterations, dysuria, urinary frequency, hematuria, and abnormal drainage.   EXT:Denies lower  extremity edema.   SKIN:Denies abnormal rashes or lesions.   NEUROLOGIC:Denies changes from baseline.      PMH:  Past Medical History:   Diagnosis Date     Acute right MCA stroke (H) 6/2013    With L sided hemiparesis      Anemia of chronic disease     Baseline Hb 8-9     BPH (benign prostatic hyperplasia)      CHF (congestive heart failure), NYHA class IV (H) 10/9/2012    s/p HeartMate II.  Was on milrinone and dobutamine prior to LVAD      CKD (chronic kidney disease)     Baseline Cr=     Clostridium difficile colitis 12/2012      Dysphagia     PEG tube placed in 2012.  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  Normal carotid duplex 3/2014.       Esophagitis 12/2012    EGD with esophagitis and multiple douenal ulcers     Fracture of femoral neck, right, closed (H) 2/3/2015    Presumed pathologic as patient is non-weight-bearing and suffered no trauma.  Family declined operative repair during hospital stay 1/23 - 1/30/15.     Gastric and duodenal angiodysplasia with hemorrhage 6/18/2015     GERD (gastroesophageal reflux disease)      Gout      Hematuria      HTN (hypertension)     LDL=59 (3/29/2014)     Hyperlipaemia      Ischemic cardiomyopathy      Mitral regurgitation     s/p MVR with Carbomedics ring      Myocardial infarction 1998    In [facility] without intervention      Nonsenile cataract     BE     PFO (patent foramen ovale)     s/p closure (10/9/2012)     TB lung, latent     s/p 9 months INH in 2012        PSH:  Past Surgical History:   Procedure Laterality Date     C CABG, VEIN, FIVE  2001     CATARACT IOL, RT/LT Right 11/19/2015     ENDOSCOPIC RETROGRADE CHOLANGIOPANCREATOGRAM N/A 5/9/2017    Procedure: COMBINED ENDOSCOPIC RETROGRADE CHOLANGIOPANCREATOGRAPHY, PLACE TUBE/STENT;  Endoscopic Retrograde Cholangiopancreatogram, Stent (Zimmon Biliary 7fr 12cm) Placement;  Surgeon: Cristo Grove MD;   Location: [facility] OR     ESOPHAGOSCOPY, GASTROSCOPY, DUODENOSCOPY (EGD), COMBINED N/A 6/10/2015    Procedure: COMBINED ESOPHAGOSCOPY, GASTROSCOPY, DUODENOSCOPY (EGD);  Surgeon: Edu Jenkins MD;  Location: [facility] GI     ESOPHAGOSCOPY, GASTROSCOPY, DUODENOSCOPY (EGD), COMBINED N/A 6/15/2015    Procedure: COMBINED ESOPHAGOSCOPY, GASTROSCOPY, DUODENOSCOPY (EGD);  Surgeon: Yury Bonner MD;  Location: [facility] OR     H INSERT ICD  2/10/2011    And pacemaker.  Not BiV     INSERT VENTRICULAR ASSIST DEVICE LEFT (HEARTMATE II)  10/9/2012     IR GASTRO JEJUNOSTOMY TUBE PLACEMENT       PHACOEMULSIFICATION CLEAR CORNEA WITH STANDARD INTRAOCULAR LENS IMPLANT Right 2015    Procedure: PHACOEMULSIFICATION CLEAR CORNEA WITH STANDARD INTRAOCULAR LENS IMPLANT;  Surgeon: Violeta Cosme MD;  Location: [facility] OR     REPAIR PATENT FORAMEN OVALE  10/9/2012     REPAIR VALVE MITRAL  2012    28 mm Carbomedics 2/3 ring        MEDICATIONS:  Prior to Admission Medications   Prescriptions Last Dose Informant Patient Reported? Taking?   [email or web address] CONTOUR test strip   No No   Sig: USE TO TEST BLOOD SUGAR 2 TIMES DAILY OR AS DIRECTED.   Melatonin 10 MG TABS tablet   No No   Sig: Take 1 tablet (10 mg) by mouth At Bedtime   ORDER FOR DME   No No   Sig: Equipment being ordered: Lift chair.    Please see indications and face-to-face encounter details in 2/3/15 Office Visit note.   acetaminophen (TYLENOL) 32 mg/mL solution   No No   Si.3 mLs (650 mg) by Oral or Feeding Tube route every 6 hours as needed for mild pain   aspirin 325 MG tablet   No No   Sig: Take 1 tablet (325 mg) by mouth daily   bisacodyl (DULCOLAX) 10 MG Suppository  Daughter No No   Sig: Place 1 suppository (10 mg) rectally daily as needed for constipation   blood glucose monitoring (ALEK MICROLET) lancets   No No   Sig: USE TO TEST BLOOD SUGAR 2 TIMES DAILY OR AS DIRECTED   blood glucose monitoring (NO BRAND SPECIFIED) lancets   No No   Sig: Use to test blood sugars 2 times  daily or as directed.   finasteride (PROSCAR) 5 MG tablet   No No   Sig: Take 1 tablet (5 mg) by mouth daily Patient needs to be seen in clinic prior to additional refills.   levETIRAcetam (KEPPRA) 100 MG/ML solution   No No   Sig: Take 7.5 mLs (750 mg) by mouth every 12 hours   linezolid (ZYVOX) 600 MG tablet   No No   Sig: Take 1 tablet (600 mg) by mouth every 12 hours for 10 days   loratadine (CLARITIN) 5 MG/5ML syrup   No No   Sig: 10 mLs (10 mg) by Oral or Feeding Tube route daily   nitroGLYcerin (NITROSTAT) 0.4 MG sublingual tablet   No No   Sig: Place 1 tablet (0.4 mg) under the tongue every 5 minutes as needed for chest pain   ondansetron (ZOFRAN) 4 MG/5ML solution   No No   Si mLs (4 mg) by Per Feeding Tube route 2 times daily as needed for nausea or vomiting   order for DME   No No   Sig: Equipment being ordered: Bilateral leg supports for manual wheelchair.   order for DME   No No   Sig: Equipment being ordered: Reclining manual w/c with bilateral elevating leg rests.   order for DME   No No   Sig: Equipment being ordered: Hospital Bed, fully electric.   order for DME   No No   Sig: Equipment being ordered: Wheelchair, manual.   order for DME   No No   Sig: Equipment being ordered: Wheelchair, manual, with elevated leg rests and tilt in space back.  Please fax to [facility] [place]; I called them 16 and they requested the new order.  Face to face is in my 10/26/16 note.   order for DME   No No   Sig: Equipment being ordered: Cushioned heel boots.   order for DME   No No   Sig: Bilateral heel protective cushioning boots.  Dx: previous stroke with left hemiplegia.  Duration of need: 99 months.   order for DME   No No   Sig: Equipment being ordered: Nebulizer   oxyCODONE (ROXICODONE) 5 MG/5ML solution   No No   Sig: Take 5 mLs (5 mg) by mouth every 4 hours as needed for severe pain   pantoprazole (PROTONIX) SUSP suspension   No No   Sig: 10 mLs (20 mg) by Oral or Feeding Tube route 2 times daily   protein  modular (PROSOURCE TF)   No No   Si packet by Per Feeding Tube route 3 times daily   senna-docusate (SENOKOT-S;PERICOLACE) 8.6-50 MG per tablet   Yes No   Sig: Take 1-2 tablets by mouth 2 times daily as needed for constipation   simethicone (MYLICON) 80 MG chewable tablet   Yes No   Sig: Take 80 mg by mouth every 6 hours as needed for flatulence or cramping   tamsulosin (FLOMAX) 0.4 MG capsule   No No   Sig: Take 2 capsules (0.8 mg) by mouth daily   thiamine (VITAMIN B-1) 100 MG tablet   No No   Sig: Take 1 tablet (100 mg) by mouth daily   warfarin (COUMADIN) 1 MG tablet   No No   Sig: Take 1 tablet (1 mg) by mouth daily Or as directed after INR dosing changes      Facility-Administered Medications: None        ALLERGIES:     Allergies   Allergen Reactions     Seasonal Allergies        FAMILY HISTORY:  Family History   Problem Relation Age of Onset     Family History Negative No family hx of      Glaucoma No family hx of      Macular Degeneration No family hx of      Cancer No family hx of      no skin cancer       SOCIAL HISTORY:  Social History     Social History     Marital status:      Spouse name: N/A     Number of children: N/A     Years of education: N/A     Occupational History     Not on file.     Social History Main Topics     Smoking status: Former Smoker     Smokeless tobacco: Former User     Alcohol use No     Drug use: No     Sexual activity: Not on file     Other Topics Concern     Not on file     Social History Narrative       PHYSICAL EXAM:  Blood pressure MAP-60, resp. rate 30.  GENERAL: Appears alert and oriented times three.   HEENT: Eye symmetrical and free of discharge bilaterally. Mucous membranes moist and without lesions.  NECK: Supple and without lymphadenopathy. JVD lower 1/3 of neck upright.   CV: S1S2 present with LVAD hum.   RESPIRATORY: Respirations regular, even, and unlabored. Lungs CTA throughout.   GI: Soft and nn distended with normoactive bowel sounds present in all  quadrants. No tenderness, rebound, guarding. No organomegaly.   EXTREMITIES: Trace bilateral peripheral edema with cool extremities. 1+ bilateral pedal pulses.   NEUROLOGIC: Alert and orientated x 3. CN II-XII grossly intact. No focal deficits.   MUSCULOSKELETAL: No joint swelling or tenderness.   SKIN: No jaundice. No rashes or lesions. LVAD drive line covered.     LABS:  CBC:  Recent Labs   Lab Test  07/13/18   0331   WBC  8.4   RBC  2.74*   HGB  7.4*   HCT  24.6*   MCV  90   MCH  27.0   MCHC  30.1*   RDW  21.7*   PLT  228       CMP:  Recent Labs   Lab Test  07/13/18   0331   NA  132*   POTASSIUM  3.9   CHLORIDE  101   JOSÉ  7.6*   CO2  18*   BUN  49*   CR  3.31*   GLC  171*   AST  83*   ALT  22   BILITOTAL  0.7   ALBUMIN  3.0*   PROTTOTAL  7.0   ALKPHOS  87       IMAGING:  Chest X-ray Pending.     ASSESSMENT & PLAN: Mr. Rendon is a 68 year old male with a past medical history including SCHF secondary to ICM s/p LVAD HM II in 2012 as DT, multiple ischemic CVAs with residual left sided weakness, latent TB, HTN, Hyperlipidemia, PFO s/p closure in 2012, CKD Stage III, BPH, GERD, Gout, Anemia of CD, s/p ICD 2011, s/p MVR by B2Brev ring. He presents to ED with acute respiratory distress secondary to questionable aspiration event.     Acute Hypoxic Respiratory Distress. Secondary to potential aspiration event. Chest X-ray negative for acute findings.   - LA, CBC, BMP, and VBG pending.   -Bipap for support with no advancement to intubation given code status DNR/DNI  - NPO   - Continue Zyvox and broaden with Zosyn to cover gram negative and anaerobes.   - Blood cultures pending.     ICM s/p HM II LVAD. Implanted in 2012 as DT. Complicated by recurrent CVA, hemolysis, and pump thrombus.   Stage D, NYHA Class III  ACEi/ARB hold Losartan in setting of HEATH.   BB Toprol XL discontinued.   Aldosterone antagonist Contraindicated given recent CKD.   SCD prophylaxis ICD.   Fluid status: Mild hypervolemia. Defer aggressive  diuresis in setting of soft BP.   MAP: 60's.   LDH: deferred given history.   Anticoagulation: INR 2.27. Continue Coumadin per pharmacy.   Antiplatelet:  mg po daily.   - No pump alarms noted.     HEATH on CKD Stage III. Multifactorial secondary to LVAD thrombus with concern for perfusion, recent UTI, decreased oral intake, and medications.   - Cr rising at 3.3.   - Resume TF.   - hold Losartan.   - [place] in place to monitor output.     Protein Calorie Malnutrition with Dysphagia.   - Nutrition consult appreciated.   - Resume TF at 50 ml/hr with free water bolus 30 ml q6h  - NPO.     Chronic Abdominal distention. Last BM yesterday.   - Continue to monitor at this time.     Chronic Medical Conditions:  HTN. Losartan on hold in setting of HEATH.   CAD and Hyperlipidemia. BB deferred in RV failure.   Chronic LDH elevation. Likely secondary to Pump thrombus. Coumadin and ASA as above.    FEN: NPO. TF  PROPHY:  Coumadin  LINES:  PIV  DISPO:  TBD  CODE STATUS:  DNR/DNI    Zuleika Patterson FNP-C  Pager [phone]      I have seen and examined the patient as part of a shared visit with Zuleika Patterson NP.  I agree with the note above. I reviewed today's vital signs and medications. I have reviewed and discussed with the advanced practice provider their physical examination, assessment, and plan   Briefly,  Pt well known to our service.  Unfortunately since last stroke has continued to struggle.  Presented today with acute respiratory distress and findings concerning for sepsis  My key history/exam findings are:  Altered mental status, LVAD hemodynamics grossly stable but PIs slightly lower than baseline.  MAP also mildly lower than baseline.  The key management decisions made by me: Will treat with broad spectrum antibiotics and support respiratory status with BiPAP.  Will also given cautious IVF given PIs and MAP below baseline and elevated lactate.  Discussed patient's tenuous status with pt's family.  Will try and  support to allow opportunity for improve with antibiotic therapy.  Given overall condition if patient continues to deteriorate will transition to comfort cares.     vEon Lemons MD  Section Head - Advanced Heart Failure, Transplantation and Mechanical Circulatory Support  Co-Director - Adult [facility]ital [facility]ovascular [facility]  Associate Professor of Medicine, [facility]

## 2018-07-13 NOTE — PLAN OF CARE
Problem: Patient Care Overview  Goal: Plan of Care/Patient Progress Review  1. Patient will remain hemodynamically stable  2:   Hx cva with left sided weakness  3:  Lift dependent  4;  Does not speak much.  Daughter care for him.  5:  Patient will void on his own.   Speech Language Therapy Discharge Summary    Reason for therapy discharge:    Discharged to home with home therapy.    Progress towards therapy goal(s). See goals on Care Plan in [facility] record for goal details.  Goals partially met.  Barriers to achieving goals:   discharge from facility.    Therapy recommendation(s):    Continued therapy is recommended.  Rationale/Recommendations:  Recommending cautiously continue with current DD2 diet with thin liquids - but if positive s/s of aspiration are noted ( ie increased coughing, throat clearing etc) then d/c po; pt needs to be upright for all po, full assist and supervision, small single bites/sips, alternate solids and liquids, multiple swallow as needed, pace self-- eat slowly. TF also to continue for nutritinal support SLP to f/u for diet tolerance/ education. Had provided education to family member yesterday on the types of foods allowed on a DD2 diet as well as safe swallow strategies.

## 2018-07-13 NOTE — ED NOTES
Bed: ED04  Expected date:   Expected time:   Means of arrival:   Comments:  68 M LVAD possible infection eta 10 min. yellow

## 2018-07-13 NOTE — ED NOTES
LVAD readings Heartmate    Pump flow- 3.8 lpm  Pump speed- 8800 rpm  Pulse index- 3.2  Pump power- 5.2 W

## 2018-07-14 NOTE — PROGRESS NOTES
Visited with pt/family on the basis of hospital  request for spiritual support for the pt/family. Reflected with pt/family around their hospital experience, sources of spiritual and emotional support and current spiritual health needs. Pt s daughter talked about her dad current situation and what it means for her and her dad. During my visit, pt was laying down on his bed. Three daughters were with him in the room. During our conversation with pt. s daughter, reported that, she worries about the health of her dad.  I let them know that I could be of support of the pt and his family.  I would be able to coordinate and participate as a spiritual support for both him and his family. Pt receives support from his children and his extended family. Pt s daughters reported that their tanya is important to them and hope for the future and trust in God.  Emotional support. Reflective conversation integrating illness elements and family spiritual narratives.  I shared reading and conversation that would invite God into the room and to bless those present, support them in their suffering. I provided a special prayer asking of God to help their son and to ease and eliminate any suffering and pain that they feel. I gave Islamic Prayer Booklet.    Pt/family received spiritual support and reflective conversation in the context of this hospitalization. The pt's daughter expressed appreciation for the visit and the encouragement that they felt that they have God s support in their struggles. They requested ongoing Bahai  support.  PLAN: Will continue to provide support to pt/family during their hospitalization at least 1x/wk.

## 2018-07-14 NOTE — PROGRESS NOTES
Patient tx to 4E from ED at ~1820. Temp 90 degrees F and blanket warmer placed. VSS. Hard to obtain SpO2 rating. Dr. Lemons at bedside and aware. Continue with BiPap and abx. Patient's daughter and family at bedside and updated by Dr. Lemons. Will continue to monitor.

## 2018-07-14 NOTE — PROGRESS NOTES
CLINICAL NUTRITION SERVICES - ASSESSMENT NOTE     Nutrition Prescription    RECOMMENDATIONS FOR MDs/PROVIDERS TO ORDER:  1.Free water flushes per provider pending fluid status.   2.If/when appropriate consider a speech consult, pt with history of dysphagia.     Malnutrition Status:    Patient does not meet two of the above criteria necessary for diagnosing malnutrition but is at risk for malnutrition.    Recommendations already ordered by Registered Dietitian (RD):  1. Continue with Nepro @ goal 50 ml/hr (1200 ml/day) to provide 2160 kcals (26 kcal/kg/day), 97 g PRO (1.2 g/kg/day), 876 ml free H2O, 193 g CHO and 15 g Fiber daily.  2. Ordered 1 prosource pkt daily for an additional 40 kcals and 11 g PRO  3. Ordered Mg++ and Phos with pt on renal formula.     Future/Additional Recommendations:  1. Monitor lytes and the need for Nepro (renal formula) vs a standard formula. Of note K+ tends to run on the high end of normal.   2. Monitor for the ability and appropriateness to cycle TF     REASON FOR ASSESSMENT  Sohan Rendon is a/an 68 year old male assessed by the dietitian for Provider Order - Registered Dietitian to Assess and Recommend TF.  Provider is responsible for entering the TF orders    NUTRITION HISTORY  Pt was discharged on 7/12 and then readmitted on 7/13.     - Pt was previously on sent home on 14 cycle TF from his admission in June 2018. The pt was then readmitted on 7/8/18 and TF restarted on 7/11/18 with a continuous regimen of Nepro @50 ml/hr. He was discharged without TF as they were discontinued on 7/12 but was sent home with FT in place. Of note speech has been following pt during past admission d/t dysphagia diet level 2.     Pt currently remains NPO and has TF (Nepro) running @50 ml/hr.     CURRENT NUTRITION ORDERS  Diet: NPO  Nutrition Support: Nepro @ goal 50 ml/hr (1200 ml/day) to provide 2160 kcals (26 kcal/kg/day), 97 g PRO (1.2 g/kg/day), 876 ml free H2O, 193 g CHO and 15 g Fiber  "daily.  Intake/Tolerance: No PO intake since admission. TF currently running at 50 ml/hr     LABS  Labs reviewed  Na+: 131 (L)  K+: 5.2 (Upper end of normal) - pt K+ has been running borderline high in recent admission.     MEDICATIONS  Medications reviewed    ANTHROPOMETRICS  Height: 0 cm (Data Unavailable)  Ht Readings from Last 2 Encounters:   07/13/18 1.727 m (5' 8\")   07/05/18 1.727 m (5' 8\")     Most Recent Weight: 92.7 kg (204 lb 5.9 oz)    IBW: 70 kg  BMI: Overweight BMI 25-29.9 - using recent low wt of 85.7 kg on 7/12.  Weight History:   Wt Readings from Last 15 Encounters:   07/14/18 92.7 kg (204 lb 5.9 oz)   07/13/18 90.7 kg (200 lb)   07/12/18 85.7 kg (188 lb 15 oz)   07/05/18 84.4 kg (186 lb)   06/17/18 84.4 kg (186 lb 1.1 oz)   05/31/18 89.3 kg (196 lb 13.9 oz)   05/16/18 86.2 kg (190 lb)   05/10/18 89.2 kg (196 lb 10.4 oz)   05/07/18 83.5 kg (184 lb)   05/03/18 86.8 kg (191 lb 5.8 oz)   04/17/18 94.3 kg (208 lb)   04/11/18 81.6 kg (180 lb)   03/18/18 82.6 kg (182 lb 1.6 oz)   11/26/17 84.2 kg (185 lb 10 oz)   11/17/17 81.6 kg (180 lb)       Dosing Weight: 84 kg (based on lowest wt recent weight of 83.8 kg on 7/10)     ASSESSED NUTRITION NEEDS  Estimated Energy Needs: 3997-8175 kcals/day (25 - 30 kcals/kg)  Justification: Maintenance needs  Estimated Protein Needs: 101-126 grams protein/day (1.2 - 1.5 grams of pro/kg)  Justification: Increased needs with possible stress factors and with cardiac status  Estimated Fluid Needs: 8409-3167 mL/day (25 - 30 mL/kg)   Justification: Maintenance needs or per team, pending fluid status    PHYSICAL FINDINGS  See malnutrition section below.    MALNUTRITION  % Intake: No decreased intake noted - Pt on nutrition support  % Weight Loss: None noted  Subcutaneous Fat Loss: None observed  Muscle Loss: Temporal, Facial & jaw region, Scapular bone and Thoracic region (clavicle, acromium bone, deltoid, trapezius, pectoral):  Mild  Fluid Accumulation/Edema: " Mild  Malnutrition Diagnosis: Patient does not meet two of the above criteria necessary for diagnosing malnutrition but is at risk for malnutrition    NUTRITION DIAGNOSIS  Inadequate oral intake related to dysphagia and need for a feeding tube as evidenced by pt currently NPO and receiving nutrition support (TF) to meet 100% of the pt's nutritional needs.    INTERVENTIONS  Implementation   Enteral Nutrition - Modify added prosource pkts      Goals  Total avg nutritional intake to meet a minimum of 25 kcal/kg and 1.2 g PRO/kg daily (per dosing wt 84 kg).     Monitoring/Evaluation  Progress toward goals will be monitored and evaluated per protocol.    Alejandra Valladares, MS, RD, LD  Weekend pgr:  [phone]

## 2018-07-14 NOTE — PLAN OF CARE
Problem: Patient Care Overview  Goal: Individualization & Mutuality  [facility], [place]     Death Note    Death occurred at: July 14, 2018, 2:25 PM   Events preceding: Multi system failure    Family / Significant Other: Family was present   Tissue / Organ Donation: Donation was not discussed   Autopsy: Autopsy was discussed   Belongings: Sent home     Recorded by ALEX HARRISON

## 2018-07-14 NOTE — PHARMACY-CONSULT NOTE
Pharmacy Warfarin Consult Note     Pharmacy has been consulted to manage this patient s warfarin therapy.  Indication: LVAD/RVAD  Therapy Goal: INR 2-3  OP Anticoag Clinic: [facility]  Warfarin Prior to Admission: Yes  Warfarin PTA Regimen: Warfarin 1 mg daily (patient has both 1 mg and 3 mg tablets at home, family reports that dose frequently fluctuates). Patient received warfarin 1 mg on 7/12/2018.  Significant drug interactions: (1) New: Zosyn + LVQ: alteration in Vit K sysnthesis and warfarin metabolism, increased INR; (2) Existing: linezolid: alteration in Vit K synthesis leading to increased INR; aspirin: increased risk of bleeding  Recent documented change in oral intake/nutrition: Yes (Poor oral intake)  Dose Comments: Given addition of broad spectrum antibiotics with significant drug interactions with warfarin and increase in INR since this AM, will hold dose tonight see which direction INR trends. Will also clarify INR goal with primary team in AM.    INR   Date Value Ref Range Status   07/13/2018 2.77 (H) 0.86 - 1.14 Final   07/13/2018 2.27 (H) 0.86 - 1.14 Final     Recommend holding warfarin dose tonight.  Pharmacy will monitor Sohan Rendon daily and order warfarin doses to achieve specified goal.      Please contact pharmacy as soon as possible if the warfarin needs to be held for a procedure or if the warfarin goals change.      Radha Mcgrath, PharmD  July 13, 2018

## 2018-07-14 NOTE — PLAN OF CARE
Problem: Ventricular Assist Device (Adult)  Goal: Signs and Symptoms of Listed Potential Problems Will be Absent, Minimized or Managed (Ventricular Assist Device)  Signs and symptoms of listed potential problems will be absent, minimized or managed by discharge/transition of care (reference Ventricular Assist Device (Adult) CPG).   Outcome: No Change  Pt with chronic embolism to VAD. VAD dressing changed. No change from baseline in numbers.    Problem: Patient Care Overview  Goal: Plan of Care/Patient Progress Review  Outcome: No Change  N: Pt obtunded. Localized RUE to stimulation. Withdraws RLE to pain. L. Extremities at baseline with minimal movement. Pupils sluggish and reactive. Pt increasingly agitated overnight. Olanzapine PO given.  Pt mildly hypotensive overnight 250 mL bolus given with some improvement. No significant change in VAD numbers overnight. SR 90's to 100's paced. Pt on bipap 10/5 FIO@ 90% overnight. Unable to get reliable SPO2. BG this AM shows metabolic acidosis and supraoxygenation. Attempted to put Pt on 4L NC but pt with tachypnea and dyspnea. Bipap restarted with FIO@ 40% for comfort. Pt had minimal UOP overnight. 250mL fluid bolus given with no improvement. Team aware.  TF restarted. R lower FA PIV infiltrated and was removed. Continue plan of care.     Problem: Renal Insufficiency Comorbidity  Goal: Renal Insufficiency  Patient comorbidity will be monitored for signs and symptoms of Renal Insufficiency (Chronic) condition.  Problems will be absent, minimized or managed by discharge/transition of care.   Outcome: Declining  BUN and Creat increasing. Pt had minimal output overnight with no response to fluid bolus. Team aware of decreased renal function.

## 2018-07-14 NOTE — DISCHARGE SUMMARY
[facility]  Discharge Summary     Sohan Rendon MRN# [id number]   YOB: 1950 Age: 68 year old       Admission Date: 7/13/2018  Discharge Date: 7/14/2018    Discharge Diagnoses:  1. Respiratory Arrest  2. Multi-System Organ failure    Imaging:  CXR, 7/14/2018:  Stable streaky bibasilar and left retrocardiac opacities, atelectasis  or aspiration/consolidation with small bilateral pleural effusions.    Procedures:  None    Consults:  None    HPI (adapted from admission H&P)  Mr. 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 He presents from home with worsening shortness of breath. His daughter noted that this morning his breathing continued to get worse with productive cough. She also notes diaphoresis this AM. Following changes in breathing they administered Oxycodone. She states he has remained alert and somewhat combative throughout the day even with the administration of Oxycodone.      Upon presentation to ED his oxygen saturations dropped to 50% requiring Bipap. Labs remain pending. Chest X-ray relatively unchanged from prior. He will present to floor for Bipap support for 24 hours with plan to reassess acuity and goals at that time.     Brief Hospital Course  Patient's declining status discussed with family upon arrival to the CCU. The suspicion of major aspiration event was explained and the team and family jointly agreed to manage with BIPAP and antibiotics for 24  hours with discussion about comfort care if no signs of improved. Mr. Rendon's status was unchanged overnight. Lactate unchanged 9.5-->8.5. Patient obtunded overnight but withdraws to pain. Patient increasingly agitated overnight, given Olanzapine 5 mg PO with some improvement. Patient initially responded to 250 ml IV bolus. His antibiotic regimen included Levofloxacin, Linezolid, and Zosyn. ABG showed patient well oxygenated and oxygen requirements were initially reduced to 4L NC but patient became tachypneic so was switched to BIPAP with 40% fiO2. Chest x-ray the next morning showed increased right basilar opacity consistent with aspiration. By mid-day of Hospital Day 2, lactate uptrended to 10.8 and pH downtrended to 7.02 and HCO3 to 7. Renal and liver function worsened with Cr 4.58 and ALT 1346, respectively, consistent with multi-system organ failure. Shortly thereafter, HR dropped from 110 to 60 and was newly pacemaker-dependent. Patient developed agonal breathing and then became apneic. Death was confirmed at 2:25 after no breath sounds were appreciated for 1 minute and pupillary reflex was absent. The most likely cause of death was an aspiration pneumonia with possible thrombotic event from the LVAD causing gut ischemia. Family was updated and consoled at bedside. Magnet was placed to disable ICD therapies and after extended family had exited, LVAD was disconnected. Mr. Rendon's family was offered autopsy, which was declined.    PHYSICAL EXAM:  GENERAL: Obtunded, unresponsive.  HEENT: Pupillary reflex absent.  NECK: Supple and without lymphadenopathy. JVD lower 1/3 of neck upright.   CV: S1S2 present with LVAD hum.   RESPIRATORY: Patient absent  GI: Soft and nn distended with absent bowel sounds. No tenderness, rebound, guarding. No organomegaly.   EXTREMITIES: Trace bilateral peripheral edema with cool extremities. Absent pedal pulses.   NEUROLOGIC: Obtunded  MUSCULOSKELETAL: No joint swelling or tenderness.    SKIN: No jaundice. No rashes or lesions. LVAD drive line covered.     Discharge Information  Disposition:  Patient  during this admission  Discharge weight: 199 lbs  Discharge creatinine: 4.58    Medication Changes  N/A    Discharge Medications  Current Discharge Medication List      CONTINUE these medications which have NOT CHANGED    Details   acetaminophen (TYLENOL) 32 mg/mL solution 20.3 mLs (650 mg) by Oral or Feeding Tube route every 6 hours as needed for mild pain  Qty: 300 mL, Refills: 3    Associated Diagnoses: Aphasia      ASPIRIN LOW DOSE 81 MG chewable tablet Take 324 mg by mouth daily  Refills: 3      bisacodyl (DULCOLAX) 10 MG Suppository Place 1 suppository (10 mg) rectally daily as needed for constipation  Qty: 5 suppository, Refills: 1    Associated Diagnoses: Drug-induced constipation      finasteride (PROSCAR) 5 MG tablet Take 1 tablet (5 mg) by mouth daily Patient needs to be seen in clinic prior to additional refills.  Qty: 30 tablet, Refills: 0    Associated Diagnoses: BPH (benign prostatic hyperplasia)      levETIRAcetam (KEPPRA) 100 MG/ML solution Take 7.5 mLs (750 mg) by mouth every 12 hours  Qty: 500 mL, Refills: 3    Associated Diagnoses: Seizure (H)      linezolid (ZYVOX) 600 MG tablet Take 1 tablet (600 mg) by mouth every 12 hours for 10 days  Qty: 20 tablet, Refills: 0    Associated Diagnoses: Urinary tract infection without hematuria, site unspecified      Melatonin 10 MG TABS tablet Take 1 tablet (10 mg) by mouth At Bedtime  Qty: 30 tablet, Refills: 11    Associated Diagnoses: Insomnia due to medical condition      nitroGLYcerin (NITROSTAT) 0.4 MG sublingual tablet Place 1 tablet (0.4 mg) under the tongue every 5 minutes as needed for chest pain  Qty: 30 tablet, Refills: 1    Associated Diagnoses: Coronary artery disease involving native coronary artery of native heart with unstable angina pectoris (H)      ondansetron (ZOFRAN) 4 MG/5ML solution 5 mLs (4 mg) by Per Feeding Tube  route 2 times daily as needed for nausea or vomiting  Qty: 90 mL, Refills: 0      oxyCODONE (ROXICODONE) 5 MG/5ML solution Take 5 mLs (5 mg) by mouth every 4 hours as needed for severe pain  Qty: 473 mL, Refills: 0      pantoprazole (PROTONIX) SUSP suspension 10 mLs (20 mg) by Oral or Feeding Tube route 2 times daily  Qty: 400 mL, Refills: 3    Associated Diagnoses: Cerebrovascular accident (CVA), unspecified mechanism (H); LVAD (left ventricular assist device) present (H)      protein modular (PROSOURCE TF) 1 packet by Per Feeding Tube route 3 times daily  Qty: 90 packet, Refills: 3    Associated Diagnoses: Cerebrovascular accident (CVA), unspecified mechanism (H)      senna-docusate (SENOKOT-S;PERICOLACE) 8.6-50 MG per tablet Take 1-2 tablets by mouth 2 times daily as needed for constipation      simethicone (MYLICON) 80 MG chewable tablet Take 80 mg by mouth every 6 hours as needed for flatulence or cramping      tamsulosin (FLOMAX) 0.4 MG capsule Take 0.4 mg by mouth daily      thiamine (VITAMIN B-1) 100 MG tablet Take 1 tablet (100 mg) by mouth daily  Qty: 90 tablet, Refills: 3    Associated Diagnoses: Cerebrovascular accident (CVA) due to embolism of right middle cerebral artery (H)      warfarin (COUMADIN) 1 MG tablet Take 1 tablet (1 mg) by mouth daily Or as directed after INR dosing changes  Qty: 120 tablet, Refills: 11    Associated Diagnoses: LVAD (left ventricular assist device) present (H)      ALEK CONTOUR test strip USE TO TEST BLOOD SUGAR 2 TIMES DAILY OR AS DIRECTED.  Qty: 100 strip, Refills: 2    Associated Diagnoses: Hypoglycemia      blood glucose monitoring (ALEK MICROLET) lancets USE TO TEST BLOOD SUGAR 2 TIMES DAILY OR AS DIRECTED  Qty: 100 each, Refills: 2    Comments: WAO  Associated Diagnoses: Diabetes mellitus, type 2 (H)      blood glucose monitoring (NO BRAND SPECIFIED) lancets Use to test blood sugars 2 times daily or as directed.  Qty: 1 Box, Refills: 3    Associated Diagnoses:  Diabetes mellitus, type 2 (H)      !! order for DME Equipment being ordered: Nebulizer  Qty: 1 each, Refills: 11    Associated Diagnoses: Pulmonary atelectasis      !! order for DME Bilateral heel protective cushioning boots.  Dx: previous stroke with left hemiplegia.  Duration of need: 99 months.  Qty: 2 each, Refills: 0    Associated Diagnoses: 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)      !! order for DME Equipment being ordered: Cushioned heel boots.  Qty: 2 each, Refills: 0    Associated Diagnoses: 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)      !! order for DME Equipment being ordered: Wheelchair, manual, with elevated leg rests and tilt in space back.  Please fax to [employer]; I called them 11/26/16 and they requested the new order.  Face to face is in my 10/26/16 note.  Qty: 1 each, Refills: 0    Associated Diagnoses: 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); Displaced fracture of right femoral neck, closed, with nonunion, subsequent encounter      !! order for DME Equipment being ordered: Wheelchair, manual.  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 order for DME Equipment being ordered: Hospital Bed, fully electric.  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 order for DME Equipment being ordered: Reclining manual w/c with bilateral elevating leg rests.  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 order  for DME Equipment being ordered: Bilateral leg supports for manual wheelchair.  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 ORDER FOR DME Equipment being ordered: Lift chair.    Please see indications and face-to-face encounter details in 2/3/15 Office Visit note.  Qty: 1 Device, Refills: 0    Associated Diagnoses: Fracture of femoral neck, right, closed, initial encounter; Stroke (H); CHF (congestive heart failure), NYHA class IV (H)       !! - Potential duplicate medications found. Please discuss with provider.      STOP taking these medications       aspirin 325 MG tablet Comments:   Reason for Stopping:               Discharge Follow-up  N/A Patient  during this admission  Labs/imaging:     Code Status  DNR/DNI    I,Evon Lemons MD, have seen and examined this patient. I have discussed with the team and agree with the findings and discharge plan as outlined above.  It has been a pleasure to participate in the care of Mr. Rendon - please do not hesitate to contact me at the number below with any questions.    Evon Lemons MD  Section Head - Advanced Heart Failure, Transplantation and Mechanical Circulatory Support  Co-Director - Adult Congenital and Cardiovascular Genetics Center  Associate Professor of Medicine, [facility]  Patient Care Team:  Shilpi Agosto, APRN CNP as PCP - General (Internal Medicine)  Vivi Toledo, RN as VAD Coordinator (Nurse)  Edu Calabrese MD as MD (Internal Medicine)  Henry Taylor MD as MD (Family Medicine - Sports Medicine)  Henry Martinez MD as MD (Ophthalmology)  Evon Lemons MD as Cardiologist (Cardiology)  Kerri Deutsch LICSW as  ( - Clinical)  Connor Mari MD as Resident (Ophthalmology)  Julien Rodriguez MD as  Resident (Student in organized health care education/training program)  Nena Salazar, RN as Lead Care Coordinator  Nathalie Haas as Other (see comments)  Liz Sampson, Jaleel Packer MD as Resident (Student in organized health care education/training program)  Raghu Almodovar MD as MD (Family Practice)  [facility] (HOME HEALTH AGENCY ([facility] ([place]

## 2018-07-14 NOTE — PROGRESS NOTES
MD DEATH PRONOUNCEMENT    Called to Mr. Rendon's bedside as patient appeared to be apneic and had become hypotensive with BP 60/50.    Physical Exam: Spontaneous respirations absent, Breath sounds absent, Heart sounds absent and Pupillary light reflex absent    Patient was pronounced dead at 2:25 PM, July 14, 2018.    Magnet placed over ICD. LVAD remained operational, but will be disconnected once family has said their goodbyes.    Cause of Death: Respiratory Failure.    Calvin Gallo MD  Cardiology Fellow, PGY-5  Pager #[phone]      Evon Lemons MD  Section Head - Advanced Heart Failure, Transplantation and Mechanical Circulatory Support  Co-Director - Adult Congenital and Cardiovascular Genetics Center  Associate Professor of Medicine, [employer] [facility]

## 2018-07-16 ENCOUNTER — ANTICOAGULATION THERAPY VISIT (OUTPATIENT)
Dept: ANTICOAGULATION | Facility: CLINIC | Age: 68
End: 2018-07-16

## 2018-07-16 ENCOUNTER — PATIENT OUTREACH (OUTPATIENT)
Dept: GERIATRIC MEDICINE | Facility: CLINIC | Age: 68
End: 2018-07-16

## 2018-07-16 DIAGNOSIS — Z95.811 LVAD (LEFT VENTRICULAR ASSIST DEVICE) PRESENT (H): ICD-10-CM

## 2018-07-16 DIAGNOSIS — Z79.01 LONG-TERM (CURRENT) USE OF ANTICOAGULANTS: ICD-10-CM

## 2018-07-16 NOTE — MR AVS SNAPSHOT
Sohan [facility]   2018   Anticoagulation Therapy Visit    Description:  68 year old male   Provider:  Sarah Osborne RN   Department:  [facility]           INR as of 2018     Today's INR       Anticoagulation Summary as of 2018     INR goal    Prior goal 1.8-2.5   Today's INR    Full warfarin instructions No maintenance plan   Next INR check     Indications   LVAD (left ventricular assist device) present [Z95.811]  Long-term (current) use of anticoagulants [Z79.01] [Z79.01]         Anticoagulation Episode Summary     Resolved date 2018    Resolved reason Patient

## 2018-07-16 NOTE — PROGRESS NOTES
[facility] Care Coordination Contact  Notified by [facility] note that member passed away on 07/14/18 at Hospital.    PCP notified. Called all providers to cancel services.    CM left a message with Bridget [facility] ([phone]) to inform her.  Called family member/caregiver to offer condolences.   Death Notification form completed and faxed to [facility].    Chart reviewed, notes printed and this CC's encounter closed. Chart handed off to CMS to process disenrollment tasks.  Nena Salazar RN  [facility]   [phone]

## 2018-07-19 LAB
BACTERIA SPEC CULT: NO GROWTH
Lab: NORMAL
SPECIMEN SOURCE: NORMAL

## 2018-08-06 PROCEDURE — G0180 MD CERTIFICATION HHA PATIENT: HCPCS | Performed by: FAMILY MEDICINE

## 2018-08-07 ENCOUNTER — MEDICAL CORRESPONDENCE (OUTPATIENT)
Dept: HEALTH INFORMATION MANAGEMENT | Facility: CLINIC | Age: 68
End: 2018-08-07

## 2018-08-10 ENCOUNTER — DOCUMENTATION ONLY (OUTPATIENT)
Dept: CARDIOLOGY | Facility: CLINIC | Age: 68
End: 2018-08-10

## 2018-08-10 NOTE — PROGRESS NOTES
Notification e-mail/call of patient's death went out to HIM, LVAD Coordinator, Usha, Jacquelin/Jasson and Research Coordinators.    Notification e-mail of patient's death went out to cardiologist Dr. Lemons along with information of Patient's referring cardiologist.    DOD 7/14/18    E-mail was sent out on 7/23/18 by Abdelrahman Queen.

## 2018-08-13 LAB
MYCOBACTERIUM SPEC CULT: NORMAL
MYCOBACTERIUM SPEC CULT: NORMAL
SPECIMEN SOURCE: NORMAL

## 2019-01-15 NOTE — PROGRESS NOTES
"[facility]  Cardiology 2 Service  Progress Note  February 13, 2017    Subjective:   Feeling well this AM, decreased abdominal pain. Looking forward to trying pudding. No fevers, chills, chest pain, or trouble breathing. Family at bedside, assisting with translation and plan of care addressed.     Pertinent Medications:  Ertapenem, renally dosed   Warfarin   Losartan (Home med held on admit, restarted on 2/13)   Metoprolol 25mg qhs  Will initiate Metoprolol 50mg qAM on 2/14   Levetiracetem    Examination:  /83 (BP Location: Right arm)  Pulse 64  Temp 97.4  F (36.3  C) (Oral)  Resp 18  Ht 1.727 m (5' 8\")  Wt 44.7 kg (98 lb 8 oz)  SpO2 98%  BMI 14.98 kg/m2  GEN: A & Ox3, very pleasant [place] man, chronically ill appearing male, NAD, cooperative and conversational   HEENT: PERRL, no scleral icterus, mucus membranes moist  NECK: Supple, no LAD, JVP not elevated   RESP: CTA bilaterally, no wheezing, no crackles, no increased work of breathing   CV: Regular, normal rate, S1 and S2 without m/r/g   ABD: Two scars in center of abdomen (per daughters, reportedly from prior feeding tube), Soft, minimally tender to palpation in RLQ (decreased since yesterday, but still very minimally present) no HSM, +BS, +minimal guarding  EXT: No peripheral edema, warm/well perfused   NEURO: CN II-XII intact, normal 5/5 strength in all extremitites  SKIN: Normal skin turgor, no rash on limited exam    Data & Hemodynamics reviewed in EPIC.     Assessment and Plan  Sohan Rendon is a 67 year old man with chronic systolic heart failure 2/2 to ischemic CM s/p HM II LVAD placement in [place] now Destination Therapy due to multiple CVA's with residual left sided weakness c/b recurrent hemolysis and pump thrombus, who was admitted for 24 hr history of RLQ abdominal pain found to have CT evidence of non-perforated appendicitis.  Clinically improving with conservative management on Ertapenem.     Changes to " Anterior Platysmal Bands Units: 0 Detail Level: Detailed Plan today:  -Advance to Full Liquid diet today; if tolerating can advance to Regular Diet for dinner, per Surgery  -Continue Ertapenem with plan to change to PO antibiotics (Levaquin and Flagyl) one day prior to discharge   -Restarting Losartan 25mg PO tab (home medication)  -Plan to restart home med Metoprolol 50mg qday, first dose tomorrow AM   -Discontinued heparin and Continue home Warfarin (INR goal 2.5-3.0)     #Appendicitis: Presented with RLQ pain, tenderness at McBurney's point. Afebrile, hemodynamically stable. Normal WBC. CT scan with non-perforated appendicitis. There is also a nonobstructing nephrolithiasis in both kidneys as well.   --Surgery consulted, appreciate recs  --Ertapenem renally dosed per Pharmacy; will attempt conservative management. If this fails, pt agreeable to appendectomy.   --Oxycodone 5mg q4h prn      #Chronic systolic heart failure secondary to ischemic CM:  Stage D, NYHA Class III B. S/p HM II LVAD placement in [place] now DT due to multiple CVAs with residual left sided weakness complicated by recurrent hemolysis and pump thrombus. Focus of care remains on quality of life.    --Not on ACEi/ARB because he did not tolerate it  --LVAD settings: Flow 4.1 lpm, PI 5.7, Speed 8800 rpm, Power 5.2 capps  --Atorvastin 10mg PO daily  --Holding home Lasix 20mg PO daily   --Metoprolol 50mg daily, 25mg qhs   --Losartan 25mg daily   --SCD prophylaxis: ICD  --% BiV pacing:   N/A  --Fluid status: euvolemic. Avoid dehydration  --INR goal: 2.5-3 due to CVAs and pump thrombus.  Coumadin clinic to adjust.  --Antiplatelet agents:   --NSAID use: No     #ICD interrogation: Last interrogated on 1/11 with intrinsic rhythm is 65 bpm. Normal ICD function. No arrhythmias recorded. OptiVol thoracic impedance slightly above baseline.   85%.     #Cough: Evaluated by ED on 2/7, felt most likely Benzonatate 100mg PO qam  --Discontinue diphenhydramine, initiate Robitussen   --Sputum culture      #HTN:  Consent: Written consent obtained. Risks include but not limited to lid/brow ptosis, bruising, swelling, diplopia, temporary effect, incomplete chemical denervation. currently controlled, no issues with hypotension.   *Home regimen: Lasix 20mg once daily, Losartan 25mg PO tablet, Metoprolol as above   --Hold lasix and Losartan given HEATH     #Multiple CVAs with residual and recurrent hemolysis/thrombus:  No significant rehab potential.  Continue coumadin and ASA.  Not candidate for pump exchange.  Continue to focus on symptom management.        #History of seizure: Continue Levetiracetam 750mg PO BID      #Cough: Evaluated by ED on 2/7, felt most likely viral  --Robitussen PRN (will also schedule doses to ensure that pt is offered this med, but ok to decline)      #Gout: Allopurinol 100mg PO daily      #Constipation: Bisocodyl 5mg PO daily prn      #BPH: Finasteride 5mg PO daily, flomax .4mg daily      #Iron Deficiency Anemia: Ferrous sulfate 325mg PO daily with breakfast and Vitamin C  #Insomnia: Meltaonin 1mg qhs     ##Other  -DVT Prophylaxis: Continue warfarin   -Bowel: Senna-docusate and Dulcolax   - FEN: Advancing diet as tolerated   - IVF: Full liquid diet for lunch, ok to progress to Regular diet if tolerates  - Electrolyte Protocol: Yes  - Telemetry: Yes  - Family: Daughters at bedside updated  - Code status: Full Code     Disposition: Cards 2 Service on 6C pending clinical course (conservative management vs. Appendectomy)     Jess Moore MD  Internal Medicine, PGY2  Pager [phone]      I personally saw and examined this patient, reviewed imaging and laboratory studies, confirmed physical examination and discussed results and plan with patient and or family.   Glabellar Complex Units: 20 Dilution (U/0.1 Cc): 1 Forehead Units/Cc: 8 Price (Use Numbers Only, No Special Characters Or $): 420 Post-Care Instructions: Patient instructed to not lie down for 4 hours and limit physical activity for 24 hours. Document As Units Or Cc?: units

## 2020-06-23 NOTE — IP AVS SNAPSHOT
UNIT [address]B [facility]: [phone]                                              INTERAGENCY TRANSFER FORM - PHYSICIAN ORDERS   2018                    Hospital Admission Date: 2018  DANIEL RICHARD   : 1950  Sex: Male        Attending Provider: Bhavik Blackmon MD     Allergies:  Seasonal Allergies    Infection:  VRE, ESBL   Service:  NEUROLOGY    Ht:  1.829 m (6')   Wt:  84.4 kg (186 lb 1.1 oz)   Admission Wt:  88.7 kg (195 lb 8.8 oz)    BMI:  25.24 kg/m 2   BSA:  2.07 m 2            Patient PCP Information     Provider PCP Type    CLAIRE Poe CNP General      ED Clinical Impression     Diagnosis Description Comment Added By Time Added    Cerebrovascular accident (CVA), unspecified mechanism (H) [I63.9] Cerebrovascular accident (CVA), unspecified mechanism (H) [I63.9]  Damaris Perez MD 2018 11:30 PM    Aphasia [R47.01] Aphasia [R47.01]  Damaris Perez MD 2018 11:30 PM      Hospital Problems as of 2018              Priority Class Noted POA    Type 2 diabetes mellitus with hyperglycemia, with long-term current use of insulin (H) Medium  2016 Yes    Encephalopathy Medium  2018 Yes      Non-Hospital Problems as of 2018              Priority Class Noted    CHF (congestive heart failure), NYHA class IV (H) Medium  10/9/2012    Gastroesophageal reflux disease with esophagitis Medium  2012    Clostridium difficile colitis Medium  2012    Embolism of posterior inferior cerebellar artery Medium  3/28/2014    Subtherapeutic international normalized ratio (INR) Medium  2014    Cardiac dysrhythmia Medium  4/15/2014    Cerebrovascular accident (CVA) due to embolism of right middle cerebral artery (H) Medium  2014    LVAD (left ventricular assist device) present Medium  2014    Mitral regurgitation Medium  Unknown    TB lung, latent Medium  Unknown    HTN (hypertension) Medium  Unknown    Hyperlipidemia Medium  Unknown    PFO  (patent foramen ovale) Medium  Unknown    BPH (benign prostatic hyperplasia) Medium  Unknown    GERD (gastroesophageal reflux disease) Medium  Unknown    CKD (chronic kidney disease) Medium  Unknown    Dysphagia Medium  Unknown    Gout Medium  Unknown    Myocardial infarction Medium  Unknown    Ischemic cardiomyopathy Medium  Unknown    Hemolysis Medium  5/27/2014    Implantable cardioverter-defibrillator in situ- [employer]-single chamber- NOT dependent Medium  5/31/2014    Thyroid nodule Medium  8/25/2014    Lung nodule, multiple Medium  8/25/2014    Displaced fracture of right femoral neck, closed, with nonunion, subsequent encounter Medium  2/3/2015    Cataract, right Medium  6/1/2015    Gastric and duodenal angiodysplasia with hemorrhage Medium  6/18/2015    Seizure (H) Medium  6/27/2015    Headache Medium  10/15/2015    Coronary artery disease involving native coronary artery with unstable angina pectoris (H) Medium  10/21/2015    Slow transit constipation Medium  10/21/2015    Pseudophakia, right eye Medium  12/9/2015    Complication associated with cardiac pacemaker lead Medium  1/21/2016    Insomnia due to medical condition Medium  4/27/2016    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) Medium  6/3/2016    Long-term (current) use of anticoagulants [Z79.01] Medium  6/6/2016    Chronic cough Medium  9/7/2016    Hematuria, gross Medium  9/17/2016    Health Care Home Medium  1/13/2017    Acute appendicitis without peritonitis Medium  2/23/2017    Non-seasonal allergic rhinitis  Medium  2/23/2017    Irritation of right eye Medium  3/16/2017    Abdominal pain, generalized Medium  5/3/2017    RUQ pain Medium  5/3/2017    Duodenitis Medium  5/4/2017    Acalculous cholecystitis Medium  5/18/2017    Biliary tract obstruction Medium  5/18/2017    Urinary retention Medium  5/18/2017    Acute posthemorrhagic anemia Medium  5/18/2017    Iron deficiency anemia due to chronic blood loss Medium   6/14/2017    Urinary tract infection, site not specified Medium  6/29/2017    Hypoglycemia Medium  7/10/2017    Trapezius strain, left, subsequent encounter Medium  7/18/2017    Left ventricular assist device (LVAD) complication Medium  8/27/2017    Left foot pain Medium  9/26/2017    Intertriginous dermatitis associated with moisture Medium  10/10/2017    Hematuria Medium  11/22/2017    CKD (chronic kidney disease) stage 4, GFR 15-29 ml/min (H) Medium  1/29/2018    Advanced directives, counseling/discussion Medium  3/17/2018    Pancreatitis Medium  5/1/2018    Altered mental status Medium  5/10/2018      Code Status History     Date Active Date Inactive Code Status Order ID Comments User Context    6/22/2018  9:17 AM  DNR/DNI [id number]  Jesusita Kaiser MD Outpatient    6/12/2018  1:48 PM 6/22/2018  9:17 AM DNR/DNI [id number]  Jesusita Kaiser MD Inpatient    6/7/2018  3:50 AM 6/12/2018  1:48 PM Full Code [id number]  Jayna Cruz DO Inpatient    5/12/2018  1:15 PM 6/7/2018  3:50 AM Full Code [id number]  Volodymyr Champion MD Outpatient    5/10/2018  6:22 PM 5/12/2018  1:15 PM Full Code [id number]  Volodymyr Champion MD Inpatient    4/17/2018 12:54 PM 5/10/2018  6:22 PM Full Code [id number]  Zuleika Patterson APRN CNP Outpatient    4/11/2018  4:25 PM 4/17/2018 12:54 PM Full Code [id number]  Doug Dupree MD ED    11/26/2017 12:42 PM 4/11/2018  4:25 PM Full Code [id number]  Mason Toledo MD Outpatient    11/22/2017 10:34 PM 11/26/2017 12:42 PM Full Code [id number]  Jose Roberto Tierney MD Inpatient    8/29/2017  1:23 PM 11/22/2017 10:34 PM Full Code [id number]  Ruben Sevilla MD Outpatient    8/27/2017  2:27 AM 8/29/2017  1:23 PM Full Code [id number]  Doris Montiel MD Inpatient    7/2/2017  3:03 PM 8/27/2017  2:27 AM Full Code [id number]  Avtar Alvarez MD Outpatient    6/29/2017  9:05 PM 7/2/2017  3:03 PM Full Code [id number]  Loni  Statement Selected Xenia Kelsey MD Inpatient    5/14/2017  8:39 AM 6/29/2017  9:05 PM Full Code [id number]  Gabe Peters MD Outpatient    5/3/2017  3:10 AM 5/14/2017  8:39 AM Full Code [id number]  Bridgett Arriaza MD Inpatient    2/14/2017 10:59 PM 5/3/2017  3:10 AM Full Code [id number]  Jess Moore MD Outpatient    9/19/2016  4:03 PM 2/14/2017 10:59 PM Full Code [id number]  Zuleika Patterson, APRN CNP Outpatient    9/17/2016  9:23 PM 9/19/2016  4:03 PM Full Code [id number]  Jaleel Guy MD Inpatient    6/18/2016  8:58 AM 9/17/2016  9:23 PM Full Code [id number]  Bridgett Arriaza MD Outpatient    6/17/2016  2:42 AM 6/18/2016  8:58 AM Full Code [id number]  MauroAngela MD Inpatient    4/25/2016 11:10 AM 6/17/2016  2:42 AM Full Code [id number]  Prosper Poe MD Outpatient    4/11/2016  5:35 AM 4/25/2016 11:10 AM Full Code [id number]  Kelin Arriaga MD Inpatient    10/15/2015  5:20 PM 4/11/2016  5:35 AM DNR [id number]  Remington Fofana MD Outpatient    10/15/2015 10:55 AM 10/15/2015  5:20 PM DNR [id number]  Remington Fofana MD ED    6/29/2015  1:50 PM 10/15/2015 10:55 AM Full Code [id number]  Dhaval Munoz MD Outpatient    6/27/2015  2:36 AM 6/29/2015  1:50 PM Full Code [id number]  Jordi Carrillo MD ED    6/17/2015 12:42 PM 6/27/2015  2:36 AM Full Code [id number]  Mariam Palafox MD Outpatient    6/8/2015  2:27 AM 6/17/2015 12:42 PM Full Code [id number]  Henry Tello MD Inpatient    4/24/2015 10:53 AM 6/8/2015  2:27 AM Full Code [id number]  Sha Lion MD Outpatient    4/23/2015  9:43 PM 4/24/2015 10:53 AM Full Code [id number]  Shemar Cunha APRN CNP Inpatient    1/30/2015 12:35 PM 4/23/2015  9:43 PM Full Code [id number]  Odilon Uriostegui MD Outpatient    1/23/2015  4:00 AM 1/30/2015 12:35 PM Full Code [id number]  Mendez Perez MD Inpatient    10/27/2014 12:39 AM 10/28/2014  3:58 PM Full Code [id number]  Mariam Palafox MD Inpatient    10/4/2014  1:31 PM 10/27/2014 12:39 AM  DNR/DNI [id number]  Anita Parker MD Outpatient    9/29/2014  5:09 PM 10/4/2014  1:31 PM DNR/DNI [id number]  Louie Agosto MD Inpatient    9/24/2014  1:05 AM 9/29/2014  5:09 PM Full Code [id number]  Wandy Lewis MD Inpatient    9/12/2014 12:06 PM 9/24/2014  1:05 AM Full Code [id number]  Roddy Scott MD Outpatient    9/2/2014 10:54 PM 9/12/2014 12:06 PM Full Code [id number]  Edu Angulo MD Inpatient    8/17/2014  9:05 AM 9/2/2014 10:54 PM Full Code [id number]  Bharati Badillo MD Outpatient    8/10/2014  7:18 PM 8/17/2014  9:05 AM Full Code [id number]  Bharati Badillo MD Inpatient    7/25/2014  1:48 PM 8/10/2014  7:18 PM Full Code [id number]  Bharati Badillo MD Outpatient    7/18/2014  9:35 PM 7/25/2014  1:48 PM Full Code [id number]  Hansa Pantoja MD Inpatient    6/13/2014  2:44 PM 7/18/2014  9:35 PM Full Code [id number]  Julio Carbajal MD Outpatient    6/3/2014 12:53 PM 6/13/2014  2:44 PM Full Code [id number]  Mariam Palafox MD Inpatient    4/21/2014  4:19 PM 5/12/2014  7:55 PM Full Code [id number]  Roque Decker Inpatient    4/14/2014  5:23 AM 4/21/2014  4:19 PM Full Code [id number]  Pratibha Sapp MD Inpatient         Medication Review      START taking        Dose / Directions Comments    linezolid 600 MG tablet   Commonly known as:  ZYVOX   Indication:  Urinary Tract Infection   Used for:  Acute cystitis without hematuria        Dose:  600 mg   1 tablet (600 mg) by Oral or Feeding Tube route every 12 hours for 3 days   Quantity:  6 tablet   Refills:  0        protein modular   Used for:  Cerebrovascular accident (CVA), unspecified mechanism (H)        Dose:  1 packet   1 packet by Per Feeding Tube route 3 times daily   Quantity:  90 packet   Refills:  3          CONTINUE these medications which have NOT CHANGED        Dose / Directions Comments    acetaminophen 325 MG tablet   Commonly known as:  TYLENOL        Dose:  650 mg   Take 2 tablets (650 mg) by mouth every  6 hours as needed for mild pain   Quantity:  100 tablet   Refills:  0        aspirin 81 MG tablet   Used for:  Ischemic cardiomyopathy        Dose:  81 mg   Take 1 tablet (81 mg) by mouth daily   Quantity:  30 tablet   Refills:  3        ALEK CONTOUR test strip   Used for:  Hypoglycemia   Generic drug:  blood glucose monitoring        USE TO TEST BLOOD SUGAR 2 TIMES DAILY OR AS DIRECTED.   Quantity:  100 strip   Refills:  2        bisacodyl 10 MG Suppository   Commonly known as:  DULCOLAX   Used for:  Drug-induced constipation        Dose:  10 mg   Place 1 suppository (10 mg) rectally daily as needed for constipation   Quantity:  5 suppository   Refills:  1        * blood glucose monitoring lancets   Used for:  Diabetes mellitus, type 2 (H)        Use to test blood sugars 2 times daily or as directed.   Quantity:  1 Box   Refills:  3        * blood glucose monitoring lancets   Used for:  Diabetes mellitus, type 2 (H)        USE TO TEST BLOOD SUGAR 2 TIMES DAILY OR AS DIRECTED   Quantity:  100 each   Refills:  2    WAO       finasteride 5 MG tablet   Commonly known as:  PROSCAR   Used for:  Incomplete bladder emptying        Dose:  5 mg   Take 1 tablet (5 mg) by mouth daily   Quantity:  90 tablet   Refills:  3        levETIRAcetam 100 MG/ML solution   Commonly known as:  KEPPRA   Used for:  Seizure (H)        Dose:  750 mg   Take 7.5 mLs (750 mg) by mouth every 12 hours   Quantity:  500 mL   Refills:  3        loratadine 10 MG tablet   Commonly known as:  CLARITIN   Used for:  Allergic rhinitis        TAKE 1 TABLET (10 MG) BY MOUTH DAILY   Quantity:  90 tablet   Refills:  2        losartan 25 MG tablet   Commonly known as:  COZAAR   Used for:  Chronic systolic congestive heart failure (H)        Dose:  25 mg   Take 1 tablet (25 mg) by mouth daily   Quantity:  15 tablet   Refills:  3        Melatonin 10 MG Tabs tablet   Used for:  Insomnia due to medical condition        Dose:  10 mg   Take 1 tablet (10 mg) by mouth  At Bedtime   Quantity:  30 tablet   Refills:  11        nitroGLYcerin 0.4 MG sublingual tablet   Commonly known as:  NITROSTAT   Used for:  Coronary artery disease involving native coronary artery of native heart with unstable angina pectoris (H)        Dose:  0.4 mg   Place 1 tablet (0.4 mg) under the tongue every 5 minutes as needed for chest pain   Quantity:  30 tablet   Refills:  1        order for DME   Used for:  Fracture of femoral neck, right, closed, initial encounter, Stroke (H), CHF (congestive heart failure), NYHA class IV (H)        Equipment being ordered: Lift chair.  Please see indications and face-to-face encounter details in 2/3/15 Office Visit note.   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   Quantity:  1 each   Refills:  0        * order for DME   Used for:   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), Displaced fracture of right femoral neck, closed, with nonunion, subsequent encounter        Equipment being ordered: Wheelchair, manual, with elevated leg rests and tilt in space back.  Please fax to [employer]; I called them 11/26/16 and they requested the new order.  Face to face is in my 10/26/16 note.   Quantity:  1 each   Refills:  0        * order for DME   Used for:  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)        Equipment being ordered: Cushioned heel boots.   Quantity:  2 each   Refills:  0        * order for DME   Used for:  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)        Bilateral heel protective cushioning boots.  Dx: previous stroke with left hemiplegia.  Duration of need: 99 months.   Quantity:  2 each   Refills:  0        order for DME   Used for:  Pulmonary atelectasis        Equipment being ordered: Nebulizer   Quantity:  1 each   Refills:  11        pantoprazole 20 MG EC tablet   Commonly known as:  PROTONIX   Used for:  Gastroesophageal reflux disease with esophagitis        Dose:  20 mg   Take 1 tablet (20 mg) by mouth every morning (before breakfast)   Quantity:  30 tablet   Refills:  11        senna-docusate 8.6-50 MG per tablet   Commonly known as:  SENOKOT-S;PERICOLACE        Dose:  1-2 tablet   Take 1-2 tablets by mouth 2 times daily as needed for constipation   Refills:  0        simethicone 80 MG chewable tablet   Commonly known as:  MYLICON        Dose:  80 mg   Take 80 mg by mouth every 6 hours as needed for flatulence or cramping   Refills:  0        tamsulosin 0.4 MG capsule   Commonly known as:  FLOMAX   Used for:  Incomplete bladder emptying        Dose:  0.8 mg   Take 2 capsules (0.8 mg) by mouth daily   Quantity:  60 capsule   Refills:  11        thiamine 100 MG tablet   Used for:  Cerebrovascular accident (CVA) due to embolism of right middle  cerebral artery (H)        Dose:  100 mg   Take 1 tablet (100 mg) by mouth daily   Quantity:  90 tablet   Refills:  3        warfarin 1 MG tablet   Commonly known as:  COUMADIN   Used for:  LVAD (left ventricular assist device) present (H)        Dose:  3 mg   Take 3 tablets (3 mg) by mouth daily Or as directed after INR dosing changes   Quantity:  120 tablet   Refills:  11        * Notice:  This list has 9 medication(s) that are the same as other medications prescribed for you. Read the directions carefully, and ask your doctor or other care provider to review them with you.      STOP taking     furosemide 20 MG tablet   Commonly known as:  LASIX           nystatin-triamcinolone cream   Commonly known as:  MYCOLOG II           TOPROL XL PO                   Summary of Visit     Reason for your hospital stay       MCA stroke  He came to the hospital because of a stroke and was more sleepy could not speak well and could not eat well. We placed a feeding tube through his nose to feed him. We treated a pneumonia and he is finishing antibiotics for a urinary infection. He should be re evaluated for removing the feeding tube and determining if his lasix and metoprolol should be restarted.             After Care     Activity       Your activity upon discharge: activity as tolerated       Diet       Follow this diet upon discharge: Orders Placed This Encounter      Calorie Counts      Adult Formula Drip Feeding: Specified Time Nepro; Nasoduodenal tube; Goal Rate: Nepro at 85 mL/hr x 14 hours (6p-8a or timing per pt preference) - See advancement schedule below; mL/hr; Medication - Tube Feeding Flush Frequency: 150ml q4h; Amount ...      Dysphagia Diet Level 1 Pureed Nectar Thickened Liquids (pre-thickened or use instant food thickener)       Monitor and record       Meals eaten  weight             Referrals     Home care nursing referral       [facility] #[phone]  Resume skilled nursing visits  Monitor cardiac,  neuro and resp status  Monitor hydration, nutrition and bowel status  Instruct Family in tube feeding administration  Instruct in medication administration and evaluate effects  INR draw per orders        Your provider has ordered home care nursing services. If you have not been contacted within 2 days of your discharge please call the inpatient department phone number at [phone] .       Home infusion referral       Your provider has referred you to: [facility] #[phone]  Nepro via Nasoduodenal tube at 85ml/hr x 14hrs  (6pm-8am)  Water flush 150ml q 4hrs    Monitor ability to wean off of tube feedings if PO intake is adequate       Medication Therapy Management Referral       MTM referral reason            Patient had a hospital or ED visit in last 6 months and has more than 10   PTA or Discharge medications    Patient has 5 PTA or Discharge Medications AND one of the following   diagnoses: DM,HF,COPD,AMI DX,PULM HTN       This service is designed to help you get the most from your medications.  A specially trained pharmacist will work closely with you and your doctors  to solve any problems related to your medications and to help you get the   best results from taking them.      The Medication Therapy Management staff will call you to schedule an appointment.       Speech Therapy Referral       *This therapy referral will be filtered to a centralized scheduling office at [facility] and the patient will receive a call to schedule an appointment at a [place] location most convenient for them. *     [facility] provides Speech Therapy evaluation and treatment and many specialty services across the [place] system.  If requesting a specialty program, please choose from the list below.  If you have not heard from the scheduling office within 2 business days, please call [phone] for all locations, with the exception of [place], please call [phone]  "and [place], please call [phone]      Treatment: Evaluation & Treatment  Speech Treatment Diagnosis: Aphasia    Dysphagia  Special Instructions: evaluate and advance feeding  Special Programs: Clinical Swallow Study  Feeding Clinic (SLP and OT and Dietary Evaluate & Treat) ([facility] location only)    Please be aware that coverage of these services is subject to the terms and limitations of your health insurance plan.  Call member services at your health plan with any benefit or coverage questions.      **Note to Provider:  If you are referring outside of [place] for the therapy appointment, please list the name of the location in the \"special instructions\" above, print the referral and give to the patient to schedule the appointment.             Lab Orders     INR                 Follow-Up Appointment Instructions     Future Labs/Procedures    Adult [facility] Follow-up and recommended labs and tests     Comments:    Follow up with [facility] in the next month.     Appointments on [place] and/or [facility] (with [facility] or [facility] provider or service). Call [phone] if you haven't heard regarding these appointments within 7 days of discharge.      Follow-Up Appointment Instructions     Adult [facility] Follow-up and recommended labs and tests       Follow up with [facility] in the next month.     Appointments on [place] and/or [facility] (with [facility] or [facility] provider or service). Call [phone] if you haven't heard regarding these appointments within 7 days of discharge.             Statement of Approval     Ordered          06/22/18 1145  I have reviewed and agree with all the recommendations and orders detailed in this document.  EFFECTIVE NOW     Approved and electronically signed by:  Jesusita Kaiser MD               "

## 2020-07-16 NOTE — MR AVS SNAPSHOT
Sohan Rendon   6/1/2018   Anticoagulation Therapy Visit    Description:  68 year old male   Provider:  Blanca Bah, RN   Department:  [facility]           INR as of 6/1/2018     Today's INR 1.5!      Anticoagulation Summary as of 6/1/2018     INR goal    Prior goal 1.8-2.5   Today's INR 1.5!   Full warfarin instructions 6/1: 3 mg; 6/2: 3 mg; 6/3: 3 mg   Next INR check 6/4/2018    Indications   LVAD (left ventricular assist device) present [Z95.811]  Long-term (current) use of anticoagulants [Z79.01] [Z79.01]         June 2018 Details    Sun Mon Tue Wed Thu Fri Sat          1      3 mg   See details      2      3 mg           3      3 mg         4            5               6               7               8               9                 10               11               12               13               14               15               16                 17               18               19               20               21               22               23                 24               25               26               27               28               29               30                Date Details   06/01 This INR check       Date of next INR:  6/4/2018         How to take your warfarin dose     To take:  3 mg Take 1 of the 3 mg tablets.            [facility]    Patient : Latosha Cope Age: 61 year old Sex: female   MRN: [id number] Encounter Date: 2020    History provided by: Patient  Patient arrived via: Private Vehicle  PCP: No Pcp     History   CHIEF COMPLAINT    Chief Complaint   Patient presents with   • Foot Swelling     + drainage x7days       HPI    Latosha Cope is a 61 year old female who presents to the ED with complaints of wound to left foot.  Patient states that symptoms began on .  States that she had discoloration to the tip of her second toe.  Patient states that she thought there was blood pooled at the tip of her toe.  States that she was picking at this area.  States that over time discoloration of the second toe worsened.  Over the past few days she developed wounds to the top of her foot near her great toe as well as near her pinky toe.  Also notes purple discoloration of her great toe.  Patient states that she has significant pain with ambulating.  Did not have pain with ambulating prior to symptom onset.  States that she has not seen a doctor in many years and denies any significant past medical history.  Patient denies fevers at home, chest pain, difficulty breathing, abdominal pain, vomiting, diarrhea.  No other issues or complaints.    ALLERGIES:   Allergen Reactions   • Penicillins RASH       Prior to Admission Medications    IBUPROFEN (MOTRIN) 200 MG TABLET    Take 400 mg by mouth every 6 hours as needed for Pain.       History reviewed. No pertinent past medical history.    Past Surgical History:   Procedure Laterality Date   •  section, low transverse     • Hernia repair     • Tonsillectomy         History reviewed. No pertinent family history.    Social History     Tobacco Use   • Smoking status: Never Smoker   • Smokeless tobacco: Never Used   Substance Use Topics   • Alcohol use: Not Currently   • Drug use: Never       Constitutional:  Denies fever or chills   Skin: Wound - left foot  Denies rash  Eyes:  Denies change in visual acuity  HENT:  Denies nasal congestion or sore throat  Respiratory:  Denies cough or shortness of breath  Cardiovascular:  Denies chest pain, palpitations or edema  GI:  Denies abdominal pain, nausea, vomiting, bloody stools or diarrhea  :  Denies dysuria   Musculoskeletal:  Denies back pain or joint pain   Neurologic:  Denies headache, focal weakness or sensory changes   Endocrine:  Denies polyuria or polydipsia  Lymphatic:  Denies swollen glands   Psychiatric:  Denies depression or anxiety     Physical Exam     Vitals:    07/16/20 1956 07/16/20 2155 07/16/20 2225 07/16/20 2240   BP: (!) 141/94 136/85 139/90 133/86   Pulse: (!) 109 (!) 108 (!) 103 100   Resp: 20 18 20 20   Temp:       TempSrc:       SpO2: 98% 99% 96% 95%   Weight:           Physical Exam   Constitutional: She is oriented to person, place, and time. She appears well-developed and well-nourished.   HENT:   Head: Normocephalic and atraumatic.   Mouth/Throat: Oropharynx is clear and moist.   Eyes: Pupils are equal, round, and reactive to light. Conjunctivae and EOM are normal. Right eye exhibits no discharge. Left eye exhibits no discharge.   Neck: Normal range of motion. Neck supple.   Cardiovascular: Regular rhythm and normal heart sounds. Tachycardia present.   No murmur heard.  Pulmonary/Chest: Effort normal and breath sounds normal. No respiratory distress. She has no wheezes. She has no rales.   Musculoskeletal: Normal range of motion.      Comments: Left foot - Skin findings to left foot as described below.  Patient able to wiggle toes.  Able to range of motion ankle.   Neurological: She is alert and oriented to person, place, and time.   Skin: Skin is warm and dry.   Inspection of left foot reveals purple discoloration of great toe.  Necrotic tissue noted to second toe, with proximal aspect of toe absent.  Bone is apparent under necrotic tissue.  Patient with further discoloration, duskiness to  third and fifth toes.  Wounds noted over dorsum of foot -significant wound overlying first and second metatarsals.  Smaller wound noted to fourth and fifth metatarsals.  Purulent drainage noted at wound sites.  Sloughing of skin on plantar aspect of foot.  1+ dorsalis pedis pulse obtained via Doppler.  Left foot is grossly edematous and erythematous up to the level of ankle   Psychiatric: She has a normal mood and affect.         Final EKG interpretation by ED physician assistant  EKG Interpretation  Rate:   Rhythm: sinus tachycardia   Abnormality: T wave inversions I, AVL, V5, V6      MEDICATIONS ADMINISTERED  Medications   VANCOMYCIN - PHARMACIST MONITORED (has no administration in time range)   metroNIDAZOLE (FLAGYL) IVPB 500 mg (0 mg Intravenous Completed 7/16/20 1859)     Followed by   metroNIDAZOLE (FLAGYL) IVPB 500 mg (500 mg Intravenous New Bag 7/16/20 2223)   cefepime (MAXIPIME) 2,000 mg in sodium chloride 0.9 % 100 mL IVPB (0 mg Intravenous Completed 7/16/20 1846)     Followed by   cefepime (MAXIPIME) 2,000 mg in sodium chloride 0.9 % 100 mL IVPB (2,000 mg Intravenous New Bag 7/16/20 2222)   lactated ringers bolus 1,000 mL (1,000 mLs Intravenous New Bag 7/16/20 2235)   vancomycin 1,500 mg in sodium chloride 0.9% 250 mL IVPB (0 mg Intravenous Completed 7/16/20 2140)   lactated ringers bolus 1,000 mL (0 mLs Intravenous Completed 7/16/20 1910)     Followed by   lactated ringers bolus 1,000 mL (0 mLs Intravenous Completed 7/16/20 1930)     Followed by   lactated ringers bolus 250 mL (0 mLs Intravenous Completed 7/16/20 1957)   insulin regular human (HumuLIN R) (diluted) 1 unit/mL injection 7 Units (7 Units Intravenous Given 7/16/20 1901)   aspirin chewable 324 mg (324 mg Oral Given 7/16/20 2146)         RADIOLOGY    US Ankle/Brachial 1 or 2 Level Extremity   Final Result   1. Findings suggestive of moderately severe vaso-occlusive disease involving the left leg and severe vaso-occlusive disease of the right  leg.    2. Diminished volume pulse recordings and arterial flow within the toes, bilaterally suggestive of small vessel occlusive disease.      Electronically Signed by: JOYA CHARLES JR, M.D.    Signed on: 7/16/2020 10:08 PM          XR FOOT MIN 3 VIEWS LEFT   Final Result   1. Extensive soft tissue gas involving the distal aspect of the left foot, most extensive around the great toe and 2nd toe suggestive of an infectious or gangrenous process.  Amputation or complete destruction of the distal phalanx of the left 2nd toe.     The middle phalanx of the left 2nd toe appears exposed to air with very little soft tissue surrounding it.    2. There are erosive /destructive changes involving the tip of the distal phalanx left 3rd toe suggestive of osteomyelitis.   3.  No significant abnormalities at the left ankle.      Electronically Signed by: JOYA CHARLES JR, M.D.    Signed on: 7/16/2020 6:15 PM          XR ANKLE MIN 3 VIEWS LEFT   Final Result   1. Extensive soft tissue gas involving the distal aspect of the left foot, most extensive around the great toe and 2nd toe suggestive of an infectious or gangrenous process.  Amputation or complete destruction of the distal phalanx of the left 2nd toe.     The middle phalanx of the left 2nd toe appears exposed to air with very little soft tissue surrounding it.    2. There are erosive /destructive changes involving the tip of the distal phalanx left 3rd toe suggestive of osteomyelitis.   3.  No significant abnormalities at the left ankle.      Electronically Signed by: JOYA CHARLES JR, M.D.    Signed on: 7/16/2020 6:15 PM                LABS    Results for orders placed or performed during the hospital encounter of 07/16/20   CBC with Automated Differential   Result Value    WBC 21.0 (H)    RBC 4.44    HGB 11.9 (L)    HCT 36.5    MCV 82.2    MCH 26.8    MCHC 32.6    RDW-CV 12.9        NRBC 0    DIFF TYPE AUTOMATED DIFFERENTIAL    Neutrophil 88    LYMPH 6    MONO 5     EOSIN 0    BASO 0    Percent Immature Granuloctyes 1    Absolute Neutrophil 18.5 (H)    Absolute Lymph 1.2    Absolute Mono 1.0 (H)    Absolute Eos 0.0 (L)    Absolute Baso 0.0    Absolute Immature Granulocytes 0.2   Comprehensive Metabolic Panel   Result Value    Sodium 126 (L)    Potassium 4.1    Chloride 93 (L)    Carbon Dioxide 23    Anion Gap 14    Glucose 440 (H)    BUN 27 (H)    Creatinine 0.72    GFR Estimate,  >90     Comment: eGFR results = or >90 mL/min/1.73m2 = Normal kidney function.    GFR Estimate, Non  >90     Comment: eGFR results = or >90 mL/min/1.73m2 = Normal kidney function.    BUN/Creatinine Ratio 38 (H)    CALCIUM 10.0    TOTAL BILIRUBIN 0.4    AST/SGOT 29    ALT/SGPT 62    ALK PHOSPHATASE 198 (H)    TOTAL PROTEIN 8.3 (H)    Albumin 2.5 (L)    GLOBULIN 5.8 (H)    A/G Ratio, Serum 0.4 (L)   Troponin I Ultra Sensitive   Result Value    TROPONIN I 0.17 ()     Comment: [id number] CALLED CRITICAL RESULTS ON 07/16/2020 @ 9674 TO AND READ BACK BY KEISHA WILKERSON RN in ED  Consistent with myocardial injury.  Mild elevations of  troponin may indicate noninfarction cardiac injury or early myocardial infarction.  Serial studies may be helpful.     Prothrombin Time   Result Value    PROTIME 10.9    INR 1.0     Comment: INR Therapeutic Range: 2.0 to 3.0 (2.5 to 3.5 recommended for recurrent thrombotic episodes and mechanical prosthetic heart valves.)    Partial Thromboplastin Time   Result Value    PTT 26     Comment: PTT  Therapeutic Range:  45-70 seconds.   Lactic Acid, Venous   Result Value    Lactic Acid Venous 1.3   Lactic Acid, Venous   Result Value    Lactic Acid Venous 2.4 ()     Comment: [id number] CALLED CRITICAL RESULTS ON 07/16/2020 @ 1337 TO AND READ BACK BY KEISHA WILKERSON in ER   Beta Hydroxybutyrate   Result Value    Beta Hydroxybutyrate 1.1 (H)     Comment: Diabetic Ketoacidosis >2.0 mmol/L   Arterial Blood Gas with Cooximetry   Result Value    PH RC Arterial  7.47 (H)    PCO2 RC Arterial 31 (L)    PO2 RC Arterial 92    HCO3 RC Arterial 23    Base Deficit RC Arterial 0    O2 SAT RC Arterial 100 (H)    Temperature RC 37.0    Site RC RADIAL, LEFT    Carbon Monoxide RC 1.5 (H)    O2 Content RC Arterial 16    OXY HGB RC 97.7    Methemoglobin RC Mixed Venous 0.6    Hemoglobin RC 11.7 (L)   COAG CONSULT LEVEL LIMITED   Result Value    Lactic Acid Arterial RC 1.2   Potassium (performed by respiratory)   Result Value    Potassium RC 4.1   IONIZED CALCIUM-RC   Result Value    CALCIUM IONIZED RC 1.32 (H)   DRVVT CONFIRM   Result Value    Sodium  (L)   CHLORIDE-RC   Result Value    CHLORIDE RC 92 (L)   GLUCOSE-RC   Result Value    Glucose  (HH)     Comment: CALLED TO, READ BACK AND CONFIRMED  DR EH ESPINOSA [id number] DC     HEMATOCRIT-RC   Result Value    Hematocrit RC 35.0 (L)   Troponin I Ultra Sensitive   Result Value    TROPONIN I 0.19 (HH)     Comment: [id number] CALLED CRITICAL RESULTS ON 07/16/2020 @ 2022 TO AND READ BACK BY KEISHA WILKERSON in ER  Consistent with myocardial injury.  Mild elevations of  troponin may indicate noninfarction cardiac injury or early myocardial infarction.  Serial studies may be helpful.     Metered blood glucose   Result Value    Glucose Bedside  (H)   Metered blood glucose   Result Value    Glucose Bedside  (H)      Critical Care Time:   Total critical care time I spent for this patient was 40 minutes.  This time was exclusive of separately billable procedures.  All applicable labs/imaging/vitals were reviewed.  Multiple bedside re-evaluations were performed.  Discussions were had with other physicians as noted.  Failure to intervene on this patient would have resulted in significant deterioration in their condition.    MDM     Vitals:    07/16/20 2240   BP: 133/86   Pulse: 100   Resp: 20   Temp:        61 year old female presents to ED for evaluation of left foot wound.  VS reveal tachycardia and elevated BP.  PE as  documented above.      Tachycardic with obvious foot infection.  Sepsis work up initiated.  Fluid resuscitation ordered.  ABX ordered.      WBC reveals WBC 21.0.  CMP Na 126, chloride 93, glucose 440, BUN 27.  Alk phos 198.    Blood glucose significantly elevated.  Patient without bicarb disturbances or anion gap.  ABG with pH of 7.47.  Bicarb 23.    Troponin 0.17.  Coags without abnormality.  Beta hydroxybutyrate 1.1.  Lactate 1.2.  Blood cultures in process.  Wound culture obtained.    X-ray of the foot reveals extensive soft tissue gas involving distal aspect of left foot most extensive around the great toe, second great toe suggestive of infectious or gangrenous process.  Amputation or complete destruction of distal phalanx of left second toe.  Middle phalanx of left second toe appears exposed air with very little soft tissue surrounding it.  Of erosive and instructive changes involving the tip of the distal phalanx of the left third toe suggestive of osteomyelitis.  No significant abnormality in the left ankle.    Case discussed with Best practices.  Agreeable to admission and consult to podiatry.    Perfect serve sent to podiatry including pictures of patient's extensive wounds.  Is agreeable to consultation.  Advised FRANKIE.  ABIs ordered.  States that she will see patient in the morning.    Patient given insulin for hyperglycemia.  Repeat blood glucose 297.    Repeat lactate per sepsis protocol increased to 2.4 - further LR ordered.      Wound dressed.      FRANKIE's obtained.  ABIs reveal moderately severe vaso-occlusive disease of the left leg and severe vaso-occlusive disease of the right leg.  Diminished volume pulse recordings and arterial flow within toes bilaterally suggestive of small vessel occlusive disease.    Patient's repeat troponin increased to 0.19.  Discussed case with admitting physician who agrees to cardiology consult.  Advised cardiologist of EKG findings and troponins.  Will follow.  Aspirin  ministered.  No further orders.    Patient resting comfortably.  Will transport to floor.    ED Course as of Jul 16 2247   Thu Jul 16, 2020   4924 Discussed case with on-call admitting physician for best practices.  Agrees to admission.  Will consult podiatry.    [CP]      ED Course User Index  [CP] Raina Black PA-C         Impression:  1. Gangrene of toe of left foot (CMS/HCC)    2. Hyperglycemia    3. Elevated troponin    4. Other osteomyelitis of left foot (CMS/HCC)        DISPOSITION: Admit Inpatient Intermediate  Admitting physician: Dr. Smith  Consults: Podiatry - Dr. Mcmahon    Patient seen under the supervision of Dr. Gonzalez - patient seen in conjunction with attending physician       Raina Black PA-C  07/16/20 3510

## 2020-10-06 NOTE — PLAN OF CARE
Problem: Patient Care Overview  Goal: Plan of Care/Patient Progress Review  RN:  1.Pt will remain hemodynamically stable.     Discharge Planner SLP   Patient plan for discharge: Unknown  Current status: Clinical swallow evaluation completed per MD orders. Pt presents with functional oropharyngeal swallow. Pt with adequate mastication/bolus manipulation; minimal oral residue on tongue after solid trials, which pt cleared independently with a sip of liquid. No overt s/sx of aspiration with PO trials. Pt with hoarse voice, which pt and daughter report is baseline. Recommend continue regular diet with thin liquids. Pt to sit upright, take small sips, and pace self with PO intake.   Pt reports no difficulty with comprehension or expression. Pt needing intermittent increased time for responses; however, daughter reports this is baseline for pt.  Barriers to return to prior living situation: None from ST standpoint  Recommendations for discharge: Defer to PT/OT  Rationale for recommendations: No further ST needs identified       Entered by: Gabriela Rivera 05/11/2018 3:39 PM            Griseofulvin Pregnancy And Lactation Text: This medication is Pregnancy Category X and is known to cause serious birth defects. It is unknown if this medication is excreted in breast milk but breast feeding should be avoided.

## 2020-10-16 NOTE — PROGRESS NOTES
1. pna ruled out with CT : chest CT completed; shows duodenitis  2. Pain controlled - using tylenol prn; encouraging fluids/food           Walk in

## 2020-11-03 NOTE — PATIENT INSTRUCTIONS
Medications:  1. No med changes    Follow-up:  1. Please follow up with cardiology in one month      Page the VAD Coordinator on call if you gain more than 3 lb in a day or 5 in a week. Please also page if you feel unwell or have alarms.     Great to see you in clinic today. To Page the VAD Coordinator on call, dial [phone] option #[phone] and ask to speak to the VAD coordinator on call.      Yes...

## 2021-11-22 NOTE — MR AVS SNAPSHOT
Sohan [facility]   2/27/2017   Anticoagulation Therapy Visit    Description:  67 year old male   Provider:  Blanca Bah, RN   Department:  [facility]           INR as of 2/27/2017     Today's INR 2.5      Anticoagulation Summary as of 2/27/2017     INR goal    Prior goal 1.8-2.5   Today's INR 2.5   Full instructions 4.5 mg on Sun, Tue, Thu; 3 mg all other days   Next INR check 3/6/2017    Indications   LVAD (left ventricular assist device) present [Z95.811]  Long-term (current) use of anticoagulants [Z79.01] [Z79.01]         February 2017 Details    Sun Mon Tue Wed Thu Fri Sat        1               2               3               4                 5               6               7               8               9               10               11                 12               13               14               15               16               17               18                 19               20               21               22               23               24               25                 26               27      3 mg   See details      28      4.5 mg              Date Details   02/27 This INR check               How to take your warfarin dose     To take:  3 mg Take 1 of the 3 mg tablets.    To take:  4.5 mg Take 1.5 of the 3 mg tablets.           March 2017 Details    Sun Mon Tue Wed Thu Fri Sat        1      3 mg         2      4.5 mg         3      3 mg         4      3 mg           5      4.5 mg         6            7               8               9               10               11                 12               13               14               15               16               17               18                 19               20               21               22               23               24               25                 26               27               28               29               30               31                 Date Details   No additional details    Date of  next INR:  3/6/2017         How to take your warfarin dose     To take:  3 mg Take 1 of the 3 mg tablets.    To take:  4.5 mg Take 1.5 of the 3 mg tablets.            Statement Selected

## 2021-12-04 NOTE — PLAN OF CARE
"Problem: Goal Outcome Summary  Goal: Goal Outcome Summary  Outcome: Improving  Assumed care at 0730 this morning. Semi-restless this morning with abdominal pain which he described as jabbing in his upper mid abdomen. Tap water enema given this morning, difficult time retaining. No BM today. Xray done, team decided to place NG tube. NG tube placed successfully by nursing at 1530 - tolerated it well. Okay to still give oral meds, just clamp NG after administration. Justice verbalizes abdomen feels better this afternoon and he certainly appears more comfortable. Tylenol and oxycodone given x1 on my shift, has since been resting comfortably. Breathing easily on room air, denies shortness of breath this afternoon but did complain of mild SOB this morning, said \"it feels back to normal now.\" Abdomen remains distended, hypoactive bowel sounds but passing gas this evening. No LVAD alarms, all numbers within parameters. VAD dressing change done this afternoon - no concerns at site.      Cardiology team consulted for hypertension and persistent MAP's >90. Gave extra dose of Cozaar this evening at 1745 - no real improvement with BP's. Then gave PRN IV hydralazine around 1915 tonight. Nursing to continue to monitor. Cardiology also potentially considering diuresis but no diuretics ordered.      Family attentive to needs. Report given back to night shift RN who will assume care over the night shift.       " 21-Oct-2021

## 2022-06-03 NOTE — PROGRESS NOTES
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     Orders Written  Recommended provider order:   WOC Nurse follow-up plan:signing off  Nursing to notify the Provider(s) and re-consult the WOC Nurse if wound(s) deteriorates or new skin concern.    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     Mr Rendon was recently admitted from - with a new MCA stroke c/b aphasia for which he had to be started on tube feeds on discharge he was somewhat more alert and the plan was to reassess to discontinue TF once he was able to eat better. His hospitalization was c/b a UTI with VRE for which he received a 5d course of linezolid per ID recommendations. They discontinued tube feeds yesterday.     He then presented to the hospital on  with chest pain and increased cough production and decreased UOP. Labs were significant for a SCr of 1.7 . hois UA was positive and after questioning family mentioned dysuria. They mindy a urine culture and prescribed him keflex.He had a CT of his abdomen that showed non obstructive gas pattern stable support devices and L>R bibasilar pulmonary opacities.  The UCx from that day is positive for  VRE sensitive to linezolid so they called the patient's family and told him to come to the ED.       On presentation he HR was tachy in the 110's MAP's  In the high 60's -70's. His labs are significant for a WBC of 9.2 (was 10 prior to d/c ) BUN 39 and SCr 2.13 (up from 42/1.77 two days ago sodium 134 albumin 2.6, LDH is 1192 in the setting of a known LVAD thrombus and procalcitonin is negative. CXR shows some pulmonary congestion and small b/l effusions.         Objective Data  Containment of urine/stool: Diaper and Incontinence Protocol    Active Diet Order    Active Diet Order      Dysphagia Diet Level 2 [facility] Altered Thin Liquids (water, ice chips, juice, milk gelatin, ice cream, etc)    Output:   I/O last 3 completed shifts:  In: 480 [P.O.:480]  Out: 1775 [Urine:1775]    Risk Assessment:   Sensory Perception: 3-->slightly limited  Moisture: 3-->occasionally moist  Activity: 1-->bedfast  Mobility: 2-->very limited  Nutrition: 2-->probably inadequate  Friction and Shear: 1-->problem  Declan Score: 12                          Labs:   Recent Labs  Lab 07/10/18  0614 07/10/18  0613  18  1532   ALBUMIN  --   --   --  2.6*   HGB  --  8.0*  < > 7.5*   INR 2.91*  --   < >  --    WBC  --  7.8  < > 9.2   < > = values in this interval not displayed.    Physical Exam  Skin inspection: focused Bilateral groin and Wong cleft     Wound Location:   Cleft   Date of last photo 7/10/2018  Wound History: Patient admitted with MASD/IAD on the wong cleft and groin fold   Wound Base: 100% eroded  epidermis  Palpation of the wound bed: normal   Periwound skin: intact  Color: normal and consistent with surrounding tissue  Temperature: normal   Drainage:,moist vs drainage   Odor: none  Pain: , unable to assessed patient sleeping for the duration of physical exam and intervention     Interventions  Current support surface: Standard  Atmos Air mattress  Current off-loading measures: Pillows under calves  Visual inspection  of wound(s) completed  Wound Care: done per plan of care  Supplies: ordered: at bedside  Education provided: plan of care  Discussed plan of care with patient's daughter     Sridhar Gooden RN     Other than EKG, ED work-up is reassuring.  Spoke with EP, will admit for evaluation/possible cardioversion.  Patient understands and is comfortable with plan.

## 2023-03-21 NOTE — IP AVS SNAPSHOT
MRN:[id number]                      After Visit Summary   11/22/2017    Sohan Rendon    MRN: [id number]           Thank you!     Thank you for choosing [facility] for your care. Our goal is always to provide you with excellent care. Hearing back from our patients is one way we can continue to improve our services. Please take a few minutes to complete the written survey that you may receive in the mail after you visit with us. Thank you!        Patient Information     Date Of Birth          1950        Designated Caregiver       Most Recent Value    Caregiver    Will someone help with your care after discharge? yes    Name of designated caregiver Antionette    Phone number of caregiver [phone]    Caregiver address [address] [place]      About your hospital stay     You were admitted on:  November 22, 2017 You last received care in the:  [address] [facility]    You were discharged on:  November 26, 2017        Reason for your hospital stay       Abdominal pain, concern for pump thrombus                  Who to Call     For medical emergencies, please call 911.  For non-urgent questions about your medical care, please call your primary care provider or clinic, [phone]          Attending Provider     Provider Specialty    Anita Cano MD Emergency Medicine    [facility], Spencer Marte MD Cardiology       Primary Care Provider Office Phone # Fax #    Thomas Dill [phone]      After Care Instructions     Activity       Your activity upon discharge: activity as tolerated            Diet       Follow this diet upon discharge: Orders Placed This Encounter      Combination Diet Low Saturated Fat Na <2400mg Diet, No Caffeine Diet            Discharge Instructions                 Your next 10 appointments already scheduled     Dec 13, 2017 10:00 AM CST   (Arrive by 9:45 AM)   Implanted Defibulator with Uc Cv Device 1   [facility] and  Notify office if you do not improve or have worsening of condition. Take medication as prescribed. May use Cepacol lozenges, or chloraseptic spray. Throat coat tea  Drink plenty of fluids and rest.  Practice good hand hygiene. May sleep with humidifier as needed. Gargle with warm salt water 3-4 times a day to help with sore throat. Warm tea with honey. You can use ice, warm chicken noodle soup, soft foods, popsicles and cough drops to help soothe your throat. May take Tylenol/Ibuprofen (alternate) as needed for fever/pain. Do not eat or drink after anyone. Do not share utensils. Cover your mouth and nose when you cough or sneeze. Follow up with PCP in 3-4 days if not improving or sooner if worsening.   Please refer to educational handout with [facility])    [address] [place] [id number]   [phone]            Dec 13, 2017 10:40 AM CST   (Arrive by 10:25 AM)   Ventricular Assist Device with Evon Lemons MD   [facility] ([facility])    [address] [place] [address]   [phone]            Mar 09, 2018 10:30 AM CST   (Arrive by 10:15 AM)   Implanted Defibulator with Uc Cv Device 1   [facility] ([facility])    [address] [place] [address]   [phone]            Mar 09, 2018 11:00 AM CST   (Arrive by 10:45 AM)   Ventricular Assist Device with CLAIRE Escobar CNP   [facility] ([facility])    [address] [place] [id number]   [phone]              Additional Services     Medication Therapy Management Referral       Reason for referral:  on more than 5 medications and managing chronic disease and on more than 10 medications and hospitalized or in the ED in the past 6 months     This service is designed to help you get the most from your medications.  A specially trained pharmacist will work closely with you and your doctors  to solve any problems related to your medications and to help you get the   best results from taking them.      The Medication Therapy Management staff will call you to schedule an appointment.                  Warfarin Instruction     You have started taking a medicine called warfarin. This is a blood-thinning medicine (anticoagulant). It helps prevent and treat blood clots.      Before leaving the hospital, make sure you know how much to take and how long to take it.      You will need regular blood tests to make sure your blood is clotting safely. It is very important to see your doctor for regular blood tests.    Talk to your doctor before taking any new medicine (this includes  "over-the-counter drugs and herbal products). Many medicines can interact with warfarin. This may cause more bleeding or too much clotting.     Eating a lot of vitamin K--found in green, leafy vegetables--can change the way warfarin works in your body. Do NOT avoid these foods. Instead, try to eat the same amount each day.     Bleeding is the most common side-effect of warfarin. You may notice bleeding gums, a bloody nose, bruises and bleeding longer when you cut yourself. See a doctor at once if:   o You cough up blood  o You find blood in your stool (poop)  o You have a deep cut, or a cut that bleeds longer than 10 minutes   o You have a bad cut, hard fall, accident or hit your head (go to urgent care or the emergency room).    For women who can get pregnant: This medicine can harm an unborn baby. Be very careful not to get pregnant while taking this medicine. If you think you might be pregnant, call your doctor right away.    For more information, read \"Guide to Warfarin Therapy,  the booklet you received in the hospital.        Pending Results     Date and Time Order Name Status Description    11/22/2017 2024 Blood Morphology Pathologist Review In process             Statement of Approval     Ordered          11/26/17 1315  I have reviewed and agree with all the recommendations and orders detailed in this document.  EFFECTIVE NOW     Approved and electronically signed by:  Mason Toledo MD             Admission Information     Date & Time Provider Department Dept. Phone    11/22/2017 Spencer Phipps MD [address] [facility] [phone]      Your Vitals Were     Blood Pressure Pulse Temperature Respirations Weight Pulse Oximetry    98/85 (BP Location: Right arm) 66 97.9  F (36.6  C) (Oral) 16 84.2 kg (185 lb 10 oz) 96%    BMI (Body Mass Index)                   28.22 kg/m2           MyChart Information     [employer] lets you send messages to your doctor, view your test results, renew your prescriptions, " "schedule appointments and more. To sign up, go to [email or web address] . Click on \"Log in\" on the left side of the screen, which will take you to the Welcome page. Then click on \"Sign up Now\" on the right side of the page.     You will be asked to enter the access code listed below, as well as some personal information. Please follow the directions to create your username and password.     Your access code is: [email or web address]  Expires: 2017  3:12 PM     Your access code will  in 90 days. If you need help or a new code, please call your [place] clinic or [phone].        Care EveryWhere ID     This is your Care EveryWhere ID. This could be used by other organizations to access your [facility] [place] medical records  [id number]        Equal Access to Services     ALCON SKINNER : Rocky Rahman, jeana morrison, otto rose, willie dorman . So [facility] [phone].    ATENCIÓN: Si habla español, tiene a smith disposición servicios gratuitos de asistencia lingüística. Dayron al [phone].    We comply with applicable federal civil rights laws and [place] laws. We do not discriminate on the basis of race, color, national origin, age, disability, sex, sexual orientation, or gender identity.               Review of your medicines      START taking        Dose / Directions    ranitidine 75 MG tablet   Commonly known as:  ZANTAC   Used for:  Gastroesophageal reflux disease without esophagitis   Notes to Patient:  Resume home schedule        Dose:  75 mg   Take 1 tablet (75 mg) by mouth At Bedtime   Quantity:  30 tablet   Refills:  3         CONTINUE these medicines which may have CHANGED, or have new prescriptions. If we are uncertain of the size of tablets/capsules you have at home, strength may be listed as something that might have changed.        Dose / Directions    simethicone 80 MG chewable tablet   Commonly known as:  MYLICON   This may have changed:    - " when to take this  - reasons to take this   Used for:  Slow transit constipation        Dose:  80 mg   Take 1 tablet (80 mg) by mouth 4 times daily   Quantity:  180 tablet   Refills:  5         CONTINUE these medicines which have NOT CHANGED        Dose / Directions    acetaminophen 325 MG tablet   Commonly known as:  TYLENOL   Used for:  Femoral neck fracture, right, closed, initial encounter        Dose:  650 mg   Take 2 tablets (650 mg) by mouth every 6 hours   Quantity:  100 tablet   Refills:  0       ALEK CONTOUR test strip   Used for:  Hypoglycemia   Generic drug:  blood glucose monitoring        USE TO TEST BLOOD SUGAR 2 TIMES DAILY OR AS DIRECTED.   Quantity:  100 strip   Refills:  2       bisacodyl 10 MG Suppository   Commonly known as:  DULCOLAX   Used for:  Drug-induced constipation        Dose:  10 mg   Place 1 suppository (10 mg) rectally daily as needed for constipation   Quantity:  5 suppository   Refills:  1       blood glucose monitoring lancets   Used for:  Diabetes mellitus, type 2 (H)        Use to test blood sugars 2 times daily or as directed.   Quantity:  1 Box   Refills:  3       calcium carbonate-vitamin D 500-400 MG-UNIT Tabs per tablet   Used for:  Cardiac arrhythmia, unspecified cardiac arrhythmia type   Notes to Patient:  Resume home schedule        Dose:  1 tablet   Take 1 tablet by mouth daily   Quantity:  90 tablet   Refills:  3       finasteride 5 MG tablet   Commonly known as:  PROSCAR   Used for:  Incomplete bladder emptying        Dose:  5 mg   Take 1 tablet (5 mg) by mouth daily   Quantity:  90 tablet   Refills:  3       furosemide 20 MG tablet   Commonly known as:  LASIX   Used for:  Chronic systolic congestive heart failure (H)        Dose:  20 mg   Take 1 tablet (20 mg) by mouth as needed   Quantity:  20 tablet   Refills:  11       gabapentin 100 MG capsule   Commonly known as:  NEURONTIN   Used for:  Left foot pain        Dose:  100 mg   Take 1 capsule (100 mg) by mouth 2  times daily   Quantity:  60 capsule   Refills:  3       ketotifen 0.025 % Soln ophthalmic solution   Commonly known as:  ZADITOR/REFRESH ANTI-ITCH   Used for:  Allergic conjunctivitis, bilateral        Dose:  1 drop   Place 1 drop into both eyes 2 times daily   Quantity:  1 Bottle   Refills:  11       levETIRAcetam 750 MG tablet   Commonly known as:  KEPPRA   Used for:  Convulsions, unspecified convulsion type (H)        TAKE 1 TABLET (750 MG) BY MOUTH 2 TIMES DAILY   Quantity:  180 tablet   Refills:  3       loratadine 10 MG tablet   Commonly known as:  CLARITIN   Used for:  Allergic rhinitis   Notes to Patient:  Resume home schedule        TAKE 1 TABLET (10 MG) BY MOUTH DAILY   Quantity:  90 tablet   Refills:  2       melatonin 3 MG tablet   Used for:  Insomnia, unspecified type        Dose:  3 mg   Take 1 tablet (3 mg) by mouth At Bedtime   Refills:  0       nitroGLYcerin 0.4 MG sublingual tablet   Commonly known as:  NITROSTAT   Used for:  Coronary artery disease involving native coronary artery with unstable angina pectoris (H)        Dose:  0.4 mg   Place 1 tablet (0.4 mg) under the tongue every 5 minutes as needed for chest pain   Quantity:  30 tablet   Refills:  1       nystatin 055125 UNIT/GM Powd   Commonly known as:  MYCOSTATIN   Used for:  Intertriginous dermatitis associated with moisture   Notes to Patient:  Resume home schedule        Apply to affected areas of skin in the groin and on the scrotum three times a day as needed.   Quantity:  60 g   Refills:  3       * order for DME   Used for:  Fracture of femoral neck, right, closed, initial encounter, Stroke (H), CHF (congestive heart failure), NYHA class IV (H)        Equipment being ordered: Lift chair.  Please see indications and face-to-face encounter details in 2/3/15 Office Visit note.   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   Quantity:  1 each   Refills:  0       * order for DME   Used for:  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), Displaced fracture of right femoral neck, closed, with nonunion, subsequent encounter        Equipment being ordered: Wheelchair, manual, with elevated leg rests and tilt in space back.  Please fax to [employer]; I called them 11/26/16 and they requested the new order.  Face to face is in my 10/26/16 note.   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   Quantity:  2 each   Refills:  0       * order for DME   Used for:  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)        Bilateral heel protective cushioning boots.  Dx: previous stroke with left hemiplegia.  Duration of need: 99 months.    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, subsequent encounter        Dose:  5 mg   Take 1 tablet (5 mg) by mouth every 4 hours as needed for moderate to severe pain   Quantity:  30 tablet   Refills:  0       SENEXON-S 8.6-50 MG per tablet   Used for:  Slow transit constipation   Generic drug:  senna-docusate   Notes to Patient:  Resume home schedule        TAKE 1-2 TABLETS BY MOUTH 2 TIMES DAILY AS NEEDED FOR CONSTIPATION   Quantity:  60 tablet   Refills:  3       tamsulosin 0.4 MG capsule   Commonly known as:  FLOMAX   Used for:  Incomplete bladder emptying        TAKE 1 CAPSULE EVERY DAY   Quantity:  90 capsule   Refills:  3       thiamine 100 MG tablet   Used for:  Cerebrovascular accident (CVA) due to embolism of right middle cerebral artery (H)   Notes to Patient:  Resume home schedule        TAKE 1 TABLET (100 MG) BY MOUTH DAILY   Quantity:  90 tablet   Refills:  3       TOPROL XL PO        Take 50mg by mouth every morning. Take 25mg by mouth every evening.   Refills:  0       warfarin 3 MG tablet   Commonly known as:  COUMADIN        Dose:  4.5 mg   Take 4.5 mg by mouth daily   Refills:  0       * Notice:  This list has 8 medication(s) that are the same as other medications prescribed for you. Read the directions carefully, and ask your doctor or other care provider to review them with you.      STOP taking     allopurinol 100 MG tablet   Commonly known as:  ZYLOPRIM           atorvastatin 10 MG tablet   Commonly known as:  LIPITOR           losartan 25 MG tablet   Commonly known as:  COZAAR           magnesium oxide 400 MG tablet   Commonly known as:  MAG-OX           pantoprazole 20 MG EC tablet   Commonly known as:  PROTONIX                Where to get your medicines      These medications were sent to [facility] - [place], [place] - [address]  [address]  [facility] [address]     Phone:  [phone]     ranitidine 75 MG tablet                Protect others around you: Learn how to safely use, store and throw away your medicines at www.disposemymeds.org.             Medication List: This is a list of all your medications and when to take them. Check marks below indicate your daily home schedule. Keep this list as a reference.      Medications           Morning Afternoon Evening Bedtime As Needed    acetaminophen 325 MG tablet   Commonly known as:  TYLENOL   Take 2 tablets (650 mg) by mouth every 6 hours   Last time this was given:  650 mg on 11/26/2017  3:10 PM   Next Dose Due:  9PM 11/26                                            ALEK CONTOUR test strip   USE TO TEST BLOOD SUGAR 2 TIMES DAILY OR AS DIRECTED.   Generic drug:  blood glucose monitoring                                   bisacodyl 10 MG Suppository   Commonly known as:  DULCOLAX   Place 1 suppository (10 mg) rectally daily as needed for constipation   Last time this was given:  10 mg on 11/24/2017  3:39 PM                                   blood glucose monitoring lancets   Use to test blood sugars 2 times daily or as directed.                                   calcium carbonate-vitamin D 500-400 MG-UNIT Tabs per tablet   Take 1 tablet by mouth daily   Notes to Patient:  Resume home schedule                                finasteride 5 MG tablet   Commonly known as:  PROSCAR   Take 1 tablet (5 mg) by mouth daily   Last time this was given:  5 mg on 11/26/2017  8:52 AM   Next Dose Due:  AM 11/27                                   furosemide 20 MG tablet   Commonly known as:  LASIX   Take 1 tablet (20 mg) by mouth as needed                                   gabapentin 100 MG capsule   Commonly known as:  NEURONTIN   Take 1 capsule (100 mg) by mouth 2 times daily   Last time this was given:  100 mg on 11/24/2017  8:03 AM                                ketotifen 0.025 % Soln ophthalmic solution    Commonly known as:  ZADITOR/REFRESH ANTI-ITCH   Place 1 drop into both eyes 2 times daily   Last time this was given:  1 drop on 11/26/2017  8:52 AM   Next Dose Due:  PM 11/26                                      levETIRAcetam 750 MG tablet   Commonly known as:  KEPPRA   TAKE 1 TABLET (750 MG) BY MOUTH 2 TIMES DAILY   Last time this was given:  750 mg on 11/26/2017  8:52 AM   Next Dose Due:  PM 11/26                                      loratadine 10 MG tablet   Commonly known as:  CLARITIN   TAKE 1 TABLET (10 MG) BY MOUTH DAILY   Notes to Patient:  Resume home schedule                                   melatonin 3 MG tablet   Take 1 tablet (3 mg) by mouth At Bedtime   Last time this was given:  3 mg on 11/25/2017 10:20 PM   Next Dose Due:  Bedtime 11/26                                   nitroGLYcerin 0.4 MG sublingual tablet   Commonly known as:  NITROSTAT   Place 1 tablet (0.4 mg) under the tongue every 5 minutes as needed for chest pain                                   nystatin 445774 UNIT/GM Powd   Commonly known as:  MYCOSTATIN   Apply to affected areas of skin in the groin and on the scrotum three times a day as needed.   Notes to Patient:  Resume home schedule                                   * order for DME   Equipment being ordered: Lift chair.  Please see indications and face-to-face encounter details in 2/3/15 Office Visit note.                                   * order for DME   Equipment being ordered: Bilateral leg supports for manual wheelchair.                                   * order for DME   Equipment being ordered: Reclining manual w/c with bilateral elevating leg rests.                                   * order for DME   Equipment being ordered: Hospital Bed, fully electric.                                   * order for DME   Equipment being ordered: Wheelchair, manual.                                   * order for DME   Equipment being ordered: Wheelchair, manual, with elevated leg rests  and tilt in space back.  Please fax to [facility]; I called them 11/26/16 and they requested the new order.  Face to face is in my 10/26/16 note.                                   * order for DME   Equipment being ordered: Cushioned heel boots.                                   * order for DME   Bilateral heel protective cushioning boots.  Dx: previous stroke with left hemiplegia.  Duration of need: 99 months.                                   oxyCODONE IR 5 MG tablet   Commonly known as:  ROXICODONE   Take 1 tablet (5 mg) by mouth every 4 hours as needed for moderate to severe pain                                   ranitidine 75 MG tablet   Commonly known as:  ZANTAC   Take 1 tablet (75 mg) by mouth At Bedtime   Notes to Patient:  Resume home schedule                                   SENEXON-S 8.6-50 MG per tablet   TAKE 1-2 TABLETS BY MOUTH 2 TIMES DAILY AS NEEDED FOR CONSTIPATION   Last time this was given:  1 tablet on 11/24/2017  8:10 PM   Generic drug:  senna-docusate   Notes to Patient:  Resume home schedule                                   simethicone 80 MG chewable tablet   Commonly known as:  MYLICON   Take 1 tablet (80 mg) by mouth 4 times daily                                   tamsulosin 0.4 MG capsule   Commonly known as:  FLOMAX   TAKE 1 CAPSULE EVERY DAY   Last time this was given:  0.4 mg on 11/26/2017  8:52 AM   Next Dose Due:  AM 11/27                                   thiamine 100 MG tablet   TAKE 1 TABLET (100 MG) BY MOUTH DAILY   Notes to Patient:  Resume home schedule                                TOPROL XL PO   Take 50mg by mouth every morning. Take 25mg by mouth every evening.   Last time this was given:  50 mg on 11/26/2017  8:52 AM   Next Dose Due:  PM 11/26                                      warfarin 3 MG tablet   Commonly known as:  COUMADIN   Take 4.5 mg by mouth daily   Last time this was given:  4.5 mg on 11/25/2017  6:13 PM   Next Dose Due:  PM 11/26                                    * Notice:  This list has 8 medication(s) that are the same as other medications prescribed for you. Read the directions carefully, and ask your doctor or other care provider to review them with you.

## 2023-06-13 NOTE — PROGRESS NOTES
Antionette called to find out what the coumadin dose should be tonight and tomorrow night. The coumadin clinic had tried to leave a message but the voicemail was full. Antionette said everything is going well.   Anesthesia Type: 1% lidocaine without epinephrine

## 2023-08-02 NOTE — PROGRESS NOTES
ANTICOAGULATION FOLLOW-UP CLINIC VISIT    Patient Name:  Sohan Rendon  Date:  6/19/2017  Contact Type:  Telephone    SUBJECTIVE:     Patient Findings     Positives No Problem Findings           OBJECTIVE    INR Protime   Date Value Ref Range Status   06/19/2017 2.1 (A) 0.86 - 1.14 Final     Chromogenic Factor 10   Date Value Ref Range Status   08/10/2014 24 (L) 70 - 130 % Final     Comment:     Therapeutic Range:  A Chromogenic Factor 10 level of approximately 20-40%   inversely correlates with an INR of 2-3 for patients receiving Warfarin.   Chromogenic Factor 10 levels below 20% indicate an INR greater than 3 and   levels above 40% indicate an INR less than 2.       Factor 2 Assay   Date Value Ref Range Status   07/21/2014 25 (L) 60 - 140 % Final     Comment:     Analyte Specific Reagents (ASRs) are used in many laboratory tests necessary   for   standard medical care and generally do not require FDA approval.  This test   was   developed and its performance characteristics determined by [facility].  It has not been cleared or approved by   the [place] Food and Drug Administration.         ASSESSMENT / PLAN  INR assessment THER    Recheck INR In: 3 DAYS    INR Location [facility]      Anticoagulation Summary as of 6/19/2017     INR goal    Prior goal 1.8-2.5   Today's INR 2.1   Maintenance plan No maintenance plan   Full instructions 6/19: 6 mg; 6/20: 6 mg; 6/21: 4.5 mg   Plan last modified Blanca Bah RN (5/30/2017)   Next INR check 6/22/2017   Priority INR   Target end date Indefinite    Indications   LVAD (left ventricular assist device) present [Z95.811]  Long-term (current) use of anticoagulants [Z79.01] [Z79.01]         Anticoagulation Episode Summary     INR check location     Preferred lab     Send INR reminders to [facility]    Comments Goal Range is 1.8-2.3  [facility] comes out to draw INR  Daughter Almaz Ph [phone]       Anticoagulation Care Providers     Provider Role Specialty Phone number    Evon Lemons MD Responsible Cardiology [phone]            See the Encounter Report to view Anticoagulation Flowsheet and Dosing Calendar (Go to Encounters tab in chart review, and find the Anticoagulation Therapy Visit)    Spoke with home care nurse Sofya.  She reports pt is not on ASA    Amy Godinez RN                [place]

## 2024-06-17 PROBLEM — Z76.89 HEALTH CARE HOME: Status: RESOLVED | Noted: 2017-01-13 | Resolved: 2024-06-17

## 2024-06-17 PROBLEM — Z71.89 ADVANCED DIRECTIVES, COUNSELING/DISCUSSION: Status: RESOLVED | Noted: 2018-01-01 | Resolved: 2024-06-17

## 2024-09-16 NOTE — PROGRESS NOTES
Diabetes Consult Daily  Progress Note          Assessment/Plan:   Sohan Rendon is a 68 year old man with type 2 diabetes and ischemic cardiomyopathay s/p LVAD in 2012, destination therapy, with complicated post-VAD course (hemolysis, pump thrombosis, strokes), presenting with AMS and aphasia and found to have new large MCA stroke, experiencing major hyperglycemia related to enteral feeds and acute illness.      Unfortunately, IV insulin infusion was stopped last evening.    Insulin needs for new TF regimen remain uncertain.        Plan  -aspart custom correction 1 per 35 mg/dL glucose for noon and 1600 today  -restart IV insulin infusion tonight at 1800, when TF cycle begins  -start aspart for carbohydrate at 1 units per 12 grams carb  -plan for transition to NPH tomorrow  -pt's family will need teaching on insulin if to discharge to home on cycled enteral feeds         Outpatient diabetes follow up: PCP  Plan discussed with patient's family, bedside RN, and primary team.           Interval History:     The last 24 hours progress and nursing notes reviewed.  BG dropped to 60s after TF cycled off.  Insulin drip was stopped per hypoglycemia protocol, but was not restarted once glucose reached greater than 100.  Formerly, requiring 6-10 units IV insulin per hour when getting 11 grams carb/h from [employer] at 50 cc/h.  Nepro 85 cc/h (which appears to have run from 2200 until this morning), provides nearly 14 grams carb/h.  Extrapolation would call for NPH dose nearly 140 units. However, hyperglycemia this  Morning is not at profound as would be expected considering BGs reached 400s before insulin drip was originally started.  Pt's insulin resistance may be dropping.    Family did try to feed pt last night, but around time BG was in 60s.  They describe pt was a bit combative and sleepy and wasn't able to swallow food effectively.  They hope w/ some rest today, he'll do better in afternoon.  Note  D5W started last night for hypernatremia and to run until this afternoon.    PTA Regimen: only checking BG qAM, no medications        Recent Labs  Lab 06/14/18  1123 06/14/18  0858 06/14/18  0615 06/14/18  0518 06/14/18  0112 06/13/18  2150 06/13/18 2007 06/13/18  1453  06/10/18  0521 06/09/18  0527  06/08/18  0352   GLC  --   --  272*  --   --   --   --   --  165*  --  410* 234*  --  147*   * 255*  --  255* 237* 160* 144*  < >  --   < >  --   --   < >  --    < > = values in this interval not displayed.            Review of Systems:   See interval hx          Medications:       Active Diet Order      Dysphagia Diet Level 1 Pureed Nectar Thickened Liquids (pre-thickened or use instant food thickener)     Physical Exam:  Gen: Alert, resting in bed, in NAD   HEENT: NC/AT, mucous membranes are moist, NJ FT bridled  Resp: Unlabored, but sounds congested  Neuro: asleep, not awakened  /81 (BP Location: Left arm)  Temp 98.3  F (36.8  C) (Axillary)  Resp 24  Ht 1.829 m (6')  Wt 86.8 kg (191 lb 4.8 oz)  SpO2 98%  BMI 25.94 kg/m2           Data:     Lab Results   Component Value Date    A1C 4.7 06/07/2018    A1C 4.8 08/04/2017    A1C 5.2 07/21/2016    A1C 8.0 09/03/2014              CBC RESULTS:   Recent Labs   Lab Test  06/14/18   0615   WBC  14.8*   RBC  2.81*   HGB  8.0*   HCT  29.7*   MCV  106*   MCH  28.5   MCHC  26.9*   RDW  25.9*   PLT  211     Recent Labs   Lab Test  06/14/18   0615  06/14/18   0011  06/13/18 2054 06/13/18   1453   NA  152*  154*   --   157*   POTASSIUM  4.8   --    --   4.8   CHLORIDE  124*   --   >125*  128*   CO2  19*   --    --   20   ANIONGAP  10   --    --   10   GLC  272*   --    --   165*   BUN  51*   --    --   56*   CR  2.30*   --    --   2.20*   JOSÉ  8.7   --    --   8.5     Liver Function Studies -   Recent Labs   Lab Test  06/06/18   2115   PROTTOTAL  7.5   ALBUMIN  3.2*   BILITOTAL  0.7   ALKPHOS  117   AST  50*   ALT  19     Lab Results   Component Value Date     INR 1.36 06/14/2018    INR 1.40 06/13/2018    INR 1.54 06/12/2018    INR 1.71 06/11/2018   I spent a total of 35 minutes bedside and on the inpatient unit managing the glycemic care of Sohan Rendon. Over 50% of my time on the unit was spent counseling the patient's family and/or coordinating care regarding glucose management revision.  See note for details.        Doris Rajputpton APRN -[id number]    Diabetes Management job code 0248           [FreeTextEntry1] : X-ray Report: Weight bearing x-rays, 3 views of the left ankle, 3 views of the right ankle, 2 views of the left foot, 2 views of the right foot, were obtained for evaluation. There is ankle joint bilaterally that is congruent. There is negative signs of OCD. Negative signs of acute fracture or dislocation. On lateral view there is an increase in talar declination and decrease in the calcaneal inclination angles bilaterally with break in the cyma line bilateral. There is mild degenerative changes at the mid tarsal and talonavicular joint. Mild talar beaking, right greater than left. The posterior facet and middle facet appear without coalition. No signs of coalition noted on radiographs. On DP view there is uncovering of the talar head. Negative subluxation, fracture or dislocations noted. Negative osseous coalitions noted in the midfoot.

## (undated) DEVICE — SOL WATER IRRIG 1000ML BOTTLE 2F7114

## (undated) DEVICE — KIT ENDO FIRST STEP DISINFECTANT 200ML W/POUCH EP-4

## (undated) DEVICE — PACK ENDOSCOPY GI CUSTOM UMMC

## (undated) DEVICE — BIOPSY VALVE BIOSHIELD 00711135

## (undated) DEVICE — WIRE GUIDE 0.025"X450CM STR VISIGLIDE G-240-2545S

## (undated) DEVICE — TAPE DURAPORE 3" SILK 1538-3

## (undated) DEVICE — CANNULA BILIARY FLUROTIP ERCP .035"X210CM TAPER TIP

## (undated) DEVICE — ENDO CATH BALLOON DILATION HURRICANE 04MMX4X180CM M00545900

## (undated) DEVICE — WIRE GUIDE 0.025"X270CM STR VISIGLIDE G-240-2527S

## (undated) DEVICE — CATH RETRIEVAL BALLOON EXTRACTOR PRO RX-S INJ ABOVE 9-12MM

## (undated) DEVICE — GUIDEWIRE NOVAGOLD .018X260CM STR TIP M00552000

## (undated) DEVICE — ENDO FUSION OMNI-TOME 21 FS-OMNI-21 G48675

## (undated) DEVICE — ENDO TUBING CO2 SMARTCAP STERILE DISP 100145CO2EXT

## (undated) DEVICE — INFLATION DEVICE BIG 60 ENDO-AN6012

## (undated) DEVICE — LABEL MEDICATION SYSTEM 3303-P

## (undated) DEVICE — ENDO BITE BLOCK PED

## (undated) RX ORDER — LIDOCAINE HYDROCHLORIDE 10 MG/ML
INJECTION, SOLUTION EPIDURAL; INFILTRATION; INTRACAUDAL; PERINEURAL
Status: DISPENSED
Start: 2017-06-30

## (undated) RX ORDER — LIDOCAINE HYDROCHLORIDE 10 MG/ML
INJECTION, SOLUTION EPIDURAL; INFILTRATION; INTRACAUDAL; PERINEURAL
Status: DISPENSED
Start: 2018-01-01

## (undated) RX ORDER — HEPARIN SODIUM,PORCINE 10 UNIT/ML
VIAL (ML) INTRAVENOUS
Status: DISPENSED
Start: 2018-01-01